# Patient Record
Sex: MALE | Race: WHITE | NOT HISPANIC OR LATINO | Employment: OTHER | URBAN - METROPOLITAN AREA
[De-identification: names, ages, dates, MRNs, and addresses within clinical notes are randomized per-mention and may not be internally consistent; named-entity substitution may affect disease eponyms.]

---

## 2021-04-19 ENCOUNTER — OFFICE VISIT (OUTPATIENT)
Dept: INTERNAL MEDICINE | Facility: CLINIC | Age: 71
End: 2021-04-19

## 2021-04-19 ENCOUNTER — LAB (OUTPATIENT)
Dept: LAB | Facility: HOSPITAL | Age: 71
End: 2021-04-19

## 2021-04-19 VITALS
HEIGHT: 72 IN | SYSTOLIC BLOOD PRESSURE: 126 MMHG | DIASTOLIC BLOOD PRESSURE: 76 MMHG | BODY MASS INDEX: 24.84 KG/M2 | HEART RATE: 56 BPM | OXYGEN SATURATION: 97 % | WEIGHT: 183.4 LBS

## 2021-04-19 DIAGNOSIS — M54.2 NECK PAIN: ICD-10-CM

## 2021-04-19 DIAGNOSIS — R00.1 BRADYCARDIA: ICD-10-CM

## 2021-04-19 DIAGNOSIS — Z20.828 MONO EXPOSURE: ICD-10-CM

## 2021-04-19 DIAGNOSIS — R91.1 LUNG NODULE: ICD-10-CM

## 2021-04-19 DIAGNOSIS — J20.9 ACUTE BRONCHITIS, UNSPECIFIED ORGANISM: ICD-10-CM

## 2021-04-19 DIAGNOSIS — J20.9 ACUTE BRONCHITIS, UNSPECIFIED ORGANISM: Primary | ICD-10-CM

## 2021-04-19 DIAGNOSIS — J43.9 PULMONARY EMPHYSEMA, UNSPECIFIED EMPHYSEMA TYPE (HCC): ICD-10-CM

## 2021-04-19 LAB
ALBUMIN SERPL-MCNC: 4.1 G/DL (ref 3.5–5.2)
ALBUMIN/GLOB SERPL: 1.2 G/DL
ALP SERPL-CCNC: 88 U/L (ref 39–117)
ALT SERPL W P-5'-P-CCNC: 11 U/L (ref 1–41)
ANION GAP SERPL CALCULATED.3IONS-SCNC: 8.8 MMOL/L (ref 5–15)
AST SERPL-CCNC: 15 U/L (ref 1–40)
BASOPHILS # BLD AUTO: 0.04 10*3/MM3 (ref 0–0.2)
BASOPHILS NFR BLD AUTO: 0.6 % (ref 0–1.5)
BILIRUB SERPL-MCNC: 0.2 MG/DL (ref 0–1.2)
BUN SERPL-MCNC: 10 MG/DL (ref 8–23)
BUN/CREAT SERPL: 9.8 (ref 7–25)
CALCIUM SPEC-SCNC: 9.4 MG/DL (ref 8.6–10.5)
CHLORIDE SERPL-SCNC: 101 MMOL/L (ref 98–107)
CO2 SERPL-SCNC: 28.2 MMOL/L (ref 22–29)
CREAT SERPL-MCNC: 1.02 MG/DL (ref 0.76–1.27)
DEPRECATED RDW RBC AUTO: 43.9 FL (ref 37–54)
EOSINOPHIL # BLD AUTO: 0.27 10*3/MM3 (ref 0–0.4)
EOSINOPHIL NFR BLD AUTO: 4.1 % (ref 0.3–6.2)
ERYTHROCYTE [DISTWIDTH] IN BLOOD BY AUTOMATED COUNT: 12.5 % (ref 12.3–15.4)
GFR SERPL CREATININE-BSD FRML MDRD: 72 ML/MIN/1.73
GLOBULIN UR ELPH-MCNC: 3.5 GM/DL
GLUCOSE SERPL-MCNC: 79 MG/DL (ref 65–99)
HCT VFR BLD AUTO: 43.3 % (ref 37.5–51)
HETEROPH AB SER QL LA: NEGATIVE
HGB BLD-MCNC: 14.6 G/DL (ref 13–17.7)
IMM GRANULOCYTES # BLD AUTO: 0.02 10*3/MM3 (ref 0–0.05)
IMM GRANULOCYTES NFR BLD AUTO: 0.3 % (ref 0–0.5)
LYMPHOCYTES # BLD AUTO: 1.71 10*3/MM3 (ref 0.7–3.1)
LYMPHOCYTES NFR BLD AUTO: 26.2 % (ref 19.6–45.3)
MCH RBC QN AUTO: 32.3 PG (ref 26.6–33)
MCHC RBC AUTO-ENTMCNC: 33.7 G/DL (ref 31.5–35.7)
MCV RBC AUTO: 95.8 FL (ref 79–97)
MONOCYTES # BLD AUTO: 0.86 10*3/MM3 (ref 0.1–0.9)
MONOCYTES NFR BLD AUTO: 13.2 % (ref 5–12)
NEUTROPHILS NFR BLD AUTO: 3.62 10*3/MM3 (ref 1.7–7)
NEUTROPHILS NFR BLD AUTO: 55.6 % (ref 42.7–76)
NRBC BLD AUTO-RTO: 0 /100 WBC (ref 0–0.2)
PLATELET # BLD AUTO: 319 10*3/MM3 (ref 140–450)
PMV BLD AUTO: 9.7 FL (ref 6–12)
POTASSIUM SERPL-SCNC: 4.6 MMOL/L (ref 3.5–5.2)
PROT SERPL-MCNC: 7.6 G/DL (ref 6–8.5)
RBC # BLD AUTO: 4.52 10*6/MM3 (ref 4.14–5.8)
SODIUM SERPL-SCNC: 138 MMOL/L (ref 136–145)
WBC # BLD AUTO: 6.52 10*3/MM3 (ref 3.4–10.8)

## 2021-04-19 PROCEDURE — 85025 COMPLETE CBC W/AUTO DIFF WBC: CPT

## 2021-04-19 PROCEDURE — 86664 EPSTEIN-BARR NUCLEAR ANTIGEN: CPT

## 2021-04-19 PROCEDURE — 80053 COMPREHEN METABOLIC PANEL: CPT

## 2021-04-19 PROCEDURE — 99204 OFFICE O/P NEW MOD 45 MIN: CPT | Performed by: PHYSICIAN ASSISTANT

## 2021-04-19 PROCEDURE — 86308 HETEROPHILE ANTIBODY SCREEN: CPT

## 2021-04-19 PROCEDURE — 36415 COLL VENOUS BLD VENIPUNCTURE: CPT

## 2021-04-19 PROCEDURE — 86665 EPSTEIN-BARR CAPSID VCA: CPT

## 2021-04-19 RX ORDER — ALBUTEROL SULFATE 90 UG/1
2 AEROSOL, METERED RESPIRATORY (INHALATION) EVERY 4 HOURS PRN
Qty: 18 G | Refills: 2 | Status: SHIPPED | OUTPATIENT
Start: 2021-04-19

## 2021-04-19 RX ORDER — LISINOPRIL 2.5 MG/1
2.5 TABLET ORAL AS NEEDED
COMMUNITY
End: 2021-11-18 | Stop reason: SDUPTHER

## 2021-04-19 RX ORDER — DOXYCYCLINE 100 MG/1
100 CAPSULE ORAL 2 TIMES DAILY
Qty: 20 CAPSULE | Refills: 0 | Status: SHIPPED | OUTPATIENT
Start: 2021-04-19 | End: 2021-06-30

## 2021-04-19 RX ORDER — ASPIRIN 81 MG/1
81 TABLET ORAL DAILY
COMMUNITY
End: 2022-10-21

## 2021-04-19 RX ORDER — METHYLPREDNISOLONE 4 MG/1
TABLET ORAL
Qty: 21 TABLET | Refills: 0 | Status: SHIPPED | OUTPATIENT
Start: 2021-04-19 | End: 2021-06-30

## 2021-04-19 RX ORDER — PRAVASTATIN SODIUM 80 MG/1
80 TABLET ORAL NIGHTLY
COMMUNITY
End: 2021-11-18 | Stop reason: SDUPTHER

## 2021-04-19 NOTE — PROGRESS NOTES
Chief Complaint   Patient presents with   • Establish Care       Subjective     David Barfield is a 71 y.o. male.        History of Present Illness     Pt is here with a friend, Balaji Trinidad, who provides much of patient's history.    [facility], usually sees her about once a year.    Pt has hypertension and takes lisinopril occasionally.     He has emphysema and has had PET scan before that showed a positive spot. He has not agreed to having the spot biopsied, lymph node was benign. Biopsy and PET scan were done in [place].     Pt was with grandson a couple weeks ago and found out later that his grandson has Mono. Pt has been feeling fatigued and has a cough. Had subjective fever last night. Took some Tylenol last night. Feels like he has these same symptoms every year. Has tried allergy treatment.     Has been told he needs a pacemaker but he has not followed up with Cardiologist.           Current Outpatient Medications:   •  aspirin 81 MG EC tablet, Take 81 mg by mouth Daily., Disp: , Rfl:   •  lisinopril (PRINIVIL,ZESTRIL) 2.5 MG tablet, Take 2.5 mg by mouth Daily. Take 1/2 tablet po prn, Disp: , Rfl:   •  pravastatin (PRAVACHOL) 80 MG tablet, Take 80 mg by mouth Every Night., Disp: , Rfl:   •  albuterol sulfate  (90 Base) MCG/ACT inhaler, Inhale 2 puffs Every 4 (Four) Hours As Needed for Wheezing or Shortness of Air., Disp: 18 g, Rfl: 2  •  doxycycline (MONODOX) 100 MG capsule, Take 1 capsule by mouth 2 (Two) Times a Day., Disp: 20 capsule, Rfl: 0  •  methylPREDNISolone (Medrol) 4 MG dose pack, follow package directions, Disp: 21 tablet, Rfl: 0     PMFSH  The following portions of the patient's history were reviewed and updated as appropriate: allergies, current medications, past family history, past medical history, past social history, past surgical history and problem list.    Review of Systems   Constitutional: Positive for fatigue. Negative for activity change and  "unexpected weight change.   HENT: Positive for congestion, postnasal drip and sore throat. Negative for ear pain.    Eyes: Negative for pain and discharge.   Respiratory: Positive for cough. Negative for chest tightness, shortness of breath and wheezing.    Cardiovascular: Negative for chest pain and palpitations.   Gastrointestinal: Negative for abdominal pain, diarrhea and vomiting.   Endocrine: Negative.    Genitourinary: Negative.    Musculoskeletal: Negative for joint swelling.   Skin: Negative for color change, rash and wound.   Allergic/Immunologic: Negative.    Neurological: Negative for seizures and syncope.   Psychiatric/Behavioral: Negative.        Objective   /76   Pulse 56   Ht 182.9 cm (72\")   Wt 83.2 kg (183 lb 6.4 oz)   SpO2 97%   BMI 24.87 kg/m²     Physical Exam  Vitals and nursing note reviewed.   Constitutional:       General: He is not in acute distress.     Appearance: He is well-developed. He is not toxic-appearing or diaphoretic.   HENT:      Head: Normocephalic and atraumatic. Hair is normal.      Right Ear: External ear normal. No drainage, swelling or tenderness. Tympanic membrane is retracted.      Left Ear: External ear normal. No drainage, swelling or tenderness. Tympanic membrane is retracted.      Nose: Mucosal edema present.      Mouth/Throat:      Mouth: No oral lesions.      Pharynx: Uvula midline. Posterior oropharyngeal erythema present. No oropharyngeal exudate or uvula swelling.   Eyes:      General: No scleral icterus.        Right eye: No discharge.         Left eye: No discharge.      Conjunctiva/sclera: Conjunctivae normal.      Pupils: Pupils are equal, round, and reactive to light.   Cardiovascular:      Rate and Rhythm: Normal rate and regular rhythm.      Heart sounds: Normal heart sounds. No murmur heard.   No gallop.    Pulmonary:      Effort: No respiratory distress.      Breath sounds: Normal breath sounds. No stridor. No wheezing or rales.   Chest:      " Chest wall: No tenderness.   Abdominal:      Palpations: Abdomen is soft.      Tenderness: There is no abdominal tenderness.   Musculoskeletal:      Cervical back: Normal range of motion and neck supple.   Lymphadenopathy:      Cervical: Cervical adenopathy present.   Skin:     General: Skin is warm and dry.      Findings: No rash.   Neurological:      Mental Status: He is alert and oriented to person, place, and time.      Motor: No abnormal muscle tone.   Psychiatric:         Behavior: Behavior normal.         Thought Content: Thought content normal.         Judgment: Judgment normal.         No results found for this or any previous visit.     ASSESSMENT/PLAN    Diagnoses and all orders for this visit:    1. Acute bronchitis, unspecified organism (Primary)  Comments:  Check labs and chest xray as ordered. Treat with doxycycline and medrol dose pack. Further recs based on results.  Orders:  -     Mononucleosis Screen; Future  -     Cynthia-Barr Virus VCA Antibody Panel; Future  -     CBC & Differential; Future  -     Comprehensive Metabolic Panel; Future  -     XR Chest PA & Lateral; Future  -     doxycycline (MONODOX) 100 MG capsule; Take 1 capsule by mouth 2 (Two) Times a Day.  Dispense: 20 capsule; Refill: 0  -     methylPREDNISolone (Medrol) 4 MG dose pack; follow package directions  Dispense: 21 tablet; Refill: 0  -     albuterol sulfate  (90 Base) MCG/ACT inhaler; Inhale 2 puffs Every 4 (Four) Hours As Needed for Wheezing or Shortness of Air.  Dispense: 18 g; Refill: 2    2. Neck pain  -     methylPREDNISolone (Medrol) 4 MG dose pack; follow package directions  Dispense: 21 tablet; Refill: 0    3. Mono exposure  Comments:  Check monospot and Cynthia Barr virus panel.  Orders:  -     Mononucleosis Screen; Future  -     Cynthia-Barr Virus VCA Antibody Panel; Future  -     EBV Antibody Profile; Future    4. Bradycardia  Comments:  Refer to Cardiology to establish care.  Orders:  -     Ambulatory Referral  to Cardiology    5. Lung nodule  Comments:  Recheck CT scan and refer to Pulmonary and to establish care for monitoring.  Orders:  -     Cancel: Ambulatory Referral to Pulmonology  -     Ambulatory Referral to Pulmonology  -     CT Chest With & Without Contrast; Future    6. Pulmonary emphysema, unspecified emphysema type (CMS/HCC)  -     Ambulatory Referral to Pulmonology  -     CT Chest With & Without Contrast; Future             Return for Medicare Wellness.

## 2021-04-20 ENCOUNTER — HOSPITAL ENCOUNTER (OUTPATIENT)
Dept: GENERAL RADIOLOGY | Facility: HOSPITAL | Age: 71
Discharge: HOME | End: 2021-04-20
Admitting: PHYSICIAN ASSISTANT

## 2021-04-20 ENCOUNTER — TELEPHONE (OUTPATIENT)
Dept: INTERNAL MEDICINE | Facility: CLINIC | Age: 71
End: 2021-04-20

## 2021-04-20 DIAGNOSIS — J20.9 ACUTE BRONCHITIS, UNSPECIFIED ORGANISM: ICD-10-CM

## 2021-04-20 PROCEDURE — 71046 X-RAY EXAM CHEST 2 VIEWS: CPT

## 2021-04-20 NOTE — TELEPHONE ENCOUNTER
Caller: ISAÍAS ANDERSON    Relationship: Emergency Contact    Best call back number: [phone]    Caller requesting test results: ANGELETTA FIELDS (FRIEND - NO [facility] VERBAL FILE)    What test was performed: LABS    When was the test performed: 4/19/21    Where was the test performed: OUR OFFICE    Additional notes: XRAY WAS DONE [facility] TODAY 4/20

## 2021-04-21 ENCOUNTER — TELEPHONE (OUTPATIENT)
Dept: INTERNAL MEDICINE | Facility: CLINIC | Age: 71
End: 2021-04-21

## 2021-04-21 LAB
EBV NA IGG SER IA-ACNC: >600 U/ML (ref 0–17.9)
EBV VCA IGG SER IA-ACNC: >600 U/ML (ref 0–17.9)
EBV VCA IGM SER IA-ACNC: <36 U/ML (ref 0–35.9)
SERVICE CMNT-IMP: ABNORMAL

## 2021-04-22 NOTE — TELEPHONE ENCOUNTER
Labs show evidence of old mono infection but not a new infection. Otherwise his labs are normal.    His chest xray shows chronic changes but is otherwise normal.    Also, the Pulmonology clinic requested that he have a follow up chest CT before they can see him. If he is okay with it, I will order it.

## 2021-05-27 ENCOUNTER — HOSPITAL ENCOUNTER (OUTPATIENT)
Dept: CT IMAGING | Facility: HOSPITAL | Age: 71
Discharge: HOME | End: 2021-05-27
Admitting: PHYSICIAN ASSISTANT

## 2021-05-27 DIAGNOSIS — J43.9 PULMONARY EMPHYSEMA, UNSPECIFIED EMPHYSEMA TYPE (HCC): ICD-10-CM

## 2021-05-27 DIAGNOSIS — R91.1 LUNG NODULE: ICD-10-CM

## 2021-05-27 LAB — CREAT BLDA-MCNC: 1.3 MG/DL (ref 0.6–1.3)

## 2021-05-27 PROCEDURE — 82565 ASSAY OF CREATININE: CPT

## 2021-05-27 PROCEDURE — 25010000002 IOPAMIDOL 61 % SOLUTION: Performed by: PHYSICIAN ASSISTANT

## 2021-05-27 PROCEDURE — 71270 CT THORAX DX C-/C+: CPT

## 2021-05-27 RX ADMIN — IOPAMIDOL 95 ML: 612 INJECTION, SOLUTION INTRAVENOUS at 15:11

## 2021-05-28 ENCOUNTER — TELEPHONE (OUTPATIENT)
Dept: INTERNAL MEDICINE | Facility: CLINIC | Age: 71
End: 2021-05-28

## 2021-05-28 NOTE — TELEPHONE ENCOUNTER
Caller: David Barfield    Relationship: Self    Best call back number: [phone]    Caller requesting test results: PATIENT     What test was performed: CT SCAN OF LUNGS      When was the test performed: 5-27-21    Where was the test performed: [address]    Additional notes: PATIENT IS REQUESTING A CALL ABOUT THE RESULTS AND ALSO TO PUT THEM IN MY CHART

## 2021-05-28 NOTE — TELEPHONE ENCOUNTER
Pt advised of prelim results, and advised was once the report is final he will be able to view with [employer]

## 2021-05-28 NOTE — TELEPHONE ENCOUNTER
The preliminary report shows chronic emphysematous changes with several areas of chronic scarring.     When the final report is signed, it should go immediately into MyChart. I cannot release it any sooner.

## 2021-05-29 NOTE — PROGRESS NOTES
The final report of the chest CT is unchanged from the preliminary one. Please go ahead with scheduling your Pulmonology appointment to follow up on your emphysema.

## 2021-06-30 ENCOUNTER — CONSULT (OUTPATIENT)
Dept: CARDIOLOGY | Facility: CLINIC | Age: 71
End: 2021-06-30

## 2021-06-30 VITALS
HEART RATE: 48 BPM | OXYGEN SATURATION: 95 % | SYSTOLIC BLOOD PRESSURE: 158 MMHG | WEIGHT: 184.2 LBS | HEIGHT: 72 IN | BODY MASS INDEX: 24.95 KG/M2 | DIASTOLIC BLOOD PRESSURE: 80 MMHG

## 2021-06-30 DIAGNOSIS — R94.31 ABNORMAL ELECTROCARDIOGRAM (ECG) (EKG): ICD-10-CM

## 2021-06-30 DIAGNOSIS — R00.1 BRADYCARDIA, SINUS: Primary | ICD-10-CM

## 2021-06-30 DIAGNOSIS — I44.39 HIGH DEGREE ATRIOVENTRICULAR BLOCK: ICD-10-CM

## 2021-06-30 DIAGNOSIS — I95.89 CHRONIC HYPOTENSION: ICD-10-CM

## 2021-06-30 DIAGNOSIS — I49.3 PVC'S (PREMATURE VENTRICULAR CONTRACTIONS): ICD-10-CM

## 2021-06-30 PROCEDURE — 99204 OFFICE O/P NEW MOD 45 MIN: CPT | Performed by: INTERNAL MEDICINE

## 2021-06-30 PROCEDURE — 93000 ELECTROCARDIOGRAM COMPLETE: CPT | Performed by: NURSE PRACTITIONER

## 2021-06-30 NOTE — PROGRESS NOTES
[facility] Cardiology    Subjective:     Encounter Date:06/30/2021         Patient ID: David Barfield is a 71 y.o. male.  Referring provider: THIEN Wright     Reason for consultation:   Chief Complaint   Patient presents with   • Slow Heart Rate   • Fatigue        PROBLEM LIST:  1. Bradycardia  a. Exercise MPS 8/6/20, OSH: No perfusion defect, EF 53%  b. Occasional asymptomatic junctional rhythm per previous cardiologist note  c. Zio patch 8/21/20, 13 days, OSH: min 31, max 152, avg 56. No pauses greater than 2 seconds. Second degree type 1 AV. < 1% PAC burden. 8.2% PVC burden.   2. Premature ventricular contractions  3. Zio patch 8/21/20: 8.2% PVC burden  4. Thoracic aneurysm  a. CT chest 1/4/19, OSH: aortic root dilation, 4.2 cm.   b. CT chest 7/15/19, OSH: aortic root dilation, 4.2 cm.   c. CT chest 1/22/20, OSH: dilated aortic root 4.2 cm.   d. CT chest 5/6/2020, OSH: dilated aortic root, 4.2 cm  5. Lung nodules  6. Hyperlipidemia  a. FLP 12/22/20: , Trig 94, HDL 37, .     HPI:    David Barfield is a 71 y.o. male who is seen in consultation today at the request of THIEN Wright for bradycardia and PVCs. Patient has a long standing history of bradycardia. He wore a 13 day monitor last August which revealed 8.2% PVCs, no significant pauses, min HR of 31 bpm and second degree AV block, type I. Since then he has done well overall. 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 In the AM, his blood pressures have been 140's/80's. In the afternoon, his blood pressure drops to <80 systolic and he becomes weak, dizzy and tired. His HR is low at these times as well. He does not adequately hydrate. He drinks coffee throughout the day and very little water. He denies chest pain, shortness of breath, PND, orthopnea, JOSE, near syncope or syncope. He has not been taking lisinopril for at least 2 months.     Medications and Allergies:    No Known  Allergies      Current Outpatient Medications:   •  albuterol sulfate  (90 Base) MCG/ACT inhaler, Inhale 2 puffs Every 4 (Four) Hours As Needed for Wheezing or Shortness of Air., Disp: 18 g, Rfl: 2  •  aspirin 81 MG EC tablet, Take 81 mg by mouth Daily., Disp: , Rfl:   •  lisinopril (PRINIVIL,ZESTRIL) 2.5 MG tablet, Take 2.5 mg by mouth As Needed. Take 1/2 tablet po prn , Disp: , Rfl:   •  pravastatin (PRAVACHOL) 80 MG tablet, Take 80 mg by mouth Every Night., Disp: , Rfl:     Social History:  Social History     Tobacco Use   • Smoking status: Current Some Day Smoker     Packs/day: 1.00   • Smokeless tobacco: Never Used   Substance Use Topics   • Alcohol use: Never        Family History:  family history includes Aneurysm in his mother; Dementia in his father; Leukemia in his sister.     Review of Systems: Pertinent positives in HPI    The following portions of the patient's history were reviewed and updated as appropriate: allergies, current medications and problem list.       Objective:     Vitals:    06/30/21 1255   BP: 158/80   Pulse: (!) 48   SpO2: 95%       GENERAL: This is a well-developed, well-nourished, male who is in no acute distress.   SKIN: Pink and warm without rash or abnormality noted.   HEENT: Head is normocephalic and atraumatic. Pupils are equal and reactive to light bilaterally. Mucous membranes are pink and moist.   NECK: Supple without lymphadenopathy or thyromegaly. There is no jugular venous distention at 30°.  LUNGS: Clear to auscultation bilaterally without wheezing, rhonchi, or rales noted.   CARDIOVASCULAR: The heart has a regular rhythm, nolberto rate with a normal S1 and S2. There is no murmur, gallop, rub, or click appreciated. The PMI is nondisplaced. Carotid upstrokes are 2+ and symmetrical without bruits.   ABDOMEN: Soft and nondistended with positive bowel sounds x4. The patient denies tenderness of palpitation.   MUSCULOSKELETAL: There are no obvious bony abnormalities. Normal  range of tenderness to palpation.   NEUROLOGICAL: Nonfocal. Alert and oriented x3.   PERIPHERAL VASCULAR: Femoral pulses are 2+ and symmetrical without bruits. Posterior tibial and dorsalis pedis pulses are 2+ and symmetrical. There is no peripheral edema.   PSYCH: Normal mood and affect.       ECG 12 Lead    Date/Time: 6/30/2021 1:30 PM  Performed by: Caitie Marc APRN  Authorized by: Caitie Marc APRN   Comparison: not compared with previous ECG   Previous ECG: no previous ECG available  BPM: 59    Clinical impression: abnormal EKG  Comments: High degree AV block with premature ventricular contractions.              ASSESSMENT       ICD-10-CM ICD-9-CM   1. Bradycardia, sinus  R00.1 427.89   2. High degree atrioventricular block  I44.39 426.10   3. Chronic hypotension  I95.89 458.1   4. PVC's (premature ventricular contractions)  I49.3 427.69   5. Abnormal electrocardiogram (ECG) (EKG)   R94.31 794.31          PLAN     1. Echocardiogram.   2. 2 week live monitor for bradycardia, high degree AVB.   3. Discussed need to avoid caffeine and increase water intake. Remain off Lisinopril. Reyno salt intake for hypotension.   4. Follow-up in Return in about 6 weeks (around 8/11/2021)., sooner as needed.      Scribed for Kayy Box MD by ANAMIKA Guido. 6/30/2021  13:42 EDT     I Kayy Box MD personally performed the services described in this documentation as scribed by the above individual in my presence, and it is both accurate and complete.    Kayy Box MD, [facility]

## 2021-07-09 ENCOUNTER — HOSPITAL ENCOUNTER (OUTPATIENT)
Dept: CARDIOLOGY | Facility: HOSPITAL | Age: 71
Discharge: HOME | End: 2021-07-09
Admitting: NURSE PRACTITIONER

## 2021-07-09 VITALS — BODY MASS INDEX: 24.92 KG/M2 | WEIGHT: 184 LBS | HEIGHT: 72 IN

## 2021-07-09 DIAGNOSIS — R94.31 ABNORMAL ELECTROCARDIOGRAM (ECG) (EKG): ICD-10-CM

## 2021-07-09 DIAGNOSIS — R00.1 BRADYCARDIA, SINUS: ICD-10-CM

## 2021-07-09 LAB
BH CV ECHO MEAS - AO ROOT AREA (BSA CORRECTED): 1.9
BH CV ECHO MEAS - AO ROOT AREA: 12.2 CM^2
BH CV ECHO MEAS - AO ROOT DIAM: 3.9 CM
BH CV ECHO MEAS - ASC AORTA: 3.5 CM
BH CV ECHO MEAS - BSA(HAYCOCK): 2.1 M^2
BH CV ECHO MEAS - BSA: 2.1 M^2
BH CV ECHO MEAS - BZI_BMI: 25 KILOGRAMS/M^2
BH CV ECHO MEAS - BZI_METRIC_HEIGHT: 182.9 CM
BH CV ECHO MEAS - BZI_METRIC_WEIGHT: 83.5 KG
BH CV ECHO MEAS - EDV(CUBED): 43.9 ML
BH CV ECHO MEAS - EDV(MOD-SP2): 146 ML
BH CV ECHO MEAS - EDV(MOD-SP4): 129 ML
BH CV ECHO MEAS - EDV(TEICH): 51.8 ML
BH CV ECHO MEAS - EF(CUBED): 72.5 %
BH CV ECHO MEAS - EF(MOD-BP): 56 %
BH CV ECHO MEAS - EF(MOD-SP2): 55.5 %
BH CV ECHO MEAS - EF(MOD-SP4): 57.4 %
BH CV ECHO MEAS - EF(TEICH): 65.3 %
BH CV ECHO MEAS - ESV(CUBED): 12.1 ML
BH CV ECHO MEAS - ESV(MOD-SP2): 65 ML
BH CV ECHO MEAS - ESV(MOD-SP4): 55 ML
BH CV ECHO MEAS - ESV(TEICH): 18 ML
BH CV ECHO MEAS - FS: 35 %
BH CV ECHO MEAS - IVS/LVPW: 0.78
BH CV ECHO MEAS - IVSD: 1.1 CM
BH CV ECHO MEAS - LA DIMENSION: 3 CM
BH CV ECHO MEAS - LA/AO: 0.77
BH CV ECHO MEAS - LAD MAJOR: 4.8 CM
BH CV ECHO MEAS - LAT PEAK E' VEL: 8 CM/SEC
BH CV ECHO MEAS - LATERAL E/E' RATIO: 9.6
BH CV ECHO MEAS - LV DIASTOLIC VOL/BSA (35-75): 62.7 ML/M^2
BH CV ECHO MEAS - LV MASS(C)D: 140.8 GRAMS
BH CV ECHO MEAS - LV MASS(C)DI: 68.5 GRAMS/M^2
BH CV ECHO MEAS - LV MAX PG: 2.2 MMHG
BH CV ECHO MEAS - LV MEAN PG: 0.83 MMHG
BH CV ECHO MEAS - LV SYSTOLIC VOL/BSA (12-30): 26.7 ML/M^2
BH CV ECHO MEAS - LV V1 MAX: 74 CM/SEC
BH CV ECHO MEAS - LV V1 MEAN: 39.2 CM/SEC
BH CV ECHO MEAS - LV V1 VTI: 17.6 CM
BH CV ECHO MEAS - LVIDD: 3.5 CM
BH CV ECHO MEAS - LVIDS: 2.3 CM
BH CV ECHO MEAS - LVLD AP2: 9.1 CM
BH CV ECHO MEAS - LVLD AP4: 9 CM
BH CV ECHO MEAS - LVLS AP2: 8 CM
BH CV ECHO MEAS - LVLS AP4: 7.6 CM
BH CV ECHO MEAS - LVOT AREA (M): 3.5 CM^2
BH CV ECHO MEAS - LVOT AREA: 3.4 CM^2
BH CV ECHO MEAS - LVOT DIAM: 2.1 CM
BH CV ECHO MEAS - LVPWD: 1.1 CM
BH CV ECHO MEAS - MED PEAK E' VEL: 7.9 CM/SEC
BH CV ECHO MEAS - MEDIAL E/E' RATIO: 9.7
BH CV ECHO MEAS - MV DEC SLOPE: 176.6 CM/SEC^2
BH CV ECHO MEAS - MV DEC TIME: 0.18 SEC
BH CV ECHO MEAS - MV E MAX VEL: 78.5 CM/SEC
BH CV ECHO MEAS - MV MAX PG: 2.6 MMHG
BH CV ECHO MEAS - MV MEAN PG: 0.75 MMHG
BH CV ECHO MEAS - MV P1/2T MAX VEL: 81.4 CM/SEC
BH CV ECHO MEAS - MV P1/2T: 135 MSEC
BH CV ECHO MEAS - MV V2 MAX: 80.1 CM/SEC
BH CV ECHO MEAS - MV V2 MEAN: 38.9 CM/SEC
BH CV ECHO MEAS - MV V2 VTI: 33.2 CM
BH CV ECHO MEAS - MVA P1/2T LCG: 2.7 CM^2
BH CV ECHO MEAS - MVA(P1/2T): 1.6 CM^2
BH CV ECHO MEAS - MVA(VTI): 1.8 CM^2
BH CV ECHO MEAS - PA ACC SLOPE: 431.5 CM/SEC^2
BH CV ECHO MEAS - PA ACC TIME: 0.16 SEC
BH CV ECHO MEAS - PA PR(ACCEL): 5.4 MMHG
BH CV ECHO MEAS - SI(CUBED): 15.5 ML/M^2
BH CV ECHO MEAS - SI(LVOT): 29.2 ML/M^2
BH CV ECHO MEAS - SI(MOD-SP2): 39.4 ML/M^2
BH CV ECHO MEAS - SI(MOD-SP4): 36 ML/M^2
BH CV ECHO MEAS - SI(TEICH): 16.4 ML/M^2
BH CV ECHO MEAS - SV(CUBED): 31.8 ML
BH CV ECHO MEAS - SV(LVOT): 60 ML
BH CV ECHO MEAS - SV(MOD-SP2): 81 ML
BH CV ECHO MEAS - SV(MOD-SP4): 74 ML
BH CV ECHO MEAS - SV(TEICH): 33.8 ML
BH CV ECHO MEAS - TAPSE (>1.6): 1.4 CM
BH CV ECHO MEASUREMENTS AVERAGE E/E' RATIO: 9.87
BH CV VAS BP LEFT ARM: NORMAL MMHG
BH CV XLRA - RV BASE: 4.2 CM
BH CV XLRA - RV LENGTH: 5.3 CM
BH CV XLRA - RV MID: 2.7 CM
BH CV XLRA - TDI S': 11.5 CM/SEC
LEFT ATRIUM VOLUME INDEX: 27.2 ML/M^2
LEFT ATRIUM VOLUME: 56 ML
LV EF 2D ECHO EST: 55 %
MAXIMAL PREDICTED HEART RATE: 148 BPM
STRESS TARGET HR: 126 BPM

## 2021-07-09 PROCEDURE — 93306 TTE W/DOPPLER COMPLETE: CPT

## 2021-07-09 PROCEDURE — 93306 TTE W/DOPPLER COMPLETE: CPT | Performed by: INTERNAL MEDICINE

## 2021-07-28 ENCOUNTER — TELEPHONE (OUTPATIENT)
Dept: CARDIOLOGY | Facility: CLINIC | Age: 71
End: 2021-07-28

## 2021-07-28 ENCOUNTER — APPOINTMENT (OUTPATIENT)
Dept: CARDIOLOGY | Facility: HOSPITAL | Age: 71
End: 2021-07-28

## 2021-07-28 DIAGNOSIS — I44.39 HIGH DEGREE ATRIOVENTRICULAR BLOCK: Primary | ICD-10-CM

## 2021-07-28 DIAGNOSIS — I95.89 CHRONIC HYPOTENSION: ICD-10-CM

## 2021-07-28 DIAGNOSIS — R00.1 BRADYCARDIA, SINUS: ICD-10-CM

## 2021-07-28 DIAGNOSIS — I49.3 PVC'S (PREMATURE VENTRICULAR CONTRACTIONS): ICD-10-CM

## 2021-08-13 ENCOUNTER — PRE-ADMISSION TESTING (OUTPATIENT)
Dept: PREADMISSION TESTING | Facility: HOSPITAL | Age: 71
End: 2021-08-13

## 2021-08-13 ENCOUNTER — PREP FOR SURGERY (OUTPATIENT)
Dept: OTHER | Facility: HOSPITAL | Age: 71
End: 2021-08-13

## 2021-08-13 VITALS — HEIGHT: 72 IN | BODY MASS INDEX: 25.03 KG/M2 | WEIGHT: 184.8 LBS

## 2021-08-13 DIAGNOSIS — I49.5 SSS (SICK SINUS SYNDROME) (HCC): ICD-10-CM

## 2021-08-13 DIAGNOSIS — I49.5 SSS (SICK SINUS SYNDROME) (HCC): Primary | ICD-10-CM

## 2021-08-13 LAB
ANION GAP SERPL CALCULATED.3IONS-SCNC: 11 MMOL/L (ref 5–15)
BUN SERPL-MCNC: 10 MG/DL (ref 8–23)
BUN/CREAT SERPL: 10.1 (ref 7–25)
CALCIUM SPEC-SCNC: 9.5 MG/DL (ref 8.6–10.5)
CHLORIDE SERPL-SCNC: 98 MMOL/L (ref 98–107)
CO2 SERPL-SCNC: 25 MMOL/L (ref 22–29)
CREAT SERPL-MCNC: 0.99 MG/DL (ref 0.76–1.27)
DEPRECATED RDW RBC AUTO: 48.6 FL (ref 37–54)
ERYTHROCYTE [DISTWIDTH] IN BLOOD BY AUTOMATED COUNT: 13.5 % (ref 12.3–15.4)
GFR SERPL CREATININE-BSD FRML MDRD: 74 ML/MIN/1.73
GLUCOSE SERPL-MCNC: 90 MG/DL (ref 65–99)
HCT VFR BLD AUTO: 41.8 % (ref 37.5–51)
HGB BLD-MCNC: 13.9 G/DL (ref 13–17.7)
MCH RBC QN AUTO: 32.1 PG (ref 26.6–33)
MCHC RBC AUTO-ENTMCNC: 33.3 G/DL (ref 31.5–35.7)
MCV RBC AUTO: 96.5 FL (ref 79–97)
PLATELET # BLD AUTO: 260 10*3/MM3 (ref 140–450)
PMV BLD AUTO: 9.4 FL (ref 6–12)
POTASSIUM SERPL-SCNC: 4.7 MMOL/L (ref 3.5–5.2)
RBC # BLD AUTO: 4.33 10*6/MM3 (ref 4.14–5.8)
SODIUM SERPL-SCNC: 134 MMOL/L (ref 136–145)
WBC # BLD AUTO: 8.96 10*3/MM3 (ref 3.4–10.8)

## 2021-08-13 PROCEDURE — 36415 COLL VENOUS BLD VENIPUNCTURE: CPT

## 2021-08-13 PROCEDURE — 80048 BASIC METABOLIC PNL TOTAL CA: CPT

## 2021-08-13 PROCEDURE — 85027 COMPLETE CBC AUTOMATED: CPT

## 2021-08-13 RX ORDER — SODIUM CHLORIDE 0.9 % (FLUSH) 0.9 %
3 SYRINGE (ML) INJECTION EVERY 12 HOURS SCHEDULED
Status: CANCELLED | OUTPATIENT
Start: 2021-08-13

## 2021-08-13 RX ORDER — CEFAZOLIN SODIUM 2 G/100ML
2 INJECTION, SOLUTION INTRAVENOUS ONCE
Status: CANCELLED | OUTPATIENT
Start: 2021-08-13 | End: 2021-08-13

## 2021-08-13 RX ORDER — SODIUM CHLORIDE 0.9 % (FLUSH) 0.9 %
10 SYRINGE (ML) INJECTION AS NEEDED
Status: CANCELLED | OUTPATIENT
Start: 2021-08-13

## 2021-08-13 NOTE — DISCHARGE INSTRUCTIONS
"Dear Patient,    Drink plenty of fluids the day before to ensure you are well hydrated, unless otherwise directed by your physician.    Do NOT eat after midnight the night before your procedure.   You may have clear liquids only up to three hours before your scheduled arrival time (Water is best, but clear liquids can also include coffee without cream or milk, fruit juice without pulp, clear broth, and clear gelatin).  During the three hour pre-procedure timeframe, NOTHING BY MOUTH (NPO).    We encourage you to drink 8 ounces of water three to four hours before your scheduled arrival time.    Take your medications as instructed by your doctor.    Following your procedure, be sure to drink plenty of fluids to continue flushing the kidneys if dye was utilized during your procedure (cardiac catheterization)    Benefits of hydrating before and after your procedure include:    -improved hydration helps prevent potential harm to the kidneys    by flushing the contrast/dye used during your procedure (if    applicable)    -Lower post-procedure complications    -Improved patient comfort     Do NOT smoke after midnight the night before your procedure.    Glasses and jewelry may be worn, but dentures must be removed prior to your procedure.    Leave any items you consider valuable at home.      MORNING of your Procedure, please bring the following:     -Photo ID and insurance card(s)    -ALL medications in their ORIGINAL CONTAINERS    -Co-pay and/or deductible required by your insurance   -Copy of living will or power of  document (if not brought to    Pre-Admission Testing department)   -CPAP mask and tubing, not your machine (if applicable)    -Relaxation aids (music, books, magazines)    Family members may wait in [facility] waiting area during procedure.    Need to make arrangements for transportation prior to discharge.    A handout regarding \"Heart Healthy Eating\" was provided today to encourage healthy eating " habits.    Booklet published by [employer] was given in Pre-Admission testing.  This booklet is for informational purposes only.  If you have any questions about your procedure, please speak with your physician.

## 2021-08-13 NOTE — PAT
Patient to apply Chlorhexadine wipes  to surgical area (as instructed) the night before procedure and the AM of procedure. Wipes provided.    No order for procedure consent.  Please obtain procedure consent on the day of procedure.    Patient did not review general PAT education video as instructed in their preoperative information received from their surgeon.  One-on-one Pre Admission Testing general education provided during PAT visit.  Copies of PAT general education handouts (Incentive Spirometry, Meds to Beds Program, Patient Belongings, Pre-op skin preparation instructions, Blood Glucose testing, Visitor policy, Surgery FAQ, Code H) distributed to patient. Encouraged patient/family to read PAT general education handouts thoroughly and notify PAT staff with any questions or concerns. Patient instructed to bring PAT pass and completed skin prep sheet (if applicable) on the day of procedure. Patient verbalized understanding of all information and priority content.

## 2021-08-17 ENCOUNTER — READMISSION MANAGEMENT (OUTPATIENT)
Dept: CALL CENTER | Facility: HOSPITAL | Age: 71
End: 2021-08-17

## 2021-08-17 ENCOUNTER — APPOINTMENT (OUTPATIENT)
Dept: GENERAL RADIOLOGY | Facility: HOSPITAL | Age: 71
End: 2021-08-17

## 2021-08-17 ENCOUNTER — HOSPITAL ENCOUNTER (OUTPATIENT)
Facility: HOSPITAL | Age: 71
Discharge: HOME | End: 2021-08-17
Attending: INTERNAL MEDICINE | Admitting: INTERNAL MEDICINE

## 2021-08-17 VITALS
WEIGHT: 182.4 LBS | OXYGEN SATURATION: 97 % | HEART RATE: 61 BPM | DIASTOLIC BLOOD PRESSURE: 96 MMHG | TEMPERATURE: 97.8 F | RESPIRATION RATE: 14 BRPM | BODY MASS INDEX: 24.71 KG/M2 | SYSTOLIC BLOOD PRESSURE: 146 MMHG | HEIGHT: 72 IN

## 2021-08-17 DIAGNOSIS — I95.89 CHRONIC HYPOTENSION: ICD-10-CM

## 2021-08-17 DIAGNOSIS — I49.3 PVC'S (PREMATURE VENTRICULAR CONTRACTIONS): ICD-10-CM

## 2021-08-17 DIAGNOSIS — I49.5 SSS (SICK SINUS SYNDROME) (HCC): ICD-10-CM

## 2021-08-17 DIAGNOSIS — R00.1 BRADYCARDIA, SINUS: ICD-10-CM

## 2021-08-17 DIAGNOSIS — I44.39 HIGH DEGREE ATRIOVENTRICULAR BLOCK: ICD-10-CM

## 2021-08-17 LAB
ANION GAP SERPL CALCULATED.3IONS-SCNC: 10 MMOL/L (ref 5–15)
BUN SERPL-MCNC: 8 MG/DL (ref 8–23)
BUN/CREAT SERPL: 8.8 (ref 7–25)
CALCIUM SPEC-SCNC: 9.1 MG/DL (ref 8.6–10.5)
CHLORIDE SERPL-SCNC: 102 MMOL/L (ref 98–107)
CO2 SERPL-SCNC: 25 MMOL/L (ref 22–29)
CREAT SERPL-MCNC: 0.91 MG/DL (ref 0.76–1.27)
GFR SERPL CREATININE-BSD FRML MDRD: 82 ML/MIN/1.73
GLUCOSE SERPL-MCNC: 100 MG/DL (ref 65–99)
POTASSIUM SERPL-SCNC: 4.5 MMOL/L (ref 3.5–5.2)
SODIUM SERPL-SCNC: 137 MMOL/L (ref 136–145)

## 2021-08-17 PROCEDURE — 33208 INSRT HEART PM ATRIAL & VENT: CPT | Performed by: INTERNAL MEDICINE

## 2021-08-17 PROCEDURE — 71046 X-RAY EXAM CHEST 2 VIEWS: CPT

## 2021-08-17 PROCEDURE — 25010000003 CEFAZOLIN IN DEXTROSE 2-4 GM/100ML-% SOLUTION: Performed by: NURSE PRACTITIONER

## 2021-08-17 PROCEDURE — 25010000002 MIDAZOLAM PER 1 MG: Performed by: INTERNAL MEDICINE

## 2021-08-17 PROCEDURE — 99152 MOD SED SAME PHYS/QHP 5/>YRS: CPT | Performed by: INTERNAL MEDICINE

## 2021-08-17 PROCEDURE — G0378 HOSPITAL OBSERVATION PER HR: HCPCS

## 2021-08-17 PROCEDURE — C1898 LEAD, PMKR, OTHER THAN TRANS: HCPCS | Performed by: INTERNAL MEDICINE

## 2021-08-17 PROCEDURE — 25010000003 CEFAZOLIN IN DEXTROSE 2-4 GM/100ML-% SOLUTION: Performed by: INTERNAL MEDICINE

## 2021-08-17 PROCEDURE — C1785 PMKR, DUAL, RATE-RESP: HCPCS | Performed by: INTERNAL MEDICINE

## 2021-08-17 PROCEDURE — 80048 BASIC METABOLIC PNL TOTAL CA: CPT | Performed by: INTERNAL MEDICINE

## 2021-08-17 PROCEDURE — C1892 INTRO/SHEATH,FIXED,PEEL-AWAY: HCPCS | Performed by: INTERNAL MEDICINE

## 2021-08-17 PROCEDURE — 99153 MOD SED SAME PHYS/QHP EA: CPT | Performed by: INTERNAL MEDICINE

## 2021-08-17 PROCEDURE — 25010000002 FENTANYL CITRATE (PF) 50 MCG/ML SOLUTION: Performed by: INTERNAL MEDICINE

## 2021-08-17 DEVICE — PACEMAKER
Type: IMPLANTABLE DEVICE | Status: FUNCTIONAL
Brand: ACCOLADE™ MRI DR

## 2021-08-17 DEVICE — PACE/SENSE LEAD
Type: IMPLANTABLE DEVICE | Status: FUNCTIONAL
Brand: INGEVITY™+

## 2021-08-17 RX ORDER — MAGNESIUM HYDROXIDE 1200 MG/15ML
LIQUID ORAL AS NEEDED
Status: DISCONTINUED | OUTPATIENT
Start: 2021-08-17 | End: 2021-08-17 | Stop reason: HOSPADM

## 2021-08-17 RX ORDER — SODIUM CHLORIDE 0.9 % (FLUSH) 0.9 %
3 SYRINGE (ML) INJECTION EVERY 12 HOURS SCHEDULED
Status: DISCONTINUED | OUTPATIENT
Start: 2021-08-17 | End: 2021-08-17 | Stop reason: HOSPADM

## 2021-08-17 RX ORDER — FENTANYL CITRATE 50 UG/ML
INJECTION, SOLUTION INTRAMUSCULAR; INTRAVENOUS AS NEEDED
Status: DISCONTINUED | OUTPATIENT
Start: 2021-08-17 | End: 2021-08-17 | Stop reason: HOSPADM

## 2021-08-17 RX ORDER — SODIUM CHLORIDE 0.9 % (FLUSH) 0.9 %
10 SYRINGE (ML) INJECTION AS NEEDED
Status: DISCONTINUED | OUTPATIENT
Start: 2021-08-17 | End: 2021-08-17 | Stop reason: HOSPADM

## 2021-08-17 RX ORDER — SODIUM CHLORIDE 0.9 % (FLUSH) 0.9 %
1-10 SYRINGE (ML) INJECTION AS NEEDED
Status: DISCONTINUED | OUTPATIENT
Start: 2021-08-17 | End: 2021-08-17 | Stop reason: HOSPADM

## 2021-08-17 RX ORDER — LIDOCAINE HYDROCHLORIDE 10 MG/ML
INJECTION, SOLUTION EPIDURAL; INFILTRATION; INTRACAUDAL; PERINEURAL AS NEEDED
Status: DISCONTINUED | OUTPATIENT
Start: 2021-08-17 | End: 2021-08-17 | Stop reason: HOSPADM

## 2021-08-17 RX ORDER — MIDAZOLAM HYDROCHLORIDE 1 MG/ML
INJECTION INTRAMUSCULAR; INTRAVENOUS AS NEEDED
Status: DISCONTINUED | OUTPATIENT
Start: 2021-08-17 | End: 2021-08-17 | Stop reason: HOSPADM

## 2021-08-17 RX ORDER — CEFAZOLIN SODIUM 2 G/100ML
2 INJECTION, SOLUTION INTRAVENOUS ONCE
Status: COMPLETED | OUTPATIENT
Start: 2021-08-17 | End: 2021-08-17

## 2021-08-17 RX ORDER — IBUPROFEN 200 MG
400 TABLET ORAL EVERY 6 HOURS PRN
Status: DISCONTINUED | OUTPATIENT
Start: 2021-08-17 | End: 2021-08-17 | Stop reason: HOSPADM

## 2021-08-17 RX ORDER — CEFAZOLIN SODIUM 2 G/100ML
2 INJECTION, SOLUTION INTRAVENOUS ONCE
Status: DISCONTINUED | OUTPATIENT
Start: 2021-08-17 | End: 2021-08-17

## 2021-08-17 RX ORDER — SODIUM CHLORIDE 9 MG/ML
INJECTION, SOLUTION INTRAVENOUS CONTINUOUS PRN
Status: COMPLETED | OUTPATIENT
Start: 2021-08-17 | End: 2021-08-17

## 2021-08-17 RX ORDER — CEFAZOLIN SODIUM 2 G/100ML
2 INJECTION, SOLUTION INTRAVENOUS EVERY 6 HOURS
Status: DISCONTINUED | OUTPATIENT
Start: 2021-08-17 | End: 2021-08-17

## 2021-08-17 RX ORDER — NALOXONE HCL 0.4 MG/ML
0.4 VIAL (ML) INJECTION
Status: DISCONTINUED | OUTPATIENT
Start: 2021-08-17 | End: 2021-08-17 | Stop reason: HOSPADM

## 2021-08-17 RX ORDER — MORPHINE SULFATE 2 MG/ML
2 INJECTION, SOLUTION INTRAMUSCULAR; INTRAVENOUS EVERY 4 HOURS PRN
Status: DISCONTINUED | OUTPATIENT
Start: 2021-08-17 | End: 2021-08-17 | Stop reason: HOSPADM

## 2021-08-17 RX ADMIN — CEFAZOLIN SODIUM 2 G: 2 INJECTION, SOLUTION INTRAVENOUS at 13:58

## 2021-08-17 RX ADMIN — CEFAZOLIN SODIUM 2 G: 2 INJECTION, SOLUTION INTRAVENOUS at 08:16

## 2021-08-17 NOTE — H&P
[facility] Consult/H&P Note      Referring Provider: Kayy Box,*  Primary Provider:  Amanda Smith PA  Reason for Consultation: SSS    Problem list:    1. SSS  a. Exercise MPS 8/6/20, OSH: No perfusion defect, EF 53%  b. Occasional asymptomatic junctional rhythm per previous cardiologist note  c. Zio patch 8/21/20, 13 days, OSH: min 31, max 152, avg 56. No pauses greater than 2 seconds. Second degree type 1 AV. < 1% PAC burden. 8.2% PVC burden.   d. 2 week monitor 6/30-7/14-21: (56). SR, junctional, PVCs, PACs, isorhythmic dissociation and 4.7 sec pause at 9:30 am  e. Echo 7/9/21: EF 55%, mild LVH, trace MR, trace TR.   2. Premature ventricular contractions  3. Zio patch 8/21/20: 8.2% PVC burden  4. Thoracic aneurysm  a. CT chest 1/4/19, OSH: aortic root dilation, 4.2 cm.   b. CT chest 7/15/19, OSH: aortic root dilation, 4.2 cm.   c. CT chest 1/22/20, OSH: dilated aortic root 4.2 cm.   d. CT chest 5/6/2020, OSH: dilated aortic root, 4.2 cm  5. Lung nodules  6. Hyperlipidemia  a. FLP 12/22/20: , Trig 94, HDL 37, .         HISTORY OF PRESENT ILLNESS:  David Barfield is a 71 y.o. male with the above noted pmhx who presents today for DDD PPM implant. He has known history of bradycardia. He wore a 2 week monitor which revealed a 4.5 second pause during waking hours, junctional rhythm, sinus nolberto and isorhythmic dissociation. It was therefore recommended that patient present for DDD PPM implant.       REVIEW OF SYSTEMS  Pertinent positives and negatives are listed in the HPI.      SOCIAL HISTORY:   reports that he has been smoking cigarettes. He has a 25.00 pack-year smoking history. He has never used smokeless tobacco. He reports that he does not drink alcohol and does not use drugs.     FAMILY HISTORY:  family history includes Aneurysm in his mother; Dementia in his father; Leukemia in his sister.     CURRENT MEDICATIONS:      Current Facility-Administered Medications:  "  •  ceFAZolin in dextrose (ANCEF) IVPB solution 2 g, 2 g, Intravenous, Once, Carlos, Sonia P, APRN  •  sodium chloride 0.9 % flush 10 mL, 10 mL, Intravenous, PRN, Carlos, Manatee P, APRN  •  sodium chloride 0.9 % flush 10 mL, 10 mL, Intravenous, PRN, Carlos, Sonia P, APRN  •  sodium chloride 0.9 % flush 3 mL, 3 mL, Intravenous, Q12H, Carlos, Manatee P, APRN  •  sodium chloride 0.9 % flush 3 mL, 3 mL, Intravenous, Q12H, Carlos, Manatee P, APRN     Allergies:  Patient has no known allergies.    Objective     Vital Sign Min/Max for last 24 hours  Temp  Min: 97.8 °F (36.6 °C)  Max: 97.8 °F (36.6 °C)   BP  Min: 166/93  Max: 167/119   No data recorded   No data recorded   SpO2  Min: 100 %  Max: 100 %   No data recorded   Weight  Min: 82.7 kg (182 lb 6.4 oz)  Max: 82.7 kg (182 lb 6.4 oz)     Flowsheet Rows      First Filed Value   Admission Height  182.9 cm (72\") Documented at 08/17/2021 0625   Admission Weight  82.7 kg (182 lb 6.4 oz) Documented at 08/17/2021 0625          PHYSICAL EXAM:  GENERAL: This is a well-developed, well-nourished, male who is in no acute distress.   SKIN: Pink and warm without rash or abnormality noted.   HEENT: Head is normocephalic and atraumatic. Pupils are equal and reactive to light bilaterally. Mucous membranes are pink and moist.   NECK: Supple without lymphadenopathy or thyromegaly. There is no jugular venous distention at 30°.  LUNGS: Clear to auscultation bilaterally without wheezing, rhonchi, or rales noted.   CARDIOVASCULAR: The heart has a regular rate with a normal S1 and S2. There is no murmur, gallop, rub, or click appreciated. The PMI is nondisplaced. Carotid upstrokes are 2+ and symmetrical without bruits.   ABDOMEN: Soft and nondistended with positive bowel sounds x4. The patient denies tenderness of palpitation.   MUSCULOSKELETAL: There are no obvious bony abnormalities. Normal range of tenderness to palpation.   NEUROLOGICAL: Nonfocal. Alert and oriented x3.   PERIPHERAL VASCULAR: " Femoral pulses are 2+ and symmetrical without bruits. Posterior tibial and dorsalis pedis pulses are 2+ and symmetrical. There is no peripheral edema.   PSYCH: Normal mood and affect.      EKG:    Tele: Sinus nolberto    LABS:      Lab Results   Component Value Date    WBC 8.96 08/13/2021    HGB 13.9 08/13/2021    HCT 41.8 08/13/2021    MCV 96.5 08/13/2021     08/13/2021     Lab Results   Component Value Date    GLUCOSE 90 08/13/2021    BUN 10 08/13/2021    CREATININE 0.99 08/13/2021    EGFRIFNONA 74 08/13/2021    BCR 10.1 08/13/2021    K 4.7 08/13/2021    CO2 25.0 08/13/2021    CALCIUM 9.5 08/13/2021    ALBUMIN 4.10 04/19/2021    AST 15 04/19/2021    ALT 11 04/19/2021     No results found for: CKTOTAL, CKMB, CKMBINDEX, TROPONINI, TROPONINT  No results found for: INR, PROTIME  No results found for: CHOL, CHLPL, TRIG, HDL, LDL, LDLDIRECT     Results Review: I reviewed the patient's new clinical results.      ASSESSMENT:    1.  Sick sinus syndrome  high degree AV block  Patient presents today for DDD PPM implant. The risks, benefits, and alternatives of the procedure have been reviewed and the patient wishes to proceed.       PLAN:    Proceed as planned      Electronically signed by ANAMIKA Metz, 08/17/21, 7:44 AM EDT.    Kayy Box MD, FACC

## 2021-08-17 NOTE — OUTREACH NOTE
Prep Survey      Responses   [facility] patient discharged from?  [place]   Is LACE score < 7 ?  No   Emergency Room discharge w/ pulse ox?  Yes   Eligibility  Not Eligible   [facility]   Date of Admission  08/17/21   Date of Discharge  08/17/21   Discharge Disposition  Home or Self Care   What are the reasons patient is not eligible?  Other [OP Proc]   Discharge diagnosis  DDD PPM implant   Does the patient have one of the following disease processes/diagnoses(primary or secondary)?  Other   Does the patient have Home health ordered?  No   Is there a DME ordered?  No   Prep survey completed?  Yes          Doreen Argueta RN

## 2021-08-18 ENCOUNTER — CALL CENTER PROGRAMS (OUTPATIENT)
Dept: CALL CENTER | Facility: HOSPITAL | Age: 71
End: 2021-08-18

## 2021-08-18 ENCOUNTER — OFFICE VISIT (OUTPATIENT)
Dept: CARDIOLOGY | Facility: CLINIC | Age: 71
End: 2021-08-18

## 2021-08-18 VITALS
HEIGHT: 72 IN | OXYGEN SATURATION: 97 % | DIASTOLIC BLOOD PRESSURE: 60 MMHG | WEIGHT: 184.2 LBS | HEART RATE: 61 BPM | BODY MASS INDEX: 24.95 KG/M2 | SYSTOLIC BLOOD PRESSURE: 122 MMHG

## 2021-08-18 DIAGNOSIS — I44.39 HIGH DEGREE ATRIOVENTRICULAR BLOCK: Primary | ICD-10-CM

## 2021-08-18 PROCEDURE — 99024 POSTOP FOLLOW-UP VISIT: CPT | Performed by: INTERNAL MEDICINE

## 2021-08-18 RX ORDER — SILDENAFIL 100 MG/1
50 TABLET, FILM COATED ORAL DAILY PRN
COMMUNITY

## 2021-08-18 NOTE — OUTREACH NOTE
PCI/Device Survey      Responses   Facility patient discharged from?  [place]   Procedure date  08/17/21   Procedure (if device, specify in description)  Device   Device Description  pacemaker   Performing MD  Other (annotate) [Dr Box]   Attempt successful?  Yes   Call start time  0914   Call end time  0924   Has the patient had any of the following symptoms since discharge?  -- [Patient reports just chest soreness at site. ]   Is the patient taking prescribed medications:  ASA [Low dose 81mg aspirin a day. ]   Nursing intervention  Reminded to continue to take prescribed medications   Does the patient have any of the following symptoms related to the cath/surgical site?  -- [Patient denies any symptoms this am. ]   Does the patient have an appointment scheduled with the cardiologist?  Yes   Appointment comments  Hospital Follow Up with Kayy Box MD Wednesday Aug 25, 2021 1:00 PM   Did the patient feel prepared to go home on the same day as the procedure?  Yes   Is the patient satisfied with the same day discharge process?  Yes   PCI/Device call completed  Yes   Wrap up additional comments  Patient reports wearing sling to right arm today. Verbalizes awareness of activity restrictions. Denies further questions today.           Berta Koenig RN

## 2021-08-18 NOTE — PROGRESS NOTES
Mr. Barfield returns for follow-up approximately 24 hours after pacemaker placement.  He was concerned about redness that was beneath the pacemaker moving caudally and toward the axillary line.  He denies fevers and chills.  He states that the area is not sore and does not itch.    On examination this appears to be bruising from pocket formation yesterday.  He was reassured.  The incision site through the bandage appears to be without hematoma and no obvious erythema seen.  No bleeding is noted.    He will keep his follow-up next week for a wound check at which time the bandage will be removed.    Kayy Box MD, FACC

## 2021-08-25 ENCOUNTER — OFFICE VISIT (OUTPATIENT)
Dept: CARDIOLOGY | Facility: CLINIC | Age: 71
End: 2021-08-25

## 2021-08-25 VITALS
BODY MASS INDEX: 25.27 KG/M2 | HEART RATE: 67 BPM | WEIGHT: 186.6 LBS | DIASTOLIC BLOOD PRESSURE: 58 MMHG | SYSTOLIC BLOOD PRESSURE: 104 MMHG | HEIGHT: 72 IN | OXYGEN SATURATION: 97 %

## 2021-08-25 DIAGNOSIS — R00.1 BRADYCARDIA, SINUS: ICD-10-CM

## 2021-08-25 DIAGNOSIS — I44.39 HIGH DEGREE ATRIOVENTRICULAR BLOCK: Primary | ICD-10-CM

## 2021-08-25 PROCEDURE — 99024 POSTOP FOLLOW-UP VISIT: CPT | Performed by: INTERNAL MEDICINE

## 2021-08-25 NOTE — PROGRESS NOTES
[facility] Cardiology    Patient ID: David Barfield is a 71 y.o. male.  : 1950   Contact: [phone]    Encounter date: 2021    PCP: Amanda Smith PA      Chief complaint:   Chief Complaint   Patient presents with   • High degree atrioventricular block     Problem List:  1. Sick sinus syndrome  a. Exercise MPS 20, OSH: No perfusion defect, EF 53%  b. Occasional asymptomatic junctional rhythm per previous cardiologist note  c. Zio patch 20, 13 days, OSH: min 31, max 152, avg 56. No pauses greater than 2 seconds. Second degree type 1 AV. < 1% PAC burden. 8.2% PVC burden.   d. 2 week monitor --21: (56). SR, junctional, PVCs, PACs, isorhythmic dissociation and 4.7 sec pause at 9:30 am  e. Echo 21: EF 55%, mild LVH, trace MR, trace TR.   f. PPM implantation using a [employer] Accolade MRI DR model L3 1 1 serial #[id number]  2. Premature ventricular contractions  3. Zio patch 20: 8.2% PVC burden  4. Thoracic aneurysm  a. CT chest 19, OSH: aortic root dilation, 4.2 cm.   b. CT chest 7/15/19, OSH: aortic root dilation, 4.2 cm.   c. CT chest 20, OSH: dilated aortic root 4.2 cm.   d. CT chest 2020, OSH: dilated aortic root, 4.2 cm  5. Lung nodules  6. Hyperlipidemia  a. FLP 20: , Trig 94, HDL 37, .     No Known Allergies    Current Medications:    Current Outpatient Medications:   •  albuterol sulfate  (90 Base) MCG/ACT inhaler, Inhale 2 puffs Every 4 (Four) Hours As Needed for Wheezing or Shortness of Air., Disp: 18 g, Rfl: 2  •  aspirin 81 MG EC tablet, Take 81 mg by mouth Daily., Disp: , Rfl:   •  lisinopril (PRINIVIL,ZESTRIL) 2.5 MG tablet, Take 2.5 mg by mouth As Needed. Take 1/2 tablet po prn , Disp: , Rfl:   •  pravastatin (PRAVACHOL) 80 MG tablet, Take 80 mg by mouth Every Night., Disp: , Rfl:   •  sildenafil (VIAGRA) 100 MG tablet, Take 50 mg by mouth Daily As Needed for Erectile Dysfunction.,  "Disp: , Rfl:     HPI    David Barfield is a 71 y.o. male who presents today for a wound check s/p pacemaker placement 8/17/2021. Patient has done well overall. Bruising has resolved. Bandage was removed. Edges are well approximated. No signs/symptoms of infection.       The following portions of the patient's history were reviewed and updated as appropriate: allergies, current medications and problem list.    Pertinent positives as listed in the HPI.  All other systems reviewed are negative.         Vitals:    08/25/21 1302   BP: 104/58   BP Location: Left arm   Patient Position: Sitting   Pulse: 67   SpO2: 97%   Weight: 84.6 kg (186 lb 9.6 oz)   Height: 182.9 cm (72\")         Diagnostic Data (reviewed with patient):     Lab Results   Component Value Date    GLUCOSE 100 (H) 08/17/2021    BUN 8 08/17/2021    CREATININE 0.91 08/17/2021    EGFRIFNONA 82 08/17/2021    BCR 8.8 08/17/2021     08/17/2021    K 4.5 08/17/2021     08/17/2021    CO2 25.0 08/17/2021    CALCIUM 9.1 08/17/2021    ALBUMIN 4.10 04/19/2021    ALKPHOS 88 04/19/2021    AST 15 04/19/2021    ALT 11 04/19/2021     Lab Results   Component Value Date    WBC 8.96 08/13/2021    RBC 4.33 08/13/2021    HGB 13.9 08/13/2021    HCT 41.8 08/13/2021    MCV 96.5 08/13/2021     08/13/2021        Procedures      Assessment:    ICD-10-CM ICD-9-CM   1. High degree atrioventricular block  I44.39 426.10   2. Bradycardia, sinus  R00.1 427.89         Plan:  1. F/u 3 months for device check.       Electronically signed by ANAMIKA Metz, 08/25/21, 1:18 PM EDT.              "

## 2021-11-16 NOTE — PROGRESS NOTES
[facility] Cardiology    Patient ID: David Barfield is a 71 y.o. male.  : 1950   Contact: [phone]    Encounter date: 2021    PCP: Amanda Smith PA      Chief complaint:   Chief Complaint   Patient presents with   • High degree atrioventricular block   • PVC's (premature ventricular contractions)     Problem List:  1. Sick sinus syndrome  a. Exercise MPS 20, OSH: No perfusion defect, EF 53%  b. Occasional asymptomatic junctional rhythm per previous cardiologist note  c. Zio patch 20, 13 days, OSH: min 31, max 152, avg 56. No pauses greater than 2 seconds. Second degree type 1 AV. < 1% PAC burden. 8.2% PVC burden.   d. 2 week monitor --: (56). SR, junctional, PVCs, PACs, isorhythmic dissociation and 4.7 sec pause at 9:30 am  e. Echo 21: EF 55%, mild LVH, trace MR, trace TR.   f. PPM implantation, 2021: [employer] Accolade MRI DR model L3 1 1 serial #[id number]  2. Premature ventricular contractions  3. Zio patch 20: 8.2% PVC burden  4. Thoracic aneurysm  a. CT chest 19, OSH: aortic root dilation, 4.2 cm.   b. CT chest 7/15/19, OSH: aortic root dilation, 4.2 cm.   c. CT chest 20, OSH: dilated aortic root 4.2 cm.   d. CT chest 2020, OSH: dilated aortic root, 4.2 cm  5. Lung nodules  6. Hyperlipidemia  a. FLP 20: , Trig 94, HDL 37, .     No Known Allergies    Current Medications:    Current Outpatient Medications:   •  albuterol sulfate  (90 Base) MCG/ACT inhaler, Inhale 2 puffs Every 4 (Four) Hours As Needed for Wheezing or Shortness of Air., Disp: 18 g, Rfl: 2  •  aspirin 81 MG EC tablet, Take 81 mg by mouth Daily., Disp: , Rfl:   •  lisinopril (PRINIVIL,ZESTRIL) 2.5 MG tablet, Take 2.5 mg by mouth As Needed. Take 1/2 tablet po prn , Disp: , Rfl:   •  pravastatin (PRAVACHOL) 80 MG tablet, Take 80 mg by mouth Every Night., Disp: , Rfl:   •  sildenafil (VIAGRA) 100 MG tablet, Take 50 mg  "by mouth Daily As Needed for Erectile Dysfunction., Disp: , Rfl:     HPI    David Barfield is a 71 y.o. male who presents today for a 3 month follow up of sick sinus syndrome s/p PPM implantation, bradycardia, high degree atrioventricular block, and cardiac risk factors. Since last visit, patient has done well overall. He has more energy. No chest pain, SOA, PND, orthopnea. BP has been low at times. He drinks an excessive amount of caffeine and very little water.        The following portions of the patient's history were reviewed and updated as appropriate: allergies, current medications and problem list.    Pertinent positives as listed in the HPI.  All other systems reviewed are negative.         Vitals:    11/17/21 1102   BP: 112/64   BP Location: Left arm   Patient Position: Sitting   Pulse: 68   SpO2: 98%   Weight: 86.2 kg (190 lb)   Height: 182.9 cm (72\")       Physical Exam:  General: Alert and oriented.  Neck: Jugular venous pressure is within normal limits. Carotids have normal upstrokes without bruits.   Cardiovascular: Heart has a nondisplaced focal PMI. Regular rate and rhythm. No murmur, gallop or rub.  Lungs: Clear, no rales or wheezes. Equal expansion is noted.   Extremities: Show no edema.  Skin: Warm and dry.  Neurologic: Nonfocal.     Diagnostic Data (reviewed with patient):  Lab Results   Component Value Date    GLUCOSE 100 (H) 08/17/2021    BUN 8 08/17/2021    CREATININE 0.91 08/17/2021    EGFRIFNONA 82 08/17/2021    BCR 8.8 08/17/2021     08/17/2021    K 4.5 08/17/2021     08/17/2021    CO2 25.0 08/17/2021    CALCIUM 9.1 08/17/2021    ALBUMIN 4.10 04/19/2021    ALKPHOS 88 04/19/2021    AST 15 04/19/2021    ALT 11 04/19/2021     Lab Results   Component Value Date    WBC 8.96 08/13/2021    RBC 4.33 08/13/2021    HGB 13.9 08/13/2021    HCT 41.8 08/13/2021    MCV 96.5 08/13/2021     08/13/2021      Device check DDDR: RA 91%, RV 4%, changed rate to 70 bpm to \"out run\" PVCs. " Normal threshold and impedance, battery life 8.5 years. No events.     Procedures      Assessment:    ICD-10-CM ICD-9-CM   1. SSS (sick sinus syndrome) (HCC)  I49.5 427.81   2. Presence of cardiac pacemaker  Z95.0 V45.01   3. High degree atrioventricular block  I44.39 426.10   4. Bradycardia, sinus  R00.1 427.89   5. Mixed hyperlipidemia  E78.2 272.2         Plan:  1. Patient was counseled to begin aerobic exercise 30 min per day for at least 4 days per week.   2. Encouraged patient to drink more water and avoid caffeine to reduce episodes of hypotension. Counseled on need to drink more water and eat salty foods for low blood pressures.   3. Continue on aspirin 81 mg for antiplatelet therapy.   4. Continue on lisinopril 2.5 mg PRN for hypertension.   5. Continue on pravastatin 80 mg nightly for hyperlipidemia.   6. Continue all other current medications.  7. F/up in 6 months, sooner if needed.      Electronically signed by ANAMIKA Metz, 11/17/21, 11:58 AM EST.

## 2021-11-17 ENCOUNTER — OFFICE VISIT (OUTPATIENT)
Dept: CARDIOLOGY | Facility: CLINIC | Age: 71
End: 2021-11-17

## 2021-11-17 VITALS
WEIGHT: 190 LBS | HEART RATE: 68 BPM | BODY MASS INDEX: 25.73 KG/M2 | OXYGEN SATURATION: 98 % | HEIGHT: 72 IN | DIASTOLIC BLOOD PRESSURE: 64 MMHG | SYSTOLIC BLOOD PRESSURE: 112 MMHG

## 2021-11-17 DIAGNOSIS — I49.3 PVC'S (PREMATURE VENTRICULAR CONTRACTIONS): ICD-10-CM

## 2021-11-17 DIAGNOSIS — I44.39 HIGH DEGREE ATRIOVENTRICULAR BLOCK: ICD-10-CM

## 2021-11-17 DIAGNOSIS — Z95.0 PRESENCE OF CARDIAC PACEMAKER: ICD-10-CM

## 2021-11-17 DIAGNOSIS — R00.1 BRADYCARDIA, SINUS: ICD-10-CM

## 2021-11-17 DIAGNOSIS — E78.2 MIXED HYPERLIPIDEMIA: ICD-10-CM

## 2021-11-17 DIAGNOSIS — I49.5 SSS (SICK SINUS SYNDROME) (HCC): Primary | ICD-10-CM

## 2021-11-17 DIAGNOSIS — I95.89 CHRONIC HYPOTENSION: ICD-10-CM

## 2021-11-17 PROCEDURE — 93280 PM DEVICE PROGR EVAL DUAL: CPT | Performed by: NURSE PRACTITIONER

## 2021-11-18 RX ORDER — PRAVASTATIN SODIUM 80 MG/1
80 TABLET ORAL NIGHTLY
Qty: 30 TABLET | Refills: 11 | Status: SHIPPED | OUTPATIENT
Start: 2021-11-18

## 2021-11-18 RX ORDER — LISINOPRIL 2.5 MG/1
2.5 TABLET ORAL AS NEEDED
Qty: 30 TABLET | Refills: 1 | Status: SHIPPED | OUTPATIENT
Start: 2021-11-18

## 2021-12-16 ENCOUNTER — TELEPHONE (OUTPATIENT)
Dept: CARDIOLOGY | Facility: CLINIC | Age: 71
End: 2021-12-16

## 2021-12-16 NOTE — TELEPHONE ENCOUNTER
Mr. Watson called me back and I let him know his home monitor didn't send in its scheduled reading. I asked him if he slept next to it last night and he told me that he stayed over at someone else's home. I told him that it will read him once he returns home.

## 2021-12-16 NOTE — TELEPHONE ENCOUNTER
Tried to call Mr. Barfield to let him know that his home monitor didn't send in its scheduled reading. There was no answer and no way to leave a voicemail.

## 2022-02-16 ENCOUNTER — TELEMEDICINE (OUTPATIENT)
Dept: INTERNAL MEDICINE | Facility: CLINIC | Age: 72
End: 2022-02-16

## 2022-02-16 VITALS
HEART RATE: 74 BPM | DIASTOLIC BLOOD PRESSURE: 72 MMHG | OXYGEN SATURATION: 97 % | WEIGHT: 190 LBS | BODY MASS INDEX: 25.73 KG/M2 | SYSTOLIC BLOOD PRESSURE: 118 MMHG | TEMPERATURE: 97.8 F | HEIGHT: 72 IN

## 2022-02-16 DIAGNOSIS — J32.9 RECURRENT SINUSITIS: Primary | ICD-10-CM

## 2022-02-16 DIAGNOSIS — R05.9 COUGH: ICD-10-CM

## 2022-02-16 LAB
EXPIRATION DATE: NORMAL
FLUAV AG NPH QL: NEGATIVE
FLUBV AG NPH QL: NEGATIVE
INTERNAL CONTROL: NORMAL
Lab: NORMAL
SARS-COV-2 RNA NOSE QL NAA+PROBE: NOT DETECTED

## 2022-02-16 PROCEDURE — U0005 INFEC AGEN DETEC AMPLI PROBE: HCPCS | Performed by: PHYSICIAN ASSISTANT

## 2022-02-16 PROCEDURE — U0004 COV-19 TEST NON-CDC HGH THRU: HCPCS | Performed by: PHYSICIAN ASSISTANT

## 2022-02-16 PROCEDURE — 99214 OFFICE O/P EST MOD 30 MIN: CPT | Performed by: PHYSICIAN ASSISTANT

## 2022-02-16 PROCEDURE — 87804 INFLUENZA ASSAY W/OPTIC: CPT | Performed by: PHYSICIAN ASSISTANT

## 2022-02-16 RX ORDER — AMOXICILLIN AND CLAVULANATE POTASSIUM 875; 125 MG/1; MG/1
1 TABLET, FILM COATED ORAL 2 TIMES DAILY
Qty: 20 TABLET | Refills: 0 | Status: SHIPPED | OUTPATIENT
Start: 2022-02-16 | End: 2022-05-18

## 2022-02-16 RX ORDER — LORATADINE 10 MG/1
10 TABLET ORAL DAILY
Qty: 90 TABLET | Refills: 1 | OUTPATIENT
Start: 2022-02-16 | End: 2022-10-21

## 2022-02-16 NOTE — PROGRESS NOTES
Chief Complaint   Patient presents with   • Cough   • Shortness of Breath   • Nasal Congestion       Subjective     David Barfield is a 71 y.o. male.        History of Present Illness     Pt has chronic nasal congestion with drainage. Often progresses to sinus infection. He does not feel any worse than normal. His right eye will start to water and his right side of his face feels puffy. Blows clear drainage out of his nose. Has chronic cough, nothing out of ordinary.    He has been lving with somone diagnosed with flu.        Current Outpatient Medications:   •  albuterol sulfate  (90 Base) MCG/ACT inhaler, Inhale 2 puffs Every 4 (Four) Hours As Needed for Wheezing or Shortness of Air., Disp: 18 g, Rfl: 2  •  aspirin 81 MG EC tablet, Take 81 mg by mouth Daily., Disp: , Rfl:   •  lisinopril (PRINIVIL,ZESTRIL) 2.5 MG tablet, Take 1 tablet by mouth As Needed (elevated blood pressure). Take 1/2 tablet po prn, Disp: 30 tablet, Rfl: 1  •  pravastatin (PRAVACHOL) 80 MG tablet, Take 1 tablet by mouth Every Night., Disp: 30 tablet, Rfl: 11  •  sildenafil (VIAGRA) 100 MG tablet, Take 50 mg by mouth Daily As Needed for Erectile Dysfunction., Disp: , Rfl:   •  amoxicillin-clavulanate (Augmentin) 875-125 MG per tablet, Take 1 tablet by mouth 2 (Two) Times a Day., Disp: 20 tablet, Rfl: 0  •  loratadine (Claritin) 10 MG tablet, Take 1 tablet by mouth Daily., Disp: 90 tablet, Rfl: 1     PMFSH  The following portions of the patient's history were reviewed and updated as appropriate: allergies, current medications, past family history, past medical history, past social history, past surgical history and problem list.    Review of Systems   Constitutional: Positive for fatigue. Negative for activity change and unexpected weight change.   HENT: Positive for congestion, postnasal drip and sore throat. Negative for ear pain.    Eyes: Negative for pain and discharge.   Respiratory: Positive for cough. Negative for chest  "tightness, shortness of breath and wheezing.    Cardiovascular: Negative for chest pain and palpitations.   Gastrointestinal: Negative for abdominal pain, diarrhea and vomiting.   Endocrine: Negative.    Genitourinary: Negative.    Musculoskeletal: Negative for joint swelling.   Skin: Negative for color change, rash and wound.   Allergic/Immunologic: Negative.    Neurological: Negative for seizures and syncope.   Psychiatric/Behavioral: Negative.        Objective   /72   Pulse 74   Temp 97.8 °F (36.6 °C)   Ht 182.9 cm (72\")   Wt 86.2 kg (190 lb)   SpO2 97%   BMI 25.77 kg/m²     Physical Exam  Vitals and nursing note reviewed.   Constitutional:       General: He is not in acute distress.     Appearance: He is well-developed. He is not toxic-appearing or diaphoretic.   HENT:      Head: Normocephalic and atraumatic. Hair is normal.      Right Ear: External ear normal. No drainage, swelling or tenderness. Tympanic membrane is retracted.      Left Ear: External ear normal. No drainage, swelling or tenderness. Tympanic membrane is retracted.      Nose: Mucosal edema present.      Mouth/Throat:      Mouth: No oral lesions.      Pharynx: Uvula midline. Posterior oropharyngeal erythema present. No oropharyngeal exudate or uvula swelling.   Eyes:      General: No scleral icterus.        Right eye: No discharge.         Left eye: No discharge.      Conjunctiva/sclera: Conjunctivae normal.      Pupils: Pupils are equal, round, and reactive to light.   Cardiovascular:      Rate and Rhythm: Normal rate and regular rhythm.      Heart sounds: Normal heart sounds. No murmur heard.  No gallop.    Pulmonary:      Effort: No respiratory distress.      Breath sounds: Normal breath sounds. No stridor. No wheezing or rales.   Chest:      Chest wall: No tenderness.   Abdominal:      Palpations: Abdomen is soft.      Tenderness: There is no abdominal tenderness.   Musculoskeletal:      Cervical back: Normal range of motion and " neck supple.   Skin:     General: Skin is warm and dry.      Findings: No rash.   Neurological:      Mental Status: He is alert and oriented to person, place, and time.      Motor: No abnormal muscle tone.   Psychiatric:         Behavior: Behavior normal.         Thought Content: Thought content normal.         Judgment: Judgment normal.         Results for orders placed or performed in visit on 02/16/22   COVID-19 PCR, [facility], NP SWAB IN [facility] VIRAL TRANSPORT MEDIA/ORAL SWISH 24-30 HR TAT - Swab, Nasopharynx    Specimen: Nasopharynx; Swab   Result Value Ref Range    SARS-CoV-2 MEGAN Not Detected Not Detected   POCT Influenza A/B    Specimen: Swab   Result Value Ref Range    Rapid Influenza A Ag Negative Negative    Rapid Influenza B Ag Negative Negative    Internal Control Passed Passed    Lot Number [id number]     Expiration Date 10/25/23         ASSESSMENT/PLAN    Diagnoses and all orders for this visit:    1. Recurrent sinusitis (Primary)  Comments:  Treat with augmentin as ordered. Discussed next step of CT sinuses vs ENT, pt declined.  Orders:  -     amoxicillin-clavulanate (Augmentin) 875-125 MG per tablet; Take 1 tablet by mouth 2 (Two) Times a Day.  Dispense: 20 tablet; Refill: 0  -     loratadine (Claritin) 10 MG tablet; Take 1 tablet by mouth Daily.  Dispense: 90 tablet; Refill: 1    2. Cough  -     POCT Influenza A/B  -     COVID-19 PCR, [facility], NP SWAB IN [facility] VIRAL TRANSPORT MEDIA/ORAL SWISH 24-30 HR TAT - Swab, Nasopharynx; Future  -     COVID-19 PCR, [facility], NP SWAB IN [facility] VIRAL TRANSPORT MEDIA/ORAL SWISH 24-30 HR TAT - Swab, Nasopharynx             Return if symptoms worsen or fail to improve, for Next scheduled follow up.

## 2022-05-16 ENCOUNTER — TELEPHONE (OUTPATIENT)
Dept: INTERNAL MEDICINE | Facility: CLINIC | Age: 72
End: 2022-05-16

## 2022-05-16 NOTE — TELEPHONE ENCOUNTER
Caller: ISAÍAS ANDERSON    Relationship: Emergency Contact    Best call back number:  [phone]     Who are you requesting to speak with (clinical staff, provider,  specific staff member):   CLINICAL     What was the call regarding:  PATIENT  WAS EXPOSED ON 5-13-22 TO COVID   AND NOW  HAS A COUGH    THE PATIENT WANTS TO KNOW WHERE HE CAN GET THE INFUSION IF HE TEST POSITIVE FOR COVID       Do you require a callback:  PLEASE CALL AND ADVISE

## 2022-05-16 NOTE — TELEPHONE ENCOUNTER
Called and informed patient that there are no longer infusions for Covid treatment.  Will let us know if positive covid test to see if qualifies for Paxlovid.

## 2022-05-17 NOTE — PROGRESS NOTES
[facility] Cardiology    Patient ID: David Barfield is a 72 y.o. male.  : 1950   Contact: [phone]    Encounter date: 2022    PCP: Amanda Smith PA      Chief complaint:   Chief Complaint   Patient presents with   • SSS (sick sinus syndrome) ([facility])     Problem List:  1. Sick sinus syndrome  a. Exercise MPS 20, OSH: No perfusion defect, EF 53%  b. Occasional asymptomatic junctional rhythm per previous cardiologist note  c. Zio patch 20, 13 days, OSH: min 31, max 152, avg 56. No pauses greater than 2 seconds. Second degree type 1 AV. < 1% PAC burden. 8.2% PVC burden.   d. 2 week monitor --: (56). SR, junctional, PVCs, PACs, isorhythmic dissociation and 4.7 sec pause at 9:30 am  e. Echo 21: EF 55%, mild LVH, trace MR, trace TR.   f. PPM implantation, 2021: [employer] Accolade MRI DR model L3 1 1 serial #[id number]  2. Premature ventricular contractions  3. Zio patch 20: 8.2% PVC burden  4. Thoracic aneurysm  a. CT chest 19, OSH: aortic root dilation, 4.2 cm.   b. CT chest 7/15/19, OSH: aortic root dilation, 4.2 cm.   c. CT chest 20, OSH: dilated aortic root 4.2 cm.   d. CT chest 2020, OSH: dilated aortic root, 4.2 cm  5. Tobacco abuse of 1 pack/day, ongoing as of 2022  6. Lung nodules  7. Hyperlipidemia  a. FLP 20: , Trig 94, HDL 37, .     No Known Allergies    Current Medications:    Current Outpatient Medications:   •  albuterol sulfate  (90 Base) MCG/ACT inhaler, Inhale 2 puffs Every 4 (Four) Hours As Needed for Wheezing or Shortness of Air., Disp: 18 g, Rfl: 2  •  aspirin 81 MG EC tablet, Take 81 mg by mouth Daily., Disp: , Rfl:   •  lisinopril (PRINIVIL,ZESTRIL) 2.5 MG tablet, Take 1 tablet by mouth As Needed (elevated blood pressure). Take 1/2 tablet po prn, Disp: 30 tablet, Rfl: 1  •  loratadine (Claritin) 10 MG tablet, Take 1 tablet by mouth Daily., Disp: 90 tablet, Rfl: 1  •  " pravastatin (PRAVACHOL) 80 MG tablet, Take 1 tablet by mouth Every Night., Disp: 30 tablet, Rfl: 11  •  sildenafil (VIAGRA) 100 MG tablet, Take 50 mg by mouth Daily As Needed for Erectile Dysfunction., Disp: , Rfl:     HPI    David Barfield is a 72 y.o. male who presents today for a 6 month follow up of sick sinus syndrome, high degree atrioventricular block, bradycardia, and cardiac risk factors. Since last visit, the patient's extent of activity level is mowing everyday and other physical activities as such. He mentions that his blood pressure is occasionally high when measured in the morning and his heart rate stays in the 70's.    He also smokes about 1 pack a day. He states that he has tried to quit a couple times but seems reluctant to trying again at the moment. [facility] those follow [facility]. Patient denies chest pain, shortness of breath, orthopnea, palpitations, edema, dizziness, and syncope.     The following portions of the patient's history were reviewed and updated as appropriate: allergies, current medications and problem list.    Pertinent positives as listed in the HPI.  All other systems reviewed are negative.     Vitals:    05/18/22 1049   BP: 122/70   BP Location: Left arm   Patient Position: Sitting   Pulse: 70   SpO2: 98%   Weight: 86.7 kg (191 lb 3.2 oz)   Height: 182.9 cm (72\")       Physical Exam:  General: Alert and oriented.  Neck: Jugular venous pressure is within normal limits. Carotids have normal upstrokes without bruits.   Cardiovascular: Heart has a nondisplaced focal PMI. Regular rate and rhythm. No murmur, gallop or rub.  Lungs: Rare right upper wheeze heard.. Equal expansion is noted.   Extremities: Show no edema.  Skin: Warm and dry.  Neurologic: Nonfocal.     Diagnostic Data (reviewed with patient):    Lab Results   Component Value Date    GLUCOSE 100 (H) 08/17/2021    BUN 8 08/17/2021    CREATININE 0.91 08/17/2021    EGFRIFNONA 82 " 08/17/2021    BCR 8.8 08/17/2021     08/17/2021    K 4.5 08/17/2021     08/17/2021    CO2 25.0 08/17/2021    CALCIUM 9.1 08/17/2021    ALBUMIN 4.10 04/19/2021    ALKPHOS 88 04/19/2021    AST 15 04/19/2021    ALT 11 04/19/2021     Lab Results   Component Value Date    WBC 8.96 08/13/2021    RBC 4.33 08/13/2021    HGB 13.9 08/13/2021    HCT 41.8 08/13/2021    MCV 96.5 08/13/2021     08/13/2021      Procedures     DEVICE INTERROGATION: [employer] /2016, Dual PPM: RA pacing 99%, RV pacing 2%. P wave is 3.1 mV with a threshold of <1.0 V at 0.4 msec and an impedance of 615 ohms. R wave is 9 mV with a threshold of 0.6 V at 0.4 msec and an impedance of 837 ohms. Battery voltage is 8.5 years. Events: 1 mode switch. (<1 min, <1% total). 2 VHR events. Updates: Increased respiratory rate: accelerated to med-lo/resp factor to 10. MV increased respiratory factor to 10.       Assessment:    ICD-10-CM ICD-9-CM   1. SSS (sick sinus syndrome) (HCC)  I49.5 427.81   2. Presence of cardiac pacemaker  Z95.0 V45.01   3. High degree atrioventricular block  I44.39 426.10   4. Bradycardia, sinus  R00.1 427.89   5. Mixed hyperlipidemia  E78.2 272.2       Plan:  1. Stable cardiac status.  2. Patient was advised [facility] to [facility] his [facility]lipidemia.   3. Strongly encouraged smoking cessation and counseled patient >3 minutes to prevent blockages in arteries.  4. Continue on aspirin 81 mg for antiplatelet therapy.   5. Continue on lisinopril 2.5 mg PRN for hypertension.   6. Continue on pravastatin 80 mg daily for hyperlipidemia.   7. Continue all other current medications.  8. F/up in 12 months, sooner if needed.    Scribed for Kayy Box MD by Hui Santillan. 5/18/2022 11:26 EDT      I Kayy Box MD personally performed the services described in this documentation as scribed by the above individual in my presence, and it is both accurate and complete.    Kayy  MD Isauro, FACC

## 2022-05-18 ENCOUNTER — OFFICE VISIT (OUTPATIENT)
Dept: CARDIOLOGY | Facility: CLINIC | Age: 72
End: 2022-05-18

## 2022-05-18 VITALS
WEIGHT: 191.2 LBS | SYSTOLIC BLOOD PRESSURE: 122 MMHG | OXYGEN SATURATION: 98 % | HEIGHT: 72 IN | DIASTOLIC BLOOD PRESSURE: 70 MMHG | HEART RATE: 70 BPM | BODY MASS INDEX: 25.9 KG/M2

## 2022-05-18 DIAGNOSIS — I49.5 SSS (SICK SINUS SYNDROME): Primary | ICD-10-CM

## 2022-05-18 DIAGNOSIS — R00.1 BRADYCARDIA, SINUS: ICD-10-CM

## 2022-05-18 DIAGNOSIS — E78.2 MIXED HYPERLIPIDEMIA: ICD-10-CM

## 2022-05-18 DIAGNOSIS — I44.39 HIGH DEGREE ATRIOVENTRICULAR BLOCK: ICD-10-CM

## 2022-05-18 DIAGNOSIS — Z95.0 PRESENCE OF CARDIAC PACEMAKER: ICD-10-CM

## 2022-05-18 PROCEDURE — 93280 PM DEVICE PROGR EVAL DUAL: CPT | Performed by: INTERNAL MEDICINE

## 2022-05-18 PROCEDURE — 99214 OFFICE O/P EST MOD 30 MIN: CPT | Performed by: INTERNAL MEDICINE

## 2022-09-25 PROCEDURE — 93294 REM INTERROG EVL PM/LDLS PM: CPT | Performed by: INTERNAL MEDICINE

## 2022-09-25 PROCEDURE — 93296 REM INTERROG EVL PM/IDS: CPT | Performed by: INTERNAL MEDICINE

## 2022-10-21 ENCOUNTER — APPOINTMENT (OUTPATIENT)
Dept: GENERAL RADIOLOGY | Facility: HOSPITAL | Age: 72
End: 2022-10-21

## 2022-10-21 ENCOUNTER — HOSPITAL ENCOUNTER (EMERGENCY)
Facility: HOSPITAL | Age: 72
Discharge: HOME | End: 2022-10-21
Attending: EMERGENCY MEDICINE | Admitting: EMERGENCY MEDICINE

## 2022-10-21 VITALS
WEIGHT: 185 LBS | SYSTOLIC BLOOD PRESSURE: 168 MMHG | BODY MASS INDEX: 25.06 KG/M2 | OXYGEN SATURATION: 95 % | DIASTOLIC BLOOD PRESSURE: 83 MMHG | HEIGHT: 72 IN | RESPIRATION RATE: 18 BRPM | HEART RATE: 72 BPM | TEMPERATURE: 98.2 F

## 2022-10-21 DIAGNOSIS — E86.9 VOLUME DEPLETION: ICD-10-CM

## 2022-10-21 DIAGNOSIS — J10.1 INFLUENZA A: ICD-10-CM

## 2022-10-21 DIAGNOSIS — R55 SYNCOPE AND COLLAPSE: Primary | ICD-10-CM

## 2022-10-21 LAB
ALBUMIN SERPL-MCNC: 3.9 G/DL (ref 3.5–5.2)
ALBUMIN/GLOB SERPL: 1.1 G/DL
ALP SERPL-CCNC: 85 U/L (ref 39–117)
ALT SERPL W P-5'-P-CCNC: 11 U/L (ref 1–41)
ANION GAP SERPL CALCULATED.3IONS-SCNC: 11 MMOL/L (ref 5–15)
AST SERPL-CCNC: 18 U/L (ref 1–40)
BASOPHILS # BLD AUTO: 0.03 10*3/MM3 (ref 0–0.2)
BASOPHILS NFR BLD AUTO: 0.4 % (ref 0–1.5)
BILIRUB SERPL-MCNC: 0.2 MG/DL (ref 0–1.2)
BUN SERPL-MCNC: 11 MG/DL (ref 8–23)
BUN/CREAT SERPL: 8.5 (ref 7–25)
CALCIUM SPEC-SCNC: 9 MG/DL (ref 8.6–10.5)
CHLORIDE SERPL-SCNC: 98 MMOL/L (ref 98–107)
CO2 SERPL-SCNC: 23 MMOL/L (ref 22–29)
CREAT SERPL-MCNC: 1.29 MG/DL (ref 0.76–1.27)
D-LACTATE SERPL-SCNC: 1.7 MMOL/L (ref 0.5–2)
DEPRECATED RDW RBC AUTO: 46.6 FL (ref 37–54)
EGFRCR SERPLBLD CKD-EPI 2021: 58.5 ML/MIN/1.73
EOSINOPHIL # BLD AUTO: 0 10*3/MM3 (ref 0–0.4)
EOSINOPHIL NFR BLD AUTO: 0 % (ref 0.3–6.2)
ERYTHROCYTE [DISTWIDTH] IN BLOOD BY AUTOMATED COUNT: 13.3 % (ref 12.3–15.4)
FLUAV RNA RESP QL NAA+PROBE: DETECTED
FLUBV RNA RESP QL NAA+PROBE: NOT DETECTED
GLOBULIN UR ELPH-MCNC: 3.7 GM/DL
GLUCOSE SERPL-MCNC: 104 MG/DL (ref 65–99)
HCT VFR BLD AUTO: 39.9 % (ref 37.5–51)
HGB BLD-MCNC: 13.5 G/DL (ref 13–17.7)
HOLD SPECIMEN: NORMAL
IMM GRANULOCYTES # BLD AUTO: 0.03 10*3/MM3 (ref 0–0.05)
IMM GRANULOCYTES NFR BLD AUTO: 0.4 % (ref 0–0.5)
LYMPHOCYTES # BLD AUTO: 1.4 10*3/MM3 (ref 0.7–3.1)
LYMPHOCYTES NFR BLD AUTO: 17 % (ref 19.6–45.3)
MAGNESIUM SERPL-MCNC: 2 MG/DL (ref 1.6–2.4)
MCH RBC QN AUTO: 32 PG (ref 26.6–33)
MCHC RBC AUTO-ENTMCNC: 33.8 G/DL (ref 31.5–35.7)
MCV RBC AUTO: 94.5 FL (ref 79–97)
MONOCYTES # BLD AUTO: 1.08 10*3/MM3 (ref 0.1–0.9)
MONOCYTES NFR BLD AUTO: 13.1 % (ref 5–12)
NEUTROPHILS NFR BLD AUTO: 5.69 10*3/MM3 (ref 1.7–7)
NEUTROPHILS NFR BLD AUTO: 69.1 % (ref 42.7–76)
NRBC BLD AUTO-RTO: 0 /100 WBC (ref 0–0.2)
PLATELET # BLD AUTO: 230 10*3/MM3 (ref 140–450)
PMV BLD AUTO: 8.7 FL (ref 6–12)
POTASSIUM SERPL-SCNC: 4.5 MMOL/L (ref 3.5–5.2)
PROT SERPL-MCNC: 7.6 G/DL (ref 6–8.5)
RBC # BLD AUTO: 4.22 10*6/MM3 (ref 4.14–5.8)
SARS-COV-2 RNA RESP QL NAA+PROBE: NOT DETECTED
SODIUM SERPL-SCNC: 132 MMOL/L (ref 136–145)
TROPONIN T SERPL-MCNC: <0.01 NG/ML (ref 0–0.03)
WBC NRBC COR # BLD: 8.23 10*3/MM3 (ref 3.4–10.8)
WHOLE BLOOD HOLD COAG: NORMAL
WHOLE BLOOD HOLD SPECIMEN: NORMAL

## 2022-10-21 PROCEDURE — 93005 ELECTROCARDIOGRAM TRACING: CPT | Performed by: EMERGENCY MEDICINE

## 2022-10-21 PROCEDURE — 71045 X-RAY EXAM CHEST 1 VIEW: CPT

## 2022-10-21 PROCEDURE — 85025 COMPLETE CBC W/AUTO DIFF WBC: CPT

## 2022-10-21 PROCEDURE — 93005 ELECTROCARDIOGRAM TRACING: CPT

## 2022-10-21 PROCEDURE — 87636 SARSCOV2 & INF A&B AMP PRB: CPT | Performed by: EMERGENCY MEDICINE

## 2022-10-21 PROCEDURE — 84484 ASSAY OF TROPONIN QUANT: CPT | Performed by: EMERGENCY MEDICINE

## 2022-10-21 PROCEDURE — 25010000002 ONDANSETRON PER 1 MG: Performed by: EMERGENCY MEDICINE

## 2022-10-21 PROCEDURE — 83605 ASSAY OF LACTIC ACID: CPT | Performed by: EMERGENCY MEDICINE

## 2022-10-21 PROCEDURE — 96361 HYDRATE IV INFUSION ADD-ON: CPT

## 2022-10-21 PROCEDURE — 83735 ASSAY OF MAGNESIUM: CPT | Performed by: EMERGENCY MEDICINE

## 2022-10-21 PROCEDURE — 96374 THER/PROPH/DIAG INJ IV PUSH: CPT

## 2022-10-21 PROCEDURE — 80053 COMPREHEN METABOLIC PANEL: CPT | Performed by: EMERGENCY MEDICINE

## 2022-10-21 PROCEDURE — 99284 EMERGENCY DEPT VISIT MOD MDM: CPT

## 2022-10-21 RX ORDER — SODIUM CHLORIDE 0.9 % (FLUSH) 0.9 %
10 SYRINGE (ML) INJECTION AS NEEDED
Status: DISCONTINUED | OUTPATIENT
Start: 2022-10-21 | End: 2022-10-21 | Stop reason: HOSPADM

## 2022-10-21 RX ORDER — ONDANSETRON 2 MG/ML
4 INJECTION INTRAMUSCULAR; INTRAVENOUS ONCE
Status: COMPLETED | OUTPATIENT
Start: 2022-10-21 | End: 2022-10-21

## 2022-10-21 RX ORDER — ONDANSETRON 4 MG/1
4 TABLET, ORALLY DISINTEGRATING ORAL EVERY 8 HOURS PRN
Qty: 10 TABLET | Refills: 0 | Status: SHIPPED | OUTPATIENT
Start: 2022-10-21

## 2022-10-21 RX ORDER — OSELTAMIVIR PHOSPHATE 75 MG/1
75 CAPSULE ORAL 2 TIMES DAILY
COMMUNITY

## 2022-10-21 RX ADMIN — SODIUM CHLORIDE 2000 ML: 9 INJECTION, SOLUTION INTRAVENOUS at 06:46

## 2022-10-21 RX ADMIN — ONDANSETRON 4 MG: 2 INJECTION INTRAMUSCULAR; INTRAVENOUS at 06:46

## 2022-10-21 NOTE — ED PROVIDER NOTES
Subjective   History of Present Illness  Mr. Barfield presents by ambulance after having a syncopal episode at home.  He was in bed and had the urge to vomit.  He got out of bed and passed out onto the floor.  He has had 2 days of nausea vomiting and diarrhea.  He has had runny nose and cough.  His wife has similar symptoms.  He denies injuring himself in the fall.  He denies pain anywhere.  He denies feeling short of breath.  His primary care provider prescribed Tamiflu for him yesterday and he started it last night.    History provided by:  Patient, spouse and medical records  Syncope  Episode history:  Single  Most recent episode:  Today  Timing:  Constant  Progression:  Resolved  Chronicity:  New  Context comment:  Hypotension and vomiting  Witnessed: yes    Relieved by:  Nothing  Exacerbated by: Standing up to go to the bathroom to vomit.  Ineffective treatments:  None tried  Associated symptoms: dizziness, fever, nausea, vomiting and weakness    Associated symptoms: no chest pain, no confusion, no difficulty breathing, no focal weakness, no headaches and no shortness of breath        Review of Systems   Constitutional: Positive for fever. Negative for chills.   HENT: Negative for congestion and rhinorrhea.    Respiratory: Negative for shortness of breath.    Cardiovascular: Positive for syncope. Negative for chest pain.   Gastrointestinal: Positive for nausea and vomiting. Negative for abdominal pain.   Genitourinary: Negative for difficulty urinating.   Musculoskeletal: Negative for neck pain.   Neurological: Positive for dizziness and weakness. Negative for focal weakness and headaches.   Psychiatric/Behavioral: Negative for confusion.   All other systems reviewed and are negative.      Past Medical History:   Diagnosis Date   • Abnormal ECG    • Arrhythmia 2019   • Asthma 2019    Emphysema, COPD   • Atrial fibrillation (HCC)    • Diabetes mellitus (HCC) Borderline   • Emphysema/COPD (HCC)    • Hyperlipidemia     • Hypertension 2019   • Mumps    • Mumps    • Sleep apnea 2019    Needs a test   • Slow to wake up after anesthesia        No Known Allergies    Past Surgical History:   Procedure Laterality Date   • BONE BIOPSY      broken bone surgery in his face   • CARDIAC ELECTROPHYSIOLOGY PROCEDURE N/A 08/17/2021    Procedure: Pacemaker DC new;  Surgeon: Kayy Box MD;  Location: [facility] CATH INVASIVE LOCATION;  Service: Cardiology;  Laterality: N/A;   • FACIAL FRACTURE SURGERY     • INSERT / REPLACE / REMOVE PACEMAKER  August 17, 2021       Family History   Problem Relation Age of Onset   • Aneurysm Mother         brain   • Dementia Father    • Leukemia Sister    • Hypertension Paternal Grandfather    • Heart disease Paternal Grandmother        Social History     Socioeconomic History   • Marital status: Single   Tobacco Use   • Smoking status: Every Day     Packs/day: 1.00     Years: 53.00     Pack years: 53.00     Types: Cigarettes     Start date: 1/1/1968   • Smokeless tobacco: Never   • Tobacco comments:     Still smoke   Vaping Use   • Vaping Use: Never used   Substance and Sexual Activity   • Alcohol use: Never   • Drug use: Never   • Sexual activity: Yes     Partners: Female     Birth control/protection: None           Objective   Physical Exam  Vitals and nursing note reviewed.   Constitutional:       General: He is not in acute distress.     Appearance: Normal appearance.   HENT:      Head: Normocephalic and atraumatic.      Nose: Nose normal. No congestion or rhinorrhea.      Mouth/Throat:      Mouth: Mucous membranes are moist.      Pharynx: No posterior oropharyngeal erythema.   Eyes:      General: No scleral icterus.     Conjunctiva/sclera: Conjunctivae normal.   Neck:      Comments: No JVD  Cardiovascular:      Rate and Rhythm: Normal rate and regular rhythm.      Heart sounds: No murmur heard.    No friction rub.   Pulmonary:      Effort: Pulmonary effort is normal. No respiratory distress.       Breath sounds: Normal breath sounds. No wheezing or rales.   Abdominal:      General: Bowel sounds are normal.      Palpations: Abdomen is soft.      Tenderness: There is no abdominal tenderness. There is no guarding or rebound.   Musculoskeletal:         General: No tenderness.      Cervical back: Normal range of motion and neck supple.      Right lower leg: No edema.      Left lower leg: No edema.   Skin:     General: Skin is warm and dry.      Capillary Refill: Capillary refill takes less than 2 seconds.      Coloration: Skin is not pale.   Neurological:      General: No focal deficit present.      Mental Status: He is alert and oriented to person, place, and time.      Coordination: Coordination normal.   Psychiatric:         Mood and Affect: Mood normal.         Behavior: Behavior normal.         Thought Content: Thought content normal.         Procedures           ED Course  ED Course as of 10/21/22 1558   Fri Oct 21, 2022   0825 I took him some water and applesauce.  He has been here several hours and has not had any further vomiting.  Systolic blood pressure is now 144.  I spoke with him about this and treatment.  He had Tamiflu with him that was prescribed yesterday and he has just taken a dose of that.  We will stand him up slowly and make sure he does not drop his pressure or become significantly dizzy.  If he does not then will discharge. [DT]   0832 He dropped his pressure from 1 44-1 29 but did not get dizzy.  He has been able to walk to the bathroom without any difficulty.  He is holding down applesauce and water.  Will discharge [DT]      ED Course User Index  [DT] Bertrand Lares MD                                           MDM  Number of Diagnoses or Management Options  Influenza A: new and requires workup  Syncope and collapse: new and requires workup  Volume depletion: new and requires workup     Amount and/or Complexity of Data Reviewed  Clinical lab tests: ordered and reviewed  Tests in the  radiology section of CPT®: ordered and reviewed  Decide to obtain previous medical records or to obtain history from someone other than the patient: yes  Obtain history from someone other than the patient: yes  Review and summarize past medical records: yes  Independent visualization of images, tracings, or specimens: yes    Patient Progress  Patient progress: improved      Final diagnoses:   Influenza A   Volume depletion   Syncope and collapse       ED Disposition  ED Disposition     ED Disposition   Discharge    Condition   Stable    Comment   --             Amanda Smith PA  [address] [id number] [phone]               Medication List      New Prescriptions    ondansetron ODT 4 MG disintegrating tablet  Commonly known as: ZOFRAN-ODT  Place 1 tablet on the tongue Every 8 (Eight) Hours As Needed for Vomiting or Nausea.        Stop    aspirin 81 MG EC tablet     loratadine 10 MG tablet  Commonly known as: Claritin           Where to Get Your Medications      These medications were sent to [id number] - [place], [place] - [address] [place] [address]  AT [facility] - [phone]  - [phone] [address] [facility] [address]    Phone: [phone]   · ondansetron ODT 4 MG disintegrating tablet          Bertrand Lares MD  10/21/22 8221

## 2022-10-21 NOTE — DISCHARGE INSTRUCTIONS
Drink extra fluids over the next couple of days.  Return if you have further fainting spells or any other concerns.  I have sent in a prescription for medicine for vomiting

## 2022-11-04 LAB
QT INTERVAL: 396 MS
QTC INTERVAL: 427 MS

## 2022-11-10 NOTE — TELEPHONE ENCOUNTER
----- Message from Kayy Box MD sent at 7/27/2021  3:20 PM EDT -----  Echocardiogram was normal.  The patient may proceed with dual-chamber permanent pacemaker placement.   With ? Atypical nevi recommending more thorough evaluation with dermatology.

## 2022-12-25 PROCEDURE — 93296 REM INTERROG EVL PM/IDS: CPT | Performed by: INTERNAL MEDICINE

## 2022-12-25 PROCEDURE — 93294 REM INTERROG EVL PM/LDLS PM: CPT | Performed by: INTERNAL MEDICINE

## 2023-07-05 PROBLEM — Z95.0 PRESENCE OF CARDIAC PACEMAKER: Status: ACTIVE | Noted: 2023-07-05

## 2023-07-05 PROBLEM — E78.2 MIXED HYPERLIPIDEMIA: Status: ACTIVE | Noted: 2023-07-05

## 2023-08-14 ENCOUNTER — TELEPHONE (OUTPATIENT)
Dept: CARDIOLOGY | Facility: CLINIC | Age: 73
End: 2023-08-14
Payer: MEDICARE

## 2023-08-14 NOTE — TELEPHONE ENCOUNTER
Caller: [email or web address]    Relationship: Emergency Contact    Best call back number: [phone]    What form or medical record are you requesting: LAST PN, LETTER STATING THAT PATIENTS PACEMAKER IS WORKING AT 99%, AND FOR ALL PATIENTS DX OF ISCHEMIC HEART DISEASE TO BE LISTED IN LETTER.     Who is requesting this form or medical record [facility] you: [facility]     How would you like to receive the form or medical records (pick-up, mail, fax):      Timeframe paperwork needed: ASAP    Additional notes: PATIENT NEEDS THIS INFORMATION TO APPLY FOR [facility] BENEFITS. PLEASE CALL TO DISCUSS.

## 2023-08-21 ENCOUNTER — OFFICE VISIT (OUTPATIENT)
Dept: INTERNAL MEDICINE | Facility: CLINIC | Age: 73
End: 2023-08-21
Payer: MEDICARE

## 2023-08-21 VITALS
WEIGHT: 190 LBS | DIASTOLIC BLOOD PRESSURE: 82 MMHG | SYSTOLIC BLOOD PRESSURE: 130 MMHG | HEART RATE: 69 BPM | TEMPERATURE: 97.1 F | HEIGHT: 72 IN | BODY MASS INDEX: 25.73 KG/M2 | OXYGEN SATURATION: 96 %

## 2023-08-21 DIAGNOSIS — R93.89 ABNORMAL CHEST CT: ICD-10-CM

## 2023-08-21 DIAGNOSIS — R91.1 LUNG NODULE: Primary | ICD-10-CM

## 2023-08-21 PROCEDURE — 99213 OFFICE O/P EST LOW 20 MIN: CPT | Performed by: NURSE PRACTITIONER

## 2023-08-29 ENCOUNTER — OFFICE VISIT (OUTPATIENT)
Dept: PULMONOLOGY | Facility: CLINIC | Age: 73
End: 2023-08-29
Payer: MEDICARE

## 2023-08-29 ENCOUNTER — PREP FOR SURGERY (OUTPATIENT)
Dept: OTHER | Facility: HOSPITAL | Age: 73
End: 2023-08-29
Payer: MEDICARE

## 2023-08-29 ENCOUNTER — PATIENT OUTREACH (OUTPATIENT)
Dept: ONCOLOGY | Facility: CLINIC | Age: 73
End: 2023-08-29
Payer: MEDICARE

## 2023-08-29 VITALS
TEMPERATURE: 98.2 F | OXYGEN SATURATION: 96 % | DIASTOLIC BLOOD PRESSURE: 74 MMHG | HEIGHT: 72 IN | WEIGHT: 193.4 LBS | BODY MASS INDEX: 26.19 KG/M2 | HEART RATE: 70 BPM | SYSTOLIC BLOOD PRESSURE: 132 MMHG

## 2023-08-29 DIAGNOSIS — R91.1 NODULE OF LOWER LOBE OF RIGHT LUNG: ICD-10-CM

## 2023-08-29 DIAGNOSIS — J44.9 COPD WITH ASTHMA: ICD-10-CM

## 2023-08-29 DIAGNOSIS — R05.9 COUGH, UNSPECIFIED TYPE: Primary | ICD-10-CM

## 2023-08-29 DIAGNOSIS — R91.1 NODULE OF LOWER LOBE OF RIGHT LUNG: Primary | ICD-10-CM

## 2023-08-29 DIAGNOSIS — Z87.891 HISTORY OF TOBACCO USE, PRESENTING HAZARDS TO HEALTH: ICD-10-CM

## 2023-08-29 DIAGNOSIS — R59.0 MEDIASTINAL ADENOPATHY: ICD-10-CM

## 2023-08-29 PROBLEM — J44.89 COPD WITH ASTHMA: Status: ACTIVE | Noted: 2023-08-29

## 2023-08-29 PROBLEM — J44.89 COPD WITH ASTHMA: Status: RESOLVED | Noted: 2023-08-29 | Resolved: 2023-08-29

## 2023-08-29 RX ORDER — ALBUTEROL SULFATE 90 UG/1
4 AEROSOL, METERED RESPIRATORY (INHALATION) ONCE
Status: COMPLETED | OUTPATIENT
Start: 2023-08-29 | End: 2023-08-29

## 2023-08-29 RX ORDER — FLUTICASONE FUROATE, UMECLIDINIUM BROMIDE AND VILANTEROL TRIFENATATE 100; 62.5; 25 UG/1; UG/1; UG/1
1 POWDER RESPIRATORY (INHALATION)
Qty: 3 EACH | Refills: 3 | Status: SHIPPED | OUTPATIENT
Start: 2023-08-29

## 2023-08-29 RX ORDER — ALBUTEROL SULFATE 90 UG/1
2 AEROSOL, METERED RESPIRATORY (INHALATION) EVERY 4 HOURS PRN
Qty: 18 G | Refills: 11 | Status: SHIPPED | OUTPATIENT
Start: 2023-08-29

## 2023-08-29 RX ADMIN — ALBUTEROL SULFATE 4 PUFF: 90 AEROSOL, METERED RESPIRATORY (INHALATION) at 12:15

## 2023-08-29 NOTE — PROGRESS NOTES
Initial Pulmonary Consult Note    Subjective   Reason for consultation: Lung nodule    David Barfield is a 73 y.o. male is being seen for consultation today at the request of ANAMIKA Bhat    History of Present Illness    73 y.o. male active smoker of 1/2 PPD up to 2 PPD for 50 years with a history of HTN, HL, here for evaluation of a right sided lung nodule.  Had bronchoscopy/EBUS in 2020 at the [facility] that was reportedly benign.  He worked in construction up to age 71.  Had PPM in 2021 by Dr. Box and is followed by Dr. Pop.      Patient has an appointment at the [facility] on September 13th for a procedure but patient/family do not want it done there.      Patient denies dyspnea, hemoptysis, unexplained weight loss, or palpable adenopathy.  He had an episode a few months ago during which time he had worsening dyspnea.  He does not take current inhalers.      The following portions of the patient's history were reviewed and updated as appropriate: allergies, current medications, past family history, past medical history, past social history, past surgical history, and problem list.    Active Ambulatory Problems     Diagnosis Date Noted    High degree atrioventricular block 06/30/2021    Bradycardia, sinus 06/30/2021    Chronic hypotension 06/30/2021    PVC's (premature ventricular contractions) 06/30/2021    Presence of cardiac pacemaker 07/05/2023    Mixed hyperlipidemia 07/05/2023    Nodule of lower lobe of right lung 08/29/2023    Mediastinal adenopathy 08/29/2023    Cough 08/29/2023     Resolved Ambulatory Problems     Diagnosis Date Noted    COPD with asthma 08/29/2023     Past Medical History:   Diagnosis Date    Abnormal ECG     Arrhythmia 2019    Asthma 2019    Atrial fibrillation     Diabetes mellitus Borderline    Emphysema/COPD     Hyperlipidemia     Hypertension 2019    Mumps     Mumps     Sleep apnea 2019    Slow to wake up after anesthesia        Past Surgical History:   Procedure  Laterality Date    BONE BIOPSY      broken bone surgery in his face    CARDIAC ELECTROPHYSIOLOGY PROCEDURE N/A 08/17/2021    Procedure: Pacemaker DC new;  Surgeon: Kayy Box MD;  Location: [facility] INVASIVE LOCATION;  Service: Cardiology;  Laterality: N/A;    FACIAL FRACTURE SURGERY      INSERT / REPLACE / REMOVE PACEMAKER  August 17, 2021       Family History   Problem Relation Age of Onset    Aneurysm Mother         brain    Dementia Father     Leukemia Sister     Hypertension Paternal Grandfather     Heart disease Paternal Grandmother        Social History     Socioeconomic History    Marital status: Single   Tobacco Use    Smoking status: Every Day     Packs/day: 1.00     Years: 53.00     Pack years: 53.00     Types: Cigarettes     Start date: 1/1/1968    Smokeless tobacco: Never    Tobacco comments:     Still smoke   Vaping Use    Vaping Use: Never used   Substance and Sexual Activity    Alcohol use: Never    Drug use: Never    Sexual activity: Yes     Partners: Female     Birth control/protection: None       No Known Allergies    Current Outpatient Medications   Medication Sig Dispense Refill    lisinopril (PRINIVIL,ZESTRIL) 2.5 MG tablet Take 1 tablet by mouth As Needed (elevated blood pressure). Take 1/2 tablet po prn 30 tablet 1    pravastatin (PRAVACHOL) 80 MG tablet Take 1 tablet by mouth Every Night. 30 tablet 11    sildenafil (VIAGRA) 100 MG tablet Take 0.5 tablets by mouth Daily As Needed for Erectile Dysfunction.      albuterol sulfate  (90 Base) MCG/ACT inhaler Inhale 2 puffs Every 4 (Four) Hours As Needed for Wheezing or Shortness of Air. 18 g 2    fluticasone (VERAMYST) 27.5 MCG/SPRAY nasal spray 2 sprays into the nostril(s) as directed by provider Daily.      Fluticasone-Umeclidin-Vilant (Trelegy Ellipta) 100-62.5-25 MCG/ACT inhaler Inhale 1 puff Daily. 3 each 3    ondansetron ODT (ZOFRAN-ODT) 4 MG disintegrating tablet Place 1 tablet on the tongue Every 8 (Eight) Hours As  "Needed for Vomiting or Nausea. 10 tablet 0    promethazine (PHENERGAN) Infuse 50 mL into a venous catheter.       No current facility-administered medications for this visit.       Review of Systems  All other systems were reviewed and are negative.  Exceptions are included in the HPI or as otherwise noted above.     Objective   Blood pressure 132/74, pulse 70, temperature 98.2 øF (36.8 øC), temperature source Infrared, height 182.9 cm (72\"), weight 87.7 kg (193 lb 6.4 oz), SpO2 96 %.  Physical Exam    Physical Exam:     Constitutional:    Alert, cooperative, in no acute distress   Head:    Normocephalic, without obvious abnormality, atraumatic   Eyes:            Lids and lashes normal, conjunctivae and sclerae normal, no   icterus, no pallor, corneas clear, PER   ENMT:   Ears appear intact with no abnormalities noted        Neck:   No adenopathy, supple, trachea midline, no thyromegaly, no JVD       Lungs/Resp:     Normal effort, symmetric chest rise, no crepitus, mild rales bilaterally, no chest wall tenderness                 Heart/CV:    Regular rhythm and normal rate, normal S1 and S2, no            murmur   Abdomen/GI:     Soft, non-tender, non-distended, normal bowel sounds   :     Deferred   Extremities/MSK:   No clubbing or cyanosis.  No edema.  Normal tone.  No deformities.    Pulses:   Pulses palpable and equal bilaterally   Skin:   No bleeding, bruising or rash   Heme/Lymph:   No cervical or supraclavicular adenopathy.    Neurologic:    Psychiatric:       Moves all extremities with no obvious focal motor deficit.  Cranial nerves 2 - 12 grossly intact   Oriented x 3, normal affect.          The above findings are documentation of my personal physical examination from today.  Electronically signed by:  Octaviano Sampson MD  08/29/23  14:28 EDT      PFTs:  Full pulmonary function testing was done on 8/29/2023.  Please see scanned PFT report for complete details.  FVC was 3.62 L or 78% predicted.  FEV1 " was 2.43 L or 72% predicted.  FEV1 FVC ratio 67%.  Postbronchodilator FEV1 2.78 L or 83% predicted, a 14% increase from prebronchodilator.  Maximal voluntary ventilation was 89 L/min or 69% predicted.  Total lung capacity was 6.45 L or 93% predicted.  Residual volume 2.74 L or 100% predicted.  DLCO 91% predicted.    Interpretation: Moderate obstruction with significant response to bronchodilator.  Low maximal voluntary ventilation.  No restriction.  No air trapping or hyperinflation.  Normal DLCO.  No prior study available for comparison.  Study is consistent with stage II COPD with asthmatic component.  I suspect there is some hilar adenopathy.  There is evidence of prior granulomatous disease.    Imaging:  I reviewed CT scans of the chest from August 10, 2023, July 19, 2022, July 20, 2021, January 22, 2020, July 15, 2019, and January 4, 2019.  Patient has a cavitary right lower lobe superior segment lung nodule that appears to have grown from the most recent CT scan and become more dense.  Looking back on prior CT scans this area has had varying densities and appears to have an underlying cystic structure.    Assessment & Plan   Problems Addressed this Visit          Pulmonary and Pneumonias    Nodule of lower lobe of right lung    Relevant Medications    albuterol sulfate HFA (PROVENTIL HFA;VENTOLIN HFA;PROAIR HFA) inhaler 4 puff (Completed)    Fluticasone-Umeclidin-Vilant (Trelegy Ellipta) 100-62.5-25 MCG/ACT inhaler    Other Relevant Orders    CT Chest Without Contrast    Cough - Primary    Relevant Medications    albuterol sulfate HFA (PROVENTIL HFA;VENTOLIN HFA;PROAIR HFA) inhaler 4 puff (Completed)    Other Relevant Orders    Complete PFT - Pre & Post Bronchodilator (Completed)    RESOLVED: COPD with asthma    Relevant Medications    albuterol sulfate HFA (PROVENTIL HFA;VENTOLIN HFA;PROAIR HFA) inhaler 4 puff (Completed)    Fluticasone-Umeclidin-Vilant (Trelegy Ellipta) 100-62.5-25 MCG/ACT inhaler        Symptoms and Signs    Mediastinal adenopathy     Diagnoses         Codes Comments    Cough, unspecified type    -  Primary ICD-10-CM: R05.9  ICD-9-CM: 786.2     COPD with asthma     ICD-10-CM: J44.9  ICD-9-CM: 493.20     Nodule of lower lobe of right lung     ICD-10-CM: R91.1  ICD-9-CM: 793.11     Mediastinal adenopathy     ICD-10-CM: R59.0  ICD-9-CM: 785.6             Discussion:    73 y.o. male active smoker of 1/2 PPD up to 2 PPD for 50 years with a history of HTN, HL, here for evaluation of a right sided lung nodule.  Had bronchoscopy/EBUS in 2020 at the [facility] that was reportedly benign.  He worked in construction up to age 71.  Had PPM in 2021 by Dr. Box and is followed by Dr. Pop.      Patient has an appointment at the [facility] on September 13th for a procedure but patient/family do not want it done there.      Patient denies dyspnea, hemoptysis, unexplained weight loss, or palpable adenopathy.  He had an episode a few months ago during which time he had worsening dyspnea.  He does not take current inhalers.      Evaluation today shows moderate obstruction on pulmonary function testing with significant response to bronchodilator.  I feel this is consistent with COPD with asthmatic component.  By history, the patient likely had a COPD exacerbation a couple months ago.    Review of imaging shows a right lower lobe superior segment nodule that appears to have grown in density.  This nodule is partially cavitary which likely reflects an underlying partially cystic structure.    Is unclear what is going on with this patient.  I cannot rule out the possibility of malignancy.  A PET CT scan has already been ordered and is scheduled for September 8 and I will review this.  Regardless, given the progression, the patient likely needs a diagnosis.  I am suspicious of an underlying infectious process such as mycobacterial disease or fungal disease infecting a prior postinflammatory cavity.  I will try to get  "records from the patient's prior lymph node biopsy, which he reports were, \"benign\".  Based on the history relayed, the patient does not seem like he received any specific treatment after the prior biopsy in terms of anti-inflammatories and denies any prior history of cancer treatment.    Plan:  1.  For right lower lobe superior segment nodule: Follow-up PET scan.  I will also plan for biopsy attempt using robotic navigational bronchoscopy.  I will obtain a planning CT scan for this.  PET scan will be helpful to aid in a specific lymph node biopsies as I will pay special attention to any positive findings on the PET scan.  I discussed risks, benefits, and alternatives to bronchoscopy including the risk of bleeding, lung damage/pneumothorax, respiratory failure, and death.  The patient is agreeable and we will proceed.  2.  For mediastinal adenopathy: Suspected right hilar and possible mediastinal adenopathy.  I will assess this further with endobronchial ultrasound during bronchoscopy with biopsies if indicated.  3.  For COPD with asthmatic component: Start Trelegy 100.  Albuterol rescue inhaler 2 puffs every 4-6 hours as needed.  4.  For history of tobacco use presenting hazards to health: Screening CT scans.  Smoking cessation counseling done.  5.  For health maintenance: Recommend yearly influenza vaccination.  Recommend pneumonia vaccination.         Immunization History   Administered Date(s) Administered    COVID-19 (PFIZER) BIVALENT 12+YRS 09/16/2022    COVID-19 (PFIZER) Purple Cap Monovalent 01/29/2021, 02/19/2021, 10/18/2021, 04/14/2022       Social History     Tobacco Use   Smoking Status Every Day    Packs/day: 1.00    Years: 53.00    Pack years: 53.00    Types: Cigarettes    Start date: 1/1/1968   Smokeless Tobacco Never   Tobacco Comments    Still smoke        David Barfield  reports that he has been smoking cigarettes. He started smoking about 55 years ago. He has a 53.00 pack-year smoking history. " He has never used smokeless tobacco.. I have educated him on the risk of diseases from using tobacco products such as cancer, COPD, and heart disease.     I advised him to quit and he is willing to quit. We have discussed the following method/s for tobacco cessation:  Counseling.  Together we have set a quit date for 2 weeks from today.  He will follow up with me in 3 months or sooner to check on his progress.    I spent 3  minutes counseling the patient.            Advance Care Planning   ACP discussion was held with the patient during this visit. Patient has an advance directive in EMR which is still valid.  [facility] form given 8/29/2023.        I personally spent a total of 68 minutes on patient visit today including chart review, face to face with the patient obtaining the history and physical exam, review of pertinent images and tests, counseling and discussion and/or coordination of care as described above, and documentation.  Total time excludes time spent on other separate services such as performing procedures or test interpretation, if applicable.      Electronically signed by:  Octaviano Sampson MD  08/29/23  14:28 EDT    Please note that portions of this note were completed with [employer] - a voice recognition program.

## 2023-08-30 RX ORDER — LIDOCAINE HYDROCHLORIDE 40 MG/ML
4 INJECTION, SOLUTION RETROBULBAR; TOPICAL ONCE
OUTPATIENT
Start: 2023-08-30 | End: 2023-08-30

## 2023-09-08 ENCOUNTER — PRE-ADMISSION TESTING (OUTPATIENT)
Dept: PREADMISSION TESTING | Facility: HOSPITAL | Age: 73
End: 2023-09-08
Payer: MEDICARE

## 2023-09-08 ENCOUNTER — HOSPITAL ENCOUNTER (OUTPATIENT)
Dept: PET IMAGING | Facility: HOSPITAL | Age: 73
Discharge: HOME | End: 2023-09-08
Payer: MEDICARE

## 2023-09-08 ENCOUNTER — HOSPITAL ENCOUNTER (OUTPATIENT)
Dept: CT IMAGING | Facility: HOSPITAL | Age: 73
Discharge: HOME | End: 2023-09-08
Payer: MEDICARE

## 2023-09-08 DIAGNOSIS — R91.1 NODULE OF LOWER LOBE OF RIGHT LUNG: ICD-10-CM

## 2023-09-08 DIAGNOSIS — R91.1 LUNG NODULE: ICD-10-CM

## 2023-09-08 DIAGNOSIS — R93.89 ABNORMAL CHEST CT: ICD-10-CM

## 2023-09-08 LAB
ALBUMIN SERPL-MCNC: 3.8 G/DL (ref 3.5–5.2)
ALBUMIN/GLOB SERPL: 1.1 G/DL
ALP SERPL-CCNC: 88 U/L (ref 39–117)
ALT SERPL W P-5'-P-CCNC: 7 U/L (ref 1–41)
ANION GAP SERPL CALCULATED.3IONS-SCNC: 9 MMOL/L (ref 5–15)
APTT PPP: 30.8 SECONDS (ref 22–39)
AST SERPL-CCNC: 13 U/L (ref 1–40)
BASOPHILS # BLD AUTO: 0.03 10*3/MM3 (ref 0–0.2)
BASOPHILS NFR BLD AUTO: 0.4 % (ref 0–1.5)
BILIRUB SERPL-MCNC: 0.3 MG/DL (ref 0–1.2)
BUN SERPL-MCNC: 7 MG/DL (ref 8–23)
BUN/CREAT SERPL: 6.9 (ref 7–25)
CALCIUM SPEC-SCNC: 9.2 MG/DL (ref 8.6–10.5)
CHLORIDE SERPL-SCNC: 94 MMOL/L (ref 98–107)
CO2 SERPL-SCNC: 30 MMOL/L (ref 22–29)
CREAT SERPL-MCNC: 1.01 MG/DL (ref 0.76–1.27)
DEPRECATED RDW RBC AUTO: 48.1 FL (ref 37–54)
EGFRCR SERPLBLD CKD-EPI 2021: 78 ML/MIN/1.73
EOSINOPHIL # BLD AUTO: 0.06 10*3/MM3 (ref 0–0.4)
EOSINOPHIL NFR BLD AUTO: 0.8 % (ref 0.3–6.2)
ERYTHROCYTE [DISTWIDTH] IN BLOOD BY AUTOMATED COUNT: 13.6 % (ref 12.3–15.4)
GLOBULIN UR ELPH-MCNC: 3.6 GM/DL
GLUCOSE BLDC GLUCOMTR-MCNC: 115 MG/DL (ref 70–130)
GLUCOSE SERPL-MCNC: 156 MG/DL (ref 65–99)
HCT VFR BLD AUTO: 40.8 % (ref 37.5–51)
HGB BLD-MCNC: 13.4 G/DL (ref 13–17.7)
IMM GRANULOCYTES # BLD AUTO: 0.02 10*3/MM3 (ref 0–0.05)
IMM GRANULOCYTES NFR BLD AUTO: 0.3 % (ref 0–0.5)
INR PPP: 1.2 (ref 0.89–1.12)
LYMPHOCYTES # BLD AUTO: 1.77 10*3/MM3 (ref 0.7–3.1)
LYMPHOCYTES NFR BLD AUTO: 22.2 % (ref 19.6–45.3)
MCH RBC QN AUTO: 31.5 PG (ref 26.6–33)
MCHC RBC AUTO-ENTMCNC: 32.8 G/DL (ref 31.5–35.7)
MCV RBC AUTO: 96 FL (ref 79–97)
MONOCYTES # BLD AUTO: 0.29 10*3/MM3 (ref 0.1–0.9)
MONOCYTES NFR BLD AUTO: 3.6 % (ref 5–12)
NEUTROPHILS NFR BLD AUTO: 5.79 10*3/MM3 (ref 1.7–7)
NEUTROPHILS NFR BLD AUTO: 72.7 % (ref 42.7–76)
NRBC BLD AUTO-RTO: 0 /100 WBC (ref 0–0.2)
PLATELET # BLD AUTO: 245 10*3/MM3 (ref 140–450)
PMV BLD AUTO: 8.3 FL (ref 6–12)
POTASSIUM SERPL-SCNC: 4.2 MMOL/L (ref 3.5–5.2)
PROT SERPL-MCNC: 7.4 G/DL (ref 6–8.5)
PROTHROMBIN TIME: 15.3 SECONDS (ref 12.2–14.5)
RBC # BLD AUTO: 4.25 10*6/MM3 (ref 4.14–5.8)
SODIUM SERPL-SCNC: 133 MMOL/L (ref 136–145)
WBC NRBC COR # BLD: 7.96 10*3/MM3 (ref 3.4–10.8)

## 2023-09-08 PROCEDURE — 36415 COLL VENOUS BLD VENIPUNCTURE: CPT

## 2023-09-08 PROCEDURE — A9552 F18 FDG: HCPCS | Performed by: NURSE PRACTITIONER

## 2023-09-08 PROCEDURE — 0 FLUDEOXYGLUCOSE F18 SOLUTION: Performed by: NURSE PRACTITIONER

## 2023-09-08 PROCEDURE — 78816 PET IMAGE W/CT FULL BODY: CPT

## 2023-09-08 PROCEDURE — 71250 CT THORAX DX C-: CPT

## 2023-09-08 PROCEDURE — 80053 COMPREHEN METABOLIC PANEL: CPT

## 2023-09-08 PROCEDURE — 93005 ELECTROCARDIOGRAM TRACING: CPT

## 2023-09-08 PROCEDURE — 85730 THROMBOPLASTIN TIME PARTIAL: CPT

## 2023-09-08 PROCEDURE — 82948 REAGENT STRIP/BLOOD GLUCOSE: CPT

## 2023-09-08 PROCEDURE — 85610 PROTHROMBIN TIME: CPT

## 2023-09-08 PROCEDURE — 85025 COMPLETE CBC W/AUTO DIFF WBC: CPT

## 2023-09-08 RX ORDER — FEXOFENADINE HCL 180 MG/1
180 TABLET ORAL AS NEEDED
COMMUNITY

## 2023-09-08 RX ADMIN — FLUDEOXYGLUCOSE F18 1 DOSE: 300 INJECTION INTRAVENOUS at 12:09

## 2023-09-08 NOTE — PAT
An arrival time for procedure was not provided during PAT visit. If patient had any questions or concerns about their arrival time, they were instructed to contact their surgeon/physician.  Additionally, if the patient referred to an arrival time that was acquired from their my chart account, patient was encouraged to verify that time with their surgeon/physician. Arrival times are NOT provided in Pre Admission Testing Department.     Patient denies any current skin issues.      Per Anesthesia Request, patient instructed not to take their ACE/ARB medications on the AM of surgery.     Pt wishes to speak with  and update living will.

## 2023-09-11 ENCOUNTER — TELEPHONE (OUTPATIENT)
Dept: INTERNAL MEDICINE | Facility: CLINIC | Age: 73
End: 2023-09-11

## 2023-09-11 LAB
QT INTERVAL: 414 MS
QTC INTERVAL: 447 MS

## 2023-09-11 NOTE — PROGRESS NOTES
Office Note     Name: David Barfield    : 1950     MRN: [id number]     Chief Complaint  Lung Nodule (Requesting pet scan/ referral to lung surgeon for biopsy. Would like to stay at [facility] )    Subjective     History of Present Illness:  David Barfield is a 73 y.o. male who presents today to discuss getting a PET scan.  Patient follows with Amanda for chronic conditions.  Patient presents today with his significant other to discuss CT findings.  Patient had a low-dose chest CT [facility] which showed a lung RADS 4B, right lower lobe nodule which has changed in size from 1.6 cm to 3.9 cm.  There has been interval enlargement of the mediastinal lymph nodes.  There is also a new narrowing of the right lower lobe bronchus noted.  Patient would like to go ahead and have a PET scan done.  He would also like to have pulmonary consult for possible biopsy with bronchoscopy.  He has been trying to quit smoking.  He is down to 1 pack/day for about the past 2 years.  No acute distress noted during today's visit.  No further complaints or concerns at this time.  Had a pleasant visit with the patient today.      Past Medical History:   Diagnosis Date    Abnormal ECG     Arrhythmia 2019    Asthma 2019    Emphysema, COPD    Atrial fibrillation     Diabetes mellitus Borderline    Emphysema/COPD     Erectile disorder     GERD (gastroesophageal reflux disease)     Hyperlipidemia     Hypertension 2019    Mumps     Mumps     Pruritus     after bath    Slow to wake up after anesthesia     Wears dentures     upper only    Wears hearing aid in both ears     usually only wears right       Past Surgical History:   Procedure Laterality Date    BONE BIOPSY      broken bone surgery in his face    CARDIAC ELECTROPHYSIOLOGY PROCEDURE N/A 2021    Procedure: Pacemaker DC new;  Surgeon: Kayy Box MD;  Location: [facility] INVASIVE LOCATION;  Service: Cardiology;  Laterality: N/A;    FACIAL  "FRACTURE SURGERY      INSERT / REPLACE / REMOVE PACEMAKER  August 17, 2021       Social History     Socioeconomic History    Marital status: Single   Tobacco Use    Smoking status: Every Day     Packs/day: 1.00     Years: 53.00     Pack years: 53.00     Types: Cigarettes     Start date: 1/1/1968    Smokeless tobacco: Never    Tobacco comments:     Still smoke   Vaping Use    Vaping Use: Never used   Substance and Sexual Activity    Alcohol use: Never    Drug use: Never    Sexual activity: Defer     Partners: Female     Birth control/protection: None         Current Outpatient Medications:     fluticasone (VERAMYST) 27.5 MCG/SPRAY nasal spray, 2 sprays into the nostril(s) as directed by provider 1 (One) Time As Needed for Rhinitis or Allergies., Disp: , Rfl:     lisinopril (PRINIVIL,ZESTRIL) 2.5 MG tablet, Take 1 tablet by mouth As Needed (elevated blood pressure). Take 1/2 tablet po prn (Patient taking differently: Take 1 tablet by mouth As Needed (elevated blood pressure systolic over 140). Take 1/2 tablet po prn), Disp: 30 tablet, Rfl: 1    pravastatin (PRAVACHOL) 80 MG tablet, Take 1 tablet by mouth Every Night., Disp: 30 tablet, Rfl: 11    sildenafil (VIAGRA) 100 MG tablet, Take 0.5 tablets by mouth Daily As Needed for Erectile Dysfunction., Disp: , Rfl:     albuterol sulfate  (90 Base) MCG/ACT inhaler, Inhale 2 puffs Every 4 (Four) Hours As Needed for Wheezing., Disp: 18 g, Rfl: 11    fexofenadine (ALLEGRA) 180 MG tablet, Take 1 tablet by mouth As Needed., Disp: , Rfl:     Fluticasone-Umeclidin-Vilant (Trelegy Ellipta) 100-62.5-25 MCG/ACT inhaler, Inhale 1 puff Daily., Disp: 3 each, Rfl: 3    Objective     Vital Signs  /82   Pulse 69   Temp 97.1 °F (36.2 °C)   Ht 182.9 cm (72.01\")   Wt 86.2 kg (190 lb)   SpO2 96%   BMI 25.76 kg/m²   Estimated body mass index is 25.76 kg/m² as calculated from the following:    Height as of this encounter: 182.9 cm (72.01\").    Weight as of this encounter: 86.2 " kg (190 lb).    BMI is >= 25 and <30. (Overweight) The following options were offered after discussion;: Not addressed at this visit      Physical Exam  Constitutional:       Appearance: Normal appearance.   HENT:      Head: Normocephalic and atraumatic.      Nose: Nose normal.   Eyes:      Extraocular Movements: Extraocular movements intact.      Conjunctiva/sclera: Conjunctivae normal.      Pupils: Pupils are equal, round, and reactive to light.   Cardiovascular:      Rate and Rhythm: Normal rate and regular rhythm.   Pulmonary:      Effort: Pulmonary effort is normal. No respiratory distress.      Breath sounds: Normal breath sounds.   Musculoskeletal:         General: Normal range of motion.      Cervical back: Normal range of motion and neck supple.   Skin:     General: Skin is warm and dry.   Neurological:      General: No focal deficit present.      Mental Status: He is alert and oriented to person, place, and time. Mental status is at baseline.   Psychiatric:         Mood and Affect: Mood normal.         Behavior: Behavior normal.         Thought Content: Thought content normal.         Judgment: Judgment normal.        Assessment and Plan     Diagnoses and all orders for this visit:    1. Lung nodule (Primary)  -     NM PET/CT Whole Body; Future  -     Ambulatory Referral to Pulmonology    2. Abnormal chest CT  -     NM PET/CT Whole Body; Future  -     Ambulatory Referral to Pulmonology        Follow Up  Return for Follow up with Amanda.    ANAMIKA Bhat    Part of this note may be an electronic transcription/translation of spoken language to printed text using the Dragon Dictation System.

## 2023-09-11 NOTE — TELEPHONE ENCOUNTER
Caller: ISAÍAS ANDERSON    Relationship: Emergency Contact    Best call back number: [phone]     What form or medical record are you requesting: PET SCAN ON LUNGS    Who is requesting this form or medical record from you: ISAÍAS    How would you like to receive the form or medical records (pick-up, mail, fax):       Timeframe paperwork needed: AS SOON AS POSSIBLE    Additional notes: THEY WOULD LIKE A PAPER COPY OF THE RESULTS PRINTED OUT FOR THEM TO PICK IT UP.

## 2023-09-15 ENCOUNTER — APPOINTMENT (OUTPATIENT)
Dept: GENERAL RADIOLOGY | Facility: HOSPITAL | Age: 73
End: 2023-09-15
Payer: MEDICARE

## 2023-09-15 ENCOUNTER — ANESTHESIA (OUTPATIENT)
Dept: GASTROENTEROLOGY | Facility: HOSPITAL | Age: 73
End: 2023-09-15
Payer: MEDICARE

## 2023-09-15 ENCOUNTER — ANESTHESIA EVENT (OUTPATIENT)
Dept: GASTROENTEROLOGY | Facility: HOSPITAL | Age: 73
End: 2023-09-15
Payer: MEDICARE

## 2023-09-15 ENCOUNTER — HOSPITAL ENCOUNTER (OUTPATIENT)
Facility: HOSPITAL | Age: 73
Setting detail: HOSPITAL OUTPATIENT SURGERY
Discharge: HOME | End: 2023-09-15
Attending: INTERNAL MEDICINE | Admitting: INTERNAL MEDICINE
Payer: MEDICARE

## 2023-09-15 VITALS
TEMPERATURE: 97 F | RESPIRATION RATE: 16 BRPM | HEART RATE: 70 BPM | DIASTOLIC BLOOD PRESSURE: 89 MMHG | SYSTOLIC BLOOD PRESSURE: 152 MMHG | OXYGEN SATURATION: 95 %

## 2023-09-15 DIAGNOSIS — R91.1 NODULE OF LOWER LOBE OF RIGHT LUNG: ICD-10-CM

## 2023-09-15 LAB — GLUCOSE BLDC GLUCOMTR-MCNC: 102 MG/DL (ref 70–130)

## 2023-09-15 PROCEDURE — 71045 X-RAY EXAM CHEST 1 VIEW: CPT

## 2023-09-15 PROCEDURE — 25010000002 FENTANYL CITRATE (PF) 100 MCG/2ML SOLUTION

## 2023-09-15 PROCEDURE — 87205 SMEAR GRAM STAIN: CPT | Performed by: INTERNAL MEDICINE

## 2023-09-15 PROCEDURE — 88305 TISSUE EXAM BY PATHOLOGIST: CPT | Performed by: INTERNAL MEDICINE

## 2023-09-15 PROCEDURE — 88342 IMHCHEM/IMCYTCHM 1ST ANTB: CPT | Performed by: INTERNAL MEDICINE

## 2023-09-15 PROCEDURE — 87116 MYCOBACTERIA CULTURE: CPT | Performed by: INTERNAL MEDICINE

## 2023-09-15 PROCEDURE — 87070 CULTURE OTHR SPECIMN AEROBIC: CPT | Performed by: INTERNAL MEDICINE

## 2023-09-15 PROCEDURE — 87206 SMEAR FLUORESCENT/ACID STAI: CPT | Performed by: INTERNAL MEDICINE

## 2023-09-15 PROCEDURE — 25010000002 PROPOFOL 10 MG/ML EMULSION

## 2023-09-15 PROCEDURE — 88341 IMHCHEM/IMCYTCHM EA ADD ANTB: CPT | Performed by: INTERNAL MEDICINE

## 2023-09-15 PROCEDURE — 82948 REAGENT STRIP/BLOOD GLUCOSE: CPT

## 2023-09-15 PROCEDURE — 25010000002 ONDANSETRON PER 1 MG

## 2023-09-15 PROCEDURE — 76000 FLUOROSCOPY <1 HR PHYS/QHP: CPT

## 2023-09-15 PROCEDURE — 25010000002 DEXAMETHASONE PER 1 MG

## 2023-09-15 PROCEDURE — 25010000002 SUGAMMADEX 200 MG/2ML SOLUTION

## 2023-09-15 PROCEDURE — 87102 FUNGUS ISOLATION CULTURE: CPT | Performed by: INTERNAL MEDICINE

## 2023-09-15 RX ORDER — LIDOCAINE HYDROCHLORIDE 10 MG/ML
INJECTION, SOLUTION EPIDURAL; INFILTRATION; INTRACAUDAL; PERINEURAL AS NEEDED
Status: DISCONTINUED | OUTPATIENT
Start: 2023-09-15 | End: 2023-09-15 | Stop reason: SURG

## 2023-09-15 RX ORDER — ONDANSETRON 2 MG/ML
4 INJECTION INTRAMUSCULAR; INTRAVENOUS ONCE AS NEEDED
Status: DISCONTINUED | OUTPATIENT
Start: 2023-09-15 | End: 2023-09-15 | Stop reason: HOSPADM

## 2023-09-15 RX ORDER — FENTANYL CITRATE 50 UG/ML
INJECTION, SOLUTION INTRAMUSCULAR; INTRAVENOUS AS NEEDED
Status: DISCONTINUED | OUTPATIENT
Start: 2023-09-15 | End: 2023-09-15 | Stop reason: SURG

## 2023-09-15 RX ORDER — ROCURONIUM BROMIDE 10 MG/ML
INJECTION, SOLUTION INTRAVENOUS AS NEEDED
Status: DISCONTINUED | OUTPATIENT
Start: 2023-09-15 | End: 2023-09-15 | Stop reason: SURG

## 2023-09-15 RX ORDER — SODIUM CHLORIDE 9 MG/ML
INJECTION, SOLUTION INTRAVENOUS CONTINUOUS PRN
Status: DISCONTINUED | OUTPATIENT
Start: 2023-09-15 | End: 2023-09-15 | Stop reason: SURG

## 2023-09-15 RX ORDER — DEXAMETHASONE SODIUM PHOSPHATE 4 MG/ML
INJECTION, SOLUTION INTRA-ARTICULAR; INTRALESIONAL; INTRAMUSCULAR; INTRAVENOUS; SOFT TISSUE AS NEEDED
Status: DISCONTINUED | OUTPATIENT
Start: 2023-09-15 | End: 2023-09-15 | Stop reason: SURG

## 2023-09-15 RX ORDER — PROPOFOL 10 MG/ML
VIAL (ML) INTRAVENOUS AS NEEDED
Status: DISCONTINUED | OUTPATIENT
Start: 2023-09-15 | End: 2023-09-15 | Stop reason: SURG

## 2023-09-15 RX ORDER — PHENYLEPHRINE HCL IN 0.9% NACL 1 MG/10 ML
SYRINGE (ML) INTRAVENOUS AS NEEDED
Status: DISCONTINUED | OUTPATIENT
Start: 2023-09-15 | End: 2023-09-15 | Stop reason: SURG

## 2023-09-15 RX ORDER — EPHEDRINE SULFATE 50 MG/ML
INJECTION INTRAVENOUS AS NEEDED
Status: DISCONTINUED | OUTPATIENT
Start: 2023-09-15 | End: 2023-09-15 | Stop reason: SURG

## 2023-09-15 RX ORDER — IPRATROPIUM BROMIDE AND ALBUTEROL SULFATE 2.5; .5 MG/3ML; MG/3ML
3 SOLUTION RESPIRATORY (INHALATION) ONCE AS NEEDED
Status: DISCONTINUED | OUTPATIENT
Start: 2023-09-15 | End: 2023-09-15 | Stop reason: HOSPADM

## 2023-09-15 RX ORDER — ONDANSETRON 2 MG/ML
INJECTION INTRAMUSCULAR; INTRAVENOUS AS NEEDED
Status: DISCONTINUED | OUTPATIENT
Start: 2023-09-15 | End: 2023-09-15 | Stop reason: SURG

## 2023-09-15 RX ADMIN — EPHEDRINE SULFATE 10 MG: 50 INJECTION INTRAVENOUS at 10:36

## 2023-09-15 RX ADMIN — ONDANSETRON 4 MG: 2 INJECTION INTRAMUSCULAR; INTRAVENOUS at 11:06

## 2023-09-15 RX ADMIN — FENTANYL CITRATE 50 MCG: 50 INJECTION, SOLUTION INTRAMUSCULAR; INTRAVENOUS at 09:30

## 2023-09-15 RX ADMIN — Medication 100 MCG: at 10:29

## 2023-09-15 RX ADMIN — LIDOCAINE HYDROCHLORIDE 50 MG: 10 INJECTION, SOLUTION EPIDURAL; INFILTRATION; INTRACAUDAL; PERINEURAL at 09:30

## 2023-09-15 RX ADMIN — EPHEDRINE SULFATE 10 MG: 50 INJECTION INTRAVENOUS at 10:45

## 2023-09-15 RX ADMIN — PROPOFOL 160 MG: 10 INJECTION, EMULSION INTRAVENOUS at 09:30

## 2023-09-15 RX ADMIN — EPHEDRINE SULFATE 10 MG: 50 INJECTION INTRAVENOUS at 10:18

## 2023-09-15 RX ADMIN — FENTANYL CITRATE 50 MCG: 50 INJECTION, SOLUTION INTRAMUSCULAR; INTRAVENOUS at 09:47

## 2023-09-15 RX ADMIN — Medication 100 MCG: at 10:00

## 2023-09-15 RX ADMIN — EPHEDRINE SULFATE 5 MG: 50 INJECTION INTRAVENOUS at 10:02

## 2023-09-15 RX ADMIN — Medication 200 MCG: at 09:57

## 2023-09-15 RX ADMIN — ROCURONIUM BROMIDE 50 MG: 10 SOLUTION INTRAVENOUS at 09:30

## 2023-09-15 RX ADMIN — SUGAMMADEX 200 MG: 100 INJECTION, SOLUTION INTRAVENOUS at 11:10

## 2023-09-15 RX ADMIN — EPHEDRINE SULFATE 5 MG: 50 INJECTION INTRAVENOUS at 09:39

## 2023-09-15 RX ADMIN — Medication 100 MCG: at 09:43

## 2023-09-15 RX ADMIN — Medication 200 MCG: at 09:51

## 2023-09-15 RX ADMIN — LIDOCAINE HYDROCHLORIDE 100 MG: 10 INJECTION, SOLUTION EPIDURAL; INFILTRATION; INTRACAUDAL; PERINEURAL at 10:12

## 2023-09-15 RX ADMIN — SODIUM CHLORIDE: 9 INJECTION, SOLUTION INTRAVENOUS at 08:36

## 2023-09-15 RX ADMIN — ROCURONIUM BROMIDE 10 MG: 10 SOLUTION INTRAVENOUS at 10:21

## 2023-09-15 RX ADMIN — DEXAMETHASONE SODIUM PHOSPHATE 4 MG: 4 INJECTION, SOLUTION INTRA-ARTICULAR; INTRALESIONAL; INTRAMUSCULAR; INTRAVENOUS; SOFT TISSUE at 09:38

## 2023-09-15 NOTE — ANESTHESIA POSTPROCEDURE EVALUATION
Patient: David Barfield    Procedure Summary       Date: 09/15/23 Room / Location:  [facility]    Anesthesia Start: 0926 Anesthesia Stop: 1127    Procedure: BRONCHOSCOPY NAVIGATION WITH ENDOBRONCHIAL ULTRASOUND AND ION ROBOT Diagnosis:       Nodule of lower lobe of right lung      (Nodule of lower lobe of right lung [R91.1])    Surgeons: Octaviano Sampson MD Provider: Napoleon Adan MD    Anesthesia Type: general ASA Status: 3            Anesthesia Type: general    Vitals  Vitals Value Taken Time   /87 09/15/23 1128   Temp 97 °F (36.1 °C) 09/15/23 1128   Pulse 96 09/15/23 1128   Resp 14 09/15/23 1128   SpO2 97 % 09/15/23 1128           Post Anesthesia Care and Evaluation    Patient location during evaluation: PACU  Patient participation: complete - patient participated  Level of consciousness: awake and alert  Pain score: 0  Pain management: adequate    Airway patency: patent  Anesthetic complications: No anesthetic complications  PONV Status: none  Cardiovascular status: hemodynamically stable and acceptable  Respiratory status: nonlabored ventilation, acceptable and nasal cannula  Hydration status: acceptable

## 2023-09-15 NOTE — OP NOTE
Bronchoscopy Procedural Note       Date of Operation: 9/15/2023    Indication: This is a 73 y.o. male found to have a PET avid right lower lobe 2.9 x 1.7 cm lung nodule and borderline lymph nodes bronchoscopy for diagnosis.    Pre-op Diagnosis: Right lower lobe lung nodule and borderline mediastinal adenopathy.    Post-op Diagnosis: Right lower lobe lung nodule and borderline mediastinal adenopathy status post robotic navigational bronchoscopy with biopsies and endobronchial ultrasound-guided biopsies of mediastinal/interlobar lymph nodes.    Surgeon: Octaviano Sampson MD    Referring Physician:Amanda Smith PA      Procedures Performed:  Flexible videobronchoscopy  Bronchial washings  Robotic navigation, fluoroscopic, and radial EBUS directed TBNA of right lower lobe lung nodule.  Robotic navigation, fluoroscopic, and radial EBUS directed transbronchial biopsies of right lower lobe lung nodule.  Robotic navigation, fluoroscopic, and radial EBUS directed endobronchial brushing of right lower lobe lung nodule.  Bronchoalveolar lavage right lower lobe.  Linear endobronchial ultrasound guided TBNA of station 11 L lymph node.  Linear endobronchial ultrasound-guided TBNA of station 4L lymph node.  Linear endobronchial ultrasound guided TBNA of station 7 lymph node.    Bronchial washings.      Anesthesia: General endotracheal, per anesthesia.      Estimated Blood Loss: <5 ml    Description of Procedure:    Informed consent was obtained after the risks and benefits of the procedure, including the risk of bleeding, pneumothorax, medication reaction, and death were described.  A signed informed consent form was placed on the chart prior to the procedure.      Patient was brought to the endoscopy room where he was intubated by anesthesia who monitored the patient throughout the case.    A flexible video bronchoscope was inserted through the existing endotracheal tube and advanced to the distal trachea.  The distal  trachea showed mild chronic inflammation.  Main richi was sharp.  Right bronchial tree was examined to at least the segmental level.  There was mild chronic inflammatory change with a moderate amount of secretions which were suctioned clear.  No discrete endobronchial lesions were seen.    The left bronchial tree was examined to at least the segmental level.  There was mild chronic inflammatory change of the bronchial mucosa with normal bronchial anatomy and mild secretions which were suctioned clear.  There were no discrete endobronchial lesions.  The flexible video bronchoscope was then removed.    I then started the robotic navigation portion of the procedure.  The robotic scope was brought into place and docked to the endotracheal tube and patient.  The scope was advanced to the distal trachea.  An automatic registration process was completed and deemed to be adequate.    I then navigated using the robotic bronchoscope to the preplanned right lower lobe biopsy site.  Once in place, fluoroscopy was brought in to zen position and monitor biopsies.  Radial EBUS was brought in to confirm location which was adjacent to the mass.  The mass was visualized by radial EBUS.  I then did several transbronchial biopsies with a 19-gauge TBNA needle under fluoroscopy at the biopsy site with adequate specimen obtained and no significant bleeding encountered.  Next, I did several transbronchial biopsies at the same robotic navigational directed location using fluoroscopy and radial endobronchial ultrasound for confirmation.  No significant bleeding was encountered.  I then used fluoroscopy to do an endobronchial brushing at that site for cytology.  Again no significant bleeding.  I then did a bronchoalveolar lavage with a total of 160 mL of sterile saline with approximately 25 mL of bloody return.  This will be split for cytology and microbiology.  I then removed the robotic bronchoscope.    I then did a brief airway  inspection with a flexible video bronchoscope and cleaned up some residual secretions.  I then removed the flexible video bronchoscope.    I inserted a linear endobronchial ultrasound scope and did a survey of mediastinal, hilar, and interlobar lymph node stations.  There was significant adenopathy at stations 4L, 7, and 11 L.  I used a 21-gauge TBNA needle and linear endobronchial ultrasound guidance to do multiple passes of a 21-gauge TBNA needle at station 11 L with no significant bleeding.  I then moved to station for L and likewise did multiple passes of a 21-gauge TBNA needle with endobronchial ultrasound guidance.  Finally, I moved to station 7 and did multiple passes of a 21-gauge TBNA needle under endobronchial ultrasound guidance.  There is no significant bleeding with any of these procedures.  I then removed the linear endobronchial ultrasound scope.    I inserted the flexible video bronchoscope and did a brief airway inspection.  I cleaned up some residual secretions and turned the patient over to anesthesia and endoscopy staff for extubation and postprocedure monitoring.    There were no immediate complications.    Findings and Recommendations:  Specimens obtained:  1.  TBNA right lower lobe.  2.  Transbronchial biopsies right lower lobe.  3.  Endobronchial brushing right lower lobe.  4.  Bronchoalveolar lavage right lower lobe.  5.  TBNA station 11 L.  6.  TBNA station 4L.  7.  TBNA station 7.  8.  Bronchial washings.    Patient will follow-up next week with an APRN in the office for preliminary results of biopsy.  I will obtain a postprocedure chest x-ray to evaluate for potential pneumothorax.    Electronically signed by:  Octaviano Sampson MD  Pulmonary and Critical Care Medicine   09/15/23 11:14 EDT

## 2023-09-15 NOTE — H&P
Initial Pulmonary Consult Note    Subjective   Reason for consultation: Lung nodule    David Barfield is a 73 y.o. male is being seen for consultation today at the request of Octaviano Sampson MD    History of Present Illness    73 y.o. male active smoker of 1/2 PPD up to 2 PPD for 50 years with a history of HTN, HL, here for evaluation of a right sided lung nodule.  Had bronchoscopy/EBUS in 2020 at the [facility] that was reportedly benign.  He worked in construction up to age 71.  Had PPM in 2021 by Dr. Box and is followed by Dr. Pop.      Patient has an appointment at the [facility] on September 13th for a procedure but patient/family do not want it done there.      Patient denies dyspnea, hemoptysis, unexplained weight loss, or palpable adenopathy.  He had an episode a few months ago during which time he had worsening dyspnea.  He does not take current inhalers.      The following portions of the patient's history were reviewed and updated as appropriate: allergies, current medications, past family history, past medical history, past social history, past surgical history, and problem list.    Active Ambulatory Problems     Diagnosis Date Noted    High degree atrioventricular block 06/30/2021    Bradycardia, sinus 06/30/2021    Chronic hypotension 06/30/2021    PVC's (premature ventricular contractions) 06/30/2021    Presence of cardiac pacemaker 07/05/2023    Mixed hyperlipidemia 07/05/2023    Nodule of lower lobe of right lung 08/29/2023    Mediastinal adenopathy 08/29/2023    Cough 08/29/2023    History of tobacco use, presenting hazards to health 08/29/2023     Resolved Ambulatory Problems     Diagnosis Date Noted    COPD with asthma 08/29/2023     Past Medical History:   Diagnosis Date    Abnormal ECG     Arrhythmia 2019    Asthma 2019    Atrial fibrillation     Diabetes mellitus Borderline    Emphysema/COPD     Erectile disorder     GERD (gastroesophageal reflux disease)     Hyperlipidemia      Hypertension 2019    Mumps     Mumps     Pruritus     Slow to wake up after anesthesia     Wears dentures     Wears hearing aid in both ears        Past Surgical History:   Procedure Laterality Date    BONE BIOPSY      broken bone surgery in his face    CARDIAC ELECTROPHYSIOLOGY PROCEDURE N/A 08/17/2021    Procedure: Pacemaker DC new;  Surgeon: Kayy Box MD;  Location: [facility] INVAS[facility] LOC[facility];  Service: Cardiology;  Laterality: N/A;    FACIAL FRACTURE SURGERY      INSERT / REPLACE / REMOVE PACEMAKER  August 17, 2021       Family History   Problem Relation Age of Onset    Aneurysm Mother         brain    Dementia Father     Leukemia Sister     Hypertension Paternal Grandfather     Heart disease Paternal Grandmother        Social History     Socioeconomic History    Marital status: Single   Tobacco Use    Smoking status: Every Day     Packs/day: 1.00     Years: 53.00     Pack years: 53.00     Types: Cigarettes     Start date: 1/1/1968    Smokeless tobacco: Never    Tobacco comments:     Still smoke   Vaping Use    Vaping Use: Never used   Substance and Sexual Activity    Alcohol use: Never    Drug use: Never    Sexual activity: Defer     Partners: Female     Birth control/protection: None       No Known Allergies    No current facility-administered medications for this encounter.       Review of Systems  All other systems were reviewed and are negative.  Exceptions are included in the HPI or as otherwise noted above.     Objective   Blood pressure (!) 158/111, pulse 70, temperature 96.9 °F (36.1 °C), temperature source Temporal, SpO2 98 %.  Physical Exam    Physical Exam:     Constitutional:    Alert, cooperative, in no acute distress   Head:    Normocephalic, without obvious abnormality, atraumatic   Eyes:            Lids and lashes normal, conjunctivae and sclerae normal, no   icterus, no pallor, corneas clear, PER   ENMT:   Ears appear intact with no abnormalities noted        Neck:   No  adenopathy, supple, trachea midline, no thyromegaly, no JVD       Lungs/Resp:     Normal effort, symmetric chest rise, no crepitus, mild rales bilaterally, no chest wall tenderness                 Heart/CV:    Regular rhythm and normal rate, normal S1 and S2, no            murmur   Abdomen/GI:     Soft, non-tender, non-distended, normal bowel sounds   :     Deferred   Extremities/MSK:   No clubbing or cyanosis.  No edema.  Normal tone.  No deformities.    Pulses:   Pulses palpable and equal bilaterally   Skin:   No bleeding, bruising or rash   Heme/Lymph:   No cervical or supraclavicular adenopathy.    Neurologic:    Psychiatric:       Moves all extremities with no obvious focal motor deficit.  Cranial nerves 2 - 12 grossly intact   Oriented x 3, normal affect.          The above findings are documentation of my personal physical examination from today.  Electronically signed by:  Octaviano Sampson MD  09/15/23  09:25 EDT      PFTs:  Full pulmonary function testing was done on 8/29/2023.  Please see scanned PFT report for complete details.  FVC was 3.62 L or 78% predicted.  FEV1 was 2.43 L or 72% predicted.  FEV1 FVC ratio 67%.  Postbronchodilator FEV1 2.78 L or 83% predicted, a 14% increase from prebronchodilator.  Maximal voluntary ventilation was 89 L/min or 69% predicted.  Total lung capacity was 6.45 L or 93% predicted.  Residual volume 2.74 L or 100% predicted.  DLCO 91% predicted.    Interpretation: Moderate obstruction with significant response to bronchodilator.  Low maximal voluntary ventilation.  No restriction.  No air trapping or hyperinflation.  Normal DLCO.  No prior study available for comparison.  Study is consistent with stage II COPD with asthmatic component.  I suspect there is some hilar adenopathy.  There is evidence of prior granulomatous disease.    Imaging:  I reviewed CT scans of the chest from August 10, 2023, July 19, 2022, July 20, 2021, January 22, 2020, July 15, 2019, and January 4,  2019.  Patient has a cavitary right lower lobe superior segment lung nodule that appears to have grown from the most recent CT scan and become more dense.  Looking back on prior CT scans this area has had varying densities and appears to have an underlying cystic structure.    Assessment & Plan   Problems Addressed this Visit    None  Diagnoses    None.         Discussion:    73 y.o. male active smoker of 1/2 PPD up to 2 PPD for 50 years with a history of HTN, HL, here for evaluation of a right sided lung nodule.  Had bronchoscopy/EBUS in 2020 at the [facility] that was reportedly benign.  He worked in construction up to age 71.  Had PPM in 2021 by Dr. Box and is followed by Dr. Pop.      Patient has an appointment at the [facility] on September 13th for a procedure but patient/family do not want it done there.      Patient denies dyspnea, hemoptysis, unexplained weight loss, or palpable adenopathy.  He had an episode a few months ago during which time he had worsening dyspnea.  He does not take current inhalers.      Evaluation today shows moderate obstruction on pulmonary function testing with significant response to bronchodilator.  I feel this is consistent with COPD with asthmatic component.  By history, the patient likely had a COPD exacerbation a couple months ago.    Review of imaging shows a right lower lobe superior segment nodule that appears to have grown in density.  This nodule is partially cavitary which likely reflects an underlying partially cystic structure.    Is unclear what is going on with this patient.  I cannot rule out the possibility of malignancy.  A PET CT scan has already been ordered and is scheduled for September 8 and I will review this.  Regardless, given the progression, the patient likely needs a diagnosis.  I am suspicious of an underlying infectious process such as mycobacterial disease or fungal disease infecting a prior postinflammatory cavity.  I will try to get records from  "the patient's prior lymph node biopsy, which he reports were, \"benign\".  Based on the history relayed, the patient does not seem like he received any specific treatment after the prior biopsy in terms of anti-inflammatories and denies any prior history of cancer treatment.    Plan:  1.  For right lower lobe superior segment nodule: Follow-up PET scan.  I will also plan for biopsy attempt using robotic navigational bronchoscopy.  I will obtain a planning CT scan for this.  PET scan will be helpful to aid in a specific lymph node biopsies as I will pay special attention to any positive findings on the PET scan.  I discussed risks, benefits, and alternatives to bronchoscopy including the risk of bleeding, lung damage/pneumothorax, respiratory failure, and death.  The patient is agreeable and we will proceed.  2.  For mediastinal adenopathy: Suspected right hilar and possible mediastinal adenopathy.  I will assess this further with endobronchial ultrasound during bronchoscopy with biopsies if indicated.  3.  For COPD with asthmatic component: Start Trelegy 100.  Albuterol rescue inhaler 2 puffs every 4-6 hours as needed.  4.  For history of tobacco use presenting hazards to health: Screening CT scans.  Smoking cessation counseling done.  5.  For health maintenance: Recommend yearly influenza vaccination.  Recommend pneumonia vaccination.         Immunization History   Administered Date(s) Administered    COVID-19 (PFIZER) BIVALENT 12+YRS 09/16/2022    COVID-19 (PFIZER) Purple Cap Monovalent 01/29/2021, 02/19/2021, 10/18/2021, 04/14/2022       Social History     Tobacco Use   Smoking Status Every Day    Packs/day: 1.00    Years: 53.00    Pack years: 53.00    Types: Cigarettes    Start date: 1/1/1968   Smokeless Tobacco Never   Tobacco Comments    Still smoke        David Barfield  reports that he has been smoking cigarettes. He started smoking about 55 years ago. He has a 53.00 pack-year smoking history. He has never " used smokeless tobacco.. I have educated him on the risk of diseases from using tobacco products such as cancer, COPD, and heart disease.     I advised him to quit and he is willing to quit. We have discussed the following method/s for tobacco cessation:  Counseling.  Together we have set a quit date for 2 weeks from today.  He will follow up with me in 3 months or sooner to check on his progress.    I spent 3  minutes counseling the patient.            Advance Care Planning   ACP discussion was held with the patient during this visit. Patient has an advance directive in EMR which is still valid.  [facility] form given 8/29/2023.        _____________________________________________________________________________    The above note from 8/29/2023 was reviewed.  I reviewed the pertinent information with the patient.  Patient has had no changes in his symptoms.  I reviewed recent imaging and I had the opportunity to plan a robotic navigation procedure and the plan was deemed adequate for procedure.  I again discussed the risks, benefits, and alternatives to robotic navigational bronchoscopy, including the risk of bleeding, lung damage/pneumothorax, respiratory failure, medication reaction, and death.  The patient is agreeable and we will proceed with robotic navigation bronchoscopy with biopsies and endobronchial ultrasound-guided evaluation of mediastinal and hilar lymph nodes with biopsies if indicated.    Electronically signed by:  Octaviano Sampson MD  09/15/23  09:25 EDT    Please note that portions of this note were completed with [employer] - a voice recognition program.

## 2023-09-15 NOTE — ANESTHESIA PREPROCEDURE EVALUATION
Anesthesia Evaluation     Patient summary reviewed and Nursing notes reviewed   NPO Solid Status: > 8 hours  NPO Liquid Status: > 2 hours           Airway   Mallampati: II  TM distance: >3 FB  Neck ROM: full  No difficulty expected  Dental    (+) upper dentures, edentulous and lower dentures    Pulmonary    (+) a smoker Current Abstained day of surgery, COPD (rare  MDI use) moderate, asthma,  (-) shortness of breath, recent URI, sleep apnea    ROS comment: Sats 98% RA  R lung mass /nodule c adenopathy  Cardiovascular     ECG reviewed    (+) pacemaker (Battery life 7 years) pacemaker, hypertension, dysrhythmias Atrial Fib, PVD (Thor Aneurysm 4.2), hyperlipidemia  (-) past MI, angina, cardiac stents    ROS comment: ECG At paced  ECHO 2021 normal c mild LVH     Neuro/Psych  (-) seizures, CVA  GI/Hepatic/Renal/Endo    (+) GERD, diabetes mellitus type 2    Musculoskeletal     Abdominal    Substance History      OB/GYN          Other        ROS/Med Hx Other: Relates he had severe ? Hypotension for similar procedure at    Reviewed their records and no post op complication noted                   Anesthesia Plan    ASA 3     general     intravenous induction     Anesthetic plan, risks, benefits, and alternatives have been provided, discussed and informed consent has been obtained with: patient.    Plan discussed with CRNA.      CODE STATUS:

## 2023-09-15 NOTE — ANESTHESIA PROCEDURE NOTES
Airway  Urgency: elective    Date/Time: 9/15/2023 9:35 AM  Airway not difficult    General Information and Staff    Patient location during procedure: OR  CRNA/CAA: Rissa Rothman CRNA    Indications and Patient Condition  Indications for airway management: airway protection    Preoxygenated: yes  MILS not maintained throughout  Mask difficulty assessment: 1 - vent by mask    Final Airway Details  Final airway type: endotracheal airway      Successful airway: ETT  Cuffed: yes   Successful intubation technique: video laryngoscopy  Facilitating devices/methods: intubating stylet  Endotracheal tube insertion site: oral  Blade: [place]  Blade size: 4  ETT size (mm): 8.5  Cormack-Lehane Classification: grade I - full view of glottis  Placement verified by: chest auscultation and capnometry   Inital cuff pressure (cm H2O): 10  Measured from: lips  ETT/EBT  to lips (cm): 22  Number of attempts at approach: 1  Assessment: lips, teeth, and gum same as pre-op and atraumatic intubation    Additional Comments  Negative epigastric sounds, Breath sound equal bilaterally with symmetric chest rise and fall

## 2023-09-16 LAB
NIGHT BLUE STAIN TISS: NORMAL
NIGHT BLUE STAIN TISS: NORMAL

## 2023-09-17 LAB
BACTERIA SPEC RESP CULT: NO GROWTH
BACTERIA SPEC RESP CULT: NORMAL
GRAM STN SPEC: NORMAL

## 2023-09-18 LAB
GIE STN SPEC: NORMAL
GIE STN SPEC: NORMAL
REF LAB TEST METHOD: NORMAL

## 2023-09-20 LAB
CYTO UR: NORMAL
LAB AP CASE REPORT: NORMAL
LAB AP CLINICAL INFORMATION: NORMAL
LAB AP DIAGNOSIS COMMENT: NORMAL
LAB AP SPECIAL STAINS: NORMAL
PATH REPORT.FINAL DX SPEC: NORMAL
PATH REPORT.GROSS SPEC: NORMAL

## 2023-09-22 ENCOUNTER — OFFICE VISIT (OUTPATIENT)
Dept: PULMONOLOGY | Facility: CLINIC | Age: 73
End: 2023-09-22
Payer: MEDICARE

## 2023-09-22 VITALS
SYSTOLIC BLOOD PRESSURE: 162 MMHG | HEART RATE: 78 BPM | BODY MASS INDEX: 26.06 KG/M2 | RESPIRATION RATE: 18 BRPM | DIASTOLIC BLOOD PRESSURE: 98 MMHG | WEIGHT: 192.38 LBS | HEIGHT: 72 IN | TEMPERATURE: 98.4 F | OXYGEN SATURATION: 97 %

## 2023-09-22 DIAGNOSIS — C34.90 NON-SMALL CELL LUNG CANCER, UNSPECIFIED LATERALITY: Primary | ICD-10-CM

## 2023-09-22 DIAGNOSIS — J44.9 CHRONIC OBSTRUCTIVE PULMONARY DISEASE, UNSPECIFIED COPD TYPE: ICD-10-CM

## 2023-09-22 DIAGNOSIS — Z87.891 HISTORY OF TOBACCO USE, PRESENTING HAZARDS TO HEALTH: ICD-10-CM

## 2023-09-22 DIAGNOSIS — R91.1 NODULE OF LOWER LOBE OF RIGHT LUNG: ICD-10-CM

## 2023-09-22 LAB
FUNGUS WND CULT: NORMAL
FUNGUS WND CULT: NORMAL
MYCOBACTERIUM SPEC CULT: NORMAL
MYCOBACTERIUM SPEC CULT: NORMAL
NIGHT BLUE STAIN TISS: NORMAL
NIGHT BLUE STAIN TISS: NORMAL

## 2023-09-22 PROCEDURE — 1159F MED LIST DOCD IN RCRD: CPT | Performed by: NURSE PRACTITIONER

## 2023-09-22 PROCEDURE — 1160F RVW MEDS BY RX/DR IN RCRD: CPT | Performed by: NURSE PRACTITIONER

## 2023-09-22 PROCEDURE — 99214 OFFICE O/P EST MOD 30 MIN: CPT | Performed by: NURSE PRACTITIONER

## 2023-09-22 NOTE — PROGRESS NOTES
"[facility] Pulmonary Follow up      Chief Complaint  Post Bronchoscopy (F/u)    Subjective          David Barfield presents to [facility] for follow-up after his recent bronchoscopy by Dr. Sampson.  He was being evaluated for a right-sided lung nodule and underwent bronchoscopy with EBUS by Dr. Sampson on September 15.    He said postprocedure he has done well.  He said no worsening cough or sputum production.  No hemoptysis.  No shortness of breath or chest pain.    He does continue to use his Trelegy daily.  He occasionally uses his rescue inhaler.  He is still smoking.      Objective     Vital Signs:   /98 (BP Location: Left arm, Patient Position: Sitting, Cuff Size: Adult)   Pulse 78   Temp 98.4 °F (36.9 °C)   Resp 18   Ht 182.9 cm (72.01\")   Wt 87.3 kg (192 lb 6 oz)   SpO2 97% Comment: room air at rest  BMI 26.08 kg/m²       Immunization History   Administered Date(s) Administered    COVID-19 (PFIZER) BIVALENT 12+YRS 09/16/2022    COVID-19 (PFIZER) Purple Cap Monovalent 01/29/2021, 02/19/2021, 10/18/2021, 04/14/2022       Physical Exam  Vitals and nursing note reviewed.   Constitutional:       General: He is not in acute distress.     Appearance: He is well-developed. He is not ill-appearing.   HENT:      Head: Normocephalic and atraumatic.   Eyes:      Pupils: Pupils are equal, round, and reactive to light.   Cardiovascular:      Rate and Rhythm: Normal rate and regular rhythm.   Pulmonary:      Effort: Pulmonary effort is normal.      Comments: Decreased bilaterally  Abdominal:      General: Bowel sounds are normal.      Palpations: Abdomen is soft.   Musculoskeletal:         General: Normal range of motion.      Cervical back: Normal range of motion and neck supple.   Skin:     General: Skin is warm and dry.   Neurological:      Mental Status: He is alert and oriented to person, place, and time.   Psychiatric:         Behavior: Behavior " normal.        Result Review :       Data reviewed : Pathology results, copy given to patient                      Assessment and Plan    Problem List Items Addressed This Visit          Pulmonary and Pneumonias    COPD (chronic obstructive pulmonary disease)    Nodule of lower lobe of right lung       Tobacco    History of tobacco use, presenting hazards to health     Other Visit Diagnoses       Non-small cell lung cancer, unspecified laterality    -  Primary    Relevant Orders    MRI brain w wo contrast    Ambulatory Referral to Oncology            We reviewed his pathology results today.  The right upper lobe biopsies showed limited sample of bronchial tissue, lymph node station 7 transbronchial biopsy did show non-small cell cancer [place].  Dr. Sampson has seen the results and he does feel like the mass itself was negative due to necrosis.    He has had a PET scan on September 8 that did show a right mass consistent with malignancy and no other metastasis.    We will go ahead and continue his staging with an MRI of the brain.  He will also follow-up with oncology for treatment recommendations.  I will coordinate the above with the oncology nurse navigator, Ms. Hernandez.    Follow Up     Return in about 3 months (around 12/22/2023).  Patient was given instructions and counseling regarding his condition or for health maintenance advice. Please see specific information pulled into the AVS if appropriate.     I spent 35 minutes caring for David on this date of service. This time includes time spent by me in the following activities:preparing for the visit, reviewing tests, obtaining and/or reviewing a separately obtained history, performing a medically appropriate examination and/or evaluation , counseling and educating the patient/family/caregiver, ordering medications, tests, or procedures, referring and communicating with other health care professionals , documenting information in the medical record, and  independently interpreting results and communicating that information with the patient/family/caregiver    Excluding time spent on other separate services such as performing procedures or test interpretation, if applicable        Cyndi Garvin APRN, ACNP  [facility]  Electronically signed

## 2023-09-28 ENCOUNTER — HOSPITAL ENCOUNTER (OUTPATIENT)
Dept: MRI IMAGING | Facility: HOSPITAL | Age: 73
Discharge: HOME | End: 2023-09-28
Admitting: NURSE PRACTITIONER
Payer: MEDICARE

## 2023-09-28 DIAGNOSIS — C34.90 NON-SMALL CELL LUNG CANCER, UNSPECIFIED LATERALITY: ICD-10-CM

## 2023-09-28 PROCEDURE — A9577 INJ MULTIHANCE: HCPCS | Performed by: NURSE PRACTITIONER

## 2023-09-28 PROCEDURE — 0 GADOBENATE DIMEGLUMINE 529 MG/ML SOLUTION: Performed by: NURSE PRACTITIONER

## 2023-09-28 PROCEDURE — 70553 MRI BRAIN STEM W/O & W/DYE: CPT

## 2023-09-28 RX ADMIN — GADOBENATE DIMEGLUMINE 18 ML: 529 INJECTION, SOLUTION INTRAVENOUS at 09:13

## 2023-10-03 ENCOUNTER — TELEPHONE (OUTPATIENT)
Dept: INTERNAL MEDICINE | Facility: CLINIC | Age: 73
End: 2023-10-03
Payer: MEDICARE

## 2023-10-03 NOTE — TELEPHONE ENCOUNTER
Caller: ISAÍAS ANDERSON    Relationship: Emergency Contact    Best call back number: [phone]     What is the medical concern/diagnosis: LUNG CANCER    What specialty or service is being requested: PULMONARY SURGEON    Any additional details: ISAÍAS WOULD LIKE THIS TO BE DONE STAT.    PLEASE CALL BACK WHEN REFERRAL IS PLACED OR IF THEY NEED AN APPOINTMENT.

## 2023-10-04 ENCOUNTER — CONSULT (OUTPATIENT)
Dept: ONCOLOGY | Facility: CLINIC | Age: 73
End: 2023-10-04
Payer: MEDICARE

## 2023-10-04 VITALS
WEIGHT: 191 LBS | BODY MASS INDEX: 25.87 KG/M2 | SYSTOLIC BLOOD PRESSURE: 151 MMHG | TEMPERATURE: 98.2 F | OXYGEN SATURATION: 98 % | RESPIRATION RATE: 18 BRPM | DIASTOLIC BLOOD PRESSURE: 90 MMHG | HEIGHT: 72 IN | HEART RATE: 73 BPM

## 2023-10-04 DIAGNOSIS — C34.91 BRONCHOGENIC CANCER OF RIGHT LUNG: Primary | ICD-10-CM

## 2023-10-04 PROBLEM — C34.31 MALIGNANT NEOPLASM OF LOWER LOBE OF RIGHT LUNG: Status: ACTIVE | Noted: 2023-10-04

## 2023-10-04 NOTE — TELEPHONE ENCOUNTER
It looks like he has an appointment with Oncology today. I will defer to their recommendations. They can order a surgical consult if needed. I will also reach out to Pulmonary to make sure I am clear on what they recommended.

## 2023-10-04 NOTE — LETTER
2023       No Recipients    Patient: David Barfield   YOB: 1950   Date of Visit: 10/4/2023     Dear Octaviano Sampson MD:       Thank you for referring David Barfield to me for evaluation. Below are the relevant portions of my assessment and plan of care.    If you have questions, please do not hesitate to call me. I look forward to following David along with you.         Sincerely,        Neetu Ashley MD        CC:   No Recipients    Neetu Ashley MD  10/09/23 0535  Sign when Signing Visit    [facility] [place]  Office number [phone]    Fax number [phone]     New Patient Office Visit      Date: 10/04/2023     Patient Name: David Barfield  MRN: [id number]  : 1950    Referring Physician: Dr. Sampson    Chief Complaint: Nonsmall cell lung cancer    Cancer Staging:  Cancer Staging   Stage IIIA (cT2b, cN2, cM0)    History of Present Illness: David Barfield is a pleasant 73 y.o. male who presents today for evaluation of NSCLC. He has a 50 pack year smoking history.    He has a history of a PET avid subpleural RLL lung [facility] [place] [facility]. This had an SUV of 9.8 on PET  in association with increased mediastinal LN uptake with SUV around 3. Imaging was felt secondary to osteophyte fibrosis. He did undergo bronchoscopy 2020 with negative cytology. The RLL lung nodule was not felt amenable to bronchoscopy biopsy.    CT lung screen 2023 showed:      PET CT showed mass in the RLL intensely SUV avid, max 17. Mediastinal LN not hypermetabolic above baseline.     Right lower lobe robotic transbronchial biopsy and cytology on 9/15/2023 showed benign findings. Cultures including AFT and fungal were negative.  Station 11L, Station 4L LN negative  Station 7 LN showed NSCLCa (positive for cytokeratin 7, p63, and TTF-1 with no significant staining for p40 or Napsin. The staining pattern raises the possibility of  mixed adenocarcinoma and squamous cell carcinoma differentiation in this tumor). PDL1 negative.    MRI brain was negative.    He is here for an opinion regarding management of newly diagnosed lung cancer.    He has a history of sick sinus syndrome s/p PPM and moderate COPD. He describes only mild physical limitations from this. For example he recently walked 1.5 miles at [facility]. At home, he climbs 17 steps without issue. He does sit down with long activities.     Review of Systems:   I have reviewed the review of systems completed by the patient. This is negative for clinically significant symptoms except as noted below. This document has been scanned into the patient's chart.  Chronic tinnitus, cough, ROBERT, tingling in one hand    Past Medical History:   Past Medical History:   Diagnosis Date    Abnormal ECG     Arrhythmia 2019    Asthma 2019    Emphysema, COPD    Atrial fibrillation     Diabetes mellitus Borderline    Emphysema/COPD     Erectile disorder     GERD (gastroesophageal reflux disease)     Hyperlipidemia     Hypertension 2019    Mumps     Mumps     Pruritus     after bath    Slow to wake up after anesthesia     Wears dentures     upper only    Wears hearing aid in both ears     usually only wears right   No personal history of myocardial infarction, cerebrovascular event, or venous thromboembolism.  No autoimmune diseases.   No prior cscope, mailed cologuard recently awaiting results.      Past Surgical History:   Past Surgical History:   Procedure Laterality Date    BONE BIOPSY      broken bone surgery in his face    BRONCHOSCOPY WITH ION ROBOTIC ASSIST N/A 9/15/2023    Procedure: BRONCHOSCOPY NAVIGATION WITH ENDOBRONCHIAL ULTRASOUND AND ION ROBOT;  Surgeon: Octaviano Sampson MD;  Location: [facility] ENDOSCOPY;  Service: Robotics - Pulmonary;  Laterality: N/A;  ion #6 - 0032  - 0015  Cath guide 0061    EBUS balloon removed and intact    CARDIAC ELECTROPHYSIOLOGY PROCEDURE  N/A 08/17/2021    Procedure: Pacemaker DC new;  Surgeon: Kayy Box MD;  [facility];  Service: Cardiology;  Laterality: N/A;    FACIAL FRACTURE SURGERY      INSERT / REPLACE / REMOVE PACEMAKER  August 17, 2021       Family History:   Family History   Problem Relation Age of Onset    Aneurysm Mother         brain    Dementia Father     Leukemia Sister     Hypertension Paternal Grandfather     Heart disease Paternal Grandmother    Sister leukemia at age 21  No other malignancy    Social History:   Social History     Socioeconomic History    Marital status: Single   Tobacco Use    Smoking status: Every Day     Packs/day: 1.00     Years: 53.00     Pack years: 53.00     Types: Cigarettes     Start date: 1/1/1968    Smokeless tobacco: Never    Tobacco comments:     Still smoke   Vaping Use    Vaping Use: Never used   Substance and Sexual Activity    Alcohol use: Never    Drug use: Never    Sexual activity: Defer     Partners: Female     Birth control/protection: None   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, [place]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    Medications:     Current Outpatient Medications:     albuterol sulfate  (90 Base) MCG/ACT inhaler, Inhale 2 puffs Every 4 (Four) Hours As Needed for Wheezing., Disp: 18 g, Rfl: 11    fexofenadine (ALLEGRA) 180 MG tablet, Take 1 tablet by mouth As Needed., Disp: , Rfl:     fluticasone (VERAMYST) 27.5 MCG/SPRAY nasal spray, 2 sprays into the nostril(s) as directed by provider 1 (One) Time As Needed for Rhinitis or Allergies., Disp: , Rfl:     Fluticasone-Umeclidin-Vilant (Trelegy Ellipta) 100-62.5-25 MCG/ACT inhaler, Inhale 1 puff Daily., Disp: 3 each, Rfl: 3    lisinopril (PRINIVIL,ZESTRIL) 2.5 MG tablet, Take 1 tablet by mouth As Needed (elevated blood pressure). Take 1/2 tablet po prn (Patient taking differently: Take 1 tablet by mouth As Needed (elevated blood pressure systolic over 140). Take 1/2 tablet po prn), Disp: 30 tablet,  Rfl: 1    pravastatin (PRAVACHOL) 80 MG tablet, Take 1 tablet by mouth Every Night., Disp: 30 tablet, Rfl: 11    sildenafil (VIAGRA) 100 MG tablet, Take 0.5 tablets by mouth Daily As Needed for Erectile Dysfunction., Disp: , Rfl:     Allergies:   No Known Allergies    Objective     Vital Signs: There were no vitals filed for this visit. There is no height or weight on file to calculate BMI.   There were no vitals filed for this visit.    ECOG Performance Status: 1 - Symptomatic but completely ambulatory    Physical Exam:   General: No acute distress. Well appearing   HEENT: Normocephalic, atraumatic. Sclera anicteric.   Neck: supple, no adenopathy.   Cardiovascular: regular rate and rhythm. No murmurs.   Respiratory: Normal rate. Clear to auscultation bilaterally  Abdomen: Soft, nontender, non distended with normoactive bowel sounds  Lymph: no cervical, supraclavicular or axillary adenopathy  Neuro: Alert and oriented x 3. No focal deficits.   Ext: Symmetric, no swelling.   Psych: Euthymic    Laboratory/Imaging Reviewed:   No visits with results within 2 Week(s) from this visit.   Latest known visit with results is:   Admission on 09/15/2023, Discharged on 09/15/2023   Component Date Value Ref Range Status    Glucose 09/15/2023 102  70 - 130 mg/dL Final    Case Report 09/15/2023    Final                    Value:Surgical Pathology Report                         Case: [id number]                                  Authorizing Provider:  Octaviano Sampson MD      Collected:           09/15/2023 10:28 AM          Ordering Location:     [facility]   Received:            09/15/2023 12:26 PM                                 ENDO SUITES                                                                  Pathologist:           Ricardo Hoffman MD                                                        Specimens:   1) - Lung, Right Lower Lobe, RLL TBNA for path                                                       2) - Lung, Right Lower Lobe, RLL TBBX for path                                                      3) - Lymph Node, station 11L TBNA for path                                                          4) - Lymph Node, station 4L TBNA for path                                                           5) - Lymph Node, lymph node station 7 TBNA for path                                        Clinical Information 09/15/2023    Final                    Value:This result contains rich text formatting which cannot be displayed here.    Final Diagnosis 09/15/2023    Final                    Value:This result contains rich text formatting which cannot be displayed here.    Comment 09/15/2023    Final                    Value:This result contains rich text formatting which cannot be displayed here.    Gross Description 09/15/2023    Final                    Value:This result contains rich text formatting which cannot be displayed here.    Special Stains 09/15/2023    Final                    Value:This result contains rich text formatting which cannot be displayed here.    Microscopic Description 09/15/2023    Final                    Value:This result contains rich text formatting which cannot be displayed here.    Reference Lab Report 09/15/2023    Final                    Value:[facility]  [address] [place] [address]  Phone: [phone] or [phone]  Fax: [phone]  To Madden M.D., Medical Director    PATIENT NAME                                    LABORATORY NO.  RADU UMANA.                            [id number]  [id number]                                  AGE                  SEX      SSN            CLIENT REF #  [facility]                    73        1950            [id number]    [id number]  [address]                       REQUESTING M.D.         ATTENDING M.D..         [place] [address]                          ZARA BUTLER  DATE COLLECTED          DATE RECEIVED           DATE REPORTED  09/15/2023              09/15/2023              09/18/2023    DIAGNOSIS:  A.       BRONCH BRUSH, RIGHT LOWER LOBE:  Negative for malignant cells.  B.       BRONCHIAL LAVAGE, RIGHT LOWER LOBE:  Negative for malignant cells.  C.       BRONCH WASH, RIGHT                           LOWER LOBE:  Negative for malignant cells.    MICROSCOPIC DESCRIPTION:  A.     Bronchial cells are present.  B.     Ciliated respiratory epithelial cells and pulmonary macrophages.  C.     Ciliated respiratory epithelial cells and pulmonary macrophages.    Professional interpretation rendered by Kurt Sultana M.D., RASANoraP. at [facility], [address] [place] [address].    CLINICAL HISTORY:  Nodule of lower lobe of right lung      SPECIMENS SUBMITTED:  A.    BRONCH BRUSH , RIGHT LOWER LOBE  B.    BRONCHIAL LAVAGE , RIGHT LOWER LOBE  C.    BRONCH WASH , RIGHT LOWER LOBE    GROSS SPECIMEN DESCRIPTION:  A.     30ccs of clear colorless fluid with one bronch brush without visible  sediment received in fixative  B.     40ccs of clear colorless fluid with visible sediment received in fixative  C.     45ccs of cloudy dark pink fluid with visible mucoid sediment received in  fixative  Cell block has been examined.    CYTOTECHNOLOGIST:         NICO SHEPHERD (ASCP)                                                 REVIEWED, DIAGNOSED AND ELECTRONICALLY  SIGNED BY:    Kurt Sultana M.D., F.C.A.P.  CPT CODES:  88112x3, 25705      Fungus Culture 09/15/2023 No fungus isolated at 2 weeks   Preliminary    AFB Culture 09/15/2023 No AFB isolated at 2 weeks   Preliminary    AFB Stain 09/15/2023 No acid fast bacilli seen on concentrated smear   Preliminary    Fungal Stain 09/15/2023 No fungal elements seen   Final    Respiratory Culture 09/15/2023 No growth   Final    Gram Stain 09/15/2023 Rare (1+) WBCs seen   Final    Gram Stain 09/15/2023 Rare (1+) Red  blood cells   Final    Gram Stain 09/15/2023 No organisms seen   Final    Fungus Culture 09/15/2023 No fungus isolated at 2 weeks   Preliminary    AFB Culture 09/15/2023 No AFB isolated at 2 weeks   Preliminary    AFB Stain 09/15/2023 No acid fast bacilli seen on concentrated smear   Preliminary    Fungal Stain 09/15/2023 No fungal elements seen   Final    Respiratory Culture 09/15/2023 Rare Normal respiratory lemuel. No S. aureus or Pseudomonas aeruginosa detected. Final report.   Final    Gram Stain 09/15/2023 Rare (1+) WBCs seen   Final    Gram Stain 09/15/2023 Moderate (3+) Red blood cells   Final    Gram Stain 09/15/2023 No organisms seen   Final       CT Chest Without Contrast Diagnostic    Result Date: 9/8/2023  Narrative: CT CHEST WO CONTRAST DIAGNOSTIC Date of Exam: 9/8/2023 8:58 AM EDT Indication: Lung nodule, > 8mm. Comparison: 8/10/2023 outside study performed in [place] . Technique: Axial CT images were obtained of the chest without contrast administration.  Reconstructed coronal and sagittal images were also obtained. Automated exposure control and iterative construction methods were used. Findings: There is a 2.9 x 1.7 cm spiculated mass in the posterior medial aspect of the superior segment of the right lower lobe. There is some central cavitation again noted. This lesion is highly suspicious for primary lung cancer. There is scarring in the lung apices. Upper lung predominant moderate pulmonary emphysematous change is noted. Scattered calcified granulomatous changes are present. There is a 5 mm noncalcified nodule in the left lower lobe on image #73 which appears unchanged. Additional subpleural  micronodules are noted. Central airways are grossly patent. Visualized thyroid is grossly unremarkable. Mildly prominent right paratracheal lymph node measures 1.1 cm in short axis dimension. Subcarinal lymph node measures 2.3 x 1.4 cm. These may be reactive or neoplastic. PET scan may be of  value to determine malignant potential for staging. Ascending aorta is ectatic measuring 3.8 cm. Extensive coronary and other vascular calcification is noted. Multilead right subclavian cardiac stimulator device  is noted. Limited imaging through the upper abdomen demonstrates no acute abnormality. There is a small hiatal hernia. No aggressive osseous lesions are identified.     Impression: Impression: 1. There is a 2.9 x 1.7 cm spiculated mass in the posterior medial aspect of the superior segment of the right lower lobe with some partial central cavitation. This is highly suspicious for primary lung cancer. There are enlarged lymph nodes in the subcarinal region and right paratracheal region which may be metastatic or reactive. PET scan may be of value to determine malignant potential and staging. 2. Additional subcentimeter pulmonary nodules measuring up to 5 mm. Attention on follow-up recommended. 3. Additional findings as given above. Electronically Signed: Michael Whipple MD  9/8/2023 11:32 AM EDT  Workstation ID: [id number]    MRI Brain With & Without Contrast    Result Date: 9/28/2023  Narrative: MRI BRAIN W WO CONTRAST Date of Exam: 9/28/2023 8:22 AM EDT Indication: Non-small cell lung cancer (NSCLC), staging.  Comparison: None available. Technique:  Routine multiplanar/multisequence sequence images of the brain were obtained before and after the uneventful administration of 18 mL Multihance. Findings: No acute infarct is present on diffusion weighted sequences. Midline structures are normal and the craniocervical junction appears satisfactory. Mild age-related changes are present with some generalized volume loss and few areas of subcortical white matter T2 hyperintensity, likely to represent typical sequela of microvascular ischemic change. There is otherwise no evidence of intracranial hemorrhage, mass or mass effect. There is no abnormal brain parenchymal enhancement. The ventricles are normal in size and  configuration, accounting for surrounding volume loss. The orbits are normal. The paranasal sinuses are clear.     Impression: Impression: No evidence of intracranial metastatic disease. Mild to moderate typical age-related changes are present as above. There is otherwise no evidence of acute ischemia, hemorrhage, mass or abnormal enhancement. Electronically Signed: Alexandre Matos MD  9/28/2023 12:40 PM EDT  Workstation ID: [id number]    XR Chest 1 View    Result Date: 9/15/2023  Narrative: XR CHEST 1 VW COMPARISON: Chest x-ray 10/21/2022, chest CT on 9/8/2023 HISTORY: s/p bronch FINDINGS: Technical adequacy: Adequate Central airways: Unremarkable Heart: Unremarkable. Right subclavian route dual chamber pacer wires noted and unchanged. Great vessels: Unremarkable Mediastinum: Unremarkable Lungs: Opacity in the lower right lung zone noted again. This has been previously seen on the CT. Pulmonary donald:Unremarkable Pleura: Unremarkable Skeletal structures: Degenerative changes Extra thoracic soft tissues:Unremarkable Additional comments: None     Impression: Impression: Noted again is opacity in the right lung lower zone. No acute abnormalities on this exam. Electronically Signed: Kip Gutiérrez MD  9/15/2023 11:50 AM EDT  Workstation ID: [id number]    FL C Arm During Surgery    Result Date: 9/15/2023  Narrative: This procedure was auto-finalized with no dictation required.    NM PET/CT Whole Body    Result Date: 9/8/2023  Narrative: NM PET/CT WHOLE BODY Date of Exam: 9/8/2023 11:45 AM EDT Indication: Lung nodule, < 6mm, high cancer risk, stable on prior exam. Comparison: CT chest of 9/8/2023. Technique: The patient was injected with 10.79 mCi of F-18-FDG. Whole body PET/CT imaging was performed. Correlating noncontrast CT images were obtained as well as fusion images. Serum glucose the day of the exam was 115 mg/DL. Findings: Reference values: Mediastinum, SUV max 1.73. Liver, SUV max 2.58. The previously described mass  of the superior segment of the right lower lobe is intensely hypermetabolic, demonstrating SUV max of 17.22. Previously noted mediastinal nodes are not hypermetabolic above baseline. There is otherwise normal distribution of  radionuclide in the neck, chest, abdomen, and pelvis.     Impression: Impression: Mass in the superior segment of the right lower lobe is compatible with malignancy. No findings suspicious for metastatic disease. Electronically Signed: Elsa Schmid MD  9/8/2023 2:10 PM EDT  Workstation ID: [id number]     Procedures    Bronch 6/2020  Findings:       An EBUS bronchoscope was advanced through the endotracheal tube under        general anesthesia. A comprehensive EBUS survey of all available lymph        node stations revealed lymphadenopathy with benign sonographic features        in stations 11L (9 mm), 4L (8 mm), 7 (15 mm), 4R (11 mm), and 11R (10        mm). EBUS-TBNA x 4 passes (at least) was done at each of those stations        in that sequence with the Olympus ViziShot 21G and 22G needles.       We then withdrew the EBUS scope and inserted a therapeutic scope into        the airway. A thorough airway exam to the first subsegmental level was        unremarkable without endobronchial lesions or secretions. A BAL was        obtained from the right lower lobe superior segment (RB6) (90 ml        instilled, 18 ml of cloudy fluid returned). Adequate hemostasis was        confirmed prior to withdrawing the scope. Patient tolerated procedure.  Impression:           Abnormal CT scan of chest                        1. Successful endobronchial ultrasound bronchoscopy                         with fine needle aspiration.                        2. Mediastinal/hilar lymphadenopathy with benign                         sonographic features in stations 11L, 4L, 7, 4R and 11R.                        3. EBUS-TBNA samples from stations 11L, 4L, 7, 4R and                         11R.                        4.  Normal airway anatomy without active bleeding,                         endobronchial lesions or secretions.                        5. BAL from RLL     11R Lymph Node, (EBUS) Fine Needle Aspiration:  - Negative for malignancy  - Abundant lymphoid tissue and histiocytes with anthracotic pigments.     4R Lymph Node, (EBUS) Fine Needle Aspiration:  - Negative for malignancy.  - Abundant lymphoid tissue present.     Subcarinal Lymph Node, (EBUS) Fine Needle Aspiration:  - Negative for malignancy  - Abundant lymphoid tissue present.     4L Lymph Node, (EBUS) Fine Needle Aspiration:  - Negative for malignancy  - Abundant lymphoid tissue.     11L Lymph Node, (EBUS) Fine Needle Aspiration:  - Negative for malignancy.  - Abundant lymphoid tissue and histiocytes with anthracotic pigments.     Bronchoalveolar Lavage, RLL (ThinPrep only):  NO MALIGNANT CELLS IDENTIFIED  Benign respiratory epithelial cells and pulmonary macrophages       Microbiology Results     Date and Time Order Name Sensitivity Status Organisms Specimen ID Source     6/29/2020 1030 Fungus Culture and Smear   Preliminary   [id number] Lung     6/29/2020 1030 BAL/Brush Culture plus Stain, Semiquant.   Final   [id number] Lung     6/29/2020 1030 Acid Fast Culture Plus Stain   Preliminary   [id number] Lung     Assessment / Plan      Assessment/Plan:     Nonsmall cell lung cancer of the RLL, Stage IIIA  I reviewed the patient's history, current and prior imaging, extensive prior outside records including prior pathology, current pathology and bronchoscopy reports.     He has an enlarging RLL nodule with SUV of 17. Despite negative transbronchial biopsy, he was found to have N2 disease with a positive level 7 LN. We discussed options for definitive treatment to include induction chemo immunotherapy followed by surgical resection, chemoradiation followed by surgical resection, or definitive chemoradiation. We discussed differences in schedules and side effects. He has no  contraindications to immunotherapy and I would consider nivolumab + chemotherapy induction if CT agrees for surgery after neoadjuvant induction therapy    I am going to present his case at thoracic tumor board next week as well as refer him to CTS and radiation oncology for an opinion. I will also send tempus testing to assess for targetable mutations. We will go ahead and facilitate port placement.    -     Ambulatory Referral to Cardiothoracic Surgery  -     IR Port Placement; Future  -     Ambulatory Referral to Radiation Oncology    RTC 1 week for further discussion and treatment planning following tumor board input       Follow Up:   1 week     Neetu Ashley MD  Hematology and Oncology     Time spent on the day of service was 60 minutes inclusive of time before, during, and after office visit on record review, medically appropriate history and physical, counseling patient, ordering tests, documenting in the medical record.

## 2023-10-04 NOTE — PROGRESS NOTES
Hematology and Oncology [place]  Office number [phone]    Fax number [phone]     New Patient Office Visit      Date: 10/04/2023     Patient Name: David Barfield  MRN: [id number]  : 1950    Referring Physician: Dr. Sampson    Chief Complaint: Nonsmall cell lung cancer    Cancer Staging:  Cancer Staging   Stage IIIA (cT2b, cN2, cM0)    History of Present Illness: David Barfield is a pleasant 73 y.o. male who presents today for evaluation of NSCLC. He has a 50 pack year smoking history.    He has a history of a PET avid subpleural RLL lung [facility] [place] [facility]. This had an SUV of 9.8 on PET  in association with increased mediastinal LN uptake with SUV around 3. Imaging was felt secondary to osteophyte fibrosis. He did undergo bronchoscopy 2020 with negative cytology. The RLL lung nodule was not felt amenable to bronchoscopy biopsy.    CT lung screen 2023 showed:      PET CT showed mass in the RLL intensely SUV avid, max 17. Mediastinal LN not hypermetabolic above baseline.     Right lower lobe robotic transbronchial biopsy and cytology on 9/15/2023 showed benign findings. Cultures including AFT and fungal were negative.  Station 11L, Station 4L LN negative  Station 7 LN showed NSCLCa (positive for cytokeratin 7, p63, and TTF-1 with no significant staining for p40 or Napsin. The staining pattern raises the possibility of mixed adenocarcinoma and squamous cell carcinoma differentiation in this tumor). PDL1 negative.    MRI brain was negative.    He is here for an opinion regarding management of newly diagnosed lung cancer.    He has a history of sick sinus syndrome s/p PPM and moderate COPD. He describes only mild physical limitations from this. For example he recently walked 1.5 miles at [facility]. At home, he climbs 17 steps without issue. He does sit down with long activities.     Review of Systems:   I have reviewed the review of  systems completed by the patient. This is negative for clinically significant symptoms except as noted below. This document has been scanned into the patient's chart.  Chronic tinnitus, cough, ROBERT, tingling in one hand    Past Medical History:   Past Medical History:   Diagnosis Date    Abnormal ECG     Arrhythmia 2019    Asthma 2019    Emphysema, COPD    Atrial fibrillation     Diabetes mellitus Borderline    Emphysema/COPD     Erectile disorder     GERD (gastroesophageal reflux disease)     Hyperlipidemia     Hypertension 2019    Mumps     Mumps     Pruritus     after bath    Slow to wake up after anesthesia     Wears dentures     upper only    Wears hearing aid in both ears     usually only wears right   No personal history of myocardial infarction, cerebrovascular event, or venous thromboembolism.  No autoimmune diseases.   No prior cscope, mailed cologuard recently awaiting results.      Past Surgical History:   Past Surgical History:   Procedure Laterality Date    BONE BIOPSY      broken bone surgery in his face    BRONCHOSCOPY WITH ION ROBOTIC ASSIST N/A 9/15/2023    Procedure: BRONCHOSCOPY NAVIGATION WITH ENDOBRONCHIAL ULTRASOUND AND ION ROBOT;  Surgeon: Octaviano Sampson MD;  Location:  [facility];  Service: Robotics - Pulmonary;  Laterality: N/A;  ion #6 - 0032  - 0015  Cath guide 0061    EBUS balloon removed and intact    CARDIAC ELECTROPHYSIOLOGY PROCEDURE N/A 08/17/2021    Procedure: Pacemaker DC new;  Surgeon: Kayy Box MD;  Location:  [facility];  Service: Cardiology;  Laterality: N/A;    FACIAL FRACTURE SURGERY      INSERT / REPLACE / REMOVE PACEMAKER  August 17, 2021       Family History:   Family History   Problem Relation Age of Onset    Aneurysm Mother         brain    Dementia Father     Leukemia Sister     Hypertension Paternal Grandfather     Heart disease Paternal Grandmother    Sister leukemia at age 21  No other malignancy    Social History:   Social  History     Socioeconomic History    Marital status: Single   Tobacco Use    Smoking status: Every Day     Packs/day: 1.00     Years: 53.00     Pack years: 53.00     Types: Cigarettes     Start date: 1/1/1968    Smokeless tobacco: Never    Tobacco comments:     Still smoke   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[place], [place]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    Medications:     Current Outpatient Medications:     albuterol sulfate  (90 Base) MCG/ACT inhaler, Inhale 2 puffs Every 4 (Four) Hours As Needed for Wheezing., Disp: 18 g, Rfl: 11    fexofenadine (ALLEGRA) 180 MG tablet, Take 1 tablet by mouth As Needed., Disp: , Rfl:     fluticasone (VERAMYST) 27.5 MCG/SPRAY nasal spray, 2 sprays into the nostril(s) as directed by provider 1 (One) Time As Needed for Rhinitis or Allergies., Disp: , Rfl:     Fluticasone-Umeclidin-Vilant (Trelegy Ellipta) 100-62.5-25 MCG/ACT inhaler, Inhale 1 puff Daily., Disp: 3 each, Rfl: 3    lisinopril (PRINIVIL,ZESTRIL) 2.5 MG tablet, Take 1 tablet by mouth As Needed (elevated blood pressure). Take 1/2 tablet po prn (Patient taking differently: Take 1 tablet by mouth As Needed (elevated blood pressure systolic over 140). Take 1/2 tablet po prn), Disp: 30 tablet, Rfl: 1    pravastatin (PRAVACHOL) 80 MG tablet, Take 1 tablet by mouth Every Night., Disp: 30 tablet, Rfl: 11    sildenafil (VIAGRA) 100 MG tablet, Take 0.5 tablets by mouth Daily As Needed for Erectile Dysfunction., Disp: , Rfl:     Allergies:   No Known Allergies    Objective     Vital Signs: There were no vitals filed for this visit. There is no height or weight on file to calculate BMI.   There were no vitals filed for this visit.    ECOG Performance Status: 1 - Symptomatic but completely ambulatory    Physical Exam:   General: No acute distress. Well appearing    HEENT: Normocephalic, atraumatic. Sclera anicteric.   Neck: supple, no adenopathy.   Cardiovascular: regular rate and rhythm. No murmurs.   Respiratory: Normal rate. Clear to auscultation bilaterally  Abdomen: Soft, nontender, non distended with normoactive bowel sounds  Lymph: no cervical, supraclavicular or axillary adenopathy  Neuro: Alert and oriented x 3. No focal deficits.   Ext: Symmetric, no swelling.   Psych: Euthymic    Laboratory/Imaging Reviewed:   No visits with results within 2 Week(s) from this visit.   Latest known visit with results is:   Admission on 09/15/2023, Discharged on 09/15/2023   Component Date Value Ref Range Status    Glucose 09/15/2023 102  70 - 130 mg/dL Final    Case Report 09/15/2023    Final                    Value:Surgical Pathology Report                         Case: [id number]                                  Authorizing Provider:  Octaviano Sampson MD      Collected:           09/15/2023 10:28 AM          Ordering Location:     [facility]   Received:            09/15/2023 12:26 PM                                 ENDO SUITES                                                                  Pathologist:           Ricardo Hoffman MD                                                        Specimens:   1) - Lung, Right Lower Lobe, RLL TBNA for path                                                      2) - Lung, Right Lower Lobe, RLL TBBX for path                                                      3) - Lymph Node, station 11L TBNA for path                                                          4) - Lymph Node, station 4L TBNA for path                                                           5) - Lymph Node, lymph node station 7 TBNA for path                                        Clinical Information 09/15/2023    Final                    Value:This result contains rich text formatting which cannot be displayed here.    Final Diagnosis 09/15/2023    Final                     Value:This result contains rich text formatting which cannot be displayed here.    Comment 09/15/2023    Final                    Value:This result contains rich text formatting which cannot be displayed here.    Gross Description 09/15/2023    Final                    Value:This result contains rich text formatting which cannot be displayed here.    Special Stains 09/15/2023    Final                    Value:This result contains rich text formatting which cannot be displayed here.    Microscopic Description 09/15/2023    Final                    Value:This result contains rich text formatting which cannot be displayed here.    Reference Lab Report 09/15/2023    Final                    Value:[facility]  [address] [place] [address]  Phone: [phone] or [phone]  Fax: [phone]  To Madden M.D., Medical Director    PATIENT NAME                                    LABORATORY NO.  RADU UMANA.                            [id number]  [id number]                                  AGE                  SEX      SSN            CLIENT REF #  [facility]                    73        1950            [id number]    [id number]  [address]                       REQUESTING M.D.         ATTENDING MCHLOÉ.         [place] [address]                         BUTLERZARA  DATE COLLECTED          DATE RECEIVED           DATE REPORTED  09/15/2023              09/15/2023              2023    DIAGNOSIS:  A.       BRONCH BRUSH, RIGHT LOWER LOBE:  Negative for malignant cells.  B.       BRONCHIAL LAVAGE, RIGHT LOWER LOBE:  Negative for malignant cells.  C.       BRONCH WASH, RIGHT                           LOWER LOBE:  Negative for malignant cells.    MICROSCOPIC DESCRIPTION:  A.     Bronchial cells are present.  B.     Ciliated respiratory epithelial cells and pulmonary macrophages.  C.     Ciliated respiratory  epithelial cells and pulmonary macrophages.    Professional interpretation rendered by Kurt Sultana M.D., AMADA at [facility] [employer], [address] [place] [address].    CLINICAL HISTORY:  Nodule of lower lobe of right lung      SPECIMENS SUBMITTED:  A.    BRONCH BRUSH , RIGHT LOWER LOBE  B.    BRONCHIAL LAVAGE , RIGHT LOWER LOBE  C.    BRONCH WASH , RIGHT LOWER LOBE    GROSS SPECIMEN DESCRIPTION:  A.     30ccs of clear colorless fluid with one bronch brush without visible  sediment received in fixative  B.     40ccs of clear colorless fluid with visible sediment received in fixative  C.     45ccs of cloudy dark pink fluid with visible mucoid sediment received in  fixative  Cell block has been examined.    CYTOTECHNOLOGIST:         NICO SHEPHERD (ASCP)                                                 REVIEWED, DIAGNOSED AND ELECTRONICALLY  SIGNED BY:    Kurt Sultana M.D., SHANNAN.  CPT CODES:  88112x3, 86248      Fungus Culture 09/15/2023 No fungus isolated at 2 weeks   Preliminary    AFB Culture 09/15/2023 No AFB isolated at 2 weeks   Preliminary    AFB Stain 09/15/2023 No acid fast bacilli seen on concentrated smear   Preliminary    Fungal Stain 09/15/2023 No fungal elements seen   Final    Respiratory Culture 09/15/2023 No growth   Final    Gram Stain 09/15/2023 Rare (1+) WBCs seen   Final    Gram Stain 09/15/2023 Rare (1+) Red blood cells   Final    Gram Stain 09/15/2023 No organisms seen   Final    Fungus Culture 09/15/2023 No fungus isolated at 2 weeks   Preliminary    AFB Culture 09/15/2023 No AFB isolated at 2 weeks   Preliminary    AFB Stain 09/15/2023 No acid fast bacilli seen on concentrated smear   Preliminary    Fungal Stain 09/15/2023 No fungal elements seen   Final    Respiratory Culture 09/15/2023 Rare Normal respiratory lemuel. No S. aureus or Pseudomonas aeruginosa detected. Final report.   Final    Gram Stain 09/15/2023 Rare (1+) WBCs seen   Final    Gram Stain 09/15/2023 Moderate (3+) Red  blood cells   Final    Gram Stain 09/15/2023 No organisms seen   Final       CT Chest Without Contrast Diagnostic    Result Date: 9/8/2023  Narrative: CT CHEST WO CONTRAST DIAGNOSTIC Date of Exam: 9/8/2023 8:58 AM EDT Indication: Lung nodule, > 8mm. Comparison: 8/10/2023 outside study performed in [place] . Technique: Axial CT images were obtained of the chest without contrast administration.  Reconstructed coronal and sagittal images were also obtained. Automated exposure control and iterative construction methods were used. Findings: There is a 2.9 x 1.7 cm spiculated mass in the posterior medial aspect of the superior segment of the right lower lobe. There is some central cavitation again noted. This lesion is highly suspicious for primary lung cancer. There is scarring in the lung apices. Upper lung predominant moderate pulmonary emphysematous change is noted. Scattered calcified granulomatous changes are present. There is a 5 mm noncalcified nodule in the left lower lobe on image #73 which appears unchanged. Additional subpleural  micronodules are noted. Central airways are grossly patent. Visualized thyroid is grossly unremarkable. Mildly prominent right paratracheal lymph node measures 1.1 cm in short axis dimension. Subcarinal lymph node measures 2.3 x 1.4 cm. These may be reactive or neoplastic. PET scan may be of value to determine malignant potential for staging. Ascending aorta is ectatic measuring 3.8 cm. Extensive coronary and other vascular calcification is noted. Multilead right subclavian cardiac stimulator device  is noted. Limited imaging through the upper abdomen demonstrates no acute abnormality. There is a small hiatal hernia. No aggressive osseous lesions are identified.     Impression: Impression: 1. There is a 2.9 x 1.7 cm spiculated mass in the posterior medial aspect of the superior segment of the right lower lobe with some partial central cavitation. This is highly suspicious for  primary lung cancer. There are enlarged lymph nodes in the subcarinal region and right paratracheal region which may be metastatic or reactive. PET scan may be of value to determine malignant potential and staging. 2. Additional subcentimeter pulmonary nodules measuring up to 5 mm. Attention on follow-up recommended. 3. Additional findings as given above. Electronically Signed: Michael Whipple MD  9/8/2023 11:32 AM EDT  Workstation ID: [id number]    MRI Brain With & Without Contrast    Result Date: 9/28/2023  Narrative: MRI BRAIN W WO CONTRAST Date of Exam: 9/28/2023 8:22 AM EDT Indication: Non-small cell lung cancer (NSCLC), staging.  Comparison: None available. Technique:  Routine multiplanar/multisequence sequence images of the brain were obtained before and after the uneventful administration of 18 mL Multihance. Findings: No acute infarct is present on diffusion weighted sequences. Midline structures are normal and the craniocervical junction appears satisfactory. Mild age-related changes are present with some generalized volume loss and few areas of subcortical white matter T2 hyperintensity, likely to represent typical sequela of microvascular ischemic change. There is otherwise no evidence of intracranial hemorrhage, mass or mass effect. There is no abnormal brain parenchymal enhancement. The ventricles are normal in size and configuration, accounting for surrounding volume loss. The orbits are normal. The paranasal sinuses are clear.     Impression: Impression: No evidence of intracranial metastatic disease. Mild to moderate typical age-related changes are present as above. There is otherwise no evidence of acute ischemia, hemorrhage, mass or abnormal enhancement. Electronically Signed: Alexandre Matos MD  9/28/2023 12:40 PM EDT  Workstation ID: [id number]    XR Chest 1 View    Result Date: 9/15/2023  Narrative: XR CHEST 1 VW COMPARISON: Chest x-ray 10/21/2022, chest CT on 9/8/2023 HISTORY: s/p bronch FINDINGS:  Technical adequacy: Adequate Central airways: Unremarkable Heart: Unremarkable. Right subclavian route dual chamber pacer wires noted and unchanged. Great vessels: Unremarkable Mediastinum: Unremarkable Lungs: Opacity in the lower right lung zone noted again. This has been previously seen on the CT. Pulmonary donald:Unremarkable Pleura: Unremarkable Skeletal structures: Degenerative changes Extra thoracic soft tissues:Unremarkable Additional comments: None     Impression: Impression: Noted again is opacity in the right lung lower zone. No acute abnormalities on this exam. Electronically Signed: Kip Gutiérrez MD  9/15/2023 11:50 AM EDT  Workstation ID: [id number]    FL C Arm During Surgery    Result Date: 9/15/2023  Narrative: This procedure was auto-finalized with no dictation required.    NM PET/CT Whole Body    Result Date: 9/8/2023  Narrative: NM PET/CT WHOLE BODY Date of Exam: 9/8/2023 11:45 AM EDT Indication: Lung nodule, < 6mm, high cancer risk, stable on prior exam. Comparison: CT chest of 9/8/2023. Technique: The patient was injected with 10.79 mCi of F-18-FDG. Whole body PET/CT imaging was performed. Correlating noncontrast CT images were obtained as well as fusion images. Serum glucose the day of the exam was 115 mg/DL. Findings: Reference values: Mediastinum, SUV max 1.73. Liver, SUV max 2.58. The previously described mass of the superior segment of the right lower lobe is intensely hypermetabolic, demonstrating SUV max of 17.22. Previously noted mediastinal nodes are not hypermetabolic above baseline. There is otherwise normal distribution of  radionuclide in the neck, chest, abdomen, and pelvis.     Impression: Impression: Mass in the superior segment of the right lower lobe is compatible with malignancy. No findings suspicious for metastatic disease. Electronically Signed: Elsa Schmid MD  9/8/2023 2:10 PM EDT  Workstation ID: [id number]     Procedures    Bronch 6/2020  Findings:       An EBUS  bronchoscope was advanced through the endotracheal tube under        general anesthesia. A comprehensive EBUS survey of all available lymph        node stations revealed lymphadenopathy with benign sonographic features        in stations 11L (9 mm), 4L (8 mm), 7 (15 mm), 4R (11 mm), and 11R (10        mm). EBUS-TBNA x 4 passes (at least) was done at each of those stations        in that sequence with the Olympus ViziShot 21G and 22G needles.       We then withdrew the EBUS scope and inserted a therapeutic scope into        the airway. A thorough airway exam to the first subsegmental level was        unremarkable without endobronchial lesions or secretions. A BAL was        obtained from the right lower lobe superior segment (RB6) (90 ml        instilled, 18 ml of cloudy fluid returned). Adequate hemostasis was        confirmed prior to withdrawing the scope. Patient tolerated procedure.  Impression:           Abnormal CT scan of chest                        1. Successful endobronchial ultrasound bronchoscopy                         with fine needle aspiration.                        2. Mediastinal/hilar lymphadenopathy with benign                         sonographic features in stations 11L, 4L, 7, 4R and 11R.                        3. EBUS-TBNA samples from stations 11L, 4L, 7, 4R and                         11R.                        4. Normal airway anatomy without active bleeding,                         endobronchial lesions or secretions.                        5. BAL from RLL     11R Lymph Node, (EBUS) Fine Needle Aspiration:  - Negative for malignancy  - Abundant lymphoid tissue and histiocytes with anthracotic pigments.     4R Lymph Node, (EBUS) Fine Needle Aspiration:  - Negative for malignancy.  - Abundant lymphoid tissue present.     Subcarinal Lymph Node, (EBUS) Fine Needle Aspiration:  - Negative for malignancy  - Abundant lymphoid tissue present.     4L Lymph Node, (EBUS) Fine Needle Aspiration:  -  Negative for malignancy  - Abundant lymphoid tissue.     11L Lymph Node, (EBUS) Fine Needle Aspiration:  - Negative for malignancy.  - Abundant lymphoid tissue and histiocytes with anthracotic pigments.     Bronchoalveolar Lavage, RLL (ThinPrep only):  NO MALIGNANT CELLS IDENTIFIED  Benign respiratory epithelial cells and pulmonary macrophages       Microbiology Results     Date and Time Order Name Sensitivity Status Organisms Specimen ID Source     6/29/2020 1030 Fungus Culture and Smear   Preliminary   [id number] Lung     6/29/2020 1030 BAL/Brush Culture plus Stain, Semiquant.   Final   [id number] Lung     6/29/2020 1030 Acid Fast Culture Plus Stain   Preliminary   [id number] Lung     Assessment / Plan      Assessment/Plan:     Nonsmall cell lung cancer of the RLL, Stage IIIA  I reviewed the patient's history, current and prior imaging, extensive prior outside records including prior pathology, current pathology and bronchoscopy reports.     He has an enlarging RLL nodule with SUV of 17. Despite negative transbronchial biopsy, he was found to have N2 disease with a positive level 7 LN. We discussed options for definitive treatment to include induction chemo immunotherapy followed by surgical resection, chemoradiation followed by surgical resection, or definitive chemoradiation. We discussed differences in schedules and side effects. He has no contraindications to immunotherapy and I would consider nivolumab + chemotherapy induction if CT agrees for surgery after neoadjuvant induction therapy    I am going to present his case at thoracic tumor board next week as well as refer him to CTS and radiation oncology for an opinion. I will also send tempus testing to assess for targetable mutations. We will go ahead and facilitate port placement.    -     Ambulatory Referral to Cardiothoracic Surgery  -     IR Port Placement; Future  -     Ambulatory Referral to Radiation Oncology    RTC 1 week for further discussion and  treatment planning following tumor board input       Follow Up:   1 week     Neetu Ashley MD  Hematology and Oncology     Time spent on the day of service was 60 minutes inclusive of time before, during, and after office visit on record review, medically appropriate history and physical, counseling patient, ordering tests, documenting in the medical record.

## 2023-10-09 ENCOUNTER — TELEPHONE (OUTPATIENT)
Dept: ONCOLOGY | Facility: CLINIC | Age: 73
End: 2023-10-09
Payer: MEDICARE

## 2023-10-09 NOTE — TELEPHONE ENCOUNTER
Asked patient that he needs Tempus draw prior to his appointment on 10/10/23 and where to go to complete it.  Patient verbalized understanding.

## 2023-10-10 ENCOUNTER — LAB (OUTPATIENT)
Dept: LAB | Facility: HOSPITAL | Age: 73
End: 2023-10-10
Payer: MEDICARE

## 2023-10-10 ENCOUNTER — OFFICE VISIT (OUTPATIENT)
Dept: ONCOLOGY | Facility: CLINIC | Age: 73
End: 2023-10-10
Payer: MEDICARE

## 2023-10-10 VITALS
HEIGHT: 72 IN | HEART RATE: 78 BPM | OXYGEN SATURATION: 98 % | TEMPERATURE: 97.1 F | SYSTOLIC BLOOD PRESSURE: 121 MMHG | WEIGHT: 191 LBS | BODY MASS INDEX: 25.87 KG/M2 | DIASTOLIC BLOOD PRESSURE: 79 MMHG | RESPIRATION RATE: 18 BRPM

## 2023-10-10 DIAGNOSIS — C34.31 MALIGNANT NEOPLASM OF LOWER LOBE OF RIGHT LUNG: Primary | ICD-10-CM

## 2023-10-10 DIAGNOSIS — C34.91 PRIMARY LUNG CANCER WITH METASTASIS FROM LUNG TO OTHER SITE, RIGHT: Primary | ICD-10-CM

## 2023-10-10 NOTE — PROGRESS NOTES
Hematology and Oncology [place]  Office number [phone]    Fax number [phone]     Follow up     Date: 10/10/2023     Patient Name: David Barfield  MRN: [id number]  : 1950    Referring Physician: Dr. Sampson    Chief Complaint: Nonsmall cell lung cancer    Cancer Staging:  Cancer Staging   Stage IIIA (cT2b, cN2, cM0)    History of Present Illness: David Barfield is a pleasant 73 y.o. male who presents today for evaluation of NSCLC. He has a 50 pack year smoking history.    He has a history of a PET avid subpleural RLL lung nod[facility] [place] [facility]. This had an SUV of 9.8 on PET  in association with increased mediastinal LN uptake with SUV around 3. Imaging was felt secondary to osteophyte fibrosis. He did undergo bronchoscopy 2020 with negative cytology. The RLL lung nodule was not felt amenable to bronchoscopy biopsy.    CT lung screen 2023 showed:      PET CT showed mass in the RLL intensely SUV avid, max 17. Mediastinal LN not hypermetabolic above baseline.     Right lower lobe robotic transbronchial biopsy and cytology on 9/15/2023 showed benign findings. Cultures including AFT and fungal were negative.  Station 11L, Station 4L LN negative  Station 7 LN showed NSCLCa (positive for cytokeratin 7, p63, and TTF-1 with no significant staining for p40 or Napsin. The staining pattern raises the possibility of mixed adenocarcinoma and squamous cell carcinoma differentiation in this tumor). PDL1 negative.    MRI brain was negative.    He is here for an opinion regarding management of newly diagnosed lung cancer.    He has a history of sick sinus syndrome s/p PPM and moderate COPD. He describes only mild physical limitations from this. For example he recently walked 1.5 miles at [employer]. At home, he climbs 17 steps without issue. He does sit down with long activities.     Interval history:   He returns for discussion regarding treatment of  recently diagnosed lung cancer.  He got a flu shot last week and has had mild nausea since with no emesis.  No fever, cough, or other infectious symptoms.  No abdominal pain.  Accompanied by her supportive significant other and daughter.    Past Medical History:   Past Medical History:   Diagnosis Date    Abnormal ECG     Arrhythmia 2019    Asthma 2019    Emphysema, COPD    Atrial fibrillation     Bronchogenic cancer of right lung 10/4/2023    Diabetes mellitus Borderline    Emphysema/COPD     Erectile disorder     GERD (gastroesophageal reflux disease)     Hyperlipidemia     Hypertension 2019    Mumps     Mumps     Pruritus     after bath    Slow to wake up after anesthesia     Wears dentures     upper only    Wears hearing aid in both ears     usually only wears right   No personal history of myocardial infarction, cerebrovascular event, or venous thromboembolism.  No autoimmune diseases.   No prior cscope, mailed cologuard recently awaiting results.      Past Surgical History:   Past Surgical History:   Procedure Laterality Date    BONE BIOPSY      broken bone surgery in his face    BRONCHOSCOPY WITH ION ROBOTIC ASSIST N/A 9/15/2023    Procedure: BRONCHOSCOPY NAVIGATION WITH ENDOBRONCHIAL ULTRASOUND AND ION ROBOT;  Surgeon: Octaviano Sampson MD;  Location:  [facility];  Service: Robotics - Pulmonary;  Laterality: N/A;  ion #6 - 0032  - 0015  Cath guide 0061    EBUS balloon removed and intact    CARDIAC ELECTROPHYSIOLOGY PROCEDURE N/A 08/17/2021    Procedure: Pacemaker DC new;  Surgeon: Kayy Box MD;  Location:  [facility];  Service: Cardiology;  Laterality: N/A;    FACIAL FRACTURE SURGERY      INSERT / REPLACE / REMOVE PACEMAKER  August 17, 2021       Family History:   Family History   Problem Relation Age of Onset    Aneurysm Mother         brain    Dementia Father     Leukemia Sister     Hypertension Paternal Grandfather     Heart disease Paternal Grandmother    Sister  leukemia at age 21  No other malignancy    Social History:   Social History     Socioeconomic History    Marital status: Single   Tobacco Use    Smoking status: Every Day     Packs/day: 1.00     Years: 53.00     Additional pack years: 0.00     Total pack years: 53.00     Types: Cigarettes     Start date: 1/1/1968    Smokeless tobacco: Never    Tobacco comments:     Still smoke   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, [place]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    Medications:     Current Outpatient Medications:     albuterol sulfate  (90 Base) MCG/ACT inhaler, Inhale 2 puffs Every 4 (Four) Hours As Needed for Wheezing., Disp: 18 g, Rfl: 11    fexofenadine (ALLEGRA) 180 MG tablet, Take 1 tablet by mouth As Needed., Disp: , Rfl:     fluticasone (VERAMYST) 27.5 MCG/SPRAY nasal spray, 2 sprays into the nostril(s) as directed by provider 1 (One) Time As Needed for Rhinitis or Allergies., Disp: , Rfl:     Fluticasone-Umeclidin-Vilant (Trelegy Ellipta) 100-62.5-25 MCG/ACT inhaler, Inhale 1 puff Daily., Disp: 3 each, Rfl: 3    lisinopril (PRINIVIL,ZESTRIL) 2.5 MG tablet, Take 1 tablet by mouth As Needed (elevated blood pressure). Take 1/2 tablet po prn (Patient taking differently: Take 1 tablet by mouth As Needed (elevated blood pressure systolic over 140). Take 1/2 tablet po prn), Disp: 30 tablet, Rfl: 1    pravastatin (PRAVACHOL) 80 MG tablet, Take 1 tablet by mouth Every Night., Disp: 30 tablet, Rfl: 11    sildenafil (VIAGRA) 100 MG tablet, Take 0.5 tablets by mouth Daily As Needed for Erectile Dysfunction., Disp: , Rfl:     Allergies:   No Known Allergies    Objective     Vital Signs:   Vitals:    10/10/23 1528   BP: 121/79   Pulse: 78   Resp: 18   Temp: 97.1 øF (36.2 øC)   TempSrc: Infrared   SpO2: 98%   Weight: 86.6 kg (191 lb)   Height: 182.9 cm  "(72.01\")   PainSc: 0-No pain    Body mass index is 25.9 kg/mý.   Pain Score    10/10/23 1528   PainSc: 0-No pain       ECOG Performance Status: 1 - Symptomatic but completely ambulatory    Physical Exam:   General: No acute distress. Well appearing   HEENT: Normocephalic, atraumatic. Sclera anicteric.   Neck: supple, no adenopathy.   Cardiovascular: regular rate and rhythm. No murmurs.   Respiratory: Normal rate. Clear to auscultation bilaterally  Abdomen: Soft, nontender, non distended with normoactive bowel sounds  Lymph: no cervical, supraclavicular or axillary adenopathy  Neuro: Alert and oriented x 3. No focal deficits.   Ext: Symmetric, no swelling.   Psych: Euthymic    Laboratory/Imaging Reviewed:   No visits with results within 2 Week(s) from this visit.   Latest known visit with results is:   Admission on 09/15/2023, Discharged on 09/15/2023   Component Date Value Ref Range Status    Glucose 09/15/2023 102  70 - 130 mg/dL Final    Case Report 09/15/2023    Final                    Value:Surgical Pathology Report                         Case: [id number]                                  Authorizing Provider:  Octaviano Sampson MD      Collected:           09/15/2023 10:28 AM          Ordering Location:     [facility]   Received:            09/15/2023 12:26 PM                                 ENDO SUITES                                                                  Pathologist:           Ricardo Hoffman MD                                                        Specimens:   1) - Lung, Right Lower Lobe, RLL TBNA for path                                                      2) - Lung, Right Lower Lobe, RLL TBBX for path                                                      3) - Lymph Node, station 11L TBNA for path                                                          4) - Lymph Node, station 4L TBNA for path                                                           5) - Lymph Node, lymph " node station 7 TBNA for path                                        Clinical Information 09/15/2023    Final                    Value:This result contains rich text formatting which cannot be displayed here.    Final Diagnosis 09/15/2023    Final                    Value:This result contains rich text formatting which cannot be displayed here.    Comment 09/15/2023    Final                    Value:This result contains rich text formatting which cannot be displayed here.    Gross Description 09/15/2023    Final                    Value:This result contains rich text formatting which cannot be displayed here.    Special Stains 09/15/2023    Final                    Value:This result contains rich text formatting which cannot be displayed here.    Microscopic Description 09/15/2023    Final                    Value:This result contains rich text formatting which cannot be displayed here.    Reference Lab Report 09/15/2023    Final                    Value:[facility]  [address] [place] [address]  Phone: [phone] or [phone]  Fax: [phone]  To Madden M.D., Medical Director    PATIENT NAME                                    LABORATORY NO.  RADU UMANA.                            [id number]  [id number]                                  AGE                  SEX      SSN            CLIENT REF #  [facility]                    73        1950            [id number]    [id number]  [address] [facility]                       REQUESTING M.D.         ATTENDING [place] [address]                         ZARA BUTLER  DATE COLLECTED          DATE RECEIVED           DATE REPORTED  09/15/2023              09/15/2023              2023    DIAGNOSIS:  A.       BRONCH BRUSH, RIGHT LOWER LOBE:  Negative for malignant cells.  B.       BRONCHIAL LAVAGE, RIGHT LOWER LOBE:  Negative for malignant cells.  C.        BRONCH WASH, RIGHT                           LOWER LOBE:  Negative for malignant cells.    MICROSCOPIC DESCRIPTION:  A.     Bronchial cells are present.  B.     Ciliated respiratory epithelial cells and pulmonary macrophages.  C.     Ciliated respiratory epithelial cells and pulmonary macrophages.    Professional interpretation rendered by Kurt Sultana M.D., AMADA at [facility], [address] [place] [address].    CLINICAL HISTORY:  Nodule of lower lobe of right lung      SPECIMENS SUBMITTED:  A.    BRONCH BRUSH , RIGHT LOWER LOBE  B.    BRONCHIAL LAVAGE , RIGHT LOWER LOBE  C.    BRONCH WASH , RIGHT LOWER LOBE    GROSS SPECIMEN DESCRIPTION:  A.     30ccs of clear colorless fluid with one bronch brush without visible  sediment received in fixative  B.     40ccs of clear colorless fluid with visible sediment received in fixative  C.     45ccs of cloudy dark pink fluid with visible mucoid sediment received in  fixative  Cell block has been examined.    CYTOTECHNOLOGIST:         NICO SHEPHERD (ASCP)                                                 REVIEWED, DIAGNOSED AND ELECTRONICALLY  SIGNED BY:    Kurt Sultana M.D., AMADA  CPT CODES:  88112x3, 87856      Fungus Culture 09/15/2023 No fungus isolated at 3 weeks   Preliminary    AFB Culture 09/15/2023 No AFB isolated at 3 weeks   Preliminary    AFB Stain 09/15/2023 No acid fast bacilli seen on concentrated smear   Preliminary    Fungal Stain 09/15/2023 No fungal elements seen   Final    Respiratory Culture 09/15/2023 No growth   Final    Gram Stain 09/15/2023 Rare (1+) WBCs seen   Final    Gram Stain 09/15/2023 Rare (1+) Red blood cells   Final    Gram Stain 09/15/2023 No organisms seen   Final    Fungus Culture 09/15/2023 No fungus isolated at 3 weeks   Preliminary    AFB Culture 09/15/2023 No AFB isolated at 3 weeks   Preliminary    AFB Stain 09/15/2023 No acid fast bacilli seen on concentrated smear   Preliminary    Fungal Stain 09/15/2023 No fungal  elements seen   Final    Respiratory Culture 09/15/2023 Rare Normal respiratory lemuel. No S. aureus or Pseudomonas aeruginosa detected. Final report.   Final    Gram Stain 09/15/2023 Rare (1+) WBCs seen   Final    Gram Stain 09/15/2023 Moderate (3+) Red blood cells   Final    Gram Stain 09/15/2023 No organisms seen   Final       MRI Brain With & Without Contrast    Result Date: 9/28/2023  Narrative: MRI BRAIN W WO CONTRAST Date of Exam: 9/28/2023 8:22 AM EDT Indication: Non-small cell lung cancer (NSCLC), staging.  Comparison: None available. Technique:  Routine multiplanar/multisequence sequence images of the brain were obtained before and after the uneventful administration of 18 mL Multihance. Findings: No acute infarct is present on diffusion weighted sequences. Midline structures are normal and the craniocervical junction appears satisfactory. Mild age-related changes are present with some generalized volume loss and few areas of subcortical white matter T2 hyperintensity, likely to represent typical sequela of microvascular ischemic change. There is otherwise no evidence of intracranial hemorrhage, mass or mass effect. There is no abnormal brain parenchymal enhancement. The ventricles are normal in size and configuration, accounting for surrounding volume loss. The orbits are normal. The paranasal sinuses are clear.     Impression: Impression: No evidence of intracranial metastatic disease. Mild to moderate typical age-related changes are present as above. There is otherwise no evidence of acute ischemia, hemorrhage, mass or abnormal enhancement. Electronically Signed: Alexandre Mtaos MD  9/28/2023 12:40 PM EDT  Workstation ID: [id number]    XR Chest 1 View    Result Date: 9/15/2023  Narrative: XR CHEST 1 VW COMPARISON: Chest x-ray 10/21/2022, chest CT on 9/8/2023 HISTORY: s/p bronch FINDINGS: Technical adequacy: Adequate Central airways: Unremarkable Heart: Unremarkable. Right subclavian route dual chamber pacer  wires noted and unchanged. Great vessels: Unremarkable Mediastinum: Unremarkable Lungs: Opacity in the lower right lung zone noted again. This has been previously seen on the CT. Pulmonary donald:Unremarkable Pleura: Unremarkable Skeletal structures: Degenerative changes Extra thoracic soft tissues:Unremarkable Additional comments: None     Impression: Impression: Noted again is opacity in the right lung lower zone. No acute abnormalities on this exam. Electronically Signed: Kip Gutiérrez MD  9/15/2023 11:50 AM EDT  Workstation ID: [id number]    FL C Arm During Surgery    Result Date: 9/15/2023  Narrative: This procedure was auto-finalized with no dictation required.     Procedures    Bronch 6/2020  Findings:       An EBUS bronchoscope was advanced through the endotracheal tube under        general anesthesia. A comprehensive EBUS survey of all available lymph        node stations revealed lymphadenopathy with benign sonographic features        in stations 11L (9 mm), 4L (8 mm), 7 (15 mm), 4R (11 mm), and 11R (10        mm). EBUS-TBNA x 4 passes (at least) was done at each of those stations        in that sequence with the Olympus ViziShot 21G and 22G needles.       We then withdrew the EBUS scope and inserted a therapeutic scope into        the airway. A thorough airway exam to the first subsegmental level was        unremarkable without endobronchial lesions or secretions. A BAL was        obtained from the right lower lobe superior segment (RB6) (90 ml        instilled, 18 ml of cloudy fluid returned). Adequate hemostasis was        confirmed prior to withdrawing the scope. Patient tolerated procedure.  Impression:           Abnormal CT scan of chest                        1. Successful endobronchial ultrasound bronchoscopy                         with fine needle aspiration.                        2. Mediastinal/hilar lymphadenopathy with benign                         sonographic features in stations 11L, 4L, 7,  4R and 11R.                        3. EBUS-TBNA samples from stations 11L, 4L, 7, 4R and                         11R.                        4. Normal airway anatomy without active bleeding,                         endobronchial lesions or secretions.                        5. BAL from RLL     11R Lymph Node, (EBUS) Fine Needle Aspiration:  - Negative for malignancy  - Abundant lymphoid tissue and histiocytes with anthracotic pigments.     4R Lymph Node, (EBUS) Fine Needle Aspiration:  - Negative for malignancy.  - Abundant lymphoid tissue present.     Subcarinal Lymph Node, (EBUS) Fine Needle Aspiration:  - Negative for malignancy  - Abundant lymphoid tissue present.     4L Lymph Node, (EBUS) Fine Needle Aspiration:  - Negative for malignancy  - Abundant lymphoid tissue.     11L Lymph Node, (EBUS) Fine Needle Aspiration:  - Negative for malignancy.  - Abundant lymphoid tissue and histiocytes with anthracotic pigments.     Bronchoalveolar Lavage, RLL (ThinPrep only):  NO MALIGNANT CELLS IDENTIFIED  Benign respiratory epithelial cells and pulmonary macrophages       Microbiology Results     Date and Time Order Name Sensitivity Status Organisms Specimen ID Source     6/29/2020 1030 Fungus Culture and Smear   Preliminary   [id number] Lung     6/29/2020 1030 BAL/Brush Culture plus Stain, Semiquant.   Final   [id number] Lung     6/29/2020 1030 Acid Fast Culture Plus Stain   Preliminary   [id number] Lung     Assessment / Plan      Assessment/Plan:     Nonsmall cell lung cancer of the RLL, Stage IIIA  -He has an enlarging RLL nodule with SUV of 17. Despite negative transbronchial biopsy, he was found to have N2 disease with a positive level 7 LN. We discussed options for definitive treatment to include induction chemo immunotherapy followed by surgical resection, chemoradiation followed by surgical resection, or definitive chemoradiation. We discussed differences in schedules and side effects. He has no contraindications to  immunotherapy and I would consider nivolumab + chemotherapy induction.  His case was reviewed at multidisciplinary tumor board including thoracic surgery with Dr. Garza this morning.  He was felt to be a good candidate for neoadjuvant chemo immunotherapy followed by resection assessment.  -I recommended nivolumab, carboplatin, paclitaxel x3 cycles followed by repeat imaging.  He is awaiting scheduling for CT surgery opinion, but given surgical input this morning we will proceed with therapy initiation.  -Tempus pending  -Proceed with port placement  -Chemotherapy education    Follow Up:   1 week treatment start     Neetu Ashley MD  Hematology and Oncology     I have spent a total of 40 min on reviewing test results/preparing to see patient, counseling patient, performing medically appropriate exam, discussion with specialists, coordination of care, placement of orders and documenting clinical information in the electronic or other health record

## 2023-10-11 ENCOUNTER — HOSPITAL ENCOUNTER (OUTPATIENT)
Dept: RADIATION ONCOLOGY | Facility: HOSPITAL | Age: 73
Setting detail: RADIATION/ONCOLOGY SERIES
Discharge: HOME | End: 2023-10-11
Payer: MEDICARE

## 2023-10-13 ENCOUNTER — TELEPHONE (OUTPATIENT)
Dept: CARDIAC SURGERY | Facility: CLINIC | Age: 73
End: 2023-10-13

## 2023-10-13 NOTE — TELEPHONE ENCOUNTER
Caller: ISAÍAS ANDERSON    Relationship to patient: Emergency Contact    Best call back number: [phone]     Chief complaint: PATIENT REQUEST TO BE SCHEDULED WITH DR. ALTAMIRANO.     Type of visit: NEW PATIENT     Requested date: NEXT AVAILABLE      If rescheduling, when is the original appointment: 10/26/23     Additional notes:PATIENT WOULD LIKE A CALL BACK TO RESCHEDULE.

## 2023-10-17 ENCOUNTER — LAB (OUTPATIENT)
Dept: LAB | Facility: HOSPITAL | Age: 73
End: 2023-10-17
Payer: MEDICARE

## 2023-10-17 ENCOUNTER — OFFICE VISIT (OUTPATIENT)
Dept: ONCOLOGY | Facility: CLINIC | Age: 73
End: 2023-10-17
Payer: MEDICARE

## 2023-10-17 ENCOUNTER — EDUCATION (OUTPATIENT)
Dept: ONCOLOGY | Facility: HOSPITAL | Age: 73
End: 2023-10-17
Payer: MEDICARE

## 2023-10-17 ENCOUNTER — HOSPITAL ENCOUNTER (OUTPATIENT)
Dept: ONCOLOGY | Facility: HOSPITAL | Age: 73
Discharge: HOME | End: 2023-10-17
Payer: MEDICARE

## 2023-10-17 VITALS
RESPIRATION RATE: 20 BRPM | WEIGHT: 195 LBS | HEIGHT: 72 IN | TEMPERATURE: 97.5 F | OXYGEN SATURATION: 99 % | SYSTOLIC BLOOD PRESSURE: 142 MMHG | HEART RATE: 80 BPM | DIASTOLIC BLOOD PRESSURE: 88 MMHG | BODY MASS INDEX: 26.41 KG/M2

## 2023-10-17 DIAGNOSIS — C34.31 MALIGNANT NEOPLASM OF LOWER LOBE OF RIGHT LUNG: Primary | ICD-10-CM

## 2023-10-17 DIAGNOSIS — C34.31 MALIGNANT NEOPLASM OF LOWER LOBE OF RIGHT LUNG: ICD-10-CM

## 2023-10-17 LAB
ALBUMIN SERPL-MCNC: 4 G/DL (ref 3.5–5.2)
ALBUMIN/GLOB SERPL: 1 G/DL
ALP SERPL-CCNC: 98 U/L (ref 39–117)
ALT SERPL W P-5'-P-CCNC: 11 U/L (ref 1–41)
ANION GAP SERPL CALCULATED.3IONS-SCNC: 8 MMOL/L (ref 5–15)
AST SERPL-CCNC: 14 U/L (ref 1–40)
BASOPHILS # BLD AUTO: 0.02 10*3/MM3 (ref 0–0.2)
BASOPHILS NFR BLD AUTO: 0.2 % (ref 0–1.5)
BILIRUB SERPL-MCNC: 0.3 MG/DL (ref 0–1.2)
BUN SERPL-MCNC: 8 MG/DL (ref 8–23)
BUN/CREAT SERPL: 7.3 (ref 7–25)
CALCIUM SPEC-SCNC: 9.5 MG/DL (ref 8.6–10.5)
CHLORIDE SERPL-SCNC: 98 MMOL/L (ref 98–107)
CO2 SERPL-SCNC: 27 MMOL/L (ref 22–29)
CREAT SERPL-MCNC: 1.1 MG/DL (ref 0.76–1.27)
DEPRECATED RDW RBC AUTO: 48.3 FL (ref 37–54)
EGFRCR SERPLBLD CKD-EPI 2021: 70.4 ML/MIN/1.73
EOSINOPHIL # BLD AUTO: 0.06 10*3/MM3 (ref 0–0.4)
EOSINOPHIL NFR BLD AUTO: 0.7 % (ref 0.3–6.2)
ERYTHROCYTE [DISTWIDTH] IN BLOOD BY AUTOMATED COUNT: 13.5 % (ref 12.3–15.4)
GLOBULIN UR ELPH-MCNC: 3.9 GM/DL
GLUCOSE SERPL-MCNC: 87 MG/DL (ref 65–99)
HCT VFR BLD AUTO: 42.1 % (ref 37.5–51)
HGB BLD-MCNC: 13.9 G/DL (ref 13–17.7)
IMM GRANULOCYTES # BLD AUTO: 0.02 10*3/MM3 (ref 0–0.05)
IMM GRANULOCYTES NFR BLD AUTO: 0.2 % (ref 0–0.5)
LYMPHOCYTES # BLD AUTO: 1.98 10*3/MM3 (ref 0.7–3.1)
LYMPHOCYTES NFR BLD AUTO: 23.2 % (ref 19.6–45.3)
MCH RBC QN AUTO: 31.4 PG (ref 26.6–33)
MCHC RBC AUTO-ENTMCNC: 33 G/DL (ref 31.5–35.7)
MCV RBC AUTO: 95 FL (ref 79–97)
MONOCYTES # BLD AUTO: 0.82 10*3/MM3 (ref 0.1–0.9)
MONOCYTES NFR BLD AUTO: 9.6 % (ref 5–12)
NEUTROPHILS NFR BLD AUTO: 5.63 10*3/MM3 (ref 1.7–7)
NEUTROPHILS NFR BLD AUTO: 66.1 % (ref 42.7–76)
PLATELET # BLD AUTO: 265 10*3/MM3 (ref 140–450)
PMV BLD AUTO: 8.3 FL (ref 6–12)
POTASSIUM SERPL-SCNC: 4.5 MMOL/L (ref 3.5–5.2)
PROT SERPL-MCNC: 7.9 G/DL (ref 6–8.5)
RBC # BLD AUTO: 4.43 10*6/MM3 (ref 4.14–5.8)
SODIUM SERPL-SCNC: 133 MMOL/L (ref 136–145)
T4 FREE SERPL-MCNC: 1.13 NG/DL (ref 0.93–1.7)
TSH SERPL DL<=0.05 MIU/L-ACNC: 1.81 UIU/ML (ref 0.27–4.2)
WBC NRBC COR # BLD: 8.53 10*3/MM3 (ref 3.4–10.8)

## 2023-10-17 PROCEDURE — 84443 ASSAY THYROID STIM HORMONE: CPT

## 2023-10-17 PROCEDURE — 80053 COMPREHEN METABOLIC PANEL: CPT

## 2023-10-17 PROCEDURE — 1126F AMNT PAIN NOTED NONE PRSNT: CPT | Performed by: NURSE PRACTITIONER

## 2023-10-17 PROCEDURE — 99214 OFFICE O/P EST MOD 30 MIN: CPT | Performed by: NURSE PRACTITIONER

## 2023-10-17 PROCEDURE — 36415 COLL VENOUS BLD VENIPUNCTURE: CPT

## 2023-10-17 PROCEDURE — 85025 COMPLETE CBC W/AUTO DIFF WBC: CPT

## 2023-10-17 PROCEDURE — 84439 ASSAY OF FREE THYROXINE: CPT

## 2023-10-17 RX ORDER — OLANZAPINE 5 MG/1
5 TABLET ORAL NIGHTLY
Qty: 4 TABLET | Refills: 2 | Status: SHIPPED | OUTPATIENT
Start: 2023-10-17

## 2023-10-17 RX ORDER — LIDOCAINE AND PRILOCAINE 25; 25 MG/G; MG/G
1 CREAM TOPICAL AS NEEDED
Qty: 30 G | Refills: 3 | Status: SHIPPED | OUTPATIENT
Start: 2023-10-17

## 2023-10-17 RX ORDER — ONDANSETRON HYDROCHLORIDE 8 MG/1
8 TABLET, FILM COATED ORAL 3 TIMES DAILY PRN
Qty: 30 TABLET | Refills: 2 | Status: SHIPPED | OUTPATIENT
Start: 2023-10-17

## 2023-10-17 NOTE — PLAN OF CARE
Outpatient Infusion  [address] [place] [address]  [phone]      CHEMOTHERAPY EDUCATION    NAME:  David Barfield      : 1950           DATE: 10/17/23    Medication Education Sheets: (select all that apply)  Carboplatin, Nivolumab, and Paclitaxel    Other Education Sheets: (select all that apply)  CINV, Diarrhea, HOPA Immune Checkpoint Inhibitors, Checkpoint Inhibitor Wallet Card, and Symptom Tracker Sheet and ALICIA Information    Chemotherapy Regimen:   OP LUNG  Nivolumab 360mg /  PACLitaxel / CARBOplatin AUC=5 every 21 days    TOPICS EDUCATION PROVIDED COMMENTS   ANEMIA:  role of RBC, cause, s/s, ways to manage, role of transfusion [x] Reviewed the role of RBC and the use of transfusions if hemoglobin decreases too much.  Patient to notify us if he experiences shortness of breath, dizziness, or palpitations.  Also let patient know he could feel more tired than usual and to try to stay active, but rest if he needs to.    THROMBOCYTOPENIA:  role of platelet, cause, s/s, ways to prevent bleeding, things to avoid, when to seek help [x] Reviewed the role of platelets in blood clotting and when to call clinic (bloody nose that bleeds for 5 minutes despite pressure, a cut that won't stop bleeding despite pressure, gums that bleed excessively with brushing or flossing). Recommended using an electric razor, soft bristle toothbrush, and blowing the nose gently.    NEUTROPENIA:  role of WBC, cause, infection precautions, s/s of infection, when to call MD [x] Reviewed the role of WBC, good infection prevention practices, and when to call the clinic (temperature 100.4F, sore throat, burning urination, etc).   NUTRITION & APPETITE CHANGES:  importance of maintaining healthy diet & weight, ways to manage to improve intake, dietary consult, exercise regimen, electrolyte and/or blood glucose abnormalities [x] Metallic Taste: Informed patient of the potential for developing metallic  taste and smell. Recommended flavoring water if it tastes metallic, using plastic utensils instead of metal utensils, and avoiding drinking from a metal can or cup to help mitigate this adverse effect.   DIARRHEA:  causes, s/s of dehydration, ways to manage, dietary changes, when to call MD [x] Both: Discussed the risk of diarrhea and immune related colitis. Can use OTC loperamide with diarrhea onset, but call the clinic if 4-6 episodes in 24 hours not relieved by OTC loperamide. Discussed the role of high-dose steroids for the treatment of immune related colitis.    NAUSEA & VOMITING:  cause, use of antiemetics, dietary changes, when to call MD [x] Emetic risk: High  Premeds: Dexamethasone (for prevention of infusion reaction and nausea), Fosaprepitant, and Palonosetron   Scheduled home meds: Olanzapine 5 mg by mouth at night on days 1,2,3,4 after chemotherapy to prevent delayed nausea  PRN home meds: Ondansetron 8mg PO TID PRN  N/V  Pharmacy home meds sent to: [facility] ([facility])    Instructed the patient to take the scheduled anti-nausea medications even if he does not feel nauseous.  I explained this is to prevent delayed nausea.    Instructed the patient to take a dose of the PRN medication at the first onset of nausea and if it's not working to call us for additional medications.  Also provided non-drug measures to mitigate nausea.   MOUTH SORES:  causes, oral care, ways to manage [x] Mouth sores can be prevented by making a mouth wash mixture of salt, baking soda, and water. The patient was instructed to swish and spit four times daily after meals and before bedtime. Also recommended using a soft bristle toothbrush and avoiding alcohol-containing OTC mouthwashes.    ALOPECIA:  cause, ways to manage, resources [x] Discussed the possibility of hair loss with the patient. Informed patient that he could request a prescription for a wig if desired and most of the cost is usually  covered by insurance. Recommended covering the head with a hat and/or protecting the skin on the head with SPF 30 or higher.    NERVOUS SYSTEM CHANGES:  causes, s/s, neuropathies, cognitive changes, ways to manage [x] Discussed the adverse effect of peripheral neuropathy and signs/symptoms associated with this adverse effect. Instructed patient to call if symptoms become more frequent or worsen. Patient reports existing neuropathy in his right hand. Encouraged him to notify the clinic if this worsens, spreads, or becomes more persistent.    PAIN:  causes, ways to manage [x] Chemo: Discussed muscle and joint aches/pains with chemotherapy, and recommended the use of OTC pain relief with ibuprofen or acetaminophen if needed.  Vesicant Pain: Instructed patient to notify the nurse of any pain at the IV site during the infusion. Less likely a concern with use of port.  EMLA Cream: Discussed rx for EMLA cream, and the benefit of use 45-60 minutes prior to access of port for lab draws / infusions.   SKIN & NAIL CHANGES:  cause, s/s, ways to manage [x] Both: Discussed the potential for a rash or itchy skin from immunotherapy, offered nonpharmacologic prevention strategies, and instructed the patient to call if a rash develops and worsens. Informed the patient of the possibility for dark or white lines on finger and toenails during treatment, and that nails may become brittle and even fall off in extreme cases. This will likely reverse after treatment concludes.    ORGAN TOXICITIES:  cause, s/s, need for diagnostic tests, labs, when to notify MD [x] Discussed potential effects on organ systems, monitoring, diagnostic tests, labs, and when to notify the clinic. Discussed the signs/symptoms of the following: cardiotoxicity (note patient has a PPM), central neurotoxicity (confusion, vision changes, stiff neck, seizure), hepatotoxicity, hormone gland changes (most commonly the thyroid), lung changes and nephrotoxicity.   INFUSION  RELATED REACTIONS or INJECTION-SITE REACTION:  Cause, s/s, anaphylaxis, monitoring, etc. [x] Premeds: Dexamethasone, Diphenhydramine, and Famotidine    Discussed the risk of an infusion reaction and symptoms such as: fever, chills, dizziness, itchiness or rash, flushing, trouble breathing, wheezing, sudden back pain, or feeling faint.  Instructed the patient to notify his nurse if he starts feeling weird at any point during his infusion.    Reviewed how infusion reactions are managed.   MISCELLANEOUS:  drug interactions, administration, labs, etc. [x] Discussed chemotherapy schedule, lab draws, infusion times, and total expected visit time.   DDIs:  concomitant use of Zyprexa and Trelegy Ellipta enhances the risk of anticholinergic side effects. Encouraged patient to notify the clinic of any signs of urinary retention, constipation, dry mouth, or increased heart rate / palpitations.  Lab draws: On or before day 1 of each cycle, no sooner than 3 days early. Patient's significant other is hopeful that Dr. Ashley will approve of more frequent lab draws during the course of treatment.   INFERTILITY & SEXUALITY:    causes, fertility preservation options, sexuality changes, ways to manage, importance of birth control [x] IV Oncology Therapy: Reviewed safe sex practices and the importance of minimizing exposure to body fluids for 48 hours after each dose of IV oncology therapy. The patient is not sexually active with a woman of childbearing potential.   HOME CARE:  storing of oral chemo, how to manage bodily fluids [x] IV - Counseled on management of soiled linens and proper flush technique.  Discussed how to manage all the side effects at home and advised when to contact the MD office     Medications:  Prior to Admission medications    Medication Sig Start Date End Date Taking? Authorizing Provider   albuterol sulfate  (90 Base) MCG/ACT inhaler Inhale 2 puffs Every 4 (Four) Hours As Needed for Wheezing. 8/29/23    Octaviano Sampson MD   fexofenadine (ALLEGRA) 180 MG tablet Take 1 tablet by mouth As Needed.    Provider, MD Karissa   fluticasone (VERAMYST) 27.5 MCG/SPRAY nasal spray 2 sprays into the nostril(s) as directed by provider 1 (One) Time As Needed for Rhinitis or Allergies.    Provider, MD Karissa   Fluticasone-Umeclidin-Vilant (Trelegy Ellipta) 100-62.5-25 MCG/ACT inhaler Inhale 1 puff Daily. 8/29/23   Octaviano Sampson MD   lisinopril (PRINIVIL,ZESTRIL) 2.5 MG tablet Take 1 tablet by mouth As Needed (elevated blood pressure). Take 1/2 tablet po prn  Patient taking differently: Take 1 tablet by mouth As Needed (elevated blood pressure systolic over 140). Take 1/2 tablet po prn 11/18/21   Caitie Marc APRN   pravastatin (PRAVACHOL) 80 MG tablet Take 1 tablet by mouth Every Night. 11/18/21   Caitie Marc APRN   sildenafil (VIAGRA) 100 MG tablet Take 0.5 tablets by mouth Daily As Needed for Erectile Dysfunction.    Provider, MD Karissa     Notes: All questions and concerns were addressed. Provided a personalized treatment calendar to patient (includes treatment and lab schedule). Provided patient with contact information for the pharmacist and clinic while instructing him to call if any questions or concerns arise. Informed consent for treatment was obtained. Patient and his significant other were both receptive to information and expressed understanding.     Ann Cowden Mayer, PharmD, [facility]  Oncology Clinical Pharmacist  Phone [phone]    10/17/2023  11:02 EDT

## 2023-10-17 NOTE — PROGRESS NOTES
CHEMOTHERAPY PREPARATION    David Barfield  [id number]  1950    Chief Complaint: Treatment preparation and needs assessment    History of present illness:  David Barfield is a 73 y.o. year old male who is here today for treatment preparation and needs assessment.  The patient has been diagnosed with lung cancer and is scheduled to begin treatment with Nivolumab 360mg /  PACLitaxel / CARBOplatin.     Oncology History:    Oncology/Hematology History   Malignant neoplasm of lower lobe of right lung   10/4/2023 Initial Diagnosis    Malignant neoplasm of lower lobe of right lung     10/4/2023 Cancer Staged    Staging form: Lung, AJCC 8th Edition  - Clinical: Stage IIIA (cT2b, cN2, cM0) - Signed by Neetu Ashley MD on 10/4/2023     10/19/2023 -  Chemotherapy    OP LUNG  Nivolumab 360mg /  PACLitaxel / CARBOplatin AUC=5          The current medication list and allergy list were reviewed and reconciled.     Past Medical History, Past Surgical History, Social History, Family History have been reviewed and are without significant changes except as mentioned.    Review of Systems:    Review of Systems   Constitutional:  Negative for appetite change, fatigue, fever and unexpected weight change.   HENT:  Negative for mouth sores, sore throat and trouble swallowing.    Respiratory:  Negative for cough, shortness of breath and wheezing.    Cardiovascular:  Negative for chest pain, palpitations and leg swelling.   Gastrointestinal:  Negative for abdominal distention, abdominal pain, constipation, diarrhea, nausea and vomiting.   Genitourinary:  Negative for difficulty urinating, dysuria and frequency.   Musculoskeletal:  Negative for arthralgias.   Skin:  Negative for pallor, rash and wound.   Neurological:  Negative for dizziness and weakness.   Hematological:  Does not bruise/bleed easily.   Psychiatric/Behavioral:  Negative for confusion and sleep disturbance. The patient is not nervous/anxious.        Physical  Exam:    Vitals:    10/17/23 1301   BP: 142/88   Pulse: 80   Resp: 20   Temp: 97.5 °F (36.4 °C)   SpO2: 99%     Vitals:    10/17/23 1301   PainSc: 0-No pain          ECOG: (0) Fully Active - Able to Carry On All Pre-disease Performance Without Restriction    General: well appearing, in no acute distress    Psych: Mood is stable            NEEDS ASSESSMENTS    Genetics  The patient's new diagnosis and family history have been reviewed for genetic counseling needs. A genetic referral is not recommended.     Psychosocial  The patient has completed a PHQ-9 Depression Screening and the Distress Thermometer (DT) today.   PHQ-9 results show 0 (No Depression). The patient scored their distress today as 1 on a scale of 0-10 with 0 being no distress and 10 being extreme distress.   Problems marked by the patient as being an issue for them within the last week include no problems.   Results were reviewed along with psychosocial resources offered by our cancer center.  Our oncology social worker will be flagged for a DT score of 4 or above, and a same day call will be made for a score of 9 or 10.  A mental health referral is offered at this time. The patient is not interested in a referral to [facility].   Copies of patient's questionnaires will be scanned into EMR for details and further reference.    Barriers to care  A barriers form was also completed by the patient today. We discussed services offered by our facility to help him have adequate access to care. The patient was given the name and contact information for our Oncology Social Worker, Huma Graham.  Based upon barriers assessment today, the patient will not require a follow-up call from the  to further discuss needs.   A copy of the barriers form will also be scanned into EMR for details and further reference.     VAD Assessment  The patient and I discussed planned intervenous chemotherapy as well as other IV treatments that are often needed  "throughout the course of treatment. These may include, but are not limited to blood transfusions, antibiotics, and IV hydration. The vasculature does appear to be adequate for multiple peripheral IVs throughout their treatment course. Patient is scheduled for Port-A-Cath placement tomorrow.      Advanced Care Planning  The patient and I discussed advanced care planning, \"Conversations that Matter\".   This service was offered, free of charge, for development of advance directives with a certified ACP facilitator.  The patient does have an up-to-date advanced directive. This document is on file with our office. The patient is not interested in an appointment with one of our facilitators to create or update their advanced directives.      Palliative Care  The patient and I discussed palliative care services. Palliative care is not the same as Hospice care. This is specialized medical care for people living with serious illness with the goal of improving quality of life for the patient and their family. [facility] offers our patients outpatient palliative care early along with their treatment to assist in coordination of care, symptom management, pain management, and medical decision making.  Oncology criteria for palliative care referral is not met at this time. The patient is not interested in a palliative care consultation.     Additional Referral needs  none      CHEMOTHERAPY EDUCATION    Chemotherapy education completed and consent obtained per oncology pharmacist.  See their documentation for further details.    Booklets Given: Chemotherapy and You [x]  Nutrition for the Patient with Cancer During Treatment [x]    Sexuality/Fertility Books []     Chemotherapy Regimen:   Treatment Plans       Name Type Plan Dates Plan Provider         Active    OP LUNG  Nivolumab 360mg /  PACLitaxel / CARBOplatin AUC=5  ONCOLOGY TREATMENT  10/18/2023 - Present Neetu Ashley MD                      Chemotherapy education " comprehension reviewed. Questions answered.      Assessment and Plan:    Diagnoses and all orders for this visit:    1. Malignant neoplasm of lower lobe of right lung (Primary)  -     lidocaine-prilocaine (EMLA) 2.5-2.5 % cream; Apply 1 application  topically to the appropriate area as directed As Needed (45-60 minutes prior to port access.  Cover with saran/plastic wrap.).  Dispense: 30 g; Refill: 3  -     OLANZapine (zyPREXA) 5 MG tablet; Take 1 tablet by mouth Every Night. Take nightly x 4 starting night of chemotherapy.  Dispense: 4 tablet; Refill: 2  -     ondansetron (ZOFRAN) 8 MG tablet; Take 1 tablet by mouth 3 (Three) Times a Day As Needed for Nausea or Vomiting.  Dispense: 30 tablet; Refill: 2      The patient and I have reviewed their cancer diagnosis and scheduled treatment plan. Needs assessment was completed including genetics, psychosocial needs, barriers to care, VAD evaluation, advanced care planning, and palliative care services. Referrals have been ordered as appropriate based upon our evaluation and patient desires.     Chemotherapy teaching was also completed today per pharmacy.  Adequate time was given to answer questions.  Patient and family are aware of their care team members and contact information if they have questions or problems throughout the treatment course. The patient is adequately prepared to begin treatment as scheduled on 10/19/2023.  He is scheduled for follow up with Dr. Ashley prior to starting treatment.      Reviewed with patient education regarding EMLA cream, olanzapine and Zofran prescriptions sent to pharmacy.       Patient had pretreatment labs drawn today.    I spent 30 minutes caring for David on this date of service. This time includes time spent by me in the following activities: preparing for the visit, reviewing tests, counseling and educating the patient/family/caregiver, ordering medications, tests, or procedures, referring and communicating with other  health care professionals, documenting information in the medical record, and care coordination.     Siena Barcenas, APRN  10/17/23

## 2023-10-18 ENCOUNTER — TELEPHONE (OUTPATIENT)
Dept: ONCOLOGY | Facility: CLINIC | Age: 73
End: 2023-10-18
Payer: MEDICARE

## 2023-10-18 ENCOUNTER — HOSPITAL ENCOUNTER (OUTPATIENT)
Dept: INTERVENTIONAL RADIOLOGY/VASCULAR | Facility: HOSPITAL | Age: 73
Discharge: HOME | End: 2023-10-18
Admitting: RADIOLOGY
Payer: MEDICARE

## 2023-10-18 VITALS
DIASTOLIC BLOOD PRESSURE: 64 MMHG | TEMPERATURE: 97.6 F | WEIGHT: 190.6 LBS | BODY MASS INDEX: 25.81 KG/M2 | SYSTOLIC BLOOD PRESSURE: 111 MMHG | HEART RATE: 71 BPM | RESPIRATION RATE: 18 BRPM | OXYGEN SATURATION: 99 % | HEIGHT: 72 IN

## 2023-10-18 DIAGNOSIS — C34.91 BRONCHOGENIC CANCER OF RIGHT LUNG: ICD-10-CM

## 2023-10-18 LAB
BASOPHILS # BLD AUTO: 0.03 10*3/MM3 (ref 0–0.2)
BASOPHILS NFR BLD AUTO: 0.4 % (ref 0–1.5)
DEPRECATED RDW RBC AUTO: 46.9 FL (ref 37–54)
EOSINOPHIL # BLD AUTO: 0.1 10*3/MM3 (ref 0–0.4)
EOSINOPHIL NFR BLD AUTO: 1.3 % (ref 0.3–6.2)
ERYTHROCYTE [DISTWIDTH] IN BLOOD BY AUTOMATED COUNT: 13.7 % (ref 12.3–15.4)
HCT VFR BLD AUTO: 40.5 % (ref 37.5–51)
HGB BLD-MCNC: 13.6 G/DL (ref 13–17.7)
IMM GRANULOCYTES # BLD AUTO: 0.03 10*3/MM3 (ref 0–0.05)
IMM GRANULOCYTES NFR BLD AUTO: 0.4 % (ref 0–0.5)
INR PPP: 1.14 (ref 0.89–1.12)
LYMPHOCYTES # BLD AUTO: 1.77 10*3/MM3 (ref 0.7–3.1)
LYMPHOCYTES NFR BLD AUTO: 22.4 % (ref 19.6–45.3)
MCH RBC QN AUTO: 31.4 PG (ref 26.6–33)
MCHC RBC AUTO-ENTMCNC: 33.6 G/DL (ref 31.5–35.7)
MCV RBC AUTO: 93.5 FL (ref 79–97)
MONOCYTES # BLD AUTO: 0.71 10*3/MM3 (ref 0.1–0.9)
MONOCYTES NFR BLD AUTO: 9 % (ref 5–12)
NEUTROPHILS NFR BLD AUTO: 5.27 10*3/MM3 (ref 1.7–7)
NEUTROPHILS NFR BLD AUTO: 66.5 % (ref 42.7–76)
NRBC BLD AUTO-RTO: 0 /100 WBC (ref 0–0.2)
PLATELET # BLD AUTO: 247 10*3/MM3 (ref 140–450)
PMV BLD AUTO: 8.2 FL (ref 6–12)
PROTHROMBIN TIME: 14.7 SECONDS (ref 12.2–14.5)
RBC # BLD AUTO: 4.33 10*6/MM3 (ref 4.14–5.8)
WBC NRBC COR # BLD: 7.91 10*3/MM3 (ref 3.4–10.8)

## 2023-10-18 PROCEDURE — C1788 PORT, INDWELLING, IMP: HCPCS

## 2023-10-18 PROCEDURE — 85610 PROTHROMBIN TIME: CPT | Performed by: RADIOLOGY

## 2023-10-18 PROCEDURE — 25010000002 HEPARIN LOCK FLUSH PER 10 UNITS

## 2023-10-18 PROCEDURE — C1894 INTRO/SHEATH, NON-LASER: HCPCS

## 2023-10-18 PROCEDURE — 76937 US GUIDE VASCULAR ACCESS: CPT

## 2023-10-18 PROCEDURE — 25010000002 MIDAZOLAM PER 1 MG: Performed by: RADIOLOGY

## 2023-10-18 PROCEDURE — 25010000002 FENTANYL CITRATE (PF) 50 MCG/ML SOLUTION: Performed by: RADIOLOGY

## 2023-10-18 PROCEDURE — 85025 COMPLETE CBC W/AUTO DIFF WBC: CPT | Performed by: RADIOLOGY

## 2023-10-18 PROCEDURE — 99152 MOD SED SAME PHYS/QHP 5/>YRS: CPT

## 2023-10-18 PROCEDURE — 77001 FLUOROGUIDE FOR VEIN DEVICE: CPT

## 2023-10-18 RX ORDER — LIDOCAINE HYDROCHLORIDE AND EPINEPHRINE 10; 10 MG/ML; UG/ML
INJECTION, SOLUTION INFILTRATION; PERINEURAL
Status: COMPLETED
Start: 2023-10-18 | End: 2023-10-18

## 2023-10-18 RX ORDER — FENTANYL CITRATE 50 UG/ML
INJECTION, SOLUTION INTRAMUSCULAR; INTRAVENOUS
Status: DISPENSED
Start: 2023-10-18 | End: 2023-10-18

## 2023-10-18 RX ORDER — MIDAZOLAM HYDROCHLORIDE 1 MG/ML
INJECTION INTRAMUSCULAR; INTRAVENOUS
Status: DISPENSED
Start: 2023-10-18 | End: 2023-10-18

## 2023-10-18 RX ORDER — NITROGLYCERIN 0.4 MG/1
0.4 TABLET SUBLINGUAL
Status: DISCONTINUED | OUTPATIENT
Start: 2023-10-18 | End: 2023-10-19 | Stop reason: HOSPADM

## 2023-10-18 RX ORDER — HEPARIN SODIUM 200 [USP'U]/100ML
INJECTION, SOLUTION INTRAVENOUS
Status: DISPENSED
Start: 2023-10-18 | End: 2023-10-18

## 2023-10-18 RX ORDER — FENTANYL CITRATE 50 UG/ML
INJECTION, SOLUTION INTRAMUSCULAR; INTRAVENOUS AS NEEDED
Status: COMPLETED | OUTPATIENT
Start: 2023-10-18 | End: 2023-10-18

## 2023-10-18 RX ORDER — HEPARIN SODIUM (PORCINE) LOCK FLUSH IV SOLN 100 UNIT/ML 100 UNIT/ML
SOLUTION INTRAVENOUS
Status: COMPLETED
Start: 2023-10-18 | End: 2023-10-18

## 2023-10-18 RX ORDER — MIDAZOLAM HYDROCHLORIDE 1 MG/ML
INJECTION INTRAMUSCULAR; INTRAVENOUS AS NEEDED
Status: COMPLETED | OUTPATIENT
Start: 2023-10-18 | End: 2023-10-18

## 2023-10-18 RX ADMIN — MIDAZOLAM HYDROCHLORIDE 1 MG: 1 INJECTION, SOLUTION INTRAMUSCULAR; INTRAVENOUS at 08:52

## 2023-10-18 RX ADMIN — FENTANYL CITRATE 50 MCG: 50 INJECTION, SOLUTION INTRAMUSCULAR; INTRAVENOUS at 08:52

## 2023-10-18 RX ADMIN — LIDOCAINE HYDROCHLORIDE AND EPINEPHRINE 7 ML: 10; 10 INJECTION, SOLUTION INFILTRATION; PERINEURAL at 09:13

## 2023-10-18 RX ADMIN — HEPARIN 5 ML: 100 SYRINGE at 09:13

## 2023-10-18 RX ADMIN — FENTANYL CITRATE 50 MCG: 50 INJECTION, SOLUTION INTRAMUSCULAR; INTRAVENOUS at 08:55

## 2023-10-18 RX ADMIN — MIDAZOLAM HYDROCHLORIDE 1 MG: 1 INJECTION, SOLUTION INTRAMUSCULAR; INTRAVENOUS at 08:55

## 2023-10-18 NOTE — PRE-PROCEDURE NOTE
[facility]   Vascular Interventional Radiology  History & Physicial        Patient Name:David Barfield    : 1950    MRN: [id number]    Primary Care Physician: Amanda Smith PA    Referring Physician: Neetu Ashley MD     Date of admission: 10/18/2023    Subjective     Reason for Consult: Port Pl    History of Present Illness     David Barfield is a 73 y.o. male referred to IR as noted above.      Active Hospital Problems:  There are no active hospital problems to display for this patient.      Personal History     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    Diabetes mellitus Borderline    Emphysema/COPD     Erectile disorder     GERD (gastroesophageal reflux disease)     Hyperlipidemia     Hypertension 2019    Lung nodule     Mumps     Mumps     Pruritus     after bath    Slow to wake up after anesthesia     Wears dentures     upper only    Wears hearing aid in both ears     usually only wears right       Past Surgical History:   Procedure Laterality Date    BONE BIOPSY      broken bone surgery in his face    BRONCHOSCOPY WITH ION ROBOTIC ASSIST N/A 9/15/2023    Procedure: BRONCHOSCOPY NAVIGATION WITH ENDOBRONCHIAL ULTRASOUND AND ION ROBOT;  Surgeon: Octaviano Sampson MD;  Location:  [facility];  Service: Robotics - Pulmonary;  Laterality: N/A;  ion #6 - 0032  - 0015  Cath guide 0061    EBUS balloon removed and intact    CARDIAC ELECTROPHYSIOLOGY PROCEDURE N/A 2021    Procedure: Pacemaker DC new;  Surgeon: Kayy Box MD;  Location:  [facility] CATH INVASIVE LOCATION;  Service: Cardiology;  Laterality: N/A;    FACIAL FRACTURE SURGERY      INSERT / REPLACE / REMOVE PACEMAKER  2021       Family History: His family history includes Aneurysm in his mother; Dementia in his father; Heart disease in his paternal grandmother; Hypertension in his paternal  "grandfather; Leukemia in his sister.     Social History: He  reports that he has been smoking cigarettes. He started smoking about 55 years ago. He has a 53.00 pack-year smoking history. He has never used smokeless tobacco. He reports that he does not drink alcohol and does not use drugs.    Home Medications:  Fluticasone-Umeclidin-Vilant, OLANZapine, albuterol sulfate HFA, fexofenadine, fluticasone, lidocaine-prilocaine, lisinopril, ondansetron, pravastatin, and sildenafil    Current Medications:    heparin (porcine)    heparin    lidocaine 1% - EPINEPHrine [id number]    nitroglycerin     Allergies:  No Known Allergies    Review of Systems    IR Procedure pertinent significant findings are mentioned in the PMH and PSH above.    Objective     Visit Vitals  /81 (BP Location: Right arm, Patient Position: Lying)   Pulse 71   Temp 97.6 °F (36.4 °C) (Tympanic)   Resp 18   Ht 182.9 cm (72\")   Wt 86.5 kg (190 lb 9.6 oz)   SpO2 97%   BMI 25.85 kg/m²        Physical Exam    A&Ox3.   Able to communicate  No Apparent Distress  Average physique   CVS: VS as noted. Chart reviewed. Stable for the procedure.  Respiratory: Non labored breathing. No signs of respiratory compromise.    Result Review      I have personally reviewed the results from the time of this admission to 10/18/2023 08:24 EDT and agree with these findings.  [x]  Laboratory  []  Microbiology  [x]  Radiology  []  EKG/Telemetry   []  Cardiology/Vascular   []  Pathology  []  Old records  []  Other:    Most notable findings include: As noted:    Results from last 7 days   Lab Units 10/18/23  0722 10/17/23  1209   INR  1.14*  --    WBC 10*3/mm3 7.91 8.53   HEMOGLOBIN g/dL 13.6 13.9   HEMATOCRIT % 40.5 42.1   PLATELETS 10*3/mm3 247 265       Results from last 7 days   Lab Units 10/17/23  1209   SODIUM mmol/L 133*   POTASSIUM mmol/L 4.5   CHLORIDE mmol/L 98   CO2 mmol/L 27.0   BUN mg/dL 8   CREATININE mg/dL 1.10   EGFR mL/min/1.73 70.4   GLUCOSE mg/dL 87         " "  Lab 10/17/23  1209   TOTAL PROTEIN 7.9   ALBUMIN 4.0   GLOBULIN 3.9   ALT (SGPT) 11   AST (SGOT) 14   BILIRUBIN 0.3   ALK PHOS 98       Estimated Creatinine Clearance: 72.1 mL/min (by C-G formula based on SCr of 1.1 mg/dL).   Creatinine   Date Value Ref Range Status   10/17/2023 1.10 0.76 - 1.27 mg/dL Final       COVID19   Date Value Ref Range Status   10/21/2022 Not Detected Not Detected - Ref. Range Final        No results found for: \"PREGTESTUR\", \"PREGSERUM\", \"HCG\", \"HCGQUANT\"     ASA SCALE ASSESSMENT (applicable ONLY if sedation planned):   3     MALLAMPATI CLASSIFICATION (applicable ONLY if sedation planned):   2    Assessment / Plan     David Barfield is a 73 y.o. male referred to the IR service with above problem.    Plan:   As above.    Notice: The note was created before the performance of the procedure. It might have been left in the pending status and signed off after the procedure. The time stamp on the note may be misleading.    Kip Gutiérrez MD   Vascular Interventional Radiology  10/18/23   8:24 AM EDT     "

## 2023-10-18 NOTE — POST-PROCEDURE NOTE
The following procedure was performed: Port pl    Please see corresponding Radiology report for in detail procedural information. The Radiology report will be dictated shortly, if not done so already. Please see the IR RN note for the information regarding medicines administered if any, jesse-procedural vitals and I/O information.

## 2023-10-18 NOTE — NURSING NOTE
Image guided 8 Bengali Smartport placed to  [facility] by Dr Gutiérrez in Interventional Radiology. Dsg to site per protocol. Patient tolerated procedure well. Patient given 100 mcg Fentanyl & 2 mg Versed with a sedation time of 18 minutes. Report called to anthony Chang in [place].

## 2023-10-18 NOTE — TELEPHONE ENCOUNTER
Caller: ISAÍAS ANDERSON    Relationship: Emergency Contact    Best call back number: [phone]     What is the best time to reach you: ASAP    Who are you requesting to speak with (clinical staff, provider,  specific staff member): CLINICAL    What was the call regarding: PT  NEEDS TO KNOW IF HE CAN EAT AND DRINK COFFEE BEFORE HIS CHEMO TREATMENT TOMORROW MORNING.  HUB UNABLE TO WARM TRANSFER, PLEASE CALL ISAÍAS TO ADVISE.

## 2023-10-18 NOTE — DISCHARGE INSTRUCTIONS
Avoid any strenuous activities, pulling, tugging, straining or lifting anything over 10 pounds for the next     You may remove the dressing tomorrow and shower tomorrow; but you will need to avoid tub baths or any situation where your are submersed in water for the next 4 or 5 days to avoid the risk of infection.     DO NOT DRIVE ON THE DAY OF YOUR PROCEDURE.    If you have any problems or concern please contact your physician's office.      SEEK IMMEDIATE MEDICAL ATTENTION FOR ANY

## 2023-10-19 ENCOUNTER — OFFICE VISIT (OUTPATIENT)
Dept: ONCOLOGY | Facility: CLINIC | Age: 73
End: 2023-10-19
Payer: MEDICARE

## 2023-10-19 VITALS
DIASTOLIC BLOOD PRESSURE: 74 MMHG | OXYGEN SATURATION: 100 % | HEIGHT: 72 IN | SYSTOLIC BLOOD PRESSURE: 129 MMHG | HEART RATE: 84 BPM | TEMPERATURE: 97.3 F | RESPIRATION RATE: 20 BRPM | BODY MASS INDEX: 26.14 KG/M2 | WEIGHT: 193 LBS

## 2023-10-19 DIAGNOSIS — C34.31 MALIGNANT NEOPLASM OF LOWER LOBE OF RIGHT LUNG: Primary | ICD-10-CM

## 2023-10-19 DIAGNOSIS — L03.313 CELLULITIS OF CHEST WALL: ICD-10-CM

## 2023-10-19 DIAGNOSIS — Z51.11 CHEMOTHERAPY MANAGEMENT, ENCOUNTER FOR: ICD-10-CM

## 2023-10-19 RX ORDER — CEPHALEXIN 500 MG/1
500 CAPSULE ORAL 2 TIMES DAILY
Qty: 14 CAPSULE | Refills: 0 | Status: SHIPPED | OUTPATIENT
Start: 2023-10-19

## 2023-10-19 NOTE — PROGRESS NOTES
Hematology and Oncology [place]  Office number [phone]    Fax number [phone]     Follow up     Date: 10/19/2023     Patient Name: David Barfield  MRN: [id number]  : 1950    Referring Physician: Dr. Sampson    Chief Complaint: Nonsmall cell lung cancer    Cancer Staging:  Cancer Staging   Stage IIIA (cT2b, cN2, cM0)    History of Present Illness: David Barfield is a pleasant 73 y.o. male who presents today for evaluation of NSCLC. He has a 50 pack year smoking history.    He has a history of a PET avid subpleural RLL lung [facility] [place] [facility] [place] This had an SUV of 9.8 on PET  in association with increased mediastinal LN uptake with SUV around 3. Imaging was felt secondary to osteophyte fibrosis. He did undergo bronchoscopy 2020 with negative cytology. The RLL lung nodule was not felt amenable to bronchoscopy biopsy.    CT lung screen 2023 showed:      PET CT showed mass in the RLL intensely SUV avid, max 17. Mediastinal LN not hypermetabolic above baseline.     Right lower lobe robotic transbronchial biopsy and cytology on 9/15/2023 showed benign findings. Cultures including AFT and fungal were negative.  Station 11L, Station 4L LN negative  Station 7 LN showed NSCLCa (positive for cytokeratin 7, p63, and TTF-1 with no significant staining for p40 or Napsin. The staining pattern raises the possibility of mixed adenocarcinoma and squamous cell carcinoma differentiation in this tumor). PDL1 negative.    MRI brain was negative.    He is here for an opinion regarding management of newly diagnosed lung cancer.    He has a history of sick sinus syndrome s/p PPM and moderate COPD. He describes only mild physical limitations from this. For example he recently walked 1.5 miles at [employer]. At home, he climbs 17 steps without issue. He does sit down with long activities.     Treatment history:  Carbo, taxol, nivo, C1 planned  10/19/23    Interval history:   He is here for chemotherapy initiation.   Chalazion right upper lid x 1 week; Has been redoing [employer] truck and had some dust in it, was sanding. No vision changes or scleral injection, or foreign body sensation.   Had dry heaves diaphoresis when his SO pulled bandaid off his port incision this AM  Redness over port, new. No fever.       Past Medical History:   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    Diabetes mellitus Borderline    Emphysema/COPD     Erectile disorder     GERD (gastroesophageal reflux disease)     Hyperlipidemia     Hypertension 2019    Lung nodule     Mumps     Mumps     Pruritus     after bath    Slow to wake up after anesthesia     Wears dentures     upper only    Wears hearing aid in both ears     usually only wears right   No personal history of myocardial infarction, cerebrovascular event, or venous thromboembolism.  No autoimmune diseases.   No prior cscope, mailed cologuard recently awaiting results.      Past Surgical History:   Past Surgical History:   Procedure Laterality Date    BONE BIOPSY      broken bone surgery in his face    BRONCHOSCOPY WITH ION ROBOTIC ASSIST N/A 9/15/2023    Procedure: BRONCHOSCOPY NAVIGATION WITH ENDOBRONCHIAL ULTRASOUND AND ION ROBOT;  Surgeon: Octaviano Sampson MD;  Location:  [facility] ENDOSCOPY;  Service: Robotics - Pulmonary;  Laterality: N/A;  ion #6 - 0032  - 0015  Cath guide 0061    EBUS balloon removed and intact    CARDIAC ELECTROPHYSIOLOGY PROCEDURE N/A 08/17/2021    Procedure: Pacemaker DC new;  Surgeon: Kayy Box MD;  Location:  [facility] CATH INVASIVE LOCATION;  Service: Cardiology;  Laterality: N/A;    FACIAL FRACTURE SURGERY      INSERT / REPLACE / REMOVE PACEMAKER  August 17, 2021       Family History:   Family History   Problem Relation Age of Onset    Aneurysm Mother         brain     Dementia Father     Leukemia Sister     Hypertension Paternal Grandfather     Heart disease Paternal Grandmother    Sister leukemia at age 21  No other malignancy    Social History:   Social History     Socioeconomic History    Marital status: Single   Tobacco Use    Smoking status: Every Day     Packs/day: 1.00     Years: 53.00     Additional pack years: 0.00     Total pack years: 53.00     Types: Cigarettes     Start date: 1/1/1968    Smokeless tobacco: Never    Tobacco comments:     Still smoke   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[place], [place]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    Medications:     Current Outpatient Medications:     albuterol sulfate  (90 Base) MCG/ACT inhaler, Inhale 2 puffs Every 4 (Four) Hours As Needed for Wheezing., Disp: 18 g, Rfl: 11    fexofenadine (ALLEGRA) 180 MG tablet, Take 1 tablet by mouth As Needed., Disp: , Rfl:     fluticasone (VERAMYST) 27.5 MCG/SPRAY nasal spray, 2 sprays into the nostril(s) as directed by provider 1 (One) Time As Needed for Rhinitis or Allergies., Disp: , Rfl:     Fluticasone-Umeclidin-Vilant (Trelegy Ellipta) 100-62.5-25 MCG/ACT inhaler, Inhale 1 puff Daily., Disp: 3 each, Rfl: 3    lidocaine-prilocaine (EMLA) 2.5-2.5 % cream, Apply 1 application  topically to the appropriate area as directed As Needed (45-60 minutes prior to port access.  Cover with saran/plastic wrap.)., Disp: 30 g, Rfl: 3    lisinopril (PRINIVIL,ZESTRIL) 2.5 MG tablet, Take 1 tablet by mouth As Needed (elevated blood pressure). Take 1/2 tablet po prn (Patient taking differently: Take 1 tablet by mouth As Needed (elevated blood pressure systolic over 140). Take 1/2 tablet po prn), Disp: 30 tablet, Rfl: 1    OLANZapine (zyPREXA) 5 MG tablet, Take 1 tablet by mouth Every Night. Take nightly x 4 starting night of  "chemotherapy., Disp: 4 tablet, Rfl: 2    ondansetron (ZOFRAN) 8 MG tablet, Take 1 tablet by mouth 3 (Three) Times a Day As Needed for Nausea or Vomiting., Disp: 30 tablet, Rfl: 2    pravastatin (PRAVACHOL) 80 MG tablet, Take 1 tablet by mouth Every Night., Disp: 30 tablet, Rfl: 11    sildenafil (VIAGRA) 100 MG tablet, Take 0.5 tablets by mouth Daily As Needed for Erectile Dysfunction., Disp: , Rfl:   No current facility-administered medications for this visit.    Allergies:   No Known Allergies    Objective     Vital Signs:   Vitals:    10/19/23 0750   BP: 129/74   Pulse: 84   Resp: 20   Temp: 97.3 °F (36.3 °C)   TempSrc: Infrared   SpO2: 100%   Weight: 87.5 kg (193 lb)   Height: 182.9 cm (72.01\")   PainSc: 0-No pain    Body mass index is 26.17 kg/m².   Pain Score    10/19/23 0750   PainSc: 0-No pain       ECOG Performance Status: 1 - Symptomatic but completely ambulatory    Physical Exam:   General: No acute distress. Well appearing   HEENT: Normocephalic, atraumatic. Sclera anicteric.   Neck: supple, no adenopathy.   Cardiovascular: regular rate and rhythm. No murmurs.   Respiratory: Normal rate. Clear to auscultation bilaterally  Abdomen: Soft, nontender, non distended with normoactive bowel sounds  Lymph: no cervical, supraclavicular or axillary adenopathy  Neuro: Alert and oriented x 3. No focal deficits.   Ext: Symmetric, no swelling.   Skin: has blanching erythema centering over the port. There is a separate linear area of mild erythema at site of prior bandage concerning for tape reaction.    Laboratory/Imaging Reviewed:   Hospital Outpatient Visit on 10/18/2023   Component Date Value Ref Range Status    Protime 10/18/2023 14.7 (H)  12.2 - 14.5 Seconds Final    INR 10/18/2023 1.14 (H)  0.89 - 1.12 Final    WBC 10/18/2023 7.91  3.40 - 10.80 10*3/mm3 Final    RBC 10/18/2023 4.33  4.14 - 5.80 10*6/mm3 Final    Hemoglobin 10/18/2023 13.6  13.0 - 17.7 g/dL Final    Hematocrit 10/18/2023 40.5  37.5 - 51.0 % Final "    MCV 10/18/2023 93.5  79.0 - 97.0 fL Final    MCH 10/18/2023 31.4  26.6 - 33.0 pg Final    MCHC 10/18/2023 33.6  31.5 - 35.7 g/dL Final    RDW 10/18/2023 13.7  12.3 - 15.4 % Final    RDW-SD 10/18/2023 46.9  37.0 - 54.0 fl Final    MPV 10/18/2023 8.2  6.0 - 12.0 fL Final    Platelets 10/18/2023 247  140 - 450 10*3/mm3 Final    Neutrophil % 10/18/2023 66.5  42.7 - 76.0 % Final    Lymphocyte % 10/18/2023 22.4  19.6 - 45.3 % Final    Monocyte % 10/18/2023 9.0  5.0 - 12.0 % Final    Eosinophil % 10/18/2023 1.3  0.3 - 6.2 % Final    Basophil % 10/18/2023 0.4  0.0 - 1.5 % Final    Immature Grans % 10/18/2023 0.4  0.0 - 0.5 % Final    Neutrophils, Absolute 10/18/2023 5.27  1.70 - 7.00 10*3/mm3 Final    Lymphocytes, Absolute 10/18/2023 1.77  0.70 - 3.10 10*3/mm3 Final    Monocytes, Absolute 10/18/2023 0.71  0.10 - 0.90 10*3/mm3 Final    Eosinophils, Absolute 10/18/2023 0.10  0.00 - 0.40 10*3/mm3 Final    Basophils, Absolute 10/18/2023 0.03  0.00 - 0.20 10*3/mm3 Final    Immature Grans, Absolute 10/18/2023 0.03  0.00 - 0.05 10*3/mm3 Final    nRBC 10/18/2023 0.0  0.0 - 0.2 /100 WBC Final   Lab on 10/17/2023   Component Date Value Ref Range Status    Glucose 10/17/2023 87  65 - 99 mg/dL Final    BUN 10/17/2023 8  8 - 23 mg/dL Final    Creatinine 10/17/2023 1.10  0.76 - 1.27 mg/dL Final    Sodium 10/17/2023 133 (L)  136 - 145 mmol/L Final    Potassium 10/17/2023 4.5  3.5 - 5.2 mmol/L Final    Slight hemolysis detected by analyzer. Results may be affected.    Chloride 10/17/2023 98  98 - 107 mmol/L Final    CO2 10/17/2023 27.0  22.0 - 29.0 mmol/L Final    Calcium 10/17/2023 9.5  8.6 - 10.5 mg/dL Final    Total Protein 10/17/2023 7.9  6.0 - 8.5 g/dL Final    Albumin 10/17/2023 4.0  3.5 - 5.2 g/dL Final    ALT (SGPT) 10/17/2023 11  1 - 41 U/L Final    AST (SGOT) 10/17/2023 14  1 - 40 U/L Final    Alkaline Phosphatase 10/17/2023 98  39 - 117 U/L Final    Total Bilirubin 10/17/2023 0.3  0.0 - 1.2 mg/dL Final    Globulin 10/17/2023  3.9  gm/dL Final    Calculated Result    A/G Ratio 10/17/2023 1.0  g/dL Final    BUN/Creatinine Ratio 10/17/2023 7.3  7.0 - 25.0 Final    Anion Gap 10/17/2023 8.0  5.0 - 15.0 mmol/L Final    eGFR 10/17/2023 70.4  >60.0 mL/min/1.73 Final    TSH 10/17/2023 1.810  0.270 - 4.200 uIU/mL Final    Free T4 10/17/2023 1.13  0.93 - 1.70 ng/dL Final    WBC 10/17/2023 8.53  3.40 - 10.80 10*3/mm3 Final    RBC 10/17/2023 4.43  4.14 - 5.80 10*6/mm3 Final    Hemoglobin 10/17/2023 13.9  13.0 - 17.7 g/dL Final    Hematocrit 10/17/2023 42.1  37.5 - 51.0 % Final    MCV 10/17/2023 95.0  79.0 - 97.0 fL Final    MCH 10/17/2023 31.4  26.6 - 33.0 pg Final    MCHC 10/17/2023 33.0  31.5 - 35.7 g/dL Final    RDW 10/17/2023 13.5  12.3 - 15.4 % Final    RDW-SD 10/17/2023 48.3  37.0 - 54.0 fl Final    MPV 10/17/2023 8.3  6.0 - 12.0 fL Final    Platelets 10/17/2023 265  140 - 450 10*3/mm3 Final    Neutrophil % 10/17/2023 66.1  42.7 - 76.0 % Final    Lymphocyte % 10/17/2023 23.2  19.6 - 45.3 % Final    Monocyte % 10/17/2023 9.6  5.0 - 12.0 % Final    Eosinophil % 10/17/2023 0.7  0.3 - 6.2 % Final    Basophil % 10/17/2023 0.2  0.0 - 1.5 % Final    Immature Grans % 10/17/2023 0.2  0.0 - 0.5 % Final    Neutrophils, Absolute 10/17/2023 5.63  1.70 - 7.00 10*3/mm3 Final    Lymphocytes, Absolute 10/17/2023 1.98  0.70 - 3.10 10*3/mm3 Final    Monocytes, Absolute 10/17/2023 0.82  0.10 - 0.90 10*3/mm3 Final    Eosinophils, Absolute 10/17/2023 0.06  0.00 - 0.40 10*3/mm3 Final    Basophils, Absolute 10/17/2023 0.02  0.00 - 0.20 10*3/mm3 Final    Immature Grans, Absolute 10/17/2023 0.02  0.00 - 0.05 10*3/mm3 Final       IR Port Placement    Result Date: 10/18/2023  Narrative: IR PORT PLACEMENT  History: lung cancer  : Kip Gutiérrez MD.  Modality: Sonography and fluoroscopy  DOSE REDUCTION: The examination was performed according to departmental dose-optimization program which includes automated exposure control, adjustment of the mA and/or kV.  Fluoro time: 0.2 minutes Radiation dose: 1.4 mGy air Kerma.  SEDATION: Moderate sedation was administered. 2 milligram of Versed and 100 micrograms of fentanyl IV was used for moderate sedation. Total intra service time of sedation was 18 minutes. The sedation was administered and the patient's vital signs monitored throughout the procedure and recorded in the patient's medical record by the nurse under my direct supervision.  Anesthesia: Lidocaine 1% with epinephrine, local infiltration Medicines: None      Estimated blood loss:  < 5 cc.        Technique: A thorough discussion of the risks, benefits, and alternatives of the procedure, and if applicable, moderate sedation, was carried out with the patient. They were encouraged to ask any questions. Any questions were answered. They verbalized understanding. A written informed consent was then signed.  A multi-component timeout was performed prior to starting the procedure using the departmental protocol. The procedure room personnel used personal protective equipment. The operators used sterile gloves and if indicated, sterile gowns. The surgical site was prepped with chlorhexidine gluconate  and draped in the maximal applicable sterile fashion. A preliminary ultrasonogram was performed of the neck that revealed a patent and compressible internal jugular vein. Pertinent ultrasound images were stored in the PACS for documentation. Using aseptic precautions, real-time ultrasound guidance, the internal jugular vein was accessed with a micropuncture needle after local anesthetic infiltration. A 018 guidewire was advanced into the central venous system under fluoroscopic guidance. Over the wire a micropuncture sheath was placed. Through the micropuncture sheath, a 035 wire was advanced into the venous system under fluoroscopic guidance. Over the wire a peel-away sheath was placed. After local anesthesia, using sharp and blunt dissection, a reservoir pocket of appropriate  size was created in the infra clavicular chest wall. The port catheter was connected to the port reservoir. The catheter was tunneled to the venotomy site using a  tunneling device. The the reservoir was placed in the reservoir pocket. The catheter was trimmed to an appropriate length. The catheter was advanced into the venous system through the peel-away sheath which was removed. The port aspirated and flushed well and was terminally packed with heparin 100 units per cc, total 500 units. The port reservoir was irrigated with saline. The incision was closed in layers using absorbable suture and Dermabond. The venotomy site was closed using Dermabond. An aseptic dressing was applied using the protocol for Dermabond. The patient was transferred to the recovery area and was discharged from the department in stable condition.  Complications: None immediate.    Findings: Patent and compressible internal jugular vein. Final image shows the shaggy catheter to be in good position with the catheter tip at the cavoatrial junction/right atrium, an excellent position for use. There is no immediate complication.      Impression: Impression:    Successful ultrasound and fluoroscopic guided left internal jugular vein route single lumen Port-A-Cath placement as described above. There is a right-sided pacemaker in place. Thank you for the opportunity to assist in the care of your patient. Electronically Signed: Kip Gutiérrez MD  10/18/2023 3:50 PM EDT  Workstation ID: [id number]    MRI Brain With & Without Contrast    Result Date: 9/28/2023  Narrative: MRI BRAIN W WO CONTRAST Date of Exam: 9/28/2023 8:22 AM EDT Indication: Non-small cell lung cancer (NSCLC), staging.  Comparison: None available. Technique:  Routine multiplanar/multisequence sequence images of the brain were obtained before and after the uneventful administration of 18 mL Multihance. Findings: No acute infarct is present on diffusion weighted sequences. Midline  structures are normal and the craniocervical junction appears satisfactory. Mild age-related changes are present with some generalized volume loss and few areas of subcortical white matter T2 hyperintensity, likely to represent typical sequela of microvascular ischemic change. There is otherwise no evidence of intracranial hemorrhage, mass or mass effect. There is no abnormal brain parenchymal enhancement. The ventricles are normal in size and configuration, accounting for surrounding volume loss. The orbits are normal. The paranasal sinuses are clear.     Impression: Impression: No evidence of intracranial metastatic disease. Mild to moderate typical age-related changes are present as above. There is otherwise no evidence of acute ischemia, hemorrhage, mass or abnormal enhancement. Electronically Signed: Alexandre Matos MD  9/28/2023 12:40 PM EDT  Workstation ID: [id number]     Procedures    Bronch 6/2020  Findings:       An EBUS bronchoscope was advanced through the endotracheal tube under        general anesthesia. A comprehensive EBUS survey of all available lymph        node stations revealed lymphadenopathy with benign sonographic features        in stations 11L (9 mm), 4L (8 mm), 7 (15 mm), 4R (11 mm), and 11R (10        mm). EBUS-TBNA x 4 passes (at least) was done at each of those stations        in that sequence with the Olympus ViziShot 21G and 22G needles.       We then withdrew the EBUS scope and inserted a therapeutic scope into        the airway. A thorough airway exam to the first subsegmental level was        unremarkable without endobronchial lesions or secretions. A BAL was        obtained from the right lower lobe superior segment (RB6) (90 ml        instilled, 18 ml of cloudy fluid returned). Adequate hemostasis was        confirmed prior to withdrawing the scope. Patient tolerated procedure.  Impression:           Abnormal CT scan of chest                        1. Successful endobronchial  ultrasound bronchoscopy                         with fine needle aspiration.                        2. Mediastinal/hilar lymphadenopathy with benign                         sonographic features in stations 11L, 4L, 7, 4R and 11R.                        3. EBUS-TBNA samples from stations 11L, 4L, 7, 4R and                         11R.                        4. Normal airway anatomy without active bleeding,                         endobronchial lesions or secretions.                        5. BAL from RLL     11R Lymph Node, (EBUS) Fine Needle Aspiration:  - Negative for malignancy  - Abundant lymphoid tissue and histiocytes with anthracotic pigments.     4R Lymph Node, (EBUS) Fine Needle Aspiration:  - Negative for malignancy.  - Abundant lymphoid tissue present.     Subcarinal Lymph Node, (EBUS) Fine Needle Aspiration:  - Negative for malignancy  - Abundant lymphoid tissue present.     4L Lymph Node, (EBUS) Fine Needle Aspiration:  - Negative for malignancy  - Abundant lymphoid tissue.     11L Lymph Node, (EBUS) Fine Needle Aspiration:  - Negative for malignancy.  - Abundant lymphoid tissue and histiocytes with anthracotic pigments.     Bronchoalveolar Lavage, RLL (ThinPrep only):  NO MALIGNANT CELLS IDENTIFIED  Benign respiratory epithelial cells and pulmonary macrophages       Microbiology Results     Date and Time Order Name Sensitivity Status Organisms Specimen ID Source     6/29/2020 1030 Fungus Culture and Smear   Preliminary   [id number] Lung     6/29/2020 1030 BAL/Brush Culture plus Stain, Semiquant.   Final   [id number] Lung     6/29/2020 1030 Acid Fast Culture Plus Stain   Preliminary   [id number] Lung     Assessment / Plan      Assessment/Plan:     Nonsmall cell lung cancer of the RLL, Stage IIIA  Chemo immunotherapy monitoring  -He has an enlarging RLL nodule with SUV of 17. Despite negative transbronchial biopsy, he was found to have N2 disease with a positive level 7 LN. We discussed options for definitive  treatment to include induction chemo immunotherapy followed by surgical resection, chemoradiation followed by surgical resection, or definitive chemoradiation. We discussed differences in schedules and side effects. He has no contraindications to immunotherapy and I would consider nivolumab + chemotherapy induction.  His case was reviewed at multidisciplinary tumor board including thoracic surgery with Dr. Garza.  He was felt to be a good candidate for neoadjuvant chemo immunotherapy followed by resection assessment.  -I recommended nivolumab, carboplatin, paclitaxel x3 cycles followed by repeat imaging.  He is awaiting scheduling for CT surgery opinion, but given surgical input we will proceed with therapy initiation.  -Tempus reviewed and shows TP53 mutatation  -Labs reviewed and adequate for treatment. However will hold treatment initiation for possible port site cellulitis.  -Brain MRI negative    3. Port site cellulitis  Keflex    4. Chalazion  -Warm compresses  -Will be on keflex for port cellulitis which may help as well.       -RTC 1 week    Follow Up:   1 week treatment start     Neetu Ashley MD  Hematology and Oncology     I have spent a total of 40 min on reviewing test results/preparing to see patient, counseling patient, performing medically appropriate exam, discussion with specialists, coordination of care, placement of orders and documenting clinical information in the electronic or other health record

## 2023-10-20 ENCOUNTER — TELEPHONE (OUTPATIENT)
Dept: ONCOLOGY | Facility: CLINIC | Age: 73
End: 2023-10-20
Payer: MEDICARE

## 2023-10-20 NOTE — TELEPHONE ENCOUNTER
Return call to Balaji.  Balaji reports she thinks the area on David' chest is a little worse today.  David in the background stating it isn't worse.  Asked to speak with David.  David states it may be a little more red where they pulled the transparent film off the surgical site.  Recommended to continue antibiotics and notify our office if worsening.

## 2023-10-23 ENCOUNTER — HOSPITAL ENCOUNTER (OUTPATIENT)
Dept: PULMONOLOGY | Facility: HOSPITAL | Age: 73
Discharge: HOME | End: 2023-10-23
Payer: MEDICARE

## 2023-10-23 ENCOUNTER — HOSPITAL ENCOUNTER (OUTPATIENT)
Dept: ONCOLOGY | Facility: HOSPITAL | Age: 73
Discharge: HOME | End: 2023-10-23
Payer: MEDICARE

## 2023-10-23 ENCOUNTER — OFFICE VISIT (OUTPATIENT)
Dept: CARDIAC SURGERY | Facility: CLINIC | Age: 73
End: 2023-10-23
Payer: MEDICARE

## 2023-10-23 VITALS
BODY MASS INDEX: 26.01 KG/M2 | WEIGHT: 192 LBS | HEART RATE: 77 BPM | TEMPERATURE: 97.2 F | SYSTOLIC BLOOD PRESSURE: 138 MMHG | RESPIRATION RATE: 18 BRPM | DIASTOLIC BLOOD PRESSURE: 76 MMHG | HEIGHT: 72 IN

## 2023-10-23 VITALS
HEART RATE: 83 BPM | OXYGEN SATURATION: 98 % | DIASTOLIC BLOOD PRESSURE: 72 MMHG | HEIGHT: 72 IN | BODY MASS INDEX: 25.9 KG/M2 | SYSTOLIC BLOOD PRESSURE: 130 MMHG | TEMPERATURE: 98.3 F | WEIGHT: 191.2 LBS

## 2023-10-23 DIAGNOSIS — C34.31 MALIGNANT NEOPLASM OF LOWER LOBE OF RIGHT LUNG: Primary | ICD-10-CM

## 2023-10-23 LAB
ALBUMIN SERPL-MCNC: 4 G/DL (ref 3.5–5.2)
ALBUMIN/GLOB SERPL: 1 G/DL
ALP SERPL-CCNC: 98 U/L (ref 39–117)
ALT SERPL W P-5'-P-CCNC: 9 U/L (ref 1–41)
ANION GAP SERPL CALCULATED.3IONS-SCNC: 10 MMOL/L (ref 5–15)
AST SERPL-CCNC: 15 U/L (ref 1–40)
BASOPHILS # BLD AUTO: 0.02 10*3/MM3 (ref 0–0.2)
BASOPHILS NFR BLD AUTO: 0.2 % (ref 0–1.5)
BILIRUB SERPL-MCNC: 0.4 MG/DL (ref 0–1.2)
BUN SERPL-MCNC: 10 MG/DL (ref 8–23)
BUN/CREAT SERPL: 10.4 (ref 7–25)
CALCIUM SPEC-SCNC: 9.7 MG/DL (ref 8.6–10.5)
CHLORIDE SERPL-SCNC: 101 MMOL/L (ref 98–107)
CO2 SERPL-SCNC: 27 MMOL/L (ref 22–29)
CREAT SERPL-MCNC: 0.96 MG/DL (ref 0.76–1.27)
DEPRECATED RDW RBC AUTO: 49.1 FL (ref 37–54)
EGFRCR SERPLBLD CKD-EPI 2021: 82.9 ML/MIN/1.73
EOSINOPHIL # BLD AUTO: 0.14 10*3/MM3 (ref 0–0.4)
EOSINOPHIL NFR BLD AUTO: 1.2 % (ref 0.3–6.2)
ERYTHROCYTE [DISTWIDTH] IN BLOOD BY AUTOMATED COUNT: 13.8 % (ref 12.3–15.4)
GLOBULIN UR ELPH-MCNC: 4.2 GM/DL
GLUCOSE SERPL-MCNC: 162 MG/DL (ref 65–99)
HCT VFR BLD AUTO: 42.1 % (ref 37.5–51)
HGB BLD-MCNC: 13.9 G/DL (ref 13–17.7)
IMM GRANULOCYTES # BLD AUTO: 0.02 10*3/MM3 (ref 0–0.05)
IMM GRANULOCYTES NFR BLD AUTO: 0.2 % (ref 0–0.5)
LYMPHOCYTES # BLD AUTO: 2.18 10*3/MM3 (ref 0.7–3.1)
LYMPHOCYTES NFR BLD AUTO: 19.4 % (ref 19.6–45.3)
MCH RBC QN AUTO: 31.7 PG (ref 26.6–33)
MCHC RBC AUTO-ENTMCNC: 33 G/DL (ref 31.5–35.7)
MCV RBC AUTO: 96.1 FL (ref 79–97)
MONOCYTES # BLD AUTO: 0.42 10*3/MM3 (ref 0.1–0.9)
MONOCYTES NFR BLD AUTO: 3.7 % (ref 5–12)
NEUTROPHILS NFR BLD AUTO: 75.3 % (ref 42.7–76)
NEUTROPHILS NFR BLD AUTO: 8.47 10*3/MM3 (ref 1.7–7)
PLATELET # BLD AUTO: 225 10*3/MM3 (ref 140–450)
PMV BLD AUTO: 8.5 FL (ref 6–12)
POTASSIUM SERPL-SCNC: 4.3 MMOL/L (ref 3.5–5.2)
PROT SERPL-MCNC: 8.2 G/DL (ref 6–8.5)
RBC # BLD AUTO: 4.38 10*6/MM3 (ref 4.14–5.8)
SODIUM SERPL-SCNC: 138 MMOL/L (ref 136–145)
WBC NRBC COR # BLD: 11.25 10*3/MM3 (ref 3.4–10.8)

## 2023-10-23 PROCEDURE — 94729 DIFFUSING CAPACITY: CPT

## 2023-10-23 PROCEDURE — 94726 PLETHYSMOGRAPHY LUNG VOLUMES: CPT

## 2023-10-23 PROCEDURE — 94640 AIRWAY INHALATION TREATMENT: CPT

## 2023-10-23 PROCEDURE — 99205 OFFICE O/P NEW HI 60 MIN: CPT | Performed by: THORACIC SURGERY (CARDIOTHORACIC VASCULAR SURGERY)

## 2023-10-23 PROCEDURE — 85025 COMPLETE CBC W/AUTO DIFF WBC: CPT | Performed by: INTERNAL MEDICINE

## 2023-10-23 PROCEDURE — 25010000002 HEPARIN LOCK FLUSH PER 10 UNITS: Performed by: INTERNAL MEDICINE

## 2023-10-23 PROCEDURE — 80053 COMPREHEN METABOLIC PANEL: CPT | Performed by: INTERNAL MEDICINE

## 2023-10-23 PROCEDURE — 36591 DRAW BLOOD OFF VENOUS DEVICE: CPT

## 2023-10-23 PROCEDURE — 94060 EVALUATION OF WHEEZING: CPT

## 2023-10-23 RX ORDER — SODIUM CHLORIDE 0.9 % (FLUSH) 0.9 %
10 SYRINGE (ML) INJECTION AS NEEDED
OUTPATIENT
Start: 2023-10-23

## 2023-10-23 RX ORDER — HEPARIN SODIUM (PORCINE) LOCK FLUSH IV SOLN 100 UNIT/ML 100 UNIT/ML
500 SOLUTION INTRAVENOUS AS NEEDED
Status: CANCELLED | OUTPATIENT
Start: 2023-10-23

## 2023-10-23 RX ORDER — ALBUTEROL SULFATE 2.5 MG/3ML
2.5 SOLUTION RESPIRATORY (INHALATION) EVERY 4 HOURS PRN
Status: DISCONTINUED | OUTPATIENT
Start: 2023-10-23 | End: 2023-10-24 | Stop reason: HOSPADM

## 2023-10-23 RX ORDER — SODIUM CHLORIDE 0.9 % (FLUSH) 0.9 %
10 SYRINGE (ML) INJECTION AS NEEDED
Status: DISCONTINUED | OUTPATIENT
Start: 2023-10-23 | End: 2023-10-24 | Stop reason: HOSPADM

## 2023-10-23 RX ORDER — HEPARIN SODIUM (PORCINE) LOCK FLUSH IV SOLN 100 UNIT/ML 100 UNIT/ML
500 SOLUTION INTRAVENOUS AS NEEDED
Status: DISCONTINUED | OUTPATIENT
Start: 2023-10-23 | End: 2023-10-24 | Stop reason: HOSPADM

## 2023-10-23 RX ADMIN — Medication 10 ML: at 09:57

## 2023-10-23 RX ADMIN — HEPARIN 500 UNITS: 100 SYRINGE at 09:57

## 2023-10-23 RX ADMIN — ALBUTEROL SULFATE 2.5 MG: 2.5 SOLUTION RESPIRATORY (INHALATION) at 16:40

## 2023-10-23 NOTE — PROGRESS NOTES
10/23/2023  Patient Information  David Barfield                                                                                          [address]  [place] [address]   1950  'PCP/Referring Physician'  Amanda Smith PA  [phone]  Neetu Ashley MD  [phone]  Chief Complaint   Patient presents with    Consult     N/p per Dr. Ashley for lung nodules. Pt states that he has known about this nodules for 9 years, states that he feels ok just tired sometimes with exertion.        History of Present Illness:   The patient is  73-year-old male who was referred to me for evaluation of lung resection for cancer.  The patient is accompanied by his significant other and they begin with a long story regarding the fact that he, apparently, has had a lung nodule that has been followed at the [facility] for 9 years. I have been unable to document any of that. However, the patient has been seen by [facility] and [facility]. He has a 2.9 cm mass in the right lower lobe of the lung but the superior segment of the right lower lobe that was documented by CT scan, on September 8, 2023 with a subsequent PET scan, demonstrated hypermetabolic activity in the right lower lobe mass but no activity in the mediastinum. An EBUS was performed by [facility] and malignancy was detected in the level 7 lymph node. Therefore, the patient was designated as a stage IIIa lung cancer. He was presented at the tumor board and it was recommended per report, the patient undergo neoadjuvant therapy, followed by subsequent surgical resection. The patient has had an [id number] for chemotherapy placed by radiology.  He is apparently scheduled to begin chemotherapy tomorrow. The surgeon who was present at the tumor board indicated this patient would be a good candidate for surgical resection following chemotherapy but I have yet to see any mention of pulmonary function test or any  reports.  The patient is a current every day smoker and has smoked for over 53 years. The patient denies hemoptysis or weight loss. He does have chronic dry morning cough, but again I am unable to locate pulmonary function testing.      Patient Active Problem List   Diagnosis    High degree atrioventricular block    Bradycardia, sinus    Chronic hypotension    PVC's (premature ventricular contractions)    Presence of cardiac pacemaker    Mixed hyperlipidemia    COPD (chronic obstructive pulmonary disease)    Nodule of lower lobe of right lung    Mediastinal adenopathy    Cough    History of tobacco use, presenting hazards to health    Bronchogenic cancer of right lung    Malignant neoplasm of lower lobe of right lung     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    Diabetes mellitus Borderline    Emphysema/COPD     Erectile disorder     GERD (gastroesophageal reflux disease)     Hyperlipidemia     Hypertension 2019    Lung nodule     Mumps     Mumps     Pruritus     after bath    Slow to wake up after anesthesia     Wears dentures     upper only    Wears hearing aid in both ears     usually only wears right     Past Surgical History:   Procedure Laterality Date    BONE BIOPSY      broken bone surgery in his face    BRONCHOSCOPY WITH ION ROBOTIC ASSIST N/A 9/15/2023    Procedure: BRONCHOSCOPY NAVIGATION WITH ENDOBRONCHIAL ULTRASOUND AND ION ROBOT;  Surgeon: Octaviano Sampson MD;  Location:  [facility] ENDOSCOPY;  Service: Robotics - Pulmonary;  Laterality: N/A;  ion #6 - 0032  - 0015  Cath guide 0061    EBUS balloon removed and intact    CARDIAC ELECTROPHYSIOLOGY PROCEDURE N/A 08/17/2021    Procedure: Pacemaker DC new;  Surgeon: Kayy Box MD;  Location:  [facility] CATH INVASIVE LOCATION;  Service: Cardiology;  Laterality: N/A;    FACIAL FRACTURE SURGERY      INSERT / REPLACE / REMOVE PACEMAKER   August 17, 2021       Current Outpatient Medications:     albuterol sulfate  (90 Base) MCG/ACT inhaler, Inhale 2 puffs Every 4 (Four) Hours As Needed for Wheezing., Disp: 18 g, Rfl: 11    cephalexin (KEFLEX) 500 MG capsule, Take 1 capsule by mouth 2 (Two) Times a Day., Disp: 14 capsule, Rfl: 0    fexofenadine (ALLEGRA) 180 MG tablet, Take 1 tablet by mouth As Needed., Disp: , Rfl:     Fluticasone-Umeclidin-Vilant (Trelegy Ellipta) 100-62.5-25 MCG/ACT inhaler, Inhale 1 puff Daily., Disp: 3 each, Rfl: 3    lidocaine-prilocaine (EMLA) 2.5-2.5 % cream, Apply 1 application  topically to the appropriate area as directed As Needed (45-60 minutes prior to port access.  Cover with saran/plastic wrap.)., Disp: 30 g, Rfl: 3    lisinopril (PRINIVIL,ZESTRIL) 2.5 MG tablet, Take 1 tablet by mouth As Needed (elevated blood pressure). Take 1/2 tablet po prn (Patient taking differently: Take 1 tablet by mouth As Needed (elevated blood pressure systolic over 140). Take 1/2 tablet po prn), Disp: 30 tablet, Rfl: 1    ondansetron (ZOFRAN) 8 MG tablet, Take 1 tablet by mouth 3 (Three) Times a Day As Needed for Nausea or Vomiting., Disp: 30 tablet, Rfl: 2    pravastatin (PRAVACHOL) 80 MG tablet, Take 1 tablet by mouth Every Night., Disp: 30 tablet, Rfl: 11    sildenafil (VIAGRA) 100 MG tablet, Take 0.5 tablets by mouth Daily As Needed for Erectile Dysfunction., Disp: , Rfl:     fluticasone (VERAMYST) 27.5 MCG/SPRAY nasal spray, 2 sprays into the nostril(s) as directed by provider 1 (One) Time As Needed for Rhinitis or Allergies. (Patient not taking: Reported on 10/23/2023), Disp: , Rfl:     OLANZapine (zyPREXA) 5 MG tablet, Take 1 tablet by mouth Every Night. Take nightly x 4 starting night of chemotherapy. (Patient not taking: Reported on 10/23/2023), Disp: 4 tablet, Rfl: 2  No current facility-administered medications for this visit.    Facility-Administered Medications Ordered in Other Visits:     heparin injection 500 Units,  500 Units, Intravenous, Dion ALFREDO Amie E, MD, 500 Units at 10/23/23 0957    sodium chloride 0.9 % flush 10 mL, 10 mL, Intravenous, PRN, Neetu Ashley MD, 10 mL at 10/23/23 0957  Allergies   Allergen Reactions    Latex Other (See Comments)     Latex allergy     Tape Rash     Social History     Socioeconomic History    Marital status:     Number of children: 3   Tobacco Use    Smoking status: Every Day     Packs/day: 1.00     Years: 53.00     Additional pack years: 0.00     Total pack years: 53.00     Types: Cigarettes     Start date: 1/1/1968    Smokeless tobacco: Never    Tobacco comments:     Still smoke about 1 ppd   Vaping Use    Vaping Use: Never used   Substance and Sexual Activity    Alcohol use: Never    Drug use: Never    Sexual activity: Defer     Partners: Female     Birth control/protection: None     Family History   Problem Relation Age of Onset    Aneurysm Mother         brain    Dementia Father     Leukemia Sister     Hypertension Paternal Grandfather     Heart disease Paternal Grandmother      Review of Systems   Constitutional: Positive for malaise/fatigue. Negative for chills, decreased appetite, fever, weight gain and weight loss.   HENT:  Positive for congestion (sinus and allergry). Negative for hearing loss.    Cardiovascular:  Positive for dyspnea on exertion. Negative for chest pain, claudication, cyanosis, irregular heartbeat, leg swelling, near-syncope, orthopnea, palpitations, paroxysmal nocturnal dyspnea and syncope.   Respiratory:  Positive for shortness of breath, sleep disturbances due to breathing and sputum production. Negative for cough.    Endocrine: Negative for polydipsia, polyphagia and polyuria.   Hematologic/Lymphatic: Negative for adenopathy. Bruises/bleeds easily.   Skin:  Positive for poor wound healing.   Musculoskeletal:  Positive for joint pain (right shoulder) and muscle cramps (RLS). Negative for arthritis, falls, gout, joint swelling, muscle weakness and  "myalgias.   Gastrointestinal:  Negative for abdominal pain, anorexia, dysphagia, heartburn, nausea and vomiting.   Genitourinary:  Negative for dysuria, hematuria and hesitancy.   Neurological:  Positive for numbness (right hand). Negative for dizziness, focal weakness, headaches, loss of balance, seizures, vertigo and weakness.   Psychiatric/Behavioral:  Negative for altered mental status, depression, substance abuse and suicidal ideas. The patient is nervous/anxious (related to this upcoming sx).    Allergic/Immunologic: Positive for environmental allergies. Negative for HIV exposure and persistent infections.     Vitals:    10/23/23 0740   BP: 130/72   Pulse: 83   Temp: 98.3 °F (36.8 °C)   SpO2: 98%   Weight: 86.7 kg (191 lb 3.2 oz)   Height: 182.9 cm (72\")      Physical Exam   CONSTITUTIONAL: Alert and conversant, Well dressed, Well nourished, No acute distress  EYES: Sclera clean, Anicteric, Pupils equal  ENT: No nasal deviation, Trachea midline  NECK: No neck masses, Supple  LUNGS: No wheezing, Cough, non-congested  HEART: No rubs, No murmurs  GASTROINTESTINAL: Soft, non-distended, No masses, Non tender  to palpation, normal bowel sounds  NEURO: No motor deficits, No sensory deficits, Cranial Nerves 2 through 12 grossly intact  PSYCHIATRIC: Oriented to person, place and time, No memory deficits, Mood appropriate  VASCULAR: No carotid bruits, Femoral pulses palpable and symmetric  MUSKULOSKELETAL: No extremity trauma or extremity asymmetry    The ROS, past medical history, surgical history, family history, social history, and vitals were reviewed by myself and corrected as needed.      Labs/Imaging:  I have reviewed the CT scan images, the PET scan reports and I have also reviewed the pathology reports from the EBUS. The patient is an every day smoker, therefore, smoking cessation was discussed but the patient indicates he will not quit smoking. He currently has an advance directive on file at this time.    "   Assessment/Plan:   This patient is a 73-year-old male who has a stage IIIA lung cancer in the superior segment of the right lower lobe of the lung and diagnosis of stage IIIa by EBUS biopsy of the level 7 lymph node. He very much wants to proceed with adjuvant therapy followed by potential surgery. However, I have yet to see the pulmonary function test, which would indicate his surgical candidacy. I am trying to obtain those today as, apparently, he was presented to the tumor board and this was their recommendation. He is scheduled to start chemotherapy tomorrow 10/24/23.  However, he may not be a surgical candidate if pulmonary function tests are prohibitive. I also indicated to the patient that the cure rate for stage IIIA cancer in this patient population is extremely poor.  He does not care but wants to proceed with aggressive therapy, should he be a surgical candidate.  We will try to call the respiratory therapy department personally to see if there is any way those pulmonary function test could be done today before he starts chemotherapy tomorrow.     Patient Active Problem List   Diagnosis    High degree atrioventricular block    Bradycardia, sinus    Chronic hypotension    PVC's (premature ventricular contractions)    Presence of cardiac pacemaker    Mixed hyperlipidemia    COPD (chronic obstructive pulmonary disease)    Nodule of lower lobe of right lung    Mediastinal adenopathy    Cough    History of tobacco use, presenting hazards to health    Bronchogenic cancer of right lung    Malignant neoplasm of lower lobe of right lung         CC: MD Luis Enrique Ogden MD Regina Fugate editing for Joey Patel MD     I, Joey Patel MD, have read and agree with the editing done by Jacy Azar, .

## 2023-10-24 ENCOUNTER — OFFICE VISIT (OUTPATIENT)
Dept: ONCOLOGY | Facility: CLINIC | Age: 73
End: 2023-10-24
Payer: MEDICARE

## 2023-10-24 ENCOUNTER — TELEPHONE (OUTPATIENT)
Dept: ONCOLOGY | Facility: CLINIC | Age: 73
End: 2023-10-24

## 2023-10-24 ENCOUNTER — DOCUMENTATION (OUTPATIENT)
Dept: NUTRITION | Facility: HOSPITAL | Age: 73
End: 2023-10-24
Payer: MEDICARE

## 2023-10-24 ENCOUNTER — HOSPITAL ENCOUNTER (OUTPATIENT)
Dept: ONCOLOGY | Facility: HOSPITAL | Age: 73
Discharge: HOME | End: 2023-10-24
Admitting: INTERNAL MEDICINE
Payer: MEDICARE

## 2023-10-24 VITALS
HEART RATE: 69 BPM | HEIGHT: 72 IN | WEIGHT: 194 LBS | DIASTOLIC BLOOD PRESSURE: 71 MMHG | RESPIRATION RATE: 18 BRPM | TEMPERATURE: 97.3 F | SYSTOLIC BLOOD PRESSURE: 112 MMHG | BODY MASS INDEX: 26.28 KG/M2 | OXYGEN SATURATION: 98 %

## 2023-10-24 VITALS — SYSTOLIC BLOOD PRESSURE: 133 MMHG | HEART RATE: 70 BPM | DIASTOLIC BLOOD PRESSURE: 80 MMHG

## 2023-10-24 DIAGNOSIS — C34.31 MALIGNANT NEOPLASM OF LOWER LOBE OF RIGHT LUNG: Primary | ICD-10-CM

## 2023-10-24 PROCEDURE — 96413 CHEMO IV INFUSION 1 HR: CPT

## 2023-10-24 PROCEDURE — 96366 THER/PROPH/DIAG IV INF ADDON: CPT

## 2023-10-24 PROCEDURE — 25010000002 PALONOSETRON PER 25 MCG: Performed by: INTERNAL MEDICINE

## 2023-10-24 PROCEDURE — 25010000002 FOSAPREPITANT PER 1 MG: Performed by: INTERNAL MEDICINE

## 2023-10-24 PROCEDURE — 96375 TX/PRO/DX INJ NEW DRUG ADDON: CPT

## 2023-10-24 PROCEDURE — 25010000002 NIVOLUMAB 240 MG/24ML SOLUTION 24 ML VIAL: Performed by: INTERNAL MEDICINE

## 2023-10-24 PROCEDURE — 96417 CHEMO IV INFUS EACH ADDL SEQ: CPT

## 2023-10-24 PROCEDURE — 96368 THER/DIAG CONCURRENT INF: CPT

## 2023-10-24 PROCEDURE — 25810000003 SODIUM CHLORIDE 0.9 % SOLUTION 250 ML FLEX CONT: Performed by: INTERNAL MEDICINE

## 2023-10-24 PROCEDURE — 25810000003 SODIUM CHLORIDE 0.9 % SOLUTION 500 ML FLEX CONT: Performed by: INTERNAL MEDICINE

## 2023-10-24 PROCEDURE — 25010000002 HEPARIN LOCK FLUSH PER 10 UNITS: Performed by: INTERNAL MEDICINE

## 2023-10-24 PROCEDURE — 25010000002 DIPHENHYDRAMINE PER 50 MG: Performed by: INTERNAL MEDICINE

## 2023-10-24 PROCEDURE — 25010000002 PACLITAXEL PER 1 MG: Performed by: INTERNAL MEDICINE

## 2023-10-24 PROCEDURE — 25010000002 CARBOPLATIN PER 50 MG: Performed by: INTERNAL MEDICINE

## 2023-10-24 PROCEDURE — 96415 CHEMO IV INFUSION ADDL HR: CPT

## 2023-10-24 PROCEDURE — 96367 TX/PROPH/DG ADDL SEQ IV INF: CPT

## 2023-10-24 PROCEDURE — 25810000003 SODIUM CHLORIDE 0.9 % SOLUTION: Performed by: INTERNAL MEDICINE

## 2023-10-24 PROCEDURE — 25010000002 NIVOLUMAB 40 MG/4ML SOLUTION 4 ML VIAL: Performed by: INTERNAL MEDICINE

## 2023-10-24 PROCEDURE — 25010000002 DEXAMETHASONE SODIUM PHOSPHATE 100 MG/10ML SOLUTION: Performed by: INTERNAL MEDICINE

## 2023-10-24 RX ORDER — PALONOSETRON 0.05 MG/ML
0.25 INJECTION, SOLUTION INTRAVENOUS ONCE
Status: CANCELLED | OUTPATIENT
Start: 2023-10-24

## 2023-10-24 RX ORDER — SODIUM CHLORIDE 0.9 % (FLUSH) 0.9 %
10 SYRINGE (ML) INJECTION AS NEEDED
OUTPATIENT
Start: 2023-10-24

## 2023-10-24 RX ORDER — FAMOTIDINE 10 MG/ML
20 INJECTION, SOLUTION INTRAVENOUS ONCE
Status: DISCONTINUED | OUTPATIENT
Start: 2023-10-24 | End: 2023-10-25 | Stop reason: HOSPADM

## 2023-10-24 RX ORDER — SODIUM CHLORIDE 9 MG/ML
20 INJECTION, SOLUTION INTRAVENOUS ONCE
Status: CANCELLED | OUTPATIENT
Start: 2023-10-24

## 2023-10-24 RX ORDER — PALONOSETRON 0.05 MG/ML
0.25 INJECTION, SOLUTION INTRAVENOUS ONCE
Status: COMPLETED | OUTPATIENT
Start: 2023-10-24 | End: 2023-10-24

## 2023-10-24 RX ORDER — HEPARIN SODIUM (PORCINE) LOCK FLUSH IV SOLN 100 UNIT/ML 100 UNIT/ML
500 SOLUTION INTRAVENOUS AS NEEDED
OUTPATIENT
Start: 2023-10-24

## 2023-10-24 RX ORDER — DIPHENHYDRAMINE HYDROCHLORIDE 50 MG/ML
50 INJECTION INTRAMUSCULAR; INTRAVENOUS AS NEEDED
Status: CANCELLED | OUTPATIENT
Start: 2023-10-24

## 2023-10-24 RX ORDER — SODIUM CHLORIDE 9 MG/ML
20 INJECTION, SOLUTION INTRAVENOUS ONCE
Status: COMPLETED | OUTPATIENT
Start: 2023-10-24 | End: 2023-10-24

## 2023-10-24 RX ORDER — FAMOTIDINE 10 MG/ML
20 INJECTION, SOLUTION INTRAVENOUS AS NEEDED
Status: COMPLETED | OUTPATIENT
Start: 2023-10-24 | End: 2023-10-24

## 2023-10-24 RX ORDER — HEPARIN SODIUM (PORCINE) LOCK FLUSH IV SOLN 100 UNIT/ML 100 UNIT/ML
500 SOLUTION INTRAVENOUS AS NEEDED
Status: DISCONTINUED | OUTPATIENT
Start: 2023-10-24 | End: 2023-10-25 | Stop reason: HOSPADM

## 2023-10-24 RX ORDER — DIPHENHYDRAMINE HYDROCHLORIDE 50 MG/ML
50 INJECTION INTRAMUSCULAR; INTRAVENOUS AS NEEDED
Status: DISCONTINUED | OUTPATIENT
Start: 2023-10-24 | End: 2023-10-25 | Stop reason: HOSPADM

## 2023-10-24 RX ORDER — FAMOTIDINE 10 MG/ML
20 INJECTION, SOLUTION INTRAVENOUS AS NEEDED
Status: CANCELLED | OUTPATIENT
Start: 2023-10-24

## 2023-10-24 RX ORDER — FAMOTIDINE 10 MG/ML
20 INJECTION, SOLUTION INTRAVENOUS ONCE
Status: CANCELLED | OUTPATIENT
Start: 2023-10-24

## 2023-10-24 RX ADMIN — SODIUM CHLORIDE 360 MG: 9 INJECTION, SOLUTION INTRAVENOUS at 12:33

## 2023-10-24 RX ADMIN — CARBOPLATIN 550 MG: 10 INJECTION, SOLUTION INTRAVENOUS at 17:02

## 2023-10-24 RX ADMIN — SODIUM CHLORIDE 365 MG: 9 INJECTION, SOLUTION INTRAVENOUS at 13:20

## 2023-10-24 RX ADMIN — DIPHENHYDRAMINE HYDROCHLORIDE 50 MG: 50 INJECTION INTRAMUSCULAR; INTRAVENOUS at 12:17

## 2023-10-24 RX ADMIN — FAMOTIDINE 20 MG: 10 INJECTION INTRAVENOUS at 11:56

## 2023-10-24 RX ADMIN — PALONOSETRON HYDROCHLORIDE 0.25 MG: 0.25 INJECTION, SOLUTION INTRAVENOUS at 11:56

## 2023-10-24 RX ADMIN — HEPARIN 500 UNITS: 100 SYRINGE at 17:42

## 2023-10-24 RX ADMIN — DEXAMETHASONE SODIUM PHOSPHATE 20 MG: 10 INJECTION, SOLUTION INTRAMUSCULAR; INTRAVENOUS at 11:56

## 2023-10-24 RX ADMIN — SODIUM CHLORIDE 20 ML/HR: 9 INJECTION, SOLUTION INTRAVENOUS at 11:56

## 2023-10-24 RX ADMIN — FOSAPREPITANT 150 MG: 150 INJECTION, POWDER, LYOPHILIZED, FOR SOLUTION INTRAVENOUS at 11:56

## 2023-10-24 NOTE — PROGRESS NOTES
"Outpatient Oncology Nutrition     Reason for Visit:  Oncology Nutrition Screening and Patient Education / Met with patient and his significant other during his initial infusion appointment.     Patient Name:  David Barfield    :  1950    MRN:  [id number]    Date of Encounter: 10/24/2023    Nutrition Assessment     Diagnosis: Nonsmall cell lung cancer of the RLL, Stage IIIA     Surgery: repeat scans once neoadjuvant chemo is completed for possible surgical resection     Neoadjuvant Chemotherapy: OP LUNG  Nivolumab /  PACLitaxel / CARBOplatin - every 21 days x 3 planned cycles     Patient Active Problem List:    Patient Active Problem List   Diagnosis    High degree atrioventricular block    Bradycardia, sinus    Chronic hypotension    PVC's (premature ventricular contractions)    Presence of cardiac pacemaker    Mixed hyperlipidemia    COPD (chronic obstructive pulmonary disease)    Nodule of lower lobe of right lung    Mediastinal adenopathy    Cough    History of tobacco use, presenting hazards to health    Bronchogenic cancer of right lung    Malignant neoplasm of lower lobe of right lung       Food / Nutrition Related History       Hydration Status   Discussed the importance of hydration, reviewed hydrating fluids, and recommended he increase his intake.     Goal: 96 ounces     How many 8 ounces glasses of water do you consume per day? Patient drinks mostly coffee and cola.     Enteral Feeding       Anthropometric Measurements     Height:    Ht Readings from Last 1 Encounters:   10/24/23 182.9 cm (72.01\")       Weight:    Wt Readings from Last 1 Encounters:   10/24/23 88 kg (194 lb)       BMI:  26.3 - Overweight    Weight Change: weight has been in a stable range for the past 1+ year     Review of Lab Data (Time Frame - 1 month / 2 month)   Labs reviewed - 10/23/23     Medication Review   MAR reviewed     Nutrition Focused Physical Findings       Nutrition Impact Symptoms   Altered appetite "     Physical Activity   Not my normal self, but able to be up and about with fairly normal activities    Current Nutritional Intake     Oral diet:  Regular     Intake: patient reports his oral intake has been a little less than normal recently     Malnutrition Risk Assessment     Recent weight loss over the past 6 months:  0 = No    Eating poorly because of a decreased appetite:  1 = Yes    Malnutrition Screening Score:     MST = 0 or 1 Patient not at risk for malnutrition    Nutrition Diagnosis     Problem    Etiology    Signs / Symptoms      Nutrition Intervention   Discussed the importance of good nutrition during his treatment course focusing on adequate calorie, protein, nutrient and fluid intake.  Advised him to be consuming smaller more frequent meals/snacks throughout the day to aid with potential nausea management.  Emphasized the importance of protein and its role in the diet; reviewed high protein foods; and recommended he have a protein source at each meal/snack.  Offered several high protein snack ideas he may find more appealing at this time.  Reviewed the ACS nutrition booklet and suggested they use it as needed to aid with potential nutrition impact symptom management.      Goal   To achieve adequate nutritional and hydration intake.  To aid with nutrition impact symptom management as needed.     Monitoring / Evaluation   Answered their questions and both voiced understanding of information discussed.  RD's contact information provided and encouraged to call with questions.  Will follow up as indicated.     Anju Denton MS, RD, LD

## 2023-10-24 NOTE — TELEPHONE ENCOUNTER
Notified patient and Angeletta per Dr. Ashley that Dr. Ashley heard back from his surgeon, who reviewed his PFT's and agreed with plan for neoadjuvant followed by surgery.  They verbalized understanding.

## 2023-10-24 NOTE — PROGRESS NOTES
Approximately 2 hrs into Taxol infusion, developed scratchy throat and mild cough.  Chemo stopped.  VSS, NS infusing and Dr Ashley notified and assessed at bedside. No additional meds given and restarted at half rate, increased to full rate since tolerated.  Completed w/o event.

## 2023-10-24 NOTE — PROGRESS NOTES
[facility]ology [facility] [place]  Office number [phone]    Fax number [phone]     Follow up     Date: 10/24/2023     Patient Name: David Barfield  MRN: [id number]  : 1950    Referring Physician: Dr. Sampson    Chief Complaint: Nonsmall cell lung cancer    Cancer Staging:  Cancer Staging   Stage IIIA (cT2b, cN2, cM0)    History of Present Illness: aDvid Barfield is a pleasant 73 y.o. male who presents today for evaluation of NSCLC. He has a 50 pack year smoking history.    He has a history of a PET avid subpleural RLL lung nodule [facility] [place] [facility]. This had an SUV of 9.8 on PET  in association with increased mediastinal LN uptake with SUV around 3. Imaging was felt secondary to osteophyte fibrosis. He did undergo bronchoscopy 2020 with negative cytology. The RLL lung nodule was not felt amenable to bronchoscopy biopsy.    CT lung screen 2023 showed:      PET CT showed mass in the RLL intensely SUV avid, max 17. Mediastinal LN not hypermetabolic above baseline.     Right lower lobe robotic transbronchial biopsy and cytology on 9/15/2023 showed benign findings. Cultures including AFT and fungal were negative.  Station 11L, Station 4L LN negative  Station 7 LN showed NSCLCa (positive for cytokeratin 7, p63, and TTF-1 with no significant staining for p40 or Napsin. The staining pattern raises the possibility of mixed adenocarcinoma and squamous cell carcinoma differentiation in this tumor). PDL1 negative.    MRI brain was negative.    He is here for an opinion regarding management of newly diagnosed lung cancer.    He has a history of sick sinus syndrome s/p PPM and moderate COPD. He describes only mild physical limitations from this. For example he recently walked 1.5 miles at [employer]. At home, he climbs 17 steps without issue. He does sit down with long activities.     Treatment history:  Carbo, taxol, nivo, C1 planned  10/24/23    Interval history:   He is here for chemo immuno therapy initiation.  Since his last visit he completed antibiotics for port infection with resolution of erythema.  He saw Dr. Patel yesterday for consideration of neoadjuvant chemoimmunotherapy and surgery.  He and his significant other have questions regarding treatment goals, Tempus results, and schedule and PFT results.  He also had questions about nutrition during chemotherapy    Past Medical History:   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    Diabetes mellitus Borderline    Emphysema/COPD     Erectile disorder     GERD (gastroesophageal reflux disease)     Hyperlipidemia     Hypertension 2019    Lung nodule     Mumps     Mumps     Pruritus     after bath    Slow to wake up after anesthesia     Wears dentures     upper only    Wears hearing aid in both ears     usually only wears right   No personal history of myocardial infarction, cerebrovascular event, or venous thromboembolism.  No autoimmune diseases.   No prior cscope, mailed cologuard recently awaiting results.      Past Surgical History:   Past Surgical History:   Procedure Laterality Date    BONE BIOPSY      broken bone surgery in his face    BRONCHOSCOPY WITH ION ROBOTIC ASSIST N/A 9/15/2023    Procedure: BRONCHOSCOPY NAVIGATION WITH ENDOBRONCHIAL ULTRASOUND AND ION ROBOT;  Surgeon: Octaviano Sampson MD;  Location:  [email or web address];  Service: Robotics - Pulmonary;  Laterality: N/A;  ion #6 - [id number]  - 0015  Cath guide 0061    EBUS balloon removed and intact    CARDIAC ELECTROPHYSIOLOGY PROCEDURE N/A 08/17/2021    Procedure: Pacemaker DC new;  Surgeon: Kayy Box MD;  Location: [facility];  Service: Cardiology;  Laterality: N/A;    FACIAL FRACTURE SURGERY      INSERT / REPLACE / REMOVE PACEMAKER  August 17, 2021       Family History:    Family History   Problem Relation Age of Onset    Aneurysm Mother         brain    Dementia Father     Leukemia Sister     Hypertension Paternal Grandfather     Heart disease Paternal Grandmother    Sister leukemia at age 21  No other malignancy    Social History:   Social History     Socioeconomic History    Marital status:     Number of children: 3   Tobacco Use    Smoking status: Every Day     Packs/day: 1.00     Years: 53.00     Additional pack years: 0.00     Total pack years: 53.00     Types: Cigarettes     Start date: 1/1/1968    Smokeless tobacco: Never    Tobacco comments:     Still smoke about 1 ppd   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[place], [place]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 (Patient not taking: Reported on 10/23/2023), Disp: , Rfl:     Fluticasone-Umeclidin-Vilant (Trelegy Ellipta) 100-62.5-25 MCG/ACT inhaler, Inhale 1 puff Daily., Disp: 3 each, Rfl: 3    lidocaine-prilocaine (EMLA) 2.5-2.5 % cream, Apply 1 application  topically to the appropriate area as directed As Needed (45-60 minutes prior to port access.  Cover with saran/plastic wrap.)., Disp: 30 g, Rfl: 3    lisinopril (PRINIVIL,ZESTRIL) 2.5 MG tablet, Take 1 tablet by mouth As  "Needed (elevated blood pressure). Take 1/2 tablet po prn (Patient taking differently: Take 1 tablet by mouth As Needed (elevated blood pressure systolic over 140). Take 1/2 tablet po prn), Disp: 30 tablet, Rfl: 1    OLANZapine (zyPREXA) 5 MG tablet, Take 1 tablet by mouth Every Night. Take nightly x 4 starting night of chemotherapy. (Patient not taking: Reported on 10/23/2023), Disp: 4 tablet, Rfl: 2    ondansetron (ZOFRAN) 8 MG tablet, Take 1 tablet by mouth 3 (Three) Times a Day As Needed for Nausea or Vomiting., Disp: 30 tablet, Rfl: 2    pravastatin (PRAVACHOL) 80 MG tablet, Take 1 tablet by mouth Every Night., Disp: 30 tablet, Rfl: 11    sildenafil (VIAGRA) 100 MG tablet, Take 0.5 tablets by mouth Daily As Needed for Erectile Dysfunction., Disp: , Rfl:   No current facility-administered medications for this visit.    Allergies:   Allergies   Allergen Reactions    Latex Other (See Comments)     Latex allergy     Tape Rash       Objective     Vital Signs:   Vitals:    10/24/23 0928   BP: 112/71   Pulse: 69   Resp: 18   Temp: 97.3 °F (36.3 °C)   TempSrc: Infrared   SpO2: 98%   Weight: 88 kg (194 lb)   Height: 182.9 cm (72.01\")   PainSc: 0-No pain    Body mass index is 26.31 kg/m².   Pain Score    10/24/23 0928   PainSc: 0-No pain       ECOG Performance Status: 1 - Symptomatic but completely ambulatory    Physical Exam:   General: No acute distress. Well appearing   HEENT: Normocephalic, atraumatic. Sclera anicteric.   Neck: supple, no adenopathy.   Cardiovascular: regular rate and rhythm. No murmurs.   Respiratory: Normal rate. Clear to auscultation bilaterally  Abdomen: Soft, nontender, non distended with normoactive bowel sounds  Lymph: no cervical, supraclavicular or axillary adenopathy  Neuro: Alert and oriented x 3. No focal deficits.   Ext: Symmetric, no swelling.   Skin: Port site clean dry and intact with no surrounding erythema    Laboratory/Imaging Reviewed:   Hospital Outpatient Visit on 10/23/2023 "   Component Date Value Ref Range Status    Glucose 10/23/2023 162 (H)  65 - 99 mg/dL Final    BUN 10/23/2023 10  8 - 23 mg/dL Final    Creatinine 10/23/2023 0.96  0.76 - 1.27 mg/dL Final    Sodium 10/23/2023 138  136 - 145 mmol/L Final    Potassium 10/23/2023 4.3  3.5 - 5.2 mmol/L Final    Slight hemolysis detected by analyzer. Results may be affected.    Chloride 10/23/2023 101  98 - 107 mmol/L Final    CO2 10/23/2023 27.0  22.0 - 29.0 mmol/L Final    Calcium 10/23/2023 9.7  8.6 - 10.5 mg/dL Final    Total Protein 10/23/2023 8.2  6.0 - 8.5 g/dL Final    Albumin 10/23/2023 4.0  3.5 - 5.2 g/dL Final    ALT (SGPT) 10/23/2023 9  1 - 41 U/L Final    AST (SGOT) 10/23/2023 15  1 - 40 U/L Final    Alkaline Phosphatase 10/23/2023 98  39 - 117 U/L Final    Total Bilirubin 10/23/2023 0.4  0.0 - 1.2 mg/dL Final    Globulin 10/23/2023 4.2  gm/dL Final    Calculated Result    A/G Ratio 10/23/2023 1.0  g/dL Final    BUN/Creatinine Ratio 10/23/2023 10.4  7.0 - 25.0 Final    Anion Gap 10/23/2023 10.0  5.0 - 15.0 mmol/L Final    eGFR 10/23/2023 82.9  >60.0 mL/min/1.73 Final    WBC 10/23/2023 11.25 (H)  3.40 - 10.80 10*3/mm3 Final    RBC 10/23/2023 4.38  4.14 - 5.80 10*6/mm3 Final    Hemoglobin 10/23/2023 13.9  13.0 - 17.7 g/dL Final    Hematocrit 10/23/2023 42.1  37.5 - 51.0 % Final    MCV 10/23/2023 96.1  79.0 - 97.0 fL Final    MCH 10/23/2023 31.7  26.6 - 33.0 pg Final    MCHC 10/23/2023 33.0  31.5 - 35.7 g/dL Final    RDW 10/23/2023 13.8  12.3 - 15.4 % Final    RDW-SD 10/23/2023 49.1  37.0 - 54.0 fl Final    MPV 10/23/2023 8.5  6.0 - 12.0 fL Final    Platelets 10/23/2023 225  140 - 450 10*3/mm3 Final    Neutrophil % 10/23/2023 75.3  42.7 - 76.0 % Final    Lymphocyte % 10/23/2023 19.4 (L)  19.6 - 45.3 % Final    Monocyte % 10/23/2023 3.7 (L)  5.0 - 12.0 % Final    Eosinophil % 10/23/2023 1.2  0.3 - 6.2 % Final    Basophil % 10/23/2023 0.2  0.0 - 1.5 % Final    Immature Grans % 10/23/2023 0.2  0.0 - 0.5 % Final    Neutrophils,  Absolute 10/23/2023 8.47 (H)  1.70 - 7.00 10*3/mm3 Final    Lymphocytes, Absolute 10/23/2023 2.18  0.70 - 3.10 10*3/mm3 Final    Monocytes, Absolute 10/23/2023 0.42  0.10 - 0.90 10*3/mm3 Final    Eosinophils, Absolute 10/23/2023 0.14  0.00 - 0.40 10*3/mm3 Final    Basophils, Absolute 10/23/2023 0.02  0.00 - 0.20 10*3/mm3 Final    Immature Grans, Absolute 10/23/2023 0.02  0.00 - 0.05 10*3/mm3 Final   Hospital Outpatient Visit on 10/18/2023   Component Date Value Ref Range Status    Protime 10/18/2023 14.7 (H)  12.2 - 14.5 Seconds Final    INR 10/18/2023 1.14 (H)  0.89 - 1.12 Final    WBC 10/18/2023 7.91  3.40 - 10.80 10*3/mm3 Final    RBC 10/18/2023 4.33  4.14 - 5.80 10*6/mm3 Final    Hemoglobin 10/18/2023 13.6  13.0 - 17.7 g/dL Final    Hematocrit 10/18/2023 40.5  37.5 - 51.0 % Final    MCV 10/18/2023 93.5  79.0 - 97.0 fL Final    MCH 10/18/2023 31.4  26.6 - 33.0 pg Final    MCHC 10/18/2023 33.6  31.5 - 35.7 g/dL Final    RDW 10/18/2023 13.7  12.3 - 15.4 % Final    RDW-SD 10/18/2023 46.9  37.0 - 54.0 fl Final    MPV 10/18/2023 8.2  6.0 - 12.0 fL Final    Platelets 10/18/2023 247  140 - 450 10*3/mm3 Final    Neutrophil % 10/18/2023 66.5  42.7 - 76.0 % Final    Lymphocyte % 10/18/2023 22.4  19.6 - 45.3 % Final    Monocyte % 10/18/2023 9.0  5.0 - 12.0 % Final    Eosinophil % 10/18/2023 1.3  0.3 - 6.2 % Final    Basophil % 10/18/2023 0.4  0.0 - 1.5 % Final    Immature Grans % 10/18/2023 0.4  0.0 - 0.5 % Final    Neutrophils, Absolute 10/18/2023 5.27  1.70 - 7.00 10*3/mm3 Final    Lymphocytes, Absolute 10/18/2023 1.77  0.70 - 3.10 10*3/mm3 Final    Monocytes, Absolute 10/18/2023 0.71  0.10 - 0.90 10*3/mm3 Final    Eosinophils, Absolute 10/18/2023 0.10  0.00 - 0.40 10*3/mm3 Final    Basophils, Absolute 10/18/2023 0.03  0.00 - 0.20 10*3/mm3 Final    Immature Grans, Absolute 10/18/2023 0.03  0.00 - 0.05 10*3/mm3 Final    nRBC 10/18/2023 0.0  0.0 - 0.2 /100 WBC Final   Lab on 10/17/2023   Component Date Value Ref Range  Status    Glucose 10/17/2023 87  65 - 99 mg/dL Final    BUN 10/17/2023 8  8 - 23 mg/dL Final    Creatinine 10/17/2023 1.10  0.76 - 1.27 mg/dL Final    Sodium 10/17/2023 133 (L)  136 - 145 mmol/L Final    Potassium 10/17/2023 4.5  3.5 - 5.2 mmol/L Final    Slight hemolysis detected by analyzer. Results may be affected.    Chloride 10/17/2023 98  98 - 107 mmol/L Final    CO2 10/17/2023 27.0  22.0 - 29.0 mmol/L Final    Calcium 10/17/2023 9.5  8.6 - 10.5 mg/dL Final    Total Protein 10/17/2023 7.9  6.0 - 8.5 g/dL Final    Albumin 10/17/2023 4.0  3.5 - 5.2 g/dL Final    ALT (SGPT) 10/17/2023 11  1 - 41 U/L Final    AST (SGOT) 10/17/2023 14  1 - 40 U/L Final    Alkaline Phosphatase 10/17/2023 98  39 - 117 U/L Final    Total Bilirubin 10/17/2023 0.3  0.0 - 1.2 mg/dL Final    Globulin 10/17/2023 3.9  gm/dL Final    Calculated Result    A/G Ratio 10/17/2023 1.0  g/dL Final    BUN/Creatinine Ratio 10/17/2023 7.3  7.0 - 25.0 Final    Anion Gap 10/17/2023 8.0  5.0 - 15.0 mmol/L Final    eGFR 10/17/2023 70.4  >60.0 mL/min/1.73 Final    TSH 10/17/2023 1.810  0.270 - 4.200 uIU/mL Final    Free T4 10/17/2023 1.13  0.93 - 1.70 ng/dL Final    WBC 10/17/2023 8.53  3.40 - 10.80 10*3/mm3 Final    RBC 10/17/2023 4.43  4.14 - 5.80 10*6/mm3 Final    Hemoglobin 10/17/2023 13.9  13.0 - 17.7 g/dL Final    Hematocrit 10/17/2023 42.1  37.5 - 51.0 % Final    MCV 10/17/2023 95.0  79.0 - 97.0 fL Final    MCH 10/17/2023 31.4  26.6 - 33.0 pg Final    MCHC 10/17/2023 33.0  31.5 - 35.7 g/dL Final    RDW 10/17/2023 13.5  12.3 - 15.4 % Final    RDW-SD 10/17/2023 48.3  37.0 - 54.0 fl Final    MPV 10/17/2023 8.3  6.0 - 12.0 fL Final    Platelets 10/17/2023 265  140 - 450 10*3/mm3 Final    Neutrophil % 10/17/2023 66.1  42.7 - 76.0 % Final    Lymphocyte % 10/17/2023 23.2  19.6 - 45.3 % Final    Monocyte % 10/17/2023 9.6  5.0 - 12.0 % Final    Eosinophil % 10/17/2023 0.7  0.3 - 6.2 % Final    Basophil % 10/17/2023 0.2  0.0 - 1.5 % Final    Immature Grans %  10/17/2023 0.2  0.0 - 0.5 % Final    Neutrophils, Absolute 10/17/2023 5.63  1.70 - 7.00 10*3/mm3 Final    Lymphocytes, Absolute 10/17/2023 1.98  0.70 - 3.10 10*3/mm3 Final    Monocytes, Absolute 10/17/2023 0.82  0.10 - 0.90 10*3/mm3 Final    Eosinophils, Absolute 10/17/2023 0.06  0.00 - 0.40 10*3/mm3 Final    Basophils, Absolute 10/17/2023 0.02  0.00 - 0.20 10*3/mm3 Final    Immature Grans, Absolute 10/17/2023 0.02  0.00 - 0.05 10*3/mm3 Final       IR Port Placement    Result Date: 10/18/2023  Narrative: IR PORT PLACEMENT  History: lung cancer  : Kip Gutiérrez MD.  Modality: Sonography and fluoroscopy  DOSE REDUCTION: The examination was performed according to departmental dose-optimization program which includes automated exposure control, adjustment of the mA and/or kV. Fluoro time: 0.2 minutes Radiation dose: 1.4 mGy air Kerma.  SEDATION: Moderate sedation was administered. 2 milligram of Versed and 100 micrograms of fentanyl IV was used for moderate sedation. Total intra service time of sedation was 18 minutes. The sedation was administered and the patient's vital signs monitored throughout the procedure and recorded in the patient's medical record by the nurse under my direct supervision.  Anesthesia: Lidocaine 1% with epinephrine, local infiltration Medicines: None      Estimated blood loss:  < 5 cc.        Technique: A thorough discussion of the risks, benefits, and alternatives of the procedure, and if applicable, moderate sedation, was carried out with the patient. They were encouraged to ask any questions. Any questions were answered. They verbalized understanding. A written informed consent was then signed.  A multi-component timeout was performed prior to starting the procedure using the departmental protocol. The procedure room personnel used personal protective equipment. The operators used sterile gloves and if indicated, sterile gowns. The surgical site was prepped with chlorhexidine  gluconate  and draped in the maximal applicable sterile fashion. A preliminary ultrasonogram was performed of the neck that revealed a patent and compressible internal jugular vein. Pertinent ultrasound images were stored in the PACS for documentation. Using aseptic precautions, real-time ultrasound guidance, the internal jugular vein was accessed with a micropuncture needle after local anesthetic infiltration. A 018 guidewire was advanced into the central venous system under fluoroscopic guidance. Over the wire a micropuncture sheath was placed. Through the micropuncture sheath, a 035 wire was advanced into the venous system under fluoroscopic guidance. Over the wire a peel-away sheath was placed. After local anesthesia, using sharp and blunt dissection, a reservoir pocket of appropriate size was created in the infra clavicular chest wall. The port catheter was connected to the port reservoir. The catheter was tunneled to the venotomy site using a  tunneling device. The the reservoir was placed in the reservoir pocket. The catheter was trimmed to an appropriate length. The catheter was advanced into the venous system through the peel-away sheath which was removed. The port aspirated and flushed well and was terminally packed with heparin 100 units per cc, total 500 units. The port reservoir was irrigated with saline. The incision was closed in layers using absorbable suture and Dermabond. The venotomy site was closed using Dermabond. An aseptic dressing was applied using the protocol for Dermabond. The patient was transferred to the recovery area and was discharged from the department in stable condition.  Complications: None immediate.    Findings: Patent and compressible internal jugular vein. Final image shows the shaggy catheter to be in good position with the catheter tip at the cavoatrial junction/right atrium, an excellent position for use. There is no immediate complication.      Impression: Impression:     Successful ultrasound and fluoroscopic guided left internal jugular vein route single lumen Port-A-Cath placement as described above. There is a right-sided pacemaker in place. Thank you for the opportunity to assist in the care of your patient. Electronically Signed: Kip Gutiérrez MD  10/18/2023 3:50 PM EDT  Workstation ID: [id number]    MRI Brain With & Without Contrast    Result Date: 9/28/2023  Narrative: MRI BRAIN W WO CONTRAST Date of Exam: 9/28/2023 8:22 AM EDT Indication: Non-small cell lung cancer (NSCLC), staging.  Comparison: None available. Technique:  Routine multiplanar/multisequence sequence images of the brain were obtained before and after the uneventful administration of 18 mL Multihance. Findings: No acute infarct is present on diffusion weighted sequences. Midline structures are normal and the craniocervical junction appears satisfactory. Mild age-related changes are present with some generalized volume loss and few areas of subcortical white matter T2 hyperintensity, likely to represent typical sequela of microvascular ischemic change. There is otherwise no evidence of intracranial hemorrhage, mass or mass effect. There is no abnormal brain parenchymal enhancement. The ventricles are normal in size and configuration, accounting for surrounding volume loss. The orbits are normal. The paranasal sinuses are clear.     Impression: Impression: No evidence of intracranial metastatic disease. Mild to moderate typical age-related changes are present as above. There is otherwise no evidence of acute ischemia, hemorrhage, mass or abnormal enhancement. Electronically Signed: Alexandre Matos MD  9/28/2023 12:40 PM EDT  Workstation ID: [id number]     Procedures    Bronch 6/2020  Findings:       An EBUS bronchoscope was advanced through the endotracheal tube under        general anesthesia. A comprehensive EBUS survey of all available lymph        node stations revealed lymphadenopathy with benign sonographic  features        in stations 11L (9 mm), 4L (8 mm), 7 (15 mm), 4R (11 mm), and 11R (10        mm). EBUS-TBNA x 4 passes (at least) was done at each of those stations        in that sequence with the Olympus ViziShot 21G and 22G needles.       We then withdrew the EBUS scope and inserted a therapeutic scope into        the airway. A thorough airway exam to the first subsegmental level was        unremarkable without endobronchial lesions or secretions. A BAL was        obtained from the right lower lobe superior segment (RB6) (90 ml        instilled, 18 ml of cloudy fluid returned). Adequate hemostasis was        confirmed prior to withdrawing the scope. Patient tolerated procedure.  Impression:           Abnormal CT scan of chest                        1. Successful endobronchial ultrasound bronchoscopy                         with fine needle aspiration.                        2. Mediastinal/hilar lymphadenopathy with benign                         sonographic features in stations 11L, 4L, 7, 4R and 11R.                        3. EBUS-TBNA samples from stations 11L, 4L, 7, 4R and                         11R.                        4. Normal airway anatomy without active bleeding,                         endobronchial lesions or secretions.                        5. BAL from RLL     11R Lymph Node, (EBUS) Fine Needle Aspiration:  - Negative for malignancy  - Abundant lymphoid tissue and histiocytes with anthracotic pigments.     4R Lymph Node, (EBUS) Fine Needle Aspiration:  - Negative for malignancy.  - Abundant lymphoid tissue present.     Subcarinal Lymph Node, (EBUS) Fine Needle Aspiration:  - Negative for malignancy  - Abundant lymphoid tissue present.     4L Lymph Node, (EBUS) Fine Needle Aspiration:  - Negative for malignancy  - Abundant lymphoid tissue.     11L Lymph Node, (EBUS) Fine Needle Aspiration:  - Negative for malignancy.  - Abundant lymphoid tissue and histiocytes with anthracotic pigments.      Bronchoalveolar Lavage, RLL (ThinPrep only):  NO MALIGNANT CELLS IDENTIFIED  Benign respiratory epithelial cells and pulmonary macrophages       Microbiology Results     Date and Time Order Name Sensitivity Status Organisms Specimen ID Source     6/29/2020 1030 Fungus Culture and Smear   Preliminary   [id number] Lung     6/29/2020 1030 BAL/Brush Culture plus Stain, Semiquant.   Final   [id number] Lung     6/29/2020 1030 Acid Fast Culture Plus Stain   Preliminary   [id number] Lung     Assessment / Plan      Assessment/Plan:     Nonsmall cell lung cancer of the RLL, Stage IIIA  Chemo immunotherapy monitoring  -He has an enlarging RLL nodule with SUV of 17. Despite negative transbronchial biopsy, he was found to have N2 disease with a positive level 7 LN. We discussed options for definitive treatment to include induction chemo immunotherapy followed by surgical resection, chemoradiation followed by surgical resection, or definitive chemoradiation. We discussed differences in schedules and side effects. He has no contraindications to immunotherapy and I recommended nivolumab + chemotherapy induction.  His case was reviewed at multidisciplinary tumor board including thoracic surgery.  He was felt to be a good candidate for neoadjuvant chemo immunotherapy followed by resection assessment.  -I recommended nivolumab, carboplatin, paclitaxel x3 cycles followed by repeat imaging.   -Tempus reviewed and shows TP53 mutatation  -I reviewed his PFTs and discussed with thoracic surgery.  His lung function is adequate for neoadjuvant chemoimmunotherapy approach.  Tentatively plan posttreatment PET/CT followed by surgical resection after completion of 3 cycles of chemoimmunotherapy.  -Brain MRI negative    3. Port site cellulitis  Completing Keflex  Exam improved    4.  Possible infusion reaction  -Called to the infusion center for symptom assessment.  Patient experienced cough and Scratchy throat 2 hours into Taxol.  His vital signs  remained stable.  His symptoms resolved quickly with treatment interruption and no additional premeds given.  He was resumed at 50% rate and able to increase to full rate without recurrent reactions.    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  I discussed with genetic counselor.  Germline mutation felt to be low based on the specific variants identified on his testing, but will offer him genetic counseling and consideration of germline testing to further evaluate.    Follow Up:   1 week with labs and exam for treatment monitoring.     Neetu Ashley MD  Hematology and Oncology     I have spent a total of 40 min on reviewing test results/preparing to see patient, counseling patient, performing medically appropriate exam, placing orders, coordinating care, reassessment and infusion center and discussion with nursing, discussion with other specialists and documenting clinical information in the electronic or other health record

## 2023-10-25 ENCOUNTER — TELEPHONE (OUTPATIENT)
Dept: ONCOLOGY | Facility: CLINIC | Age: 73
End: 2023-10-25
Payer: MEDICARE

## 2023-10-25 NOTE — TELEPHONE ENCOUNTER
Caller: ISAÍAS ANDERSON    Relationship: Emergency Contact    Best call back number: [phone]     What is the best time to reach you: ASAP    Who are you requesting to speak with (clinical staff, provider,  specific staff member): CLINICAL      What was the call regarding: CALLER REPORTS THE PATIENT HAD CHEMO YESTERDAY AND NOW HAS A PERSISTANT ERECTION, PLEASE CALL [phone]  BACK TO ADVISE.  HUB UNABLE TO WARM TRANSFER.

## 2023-10-25 NOTE — TELEPHONE ENCOUNTER
Notified Balaji that Dr. Ashley read message and advises that this is not a normal reaction/side effect of chemo and patient should go to the nearest emergency room for evaluation and possible urology consult.  She verbalized understanding and that that is what they will do.

## 2023-10-26 ENCOUNTER — TELEPHONE (OUTPATIENT)
Dept: ONCOLOGY | Facility: CLINIC | Age: 73
End: 2023-10-26
Payer: MEDICARE

## 2023-10-26 DIAGNOSIS — C34.31 MALIGNANT NEOPLASM OF LOWER LOBE OF RIGHT LUNG: Primary | ICD-10-CM

## 2023-10-26 NOTE — TELEPHONE ENCOUNTER
----- Message from Neetu Ashley MD sent at 10/24/2023  4:22 PM EDT -----  Regarding: RE: [id number]  Great thank you Amanda.     Can you pass this info along to him Faiza. OK to place referral if interested  ----- Message -----  From: Maria L Verma  Sent: 10/24/2023  12:26 PM EDT  To: Neetu Ashley MD  Subject: RE: [id number]                                         The allele frequency and his age of dx make it really unlikely that the TP53 mutation is germline, but we would be happy to discuss further.  If he wanted to proceed with germline testing, we have the option of sending to [facility].  They have a policy that they won't back bill patients with Medicare even if they don't meet NCCN criteria.       ----- Message -----  From: Neetu Ashley MD  Sent: 10/24/2023  10:29 AM EDT  To: Maria L Verma  Subject: [id number]                                             He does have a family history of leukemia. He would like to know whether he should meet with genetics to consider genetic mutation.

## 2023-10-26 NOTE — TELEPHONE ENCOUNTER
"Notified patient regarding genetic testing.  Spoke with him and his SO.  Both agreed to referral, referral placed and sent message to genetics.  Also, patient's SO stated priapism \"went away yesterday\" and patient refused to go to the ED.  Balaji educated that this is a medical emergency and if it happens again, patient should go to the ER.  She verbalized understanding.  Dr. Ashley notified.  "

## 2023-10-30 ENCOUNTER — TELEPHONE (OUTPATIENT)
Dept: ONCOLOGY | Facility: CLINIC | Age: 73
End: 2023-10-30
Payer: MEDICARE

## 2023-10-30 NOTE — TELEPHONE ENCOUNTER
Caller: ISAÍAS ANDERSON    Relationship: Emergency Contact    Best call back number: [phone]    What was the call regarding: PT HAS A SPOT ON HIS LEFT FOOT AND IS HOT TO THE TOUCH AND PAINFUL AND CAN NOT WALK ON IT. THEY WANTED TO KNOW WHAT TO DO?

## 2023-10-30 NOTE — TELEPHONE ENCOUNTER
"Patient stated he had a spot on his foot before chemo and that it wasn't red.  He stated it has been red, the whole foot has been red and now its back to that \"spot\" that's red along with his toes.  He stated the pain is worse when he is ambulating more.  Patient stated he doesn't remember injuring that foot.  Patient denies h/o DM. Patient denies SOA, chest pain/pressure or pain with inspiration.  Dr. Ashley notified and per MD, patient advised that if he thinks its bad enough, he should go to the nearest emergency room but otherwise, she will evaluate it tomorrow morning at his appointment.  Patient advised that it does need to be evaluated soon, whether at the ER tonight or in the morning with Dr. Ashley.  Patient verbalized understanding.  "

## 2023-10-31 ENCOUNTER — LAB (OUTPATIENT)
Dept: LAB | Facility: HOSPITAL | Age: 73
End: 2023-10-31
Payer: MEDICARE

## 2023-10-31 ENCOUNTER — OFFICE VISIT (OUTPATIENT)
Dept: ONCOLOGY | Facility: CLINIC | Age: 73
End: 2023-10-31
Payer: MEDICARE

## 2023-10-31 ENCOUNTER — HOSPITAL ENCOUNTER (OUTPATIENT)
Dept: GENERAL RADIOLOGY | Facility: HOSPITAL | Age: 73
Discharge: HOME | End: 2023-10-31
Admitting: INTERNAL MEDICINE
Payer: MEDICARE

## 2023-10-31 VITALS
RESPIRATION RATE: 18 BRPM | BODY MASS INDEX: 26.41 KG/M2 | HEART RATE: 89 BPM | TEMPERATURE: 96.9 F | OXYGEN SATURATION: 99 % | SYSTOLIC BLOOD PRESSURE: 134 MMHG | HEIGHT: 72 IN | WEIGHT: 195 LBS | DIASTOLIC BLOOD PRESSURE: 79 MMHG

## 2023-10-31 DIAGNOSIS — C34.31 MALIGNANT NEOPLASM OF LOWER LOBE OF RIGHT LUNG: ICD-10-CM

## 2023-10-31 DIAGNOSIS — B07.0 PLANTAR WARTS: ICD-10-CM

## 2023-10-31 DIAGNOSIS — C34.31 MALIGNANT NEOPLASM OF LOWER LOBE OF RIGHT LUNG: Primary | ICD-10-CM

## 2023-10-31 DIAGNOSIS — L03.119 CELLULITIS OF FOOT: ICD-10-CM

## 2023-10-31 DIAGNOSIS — Z09 CHEMOTHERAPY FOLLOW-UP EXAMINATION: ICD-10-CM

## 2023-10-31 LAB
ANION GAP SERPL CALCULATED.3IONS-SCNC: 8 MMOL/L (ref 5–15)
BASOPHILS # BLD AUTO: 0.02 10*3/MM3 (ref 0–0.2)
BASOPHILS NFR BLD AUTO: 0.7 % (ref 0–1.5)
BUN SERPL-MCNC: 10 MG/DL (ref 8–23)
BUN/CREAT SERPL: 9.9 (ref 7–25)
CALCIUM SPEC-SCNC: 9.7 MG/DL (ref 8.6–10.5)
CHLORIDE SERPL-SCNC: 99 MMOL/L (ref 98–107)
CO2 SERPL-SCNC: 27 MMOL/L (ref 22–29)
CREAT SERPL-MCNC: 1.01 MG/DL (ref 0.76–1.27)
DEPRECATED RDW RBC AUTO: 46.5 FL (ref 37–54)
EGFRCR SERPLBLD CKD-EPI 2021: 78 ML/MIN/1.73
EOSINOPHIL # BLD AUTO: 0.19 10*3/MM3 (ref 0–0.4)
EOSINOPHIL NFR BLD AUTO: 6.6 % (ref 0.3–6.2)
ERYTHROCYTE [DISTWIDTH] IN BLOOD BY AUTOMATED COUNT: 13.1 % (ref 12.3–15.4)
GLUCOSE SERPL-MCNC: 132 MG/DL (ref 65–99)
HCT VFR BLD AUTO: 38.7 % (ref 37.5–51)
HGB BLD-MCNC: 13 G/DL (ref 13–17.7)
IMM GRANULOCYTES # BLD AUTO: 0.01 10*3/MM3 (ref 0–0.05)
IMM GRANULOCYTES NFR BLD AUTO: 0.3 % (ref 0–0.5)
LYMPHOCYTES # BLD AUTO: 0.83 10*3/MM3 (ref 0.7–3.1)
LYMPHOCYTES NFR BLD AUTO: 28.8 % (ref 19.6–45.3)
MCH RBC QN AUTO: 31.9 PG (ref 26.6–33)
MCHC RBC AUTO-ENTMCNC: 33.6 G/DL (ref 31.5–35.7)
MCV RBC AUTO: 94.9 FL (ref 79–97)
MONOCYTES # BLD AUTO: 0.16 10*3/MM3 (ref 0.1–0.9)
MONOCYTES NFR BLD AUTO: 5.6 % (ref 5–12)
NEUTROPHILS NFR BLD AUTO: 1.67 10*3/MM3 (ref 1.7–7)
NEUTROPHILS NFR BLD AUTO: 58 % (ref 42.7–76)
PLATELET # BLD AUTO: 176 10*3/MM3 (ref 140–450)
PMV BLD AUTO: 9.2 FL (ref 6–12)
POTASSIUM SERPL-SCNC: 4.2 MMOL/L (ref 3.5–5.2)
RBC # BLD AUTO: 4.08 10*6/MM3 (ref 4.14–5.8)
SODIUM SERPL-SCNC: 134 MMOL/L (ref 136–145)
WBC NRBC COR # BLD: 2.88 10*3/MM3 (ref 3.4–10.8)

## 2023-10-31 PROCEDURE — 36415 COLL VENOUS BLD VENIPUNCTURE: CPT

## 2023-10-31 PROCEDURE — 80048 BASIC METABOLIC PNL TOTAL CA: CPT

## 2023-10-31 PROCEDURE — 73630 X-RAY EXAM OF FOOT: CPT

## 2023-10-31 PROCEDURE — 85025 COMPLETE CBC W/AUTO DIFF WBC: CPT

## 2023-10-31 RX ORDER — DIPHENHYDRAMINE, LIDOCAINE, NYSTATIN
KIT ORAL
Qty: 240 ML | Refills: 3 | Status: SHIPPED | OUTPATIENT
Start: 2023-10-31

## 2023-10-31 RX ORDER — SULFAMETHOXAZOLE AND TRIMETHOPRIM 800; 160 MG/1; MG/1
1 TABLET ORAL 2 TIMES DAILY
Qty: 14 TABLET | Refills: 0 | Status: SHIPPED | OUTPATIENT
Start: 2023-10-31

## 2023-10-31 NOTE — PROGRESS NOTES
[facility]  Office number [phone]    Fax number [phone]     Follow up     Date: 10/31/23    Patient Name: David Barfield  MRN: [id number]  : 1950    Referring Physician: Dr. Sampson    Chief Complaint: Nonsmall cell lung cancer    Cancer Staging:  Cancer Staging   Stage IIIA (cT2b, cN2, cM0)    History of Present Illness: David Barfield is a pleasant 73 y.o. male who presents today for evaluation of NSCLC. He has a 50 pack year smoking history.    He has a history of a PET avid subpleural RLL lung nodule [facility] [place] [facility]. This had an SUV of 9.8 on PET  in association with increased mediastinal LN uptake with SUV around 3. Imaging was felt secondary to osteophyte fibrosis. He did undergo bronchoscopy 2020 with negative cytology. The RLL lung nodule was not felt amenable to bronchoscopy biopsy.    CT lung screen 2023 showed:      PET CT showed mass in the RLL intensely SUV avid, max 17. Mediastinal LN not hypermetabolic above baseline.     Right lower lobe robotic transbronchial biopsy and cytology on 9/15/2023 showed benign findings. Cultures including AFT and fungal were negative.  Station 11L, Station 4L LN negative  Station 7 LN showed NSCLCa (positive for cytokeratin 7, p63, and TTF-1 with no significant staining for p40 or Napsin. The staining pattern raises the possibility of mixed adenocarcinoma and squamous cell carcinoma differentiation in this tumor). PDL1 negative.    MRI brain was negative.    He is here for an opinion regarding management of newly diagnosed lung cancer.    He has a history of sick sinus syndrome s/p PPM and moderate COPD. He describes only mild physical limitations from this. For example he recently walked 1.5 miles at [employer]. At home, he climbs 17 steps without issue. He does sit down with long activities.     Treatment history:  Carbo, taxol, nivo, C1 10/24/23;     Interval  "history:   He presents for follow-up after recent chemotherapy.    He reports pain in left ball of foot with erythema at the base of his great toe starting Friday.  He completed Keflex for port site infection the day prior.  He denied any fever or systemic symptoms, but on Saturday noted erythema streaking upwards to his leg. Was painful to bear weight.  They did call and speak with the physician on-call who recommended an ER evaluation.  Significant other states he did not want to go so she gave him an anti-inflammatory and the erythema improved in his leg but persisted in his foot.  Called on call  And told to go to ED. Instead took ibuprofen and declined evaluation.  They did speak with a nurse from my office at 4 PM yesterday but was too late to be seen in the office and declined urgent care evaluation.  He still has pain with bearing weight and erythema but less so.  Has \"plantar wart\" in center of the erythema of his sole that has been there for a few months.     In addition he reports generalized body aches which were manageable following chemo.  Chemotherapy-induced nausea was well controlled with as needed Zofran.  He does note some burning in his mouth and has been using baking soda rinses.  He had a single episode of diarrhea that resolved with Imodium.  He started experiencing acid reflux last night.  He had fatigue but his energy is now back to 80% of his baseline.    Past Medical History:   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    Diabetes mellitus Borderline    Emphysema/COPD     Erectile disorder     GERD (gastroesophageal reflux disease)     Hyperlipidemia     Hypertension 2019    Lung nodule     Mumps     Mumps     Pruritus     after bath    Slow to wake up after anesthesia     Wears dentures     upper only    Wears hearing aid in both ears     usually only wears right   No " personal history of myocardial infarction, cerebrovascular event, or venous thromboembolism.  No autoimmune diseases.   No prior cscope, mailed cologuard recently awaiting results.      Past Surgical History:   Past Surgical History:   Procedure Laterality Date    BONE BIOPSY      broken bone surgery in his face    BRONCHOSCOPY WITH ION ROBOTIC ASSIST N/A 9/15/2023    Procedure: BRONCHOSCOPY NAVIGATION WITH ENDOBRONCHIAL ULTRASOUND AND ION ROBOT;  Surgeon: Octaviano Sampson MD;  Location:  [facility];  Service: Robotics - Pulmonary;  Laterality: N/A;  ion #6 - [id number]  - 0015  Cath guide 0061    EBUS balloon removed and intact    CARDIAC ELECTROPHYSIOLOGY PROCEDURE N/A 08/17/2021    Procedure: Pacemaker DC new;  Surgeon: Kayy Box MD;  Location:  [facility];  Service: Cardiology;  Laterality: N/A;    FACIAL FRACTURE SURGERY      INSERT / REPLACE / REMOVE PACEMAKER  August 17, 2021       Family History:   Family History   Problem Relation Age of Onset    Aneurysm Mother         brain    Dementia Father     Leukemia Sister     Hypertension Paternal Grandfather     Heart disease Paternal Grandmother    Sister leukemia at age 21  No other malignancy    Social History:   Social History     Socioeconomic History    Marital status:     Number of children: 3   Tobacco Use    Smoking status: Every Day     Packs/day: 1.00     Years: 53.00     Additional pack years: 0.00     Total pack years: 53.00     Types: Cigarettes     Start date: 1/1/1968    Smokeless tobacco: Never    Tobacco comments:     Still smoke about 1 ppd   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[place], [place]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  Rebuilds cars, 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    Medications:     Current Outpatient Medications:     albuterol  sulfate  (90 Base) MCG/ACT inhaler, Inhale 2 puffs Every 4 (Four) Hours As Needed for Wheezing., Disp: 18 g, Rfl: 11    cephalexin (KEFLEX) 500 MG capsule, Take 1 capsule by mouth 2 (Two) Times a Day., Disp: 14 capsule, Rfl: 0    fexofenadine (ALLEGRA) 180 MG tablet, Take 1 tablet by mouth As Needed., Disp: , Rfl:     fluticasone (VERAMYST) 27.5 MCG/SPRAY nasal spray, 2 sprays into the nostril(s) as directed by provider 1 (One) Time As Needed for Rhinitis or Allergies., Disp: 6 mL, Rfl: 2    Fluticasone-Umeclidin-Vilant (Trelegy Ellipta) 100-62.5-25 MCG/ACT inhaler, Inhale 1 puff Daily., Disp: 3 each, Rfl: 3    lidocaine-prilocaine (EMLA) 2.5-2.5 % cream, Apply 1 application  topically to the appropriate area as directed As Needed (45-60 minutes prior to port access.  Cover with saran/plastic wrap.)., Disp: 30 g, Rfl: 3    lisinopril (PRINIVIL,ZESTRIL) 2.5 MG tablet, Take 1 tablet by mouth As Needed (elevated blood pressure). Take 1/2 tablet po prn (Patient taking differently: Take 1 tablet by mouth As Needed (elevated blood pressure systolic over 140). Take 1/2 tablet po prn), Disp: 30 tablet, Rfl: 1    OLANZapine (zyPREXA) 5 MG tablet, Take 1 tablet by mouth Every Night. Take nightly x 4 starting night of chemotherapy. (Patient not taking: Reported on 10/23/2023), Disp: 4 tablet, Rfl: 2    ondansetron (ZOFRAN) 8 MG tablet, Take 1 tablet by mouth 3 (Three) Times a Day As Needed for Nausea or Vomiting., Disp: 30 tablet, Rfl: 2    pravastatin (PRAVACHOL) 80 MG tablet, Take 1 tablet by mouth Every Night., Disp: 30 tablet, Rfl: 11    sildenafil (VIAGRA) 100 MG tablet, Take 0.5 tablets by mouth Daily As Needed for Erectile Dysfunction., Disp: , Rfl:     Allergies:   Allergies   Allergen Reactions    Latex Other (See Comments)     Latex allergy     Tape Rash       Objective     Vital Signs:   Vitals:    10/31/23 0834   BP: 134/79   Pulse: 89   Resp: 18   Temp: 96.9 °F (36.1 °C)   TempSrc: Infrared   SpO2: 99%  "  Weight: 88.5 kg (195 lb)   Height: 182.9 cm (72.01\")   PainSc:   1   PainLoc: Foot    Body mass index is 26.44 kg/m².   Pain Score    10/31/23 0834   PainSc:   1   PainLoc: Foot       ECOG Performance Status: 1 - Symptomatic but completely ambulatory    Physical Exam:   General: No acute distress. Well appearing   HEENT: Normocephalic, atraumatic. Sclera anicteric.   Neck: supple, no adenopathy.   Cardiovascular: regular rate and rhythm. No murmurs.   Respiratory: Normal rate. Clear to auscultation bilaterally  Abdomen: Soft, nontender, non distended with normoactive bowel sounds  Lymph: no cervical, supraclavicular or axillary adenopathy  Neuro: Alert and oriented x 3. No focal deficits.   Ext: Symmetric, no swelling.   Skin: Port site clean dry and intact with no surrounding erythema    Laboratory/Imaging Reviewed:   Lab on 10/31/2023   Component Date Value Ref Range Status    Glucose 10/31/2023 132 (H)  65 - 99 mg/dL Final    BUN 10/31/2023 10  8 - 23 mg/dL Final    Creatinine 10/31/2023 1.01  0.76 - 1.27 mg/dL Final    Sodium 10/31/2023 134 (L)  136 - 145 mmol/L Final    Potassium 10/31/2023 4.2  3.5 - 5.2 mmol/L Final    Chloride 10/31/2023 99  98 - 107 mmol/L Final    CO2 10/31/2023 27.0  22.0 - 29.0 mmol/L Final    Calcium 10/31/2023 9.7  8.6 - 10.5 mg/dL Final    BUN/Creatinine Ratio 10/31/2023 9.9  7.0 - 25.0 Final    Anion Gap 10/31/2023 8.0  5.0 - 15.0 mmol/L Final    eGFR 10/31/2023 78.0  >60.0 mL/min/1.73 Final    WBC 10/31/2023 2.88 (L)  3.40 - 10.80 10*3/mm3 Final    RBC 10/31/2023 4.08 (L)  4.14 - 5.80 10*6/mm3 Final    Hemoglobin 10/31/2023 13.0  13.0 - 17.7 g/dL Final    Hematocrit 10/31/2023 38.7  37.5 - 51.0 % Final    MCV 10/31/2023 94.9  79.0 - 97.0 fL Final    MCH 10/31/2023 31.9  26.6 - 33.0 pg Final    MCHC 10/31/2023 33.6  31.5 - 35.7 g/dL Final    RDW 10/31/2023 13.1  12.3 - 15.4 % Final    RDW-SD 10/31/2023 46.5  37.0 - 54.0 fl Final    MPV 10/31/2023 9.2  6.0 - 12.0 fL Final    " Platelets 10/31/2023 176  140 - 450 10*3/mm3 Final    Neutrophil % 10/31/2023 58.0  42.7 - 76.0 % Final    Lymphocyte % 10/31/2023 28.8  19.6 - 45.3 % Final    Monocyte % 10/31/2023 5.6  5.0 - 12.0 % Final    Eosinophil % 10/31/2023 6.6 (H)  0.3 - 6.2 % Final    Basophil % 10/31/2023 0.7  0.0 - 1.5 % Final    Immature Grans % 10/31/2023 0.3  0.0 - 0.5 % Final    Neutrophils, Absolute 10/31/2023 1.67 (L)  1.70 - 7.00 10*3/mm3 Final    Lymphocytes, Absolute 10/31/2023 0.83  0.70 - 3.10 10*3/mm3 Final    Monocytes, Absolute 10/31/2023 0.16  0.10 - 0.90 10*3/mm3 Final    Eosinophils, Absolute 10/31/2023 0.19  0.00 - 0.40 10*3/mm3 Final    Basophils, Absolute 10/31/2023 0.02  0.00 - 0.20 10*3/mm3 Final    Immature Grans, Absolute 10/31/2023 0.01  0.00 - 0.05 10*3/mm3 Final   Hospital Outpatient Visit on 10/23/2023   Component Date Value Ref Range Status    Glucose 10/23/2023 162 (H)  65 - 99 mg/dL Final    BUN 10/23/2023 10  8 - 23 mg/dL Final    Creatinine 10/23/2023 0.96  0.76 - 1.27 mg/dL Final    Sodium 10/23/2023 138  136 - 145 mmol/L Final    Potassium 10/23/2023 4.3  3.5 - 5.2 mmol/L Final    Slight hemolysis detected by analyzer. Results may be affected.    Chloride 10/23/2023 101  98 - 107 mmol/L Final    CO2 10/23/2023 27.0  22.0 - 29.0 mmol/L Final    Calcium 10/23/2023 9.7  8.6 - 10.5 mg/dL Final    Total Protein 10/23/2023 8.2  6.0 - 8.5 g/dL Final    Albumin 10/23/2023 4.0  3.5 - 5.2 g/dL Final    ALT (SGPT) 10/23/2023 9  1 - 41 U/L Final    AST (SGOT) 10/23/2023 15  1 - 40 U/L Final    Alkaline Phosphatase 10/23/2023 98  39 - 117 U/L Final    Total Bilirubin 10/23/2023 0.4  0.0 - 1.2 mg/dL Final    Globulin 10/23/2023 4.2  gm/dL Final    Calculated Result    A/G Ratio 10/23/2023 1.0  g/dL Final    BUN/Creatinine Ratio 10/23/2023 10.4  7.0 - 25.0 Final    Anion Gap 10/23/2023 10.0  5.0 - 15.0 mmol/L Final    eGFR 10/23/2023 82.9  >60.0 mL/min/1.73 Final    WBC 10/23/2023 11.25 (H)  3.40 - 10.80 10*3/mm3  Final    RBC 10/23/2023 4.38  4.14 - 5.80 10*6/mm3 Final    Hemoglobin 10/23/2023 13.9  13.0 - 17.7 g/dL Final    Hematocrit 10/23/2023 42.1  37.5 - 51.0 % Final    MCV 10/23/2023 96.1  79.0 - 97.0 fL Final    MCH 10/23/2023 31.7  26.6 - 33.0 pg Final    MCHC 10/23/2023 33.0  31.5 - 35.7 g/dL Final    RDW 10/23/2023 13.8  12.3 - 15.4 % Final    RDW-SD 10/23/2023 49.1  37.0 - 54.0 fl Final    MPV 10/23/2023 8.5  6.0 - 12.0 fL Final    Platelets 10/23/2023 225  140 - 450 10*3/mm3 Final    Neutrophil % 10/23/2023 75.3  42.7 - 76.0 % Final    Lymphocyte % 10/23/2023 19.4 (L)  19.6 - 45.3 % Final    Monocyte % 10/23/2023 3.7 (L)  5.0 - 12.0 % Final    Eosinophil % 10/23/2023 1.2  0.3 - 6.2 % Final    Basophil % 10/23/2023 0.2  0.0 - 1.5 % Final    Immature Grans % 10/23/2023 0.2  0.0 - 0.5 % Final    Neutrophils, Absolute 10/23/2023 8.47 (H)  1.70 - 7.00 10*3/mm3 Final    Lymphocytes, Absolute 10/23/2023 2.18  0.70 - 3.10 10*3/mm3 Final    Monocytes, Absolute 10/23/2023 0.42  0.10 - 0.90 10*3/mm3 Final    Eosinophils, Absolute 10/23/2023 0.14  0.00 - 0.40 10*3/mm3 Final    Basophils, Absolute 10/23/2023 0.02  0.00 - 0.20 10*3/mm3 Final    Immature Grans, Absolute 10/23/2023 0.02  0.00 - 0.05 10*3/mm3 Final   Hospital Outpatient Visit on 10/18/2023   Component Date Value Ref Range Status    Protime 10/18/2023 14.7 (H)  12.2 - 14.5 Seconds Final    INR 10/18/2023 1.14 (H)  0.89 - 1.12 Final    WBC 10/18/2023 7.91  3.40 - 10.80 10*3/mm3 Final    RBC 10/18/2023 4.33  4.14 - 5.80 10*6/mm3 Final    Hemoglobin 10/18/2023 13.6  13.0 - 17.7 g/dL Final    Hematocrit 10/18/2023 40.5  37.5 - 51.0 % Final    MCV 10/18/2023 93.5  79.0 - 97.0 fL Final    MCH 10/18/2023 31.4  26.6 - 33.0 pg Final    MCHC 10/18/2023 33.6  31.5 - 35.7 g/dL Final    RDW 10/18/2023 13.7  12.3 - 15.4 % Final    RDW-SD 10/18/2023 46.9  37.0 - 54.0 fl Final    MPV 10/18/2023 8.2  6.0 - 12.0 fL Final    Platelets 10/18/2023 247  140 - 450 10*3/mm3 Final     Neutrophil % 10/18/2023 66.5  42.7 - 76.0 % Final    Lymphocyte % 10/18/2023 22.4  19.6 - 45.3 % Final    Monocyte % 10/18/2023 9.0  5.0 - 12.0 % Final    Eosinophil % 10/18/2023 1.3  0.3 - 6.2 % Final    Basophil % 10/18/2023 0.4  0.0 - 1.5 % Final    Immature Grans % 10/18/2023 0.4  0.0 - 0.5 % Final    Neutrophils, Absolute 10/18/2023 5.27  1.70 - 7.00 10*3/mm3 Final    Lymphocytes, Absolute 10/18/2023 1.77  0.70 - 3.10 10*3/mm3 Final    Monocytes, Absolute 10/18/2023 0.71  0.10 - 0.90 10*3/mm3 Final    Eosinophils, Absolute 10/18/2023 0.10  0.00 - 0.40 10*3/mm3 Final    Basophils, Absolute 10/18/2023 0.03  0.00 - 0.20 10*3/mm3 Final    Immature Grans, Absolute 10/18/2023 0.03  0.00 - 0.05 10*3/mm3 Final    nRBC 10/18/2023 0.0  0.0 - 0.2 /100 WBC Final       IR Port Placement    Result Date: 10/18/2023  Narrative: IR PORT PLACEMENT  History: lung cancer  : Kip Gutiérrez MD.  Modality: Sonography and fluoroscopy  DOSE REDUCTION: The examination was performed according to departmental dose-optimization program which includes automated exposure control, adjustment of the mA and/or kV. Fluoro time: 0.2 minutes Radiation dose: 1.4 mGy air Kerma.  SEDATION: Moderate sedation was administered. 2 milligram of Versed and 100 micrograms of fentanyl IV was used for moderate sedation. Total intra service time of sedation was 18 minutes. The sedation was administered and the patient's vital signs monitored throughout the procedure and recorded in the patient's medical record by the nurse under my direct supervision.  Anesthesia: Lidocaine 1% with epinephrine, local infiltration Medicines: None      Estimated blood loss:  < 5 cc.        Technique: A thorough discussion of the risks, benefits, and alternatives of the procedure, and if applicable, moderate sedation, was carried out with the patient. They were encouraged to ask any questions. Any questions were answered. They verbalized understanding. A written  informed consent was then signed.  A multi-component timeout was performed prior to starting the procedure using the departmental protocol. The procedure room personnel used personal protective equipment. The operators used sterile gloves and if indicated, sterile gowns. The surgical site was prepped with chlorhexidine gluconate  and draped in the maximal applicable sterile fashion. A preliminary ultrasonogram was performed of the neck that revealed a patent and compressible internal jugular vein. Pertinent ultrasound images were stored in the PACS for documentation. Using aseptic precautions, real-time ultrasound guidance, the internal jugular vein was accessed with a micropuncture needle after local anesthetic infiltration. A 018 guidewire was advanced into the central venous system under fluoroscopic guidance. Over the wire a micropuncture sheath was placed. Through the micropuncture sheath, a 035 wire was advanced into the venous system under fluoroscopic guidance. Over the wire a peel-away sheath was placed. After local anesthesia, using sharp and blunt dissection, a reservoir pocket of appropriate size was created in the infra clavicular chest wall. The port catheter was connected to the port reservoir. The catheter was tunneled to the venotomy site using a  tunneling device. The the reservoir was placed in the reservoir pocket. The catheter was trimmed to an appropriate length. The catheter was advanced into the venous system through the peel-away sheath which was removed. The port aspirated and flushed well and was terminally packed with heparin 100 units per cc, total 500 units. The port reservoir was irrigated with saline. The incision was closed in layers using absorbable suture and Dermabond. The venotomy site was closed using Dermabond. An aseptic dressing was applied using the protocol for Dermabond. The patient was transferred to the recovery area and was discharged from the department in stable  condition.  Complications: None immediate.    Findings: Patent and compressible internal jugular vein. Final image shows the shaggy catheter to be in good position with the catheter tip at the cavoatrial junction/right atrium, an excellent position for use. There is no immediate complication.      Impression: Impression:    Successful ultrasound and fluoroscopic guided left internal jugular vein route single lumen Port-A-Cath placement as described above. There is a right-sided pacemaker in place. Thank you for the opportunity to assist in the care of your patient. Electronically Signed: Kip Gutiérrez MD  10/18/2023 3:50 PM EDT  Workstation ID: [id number]     Procedures    Bronch 6/2020  Findings:       An EBUS bronchoscope was advanced through the endotracheal tube under        general anesthesia. A comprehensive EBUS survey of all available lymph        node stations revealed lymphadenopathy with benign sonographic features        in stations 11L (9 mm), 4L (8 mm), 7 (15 mm), 4R (11 mm), and 11R (10        mm). EBUS-TBNA x 4 passes (at least) was done at each of those stations        in that sequence with the Olympus ViziShot 21G and 22G needles.       We then withdrew the EBUS scope and inserted a therapeutic scope into        the airway. A thorough airway exam to the first subsegmental level was        unremarkable without endobronchial lesions or secretions. A BAL was        obtained from the right lower lobe superior segment (RB6) (90 ml        instilled, 18 ml of cloudy fluid returned). Adequate hemostasis was        confirmed prior to withdrawing the scope. Patient tolerated procedure.  Impression:           Abnormal CT scan of chest                        1. Successful endobronchial ultrasound bronchoscopy                         with fine needle aspiration.                        2. Mediastinal/hilar lymphadenopathy with benign                         sonographic features in stations 11L, 4L, 7, 4R and  11R.                        3. EBUS-TBNA samples from stations 11L, 4L, 7, 4R and                         11R.                        4. Normal airway anatomy without active bleeding,                         endobronchial lesions or secretions.                        5. BAL from RLL     11R Lymph Node, (EBUS) Fine Needle Aspiration:  - Negative for malignancy  - Abundant lymphoid tissue and histiocytes with anthracotic pigments.     4R Lymph Node, (EBUS) Fine Needle Aspiration:  - Negative for malignancy.  - Abundant lymphoid tissue present.     Subcarinal Lymph Node, (EBUS) Fine Needle Aspiration:  - Negative for malignancy  - Abundant lymphoid tissue present.     4L Lymph Node, (EBUS) Fine Needle Aspiration:  - Negative for malignancy  - Abundant lymphoid tissue.     11L Lymph Node, (EBUS) Fine Needle Aspiration:  - Negative for malignancy.  - Abundant lymphoid tissue and histiocytes with anthracotic pigments.     Bronchoalveolar Lavage, RLL (ThinPrep only):  NO MALIGNANT CELLS IDENTIFIED  Benign respiratory epithelial cells and pulmonary macrophages       Microbiology Results     Date and Time Order Name Sensitivity Status Organisms Specimen ID Source     6/29/2020 1030 Fungus Culture and Smear   Preliminary   [id number] Lung     6/29/2020 1030 BAL/Brush Culture plus Stain, Semiquant.   Final   [id number] Lung     6/29/2020 1030 Acid Fast Culture Plus Stain   Preliminary   [id number] Lung     Assessment / Plan      Assessment/Plan:     Nonsmall cell lung cancer of the RLL, Stage IIIA  Chemo immunotherapy monitoring  -He has an enlarging RLL nodule with SUV of 17. Despite negative transbronchial biopsy, he was found to have N2 disease with a positive level 7 LN. We discussed options for definitive treatment to include induction chemo immunotherapy followed by surgical resection, chemoradiation followed by surgical resection, or definitive chemoradiation. We discussed differences in schedules and side effects. He has no  contraindications to immunotherapy and I recommended nivolumab + chemotherapy induction.  His case was reviewed at multidisciplinary tumor board including thoracic surgery.  He was felt to be a good candidate for neoadjuvant chemo immunotherapy followed by resection assessment.  -I recommended nivolumab, carboplatin, paclitaxel x3 cycles followed by repeat imaging.   -Tempus reviewed and shows TP53 mutatation  -I reviewed his PFTs and discussed with thoracic surgery.  His lung function is adequate for neoadjuvant chemoimmunotherapy approach.  Tentatively plan posttreatment PET/CT followed by surgical resection after completion of 3 cycles of chemoimmunotherapy.  -CBC shows expected neutropenia which remains in the mild range.  Discussed risk for severe or life-threatening infection due to immunosuppression following chemotherapy.  Stressed the importance of following medical advice following treatment in order to proceed with chemotherapy safely.  -Return to clinic in 2 weeks for consideration of neck cycle of chemotherapy.  -Brain MRI negative    3.  Cellulitis in base of toe  -This developed shortly after discontinuing Keflex for a port site infection.  X-ray today shows no underlying osteomyelitis.  -He has full range of motion in his joints but soft tissue edema.  - If symptoms fail to resolve with treatment for infection consider gout. I ordered a uric acid which is pending.  -I started him on Bactrim and asked him to return if worsening symptoms or if symptoms fail to improve    4.  Chemotherapy-induced nausea  -Continue scheduled Zofran, tolerating well.  Increase fluid intake.  Option for IV fluids if unable to escalate oral fluids.  Creatinine is within the limits    5.  Mild mucositis  -Add as needed Magic mouthwash    6.  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  I discussed with genetic counselor.  Germline mutation felt to be low risk based on the specific variants identified on his testing, but elected to proceed with genetic counseling and consideration of germline testing to further evaluate.    Follow Up:   2 weeks with labs for treatment or sooner if worsening symptoms     Neetu Ashley MD  Hematology and Oncology     I have spent a total of 40 min on the day of service including time before, during, and after the office visit on reviewing test results, preparing to see patient, counseling patient, performing medically appropriate exam, placing orders, coordinating care and documenting clinical information in the electronic or other health record

## 2023-11-01 ENCOUNTER — CLINICAL SUPPORT (OUTPATIENT)
Dept: GENETICS | Facility: HOSPITAL | Age: 73
End: 2023-11-01
Payer: MEDICARE

## 2023-11-01 DIAGNOSIS — Z80.6 FAMILY HISTORY OF LEUKEMIA: ICD-10-CM

## 2023-11-01 DIAGNOSIS — C34.90 MALIGNANT NEOPLASM OF LUNG, UNSPECIFIED LATERALITY, UNSPECIFIED PART OF LUNG: Primary | ICD-10-CM

## 2023-11-02 NOTE — PROGRESS NOTES
David Barfield, a 73-year-old male, was referred for genetic counseling due to a personal and family history of cancer.  Genetic counseling was provided via telehealth, and the patient confirmed his name, date of birth, and that he was located in the [facility] at the time of the appointment.  Mr. Barfield was diagnosed with lung cancer at age 73.  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 Mr. Barfield was interested in discussing the family history and indications for germline genetic testing.  No germline genetic testing was ordered at this time.     PERTINENT FAMILY HISTORY: (See attached pedigree)   Sister:  Leukemia, 21  Mat. Grandfather: Possible cancer diagnosis    We do not have medical records to confirm these diagnoses.          RISK ASSESSMENT:  Mr. Barfield's personal and family history of cancer in combination with tumor testing raised the question of the need for germline genetic testing.  In cases where there is a mutation identified through tumor testing in a gene that is not commonly identified to have somatic mutations, and the variant allele frequency is greater than 20%, germline testing should be considered. In Mr. Barfield's case, there were three TP53 mutations identified, which is a gene frequently identified to have somatic mutations, and the allele frequency was less than 1% for each of the identified mutations.  While his sister did have a leukemia diagnosed at age 21, neither of his parents had a cancer diagnosis and lived until their 80s, and his lung cancer diagnosis was at age 73.  In most cases, lung cancer is not identified to have a hereditary cause, and is generally related to environmental exposure.  Germline mutations in the TP53 gene are associated with Li Fraumeni syndrome, a hereditary cancer syndrome that is associated with a very elevated risk for a  variety of cancers at significantly earlier ages that what is typical for the cancer type.  The low allele frequency in combination with Mr. Barfield's age of diagnosis, as well as the large number of unaffected relatives in the family does not raise suspicion for Li Fraumeni syndrome.  We discussed that there can be variability in how hereditary cancer syndromes present, therefore the option of genetic testing is still available despite the low likelihood of a hereditary cancer syndrome.  Mr. Barfield declined germline genetic testing at this time.     This risk assessment is based on the family history information provided at the time of the appointment.  The assessment could change in the future should new information be obtained.    GENETIC COUNSELING:  We reviewed the family history information in detail.  Cancer is very common; approximately 1 in 3 individuals will develop cancer within a lifetime.  Most often, we believe cancer to be a multifactorial disease caused by an accumulation of genetic alterations acquired over our lifetime, with environmental factors acting in the development of a malignancy. Cases of cancer follow three general patterns: sporadic, familial, and hereditary.  While most types of cancer are sporadic (75-80%), some cases appear to occur in family clusters.  Familial cases may be due to a combination of shared genes and environmental factors among family members.  In even fewer cases (5-10%), the cancer is said to be inherited, and the genes responsible for the cancer are known.      The pedigree patterns observed in sporadic versus hereditary cancer families were reviewed.  Hereditary cancer represents about 5-10% of all cancer.  Family histories typical of hereditary cancer syndromes usually include multiple first- and second-degree relatives diagnosed with cancer types that define a syndrome.  These cases tend to be diagnosed at younger-than-expected ages and can be bilateral or  multifocal.  The cancer in these families follows an autosomal dominant inheritance pattern, which indicates the likely presence of a mutation in a cancer susceptibility gene.  Children and siblings of an individual known to carry a mutation have a 50% chance of inheriting the mutation, thereby inheriting the increased risk to develop related cancers.  The National [employer] has established guidelines for testing hereditary cancer syndromes, outlining the criteria for considering genetic testing.  Mr. Barfield does not meet criteria for genetic testing based on his personal and family history. The likelihood of a hereditary cancer syndrome contributing to the diagnoses in the family is very low.     PLAN:  Genetic counseling remains available to Mr. Barfield.  If new information becomes available regarding Mr. Barfield's personal or family history, we would be happy to reevaluate the family's risk for a hereditary cancer syndrome.  Mr. Barfield is welcome to contact us with any questions or concerns at [phone].      Maria L Verma MS, [facility] [place] [place]  Licensed Certified Genetic Counselor      Cc:  David Ashley MD

## 2023-11-06 DIAGNOSIS — C34.91 BRONCHOGENIC CANCER OF RIGHT LUNG: Primary | ICD-10-CM

## 2023-11-13 ENCOUNTER — HOSPITAL ENCOUNTER (OUTPATIENT)
Dept: ONCOLOGY | Facility: HOSPITAL | Age: 73
Discharge: HOME | End: 2023-11-13
Admitting: INTERNAL MEDICINE
Payer: MEDICARE

## 2023-11-13 VITALS
BODY MASS INDEX: 26.28 KG/M2 | SYSTOLIC BLOOD PRESSURE: 122 MMHG | HEART RATE: 80 BPM | WEIGHT: 194 LBS | DIASTOLIC BLOOD PRESSURE: 69 MMHG | TEMPERATURE: 97.6 F | HEIGHT: 72 IN | RESPIRATION RATE: 16 BRPM

## 2023-11-13 DIAGNOSIS — L03.119 CELLULITIS OF FOOT: ICD-10-CM

## 2023-11-13 DIAGNOSIS — C34.31 MALIGNANT NEOPLASM OF LOWER LOBE OF RIGHT LUNG: Primary | ICD-10-CM

## 2023-11-13 LAB
ALBUMIN SERPL-MCNC: 3.8 G/DL (ref 3.5–5.2)
ALBUMIN/GLOB SERPL: 1.2 G/DL
ALP SERPL-CCNC: 97 U/L (ref 39–117)
ALT SERPL W P-5'-P-CCNC: 10 U/L (ref 1–41)
ANION GAP SERPL CALCULATED.3IONS-SCNC: 9 MMOL/L (ref 5–15)
AST SERPL-CCNC: 13 U/L (ref 1–40)
BASOPHILS # BLD AUTO: 0.03 10*3/MM3 (ref 0–0.2)
BASOPHILS NFR BLD AUTO: 0.4 % (ref 0–1.5)
BILIRUB SERPL-MCNC: 0.2 MG/DL (ref 0–1.2)
BUN SERPL-MCNC: 7 MG/DL (ref 8–23)
BUN/CREAT SERPL: 8.4 (ref 7–25)
CALCIUM SPEC-SCNC: 9.2 MG/DL (ref 8.6–10.5)
CHLORIDE SERPL-SCNC: 102 MMOL/L (ref 98–107)
CO2 SERPL-SCNC: 26 MMOL/L (ref 22–29)
CREAT SERPL-MCNC: 0.83 MG/DL (ref 0.76–1.27)
DEPRECATED RDW RBC AUTO: 48.2 FL (ref 37–54)
EGFRCR SERPLBLD CKD-EPI 2021: 91.8 ML/MIN/1.73
EOSINOPHIL # BLD AUTO: 0.03 10*3/MM3 (ref 0–0.4)
EOSINOPHIL NFR BLD AUTO: 0.4 % (ref 0.3–6.2)
ERYTHROCYTE [DISTWIDTH] IN BLOOD BY AUTOMATED COUNT: 13.7 % (ref 12.3–15.4)
GLOBULIN UR ELPH-MCNC: 3.3 GM/DL
GLUCOSE SERPL-MCNC: 107 MG/DL (ref 65–99)
HCT VFR BLD AUTO: 38.4 % (ref 37.5–51)
HGB BLD-MCNC: 12.9 G/DL (ref 13–17.7)
IMM GRANULOCYTES # BLD AUTO: 0.03 10*3/MM3 (ref 0–0.05)
IMM GRANULOCYTES NFR BLD AUTO: 0.4 % (ref 0–0.5)
LYMPHOCYTES # BLD AUTO: 2.22 10*3/MM3 (ref 0.7–3.1)
LYMPHOCYTES NFR BLD AUTO: 29.4 % (ref 19.6–45.3)
MCH RBC QN AUTO: 31.9 PG (ref 26.6–33)
MCHC RBC AUTO-ENTMCNC: 33.6 G/DL (ref 31.5–35.7)
MCV RBC AUTO: 95 FL (ref 79–97)
MONOCYTES # BLD AUTO: 1.35 10*3/MM3 (ref 0.1–0.9)
MONOCYTES NFR BLD AUTO: 17.9 % (ref 5–12)
NEUTROPHILS NFR BLD AUTO: 3.88 10*3/MM3 (ref 1.7–7)
NEUTROPHILS NFR BLD AUTO: 51.5 % (ref 42.7–76)
PLATELET # BLD AUTO: 260 10*3/MM3 (ref 140–450)
PMV BLD AUTO: 8 FL (ref 6–12)
POTASSIUM SERPL-SCNC: 4.3 MMOL/L (ref 3.5–5.2)
PROT SERPL-MCNC: 7.1 G/DL (ref 6–8.5)
RBC # BLD AUTO: 4.04 10*6/MM3 (ref 4.14–5.8)
SODIUM SERPL-SCNC: 137 MMOL/L (ref 136–145)
T4 FREE SERPL-MCNC: 1 NG/DL (ref 0.93–1.7)
TSH SERPL DL<=0.05 MIU/L-ACNC: 2.07 UIU/ML (ref 0.27–4.2)
URATE SERPL-MCNC: 4.2 MG/DL (ref 3.4–7)
WBC NRBC COR # BLD: 7.54 10*3/MM3 (ref 3.4–10.8)

## 2023-11-13 PROCEDURE — 84443 ASSAY THYROID STIM HORMONE: CPT | Performed by: INTERNAL MEDICINE

## 2023-11-13 PROCEDURE — 80053 COMPREHEN METABOLIC PANEL: CPT | Performed by: INTERNAL MEDICINE

## 2023-11-13 PROCEDURE — 84439 ASSAY OF FREE THYROXINE: CPT | Performed by: INTERNAL MEDICINE

## 2023-11-13 PROCEDURE — 25010000002 HEPARIN LOCK FLUSH PER 10 UNITS: Performed by: INTERNAL MEDICINE

## 2023-11-13 PROCEDURE — 36591 DRAW BLOOD OFF VENOUS DEVICE: CPT

## 2023-11-13 PROCEDURE — 85025 COMPLETE CBC W/AUTO DIFF WBC: CPT | Performed by: INTERNAL MEDICINE

## 2023-11-13 PROCEDURE — 84550 ASSAY OF BLOOD/URIC ACID: CPT | Performed by: INTERNAL MEDICINE

## 2023-11-13 RX ORDER — SODIUM CHLORIDE 0.9 % (FLUSH) 0.9 %
10 SYRINGE (ML) INJECTION AS NEEDED
Status: CANCELLED | OUTPATIENT
Start: 2023-11-13

## 2023-11-13 RX ORDER — HEPARIN SODIUM (PORCINE) LOCK FLUSH IV SOLN 100 UNIT/ML 100 UNIT/ML
500 SOLUTION INTRAVENOUS AS NEEDED
Status: DISCONTINUED | OUTPATIENT
Start: 2023-11-13 | End: 2023-11-14 | Stop reason: HOSPADM

## 2023-11-13 RX ORDER — HEPARIN SODIUM (PORCINE) LOCK FLUSH IV SOLN 100 UNIT/ML 100 UNIT/ML
500 SOLUTION INTRAVENOUS AS NEEDED
Status: CANCELLED | OUTPATIENT
Start: 2023-11-13

## 2023-11-13 RX ORDER — SODIUM CHLORIDE 0.9 % (FLUSH) 0.9 %
10 SYRINGE (ML) INJECTION AS NEEDED
Status: DISCONTINUED | OUTPATIENT
Start: 2023-11-13 | End: 2023-11-14 | Stop reason: HOSPADM

## 2023-11-13 RX ADMIN — HEPARIN 500 UNITS: 100 SYRINGE at 10:12

## 2023-11-13 RX ADMIN — Medication 10 ML: at 10:12

## 2023-11-14 ENCOUNTER — HOSPITAL ENCOUNTER (OUTPATIENT)
Dept: ONCOLOGY | Facility: HOSPITAL | Age: 73
Discharge: HOME | End: 2023-11-14
Admitting: INTERNAL MEDICINE
Payer: MEDICARE

## 2023-11-14 ENCOUNTER — APPOINTMENT (OUTPATIENT)
Dept: ONCOLOGY | Facility: HOSPITAL | Age: 73
End: 2023-11-14
Payer: MEDICARE

## 2023-11-14 ENCOUNTER — TELEPHONE (OUTPATIENT)
Dept: CARDIAC SURGERY | Facility: CLINIC | Age: 73
End: 2023-11-14
Payer: MEDICARE

## 2023-11-14 ENCOUNTER — OFFICE VISIT (OUTPATIENT)
Dept: ONCOLOGY | Facility: CLINIC | Age: 73
End: 2023-11-14
Payer: MEDICARE

## 2023-11-14 VITALS
BODY MASS INDEX: 26.28 KG/M2 | SYSTOLIC BLOOD PRESSURE: 120 MMHG | TEMPERATURE: 97.1 F | DIASTOLIC BLOOD PRESSURE: 76 MMHG | RESPIRATION RATE: 16 BRPM | HEIGHT: 72 IN | HEART RATE: 72 BPM | OXYGEN SATURATION: 98 % | WEIGHT: 194 LBS

## 2023-11-14 VITALS — DIASTOLIC BLOOD PRESSURE: 81 MMHG | HEART RATE: 75 BPM | SYSTOLIC BLOOD PRESSURE: 136 MMHG

## 2023-11-14 DIAGNOSIS — C34.31 MALIGNANT NEOPLASM OF LOWER LOBE OF RIGHT LUNG: Primary | ICD-10-CM

## 2023-11-14 DIAGNOSIS — Z09 CHEMOTHERAPY FOLLOW-UP EXAMINATION: ICD-10-CM

## 2023-11-14 PROCEDURE — 96413 CHEMO IV INFUSION 1 HR: CPT

## 2023-11-14 PROCEDURE — 25010000002 PALONOSETRON PER 25 MCG: Performed by: INTERNAL MEDICINE

## 2023-11-14 PROCEDURE — 25010000002 DEXAMETHASONE PER 1 MG: Performed by: INTERNAL MEDICINE

## 2023-11-14 PROCEDURE — 25010000002 CARBOPLATIN PER 50 MG: Performed by: INTERNAL MEDICINE

## 2023-11-14 PROCEDURE — 25010000002 FOSAPREPITANT PER 1 MG: Performed by: INTERNAL MEDICINE

## 2023-11-14 PROCEDURE — 25810000003 SODIUM CHLORIDE 0.9 % SOLUTION 250 ML FLEX CONT: Performed by: INTERNAL MEDICINE

## 2023-11-14 PROCEDURE — 25810000003 SODIUM CHLORIDE 0.9 % SOLUTION: Performed by: INTERNAL MEDICINE

## 2023-11-14 PROCEDURE — 25010000002 NIVOLUMAB 40 MG/4ML SOLUTION 4 ML VIAL: Performed by: INTERNAL MEDICINE

## 2023-11-14 PROCEDURE — 25010000002 NIVOLUMAB 240 MG/24ML SOLUTION 24 ML VIAL: Performed by: INTERNAL MEDICINE

## 2023-11-14 PROCEDURE — 25010000002 PACLITAXEL PER 1 MG: Performed by: INTERNAL MEDICINE

## 2023-11-14 PROCEDURE — 25010000002 HEPARIN LOCK FLUSH PER 10 UNITS: Performed by: INTERNAL MEDICINE

## 2023-11-14 PROCEDURE — 25810000003 SODIUM CHLORIDE 0.9 % SOLUTION 500 ML FLEX CONT: Performed by: INTERNAL MEDICINE

## 2023-11-14 PROCEDURE — 96417 CHEMO IV INFUS EACH ADDL SEQ: CPT

## 2023-11-14 PROCEDURE — 96415 CHEMO IV INFUSION ADDL HR: CPT

## 2023-11-14 PROCEDURE — 25010000002 DIPHENHYDRAMINE PER 50 MG: Performed by: INTERNAL MEDICINE

## 2023-11-14 PROCEDURE — 96367 TX/PROPH/DG ADDL SEQ IV INF: CPT

## 2023-11-14 PROCEDURE — 96375 TX/PRO/DX INJ NEW DRUG ADDON: CPT

## 2023-11-14 RX ORDER — FAMOTIDINE 10 MG/ML
20 INJECTION, SOLUTION INTRAVENOUS AS NEEDED
Status: DISCONTINUED | OUTPATIENT
Start: 2023-11-14 | End: 2023-11-15 | Stop reason: HOSPADM

## 2023-11-14 RX ORDER — SODIUM CHLORIDE 0.9 % (FLUSH) 0.9 %
10 SYRINGE (ML) INJECTION AS NEEDED
OUTPATIENT
Start: 2023-11-14

## 2023-11-14 RX ORDER — FAMOTIDINE 10 MG/ML
20 INJECTION, SOLUTION INTRAVENOUS ONCE
Status: COMPLETED | OUTPATIENT
Start: 2023-11-14 | End: 2023-11-14

## 2023-11-14 RX ORDER — PALONOSETRON 0.05 MG/ML
0.25 INJECTION, SOLUTION INTRAVENOUS ONCE
Status: COMPLETED | OUTPATIENT
Start: 2023-11-14 | End: 2023-11-14

## 2023-11-14 RX ORDER — HEPARIN SODIUM (PORCINE) LOCK FLUSH IV SOLN 100 UNIT/ML 100 UNIT/ML
500 SOLUTION INTRAVENOUS AS NEEDED
OUTPATIENT
Start: 2023-11-14

## 2023-11-14 RX ORDER — DIPHENHYDRAMINE HYDROCHLORIDE 50 MG/ML
50 INJECTION INTRAMUSCULAR; INTRAVENOUS AS NEEDED
Status: DISCONTINUED | OUTPATIENT
Start: 2023-11-14 | End: 2023-11-15 | Stop reason: HOSPADM

## 2023-11-14 RX ORDER — FAMOTIDINE 10 MG/ML
20 INJECTION, SOLUTION INTRAVENOUS AS NEEDED
Status: CANCELLED | OUTPATIENT
Start: 2023-11-14

## 2023-11-14 RX ORDER — SODIUM CHLORIDE 9 MG/ML
20 INJECTION, SOLUTION INTRAVENOUS ONCE
Status: CANCELLED | OUTPATIENT
Start: 2023-11-14

## 2023-11-14 RX ORDER — HEPARIN SODIUM (PORCINE) LOCK FLUSH IV SOLN 100 UNIT/ML 100 UNIT/ML
500 SOLUTION INTRAVENOUS AS NEEDED
Status: DISCONTINUED | OUTPATIENT
Start: 2023-11-14 | End: 2023-11-15 | Stop reason: HOSPADM

## 2023-11-14 RX ORDER — SODIUM CHLORIDE 9 MG/ML
20 INJECTION, SOLUTION INTRAVENOUS ONCE
Status: COMPLETED | OUTPATIENT
Start: 2023-11-14 | End: 2023-11-14

## 2023-11-14 RX ORDER — DIPHENHYDRAMINE HYDROCHLORIDE 50 MG/ML
50 INJECTION INTRAMUSCULAR; INTRAVENOUS AS NEEDED
Status: CANCELLED | OUTPATIENT
Start: 2023-11-14

## 2023-11-14 RX ORDER — FAMOTIDINE 10 MG/ML
20 INJECTION, SOLUTION INTRAVENOUS ONCE
Status: CANCELLED | OUTPATIENT
Start: 2023-11-14

## 2023-11-14 RX ORDER — PALONOSETRON 0.05 MG/ML
0.25 INJECTION, SOLUTION INTRAVENOUS ONCE
Status: CANCELLED | OUTPATIENT
Start: 2023-11-14

## 2023-11-14 RX ADMIN — PACLITAXEL 365 MG: 6 INJECTION, SOLUTION, CONCENTRATE INTRAVENOUS at 12:12

## 2023-11-14 RX ADMIN — HEPARIN 500 UNITS: 100 SYRINGE at 16:25

## 2023-11-14 RX ADMIN — SODIUM CHLORIDE 360 MG: 9 INJECTION, SOLUTION INTRAVENOUS at 10:20

## 2023-11-14 RX ADMIN — DIPHENHYDRAMINE HYDROCHLORIDE 50 MG: 50 INJECTION INTRAMUSCULAR; INTRAVENOUS at 11:02

## 2023-11-14 RX ADMIN — DEXAMETHASONE SODIUM PHOSPHATE 20 MG: 4 INJECTION, SOLUTION INTRAMUSCULAR; INTRAVENOUS at 11:02

## 2023-11-14 RX ADMIN — CARBOPLATIN 610 MG: 10 INJECTION, SOLUTION INTRAVENOUS at 15:43

## 2023-11-14 RX ADMIN — FAMOTIDINE 20 MG: 10 INJECTION, SOLUTION INTRAVENOUS at 10:58

## 2023-11-14 RX ADMIN — PALONOSETRON HYDROCHLORIDE 0.25 MG: 0.25 INJECTION INTRAVENOUS at 10:56

## 2023-11-14 RX ADMIN — FOSAPREPITANT 150 MG: 150 INJECTION, POWDER, LYOPHILIZED, FOR SOLUTION INTRAVENOUS at 11:20

## 2023-11-14 RX ADMIN — SODIUM CHLORIDE 20 ML/HR: 9 INJECTION, SOLUTION INTRAVENOUS at 10:18

## 2023-11-14 NOTE — PROGRESS NOTES
Hematology and Oncology [place]  Office number [phone]    Fax number [phone]     Follow up     Date: 23    Patient Name: David Barfield  MRN: [id number]  : 1950    Referring Physician: Dr. Sampson    Chief Complaint: Nonsmall cell lung cancer    Cancer Staging:  Cancer Staging   Stage IIIA (cT2b, cN2, cM0)    History of Present Illness: David Barfield is a pleasant 73 y.o. male who presents today for evaluation of NSCLC. He has a 50 pack year smoking history.    He has a history of a PET avid subpleural RLL lung nod[facility] [place] [facility]. This had an SUV of 9.8 on PET  in association with increased mediastinal LN uptake with SUV around 3. Imaging was felt secondary to osteophyte fibrosis. He did undergo bronchoscopy 2020 with negative cytology. The RLL lung nodule was not felt amenable to bronchoscopy biopsy.    CT lung screen 2023 showed:      PET CT showed mass in the RLL intensely SUV avid, max 17. Mediastinal LN not hypermetabolic above baseline.     Right lower lobe robotic transbronchial biopsy and cytology on 9/15/2023 showed benign findings. Cultures including AFT and fungal were negative.  Station 11L, Station 4L LN negative  Station 7 LN showed NSCLCa (positive for cytokeratin 7, p63, and TTF-1 with no significant staining for p40 or Napsin. The staining pattern raises the possibility of mixed adenocarcinoma and squamous cell carcinoma differentiation in this tumor). PDL1 negative.    MRI brain was negative.    He is here for an opinion regarding management of newly diagnosed lung cancer.    He has a history of sick sinus syndrome s/p PPM and moderate COPD. He describes only mild physical limitations from this. For example he recently walked 1.5 miles at [facility]. At home, he climbs 17 steps without issue. He does sit down with long activities.     Treatment history:  Carbo, taxol, nivo, C1 10/24/23; C2  11/15/23    Interval history:   He presents for follow-up and consideration of cycle 2 of chemo immunotherapy.    Running out steam faster, but still able to stain fence, rake leaves and put up Berry tree in last few days. No dizziness, lightheadedness or chest pain or SOA.   Diarrhea after chemo, was controlled with imodium.   No vomiting, mild nausea was controlled with zofran.   Rash has fully resolved on left foot. No erythema or pain in foot. Able to bear weight. Podiatry appt pending.   No GERD  No neuropathy  No fever of infectious symptoms.     Past Medical History:   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    Diabetes mellitus Borderline    Emphysema/COPD     Erectile disorder     GERD (gastroesophageal reflux disease)     Hyperlipidemia     Hypertension 2019    Lung nodule     Mumps     Mumps     Pruritus     after bath    Slow to wake up after anesthesia     Wears dentures     upper only    Wears hearing aid in both ears     usually only wears right   No personal history of myocardial infarction, cerebrovascular event, or venous thromboembolism.  No autoimmune diseases.   No prior cscope, mailed cologuard recently awaiting results.      Past Surgical History:   Past Surgical History:   Procedure Laterality Date    BONE BIOPSY      broken bone surgery in his face    BRONCHOSCOPY WITH ION ROBOTIC ASSIST N/A 9/15/2023    Procedure: BRONCHOSCOPY NAVIGATION WITH ENDOBRONCHIAL ULTRASOUND AND ION ROBOT;  Surgeon: Octaviano Sampson MD;  Location:  [facility];  Service: Robotics - Pulmonary;  Laterality: N/A;  ion #6 - 0032  - 0015  Cath guide 0061    EBUS balloon removed and intact    CARDIAC ELECTROPHYSIOLOGY PROCEDURE N/A 08/17/2021    Procedure: Pacemaker DC new;  Surgeon: Kayy Box MD;  Location:  [facility];  Service: Cardiology;  Laterality: N/A;     FACIAL FRACTURE SURGERY      INSERT / REPLACE / REMOVE PACEMAKER  August 17, 2021       Family History:   Family History   Problem Relation Age of Onset    Aneurysm Mother         brain    Dementia Father     Leukemia Sister     Hypertension Paternal Grandfather     Heart disease Paternal Grandmother    Sister leukemia at age 21  No other malignancy    Social History:   Social History     Socioeconomic History    Marital status:     Number of children: 3   Tobacco Use    Smoking status: Every Day     Packs/day: 1.00     Years: 53.00     Additional pack years: 0.00     Total pack years: 53.00     Types: Cigarettes     Start date: 1/1/1968    Smokeless tobacco: Never    Tobacco comments:     Still smoke about 1 ppd   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  Rebuilds cars, 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    Medications:     Current Outpatient Medications:     albuterol sulfate  (90 Base) MCG/ACT inhaler, Inhale 2 puffs Every 4 (Four) Hours As Needed for Wheezing., Disp: 18 g, Rfl: 11    fluticasone (VERAMYST) 27.5 MCG/SPRAY nasal spray, 2 sprays into the nostril(s) as directed by provider 1 (One) Time As Needed for Rhinitis or Allergies., Disp: 6 mL, Rfl: 2    Fluticasone-Umeclidin-Vilant (Trelegy Ellipta) 100-62.5-25 MCG/ACT inhaler, Inhale 1 puff Daily., Disp: 3 each, Rfl: 3    lidocaine-prilocaine (EMLA) 2.5-2.5 % cream, Apply 1 application  topically to the appropriate area as directed As Needed (45-60 minutes prior to port access.  Cover with saran/plastic wrap.)., Disp: 30 g, Rfl: 3    lisinopril (PRINIVIL,ZESTRIL) 2.5 MG tablet, Take 1 tablet by mouth As Needed (elevated blood pressure). Take 1/2 tablet po prn (Patient taking differently: Take 1 tablet by mouth As Needed (elevated blood  "pressure systolic over 140). Take 1/2 tablet po prn), Disp: 30 tablet, Rfl: 1    nystatin susp + lidocaine viscous (MAGIC MOUTHWASH) oral suspension, 5-10 ml swish and spit or swallow QID prn, Disp: 240 mL, Rfl: 3    OLANZapine (zyPREXA) 5 MG tablet, Take 1 tablet by mouth Every Night. Take nightly x 4 starting night of chemotherapy. (Patient not taking: Reported on 10/23/2023), Disp: 4 tablet, Rfl: 2    ondansetron (ZOFRAN) 8 MG tablet, Take 1 tablet by mouth 3 (Three) Times a Day As Needed for Nausea or Vomiting., Disp: 30 tablet, Rfl: 2    pravastatin (PRAVACHOL) 80 MG tablet, Take 1 tablet by mouth Every Night., Disp: 30 tablet, Rfl: 11    sildenafil (VIAGRA) 100 MG tablet, Take 0.5 tablets by mouth Daily As Needed for Erectile Dysfunction., Disp: , Rfl:   No current facility-administered medications for this visit.    Allergies:   Allergies   Allergen Reactions    Latex Other (See Comments)     Latex allergy     Tape Rash       Objective     Vital Signs:   Vitals:    11/14/23 0856   BP: 120/76   Pulse: 72   Resp: 16   Temp: 97.1 °F (36.2 °C)   TempSrc: Infrared   SpO2: 98%   Weight: 88 kg (194 lb)   Height: 182.9 cm (72.01\")   PainSc: 0-No pain    Body mass index is 26.31 kg/m².   Pain Score    11/14/23 0856   PainSc: 0-No pain       ECOG Performance Status: 1 - Symptomatic but completely ambulatory    Physical Exam:   General: No acute distress. Well appearing   HEENT: Normocephalic, atraumatic. Sclera anicteric.   Neck: supple, no adenopathy.   Cardiovascular: regular rate and rhythm. No murmurs.   Respiratory: Normal rate. Clear to auscultation bilaterally  Abdomen: Soft, nontender, non distended with normoactive bowel sounds  Lymph: no cervical, supraclavicular or axillary adenopathy  Neuro: Alert and oriented x 3. No focal deficits.   Ext: Symmetric, no swelling.   Skin: Port site clean dry and intact with no surrounding erythema    Laboratory/Imaging Reviewed:   Hospital Outpatient Visit on 11/13/2023 "   Component Date Value Ref Range Status    Glucose 11/13/2023 107 (H)  65 - 99 mg/dL Final    BUN 11/13/2023 7 (L)  8 - 23 mg/dL Final    Creatinine 11/13/2023 0.83  0.76 - 1.27 mg/dL Final    Sodium 11/13/2023 137  136 - 145 mmol/L Final    Potassium 11/13/2023 4.3  3.5 - 5.2 mmol/L Final    Chloride 11/13/2023 102  98 - 107 mmol/L Final    CO2 11/13/2023 26.0  22.0 - 29.0 mmol/L Final    Calcium 11/13/2023 9.2  8.6 - 10.5 mg/dL Final    Total Protein 11/13/2023 7.1  6.0 - 8.5 g/dL Final    Albumin 11/13/2023 3.8  3.5 - 5.2 g/dL Final    ALT (SGPT) 11/13/2023 10  1 - 41 U/L Final    AST (SGOT) 11/13/2023 13  1 - 40 U/L Final    Alkaline Phosphatase 11/13/2023 97  39 - 117 U/L Final    Total Bilirubin 11/13/2023 0.2  0.0 - 1.2 mg/dL Final    Globulin 11/13/2023 3.3  gm/dL Final    Calculated Result    A/G Ratio 11/13/2023 1.2  g/dL Final    BUN/Creatinine Ratio 11/13/2023 8.4  7.0 - 25.0 Final    Anion Gap 11/13/2023 9.0  5.0 - 15.0 mmol/L Final    eGFR 11/13/2023 91.8  >60.0 mL/min/1.73 Final    TSH 11/13/2023 2.070  0.270 - 4.200 uIU/mL Final    Free T4 11/13/2023 1.00  0.93 - 1.70 ng/dL Final    Uric Acid 11/13/2023 4.2  3.4 - 7.0 mg/dL Final    Falsely depressed results may occur on samples drawn from patients receiving N-Acetylcysteine (NAC) or Metamizole.    WBC 11/13/2023 7.54  3.40 - 10.80 10*3/mm3 Final    RBC 11/13/2023 4.04 (L)  4.14 - 5.80 10*6/mm3 Final    Hemoglobin 11/13/2023 12.9 (L)  13.0 - 17.7 g/dL Final    Hematocrit 11/13/2023 38.4  37.5 - 51.0 % Final    MCV 11/13/2023 95.0  79.0 - 97.0 fL Final    MCH 11/13/2023 31.9  26.6 - 33.0 pg Final    MCHC 11/13/2023 33.6  31.5 - 35.7 g/dL Final    RDW 11/13/2023 13.7  12.3 - 15.4 % Final    RDW-SD 11/13/2023 48.2  37.0 - 54.0 fl Final    MPV 11/13/2023 8.0  6.0 - 12.0 fL Final    Platelets 11/13/2023 260  140 - 450 10*3/mm3 Final    Neutrophil % 11/13/2023 51.5  42.7 - 76.0 % Final    Lymphocyte % 11/13/2023 29.4  19.6 - 45.3 % Final    Monocyte %  11/13/2023 17.9 (H)  5.0 - 12.0 % Final    Eosinophil % 11/13/2023 0.4  0.3 - 6.2 % Final    Basophil % 11/13/2023 0.4  0.0 - 1.5 % Final    Immature Grans % 11/13/2023 0.4  0.0 - 0.5 % Final    Neutrophils, Absolute 11/13/2023 3.88  1.70 - 7.00 10*3/mm3 Final    Lymphocytes, Absolute 11/13/2023 2.22  0.70 - 3.10 10*3/mm3 Final    Monocytes, Absolute 11/13/2023 1.35 (H)  0.10 - 0.90 10*3/mm3 Final    Eosinophils, Absolute 11/13/2023 0.03  0.00 - 0.40 10*3/mm3 Final    Basophils, Absolute 11/13/2023 0.03  0.00 - 0.20 10*3/mm3 Final    Immature Grans, Absolute 11/13/2023 0.03  0.00 - 0.05 10*3/mm3 Final       Complete PFT - Pre & Post Bronchodilator    Result Date: 11/3/2023  Narrative: Full pulmonary function testing was done on 10/23/2023.  Please see scanned PFT report for complete details. Interpretation: Mild obstruction without significant response to bronchodilator.  No restriction.  No air trapping or hyperinflation.  DLCO not completed.  Compared to prior study on 8/29/2023, FEV1 is increased by 300 mL.     XR Foot 3+ View Left    Result Date: 10/31/2023  Narrative: XR FOOT 3+ VW LEFT Date of Exam: 10/31/2023 9:24 AM EDT Indication: erythema/swelling at base of great toe Comparison: None available. Findings: 3 views. There are no lytic lesions or erosions. Joint compartments are maintained and normally aligned. There are no fractures. There is mild inferior calcaneal spurring.     Impression: Impression: No acute process. Electronically Signed: Elsa Schmid MD  10/31/2023 3:30 PM EDT  Workstation ID: [id number]    IR Port Placement    Result Date: 10/18/2023  Narrative: IR PORT PLACEMENT  History: lung cancer  : Kip Gutiérrez MD.  Modality: Sonography and fluoroscopy  DOSE REDUCTION: The examination was performed according to departmental dose-optimization program which includes automated exposure control, adjustment of the mA and/or kV. Fluoro time: 0.2 minutes Radiation dose: 1.4 mGy air  Jai.  SEDATION: Moderate sedation was administered. 2 milligram of Versed and 100 micrograms of fentanyl IV was used for moderate sedation. Total intra service time of sedation was 18 minutes. The sedation was administered and the patient's vital signs monitored throughout the procedure and recorded in the patient's medical record by the nurse under my direct supervision.  Anesthesia: Lidocaine 1% with epinephrine, local infiltration Medicines: None      Estimated blood loss:  < 5 cc.        Technique: A thorough discussion of the risks, benefits, and alternatives of the procedure, and if applicable, moderate sedation, was carried out with the patient. They were encouraged to ask any questions. Any questions were answered. They verbalized understanding. A written informed consent was then signed.  A multi-component timeout was performed prior to starting the procedure using the departmental protocol. The procedure room personnel used personal protective equipment. The operators used sterile gloves and if indicated, sterile gowns. The surgical site was prepped with chlorhexidine gluconate  and draped in the maximal applicable sterile fashion. A preliminary ultrasonogram was performed of the neck that revealed a patent and compressible internal jugular vein. Pertinent ultrasound images were stored in the PACS for documentation. Using aseptic precautions, real-time ultrasound guidance, the internal jugular vein was accessed with a micropuncture needle after local anesthetic infiltration. A 018 guidewire was advanced into the central venous system under fluoroscopic guidance. Over the wire a micropuncture sheath was placed. Through the micropuncture sheath, a 035 wire was advanced into the venous system under fluoroscopic guidance. Over the wire a peel-away sheath was placed. After local anesthesia, using sharp and blunt dissection, a reservoir pocket of appropriate size was created in the infra clavicular chest wall.  The port catheter was connected to the port reservoir. The catheter was tunneled to the venotomy site using a  tunneling device. The the reservoir was placed in the reservoir pocket. The catheter was trimmed to an appropriate length. The catheter was advanced into the venous system through the peel-away sheath which was removed. The port aspirated and flushed well and was terminally packed with heparin 100 units per cc, total 500 units. The port reservoir was irrigated with saline. The incision was closed in layers using absorbable suture and Dermabond. The venotomy site was closed using Dermabond. An aseptic dressing was applied using the protocol for Dermabond. The patient was transferred to the recovery area and was discharged from the department in stable condition.  Complications: None immediate.    Findings: Patent and compressible internal jugular vein. Final image shows the shaggy catheter to be in good position with the catheter tip at the cavoatrial junction/right atrium, an excellent position for use. There is no immediate complication.      Impression: Impression:    Successful ultrasound and fluoroscopic guided left internal jugular vein route single lumen Port-A-Cath placement as described above. There is a right-sided pacemaker in place. Thank you for the opportunity to assist in the care of your patient. Electronically Signed: Kip Gutiérrez MD  10/18/2023 3:50 PM EDT  Workstation ID: [id number]     Procedures    Bronch 6/2020  Findings:       An EBUS bronchoscope was advanced through the endotracheal tube under        general anesthesia. A comprehensive EBUS survey of all available lymph        node stations revealed lymphadenopathy with benign sonographic features        in stations 11L (9 mm), 4L (8 mm), 7 (15 mm), 4R (11 mm), and 11R (10        mm). EBUS-TBNA x 4 passes (at least) was done at each of those stations        in that sequence with the Olympus ViziShot 21G and 22G needles.       We then  withdrew the EBUS scope and inserted a therapeutic scope into        the airway. A thorough airway exam to the first subsegmental level was        unremarkable without endobronchial lesions or secretions. A BAL was        obtained from the right lower lobe superior segment (RB6) (90 ml        instilled, 18 ml of cloudy fluid returned). Adequate hemostasis was        confirmed prior to withdrawing the scope. Patient tolerated procedure.  Impression:           Abnormal CT scan of chest                        1. Successful endobronchial ultrasound bronchoscopy                         with fine needle aspiration.                        2. Mediastinal/hilar lymphadenopathy with benign                         sonographic features in stations 11L, 4L, 7, 4R and 11R.                        3. EBUS-TBNA samples from stations 11L, 4L, 7, 4R and                         11R.                        4. Normal airway anatomy without active bleeding,                         endobronchial lesions or secretions.                        5. BAL from RLL     11R Lymph Node, (EBUS) Fine Needle Aspiration:  - Negative for malignancy  - Abundant lymphoid tissue and histiocytes with anthracotic pigments.     4R Lymph Node, (EBUS) Fine Needle Aspiration:  - Negative for malignancy.  - Abundant lymphoid tissue present.     Subcarinal Lymph Node, (EBUS) Fine Needle Aspiration:  - Negative for malignancy  - Abundant lymphoid tissue present.     4L Lymph Node, (EBUS) Fine Needle Aspiration:  - Negative for malignancy  - Abundant lymphoid tissue.     11L Lymph Node, (EBUS) Fine Needle Aspiration:  - Negative for malignancy.  - Abundant lymphoid tissue and histiocytes with anthracotic pigments.     Bronchoalveolar Lavage, RLL (ThinPrep only):  NO MALIGNANT CELLS IDENTIFIED  Benign respiratory epithelial cells and pulmonary macrophages       Microbiology Results     Date and Time Order Name Sensitivity Status Organisms Specimen ID Source      6/29/2020 1030 Fungus Culture and Smear   Preliminary   [id number] Lung     6/29/2020 1030 BAL/Brush Culture plus Stain, Semiquant.   Final   [id number] Lung     6/29/2020 1030 Acid Fast Culture Plus Stain   Preliminary   [id number] Lung     Assessment / Plan      Assessment/Plan:     Nonsmall cell lung cancer of the RLL, Stage IIIA  Chemo immunotherapy monitoring  -He has an enlarging RLL nodule with SUV of 17. Despite negative transbronchial biopsy, he was found to have N2 disease with a positive level 7 LN. We discussed options for definitive treatment to include induction chemo immunotherapy followed by surgical resection, chemoradiation followed by surgical resection, or definitive chemoradiation. We discussed differences in schedules and side effects. He has no contraindications to immunotherapy and I recommended nivolumab + chemotherapy induction.  His case was reviewed at multidisciplinary tumor board including thoracic surgery.  He was felt to be a good candidate for neoadjuvant chemo immunotherapy followed by resection assessment.  -I recommended nivolumab, carboplatin, paclitaxel x3 cycles followed by repeat imaging.   -Tempus reviewed and shows TP53 mutatation  -I reviewed his PFTs and discussed with thoracic surgery.  His lung function is adequate for neoadjuvant chemoimmunotherapy approach.  Tentatively plan posttreatment PET/CT followed by surgical resection after completion of 3 cycles of chemoimmunotherapy.  -CBC, TSH and CMP reviewed and adequate to proceed with cycle #2.   -Follow up in 3 weeks with next cycle unless new/concerning symptoms in interim.  -Brain MRI negative    3.  Cellulitis in base of toe  -Resolved with bactrim  -Uric acid negative.    4.  Chemotherapy-induced nausea  -Schedule zofran     5.  Mild mucositis  -Has as needed Magic mouthwash available    6.  Plantar warts  -Podiatry appt pending    6.  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 He met with genetics. Notes reviewed. Declined germline testing    Follow Up:   3 weeks with cycle 3 with labs     Neetu Ashley MD  Hematology and Oncology

## 2023-11-14 NOTE — TELEPHONE ENCOUNTER
Caller: ISAÍAS ANDERSON    Relationship: Emergency Contact    Best call back number: [phone]    What is the best time to reach you: ANY    Who are you requesting to speak with (clinical staff, provider,  specific staff member): CLINICAL    Do you know the name of the person who called: PT'S S/O    What was the call regarding: PT AND HIS S/O HAVE A FEW QUESTIONS THEY WOULD LIKE TO ASK BEFORE SURGERY. PLEASE GIVE THEM A CALL. THANK YOU.    Is it okay if the provider responds through [facility]: NO             [facility] Daily Progress Note      Admit Date:  12/23/2019    CC:  Follow up SOB and abnormal ECG and low LVEF on echo. Subjective:  Ms. Dior Jiang denies chest pain presently. She cannot lay flat on cath lab table. Objective:   BP (!) 146/90   Pulse 93   Temp 97.1 °F (36.2 °C) (Oral)   Resp 18   Ht 5' 6\" (1.676 m)   Wt (!) 342 lb 6 oz (155.3 kg)   SpO2 93%   BMI 55.26 kg/m²       Intake/Output Summary (Last 24 hours) at 12/27/2019 1447  Last data filed at 12/27/2019 1352  Gross per 24 hour   Intake 490 ml   Output 300 ml   Net 190 ml       TELEMETRY: AF    Physical Exam:  General:  Awake, alert, NAD  Eyes:  EOMI PERRL anicteric  Skin:  Warm and dry. Neck:  JVP elevated. Chest:  Diminshed. No Rales. Cardiovascular: irreg irreg, Normal S1S2.  II/VI HS Murmur. No Rub. No Gallops. Abdomen:  Soft NT  + bs  Extremities:  ++++ edema  Neuro:  CN II-XII intact. No focal deficits. No weakness. No lateralizing findings. Psych:  Normal thought and affect. MSK:  No Cyanosis nor Clubbing.   Symmetrical strength upper and lower extremities    Medications:    hydrALAZINE  50 mg Oral 3 times per day    furosemide  40 mg Intravenous Daily    heparin (porcine)  80 Units/kg Intravenous Once    metoprolol succinate  50 mg Oral Daily    isosorbide mononitrate  30 mg Oral Daily    sodium chloride flush  10 mL Intravenous 2 times per day    insulin lispro  0-6 Units Subcutaneous TID WC    insulin lispro  0-3 Units Subcutaneous Nightly      sodium chloride Stopped (12/27/19 1345)    heparin (porcine)      dextrose       heparin (porcine), heparin (porcine), sodium chloride flush, magnesium hydroxide, ondansetron, acetaminophen, glucose, dextrose, glucagon (rDNA), dextrose    Lab Data:  CBC:   Recent Labs     12/25/19  0432 12/26/19  0430 12/27/19  0529   WBC 6.0 5.9 5.3   HGB 10.2* 10.2* 11.2*   HCT 32.2* 32.4* 35.4*   MCV 77.3* 77.7* 77.6*    236 252     BMP:   Recent Labs 12/25/19  0432 12/26/19  0430 12/27/19  0529    142 139   K 3.8 3.8 3.9   CL 97* 98* 97*   CO2 28 32 30   BUN 28* 32* 28*   CREATININE 1.3* 1.2 0.9     IMAGING: LVEF 35-40%    Assessment:  Patient Active Problem List    Diagnosis Date Noted    Abnormal result of other cardiovascular function study (CODE)      Priority: High    Acute pulmonary edema (HCC)     Acute systolic (congestive) heart failure (HCC)     Atrial fibrillation with RVR (HCC) 12/23/2019    Right hip pain 11/20/2017    Abnormal nuclear stress test 10/06/2017    Hyperlipidemia LDL goal <100 02/10/2016    Benign essential HTN 12/29/2015    Morbid obesity due to excess calories ([address] 12/29/2015    Pure hypercholesterolemia 12/29/2015    Bilateral knee pain 12/29/2015    Pre-diabetes 12/29/2015       Plan:  1. Abnormal ECG and reduced LVEF on echo:  D/w Dr. Buck Nurse and cath is indicated to assess for significant CAD. Cath in 2017 was negative for significant CAD. RBA discussed with patient and she wishes to proceed. Would start pt on heparin gtt as she could not get cath due to acute systolic HF. 2. Acute on chronic systolic HF:  Would restart lasix 40 mg IVP bid and assess response. 3. NSTEMI from demand ischemia from HF. D/W Dr. Mary Velasquez.     Core Measures:  · Discharge instructions:   · LVEF documented:   · ACEI for LV dysfunction:   · Smoking Cessation:    Hue Cardona MD 12/27/2019 2:47 PM

## 2023-11-15 ENCOUNTER — TELEPHONE (OUTPATIENT)
Dept: ONCOLOGY | Facility: CLINIC | Age: 73
End: 2023-11-15
Payer: MEDICARE

## 2023-11-15 NOTE — TELEPHONE ENCOUNTER
Patient's emergency contact called she wants to know if he can get some labs scheduled for Friday or this coming Monday to check his RBC. Please call.

## 2023-11-15 NOTE — TELEPHONE ENCOUNTER
"Spoke with patient's spouse.  She stated patient is always weak.  She stated he has been SOA (at rest and worse with activity) since chemo started (first cycle) but it has been worse since yesterday.  She stated pt told her the thinks it is due to the steroids he receives during treatment and that it makes him feel \"hyped up\" and that he can't sit still.  She wanted to know if repeat labs can be drawn week after treatment to recheck.  Dr. Ashley notified of the above and per MD, patient's s/o advised that if patient is having worsening SOA, he should go to the nearest emergency room and that if he develops new symptoms, he should be seen sooner than next week, otherwise, Dr. Ashley agrees to see him next week with repeat labs at that time.  She verbalized understanding and stated no new symptoms now and pt would like an appointment next week.  Message sent to scheduling to get patient scheduled.   "

## 2023-11-17 DIAGNOSIS — C34.31 MALIGNANT NEOPLASM OF LOWER LOBE OF RIGHT LUNG: Primary | ICD-10-CM

## 2023-11-21 ENCOUNTER — OFFICE VISIT (OUTPATIENT)
Dept: ONCOLOGY | Facility: CLINIC | Age: 73
End: 2023-11-21
Payer: MEDICARE

## 2023-11-21 ENCOUNTER — HOSPITAL ENCOUNTER (OUTPATIENT)
Dept: ONCOLOGY | Facility: HOSPITAL | Age: 73
Discharge: HOME | End: 2023-11-21
Admitting: INTERNAL MEDICINE
Payer: MEDICARE

## 2023-11-21 VITALS
TEMPERATURE: 97.3 F | HEIGHT: 72 IN | SYSTOLIC BLOOD PRESSURE: 116 MMHG | RESPIRATION RATE: 16 BRPM | WEIGHT: 195 LBS | HEART RATE: 73 BPM | BODY MASS INDEX: 26.41 KG/M2 | OXYGEN SATURATION: 98 % | DIASTOLIC BLOOD PRESSURE: 69 MMHG

## 2023-11-21 DIAGNOSIS — C34.31 MALIGNANT NEOPLASM OF LOWER LOBE OF RIGHT LUNG: Primary | ICD-10-CM

## 2023-11-21 DIAGNOSIS — Z09 CHEMOTHERAPY FOLLOW-UP EXAMINATION: ICD-10-CM

## 2023-11-21 LAB
ALBUMIN SERPL-MCNC: 3.7 G/DL (ref 3.5–5.2)
ALBUMIN/GLOB SERPL: 1.2 G/DL
ALP SERPL-CCNC: 84 U/L (ref 39–117)
ALT SERPL W P-5'-P-CCNC: 13 U/L (ref 1–41)
ANION GAP SERPL CALCULATED.3IONS-SCNC: 8 MMOL/L (ref 5–15)
AST SERPL-CCNC: 20 U/L (ref 1–40)
BASOPHILS # BLD AUTO: 0.02 10*3/MM3 (ref 0–0.2)
BASOPHILS NFR BLD AUTO: 0.7 % (ref 0–1.5)
BILIRUB SERPL-MCNC: 0.2 MG/DL (ref 0–1.2)
BUN SERPL-MCNC: 13 MG/DL (ref 8–23)
BUN/CREAT SERPL: 12.3 (ref 7–25)
CALCIUM SPEC-SCNC: 9 MG/DL (ref 8.6–10.5)
CHLORIDE SERPL-SCNC: 103 MMOL/L (ref 98–107)
CO2 SERPL-SCNC: 27 MMOL/L (ref 22–29)
CREAT SERPL-MCNC: 1.06 MG/DL (ref 0.76–1.27)
DEPRECATED RDW RBC AUTO: 47.9 FL (ref 37–54)
EGFRCR SERPLBLD CKD-EPI 2021: 73.6 ML/MIN/1.73
EOSINOPHIL # BLD AUTO: 0.05 10*3/MM3 (ref 0–0.4)
EOSINOPHIL NFR BLD AUTO: 1.7 % (ref 0.3–6.2)
ERYTHROCYTE [DISTWIDTH] IN BLOOD BY AUTOMATED COUNT: 13.4 % (ref 12.3–15.4)
GLOBULIN UR ELPH-MCNC: 3.1 GM/DL
GLUCOSE SERPL-MCNC: 92 MG/DL (ref 65–99)
HCT VFR BLD AUTO: 36.8 % (ref 37.5–51)
HGB BLD-MCNC: 12.2 G/DL (ref 13–17.7)
IMM GRANULOCYTES # BLD AUTO: 0.01 10*3/MM3 (ref 0–0.05)
IMM GRANULOCYTES NFR BLD AUTO: 0.3 % (ref 0–0.5)
LYMPHOCYTES # BLD AUTO: 0.92 10*3/MM3 (ref 0.7–3.1)
LYMPHOCYTES NFR BLD AUTO: 30.5 % (ref 19.6–45.3)
MCH RBC QN AUTO: 31.9 PG (ref 26.6–33)
MCHC RBC AUTO-ENTMCNC: 33.2 G/DL (ref 31.5–35.7)
MCV RBC AUTO: 96.3 FL (ref 79–97)
MONOCYTES # BLD AUTO: 0.11 10*3/MM3 (ref 0.1–0.9)
MONOCYTES NFR BLD AUTO: 3.6 % (ref 5–12)
NEUTROPHILS NFR BLD AUTO: 1.91 10*3/MM3 (ref 1.7–7)
NEUTROPHILS NFR BLD AUTO: 63.2 % (ref 42.7–76)
PLATELET # BLD AUTO: 84 10*3/MM3 (ref 140–450)
PMV BLD AUTO: 9.4 FL (ref 6–12)
POTASSIUM SERPL-SCNC: 4.5 MMOL/L (ref 3.5–5.2)
PROT SERPL-MCNC: 6.8 G/DL (ref 6–8.5)
RBC # BLD AUTO: 3.82 10*6/MM3 (ref 4.14–5.8)
SODIUM SERPL-SCNC: 138 MMOL/L (ref 136–145)
WBC NRBC COR # BLD AUTO: 3.02 10*3/MM3 (ref 3.4–10.8)

## 2023-11-21 PROCEDURE — 80053 COMPREHEN METABOLIC PANEL: CPT | Performed by: INTERNAL MEDICINE

## 2023-11-21 PROCEDURE — 85025 COMPLETE CBC W/AUTO DIFF WBC: CPT | Performed by: INTERNAL MEDICINE

## 2023-11-21 PROCEDURE — 36591 DRAW BLOOD OFF VENOUS DEVICE: CPT

## 2023-11-21 PROCEDURE — 25010000002 HEPARIN LOCK FLUSH PER 10 UNITS: Performed by: INTERNAL MEDICINE

## 2023-11-21 RX ORDER — SODIUM CHLORIDE 0.9 % (FLUSH) 0.9 %
10 SYRINGE (ML) INJECTION AS NEEDED
OUTPATIENT
Start: 2023-11-21

## 2023-11-21 RX ORDER — OMEPRAZOLE 40 MG/1
40 CAPSULE, DELAYED RELEASE ORAL DAILY
Qty: 30 CAPSULE | Refills: 5 | Status: SHIPPED | OUTPATIENT
Start: 2023-11-21

## 2023-11-21 RX ORDER — HEPARIN SODIUM (PORCINE) LOCK FLUSH IV SOLN 100 UNIT/ML 100 UNIT/ML
500 SOLUTION INTRAVENOUS AS NEEDED
Status: DISCONTINUED | OUTPATIENT
Start: 2023-11-21 | End: 2023-11-22 | Stop reason: HOSPADM

## 2023-11-21 RX ORDER — HEPARIN SODIUM (PORCINE) LOCK FLUSH IV SOLN 100 UNIT/ML 100 UNIT/ML
500 SOLUTION INTRAVENOUS AS NEEDED
OUTPATIENT
Start: 2023-11-21

## 2023-11-21 RX ORDER — SODIUM CHLORIDE 0.9 % (FLUSH) 0.9 %
10 SYRINGE (ML) INJECTION AS NEEDED
Status: DISCONTINUED | OUTPATIENT
Start: 2023-11-21 | End: 2023-11-22 | Stop reason: HOSPADM

## 2023-11-21 RX ADMIN — Medication 10 ML: at 10:11

## 2023-11-21 RX ADMIN — HEPARIN 500 UNITS: 100 SYRINGE at 10:11

## 2023-11-21 NOTE — PROGRESS NOTES
Hematology and Oncology [place]  Office number [phone]    Fax number [phone]     Follow up     Date: 23    Patient Name: Davdi Barfield  MRN: [id number]  : 1950    Referring Physician: Dr. Sampson    Chief Complaint: Nonsmall cell lung cancer follow-up on treatment    Cancer Staging:  Cancer Staging   Stage IIIA (cT2b, cN2, cM0)    History of Present Illness: David Barfield is a pleasant 73 y.o. male who presents today for evaluation of NSCLC. He has a 50 pack year smoking history.    He has a history of a PET avid subpleural RLL lung nod[facility] [place] [facility]. This had an SUV of 9.8 on PET  in association with increased mediastinal LN uptake with SUV around 3. Imaging was felt secondary to osteophyte fibrosis. He did undergo bronchoscopy 2020 with negative cytology. The RLL lung nodule was not felt amenable to bronchoscopy biopsy.    CT lung screen 2023 showed:      PET CT showed mass in the RLL intensely SUV avid, max 17. Mediastinal LN not hypermetabolic above baseline.     Right lower lobe robotic transbronchial biopsy and cytology on 9/15/2023 showed benign findings. Cultures including AFT and fungal were negative.  Station 11L, Station 4L LN negative  Station 7 LN showed NSCLCa (positive for cytokeratin 7, p63, and TTF-1 with no significant staining for p40 or Napsin. The staining pattern raises the possibility of mixed adenocarcinoma and squamous cell carcinoma differentiation in this tumor). PDL1 negative.    MRI brain was negative.    He is here for an opinion regarding management of newly diagnosed lung cancer.    He has a history of sick sinus syndrome s/p PPM and moderate COPD. He describes only mild physical limitations from this. For example he recently walked 1.5 miles at [employer] [facility]. At home, he climbs 17 steps without issue. He does sit down with long activities.     Treatment history:  Carbo, taxol, nivo, C1  10/24/23; C2 11/15/23    Interval history:   He is here for follow-up.  He reports feeling okay but his significant other notes that he has been complaining of feeling poorly at home and has been weak.  He endorses pain in epigastrium, cramping. Nauseated. No emesis. Took zofran and tums, tums does give partial relief of acid reflux but he has not noticed whether this is improving epigastric discomfort..   [facility], he declines this, but SO does notice him stop breathing at night.  Constipation, now resolved. No diarrhea. Did not medicate.   No fever, no worsening cough.   No neuropathy  No fever of infectious symptoms.   SOA with activity faster.   Congested, was taking allegra in AM and benadryl at night but stopped because it wasn't helping. Had RLS and drowsiness with benadryl in infusion. Was hyper after steroid  Not using albuterol.  Has some questions regarding infectious prevention during chemotherapy.  Seeing his surgeon 1/9    Past Medical History:   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    Diabetes mellitus Borderline    Emphysema/COPD     Erectile disorder     GERD (gastroesophageal reflux disease)     Hyperlipidemia     Hypertension 2019    Lung nodule     Mumps     Mumps     Pruritus     after bath    Slow to wake up after anesthesia     Wears dentures     upper only    Wears hearing aid in both ears     usually only wears right   No personal history of myocardial infarction, cerebrovascular event, or venous thromboembolism.  No autoimmune diseases.   No prior cscope, mailed cologuard recently awaiting results.      Past Surgical History:   Past Surgical History:   Procedure Laterality Date    BONE BIOPSY      broken bone surgery in his face    BRONCHOSCOPY WITH ION ROBOTIC ASSIST N/A 9/15/2023    Procedure: BRONCHOSCOPY NAVIGATION WITH ENDOBRONCHIAL ULTRASOUND  AND ION ROBOT;  Surgeon: Octaviano Sampson MD;  Location:  [facility];  Service: Robotics - Pulmonary;  Laterality: N/A;  ion #6 - [id number]  - 0015  Cath guide 0061    EBUS balloon removed and intact    CARDIAC ELECTROPHYSIOLOGY PROCEDURE N/A 08/17/2021    Procedure: Pacemaker DC new;  Surgeon: Kayy Box MD;  Location:  [facility];  Service: Cardiology;  Laterality: N/A;    FACIAL FRACTURE SURGERY      INSERT / REPLACE / REMOVE PACEMAKER  August 17, 2021       Family History:   Family History   Problem Relation Age of Onset    Aneurysm Mother         brain    Dementia Father     Leukemia Sister     Hypertension Paternal Grandfather     Heart disease Paternal Grandmother    Sister leukemia at age 21  No other malignancy    Social History:   Social History     Socioeconomic History    Marital status:     Number of children: 3   Tobacco Use    Smoking status: Every Day     Packs/day: 1.00     Years: 53.00     Additional pack years: 0.00     Total pack years: 53.00     Types: Cigarettes     Start date: 1/1/1968    Smokeless tobacco: Never    Tobacco comments:     Still smoke about 1 ppd   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[place] with [employer] exposure  Rebuilds cars, 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    Medications:     Current Outpatient Medications:     albuterol sulfate  (90 Base) MCG/ACT inhaler, Inhale 2 puffs Every 4 (Four) Hours As Needed for Wheezing., Disp: 18 g, Rfl: 11    fluticasone (VERAMYST) 27.5 MCG/SPRAY nasal spray, 2 sprays into the nostril(s) as directed by provider 1 (One) Time As Needed for Rhinitis or Allergies., Disp: 6 mL, Rfl: 2    Fluticasone-Umeclidin-Vilant (Trelegy Ellipta) 100-62.5-25 MCG/ACT inhaler, Inhale 1 puff Daily., Disp: 3 each, Rfl: 3     "lidocaine-prilocaine (EMLA) 2.5-2.5 % cream, Apply 1 application  topically to the appropriate area as directed As Needed (45-60 minutes prior to port access.  Cover with saran/plastic wrap.)., Disp: 30 g, Rfl: 3    lisinopril (PRINIVIL,ZESTRIL) 2.5 MG tablet, Take 1 tablet by mouth As Needed (elevated blood pressure). Take 1/2 tablet po prn (Patient taking differently: Take 1 tablet by mouth As Needed (elevated blood pressure systolic over 140). Take 1/2 tablet po prn), Disp: 30 tablet, Rfl: 1    nystatin susp + lidocaine viscous (MAGIC MOUTHWASH) oral suspension, 5-10 ml swish and spit or swallow QID prn, Disp: 240 mL, Rfl: 3    OLANZapine (zyPREXA) 5 MG tablet, Take 1 tablet by mouth Every Night. Take nightly x 4 starting night of chemotherapy. (Patient not taking: Reported on 10/23/2023), Disp: 4 tablet, Rfl: 2    ondansetron (ZOFRAN) 8 MG tablet, Take 1 tablet by mouth 3 (Three) Times a Day As Needed for Nausea or Vomiting., Disp: 30 tablet, Rfl: 2    pravastatin (PRAVACHOL) 80 MG tablet, Take 1 tablet by mouth Every Night., Disp: 30 tablet, Rfl: 11    sildenafil (VIAGRA) 100 MG tablet, Take 0.5 tablets by mouth Daily As Needed for Erectile Dysfunction., Disp: , Rfl:   No current facility-administered medications for this visit.    Facility-Administered Medications Ordered in Other Visits:     heparin injection 500 Units, 500 Units, Intravenous, PRN, Neetu Ashley MD, 500 Units at 11/21/23 1011    sodium chloride 0.9 % flush 10 mL, 10 mL, Intravenous, PRN, Neetu Ashley MD, 10 mL at 11/21/23 1011    Allergies:   Allergies   Allergen Reactions    Latex Other (See Comments)     Latex allergy     Tape Rash       Objective     Vital Signs:   Vitals:    11/21/23 1029   BP: 116/69   Pulse: 73   Resp: 16   Temp: 97.3 °F (36.3 °C)   TempSrc: Infrared   SpO2: 98%   Weight: 88.5 kg (195 lb)   Height: 182.9 cm (72.01\")   PainSc: 0-No pain    Body mass index is 26.44 kg/m².   Pain Score    11/21/23 1029   PainSc: " 0-No pain       ECOG Performance Status: 1 - Symptomatic but completely ambulatory    Physical Exam:   General: No acute distress. Well appearing   HEENT: Normocephalic, atraumatic. Sclera anicteric.   Neck: supple, no adenopathy.   Cardiovascular: regular rate and rhythm. No murmurs.   Respiratory: Wheezing, good air movement.  Abdomen: Soft, nontender, non distended with normoactive bowel sounds  Lymph: no cervical, supraclavicular or axillary adenopathy  Neuro: Alert and oriented x 3. No focal deficits.   Ext: Symmetric, no swelling.   Skin: Port site clean dry and intact with no surrounding erythema    Laboratory/Imaging Reviewed:   Hospital Outpatient Visit on 11/21/2023   Component Date Value Ref Range Status    Glucose 11/21/2023 92  65 - 99 mg/dL Final    BUN 11/21/2023 13  8 - 23 mg/dL Final    Creatinine 11/21/2023 1.06  0.76 - 1.27 mg/dL Final    Sodium 11/21/2023 138  136 - 145 mmol/L Final    Potassium 11/21/2023 4.5  3.5 - 5.2 mmol/L Final    Chloride 11/21/2023 103  98 - 107 mmol/L Final    CO2 11/21/2023 27.0  22.0 - 29.0 mmol/L Final    Calcium 11/21/2023 9.0  8.6 - 10.5 mg/dL Final    Total Protein 11/21/2023 6.8  6.0 - 8.5 g/dL Final    Albumin 11/21/2023 3.7  3.5 - 5.2 g/dL Final    ALT (SGPT) 11/21/2023 13  1 - 41 U/L Final    AST (SGOT) 11/21/2023 20  1 - 40 U/L Final    Alkaline Phosphatase 11/21/2023 84  39 - 117 U/L Final    Total Bilirubin 11/21/2023 0.2  0.0 - 1.2 mg/dL Final    Globulin 11/21/2023 3.1  gm/dL Final    Calculated Result    A/G Ratio 11/21/2023 1.2  g/dL Final    BUN/Creatinine Ratio 11/21/2023 12.3  7.0 - 25.0 Final    Anion Gap 11/21/2023 8.0  5.0 - 15.0 mmol/L Final    eGFR 11/21/2023 73.6  >60.0 mL/min/1.73 Final    WBC 11/21/2023 3.02 (L)  3.40 - 10.80 10*3/mm3 Final    RBC 11/21/2023 3.82 (L)  4.14 - 5.80 10*6/mm3 Final    Hemoglobin 11/21/2023 12.2 (L)  13.0 - 17.7 g/dL Final    Hematocrit 11/21/2023 36.8 (L)  37.5 - 51.0 % Final    MCV 11/21/2023 96.3  79.0 - 97.0 fL  Final    MCH 11/21/2023 31.9  26.6 - 33.0 pg Final    MCHC 11/21/2023 33.2  31.5 - 35.7 g/dL Final    RDW 11/21/2023 13.4  12.3 - 15.4 % Final    RDW-SD 11/21/2023 47.9  37.0 - 54.0 fl Final    MPV 11/21/2023 9.4  6.0 - 12.0 fL Final    Platelets 11/21/2023 84 (L)  140 - 450 10*3/mm3 Final    Neutrophil % 11/21/2023 63.2  42.7 - 76.0 % Final    Lymphocyte % 11/21/2023 30.5  19.6 - 45.3 % Final    Monocyte % 11/21/2023 3.6 (L)  5.0 - 12.0 % Final    Eosinophil % 11/21/2023 1.7  0.3 - 6.2 % Final    Basophil % 11/21/2023 0.7  0.0 - 1.5 % Final    Immature Grans % 11/21/2023 0.3  0.0 - 0.5 % Final    Neutrophils, Absolute 11/21/2023 1.91  1.70 - 7.00 10*3/mm3 Final    Lymphocytes, Absolute 11/21/2023 0.92  0.70 - 3.10 10*3/mm3 Final    Monocytes, Absolute 11/21/2023 0.11  0.10 - 0.90 10*3/mm3 Final    Eosinophils, Absolute 11/21/2023 0.05  0.00 - 0.40 10*3/mm3 Final    Basophils, Absolute 11/21/2023 0.02  0.00 - 0.20 10*3/mm3 Final    Immature Grans, Absolute 11/21/2023 0.01  0.00 - 0.05 10*3/mm3 Final   Hospital Outpatient Visit on 11/13/2023   Component Date Value Ref Range Status    Glucose 11/13/2023 107 (H)  65 - 99 mg/dL Final    BUN 11/13/2023 7 (L)  8 - 23 mg/dL Final    Creatinine 11/13/2023 0.83  0.76 - 1.27 mg/dL Final    Sodium 11/13/2023 137  136 - 145 mmol/L Final    Potassium 11/13/2023 4.3  3.5 - 5.2 mmol/L Final    Chloride 11/13/2023 102  98 - 107 mmol/L Final    CO2 11/13/2023 26.0  22.0 - 29.0 mmol/L Final    Calcium 11/13/2023 9.2  8.6 - 10.5 mg/dL Final    Total Protein 11/13/2023 7.1  6.0 - 8.5 g/dL Final    Albumin 11/13/2023 3.8  3.5 - 5.2 g/dL Final    ALT (SGPT) 11/13/2023 10  1 - 41 U/L Final    AST (SGOT) 11/13/2023 13  1 - 40 U/L Final    Alkaline Phosphatase 11/13/2023 97  39 - 117 U/L Final    Total Bilirubin 11/13/2023 0.2  0.0 - 1.2 mg/dL Final    Globulin 11/13/2023 3.3  gm/dL Final    Calculated Result    A/G Ratio 11/13/2023 1.2  g/dL Final    BUN/Creatinine Ratio 11/13/2023 8.4   7.0 - 25.0 Final    Anion Gap 11/13/2023 9.0  5.0 - 15.0 mmol/L Final    eGFR 11/13/2023 91.8  >60.0 mL/min/1.73 Final    TSH 11/13/2023 2.070  0.270 - 4.200 uIU/mL Final    Free T4 11/13/2023 1.00  0.93 - 1.70 ng/dL Final    Uric Acid 11/13/2023 4.2  3.4 - 7.0 mg/dL Final    Falsely depressed results may occur on samples drawn from patients receiving N-Acetylcysteine (NAC) or Metamizole.    WBC 11/13/2023 7.54  3.40 - 10.80 10*3/mm3 Final    RBC 11/13/2023 4.04 (L)  4.14 - 5.80 10*6/mm3 Final    Hemoglobin 11/13/2023 12.9 (L)  13.0 - 17.7 g/dL Final    Hematocrit 11/13/2023 38.4  37.5 - 51.0 % Final    MCV 11/13/2023 95.0  79.0 - 97.0 fL Final    MCH 11/13/2023 31.9  26.6 - 33.0 pg Final    MCHC 11/13/2023 33.6  31.5 - 35.7 g/dL Final    RDW 11/13/2023 13.7  12.3 - 15.4 % Final    RDW-SD 11/13/2023 48.2  37.0 - 54.0 fl Final    MPV 11/13/2023 8.0  6.0 - 12.0 fL Final    Platelets 11/13/2023 260  140 - 450 10*3/mm3 Final    Neutrophil % 11/13/2023 51.5  42.7 - 76.0 % Final    Lymphocyte % 11/13/2023 29.4  19.6 - 45.3 % Final    Monocyte % 11/13/2023 17.9 (H)  5.0 - 12.0 % Final    Eosinophil % 11/13/2023 0.4  0.3 - 6.2 % Final    Basophil % 11/13/2023 0.4  0.0 - 1.5 % Final    Immature Grans % 11/13/2023 0.4  0.0 - 0.5 % Final    Neutrophils, Absolute 11/13/2023 3.88  1.70 - 7.00 10*3/mm3 Final    Lymphocytes, Absolute 11/13/2023 2.22  0.70 - 3.10 10*3/mm3 Final    Monocytes, Absolute 11/13/2023 1.35 (H)  0.10 - 0.90 10*3/mm3 Final    Eosinophils, Absolute 11/13/2023 0.03  0.00 - 0.40 10*3/mm3 Final    Basophils, Absolute 11/13/2023 0.03  0.00 - 0.20 10*3/mm3 Final    Immature Grans, Absolute 11/13/2023 0.03  0.00 - 0.05 10*3/mm3 Final       Complete PFT - Pre & Post Bronchodilator    Result Date: 11/3/2023  Narrative: Full pulmonary function testing was done on 10/23/2023.  Please see scanned PFT report for complete details. Interpretation: Mild obstruction without significant response to bronchodilator.  No  restriction.  No air trapping or hyperinflation.  DLCO not completed.  Compared to prior study on 8/29/2023, FEV1 is increased by 300 mL.     XR Foot 3+ View Left    Result Date: 10/31/2023  Narrative: XR FOOT 3+ VW LEFT Date of Exam: 10/31/2023 9:24 AM EDT Indication: erythema/swelling at base of great toe Comparison: None available. Findings: 3 views. There are no lytic lesions or erosions. Joint compartments are maintained and normally aligned. There are no fractures. There is mild inferior calcaneal spurring.     Impression: Impression: No acute process. Electronically Signed: Elsa Schmid MD  10/31/2023 3:30 PM EDT  Workstation ID: [id number]     Procedures    Bronch 6/2020  Findings:       An EBUS bronchoscope was advanced through the endotracheal tube under        general anesthesia. A comprehensive EBUS survey of all available lymph        node stations revealed lymphadenopathy with benign sonographic features        in stations 11L (9 mm), 4L (8 mm), 7 (15 mm), 4R (11 mm), and 11R (10        mm). EBUS-TBNA x 4 passes (at least) was done at each of those stations        in that sequence with the Olympus ViziShot 21G and 22G needles.       We then withdrew the EBUS scope and inserted a therapeutic scope into        the airway. A thorough airway exam to the first subsegmental level was        unremarkable without endobronchial lesions or secretions. A BAL was        obtained from the right lower lobe superior segment (RB6) (90 ml        instilled, 18 ml of cloudy fluid returned). Adequate hemostasis was        confirmed prior to withdrawing the scope. Patient tolerated procedure.  Impression:           Abnormal CT scan of chest                        1. Successful endobronchial ultrasound bronchoscopy                         with fine needle aspiration.                        2. Mediastinal/hilar lymphadenopathy with benign                         sonographic features in stations 11L, 4L, 7, 4R and 11R.                         3. EBUS-TBNA samples from stations 11L, 4L, 7, 4R and                         11R.                        4. Normal airway anatomy without active bleeding,                         endobronchial lesions or secretions.                        5. BAL from RLL     11R Lymph Node, (EBUS) Fine Needle Aspiration:  - Negative for malignancy  - Abundant lymphoid tissue and histiocytes with anthracotic pigments.     4R Lymph Node, (EBUS) Fine Needle Aspiration:  - Negative for malignancy.  - Abundant lymphoid tissue present.     Subcarinal Lymph Node, (EBUS) Fine Needle Aspiration:  - Negative for malignancy  - Abundant lymphoid tissue present.     4L Lymph Node, (EBUS) Fine Needle Aspiration:  - Negative for malignancy  - Abundant lymphoid tissue.     11L Lymph Node, (EBUS) Fine Needle Aspiration:  - Negative for malignancy.  - Abundant lymphoid tissue and histiocytes with anthracotic pigments.     Bronchoalveolar Lavage, RLL (ThinPrep only):  NO MALIGNANT CELLS IDENTIFIED  Benign respiratory epithelial cells and pulmonary macrophages       Microbiology Results     Date and Time Order Name Sensitivity Status Organisms Specimen ID Source     6/29/2020 1030 Fungus Culture and Smear   Preliminary   [id number] Lung     6/29/2020 1030 BAL/Brush Culture plus Stain, Semiquant.   Final   [id number] Lung     6/29/2020 1030 Acid Fast Culture Plus Stain   Preliminary   [id number] Lung     Assessment / Plan      Assessment/Plan:     Nonsmall cell lung cancer of the RLL, Stage IIIA  Chemo immunotherapy monitoring  -He has an enlarging RLL nodule with SUV of 17. Despite negative transbronchial biopsy, he was found to have N2 disease with a positive level 7 LN. We discussed options for definitive treatment to include induction chemo immunotherapy followed by surgical resection, chemoradiation followed by surgical resection, or definitive chemoradiation. We discussed differences in schedules and side effects. He has no  contraindications to immunotherapy and I recommended nivolumab + chemotherapy induction.  His case was reviewed at multidisciplinary tumor board including thoracic surgery.  He was felt to be a good candidate for neoadjuvant chemo immunotherapy followed by resection assessment.  -I recommended nivolumab, carboplatin, paclitaxel x3 cycles followed by repeat imaging.   -Tempus reviewed and shows TP53 mutatation  -I reviewed his PFTs and discussed with thoracic surgery.  His lung function is adequate for neoadjuvant chemoimmunotherapy approach.  Tentatively plan posttreatment PET/CT followed by surgical resection after completion of 3 cycles of chemoimmunotherapy.  -He is status post cycle 2 chemotherapy.  His labs including CBC and CMP are adequate today.  Discussed escalating supportive care to manage his side effects.  We will lower Benadryl dose for restless legs and sedation on chemotherapy today.    3.  GERD  -Scheduled PPI order placed.    4.  Chemotherapy-induced nausea  -Schedule zofran     5.  COPD  -I encouraged her to schedule albuterol for the next few days and then utilize as needed.    Follow Up:   In 2 weeks with next cycle     Neetu Ashley MD  Hematology and Oncology     I have spent a total of 30 min on the day of service including time before, during, and after the office visit reviewing test results/preparing to see patient, counseling patient, performing medically appropriate exam and documenting clinical information in the electronic or other health record

## 2023-11-22 ENCOUNTER — TELEPHONE (OUTPATIENT)
Dept: ONCOLOGY | Facility: CLINIC | Age: 73
End: 2023-11-22
Payer: MEDICARE

## 2023-11-22 ENCOUNTER — TELEPHONE (OUTPATIENT)
Dept: INTERNAL MEDICINE | Facility: CLINIC | Age: 73
End: 2023-11-22

## 2023-11-22 NOTE — TELEPHONE ENCOUNTER
Balaji called on behalf of pt. She states he has been experiencing stomach pains for 3 days and he is having black stools. His skin is also getting redder every day.    Please call her back to discuss at .

## 2023-11-22 NOTE — TELEPHONE ENCOUNTER
Patient was scheduled for a same day visit after previously be advised from oncology to head to the ER. I spoke with David and Balaji after speaking with Gely Patterson and we also advised him to head to the ER. That way if a scan is needed he would be able to get that completed today. They are worried about flu/covid at the ER due to patient being diagnosed with cancer. I advised them to ask once they get to the ER if they have a separate isolation room they can wait in or if they are able to wait in the car and be called once they are ready for him. They both verbalized understanding and will head to the ER.

## 2023-11-22 NOTE — TELEPHONE ENCOUNTER
Contacted Balaji.  She stated pt has had abdominal pains and black stools for the past few days.  Pt is more red on his skin today. She stated patient is weaker today and she wanted Neualsta administered in office today.  She also stated patient is extremely SOA and it is worsening each day.  Advised Balaji that if SOA is worsening, Dr. Ashley would advise him to go to the ER for evaluation but she stated she wanted this nurse to ask Dr. Ashley anyway.  Dr. Ashley notified of the above and Balaji contacted back and advised per MD that Dr. Ashley stated no to Neulasta injection and that patient is not severely anemic and that she advises that he go to the nearest ER for evaluation with imaging and possible GI bleed. Balaji became frustrated when appointment was not made for today and disconnected the call.

## 2023-11-28 DIAGNOSIS — C34.31 MALIGNANT NEOPLASM OF LOWER LOBE OF RIGHT LUNG: Primary | ICD-10-CM

## 2023-12-04 ENCOUNTER — HOSPITAL ENCOUNTER (OUTPATIENT)
Dept: ONCOLOGY | Facility: HOSPITAL | Age: 73
Discharge: HOME | End: 2023-12-04
Admitting: INTERNAL MEDICINE
Payer: MEDICARE

## 2023-12-04 VITALS
DIASTOLIC BLOOD PRESSURE: 82 MMHG | HEIGHT: 72 IN | RESPIRATION RATE: 18 BRPM | SYSTOLIC BLOOD PRESSURE: 136 MMHG | HEART RATE: 79 BPM | TEMPERATURE: 98.5 F | WEIGHT: 193 LBS | BODY MASS INDEX: 26.14 KG/M2

## 2023-12-04 DIAGNOSIS — C34.31 MALIGNANT NEOPLASM OF LOWER LOBE OF RIGHT LUNG: Primary | ICD-10-CM

## 2023-12-04 LAB
ALBUMIN SERPL-MCNC: 3.9 G/DL (ref 3.5–5.2)
ALBUMIN/GLOB SERPL: 1.1 G/DL
ALP SERPL-CCNC: 99 U/L (ref 39–117)
ALT SERPL W P-5'-P-CCNC: 11 U/L (ref 1–41)
ANION GAP SERPL CALCULATED.3IONS-SCNC: 9 MMOL/L (ref 5–15)
AST SERPL-CCNC: 12 U/L (ref 1–40)
BASOPHILS # BLD AUTO: 0.01 10*3/MM3 (ref 0–0.2)
BASOPHILS NFR BLD AUTO: 0.1 % (ref 0–1.5)
BILIRUB SERPL-MCNC: 0.2 MG/DL (ref 0–1.2)
BUN SERPL-MCNC: 7 MG/DL (ref 8–23)
BUN/CREAT SERPL: 7.9 (ref 7–25)
CALCIUM SPEC-SCNC: 9.5 MG/DL (ref 8.6–10.5)
CHLORIDE SERPL-SCNC: 103 MMOL/L (ref 98–107)
CO2 SERPL-SCNC: 27 MMOL/L (ref 22–29)
CREAT SERPL-MCNC: 0.89 MG/DL (ref 0.76–1.27)
DEPRECATED RDW RBC AUTO: 50.8 FL (ref 37–54)
EGFRCR SERPLBLD CKD-EPI 2021: 89.9 ML/MIN/1.73
EOSINOPHIL # BLD AUTO: 0.02 10*3/MM3 (ref 0–0.4)
EOSINOPHIL NFR BLD AUTO: 0.3 % (ref 0.3–6.2)
ERYTHROCYTE [DISTWIDTH] IN BLOOD BY AUTOMATED COUNT: 14.4 % (ref 12.3–15.4)
GLOBULIN UR ELPH-MCNC: 3.7 GM/DL
GLUCOSE SERPL-MCNC: 94 MG/DL (ref 65–99)
HCT VFR BLD AUTO: 38.1 % (ref 37.5–51)
HGB BLD-MCNC: 12.7 G/DL (ref 13–17.7)
IMM GRANULOCYTES # BLD AUTO: 0.02 10*3/MM3 (ref 0–0.05)
IMM GRANULOCYTES NFR BLD AUTO: 0.3 % (ref 0–0.5)
LYMPHOCYTES # BLD AUTO: 2.08 10*3/MM3 (ref 0.7–3.1)
LYMPHOCYTES NFR BLD AUTO: 30.5 % (ref 19.6–45.3)
MCH RBC QN AUTO: 32 PG (ref 26.6–33)
MCHC RBC AUTO-ENTMCNC: 33.3 G/DL (ref 31.5–35.7)
MCV RBC AUTO: 96 FL (ref 79–97)
MONOCYTES # BLD AUTO: 1.43 10*3/MM3 (ref 0.1–0.9)
MONOCYTES NFR BLD AUTO: 21 % (ref 5–12)
NEUTROPHILS NFR BLD AUTO: 3.26 10*3/MM3 (ref 1.7–7)
NEUTROPHILS NFR BLD AUTO: 47.8 % (ref 42.7–76)
PLATELET # BLD AUTO: 238 10*3/MM3 (ref 140–450)
PMV BLD AUTO: 8 FL (ref 6–12)
POTASSIUM SERPL-SCNC: 4.4 MMOL/L (ref 3.5–5.2)
PROT SERPL-MCNC: 7.6 G/DL (ref 6–8.5)
RBC # BLD AUTO: 3.97 10*6/MM3 (ref 4.14–5.8)
SODIUM SERPL-SCNC: 139 MMOL/L (ref 136–145)
T4 FREE SERPL-MCNC: 1.09 NG/DL (ref 0.93–1.7)
TSH SERPL DL<=0.05 MIU/L-ACNC: 2.55 UIU/ML (ref 0.27–4.2)
WBC NRBC COR # BLD AUTO: 6.82 10*3/MM3 (ref 3.4–10.8)

## 2023-12-04 PROCEDURE — 84439 ASSAY OF FREE THYROXINE: CPT | Performed by: INTERNAL MEDICINE

## 2023-12-04 PROCEDURE — 82728 ASSAY OF FERRITIN: CPT | Performed by: INTERNAL MEDICINE

## 2023-12-04 PROCEDURE — 84443 ASSAY THYROID STIM HORMONE: CPT | Performed by: INTERNAL MEDICINE

## 2023-12-04 PROCEDURE — 36591 DRAW BLOOD OFF VENOUS DEVICE: CPT

## 2023-12-04 PROCEDURE — 85025 COMPLETE CBC W/AUTO DIFF WBC: CPT | Performed by: INTERNAL MEDICINE

## 2023-12-04 PROCEDURE — 83540 ASSAY OF IRON: CPT | Performed by: INTERNAL MEDICINE

## 2023-12-04 PROCEDURE — 80053 COMPREHEN METABOLIC PANEL: CPT | Performed by: INTERNAL MEDICINE

## 2023-12-04 PROCEDURE — 25010000002 HEPARIN LOCK FLUSH PER 10 UNITS: Performed by: INTERNAL MEDICINE

## 2023-12-04 PROCEDURE — 84466 ASSAY OF TRANSFERRIN: CPT | Performed by: INTERNAL MEDICINE

## 2023-12-04 RX ORDER — HEPARIN SODIUM (PORCINE) LOCK FLUSH IV SOLN 100 UNIT/ML 100 UNIT/ML
500 SOLUTION INTRAVENOUS AS NEEDED
Status: CANCELLED | OUTPATIENT
Start: 2023-12-04

## 2023-12-04 RX ORDER — HEPARIN SODIUM (PORCINE) LOCK FLUSH IV SOLN 100 UNIT/ML 100 UNIT/ML
500 SOLUTION INTRAVENOUS AS NEEDED
Status: DISCONTINUED | OUTPATIENT
Start: 2023-12-04 | End: 2023-12-05 | Stop reason: HOSPADM

## 2023-12-04 RX ORDER — SODIUM CHLORIDE 0.9 % (FLUSH) 0.9 %
10 SYRINGE (ML) INJECTION AS NEEDED
Status: CANCELLED | OUTPATIENT
Start: 2023-12-04

## 2023-12-04 RX ADMIN — HEPARIN 500 UNITS: 100 SYRINGE at 10:22

## 2023-12-05 ENCOUNTER — HOSPITAL ENCOUNTER (OUTPATIENT)
Dept: ONCOLOGY | Facility: HOSPITAL | Age: 73
Discharge: HOME | End: 2023-12-05
Admitting: INTERNAL MEDICINE
Payer: MEDICARE

## 2023-12-05 ENCOUNTER — OFFICE VISIT (OUTPATIENT)
Dept: ONCOLOGY | Facility: CLINIC | Age: 73
End: 2023-12-05
Payer: MEDICARE

## 2023-12-05 VITALS
HEART RATE: 74 BPM | BODY MASS INDEX: 26.55 KG/M2 | HEIGHT: 72 IN | TEMPERATURE: 97.1 F | DIASTOLIC BLOOD PRESSURE: 79 MMHG | RESPIRATION RATE: 18 BRPM | OXYGEN SATURATION: 98 % | WEIGHT: 196 LBS | SYSTOLIC BLOOD PRESSURE: 128 MMHG

## 2023-12-05 VITALS — HEART RATE: 81 BPM | DIASTOLIC BLOOD PRESSURE: 86 MMHG | SYSTOLIC BLOOD PRESSURE: 137 MMHG

## 2023-12-05 DIAGNOSIS — C34.31 MALIGNANT NEOPLASM OF LOWER LOBE OF RIGHT LUNG: Primary | ICD-10-CM

## 2023-12-05 LAB
FERRITIN SERPL-MCNC: 224.8 NG/ML (ref 30–400)
IRON 24H UR-MRATE: 50 MCG/DL (ref 59–158)
IRON SATN MFR SERPL: 15 % (ref 20–50)
TIBC SERPL-MCNC: 338 MCG/DL (ref 298–536)
TRANSFERRIN SERPL-MCNC: 227 MG/DL (ref 200–360)

## 2023-12-05 PROCEDURE — 25810000003 SODIUM CHLORIDE 0.9 % SOLUTION: Performed by: INTERNAL MEDICINE

## 2023-12-05 PROCEDURE — 96365 THER/PROPH/DIAG IV INF INIT: CPT

## 2023-12-05 PROCEDURE — 25010000002 HEPARIN LOCK FLUSH PER 10 UNITS: Performed by: INTERNAL MEDICINE

## 2023-12-05 PROCEDURE — 25010000002 DEXAMETHASONE SODIUM PHOSPHATE 100 MG/10ML SOLUTION: Performed by: INTERNAL MEDICINE

## 2023-12-05 PROCEDURE — 96413 CHEMO IV INFUSION 1 HR: CPT

## 2023-12-05 PROCEDURE — 96417 CHEMO IV INFUS EACH ADDL SEQ: CPT

## 2023-12-05 PROCEDURE — 96375 TX/PRO/DX INJ NEW DRUG ADDON: CPT

## 2023-12-05 PROCEDURE — 96415 CHEMO IV INFUSION ADDL HR: CPT

## 2023-12-05 PROCEDURE — 25010000002 NIVOLUMAB 240 MG/24ML SOLUTION 24 ML VIAL: Performed by: INTERNAL MEDICINE

## 2023-12-05 PROCEDURE — 25810000003 SODIUM CHLORIDE 0.9 % SOLUTION 500 ML FLEX CONT: Performed by: INTERNAL MEDICINE

## 2023-12-05 PROCEDURE — 25010000002 NIVOLUMAB 40 MG/4ML SOLUTION 4 ML VIAL: Performed by: INTERNAL MEDICINE

## 2023-12-05 PROCEDURE — 25010000002 DIPHENHYDRAMINE PER 50 MG: Performed by: INTERNAL MEDICINE

## 2023-12-05 PROCEDURE — 25010000002 PACLITAXEL PER 1 MG: Performed by: INTERNAL MEDICINE

## 2023-12-05 PROCEDURE — 25010000002 CARBOPLATIN PER 50 MG: Performed by: INTERNAL MEDICINE

## 2023-12-05 PROCEDURE — 25810000003 SODIUM CHLORIDE 0.9 % SOLUTION 250 ML FLEX CONT: Performed by: INTERNAL MEDICINE

## 2023-12-05 PROCEDURE — 25010000002 FOSAPREPITANT PER 1 MG: Performed by: INTERNAL MEDICINE

## 2023-12-05 PROCEDURE — 96374 THER/PROPH/DIAG INJ IV PUSH: CPT

## 2023-12-05 PROCEDURE — 96367 TX/PROPH/DG ADDL SEQ IV INF: CPT

## 2023-12-05 PROCEDURE — 25010000002 PALONOSETRON PER 25 MCG: Performed by: INTERNAL MEDICINE

## 2023-12-05 RX ORDER — PALONOSETRON 0.05 MG/ML
0.25 INJECTION, SOLUTION INTRAVENOUS ONCE
Status: CANCELLED | OUTPATIENT
Start: 2023-12-05

## 2023-12-05 RX ORDER — FAMOTIDINE 10 MG/ML
20 INJECTION, SOLUTION INTRAVENOUS ONCE
Status: CANCELLED | OUTPATIENT
Start: 2023-12-05

## 2023-12-05 RX ORDER — FAMOTIDINE 10 MG/ML
20 INJECTION, SOLUTION INTRAVENOUS AS NEEDED
Status: CANCELLED | OUTPATIENT
Start: 2023-12-05

## 2023-12-05 RX ORDER — DIPHENHYDRAMINE HYDROCHLORIDE 50 MG/ML
50 INJECTION INTRAMUSCULAR; INTRAVENOUS AS NEEDED
Status: CANCELLED | OUTPATIENT
Start: 2023-12-05

## 2023-12-05 RX ORDER — FAMOTIDINE 10 MG/ML
20 INJECTION, SOLUTION INTRAVENOUS AS NEEDED
Status: COMPLETED | OUTPATIENT
Start: 2023-12-05 | End: 2023-12-05

## 2023-12-05 RX ORDER — SODIUM CHLORIDE 9 MG/ML
20 INJECTION, SOLUTION INTRAVENOUS ONCE
Status: CANCELLED | OUTPATIENT
Start: 2023-12-05

## 2023-12-05 RX ORDER — PALONOSETRON 0.05 MG/ML
0.25 INJECTION, SOLUTION INTRAVENOUS ONCE
Status: COMPLETED | OUTPATIENT
Start: 2023-12-05 | End: 2023-12-05

## 2023-12-05 RX ORDER — HEPARIN SODIUM (PORCINE) LOCK FLUSH IV SOLN 100 UNIT/ML 100 UNIT/ML
500 SOLUTION INTRAVENOUS AS NEEDED
Status: DISCONTINUED | OUTPATIENT
Start: 2023-12-05 | End: 2023-12-06 | Stop reason: HOSPADM

## 2023-12-05 RX ORDER — FAMOTIDINE 10 MG/ML
20 INJECTION, SOLUTION INTRAVENOUS ONCE
Status: DISCONTINUED | OUTPATIENT
Start: 2023-12-05 | End: 2023-12-06 | Stop reason: HOSPADM

## 2023-12-05 RX ORDER — HEPARIN SODIUM (PORCINE) LOCK FLUSH IV SOLN 100 UNIT/ML 100 UNIT/ML
500 SOLUTION INTRAVENOUS AS NEEDED
OUTPATIENT
Start: 2023-12-05

## 2023-12-05 RX ORDER — SODIUM CHLORIDE 9 MG/ML
20 INJECTION, SOLUTION INTRAVENOUS ONCE
Status: COMPLETED | OUTPATIENT
Start: 2023-12-05 | End: 2023-12-05

## 2023-12-05 RX ORDER — SODIUM CHLORIDE 0.9 % (FLUSH) 0.9 %
10 SYRINGE (ML) INJECTION AS NEEDED
OUTPATIENT
Start: 2023-12-05

## 2023-12-05 RX ORDER — DIPHENHYDRAMINE HYDROCHLORIDE 50 MG/ML
50 INJECTION INTRAMUSCULAR; INTRAVENOUS AS NEEDED
Status: DISCONTINUED | OUTPATIENT
Start: 2023-12-05 | End: 2023-12-06 | Stop reason: HOSPADM

## 2023-12-05 RX ADMIN — DEXAMETHASONE SODIUM PHOSPHATE 12 MG: 10 INJECTION, SOLUTION INTRAMUSCULAR; INTRAVENOUS at 11:46

## 2023-12-05 RX ADMIN — HEPARIN 500 UNITS: 100 SYRINGE at 16:43

## 2023-12-05 RX ADMIN — SODIUM CHLORIDE 360 MG: 9 INJECTION, SOLUTION INTRAVENOUS at 11:05

## 2023-12-05 RX ADMIN — SODIUM CHLORIDE 20 ML/HR: 9 INJECTION, SOLUTION INTRAVENOUS at 11:41

## 2023-12-05 RX ADMIN — CARBOPLATIN 580 MG: 10 INJECTION, SOLUTION INTRAVENOUS at 16:03

## 2023-12-05 RX ADMIN — SODIUM CHLORIDE 365 MG: 9 INJECTION, SOLUTION INTRAVENOUS at 12:31

## 2023-12-05 RX ADMIN — FOSAPREPITANT DIMEGLUMINE 150 MG: 150 INJECTION, POWDER, LYOPHILIZED, FOR SOLUTION INTRAVENOUS at 11:44

## 2023-12-05 RX ADMIN — DIPHENHYDRAMINE HYDROCHLORIDE 25 MG: 50 INJECTION INTRAMUSCULAR; INTRAVENOUS at 11:54

## 2023-12-05 RX ADMIN — PALONOSETRON HYDROCHLORIDE 0.25 MG: 0.25 INJECTION INTRAVENOUS at 11:42

## 2023-12-05 RX ADMIN — FAMOTIDINE 20 MG: 10 INJECTION, SOLUTION INTRAVENOUS at 11:41

## 2023-12-05 NOTE — LETTER
December 10, 2023       No Recipients    Patient: David Barfield   YOB: 1950   Date of Visit: 2023     Dear Joey Patel MD:     Please see the enclosed update on our mutual patient who received his final cycle of chemo immunotherapy on 23. He has a post treatment PET pending later this month. Please do not hesitate to contact me by phone with any questions.         Sincerely,        Neetu Ashley MD        CC:   No Recipients    Neetu Ashley MD  12/10/23 0639  Sign when Signing Visit    [facility] [place]  Office number [phone]    Fax number [phone]     Follow up     Date: 23    Patient Name: David Barfield  MRN: [id number]  : 1950    Referring Physician: Dr. Sampson    Chief Complaint: Nonsmall cell lung cancer follow-up on treatment    Cancer Staging:  Cancer Staging   Stage IIIA (cT2b, cN2, cM0)    History of Present Illness: David Barfield is a pleasant 73 y.o. male who presents today for evaluation of NSCLC. He has a 50 pack year smoking history.    He has a history of a PET avid subpleural RLL lung nodule [facility] [place] [facility]. This had an SUV of 9.8 on PET  in association with increased mediastinal LN uptake with SUV around 3. Imaging was felt secondary to osteophyte fibrosis. He did undergo bronchoscopy 2020 with negative cytology. The RLL lung nodule was not felt amenable to bronchoscopy biopsy.    CT lung screen 2023 showed:      PET CT showed mass in the RLL intensely SUV avid, max 17. Mediastinal LN not hypermetabolic above baseline.     Right lower lobe robotic transbronchial biopsy and cytology on 9/15/2023 showed benign findings. Cultures including AFT and fungal were negative.  Station 11L, Station 4L LN negative  Station 7 LN showed NSCLCa (positive for cytokeratin 7, p63, and TTF-1 with no significant staining for p40 or Napsin. The staining pattern raises the possibility of  mixed adenocarcinoma and squamous cell carcinoma differentiation in this tumor). PDL1 negative.    MRI brain was negative.    He is here for an opinion regarding management of newly diagnosed lung cancer.    He has a history of sick sinus syndrome s/p PPM and moderate COPD. He describes only mild physical limitations from this. For example he recently walked 1.5 miles at [facility]. At home, he climbs 17 steps without issue. He does sit down with long activities.     Treatment history:  Carbo, taxol, nivo, C1 10/24/23; C2 11/15/23    Interval history:   He is here for consideration of cycle 3 neoadjuvant chemo/immunotherapy in the company of his supportive significant other.   On the day prior to Thanksgiving holiday weekend, his significant other called with complaints of weakness and black stools. They were instructed to go to the ED by my office and PCP office but ultimately declined to go. He notes his SO had started him on by mouth iron after reviewing his labs showing chemotherapy induced anemia, but her recollection is that the black stools started first. Currently endorses a daily solid BM, black. He is currently taking by mouth iron. At the time he called in, he was also having severe stomach pain associated with SOA. He feels generally more winded following activities which has slowly progressed during chemo. He does remain active, but tolerance is less. Raked leaves  on Friday, and stopped early, which he states is because it was too windy, but SO notes he does minimize symptoms and felt he stopped because he was winded. Was able to walk trash cans down drive and back, yesterday. Yesterday when walking in from parking garage to lab was weak when walking back to car, had to stop and rest once he got to car. No chest pain, dyspnea, sleeps upright at baseline. SO says he gurgles from phlegm at night overnight long standing.   [facility] assessment  including echocardiogram.  Planning to see Dr. Patel 1/8 and possible OR 1/9. He is worried about whether he will be ready for OR that quickly given his cumulative fatigue.    Past Medical History:   Past Medical History:   Diagnosis Date   • Abnormal ECG    • Arrhythmia 2019   • Asthma 2019    Emphysema, COPD   • Bronchogenic cancer of right lung 10/04/2023   • Diabetes mellitus Borderline   • Emphysema/COPD    • Erectile disorder    • GERD (gastroesophageal reflux disease)    • Hyperlipidemia    • Hypertension 2019   • Lung nodule    • Mumps    • Mumps    • Pruritus     after bath   • Slow to wake up after anesthesia    • Wears dentures     upper only   • Wears hearing aid in both ears     usually only wears right   No personal history of myocardial infarction, cerebrovascular event, or venous thromboembolism.  No autoimmune diseases.   No prior cscope, mailed cologuard recently    Past Surgical History:   Past Surgical History:   Procedure Laterality Date   • BONE BIOPSY      broken bone surgery in his face   • BRONCHOSCOPY WITH ION ROBOTIC ASSIST N/A 9/15/2023    Procedure: BRONCHOSCOPY NAVIGATION WITH ENDOBRONCHIAL ULTRASOUND AND ION ROBOT;  Surgeon: Octaviano Sampson MD;  Location:  [facility];  Service: Robotics - Pulmonary;  Laterality: N/A;  ion #6 - 0032  - 0015  Cath guide 0061    EBUS balloon removed and intact   • CARDIAC ELECTROPHYSIOLOGY PROCEDURE N/A 08/17/2021    Procedure: Pacemaker DC new;  Surgeon: Kayy Box MD;  Location:  [facility];  Service: Cardiology;  Laterality: N/A;   • FACIAL FRACTURE SURGERY     • INSERT / REPLACE / REMOVE PACEMAKER  August 17, 2021       Family History:   Family History   Problem Relation Age of Onset   • Aneurysm Mother         brain   • Dementia Father    • Leukemia Sister    • Hypertension Paternal Grandfather    • Heart disease Paternal Grandmother    Sister leukemia at age 21  No other malignancy    Social History:    Social History     Socioeconomic History   • Marital status:    • Number of children: 3   Tobacco Use   • Smoking status: Every Day     Packs/day: 1.00     Years: 53.00     Additional pack years: 0.00     Total pack years: 53.00     Types: Cigarettes     Start date: 1/1/1968   • Smokeless tobacco: Never   • Tobacco comments:     Still smoke about 1 ppd   Vaping Use   • Vaping Use: Never used   Substance and Sexual Activity   • Alcohol use: Never   • Drug use: Never   • Sexual activity: Defer     Partners: Female     Birth control/protection: None   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, [place]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  Rebuilds cars, 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    Medications:     Current Outpatient Medications:   •  albuterol sulfate  (90 Base) MCG/ACT inhaler, Inhale 2 puffs Every 4 (Four) Hours As Needed for Wheezing., Disp: 18 g, Rfl: 11  •  fluticasone (VERAMYST) 27.5 MCG/SPRAY nasal spray, 2 sprays into the nostril(s) as directed by provider 1 (One) Time As Needed for Rhinitis or Allergies., Disp: 6 mL, Rfl: 2  •  Fluticasone-Umeclidin-Vilant (Trelegy Ellipta) 100-62.5-25 MCG/ACT inhaler, Inhale 1 puff Daily., Disp: 3 each, Rfl: 3  •  lidocaine-prilocaine (EMLA) 2.5-2.5 % cream, Apply 1 application  topically to the appropriate area as directed As Needed (45-60 minutes prior to port access.  Cover with saran/plastic wrap.)., Disp: 30 g, Rfl: 3  •  lisinopril (PRINIVIL,ZESTRIL) 2.5 MG tablet, Take 1 tablet by mouth As Needed (elevated blood pressure). Take 1/2 tablet po prn (Patient taking differently: Take 1 tablet by mouth As Needed (elevated blood pressure systolic over 140). Take 1/2 tablet po prn), Disp: 30 tablet, Rfl: 1  •  nystatin susp + lidocaine viscous (MAGIC MOUTHWASH) oral suspension, 5-10 ml swish and spit or swallow QID prn, Disp: 240 mL, Rfl: 3  •  OLANZapine (zyPREXA) 5 MG tablet, Take 1 tablet by mouth Every Night. Take nightly x 4 starting night of chemotherapy. (Patient not taking:  "Reported on 10/23/2023), Disp: 4 tablet, Rfl: 2  •  omeprazole (priLOSEC) 40 MG capsule, Take 1 capsule by mouth Daily., Disp: 30 capsule, Rfl: 5  •  ondansetron (ZOFRAN) 8 MG tablet, Take 1 tablet by mouth 3 (Three) Times a Day As Needed for Nausea or Vomiting., Disp: 30 tablet, Rfl: 2  •  pravastatin (PRAVACHOL) 80 MG tablet, Take 1 tablet by mouth Every Night., Disp: 30 tablet, Rfl: 11  •  sildenafil (VIAGRA) 100 MG tablet, Take 0.5 tablets by mouth Daily As Needed for Erectile Dysfunction., Disp: , Rfl:   No current facility-administered medications for this visit.    Allergies:   Allergies   Allergen Reactions   • Latex Other (See Comments)     Latex allergy    • Tape Rash       Objective     Vital Signs:   Vitals:    12/05/23 0938   BP: 128/79   Pulse: 74   Resp: 18   Temp: 97.1 °F (36.2 °C)   TempSrc: Infrared   SpO2: 98%   Weight: 88.9 kg (196 lb)   Height: 182.9 cm (72.01\")   PainSc: 0-No pain    Body mass index is 26.58 kg/m².   Pain Score    12/05/23 0938   PainSc: 0-No pain       ECOG Performance Status: 1 - Symptomatic but completely ambulatory    Physical Exam:   General: No acute distress. Well appearing   HEENT: Normocephalic, atraumatic. Sclera anicteric.   Neck: supple, no adenopathy.   Cardiovascular: regular rate and rhythm. No murmurs.   Respiratory: Wheezing, good air movement.  Abdomen: Soft, nontender, non distended with normoactive bowel sounds  Lymph: no cervical, supraclavicular or axillary adenopathy  Neuro: Alert and oriented x 3. No focal deficits.   Ext: Symmetric, no swelling.   Skin: Port site clean dry and intact with no surrounding erythema    Laboratory/Imaging Reviewed:   Hospital Outpatient Visit on 12/04/2023   Component Date Value Ref Range Status   • Glucose 12/04/2023 94  65 - 99 mg/dL Final   • BUN 12/04/2023 7 (L)  8 - 23 mg/dL Final   • Creatinine 12/04/2023 0.89  0.76 - 1.27 mg/dL Final   • Sodium 12/04/2023 139  136 - 145 mmol/L Final   • Potassium 12/04/2023 4.4  3.5 - " 5.2 mmol/L Final   • Chloride 12/04/2023 103  98 - 107 mmol/L Final   • CO2 12/04/2023 27.0  22.0 - 29.0 mmol/L Final   • Calcium 12/04/2023 9.5  8.6 - 10.5 mg/dL Final   • Total Protein 12/04/2023 7.6  6.0 - 8.5 g/dL Final   • Albumin 12/04/2023 3.9  3.5 - 5.2 g/dL Final   • ALT (SGPT) 12/04/2023 11  1 - 41 U/L Final   • AST (SGOT) 12/04/2023 12  1 - 40 U/L Final   • Alkaline Phosphatase 12/04/2023 99  39 - 117 U/L Final   • Total Bilirubin 12/04/2023 0.2  0.0 - 1.2 mg/dL Final   • Globulin 12/04/2023 3.7  gm/dL Final    Calculated Result   • A/G Ratio 12/04/2023 1.1  g/dL Final   • BUN/Creatinine Ratio 12/04/2023 7.9  7.0 - 25.0 Final   • Anion Gap 12/04/2023 9.0  5.0 - 15.0 mmol/L Final   • eGFR 12/04/2023 89.9  >60.0 mL/min/1.73 Final   • TSH 12/04/2023 2.550  0.270 - 4.200 uIU/mL Final   • Free T4 12/04/2023 1.09  0.93 - 1.70 ng/dL Final   • WBC 12/04/2023 6.82  3.40 - 10.80 10*3/mm3 Final   • RBC 12/04/2023 3.97 (L)  4.14 - 5.80 10*6/mm3 Final   • Hemoglobin 12/04/2023 12.7 (L)  13.0 - 17.7 g/dL Final   • Hematocrit 12/04/2023 38.1  37.5 - 51.0 % Final   • MCV 12/04/2023 96.0  79.0 - 97.0 fL Final   • MCH 12/04/2023 32.0  26.6 - 33.0 pg Final   • MCHC 12/04/2023 33.3  31.5 - 35.7 g/dL Final   • RDW 12/04/2023 14.4  12.3 - 15.4 % Final   • RDW-SD 12/04/2023 50.8  37.0 - 54.0 fl Final   • MPV 12/04/2023 8.0  6.0 - 12.0 fL Final   • Platelets 12/04/2023 238  140 - 450 10*3/mm3 Final   • Neutrophil % 12/04/2023 47.8  42.7 - 76.0 % Final   • Lymphocyte % 12/04/2023 30.5  19.6 - 45.3 % Final   • Monocyte % 12/04/2023 21.0 (H)  5.0 - 12.0 % Final   • Eosinophil % 12/04/2023 0.3  0.3 - 6.2 % Final   • Basophil % 12/04/2023 0.1  0.0 - 1.5 % Final   • Immature Grans % 12/04/2023 0.3  0.0 - 0.5 % Final   • Neutrophils, Absolute 12/04/2023 3.26  1.70 - 7.00 10*3/mm3 Final   • Lymphocytes, Absolute 12/04/2023 2.08  0.70 - 3.10 10*3/mm3 Final   • Monocytes, Absolute 12/04/2023 1.43 (H)  0.10 - 0.90 10*3/mm3 Final   •  Eosinophils, Absolute 2023 0.02  0.00 - 0.40 10*3/mm3 Final   • Basophils, Absolute 2023 0.01  0.00 - 0.20 10*3/mm3 Final   • Immature Grans, Absolute 2023 0.02  0.00 - 0.05 10*3/mm3 Final       ECHO COMPLETE (DOPPLER / COLOR) W OR WO CONTRAST    Result Date: 2023  Narrative: TRANSTHORACIC ECHOCARDIOGRAPHY REPORT  Demographics  Patient Name:          RODGER ROB  :            1950  Medical Record Number: [id number]          Age:            73 year(s)  Corporate ID Number:   [id number]          Gender          Male  Account Number:        [id number]  Sonographer:           Faiza TREJO Height:         70 inches  Referring Physician:   JOHN NAVAS  Weight:         194.01 pounds  Interpreting           MEDARDO OBRIEN MD        BMI:            27.84 kg/m^2  Physician:  Date of Service:       2023          Blood Pressure: 168/100 mmHg  Room Number:           OP Type of Study:  TTE procedure: ECHO COMPLETE (DOPPLER / COLOR) W OR WO CONTRAST.  Patient Status: Routine OP  Study Location: Echo LabTechnical Quality: Adequate visualization  Impression:  Indication: Other forms of chronic ischemic heart disease I25.89  ########################################  Normal sized left ventricle. Moderate left ventricular hypertrophy.  Visually estimated ejection fraction 55% +/- 5%. Abnormal systolic strain  pattern. Normal left ventricular diastolic function.  No significant valvular heart disease.  ######################################## Measurements Summary:  LVEDd: 4.16 cm         LVESd: 3.03 cm          IVSEd: 1.45 cm  AO Root:3.76 cm        LVPWd: 1.38 cm  Contractility Score  At rest the following contractility abnormalities were noted: Hypokinesis  of the Basal infero-lateral, the Basal jalen-septal, the Basal  infero-septal, the Basal inferior and the Basal jalen-lateral segments.  Contractility of all other segments appeared normal.  LV regional wall motion: (0-Not  visualized 1-Normal 2-Hypokinesis  3-Akinesis 4-Dyskinesis 5-Aneurysm)  Left Ventricle  Peak E-wave: 0.48   Peak A-wave: 0.62 m/s   E/A ratio: 0.78  m/s                 Volume [id number]   LV length: 8.71 cm  ml  Volume npkfkuoo20.24 ml  LVOT diameter: 2.41 cm  Normal sized left ventricle.  Moderate left ventricular hypertrophy.  Visually estimated ejection fraction 55% +/- 5%.  Abnormal systolic strain pattern.  Normal left ventricular diastolic function.  No left ventricular masses or thrombi.  Right Ventricle  Diastolic dimension: 3.05     RV systolic pressure: 21.97 mmHg  cm  Normal sized right ventricle.  Pacing electrode in the right ventricle.  Normal right ventricular function.  Left Atrium  LA dimension: 3.34 cm               LA volume:52.02 ml  LA/Aorta: 0.89  Normal sized left atrium.  Normal left atrial volume index  Intact atrial septum.  No atrial mass or thrombus.  Right Atrium  Normal sized right atrium.  Pacer wire crosses the tricuspid valve.  Intact atrial septum.  No atrial mass or thrombus.  Mitral Valve  Deceleration time: 197.82 msec  Thickened mitral valve leaflets.  Trace mitral regurgitation.  No mitral stenosis.  No masses or vegetations seen.  Aortic Valve  AI P1/2t: 674.17 msec  LVOT VTI: 18.35 cm         Deceleration time: 2324.74 msec  Mildly thickend free edges of the aortic valve leaflets.  Mild aortic valve regurgitation.  No aortic stenosis.  No masses or vegetations seen.  Tricuspid Valve  TR velocity: 2.18 m/s     TR gradient: 18.49435 mmHg  Estimated RAP: 3 mmHg     RVSP: 21.97 mmHg  Structurally normal tricuspid valve.  Trace tricuspid regurgitation.  No tricuspid stenosis.  No masses or vegetations seen.  Pulmonic Valve  Acceleration time: 89.97 msec           PASP: 21.97 mmHg  Structurally normal pulmonic valve.  Abnormal pulmonary acceleration time.  No pulmonic regurgitation.  No pulmonic stenosis.  No masses or vegetations seen. Great Vessels Aorta  Aortic Root: 3.76  cm  Ascending Aorta: 3.71 cm  LVOT Diameter: 2.41 cm Visualized aorta is normal. Mildly dilated aortic root 3.8 cm. No evidence of dissection. Normal IVC with appropriate collapse.  Pericardium / Pleura No pericardial effusion.  ----------------------------------------------------------------  Electronically signed by MEDARDO OBRIEN MD(Interpreting Physician)  on 12/01/2023 16:31  ----------------------------------------------------------------     Procedures    Bronch 6/2020  Findings:       An EBUS bronchoscope was advanced through the endotracheal tube under        general anesthesia. A comprehensive EBUS survey of all available lymph        node stations revealed lymphadenopathy with benign sonographic features        in stations 11L (9 mm), 4L (8 mm), 7 (15 mm), 4R (11 mm), and 11R (10        mm). EBUS-TBNA x 4 passes (at least) was done at each of those stations        in that sequence with the Olympus ViziShot 21G and 22G needles.       We then withdrew the EBUS scope and inserted a therapeutic scope into        the airway. A thorough airway exam to the first subsegmental level was        unremarkable without endobronchial lesions or secretions. A BAL was        obtained from the right lower lobe superior segment (RB6) (90 ml        instilled, 18 ml of cloudy fluid returned). Adequate hemostasis was        confirmed prior to withdrawing the scope. Patient tolerated procedure.  Impression:           Abnormal CT scan of chest                        1. Successful endobronchial ultrasound bronchoscopy                         with fine needle aspiration.                        2. Mediastinal/hilar lymphadenopathy with benign                         sonographic features in stations 11L, 4L, 7, 4R and 11R.                        3. EBUS-TBNA samples from stations 11L, 4L, 7, 4R and                         11R.                        4. Normal airway anatomy without active bleeding,                         endobronchial  lesions or secretions.                        5. BAL from RLL     11R Lymph Node, (EBUS) Fine Needle Aspiration:  - Negative for malignancy  - Abundant lymphoid tissue and histiocytes with anthracotic pigments.     4R Lymph Node, (EBUS) Fine Needle Aspiration:  - Negative for malignancy.  - Abundant lymphoid tissue present.     Subcarinal Lymph Node, (EBUS) Fine Needle Aspiration:  - Negative for malignancy  - Abundant lymphoid tissue present.     4L Lymph Node, (EBUS) Fine Needle Aspiration:  - Negative for malignancy  - Abundant lymphoid tissue.     11L Lymph Node, (EBUS) Fine Needle Aspiration:  - Negative for malignancy.  - Abundant lymphoid tissue and histiocytes with anthracotic pigments.     Bronchoalveolar Lavage, RLL (ThinPrep only):  NO MALIGNANT CELLS IDENTIFIED  Benign respiratory epithelial cells and pulmonary macrophages       Microbiology Results     Date and Time Order Name Sensitivity Status Organisms Specimen ID Source     2020 1030 Fungus Culture and Smear   Preliminary   [id number] Lung     2020 1030 BAL/Brush Culture plus Stain, Semiquant.   Final   [id number] Lung     2020 1030 Acid Fast Culture Plus Stain   Preliminary   [id number] Lung   TRANSTHORACIC ECHOCARDIOGRAPHY REPORT   Demographics   Patient Name:          RODGER ROB  :            1950   Medical Record Number: [id number]          Age:            73 year(s)   Corporate ID Number:   [id number]          Gender          Male   Account Number:        [id number]   Sonographer:           Faiza TREJO Height:         70 inches   Referring Physician:   JOHN NAVAS  Weight:         194.01 pounds   Interpreting           MEDARDO OBRIEN MD        BMI:            27.84 kg/m^2   Physician:   Date of Service:       2023          Blood Pressure: 168/100 mmHg   Room Number:           OP  Type of Study:   TTE procedure: ECHO COMPLETE (DOPPLER / COLOR) W OR WO CONTRAST.   Patient Status: Routine OP   Study  Location: Echo LabTechnical Quality: Adequate visualization   Impression:   Indication: Other forms of chronic ischemic heart disease I25.89   ########################################   Normal sized left ventricle. Moderate left ventricular hypertrophy.   Visually estimated ejection fraction 55% +/- 5%. Abnormal systolic strain   pattern. Normal left ventricular diastolic function.   No significant valvular heart disease.   ########################################  Measurements Summary:   LVEDd: 4.16 cm         LVESd: 3.03 cm          IVSEd: 1.45 cm   AO Root:3.76 cm        LVPWd: 1.38 cm   Contractility Score   At rest the following contractility abnormalities were noted: Hypokinesis   of the Basal infero-lateral, the Basal jalen-septal, the Basal   infero-septal, the Basal inferior and the Basal jalen-lateral segments.   Contractility of all other segments appeared normal.   LV regional wall motion: (0-Not visualized 1-Normal 2-Hypokinesis   3-Akinesis 4-Dyskinesis 5-Aneurysm)   Left Ventricle   Peak E-wave: 0.48   Peak A-wave: 0.62 m/s   E/A ratio: 0.78   m/s                 Volume [id number]   LV length: 8.71 cm   ml   Volume zdovssfb13.24 ml   LVOT diameter: 2.41 cm   Normal sized left ventricle.   Moderate left ventricular hypertrophy.   Visually estimated ejection fraction 55% +/- 5%.   Abnormal systolic strain pattern.   Normal left ventricular diastolic function.   No left ventricular masses or thrombi.   Right Ventricle   Diastolic dimension: 3.05     RV systolic pressure: 21.97 mmHg   cm   Normal sized right ventricle.   Pacing electrode in the right ventricle.   Normal right ventricular function.   Left Atrium   LA dimension: 3.34 cm               LA volume:52.02 ml   LA/Aorta: 0.89   Normal sized left atrium.   Normal left atrial volume index   Intact atrial septum.   No atrial mass or thrombus.   Right Atrium   Normal sized right atrium.   Pacer wire crosses the tricuspid valve.   Intact atrial  septum.   No atrial mass or thrombus.   Mitral Valve   Deceleration time: 197.82 msec   Thickened mitral valve leaflets.   Trace mitral regurgitation.   No mitral stenosis.   No masses or vegetations seen.   Aortic Valve   AI P1/2t: 674.17 msec   LVOT VTI: 18.35 cm         Deceleration time: 2324.74 msec   Mildly thickend free edges of the aortic valve leaflets.   Mild aortic valve regurgitation.   No aortic stenosis.   No masses or vegetations seen.   Tricuspid Valve   TR velocity: 2.18 m/s     TR gradient: 18.82114 mmHg   Estimated RAP: 3 mmHg     RVSP: 21.97 mmHg   Structurally normal tricuspid valve.   Trace tricuspid regurgitation.   No tricuspid stenosis.   No masses or vegetations seen.   Pulmonic Valve   Acceleration time: 89.97 msec           PASP: 21.97 mmHg   Structurally normal pulmonic valve.   Abnormal pulmonary acceleration time.   No pulmonic regurgitation.   No pulmonic stenosis.   No masses or vegetations seen.  Great Vessels  Aorta   Aortic Root: 3.76 cm   Ascending Aorta: 3.71 cm   LVOT Diameter: 2.41 cm  Visualized aorta is normal.  Mildly dilated aortic root 3.8 cm.  No evidence of dissection.  Normal IVC with appropriate collapse.   Pericardium / Pleura  No pericardial effusion.   ----------------------------------------------------------------   Electronically signed by MEDARDO OBRIEN MD(Interpreting Physician)   on 2023 16:31   ----------------------------------------------------------------  Procedure Note    Medardo Obrien MD - 2023  Formatting of this note might be different from the original.  TRANSTHORACIC ECHOCARDIOGRAPHY REPORT   Demographics   Patient Name:          RODGER ROB  :            1950   Medical Record Number: [id number]          Age:            73 year(s)   Corporate ID Number:   [id number]          Gender          Male   Account Number:        [id number]   Sonographer:           Faiza TREJO Height:         70 inches   [facility]    [facility]  Weight:         194.01 pounds   Interpreting           MEDARDO OBRIEN MD        BMI:            27.84 kg/m^2   Physician:   Date of Service:       12/01/2023          Blood Pressure: 168/100 mmHg   Room Number:           OP  Type of Study:   TTE procedure: ECHO COMPLETE (DOPPLER / COLOR) W OR WO CONTRAST.   Patient Status: Routine OP   Study Location: Echo LabTechnical Quality: Adequate visualization   Impression:   Indication: Other forms of chronic ischemic heart disease I25.89   ########################################   Normal sized left ventricle. Moderate left ventricular hypertrophy.   Visually estimated ejection fraction 55% +/- 5%. Abnormal systolic strain   pattern. Normal left ventricular diastolic function.   No significant valvular heart disease.   ########################################  Measurements Summary:   LVEDd: 4.16 cm         LVESd: 3.03 cm          IVSEd: 1.45 cm   AO Root:3.76 cm        LVPWd: 1.38 cm   Contractility Score   At rest the following contractility abnormalities were noted: Hypokinesis   of the Basal infero-lateral, the Basal jalen-septal, the Basal   infero-septal, the Basal inferior and the Basal jalen-lateral segments.   Contractility of all other segments appeared normal.   LV regional wall motion: (0-Not visualized 1-Normal 2-Hypokinesis   3-Akinesis 4-Dyskinesis 5-Aneurysm)   Left Ventricle   Peak E-wave: 0.48   Peak A-wave: 0.62 m/s   E/A ratio: 0.78   m/s                 Volume [id number]   LV length: 8.71 cm   ml   Volume [id number] ml   LVOT diameter: 2.41 cm   Normal sized left ventricle.   Moderate left ventricular hypertrophy.   Visually estimated ejection fraction 55% +/- 5%.   Abnormal systolic strain pattern.   Normal left ventricular diastolic function.   No left ventricular masses or thrombi.   Right Ventricle   Diastolic dimension: 3.05     RV systolic pressure: 21.97 mmHg   cm   Normal sized right ventricle.   Pacing electrode in the  right ventricle.   Normal right ventricular function.   Left Atrium   LA dimension: 3.34 cm               LA volume:52.02 ml   LA/Aorta: 0.89   Normal sized left atrium.   Normal left atrial volume index   Intact atrial septum.   No atrial mass or thrombus.   Right Atrium   Normal sized right atrium.   Pacer wire crosses the tricuspid valve.   Intact atrial septum.   No atrial mass or thrombus.   Mitral Valve   Deceleration time: 197.82 msec   Thickened mitral valve leaflets.   Trace mitral regurgitation.   No mitral stenosis.   No masses or vegetations seen.   Aortic Valve   AI P1/2t: 674.17 msec   LVOT VTI: 18.35 cm         Deceleration time: 2324.74 msec   Mildly thickend free edges of the aortic valve leaflets.   Mild aortic valve regurgitation.   No aortic stenosis.   No masses or vegetations seen.   Tricuspid Valve   TR velocity: 2.18 m/s     TR gradient: 18.52153 mmHg   Estimated RAP: 3 mmHg     RVSP: 21.97 mmHg   Structurally normal tricuspid valve.   Trace tricuspid regurgitation.   No tricuspid stenosis.   No masses or vegetations seen.   Pulmonic Valve   Acceleration time: 89.97 msec           PASP: 21.97 mmHg   Structurally normal pulmonic valve.   Abnormal pulmonary acceleration time.   No pulmonic regurgitation.   No pulmonic stenosis.   No masses or vegetations seen.  Great Vessels  Aorta   Aortic Root: 3.76 cm   Ascending Aorta: 3.71 cm   LVOT Diameter: 2.41 cm  Visualized aorta is normal.  Mildly dilated aortic root 3.8 cm.  No evidence of dissection.  Normal IVC with appropriate collapse.   Pericardium / Pleura  No pericardial effusion.  Assessment / Plan      Assessment/Plan:     Nonsmall cell lung cancer of the RLL, Stage IIIA  Chemo immunotherapy monitoring  -He has an enlarging RLL nodule with SUV of 17. Despite negative transbronchial biopsy, he was found to have N2 disease with a positive level 7 LN. We discussed options for definitive treatment to include induction chemo immunotherapy  followed by surgical resection, chemoradiation followed by surgical resection, or definitive chemoradiation. We discussed differences in schedules and side effects. He has no contraindications to immunotherapy and I recommended nivolumab + chemotherapy induction.  His case was reviewed at multidisciplinary tumor board including thoracic surgery.  He was felt to be a good candidate for neoadjuvant chemo immunotherapy followed by resection assessment.  -I recommended nivolumab, carboplatin, paclitaxel x3 cycles followed by repeat imaging.   -Tempus reviewed and shows TP53 mutatation  -I reviewed his PFTs and discussed with thoracic surgery.  His lung function is adequate for neoadjuvant chemoimmunotherapy approach.  Tentatively plan posttreatment PET/CT followed by surgical resection after completion of 3 cycles of chemoimmunotherapy.  -He is here for consideration of cycle 3. CBC/CMP are adequate. We reviewed necessity of following through with recommendations for evaluation of acute post chemotherapy symptoms and treatment related side effects can be severe, even life threatening, if not evaluated in a timely fashion  -Has follow up PET pending 12/27 and assessment for surgical resection with CTS 1/8/24. I will see him back with repeat labs and PET results 12/28/23.    3.  Epigastric pain  4.  Black stools  5.  Oral iron use  6.  Chemotherapy induced anemia  -Concerning for gastritis/upper GI blood loss, but could be a side effect of oral iron. Stop oral iron. Iron profile and ferritin consistent with adequate iron stores. Fortunately his hemoglobin is 12.7 today which argues against substantial/rapid GI blood loss but does not exclude slow bleed. Check FOBT. I asked him to collect this asap. If positive, needs GI assessment/upper endoscopy.   -Omeprazole 40 mg daily.    4.  Chemotherapy-induced nausea  -Schedule zofran     5.  COPD  -Continue stable regimen.     Follow Up:   FOBT asap  4 weeks Port labs CBC/CMP,  PET results.   Seeing CTS in Jan for resection evaluation.     Neetu Ashley MD  Hematology and Oncology

## 2023-12-05 NOTE — PROGRESS NOTES
Hematology and Oncology Likely  Office number [phone]    Fax number [phone]     Follow up     Date: 23    Patient Name: David Barfield  MRN: [id number]  : 1950    Referring Physician: Dr. Sampson    Chief Complaint: Nonsmall cell lung cancer follow-up on treatment    Cancer Staging:  Cancer Staging   Stage IIIA (cT2b, cN2, cM0)    History of Present Illness: David Barfield is a pleasant 73 y.o. male who presents today for evaluation of NSCLC. He has a 50 pack year smoking history.    He has a history of a PET avid subpleural RLL lung nodule [facility] [place] [facility]. This had an SUV of 9.8 on PET  in association with increased mediastinal LN uptake with SUV around 3. Imaging was felt secondary to osteophyte fibrosis. He did undergo bronchoscopy 2020 with negative cytology. The RLL lung nodule was not felt amenable to bronchoscopy biopsy.    CT lung screen 2023 showed:      PET CT showed mass in the RLL intensely SUV avid, max 17. Mediastinal LN not hypermetabolic above baseline.     Right lower lobe robotic transbronchial biopsy and cytology on 9/15/2023 showed benign findings. Cultures including AFT and fungal were negative.  Station 11L, Station 4L LN negative  Station 7 LN showed NSCLCa (positive for cytokeratin 7, p63, and TTF-1 with no significant staining for p40 or Napsin. The staining pattern raises the possibility of mixed adenocarcinoma and squamous cell carcinoma differentiation in this tumor). PDL1 negative.    MRI brain was negative.    He is here for an opinion regarding management of newly diagnosed lung cancer.    He has a history of sick sinus syndrome s/p PPM and moderate COPD. He describes only mild physical limitations from this. For example he recently walked 1.5 miles at [facility]. At home, he climbs 17 steps without issue. He does sit down with long activities.     Treatment history:  Carbo, taxol, nivo, C1  10/24/23; C2 11/15/23    Interval history:   He is here for consideration of cycle 3 neoadjuvant chemo/immunotherapy in the company of his supportive significant other.   On the day prior to Thanksgiving holiday weekend, his significant other called with complaints of weakness and black stools. They were instructed to go to the ED by my office and PCP office but ultimately declined to go. He notes his SO had started him on by mouth iron after reviewing his labs showing chemotherapy induced anemia, but her recollection is that the black stools started first. Currently endorses a daily solid BM, black. He is currently taking by mouth iron. At the time he called in, he was also having severe stomach pain associated with SOA. He feels generally more winded following activities which has slowly progressed during chemo. He does remain active, but tolerance is less. Raked leaves  on Friday, and stopped early, which he states is because it was too windy, but SO notes he does minimize symptoms and felt he stopped because he was winded. Was able to walk trash cans down drive and back, yesterday. Yesterday when walking in from parking garage to lab was weak when walking back to car, had to stop and rest once he got to car. No chest pain, dyspnea, sleeps upright at baseline. SO says he gurgles from phlegm at night overnight long standing.   [facility] including [facility].  Planning to see Dr. Patel 1/8 and possible OR 1/9. He is worried about whether he will be ready for OR that quickly given his cumulative fatigue.    Past Medical History:   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    Diabetes mellitus Borderline    Emphysema/COPD     Erectile disorder     GERD (gastroesophageal reflux disease)     Hyperlipidemia     Hypertension 2019    Lung  nodule     Mumps     Mumps     Pruritus     after bath    Slow to wake up after anesthesia     Wears dentures     upper only    Wears hearing aid in both ears     usually only wears right   No personal history of myocardial infarction, cerebrovascular event, or venous thromboembolism.  No autoimmune diseases.   No prior cscope, mailed cologuard recently    Past Surgical History:   Past Surgical History:   Procedure Laterality Date    BONE BIOPSY      broken bone surgery in his face    BRONCHOSCOPY WITH ION ROBOTIC ASSIST N/A 9/15/2023    Procedure: BRONCHOSCOPY NAVIGATION WITH ENDOBRONCHIAL ULTRASOUND AND ION ROBOT;  Surgeon: Octaviano Sampson MD;  Location:  [facility];  Service: Robotics - Pulmonary;  Laterality: N/A;  ion #6 - [id number]  - 0015  Cath guide 0061    EBUS balloon removed and intact    CARDIAC ELECTROPHYSIOLOGY PROCEDURE N/A 08/17/2021    Procedure: Pacemaker DC new;  Surgeon: Kayy Box MD;  Location:  [facility];  Service: Cardiology;  Laterality: N/A;    FACIAL FRACTURE SURGERY      INSERT / REPLACE / REMOVE PACEMAKER  August 17, 2021       Family History:   Family History   Problem Relation Age of Onset    Aneurysm Mother         brain    Dementia Father     Leukemia Sister     Hypertension Paternal Grandfather     Heart disease Paternal Grandmother    Sister leukemia at age 21  No other malignancy    Social History:   Social History     Socioeconomic History    Marital status:     Number of children: 3   Tobacco Use    Smoking status: Every Day     Packs/day: 1.00     Years: 53.00     Additional pack years: 0.00     Total pack years: 53.00     Types: Cigarettes     Start date: 1/1/1968    Smokeless tobacco: Never    Tobacco comments:     Still smoke about 1 ppd   Vaping Use    Vaping Use: Never used   Substance and Sexual Activity    Alcohol use: Never    Drug use: Never    Sexual activity: Defer     Partners: Female     Birth control/protection: None    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, [place]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  Rebuilds cars, 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    Medications:     Current Outpatient Medications:     albuterol sulfate  (90 Base) MCG/ACT inhaler, Inhale 2 puffs Every 4 (Four) Hours As Needed for Wheezing., Disp: 18 g, Rfl: 11    fluticasone (VERAMYST) 27.5 MCG/SPRAY nasal spray, 2 sprays into the nostril(s) as directed by provider 1 (One) Time As Needed for Rhinitis or Allergies., Disp: 6 mL, Rfl: 2    Fluticasone-Umeclidin-Vilant (Trelegy Ellipta) 100-62.5-25 MCG/ACT inhaler, Inhale 1 puff Daily., Disp: 3 each, Rfl: 3    lidocaine-prilocaine (EMLA) 2.5-2.5 % cream, Apply 1 application  topically to the appropriate area as directed As Needed (45-60 minutes prior to port access.  Cover with saran/plastic wrap.)., Disp: 30 g, Rfl: 3    lisinopril (PRINIVIL,ZESTRIL) 2.5 MG tablet, Take 1 tablet by mouth As Needed (elevated blood pressure). Take 1/2 tablet po prn (Patient taking differently: Take 1 tablet by mouth As Needed (elevated blood pressure systolic over 140). Take 1/2 tablet po prn), Disp: 30 tablet, Rfl: 1    nystatin susp + lidocaine viscous (MAGIC MOUTHWASH) oral suspension, 5-10 ml swish and spit or swallow QID prn, Disp: 240 mL, Rfl: 3    OLANZapine (zyPREXA) 5 MG tablet, Take 1 tablet by mouth Every Night. Take nightly x 4 starting night of chemotherapy. (Patient not taking: Reported on 10/23/2023), Disp: 4 tablet, Rfl: 2    omeprazole (priLOSEC) 40 MG capsule, Take 1 capsule by mouth Daily., Disp: 30 capsule, Rfl: 5    ondansetron (ZOFRAN) 8 MG tablet, Take 1 tablet by mouth 3 (Three) Times a Day As Needed for Nausea or Vomiting., Disp: 30 tablet, Rfl: 2    pravastatin (PRAVACHOL) 80 MG tablet, Take 1 tablet by mouth Every Night., Disp: 30 tablet, Rfl: 11    sildenafil (VIAGRA) 100 MG tablet, Take 0.5 tablets by mouth Daily As Needed for Erectile Dysfunction., Disp: , Rfl:   No current facility-administered medications for this  "visit.    Allergies:   Allergies   Allergen Reactions    Latex Other (See Comments)     Latex allergy     Tape Rash       Objective     Vital Signs:   Vitals:    12/05/23 0938   BP: 128/79   Pulse: 74   Resp: 18   Temp: 97.1 °F (36.2 °C)   TempSrc: Infrared   SpO2: 98%   Weight: 88.9 kg (196 lb)   Height: 182.9 cm (72.01\")   PainSc: 0-No pain    Body mass index is 26.58 kg/m².   Pain Score    12/05/23 0938   PainSc: 0-No pain       ECOG Performance Status: 1 - Symptomatic but completely ambulatory    Physical Exam:   General: No acute distress. Well appearing   HEENT: Normocephalic, atraumatic. Sclera anicteric.   Neck: supple, no adenopathy.   Cardiovascular: regular rate and rhythm. No murmurs.   Respiratory: Wheezing, good air movement.  Abdomen: Soft, nontender, non distended with normoactive bowel sounds  Lymph: no cervical, supraclavicular or axillary adenopathy  Neuro: Alert and oriented x 3. No focal deficits.   Ext: Symmetric, no swelling.   Skin: Port site clean dry and intact with no surrounding erythema    Laboratory/Imaging Reviewed:   Hospital Outpatient Visit on 12/04/2023   Component Date Value Ref Range Status    Glucose 12/04/2023 94  65 - 99 mg/dL Final    BUN 12/04/2023 7 (L)  8 - 23 mg/dL Final    Creatinine 12/04/2023 0.89  0.76 - 1.27 mg/dL Final    Sodium 12/04/2023 139  136 - 145 mmol/L Final    Potassium 12/04/2023 4.4  3.5 - 5.2 mmol/L Final    Chloride 12/04/2023 103  98 - 107 mmol/L Final    CO2 12/04/2023 27.0  22.0 - 29.0 mmol/L Final    Calcium 12/04/2023 9.5  8.6 - 10.5 mg/dL Final    Total Protein 12/04/2023 7.6  6.0 - 8.5 g/dL Final    Albumin 12/04/2023 3.9  3.5 - 5.2 g/dL Final    ALT (SGPT) 12/04/2023 11  1 - 41 U/L Final    AST (SGOT) 12/04/2023 12  1 - 40 U/L Final    Alkaline Phosphatase 12/04/2023 99  39 - 117 U/L Final    Total Bilirubin 12/04/2023 0.2  0.0 - 1.2 mg/dL Final    Globulin 12/04/2023 3.7  gm/dL Final    Calculated Result    A/G Ratio 12/04/2023 1.1  g/dL Final "    BUN/Creatinine Ratio 2023 7.9  7.0 - 25.0 Final    Anion Gap 2023 9.0  5.0 - 15.0 mmol/L Final    eGFR 2023 89.9  >60.0 mL/min/1.73 Final    TSH 2023 2.550  0.270 - 4.200 uIU/mL Final    Free T4 2023 1.09  0.93 - 1.70 ng/dL Final    WBC 2023 6.82  3.40 - 10.80 10*3/mm3 Final    RBC 2023 3.97 (L)  4.14 - 5.80 10*6/mm3 Final    Hemoglobin 2023 12.7 (L)  13.0 - 17.7 g/dL Final    Hematocrit 2023 38.1  37.5 - 51.0 % Final    MCV 2023 96.0  79.0 - 97.0 fL Final    MCH 2023 32.0  26.6 - 33.0 pg Final    MCHC 2023 33.3  31.5 - 35.7 g/dL Final    RDW 2023 14.4  12.3 - 15.4 % Final    RDW-SD 2023 50.8  37.0 - 54.0 fl Final    MPV 2023 8.0  6.0 - 12.0 fL Final    Platelets 2023 238  140 - 450 10*3/mm3 Final    Neutrophil % 2023 47.8  42.7 - 76.0 % Final    Lymphocyte % 2023 30.5  19.6 - 45.3 % Final    Monocyte % 2023 21.0 (H)  5.0 - 12.0 % Final    Eosinophil % 2023 0.3  0.3 - 6.2 % Final    Basophil % 2023 0.1  0.0 - 1.5 % Final    Immature Grans % 2023 0.3  0.0 - 0.5 % Final    Neutrophils, Absolute 2023 3.26  1.70 - 7.00 10*3/mm3 Final    Lymphocytes, Absolute 2023 2.08  0.70 - 3.10 10*3/mm3 Final    Monocytes, Absolute 2023 1.43 (H)  0.10 - 0.90 10*3/mm3 Final    Eosinophils, Absolute 2023 0.02  0.00 - 0.40 10*3/mm3 Final    Basophils, Absolute 2023 0.01  0.00 - 0.20 10*3/mm3 Final    Immature Grans, Absolute 2023 0.02  0.00 - 0.05 10*3/mm3 Final       ECHO COMPLETE (DOPPLER / COLOR) W OR WO CONTRAST    Result Date: 2023  Narrative: TRANSTHORACIC ECHOCARDIOGRAPHY REPORT  Demographics  Patient Name:          RODGER ROB  :            1950  Medical Record Number: [id number]          Age:            73 year(s)  Corporate ID Number:   [id number]          Gender          Male  Account Number:        [id number]  Sonographer:            Faiza TREJO Height:         70 inches  Referring Physician:   JOHN NAVAS  Weight:         194.01 pounds  Interpreting           MEDARDO OBRIEN MD        BMI:            27.84 kg/m^2  Physician:  Date of Service:       12/01/2023          Blood Pressure: 168/100 mmHg  Room Number:           OP Type of Study:  TTE procedure: ECHO COMPLETE (DOPPLER / COLOR) W OR WO CONTRAST.  Patient Status: Routine OP  Study Location: Echo LabTechnical Quality: Adequate visualization  Impression:  Indication: Other forms of chronic ischemic heart disease I25.89  ########################################  Normal sized left ventricle. Moderate left ventricular hypertrophy.  Visually estimated ejection fraction 55% +/- 5%. Abnormal systolic strain  pattern. Normal left ventricular diastolic function.  No significant valvular heart disease.  ######################################## Measurements Summary:  LVEDd: 4.16 cm         LVESd: 3.03 cm          IVSEd: 1.45 cm  AO Root:3.76 cm        LVPWd: 1.38 cm  Contractility Score  At rest the following contractility abnormalities were noted: Hypokinesis  of the Basal infero-lateral, the Basal jalen-septal, the Basal  infero-septal, the Basal inferior and the Basal jalen-lateral segments.  Contractility of all other segments appeared normal.  LV regional wall motion: (0-Not visualized 1-Normal 2-Hypokinesis  3-Akinesis 4-Dyskinesis 5-Aneurysm)  Left Ventricle  Peak E-wave: 0.48   Peak A-wave: 0.62 m/s   E/A ratio: 0.78  m/s                 Volume [id number]   LV length: 8.71 cm  ml  Volume gxvwmwmx62.24 ml  LVOT diameter: 2.41 cm  Normal sized left ventricle.  Moderate left ventricular hypertrophy.  Visually estimated ejection fraction 55% +/- 5%.  Abnormal systolic strain pattern.  Normal left ventricular diastolic function.  No left ventricular masses or thrombi.  Right Ventricle  Diastolic dimension: 3.05     RV systolic pressure: 21.97 mmHg  cm  Normal sized right ventricle.   Pacing electrode in the right ventricle.  Normal right ventricular function.  Left Atrium  LA dimension: 3.34 cm               LA volume:52.02 ml  LA/Aorta: 0.89  Normal sized left atrium.  Normal left atrial volume index  Intact atrial septum.  No atrial mass or thrombus.  Right Atrium  Normal sized right atrium.  Pacer wire crosses the tricuspid valve.  Intact atrial septum.  No atrial mass or thrombus.  Mitral Valve  Deceleration time: 197.82 msec  Thickened mitral valve leaflets.  Trace mitral regurgitation.  No mitral stenosis.  No masses or vegetations seen.  Aortic Valve  AI P1/2t: 674.17 msec  LVOT VTI: 18.35 cm         Deceleration time: 2324.74 msec  Mildly thickend free edges of the aortic valve leaflets.  Mild aortic valve regurgitation.  No aortic stenosis.  No masses or vegetations seen.  Tricuspid Valve  TR velocity: 2.18 m/s     TR gradient: 18.52569 mmHg  Estimated RAP: 3 mmHg     RVSP: 21.97 mmHg  Structurally normal tricuspid valve.  Trace tricuspid regurgitation.  No tricuspid stenosis.  No masses or vegetations seen.  Pulmonic Valve  Acceleration time: 89.97 msec           PASP: 21.97 mmHg  Structurally normal pulmonic valve.  Abnormal pulmonary acceleration time.  No pulmonic regurgitation.  No pulmonic stenosis.  No masses or vegetations seen. Great Vessels Aorta  Aortic Root: 3.76 cm  Ascending Aorta: 3.71 cm  LVOT Diameter: 2.41 cm Visualized aorta is normal. Mildly dilated aortic root 3.8 cm. No evidence of dissection. Normal IVC with appropriate collapse.  Pericardium / Pleura No pericardial effusion.  ----------------------------------------------------------------  Electronically signed by MEDARDO OBRIEN MD(Interpreting Physician)  on 12/01/2023 16:31  ----------------------------------------------------------------     Procedures    Bronch 6/2020  Findings:       An EBUS bronchoscope was advanced through the endotracheal tube under        general anesthesia. A comprehensive EBUS survey of  all available lymph        node stations revealed lymphadenopathy with benign sonographic features        in stations 11L (9 mm), 4L (8 mm), 7 (15 mm), 4R (11 mm), and 11R (10        mm). EBUS-TBNA x 4 passes (at least) was done at each of those stations        in that sequence with the Olympus ViziShot 21G and 22G needles.       We then withdrew the EBUS scope and inserted a therapeutic scope into        the airway. A thorough airway exam to the first subsegmental level was        unremarkable without endobronchial lesions or secretions. A BAL was        obtained from the right lower lobe superior segment (RB6) (90 ml        instilled, 18 ml of cloudy fluid returned). Adequate hemostasis was        confirmed prior to withdrawing the scope. Patient tolerated procedure.  Impression:           Abnormal CT scan of chest                        1. Successful endobronchial ultrasound bronchoscopy                         with fine needle aspiration.                        2. Mediastinal/hilar lymphadenopathy with benign                         sonographic features in stations 11L, 4L, 7, 4R and 11R.                        3. EBUS-TBNA samples from stations 11L, 4L, 7, 4R and                         11R.                        4. Normal airway anatomy without active bleeding,                         endobronchial lesions or secretions.                        5. BAL from RLL     11R Lymph Node, (EBUS) Fine Needle Aspiration:  - Negative for malignancy  - Abundant lymphoid tissue and histiocytes with anthracotic pigments.     4R Lymph Node, (EBUS) Fine Needle Aspiration:  - Negative for malignancy.  - Abundant lymphoid tissue present.     Subcarinal Lymph Node, (EBUS) Fine Needle Aspiration:  - Negative for malignancy  - Abundant lymphoid tissue present.     4L Lymph Node, (EBUS) Fine Needle Aspiration:  - Negative for malignancy  - Abundant lymphoid tissue.     11L Lymph Node, (EBUS) Fine Needle Aspiration:  - Negative for  malignancy.  - Abundant lymphoid tissue and histiocytes with anthracotic pigments.     Bronchoalveolar Lavage, RLL (ThinPrep only):  NO MALIGNANT CELLS IDENTIFIED  Benign respiratory epithelial cells and pulmonary macrophages       Microbiology Results     Date and Time Order Name Sensitivity Status Organisms Specimen ID Source     2020 1030 Fungus Culture and Smear   Preliminary   [id number] Lung     2020 1030 BAL/Brush Culture plus Stain, [place].   Final   [id number] Lung     2020 1030 Acid Fast Culture Plus Stain   Preliminary   [id number] Lung   TRANSTHORACIC ECHOCARDIOGRAPHY REPORT   Demographics   Patient Name:          RODGER ROB  :            1950   Medical Record Number: [id number]          Age:            73 year(s)   Corporate ID Number:   [id number]          Gender          Male   Account Number:        [id number]   Sonographer:           Faiza TREJO Height:         70 inches   Referring Physician:   JOHN NAVAS  Weight:         194.01 pounds   Interpreting           MEDARDO OBRIEN MD        BMI:            27.84 kg/m^2   Physician:   Date of Service:       2023          Blood Pressure: 168/100 mmHg   Room Number:           OP  Type of Study:   TTE procedure: ECHO COMPLETE (DOPPLER / COLOR) W OR WO CONTRAST.   Patient Status: Routine OP   Study Location: Echo LabTechnical Quality: Adequate visualization   Impression:   Indication: Other forms of chronic ischemic heart disease I25.89   ########################################   Normal sized left ventricle. Moderate left ventricular hypertrophy.   Visually estimated ejection fraction 55% +/- 5%. Abnormal systolic strain   pattern. Normal left ventricular diastolic function.   No significant valvular heart disease.   ########################################  Measurements Summary:   LVEDd: 4.16 cm         LVESd: 3.03 cm          IVSEd: 1.45 cm   AO Root:3.76 cm        LVPWd: 1.38 cm   Contractility Score   At rest  the following contractility abnormalities were noted: Hypokinesis   of the Basal infero-lateral, the Basal jalen-septal, the Basal   infero-septal, the Basal inferior and the Basal jalen-lateral segments.   Contractility of all other segments appeared normal.   LV regional wall motion: (0-Not visualized 1-Normal 2-Hypokinesis   3-Akinesis 4-Dyskinesis 5-Aneurysm)   Left Ventricle   Peak E-wave: 0.48   Peak A-wave: 0.62 m/s   E/A ratio: 0.78   m/s                 Volume [id number]   LV length: 8.71 cm   ml   Volume tlngwioa62.24 ml   LVOT diameter: 2.41 cm   Normal sized left ventricle.   Moderate left ventricular hypertrophy.   Visually estimated ejection fraction 55% +/- 5%.   Abnormal systolic strain pattern.   Normal left ventricular diastolic function.   No left ventricular masses or thrombi.   Right Ventricle   Diastolic dimension: 3.05     RV systolic pressure: 21.97 mmHg   cm   Normal sized right ventricle.   Pacing electrode in the right ventricle.   Normal right ventricular function.   Left Atrium   LA dimension: 3.34 cm               LA volume:52.02 ml   LA/Aorta: 0.89   Normal sized left atrium.   Normal left atrial volume index   Intact atrial septum.   No atrial mass or thrombus.   Right Atrium   Normal sized right atrium.   Pacer wire crosses the tricuspid valve.   Intact atrial septum.   No atrial mass or thrombus.   Mitral Valve   Deceleration time: 197.82 msec   Thickened mitral valve leaflets.   Trace mitral regurgitation.   No mitral stenosis.   No masses or vegetations seen.   Aortic Valve   AI P1/2t: 674.17 msec   LVOT VTI: 18.35 cm         Deceleration time: 2324.74 msec   Mildly thickend free edges of the aortic valve leaflets.   Mild aortic valve regurgitation.   No aortic stenosis.   No masses or vegetations seen.   Tricuspid Valve   TR velocity: 2.18 m/s     TR gradient: 18.09225 mmHg   Estimated RAP: 3 mmHg     RVSP: 21.97 mmHg   Structurally normal tricuspid valve.   Trace tricuspid  regurgitation.   No tricuspid stenosis.   No masses or vegetations seen.   Pulmonic Valve   Acceleration time: 89.97 msec           PASP: 21.97 mmHg   Structurally normal pulmonic valve.   Abnormal pulmonary acceleration time.   No pulmonic regurgitation.   No pulmonic stenosis.   No masses or vegetations seen.  Great Vessels  Aorta   Aortic Root: 3.76 cm   Ascending Aorta: 3.71 cm   LVOT Diameter: 2.41 cm  Visualized aorta is normal.  Mildly dilated aortic root 3.8 cm.  No evidence of dissection.  Normal IVC with appropriate collapse.   Pericardium / Pleura  No pericardial effusion.   ----------------------------------------------------------------   Electronically signed by MEDARDO OBRIEN MD(Interpreting Physician)   on 2023 16:31   ----------------------------------------------------------------  Procedure Note    Medardo Obrien MD - 2023  Formatting of this note might be different from the original.  TRANSTHORACIC ECHOCARDIOGRAPHY REPORT   Demographics   Patient Name:          RODGER ROB  :            1950   Medical Record Number: [id number]          Age:            73 year(s)   Corporate ID Number:   [id number]          Gender          Male   Account Number:        [id number]   Sonographer:           Faiza TREJO Height:         70 inches   Referring Physician:   JOHN NAVAS  Weight:         194.01 pounds   Interpreting           MEDARDO OBRIEN MD        BMI:            27.84 kg/m^2   Physician:   Date of Service:       2023          Blood Pressure: 168/100 mmHg   Room Number:           OP  Type of Study:   TTE procedure: ECHO COMPLETE (DOPPLER / COLOR) W OR WO CONTRAST.   Patient Status: Routine OP   Study Location: Echo LabTechnical Quality: Adequate visualization   Impression:   Indication: Other forms of chronic ischemic heart disease I25.89   ########################################   Normal sized left ventricle. Moderate left ventricular hypertrophy.   Visually  estimated ejection fraction 55% +/- 5%. Abnormal systolic strain   pattern. Normal left ventricular diastolic function.   No significant valvular heart disease.   ########################################  Measurements Summary:   LVEDd: 4.16 cm         LVESd: 3.03 cm          IVSEd: 1.45 cm   AO Root:3.76 cm        LVPWd: 1.38 cm   Contractility Score   At rest the following contractility abnormalities were noted: Hypokinesis   of the Basal infero-lateral, the Basal jalen-septal, the Basal   infero-septal, the Basal inferior and the Basal jalen-lateral segments.   Contractility of all other segments appeared normal.   LV regional wall motion: (0-Not visualized 1-Normal 2-Hypokinesis   3-Akinesis 4-Dyskinesis 5-Aneurysm)   Left Ventricle   Peak E-wave: 0.48   Peak A-wave: 0.62 m/s   E/A ratio: 0.78   m/s                 Volume kxpzbrhgr34.99   LV length: 8.71 cm   ml   Volume wljshfcb27.24 ml   LVOT diameter: 2.41 cm   Normal sized left ventricle.   Moderate left ventricular hypertrophy.   Visually estimated ejection fraction 55% +/- 5%.   Abnormal systolic strain pattern.   Normal left ventricular diastolic function.   No left ventricular masses or thrombi.   Right Ventricle   Diastolic dimension: 3.05     RV systolic pressure: 21.97 mmHg   cm   Normal sized right ventricle.   Pacing electrode in the right ventricle.   Normal right ventricular function.   Left Atrium   LA dimension: 3.34 cm               LA volume:52.02 ml   LA/Aorta: 0.89   Normal sized left atrium.   Normal left atrial volume index   Intact atrial septum.   No atrial mass or thrombus.   Right Atrium   Normal sized right atrium.   Pacer wire crosses the tricuspid valve.   Intact atrial septum.   No atrial mass or thrombus.   Mitral Valve   Deceleration time: 197.82 msec   Thickened mitral valve leaflets.   Trace mitral regurgitation.   No mitral stenosis.   No masses or vegetations seen.   Aortic Valve   AI P1/2t: 674.17 msec   LVOT VTI: 18.35 cm          Deceleration time: 2324.74 msec   Mildly thickend free edges of the aortic valve leaflets.   Mild aortic valve regurgitation.   No aortic stenosis.   No masses or vegetations seen.   Tricuspid Valve   TR velocity: 2.18 m/s     TR gradient: 18.11022 mmHg   Estimated RAP: 3 mmHg     RVSP: 21.97 mmHg   Structurally normal tricuspid valve.   Trace tricuspid regurgitation.   No tricuspid stenosis.   No masses or vegetations seen.   Pulmonic Valve   Acceleration time: 89.97 msec           PASP: 21.97 mmHg   Structurally normal pulmonic valve.   Abnormal pulmonary acceleration time.   No pulmonic regurgitation.   No pulmonic stenosis.   No masses or vegetations seen.  Great Vessels  Aorta   Aortic Root: 3.76 cm   Ascending Aorta: 3.71 cm   LVOT Diameter: 2.41 cm  Visualized aorta is normal.  Mildly dilated aortic root 3.8 cm.  No evidence of dissection.  Normal IVC with appropriate collapse.   Pericardium / Pleura  No pericardial effusion.  Assessment / Plan      Assessment/Plan:     Nonsmall cell lung cancer of the RLL, Stage IIIA  Chemo immunotherapy monitoring  -He has an enlarging RLL nodule with SUV of 17. Despite negative transbronchial biopsy, he was found to have N2 disease with a positive level 7 LN. We discussed options for definitive treatment to include induction chemo immunotherapy followed by surgical resection, chemoradiation followed by surgical resection, or definitive chemoradiation. We discussed differences in schedules and side effects. He has no contraindications to immunotherapy and I recommended nivolumab + chemotherapy induction.  His case was reviewed at multidisciplinary tumor board including thoracic surgery.  He was felt to be a good candidate for neoadjuvant chemo immunotherapy followed by resection assessment.  -I recommended nivolumab, carboplatin, paclitaxel x3 cycles followed by repeat imaging.   -Tempus reviewed and shows TP53 mutatation  -I reviewed his PFTs and discussed with  thoracic surgery.  His lung function is adequate for neoadjuvant chemoimmunotherapy approach.  Tentatively plan posttreatment PET/CT followed by surgical resection after completion of 3 cycles of chemoimmunotherapy.  -He is here for consideration of cycle 3. CBC/CMP are adequate. We reviewed necessity of following through with recommendations for evaluation of acute post chemotherapy symptoms and treatment related side effects can be severe, even life threatening, if not evaluated in a timely fashion  -Has follow up PET pending 12/27 and assessment for surgical resection with CTS 1/8/24. I will see him back with repeat labs and PET results 12/28/23.    3.  Epigastric pain  4.  Black stools  5.  Oral iron use  6.  Chemotherapy induced anemia  -Concerning for gastritis/upper GI blood loss, but could be a side effect of oral iron. Stop oral iron. Iron profile and ferritin consistent with adequate iron stores. Fortunately his hemoglobin is 12.7 today which argues against substantial/rapid GI blood loss but does not exclude slow bleed. Check FOBT. I asked him to collect this asap. If positive, needs GI assessment/upper endoscopy.   -Omeprazole 40 mg daily.    4.  Chemotherapy-induced nausea  -Schedule zofran     5.  COPD  -Continue stable regimen.     Follow Up:   FOBT asap  4 weeks Port labs CBC/CMP, PET results.   Seeing CTS in Jan for resection evaluation.     Neetu Ashley MD  Hematology and Oncology

## 2023-12-12 ENCOUNTER — LAB (OUTPATIENT)
Dept: LAB | Facility: HOSPITAL | Age: 73
End: 2023-12-12
Payer: MEDICARE

## 2023-12-12 ENCOUNTER — HOSPITAL ENCOUNTER (OUTPATIENT)
Dept: ONCOLOGY | Facility: HOSPITAL | Age: 73
Discharge: HOME | End: 2023-12-12
Admitting: INTERNAL MEDICINE
Payer: MEDICARE

## 2023-12-12 VITALS
DIASTOLIC BLOOD PRESSURE: 67 MMHG | HEART RATE: 83 BPM | TEMPERATURE: 98.6 F | HEIGHT: 72 IN | RESPIRATION RATE: 18 BRPM | WEIGHT: 195 LBS | SYSTOLIC BLOOD PRESSURE: 102 MMHG | BODY MASS INDEX: 26.41 KG/M2

## 2023-12-12 DIAGNOSIS — C34.31 MALIGNANT NEOPLASM OF LOWER LOBE OF RIGHT LUNG: Primary | ICD-10-CM

## 2023-12-12 DIAGNOSIS — C34.31 MALIGNANT NEOPLASM OF LOWER LOBE OF RIGHT LUNG: ICD-10-CM

## 2023-12-12 LAB
BASOPHILS # BLD AUTO: 0.01 10*3/MM3 (ref 0–0.2)
BASOPHILS NFR BLD AUTO: 0.3 % (ref 0–1.5)
COLLECT DATE SP2 STL: NORMAL
COLLECT DATE SP3 STL: NORMAL
COLLECT DATE STL: NORMAL
DEPRECATED RDW RBC AUTO: 50.9 FL (ref 37–54)
EOSINOPHIL # BLD AUTO: 0.02 10*3/MM3 (ref 0–0.4)
EOSINOPHIL NFR BLD AUTO: 0.6 % (ref 0.3–6.2)
ERYTHROCYTE [DISTWIDTH] IN BLOOD BY AUTOMATED COUNT: 14.2 % (ref 12.3–15.4)
GASTROCULT GAST QL: NEGATIVE
HCT VFR BLD AUTO: 34.5 % (ref 37.5–51)
HEMOCCULT SP2 STL QL: NEGATIVE
HEMOCCULT SP3 STL QL: NEGATIVE
HGB BLD-MCNC: 11.4 G/DL (ref 13–17.7)
IMM GRANULOCYTES # BLD AUTO: 0.01 10*3/MM3 (ref 0–0.05)
IMM GRANULOCYTES NFR BLD AUTO: 0.3 % (ref 0–0.5)
LYMPHOCYTES # BLD AUTO: 1.31 10*3/MM3 (ref 0.7–3.1)
LYMPHOCYTES NFR BLD AUTO: 41.3 % (ref 19.6–45.3)
Lab: NORMAL
MCH RBC QN AUTO: 31.6 PG (ref 26.6–33)
MCHC RBC AUTO-ENTMCNC: 33 G/DL (ref 31.5–35.7)
MCV RBC AUTO: 95.6 FL (ref 79–97)
MONOCYTES # BLD AUTO: 0.1 10*3/MM3 (ref 0.1–0.9)
MONOCYTES NFR BLD AUTO: 3.2 % (ref 5–12)
NEUTROPHILS NFR BLD AUTO: 1.72 10*3/MM3 (ref 1.7–7)
NEUTROPHILS NFR BLD AUTO: 54.3 % (ref 42.7–76)
PLATELET # BLD AUTO: 81 10*3/MM3 (ref 140–450)
PMV BLD AUTO: 9.2 FL (ref 6–12)
RBC # BLD AUTO: 3.61 10*6/MM3 (ref 4.14–5.8)
WBC NRBC COR # BLD AUTO: 3.17 10*3/MM3 (ref 3.4–10.8)

## 2023-12-12 PROCEDURE — 82270 OCCULT BLOOD FECES: CPT

## 2023-12-12 PROCEDURE — 25010000002 HEPARIN LOCK FLUSH PER 10 UNITS: Performed by: INTERNAL MEDICINE

## 2023-12-12 PROCEDURE — 85025 COMPLETE CBC W/AUTO DIFF WBC: CPT | Performed by: INTERNAL MEDICINE

## 2023-12-12 PROCEDURE — 36591 DRAW BLOOD OFF VENOUS DEVICE: CPT

## 2023-12-12 RX ORDER — SODIUM CHLORIDE 0.9 % (FLUSH) 0.9 %
10 SYRINGE (ML) INJECTION AS NEEDED
OUTPATIENT
Start: 2023-12-12

## 2023-12-12 RX ORDER — HEPARIN SODIUM (PORCINE) LOCK FLUSH IV SOLN 100 UNIT/ML 100 UNIT/ML
500 SOLUTION INTRAVENOUS AS NEEDED
Status: DISCONTINUED | OUTPATIENT
Start: 2023-12-12 | End: 2023-12-13 | Stop reason: HOSPADM

## 2023-12-12 RX ORDER — HEPARIN SODIUM (PORCINE) LOCK FLUSH IV SOLN 100 UNIT/ML 100 UNIT/ML
500 SOLUTION INTRAVENOUS AS NEEDED
OUTPATIENT
Start: 2023-12-12

## 2023-12-12 RX ADMIN — HEPARIN 500 UNITS: 100 SYRINGE at 11:44

## 2023-12-14 ENCOUNTER — PREP FOR SURGERY (OUTPATIENT)
Dept: OTHER | Facility: HOSPITAL | Age: 73
End: 2023-12-14
Payer: MEDICARE

## 2023-12-14 DIAGNOSIS — R91.1 NODULE OF LOWER LOBE OF RIGHT LUNG: Primary | ICD-10-CM

## 2023-12-14 DIAGNOSIS — R94.5 ABNORMAL RESULTS OF LIVER FUNCTION STUDIES: ICD-10-CM

## 2023-12-14 DIAGNOSIS — Z03.89 ENCOUNTER FOR OBSERVATION FOR OTHER SUSPECTED DISEASES AND CONDITIONS RULED OUT: ICD-10-CM

## 2023-12-14 DIAGNOSIS — R79.9 ABNORMAL FINDING OF BLOOD CHEMISTRY, UNSPECIFIED: ICD-10-CM

## 2023-12-14 RX ORDER — GABAPENTIN 100 MG/1
100 CAPSULE ORAL ONCE
OUTPATIENT
Start: 2023-12-14 | End: 2023-12-14

## 2023-12-14 RX ORDER — KETOROLAC TROMETHAMINE 15 MG/ML
15 INJECTION, SOLUTION INTRAMUSCULAR; INTRAVENOUS ONCE
OUTPATIENT
Start: 2023-12-14 | End: 2023-12-14

## 2023-12-14 RX ORDER — CHLORHEXIDINE GLUCONATE 500 MG/1
CLOTH TOPICAL EVERY 12 HOURS PRN
OUTPATIENT
Start: 2023-12-29

## 2023-12-27 ENCOUNTER — HOSPITAL ENCOUNTER (OUTPATIENT)
Dept: PET IMAGING | Facility: HOSPITAL | Age: 73
Discharge: HOME | End: 2023-12-27
Payer: MEDICARE

## 2023-12-27 DIAGNOSIS — C34.31 MALIGNANT NEOPLASM OF LOWER LOBE OF RIGHT LUNG: ICD-10-CM

## 2023-12-27 LAB — GLUCOSE BLDC GLUCOMTR-MCNC: 103 MG/DL (ref 70–130)

## 2023-12-27 PROCEDURE — A9552 F18 FDG: HCPCS | Performed by: INTERNAL MEDICINE

## 2023-12-27 PROCEDURE — 82948 REAGENT STRIP/BLOOD GLUCOSE: CPT

## 2023-12-27 PROCEDURE — 0 FLUDEOXYGLUCOSE F18 SOLUTION: Performed by: INTERNAL MEDICINE

## 2023-12-27 PROCEDURE — 78815 PET IMAGE W/CT SKULL-THIGH: CPT

## 2023-12-27 RX ADMIN — FLUDEOXYGLUCOSE F 18 1 DOSE: 200 INJECTION, SOLUTION INTRAVENOUS at 08:58

## 2023-12-28 ENCOUNTER — OFFICE VISIT (OUTPATIENT)
Dept: ONCOLOGY | Facility: CLINIC | Age: 73
End: 2023-12-28
Payer: MEDICARE

## 2023-12-28 ENCOUNTER — HOSPITAL ENCOUNTER (OUTPATIENT)
Dept: ONCOLOGY | Facility: HOSPITAL | Age: 73
Discharge: HOME | End: 2023-12-28
Admitting: INTERNAL MEDICINE
Payer: MEDICARE

## 2023-12-28 VITALS
WEIGHT: 196 LBS | HEIGHT: 72 IN | HEART RATE: 77 BPM | DIASTOLIC BLOOD PRESSURE: 75 MMHG | OXYGEN SATURATION: 94 % | TEMPERATURE: 97.4 F | BODY MASS INDEX: 26.55 KG/M2 | SYSTOLIC BLOOD PRESSURE: 146 MMHG

## 2023-12-28 DIAGNOSIS — C34.31 MALIGNANT NEOPLASM OF LOWER LOBE OF RIGHT LUNG: Primary | ICD-10-CM

## 2023-12-28 DIAGNOSIS — Z09 CHEMOTHERAPY FOLLOW-UP EXAMINATION: ICD-10-CM

## 2023-12-28 LAB
ALBUMIN SERPL-MCNC: 4 G/DL (ref 3.5–5.2)
ALBUMIN/GLOB SERPL: 1.1 G/DL
ALP SERPL-CCNC: 97 U/L (ref 39–117)
ALT SERPL W P-5'-P-CCNC: 10 U/L (ref 1–41)
ANION GAP SERPL CALCULATED.3IONS-SCNC: 12 MMOL/L (ref 5–15)
AST SERPL-CCNC: 13 U/L (ref 1–40)
BASOPHILS # BLD AUTO: 0.02 10*3/MM3 (ref 0–0.2)
BASOPHILS NFR BLD AUTO: 0.2 % (ref 0–1.5)
BILIRUB SERPL-MCNC: 0.2 MG/DL (ref 0–1.2)
BUN SERPL-MCNC: 9 MG/DL (ref 8–23)
BUN/CREAT SERPL: 9.8 (ref 7–25)
CALCIUM SPEC-SCNC: 9.3 MG/DL (ref 8.6–10.5)
CHLORIDE SERPL-SCNC: 101 MMOL/L (ref 98–107)
CO2 SERPL-SCNC: 23 MMOL/L (ref 22–29)
CREAT SERPL-MCNC: 0.92 MG/DL (ref 0.76–1.27)
DEPRECATED RDW RBC AUTO: 54.8 FL (ref 37–54)
EGFRCR SERPLBLD CKD-EPI 2021: 87.3 ML/MIN/1.73
EOSINOPHIL # BLD AUTO: 0.01 10*3/MM3 (ref 0–0.4)
EOSINOPHIL NFR BLD AUTO: 0.1 % (ref 0.3–6.2)
ERYTHROCYTE [DISTWIDTH] IN BLOOD BY AUTOMATED COUNT: 15.5 % (ref 12.3–15.4)
FERRITIN SERPL-MCNC: 286.8 NG/ML (ref 30–400)
GLOBULIN UR ELPH-MCNC: 3.7 GM/DL
GLUCOSE SERPL-MCNC: 106 MG/DL (ref 65–99)
HCT VFR BLD AUTO: 36 % (ref 37.5–51)
HGB BLD-MCNC: 11.7 G/DL (ref 13–17.7)
IMM GRANULOCYTES # BLD AUTO: 0.02 10*3/MM3 (ref 0–0.05)
IMM GRANULOCYTES NFR BLD AUTO: 0.2 % (ref 0–0.5)
LYMPHOCYTES # BLD AUTO: 2.28 10*3/MM3 (ref 0.7–3.1)
LYMPHOCYTES NFR BLD AUTO: 26.8 % (ref 19.6–45.3)
MCH RBC QN AUTO: 31.5 PG (ref 26.6–33)
MCHC RBC AUTO-ENTMCNC: 32.5 G/DL (ref 31.5–35.7)
MCV RBC AUTO: 97 FL (ref 79–97)
MONOCYTES # BLD AUTO: 1.24 10*3/MM3 (ref 0.1–0.9)
MONOCYTES NFR BLD AUTO: 14.6 % (ref 5–12)
NEUTROPHILS NFR BLD AUTO: 4.94 10*3/MM3 (ref 1.7–7)
NEUTROPHILS NFR BLD AUTO: 58.1 % (ref 42.7–76)
PLATELET # BLD AUTO: 202 10*3/MM3 (ref 140–450)
PMV BLD AUTO: 8.2 FL (ref 6–12)
POTASSIUM SERPL-SCNC: 4.3 MMOL/L (ref 3.5–5.2)
PROT SERPL-MCNC: 7.7 G/DL (ref 6–8.5)
RBC # BLD AUTO: 3.71 10*6/MM3 (ref 4.14–5.8)
SODIUM SERPL-SCNC: 136 MMOL/L (ref 136–145)
WBC NRBC COR # BLD AUTO: 8.51 10*3/MM3 (ref 3.4–10.8)

## 2023-12-28 PROCEDURE — 82728 ASSAY OF FERRITIN: CPT | Performed by: INTERNAL MEDICINE

## 2023-12-28 PROCEDURE — 80053 COMPREHEN METABOLIC PANEL: CPT | Performed by: INTERNAL MEDICINE

## 2023-12-28 PROCEDURE — 36591 DRAW BLOOD OFF VENOUS DEVICE: CPT

## 2023-12-28 PROCEDURE — 25010000002 HEPARIN LOCK FLUSH PER 10 UNITS: Performed by: INTERNAL MEDICINE

## 2023-12-28 PROCEDURE — 85025 COMPLETE CBC W/AUTO DIFF WBC: CPT | Performed by: INTERNAL MEDICINE

## 2023-12-28 RX ORDER — SODIUM CHLORIDE 0.9 % (FLUSH) 0.9 %
10 SYRINGE (ML) INJECTION AS NEEDED
OUTPATIENT
Start: 2023-12-28

## 2023-12-28 RX ORDER — HEPARIN SODIUM (PORCINE) LOCK FLUSH IV SOLN 100 UNIT/ML 100 UNIT/ML
500 SOLUTION INTRAVENOUS AS NEEDED
OUTPATIENT
Start: 2023-12-28

## 2023-12-28 RX ORDER — HEPARIN SODIUM (PORCINE) LOCK FLUSH IV SOLN 100 UNIT/ML 100 UNIT/ML
500 SOLUTION INTRAVENOUS AS NEEDED
Status: DISCONTINUED | OUTPATIENT
Start: 2023-12-28 | End: 2023-12-29 | Stop reason: HOSPADM

## 2023-12-28 RX ORDER — OMEPRAZOLE 40 MG/1
40 CAPSULE, DELAYED RELEASE ORAL DAILY
Qty: 30 CAPSULE | Refills: 5 | Status: SHIPPED | OUTPATIENT
Start: 2023-12-28

## 2023-12-28 RX ADMIN — HEPARIN 500 UNITS: 100 SYRINGE at 10:44

## 2023-12-28 NOTE — PROGRESS NOTES
[facility] [place]  Office number [phone]    Fax number [phone]     Follow up     Date: 23    Patient Name: David Barfield  MRN: [id number]  : 1950    Referring Physician: Dr. Sampson    Chief Complaint: Nonsmall cell lung cancer follow-up on treatment    Cancer Staging:  Cancer Staging   Stage IIIA (cT2b, cN2, cM0)    History of Present Illness: David Barfield is a pleasant 73 y.o. male who presents today for evaluation of NSCLC. He has a 50 pack year smoking history.    He has a history of a PET avid subpleural RLL lung nodule [facility] [place] [facility]. This had an SUV of 9.8 on PET  in association with increased mediastinal LN uptake with SUV around 3. Imaging was felt secondary to osteophyte fibrosis. He did undergo bronchoscopy 2020 with negative cytology. The RLL lung nodule was not felt amenable to bronchoscopy biopsy.    CT lung screen 2023 showed:      PET CT showed mass in the RLL intensely SUV avid, max 17. Mediastinal LN not hypermetabolic above baseline.     Right lower lobe robotic transbronchial biopsy and cytology on 9/15/2023 showed benign findings. Cultures including AFT and fungal were negative.  Station 11L, Station 4L LN negative  Station 7 LN showed NSCLCa (positive for cytokeratin 7, p63, and TTF-1 with no significant staining for p40 or Napsin. The staining pattern raises the possibility of mixed adenocarcinoma and squamous cell carcinoma differentiation in this tumor). PDL1 negative.    MRI brain was negative.    He is here for an opinion regarding management of newly diagnosed lung cancer.    He has a history of sick sinus syndrome s/p PPM and moderate COPD. He describes only mild physical limitations from this. For example he recently walked 1.5 miles at [facility]. At home, he climbs 17 steps without issue. He does sit down with long activities.     Treatment history:  Carbo, taxol, nivo, C1  10/24/23; C2 11/15/23; C3 11/21/23    Interval history:   He is here in the company of his significant other and 2 daughters for follow-up and review of PET results.  He has been experiencing ongoing epigastric pain and dyspepsia.  He is utilizing Tums frequently with partial relief.  He is no longer taking iron and has not noted any black stools.  He has not picked up or initiated the PPI prescription that was called in for him on 11/21/2023.  His significant other did call the pharmacy during our interview and verified that the prescription is available for him to fill.  He is taking Zofran with adequate nausea relief.  He has had some intermittent diarrhea but none persistent.  The last episode was 2 days ago after eating [place].  Yesterday he had a solid bowel movement he has had no diarrhea today.  Continues to experience dyspnea on exertion.  He does have asthma and is not utilizing his inhaler.  His symptoms are not worsening but are stable since most recent chemotherapy administration.    Past Medical History:   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    Diabetes mellitus Borderline    Emphysema/COPD     Erectile disorder     GERD (gastroesophageal reflux disease)     Hyperlipidemia     Hypertension 2019    Lung nodule     Mumps     Mumps     Pruritus     after bath    Slow to wake up after anesthesia     Wears dentures     upper only    Wears hearing aid in both ears     usually only wears right   No personal history of myocardial infarction, cerebrovascular event, or venous thromboembolism.  No autoimmune diseases.   No prior cscope, mailed cologuard recently    Past Surgical History:   Past Surgical History:   Procedure Laterality Date    BONE BIOPSY      broken bone surgery in his face    BRONCHOSCOPY WITH ION ROBOTIC ASSIST N/A 9/15/2023    Procedure: BRONCHOSCOPY NAVIGATION WITH  ENDOBRONCHIAL ULTRASOUND AND ION ROBOT;  Surgeon: Octaviano Sampson MD;  Location:  [facility];  Service: Robotics - Pulmonary;  Laterality: N/A;  ion #6 - [id number]  - 0015  Cath guide 0061    EBUS balloon removed and intact    CARDIAC ELECTROPHYSIOLOGY PROCEDURE N/A 08/17/2021    Procedure: Pacemaker DC new;  Surgeon: Kayy Box MD;  Location:  [facility];  Service: Cardiology;  Laterality: N/A;    FACIAL FRACTURE SURGERY      INSERT / REPLACE / REMOVE PACEMAKER  August 17, 2021     Family History:   Family History   Problem Relation Age of Onset    Aneurysm Mother         brain    Dementia Father     Leukemia Sister     Hypertension Paternal Grandfather     Heart disease Paternal Grandmother    Sister leukemia at age 21  No other malignancy    Social History:   Social History     Socioeconomic History    Marital status:     Number of children: 3   Tobacco Use    Smoking status: Every Day     Packs/day: 1.00     Years: 53.00     Additional pack years: 0.00     Total pack years: 53.00     Types: Cigarettes     Start date: 1/1/1968    Smokeless tobacco: Never    Tobacco comments:     Still smoke about 1 ppd   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, [place]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  Rebuilds cars, 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    Medications:     Current Outpatient Medications:     albuterol sulfate  (90 Base) MCG/ACT inhaler, Inhale 2 puffs Every 4 (Four) Hours As Needed for Wheezing., Disp: 18 g, Rfl: 11    fluticasone (VERAMYST) 27.5 MCG/SPRAY nasal spray, 2 sprays into the nostril(s) as directed by provider 1 (One) Time As Needed for Rhinitis or Allergies., Disp: 6 mL, Rfl: 2    Fluticasone-Umeclidin-Vilant (Trelegy Ellipta) 100-62.5-25 MCG/ACT inhaler, Inhale 1 puff Daily., Disp: 3  "each, Rfl: 3    lidocaine-prilocaine (EMLA) 2.5-2.5 % cream, Apply 1 application  topically to the appropriate area as directed As Needed (45-60 minutes prior to port access.  Cover with saran/plastic wrap.)., Disp: 30 g, Rfl: 3    lisinopril (PRINIVIL,ZESTRIL) 2.5 MG tablet, Take 1 tablet by mouth As Needed (elevated blood pressure). Take 1/2 tablet po prn (Patient taking differently: Take 1 tablet by mouth As Needed (elevated blood pressure systolic over 140). Take 1/2 tablet po prn), Disp: 30 tablet, Rfl: 1    nystatin susp + lidocaine viscous (MAGIC MOUTHWASH) oral suspension, 5-10 ml swish and spit or swallow QID prn, Disp: 240 mL, Rfl: 3    OLANZapine (zyPREXA) 5 MG tablet, Take 1 tablet by mouth Every Night. Take nightly x 4 starting night of chemotherapy. (Patient not taking: Reported on 10/23/2023), Disp: 4 tablet, Rfl: 2    omeprazole (priLOSEC) 40 MG capsule, Take 1 capsule by mouth Daily., Disp: 30 capsule, Rfl: 5    ondansetron (ZOFRAN) 8 MG tablet, Take 1 tablet by mouth 3 (Three) Times a Day As Needed for Nausea or Vomiting., Disp: 30 tablet, Rfl: 2    pravastatin (PRAVACHOL) 80 MG tablet, Take 1 tablet by mouth Every Night., Disp: 30 tablet, Rfl: 11    sildenafil (VIAGRA) 100 MG tablet, Take 0.5 tablets by mouth Daily As Needed for Erectile Dysfunction., Disp: , Rfl:     Allergies:   Allergies   Allergen Reactions    Latex Other (See Comments)     Latex allergy     Tape Rash       Objective     Vital Signs:   Vitals:    12/28/23 0904   BP: 146/75   Pulse: 77   Temp: 97.4 °F (36.3 °C)   TempSrc: Temporal   SpO2: 94%   Weight: 88.9 kg (196 lb)   Height: 182.9 cm (72.01\")   PainSc: 0-No pain    Body mass index is 26.58 kg/m².   Pain Score    12/28/23 0904   PainSc: 0-No pain       ECOG Performance Status: 1 - Symptomatic but completely ambulatory    Physical Exam:   General: No acute distress. Well appearing   HEENT: Normocephalic, atraumatic. Sclera anicteric.   Neck: supple, no adenopathy. "   Cardiovascular: regular rate and rhythm. No murmurs.   Respiratory: Wheezing, good air movement.  Abdomen: Soft, nontender, non distended with normoactive bowel sounds  Lymph: no cervical, supraclavicular or axillary adenopathy  Neuro: Alert and oriented x 3. No focal deficits.   Ext: Symmetric, no swelling.   Skin: Port site clean dry and intact     Laboratory/Imaging Reviewed:   Hospital Outpatient Visit on 12/28/2023   Component Date Value Ref Range Status    Glucose 12/28/2023 106 (H)  65 - 99 mg/dL Final    BUN 12/28/2023 9  8 - 23 mg/dL Final    Creatinine 12/28/2023 0.92  0.76 - 1.27 mg/dL Final    Sodium 12/28/2023 136  136 - 145 mmol/L Final    Potassium 12/28/2023 4.3  3.5 - 5.2 mmol/L Final    Chloride 12/28/2023 101  98 - 107 mmol/L Final    CO2 12/28/2023 23.0  22.0 - 29.0 mmol/L Final    Calcium 12/28/2023 9.3  8.6 - 10.5 mg/dL Final    Total Protein 12/28/2023 7.7  6.0 - 8.5 g/dL Final    Albumin 12/28/2023 4.0  3.5 - 5.2 g/dL Final    ALT (SGPT) 12/28/2023 10  1 - 41 U/L Final    AST (SGOT) 12/28/2023 13  1 - 40 U/L Final    Alkaline Phosphatase 12/28/2023 97  39 - 117 U/L Final    Total Bilirubin 12/28/2023 0.2  0.0 - 1.2 mg/dL Final    Globulin 12/28/2023 3.7  gm/dL Final    Calculated Result    A/G Ratio 12/28/2023 1.1  g/dL Final    BUN/Creatinine Ratio 12/28/2023 9.8  7.0 - 25.0 Final    Anion Gap 12/28/2023 12.0  5.0 - 15.0 mmol/L Final    eGFR 12/28/2023 87.3  >60.0 mL/min/1.73 Final    Ferritin 12/28/2023 286.80  30.00 - 400.00 ng/mL Final    WBC 12/28/2023 8.51  3.40 - 10.80 10*3/mm3 Final    RBC 12/28/2023 3.71 (L)  4.14 - 5.80 10*6/mm3 Final    Hemoglobin 12/28/2023 11.7 (L)  13.0 - 17.7 g/dL Final    Hematocrit 12/28/2023 36.0 (L)  37.5 - 51.0 % Final    MCV 12/28/2023 97.0  79.0 - 97.0 fL Final    MCH 12/28/2023 31.5  26.6 - 33.0 pg Final    MCHC 12/28/2023 32.5  31.5 - 35.7 g/dL Final    RDW 12/28/2023 15.5 (H)  12.3 - 15.4 % Final    RDW-SD 12/28/2023 54.8 (H)  37.0 - 54.0 fl Final     MPV 12/28/2023 8.2  6.0 - 12.0 fL Final    Platelets 12/28/2023 202  140 - 450 10*3/mm3 Final    Neutrophil % 12/28/2023 58.1  42.7 - 76.0 % Final    Lymphocyte % 12/28/2023 26.8  19.6 - 45.3 % Final    Monocyte % 12/28/2023 14.6 (H)  5.0 - 12.0 % Final    Eosinophil % 12/28/2023 0.1 (L)  0.3 - 6.2 % Final    Basophil % 12/28/2023 0.2  0.0 - 1.5 % Final    Immature Grans % 12/28/2023 0.2  0.0 - 0.5 % Final    Neutrophils, Absolute 12/28/2023 4.94  1.70 - 7.00 10*3/mm3 Final    Lymphocytes, Absolute 12/28/2023 2.28  0.70 - 3.10 10*3/mm3 Final    Monocytes, Absolute 12/28/2023 1.24 (H)  0.10 - 0.90 10*3/mm3 Final    Eosinophils, Absolute 12/28/2023 0.01  0.00 - 0.40 10*3/mm3 Final    Basophils, Absolute 12/28/2023 0.02  0.00 - 0.20 10*3/mm3 Final    Immature Grans, Absolute 12/28/2023 0.02  0.00 - 0.05 10*3/mm3 Final   Hospital Outpatient Visit on 12/27/2023   Component Date Value Ref Range Status    Glucose 12/27/2023 103  70 - 130 mg/dL Final       NM PET/CT Skull Base to Mid Thigh    Result Date: 12/27/2023  Narrative: NM PET/CT SKULL BASE TO MID THIGH Date of Exam: 12/27/2023 8:55 AM EST Indication: Non-small cell lung cancer (NSCLC), monitor post neoadjuvant. Comparison: CT chest 9/8/2023, PET/CT 9/8/2023 Technique: 13.0 mCi of F-18 FDG was administered intravenously. PET imaging was obtained from skull base to mid-thigh approximately 60 minutes after radiotracer injection. A low dose non contrast CT was obtained for attenuation correction and anatomic localization. Fused PET-CT and 3D MIP reconstructions were utilized for image interpretation.  Fasting blood glucose level: 103 mg/dl. Reference uptake values: Mediastinum: 3.2 SUVmax Liver: 3.5 SUVmax Normalization method: Body Weight Findings: Head and neck: Normal symmetric physiologic uptake within the brain. Physiologic uptake in the oropharynx and muscles of phonation. No hypermetabolic neck mass or cervical lymph node. Chest: Decreased size and decreased  hypermetabolism of right lower lobe lung malignancy. This now measures approximately 1.6 cm in maximal diameter, with SUV max 3.5, previously 2.5 cm in diameter with SUV max 17.2. No new or enlarging pulmonary nodules noting limitations of low-dose CT technique. No additional hypermetabolic pulmonary parenchymal process. Similar appearance of a few shotty right paratracheal and right supraclavicular lymph nodes demonstrating low level FDG avidity with SUV max 3.3 in the right lower paratracheal station; previously this measured SUV max 2.7. Redemonstration of cardiac pacer with right chest pulse generator. A left-sided chest port is in place. There is some physiologic uptake in the left ventricular myocardium. Abdomen and pelvis: No focal hypermetabolism to suggest primary or metastatic visceral or noah disease within the abdomen or pelvis. There is physiologic uptake in the GI and  tracts. No discrete adrenal nodule. There is sigmoid colonic diverticulosis without diverticulitis. There is heavy atherosclerosis of the abdominal aorta and iliac branches without evidence of aneurysm. Bones and body wall soft tissues: No focal or suspicious uptake in the body wall soft tissues or osseous structures. No discrete bony lesion on the CT images. No acute osseous findings. There is some linear uptake in the superficial tissues of the right antecubital fossa at the site of radiotracer injection.     Impression: Impression: Decreased size and decreased hypermetabolism of the known primary malignancy in the right lower lobe compatible with treatment response. Similar appearance of a few shotty right paratracheal and right supraclavicular lymph nodes with low-level FDG avidity, somewhat nonspecific. No evidence of new or distant metastatic disease. Electronically Signed: Godfrey Real MD  12/27/2023 10:41 AM EST  Workstation ID: [id number]    ECHO COMPLETE (DOPPLER / COLOR) W OR WO CONTRAST    Result Date:  2023  Narrative: TRANSTHORACIC ECHOCARDIOGRAPHY REPORT  Demographics  Patient Name:          RODGER ROB  :            1950  Medical Record Number: [id number]          Age:            73 year(s)  Corporate ID Number:   [id number]          Gender          Male  Account Number:        [id number]  Sonographer:           Faiza Jakub TREJO Height:         70 inches  Referring Physician:   OJHN NAVAS  Weight:         194.01 pounds  Interpreting           MEDARDO OBRIEN MD        BMI:            27.84 kg/m^2  Physician:  Date of Service:       2023          Blood Pressure: 168/100 mmHg  Room Number:           OP Type of Study:  TTE procedure: ECHO COMPLETE (DOPPLER / COLOR) W OR WO CONTRAST.  Patient Status: Routine OP  Study Location: Echo LabTechnical Quality: Adequate visualization  Impression:  Indication: Other forms of chronic ischemic heart disease I25.89  ########################################  Normal sized left ventricle. Moderate left ventricular hypertrophy.  Visually estimated ejection fraction 55% +/- 5%. Abnormal systolic strain  pattern. Normal left ventricular diastolic function.  No significant valvular heart disease.  ######################################## Measurements Summary:  LVEDd: 4.16 cm         LVESd: 3.03 cm          IVSEd: 1.45 cm  AO Root:3.76 cm        LVPWd: 1.38 cm  Contractility Score  At rest the following contractility abnormalities were noted: Hypokinesis  of the Basal infero-lateral, the Basal jalen-septal, the Basal  infero-septal, the Basal inferior and the Basal jalen-lateral segments.  Contractility of all other segments appeared normal.  LV regional wall motion: (0-Not visualized 1-Normal 2-Hypokinesis  3-Akinesis 4-Dyskinesis 5-Aneurysm)  Left Ventricle  Peak E-wave: 0.48   Peak A-wave: 0.62 m/s   E/A ratio: 0.78  m/s                 Volume [id number]   LV length: 8.71 cm  ml  Volume [id number] ml  LVOT diameter: 2.41 cm  Normal sized  left ventricle.  Moderate left ventricular hypertrophy.  Visually estimated ejection fraction 55% +/- 5%.  Abnormal systolic strain pattern.  Normal left ventricular diastolic function.  No left ventricular masses or thrombi.  Right Ventricle  Diastolic dimension: 3.05     RV systolic pressure: 21.97 mmHg  cm  Normal sized right ventricle.  Pacing electrode in the right ventricle.  Normal right ventricular function.  Left Atrium  LA dimension: 3.34 cm               LA volume:52.02 ml  LA/Aorta: 0.89  Normal sized left atrium.  Normal left atrial volume index  Intact atrial septum.  No atrial mass or thrombus.  Right Atrium  Normal sized right atrium.  Pacer wire crosses the tricuspid valve.  Intact atrial septum.  No atrial mass or thrombus.  Mitral Valve  Deceleration time: 197.82 msec  Thickened mitral valve leaflets.  Trace mitral regurgitation.  No mitral stenosis.  No masses or vegetations seen.  Aortic Valve  AI P1/2t: 674.17 msec  LVOT VTI: 18.35 cm         Deceleration time: 2324.74 msec  Mildly thickend free edges of the aortic valve leaflets.  Mild aortic valve regurgitation.  No aortic stenosis.  No masses or vegetations seen.  Tricuspid Valve  TR velocity: 2.18 m/s     TR gradient: 18.34053 mmHg  Estimated RAP: 3 mmHg     RVSP: 21.97 mmHg  Structurally normal tricuspid valve.  Trace tricuspid regurgitation.  No tricuspid stenosis.  No masses or vegetations seen.  Pulmonic Valve  Acceleration time: 89.97 msec           PASP: 21.97 mmHg  Structurally normal pulmonic valve.  Abnormal pulmonary acceleration time.  No pulmonic regurgitation.  No pulmonic stenosis.  No masses or vegetations seen. Great Vessels Aorta  Aortic Root: 3.76 cm  Ascending Aorta: 3.71 cm  LVOT Diameter: 2.41 cm Visualized aorta is normal. Mildly dilated aortic root 3.8 cm. No evidence of dissection. Normal IVC with appropriate collapse.  Pericardium / Pleura No pericardial effusion.   ----------------------------------------------------------------  Electronically signed by MEDARDO OBRIEN MD(Interpreting Physician)  on 12/01/2023 16:31  ----------------------------------------------------------------     Procedures    Bronch 6/2020  Findings:       An EBUS bronchoscope was advanced through the endotracheal tube under        general anesthesia. A comprehensive EBUS survey of all available lymph        node stations revealed lymphadenopathy with benign sonographic features        in stations 11L (9 mm), 4L (8 mm), 7 (15 mm), 4R (11 mm), and 11R (10        mm). EBUS-TBNA x 4 passes (at least) was done at each of those stations        in that sequence with the Olympus Soundl.lyiShot 21G and 22G needles.       We then withdrew the EBUS scope and inserted a therapeutic scope into        the airway. A thorough airway exam to the first subsegmental level was        unremarkable without endobronchial lesions or secretions. A BAL was        obtained from the right lower lobe superior segment (RB6) (90 ml        instilled, 18 ml of cloudy fluid returned). Adequate hemostasis was        confirmed prior to withdrawing the scope. Patient tolerated procedure.  Impression:           Abnormal CT scan of chest                        1. Successful endobronchial ultrasound bronchoscopy                         with fine needle aspiration.                        2. Mediastinal/hilar lymphadenopathy with benign                         sonographic features in stations 11L, 4L, 7, 4R and 11R.                        3. EBUS-TBNA samples from stations 11L, 4L, 7, 4R and                         11R.                        4. Normal airway anatomy without active bleeding,                         endobronchial lesions or secretions.                        5. BAL from RLL     11R Lymph Node, (EBUS) Fine Needle Aspiration:  - Negative for malignancy  - Abundant lymphoid tissue and histiocytes with anthracotic pigments.     4R Lymph Node,  (EBUS) Fine Needle Aspiration:  - Negative for malignancy.  - Abundant lymphoid tissue present.     Subcarinal Lymph Node, (EBUS) Fine Needle Aspiration:  - Negative for malignancy  - Abundant lymphoid tissue present.     4L Lymph Node, (EBUS) Fine Needle Aspiration:  - Negative for malignancy  - Abundant lymphoid tissue.     11L Lymph Node, (EBUS) Fine Needle Aspiration:  - Negative for malignancy.  - Abundant lymphoid tissue and histiocytes with anthracotic pigments.     Bronchoalveolar Lavage, RLL (ThinPrep only):  NO MALIGNANT CELLS IDENTIFIED  Benign respiratory epithelial cells and pulmonary macrophages       Microbiology Results     Date and Time Order Name Sensitivity Status Organisms Specimen ID Source     2020 1030 Fungus Culture and Smear   Preliminary   [id number] Lung     2020 1030 BAL/Brush Culture plus Stain, Semiquant.   Final   [id number] Lung     2020 1030 Acid Fast Culture Plus Stain   Preliminary   [id number] Lung   TRANSTHORACIC ECHOCARDIOGRAPHY REPORT   Demographics   Patient Name:          RODGER ROB  :            1950   Medical Record Number: [id number]          Age:            73 year(s)   Corporate ID Number:   [id number]          Gender          Male   Account Number:        [id number]   Sonographer:           Faiza TREJO Height:         70 inches   Referring Physician:   JOHN ANVAS  Weight:         194.01 pounds   Interpreting           MEDARDO OBRIEN MD        BMI:            27.84 kg/m^2   Physician:   Date of Service:       2023          Blood Pressure: 168/100 mmHg   Room Number:           OP  Type of Study:   TTE procedure: ECHO COMPLETE (DOPPLER / COLOR) W OR WO CONTRAST.   Patient Status: Routine OP   Study Location: Echo LabTechnical Quality: Adequate visualization   Impression:   Indication: Other forms of chronic ischemic heart disease I25.89   ########################################   Normal sized left ventricle. Moderate left  ventricular hypertrophy.   Visually estimated ejection fraction 55% +/- 5%. Abnormal systolic strain   pattern. Normal left ventricular diastolic function.   No significant valvular heart disease.   ########################################  Measurements Summary:   LVEDd: 4.16 cm         LVESd: 3.03 cm          IVSEd: 1.45 cm   AO Root:3.76 cm        LVPWd: 1.38 cm   Contractility Score   At rest the following contractility abnormalities were noted: Hypokinesis   of the Basal infero-lateral, the Basal jalen-septal, the Basal   infero-septal, the Basal inferior and the Basal jalen-lateral segments.   Contractility of all other segments appeared normal.   LV regional wall motion: (0-Not visualized 1-Normal 2-Hypokinesis   3-Akinesis 4-Dyskinesis 5-Aneurysm)   Left Ventricle   Peak E-wave: 0.48   Peak A-wave: 0.62 m/s   E/A ratio: 0.78   m/s                 Volume [id number]   LV length: 8.71 cm   ml   Volume udiiutqk13.24 ml   LVOT diameter: 2.41 cm   Normal sized left ventricle.   Moderate left ventricular hypertrophy.   Visually estimated ejection fraction 55% +/- 5%.   Abnormal systolic strain pattern.   Normal left ventricular diastolic function.   No left ventricular masses or thrombi.   Right Ventricle   Diastolic dimension: 3.05     RV systolic pressure: 21.97 mmHg   cm   Normal sized right ventricle.   Pacing electrode in the right ventricle.   Normal right ventricular function.   Left Atrium   LA dimension: 3.34 cm               LA volume:52.02 ml   LA/Aorta: 0.89   Normal sized left atrium.   Normal left atrial volume index   Intact atrial septum.   No atrial mass or thrombus.   Right Atrium   Normal sized right atrium.   Pacer wire crosses the tricuspid valve.   Intact atrial septum.   No atrial mass or thrombus.   Mitral Valve   Deceleration time: 197.82 msec   Thickened mitral valve leaflets.   Trace mitral regurgitation.   No mitral stenosis.   No masses or vegetations seen.   Aortic Valve   AI  P1/2t: 674.17 msec   LVOT VTI: 18.35 cm         Deceleration time: 2324.74 msec   Mildly thickend free edges of the aortic valve leaflets.   Mild aortic valve regurgitation.   No aortic stenosis.   No masses or vegetations seen.   Tricuspid Valve   TR velocity: 2.18 m/s     TR gradient: 18.04459 mmHg   Estimated RAP: 3 mmHg     RVSP: 21.97 mmHg   Structurally normal tricuspid valve.   Trace tricuspid regurgitation.   No tricuspid stenosis.   No masses or vegetations seen.   Pulmonic Valve   Acceleration time: 89.97 msec           PASP: 21.97 mmHg   Structurally normal pulmonic valve.   Abnormal pulmonary acceleration time.   No pulmonic regurgitation.   No pulmonic stenosis.   No masses or vegetations seen.  Great Vessels  Aorta   Aortic Root: 3.76 cm   Ascending Aorta: 3.71 cm   LVOT Diameter: 2.41 cm  Visualized aorta is normal.  Mildly dilated aortic root 3.8 cm.  No evidence of dissection.  Normal IVC with appropriate collapse.   Pericardium / Pleura  No pericardial effusion.   ----------------------------------------------------------------   Electronically signed by MEDARDO OBRIEN MD(Interpreting Physician)   on 2023 16:31   ----------------------------------------------------------------  Procedure Note    Medardo Obrien MD - 2023  Formatting of this note might be different from the original.  TRANSTHORACIC ECHOCARDIOGRAPHY REPORT   Demographics   Patient Name:          RODGER ROB  :            1950   Medical Record Number: [id number]          Age:            73 year(s)   Corporate ID Number:   [id number]          Gender          Male   Account Number:        [id number]   Sonographer:           Faiza TREJO Height:         70 inches   Referring Physician:   JOHN NAVAS  Weight:         194.01 pounds   Interpreting           MEDARDO OBRIEN MD        BMI:            27.84 kg/m^2   Physician:   Date of Service:       2023          Blood Pressure: 168/100 mmHg   Room Number:            OP  Type of Study:   TTE procedure: ECHO COMPLETE (DOPPLER / COLOR) W OR WO CONTRAST.   Patient Status: Routine OP   Study Location: Echo LabTechnical Quality: Adequate visualization   Impression:   Indication: Other forms of chronic ischemic heart disease I25.89   ########################################   Normal sized left ventricle. Moderate left ventricular hypertrophy.   Visually estimated ejection fraction 55% +/- 5%. Abnormal systolic strain   pattern. Normal left ventricular diastolic function.   No significant valvular heart disease.   ########################################  Measurements Summary:   LVEDd: 4.16 cm         LVESd: 3.03 cm          IVSEd: 1.45 cm   AO Root:3.76 cm        LVPWd: 1.38 cm   Contractility Score   At rest the following contractility abnormalities were noted: Hypokinesis   of the Basal infero-lateral, the Basal jalen-septal, the Basal   infero-septal, the Basal inferior and the Basal jalen-lateral segments.   Contractility of all other segments appeared normal.   LV regional wall motion: (0-Not visualized 1-Normal 2-Hypokinesis   3-Akinesis 4-Dyskinesis 5-Aneurysm)   Left Ventricle   Peak E-wave: 0.48   Peak A-wave: 0.62 m/s   E/A ratio: 0.78   m/s                 Volume [id number]   LV length: 8.71 cm   ml   Volume [id number] ml   LVOT diameter: 2.41 cm   Normal sized left ventricle.   Moderate left ventricular hypertrophy.   Visually estimated ejection fraction 55% +/- 5%.   Abnormal systolic strain pattern.   Normal left ventricular diastolic function.   No left ventricular masses or thrombi.   Right Ventricle   Diastolic dimension: 3.05     RV systolic pressure: 21.97 mmHg   cm   Normal sized right ventricle.   Pacing electrode in the right ventricle.   Normal right ventricular function.   Left Atrium   LA dimension: 3.34 cm               LA volume:52.02 ml   LA/Aorta: 0.89   Normal sized left atrium.   Normal left atrial volume index   Intact atrial septum.    No atrial mass or thrombus.   Right Atrium   Normal sized right atrium.   Pacer wire crosses the tricuspid valve.   Intact atrial septum.   No atrial mass or thrombus.   Mitral Valve   Deceleration time: 197.82 msec   Thickened mitral valve leaflets.   Trace mitral regurgitation.   No mitral stenosis.   No masses or vegetations seen.   Aortic Valve   AI P1/2t: 674.17 msec   LVOT VTI: 18.35 cm         Deceleration time: 2324.74 msec   Mildly thickend free edges of the aortic valve leaflets.   Mild aortic valve regurgitation.   No aortic stenosis.   No masses or vegetations seen.   Tricuspid Valve   TR velocity: 2.18 m/s     TR gradient: 18.63657 mmHg   Estimated RAP: 3 mmHg     RVSP: 21.97 mmHg   Structurally normal tricuspid valve.   Trace tricuspid regurgitation.   No tricuspid stenosis.   No masses or vegetations seen.   Pulmonic Valve   Acceleration time: 89.97 msec           PASP: 21.97 mmHg   Structurally normal pulmonic valve.   Abnormal pulmonary acceleration time.   No pulmonic regurgitation.   No pulmonic stenosis.   No masses or vegetations seen.  Great Vessels  Aorta   Aortic Root: 3.76 cm   Ascending Aorta: 3.71 cm   LVOT Diameter: 2.41 cm  Visualized aorta is normal.  Mildly dilated aortic root 3.8 cm.  No evidence of dissection.  Normal IVC with appropriate collapse.   Pericardium / Pleura  No pericardial effusion.  Assessment / Plan      Assessment/Plan:     Nonsmall cell lung cancer of the RLL, Stage IIIA  Chemo immunotherapy monitoring  -He has an enlarging RLL nodule with SUV of 17. Despite negative transbronchial biopsy, he was found to have N2 disease with a positive level 7 LN. We discussed options for definitive treatment to include induction chemo immunotherapy followed by surgical resection, chemoradiation followed by surgical resection, or definitive chemoradiation. We discussed differences in schedules and side effects. He has no contraindications to immunotherapy and I recommended  nivolumab + chemotherapy induction.  His case was reviewed at multidisciplinary tumor board including thoracic surgery.  He was felt to be a good candidate for neoadjuvant chemo immunotherapy followed by resection assessment.  -I recommended nivolumab, carboplatin, paclitaxel x3 cycles in a neoadjuvant fashion.  -Tempus reviewed and shows TP53 mutatation  -I reviewed his PFTs and discussed with thoracic surgery.  His lung function is adequate for neoadjuvant chemoimmunotherapy approach.   -He completed his third cycle of chemoimmunotherapy 11/21/2023.  Posttreatment PET personally reviewed with patient and his family and compared to prior.  This is showed a nice response in his primary tumor.  There is mild residual uptake in mediastinal lymph nodes.  He has a consultation pending with thoracic surgery next week to determine resection candidacy.  I will tentatively plan to follow-up with patient postoperatively with final pathology review, but anticipate potential adjuvant immunotherapy.    3.  Epigastric pain  -Omeprazole 40 mg daily--has not been taking, reinforced this recommendation    4.  Chemotherapy-induced nausea  -Well-controlled on as needed Zofran    5.  COPD  -Encouraged him to utilize albuterol prior to exercise.    Follow Up:        Neetu Ashley MD  Hematology and Oncology

## 2023-12-29 ENCOUNTER — HOSPITAL ENCOUNTER (OUTPATIENT)
Dept: PULMONOLOGY | Facility: HOSPITAL | Age: 73
Discharge: HOME | End: 2023-12-29
Payer: MEDICARE

## 2023-12-29 ENCOUNTER — HOSPITAL ENCOUNTER (OUTPATIENT)
Dept: GENERAL RADIOLOGY | Facility: HOSPITAL | Age: 73
Discharge: HOME | End: 2023-12-29
Payer: MEDICARE

## 2023-12-29 ENCOUNTER — PRE-ADMISSION TESTING (OUTPATIENT)
Dept: PREADMISSION TESTING | Facility: HOSPITAL | Age: 73
End: 2023-12-29
Payer: MEDICARE

## 2023-12-29 VITALS — HEIGHT: 72 IN | WEIGHT: 198.19 LBS | BODY MASS INDEX: 26.84 KG/M2

## 2023-12-29 DIAGNOSIS — Z03.89 ENCOUNTER FOR OBSERVATION FOR OTHER SUSPECTED DISEASES AND CONDITIONS RULED OUT: ICD-10-CM

## 2023-12-29 DIAGNOSIS — R79.9 ABNORMAL FINDING OF BLOOD CHEMISTRY, UNSPECIFIED: ICD-10-CM

## 2023-12-29 DIAGNOSIS — R94.5 ABNORMAL RESULTS OF LIVER FUNCTION STUDIES: ICD-10-CM

## 2023-12-29 DIAGNOSIS — R91.1 NODULE OF LOWER LOBE OF RIGHT LUNG: ICD-10-CM

## 2023-12-29 DIAGNOSIS — Z01.89 LABORATORY TEST: Primary | ICD-10-CM

## 2023-12-29 LAB
ABO GROUP BLD: NORMAL
ALBUMIN SERPL-MCNC: 4 G/DL (ref 3.5–5.2)
ALP SERPL-CCNC: 99 U/L (ref 39–117)
ALT SERPL W P-5'-P-CCNC: 11 U/L (ref 1–41)
AMPHET+METHAMPHET UR QL: NEGATIVE
AMPHETAMINES UR QL: NEGATIVE
AST SERPL-CCNC: 15 U/L (ref 1–40)
BARBITURATES UR QL SCN: NEGATIVE
BENZODIAZ UR QL SCN: NEGATIVE
BILIRUB CONJ SERPL-MCNC: <0.2 MG/DL (ref 0–0.3)
BILIRUB INDIRECT SERPL-MCNC: NORMAL MG/DL
BILIRUB SERPL-MCNC: 0.3 MG/DL (ref 0–1.2)
BUPRENORPHINE SERPL-MCNC: NEGATIVE NG/ML
CANNABINOIDS SERPL QL: NEGATIVE
COCAINE UR QL: NEGATIVE
FENTANYL UR-MCNC: NEGATIVE NG/ML
HBA1C MFR BLD: 6.1 % (ref 4.8–5.6)
INR PPP: 1.13 (ref 0.89–1.12)
METHADONE UR QL SCN: NEGATIVE
OPIATES UR QL: NEGATIVE
OXYCODONE UR QL SCN: NEGATIVE
PCP UR QL SCN: NEGATIVE
PROT SERPL-MCNC: 7.9 G/DL (ref 6–8.5)
PROTHROMBIN TIME: 14.6 SECONDS (ref 12.2–14.5)
RH BLD: NEGATIVE
TRICYCLICS UR QL SCN: NEGATIVE

## 2023-12-29 PROCEDURE — 86901 BLOOD TYPING SEROLOGIC RH(D): CPT

## 2023-12-29 PROCEDURE — 36415 COLL VENOUS BLD VENIPUNCTURE: CPT

## 2023-12-29 PROCEDURE — 85610 PROTHROMBIN TIME: CPT

## 2023-12-29 PROCEDURE — 94010 BREATHING CAPACITY TEST: CPT

## 2023-12-29 PROCEDURE — 80305 DRUG TEST PRSMV DIR OPT OBS: CPT

## 2023-12-29 PROCEDURE — 71046 X-RAY EXAM CHEST 2 VIEWS: CPT

## 2023-12-29 PROCEDURE — 86900 BLOOD TYPING SEROLOGIC ABO: CPT

## 2023-12-29 PROCEDURE — 80307 DRUG TEST PRSMV CHEM ANLYZR: CPT

## 2023-12-29 PROCEDURE — 83036 HEMOGLOBIN GLYCOSYLATED A1C: CPT

## 2023-12-29 PROCEDURE — 80076 HEPATIC FUNCTION PANEL: CPT

## 2023-12-29 NOTE — PAT
Patient to apply Chlorhexadine wipes  to surgical area (as instructed) the night before procedure and the AM of procedure. Wipes provided.   Per Anesthesia Request, patient instructed not to take their ACE/ARB medications on the AM of surgery.   Bactroban to be applied to each nostril during PAT visit if surgery the following day.  If surgery NOT the following day, then Bactroban supplied to patient with instructions both written and verbally to insert into each nares the night before surgery.   Patient directed to Radiology Department for CXR after Pre Admission Testing Appointment.    Patient directed to Respiratory Department after Pre Admission Testing visit for Pulmonary Function Test.

## 2023-12-30 LAB
COTININE UR QL SCN: POSITIVE NG/ML
Lab: ABNORMAL

## 2024-01-08 ENCOUNTER — OFFICE VISIT (OUTPATIENT)
Dept: CARDIAC SURGERY | Facility: CLINIC | Age: 74
End: 2024-01-08
Payer: MEDICARE

## 2024-01-08 VITALS
OXYGEN SATURATION: 98 % | SYSTOLIC BLOOD PRESSURE: 119 MMHG | TEMPERATURE: 97.9 F | WEIGHT: 195.8 LBS | HEART RATE: 69 BPM | BODY MASS INDEX: 26.56 KG/M2 | DIASTOLIC BLOOD PRESSURE: 62 MMHG

## 2024-01-08 DIAGNOSIS — C34.31 MALIGNANT NEOPLASM OF LOWER LOBE OF RIGHT LUNG: Primary | ICD-10-CM

## 2024-01-08 PROCEDURE — 99214 OFFICE O/P EST MOD 30 MIN: CPT | Performed by: THORACIC SURGERY (CARDIOTHORACIC VASCULAR SURGERY)

## 2024-01-08 NOTE — H&P (VIEW-ONLY)
01/08/2024  Patient Information  David Barfield                                                                                          [address]  [place] [address]   1950  'PCP/Referring Physician'  Amanda Smith PA  [phone]  No ref. provider found    Chief Complaint   Patient presents with    Lung Cancer     Follow-up to discuss surgical plans for right lung cancer.        History of Present Illness:   The patient is a 73-year-old male every day smoker who I had seen a number of months ago with a right sided lung malignancy.  He had arranged to undergo chemotherapy with consideration of surgical resection following that.  He has now undergone his chemotherapy. His PET scan demonstrates the nodule is smaller and has responded but is still hypermetabolic. There is also questionable hypermetabolic activity with some surrounding lymph nodes.  His echocardiogram demonstrates near normal ventricular function and pulmonary function tests are adequate for right lower lobe resection. The patient is here with family and they have multiple questions regarding potential upcoming surgery.      Patient Active Problem List   Diagnosis    High degree atrioventricular block    Bradycardia, sinus    Chronic hypotension    PVC's (premature ventricular contractions)    Presence of cardiac pacemaker    Mixed hyperlipidemia    COPD (chronic obstructive pulmonary disease)    Nodule of lower lobe of right lung    Mediastinal adenopathy    Cough    History of tobacco use, presenting hazards to health    Bronchogenic cancer of right lung    Malignant neoplasm of lower lobe of right lung     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    Diabetes mellitus Borderline    Emphysema/COPD     Erectile disorder     GERD (gastroesophageal reflux disease)     History of chemotherapy     Hyperlipidemia     Hypertension  2019    Lung nodule     Mumps     Mumps     Pruritus     after bath    Slow to wake up after anesthesia     Wears dentures     upper only    Wears hearing aid in both ears     usually only wears right     Past Surgical History:   Procedure Laterality Date    BONE BIOPSY      broken bone surgery in his face    BRONCHOSCOPY WITH ION ROBOTIC ASSIST N/A 09/15/2023    Procedure: BRONCHOSCOPY NAVIGATION WITH ENDOBRONCHIAL ULTRASOUND AND ION ROBOT;  Surgeon: Octaviano Sampson MD;  Location:  [facility];  Service: Robotics - Pulmonary;  Laterality: N/A;  ion #6 - [id number]  - 0015  Cath guide 0061    EBUS balloon removed and intact    CARDIAC ELECTROPHYSIOLOGY PROCEDURE N/A 08/17/2021    Procedure: Pacemaker DC new;  Surgeon: Kayy Box MD;  Location:  [facility];  Service: Cardiology;  Laterality: N/A;    FACIAL FRACTURE SURGERY      PACEMAKER IMPLANTATION         Current Outpatient Medications:     albuterol sulfate  (90 Base) MCG/ACT inhaler, Inhale 2 puffs Every 4 (Four) Hours As Needed for Wheezing., Disp: 18 g, Rfl: 11    fluticasone (VERAMYST) 27.5 MCG/SPRAY nasal spray, 2 sprays into the nostril(s) as directed by provider 1 (One) Time As Needed for Rhinitis or Allergies., Disp: 6 mL, Rfl: 2    Fluticasone-Umeclidin-Vilant (Trelegy Ellipta) 100-62.5-25 MCG/ACT inhaler, Inhale 1 puff Daily., Disp: 3 each, Rfl: 3    lidocaine-prilocaine (EMLA) 2.5-2.5 % cream, Apply 1 application  topically to the appropriate area as directed As Needed (45-60 minutes prior to port access.  Cover with saran/plastic wrap.)., Disp: 30 g, Rfl: 3    lisinopril (PRINIVIL,ZESTRIL) 2.5 MG tablet, Take 1 tablet by mouth As Needed (elevated blood pressure). Take 1/2 tablet po prn (Patient taking differently: Take 1 tablet by mouth As Needed (elevated blood pressure systolic over 140). Take 1/2 tablet po prn), Disp: 30 tablet, Rfl: 1    omeprazole (priLOSEC) 40 MG capsule, Take 1 capsule by mouth Daily.,  Disp: 30 capsule, Rfl: 5    ondansetron (ZOFRAN) 8 MG tablet, Take 1 tablet by mouth 3 (Three) Times a Day As Needed for Nausea or Vomiting., Disp: 30 tablet, Rfl: 2    pravastatin (PRAVACHOL) 80 MG tablet, Take 1 tablet by mouth Every Night., Disp: 30 tablet, Rfl: 11    sildenafil (VIAGRA) 100 MG tablet, Take 0.5 tablets by mouth Daily As Needed for Erectile Dysfunction., Disp: , Rfl:   Allergies   Allergen Reactions    Latex Other (See Comments)     Latex allergy     Tape Rash     Social History     Socioeconomic History    Marital status: Significant Other    Number of children: 3   Tobacco Use    Smoking status: Every Day     Packs/day: 1.00     Years: 53.00     Additional pack years: 0.00     Total pack years: 53.00     Types: Cigarettes     Start date: 1/1/1968    Smokeless tobacco: Never    Tobacco comments:     Still smoke about 1 ppd   Vaping Use    Vaping Use: Never used   Substance and Sexual Activity    Alcohol use: Never    Drug use: Never    Sexual activity: Defer     Partners: Female     Birth control/protection: None     Family History   Problem Relation Age of Onset    Aneurysm Mother         brain    Dementia Father     Leukemia Sister     Hypertension Paternal Grandfather     Heart disease Paternal Grandmother      Review of Systems   Constitutional: Negative for chills, decreased appetite, diaphoresis, fever, malaise/fatigue, night sweats, weight gain and weight loss.   HENT:  Negative for hoarse voice.    Eyes:  Negative for blurred vision, double vision and visual disturbance.   Cardiovascular:  Positive for dyspnea on exertion. Negative for chest pain, claudication, irregular heartbeat, leg swelling, near-syncope, orthopnea, palpitations, paroxysmal nocturnal dyspnea and syncope.   Respiratory:  Positive for cough. Negative for hemoptysis, shortness of breath, sputum production and wheezing.    Hematologic/Lymphatic: Negative for adenopathy and bleeding problem. Does not bruise/bleed easily.    Skin:  Negative for color change, nail changes, poor wound healing and rash.   Musculoskeletal:  Negative for back pain, falls and muscle cramps.   Gastrointestinal:  Positive for abdominal pain. Negative for dysphagia and heartburn.   Genitourinary:  Negative for flank pain.   Neurological:  Positive for dizziness. Negative for brief paralysis, disturbances in coordination, focal weakness, headaches, light-headedness, loss of balance, numbness, paresthesias, sensory change, vertigo and weakness.   Psychiatric/Behavioral:  Negative for depression and suicidal ideas.    Allergic/Immunologic: Negative for persistent infections.     Vitals:    01/08/24 0652   BP: 119/62   BP Location: Right arm   Patient Position: Sitting   Pulse: 69   Temp: 97.9 °F (36.6 °C)   SpO2: 98%   Weight: 88.8 kg (195 lb 12.8 oz)      Physical Exam   CONSTITUTIONAL: Alert and conversant, Well dressed, Well nourished, No acute distress  EYES: Sclera clean, Anicteric, Pupils equal  ENT: No nasal deviation, Trachea midline  NECK: No neck masses, Supple  LUNGS: No wheezing, Cough, non-congested  HEART: No rubs, No murmurs  GASTROINTESTINAL: Soft, non-distended, No masses, Non tender  to palpation, normal bowel sounds  NEURO: No motor deficits, No sensory deficits, Cranial Nerves 2 through 12 grossly intact  PSYCHIATRIC: Oriented to person, place and time, No memory deficits, Mood appropriate  VASCULAR: No carotid bruits, Femoral pulses palpable and symmetric  MUSKULOSKELETAL: No extremity trauma or extremity asymmetry    The ROS, past medical history, surgical history, family history, social history, and vitals were reviewed by myself and corrected as needed.      Labs/Imaging:  I have reviewed the PET scan images. The patient is still continuing to smoke despite chemotherapy and discussions about cessation and the consequences thereof. The patient has an advance directive one file.     Assessment/Plan:   The patient is a 73-year-old male every  day smoker who has right lower lobe lung cancer that was biopsy-proven and has undergone chemotherapy.  He has had some response to this with the smaller nodule but it is still hypermetabolic.  He is agreeable to surgical resection and no guarantees at all have been made as to cure.  His pulmonary function tests are adequate and his left ventricular function is near normal.  I explained the hospital stay could be 5, 6 or 7 days, possibly even 8 days if there is a prolonged air leak.  He is aware there is a risk of bleeding, infection, death and prolonged air leak and again no guarantees as to cure have been made.    Patient Active Problem List   Diagnosis    High degree atrioventricular block    Bradycardia, sinus    Chronic hypotension    PVC's (premature ventricular contractions)    Presence of cardiac pacemaker    Mixed hyperlipidemia    COPD (chronic obstructive pulmonary disease)    Nodule of lower lobe of right lung    Mediastinal adenopathy    Cough    History of tobacco use, presenting hazards to health    Bronchogenic cancer of right lung    Malignant neoplasm of lower lobe of right lung       CC: THIEN Ogden editing for Joey Patel MD     I, Joey Patel MD, have read and agree with the editing done by Jacy Azar, .

## 2024-01-08 NOTE — PROGRESS NOTES
01/08/2024  Patient Information  David Barfield                                                                                          [address]  [place] [address]   1950  'PCP/Referring Physician'  Amanda Smith PA  [phone]  No ref. provider found    Chief Complaint   Patient presents with    Lung Cancer     Follow-up to discuss surgical plans for right lung cancer.        History of Present Illness:   The patient is a 73-year-old male every day smoker who I had seen a number of months ago with a right sided lung malignancy.  He had arranged to undergo chemotherapy with consideration of surgical resection following that.  He has now undergone his chemotherapy. His PET scan demonstrates the nodule is smaller and has responded but is still hypermetabolic. There is also questionable hypermetabolic activity with some surrounding lymph nodes.  His echocardiogram demonstrates near normal ventricular function and pulmonary function tests are adequate for right lower lobe resection. The patient is here with family and they have multiple questions regarding potential upcoming surgery.      Patient Active Problem List   Diagnosis    High degree atrioventricular block    Bradycardia, sinus    Chronic hypotension    PVC's (premature ventricular contractions)    Presence of cardiac pacemaker    Mixed hyperlipidemia    COPD (chronic obstructive pulmonary disease)    Nodule of lower lobe of right lung    Mediastinal adenopathy    Cough    History of tobacco use, presenting hazards to health    Bronchogenic cancer of right lung    Malignant neoplasm of lower lobe of right lung     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    Diabetes mellitus Borderline    Emphysema/COPD     Erectile disorder     GERD (gastroesophageal reflux disease)     History of chemotherapy     Hyperlipidemia     Hypertension  2019    Lung nodule     Mumps     Mumps     Pruritus     after bath    Slow to wake up after anesthesia     Wears dentures     upper only    Wears hearing aid in both ears     usually only wears right     Past Surgical History:   Procedure Laterality Date    BONE BIOPSY      broken bone surgery in his face    BRONCHOSCOPY WITH ION ROBOTIC ASSIST N/A 09/15/2023    Procedure: BRONCHOSCOPY NAVIGATION WITH ENDOBRONCHIAL ULTRASOUND AND ION ROBOT;  Surgeon: Octaviano Sampson MD;  Location:  [facility];  Service: Robotics - Pulmonary;  Laterality: N/A;  ion #[id number] - [id number]  - 0015  Cath guide 0061    EBUS balloon removed and intact    CARDIAC ELECTROPHYSIOLOGY PROCEDURE N/A 08/17/2021    Procedure: Pacemaker DC new;  Surgeon: Kayy Box MD;  Location:  [facility];  Service: Cardiology;  Laterality: N/A;    FACIAL FRACTURE SURGERY      PACEMAKER IMPLANTATION         Current Outpatient Medications:     albuterol sulfate  (90 Base) MCG/ACT inhaler, Inhale 2 puffs Every 4 (Four) Hours As Needed for Wheezing., Disp: 18 g, Rfl: 11    fluticasone (VERAMYST) 27.5 MCG/SPRAY nasal spray, 2 sprays into the nostril(s) as directed by provider 1 (One) Time As Needed for Rhinitis or Allergies., Disp: 6 mL, Rfl: 2    Fluticasone-Umeclidin-Vilant (Trelegy Ellipta) 100-62.5-25 MCG/ACT inhaler, Inhale 1 puff Daily., Disp: 3 each, Rfl: 3    lidocaine-prilocaine (EMLA) 2.5-2.5 % cream, Apply 1 application  topically to the appropriate area as directed As Needed (45-60 minutes prior to port access.  Cover with saran/plastic wrap.)., Disp: 30 g, Rfl: 3    lisinopril (PRINIVIL,ZESTRIL) 2.5 MG tablet, Take 1 tablet by mouth As Needed (elevated blood pressure). Take 1/2 tablet po prn (Patient taking differently: Take 1 tablet by mouth As Needed (elevated blood pressure systolic over 140). Take 1/2 tablet po prn), Disp: 30 tablet, Rfl: 1    omeprazole (priLOSEC) 40 MG capsule, Take 1 capsule by mouth Daily.,  Disp: 30 capsule, Rfl: 5    ondansetron (ZOFRAN) 8 MG tablet, Take 1 tablet by mouth 3 (Three) Times a Day As Needed for Nausea or Vomiting., Disp: 30 tablet, Rfl: 2    pravastatin (PRAVACHOL) 80 MG tablet, Take 1 tablet by mouth Every Night., Disp: 30 tablet, Rfl: 11    sildenafil (VIAGRA) 100 MG tablet, Take 0.5 tablets by mouth Daily As Needed for Erectile Dysfunction., Disp: , Rfl:   Allergies   Allergen Reactions    Latex Other (See Comments)     Latex allergy     Tape Rash     Social History     Socioeconomic History    Marital status: Significant Other    Number of children: 3   Tobacco Use    Smoking status: Every Day     Packs/day: 1.00     Years: 53.00     Additional pack years: 0.00     Total pack years: 53.00     Types: Cigarettes     Start date: 1/1/1968    Smokeless tobacco: Never    Tobacco comments:     Still smoke about 1 ppd   Vaping Use    Vaping Use: Never used   Substance and Sexual Activity    Alcohol use: Never    Drug use: Never    Sexual activity: Defer     Partners: Female     Birth control/protection: None     Family History   Problem Relation Age of Onset    Aneurysm Mother         brain    Dementia Father     Leukemia Sister     Hypertension Paternal Grandfather     Heart disease Paternal Grandmother      Review of Systems   Constitutional: Negative for chills, decreased appetite, diaphoresis, fever, malaise/fatigue, night sweats, weight gain and weight loss.   HENT:  Negative for hoarse voice.    Eyes:  Negative for blurred vision, double vision and visual disturbance.   Cardiovascular:  Positive for dyspnea on exertion. Negative for chest pain, claudication, irregular heartbeat, leg swelling, near-syncope, orthopnea, palpitations, paroxysmal nocturnal dyspnea and syncope.   Respiratory:  Positive for cough. Negative for hemoptysis, shortness of breath, sputum production and wheezing.    Hematologic/Lymphatic: Negative for adenopathy and bleeding problem. Does not bruise/bleed easily.    Skin:  Negative for color change, nail changes, poor wound healing and rash.   Musculoskeletal:  Negative for back pain, falls and muscle cramps.   Gastrointestinal:  Positive for abdominal pain. Negative for dysphagia and heartburn.   Genitourinary:  Negative for flank pain.   Neurological:  Positive for dizziness. Negative for brief paralysis, disturbances in coordination, focal weakness, headaches, light-headedness, loss of balance, numbness, paresthesias, sensory change, vertigo and weakness.   Psychiatric/Behavioral:  Negative for depression and suicidal ideas.    Allergic/Immunologic: Negative for persistent infections.     Vitals:    01/08/24 0652   BP: 119/62   BP Location: Right arm   Patient Position: Sitting   Pulse: 69   Temp: 97.9 °F (36.6 °C)   SpO2: 98%   Weight: 88.8 kg (195 lb 12.8 oz)      Physical Exam   CONSTITUTIONAL: Alert and conversant, Well dressed, Well nourished, No acute distress  EYES: Sclera clean, Anicteric, Pupils equal  ENT: No nasal deviation, Trachea midline  NECK: No neck masses, Supple  LUNGS: No wheezing, Cough, non-congested  HEART: No rubs, No murmurs  GASTROINTESTINAL: Soft, non-distended, No masses, Non tender  to palpation, normal bowel sounds  NEURO: No motor deficits, No sensory deficits, Cranial Nerves 2 through 12 grossly intact  PSYCHIATRIC: Oriented to person, place and time, No memory deficits, Mood appropriate  VASCULAR: No carotid bruits, Femoral pulses palpable and symmetric  MUSKULOSKELETAL: No extremity trauma or extremity asymmetry    The ROS, past medical history, surgical history, family history, social history, and vitals were reviewed by myself and corrected as needed.      Labs/Imaging:  I have reviewed the PET scan images. The patient is still continuing to smoke despite chemotherapy and discussions about cessation and the consequences thereof. The patient has an advance directive one file.     Assessment/Plan:   The patient is a 73-year-old male every  day smoker who has right lower lobe lung cancer that was biopsy-proven and has undergone chemotherapy.  He has had some response to this with the smaller nodule but it is still hypermetabolic.  He is agreeable to surgical resection and no guarantees at all have been made as to cure.  His pulmonary function tests are adequate and his left ventricular function is near normal.  I explained the hospital stay could be 5, 6 or 7 days, possibly even 8 days if there is a prolonged air leak.  He is aware there is a risk of bleeding, infection, death and prolonged air leak and again no guarantees as to cure have been made.    Patient Active Problem List   Diagnosis    High degree atrioventricular block    Bradycardia, sinus    Chronic hypotension    PVC's (premature ventricular contractions)    Presence of cardiac pacemaker    Mixed hyperlipidemia    COPD (chronic obstructive pulmonary disease)    Nodule of lower lobe of right lung    Mediastinal adenopathy    Cough    History of tobacco use, presenting hazards to health    Bronchogenic cancer of right lung    Malignant neoplasm of lower lobe of right lung       CC: THIEN Ogden editing for Joey Patel MD     I, Joey Patel MD, have read and agree with the editing done by Jacy Azar, .

## 2024-01-09 ENCOUNTER — APPOINTMENT (OUTPATIENT)
Dept: GENERAL RADIOLOGY | Facility: HOSPITAL | Age: 74
End: 2024-01-09
Payer: MEDICARE

## 2024-01-09 ENCOUNTER — HOSPITAL ENCOUNTER (INPATIENT)
Facility: HOSPITAL | Age: 74
LOS: 6 days | Discharge: HOME | End: 2024-01-15
Attending: THORACIC SURGERY (CARDIOTHORACIC VASCULAR SURGERY) | Admitting: THORACIC SURGERY (CARDIOTHORACIC VASCULAR SURGERY)
Payer: MEDICARE

## 2024-01-09 ENCOUNTER — ANESTHESIA (OUTPATIENT)
Dept: PERIOP | Facility: HOSPITAL | Age: 74
End: 2024-01-09
Payer: MEDICARE

## 2024-01-09 ENCOUNTER — ANESTHESIA EVENT (OUTPATIENT)
Dept: PERIOP | Facility: HOSPITAL | Age: 74
End: 2024-01-09
Payer: MEDICARE

## 2024-01-09 DIAGNOSIS — R91.1 NODULE OF LOWER LOBE OF RIGHT LUNG: ICD-10-CM

## 2024-01-09 DIAGNOSIS — C34.91 BRONCHOGENIC CANCER OF RIGHT LUNG: ICD-10-CM

## 2024-01-09 PROBLEM — C34.90 LUNG CANCER: Status: ACTIVE | Noted: 2024-01-09

## 2024-01-09 PROBLEM — C34.90 LUNG CANCER: Status: RESOLVED | Noted: 2024-01-09 | Resolved: 2024-01-09

## 2024-01-09 LAB
ABO GROUP BLD: NORMAL
BLD GP AB SCN SERPL QL: NEGATIVE
GLUCOSE BLDC GLUCOMTR-MCNC: 104 MG/DL (ref 70–130)
GLUCOSE BLDC GLUCOMTR-MCNC: 104 MG/DL (ref 70–130)
RH BLD: NEGATIVE
T&S EXPIRATION DATE: NORMAL

## 2024-01-09 PROCEDURE — 88341 IMHCHEM/IMCYTCHM EA ADD ANTB: CPT | Performed by: THORACIC SURGERY (CARDIOTHORACIC VASCULAR SURGERY)

## 2024-01-09 PROCEDURE — 71045 X-RAY EXAM CHEST 1 VIEW: CPT

## 2024-01-09 PROCEDURE — 86850 RBC ANTIBODY SCREEN: CPT | Performed by: THORACIC SURGERY (CARDIOTHORACIC VASCULAR SURGERY)

## 2024-01-09 PROCEDURE — 25010000002 FENTANYL CITRATE (PF) 100 MCG/2ML SOLUTION: Performed by: NURSE ANESTHETIST, CERTIFIED REGISTERED

## 2024-01-09 PROCEDURE — 25010000002 BUPIVACAINE (PF) 0.25 % SOLUTION: Performed by: NURSE ANESTHETIST, CERTIFIED REGISTERED

## 2024-01-09 PROCEDURE — 25010000002 MORPHINE PER 10 MG: Performed by: NURSE ANESTHETIST, CERTIFIED REGISTERED

## 2024-01-09 PROCEDURE — 25010000002 SUGAMMADEX 200 MG/2ML SOLUTION: Performed by: NURSE ANESTHETIST, CERTIFIED REGISTERED

## 2024-01-09 PROCEDURE — 99232 SBSQ HOSP IP/OBS MODERATE 35: CPT | Performed by: INTERNAL MEDICINE

## 2024-01-09 PROCEDURE — 94799 UNLISTED PULMONARY SVC/PX: CPT

## 2024-01-09 PROCEDURE — 86901 BLOOD TYPING SEROLOGIC RH(D): CPT | Performed by: THORACIC SURGERY (CARDIOTHORACIC VASCULAR SURGERY)

## 2024-01-09 PROCEDURE — 25810000003 SODIUM CHLORIDE 0.9 % SOLUTION 250 ML FLEX CONT: Performed by: PHYSICIAN ASSISTANT

## 2024-01-09 PROCEDURE — 32480 PARTIAL REMOVAL OF LUNG: CPT | Performed by: PHYSICIAN ASSISTANT

## 2024-01-09 PROCEDURE — 25010000002 GLYCOPYRROLATE 0.4 MG/2ML SOLUTION: Performed by: NURSE ANESTHETIST, CERTIFIED REGISTERED

## 2024-01-09 PROCEDURE — C1729 CATH, DRAINAGE: HCPCS | Performed by: THORACIC SURGERY (CARDIOTHORACIC VASCULAR SURGERY)

## 2024-01-09 PROCEDURE — 25810000003 SODIUM CHLORIDE 0.9 % SOLUTION 250 ML FLEX CONT: Performed by: ANESTHESIOLOGY

## 2024-01-09 PROCEDURE — 25010000002 DEXAMETHASONE PER 1 MG: Performed by: NURSE ANESTHETIST, CERTIFIED REGISTERED

## 2024-01-09 PROCEDURE — P9612 CATHETERIZE FOR URINE SPEC: HCPCS

## 2024-01-09 PROCEDURE — 25010000002 NICARDIPINE 2.5 MG/ML SOLUTION 10 ML VIAL: Performed by: PHYSICIAN ASSISTANT

## 2024-01-09 PROCEDURE — 25010000002 CEFAZOLIN PER 500 MG: Performed by: THORACIC SURGERY (CARDIOTHORACIC VASCULAR SURGERY)

## 2024-01-09 PROCEDURE — 32480 PARTIAL REMOVAL OF LUNG: CPT | Performed by: THORACIC SURGERY (CARDIOTHORACIC VASCULAR SURGERY)

## 2024-01-09 PROCEDURE — 86900 BLOOD TYPING SEROLOGIC ABO: CPT | Performed by: THORACIC SURGERY (CARDIOTHORACIC VASCULAR SURGERY)

## 2024-01-09 PROCEDURE — 25010000002 PHENYLEPHRINE 10 MG/ML SOLUTION: Performed by: NURSE ANESTHETIST, CERTIFIED REGISTERED

## 2024-01-09 PROCEDURE — 94640 AIRWAY INHALATION TREATMENT: CPT

## 2024-01-09 PROCEDURE — C1755 CATHETER, INTRASPINAL: HCPCS | Performed by: ANESTHESIOLOGY

## 2024-01-09 PROCEDURE — 25010000002 CEFAZOLIN PER 500 MG

## 2024-01-09 PROCEDURE — 0BJ08ZZ INSPECTION OF TRACHEOBRONCHIAL TREE, VIA NATURAL OR ARTIFICIAL OPENING ENDOSCOPIC: ICD-10-PCS | Performed by: THORACIC SURGERY (CARDIOTHORACIC VASCULAR SURGERY)

## 2024-01-09 PROCEDURE — 25010000002 ONDANSETRON PER 1 MG: Performed by: PHYSICIAN ASSISTANT

## 2024-01-09 PROCEDURE — 25010000002 PROPOFOL 10 MG/ML EMULSION: Performed by: NURSE ANESTHETIST, CERTIFIED REGISTERED

## 2024-01-09 PROCEDURE — 88305 TISSUE EXAM BY PATHOLOGIST: CPT | Performed by: THORACIC SURGERY (CARDIOTHORACIC VASCULAR SURGERY)

## 2024-01-09 PROCEDURE — 25010000002 KETOROLAC TROMETHAMINE PER 15 MG

## 2024-01-09 PROCEDURE — 38746 REMOVE THORACIC LYMPH NODES: CPT | Performed by: PHYSICIAN ASSISTANT

## 2024-01-09 PROCEDURE — 0BTF0ZZ RESECTION OF RIGHT LOWER LUNG LOBE, OPEN APPROACH: ICD-10-PCS | Performed by: THORACIC SURGERY (CARDIOTHORACIC VASCULAR SURGERY)

## 2024-01-09 PROCEDURE — 25810000003 LACTATED RINGERS PER 1000 ML: Performed by: ANESTHESIOLOGY

## 2024-01-09 PROCEDURE — 88331 PATH CONSLTJ SURG 1 BLK 1SPC: CPT | Performed by: THORACIC SURGERY (CARDIOTHORACIC VASCULAR SURGERY)

## 2024-01-09 PROCEDURE — 86923 COMPATIBILITY TEST ELECTRIC: CPT

## 2024-01-09 PROCEDURE — 25010000002 GENTAMICIN PER 80 MG: Performed by: THORACIC SURGERY (CARDIOTHORACIC VASCULAR SURGERY)

## 2024-01-09 PROCEDURE — C2615 SEALANT, PULMONARY, LIQUID: HCPCS | Performed by: THORACIC SURGERY (CARDIOTHORACIC VASCULAR SURGERY)

## 2024-01-09 PROCEDURE — 25810000003 LACTATED RINGERS PER 1000 ML: Performed by: NURSE ANESTHETIST, CERTIFIED REGISTERED

## 2024-01-09 PROCEDURE — 88309 TISSUE EXAM BY PATHOLOGIST: CPT | Performed by: THORACIC SURGERY (CARDIOTHORACIC VASCULAR SURGERY)

## 2024-01-09 PROCEDURE — 25810000003 SODIUM CHLORIDE 0.9 % SOLUTION: Performed by: PHYSICIAN ASSISTANT

## 2024-01-09 PROCEDURE — 25010000002 ONDANSETRON PER 1 MG: Performed by: NURSE ANESTHETIST, CERTIFIED REGISTERED

## 2024-01-09 PROCEDURE — 25010000002 BUPIVACAINE (PF) 0.5 % SOLUTION 30 ML VIAL: Performed by: ANESTHESIOLOGY

## 2024-01-09 PROCEDURE — 38746 REMOVE THORACIC LYMPH NODES: CPT | Performed by: THORACIC SURGERY (CARDIOTHORACIC VASCULAR SURGERY)

## 2024-01-09 PROCEDURE — 82948 REAGENT STRIP/BLOOD GLUCOSE: CPT

## 2024-01-09 PROCEDURE — 88342 IMHCHEM/IMCYTCHM 1ST ANTB: CPT | Performed by: THORACIC SURGERY (CARDIOTHORACIC VASCULAR SURGERY)

## 2024-01-09 PROCEDURE — 07B70ZX EXCISION OF THORAX LYMPHATIC, OPEN APPROACH, DIAGNOSTIC: ICD-10-PCS | Performed by: THORACIC SURGERY (CARDIOTHORACIC VASCULAR SURGERY)

## 2024-01-09 PROCEDURE — 25010000002 CEFAZOLIN PER 500 MG: Performed by: PHYSICIAN ASSISTANT

## 2024-01-09 PROCEDURE — 25010000002 PHENYLEPHRINE 10 MG/ML SOLUTION 1 ML VIAL: Performed by: NURSE ANESTHETIST, CERTIFIED REGISTERED

## 2024-01-09 PROCEDURE — 25010000002 MORPHINE PER 10 MG: Performed by: ANESTHESIOLOGY

## 2024-01-09 DEVICE — PROXIMATE RELOADABLE LINEAR STAPLER, 30MM
Type: IMPLANTABLE DEVICE | Site: LUNG | Status: FUNCTIONAL
Brand: PROXIMATE

## 2024-01-09 DEVICE — PROXIMATE VASCULAR LINEAR STAPLER RELOADS
Type: IMPLANTABLE DEVICE | Site: LUNG | Status: FUNCTIONAL
Brand: PROXIMATE

## 2024-01-09 DEVICE — ENDOPATH ECHELON ENDOSCOPIC LINEAR CUTTER RELOADS, GREEN, 60MM
Type: IMPLANTABLE DEVICE | Site: LUNG | Status: FUNCTIONAL
Brand: ECHELON ENDOPATH

## 2024-01-09 DEVICE — PROGEL, PLEURAL AIR LEAK SEALANT, 4 ML KIT
Type: IMPLANTABLE DEVICE | Site: LUNG | Status: FUNCTIONAL
Brand: PROGEL

## 2024-01-09 DEVICE — ENDOPATH ECHELON VASCULAR  RELOADS, WHITE, 35MM
Type: IMPLANTABLE DEVICE | Site: LUNG | Status: FUNCTIONAL
Brand: ECHELON ENDOPATH

## 2024-01-09 RX ORDER — ONDANSETRON 2 MG/ML
4 INJECTION INTRAMUSCULAR; INTRAVENOUS ONCE AS NEEDED
Status: DISCONTINUED | OUTPATIENT
Start: 2024-01-09 | End: 2024-01-09

## 2024-01-09 RX ORDER — HYDRALAZINE HYDROCHLORIDE 20 MG/ML
5 INJECTION INTRAMUSCULAR; INTRAVENOUS
Status: DISCONTINUED | OUTPATIENT
Start: 2024-01-09 | End: 2024-01-09

## 2024-01-09 RX ORDER — FAMOTIDINE 10 MG/ML
20 INJECTION, SOLUTION INTRAVENOUS ONCE
Status: DISCONTINUED | OUTPATIENT
Start: 2024-01-09 | End: 2024-01-09 | Stop reason: HOSPADM

## 2024-01-09 RX ORDER — SODIUM CHLORIDE 0.9 % (FLUSH) 0.9 %
3 SYRINGE (ML) INJECTION EVERY 12 HOURS SCHEDULED
Status: DISCONTINUED | OUTPATIENT
Start: 2024-01-09 | End: 2024-01-09 | Stop reason: HOSPADM

## 2024-01-09 RX ORDER — OXYCODONE HYDROCHLORIDE 5 MG/1
5 TABLET ORAL EVERY 4 HOURS PRN
Status: DISCONTINUED | OUTPATIENT
Start: 2024-01-09 | End: 2024-01-15 | Stop reason: HOSPADM

## 2024-01-09 RX ORDER — ONDANSETRON 4 MG/1
4 TABLET, ORALLY DISINTEGRATING ORAL EVERY 6 HOURS PRN
Status: DISCONTINUED | OUTPATIENT
Start: 2024-01-09 | End: 2024-01-15 | Stop reason: HOSPADM

## 2024-01-09 RX ORDER — SODIUM CHLORIDE 9 MG/ML
40 INJECTION, SOLUTION INTRAVENOUS AS NEEDED
Status: DISCONTINUED | OUTPATIENT
Start: 2024-01-09 | End: 2024-01-09

## 2024-01-09 RX ORDER — GLYCOPYRROLATE 0.2 MG/ML
INJECTION INTRAMUSCULAR; INTRAVENOUS AS NEEDED
Status: DISCONTINUED | OUTPATIENT
Start: 2024-01-09 | End: 2024-01-09 | Stop reason: SURG

## 2024-01-09 RX ORDER — SODIUM CHLORIDE, SODIUM LACTATE, POTASSIUM CHLORIDE, CALCIUM CHLORIDE 600; 310; 30; 20 MG/100ML; MG/100ML; MG/100ML; MG/100ML
INJECTION, SOLUTION INTRAVENOUS CONTINUOUS PRN
Status: DISCONTINUED | OUTPATIENT
Start: 2024-01-09 | End: 2024-01-09 | Stop reason: SURG

## 2024-01-09 RX ORDER — MIDAZOLAM HYDROCHLORIDE 1 MG/ML
0.5 INJECTION INTRAMUSCULAR; INTRAVENOUS
Status: DISCONTINUED | OUTPATIENT
Start: 2024-01-09 | End: 2024-01-09 | Stop reason: HOSPADM

## 2024-01-09 RX ORDER — POLYETHYLENE GLYCOL 3350 17 G/17G
17 POWDER, FOR SOLUTION ORAL DAILY
Status: DISCONTINUED | OUTPATIENT
Start: 2024-01-09 | End: 2024-01-15 | Stop reason: HOSPADM

## 2024-01-09 RX ORDER — IPRATROPIUM BROMIDE AND ALBUTEROL SULFATE 2.5; .5 MG/3ML; MG/3ML
3 SOLUTION RESPIRATORY (INHALATION) ONCE AS NEEDED
Status: DISCONTINUED | OUTPATIENT
Start: 2024-01-09 | End: 2024-01-09

## 2024-01-09 RX ORDER — LIDOCAINE HYDROCHLORIDE 10 MG/ML
INJECTION, SOLUTION EPIDURAL; INFILTRATION; INTRACAUDAL; PERINEURAL AS NEEDED
Status: DISCONTINUED | OUTPATIENT
Start: 2024-01-09 | End: 2024-01-09 | Stop reason: SURG

## 2024-01-09 RX ORDER — PANTOPRAZOLE SODIUM 40 MG/1
40 TABLET, DELAYED RELEASE ORAL
Status: DISCONTINUED | OUTPATIENT
Start: 2024-01-10 | End: 2024-01-12

## 2024-01-09 RX ORDER — NALBUPHINE HYDROCHLORIDE 10 MG/ML
10 INJECTION, SOLUTION INTRAMUSCULAR; INTRAVENOUS; SUBCUTANEOUS
Status: DISCONTINUED | OUTPATIENT
Start: 2024-01-09 | End: 2024-01-15 | Stop reason: HOSPADM

## 2024-01-09 RX ORDER — LABETALOL HYDROCHLORIDE 5 MG/ML
5 INJECTION, SOLUTION INTRAVENOUS
Status: DISCONTINUED | OUTPATIENT
Start: 2024-01-09 | End: 2024-01-09 | Stop reason: HOSPADM

## 2024-01-09 RX ORDER — NALOXONE HCL 0.4 MG/ML
0.4 VIAL (ML) INJECTION AS NEEDED
Status: DISCONTINUED | OUTPATIENT
Start: 2024-01-09 | End: 2024-01-09 | Stop reason: HOSPADM

## 2024-01-09 RX ORDER — HEPARIN SODIUM 5000 [USP'U]/ML
5000 INJECTION, SOLUTION INTRAVENOUS; SUBCUTANEOUS EVERY 12 HOURS SCHEDULED
Status: DISCONTINUED | OUTPATIENT
Start: 2024-01-10 | End: 2024-01-15 | Stop reason: HOSPADM

## 2024-01-09 RX ORDER — HYDRALAZINE HYDROCHLORIDE 20 MG/ML
5 INJECTION INTRAMUSCULAR; INTRAVENOUS
Status: DISCONTINUED | OUTPATIENT
Start: 2024-01-09 | End: 2024-01-09 | Stop reason: HOSPADM

## 2024-01-09 RX ORDER — FENTANYL CITRATE 50 UG/ML
50 INJECTION, SOLUTION INTRAMUSCULAR; INTRAVENOUS
Status: DISCONTINUED | OUTPATIENT
Start: 2024-01-09 | End: 2024-01-09 | Stop reason: HOSPADM

## 2024-01-09 RX ORDER — DROPERIDOL 2.5 MG/ML
0.62 INJECTION, SOLUTION INTRAMUSCULAR; INTRAVENOUS
Status: DISCONTINUED | OUTPATIENT
Start: 2024-01-09 | End: 2024-01-09 | Stop reason: HOSPADM

## 2024-01-09 RX ORDER — PHENYLEPHRINE HYDROCHLORIDE 10 MG/ML
INJECTION INTRAVENOUS AS NEEDED
Status: DISCONTINUED | OUTPATIENT
Start: 2024-01-09 | End: 2024-01-09 | Stop reason: SURG

## 2024-01-09 RX ORDER — KETOROLAC TROMETHAMINE 15 MG/ML
15 INJECTION, SOLUTION INTRAMUSCULAR; INTRAVENOUS ONCE
Status: COMPLETED | OUTPATIENT
Start: 2024-01-09 | End: 2024-01-09

## 2024-01-09 RX ORDER — BUPIVACAINE HYDROCHLORIDE AND EPINEPHRINE 5; 5 MG/ML; UG/ML
INJECTION, SOLUTION EPIDURAL; INTRACAUDAL; PERINEURAL AS NEEDED
Status: DISCONTINUED | OUTPATIENT
Start: 2024-01-09 | End: 2024-01-09 | Stop reason: HOSPADM

## 2024-01-09 RX ORDER — AMOXICILLIN 250 MG
2 CAPSULE ORAL NIGHTLY
Status: DISCONTINUED | OUTPATIENT
Start: 2024-01-09 | End: 2024-01-13

## 2024-01-09 RX ORDER — SODIUM CHLORIDE 0.9 % (FLUSH) 0.9 %
3-10 SYRINGE (ML) INJECTION AS NEEDED
Status: DISCONTINUED | OUTPATIENT
Start: 2024-01-09 | End: 2024-01-09 | Stop reason: HOSPADM

## 2024-01-09 RX ORDER — HYDROCODONE BITARTRATE AND ACETAMINOPHEN 5; 325 MG/1; MG/1
1 TABLET ORAL ONCE AS NEEDED
Status: DISCONTINUED | OUTPATIENT
Start: 2024-01-09 | End: 2024-01-09

## 2024-01-09 RX ORDER — SODIUM CHLORIDE 9 MG/ML
40 INJECTION, SOLUTION INTRAVENOUS AS NEEDED
Status: DISCONTINUED | OUTPATIENT
Start: 2024-01-09 | End: 2024-01-09 | Stop reason: HOSPADM

## 2024-01-09 RX ORDER — MORPHINE SULFATE 10 MG/ML
INJECTION INTRAMUSCULAR; INTRAVENOUS; SUBCUTANEOUS AS NEEDED
Status: DISCONTINUED | OUTPATIENT
Start: 2024-01-09 | End: 2024-01-09 | Stop reason: SURG

## 2024-01-09 RX ORDER — PRAVASTATIN SODIUM 40 MG
80 TABLET ORAL NIGHTLY
Status: DISCONTINUED | OUTPATIENT
Start: 2024-01-09 | End: 2024-01-15 | Stop reason: HOSPADM

## 2024-01-09 RX ORDER — DROPERIDOL 2.5 MG/ML
0.62 INJECTION, SOLUTION INTRAMUSCULAR; INTRAVENOUS ONCE AS NEEDED
Status: DISCONTINUED | OUTPATIENT
Start: 2024-01-09 | End: 2024-01-09 | Stop reason: HOSPADM

## 2024-01-09 RX ORDER — NALOXONE HCL 0.4 MG/ML
0.4 VIAL (ML) INJECTION
Status: DISCONTINUED | OUTPATIENT
Start: 2024-01-09 | End: 2024-01-09

## 2024-01-09 RX ORDER — SODIUM CHLORIDE 9 MG/ML
30 INJECTION, SOLUTION INTRAVENOUS CONTINUOUS
Status: DISCONTINUED | OUTPATIENT
Start: 2024-01-09 | End: 2024-01-10

## 2024-01-09 RX ORDER — DEXAMETHASONE SODIUM PHOSPHATE 4 MG/ML
INJECTION, SOLUTION INTRA-ARTICULAR; INTRALESIONAL; INTRAMUSCULAR; INTRAVENOUS; SOFT TISSUE AS NEEDED
Status: DISCONTINUED | OUTPATIENT
Start: 2024-01-09 | End: 2024-01-09 | Stop reason: SURG

## 2024-01-09 RX ORDER — BUPIVACAINE HYDROCHLORIDE 2.5 MG/ML
INJECTION, SOLUTION EPIDURAL; INFILTRATION; INTRACAUDAL AS NEEDED
Status: DISCONTINUED | OUTPATIENT
Start: 2024-01-09 | End: 2024-01-09 | Stop reason: SURG

## 2024-01-09 RX ORDER — LIDOCAINE HYDROCHLORIDE 10 MG/ML
0.5 INJECTION, SOLUTION EPIDURAL; INFILTRATION; INTRACAUDAL; PERINEURAL ONCE AS NEEDED
Status: COMPLETED | OUTPATIENT
Start: 2024-01-09 | End: 2024-01-09

## 2024-01-09 RX ORDER — DROPERIDOL 2.5 MG/ML
0.62 INJECTION, SOLUTION INTRAMUSCULAR; INTRAVENOUS ONCE AS NEEDED
Status: DISCONTINUED | OUTPATIENT
Start: 2024-01-09 | End: 2024-01-09

## 2024-01-09 RX ORDER — ONDANSETRON 2 MG/ML
4 INJECTION INTRAMUSCULAR; INTRAVENOUS ONCE AS NEEDED
Status: DISCONTINUED | OUTPATIENT
Start: 2024-01-09 | End: 2024-01-09 | Stop reason: HOSPADM

## 2024-01-09 RX ORDER — LIDOCAINE HYDROCHLORIDE 10 MG/ML
0.5 INJECTION, SOLUTION EPIDURAL; INFILTRATION; INTRACAUDAL; PERINEURAL ONCE AS NEEDED
Status: DISCONTINUED | OUTPATIENT
Start: 2024-01-09 | End: 2024-01-09 | Stop reason: HOSPADM

## 2024-01-09 RX ORDER — HYDROMORPHONE HYDROCHLORIDE 1 MG/ML
0.5 INJECTION, SOLUTION INTRAMUSCULAR; INTRAVENOUS; SUBCUTANEOUS
Status: DISCONTINUED | OUTPATIENT
Start: 2024-01-09 | End: 2024-01-09 | Stop reason: HOSPADM

## 2024-01-09 RX ORDER — SODIUM CHLORIDE 0.9 % (FLUSH) 0.9 %
10 SYRINGE (ML) INJECTION AS NEEDED
Status: DISCONTINUED | OUTPATIENT
Start: 2024-01-09 | End: 2024-01-09 | Stop reason: HOSPADM

## 2024-01-09 RX ORDER — PROPOFOL 10 MG/ML
VIAL (ML) INTRAVENOUS AS NEEDED
Status: DISCONTINUED | OUTPATIENT
Start: 2024-01-09 | End: 2024-01-09 | Stop reason: SURG

## 2024-01-09 RX ORDER — PROMETHAZINE HYDROCHLORIDE 25 MG/1
25 TABLET ORAL ONCE AS NEEDED
Status: DISCONTINUED | OUTPATIENT
Start: 2024-01-09 | End: 2024-01-09 | Stop reason: HOSPADM

## 2024-01-09 RX ORDER — SODIUM CHLORIDE 0.9 % (FLUSH) 0.9 %
10 SYRINGE (ML) INJECTION EVERY 12 HOURS SCHEDULED
Status: DISCONTINUED | OUTPATIENT
Start: 2024-01-09 | End: 2024-01-09 | Stop reason: HOSPADM

## 2024-01-09 RX ORDER — IPRATROPIUM BROMIDE AND ALBUTEROL SULFATE 2.5; .5 MG/3ML; MG/3ML
3 SOLUTION RESPIRATORY (INHALATION) ONCE AS NEEDED
Status: DISCONTINUED | OUTPATIENT
Start: 2024-01-09 | End: 2024-01-09 | Stop reason: HOSPADM

## 2024-01-09 RX ORDER — NALOXONE HCL 0.4 MG/ML
0.4 VIAL (ML) INJECTION AS NEEDED
Status: DISCONTINUED | OUTPATIENT
Start: 2024-01-09 | End: 2024-01-09

## 2024-01-09 RX ORDER — LISINOPRIL 2.5 MG/1
2.5 TABLET ORAL AS NEEDED
Status: DISCONTINUED | OUTPATIENT
Start: 2024-01-09 | End: 2024-01-09

## 2024-01-09 RX ORDER — HYDROMORPHONE HYDROCHLORIDE 1 MG/ML
0.5 INJECTION, SOLUTION INTRAMUSCULAR; INTRAVENOUS; SUBCUTANEOUS
Status: DISCONTINUED | OUTPATIENT
Start: 2024-01-09 | End: 2024-01-09

## 2024-01-09 RX ORDER — KETOROLAC TROMETHAMINE 15 MG/ML
15 INJECTION, SOLUTION INTRAMUSCULAR; INTRAVENOUS EVERY 6 HOURS PRN
Status: DISPENSED | OUTPATIENT
Start: 2024-01-09 | End: 2024-01-13

## 2024-01-09 RX ORDER — PROMETHAZINE HYDROCHLORIDE 25 MG/1
25 SUPPOSITORY RECTAL ONCE AS NEEDED
Status: DISCONTINUED | OUTPATIENT
Start: 2024-01-09 | End: 2024-01-09 | Stop reason: HOSPADM

## 2024-01-09 RX ORDER — ONDANSETRON 2 MG/ML
4 INJECTION INTRAMUSCULAR; INTRAVENOUS EVERY 6 HOURS PRN
Status: DISCONTINUED | OUTPATIENT
Start: 2024-01-09 | End: 2024-01-15 | Stop reason: HOSPADM

## 2024-01-09 RX ORDER — FAMOTIDINE 20 MG/1
20 TABLET, FILM COATED ORAL
Status: DISCONTINUED | OUTPATIENT
Start: 2024-01-09 | End: 2024-01-09 | Stop reason: HOSPADM

## 2024-01-09 RX ORDER — FENTANYL CITRATE 50 UG/ML
INJECTION, SOLUTION INTRAMUSCULAR; INTRAVENOUS AS NEEDED
Status: DISCONTINUED | OUTPATIENT
Start: 2024-01-09 | End: 2024-01-09 | Stop reason: SURG

## 2024-01-09 RX ORDER — HYDROCODONE BITARTRATE AND ACETAMINOPHEN 5; 325 MG/1; MG/1
1 TABLET ORAL ONCE AS NEEDED
Status: DISCONTINUED | OUTPATIENT
Start: 2024-01-09 | End: 2024-01-09 | Stop reason: HOSPADM

## 2024-01-09 RX ORDER — BISACODYL 10 MG
10 SUPPOSITORY, RECTAL RECTAL DAILY PRN
Status: DISCONTINUED | OUTPATIENT
Start: 2024-01-09 | End: 2024-01-15 | Stop reason: HOSPADM

## 2024-01-09 RX ORDER — PROMETHAZINE HYDROCHLORIDE 25 MG/1
25 SUPPOSITORY RECTAL ONCE AS NEEDED
Status: DISCONTINUED | OUTPATIENT
Start: 2024-01-09 | End: 2024-01-09

## 2024-01-09 RX ORDER — ROCURONIUM BROMIDE 10 MG/ML
INJECTION, SOLUTION INTRAVENOUS AS NEEDED
Status: DISCONTINUED | OUTPATIENT
Start: 2024-01-09 | End: 2024-01-09 | Stop reason: SURG

## 2024-01-09 RX ORDER — BUDESONIDE AND FORMOTEROL FUMARATE DIHYDRATE 160; 4.5 UG/1; UG/1
2 AEROSOL RESPIRATORY (INHALATION)
Status: DISCONTINUED | OUTPATIENT
Start: 2024-01-09 | End: 2024-01-15 | Stop reason: HOSPADM

## 2024-01-09 RX ORDER — GABAPENTIN 100 MG/1
100 CAPSULE ORAL ONCE
Status: COMPLETED | OUTPATIENT
Start: 2024-01-09 | End: 2024-01-09

## 2024-01-09 RX ORDER — NITROGLYCERIN 0.4 MG/1
0.4 TABLET SUBLINGUAL
Status: DISCONTINUED | OUTPATIENT
Start: 2024-01-09 | End: 2024-01-15 | Stop reason: HOSPADM

## 2024-01-09 RX ORDER — MEPERIDINE HYDROCHLORIDE 25 MG/ML
12.5 INJECTION INTRAMUSCULAR; INTRAVENOUS; SUBCUTANEOUS
Status: DISCONTINUED | OUTPATIENT
Start: 2024-01-09 | End: 2024-01-09 | Stop reason: HOSPADM

## 2024-01-09 RX ORDER — MEPERIDINE HYDROCHLORIDE 25 MG/ML
12.5 INJECTION INTRAMUSCULAR; INTRAVENOUS; SUBCUTANEOUS
Status: DISCONTINUED | OUTPATIENT
Start: 2024-01-09 | End: 2024-01-09

## 2024-01-09 RX ORDER — PROMETHAZINE HYDROCHLORIDE 25 MG/1
25 TABLET ORAL ONCE AS NEEDED
Status: DISCONTINUED | OUTPATIENT
Start: 2024-01-09 | End: 2024-01-09

## 2024-01-09 RX ORDER — DROPERIDOL 2.5 MG/ML
0.62 INJECTION, SOLUTION INTRAMUSCULAR; INTRAVENOUS
Status: DISCONTINUED | OUTPATIENT
Start: 2024-01-09 | End: 2024-01-09

## 2024-01-09 RX ORDER — LISINOPRIL 5 MG/1
2.5 TABLET ORAL DAILY PRN
Status: DISCONTINUED | OUTPATIENT
Start: 2024-01-09 | End: 2024-01-15 | Stop reason: HOSPADM

## 2024-01-09 RX ORDER — FAMOTIDINE 20 MG/1
20 TABLET, FILM COATED ORAL ONCE
Status: COMPLETED | OUTPATIENT
Start: 2024-01-09 | End: 2024-01-09

## 2024-01-09 RX ORDER — ACETAMINOPHEN 500 MG
1000 TABLET ORAL EVERY 8 HOURS SCHEDULED
Status: DISCONTINUED | OUTPATIENT
Start: 2024-01-09 | End: 2024-01-15 | Stop reason: HOSPADM

## 2024-01-09 RX ORDER — GABAPENTIN 100 MG/1
100 CAPSULE ORAL 3 TIMES DAILY
Status: DISCONTINUED | OUTPATIENT
Start: 2024-01-09 | End: 2024-01-15 | Stop reason: HOSPADM

## 2024-01-09 RX ORDER — CHLORHEXIDINE GLUCONATE 500 MG/1
CLOTH TOPICAL EVERY 12 HOURS PRN
Status: DISCONTINUED | OUTPATIENT
Start: 2024-01-09 | End: 2024-01-09 | Stop reason: HOSPADM

## 2024-01-09 RX ORDER — LABETALOL HYDROCHLORIDE 5 MG/ML
5 INJECTION, SOLUTION INTRAVENOUS
Status: DISCONTINUED | OUTPATIENT
Start: 2024-01-09 | End: 2024-01-09

## 2024-01-09 RX ORDER — ONDANSETRON 2 MG/ML
INJECTION INTRAMUSCULAR; INTRAVENOUS AS NEEDED
Status: DISCONTINUED | OUTPATIENT
Start: 2024-01-09 | End: 2024-01-09 | Stop reason: SURG

## 2024-01-09 RX ORDER — ALBUTEROL SULFATE 2.5 MG/3ML
2.5 SOLUTION RESPIRATORY (INHALATION) EVERY 4 HOURS PRN
Status: DISCONTINUED | OUTPATIENT
Start: 2024-01-09 | End: 2024-01-15 | Stop reason: HOSPADM

## 2024-01-09 RX ORDER — SODIUM CHLORIDE, SODIUM LACTATE, POTASSIUM CHLORIDE, CALCIUM CHLORIDE 600; 310; 30; 20 MG/100ML; MG/100ML; MG/100ML; MG/100ML
9 INJECTION, SOLUTION INTRAVENOUS CONTINUOUS
Status: DISCONTINUED | OUTPATIENT
Start: 2024-01-09 | End: 2024-01-10

## 2024-01-09 RX ORDER — SODIUM CHLORIDE, SODIUM LACTATE, POTASSIUM CHLORIDE, CALCIUM CHLORIDE 600; 310; 30; 20 MG/100ML; MG/100ML; MG/100ML; MG/100ML
9 INJECTION, SOLUTION INTRAVENOUS CONTINUOUS PRN
Status: DISCONTINUED | OUTPATIENT
Start: 2024-01-09 | End: 2024-01-10

## 2024-01-09 RX ORDER — FENTANYL CITRATE 50 UG/ML
50 INJECTION, SOLUTION INTRAMUSCULAR; INTRAVENOUS
Status: DISCONTINUED | OUTPATIENT
Start: 2024-01-09 | End: 2024-01-09

## 2024-01-09 RX ADMIN — SODIUM CHLORIDE 2 G: 900 INJECTION INTRAVENOUS at 11:00

## 2024-01-09 RX ADMIN — BUDESONIDE AND FORMOTEROL FUMARATE DIHYDRATE 2 PUFF: 160; 4.5 AEROSOL RESPIRATORY (INHALATION) at 20:38

## 2024-01-09 RX ADMIN — ACETAMINOPHEN 1000 MG: 500 TABLET ORAL at 14:59

## 2024-01-09 RX ADMIN — FAMOTIDINE 20 MG: 20 TABLET, FILM COATED ORAL at 09:55

## 2024-01-09 RX ADMIN — GABAPENTIN 100 MG: 100 CAPSULE ORAL at 09:55

## 2024-01-09 RX ADMIN — DEXAMETHASONE SODIUM PHOSPHATE 8 MG: 4 INJECTION, SOLUTION INTRAMUSCULAR; INTRAVENOUS at 10:58

## 2024-01-09 RX ADMIN — GABAPENTIN 100 MG: 100 CAPSULE ORAL at 15:00

## 2024-01-09 RX ADMIN — LIDOCAINE HYDROCHLORIDE 50 MG: 10 INJECTION, SOLUTION EPIDURAL; INFILTRATION; INTRACAUDAL; PERINEURAL at 10:58

## 2024-01-09 RX ADMIN — PHENYLEPHRINE HYDROCHLORIDE 0.5 MCG/KG/MIN: 10 INJECTION INTRAVENOUS at 11:27

## 2024-01-09 RX ADMIN — PHENYLEPHRINE HYDROCHLORIDE 100 MCG: 10 INJECTION INTRAVENOUS at 11:52

## 2024-01-09 RX ADMIN — ONDANSETRON 4 MG: 2 INJECTION INTRAMUSCULAR; INTRAVENOUS at 17:46

## 2024-01-09 RX ADMIN — SUGAMMADEX 200 MG: 100 INJECTION, SOLUTION INTRAVENOUS at 12:36

## 2024-01-09 RX ADMIN — PRAVASTATIN SODIUM 80 MG: 40 TABLET ORAL at 22:00

## 2024-01-09 RX ADMIN — FENTANYL CITRATE 50 MCG: 50 INJECTION, SOLUTION INTRAMUSCULAR; INTRAVENOUS at 11:35

## 2024-01-09 RX ADMIN — SODIUM CHLORIDE, POTASSIUM CHLORIDE, SODIUM LACTATE AND CALCIUM CHLORIDE: 600; 310; 30; 20 INJECTION, SOLUTION INTRAVENOUS at 10:50

## 2024-01-09 RX ADMIN — SODIUM CHLORIDE 30 ML/HR: 9 INJECTION, SOLUTION INTRAVENOUS at 15:00

## 2024-01-09 RX ADMIN — FENTANYL CITRATE 50 MCG: 50 INJECTION, SOLUTION INTRAMUSCULAR; INTRAVENOUS at 11:30

## 2024-01-09 RX ADMIN — GLYCOPYRROLATE 0.4 MG: 0.2 INJECTION INTRAMUSCULAR; INTRAVENOUS at 11:05

## 2024-01-09 RX ADMIN — SODIUM CHLORIDE, POTASSIUM CHLORIDE, SODIUM LACTATE AND CALCIUM CHLORIDE 9 ML/HR: 600; 310; 30; 20 INJECTION, SOLUTION INTRAVENOUS at 09:54

## 2024-01-09 RX ADMIN — SENNOSIDES AND DOCUSATE SODIUM 2 TABLET: 8.6; 5 TABLET ORAL at 20:48

## 2024-01-09 RX ADMIN — NICARDIPINE HYDROCHLORIDE 5 MG/HR: 2.5 INJECTION, SOLUTION INTRAVENOUS at 18:24

## 2024-01-09 RX ADMIN — BUPIVACAINE HYDROCHLORIDE: 5 INJECTION, SOLUTION EPIDURAL; INTRACAUDAL; PERINEURAL at 13:40

## 2024-01-09 RX ADMIN — PHENYLEPHRINE HYDROCHLORIDE 100 MCG: 10 INJECTION INTRAVENOUS at 12:13

## 2024-01-09 RX ADMIN — LIDOCAINE HYDROCHLORIDE 0.5 ML: 10 INJECTION, SOLUTION EPIDURAL; INFILTRATION; INTRACAUDAL; PERINEURAL at 09:54

## 2024-01-09 RX ADMIN — SODIUM CHLORIDE 2 G: 900 INJECTION INTRAVENOUS at 17:20

## 2024-01-09 RX ADMIN — POLYETHYLENE GLYCOL 3350 17 G: 17 POWDER, FOR SOLUTION ORAL at 14:59

## 2024-01-09 RX ADMIN — GABAPENTIN 100 MG: 100 CAPSULE ORAL at 20:47

## 2024-01-09 RX ADMIN — KETOROLAC TROMETHAMINE 15 MG: 15 INJECTION, SOLUTION INTRAMUSCULAR; INTRAVENOUS at 09:54

## 2024-01-09 RX ADMIN — MORPHINE SULFATE 3 MG: 10 INJECTION INTRAVENOUS at 11:54

## 2024-01-09 RX ADMIN — BUPIVACAINE HYDROCHLORIDE 3 ML: 2.5 INJECTION, SOLUTION EPIDURAL; INFILTRATION; INTRACAUDAL; PERINEURAL at 12:08

## 2024-01-09 RX ADMIN — PROPOFOL 200 MG: 10 INJECTION, EMULSION INTRAVENOUS at 10:58

## 2024-01-09 RX ADMIN — ACETAMINOPHEN 1000 MG: 500 TABLET ORAL at 20:47

## 2024-01-09 RX ADMIN — ONDANSETRON 4 MG: 2 INJECTION INTRAMUSCULAR; INTRAVENOUS at 12:17

## 2024-01-09 RX ADMIN — MUPIROCIN: 20 OINTMENT TOPICAL at 09:54

## 2024-01-09 RX ADMIN — ROCURONIUM BROMIDE 70 MG: 10 SOLUTION INTRAVENOUS at 10:58

## 2024-01-09 NOTE — INTERVAL H&P NOTE
[facility] Pre-op    Full history and physical note from office is attached.    VS: /87  HR 71  RR 16  T 97.9  sat 97%RA    LAB Results:  Lab Results   Component Value Date    WBC 8.51 12/28/2023    HGB 11.7 (L) 12/28/2023    HCT 36.0 (L) 12/28/2023    MCV 97.0 12/28/2023     12/28/2023    NEUTROABS 4.94 12/28/2023    GLUCOSE 106 (H) 12/28/2023    BUN 9 12/28/2023    CREATININE 0.92 12/28/2023    EGFRIFNONA 82 08/17/2021     12/28/2023    K 4.3 12/28/2023     12/28/2023    CO2 23.0 12/28/2023    MG 2.0 10/21/2022    CALCIUM 9.3 12/28/2023    ALBUMIN 4.0 12/29/2023    AST 15 12/29/2023    ALT 11 12/29/2023    BILITOT 0.3 12/29/2023    PTT 30.8 09/08/2023    INR 1.13 (H) 12/29/2023     Clinical Information    Nodule of lower lobe of right lung   Final Diagnosis   1.  LUNG, RIGHT LOWER LOBE, TRANSBRONCHIAL NEEDLE ASPIRATION:  Limited sample of benign surface epithelial cells, blood and pulmonary macrophages.  No tumor or granuloma identified.     2.  LUNG, RIGHT LOWER LOBE, TRANSBRONCHIAL BIOPSY:  Limited sample of bronchial tissue and fibrous tissue with no tumor or granuloma identified.     3.  LYMPH NODE, STATION 11 L, TRANSBRONCHIAL NEEDLE ASPIRATION:  Portions of anthracotic lymph node with no tumor or granuloma identified.     4.  LYMPH NODE, STATION 4L, TRANSBRONCHIAL NEEDLE ASPIRATION:  Portions of anthracotic lymph node with no tumor or granuloma identified.     5.  LYMPH NODE, STATION 7, TRANSBRONCHIAL NEEDLE ASPIRATION:  Non-small cell carcinoma (see comment).   Electronically signed by Ricardo Hoffman MD on 9/20/2023 at 1344   Comment    The station 7 specimen shows fragments of non-small cell carcinoma.  Immunohistochemical staining shows the tumor to be positive for cytokeratin 7, p63, and TTF-1 with no significant staining for p40 or Napsin.  The staining pattern raises the possibility of mixed adenocarcinoma and squamous cell carcinoma differentiation in this  "tumor.  Final classification of the tumor should be performed upon any subsequent excision.   Gross Description    1. Lung, Right Lower Lobe.  Received in formalin labeled \"RLL TBNA\" is a 1.5 x 1.2 x 0.2 cm aggregate of red-pink soft tissue and blood filtered, wrapped and submitted entirely in a single cassette.     2. Lung, Right Lower Lobe.  In formalin labeled \"RLL TB BX for path\" are multiple red soft tissue fragments aggregating 0.6 x 0.6 x 0.1 cm, filtered and submitted entirely in a single cassette.     3. Lymph Node.  Received in formalin labeled \"station 11 L TBNA for path\" is a 1 x 1 x 0.1 cm aggregate of red, hemorrhagic soft tissue that is filtered and submitted entirely in a single cassette.     4. Lymph Node.  Received in formalin labeled station 4L TBNA is a 1 x 0.8 x 0.1 cm aggregate of red-pink soft tissue and blood filtered, wrapped and submitted entirely in a single cassette.     5. Lymph Node.  Received in formalin labeled lymph node station 7 TBNA is a 1 x 1 x 0.1 cm aggregate of red-pink soft tissue and blood filtered, wrapped and submitted entirely in a single cassette. HDM     Cancer Staging (if applicable)  Cancer Patient: __ yes __no __unknown__N/A; If yes, clinical stage T:_1_ N:1__M:__, stage group or _3A_N/A      Impression: Bronchogenic cancer of right lung       Plan: RIGHT THORACOTOMY FOR RIGHT LOWER LOBECTOMY   I Joey Patel agree the above.  I saw this patient in the office yesterday to answer further questions and then again in the preoperative area.  He is aware that no guarantees are made as to cure there is always a risk of stroke bleeding infection death and he agrees to proceed      Rebecca Pugh, APRN   1/9/2024   09:36 EST  "

## 2024-01-09 NOTE — ANESTHESIA PROCEDURE NOTES
Airway  Urgency: elective    Date/Time: 1/9/2024 11:01 AM  Airway not difficult    General Information and Staff    Patient location during procedure: OR  Anesthesiologist: Justus Hernandez MD  CRNA/CAA: Irish Mackay CRNA    Indications and Patient Condition  Indications for airway management: airway protection    Preoxygenated: yes  MILS not maintained throughout  Mask difficulty assessment: 1 - vent by mask    Final Airway Details  Final airway type: endotracheal airway      Successful airway: ETT and EBT - double lumen left  Cuffed: yes   Successful intubation technique: direct laryngoscopy  Facilitating devices/methods: intubating stylet  Endotracheal tube insertion site: oral  Blade: Blake  Blade size: 4  ETT size (mm): 7.0  EBT DL size (fr): 41  Cormack-Lehane Classification: grade I - full view of glottis  Placement verified by: chest auscultation, bronchoscopy and capnometry   Measured from: lips  ETT/EBT  to lips (cm): 29  Number of attempts at approach: 1  Assessment: lips, teeth, and gum same as pre-op and atraumatic intubation    Additional Comments  Negative epigastric sounds, Breath sound equal bilaterally with symmetric chest rise and fall

## 2024-01-09 NOTE — ANESTHESIA PROCEDURE NOTES
Epidural Block      Patient reassessed immediately prior to procedure    Patient location during procedure: pre-op  Start Time: 1/9/2024 10:15 AM  Stop Time: 1/9/2024 10:31 AM  Indication:at surgeon's request and post-op pain management  Performed By  Anesthesiologist: David Ríos Jr., MD  Preanesthetic Checklist  Completed: patient identified, IV checked, site marked, risks and benefits discussed, surgical consent, monitors and equipment checked, pre-op evaluation and timeout performed  Additional Notes  Procedure:                                                                                   The pt. was placed in the sitting position and the legs placed over the side of bed in position of comfort.  The pt was instructed in optimal spine presentation and the insertion site was prepped and draped in sterile fashion.  The insertion site was anesthetized with 1% Lidocaine 2 ml.  The Epidural needle was placed without difficulty and Loss of Resistance was achieved. The labeled Epidural  Catheter was  secured at the insertion site with skin afix, mastisol,  steri strips and dressed with CHG Tegaderm.  Remaining catheter was taped to the back in a cephalad direction.  Test dose with Lidocaine 1.5% with Epinephrine 1:200,000 mcg 2ml was negative for intravascular injection  and negative for spinal injection.  Thank you.      Prep:  Pt Position:sitting  Sterile Tech:cap, gloves, mask and sterile barrier  Prep:chlorhexidine gluconate and isopropyl alcohol  Monitoring:blood pressure monitoring, continuous pulse oximetry and EKG  Epidural Block Procedure:  Approach:midline  Guidance:landmark technique and palpation technique  Location:thoracic  Level:6-7  Needle Type:Tuohy  Needle Gauge:18 G  Cath Size: 20 G  Loss of Resistance: 7cm  Cath Depth at skin:12 cm  Paresthesia: none  Aspiration:negative  Epidural test dose: to be performed in OR.  Post Assessment:  Dressing:biopatch applied, occlusive dressing applied and  secured with tape  Pt Tolerance:patient tolerated the procedure well with no apparent complications  Complications:no

## 2024-01-09 NOTE — PROGRESS NOTES
Intensive Care Follow-up     Hospital:  LOS: 0 days   Mr. David Barfield, 73 y.o. male is followed for:   Bronchogenic cancer of right lung            History of present illness:   73-year-old male with a past medical history significant for hypertension, COPD, history of tobacco use, hyperlipidemia, and allergic rhinitis.  Who had undergone biopsies of multiple pulmonary nodules back in September 2023, which showed particular that the station 7 lymph node was positive for non-small cell lung cancer.  Patient underwent chemotherapy and then adjuvant resection of the right lower lobe nodule.  Presented to the ICU post surgery.      Subjective   Interval History:  Chest tubes in place.  Pain well-controlled.  Hemodynamically stable.             The patient's past medical, surgical and social history were reviewed and updated in Epic as appropriate.       Objective     Infusions:  lactated ringers, 9 mL/hr  lactated ringers, 9 mL/hr, Last Rate: 9 mL/hr (01/09/24 0954)  Morphine PF 0.1 mg/mL, bupivacaine (PF) (MARCAINE) 0.5 % 0.125 % in sodium chloride 0.9 % 200 mL epidural,   niCARdipine, 5-15 mg/hr  sodium chloride, 30 mL/hr      Medications:  acetaminophen, 1,000 mg, Oral, Q8H  budesonide-formoterol, 2 puff, Inhalation, BID - RT  ceFAZolin, 2 g, Intravenous, Q8H  gabapentin, 100 mg, Oral, TID  [START ON 1/10/2024] heparin (porcine), 5,000 Units, Subcutaneous, Q12H  [START ON 1/10/2024] pantoprazole, 40 mg, Oral, Q AM  polyethylene glycol, 17 g, Oral, Daily  pravastatin, 80 mg, Oral, Nightly  senna-docusate sodium, 2 tablet, Oral, Nightly  tiotropium bromide monohydrate, 2 puff, Inhalation, Daily - RT      I reviewed the patient's medications.    Vital Sign Min/Max for last 24 hours  Temp  Min: 97 °F (36.1 °C)  Max: 97.6 °F (36.4 °C)   BP  Min: 94/65  Max: 164/109   Pulse  Min: 70  Max: 71   Resp  Min: 11  Max: 18   SpO2  Min: 92 %  Max: 100 %   Flow (L/min)  Min: 2  Max: 2       Input/Output for last 24 hour  "shift  No intake/output data recorded.   S RR:  [4-14] 4  GENERAL : NAD, conversant  RESPIRATORY/THORAX : normal respiratory effort and no intercostal retractions, CTAB  CARDIOVASCULAR : Normal S1/S2, RRR. no lower ext edema.  GASTROINTESTINAL : Soft, NT/ND. BS x 4 normoactive. No hepatosplenomegaly.  MUSCULOSKELETAL : No cyanosis, clubbing, or ischemia  NEUROLOGICAL: alert and oriented to person, place and time  PSYCHOLOGICAL : Appropriate affect               Invalid input(s): \"CHLORIDE\"  Estimated Creatinine Clearance: 88.5 mL/min (by C-G formula based on SCr of 0.92 mg/dL).          I reviewed the patient's new clinical results.  I reviewed the patient's new imaging results/reports including actual images and agree with reports.       Imaging Results (Last 24 Hours)       Procedure Component Value Units Date/Time    XR Chest 1 View [[id number]] Collected: 01/09/24 1320     Updated: 01/09/24 1325    Narrative:      XR CHEST 1 VW    Date of Exam: 1/9/2024 12:58 PM EST    Indication: postop    Comparison: 12/29/2023.    Findings:  2 right chest tubes are now present with their tips overlying the right upper lobe medially. There is no identifiable pneumothorax. Right transvenous pacemaker is unchanged in position. Left internal jugular port with tip in the mid SVC again noted.   There is some mild linear atelectasis in the right lung base. The left lung is clear.    Impression:      Impression:  Interval placement of 2 right chest tubes. No pneumothorax. Minimal atelectasis noted right lung base.      Electronically Signed: Elsa Schmid MD    1/9/2024 1:22 PM EST    Workstation ID: [id number]            Assessment & Plan   Impression        Bronchogenic cancer of right lung    Mixed hyperlipidemia    COPD (chronic obstructive pulmonary disease)    Mediastinal adenopathy       Plan        73-year-old male with a past medical history significant for non-small cell lung cancer, hyperlipidemia, COPD, and hypertension " who presented to the [facility] ICU on 1/9/2024 status post right lower lobectomy    Postop orders per surgery  Chest tube per CT surgery  Follow-up final pathology  Continue antihypertensive medications  Symbicort, Spiriva, and albuterol for COPD  Ensure adequate pain control  Mobilize patient per protocol  Aggressive pulmonary toilet wean oxygen to saturation greater than 88%  SCDs for DVT prophylaxis until chest tube is removed     I conducted multidisciplinary rounds in the plan of care was discussed with the multidisciplinary team at that time. In attendance at multidisciplinary rounds was clinical pharmacist, dietitian, nursing staff, and case management.    I discussed the patient's findings and my recommendations with patient and nursing staff       Nathen Lind, DO  Pulmonary, Critical care and Sleep Medicine

## 2024-01-09 NOTE — ANESTHESIA PREPROCEDURE EVALUATION
Anesthesia Evaluation     Patient summary reviewed and Nursing notes reviewed   NPO Solid Status: > 8 hours  NPO Liquid Status: > 8 hours           Airway   Mallampati: I  TM distance: >3 FB  Neck ROM: full  No difficulty expected  Dental    (+) upper dentures    Pulmonary     breath sounds clear to auscultation  Cardiovascular   Exercise tolerance: poor (<4 METS)    Rhythm: regular  Rate: normal        Neuro/Psych  GI/Hepatic/Renal/Endo      Musculoskeletal     Abdominal    Substance History      OB/GYN          Other                          Anesthesia Plan    ASA 3     general with block     (Epidural for post op pain control)    Anesthetic plan, risks, benefits, and alternatives have been provided, discussed and informed consent has been obtained with: patient.        CODE STATUS:

## 2024-01-09 NOTE — OP NOTE
Operative Report    Preop Diagnosis: Stage IIIa lung cancer status postchemotherapy          Procedure: Bronchoscopy.  Right thoracotomy.  Right lower lobectomy.  Mediastinal lymph node dissection lymph nodes level 4, 7, and 8        Surgeons: Joey Patel MD      Assistant: Bethel Bruno PA-C    The Assistant provided services of suctioning, irrigation and retraction.  Also, assisted in suture closure of parts of the skin incision.      Indication: Patient was referred with the above listed medical problems.  He understood that surgery carries with the risk of bleeding infection prolonged air leak and death.  Absolutely no guarantees as to care were made.  He understands that he is at chemotherapy but PET scan shows residual tumor.  But he seems a reasonable candidate for resection and was referred back to me for oncologist for evaluation.        Description: Supine position double-lumen endotracheal tube was placed and bronchoscopy was performed no intraluminal lesions were noted.  Patient placed in the right thoracotomy position sterile prep and drape and antibiotics given.  Posterolateral incision was made and chest suspect mass was palpable in the right lower lobe.  Formal right lower lobectomy was performed by individually ligating the inferior pulmonary vein as well as the pulmonary artery branches to the lower lobe.  Using a 30 TA bronchial stapler of the bronchus to the lower lobe was stapled without difficulty.  Chest was irrigated with an antibiotic solution and mediastinal lymph nodes level 4, 7 and 8 were removed for permanent.  Frozen section analysis of the right lower lobe demonstrated the bronchial margin free of tumor.  Pro gel sealant was used on the cut edge and inflating the chest no air leak was noted from the bronchial stump.  The ribs were closed #2 Vicryl after placement of chest tubes the muscle with 0 Vicryl and the skin edges with 3-0 Vicryl.  Estimated blood loss less than 200 mL.   The sponge and needle count reported as correct and there was no apparent early complications      Please note that portions of this note were completed with a voice recognition program. Efforts were made to edit the dictations, but occasionally words are mistranscribed.

## 2024-01-09 NOTE — PLAN OF CARE
Goal Outcome Evaluation:  Plan of Care Reviewed With: patient        Progress: improving   Cardene started 1830, emesis x1, trouble voiding, epidural in place no c/o pain, 200ml ct output since placement, family at bedside

## 2024-01-10 ENCOUNTER — APPOINTMENT (OUTPATIENT)
Dept: GENERAL RADIOLOGY | Facility: HOSPITAL | Age: 74
End: 2024-01-10
Payer: MEDICARE

## 2024-01-10 LAB
ANION GAP SERPL CALCULATED.3IONS-SCNC: 11 MMOL/L (ref 5–15)
BASOPHILS # BLD AUTO: 0.01 10*3/MM3 (ref 0–0.2)
BASOPHILS NFR BLD AUTO: 0.1 % (ref 0–1.5)
BUN SERPL-MCNC: 11 MG/DL (ref 8–23)
BUN/CREAT SERPL: 10.8 (ref 7–25)
CALCIUM SPEC-SCNC: 9 MG/DL (ref 8.6–10.5)
CHLORIDE SERPL-SCNC: 103 MMOL/L (ref 98–107)
CO2 SERPL-SCNC: 24 MMOL/L (ref 22–29)
CREAT SERPL-MCNC: 1.02 MG/DL (ref 0.76–1.27)
DEPRECATED RDW RBC AUTO: 58.2 FL (ref 37–54)
EGFRCR SERPLBLD CKD-EPI 2021: 77.1 ML/MIN/1.73
EOSINOPHIL # BLD AUTO: 0 10*3/MM3 (ref 0–0.4)
EOSINOPHIL NFR BLD AUTO: 0 % (ref 0.3–6.2)
ERYTHROCYTE [DISTWIDTH] IN BLOOD BY AUTOMATED COUNT: 16.3 % (ref 12.3–15.4)
GLUCOSE SERPL-MCNC: 140 MG/DL (ref 65–99)
HCT VFR BLD AUTO: 34.7 % (ref 37.5–51)
HGB BLD-MCNC: 11.2 G/DL (ref 13–17.7)
IMM GRANULOCYTES # BLD AUTO: 0.04 10*3/MM3 (ref 0–0.05)
IMM GRANULOCYTES NFR BLD AUTO: 0.4 % (ref 0–0.5)
LYMPHOCYTES # BLD AUTO: 1 10*3/MM3 (ref 0.7–3.1)
LYMPHOCYTES NFR BLD AUTO: 10 % (ref 19.6–45.3)
MCH RBC QN AUTO: 31.7 PG (ref 26.6–33)
MCHC RBC AUTO-ENTMCNC: 32.3 G/DL (ref 31.5–35.7)
MCV RBC AUTO: 98.3 FL (ref 79–97)
MONOCYTES # BLD AUTO: 0.8 10*3/MM3 (ref 0.1–0.9)
MONOCYTES NFR BLD AUTO: 8 % (ref 5–12)
NEUTROPHILS NFR BLD AUTO: 8.17 10*3/MM3 (ref 1.7–7)
NEUTROPHILS NFR BLD AUTO: 81.5 % (ref 42.7–76)
NRBC BLD AUTO-RTO: 0 /100 WBC (ref 0–0.2)
PLATELET # BLD AUTO: 280 10*3/MM3 (ref 140–450)
PMV BLD AUTO: 8.4 FL (ref 6–12)
POTASSIUM SERPL-SCNC: 5.2 MMOL/L (ref 3.5–5.2)
RBC # BLD AUTO: 3.53 10*6/MM3 (ref 4.14–5.8)
SODIUM SERPL-SCNC: 138 MMOL/L (ref 136–145)
WBC NRBC COR # BLD AUTO: 10.02 10*3/MM3 (ref 3.4–10.8)

## 2024-01-10 PROCEDURE — 99222 1ST HOSP IP/OBS MODERATE 55: CPT | Performed by: INTERNAL MEDICINE

## 2024-01-10 PROCEDURE — 97162 PT EVAL MOD COMPLEX 30 MIN: CPT

## 2024-01-10 PROCEDURE — 99232 SBSQ HOSP IP/OBS MODERATE 35: CPT | Performed by: INTERNAL MEDICINE

## 2024-01-10 PROCEDURE — 94799 UNLISTED PULMONARY SVC/PX: CPT

## 2024-01-10 PROCEDURE — 94664 DEMO&/EVAL PT USE INHALER: CPT

## 2024-01-10 PROCEDURE — 94761 N-INVAS EAR/PLS OXIMETRY MLT: CPT

## 2024-01-10 PROCEDURE — 25010000002 HEPARIN (PORCINE) PER 1000 UNITS: Performed by: PHYSICIAN ASSISTANT

## 2024-01-10 PROCEDURE — 71045 X-RAY EXAM CHEST 1 VIEW: CPT

## 2024-01-10 PROCEDURE — 99024 POSTOP FOLLOW-UP VISIT: CPT | Performed by: THORACIC SURGERY (CARDIOTHORACIC VASCULAR SURGERY)

## 2024-01-10 PROCEDURE — 25010000002 CEFAZOLIN PER 500 MG: Performed by: PHYSICIAN ASSISTANT

## 2024-01-10 PROCEDURE — 80048 BASIC METABOLIC PNL TOTAL CA: CPT | Performed by: PHYSICIAN ASSISTANT

## 2024-01-10 PROCEDURE — 63710000001 ONDANSETRON ODT 4 MG TABLET DISPERSIBLE: Performed by: PHYSICIAN ASSISTANT

## 2024-01-10 PROCEDURE — 25010000002 ONDANSETRON PER 1 MG: Performed by: PHYSICIAN ASSISTANT

## 2024-01-10 PROCEDURE — 85025 COMPLETE CBC W/AUTO DIFF WBC: CPT | Performed by: PHYSICIAN ASSISTANT

## 2024-01-10 RX ORDER — LIDOCAINE HYDROCHLORIDE 20 MG/ML
JELLY TOPICAL ONCE
Status: COMPLETED | OUTPATIENT
Start: 2024-01-10 | End: 2024-01-10

## 2024-01-10 RX ORDER — TAMSULOSIN HYDROCHLORIDE 0.4 MG/1
0.4 CAPSULE ORAL DAILY
Status: DISCONTINUED | OUTPATIENT
Start: 2024-01-10 | End: 2024-01-15 | Stop reason: HOSPADM

## 2024-01-10 RX ADMIN — SODIUM CHLORIDE 2 G: 900 INJECTION INTRAVENOUS at 03:59

## 2024-01-10 RX ADMIN — GABAPENTIN 100 MG: 100 CAPSULE ORAL at 08:52

## 2024-01-10 RX ADMIN — BUDESONIDE AND FORMOTEROL FUMARATE DIHYDRATE 2 PUFF: 160; 4.5 AEROSOL RESPIRATORY (INHALATION) at 21:13

## 2024-01-10 RX ADMIN — GABAPENTIN 100 MG: 100 CAPSULE ORAL at 15:34

## 2024-01-10 RX ADMIN — LIDOCAINE HYDROCHLORIDE: 20 JELLY TOPICAL at 13:51

## 2024-01-10 RX ADMIN — TIOTROPIUM BROMIDE INHALATION SPRAY 2 PUFF: 3.12 SPRAY, METERED RESPIRATORY (INHALATION) at 09:30

## 2024-01-10 RX ADMIN — PANTOPRAZOLE SODIUM 40 MG: 40 TABLET, DELAYED RELEASE ORAL at 03:59

## 2024-01-10 RX ADMIN — ONDANSETRON 4 MG: 2 INJECTION INTRAMUSCULAR; INTRAVENOUS at 08:33

## 2024-01-10 RX ADMIN — HEPARIN SODIUM 5000 UNITS: 5000 INJECTION INTRAVENOUS; SUBCUTANEOUS at 08:52

## 2024-01-10 RX ADMIN — ACETAMINOPHEN 1000 MG: 500 TABLET ORAL at 04:00

## 2024-01-10 RX ADMIN — POLYETHYLENE GLYCOL 3350 17 G: 17 POWDER, FOR SOLUTION ORAL at 08:52

## 2024-01-10 RX ADMIN — ONDANSETRON 4 MG: 4 TABLET, ORALLY DISINTEGRATING ORAL at 22:17

## 2024-01-10 RX ADMIN — HEPARIN SODIUM 5000 UNITS: 5000 INJECTION INTRAVENOUS; SUBCUTANEOUS at 22:17

## 2024-01-10 RX ADMIN — GABAPENTIN 100 MG: 100 CAPSULE ORAL at 22:17

## 2024-01-10 RX ADMIN — ACETAMINOPHEN 1000 MG: 500 TABLET ORAL at 15:34

## 2024-01-10 RX ADMIN — SENNOSIDES AND DOCUSATE SODIUM 2 TABLET: 8.6; 5 TABLET ORAL at 22:17

## 2024-01-10 RX ADMIN — ACETAMINOPHEN 1000 MG: 500 TABLET ORAL at 22:17

## 2024-01-10 RX ADMIN — BUDESONIDE AND FORMOTEROL FUMARATE DIHYDRATE 2 PUFF: 160; 4.5 AEROSOL RESPIRATORY (INHALATION) at 09:30

## 2024-01-10 RX ADMIN — TAMSULOSIN HYDROCHLORIDE 0.4 MG: 0.4 CAPSULE ORAL at 15:34

## 2024-01-10 NOTE — PROGRESS NOTES
Intensive Care Follow-up     Hospital:  LOS: 1 day   Mr. David Barfield, 73 y.o. male is followed for:   Bronchogenic cancer of right lung            History of present illness:   73-year-old male with a past medical history significant for hypertension, COPD, history of tobacco use, hyperlipidemia, and allergic rhinitis.  Who had undergone biopsies of multiple pulmonary nodules back in September 2023, which showed particular that the station 7 lymph node was positive for non-small cell lung cancer.  Patient underwent chemotherapy and then adjuvant resection of the right lower lobe nodule.  Presented to the ICU post surgery.       Subjective   Interval History:  Patient doing well this morning.  Chest tube remains in place, no significant air leak.             The patient's past medical, surgical and social history were reviewed and updated in Epic as appropriate.       Objective     Infusions:  lactated ringers, 9 mL/hr  lactated ringers, 9 mL/hr, Last Rate: 9 mL/hr (01/09/24 0954)  Morphine PF 0.1 mg/mL, bupivacaine (PF) (MARCAINE) 0.5 % 0.125 % in sodium chloride 0.9 % 200 mL epidural,   niCARdipine, 5-15 mg/hr, Last Rate: Stopped (01/10/24 0000)  sodium chloride, 30 mL/hr, Last Rate: 30 mL/hr (01/09/24 1500)      Medications:  Pharmacy Consult, , Does not apply, BID  acetaminophen, 1,000 mg, Oral, Q8H  budesonide-formoterol, 2 puff, Inhalation, BID - RT  gabapentin, 100 mg, Oral, TID  heparin (porcine), 5,000 Units, Subcutaneous, Q12H  pantoprazole, 40 mg, Oral, Q AM  polyethylene glycol, 17 g, Oral, Daily  pravastatin, 80 mg, Oral, Nightly  senna-docusate sodium, 2 tablet, Oral, Nightly  tiotropium bromide monohydrate, 2 puff, Inhalation, Daily - RT      I reviewed the patient's medications.    Vital Sign Min/Max for last 24 hours  Temp  Min: 97 °F (36.1 °C)  Max: 98.2 °F (36.8 °C)   BP  Min: 93/73  Max: 164/109   Pulse  Min: 70  Max: 78   Resp  Min: 11  Max: 18   SpO2  Min: 84 %  Max: 100 %   Flow (L/min)   Min: 2  Max: 3       Input/Output for last 24 hour shift  01/09 0701 - 01/10 0700  In: 2284.9 [P.O.:1650; I.V.:534.9]  Out: 1460 [Urine:550]   S RR:  [4-14] 4  GENERAL : NAD, conversant  RESPIRATORY/THORAX : normal respiratory effort and no intercostal retractions, CTAB  CARDIOVASCULAR : Normal S1/S2, RRR. no lower ext edema.  GASTROINTESTINAL : Soft, NT/ND. BS x 4 normoactive. No hepatosplenomegaly.  MUSCULOSKELETAL : No cyanosis, clubbing, or ischemia  NEUROLOGICAL: alert and oriented to person, place and time  PSYCHOLOGICAL : Appropriate affect     Results from last 7 days   Lab Units 01/10/24  0421   WBC 10*3/mm3 10.02   HEMOGLOBIN g/dL 11.2*   PLATELETS 10*3/mm3 280     Results from last 7 days   Lab Units 01/10/24  0421   SODIUM mmol/L 138   POTASSIUM mmol/L 5.2   CO2 mmol/L 24.0   BUN mg/dL 11   CREATININE mg/dL 1.02   GLUCOSE mg/dL 140*     Estimated Creatinine Clearance: 79.8 mL/min (by C-G formula based on SCr of 1.02 mg/dL).          I reviewed the patient's new clinical results.  I reviewed the patient's new imaging results/reports including actual images and agree with reports.       Imaging Results (Last 24 Hours)       Procedure Component Value Units Date/Time    XR Chest 1 View [[id number]] Collected: 01/10/24 0753     Updated: 01/10/24 0758    Narrative:      XR CHEST 1 VW    Date of Exam: 1/10/2024 1:27 AM EST    Indication: postop    Comparison: 1/9/2024    Findings:  Postsurgical changes of the right hemithorax with 2 right-sided chest tubes in place. No discrete pneumothorax. Minimal bibasilar atelectasis. Heart size normal. No significant effusion. AICD noted.      Impression:      Impression:  Postsurgical changes of the right hemithorax with 2 right-sided chest tubes in place. No pneumothorax.        Electronically Signed: Aldo Handley MD    1/10/2024 7:54 AM EST    Workstation ID: [id number]    XR Chest 1 View [[id number]] Collected: 01/09/24 1320     Updated: 01/09/24 1325    Narrative:      XR  CHEST 1 VW    Date of Exam: 1/9/2024 12:58 PM EST    Indication: postop    Comparison: 12/29/2023.    Findings:  2 right chest tubes are now present with their tips overlying the right upper lobe medially. There is no identifiable pneumothorax. Right transvenous pacemaker is unchanged in position. Left internal jugular port with tip in the mid SVC again noted.   There is some mild linear atelectasis in the right lung base. The left lung is clear.    Impression:      Impression:  Interval placement of 2 right chest tubes. No pneumothorax. Minimal atelectasis noted right lung base.      Electronically Signed: Elsa Schmid MD    1/9/2024 1:22 PM EST    Workstation ID: [id number]            Assessment & Plan   Impression        Bronchogenic cancer of right lung    Mixed hyperlipidemia    COPD (chronic obstructive pulmonary disease)    Mediastinal adenopathy       Plan        73-year-old male with a past medical history significant for non-small cell lung cancer, hyperlipidemia, COPD, and hypertension who presented to the [facility] ICU on 1/9/2024 status post right lower lobectomy     Postop orders per surgery  Chest tube per CT surgery  Follow-up final pathology  Continue antihypertensive medications  Symbicort, Spiriva, and albuterol for COPD  Ensure adequate pain control  Mobilize patient per protocol  Aggressive pulmonary toilet wean oxygen to saturation greater than 88%  SCDs for DVT prophylaxis until chest tube is removed     I conducted multidisciplinary rounds in the plan of care was discussed with the multidisciplinary team at that time. In attendance at multidisciplinary rounds was clinical pharmacist, dietitian, nursing staff, and case management.    I discussed the patient's findings and my recommendations with patient and nursing staff       Nathen Lind DO  Pulmonary, Critical care and Sleep Medicine

## 2024-01-10 NOTE — CASE MANAGEMENT/SOCIAL WORK
Discharge Planning Assessment  [facility]     Patient Name: David Barfield  MRN: [id number]  Today's Date: 1/10/2024    Admit Date: 1/9/2024    Plan: Home   Discharge Needs Assessment       Row Name 01/10/24 1227       Living Environment    Current Living Arrangements home    Duration at Residence 16 Steps in home      Row Name 01/10/24 1221       Living Environment    People in Home spouse    Name(s) of People in Home Angeletta    Current Living Arrangements home    Potentially Unsafe Housing Conditions none    In the past 12 months has the electric, gas, oil, or water company threatened to shut off services in your home? No    Primary Care Provided by self    Provides Primary Care For no one    Family Caregiver if Needed spouse    Family Caregiver Names Balaji    Quality of Family Relationships supportive;involved    Able to Return to Prior Arrangements yes       Resource/Environmental Concerns    Resource/Environmental Concerns none    Transportation Concerns none       Transportation Needs    In the past 12 months, has lack of transportation kept you from medical appointments or from getting medications? no    In the past 12 months, has lack of transportation kept you from meetings, work, or from getting things needed for daily living? No       Food Insecurity    Within the past 12 months, you worried that your food would run out before you got the money to buy more. Never true    Within the past 12 months, the food you bought just didn't last and you didn't have money to get more. Never true       Transition Planning    Patient/Family Anticipates Transition to home    Patient/Family Anticipated Services at Transition none    Transportation Anticipated family or friend will provide       Discharge Needs Assessment    Readmission Within the Last 30 Days no previous admission in last 30 days    Equipment Currently Used at Home wheelchair;wheelchair, motorized;lift device  chair lift    Concerns to be  Addressed no discharge needs identified    Anticipated Changes Related to Illness none    Equipment Needed After Discharge none                   Discharge Plan       Row Name 01/10/24 1227       Plan    Plan Comments Spoke with patient at bedside for IDP. Lives with spouse in [facility]. No HH. Has electric WC, WC, RW, chair lift, walk-in shower. PCP is Leon[facility]. Medicare with [employer] with scripts filled at [facility]. Plan is home with spouse to transport. CM will cont to follow.      Row Name 01/10/24 1226       Plan    Plan Home    Patient/Family in Agreement with Plan yes    Plan Comments Spoke with patient at bedside for IDP. Lives with spouse in [facility].    Final Discharge Disposition Code 01 - home or self-care                  Continued Care and Services - Admitted Since 1/9/2024    Coordination has not been started for this encounter.          Demographic Summary       Row Name 01/10/24 1220       General Information    Admission Type inpatient    Arrived From home    Referral Source admission list    Reason for Consult discharge planning    Preferred Language English                   Functional Status       Row Name 01/10/24 1221       Functional Status    Usual Activity Tolerance good    Current Activity Tolerance good       Physical Activity    On average, how many days per week do you engage in moderate to strenuous exercise (like a brisk walk)? 2 days    On average, how many minutes do you engage in exercise at this level? 20 min    Number of minutes of exercise per week 40       Functional Status, IADL    Medications independent    Meal Preparation independent    Housekeeping independent    Laundry independent    Shopping independent                   Psychosocial    No documentation.                  Abuse/Neglect    No documentation.                  Legal    No documentation.                  Substance Abuse    No documentation.                  Patient Forms    No documentation.                      Jyoti Pastor, RN

## 2024-01-10 NOTE — PROGRESS NOTES
[facility]    Acute pain service Inpatient Progress Note    Patient Name: David Barfield  :  1950  MRN:  [id number]        Acute Pain  Service Inpatient Progress Note:    Analgesia:Excellent  Pain Score:0/10  LOC: alert and awake  Cardiac: VS stable  Side Effects:None  Catheter Site:clean, dry and dressing intact  Cath type: Epidural cath(MOOG pump)  Catheter Plan:Catheter to remain Insitu and Continue catheter infusion rate unchanged

## 2024-01-10 NOTE — CONSULTS
HEMATOLOGY/ONCOLOGY INPATIENT CONSULTATION      REFERRING PHYSICIAN: Joey Patel MD    PRIMARY CARE PROVIDER: No primary care provider on file.    REASON FOR CONSULTATION: Lung cancer status post resection      HISTORY OF PRESENT ILLNESS: 73-year-old male who is well-known to me with a recent history of non-small cell squamous cell carcinoma status post neoadjuvant chemoimmunotherapy with carboplatin, paclitaxel and nivolumab completed in December 2023.  He had a good partial response on imaging and is here for a planned admission for right lower lobectomy with mediastinal dissection.    He tolerated surgery very well and has been weaned to 2 L of oxygen as of this morning.  He endorses his pain has been well-controlled.  He has a chest tube in place.  He had an episode of projectile vomiting last night which is significant other thinks was secondary to the pain medication that he had taken.  He has not yet had any oral  intake this morning but denies current nausea.  He is otherwise feeling very well.      Allergies   Allergen Reactions    Latex Other (See Comments)     Latex allergy     Tape Rash       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    Diabetes mellitus Borderline    Emphysema/COPD     Erectile disorder     GERD (gastroesophageal reflux disease)     History of chemotherapy     Hyperlipidemia     Hypertension 2019    Lung nodule     Mumps     Mumps     Pruritus     after bath    Slow to wake up after anesthesia     Wears dentures     upper only    Wears hearing aid in both ears     usually only wears right         Current Facility-Administered Medications:     !Epidural in Place--Monitor for Removal, , Does not apply, BID, Chris Bradshaw, PharmD    acetaminophen (TYLENOL) tablet 1,000 mg, 1,000 mg, Oral, Q8H, Bethel Bruno PA, 1,000 mg at 01/10/24 2370    albuterol (PROVENTIL)  nebulizer solution 0.083% 2.5 mg/3mL, 2.5 mg, Nebulization, Q4H PRN, Bethel Bruno PA    bisacodyl (DULCOLAX) suppository 10 mg, 10 mg, Rectal, Daily PRN, Bethel Bruno PA    budesonide-formoterol (SYMBICORT) 160-4.5 MCG/ACT inhaler 2 puff, 2 puff, Inhalation, BID - RT, Giuseppe Lind, DO, 2 puff at 01/10/24 0930    gabapentin (NEURONTIN) capsule 100 mg, 100 mg, Oral, TID, Joey Patel MD, 100 mg at 01/10/24 1534    heparin (porcine) 5000 UNIT/ML injection 5,000 Units, 5,000 Units, Subcutaneous, Q12H, Bethel Bruno PA, 5,000 Units at 01/10/24 0852    ketorolac (TORADOL) injection 15 mg, 15 mg, Intravenous, Q6H PRN, Joey Patel MD    lisinopril (PRINIVIL,ZESTRIL) tablet 2.5 mg, 2.5 mg, Oral, Daily PRN, Joey Patel MD    Morphine PF 0.1 mg/mL, bupivacaine (PF) (MARCAINE) 0.5 % 0.125 % in sodium chloride 0.9 % 200 mL epidural, , Epidural, Continuous, Bethel Bruno PA, Currently Infusing at 01/09/24 1430    nalbuphine (NUBAIN) injection 10 mg, 10 mg, Intravenous, Q3H PRN, Bethel Bruno PA    niCARdipine (CARDENE) 25mg in 250mL NS infusion, 5-15 mg/hr, Intravenous, Titrated, Bethel Bruno PA, Stopped at 01/10/24 0000    nitroglycerin (NITROSTAT) SL tablet 0.4 mg, 0.4 mg, Sublingual, Q5 Min PRN, Bethel Bruno PA    ondansetron ODT (ZOFRAN-ODT) disintegrating tablet 4 mg, 4 mg, Oral, Q6H PRN **OR** ondansetron (ZOFRAN) injection 4 mg, 4 mg, Intravenous, Q6H PRN, Bethel Bruno PA, 4 mg at 01/10/24 0833    oxyCODONE (ROXICODONE) immediate release tablet 5 mg, 5 mg, Oral, Q4H PRN, Joey Patel MD    pantoprazole (PROTONIX) EC tablet 40 mg, 40 mg, Oral, Q AM, LockBethel orourke, PA, 40 mg at 01/10/24 0359    polyethylene glycol (MIRALAX) packet 17 g, 17 g, Oral, Daily, Bethel Bruno, PA, 17 g at 01/10/24 0852    pravastatin (PRAVACHOL) tablet 80 mg, 80 mg, Oral, Nightly, Bethel Bruno, PA, 80 mg at 01/09/24 2200    sennosides-docusate (PERICOLACE) 8.6-50 MG per  tablet 2 tablet, 2 tablet, Oral, Nightly, Bethel Bruno PA, 2 tablet at 01/09/24 2048    tamsulosin (FLOMAX) 24 hr capsule 0.4 mg, 0.4 mg, Oral, Daily, Giuseppe Lind DO, 0.4 mg at 01/10/24 1534    tiotropium (SPIRIVA RESPIMAT) 2.5 mcg/act aerosol solution inhaler, 2 puff, Inhalation, Daily - RT, Giuseppe Lind DO, 2 puff at 01/10/24 0930    Past Surgical History:   Procedure Laterality Date    BONE BIOPSY      broken bone surgery in his face    BRONCHOSCOPY THORACOTOMY Right 1/9/2024    Procedure: THORACOTOMY FOR LOWER LOBECTOMY AND MEDISTINAL LYMPH NODE DISSECTION RIGHT;  Surgeon: Joey Patel MD;  Location:  [facility];  Service: Cardiothoracic;  Laterality: Right;    BRONCHOSCOPY WITH ION ROBOTIC ASSIST N/A 09/15/2023    Procedure: BRONCHOSCOPY NAVIGATION WITH ENDOBRONCHIAL ULTRASOUND AND ION ROBOT;  Surgeon: Octaviano Sampson MD;  Location:  [facility] ENDOSCOPY;  Service: Robotics - Pulmonary;  Laterality: N/A;  ion #6 - 0032  - 0015  Cath guide 0061    EBUS balloon removed and intact    CARDIAC ELECTROPHYSIOLOGY PROCEDURE N/A 08/17/2021    Procedure: Pacemaker DC new;  Surgeon: Kayy Box MD;  Location:  [facility] CATH INVASIVE LOCATION;  Service: Cardiology;  Laterality: N/A;    FACIAL FRACTURE SURGERY      PACEMAKER IMPLANTATION         Social History     Socioeconomic History    Marital status: Significant Other    Number of children: 3   Tobacco Use    Smoking status: Every Day     Packs/day: 1.00     Years: 53.00     Additional pack years: 0.00     Total pack years: 53.00     Types: Cigarettes     Start date: 1/1/1968    Smokeless tobacco: Never    Tobacco comments:     Still smoke about 1 ppd   Vaping Use    Vaping Use: Never used   Substance and Sexual Activity    Alcohol use: Never    Drug use: Never    Sexual activity: Defer     Partners: Female     Birth control/protection: None       Family History   Problem Relation Age of Onset    Aneurysm Mother          brain    Dementia Father     Leukemia Sister     Hypertension Paternal Grandfather     Heart disease Paternal Grandmother        Oncology/Hematology History   Malignant neoplasm of lower lobe of right lung   10/4/2023 Initial Diagnosis    Malignant neoplasm of lower lobe of right lung     10/4/2023 Cancer Staged    Staging form: Lung, AJCC 8th Edition  - Clinical: Stage IIIA (cT2b, cN2, cM0) - Signed by Neetu Ashley MD on 10/4/2023     10/23/2023 -  Chemotherapy    OP LUNG  Nivolumab 360mg /  PACLitaxel / CARBOplatin AUC=5      10/23/2023 -  Chemotherapy    OP CENTRAL VENOUS ACCESS DEVICE Access, Care, and Maintenance (CVAD)             Objective     Vitals:    01/10/24 1200 01/10/24 1300 01/10/24 1400 01/10/24 1600   BP: 117/85   143/88   BP Location: Left arm   Left arm   Patient Position: Sitting   Lying   Pulse: 71 70 70 70   Resp: 18   18   Temp: 98 °F (36.7 °C)   97.8 °F (36.6 °C)   TempSrc: Axillary   Oral   SpO2: 97% 90% 96% 90%   Weight:       Height:                       Temp:  [97.8 °F (36.6 °C)-98.2 °F (36.8 °C)] 97.8 °F (36.6 °C)       Physical Exam    General: well appearing male in no acute distress  HEENT: sclerae anicteric,  Lymphatics: no cervical, supraclavicular, or axillary adenopathy  Cardiovascular: regular rate and rhythm, no murmurs  Lungs: clear to auscultation anteriorly.  Chest tube in place.  Abdomen: soft, nontender, nondistended.  No palpable organomegaly  Extremities: no lower extremity edema  Skin: no rashes, lesions, bruising, or petechiae      LABS:    Lab Results   Component Value Date    HGB 11.2 (L) 01/10/2024    HCT 34.7 (L) 01/10/2024    MCV 98.3 (H) 01/10/2024     01/10/2024    WBC 10.02 01/10/2024    NEUTROABS 8.17 (H) 01/10/2024    LYMPHSABS 1.00 01/10/2024    MONOSABS 0.80 01/10/2024    EOSABS 0.00 01/10/2024    BASOSABS 0.01 01/10/2024     Lab Results   Component Value Date    GLUCOSE 140 (H) 01/10/2024    BUN 11 01/10/2024    CREATININE 1.02 01/10/2024    NA  138 01/10/2024    K 5.2 01/10/2024     01/10/2024    CO2 24.0 01/10/2024    CALCIUM 9.0 01/10/2024    PROTEINTOT 7.9 2023    ALBUMIN 4.0 2023    BILITOT 0.3 2023    ALKPHOS 99 2023    AST 15 2023    ALT 11 2023         IMAGING    XR Chest 1 View    Result Date: 1/10/2024  XR CHEST 1 VW Date of Exam: 1/10/2024 1:27 AM EST Indication: postop Comparison: 2024 Findings: Postsurgical changes of the right hemithorax with 2 right-sided chest tubes in place. No discrete pneumothorax. Minimal bibasilar atelectasis. Heart size normal. No significant effusion. AICD noted.     Impression: Postsurgical changes of the right hemithorax with 2 right-sided chest tubes in place. No pneumothorax. Electronically Signed: Aldo Handley MD  1/10/2024 7:54 AM EST  Workstation ID: [id number]    XR Chest 1 View    Result Date: 2024  XR CHEST 1 VW Date of Exam: 2024 12:58 PM EST Indication: postop Comparison: 2023. Findings: 2 right chest tubes are now present with their tips overlying the right upper lobe medially. There is no identifiable pneumothorax. Right transvenous pacemaker is unchanged in position. Left internal jugular port with tip in the mid SVC again noted. There is some mild linear atelectasis in the right lung base. The left lung is clear.    Impression: Interval placement of 2 right chest tubes. No pneumothorax. Minimal atelectasis noted right lung base. Electronically Signed: Elsa Schmid MD  2024 1:22 PM EST  Workstation ID: [id number]    Spirometry    Result Date: 2024  PFT interpretation PATIENT NAME:  David Barfield MRN:  [id number] :  1950; Age:  73 y.o. Impression: Available data suggest moderate obstruction.  FEV1 2.22 L, 66 % predicted.   Thanh Summers MD, [facility] Critical [facility]    Chest X-Ray PA & Lateral    Result Date: 2023  XR CHEST PA AND LATERAL Date of Exam: 2023 12:06 PM EST Indication: Pre-Op  Cardiac Surgery Comparison: PET/CT from December 27, 2023 Findings: A right-sided pacemaker is in place. A left internal jugular port has its tip at the mid SVC. There is mild scarring at the lung apices. The lungs are clear. The heart and mediastinal contours appear normal. There is no pleural effusion. The pulmonary vasculature appears normal. The osseous structures appear intact.     Impression: No acute cardiopulmonary process. Electronically Signed: Derian Mejia MD  12/29/2023 5:47 PM EST  Workstation ID: [id number]    NM PET/CT Skull Base to Mid Thigh    Result Date: 12/27/2023  NM PET/CT SKULL BASE TO MID THIGH Date of Exam: 12/27/2023 8:55 AM EST Indication: Non-small cell lung cancer (NSCLC), monitor post neoadjuvant. Comparison: CT chest 9/8/2023, PET/CT 9/8/2023 Technique: 13.0 mCi of F-18 FDG was administered intravenously. PET imaging was obtained from skull base to mid-thigh approximately 60 minutes after radiotracer injection. A low dose non contrast CT was obtained for attenuation correction and anatomic localization. Fused PET-CT and 3D MIP reconstructions were utilized for image interpretation.  Fasting blood glucose level: 103 mg/dl. Reference uptake values: Mediastinum: 3.2 SUVmax Liver: 3.5 SUVmax Normalization method: Body Weight Findings: Head and neck: Normal symmetric physiologic uptake within the brain. Physiologic uptake in the oropharynx and muscles of phonation. No hypermetabolic neck mass or cervical lymph node. Chest: Decreased size and decreased hypermetabolism of right lower lobe lung malignancy. This now measures approximately 1.6 cm in maximal diameter, with SUV max 3.5, previously 2.5 cm in diameter with SUV max 17.2. No new or enlarging pulmonary nodules noting limitations of low-dose CT technique. No additional hypermetabolic pulmonary parenchymal process. Similar appearance of a few shotty right paratracheal and right supraclavicular lymph nodes demonstrating low level FDG  avidity with SUV max 3.3 in the right lower paratracheal station; previously this measured SUV max 2.7. Redemonstration of cardiac pacer with right chest pulse generator. A left-sided chest port is in place. There is some physiologic uptake in the left ventricular myocardium. Abdomen and pelvis: No focal hypermetabolism to suggest primary or metastatic visceral or noah disease within the abdomen or pelvis. There is physiologic uptake in the GI and  tracts. No discrete adrenal nodule. There is sigmoid colonic diverticulosis without diverticulitis. There is heavy atherosclerosis of the abdominal aorta and iliac branches without evidence of aneurysm. Bones and body wall soft tissues: No focal or suspicious uptake in the body wall soft tissues or osseous structures. No discrete bony lesion on the CT images. No acute osseous findings. There is some linear uptake in the superficial tissues of the right antecubital fossa at the site of radiotracer injection.     Impression: Decreased size and decreased hypermetabolism of the known primary malignancy in the right lower lobe compatible with treatment response. Similar appearance of a few shotty right paratracheal and right supraclavicular lymph nodes with low-level FDG avidity, somewhat nonspecific. No evidence of new or distant metastatic disease. Electronically Signed: Godfrey Real MD  12/27/2023 10:41 AM EST  Workstation ID: [id number]      ASSESSMENT/PLAN:  Nonsmall cell lung cancer of the RLL, Stage IIIA status post neoadjuvant chemoimmunotherapy and is now admitted for planned lobectomy and mediastinal dissection.    -I reviewed his current labs and operative report.  -He is recovering well from surgical resection.  -Reviewed anticipated timeline for pathology results and need to await these results for further adjuvant therapy planning.  I have a follow-up scheduled with the patient on 1/23/2024 anticipate we will review results and make final adjuvant therapy  plans at that time.  -Appreciate thoracic surgery and ICU team care    Postoperative nausea and vomiting  -I recommended he schedule Zofran prior to meals today    COPD  -Weaning oxygen as tolerated.  Patient was on room air prior to diagnosis and is currently requiring 2 L of supplemental oxygen while saturating in the high 90s.        Neetu Ashley MD    1/10/2024

## 2024-01-10 NOTE — PROGRESS NOTES
CTS Progress Note       LOS: 1 day   Patient Care Team:  Maria L Hernandez RN as Nurse Navigator  Octaviano Sampson MD as Consulting Physician (Pulmonary Disease)  Neetu Ashley MD as Referring Physician (Hematology and Oncology)  Nimo Rodríguez MD as Consulting Physician (Radiation Oncology)    Chief Complaint: Bronchogenic cancer of right lung    Vital Signs:  Temp:  [97 °F (36.1 °C)-98.2 °F (36.8 °C)] 97.8 °F (36.6 °C)  Heart Rate:  [70-78] 72  Resp:  [11-18] 14  BP: ()/() 114/78    Physical Exam:  Up in chair breathing unlabored on room air     Results:     Results from last 7 days   Lab Units 01/10/24  0421   WBC 10*3/mm3 10.02   HEMOGLOBIN g/dL 11.2*   HEMATOCRIT % 34.7*   PLATELETS 10*3/mm3 280     Results from last 7 days   Lab Units 01/10/24  0421   SODIUM mmol/L 138   POTASSIUM mmol/L 5.2   CHLORIDE mmol/L 103   CO2 mmol/L 24.0   BUN mg/dL 11   CREATININE mg/dL 1.02   GLUCOSE mg/dL 140*   CALCIUM mg/dL 9.0           Imaging Results (Last 24 Hours)       Procedure Component Value Units Date/Time    XR Chest 1 View [[id number]] Resulted: 01/10/24 0128     Updated: 01/10/24 0506    XR Chest 1 View [[id number]] Collected: 01/09/24 1320     Updated: 01/09/24 1325    Narrative:      XR CHEST 1 VW    Date of Exam: 1/9/2024 12:58 PM EST    Indication: postop    Comparison: 12/29/2023.    Findings:  2 right chest tubes are now present with their tips overlying the right upper lobe medially. There is no identifiable pneumothorax. Right transvenous pacemaker is unchanged in position. Left internal jugular port with tip in the mid SVC again noted.   There is some mild linear atelectasis in the right lung base. The left lung is clear.    Impression:      Impression:  Interval placement of 2 right chest tubes. No pneumothorax. Minimal atelectasis noted right lung base.      Electronically Signed: Elsa Schmid MD    1/9/2024 1:22 PM EST    Workstation ID: [id number]            Assessment      Bronchogenic  cancer of right lung    Mixed hyperlipidemia    COPD (chronic obstructive pulmonary disease)    Mediastinal adenopathy    Patient less than 24 hours postoperative and already on room air and breathing unlabored.  Plan to transfer to telemetry today.    Plan   Transfer to fourth floor telemetry  Chest x-ray in the a.m.  Notify Dr. Neetu Ashley of patient's location    Please note that portions of this note were completed with a voice recognition program. Efforts were made to edit the dictations, but occasionally words are mistranscribed.    Joey Patel MD  01/10/24  06:54 EST

## 2024-01-10 NOTE — THERAPY EVALUATION
Patient Name: David Barfield  : 1950    MRN: [id number]                              Today's Date: 1/10/2024       Admit Date: 2024    Visit Dx:     ICD-10-CM ICD-9-CM   1. Nodule of lower lobe of right lung  R91.1 793.11   2. Bronchogenic cancer of right lung  C34.91 162.9     Patient Active Problem List   Diagnosis    High degree atrioventricular block    Bradycardia, sinus    Chronic hypotension    PVC's (premature ventricular contractions)    Presence of cardiac pacemaker    Mixed hyperlipidemia    COPD (chronic obstructive pulmonary disease)    Nodule of lower lobe of right lung    Mediastinal adenopathy    Cough    History of tobacco use, presenting hazards to health    Bronchogenic cancer of right lung    Malignant neoplasm of lower lobe of right lung     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    Diabetes mellitus Borderline    Emphysema/COPD     Erectile disorder     GERD (gastroesophageal reflux disease)     History of chemotherapy     Hyperlipidemia     Hypertension 2019    Lung nodule     Mumps     Mumps     Pruritus     after bath    Slow to wake up after anesthesia     Wears dentures     upper only    Wears hearing aid in both ears     usually only wears right     Past Surgical History:   Procedure Laterality Date    BONE BIOPSY      broken bone surgery in his face    BRONCHOSCOPY THORACOTOMY Right 2024    Procedure: THORACOTOMY FOR LOWER LOBECTOMY AND MEDISTINAL LYMPH NODE DISSECTION RIGHT;  Surgeon: Joey Patel MD;  Location: [facility] OR;  Service: Cardiothoracic;  Laterality: Right;    BRONCHOSCOPY WITH ION ROBOTIC ASSIST N/A 09/15/2023    Procedure: BRONCHOSCOPY NAVIGATION WITH ENDOBRONCHIAL ULTRASOUND AND ION ROBOT;  Surgeon: Ocatviano Sampson MD;  Location: [facility] ENDOSCOPY;  Service: Robotics - Pulmonary;  Laterality: N/A;  ion #6 - 0032  - 0015  Cath  guide [id number]    EBUS balloon removed and intact    CARDIAC ELECTROPHYSIOLOGY PROCEDURE N/A 08/17/2021    Procedure: Pacemaker DC new;  Surgeon: Kayy Box MD;  Location: [facility] INVASIVE LOCATION;  Service: Cardiology;  Laterality: N/A;    FACIAL FRACTURE SURGERY      PACEMAKER IMPLANTATION        General Information       Row Name 01/10/24 1521          Physical Therapy Time and Intention    Document Type evaluation  -KG     Mode of Treatment physical therapy  -KG       Row Name 01/10/24 1521          General Information    Patient Profile Reviewed yes  -KG     Prior Level of Function independent:;all household mobility;gait;transfer;ADL's;dressing;bathing  -KG     Existing Precautions/Restrictions fall  -KG     Barriers to Rehab medically complex  -KG       Row Name 01/10/24 1521          Living Environment    People in Home spouse  -KG       Row Name 01/10/24 1521          Home Main Entrance    Number of Stairs, Main Entrance one  -KG     Stair Railings, Main Entrance none  -KG       Row Name 01/10/24 1521          Stairs Within Home, Primary    Number of Stairs, Within Home, Primary none  -KG       Row Name 01/10/24 1521          Cognition    Orientation Status (Cognition) oriented x 4  -KG       Row Name 01/10/24 1521          Safety Issues, Functional Mobility    Safety Issues Affecting Function (Mobility) insight into deficits/self-awareness;safety precaution awareness;safety precautions follow-through/compliance  -KG     Impairments Affecting Function (Mobility) balance;coordination;endurance/activity tolerance;postural/trunk control;pain;strength  -KG               User Key  (r) = Recorded By, (t) = Taken By, (c) = Cosigned By      Initials Name Provider Type    KG Maki Hayward, PT Physical Therapist                   Mobility       Row Name 01/10/24 1521          Bed Mobility    Comment, (Bed Mobility) UIC  -KG       Row Name 01/10/24 1521          Transfers    Comment, (Transfers)  VC's for sequencing and safe hand placement.  -KG       Row Name 01/10/24 1521          Sit-Stand Transfer    Sit-Stand [place] (Transfers) contact guard;verbal cues  -KG       Row Name 01/10/24 1521          Gait/Stairs (Locomotion)    [place] Level (Gait) minimum assist (75% patient effort);verbal cues  -KG     Assistive Device (Gait) other (see comments)  B UE support on tripod monitor  -KG     Distance in Feet (Gait) 220  -KG     Deviations/Abnormal Patterns (Gait) melissa decreased;stride length decreased  -KG     Bilateral Gait Deviations heel strike decreased  -KG     Comment, (Gait/Stairs) Pt demonstrated step through gait pattern with slow melissa. Pt with adequate balance and stability. Pt denied increased pain or SOA during ambulation; did not require any rest breaks. Ambulation distance limited by fatigue.  -KG               User Key  (r) = Recorded By, (t) = Taken By, (c) = Cosigned By      Initials Name Provider Type    KG Maki Hayward, PT Physical Therapist                   Obj/Interventions       Row Name 01/10/24 1523          Range of Motion Comprehensive    General Range of Motion no range of motion deficits identified  -KG     Comment, General Range of Motion B LE WFL  -KG       Row Name 01/10/24 1523          Strength Comprehensive (MMT)    Comment, General Manual Muscle Testing (MMT) Assessment B LE grossly 3+/5  -KG       Row Name 01/10/24 1523          Balance    Balance Assessment sitting static balance;standing static balance;standing dynamic balance  -KG     Static Sitting Balance supervision  -KG     Position, Sitting Balance unsupported;sitting in chair  -KG     Static Standing Balance contact guard  -KG     Dynamic Standing Balance minimal assist  -KG     Position/Device Used, Standing Balance supported  -KG       Row Name 01/10/24 1523          Sensory Assessment (Somatosensory)    Sensory Assessment (Somatosensory) LE sensation intact  -KG               User Key   (r) = Recorded By, (t) = Taken By, (c) = Cosigned By      Initials Name Provider Type    KG Maki Hayward N, PT Physical Therapist                   Goals/Plan       Row Name 01/10/24 1525          Bed Mobility Goal 1 (PT)    Activity/Assistive Device (Bed Mobility Goal 1, PT) sit to supine;supine to sit  -KG     [place] Level/Cues Needed (Bed Mobility Goal 1, PT) independent  -KG     Time Frame (Bed Mobility Goal 1, PT) 2 weeks  -KG     Progress/Outcomes (Bed Mobility Goal 1, PT) goal ongoing  -KG       Row Name 01/10/24 1525          Transfer Goal 1 (PT)    Activity/Assistive Device (Transfer Goal 1, PT) sit-to-stand/stand-to-sit;bed-to-chair/chair-to-bed  -KG     [place] Level/Cues Needed (Transfer Goal 1, PT) independent  -KG     Time Frame (Transfer Goal 1, PT) 2 weeks  -KG     Progress/Outcome (Transfer Goal 1, PT) goal ongoing  -KG       Row Name 01/10/24 1525          Gait Training Goal 1 (PT)    Activity/Assistive Device (Gait Training Goal 1, PT) gait (walking locomotion)  -KG     [place] Level (Gait Training Goal 1, PT) independent  -KG     Distance (Gait Training Goal 1, PT) 400 feet  -KG     Time Frame (Gait Training Goal 1, PT) 2 weeks  -KG     Progress/Outcome (Gait Training Goal 1, PT) goal ongoing  -KG       Row Name 01/10/24 1525          Stairs Goal 1 (PT)    Activity/Assistive Device (Stairs Goal 1, PT) ascending stairs;descending stairs;step-to-step  -KG     [place] Level/Cues Needed (Stairs Goal 1, PT) standby assist  -KG     Number of Stairs (Stairs Goal 1, PT) 1  -KG     Time Frame (Stairs Goal 1, PT) 2 weeks  -KG     Progress/Outcome (Stairs Goal 1, PT) goal ongoing  -KG       Row Name 01/10/24 1525          Therapy Assessment/Plan (PT)    Planned Therapy Interventions (PT) balance training;bed mobility training;gait training;stair training;strengthening;transfer training  -KG               User Key  (r) = Recorded By, (t) = Taken By, (c) = Cosigned By      Initials  Name Provider Type    KG Maki Hayward, PT Physical Therapist                   Clinical Impression       Row Name 01/10/24 1523          Pain    Pretreatment Pain Rating 2/10  -KG     Posttreatment Pain Rating 2/10  -KG     Pain Location - back  -KG     Pain Intervention(s) Repositioned;Ambulation/increased activity  -KG       Row Name 01/10/24 1523          Plan of Care Review    Plan of Care Reviewed With patient  -KG     Outcome Evaluation PT initial evaluation completed for pt s/p R thoracotomy presenting with generalized weakness, c/o mild back pain, and decreased functional mobility. Pt ambulated 220ft with Ramon, progressing to periods of CGA. Pt's decreased independence warrants PT skilled care. Recommend D/C home with assistance.  -KG       Row Name 01/10/24 1523          Therapy Assessment/Plan (PT)    Patient/Family Therapy Goals Statement (PT) return to PLOF  -KG     Rehab Potential (PT) good, to achieve stated therapy goals  -KG     Criteria for Skilled Interventions Met (PT) yes;skilled treatment is necessary  -KG     Therapy Frequency (PT) daily  -KG       Row Name 01/10/24 1523          Vital Signs    Pre Systolic BP Rehab 107  -KG     Pre Treatment Diastolic BP 64  -KG     Post Systolic BP Rehab 134  -KG     Post Treatment Diastolic BP 78  -KG     Pretreatment Heart Rate (beats/min) 71  -KG     Posttreatment Heart Rate (beats/min) 70  -KG     Pre SpO2 (%) 96  -KG     O2 Delivery Pre Treatment room air  -KG     Post SpO2 (%) 97  -KG     O2 Delivery Post Treatment room air  -KG     Pre Patient Position Sitting  -KG     Intra Patient Position Standing  -KG     Post Patient Position Sitting  -KG       Row Name 01/10/24 1523          Positioning and Restraints    Pre-Treatment Position sitting in chair/recliner  -KG     Post Treatment Position chair  -KG     In Chair notified nsg;reclined;call light within reach;encouraged to call for assist;RUE elevated;LUE elevated;legs elevated  -KG                User Key  (r) = Recorded By, (t) = Taken By, (c) = Cosigned By      Initials Name Provider Type    KG Maki Hayward, PT Physical Therapist                   Outcome Measures       Row Name 01/10/24 1525          How much help from another person do you currently need...    Turning from your back to your side while in flat bed without using bedrails? 3  -KG     Moving from lying on back to sitting on the side of a flat bed without bedrails? 3  -KG     Moving to and from a bed to a chair (including a wheelchair)? 3  -KG     Standing up from a chair using your arms (e.g., wheelchair, bedside chair)? 3  -KG     Climbing 3-5 steps with a railing? 2  -KG     To walk in hospital room? 3  -KG     AM-PAC 6 Clicks Score (PT) 17  -KG     Highest Level of Mobility Goal 5 --> Static standing  -KG       Row Name 01/10/24 1525          Functional Assessment    Outcome Measure Options AM-PAC 6 Clicks Basic Mobility (PT)  -KG               User Key  (r) = Recorded By, (t) = Taken By, (c) = Cosigned By      Initials Name Provider Type    KG Maki Hayward, PT Physical Therapist                                 Physical Therapy Education       Title: PT OT SLP Therapies (In Progress)       Topic: Physical Therapy (In Progress)       Point: Mobility training (Done)       Learning Progress Summary             Patient Acceptance, E, VU,NR by KG at 1/10/2024 1028                         Point: Home exercise program (Not Started)       Learner Progress:  Not documented in this visit.              Point: Body mechanics (Done)       Learning Progress Summary             Patient Acceptance, E, VU,NR by KG at 1/10/2024 1028                         Point: Precautions (Done)       Learning Progress Summary             Patient Acceptance, E, VU,NR by KG at 1/10/2024 1028                                         User Key       Initials Effective Dates Name Provider Type Discipline    KG 05/22/20 -  Maki Hayward, PT  Physical Therapist PT                  PT Recommendation and Plan  Planned Therapy Interventions (PT): balance training, bed mobility training, gait training, stair training, strengthening, transfer training  Plan of Care Reviewed With: patient  Outcome Evaluation: PT initial evaluation completed for pt s/p R thoracotomy presenting with generalized weakness, c/o mild back pain, and decreased functional mobility. Pt ambulated 220ft with Ramon, progressing to periods of CGA. Pt's decreased independence warrants PT skilled care. Recommend D/C home with assistance.     Time Calculation:   PT Evaluation Complexity  History, PT Evaluation Complexity: 3 or more personal factors and/or comorbidities  Examination of Body Systems (PT Eval Complexity): total of 3 or more elements  Clinical Presentation (PT Evaluation Complexity): evolving  Clinical Decision Making (PT Evaluation Complexity): moderate complexity  Overall Complexity (PT Evaluation Complexity): moderate complexity     PT Charges       Row Name 01/10/24 1028             Time Calculation    Start Time 1028  -KG      PT Received On 01/10/24  -KG      PT Goal Re-Cert Due Date 01/20/24  -KG         Untimed Charges    PT Eval/Re-eval Minutes 48  -KG         Total Minutes    Untimed Charges Total Minutes 48  -KG       Total Minutes 48  -KG                User Key  (r) = Recorded By, (t) = Taken By, (c) = Cosigned By      Initials Name Provider Type    KG Maki Hayward, PT Physical Therapist                  Therapy Charges for Today       Code Description Service Date Service Provider Modifiers Qty    29208830538 HC PT EVAL MOD COMPLEXITY 4 1/10/2024 Maki Hayward, PT GP 1            PT G-Codes  Outcome Measure Options: AM-PAC 6 Clicks Basic Mobility (PT)  AM-PAC 6 Clicks Score (PT): 17  PT Discharge Summary  Anticipated Discharge Disposition (PT): home with assist    Alicia Hayward, PT  1/10/2024

## 2024-01-10 NOTE — PLAN OF CARE
Goal Outcome Evaluation:  Plan of Care Reviewed With: patient           Outcome Evaluation: PT initial evaluation completed for pt s/p R thoracotomy presenting with generalized weakness, c/o mild back pain, and decreased functional mobility. Pt ambulated 220ft with Ramon, progressing to periods of CGA. Pt's decreased independence warrants PT skilled care. Recommend D/C home with assistance.      Anticipated Discharge Disposition (PT): home with assist

## 2024-01-11 ENCOUNTER — APPOINTMENT (OUTPATIENT)
Dept: GENERAL RADIOLOGY | Facility: HOSPITAL | Age: 74
End: 2024-01-11
Payer: MEDICARE

## 2024-01-11 PROBLEM — I10 PRIMARY HYPERTENSION: Status: ACTIVE | Noted: 2024-01-11

## 2024-01-11 PROBLEM — K21.9 GERD WITHOUT ESOPHAGITIS: Status: ACTIVE | Noted: 2024-01-11

## 2024-01-11 LAB
ANION GAP SERPL CALCULATED.3IONS-SCNC: 7 MMOL/L (ref 5–15)
BUN SERPL-MCNC: 10 MG/DL (ref 8–23)
BUN/CREAT SERPL: 11.8 (ref 7–25)
CALCIUM SPEC-SCNC: 8.8 MG/DL (ref 8.6–10.5)
CHLORIDE SERPL-SCNC: 97 MMOL/L (ref 98–107)
CO2 SERPL-SCNC: 30 MMOL/L (ref 22–29)
CREAT SERPL-MCNC: 0.85 MG/DL (ref 0.76–1.27)
EGFRCR SERPLBLD CKD-EPI 2021: 91.2 ML/MIN/1.73
GLUCOSE SERPL-MCNC: 110 MG/DL (ref 65–99)
HCT VFR BLD AUTO: 31.1 % (ref 37.5–51)
HGB BLD-MCNC: 10.2 G/DL (ref 13–17.7)
POTASSIUM SERPL-SCNC: 4.7 MMOL/L (ref 3.5–5.2)
SODIUM SERPL-SCNC: 134 MMOL/L (ref 136–145)

## 2024-01-11 PROCEDURE — 97110 THERAPEUTIC EXERCISES: CPT

## 2024-01-11 PROCEDURE — 85018 HEMOGLOBIN: CPT | Performed by: STUDENT IN AN ORGANIZED HEALTH CARE EDUCATION/TRAINING PROGRAM

## 2024-01-11 PROCEDURE — 85014 HEMATOCRIT: CPT | Performed by: STUDENT IN AN ORGANIZED HEALTH CARE EDUCATION/TRAINING PROGRAM

## 2024-01-11 PROCEDURE — 97116 GAIT TRAINING THERAPY: CPT

## 2024-01-11 PROCEDURE — 94799 UNLISTED PULMONARY SVC/PX: CPT

## 2024-01-11 PROCEDURE — 25010000002 HEPARIN (PORCINE) PER 1000 UNITS: Performed by: STUDENT IN AN ORGANIZED HEALTH CARE EDUCATION/TRAINING PROGRAM

## 2024-01-11 PROCEDURE — 99024 POSTOP FOLLOW-UP VISIT: CPT

## 2024-01-11 PROCEDURE — 80048 BASIC METABOLIC PNL TOTAL CA: CPT | Performed by: STUDENT IN AN ORGANIZED HEALTH CARE EDUCATION/TRAINING PROGRAM

## 2024-01-11 PROCEDURE — 99232 SBSQ HOSP IP/OBS MODERATE 35: CPT | Performed by: NURSE PRACTITIONER

## 2024-01-11 PROCEDURE — 94664 DEMO&/EVAL PT USE INHALER: CPT

## 2024-01-11 PROCEDURE — 94761 N-INVAS EAR/PLS OXIMETRY MLT: CPT

## 2024-01-11 PROCEDURE — 71045 X-RAY EXAM CHEST 1 VIEW: CPT

## 2024-01-11 RX ORDER — SODIUM CHLORIDE 9 MG/ML
40 INJECTION, SOLUTION INTRAVENOUS AS NEEDED
Status: DISCONTINUED | OUTPATIENT
Start: 2024-01-11 | End: 2024-01-15 | Stop reason: HOSPADM

## 2024-01-11 RX ORDER — CALCIUM CARBONATE 500 MG/1
2 TABLET, CHEWABLE ORAL 3 TIMES DAILY PRN
Status: DISCONTINUED | OUTPATIENT
Start: 2024-01-11 | End: 2024-01-15 | Stop reason: HOSPADM

## 2024-01-11 RX ORDER — SODIUM CHLORIDE 0.9 % (FLUSH) 0.9 %
10 SYRINGE (ML) INJECTION EVERY 12 HOURS SCHEDULED
Status: DISCONTINUED | OUTPATIENT
Start: 2024-01-11 | End: 2024-01-15 | Stop reason: HOSPADM

## 2024-01-11 RX ORDER — SODIUM CHLORIDE 0.9 % (FLUSH) 0.9 %
10 SYRINGE (ML) INJECTION AS NEEDED
Status: DISCONTINUED | OUTPATIENT
Start: 2024-01-11 | End: 2024-01-15 | Stop reason: HOSPADM

## 2024-01-11 RX ADMIN — Medication 10 ML: at 12:18

## 2024-01-11 RX ADMIN — PRAVASTATIN SODIUM 80 MG: 40 TABLET ORAL at 00:00

## 2024-01-11 RX ADMIN — Medication 10 ML: at 20:41

## 2024-01-11 RX ADMIN — SENNOSIDES AND DOCUSATE SODIUM 2 TABLET: 8.6; 5 TABLET ORAL at 20:41

## 2024-01-11 RX ADMIN — ACETAMINOPHEN 1000 MG: 500 TABLET ORAL at 20:41

## 2024-01-11 RX ADMIN — GABAPENTIN 100 MG: 100 CAPSULE ORAL at 11:11

## 2024-01-11 RX ADMIN — GABAPENTIN 100 MG: 100 CAPSULE ORAL at 20:41

## 2024-01-11 RX ADMIN — POLYETHYLENE GLYCOL 3350 17 G: 17 POWDER, FOR SOLUTION ORAL at 11:11

## 2024-01-11 RX ADMIN — HEPARIN SODIUM 5000 UNITS: 5000 INJECTION INTRAVENOUS; SUBCUTANEOUS at 20:40

## 2024-01-11 RX ADMIN — PANTOPRAZOLE SODIUM 40 MG: 40 TABLET, DELAYED RELEASE ORAL at 06:18

## 2024-01-11 RX ADMIN — TAMSULOSIN HYDROCHLORIDE 0.4 MG: 0.4 CAPSULE ORAL at 11:11

## 2024-01-11 RX ADMIN — HEPARIN SODIUM 5000 UNITS: 5000 INJECTION INTRAVENOUS; SUBCUTANEOUS at 11:10

## 2024-01-11 RX ADMIN — ACETAMINOPHEN 1000 MG: 500 TABLET ORAL at 06:18

## 2024-01-11 RX ADMIN — BUDESONIDE AND FORMOTEROL FUMARATE DIHYDRATE 2 PUFF: 160; 4.5 AEROSOL RESPIRATORY (INHALATION) at 09:53

## 2024-01-11 RX ADMIN — Medication: at 20:34

## 2024-01-11 RX ADMIN — TIOTROPIUM BROMIDE INHALATION SPRAY 2 PUFF: 3.12 SPRAY, METERED RESPIRATORY (INHALATION) at 09:54

## 2024-01-11 RX ADMIN — BUDESONIDE AND FORMOTEROL FUMARATE DIHYDRATE 2 PUFF: 160; 4.5 AEROSOL RESPIRATORY (INHALATION) at 20:14

## 2024-01-11 RX ADMIN — CALCIUM CARBONATE (ANTACID) CHEW TAB 500 MG 2 TABLET: 500 CHEW TAB at 18:37

## 2024-01-11 NOTE — PLAN OF CARE
Goal Outcome Evaluation:  Plan of Care Reviewed With: patient        Progress: improving  Outcome Evaluation: patient ambulated 350' with Min assist x2 and rolling tripod walker for support, cues for walker placement and upright posture. Did not require rest break, no c/o SOA, weakness or dizziness. Recommend home at D/C.

## 2024-01-11 NOTE — CASE MANAGEMENT/SOCIAL WORK
Continued Stay Note  [facility]     Patient Name: David Barfield  MRN: [id number]  Today's Date: 1/11/2024    Admit Date: 1/9/2024    Plan: Home   Discharge Plan       Row Name 01/11/24 1422       Plan    Plan Home    Patient/Family in Agreement with Plan yes    Plan Comments I spoke w/patient and girlfriend in room. Plan is home, she will transport. She stated that they are not  but have lived together for quite some time, had some issues w/family post OR, did not go into detail. Has LW on file and she is named as [facility]. Still has chest tubes w/epidural. CM will continue to follow.    Final Discharge Disposition Code 01 - home or self-care                   Discharge Codes    No documentation.                 Expected Discharge Date and Time       Expected Discharge Date Expected Discharge Time    Jan 14, 2024               Buddy Daigle RN

## 2024-01-11 NOTE — PROGRESS NOTES
[facility] Medicine Services  CONSULT NOTE      Patient Name: David Barfield  : 1950  MRN: [id number]    Primary Care Physician: No primary care provider on file.  Provider requesting consultation: Joey Patel MD    Subjective   Subjective     Reason for Consultation:  Medical Management s/p right thoracotomy/lymph node dissection    HPI:  David Barfield is a 73 y.o. male w/PMH COPD, T2DM-diet controlled, GERD, HTN, HLD, right lung cancer presented to [facility] as a direct admit to Dr. Patel for thoracotomy on 2024.  After surgery, patient went to the ICU and was transferred to the floor on 1/10/2024.  Hospital medicine was consulted for medical management.    Personal History     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    Diabetes mellitus Borderline    Emphysema/COPD     Erectile disorder     GERD (gastroesophageal reflux disease)     History of chemotherapy     Hyperlipidemia     Hypertension 2019    Lung nodule     Mumps     Mumps     Pruritus     after bath    Slow to wake up after anesthesia     Wears dentures     upper only    Wears hearing aid in both ears     usually only wears right       Past Surgical History:   Procedure Laterality Date    BONE BIOPSY      broken bone surgery in his face    BRONCHOSCOPY THORACOTOMY Right 2024    Procedure: THORACOTOMY FOR LOWER LOBECTOMY AND MEDISTINAL LYMPH NODE DISSECTION RIGHT;  Surgeon: Joey Patel MD;  Location: [facility] OR;  Service: Cardiothoracic;  Laterality: Right;    BRONCHOSCOPY WITH ION ROBOTIC ASSIST N/A 09/15/2023    Procedure: BRONCHOSCOPY NAVIGATION WITH ENDOBRONCHIAL ULTRASOUND AND ION ROBOT;  Surgeon: Octaviano Sampson MD;  Location: [facility] ENDOSCOPY;  Service: Robotics - Pulmonary;  Laterality: N/A;  ion #6 - 0032  - 0015  Cath guide [id number]    EBUS balloon removed and intact     CARDIAC ELECTROPHYSIOLOGY PROCEDURE N/A 08/17/2021    Procedure: Pacemaker DC new;  Surgeon: Kayy Box MD;  Location: [facility] INVASIVE LOCATION;  Service: Cardiology;  Laterality: N/A;    FACIAL FRACTURE SURGERY      PACEMAKER IMPLANTATION         Family History:  family history includes Aneurysm in his mother; Dementia in his father; Heart disease in his paternal grandmother; Hypertension in his paternal grandfather; Leukemia in his sister. Otherwise pertinent FHx was reviewed and unremarkable.     Social History:  reports that he has been smoking cigarettes. He started smoking about 56 years ago. He has a 53.00 pack-year smoking history. He has never used smokeless tobacco. He reports that he does not drink alcohol and does not use drugs.    Medications:  Fluticasone-Umeclidin-Vilant, albuterol sulfate HFA, fluticasone, lidocaine-prilocaine, lisinopril, omeprazole, ondansetron, pravastatin, and sildenafil    Scheduled Meds:Pharmacy Consult, , Does not apply, BID  acetaminophen, 1,000 mg, Oral, Q8H  budesonide-formoterol, 2 puff, Inhalation, BID - RT  gabapentin, 100 mg, Oral, TID  heparin (porcine), 5,000 Units, Subcutaneous, Q12H  pantoprazole, 40 mg, Oral, Q AM  polyethylene glycol, 17 g, Oral, Daily  pravastatin, 80 mg, Oral, Nightly  senna-docusate sodium, 2 tablet, Oral, Nightly  sodium chloride, 10 mL, Intravenous, Q12H  tamsulosin, 0.4 mg, Oral, Daily  tiotropium bromide monohydrate, 2 puff, Inhalation, Daily - RT      Continuous Infusions:Morphine PF 0.1 mg/mL, bupivacaine (PF) (MARCAINE) 0.5 % 0.125 % in sodium chloride 0.9 % 200 mL epidural,   niCARdipine, 5-15 mg/hr, Last Rate: Stopped (01/10/24 0000)      PRN Meds:.  albuterol    bisacodyl    calcium carbonate    ketorolac    lisinopril    nalbuphine    nitroglycerin    ondansetron ODT **OR** ondansetron    oxyCODONE    sodium chloride    sodium chloride    Allergies   Allergen Reactions    Latex Other (See Comments)     Latex  allergy     Tape Rash       Objective   Objective     Vital Signs:   Temp:  [97.8 °F (36.6 °C)-98.6 °F (37 °C)] 98.6 °F (37 °C)  Heart Rate:  [70-73] 70  Resp:  [16-18] 16  BP: (113-143)/(68-88) 116/72  Flow (L/min):  [1-2] 2    Physical Exam  Constitutional: Alert, WD, WN male in NAD  ENT: Pink, moist mucous membranes   Respiratory: Nonlabored, symmetrical chest expansion, CTAB but diminished in bases bilaterally, 93% RA  Cardiovascular: RRR, no M/R/G, +DP pulses bilaterally  Gastrointestinal: Soft, NT, ND +BS  Musculoskeletal: HERNDON; no LE edema bilaterally  Neurologic: Oriented x4, strength symmetric in all extremities, follows all commands, CN II-XII intact, speech clear  Skin: No rashes on exposed skin  Psychiatric: Pleasant and cooperative; normal affect    Result Review:  I have personally reviewed the results from the time of admission and agree with these findings:  []  Laboratory  []  Radiology  []  EKG/Telemetry   []  Pathology  []  Old records  []  Other:  Most notable findings include:     LAB RESULTS:      Lab 01/11/24  0721 01/10/24  0421   WBC  --  10.02   HEMOGLOBIN 10.2* 11.2*   HEMATOCRIT 31.1* 34.7*   PLATELETS  --  280   NEUTROS ABS  --  8.17*   IMMATURE GRANS (ABS)  --  0.04   LYMPHS ABS  --  1.00   MONOS ABS  --  0.80   EOS ABS  --  0.00   MCV  --  98.3*         Lab 01/11/24  0721 01/10/24  0421   SODIUM 134* 138   POTASSIUM 4.7 5.2   CHLORIDE 97* 103   CO2 30.0* 24.0   ANION GAP 7.0 11.0   BUN 10 11   CREATININE 0.85 1.02   EGFR 91.2 77.1   GLUCOSE 110* 140*   CALCIUM 8.8 9.0                     Lab 01/09/24  0930   ABO TYPING A   RH TYPING Negative   ANTIBODY SCREEN Negative         Brief Urine Lab Results       None          Microbiology Results (last 10 days)       ** No results found for the last 240 hours. **            XR Chest 1 View    Result Date: 1/11/2024  XR CHEST 1 VW Date of Exam: 1/11/2024 8:12 AM EST Indication: Postop Comparison: 1/10/2024 Findings: Left approach central venous  catheter with distal tip in unchanged position. Right chest wall cardiac device with distal lead tips unchanged. 2 right approach thoracostomy tubes with distal tips in stable position. Unchanged cardiomediastinal silhouette. Unchanged right basilar airspace opacities. No new focal airspace consolidation. No new pleural effusion. No visible pneumothorax. Osseous structures are unchanged.     Impression: Impression: No significant interval change. Electronically Signed: Raulito Light MD  1/11/2024 8:44 AM EST  Workstation ID: [id number]    XR Chest 1 View    Result Date: 1/10/2024  XR CHEST 1 VW Date of Exam: 1/10/2024 1:27 AM EST Indication: postop Comparison: 1/9/2024 Findings: Postsurgical changes of the right hemithorax with 2 right-sided chest tubes in place. No discrete pneumothorax. Minimal bibasilar atelectasis. Heart size normal. No significant effusion. AICD noted.     Impression: Impression: Postsurgical changes of the right hemithorax with 2 right-sided chest tubes in place. No pneumothorax. Electronically Signed: Aldo Handley MD  1/10/2024 7:54 AM EST  Workstation ID: [id number]     Results for orders placed during the hospital encounter of 07/09/21    Adult Transthoracic Echo Complete W/ Cont if Necessary Per Protocol    Interpretation Summary  · Estimated left ventricular EF = 55% Left ventricular systolic function is normal.  · Left ventricular diastolic function was normal.  · Left ventricular wall thickness is consistent with mild concentric hypertrophy.  · Trace mitral and tricuspid regurgitation.      Assessment & Plan   Assessment & Plan     Active Hospital Problems    Diagnosis  POA    **Bronchogenic cancer of right lung [C34.91]  Yes    Primary hypertension [I10]  Clinically Undetermined    GERD without esophagitis [K21.9]  Yes    COPD (chronic obstructive pulmonary disease) [J44.9]  Yes    Mediastinal adenopathy [R59.0]  Yes    History of tobacco use, presenting hazards to health [Z87.891]  Not  Applicable    Mixed hyperlipidemia [E78.2]  Yes      Resolved Hospital Problems    Diagnosis Date Resolved POA    Lung cancer [C34.90] 01/09/2024 Yes     Right Lung CA  Mediastinal adenopathy  --S/p right thoracotomy, right lower lobectomy, mediastinal lymph node dissection (lymph nodes level 4, 7 and 8) done on 1/9/2024 by Dr. Patel  -- Morphine pain pump  -- Chest tubes to suction  -- Pulmonary toilet; IS q1H while awake  -- Ambulate    COPD-- Continue Spiriva and Symbicort inhalers    GERD--continue Prilosec    HTN, HLD-continue lisinopril and pravastatin    Thank you for allowing [facility] Medicine Service to provide consultative care for your patient, we will continue to follow while clinically appropriate.    Huma Plaza, APRN  01/11/24

## 2024-01-11 NOTE — PROGRESS NOTES
[facility] Cardiothoracic Surgery In-Patient Progress Note    POD # 2 s/p right thoracotomy w/ RLL     LOS: 2 days       Subjective  No complaints this morning.  On 2 L saturations 99%.  Pain controlled.      Objective  Vital Signs  Temp:  [97.8 °F (36.6 °C)-98.2 °F (36.8 °C)] 98.2 °F (36.8 °C)  Heart Rate:  [70-75] 71  Resp:  [16-18] 18  BP: (105-143)/(64-88) 113/70        Physical Exam:   General Appearance: alert, appears stated age and cooperative   Lungs: Diminished bases bilaterally   Heart: regular rhythm & normal rate, normal S1, S2, no murmur, no gallop, no rub, and no click   Skin: Incision c/d/I   CT: No air leak  Output by Drain (mL) 01/10/24 0701 - 01/10/24 1900 01/10/24 1901 - 01/11/24 0700 01/11/24 0701 - 01/11/24 0727 Range Total   Y Chest Tube 1 and 2 1 Right Pleural 32 Fr. 2 Right Pleural 32 Fr. 140 170  310        Results     Results from last 7 days   Lab Units 01/10/24  0421   WBC 10*3/mm3 10.02   HEMOGLOBIN g/dL 11.2*   HEMATOCRIT % 34.7*   PLATELETS 10*3/mm3 280     Results from last 7 days   Lab Units 01/10/24  0421   SODIUM mmol/L 138   POTASSIUM mmol/L 5.2   CHLORIDE mmol/L 103   CO2 mmol/L 24.0   BUN mg/dL 11   CREATININE mg/dL 1.02   GLUCOSE mg/dL 140*   CALCIUM mg/dL 9.0     Imaging Results (Last 24 Hours)       Procedure Component Value Units Date/Time    XR Chest 1 View [[id number]] Collected: 01/10/24 0753     Updated: 01/10/24 0758    Narrative:      XR CHEST 1 VW    Date of Exam: 1/10/2024 1:27 AM EST    Indication: postop    Comparison: 1/9/2024    Findings:  Postsurgical changes of the right hemithorax with 2 right-sided chest tubes in place. No discrete pneumothorax. Minimal bibasilar atelectasis. Heart size normal. No significant effusion. AICD noted.      Impression:      Impression:  Postsurgical changes of the right hemithorax with 2 right-sided chest tubes in place. No pneumothorax.        Electronically Signed: Aldo Handley MD    1/10/2024 7:54 AM EST    Workstation ID:  [id number]            Assessment  POD # 2 s/p right thoracotomy w/ RLL      Plan   Chest x-ray  Continue chest tubes to suction  Wean oxygen as tolerated  Ambulate  Pulmonary Toilet: IS q1 hr while awake  Awaiting final pathology      Ruma Orozco, APRN  01/11/24  07:25 EST

## 2024-01-11 NOTE — THERAPY TREATMENT NOTE
Patient Name: David Barfield  : 1950    MRN: [id number]                              Today's Date: 2024       Admit Date: 2024    Visit Dx:     ICD-10-CM ICD-9-CM   1. Nodule of lower lobe of right lung  R91.1 793.11   2. Bronchogenic cancer of right lung  C34.91 162.9     Patient Active Problem List   Diagnosis    High degree atrioventricular block    Bradycardia, sinus    Chronic hypotension    PVC's (premature ventricular contractions)    Presence of cardiac pacemaker    Mixed hyperlipidemia    COPD (chronic obstructive pulmonary disease)    Nodule of lower lobe of right lung    Mediastinal adenopathy    Cough    History of tobacco use, presenting hazards to health    Bronchogenic cancer of right lung    Malignant neoplasm of lower lobe of right lung     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    Diabetes mellitus Borderline    Emphysema/COPD     Erectile disorder     GERD (gastroesophageal reflux disease)     History of chemotherapy     Hyperlipidemia     Hypertension 2019    Lung nodule     Mumps     Mumps     Pruritus     after bath    Slow to wake up after anesthesia     Wears dentures     upper only    Wears hearing aid in both ears     usually only wears right     Past Surgical History:   Procedure Laterality Date    BONE BIOPSY      broken bone surgery in his face    BRONCHOSCOPY THORACOTOMY Right 2024    Procedure: THORACOTOMY FOR LOWER LOBECTOMY AND MEDISTINAL LYMPH NODE DISSECTION RIGHT;  Surgeon: Joey Patel MD;  Location: [facility] OR;  Service: Cardiothoracic;  Laterality: Right;    BRONCHOSCOPY WITH ION ROBOTIC ASSIST N/A 09/15/2023    Procedure: BRONCHOSCOPY NAVIGATION WITH ENDOBRONCHIAL ULTRASOUND AND ION ROBOT;  Surgeon: Octaviano Sampson MD;  Location: [facility] ENDOSCOPY;  Service: Robotics - Pulmonary;  Laterality: N/A;  ion #6 - 0032  - 0015  Cath  guide 0061    EBUS balloon removed and intact    CARDIAC ELECTROPHYSIOLOGY PROCEDURE N/A 08/17/2021    Procedure: Pacemaker DC new;  Surgeon: Kayy Box MD;  Location: [facility] CATH INVASIVE LOCATION;  Service: Cardiology;  Laterality: N/A;    FACIAL FRACTURE SURGERY      PACEMAKER IMPLANTATION        General Information       Row Name 01/11/24 0919          Physical Therapy Time and Intention    Document Type therapy note (daily note)  -AS     Mode of Treatment physical therapy  -AS       Row Name 01/11/24 0919          General Information    Patient Profile Reviewed yes  -AS     Existing Precautions/Restrictions fall  -AS     Barriers to Rehab medically complex  -AS       Row Name 01/11/24 0919          Cognition    Orientation Status (Cognition) oriented x 4  -AS       Row Name 01/11/24 0919          Safety Issues, Functional Mobility    Safety Issues Affecting Function (Mobility) awareness of need for assistance;insight into deficits/self-awareness;safety precaution awareness;safety precautions follow-through/compliance;sequencing abilities  -AS     Impairments Affecting Function (Mobility) balance;coordination;endurance/activity tolerance;postural/trunk control;pain;strength  -AS     Comment, Safety Issues/Impairments (Mobility) alert and following commands, CT, PCA pump  -AS               User Key  (r) = Recorded By, (t) = Taken By, (c) = Cosigned By      Initials Name Provider Type    AS Marie Coello PTA Physical Therapist Assistant                   Mobility       Row Name 01/11/24 0919          Bed Mobility    Sidelying-Sit [place] (Bed Mobility) modified independence  -AS     Assistive Device (Bed Mobility) head of bed elevated;bed rails  -AS     Comment, (Bed Mobility) cues for log roll technique, line management  -AS       Row Name 01/11/24 0919          Transfers    Comment, (Transfers) cues for sequencing and hand placement  -AS       Row Name 01/11/24 0919          Bed-Chair  Transfer    Bed-Chair [place] (Transfers) verbal cues;contact guard;1 person assist  -AS     Assistive Device (Bed-Chair Transfers) walker, front-wheeled  -AS     Comment, (Bed-Chair Transfer) tripod walker  -AS       Row Name 01/11/24 0919          Sit-Stand Transfer    Sit-Stand [place] (Transfers) verbal cues;contact guard;1 person assist  -AS     Assistive Device (Sit-Stand Transfers) walker, front-wheeled  -AS       Row Name 01/11/24 0919          Gait/Stairs (Locomotion)    [place] Level (Gait) verbal cues;minimum assist (75% patient effort);1 person assist  -AS     Assistive Device (Gait) walker, front-wheeled  -AS     Distance in Feet (Gait) 350  -AS     Deviations/Abnormal Patterns (Gait) melissa decreased;stride length decreased;bilateral deviations  -AS     Bilateral Gait Deviations heel strike decreased;forward flexed posture  -AS     Comment, (Gait/Stairs) patient ambulated 350' with Min assist x2 and rolling tripod walker for support, cues for walker placement and upright posture. Did not require rest break, no c/o SOA, weakness or dizziness.  -AS               User Key  (r) = Recorded By, (t) = Taken By, (c) = Cosigned By      Initials Name Provider Type    AS Marie Coello PTA Physical Therapist Assistant                   Obj/Interventions       Row Name 01/11/24 0923          Motor Skills    Therapeutic Exercise knee;ankle  -AS       Row Name 01/11/24 0923          Knee (Therapeutic Exercise)    Knee (Therapeutic Exercise) strengthening exercise  -AS     Knee Strengthening (Therapeutic Exercise) bilateral;marching while seated;LAQ (long arc quad);sitting;10 repetitions  -AS       Row Name 01/11/24 0923          Ankle (Therapeutic Exercise)    Ankle (Therapeutic Exercise) AROM (active range of motion)  -AS     Ankle AROM (Therapeutic Exercise) bilateral;dorsiflexion;plantarflexion;sitting;10 repetitions  -AS       Row Name 01/11/24 0923          Balance    Dynamic Standing  Balance verbal cues;minimal assist;1-person assist  -AS     Position/Device Used, Standing Balance supported;walker, front-wheeled  -AS     Comment, Balance min assist for safety and line management  -AS               User Key  (r) = Recorded By, (t) = Taken By, (c) = Cosigned By      Initials Name Provider Type    AS Marie Coello PTA Physical Therapist Assistant                   Goals/Plan    No documentation.                  Clinical Impression       Row Name 01/11/24 0924          Pain    Pretreatment Pain Rating 2/10  -AS     Posttreatment Pain Rating 2/10  -AS     Pain Location incisional  -AS     Pain Location - back  -AS     Pain Intervention(s) Repositioned;Ambulation/increased activity  -AS       [facility] Name 01/11/24 0924          Plan of Care Review    Plan of Care Reviewed With patient  -AS     Progress improving  -AS     Outcome Evaluation patient ambulated 350' with Min assist x2 and rolling tripod walker for support, cues for walker placement and upright posture. Did not require rest break, no c/o SOA, weakness or dizziness. Recommend home at D/C.  -AS       [facility] Name 01/11/24 0924          Positioning and Restraints    Pre-Treatment Position in bed  -AS     Post Treatment Position chair  -AS     In Chair reclined;call light within reach;encouraged to call for assist;legs elevated  -AS               User Key  (r) = Recorded By, (t) = Taken By, (c) = Cosigned By      Initials Name Provider Type    AS Marie Coello PTA Physical Therapist Assistant                   Outcome Measures       Row Name 01/11/24 0925          How much help from another person do you currently need...    Turning from your back to your side while in flat bed without using bedrails? 3  -AS     Moving from lying on back to sitting on the side of a flat bed without bedrails? 3  -AS     Moving to and from a bed to a chair (including a wheelchair)? 3  -AS     Standing up from a chair using your arms (e.g., wheelchair,  bedside chair)? 3  -AS     Climbing 3-5 steps with a railing? 2  -AS     To walk in hospital room? 3  -AS     AM-PAC 6 Clicks Score (PT) 17  -AS     Highest Level of Mobility Goal 5 --> Static standing  -AS       Row Name 01/11/24 0925          Functional Assessment    Outcome Measure Options AM-PAC 6 Clicks Basic Mobility (PT)  -AS               User Key  (r) = Recorded By, (t) = Taken By, (c) = Cosigned By      Initials Name Provider Type    AS Marie Coello PTA Physical Therapist Assistant                                 Physical Therapy Education       Title: PT OT SLP Therapies (In Progress)       Topic: Physical Therapy (In Progress)       Point: Mobility training (In Progress)       Learning Progress Summary             Patient Acceptance, E, NR by AS at 1/11/2024 0925    Acceptance, E, VU,NR by KG at 1/10/2024 1028                         Point: Home exercise program (In Progress)       Learning Progress Summary             Patient Acceptance, E, NR by AS at 1/11/2024 0925                         Point: Body mechanics (In Progress)       Learning Progress Summary             Patient Acceptance, E, NR by AS at 1/11/2024 0925    Acceptance, E, VU,NR by KG at 1/10/2024 1028                         Point: Precautions (In Progress)       Learning Progress Summary             Patient Acceptance, E, NR by AS at 1/11/2024 0925    Acceptance, E, VU,NR by KG at 1/10/2024 1028                                         User Key       Initials Effective Dates Name Provider Type Discipline    AS 04/28/23 -  Marie Coello, AMAYA Physical Therapist Assistant PT    KG 05/22/20 -  Maki Hayward, PT Physical Therapist PT                  PT Recommendation and Plan     Plan of Care Reviewed With: patient  Progress: improving  Outcome Evaluation: patient ambulated 350' with Min assist x2 and rolling tripod walker for support, cues for walker placement and upright posture. Did not require rest break, no c/o SOA,  weakness or dizziness. Recommend home at D/C.     Time Calculation:         PT Charges       Row Name 01/11/24 0925             Time Calculation    Start Time 0833  -AS      PT Received On 01/11/24  -AS      PT Goal Re-Cert Due Date 01/20/24  -AS         Timed Charges    50004 - PT Therapeutic Exercise Minutes 10  -AS      91898 - Gait Training Minutes  13  -AS         Total Minutes    Timed Charges Total Minutes 23  -AS       Total Minutes 23  -AS                User Key  (r) = Recorded By, (t) = Taken By, (c) = Cosigned By      Initials Name Provider Type    AS Marie Coello PTA Physical Therapist Assistant                  Therapy Charges for Today       Code Description Service Date Service Provider Modifiers Qty    81622666243 HC PT THER PROC EA 15 MIN 1/11/2024 Marie Coello PTA GP 1    94976984704 HC GAIT TRAINING EA 15 MIN 1/11/2024 Marie Coello PTA GP 1            PT G-Codes  Outcome Measure Options: AM-PAC 6 Clicks Basic Mobility (PT)  AM-PAC 6 Clicks Score (PT): 17       Marie Coello PTA  1/11/2024

## 2024-01-11 NOTE — PROGRESS NOTES
[facility]    Acute pain service Inpatient Progress Note    Patient Name: David Barfield  :  1950  MRN:  [id number]        Acute Pain  Service Inpatient Progress Note:    Analgesia:Excellent  Pain Score:0/10  LOC: alert and awake  Resp Status: supplemental oxygen  Cardiac: VS stable  Side Effects:None  Catheter Site:clean, dry and dressing intact  Cath type: Epidural cath(MOOG pump)  Volume: 2ml/ hr; 1 ml PCA.  Anesthesia block local: Morphine 0.1 mg/ml, Bupivicaine 0.125%  Catheter Plan:Catheter to remain Insitu and Continue catheter infusion rate unchanged

## 2024-01-12 ENCOUNTER — APPOINTMENT (OUTPATIENT)
Dept: GENERAL RADIOLOGY | Facility: HOSPITAL | Age: 74
End: 2024-01-12
Payer: MEDICARE

## 2024-01-12 LAB
ANION GAP SERPL CALCULATED.3IONS-SCNC: 8 MMOL/L (ref 5–15)
BUN SERPL-MCNC: 11 MG/DL (ref 8–23)
BUN/CREAT SERPL: 13.6 (ref 7–25)
CALCIUM SPEC-SCNC: 9.2 MG/DL (ref 8.6–10.5)
CHLORIDE SERPL-SCNC: 97 MMOL/L (ref 98–107)
CO2 SERPL-SCNC: 28 MMOL/L (ref 22–29)
CREAT SERPL-MCNC: 0.81 MG/DL (ref 0.76–1.27)
CYTO UR: NORMAL
DEPRECATED RDW RBC AUTO: 58.7 FL (ref 37–54)
EGFRCR SERPLBLD CKD-EPI 2021: 92.5 ML/MIN/1.73
ERYTHROCYTE [DISTWIDTH] IN BLOOD BY AUTOMATED COUNT: 16.4 % (ref 12.3–15.4)
GLUCOSE SERPL-MCNC: 109 MG/DL (ref 65–99)
HCT VFR BLD AUTO: 30.3 % (ref 37.5–51)
HGB BLD-MCNC: 10.1 G/DL (ref 13–17.7)
LAB AP CASE REPORT: NORMAL
LAB AP CLINICAL INFORMATION: NORMAL
LAB AP DIAGNOSIS COMMENT: NORMAL
Lab: NORMAL
MCH RBC QN AUTO: 33 PG (ref 26.6–33)
MCHC RBC AUTO-ENTMCNC: 33.3 G/DL (ref 31.5–35.7)
MCV RBC AUTO: 99 FL (ref 79–97)
PATH REPORT.FINAL DX SPEC: NORMAL
PATH REPORT.GROSS SPEC: NORMAL
PLATELET # BLD AUTO: 246 10*3/MM3 (ref 140–450)
PMV BLD AUTO: 8.8 FL (ref 6–12)
POTASSIUM SERPL-SCNC: 5 MMOL/L (ref 3.5–5.2)
QT INTERVAL: 388 MS
QTC INTERVAL: 424 MS
RBC # BLD AUTO: 3.06 10*6/MM3 (ref 4.14–5.8)
SODIUM SERPL-SCNC: 133 MMOL/L (ref 136–145)
WBC NRBC COR # BLD AUTO: 9.65 10*3/MM3 (ref 3.4–10.8)

## 2024-01-12 PROCEDURE — 25010000002 KETOROLAC TROMETHAMINE PER 15 MG: Performed by: STUDENT IN AN ORGANIZED HEALTH CARE EDUCATION/TRAINING PROGRAM

## 2024-01-12 PROCEDURE — 25010000002 HEPARIN (PORCINE) PER 1000 UNITS: Performed by: STUDENT IN AN ORGANIZED HEALTH CARE EDUCATION/TRAINING PROGRAM

## 2024-01-12 PROCEDURE — 97110 THERAPEUTIC EXERCISES: CPT

## 2024-01-12 PROCEDURE — 71045 X-RAY EXAM CHEST 1 VIEW: CPT

## 2024-01-12 PROCEDURE — 25010000002 ONDANSETRON PER 1 MG

## 2024-01-12 PROCEDURE — 94799 UNLISTED PULMONARY SVC/PX: CPT

## 2024-01-12 PROCEDURE — 25010000002 MORPHINE PER 10 MG

## 2024-01-12 PROCEDURE — 25010000002 FUROSEMIDE PER 20 MG

## 2024-01-12 PROCEDURE — 99231 SBSQ HOSP IP/OBS SF/LOW 25: CPT | Performed by: INTERNAL MEDICINE

## 2024-01-12 PROCEDURE — 25010000002 BUPIVACAINE (PF) 0.5 % SOLUTION 30 ML VIAL

## 2024-01-12 PROCEDURE — 97116 GAIT TRAINING THERAPY: CPT

## 2024-01-12 PROCEDURE — 99024 POSTOP FOLLOW-UP VISIT: CPT | Performed by: THORACIC SURGERY (CARDIOTHORACIC VASCULAR SURGERY)

## 2024-01-12 PROCEDURE — 80048 BASIC METABOLIC PNL TOTAL CA: CPT

## 2024-01-12 PROCEDURE — 93005 ELECTROCARDIOGRAM TRACING: CPT

## 2024-01-12 PROCEDURE — 25810000003 SODIUM CHLORIDE 0.9 % SOLUTION 250 ML FLEX CONT

## 2024-01-12 PROCEDURE — 93010 ELECTROCARDIOGRAM REPORT: CPT | Performed by: INTERNAL MEDICINE

## 2024-01-12 PROCEDURE — 85027 COMPLETE CBC AUTOMATED: CPT

## 2024-01-12 RX ORDER — OMEPRAZOLE 40 MG/1
40 CAPSULE, DELAYED RELEASE ORAL DAILY
Status: DISCONTINUED | OUTPATIENT
Start: 2024-01-13 | End: 2024-01-15 | Stop reason: HOSPADM

## 2024-01-12 RX ORDER — ONDANSETRON 2 MG/ML
4 INJECTION INTRAMUSCULAR; INTRAVENOUS EVERY 6 HOURS PRN
Status: DISCONTINUED | OUTPATIENT
Start: 2024-01-12 | End: 2024-01-15 | Stop reason: HOSPADM

## 2024-01-12 RX ORDER — HYDROMORPHONE HYDROCHLORIDE 1 MG/ML
0.25 INJECTION, SOLUTION INTRAMUSCULAR; INTRAVENOUS; SUBCUTANEOUS
Status: DISCONTINUED | OUTPATIENT
Start: 2024-01-12 | End: 2024-01-15 | Stop reason: HOSPADM

## 2024-01-12 RX ORDER — HYDROCODONE BITARTRATE AND ACETAMINOPHEN 7.5; 325 MG/1; MG/1
1 TABLET ORAL EVERY 6 HOURS PRN
Qty: 25 TABLET | Refills: 0 | Status: SHIPPED | OUTPATIENT
Start: 2024-01-12

## 2024-01-12 RX ORDER — OXYCODONE HYDROCHLORIDE AND ACETAMINOPHEN 5; 325 MG/1; MG/1
1 TABLET ORAL EVERY 4 HOURS PRN
Status: DISCONTINUED | OUTPATIENT
Start: 2024-01-12 | End: 2024-01-15 | Stop reason: HOSPADM

## 2024-01-12 RX ORDER — NALOXONE HCL 0.4 MG/ML
0.4 VIAL (ML) INJECTION
Status: DISCONTINUED | OUTPATIENT
Start: 2024-01-12 | End: 2024-01-15 | Stop reason: HOSPADM

## 2024-01-12 RX ORDER — FUROSEMIDE 10 MG/ML
40 INJECTION INTRAMUSCULAR; INTRAVENOUS ONCE
Status: COMPLETED | OUTPATIENT
Start: 2024-01-12 | End: 2024-01-12

## 2024-01-12 RX ADMIN — GABAPENTIN 100 MG: 100 CAPSULE ORAL at 08:27

## 2024-01-12 RX ADMIN — OXYCODONE HYDROCHLORIDE 5 MG: 5 TABLET ORAL at 22:27

## 2024-01-12 RX ADMIN — BUDESONIDE AND FORMOTEROL FUMARATE DIHYDRATE 2 PUFF: 160; 4.5 AEROSOL RESPIRATORY (INHALATION) at 21:39

## 2024-01-12 RX ADMIN — POLYETHYLENE GLYCOL 3350 17 G: 17 POWDER, FOR SOLUTION ORAL at 08:29

## 2024-01-12 RX ADMIN — ACETAMINOPHEN 1000 MG: 500 TABLET ORAL at 06:03

## 2024-01-12 RX ADMIN — TAMSULOSIN HYDROCHLORIDE 0.4 MG: 0.4 CAPSULE ORAL at 08:27

## 2024-01-12 RX ADMIN — HEPARIN SODIUM 5000 UNITS: 5000 INJECTION INTRAVENOUS; SUBCUTANEOUS at 20:28

## 2024-01-12 RX ADMIN — Medication 10 ML: at 20:29

## 2024-01-12 RX ADMIN — LISINOPRIL 2.5 MG: 5 TABLET ORAL at 08:27

## 2024-01-12 RX ADMIN — TIOTROPIUM BROMIDE INHALATION SPRAY 2 PUFF: 3.12 SPRAY, METERED RESPIRATORY (INHALATION) at 08:04

## 2024-01-12 RX ADMIN — PRAVASTATIN SODIUM 80 MG: 40 TABLET ORAL at 20:28

## 2024-01-12 RX ADMIN — ONDANSETRON 4 MG: 2 INJECTION INTRAMUSCULAR; INTRAVENOUS at 23:40

## 2024-01-12 RX ADMIN — ACETAMINOPHEN 1000 MG: 500 TABLET ORAL at 20:29

## 2024-01-12 RX ADMIN — FUROSEMIDE 40 MG: 10 INJECTION, SOLUTION INTRAMUSCULAR; INTRAVENOUS at 08:14

## 2024-01-12 RX ADMIN — HEPARIN SODIUM 5000 UNITS: 5000 INJECTION INTRAVENOUS; SUBCUTANEOUS at 08:26

## 2024-01-12 RX ADMIN — GABAPENTIN 100 MG: 100 CAPSULE ORAL at 20:28

## 2024-01-12 RX ADMIN — KETOROLAC TROMETHAMINE 15 MG: 15 INJECTION, SOLUTION INTRAMUSCULAR; INTRAVENOUS at 23:02

## 2024-01-12 RX ADMIN — Medication: at 20:29

## 2024-01-12 RX ADMIN — PANTOPRAZOLE SODIUM 40 MG: 40 TABLET, DELAYED RELEASE ORAL at 06:03

## 2024-01-12 RX ADMIN — SENNOSIDES AND DOCUSATE SODIUM 2 TABLET: 8.6; 5 TABLET ORAL at 20:28

## 2024-01-12 RX ADMIN — Medication 10 ML: at 08:28

## 2024-01-12 RX ADMIN — BUDESONIDE AND FORMOTEROL FUMARATE DIHYDRATE 2 PUFF: 160; 4.5 AEROSOL RESPIRATORY (INHALATION) at 08:03

## 2024-01-12 NOTE — PROGRESS NOTES
[facility] Medicine Services  Hostpialist PROGRESS NOTE    Patient Name: David Barfield  : 1950  MRN: [id number]    Date of Admission: 2024  Primary Care Physician: No primary care provider on file.    Subjective   Subjective     CC:  Lung cancer s/p lobectomy    HPI:  Breathing ok  No n/v/d      Objective   Objective     Vital Signs:   Temp:  [98.5 °F (36.9 °C)-98.6 °F (37 °C)] 98.5 °F (36.9 °C)  Heart Rate:  [70-81] 70  Resp:  [16-22] 18  BP: (114-124)/(72-87) 118/73  Flow (L/min):  [2] 2     Physical Exam:  Constitutional:Alert, oriented x 3, nontoxic appearing  Psych:Normal/appropriate affect  HEENT:NCAT, oropharynx clear  Neck: neck supple, full range of motion  Neuro: Face symmetric, speech clear, equal , moves all extremities  Cardiac: rrr  Resp: rhonchi RLL, chest tube in place  GI: abd soft, nontender  Skin: No extremity rash  Musculoskeletal/extremities: no cyanosis of extremities; no significant ankle edema      Results Reviewed:  LAB RESULTS:      Lab 24  05024  0721 01/10/24  0421   WBC 9.65  --  10.02   HEMOGLOBIN 10.1* 10.2* 11.2*   HEMATOCRIT 30.3* 31.1* 34.7*   PLATELETS 246  --  280   NEUTROS ABS  --   --  8.17*   IMMATURE GRANS (ABS)  --   --  0.04   LYMPHS ABS  --   --  1.00   MONOS ABS  --   --  0.80   EOS ABS  --   --  0.00   MCV 99.0*  --  98.3*         Lab 24  0509 24  0721 01/10/24  0421   SODIUM 133* 134* 138   POTASSIUM 5.0 4.7 5.2   CHLORIDE 97* 97* 103   CO2 28.0 30.0* 24.0   ANION GAP 8.0 7.0 11.0   BUN 11 10 11   CREATININE 0.81 0.85 1.02   EGFR 92.5 91.2 77.1   GLUCOSE 109* 110* 140*   CALCIUM 9.2 8.8 9.0                     Lab 24  0930   ABO TYPING A   RH TYPING Negative   ANTIBODY SCREEN Negative         Brief Urine Lab Results       None            Microbiology Results Abnormal       None            XR Chest 1 View    Result Date: 2024  XR CHEST 1 VW Date of Exam: 2024 7:38 AM CST Indication:  dyspnea Comparison: 1/12/2024 at 3:18 a.m. Findings: Cardiomediastinal silhouette and support lines and tubes are unchanged. Stable right lung volume loss with basilar airspace disease and potential small subpulmonic pneumothorax. Left basilar airspace disease is unchanged. No acute osseous abnormality identified.     Impression: Impression: 1.Probable small right subpulmonic pneumothorax. No significant change otherwise.. Electronically Signed: Bertrand Gongora MD  1/12/2024 8:01 AM CST  Workstation ID: [id number]    XR Chest 1 View    Result Date: 1/12/2024  XR CHEST 1 VW Date of Exam: 1/12/2024 3:03 AM EST Indication: postop right thoracotomy Comparison: 1/11/2024 Findings: 2 right-sided chest tubes are in stable position. No discrete pneumothorax. Volume loss with left right shift of the heart and mediastinum. Left-sided port catheter stable. AICD noted. Mild bibasilar atelectasis. No significant effusion.     Impression: Impression: 1. Stable right-sided chest tubes. No discrete pneumothorax. 2. Stable volume loss with left to right shift of the heart and mediastinum. 3. Mild bibasilar atelectasis. Electronically Signed: Aldo Handley MD  1/12/2024 7:40 AM EST  Workstation ID: [id number]    XR Chest 1 View    Result Date: 1/11/2024  XR CHEST 1 VW Date of Exam: 1/11/2024 8:12 AM EST Indication: Postop Comparison: 1/10/2024 Findings: Left approach central venous catheter with distal tip in unchanged position. Right chest wall cardiac device with distal lead tips unchanged. 2 right approach thoracostomy tubes with distal tips in stable position. Unchanged cardiomediastinal silhouette. Unchanged right basilar airspace opacities. No new focal airspace consolidation. No new pleural effusion. No visible pneumothorax. Osseous structures are unchanged.     Impression: Impression: No significant interval change. Electronically Signed: Raulito Light MD  1/11/2024 8:44 AM EST  Workstation ID: [id number]     Results for orders placed  during the hospital encounter of 07/09/21    Adult Transthoracic Echo Complete W/ Cont if Necessary Per Protocol    Interpretation Summary  · Estimated left ventricular EF = 55% Left ventricular systolic function is normal.  · Left ventricular diastolic function was normal.  · Left ventricular wall thickness is consistent with mild concentric hypertrophy.  · Trace mitral and tricuspid regurgitation.      Current medications:  Scheduled Meds:Pharmacy Consult, , Does not apply, BID  [START ON 1/13/2024] omeprazole, 40 mg, Oral, Daily  acetaminophen, 1,000 mg, Oral, Q8H  budesonide-formoterol, 2 puff, Inhalation, BID - RT  gabapentin, 100 mg, Oral, TID  heparin (porcine), 5,000 Units, Subcutaneous, Q12H  Pharmacy Meds to Bed Consult, , Does not apply, Daily  polyethylene glycol, 17 g, Oral, Daily  pravastatin, 80 mg, Oral, Nightly  senna-docusate sodium, 2 tablet, Oral, Nightly  sodium chloride, 10 mL, Intravenous, Q12H  tamsulosin, 0.4 mg, Oral, Daily  tiotropium bromide monohydrate, 2 puff, Inhalation, Daily - RT      Continuous Infusions:Morphine PF 0.1 mg/mL, bupivacaine (PF) (MARCAINE) 0.125 % in sodium chloride 0.9 % 200 mL epidural,       PRN Meds:.  albuterol    bisacodyl    calcium carbonate    HYDROmorphone    ketorolac    lisinopril    nalbuphine    naloxone    nitroglycerin    ondansetron ODT **OR** ondansetron    ondansetron    oxyCODONE    oxyCODONE-acetaminophen    sodium chloride    sodium chloride    Assessment & Plan   Assessment & Plan     Active Hospital Problems    Diagnosis  POA    **Bronchogenic cancer of right lung [C34.91]  Yes    Primary hypertension [I10]  Clinically Undetermined    GERD without esophagitis [K21.9]  Yes    COPD (chronic obstructive pulmonary disease) [J44.9]  Yes    Mediastinal adenopathy [R59.0]  Yes    History of tobacco use, presenting hazards to health [Z87.891]  Not Applicable    Mixed hyperlipidemia [E78.2]  Yes      Resolved Hospital Problems    Diagnosis Date Resolved  POA    Lung cancer [C34.90] 01/09/2024 Yes        Brief Hospital Course to date:  David Barfield is a 73 y.o. male w/ non-small cell lung cancer of RLL (stage IIIA) s/p neoadjuvant systemic therapy, s/p planned right lower lobectomy & mediastinal dissection. Transferred out of icu to telemetry on 1/11/24 and hospitalists asked to medically co-manage      Right Lung CA, s/p right lower lobectomy  --S/p right thoracotomy, right lower lobectomy, mediastinal lymph node dissection (lymph nodes level 4, 7 and 8) done on 1/9/2024 by Dr. Patel. Post-op care per CT surgery. Chest tubes to waterseal 1/12/24, possible chest tube removal 1/13/24   -pain control per ct surgery  -home soon when ok w/ salena    Pre-DM  -A1c 6.1  -diabetic diet  -not requiring medical treatment currently  -follow up w/ pcp    Constipation  -stool softeners    COPD  -- Continue Spiriva and Symbicort inhalers; stable    GERD  --continue Prilosec    HTN, HLD  -continue lisinopril and pravastatin    -will follow with you    Expected Discharge Date: 1/14/2024; Expected Discharge Time:      DVT prophylaxis:  Medical and mechanical DVT prophylaxis orders are present.     AM-PAC 6 Clicks Score (PT): 21 (01/12/24 6569)    CODE STATUS:   Code Status and Medical Interventions:   Ordered at: 01/09/24 6111     Code Status (Patient has no pulse and is not breathing):    CPR (Attempt to Resuscitate)     Medical Interventions (Patient has pulse or is breathing):    Full Support       Giuseppe Roberto MD  01/12/24    '

## 2024-01-12 NOTE — THERAPY TREATMENT NOTE
Patient Name: David Barfield  : 1950    MRN: [id number]                              Today's Date: 2024       Admit Date: 2024    Visit Dx:     ICD-10-CM ICD-9-CM   1. Nodule of lower lobe of right lung  R91.1 793.11   2. Bronchogenic cancer of right lung  C34.91 162.9     Patient Active Problem List   Diagnosis    High degree atrioventricular block    Bradycardia, sinus    Chronic hypotension    PVC's (premature ventricular contractions)    Presence of cardiac pacemaker    Mixed hyperlipidemia    COPD (chronic obstructive pulmonary disease)    Nodule of lower lobe of right lung    Mediastinal adenopathy    Cough    History of tobacco use, presenting hazards to health    Bronchogenic cancer of right lung    Malignant neoplasm of lower lobe of right lung    Primary hypertension    GERD without esophagitis     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    Diabetes mellitus Borderline    Emphysema/COPD     Erectile disorder     GERD (gastroesophageal reflux disease)     History of chemotherapy     Hyperlipidemia     Hypertension 2019    Lung nodule     Mumps     Mumps     Pruritus     after bath    Slow to wake up after anesthesia     Wears dentures     upper only    Wears hearing aid in both ears     usually only wears right     Past Surgical History:   Procedure Laterality Date    BONE BIOPSY      broken bone surgery in his face    BRONCHOSCOPY THORACOTOMY Right 2024    Procedure: THORACOTOMY FOR LOWER LOBECTOMY AND MEDISTINAL LYMPH NODE DISSECTION RIGHT;  Surgeon: Joey Patel MD;  Location: [facility] OR;  Service: Cardiothoracic;  Laterality: Right;    BRONCHOSCOPY WITH ION ROBOTIC ASSIST N/A 09/15/2023    Procedure: BRONCHOSCOPY NAVIGATION WITH ENDOBRONCHIAL ULTRASOUND AND ION ROBOT;  Surgeon: Octaviano Sampson MD;  Location: [facility] ENDOSCOPY;  Service: Robotics -  Pulmonary;  Laterality: N/A;  ion #6 - 0032  - 0015  Cath guide 0061    EBUS balloon removed and intact    CARDIAC ELECTROPHYSIOLOGY PROCEDURE N/A 08/17/2021    Procedure: Pacemaker DC new;  Surgeon: Kayy Box MD;  Location: [facility] CATH INVASIVE LOCATION;  Service: Cardiology;  Laterality: N/A;    FACIAL FRACTURE SURGERY      PACEMAKER IMPLANTATION        General Information       Row Name 01/12/24 1606          Physical Therapy Time and Intention    Document Type therapy note (daily note)  -SD     Mode of Treatment physical therapy  -SD       Row Name 01/12/24 1606          General Information    Existing Precautions/Restrictions fall;oxygen therapy device and L/min  pca pump, chest tube  -SD       Row Name 01/12/24 1606          Cognition    Orientation Status (Cognition) oriented x 4  -SD       Row Name 01/12/24 1606          Safety Issues, Functional Mobility    Safety Issues Affecting Function (Mobility) awareness of need for assistance;sequencing abilities;safety precautions follow-through/compliance  -SD     Impairments Affecting Function (Mobility) balance;coordination;endurance/activity tolerance;postural/trunk control;pain;strength  -SD               User Key  (r) = Recorded By, (t) = Taken By, (c) = Cosigned By      Initials Name Provider Type    SD Keira Smith, PT Physical Therapist                   Mobility       Row Name 01/12/24 1635          Bed Mobility    Comment, (Bed Mobility) pt seated EOB with wife upon arrival  -SD       Row Name 01/12/24 1635          Transfers    Comment, (Transfers) STS from EOB with SBA and bathroom toilet with Ramon due to low surface height  -SD       Row Name 01/12/24 1635          Sit-Stand Transfer    Sit-Stand [place] (Transfers) verbal cues;1 person assist;standby assist  -SD     Assistive Device (Sit-Stand Transfers) other (see comments)  -SD     Comment, (Sit-Stand Transfer) tripod walker  -SD       Row Name 01/12/24 1634           Gait/Stairs (Locomotion)    [place] Level (Gait) contact guard;1 person assist;1 person to manage equipment  -SD     Assistive Device (Gait) other (see comments)  tripod walker  -SD     Distance in Feet (Gait) 350  -SD     Deviations/Abnormal Patterns (Gait) melissa decreased;stride length decreased;bilateral deviations  -SD     Bilateral Gait Deviations forward flexed posture  -SD     Comment, (Gait/Stairs) Pt amb in hallway with tripod walker and CGA x1 +1 for equip. Pt did not require rest break, denied pain, dyspnea, or dizziness.  -SD               User Key  (r) = Recorded By, (t) = Taken By, (c) = Cosigned By      Initials Name Provider Type    Keira Lloyd PT Physical Therapist                   Obj/Interventions       Row Name 01/12/24 1637          Balance    Balance Assessment sitting static balance;sitting dynamic balance;standing static balance  -SD     Static Sitting Balance independent  -SD     Dynamic Sitting Balance supervision  -SD     Position, Sitting Balance unsupported;sitting edge of bed;other (see comments)  bathroom toilet  -SD     Static Standing Balance standby assist  -SD     Balance Interventions sitting;standing;occupation based/functional task;static;dynamic  -SD     Comment, Balance toileting ADL's  -SD               User Key  (r) = Recorded By, (t) = Taken By, (c) = Cosigned By      Initials Name Provider Type    Keira Lloyd PT Physical Therapist                   Goals/Plan    No documentation.                  Clinical Impression       Row Name 01/12/24 1638          Pain    Additional Documentation Pain Scale: FACES Pre/Post-Treatment (Group)  -SD       Row Name 01/12/24 1058          Pain Scale: FACES Pre/Post-Treatment    Pain: FACES Scale, Pretreatment 4-->hurts little more  -SD     Posttreatment Pain Rating 4-->hurts little more  -SD     Pain Location incisional  -SD     Pre/Posttreatment Pain Comment chest tube site  -SD       Row Name 01/12/24  1638          Plan of Care Review    Plan of Care Reviewed With patient;spouse  -SD     Progress improving  -SD     Outcome Evaluation Pt amb in hallway with tripod walker and CGA x1 +1 for equip. Pt did not require rest break, denied pain, dyspnea, or dizziness. Pt dem increased independence with mobility this date.  -SD       Row Name 01/12/24 1638          Vital Signs    Pre Systolic BP Rehab 118  -SD     Pre Treatment Diastolic BP 73  -SD     Pretreatment Heart Rate (beats/min) 72  -SD     Pre SpO2 (%) 100  -SD     O2 Delivery Pre Treatment nasal cannula  -SD       Row Name 01/12/24 1638          Positioning and Restraints    Pre-Treatment Position in bed  -SD     Post Treatment Position chair  -SD     In Chair reclined;call light within reach;encouraged to call for assist;with family/caregiver;waffle cushion;heels elevated;with nsg  -SD               User Key  (r) = Recorded By, (t) = Taken By, (c) = Cosigned By      Initials Name Provider Type    Keira Lloyd, DENEEN Physical Therapist                   Outcome Measures       Row Name 01/12/24 1639          How much help from another person do you currently need...    Turning from your back to your side while in flat bed without using bedrails? 4  -SD     Moving from lying on back to sitting on the side of a flat bed without bedrails? 4  -SD     Moving to and from a bed to a chair (including a wheelchair)? 3  -SD     Standing up from a chair using your arms (e.g., wheelchair, bedside chair)? 4  -SD     Climbing 3-5 steps with a railing? 3  -SD     To walk in hospital room? 3  -SD     AM-PAC 6 Clicks Score (PT) 21  -SD     Highest Level of Mobility Goal 6 --> Walk 10 steps or more  -SD       Row Name 01/12/24 1639          Functional Assessment    Outcome Measure Options AM-PAC 6 Clicks Basic Mobility (PT)  -SD               User Key  (r) = Recorded By, (t) = Taken By, (c) = Cosigned By      Initials Name Provider Type    Keira Lloyd, DENEEN  Physical Therapist                                 Physical Therapy Education       Title: PT OT SLP Therapies (Done)       Topic: Physical Therapy (Done)       Point: Mobility training (Done)       Learning Progress Summary             Patient Eager, E, VU by SD at 1/12/2024 1639    Acceptance, E, NR by AS at 1/11/2024 0925    Acceptance, E, VU,NR by KG at 1/10/2024 1028   Significant Other Eager, E, VU by SD at 1/12/2024 1639                         Point: Home exercise program (Done)       Learning Progress Summary             Patient Eager, E, VU by SD at 1/12/2024 1639    Acceptance, E, NR by AS at 1/11/2024 0925   Significant Other Eager, E, VU by SD at 1/12/2024 1639                         Point: Body mechanics (Done)       Learning Progress Summary             Patient Eager, E, VU by SD at 1/12/2024 1639    Acceptance, E, NR by AS at 1/11/2024 0925    Acceptance, E, VU,NR by KG at 1/10/2024 1028   Significant Other Eager, E, VU by SD at 1/12/2024 1639                         Point: Precautions (Done)       Learning Progress Summary             Patient Eager, E, VU by SD at 1/12/2024 1639    Acceptance, E, NR by AS at 1/11/2024 0925    Acceptance, E, VU,NR by KG at 1/10/2024 1028   Significant Other Eager, E, VU by SD at 1/12/2024 1639                                         User Key       Initials Effective Dates Name Provider Type Discipline    SD 03/13/23 -  Keira Smith, PT Physical Therapist PT    AS 04/28/23 -  Marie Coello PTA Physical Therapist Assistant PT    KG 05/22/20 -  Maki Hayward PT Physical Therapist PT                  PT Recommendation and Plan     Plan of Care Reviewed With: patient, spouse  Progress: improving  Outcome Evaluation: Pt amb in hallway with tripod walker and CGA x1 +1 for equip. Pt did not require rest break, denied pain, dyspnea, or dizziness. Pt dem increased independence with mobility this date.     Time Calculation:         PT Charges       Row  Name 01/12/24 1640             Time Calculation    Start Time 1606  -SD      PT Received On 01/12/24  -SD      PT Goal Re-Cert Due Date 01/20/24  -SD         Time Calculation- PT    Total Timed Code Minutes- PT 24 minute(s)  -SD         Timed Charges    21666 - PT Therapeutic Exercise Minutes 8  -SD      62312 - Gait Training Minutes  16  -SD         Total Minutes    Timed Charges Total Minutes 24  -SD       Total Minutes 24  -SD                User Key  (r) = Recorded By, (t) = Taken By, (c) = Cosigned By      Initials Name Provider Type    SD Keira Smith, PT Physical Therapist                  Therapy Charges for Today       Code Description Service Date Service Provider Modifiers Qty    32184340433 HC PT THER PROC EA 15 MIN 1/12/2024 Keira Smith, PT GP 1    58979799843 HC GAIT TRAINING EA 15 MIN 1/12/2024 Keira Smith, PT GP 1            PT G-Codes  Outcome Measure Options: AM-PAC 6 Clicks Basic Mobility (PT)  AM-PAC 6 Clicks Score (PT): 21  PT Discharge Summary  Anticipated Discharge Disposition (PT): home with assist    Keira Smith, DENEEN  1/12/2024

## 2024-01-12 NOTE — PROGRESS NOTES
CTS Progress Note       LOS: 3 days   Patient Care Team:  Maria L Hernandez RN as Nurse Navigator  Octaviano Sampson MD as Consulting Physician (Pulmonary Disease)  Neetu Ashley MD as Referring Physician (Hematology and Oncology)  Nimo Rodríguez MD as Consulting Physician (Radiation Oncology)    Chief Complaint: Bronchogenic cancer of right lung    Vital Signs:  Temp:  [98 °F (36.7 °C)-98.6 °F (37 °C)] 98.5 °F (36.9 °C)  Heart Rate:  [70-81] 72  Resp:  [16-22] 18  BP: (114-136)/(72-87) 114/72    Physical Exam: Breathing unlabored on room air       Results:     Results from last 7 days   Lab Units 01/12/24  0509   WBC 10*3/mm3 9.65   HEMOGLOBIN g/dL 10.1*   HEMATOCRIT % 30.3*   PLATELETS 10*3/mm3 246     Results from last 7 days   Lab Units 01/11/24  0721   SODIUM mmol/L 134*   POTASSIUM mmol/L 4.7   CHLORIDE mmol/L 97*   CO2 mmol/L 30.0*   BUN mg/dL 10   CREATININE mg/dL 0.85   GLUCOSE mg/dL 110*   CALCIUM mg/dL 8.8           Imaging Results (Last 24 Hours)       Procedure Component Value Units Date/Time    XR Chest 1 View [[id number]] Resulted: 01/12/24 0303     Updated: 01/12/24 0352    XR Chest 1 View [[id number]] Collected: 01/11/24 0843     Updated: 01/11/24 0847    Narrative:      XR CHEST 1 VW    Date of Exam: 1/11/2024 8:12 AM EST    Indication: Postop    Comparison: 1/10/2024    Findings:  Left approach central venous catheter with distal tip in unchanged position. Right chest wall cardiac device with distal lead tips unchanged. 2 right approach thoracostomy tubes with distal tips in stable position.    Unchanged cardiomediastinal silhouette. Unchanged right basilar airspace opacities. No new focal airspace consolidation. No new pleural effusion. No visible pneumothorax. Osseous structures are unchanged.      Impression:      Impression:  No significant interval change.      Electronically Signed: Raulito Light MD    1/11/2024 8:44 AM EST    Workstation ID: [id number]            Assessment      Bronchogenic  cancer of right lung    Mixed hyperlipidemia    COPD (chronic obstructive pulmonary disease)    Mediastinal adenopathy    History of tobacco use, presenting hazards to health    Primary hypertension    GERD without esophagitis    No significant air leak today will place chest tubes to underwater seal and it is possible that could be removed tomorrow with discharge home    Plan   Chest tube to underwater seal  Lasix 40 mg IV x 1  Chest x-ray and BMP in the a.m.  Anticipate possible chest tube removal tomorrow    Please note that portions of this note were completed with a voice recognition program. Efforts were made to edit the dictations, but occasionally words are mistranscribed.    Joey Patel MD  01/12/24  07:32 EST

## 2024-01-12 NOTE — CASE MANAGEMENT/SOCIAL WORK
Continued Stay Note  [facility]     Patient Name: David Barfield  MRN: [id number]  Today's Date: 1/12/2024    Admit Date: 1/9/2024    Plan: Home   Discharge Plan       Row Name 01/12/24 1147       Plan    Plan Home    Plan Comments Discussed patient in MDR.  Patient still has CT to waterseal.  Currently wearing O2@2L, not on at home.  Spoke with patient and significant other in room.  His plan is still to return home at discharge.  CM will continue to follow.    Final Discharge Disposition Code 01 - home or self-care                   Discharge Codes    No documentation.                 Expected Discharge Date and Time       Expected Discharge Date Expected Discharge Time    Jan 14, 2024               Mabel So RN

## 2024-01-12 NOTE — PLAN OF CARE
Goal Outcome Evaluation:  Plan of Care Reviewed With: patient, spouse        Progress: improving  Outcome Evaluation: Pt amb in hallway with tripod walker and CGA x1 +1 for equip. Pt did not require rest break, denied pain, dyspnea, or dizziness. Pt dem increased independence with mobility this date.      Anticipated Discharge Disposition (PT): home with assist

## 2024-01-12 NOTE — PROGRESS NOTES
[facility]    Acute pain service Inpatient Progress Note    Patient Name: David Barfield  :  1950  MRN:  [id number]        Acute Pain  Service Inpatient Progress Note:    Analgesia:Excellent  Pain Score:0/10  LOC: alert and awake  Resp Status: supplemental oxygen  Cardiac: VS stable  Side Effects:None  Catheter Site:clean, dry and dressing intact  Cath type: Epidural cath(MOOG pump)  Catheter Plan:Catheter to remain Insitu and Continue catheter infusion rate unchanged  Comments: Morphine PF 0.1 mg/mL, bupivacaine (PF) (MARCAINE) 0.5 % 0.125 % in sodium chloride 0.9 % 200 mL epidural.  2ml/hr

## 2024-01-13 ENCOUNTER — APPOINTMENT (OUTPATIENT)
Dept: GENERAL RADIOLOGY | Facility: HOSPITAL | Age: 74
End: 2024-01-13
Payer: MEDICARE

## 2024-01-13 LAB
ANION GAP SERPL CALCULATED.3IONS-SCNC: 8 MMOL/L (ref 5–15)
BH BB BLOOD EXPIRATION DATE: NORMAL
BH BB BLOOD EXPIRATION DATE: NORMAL
BH BB BLOOD TYPE BARCODE: 600
BH BB BLOOD TYPE BARCODE: 600
BH BB DISPENSE STATUS: NORMAL
BH BB DISPENSE STATUS: NORMAL
BH BB PRODUCT CODE: NORMAL
BH BB PRODUCT CODE: NORMAL
BH BB UNIT NUMBER: NORMAL
BH BB UNIT NUMBER: NORMAL
BUN SERPL-MCNC: 21 MG/DL (ref 8–23)
BUN/CREAT SERPL: 17.9 (ref 7–25)
CALCIUM SPEC-SCNC: 9 MG/DL (ref 8.6–10.5)
CHLORIDE SERPL-SCNC: 94 MMOL/L (ref 98–107)
CO2 SERPL-SCNC: 30 MMOL/L (ref 22–29)
CREAT SERPL-MCNC: 1.17 MG/DL (ref 0.76–1.27)
CROSSMATCH INTERPRETATION: NORMAL
CROSSMATCH INTERPRETATION: NORMAL
DEPRECATED RDW RBC AUTO: 58.4 FL (ref 37–54)
EGFRCR SERPLBLD CKD-EPI 2021: 65.4 ML/MIN/1.73
ERYTHROCYTE [DISTWIDTH] IN BLOOD BY AUTOMATED COUNT: 16.2 % (ref 12.3–15.4)
GLUCOSE SERPL-MCNC: 114 MG/DL (ref 65–99)
HCT VFR BLD AUTO: 28.7 % (ref 37.5–51)
HGB BLD-MCNC: 9.4 G/DL (ref 13–17.7)
MCH RBC QN AUTO: 32.4 PG (ref 26.6–33)
MCHC RBC AUTO-ENTMCNC: 32.8 G/DL (ref 31.5–35.7)
MCV RBC AUTO: 99 FL (ref 79–97)
PLATELET # BLD AUTO: 250 10*3/MM3 (ref 140–450)
PMV BLD AUTO: 8.5 FL (ref 6–12)
POTASSIUM SERPL-SCNC: 4.5 MMOL/L (ref 3.5–5.2)
RBC # BLD AUTO: 2.9 10*6/MM3 (ref 4.14–5.8)
SODIUM SERPL-SCNC: 132 MMOL/L (ref 136–145)
UNIT  ABO: NORMAL
UNIT  ABO: NORMAL
UNIT  RH: NORMAL
UNIT  RH: NORMAL
WBC NRBC COR # BLD AUTO: 7.51 10*3/MM3 (ref 3.4–10.8)

## 2024-01-13 PROCEDURE — 99024 POSTOP FOLLOW-UP VISIT: CPT | Performed by: PHYSICIAN ASSISTANT

## 2024-01-13 PROCEDURE — 99232 SBSQ HOSP IP/OBS MODERATE 35: CPT | Performed by: INTERNAL MEDICINE

## 2024-01-13 PROCEDURE — 25010000002 HEPARIN (PORCINE) PER 1000 UNITS: Performed by: STUDENT IN AN ORGANIZED HEALTH CARE EDUCATION/TRAINING PROGRAM

## 2024-01-13 PROCEDURE — 71045 X-RAY EXAM CHEST 1 VIEW: CPT

## 2024-01-13 PROCEDURE — 94799 UNLISTED PULMONARY SVC/PX: CPT

## 2024-01-13 PROCEDURE — 85027 COMPLETE CBC AUTOMATED: CPT

## 2024-01-13 PROCEDURE — 99231 SBSQ HOSP IP/OBS SF/LOW 25: CPT | Performed by: INTERNAL MEDICINE

## 2024-01-13 PROCEDURE — 80048 BASIC METABOLIC PNL TOTAL CA: CPT

## 2024-01-13 RX ORDER — BISACODYL 5 MG/1
10 TABLET, DELAYED RELEASE ORAL ONCE
Status: COMPLETED | OUTPATIENT
Start: 2024-01-13 | End: 2024-01-13

## 2024-01-13 RX ORDER — AMOXICILLIN 250 MG
2 CAPSULE ORAL 2 TIMES DAILY PRN
Status: DISCONTINUED | OUTPATIENT
Start: 2024-01-13 | End: 2024-01-15 | Stop reason: HOSPADM

## 2024-01-13 RX ADMIN — SENNOSIDES AND DOCUSATE SODIUM 2 TABLET: 8.6; 5 TABLET ORAL at 21:13

## 2024-01-13 RX ADMIN — BISACODYL 10 MG: 5 TABLET, COATED ORAL at 09:44

## 2024-01-13 RX ADMIN — PRAVASTATIN SODIUM 80 MG: 40 TABLET ORAL at 21:13

## 2024-01-13 RX ADMIN — GABAPENTIN 100 MG: 100 CAPSULE ORAL at 21:13

## 2024-01-13 RX ADMIN — HEPARIN SODIUM 5000 UNITS: 5000 INJECTION INTRAVENOUS; SUBCUTANEOUS at 21:13

## 2024-01-13 RX ADMIN — GABAPENTIN 100 MG: 100 CAPSULE ORAL at 15:34

## 2024-01-13 RX ADMIN — TIOTROPIUM BROMIDE INHALATION SPRAY 2 PUFF: 3.12 SPRAY, METERED RESPIRATORY (INHALATION) at 08:32

## 2024-01-13 RX ADMIN — BUDESONIDE AND FORMOTEROL FUMARATE DIHYDRATE 2 PUFF: 160; 4.5 AEROSOL RESPIRATORY (INHALATION) at 08:32

## 2024-01-13 RX ADMIN — POLYETHYLENE GLYCOL 3350 17 G: 17 POWDER, FOR SOLUTION ORAL at 08:26

## 2024-01-13 RX ADMIN — ACETAMINOPHEN 1000 MG: 500 TABLET ORAL at 21:13

## 2024-01-13 RX ADMIN — OMEPRAZOLE 40 MG: 40 CAPSULE, DELAYED RELEASE ORAL at 08:25

## 2024-01-13 RX ADMIN — Medication 10 ML: at 21:14

## 2024-01-13 RX ADMIN — BUDESONIDE AND FORMOTEROL FUMARATE DIHYDRATE 2 PUFF: 160; 4.5 AEROSOL RESPIRATORY (INHALATION) at 20:56

## 2024-01-13 RX ADMIN — GABAPENTIN 100 MG: 100 CAPSULE ORAL at 08:26

## 2024-01-13 RX ADMIN — ACETAMINOPHEN 1000 MG: 500 TABLET ORAL at 13:05

## 2024-01-13 RX ADMIN — TAMSULOSIN HYDROCHLORIDE 0.4 MG: 0.4 CAPSULE ORAL at 08:26

## 2024-01-13 RX ADMIN — Medication: at 21:13

## 2024-01-13 RX ADMIN — Medication 10 ML: at 09:44

## 2024-01-13 RX ADMIN — HEPARIN SODIUM 5000 UNITS: 5000 INJECTION INTRAVENOUS; SUBCUTANEOUS at 08:26

## 2024-01-13 RX ADMIN — ACETAMINOPHEN 1000 MG: 500 TABLET ORAL at 06:19

## 2024-01-13 NOTE — PROGRESS NOTES
"HEMATOLOGY/ONCOLOGY PROGRESS NOTE    S: Met with patient and significant other this AM. No BM in 5 days. Intermittent chest wall pain controlled with PRN oxycodone, but makes him nauseated.  He is hoping chest tube to be removed soon.  Appetite is low but improving.  He has questions regarding pathology results and adjuvant therapy options.    Medications:  The current medication list was reviewed in the EMR    ALLERGIES:    Allergies   Allergen Reactions    Latex Other (See Comments)     Latex allergy     Tape Rash         Physical Exam    VITAL SIGNS:  BP 98/59 (BP Location: Right arm, Patient Position: Lying)   Pulse 70   Temp 98.3 °F (36.8 °C) (Oral)   Resp 18   Ht 182.9 cm (72.01\")   Wt 89.4 kg (197 lb)   SpO2 (!) 88%   BMI 26.71 kg/m²   Temp:  [98 °F (36.7 °C)-98.3 °F (36.8 °C)] 98.3 °F (36.8 °C)      Performance Status:                Physical Exam    General: well appearing, in no acute distress  HEENT: sclerae anicteric, neck is supple  Cardiovascular: regular rate and rhythm, no murmurs, rubs or gallops  Lungs: clear to auscultation anteriorly chest tube in place.  Abdomen: soft, nontender, nondistended.  Extremities: no lower extremity edema  Skin: no rashes, lesions, bruising, or petechiae  Psych: Mood is stable    RECENT LABS:    Lab Results   Component Value Date    HGB 10.1 (L) 01/12/2024    HCT 30.3 (L) 01/12/2024    MCV 99.0 (H) 01/12/2024     01/12/2024    WBC 9.65 01/12/2024    NEUTROABS 8.17 (H) 01/10/2024    LYMPHSABS 1.00 01/10/2024    MONOSABS 0.80 01/10/2024    EOSABS 0.00 01/10/2024    BASOSABS 0.01 01/10/2024       Lab Results   Component Value Date    GLUCOSE 109 (H) 01/12/2024    BUN 11 01/12/2024    CREATININE 0.81 01/12/2024     (L) 01/12/2024    K 5.0 01/12/2024    CL 97 (L) 01/12/2024    CO2 28.0 01/12/2024    CALCIUM 9.2 01/12/2024    PROTEINTOT 7.9 12/29/2023    ALBUMIN 4.0 12/29/2023    BILITOT 0.3 12/29/2023    ALKPHOS 99 12/29/2023    AST 15 12/29/2023    ALT " 11 12/29/2023     Final Diagnosis   LUNG, RIGHT LOWER LOBE, LOBECTOMY:  Adenosquamous carcinoma, 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   LYMPH NODE, RIGHT LEVEL 4, EXCISION:  1 benign lymph node  3.   LYMPH NODE, RIGHT LEVEL 7, EXCISION:  1 benign lymph node      Electronically signed by Kurt Sultana MD on 1/12/2024 at 1255   Comment    LUNG     SPECIMEN:   Procedure: Lobectomy  Specimen Laterality: Right     TUMOR:   Tumor Focality: Single focus  Tumor Site: Lower lobe of lung  Tumor Size:   Total Tumor Size (size of entire tumor): 1.4 cm  Size of Invasive Component: 1.4 cm  Histologic Type:   - Adenosquamous carcinoma  Histologic Grade: G2, moderately differentiated  Spread Through Air Spaces (DAMIAN): Not identified  Visceral Pleura Invasion: Not identified  Direct Invasion of Adjacent Structures: Not applicable (no adjacent structures present)  Treatment Effect:   - Present    Percentage of Residual Viable Tumor: 60 %    Percentage of Necrosis: 0 %    Percentage of Stroma (includes fibrosis and inflammation): 20 %  Lymphovascular Invasion: Not identified     MARGINS:   Margin Status for Invasive Carcinoma:   - All margins negative for invasive carcinoma    Closest Margin(s) to Invasive Carcinoma: Bronchial    Distance from Invasive Carcinoma to Closest Margin: 3 cm  Margin Status for Non-Invasive Tumor: All margins negative for non-invasive tumor     REGIONAL LYMPH NODES:   Lymph Node(s) from Prior Procedures:   - Included    Prior Lymph Node Procedure(s) Included: ([id number])  3.  LYMPH NODE, STATION 11 L, TRANSBRONCHIAL NEEDLE ASPIRATION:  Portions of anthracotic lymph node with no tumor or granuloma identified.     4.  LYMPH NODE, STATION 4L, TRANSBRONCHIAL NEEDLE ASPIRATION:  Portions of anthracotic lymph node with no tumor or granuloma identified.     5.  LYMPH NODE, STATION 7, TRANSBRONCHIAL NEEDLE ASPIRATION:  Non-small  cell carcinoma (see comment).     Regional Lymph Node Status:   All regional lymph nodes negative for tumor  Number of Lymph Nodes Examined:   - 7    Carloz Site(s) Examined: 4R: Lower paratracheal, 12R: Lobar, 7: Subcarinal     PATHOLOGIC STAGE CLASSIFICATION (pTNM, AJCC 8th Edition):   The suffix m (or a specific number) should only be used in the setting of multifocal ground-glass / lepidic nodules that histologically present as adenocarcinomas with prominent lepidic component or multifocal tumors of same histologic type that are too numerous for individual separate synoptic report and that are not better classified as intrapulmonary metastases (e.g. numerous carcinoid tumors). Multiple primary lung cancers showing different histologic type or different morphology based on comprehensive histologic subtyping are better staged as independent tumors without m suffix.  TNM Descriptors: y (post-treatment)  pT Category: pT1b  pN Category: pN0     ADDITIONAL FINDINGS:   Additional Findings: None identified     Due to the unusual staining profile of the original biopsy, the specimen is restained with immunohistochemical stains with good controls.  The tumor shows definite coexpression of p40 and TTF-1, consistent with an adenosquamous carcinoma.          Assessment/Plan  ASSESSMENT/PLAN:  Nonsmall cell lung cancer of the RLL, Stage IIIA status post neoadjuvant chemoimmunotherapy and is now admitted for planned lobectomy and mediastinal dissection.     -I reviewed his thoracic surgery notes and I reviewed his pathology results in detail with the patient and his spouse.  He had a nice partial response to chemoimmunotherapy.  All sampled lymph nodes were benign.  Margins were negative.  -He is recovering well from surgical resection.  -Reviewed anticipated timeline for pathology results and need to await these results for further adjuvant therapy planning.  I have a follow-up scheduled with the patient on 1/23/2024  anticipate we will review results and make final adjuvant therapy plans at that time.  -Appreciate thoracic surgery and hospitalist team care  -I will present his case at multidisciplinary tumor board for final consensus recommendations, but anticipate proceeding with adjuvant immunotherapy.  -The patient has follow-up in my office in 2 weeks.    Postoperative constipation  -I increased his senna Colace to twice daily as needed instead of nightly as needed.  He also has MiraLAX available.     COPD  -Weaning oxygen as tolerated.     Neetu Ashley MD  [facility] Hematology and Oncology    1/13/2024     - - -

## 2024-01-13 NOTE — PROGRESS NOTES
[facility] Medicine Services  PROGRESS NOTE    Patient Name: David Barfield  : 1950  MRN: [id number]    Date of Admission: 2024  Primary Care Physician: No primary care provider on file.    Subjective   Subjective     CC:  Right lobectomy     HPI:  Patient still having significant pain, currently on a PCA.  Needs to have a bowel movement.  Wife requesting bisacodyl.  Denies any shortness of breath.      Objective   Objective     Vital Signs:   Temp:  [98 °F (36.7 °C)-98.3 °F (36.8 °C)] 98.3 °F (36.8 °C)  Heart Rate:  [70-71] 70  Resp:  [18] 18  BP: ()/(56-71) 90/56  Flow (L/min):  [2] 2     Physical Exam:  Constitutional: No acute distress, awake, alert  Respiratory: Clear to auscultation bilaterally, chest tube in place on the lateral right  Cardiovascular: RRR, no murmurs, rubs, or gallops  Gastrointestinal: Positive bowel sounds, soft, nontender, nondistended  Musculoskeletal: No bilateral ankle edema  Psychiatric: Appropriate affect, cooperative  Neurologic: Oriented x 3, no gross focal neurological deficits      Results Reviewed:  LAB RESULTS:      Lab 24  0810 01/12/24  0509 01/11/24  0721 01/10/24  0421   WBC 7.51 9.65  --  10.02   HEMOGLOBIN 9.4* 10.1* 10.2* 11.2*   HEMATOCRIT 28.7* 30.3* 31.1* 34.7*   PLATELETS 250 246  --  280   NEUTROS ABS  --   --   --  8.17*   IMMATURE GRANS (ABS)  --   --   --  0.04   LYMPHS ABS  --   --   --  1.00   MONOS ABS  --   --   --  0.80   EOS ABS  --   --   --  0.00   MCV 99.0* 99.0*  --  98.3*         Lab 24  0810 24  0509 01/11/24  0721 01/10/24  0421   SODIUM 132* 133* 134* 138   POTASSIUM 4.5 5.0 4.7 5.2   CHLORIDE 94* 97* 97* 103   CO2 30.0* 28.0 30.0* 24.0   ANION GAP 8.0 8.0 7.0 11.0   BUN 21 11 10 11   CREATININE 1.17 0.81 0.85 1.02   EGFR 65.4 92.5 91.2 77.1   GLUCOSE 114* 109* 110* 140*   CALCIUM 9.0 9.2 8.8 9.0                     Lab 24  0930   ABO TYPING A   RH TYPING Negative   ANTIBODY  SCREEN Negative         Brief Urine Lab Results       None            Microbiology Results Abnormal       None            XR Chest 1 View    Result Date: 1/13/2024  XR CHEST 1 VW Date of Exam: 1/13/2024 1:47 AM EST Indication: postop right thoracotomy Comparison: 1/12/2024 at 0842 hours Findings: The right chest tubes are stable in position. Small residual pneumothorax is noted. There is atelectasis within the right lower lung. Atelectasis is also noted in the left lung base. There may be a small left pleural effusion. These findings are stable. The cardiac silhouette and mediastinum are stable. A left portacatheter is stable in position. A right cardiac pacemaker/AICD is noted with leads intact..     Impression: Impression: 1.Stable positioning of the right chest tubes. Small residual pneumothorax is seen on the right. 2.Residual atelectasis is noted in the right lower lung. There is also atelectasis within the left lung base. A small left pleural effusion is seen. These findings are stable. Electronically Signed: Gabino Ruiz MD  1/13/2024 8:28 AM EST  Workstation ID: [id number]    XR Chest 1 View    Result Date: 1/12/2024  XR CHEST 1 VW Date of Exam: 1/12/2024 7:38 AM CST Indication: dyspnea Comparison: 1/12/2024 at 3:18 a.m. Findings: Cardiomediastinal silhouette and support lines and tubes are unchanged. Stable right lung volume loss with basilar airspace disease and potential small subpulmonic pneumothorax. Left basilar airspace disease is unchanged. No acute osseous abnormality identified.     Impression: Impression: 1.Probable small right subpulmonic pneumothorax. No significant change otherwise.. Electronically Signed: Bertrand Gongora MD  1/12/2024 8:01 AM CST  Workstation ID: [id number]    XR Chest 1 View    Result Date: 1/12/2024  XR CHEST 1 VW Date of Exam: 1/12/2024 3:03 AM EST Indication: postop right thoracotomy Comparison: 1/11/2024 Findings: 2 right-sided chest tubes are in stable position. No discrete  pneumothorax. Volume loss with left right shift of the heart and mediastinum. Left-sided port catheter stable. AICD noted. Mild bibasilar atelectasis. No significant effusion.     Impression: Impression: 1. Stable right-sided chest tubes. No discrete pneumothorax. 2. Stable volume loss with left to right shift of the heart and mediastinum. 3. Mild bibasilar atelectasis. Electronically Signed: Aldo Handley MD  1/12/2024 7:40 AM EST  Workstation ID: [id number]     Results for orders placed during the hospital encounter of 07/09/21    Adult Transthoracic Echo Complete W/ Cont if Necessary Per Protocol    Interpretation Summary  · Estimated left ventricular EF = 55% Left ventricular systolic function is normal.  · Left ventricular diastolic function was normal.  · Left ventricular wall thickness is consistent with mild concentric hypertrophy.  · Trace mitral and tricuspid regurgitation.      Current medications:  Scheduled Meds:Pharmacy Consult, , Does not apply, BID  omeprazole, 40 mg, Oral, Daily  acetaminophen, 1,000 mg, Oral, Q8H  budesonide-formoterol, 2 puff, Inhalation, BID - RT  gabapentin, 100 mg, Oral, TID  heparin (porcine), 5,000 Units, Subcutaneous, Q12H  Pharmacy Meds to Bed Consult, , Does not apply, Daily  polyethylene glycol, 17 g, Oral, Daily  pravastatin, 80 mg, Oral, Nightly  sodium chloride, 10 mL, Intravenous, Q12H  tamsulosin, 0.4 mg, Oral, Daily  tiotropium bromide monohydrate, 2 puff, Inhalation, Daily - RT      Continuous Infusions:Morphine PF 0.1 mg/mL, bupivacaine (PF) (MARCAINE) 0.125 % in sodium chloride 0.9 % 200 mL epidural,       PRN Meds:.  albuterol    bisacodyl    calcium carbonate    HYDROmorphone    ketorolac    lisinopril    nalbuphine    naloxone    nitroglycerin    ondansetron ODT **OR** ondansetron    ondansetron    oxyCODONE    oxyCODONE-acetaminophen    senna-docusate sodium    sodium chloride    sodium chloride    Assessment & Plan   Assessment & Plan     [facility]  Problems    Diagnosis  POA    **Bronchogenic cancer of right lung [C34.91]  Yes    Primary hypertension [I10]  Clinically Undetermined    GERD without esophagitis [K21.9]  Yes    COPD (chronic obstructive pulmonary disease) [J44.9]  Yes    Mediastinal adenopathy [R59.0]  Yes    History of tobacco use, presenting hazards to health [Z87.891]  Not Applicable    Mixed hyperlipidemia [E78.2]  Yes      Resolved Hospital Problems    Diagnosis Date Resolved POA    Lung cancer [C34.90] 01/09/2024 Yes        Brief Hospital Course to date:  David Barfield is a 73 y.o. male w/ non-small cell lung cancer of RLL (stage IIIA) s/p neoadjuvant systemic therapy, s/p planned right lower lobectomy & mediastinal dissection. Transferred out of icu to telemetry on 1/11/24 and hospitalists asked to medically co-manage     Right Lung CA, s/p right lower lobectomy  --S/p right thoracotomy, right lower lobectomy, mediastinal lymph node dissection (lymph nodes level 4, 7 and 8) done on 1/9/2024 by Dr. Patel.   - Post-op care per CT surgery.   -pain control per ct surgery, currently on a PCA  -home soon when ok w/ surgery  -Oncology following.  Ample lymph nodes were benign, margins were negative awaiting biopsy results for further adjuvant therapy planning.  Follow-up scheduled with on 1/23/2024.     Pre-DM  -A1c 6.1  -diabetic diet  -not requiring medical treatment currently  -follow up w/ pcp     Constipation  -Start bisacodyl per request     COPD  -- Continue Spiriva and Symbicort inhalers; stable     GERD  --continue Prilosec     HTN, HLD  -continue lisinopril and pravastatin       Expected Discharge Location and Transportation: home  Expected Discharge   Expected Discharge Date: 1/16/2024; Expected Discharge Time:      DVT prophylaxis:  Medical and mechanical DVT prophylaxis orders are present.     AM-PAC 6 Clicks Score (PT): 21 (01/13/24 5554)    CODE STATUS:   Code Status and Medical Interventions:   Ordered at: 01/09/24 7124      Code Status (Patient has no pulse and is not breathing):    CPR (Attempt to Resuscitate)     Medical Interventions (Patient has pulse or is breathing):    Full Support     This patient's problems and plans were partially entered by my partner and updated as appropriate by me 01/13/24. Today is my first day evaluating this patient's active medical problems. I Personally reviewed chart and adjusted note to reflect daily changes in management/clinical condition. Copied text in this note has been reviewed and is accurate as of  01/13/24      Sandra Sinclair MD  01/13/24

## 2024-01-13 NOTE — PROGRESS NOTES
4 Days Post-Op right thoracotomy right lower lobectomy with mediastinal lymph node dissections and lymph node levels 4 7 and 8       LOS: 4 days   Patient Care Team:  Maria L Hernandez, RN as Nurse Navigator  Octaviano Sampson MD as Consulting Physician (Pulmonary Disease)  Neetu Ashley MD as Referring Physician (Hematology and Oncology)  Nimo Rodríguez MD as Consulting Physician (Radiation Oncology)    Chief complaint:    Subjective   Denies chest pain, denies shortness of breath    Objective    Vital Signs  Temp:  [98 °F (36.7 °C)-98.3 °F (36.8 °C)] 98.3 °F (36.8 °C)  Heart Rate:  [70-71] 70  Resp:  [18] 18  BP: ()/(59-71) 98/59    Physical Exam:   General Appearance: alert, appears stated age and cooperative   Lungs: clear bilaterally   Heart: Regular rate and rhythm   Skin:  Incision c/d/i     Results     Results from last 7 days   Lab Units 01/13/24  0810   WBC 10*3/mm3 7.51   HEMOGLOBIN g/dL 9.4*   HEMATOCRIT % 28.7*   PLATELETS 10*3/mm3 250     Results from last 7 days   Lab Units 01/12/24  0509   SODIUM mmol/L 133*   POTASSIUM mmol/L 5.0   CHLORIDE mmol/L 97*   CO2 mmol/L 28.0   BUN mg/dL 11   CREATININE mg/dL 0.81   GLUCOSE mg/dL 109*   CALCIUM mg/dL 9.2               Assessment      Bronchogenic cancer of right lung    Mixed hyperlipidemia    COPD (chronic obstructive pulmonary disease)    Mediastinal adenopathy    History of tobacco use, presenting hazards to health    Primary hypertension    GERD without esophagitis        Plan   Await radiology read on x-ray  May need chest tube placed back to suction for 24 hours  Appears to be a basilar pneumo with bilateral atelectasis      To Aguilar PA-C  01/13/24  08:25 EST

## 2024-01-13 NOTE — PROGRESS NOTES
[facility]    Acute pain service Inpatient Progress Note    Patient Name: David Barfield  :  1950  MRN:  [id number]        Acute Pain  Service Inpatient Progress Note:    Analgesia:Excellent  Pain Score:1/10  LOC: alert and awake  Resp Status: room air  Cardiac: VS stable  Side Effects:None  Catheter Site:clean, dry and dressing intact  Cath type: Epidural cath(MOOG pump)  Volume: morphine 0.1 mg/ml + marcaine 0.5% 2 ml/hr + 1 ml q 15 min pca.  Catheter Plan:Catheter to remain Insitu and Continue catheter infusion rate unchanged

## 2024-01-14 ENCOUNTER — APPOINTMENT (OUTPATIENT)
Dept: GENERAL RADIOLOGY | Facility: HOSPITAL | Age: 74
End: 2024-01-14
Payer: MEDICARE

## 2024-01-14 LAB
ANION GAP SERPL CALCULATED.3IONS-SCNC: 6 MMOL/L (ref 5–15)
BUN SERPL-MCNC: 15 MG/DL (ref 8–23)
BUN/CREAT SERPL: 19.2 (ref 7–25)
CALCIUM SPEC-SCNC: 8.8 MG/DL (ref 8.6–10.5)
CHLORIDE SERPL-SCNC: 96 MMOL/L (ref 98–107)
CO2 SERPL-SCNC: 31 MMOL/L (ref 22–29)
CREAT SERPL-MCNC: 0.78 MG/DL (ref 0.76–1.27)
DEPRECATED RDW RBC AUTO: 57.5 FL (ref 37–54)
EGFRCR SERPLBLD CKD-EPI 2021: 93.6 ML/MIN/1.73
ERYTHROCYTE [DISTWIDTH] IN BLOOD BY AUTOMATED COUNT: 16.2 % (ref 12.3–15.4)
GLUCOSE SERPL-MCNC: 119 MG/DL (ref 65–99)
HCT VFR BLD AUTO: 28.9 % (ref 37.5–51)
HGB BLD-MCNC: 9.6 G/DL (ref 13–17.7)
MCH RBC QN AUTO: 32.4 PG (ref 26.6–33)
MCHC RBC AUTO-ENTMCNC: 33.2 G/DL (ref 31.5–35.7)
MCV RBC AUTO: 97.6 FL (ref 79–97)
PLATELET # BLD AUTO: 280 10*3/MM3 (ref 140–450)
PMV BLD AUTO: 8.6 FL (ref 6–12)
POTASSIUM SERPL-SCNC: 4.9 MMOL/L (ref 3.5–5.2)
RBC # BLD AUTO: 2.96 10*6/MM3 (ref 4.14–5.8)
SODIUM SERPL-SCNC: 133 MMOL/L (ref 136–145)
WBC NRBC COR # BLD AUTO: 7.15 10*3/MM3 (ref 3.4–10.8)

## 2024-01-14 PROCEDURE — 94799 UNLISTED PULMONARY SVC/PX: CPT

## 2024-01-14 PROCEDURE — 71045 X-RAY EXAM CHEST 1 VIEW: CPT

## 2024-01-14 PROCEDURE — 99024 POSTOP FOLLOW-UP VISIT: CPT | Performed by: PHYSICIAN ASSISTANT

## 2024-01-14 PROCEDURE — 85027 COMPLETE CBC AUTOMATED: CPT

## 2024-01-14 PROCEDURE — 25010000002 HEPARIN (PORCINE) PER 1000 UNITS: Performed by: STUDENT IN AN ORGANIZED HEALTH CARE EDUCATION/TRAINING PROGRAM

## 2024-01-14 PROCEDURE — 80048 BASIC METABOLIC PNL TOTAL CA: CPT

## 2024-01-14 RX ORDER — SODIUM PHOSPHATE,MONO-DIBASIC 19G-7G/118
1 ENEMA (ML) RECTAL ONCE AS NEEDED
Status: DISCONTINUED | OUTPATIENT
Start: 2024-01-14 | End: 2024-01-15 | Stop reason: HOSPADM

## 2024-01-14 RX ORDER — TAMSULOSIN HYDROCHLORIDE 0.4 MG/1
0.4 CAPSULE ORAL DAILY
Qty: 30 CAPSULE | Refills: 6 | OUTPATIENT
Start: 2024-01-15

## 2024-01-14 RX ADMIN — BUDESONIDE AND FORMOTEROL FUMARATE DIHYDRATE 2 PUFF: 160; 4.5 AEROSOL RESPIRATORY (INHALATION) at 10:12

## 2024-01-14 RX ADMIN — ACETAMINOPHEN 1000 MG: 500 TABLET ORAL at 06:04

## 2024-01-14 RX ADMIN — TAMSULOSIN HYDROCHLORIDE 0.4 MG: 0.4 CAPSULE ORAL at 09:40

## 2024-01-14 RX ADMIN — TIOTROPIUM BROMIDE INHALATION SPRAY 2 PUFF: 3.12 SPRAY, METERED RESPIRATORY (INHALATION) at 10:12

## 2024-01-14 RX ADMIN — Medication 10 ML: at 20:56

## 2024-01-14 RX ADMIN — GABAPENTIN 100 MG: 100 CAPSULE ORAL at 16:37

## 2024-01-14 RX ADMIN — GABAPENTIN 100 MG: 100 CAPSULE ORAL at 20:55

## 2024-01-14 RX ADMIN — POLYETHYLENE GLYCOL 3350 17 G: 17 POWDER, FOR SOLUTION ORAL at 09:40

## 2024-01-14 RX ADMIN — PRAVASTATIN SODIUM 80 MG: 40 TABLET ORAL at 20:55

## 2024-01-14 RX ADMIN — BUDESONIDE AND FORMOTEROL FUMARATE DIHYDRATE 2 PUFF: 160; 4.5 AEROSOL RESPIRATORY (INHALATION) at 20:27

## 2024-01-14 RX ADMIN — Medication 10 ML: at 09:43

## 2024-01-14 RX ADMIN — ACETAMINOPHEN 1000 MG: 500 TABLET ORAL at 20:55

## 2024-01-14 RX ADMIN — OMEPRAZOLE 40 MG: 40 CAPSULE, DELAYED RELEASE ORAL at 09:42

## 2024-01-14 RX ADMIN — BISACODYL 10 MG: 10 SUPPOSITORY RECTAL at 16:37

## 2024-01-14 RX ADMIN — ACETAMINOPHEN 1000 MG: 500 TABLET ORAL at 16:37

## 2024-01-14 RX ADMIN — HEPARIN SODIUM 5000 UNITS: 5000 INJECTION INTRAVENOUS; SUBCUTANEOUS at 20:55

## 2024-01-14 RX ADMIN — HEPARIN SODIUM 5000 UNITS: 5000 INJECTION INTRAVENOUS; SUBCUTANEOUS at 09:40

## 2024-01-14 RX ADMIN — GABAPENTIN 100 MG: 100 CAPSULE ORAL at 09:40

## 2024-01-14 NOTE — PROGRESS NOTES
"    [facility] Medicine Services  CLINICAL REVIEW NOTE    Patient Name: David Barfield  : 1950  MRN: [id number]    Date of Admission: 2024  Length of Stay: 5  Attending Service:  CTS    David Barfield is a 73 y.o. male w/ non-small cell lung cancer of RLL (stage IIIA) s/p neoadjuvant systemic therapy, s/p planned right lower lobectomy & mediastinal dissection. Transferred out of icu to telemetry on 24 and hospitalists asked to medically co-manage     Right Lung CA, s/p right lower lobectomy  --S/p right thoracotomy, right lower lobectomy, mediastinal lymph node dissection (lymph nodes level 4, 7 and 8) done on 2024 by Dr. Patel.   - Post-op care per CT surgery.   -pain control per ct surgery/ acute pain service, currently on a PCA  -home soon when ok w/ surgery  -Oncology following.  Ample lymph nodes were benign, margins were negative awaiting biopsy results for further adjuvant therapy planning.  Follow-up scheduled with on 2024.    Hyponatremia  - stable, not clinically significant    Postoperative Anemia  - Hg was 11.2 on presentation, trended down following surgery     Pre-DM  -A1c 6.1  -diabetic diet  -not requiring medical treatment currently  -follow up w/ pcp     Constipation  -Bisacodyl, sennakot, miralax     COPD  -- Continue Spiriva and Symbicort inhalers; stable     GERD  --continue Prilosec     HTN, HLD  -continue lisinopril and pravastatin        Expected Discharge Location and Transportation: home    Patient's labs, charting, and events reviewed.  No active medical management issues to address today, the Hospitalist team will continue to follow daily, be available for any needs, and see or bill for services as needed.      if \"pending\" use the bhesig process      "

## 2024-01-14 NOTE — PROGRESS NOTES
[facility]    Acute pain service Inpatient Progress Note    Patient Name: David Barfield  :  1950  MRN:  [id number]        Acute Pain  Service Inpatient Progress Note:    Analgesia:Good  LOC: alert and awake  Resp Status: spontaneous ventilation  Cardiac: VS stable  Side Effects:None  Catheter Site:clean, dry and dressing intact  Cath type: Epidural cath(MOOG pump)  Catheter Plan:Catheter removed and tip intact  Comments: Patient sitting up in bed, resting in NAD.  Appears comfortable, no acute distress.  Epidural removed uneventfully.  Encouraged PRN pain Rx use when needed.

## 2024-01-14 NOTE — PROGRESS NOTES
"    5 Days Post-Op right thoracotomy right lower lobectomy with mediastinal lymph node dissections and lymph node levels 4 7 and 8       LOS: 5 days   Patient Care Team:  Maria L Hernandez RN as Nurse Navigator  Octaviano Sampson MD as Consulting Physician (Pulmonary Disease)  Neetu Ashley MD as Referring Physician (Hematology and Oncology)  Nimo Rodríguez MD as Consulting Physician (Radiation Oncology)    Chief complaint:    Subjective   Denies chest pain, denies shortness of breath    Objective    Vital Signs  Temp:  [98 °F (36.7 °C)-98.8 °F (37.1 °C)] 98.6 °F (37 °C)  Heart Rate:  [70-76] 70  Resp:  [16-22] 22  BP: ()/(64-76) 128/76    Physical Exam:   General Appearance: alert, appears stated age and cooperative   Lungs: clear bilaterally   Heart: Regular rate and rhythm   Skin:  Incision c/d/i     Results 220 ml ct drainage      Results from last 7 days   Lab Units 01/14/24  0743   WBC 10*3/mm3 7.15   HEMOGLOBIN g/dL 9.6*   HEMATOCRIT % 28.9*   PLATELETS 10*3/mm3 280     Results from last 7 days   Lab Units 01/14/24  0743   SODIUM mmol/L 133*   POTASSIUM mmol/L 4.9   CHLORIDE mmol/L 96*   CO2 mmol/L 31.0*   BUN mg/dL 15   CREATININE mg/dL 0.78   GLUCOSE mg/dL 119*   CALCIUM mg/dL 8.8               Assessment      Bronchogenic cancer of right lung    Mixed hyperlipidemia    COPD (chronic obstructive pulmonary disease)    Mediastinal adenopathy    History of tobacco use, presenting hazards to health    Primary hypertension    GERD without esophagitis        Plan   Xray better   Will place CT to water seal    Addendum  \" Chest tubes however he has intermittent air leak that I did not see earlier this morning and it is has a leak now  Place chest tube to waterseal at midnight    To Aguilar PA-C  01/14/24  14:45 EST  "

## 2024-01-15 ENCOUNTER — APPOINTMENT (OUTPATIENT)
Dept: GENERAL RADIOLOGY | Facility: HOSPITAL | Age: 74
End: 2024-01-15
Payer: MEDICARE

## 2024-01-15 ENCOUNTER — READMISSION MANAGEMENT (OUTPATIENT)
Dept: CALL CENTER | Facility: HOSPITAL | Age: 74
End: 2024-01-15
Payer: MEDICARE

## 2024-01-15 VITALS
DIASTOLIC BLOOD PRESSURE: 68 MMHG | RESPIRATION RATE: 18 BRPM | HEIGHT: 72 IN | WEIGHT: 197 LBS | OXYGEN SATURATION: 98 % | HEART RATE: 71 BPM | TEMPERATURE: 97.7 F | BODY MASS INDEX: 26.68 KG/M2 | SYSTOLIC BLOOD PRESSURE: 133 MMHG

## 2024-01-15 LAB
ANION GAP SERPL CALCULATED.3IONS-SCNC: 9 MMOL/L (ref 5–15)
BUN SERPL-MCNC: 10 MG/DL (ref 8–23)
BUN/CREAT SERPL: 12 (ref 7–25)
CALCIUM SPEC-SCNC: 8.8 MG/DL (ref 8.6–10.5)
CHLORIDE SERPL-SCNC: 97 MMOL/L (ref 98–107)
CO2 SERPL-SCNC: 29 MMOL/L (ref 22–29)
CREAT SERPL-MCNC: 0.83 MG/DL (ref 0.76–1.27)
DEPRECATED RDW RBC AUTO: 58.9 FL (ref 37–54)
EGFRCR SERPLBLD CKD-EPI 2021: 91.8 ML/MIN/1.73
ERYTHROCYTE [DISTWIDTH] IN BLOOD BY AUTOMATED COUNT: 16.4 % (ref 12.3–15.4)
GLUCOSE SERPL-MCNC: 110 MG/DL (ref 65–99)
HCT VFR BLD AUTO: 30.1 % (ref 37.5–51)
HGB BLD-MCNC: 9.9 G/DL (ref 13–17.7)
MCH RBC QN AUTO: 32.5 PG (ref 26.6–33)
MCHC RBC AUTO-ENTMCNC: 32.9 G/DL (ref 31.5–35.7)
MCV RBC AUTO: 98.7 FL (ref 79–97)
PLATELET # BLD AUTO: 291 10*3/MM3 (ref 140–450)
PMV BLD AUTO: 8.5 FL (ref 6–12)
POTASSIUM SERPL-SCNC: 4.3 MMOL/L (ref 3.5–5.2)
RBC # BLD AUTO: 3.05 10*6/MM3 (ref 4.14–5.8)
SODIUM SERPL-SCNC: 135 MMOL/L (ref 136–145)
WBC NRBC COR # BLD AUTO: 6.85 10*3/MM3 (ref 3.4–10.8)

## 2024-01-15 PROCEDURE — 97530 THERAPEUTIC ACTIVITIES: CPT

## 2024-01-15 PROCEDURE — 71045 X-RAY EXAM CHEST 1 VIEW: CPT

## 2024-01-15 PROCEDURE — 94664 DEMO&/EVAL PT USE INHALER: CPT

## 2024-01-15 PROCEDURE — 85027 COMPLETE CBC AUTOMATED: CPT

## 2024-01-15 PROCEDURE — 80048 BASIC METABOLIC PNL TOTAL CA: CPT

## 2024-01-15 PROCEDURE — 94799 UNLISTED PULMONARY SVC/PX: CPT

## 2024-01-15 PROCEDURE — 25010000002 HEPARIN (PORCINE) PER 1000 UNITS: Performed by: STUDENT IN AN ORGANIZED HEALTH CARE EDUCATION/TRAINING PROGRAM

## 2024-01-15 PROCEDURE — 99024 POSTOP FOLLOW-UP VISIT: CPT | Performed by: THORACIC SURGERY (CARDIOTHORACIC VASCULAR SURGERY)

## 2024-01-15 PROCEDURE — 97116 GAIT TRAINING THERAPY: CPT

## 2024-01-15 RX ORDER — TAMSULOSIN HYDROCHLORIDE 0.4 MG/1
0.4 CAPSULE ORAL DAILY
Qty: 30 CAPSULE | Refills: 1 | Status: SHIPPED | OUTPATIENT
Start: 2024-01-16

## 2024-01-15 RX ADMIN — Medication 10 ML: at 09:25

## 2024-01-15 RX ADMIN — TIOTROPIUM BROMIDE INHALATION SPRAY 2 PUFF: 3.12 SPRAY, METERED RESPIRATORY (INHALATION) at 09:36

## 2024-01-15 RX ADMIN — GABAPENTIN 100 MG: 100 CAPSULE ORAL at 09:23

## 2024-01-15 RX ADMIN — BUDESONIDE AND FORMOTEROL FUMARATE DIHYDRATE 2 PUFF: 160; 4.5 AEROSOL RESPIRATORY (INHALATION) at 09:36

## 2024-01-15 RX ADMIN — HEPARIN SODIUM 5000 UNITS: 5000 INJECTION INTRAVENOUS; SUBCUTANEOUS at 09:23

## 2024-01-15 RX ADMIN — ACETAMINOPHEN 1000 MG: 500 TABLET ORAL at 06:12

## 2024-01-15 RX ADMIN — TAMSULOSIN HYDROCHLORIDE 0.4 MG: 0.4 CAPSULE ORAL at 09:23

## 2024-01-15 RX ADMIN — OMEPRAZOLE 40 MG: 40 CAPSULE, DELAYED RELEASE ORAL at 09:24

## 2024-01-15 NOTE — OUTREACH NOTE
Prep Survey      Flowsheet Row Responses   [facility], [place] patient discharged from? [place]   Is LACE score < 7 ? No   Eligibility [facility]   Date of Admission 01/09/24   Date of Discharge 01/15/24   Discharge Disposition Home or Self Care   Discharge diagnosis Bronchogenic cancer of right lung- THORACOTOMY FOR LOWER LOBECTOMY AND MEDISTINAL LYMPH NODE DISSECTION RIGHT   Does the patient have one of the following disease processes/diagnoses(primary or secondary)? Cardiothoracic surgery   Does the patient have Home health ordered? No   Is there a DME ordered? No   Prep survey completed? Yes            Malini MCDONALD - Registered Nurse

## 2024-01-15 NOTE — PROGRESS NOTES
CTS Progress Note       LOS: 6 days   Patient Care Team:  Maria L Hernandez RN as Nurse Navigator  Octaviano Sampson MD as Consulting Physician (Pulmonary Disease)  Neetu Ashley MD as Referring Physician (Hematology and Oncology)  Nimo Rodríguez MD as Consulting Physician (Radiation Oncology)    Chief Complaint: Bronchogenic cancer of right lung    Vital Signs:  Temp:  [97.7 °F (36.5 °C)-98.6 °F (37 °C)] 97.7 °F (36.5 °C)  Heart Rate:  [70-72] 71  Resp:  [18-22] 18  BP: (127-134)/(68-89) 133/68    Physical Exam:  Breathing unlabored on room air     Results:     Results from last 7 days   Lab Units 01/15/24  0432   WBC 10*3/mm3 6.85   HEMOGLOBIN g/dL 9.9*   HEMATOCRIT % 30.1*   PLATELETS 10*3/mm3 291     Results from last 7 days   Lab Units 01/15/24  0432   SODIUM mmol/L 135*   POTASSIUM mmol/L 4.3   CHLORIDE mmol/L 97*   CO2 mmol/L 29.0   BUN mg/dL 10   CREATININE mg/dL 0.83   GLUCOSE mg/dL 110*   CALCIUM mg/dL 8.8           Imaging Results (Last 24 Hours)       Procedure Component Value Units Date/Time    XR Chest 1 View [[id number]] Resulted: 01/15/24 0343     Updated: 01/15/24 0455    XR Chest 1 View [[id number]] Collected: 01/14/24 0852     Updated: 01/14/24 0857    Narrative:      XR CHEST 1 VW    Date of Exam: 1/14/2024 5:24 AM EST    Indication: postop right thoracotomy    Comparison: 1/13/2024    Findings:  Right-sided dual-lead pacemaker, left-sided Port-A-Cath, and right thoracotomy tubes are again noted. Minimal apical and basilar pneumothorax is again seen and appears stable. There is minimal remaining bibasilar atelectasis. No new pulmonary parenchymal   disease is seen. Heart and vasculature appear normal in size.          Impression:      Impression:    1. Trace remaining right pneumothorax.    2. Minimal remaining bibasilar atelectasis; no new chest disease is seen      Electronically Signed: Baljeet Carlos MD    1/14/2024 8:54 AM EST    Workstation ID: [id number]            Assessment      Bronchogenic  cancer of right lung    Mixed hyperlipidemia    COPD (chronic obstructive pulmonary disease)    Mediastinal adenopathy    History of tobacco use, presenting hazards to health    Primary hypertension    GERD without esophagitis    Should be able to discontinue both chest tubes this morning    Plan   Discontinue chest tubes this a.m.    Please note that portions of this note were completed with a voice recognition program. Efforts were made to edit the dictations, but occasionally words are mistranscribed.    Joey Patel MD  01/15/24  07:11 EST

## 2024-01-15 NOTE — THERAPY TREATMENT NOTE
Patient Name: David Barfield  : 1950    MRN: [id number]                              Today's Date: 1/15/2024       Admit Date: 2024    Visit Dx:     ICD-10-CM ICD-9-CM   1. Nodule of lower lobe of right lung  R91.1 793.11   2. Bronchogenic cancer of right lung  C34.91 162.9     Patient Active Problem List   Diagnosis    High degree atrioventricular block    Bradycardia, sinus    Chronic hypotension    PVC's (premature ventricular contractions)    Presence of cardiac pacemaker    Mixed hyperlipidemia    COPD (chronic obstructive pulmonary disease)    Nodule of lower lobe of right lung    Mediastinal adenopathy    Cough    History of tobacco use, presenting hazards to health    Bronchogenic cancer of right lung    Malignant neoplasm of lower lobe of right lung    Primary hypertension    GERD without esophagitis     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    Diabetes mellitus Borderline    Emphysema/COPD     Erectile disorder     GERD (gastroesophageal reflux disease)     History of chemotherapy     Hyperlipidemia     Hypertension 2019    Lung nodule     Mumps     Mumps     Pruritus     after bath    Slow to wake up after anesthesia     Wears dentures     upper only    Wears hearing aid in both ears     usually only wears right     Past Surgical History:   Procedure Laterality Date    BONE BIOPSY      broken bone surgery in his face    BRONCHOSCOPY THORACOTOMY Right 2024    Procedure: THORACOTOMY FOR LOWER LOBECTOMY AND MEDISTINAL LYMPH NODE DISSECTION RIGHT;  Surgeon: Joey Patel MD;  Location: [facility] OR;  Service: Cardiothoracic;  Laterality: Right;    BRONCHOSCOPY WITH ION ROBOTIC ASSIST N/A 09/15/2023    Procedure: BRONCHOSCOPY NAVIGATION WITH ENDOBRONCHIAL ULTRASOUND AND ION ROBOT;  Surgeon: Octaviano Sampson MD;  Location: [facility] ENDOSCOPY;  Service: Robotics -  Pulmonary;  Laterality: N/A;  ion #6 - 0032  - 0015  Cath guide 0061    EBUS balloon removed and intact    CARDIAC ELECTROPHYSIOLOGY PROCEDURE N/A 08/17/2021    Procedure: Pacemaker DC new;  Surgeon: Kayy Box MD;  Location: [facility] CATH INVASIVE LOCATION;  Service: Cardiology;  Laterality: N/A;    FACIAL FRACTURE SURGERY      PACEMAKER IMPLANTATION        General Information       Row Name 01/15/24 1221          Physical Therapy Time and Intention    Document Type therapy note (daily note)  -     Mode of Treatment physical therapy  -       Row Name 01/15/24 1221          General Information    Patient Profile Reviewed yes  -     Existing Precautions/Restrictions fall;oxygen therapy device and L/min  -     Barriers to Rehab medically complex  -BA       Row Name 01/15/24 1221          Cognition    Orientation Status (Cognition) oriented x 3  -BA       Row Name 01/15/24 1221          Safety Issues, Functional Mobility    Safety Issues Affecting Function (Mobility) awareness of need for assistance;insight into deficits/self-awareness;safety precaution awareness;safety precautions follow-through/compliance  -     Impairments Affecting Function (Mobility) balance;endurance/activity tolerance;postural/trunk control;shortness of breath;strength  -               User Key  (r) = Recorded By, (t) = Taken By, (c) = Cosigned By      Initials Name Provider Type     Denice Camacho, PT Physical Therapist                   Mobility       Row Name 01/15/24 1222          Bed Mobility    Comment, (Bed Mobility) Received [facility] upon arrival and returned to chair.  -       Row Name 01/15/24 1222          Sit-Stand Transfer    Sit-Stand [place] (Transfers) contact guard;standby assist;verbal cues  -     Assistive Device (Sit-Stand Transfers) walker, front-wheeled  -     Comment, (Sit-Stand Transfer) STS x 2 reps with CGA progressing to SBA.  VCs for sequencing and hand placement.  -       Row  Name 01/15/24 1222          Gait/Stairs (Locomotion)    [place] Level (Gait) contact guard;verbal cues  -BA     Assistive Device (Gait) walker, front-wheeled  -BA     Distance in Feet (Gait) 370  -BA     Deviations/Abnormal Patterns (Gait) bilateral deviations;melissa decreased;gait speed decreased;stride length decreased  -BA     Bilateral Gait Deviations forward flexed posture;heel strike decreased  -BA     Comment, (Gait/Stairs) Demo'd step through gait pattern with mild decreased melissa and step length.  Noted ROBERT/SOA as ambulation distance progressed.  O2 sats stable at 92% on RA immediately following ambulation.  VCs throughout for PLB, improved stride length, and improved heel strike upon IC.  Gait distance limited by fatigue.  -BA               User Key  (r) = Recorded By, (t) = Taken By, (c) = Cosigned By      Initials Name Provider Type    Denice Fleming, DENEEN Physical Therapist                   Obj/Interventions       Row Name 01/15/24 1238          Balance    Balance Assessment sitting static balance;sitting dynamic balance;sit to stand dynamic balance;standing static balance;standing dynamic balance  -BA     Static Sitting Balance independent  -BA     Dynamic Sitting Balance supervision  -BA     Position, Sitting Balance unsupported;sitting in chair  -BA     Sit to Stand Dynamic Balance contact guard;standby assist;verbal cues  -BA     Static Standing Balance standby assist  -BA     Dynamic Standing Balance contact guard;verbal cues  -BA     Position/Device Used, Standing Balance supported;walker, front-wheeled  -BA     Comment, Balance Intermittent mild instability with fairly steady gait overall with FWW; no LOB noted.  -BA               User Key  (r) = Recorded By, (t) = Taken By, (c) = Cosigned By      Initials Name Provider Type    Denice Fleming, DENEEN Physical Therapist                   Goals/Plan    No documentation.                  Clinical Impression       Row Name 01/15/24  1240          Pain    Pretreatment Pain Rating 1/10  -BA     Posttreatment Pain Rating 1/10  -BA     Pain Location - Side/Orientation Right  -     Pain Location incisional  -     Pain Location - flank  -BA     Pre/Posttreatment Pain Comment Reported some soreness around prior R chest tube insertion site.  Tolerated activity; RN aware and managing.  -     Pain Intervention(s) Ambulation/increased activity;Repositioned  -       Row Name 01/15/24 1240          Plan of Care Review    Plan of Care Reviewed With patient;spouse  -     Progress improving  -     Outcome Evaluation Pt showing improvements as he slightly increased ambulation distance to 370ft with CGA and FWW.  O2 sats stable b/w 92%-99% on RA throughout session.  Con to present below baseline with decreased functional endurance, ROBERT/SOA, mild gait instability, and weakness limiting indep with mobility.  Will con to benefit from skilled IP PT to improve deficits and promote return to PLOF.  Rec HWA upon d/c.  -       Row Name 01/15/24 1240          Vital Signs    Pre Systolic BP Rehab 117  -BA     Pre Treatment Diastolic BP 80  -BA     Post Systolic BP Rehab 120  -BA     Post Treatment Diastolic BP 76  -BA     Pretreatment Heart Rate (beats/min) 75  -BA     Posttreatment Heart Rate (beats/min) 70  -BA     Pre SpO2 (%) 98  -BA     O2 Delivery Pre Treatment nasal cannula  1L  -BA     Intra SpO2 (%) 92  -BA     O2 Delivery Intra Treatment room air  -BA     Post SpO2 (%) 97  -BA     O2 Delivery Post Treatment room air  RN gave clearance to remain on RA.  -BA     Pre Patient Position Sitting  -BA     Intra Patient Position Standing  -BA     Post Patient Position Sitting  -       Row Name 01/15/24 1240          Positioning and Restraints    Pre-Treatment Position sitting in chair/recliner  -BA     Post Treatment Position chair  -BA     In Chair notified nsg;reclined;sitting;call light within reach;encouraged to call for assist;exit alarm on;with  family/caregiver;waffle cushion;legs elevated  -BA               User Key  (r) = Recorded By, (t) = Taken By, (c) = Cosigned By      Initials Name Provider Type    Denice Fleming, DENEEN Physical Therapist                   Outcome Measures       Row Name 01/15/24 1319 01/15/24 0820       How much help from another person do you currently need...    Turning from your back to your side while in flat bed without using bedrails? 4  -BA 4  -CB    Moving from lying on back to sitting on the side of a flat bed without bedrails? 4  -BA 4  -CB    Moving to and from a bed to a chair (including a wheelchair)? 3  -BA 3  -CB    Standing up from a chair using your arms (e.g., wheelchair, bedside chair)? 3  -BA 3  -CB    Climbing 3-5 steps with a railing? 3  -BA 2  -CB    To walk in hospital room? 3  -BA 3  -CB    AM-PAC 6 Clicks Score (PT) 20  -BA 19  -CB    Highest Level of Mobility Goal 6 --> Walk 10 steps or more  -BA 6 --> Walk 10 steps or more  -CB      Row Name 01/15/24 1319          Functional Assessment    Outcome Measure Options AM-PAC 6 Clicks Basic Mobility (PT)  -               User Key  (r) = Recorded By, (t) = Taken By, (c) = Cosigned By      Initials Name Provider Type    Denice Fleming, PT Physical Therapist    Barrie Romero, RN Registered Nurse                                 Physical Therapy Education       Title: PT OT SLP Therapies (Done)       Topic: Physical Therapy (Done)       Point: Mobility training (Done)       Learning Progress Summary             Patient Acceptance, E, VU,NR by BA at 1/15/2024 1319    Eager, E, VU by SD at 1/12/2024 1639    Acceptance, E, NR by AS at 1/11/2024 0925    Acceptance, E, VU,NR by KG at 1/10/2024 1028   Significant Other Eager, E, VU by SD at 1/12/2024 1639                         Point: Home exercise program (Done)       Learning Progress Summary             Patient Eager, E, VU by SD at 1/12/2024 1639    Acceptance, E, NR by AS at 1/11/2024 0925   Significant  Other Eager, E, VU by SD at 1/12/2024 1639                         Point: Body mechanics (Done)       Learning Progress Summary             Patient Acceptance, E, VU,NR by BA at 1/15/2024 1319    Eager, E, VU by SD at 1/12/2024 1639    Acceptance, E, NR by AS at 1/11/2024 0925    Acceptance, E, VU,NR by KG at 1/10/2024 1028   Significant Other Eager, E, VU by SD at 1/12/2024 1639                         Point: Precautions (Done)       Learning Progress Summary             Patient Acceptance, E, VU,NR by BA at 1/15/2024 1319    Eager, E, VU by SD at 1/12/2024 1639    Acceptance, E, NR by AS at 1/11/2024 0925    Acceptance, E, VU,NR by KG at 1/10/2024 1028   Significant Other Eager, E, VU by SD at 1/12/2024 1639                                         User Key       Initials Effective Dates Name Provider Type Discipline    SD 03/13/23 -  Keira Smith, PT Physical Therapist PT    AS 04/28/23 -  Marie Coello, PTA Physical Therapist Assistant PT    KG 05/22/20 -  Maki Hayward PT Physical Therapist PT    BA 09/21/21 -  Denice Camacho, PT Physical Therapist PT                  PT Recommendation and Plan     Plan of Care Reviewed With: patient, spouse  Progress: improving  Outcome Evaluation: Pt showing improvements as he slightly increased ambulation distance to 370ft with CGA and FWW.  O2 sats stable b/w 92%-99% on RA throughout session.  Con to present below baseline with decreased functional endurance, ROBERT/SOA, mild gait instability, and weakness limiting indep with mobility.  Will con to benefit from skilled IP PT to improve deficits and promote return to PLOF.  Rec HWA upon d/c.     Time Calculation:         PT Charges       Row Name 01/15/24 1320             Time Calculation    Start Time 1128  -BA      PT Received On 01/15/24  -         Time Calculation- PT    Total Timed Code Minutes- PT 28 minute(s)  -         Timed Charges    06123 - Gait Training Minutes  16  -BA      29718 - PT  Therapeutic Activity Minutes 12  -BA         Total Minutes    Timed Charges Total Minutes 28  -BA       Total Minutes 28  -BA                User Key  (r) = Recorded By, (t) = Taken By, (c) = Cosigned By      Initials Name Provider Type    Denice Fleming, PT Physical Therapist                  Therapy Charges for Today       Code Description Service Date Service Provider Modifiers Qty    [id number]  GAIT TRAINING EA 15 MIN 1/15/2024 Denice Camacho, PT GP 1    [id number]  PT THERAPEUTIC ACT EA 15 MIN 1/15/2024 Denice Camacho, PT GP 1            PT G-Codes  Outcome Measure Options: AM-PAC 6 Clicks Basic Mobility (PT)  AM-PAC 6 Clicks Score (PT): 20  PT Discharge Summary  Anticipated Discharge Disposition (PT): home with assist    Denice Camacho, PT  1/15/2024

## 2024-01-15 NOTE — PLAN OF CARE
Goal Outcome Evaluation:  Plan of Care Reviewed With: patient, spouse        Progress: improving  Outcome Evaluation: Pt showing improvements as he slightly increased ambulation distance to 370ft with CGA and FWW.  O2 sats stable b/w 92%-99% on RA throughout session.  Con to present below baseline with decreased functional endurance, ROBERT/SOA, mild gait instability, and weakness limiting indep with mobility.  Will con to benefit from skilled IP PT to improve deficits and promote return to PLOF.  Rec HWA upon d/c.      Anticipated Discharge Disposition (PT): home with assist

## 2024-01-15 NOTE — CASE MANAGEMENT/SOCIAL WORK
Case Management Discharge Note      Final Note: Patient has orders for discharge. Spoke with patient and spouse at bedside regarding discharge plan who deny needs at this time. Patient reports he has been us and walking and feels back to baseline. Patient plan is to discharge home today via car with family to transport.         Selected Continued Care - Admitted Since 1/9/2024       Destination    No services have been selected for the patient.                Durable Medical Equipment    No services have been selected for the patient.                Dialysis/Infusion    No services have been selected for the patient.                Home Medical Care    No services have been selected for the patient.                Therapy    No services have been selected for the patient.                Community Resources    No services have been selected for the patient.                Community & DME    No services have been selected for the patient.                         Final Discharge Disposition Code: 01 - home or self-care

## 2024-01-15 NOTE — PROGRESS NOTES
[facility] Medicine Services  CLINICAL REVIEW NOTE    Patient Name: David Barfield  : 1950  MRN: [id number]    Date of Admission: 2024  Length of Stay: 6  Attending Service:  CTS    David Barfield is a 73 y.o. male w/ non-small cell lung cancer of RLL (stage IIIA) s/p neoadjuvant systemic therapy, s/p planned right lower lobectomy & mediastinal dissection. Transferred out of icu to telemetry on 24 and hospitalists asked to medically co-manage     Right Lung CA, s/p right lower lobectomy  --S/p right thoracotomy, right lower lobectomy, mediastinal lymph node dissection (lymph nodes level 4, 7 and 8) done on 2024 by Dr. Patel.   - Post-op care per CT surgery.   -pain control per ct surgery/ acute pain service, currently on a PCA  -home soon when ok w/ surgery  -Oncology following. lymph nodes were benign, margins were negative awaiting biopsy results for further adjuvant therapy planning.  Follow-up scheduled with on 2024.     Hyponatremia  - stable, not clinically significant     Postoperative Anemia, stable  - Hg was 11.2 on presentation, trended down following surgery     Pre-DM  -A1c 6.1  -diabetic diet  -not requiring medical treatment currently  -follow up w/ pcp     Constipation  -Bisacodyl, sennakot, miralax     COPD  -- Continue Spiriva and Symbicort inhalers; stable     GERD  --continue Prilosec     HTN, HLD  -continue lisinopril and pravastatin    Patient's labs, charting, and events reviewed.  No active medical management issues to address today, the Hospitalist team will continue to follow daily, be available for any needs, and see or bill for services as needed.

## 2024-01-15 NOTE — PLAN OF CARE
Goal Outcome Evaluation:              Outcome Evaluation: VSS; A/O x4; O2 > 90% on 1L NC; Pt resting in bed; family at bedside; call light within reach.

## 2024-01-16 ENCOUNTER — TRANSITIONAL CARE MANAGEMENT TELEPHONE ENCOUNTER (OUTPATIENT)
Dept: CALL CENTER | Facility: HOSPITAL | Age: 74
End: 2024-01-16
Payer: MEDICARE

## 2024-01-16 NOTE — OUTREACH NOTE
Call Center TCM Note      Flowsheet Row Responses   [facility] patient discharged from? [place]   Does the patient have one of the following disease processes/diagnoses(primary or secondary)? Cardiothoracic surgery   TCM attempt successful? Yes   Call start time 1008   Call end time 1012   Discharge diagnosis Bronchogenic cancer of right lung- THORACOTOMY FOR LOWER LOBECTOMY AND MEDISTINAL LYMPH NODE DISSECTION RIGHT   Meds reviewed with patient/caregiver? Yes   Is the patient having any side effects they believe may be caused by any medication additions or changes? No   Does the patient have all medications related to this admission filled (includes all antibiotics, pain medications, cardiac medications, etc.) Yes   Is the patient taking all medications as directed (includes completed medication regime)? Yes   Comments HOSP DC FU appt with PCP 1/18/24 1 pm.   Does the patient have an appointment with their PCP within 7-14 days of discharge? Yes   Has home health visited the patient within 72 hours of discharge? N/A   Psychosocial issues? No   Did the patient receive a copy of their discharge instructions? Yes   Nursing interventions Reviewed instructions with patient   What is the patient's perception of their health status since discharge? Improving   Nursing interventions Nurse provided patient education   Is the patient /caregiver able to teach back basic post-op care? No tub bath, swimming, or hot tub until instructed by MD, Shower daily, Lifting as instructed by MD in discharge instructions, Drive as instructed by MD in discharge instructions, Use a clean wash cloth and antibacterial bar or liquid soap to clean incisions   Is the patient/caregiver able to teach back signs and symptoms of incisional infection? Increased redness, swelling or pain at the incisonal site, Increased drainage or bleeding, Incisional warmth, Pus or odor from incision, Fever   Is the patient/caregiver able to teach back steps  to recovery at home? Set small, achievable goals for return to baseline health, Eat a well-balance diet   Is the patient/caregiver able to teach back the hierarchy of who to call/visit for symptoms/problems? PCP, Specialist, Home health nurse, Urgent Care, ED, 911 Yes   TCM call completed? Yes   Wrap up additional comments Pt reports he is doing ok at this time. No needs.   Call end time 1012            Olivia Ramirez RN    1/16/2024, 10:12 EST

## 2024-01-17 NOTE — DISCHARGE SUMMARY
[facility] Cardiothoracic Surgery  DISCHARGE SUMMARY    Patient Name: David Barfield  : 1950  MRN: [id number]    Date of Admission: 2024  8:44 AM  Date of Discharge:  1/15/2024  Primary Care Physician: No primary care provider on file.  Referred By: Joey Patel MD    IP Care Team:  Patient Care Team:  Maria L Hernandez RN as Nurse Navigator  Octaviano Sampson MD as Consulting Physician (Pulmonary Disease)  Neetu Ashley MD as Referring Physician (Hematology and Oncology)  Nimo Rodríguez MD as Consulting Physician (Radiation Oncology)    Consults       Date and Time Order Name Status Description    1/10/2024  7:06 AM Inpatient Hematology & Oncology Consult Completed             Hospital Course     Presenting Problem: Bronchogenic cancer of right lung    Active Hospital Problems    Diagnosis  POA    **Bronchogenic cancer of right lung [C34.91]  Yes    Primary hypertension [I10]  Clinically Undetermined    GERD without esophagitis [K21.9]  Yes    COPD (chronic obstructive pulmonary disease) [J44.9]  Yes    Mediastinal adenopathy [R59.0]  Yes    History of tobacco use, presenting hazards to health [Z87.891]  Not Applicable    Mixed hyperlipidemia [E78.2]  Yes      Resolved Hospital Problems    Diagnosis Date Resolved POA    Lung cancer [C34.90] 2024 Yes        Procedures Performed:  Procedure(s):  THORACOTOMY FOR LOWER LOBECTOMY AND MEDISTINAL LYMPH NODE DISSECTION RIGHT       Hospital Course:  David Barfield is a 73 y.o. male who was admitted to [facility] on 1/15/2024 for scheduled bronchoscopy, right thoracotomy with right lower lobectomy as well as need mediastinal lymph node dissection of lymph nodes level 4, 7, and 8 with Dr. Patel.  He was extubated without complications and was sent to ICU in stable condition.     NSCLC s/p right thoracotomy with RLL:  1/10: Transfer to telemetry orders were placed  : Transferred to telemetry floor  :  Chest tubes were placed to waterseal  1/15: Chest tubes were removed. Postpull CXR showed no acute findings. He was discharged home in stable condition.       Discharge Follow Up Recommendations for outpatient labs/diagnostics:  Follow-up with Dr. Neetu Ashley with hematology/oncology in 2 weeks  Follow-up with CT surgery in 2 to 4 weeks      DO NOT REMOVE SUTURES AND/OR STAPLES THIS WILL BE ADDRESSED AT CT SURGERY FOLLOW-UP  If you have any questions or concerns please reach out to our office at [phone]    Day of Discharge     HPI:   The patient is a 73-year-old male every day smoker who I had seen a number of months ago with a right sided lung malignancy.  He had arranged to undergo chemotherapy with consideration of surgical resection following that.  He has now undergone his chemotherapy. His PET scan demonstrates the nodule is smaller and has responded but is still hypermetabolic. There is also questionable hypermetabolic activity with some surrounding lymph nodes.  His echocardiogram demonstrates near normal ventricular function and pulmonary function tests are adequate for right lower lobe resection. The patient is here with family and they have multiple questions regarding potential upcoming surgery.     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   LYMPH NODE, RIGHT LEVEL 4, EXCISION:  1 benign lymph node  3.   LYMPH NODE, RIGHT LEVEL 7, EXCISION:  1 benign lymph node      Electronically signed by Kurt Sultana MD on 1/12/2024 at 1255   Comment    LUNG     SPECIMEN:   Procedure: Lobectomy  Specimen Laterality:  Right     TUMOR:   Tumor Focality: Single focus  Tumor Site: Lower lobe of lung  Tumor Size:   Total Tumor Size (size of entire tumor): 1.4 cm  Size of Invasive Component: 1.4 cm  Histologic Type:   - Adenosquamous carcinoma  Histologic Grade: G2, moderately differentiated  Spread Through Air Spaces (DAMIAN): Not identified  Visceral Pleura Invasion: Not identified  Direct Invasion of Adjacent Structures: Not applicable (no adjacent structures present)  Treatment Effect:   - Present    Percentage of Residual Viable Tumor: 60 %    Percentage of Necrosis: 0 %    Percentage of Stroma (includes fibrosis and inflammation): 20 %  Lymphovascular Invasion: Not identified     MARGINS:   Margin Status for Invasive Carcinoma:   - All margins negative for invasive carcinoma    Closest Margin(s) to Invasive Carcinoma: Bronchial    Distance from Invasive Carcinoma to Closest Margin: 3 cm  Margin Status for Non-Invasive Tumor: All margins negative for non-invasive tumor     REGIONAL LYMPH NODES:   Lymph Node(s) from Prior Procedures:   - Included    Prior Lymph Node Procedure(s) Included: ([id number])  3.  LYMPH NODE, STATION 11 L, TRANSBRONCHIAL NEEDLE ASPIRATION:  Portions of anthracotic lymph node with no tumor or granuloma identified.     4.  LYMPH NODE, STATION 4L, TRANSBRONCHIAL NEEDLE ASPIRATION:  Portions of anthracotic lymph node with no tumor or granuloma identified.     5.  LYMPH NODE, STATION 7, TRANSBRONCHIAL NEEDLE ASPIRATION:  Non-small cell carcinoma (see comment).     Regional Lymph Node Status:   All regional lymph nodes negative for tumor  Number of Lymph Nodes Examined:   - 7    Carloz Site(s) Examined: 4R: Lower paratracheal, 12R: Lobar, 7: Subcarinal     PATHOLOGIC STAGE CLASSIFICATION (pTNM, AJCC 8th Edition):   The suffix m (or a specific number) should only be used in the setting of multifocal ground-glass / lepidic nodules that histologically present as adenocarcinomas with prominent lepidic component or  multifocal tumors of same histologic type that are too numerous for individual separate synoptic report and that are not better classified as intrapulmonary metastases (e.g. numerous carcinoid tumors). Multiple primary lung cancers showing different histologic type or different morphology based on comprehensive histologic subtyping are better staged as independent tumors without m suffix.  TNM Descriptors: y (post-treatment)  pT Category: pT1b  pN Category: pN0     ADDITIONAL FINDINGS:   Additional Findings: None identified     Due to the unusual staining profile of the original biopsy, the specimen is restained with immunohistochemical stains with good controls.  The tumor shows definite coexpression of p40 and TTF-1, consistent with an adenosquamous carcinoma.           Lab 01/15/24  0432 01/14/24  0743 01/13/24  0810 01/12/24  0509 01/11/24  0721   WBC 6.85 7.15 7.51 9.65  --    HEMOGLOBIN 9.9* 9.6* 9.4* 10.1* 10.2*   HEMATOCRIT 30.1* 28.9* 28.7* 30.3* 31.1*   PLATELETS 291 280 250 246  --    MCV 98.7* 97.6* 99.0* 99.0*  --          Lab 01/15/24  0432 01/14/24  0743 01/13/24  0810 01/12/24  0509 01/11/24  0721   SODIUM 135* 133* 132* 133* 134*   POTASSIUM 4.3 4.9 4.5 5.0 4.7   CHLORIDE 97* 96* 94* 97* 97*   CO2 29.0 31.0* 30.0* 28.0 30.0*   ANION GAP 9.0 6.0 8.0 8.0 7.0   BUN 10 15 21 11 10   CREATININE 0.83 0.78 1.17 0.81 0.85   EGFR 91.8 93.6 65.4 92.5 91.2   GLUCOSE 110* 119* 114* 109* 110*   CALCIUM 8.8 8.8 9.0 9.2 8.8                         Brief Urine Lab Results       None          Microbiology Results (last 10 days)       ** No results found for the last 240 hours. **            XR Chest 1 View    Result Date: 1/15/2024  XR CHEST 1 VW Date of Exam: 1/15/2024 10:55 AM EST Indication: f/u pneumothorax Comparison: 1/15/2024 timed 0924 hours. Findings: There is no definite right pneumothorax. Heart and pulmonary vessels appear within normal limits. Right sided pacemaker/AICD again noted. Left subclavian deep  line is unchanged in position. There is mild atelectasis in the lung bases, greatest on the right, similar.     Impression: No definite pneumothorax identified after chest tube removal. Electronically Signed: Elsa Schmid MD  1/15/2024 11:24 AM EST  Workstation ID: [id number]    XR Chest 1 View    Result Date: 1/15/2024  XR CHEST 1 VW Date of Exam: 1/15/2024 9:26 AM EST Indication: post chest tube removal Comparison: 1/15/2024 Findings: Right thoracotomy tubes have been removed. There is a faint suggestion of a pleural line in the right lateral costophrenic angle that may represent a small pneumothorax. There is probably a 1-2 mm right apical pneumothorax. Mild bibasilar discoid atelectasis appears stable. No new pulmonary parenchymal disease is seen. Heart is upper normal size. Vasculature appears normal.     Impression: Small right apical and basilar pneumothorax following chest tube removal. Stable mild bibasilar discoid atelectasis. Electronically Signed: Baljeet Carlos MD  1/15/2024 9:38 AM EST  Workstation ID: [id number]    XR Chest 1 View    Result Date: 1/15/2024  XR CHEST 1 VW Date of Exam: 1/15/2024 3:43 AM EST Indication: postop right thoracotomy Comparison: 1/14/2024 Findings: Right-sided dual-lead pacemaker, right thoracotomy tubes, and left-sided Port-A-Cath are again noted. Mild hazy interstitial changes of the right lung and mild left basilar discoid atelectasis appear stable. No new pulmonary parenchymal disease, evidence  of edema or pneumothorax is seen. Trace subcutaneous emphysema on the right is stable.     Impression: No new chest disease. Electronically Signed: Baljeet Carlos MD  1/15/2024 7:30 AM EST  Workstation ID: [id number]    XR Chest 1 View    Result Date: 1/14/2024  XR CHEST 1 VW Date of Exam: 1/14/2024 5:24 AM EST Indication: postop right thoracotomy Comparison: 1/13/2024 Findings: Right-sided dual-lead pacemaker, left-sided Port-A-Cath, and right thoracotomy tubes are again noted. Minimal  apical and basilar pneumothorax is again seen and appears stable. There is minimal remaining bibasilar atelectasis. No new pulmonary parenchymal  disease is seen. Heart and vasculature appear normal in size.     Impression: 1. Trace remaining right pneumothorax. 2. Minimal remaining bibasilar atelectasis; no new chest disease is seen Electronically Signed: Baljeet Carlos MD  1/14/2024 8:54 AM EST  Workstation ID: [id number]    XR Chest 1 View    Result Date: 1/13/2024  XR CHEST 1 VW Date of Exam: 1/13/2024 1:47 AM EST Indication: postop right thoracotomy Comparison: 1/12/2024 at 0842 hours Findings: The right chest tubes are stable in position. Small residual pneumothorax is noted. There is atelectasis within the right lower lung. Atelectasis is also noted in the left lung base. There may be a small left pleural effusion. These findings are stable. The cardiac silhouette and mediastinum are stable. A left portacatheter is stable in position. A right cardiac pacemaker/AICD is noted with leads intact..     Impression: 1.Stable positioning of the right chest tubes. Small residual pneumothorax is seen on the right. 2.Residual atelectasis is noted in the right lower lung. There is also atelectasis within the left lung base. A small left pleural effusion is seen. These findings are stable. Electronically Signed: Gabino Ruiz MD  1/13/2024 8:28 AM EST  Workstation ID: [id number]    XR Chest 1 View    Result Date: 1/12/2024  XR CHEST 1 VW Date of Exam: 1/12/2024 7:38 AM CST Indication: dyspnea Comparison: 1/12/2024 at 3:18 a.m. Findings: Cardiomediastinal silhouette and support lines and tubes are unchanged. Stable right lung volume loss with basilar airspace disease and potential small subpulmonic pneumothorax. Left basilar airspace disease is unchanged. No acute osseous abnormality identified.     Impression: 1.Probable small right subpulmonic pneumothorax. No significant change otherwise.. Electronically Signed: Bertrand Gongora  MD  1/12/2024 8:01 AM CST  Workstation ID: [id number]    XR Chest 1 View    Result Date: 1/12/2024  XR CHEST 1 VW Date of Exam: 1/12/2024 3:03 AM EST Indication: postop right thoracotomy Comparison: 1/11/2024 Findings: 2 right-sided chest tubes are in stable position. No discrete pneumothorax. Volume loss with left right shift of the heart and mediastinum. Left-sided port catheter stable. AICD noted. Mild bibasilar atelectasis. No significant effusion.     Impression: 1. Stable right-sided chest tubes. No discrete pneumothorax. 2. Stable volume loss with left to right shift of the heart and mediastinum. 3. Mild bibasilar atelectasis. Electronically Signed: Aldo Handley MD  1/12/2024 7:40 AM EST  Workstation ID: [id number]    XR Chest 1 View    Result Date: 1/11/2024  XR CHEST 1 VW Date of Exam: 1/11/2024 8:12 AM EST Indication: Postop Comparison: 1/10/2024 Findings: Left approach central venous catheter with distal tip in unchanged position. Right chest wall cardiac device with distal lead tips unchanged. 2 right approach thoracostomy tubes with distal tips in stable position. Unchanged cardiomediastinal silhouette. Unchanged right basilar airspace opacities. No new focal airspace consolidation. No new pleural effusion. No visible pneumothorax. Osseous structures are unchanged.     Impression: No significant interval change. Electronically Signed: Raulito Light MD  1/11/2024 8:44 AM EST  Workstation ID: [id number]    XR Chest 1 View    Result Date: 1/10/2024  XR CHEST 1 VW Date of Exam: 1/10/2024 1:27 AM EST Indication: postop Comparison: 1/9/2024 Findings: Postsurgical changes of the right hemithorax with 2 right-sided chest tubes in place. No discrete pneumothorax. Minimal bibasilar atelectasis. Heart size normal. No significant effusion. AICD noted.     Impression: Postsurgical changes of the right hemithorax with 2 right-sided chest tubes in place. No pneumothorax. Electronically Signed: Aldo Handley MD  1/10/2024 7:54  AM EST  Workstation ID: [id number]    XR Chest 1 View    Result Date: 1/9/2024  XR CHEST 1 VW Date of Exam: 1/9/2024 12:58 PM EST Indication: postop Comparison: 12/29/2023. Findings: 2 right chest tubes are now present with their tips overlying the right upper lobe medially. There is no identifiable pneumothorax. Right transvenous pacemaker is unchanged in position. Left internal jugular port with tip in the mid SVC again noted. There is some mild linear atelectasis in the right lung base. The left lung is clear.    Impression: Interval placement of 2 right chest tubes. No pneumothorax. Minimal atelectasis noted right lung base. Electronically Signed: Elsa Schmid MD  1/9/2024 1:22 PM EST  Workstation ID: [id number]             Results for orders placed during the hospital encounter of 07/09/21    Adult Transthoracic Echo Complete W/ Cont if Necessary Per Protocol    Interpretation Summary  · Estimated left ventricular EF = 55% Left ventricular systolic function is normal.  · Left ventricular diastolic function was normal.  · Left ventricular wall thickness is consistent with mild concentric hypertrophy.  · Trace mitral and tricuspid regurgitation.          Discharge Details        Discharge Medications        New Medications        Instructions Start Date   HYDROcodone-acetaminophen 7.5-325 MG per tablet  Commonly known as: Norco   1 tablet, Oral, Every 6 Hours PRN      tamsulosin 0.4 MG capsule 24 hr capsule  Commonly known as: FLOMAX   0.4 mg, Oral, Daily             Continue These Medications        Instructions Start Date   albuterol sulfate  (90 Base) MCG/ACT inhaler  Commonly known as: PROVENTIL HFA;VENTOLIN HFA;PROAIR HFA   2 puffs, Inhalation, Every 4 Hours PRN      fluticasone 27.5 MCG/SPRAY nasal spray  Commonly known as: VERAMYST   2 sprays, Nasal, Once As Needed      lidocaine-prilocaine 2.5-2.5 % cream  Commonly known as: EMLA   1 application , Topical, As Needed      lisinopril 2.5 MG  tablet  Commonly known as: PRINIVIL,ZESTRIL   2.5 mg, Oral, As Needed, Take 1/2 tablet po prn      omeprazole 40 MG capsule  Commonly known as: priLOSEC   40 mg, Oral, Daily      ondansetron 8 MG tablet  Commonly known as: ZOFRAN   8 mg, Oral, 3 Times Daily PRN      pravastatin 80 MG tablet  Commonly known as: PRAVACHOL   80 mg, Oral, Nightly      sildenafil 100 MG tablet  Commonly known as: VIAGRA   50 mg, Oral, Daily PRN      Trelegy Ellipta 100-62.5-25 MCG/ACT inhaler  Generic drug: Fluticasone-Umeclidin-Vilant   1 puff, Inhalation, Daily - RT               Allergies   Allergen Reactions    Latex Other (See Comments)     Latex allergy     Tape Rash         Discharge Disposition:  Home or Self Care    Diet:  Hospital:No active diet order      Activity:  Activity Instructions       Activity as Tolerated      Bathing Restrictions      You may shower    Type of Restriction: Bathing    Bathing Restrictions: No Tub Bath    Driving Restrictions      Type of Restriction: Driving    Driving Restrictions: No Driving While Taking Narcotics    Lifting Restrictions      Type of Restriction: Lifting    Lifting Restrictions: Lifting Restriction (Indicate Limit)    Weight Limit (Pounds): 10    Length of Lifting Restriction: until seen at next follow-up appointment                 CODE STATUS:    Code Status and Medical Interventions:   Ordered at: 01/09/24 1254     Code Status (Patient has no pulse and is not breathing):    CPR (Attempt to Resuscitate)     Medical Interventions (Patient has pulse or is breathing):    Full Support       Future Appointments   Date Time Provider Department Center   1/18/2024  1:00 PM Shahid Drake MD [facility]   1/23/2024  9:45 AM Neetu Ashley MD [facility]   2/2/2024 10:00 AM Emely Martinez APRN [facility]   2/8/2024  1:45 PM Octaviano Sampson MD [facility]   7/10/2024 11:00 AM Kayy Box MD [facility]       Additional Instructions for the  Follow-ups that You Need to Schedule       Call MD With Problems / Concerns   As directed      Instructions:   Please call the CT Surgery office with any questions or concerns you may have [phone]    Order Comments: Instructions: Please call the CT Surgery office with any questions or concerns you may have [phone]         Discharge Follow-up with PCP   As directed       Currently Documented PCP:    No primary care provider on file.    PCP Phone Number:    None     Follow Up Details: Follow-up with your PCP within 1-2 weeks        Discharge Follow-up with Specialty: Cardiothoracic Surgery   As directed      Specialty: Cardiothoracic Surgery   Follow Up Details: Follow-up within 2-4 weeks                Discharge Education:  Tobacco Cessation:  I advised patient to quit, and offered support.  Post-Op Education:  Patient was advised on responsible use of opioids in the post-surgical setting.  Patient was advised these medications are highly addictive.  Patient advised on proper disposal of unused opioid medication and was urged to discard any unused opioid medication.  Wound Care:  Patient educated regarding care of post-operative wounds.  Patient is to wash with plain soap and water and pat dry.  Patient is not to use salves on the incision sites.  Patient instructed regarding the signs and symptoms of infection and when to call the office with any concerns.    When to Call the Cardiac Surgeon:  -Increased swelling, redness, tenderness, and drainage  -Increased warmth in the skin around an incision  -Large amount of clear or pinkish drainage  -Sudden increased amount of drainage  -White, yellow, or greenish drainage  -Odor, which may be foul or sweet, coming from an incision  -An opening in your incisions  -Temperature higher than 101 degrees Fahrenheit   -Temperature higher than 100 degrees Fahrenheit two times within 24 hours  -Do not hesitate to call the cardiac surgery nurse navigator or cardiac surgeon  if you have concerns or questions.       Ruma Orozco, APRN  01/17/24  10:34 EST    Time: I spent 25 minutes on this discharge activity which included: face-to-face encounter with the patient, reviewing the data in the system, coordination of the care with the nursing staff as well as consultants, documentation, and entering orders.

## 2024-01-18 ENCOUNTER — LAB (OUTPATIENT)
Dept: INTERNAL MEDICINE | Facility: CLINIC | Age: 74
End: 2024-01-18
Payer: MEDICARE

## 2024-01-18 ENCOUNTER — OFFICE VISIT (OUTPATIENT)
Dept: INTERNAL MEDICINE | Facility: CLINIC | Age: 74
End: 2024-01-18
Payer: MEDICARE

## 2024-01-18 ENCOUNTER — HOSPITAL ENCOUNTER (OUTPATIENT)
Dept: GENERAL RADIOLOGY | Facility: HOSPITAL | Age: 74
Discharge: HOME | End: 2024-01-18
Admitting: STUDENT IN AN ORGANIZED HEALTH CARE EDUCATION/TRAINING PROGRAM
Payer: MEDICARE

## 2024-01-18 VITALS
TEMPERATURE: 97.3 F | WEIGHT: 196.2 LBS | SYSTOLIC BLOOD PRESSURE: 130 MMHG | DIASTOLIC BLOOD PRESSURE: 60 MMHG | OXYGEN SATURATION: 98 % | HEART RATE: 72 BPM | HEIGHT: 72 IN | BODY MASS INDEX: 26.57 KG/M2

## 2024-01-18 DIAGNOSIS — C34.91 BRONCHOGENIC CANCER OF RIGHT LUNG: ICD-10-CM

## 2024-01-18 DIAGNOSIS — R91.1 LUNG NODULE: ICD-10-CM

## 2024-01-18 DIAGNOSIS — I10 PRIMARY HYPERTENSION: ICD-10-CM

## 2024-01-18 DIAGNOSIS — J44.9 CHRONIC OBSTRUCTIVE PULMONARY DISEASE, UNSPECIFIED COPD TYPE: ICD-10-CM

## 2024-01-18 DIAGNOSIS — Z09 HOSPITAL DISCHARGE FOLLOW-UP: Primary | ICD-10-CM

## 2024-01-18 DIAGNOSIS — F17.209 NICOTINE DEPENDENCE WITH NICOTINE-INDUCED DISORDER, UNSPECIFIED NICOTINE PRODUCT TYPE: ICD-10-CM

## 2024-01-18 PROCEDURE — 85027 COMPLETE CBC AUTOMATED: CPT | Performed by: STUDENT IN AN ORGANIZED HEALTH CARE EDUCATION/TRAINING PROGRAM

## 2024-01-18 PROCEDURE — 36415 COLL VENOUS BLD VENIPUNCTURE: CPT | Performed by: STUDENT IN AN ORGANIZED HEALTH CARE EDUCATION/TRAINING PROGRAM

## 2024-01-18 PROCEDURE — 80048 BASIC METABOLIC PNL TOTAL CA: CPT | Performed by: STUDENT IN AN ORGANIZED HEALTH CARE EDUCATION/TRAINING PROGRAM

## 2024-01-18 PROCEDURE — 71046 X-RAY EXAM CHEST 2 VIEWS: CPT

## 2024-01-18 NOTE — PATIENT INSTRUCTIONS
1/23/2024 9:45 AM Neetu Ashley MD [facility] [place] [id number] [facility]     Encounter Information   Provider Department Encounter # Center   2/2/2024 10:00 AM Emely Martinez, APRN        2/8/2024 1:45 PM Octaviano Sampson MD [facility] [id number] [facility]

## 2024-01-18 NOTE — PROGRESS NOTES
Transitional Care Follow Up Visit  Subjective     David Barfield is a 73 y.o. male who presents for a transitional care management visit.    Within 48 business hours after discharge our office contacted him via telephone to coordinate his care and needs.      I reviewed and discussed the details of that call along with the discharge summary, hospital problems, inpatient lab results, inpatient diagnostic studies, and consultation reports with David.     Current outpatient and discharge medications have been reconciled for the patient.  Reviewed by: Shahid Drake MD          1/15/2024     2:58 PM   Date of TCM Phone Call   [facility]   Date of Admission 1/9/2024   Date of Discharge 1/15/2024   Discharge Disposition Home or Self Care     Risk for Readmission (LACE) Score: 11 (1/15/2024  6:00 AM)      History of Present Illness     73-year-old male who presents today for follow-up for hospital stay after scheduled brusque ostomy and right thoracotomy and right lower lobectomy due to bronchogenic cancer of the right lung.  He sees Dr. Patel for CT surgery as an outpatient.  Prior to hospitalization the patient had undergone chemotherapy, with subsequent PET scan still showing hypermetabolic activity in and some activity in the surrounding lymph nodes.  Thus his reason for admission for further lung resection.  Had lymph nodes biopsy.  Prior to discharge in the hospital patient had his chest tubes removed he was being monitored for right pneumothorax with his prior chest x-ray showing trace remaining right pneumo with bilateral atelectasis.  Discharge patient has been recovering well.  His wife here with him today does have questions about the incisional size and wants to make sure there is no signs of any infection patient denies any fever no worsening shortness of breath no weakness no nausea no vomiting there is no abnormal discharge he has bandages across his incisional sites.ll.         The following portions of the patient's history were reviewed and updated as appropriate: allergies, current medications, past family history, past medical history, past social history, past surgical history, and problem list.    Review of Systems   All other systems reviewed and are negative.      Objective   Physical Exam  Constitutional:       Appearance: Normal appearance.   HENT:      Head: Normocephalic and atraumatic.      Right Ear: Tympanic membrane normal.      Left Ear: Tympanic membrane normal.   Cardiovascular:      Rate and Rhythm: Normal rate and regular rhythm.      Pulses: Normal pulses.      Heart sounds: Normal heart sounds.   Pulmonary:      Effort: Pulmonary effort is normal.      Breath sounds: Normal breath sounds.   Abdominal:      General: Abdomen is flat.      Palpations: Abdomen is soft.   Skin:     Comments: Well-healing incisional sites on the posterolateral chest wall.  Sutures are in place  No signs of erythema no warmth no abnormal discharge.   Neurological:      Mental Status: He is alert.          :       Data reviewed : Recent hospitalization notes I reviewed his hospitalization notes from January 9 to January 15, 2024    All imaging and labs reviewed      Assessment & Plan   Diagnoses and all orders for this visit:    1. Hospital discharge follow-up (Primary)    2. Bronchogenic cancer of right lung    3. Primary hypertension  -     Basic metabolic panel; Future  -     CBC No Differential; Future    4. Nicotine dependence with nicotine-induced disorder, unspecified nicotine product type    5. Chronic obstructive pulmonary disease, unspecified COPD type    Other orders  -     Cancel: XR Chest 1 View; Future                 MD ESTEBAN Remy [facility] [address]  [facility]  [address]  [address] [id number] [phone]

## 2024-01-19 LAB
ANION GAP SERPL CALCULATED.3IONS-SCNC: 11.1 MMOL/L (ref 5–15)
BUN SERPL-MCNC: 10 MG/DL (ref 8–23)
BUN/CREAT SERPL: 10.3 (ref 7–25)
CALCIUM SPEC-SCNC: 9.4 MG/DL (ref 8.6–10.5)
CHLORIDE SERPL-SCNC: 101 MMOL/L (ref 98–107)
CO2 SERPL-SCNC: 24.9 MMOL/L (ref 22–29)
CREAT SERPL-MCNC: 0.97 MG/DL (ref 0.76–1.27)
DEPRECATED RDW RBC AUTO: 53.9 FL (ref 37–54)
EGFRCR SERPLBLD CKD-EPI 2021: 81.9 ML/MIN/1.73
ERYTHROCYTE [DISTWIDTH] IN BLOOD BY AUTOMATED COUNT: 15.5 % (ref 12.3–15.4)
GLUCOSE SERPL-MCNC: 104 MG/DL (ref 65–99)
HCT VFR BLD AUTO: 30.7 % (ref 37.5–51)
HGB BLD-MCNC: 10.5 G/DL (ref 13–17.7)
MCH RBC QN AUTO: 32.8 PG (ref 26.6–33)
MCHC RBC AUTO-ENTMCNC: 34.2 G/DL (ref 31.5–35.7)
MCV RBC AUTO: 95.9 FL (ref 79–97)
PLATELET # BLD AUTO: 368 10*3/MM3 (ref 140–450)
PMV BLD AUTO: 8.6 FL (ref 6–12)
POTASSIUM SERPL-SCNC: 4.3 MMOL/L (ref 3.5–5.2)
RBC # BLD AUTO: 3.2 10*6/MM3 (ref 4.14–5.8)
SODIUM SERPL-SCNC: 137 MMOL/L (ref 136–145)
WBC NRBC COR # BLD AUTO: 11.75 10*3/MM3 (ref 3.4–10.8)

## 2024-01-22 ENCOUNTER — OFFICE VISIT (OUTPATIENT)
Dept: CARDIAC SURGERY | Facility: CLINIC | Age: 74
End: 2024-01-22
Payer: MEDICARE

## 2024-01-22 ENCOUNTER — TELEPHONE (OUTPATIENT)
Dept: CARDIAC SURGERY | Facility: CLINIC | Age: 74
End: 2024-01-22

## 2024-01-22 VITALS
TEMPERATURE: 98.6 F | BODY MASS INDEX: 26.71 KG/M2 | OXYGEN SATURATION: 94 % | HEART RATE: 85 BPM | SYSTOLIC BLOOD PRESSURE: 138 MMHG | WEIGHT: 197.2 LBS | DIASTOLIC BLOOD PRESSURE: 82 MMHG | HEIGHT: 72 IN

## 2024-01-22 DIAGNOSIS — Z98.890 S/P THORACOTOMY: Primary | ICD-10-CM

## 2024-01-22 PROCEDURE — 99024 POSTOP FOLLOW-UP VISIT: CPT | Performed by: NURSE PRACTITIONER

## 2024-01-22 PROCEDURE — 3075F SYST BP GE 130 - 139MM HG: CPT | Performed by: NURSE PRACTITIONER

## 2024-01-22 PROCEDURE — 3079F DIAST BP 80-89 MM HG: CPT | Performed by: NURSE PRACTITIONER

## 2024-01-22 NOTE — PROGRESS NOTES
"     [facility] Cardiothoracic Surgery Office Follow Up Note     Date of Encounter: 2024     Name: David Barfield  : 1950     Referred By: No ref. provider found  PCP: Shahid Drake MD    Chief Complaint:    Chief Complaint   Patient presents with    Follow-up     Pt had Thor lower lobectomy, and mediastinal node dissection for lung cancer.- ROM - 24. Pt states that he is having pain in the incision and when he moves around then the pain increases, causing him to get SOB and break out into a sweat.       Subjective      History of Present Illness:    David Barfield is a 73 y.o. male current smoker with history of COPD, HTN, HLD on statin therapy, AV block s/p PPM , and enlarging hypermetabolic 2.9cm RLL mass with EBUS positive level 7 lymph node (N2 disease) representing clinical stage IIIA cancer who underwent neoadjuvant chemotherapy with Dr Ashley. He completed 3 cycles  2023 with post-treatment imaging revealing appropriate response to primary tumor with mild residual uptake in the mediastinum.   He underwent bronc, right thoracotomy for right lower lobectomy and mediastinal lymph node dissection on 2024 by Dr. Patel.  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  Patient had uneventful postoperative course with chest tubes removed on 1/15/2024.  He had stable post pull CXR and met criteria for discharge on POD #6 (1/15/2024).  He contacted our office earlier today for poorly control chest wall pain and presents today for same day eval. Pt states he was having zero pain so he stopped taking his pain medication. Apparently he woke up from a nap and his right side was \"killing him.\" He was instructed to take his pain pill by our office but significant other requested same day appointment. " "Since taking his medicine he is now having no uncontrolled pain. He is walking up to 3 laps around his \"gigantic 6 story house\" with minimal reset breaks.   He has follow-up with oncology tomorrow.     Review of Systems:  Review of Systems   Constitutional: Positive for malaise/fatigue. Negative for chills, decreased appetite, diaphoresis, fever, night sweats, weight gain and weight loss.   HENT:  Positive for congestion. Negative for hoarse voice.    Eyes:  Negative for blurred vision, double vision and visual disturbance.   Cardiovascular:  Positive for dyspnea on exertion (pt states that he does get SOB w/ exertion). Negative for chest pain, claudication, irregular heartbeat, leg swelling, near-syncope, orthopnea, palpitations, paroxysmal nocturnal dyspnea and syncope.   Respiratory:  Positive for cough and sputum production. Negative for hemoptysis, shortness of breath and wheezing.    Hematologic/Lymphatic: Positive for bleeding problem. Negative for adenopathy. Bruises/bleeds easily.   Skin:  Negative for color change, nail changes, poor wound healing and rash.   Musculoskeletal:  Negative for back pain, falls and muscle cramps.   Gastrointestinal:  Negative for abdominal pain, dysphagia and heartburn.   Genitourinary:  Negative for flank pain.   Neurological:  Positive for loss of balance (ritgh hand is almost always numb). Negative for brief paralysis, disturbances in coordination, dizziness, focal weakness, headaches, light-headedness, numbness, paresthesias, sensory change, vertigo and weakness.   Psychiatric/Behavioral:  Negative for depression and suicidal ideas. The patient is nervous/anxious.    Allergic/Immunologic: Positive for environmental allergies. Negative for persistent infections.       I have reviewed the following portions of the patient's history: problem list, current medications, allergies, past surgical history, past medical history, past social history, past family history, and ROS and " "confirm it's accurate.    Allergies:  Allergies   Allergen Reactions    Gabapentin Mental Status Change     Pt states that this medication \"makes him feel foolish in his head\".     Toradol [Ketorolac Tromethamine] GI Intolerance     Projectile vomiting     Latex Other (See Comments)     Latex allergy     Tape Rash       Medications:      Current Outpatient Medications:     albuterol sulfate  (90 Base) MCG/ACT inhaler, Inhale 2 puffs Every 4 (Four) Hours As Needed for Wheezing., Disp: 18 g, Rfl: 11    fluticasone (VERAMYST) 27.5 MCG/SPRAY nasal spray, 2 sprays into the nostril(s) as directed by provider 1 (One) Time As Needed for Rhinitis or Allergies., Disp: 6 mL, Rfl: 2    Fluticasone-Umeclidin-Vilant (Trelegy Ellipta) 100-62.5-25 MCG/ACT inhaler, Inhale 1 puff Daily., Disp: 3 each, Rfl: 3    HYDROcodone-acetaminophen (Norco) 7.5-325 MG per tablet, Take 1 tablet by mouth Every 6 (Six) Hours As Needed for Moderate Pain., Disp: 25 tablet, Rfl: 0    lidocaine-prilocaine (EMLA) 2.5-2.5 % cream, Apply 1 application  topically to the appropriate area as directed As Needed (45-60 minutes prior to port access.  Cover with saran/plastic wrap.)., Disp: 30 g, Rfl: 3    lisinopril (PRINIVIL,ZESTRIL) 2.5 MG tablet, Take 1 tablet by mouth As Needed (elevated blood pressure). Take 1/2 tablet po prn (Patient taking differently: Take 1 tablet by mouth As Needed (elevated blood pressure systolic over 140). Take 1/2 tablet po prn), Disp: 30 tablet, Rfl: 1    omeprazole (priLOSEC) 40 MG capsule, Take 1 capsule by mouth Daily., Disp: 30 capsule, Rfl: 5    ondansetron (ZOFRAN) 8 MG tablet, Take 1 tablet by mouth 3 (Three) Times a Day As Needed for Nausea or Vomiting., Disp: 30 tablet, Rfl: 2    pravastatin (PRAVACHOL) 80 MG tablet, Take 1 tablet by mouth Every Night., Disp: 30 tablet, Rfl: 11    sildenafil (VIAGRA) 100 MG tablet, Take 0.5 tablets by mouth Daily As Needed for Erectile Dysfunction., Disp: , Rfl:     tamsulosin " (FLOMAX) 0.4 MG capsule 24 hr capsule, Take 1 capsule by mouth Daily., Disp: 30 capsule, Rfl: 1    History:   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    Diabetes mellitus Borderline    Emphysema/COPD     Erectile disorder     GERD (gastroesophageal reflux disease)     History of chemotherapy     Hyperlipidemia     Hypertension 2019    Lung nodule     Mumps     Mumps     Pruritus     after bath    Slow to wake up after anesthesia     Wears dentures     upper only    Wears hearing aid in both ears     usually only wears right       Past Surgical History:   Procedure Laterality Date    BONE BIOPSY      broken bone surgery in his face    BRONCHOSCOPY THORACOTOMY Right 1/9/2024    Procedure: THORACOTOMY FOR LOWER LOBECTOMY AND MEDISTINAL LYMPH NODE DISSECTION RIGHT;  Surgeon: Joey Patel MD;  Location: [facility] OR;  Service: Cardiothoracic;  Laterality: Right;    BRONCHOSCOPY WITH ION ROBOTIC ASSIST N/A 09/15/2023    Procedure: BRONCHOSCOPY NAVIGATION WITH ENDOBRONCHIAL ULTRASOUND AND ION ROBOT;  Surgeon: Octaviano Sampson MD;  Location:  [facility];  Service: Robotics - Pulmonary;  Laterality: N/A;  ion #6 - 0032  - 0015  Cath guide 0061    EBUS balloon removed and intact    CARDIAC ELECTROPHYSIOLOGY PROCEDURE N/A 08/17/2021    Procedure: Pacemaker DC new;  Surgeon: Kayy Box MD;  Location:  [facility];  Service: Cardiology;  Laterality: N/A;    FACIAL FRACTURE SURGERY      PACEMAKER IMPLANTATION         Social History     Socioeconomic History    Marital status: Significant Other    Number of children: 3   Tobacco Use    Smoking status: Former     Packs/day: 1.00     Years: 53.00     Additional pack years: 0.00     Total pack years: 53.00     Types: Cigarettes     Start date: 1/1/1968    Smokeless tobacco: Never    Tobacco comments:     Pt states that he quit  "smoking on 1/8/24. The day before his sx.    Vaping Use    Vaping Use: Never used   Substance and Sexual Activity    Alcohol use: Never    Drug use: Never    Sexual activity: Defer     Partners: Female     Birth control/protection: None        Family History   Problem Relation Age of Onset    Aneurysm Mother         brain    Dementia Father     Leukemia Sister     Hypertension Paternal Grandfather     Heart disease Paternal Grandmother        Objective     Physical Exam:  Vitals:    01/22/24 1408   BP: 138/82   Pulse: 85   Temp: 98.6 °F (37 °C)   SpO2: 94%   Weight: 89.4 kg (197 lb 3.2 oz)   Height: 182.9 cm (72\")  Comment: per pt      Body mass index is 26.75 kg/m².    Physical Exam  Vitals and nursing note reviewed.   Constitutional:       General: He is awake.      Appearance: Normal appearance.   Cardiovascular:      Rate and Rhythm: Normal rate and regular rhythm.      Pulses: Normal pulses.      Heart sounds: Normal heart sounds, S1 normal and S2 normal.   Pulmonary:      Effort: Pulmonary effort is normal. Prolonged expiration present. No tachypnea, bradypnea, accessory muscle usage, respiratory distress or retractions.      Breath sounds: Normal breath sounds and air entry. No decreased air movement. No wheezing, rhonchi or rales.   Abdominal:      Palpations: Abdomen is soft.   Musculoskeletal:         General: Normal range of motion.      Right lower leg: No edema.      Left lower leg: No edema.   Lymphadenopathy:      Cervical: No cervical adenopathy.      Right cervical: No superficial, deep or posterior cervical adenopathy.     Left cervical: No superficial, deep or posterior cervical adenopathy.      Upper Body:      Right upper body: No supraclavicular or axillary adenopathy.      Left upper body: No supraclavicular or axillary adenopathy.   Skin:     General: Skin is warm and dry.      Capillary Refill: Capillary refill takes less than 2 seconds.      Findings: No lesion.      Nails: There is no " clubbing.      Comments: Thoracotomy incision: well healing with wound edges approximated, no surrounding erythema, edema, tenderness or induration on palpation  Chest tube site: closed with no surround erythema, warmth, or tenderness    Neurological:      General: No focal deficit present.      Mental Status: He is alert and oriented to person, place, and time. Mental status is at baseline.      GCS: GCS eye subscore is 4. GCS verbal subscore is 5. GCS motor subscore is 6.      Sensory: Sensation is intact.      Motor: Motor function is intact.      Coordination: Coordination is intact.      Gait: Gait is intact.   Psychiatric:         Mood and Affect: Mood and affect normal. Mood is not depressed.         Speech: Speech normal.         Behavior: Behavior normal. Behavior is cooperative.         Thought Content: Thought content normal.         Judgment: Judgment normal.         Imaging/Labs:  XR Chest 2 View (01/22/2024 14:31)     XR Chest 2 View (01/18/2024 14:55)   1.The right chest tube has been removed. A small residual hydropneumothorax is seen on the right. The component of pneumothorax at the apex measures approximately 5 mm. Recommend follow-up to ensure resolution.  2.Residual atelectasis is noted in the right mid to lower lung and left lung base.   Electronically Signed: Gabino Ruiz MD     XR Chest 1 View (01/15/2024 11:19)   No definite pneumothorax identified after chest tube removal.   Electronically Signed: Elsa Schmid MD     NM PET/CT Skull Base to Mid Thigh (12/27/2023 10:08)   Decreased size and decreased hypermetabolism of the known primary malignancy in the right lower lobe compatible with treatment response. Similar appearance of a few shotty right paratracheal and right supraclavicular lymph nodes with low-level FDG   avidity, somewhat nonspecific. No evidence of new or distant metastatic disease.   Electronically Signed: Godfrey Real MD     MRI Brain With & Without Contrast  (09/28/2023 09:13)   No evidence of intracranial metastatic disease.   Mild to moderate typical age-related changes are present as above. There is otherwise no evidence of acute ischemia, hemorrhage, mass or abnormal enhancement.   Electronically Signed: Alexandre Matos MD     CT Chest Without Contrast Diagnostic (09/08/2023 09:01)   1. There is a 2.9 x 1.7 cm spiculated mass in the posterior medial aspect of the superior segment of the right lower lobe with some partial central cavitation. This is highly suspicious for primary lung cancer. There are enlarged lymph nodes in the   subcarinal region and right paratracheal region which may be metastatic or reactive. PET scan may be of value to determine malignant potential and staging.  2. Additional subcentimeter pulmonary nodules measuring up to 5 mm. Attention on follow-up recommended.  3. Additional findings as given above.   Electronically Signed: Michael Whipple MD     Assessment / Plan      Assessment / Plan:  Diagnoses and all orders for this visit:    1. S/P thoracotomy (Primary)         enlarging hypermetabolic 2.9cm RLL mass with EBUS positive level 7 lymph node (N2 disease) representing clinical stage IIIA cancer   s/p neoadjuvant chemotherapy with Dr Ashley. Completed 3 cycles 11/2023   post-treatment imaging revealing appropriate response to primary tumor with mild residual uptake in the mediastinum.   s/p bronc, right thoracotomy for right lower lobectomy and mediastinal lymph node dissection on 1/9/2024 by Dr. Patel.    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  uneventful postoperative course with chest tubes removed on 1/15/2024. stable post pull CXR and met criteria for discharge on POD #6 (1/15/2024).  Early post-op & same day eval for typical chest wall pain  consistent with post-thoracotomy pain  Majority of encounter spent providing education on expected post-operative course and pain management tips. Pt instructed to begin OTC anti-inflammatory TID with meals and use narcotic for break-through pain. Discussed taking pain medication when his pain begins climbing above a 5 and avoiding waiting for it to reach higher than 7-8 to maintain control. He can also add topicals such as heat, avoiding direct skin contact  Continue wound care with plain antibacterial soap. No overt s/s or complication today   No uncontrolled respiratory symptoms. Pt had CXR with PCP concerning for scant 5mm right apical PTX as well as hydropneumothorax   Repeat CXR today stable if not improved lung markings per Dr Ha review.   Keep traditional post-op appointment   Follow-up with oncology as previously scheduled     Follow Up: 2/2/2024  No follow-ups on file.   Or sooner for any further concerns or worsening sign and symptoms. If unable to reach us in the office please dial 911 or go to the nearest emergency department.      ANAMIKA Mirza  [facility] Cardiothoracic Surgery            Anxious

## 2024-01-22 NOTE — TELEPHONE ENCOUNTER
Pts wife called asking for the pt to be seen for an eval for pain  in the incision site. States that he is not SOB but that the pain is so intense that it causes him to lose his breath. I called the pt back

## 2024-01-23 ENCOUNTER — OFFICE VISIT (OUTPATIENT)
Dept: ONCOLOGY | Facility: CLINIC | Age: 74
End: 2024-01-23
Payer: MEDICARE

## 2024-01-23 VITALS
HEART RATE: 88 BPM | SYSTOLIC BLOOD PRESSURE: 159 MMHG | BODY MASS INDEX: 26.28 KG/M2 | RESPIRATION RATE: 20 BRPM | TEMPERATURE: 97.3 F | WEIGHT: 194 LBS | OXYGEN SATURATION: 99 % | DIASTOLIC BLOOD PRESSURE: 98 MMHG | HEIGHT: 72 IN

## 2024-01-23 DIAGNOSIS — C34.31 MALIGNANT NEOPLASM OF LOWER LOBE OF RIGHT LUNG: Primary | ICD-10-CM

## 2024-01-23 DIAGNOSIS — Z09 CHEMOTHERAPY FOLLOW-UP EXAMINATION: ICD-10-CM

## 2024-01-23 PROCEDURE — 3080F DIAST BP >= 90 MM HG: CPT | Performed by: INTERNAL MEDICINE

## 2024-01-23 PROCEDURE — 1125F AMNT PAIN NOTED PAIN PRSNT: CPT | Performed by: INTERNAL MEDICINE

## 2024-01-23 PROCEDURE — 99214 OFFICE O/P EST MOD 30 MIN: CPT | Performed by: INTERNAL MEDICINE

## 2024-01-23 PROCEDURE — 3077F SYST BP >= 140 MM HG: CPT | Performed by: INTERNAL MEDICINE

## 2024-01-23 RX ORDER — DULOXETIN HYDROCHLORIDE 30 MG/1
30 CAPSULE, DELAYED RELEASE ORAL DAILY
Qty: 30 CAPSULE | Refills: 1 | Status: SHIPPED | OUTPATIENT
Start: 2024-01-23

## 2024-01-23 NOTE — PROGRESS NOTES
Hematology and Oncology [place]  Office number [phone]    Fax number [phone]     Follow up     Date: 24    Patient Name: David Barfield  MRN: [id number]  : 1950    Referring Physician: Dr. Sampson    Chief Complaint: Nonsmall cell lung cancer follow-up on treatment    Cancer Staging:  Cancer Staging   Stage IIIA (cT2b, cN2, cM0)    History of Present Illness: David Barfield is a pleasant 73 y.o. male who presents today for evaluation of NSCLC. He has a 50 pack year smoking history.    He has a history of a PET avid subpleural RLL lung nod[facility] [place] [facility]. This had an SUV of 9.8 on PET  in association with increased mediastinal LN uptake with SUV around 3. Imaging was felt secondary to osteophyte fibrosis. He did undergo bronchoscopy 2020 with negative cytology. The RLL lung nodule was not felt amenable to bronchoscopy biopsy.    CT lung screen 2023 showed:      PET CT showed mass in the RLL intensely SUV avid, max 17. Mediastinal LN not hypermetabolic above baseline.     Right lower lobe robotic transbronchial biopsy and cytology on 9/15/2023 showed benign findings. Cultures including AFT and fungal were negative.  Station 11L, Station 4L LN negative  Station 7 LN showed NSCLCa (positive for cytokeratin 7, p63, and TTF-1 with no significant staining for p40 or Napsin. The staining pattern raises the possibility of mixed adenocarcinoma and squamous cell carcinoma differentiation in this tumor). PDL1 negative.    Tempus negative for targetable mutations on liquid biopsy. QNS on tissue at diagnosis.    MRI brain was negative.    He has a history of sick sinus syndrome s/p PPM and moderate COPD. He describes only mild physical limitations from this. For example he recently walked [facility].5 miles at [facility]. At home, he climbs 17 steps without issue. He does sit down with long activities.     Following neoadjuvant chemoimmunotherapy  he underwent lobectomy and mediastinal lymph node dissection on January 9, 2024.  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  Margins were negative.  Regional lymph nodes including 4R, 12 R, and 7 were negative as were 5 intralobar lymph nodes.    Treatment history:  Carbo, taxol, nivo, C1 10/24/23; C2 11/15/23; C3 11/21/23    Interval history:   Saw CTS yesterday fro worsening pain. CXR stable. Breathing good. Took lortab and ibuprofne this am.  Gabapentin makes him loopy  More anxious too  He is here in the company of his significant other and 2 daughters for follow-up and review of PET results.  He has been experiencing ongoing epigastric pain and dyspepsia.  He is utilizing Tums frequently with partial relief.  He is no longer taking iron and has not noted any black stools.  He has not picked up or initiated the PPI prescription that was called in for him on 11/21/2023.  His significant other did call the pharmacy during our interview and verified that the prescription is available for him to fill.  He is taking Zofran with adequate nausea relief.  He has had some intermittent diarrhea but none persistent.  The last episode was 2 days ago after eating [place].  Yesterday he had a solid bowel movement he has had no diarrhea today.  Continues to experience dyspnea on exertion.  He does have asthma and is not utilizing his inhaler.  His symptoms are not worsening but are stable since most recent chemotherapy administration.    Past Medical History:   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    Diabetes mellitus Borderline    Emphysema/COPD     Erectile disorder     GERD (gastroesophageal reflux disease)     History of chemotherapy     Hyperlipidemia     Hypertension 2019    Lung  nodule     Mumps     Mumps     Pruritus     after bath    Slow to wake up after anesthesia     Wears dentures     upper only    Wears hearing aid in both ears     usually only wears right   No personal history of myocardial infarction, cerebrovascular event, or venous thromboembolism.  No autoimmune diseases.   No prior cscope, mailed cologuard recently    Past Surgical History:   Past Surgical History:   Procedure Laterality Date    BONE BIOPSY      broken bone surgery in his face    BRONCHOSCOPY THORACOTOMY Right 1/9/2024    Procedure: THORACOTOMY FOR LOWER LOBECTOMY AND MEDISTINAL LYMPH NODE DISSECTION RIGHT;  Surgeon: Joey Patel MD;  Location:  [facility] OR;  Service: Cardiothoracic;  Laterality: Right;    BRONCHOSCOPY WITH ION ROBOTIC ASSIST N/A 09/15/2023    Procedure: BRONCHOSCOPY NAVIGATION WITH ENDOBRONCHIAL ULTRASOUND AND ION ROBOT;  Surgeon: Octaviano Sampson MD;  Location: [facility] END[facility]COPY;  Service: Robotics - Pulmonary;  Laterality: N/A;  ion #6 - 0032  - 0015  Cath guide 0061    EBUS balloon removed and intact    CARDIAC ELECTROPHYSIOLOGY PROCEDURE N/A 08/17/2021    Procedure: Pacemaker DC new;  Surgeon: Kayy Box MD;  Location:  [facility] CATH INVASIVE LOCATION;  Service: Cardiology;  Laterality: N/A;    FACIAL FRACTURE SURGERY      PACEMAKER IMPLANTATION       Family History:   Family History   Problem Relation Age of Onset    Aneurysm Mother         brain    Dementia Father     Leukemia Sister     Hypertension Paternal Grandfather     Heart disease Paternal Grandmother    Sister leukemia at age 21  No other malignancy    Social History:   Social History     Socioeconomic History    Marital status: Significant Other    Number of children: 3   Tobacco Use    Smoking status: Former     Packs/day: 1.00     Years: 53.00     Additional pack years: 0.00     Total pack years: 53.00     Types: Cigarettes     Start date: 1/1/1968    Smokeless tobacco: Never    Tobacco comments:     Pt  states that he quit smoking on 1/8/24. The day before his sx.    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    Medications:     Current Outpatient Medications:     albuterol sulfate  (90 Base) MCG/ACT inhaler, Inhale 2 puffs Every 4 (Four) Hours As Needed for Wheezing., Disp: 18 g, Rfl: 11    fluticasone (VERAMYST) 27.5 MCG/SPRAY nasal spray, 2 sprays into the nostril(s) as directed by provider 1 (One) Time As Needed for Rhinitis or Allergies., Disp: 6 mL, Rfl: 2    Fluticasone-Umeclidin-Vilant (Trelegy Ellipta) 100-62.5-25 MCG/ACT inhaler, Inhale 1 puff Daily., Disp: 3 each, Rfl: 3    HYDROcodone-acetaminophen (Norco) 7.5-325 MG per tablet, Take 1 tablet by mouth Every 6 (Six) Hours As Needed for Moderate Pain., Disp: 25 tablet, Rfl: 0    lidocaine-prilocaine (EMLA) 2.5-2.5 % cream, Apply 1 application  topically to the appropriate area as directed As Needed (45-60 minutes prior to port access.  Cover with saran/plastic wrap.)., Disp: 30 g, Rfl: 3    lisinopril (PRINIVIL,ZESTRIL) 2.5 MG tablet, Take 1 tablet by mouth As Needed (elevated blood pressure). Take 1/2 tablet po prn (Patient taking differently: Take 1 tablet by mouth As Needed (elevated blood pressure systolic over 140). Take 1/2 tablet po prn), Disp: 30 tablet, Rfl: 1    omeprazole (priLOSEC) 40 MG capsule, Take 1 capsule by mouth Daily., Disp: 30 capsule, Rfl: 5    ondansetron (ZOFRAN) 8 MG tablet, Take 1 tablet by mouth 3 (Three) Times a Day As Needed for Nausea or Vomiting., Disp: 30 tablet, Rfl: 2    pravastatin (PRAVACHOL) 80 MG tablet, Take 1 tablet by mouth Every Night., Disp: 30 tablet, Rfl: 11    sildenafil (VIAGRA) 100 MG tablet, Take 0.5 tablets by mouth Daily As Needed for Erectile  "Dysfunction., Disp: , Rfl:     tamsulosin (FLOMAX) 0.4 MG capsule 24 hr capsule, Take 1 capsule by mouth Daily., Disp: 30 capsule, Rfl: 1    Allergies:   Allergies   Allergen Reactions    Gabapentin Mental Status Change     Pt states that this medication \"makes him feel foolish in his head\".     Toradol [Ketorolac Tromethamine] GI Intolerance     Projectile vomiting     Latex Other (See Comments)     Latex allergy     Tape Rash       Objective     Vital Signs:   Vitals:    01/23/24 0956   BP: 159/98  Comment: LUE   Pulse: 88   Resp: 20   Temp: 97.3 °F (36.3 °C)   TempSrc: Infrared   SpO2: 99%  Comment: RA   Weight: 88 kg (194 lb)   Height: 182.9 cm (72\")   PainSc: 4  Comment: Right Flank    Body mass index is 26.31 kg/m².   Pain Score    01/23/24 0956   PainSc: 4  Comment: Right Flank       ECOG Performance Status: 1 - Symptomatic but completely ambulatory    Physical Exam:   General: No acute distress. Well appearing   HEENT: Normocephalic, atraumatic. Sclera anicteric.   Neck: supple, no adenopathy.   Cardiovascular: regular rate and rhythm. No murmurs.   Respiratory: Wheezing, good air movement.  Abdomen: Soft, nontender, non distended with normoactive bowel sounds  Lymph: no cervical, supraclavicular or axillary adenopathy  Neuro: Alert and oriented x 3. No focal deficits.   Ext: Symmetric, no swelling.   Skin: Port site clean dry and intact     Laboratory/Imaging Reviewed:   Lab on 01/18/2024   Component Date Value Ref Range Status    Glucose 01/18/2024 104 (H)  65 - 99 mg/dL Final    BUN 01/18/2024 10  8 - 23 mg/dL Final    Creatinine 01/18/2024 0.97  0.76 - 1.27 mg/dL Final    Sodium 01/18/2024 137  136 - 145 mmol/L Final    Potassium 01/18/2024 4.3  3.5 - 5.2 mmol/L Final    Chloride 01/18/2024 101  98 - 107 mmol/L Final    CO2 01/18/2024 24.9  22.0 - 29.0 mmol/L Final    Calcium 01/18/2024 9.4  8.6 - 10.5 mg/dL Final    BUN/Creatinine Ratio 01/18/2024 10.3  7.0 - 25.0 Final    Anion Gap 01/18/2024 11.1  5.0 " - 15.0 mmol/L Final    eGFR 01/18/2024 81.9  >60.0 mL/min/1.73 Final    WBC 01/18/2024 11.75 (H)  3.40 - 10.80 10*3/mm3 Final    RBC 01/18/2024 3.20 (L)  4.14 - 5.80 10*6/mm3 Final    Hemoglobin 01/18/2024 10.5 (L)  13.0 - 17.7 g/dL Final    Hematocrit 01/18/2024 30.7 (L)  37.5 - 51.0 % Final    MCV 01/18/2024 95.9  79.0 - 97.0 fL Final    MCH 01/18/2024 32.8  26.6 - 33.0 pg Final    MCHC 01/18/2024 34.2  31.5 - 35.7 g/dL Final    RDW 01/18/2024 15.5 (H)  12.3 - 15.4 % Final    RDW-SD 01/18/2024 53.9  37.0 - 54.0 fl Final    MPV 01/18/2024 8.6  6.0 - 12.0 fL Final    Platelets 01/18/2024 368  140 - 450 10*3/mm3 Final   Admission on 01/09/2024, Discharged on 01/15/2024   Component Date Value Ref Range Status    ABO Type 01/09/2024 A   Final    RH type 01/09/2024 Negative   Final    Antibody Screen 01/09/2024 Negative   Final    T&S Expiration Date 01/09/2024 1/12/2024 11:59:59 PM   Final    Product Code 01/13/2024 Z4583Z16   Final    Unit Number 01/13/2024 [id number]   Final    UNIT  ABO 01/13/2024 A   Final    UNIT  RH 01/13/2024 NEG   Final    Crossmatch Interpretation 01/13/2024 Compatible   Final    Dispense Status 01/13/2024 RE   Final    Blood Expiration Date 01/13/2024 170627503750   Final    Blood Type Barcode 01/13/2024 0600   Final    Product Code 01/13/2024 E5559P02   Final    Unit Number 01/13/2024 [id number]   Final    UNIT  ABO 01/13/2024 A   Final    UNIT  RH 01/13/2024 NEG   Final    Crossmatch Interpretation 01/13/2024 Compatible   Final    Dispense Status 01/13/2024 RE   Final    Blood Expiration Date 01/13/2024 [id number]   Final    Blood Type Barcode 01/13/2024 0600   Final    Glucose 01/09/2024 104  70 - 130 mg/dL Final    Case Report 01/09/2024    Final                    Value:Surgical Pathology Report                         Case: [id number]                                  Authorizing Provider:  Joey Patel MD     Collected:           01/09/2024 11:27 AM          Ordering  Location:     [facility]   Received:            01/09/2024 12:10 PM                                 OR                                                                           Pathologist:           Kurt Sultana MD                                                             Specimens:   1) - Lung, Right Lower Lobe, frozen, for bronchial margin                                           2) - Lymph Node, right, lymph node level 4                                                          3) - Lymph Node, right lymph node level 7                                                  Clinical Information 01/09/2024    Final                    Value:This result contains rich text formatting which cannot be displayed here.    Final Diagnosis 01/09/2024    Final                    Value:This result contains rich text formatting which cannot be displayed here.    Comment 01/09/2024    Final                    Value:This result contains rich text formatting which cannot be displayed here.    Intraoperative Consultation 01/09/2024    Final                    Value:This result contains rich text formatting which cannot be displayed here.    Gross Description 01/09/2024    Final                    Value:This result contains rich text formatting which cannot be displayed here.    Microscopic Description 01/09/2024    Final                    Value:This result contains rich text formatting which cannot be displayed here.    Glucose 01/09/2024 104  70 - 130 mg/dL Final    Glucose 01/10/2024 140 (H)  65 - 99 mg/dL Final    BUN 01/10/2024 11  8 - 23 mg/dL Final    Creatinine 01/10/2024 1.02  0.76 - 1.27 mg/dL Final    Sodium 01/10/2024 138  136 - 145 mmol/L Final    Potassium 01/10/2024 5.2  3.5 - 5.2 mmol/L Final    Chloride 01/10/2024 103  98 - 107 mmol/L Final    CO2 01/10/2024 24.0  22.0 - 29.0 mmol/L Final    Calcium 01/10/2024 9.0  8.6 - 10.5 mg/dL Final    BUN/Creatinine Ratio 01/10/2024 10.8  7.0 - 25.0 Final     Anion Gap 01/10/2024 11.0  5.0 - 15.0 mmol/L Final    eGFR 01/10/2024 77.1  >60.0 mL/min/1.73 Final    WBC 01/10/2024 10.02  3.40 - 10.80 10*3/mm3 Final    RBC 01/10/2024 3.53 (L)  4.14 - 5.80 10*6/mm3 Final    Hemoglobin 01/10/2024 11.2 (L)  13.0 - 17.7 g/dL Final    Hematocrit 01/10/2024 34.7 (L)  37.5 - 51.0 % Final    MCV 01/10/2024 98.3 (H)  79.0 - 97.0 fL Final    MCH 01/10/2024 31.7  26.6 - 33.0 pg Final    MCHC 01/10/2024 32.3  31.5 - 35.7 g/dL Final    RDW 01/10/2024 16.3 (H)  12.3 - 15.4 % Final    RDW-SD 01/10/2024 58.2 (H)  37.0 - 54.0 fl Final    MPV 01/10/2024 8.4  6.0 - 12.0 fL Final    Platelets 01/10/2024 280  140 - 450 10*3/mm3 Final    Neutrophil % 01/10/2024 81.5 (H)  42.7 - 76.0 % Final    Lymphocyte % 01/10/2024 10.0 (L)  19.6 - 45.3 % Final    Monocyte % 01/10/2024 8.0  5.0 - 12.0 % Final    Eosinophil % 01/10/2024 0.0 (L)  0.3 - 6.2 % Final    Basophil % 01/10/2024 0.1  0.0 - 1.5 % Final    Immature Grans % 01/10/2024 0.4  0.0 - 0.5 % Final    Neutrophils, Absolute 01/10/2024 8.17 (H)  1.70 - 7.00 10*3/mm3 Final    Lymphocytes, Absolute 01/10/2024 1.00  0.70 - 3.10 10*3/mm3 Final    Monocytes, Absolute 01/10/2024 0.80  0.10 - 0.90 10*3/mm3 Final    Eosinophils, Absolute 01/10/2024 0.00  0.00 - 0.40 10*3/mm3 Final    Basophils, Absolute 01/10/2024 0.01  0.00 - 0.20 10*3/mm3 Final    Immature Grans, Absolute 01/10/2024 0.04  0.00 - 0.05 10*3/mm3 Final    nRBC 01/10/2024 0.0  0.0 - 0.2 /100 WBC Final    Hemoglobin 01/11/2024 10.2 (L)  13.0 - 17.7 g/dL Final    Hematocrit 01/11/2024 31.1 (L)  37.5 - 51.0 % Final    Glucose 01/11/2024 110 (H)  65 - 99 mg/dL Final    BUN 01/11/2024 10  8 - 23 mg/dL Final    Creatinine 01/11/2024 0.85  0.76 - 1.27 mg/dL Final    Sodium 01/11/2024 134 (L)  136 - 145 mmol/L Final    Potassium 01/11/2024 4.7  3.5 - 5.2 mmol/L Final    Chloride 01/11/2024 97 (L)  98 - 107 mmol/L Final    CO2 01/11/2024 30.0 (H)  22.0 - 29.0 mmol/L Final    Calcium 01/11/2024 8.8  8.6 -  10.5 mg/dL Final    BUN/Creatinine Ratio 01/11/2024 11.8  7.0 - 25.0 Final    Anion Gap 01/11/2024 7.0  5.0 - 15.0 mmol/L Final    eGFR 01/11/2024 91.2  >60.0 mL/min/1.73 Final    WBC 01/12/2024 9.65  3.40 - 10.80 10*3/mm3 Final    RBC 01/12/2024 3.06 (L)  4.14 - 5.80 10*6/mm3 Final    Hemoglobin 01/12/2024 10.1 (L)  13.0 - 17.7 g/dL Final    Hematocrit 01/12/2024 30.3 (L)  37.5 - 51.0 % Final    MCV 01/12/2024 99.0 (H)  79.0 - 97.0 fL Final    MCH 01/12/2024 33.0  26.6 - 33.0 pg Final    MCHC 01/12/2024 33.3  31.5 - 35.7 g/dL Final    RDW 01/12/2024 16.4 (H)  12.3 - 15.4 % Final    RDW-SD 01/12/2024 58.7 (H)  37.0 - 54.0 fl Final    MPV 01/12/2024 8.8  6.0 - 12.0 fL Final    Platelets 01/12/2024 246  140 - 450 10*3/mm3 Final    Glucose 01/12/2024 109 (H)  65 - 99 mg/dL Final    BUN 01/12/2024 11  8 - 23 mg/dL Final    Creatinine 01/12/2024 0.81  0.76 - 1.27 mg/dL Final    Sodium 01/12/2024 133 (L)  136 - 145 mmol/L Final    Potassium 01/12/2024 5.0  3.5 - 5.2 mmol/L Final    Chloride 01/12/2024 97 (L)  98 - 107 mmol/L Final    CO2 01/12/2024 28.0  22.0 - 29.0 mmol/L Final    Calcium 01/12/2024 9.2  8.6 - 10.5 mg/dL Final    BUN/Creatinine Ratio 01/12/2024 13.6  7.0 - 25.0 Final    Anion Gap 01/12/2024 8.0  5.0 - 15.0 mmol/L Final    eGFR 01/12/2024 92.5  >60.0 mL/min/1.73 Final    QT Interval 01/12/2024 388  ms Final    QTC Interval 01/12/2024 424  ms Final    WBC 01/13/2024 7.51  3.40 - 10.80 10*3/mm3 Final    RBC 01/13/2024 2.90 (L)  4.14 - 5.80 10*6/mm3 Final    Hemoglobin 01/13/2024 9.4 (L)  13.0 - 17.7 g/dL Final    Hematocrit 01/13/2024 28.7 (L)  37.5 - 51.0 % Final    MCV 01/13/2024 99.0 (H)  79.0 - 97.0 fL Final    MCH 01/13/2024 32.4  26.6 - 33.0 pg Final    MCHC 01/13/2024 32.8  31.5 - 35.7 g/dL Final    RDW 01/13/2024 16.2 (H)  12.3 - 15.4 % Final    RDW-SD 01/13/2024 58.4 (H)  37.0 - 54.0 fl Final    MPV 01/13/2024 8.5  6.0 - 12.0 fL Final    Platelets 01/13/2024 250  140 - 450 10*3/mm3 Final    Glucose  01/13/2024 114 (H)  65 - 99 mg/dL Final    BUN 01/13/2024 21  8 - 23 mg/dL Final    Creatinine 01/13/2024 1.17  0.76 - 1.27 mg/dL Final    Sodium 01/13/2024 132 (L)  136 - 145 mmol/L Final    Potassium 01/13/2024 4.5  3.5 - 5.2 mmol/L Final    Chloride 01/13/2024 94 (L)  98 - 107 mmol/L Final    CO2 01/13/2024 30.0 (H)  22.0 - 29.0 mmol/L Final    Calcium 01/13/2024 9.0  8.6 - 10.5 mg/dL Final    BUN/Creatinine Ratio 01/13/2024 17.9  7.0 - 25.0 Final    Anion Gap 01/13/2024 8.0  5.0 - 15.0 mmol/L Final    eGFR 01/13/2024 65.4  >60.0 mL/min/1.73 Final    WBC 01/14/2024 7.15  3.40 - 10.80 10*3/mm3 Final    RBC 01/14/2024 2.96 (L)  4.14 - 5.80 10*6/mm3 Final    Hemoglobin 01/14/2024 9.6 (L)  13.0 - 17.7 g/dL Final    Hematocrit 01/14/2024 28.9 (L)  37.5 - 51.0 % Final    MCV 01/14/2024 97.6 (H)  79.0 - 97.0 fL Final    MCH 01/14/2024 32.4  26.6 - 33.0 pg Final    MCHC 01/14/2024 33.2  31.5 - 35.7 g/dL Final    RDW 01/14/2024 16.2 (H)  12.3 - 15.4 % Final    RDW-SD 01/14/2024 57.5 (H)  37.0 - 54.0 fl Final    MPV 01/14/2024 8.6  6.0 - 12.0 fL Final    Platelets 01/14/2024 280  140 - 450 10*3/mm3 Final    Glucose 01/14/2024 119 (H)  65 - 99 mg/dL Final    BUN 01/14/2024 15  8 - 23 mg/dL Final    Creatinine 01/14/2024 0.78  0.76 - 1.27 mg/dL Final    Sodium 01/14/2024 133 (L)  136 - 145 mmol/L Final    Potassium 01/14/2024 4.9  3.5 - 5.2 mmol/L Final    Chloride 01/14/2024 96 (L)  98 - 107 mmol/L Final    CO2 01/14/2024 31.0 (H)  22.0 - 29.0 mmol/L Final    Calcium 01/14/2024 8.8  8.6 - 10.5 mg/dL Final    BUN/Creatinine Ratio 01/14/2024 19.2  7.0 - 25.0 Final    Anion Gap 01/14/2024 6.0  5.0 - 15.0 mmol/L Final    eGFR 01/14/2024 93.6  >60.0 mL/min/1.73 Final    WBC 01/15/2024 6.85  3.40 - 10.80 10*3/mm3 Final    RBC 01/15/2024 3.05 (L)  4.14 - 5.80 10*6/mm3 Final    Hemoglobin 01/15/2024 9.9 (L)  13.0 - 17.7 g/dL Final    Hematocrit 01/15/2024 30.1 (L)  37.5 - 51.0 % Final    MCV 01/15/2024 98.7 (H)  79.0 - 97.0 fL  Final    MCH 01/15/2024 32.5  26.6 - 33.0 pg Final    MCHC 01/15/2024 32.9  31.5 - 35.7 g/dL Final    RDW 01/15/2024 16.4 (H)  12.3 - 15.4 % Final    RDW-SD 01/15/2024 58.9 (H)  37.0 - 54.0 fl Final    MPV 01/15/2024 8.5  6.0 - 12.0 fL Final    Platelets 01/15/2024 291  140 - 450 10*3/mm3 Final    Glucose 01/15/2024 110 (H)  65 - 99 mg/dL Final    BUN 01/15/2024 10  8 - 23 mg/dL Final    Creatinine 01/15/2024 0.83  0.76 - 1.27 mg/dL Final    Sodium 01/15/2024 135 (L)  136 - 145 mmol/L Final    Potassium 01/15/2024 4.3  3.5 - 5.2 mmol/L Final    Chloride 01/15/2024 97 (L)  98 - 107 mmol/L Final    CO2 01/15/2024 29.0  22.0 - 29.0 mmol/L Final    Calcium 01/15/2024 8.8  8.6 - 10.5 mg/dL Final    BUN/Creatinine Ratio 01/15/2024 12.0  7.0 - 25.0 Final    Anion Gap 01/15/2024 9.0  5.0 - 15.0 mmol/L Final    eGFR 01/15/2024 91.8  >60.0 mL/min/1.73 Final       XR Chest 2 View    Result Date: 1/22/2024  Narrative: XR CHEST 2 VW Date of Exam: 1/22/2024 1:23 PM CST Indication: S/P RIGHT LOWER LOBECTOMY Comparison: Chest x-ray 1/18/2024 Findings: The heart is stable in size. Cardiac pacemaker is noted. There is a left central line catheter with the distal tip overlying the distal superior vena cava. Small right apical pneumothorax is noted measuring 5 mm. There is right lower lobe volume loss and  pleural effusion. Mild left basilar atelectasis is present.     Impression: Impression: Stable appearance to a right apical pneumothorax. Right lower lobe volume loss and pleural effusion not significantly changed from prior study. Electronically Signed: Sugar Gongora MD  1/22/2024 4:27 PM CST  Workstation ID: [id number]    XR Chest 2 View    Result Date: 1/19/2024  Narrative: XR CHEST 2 VW Date of Exam: 1/18/2024 2:51 PM EST Indication: chest xray Comparison: 1/15/2024 at 1059 hours, 0924 hours, 0442 hours. Findings: The right chest tube has been removed. Residual small hydropneumothorax is noted on the right. The component of  pneumothorax measures approximately 5 mm at the apex. Atelectasis is again noted in the mid to lower lung on the right. There is also atelectasis in the left lung base. The cardiac silhouette and mediastinum are stable. The left cardiac pacemaker/AICD is noted with leads intact. The left portacatheter is stable in position. Mild residual air is seen in the soft tissues overlying the right chest related to the prior chest tube.     Impression: Impression: 1.The right chest tube has been removed. A small residual hydropneumothorax is seen on the right. The component of pneumothorax at the apex measures approximately 5 mm. Recommend follow-up to ensure resolution. 2.Residual atelectasis is noted in the right mid to lower lung and left lung base. Electronically Signed: Gabino Ruiz MD  1/19/2024 11:38 AM EST  Workstation ID: [id number]    XR Chest 1 View    Result Date: 1/15/2024  Narrative: XR CHEST 1 VW Date of Exam: 1/15/2024 10:55 AM EST Indication: f/u pneumothorax Comparison: 1/15/2024 timed 0924 hours. Findings: There is no definite right pneumothorax. Heart and pulmonary vessels appear within normal limits. Right sided pacemaker/AICD again noted. Left subclavian deep line is unchanged in position. There is mild atelectasis in the lung bases, greatest on the right, similar.     Impression: Impression: No definite pneumothorax identified after chest tube removal. Electronically Signed: Elsa Schmid MD  1/15/2024 11:24 AM EST  Workstation ID: [id number]    XR Chest 1 View    Result Date: 1/15/2024  Narrative: XR CHEST 1 VW Date of Exam: 1/15/2024 9:26 AM EST Indication: post chest tube removal Comparison: 1/15/2024 Findings: Right thoracotomy tubes have been removed. There is a faint suggestion of a pleural line in the right lateral costophrenic angle that may represent a small pneumothorax. There is probably a 1-2 mm right apical pneumothorax. Mild bibasilar discoid atelectasis appears stable. No new pulmonary  parenchymal disease is seen. Heart is upper normal size. Vasculature appears normal.     Impression: Impression: Small right apical and basilar pneumothorax following chest tube removal. Stable mild bibasilar discoid atelectasis. Electronically Signed: Baljeet Carlos MD  1/15/2024 9:38 AM EST  Workstation ID: [id number]    XR Chest 1 View    Result Date: 1/15/2024  Narrative: XR CHEST 1 VW Date of Exam: 1/15/2024 3:43 AM EST Indication: postop right thoracotomy Comparison: 1/14/2024 Findings: Right-sided dual-lead pacemaker, right thoracotomy tubes, and left-sided Port-A-Cath are again noted. Mild hazy interstitial changes of the right lung and mild left basilar discoid atelectasis appear stable. No new pulmonary parenchymal disease, evidence  of edema or pneumothorax is seen. Trace subcutaneous emphysema on the right is stable.     Impression: Impression: No new chest disease. Electronically Signed: Baljeet Carlos MD  1/15/2024 7:30 AM EST  Workstation ID: [id number]    XR Chest 1 View    Result Date: 1/14/2024  Narrative: XR CHEST 1 VW Date of Exam: 1/14/2024 5:24 AM EST Indication: postop right thoracotomy Comparison: 1/13/2024 Findings: Right-sided dual-lead pacemaker, left-sided Port-A-Cath, and right thoracotomy tubes are again noted. Minimal apical and basilar pneumothorax is again seen and appears stable. There is minimal remaining bibasilar atelectasis. No new pulmonary parenchymal  disease is seen. Heart and vasculature appear normal in size.     Impression: Impression: 1. Trace remaining right pneumothorax. 2. Minimal remaining bibasilar atelectasis; no new chest disease is seen Electronically Signed: Baljeet Carlos MD  1/14/2024 8:54 AM EST  Workstation ID: [id number]    XR Chest 1 View    Result Date: 1/13/2024  Narrative: XR CHEST 1 VW Date of Exam: 1/13/2024 1:47 AM EST Indication: postop right thoracotomy Comparison: 1/12/2024 at 0842 hours Findings: The right chest tubes are stable in position. Small residual  pneumothorax is noted. There is atelectasis within the right lower lung. Atelectasis is also noted in the left lung base. There may be a small left pleural effusion. These findings are stable. The cardiac silhouette and mediastinum are stable. A left portacatheter is stable in position. A right cardiac pacemaker/AICD is noted with leads intact..     Impression: Impression: 1.Stable positioning of the right chest tubes. Small residual pneumothorax is seen on the right. 2.Residual atelectasis is noted in the right lower lung. There is also atelectasis within the left lung base. A small left pleural effusion is seen. These findings are stable. Electronically Signed: Gabino Ruiz MD  1/13/2024 8:28 AM EST  Workstation ID: [id number]    XR Chest 1 View    Result Date: 1/12/2024  Narrative: XR CHEST 1 VW Date of Exam: 1/12/2024 7:38 AM CST Indication: dyspnea Comparison: 1/12/2024 at 3:18 a.m. Findings: Cardiomediastinal silhouette and support lines and tubes are unchanged. Stable right lung volume loss with basilar airspace disease and potential small subpulmonic pneumothorax. Left basilar airspace disease is unchanged. No acute osseous abnormality identified.     Impression: Impression: 1.Probable small right subpulmonic pneumothorax. No significant change otherwise.. Electronically Signed: Bertrand Gongora MD  1/12/2024 8:01 AM CST  Workstation ID: [id number]    XR Chest 1 View    Result Date: 1/12/2024  Narrative: XR CHEST 1 VW Date of Exam: 1/12/2024 3:03 AM EST Indication: postop right thoracotomy Comparison: 1/11/2024 Findings: 2 right-sided chest tubes are in stable position. No discrete pneumothorax. Volume loss with left right shift of the heart and mediastinum. Left-sided port catheter stable. AICD noted. Mild bibasilar atelectasis. No significant effusion.     Impression: Impression: 1. Stable right-sided chest tubes. No discrete pneumothorax. 2. Stable volume loss with left to right shift of the heart and  mediastinum. 3. Mild bibasilar atelectasis. Electronically Signed: Aldo Handley MD  1/12/2024 7:40 AM EST  Workstation ID: [id number]    XR Chest 1 View    Result Date: 1/11/2024  Narrative: XR CHEST 1 VW Date of Exam: 1/11/2024 8:12 AM EST Indication: Postop Comparison: 1/10/2024 Findings: Left approach central venous catheter with distal tip in unchanged position. Right chest wall cardiac device with distal lead tips unchanged. 2 right approach thoracostomy tubes with distal tips in stable position. Unchanged cardiomediastinal silhouette. Unchanged right basilar airspace opacities. No new focal airspace consolidation. No new pleural effusion. No visible pneumothorax. Osseous structures are unchanged.     Impression: Impression: No significant interval change. Electronically Signed: Raulito Light MD  1/11/2024 8:44 AM EST  Workstation ID: [id number]    XR Chest 1 View    Result Date: 1/10/2024  Narrative: XR CHEST 1 VW Date of Exam: 1/10/2024 1:27 AM EST Indication: postop Comparison: 1/9/2024 Findings: Postsurgical changes of the right hemithorax with 2 right-sided chest tubes in place. No discrete pneumothorax. Minimal bibasilar atelectasis. Heart size normal. No significant effusion. AICD noted.     Impression: Impression: Postsurgical changes of the right hemithorax with 2 right-sided chest tubes in place. No pneumothorax. Electronically Signed: Aldo Handley MD  1/10/2024 7:54 AM EST  Workstation ID: [id number]    XR Chest 1 View    Result Date: 1/9/2024  Narrative: XR CHEST 1 VW Date of Exam: 1/9/2024 12:58 PM EST Indication: postop Comparison: 12/29/2023. Findings: 2 right chest tubes are now present with their tips overlying the right upper lobe medially. There is no identifiable pneumothorax. Right transvenous pacemaker is unchanged in position. Left internal jugular port with tip in the mid SVC again noted. There is some mild linear atelectasis in the right lung base. The left lung is clear.    Impression:  Impression: Interval placement of 2 right chest tubes. No pneumothorax. Minimal atelectasis noted right lung base. Electronically Signed: Elsa Schmid MD  2024 1:22 PM EST  Workstation ID: [id number]    Spirometry    Result Date: 2024  Narrative: PFT interpretation PATIENT NAME:  David Barfield MRN:  [id number] :  1950; Age:  73 y.o. Impression: Available data suggest moderate obstruction.  FEV1 2.22 L, 66 % predicted.   Thanh Summers MD, [facility]    Chest X-Ray PA & Lateral    Result Date: 2023  Narrative: XR CHEST PA AND LATERAL Date of Exam: 2023 12:06 PM EST Indication: Pre-Op Cardiac Surgery Comparison: PET/CT from 2023 Findings: A right-sided pacemaker is in place. A left internal jugular port has its tip at the mid SVC. There is mild scarring at the lung apices. The lungs are clear. The heart and mediastinal contours appear normal. There is no pleural effusion. The pulmonary vasculature appears normal. The osseous structures appear intact.     Impression: Impression: No acute cardiopulmonary process. Electronically Signed: Derian Mejia MD  2023 5:47 PM EST  Workstation ID: [id number]    NM PET/CT Skull Base to Mid Thigh    Result Date: 2023  Narrative: NM PET/CT SKULL BASE TO MID THIGH Date of Exam: 2023 8:55 AM EST Indication: Non-small cell lung cancer (NSCLC), monitor post neoadjuvant. Comparison: CT chest 2023, PET/CT 2023 Technique: 13.0 mCi of F-18 FDG was administered intravenously. PET imaging was obtained from skull base to mid-thigh approximately 60 minutes after radiotracer injection. A low dose non contrast CT was obtained for attenuation correction and anatomic localization. Fused PET-CT and 3D MIP reconstructions were utilized for image interpretation.  Fasting blood glucose level: 103 mg/dl. Reference uptake values: Mediastinum: 3.2 SUVmax Liver: 3.5 SUVmax Normalization method:  Body Weight Findings: Head and neck: Normal symmetric physiologic uptake within the brain. Physiologic uptake in the oropharynx and muscles of phonation. No hypermetabolic neck mass or cervical lymph node. Chest: Decreased size and decreased hypermetabolism of right lower lobe lung malignancy. This now measures approximately 1.6 cm in maximal diameter, with SUV max 3.5, previously 2.5 cm in diameter with SUV max 17.2. No new or enlarging pulmonary nodules noting limitations of low-dose CT technique. No additional hypermetabolic pulmonary parenchymal process. Similar appearance of a few shotty right paratracheal and right supraclavicular lymph nodes demonstrating low level FDG avidity with SUV max 3.3 in the right lower paratracheal station; previously this measured SUV max 2.7. Redemonstration of cardiac pacer with right chest pulse generator. A left-sided chest port is in place. There is some physiologic uptake in the left ventricular myocardium. Abdomen and pelvis: No focal hypermetabolism to suggest primary or metastatic visceral or noah disease within the abdomen or pelvis. There is physiologic uptake in the GI and  tracts. No discrete adrenal nodule. There is sigmoid colonic diverticulosis without diverticulitis. There is heavy atherosclerosis of the abdominal aorta and iliac branches without evidence of aneurysm. Bones and body wall soft tissues: No focal or suspicious uptake in the body wall soft tissues or osseous structures. No discrete bony lesion on the CT images. No acute osseous findings. There is some linear uptake in the superficial tissues of the right antecubital fossa at the site of radiotracer injection.     Impression: Impression: Decreased size and decreased hypermetabolism of the known primary malignancy in the right lower lobe compatible with treatment response. Similar appearance of a few shotty right paratracheal and right supraclavicular lymph nodes with low-level FDG avidity, somewhat  nonspecific. No evidence of new or distant metastatic disease. Electronically Signed: Godfrey Real MD  12/27/2023 10:41 AM EST  Workstation ID: [id number]     Procedures    Bronch 6/2020  Findings:       An EBUS bronchoscope was advanced through the endotracheal tube under        general anesthesia. A comprehensive EBUS survey of all available lymph        node stations revealed lymphadenopathy with benign sonographic features        in stations 11L (9 mm), 4L (8 mm), 7 (15 mm), 4R (11 mm), and 11R (10        mm). EBUS-TBNA x 4 passes (at least) was done at each of those stations        in that sequence with the Olympus ViziShot 21G and 22G needles.       We then withdrew the EBUS scope and inserted a therapeutic scope into        the airway. A thorough airway exam to the first subsegmental level was        unremarkable without endobronchial lesions or secretions. A BAL was        obtained from the right lower lobe superior segment (RB6) (90 ml        instilled, 18 ml of cloudy fluid returned). Adequate hemostasis was        confirmed prior to withdrawing the scope. Patient tolerated procedure.  Impression:           Abnormal CT scan of chest                        1. Successful endobronchial ultrasound bronchoscopy                         with fine needle aspiration.                        2. Mediastinal/hilar lymphadenopathy with benign                         sonographic features in stations 11L, 4L, 7, 4R and 11R.                        3. EBUS-TBNA samples from stations 11L, 4L, 7, 4R and                         11R.                        4. Normal airway anatomy without active bleeding,                         endobronchial lesions or secretions.                        5. BAL from RLL     11R Lymph Node, (EBUS) Fine Needle Aspiration:  - Negative for malignancy  - Abundant lymphoid tissue and histiocytes with anthracotic pigments.     4R Lymph Node, (EBUS) Fine Needle Aspiration:  - Negative for  malignancy.  - Abundant lymphoid tissue present.     Subcarinal Lymph Node, (EBUS) Fine Needle Aspiration:  - Negative for malignancy  - Abundant lymphoid tissue present.     4L Lymph Node, (EBUS) Fine Needle Aspiration:  - Negative for malignancy  - Abundant lymphoid tissue.     11L Lymph Node, (EBUS) Fine Needle Aspiration:  - Negative for malignancy.  - Abundant lymphoid tissue and histiocytes with anthracotic pigments.     Bronchoalveolar Lavage, RLL (ThinPrep only):  NO MALIGNANT CELLS IDENTIFIED  Benign respiratory epithelial cells and pulmonary macrophages       Microbiology Results     Date and Time Order Name Sensitivity Status Organisms Specimen ID Source     2020 1030 Fungus Culture and Smear   Preliminary   [id number] Lung     2020 1030 BAL/Brush Culture plus Stain, [place].   Final   [id number] Lung     2020 1030 Acid Fast Culture Plus Stain   Preliminary   [id number] Lung   TRANSTHORACIC ECHOCARDIOGRAPHY REPORT   Demographics   Patient Name:          RODGER ROB  :            1950   Medical Record Number: [id number]          Age:            73 year(s)   Corporate ID Number:   [id number]          Gender          Male   Account Number:        [id number]   Sonographer:           Faiza TREJO Height:         70 inches   Referring Physician:   JOHN NAVAS  Weight:         194.01 pounds   Interpreting           MEDARDO OBRIEN MD        BMI:            27.84 kg/m^2   Physician:   Date of Service:       2023          Blood Pressure: 168/100 mmHg   Room Number:           OP  Type of Study:   TTE procedure: ECHO COMPLETE (DOPPLER / COLOR) W OR WO CONTRAST.   Patient Status: Routine OP   Study Location: Echo LabTechnical Quality: Adequate visualization   Impression:   Indication: Other forms of chronic ischemic heart disease I25.89   ########################################   Normal sized left ventricle. Moderate left ventricular hypertrophy.   Visually estimated ejection  fraction 55% +/- 5%. Abnormal systolic strain   pattern. Normal left ventricular diastolic function.   No significant valvular heart disease.   ########################################  Measurements Summary:   LVEDd: 4.16 cm         LVESd: 3.03 cm          IVSEd: 1.45 cm   AO Root:3.76 cm        LVPWd: 1.38 cm   Contractility Score   At rest the following contractility abnormalities were noted: Hypokinesis   of the Basal infero-lateral, the Basal jalen-septal, the Basal   infero-septal, the Basal inferior and the Basal jalen-lateral segments.   Contractility of all other segments appeared normal.   LV regional wall motion: (0-Not visualized 1-Normal 2-Hypokinesis   3-Akinesis 4-Dyskinesis 5-Aneurysm)   Left Ventricle   Peak E-wave: 0.48   Peak A-wave: 0.62 m/s   E/A ratio: 0.78   m/s                 Volume [id number]   LV length: 8.71 cm   ml   Volume arckydig19.24 ml   LVOT diameter: 2.41 cm   Normal sized left ventricle.   Moderate left ventricular hypertrophy.   Visually estimated ejection fraction 55% +/- 5%.   Abnormal systolic strain pattern.   Normal left ventricular diastolic function.   No left ventricular masses or thrombi.   Right Ventricle   Diastolic dimension: 3.05     RV systolic pressure: 21.97 mmHg   cm   Normal sized right ventricle.   Pacing electrode in the right ventricle.   Normal right ventricular function.   Left Atrium   LA dimension: 3.34 cm               LA volume:52.02 ml   LA/Aorta: 0.89   Normal sized left atrium.   Normal left atrial volume index   Intact atrial septum.   No atrial mass or thrombus.   Right Atrium   Normal sized right atrium.   Pacer wire crosses the tricuspid valve.   Intact atrial septum.   No atrial mass or thrombus.   Mitral Valve   Deceleration time: 197.82 msec   Thickened mitral valve leaflets.   Trace mitral regurgitation.   No mitral stenosis.   No masses or vegetations seen.   Aortic Valve   AI P1/2t: 674.17 msec   LVOT VTI: 18.35 cm          Deceleration time: 2324.74 msec   Mildly thickend free edges of the aortic valve leaflets.   Mild aortic valve regurgitation.   No aortic stenosis.   No masses or vegetations seen.   Tricuspid Valve   TR velocity: 2.18 m/s     TR gradient: 18.51448 mmHg   Estimated RAP: 3 mmHg     RVSP: 21.97 mmHg   Structurally normal tricuspid valve.   Trace tricuspid regurgitation.   No tricuspid stenosis.   No masses or vegetations seen.   Pulmonic Valve   Acceleration time: 89.97 msec           PASP: 21.97 mmHg   Structurally normal pulmonic valve.   Abnormal pulmonary acceleration time.   No pulmonic regurgitation.   No pulmonic stenosis.   No masses or vegetations seen.  Great Vessels  Aorta   Aortic Root: 3.76 cm   Ascending Aorta: 3.71 cm   LVOT Diameter: 2.41 cm  Visualized aorta is normal.  Mildly dilated aortic root 3.8 cm.  No evidence of dissection.  Normal IVC with appropriate collapse.   Pericardium / Pleura  No pericardial effusion.   ----------------------------------------------------------------   Electronically signed by MEDARDO OBRIEN MD(Interpreting Physician)   on 2023 16:31   ----------------------------------------------------------------  Procedure Note    Medardo Obrien MD - 2023  Formatting of this note might be different from the original.  TRANSTHORACIC ECHOCARDIOGRAPHY REPORT   Demographics   Patient Name:          RODGER ROB  :            1950   Medical Record Number: [id number]          Age:            73 year(s)   Corporate ID Number:   [id number]          Gender          Male   Account Number:        [id number]   Sonographer:           Faiza TREJO Height:         70 inches   Referring Physician:   JOHN NAVAS  Weight:         194.01 pounds   Interpreting           MEDARDO OBRIEN MD        BMI:            27.84 kg/m^2   Physician:   Date of Service:       2023          Blood Pressure: 168/100 mmHg   Room Number:           OP  Type of Study:   TTE procedure:  ECHO COMPLETE (DOPPLER / COLOR) W OR WO CONTRAST.   Patient Status: Routine OP   Study Location: Echo LabTechnical Quality: Adequate visualization   Impression:   Indication: Other forms of chronic ischemic heart disease I25.89   ########################################   Normal sized left ventricle. Moderate left ventricular hypertrophy.   Visually estimated ejection fraction 55% +/- 5%. Abnormal systolic strain   pattern. Normal left ventricular diastolic function.   No significant valvular heart disease.   ########################################  Measurements Summary:   LVEDd: 4.16 cm         LVESd: 3.03 cm          IVSEd: 1.45 cm   AO Root:3.76 cm        LVPWd: 1.38 cm   Contractility Score   At rest the following contractility abnormalities were noted: Hypokinesis   of the Basal infero-lateral, the Basal jalen-septal, the Basal   infero-septal, the Basal inferior and the Basal jalen-lateral segments.   Contractility of all other segments appeared normal.   LV regional wall motion: (0-Not visualized 1-Normal 2-Hypokinesis   3-Akinesis 4-Dyskinesis 5-Aneurysm)   Left Ventricle   Peak E-wave: 0.48   Peak A-wave: 0.62 m/s   E/A ratio: 0.78   m/s                 Volume ebyqqjllx72.99   LV length: 8.71 cm   ml   Volume flqvekzy92.24 ml   LVOT diameter: 2.41 cm   Normal sized left ventricle.   Moderate left ventricular hypertrophy.   Visually estimated ejection fraction 55% +/- 5%.   Abnormal systolic strain pattern.   Normal left ventricular diastolic function.   No left ventricular masses or thrombi.   Right Ventricle   Diastolic dimension: 3.05     RV systolic pressure: 21.97 mmHg   cm   Normal sized right ventricle.   Pacing electrode in the right ventricle.   Normal right ventricular function.   Left Atrium   LA dimension: 3.34 cm               LA volume:52.02 ml   LA/Aorta: 0.89   Normal sized left atrium.   Normal left atrial volume index   Intact atrial septum.   No atrial mass or thrombus.   Right Atrium    Normal sized right atrium.   Pacer wire crosses the tricuspid valve.   Intact atrial septum.   No atrial mass or thrombus.   Mitral Valve   Deceleration time: 197.82 msec   Thickened mitral valve leaflets.   Trace mitral regurgitation.   No mitral stenosis.   No masses or vegetations seen.   Aortic Valve   AI P1/2t: 674.17 msec   LVOT VTI: 18.35 cm         Deceleration time: 2324.74 msec   Mildly thickend free edges of the aortic valve leaflets.   Mild aortic valve regurgitation.   No aortic stenosis.   No masses or vegetations seen.   Tricuspid Valve   TR velocity: 2.18 m/s     TR gradient: 18.02292 mmHg   Estimated RAP: 3 mmHg     RVSP: 21.97 mmHg   Structurally normal tricuspid valve.   Trace tricuspid regurgitation.   No tricuspid stenosis.   No masses or vegetations seen.   Pulmonic Valve   Acceleration time: 89.97 msec           PASP: 21.97 mmHg   Structurally normal pulmonic valve.   Abnormal pulmonary acceleration time.   No pulmonic regurgitation.   No pulmonic stenosis.   No masses or vegetations seen.  Great Vessels  Aorta   Aortic Root: 3.76 cm   Ascending Aorta: 3.71 cm   LVOT Diameter: 2.41 cm  Visualized aorta is normal.  Mildly dilated aortic root 3.8 cm.  No evidence of dissection.  Normal IVC with appropriate collapse.   Pericardium / Pleura  No pericardial effusion.  Assessment / Plan      Assessment/Plan:     Nonsmall cell lung cancer of the RLL, Stage IIIA  -He has an enlarging RLL nodule with SUV of 17. Despite negative transbronchial biopsy, he was found to have N2 disease with a positive level 7 LN. We discussed options for definitive treatment to include induction chemo immunotherapy followed by surgical resection, chemoradiation followed by surgical resection, or definitive chemoradiation. We discussed differences in schedules and side effects. He has no contraindications to immunotherapy and I recommended nivolumab + chemotherapy induction.  His case was reviewed at multidisciplinary tumor  board including thoracic surgery.  He was felt to be a good candidate for neoadjuvant chemo immunotherapy followed by resection assessment.  He completed 3 cycles of  nivolumab, carboplatin, paclitaxel x3 cycles in a neoadjuvant fashion.  He underwent right lower lobectomy and lymph node dissection.  -Final pathology reviewed and shows 1.4 cm of residual disease with extensive treatment effect.  The previously biopsied lymph node was negative on mediastinal dissection.  -His case was reviewed at multidisciplinary tumor board last week and consensus was to proceed with adjuvant immunotherapy without any indication for further chemotherapy or adjuvant radiation.  -Because his original bronchoscopy specimen was QNS for Tempus tissue testing, I am going to repeat Tempus on the resection specimen to rule out any EGFR or ALK mutation.  There was no mutation on liquid biopsy.  -Plan to proceed with adjuvant immunotherapy in 2 to 3 weeks following further recovery from surgery.  -Order placed for immunotherapy. RTC in 3 weeks for treatment start.    3.  Postoperative neuropathic pain  4.  Depression anxiety  Reviewed his recent chest x-ray showing no expansion of postoperative pneumothorax.  Reviewed CT surgery notes.  He would like to trial Cymbalta for situational anxiety as well as may help with his neuropathic pain    5. Access  -Port    Follow Up:   2-3 weeks with treatment start     Neetu Ashley MD  Hematology and Oncology

## 2024-01-29 ENCOUNTER — TELEPHONE (OUTPATIENT)
Dept: ONCOLOGY | Facility: CLINIC | Age: 74
End: 2024-01-29
Payer: MEDICARE

## 2024-01-29 NOTE — TELEPHONE ENCOUNTER
Caller: ISAÍAS ANDERSON    Relationship: WIFE    Best call back number: [phone]    What is the best time to reach you: ANYTIME    Who are you requesting to speak with (clinical staff, provider,  specific staff member): DR. NOWAK     Do you know the name of the person who called: ISAÍAS    What was the call regarding: PATIENT WOULD LIKE TO TALK TO DR. NOWAK (WITH THE XRAY PULLED UP FROM 1/18/2024 BY DR. JEFFREY) WHAT SHE IS SEEING ON THE XRAY IT IS NOT SHOWING THAT THE LOWER LOBE ON THE RIGHT SIDE WAS REMOVED. THE CANCER THAT WAS GLOWING IN THE PET SCAN WAS IN THE UPPER AS WAS  NOT REMOVED.    ALSO PATIENT CANNOT TAKE THE CYMBALTA WAS ALLERGIC TO IT.     WOULD LIKE 5 MINS WITH DR. NOWAK  IN PERSON ON 1/30/2024 AROUND 9:30 TO DISCUSS THE XRAY AS WELL AS THE  LAB RESULTS WANTS TO SEE IF HIS WBC, RBC, HEMOCRIT & HEMO. DOES NOT WANT RADU PRESENT HE IS VERY DEPRESSED.    Is it okay if the provider responds through MyChart:

## 2024-01-29 NOTE — TELEPHONE ENCOUNTER
Spoke with Balaji, on verbal release.  Notified her that Dr. Ashley cannot see her without patient and if both agree, Dr. Ashley can see him tomorrow.  Dr. Ashley notified and is aware and agreed with appointment but she advised and it was advised to both patient and Balaji that patient has to be present for appointment.  Spoke with patient and he agreed.  Patient stated he has no SI and he does not want to try a different antidepressant.  Balaji stated patient had projectile vomiting about 30 minutes after taking Cymbalta but he is better now.  Appointment made with Dr. Ashley for tomorrow.

## 2024-01-30 ENCOUNTER — OFFICE VISIT (OUTPATIENT)
Dept: ONCOLOGY | Facility: CLINIC | Age: 74
End: 2024-01-30
Payer: MEDICARE

## 2024-01-30 VITALS
BODY MASS INDEX: 26.01 KG/M2 | SYSTOLIC BLOOD PRESSURE: 105 MMHG | TEMPERATURE: 98.4 F | OXYGEN SATURATION: 100 % | DIASTOLIC BLOOD PRESSURE: 62 MMHG | HEART RATE: 83 BPM | WEIGHT: 192 LBS | HEIGHT: 72 IN

## 2024-01-30 DIAGNOSIS — C34.31 MALIGNANT NEOPLASM OF LOWER LOBE OF RIGHT LUNG: Primary | ICD-10-CM

## 2024-01-30 NOTE — PROGRESS NOTES
[facility]  Office number [phone]    Fax number [phone]     Follow up     Date: 24    Patient Name: David Barfield  MRN: [id number]  : 1950    Referring Physician: Dr. Sampson    Chief Complaint: Nonsmall cell lung cancer follow-up on treatment    Cancer Staging:  Cancer Staging   Stage IIIA (cT2b, cN2, cM0)    History of Present Illness: David Barfield is a pleasant 73 y.o. male who presents today for evaluation of NSCLC. He has a 50 pack year smoking history.    He has a history of a PET avid subpleural RLL lung nod[facility] [place] [facility]. This had an SUV of 9.8 on PET  in association with increased mediastinal LN uptake with SUV around 3. Imaging was felt secondary to osteophyte fibrosis. He did undergo bronchoscopy 2020 with negative cytology. The RLL lung nodule was not felt amenable to bronchoscopy biopsy.    CT lung screen 2023 showed:      PET CT showed mass in the RLL intensely SUV avid, max 17. Mediastinal LN not hypermetabolic above baseline.     Right lower lobe robotic transbronchial biopsy and cytology on 9/15/2023 showed benign findings. Cultures including AFT and fungal were negative.  Station 11L, Station 4L LN negative  Station 7 LN showed NSCLCa (positive for cytokeratin 7, p63, and TTF-1 with no significant staining for p40 or Napsin. The staining pattern raises the possibility of mixed adenocarcinoma and squamous cell carcinoma differentiation in this tumor). PDL1 negative.    Tempus negative for targetable mutations on liquid biopsy. QNS on tissue at diagnosis.    MRI brain was negative.    He has a history of sick sinus syndrome s/p PPM and moderate COPD. He describes only mild physical limitations from this. For example he recently walked 1.5 miles at [facility]. At home, he climbs 17 steps without issue. He does sit down with long activities.     Following neoadjuvant chemoimmunotherapy  he underwent lobectomy and mediastinal lymph node dissection on January 9, 2024.  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  Margins were negative.  Regional lymph nodes including 4R, 12 R, and 7 were negative as were 5 intralobar lymph nodes.    Treatment history:  Carbo, taxol, nivo, C1 10/24/23; C2 11/15/23; C3 11/21/23    Interval history:   He presents with his supportive significant other with multiple questions regarding his diagnostic imaging from diagnosis and posttreatment and pathology results.  They also have questions regarding his recent surgery as his significant other expresses concern as to whether the abnormality was really located in the right lower lobe and whether the cancer has been removed with surgery.  There was also some question as to his pathology report as it referenced his original lymph node biopsy in the synoptic staging.  He has been weaning off of pain medication and feeling better.  He had nausea and vomiting after initiating Cymbalta and has discontinued this.  He is not needing pain medication frequently anymore.  His energy is still low but improving.  They are hoping to get away for a couple of weeks in February for a trip.     Past Medical History:   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    Diabetes mellitus Borderline    Emphysema/COPD     Erectile disorder     GERD (gastroesophageal reflux disease)     History of chemotherapy     Hyperlipidemia     Hypertension 2019    Lung nodule     Mumps     Mumps     Pruritus     after bath    Slow to wake up after anesthesia     Wears dentures     upper only    Wears hearing aid in both ears     usually only wears right   No personal history of myocardial infarction, cerebrovascular event, or  venous thromboembolism.  No autoimmune diseases.   No prior cscope, mailed cologuard recently    Past Surgical History:   Past Surgical History:   Procedure Laterality Date    BONE BIOPSY      broken bone surgery in his face    BRONCHOSCOPY THORACOTOMY Right 1/9/2024    Procedure: THORACOTOMY FOR LOWER LOBECTOMY AND MEDISTINAL LYMPH NODE DISSECTION RIGHT;  Surgeon: Joey Patel MD;  Location:  [facility];  Service: Cardiothoracic;  Laterality: Right;    BRONCHOSCOPY WITH ION ROBOTIC ASSIST N/A 09/15/2023    Procedure: BRONCHOSCOPY NAVIGATION WITH ENDOBRONCHIAL ULTRASOUND AND ION ROBOT;  Surgeon: Octaviano Sampson MD;  Location:  [facility];  Service: Robotics - Pulmonary;  Laterality: N/A;  ion #6 - [id number]  - 0015  Cath guide 0061    EBUS balloon removed and intact    CARDIAC ELECTROPHYSIOLOGY PROCEDURE N/A 08/17/2021    Procedure: Pacemaker DC new;  Surgeon: Kayy Box MD;  Location:  [facility];  Service: Cardiology;  Laterality: N/A;    FACIAL FRACTURE SURGERY      PACEMAKER IMPLANTATION       Family History:   Family History   Problem Relation Age of Onset    Aneurysm Mother         brain    Dementia Father     Leukemia Sister     Hypertension Paternal Grandfather     Heart disease Paternal Grandmother    Sister leukemia at age 21  No other malignancy    Social History:   Social History     Socioeconomic History    Marital status: Significant Other    Number of children: 3   Tobacco Use    Smoking status: Former     Packs/day: 1.00     Years: 53.00     Additional pack years: 0.00     Total pack years: 53.00     Types: Cigarettes     Start date: 1/1/1968    Smokeless tobacco: Never    Tobacco comments:     Pt states that he quit smoking on 1/8/24. The day before his sx.    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    Medications:     Current Outpatient Medications:     albuterol sulfate  (90 Base) MCG/ACT inhaler, Inhale 2 puffs Every 4 (Four) Hours As Needed for Wheezing., Disp: 18 g, Rfl: 11    fluticasone (VERAMYST) 27.5 MCG/SPRAY nasal spray, 2 sprays into the nostril(s) as directed by provider 1 (One) Time As Needed for Rhinitis or Allergies., Disp: 6 mL, Rfl: 2    Fluticasone-Umeclidin-Vilant (Trelegy Ellipta) 100-62.5-25 MCG/ACT inhaler, Inhale 1 puff Daily., Disp: 3 each, Rfl: 3    HYDROcodone-acetaminophen (Norco) 7.5-325 MG per tablet, Take 1 tablet by mouth Every 6 (Six) Hours As Needed for Moderate Pain., Disp: 25 tablet, Rfl: 0    lidocaine-prilocaine (EMLA) 2.5-2.5 % cream, Apply 1 application  topically to the appropriate area as directed As Needed (45-60 minutes prior to port access.  Cover with saran/plastic wrap.)., Disp: 30 g, Rfl: 3    lisinopril (PRINIVIL,ZESTRIL) 2.5 MG tablet, Take 1 tablet by mouth As Needed (elevated blood pressure). Take 1/2 tablet po prn (Patient taking differently: Take 1 tablet by mouth As Needed (elevated blood pressure systolic over 140). Take 1/2 tablet po prn), Disp: 30 tablet, Rfl: 1    omeprazole (priLOSEC) 40 MG capsule, Take 1 capsule by mouth Daily., Disp: 30 capsule, Rfl: 5    ondansetron (ZOFRAN) 8 MG tablet, Take 1 tablet by mouth 3 (Three) Times a Day As Needed for Nausea or Vomiting., Disp: 30 tablet, Rfl: 2    pravastatin (PRAVACHOL) 80 MG tablet, Take 1 tablet by mouth Every Night., Disp: 30 tablet, Rfl: 11    sildenafil (VIAGRA) 100 MG tablet, Take 0.5 tablets by mouth Daily As Needed for Erectile Dysfunction., Disp: , Rfl:     tamsulosin (FLOMAX) 0.4 MG capsule 24 hr capsule, Take 1 capsule by mouth Daily., Disp: 30 capsule, Rfl: 1    Allergies:   Allergies   Allergen Reactions    Cymbalta [Duloxetine Hcl] GI Intolerance    Gabapentin Mental Status Change     " Pt states that this medication \"makes him feel foolish in his head\".     Toradol [Ketorolac Tromethamine] GI Intolerance     Projectile vomiting     Latex Other (See Comments)     Latex allergy     Tape Rash       Objective     Vital Signs:   Vitals:    01/30/24 1434   BP: 105/62   Pulse: 83   Temp: 98.4 °F (36.9 °C)   TempSrc: Temporal   SpO2: 100%   Weight: 87.1 kg (192 lb)   Height: 182.9 cm (72.01\")   PainSc: 0-No pain    Body mass index is 26.03 kg/m².   Pain Score    01/30/24 1434   PainSc: 0-No pain       ECOG Performance Status: 1 - Symptomatic but completely ambulatory    Physical Exam:   General: No acute distress. Well appearing   HEENT: Normocephalic, atraumatic. Sclera anicteric.   Neck: supple, no adenopathy.   Cardiovascular: regular rate and rhythm. No murmurs.   Respiratory: Wheezing, good air movement.  Abdomen: Soft, nontender, non distended with normoactive bowel sounds  Lymph: no cervical, supraclavicular or axillary adenopathy  Neuro: Alert and oriented x 3. No focal deficits.   Ext: Symmetric, no swelling.   Skin: Port site clean dry and intact. Incision is healing well.    Laboratory/Imaging Reviewed:   Lab on 01/18/2024   Component Date Value Ref Range Status    Glucose 01/18/2024 104 (H)  65 - 99 mg/dL Final    BUN 01/18/2024 10  8 - 23 mg/dL Final    Creatinine 01/18/2024 0.97  0.76 - 1.27 mg/dL Final    Sodium 01/18/2024 137  136 - 145 mmol/L Final    Potassium 01/18/2024 4.3  3.5 - 5.2 mmol/L Final    Chloride 01/18/2024 101  98 - 107 mmol/L Final    CO2 01/18/2024 24.9  22.0 - 29.0 mmol/L Final    Calcium 01/18/2024 9.4  8.6 - 10.5 mg/dL Final    BUN/Creatinine Ratio 01/18/2024 10.3  7.0 - 25.0 Final    Anion Gap 01/18/2024 11.1  5.0 - 15.0 mmol/L Final    eGFR 01/18/2024 81.9  >60.0 mL/min/1.73 Final    WBC 01/18/2024 11.75 (H)  3.40 - 10.80 10*3/mm3 Final    RBC 01/18/2024 3.20 (L)  4.14 - 5.80 10*6/mm3 Final    Hemoglobin 01/18/2024 10.5 (L)  13.0 - 17.7 g/dL Final    Hematocrit " 01/18/2024 30.7 (L)  37.5 - 51.0 % Final    MCV 01/18/2024 95.9  79.0 - 97.0 fL Final    MCH 01/18/2024 32.8  26.6 - 33.0 pg Final    MCHC 01/18/2024 34.2  31.5 - 35.7 g/dL Final    RDW 01/18/2024 15.5 (H)  12.3 - 15.4 % Final    RDW-SD 01/18/2024 53.9  37.0 - 54.0 fl Final    MPV 01/18/2024 8.6  6.0 - 12.0 fL Final    Platelets 01/18/2024 368  140 - 450 10*3/mm3 Final       XR Chest 2 View    Result Date: 1/22/2024  Narrative: XR CHEST 2 VW Date of Exam: 1/22/2024 1:23 PM CST Indication: S/P RIGHT LOWER LOBECTOMY Comparison: Chest x-ray 1/18/2024 Findings: The heart is stable in size. Cardiac pacemaker is noted. There is a left central line catheter with the distal tip overlying the distal superior vena cava. Small right apical pneumothorax is noted measuring 5 mm. There is right lower lobe volume loss and  pleural effusion. Mild left basilar atelectasis is present.     Impression: Impression: Stable appearance to a right apical pneumothorax. Right lower lobe volume loss and pleural effusion not significantly changed from prior study. Electronically Signed: Sugar Gongora MD  1/22/2024 4:27 PM CST  Workstation ID: [id number]    XR Chest 2 View    Result Date: 1/19/2024  Narrative: XR CHEST 2 VW Date of Exam: 1/18/2024 2:51 PM EST Indication: chest xray Comparison: 1/15/2024 at 1059 hours, 0924 hours, 0442 hours. Findings: The right chest tube has been removed. Residual small hydropneumothorax is noted on the right. The component of pneumothorax measures approximately 5 mm at the apex. Atelectasis is again noted in the mid to lower lung on the right. There is also atelectasis in the left lung base. The cardiac silhouette and mediastinum are stable. The left cardiac pacemaker/AICD is noted with leads intact. The left portacatheter is stable in position. Mild residual air is seen in the soft tissues overlying the right chest related to the prior chest tube.     Impression: Impression: 1.The right chest tube has been  removed. A small residual hydropneumothorax is seen on the right. The component of pneumothorax at the apex measures approximately 5 mm. Recommend follow-up to ensure resolution. 2.Residual atelectasis is noted in the right mid to lower lung and left lung base. Electronically Signed: Gabino Ruiz MD  1/19/2024 11:38 AM EST  Workstation ID: [id number]    XR Chest 1 View    Result Date: 1/15/2024  Narrative: XR CHEST 1 VW Date of Exam: 1/15/2024 10:55 AM EST Indication: f/u pneumothorax Comparison: 1/15/2024 timed 0924 hours. Findings: There is no definite right pneumothorax. Heart and pulmonary vessels appear within normal limits. Right sided pacemaker/AICD again noted. Left subclavian deep line is unchanged in position. There is mild atelectasis in the lung bases, greatest on the right, similar.     Impression: Impression: No definite pneumothorax identified after chest tube removal. Electronically Signed: Elsa Schmid MD  1/15/2024 11:24 AM EST  Workstation ID: [id number]    XR Chest 1 View    Result Date: 1/15/2024  Narrative: XR CHEST 1 VW Date of Exam: 1/15/2024 9:26 AM EST Indication: post chest tube removal Comparison: 1/15/2024 Findings: Right thoracotomy tubes have been removed. There is a faint suggestion of a pleural line in the right lateral costophrenic angle that may represent a small pneumothorax. There is probably a 1-2 mm right apical pneumothorax. Mild bibasilar discoid atelectasis appears stable. No new pulmonary parenchymal disease is seen. Heart is upper normal size. Vasculature appears normal.     Impression: Impression: Small right apical and basilar pneumothorax following chest tube removal. Stable mild bibasilar discoid atelectasis. Electronically Signed: Baljeet Carlos MD  1/15/2024 9:38 AM EST  Workstation ID: [id number]    XR Chest 1 View    Result Date: 1/15/2024  Narrative: XR CHEST 1 VW Date of Exam: 1/15/2024 3:43 AM EST Indication: postop right thoracotomy Comparison: 1/14/2024 Findings:  Right-sided dual-lead pacemaker, right thoracotomy tubes, and left-sided Port-A-Cath are again noted. Mild hazy interstitial changes of the right lung and mild left basilar discoid atelectasis appear stable. No new pulmonary parenchymal disease, evidence  of edema or pneumothorax is seen. Trace subcutaneous emphysema on the right is stable.     Impression: Impression: No new chest disease. Electronically Signed: Baljeet Carlos MD  1/15/2024 7:30 AM EST  Workstation ID: [id number]    XR Chest 1 View    Result Date: 1/14/2024  Narrative: XR CHEST 1 VW Date of Exam: 1/14/2024 5:24 AM EST Indication: postop right thoracotomy Comparison: 1/13/2024 Findings: Right-sided dual-lead pacemaker, left-sided Port-A-Cath, and right thoracotomy tubes are again noted. Minimal apical and basilar pneumothorax is again seen and appears stable. There is minimal remaining bibasilar atelectasis. No new pulmonary parenchymal  disease is seen. Heart and vasculature appear normal in size.     Impression: Impression: 1. Trace remaining right pneumothorax. 2. Minimal remaining bibasilar atelectasis; no new chest disease is seen Electronically Signed: Baljeet Carlos MD  1/14/2024 8:54 AM EST  Workstation ID: [id number]    XR Chest 1 View    Result Date: 1/13/2024  Narrative: XR CHEST 1 VW Date of Exam: 1/13/2024 1:47 AM EST Indication: postop right thoracotomy Comparison: 1/12/2024 at 0842 hours Findings: The right chest tubes are stable in position. Small residual pneumothorax is noted. There is atelectasis within the right lower lung. Atelectasis is also noted in the left lung base. There may be a small left pleural effusion. These findings are stable. The cardiac silhouette and mediastinum are stable. A left portacatheter is stable in position. A right cardiac pacemaker/AICD is noted with leads intact..     Impression: Impression: 1.Stable positioning of the right chest tubes. Small residual pneumothorax is seen on the right. 2.Residual atelectasis is  noted in the right lower lung. There is also atelectasis within the left lung base. A small left pleural effusion is seen. These findings are stable. Electronically Signed: Gabino Ruiz MD  1/13/2024 8:28 AM EST  Workstation ID: [id number]    XR Chest 1 View    Result Date: 1/12/2024  Narrative: XR CHEST 1 VW Date of Exam: 1/12/2024 7:38 AM CST Indication: dyspnea Comparison: 1/12/2024 at 3:18 a.m. Findings: Cardiomediastinal silhouette and support lines and tubes are unchanged. Stable right lung volume loss with basilar airspace disease and potential small subpulmonic pneumothorax. Left basilar airspace disease is unchanged. No acute osseous abnormality identified.     Impression: Impression: 1.Probable small right subpulmonic pneumothorax. No significant change otherwise.. Electronically Signed: Bertrand Gongora MD  1/12/2024 8:01 AM CST  Workstation ID: [id number]    XR Chest 1 View    Result Date: 1/12/2024  Narrative: XR CHEST 1 VW Date of Exam: 1/12/2024 3:03 AM EST Indication: postop right thoracotomy Comparison: 1/11/2024 Findings: 2 right-sided chest tubes are in stable position. No discrete pneumothorax. Volume loss with left right shift of the heart and mediastinum. Left-sided port catheter stable. AICD noted. Mild bibasilar atelectasis. No significant effusion.     Impression: Impression: 1. Stable right-sided chest tubes. No discrete pneumothorax. 2. Stable volume loss with left to right shift of the heart and mediastinum. 3. Mild bibasilar atelectasis. Electronically Signed: Aldo Handley MD  1/12/2024 7:40 AM EST  Workstation ID: [id number]    XR Chest 1 View    Result Date: 1/11/2024  Narrative: XR CHEST 1 VW Date of Exam: 1/11/2024 8:12 AM EST Indication: Postop Comparison: 1/10/2024 Findings: Left approach central venous catheter with distal tip in unchanged position. Right chest wall cardiac device with distal lead tips unchanged. 2 right approach thoracostomy tubes with distal tips in stable position.  Unchanged cardiomediastinal silhouette. Unchanged right basilar airspace opacities. No new focal airspace consolidation. No new pleural effusion. No visible pneumothorax. Osseous structures are unchanged.     Impression: Impression: No significant interval change. Electronically Signed: Raulito Light MD  1/11/2024 8:44 AM EST  Workstation ID: [id number]    XR Chest 1 View    Result Date: 1/10/2024  Narrative: XR CHEST 1 VW Date of Exam: 1/10/2024 1:27 AM EST Indication: postop Comparison: 1/9/2024 Findings: Postsurgical changes of the right hemithorax with 2 right-sided chest tubes in place. No discrete pneumothorax. Minimal bibasilar atelectasis. Heart size normal. No significant effusion. AICD noted.     Impression: Impression: Postsurgical changes of the right hemithorax with 2 right-sided chest tubes in place. No pneumothorax. Electronically Signed: Aldo Handley MD  1/10/2024 7:54 AM EST  Workstation ID: [id number]    XR Chest 1 View    Result Date: 1/9/2024  Narrative: XR CHEST 1 VW Date of Exam: 1/9/2024 12:58 PM EST Indication: postop Comparison: 12/29/2023. Findings: 2 right chest tubes are now present with their tips overlying the right upper lobe medially. There is no identifiable pneumothorax. Right transvenous pacemaker is unchanged in position. Left internal jugular port with tip in the mid SVC again noted. There is some mild linear atelectasis in the right lung base. The left lung is clear.    Impression: Impression: Interval placement of 2 right chest tubes. No pneumothorax. Minimal atelectasis noted right lung base. Electronically Signed: Elsa Schmid MD  1/9/2024 1:22 PM EST  Workstation ID: [id number]     Procedures    Bronch 6/2020  Findings:       An EBUS bronchoscope was advanced through the endotracheal tube under        general anesthesia. A comprehensive EBUS survey of all available lymph        node stations revealed lymphadenopathy with benign sonographic features        in stations 11L (9 mm),  4L (8 mm), 7 (15 mm), 4R (11 mm), and 11R (10        mm). EBUS-TBNA x 4 passes (at least) was done at each of those stations        in that sequence with the Olympus ViziShot 21G and 22G needles.       We then withdrew the EBUS scope and inserted a therapeutic scope into        the airway. A thorough airway exam to the first subsegmental level was        unremarkable without endobronchial lesions or secretions. A BAL was        obtained from the right lower lobe superior segment (RB6) (90 ml        instilled, 18 ml of cloudy fluid returned). Adequate hemostasis was        confirmed prior to withdrawing the scope. Patient tolerated procedure.  Impression:           Abnormal CT scan of chest                        1. Successful endobronchial ultrasound bronchoscopy                         with fine needle aspiration.                        2. Mediastinal/hilar lymphadenopathy with benign                         sonographic features in stations 11L, 4L, 7, 4R and 11R.                        3. EBUS-TBNA samples from stations 11L, 4L, 7, 4R and                         11R.                        4. Normal airway anatomy without active bleeding,                         endobronchial lesions or secretions.                        5. BAL from RLL     11R Lymph Node, (EBUS) Fine Needle Aspiration:  - Negative for malignancy  - Abundant lymphoid tissue and histiocytes with anthracotic pigments.     4R Lymph Node, (EBUS) Fine Needle Aspiration:  - Negative for malignancy.  - Abundant lymphoid tissue present.     Subcarinal Lymph Node, (EBUS) Fine Needle Aspiration:  - Negative for malignancy  - Abundant lymphoid tissue present.     4L Lymph Node, (EBUS) Fine Needle Aspiration:  - Negative for malignancy  - Abundant lymphoid tissue.     11L Lymph Node, (EBUS) Fine Needle Aspiration:  - Negative for malignancy.  - Abundant lymphoid tissue and histiocytes with anthracotic pigments.     Bronchoalveolar Lavage, RLL (ThinPrep  only):  NO MALIGNANT CELLS IDENTIFIED  Benign respiratory epithelial cells and pulmonary macrophages       Microbiology Results     Date and Time Order Name Sensitivity Status Organisms Specimen ID Source     2020 1030 Fungus Culture and Smear   Preliminary   [id number] Lung     2020 1030 BAL/Brush Culture plus Stain, Semiquant.   Final   [id number] Lung     2020 1030 Acid Fast Culture Plus Stain   Preliminary   [id number] Lung   TRANSTHORACIC ECHOCARDIOGRAPHY REPORT   Demographics   Patient Name:          RODGER ROB  :            1950   Medical Record Number: [id number]          Age:            73 year(s)   Corporate ID Number:   [id number]          Gender          Male   Account Number:        [id number]   Sonographer:           Faiza TREJO Height:         70 inches   Referring Physician:   JOHN NAVAS  Weight:         194.01 pounds   Interpreting           MEDARDO OBRIEN MD        BMI:            27.84 kg/m^2   Physician:   Date of Service:       2023          Blood Pressure: 168/100 mmHg   Room Number:           OP  Type of Study:   TTE procedure: ECHO COMPLETE (DOPPLER / COLOR) W OR WO CONTRAST.   Patient Status: Routine OP   Study Location: Echo LabTechnical Quality: Adequate visualization   Impression:   Indication: Other forms of chronic ischemic heart disease I25.89   ########################################   Normal sized left ventricle. Moderate left ventricular hypertrophy.   Visually estimated ejection fraction 55% +/- 5%. Abnormal systolic strain   pattern. Normal left ventricular diastolic function.   No significant valvular heart disease.   ########################################  Measurements Summary:   LVEDd: 4.16 cm         LVESd: 3.03 cm          IVSEd: 1.45 cm   AO Root:3.76 cm        LVPWd: 1.38 cm   Contractility Score   At rest the following contractility abnormalities were noted: Hypokinesis   of the Basal infero-lateral, the Basal jalen-septal,  the Basal   infero-septal, the Basal inferior and the Basal jalen-lateral segments.   Contractility of all other segments appeared normal.   LV regional wall motion: (0-Not visualized 1-Normal 2-Hypokinesis   3-Akinesis 4-Dyskinesis 5-Aneurysm)   Left Ventricle   Peak E-wave: 0.48   Peak A-wave: 0.62 m/s   E/A ratio: 0.78   m/s                 Volume [id number]   LV length: 8.71 cm   ml   Volume pgoktthi12.24 ml   LVOT diameter: 2.41 cm   Normal sized left ventricle.   Moderate left ventricular hypertrophy.   Visually estimated ejection fraction 55% +/- 5%.   Abnormal systolic strain pattern.   Normal left ventricular diastolic function.   No left ventricular masses or thrombi.   Right Ventricle   Diastolic dimension: 3.05     RV systolic pressure: 21.97 mmHg   cm   Normal sized right ventricle.   Pacing electrode in the right ventricle.   Normal right ventricular function.   Left Atrium   LA dimension: 3.34 cm               LA volume:52.02 ml   LA/Aorta: 0.89   Normal sized left atrium.   Normal left atrial volume index   Intact atrial septum.   No atrial mass or thrombus.   Right Atrium   Normal sized right atrium.   Pacer wire crosses the tricuspid valve.   Intact atrial septum.   No atrial mass or thrombus.   Mitral Valve   Deceleration time: 197.82 msec   Thickened mitral valve leaflets.   Trace mitral regurgitation.   No mitral stenosis.   No masses or vegetations seen.   Aortic Valve   AI P1/2t: 674.17 msec   LVOT VTI: 18.35 cm         Deceleration time: 2324.74 msec   Mildly thickend free edges of the aortic valve leaflets.   Mild aortic valve regurgitation.   No aortic stenosis.   No masses or vegetations seen.   Tricuspid Valve   TR velocity: 2.18 m/s     TR gradient: 18.67773 mmHg   Estimated RAP: 3 mmHg     RVSP: 21.97 mmHg   Structurally normal tricuspid valve.   Trace tricuspid regurgitation.   No tricuspid stenosis.   No masses or vegetations seen.   Pulmonic Valve   Acceleration time: 89.97 msec            PASP: 21.97 mmHg   Structurally normal pulmonic valve.   Abnormal pulmonary acceleration time.   No pulmonic regurgitation.   No pulmonic stenosis.   No masses or vegetations seen.  Great Vessels  Aorta   Aortic Root: 3.76 cm   Ascending Aorta: 3.71 cm   LVOT Diameter: 2.41 cm  Visualized aorta is normal.  Mildly dilated aortic root 3.8 cm.  No evidence of dissection.  Normal IVC with appropriate collapse.   Pericardium / Pleura  No pericardial effusion.   ----------------------------------------------------------------   Electronically signed by MEDARDO OBRIEN MD(Interpreting Physician)   on 2023 16:31   ----------------------------------------------------------------  Procedure Note    Medardo Obrien MD - 2023  Formatting of this note might be different from the original.  TRANSTHORACIC ECHOCARDIOGRAPHY REPORT   Demographics   Patient Name:          RODGER ROB  :            1950   Medical Record Number: [id number]          Age:            73 year(s)   Corporate ID Number:   [id number]          Gender          Male   Account Number:        [id number]   Sonographer:           Faiza TREJO Height:         70 inches   Referring Physician:   JOHN NAVAS  Weight:         194.01 pounds   Interpreting           MEDARDO OBRIEN MD        BMI:            27.84 kg/m^2   Physician:   Date of Service:       2023          Blood Pressure: 168/100 mmHg   Room Number:           OP  Type of Study:   TTE procedure: ECHO COMPLETE (DOPPLER / COLOR) W OR WO CONTRAST.   Patient Status: Routine OP   Study Location: Echo LabTechnical Quality: Adequate visualization   Impression:   Indication: Other forms of chronic ischemic heart disease I25.89   ########################################   Normal sized left ventricle. Moderate left ventricular hypertrophy.   Visually estimated ejection fraction 55% +/- 5%. Abnormal systolic strain   pattern. Normal left ventricular diastolic function.   No  significant valvular heart disease.   ########################################  Measurements Summary:   LVEDd: 4.16 cm         LVESd: 3.03 cm          IVSEd: 1.45 cm   AO Root:3.76 cm        LVPWd: 1.38 cm   Contractility Score   At rest the following contractility abnormalities were noted: Hypokinesis   of the Basal infero-lateral, the Basal jalen-septal, the Basal   infero-septal, the Basal inferior and the Basal jalen-lateral segments.   Contractility of all other segments appeared normal.   LV regional wall motion: (0-Not visualized 1-Normal 2-Hypokinesis   3-Akinesis 4-Dyskinesis 5-Aneurysm)   Left Ventricle   Peak E-wave: 0.48   Peak A-wave: 0.62 m/s   E/A ratio: 0.78   m/s                 Volume naotdzogs49.99   LV length: 8.71 cm   ml   Volume yhkqiula60.24 ml   LVOT diameter: 2.41 cm   Normal sized left ventricle.   Moderate left ventricular hypertrophy.   Visually estimated ejection fraction 55% +/- 5%.   Abnormal systolic strain pattern.   Normal left ventricular diastolic function.   No left ventricular masses or thrombi.   Right Ventricle   Diastolic dimension: 3.05     RV systolic pressure: 21.97 mmHg   cm   Normal sized right ventricle.   Pacing electrode in the right ventricle.   Normal right ventricular function.   Left Atrium   LA dimension: 3.34 cm               LA volume:52.02 ml   LA/Aorta: 0.89   Normal sized left atrium.   Normal left atrial volume index   Intact atrial septum.   No atrial mass or thrombus.   Right Atrium   Normal sized right atrium.   Pacer wire crosses the tricuspid valve.   Intact atrial septum.   No atrial mass or thrombus.   Mitral Valve   Deceleration time: 197.82 msec   Thickened mitral valve leaflets.   Trace mitral regurgitation.   No mitral stenosis.   No masses or vegetations seen.   Aortic Valve   AI P1/2t: 674.17 msec   LVOT VTI: 18.35 cm         Deceleration time: 2324.74 msec   Mildly thickend free edges of the aortic valve leaflets.   Mild aortic valve  regurgitation.   No aortic stenosis.   No masses or vegetations seen.   Tricuspid Valve   TR velocity: 2.18 m/s     TR gradient: 18.88681 mmHg   Estimated RAP: 3 mmHg     RVSP: 21.97 mmHg   Structurally normal tricuspid valve.   Trace tricuspid regurgitation.   No tricuspid stenosis.   No masses or vegetations seen.   Pulmonic Valve   Acceleration time: 89.97 msec           PASP: 21.97 mmHg   Structurally normal pulmonic valve.   Abnormal pulmonary acceleration time.   No pulmonic regurgitation.   No pulmonic stenosis.   No masses or vegetations seen.  Great Vessels  Aorta   Aortic Root: 3.76 cm   Ascending Aorta: 3.71 cm   LVOT Diameter: 2.41 cm  Visualized aorta is normal.  Mildly dilated aortic root 3.8 cm.  No evidence of dissection.  Normal IVC with appropriate collapse.   Pericardium / Pleura  No pericardial effusion.  Assessment / Plan      Assessment/Plan:     Nonsmall cell lung cancer of the RLL, Stage IIIA  -He has an enlarging RLL nodule with SUV of 17. Despite negative transbronchial biopsy, he was found to have N2 disease with a positive level 7 LN. We discussed options for definitive treatment to include induction chemo immunotherapy followed by surgical resection, chemoradiation followed by surgical resection, or definitive chemoradiation. We discussed differences in schedules and side effects. He has no contraindications to immunotherapy and I recommended nivolumab + chemotherapy induction.  His case was reviewed at multidisciplinary tumor board including thoracic surgery.  He was felt to be a good candidate for neoadjuvant chemo immunotherapy followed by resection assessment.  He completed 3 cycles of  nivolumab, carboplatin, paclitaxel x3 cycles in a neoadjuvant fashion.  He underwent right lower lobectomy and lymph node dissection.  -Final pathology reviewed and shows 1.4 cm of residual disease with extensive treatment effect.  The previously biopsied lymph node was negative on mediastinal  dissection.  -His case was reviewed at multidisciplinary tumor board and consensus was to proceed with adjuvant immunotherapy without any indication for further chemotherapy or adjuvant radiation.  -Because his original bronchoscopy specimen was QNS for Tempus tissue testing, I ordered repeat Tempus on the resection specimen to rule out any EGFR or ALK mutation.  There was no mutation on liquid biopsy.  -I have personally reviewed his pretreatment PET and presurgery PET with the patient and his significant others pointing out all landmarks including the pleura in the fissure.  We reviewed his operative report.  I also  reviewed the final pathology explaining the differences between the final diagnoses from the operative specimen and the synoptic report which includes information from his prior lymph node biopsy.  We reviewed this at tumor board and his YPT staging is T1b N0 despite biopsy-proven lymph node involvement presurgery.  -Plan to proceed with adjuvant immunotherapy next week as previously planned.    3.  Postoperative neuropathic pain  4.  Depression anxiety  -Pain is improving.  -Intolerant of Cymbalta.  Declines alternative antidepressant trial.    5. Access  -Port    Follow Up:   1 week     Neetu Ashley MD  Hematology and Oncology     I have spent a total of 42 min on reviewing test results/preparing to see patient, counseling patient, performing medically appropriate exam, reviewing imaging and pathology, discussion with pharmacy and nursing, and documenting clinical information in the electronic or other health record.

## 2024-02-02 ENCOUNTER — OFFICE VISIT (OUTPATIENT)
Dept: CARDIAC SURGERY | Facility: CLINIC | Age: 74
End: 2024-02-02
Payer: MEDICARE

## 2024-02-02 VITALS
WEIGHT: 189.6 LBS | HEIGHT: 72 IN | HEART RATE: 83 BPM | OXYGEN SATURATION: 98 % | SYSTOLIC BLOOD PRESSURE: 128 MMHG | TEMPERATURE: 97.8 F | DIASTOLIC BLOOD PRESSURE: 74 MMHG | BODY MASS INDEX: 25.68 KG/M2

## 2024-02-02 DIAGNOSIS — Z98.890 S/P THORACOTOMY: ICD-10-CM

## 2024-02-02 DIAGNOSIS — C34.31 MALIGNANT NEOPLASM OF LOWER LOBE OF RIGHT LUNG: ICD-10-CM

## 2024-02-02 DIAGNOSIS — Z90.2 S/P LOBECTOMY OF LUNG: Primary | ICD-10-CM

## 2024-02-02 NOTE — PROGRESS NOTES
"     [facility] Cardiothoracic Surgery Office Follow Up Note    Date of Encounter: 2024     Name: David Barfield  : 1950     Referred By: No ref. provider found  PCP: Shahid Drake MD    Chief Complaint:    Chief Complaint   Patient presents with    Follow-up     Follow up with CXR, Right Thor/ Lower lobectomy 24. Pt states that he his SOB has increased this past week. Pt states that he is very weak and feels like he just can not gain any strength.        Subjective      History of Present Illness:    It was nice to see David Barfield in follow up.  He is a pleasant 73 y.o. male with PMH significant for former smoker (quit date 2024), COPD, hypertension, hyperlipidemia on statin therapy, AV block s/p pacemaker , and enlarging hypermetabolic 2.9 cm right lower lobe mass with EBUS positive level 7 lymph node (N2 disease) representing clinical stage IIIA cancer who underwent neoadjuvant chemotherapy with Dr. Ashley.   He completed 3 cycles  2023 with post-treatment imaging revealing appropriate response to primary tumor with mild residual uptake in the mediastinum.       Patient is s/p [facility], right thoracotomy for right lower lobectomy and mediastinal lymph node dissection by Dr. Patel on 2024.  See op report for full details.   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  Hospital course was uncomplicated and patient was discharged home on POD #6.      Mr. Barfield was seen by ANAMIKA Saab on 2024 for early evaluation as he contacted our office for poorly controlled chest wall pain.  Apparently he woke up from a nap and his right side was \"killing him.\" He was instructed to take his pain pill by our office but significant other requested same day appointment.  After taking his " "medication en route, his uncontrolled pain had subsided by the time he arrived in our office.  Mr. Barfield presents today for his scheduled postop follow up, accompanied by his significant other.  Patient denies pain but describes as \"soreness\".  He reports a poor appetite and anxiousness.  Patient is established with Oncology.     Review of Systems:  Review of Systems   Constitutional: Positive for malaise/fatigue. Negative for chills, diaphoresis, fever, night sweats, weight gain and weight loss. Decreased appetite: very poor appetite.  HENT:  Negative for hoarse voice.    Eyes:  Negative for blurred vision, double vision and visual disturbance.   Cardiovascular:  Positive for chest pain (tenderness in the right hand side under the ribs) and dyspnea on exertion. Negative for claudication, irregular heartbeat, leg swelling, near-syncope, orthopnea, palpitations, paroxysmal nocturnal dyspnea and syncope.   Respiratory:  Positive for cough, shortness of breath and sputum production (very poor sputum production). Negative for hemoptysis and wheezing.    Hematologic/Lymphatic: Negative for adenopathy and bleeding problem. Does not bruise/bleed easily.   Skin:  Negative for color change, nail changes, poor wound healing and rash.   Musculoskeletal:  Positive for muscle cramps (RLS). Negative for back pain and falls.   Gastrointestinal:  Positive for nausea. Negative for abdominal pain, dysphagia and heartburn.   Genitourinary:  Negative for flank pain.   Neurological:  Positive for light-headedness (with great exertion). Negative for brief paralysis, disturbances in coordination, dizziness, focal weakness, headaches, loss of balance, numbness, paresthesias, sensory change, vertigo and weakness.   Psychiatric/Behavioral:  Positive for depression. Negative for suicidal ideas. The patient has insomnia (pt states that  he sleeps in a recliner and 2 times a night he wakes up not being able to breathe) and is nervous/anxious.  " "  Allergic/Immunologic: Positive for environmental allergies. Negative for persistent infections.     I have reviewed the following portions of the patient's history: problem list, current medications, allergies, past surgical history, past medical history, past social history, past family history, and ROS and confirm it's accurate.    Allergies:  Allergies   Allergen Reactions    Cymbalta [Duloxetine Hcl] GI Intolerance    Gabapentin Mental Status Change     Pt states that this medication \"makes him feel foolish in his head\".     Toradol [Ketorolac Tromethamine] GI Intolerance     Projectile vomiting     Latex Other (See Comments)     Latex allergy     Tape Rash       Medications:      Current Outpatient Medications:     albuterol sulfate  (90 Base) MCG/ACT inhaler, Inhale 2 puffs Every 4 (Four) Hours As Needed for Wheezing., Disp: 18 g, Rfl: 11    fluticasone (VERAMYST) 27.5 MCG/SPRAY nasal spray, 2 sprays into the nostril(s) as directed by provider 1 (One) Time As Needed for Rhinitis or Allergies., Disp: 6 mL, Rfl: 2    Fluticasone-Umeclidin-Vilant (Trelegy Ellipta) 100-62.5-25 MCG/ACT inhaler, Inhale 1 puff Daily., Disp: 3 each, Rfl: 3    HYDROcodone-acetaminophen (Norco) 7.5-325 MG per tablet, Take 1 tablet by mouth Every 6 (Six) Hours As Needed for Moderate Pain., Disp: 25 tablet, Rfl: 0    lidocaine-prilocaine (EMLA) 2.5-2.5 % cream, Apply 1 application  topically to the appropriate area as directed As Needed (45-60 minutes prior to port access.  Cover with saran/plastic wrap.)., Disp: 30 g, Rfl: 3    lisinopril (PRINIVIL,ZESTRIL) 2.5 MG tablet, Take 1 tablet by mouth As Needed (elevated blood pressure). Take 1/2 tablet po prn (Patient taking differently: Take 1 tablet by mouth As Needed (elevated blood pressure systolic over 140). Take 1/2 tablet po prn), Disp: 30 tablet, Rfl: 1    omeprazole (priLOSEC) 40 MG capsule, Take 1 capsule by mouth Daily., Disp: 30 capsule, Rfl: 5    ondansetron (ZOFRAN) 8 " MG tablet, Take 1 tablet by mouth 3 (Three) Times a Day As Needed for Nausea or Vomiting., Disp: 30 tablet, Rfl: 2    pravastatin (PRAVACHOL) 80 MG tablet, Take 1 tablet by mouth Every Night., Disp: 30 tablet, Rfl: 11    sildenafil (VIAGRA) 100 MG tablet, Take 0.5 tablets by mouth Daily As Needed for Erectile Dysfunction., Disp: , Rfl:     tamsulosin (FLOMAX) 0.4 MG capsule 24 hr capsule, Take 1 capsule by mouth Daily., Disp: 30 capsule, Rfl: 1    History:   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    Diabetes mellitus Borderline    Emphysema/COPD     Erectile disorder     GERD (gastroesophageal reflux disease)     History of chemotherapy     Hyperlipidemia     Hypertension 2019    Lung nodule     Mumps     Mumps     Pruritus     after bath    Slow to wake up after anesthesia     Wears dentures     upper only    Wears hearing aid in both ears     usually only wears right       Past Surgical History:   Procedure Laterality Date    BONE BIOPSY      broken bone surgery in his face    BRONCHOSCOPY THORACOTOMY Right 1/9/2024    Procedure: THORACOTOMY FOR LOWER LOBECTOMY AND MEDISTINAL LYMPH NODE DISSECTION RIGHT;  Surgeon: Joey Patel MD;  Location:  [facility];  Service: Cardiothoracic;  Laterality: Right;    BRONCHOSCOPY WITH ION ROBOTIC ASSIST N/A 09/15/2023    Procedure: BRONCHOSCOPY NAVIGATION WITH ENDOBRONCHIAL ULTRASOUND AND ION ROBOT;  Surgeon: Octaviano Sampson MD;  Location:  [facility];  Service: Robotics - Pulmonary;  Laterality: N/A;  ion #6 - 0032  - 0015  Cath guide 0061    EBUS balloon removed and intact    CARDIAC ELECTROPHYSIOLOGY PROCEDURE N/A 08/17/2021    Procedure: Pacemaker DC new;  Surgeon: Kayy Box MD;  Location:  [facility]VASIVE [facility]ATION;  Service: Cardiology;  Laterality: N/A;    FACIAL FRACTURE SURGERY      PACEMAKER IMPLANTATION         Social  "History     Socioeconomic History    Marital status: Significant Other    Number of children: 3   Tobacco Use    Smoking status: Former     Packs/day: 1.00     Years: 53.00     Additional pack years: 0.00     Total pack years: 53.00     Types: Cigarettes     Start date: 1/1/1968    Smokeless tobacco: Never    Tobacco comments:     Pt states that he quit smoking on 1/8/24. The day before his sx.    Vaping Use    Vaping Use: Never used   Substance and Sexual Activity    Alcohol use: Never    Drug use: Never    Sexual activity: Defer     Partners: Female     Birth control/protection: None        Family History   Problem Relation Age of Onset    Aneurysm Mother         brain    Dementia Father     Leukemia Sister     Hypertension Paternal Grandfather     Heart disease Paternal Grandmother        Objective     Physical Exam:  Vitals:    02/02/24 0945   BP: 128/74   Pulse: 83   Temp: 97.8 °F (36.6 °C)   SpO2: 98%   Weight: 86 kg (189 lb 9.6 oz)   Height: 182.9 cm (72\")  Comment: per pt      Body mass index is 25.71 kg/m².    Physical Exam  Vitals reviewed.   Constitutional:       Appearance: Normal appearance.   Eyes:      Pupils: Pupils are equal, round, and reactive to light.   Cardiovascular:      Rate and Rhythm: Normal rate and regular rhythm.      Heart sounds: No murmur heard.  Pulmonary:      Effort: Pulmonary effort is normal.      Breath sounds: Examination of the right-lower field reveals decreased breath sounds.   Neurological:      General: No focal deficit present.      Mental Status: He is alert and oriented to person, place, and time.   Psychiatric:         Mood and Affect: Mood normal.         Behavior: Behavior normal.         Thought Content: Thought content normal.         Judgment: Judgment normal.         Imaging/Labs:  XR Chest 2 View  Awaiting final report.   XR Chest 2 View (02/02/2024 10:06)     ..I personally reviewed this imaging.    XR Chest 2 View  Result Date: 1/22/2024  Impression: Stable " "appearance to a right apical pneumothorax. Right lower lobe volume loss and pleural effusion not significantly changed from prior study. Electronically Signed: Sugar Gongora MD  1/22/2024 4:27 PM CST  Workstation ID: [id number]        Assessment / Plan      Assessment / Plan:  PMH significant for former smoker (quit date 1/8/2024), COPD, hypertension, hyperlipidemia on statin therapy, AV block s/p pacemaker 2021, and enlarging hypermetabolic 2.9 cm right lower lobe mass with EBUS positive level 7 lymph node (N2 disease) representing clinical stage IIIA cancer who underwent neoadjuvant chemotherapy with Dr. Ashley.   He completed 3 cycles  11/2023 with post-treatment imaging revealing appropriate response to primary tumor with mild residual uptake in the mediastinum.       Right LLL stage IIIA cancer  S/p bronch, right thoracotomy for RLL and mediastinal lymph node dissection by Dr. Patel on 1/9/2024.  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  Level 4 & 7 lymph noes benign.  Histologic G2 moderately differentiated.  No visceral pleural or lymph-vascular invasion identified ([id number]).   Uncomplicated hospital course.   Deemed appropriate for discharge on POD #6.   Seen early postop for evaluation of pain.   Ultimately, patient had not been taking his pain medication.  \"Uncontrolled\" pain subsided after taking his medication.  Presents today for scheduled postop follow up.   Denies pain but describes as \"soreness\".   Reports a poor appetite and anxiousness.   Encourage increased protein intake.  Patient was placed on Cymbalta but did not tolerate.  Right thoracotomy incision has healed nicely with minimal scabbing and glue remaining.   He is established with Oncology.   Reports medication compliance.     Patient Education:  Continue to keep your incisions clean and dry.  Use plain antibacterial soap (i.e. dial) and water to clean and pat dry.    Do no apply any lotions, " creams, oils, powders, salves, or ointments directly to incisional sites.  Please watch for signs and symptoms of infection which may include but are not limited to: increased pain or tenderness around your incision, warmth at the site or fever, redness or red streaking, and foul smelling or appearing drainage.  If your incision opens up please contact our office.    Instructions and post-operative restrictions verbally reviewed -- patient verbalized understanding.    Follow Up:   We will follow on an as needed basis.    Patient encouraged to call the office with any questions or concerns.     Thank you for allowing me to participate in your care.  Best Regards,    Noreen Can APRPENELOPE  [facility] Cardiothoracic Surgery  02/02/24  09:47 EST

## 2024-02-06 ENCOUNTER — SPECIALTY PHARMACY (OUTPATIENT)
Dept: ONCOLOGY | Facility: HOSPITAL | Age: 74
End: 2024-02-06
Payer: MEDICARE

## 2024-02-06 ENCOUNTER — OFFICE VISIT (OUTPATIENT)
Dept: ONCOLOGY | Facility: CLINIC | Age: 74
End: 2024-02-06
Payer: MEDICARE

## 2024-02-06 ENCOUNTER — DOCUMENTATION (OUTPATIENT)
Dept: NUTRITION | Facility: HOSPITAL | Age: 74
End: 2024-02-06
Payer: MEDICARE

## 2024-02-06 ENCOUNTER — HOSPITAL ENCOUNTER (OUTPATIENT)
Dept: ONCOLOGY | Facility: HOSPITAL | Age: 74
Discharge: HOME | End: 2024-02-06
Payer: MEDICARE

## 2024-02-06 ENCOUNTER — APPOINTMENT (OUTPATIENT)
Dept: ONCOLOGY | Facility: HOSPITAL | Age: 74
End: 2024-02-06
Payer: MEDICARE

## 2024-02-06 ENCOUNTER — HOSPITAL ENCOUNTER (OUTPATIENT)
Dept: ONCOLOGY | Facility: HOSPITAL | Age: 74
Discharge: HOME | End: 2024-02-06
Admitting: INTERNAL MEDICINE
Payer: MEDICARE

## 2024-02-06 VITALS
HEART RATE: 86 BPM | OXYGEN SATURATION: 98 % | HEIGHT: 72 IN | BODY MASS INDEX: 25.63 KG/M2 | DIASTOLIC BLOOD PRESSURE: 69 MMHG | WEIGHT: 189.2 LBS | SYSTOLIC BLOOD PRESSURE: 113 MMHG | TEMPERATURE: 97.3 F

## 2024-02-06 DIAGNOSIS — C34.31 MALIGNANT NEOPLASM OF LOWER LOBE OF RIGHT LUNG: Primary | ICD-10-CM

## 2024-02-06 DIAGNOSIS — R20.0 NUMBNESS AND TINGLING IN RIGHT HAND: ICD-10-CM

## 2024-02-06 DIAGNOSIS — R20.2 NUMBNESS AND TINGLING IN RIGHT HAND: ICD-10-CM

## 2024-02-06 LAB
ALBUMIN SERPL-MCNC: 3.7 G/DL (ref 3.5–5.2)
ALBUMIN/GLOB SERPL: 1 G/DL
ALP SERPL-CCNC: 103 U/L (ref 39–117)
ALT SERPL W P-5'-P-CCNC: 12 U/L (ref 1–41)
ANION GAP SERPL CALCULATED.3IONS-SCNC: 10 MMOL/L (ref 5–15)
AST SERPL-CCNC: 12 U/L (ref 1–40)
BASOPHILS # BLD AUTO: 0.02 10*3/MM3 (ref 0–0.2)
BASOPHILS NFR BLD AUTO: 0.2 % (ref 0–1.5)
BILIRUB SERPL-MCNC: 0.3 MG/DL (ref 0–1.2)
BUN SERPL-MCNC: 7 MG/DL (ref 8–23)
BUN/CREAT SERPL: 7.6 (ref 7–25)
CALCIUM SPEC-SCNC: 9.4 MG/DL (ref 8.6–10.5)
CHLORIDE SERPL-SCNC: 101 MMOL/L (ref 98–107)
CO2 SERPL-SCNC: 25 MMOL/L (ref 22–29)
CREAT SERPL-MCNC: 0.92 MG/DL (ref 0.76–1.27)
DEPRECATED RDW RBC AUTO: 54.7 FL (ref 37–54)
EGFRCR SERPLBLD CKD-EPI 2021: 87.3 ML/MIN/1.73
EOSINOPHIL # BLD AUTO: 0.98 10*3/MM3 (ref 0–0.4)
EOSINOPHIL NFR BLD AUTO: 9.8 % (ref 0.3–6.2)
ERYTHROCYTE [DISTWIDTH] IN BLOOD BY AUTOMATED COUNT: 15 % (ref 12.3–15.4)
GLOBULIN UR ELPH-MCNC: 3.6 GM/DL
GLUCOSE SERPL-MCNC: 111 MG/DL (ref 65–99)
HCT VFR BLD AUTO: 33.3 % (ref 37.5–51)
HGB BLD-MCNC: 11.1 G/DL (ref 13–17.7)
IMM GRANULOCYTES # BLD AUTO: 0.01 10*3/MM3 (ref 0–0.05)
IMM GRANULOCYTES NFR BLD AUTO: 0.1 % (ref 0–0.5)
LYMPHOCYTES # BLD AUTO: 1.27 10*3/MM3 (ref 0.7–3.1)
LYMPHOCYTES NFR BLD AUTO: 12.8 % (ref 19.6–45.3)
MCH RBC QN AUTO: 32.6 PG (ref 26.6–33)
MCHC RBC AUTO-ENTMCNC: 33.3 G/DL (ref 31.5–35.7)
MCV RBC AUTO: 97.9 FL (ref 79–97)
MONOCYTES # BLD AUTO: 0.96 10*3/MM3 (ref 0.1–0.9)
MONOCYTES NFR BLD AUTO: 9.6 % (ref 5–12)
NEUTROPHILS NFR BLD AUTO: 6.72 10*3/MM3 (ref 1.7–7)
NEUTROPHILS NFR BLD AUTO: 67.5 % (ref 42.7–76)
PLATELET # BLD AUTO: 303 10*3/MM3 (ref 140–450)
PMV BLD AUTO: 8.2 FL (ref 6–12)
POTASSIUM SERPL-SCNC: 4.1 MMOL/L (ref 3.5–5.2)
PROT SERPL-MCNC: 7.3 G/DL (ref 6–8.5)
RBC # BLD AUTO: 3.4 10*6/MM3 (ref 4.14–5.8)
SODIUM SERPL-SCNC: 136 MMOL/L (ref 136–145)
T4 FREE SERPL-MCNC: 1.21 NG/DL (ref 0.93–1.7)
TSH SERPL DL<=0.05 MIU/L-ACNC: 1.78 UIU/ML (ref 0.27–4.2)
WBC NRBC COR # BLD AUTO: 9.96 10*3/MM3 (ref 3.4–10.8)

## 2024-02-06 PROCEDURE — 84443 ASSAY THYROID STIM HORMONE: CPT | Performed by: INTERNAL MEDICINE

## 2024-02-06 PROCEDURE — 85025 COMPLETE CBC W/AUTO DIFF WBC: CPT | Performed by: INTERNAL MEDICINE

## 2024-02-06 PROCEDURE — 25010000002 HEPARIN LOCK FLUSH PER 10 UNITS: Performed by: INTERNAL MEDICINE

## 2024-02-06 PROCEDURE — 25810000003 SODIUM CHLORIDE 0.9 % SOLUTION 250 ML FLEX CONT: Performed by: INTERNAL MEDICINE

## 2024-02-06 PROCEDURE — 96413 CHEMO IV INFUSION 1 HR: CPT

## 2024-02-06 PROCEDURE — 84439 ASSAY OF FREE THYROXINE: CPT | Performed by: INTERNAL MEDICINE

## 2024-02-06 PROCEDURE — 80053 COMPREHEN METABOLIC PANEL: CPT | Performed by: INTERNAL MEDICINE

## 2024-02-06 PROCEDURE — 25810000003 SODIUM CHLORIDE 0.9 % SOLUTION: Performed by: INTERNAL MEDICINE

## 2024-02-06 PROCEDURE — 25010000002 ATEZOLIZUMAB 1200 MG/20ML SOLUTION 20 ML VIAL: Performed by: INTERNAL MEDICINE

## 2024-02-06 RX ORDER — HEPARIN SODIUM (PORCINE) LOCK FLUSH IV SOLN 100 UNIT/ML 100 UNIT/ML
500 SOLUTION INTRAVENOUS AS NEEDED
Status: DISCONTINUED | OUTPATIENT
Start: 2024-02-06 | End: 2024-02-07 | Stop reason: HOSPADM

## 2024-02-06 RX ORDER — SODIUM CHLORIDE 9 MG/ML
20 INJECTION, SOLUTION INTRAVENOUS ONCE
Status: COMPLETED | OUTPATIENT
Start: 2024-02-06 | End: 2024-02-06

## 2024-02-06 RX ORDER — HEPARIN SODIUM (PORCINE) LOCK FLUSH IV SOLN 100 UNIT/ML 100 UNIT/ML
500 SOLUTION INTRAVENOUS AS NEEDED
OUTPATIENT
Start: 2024-02-06

## 2024-02-06 RX ORDER — SODIUM CHLORIDE 0.9 % (FLUSH) 0.9 %
10 SYRINGE (ML) INJECTION AS NEEDED
OUTPATIENT
Start: 2024-02-06

## 2024-02-06 RX ORDER — SODIUM CHLORIDE 9 MG/ML
20 INJECTION, SOLUTION INTRAVENOUS ONCE
Status: CANCELLED | OUTPATIENT
Start: 2024-02-06

## 2024-02-06 RX ADMIN — HEPARIN 500 UNITS: 100 SYRINGE at 12:14

## 2024-02-06 RX ADMIN — ATEZOLIZUMAB 1200 MG: 1200 INJECTION, SOLUTION INTRAVENOUS at 10:56

## 2024-02-06 RX ADMIN — SODIUM CHLORIDE 20 ML/HR: 9 INJECTION, SOLUTION INTRAVENOUS at 10:53

## 2024-02-06 NOTE — PROGRESS NOTES
"Onc Nutrition    Patient: David Barfield  YOB: 1950    Diagnosis: Nonsmall cell lung cancer of the RLL, Stage IIIA      Surgery: lobectomy and mediastinal lymph node dissection - 1/9/24    Neoadjuvant Chemotherapy: OP LUNG  Nivolumab /  PACLitaxel / CARBOplatin - 3/3 cycles completed 12/5/23     Adjuvant Treatment: OP LUNG Atezolizumab - every 21 days     Weight - 189# / ~9#(4.5%) weight loss x ~6 weeks     Follow up with patient during his infusion appointment.  Note patient is starting adjuvant treatment today.  Familiar with patient from neoadjuvant treatment.  Patient complains of a decreased appetite and some smell aversions.      Reviewed the importance of good nutrition during his treatment course focusing on adequate calorie, protein, nutrient and fluid intake.  Advised him to be consuming smaller more frequent meals/snacks throughout the day to aid with oral intake.  Emphasized the importance of protein and its role in the diet; reviewed high protein foods; and recommended he have a protein source at each meal/snack.  Offered several high protein snack ideas he may find more appealing at this time.  Encouraged him to be choosing cold / room temperature foods and avoid the kitchen during meal prep / cooking to aid with smell aversions.  Also emphasized the importance of hydration; reviewed good hydrating fluid options; and recommended he decrease his coffee intake and increase his intake of hydrating fluids.  Discussed ONS and their role in the diet.  Provided samples of Boost Plus and Ensure Plus, as well as coupons, and suggested he try [employer] Breakfast Essentials.  Encouraged him to drink ONS as needed to aid with calorie / protein intake.  Offered tip to aid with flavor and nutrient enhancement of ONS.  Provided and reviewed written diet materials \"Soft and Moist High Protein Menu Ideas\" and \"Increasing Fluid Intake\" to reinforce information discussed.     Answered his questions and " he voiced understanding of information discussed.  RD's contact information provided and encouraged to call with questions.  Will monitor as needed during his treatment course.    Anju Denton RD  02/06/24

## 2024-02-06 NOTE — PROGRESS NOTES
[facility]patient [facility]  [address] [place] [address]  [phone]      CHEMOTHERAPY EDUCATION    NAME:  David Barfield      : 1950           DATE: 24    Medication Education Sheets: (select all that apply)  Atezolizumab    Other Education Sheets: (select all that apply)  Diarrhea and Symptom Tracker Sheet and ALICIA Information    Chemotherapy Regimen:   OP LUNG Atezolizumab every 21 days    TOPICS EDUCATION PROVIDED COMMENTS   DIARRHEA:  causes, s/s of dehydration, ways to manage, dietary changes, when to call MD [x] Discussed risk of diarrhea and immune related colitis. Instructed patient to call MD if 4-6 episodes in 24 hours and discussed role of high dose steroids for the treatment of immune related colitis.    NAUSEA & VOMITING:  cause, use of antiemetics, dietary changes, when to call MD [x] Emetic risk: Low  Premeds: None  Scheduled home meds: None  PRN home meds: Ondansetron 8 mg PO TID PRN N/V  Pharmacy home meds previously sent to: [facility] in [facility]    Instructed the patient to take a dose of the PRN medication at the first onset of nausea and if it's not working to call us for additional medications.  Also provided non-drug measures to mitigate nausea.   NERVOUS SYSTEM CHANGES:  causes, s/s, neuropathies, cognitive changes, ways to manage [x] Discussed the less common, but possible risk of immune cells attacking the nerves. 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 Encouraged him to speak with Dr Ashley about this today and notify the clinic if any of this worsens, spreads, or becomes more persistent.     PAIN:  causes, ways to manage [x] Discussed the less common, but possible risk of immune cells attacking the muscles.  The patient was instructed to call clinic for new or worsening muscle weakness or pain.   SKIN & NAIL CHANGES:  cause, s/s, ways to manage [x]  Discussed potential for a rash or itchy skin from immunotherapy, offered nonpharmacologic prevention strategies, and instructed patient to call if a rash develops that worsens or gets larger.   ORGAN TOXICITIES:  cause, s/s, need for diagnostic tests, labs, when to notify MD [x] Discussed potential effects on organ systems, monitoring, diagnostic tests, labs, and when to notify their MD. Discussed the signs/symptoms of the following: cardiotoxicity, central neurotoxicity (confusion, vision changes, stiff neck, seizure), hepatotoxicity, hormone gland changes (most commonly the thyroid), lung changes and nephrotoxicity.   INFUSION RELATED REACTIONS or INJECTION-SITE REACTION:  Cause, s/s, anaphylaxis, monitoring, etc. [x] Premeds: None    Discussed the uncommon, but possible risk of an infusion reaction and symptoms such as: fever, chills, dizziness, itchiness or rash, flushing, trouble breathing, wheezing, sudden back pain, or feeling faint.  Instructed the patient to notify their nurse if they start feeling weird at any point during their infusion.    Reviewed how infusion reactions are managed.   MISCELLANEOUS:  drug interactions, administration, labs, etc. [x] Discussed infusion schedule, lab draws, infusion times, and total expected visit time.   DDIs: No significant DDIs  Lab draws: On or before day 1 of each cycle, no sooner than 3 days early.   INFERTILITY & SEXUALITY:    causes, fertility preservation options, sexuality changes, ways to manage, importance of birth control [x] IV Oncology Therapy: Reviewed safe sex practices and the importance of minimizing exposure to body fluids for 48 hours after each dose of IV oncology therapy. The patient is not sexually active with a woman of childbearing potential.   HOME CARE:  storing of oral chemo, how to manage bodily fluids [x] IV - Counseled on management of soiled linens and proper flush technique.  Discussed how to manage all the side effects at home and  advised when to contact the MD office.     Medications:  Prior to Admission medications    Medication Sig Start Date End Date Taking? Authorizing Provider   albuterol sulfate  (90 Base) MCG/ACT inhaler Inhale 2 puffs Every 4 (Four) Hours As Needed for Wheezing. 8/29/23   Octaviano Sampson MD   fluticasone (VERAMYST) 27.5 MCG/SPRAY nasal spray 2 sprays into the nostril(s) as directed by provider 1 (One) Time As Needed for Rhinitis or Allergies. 10/24/23   Neetu Ashley MD   Fluticasone-Umeclidin-Vilant (Trelegy Ellipta) 100-62.5-25 MCG/ACT inhaler Inhale 1 puff Daily. 8/29/23   Octaviano Sampson MD   HYDROcodone-acetaminophen (Norco) 7.5-325 MG per tablet Take 1 tablet by mouth Every 6 (Six) Hours As Needed for Moderate Pain. 1/12/24   Ruma Orozco APRN   lidocaine-prilocaine (EMLA) 2.5-2.5 % cream Apply 1 application  topically to the appropriate area as directed As Needed (45-60 minutes prior to port access.  Cover with saran/plastic wrap.). 10/17/23   Siena Barcenas APRN   lisinopril (PRINIVIL,ZESTRIL) 2.5 MG tablet Take 1 tablet by mouth As Needed (elevated blood pressure). Take 1/2 tablet po prn  Patient taking differently: Take 1 tablet by mouth As Needed (elevated blood pressure systolic over 140). Take 1/2 tablet po prn 11/18/21   Caitie Marc APRN   omeprazole (priLOSEC) 40 MG capsule Take 1 capsule by mouth Daily. 12/28/23   Neetu Ashley MD   ondansetron (ZOFRAN) 8 MG tablet Take 1 tablet by mouth 3 (Three) Times a Day As Needed for Nausea or Vomiting. 10/17/23   Siena Barcenas APRN   pravastatin (PRAVACHOL) 80 MG tablet Take 1 tablet by mouth Every Night. 11/18/21   Caitie Marc APRN   sildenafil (VIAGRA) 100 MG tablet Take 0.5 tablets by mouth Daily As Needed for Erectile Dysfunction.    Provider, MD Karissa   tamsulosin (FLOMAX) 0.4 MG capsule 24 hr capsule Take 1 capsule by mouth Daily. 1/16/24   Ruma Orozco APRN   DULoxetine (CYMBALTA) 30 MG  capsule Take 1 capsule by mouth Daily. 1/23/24 1/30/24  Neetu Ashley MD     Notes: All questions and concerns were addressed. Provided a personalized treatment calendar to patient (includes treatment and lab schedule). Provided patient with contact information for the pharmacist and clinic while instructing him to call if any questions or concerns arise. Informed consent for treatment was obtained. Patient and his significant other were both receptive to information and expressed understanding.     Ann Cowden Mayer, PharmD, [facility]  Oncology Clinical Pharmacist  Phone [phone]    2/6/2024  07:40 EST

## 2024-02-06 NOTE — PROGRESS NOTES
Hematology and Oncology [place]  Office number [phone]    Fax number [phone]     Follow up     Date: 24    Patient Name: David Barfield  MRN: [id number]  : 1950    Referring Physician: Dr. Sampson    Chief Complaint: Nonsmall cell lung cancer follow-up on treatment    Cancer Staging:  Cancer Staging   Stage IIIA (cT2b, cN2, cM0)    History of Present Illness: David Barfield is a pleasant 73 y.o. male who presents today for evaluation of NSCLC. He has a 50 pack year smoking history.    He has a history of a PET avid subpleural RLL lung nodule [facility] [place] [facility]. This had an SUV of 9.8 on PET  in association with increased mediastinal LN uptake with SUV around 3. Imaging was felt secondary to osteophyte fibrosis. He did undergo bronchoscopy 2020 with negative cytology. The RLL lung nodule was not felt amenable to bronchoscopy biopsy.    CT lung screen 2023 showed:      PET CT showed mass in the RLL intensely SUV avid, max 17. Mediastinal LN not hypermetabolic above baseline.     Right lower lobe robotic transbronchial biopsy and cytology on 9/15/2023 showed benign findings. Cultures including AFT and fungal were negative.  Station 11L, Station 4L LN negative  Station 7 LN showed NSCLCa (positive for cytokeratin 7, p63, and TTF-1 with no significant staining for p40 or Napsin. The staining pattern raises the possibility of mixed adenocarcinoma and squamous cell carcinoma differentiation in this tumor). PDL1 negative.    Tempus negative for targetable mutations on liquid biopsy. QNS on tissue at diagnosis.    MRI brain was negative.    He has a history of sick sinus syndrome s/p PPM and moderate COPD. He describes only mild physical limitations from this. For example he recently walked 1.5 miles at [facility]. At home, he climbs 17 steps without issue. He does sit down with long activities.     Following neoadjuvant chemoimmunotherapy  he underwent lobectomy and mediastinal lymph node dissection on January 9, 2024.  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  Margins were negative.  Regional lymph nodes including 4R, 12 R, and 7 were negative as were 5 intralobar lymph nodes.    Treatment history:  Carbo, taxol, nivo, C1 10/24/23; C2 11/15/23; C3 11/21/23  Atezolizumab maintenance, cycle 1 planned 2/6/2024    Interval history:   Mr. Barfield returns with his supportive significant other for immunotherapy maintenance initiation.  Postoperative pain continues to improve and he is no longer taking pain medication.  Right hand has been numb for years, Right hand is still numb, but now right shoulder pain and right foot is numb. Wonders if sleeping in recliner is causing this. Still low energy. Yesterday was able to mop, take out trash and wash dishes at sink, but then was much more fatigued afterwards.  Orthostasis symptoms have been overall improved, but he did have dizziness on standing yesterday for the first time in several days after heavy activity.  He does endorse low fluid intake as a chronic concern.  Majority of pain is now at drain removal sites. Overall improved.  Restarted smoking having previously quit smoking for 3 weeks postoperatively.     Past Medical History:   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    Diabetes mellitus Borderline    Emphysema/COPD     Erectile disorder     GERD (gastroesophageal reflux disease)     History of chemotherapy     Hyperlipidemia     Hypertension 2019    Lung nodule     Mumps     Mumps     Pruritus     after bath    Slow to wake up after anesthesia     Wears dentures     upper only    Wears hearing aid in both ears     usually only wears right   No personal history  of myocardial infarction, cerebrovascular event, or venous thromboembolism.  No autoimmune diseases.   No prior cscope, mailed cologuard recently    Past Surgical History:   Past Surgical History:   Procedure Laterality Date    BONE BIOPSY      broken bone surgery in his face    BRONCHOSCOPY THORACOTOMY Right 1/9/2024    Procedure: THORACOTOMY FOR LOWER LOBECTOMY AND MEDISTINAL LYMPH NODE DISSECTION RIGHT;  Surgeon: Joey Patel MD;  Location:  [facility];  Service: Cardiothoracic;  Laterality: Right;    BRONCHOSCOPY WITH ION ROBOTIC ASSIST N/A 09/15/2023    Procedure: BRONCHOSCOPY NAVIGATION WITH ENDOBRONCHIAL ULTRASOUND AND ION ROBOT;  Surgeon: Octaviano Sampson MD;  Location:  [facility];  Service: Robotics - Pulmonary;  Laterality: N/A;  ion #6 - [id number]  - 0015  Cath guide 0061    EBUS balloon removed and intact    CARDIAC ELECTROPHYSIOLOGY PROCEDURE N/A 08/17/2021    Procedure: Pacemaker DC new;  Surgeon: Kayy Box MD;  Location:  [facility];  Service: Cardiology;  Laterality: N/A;    FACIAL FRACTURE SURGERY      PACEMAKER IMPLANTATION       Family History:   Family History   Problem Relation Age of Onset    Aneurysm Mother         brain    Dementia Father     Leukemia Sister     Hypertension Paternal Grandfather     Heart disease Paternal Grandmother    Sister leukemia at age 21  No other malignancy    Social History:   Social History     Socioeconomic History    Marital status: Significant Other    Number of children: 3   Tobacco Use    Smoking status: Former     Packs/day: 1.00     Years: 53.00     Additional pack years: 0.00     Total pack years: 53.00     Types: Cigarettes     Start date: 1/1/1968    Smokeless tobacco: Never    Tobacco comments:     Pt states that he quit smoking on 1/8/24. The day before his sx.    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    Medications:     Current Outpatient Medications:     albuterol sulfate  (90 Base) MCG/ACT inhaler, Inhale 2 puffs Every 4 (Four) Hours As Needed for Wheezing., Disp: 18 g, Rfl: 11    fluticasone (VERAMYST) 27.5 MCG/SPRAY nasal spray, 2 sprays into the nostril(s) as directed by provider 1 (One) Time As Needed for Rhinitis or Allergies., Disp: 6 mL, Rfl: 2    Fluticasone-Umeclidin-Vilant (Trelegy Ellipta) 100-62.5-25 MCG/ACT inhaler, Inhale 1 puff Daily., Disp: 3 each, Rfl: 3    HYDROcodone-acetaminophen (Norco) 7.5-325 MG per tablet, Take 1 tablet by mouth Every 6 (Six) Hours As Needed for Moderate Pain., Disp: 25 tablet, Rfl: 0    lidocaine-prilocaine (EMLA) 2.5-2.5 % cream, Apply 1 application  topically to the appropriate area as directed As Needed (45-60 minutes prior to port access.  Cover with saran/plastic wrap.)., Disp: 30 g, Rfl: 3    lisinopril (PRINIVIL,ZESTRIL) 2.5 MG tablet, Take 1 tablet by mouth As Needed (elevated blood pressure). Take 1/2 tablet po prn (Patient taking differently: Take 1 tablet by mouth As Needed (elevated blood pressure systolic over 140). Take 1/2 tablet po prn), Disp: 30 tablet, Rfl: 1    omeprazole (priLOSEC) 40 MG capsule, Take 1 capsule by mouth Daily., Disp: 30 capsule, Rfl: 5    ondansetron (ZOFRAN) 8 MG tablet, Take 1 tablet by mouth 3 (Three) Times a Day As Needed for Nausea or Vomiting., Disp: 30 tablet, Rfl: 2    pravastatin (PRAVACHOL) 80 MG tablet, Take 1 tablet by mouth Every Night., Disp: 30 tablet, Rfl: 11    sildenafil (VIAGRA) 100 MG tablet, Take 0.5 tablets by mouth Daily As Needed for Erectile Dysfunction., Disp: , Rfl:     tamsulosin (FLOMAX) 0.4 MG capsule 24 hr capsule, Take 1 capsule by mouth Daily., Disp: 30 capsule, Rfl: 1    Allergies:   Allergies   Allergen Reactions    Cymbalta [Duloxetine Hcl]  "GI Intolerance    Gabapentin Mental Status Change     Pt states that this medication \"makes him feel foolish in his head\".     Toradol [Ketorolac Tromethamine] GI Intolerance     Projectile vomiting     Latex Other (See Comments)     Latex allergy     Tape Rash       Objective     Vital Signs:   Vitals:    02/06/24 0926   BP: 113/69   Pulse: 86   Temp: 97.3 °F (36.3 °C)   TempSrc: Temporal   SpO2: 98%   Weight: 85.8 kg (189 lb 3.2 oz)   Height: 182.9 cm (72.01\")   PainSc: 0-No pain    Body mass index is 25.65 kg/m².   Pain Score    02/06/24 0926   PainSc: 0-No pain       ECOG Performance Status: 1 - Symptomatic but completely ambulatory    Physical Exam:   General: No acute distress. Well appearing   HEENT: Normocephalic, atraumatic. Sclera anicteric.   Neck: supple, no adenopathy.   Cardiovascular: regular rate and rhythm. No murmurs.   Respiratory: Wheezing, good air movement.  Abdomen: Soft, nontender, non distended with normoactive bowel sounds  Lymph: no cervical, supraclavicular or axillary adenopathy  Neuro: Alert and oriented x 3. No focal deficits.   Ext: Symmetric, no swelling.       Laboratory/Imaging Reviewed:   Hospital Outpatient Visit on 02/06/2024   Component Date Value Ref Range Status    Glucose 02/06/2024 111 (H)  65 - 99 mg/dL Final    BUN 02/06/2024 7 (L)  8 - 23 mg/dL Final    Creatinine 02/06/2024 0.92  0.76 - 1.27 mg/dL Final    Sodium 02/06/2024 136  136 - 145 mmol/L Final    Potassium 02/06/2024 4.1  3.5 - 5.2 mmol/L Final    Chloride 02/06/2024 101  98 - 107 mmol/L Final    CO2 02/06/2024 25.0  22.0 - 29.0 mmol/L Final    Calcium 02/06/2024 9.4  8.6 - 10.5 mg/dL Final    Total Protein 02/06/2024 7.3  6.0 - 8.5 g/dL Final    Albumin 02/06/2024 3.7  3.5 - 5.2 g/dL Final    ALT (SGPT) 02/06/2024 12  1 - 41 U/L Final    AST (SGOT) 02/06/2024 12  1 - 40 U/L Final    Alkaline Phosphatase 02/06/2024 103  39 - 117 U/L Final    Total Bilirubin 02/06/2024 0.3  0.0 - 1.2 mg/dL Final    Globulin " 02/06/2024 3.6  gm/dL Final    Calculated Result    A/G Ratio 02/06/2024 1.0  g/dL Final    BUN/Creatinine Ratio 02/06/2024 7.6  7.0 - 25.0 Final    Anion Gap 02/06/2024 10.0  5.0 - 15.0 mmol/L Final    eGFR 02/06/2024 87.3  >60.0 mL/min/1.73 Final    WBC 02/06/2024 9.96  3.40 - 10.80 10*3/mm3 Final    RBC 02/06/2024 3.40 (L)  4.14 - 5.80 10*6/mm3 Final    Hemoglobin 02/06/2024 11.1 (L)  13.0 - 17.7 g/dL Final    Hematocrit 02/06/2024 33.3 (L)  37.5 - 51.0 % Final    MCV 02/06/2024 97.9 (H)  79.0 - 97.0 fL Final    MCH 02/06/2024 32.6  26.6 - 33.0 pg Final    MCHC 02/06/2024 33.3  31.5 - 35.7 g/dL Final    RDW 02/06/2024 15.0  12.3 - 15.4 % Final    RDW-SD 02/06/2024 54.7 (H)  37.0 - 54.0 fl Final    MPV 02/06/2024 8.2  6.0 - 12.0 fL Final    Platelets 02/06/2024 303  140 - 450 10*3/mm3 Final    Neutrophil % 02/06/2024 67.5  42.7 - 76.0 % Final    Lymphocyte % 02/06/2024 12.8 (L)  19.6 - 45.3 % Final    Monocyte % 02/06/2024 9.6  5.0 - 12.0 % Final    Eosinophil % 02/06/2024 9.8 (H)  0.3 - 6.2 % Final    Basophil % 02/06/2024 0.2  0.0 - 1.5 % Final    Immature Grans % 02/06/2024 0.1  0.0 - 0.5 % Final    Neutrophils, Absolute 02/06/2024 6.72  1.70 - 7.00 10*3/mm3 Final    Lymphocytes, Absolute 02/06/2024 1.27  0.70 - 3.10 10*3/mm3 Final    Monocytes, Absolute 02/06/2024 0.96 (H)  0.10 - 0.90 10*3/mm3 Final    Eosinophils, Absolute 02/06/2024 0.98 (H)  0.00 - 0.40 10*3/mm3 Final    Basophils, Absolute 02/06/2024 0.02  0.00 - 0.20 10*3/mm3 Final    Immature Grans, Absolute 02/06/2024 0.01  0.00 - 0.05 10*3/mm3 Final       XR Chest 2 View    Result Date: 2/3/2024  Narrative: XR CHEST 2 VW Date of Exam: 2/2/2024 9:59 AM EST Indication: RIGHT PLEURAL EFFUSION Comparison: 1/22/2024. Findings: Persistent small chronic right-sided pleural effusion and right basilar scarring. Left lung remains clear. No pneumothorax. There is volume loss in the right lung. Heart size and pulmonary vasculature appear within normal limits.  Stable right-sided ICD and left-sided chest port.     Impression: Impression: Stable small right-sided pleural effusion and right basilar scarring. Electronically Signed: Raulito Crenshaw MD  2/3/2024 5:26 PM EST  Workstation ID: [id number]    XR Chest 2 View    Result Date: 1/22/2024  Narrative: XR CHEST 2 VW Date of Exam: 1/22/2024 1:23 PM CST Indication: S/P RIGHT LOWER LOBECTOMY Comparison: Chest x-ray 1/18/2024 Findings: The heart is stable in size. Cardiac pacemaker is noted. There is a left central line catheter with the distal tip overlying the distal superior vena cava. Small right apical pneumothorax is noted measuring 5 mm. There is right lower lobe volume loss and  pleural effusion. Mild left basilar atelectasis is present.     Impression: Impression: Stable appearance to a right apical pneumothorax. Right lower lobe volume loss and pleural effusion not significantly changed from prior study. Electronically Signed: Sugar Gongora MD  1/22/2024 4:27 PM CST  Workstation ID: [id number]    XR Chest 2 View    Result Date: 1/19/2024  Narrative: XR CHEST 2 VW Date of Exam: 1/18/2024 2:51 PM EST Indication: chest xray Comparison: 1/15/2024 at 1059 hours, 0924 hours, 0442 hours. Findings: The right chest tube has been removed. Residual small hydropneumothorax is noted on the right. The component of pneumothorax measures approximately 5 mm at the apex. Atelectasis is again noted in the mid to lower lung on the right. There is also atelectasis in the left lung base. The cardiac silhouette and mediastinum are stable. The left cardiac pacemaker/AICD is noted with leads intact. The left portacatheter is stable in position. Mild residual air is seen in the soft tissues overlying the right chest related to the prior chest tube.     Impression: Impression: 1.The right chest tube has been removed. A small residual hydropneumothorax is seen on the right. The component of pneumothorax at the apex measures approximately 5 mm.  Recommend follow-up to ensure resolution. 2.Residual atelectasis is noted in the right mid to lower lung and left lung base. Electronically Signed: Gabino Ruiz MD  1/19/2024 11:38 AM EST  Workstation ID: [id number]    XR Chest 1 View    Result Date: 1/15/2024  Narrative: XR CHEST 1 VW Date of Exam: 1/15/2024 10:55 AM EST Indication: f/u pneumothorax Comparison: 1/15/2024 timed 0924 hours. Findings: There is no definite right pneumothorax. Heart and pulmonary vessels appear within normal limits. Right sided pacemaker/AICD again noted. Left subclavian deep line is unchanged in position. There is mild atelectasis in the lung bases, greatest on the right, similar.     Impression: Impression: No definite pneumothorax identified after chest tube removal. Electronically Signed: Elsa Schmid MD  1/15/2024 11:24 AM EST  Workstation ID: [id number]    XR Chest 1 View    Result Date: 1/15/2024  Narrative: XR CHEST 1 VW Date of Exam: 1/15/2024 9:26 AM EST Indication: post chest tube removal Comparison: 1/15/2024 Findings: Right thoracotomy tubes have been removed. There is a faint suggestion of a pleural line in the right lateral costophrenic angle that may represent a small pneumothorax. There is probably a 1-2 mm right apical pneumothorax. Mild bibasilar discoid atelectasis appears stable. No new pulmonary parenchymal disease is seen. Heart is upper normal size. Vasculature appears normal.     Impression: Impression: Small right apical and basilar pneumothorax following chest tube removal. Stable mild bibasilar discoid atelectasis. Electronically Signed: Baljeet Carlos MD  1/15/2024 9:38 AM EST  Workstation ID: [id number]    XR Chest 1 View    Result Date: 1/15/2024  Narrative: XR CHEST 1 VW Date of Exam: 1/15/2024 3:43 AM EST Indication: postop right thoracotomy Comparison: 1/14/2024 Findings: Right-sided dual-lead pacemaker, right thoracotomy tubes, and left-sided Port-A-Cath are again noted. Mild hazy interstitial changes of  the right lung and mild left basilar discoid atelectasis appear stable. No new pulmonary parenchymal disease, evidence  of edema or pneumothorax is seen. Trace subcutaneous emphysema on the right is stable.     Impression: Impression: No new chest disease. Electronically Signed: Baljeet Carlos MD  1/15/2024 7:30 AM EST  Workstation ID: [id number]    XR Chest 1 View    Result Date: 1/14/2024  Narrative: XR CHEST 1 VW Date of Exam: 1/14/2024 5:24 AM EST Indication: postop right thoracotomy Comparison: 1/13/2024 Findings: Right-sided dual-lead pacemaker, left-sided Port-A-Cath, and right thoracotomy tubes are again noted. Minimal apical and basilar pneumothorax is again seen and appears stable. There is minimal remaining bibasilar atelectasis. No new pulmonary parenchymal  disease is seen. Heart and vasculature appear normal in size.     Impression: Impression: 1. Trace remaining right pneumothorax. 2. Minimal remaining bibasilar atelectasis; no new chest disease is seen Electronically Signed: Baljeet Carlos MD  1/14/2024 8:54 AM EST  Workstation ID: [id number]    XR Chest 1 View    Result Date: 1/13/2024  Narrative: XR CHEST 1 VW Date of Exam: 1/13/2024 1:47 AM EST Indication: postop right thoracotomy Comparison: 1/12/2024 at 0842 hours Findings: The right chest tubes are stable in position. Small residual pneumothorax is noted. There is atelectasis within the right lower lung. Atelectasis is also noted in the left lung base. There may be a small left pleural effusion. These findings are stable. The cardiac silhouette and mediastinum are stable. A left portacatheter is stable in position. A right cardiac pacemaker/AICD is noted with leads intact..     Impression: Impression: 1.Stable positioning of the right chest tubes. Small residual pneumothorax is seen on the right. 2.Residual atelectasis is noted in the right lower lung. There is also atelectasis within the left lung base. A small left pleural effusion is seen. These  findings are stable. Electronically Signed: Gabino Ruiz MD  1/13/2024 8:28 AM EST  Workstation ID: [id number]    XR Chest 1 View    Result Date: 1/12/2024  Narrative: XR CHEST 1 VW Date of Exam: 1/12/2024 7:38 AM CST Indication: dyspnea Comparison: 1/12/2024 at 3:18 a.m. Findings: Cardiomediastinal silhouette and support lines and tubes are unchanged. Stable right lung volume loss with basilar airspace disease and potential small subpulmonic pneumothorax. Left basilar airspace disease is unchanged. No acute osseous abnormality identified.     Impression: Impression: 1.Probable small right subpulmonic pneumothorax. No significant change otherwise.. Electronically Signed: Bertrand Gongora MD  1/12/2024 8:01 AM CST  Workstation ID: [id number]    XR Chest 1 View    Result Date: 1/12/2024  Narrative: XR CHEST 1 VW Date of Exam: 1/12/2024 3:03 AM EST Indication: postop right thoracotomy Comparison: 1/11/2024 Findings: 2 right-sided chest tubes are in stable position. No discrete pneumothorax. Volume loss with left right shift of the heart and mediastinum. Left-sided port catheter stable. AICD noted. Mild bibasilar atelectasis. No significant effusion.     Impression: Impression: 1. Stable right-sided chest tubes. No discrete pneumothorax. 2. Stable volume loss with left to right shift of the heart and mediastinum. 3. Mild bibasilar atelectasis. Electronically Signed: Aldo Handley MD  1/12/2024 7:40 AM EST  Workstation ID: [id number]    XR Chest 1 View    Result Date: 1/11/2024  Narrative: XR CHEST 1 VW Date of Exam: 1/11/2024 8:12 AM EST Indication: Postop Comparison: 1/10/2024 Findings: Left approach central venous catheter with distal tip in unchanged position. Right chest wall cardiac device with distal lead tips unchanged. 2 right approach thoracostomy tubes with distal tips in stable position. Unchanged cardiomediastinal silhouette. Unchanged right basilar airspace opacities. No new focal airspace consolidation. No new  pleural effusion. No visible pneumothorax. Osseous structures are unchanged.     Impression: Impression: No significant interval change. Electronically Signed: Raulito Light MD  1/11/2024 8:44 AM EST  Workstation ID: [id number]    XR Chest 1 View    Result Date: 1/10/2024  Narrative: XR CHEST 1 VW Date of Exam: 1/10/2024 1:27 AM EST Indication: postop Comparison: 1/9/2024 Findings: Postsurgical changes of the right hemithorax with 2 right-sided chest tubes in place. No discrete pneumothorax. Minimal bibasilar atelectasis. Heart size normal. No significant effusion. AICD noted.     Impression: Impression: Postsurgical changes of the right hemithorax with 2 right-sided chest tubes in place. No pneumothorax. Electronically Signed: Aldo Handley MD  1/10/2024 7:54 AM EST  Workstation ID: [id number]    XR Chest 1 View    Result Date: 1/9/2024  Narrative: XR CHEST 1 VW Date of Exam: 1/9/2024 12:58 PM EST Indication: postop Comparison: 12/29/2023. Findings: 2 right chest tubes are now present with their tips overlying the right upper lobe medially. There is no identifiable pneumothorax. Right transvenous pacemaker is unchanged in position. Left internal jugular port with tip in the mid SVC again noted. There is some mild linear atelectasis in the right lung base. The left lung is clear.    Impression: Impression: Interval placement of 2 right chest tubes. No pneumothorax. Minimal atelectasis noted right lung base. Electronically Signed: Elsa Schmid MD  1/9/2024 1:22 PM EST  Workstation ID: [id number]     Procedures    Bronch 6/2020  Findings:       An EBUS bronchoscope was advanced through the endotracheal tube under        general anesthesia. A comprehensive EBUS survey of all available lymph        node stations revealed lymphadenopathy with benign sonographic features        in stations 11L (9 mm), 4L (8 mm), 7 (15 mm), 4R (11 mm), and 11R (10        mm). EBUS-TBNA x 4 passes (at least) was done at each of those stations         in that sequence with the Olympus ViziShot 21G and 22G needles.       We then withdrew the EBUS scope and inserted a therapeutic scope into        the airway. A thorough airway exam to the first subsegmental level was        unremarkable without endobronchial lesions or secretions. A BAL was        obtained from the right lower lobe superior segment (RB6) (90 ml        instilled, 18 ml of cloudy fluid returned). Adequate hemostasis was        confirmed prior to withdrawing the scope. Patient tolerated procedure.  Impression:           Abnormal CT scan of chest                        1. Successful endobronchial ultrasound bronchoscopy                         with fine needle aspiration.                        2. Mediastinal/hilar lymphadenopathy with benign                         sonographic features in stations 11L, 4L, 7, 4R and 11R.                        3. EBUS-TBNA samples from stations 11L, 4L, 7, 4R and                         11R.                        4. Normal airway anatomy without active bleeding,                         endobronchial lesions or secretions.                        5. BAL from RLL     11R Lymph Node, (EBUS) Fine Needle Aspiration:  - Negative for malignancy  - Abundant lymphoid tissue and histiocytes with anthracotic pigments.     4R Lymph Node, (EBUS) Fine Needle Aspiration:  - Negative for malignancy.  - Abundant lymphoid tissue present.     Subcarinal Lymph Node, (EBUS) Fine Needle Aspiration:  - Negative for malignancy  - Abundant lymphoid tissue present.     4L Lymph Node, (EBUS) Fine Needle Aspiration:  - Negative for malignancy  - Abundant lymphoid tissue.     11L Lymph Node, (EBUS) Fine Needle Aspiration:  - Negative for malignancy.  - Abundant lymphoid tissue and histiocytes with anthracotic pigments.     Bronchoalveolar Lavage, RLL (ThinPrep only):  NO MALIGNANT CELLS IDENTIFIED  Benign respiratory epithelial cells and pulmonary macrophages       Microbiology Results      Date and Time Order Name Sensitivity Status Organisms Specimen ID Source     2020 1030 Fungus Culture and Smear   Preliminary   [id number] Lung     2020 1030 BAL/Brush Culture plus Stain, Semiquant.   Final   [id number] Lung     2020 1030 Acid Fast Culture Plus Stain   Preliminary   [id number] Lung   TRANSTHORACIC ECHOCARDIOGRAPHY REPORT   Demographics   Patient Name:          RODGER ROB  :            1950   Medical Record Number: [id number]          Age:            73 year(s)   Corporate ID Number:   [id number]          Gender          Male   Account Number:        [id number]   Sonographer:           Faiza TREJO Height:         70 inches   Referring Physician:   JOHN NAVAS  Weight:         194.01 pounds   Interpreting           MEDARDO OBRIEN MD        BMI:            27.84 kg/m^2   Physician:   Date of Service:       2023          Blood Pressure: 168/100 mmHg   Room Number:           OP  Type of Study:   TTE procedure: ECHO COMPLETE (DOPPLER / COLOR) W OR WO CONTRAST.   Patient Status: Routine OP   Study Location: Echo LabTechnical Quality: Adequate visualization   Impression:   Indication: Other forms of chronic ischemic heart disease I25.89   ########################################   Normal sized left ventricle. Moderate left ventricular hypertrophy.   Visually estimated ejection fraction 55% +/- 5%. Abnormal systolic strain   pattern. Normal left ventricular diastolic function.   No significant valvular heart disease.   ########################################  Measurements Summary:   LVEDd: 4.16 cm         LVESd: 3.03 cm          IVSEd: 1.45 cm   AO Root:3.76 cm        LVPWd: 1.38 cm   Contractility Score   At rest the following contractility abnormalities were noted: Hypokinesis   of the Basal infero-lateral, the Basal jalen-septal, the Basal   infero-septal, the Basal inferior and the Basal jalen-lateral segments.   Contractility of all other segments appeared  normal.   LV regional wall motion: (0-Not visualized 1-Normal 2-Hypokinesis   3-Akinesis 4-Dyskinesis 5-Aneurysm)   Left Ventricle   Peak E-wave: 0.48   Peak A-wave: 0.62 m/s   E/A ratio: 0.78   m/s                 Volume [id number]   LV length: 8.71 cm   ml   Volume gjnjhyhm16.24 ml   LVOT diameter: 2.41 cm   Normal sized left ventricle.   Moderate left ventricular hypertrophy.   Visually estimated ejection fraction 55% +/- 5%.   Abnormal systolic strain pattern.   Normal left ventricular diastolic function.   No left ventricular masses or thrombi.   Right Ventricle   Diastolic dimension: 3.05     RV systolic pressure: 21.97 mmHg   cm   Normal sized right ventricle.   Pacing electrode in the right ventricle.   Normal right ventricular function.   Left Atrium   LA dimension: 3.34 cm               LA volume:52.02 ml   LA/Aorta: 0.89   Normal sized left atrium.   Normal left atrial volume index   Intact atrial septum.   No atrial mass or thrombus.   Right Atrium   Normal sized right atrium.   Pacer wire crosses the tricuspid valve.   Intact atrial septum.   No atrial mass or thrombus.   Mitral Valve   Deceleration time: 197.82 msec   Thickened mitral valve leaflets.   Trace mitral regurgitation.   No mitral stenosis.   No masses or vegetations seen.   Aortic Valve   AI P1/2t: 674.17 msec   LVOT VTI: 18.35 cm         Deceleration time: 2324.74 msec   Mildly thickend free edges of the aortic valve leaflets.   Mild aortic valve regurgitation.   No aortic stenosis.   No masses or vegetations seen.   Tricuspid Valve   TR velocity: 2.18 m/s     TR gradient: 18.76790 mmHg   Estimated RAP: 3 mmHg     RVSP: 21.97 mmHg   Structurally normal tricuspid valve.   Trace tricuspid regurgitation.   No tricuspid stenosis.   No masses or vegetations seen.   Pulmonic Valve   Acceleration time: 89.97 msec           PASP: 21.97 mmHg   Structurally normal pulmonic valve.   Abnormal pulmonary acceleration time.   No pulmonic  regurgitation.   No pulmonic stenosis.   No masses or vegetations seen.  Great Vessels  Aorta   Aortic Root: 3.76 cm   Ascending Aorta: 3.71 cm   LVOT Diameter: 2.41 cm  Visualized aorta is normal.  Mildly dilated aortic root 3.8 cm.  No evidence of dissection.  Normal IVC with appropriate collapse.   Pericardium / Pleura  No pericardial effusion.   ----------------------------------------------------------------   Electronically signed by MEDARDO OBRIEN MD(Interpreting Physician)   on 2023 16:31   ----------------------------------------------------------------  Procedure Note    Medardo Obrien MD - 2023  Formatting of this note might be different from the original.  TRANSTHORACIC ECHOCARDIOGRAPHY REPORT   Demographics   Patient Name:          RODGER ROB  :            1950   Medical Record Number: [id number]          Age:            73 year(s)   Corporate ID Number:   [id number]          Gender          Male   Account Number:        [id number]   Sonographer:           aFiza TREJO Height:         70 inches   Referring Physician:   JOHN NAVAS  Weight:         194.01 pounds   Interpreting           MEDARDO OBRIEN MD        BMI:            27.84 kg/m^2   Physician:   Date of Service:       2023          Blood Pressure: 168/100 mmHg   Room Number:           OP  Type of Study:   TTE procedure: ECHO COMPLETE (DOPPLER / COLOR) W OR WO CONTRAST.   Patient Status: Routine OP   Study Location: Echo LabTechnical Quality: Adequate visualization   Impression:   Indication: Other forms of chronic ischemic heart disease I25.89   ########################################   Normal sized left ventricle. Moderate left ventricular hypertrophy.   Visually estimated ejection fraction 55% +/- 5%. Abnormal systolic strain   pattern. Normal left ventricular diastolic function.   No significant valvular heart disease.   ########################################  Measurements Summary:   LVEDd: 4.16 cm          LVESd: 3.03 cm          IVSEd: 1.45 cm   AO Root:3.76 cm        LVPWd: 1.38 cm   Contractility Score   At rest the following contractility abnormalities were noted: Hypokinesis   of the Basal infero-lateral, the Basal jalen-septal, the Basal   infero-septal, the Basal inferior and the Basal jalen-lateral segments.   Contractility of all other segments appeared normal.   LV regional wall motion: (0-Not visualized 1-Normal 2-Hypokinesis   3-Akinesis 4-Dyskinesis 5-Aneurysm)   Left Ventricle   Peak E-wave: 0.48   Peak A-wave: 0.62 m/s   E/A ratio: 0.78   m/s                 Volume [id number]   LV length: 8.71 cm   ml   Volume pgrchfrw60.24 ml   LVOT diameter: 2.41 cm   Normal sized left ventricle.   Moderate left ventricular hypertrophy.   Visually estimated ejection fraction 55% +/- 5%.   Abnormal systolic strain pattern.   Normal left ventricular diastolic function.   No left ventricular masses or thrombi.   Right Ventricle   Diastolic dimension: 3.05     RV systolic pressure: 21.97 mmHg   cm   Normal sized right ventricle.   Pacing electrode in the right ventricle.   Normal right ventricular function.   Left Atrium   LA dimension: 3.34 cm               LA volume:52.02 ml   LA/Aorta: 0.89   Normal sized left atrium.   Normal left atrial volume index   Intact atrial septum.   No atrial mass or thrombus.   Right Atrium   Normal sized right atrium.   Pacer wire crosses the tricuspid valve.   Intact atrial septum.   No atrial mass or thrombus.   Mitral Valve   Deceleration time: 197.82 msec   Thickened mitral valve leaflets.   Trace mitral regurgitation.   No mitral stenosis.   No masses or vegetations seen.   Aortic Valve   AI P1/2t: 674.17 msec   LVOT VTI: 18.35 cm         Deceleration time: 2324.74 msec   Mildly thickend free edges of the aortic valve leaflets.   Mild aortic valve regurgitation.   No aortic stenosis.   No masses or vegetations seen.   Tricuspid Valve   TR velocity: 2.18 m/s     TR gradient:  18.84153 mmHg   Estimated RAP: 3 mmHg     RVSP: 21.97 mmHg   Structurally normal tricuspid valve.   Trace tricuspid regurgitation.   No tricuspid stenosis.   No masses or vegetations seen.   Pulmonic Valve   Acceleration time: 89.97 msec           PASP: 21.97 mmHg   Structurally normal pulmonic valve.   Abnormal pulmonary acceleration time.   No pulmonic regurgitation.   No pulmonic stenosis.   No masses or vegetations seen.  Great Vessels  Aorta   Aortic Root: 3.76 cm   Ascending Aorta: 3.71 cm   LVOT Diameter: 2.41 cm  Visualized aorta is normal.  Mildly dilated aortic root 3.8 cm.  No evidence of dissection.  Normal IVC with appropriate collapse.   Pericardium / Pleura  No pericardial effusion.  Assessment / Plan      Assessment/Plan:     Nonsmall cell lung cancer of the RLL, Stage IIIA  -He has an enlarging RLL nodule with SUV of 17. Despite negative transbronchial biopsy, he was found to have N2 disease with a positive level 7 LN. We discussed options for definitive treatment to include induction chemo immunotherapy followed by surgical resection, chemoradiation followed by surgical resection, or definitive chemoradiation. We discussed differences in schedules and side effects. He has no contraindications to immunotherapy and I recommended nivolumab + chemotherapy induction.  His case was reviewed at multidisciplinary tumor board including thoracic surgery.  He was felt to be a good candidate for neoadjuvant chemo immunotherapy followed by resection assessment.  He completed 3 cycles of  nivolumab, carboplatin, paclitaxel x3 cycles in a neoadjuvant fashion.  He underwent right lower lobectomy and lymph node dissection.  -Final pathology reviewed and shows 1.4 cm of residual disease with extensive treatment effect.  The previously biopsied lymph node was negative on mediastinal dissection.  -His case was reviewed at multidisciplinary tumor board and consensus was to proceed with adjuvant immunotherapy without  any indication for further chemotherapy or adjuvant radiation. Will proceed with Atezolizumab per the [id number] regimen.  -Because his original bronchoscopy specimen was QNS for Tempus tissue testing, I ordered repeat Tempus on the resection specimen to rule out any EGFR or ALK mutation.  There was no mutation on liquid biopsy.  -Labs including     3.  Postoperative neuropathic pain  4.  Depression anxiety  -Pain is improving.  -Intolerant of Cymbalta.  Declines alternative antidepressant trial.    5. Access  -Port    Follow Up:   With repeat labs for next cycle  Is next CT is pending in late February     Neetu Ashley MD  Hematology and Oncology

## 2024-02-08 ENCOUNTER — OFFICE VISIT (OUTPATIENT)
Dept: PULMONOLOGY | Facility: CLINIC | Age: 74
End: 2024-02-08
Payer: MEDICARE

## 2024-02-08 VITALS
WEIGHT: 189 LBS | TEMPERATURE: 97.5 F | DIASTOLIC BLOOD PRESSURE: 70 MMHG | BODY MASS INDEX: 25.6 KG/M2 | OXYGEN SATURATION: 99 % | HEART RATE: 80 BPM | SYSTOLIC BLOOD PRESSURE: 140 MMHG | HEIGHT: 72 IN

## 2024-02-08 DIAGNOSIS — Z87.891 HISTORY OF TOBACCO USE, PRESENTING HAZARDS TO HEALTH: ICD-10-CM

## 2024-02-08 DIAGNOSIS — R59.0 MEDIASTINAL ADENOPATHY: ICD-10-CM

## 2024-02-08 DIAGNOSIS — C34.31 MALIGNANT NEOPLASM OF LOWER LOBE OF RIGHT LUNG: Primary | ICD-10-CM

## 2024-02-08 DIAGNOSIS — J43.2 CENTRILOBULAR EMPHYSEMA: ICD-10-CM

## 2024-02-08 NOTE — PROGRESS NOTES
Pulmonary Consult Note    Subjective   Reason for consultation: Lung nodule    David Barfield is a 73 y.o. male is being seen for consultation today at the request of No ref. provider found    History of Present Illness    73 y.o. male active smoker of 1/2 PPD up to 2 PPD for 50 years with a history of HTN, HL, here for evaluation of a right sided lung nodule.  Had bronchoscopy/EBUS in 2020 at the [facility] that was reportedly benign.  He worked in construction up to age 71.  Had PPM in 2021 by Dr. Box and is followed by Dr. Pop.      Patient has an appointment at the [facility] on September 13th for a procedure but patient/family do not want it done there.      Patient denies dyspnea, hemoptysis, unexplained weight loss, or palpable adenopathy.  He had an episode a few months ago during which time he had worsening dyspnea.  He does not take current inhalers.      Interval history:    I performed a bronchoscopy with EBUS on 9/15/2023.  Results from biopsies showed a nondiagnostic sample from the right lower lobe however the station 7 lymph node was positive for non-small cell carcinoma.  Patient has undergone a right lower lobe lobectomy by Dr. Patel on 1/9/2024.  He received neoadjuvant chemotherapy/immunotherapy under the direction of Dr. Ashley.  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  No evidence of malignancy in the lymph nodes.  He is doing quite well postoperatively.  He is following with Dr. Ashley with plans for immunotherapy going forward.    The following portions of the patient's history were reviewed and updated as appropriate: allergies, current medications, past family history, past medical history, past social history, past surgical history, and problem list.    Active Ambulatory Problems     Diagnosis Date Noted    High degree atrioventricular block 06/30/2021    Bradycardia, sinus 06/30/2021    Chronic hypotension 06/30/2021    PVC's (premature ventricular  contractions) 06/30/2021    Presence of cardiac pacemaker 07/05/2023    Mixed hyperlipidemia 07/05/2023    Centrilobular emphysema 08/29/2023    Nodule of lower lobe of right lung 08/29/2023    Mediastinal adenopathy 08/29/2023    Cough 08/29/2023    History of tobacco use, presenting hazards to health 08/29/2023    Bronchogenic cancer of right lung 10/04/2023    Malignant neoplasm of lower lobe of right lung 10/04/2023    Primary hypertension 01/11/2024    GERD without esophagitis 01/11/2024     Resolved Ambulatory Problems     Diagnosis Date Noted    Lung cancer 01/09/2024     Past Medical History:   Diagnosis Date    Abnormal ECG     Arrhythmia 2019    Asthma 2019    Diabetes mellitus Borderline    Emphysema/COPD     Erectile disorder     GERD (gastroesophageal reflux disease)     History of chemotherapy     Hyperlipidemia     Hypertension 2019    Lung nodule     Mumps     Mumps     Pruritus     Slow to wake up after anesthesia     Wears dentures     Wears hearing aid in both ears        Past Surgical History:   Procedure Laterality Date    BONE BIOPSY      broken bone surgery in his face    BRONCHOSCOPY THORACOTOMY Right 1/9/2024    Procedure: THORACOTOMY FOR LOWER LOBECTOMY AND MEDISTINAL LYMPH NODE DISSECTION RIGHT;  Surgeon: Joey Patel MD;  Location:  [facility];  Service: Cardiothoracic;  Laterality: Right;    BRONCHOSCOPY WITH ION ROBOTIC ASSIST N/A 09/15/2023    Procedure: BRONCHOSCOPY NAVIGATION WITH ENDOBRONCHIAL ULTRASOUND AND ION ROBOT;  Surgeon: Octaviano Sampson MD;  Location:  [facility];  Service: Robotics - Pulmonary;  Laterality: N/A;  ion #6 - 0032  - 0015  Cath guide 0061    EBUS balloon removed and intact    CARDIAC ELECTROPHYSIOLOGY PROCEDURE N/A 08/17/2021    Procedure: Pacemaker DC new;  Surgeon: Kayy Box MD;  Location:  [facility]VASIVE LOCATION;  Service: Cardiology;  Laterality: N/A;    FACIAL FRACTURE SURGERY      PACEMAKER IMPLANTATION         Family  "History   Problem Relation Age of Onset    Aneurysm Mother         brain    Dementia Father     Leukemia Sister     Hypertension Paternal Grandfather     Heart disease Paternal Grandmother        Social History     Socioeconomic History    Marital status: Significant Other    Number of children: 3   Tobacco Use    Smoking status: Former     Packs/day: 1.00     Years: 53.00     Additional pack years: 0.00     Total pack years: 53.00     Types: Cigarettes     Start date: 1/1/1968    Smokeless tobacco: Never    Tobacco comments:     Pt states that he quit smoking on 1/8/24. The day before his sx.    Vaping Use    Vaping Use: Never used   Substance and Sexual Activity    Alcohol use: Never    Drug use: Never    Sexual activity: Defer     Partners: Female     Birth control/protection: None       Allergies   Allergen Reactions    Cymbalta [Duloxetine Hcl] GI Intolerance    Gabapentin Mental Status Change     Pt states that this medication \"makes him feel foolish in his head\".     Toradol [Ketorolac Tromethamine] GI Intolerance     Projectile vomiting     Latex Other (See Comments)     Latex allergy     Tape Rash       Current Outpatient Medications   Medication Sig Dispense Refill    albuterol sulfate  (90 Base) MCG/ACT inhaler Inhale 2 puffs Every 4 (Four) Hours As Needed for Wheezing. 18 g 11    fluticasone (VERAMYST) 27.5 MCG/SPRAY nasal spray 2 sprays into the nostril(s) as directed by provider 1 (One) Time As Needed for Rhinitis or Allergies. 6 mL 2    Fluticasone-Umeclidin-Vilant (Trelegy Ellipta) 100-62.5-25 MCG/ACT inhaler Inhale 1 puff Daily. 3 each 3    HYDROcodone-acetaminophen (Norco) 7.5-325 MG per tablet Take 1 tablet by mouth Every 6 (Six) Hours As Needed for Moderate Pain. 25 tablet 0    lidocaine-prilocaine (EMLA) 2.5-2.5 % cream Apply 1 application  topically to the appropriate area as directed As Needed (45-60 minutes prior to port access.  Cover with saran/plastic wrap.). 30 g 3    lisinopril " "(PRINIVIL,ZESTRIL) 2.5 MG tablet Take 1 tablet by mouth As Needed (elevated blood pressure). Take 1/2 tablet po prn (Patient taking differently: Take 1 tablet by mouth As Needed (elevated blood pressure systolic over 140). Take 1/2 tablet po prn) 30 tablet 1    omeprazole (priLOSEC) 40 MG capsule Take 1 capsule by mouth Daily. 30 capsule 5    ondansetron (ZOFRAN) 8 MG tablet Take 1 tablet by mouth 3 (Three) Times a Day As Needed for Nausea or Vomiting. 30 tablet 2    pravastatin (PRAVACHOL) 80 MG tablet Take 1 tablet by mouth Every Night. 30 tablet 11    sildenafil (VIAGRA) 100 MG tablet Take 0.5 tablets by mouth Daily As Needed for Erectile Dysfunction.      tamsulosin (FLOMAX) 0.4 MG capsule 24 hr capsule Take 1 capsule by mouth Daily. 30 capsule 1     No current facility-administered medications for this visit.       Review of Systems  All other systems were reviewed and are negative.  Exceptions are included in the HPI or as otherwise noted above.     Objective   Blood pressure 140/70, pulse 80, temperature 97.5 °F (36.4 °C), temperature source Infrared, height 182.9 cm (72.01\"), weight 85.7 kg (189 lb), SpO2 99%.  Physical Exam    Physical Exam:     Constitutional:    Alert, cooperative, in no acute distress   Head:    Normocephalic, without obvious abnormality, atraumatic   Eyes:            Lids and lashes normal, conjunctivae and sclerae normal, no   icterus, no pallor, corneas clear, PER   ENMT:   Ears appear intact with no abnormalities noted        Neck:   No adenopathy, supple, trachea midline, no thyromegaly, no JVD       Lungs/Resp:     Normal effort, symmetric chest rise, no crepitus, diminished breath sounds right lower lung field but otherwise clear, no chest wall tenderness                 Heart/CV:    Regular rhythm and normal rate, normal S1 and S2, no            murmur   Abdomen/GI:     Soft, non-tender, non-distended, normal bowel sounds   :     Deferred   Extremities/MSK:   No clubbing or " cyanosis.  No edema.  Normal tone.  No deformities.    Pulses:   Pulses palpable and equal bilaterally   Skin:   No bleeding, bruising or rash   Heme/Lymph:   No cervical or supraclavicular adenopathy.    Neurologic:    Psychiatric:       Moves all extremities with no obvious focal motor deficit.  Cranial nerves 2 - 12 grossly intact   Oriented x 3, normal affect.          The above findings are documentation of my personal physical examination from today.  Electronically signed by:  Octaviano Sampson MD  02/08/24  17:22 EST      PFTs:  Full pulmonary function testing was done on 8/29/2023.  Please see scanned PFT report for complete details.  FVC was 3.62 L or 78% predicted.  FEV1 was 2.43 L or 72% predicted.  FEV1 FVC ratio 67%.  Postbronchodilator FEV1 2.78 L or 83% predicted, a 14% increase from prebronchodilator.  Maximal voluntary ventilation was 89 L/min or 69% predicted.  Total lung capacity was 6.45 L or 93% predicted.  Residual volume 2.74 L or 100% predicted.  DLCO 91% predicted.    Interpretation: Moderate obstruction with significant response to bronchodilator.  Low maximal voluntary ventilation.  No restriction.  No air trapping or hyperinflation.  Normal DLCO.  No prior study available for comparison.  Study is consistent with stage II COPD with asthmatic component.  I suspect there is some hilar adenopathy.  There is evidence of prior granulomatous disease.    Imaging: I reviewed both the images and radiologist's report.    Chest x-ray 2/2/2024 reviewed and shows a right-sided pacemaker in place with postsurgical changes to the chest from right lower lobectomy including stable small right-sided pleural effusion and basilar scarring.    I reviewed again prior CT scans of the chest from August 10, 2023, July 19, 2022, July 20, 2021, January 22, 2020, July 15, 2019, and January 4, 2019.  Patient has a cavitary right lower lobe superior segment lung nodule that appears to have grown from the most recent  CT scan and become more dense.  Looking back on prior CT scans this area has had varying densities and appears to have an underlying cystic structure.    Assessment & Plan   Problems Addressed this Visit          Hematology and Neoplasia    Malignant neoplasm of lower lobe of right lung - Primary       Pulmonary and Pneumonias    Centrilobular emphysema       Symptoms and Signs    Mediastinal adenopathy       Tobacco    History of tobacco use, presenting hazards to health     Diagnoses         Codes Comments    Malignant neoplasm of lower lobe of right lung    -  Primary ICD-10-CM: C34.31  ICD-9-CM: 162.5     Centrilobular emphysema     ICD-10-CM: J43.2  ICD-9-CM: 492.8     History of tobacco use, presenting hazards to health     ICD-10-CM: Z87.891  ICD-9-CM: V15.82     Mediastinal adenopathy     ICD-10-CM: R59.0  ICD-9-CM: 785.6             Discussion:    73 y.o. male active smoker of 1/2 PPD up to 2 PPD for 50 years with a history of HTN, HL, here for evaluation of a right sided lung nodule.  Had bronchoscopy/EBUS in 2020 at the [facility] that was reportedly benign.  He worked in construction up to age 71.  Had PPM in 2021 by Dr. Box and is followed by Dr. Pop.      Patient has an appointment at the [facility] on September 13th for a procedure but patient/family do not want it done there.      Patient denies dyspnea, hemoptysis, unexplained weight loss, or palpable adenopathy.  He had an episode a few months ago during which time he had worsening dyspnea.  He does not take current inhalers.      Evaluation today shows moderate obstruction on pulmonary function testing with significant response to bronchodilator.  I feel this is consistent with COPD with asthmatic component.  By history, the patient likely had a COPD exacerbation a couple months ago.    Review of imaging shows a right lower lobe superior segment nodule that appears to have grown in density.  This nodule is partially cavitary which likely reflects  an underlying partially cystic structure.    I performed a bronchoscopy with EBUS on 9/15/2023.  Results from biopsies showed a nondiagnostic sample from the right lower lobe however the station 7 lymph node was positive for non-small cell carcinoma.  Patient has undergone a right lower lobe lobectomy by Dr. Patel on 1/9/2024.  He received neoadjuvant chemotherapy/immunotherapy under the direction of Dr. Ashley.  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  No evidence of malignancy in the lymph nodes.  He is doing quite well postoperatively.  He is following with Dr. Ashley with plans for immunotherapy going forward.    Plan:  1.  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  Subsequently underwent neoadjuvant chemoimmunotherapy followed by right lower lobectomy by Dr. Patel 1/9/2024.  Follows with Dr. Ashley from oncology.  Plan for immunotherapy.   2.  For mediastinal adenopathy: Status post EBUS with station 7 positive for non-small cell carcinoma.  Negative path on resection after neoadjuvant chemoimmunotherapy.    3.  For COPD with asthmatic component: Continue Trelegy 100.  Albuterol rescue inhaler 2 puffs every 4-6 hours as needed.  4.  For history of tobacco use presenting hazards to health: Continue imaging follow-up (per oncology at this point).  Patient has quit smoking.  Recommend continued smoking avoidance.  5.  For health maintenance: Recommend yearly influenza vaccination.  Recommend pneumonia vaccination.  Patient currently up-to-date on pneumonia/influenza.  Would recommend COVID/RSV vaccinations.         Immunization History   Administered Date(s) Administered    ABRYSVO (RSV, 60+ or pregnant women 32-36 wks) 10/16/2023    COVID-19 (PFIZER) BIVALENT 12+YRS 09/16/2022    COVID-19 (PFIZER) Purple Cap Monovalent 01/29/2021, 02/19/2021, 10/18/2021,  04/14/2022    COVID-19 F23 (PFIZER) 12YRS+ (COMIRNATY) 09/26/2023    Fluzone High-Dose 65+yrs 10/06/2023    Pneumococcal Conjugate 20-Valent (PCV20) 10/11/2023       Social History     Tobacco Use   Smoking Status Former    Packs/day: 1.00    Years: 53.00    Additional pack years: 0.00    Total pack years: 53.00    Types: Cigarettes    Start date: 1/1/1968   Smokeless Tobacco Never   Tobacco Comments    Pt states that he quit smoking on 1/8/24. The day before his sx.         David Barfield  reports that he has quit smoking. His smoking use included cigarettes. He started smoking about 56 years ago. He has a 53.00 pack-year smoking history. He has never used smokeless tobacco..             Advance Care Planning   ACP discussion was held with the patient during this visit. Patient has an advance directive in EMR which is still valid.  [place] MOST form given 8/29/2023.        I personally spent a total of 51 minutes on patient visit today including chart review, face to face with the patient obtaining the history and physical exam, review of pertinent images and tests, counseling and discussion and/or coordination of care as described above, and documentation.  Total time excludes time spent on other separate services such as performing procedures or test interpretation, if applicable.      Electronically signed by:  Octaviano Sampson MD  02/08/24  17:22 EST    Please note that portions of this note were completed with [employer] - a voice recognition program.

## 2024-02-10 PROCEDURE — 87637 SARSCOV2&INF A&B&RSV AMP PRB: CPT | Performed by: NURSE PRACTITIONER

## 2024-02-12 ENCOUNTER — TELEPHONE (OUTPATIENT)
Dept: ONCOLOGY | Facility: CLINIC | Age: 74
End: 2024-02-12
Payer: MEDICARE

## 2024-02-15 ENCOUNTER — LAB (OUTPATIENT)
Dept: LAB | Facility: HOSPITAL | Age: 74
End: 2024-02-15
Payer: MEDICARE

## 2024-02-15 ENCOUNTER — OFFICE VISIT (OUTPATIENT)
Dept: ONCOLOGY | Facility: CLINIC | Age: 74
End: 2024-02-15
Payer: MEDICARE

## 2024-02-15 ENCOUNTER — HOSPITAL ENCOUNTER (OUTPATIENT)
Dept: CT IMAGING | Facility: HOSPITAL | Age: 74
Discharge: HOME | End: 2024-02-15
Payer: MEDICARE

## 2024-02-15 VITALS
HEIGHT: 72 IN | TEMPERATURE: 97.5 F | SYSTOLIC BLOOD PRESSURE: 133 MMHG | BODY MASS INDEX: 25.6 KG/M2 | HEART RATE: 71 BPM | DIASTOLIC BLOOD PRESSURE: 78 MMHG | WEIGHT: 189 LBS | OXYGEN SATURATION: 97 %

## 2024-02-15 DIAGNOSIS — R06.02 SHORTNESS OF BREATH: ICD-10-CM

## 2024-02-15 DIAGNOSIS — R06.02 SHORTNESS OF BREATH: Primary | ICD-10-CM

## 2024-02-15 LAB
ALBUMIN SERPL-MCNC: 3.6 G/DL (ref 3.5–5.2)
ALBUMIN/GLOB SERPL: 0.9 G/DL
ALP SERPL-CCNC: 111 U/L (ref 39–117)
ALT SERPL W P-5'-P-CCNC: 43 U/L (ref 1–41)
ANION GAP SERPL CALCULATED.3IONS-SCNC: 9 MMOL/L (ref 5–15)
AST SERPL-CCNC: 40 U/L (ref 1–40)
BASOPHILS # BLD AUTO: 0.03 10*3/MM3 (ref 0–0.2)
BASOPHILS NFR BLD AUTO: 0.2 % (ref 0–1.5)
BILIRUB SERPL-MCNC: 0.2 MG/DL (ref 0–1.2)
BUN SERPL-MCNC: 15 MG/DL (ref 8–23)
BUN/CREAT SERPL: 16.9 (ref 7–25)
CALCIUM SPEC-SCNC: 9.6 MG/DL (ref 8.6–10.5)
CHLORIDE SERPL-SCNC: 97 MMOL/L (ref 98–107)
CO2 SERPL-SCNC: 27 MMOL/L (ref 22–29)
CREAT SERPL-MCNC: 0.89 MG/DL (ref 0.76–1.27)
DEPRECATED RDW RBC AUTO: 53.1 FL (ref 37–54)
EGFRCR SERPLBLD CKD-EPI 2021: 89.9 ML/MIN/1.73
EOSINOPHIL # BLD AUTO: 0.67 10*3/MM3 (ref 0–0.4)
EOSINOPHIL NFR BLD AUTO: 4.7 % (ref 0.3–6.2)
ERYTHROCYTE [DISTWIDTH] IN BLOOD BY AUTOMATED COUNT: 15 % (ref 12.3–15.4)
GLOBULIN UR ELPH-MCNC: 3.9 GM/DL
GLUCOSE SERPL-MCNC: 110 MG/DL (ref 65–99)
HCT VFR BLD AUTO: 34.6 % (ref 37.5–51)
HGB BLD-MCNC: 11.6 G/DL (ref 13–17.7)
IMM GRANULOCYTES # BLD AUTO: 0.1 10*3/MM3 (ref 0–0.05)
IMM GRANULOCYTES NFR BLD AUTO: 0.7 % (ref 0–0.5)
LYMPHOCYTES # BLD AUTO: 1.66 10*3/MM3 (ref 0.7–3.1)
LYMPHOCYTES NFR BLD AUTO: 11.6 % (ref 19.6–45.3)
MCH RBC QN AUTO: 32 PG (ref 26.6–33)
MCHC RBC AUTO-ENTMCNC: 33.5 G/DL (ref 31.5–35.7)
MCV RBC AUTO: 95.6 FL (ref 79–97)
MONOCYTES # BLD AUTO: 1.28 10*3/MM3 (ref 0.1–0.9)
MONOCYTES NFR BLD AUTO: 8.9 % (ref 5–12)
NEUTROPHILS NFR BLD AUTO: 10.63 10*3/MM3 (ref 1.7–7)
NEUTROPHILS NFR BLD AUTO: 73.9 % (ref 42.7–76)
NT-PROBNP SERPL-MCNC: 176.1 PG/ML (ref 0–900)
PLATELET # BLD AUTO: 294 10*3/MM3 (ref 140–450)
PMV BLD AUTO: 8.1 FL (ref 6–12)
POTASSIUM SERPL-SCNC: 3.9 MMOL/L (ref 3.5–5.2)
PROT SERPL-MCNC: 7.5 G/DL (ref 6–8.5)
RBC # BLD AUTO: 3.62 10*6/MM3 (ref 4.14–5.8)
SODIUM SERPL-SCNC: 133 MMOL/L (ref 136–145)
WBC NRBC COR # BLD AUTO: 14.37 10*3/MM3 (ref 3.4–10.8)

## 2024-02-15 PROCEDURE — 83880 ASSAY OF NATRIURETIC PEPTIDE: CPT

## 2024-02-15 PROCEDURE — 80053 COMPREHEN METABOLIC PANEL: CPT

## 2024-02-15 PROCEDURE — 85025 COMPLETE CBC W/AUTO DIFF WBC: CPT

## 2024-02-15 PROCEDURE — 25510000001 IOPAMIDOL PER 1 ML: Performed by: INTERNAL MEDICINE

## 2024-02-15 PROCEDURE — 71275 CT ANGIOGRAPHY CHEST: CPT

## 2024-02-15 PROCEDURE — 36415 COLL VENOUS BLD VENIPUNCTURE: CPT

## 2024-02-15 RX ORDER — LEVOFLOXACIN 500 MG/1
500 TABLET, FILM COATED ORAL DAILY
Qty: 7 TABLET | Refills: 0 | Status: SHIPPED | OUTPATIENT
Start: 2024-02-15 | End: 2024-02-22

## 2024-02-15 RX ADMIN — IOPAMIDOL 85 ML: 755 INJECTION, SOLUTION INTRAVENOUS at 14:43

## 2024-02-21 ENCOUNTER — HOSPITAL ENCOUNTER (OUTPATIENT)
Dept: CT IMAGING | Facility: HOSPITAL | Age: 74
Discharge: HOME | End: 2024-02-21
Admitting: INTERNAL MEDICINE
Payer: MEDICARE

## 2024-02-21 ENCOUNTER — OFFICE VISIT (OUTPATIENT)
Dept: ONCOLOGY | Facility: CLINIC | Age: 74
End: 2024-02-21
Payer: MEDICARE

## 2024-02-21 VITALS
HEART RATE: 73 BPM | OXYGEN SATURATION: 98 % | DIASTOLIC BLOOD PRESSURE: 68 MMHG | BODY MASS INDEX: 24.64 KG/M2 | HEIGHT: 72 IN | SYSTOLIC BLOOD PRESSURE: 116 MMHG | WEIGHT: 181.9 LBS | TEMPERATURE: 97.1 F

## 2024-02-21 DIAGNOSIS — C34.31 MALIGNANT NEOPLASM OF LOWER LOBE OF RIGHT LUNG: Primary | ICD-10-CM

## 2024-02-21 DIAGNOSIS — Z09 CHEMOTHERAPY FOLLOW-UP EXAMINATION: ICD-10-CM

## 2024-02-21 DIAGNOSIS — C34.31 MALIGNANT NEOPLASM OF LOWER LOBE OF RIGHT LUNG: ICD-10-CM

## 2024-02-21 LAB — CREAT BLDA-MCNC: 1.2 MG/DL (ref 0.6–1.3)

## 2024-02-21 PROCEDURE — 71260 CT THORAX DX C+: CPT

## 2024-02-21 PROCEDURE — 25510000001 IOPAMIDOL 61 % SOLUTION: Performed by: INTERNAL MEDICINE

## 2024-02-21 PROCEDURE — 82565 ASSAY OF CREATININE: CPT

## 2024-02-21 PROCEDURE — 74177 CT ABD & PELVIS W/CONTRAST: CPT

## 2024-02-21 RX ORDER — PROCHLORPERAZINE MALEATE 10 MG
5 TABLET ORAL EVERY 6 HOURS PRN
Qty: 30 TABLET | Refills: 1 | Status: SHIPPED | OUTPATIENT
Start: 2024-02-21

## 2024-02-21 RX ADMIN — IOPAMIDOL 85 ML: 612 INJECTION, SOLUTION INTRAVENOUS at 11:06

## 2024-02-21 NOTE — PROGRESS NOTES
[facility]ology [facility] [place]  Office number [phone]    Fax number [phone]     Follow up     Date: 24    Patient Name: David Barfield  MRN: [id number]  : 1950    Referring Physician: Dr. Sampson    Chief Complaint: Nonsmall cell lung cancer follow-up on treatment    Cancer Staging:  Cancer Staging   Stage IIIA (cT2b, cN2, cM0)    History of Present Illness: David Barfield is a pleasant 73 y.o. male who presents today for evaluation of NSCLC. He has a 50 pack year smoking history.    He has a history of a PET avid subpleural RLL lung nodule [facility] [place] [facility]. This had an SUV of 9.8 on PET  in association with increased mediastinal LN uptake with SUV around 3. Imaging was felt secondary to osteophyte fibrosis. He did undergo bronchoscopy 2020 with negative cytology. The RLL lung nodule was not felt amenable to bronchoscopy biopsy.    CT lung screen 2023 showed:      PET CT showed mass in the RLL intensely SUV avid, max 17. Mediastinal LN not hypermetabolic above baseline.     Right lower lobe robotic transbronchial biopsy and cytology on 9/15/2023 showed benign findings. Cultures including AFT and fungal were negative.  Station 11L, Station 4L LN negative  Station 7 LN showed NSCLCa (positive for cytokeratin 7, p63, and TTF-1 with no significant staining for p40 or Napsin. The staining pattern raises the possibility of mixed adenocarcinoma and squamous cell carcinoma differentiation in this tumor). PDL1 negative.    Tempus negative for targetable mutations on liquid biopsy. QNS on tissue at diagnosis.    MRI brain was negative.    He has a history of sick sinus syndrome s/p PPM and moderate COPD. He describes only mild physical limitations from this. For example he recently walked 1.5 miles at [employer] [facility]. At home, he climbs 17 steps without issue. He does sit down with long activities.     Following neoadjuvant chemoimmunotherapy  he underwent lobectomy and mediastinal lymph node dissection on January 9, 2024.  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  Margins were negative.  Regional lymph nodes including 4R, 12 R, and 7 were negative as were 5 intralobar lymph nodes.    Treatment history:  Carbo, taxol, nivo, C1 10/24/23; C2 11/15/23; C3 11/21/23  Atezolizumab maintenance, cycle 1 2/6/2024    Interval history:   Mr. Barfield is here for a follow up visit.  He is feeling somewhat better in the last week.  His cough is improved and is less productive.  He will complete Levaquin tomorrow.  He has persisting pain in the right lateral chest wall and some edema in this area.  He is taking Lortab less frequently but this does give him some pain relief.  He has had intermittent constipation, worse when he can find Zofran with the Lortab.  He has a low appetite but denies specific nausea.  He is using Ensure and eating twice daily.  He notes the smell of food makes him sick.  He had constipation that resolved with Dulcolax followed by rebound diarrhea x 1.  Stools have been normal.  He is lost 7 pounds.  He endorses good fluid intake.  Less short of breath.      Past Medical History:   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    Diabetes mellitus Borderline    Emphysema/COPD     Erectile disorder     GERD (gastroesophageal reflux disease)     History of chemotherapy     Hyperlipidemia     Hypertension 2019    Lung nodule     Mumps     Mumps     Pruritus     after bath    Slow to wake up after anesthesia     Wears dentures     upper only    Wears hearing aid in both ears     usually only wears right   No personal history of myocardial infarction, cerebrovascular event, or venous thromboembolism.  No autoimmune  diseases.   No prior cscope, mailed cologuard recently    Past Surgical History:   Past Surgical History:   Procedure Laterality Date    BONE BIOPSY      broken bone surgery in his face    BRONCHOSCOPY THORACOTOMY Right 1/9/2024    Procedure: THORACOTOMY FOR LOWER LOBECTOMY AND MEDISTINAL LYMPH NODE DISSECTION RIGHT;  Surgeon: Joey Patel MD;  Location: [facility];  Service: Cardiothoracic;  Laterality: Right;    BRONCHOSCOPY WITH ION ROBOTIC ASSIST N/A 09/15/2023    Procedure: BRONCHOSCOPY NAVIGATION WITH ENDOBRONCHIAL ULTRASOUND AND ION ROBOT;  Surgeon: Octaviano Sampson MD;  Location:  [facility];  Service: Robotics - Pulmonary;  Laterality: N/A;  ion #6 - [id number]  - 0015  Cath guide 0061    EBUS balloon removed and intact    CARDIAC ELECTROPHYSIOLOGY PROCEDURE N/A 08/17/2021    Procedure: Pacemaker DC new;  Surgeon: Kayy Box MD;  Location:  [facility];  Service: Cardiology;  Laterality: N/A;    FACIAL FRACTURE SURGERY      PACEMAKER IMPLANTATION       Family History:   Family History   Problem Relation Age of Onset    Aneurysm Mother         brain    Dementia Father     Leukemia Sister     Hypertension Paternal Grandfather     Heart disease Paternal Grandmother    Sister leukemia at age 21  No other malignancy    Social History:   Social History     Socioeconomic History    Marital status: Significant Other    Number of children: 3   Tobacco Use    Smoking status: Former     Packs/day: 1.00     Years: 53.00     Additional pack years: 0.00     Total pack years: 53.00     Types: Cigarettes     Start date: 1/1/1968    Smokeless tobacco: Never    Tobacco comments:     Pt states that he quit smoking on 1/8/24. The day before his sx.    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 port, Disp: , Rfl:     cefdinir (OMNICEF) 300 MG capsule, Take 1 capsule by mouth 2 (Two) Times a Day., Disp: 10 capsule, Rfl: 0    fluticasone (VERAMYST) 27.5 MCG/SPRAY nasal spray, 2 sprays into the nostril(s) as directed by provider 1 (One) Time As Needed for Rhinitis or Allergies., Disp: 6 mL, Rfl: 2    Fluticasone-Umeclidin-Vilant (Trelegy Ellipta) 100-62.5-25 MCG/ACT inhaler, Inhale 1 puff Daily., Disp: 3 each, Rfl: 3    HYDROcodone-acetaminophen (Norco) 7.5-325 MG per tablet, Take 1 tablet by mouth Every 6 (Six) Hours As Needed for Moderate Pain., Disp: 25 tablet, Rfl: 0    levoFLOXacin (Levaquin) 500 MG tablet, Take 1 tablet by mouth Daily for 7 days. 1 tablet, Disp: 7 tablet, Rfl: 0    lidocaine-prilocaine (EMLA) 2.5-2.5 % cream, Apply 1 application  topically to the appropriate area as directed As Needed (45-60 minutes prior to port access.  Cover with saran/plastic wrap.)., Disp: 30 g, Rfl: 3    lisinopril (PRINIVIL,ZESTRIL) 2.5 MG tablet, Take 1 tablet by mouth As Needed (elevated blood pressure). Take 1/2 tablet po prn (Patient taking differently: Take 1 tablet by mouth As Needed (elevated blood pressure systolic over 140). Take 1/2 tablet po prn), Disp: 30 tablet, Rfl: 1    omeprazole (priLOSEC) 40 MG capsule, Take 1 capsule by mouth Daily., Disp: 30 capsule, Rfl: 5    ondansetron (ZOFRAN) 8 MG tablet, Take 1 tablet by mouth 3 (Three) Times a Day As Needed for Nausea or Vomiting., Disp: 30 tablet, Rfl: 2    pravastatin (PRAVACHOL) 80 MG tablet, Take 1 tablet by mouth Every Night., Disp: 30 tablet, Rfl: 11    sildenafil (VIAGRA) 100 MG tablet, Take 0.5 tablets by mouth Daily  "As Needed for Erectile Dysfunction., Disp: , Rfl:     tamsulosin (FLOMAX) 0.4 MG capsule 24 hr capsule, Take 1 capsule by mouth Daily., Disp: 30 capsule, Rfl: 1  No current facility-administered medications for this visit.    Allergies:   Allergies   Allergen Reactions    Cymbalta [Duloxetine Hcl] GI Intolerance    Gabapentin Mental Status Change     Pt states that this medication \"makes him feel foolish in his head\".     Toradol [Ketorolac Tromethamine] GI Intolerance     Projectile vomiting     Latex Other (See Comments)     Latex allergy     Tape Rash       Objective     Vital Signs:   Vitals:    02/21/24 1315   BP: 116/68   Pulse: 73   Temp: 97.1 °F (36.2 °C)   TempSrc: Temporal   SpO2: 98%   Weight: 82.5 kg (181 lb 14.4 oz)   Height: 182.9 cm (72.01\")   PainSc:   2    Body mass index is 24.66 kg/m².   Pain Score    02/21/24 1315   PainSc:   2       ECOG Performance Status: 1 - Symptomatic but completely ambulatory    Physical Exam:   General: No acute distress.   HEENT: Normocephalic, atraumatic. Sclera anicteric.   Neck: supple, no adenopathy.   Cardiovascular: RRR  Respiratory: Occasional wheezing, good air movement.  Abdomen: Soft, nontender, non distended with normoactive bowel sounds  Lymph: no cervical, supraclavicular or axillary adenopathy  Neuro: Alert and oriented x 3. No focal deficits.   Ext: Symmetric, no swelling.     Laboratory/Imaging Reviewed:   Lab on 02/15/2024   Component Date Value Ref Range Status    Glucose 02/15/2024 110 (H)  65 - 99 mg/dL Final    BUN 02/15/2024 15  8 - 23 mg/dL Final    Creatinine 02/15/2024 0.89  0.76 - 1.27 mg/dL Final    Sodium 02/15/2024 133 (L)  136 - 145 mmol/L Final    Potassium 02/15/2024 3.9  3.5 - 5.2 mmol/L Final    Chloride 02/15/2024 97 (L)  98 - 107 mmol/L Final    CO2 02/15/2024 27.0  22.0 - 29.0 mmol/L Final    Calcium 02/15/2024 9.6  8.6 - 10.5 mg/dL Final    Total Protein 02/15/2024 7.5  6.0 - 8.5 g/dL Final    Albumin 02/15/2024 3.6  3.5 - 5.2 g/dL " Final    ALT (SGPT) 02/15/2024 43 (H)  1 - 41 U/L Final    AST (SGOT) 02/15/2024 40  1 - 40 U/L Final    Alkaline Phosphatase 02/15/2024 111  39 - 117 U/L Final    Total Bilirubin 02/15/2024 0.2  0.0 - 1.2 mg/dL Final    Globulin 02/15/2024 3.9  gm/dL Final    Calculated Result    A/G Ratio 02/15/2024 0.9  g/dL Final    BUN/Creatinine Ratio 02/15/2024 16.9  7.0 - 25.0 Final    Anion Gap 02/15/2024 9.0  5.0 - 15.0 mmol/L Final    eGFR 02/15/2024 89.9  >60.0 mL/min/1.73 Final    proBNP 02/15/2024 176.1  0.0 - 900.0 pg/mL Final    WBC 02/15/2024 14.37 (H)  3.40 - 10.80 10*3/mm3 Final    RBC 02/15/2024 3.62 (L)  4.14 - 5.80 10*6/mm3 Final    Hemoglobin 02/15/2024 11.6 (L)  13.0 - 17.7 g/dL Final    Hematocrit 02/15/2024 34.6 (L)  37.5 - 51.0 % Final    MCV 02/15/2024 95.6  79.0 - 97.0 fL Final    MCH 02/15/2024 32.0  26.6 - 33.0 pg Final    MCHC 02/15/2024 33.5  31.5 - 35.7 g/dL Final    RDW 02/15/2024 15.0  12.3 - 15.4 % Final    RDW-SD 02/15/2024 53.1  37.0 - 54.0 fl Final    MPV 02/15/2024 8.1  6.0 - 12.0 fL Final    Platelets 02/15/2024 294  140 - 450 10*3/mm3 Final    Neutrophil % 02/15/2024 73.9  42.7 - 76.0 % Final    Lymphocyte % 02/15/2024 11.6 (L)  19.6 - 45.3 % Final    Monocyte % 02/15/2024 8.9  5.0 - 12.0 % Final    Eosinophil % 02/15/2024 4.7  0.3 - 6.2 % Final    Basophil % 02/15/2024 0.2  0.0 - 1.5 % Final    Immature Grans % 02/15/2024 0.7 (H)  0.0 - 0.5 % Final    Neutrophils, Absolute 02/15/2024 10.63 (H)  1.70 - 7.00 10*3/mm3 Final    Lymphocytes, Absolute 02/15/2024 1.66  0.70 - 3.10 10*3/mm3 Final    Monocytes, Absolute 02/15/2024 1.28 (H)  0.10 - 0.90 10*3/mm3 Final    Eosinophils, Absolute 02/15/2024 0.67 (H)  0.00 - 0.40 10*3/mm3 Final    Basophils, Absolute 02/15/2024 0.03  0.00 - 0.20 10*3/mm3 Final    Immature Grans, Absolute 02/15/2024 0.10 (H)  0.00 - 0.05 10*3/mm3 Final   Admission on 02/10/2024, Discharged on 02/10/2024   Component Date Value Ref Range Status    Rapid Strep A Screen  02/10/2024 Negative   Final    Internal Control 02/10/2024 Passed   Final    Lot Number 02/10/2024 [id number]   Final    Expiration Date 02/10/2024 10/01/2026   Final    COVID19 02/10/2024 Not Detected  Not Detected - Ref. Range Final    POC Influenza A, Molecular 02/10/2024 Not Detected  Negative / Not Detected Final    POC Influenza B, Molecular 02/10/2024 Not Detected  Negative / Not Detected Final    Internal Control 02/10/2024 Passed  Passed Final    Lot Number 02/10/2024 [id number]   Final    Expiration Date 02/10/2024 6/30/25   Final    COVID19 02/10/2024 Not Detected  Not Detected - Ref. Range Final    Influenza A PCR 02/10/2024 Not Detected  Not Detected Final    Influenza B PCR 02/10/2024 Not Detected  Not Detected Final    RSV, PCR 02/10/2024 Not Detected  Not Detected Final       CT Angiogram Chest    Result Date: 2/15/2024  Narrative: CT ANGIOGRAM CHEST Date of Exam: 2/15/2024 2:31 PM EST Indication: shortness of breath. Comparison: Chest radiograph 2/10/2024, PET/CT 12/27/2023. Technique: CTA of the chest was performed before and after the uneventful intravenous administration of 85 mL Isovue-370. Reconstructed coronal and sagittal images were also obtained. In addition, a 3-D volume rendered image was created for interpretation. Automated exposure control and iterative reconstruction methods were used. Findings: Pulmonary arteries / cardiovascular: No intraluminal filling defect to suggest pulmonary embolus. The pulmonary arteries are normal in caliber. No pericardial effusion. Normal caliber thoracic aorta. Left chest port. Right chest pacemaker. Coronary artery calcifications. Lymph nodes and mediastinum: Multiple mildly enlarged mediastinal and right hilar lymph nodes, unchanged in size compared to prior PET/CT. Lungs and airways: Postsurgical changes of right lower lobectomy. Mild centrilobular and paraseptal emphysema. Scattered benign calcified granulomas. Subsegmental atelectasis as well as  scattered groundglass attenuation in the residual right lung. Pleura: Small/moderate volume loculated right pleural effusion. No left pleural effusion. No pneumothorax.  Bones and soft tissues: No suspicious osseous abnormality. Healing right lateral fifth and sixth rib fractures, likely related to recent thoracotomy. Postsurgical soft tissue change. Upper abdomen: Visualized portions unremarkable.     Impression: Impression: No evidence of pulmonary embolism. Postsurgical changes of right lower lobectomy with small/moderate volume right pleural effusion, which appears loculated. Scattered areas of groundglass attenuation in the remaining right lung, likely combination of postsurgical change and atelectasis, though a superimposed infectious/inflammatory process is possible. Similar mildly enlarged mediastinal lymph nodes. Healing right lateral fifth and sixth rib fractures, likely related to recent thoracotomy. Electronically Signed: Marco Leahy MD  2/15/2024 4:35 PM EST  Workstation ID: [id number]    XR Chest 2 View    Result Date: 2/10/2024  Narrative: XR CHEST 2 VW Date of Exam: 2/10/2024 3:24 PM EST Indication: shortness of breath, fever Comparison: 2/2/2024 Findings: Left-sided Port-A-Cath and right-sided dual-lead pacemaker are again noted. The heart shadow is normal in size. Pulmonary vasculature is upper limits of normal. There is persistent right basilar pleural scarring and mild parenchymal scarring. A few granulomatous calcifications are also noted. Today's study appears to show a superimposed pattern of mild diffuse peribronchial thickening, increased from prior studies, potentially viral syndrome/bronchitis. This is particularly well seen on the lateral view. No airspace disease or focal interstitial disease is seen to suggest a developing pneumonia.     Impression: Impression: 1. Stable right basilar pleural scarring and mild chronic appearing changes elsewhere. 2. Diffusely increased peribronchial  thickening suspicious for superimposed viral syndrome/bronchitis Electronically Signed: Baljeet Carlos MD  2/10/2024 3:41 PM EST  Workstation ID: [id number]    XR Chest 2 View    Result Date: 2/3/2024  Narrative: XR CHEST 2 VW Date of Exam: 2/2/2024 9:59 AM EST Indication: RIGHT PLEURAL EFFUSION Comparison: 1/22/2024. Findings: Persistent small chronic right-sided pleural effusion and right basilar scarring. Left lung remains clear. No pneumothorax. There is volume loss in the right lung. Heart size and pulmonary vasculature appear within normal limits. Stable right-sided ICD and left-sided chest port.     Impression: Impression: Stable small right-sided pleural effusion and right basilar scarring. Electronically Signed: Raulito Crenshaw MD  2/3/2024 5:26 PM EST  Workstation ID: [id number]    XR Chest 2 View    Result Date: 1/22/2024  Narrative: XR CHEST 2 VW Date of Exam: 1/22/2024 1:23 PM CST Indication: S/P RIGHT LOWER LOBECTOMY Comparison: Chest x-ray 1/18/2024 Findings: The heart is stable in size. Cardiac pacemaker is noted. There is a left central line catheter with the distal tip overlying the distal superior vena cava. Small right apical pneumothorax is noted measuring 5 mm. There is right lower lobe volume loss and  pleural effusion. Mild left basilar atelectasis is present.     Impression: Impression: Stable appearance to a right apical pneumothorax. Right lower lobe volume loss and pleural effusion not significantly changed from prior study. Electronically Signed: Sugar Gongora MD  1/22/2024 4:27 PM CST  Workstation ID: [id number]     Procedures    Bronch 6/2020  Findings:       An EBUS bronchoscope was advanced through the endotracheal tube under        general anesthesia. A comprehensive EBUS survey of all available lymph        node stations revealed lymphadenopathy with benign sonographic features        in stations 11L (9 mm), 4L (8 mm), 7 (15 mm), 4R (11 mm), and 11R (10        mm). EBUS-TBNA x 4 passes (at  least) was done at each of those stations        in that sequence with the Olympus ViziShot 21G and 22G needles.       We then withdrew the EBUS scope and inserted a therapeutic scope into        the airway. A thorough airway exam to the first subsegmental level was        unremarkable without endobronchial lesions or secretions. A BAL was        obtained from the right lower lobe superior segment (RB6) (90 ml        instilled, 18 ml of cloudy fluid returned). Adequate hemostasis was        confirmed prior to withdrawing the scope. Patient tolerated procedure.  Impression:           Abnormal CT scan of chest                        1. Successful endobronchial ultrasound bronchoscopy                         with fine needle aspiration.                        2. Mediastinal/hilar lymphadenopathy with benign                         sonographic features in stations 11L, 4L, 7, 4R and 11R.                        3. EBUS-TBNA samples from stations 11L, 4L, 7, 4R and                         11R.                        4. Normal airway anatomy without active bleeding,                         endobronchial lesions or secretions.                        5. BAL from RLL     11R Lymph Node, (EBUS) Fine Needle Aspiration:  - Negative for malignancy  - Abundant lymphoid tissue and histiocytes with anthracotic pigments.     4R Lymph Node, (EBUS) Fine Needle Aspiration:  - Negative for malignancy.  - Abundant lymphoid tissue present.     Subcarinal Lymph Node, (EBUS) Fine Needle Aspiration:  - Negative for malignancy  - Abundant lymphoid tissue present.     4L Lymph Node, (EBUS) Fine Needle Aspiration:  - Negative for malignancy  - Abundant lymphoid tissue.     11L Lymph Node, (EBUS) Fine Needle Aspiration:  - Negative for malignancy.  - Abundant lymphoid tissue and histiocytes with anthracotic pigments.     Bronchoalveolar Lavage, RLL (ThinPrep only):  NO MALIGNANT CELLS IDENTIFIED  Benign respiratory epithelial cells and pulmonary  macrophages       Microbiology Results     Date and Time Order Name Sensitivity Status Organisms Specimen ID Source     2020 1030 Fungus Culture and Smear   Preliminary   [id number] Lung     2020 1030 BAL/Brush Culture plus Stain, Semiquant.   Final   [id number] Lung     2020 1030 Acid Fast Culture Plus Stain   Preliminary   [id number] Lung   TRANSTHORACIC ECHOCARDIOGRAPHY REPORT   Demographics   Patient Name:          ORDGER ROB  :            1950   Medical Record Number: [id number]          Age:            73 year(s)   Corporate ID Number:   [id number]          Gender          Male   Account Number:        [id number]   Sonographer:           Faiza TREJO Height:         70 inches   Referring Physician:   JOHN NAVAS  Weight:         194.01 pounds   Interpreting           MEDARDO OBRIEN MD        BMI:            27.84 kg/m^2   Physician:   Date of Service:       2023          Blood Pressure: 168/100 mmHg   Room Number:           OP  Type of Study:   TTE procedure: ECHO COMPLETE (DOPPLER / COLOR) W OR WO CONTRAST.   Patient Status: Routine OP   Study Location: Echo LabTechnical Quality: Adequate visualization   Impression:   Indication: Other forms of chronic ischemic heart disease I25.89   ########################################   Normal sized left ventricle. Moderate left ventricular hypertrophy.   Visually estimated ejection fraction 55% +/- 5%. Abnormal systolic strain   pattern. Normal left ventricular diastolic function.   No significant valvular heart disease.   ########################################  Measurements Summary:   LVEDd: 4.16 cm         LVESd: 3.03 cm          IVSEd: 1.45 cm   AO Root:3.76 cm        LVPWd: 1.38 cm   Contractility Score   At rest the following contractility abnormalities were noted: Hypokinesis   of the Basal infero-lateral, the Basal jalen-septal, the Basal   infero-septal, the Basal inferior and the Basal jalen-lateral segments.    Contractility of all other segments appeared normal.   LV regional wall motion: (0-Not visualized 1-Normal 2-Hypokinesis   3-Akinesis 4-Dyskinesis 5-Aneurysm)   Left Ventricle   Peak E-wave: 0.48   Peak A-wave: 0.62 m/s   E/A ratio: 0.78   m/s                 Volume [id number]   LV length: 8.71 cm   ml   Volume [id number] ml   LVOT diameter: 2.41 cm   Normal sized left ventricle.   Moderate left ventricular hypertrophy.   Visually estimated ejection fraction 55% +/- 5%.   Abnormal systolic strain pattern.   Normal left ventricular diastolic function.   No left ventricular masses or thrombi.   Right Ventricle   Diastolic dimension: 3.05     RV systolic pressure: 21.97 mmHg   cm   Normal sized right ventricle.   Pacing electrode in the right ventricle.   Normal right ventricular function.   Left Atrium   LA dimension: 3.34 cm               LA volume:52.02 ml   LA/Aorta: 0.89   Normal sized left atrium.   Normal left atrial volume index   Intact atrial septum.   No atrial mass or thrombus.   Right Atrium   Normal sized right atrium.   Pacer wire crosses the tricuspid valve.   Intact atrial septum.   No atrial mass or thrombus.   Mitral Valve   Deceleration time: 197.82 msec   Thickened mitral valve leaflets.   Trace mitral regurgitation.   No mitral stenosis.   No masses or vegetations seen.   Aortic Valve   AI P1/2t: 674.17 msec   LVOT VTI: 18.35 cm         Deceleration time: 2324.74 msec   Mildly thickend free edges of the aortic valve leaflets.   Mild aortic valve regurgitation.   No aortic stenosis.   No masses or vegetations seen.   Tricuspid Valve   TR velocity: 2.18 m/s     TR gradient: 18.69775 mmHg   Estimated RAP: 3 mmHg     RVSP: 21.97 mmHg   Structurally normal tricuspid valve.   Trace tricuspid regurgitation.   No tricuspid stenosis.   No masses or vegetations seen.   Pulmonic Valve   Acceleration time: 89.97 msec           PASP: 21.97 mmHg   Structurally normal pulmonic valve.   Abnormal pulmonary  acceleration time.   No pulmonic regurgitation.   No pulmonic stenosis.   No masses or vegetations seen.  Great Vessels  Aorta   Aortic Root: 3.76 cm   Ascending Aorta: 3.71 cm   LVOT Diameter: 2.41 cm  Visualized aorta is normal.  Mildly dilated aortic root 3.8 cm.  No evidence of dissection.  Normal IVC with appropriate collapse.   Pericardium / Pleura  No pericardial effusion.   ----------------------------------------------------------------   Electronically signed by MEDARDO OBRIEN MD(Interpreting Physician)   on 2023 16:31   ----------------------------------------------------------------  Procedure Note    Medardo Obrien MD - 2023  Formatting of this note might be different from the original.  TRANSTHORACIC ECHOCARDIOGRAPHY REPORT   Demographics   Patient Name:          RODGER RBO  :            1950   Medical Record Number: [id number]          Age:            73 year(s)   Corporate ID Number:   [id number]          Gender          Male   Account Number:        [id number]   Sonographer:           Faiza TREJO Height:         70 inches   Referring Physician:   JOHN NAVAS  Weight:         194.01 pounds   Interpreting           MEDARDO OBRIEN MD        BMI:            27.84 kg/m^2   Physician:   Date of Service:       2023          Blood Pressure: 168/100 mmHg   Room Number:           OP  Type of Study:   TTE procedure: ECHO COMPLETE (DOPPLER / COLOR) W OR WO CONTRAST.   Patient Status: Routine OP   Study Location: Echo LabTechnical Quality: Adequate visualization   Impression:   Indication: Other forms of chronic ischemic heart disease I25.89   ########################################   Normal sized left ventricle. Moderate left ventricular hypertrophy.   Visually estimated ejection fraction 55% +/- 5%. Abnormal systolic strain   pattern. Normal left ventricular diastolic function.   No significant valvular heart disease.    ########################################  Measurements Summary:   LVEDd: 4.16 cm         LVESd: 3.03 cm          IVSEd: 1.45 cm   AO Root:3.76 cm        LVPWd: 1.38 cm   Contractility Score   At rest the following contractility abnormalities were noted: Hypokinesis   of the Basal infero-lateral, the Basal jalen-septal, the Basal   infero-septal, the Basal inferior and the Basal jalen-lateral segments.   Contractility of all other segments appeared normal.   LV regional wall motion: (0-Not visualized 1-Normal 2-Hypokinesis   3-Akinesis 4-Dyskinesis 5-Aneurysm)   Left Ventricle   Peak E-wave: 0.48   Peak A-wave: 0.62 m/s   E/A ratio: 0.78   m/s                 Volume [id number]   LV length: 8.71 cm   ml   Volume agmanidn55.24 ml   LVOT diameter: 2.41 cm   Normal sized left ventricle.   Moderate left ventricular hypertrophy.   Visually estimated ejection fraction 55% +/- 5%.   Abnormal systolic strain pattern.   Normal left ventricular diastolic function.   No left ventricular masses or thrombi.   Right Ventricle   Diastolic dimension: 3.05     RV systolic pressure: 21.97 mmHg   cm   Normal sized right ventricle.   Pacing electrode in the right ventricle.   Normal right ventricular function.   Left Atrium   LA dimension: 3.34 cm               LA volume:52.02 ml   LA/Aorta: 0.89   Normal sized left atrium.   Normal left atrial volume index   Intact atrial septum.   No atrial mass or thrombus.   Right Atrium   Normal sized right atrium.   Pacer wire crosses the tricuspid valve.   Intact atrial septum.   No atrial mass or thrombus.   Mitral Valve   Deceleration time: 197.82 msec   Thickened mitral valve leaflets.   Trace mitral regurgitation.   No mitral stenosis.   No masses or vegetations seen.   Aortic Valve   AI P1/2t: 674.17 msec   LVOT VTI: 18.35 cm         Deceleration time: 2324.74 msec   Mildly thickend free edges of the aortic valve leaflets.   Mild aortic valve regurgitation.   No aortic stenosis.   No  masses or vegetations seen.   Tricuspid Valve   TR velocity: 2.18 m/s     TR gradient: 18.07450 mmHg   Estimated RAP: 3 mmHg     RVSP: 21.97 mmHg   Structurally normal tricuspid valve.   Trace tricuspid regurgitation.   No tricuspid stenosis.   No masses or vegetations seen.   Pulmonic Valve   Acceleration time: 89.97 msec           PASP: 21.97 mmHg   Structurally normal pulmonic valve.   Abnormal pulmonary acceleration time.   No pulmonic regurgitation.   No pulmonic stenosis.   No masses or vegetations seen.  Great Vessels  Aorta   Aortic Root: 3.76 cm   Ascending Aorta: 3.71 cm   LVOT Diameter: 2.41 cm  Visualized aorta is normal.  Mildly dilated aortic root 3.8 cm.  No evidence of dissection.  Normal IVC with appropriate collapse.   Pericardium / Pleura  No pericardial effusion.  Assessment / Plan      Assessment/Plan:     Nonsmall cell lung cancer of the RLL, Stage IIIA  History of immunotherapy  Pneumonia  -He has an enlarging RLL nodule with SUV of 17. Despite negative transbronchial biopsy, he was found to have N2 disease with a positive level 7 LN. We discussed options for definitive treatment to include induction chemo immunotherapy followed by surgical resection, chemoradiation followed by surgical resection, or definitive chemoradiation. We discussed differences in schedules and side effects. He has no contraindications to immunotherapy and I recommended nivolumab + chemotherapy induction.  His case was reviewed at multidisciplinary tumor board including thoracic surgery.  He was felt to be a good candidate for neoadjuvant chemo immunotherapy followed by resection assessment.  He completed 3 cycles of  nivolumab, carboplatin, paclitaxel x3 cycles in a neoadjuvant fashion.  He underwent right lower lobectomy and lymph node dissection.  -Final pathology reviewed and shows 1.4 cm of residual disease with extensive treatment effect.  The previously biopsied lymph node was negative on mediastinal  dissection.  -His case was reviewed at multidisciplinary tumor board and consensus was to proceed with adjuvant immunotherapy without any indication for further chemotherapy or adjuvant radiation. Will proceed with Atezolizumab per the [id number] regimen.  -Because his original bronchoscopy specimen was QNS for Tempus tissue testing, I ordered repeat Tempus on the resection specimen to rule out any EGFR or ALK mutation.  There was no mutation on liquid biopsy.  -He is status post for cycle of adjuvant atezolizumab.  He presents with fever, worsening productive cough, and posttussive vomiting last week.  He is clinically improving on Levaquin.  Will return to clinic in 1 week with consideration of next cycle of atezolizumab.    4.  Rib fracture  5.  Pain secondary #4  -Continue to use Lortab as needed.  I offered a trial of gabapentin which he declines for now.    6.  Weight loss  7.  Low appetite/food aversion  -Encouraged him to schedule Zofran or Compazine prior to meals and utilize nutrition supplements as indicated.  He will be completing antibiotics in the next 24 hours.  Hopefully his symptoms will improve with that.  Consider appetite stimulant if persists    Follow-up in 1 week.     Neetu Ashley MD  Hematology and Oncology       I have spent a total of 30 minutes on reviewing test results, preparing to see patient, counseling patient, performing medically appropriate exam and documenting clinical information in the electronic health record.

## 2024-02-22 NOTE — PROGRESS NOTES
Please let him know scans read after the office visit are stable to improve. No concerning findings.

## 2024-02-27 ENCOUNTER — OFFICE VISIT (OUTPATIENT)
Dept: ONCOLOGY | Facility: CLINIC | Age: 74
End: 2024-02-27
Payer: MEDICARE

## 2024-02-27 ENCOUNTER — HOSPITAL ENCOUNTER (OUTPATIENT)
Dept: ONCOLOGY | Facility: HOSPITAL | Age: 74
Discharge: HOME | End: 2024-02-27
Admitting: INTERNAL MEDICINE
Payer: MEDICARE

## 2024-02-27 VITALS
DIASTOLIC BLOOD PRESSURE: 69 MMHG | TEMPERATURE: 96.8 F | WEIGHT: 182 LBS | SYSTOLIC BLOOD PRESSURE: 117 MMHG | BODY MASS INDEX: 24.65 KG/M2 | OXYGEN SATURATION: 99 % | HEART RATE: 82 BPM | HEIGHT: 72 IN

## 2024-02-27 DIAGNOSIS — C34.31 MALIGNANT NEOPLASM OF LOWER LOBE OF RIGHT LUNG: Primary | ICD-10-CM

## 2024-02-27 DIAGNOSIS — Z51.12 ENCOUNTER FOR ANTINEOPLASTIC IMMUNOTHERAPY: ICD-10-CM

## 2024-02-27 LAB
ALBUMIN SERPL-MCNC: 3.6 G/DL (ref 3.5–5.2)
ALBUMIN/GLOB SERPL: 0.8 G/DL
ALP SERPL-CCNC: 93 U/L (ref 39–117)
ALT SERPL W P-5'-P-CCNC: 14 U/L (ref 1–41)
ANION GAP SERPL CALCULATED.3IONS-SCNC: 10 MMOL/L (ref 5–15)
AST SERPL-CCNC: 17 U/L (ref 1–40)
BASOPHILS # BLD AUTO: 0.02 10*3/MM3 (ref 0–0.2)
BASOPHILS NFR BLD AUTO: 0.2 % (ref 0–1.5)
BILIRUB SERPL-MCNC: 0.2 MG/DL (ref 0–1.2)
BUN SERPL-MCNC: 9 MG/DL (ref 8–23)
BUN/CREAT SERPL: 8.9 (ref 7–25)
CALCIUM SPEC-SCNC: 9.4 MG/DL (ref 8.6–10.5)
CHLORIDE SERPL-SCNC: 101 MMOL/L (ref 98–107)
CO2 SERPL-SCNC: 25 MMOL/L (ref 22–29)
CREAT SERPL-MCNC: 1.01 MG/DL (ref 0.76–1.27)
DEPRECATED RDW RBC AUTO: 56 FL (ref 37–54)
EGFRCR SERPLBLD CKD-EPI 2021: 78 ML/MIN/1.73
EOSINOPHIL # BLD AUTO: 0.52 10*3/MM3 (ref 0–0.4)
EOSINOPHIL NFR BLD AUTO: 5.5 % (ref 0.3–6.2)
ERYTHROCYTE [DISTWIDTH] IN BLOOD BY AUTOMATED COUNT: 15.4 % (ref 12.3–15.4)
GLOBULIN UR ELPH-MCNC: 4.3 GM/DL
GLUCOSE SERPL-MCNC: 124 MG/DL (ref 65–99)
HCT VFR BLD AUTO: 33.7 % (ref 37.5–51)
HGB BLD-MCNC: 11.1 G/DL (ref 13–17.7)
IMM GRANULOCYTES # BLD AUTO: 0.02 10*3/MM3 (ref 0–0.05)
IMM GRANULOCYTES NFR BLD AUTO: 0.2 % (ref 0–0.5)
LYMPHOCYTES # BLD AUTO: 1.39 10*3/MM3 (ref 0.7–3.1)
LYMPHOCYTES NFR BLD AUTO: 14.6 % (ref 19.6–45.3)
MCH RBC QN AUTO: 32.1 PG (ref 26.6–33)
MCHC RBC AUTO-ENTMCNC: 32.9 G/DL (ref 31.5–35.7)
MCV RBC AUTO: 97.4 FL (ref 79–97)
MONOCYTES # BLD AUTO: 1.34 10*3/MM3 (ref 0.1–0.9)
MONOCYTES NFR BLD AUTO: 14.1 % (ref 5–12)
NEUTROPHILS NFR BLD AUTO: 6.2 10*3/MM3 (ref 1.7–7)
NEUTROPHILS NFR BLD AUTO: 65.4 % (ref 42.7–76)
PLATELET # BLD AUTO: 261 10*3/MM3 (ref 140–450)
PMV BLD AUTO: 8.2 FL (ref 6–12)
POTASSIUM SERPL-SCNC: 3.9 MMOL/L (ref 3.5–5.2)
PROT SERPL-MCNC: 7.9 G/DL (ref 6–8.5)
RBC # BLD AUTO: 3.46 10*6/MM3 (ref 4.14–5.8)
SODIUM SERPL-SCNC: 136 MMOL/L (ref 136–145)
WBC NRBC COR # BLD AUTO: 9.49 10*3/MM3 (ref 3.4–10.8)

## 2024-02-27 PROCEDURE — 80053 COMPREHEN METABOLIC PANEL: CPT | Performed by: INTERNAL MEDICINE

## 2024-02-27 PROCEDURE — 25010000002 ATEZOLIZUMAB 1200 MG/20ML SOLUTION 20 ML VIAL: Performed by: INTERNAL MEDICINE

## 2024-02-27 PROCEDURE — 25010000002 HEPARIN LOCK FLUSH PER 10 UNITS: Performed by: INTERNAL MEDICINE

## 2024-02-27 PROCEDURE — 85025 COMPLETE CBC W/AUTO DIFF WBC: CPT | Performed by: INTERNAL MEDICINE

## 2024-02-27 PROCEDURE — 25810000003 SODIUM CHLORIDE 0.9 % SOLUTION: Performed by: INTERNAL MEDICINE

## 2024-02-27 PROCEDURE — 96413 CHEMO IV INFUSION 1 HR: CPT

## 2024-02-27 PROCEDURE — 25810000003 SODIUM CHLORIDE 0.9 % SOLUTION 250 ML FLEX CONT: Performed by: INTERNAL MEDICINE

## 2024-02-27 RX ORDER — SODIUM CHLORIDE 9 MG/ML
20 INJECTION, SOLUTION INTRAVENOUS ONCE
Status: CANCELLED | OUTPATIENT
Start: 2024-02-27

## 2024-02-27 RX ORDER — HEPARIN SODIUM (PORCINE) LOCK FLUSH IV SOLN 100 UNIT/ML 100 UNIT/ML
500 SOLUTION INTRAVENOUS AS NEEDED
OUTPATIENT
Start: 2024-02-27

## 2024-02-27 RX ORDER — SODIUM CHLORIDE 9 MG/ML
20 INJECTION, SOLUTION INTRAVENOUS ONCE
Status: COMPLETED | OUTPATIENT
Start: 2024-02-27 | End: 2024-02-27

## 2024-02-27 RX ORDER — HEPARIN SODIUM (PORCINE) LOCK FLUSH IV SOLN 100 UNIT/ML 100 UNIT/ML
500 SOLUTION INTRAVENOUS AS NEEDED
Status: DISCONTINUED | OUTPATIENT
Start: 2024-02-27 | End: 2024-02-28 | Stop reason: HOSPADM

## 2024-02-27 RX ORDER — SODIUM CHLORIDE 0.9 % (FLUSH) 0.9 %
10 SYRINGE (ML) INJECTION AS NEEDED
OUTPATIENT
Start: 2024-02-27

## 2024-02-27 RX ADMIN — SODIUM CHLORIDE 20 ML/HR: 9 INJECTION, SOLUTION INTRAVENOUS at 11:02

## 2024-02-27 RX ADMIN — HEPARIN 500 UNITS: 100 SYRINGE at 12:01

## 2024-02-27 RX ADMIN — ATEZOLIZUMAB 1200 MG: 1200 INJECTION, SOLUTION INTRAVENOUS at 11:22

## 2024-02-27 NOTE — PROGRESS NOTES
Hematology and Oncology [place]  Office number [phone]    Fax number [phone]     Follow up     Date: 24    Patient Name: David Barfield  MRN: [id number]  : 1950    Referring Physician: Dr. Sampson    Chief Complaint: Nonsmall cell lung cancer follow-up on treatment    Cancer Staging:  Cancer Staging   Stage IIIA (cT2b, cN2, cM0)    History of Present Illness: David Barfield is a pleasant 73 y.o. male who presents today for evaluation of NSCLC. He has a 50 pack year smoking history.    He has a history of a PET avid subpleural RLL lung nodule [facility] [place] [facility]. This had an SUV of 9.8 on PET  in association with increased mediastinal LN uptake with SUV around 3. Imaging was felt secondary to osteophyte fibrosis. He did undergo bronchoscopy 2020 with negative cytology. The RLL lung nodule was not felt amenable to bronchoscopy biopsy.    CT lung screen 2023 showed:      PET CT showed mass in the RLL intensely SUV avid, max 17. Mediastinal LN not hypermetabolic above baseline.     Right lower lobe robotic transbronchial biopsy and cytology on 9/15/2023 showed benign findings. Cultures including AFT and fungal were negative.  Station 11L, Station 4L LN negative  Station 7 LN showed NSCLCa (positive for cytokeratin 7, p63, and TTF-1 with no significant staining for p40 or Napsin. The staining pattern raises the possibility of mixed adenocarcinoma and squamous cell carcinoma differentiation in this tumor). PDL1 negative.    Tempus negative for targetable mutations on liquid biopsy. QNS on tissue at diagnosis.    MRI brain was negative.    He has a history of sick sinus syndrome s/p PPM and moderate COPD. He describes only mild physical limitations from this. For example he recently walked 1.5 miles at [facility]. At home, he climbs 17 steps without issue. He does sit down with long activities.     Following neoadjuvant chemoimmunotherapy  he underwent lobectomy and mediastinal lymph node dissection on January 9, 2024.  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  Margins were negative.  Regional lymph nodes including 4R, 12 R, and 7 were negative as were 5 intralobar lymph nodes.    Treatment history:  Carbo, taxol, nivo, C1 10/24/23; C2 11/15/23; C3 11/21/23  Atezolizumab maintenance, cycle 1 2/6/2024    Interval history:   Mr. Barfield is here in the company of his significant other for follow-up and consideration of next cycle of immunotherapy.  He continues to improve.  His cough has resolved as has his right-sided chest wall pain.  He still tires with activities such as standing at the sink to wash dishes, but his activity tolerance is gradually improving.  Denies any constipation, diarrhea, or nausea.  His weight is stable.    Past Medical History:   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    Diabetes mellitus Borderline    Emphysema/COPD     Erectile disorder     GERD (gastroesophageal reflux disease)     History of chemotherapy     Hyperlipidemia     Hypertension 2019    Lung nodule     Mumps     Mumps     Pruritus     after bath    Slow to wake up after anesthesia     Wears dentures     upper only    Wears hearing aid in both ears     usually only wears right   No personal history of myocardial infarction, cerebrovascular event, or venous thromboembolism.  No autoimmune diseases.   No prior cscope, mailed cologuard recently    Past Surgical History:   Past Surgical History:   Procedure Laterality Date    BONE BIOPSY      broken bone surgery in his face    BRONCHOSCOPY THORACOTOMY Right 1/9/2024    Procedure: THORACOTOMY FOR LOWER LOBECTOMY AND MEDISTINAL LYMPH NODE DISSECTION RIGHT;  Surgeon: Jorge  Joey POWELL MD;  Location:  [facility];  Service: Cardiothoracic;  Laterality: Right;    BRONCHOSCOPY WITH ION ROBOTIC ASSIST N/A 09/15/2023    Procedure: BRONCHOSCOPY NAVIGATION WITH ENDOBRONCHIAL ULTRASOUND AND ION ROBOT;  Surgeon: Octaviano Sampson MD;  Location: [facility] END[facility]COPY;  Service: Robotics - Pulmonary;  Laterality: N/A;  ion #[id number] - [id number]  - 0015  Cath guide 0061    EBUS balloon removed and intact    CARDIAC ELECTROPHYSIOLOGY PROCEDURE N/A 08/17/2021    Procedure: Pacemaker DC new;  Surgeon: Kayy Box MD;  Location:  [facility]ATION;  Service: Cardiology;  Laterality: N/A;    FACIAL FRACTURE SURGERY      PACEMAKER IMPLANTATION       Family History:   Family History   Problem Relation Age of Onset    Aneurysm Mother         brain    Dementia Father     Leukemia Sister     Hypertension Paternal Grandfather     Heart disease Paternal Grandmother    Sister leukemia at age 21  No other malignancy    Social History:   Social History     Socioeconomic History    Marital status: Significant Other    Number of children: 3   Tobacco Use    Smoking status: Former     Packs/day: 1.00     Years: 53.00     Additional pack years: 0.00     Total pack years: 53.00     Types: Cigarettes     Start date: 1/1/1968    Smokeless tobacco: Never    Tobacco comments:     Pt states that he quit smoking on 1/8/24. The day before his sx.    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 port,  Disp: , Rfl:     cefdinir (OMNICEF) 300 MG capsule, Take 1 capsule by mouth 2 (Two) Times a Day., Disp: 10 capsule, Rfl: 0    fluticasone (VERAMYST) 27.5 MCG/SPRAY nasal spray, 2 sprays into the nostril(s) as directed by provider 1 (One) Time As Needed for Rhinitis or Allergies., Disp: 6 mL, Rfl: 2    Fluticasone-Umeclidin-Vilant (Trelegy Ellipta) 100-62.5-25 MCG/ACT inhaler, Inhale 1 puff Daily., Disp: 3 each, Rfl: 3    HYDROcodone-acetaminophen (Norco) 7.5-325 MG per tablet, Take 1 tablet by mouth Every 6 (Six) Hours As Needed for Moderate Pain., Disp: 25 tablet, Rfl: 0    lidocaine-prilocaine (EMLA) 2.5-2.5 % cream, Apply 1 application  topically to the appropriate area as directed As Needed (45-60 minutes prior to port access.  Cover with saran/plastic wrap.)., Disp: 30 g, Rfl: 3    lisinopril (PRINIVIL,ZESTRIL) 2.5 MG tablet, Take 1 tablet by mouth As Needed (elevated blood pressure). Take 1/2 tablet po prn (Patient taking differently: Take 1 tablet by mouth As Needed (elevated blood pressure systolic over 140). Take 1/2 tablet po prn), Disp: 30 tablet, Rfl: 1    omeprazole (priLOSEC) 40 MG capsule, Take 1 capsule by mouth Daily., Disp: 30 capsule, Rfl: 5    ondansetron (ZOFRAN) 8 MG tablet, Take 1 tablet by mouth 3 (Three) Times a Day As Needed for Nausea or Vomiting., Disp: 30 tablet, Rfl: 2    pravastatin (PRAVACHOL) 80 MG tablet, Take 1 tablet by mouth Every Night., Disp: 30 tablet, Rfl: 11    prochlorperazine (COMPAZINE) 10 MG tablet, Take 0.5 tablets by mouth Every 6 (Six) Hours As Needed for Nausea., Disp: 30 tablet, Rfl: 1    sildenafil (VIAGRA) 100 MG tablet, Take 0.5 tablets by mouth Daily As Needed for Erectile Dysfunction., Disp: , Rfl:     tamsulosin (FLOMAX) 0.4 MG capsule 24 hr capsule, Take 1 capsule by mouth Daily., Disp: 30 capsule, Rfl: 1    Allergies:   Allergies   Allergen Reactions    Cymbalta [Duloxetine Hcl] GI Intolerance    Gabapentin Mental Status Change     Pt states that this  "medication \"makes him feel foolish in his head\".     Toradol [Ketorolac Tromethamine] GI Intolerance     Projectile vomiting     Latex Other (See Comments)     Latex allergy     Tape Rash       Objective     Vital Signs:   Vitals:    02/27/24 0959   BP: 117/69   Pulse: 82   Temp: 96.8 °F (36 °C)   TempSrc: Temporal   SpO2: 99%   Weight: 82.6 kg (182 lb)   Height: 182.9 cm (72.01\")   PainSc: 0-No pain    Body mass index is 24.68 kg/m².   Pain Score    02/27/24 0959   PainSc: 0-No pain       ECOG Performance Status: 1 - Symptomatic but completely ambulatory    Physical Exam:   General: No acute distress.   HEENT: Normocephalic, atraumatic. Sclera anicteric.   Neck: supple, no adenopathy.   Cardiovascular: RRR  Respiratory: Occasional wheezing, good air movement.  Abdomen: Soft, nontender, non distended with normoactive bowel sounds  Lymph: no cervical, supraclavicular or axillary adenopathy  Neuro: Alert and oriented x 3. No focal deficits.   Ext: Symmetric, no swelling.   Accurate 2/27/24    Laboratory/Imaging Reviewed:   Hospital Outpatient Visit on 02/27/2024   Component Date Value Ref Range Status    Glucose 02/27/2024 124 (H)  65 - 99 mg/dL Final    BUN 02/27/2024 9  8 - 23 mg/dL Final    Creatinine 02/27/2024 1.01  0.76 - 1.27 mg/dL Final    Sodium 02/27/2024 136  136 - 145 mmol/L Final    Potassium 02/27/2024 3.9  3.5 - 5.2 mmol/L Final    Slight hemolysis detected by analyzer. Result may be falsely elevated.    Chloride 02/27/2024 101  98 - 107 mmol/L Final    CO2 02/27/2024 25.0  22.0 - 29.0 mmol/L Final    Calcium 02/27/2024 9.4  8.6 - 10.5 mg/dL Final    Total Protein 02/27/2024 7.9  6.0 - 8.5 g/dL Final    Albumin 02/27/2024 3.6  3.5 - 5.2 g/dL Final    ALT (SGPT) 02/27/2024 14  1 - 41 U/L Final    AST (SGOT) 02/27/2024 17  1 - 40 U/L Final    Alkaline Phosphatase 02/27/2024 93  39 - 117 U/L Final    Total Bilirubin 02/27/2024 0.2  0.0 - 1.2 mg/dL Final    Globulin 02/27/2024 4.3  gm/dL Final    Calculated " Result    A/G Ratio 02/27/2024 0.8  g/dL Final    BUN/Creatinine Ratio 02/27/2024 8.9  7.0 - 25.0 Final    Anion Gap 02/27/2024 10.0  5.0 - 15.0 mmol/L Final    eGFR 02/27/2024 78.0  >60.0 mL/min/1.73 Final    WBC 02/27/2024 9.49  3.40 - 10.80 10*3/mm3 Final    RBC 02/27/2024 3.46 (L)  4.14 - 5.80 10*6/mm3 Final    Hemoglobin 02/27/2024 11.1 (L)  13.0 - 17.7 g/dL Final    Hematocrit 02/27/2024 33.7 (L)  37.5 - 51.0 % Final    MCV 02/27/2024 97.4 (H)  79.0 - 97.0 fL Final    MCH 02/27/2024 32.1  26.6 - 33.0 pg Final    MCHC 02/27/2024 32.9  31.5 - 35.7 g/dL Final    RDW 02/27/2024 15.4  12.3 - 15.4 % Final    RDW-SD 02/27/2024 56.0 (H)  37.0 - 54.0 fl Final    MPV 02/27/2024 8.2  6.0 - 12.0 fL Final    Platelets 02/27/2024 261  140 - 450 10*3/mm3 Final    Neutrophil % 02/27/2024 65.4  42.7 - 76.0 % Final    Lymphocyte % 02/27/2024 14.6 (L)  19.6 - 45.3 % Final    Monocyte % 02/27/2024 14.1 (H)  5.0 - 12.0 % Final    Eosinophil % 02/27/2024 5.5  0.3 - 6.2 % Final    Basophil % 02/27/2024 0.2  0.0 - 1.5 % Final    Immature Grans % 02/27/2024 0.2  0.0 - 0.5 % Final    Neutrophils, Absolute 02/27/2024 6.20  1.70 - 7.00 10*3/mm3 Final    Lymphocytes, Absolute 02/27/2024 1.39  0.70 - 3.10 10*3/mm3 Final    Monocytes, Absolute 02/27/2024 1.34 (H)  0.10 - 0.90 10*3/mm3 Final    Eosinophils, Absolute 02/27/2024 0.52 (H)  0.00 - 0.40 10*3/mm3 Final    Basophils, Absolute 02/27/2024 0.02  0.00 - 0.20 10*3/mm3 Final    Immature Grans, Absolute 02/27/2024 0.02  0.00 - 0.05 10*3/mm3 Final   Hospital Outpatient Visit on 02/21/2024   Component Date Value Ref Range Status    Creatinine 02/21/2024 1.20  0.60 - 1.30 mg/dL Final    Serial Number: [id number]  [id number]   Lab on 02/15/2024   Component Date Value Ref Range Status    Glucose 02/15/2024 110 (H)  65 - 99 mg/dL Final    BUN 02/15/2024 15  8 - 23 mg/dL Final    Creatinine 02/15/2024 0.89  0.76 - 1.27 mg/dL Final    Sodium 02/15/2024 133 (L)  136 - 145 mmol/L Final     Potassium 02/15/2024 3.9  3.5 - 5.2 mmol/L Final    Chloride 02/15/2024 97 (L)  98 - 107 mmol/L Final    CO2 02/15/2024 27.0  22.0 - 29.0 mmol/L Final    Calcium 02/15/2024 9.6  8.6 - 10.5 mg/dL Final    Total Protein 02/15/2024 7.5  6.0 - 8.5 g/dL Final    Albumin 02/15/2024 3.6  3.5 - 5.2 g/dL Final    ALT (SGPT) 02/15/2024 43 (H)  1 - 41 U/L Final    AST (SGOT) 02/15/2024 40  1 - 40 U/L Final    Alkaline Phosphatase 02/15/2024 111  39 - 117 U/L Final    Total Bilirubin 02/15/2024 0.2  0.0 - 1.2 mg/dL Final    Globulin 02/15/2024 3.9  gm/dL Final    Calculated Result    A/G Ratio 02/15/2024 0.9  g/dL Final    BUN/Creatinine Ratio 02/15/2024 16.9  7.0 - 25.0 Final    Anion Gap 02/15/2024 9.0  5.0 - 15.0 mmol/L Final    eGFR 02/15/2024 89.9  >60.0 mL/min/1.73 Final    proBNP 02/15/2024 176.1  0.0 - 900.0 pg/mL Final    WBC 02/15/2024 14.37 (H)  3.40 - 10.80 10*3/mm3 Final    RBC 02/15/2024 3.62 (L)  4.14 - 5.80 10*6/mm3 Final    Hemoglobin 02/15/2024 11.6 (L)  13.0 - 17.7 g/dL Final    Hematocrit 02/15/2024 34.6 (L)  37.5 - 51.0 % Final    MCV 02/15/2024 95.6  79.0 - 97.0 fL Final    MCH 02/15/2024 32.0  26.6 - 33.0 pg Final    MCHC 02/15/2024 33.5  31.5 - 35.7 g/dL Final    RDW 02/15/2024 15.0  12.3 - 15.4 % Final    RDW-SD 02/15/2024 53.1  37.0 - 54.0 fl Final    MPV 02/15/2024 8.1  6.0 - 12.0 fL Final    Platelets 02/15/2024 294  140 - 450 10*3/mm3 Final    Neutrophil % 02/15/2024 73.9  42.7 - 76.0 % Final    Lymphocyte % 02/15/2024 11.6 (L)  19.6 - 45.3 % Final    Monocyte % 02/15/2024 8.9  5.0 - 12.0 % Final    Eosinophil % 02/15/2024 4.7  0.3 - 6.2 % Final    Basophil % 02/15/2024 0.2  0.0 - 1.5 % Final    Immature Grans % 02/15/2024 0.7 (H)  0.0 - 0.5 % Final    Neutrophils, Absolute 02/15/2024 10.63 (H)  1.70 - 7.00 10*3/mm3 Final    Lymphocytes, Absolute 02/15/2024 1.66  0.70 - 3.10 10*3/mm3 Final    Monocytes, Absolute 02/15/2024 1.28 (H)  0.10 - 0.90 10*3/mm3 Final    Eosinophils, Absolute 02/15/2024 0.67  (H)  0.00 - 0.40 10*3/mm3 Final    Basophils, Absolute 02/15/2024 0.03  0.00 - 0.20 10*3/mm3 Final    Immature Grans, Absolute 02/15/2024 0.10 (H)  0.00 - 0.05 10*3/mm3 Final       CT Chest With Contrast Diagnostic    Result Date: 2/22/2024  Narrative: CT CHEST W CONTRAST DIAGNOSTIC, CT ABDOMEN PELVIS W CONTRAST Date of Exam: 2/21/2024 10:53 AM EST Indication: lung cancer restaging. Comparison: CT angiogram chest 2/15/2024, PET/CT 12/27/2023 Technique: Axial CT images were obtained of the chest abdomen and pelvis after the uneventful intravenous administration of 85 cc Isovue-300.  Reconstructed coronal and sagittal images were also obtained. Automated exposure control and iterative construction methods were used. Findings: CT CHEST: MEDIASTINUM: There is 50-69% stenosis involving origin of the left common carotid artery. There is shotty mediastinal adenopathy without single pathologically enlarged lymph node, similar to prior exam. Aortic and heart size are normal. No mass nor pericardial effusion. CORONARY ARTERIES: There is calcified atherosclerotic disease. LUNGS: Improving aeration of the right lung. Posterior right lung postoperative changes are noted. No discrete nodular mass. There is residual peripheral groundglass attenuation with reticular prominence and mild to moderate emphysema. No suspicious nodule nor new consolidation. PLEURAL SPACE: Near complete resolution of previous loculated right apical lateral effusion. There is a persistent moderate right lower effusion with compressive atelectasis. CT ABDOMEN AND PELVIS:  LIVER:  Unremarkable parenchyma without focal lesion. BILIARY/GALLBLADDER:  Unremarkable SPLEEN:  Unremarkable PANCREAS:  Unremarkable ADRENAL:  Unremarkable KIDNEYS:  Unremarkable parenchyma with no solid mass identified. No obstruction.  No calculus identified. GASTROINTESTINAL/MESENTERY:  No evidence of obstruction nor inflammation. AORTA/IVC:  Normal caliber. RETROPERITONEUM/LYMPH  NODES:  Unremarkable REPRODUCTIVE:  Unremarkable BLADDER:  Unremarkable OSSEUS STRUCTURES: Healing right lateral fifth and sixth rib fractures presumably related to surgery. No bony destructive lesion or acute process identified.     Impression: Impression: 1.Right lung postoperative changes without evidence of disease progression. 2.Near complete resolution of right lateral loculated upper thoracic effusion and improving aeration of the periphery of the right lung. 3.Other incidental chronic findings. Electronically Signed: Bertrand Gongora MD  2/22/2024 11:47 AM EST  Workstation ID: [id number]    CT Abdomen Pelvis With Contrast    Result Date: 2/22/2024  Narrative: CT CHEST W CONTRAST DIAGNOSTIC, CT ABDOMEN PELVIS W CONTRAST Date of Exam: 2/21/2024 10:53 AM EST Indication: lung cancer restaging. Comparison: CT angiogram chest 2/15/2024, PET/CT 12/27/2023 Technique: Axial CT images were obtained of the chest abdomen and pelvis after the uneventful intravenous administration of 85 cc Isovue-300.  Reconstructed coronal and sagittal images were also obtained. Automated exposure control and iterative construction methods were used. Findings: CT CHEST: MEDIASTINUM: There is 50-69% stenosis involving origin of the left common carotid artery. There is shotty mediastinal adenopathy without single pathologically enlarged lymph node, similar to prior exam. Aortic and heart size are normal. No mass nor pericardial effusion. CORONARY ARTERIES: There is calcified atherosclerotic disease. LUNGS: Improving aeration of the right lung. Posterior right lung postoperative changes are noted. No discrete nodular mass. There is residual peripheral groundglass attenuation with reticular prominence and mild to moderate emphysema. No suspicious nodule nor new consolidation. PLEURAL SPACE: Near complete resolution of previous loculated right apical lateral effusion. There is a persistent moderate right lower effusion with compressive  atelectasis. CT ABDOMEN AND PELVIS:  LIVER:  Unremarkable parenchyma without focal lesion. BILIARY/GALLBLADDER:  Unremarkable SPLEEN:  Unremarkable PANCREAS:  Unremarkable ADRENAL:  Unremarkable KIDNEYS:  Unremarkable parenchyma with no solid mass identified. No obstruction.  No calculus identified. GASTROINTESTINAL/MESENTERY:  No evidence of obstruction nor inflammation. AORTA/IVC:  Normal caliber. RETROPERITONEUM/LYMPH NODES:  Unremarkable REPRODUCTIVE:  Unremarkable BLADDER:  Unremarkable OSSEUS STRUCTURES: Healing right lateral fifth and sixth rib fractures presumably related to surgery. No bony destructive lesion or acute process identified.     Impression: Impression: 1.Right lung postoperative changes without evidence of disease progression. 2.Near complete resolution of right lateral loculated upper thoracic effusion and improving aeration of the periphery of the right lung. 3.Other incidental chronic findings. Electronically Signed: Bertrand Gongora MD  2/22/2024 11:47 AM EST  Workstation ID: [id number]    CT Angiogram Chest    Result Date: 2/15/2024  Narrative: CT ANGIOGRAM CHEST Date of Exam: 2/15/2024 2:31 PM EST Indication: shortness of breath. Comparison: Chest radiograph 2/10/2024, PET/CT 12/27/2023. Technique: CTA of the chest was performed before and after the uneventful intravenous administration of 85 mL Isovue-370. Reconstructed coronal and sagittal images were also obtained. In addition, a 3-D volume rendered image was created for interpretation. Automated exposure control and iterative reconstruction methods were used. Findings: Pulmonary arteries / cardiovascular: No intraluminal filling defect to suggest pulmonary embolus. The pulmonary arteries are normal in caliber. No pericardial effusion. Normal caliber thoracic aorta. Left chest port. Right chest pacemaker. Coronary artery calcifications. Lymph nodes and mediastinum: Multiple mildly enlarged mediastinal and right hilar lymph nodes, unchanged  in size compared to prior PET/CT. Lungs and airways: Postsurgical changes of right lower lobectomy. Mild centrilobular and paraseptal emphysema. Scattered benign calcified granulomas. Subsegmental atelectasis as well as scattered groundglass attenuation in the residual right lung. Pleura: Small/moderate volume loculated right pleural effusion. No left pleural effusion. No pneumothorax.  Bones and soft tissues: No suspicious osseous abnormality. Healing right lateral fifth and sixth rib fractures, likely related to recent thoracotomy. Postsurgical soft tissue change. Upper abdomen: Visualized portions unremarkable.     Impression: Impression: No evidence of pulmonary embolism. Postsurgical changes of right lower lobectomy with small/moderate volume right pleural effusion, which appears loculated. Scattered areas of groundglass attenuation in the remaining right lung, likely combination of postsurgical change and atelectasis, though a superimposed infectious/inflammatory process is possible. Similar mildly enlarged mediastinal lymph nodes. Healing right lateral fifth and sixth rib fractures, likely related to recent thoracotomy. Electronically Signed: Marco Leahy MD  2/15/2024 4:35 PM EST  Workstation ID: [id number]    XR Chest 2 View    Result Date: 2/10/2024  Narrative: XR CHEST 2 VW Date of Exam: 2/10/2024 3:24 PM EST Indication: shortness of breath, fever Comparison: 2/2/2024 Findings: Left-sided Port-A-Cath and right-sided dual-lead pacemaker are again noted. The heart shadow is normal in size. Pulmonary vasculature is upper limits of normal. There is persistent right basilar pleural scarring and mild parenchymal scarring. A few granulomatous calcifications are also noted. Today's study appears to show a superimposed pattern of mild diffuse peribronchial thickening, increased from prior studies, potentially viral syndrome/bronchitis. This is particularly well seen on the lateral view. No airspace disease or focal  interstitial disease is seen to suggest a developing pneumonia.     Impression: Impression: 1. Stable right basilar pleural scarring and mild chronic appearing changes elsewhere. 2. Diffusely increased peribronchial thickening suspicious for superimposed viral syndrome/bronchitis Electronically Signed: Baljeet Carlos MD  2/10/2024 3:41 PM EST  Workstation ID: [id number]    XR Chest 2 View    Result Date: 2/3/2024  Narrative: XR CHEST 2 VW Date of Exam: 2/2/2024 9:59 AM EST Indication: RIGHT PLEURAL EFFUSION Comparison: 1/22/2024. Findings: Persistent small chronic right-sided pleural effusion and right basilar scarring. Left lung remains clear. No pneumothorax. There is volume loss in the right lung. Heart size and pulmonary vasculature appear within normal limits. Stable right-sided ICD and left-sided chest port.     Impression: Impression: Stable small right-sided pleural effusion and right basilar scarring. Electronically Signed: Raulito Crenshaw MD  2/3/2024 5:26 PM EST  Workstation ID: [id number]     Procedures    Assessment / Plan      Assessment/Plan:     Nonsmall cell lung cancer of the RLL, Stage IIIA  Immunotherapy encounter  -He has an enlarging RLL nodule with SUV of 17. Despite negative transbronchial biopsy, he was found to have N2 disease with a positive level 7 LN. We discussed options for definitive treatment to include induction chemo immunotherapy followed by surgical resection, chemoradiation followed by surgical resection, or definitive chemoradiation. We discussed differences in schedules and side effects. He has no contraindications to immunotherapy and I recommended nivolumab + chemotherapy induction.  His case was reviewed at multidisciplinary tumor board including thoracic surgery.  He was felt to be a good candidate for neoadjuvant chemo immunotherapy followed by resection assessment.  He completed 3 cycles of  nivolumab, carboplatin, paclitaxel x3 cycles in a neoadjuvant fashion.  He underwent  right lower lobectomy and lymph node dissection.  -Final pathology reviewed and shows 1.4 cm of residual disease with extensive treatment effect.  The previously biopsied lymph node was negative on mediastinal dissection.  -His case was reviewed at multidisciplinary tumor board and consensus was to proceed with adjuvant immunotherapy without any indication for further chemotherapy or adjuvant radiation. Will proceed with Atezolizumab per the [id number] regimen.  -Because his original bronchoscopy specimen was QNS for Tempus tissue testing, I ordered repeat Tempus on the resection specimen to rule out any EGFR or ALK mutation.  There was no mutation on liquid biopsy.  -Cbc/Cmp Labs are reviewed and adequate for cycle 2.  Immunotherapy orders signed.    4.  Rib fracture  5.  Pain secondary #4  -Continue to use Lortab as needed.    -Reports this is substantially improving.    6.  Weight loss  7.  Low appetite/food aversion  -Encouraged him to schedule Zofran or Compazine prior to meals and utilize nutrition supplements as indicated.   -Weight is stable.    Follow-up in 3 weeks for next treatment     Neetu Ashley MD  Hematology and Oncology

## 2024-02-29 ENCOUNTER — TELEPHONE (OUTPATIENT)
Dept: ONCOLOGY | Facility: CLINIC | Age: 74
End: 2024-02-29
Payer: MEDICARE

## 2024-02-29 NOTE — TELEPHONE ENCOUNTER
Patient's wife called patient had immunotherapy Tuesday, and he is vomiting,chills and is not thinking clearly like example instead of get a trash can and vomiting he is just doing it in his lap. She wants to know if he needs to be seen today?

## 2024-02-29 NOTE — TELEPHONE ENCOUNTER
RN contacted Balaji back.  Advised her per Dr. Ashley that since patient is also having a hard time with thinking/cognition, he needs to be taken to the nearest ER for evaluation to rule out other possibilities.  Balaji verbalized understanding.

## 2024-02-29 NOTE — TELEPHONE ENCOUNTER
"RN called Balaji back. Patient got Tecentriq on Tuesday.     She said that the patient woke up vomiting at 4 am and \"chilling really bad.\" Denies fever. States that he \"can't think right\" and vomited in his lap whenever she told him to grab a trash can. Patient took compazine but Balaji is not sure if he kept it down or not since he was vomiting. RN advised Balaji that he can take the compazine every 6 hours and also has zofran that he can alternate for nausea.     Balaji also thinks that his \"pneumonia is back\" because the patient is coughing up phlegm. Requesting a flu & covid test and CT scan to check. States that the chest XR did not find the pneumonia last time but the CT scan did. RN will discuss with Dr. Ashley and call Balaji back.   "

## 2024-03-07 ENCOUNTER — HOSPITAL ENCOUNTER (OUTPATIENT)
Dept: MRI IMAGING | Facility: HOSPITAL | Age: 74
Discharge: HOME | End: 2024-03-07
Admitting: INTERNAL MEDICINE
Payer: MEDICARE

## 2024-03-07 DIAGNOSIS — R20.2 NUMBNESS AND TINGLING IN RIGHT HAND: ICD-10-CM

## 2024-03-07 DIAGNOSIS — R20.0 NUMBNESS AND TINGLING IN RIGHT HAND: ICD-10-CM

## 2024-03-07 DIAGNOSIS — C34.31 MALIGNANT NEOPLASM OF LOWER LOBE OF RIGHT LUNG: ICD-10-CM

## 2024-03-07 PROCEDURE — 25010000002 HEPARIN LOCK FLUSH PER 10 UNITS

## 2024-03-07 PROCEDURE — A9577 INJ MULTIHANCE: HCPCS | Performed by: INTERNAL MEDICINE

## 2024-03-07 PROCEDURE — 70553 MRI BRAIN STEM W/O & W/DYE: CPT

## 2024-03-07 PROCEDURE — 0 GADOBENATE DIMEGLUMINE 529 MG/ML SOLUTION: Performed by: INTERNAL MEDICINE

## 2024-03-07 RX ORDER — HEPARIN SODIUM (PORCINE) LOCK FLUSH IV SOLN 100 UNIT/ML 100 UNIT/ML
SOLUTION INTRAVENOUS
Status: COMPLETED
Start: 2024-03-07 | End: 2024-03-07

## 2024-03-07 RX ORDER — HEPARIN SODIUM (PORCINE) LOCK FLUSH IV SOLN 100 UNIT/ML 100 UNIT/ML
500 SOLUTION INTRAVENOUS AS NEEDED
Status: DISCONTINUED | OUTPATIENT
Start: 2024-03-07 | End: 2024-03-08 | Stop reason: HOSPADM

## 2024-03-07 RX ADMIN — HEPARIN 500 UNITS: 100 SYRINGE at 10:21

## 2024-03-07 RX ADMIN — GADOBENATE DIMEGLUMINE 17 ML: 529 INJECTION, SOLUTION INTRAVENOUS at 10:09

## 2024-03-07 RX ADMIN — HEPARIN SODIUM (PORCINE) LOCK FLUSH IV SOLN 100 UNIT/ML 500 UNITS: 100 SOLUTION at 10:21

## 2024-03-08 ENCOUNTER — TELEPHONE (OUTPATIENT)
Dept: ONCOLOGY | Facility: CLINIC | Age: 74
End: 2024-03-08
Payer: MEDICARE

## 2024-03-08 NOTE — TELEPHONE ENCOUNTER
Notified patient and his significant other per Dr. Ashley regarding MRI Brain: Please notify patient of normal result/ no clinically significant results. Patient verbalized understanding.

## 2024-03-12 DIAGNOSIS — C34.31 MALIGNANT NEOPLASM OF LOWER LOBE OF RIGHT LUNG: Primary | ICD-10-CM

## 2024-03-20 ENCOUNTER — HOSPITAL ENCOUNTER (OUTPATIENT)
Dept: ONCOLOGY | Facility: HOSPITAL | Age: 74
Discharge: HOME | End: 2024-03-20
Admitting: INTERNAL MEDICINE
Payer: MEDICARE

## 2024-03-20 ENCOUNTER — OFFICE VISIT (OUTPATIENT)
Dept: ONCOLOGY | Facility: CLINIC | Age: 74
End: 2024-03-20
Payer: MEDICARE

## 2024-03-20 ENCOUNTER — APPOINTMENT (OUTPATIENT)
Dept: ONCOLOGY | Facility: HOSPITAL | Age: 74
End: 2024-03-20
Payer: MEDICARE

## 2024-03-20 VITALS
DIASTOLIC BLOOD PRESSURE: 79 MMHG | TEMPERATURE: 97.6 F | HEIGHT: 72 IN | SYSTOLIC BLOOD PRESSURE: 134 MMHG | BODY MASS INDEX: 24.38 KG/M2 | WEIGHT: 180 LBS | OXYGEN SATURATION: 97 % | HEART RATE: 70 BPM

## 2024-03-20 DIAGNOSIS — C34.31 MALIGNANT NEOPLASM OF LOWER LOBE OF RIGHT LUNG: Primary | ICD-10-CM

## 2024-03-20 LAB
ALBUMIN SERPL-MCNC: 3.6 G/DL (ref 3.5–5.2)
ALBUMIN/GLOB SERPL: 0.9 G/DL
ALP SERPL-CCNC: 97 U/L (ref 39–117)
ALT SERPL W P-5'-P-CCNC: 16 U/L (ref 1–41)
ANION GAP SERPL CALCULATED.3IONS-SCNC: 9 MMOL/L (ref 5–15)
AST SERPL-CCNC: 15 U/L (ref 1–40)
BASOPHILS # BLD AUTO: 0.02 10*3/MM3 (ref 0–0.2)
BASOPHILS NFR BLD AUTO: 0.2 % (ref 0–1.5)
BILIRUB SERPL-MCNC: 0.2 MG/DL (ref 0–1.2)
BUN SERPL-MCNC: 10 MG/DL (ref 8–23)
BUN/CREAT SERPL: 9.3 (ref 7–25)
CALCIUM SPEC-SCNC: 9.5 MG/DL (ref 8.6–10.5)
CHLORIDE SERPL-SCNC: 100 MMOL/L (ref 98–107)
CO2 SERPL-SCNC: 25 MMOL/L (ref 22–29)
CORTIS SERPL-MCNC: 6.56 MCG/DL
CREAT SERPL-MCNC: 1.08 MG/DL (ref 0.76–1.27)
DEPRECATED RDW RBC AUTO: 54.7 FL (ref 37–54)
EGFRCR SERPLBLD CKD-EPI 2021: 71.6 ML/MIN/1.73
EOSINOPHIL # BLD AUTO: 0.21 10*3/MM3 (ref 0–0.4)
EOSINOPHIL NFR BLD AUTO: 2.3 % (ref 0.3–6.2)
ERYTHROCYTE [DISTWIDTH] IN BLOOD BY AUTOMATED COUNT: 14.8 % (ref 12.3–15.4)
GLOBULIN UR ELPH-MCNC: 3.9 GM/DL
GLUCOSE SERPL-MCNC: 97 MG/DL (ref 65–99)
HCT VFR BLD AUTO: 33.9 % (ref 37.5–51)
HGB BLD-MCNC: 10.9 G/DL (ref 13–17.7)
IMM GRANULOCYTES # BLD AUTO: 0.01 10*3/MM3 (ref 0–0.05)
IMM GRANULOCYTES NFR BLD AUTO: 0.1 % (ref 0–0.5)
LYMPHOCYTES # BLD AUTO: 1.69 10*3/MM3 (ref 0.7–3.1)
LYMPHOCYTES NFR BLD AUTO: 18.3 % (ref 19.6–45.3)
MCH RBC QN AUTO: 31.6 PG (ref 26.6–33)
MCHC RBC AUTO-ENTMCNC: 32.2 G/DL (ref 31.5–35.7)
MCV RBC AUTO: 98.3 FL (ref 79–97)
MONOCYTES # BLD AUTO: 1.03 10*3/MM3 (ref 0.1–0.9)
MONOCYTES NFR BLD AUTO: 11.2 % (ref 5–12)
NEUTROPHILS NFR BLD AUTO: 6.27 10*3/MM3 (ref 1.7–7)
NEUTROPHILS NFR BLD AUTO: 67.9 % (ref 42.7–76)
PLATELET # BLD AUTO: 227 10*3/MM3 (ref 140–450)
PMV BLD AUTO: 8.4 FL (ref 6–12)
POTASSIUM SERPL-SCNC: 3.7 MMOL/L (ref 3.5–5.2)
PROT SERPL-MCNC: 7.5 G/DL (ref 6–8.5)
RBC # BLD AUTO: 3.45 10*6/MM3 (ref 4.14–5.8)
SODIUM SERPL-SCNC: 134 MMOL/L (ref 136–145)
T4 FREE SERPL-MCNC: 0.94 NG/DL (ref 0.92–1.68)
TSH SERPL DL<=0.05 MIU/L-ACNC: 1.36 UIU/ML (ref 0.27–4.2)
WBC NRBC COR # BLD AUTO: 9.23 10*3/MM3 (ref 3.4–10.8)

## 2024-03-20 PROCEDURE — 25010000002 HEPARIN LOCK FLUSH PER 10 UNITS: Performed by: INTERNAL MEDICINE

## 2024-03-20 PROCEDURE — 36591 DRAW BLOOD OFF VENOUS DEVICE: CPT

## 2024-03-20 PROCEDURE — 85025 COMPLETE CBC W/AUTO DIFF WBC: CPT | Performed by: INTERNAL MEDICINE

## 2024-03-20 PROCEDURE — 82533 TOTAL CORTISOL: CPT | Performed by: INTERNAL MEDICINE

## 2024-03-20 PROCEDURE — 84443 ASSAY THYROID STIM HORMONE: CPT | Performed by: INTERNAL MEDICINE

## 2024-03-20 PROCEDURE — 80053 COMPREHEN METABOLIC PANEL: CPT | Performed by: INTERNAL MEDICINE

## 2024-03-20 PROCEDURE — 84439 ASSAY OF FREE THYROXINE: CPT | Performed by: INTERNAL MEDICINE

## 2024-03-20 RX ORDER — SODIUM CHLORIDE 0.9 % (FLUSH) 0.9 %
10 SYRINGE (ML) INJECTION AS NEEDED
Status: DISCONTINUED | OUTPATIENT
Start: 2024-03-20 | End: 2024-03-21 | Stop reason: HOSPADM

## 2024-03-20 RX ORDER — HEPARIN SODIUM (PORCINE) LOCK FLUSH IV SOLN 100 UNIT/ML 100 UNIT/ML
500 SOLUTION INTRAVENOUS AS NEEDED
OUTPATIENT
Start: 2024-03-20

## 2024-03-20 RX ORDER — SODIUM CHLORIDE 0.9 % (FLUSH) 0.9 %
10 SYRINGE (ML) INJECTION AS NEEDED
OUTPATIENT
Start: 2024-03-20

## 2024-03-20 RX ORDER — MIRTAZAPINE 15 MG/1
15 TABLET, FILM COATED ORAL NIGHTLY
Qty: 30 TABLET | Refills: 0 | Status: SHIPPED | OUTPATIENT
Start: 2024-03-20

## 2024-03-20 RX ORDER — HEPARIN SODIUM (PORCINE) LOCK FLUSH IV SOLN 100 UNIT/ML 100 UNIT/ML
500 SOLUTION INTRAVENOUS AS NEEDED
Status: DISCONTINUED | OUTPATIENT
Start: 2024-03-20 | End: 2024-03-21 | Stop reason: HOSPADM

## 2024-03-20 RX ADMIN — HEPARIN 500 UNITS: 100 SYRINGE at 13:50

## 2024-03-20 RX ADMIN — Medication 10 ML: at 13:50

## 2024-03-20 NOTE — PATIENT INSTRUCTIONS
Nausea:  Try scheduling zofran (with a laxative if needed) during the day and using compazine only at night as needed.

## 2024-03-20 NOTE — PROGRESS NOTES
Hematology and Oncology [facility]  Office number [phone]    Fax number [phone]     Follow up     Date: 24    Patient Name: David Barfield  MRN: [id number]  : 1950    Referring Physician: Dr. Sampson    Chief Complaint: Nonsmall cell lung cancer follow-up on treatment    Cancer Staging:  Cancer Staging   Stage IIIA (cT2b, cN2, cM0)    History of Present Illness: David Barfield is a pleasant 74 y.o. male who presents today for evaluation of NSCLC. He has a 50 pack year smoking history.    He has a history of a PET avid subpleural RLL lung nod[facility] [place] [facility]. This had an SUV of 9.8 on PET  in association with increased mediastinal LN uptake with SUV around 3. Imaging was felt secondary to osteophyte fibrosis. He did undergo bronchoscopy 2020 with negative cytology. The RLL lung nodule was not felt amenable to bronchoscopy biopsy.    CT lung screen 2023 showed:      PET CT showed mass in the RLL intensely SUV avid, max 17. Mediastinal LN not hypermetabolic above baseline.     Right lower lobe robotic transbronchial biopsy and cytology on 9/15/2023 showed benign findings. Cultures including AFT and fungal were negative.  Station 11L, Station 4L LN negative  Station 7 LN showed NSCLCa (positive for cytokeratin 7, p63, and TTF-1 with no significant staining for p40 or Napsin. The staining pattern raises the possibility of mixed adenocarcinoma and squamous cell carcinoma differentiation in this tumor). PDL1 negative.    Tempus negative for targetable mutations on liquid biopsy. QNS on tissue at diagnosis.    MRI brain was negative.    He has a history of sick sinus syndrome s/p PPM and moderate COPD. He describes only mild physical limitations from this. For example he recently walked 1.5 miles at [employer] [facility]. At home, he climbs 17 steps without issue. He does sit down with long activities.     Following neoadjuvant chemoimmunotherapy  he underwent lobectomy and mediastinal lymph node dissection on January 9, 2024.  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  Margins were negative.  Regional lymph nodes including 4R, 12 R, and 7 were negative as were 5 intralobar lymph nodes.    Treatment history:  Carbo, taxol, nivo, C1 10/24/23; C2 11/15/23; C3 11/21/23  Atezolizumab maintenance, cycle 1 2/6/2024; cycle 2 2/27/24    Interval history:   Mr. Barfield is here in the company of his significant other for follow-up.  He is not feeling well.  He reports that since his last immunotherapy infusion he had persistent fatigue.  Low appetite, nausea.  He was not medicating with Zofran because it causes too much constipation but was taking Compazine 3 times daily.  He has stable orthostatic hypotension symptoms on rising which he reports are longstanding, but now symptoms experiences weakness in his thighs when that occurs as well.  No headaches.  Pain in his right chest wall is stable.  He reports good fluid intake, but not eating well.  His weight is down a total of 10 pounds in the last 6 weeks.    He and his significant other both feel that he was tolerating the nivolumab with his neoadjuvant treatment much better than atezolizumab and would like to consider changing to nivolumab.    They have questions about his persistent anemia and whether this could represent leukemia his white blood count was elevated last month in the context of pneumonia.    Past Medical History:   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    Diabetes mellitus Borderline    Emphysema/COPD     Erectile disorder     GERD (gastroesophageal reflux disease)     History of chemotherapy     Hyperlipidemia     Hypertension 2019    Lung nodule     Mumps      Mumps     Pruritus     after bath    Slow to wake up after anesthesia     Wears dentures     upper only    Wears hearing aid in both ears     usually only wears right   No personal history of myocardial infarction, cerebrovascular event, or venous thromboembolism.  No autoimmune diseases.   No prior cscope, mailed cologuard recently    Past Surgical History:   Past Surgical History:   Procedure Laterality Date    BONE BIOPSY      broken bone surgery in his face    BRONCHOSCOPY THORACOTOMY Right 1/9/2024    Procedure: THORACOTOMY FOR LOWER LOBECTOMY AND MEDISTINAL LYMPH NODE DISSECTION RIGHT;  Surgeon: Joey Patel MD;  Location:  [facility] OR;  Service: Cardiothoracic;  Laterality: Right;    BRONCHOSCOPY WITH ION ROBOTIC ASSIST N/A 09/15/2023    Procedure: BRONCHOSCOPY NAVIGATION WITH ENDOBRONCHIAL ULTRASOUND AND ION ROBOT;  Surgeon: Octaviano Sampson MD;  Location:  [facility] ENDOSCOPY;  Service: Robotics - Pulmonary;  Laterality: N/A;  ion #6 - 0032  - 0015  Cath guide 0061    EBUS balloon removed and intact    CARDIAC ELECTROPHYSIOLOGY PROCEDURE N/A 08/17/2021    Procedure: Pacemaker DC new;  Surgeon: Kayy Box MD;  Location:  [facility] CATH INVASIVE LOCATION;  Service: Cardiology;  Laterality: N/A;    FACIAL FRACTURE SURGERY      PACEMAKER IMPLANTATION       Family History:   Family History   Problem Relation Age of Onset    Aneurysm Mother         brain    Dementia Father     Leukemia Sister     Hypertension Paternal Grandfather     Heart disease Paternal Grandmother    Sister leukemia at age 21  No other malignancy    Social History:   Social History     Socioeconomic History    Marital status: Significant Other    Number of children: 3   Tobacco Use    Smoking status: Former     Current packs/day: 1.00     Average packs/day: 1 pack/day for 56.2 years (56.2 ttl pk-yrs)     Types: Cigarettes     Start date: 1/1/1968    Smokeless tobacco: Never    Tobacco comments:     Pt states that he quit  smoking on 1/8/24. The day before his sx.    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 port, Disp: , Rfl:     cefdinir (OMNICEF) 300 MG capsule, Take 1 capsule by mouth 2 (Two) Times a Day., Disp: 10 capsule, Rfl: 0    fluticasone (VERAMYST) 27.5 MCG/SPRAY nasal spray, 2 sprays into the nostril(s) as directed by provider 1 (One) Time As Needed for Rhinitis or Allergies., Disp: 6 mL, Rfl: 2    Fluticasone-Umeclidin-Vilant (Trelegy Ellipta) 100-62.5-25 MCG/ACT inhaler, Inhale 1 puff Daily., Disp: 3 each, Rfl: 3    HYDROcodone-acetaminophen (Norco) 7.5-325 MG per tablet, Take 1 tablet by mouth Every 6 (Six) Hours As Needed for Moderate Pain., Disp: 25 tablet, Rfl: 0    lidocaine-prilocaine (EMLA) 2.5-2.5 % cream, Apply 1 application  topically to the appropriate area as directed As Needed (45-60 minutes prior to port access.  Cover with saran/plastic wrap.)., Disp: 30 g, Rfl: 3    lisinopril (PRINIVIL,ZESTRIL) 2.5 MG tablet, Take 1 tablet by mouth As Needed (elevated blood pressure). Take 1/2 tablet po prn (Patient taking differently: Take 1 tablet by mouth As Needed (elevated blood pressure systolic over 140). Take 1/2 tablet po prn), Disp: 30 tablet, Rfl: 1    omeprazole (priLOSEC) 40 MG capsule, Take 1 capsule by mouth Daily., Disp: 30 capsule, Rfl: 5    ondansetron (ZOFRAN) 8 MG tablet, Take 1 tablet by mouth 3 (Three) Times a Day As Needed for Nausea or Vomiting., Disp: 30 tablet, Rfl: 2     "pravastatin (PRAVACHOL) 80 MG tablet, Take 1 tablet by mouth Every Night., Disp: 30 tablet, Rfl: 11    prochlorperazine (COMPAZINE) 10 MG tablet, Take 0.5 tablets by mouth Every 6 (Six) Hours As Needed for Nausea., Disp: 30 tablet, Rfl: 1    sildenafil (VIAGRA) 100 MG tablet, Take 0.5 tablets by mouth Daily As Needed for Erectile Dysfunction., Disp: , Rfl:     tamsulosin (FLOMAX) 0.4 MG capsule 24 hr capsule, Take 1 capsule by mouth Daily., Disp: 30 capsule, Rfl: 1    Allergies:   Allergies   Allergen Reactions    Cymbalta [Duloxetine Hcl] GI Intolerance    Gabapentin Mental Status Change     Pt states that this medication \"makes him feel foolish in his head\".     Toradol [Ketorolac Tromethamine] GI Intolerance     Projectile vomiting     Latex Other (See Comments)     Latex allergy     Tape Rash       Objective     Vital Signs:   Vitals:    03/20/24 1254   BP: 134/79   Pulse: 70   Temp: 97.6 °F (36.4 °C)   TempSrc: Temporal   SpO2: 97%   Weight: 81.6 kg (180 lb)   Height: 182.9 cm (72.01\")   PainSc: 0-No pain    Body mass index is 24.41 kg/m².   Pain Score    03/20/24 1254   PainSc: 0-No pain       ECOG Performance Status: 1 - Symptomatic but completely ambulatory    Physical Exam:   General: No acute distress.   HEENT: Normocephalic, atraumatic. Sclera anicteric.   Neck: supple, no adenopathy.   Cardiovascular: RRR  Respiratory: Clear to auscultation bilaterally.  Abdomen: Soft, nontender, non distended with normoactive bowel sounds  Lymph: no cervical, supraclavicular or axillary adenopathy  Neuro: Alert and oriented x 3. No focal deficits.   Ext: Symmetric, no swelling.   Accurate as of 3/20/2024    Laboratory/Imaging Reviewed:   Hospital Outpatient Visit on 03/20/2024   Component Date Value Ref Range Status    Glucose 03/20/2024 97  65 - 99 mg/dL Final    BUN 03/20/2024 10  8 - 23 mg/dL Final    Creatinine 03/20/2024 1.08  0.76 - 1.27 mg/dL Final    Sodium 03/20/2024 134 (L)  136 - 145 mmol/L Final    Potassium " 03/20/2024 3.7  3.5 - 5.2 mmol/L Final    Chloride 03/20/2024 100  98 - 107 mmol/L Final    CO2 03/20/2024 25.0  22.0 - 29.0 mmol/L Final    Calcium 03/20/2024 9.5  8.6 - 10.5 mg/dL Final    Total Protein 03/20/2024 7.5  6.0 - 8.5 g/dL Final    Albumin 03/20/2024 3.6  3.5 - 5.2 g/dL Final    ALT (SGPT) 03/20/2024 16  1 - 41 U/L Final    AST (SGOT) 03/20/2024 15  1 - 40 U/L Final    Alkaline Phosphatase 03/20/2024 97  39 - 117 U/L Final    Total Bilirubin 03/20/2024 0.2  0.0 - 1.2 mg/dL Final    Globulin 03/20/2024 3.9  gm/dL Final    Calculated Result    A/G Ratio 03/20/2024 0.9  g/dL Final    BUN/Creatinine Ratio 03/20/2024 9.3  7.0 - 25.0 Final    Anion Gap 03/20/2024 9.0  5.0 - 15.0 mmol/L Final    eGFR 03/20/2024 71.6  >60.0 mL/min/1.73 Final    TSH 03/20/2024 1.360  0.270 - 4.200 uIU/mL Final    Free T4 03/20/2024 0.94  0.92 - 1.68 ng/dL Final    WBC 03/20/2024 9.23  3.40 - 10.80 10*3/mm3 Final    RBC 03/20/2024 3.45 (L)  4.14 - 5.80 10*6/mm3 Final    Hemoglobin 03/20/2024 10.9 (L)  13.0 - 17.7 g/dL Final    Hematocrit 03/20/2024 33.9 (L)  37.5 - 51.0 % Final    MCV 03/20/2024 98.3 (H)  79.0 - 97.0 fL Final    MCH 03/20/2024 31.6  26.6 - 33.0 pg Final    MCHC 03/20/2024 32.2  31.5 - 35.7 g/dL Final    RDW 03/20/2024 14.8  12.3 - 15.4 % Final    RDW-SD 03/20/2024 54.7 (H)  37.0 - 54.0 fl Final    MPV 03/20/2024 8.4  6.0 - 12.0 fL Final    Platelets 03/20/2024 227  140 - 450 10*3/mm3 Final    Neutrophil % 03/20/2024 67.9  42.7 - 76.0 % Final    Lymphocyte % 03/20/2024 18.3 (L)  19.6 - 45.3 % Final    Monocyte % 03/20/2024 11.2  5.0 - 12.0 % Final    Eosinophil % 03/20/2024 2.3  0.3 - 6.2 % Final    Basophil % 03/20/2024 0.2  0.0 - 1.5 % Final    Immature Grans % 03/20/2024 0.1  0.0 - 0.5 % Final    Neutrophils, Absolute 03/20/2024 6.27  1.70 - 7.00 10*3/mm3 Final    Lymphocytes, Absolute 03/20/2024 1.69  0.70 - 3.10 10*3/mm3 Final    Monocytes, Absolute 03/20/2024 1.03 (H)  0.10 - 0.90 10*3/mm3 Final     Eosinophils, Absolute 03/20/2024 0.21  0.00 - 0.40 10*3/mm3 Final    Basophils, Absolute 03/20/2024 0.02  0.00 - 0.20 10*3/mm3 Final    Immature Grans, Absolute 03/20/2024 0.01  0.00 - 0.05 10*3/mm3 Final    Cortisol 03/20/2024 6.56    mcg/dL Final       MRI Brain With & Without Contrast    Result Date: 3/7/2024  Narrative: MRI BRAIN W WO CONTRAST Date of Exam: 3/7/2024 9:19 AM EST Indication: lung cancer staging.  Comparison: 9/28/2023. Technique:  Routine multiplanar/multisequence sequence images of the brain were obtained before and after the uneventful administration of 17 mL Multihance. Findings: No acute infarct is present on diffusion weighted sequences. Midline structures are normal and the craniocervical junction appears satisfactory. Age-related changes are present with moderate generalized volume loss and typical T2 hyperintense subcortical  and pontine white matter changes, nonspecific but favored to reflect sequela of chronic microvascular ischemia. There is otherwise no evidence of intracranial hemorrhage, mass or mass effect. The ventricles demonstrate mild expected ex vacuo prominence.  The orbits are normal. The paranasal sinuses appear clear. Intracranial arterial flow voids are maintained. There is no abnormal brain parenchymal enhancement.     Impression: Impression: No evidence of intracranial metastasis. Moderate typical chronic and age-related changes are noted as above. Electronically Signed: Alexandre Matos MD  3/7/2024 11:04 AM EST  Workstation ID: [id number]    CT Chest With Contrast Diagnostic    Result Date: 2/22/2024  Narrative: CT CHEST W CONTRAST DIAGNOSTIC, CT ABDOMEN PELVIS W CONTRAST Date of Exam: 2/21/2024 10:53 AM EST Indication: lung cancer restaging. Comparison: CT angiogram chest 2/15/2024, PET/CT 12/27/2023 Technique: Axial CT images were obtained of the chest abdomen and pelvis after the uneventful intravenous administration of 85 cc Isovue-300.  Reconstructed coronal and  sagittal images were also obtained. Automated exposure control and iterative construction methods were used. Findings: CT CHEST: MEDIASTINUM: There is 50-69% stenosis involving origin of the left common carotid artery. There is shotty mediastinal adenopathy without single pathologically enlarged lymph node, similar to prior exam. Aortic and heart size are normal. No mass nor pericardial effusion. CORONARY ARTERIES: There is calcified atherosclerotic disease. LUNGS: Improving aeration of the right lung. Posterior right lung postoperative changes are noted. No discrete nodular mass. There is residual peripheral groundglass attenuation with reticular prominence and mild to moderate emphysema. No suspicious nodule nor new consolidation. PLEURAL SPACE: Near complete resolution of previous loculated right apical lateral effusion. There is a persistent moderate right lower effusion with compressive atelectasis. CT ABDOMEN AND PELVIS:  LIVER:  Unremarkable parenchyma without focal lesion. BILIARY/GALLBLADDER:  Unremarkable SPLEEN:  Unremarkable PANCREAS:  Unremarkable ADRENAL:  Unremarkable KIDNEYS:  Unremarkable parenchyma with no solid mass identified. No obstruction.  No calculus identified. GASTROINTESTINAL/MESENTERY:  No evidence of obstruction nor inflammation. AORTA/IVC:  Normal caliber. RETROPERITONEUM/LYMPH NODES:  Unremarkable REPRODUCTIVE:  Unremarkable BLADDER:  Unremarkable OSSEUS STRUCTURES: Healing right lateral fifth and sixth rib fractures presumably related to surgery. No bony destructive lesion or acute process identified.     Impression: Impression: 1.Right lung postoperative changes without evidence of disease progression. 2.Near complete resolution of right lateral loculated upper thoracic effusion and improving aeration of the periphery of the right lung. 3.Other incidental chronic findings. Electronically Signed: Bertrand Gongora MD  2/22/2024 11:47 AM EST  Workstation ID: [id number]    CT Abdomen Pelvis  With Contrast    Result Date: 2/22/2024  Narrative: CT CHEST W CONTRAST DIAGNOSTIC, CT ABDOMEN PELVIS W CONTRAST Date of Exam: 2/21/2024 10:53 AM EST Indication: lung cancer restaging. Comparison: CT angiogram chest 2/15/2024, PET/CT 12/27/2023 Technique: Axial CT images were obtained of the chest abdomen and pelvis after the uneventful intravenous administration of 85 cc Isovue-300.  Reconstructed coronal and sagittal images were also obtained. Automated exposure control and iterative construction methods were used. Findings: CT CHEST: MEDIASTINUM: There is 50-69% stenosis involving origin of the left common carotid artery. There is shotty mediastinal adenopathy without single pathologically enlarged lymph node, similar to prior exam. Aortic and heart size are normal. No mass nor pericardial effusion. CORONARY ARTERIES: There is calcified atherosclerotic disease. LUNGS: Improving aeration of the right lung. Posterior right lung postoperative changes are noted. No discrete nodular mass. There is residual peripheral groundglass attenuation with reticular prominence and mild to moderate emphysema. No suspicious nodule nor new consolidation. PLEURAL SPACE: Near complete resolution of previous loculated right apical lateral effusion. There is a persistent moderate right lower effusion with compressive atelectasis. CT ABDOMEN AND PELVIS:  LIVER:  Unremarkable parenchyma without focal lesion. BILIARY/GALLBLADDER:  Unremarkable SPLEEN:  Unremarkable PANCREAS:  Unremarkable ADRENAL:  Unremarkable KIDNEYS:  Unremarkable parenchyma with no solid mass identified. No obstruction.  No calculus identified. GASTROINTESTINAL/MESENTERY:  No evidence of obstruction nor inflammation. AORTA/IVC:  Normal caliber. RETROPERITONEUM/LYMPH NODES:  Unremarkable REPRODUCTIVE:  Unremarkable BLADDER:  Unremarkable OSSEUS STRUCTURES: Healing right lateral fifth and sixth rib fractures presumably related to surgery. No bony destructive lesion or  acute process identified.     Impression: Impression: 1.Right lung postoperative changes without evidence of disease progression. 2.Near complete resolution of right lateral loculated upper thoracic effusion and improving aeration of the periphery of the right lung. 3.Other incidental chronic findings. Electronically Signed: Bertrand Gongora MD  2/22/2024 11:47 AM EST  Workstation ID: [id number]     Procedures    Assessment / Plan      Assessment/Plan:     Nonsmall cell lung cancer of the RLL, Stage IIIA  Immunotherapy encounter  -He has an enlarging RLL nodule with SUV of 17. Despite negative transbronchial biopsy, he was found to have N2 disease with a positive level 7 LN. We discussed options for definitive treatment to include induction chemo immunotherapy followed by surgical resection, chemoradiation followed by surgical resection, or definitive chemoradiation. We discussed differences in schedules and side effects. He has no contraindications to immunotherapy and I recommended nivolumab + chemotherapy induction.  His case was reviewed at multidisciplinary tumor board including thoracic surgery.  He was felt to be a good candidate for neoadjuvant chemo immunotherapy followed by resection assessment.  He completed 3 cycles of  nivolumab, carboplatin, paclitaxel x3 cycles in a neoadjuvant fashion.  He underwent right lower lobectomy and lymph node dissection.  -Final pathology reviewed and shows 1.4 cm of residual disease with extensive treatment effect.  The previously biopsied lymph node was negative on mediastinal dissection.  -His case was reviewed at multidisciplinary tumor board and consensus was to proceed with adjuvant immunotherapy without any indication for further chemotherapy or adjuvant radiation.  We proceeded with atezolizumab per the [id number] regimen. Because his original bronchoscopy specimen was QNS for Tempus tissue testing, I ordered repeat Tempus on the resection specimen to rule out any EGFR  or ALK mutation.  There was no mutation on liquid biopsy.  -He has received 2 cycles of adjuvant immunotherapy with atezolizumab.  He reports substantial treatment related side effects.  He is not experiencing any severe immunotherapy related adverse events that would necessitate immunotherapy discontinuation.  However he did not have any of the side effects on nivolumab and he and his wife would like to consider transitioning to this therapy.  There is data for adjuvant use of nivolumab as per the Vandana regimen ([facility] 2023; [phone]: [phone]) where patients received a 6-month course of adjuvant nivolumab after initial neoadjuvant nivolumab and platinum based chemotherapy.  -CBC CMP, TSH, and cortisol reviewed.  -Will also obtain a repeat PET/CT for restaging evaluation.  -Brain MRI shows no metastatic disease.    4.  Rib fracture  5.  Pain secondary #4  -Continue to use Lortab as needed.    -Reviewed anticipated course.    6.  Weight loss  7.  Low appetite/food aversion  -Encouraged him to schedule Zofran and switch Compazine to only at night to minimize sedation.    8.  Anemia  -Check additional labs including B12, folate, ferritin, iron profile, and SPEP    Follow-up in 1 week with treatment     Neetu Ashley MD  Hematology and Oncology     I have spent a total of 41 min on reviewing test results/preparing to see patient, counseling patient, performing medically appropriate exam and documenting clinical information in the electronic or other health record

## 2024-03-22 ENCOUNTER — TELEPHONE (OUTPATIENT)
Dept: ONCOLOGY | Facility: CLINIC | Age: 74
End: 2024-03-22
Payer: MEDICARE

## 2024-03-22 NOTE — TELEPHONE ENCOUNTER
Caller: ISAÍAS ANDERSON    Relationship to patient: Emergency Contact    Best call back number: [phone]    Chief complaint: R/S    Type of visit: LAB, F/U, INFUSION    Requested date: 3-29, 4-3, 4-4, 4-5     If rescheduling, when is the original appointment: 3-27    Additional notes: REQUESTING TO RE-SCHEDULE TILL AFTER THE PET SCAN ON 3-28

## 2024-03-26 NOTE — TELEPHONE ENCOUNTER
"Notified Balaji that Opdivo is not authorized yet and asked if she still wants to reschedule.  She stated she has some concerns and would like patient to still see Dr. Ashley at least.  Balaji stated she is \"tired of Dr. Ashley not giving him red blood cells and that patient needs them.\"  She stated she that she \"doesn't understand why Dr. Ashley won't order the blood test to check for leukemia.\"  Dr. Ashley notified.   "

## 2024-03-27 ENCOUNTER — HOSPITAL ENCOUNTER (OUTPATIENT)
Dept: ONCOLOGY | Facility: HOSPITAL | Age: 74
Discharge: HOME | End: 2024-03-27
Admitting: INTERNAL MEDICINE
Payer: MEDICARE

## 2024-03-27 ENCOUNTER — APPOINTMENT (OUTPATIENT)
Dept: ONCOLOGY | Facility: HOSPITAL | Age: 74
End: 2024-03-27
Payer: MEDICARE

## 2024-03-27 ENCOUNTER — OFFICE VISIT (OUTPATIENT)
Dept: ONCOLOGY | Facility: CLINIC | Age: 74
End: 2024-03-27
Payer: MEDICARE

## 2024-03-27 VITALS
HEIGHT: 72 IN | TEMPERATURE: 98.1 F | SYSTOLIC BLOOD PRESSURE: 151 MMHG | BODY MASS INDEX: 24.52 KG/M2 | DIASTOLIC BLOOD PRESSURE: 82 MMHG | WEIGHT: 181 LBS | OXYGEN SATURATION: 99 % | HEART RATE: 67 BPM

## 2024-03-27 DIAGNOSIS — C34.31 MALIGNANT NEOPLASM OF LOWER LOBE OF RIGHT LUNG: Primary | ICD-10-CM

## 2024-03-27 DIAGNOSIS — C34.91 BRONCHOGENIC CANCER OF RIGHT LUNG: ICD-10-CM

## 2024-03-27 LAB
ALBUMIN SERPL-MCNC: 3.7 G/DL (ref 3.5–5.2)
ALBUMIN/GLOB SERPL: 0.9 G/DL
ALP SERPL-CCNC: 93 U/L (ref 39–117)
ALT SERPL W P-5'-P-CCNC: 21 U/L (ref 1–41)
ANION GAP SERPL CALCULATED.3IONS-SCNC: 9 MMOL/L (ref 5–15)
AST SERPL-CCNC: 23 U/L (ref 1–40)
BASOPHILS # BLD AUTO: 0.02 10*3/MM3 (ref 0–0.2)
BASOPHILS NFR BLD AUTO: 0.2 % (ref 0–1.5)
BILIRUB SERPL-MCNC: 0.2 MG/DL (ref 0–1.2)
BUN SERPL-MCNC: 13 MG/DL (ref 8–23)
BUN/CREAT SERPL: 13.8 (ref 7–25)
CALCIUM SPEC-SCNC: 9.5 MG/DL (ref 8.6–10.5)
CHLORIDE SERPL-SCNC: 98 MMOL/L (ref 98–107)
CO2 SERPL-SCNC: 26 MMOL/L (ref 22–29)
CREAT SERPL-MCNC: 0.94 MG/DL (ref 0.76–1.27)
DEPRECATED RDW RBC AUTO: 52 FL (ref 37–54)
EGFRCR SERPLBLD CKD-EPI 2021: 84.5 ML/MIN/1.73
EOSINOPHIL # BLD AUTO: 0.22 10*3/MM3 (ref 0–0.4)
EOSINOPHIL NFR BLD AUTO: 2.5 % (ref 0.3–6.2)
ERYTHROCYTE [DISTWIDTH] IN BLOOD BY AUTOMATED COUNT: 14.3 % (ref 12.3–15.4)
FERRITIN SERPL-MCNC: 260.8 NG/ML (ref 30–400)
FOLATE SERPL-MCNC: 16.5 NG/ML (ref 4.78–24.2)
GLOBULIN UR ELPH-MCNC: 3.9 GM/DL
GLUCOSE SERPL-MCNC: 103 MG/DL (ref 65–99)
HCT VFR BLD AUTO: 33.5 % (ref 37.5–51)
HGB BLD-MCNC: 10.8 G/DL (ref 13–17.7)
IMM GRANULOCYTES # BLD AUTO: 0.01 10*3/MM3 (ref 0–0.05)
IMM GRANULOCYTES NFR BLD AUTO: 0.1 % (ref 0–0.5)
LYMPHOCYTES # BLD AUTO: 1.76 10*3/MM3 (ref 0.7–3.1)
LYMPHOCYTES NFR BLD AUTO: 19.8 % (ref 19.6–45.3)
MCH RBC QN AUTO: 31.4 PG (ref 26.6–33)
MCHC RBC AUTO-ENTMCNC: 32.2 G/DL (ref 31.5–35.7)
MCV RBC AUTO: 97.4 FL (ref 79–97)
MONOCYTES # BLD AUTO: 0.75 10*3/MM3 (ref 0.1–0.9)
MONOCYTES NFR BLD AUTO: 8.4 % (ref 5–12)
NEUTROPHILS NFR BLD AUTO: 6.13 10*3/MM3 (ref 1.7–7)
NEUTROPHILS NFR BLD AUTO: 69 % (ref 42.7–76)
PLATELET # BLD AUTO: 290 10*3/MM3 (ref 140–450)
PMV BLD AUTO: 8.4 FL (ref 6–12)
POTASSIUM SERPL-SCNC: 3.6 MMOL/L (ref 3.5–5.2)
PROT SERPL-MCNC: 7.6 G/DL (ref 6–8.5)
RBC # BLD AUTO: 3.44 10*6/MM3 (ref 4.14–5.8)
SODIUM SERPL-SCNC: 133 MMOL/L (ref 136–145)
T4 FREE SERPL-MCNC: 0.98 NG/DL (ref 0.92–1.68)
TSH SERPL DL<=0.05 MIU/L-ACNC: 1.52 UIU/ML (ref 0.27–4.2)
VIT B12 BLD-MCNC: 388 PG/ML (ref 211–946)
WBC NRBC COR # BLD AUTO: 8.89 10*3/MM3 (ref 3.4–10.8)

## 2024-03-27 PROCEDURE — 36591 DRAW BLOOD OFF VENOUS DEVICE: CPT

## 2024-03-27 PROCEDURE — 82728 ASSAY OF FERRITIN: CPT | Performed by: INTERNAL MEDICINE

## 2024-03-27 PROCEDURE — 25010000002 HEPARIN LOCK FLUSH PER 10 UNITS: Performed by: INTERNAL MEDICINE

## 2024-03-27 PROCEDURE — 82607 VITAMIN B-12: CPT | Performed by: INTERNAL MEDICINE

## 2024-03-27 PROCEDURE — 82746 ASSAY OF FOLIC ACID SERUM: CPT | Performed by: INTERNAL MEDICINE

## 2024-03-27 PROCEDURE — 80053 COMPREHEN METABOLIC PANEL: CPT | Performed by: INTERNAL MEDICINE

## 2024-03-27 PROCEDURE — 85025 COMPLETE CBC W/AUTO DIFF WBC: CPT | Performed by: INTERNAL MEDICINE

## 2024-03-27 PROCEDURE — 84165 PROTEIN E-PHORESIS SERUM: CPT | Performed by: INTERNAL MEDICINE

## 2024-03-27 PROCEDURE — 84443 ASSAY THYROID STIM HORMONE: CPT | Performed by: INTERNAL MEDICINE

## 2024-03-27 PROCEDURE — 84439 ASSAY OF FREE THYROXINE: CPT | Performed by: INTERNAL MEDICINE

## 2024-03-27 RX ORDER — SODIUM CHLORIDE 0.9 % (FLUSH) 0.9 %
10 SYRINGE (ML) INJECTION AS NEEDED
Status: DISCONTINUED | OUTPATIENT
Start: 2024-03-27 | End: 2024-03-28 | Stop reason: HOSPADM

## 2024-03-27 RX ORDER — SODIUM CHLORIDE 9 MG/ML
20 INJECTION, SOLUTION INTRAVENOUS ONCE
OUTPATIENT
Start: 2024-04-04

## 2024-03-27 RX ORDER — HEPARIN SODIUM (PORCINE) LOCK FLUSH IV SOLN 100 UNIT/ML 100 UNIT/ML
500 SOLUTION INTRAVENOUS AS NEEDED
OUTPATIENT
Start: 2024-03-27

## 2024-03-27 RX ORDER — HYDROCODONE BITARTRATE AND ACETAMINOPHEN 7.5; 325 MG/1; MG/1
1 TABLET ORAL EVERY 6 HOURS PRN
Qty: 60 TABLET | Refills: 0 | Status: SHIPPED | OUTPATIENT
Start: 2024-03-27

## 2024-03-27 RX ORDER — HEPARIN SODIUM (PORCINE) LOCK FLUSH IV SOLN 100 UNIT/ML 100 UNIT/ML
500 SOLUTION INTRAVENOUS AS NEEDED
Status: DISCONTINUED | OUTPATIENT
Start: 2024-03-27 | End: 2024-03-28 | Stop reason: HOSPADM

## 2024-03-27 RX ORDER — SODIUM CHLORIDE 0.9 % (FLUSH) 0.9 %
10 SYRINGE (ML) INJECTION AS NEEDED
OUTPATIENT
Start: 2024-03-27

## 2024-03-27 RX ADMIN — Medication 10 ML: at 14:07

## 2024-03-27 RX ADMIN — HEPARIN 500 UNITS: 100 SYRINGE at 14:07

## 2024-03-27 NOTE — PROGRESS NOTES
[facility] [place]  Office number [phone]    Fax number [phone]     Follow up     Date: 24    Patient Name: David Barfield  MRN: [id number]  : 1950    Referring Physician: Dr. Sampson    Chief Complaint: Nonsmall cell lung cancer follow-up on treatment    Cancer Staging:  Cancer Staging   Stage IIIA (cT2b, cN2, cM0)    History of Present Illness: David Barfield is a pleasant 74 y.o. male who presents today for evaluation of NSCLC. He has a 50 pack year smoking history.    He has a history of a PET avid subpleural RLL lung nodule [facility] [place] [facility]. This had an SUV of 9.8 on PET  in association with increased mediastinal LN uptake with SUV around 3. Imaging was felt secondary to osteophyte fibrosis. He did undergo bronchoscopy 2020 with negative cytology. The RLL lung nodule was not felt amenable to bronchoscopy biopsy.    CT lung screen 2023 showed:      PET CT showed mass in the RLL intensely SUV avid, max 17. Mediastinal LN not hypermetabolic above baseline.     Right lower lobe robotic transbronchial biopsy and cytology on 9/15/2023 showed benign findings. Cultures including AFT and fungal were negative.  Station 11L, Station 4L LN negative  Station 7 LN showed NSCLCa (positive for cytokeratin 7, p63, and TTF-1 with no significant staining for p40 or Napsin. The staining pattern raises the possibility of mixed adenocarcinoma and squamous cell carcinoma differentiation in this tumor). PDL1 negative.    Tempus negative for targetable mutations on liquid biopsy. QNS on tissue at diagnosis.    MRI brain was negative.    He has a history of sick sinus syndrome s/p PPM and moderate COPD. He describes only mild physical limitations from this. For example he recently walked 1.5 miles at [facility]. At home, he climbs 17 steps without issue. He does sit down with long activities.     Following neoadjuvant chemoimmunotherapy  "he underwent lobectomy and mediastinal lymph node dissection on January 9, 2024.  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  Margins were negative.  Regional lymph nodes including 4R, 12 R, and 7 were negative as were 5 intralobar lymph nodes.    Treatment history:  Carbo, taxol, nivo, C1 10/24/23; C2 11/15/23; C3 11/21/23  Atezolizumab maintenance, cycle 1 2/6/2024; cycle 2 2/27/24    Interval history:   Mr. Barfield is here in the company of his significant other for follow-up.  Felt \"Knocked out\" with remeron, loopy for 24 hours after and discontinued after 1 dose  Nausea x 1 this am responded to antiemetics, had not required antiemetics in nearly a week leading up to this.   Reports his energy is better. His significant other states he is still very low energy.   Able to get dressed on his own, shower, but needs to rest after that. Walks to mailbox to get mail, spends a lot of day in the recliner. Not out of breath with ambulating to restroom but significant other notes he is too weak to walk to kitchen and won't tell how bad he feels at times. He is not generally dizzy with ambulation.   If he takes Lortab he can ambulate better has more energy and motivation to be able to get up to do things. Takes one every other day. It is constipating but dulcolax resolves it. He would take it more often but was afraid of running out.  Most of his pain is in his right rib cage at the site of his rib fracture.  He feels many of his side effects are related to immunotherapy and have improved with further time out from immunotherapy.  He is interested in proceeding with Opdivo but is hesitant to consider any further atezolizumab.  His significant other also has questions about Keytruda.  They also have questions regarding the timing of Opdivo initiation.  His significant other clarified that at the time " of her discussion with my nurse yesterday she was under the impression that I had not requested the Opdivo until her phone call yesterday.  Unfortunately she just found out she has breast cancer yesterday and has a consultation pending with her surgeon to determine next steps.  They are both overwhelmed and stressed.    Past Medical History:   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    Diabetes mellitus Borderline    Emphysema/COPD     Erectile disorder     GERD (gastroesophageal reflux disease)     History of chemotherapy     Hyperlipidemia     Hypertension 2019    Lung nodule     Mumps     Mumps     Pruritus     after bath    Slow to wake up after anesthesia     Wears dentures     upper only    Wears hearing aid in both ears     usually only wears right   No personal history of myocardial infarction, cerebrovascular event, or venous thromboembolism.  No autoimmune diseases.   No prior cscope, mailed cologuard recently    Past Surgical History:   Past Surgical History:   Procedure Laterality Date    BONE BIOPSY      broken bone surgery in his face    BRONCHOSCOPY THORACOTOMY Right 1/9/2024    Procedure: THORACOTOMY FOR LOWER LOBECTOMY AND MEDISTINAL LYMPH NODE DISSECTION RIGHT;  Surgeon: Joey Patel MD;  Location:  [facility];  Service: Cardiothoracic;  Laterality: Right;    BRONCHOSCOPY WITH ION ROBOTIC ASSIST N/A 09/15/2023    Procedure: BRONCHOSCOPY NAVIGATION WITH ENDOBRONCHIAL ULTRASOUND AND ION ROBOT;  Surgeon: Octaviano Sampson MD;  Location:  [facility];  Service: Robotics - Pulmonary;  Laterality: N/A;  ion #6 - 0032  - 0015  Cath guide 0061    EBUS balloon removed and intact    CARDIAC ELECTROPHYSIOLOGY PROCEDURE N/A 08/17/2021    Procedure: Pacemaker DC new;  Surgeon: Kayy Box MD;  Location:  [facility];  Service: Cardiology;  Laterality: N/A;     FACIAL FRACTURE SURGERY      PACEMAKER IMPLANTATION       Family History:   Family History   Problem Relation Age of Onset    Aneurysm Mother         brain    Dementia Father     Leukemia Sister     Heart disease Paternal Grandmother     Hypertension Paternal Grandfather    Sister leukemia at age 21  No other malignancy    Social History:   Social History     Socioeconomic History    Marital status: Significant Other    Number of children: 3   Tobacco Use    Smoking status: Former     Current packs/day: 1.00     Average packs/day: 1 pack/day for 56.2 years (56.2 ttl pk-yrs)     Types: Cigarettes     Start date: 1/1/1968    Smokeless tobacco: Never    Tobacco comments:     Pt states that he quit smoking on 1/8/24. The day before his sx.    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 port, Disp: , Rfl:     cefdinir (OMNICEF) 300 MG capsule, Take 1 capsule by mouth 2 (Two) Times a Day., Disp: 10 capsule, Rfl: 0    fluticasone (VERAMYST) 27.5 MCG/SPRAY nasal spray, 2 sprays into the nostril(s) as directed by provider 1 (One) Time As Needed for Rhinitis or Allergies., Disp: 6 mL, Rfl: 2    Fluticasone-Umeclidin-Vilant (Trelegy Ellipta) 100-62.5-25 MCG/ACT inhaler, Inhale 1 puff Daily., Disp: 3 each, Rfl: 3    HYDROcodone-acetaminophen (Norco) 7.5-325 MG per tablet, Take 1 tablet by mouth Every 6 (Six) Hours As Needed for Moderate Pain., Disp: 25 tablet, Rfl: 0    lidocaine-prilocaine (EMLA)  "2.5-2.5 % cream, Apply 1 application  topically to the appropriate area as directed As Needed (45-60 minutes prior to port access.  Cover with saran/plastic wrap.)., Disp: 30 g, Rfl: 3    lisinopril (PRINIVIL,ZESTRIL) 2.5 MG tablet, Take 1 tablet by mouth As Needed (elevated blood pressure). Take 1/2 tablet po prn (Patient taking differently: Take 1 tablet by mouth As Needed (elevated blood pressure systolic over 140). Take 1/2 tablet po prn), Disp: 30 tablet, Rfl: 1    mirtazapine (REMERON) 15 MG tablet, Take 1 tablet by mouth Every Night., Disp: 30 tablet, Rfl: 0    omeprazole (priLOSEC) 40 MG capsule, Take 1 capsule by mouth Daily., Disp: 30 capsule, Rfl: 5    ondansetron (ZOFRAN) 8 MG tablet, Take 1 tablet by mouth 3 (Three) Times a Day As Needed for Nausea or Vomiting., Disp: 30 tablet, Rfl: 2    pravastatin (PRAVACHOL) 80 MG tablet, Take 1 tablet by mouth Every Night., Disp: 30 tablet, Rfl: 11    prochlorperazine (COMPAZINE) 10 MG tablet, Take 0.5 tablets by mouth Every 6 (Six) Hours As Needed for Nausea., Disp: 30 tablet, Rfl: 1    sildenafil (VIAGRA) 100 MG tablet, Take 0.5 tablets by mouth Daily As Needed for Erectile Dysfunction., Disp: , Rfl:     tamsulosin (FLOMAX) 0.4 MG capsule 24 hr capsule, Take 1 capsule by mouth Daily., Disp: 30 capsule, Rfl: 1    Allergies:   Allergies   Allergen Reactions    Cymbalta [Duloxetine Hcl] GI Intolerance    Gabapentin Mental Status Change     Pt states that this medication \"makes him feel foolish in his head\".     Toradol [Ketorolac Tromethamine] GI Intolerance     Projectile vomiting     Latex Other (See Comments)     Latex allergy     Tape Rash       Objective     Vital Signs:   Vitals:    03/27/24 1247   BP: 151/82   Pulse: 67   Temp: 98.1 °F (36.7 °C)   TempSrc: Temporal   SpO2: 99%   Weight: 82.1 kg (181 lb)   Height: 182.9 cm (72\")   PainSc: 0-No pain    Body mass index is 24.55 kg/m².   Pain Score    03/27/24 1247   PainSc: 0-No pain       ECOG Performance " Status: 1 - Symptomatic but completely ambulatory    Physical Exam:   General: No acute distress.   HEENT: Normocephalic, atraumatic. Sclera anicteric.   Neck: supple, no adenopathy.   Cardiovascular: RRR no murmurs  Respiratory: Clear to auscultation bilaterally.  Abdomen: Soft, nontender, non distended with normoactive bowel sounds  Lymph: no cervical, supraclavicular or axillary adenopathy  Neuro: Alert and oriented x 3. No focal deficits.   Ext: Symmetric, no swelling.   Accurate as of 3/27/24    Laboratory/Imaging Reviewed:   Hospital Outpatient Visit on 03/27/2024   Component Date Value Ref Range Status    Ferritin 03/27/2024 260.80  30.00 - 400.00 ng/mL Final    Glucose 03/27/2024 103 (H)  65 - 99 mg/dL Final    BUN 03/27/2024 13  8 - 23 mg/dL Final    Creatinine 03/27/2024 0.94  0.76 - 1.27 mg/dL Final    Sodium 03/27/2024 133 (L)  136 - 145 mmol/L Final    Potassium 03/27/2024 3.6  3.5 - 5.2 mmol/L Final    Slight hemolysis detected by analyzer. Result may be falsely elevated.    Chloride 03/27/2024 98  98 - 107 mmol/L Final    CO2 03/27/2024 26.0  22.0 - 29.0 mmol/L Final    Calcium 03/27/2024 9.5  8.6 - 10.5 mg/dL Final    Total Protein 03/27/2024 7.6  6.0 - 8.5 g/dL Final    Albumin 03/27/2024 3.7  3.5 - 5.2 g/dL Final    ALT (SGPT) 03/27/2024 21  1 - 41 U/L Final    AST (SGOT) 03/27/2024 23  1 - 40 U/L Final    Alkaline Phosphatase 03/27/2024 93  39 - 117 U/L Final    Total Bilirubin 03/27/2024 0.2  0.0 - 1.2 mg/dL Final    Globulin 03/27/2024 3.9  gm/dL Final    Calculated Result    A/G Ratio 03/27/2024 0.9  g/dL Final    BUN/Creatinine Ratio 03/27/2024 13.8  7.0 - 25.0 Final    Anion Gap 03/27/2024 9.0  5.0 - 15.0 mmol/L Final    eGFR 03/27/2024 84.5  >60.0 mL/min/1.73 Final    TSH 03/27/2024 1.520  0.270 - 4.200 uIU/mL Final    Free T4 03/27/2024 0.98  0.92 - 1.68 ng/dL Final    WBC 03/27/2024 8.89  3.40 - 10.80 10*3/mm3 Final    RBC 03/27/2024 3.44 (L)  4.14 - 5.80 10*6/mm3 Final    Hemoglobin  03/27/2024 10.8 (L)  13.0 - 17.7 g/dL Final    Hematocrit 03/27/2024 33.5 (L)  37.5 - 51.0 % Final    MCV 03/27/2024 97.4 (H)  79.0 - 97.0 fL Final    MCH 03/27/2024 31.4  26.6 - 33.0 pg Final    MCHC 03/27/2024 32.2  31.5 - 35.7 g/dL Final    RDW 03/27/2024 14.3  12.3 - 15.4 % Final    RDW-SD 03/27/2024 52.0  37.0 - 54.0 fl Final    MPV 03/27/2024 8.4  6.0 - 12.0 fL Final    Platelets 03/27/2024 290  140 - 450 10*3/mm3 Final    Neutrophil % 03/27/2024 69.0  42.7 - 76.0 % Final    Lymphocyte % 03/27/2024 19.8  19.6 - 45.3 % Final    Monocyte % 03/27/2024 8.4  5.0 - 12.0 % Final    Eosinophil % 03/27/2024 2.5  0.3 - 6.2 % Final    Basophil % 03/27/2024 0.2  0.0 - 1.5 % Final    Immature Grans % 03/27/2024 0.1  0.0 - 0.5 % Final    Neutrophils, Absolute 03/27/2024 6.13  1.70 - 7.00 10*3/mm3 Final    Lymphocytes, Absolute 03/27/2024 1.76  0.70 - 3.10 10*3/mm3 Final    Monocytes, Absolute 03/27/2024 0.75  0.10 - 0.90 10*3/mm3 Final    Eosinophils, Absolute 03/27/2024 0.22  0.00 - 0.40 10*3/mm3 Final    Basophils, Absolute 03/27/2024 0.02  0.00 - 0.20 10*3/mm3 Final    Immature Grans, Absolute 03/27/2024 0.01  0.00 - 0.05 10*3/mm3 Final   Hospital Outpatient Visit on 03/20/2024   Component Date Value Ref Range Status    Glucose 03/20/2024 97  65 - 99 mg/dL Final    BUN 03/20/2024 10  8 - 23 mg/dL Final    Creatinine 03/20/2024 1.08  0.76 - 1.27 mg/dL Final    Sodium 03/20/2024 134 (L)  136 - 145 mmol/L Final    Potassium 03/20/2024 3.7  3.5 - 5.2 mmol/L Final    Chloride 03/20/2024 100  98 - 107 mmol/L Final    CO2 03/20/2024 25.0  22.0 - 29.0 mmol/L Final    Calcium 03/20/2024 9.5  8.6 - 10.5 mg/dL Final    Total Protein 03/20/2024 7.5  6.0 - 8.5 g/dL Final    Albumin 03/20/2024 3.6  3.5 - 5.2 g/dL Final    ALT (SGPT) 03/20/2024 16  1 - 41 U/L Final    AST (SGOT) 03/20/2024 15  1 - 40 U/L Final    Alkaline Phosphatase 03/20/2024 97  39 - 117 U/L Final    Total Bilirubin 03/20/2024 0.2  0.0 - 1.2 mg/dL Final    Globulin  03/20/2024 3.9  gm/dL Final    Calculated Result    A/G Ratio 03/20/2024 0.9  g/dL Final    BUN/Creatinine Ratio 03/20/2024 9.3  7.0 - 25.0 Final    Anion Gap 03/20/2024 9.0  5.0 - 15.0 mmol/L Final    eGFR 03/20/2024 71.6  >60.0 mL/min/1.73 Final    TSH 03/20/2024 1.360  0.270 - 4.200 uIU/mL Final    Free T4 03/20/2024 0.94  0.92 - 1.68 ng/dL Final    WBC 03/20/2024 9.23  3.40 - 10.80 10*3/mm3 Final    RBC 03/20/2024 3.45 (L)  4.14 - 5.80 10*6/mm3 Final    Hemoglobin 03/20/2024 10.9 (L)  13.0 - 17.7 g/dL Final    Hematocrit 03/20/2024 33.9 (L)  37.5 - 51.0 % Final    MCV 03/20/2024 98.3 (H)  79.0 - 97.0 fL Final    MCH 03/20/2024 31.6  26.6 - 33.0 pg Final    MCHC 03/20/2024 32.2  31.5 - 35.7 g/dL Final    RDW 03/20/2024 14.8  12.3 - 15.4 % Final    RDW-SD 03/20/2024 54.7 (H)  37.0 - 54.0 fl Final    MPV 03/20/2024 8.4  6.0 - 12.0 fL Final    Platelets 03/20/2024 227  140 - 450 10*3/mm3 Final    Neutrophil % 03/20/2024 67.9  42.7 - 76.0 % Final    Lymphocyte % 03/20/2024 18.3 (L)  19.6 - 45.3 % Final    Monocyte % 03/20/2024 11.2  5.0 - 12.0 % Final    Eosinophil % 03/20/2024 2.3  0.3 - 6.2 % Final    Basophil % 03/20/2024 0.2  0.0 - 1.5 % Final    Immature Grans % 03/20/2024 0.1  0.0 - 0.5 % Final    Neutrophils, Absolute 03/20/2024 6.27  1.70 - 7.00 10*3/mm3 Final    Lymphocytes, Absolute 03/20/2024 1.69  0.70 - 3.10 10*3/mm3 Final    Monocytes, Absolute 03/20/2024 1.03 (H)  0.10 - 0.90 10*3/mm3 Final    Eosinophils, Absolute 03/20/2024 0.21  0.00 - 0.40 10*3/mm3 Final    Basophils, Absolute 03/20/2024 0.02  0.00 - 0.20 10*3/mm3 Final    Immature Grans, Absolute 03/20/2024 0.01  0.00 - 0.05 10*3/mm3 Final    Cortisol 03/20/2024 6.56    mcg/dL Final       MRI Brain With & Without Contrast    Result Date: 3/7/2024  Narrative: MRI BRAIN W WO CONTRAST Date of Exam: 3/7/2024 9:19 AM EST Indication: lung cancer staging.  Comparison: 9/28/2023. Technique:  Routine multiplanar/multisequence sequence images of the brain  were obtained before and after the uneventful administration of 17 mL Multihance. Findings: No acute infarct is present on diffusion weighted sequences. Midline structures are normal and the craniocervical junction appears satisfactory. Age-related changes are present with moderate generalized volume loss and typical T2 hyperintense subcortical  and pontine white matter changes, nonspecific but favored to reflect sequela of chronic microvascular ischemia. There is otherwise no evidence of intracranial hemorrhage, mass or mass effect. The ventricles demonstrate mild expected ex vacuo prominence.  The orbits are normal. The paranasal sinuses appear clear. Intracranial arterial flow voids are maintained. There is no abnormal brain parenchymal enhancement.     Impression: Impression: No evidence of intracranial metastasis. Moderate typical chronic and age-related changes are noted as above. Electronically Signed: Alexandre Matos MD  3/7/2024 11:04 AM EST  Workstation ID: [id number]     Procedures    Assessment / Plan      Assessment/Plan:     Nonsmall cell lung cancer of the RLL, Stage IIIA  Immunotherapy encounter  -He has an enlarging RLL nodule with SUV of 17. Despite negative transbronchial biopsy, he was found to have N2 disease with a positive level 7 LN. We discussed options for definitive treatment to include induction chemo immunotherapy followed by surgical resection, chemoradiation followed by surgical resection, or definitive chemoradiation. We discussed differences in schedules and side effects. He has no contraindications to immunotherapy and I recommended nivolumab + chemotherapy induction.  His case was reviewed at multidisciplinary tumor board including thoracic surgery.  He was felt to be a good candidate for neoadjuvant chemo immunotherapy followed by resection assessment.  He completed 3 cycles of  nivolumab, carboplatin, paclitaxel x3 cycles in a neoadjuvant fashion.  He underwent right lower lobectomy  and lymph node dissection.  -Final pathology reviewed and shows 1.4 cm of residual disease with extensive treatment effect.  The previously biopsied lymph node was negative on mediastinal dissection. Because his original bronchoscopy specimen was QNS for Tempus tissue testing, I ordered repeat Tempus on the resection specimen to rule out any EGFR or ALK mutation.  There was no mutation on liquid biopsy.  -His case was reviewed at multidisciplinary tumor board and consensus was to proceed with adjuvant immunotherapy without any indication for further chemotherapy or adjuvant radiation.  We proceeded with atezolizumab per the [id number] regimen as per NCCN guidelines. He has received 2 cycles of adjuvant immunotherapy with atezolizumab.  He reports substantial treatment related side effects.  He has not experienced any severe immunotherapy related adverse events that would necessitate cessation of all immunotherapy drugs.  However he has had intolerable side effects on atezolizumab including severe nausea and fatigue that last for a week and impair his quality of life, such that he does not feel he can continue with the atezolizumab treatments.  Because he had an excellent response to chemoimmunotherapy with the use of nivolumab, because he cannot continue the atezolizumab due to side effects, and because he did not have any severe immune related adverse events that would be considered a contraindication to further immunotherapy, I recommended that we proceed with adjuvant nivolumab.  In the Checkmate 77T regimen, [facility]; [phone]) patients received a 6-month course of adjuvant nivolumab after initial neoadjuvant nivolumab and platinum based chemotherapy, which is consistent with his neoadjuvant treatment to which he responded well.   -PET CT is pending this week. He is aware that I am out of town and we will arrange a follow up appt in 2 weeks to discuss these results. He was offered a visit with an  NP next week but wished to defer that.I will tentatively schedule him for Opdivo in 1 week after his insurance plan can be complianced through the prior authorization team.  I clarified with the patient that I did place the plan for authorization and have been in contact with the authorization team since 3/22/2024.  I made a request with the supervisor at this department to try to have this request expedited.  -Brain MRI shows no meta static disease.  -I did have a noemi conversation with the patient and his significant other regarding their concerns regarding his care.  The patient's significant other states that she has been trying to get restaging imaging performed since his surgery and we reviewed his restaging scans that occurred in February.  She also expresses concern regarding his vertebrae and has previously expressed concerns whether the correct portion of his lung has been removed.  We have previously spent multiple visits personally reviewing all of his imaging in detail and allowing her to photograph imaging in an effort to address her questions regarding the primary location of this tumor with respect to the area that was resected.  Additionally, she is very concerned today regarding his need for blood transfusion.  We reviewed recommendations against transfusions for patients who have a hemoglobin greater than 10 and standard recommendation for transfusion between 7 and 8 depending on symptoms.  He does have labs which we had ordered last week that were collected today to evaluate for potential etiologies of her persistent anemia.  We discussed that if his blood counts fail to recover from recent chemotherapy we could pursue a bone marrow biopsy.    -I offered the patient a second opinion or referral for care outside of our practice.  I reiterated the importance of maintaining a therapeutic relationship based on trust and that if they are concerned that they are not appropriate care, I would recommend  transfer to another oncologist.    -The patient's significant other did share that she feels better knowing that my team has been working on obtaining insurance authorization since last week and that we have escalated this concern to a supervisor.  She also shares that she had a particular recent stressful visit with her doctor yesterday and has now learned that she has breast cancer.  Will proceed with imaging, treatment, and follow-up as outlined above.    4.  Rib fracture  5.  Pain secondary #4  -I recommended he increase the frequency of Lortab use to once to twice daily as needed.  Utilize laxative to prevent constipation.  Both the patient and his significant other believes that some of his fatigue is related to poorly controlled pain.    6.  Intolerance of antidepressants  -Recommended consideration of pharmacodynamic testing before future antidepressant prescription.    8.  Anemia  -Pending labs including B12, folate, ferritin, iron profile, and SPEP    Follow-up in 1 week with treatment  2 weeks MD/PET review     Neetu Ashley MD  Hematology and Oncology     I have spent a total of 46 min on reviewing test results/preparing to see patient, counseling patient, performing medically appropriate exam and documenting clinical information in the electronic or other health record

## 2024-03-28 ENCOUNTER — HOSPITAL ENCOUNTER (OUTPATIENT)
Dept: PET IMAGING | Facility: HOSPITAL | Age: 74
Discharge: HOME | End: 2024-03-28
Payer: MEDICARE

## 2024-03-28 DIAGNOSIS — C34.31 MALIGNANT NEOPLASM OF LOWER LOBE OF RIGHT LUNG: ICD-10-CM

## 2024-03-28 LAB
ALBUMIN SERPL ELPH-MCNC: 3.3 G/DL (ref 2.9–4.4)
ALBUMIN/GLOB SERPL: 0.8 {RATIO} (ref 0.7–1.7)
ALPHA1 GLOB SERPL ELPH-MCNC: 0.3 G/DL (ref 0–0.4)
ALPHA2 GLOB SERPL ELPH-MCNC: 1 G/DL (ref 0.4–1)
B-GLOBULIN SERPL ELPH-MCNC: 1.2 G/DL (ref 0.7–1.3)
GAMMA GLOB SERPL ELPH-MCNC: 1.5 G/DL (ref 0.4–1.8)
GLOBULIN SER CALC-MCNC: 4 G/DL (ref 2.2–3.9)
GLUCOSE BLDC GLUCOMTR-MCNC: 97 MG/DL (ref 70–130)
LABORATORY COMMENT REPORT: ABNORMAL
M PROTEIN SERPL ELPH-MCNC: ABNORMAL G/DL
PROT PATTERN SERPL ELPH-IMP: ABNORMAL
PROT SERPL-MCNC: 7.3 G/DL (ref 6–8.5)

## 2024-03-28 PROCEDURE — 82948 REAGENT STRIP/BLOOD GLUCOSE: CPT

## 2024-03-28 PROCEDURE — 0 FLUDEOXYGLUCOSE F18 SOLUTION: Performed by: INTERNAL MEDICINE

## 2024-03-28 PROCEDURE — 78815 PET IMAGE W/CT SKULL-THIGH: CPT

## 2024-03-28 PROCEDURE — A9552 F18 FDG: HCPCS | Performed by: INTERNAL MEDICINE

## 2024-03-28 RX ADMIN — FLUDEOXYGLUCOSE F 18 1 DOSE: 200 INJECTION, SOLUTION INTRAVENOUS at 12:04

## 2024-03-29 ENCOUNTER — TELEPHONE (OUTPATIENT)
Dept: ONCOLOGY | Facility: CLINIC | Age: 74
End: 2024-03-29
Payer: MEDICARE

## 2024-03-29 NOTE — TELEPHONE ENCOUNTER
Called to notify patient or his spouse/significant other per Dr. Ashley that our authorization team has completed the compliance process and patient is okay to proceed with treatment as scheduled.  No answer received.  Left  requesting return call.

## 2024-04-03 ENCOUNTER — TELEPHONE (OUTPATIENT)
Dept: ONCOLOGY | Facility: CLINIC | Age: 74
End: 2024-04-03
Payer: MEDICARE

## 2024-04-03 NOTE — TELEPHONE ENCOUNTER
Notified patient and his spouse that chemo approved.  They stated he will be at the appointment tomorrow.

## 2024-04-04 ENCOUNTER — APPOINTMENT (OUTPATIENT)
Dept: ONCOLOGY | Facility: HOSPITAL | Age: 74
End: 2024-04-04
Payer: MEDICARE

## 2024-04-04 ENCOUNTER — DOCUMENTATION (OUTPATIENT)
Dept: NUTRITION | Facility: HOSPITAL | Age: 74
End: 2024-04-04
Payer: MEDICARE

## 2024-04-04 ENCOUNTER — HOSPITAL ENCOUNTER (OUTPATIENT)
Dept: ONCOLOGY | Facility: HOSPITAL | Age: 74
Discharge: HOME | End: 2024-04-04
Admitting: INTERNAL MEDICINE
Payer: MEDICARE

## 2024-04-04 VITALS
HEART RATE: 80 BPM | DIASTOLIC BLOOD PRESSURE: 63 MMHG | WEIGHT: 178 LBS | TEMPERATURE: 97.4 F | HEIGHT: 72 IN | SYSTOLIC BLOOD PRESSURE: 119 MMHG | BODY MASS INDEX: 24.11 KG/M2

## 2024-04-04 DIAGNOSIS — C34.31 MALIGNANT NEOPLASM OF LOWER LOBE OF RIGHT LUNG: Primary | ICD-10-CM

## 2024-04-04 LAB
ALBUMIN SERPL-MCNC: 3.4 G/DL (ref 3.5–5.2)
ALBUMIN/GLOB SERPL: 0.8 G/DL
ALP SERPL-CCNC: 93 U/L (ref 39–117)
ALT SERPL W P-5'-P-CCNC: 40 U/L (ref 1–41)
ANION GAP SERPL CALCULATED.3IONS-SCNC: 8 MMOL/L (ref 5–15)
AST SERPL-CCNC: 35 U/L (ref 1–40)
BASOPHILS # BLD AUTO: 0.02 10*3/MM3 (ref 0–0.2)
BASOPHILS NFR BLD AUTO: 0.2 % (ref 0–1.5)
BILIRUB SERPL-MCNC: <0.2 MG/DL (ref 0–1.2)
BUN SERPL-MCNC: 27 MG/DL (ref 8–23)
BUN/CREAT SERPL: 13.4 (ref 7–25)
CALCIUM SPEC-SCNC: 9.8 MG/DL (ref 8.6–10.5)
CHLORIDE SERPL-SCNC: 98 MMOL/L (ref 98–107)
CO2 SERPL-SCNC: 26 MMOL/L (ref 22–29)
CREAT SERPL-MCNC: 2.02 MG/DL (ref 0.76–1.27)
DEPRECATED RDW RBC AUTO: 53.1 FL (ref 37–54)
EGFRCR SERPLBLD CKD-EPI 2021: 33.8 ML/MIN/1.73
EOSINOPHIL # BLD AUTO: 0.14 10*3/MM3 (ref 0–0.4)
EOSINOPHIL NFR BLD AUTO: 1.1 % (ref 0.3–6.2)
ERYTHROCYTE [DISTWIDTH] IN BLOOD BY AUTOMATED COUNT: 14.7 % (ref 12.3–15.4)
GLOBULIN UR ELPH-MCNC: 4.3 GM/DL
GLUCOSE SERPL-MCNC: 111 MG/DL (ref 65–99)
HCT VFR BLD AUTO: 32.7 % (ref 37.5–51)
HGB BLD-MCNC: 10.5 G/DL (ref 13–17.7)
IMM GRANULOCYTES # BLD AUTO: 0.06 10*3/MM3 (ref 0–0.05)
IMM GRANULOCYTES NFR BLD AUTO: 0.5 % (ref 0–0.5)
LYMPHOCYTES # BLD AUTO: 1.57 10*3/MM3 (ref 0.7–3.1)
LYMPHOCYTES NFR BLD AUTO: 12.4 % (ref 19.6–45.3)
MCH RBC QN AUTO: 30.9 PG (ref 26.6–33)
MCHC RBC AUTO-ENTMCNC: 32.1 G/DL (ref 31.5–35.7)
MCV RBC AUTO: 96.2 FL (ref 79–97)
MONOCYTES # BLD AUTO: 1.18 10*3/MM3 (ref 0.1–0.9)
MONOCYTES NFR BLD AUTO: 9.4 % (ref 5–12)
NEUTROPHILS NFR BLD AUTO: 76.4 % (ref 42.7–76)
NEUTROPHILS NFR BLD AUTO: 9.65 10*3/MM3 (ref 1.7–7)
PLATELET # BLD AUTO: 276 10*3/MM3 (ref 140–450)
PMV BLD AUTO: 8.4 FL (ref 6–12)
POTASSIUM SERPL-SCNC: 3.6 MMOL/L (ref 3.5–5.2)
PROT SERPL-MCNC: 7.7 G/DL (ref 6–8.5)
RBC # BLD AUTO: 3.4 10*6/MM3 (ref 4.14–5.8)
SODIUM SERPL-SCNC: 132 MMOL/L (ref 136–145)
T4 FREE SERPL-MCNC: 0.98 NG/DL (ref 0.92–1.68)
TSH SERPL DL<=0.05 MIU/L-ACNC: 1.37 UIU/ML (ref 0.27–4.2)
WBC NRBC COR # BLD AUTO: 12.62 10*3/MM3 (ref 3.4–10.8)

## 2024-04-04 PROCEDURE — 25010000002 NIVOLUMAB 240 MG/24ML SOLUTION 24 ML VIAL: Performed by: INTERNAL MEDICINE

## 2024-04-04 PROCEDURE — 80053 COMPREHEN METABOLIC PANEL: CPT | Performed by: INTERNAL MEDICINE

## 2024-04-04 PROCEDURE — 96361 HYDRATE IV INFUSION ADD-ON: CPT

## 2024-04-04 PROCEDURE — 85025 COMPLETE CBC W/AUTO DIFF WBC: CPT | Performed by: INTERNAL MEDICINE

## 2024-04-04 PROCEDURE — 25010000002 HEPARIN LOCK FLUSH PER 10 UNITS: Performed by: INTERNAL MEDICINE

## 2024-04-04 PROCEDURE — 25810000003 SODIUM CHLORIDE 0.9 % SOLUTION: Performed by: INTERNAL MEDICINE

## 2024-04-04 PROCEDURE — 84443 ASSAY THYROID STIM HORMONE: CPT | Performed by: INTERNAL MEDICINE

## 2024-04-04 PROCEDURE — 84439 ASSAY OF FREE THYROXINE: CPT | Performed by: INTERNAL MEDICINE

## 2024-04-04 PROCEDURE — 96413 CHEMO IV INFUSION 1 HR: CPT

## 2024-04-04 RX ORDER — SODIUM CHLORIDE 0.9 % (FLUSH) 0.9 %
10 SYRINGE (ML) INJECTION AS NEEDED
Status: DISCONTINUED | OUTPATIENT
Start: 2024-04-04 | End: 2024-04-05 | Stop reason: HOSPADM

## 2024-04-04 RX ORDER — SODIUM CHLORIDE 9 MG/ML
1000 INJECTION, SOLUTION INTRAVENOUS CONTINUOUS
Status: CANCELLED
Start: 2024-04-04

## 2024-04-04 RX ORDER — HEPARIN SODIUM (PORCINE) LOCK FLUSH IV SOLN 100 UNIT/ML 100 UNIT/ML
500 SOLUTION INTRAVENOUS AS NEEDED
Status: DISCONTINUED | OUTPATIENT
Start: 2024-04-04 | End: 2024-04-05 | Stop reason: HOSPADM

## 2024-04-04 RX ORDER — HEPARIN SODIUM (PORCINE) LOCK FLUSH IV SOLN 100 UNIT/ML 100 UNIT/ML
500 SOLUTION INTRAVENOUS AS NEEDED
OUTPATIENT
Start: 2024-04-04

## 2024-04-04 RX ORDER — SODIUM CHLORIDE 9 MG/ML
20 INJECTION, SOLUTION INTRAVENOUS ONCE
Status: DISCONTINUED | OUTPATIENT
Start: 2024-04-04 | End: 2024-04-05 | Stop reason: HOSPADM

## 2024-04-04 RX ORDER — SODIUM CHLORIDE 0.9 % (FLUSH) 0.9 %
10 SYRINGE (ML) INJECTION AS NEEDED
OUTPATIENT
Start: 2024-04-04

## 2024-04-04 RX ORDER — SODIUM CHLORIDE 9 MG/ML
1000 INJECTION, SOLUTION INTRAVENOUS CONTINUOUS
Status: DISCONTINUED | OUTPATIENT
Start: 2024-04-04 | End: 2024-04-05 | Stop reason: HOSPADM

## 2024-04-04 RX ADMIN — SODIUM CHLORIDE 480 MG: 9 INJECTION, SOLUTION INTRAVENOUS at 14:15

## 2024-04-04 RX ADMIN — Medication 10 ML: at 15:23

## 2024-04-04 RX ADMIN — SODIUM CHLORIDE 1000 ML/HR: 9 INJECTION, SOLUTION INTRAVENOUS at 14:04

## 2024-04-04 RX ADMIN — HEPARIN 500 UNITS: 100 SYRINGE at 15:23

## 2024-04-04 NOTE — PROGRESS NOTES
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, Stage IIIA      Surgery: Lobectomy and mediastinal lymph node dissection - 1/9/24     Neoadjuvant Chemotherapy: OP LUNG  Nivolumab /  PACLitaxel / CARBOplatin - 3/3 cycles completed 12/5/23      Adjuvant Treatment: OP LUNG Atezolizumab - every 21 days - Discontinued related to intolerance    Current Immunotherapy:  OP LUNG Nivolumab 480 mg - 6 cycles - start date 4/4/24     Weight -  178 lbs / 19 lbs weight loss past 3 months (10% weight loss)    Nutrition Diagnosis      Problem Unintentional weight loss / malnutrition in context of chronic illness   Etiology Diagnosis related  Diminished appetite  Treatment related side effects   Signs / Symptoms 19 lbs weight loss past 3 months (10% weight loss)     Follow up with patient as he begins C#1 Opdivo.  Patient with continued gradual weight loss; states no appetite and \"just doesn't want to eat\".  Reviewed current nutritional intake with suggestions and recommendations for attaining optimal nutritional intake and meeting goals to prevent additional weight loss.  Encouraged \"grazing\" every 1-2 hours; suggestions for easy to keep on hand foods to snack on reviewed.      Encouraged switching ONS to Ensure Complete to maximize intake; he is currently drinking 2 Ensure Max per day.  Tips for flavor and nutrient enhancement reviewed.    Will follow.  "

## 2024-04-10 ENCOUNTER — OFFICE VISIT (OUTPATIENT)
Dept: ONCOLOGY | Facility: CLINIC | Age: 74
End: 2024-04-10
Payer: MEDICARE

## 2024-04-10 ENCOUNTER — LAB (OUTPATIENT)
Dept: LAB | Facility: HOSPITAL | Age: 74
End: 2024-04-10
Payer: MEDICARE

## 2024-04-10 VITALS
OXYGEN SATURATION: 99 % | BODY MASS INDEX: 23.84 KG/M2 | HEART RATE: 82 BPM | DIASTOLIC BLOOD PRESSURE: 75 MMHG | WEIGHT: 176 LBS | SYSTOLIC BLOOD PRESSURE: 125 MMHG | TEMPERATURE: 97.9 F | HEIGHT: 72 IN

## 2024-04-10 DIAGNOSIS — Z51.12 ENCOUNTER FOR ANTINEOPLASTIC IMMUNOTHERAPY: ICD-10-CM

## 2024-04-10 DIAGNOSIS — C34.31 MALIGNANT NEOPLASM OF LOWER LOBE OF RIGHT LUNG: Primary | ICD-10-CM

## 2024-04-10 DIAGNOSIS — C34.31 MALIGNANT NEOPLASM OF LOWER LOBE OF RIGHT LUNG: ICD-10-CM

## 2024-04-10 LAB
ALBUMIN SERPL-MCNC: 4.1 G/DL (ref 3.5–5.2)
ALP SERPL-CCNC: 95 U/L (ref 39–117)
ALT SERPL W P-5'-P-CCNC: 19 U/L (ref 1–41)
ANION GAP SERPL CALCULATED.3IONS-SCNC: 10 MMOL/L (ref 5–15)
AST SERPL-CCNC: 24 U/L (ref 1–40)
BASOPHILS # BLD AUTO: 0.03 10*3/MM3 (ref 0–0.2)
BASOPHILS NFR BLD AUTO: 0.2 % (ref 0–1.5)
BILIRUB CONJ SERPL-MCNC: <0.2 MG/DL (ref 0–0.3)
BILIRUB INDIRECT SERPL-MCNC: ABNORMAL MG/DL
BILIRUB SERPL-MCNC: 0.2 MG/DL (ref 0–1.2)
BUN SERPL-MCNC: 17 MG/DL (ref 8–23)
BUN/CREAT SERPL: 11 (ref 7–25)
CALCIUM SPEC-SCNC: 9.7 MG/DL (ref 8.6–10.5)
CHLORIDE SERPL-SCNC: 104 MMOL/L (ref 98–107)
CO2 SERPL-SCNC: 27 MMOL/L (ref 22–29)
CREAT SERPL-MCNC: 1.55 MG/DL (ref 0.76–1.27)
DEPRECATED RDW RBC AUTO: 51.9 FL (ref 37–54)
EGFRCR SERPLBLD CKD-EPI 2021: 46.4 ML/MIN/1.73
EOSINOPHIL # BLD AUTO: 0.18 10*3/MM3 (ref 0–0.4)
EOSINOPHIL NFR BLD AUTO: 1.3 % (ref 0.3–6.2)
ERYTHROCYTE [DISTWIDTH] IN BLOOD BY AUTOMATED COUNT: 14.5 % (ref 12.3–15.4)
GLUCOSE SERPL-MCNC: 110 MG/DL (ref 65–99)
HCT VFR BLD AUTO: 34.5 % (ref 37.5–51)
HGB BLD-MCNC: 11.2 G/DL (ref 13–17.7)
IMM GRANULOCYTES # BLD AUTO: 0.02 10*3/MM3 (ref 0–0.05)
IMM GRANULOCYTES NFR BLD AUTO: 0.1 % (ref 0–0.5)
LYMPHOCYTES # BLD AUTO: 1.92 10*3/MM3 (ref 0.7–3.1)
LYMPHOCYTES NFR BLD AUTO: 13.8 % (ref 19.6–45.3)
MCH RBC QN AUTO: 31.1 PG (ref 26.6–33)
MCHC RBC AUTO-ENTMCNC: 32.5 G/DL (ref 31.5–35.7)
MCV RBC AUTO: 95.8 FL (ref 79–97)
MONOCYTES # BLD AUTO: 1.04 10*3/MM3 (ref 0.1–0.9)
MONOCYTES NFR BLD AUTO: 7.5 % (ref 5–12)
NEUTROPHILS NFR BLD AUTO: 10.73 10*3/MM3 (ref 1.7–7)
NEUTROPHILS NFR BLD AUTO: 77.1 % (ref 42.7–76)
PLATELET # BLD AUTO: 396 10*3/MM3 (ref 140–450)
PMV BLD AUTO: 8.1 FL (ref 6–12)
POTASSIUM SERPL-SCNC: 4 MMOL/L (ref 3.5–5.2)
PROT SERPL-MCNC: 9 G/DL (ref 6–8.5)
RBC # BLD AUTO: 3.6 10*6/MM3 (ref 4.14–5.8)
SODIUM SERPL-SCNC: 141 MMOL/L (ref 136–145)
WBC NRBC COR # BLD AUTO: 13.92 10*3/MM3 (ref 3.4–10.8)

## 2024-04-10 PROCEDURE — 80048 BASIC METABOLIC PNL TOTAL CA: CPT

## 2024-04-10 PROCEDURE — 85025 COMPLETE CBC W/AUTO DIFF WBC: CPT

## 2024-04-10 PROCEDURE — 36415 COLL VENOUS BLD VENIPUNCTURE: CPT

## 2024-04-10 PROCEDURE — 80076 HEPATIC FUNCTION PANEL: CPT

## 2024-04-10 NOTE — PROGRESS NOTES
[facility]ology and [facility] [place]  Office number [phone]    Fax number [phone]     Follow up     Date: 04/10/24    Patient Name: David Barfield  MRN: [id number]  : 1950    Referring Physician: Dr. Sampson    Chief Complaint: Nonsmall cell lung cancer follow-up on treatment    Cancer Staging:  Cancer Staging   Stage IIIA (cT2b, cN2, cM0)    History of Present Illness: David Barfield is a pleasant 74 y.o. male who presents today for evaluation of NSCLC. He has a 50 pack year smoking history.    He has a history of a PET avid subpleural RLL lung nodule [facility] [place] [facility]. This had an SUV of 9.8 on PET  in association with increased mediastinal LN uptake with SUV around 3. Imaging was felt secondary to osteophyte fibrosis. He did undergo bronchoscopy 2020 with negative cytology. The RLL lung nodule was not felt amenable to bronchoscopy biopsy.    CT lung screen 2023 showed:      PET CT showed mass in the RLL intensely SUV avid, max 17. Mediastinal LN not hypermetabolic above baseline.     Right lower lobe robotic transbronchial biopsy and cytology on 9/15/2023 showed benign findings. Cultures including AFT and fungal were negative.  Station 11L, Station 4L LN negative  Station 7 LN showed NSCLCa (positive for cytokeratin 7, p63, and TTF-1 with no significant staining for p40 or Napsin. The staining pattern raises the possibility of mixed adenocarcinoma and squamous cell carcinoma differentiation in this tumor). PDL1 negative.    Tempus negative for targetable mutations on liquid biopsy. QNS on tissue at diagnosis.    MRI brain was negative.    He has a history of sick sinus syndrome s/p PPM and moderate COPD. He describes only mild physical limitations from this. For example he recently walked 1.5 miles at [employer] [facility]. At home, he climbs 17 steps without issue. He does sit down with long activities.     Following neoadjuvant chemoimmunotherapy  he underwent lobectomy and mediastinal lymph node dissection on January 9, 2024.  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  Margins were negative.  Regional lymph nodes including 4R, 12 R, and 7 were negative as were 5 intralobar lymph nodes.    Treatment history:  Carbo, taxol, nivo, C1 10/24/23; C2 11/15/23; C3 11/21/23  Atezolizumab maintenance, cycle 1 2/6/2024; cycle 2 2/27/24: stopped for intolerance (nausea, malaise, poor QOL) but no severe immune events  Opdivo maintenance, cycle 1: 4/4/24     Interval history:   Mr. Barfield is here in the company of his significant other for follow-up.  He notes that [facility], [facility] indicated a personal history of non-Hodgkin's lymphoma and they are unsure why this is in his record.  He has never carried a diagnosis of non-Hodgkin's lymphoma and they asked me to request the records.  Today return to discuss PET/CT results and as follow-up of Opdivo cycle #1.  He experienced nausea, diarrhea, vomiting, and a low-grade fever starting 24 hours after his pet and it lasted through the weekend.  His symptoms had fully abated by the time that he presented for immunotherapy, but his creatinine was mildly elevated and he reports that he has not yet increased his p.o. intake.  He reports he tolerated Opdivo much better and his side effects are much improved.  He feels his quality of life is good.  His significant other reports he has had more energy and is even been working on projects again.  Unfortunately she has to undergo breast surgery tomorrow and they are both anxious regarding this.  He continues with right-sided rib pain.  This is stable since surgery.  They have questions regarding his PET/CT results.     Past Medical History:   Past Medical History:   Diagnosis Date    Abnormal ECG     Arrhythmia  2019    Asthma 2019    Emphysema, COPD    Bronchogenic cancer of right lung 10/04/2023    Diabetes mellitus Borderline    Emphysema/COPD     Erectile disorder     GERD (gastroesophageal reflux disease)     History of chemotherapy     Hyperlipidemia     Hypertension 2019    Lung nodule     Mumps     Mumps     Pruritus     after bath    Slow to wake up after anesthesia     Wears dentures     upper only    Wears hearing aid in both ears     usually only wears right   No personal history of myocardial infarction, cerebrovascular event, or venous thromboembolism.  No autoimmune diseases.   No prior cscope, mailed cologuard recently    Past Surgical History:   Past Surgical History:   Procedure Laterality Date    BONE BIOPSY      broken bone surgery in his face    BRONCHOSCOPY THORACOTOMY Right 1/9/2024    Procedure: THORACOTOMY FOR LOWER LOBECTOMY AND MEDISTINAL LYMPH NODE DISSECTION RIGHT;  Surgeon: Joey Patel MD;  Location: [facility];  Service: Cardiothoracic;  Laterality: Right;    BRONCHOSCOPY WITH ION ROBOTIC ASSIST N/A 09/15/2023    Procedure: BRONCHOSCOPY NAVIGATION WITH ENDOBRONCHIAL ULTRASOUND AND ION ROBOT;  Surgeon: Octaviano Sampson MD;  Location:  [facility];  Service: Robotics - Pulmonary;  Laterality: N/A;  ion #6 - [id number]  - 0015  Cath guide 0061    EBUS balloon removed and intact    CARDIAC ELECTROPHYSIOLOGY PROCEDURE N/A 08/17/2021    Procedure: Pacemaker DC new;  Surgeon: Kayy Box MD;  Location:  [facility];  Service: Cardiology;  Laterality: N/A;    FACIAL FRACTURE SURGERY      PACEMAKER IMPLANTATION       Family History:   Family History   Problem Relation Age of Onset    Aneurysm Mother         brain    Dementia Father     Leukemia Sister     Heart disease Paternal Grandmother     Hypertension Paternal Grandfather    Sister leukemia at age 21  No other malignancy    Social History:   Social History     Socioeconomic History    Marital status:  Significant Other    Number of children: 3   Tobacco Use    Smoking status: Former     Current packs/day: 1.00     Average packs/day: 1 pack/day for 56.3 years (56.3 ttl pk-yrs)     Types: Cigarettes     Start date: 1/1/1968    Smokeless tobacco: Never    Tobacco comments:     Pt states that he quit smoking on 1/8/24. The day before his sx.    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 port, Disp: , Rfl:     cefdinir (OMNICEF) 300 MG capsule, Take 1 capsule by mouth 2 (Two) Times a Day., Disp: 10 capsule, Rfl: 0    fluticasone (VERAMYST) 27.5 MCG/SPRAY nasal spray, 2 sprays into the nostril(s) as directed by provider 1 (One) Time As Needed for Rhinitis or Allergies., Disp: 6 mL, Rfl: 2    Fluticasone-Umeclidin-Vilant (Trelegy Ellipta) 100-62.5-25 MCG/ACT inhaler, Inhale 1 puff Daily., Disp: 3 each, Rfl: 3    HYDROcodone-acetaminophen (Norco) 7.5-325 MG per tablet, Take 1 tablet by mouth Every 6 (Six) Hours As Needed for Moderate Pain., Disp: 60 tablet, Rfl: 0    lidocaine-prilocaine (EMLA) 2.5-2.5 % cream, Apply 1 application  topically to the appropriate area as directed As Needed (45-60 minutes prior to port access.  Cover with saran/plastic wrap.)., Disp: 30 g, Rfl: 3    lisinopril (PRINIVIL,ZESTRIL) 2.5 MG tablet, Take 1 tablet by mouth As Needed (elevated blood pressure). Take 1/2 tablet po prn (Patient taking differently: Take 1 tablet by mouth As Needed (elevated blood pressure systolic  "over 140). Take 1/2 tablet po prn), Disp: 30 tablet, Rfl: 1    omeprazole (priLOSEC) 40 MG capsule, Take 1 capsule by mouth Daily., Disp: 30 capsule, Rfl: 5    ondansetron (ZOFRAN) 8 MG tablet, Take 1 tablet by mouth 3 (Three) Times a Day As Needed for Nausea or Vomiting., Disp: 30 tablet, Rfl: 2    pravastatin (PRAVACHOL) 80 MG tablet, Take 1 tablet by mouth Every Night., Disp: 30 tablet, Rfl: 11    prochlorperazine (COMPAZINE) 10 MG tablet, Take 0.5 tablets by mouth Every 6 (Six) Hours As Needed for Nausea., Disp: 30 tablet, Rfl: 1    sildenafil (VIAGRA) 100 MG tablet, Take 0.5 tablets by mouth Daily As Needed for Erectile Dysfunction., Disp: , Rfl:     tamsulosin (FLOMAX) 0.4 MG capsule 24 hr capsule, Take 1 capsule by mouth Daily., Disp: 30 capsule, Rfl: 1    Allergies:   Allergies   Allergen Reactions    Cymbalta [Duloxetine Hcl] GI Intolerance    Gabapentin Mental Status Change     Pt states that this medication \"makes him feel foolish in his head\".     Remeron [Mirtazapine] Other (See Comments)     Excess sedation    Toradol [Ketorolac Tromethamine] GI Intolerance     Projectile vomiting     Latex Other (See Comments)     Latex allergy     Tape Rash       Objective     Vital Signs:   Vitals:    04/10/24 1139   BP: 125/75   Pulse: 82   Temp: 97.9 °F (36.6 °C)   TempSrc: Temporal   SpO2: 99%   Weight: 79.8 kg (176 lb)   Height: 182.9 cm (72.01\")   PainSc: 0-No pain    Body mass index is 23.86 kg/m².   Pain Score    04/10/24 1139   PainSc: 0-No pain       ECOG Performance Status: 1 - Symptomatic but completely ambulatory    Physical Exam:   General: No acute distress.   HEENT: Normocephalic, atraumatic. Sclera anicteric.   Neck: supple, no adenopathy.   Cardiovascular: RRR no murmurs  Respiratory: Clear to auscultation bilaterally.  Abdomen: Soft, nontender, non distended with normoactive bowel sounds  Lymph: no cervical, supraclavicular or axillary adenopathy  Neuro: Alert and oriented x 3. No focal deficits. "   Ext: Symmetric, no swelling.   Accurate as of 4/10/2024    Laboratory/Imaging Reviewed:   Hospital Outpatient Visit on 04/04/2024   Component Date Value Ref Range Status    Glucose 04/04/2024 111 (H)  65 - 99 mg/dL Final    BUN 04/04/2024 27 (H)  8 - 23 mg/dL Final    Creatinine 04/04/2024 2.02 (H)  0.76 - 1.27 mg/dL Final    Sodium 04/04/2024 132 (L)  136 - 145 mmol/L Final    Potassium 04/04/2024 3.6  3.5 - 5.2 mmol/L Final    Chloride 04/04/2024 98  98 - 107 mmol/L Final    CO2 04/04/2024 26.0  22.0 - 29.0 mmol/L Final    Calcium 04/04/2024 9.8  8.6 - 10.5 mg/dL Final    Total Protein 04/04/2024 7.7  6.0 - 8.5 g/dL Final    Albumin 04/04/2024 3.4 (L)  3.5 - 5.2 g/dL Final    ALT (SGPT) 04/04/2024 40  1 - 41 U/L Final    AST (SGOT) 04/04/2024 35  1 - 40 U/L Final    Alkaline Phosphatase 04/04/2024 93  39 - 117 U/L Final    Total Bilirubin 04/04/2024 <0.2  0.0 - 1.2 mg/dL Final    Globulin 04/04/2024 4.3  gm/dL Final    Calculated Result    A/G Ratio 04/04/2024 0.8  g/dL Final    BUN/Creatinine Ratio 04/04/2024 13.4  7.0 - 25.0 Final    Anion Gap 04/04/2024 8.0  5.0 - 15.0 mmol/L Final    eGFR 04/04/2024 33.8 (L)  >60.0 mL/min/1.73 Final    TSH 04/04/2024 1.370  0.270 - 4.200 uIU/mL Final    Free T4 04/04/2024 0.98  0.92 - 1.68 ng/dL Final    WBC 04/04/2024 12.62 (H)  3.40 - 10.80 10*3/mm3 Final    RBC 04/04/2024 3.40 (L)  4.14 - 5.80 10*6/mm3 Final    Hemoglobin 04/04/2024 10.5 (L)  13.0 - 17.7 g/dL Final    Hematocrit 04/04/2024 32.7 (L)  37.5 - 51.0 % Final    MCV 04/04/2024 96.2  79.0 - 97.0 fL Final    MCH 04/04/2024 30.9  26.6 - 33.0 pg Final    MCHC 04/04/2024 32.1  31.5 - 35.7 g/dL Final    RDW 04/04/2024 14.7  12.3 - 15.4 % Final    RDW-SD 04/04/2024 53.1  37.0 - 54.0 fl Final    MPV 04/04/2024 8.4  6.0 - 12.0 fL Final    Platelets 04/04/2024 276  140 - 450 10*3/mm3 Final    Neutrophil % 04/04/2024 76.4 (H)  42.7 - 76.0 % Final    Lymphocyte % 04/04/2024 12.4 (L)  19.6 - 45.3 % Final    Monocyte %  04/04/2024 9.4  5.0 - 12.0 % Final    Eosinophil % 04/04/2024 1.1  0.3 - 6.2 % Final    Basophil % 04/04/2024 0.2  0.0 - 1.5 % Final    Immature Grans % 04/04/2024 0.5  0.0 - 0.5 % Final    Neutrophils, Absolute 04/04/2024 9.65 (H)  1.70 - 7.00 10*3/mm3 Final    Lymphocytes, Absolute 04/04/2024 1.57  0.70 - 3.10 10*3/mm3 Final    Monocytes, Absolute 04/04/2024 1.18 (H)  0.10 - 0.90 10*3/mm3 Final    Eosinophils, Absolute 04/04/2024 0.14  0.00 - 0.40 10*3/mm3 Final    Basophils, Absolute 04/04/2024 0.02  0.00 - 0.20 10*3/mm3 Final    Immature Grans, Absolute 04/04/2024 0.06 (H)  0.00 - 0.05 10*3/mm3 Final   Hospital Outpatient Visit on 03/28/2024   Component Date Value Ref Range Status    Glucose 03/28/2024 97  70 - 130 mg/dL Final     Component      Latest Ref Rng 4/10/2024   WBC      3.40 - 10.80 10*3/mm3 13.92 (H)    RBC      4.14 - 5.80 10*6/mm3 3.60 (L)    Hemoglobin      13.0 - 17.7 g/dL 11.2 (L)    Hematocrit      37.5 - 51.0 % 34.5 (L)    MCV      79.0 - 97.0 fL 95.8    MCH      26.6 - 33.0 pg 31.1    MCHC      31.5 - 35.7 g/dL 32.5    RDW      12.3 - 15.4 % 14.5    RDW-SD      37.0 - 54.0 fl 51.9    MPV      6.0 - 12.0 fL 8.1    Platelets      140 - 450 10*3/mm3 396    Neutrophil Rel %      42.7 - 76.0 % 77.1 (H)    Lymphocyte Rel %      19.6 - 45.3 % 13.8 (L)    Monocyte Rel %      5.0 - 12.0 % 7.5    Eosinophil Rel %      0.3 - 6.2 % 1.3    Basophil Rel %      0.0 - 1.5 % 0.2    Immature Granulocyte Rel %      0.0 - 0.5 % 0.1    Neutrophils Absolute      1.70 - 7.00 10*3/mm3 10.73 (H)    Lymphocytes Absolute      0.70 - 3.10 10*3/mm3 1.92    Monocytes Absolute      0.10 - 0.90 10*3/mm3 1.04 (H)    Eosinophils Absolute      0.00 - 0.40 10*3/mm3 0.18    Basophils Absolute      0.00 - 0.20 10*3/mm3 0.03    Immature Grans, Absolute      0.00 - 0.05 10*3/mm3 0.02    Glucose      65 - 99 mg/dL 110 (H)    BUN      8 - 23 mg/dL 17    Creatinine      0.76 - 1.27 mg/dL 1.55 (H)    Sodium      136 - 145 mmol/L 141     Potassium      3.5 - 5.2 mmol/L 4.0    Chloride      98 - 107 mmol/L 104    CO2      22.0 - 29.0 mmol/L 27.0    Calcium      8.6 - 10.5 mg/dL 9.7    BUN/Creatinine Ratio      7.0 - 25.0  11.0    Anion Gap      5.0 - 15.0 mmol/L 10.0    eGFR      >60.0 mL/min/1.73 46.4 (L)    Total Protein      6.0 - 8.5 g/dL 9.0 (H)    Albumin      3.5 - 5.2 g/dL 4.1    ALT (SGPT)      1 - 41 U/L 19    AST (SGOT)      1 - 40 U/L 24    Alkaline Phosphatase      39 - 117 U/L 95    Total Bilirubin      0.0 - 1.2 mg/dL 0.2    Bilirubin, Direct      0.0 - 0.3 mg/dL <0.2      NM PET/CT Skull Base to Mid Thigh    Result Date: 3/29/2024  Narrative: NM PET/CT SKULL BASE TO MID THIGH Date of Exam: 3/28/2024 11:45 AM EDT Indication: lung cancer. Comparison: None available. Technique: 13.05 mCi of F-18 FDG was administered intravenously. PET imaging was obtained from skull base to mid-thigh approximately 60 minutes after radiotracer injection. A low dose non contrast CT was obtained for attenuation correction and anatomic localization. Fused PET-CT and 3D MIP reconstructions were utilized for image interpretation.  Fasting blood glucose level: 97 mg/dl. Reference uptake values: Mediastinum: 2.4 SUVmax Liver: 3.0 SUVmax Normalization method: Body Weight Findings: Head and neck: There is a 1 cm right supraclavicular lymph node with mild increased FDG activity and an SUV maximum of 3.1. Chest: Prominent right paratracheal lymph nodes with an SUV maximum of 3.5. There is mildly prominent left paratracheal lymph nodes with an SUV max of 3.1. Nonenlarged hilar lymph nodes are identified. Postsurgical changes in the right chest there is interval development of a loculated right effusion. There are changes of emphysema. Abdomen and pelvis: No abnormal FDG activity identified. Musculoskeletal: Postsurgical changes within the right chest. There are fractures of the fifth and sixth right lateral ribs, likely from recent surgery. There is mild uptake  within the soft tissues of the right back. No aggressive appearing lytic or sclerotic bone lesions.     Impression: Impression: 1. Slight increase in prominence of mediastinal and right supraclavicular lymph nodes with mild activity.. This could be reactive from recent surgery. Recommend attention on follow-up imaging. The right supraclavicular lymph nodes may be amenable to percutaneous ultrasound-guided biopsy if needed. Electronically Signed: Micha Millan MD  3/29/2024 4:46 PM EDT  Workstation ID: [id number]     Procedures    Assessment / Plan      Assessment/Plan:     Nonsmall cell lung cancer of the RLL, Stage IIIA  Immunotherapy supervision  -He has an enlarging RLL nodule with SUV of 17. Despite negative transbronchial biopsy, he was found to have N2 disease with a positive level 7 LN. We discussed options for definitive treatment to include induction chemo immunotherapy followed by surgical resection, chemoradiation followed by surgical resection, or definitive chemoradiation. We discussed differences in schedules and side effects. He has no contraindications to immunotherapy and I recommended nivolumab + chemotherapy induction.  His case was reviewed at multidisciplinary tumor board including thoracic surgery.  He was felt to be a good candidate for neoadjuvant chemo immunotherapy followed by resection assessment.  He completed 3 cycles of  nivolumab, carboplatin, paclitaxel x3 cycles in a neoadjuvant fashion.  He underwent right lower lobectomy and lymph node dissection.  -Final pathology reviewed and shows 1.4 cm of residual disease with extensive treatment effect.  The previously biopsied lymph node was negative on mediastinal dissection. Because his original bronchoscopy specimen was QNS for Tempus tissue testing, I ordered repeat Tempus on the resection specimen to rule out any EGFR or ALK mutation.  There was no mutation on liquid biopsy.  -His case was reviewed at multidisciplinary tumor board and  consensus was to proceed with adjuvant immunotherapy without any indication for further chemotherapy or adjuvant radiation.  We proceeded with atezolizumab per the [id number] regimen as per NCCN guidelines. He has received 2 cycles of adjuvant immunotherapy with atezolizumab.  He reports substantial treatment related side effects.  He has not experienced any severe immunotherapy related adverse events that would necessitate cessation of all immunotherapy drugs.  However he has had intolerable side effects on atezolizumab including severe nausea and fatigue that last for a week and impair his quality of life, such that he does not feel he can continue with the atezolizumab treatments.  Because he had an excellent response to chemoimmunotherapy with the use of nivolumab, because he cannot continue the atezolizumab due to side effects, and because he did not have any severe immune related adverse events that would be considered a contraindication to further immunotherapy, I recommended that we proceed with adjuvant nivolumab.  In the Checkmate 77T regimen, [facility]; [phone]) patients received a 6-month course of adjuvant nivolumab after initial neoadjuvant nivolumab and platinum based chemotherapy, which is consistent with his neoadjuvant treatment to which he responded well.   -He is tolerating adjuvant Opdivo much better and I anticipate he will return in 3 weeks for cycle #2.  -Labs reviewed from last week and notable for mild ARACELI.  I suspect this may have been secondary to a viral illness that had resolved prior to Opdivo initiation, as he reports he was still not drinking as well as baseline.  Repeat labs today show improvement in his creatinine.  Mild leukocytosis persists.  -I personally reviewed his PET/CT together with the patient and his significant other.  There is a very low level activity in the paratracheal and supraclavicular lymph node that is very close to background mediastinal/hepatic  uptake.  Compared to his prior malignancy, the degree of FDG uptake is potentially reduced.  However we will need to continue to follow this on serial imaging.  Plan to continue immunotherapy maintenance for now.    3.  Reported documentation of non-Hodgkin's lymphoma on his outside hospital problem list  -I suspect this is erroneous as I am not aware of [facility] and neither the patient.  I [facility] to [facility].  I have requested his records at their request.    4.  Rib fracture  5.  Pain secondary #4  -He has Lortab available as needed.    6.  Intolerance of antidepressants  -Recommended consideration of pharmacodynamic testing before future antidepressant prescription.    7.  Anemia  -We reviewed recent labs including B12, folate, ferritin, iron profile, and SPEP which were normal.  -I suspect multifactorial in the setting of bone marrow suppression from prior chemotherapy, recent surgery, and 2 recent infections.  -I offered a bone marrow biopsy to further evaluate this, particularly if his hemoglobin would begin to trend downwards and remain less than 10, but could be pursued now if they are motivated.  As his blood counts are pending, he is comfortable with serial observation.    Follow-up   3 weeks.     Neetu Ashley MD  Hematology and Oncology     I have spent a total of 46 min on reviewing test results/preparing to see patient, counseling patient, performing medically appropriate exam and documenting clinical information in the electronic or other health record

## 2024-04-12 ENCOUNTER — APPOINTMENT (OUTPATIENT)
Dept: CT IMAGING | Facility: HOSPITAL | Age: 74
DRG: 871 | End: 2024-04-12
Payer: MEDICARE

## 2024-04-12 ENCOUNTER — APPOINTMENT (OUTPATIENT)
Dept: GENERAL RADIOLOGY | Facility: HOSPITAL | Age: 74
DRG: 871 | End: 2024-04-12
Payer: MEDICARE

## 2024-04-12 ENCOUNTER — HOSPITAL ENCOUNTER (INPATIENT)
Facility: HOSPITAL | Age: 74
LOS: 10 days | Discharge: HOME | DRG: 871 | End: 2024-04-22
Attending: EMERGENCY MEDICINE | Admitting: INTERNAL MEDICINE
Payer: MEDICARE

## 2024-04-12 DIAGNOSIS — R42 POSTURAL DIZZINESS WITH NEAR SYNCOPE: ICD-10-CM

## 2024-04-12 DIAGNOSIS — R19.7 NAUSEA VOMITING AND DIARRHEA: ICD-10-CM

## 2024-04-12 DIAGNOSIS — C34.91 BRONCHOGENIC CANCER OF RIGHT LUNG: ICD-10-CM

## 2024-04-12 DIAGNOSIS — D72.829 LEUKOCYTOSIS, UNSPECIFIED TYPE: ICD-10-CM

## 2024-04-12 DIAGNOSIS — Z86.79 HISTORY OF HYPERTENSION: ICD-10-CM

## 2024-04-12 DIAGNOSIS — Z86.39 HISTORY OF DIABETES MELLITUS: ICD-10-CM

## 2024-04-12 DIAGNOSIS — A41.9 SEPSIS WITH ACUTE RENAL FAILURE WITHOUT SEPTIC SHOCK, DUE TO UNSPECIFIED ORGANISM, UNSPECIFIED ACUTE RENAL FAILURE TYPE: Primary | ICD-10-CM

## 2024-04-12 DIAGNOSIS — N17.9 SEPSIS WITH ACUTE RENAL FAILURE WITHOUT SEPTIC SHOCK, DUE TO UNSPECIFIED ORGANISM, UNSPECIFIED ACUTE RENAL FAILURE TYPE: Primary | ICD-10-CM

## 2024-04-12 DIAGNOSIS — J43.9 HISTORY OF EMPHYSEMA: ICD-10-CM

## 2024-04-12 DIAGNOSIS — R65.20 SEPSIS WITH ACUTE RENAL FAILURE WITHOUT SEPTIC SHOCK, DUE TO UNSPECIFIED ORGANISM, UNSPECIFIED ACUTE RENAL FAILURE TYPE: Primary | ICD-10-CM

## 2024-04-12 DIAGNOSIS — R79.89 ELEVATED PROCALCITONIN: ICD-10-CM

## 2024-04-12 DIAGNOSIS — Z87.891 FORMER SMOKER: ICD-10-CM

## 2024-04-12 DIAGNOSIS — R53.1 GENERALIZED WEAKNESS: ICD-10-CM

## 2024-04-12 DIAGNOSIS — R55 POSTURAL DIZZINESS WITH NEAR SYNCOPE: ICD-10-CM

## 2024-04-12 DIAGNOSIS — R11.2 NAUSEA VOMITING AND DIARRHEA: ICD-10-CM

## 2024-04-12 DIAGNOSIS — E87.20 LACTIC ACIDOSIS: ICD-10-CM

## 2024-04-12 DIAGNOSIS — N12 PYELONEPHRITIS: ICD-10-CM

## 2024-04-12 DIAGNOSIS — D84.9 IMMUNOSUPPRESSION: ICD-10-CM

## 2024-04-12 PROBLEM — N10 ACUTE PYELONEPHRITIS: Status: ACTIVE | Noted: 2024-04-12

## 2024-04-12 PROBLEM — Z72.0 TOBACCO ABUSE: Status: ACTIVE | Noted: 2023-08-29

## 2024-04-12 LAB
ALBUMIN SERPL-MCNC: 3.6 G/DL (ref 3.5–5.2)
ALBUMIN/GLOB SERPL: 0.8 G/DL
ALP SERPL-CCNC: 81 U/L (ref 39–117)
ALT SERPL W P-5'-P-CCNC: 15 U/L (ref 1–41)
ANION GAP SERPL CALCULATED.3IONS-SCNC: 16 MMOL/L (ref 5–15)
AST SERPL-CCNC: 33 U/L (ref 1–40)
B PARAPERT DNA SPEC QL NAA+PROBE: NOT DETECTED
B PERT DNA SPEC QL NAA+PROBE: NOT DETECTED
BASOPHILS # BLD MANUAL: 0 10*3/MM3 (ref 0–0.2)
BASOPHILS NFR BLD MANUAL: 0 % (ref 0–1.5)
BILIRUB SERPL-MCNC: 0.4 MG/DL (ref 0–1.2)
BILIRUB UR QL STRIP: NEGATIVE
BUN SERPL-MCNC: 22 MG/DL (ref 8–23)
BUN/CREAT SERPL: 9.9 (ref 7–25)
C PNEUM DNA NPH QL NAA+NON-PROBE: NOT DETECTED
CALCIUM SPEC-SCNC: 9.2 MG/DL (ref 8.6–10.5)
CHLORIDE SERPL-SCNC: 105 MMOL/L (ref 98–107)
CLARITY UR: CLEAR
CO2 SERPL-SCNC: 21 MMOL/L (ref 22–29)
COLOR UR: YELLOW
CREAT SERPL-MCNC: 2.23 MG/DL (ref 0.76–1.27)
D-LACTATE SERPL-SCNC: 3.9 MMOL/L (ref 0.5–2)
D-LACTATE SERPL-SCNC: 6.7 MMOL/L (ref 0.5–2)
DEPRECATED RDW RBC AUTO: 52.6 FL (ref 37–54)
EGFRCR SERPLBLD CKD-EPI 2021: 30 ML/MIN/1.73
EOSINOPHIL # BLD MANUAL: 0 10*3/MM3 (ref 0–0.4)
EOSINOPHIL NFR BLD MANUAL: 0 % (ref 0.3–6.2)
ERYTHROCYTE [DISTWIDTH] IN BLOOD BY AUTOMATED COUNT: 14.6 % (ref 12.3–15.4)
FLUAV SUBTYP SPEC NAA+PROBE: NOT DETECTED
FLUBV RNA ISLT QL NAA+PROBE: NOT DETECTED
GLOBULIN UR ELPH-MCNC: 4.3 GM/DL
GLUCOSE SERPL-MCNC: 117 MG/DL (ref 65–99)
GLUCOSE UR STRIP-MCNC: NEGATIVE MG/DL
HADV DNA SPEC NAA+PROBE: NOT DETECTED
HCOV 229E RNA SPEC QL NAA+PROBE: NOT DETECTED
HCOV HKU1 RNA SPEC QL NAA+PROBE: NOT DETECTED
HCOV NL63 RNA SPEC QL NAA+PROBE: NOT DETECTED
HCOV OC43 RNA SPEC QL NAA+PROBE: NOT DETECTED
HCT VFR BLD AUTO: 40 % (ref 37.5–51)
HGB BLD-MCNC: 12.4 G/DL (ref 13–17.7)
HGB UR QL STRIP.AUTO: ABNORMAL
HMPV RNA NPH QL NAA+NON-PROBE: NOT DETECTED
HOLD SPECIMEN: NORMAL
HPIV1 RNA ISLT QL NAA+PROBE: NOT DETECTED
HPIV2 RNA SPEC QL NAA+PROBE: NOT DETECTED
HPIV3 RNA NPH QL NAA+PROBE: NOT DETECTED
HPIV4 P GENE NPH QL NAA+PROBE: NOT DETECTED
KETONES UR QL STRIP: NEGATIVE
LEUKOCYTE ESTERASE UR QL STRIP.AUTO: ABNORMAL
LIPASE SERPL-CCNC: 31 U/L (ref 13–60)
LYMPHOCYTES # BLD MANUAL: 0.73 10*3/MM3 (ref 0.7–3.1)
LYMPHOCYTES NFR BLD MANUAL: 3 % (ref 5–12)
M PNEUMO IGG SER IA-ACNC: NOT DETECTED
MAGNESIUM SERPL-MCNC: 1.7 MG/DL (ref 1.6–2.4)
MCH RBC QN AUTO: 30.2 PG (ref 26.6–33)
MCHC RBC AUTO-ENTMCNC: 31 G/DL (ref 31.5–35.7)
MCV RBC AUTO: 97.3 FL (ref 79–97)
MONOCYTES # BLD: 1.09 10*3/MM3 (ref 0.1–0.9)
NEUTROPHILS # BLD AUTO: 34.47 10*3/MM3 (ref 1.7–7)
NEUTROPHILS NFR BLD MANUAL: 95 % (ref 42.7–76)
NITRITE UR QL STRIP: NEGATIVE
PH UR STRIP.AUTO: 7 [PH] (ref 5–8)
PLAT MORPH BLD: NORMAL
PLATELET # BLD AUTO: 291 10*3/MM3 (ref 140–450)
PMV BLD AUTO: 8.5 FL (ref 6–12)
POTASSIUM SERPL-SCNC: 3.4 MMOL/L (ref 3.5–5.2)
PROCALCITONIN SERPL-MCNC: 4.88 NG/ML (ref 0–0.25)
PROT SERPL-MCNC: 7.9 G/DL (ref 6–8.5)
PROT UR QL STRIP: ABNORMAL
RBC # BLD AUTO: 4.11 10*6/MM3 (ref 4.14–5.8)
RBC MORPH BLD: NORMAL
RHINOVIRUS RNA SPEC NAA+PROBE: NOT DETECTED
RSV RNA NPH QL NAA+NON-PROBE: NOT DETECTED
SARS-COV-2 RNA NPH QL NAA+NON-PROBE: NOT DETECTED
SODIUM SERPL-SCNC: 142 MMOL/L (ref 136–145)
SP GR UR STRIP: 1.01 (ref 1–1.03)
TROPONIN T SERPL HS-MCNC: 35 NG/L
UROBILINOGEN UR QL STRIP: ABNORMAL
VARIANT LYMPHS NFR BLD MANUAL: 2 % (ref 19.6–45.3)
WBC MORPH BLD: NORMAL
WBC NRBC COR # BLD AUTO: 36.28 10*3/MM3 (ref 3.4–10.8)
WHOLE BLOOD HOLD COAG: NORMAL
WHOLE BLOOD HOLD SPECIMEN: NORMAL

## 2024-04-12 PROCEDURE — 85007 BL SMEAR W/DIFF WBC COUNT: CPT | Performed by: EMERGENCY MEDICINE

## 2024-04-12 PROCEDURE — 83605 ASSAY OF LACTIC ACID: CPT | Performed by: PHYSICIAN ASSISTANT

## 2024-04-12 PROCEDURE — 99223 1ST HOSP IP/OBS HIGH 75: CPT | Performed by: INTERNAL MEDICINE

## 2024-04-12 PROCEDURE — 87040 BLOOD CULTURE FOR BACTERIA: CPT | Performed by: PHYSICIAN ASSISTANT

## 2024-04-12 PROCEDURE — P9612 CATHETERIZE FOR URINE SPEC: HCPCS

## 2024-04-12 PROCEDURE — 0202U NFCT DS 22 TRGT SARS-COV-2: CPT | Performed by: PHYSICIAN ASSISTANT

## 2024-04-12 PROCEDURE — 87641 MR-STAPH DNA AMP PROBE: CPT

## 2024-04-12 PROCEDURE — 25010000002 PIPERACILLIN SOD-TAZOBACTAM PER 1 G: Performed by: PHYSICIAN ASSISTANT

## 2024-04-12 PROCEDURE — 83735 ASSAY OF MAGNESIUM: CPT | Performed by: EMERGENCY MEDICINE

## 2024-04-12 PROCEDURE — 71045 X-RAY EXAM CHEST 1 VIEW: CPT

## 2024-04-12 PROCEDURE — 36415 COLL VENOUS BLD VENIPUNCTURE: CPT

## 2024-04-12 PROCEDURE — 25010000002 ERTAPENEM PER 500 MG: Performed by: INTERNAL MEDICINE

## 2024-04-12 PROCEDURE — 87086 URINE CULTURE/COLONY COUNT: CPT | Performed by: EMERGENCY MEDICINE

## 2024-04-12 PROCEDURE — 93005 ELECTROCARDIOGRAM TRACING: CPT

## 2024-04-12 PROCEDURE — 83735 ASSAY OF MAGNESIUM: CPT | Performed by: INTERNAL MEDICINE

## 2024-04-12 PROCEDURE — 85025 COMPLETE CBC W/AUTO DIFF WBC: CPT | Performed by: EMERGENCY MEDICINE

## 2024-04-12 PROCEDURE — 93005 ELECTROCARDIOGRAM TRACING: CPT | Performed by: EMERGENCY MEDICINE

## 2024-04-12 PROCEDURE — 82533 TOTAL CORTISOL: CPT | Performed by: INTERNAL MEDICINE

## 2024-04-12 PROCEDURE — 99291 CRITICAL CARE FIRST HOUR: CPT

## 2024-04-12 PROCEDURE — 71250 CT THORAX DX C-: CPT

## 2024-04-12 PROCEDURE — 81001 URINALYSIS AUTO W/SCOPE: CPT | Performed by: EMERGENCY MEDICINE

## 2024-04-12 PROCEDURE — 83690 ASSAY OF LIPASE: CPT | Performed by: PHYSICIAN ASSISTANT

## 2024-04-12 PROCEDURE — 80053 COMPREHEN METABOLIC PANEL: CPT | Performed by: EMERGENCY MEDICINE

## 2024-04-12 PROCEDURE — 25010000002 ONDANSETRON PER 1 MG: Performed by: PHYSICIAN ASSISTANT

## 2024-04-12 PROCEDURE — 84145 PROCALCITONIN (PCT): CPT | Performed by: PHYSICIAN ASSISTANT

## 2024-04-12 PROCEDURE — 25810000003 SODIUM CHLORIDE 0.9 % SOLUTION: Performed by: PHYSICIAN ASSISTANT

## 2024-04-12 PROCEDURE — 84484 ASSAY OF TROPONIN QUANT: CPT | Performed by: EMERGENCY MEDICINE

## 2024-04-12 PROCEDURE — 25010000002 VANCOMYCIN HCL IN NACL 1.75-0.9 GM/500ML-% SOLUTION: Performed by: PHYSICIAN ASSISTANT

## 2024-04-12 PROCEDURE — 74176 CT ABD & PELVIS W/O CONTRAST: CPT

## 2024-04-12 RX ORDER — SODIUM CHLORIDE 0.9 % (FLUSH) 0.9 %
10 SYRINGE (ML) INJECTION AS NEEDED
Status: DISCONTINUED | OUTPATIENT
Start: 2024-04-12 | End: 2024-04-22 | Stop reason: HOSPADM

## 2024-04-12 RX ORDER — ONDANSETRON 2 MG/ML
4 INJECTION INTRAMUSCULAR; INTRAVENOUS EVERY 6 HOURS PRN
Status: DISCONTINUED | OUTPATIENT
Start: 2024-04-12 | End: 2024-04-21

## 2024-04-12 RX ORDER — SODIUM CHLORIDE 9 MG/ML
75 INJECTION, SOLUTION INTRAVENOUS CONTINUOUS
Status: DISCONTINUED | OUTPATIENT
Start: 2024-04-12 | End: 2024-04-13

## 2024-04-12 RX ORDER — VANCOMYCIN 1.75 GRAM/500 ML IN 0.9 % SODIUM CHLORIDE INTRAVENOUS
22 ONCE
Qty: 500 ML | Refills: 0 | Status: COMPLETED | OUTPATIENT
Start: 2024-04-12 | End: 2024-04-12

## 2024-04-12 RX ORDER — ONDANSETRON 2 MG/ML
4 INJECTION INTRAMUSCULAR; INTRAVENOUS ONCE
Status: COMPLETED | OUTPATIENT
Start: 2024-04-12 | End: 2024-04-12

## 2024-04-12 RX ADMIN — PIPERACILLIN AND TAZOBACTAM 3.38 G: 3; .375 INJECTION, POWDER, LYOPHILIZED, FOR SOLUTION INTRAVENOUS at 20:20

## 2024-04-12 RX ADMIN — SODIUM CHLORIDE 1000 ML: 9 INJECTION, SOLUTION INTRAVENOUS at 23:44

## 2024-04-12 RX ADMIN — SODIUM CHLORIDE 2340 ML: 9 INJECTION, SOLUTION INTRAVENOUS at 20:21

## 2024-04-12 RX ADMIN — ERTAPENEM 1000 MG: 1 INJECTION INTRAMUSCULAR; INTRAVENOUS at 23:44

## 2024-04-12 RX ADMIN — Medication 1750 MG: at 21:50

## 2024-04-12 RX ADMIN — ONDANSETRON 4 MG: 2 INJECTION INTRAMUSCULAR; INTRAVENOUS at 20:22

## 2024-04-12 NOTE — ED PROVIDER NOTES
Subjective   History of Present Illness  This is a 74-year-old male that presents the ER with his wife and daughter with sudden onset of nausea/vomiting and diarrhea this afternoon around 1400.  Patient reports some mild diffuse abdominal discomfort but says it is increased on the right side.  He has vomited 3 times and had 1 loose stool.  He also reports generalized weakness, postural dizziness and near syncope.  Patient denies any chest pain or shortness of breath.  After reviewing history, patient has past medical history of right lower lobe lung cancer, stage IV according to family, hyperlipidemia, essential hypertension with intermittent hypotension, PVCs, pacemaker placement, and centrilobular emphysema.  Patient follows with Dr. Gely Ashley as oncology.  He is undergoing immunotherapy and last received immunotherapy on 4/4/2024.  After reviewing epic, patient's last visit with Dr. Ashley, oncology, was 14 2024.  Patient has history of a PET and subpleural right lower lobe lung [facility] [place] [facility].  This had an SUV on 9.8 on PET 2020 in association with increased mediastinal lymphadenopathy uptake with SUV around 3.  Imaging was felt secondary to osteophyte fibrosis.  He did undergo bronchoscopy in June, 2020 with negative cytology.  The right lower lobe lung nodule was not felt amenable to bronchoscopy biopsy.  At his recent oncology appointment, patient had reported experiencing nausea, diarrhea, vomiting, and low-grade fever starting 24 hours after his PET scan and at life tested throughout the weekend.  Symptoms fully resolved by the time that he presented for immunotherapy.  At the recent visit, patient's wife had stated that patient was more energetic and even working on projects again.  Patient's last CT angiogram of the chest with contrast from 2/15/2024 showed postsurgical changes of right lower lobectomy with small/moderate volume right pleural effusion which  appears loculated and similar to mildly enlarged mediastinal lymph nodes.  Patient has healing right lateral fifth and sixth rib fractures likely related to recent thoracotomy.  Patient denies any syncopal episode today or fall injury.  He just feels weak and nauseated with mild abdominal discomfort.  He denies any dysuria, urgency, or frequency but he has had decreased urination with GI losses.    History provided by:  Patient, spouse and relative  GI Problem  The primary symptoms include fatigue, abdominal pain (mild right sided abdominal discomfort), nausea, vomiting (x 3 since 1400 today) and diarrhea (x 1 today). Primary symptoms do not include fever, hematochezia, dysuria or myalgias. The illness began today (GI sxs started at 1400 this afternoon).   The illness is also significant for anorexia. The illness does not include chills, bloating or back pain. Associated symptoms comments: Dizziness with standing and near syncope.. Associated medical issues comments: No previous abdominal surgeries. No ASA or chronic anticoagulation use. Immunocompromised with Stage 4 lung CA. Last immunotherapy was 4/4/24. Pt follows with Dr. Ashley, oncologist..       Review of Systems   Constitutional:  Positive for activity change, appetite change and fatigue. Negative for chills and fever.   HENT: Negative.  Negative for congestion, ear pain, postnasal drip, rhinorrhea, sinus pressure, sinus pain, sneezing and sore throat.    Respiratory:  Negative for cough and shortness of breath.         Bronchogenic cancer of the right lung status post right lower lobectomy and chronic right pleural effusion.  Patient following with Dr. Gely Ashley, oncology.  History of emphysema and former smoker.   Cardiovascular: Negative.  Negative for chest pain, palpitations and leg swelling.   Gastrointestinal:  Positive for abdominal pain (mild right sided abdominal discomfort), anorexia, diarrhea (x 1 today), nausea and vomiting (x 3 since 1400  "today). Negative for bloating and hematochezia.   Genitourinary: Negative.  Negative for dysuria, flank pain, frequency and urgency.   Musculoskeletal: Negative.  Negative for back pain and myalgias.   Skin: Negative.    Neurological:  Positive for dizziness (postural dizziness with near syncope) and weakness. Negative for syncope, facial asymmetry, speech difficulty and headaches.   All other systems reviewed and are negative.      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    Diabetes mellitus Borderline    Emphysema/COPD     Erectile disorder     GERD (gastroesophageal reflux disease)     History of chemotherapy     Hyperlipidemia     Hypertension 2019    Lung nodule     Mumps     Mumps     Pruritus     after bath    Slow to wake up after anesthesia     Wears dentures     upper only    Wears hearing aid in both ears     usually only wears right       Allergies   Allergen Reactions    Cymbalta [Duloxetine Hcl] GI Intolerance    Gabapentin Mental Status Change     Pt states that this medication \"makes him feel foolish in his head\".     Remeron [Mirtazapine] Other (See Comments)     Excess sedation    Toradol [Ketorolac Tromethamine] GI Intolerance     Projectile vomiting     Latex Other (See Comments)     Latex allergy     Tape Rash       Past Surgical History:   Procedure Laterality Date    BONE BIOPSY      broken bone surgery in his face    BRONCHOSCOPY THORACOTOMY Right 1/9/2024    Procedure: THORACOTOMY FOR LOWER LOBECTOMY AND MEDISTINAL LYMPH NODE DISSECTION RIGHT;  Surgeon: Joey Patel MD;  Location: [facility] OR;  Service: Cardiothoracic;  Laterality: Right;    BRONCHOSCOPY WITH ION ROBOTIC ASSIST N/A 09/15/2023    Procedure: BRONCHOSCOPY NAVIGATION WITH ENDOBRONCHIAL ULTRASOUND AND ION ROBOT;  Surgeon: Octaviano Sampson MD;  Location: [facility] ENDOSCOPY;  Service: Robotics - Pulmonary;  " Laterality: N/A;  ion #6 - 0032  - 0015  Cath guide 0061    EBUS balloon removed and intact    CARDIAC ELECTROPHYSIOLOGY PROCEDURE N/A 08/17/2021    Procedure: Pacemaker DC new;  Surgeon: Kayy Box MD;  Location[facility] CATH INVASIVE LOCATION;  Service: Cardiology;  Laterality: N/A;    FACIAL FRACTURE SURGERY      PACEMAKER IMPLANTATION         Family History   Problem Relation Age of Onset    Aneurysm Mother         brain    Dementia Father     Leukemia Sister     Heart disease Paternal Grandmother     Hypertension Paternal Grandfather        Social History     Socioeconomic History    Marital status: Significant Other    Number of children: 3   Tobacco Use    Smoking status: Former     Current packs/day: 1.00     Average packs/day: 1 pack/day for 56.3 years (56.3 ttl pk-yrs)     Types: Cigarettes     Start date: 1/1/1968    Smokeless tobacco: Never    Tobacco comments:     Pt states that he quit smoking on 1/8/24. The day before his sx.    Vaping Use    Vaping status: Never Used   Substance and Sexual Activity    Alcohol use: Never    Drug use: Never    Sexual activity: Defer     Partners: Female     Birth control/protection: None           Objective   Physical Exam  Vitals and nursing note reviewed.   Constitutional:       General: He is not in acute distress.     Appearance: Normal appearance. He is ill-appearing. He is not toxic-appearing or diaphoretic.      Comments: Patient appears significantly ill.  Generally weak.  No acute distress.   HENT:      Head: Normocephalic and atraumatic.      Nose: Nose normal. No congestion or rhinorrhea.      Mouth/Throat:      Mouth: Mucous membranes are dry.      Pharynx: Oropharynx is clear. No pharyngeal swelling, oropharyngeal exudate or posterior oropharyngeal erythema.      Comments: Oral mucous membranes are significantly dry  Eyes:      Extraocular Movements: Extraocular movements intact.      Conjunctiva/sclera: Conjunctivae normal.      Pupils:  Pupils are equal, round, and reactive to light.   Cardiovascular:      Rate and Rhythm: Normal rate and regular rhythm. No extrasystoles are present.     Pulses: Normal pulses.      Heart sounds: Normal heart sounds.      Comments: Regular rate and rhythm.  No ectopy.  No pedal edema to lower extremities  Pulmonary:      Effort: Pulmonary effort is normal. No tachypnea, accessory muscle usage or retractions.      Breath sounds: Decreased breath sounds present. No wheezing, rhonchi or rales.      Comments: No tachypnea, retractions, or accessory muscle use.  Globally diminished breath sounds secondary to COPD.  No wheezes, rhonchi, or rales  Abdominal:      General: Bowel sounds are normal.      Palpations: Abdomen is soft.      Tenderness: There is abdominal tenderness in the right upper quadrant and right lower quadrant. There is no right CVA tenderness, left CVA tenderness, guarding or rebound. Negative signs include Hagan's sign and McBurney's sign.      Comments: Abdomen soft without distention.  Active bowel sounds in all 4 quadrants.  Mild diffuse abdominal tenderness and increased tenderness to the right flank.  Negative Hagan sign and no significant tenderness to right lower quadrant.  Abdominal exam is benign and nonsurgical.   Musculoskeletal:         General: Normal range of motion.      Cervical back: Normal range of motion and neck supple.      Right lower leg: No edema.      Left lower leg: No edema.   Skin:     General: Skin is warm and dry.   Neurological:      General: No focal deficit present.      Mental Status: He is alert.         Critical Care    Performed by: Michelle Iyer PA-C  Authorized by: Ruy Fernandez DO    Critical care provider statement:     Critical care time (minutes):  45    Critical care time was exclusive of:  Separately billable procedures and treating other patients    Critical care was necessary to treat or prevent imminent or life-threatening deterioration of the following  conditions:  Sepsis    Critical care was time spent personally by me on the following activities:  Blood draw for specimens, development of treatment plan with patient or surrogate, discussions with primary provider, evaluation of patient's response to treatment, examination of patient, obtaining history from patient or surrogate, review of old charts, re-evaluation of patient's condition, pulse oximetry, ordering and review of radiographic studies, ordering and review of laboratory studies and ordering and performing treatments and interventions             ED Course  ED Course as of 04/12/24 2313 Fri Apr 12, 2024 1928 Patient appears weak and dehydrated.  He is hypotensive and tachycardic.  Sats are stable at 98% on room air.  CBC showed white blood cell count of 36,000.  2 days ago white blood cell count was 11,000 in epic.  Differential is in process.  I initiated sepsis fluid bolus per protocol and vancomycin and Zosyn for broad-spectrum coverage.  Patient is immunocompromised receiving immunotherapy for stage IV lung cancer.  Discussed the case and all diagnostic workup so far with Dr. Fernandez, ER attending physician.  He is going to see and evaluate the patient.  Patient has pacemaker in place.  I personally reviewed EKG with Dr. Fernandez and there does appear to be some ST depression in the lateral leads.  Patient denies any chest pain. [FC]   2148 CT of the chest and abdomen/pelvis without contrast revealed findings concerning for bilateral pyelonephritis.  Circumferential wall thickening of the urinary bladder which can be seen with cystitis.  No evidence of abscess formation, hydronephrosis, or other complication.  Small hiatal hernia.  Previously seen mildly prominent hypermetabolic mediastinal and right supraclavicular lymph nodes which are unchanged.  There are also additional chronic findings including prior surgical changes of the right lung and healing right lateral fifth and sixth rib fractures.   CBC shows white blood cell count of 36,002 days ago, white blood cell count was 11,000.  Differential shows 95% neutrophils.  Lactic acid was 6.7.  Procalcitonin is 4.88.  Respiratory PCR panel was completely negative.  Magnesium level is 1.7.  Chemistries reveal BUN and creatinine 22 and 2.23.  Last creatinine 2 days ago was 1.55.  LFTs are normal.  High-sensitivity troponin is 35.  Last blood pressure is 105/60.  Temp is 98.4.  Heart rate is in the 80s.  Patient receiving sepsis fluid bolus.  I updated patient and family on all results and need for admission and they are agreeable. [FC]   2309 Discussed admission with Dr. Mao, hospitalist.  She recommended in and out catheterization to completely empty patient's bladder.  Nursing staff performed this and patient had 150 mL of urine out.  Bladder scan after catheterization showed 0 mL residual.  Vital signs are still stable.  Hospitalist team is agreeable to admission. [FC]      ED Course User Index  [FC] Michelle Iyer, ANGELIC                                        Recent Results (from the past 24 hour(s))   ECG 12 Lead ED Triage Standing Order; Weak / Dizzy / AMS    Collection Time: 04/12/24  7:03 PM   Result Value Ref Range    QT Interval 356 ms    QTC Interval 472 ms   Comprehensive Metabolic Panel    Collection Time: 04/12/24  7:05 PM    Specimen: Blood   Result Value Ref Range    Glucose 117 (H) 65 - 99 mg/dL    BUN 22 8 - 23 mg/dL    Creatinine 2.23 (H) 0.76 - 1.27 mg/dL    Sodium 142 136 - 145 mmol/L    Potassium 3.4 (L) 3.5 - 5.2 mmol/L    Chloride 105 98 - 107 mmol/L    CO2 21.0 (L) 22.0 - 29.0 mmol/L    Calcium 9.2 8.6 - 10.5 mg/dL    Total Protein 7.9 6.0 - 8.5 g/dL    Albumin 3.6 3.5 - 5.2 g/dL    ALT (SGPT) 15 1 - 41 U/L    AST (SGOT) 33 1 - 40 U/L    Alkaline Phosphatase 81 39 - 117 U/L    Total Bilirubin 0.4 0.0 - 1.2 mg/dL    Globulin 4.3 gm/dL    A/G Ratio 0.8 g/dL    BUN/Creatinine Ratio 9.9 7.0 - 25.0    Anion Gap 16.0 (H) 5.0 - 15.0 mmol/L     eGFR 30.0 (L) >60.0 mL/min/1.73   Single High Sensitivity Troponin T    Collection Time: 04/12/24  7:05 PM    Specimen: Blood   Result Value Ref Range    HS Troponin T 35 (H) <22 ng/L   Magnesium    Collection Time: 04/12/24  7:05 PM    Specimen: Blood   Result Value Ref Range    Magnesium 1.7 1.6 - 2.4 mg/dL   Green Top (Gel)    Collection Time: 04/12/24  7:05 PM   Result Value Ref Range    Extra Tube Hold for add-ons.    Lavender Top    Collection Time: 04/12/24  7:05 PM   Result Value Ref Range    Extra Tube hold for add-on    Gold Top - SST    Collection Time: 04/12/24  7:05 PM   Result Value Ref Range    Extra Tube Hold for add-ons.    Light Blue Top    Collection Time: 04/12/24  7:05 PM   Result Value Ref Range    Extra Tube Hold for add-ons.    CBC Auto Differential    Collection Time: 04/12/24  7:05 PM    Specimen: Blood   Result Value Ref Range    WBC 36.28 (C) 3.40 - 10.80 10*3/mm3    RBC 4.11 (L) 4.14 - 5.80 10*6/mm3    Hemoglobin 12.4 (L) 13.0 - 17.7 g/dL    Hematocrit 40.0 37.5 - 51.0 %    MCV 97.3 (H) 79.0 - 97.0 fL    MCH 30.2 26.6 - 33.0 pg    MCHC 31.0 (L) 31.5 - 35.7 g/dL    RDW 14.6 12.3 - 15.4 %    RDW-SD 52.6 37.0 - 54.0 fl    MPV 8.5 6.0 - 12.0 fL    Platelets 291 140 - 450 10*3/mm3   Lactic Acid, Plasma    Collection Time: 04/12/24  7:05 PM    Specimen: Blood   Result Value Ref Range    Lactate 6.7 (C) 0.5 - 2.0 mmol/L   Procalcitonin    Collection Time: 04/12/24  7:05 PM    Specimen: Blood   Result Value Ref Range    Procalcitonin 4.88 (H) 0.00 - 0.25 ng/mL   Lipase    Collection Time: 04/12/24  7:05 PM    Specimen: Blood   Result Value Ref Range    Lipase 31 13 - 60 U/L   Manual Differential    Collection Time: 04/12/24  7:05 PM    Specimen: Blood   Result Value Ref Range    Neutrophil % 95.0 (H) 42.7 - 76.0 %    Lymphocyte % 2.0 (L) 19.6 - 45.3 %    Monocyte % 3.0 (L) 5.0 - 12.0 %    Eosinophil % 0.0 (L) 0.3 - 6.2 %    Basophil % 0.0 0.0 - 1.5 %    Neutrophils Absolute 34.47 (H) 1.70 - 7.00  10*3/mm3    Lymphocytes Absolute 0.73 0.70 - 3.10 10*3/mm3    Monocytes Absolute 1.09 (H) 0.10 - 0.90 10*3/mm3    Eosinophils Absolute 0.00 0.00 - 0.40 10*3/mm3    Basophils Absolute 0.00 0.00 - 0.20 10*3/mm3    RBC Morphology Normal Normal    WBC Morphology Normal Normal    Platelet Morphology Normal Normal   Respiratory Panel PCR w/COVID-19(SARS-CoV-2) [facility] In-House, NP Swab in UTM/VTM, 2 HR TAT - Swab, Nasopharynx    Collection Time: 04/12/24  8:10 PM    Specimen: Nasopharynx; Swab   Result Value Ref Range    ADENOVIRUS, PCR Not Detected Not Detected    Coronavirus 229E Not Detected Not Detected    Coronavirus HKU1 Not Detected Not Detected    Coronavirus NL63 Not Detected Not Detected    Coronavirus OC43 Not Detected Not Detected    COVID19 Not Detected Not Detected - Ref. Range    Human Metapneumovirus Not Detected Not Detected    Human Rhinovirus/Enterovirus Not Detected Not Detected    Influenza A PCR Not Detected Not Detected    Influenza B PCR Not Detected Not Detected    Parainfluenza Virus 1 Not Detected Not Detected    Parainfluenza Virus 2 Not Detected Not Detected    Parainfluenza Virus 3 Not Detected Not Detected    Parainfluenza Virus 4 Not Detected Not Detected    RSV, PCR Not Detected Not Detected    Bordetella pertussis pcr Not Detected Not Detected    Bordetella parapertussis PCR Not Detected Not Detected    Chlamydophila pneumoniae PCR Not Detected Not Detected    Mycoplasma pneumo by PCR Not Detected Not Detected     Note: In addition to lab results from this visit, the labs listed above may include labs taken at another facility or during a different encounter within the last 24 hours. Please correlate lab times with ED admission and discharge times for further clarification of the services performed during this visit.    CT Chest Without Contrast Diagnostic   Final Result   Impression:   Findings concerning for bilateral pyelonephritis. Circumferential wall thickening of  the urinary bladder which can be seen with cystitis. No evidence of abscess formation, hydronephrosis, or other complication.      Additional chronic findings as above including surgical changes of the right lung and healing right lateral fifth and sixth rib fractures.      Small hiatal hernia.      Previously seen mildly prominent hypermetabolic mediastinal and right supraclavicular lymph nodes are unchanged.                  Electronically Signed: Raulito Light MD     4/12/2024 8:22 PM EDT     Workstation ID: [id number]      CT Abdomen Pelvis Without Contrast   Final Result   Impression:   Findings concerning for bilateral pyelonephritis. Circumferential wall thickening of the urinary bladder which can be seen with cystitis. No evidence of abscess formation, hydronephrosis, or other complication.      Additional chronic findings as above including surgical changes of the right lung and healing right lateral fifth and sixth rib fractures.      Small hiatal hernia.      Previously seen mildly prominent hypermetabolic mediastinal and right supraclavicular lymph nodes are unchanged.                  Electronically Signed: Raulito Light MD     4/12/2024 8:22 PM EDT     Workstation ID: [id number]      XR Chest 1 View   Final Result   Impression:   No acute cardiopulmonary abnormality. Chronic findings as above.         Electronically Signed: Raulito Light MD     4/12/2024 7:26 PM EDT     Workstation ID: [id number]        Vitals:    04/12/24 2159 04/12/24 2200 04/12/24 2230 04/12/24 2254   BP:  104/56 109/60    Pulse: 71 72 82 82   Resp:       Temp:       TempSrc:       SpO2: 96% 96% 91% 99%   Weight:       Height:         Medications   sodium chloride 0.9 % flush 10 mL (has no administration in time range)   vancomycin IVPB 1750 mg in 0.9% Sodium Chloride (premix) 500 mL (1,750 mg Intravenous New Bag 4/12/24 2150)   ertapenem (INVanz) 1,000 mg in sodium chloride 0.9 % 100 mL MBP (has no administration in time range)   Pharmacy  to dose vancomycin (has no administration in time range)   sodium chloride 0.9 % bolus 2,340 mL (0 mL Intravenous Stopped 4/12/24 2157)   piperacillin-tazobactam (ZOSYN) 3.375 g IVPB in 100 mL NS MBP (CD) (0 g Intravenous Stopped 4/12/24 2148)   ondansetron (ZOFRAN) injection 4 mg (4 mg Intravenous Given 4/12/24 2022)     ECG/EMG Results (last 24 hours)       Procedure Component Value Units Date/Time    ECG 12 Lead ED Triage Standing Order; Weak / Dizzy / AMS [[id number]] Collected: 04/12/24 1903     Updated: 04/12/24 1903     QT Interval 356 ms      QTC Interval 472 ms     Narrative:      Test Reason : ED Triage Standing Order~  Blood Pressure :   */*   mmHG  Vent. Rate : 106 BPM     Atrial Rate : 106 BPM     P-R Int :  56 ms          QRS Dur :  90 ms      QT Int : 356 ms       P-R-T Axes :   *  35  92 degrees     QTc Int : 472 ms    Sinus tachycardia with short IN  Marked ST abnormality, possible inferior subendocardial injury  Abnormal ECG  When compared with ECG of 12-JAN-2024 09:34,  Sinus rhythm has replaced Electronic atrial pacemaker  ST now depressed in Inferior leads  ST now depressed in Anterolateral leads    Referred By: EDMD           Confirmed By:           ECG 12 Lead ED Triage Standing Order; Weak / Dizzy / AMS   Preliminary Result   Test Reason : ED Triage Standing Order~   Blood Pressure :   */*   mmHG   Vent. Rate : 106 BPM     Atrial Rate : 106 BPM      P-R Int :  56 ms          QRS Dur :  90 ms       QT Int : 356 ms       P-R-T Axes :   *  35  92 degrees      QTc Int : 472 ms      Sinus tachycardia with short IN   Marked ST abnormality, possible inferior subendocardial injury   Abnormal ECG   When compared with ECG of 12-JAN-2024 09:34,   Sinus rhythm has replaced Electronic atrial pacemaker   ST now depressed in Inferior leads   ST now depressed in Anterolateral leads      Referred By: EDMD           Confirmed By:                  Medical Decision Making  Amount and/or Complexity of Data  Reviewed  Labs: ordered.  Radiology: ordered.  ECG/medicine tests: ordered.    Risk  Prescription drug management.  Decision regarding hospitalization.        Final diagnoses:   Sepsis with acute renal failure without septic shock, due to unspecified organism, unspecified acute renal failure type   Pyelonephritis   Nausea vomiting and diarrhea   Leukocytosis, unspecified type   Lactic acidosis   Generalized weakness   Postural dizziness with near syncope   Bronchogenic cancer of right lung   Immunosuppression   Elevated procalcitonin   History of emphysema   History of diabetes mellitus   History of hypertension   Former smoker       ED Disposition  ED Disposition       ED Disposition   Decision to Admit    Condition   --    Comment   Level of Care: Telemetry [5]   Diagnosis: Sepsis [[id number]]   Admitting Physician: KIN MENCHACA [[id number]]   Attending Physician: KIN MENCHACA [[id number]]   Bed Request Comments: tele                 No follow-up provider specified.       Medication List      No changes were made to your prescriptions during this visit.            Michelle Iyer PA-C  04/12/24 3504

## 2024-04-12 NOTE — Clinical Note
Level of Care: Telemetry [5]   Diagnosis: Sepsis [[id number]]   Admitting Physician: KIN MENCHACA [[id number]]   Attending Physician: KIN MENCHACA [[id number]]   Isolate for COVID?: No [0]   Bed Request Comments: tele   Certification: I Certify That Inpatient Hospital Services Are Medically Necessary For Greater Than 2 Midnights

## 2024-04-13 PROBLEM — E87.6 HYPOKALEMIA: Status: ACTIVE | Noted: 2024-04-13

## 2024-04-13 PROBLEM — N17.9 AKI (ACUTE KIDNEY INJURY): Status: ACTIVE | Noted: 2024-04-13

## 2024-04-13 LAB
ALBUMIN SERPL-MCNC: 2.4 G/DL (ref 3.5–5.2)
ALBUMIN/GLOB SERPL: 0.8 G/DL
ALP SERPL-CCNC: 50 U/L (ref 39–117)
ALT SERPL W P-5'-P-CCNC: 10 U/L (ref 1–41)
ANION GAP SERPL CALCULATED.3IONS-SCNC: 11 MMOL/L (ref 5–15)
AST SERPL-CCNC: 21 U/L (ref 1–40)
BACTERIA UR QL AUTO: ABNORMAL /HPF
BASOPHILS # BLD AUTO: 0.12 10*3/MM3 (ref 0–0.2)
BASOPHILS NFR BLD AUTO: 0.3 % (ref 0–1.5)
BILIRUB SERPL-MCNC: 0.2 MG/DL (ref 0–1.2)
BUN SERPL-MCNC: 25 MG/DL (ref 8–23)
BUN/CREAT SERPL: 9.2 (ref 7–25)
CALCIUM SPEC-SCNC: 7.9 MG/DL (ref 8.6–10.5)
CHLORIDE SERPL-SCNC: 113 MMOL/L (ref 98–107)
CHLORIDE UR-SCNC: 61 MMOL/L
CK SERPL-CCNC: 33 U/L (ref 20–200)
CO2 SERPL-SCNC: 16 MMOL/L (ref 22–29)
CORTIS SERPL-MCNC: 30.42 MCG/DL
CREAT SERPL-MCNC: 2.73 MG/DL (ref 0.76–1.27)
CREAT UR-MCNC: 38.1 MG/DL
D-LACTATE SERPL-SCNC: 1.6 MMOL/L (ref 0.5–2)
D-LACTATE SERPL-SCNC: 2.1 MMOL/L (ref 0.5–2)
D-LACTATE SERPL-SCNC: 2.1 MMOL/L (ref 0.5–2)
D-LACTATE SERPL-SCNC: 2.2 MMOL/L (ref 0.5–2)
D-LACTATE SERPL-SCNC: 2.5 MMOL/L (ref 0.5–2)
DEPRECATED RDW RBC AUTO: 54.4 FL (ref 37–54)
EGFRCR SERPLBLD CKD-EPI 2021: 23.5 ML/MIN/1.73
EOSINOPHIL # BLD AUTO: 0 10*3/MM3 (ref 0–0.4)
EOSINOPHIL NFR BLD AUTO: 0 % (ref 0.3–6.2)
EOSINOPHIL SPEC QL MICRO: 0 % EOS/100 CELLS (ref 0–0)
ERYTHROCYTE [DISTWIDTH] IN BLOOD BY AUTOMATED COUNT: 14.9 % (ref 12.3–15.4)
GEN 5 2HR TROPONIN T REFLEX: 37 NG/L
GLOBULIN UR ELPH-MCNC: 3.1 GM/DL
GLUCOSE SERPL-MCNC: 104 MG/DL (ref 65–99)
HCT VFR BLD AUTO: 28.9 % (ref 37.5–51)
HGB BLD-MCNC: 9 G/DL (ref 13–17.7)
HYALINE CASTS UR QL AUTO: ABNORMAL /LPF
IMM GRANULOCYTES # BLD AUTO: 1.5 10*3/MM3 (ref 0–0.05)
IMM GRANULOCYTES NFR BLD AUTO: 3.3 % (ref 0–0.5)
LYMPHOCYTES # BLD AUTO: 0.64 10*3/MM3 (ref 0.7–3.1)
LYMPHOCYTES NFR BLD AUTO: 1.4 % (ref 19.6–45.3)
MAGNESIUM SERPL-MCNC: 1.3 MG/DL (ref 1.6–2.4)
MAGNESIUM SERPL-MCNC: 1.8 MG/DL (ref 1.6–2.4)
MCH RBC QN AUTO: 30.9 PG (ref 26.6–33)
MCHC RBC AUTO-ENTMCNC: 31.1 G/DL (ref 31.5–35.7)
MCV RBC AUTO: 99.3 FL (ref 79–97)
MONOCYTES # BLD AUTO: 1.67 10*3/MM3 (ref 0.1–0.9)
MONOCYTES NFR BLD AUTO: 3.6 % (ref 5–12)
MRSA DNA SPEC QL NAA+PROBE: NEGATIVE
NEUTROPHILS NFR BLD AUTO: 42.12 10*3/MM3 (ref 1.7–7)
NEUTROPHILS NFR BLD AUTO: 91.4 % (ref 42.7–76)
NRBC BLD AUTO-RTO: 0 /100 WBC (ref 0–0.2)
PHOSPHATE SERPL-MCNC: 1.7 MG/DL (ref 2.5–4.5)
PHOSPHATE SERPL-MCNC: 2.9 MG/DL (ref 2.5–4.5)
PLATELET # BLD AUTO: 185 10*3/MM3 (ref 140–450)
PMV BLD AUTO: 8.5 FL (ref 6–12)
POTASSIUM SERPL-SCNC: 4.5 MMOL/L (ref 3.5–5.2)
POTASSIUM SERPL-SCNC: 4.7 MMOL/L (ref 3.5–5.2)
POTASSIUM SERPL-SCNC: 5.7 MMOL/L (ref 3.5–5.2)
PROT SERPL-MCNC: 5.5 G/DL (ref 6–8.5)
RBC # BLD AUTO: 2.91 10*6/MM3 (ref 4.14–5.8)
RBC # UR STRIP: ABNORMAL /HPF
REF LAB TEST METHOD: ABNORMAL
SODIUM SERPL-SCNC: 140 MMOL/L (ref 136–145)
SODIUM UR-SCNC: 74 MMOL/L
SQUAMOUS #/AREA URNS HPF: ABNORMAL /HPF
TROPONIN T DELTA: 3 NG/L
TROPONIN T SERPL HS-MCNC: 34 NG/L
TSH SERPL DL<=0.05 MIU/L-ACNC: 1.15 UIU/ML (ref 0.27–4.2)
UUN 24H UR-MCNC: 149 MG/DL
WBC # UR STRIP: ABNORMAL /HPF
WBC NRBC COR # BLD AUTO: 46.05 10*3/MM3 (ref 3.4–10.8)

## 2024-04-13 PROCEDURE — 25010000002 MAGNESIUM SULFATE 2 GM/50ML SOLUTION: Performed by: INTERNAL MEDICINE

## 2024-04-13 PROCEDURE — 84484 ASSAY OF TROPONIN QUANT: CPT | Performed by: INTERNAL MEDICINE

## 2024-04-13 PROCEDURE — 84100 ASSAY OF PHOSPHORUS: CPT | Performed by: INTERNAL MEDICINE

## 2024-04-13 PROCEDURE — 25010000002 HEPARIN (PORCINE) PER 1000 UNITS: Performed by: INTERNAL MEDICINE

## 2024-04-13 PROCEDURE — 84132 ASSAY OF SERUM POTASSIUM: CPT | Performed by: STUDENT IN AN ORGANIZED HEALTH CARE EDUCATION/TRAINING PROGRAM

## 2024-04-13 PROCEDURE — 25810000003 SODIUM CHLORIDE 0.9 % SOLUTION: Performed by: INTERNAL MEDICINE

## 2024-04-13 PROCEDURE — 87205 SMEAR GRAM STAIN: CPT | Performed by: INTERNAL MEDICINE

## 2024-04-13 PROCEDURE — 94799 UNLISTED PULMONARY SVC/PX: CPT

## 2024-04-13 PROCEDURE — 82570 ASSAY OF URINE CREATININE: CPT | Performed by: INTERNAL MEDICINE

## 2024-04-13 PROCEDURE — 99232 SBSQ HOSP IP/OBS MODERATE 35: CPT | Performed by: STUDENT IN AN ORGANIZED HEALTH CARE EDUCATION/TRAINING PROGRAM

## 2024-04-13 PROCEDURE — 83605 ASSAY OF LACTIC ACID: CPT | Performed by: PHYSICIAN ASSISTANT

## 2024-04-13 PROCEDURE — 25810000003 LACTATED RINGERS SOLUTION: Performed by: STUDENT IN AN ORGANIZED HEALTH CARE EDUCATION/TRAINING PROGRAM

## 2024-04-13 PROCEDURE — 0 DEXTROSE 5 % SOLUTION 1,000 ML FLEX CONT: Performed by: INTERNAL MEDICINE

## 2024-04-13 PROCEDURE — 84443 ASSAY THYROID STIM HORMONE: CPT | Performed by: INTERNAL MEDICINE

## 2024-04-13 PROCEDURE — 80053 COMPREHEN METABOLIC PANEL: CPT | Performed by: STUDENT IN AN ORGANIZED HEALTH CARE EDUCATION/TRAINING PROGRAM

## 2024-04-13 PROCEDURE — 82550 ASSAY OF CK (CPK): CPT | Performed by: INTERNAL MEDICINE

## 2024-04-13 PROCEDURE — 84540 ASSAY OF URINE/UREA-N: CPT | Performed by: INTERNAL MEDICINE

## 2024-04-13 PROCEDURE — 85025 COMPLETE CBC W/AUTO DIFF WBC: CPT | Performed by: INTERNAL MEDICINE

## 2024-04-13 PROCEDURE — 83605 ASSAY OF LACTIC ACID: CPT | Performed by: STUDENT IN AN ORGANIZED HEALTH CARE EDUCATION/TRAINING PROGRAM

## 2024-04-13 PROCEDURE — 25010000002 CEFEPIME PER 500 MG: Performed by: INTERNAL MEDICINE

## 2024-04-13 PROCEDURE — 84132 ASSAY OF SERUM POTASSIUM: CPT | Performed by: INTERNAL MEDICINE

## 2024-04-13 PROCEDURE — 94761 N-INVAS EAR/PLS OXIMETRY MLT: CPT

## 2024-04-13 PROCEDURE — 94640 AIRWAY INHALATION TREATMENT: CPT

## 2024-04-13 PROCEDURE — 84300 ASSAY OF URINE SODIUM: CPT | Performed by: INTERNAL MEDICINE

## 2024-04-13 PROCEDURE — 83735 ASSAY OF MAGNESIUM: CPT | Performed by: INTERNAL MEDICINE

## 2024-04-13 PROCEDURE — 82436 ASSAY OF URINE CHLORIDE: CPT | Performed by: INTERNAL MEDICINE

## 2024-04-13 PROCEDURE — 25810000003 LACTATED RINGERS PER 1000 ML: Performed by: STUDENT IN AN ORGANIZED HEALTH CARE EDUCATION/TRAINING PROGRAM

## 2024-04-13 RX ORDER — VITAMIN B COMPLEX
1 CAPSULE ORAL DAILY
Status: ON HOLD | COMMUNITY

## 2024-04-13 RX ORDER — SODIUM CHLORIDE 0.9 % (FLUSH) 0.9 %
10 SYRINGE (ML) INJECTION EVERY 12 HOURS SCHEDULED
Status: DISCONTINUED | OUTPATIENT
Start: 2024-04-13 | End: 2024-04-22 | Stop reason: HOSPADM

## 2024-04-13 RX ORDER — ONDANSETRON 4 MG/1
4 TABLET, ORALLY DISINTEGRATING ORAL EVERY 6 HOURS PRN
Status: DISCONTINUED | OUTPATIENT
Start: 2024-04-13 | End: 2024-04-22 | Stop reason: HOSPADM

## 2024-04-13 RX ORDER — ACETAMINOPHEN 325 MG/1
650 TABLET ORAL EVERY 4 HOURS PRN
Status: DISCONTINUED | OUTPATIENT
Start: 2024-04-13 | End: 2024-04-22 | Stop reason: HOSPADM

## 2024-04-13 RX ORDER — HEPARIN SODIUM 5000 [USP'U]/ML
5000 INJECTION, SOLUTION INTRAVENOUS; SUBCUTANEOUS EVERY 8 HOURS SCHEDULED
Status: DISCONTINUED | OUTPATIENT
Start: 2024-04-13 | End: 2024-04-22 | Stop reason: HOSPADM

## 2024-04-13 RX ORDER — PROCHLORPERAZINE MALEATE 5 MG/1
5 TABLET ORAL EVERY 6 HOURS PRN
Status: DISCONTINUED | OUTPATIENT
Start: 2024-04-13 | End: 2024-04-22 | Stop reason: HOSPADM

## 2024-04-13 RX ORDER — MAGNESIUM SULFATE HEPTAHYDRATE 40 MG/ML
2 INJECTION, SOLUTION INTRAVENOUS
Status: COMPLETED | OUTPATIENT
Start: 2024-04-13 | End: 2024-04-13

## 2024-04-13 RX ORDER — SODIUM CHLORIDE, SODIUM LACTATE, POTASSIUM CHLORIDE, CALCIUM CHLORIDE 600; 310; 30; 20 MG/100ML; MG/100ML; MG/100ML; MG/100ML
100 INJECTION, SOLUTION INTRAVENOUS CONTINUOUS
Status: DISCONTINUED | OUTPATIENT
Start: 2024-04-13 | End: 2024-04-13

## 2024-04-13 RX ORDER — ACETAMINOPHEN 650 MG/1
650 SUPPOSITORY RECTAL EVERY 4 HOURS PRN
Status: DISCONTINUED | OUTPATIENT
Start: 2024-04-13 | End: 2024-04-22 | Stop reason: HOSPADM

## 2024-04-13 RX ORDER — ACETAMINOPHEN 160 MG/5ML
650 SOLUTION ORAL EVERY 4 HOURS PRN
Status: DISCONTINUED | OUTPATIENT
Start: 2024-04-13 | End: 2024-04-22 | Stop reason: HOSPADM

## 2024-04-13 RX ORDER — FERROUS SULFATE 325(65) MG
325 TABLET ORAL
Status: ON HOLD | COMMUNITY

## 2024-04-13 RX ORDER — POTASSIUM CHLORIDE 1.5 G/1.58G
40 POWDER, FOR SOLUTION ORAL EVERY 4 HOURS
Status: COMPLETED | OUTPATIENT
Start: 2024-04-13 | End: 2024-04-13

## 2024-04-13 RX ORDER — PRAVASTATIN SODIUM 40 MG
80 TABLET ORAL NIGHTLY
Status: DISCONTINUED | OUTPATIENT
Start: 2024-04-13 | End: 2024-04-22 | Stop reason: HOSPADM

## 2024-04-13 RX ORDER — SODIUM CHLORIDE 9 MG/ML
40 INJECTION, SOLUTION INTRAVENOUS AS NEEDED
Status: DISCONTINUED | OUTPATIENT
Start: 2024-04-13 | End: 2024-04-22 | Stop reason: HOSPADM

## 2024-04-13 RX ORDER — HYDROCODONE BITARTRATE AND ACETAMINOPHEN 7.5; 325 MG/1; MG/1
1 TABLET ORAL EVERY 6 HOURS PRN
Status: DISCONTINUED | OUTPATIENT
Start: 2024-04-13 | End: 2024-04-22

## 2024-04-13 RX ORDER — BUDESONIDE AND FORMOTEROL FUMARATE DIHYDRATE 160; 4.5 UG/1; UG/1
2 AEROSOL RESPIRATORY (INHALATION)
Status: DISCONTINUED | OUTPATIENT
Start: 2024-04-13 | End: 2024-04-22 | Stop reason: HOSPADM

## 2024-04-13 RX ORDER — TAMSULOSIN HYDROCHLORIDE 0.4 MG/1
0.4 CAPSULE ORAL DAILY
Status: DISCONTINUED | OUTPATIENT
Start: 2024-04-13 | End: 2024-04-22 | Stop reason: HOSPADM

## 2024-04-13 RX ORDER — PANTOPRAZOLE SODIUM 40 MG/1
40 TABLET, DELAYED RELEASE ORAL
Status: DISCONTINUED | OUTPATIENT
Start: 2024-04-13 | End: 2024-04-22

## 2024-04-13 RX ORDER — NICOTINE 21 MG/24HR
1 PATCH, TRANSDERMAL 24 HOURS TRANSDERMAL EVERY 24 HOURS
Status: DISCONTINUED | OUTPATIENT
Start: 2024-04-13 | End: 2024-04-22 | Stop reason: HOSPADM

## 2024-04-13 RX ORDER — SODIUM CHLORIDE 0.9 % (FLUSH) 0.9 %
10 SYRINGE (ML) INJECTION AS NEEDED
Status: DISCONTINUED | OUTPATIENT
Start: 2024-04-13 | End: 2024-04-22 | Stop reason: HOSPADM

## 2024-04-13 RX ORDER — ONDANSETRON 2 MG/ML
4 INJECTION INTRAMUSCULAR; INTRAVENOUS EVERY 6 HOURS PRN
Status: DISCONTINUED | OUTPATIENT
Start: 2024-04-13 | End: 2024-04-22 | Stop reason: HOSPADM

## 2024-04-13 RX ADMIN — ACETAMINOPHEN 650 MG: 325 TABLET ORAL at 22:12

## 2024-04-13 RX ADMIN — HEPARIN SODIUM 5000 UNITS: 5000 INJECTION INTRAVENOUS; SUBCUTANEOUS at 22:07

## 2024-04-13 RX ADMIN — MAGNESIUM SULFATE HEPTAHYDRATE 2 G: 2 INJECTION, SOLUTION INTRAVENOUS at 06:43

## 2024-04-13 RX ADMIN — PANTOPRAZOLE SODIUM 40 MG: 40 TABLET, DELAYED RELEASE ORAL at 05:32

## 2024-04-13 RX ADMIN — Medication 10 ML: at 22:07

## 2024-04-13 RX ADMIN — SODIUM CHLORIDE, POTASSIUM CHLORIDE, SODIUM LACTATE AND CALCIUM CHLORIDE 500 ML: 600; 310; 30; 20 INJECTION, SOLUTION INTRAVENOUS at 18:28

## 2024-04-13 RX ADMIN — BUDESONIDE AND FORMOTEROL FUMARATE DIHYDRATE 2 PUFF: 160; 4.5 AEROSOL RESPIRATORY (INHALATION) at 09:34

## 2024-04-13 RX ADMIN — MAGNESIUM SULFATE HEPTAHYDRATE 2 G: 2 INJECTION, SOLUTION INTRAVENOUS at 09:47

## 2024-04-13 RX ADMIN — POTASSIUM CHLORIDE 40 MEQ: 1.5 POWDER, FOR SOLUTION ORAL at 01:56

## 2024-04-13 RX ADMIN — TIOTROPIUM BROMIDE INHALATION SPRAY 2 PUFF: 3.12 SPRAY, METERED RESPIRATORY (INHALATION) at 09:34

## 2024-04-13 RX ADMIN — SODIUM CHLORIDE 1000 ML: 9 INJECTION, SOLUTION INTRAVENOUS at 00:24

## 2024-04-13 RX ADMIN — POTASSIUM & SODIUM PHOSPHATES POWDER PACK 280-160-250 MG 2 PACKET: 280-160-250 PACK at 04:31

## 2024-04-13 RX ADMIN — HYDROCODONE BITARTRATE AND ACETAMINOPHEN 1 TABLET: 7.5; 325 TABLET ORAL at 18:27

## 2024-04-13 RX ADMIN — SODIUM ZIRCONIUM CYCLOSILICATE 10 G: 10 POWDER, FOR SUSPENSION ORAL at 14:20

## 2024-04-13 RX ADMIN — TAMSULOSIN HYDROCHLORIDE 0.4 MG: 0.4 CAPSULE ORAL at 09:47

## 2024-04-13 RX ADMIN — SODIUM CHLORIDE, POTASSIUM CHLORIDE, SODIUM LACTATE AND CALCIUM CHLORIDE 100 ML/HR: 600; 310; 30; 20 INJECTION, SOLUTION INTRAVENOUS at 12:35

## 2024-04-13 RX ADMIN — MAGNESIUM SULFATE HEPTAHYDRATE 2 G: 2 INJECTION, SOLUTION INTRAVENOUS at 04:30

## 2024-04-13 RX ADMIN — PRAVASTATIN SODIUM 80 MG: 40 TABLET ORAL at 22:07

## 2024-04-13 RX ADMIN — BUDESONIDE AND FORMOTEROL FUMARATE DIHYDRATE 2 PUFF: 160; 4.5 AEROSOL RESPIRATORY (INHALATION) at 19:42

## 2024-04-13 RX ADMIN — HEPARIN SODIUM 5000 UNITS: 5000 INJECTION INTRAVENOUS; SUBCUTANEOUS at 14:20

## 2024-04-13 RX ADMIN — HEPARIN SODIUM 5000 UNITS: 5000 INJECTION INTRAVENOUS; SUBCUTANEOUS at 05:32

## 2024-04-13 RX ADMIN — SODIUM BICARBONATE 150 MEQ: 84 INJECTION, SOLUTION INTRAVENOUS at 14:20

## 2024-04-13 RX ADMIN — POTASSIUM CHLORIDE 40 MEQ: 1.5 POWDER, FOR SOLUTION ORAL at 05:33

## 2024-04-13 RX ADMIN — Medication 1 PATCH: at 05:33

## 2024-04-13 RX ADMIN — PRAVASTATIN SODIUM 80 MG: 40 TABLET ORAL at 01:56

## 2024-04-13 RX ADMIN — CEFEPIME 2000 MG: 2 INJECTION, POWDER, FOR SOLUTION INTRAVENOUS at 12:31

## 2024-04-13 RX ADMIN — SODIUM CHLORIDE 75 ML/HR: 9 INJECTION, SOLUTION INTRAVENOUS at 01:37

## 2024-04-13 NOTE — CONSULTS
Patient Care Team:  Shahid Drake MD as PCP - General (Family Medicine)  Octaviano Sampson MD as Consulting Physician (Pulmonary Disease)  Neetu Ashley MD as Referring Physician (Hematology and Oncology)  Nimo Rodríguez MD as Consulting Physician (Radiation Oncology)    Chief complaint: ARACELI   Sepsis   Pyelonephritis.     History of Present Illness: Patient is a 74 y.o. male, with PMH NSCLC/RLL lobectomy (1/2024) currently on Opdivo, HTN, emphysema, seen today for sepsis. Last Opdivo was last Thursday. Patient is  admitted at [facility] with acute onset flank pain, weakness, nausea, vomiting and diarrhea. Patient has been feeling unwell after recent cycle of immunotherapy. He has been getting intermittent diarrhea, worsening weakness and lack of appetite since he received taxel based chemotherapy. However he was later switched to Immunotherapy. On admission he was found to have acute kidney injury w creatinine ~ 2.2, wbc 36k, Lactate 2.2. CT on admission showed b/l perinephric stranding and circumferential bladder wall thickening. Patient was started on broad spectrum abx for possible UTI related sepsis. He barry using any NSAID recently. He does have hx of HTN and takes lisinopril at home. However lately he quit taking it as he wasn't feeling well.    Review of Systems   Constitutional:  Positive for fatigue.   HENT: Negative.     Respiratory: Negative.     Cardiovascular: Negative.    Gastrointestinal:  Positive for abdominal pain, diarrhea, nausea and vomiting.   Musculoskeletal: Negative.    Neurological: Negative.    Hematological: Negative.    Psychiatric/Behavioral: Negative.          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    Diabetes mellitus Borderline    Emphysema/COPD     Erectile disorder     GERD (gastroesophageal reflux disease)     History of chemotherapy     Hyperlipidemia      Hypertension 2019    Lung nodule     Mumps     Mumps     Pruritus     after bath    Slow to wake up after anesthesia     Wears dentures     upper only    Wears hearing aid in both ears     usually only wears right   ,   Past Surgical History:   Procedure Laterality Date    BONE BIOPSY      broken bone surgery in his face    BRONCHOSCOPY THORACOTOMY Right 1/9/2024    Procedure: THORACOTOMY FOR LOWER LOBECTOMY AND MEDISTINAL LYMPH NODE DISSECTION RIGHT;  Surgeon: Joey Patel MD;  Location:  [facility];  Service: Cardiothoracic;  Laterality: Right;    BRONCHOSCOPY WITH ION ROBOTIC ASSIST N/A 09/15/2023    Procedure: BRONCHOSCOPY NAVIGATION WITH ENDOBRONCHIAL ULTRASOUND AND ION ROBOT;  Surgeon: Octaviano Sampson MD;  Location:  [facility];  Service: Robotics - Pulmonary;  Laterality: N/A;  ion #6 - 0032  - 0015  Cath guide [id number]    EBUS balloon removed and intact    CARDIAC ELECTROPHYSIOLOGY PROCEDURE N/A 08/17/2021    Procedure: Pacemaker DC new;  Surgeon: Kayy Box MD;  Location:  [facility];  Service: Cardiology;  Laterality: N/A;    FACIAL FRACTURE SURGERY      PACEMAKER IMPLANTATION     ,   Family History   Problem Relation Age of Onset    Aneurysm Mother         brain    Dementia Father     Leukemia Sister     Heart disease Paternal Grandmother     Hypertension Paternal Grandfather    ,   Social History     Socioeconomic History    Marital status: Significant Other    Number of children: 3   Tobacco Use    Smoking status: Former     Current packs/day: 0.50     Average packs/day: 0.5 packs/day for 56.3 years (28.6 ttl pk-yrs)     Types: Cigarettes     Start date: 1/1/1968    Smokeless tobacco: Never    Tobacco comments:     Pt states that he quit smoking on 1/8/24. The day before his sx.    Vaping Use    Vaping status: Never Used   Substance and Sexual Activity    Alcohol use: Never    Drug use: Never    Sexual activity: Defer     Partners: Female     Birth  control/protection: None     E-cigarette/Vaping    E-cigarette/Vaping Use Never User      E-cigarette/Vaping Substances     E-cigarette/Vaping Devices         ,   Medications Prior to Admission   Medication Sig Dispense Refill Last Dose    albuterol sulfate  (90 Base) MCG/ACT inhaler Inhale 2 puffs Every 4 (Four) Hours As Needed for Wheezing. 18 g 11     B Complex Vitamins (vitamin b complex) capsule capsule Take  by mouth Daily.       cefdinir (OMNICEF) 300 MG capsule Take 1 capsule by mouth 2 (Two) Times a Day. 10 capsule 0     ferrous sulfate 325 (65 FE) MG tablet Take 1 tablet by mouth Daily With Breakfast.       fluticasone (VERAMYST) 27.5 MCG/SPRAY nasal spray 2 sprays into the nostril(s) as directed by provider 1 (One) Time As Needed for Rhinitis or Allergies. 6 mL 2     Fluticasone-Umeclidin-Vilant (Trelegy Ellipta) 100-62.5-25 MCG/ACT inhaler Inhale 1 puff Daily. 3 each 3     HYDROcodone-acetaminophen (Norco) 7.5-325 MG per tablet Take 1 tablet by mouth Every 6 (Six) Hours As Needed for Moderate Pain. 60 tablet 0     lidocaine-prilocaine (EMLA) 2.5-2.5 % cream Apply 1 application  topically to the appropriate area as directed As Needed (45-60 minutes prior to port access.  Cover with saran/plastic wrap.). 30 g 3     lisinopril (PRINIVIL,ZESTRIL) 2.5 MG tablet Take 1 tablet by mouth As Needed (elevated blood pressure). Take 1/2 tablet po prn (Patient taking differently: Take 1 tablet by mouth As Needed (elevated blood pressure systolic over 140). Take 1/2 tablet po prn) 30 tablet 1     Nivolumab (OPDIVO IV) Infuse  into a venous catheter. Unsurse of dose       omeprazole (priLOSEC) 40 MG capsule Take 1 capsule by mouth Daily. 30 capsule 5     ondansetron (ZOFRAN) 8 MG tablet Take 1 tablet by mouth 3 (Three) Times a Day As Needed for Nausea or Vomiting. 30 tablet 2     pravastatin (PRAVACHOL) 80 MG tablet Take 1 tablet by mouth Every Night. 30 tablet 11     sildenafil (VIAGRA) 100 MG tablet Take 0.5  tablets by mouth Daily As Needed for Erectile Dysfunction.      , and Scheduled Meds:  budesonide-formoterol, 2 puff, Inhalation, BID - RT   And  tiotropium bromide monohydrate, 2 puff, Inhalation, Daily - RT  [START ON 4/14/2024] cefepime, 2,000 mg, Intravenous, Daily  heparin (porcine), 5,000 Units, Subcutaneous, Q8H  lactated ringers, 500 mL, Intravenous, Once  nicotine, 1 patch, Transdermal, Q24H  [Held by provider] pantoprazole, 40 mg, Oral, Q AM  pravastatin, 80 mg, Oral, Nightly  sodium chloride, 10 mL, Intravenous, Q12H  tamsulosin, 0.4 mg, Oral, Daily       Objective     Vital Signs  Temp:  [97.6 °F (36.4 °C)-98.4 °F (36.9 °C)] 98.1 °F (36.7 °C)  Heart Rate:  [] 75  Resp:  [16-18] 16  BP: ()/(51-79) 122/67    No intake/output data recorded.  I/O last 3 completed shifts:  In: 700 [I.V.:700]  Out: 150 [Urine:150]    Physical Exam  Constitutional:       General: He is not in acute distress.     Appearance: Normal appearance. He is not ill-appearing.   HENT:      Head: Normocephalic.      Nose: Nose normal.      Mouth/Throat:      Mouth: Mucous membranes are moist.   Eyes:      Pupils: Pupils are equal, round, and reactive to light.   Cardiovascular:      Rate and Rhythm: Normal rate and regular rhythm.      Pulses: Normal pulses.   Pulmonary:      Effort: Pulmonary effort is normal.   Abdominal:      General: Abdomen is flat. There is no distension.      Tenderness: There is no abdominal tenderness.   Musculoskeletal:         General: Normal range of motion.      Right lower leg: No edema.      Left lower leg: No edema.   Skin:     General: Skin is warm.   Neurological:      General: No focal deficit present.      Mental Status: He is alert and oriented to person, place, and time.         Results Review:    I reviewed the patient's new clinical results.    WBC WBC   Date Value Ref Range Status   04/13/2024 46.05 (C) 3.40 - 10.80 10*3/mm3 Final   04/12/2024 36.28 (C) 3.40 - 10.80 10*3/mm3 Final     "  HGB Hemoglobin   Date Value Ref Range Status   04/13/2024 9.0 (L) 13.0 - 17.7 g/dL Final   04/12/2024 12.4 (L) 13.0 - 17.7 g/dL Final      HCT Hematocrit   Date Value Ref Range Status   04/13/2024 28.9 (L) 37.5 - 51.0 % Final   04/12/2024 40.0 37.5 - 51.0 % Final      Platlets No results found for: \"LABPLAT\"   MCV MCV   Date Value Ref Range Status   04/13/2024 99.3 (H) 79.0 - 97.0 fL Final   04/12/2024 97.3 (H) 79.0 - 97.0 fL Final          Sodium Sodium   Date Value Ref Range Status   04/13/2024 140 136 - 145 mmol/L Final   04/12/2024 142 136 - 145 mmol/L Final      Potassium Potassium   Date Value Ref Range Status   04/13/2024 5.7 (H) 3.5 - 5.2 mmol/L Final   04/13/2024 4.7 3.5 - 5.2 mmol/L Final   04/12/2024 3.4 (L) 3.5 - 5.2 mmol/L Final      Chloride Chloride   Date Value Ref Range Status   04/13/2024 113 (H) 98 - 107 mmol/L Final   04/12/2024 105 98 - 107 mmol/L Final      CO2 CO2   Date Value Ref Range Status   04/13/2024 16.0 (L) 22.0 - 29.0 mmol/L Final   04/12/2024 21.0 (L) 22.0 - 29.0 mmol/L Final      BUN BUN   Date Value Ref Range Status   04/13/2024 25 (H) 8 - 23 mg/dL Final   04/12/2024 22 8 - 23 mg/dL Final      Creatinine Creatinine   Date Value Ref Range Status   04/13/2024 2.73 (H) 0.76 - 1.27 mg/dL Final   04/12/2024 2.23 (H) 0.76 - 1.27 mg/dL Final      Calcium Calcium   Date Value Ref Range Status   04/13/2024 7.9 (L) 8.6 - 10.5 mg/dL Final   04/12/2024 9.2 8.6 - 10.5 mg/dL Final      PO4 No results found for: \"CAPO4\"   Albumin Albumin   Date Value Ref Range Status   04/13/2024 2.4 (L) 3.5 - 5.2 g/dL Final   04/12/2024 3.6 3.5 - 5.2 g/dL Final      Magnesium Magnesium   Date Value Ref Range Status   04/13/2024 1.3 (L) 1.6 - 2.4 mg/dL Final   04/12/2024 1.7 1.6 - 2.4 mg/dL Final   04/12/2024 1.8 1.6 - 2.4 mg/dL Final      Uric Acid No results found for: \"URICACID\"         Assessment & Plan       Sepsis    Presence of cardiac pacemaker    Mixed hyperlipidemia    Centrilobular emphysema    " Tobacco abuse    Primary hypertension    Acute pyelonephritis    ARACELI (acute kidney injury)    Hypokalemia      Assessment & Plan    Acute kidney injury:  Baseline cr ~ 0.8mg/dl. Cr on this admission 2.2-2.7mg/dl. UA large blood, trace protein, large aspen esterase many wbc++. CT showed perinephric fat stranding consistent with pyelonephritis     Hyperkalemia: IN the setting of ARACELI and transcellular shift due to met acidosis    Met acidosis: Combination of gap and non gap acidosis in the setting of sepsis and ARACELI    Sepsis: Source of infection. Pyelonephritis. On broad spectrum abx. Urine and blood cx pending.     Hx of NSCLC: Recently restarted Opdivo first cycle few days ago. Previously on several other chemo agents including immunotherapy in the past. Underwent lobectomy in the Jan 2024.     Volume status: Low UOP at present. No significant LE edema    Recs  ARACELI likely tubulointerstitial inflammation in the setting of infection. 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 Other possibility hemodynamic injury due to ACEI I, nausea, vomiting and poor po intake. Given met acidosis and worsening hyperkalemia. Switch IV fluids to bicarb based  Start bicarb based fluids @ 100cc/hr  Check Urine Na, cl and creatinine. Hold off on checking urine eos as it carries high false positive rate in the setting of infection   Abx per primary service and ID  Strict I/O  Dose meds to eGFR.   At risk for needing dialysis if there is no improvement in renal function              I discussed the patients findings and my recommendations with patient, family, and nursing staff    Jeronimo Clarke MD  04/13/24  18:08 EDT

## 2024-04-13 NOTE — ED NOTES
David Barfield    Nursing Report ED to Floor:  Mental status: AXO X4  Ambulatory status: HAVEN'T GOTTEN PT UP  Oxygen Therapy:  ROOM AIR  Cardiac Rhythm:   Admitted from: HOME  Safety Concerns:  FALL RISK  Social Issues: NONE  ED Room #:  17    ED Nurse Phone Extension - [phone] or may call [phone].      HPI:   Chief Complaint   Patient presents with    Weakness - Generalized       Past Medical History:  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    Diabetes mellitus Borderline    Emphysema/COPD     Erectile disorder     GERD (gastroesophageal reflux disease)     History of chemotherapy     Hyperlipidemia     Hypertension 2019    Lung nodule     Mumps     Mumps     Pruritus     after bath    Slow to wake up after anesthesia     Wears dentures     upper only    Wears hearing aid in both ears     usually only wears right        Past Surgical History:  Past Surgical History:   Procedure Laterality Date    BONE BIOPSY      broken bone surgery in his face    BRONCHOSCOPY THORACOTOMY Right 1/9/2024    Procedure: THORACOTOMY FOR LOWER LOBECTOMY AND MEDISTINAL LYMPH NODE DISSECTION RIGHT;  Surgeon: Joey Patel MD;  Location: [facility] OR;  Service: Cardiothoracic;  Laterality: Right;    BRONCHOSCOPY WITH ION ROBOTIC ASSIST N/A 09/15/2023    Procedure: BRONCHOSCOPY NAVIGATION WITH ENDOBRONCHIAL ULTRASOUND AND ION ROBOT;  Surgeon: Octaviano Sampson MD;  Location:  [facility];  Service: Robotics - Pulmonary;  Laterality: N/A;  ion #6 - 0032  - 0015  Cath guide [id number]    EBUS balloon removed and intact    CARDIAC ELECTROPHYSIOLOGY PROCEDURE N/A 08/17/2021    Procedure: Pacemaker DC new;  Surgeon: Kayy Box MD;  Location:  [facility];  Service: Cardiology;  Laterality: N/A;    FACIAL FRACTURE SURGERY      PACEMAKER IMPLANTATION          Admitting Doctor:   Rosalia Mao,  DO    Consulting Provider(s):  Consults       No orders found from 3/14/2024 to 4/13/2024.             Admitting Diagnosis:   The primary encounter diagnosis was Sepsis with acute renal failure without septic shock, due to unspecified organism, unspecified acute renal failure type. Diagnoses of Pyelonephritis, Nausea vomiting and diarrhea, Leukocytosis, unspecified type, Lactic acidosis, Generalized weakness, Postural dizziness with near syncope, Bronchogenic cancer of right lung, Immunosuppression, Elevated procalcitonin, History of emphysema, History of diabetes mellitus, History of hypertension, and Former smoker were also pertinent to this visit.    Most Recent Vitals:   Vitals:    04/12/24 2159 04/12/24 2200 04/12/24 2230 04/12/24 2254   BP:  104/56 109/60    Pulse: 71 72 82 82   Resp:       Temp:       TempSrc:       SpO2: 96% 96% 91% 99%   Weight:       Height:           Active LDAs/IV Access:   Lines, Drains & Airways       Active LDAs       Name Placement date Placement time Site Days    Peripheral IV 04/12/24 1852 Left Antecubital 04/12/24  1852  Antecubital  less than 1    Peripheral IV 04/12/24 1905 Right Antecubital 04/12/24  1905  Antecubital  less than 1    Single Lumen Implantable Port Left Subclavian --  --  Subclavian  --                    Labs (abnormal labs have a star):   Labs Reviewed   COMPREHENSIVE METABOLIC PANEL - Abnormal; Notable for the following components:       Result Value    Glucose 117 (*)     Creatinine 2.23 (*)     Potassium 3.4 (*)     CO2 21.0 (*)     Anion Gap 16.0 (*)     eGFR 30.0 (*)     All other components within normal limits    Narrative:     GFR Normal >60  Chronic Kidney Disease <60  Kidney Failure <15    The GFR formula is only valid for adults with stable renal function between ages 18 and 70.   SINGLE HS TROPONIN T - Abnormal; Notable for the following components:    HS Troponin T 35 (*)     All other components within normal limits    Narrative:     High  "Sensitive Troponin T Reference Range:  <14.0 ng/L- Negative Female for AMI  <22.0 ng/L- Negative Male for AMI  >=14 - Abnormal Female indicating possible myocardial injury.  >=22 - Abnormal Male indicating possible myocardial injury.   Clinicians would have to utilize clinical acumen, EKG, Troponin, and serial changes to determine if it is an Acute Myocardial Infarction or myocardial injury due to an underlying chronic condition.        CBC WITH AUTO DIFFERENTIAL - Abnormal; Notable for the following components:    WBC 36.28 (*)     RBC 4.11 (*)     Hemoglobin 12.4 (*)     MCV 97.3 (*)     MCHC 31.0 (*)     All other components within normal limits    Narrative:     The previously reported component NRBC is no longer being reported. Previous result was 0.0 /100 WBC (Reference Range: 0.0-0.2 /100 WBC) on 4/12/2024 at 1918 EDT.   LACTIC ACID, PLASMA - Abnormal; Notable for the following components:    Lactate 6.7 (*)     All other components within normal limits   PROCALCITONIN - Abnormal; Notable for the following components:    Procalcitonin 4.88 (*)     All other components within normal limits    Narrative:     As a Marker for Sepsis (Non-Neonates):    1. <0.5 ng/mL represents a low risk of severe sepsis and/or septic shock.  2. >2 ng/mL represents a high risk of severe sepsis and/or septic shock.    As a Marker for Lower Respiratory Tract Infections that require antibiotic therapy:    PCT on Admission    Antibiotic Therapy       6-12 Hrs later    >0.5                Strongly Recommended  >0.25 - <0.5        Recommended   0.1 - 0.25          Discouraged              Remeasure/reassess PCT  <0.1                Strongly Discouraged     Remeasure/reassess PCT    As 28 day mortality risk marker: \"Change in Procalcitonin Result\" (>80% or <=80%) if Day 0 (or Day 1) and Day 4 values are available. Refer to http://www.[facility]-pct-calculator.com    Change in PCT <=80%  A decrease of PCT levels below or equal to 80% defines " a positive change in PCT test result representing a higher risk for 28-day all-cause mortality of patients diagnosed with severe sepsis for septic shock.    Change in PCT >80%  A decrease of PCT levels of more than 80% defines a negative change in PCT result representing a lower risk for 28-day all-cause mortality of patients diagnosed with severe sepsis or septic shock.      MANUAL DIFFERENTIAL - Abnormal; Notable for the following components:    Neutrophil % 95.0 (*)     Lymphocyte % 2.0 (*)     Monocyte % 3.0 (*)     Eosinophil % 0.0 (*)     Neutrophils Absolute 34.47 (*)     Monocytes Absolute 1.09 (*)     All other components within normal limits   RESPIRATORY PANEL PCR W/ COVID-19 (SARS-COV-2), NP SWAB IN UTM/VTP, 2 HR TAT - Normal    Narrative:     In the setting of a positive respiratory panel with a viral infection PLUS a negative procalcitonin without other underlying concern for bacterial infection, consider observing off antibiotics or discontinuation of antibiotics and continue supportive care. If the respiratory panel is positive for atypical bacterial infection (Bordetella pertussis, Chlamydophila pneumoniae, or Mycoplasma pneumoniae), consider antibiotic de-escalation to target atypical bacterial infection.   MAGNESIUM - Normal   LIPASE - Normal   COVID PRE-OP / PRE-PROCEDURE SCREENING ORDER (NO ISOLATION)    Narrative:     The following orders were created for panel order COVID PRE-OP / PRE-PROCEDURE SCREENING ORDER (NO ISOLATION) - Swab, Nasopharynx.  Procedure                               Abnormality         Status                     ---------                               -----------         ------                     Respiratory Panel PCR w/...[[id number]]  Normal              Final result                 Please view results for these tests on the individual orders.   BLOOD CULTURE   BLOOD CULTURE   MRSA SCREEN, PCR   RAINBOW DRAW    Narrative:     The following orders were created for panel  order [place] Draw.  Procedure                               Abnormality         Status                     ---------                               -----------         ------                     Green Top (Gel)[[id number]]                                  Final result               Lavender Top[[id number]]                                     Final result               Gold Top - SST[[id number]]                                   Final result               Roblero Top[[id number]]                                         Final result               Light Blue Top[[id number]]                                   Final result                 Please view results for these tests on the individual orders.   LACTIC ACID, REFLEX   URINALYSIS W/ CULTURE IF INDICATED   URINALYSIS, MICROSCOPIC ONLY   CBC AND DIFFERENTIAL    Narrative:     The following orders were created for panel order CBC & Differential.  Procedure                               Abnormality         Status                     ---------                               -----------         ------                     CBC Auto Differential[[id number]]        Abnormal            Final result               Scan Slide[[id number]]                                                                    Please view results for these tests on the individual orders.   GREEN TOP   LAVENDER TOP   GOLD TOP - SST   GRAY TOP   LIGHT BLUE TOP       Meds Given in ED:   Medications   sodium chloride 0.9 % flush 10 mL (has no administration in time range)   vancomycin IVPB 1750 mg in 0.9% Sodium Chloride (premix) 500 mL (1,750 mg Intravenous New Bag 4/12/24 2150)   ertapenem (INVanz) 1,000 mg in sodium chloride 0.9 % 100 mL MBP (has no administration in time range)   Pharmacy to dose vancomycin (has no administration in time range)   sodium chloride 0.9 % bolus 1,000 mL (has no administration in time range)   sodium chloride 0.9 % bolus 2,340 mL (0 mL Intravenous Stopped 4/12/24 2157)    piperacillin-tazobactam (ZOSYN) 3.375 g IVPB in 100 mL NS MBP (CD) (0 g Intravenous Stopped 4/12/24 2148)   ondansetron (ZOFRAN) injection 4 mg (4 mg Intravenous Given 4/12/24 2022)     Pharmacy to dose vancomycin,          Last NIH score:                                                          Dysphagia screening results:        Luis E Coma Scale:  No data recorded     CIWA:        Restraint Type:            Isolation Status:  No active isolations

## 2024-04-13 NOTE — PROGRESS NOTES
[facility] Medicine Services  PROGRESS NOTE    Patient Name: David Barfield  : 1950  MRN: [id number]    Date of Admission: 2024  Primary Care Physician: Shahid Drake MD    Subjective   Subjective     CC:  Follow-up sepsis    HPI:  Blood pressure has been 71/51 now improved to 106/60. No BM documented.  Patient's wife is at bedside and patient okay with her being updated.  Denied any headache, vision changes, chest pain, shortness of breath, cough, abdominal pain, nausea,, or dysuria.  Has had right-sided flank pain.  Nausea and vomiting resolved.  Although he reported prior diarrhea, he then states it was only 1 episode yesterday.    Objective   Objective     Vital Signs:   Temp:  [97.6 °F (36.4 °C)-98.4 °F (36.9 °C)] 97.6 °F (36.4 °C)  Heart Rate:  [] 70  Resp:  [18] 18  BP: ()/(51-79) 106/60     Physical Exam:  Constitutional: No acute distress, awake, alert, laying in bed  HENT: NCAT, mucous membranes moist  Respiratory: Clear to auscultation, respiratory effort normal   Cardiovascular: RRR, no murmurs, rubs, or gallops  Gastrointestinal: Normoactive bowel sounds, soft, nondistended  Musculoskeletal: No bilateral ankle edema; right CVA area tender to palpation  Psychiatric: Appropriate affect, cooperative  Neurologic: PERRL, symmetric facies, moves all extremities well, speech clear  Skin: No rashes on exposed skin    Results Reviewed:  LAB RESULTS:      Lab 24  0857 24  0330 24  0158 24  2311 24  1905 04/10/24  1355   WBC  --   --  46.05*  --  36.28* 13.92*   HEMOGLOBIN  --   --  9.0*  --  12.4* 11.2*   HEMATOCRIT  --   --  28.9*  --  40.0 34.5*   PLATELETS  --   --  185  --  291 396   NEUTROS ABS  --   --  42.12*  --  34.47* 10.73*   IMMATURE GRANS (ABS)  --   --  1.50*  --   --  0.02   LYMPHS ABS  --   --  0.64*  --   --  1.92   MONOS ABS  --   --  1.67*  --   --  1.04*   EOS ABS  --   --  0.00  --  0.00 0.18   MCV  --   --   99.3*  --  97.3* 95.8   PROCALCITONIN  --   --   --   --  4.88*  --    LACTATE 2.1* 2.2* 2.1* 3.9* 6.7*  --          Lab 04/13/24  0158 04/12/24  1905 04/10/24  1355   SODIUM  --  142 141   POTASSIUM  --  3.4* 4.0   CHLORIDE  --  105 104   CO2  --  21.0* 27.0   ANION GAP  --  16.0* 10.0   BUN  --  22 17   CREATININE  --  2.23* 1.55*   EGFR  --  30.0* 46.4*   GLUCOSE  --  117* 110*   CALCIUM  --  9.2 9.7   MAGNESIUM 1.3* 1.8  1.7  --    PHOSPHORUS 1.7*  --   --    TSH 1.150  --   --          Lab 04/12/24  1905 04/10/24  1355   TOTAL PROTEIN 7.9 9.0*   ALBUMIN 3.6 4.1   GLOBULIN 4.3  --    ALT (SGPT) 15 19   AST (SGOT) 33 24   BILIRUBIN 0.4 0.2   BILIRUBIN DIRECT  --  <0.2   ALK PHOS 81 95   LIPASE 31  --          Lab 04/13/24  0330 04/13/24 0158 04/12/24 1905   HSTROP T 37* 34* 35*                 Brief Urine Lab Results  (Last result in the past 365 days)        Color   Clarity   Blood   Leuk Est   Nitrite   Protein   CREAT   Urine HCG        04/12/24 2247 Yellow   Clear   Small (1+)   Large (3+)   Negative   >=300 mg/dL (3+)                   Microbiology Results Abnormal       Procedure Component Value - Date/Time    MRSA Screen, PCR (Inpatient) - Swab, Nares [[id number]]  (Normal) Collected: 04/12/24 2128    Lab Status: Final result Specimen: Swab from Nares Updated: 04/13/24 0803     MRSA PCR Negative    Narrative:      The negative predictive value of this diagnostic test is high and should only be used to consider de-escalating anti-MRSA therapy. A positive result may indicate colonization with MRSA and must be correlated clinically.  MRSA Negative    COVID PRE-OP / PRE-PROCEDURE SCREENING ORDER (NO ISOLATION) - Swab, Nasopharynx [[id number]]  (Normal) Collected: 04/12/24 2010    Lab Status: Final result Specimen: Swab from Nasopharynx Updated: 04/12/24 2121    Narrative:      The following orders were created for panel order COVID PRE-OP / PRE-PROCEDURE SCREENING ORDER (NO ISOLATION) - Swab,  Nasopharynx.  Procedure                               Abnormality         Status                     ---------                               -----------         ------                     Respiratory Panel PCR w/...[[id number]]  Normal              Final result                 Please view results for these tests on the individual orders.    Respiratory Panel PCR w/COVID-19(SARS-CoV-2) [facility] In-House, NP Swab in UTM/VTM, 2 HR TAT - Swab, Nasopharynx [[id number]]  (Normal) Collected: 04/12/24 2010    Lab Status: Final result Specimen: Swab from Nasopharynx Updated: 04/12/24 2121     ADENOVIRUS, PCR Not Detected     Coronavirus 229E Not Detected     Coronavirus HKU1 Not Detected     Coronavirus NL63 Not Detected     Coronavirus OC43 Not Detected     COVID19 Not Detected     Human Metapneumovirus Not Detected     Human Rhinovirus/Enterovirus Not Detected     Influenza A PCR Not Detected     Influenza B PCR Not Detected     Parainfluenza Virus 1 Not Detected     Parainfluenza Virus 2 Not Detected     Parainfluenza Virus 3 Not Detected     Parainfluenza Virus 4 Not Detected     RSV, PCR Not Detected     Bordetella pertussis pcr Not Detected     Bordetella parapertussis PCR Not Detected     Chlamydophila pneumoniae PCR Not Detected     Mycoplasma pneumo by PCR Not Detected    Narrative:      In the setting of a positive respiratory panel with a viral infection PLUS a negative procalcitonin without other underlying concern for bacterial infection, consider observing off antibiotics or discontinuation of antibiotics and continue supportive care. If the respiratory panel is positive for atypical bacterial infection (Bordetella pertussis, Chlamydophila pneumoniae, or Mycoplasma pneumoniae), consider antibiotic de-escalation to target atypical bacterial infection.            CT Chest Without Contrast Diagnostic    Result Date: 4/12/2024  CT ABDOMEN PELVIS WO CONTRAST, CT CHEST WO CONTRAST DIAGNOSTIC Date of  Exam: 4/12/2024 8:00 PM EDT Indication: Sepsis. Comparison: 3/28/2024 PET/CT; Technique: Axial CT images were obtained of the chest, abdomen and pelvis without the administration of contrast. Reconstructed coronal and sagittal images were also obtained. Automated exposure control and iterative construction methods were used. Findings: CT chest: The central airways are patent. Redemonstrated postsurgical changes of the right lung with medial inferior scarring and small adjacent loculated pleural effusion. There is background emphysematous changes with biapical pleural-parenchymal scarring. Scattered calcified granulomas. No suspicious pulmonary nodule or mass. No focal airspace consolidation. Right chest wall cardiac device distal lead tips in unchanged position. Left chest wall port with distal lead tip overlying the superior vena cava. The heart is unchanged in size. Coronary artery calcifications. No pericardial effusion. No mediastinal mass. Prominent mediastinal lymph nodes. Prominent right supraclavicular lymph node. Chest wall soft tissues show no acute abnormality. CT abdomen/pelvis: There is no suspicious hepatic lesion. The gallbladder is unremarkable. No significant biliary ductal dilation. The spleen, pancreas, and bilateral adrenal glands are unchanged. As compared to 3/28/2024 PET/CT, there is edematous appearance of the bilateral kidneys with extensive bilateral perinephric fat stranding. No hydronephrosis or nephrolithiasis. Small hiatal hernia. No evidence of bowel obstruction. No suspicious abdominal or pelvic lymphadenopathy. No abdominal aortic aneurysm. Atherosclerotic calcifications of the aorta and branch vessels. There is circumferential wall thickening of the urinary bladder. The prostate is enlarged with prostatic calcifications. Abdominal and pelvic wall soft tissues show no acute abnormality. Small bilateral fat-containing inguinal hernias. Redemonstrated right lateral fifth and sixth rib  fractures which are healing. There is no evidence of acute fracture or suspicious osseous lesion.     Impression: Impression: Findings concerning for bilateral pyelonephritis. Circumferential wall thickening of the urinary bladder which can be seen with cystitis. No evidence of abscess formation, hydronephrosis, or other complication. Additional chronic findings as above including surgical changes of the right lung and healing right lateral fifth and sixth rib fractures. Small hiatal hernia. Previously seen mildly prominent hypermetabolic mediastinal and right supraclavicular lymph nodes are unchanged. Electronically Signed: Raulito Light MD  4/12/2024 8:22 PM EDT  Workstation ID: [id number]    CT Abdomen Pelvis Without Contrast    Result Date: 4/12/2024  CT ABDOMEN PELVIS WO CONTRAST, CT CHEST WO CONTRAST DIAGNOSTIC Date of Exam: 4/12/2024 8:00 PM EDT Indication: Sepsis. Comparison: 3/28/2024 PET/CT; Technique: Axial CT images were obtained of the chest, abdomen and pelvis without the administration of contrast. Reconstructed coronal and sagittal images were also obtained. Automated exposure control and iterative construction methods were used. Findings: CT chest: The central airways are patent. Redemonstrated postsurgical changes of the right lung with medial inferior scarring and small adjacent loculated pleural effusion. There is background emphysematous changes with biapical pleural-parenchymal scarring. Scattered calcified granulomas. No suspicious pulmonary nodule or mass. No focal airspace consolidation. Right chest wall cardiac device distal lead tips in unchanged position. Left chest wall port with distal lead tip overlying the superior vena cava. The heart is unchanged in size. Coronary artery calcifications. No pericardial effusion. No mediastinal mass. Prominent mediastinal lymph nodes. Prominent right supraclavicular lymph node. Chest wall soft tissues show no acute abnormality. CT abdomen/pelvis: There  is no suspicious hepatic lesion. The gallbladder is unremarkable. No significant biliary ductal dilation. The spleen, pancreas, and bilateral adrenal glands are unchanged. As compared to 3/28/2024 PET/CT, there is edematous appearance of the bilateral kidneys with extensive bilateral perinephric fat stranding. No hydronephrosis or nephrolithiasis. Small hiatal hernia. No evidence of bowel obstruction. No suspicious abdominal or pelvic lymphadenopathy. No abdominal aortic aneurysm. Atherosclerotic calcifications of the aorta and branch vessels. There is circumferential wall thickening of the urinary bladder. The prostate is enlarged with prostatic calcifications. Abdominal and pelvic wall soft tissues show no acute abnormality. Small bilateral fat-containing inguinal hernias. Redemonstrated right lateral fifth and sixth rib fractures which are healing. There is no evidence of acute fracture or suspicious osseous lesion.     Impression: Impression: Findings concerning for bilateral pyelonephritis. Circumferential wall thickening of the urinary bladder which can be seen with cystitis. No evidence of abscess formation, hydronephrosis, or other complication. Additional chronic findings as above including surgical changes of the right lung and healing right lateral fifth and sixth rib fractures. Small hiatal hernia. Previously seen mildly prominent hypermetabolic mediastinal and right supraclavicular lymph nodes are unchanged. Electronically Signed: Raulito Light MD  4/12/2024 8:22 PM EDT  Workstation ID: [id number]    XR Chest 1 View    Result Date: 4/12/2024  XR CHEST 1 VW Date of Exam: 4/12/2024 6:56 PM EDT Indication: Weak/Dizzy/AMS triage protocol Comparison: 2/21/2024 CT Findings: Right chest wall cardiac device distal lead tips in unchanged position. Left chest wall port with distal lead tip overlying the superior vena cava. There is a chronic small right-sided pleural effusion. No evidence of left pleural effusion.  No new focal airspace consolidation. No pneumothorax. No acute osseous abnormality.     Impression: Impression: No acute cardiopulmonary abnormality. Chronic findings as above. Electronically Signed: Raulito Light MD  4/12/2024 7:26 PM EDT  Workstation ID: [id number]     Results for orders placed during the hospital encounter of 07/09/21    Adult Transthoracic Echo Complete W/ Cont if Necessary Per Protocol    Interpretation Summary  · Estimated left ventricular EF = 55% Left ventricular systolic function is normal.  · Left ventricular diastolic function was normal.  · Left ventricular wall thickness is consistent with mild concentric hypertrophy.  · Trace mitral and tricuspid regurgitation.      Current medications:  Scheduled Meds:budesonide-formoterol, 2 puff, Inhalation, BID - RT   And  tiotropium bromide monohydrate, 2 puff, Inhalation, Daily - RT  ertapenem, 1,000 mg, Intravenous, Q24H  heparin (porcine), 5,000 Units, Subcutaneous, Q8H  magnesium sulfate, 2 g, Intravenous, Q2H  nicotine, 1 patch, Transdermal, Q24H  [Held by provider] pantoprazole, 40 mg, Oral, Q AM  pravastatin, 80 mg, Oral, Nightly  sodium chloride, 10 mL, Intravenous, Q12H  tamsulosin, 0.4 mg, Oral, Daily      Continuous Infusions:Pharmacy to dose vancomycin,   sodium chloride, 75 mL/hr, Last Rate: 75 mL/hr (04/13/24 0137)      PRN Meds:.  acetaminophen **OR** acetaminophen **OR** acetaminophen    Calcium Replacement - Follow Nurse / BPA Driven Protocol    HYDROcodone-acetaminophen    Magnesium Standard Dose Replacement - Follow Nurse / BPA Driven Protocol    nicotine polacrilex    ondansetron    ondansetron ODT **OR** ondansetron    Pharmacy to dose vancomycin    Phosphorus Replacement - Follow Nurse / BPA Driven Protocol    Potassium Replacement - Follow Nurse / BPA Driven Protocol    prochlorperazine    sodium chloride    sodium chloride    sodium chloride    Assessment & Plan   Assessment & Plan     Active Hospital Problems    Diagnosis   POA    **Sepsis [A41.9]  Yes    ARACELI (acute kidney injury) [N17.9]  Unknown    Hypokalemia [E87.6]  Unknown    Acute pyelonephritis [N10]  Unknown    Primary hypertension [I10]  Yes    Centrilobular emphysema [J43.2]  Yes    Tobacco abuse [Z72.0]  Yes    Presence of cardiac pacemaker [Z95.0]  Yes    Mixed hyperlipidemia [E78.2]  Yes      Resolved Hospital Problems   No resolved problems to display.        Brief Hospital Course to date:  David Barfield is a 74 y.o. male with a PMH significant for non-small cell lung cancer s/p neoadjuvant chemoimmunotherapy and RLL lobectomy (1/2024) currently on Opdivo (last tx 4/4/24), tobacco use disorder, HTN, emphysema who resented to the ER due to nausea, vomiting, diarrhea.  Found to have severe sepsis secondary to acute pyelonephritis in immunosuppressed patient.    This patient's problems and plans were partially entered by my partner and updated as appropriate by me 04/13/24.     Severe sepsis  Acute pyelonephritis  Immunosuppressed  - Tachycardic, elevated lactic acid, elevated procalcitonin, leukocytosis, source of infection, ARACELI, hypotension  - Given 30 cc/kg IVF bolus in ED with persistent hypotension.  Given additional IVF with improvement of blood pressure  - Ertapenem, stopped vancomycin  - Follow-up C. difficile and GI panel, blood cultures  - Lactic acid trending down  - ID following     ARACELI, POA  --Baseline creatinine 0.94-1.1; creatinine was 2 on 4/4 and 1.55 on 4/10; Cr worsening on labs this AM despite IVF; could be related to sepsis/infection  -IV fluids  --Acute urinary retention protocol  --Hold nephrotoxic medications  --A.m. labs  --Renal consulted     Non-small cell lung cancer  - Follows with Dr. Gely Ashley  - Last treatment with Opdivo 4/4/2024    Chronic anemia  - Hemoglobin 9.  The patient's wife repeatedly questioned why the patient was not getting a transfusion of red blood cells, explained to her several times that based on his current  hemoglobin, that is not medically necessary at this time and will continue to monitor.     Hypokalemia  Hypomagnesemia  - Replace per protocol     Hypertension  - Hold home lisinopril due to hypotension and ARACELI    Expected Discharge Location and Transportation: D  Expected Discharge   Expected Discharge Date: 4/17/2024; Expected Discharge Time:      DVT prophylaxis:  Medical DVT prophylaxis orders are present.              CODE STATUS:   Code Status and Medical Interventions:   Ordered at: 04/13/24 0017     Level Of Support Discussed With:    Patient     Code Status (Patient has no pulse and is not breathing):    CPR (Attempt to Resuscitate)     Medical Interventions (Patient has pulse or is breathing):    Full Support       Amy Hernandez MD  04/13/24

## 2024-04-13 NOTE — H&P
[facility]  HISTORY AND PHYSICAL    Patient Name: David Barfield  : 1950  MRN: [id number]  Primary Care Physician: Shahid Drake MD  Date of admission: 2024      Subjective   Subjective     Chief Complaint:  Nausea and vomiting    HPI:  David Barfield is a 74 y.o. male with a PMH significant for non-small cell lung cancer s/p neoadjuvant chemoimmunotherapy and RLL lobectomy (2024) currently on Opdivo (last tx 24), tobacco abuse, hypertension, emphysema who comes to the ED due to nausea and vomiting.  Patient woke up this morning with new onset lower back pain, right greater than left.  This afternoon he developed nausea, vomiting x3 and diarrhea x1 prompting him to come to the ED.      Personal History     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    Diabetes mellitus Borderline    Emphysema/COPD     Erectile disorder     GERD (gastroesophageal reflux disease)     History of chemotherapy     Hyperlipidemia     Hypertension 2019    Lung nodule     Mumps     Mumps     Pruritus     after bath    Slow to wake up after anesthesia     Wears dentures     upper only    Wears hearing aid in both ears     usually only wears right         Oncology Problem List:  Lung cancer (2024; Status: Resolved)  Bronchogenic cancer of right lung (10/04/2023; Status: Active)  Malignant neoplasm of lower lobe of right lung (10/04/2023; Status:   Active)  Oncology/Hematology History   Malignant neoplasm of lower lobe of right lung   10/4/2023 Initial Diagnosis    Malignant neoplasm of lower lobe of right lung     10/4/2023 Cancer Staged    Staging form: Lung, AJCC 8th Edition  - Clinical: Stage IIIA (cT2b, cN2, cM0) - Signed by Neetu Ashley MD on 10/4/2023     10/23/2023 - 2023 Chemotherapy    OP LUNG  Nivolumab 360mg /  PACLitaxel /  CARBOplatin AUC=5      10/23/2023 -  Chemotherapy    OP CENTRAL VENOUS ACCESS DEVICE Access, Care, and Maintenance (CVAD)     2/6/2024 - 2/27/2024 Chemotherapy    OP LUNG Atezolizumab       Past Surgical History:   Procedure Laterality Date    BONE BIOPSY      broken bone surgery in his face    BRONCHOSCOPY THORACOTOMY Right 1/9/2024    Procedure: THORACOTOMY FOR LOWER LOBECTOMY AND MEDISTINAL LYMPH NODE DISSECTION RIGHT;  Surgeon: Joey Patel MD;  Location: [facility] OR;  Service: Cardiothoracic;  Laterality: Right;    BRONCHOSCOPY WITH ION ROBOTIC ASSIST N/A 09/15/2023    Procedure: BRONCHOSCOPY NAVIGATION WITH ENDOBRONCHIAL ULTRASOUND AND ION ROBOT;  Surgeon: Octaviano Sampson MD;  Location:  [facility];  Service: Robotics - Pulmonary;  Laterality: N/A;  ion #6 - 0032  - 0015  Cath guide 0061    EBUS balloon removed and intact    CARDIAC ELECTROPHYSIOLOGY PROCEDURE N/A 08/17/2021    Procedure: Pacemaker DC new;  Surgeon: Kayy Box MD;  Location:  [facility];  Service: Cardiology;  Laterality: N/A;    FACIAL FRACTURE SURGERY      PACEMAKER IMPLANTATION         Family History: family history includes Aneurysm in his mother; Dementia in his father; Heart disease in his paternal grandmother; Hypertension in his paternal grandfather; Leukemia in his sister.     Social History:  reports that he has quit smoking. His smoking use included cigarettes. He started smoking about 56 years ago. He has a 56.3 pack-year smoking history. He has never used smokeless tobacco. He reports that he does not drink alcohol and does not use drugs.  Social History     Social History Narrative    Lives in [place], [place]       Medications:  Available home medication information reviewed.  Atezolizumab, Fluticasone-Umeclidin-Vilant, HYDROcodone-acetaminophen, albuterol sulfate HFA, cefdinir, fluticasone, lidocaine-prilocaine, lisinopril, omeprazole, ondansetron, pravastatin, prochlorperazine,  "sildenafil, and tamsulosin    Allergies   Allergen Reactions    Cymbalta [Duloxetine Hcl] GI Intolerance    Gabapentin Mental Status Change     Pt states that this medication \"makes him feel foolish in his head\".     Remeron [Mirtazapine] Other (See Comments)     Excess sedation    Toradol [Ketorolac Tromethamine] GI Intolerance     Projectile vomiting     Latex Other (See Comments)     Latex allergy     Tape Rash       Objective   Objective     Vital Signs:   Temp:  [98.4 °F (36.9 °C)] 98.4 °F (36.9 °C)  Heart Rate:  [] 81  Resp:  [18] 18  BP: ()/(51-72) 84/59       Physical Exam   Constitutional: Awake, alert  Eyes: PERRLA, sclerae anicteric, no conjunctival injection  HENT: NCAT, mucous membranes moist  Neck: Supple, no thyromegaly, no lymphadenopathy, trachea midline  Respiratory: Clear to auscultation bilaterally, nonlabored respirations   Cardiovascular: RRR, no murmurs, rubs, or gallops, palpable pedal pulses bilaterally  Gastrointestinal: Positive bowel sounds, soft, nontender, nondistended  Musculoskeletal: No bilateral ankle edema, no clubbing or cyanosis to extremities  Psychiatric: Appropriate affect, cooperative  Neurologic: Oriented x 3, strength symmetric in all extremities, Cranial Nerves grossly intact to confrontation, speech clear  Skin: No rashes      Result Review:  I have personally reviewed the results from the time of this admission to 4/12/2024 23:50 EDT and agree with these findings:  [x]  Laboratory list / accordion  [x]  Microbiology  [x]  Radiology  []  EKG/Telemetry   [x]  Cardiology/Vascular   []  Pathology  [x]  Old records      LAB RESULTS:      Lab 04/12/24  2311 04/12/24  1905 04/10/24  1355   WBC  --  36.28* 13.92*   HEMOGLOBIN  --  12.4* 11.2*   HEMATOCRIT  --  40.0 34.5*   PLATELETS  --  291 396   NEUTROS ABS  --  34.47* 10.73*   IMMATURE GRANS (ABS)  --   --  0.02   LYMPHS ABS  --   --  1.92   MONOS ABS  --   --  1.04*   EOS ABS  --  0.00 0.18   MCV  --  97.3* 95.8 "   PROCALCITONIN  --  4.88*  --    LACTATE 3.9* 6.7*  --          Lab 04/12/24  1905 04/10/24  1355   SODIUM 142 141   POTASSIUM 3.4* 4.0   CHLORIDE 105 104   CO2 21.0* 27.0   ANION GAP 16.0* 10.0   BUN 22 17   CREATININE 2.23* 1.55*   EGFR 30.0* 46.4*   GLUCOSE 117* 110*   CALCIUM 9.2 9.7   MAGNESIUM 1.7  --          Lab 04/12/24  1905 04/10/24  1355   TOTAL PROTEIN 7.9 9.0*   ALBUMIN 3.6 4.1   GLOBULIN 4.3  --    ALT (SGPT) 15 19   AST (SGOT) 33 24   BILIRUBIN 0.4 0.2   BILIRUBIN DIRECT  --  <0.2   ALK PHOS 81 95   LIPASE 31  --          Lab 04/12/24  1905   HSTROP T 35*                     Microbiology Results (last 10 days)       Procedure Component Value - Date/Time    COVID PRE-OP / PRE-PROCEDURE SCREENING ORDER (NO ISOLATION) - Swab, Nasopharynx [[id number]]  (Normal) Collected: 04/12/24 2010    Lab Status: Final result Specimen: Swab from Nasopharynx Updated: 04/12/24 2121    Narrative:      The following orders were created for panel order COVID PRE-OP / PRE-PROCEDURE SCREENING ORDER (NO ISOLATION) - Swab, Nasopharynx.  Procedure                               Abnormality         Status                     ---------                               -----------         ------                     Respiratory Panel PCR w/...[[id number]]  Normal              Final result                 Please view results for these tests on the individual orders.    Respiratory Panel PCR w/COVID-19(SARS-CoV-2) [facility]-House, NP Swab in UTM/VTM, 2 HR TAT - Swab, Nasopharynx [[id number]]  (Normal) Collected: 04/12/24 2010    Lab Status: Final result Specimen: Swab from Nasopharynx Updated: 04/12/24 2121     ADENOVIRUS, PCR Not Detected     Coronavirus 229E Not Detected     Coronavirus HKU1 Not Detected     Coronavirus NL63 Not Detected     Coronavirus OC43 Not Detected     COVID19 Not Detected     Human Metapneumovirus Not Detected     Human Rhinovirus/Enterovirus Not Detected     Influenza A PCR Not Detected      Influenza B PCR Not Detected     Parainfluenza Virus 1 Not Detected     Parainfluenza Virus 2 Not Detected     Parainfluenza Virus 3 Not Detected     Parainfluenza Virus 4 Not Detected     RSV, PCR Not Detected     Bordetella pertussis pcr Not Detected     Bordetella parapertussis PCR Not Detected     Chlamydophila pneumoniae PCR Not Detected     Mycoplasma pneumo by PCR Not Detected    Narrative:      In the setting of a positive respiratory panel with a viral infection PLUS a negative procalcitonin without other underlying concern for bacterial infection, consider observing off antibiotics or discontinuation of antibiotics and continue supportive care. If the respiratory panel is positive for atypical bacterial infection (Bordetella pertussis, Chlamydophila pneumoniae, or Mycoplasma pneumoniae), consider antibiotic de-escalation to target atypical bacterial infection.            CT Chest Without Contrast Diagnostic    Result Date: 4/12/2024  CT ABDOMEN PELVIS WO CONTRAST, CT CHEST WO CONTRAST DIAGNOSTIC Date of Exam: 4/12/2024 8:00 PM EDT Indication: Sepsis. Comparison: 3/28/2024 PET/CT; Technique: Axial CT images were obtained of the chest, abdomen and pelvis without the administration of contrast. Reconstructed coronal and sagittal images were also obtained. Automated exposure control and iterative construction methods were used. Findings: CT chest: The central airways are patent. Redemonstrated postsurgical changes of the right lung with medial inferior scarring and small adjacent loculated pleural effusion. There is background emphysematous changes with biapical pleural-parenchymal scarring. Scattered calcified granulomas. No suspicious pulmonary nodule or mass. No focal airspace consolidation. Right chest wall cardiac device distal lead tips in unchanged position. Left chest wall port with distal lead tip overlying the superior vena cava. The heart is unchanged in size. Coronary artery calcifications. No  pericardial effusion. No mediastinal mass. Prominent mediastinal lymph nodes. Prominent right supraclavicular lymph node. Chest wall soft tissues show no acute abnormality. CT abdomen/pelvis: There is no suspicious hepatic lesion. The gallbladder is unremarkable. No significant biliary ductal dilation. The spleen, pancreas, and bilateral adrenal glands are unchanged. As compared to 3/28/2024 PET/CT, there is edematous appearance of the bilateral kidneys with extensive bilateral perinephric fat stranding. No hydronephrosis or nephrolithiasis. Small hiatal hernia. No evidence of bowel obstruction. No suspicious abdominal or pelvic lymphadenopathy. No abdominal aortic aneurysm. Atherosclerotic calcifications of the aorta and branch vessels. There is circumferential wall thickening of the urinary bladder. The prostate is enlarged with prostatic calcifications. Abdominal and pelvic wall soft tissues show no acute abnormality. Small bilateral fat-containing inguinal hernias. Redemonstrated right lateral fifth and sixth rib fractures which are healing. There is no evidence of acute fracture or suspicious osseous lesion.     Impression: Impression: Findings concerning for bilateral pyelonephritis. Circumferential wall thickening of the urinary bladder which can be seen with cystitis. No evidence of abscess formation, hydronephrosis, or other complication. Additional chronic findings as above including surgical changes of the right lung and healing right lateral fifth and sixth rib fractures. Small hiatal hernia. Previously seen mildly prominent hypermetabolic mediastinal and right supraclavicular lymph nodes are unchanged. Electronically Signed: Raulito Light MD  4/12/2024 8:22 PM EDT  Workstation ID: [id number]    CT Abdomen Pelvis Without Contrast    Result Date: 4/12/2024  CT ABDOMEN PELVIS WO CONTRAST, CT CHEST WO CONTRAST DIAGNOSTIC Date of Exam: 4/12/2024 8:00 PM EDT Indication: Sepsis. Comparison: 3/28/2024 PET/CT;  Technique: Axial CT images were obtained of the chest, abdomen and pelvis without the administration of contrast. Reconstructed coronal and sagittal images were also obtained. Automated exposure control and iterative construction methods were used. Findings: CT chest: The central airways are patent. Redemonstrated postsurgical changes of the right lung with medial inferior scarring and small adjacent loculated pleural effusion. There is background emphysematous changes with biapical pleural-parenchymal scarring. Scattered calcified granulomas. No suspicious pulmonary nodule or mass. No focal airspace consolidation. Right chest wall cardiac device distal lead tips in unchanged position. Left chest wall port with distal lead tip overlying the superior vena cava. The heart is unchanged in size. Coronary artery calcifications. No pericardial effusion. No mediastinal mass. Prominent mediastinal lymph nodes. Prominent right supraclavicular lymph node. Chest wall soft tissues show no acute abnormality. CT abdomen/pelvis: There is no suspicious hepatic lesion. The gallbladder is unremarkable. No significant biliary ductal dilation. The spleen, pancreas, and bilateral adrenal glands are unchanged. As compared to 3/28/2024 PET/CT, there is edematous appearance of the bilateral kidneys with extensive bilateral perinephric fat stranding. No hydronephrosis or nephrolithiasis. Small hiatal hernia. No evidence of bowel obstruction. No suspicious abdominal or pelvic lymphadenopathy. No abdominal aortic aneurysm. Atherosclerotic calcifications of the aorta and branch vessels. There is circumferential wall thickening of the urinary bladder. The prostate is enlarged with prostatic calcifications. Abdominal and pelvic wall soft tissues show no acute abnormality. Small bilateral fat-containing inguinal hernias. Redemonstrated right lateral fifth and sixth rib fractures which are healing. There is no evidence of acute fracture or  suspicious osseous lesion.     Impression: Impression: Findings concerning for bilateral pyelonephritis. Circumferential wall thickening of the urinary bladder which can be seen with cystitis. No evidence of abscess formation, hydronephrosis, or other complication. Additional chronic findings as above including surgical changes of the right lung and healing right lateral fifth and sixth rib fractures. Small hiatal hernia. Previously seen mildly prominent hypermetabolic mediastinal and right supraclavicular lymph nodes are unchanged. Electronically Signed: Raulito Light MD  4/12/2024 8:22 PM EDT  Workstation ID: [id number]    XR Chest 1 View    Result Date: 4/12/2024  XR CHEST 1 VW Date of Exam: 4/12/2024 6:56 PM EDT Indication: Weak/Dizzy/AMS triage protocol Comparison: 2/21/2024 CT Findings: Right chest wall cardiac device distal lead tips in unchanged position. Left chest wall port with distal lead tip overlying the superior vena cava. There is a chronic small right-sided pleural effusion. No evidence of left pleural effusion. No new focal airspace consolidation. No pneumothorax. No acute osseous abnormality.     Impression: Impression: No acute cardiopulmonary abnormality. Chronic findings as above. Electronically Signed: Raulito Light MD  4/12/2024 7:26 PM EDT  Workstation ID: [id number]     Results for orders placed during the hospital encounter of 07/09/21    Adult Transthoracic Echo Complete W/ Cont if Necessary Per Protocol    Interpretation Summary  · Estimated left ventricular EF = 55% Left ventricular systolic function is normal.  · Left ventricular diastolic function was normal.  · Left ventricular wall thickness is consistent with mild concentric hypertrophy.  · Trace mitral and tricuspid regurgitation.      Assessment & Plan   Assessment & Plan       Sepsis    Presence of cardiac pacemaker    Mixed hyperlipidemia    Centrilobular emphysema    Tobacco abuse    Primary hypertension    Acute  pyelonephritis    David Barfield is a 74 y.o. male with a PMH significant for non-small cell lung cancer s/p neoadjuvant chemoimmunotherapy and RLL lobectomy (1/2024) currently on Opdivo (last tx 4/4/24), tobacco abuse, hypertension, emphysema who comes to the ED due to nausea and vomiting.    Sepsis  Acute pyelonephritis  Immunosuppressed  - Tachycardic, elevated lactic acid, elevated procalcitonin, leukocytosis, source of infection, ARACELI, hypotension  - Given 30 cc/kg IVF bolus in ED with persistent hypotension.  Given another 2 L NS now.  If patient remains hypotensive will need to go to the ICU for pressor support  - Ertapenem, vancomycin  - N/V/D, check C. difficile and GI panel  - Lactic acid trending down    ARACELI, POA  -- Baseline creatinine 0.94  - IV fluids  -- Acute urinary retention protocol  --urine sodium/urea nitrogen, urine creatinine  -- CT abdomen and pelvis above  --consider nephrology consult in am if no improvement with IV fluids  --Hold nephrotoxic medications  --A.m. labs    Non-small cell lung cancer  - Follows with Dr. Gely Ashley  - Last treatment with Opdivo 4/4/2024    Hypokalemia  - Check magnesium  - Replace per protocol    Hypertension  - Hold home lisinopril due to hypotension and ARACELI    DVT prophylaxis:  No DVT prophylaxis order currently exists.          CODE STATUS:    There are no questions and answers to display.       Expected Discharge   Expected discharge date/ time has not been documented.     Rosalia Mao,   04/12/24

## 2024-04-13 NOTE — CONSULTS
INFECTIOUS DISEASE CONSULT/INITIAL HOSPITAL VISIT    David Barfield  1950  [id number]    Date of Consult: 4/13/2024    Admission Date: 4/12/2024      Requesting Provider: No ref. provider found  Evaluating Physician: Derian Barry MD    Reason for Consultation: sepsis     History of present illness:    David Barfield is a 74 y.o. male, with PMH NSCLC/RLL lobectomy (1/2024) currently on Opdivo, HTN, emphysema, seen today for sepsis. Last Opdivo was last Thursday,  presented to the ED with complaints of  nausea, vomiting, lower back pain, and diarrhea. He has been afebrile. Admitting labs with WBC 36.28, plt 291, LAC 6.7, PCT 4.88, Scr 2.23, ALT 15, AST 33, negative respiratory panel , and UA with TNTC WBCS. CXR with chronic findings.  CT concerning with bilateral pyelonephritis, circumferential wall thickening of the urinary bladder, no abscess, hydronephrosis. Started on Vancomycin , Ertapenem and we were consulted for evaluation and treatment.      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    Diabetes mellitus Borderline    Emphysema/COPD     Erectile disorder     GERD (gastroesophageal reflux disease)     History of chemotherapy     Hyperlipidemia     Hypertension 2019    Lung nodule     Mumps     Mumps     Pruritus     after bath    Slow to wake up after anesthesia     Wears dentures     upper only    Wears hearing aid in both ears     usually only wears right       Past Surgical History:   Procedure Laterality Date    BONE BIOPSY      broken bone surgery in his face    BRONCHOSCOPY THORACOTOMY Right 1/9/2024    Procedure: THORACOTOMY FOR LOWER LOBECTOMY AND MEDISTINAL LYMPH NODE DISSECTION RIGHT;  Surgeon: Joey Patel MD;  Location: [facility];  Service: Cardiothoracic;  Laterality: Right;    BRONCHOSCOPY WITH ION ROBOTIC ASSIST N/A 09/15/2023    Procedure: BRONCHOSCOPY  "NAVIGATION WITH ENDOBRONCHIAL ULTRASOUND AND ION ROBOT;  Surgeon: Octaviano Sampson MD;  Location:  [facility];  Service: Robotics - Pulmonary;  Laterality: N/A;  ion #6 - 0032  - 0015  Cath guide 0061    EBUS balloon removed and intact    CARDIAC ELECTROPHYSIOLOGY PROCEDURE N/A 08/17/2021    Procedure: Pacemaker DC new;  Surgeon: Kayy Box MD;  Location:  [facility];  Service: Cardiology;  Laterality: N/A;    FACIAL FRACTURE SURGERY      PACEMAKER IMPLANTATION         Family History   Problem Relation Age of Onset    Aneurysm Mother         brain    Dementia Father     Leukemia Sister     Heart disease Paternal Grandmother     Hypertension Paternal Grandfather        Social History     Socioeconomic History    Marital status: Significant Other    Number of children: 3   Tobacco Use    Smoking status: Former     Current packs/day: 0.50     Average packs/day: 0.5 packs/day for 56.3 years (28.6 ttl pk-yrs)     Types: Cigarettes     Start date: 1/1/1968    Smokeless tobacco: Never    Tobacco comments:     Pt states that he quit smoking on 1/8/24. The day before his sx.    Vaping Use    Vaping status: Never Used   Substance and Sexual Activity    Alcohol use: Never    Drug use: Never    Sexual activity: Defer     Partners: Female     Birth control/protection: None       Allergies   Allergen Reactions    Cymbalta [Duloxetine Hcl] GI Intolerance    Gabapentin Mental Status Change     Pt states that this medication \"makes him feel foolish in his head\".     Remeron [Mirtazapine] Other (See Comments)     Excess sedation    Toradol [Ketorolac Tromethamine] GI Intolerance     Projectile vomiting     Latex Other (See Comments)     Latex allergy     Tape Rash         Medication:    Current Facility-Administered Medications:     acetaminophen (TYLENOL) tablet 650 mg, 650 mg, Oral, Q4H PRN **OR** acetaminophen (TYLENOL) 160 MG/5ML oral solution 650 mg, 650 mg, Oral, Q4H PRN **OR** " acetaminophen (TYLENOL) suppository 650 mg, 650 mg, Rectal, Q4H PRN, Daylin, Rosalia G, DO    budesonide-formoterol (SYMBICORT) 160-4.5 MCG/ACT inhaler 2 puff, 2 puff, Inhalation, BID - RT, 2 puff at 04/13/24 0934 **AND** tiotropium (SPIRIVA RESPIMAT) 2.5 mcg/act aerosol solution inhaler, 2 puff, Inhalation, Daily - RT, Gladis Maoe G, DO, 2 puff at 04/13/24 0934    Calcium Replacement - Follow Nurse / BPA Driven Protocol, , Does not apply, PRN, Daylin, Roslaia G, DO    [START ON 4/14/2024] cefepime 2000 mg IVPB in 100 mL NS (MBP), 2,000 mg, Intravenous, Daily, Derian Barry MD    heparin (porcine) 5000 UNIT/ML injection 5,000 Units, 5,000 Units, Subcutaneous, Q8H, DaylinTatiana coonsie G, DO, 5,000 Units at 04/13/24 1420    HYDROcodone-acetaminophen (NORCO) 7.5-325 MG per tablet 1 tablet, 1 tablet, Oral, Q6H PRN, Daylin, Rosalia G, DO, 1 tablet at 04/13/24 1827    Magnesium Standard Dose Replacement - Follow Nurse / BPA Driven Protocol, , Does not apply, PRN, Daylin, Rosalia G, DO    nicotine (NICODERM CQ) 21 MG/24HR patch 1 patch, 1 patch, Transdermal, Q24H, Daylin, Rosalia G, DO, 1 patch at 04/13/24 0533    nicotine polacrilex (NICORETTE) gum 4 mg, 4 mg, Mouth/Throat, Q1H PRN, Daylin, Rosalia G, DO    ondansetron (ZOFRAN) injection 4 mg, 4 mg, Intravenous, Q6H PRN, Daylin, Rosalia G, DO    ondansetron ODT (ZOFRAN-ODT) disintegrating tablet 4 mg, 4 mg, Oral, Q6H PRN **OR** ondansetron (ZOFRAN) injection 4 mg, 4 mg, Intravenous, Q6H PRN, Daylin, Rosalia G, DO    [Held by provider] pantoprazole (PROTONIX) EC tablet 40 mg, 40 mg, Oral, Q AM, Tatiana Maosie G, DO, 40 mg at 04/13/24 0532    Pharmacy Consult, , Does not apply, Continuous PRN, Nelli Simon APRN    pravastatin (PRAVACHOL) tablet 80 mg, 80 mg, Oral, Nightly, Tatiana Maosie G, DO, 80 mg at 04/13/24 0156    prochlorperazine (COMPAZINE) tablet 5 mg, 5 mg, Oral, Q6H PRN, Tatiana Maosie G, DO    sodium bicarbonate 8.4 % 150 mEq in dextrose (D5W) 5 % 1,000 mL infusion  (greater than 100 mEq), 150 mEq, Intravenous, Continuous, Jeronimo Clarke MD, Last Rate: 100 mL/hr at 04/13/24 1420, 150 mEq at 04/13/24 1420    sodium chloride 0.9 % flush 10 mL, 10 mL, Intravenous, PRN, Daylin, Rosalia G, DO    sodium chloride 0.9 % flush 10 mL, 10 mL, Intravenous, Q12H, Daylin, Rosalia G, DO    sodium chloride 0.9 % flush 10 mL, 10 mL, Intravenous, PRN, Daylin, Rosalia G, DO    sodium chloride 0.9 % infusion 40 mL, 40 mL, Intravenous, PRN, Daylin, Rosalia G, DO    tamsulosin (FLOMAX) 24 hr capsule 0.4 mg, 0.4 mg, Oral, Daily, Daylin, Rosalia G, DO, 0.4 mg at 04/13/24 0947    Antibiotics:  Anti-Infectives (From admission, onward)      Ordered     Dose/Rate Route Frequency Start Stop    04/13/24 1025  cefepime 2000 mg IVPB in 100 mL NS (MBP)        Ordering Provider: Derian Barry MD    2,000 mg  over 4 Hours Intravenous Daily 04/14/24 0900 04/28/24 0859    04/13/24 1025  cefepime 2000 mg IVPB in 100 mL NS (MBP)        Ordering Provider: Derian Barry MD    2,000 mg  over 30 Minutes Intravenous Once 04/13/24 1115 04/13/24 1301    04/12/24 1922  vancomycin IVPB 1750 mg in 0.9% Sodium Chloride (premix) 500 mL        Ordering Provider: Michelle Iyer PA-C    22 mg/kg × 78 kg  285.7 mL/hr over 105 Minutes Intravenous Once 04/12/24 2000 04/12/24 2335    04/12/24 1922  piperacillin-tazobactam (ZOSYN) 3.375 g IVPB in 100 mL NS MBP (CD)        Ordering Provider: Michelle Iyer PA-C    3.375 g  over 30 Minutes Intravenous Once 04/12/24 1930 04/12/24 2148              Review of Systems:  Constitutional-- No Fever, + chills   Appetite good, and no malaise. + fatigue.  HEENT-- No new vision, hearing or throat complaints.  No epistaxis or oral sores.  Denies odynophagia or dysphagia. No headache, photophobia or neck stiffness.  CV-- No chest pain, palpitation or syncope  Resp-- No SOB/cough/Hemoptysis  GI-+ nausea, vomiting, diarrhea.  No hematochezia, melena, or hematemesis. Denies jaundice or chronic liver  disease.  -- No dysuria, hematuria, Denies urinary urgency.  Not urinating much since arrival to the hospital. + Increased urinary frequency. + Left greater than right sided flank pain  Heme- No active bruising or bleeding; no Hx of DVT or PE.  MS-- no swelling or pain in the bones or joints of arms/legs.  Noting pain over his spine.  Neuro-- No acute focal weakness or numbness in the arms or legs.  No seizures.  Skin--No rashes or lesions      Physical Exam:   Vital Signs  Temp (24hrs), Av.1 °F (36.7 °C), Min:97.6 °F (36.4 °C), Max:98.4 °F (36.9 °C)    Temp  Min: 97.6 °F (36.4 °C)  Max: 98.4 °F (36.9 °C)  BP  Min: 71/51  Max: 131/66  Pulse  Min: 70  Max: 95  Resp  Min: 16  Max: 18  SpO2  Min: 91 %  Max: 100 %    GENERAL: Awake and alert, in no acute distress. Sitting up on bedside  HEENT: Normocephalic, atraumatic.  PERRL. No conjunctival injection. No icterus. Oropharynx clear without evidence of thrush or exudate. No evidence of periodontal disease.    NECK: supple   HEART: RRR; No murmur, rubs, gallops.   LUNGS: Clear to auscultation bilaterally without wheezing, rales, rhonchi. Normal respiratory effort. Nonlabored.   ABDOMEN: Soft, nontender, nondistended. Positive bowel sounds. No rebound or guarding.   EXT:  No cyanosis, clubbing or edema. No cord.  :  Without Yeung catheter.  MSK: No joint effusions or erythema. Left-sided CVA tenderness on exam.  No right-sided CVA tenderness on exam.  SKIN: No generalized rashes noted.  No peripheral stigmata of infective endocarditis noted.   NEURO: Oriented to PPT.  Normal speech and cognition.  PSYCHIATRIC: Normal insight and judgment. Cooperative with PE  Right PPM site without redness, tenderness  Left PAC site without redness     Laboratory Data    Results from last 7 days   Lab Units 24  0158 24  1905 04/10/24  1355   WBC 10*3/mm3 46.05* 36.28* 13.92*   HEMOGLOBIN g/dL 9.0* 12.4* 11.2*   HEMATOCRIT % 28.9* 40.0 34.5*   PLATELETS 10*3/mm3 185 291  396     Results from last 7 days   Lab Units 04/13/24  1043 04/13/24  0330   SODIUM mmol/L  --  140   POTASSIUM mmol/L 5.7* 4.7   CHLORIDE mmol/L  --  113*   CO2 mmol/L  --  16.0*   BUN mg/dL  --  25*   CREATININE mg/dL  --  2.73*   GLUCOSE mg/dL  --  104*   CALCIUM mg/dL  --  7.9*     Results from last 7 days   Lab Units 04/13/24  0330 04/12/24  1905 04/10/24  1355   ALK PHOS U/L 50   < > 95   BILIRUBIN mg/dL 0.2   < > 0.2   BILIRUBIN DIRECT mg/dL  --   --  <0.2   ALT (SGPT) U/L 10   < > 19   AST (SGOT) U/L 21   < > 24    < > = values in this interval not displayed.             Results from last 7 days   Lab Units 04/13/24  1611   LACTATE mmol/L 2.5*     Results from last 7 days   Lab Units 04/13/24  1043   CK TOTAL U/L 33         Estimated Creatinine Clearance: 25.8 mL/min (A) (by C-G formula based on SCr of 2.73 mg/dL (H)).      Microbiology:  Blood culture ×2: In progress    Urine culture: In progress    Respiratory PCR panel: Negative      Radiology:  Imaging Results (Last 72 Hours)       Procedure Component Value Units Date/Time    CT Abdomen Pelvis Without Contrast [[id number]] Collected: 04/12/24 2010     Updated: 04/12/24 2025    Narrative:      CT ABDOMEN PELVIS WO CONTRAST, CT CHEST WO CONTRAST DIAGNOSTIC    Date of Exam: 4/12/2024 8:00 PM EDT    Indication: Sepsis.    Comparison: 3/28/2024 PET/CT;     Technique: Axial CT images were obtained of the chest, abdomen and pelvis without the administration of contrast. Reconstructed coronal and sagittal images were also obtained. Automated exposure control and iterative construction methods were used.      Findings:  CT chest:    The central airways are patent. Redemonstrated postsurgical changes of the right lung with medial inferior scarring and small adjacent loculated pleural effusion. There is background emphysematous changes with biapical pleural-parenchymal scarring.   Scattered calcified granulomas. No suspicious pulmonary nodule or mass. No focal  airspace consolidation.    Right chest wall cardiac device distal lead tips in unchanged position. Left chest wall port with distal lead tip overlying the superior vena cava. The heart is unchanged in size. Coronary artery calcifications. No pericardial effusion. No mediastinal   mass. Prominent mediastinal lymph nodes. Prominent right supraclavicular lymph node.    Chest wall soft tissues show no acute abnormality.        CT abdomen/pelvis:    There is no suspicious hepatic lesion. The gallbladder is unremarkable. No significant biliary ductal dilation.    The spleen, pancreas, and bilateral adrenal glands are unchanged.    As compared to 3/28/2024 PET/CT, there is edematous appearance of the bilateral kidneys with extensive bilateral perinephric fat stranding. No hydronephrosis or nephrolithiasis.    Small hiatal hernia. No evidence of bowel obstruction.    No suspicious abdominal or pelvic lymphadenopathy. No abdominal aortic aneurysm. Atherosclerotic calcifications of the aorta and branch vessels.    There is circumferential wall thickening of the urinary bladder. The prostate is enlarged with prostatic calcifications.    Abdominal and pelvic wall soft tissues show no acute abnormality. Small bilateral fat-containing inguinal hernias. Redemonstrated right lateral fifth and sixth rib fractures which are healing. There is no evidence of acute fracture or suspicious osseous   lesion.      Impression:      Impression:  Findings concerning for bilateral pyelonephritis. Circumferential wall thickening of the urinary bladder which can be seen with cystitis. No evidence of abscess formation, hydronephrosis, or other complication.    Additional chronic findings as above including surgical changes of the right lung and healing right lateral fifth and sixth rib fractures.    Small hiatal hernia.    Previously seen mildly prominent hypermetabolic mediastinal and right supraclavicular lymph nodes are  unchanged.            Electronically Signed: Raulito Light MD    4/12/2024 8:22 PM EDT    Workstation ID: [id number]    CT Chest Without Contrast Diagnostic [[id number]] Collected: 04/12/24 2010     Updated: 04/12/24 2025    Narrative:      CT ABDOMEN PELVIS WO CONTRAST, CT CHEST WO CONTRAST DIAGNOSTIC    Date of Exam: 4/12/2024 8:00 PM EDT    Indication: Sepsis.    Comparison: 3/28/2024 PET/CT;     Technique: Axial CT images were obtained of the chest, abdomen and pelvis without the administration of contrast. Reconstructed coronal and sagittal images were also obtained. Automated exposure control and iterative construction methods were used.      Findings:  CT chest:    The central airways are patent. Redemonstrated postsurgical changes of the right lung with medial inferior scarring and small adjacent loculated pleural effusion. There is background emphysematous changes with biapical pleural-parenchymal scarring.   Scattered calcified granulomas. No suspicious pulmonary nodule or mass. No focal airspace consolidation.    Right chest wall cardiac device distal lead tips in unchanged position. Left chest wall port with distal lead tip overlying the superior vena cava. The heart is unchanged in size. Coronary artery calcifications. No pericardial effusion. No mediastinal   mass. Prominent mediastinal lymph nodes. Prominent right supraclavicular lymph node.    Chest wall soft tissues show no acute abnormality.        CT abdomen/pelvis:    There is no suspicious hepatic lesion. The gallbladder is unremarkable. No significant biliary ductal dilation.    The spleen, pancreas, and bilateral adrenal glands are unchanged.    As compared to 3/28/2024 PET/CT, there is edematous appearance of the bilateral kidneys with extensive bilateral perinephric fat stranding. No hydronephrosis or nephrolithiasis.    Small hiatal hernia. No evidence of bowel obstruction.    No suspicious abdominal or pelvic lymphadenopathy. No abdominal  aortic aneurysm. Atherosclerotic calcifications of the aorta and branch vessels.    There is circumferential wall thickening of the urinary bladder. The prostate is enlarged with prostatic calcifications.    Abdominal and pelvic wall soft tissues show no acute abnormality. Small bilateral fat-containing inguinal hernias. Redemonstrated right lateral fifth and sixth rib fractures which are healing. There is no evidence of acute fracture or suspicious osseous   lesion.      Impression:      Impression:  Findings concerning for bilateral pyelonephritis. Circumferential wall thickening of the urinary bladder which can be seen with cystitis. No evidence of abscess formation, hydronephrosis, or other complication.    Additional chronic findings as above including surgical changes of the right lung and healing right lateral fifth and sixth rib fractures.    Small hiatal hernia.    Previously seen mildly prominent hypermetabolic mediastinal and right supraclavicular lymph nodes are unchanged.            Electronically Signed: Raulito Light MD    4/12/2024 8:22 PM EDT    Workstation ID: [id number]    XR Chest 1 View [[id number]] Collected: 04/12/24 1924     Updated: 04/12/24 1929    Narrative:      XR CHEST 1 VW    Date of Exam: 4/12/2024 6:56 PM EDT    Indication: Weak/Dizzy/AMS triage protocol    Comparison: 2/21/2024 CT    Findings:  Right chest wall cardiac device distal lead tips in unchanged position. Left chest wall port with distal lead tip overlying the superior vena cava. There is a chronic small right-sided pleural effusion. No evidence of left pleural effusion. No new focal   airspace consolidation. No pneumothorax. No acute osseous abnormality.      Impression:      Impression:  No acute cardiopulmonary abnormality. Chronic findings as above.      Electronically Signed: Raulito Light MD    4/12/2024 7:26 PM EDT    Workstation ID: [id number]          I (Dr. Barry) independently read the patient's radiographs-Does  have extensive bilateral perinephric fat stranding but no hydronephrosis or nephrolithiasis noted.    Impression:   Sepsis, manifested by leukocytosis, acute kidney injury, hypotension, tachycardia, lactic acidosis -Suspect from urinary source. Could have bacteremia given presentation.  Pyuria/UTI/bilateral pyelonephritis per CT- urine cultures pending   Acute kidney injury-ATN in the setting of sepsis/hypotension  Severe leukocytosis with neutrophilia-worsening  NSCLC, s/p lobectomy, now on immunotherapy   Nausea and vomiting-likely secondary to pyelonephritis  Diarrhea, Cdiff pending  Hypophosphatemia  Macrocytic anemia     Nonsmall cell lung cancer of the RLL, Stage IIIA   PLAN/RECOMMENDATIONS:   Thank you for asking us to see David Barfield, I recommend the following:  - Cefepime per pharmacy dosing. This will provide coverage for Pseudomonas and other gram-negative rods.  No history of resistant bacteria per the patient's report and he has not had a lot of urinary tract infections in the past.  - DC Vancomycin, Ertapenem. Avoiding vancomycin especially in the setting of his renal dysfunction and low suspicion for MRSA.  No history of ESBL infection in the past per his report.  - Cdiff PCR pending. Patient endorses only one episode of diarrhea so C. Difficile infection seems somewhat less likely.   - Follow urine and blood cultures  - Continue to follow CBC and CMP closely  - Low threshold for bladder scan or post void residual within an out catheter if he does not urinate a significant amount in the near future.  Was not retaining much on his last and now catheterization and no hydronephrosis on imaging.    Dr Barry has obtained the history, performed the PE, formulated the above treatment plan      I (Dr. Barry) independently obtained history, performed a physical exam, and formulated the above assessment and plan.  I reviewed the patient's labs, micro, radiographs, and medications today.  I edited  the above note to reflect my findings.  I discussed in length with the patient and his wife at bedside today.    Complex MDM    Derian Barry MD  4/13/2024  19:26 EDT

## 2024-04-14 LAB
ADV 40+41 DNA STL QL NAA+NON-PROBE: NOT DETECTED
ALBUMIN SERPL-MCNC: 2.5 G/DL (ref 3.5–5.2)
ALBUMIN/GLOB SERPL: 0.7 G/DL
ALP SERPL-CCNC: 61 U/L (ref 39–117)
ALT SERPL W P-5'-P-CCNC: 9 U/L (ref 1–41)
ANION GAP SERPL CALCULATED.3IONS-SCNC: 12 MMOL/L (ref 5–15)
AST SERPL-CCNC: 15 U/L (ref 1–40)
ASTRO TYP 1-8 RNA STL QL NAA+NON-PROBE: NOT DETECTED
BACTERIA SPEC AEROBE CULT: NO GROWTH
BASOPHILS # BLD AUTO: 0.06 10*3/MM3 (ref 0–0.2)
BASOPHILS NFR BLD AUTO: 0.3 % (ref 0–1.5)
BILIRUB SERPL-MCNC: <0.2 MG/DL (ref 0–1.2)
BUN SERPL-MCNC: 39 MG/DL (ref 8–23)
BUN/CREAT SERPL: 8.5 (ref 7–25)
C CAYETANENSIS DNA STL QL NAA+NON-PROBE: NOT DETECTED
C COLI+JEJ+UPSA DNA STL QL NAA+NON-PROBE: NOT DETECTED
C DIFF TOX GENS STL QL NAA+PROBE: NOT DETECTED
CALCIUM SPEC-SCNC: 7.7 MG/DL (ref 8.6–10.5)
CHLORIDE SERPL-SCNC: 101 MMOL/L (ref 98–107)
CO2 SERPL-SCNC: 20 MMOL/L (ref 22–29)
CREAT SERPL-MCNC: 4.58 MG/DL (ref 0.76–1.27)
CRYPTOSP DNA STL QL NAA+NON-PROBE: NOT DETECTED
DEPRECATED RDW RBC AUTO: 55.9 FL (ref 37–54)
E HISTOLYT DNA STL QL NAA+NON-PROBE: NOT DETECTED
EAEC PAA PLAS AGGR+AATA ST NAA+NON-PRB: NOT DETECTED
EC STX1+STX2 GENES STL QL NAA+NON-PROBE: NOT DETECTED
EGFRCR SERPLBLD CKD-EPI 2021: 12.6 ML/MIN/1.73
EOSINOPHIL # BLD AUTO: 0.26 10*3/MM3 (ref 0–0.4)
EOSINOPHIL NFR BLD AUTO: 1.1 % (ref 0.3–6.2)
EPEC EAE GENE STL QL NAA+NON-PROBE: NOT DETECTED
ERYTHROCYTE [DISTWIDTH] IN BLOOD BY AUTOMATED COUNT: 15.6 % (ref 12.3–15.4)
ETEC LTA+ST1A+ST1B TOX ST NAA+NON-PROBE: NOT DETECTED
G LAMBLIA DNA STL QL NAA+NON-PROBE: NOT DETECTED
GLOBULIN UR ELPH-MCNC: 3.5 GM/DL
GLUCOSE SERPL-MCNC: 95 MG/DL (ref 65–99)
HCT VFR BLD AUTO: 27.2 % (ref 37.5–51)
HGB BLD-MCNC: 8.7 G/DL (ref 13–17.7)
IMM GRANULOCYTES # BLD AUTO: 0.22 10*3/MM3 (ref 0–0.05)
IMM GRANULOCYTES NFR BLD AUTO: 1 % (ref 0–0.5)
LYMPHOCYTES # BLD AUTO: 1.46 10*3/MM3 (ref 0.7–3.1)
LYMPHOCYTES NFR BLD AUTO: 6.5 % (ref 19.6–45.3)
MAGNESIUM SERPL-MCNC: 2.6 MG/DL (ref 1.6–2.4)
MCH RBC QN AUTO: 31.3 PG (ref 26.6–33)
MCHC RBC AUTO-ENTMCNC: 32 G/DL (ref 31.5–35.7)
MCV RBC AUTO: 97.8 FL (ref 79–97)
MONOCYTES # BLD AUTO: 1.28 10*3/MM3 (ref 0.1–0.9)
MONOCYTES NFR BLD AUTO: 5.7 % (ref 5–12)
NEUTROPHILS NFR BLD AUTO: 19.33 10*3/MM3 (ref 1.7–7)
NEUTROPHILS NFR BLD AUTO: 85.4 % (ref 42.7–76)
NOROVIRUS GI+II RNA STL QL NAA+NON-PROBE: NOT DETECTED
NRBC BLD AUTO-RTO: 0 /100 WBC (ref 0–0.2)
P SHIGELLOIDES DNA STL QL NAA+NON-PROBE: NOT DETECTED
PHOSPHATE SERPL-MCNC: 2.8 MG/DL (ref 2.5–4.5)
PLATELET # BLD AUTO: 176 10*3/MM3 (ref 140–450)
PMV BLD AUTO: 8.9 FL (ref 6–12)
POTASSIUM SERPL-SCNC: 4.4 MMOL/L (ref 3.5–5.2)
PROT SERPL-MCNC: 6 G/DL (ref 6–8.5)
RBC # BLD AUTO: 2.78 10*6/MM3 (ref 4.14–5.8)
RVA RNA STL QL NAA+NON-PROBE: NOT DETECTED
S ENT+BONG DNA STL QL NAA+NON-PROBE: NOT DETECTED
SAPO I+II+IV+V RNA STL QL NAA+NON-PROBE: NOT DETECTED
SHIGELLA SP+EIEC IPAH ST NAA+NON-PROBE: NOT DETECTED
SODIUM SERPL-SCNC: 133 MMOL/L (ref 136–145)
V CHOL+PARA+VUL DNA STL QL NAA+NON-PROBE: NOT DETECTED
V CHOLERAE DNA STL QL NAA+NON-PROBE: NOT DETECTED
WBC NRBC COR # BLD AUTO: 22.61 10*3/MM3 (ref 3.4–10.8)
Y ENTEROCOL DNA STL QL NAA+NON-PROBE: NOT DETECTED

## 2024-04-14 PROCEDURE — 99232 SBSQ HOSP IP/OBS MODERATE 35: CPT | Performed by: STUDENT IN AN ORGANIZED HEALTH CARE EDUCATION/TRAINING PROGRAM

## 2024-04-14 PROCEDURE — 0 DEXTROSE 5 % SOLUTION 1,000 ML FLEX CONT: Performed by: INTERNAL MEDICINE

## 2024-04-14 PROCEDURE — 94799 UNLISTED PULMONARY SVC/PX: CPT

## 2024-04-14 PROCEDURE — 80053 COMPREHEN METABOLIC PANEL: CPT | Performed by: STUDENT IN AN ORGANIZED HEALTH CARE EDUCATION/TRAINING PROGRAM

## 2024-04-14 PROCEDURE — 87507 IADNA-DNA/RNA PROBE TQ 12-25: CPT | Performed by: INTERNAL MEDICINE

## 2024-04-14 PROCEDURE — 97161 PT EVAL LOW COMPLEX 20 MIN: CPT

## 2024-04-14 PROCEDURE — 25010000002 FUROSEMIDE PER 20 MG: Performed by: STUDENT IN AN ORGANIZED HEALTH CARE EDUCATION/TRAINING PROGRAM

## 2024-04-14 PROCEDURE — 25010000002 HEPARIN (PORCINE) PER 1000 UNITS: Performed by: INTERNAL MEDICINE

## 2024-04-14 PROCEDURE — 25010000002 CEFEPIME PER 500 MG: Performed by: INTERNAL MEDICINE

## 2024-04-14 PROCEDURE — 83735 ASSAY OF MAGNESIUM: CPT | Performed by: INTERNAL MEDICINE

## 2024-04-14 PROCEDURE — 85025 COMPLETE CBC W/AUTO DIFF WBC: CPT | Performed by: STUDENT IN AN ORGANIZED HEALTH CARE EDUCATION/TRAINING PROGRAM

## 2024-04-14 PROCEDURE — 84100 ASSAY OF PHOSPHORUS: CPT | Performed by: STUDENT IN AN ORGANIZED HEALTH CARE EDUCATION/TRAINING PROGRAM

## 2024-04-14 PROCEDURE — 87493 C DIFF AMPLIFIED PROBE: CPT | Performed by: INTERNAL MEDICINE

## 2024-04-14 RX ORDER — ALBUMIN (HUMAN) 12.5 G/50ML
12.5 SOLUTION INTRAVENOUS AS NEEDED
Status: CANCELLED | OUTPATIENT
Start: 2024-04-14 | End: 2024-04-15

## 2024-04-14 RX ORDER — FUROSEMIDE 10 MG/ML
80 INJECTION INTRAMUSCULAR; INTRAVENOUS ONCE
Status: COMPLETED | OUTPATIENT
Start: 2024-04-14 | End: 2024-04-14

## 2024-04-14 RX ADMIN — BUDESONIDE AND FORMOTEROL FUMARATE DIHYDRATE 2 PUFF: 160; 4.5 AEROSOL RESPIRATORY (INHALATION) at 18:59

## 2024-04-14 RX ADMIN — PRAVASTATIN SODIUM 80 MG: 40 TABLET ORAL at 20:39

## 2024-04-14 RX ADMIN — HEPARIN SODIUM 5000 UNITS: 5000 INJECTION INTRAVENOUS; SUBCUTANEOUS at 20:39

## 2024-04-14 RX ADMIN — TIOTROPIUM BROMIDE INHALATION SPRAY 2 PUFF: 3.12 SPRAY, METERED RESPIRATORY (INHALATION) at 09:37

## 2024-04-14 RX ADMIN — CEFEPIME 2000 MG: 2 INJECTION, POWDER, FOR SOLUTION INTRAVENOUS at 08:30

## 2024-04-14 RX ADMIN — Medication 1 PATCH: at 05:58

## 2024-04-14 RX ADMIN — FUROSEMIDE 80 MG: 10 INJECTION, SOLUTION INTRAMUSCULAR; INTRAVENOUS at 10:17

## 2024-04-14 RX ADMIN — Medication 10 ML: at 08:31

## 2024-04-14 RX ADMIN — TAMSULOSIN HYDROCHLORIDE 0.4 MG: 0.4 CAPSULE ORAL at 08:30

## 2024-04-14 RX ADMIN — Medication 10 ML: at 20:39

## 2024-04-14 RX ADMIN — BUDESONIDE AND FORMOTEROL FUMARATE DIHYDRATE 2 PUFF: 160; 4.5 AEROSOL RESPIRATORY (INHALATION) at 09:36

## 2024-04-14 RX ADMIN — HEPARIN SODIUM 5000 UNITS: 5000 INJECTION INTRAVENOUS; SUBCUTANEOUS at 14:11

## 2024-04-14 RX ADMIN — SODIUM BICARBONATE 150 MEQ: 84 INJECTION, SOLUTION INTRAVENOUS at 00:19

## 2024-04-14 RX ADMIN — HEPARIN SODIUM 5000 UNITS: 5000 INJECTION INTRAVENOUS; SUBCUTANEOUS at 05:56

## 2024-04-14 NOTE — PLAN OF CARE
Goal Outcome Evaluation:  Plan of Care Reviewed With: patient, daughter        Progress: no change  Outcome Evaluation: Pt presents with decreased functional mobility and decreased endurance with activity. Pt ambulated 150ft with CGA and LUE pushing IV pole. Recommend continued skilled IP PT interventions. Recommend D/C home with assist with HHPT when medically appropriate.      Anticipated Discharge Disposition (PT): home with assist, home with home health

## 2024-04-14 NOTE — PROGRESS NOTES
" LOS: 2 days   Patient Care Team:  Shahid Drake MD as PCP - General (Family Medicine)  Octaviano Sampson MD as Consulting Physician (Pulmonary Disease)  Neetu Ashley MD as Referring Physician (Hematology and Oncology)  Nimo Rodríguez MD as Consulting Physician (Radiation Oncology)    Chief Complaint: ARACELI     Subjective         Subjective:  Symptoms:  Worsening.  No shortness of breath.    Diet:  Poor intake.        History taken from: patient    Objective     Vital Sign Min/Max for last 24 hours  Temp  Min: 97.5 °F (36.4 °C)  Max: 99.1 °F (37.3 °C)   BP  Min: 104/54  Max: 150/72   Pulse  Min: 70  Max: 84   Resp  Min: 16  Max: 16   SpO2  Min: 90 %  Max: 99 %   No data recorded   No data recorded     Flowsheet Rows      Flowsheet Row First Filed Value   Admission Height 182.9 cm (72\") Documented at 04/12/2024 1854   Admission Weight 78 kg (172 lb) Documented at 04/12/2024 1854            I/O this shift:  In: 600 [P.O.:600]  Out: 50 [Urine:50]  I/O last 3 completed shifts:  In: 1200 [P.O.:1200]  Out: 225 [Urine:225]    Objective:  General Appearance:  Comfortable.    Vital signs: (most recent): Blood pressure 140/80, pulse 72, temperature 98 °F (36.7 °C), temperature source Oral, resp. rate 16, height 182.9 cm (72\"), weight 78 kg (172 lb), SpO2 93%.  Vital signs are normal.    Output: Minimal urine output.    HEENT: Normal HEENT exam.    Lungs:  Normal effort.  There are rales.    Heart: Normal rate.  Regular rhythm.  S1 normal and S2 normal.  No murmur or gallop.   Abdomen: Abdomen is soft.                Results Review:     I reviewed the patient's new clinical results.    WBC WBC   Date Value Ref Range Status   04/14/2024 22.61 (H) 3.40 - 10.80 10*3/mm3 Final   04/13/2024 46.05 (C) 3.40 - 10.80 10*3/mm3 Final   04/12/2024 36.28 (C) 3.40 - 10.80 10*3/mm3 Final      HGB Hemoglobin   Date Value Ref Range Status   04/14/2024 8.7 (L) 13.0 - 17.7 g/dL Final   04/13/2024 9.0 (L) 13.0 - 17.7 g/dL Final   04/12/2024 " "12.4 (L) 13.0 - 17.7 g/dL Final      HCT Hematocrit   Date Value Ref Range Status   04/14/2024 27.2 (L) 37.5 - 51.0 % Final   04/13/2024 28.9 (L) 37.5 - 51.0 % Final   04/12/2024 40.0 37.5 - 51.0 % Final      Platlets No results found for: \"LABPLAT\"   MCV MCV   Date Value Ref Range Status   04/14/2024 97.8 (H) 79.0 - 97.0 fL Final   04/13/2024 99.3 (H) 79.0 - 97.0 fL Final   04/12/2024 97.3 (H) 79.0 - 97.0 fL Final          Sodium Sodium   Date Value Ref Range Status   04/14/2024 133 (L) 136 - 145 mmol/L Final   04/13/2024 140 136 - 145 mmol/L Final   04/12/2024 142 136 - 145 mmol/L Final      Potassium Potassium   Date Value Ref Range Status   04/14/2024 4.4 3.5 - 5.2 mmol/L Final   04/13/2024 4.5 3.5 - 5.2 mmol/L Final   04/13/2024 5.7 (H) 3.5 - 5.2 mmol/L Final   04/13/2024 4.7 3.5 - 5.2 mmol/L Final   04/12/2024 3.4 (L) 3.5 - 5.2 mmol/L Final      Chloride Chloride   Date Value Ref Range Status   04/14/2024 101 98 - 107 mmol/L Final   04/13/2024 113 (H) 98 - 107 mmol/L Final   04/12/2024 105 98 - 107 mmol/L Final      CO2 CO2   Date Value Ref Range Status   04/14/2024 20.0 (L) 22.0 - 29.0 mmol/L Final   04/13/2024 16.0 (L) 22.0 - 29.0 mmol/L Final   04/12/2024 21.0 (L) 22.0 - 29.0 mmol/L Final      BUN BUN   Date Value Ref Range Status   04/14/2024 39 (H) 8 - 23 mg/dL Final   04/13/2024 25 (H) 8 - 23 mg/dL Final   04/12/2024 22 8 - 23 mg/dL Final      Creatinine Creatinine   Date Value Ref Range Status   04/14/2024 4.58 (H) 0.76 - 1.27 mg/dL Final   04/13/2024 2.73 (H) 0.76 - 1.27 mg/dL Final   04/12/2024 2.23 (H) 0.76 - 1.27 mg/dL Final      Calcium Calcium   Date Value Ref Range Status   04/14/2024 7.7 (L) 8.6 - 10.5 mg/dL Final   04/13/2024 7.9 (L) 8.6 - 10.5 mg/dL Final   04/12/2024 9.2 8.6 - 10.5 mg/dL Final      PO4 No results found for: \"CAPO4\"   Albumin Albumin   Date Value Ref Range Status   04/14/2024 2.5 (L) 3.5 - 5.2 g/dL Final   04/13/2024 2.4 (L) 3.5 - 5.2 g/dL Final   04/12/2024 3.6 3.5 - 5.2 g/dL " "Final      Magnesium Magnesium   Date Value Ref Range Status   04/14/2024 2.6 (H) 1.6 - 2.4 mg/dL Final   04/13/2024 1.3 (L) 1.6 - 2.4 mg/dL Final   04/12/2024 1.7 1.6 - 2.4 mg/dL Final   04/12/2024 1.8 1.6 - 2.4 mg/dL Final      Uric Acid No results found for: \"URICACID\"     Medication Review: Yes    Assessment & Plan       Sepsis    Presence of cardiac pacemaker    Mixed hyperlipidemia    Centrilobular emphysema    Tobacco abuse    Primary hypertension    Acute pyelonephritis    ARACELI (acute kidney injury)    Hypokalemia      Assessment & Plan    Acute kidney injury:  Baseline cr ~ 0.8mg/dl. Cr on this admission 2.2-2.7mg/dl. UA large blood, trace protein, large aspen esterase many wbc++. CT showed perinephric fat stranding consistent with pyelonephritis. Urine na 74 urine cl 16.      Hyperkalemia: IN the setting of ARACELI and transcellular shift due to met acidosis     Met acidosis: Combination of gap and non gap acidosis in the setting of sepsis and ARACELI     Sepsis: Source of infection. Pyelonephritis. On broad spectrum abx. Urine and blood cx pending.      Hx of NSCLC: Recently restarted Opdivo first cycle few days ago. Previously on several other chemo agents including immunotherapy in the past. Underwent lobectomy in the Jan 2024.      Volume status: Low UOP at present. No significant LE edema     Recs  ARACELI likely tubulointerstitial inflammation in the setting of infection. 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 Other possibility hemodynamic injury due to ACEI I, nausea, vomiting and poor po intake.   D/C IV fluids.   Diuretics on PRN basis (  failed diuretic challenge)  Abx per primary service and ID  Strict I/O  Dose meds to eGFR.   At risk for needing dialysis in next 24hr to 48hr. NPO after midnight for possible HD.    Jeronimo Clarke MD  04/14/24  15:26 EDT            "

## 2024-04-14 NOTE — THERAPY EVALUATION
Patient Name: David Barfield  : 1950    MRN: [id number]                              Today's Date: 2024       Admit Date: 2024    Visit Dx:     ICD-10-CM ICD-9-CM   1. Sepsis with acute renal failure without septic shock, due to unspecified organism, unspecified acute renal failure type  A41.9 038.9    R65.20 995.92    N17.9 584.9   2. Pyelonephritis  N12 590.80   3. Nausea vomiting and diarrhea  R11.2 787.91    R19.7 787.01   4. Leukocytosis, unspecified type  D72.829 288.60   5. Lactic acidosis  E87.20 276.2   6. Generalized weakness  R53.1 780.79   7. Postural dizziness with near syncope  R42 780.4    R55 780.2   8. Bronchogenic cancer of right lung  C34.91 162.9   9. Immunosuppression  D84.9 279.9   10. Elevated procalcitonin  R79.89 790.99   11. History of emphysema  J43.9 492.8   12. History of diabetes mellitus  Z86.39 V12.29   13. History of hypertension  Z86.79 V12.59   14. Former smoker  Z87.891 V15.82     Patient Active Problem List   Diagnosis    High degree atrioventricular block    Bradycardia, sinus    Chronic hypotension    PVC's (premature ventricular contractions)    Presence of cardiac pacemaker    Mixed hyperlipidemia    Centrilobular emphysema    Nodule of lower lobe of right lung    Mediastinal adenopathy    Cough    Tobacco abuse    Bronchogenic cancer of right lung    Malignant neoplasm of lower lobe of right lung    Primary hypertension    GERD without esophagitis    Sepsis    Acute pyelonephritis    ARACELI (acute kidney injury)    Hypokalemia     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    Diabetes mellitus Borderline    Emphysema/COPD     Erectile disorder     GERD (gastroesophageal reflux disease)     History of chemotherapy     Hyperlipidemia     Hypertension 2019    Lung nodule     Mumps     Mumps     Pruritus     after bath    Slow to wake up after  anesthesia     Wears dentures     upper only    Wears hearing aid in both ears     usually only wears right     Past Surgical History:   Procedure Laterality Date    BONE BIOPSY      broken bone surgery in his face    BRONCHOSCOPY THORACOTOMY Right 1/9/2024    Procedure: THORACOTOMY FOR LOWER LOBECTOMY AND MEDISTINAL LYMPH NODE DISSECTION RIGHT;  Surgeon: Joey Patel MD;  Location:  [facility];  Service: Cardiothoracic;  Laterality: Right;    BRONCHOSCOPY WITH ION ROBOTIC ASSIST N/A 09/15/2023    Procedure: BRONCHOSCOPY NAVIGATION WITH ENDOBRONCHIAL ULTRASOUND AND ION ROBOT;  Surgeon: Octaviano Sampson MD;  Location:  [facility];  Service: Robotics - Pulmonary;  Laterality: N/A;  ion #6 - 0032  - 0015  Cath guide 0061    EBUS balloon removed and intact    CARDIAC ELECTROPHYSIOLOGY PROCEDURE N/A 08/17/2021    Procedure: Pacemaker DC new;  Surgeon: Kayy Box MD;  Location:  [facility]AS[facility] LOCATION;  Service: Cardiology;  Laterality: N/A;    FACIAL FRACTURE SURGERY      PACEMAKER IMPLANTATION        General Information       Row Name 04/14/24 1349          Physical Therapy Time and Intention    Document Type evaluation  -AE     Mode of Treatment physical therapy  -AE       Row Name 04/14/24 1349          General Information    Patient Profile Reviewed yes  -AE     Prior Level of Function independent:;all household mobility;community mobility;gait;transfer;bed mobility;ADL's;dressing;bathing  -AE     Existing Precautions/Restrictions fall;other (see comments)  RLL lobectomy (1/24)  -AE     Barriers to Rehab previous functional deficit  -AE       Row Name 04/14/24 1349          Living Environment    People in Home friend(s)  -AE       Row Name 04/14/24 1349          Home Main Entrance    Number of Stairs, Main Entrance one  -AE     Stair Railings, Main Entrance none  -AE       Row Name 04/14/24 1349          Stairs Within Home, Primary    Number of Stairs, Within Home, Primary none  -AE      Stair Railings, Within Home, Primary none  -AE       Row Name 04/14/24 1349          Cognition    Orientation Status (Cognition) oriented x 3  -AE       Row Name 04/14/24 1349          Safety Issues, Functional Mobility    Safety Issues Affecting Function (Mobility) awareness of need for assistance;insight into deficits/self-awareness;safety precaution awareness;safety precautions follow-through/compliance;sequencing abilities  -AE     Impairments Affecting Function (Mobility) balance;endurance/activity tolerance;shortness of breath  -AE               User Key  (r) = Recorded By, (t) = Taken By, (c) = Cosigned By      Initials Name Provider Type    AE Jeff Carbone, PT Physical Therapist                   Mobility       Row Name 04/14/24 1351          Bed Mobility    Bed Mobility supine-sit  -AE     Supine-Sit [place] (Bed Mobility) contact guard;1 person assist  -AE     Assistive Device (Bed Mobility) bed rails;head of bed elevated  -AE     Comment, (Bed Mobility) VCs for hand placement and sequencing.  -AE       Row Name 04/14/24 1351          Transfers    Comment, (Transfers) VCs for hand placement and sequencing. Pt demo mild unsteadiness but no overt LOB.  -AE       Row Name 04/14/24 1351          Sit-Stand Transfer    Sit-Stand [place] (Transfers) contact guard;1 person assist  -AE       Row Name 04/14/24 1351          Gait/Stairs (Locomotion)    [place] Level (Gait) contact guard;1 person assist  -AE     Assistive Device (Gait) other (see comments)  LUE pushing IV pole  -AE     Distance in Feet (Gait) 150  -AE     Deviations/Abnormal Patterns (Gait) bilateral deviations;melissa decreased;gait speed decreased;stride length decreased  -AE     Bilateral Gait Deviations heel strike decreased  -AE     Comment, (Gait/Stairs) Pt demo step through gait pattern with narrow GILBERT and slow gait speed. At baseline patient is SOA with activity. Further distance limited by fatigue.  -AE                User Key  (r) = Recorded By, (t) = Taken By, (c) = Cosigned By      Initials Name Provider Type    AE Jeff Carbone PT Physical Therapist                   Obj/Interventions       Row Name 04/14/24 1356          Range of Motion Comprehensive    General Range of Motion bilateral lower extremity ROM WFL  -AE       Row Name 04/14/24 1356          Strength Comprehensive (MMT)    General Manual Muscle Testing (MMT) Assessment lower extremity strength deficits identified  -AE     Comment, General Manual Muscle Testing (MMT) Assessment BLE grossly 4/5  -AE       Row Name 04/14/24 1356          Balance    Balance Assessment sitting static balance;sitting dynamic balance;sit to stand dynamic balance;standing static balance;standing dynamic balance  -AE     Static Sitting Balance contact guard  -AE     Dynamic Sitting Balance contact guard  -AE     Position, Sitting Balance unsupported;sitting edge of bed  -AE     Sit to Stand Dynamic Balance contact guard;1-person assist;verbal cues  -AE     Static Standing Balance contact guard  -AE     Dynamic Standing Balance contact guard  -AE     Position/Device Used, Standing Balance supported  -AE       Row Name 04/14/24 1356          Sensory Assessment (Somatosensory)    Sensory Assessment (Somatosensory) LE sensation intact  -AE               User Key  (r) = Recorded By, (t) = Taken By, (c) = Cosigned By      Initials Name Provider Type    AE Jeff Carbone, PT Physical Therapist                   Goals/Plan       Row Name 04/14/24 1400          Bed Mobility Goal 1 (PT)    Activity/Assistive Device (Bed Mobility Goal 1, PT) sit to supine/supine to sit  -AE     [place] Level/Cues Needed (Bed Mobility Goal 1, PT) modified independence  -AE     Time Frame (Bed Mobility Goal 1, PT) long term goal (LTG);10 days  -AE     Progress/Outcomes (Bed Mobility Goal 1, PT) new goal  -AE       Row Name 04/14/24 1400          Transfer Goal 1 (PT)    Activity/Assistive Device (Transfer  Goal 1, PT) sit-to-stand/stand-to-sit;bed-to-chair/chair-to-bed  -AE     [place] Level/Cues Needed (Transfer Goal 1, PT) standby assist  -AE     Time Frame (Transfer Goal 1, PT) long term goal (LTG);10 days  -AE     Progress/Outcome (Transfer Goal 1, PT) new goal  -AE       Row Name 04/14/24 1400          Gait Training Goal 1 (PT)    Activity/Assistive Device (Gait Training Goal 1, PT) gait (walking locomotion);assistive device use  -AE     [place] Level (Gait Training Goal 1, PT) independent  -AE     Distance (Gait Training Goal 1, PT) 400ft  -AE     Time Frame (Gait Training Goal 1, PT) long term goal (LTG);10 days  -AE     Progress/Outcome (Gait Training Goal 1, PT) new goal  -AE       Row Name 04/14/24 1400          Stairs Goal 1 (PT)    Activity/Assistive Device (Stairs Goal 1, PT) ascending stairs;descending stairs;step-to-step  -AE     [place] Level/Cues Needed (Stairs Goal 1, PT) standby assist  -AE     Number of Stairs (Stairs Goal 1, PT) 1  -AE     Time Frame (Stairs Goal 1, PT) long term goal (LTG);10 days  -AE     Progress/Outcome (Stairs Goal 1, PT) new goal  -AE       Row Name 04/14/24 1400          Therapy Assessment/Plan (PT)    Planned Therapy Interventions (PT) balance training;bed mobility training;gait training;home exercise program;patient/family education;postural re-education;ROM (range of motion);transfer training;strengthening;stair training  -AE               User Key  (r) = Recorded By, (t) = Taken By, (c) = Cosigned By      Initials Name Provider Type    AE Jeff Carbone, PT Physical Therapist                   Clinical Impression       Row Name 04/14/24 9162          Pain    Additional Documentation Pain Scale: FACES Pre/Post-Treatment (Group)  -AE       Row Name 04/14/24 2770          Pain Scale: FACES Pre/Post-Treatment    Pain: FACES Scale, Pretreatment 2-->hurts little bit  -AE     Posttreatment Pain Rating 2-->hurts little bit  -AE     Pain Location -  Side/Orientation Right  -AE     Pain Location - other (see comments)  ribs  -AE     Pre/Posttreatment Pain Comment tolerated  -AE       Row Name 04/14/24 1351          Plan of Care Review    Plan of Care Reviewed With patient;daughter  -AE     Progress no change  -AE     Outcome Evaluation Pt presents with decreased functional mobility and decreased endurance with activity. Pt ambulated 150ft with CGA and LUE pushing IV pole. Recommend continued skilled IP PT interventions. Recommend D/C home with assist with HHPT when medically appropriate.  -AE       Row Name 04/14/24 1354          Therapy Assessment/Plan (PT)    Rehab Potential (PT) good, to achieve stated therapy goals  -AE     Criteria for Skilled Interventions Met (PT) yes  -AE     Therapy Frequency (PT) daily  -AE       Row Name 04/14/24 135          Vital Signs    Pre Systolic BP Rehab 153  -AE     Pre Treatment Diastolic BP 74  -AE     Pretreatment Heart Rate (beats/min) 73  -AE     Posttreatment Heart Rate (beats/min) 80  -AE     Pre SpO2 (%) 95  -AE     O2 Delivery Pre Treatment room air  -AE     O2 Delivery Intra Treatment room air  -AE     Post SpO2 (%) 98  -AE     O2 Delivery Post Treatment room air  -AE     Pre Patient Position Supine  -AE     Intra Patient Position Standing  -AE     Post Patient Position Sitting  -AE       Row Name 04/14/24 8111          Positioning and Restraints    Pre-Treatment Position in bed  -AE     Post Treatment Position chair  -AE     In Chair notified nsg;reclined;call light within reach;encouraged to call for assist;exit alarm on;with family/caregiver;waffle cushion;legs elevated  -AE               User Key  (r) = Recorded By, (t) = Taken By, (c) = Cosigned By      Initials Name Provider Type    AE Jeff Carbone, PT Physical Therapist                   Outcome Measures       Row Name 04/14/24 0345          How much help from another person do you currently need...    Turning from your back to your side while in flat  bed without using bedrails? 3  -AE     Moving from lying on back to sitting on the side of a flat bed without bedrails? 3  -AE     Moving to and from a bed to a chair (including a wheelchair)? 3  -AE     Standing up from a chair using your arms (e.g., wheelchair, bedside chair)? 3  -AE     Climbing 3-5 steps with a railing? 2  -AE     To walk in hospital room? 3  -AE     AM-PAC 6 Clicks Score (PT) 17  -AE     Highest Level of Mobility Goal 5 --> Static standing  -AE       Row Name 04/14/24 1401          Functional Assessment    Outcome Measure Options AM-PAC 6 Clicks Basic Mobility (PT)  -AE               User Key  (r) = Recorded By, (t) = Taken By, (c) = Cosigned By      Initials Name Provider Type    AE Jeff Carbone, PT Physical Therapist                                 Physical Therapy Education       Title: PT OT SLP Therapies (In Progress)       Topic: Physical Therapy (In Progress)       Point: Mobility training (Done)       Learning Progress Summary             Patient Acceptance, E, VU by AE at 4/14/2024 1320                         Point: Home exercise program (Not Started)       Learner Progress:  Not documented in this visit.              Point: Body mechanics (Done)       Learning Progress Summary             Patient Acceptance, E, VU by AE at 4/14/2024 1320                         Point: Precautions (Done)       Learning Progress Summary             Patient Acceptance, E, VU by AE at 4/14/2024 1320                                         User Key       Initials Effective Dates Name Provider Type Discipline    AE 09/21/21 -  Jeff Carbone PT Physical Therapist PT                  PT Recommendation and Plan  Planned Therapy Interventions (PT): balance training, bed mobility training, gait training, home exercise program, patient/family education, postural re-education, ROM (range of motion), transfer training, strengthening, stair training  Plan of Care Reviewed With: patient, daughter  Progress:  no change  Outcome Evaluation: Pt presents with decreased functional mobility and decreased endurance with activity. Pt ambulated 150ft with CGA and LUE pushing IV pole. Recommend continued skilled IP PT interventions. Recommend D/C home with assist with HHPT when medically appropriate.     Time Calculation:   PT Evaluation Complexity  History, PT Evaluation Complexity: 1-2 personal factors and/or comorbidities  Examination of Body Systems (PT Eval Complexity): total of 3 or more elements  Clinical Presentation (PT Evaluation Complexity): stable  Clinical Decision Making (PT Evaluation Complexity): low complexity  Overall Complexity (PT Evaluation Complexity): low complexity     PT Charges       Row Name 04/14/24 1402             Time Calculation    Start Time 1320  -AE      PT Received On 04/14/24  -AE      PT Goal Re-Cert Due Date 04/24/24  -AE         Untimed Charges    PT Eval/Re-eval Minutes 47  -AE         Total Minutes    Untimed Charges Total Minutes 47  -AE       Total Minutes 47  -AE                User Key  (r) = Recorded By, (t) = Taken By, (c) = Cosigned By      Initials Name Provider Type    AE Jeff Carbone, PT Physical Therapist                  Therapy Charges for Today       Code Description Service Date Service Provider Modifiers Qty    [id number]  PT EVAL LOW COMPLEXITY 4 4/14/2024 Jeff Carbone, PT GP 1            PT G-Codes  Outcome Measure Options: AM-PAC 6 Clicks Basic Mobility (PT)  AM-PAC 6 Clicks Score (PT): 17  PT Discharge Summary  Anticipated Discharge Disposition (PT): home with assist, home with home health    Jeff Carbone PT  4/14/2024

## 2024-04-14 NOTE — PROGRESS NOTES
INFECTIOUS DISEASE Progress note     David Barfield  1950  [id number]        Admission Date: 4/12/2024      Requesting Provider: No ref. provider found  Evaluating Physician: Derian Barry MD    Reason for Consultation: sepsis     History of present illness:    David Barfield is a 74 y.o. male, with PMH NSCLC/RLL lobectomy (1/2024) currently on Opdivo, HTN, emphysema, seen today for sepsis. Last Opdivo was last Thursday,  presented to the ED with complaints of  nausea, vomiting, lower back pain, and diarrhea. He has been afebrile. Admitting labs with WBC 36.28, plt 291, LAC 6.7, PCT 4.88, Scr 2.23, ALT 15, AST 33, negative respiratory panel , and UA with TNTC WBCS. CXR with chronic findings.  CT concerning with bilateral pyelonephritis, circumferential wall thickening of the urinary bladder, no abscess, hydronephrosis. Started on Vancomycin , Ertapenem and we were consulted for evaluation and treatment.      4/14/24:  Tmax of 99.1. Blood cultures remain negative to date. One episode of diarrhea this am.  Still with moderate amount of sputum production with cough.  Complains of right sided rib pain with cough.  Wife at bedside. Am labs pending     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    Diabetes mellitus Borderline    Emphysema/COPD     Erectile disorder     GERD (gastroesophageal reflux disease)     History of chemotherapy     Hyperlipidemia     Hypertension 2019    Lung nodule     Mumps     Mumps     Pruritus     after bath    Slow to wake up after anesthesia     Wears dentures     upper only    Wears hearing aid in both ears     usually only wears right       Past Surgical History:   Procedure Laterality Date    BONE BIOPSY      broken bone surgery in his face    BRONCHOSCOPY THORACOTOMY Right 1/9/2024    Procedure: THORACOTOMY FOR LOWER LOBECTOMY AND MEDISTINAL LYMPH NODE  "DISSECTION RIGHT;  Surgeon: Joey Patel MD;  Location:  [facility];  Service: Cardiothoracic;  Laterality: Right;    BRONCHOSCOPY WITH ION ROBOTIC ASSIST N/A 09/15/2023    Procedure: BRONCHOSCOPY NAVIGATION WITH ENDOBRONCHIAL ULTRASOUND AND ION ROBOT;  Surgeon: Octaviano Sampson MD;  Location:  [facility];  Service: Robotics - Pulmonary;  Laterality: N/A;  ion #6 - 0032  - 0015  Cath guide 0061    EBUS balloon removed and intact    CARDIAC ELECTROPHYSIOLOGY PROCEDURE N/A 08/17/2021    Procedure: Pacemaker DC new;  Surgeon: aKyy Box MD;  Location:  [facility];  Service: Cardiology;  Laterality: N/A;    FACIAL FRACTURE SURGERY      PACEMAKER IMPLANTATION         Family History   Problem Relation Age of Onset    Aneurysm Mother         brain    Dementia Father     Leukemia Sister     Heart disease Paternal Grandmother     Hypertension Paternal Grandfather        Social History     Socioeconomic History    Marital status: Significant Other    Number of children: 3   Tobacco Use    Smoking status: Former     Current packs/day: 0.50     Average packs/day: 0.5 packs/day for 56.3 years (28.6 ttl pk-yrs)     Types: Cigarettes     Start date: 1/1/1968    Smokeless tobacco: Never    Tobacco comments:     Pt states that he quit smoking on 1/8/24. The day before his sx.    Vaping Use    Vaping status: Never Used   Substance and Sexual Activity    Alcohol use: Never    Drug use: Never    Sexual activity: Defer     Partners: Female     Birth control/protection: None       Allergies   Allergen Reactions    Cymbalta [Duloxetine Hcl] GI Intolerance    Gabapentin Mental Status Change     Pt states that this medication \"makes him feel foolish in his head\".     Remeron [Mirtazapine] Other (See Comments)     Excess sedation    Toradol [Ketorolac Tromethamine] GI Intolerance     Projectile vomiting     Latex Other (See Comments)     Latex allergy     Tape Rash         Medication:    Current " Facility-Administered Medications:     acetaminophen (TYLENOL) tablet 650 mg, 650 mg, Oral, Q4H PRN, 650 mg at 04/13/24 2212 **OR** acetaminophen (TYLENOL) 160 MG/5ML oral solution 650 mg, 650 mg, Oral, Q4H PRN **OR** acetaminophen (TYLENOL) suppository 650 mg, 650 mg, Rectal, Q4H PRN, DaylinTatiana coonsie G, DO    budesonide-formoterol (SYMBICORT) 160-4.5 MCG/ACT inhaler 2 puff, 2 puff, Inhalation, BID - RT, 2 puff at 04/14/24 1859 **AND** tiotropium (SPIRIVA RESPIMAT) 2.5 mcg/act aerosol solution inhaler, 2 puff, Inhalation, Daily - RT, DaylinRosalia coon G, DO, 2 puff at 04/14/24 0937    Calcium Replacement - Follow Nurse / BPA Driven Protocol, , Does not apply, PRN, DaylinTatiana coonsie G, DO    cefepime 2000 mg IVPB in 100 mL NS (MBP), 2,000 mg, Intravenous, Daily, Derian Barry MD, 2,000 mg at 04/14/24 0830    heparin (porcine) 5000 UNIT/ML injection 5,000 Units, 5,000 Units, Subcutaneous, Q8H, Gladis Maoe G, DO, 5,000 Units at 04/14/24 1411    HYDROcodone-acetaminophen (NORCO) 7.5-325 MG per tablet 1 tablet, 1 tablet, Oral, Q6H PRN, Gladis Maoe G, DO, 1 tablet at 04/13/24 1827    Magnesium Standard Dose Replacement - Follow Nurse / BPA Driven Protocol, , Does not apply, PRN, Daylin, Rosalia G, DO    nicotine (NICODERM CQ) 21 MG/24HR patch 1 patch, 1 patch, Transdermal, Q24H, Gladis Maoe G, DO, 1 patch at 04/14/24 0558    nicotine polacrilex (NICORETTE) gum 4 mg, 4 mg, Mouth/Throat, Q1H PRN, DaylinTatianaRosalia G, DO    ondansetron (ZOFRAN) injection 4 mg, 4 mg, Intravenous, Q6H PRN, Daylin, Rosalia G, DO    ondansetron ODT (ZOFRAN-ODT) disintegrating tablet 4 mg, 4 mg, Oral, Q6H PRN **OR** ondansetron (ZOFRAN) injection 4 mg, 4 mg, Intravenous, Q6H PRN, Daylin, Rosalia G, DO    [Held by provider] pantoprazole (PROTONIX) EC tablet 40 mg, 40 mg, Oral, Q AM, Daylin, Rosalia G, DO, 40 mg at 04/13/24 0532    pravastatin (PRAVACHOL) tablet 80 mg, 80 mg, Oral, Nightly, Daylin, Rosalia G, DO, 80 mg at 04/13/24 1378    prochlorperazine  (COMPAZINE) tablet 5 mg, 5 mg, Oral, Q6H PRN, Daylin, Rosalia G, DO    sodium chloride 0.9 % flush 10 mL, 10 mL, Intravenous, PRN, Daylin, Rosalia G, DO    sodium chloride 0.9 % flush 10 mL, 10 mL, Intravenous, Q12H, Daylin, Rosalia G, DO, 10 mL at 24 0831    sodium chloride 0.9 % flush 10 mL, 10 mL, Intravenous, PRN, Daylin, Rosalia G, DO    sodium chloride 0.9 % infusion 40 mL, 40 mL, Intravenous, PRN, Daylin, Rosalia G, DO    tamsulosin (FLOMAX) 24 hr capsule 0.4 mg, 0.4 mg, Oral, Daily, Daylin, Rosalia G, DO, 0.4 mg at 24 0830    Antibiotics:  Anti-Infectives (From admission, onward)      Ordered     Dose/Rate Route Frequency Start Stop    24 1025  cefepime 2000 mg IVPB in 100 mL NS (MBP)        Ordering Provider: Derian Barry MD    2,000 mg  over 4 Hours Intravenous Daily 24 0900 24 0859    24 1025  cefepime 2000 mg IVPB in 100 mL NS (MBP)        Ordering Provider: Derian Barry MD    2,000 mg  over 30 Minutes Intravenous Once 24 1115 24 1301    24 1922  vancomycin IVPB 1750 mg in 0.9% Sodium Chloride (premix) 500 mL        Ordering Provider: Michelle Iyer PA-C    22 mg/kg × 78 kg  285.7 mL/hr over 105 Minutes Intravenous Once 24 2335    24 1922  piperacillin-tazobactam (ZOSYN) 3.375 g IVPB in 100 mL NS MBP (CD)        Ordering Provider: Michelle Iyer PA-C    3.375 g  over 30 Minutes Intravenous Once 24 1930 24 2148                Physical Exam:   Vital Signs  Temp (24hrs), Av °F (36.7 °C), Min:97.5 °F (36.4 °C), Max:98.2 °F (36.8 °C)    Temp  Min: 97.5 °F (36.4 °C)  Max: 98.2 °F (36.8 °C)  BP  Min: 105/56  Max: 156/76  Pulse  Min: 70  Max: 84  Resp  Min: 16  Max: 16  SpO2  Min: 90 %  Max: 98 %    GENERAL: Awake and alert, in no acute distress.   HEENT: Normocephalic, atraumatic.  PERRL. No conjunctival injection. No icterus. Oropharynx clear without evidence of thrush or exudate. No evidence of periodontal  disease.    NECK: supple   HEART: RRR; No murmur, rubs, gallops.   LUNGS: Clear to auscultation bilaterally without wheezing, rales, rhonchi. Normal respiratory effort. Nonlabored.   ABDOMEN: Soft, nontender, nondistended. Positive bowel sounds. No rebound or guarding.   EXT:  No cyanosis, clubbing or edema. No cord.  :  Without Yeung catheter.  MSK: No joint effusions or erythema. Left-sided CVA tenderness on exam.  No right-sided CVA tenderness on exam.  SKIN: No generalized rashes noted.  No peripheral stigmata of infective endocarditis noted.   NEURO: Oriented to PPT.  Normal speech and cognition.  PSYCHIATRIC: Normal insight and judgment. Cooperative with PE  Right PPM site without redness, tenderness  Left PAC site without redness     Laboratory Data    Results from last 7 days   Lab Units 04/14/24  0603 04/13/24  0158 04/12/24  1905   WBC 10*3/mm3 22.61* 46.05* 36.28*   HEMOGLOBIN g/dL 8.7* 9.0* 12.4*   HEMATOCRIT % 27.2* 28.9* 40.0   PLATELETS 10*3/mm3 176 185 291     Results from last 7 days   Lab Units 04/14/24  0603   SODIUM mmol/L 133*   POTASSIUM mmol/L 4.4   CHLORIDE mmol/L 101   CO2 mmol/L 20.0*   BUN mg/dL 39*   CREATININE mg/dL 4.58*   GLUCOSE mg/dL 95   CALCIUM mg/dL 7.7*     Results from last 7 days   Lab Units 04/14/24  0603 04/12/24  1905 04/10/24  1355   ALK PHOS U/L 61   < > 95   BILIRUBIN mg/dL <0.2   < > 0.2   BILIRUBIN DIRECT mg/dL  --   --  <0.2   ALT (SGPT) U/L 9   < > 19   AST (SGOT) U/L 15   < > 24    < > = values in this interval not displayed.             Results from last 7 days   Lab Units 04/13/24  2038   LACTATE mmol/L 1.6     Results from last 7 days   Lab Units 04/13/24  1043   CK TOTAL U/L 33         Estimated Creatinine Clearance: 15.4 mL/min (A) (by C-G formula based on SCr of 4.58 mg/dL (H)).      Microbiology:  Blood culture ×2 NGSF     Urine culture: In progress    Respiratory PCR panel: Negative      Radiology:  Imaging Results (Last 72 Hours)       Procedure Component  Value Units Date/Time    CT Abdomen Pelvis Without Contrast [[id number]] Collected: 04/12/24 2010     Updated: 04/12/24 2025    Narrative:      CT ABDOMEN PELVIS WO CONTRAST, CT CHEST WO CONTRAST DIAGNOSTIC    Date of Exam: 4/12/2024 8:00 PM EDT    Indication: Sepsis.    Comparison: 3/28/2024 PET/CT;     Technique: Axial CT images were obtained of the chest, abdomen and pelvis without the administration of contrast. Reconstructed coronal and sagittal images were also obtained. Automated exposure control and iterative construction methods were used.      Findings:  CT chest:    The central airways are patent. Redemonstrated postsurgical changes of the right lung with medial inferior scarring and small adjacent loculated pleural effusion. There is background emphysematous changes with biapical pleural-parenchymal scarring.   Scattered calcified granulomas. No suspicious pulmonary nodule or mass. No focal airspace consolidation.    Right chest wall cardiac device distal lead tips in unchanged position. Left chest wall port with distal lead tip overlying the superior vena cava. The heart is unchanged in size. Coronary artery calcifications. No pericardial effusion. No mediastinal   mass. Prominent mediastinal lymph nodes. Prominent right supraclavicular lymph node.    Chest wall soft tissues show no acute abnormality.        CT abdomen/pelvis:    There is no suspicious hepatic lesion. The gallbladder is unremarkable. No significant biliary ductal dilation.    The spleen, pancreas, and bilateral adrenal glands are unchanged.    As compared to 3/28/2024 PET/CT, there is edematous appearance of the bilateral kidneys with extensive bilateral perinephric fat stranding. No hydronephrosis or nephrolithiasis.    Small hiatal hernia. No evidence of bowel obstruction.    No suspicious abdominal or pelvic lymphadenopathy. No abdominal aortic aneurysm. Atherosclerotic calcifications of the aorta and branch vessels.    There is  circumferential wall thickening of the urinary bladder. The prostate is enlarged with prostatic calcifications.    Abdominal and pelvic wall soft tissues show no acute abnormality. Small bilateral fat-containing inguinal hernias. Redemonstrated right lateral fifth and sixth rib fractures which are healing. There is no evidence of acute fracture or suspicious osseous   lesion.      Impression:      Impression:  Findings concerning for bilateral pyelonephritis. Circumferential wall thickening of the urinary bladder which can be seen with cystitis. No evidence of abscess formation, hydronephrosis, or other complication.    Additional chronic findings as above including surgical changes of the right lung and healing right lateral fifth and sixth rib fractures.    Small hiatal hernia.    Previously seen mildly prominent hypermetabolic mediastinal and right supraclavicular lymph nodes are unchanged.            Electronically Signed: Raulito Light MD    4/12/2024 8:22 PM EDT    Workstation ID: [id number]    CT Chest Without Contrast Diagnostic [[id number]] Collected: 04/12/24 2010     Updated: 04/12/24 2025    Narrative:      CT ABDOMEN PELVIS WO CONTRAST, CT CHEST WO CONTRAST DIAGNOSTIC    Date of Exam: 4/12/2024 8:00 PM EDT    Indication: Sepsis.    Comparison: 3/28/2024 PET/CT;     Technique: Axial CT images were obtained of the chest, abdomen and pelvis without the administration of contrast. Reconstructed coronal and sagittal images were also obtained. Automated exposure control and iterative construction methods were used.      Findings:  CT chest:    The central airways are patent. Redemonstrated postsurgical changes of the right lung with medial inferior scarring and small adjacent loculated pleural effusion. There is background emphysematous changes with biapical pleural-parenchymal scarring.   Scattered calcified granulomas. No suspicious pulmonary nodule or mass. No focal airspace consolidation.    Right chest wall  cardiac device distal lead tips in unchanged position. Left chest wall port with distal lead tip overlying the superior vena cava. The heart is unchanged in size. Coronary artery calcifications. No pericardial effusion. No mediastinal   mass. Prominent mediastinal lymph nodes. Prominent right supraclavicular lymph node.    Chest wall soft tissues show no acute abnormality.        CT abdomen/pelvis:    There is no suspicious hepatic lesion. The gallbladder is unremarkable. No significant biliary ductal dilation.    The spleen, pancreas, and bilateral adrenal glands are unchanged.    As compared to 3/28/2024 PET/CT, there is edematous appearance of the bilateral kidneys with extensive bilateral perinephric fat stranding. No hydronephrosis or nephrolithiasis.    Small hiatal hernia. No evidence of bowel obstruction.    No suspicious abdominal or pelvic lymphadenopathy. No abdominal aortic aneurysm. Atherosclerotic calcifications of the aorta and branch vessels.    There is circumferential wall thickening of the urinary bladder. The prostate is enlarged with prostatic calcifications.    Abdominal and pelvic wall soft tissues show no acute abnormality. Small bilateral fat-containing inguinal hernias. Redemonstrated right lateral fifth and sixth rib fractures which are healing. There is no evidence of acute fracture or suspicious osseous   lesion.      Impression:      Impression:  Findings concerning for bilateral pyelonephritis. Circumferential wall thickening of the urinary bladder which can be seen with cystitis. No evidence of abscess formation, hydronephrosis, or other complication.    Additional chronic findings as above including surgical changes of the right lung and healing right lateral fifth and sixth rib fractures.    Small hiatal hernia.    Previously seen mildly prominent hypermetabolic mediastinal and right supraclavicular lymph nodes are unchanged.            Electronically Signed: Raulito Light MD     4/12/2024 8:22 PM EDT    Workstation ID: [id number]    XR Chest 1 View [[id number]] Collected: 04/12/24 1924     Updated: 04/12/24 1929    Narrative:      XR CHEST 1 VW    Date of Exam: 4/12/2024 6:56 PM EDT    Indication: Weak/Dizzy/AMS triage protocol    Comparison: 2/21/2024 CT    Findings:  Right chest wall cardiac device distal lead tips in unchanged position. Left chest wall port with distal lead tip overlying the superior vena cava. There is a chronic small right-sided pleural effusion. No evidence of left pleural effusion. No new focal   airspace consolidation. No pneumothorax. No acute osseous abnormality.      Impression:      Impression:  No acute cardiopulmonary abnormality. Chronic findings as above.      Electronically Signed: Raulito Light MD    4/12/2024 7:26 PM EDT    Workstation ID: [id number]            Impression:   Sepsis, manifested by leukocytosis, acute kidney injury, hypotension, tachycardia, lactic acidosis -Suspect from urinary source. Could have bacteremia given presentation. Blood cultures remain no growth to date.  Pyuria/UTI/bilateral pyelonephritis per CT- urine cultures pending   Acute kidney injury-ATN in the setting of sepsis/hypotension  Severe leukocytosis with neutrophilia-worsening  NSCLC, s/p lobectomy, now on immunotherapy   Nausea and vomiting-likely secondary to pyelonephritis  Diarrhea, Cdiff pending  Hypophosphatemia  Macrocytic anemia  10. Nonsmall cell lung cancer of the RLL, Stage IIIA     PLAN/RECOMMENDATIONS:   - Cefepime per pharmacy dosing. This will provide coverage for Pseudomonas and other gram-negative rods.  No history of resistant bacteria per the patient's report and he has not had a lot of urinary tract infections in the past.  - Cdiff PCR- Negative  - Follow urine and blood cultures  - Continue to follow CBC and CMP closely    Dr Barry has obtained the history, performed the PE, formulated the above treatment plan      I (Dr. Barry) independently  obtained history, performed a physical exam, and formulated the above assessment and plan.  I reviewed the patient's labs, micro, radiographs, and medications today.  I edited the above note to reflect my findings.  I discussed in length with the patient and his Daughter at bedside today    Copied text in this note has been reviewed and is accurate as of 04/14/24    Complex MDM    Derian Barry MD  4/14/2024  20:22 EDT

## 2024-04-14 NOTE — PROGRESS NOTES
[facility] Medicine Services  PROGRESS NOTE    Patient Name: David Barfield  : 1950  MRN: [id number]    Date of Admission: 2024  Primary Care Physician: Shahid Drake MD    Subjective   Subjective     CC:  Follow-up sepsis    HPI:  225mL UOP. No fever. Urinated x2 in the urinal since admission and once in the toilet - no other UOP. Had a BM this AM.  No fever or chills.  Some nausea.  No dysuria.  Right hand swelling noted this morning.  No family at bedside.    Objective   Objective     Vital Signs:   Temp:  [97.5 °F (36.4 °C)-99.1 °F (37.3 °C)] 97.5 °F (36.4 °C)  Heart Rate:  [70-78] 70  Resp:  [16-18] 16  BP: (104-131)/(54-82) 119/66     Physical Exam:  Constitutional: No acute distress, awake, alert  HENT: NCAT, mucous membranes moist  Respiratory: Clear to auscultation, respiratory effort normal; port site nontender and no erythema  Cardiovascular: RRR, no murmurs, rubs, or gallops  Gastrointestinal: Normoactive bowel sounds, soft, nondistended  Musculoskeletal: No bilateral ankle edema; right hand edema  Psychiatric: Appropriate affect, cooperative  Neurologic: PERRL, symmetric facies, moves all extremities well, speech clear  Skin: No rashes on exposed skin    Results Reviewed:  LAB RESULTS:      Lab 248 24  1611 24  0857 24  0330 24  0158 24  2311 24  1905 04/10/24  1355   WBC  --   --   --   --  46.05*  --  36.28* 13.92*   HEMOGLOBIN  --   --   --   --  9.0*  --  12.4* 11.2*   HEMATOCRIT  --   --   --   --  28.9*  --  40.0 34.5*   PLATELETS  --   --   --   --  185  --  291 396   NEUTROS ABS  --   --   --   --  42.12*  --  34.47* 10.73*   IMMATURE GRANS (ABS)  --   --   --   --  1.50*  --   --  0.02   LYMPHS ABS  --   --   --   --  0.64*  --   --  1.92   MONOS ABS  --   --   --   --  1.67*  --   --  1.04*   EOS ABS  --   --   --   --  0.00  --  0.00 0.18   MCV  --   --   --   --  99.3*  --  97.3* 95.8   PROCALCITONIN   --   --   --   --   --   --  4.88*  --    LACTATE 1.6 2.5* 2.1* 2.2* 2.1*   < > 6.7*  --     < > = values in this interval not displayed.         Lab 04/13/24  2038 04/13/24  1043 04/13/24  0330 04/13/24  0158 04/12/24  1905 04/10/24  1355   SODIUM  --   --  140  --  142 141   POTASSIUM 4.5 5.7* 4.7  --  3.4* 4.0   CHLORIDE  --   --  113*  --  105 104   CO2  --   --  16.0*  --  21.0* 27.0   ANION GAP  --   --  11.0  --  16.0* 10.0   BUN  --   --  25*  --  22 17   CREATININE  --   --  2.73*  --  2.23* 1.55*   EGFR  --   --  23.5*  --  30.0* 46.4*   GLUCOSE  --   --  104*  --  117* 110*   CALCIUM  --   --  7.9*  --  9.2 9.7   MAGNESIUM  --   --   --  1.3* 1.8  1.7  --    PHOSPHORUS  --  2.9  --  1.7*  --   --    TSH  --   --   --  1.150  --   --          Lab 04/13/24  0330 04/12/24  1905 04/10/24  1355   TOTAL PROTEIN 5.5* 7.9 9.0*   ALBUMIN 2.4* 3.6 4.1   GLOBULIN 3.1 4.3  --    ALT (SGPT) 10 15 19   AST (SGOT) 21 33 24   BILIRUBIN 0.2 0.4 0.2   BILIRUBIN DIRECT  --   --  <0.2   ALK PHOS 50 81 95   LIPASE  --  31  --          Lab 04/13/24 0330 04/13/24 0158 04/12/24 1905   HSTROP T 37* 34* 35*                 Brief Urine Lab Results  (Last result in the past 365 days)        Color   Clarity   Blood   Leuk Est   Nitrite   Protein   CREAT   Urine HCG        04/13/24 1632             38.1                 Microbiology Results Abnormal       Procedure Component Value - Date/Time    Blood Culture - Blood, Wrist, Left [[id number]]  (Normal) Collected: 04/12/24 2009    Lab Status: Preliminary result Specimen: Blood from Wrist, Left Updated: 04/13/24 2100     Blood Culture No growth at 24 hours    Blood Culture - Blood, Arm, Right [[id number]]  (Normal) Collected: 04/12/24 2009    Lab Status: Preliminary result Specimen: Blood from Arm, Right Updated: 04/13/24 2100     Blood Culture No growth at 24 hours    Eosinophil Smear - Urine, Urine, Clean Catch [[id number]]  (Normal) Collected: 04/13/24 1631    Lab Status: Final  result Specimen: Urine, Clean Catch Updated: 04/13/24 1732     Eosinophil Smear 0 % EOS/100 Cells     Narrative:      No eosinophil seen    MRSA Screen, PCR (Inpatient) - Swab, Nares [[id number]]  (Normal) Collected: 04/12/24 2128    Lab Status: Final result Specimen: Swab from Nares Updated: 04/13/24 0803     MRSA PCR Negative    Narrative:      The negative predictive value of this diagnostic test is high and should only be used to consider de-escalating anti-MRSA therapy. A positive result may indicate colonization with MRSA and must be correlated clinically.  MRSA Negative    COVID PRE-OP / PRE-PROCEDURE SCREENING ORDER (NO ISOLATION) - Swab, Nasopharynx [[id number]]  (Normal) Collected: 04/12/24 2010    Lab Status: Final result Specimen: Swab from Nasopharynx Updated: 04/12/24 2121    Narrative:      The following orders were created for panel order COVID PRE-OP / PRE-PROCEDURE SCREENING ORDER (NO ISOLATION) - Swab, Nasopharynx.  Procedure                               Abnormality         Status                     ---------                               -----------         ------                     Respiratory Panel PCR w/...[[id number]]  Normal              Final result                 Please view results for these tests on the individual orders.    Respiratory Panel PCR w/COVID-19(SARS-CoV-2) [facility] In-House, NP Swab in UTM/VTM, 2 HR TAT - Swab, Nasopharynx [[id number]]  (Normal) Collected: 04/12/24 2010    Lab Status: Final result Specimen: Swab from Nasopharynx Updated: 04/12/24 2121     ADENOVIRUS, PCR Not Detected     Coronavirus 229E Not Detected     Coronavirus HKU1 Not Detected     Coronavirus NL63 Not Detected     Coronavirus OC43 Not Detected     COVID19 Not Detected     Human Metapneumovirus Not Detected     Human Rhinovirus/Enterovirus Not Detected     Influenza A PCR Not Detected     Influenza B PCR Not Detected     Parainfluenza Virus 1 Not Detected     Parainfluenza Virus 2 Not  Detected     Parainfluenza Virus 3 Not Detected     Parainfluenza Virus 4 Not Detected     RSV, PCR Not Detected     Bordetella pertussis pcr Not Detected     Bordetella parapertussis PCR Not Detected     Chlamydophila pneumoniae PCR Not Detected     Mycoplasma pneumo by PCR Not Detected    Narrative:      In the setting of a positive respiratory panel with a viral infection PLUS a negative procalcitonin without other underlying concern for bacterial infection, consider observing off antibiotics or discontinuation of antibiotics and continue supportive care. If the respiratory panel is positive for atypical bacterial infection (Bordetella pertussis, Chlamydophila pneumoniae, or Mycoplasma pneumoniae), consider antibiotic de-escalation to target atypical bacterial infection.            CT Chest Without Contrast Diagnostic    Result Date: 4/12/2024  CT ABDOMEN PELVIS WO CONTRAST, CT CHEST WO CONTRAST DIAGNOSTIC Date of Exam: 4/12/2024 8:00 PM EDT Indication: Sepsis. Comparison: 3/28/2024 PET/CT; Technique: Axial CT images were obtained of the chest, abdomen and pelvis without the administration of contrast. Reconstructed coronal and sagittal images were also obtained. Automated exposure control and iterative construction methods were used. Findings: CT chest: The central airways are patent. Redemonstrated postsurgical changes of the right lung with medial inferior scarring and small adjacent loculated pleural effusion. There is background emphysematous changes with biapical pleural-parenchymal scarring. Scattered calcified granulomas. No suspicious pulmonary nodule or mass. No focal airspace consolidation. Right chest wall cardiac device distal lead tips in unchanged position. Left chest wall port with distal lead tip overlying the superior vena cava. The heart is unchanged in size. Coronary artery calcifications. No pericardial effusion. No mediastinal mass. Prominent mediastinal lymph nodes. Prominent right  supraclavicular lymph node. Chest wall soft tissues show no acute abnormality. CT abdomen/pelvis: There is no suspicious hepatic lesion. The gallbladder is unremarkable. No significant biliary ductal dilation. The spleen, pancreas, and bilateral adrenal glands are unchanged. As compared to 3/28/2024 PET/CT, there is edematous appearance of the bilateral kidneys with extensive bilateral perinephric fat stranding. No hydronephrosis or nephrolithiasis. Small hiatal hernia. No evidence of bowel obstruction. No suspicious abdominal or pelvic lymphadenopathy. No abdominal aortic aneurysm. Atherosclerotic calcifications of the aorta and branch vessels. There is circumferential wall thickening of the urinary bladder. The prostate is enlarged with prostatic calcifications. Abdominal and pelvic wall soft tissues show no acute abnormality. Small bilateral fat-containing inguinal hernias. Redemonstrated right lateral fifth and sixth rib fractures which are healing. There is no evidence of acute fracture or suspicious osseous lesion.     Impression: Impression: Findings concerning for bilateral pyelonephritis. Circumferential wall thickening of the urinary bladder which can be seen with cystitis. No evidence of abscess formation, hydronephrosis, or other complication. Additional chronic findings as above including surgical changes of the right lung and healing right lateral fifth and sixth rib fractures. Small hiatal hernia. Previously seen mildly prominent hypermetabolic mediastinal and right supraclavicular lymph nodes are unchanged. Electronically Signed: Raulito Light MD  4/12/2024 8:22 PM EDT  Workstation ID: [id number]    CT Abdomen Pelvis Without Contrast    Result Date: 4/12/2024  CT ABDOMEN PELVIS WO CONTRAST, CT CHEST WO CONTRAST DIAGNOSTIC Date of Exam: 4/12/2024 8:00 PM EDT Indication: Sepsis. Comparison: 3/28/2024 PET/CT; Technique: Axial CT images were obtained of the chest, abdomen and pelvis without the  administration of contrast. Reconstructed coronal and sagittal images were also obtained. Automated exposure control and iterative construction methods were used. Findings: CT chest: The central airways are patent. Redemonstrated postsurgical changes of the right lung with medial inferior scarring and small adjacent loculated pleural effusion. There is background emphysematous changes with biapical pleural-parenchymal scarring. Scattered calcified granulomas. No suspicious pulmonary nodule or mass. No focal airspace consolidation. Right chest wall cardiac device distal lead tips in unchanged position. Left chest wall port with distal lead tip overlying the superior vena cava. The heart is unchanged in size. Coronary artery calcifications. No pericardial effusion. No mediastinal mass. Prominent mediastinal lymph nodes. Prominent right supraclavicular lymph node. Chest wall soft tissues show no acute abnormality. CT abdomen/pelvis: There is no suspicious hepatic lesion. The gallbladder is unremarkable. No significant biliary ductal dilation. The spleen, pancreas, and bilateral adrenal glands are unchanged. As compared to 3/28/2024 PET/CT, there is edematous appearance of the bilateral kidneys with extensive bilateral perinephric fat stranding. No hydronephrosis or nephrolithiasis. Small hiatal hernia. No evidence of bowel obstruction. No suspicious abdominal or pelvic lymphadenopathy. No abdominal aortic aneurysm. Atherosclerotic calcifications of the aorta and branch vessels. There is circumferential wall thickening of the urinary bladder. The prostate is enlarged with prostatic calcifications. Abdominal and pelvic wall soft tissues show no acute abnormality. Small bilateral fat-containing inguinal hernias. Redemonstrated right lateral fifth and sixth rib fractures which are healing. There is no evidence of acute fracture or suspicious osseous lesion.     Impression: Impression: Findings concerning for bilateral  pyelonephritis. Circumferential wall thickening of the urinary bladder which can be seen with cystitis. No evidence of abscess formation, hydronephrosis, or other complication. Additional chronic findings as above including surgical changes of the right lung and healing right lateral fifth and sixth rib fractures. Small hiatal hernia. Previously seen mildly prominent hypermetabolic mediastinal and right supraclavicular lymph nodes are unchanged. Electronically Signed: Raulito Light MD  4/12/2024 8:22 PM EDT  Workstation ID: [id number]    XR Chest 1 View    Result Date: 4/12/2024  XR CHEST 1 VW Date of Exam: 4/12/2024 6:56 PM EDT Indication: Weak/Dizzy/AMS triage protocol Comparison: 2/21/2024 CT Findings: Right chest wall cardiac device distal lead tips in unchanged position. Left chest wall port with distal lead tip overlying the superior vena cava. There is a chronic small right-sided pleural effusion. No evidence of left pleural effusion. No new focal airspace consolidation. No pneumothorax. No acute osseous abnormality.     Impression: Impression: No acute cardiopulmonary abnormality. Chronic findings as above. Electronically Signed: Raulito Light MD  4/12/2024 7:26 PM EDT  Workstation ID: [id number]     Results for orders placed during the hospital encounter of 07/09/21    Adult Transthoracic Echo Complete W/ Cont if Necessary Per Protocol    Interpretation Summary  · Estimated left ventricular EF = 55% Left ventricular systolic function is normal.  · Left ventricular diastolic function was normal.  · Left ventricular wall thickness is consistent with mild concentric hypertrophy.  · Trace mitral and tricuspid regurgitation.      Current medications:  Scheduled Meds:budesonide-formoterol, 2 puff, Inhalation, BID - RT   And  tiotropium bromide monohydrate, 2 puff, Inhalation, Daily - RT  cefepime, 2,000 mg, Intravenous, Daily  heparin (porcine), 5,000 Units, Subcutaneous, Q8H  nicotine, 1 patch, Transdermal,  Q24H  [Held by provider] pantoprazole, 40 mg, Oral, Q AM  pravastatin, 80 mg, Oral, Nightly  sodium chloride, 10 mL, Intravenous, Q12H  tamsulosin, 0.4 mg, Oral, Daily      Continuous Infusions:Pharmacy Consult,   sodium bicarbonate 8.4 % 150 mEq in dextrose (D5W) 5 % 1,000 mL infusion (greater than 100 mEq), 150 mEq, Last Rate: 150 mEq (04/14/24 0019)      PRN Meds:.  acetaminophen **OR** acetaminophen **OR** acetaminophen    Calcium Replacement - Follow Nurse / BPA Driven Protocol    HYDROcodone-acetaminophen    Magnesium Standard Dose Replacement - Follow Nurse / BPA Driven Protocol    nicotine polacrilex    ondansetron    ondansetron ODT **OR** ondansetron    Pharmacy Consult    prochlorperazine    sodium chloride    sodium chloride    sodium chloride    Assessment & Plan   Assessment & Plan     Active Hospital Problems    Diagnosis  POA    **Sepsis [A41.9]  Yes    ARACELI (acute kidney injury) [N17.9]  Unknown    Hypokalemia [E87.6]  Unknown    Acute pyelonephritis [N10]  Unknown    Primary hypertension [I10]  Yes    Centrilobular emphysema [J43.2]  Yes    Tobacco abuse [Z72.0]  Yes    Presence of cardiac pacemaker [Z95.0]  Yes    Mixed hyperlipidemia [E78.2]  Yes      Resolved Hospital Problems   No resolved problems to display.        Brief Hospital Course to date:  David Barfield is a 74 y.o. male with a PMH significant for non-small cell lung cancer s/p neoadjuvant chemoimmunotherapy and RLL lobectomy (1/2024) currently on Opdivo (last tx 4/4/24), tobacco use disorder, HTN, emphysema who resented to the ER due to nausea, vomiting, diarrhea.  Found to have severe sepsis secondary to acute pyelonephritis in immunosuppressed patient.    This patient's problems and plans were partially entered by my partner and updated as appropriate by me 04/14/24.     Severe sepsis  Acute pyelonephritis  Immunosuppressed host  Lactic acidosis secondary to above  - Tachycardic, elevated lactic acid, elevated procalcitonin,  leukocytosis, source of infection, ARACELI, hypotension  - S/p IVF  - Continue cefepime  - C. difficile and GI panel negative  - Lactic acid normalized with IVF  - ID following     Olguric ARACELI, POA  --Baseline creatinine 0.94-1.1; creatinine was 2 on 4/4 and 1.55 on 4/10; Cr worsening on labs on 4/13 and up to 4.58 on 4/14; could be related to sepsis/infection  --Acute urinary retention protocol  --Hold nephrotoxic medications  --A.m. labs  --Renal consulted  --Discussed with Dr. Clarke, who recommended Lasix 80 mg IV x 1 and stopping bicarb drip; patient may require dialysis, this was discussed with patient     Non-small cell lung cancer  - Follows with Dr. Gely Ashley  - Last treatment with Opdivo 4/4/2024    Chronic anemia  - Hemoglobin 9 on admission.  The patient's significant other repeatedly questioned me on 4/13 on why the patient was not getting a transfusion of red blood cells, explained to her several times that based on his current hemoglobin, that is not medically necessary at this time and will continue to monitor.     Hypomagnesemia - resolved  - Replace per protocol    Hyperkalemia - resolved  --S/p lokelma      Hypertension  - Hold home lisinopril due to hypotension and ARACELI    Expected Discharge Location and Transportation: D  Expected Discharge   Expected Discharge Date: 4/17/2024; Expected Discharge Time:      DVT prophylaxis:  Medical DVT prophylaxis orders are present.         AM-PAC 6 Clicks Score (PT): 22 (04/13/24 2045)    CODE STATUS:   Code Status and Medical Interventions:   Ordered at: 04/13/24 0017     Level Of Support Discussed With:    Patient     Code Status (Patient has no pulse and is not breathing):    CPR (Attempt to Resuscitate)     Medical Interventions (Patient has pulse or is breathing):    Full Support       Amy Hernandez MD  04/14/24

## 2024-04-15 PROBLEM — E43 SEVERE MALNUTRITION: Status: ACTIVE | Noted: 2024-04-15

## 2024-04-15 LAB
ANION GAP SERPL CALCULATED.3IONS-SCNC: 11 MMOL/L (ref 5–15)
BASOPHILS # BLD AUTO: 0.02 10*3/MM3 (ref 0–0.2)
BASOPHILS NFR BLD AUTO: 0.1 % (ref 0–1.5)
BUN SERPL-MCNC: 49 MG/DL (ref 8–23)
BUN/CREAT SERPL: 8 (ref 7–25)
CALCIUM SPEC-SCNC: 7.7 MG/DL (ref 8.6–10.5)
CHLORIDE SERPL-SCNC: 104 MMOL/L (ref 98–107)
CO2 SERPL-SCNC: 21 MMOL/L (ref 22–29)
CREAT SERPL-MCNC: 6.14 MG/DL (ref 0.76–1.27)
DEPRECATED RDW RBC AUTO: 51.1 FL (ref 37–54)
EGFRCR SERPLBLD CKD-EPI 2021: 8.9 ML/MIN/1.73
EOSINOPHIL # BLD AUTO: 0.16 10*3/MM3 (ref 0–0.4)
EOSINOPHIL NFR BLD AUTO: 1 % (ref 0.3–6.2)
ERYTHROCYTE [DISTWIDTH] IN BLOOD BY AUTOMATED COUNT: 15.2 % (ref 12.3–15.4)
GLUCOSE SERPL-MCNC: 102 MG/DL (ref 65–99)
HCT VFR BLD AUTO: 24.3 % (ref 37.5–51)
HCT VFR BLD AUTO: 25.1 % (ref 37.5–51)
HCT VFR BLD AUTO: 29.2 % (ref 37.5–51)
HGB BLD-MCNC: 8.1 G/DL (ref 13–17.7)
HGB BLD-MCNC: 8.4 G/DL (ref 13–17.7)
HGB BLD-MCNC: 9.2 G/DL (ref 13–17.7)
IMM GRANULOCYTES # BLD AUTO: 0.11 10*3/MM3 (ref 0–0.05)
IMM GRANULOCYTES NFR BLD AUTO: 0.7 % (ref 0–0.5)
LYMPHOCYTES # BLD AUTO: 1 10*3/MM3 (ref 0.7–3.1)
LYMPHOCYTES NFR BLD AUTO: 6 % (ref 19.6–45.3)
MAGNESIUM SERPL-MCNC: 2.5 MG/DL (ref 1.6–2.4)
MCH RBC QN AUTO: 30.7 PG (ref 26.6–33)
MCHC RBC AUTO-ENTMCNC: 33.3 G/DL (ref 31.5–35.7)
MCV RBC AUTO: 92 FL (ref 79–97)
MONOCYTES # BLD AUTO: 1.2 10*3/MM3 (ref 0.1–0.9)
MONOCYTES NFR BLD AUTO: 7.2 % (ref 5–12)
NEUTROPHILS NFR BLD AUTO: 14.09 10*3/MM3 (ref 1.7–7)
NEUTROPHILS NFR BLD AUTO: 85 % (ref 42.7–76)
NRBC BLD AUTO-RTO: 0 /100 WBC (ref 0–0.2)
PHOSPHATE SERPL-MCNC: 3.4 MG/DL (ref 2.5–4.5)
PLATELET # BLD AUTO: 171 10*3/MM3 (ref 140–450)
PMV BLD AUTO: 8.8 FL (ref 6–12)
POTASSIUM SERPL-SCNC: 4.5 MMOL/L (ref 3.5–5.2)
RBC # BLD AUTO: 2.64 10*6/MM3 (ref 4.14–5.8)
SODIUM SERPL-SCNC: 136 MMOL/L (ref 136–145)
WBC NRBC COR # BLD AUTO: 16.58 10*3/MM3 (ref 3.4–10.8)

## 2024-04-15 PROCEDURE — 83735 ASSAY OF MAGNESIUM: CPT | Performed by: STUDENT IN AN ORGANIZED HEALTH CARE EDUCATION/TRAINING PROGRAM

## 2024-04-15 PROCEDURE — 94664 DEMO&/EVAL PT USE INHALER: CPT

## 2024-04-15 PROCEDURE — 94799 UNLISTED PULMONARY SVC/PX: CPT

## 2024-04-15 PROCEDURE — 85014 HEMATOCRIT: CPT | Performed by: STUDENT IN AN ORGANIZED HEALTH CARE EDUCATION/TRAINING PROGRAM

## 2024-04-15 PROCEDURE — 25010000002 CEFEPIME PER 500 MG: Performed by: INTERNAL MEDICINE

## 2024-04-15 PROCEDURE — 99232 SBSQ HOSP IP/OBS MODERATE 35: CPT | Performed by: STUDENT IN AN ORGANIZED HEALTH CARE EDUCATION/TRAINING PROGRAM

## 2024-04-15 PROCEDURE — 97110 THERAPEUTIC EXERCISES: CPT

## 2024-04-15 PROCEDURE — 84100 ASSAY OF PHOSPHORUS: CPT | Performed by: STUDENT IN AN ORGANIZED HEALTH CARE EDUCATION/TRAINING PROGRAM

## 2024-04-15 PROCEDURE — 97116 GAIT TRAINING THERAPY: CPT

## 2024-04-15 PROCEDURE — 94761 N-INVAS EAR/PLS OXIMETRY MLT: CPT

## 2024-04-15 PROCEDURE — 85025 COMPLETE CBC W/AUTO DIFF WBC: CPT | Performed by: STUDENT IN AN ORGANIZED HEALTH CARE EDUCATION/TRAINING PROGRAM

## 2024-04-15 PROCEDURE — 80048 BASIC METABOLIC PNL TOTAL CA: CPT | Performed by: STUDENT IN AN ORGANIZED HEALTH CARE EDUCATION/TRAINING PROGRAM

## 2024-04-15 PROCEDURE — 25010000002 HEPARIN (PORCINE) PER 1000 UNITS: Performed by: INTERNAL MEDICINE

## 2024-04-15 PROCEDURE — 85018 HEMOGLOBIN: CPT | Performed by: STUDENT IN AN ORGANIZED HEALTH CARE EDUCATION/TRAINING PROGRAM

## 2024-04-15 RX ORDER — PANTOPRAZOLE SODIUM 40 MG/10ML
40 INJECTION, POWDER, LYOPHILIZED, FOR SOLUTION INTRAVENOUS EVERY 12 HOURS SCHEDULED
Status: DISCONTINUED | OUTPATIENT
Start: 2024-04-15 | End: 2024-04-22

## 2024-04-15 RX ADMIN — BUDESONIDE AND FORMOTEROL FUMARATE DIHYDRATE 2 PUFF: 160; 4.5 AEROSOL RESPIRATORY (INHALATION) at 20:24

## 2024-04-15 RX ADMIN — HEPARIN SODIUM 5000 UNITS: 5000 INJECTION INTRAVENOUS; SUBCUTANEOUS at 21:26

## 2024-04-15 RX ADMIN — BUDESONIDE AND FORMOTEROL FUMARATE DIHYDRATE 2 PUFF: 160; 4.5 AEROSOL RESPIRATORY (INHALATION) at 09:05

## 2024-04-15 RX ADMIN — PANTOPRAZOLE SODIUM 40 MG: 40 INJECTION, POWDER, FOR SOLUTION INTRAVENOUS at 21:26

## 2024-04-15 RX ADMIN — HEPARIN SODIUM 5000 UNITS: 5000 INJECTION INTRAVENOUS; SUBCUTANEOUS at 06:11

## 2024-04-15 RX ADMIN — TAMSULOSIN HYDROCHLORIDE 0.4 MG: 0.4 CAPSULE ORAL at 08:38

## 2024-04-15 RX ADMIN — TIOTROPIUM BROMIDE INHALATION SPRAY 2 PUFF: 3.12 SPRAY, METERED RESPIRATORY (INHALATION) at 09:05

## 2024-04-15 RX ADMIN — PRAVASTATIN SODIUM 80 MG: 40 TABLET ORAL at 21:26

## 2024-04-15 RX ADMIN — Medication 10 ML: at 08:38

## 2024-04-15 RX ADMIN — Medication 1 PATCH: at 06:12

## 2024-04-15 RX ADMIN — HEPARIN SODIUM 5000 UNITS: 5000 INJECTION INTRAVENOUS; SUBCUTANEOUS at 14:04

## 2024-04-15 RX ADMIN — CEFEPIME 2000 MG: 2 INJECTION, POWDER, FOR SOLUTION INTRAVENOUS at 08:37

## 2024-04-15 RX ADMIN — Medication 10 ML: at 21:27

## 2024-04-15 RX ADMIN — PANTOPRAZOLE SODIUM 40 MG: 40 INJECTION, POWDER, FOR SOLUTION INTRAVENOUS at 08:38

## 2024-04-15 NOTE — PROGRESS NOTES
"   LOS: 3 days    Patient Care Team:  Shahid Drake MD as PCP - General (Family Medicine)  Octaviano Sampson MD as Consulting Physician (Pulmonary Disease)  Neetu Ashley MD as Referring Physician (Hematology and Oncology)  Nimo Rodríguez MD as Consulting Physician (Radiation Oncology)    Subjective     No new events    Objective     Vital Signs:  Blood pressure 160/83, pulse 71, temperature 97.9 °F (36.6 °C), temperature source Oral, resp. rate 18, height 182.9 cm (72\"), weight 78 kg (172 lb), SpO2 90%.      Intake/Output Summary (Last 24 hours) at 4/15/2024 0845  Last data filed at 4/15/2024 0648  Gross per 24 hour   Intake 600 ml   Output 970 ml   Net -370 ml        04/14 0701 - 04/15 0700  In: 600 [P.O.:600]  Out: 970 [Urine:970]    Physical Exam:        GENERAL: WD WM NAD  NEURO: Awake and alert, oriented. No focal deficit  PSYCHIATRIC: NMA. Cooperative with PE  EYE: PE, no icterus, no conjunctivitis  ENT: ommm, dentition intact,  Hearing intact  NECK: Supple , No JVD discernable,  Trachea midline  CV: No edema, RRR  LUNGS:  Quiet,  Nonlabored resp.  Symmetrical expansion  ABDOMEN: Nondistended, soft nontender.  : No Yeung, no palp bladder  SKIN: Warm and dry without rash      Labs:  Results from last 7 days   Lab Units 04/15/24  0416 04/14/24  0603 04/13/24  0158   WBC 10*3/mm3 16.58* 22.61* 46.05*   HEMOGLOBIN g/dL 8.1* 8.7* 9.0*   PLATELETS 10*3/mm3 171 176 185     Results from last 7 days   Lab Units 04/15/24  0416 04/14/24  0603 04/13/24  2038 04/13/24  1043 04/13/24  0330 04/13/24  0158 04/12/24  1905 04/10/24  1355   SODIUM mmol/L 136 133*  --   --  140  --  142 141   POTASSIUM mmol/L 4.5 4.4 4.5 5.7* 4.7  --  3.4* 4.0   CHLORIDE mmol/L 104 101  --   --  113*  --  105 104   CO2 mmol/L 21.0* 20.0*  --   --  16.0*  --  21.0* 27.0   BUN mg/dL 49* 39*  --   --  25*  --  22 17   CREATININE mg/dL 6.14* 4.58*  --   --  2.73*  --  2.23* 1.55*   CALCIUM mg/dL 7.7* 7.7*  --   --  7.9*  --  9.2 9.7 "   PHOSPHORUS mg/dL 3.4 2.8  --  2.9  --  1.7*  --   --    MAGNESIUM mg/dL 2.5* 2.6*  --   --   --  1.3* 1.8  1.7  --    ALBUMIN g/dL  --  2.5*  --   --  2.4*  --  3.6 4.1     Results from last 7 days   Lab Units 04/14/24  0603 04/12/24  1905 04/10/24  1355   ALK PHOS U/L 61   < > 95   BILIRUBIN mg/dL <0.2   < > 0.2   BILIRUBIN DIRECT mg/dL  --   --  <0.2   ALT (SGPT) U/L 9   < > 19   AST (SGOT) U/L 15   < > 24    < > = values in this interval not displayed.                   Estimated Creatinine Clearance: 11.5 mL/min (A) (by C-G formula based on SCr of 6.14 mg/dL (H)).         A/P:    ARF: Creatinine up further at 6.1.  BUN stable at 49.  Urine output now nonoliguric with 970 recorded overnight..  Baseline cr ~ 0.8mg/dl. Cr on this admission 2.2-2.7mg/dl. UA large blood, trace protein, large aspen esterase many wbc++. CT showed perinephric fat stranding consistent with pyelonephritis. Urine na 74 urine cl 16.  Patient likely with tubulointerstitial inflammation in the setting of infection with component of ATN due to hemodynamic instability on ACEI I with nausea, vomiting and poor po intake.. 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   Urine output improving suggesting renal recovery.  No emergent indication for dialysis at this time.  Will monitor for now.  Strict I's and O's.  Monitor renal function closely.  Evaluate daily in a.m. for HD needs.    HTN: Patient initially septic with pyelonephritis.  Improved with volume resuscitation on IVF.  Continues off all blood pressure medications..  RAAS suppression on hold with ARF.  Cover with as needed medications for now.    Hyponatremia: Improving overnight.  Monitor for now.  All fluids isotonic.    Hyperkalemia: Resolved.  Potassium initially up to 5.7 improved to normal range post Lokelma.  RAAS suppression on hold..    Metabolic acidosis: Bicarb initially quite low at 16 with an underlying lactic acidemia due to poor perfusion.  Bicarb  improved with perfusion support.  Would monitor for now.    Anemia: Hemoglobin below goal.  Transfuse as indicated for Hgb <7.  Patient with possible GI bleed.  Recent black stools.  Workup underway.    Volume: Urine output increasing.  For now continue strict I's and O's.    Non-small cell lung cancer:Last treatment with Opdivo 4/4/2024.  Follows with Dr. Gely Ashley.    High risk and complexity patient.    Kam Gomez MD  04/15/24  08:45 EDT

## 2024-04-15 NOTE — CONSULTS
"                  Clinical Nutrition   Nutrition Support Assessment  Reason for Visit: Identified at risk by screening criteria, MST score 2+, Malnutrition Severity Assessment    Patient Name: David Barfield  YOB: 1950  MRN: [id number]  Date of Encounter: 04/14/24 22:50 EDT  Admission date: 4/12/2024    Comments: Pt meets criteria for severe chronic malnutrition based on Wt Intake hx w wasting. See MSA note.    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    PSH:  He  has a past surgical history that includes Bone biopsy; Facial fracture surgery; Cardiac electrophysiology procedure (N/A, 08/17/2021); BRONCHOSCOPY WITH ION ROBOTIC ASSIST (N/A, 09/15/2023); Pacemaker Implantation; and bronchoscopy thoracotomy (Right, 1/9/2024).    Applicable Nutrition Concerns:   Skin:  Oral:  GI:    Applicable Interval History:       Reported/Observed/Food/Nutrition Related History:     4/14  Pt/family confirm current wt. Pt allows < 1/2 usual intake > 1 mo.w effect tx.     Anthropometrics     Height: Height: 182.9 cm (72\")  Last Filed Weight: Weight: 78 kg (172 lb) (04/12/24 9314)  Method: Weight Method: Stated confirmed  BMI: BMI (Calculated): 23.3    UBW:  Per EMR wt of 192 lbs on 11/30/23  Weight change:  Loss of 20 lbs 10% body wt over 4 1/2 mo. "     Nutrition Focused Physical Exam     Date:   4/14      Patient meets criteria for malnutrition diagnosis, see MSA note.    Current Nutrition Prescription   PO: Diet: Gastrointestinal, Renal; Low Potassium; Fiber-Restricted, Low Irritant; Texture: Soft to Chew (NDD 3); Soft to Chew: Whole Meat; Fluid Consistency: Thin (IDDSI 0)  NPO Diet NPO Type: Strict NPO  Oral Nutrition Supplement:   Intake: Insufficient data 25% x 1 meal recorded       Nutrition Diagnosis   Date:  4/14            Updated:    Problem Malnutrition severe chronic   Etiology Effect dx/tx    Signs/Symptoms Wt Intake hx w wasting   Status:     Goal:   Nutrition to support treatment and Establish PO      Nutrition Intervention      Follow treatment progress, Care plan reviewed, Advise alternate selection, Menu provided, Encourage intake      Monitoring/Evaluation:   Per protocol, I&O, PO intake, Pertinent labs, Weight, Symptoms      Ellie Dominguez RD  Time Spent: 30 min

## 2024-04-15 NOTE — CASE MANAGEMENT/SOCIAL WORK
Discharge Planning Assessment  [facility]     Patient Name: David Barfield  MRN: [id number]  Today's Date: 4/15/2024    Admit Date: 4/12/2024    Plan: Home   Discharge Needs Assessment       Row Name 04/15/24 1056       Living Environment    People in Home significant other    Current Living Arrangements home    Potentially Unsafe Housing Conditions none    Primary Care Provided by self    Provides Primary Care For no one    Quality of Family Relationships supportive    Able to Return to Prior Arrangements yes       Resource/Environmental Concerns    Resource/Environmental Concerns none    Transportation Concerns none       Transition Planning    Patient/Family Anticipates Transition to home with family    Patient/Family Anticipated Services at Transition none    Transportation Anticipated family or friend will provide       Discharge Needs Assessment    Readmission Within the Last 30 Days no previous admission in last 30 days    Equipment Currently Used at Home none    Concerns to be Addressed no discharge needs identified    Anticipated Changes Related to Illness none    Equipment Needed After Discharge none    Provided Post Acute Provider List? N/A    N/A Provider List Comment Does not anticipate any discharge needs at this time    Provided Post Acute Provider Quality & Resource List? N/A                   Discharge Plan       Row Name 04/15/24 1059       Plan    Plan Home    Patient/Family in Agreement with Plan yes    Provided Post Acute Provider List? N/A    Provided Post Acute Provider Quality & Resource List? N/A    Plan Comments MSW spoke with pt and pt's significant other at bedside. Pt is independent in ADL's and IADL's. Pt reports he does not feel like he needs any medical equipment at discharge. Therapy had recommended Home health and pt declines this and reports he doesn't feel like he needs any services. Pt and pt's significant other report that they are both independent and if needed they have  family that can assist or they can hire someone to help as needed. Pt's PCP is Shahid Drake. Pt has prescription coverage with his insurance. Pt had no current discharge needs or concerns at this time and plans to discharge home with family to transport.    Final Discharge Disposition Code 01 - home or self-care                  Continued Care and Services - Admitted Since 4/12/2024    No active coordination exists for this encounter.       Expected Discharge Date and Time       Expected Discharge Date Expected Discharge Time    Apr 17, 2024            Demographic Summary       Row Name 04/15/24 1054       General Information    Reason for Consult discharge planning                   Functional Status       Row Name 04/15/24 1055       Functional Status, IADL    Medications independent    Meal Preparation independent    Housekeeping independent    Laundry independent    Shopping independent                   Psychosocial    No documentation.                  Abuse/Neglect    No documentation.                  Legal    No documentation.                  Substance Abuse    No documentation.                  Patient Forms    No documentation.                     TOLU Colon

## 2024-04-15 NOTE — PLAN OF CARE
Goal Outcome Evaluation:  Plan of Care Reviewed With: patient, significant other        Progress: improving  Outcome Evaluation: Pt with good effort and increased ambulation distance to 300' with CGA and no AD. Pt continues to present below his functional baseline with weakness, mild balance deficits, and decreased endurance. Further IPPT is warrented. PT will progress as able per POC.      Anticipated Discharge Disposition (PT): home with assist, home with home health

## 2024-04-15 NOTE — PROGRESS NOTES
Malnutrition Severity Assessment    Patient Name:  David Barfield  YOB: 1950  MRN: [id number]  Admit Date:  4/12/2024    Patient meets criteria for : Severe Malnutrition (Pt meets criteria for severe chronic malnutrition based on Wt Intake hx w wasting.)    Comments:      Malnutrition Severity Assessment  Malnutrition Type: Chronic Disease - Related Malnutrition  Malnutrition Type (Last 8 Hours)       Malnutrition Severity Assessment       Row Name 04/14/24 2248       Malnutrition Severity Assessment    Malnutrition Type Chronic Disease - Related Malnutrition      Row Name 04/14/24 2248       Insufficient Energy Intake     Insufficient Energy Intake Findings Severe    Insufficient Energy Intake  <75% of est. energy requirement for > or equal to 1 month      Row Name 04/14/24 2248       Unintentional Weight Loss     Unintentional Weight Loss Findings Severe    Unintentional Weight Loss  Weight loss greater than 7.5% in three months      Row Name 04/14/24 2248       Muscle Loss    Loss of Muscle Mass Findings Mild    [place] Region --  mild    Clavicle Bone Region --  mild    Acromion Bone Region None    Scapular Bone Region --  mild    Dorsal Hand Region --  mild    Patellar Region None    Anterior Thigh Region None    Posterior Calf Region Moderate - some roundness, slight firmness      Row Name 04/14/24 2248       Fat Loss    Subcutaneous Fat Loss Findings Mild    Orbital Region  --  mild    Upper Arm Region None    Thoracic & Lumbar Region --  mild      Row Name 04/14/24 2248       Criteria Met (Must meet criteria for severity in at least 2 of these categories: M Wasting, Fat Loss, Fluid, Secondary Signs, Wt. Status, Intake)    Patient meets criteria for  Severe Malnutrition  Pt meets criteria for severe chronic malnutrition based on Wt Intake hx w wasting.                    Electronically signed by:  Ellie Dominguez RD  04/14/24 22:58 EDT

## 2024-04-15 NOTE — PROGRESS NOTES
[facility] Medicine Services  PROGRESS NOTE    Patient Name: David Barfield  : 1950  MRN: [id number]    Date of Admission: 2024  Primary Care Physician: Shahid Drake MD    Subjective   Subjective     CC:  Follow-up sepsis    HPI:  970mL UOP. Had black stools overnight per patient's significant other, but not seen by staff. Patient reports feeling a lot better. No back pain. No N/V. No dysuria. No cough or SOA. Patient's significant other is at bedside.     Objective   Objective     Vital Signs:   Temp:  [98 °F (36.7 °C)-98.5 °F (36.9 °C)] 98 °F (36.7 °C)  Heart Rate:  [70-77] 70  Resp:  [16] 16  BP: (105-156)/(65-80) 148/76  Flow (L/min):  [2] 2     Physical Exam:  Constitutional: No acute distress, awake, alert, sitting up in bed  HENT: NCAT, mucous membranes moist  Respiratory: Clear to auscultation, no crackles, respiratory effort normal; port site nontender and no erythema  Cardiovascular: RRR, no murmurs, rubs, or gallops  Gastrointestinal: Normoactive bowel sounds, soft, nondistended  Musculoskeletal: No bilateral ankle edema; right hand edema  Psychiatric: Appropriate affect, cooperative  Neurologic: PERRL, symmetric facies, moves all extremities well, speech clear  Skin: No rashes on exposed skin    Results Reviewed:  LAB RESULTS:      Lab 04/15/24  0416 24  0603 24  2038 24  1611 24  0857 24  0330 24  0158 24  2311 24  1905 04/10/24  1355   WBC 16.58* 22.61*  --   --   --   --  46.05*  --  36.28* 13.92*   HEMOGLOBIN 8.1* 8.7*  --   --   --   --  9.0*  --  12.4* 11.2*   HEMATOCRIT 24.3* 27.2*  --   --   --   --  28.9*  --  40.0 34.5*   PLATELETS 171 176  --   --   --   --  185  --  291 396   NEUTROS ABS 14.09* 19.33*  --   --   --   --  42.12*  --  34.47* 10.73*   IMMATURE GRANS (ABS) 0.11* 0.22*  --   --   --   --  1.50*  --   --  0.02   LYMPHS ABS 1.00 1.46  --   --   --   --  0.64*  --   --  1.92   MONOS ABS 1.20*  1.28*  --   --   --   --  1.67*  --   --  1.04*   EOS ABS 0.16 0.26  --   --   --   --  0.00  --  0.00 0.18   MCV 92.0 97.8*  --   --   --   --  99.3*  --  97.3* 95.8   PROCALCITONIN  --   --   --   --   --   --   --   --  4.88*  --    LACTATE  --   --  1.6 2.5* 2.1* 2.2* 2.1*   < > 6.7*  --     < > = values in this interval not displayed.         Lab 04/15/24  0416 04/14/24  0603 04/13/24  2038 04/13/24  1043 04/13/24  0330 04/13/24  0158 04/12/24  1905 04/10/24  1355   SODIUM 136 133*  --   --  140  --  142 141   POTASSIUM 4.5 4.4 4.5 5.7* 4.7  --  3.4* 4.0   CHLORIDE 104 101  --   --  113*  --  105 104   CO2 21.0* 20.0*  --   --  16.0*  --  21.0* 27.0   ANION GAP 11.0 12.0  --   --  11.0  --  16.0* 10.0   BUN 49* 39*  --   --  25*  --  22 17   CREATININE 6.14* 4.58*  --   --  2.73*  --  2.23* 1.55*   EGFR 8.9* 12.6*  --   --  23.5*  --  30.0* 46.4*   GLUCOSE 102* 95  --   --  104*  --  117* 110*   CALCIUM 7.7* 7.7*  --   --  7.9*  --  9.2 9.7   MAGNESIUM 2.5* 2.6*  --   --   --  1.3* 1.8  1.7  --    PHOSPHORUS 3.4 2.8  --  2.9  --  1.7*  --   --    TSH  --   --   --   --   --  1.150  --   --          Lab 04/14/24  0603 04/13/24  0330 04/12/24  1905 04/10/24  1355   TOTAL PROTEIN 6.0 5.5* 7.9 9.0*   ALBUMIN 2.5* 2.4* 3.6 4.1   GLOBULIN 3.5 3.1 4.3  --    ALT (SGPT) 9 10 15 19   AST (SGOT) 15 21 33 24   BILIRUBIN <0.2 0.2 0.4 0.2   BILIRUBIN DIRECT  --   --   --  <0.2   ALK PHOS 61 50 81 95   LIPASE  --   --  31  --          Lab 04/13/24  0330 04/13/24  0158 04/12/24  1905   HSTROP T 37* 34* 35*                 Brief Urine Lab Results  (Last result in the past 365 days)        Color   Clarity   Blood   Leuk Est   Nitrite   Protein   CREAT   Urine HCG        04/13/24 1632             38.1                 Microbiology Results Abnormal       Procedure Component Value - Date/Time    Blood Culture - Blood, Wrist, Left [[id number]]  (Normal) Collected: 04/12/24 2009    Lab Status: Preliminary result Specimen: Blood from  Wrist, Left Updated: 04/14/24 2100     Blood Culture No growth at 2 days    Blood Culture - Blood, Arm, Right [[id number]]  (Normal) Collected: 04/12/24 2009    Lab Status: Preliminary result Specimen: Blood from Arm, Right Updated: 04/14/24 2100     Blood Culture No growth at 2 days    Urine Culture - Urine, Straight Cath [[id number]]  (Normal) Collected: 04/12/24 2247    Lab Status: Final result Specimen: Urine from Straight Cath Updated: 04/14/24 1206     Urine Culture No growth    Gastrointestinal Panel, PCR - Stool, Per Rectum [[id number]]  (Normal) Collected: 04/14/24 0709    Lab Status: Final result Specimen: Stool from Per Rectum Updated: 04/14/24 0908     Campylobacter Not Detected     Plesiomonas shigelloides Not Detected     Salmonella Not Detected     Vibrio Not Detected     Vibrio cholerae Not Detected     Yersinia enterocolitica Not Detected     Enteroaggregative E. coli (EAEC) Not Detected     Enteropathogenic E. coli (EPEC) Not Detected     Enterotoxigenic E. coli (ETEC) lt/st Not Detected     Shiga-like toxin-producing E. coli (STEC) stx1/stx2 Not Detected     Shigella/Enteroinvasive E. coli (EIEC) Not Detected     Cryptosporidium Not Detected     Cyclospora cayetanensis Not Detected     Entamoeba histolytica Not Detected     Giardia lamblia Not Detected     Adenovirus F40/41 Not Detected     Astrovirus Not Detected     Norovirus GI/GII Not Detected     Rotavirus A Not Detected     Sapovirus (I, II, IV or V) Not Detected    Clostridioides difficile Toxin - Stool, Per Rectum [[id number]]  (Normal) Collected: 04/14/24 0709    Lab Status: Final result Specimen: Stool from Per Rectum Updated: 04/14/24 0817    Narrative:      The following orders were created for panel order Clostridioides difficile Toxin - Stool, Per Rectum.  Procedure                               Abnormality         Status                     ---------                               -----------         ------                      Clostridioides difficile...[[id number]]  Normal              Final result                 Please view results for these tests on the individual orders.    Clostridioides difficile Toxin, PCR - Stool, Per Rectum [[id number]]  (Normal) Collected: 04/14/24 0709    Lab Status: Final result Specimen: Stool from Per Rectum Updated: 04/14/24 0817     Toxigenic C. difficile by PCR Not Detected    Narrative:      The result indicates the absence of toxigenic C. difficile from stool specimen.     Eosinophil Smear - Urine, Urine, Clean Catch [[id number]]  (Normal) Collected: 04/13/24 1631    Lab Status: Final result Specimen: Urine, Clean Catch Updated: 04/13/24 1732     Eosinophil Smear 0 % EOS/100 Cells     Narrative:      No eosinophil seen    MRSA Screen, PCR (Inpatient) - Swab, Nares [[id number]]  (Normal) Collected: 04/12/24 2128    Lab Status: Final result Specimen: Swab from Nares Updated: 04/13/24 0803     MRSA PCR Negative    Narrative:      The negative predictive value of this diagnostic test is high and should only be used to consider de-escalating anti-MRSA therapy. A positive result may indicate colonization with MRSA and must be correlated clinically.  MRSA Negative    COVID PRE-OP / PRE-PROCEDURE SCREENING ORDER (NO ISOLATION) - Swab, Nasopharynx [[id number]]  (Normal) Collected: 04/12/24 2010    Lab Status: Final result Specimen: Swab from Nasopharynx Updated: 04/12/24 2121    Narrative:      The following orders were created for panel order COVID PRE-OP / PRE-PROCEDURE SCREENING ORDER (NO ISOLATION) - Swab, Nasopharynx.  Procedure                               Abnormality         Status                     ---------                               -----------         ------                     Respiratory Panel PCR w/...[[id number]]  Normal              Final result                 Please view results for these tests on the individual orders.    Respiratory Panel PCR w/COVID-19(SARS-CoV-2) [facility]/CYNTHIA/DENA/PAD/COR/JEROME  In-House, NP Swab in [facility], 2 HR TAT - Swab, Nasopharynx [[id number]]  (Normal) Collected: 04/12/24 2010    Lab Status: Final result Specimen: Swab from Nasopharynx Updated: 04/12/24 2121     ADENOVIRUS, PCR Not Detected     Coronavirus 229E Not Detected     Coronavirus HKU1 Not Detected     Coronavirus NL63 Not Detected     Coronavirus OC43 Not Detected     COVID19 Not Detected     Human Metapneumovirus Not Detected     Human Rhinovirus/Enterovirus Not Detected     Influenza A PCR Not Detected     Influenza B PCR Not Detected     Parainfluenza Virus 1 Not Detected     Parainfluenza Virus 2 Not Detected     Parainfluenza Virus 3 Not Detected     Parainfluenza Virus 4 Not Detected     RSV, PCR Not Detected     Bordetella pertussis pcr Not Detected     Bordetella parapertussis PCR Not Detected     Chlamydophila pneumoniae PCR Not Detected     Mycoplasma pneumo by PCR Not Detected    Narrative:      In the setting of a positive respiratory panel with a viral infection PLUS a negative procalcitonin without other underlying concern for bacterial infection, consider observing off antibiotics or discontinuation of antibiotics and continue supportive care. If the respiratory panel is positive for atypical bacterial infection (Bordetella pertussis, Chlamydophila pneumoniae, or Mycoplasma pneumoniae), consider antibiotic de-escalation to target atypical bacterial infection.            No radiology results from the last 24 hrs    Results for orders placed during the hospital encounter of 07/09/21    Adult Transthoracic Echo Complete W/ Cont if Necessary Per Protocol    Interpretation Summary  · Estimated left ventricular EF = 55% Left ventricular systolic function is normal.  · Left ventricular diastolic function was normal.  · Left ventricular wall thickness is consistent with mild concentric hypertrophy.  · Trace mitral and tricuspid regurgitation.      Current medications:  Scheduled Meds:budesonide-formoterol, 2 puff,  Inhalation, BID - RT   And  tiotropium bromide monohydrate, 2 puff, Inhalation, Daily - RT  cefepime, 2,000 mg, Intravenous, Daily  heparin (porcine), 5,000 Units, Subcutaneous, Q8H  nicotine, 1 patch, Transdermal, Q24H  [Held by provider] pantoprazole, 40 mg, Oral, Q AM  pantoprazole, 40 mg, Intravenous, Q12H  pravastatin, 80 mg, Oral, Nightly  sodium chloride, 10 mL, Intravenous, Q12H  tamsulosin, 0.4 mg, Oral, Daily      Continuous Infusions:     PRN Meds:.  acetaminophen **OR** acetaminophen **OR** acetaminophen    Calcium Replacement - Follow Nurse / BPA Driven Protocol    HYDROcodone-acetaminophen    Magnesium Standard Dose Replacement - Follow Nurse / BPA Driven Protocol    nicotine polacrilex    ondansetron    ondansetron ODT **OR** ondansetron    prochlorperazine    sodium chloride    sodium chloride    sodium chloride    Assessment & Plan   Assessment & Plan     Active Hospital Problems    Diagnosis  POA    **Sepsis [A41.9]  Yes    Severe malnutrition [E43]  Yes    ARACELI (acute kidney injury) [N17.9]  Unknown    Hypokalemia [E87.6]  Unknown    Acute pyelonephritis [N10]  Unknown    Primary hypertension [I10]  Yes    Centrilobular emphysema [J43.2]  Yes    Tobacco abuse [Z72.0]  Yes    Presence of cardiac pacemaker [Z95.0]  Yes    Mixed hyperlipidemia [E78.2]  Yes      Resolved Hospital Problems   No resolved problems to display.        Brief Hospital Course to date:  David Barfield is a 74 y.o. male with a PMH significant for non-small cell lung cancer s/p neoadjuvant chemoimmunotherapy and RLL lobectomy (1/2024) currently on Opdivo (last tx 4/4/24), tobacco use disorder, HTN, emphysema who resented to the ER due to nausea, vomiting, diarrhea.  Found to have severe sepsis secondary to acute pyelonephritis in immunosuppressed patient.    This patient's problems and plans were partially entered by my partner and updated as appropriate by me 04/15/24.     Severe sepsis  Acute  pyelonephritis  Immunosuppressed host  Lactic acidosis secondary to above  - Tachycardic, elevated lactic acid, elevated procalcitonin, leukocytosis, source of infection, ARACELI, hypotension  - S/p IVF  - Continue cefepime  - C. difficile and GI panel negative  -- Blood cultures NGTD; urine culture NG final, but imaging and presentation consistent with pyelonephritis, continue abx for now  - Lactic acid normalized with IVF  - ID following     Olguric ARACELI, POA  --Baseline creatinine 0.94-1.1; creatinine was 2 on 4/4 and 1.55 on 4/10; Cr worsening on labs on 4/13 and up to 4.58 on 4/14; could be related to sepsis/infection  --Acute urinary retention protocol  --Hold nephrotoxic medications  --A.m. labs  --Renal consulted  --Failed Lasix trial on 4/14, but now UOP starting to increase  --Holding off on HD for now per discussion with Dr. Gomez  --Strict I&O    Possible GI bleed  --Apparently had black/tarry stools, not seen by staff; requested patient and significant other to save next BM for staff to look at  --Hemoglobin relatively stable only down from 9 --> 8.1 over several days and after significant IVF  --Trend H&H  --PPI BID for now     Non-small cell lung cancer  - Follows with Dr. Gely Ashley  - Last treatment with Opdivo 4/4/2024    Chronic anemia  - Hemoglobin 9 on admission.  The patient's significant other repeatedly questioned me on 4/13 on why the patient was not getting a transfusion of red blood cells, explained to her several times that based on his current hemoglobin, that is not medically necessary at this time and will continue to monitor.     Hypomagnesemia - resolved  - Replace per protocol    Hyperkalemia - resolved  --S/p lokelma      Hypertension  - Hold home lisinopril due to hypotension and ARACELI    Expected Discharge Location and Transportation: TBD  Expected Discharge   Expected Discharge Date: 4/17/2024; Expected Discharge Time:      DVT prophylaxis:  Medical DVT prophylaxis orders are  present.         AM-PAC 6 Clicks Score (PT): 17 (04/14/24 2200)    CODE STATUS:   Code Status and Medical Interventions:   Ordered at: 04/13/24 0017     Level Of Support Discussed With:    Patient     Code Status (Patient has no pulse and is not breathing):    CPR (Attempt to Resuscitate)     Medical Interventions (Patient has pulse or is breathing):    Full Support       Amy Hernandez MD  04/15/24

## 2024-04-16 LAB
ANION GAP SERPL CALCULATED.3IONS-SCNC: 11 MMOL/L (ref 5–15)
BUN SERPL-MCNC: 54 MG/DL (ref 8–23)
BUN/CREAT SERPL: 7.7 (ref 7–25)
CALCIUM SPEC-SCNC: 8 MG/DL (ref 8.6–10.5)
CHLORIDE SERPL-SCNC: 103 MMOL/L (ref 98–107)
CO2 SERPL-SCNC: 21 MMOL/L (ref 22–29)
CREAT SERPL-MCNC: 7.03 MG/DL (ref 0.76–1.27)
DEPRECATED RDW RBC AUTO: 51.1 FL (ref 37–54)
EGFRCR SERPLBLD CKD-EPI 2021: 7.6 ML/MIN/1.73
ERYTHROCYTE [DISTWIDTH] IN BLOOD BY AUTOMATED COUNT: 15.1 % (ref 12.3–15.4)
GLUCOSE SERPL-MCNC: 98 MG/DL (ref 65–99)
HCT VFR BLD AUTO: 24.1 % (ref 37.5–51)
HGB BLD-MCNC: 8 G/DL (ref 13–17.7)
MCH RBC QN AUTO: 30.5 PG (ref 26.6–33)
MCHC RBC AUTO-ENTMCNC: 33.2 G/DL (ref 31.5–35.7)
MCV RBC AUTO: 92 FL (ref 79–97)
PLATELET # BLD AUTO: 179 10*3/MM3 (ref 140–450)
PMV BLD AUTO: 9.3 FL (ref 6–12)
POTASSIUM SERPL-SCNC: 4.5 MMOL/L (ref 3.5–5.2)
RBC # BLD AUTO: 2.62 10*6/MM3 (ref 4.14–5.8)
SODIUM SERPL-SCNC: 135 MMOL/L (ref 136–145)
WBC NRBC COR # BLD AUTO: 11.91 10*3/MM3 (ref 3.4–10.8)

## 2024-04-16 PROCEDURE — 94799 UNLISTED PULMONARY SVC/PX: CPT

## 2024-04-16 PROCEDURE — 80048 BASIC METABOLIC PNL TOTAL CA: CPT | Performed by: STUDENT IN AN ORGANIZED HEALTH CARE EDUCATION/TRAINING PROGRAM

## 2024-04-16 PROCEDURE — 97110 THERAPEUTIC EXERCISES: CPT

## 2024-04-16 PROCEDURE — 94664 DEMO&/EVAL PT USE INHALER: CPT

## 2024-04-16 PROCEDURE — 25010000002 HEPARIN (PORCINE) PER 1000 UNITS: Performed by: INTERNAL MEDICINE

## 2024-04-16 PROCEDURE — 97116 GAIT TRAINING THERAPY: CPT

## 2024-04-16 PROCEDURE — 99232 SBSQ HOSP IP/OBS MODERATE 35: CPT | Performed by: INTERNAL MEDICINE

## 2024-04-16 PROCEDURE — 25010000002 CEFEPIME PER 500 MG: Performed by: INTERNAL MEDICINE

## 2024-04-16 PROCEDURE — 85027 COMPLETE CBC AUTOMATED: CPT | Performed by: STUDENT IN AN ORGANIZED HEALTH CARE EDUCATION/TRAINING PROGRAM

## 2024-04-16 RX ORDER — MIRTAZAPINE 15 MG/1
15 TABLET, FILM COATED ORAL NIGHTLY
Qty: 30 TABLET | Refills: 0 | OUTPATIENT
Start: 2024-04-16

## 2024-04-16 RX ORDER — HYDRALAZINE HYDROCHLORIDE 20 MG/ML
10 INJECTION INTRAMUSCULAR; INTRAVENOUS EVERY 6 HOURS PRN
Status: DISCONTINUED | OUTPATIENT
Start: 2024-04-16 | End: 2024-04-22 | Stop reason: HOSPADM

## 2024-04-16 RX ADMIN — BUDESONIDE AND FORMOTEROL FUMARATE DIHYDRATE 2 PUFF: 160; 4.5 AEROSOL RESPIRATORY (INHALATION) at 08:36

## 2024-04-16 RX ADMIN — Medication 10 ML: at 08:30

## 2024-04-16 RX ADMIN — PANTOPRAZOLE SODIUM 40 MG: 40 INJECTION, POWDER, FOR SOLUTION INTRAVENOUS at 10:49

## 2024-04-16 RX ADMIN — HEPARIN SODIUM 5000 UNITS: 5000 INJECTION INTRAVENOUS; SUBCUTANEOUS at 16:17

## 2024-04-16 RX ADMIN — PRAVASTATIN SODIUM 80 MG: 40 TABLET ORAL at 21:28

## 2024-04-16 RX ADMIN — BUDESONIDE AND FORMOTEROL FUMARATE DIHYDRATE 2 PUFF: 160; 4.5 AEROSOL RESPIRATORY (INHALATION) at 20:27

## 2024-04-16 RX ADMIN — TAMSULOSIN HYDROCHLORIDE 0.4 MG: 0.4 CAPSULE ORAL at 10:49

## 2024-04-16 RX ADMIN — Medication 10 ML: at 21:28

## 2024-04-16 RX ADMIN — CEFEPIME 2000 MG: 2 INJECTION, POWDER, FOR SOLUTION INTRAVENOUS at 10:46

## 2024-04-16 RX ADMIN — PANTOPRAZOLE SODIUM 40 MG: 40 INJECTION, POWDER, FOR SOLUTION INTRAVENOUS at 21:28

## 2024-04-16 RX ADMIN — HEPARIN SODIUM 5000 UNITS: 5000 INJECTION INTRAVENOUS; SUBCUTANEOUS at 21:28

## 2024-04-16 RX ADMIN — TIOTROPIUM BROMIDE INHALATION SPRAY 2 PUFF: 3.12 SPRAY, METERED RESPIRATORY (INHALATION) at 08:36

## 2024-04-16 RX ADMIN — Medication 10 ML: at 10:51

## 2024-04-16 NOTE — PROGRESS NOTES
"   LOS: 4 days    Patient Care Team:  Shahid Drake MD as PCP - General (Family Medicine)  Octaviano Sampson MD as Consulting Physician (Pulmonary Disease)  Neetu Ashley MD as Referring Physician (Hematology and Oncology)  Nimo Rodríguez MD as Consulting Physician (Radiation Oncology)    Subjective     Overall feeling a little better today.    Objective     Vital Signs:  Blood pressure 138/77, pulse 74, temperature 97.7 °F (36.5 °C), temperature source Oral, resp. rate 20, height 182.9 cm (72\"), weight 78 kg (172 lb), SpO2 97%.      Intake/Output Summary (Last 24 hours) at 4/16/2024 0955  Last data filed at 4/16/2024 0915  Gross per 24 hour   Intake 400 ml   Output 1400 ml   Net -1000 ml        04/15 0701 - 04/16 0700  In: -   Out: 1200 [Urine:1200]    Physical Exam:        GENERAL: WD WM NAD  NEURO: Awake and alert, oriented. No focal deficit  PSYCHIATRIC: NMA. Cooperative with PE  EYE: PE, no icterus, no conjunctivitis  ENT: ommm, dentition intact,  Hearing intact  NECK: Supple , No JVD discernable,  Trachea midline  CV: No edema, RRR  LUNGS:  Quiet,  Nonlabored resp.  Symmetrical expansion  ABDOMEN: Nondistended, soft nontender.  : No Yeung, no palp bladder  SKIN: Warm and dry without rash      Labs:  Results from last 7 days   Lab Units 04/16/24  0428 04/15/24  1526 04/15/24  1137 04/15/24  0416 04/14/24  0603   WBC 10*3/mm3 11.91*  --   --  16.58* 22.61*   HEMOGLOBIN g/dL 8.0* 8.4* 9.2* 8.1* 8.7*   PLATELETS 10*3/mm3 179  --   --  171 176     Results from last 7 days   Lab Units 04/16/24  0428 04/15/24  0416 04/14/24  0603 04/13/24  2038 04/13/24  1043 04/13/24  0330 04/13/24  0158 04/12/24  1905 04/10/24  1355   SODIUM mmol/L 135* 136 133*  --   --  140  --  142 141   POTASSIUM mmol/L 4.5 4.5 4.4 4.5 5.7* 4.7  --  3.4* 4.0   CHLORIDE mmol/L 103 104 101  --   --  113*  --  105 104   CO2 mmol/L 21.0* 21.0* 20.0*  --   --  16.0*  --  21.0* 27.0   BUN mg/dL 54* 49* 39*  --   --  25*  --  22 17 "   CREATININE mg/dL 7.03* 6.14* 4.58*  --   --  2.73*  --  2.23* 1.55*   CALCIUM mg/dL 8.0* 7.7* 7.7*  --   --  7.9*  --  9.2 9.7   PHOSPHORUS mg/dL  --  3.4 2.8  --  2.9  --  1.7*  --   --    MAGNESIUM mg/dL  --  2.5* 2.6*  --   --   --  1.3* 1.8  1.7  --    ALBUMIN g/dL  --   --  2.5*  --   --  2.4*  --  3.6 4.1     Results from last 7 days   Lab Units 04/14/24  0603 04/12/24  1905 04/10/24  1355   ALK PHOS U/L 61   < > 95   BILIRUBIN mg/dL <0.2   < > 0.2   BILIRUBIN DIRECT mg/dL  --   --  <0.2   ALT (SGPT) U/L 9   < > 19   AST (SGOT) U/L 15   < > 24    < > = values in this interval not displayed.                   Estimated Creatinine Clearance: 10 mL/min (A) (by C-G formula based on SCr of 7.03 mg/dL (H)).         A/P:    ARF: Creatinine continues to trend up with stable BUN.  Urine output remains nonoliguric and increasing.  Baseline cr ~ 0.8mg/dl. Cr on this admission 2.2-2.7mg/dl. UA large blood, trace protein, large aspen esterase many wbc++. CT showed perinephric fat stranding consistent with pyelonephritis. Urine na 74 urine cl 16.  Patient likely with tubulointerstitial inflammation in the setting of infection with component of ATN due to hemodynamic instability on ACEI I with nausea, vomiting and poor po intake.. 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  Urine eos negative.  Doubt reaction due to antibiotics.  Urine output improving suggesting renal recovery.  No emergent indication for dialysis at this time.  Will monitor for now.  Strict I's and O's.  Monitor renal function closely.  Evaluate daily in a.m. for HD needs.    Patient family asking about renal biopsy.  Would hold on biopsy if renal function improving, however may be of benefit in determining if patient could receive immuno therapy for further lung cancer treatment.  Can discuss further tomorrow.    HTN: Patient initially septic with pyelonephritis.  Improved with volume resuscitation on IVF.  Continues off all blood  pressure medications..  RAAS suppression on hold with ARF.  Cover with as needed medications for now.    Hyponatremia: Improving overnight.  Monitor for now.  All fluids isotonic.    Hyperkalemia: Resolved.  Potassium initially up to 5.7 improved to normal range post Lokelma.  RAAS suppression on hold..    Metabolic acidosis: Stable.  Bicarb initially quite low at 16 with an underlying lactic acidemia due to poor perfusion.  Bicarb improved with perfusion support.  Would monitor for now.    Anemia: Hemoglobin below goal.  Transfuse as indicated for Hgb <7.  Patient with possible GI bleed.  Recent black stools.  Workup underway.    Volume: Urine output increasing.  For now continue strict I's and O's.    Non-small cell lung cancer:  Last treatment with Opdivo 4/4/2024.  Follows with Dr. Gely Ashley.    High risk and complexity patient.    Kam Gomez MD  04/16/24  09:55 EDT

## 2024-04-16 NOTE — PLAN OF CARE
Goal Outcome Evaluation:  Plan of Care Reviewed With: patient, significant other        Progress: improving  Outcome Evaluation: Pt. continues to present below baseline function w/generalized weakness and decreased functional endurance affecting his ability to safely participate in functional mobility. He performed bed mobility, transfers and ambulated 400' w/contact guard assist. Activity limited by fatigue. He tolerated progression in ther-ex well. Continue IPPT POC to progress as tolerated.      Anticipated Discharge Disposition (PT): home with assist, home with home health

## 2024-04-16 NOTE — PROGRESS NOTES
INFECTIOUS DISEASE Progress note     David Barfield  1950  [id number]        Admission Date: 4/12/2024      Requesting Provider: No Known Provider  Evaluating Physician: Derian Barry MD    Reason for Consultation: sepsis     History of present illness:    David Barfield is a 74 y.o. male, with PMH NSCLC/RLL lobectomy (1/2024) currently on Opdivo, HTN, emphysema, seen today for sepsis. Last Opdivo was last Thursday,  presented to the ED with complaints of  nausea, vomiting, lower back pain, and diarrhea. He has been afebrile. Admitting labs with WBC 36.28, plt 291, LAC 6.7, PCT 4.88, Scr 2.23, ALT 15, AST 33, negative respiratory panel , and UA with TNTC WBCS. CXR with chronic findings.  CT concerning with bilateral pyelonephritis, circumferential wall thickening of the urinary bladder, no abscess, hydronephrosis. Started on Vancomycin , Ertapenem and we were consulted for evaluation and treatment.      4/14/24:  Tmax of 99.1. Blood cultures remain negative to date. One episode of diarrhea this am.  Still with moderate amount of sputum production with cough.  Complains of right sided rib pain with cough.  Wife at bedside. Am labs pending     4/15/24: The patient remains afebrile.  He states he is feeling somewhat better.  No flank pain or abdominal pain today.  Nausea has improved.  Still having some intermittent diarrhea. No dysuria today.  Renal function is worse in his creatinine is over 6. Blood cell count is improving and was down to 16.58 today    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    Diabetes mellitus Borderline    Emphysema/COPD     Erectile disorder     GERD (gastroesophageal reflux disease)     History of chemotherapy     Hyperlipidemia     Hypertension 2019    Lung nodule     Mumps     Mumps     Pruritus     after bath    Slow to wake up after anesthesia     Wears  dentures     upper only    Wears hearing aid in both ears     usually only wears right       Past Surgical History:   Procedure Laterality Date    BONE BIOPSY      broken bone surgery in his face    BRONCHOSCOPY THORACOTOMY Right 1/9/2024    Procedure: THORACOTOMY FOR LOWER LOBECTOMY AND MEDISTINAL LYMPH NODE DISSECTION RIGHT;  Surgeon: Joey Patel MD;  Location: [facility] OR;  Service: Cardiothoracic;  Laterality: Right;    BRONCHOSCOPY WITH ION ROBOTIC ASSIST N/A 09/15/2023    Procedure: BRONCHOSCOPY NAVIGATION WITH ENDOBRONCHIAL ULTRASOUND AND ION ROBOT;  Surgeon: Octaviano Sampson MD;  Location:  [facility];  Service: Robotics - Pulmonary;  Laterality: N/A;  ion #6 - 0032  - 0015  Cath guide 0061    EBUS balloon removed and intact    CARDIAC ELECTROPHYSIOLOGY PROCEDURE N/A 08/17/2021    Procedure: Pacemaker DC new;  Surgeon: Kayy Box MD;  Location:  [facility];  Service: Cardiology;  Laterality: N/A;    FACIAL FRACTURE SURGERY      PACEMAKER IMPLANTATION         Family History   Problem Relation Age of Onset    Aneurysm Mother         brain    Dementia Father     Leukemia Sister     Heart disease Paternal Grandmother     Hypertension Paternal Grandfather        Social History     Socioeconomic History    Marital status: Significant Other    Number of children: 3   Tobacco Use    Smoking status: Former     Current packs/day: 0.50     Average packs/day: 0.5 packs/day for 56.3 years (28.6 ttl pk-yrs)     Types: Cigarettes     Start date: 1/1/1968    Smokeless tobacco: Never    Tobacco comments:     Pt states that he quit smoking on 1/8/24. The day before his sx.    Vaping Use    Vaping status: Never Used   Substance and Sexual Activity    Alcohol use: Never    Drug use: Never    Sexual activity: Defer     Partners: Female     Birth control/protection: None       Allergies   Allergen Reactions    Cymbalta [Duloxetine Hcl] GI Intolerance    Gabapentin Mental Status Change  "    Pt states that this medication \"makes him feel foolish in his head\".     Remeron [Mirtazapine] Other (See Comments)     Excess sedation    Toradol [Ketorolac Tromethamine] GI Intolerance     Projectile vomiting     Latex Other (See Comments)     Latex allergy     Tape Rash         Medication:    Current Facility-Administered Medications:     acetaminophen (TYLENOL) tablet 650 mg, 650 mg, Oral, Q4H PRN, 650 mg at 04/13/24 2212 **OR** acetaminophen (TYLENOL) 160 MG/5ML oral solution 650 mg, 650 mg, Oral, Q4H PRN **OR** acetaminophen (TYLENOL) suppository 650 mg, 650 mg, Rectal, Q4H PRN, DaylinTatiana coonsie G, DO    budesonide-formoterol (SYMBICORT) 160-4.5 MCG/ACT inhaler 2 puff, 2 puff, Inhalation, BID - RT, 2 puff at 04/15/24 2024 **AND** tiotropium (SPIRIVA RESPIMAT) 2.5 mcg/act aerosol solution inhaler, 2 puff, Inhalation, Daily - RT, DaylinTatiana coonsie G, DO, 2 puff at 04/15/24 0905    Calcium Replacement - Follow Nurse / BPA Driven Protocol, , Does not apply, PRN, Daylin Rosalia G, DO    cefepime 2000 mg IVPB in 100 mL NS (MBP), 2,000 mg, Intravenous, Daily, Derian Barry MD, 2,000 mg at 04/15/24 0837    heparin (porcine) 5000 UNIT/ML injection 5,000 Units, 5,000 Units, Subcutaneous, Q8H, DaylinTatiana coonsie G, DO, 5,000 Units at 04/15/24 1404    HYDROcodone-acetaminophen (NORCO) 7.5-325 MG per tablet 1 tablet, 1 tablet, Oral, Q6H PRN, DaylinTatiana coonsie G, DO, 1 tablet at 04/13/24 1827    Magnesium Standard Dose Replacement - Follow Nurse / BPA Driven Protocol, , Does not apply, PRN, Daylin, Rosalia G, DO    nicotine (NICODERM CQ) 21 MG/24HR patch 1 patch, 1 patch, Transdermal, Q24H, DaylinTatianaRosalia G, DO, 1 patch at 04/15/24 0612    nicotine polacrilex (NICORETTE) gum 4 mg, 4 mg, Mouth/Throat, Q1H PRN, Roaslia Mao,     ondansetron (ZOFRAN) injection 4 mg, 4 mg, Intravenous, Q6H PRN, Rosalia Mao, DO    ondansetron ODT (ZOFRAN-ODT) disintegrating tablet 4 mg, 4 mg, Oral, Q6H PRN **OR** ondansetron (ZOFRAN) injection 4 " mg, 4 mg, Intravenous, Q6H PRN, Daylin, Rosalia G, DO    [Held by provider] pantoprazole (PROTONIX) EC tablet 40 mg, 40 mg, Oral, Q AM, Daylin, Rosalia G, DO, 40 mg at 24 0532    pantoprazole (PROTONIX) injection 40 mg, 40 mg, Intravenous, Q12H, Amy Hernandez MD, 40 mg at 04/15/24 0838    pravastatin (PRAVACHOL) tablet 80 mg, 80 mg, Oral, Nightly, Daylin, Rosalia G, DO, 80 mg at 24    prochlorperazine (COMPAZINE) tablet 5 mg, 5 mg, Oral, Q6H PRN, Daylin, Rosalia G, DO    sodium chloride 0.9 % flush 10 mL, 10 mL, Intravenous, PRN, Daylin, Rosalia G, DO    sodium chloride 0.9 % flush 10 mL, 10 mL, Intravenous, Q12H, Daylin, Rosalia G, DO, 10 mL at 04/15/24 0838    sodium chloride 0.9 % flush 10 mL, 10 mL, Intravenous, PRN, Daylin, Rosalia G, DO    sodium chloride 0.9 % infusion 40 mL, 40 mL, Intravenous, PRN, Daylin, Rosalia G, DO    tamsulosin (FLOMAX) 24 hr capsule 0.4 mg, 0.4 mg, Oral, Daily, Daylin, Rosalia G, DO, 0.4 mg at 04/15/24 0838    Antibiotics:  Anti-Infectives (From admission, onward)      Ordered     Dose/Rate Route Frequency Start Stop    24 1025  cefepime 2000 mg IVPB in 100 mL NS (MBP)        Ordering Provider: Derian Barry MD    2,000 mg  over 4 Hours Intravenous Daily 24 0900 24 0859    24 1025  cefepime 2000 mg IVPB in 100 mL NS (MBP)        Ordering Provider: Derian Barry MD    2,000 mg  over 30 Minutes Intravenous Once 24 1115 24 1301    24 1922  vancomycin IVPB 1750 mg in 0.9% Sodium Chloride (premix) 500 mL        Ordering Provider: Michelle Iyer PA-C    22 mg/kg × 78 kg  285.7 mL/hr over 105 Minutes Intravenous Once 24 23324 192  piperacillin-tazobactam (ZOSYN) 3.375 g IVPB in 100 mL NS MBP (CD)        Ordering Provider: Michelle Iyer PA-C    3.375 g  over 30 Minutes Intravenous Once 24                Physical Exam:   Vital Signs  Temp (24hrs), Av.9 °F (36.6 °C), Min:97.5 °F  (36.4 °C), Max:98.5 °F (36.9 °C)    Temp  Min: 97.5 °F (36.4 °C)  Max: 98.5 °F (36.9 °C)  BP  Min: 148/76  Max: 163/82  Pulse  Min: 70  Max: 77  Resp  Min: 16  Max: 20  SpO2  Min: 90 %  Max: 98 %    GENERAL: Awake and alert, in no acute distress. Sitting up on bedside  HEENT: Normocephalic, atraumatic.  No external oral lesions noted  NECK: supple   HEART: RRR; No murmur  LUNGS: CTA B.  Nonlabored breathing on room air  ABDOMEN: Soft, nontender, nondistended. No rebound or guarding.   EXT:  No edema.  No cellulitic change noted  :  Without Yeung catheter.  MSK: No joint effusions or erythema. No CVA tenderness on exam today  SKIN: No generalized rashes noted.  No peripheral stigmata of infective endocarditis noted.   NEURO: Oriented to PPT.  Normal speech and cognition.  PSYCHIATRIC: Normal insight and judgment. Cooperative with PE    Right PPM site without redness, tenderness  Left PAC site without redness     Laboratory Data    Results from last 7 days   Lab Units 04/15/24  1526 04/15/24  1137 04/15/24  0416 04/14/24  0603 04/13/24  0158   WBC 10*3/mm3  --   --  16.58* 22.61* 46.05*   HEMOGLOBIN g/dL 8.4* 9.2* 8.1* 8.7* 9.0*   HEMATOCRIT % 25.1* 29.2* 24.3* 27.2* 28.9*   PLATELETS 10*3/mm3  --   --  171 176 185     Results from last 7 days   Lab Units 04/15/24  0416   SODIUM mmol/L 136   POTASSIUM mmol/L 4.5   CHLORIDE mmol/L 104   CO2 mmol/L 21.0*   BUN mg/dL 49*   CREATININE mg/dL 6.14*   GLUCOSE mg/dL 102*   CALCIUM mg/dL 7.7*     Results from last 7 days   Lab Units 04/14/24  0603 04/12/24  1905 04/10/24  1355   ALK PHOS U/L 61   < > 95   BILIRUBIN mg/dL <0.2   < > 0.2   BILIRUBIN DIRECT mg/dL  --   --  <0.2   ALT (SGPT) U/L 9   < > 19   AST (SGOT) U/L 15   < > 24    < > = values in this interval not displayed.             Results from last 7 days   Lab Units 04/13/24  2038   LACTATE mmol/L 1.6     Results from last 7 days   Lab Units 04/13/24  1043   CK TOTAL U/L 33         Estimated Creatinine Clearance:  11.5 mL/min (A) (by C-G formula based on SCr of 6.14 mg/dL (H)).      Microbiology:  Blood culture ×2 NGSF     Urine culture: No growth    Respiratory PCR panel: Negative      Radiology:  Imaging Results (Last 72 Hours)       ** No results found for the last 72 hours. **            Impression:   Sepsis, manifested by leukocytosis, acute kidney injury, hypotension, tachycardia, lactic acidosis -Suspect from urinary source. Urine and blood cultures are no growth.  Sepsis is improving.  Pyuria/UTI/bilateral pyelonephritis per CT- Urine culture was no growth  Acute kidney injury-ATN in the setting of sepsis/hypotension-Worsening. No eosinophilia in his CBC with differential and no urine eosinophils indicating that this is not AIN.  Severe leukocytosis with neutrophilia-Improving  NSCLC, s/p lobectomy, now on immunotherapy   Nausea and vomiting-likely secondary to pyelonephritis-Improved  Diarrhea, C. Difficile test was negative.  Ongoing intermittent diarrhea  Hypophosphatemia  Macrocytic anemia  10. Nonsmall cell lung cancer of the RLL, Stage IIIA     PLAN/RECOMMENDATIONS:   - Cefepime per pharmacy dosing. This will provide coverage for Pseudomonas and other gram-negative rods.  No history of resistant bacteria per the patient's report and he has not had a lot of urinary tract infections in the past.  - Follow urine and blood cultures-Remain no growth  - Continue to follow CBC and CMP closely  - C. Difficile test was negative. GI PCR panel was negative    Renal function is worsening.  May need dialysis soon if no improvement.  Nephrology is following.    I discussed in length with the patient and his daughter at bedside today    Copied text in this note has been reviewed and is accurate as of 04/15/24    Complex MDM    Derian Barry MD  4/15/2024  20:44 EDT

## 2024-04-16 NOTE — THERAPY TREATMENT NOTE
Patient Name: David Barfield  : 1950    MRN: [id number]                              Today's Date: 2024       Admit Date: 2024    Visit Dx:     ICD-10-CM ICD-9-CM   1. Sepsis with acute renal failure without septic shock, due to unspecified organism, unspecified acute renal failure type  A41.9 038.9    R65.20 995.92    N17.9 584.9   2. Pyelonephritis  N12 590.80   3. Nausea vomiting and diarrhea  R11.2 787.91    R19.7 787.01   4. Leukocytosis, unspecified type  D72.829 288.60   5. Lactic acidosis  E87.20 276.2   6. Generalized weakness  R53.1 780.79   7. Postural dizziness with near syncope  R42 780.4    R55 780.2   8. Bronchogenic cancer of right lung  C34.91 162.9   9. Immunosuppression  D84.9 279.9   10. Elevated procalcitonin  R79.89 790.99   11. History of emphysema  J43.9 492.8   12. History of diabetes mellitus  Z86.39 V12.29   13. History of hypertension  Z86.79 V12.59   14. Former smoker  Z87.891 V15.82     Patient Active Problem List   Diagnosis    High degree atrioventricular block    Bradycardia, sinus    Chronic hypotension    PVC's (premature ventricular contractions)    Presence of cardiac pacemaker    Mixed hyperlipidemia    Centrilobular emphysema    Nodule of lower lobe of right lung    Mediastinal adenopathy    Cough    Tobacco abuse    Bronchogenic cancer of right lung    Malignant neoplasm of lower lobe of right lung    Primary hypertension    GERD without esophagitis    Sepsis    Acute pyelonephritis    ARACELI (acute kidney injury)    Hypokalemia    Severe malnutrition     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    Diabetes mellitus Borderline    Emphysema/COPD     Erectile disorder     GERD (gastroesophageal reflux disease)     History of chemotherapy     Hyperlipidemia     Hypertension 2019    Lung nodule     Mumps     Mumps     Pruritus     after bath    Slow  to wake up after anesthesia     Wears dentures     upper only    Wears hearing aid in both ears     usually only wears right     Past Surgical History:   Procedure Laterality Date    BONE BIOPSY      broken bone surgery in his face    BRONCHOSCOPY THORACOTOMY Right 1/9/2024    Procedure: THORACOTOMY FOR LOWER LOBECTOMY AND MEDISTINAL LYMPH NODE DISSECTION RIGHT;  Surgeon: Joey Patel MD;  Location:  [facility];  Service: Cardiothoracic;  Laterality: Right;    BRONCHOSCOPY WITH ION ROBOTIC ASSIST N/A 09/15/2023    Procedure: BRONCHOSCOPY NAVIGATION WITH ENDOBRONCHIAL ULTRASOUND AND ION ROBOT;  Surgeon: Octaviano Sampson MD;  Location:  [facility];  Service: Robotics - Pulmonary;  Laterality: N/A;  ion #6 - 0032  - 0015  Cath guide 0061    EBUS balloon removed and intact    CARDIAC ELECTROPHYSIOLOGY PROCEDURE N/A 08/17/2021    Procedure: Pacemaker DC new;  Surgeon: Kayy Box MD;  Location:  [facility];  Service: Cardiology;  Laterality: N/A;    FACIAL FRACTURE SURGERY      PACEMAKER IMPLANTATION        General Information       Row Name 04/16/24 1554          Physical Therapy Time and Intention    Document Type therapy note (daily note)  -SS     Mode of Treatment physical therapy  -SS       Row Name 04/16/24 6587          General Information    Patient Profile Reviewed yes  -SS     Existing Precautions/Restrictions fall;other (see comments)  RLL lobectomy (1/24)  -SS     Barriers to Rehab previous functional deficit;medically complex  -SS       Row Name 04/16/24 1522          Cognition    Orientation Status (Cognition) oriented x 3  -SS       Row Name 04/16/24 1559          Safety Issues, Functional Mobility    Safety Issues Affecting Function (Mobility) awareness of need for assistance;insight into deficits/self-awareness;safety precaution awareness;safety precautions follow-through/compliance;judgment;sequencing abilities  -SS     Impairments Affecting Function  (Mobility) balance;endurance/activity tolerance;shortness of breath;postural/trunk control;strength  -               User Key  (r) = Recorded By, (t) = Taken By, (c) = Cosigned By      Initials Name Provider Type    SS Amanda Scanlon PT Physical Therapist                   Mobility       Row Name 04/16/24 1559          Bed Mobility    Bed Mobility scooting/bridging;supine-sit;sit-supine  -SS     Scooting/Bridging [place] (Bed Mobility) standby assist;verbal cues  -SS     Supine-Sit [place] (Bed Mobility) standby assist;verbal cues  -SS     Sit-Supine [place] (Bed Mobility) standby assist;verbal cues  -SS     Assistive Device (Bed Mobility) head of bed elevated;bed rails  -     Comment, (Bed Mobility) VC for sequencing  -       Row Name 04/16/24 5260          Sit-Stand Transfer    Sit-Stand [place] (Transfers) contact guard;verbal cues  -SS     Assistive Device (Sit-Stand Transfers) other (see comments)  none  -SS     Comment, (Sit-Stand Transfer) VC for hand placement, appropriate alignment, lowering with eccentric control  -       Row Name 04/16/24 3119          Gait/Stairs (Locomotion)    [place] Level (Gait) contact guard;verbal cues  -SS     Assistive Device (Gait) other (see comments)  none  -SS     Patient was able to Ambulate yes  -SS     Distance in Feet (Gait) 400  -SS     Deviations/Abnormal Patterns (Gait) bilateral deviations;melissa decreased;gait speed decreased;stride length decreased  -SS     Bilateral Gait Deviations heel strike decreased;forward flexed posture  -     Comment, (Gait/Stairs) Pt. ambulated with a step through gait pattern. VC for upright posture, controlled breathing, paced activity. Activity limited by fatigue. O2 at 95% on room air, ROBERT noted. HR at 118 bpm.  -               User Key  (r) = Recorded By, (t) = Taken By, (c) = Cosigned By      Initials Name Provider Type    Amanda Forte PT Physical Therapist                    Obj/Interventions       Row Name 04/16/24 1600          Motor Skills    Motor Skills functional endurance  -     Functional Endurance modified RPE: 6/10  -     Therapeutic Exercise hip;knee;ankle  -       Row Name 04/16/24 1600          Hip (Therapeutic Exercise)    Hip (Therapeutic Exercise) isometric exercises;strengthening exercise  -     Hip Isometrics (Therapeutic Exercise) bilateral;gluteal sets;10 repetitions  -     Hip Strengthening (Therapeutic Exercise) bilateral;aDduction;aBduction;external rotation;internal rotation;heel slides;marching while seated;10 repetitions  -[facility] Name 04/16/24 1600          Knee (Therapeutic Exercise)    Knee (Therapeutic Exercise) isometric exercises;strengthening exercise  -     Knee Isometrics (Therapeutic Exercise) bilateral;quad sets;10 repetitions  -     Knee Strengthening (Therapeutic Exercise) bilateral;SLR (straight leg raise);LAQ (long arc quad);10 repetitions  -[facility] Name 04/16/24 1600          Ankle (Therapeutic Exercise)    Ankle (Therapeutic Exercise) AROM (active range of motion)  -     Ankle AROM (Therapeutic Exercise) bilateral;dorsiflexion;plantarflexion;10 repetitions  -[facility] Name 04/16/24 1600          Balance    Balance Assessment sitting static balance;sitting dynamic balance;sit to stand dynamic balance;standing static balance;standing dynamic balance  -     Static Sitting Balance independent  -     Dynamic Sitting Balance independent  -     Position, Sitting Balance sitting edge of bed;unsupported  -     Sit to Stand Dynamic Balance contact guard  -     Static Standing Balance contact guard  -     Dynamic Standing Balance contact guard  -     Position/Device Used, Standing Balance unsupported  -     Balance Interventions sitting;standing;sit to stand;supported;static;dynamic  -               User Key  (r) = Recorded By, (t) = Taken By, (c) = Cosigned By      Initials Name Provider Type    MICHAEL Scanlon  Amanda, DENEEN Physical Therapist                   Goals/Plan       Row Name 04/16/24 1603          Transfer Goal 1 (PT)    Activity/Assistive Device (Transfer Goal 1, PT) sit-to-stand/stand-to-sit;bed-to-chair/chair-to-bed  -SS     [place] Level/Cues Needed (Transfer Goal 1, PT) independent  -SS     Time Frame (Transfer Goal 1, PT) long term goal (LTG);10 days  -SS     Progress/Outcome (Transfer Goal 1, PT) good progress toward goal;goal revised this date  -SS       Row Name 04/16/24 1603          Gait Training Goal 1 (PT)    Activity/Assistive Device (Gait Training Goal 1, PT) gait (walking locomotion)  -SS     [place] Level (Gait Training Goal 1, PT) independent  -SS     Distance (Gait Training Goal 1, PT) 800  -SS     Time Frame (Gait Training Goal 1, PT) long term goal (LTG);10 days  -SS     Progress/Outcome (Gait Training Goal 1, PT) good progress toward goal;goal revised this date  -SS               User Key  (r) = Recorded By, (t) = Taken By, (c) = Cosigned By      Initials Name Provider Type    SS Amanda Scanlon, DENEEN Physical Therapist                   Clinical Impression       Row Name 04/16/24 1601          Pain    Pretreatment Pain Rating 0/10 - no pain  -SS     Posttreatment Pain Rating 0/10 - no pain  -SS     Pain Intervention(s) Repositioned;Ambulation/increased activity;Elevated  -SS     Additional Documentation Pain Scale: Numbers Pre/Post-Treatment (Group)  -SS       Row Name 04/16/24 1601          Plan of Care Review    Plan of Care Reviewed With patient;significant other  -     Progress improving  -SS     Outcome Evaluation Pt. continues to present below baseline function w/generalized weakness and decreased functional endurance affecting his ability to safely participate in functional mobility. He performed bed mobility, transfers and ambulated 400' w/contact guard assist. Activity limited by fatigue. He tolerated progression in ther-ex well. Continue IPPT POC to progress as tolerated.   -       Row Name 04/16/24 1601          Therapy Assessment/Plan (PT)    Rehab Potential (PT) good, to achieve stated therapy goals  -     Criteria for Skilled Interventions Met (PT) yes;meets criteria;skilled treatment is necessary  -     Therapy Frequency (PT) daily  -SS       Row Name 04/16/24 1601          Vital Signs    Pre Systolic BP Rehab 167  -SS     Pre Treatment Diastolic BP 93  -SS     Post Systolic BP Rehab 173  -SS     Post Treatment Diastolic BP 83  -SS     Pretreatment Heart Rate (beats/min) 71  -SS     Posttreatment Heart Rate (beats/min) 81  -SS     Pre SpO2 (%) 90  -SS     O2 Delivery Pre Treatment room air  -SS     Post SpO2 (%) 95  -SS     O2 Delivery Post Treatment room air  -SS     Pre Patient Position Supine  -SS     Post Patient Position Supine  -SS       Row Name 04/16/24 1601          Positioning and Restraints    Pre-Treatment Position in bed  -SS     Post Treatment Position bed  -SS     In Bed notified nsg;fowlers;call light within reach;encouraged to call for assist;exit alarm on;with family/caregiver;legs elevated  -               User Key  (r) = Recorded By, (t) = Taken By, (c) = Cosigned By      Initials Name Provider Type    SS Amanda Scanlon, PT Physical Therapist                   Outcome Measures       Row Name 04/16/24 1604 04/16/24 0830       How much help from another person do you currently need...    Turning from your back to your side while in flat bed without using bedrails? 4  -SS 4 (P)   -JG    Moving from lying on back to sitting on the side of a flat bed without bedrails? 3  -SS 4 (P)   -JG    Moving to and from a bed to a chair (including a wheelchair)? 3  -SS 3 (P)   -JG    Standing up from a chair using your arms (e.g., wheelchair, bedside chair)? 3  -SS 3 (P)   -JG    Climbing 3-5 steps with a railing? 3  -SS 3 (P)   -JG    To walk in hospital room? 3  -SS 3 (P)   -JG    AM-PAC 6 Clicks Score (PT) 19  -SS 20 (P)   -JG    Highest Level of Mobility Goal 6 -->  Walk 10 steps or more  - 6 --> Walk 10 steps or more (P)   -SARIKA      Row Name 04/16/24 1604          Functional Assessment    Outcome Measure Options AM-PAC 6 Clicks Basic Mobility (PT)  -               User Key  (r) = Recorded By, (t) = Taken By, (c) = Cosigned By      Initials Name Provider Type     Amanda Scanlon, PT Physical Therapist    Yanira Shannon, RN Extern Registered Nurse                                 Physical Therapy Education       Title: PT OT SLP Therapies (Done)       Topic: Physical Therapy (Done)       Point: Mobility training (Done)       Learning Progress Summary             Patient Eager, E, VU,DU,NR by SS at 4/16/2024 1604    Comment: Reviewed safety/technique w/bed mobility, transfers, ambulation, HEP, pursed lip breathing, paced activity, PT POC    Acceptance, E,D, VU,NR by AB at 4/15/2024 1541    Acceptance, E, VU by AE at 4/14/2024 1320                         Point: Home exercise program (Done)       Learning Progress Summary             Patient Eager, E, VU,DU,NR by SS at 4/16/2024 1604    Comment: Reviewed safety/technique w/bed mobility, transfers, ambulation, HEP, pursed lip breathing, paced activity, PT POC    Acceptance, E,D, VU,NR by AB at 4/15/2024 1541                         Point: Body mechanics (Done)       Learning Progress Summary             Patient Eager, E, VU,DU,NR by SS at 4/16/2024 1604    Comment: Reviewed safety/technique w/bed mobility, transfers, ambulation, HEP, pursed lip breathing, paced activity, PT POC    Acceptance, E,D, VU,NR by AB at 4/15/2024 1541    Acceptance, E, VU by AE at 4/14/2024 1320                         Point: Precautions (Done)       Learning Progress Summary             Patient Eager, E, VU,DU,NR by SS at 4/16/2024 1604    Comment: Reviewed safety/technique w/bed mobility, transfers, ambulation, HEP, pursed lip breathing, paced activity, PT POC    Acceptance, E,D, VU,NR by AB at 4/15/2024 1541    Acceptance, E, VU by AE at  4/14/2024 1320                                         User Key       Initials Effective Dates Name Provider Type Discipline    SS 06/01/21 -  Amanda Scanlon, PT Physical Therapist PT    AE 09/21/21 -  Jeff Carbone, PT Physical Therapist PT    AB 09/22/22 -  Marie Becker, PT Physical Therapist PT                  PT Recommendation and Plan     Plan of Care Reviewed With: patient, significant other  Progress: improving  Outcome Evaluation: Pt. continues to present below baseline function w/generalized weakness and decreased functional endurance affecting his ability to safely participate in functional mobility. He performed bed mobility, transfers and ambulated 400' w/contact guard assist. Activity limited by fatigue. He tolerated progression in ther-ex well. Continue IPPT POC to progress as tolerated.     Time Calculation:         PT Charges       Row Name 04/16/24 1605             Time Calculation    Start Time 1512  -SS      PT Received On 04/16/24  -SS         Timed Charges    21927 - PT Therapeutic Exercise Minutes 10  -SS      32993 - Gait Training Minutes  10  -SS      04170 - PT Therapeutic Activity Minutes 3  -SS         Total Minutes    Timed Charges Total Minutes 23  -SS       Total Minutes 23  -SS                User Key  (r) = Recorded By, (t) = Taken By, (c) = Cosigned By      Initials Name Provider Type    SS Amanda Scanlon, PT Physical Therapist                  Therapy Charges for Today       Code Description Service Date Service Provider Modifiers Qty    66176362167 HC PT THER PROC EA 15 MIN 4/16/2024 Amanda Scanlon, PT GP 1    62055596708 HC GAIT TRAINING EA 15 MIN 4/16/2024 Amanda Scalnon, PT GP 1            PT G-Codes  Outcome Measure Options: AM-PAC 6 Clicks Basic Mobility (PT)  AM-PAC 6 Clicks Score (PT): 19  PT Discharge Summary  Anticipated Discharge Disposition (PT): home with assist, home with home health    Amanda Scanlon PT  4/16/2024

## 2024-04-16 NOTE — PROGRESS NOTES
[facility] Medicine Services  PROGRESS NOTE    Patient Name: David Barfield  : 1950  MRN: [id number]    Date of Admission: 2024  Primary Care Physician: Shahid Drake MD    Subjective   Subjective     CC:  Follow-up sepsis    HPI:  Patient reports he continues to have good urine output. Denies shortness of breath, dizziness, lightheadedness.    Objective   Objective     Vital Signs:   Temp:  [97.6 °F (36.4 °C)-98.4 °F (36.9 °C)] 98.1 °F (36.7 °C)  Heart Rate:  [70-79] 79  Resp:  [16-20] 20  BP: (138-163)/(67-82) 151/82     Physical Exam:  Constitutional: No acute distress, awake, alert  HENT: NCAT, mucous membranes moist  Respiratory: Clear to auscultation bilaterally, respiratory effort normal   Cardiovascular: RRR, no murmurs, rubs, or gallops  Gastrointestinal: soft, nontender, nondistended  Musculoskeletal: No bilateral ankle edema  Psychiatric: Appropriate affect, cooperative  Neurologic: Oriented x 3, speech clear, no focal deficits  Skin: No rashes      Results Reviewed:  LAB RESULTS:      Lab 24  0428 04/15/24  1526 04/15/24  1137 04/15/24  0416 24  0603 24  2038 24  1611 24  0857 24  0330 24  0158 24  2311 24  1905 04/10/24  1355   WBC 11.91*  --   --  16.58* 22.61*  --   --   --   --  46.05*  --  36.28* 13.92*   HEMOGLOBIN 8.0* 8.4* 9.2* 8.1* 8.7*  --   --   --   --  9.0*  --  12.4* 11.2*   HEMATOCRIT 24.1* 25.1* 29.2* 24.3* 27.2*  --   --   --   --  28.9*  --  40.0 34.5*   PLATELETS 179  --   --  171 176  --   --   --   --  185  --  291 396   NEUTROS ABS  --   --   --  14.09* 19.33*  --   --   --   --  42.12*  --  34.47* 10.73*   IMMATURE GRANS (ABS)  --   --   --  0.11* 0.22*  --   --   --   --  1.50*  --   --  0.02   LYMPHS ABS  --   --   --  1.00 1.46  --   --   --   --  0.64*  --   --  1.92   MONOS ABS  --   --   --  1.20* 1.28*  --   --   --   --  1.67*  --   --  1.04*   EOS ABS  --   --   --  0.16 0.26   --   --   --   --  0.00  --  0.00 0.18   MCV 92.0  --   --  92.0 97.8*  --   --   --   --  99.3*  --  97.3* 95.8   PROCALCITONIN  --   --   --   --   --   --   --   --   --   --   --  4.88*  --    LACTATE  --   --   --   --   --  1.6 2.5* 2.1* 2.2* 2.1*   < > 6.7*  --     < > = values in this interval not displayed.         Lab 04/16/24  0428 04/15/24  0416 04/14/24  0603 04/13/24 2038 04/13/24  1043 04/13/24  0330 04/13/24  0158 04/12/24  1905   SODIUM 135* 136 133*  --   --  140  --  142   POTASSIUM 4.5 4.5 4.4 4.5 5.7* 4.7  --  3.4*   CHLORIDE 103 104 101  --   --  113*  --  105   CO2 21.0* 21.0* 20.0*  --   --  16.0*  --  21.0*   ANION GAP 11.0 11.0 12.0  --   --  11.0  --  16.0*   BUN 54* 49* 39*  --   --  25*  --  22   CREATININE 7.03* 6.14* 4.58*  --   --  2.73*  --  2.23*   EGFR 7.6* 8.9* 12.6*  --   --  23.5*  --  30.0*   GLUCOSE 98 102* 95  --   --  104*  --  117*   CALCIUM 8.0* 7.7* 7.7*  --   --  7.9*  --  9.2   MAGNESIUM  --  2.5* 2.6*  --   --   --  1.3* 1.8  1.7   PHOSPHORUS  --  3.4 2.8  --  2.9  --  1.7*  --    TSH  --   --   --   --   --   --  1.150  --          Lab 04/14/24  0603 04/13/24 0330 04/12/24  1905 04/10/24  1355   TOTAL PROTEIN 6.0 5.5* 7.9 9.0*   ALBUMIN 2.5* 2.4* 3.6 4.1   GLOBULIN 3.5 3.1 4.3  --    ALT (SGPT) 9 10 15 19   AST (SGOT) 15 21 33 24   BILIRUBIN <0.2 0.2 0.4 0.2   BILIRUBIN DIRECT  --   --   --  <0.2   ALK PHOS 61 50 81 95   LIPASE  --   --  31  --          Lab 04/13/24  0330 04/13/24  0158 04/12/24  1905   HSTROP T 37* 34* 35*                 Brief Urine Lab Results  (Last result in the past 365 days)        Color   Clarity   Blood   Leuk Est   Nitrite   Protein   CREAT   Urine HCG        04/13/24 1632             38.1                 Microbiology Results Abnormal       Procedure Component Value - Date/Time    Blood Culture - Blood, Wrist, Left [[id number]]  (Normal) Collected: 04/12/24 2009    Lab Status: Preliminary result Specimen: Blood from Wrist, Left Updated:  04/15/24 2100     Blood Culture No growth at 3 days    Blood Culture - Blood, Arm, Right [[id number]]  (Normal) Collected: 04/12/24 2009    Lab Status: Preliminary result Specimen: Blood from Arm, Right Updated: 04/15/24 2100     Blood Culture No growth at 3 days    Urine Culture - Urine, Straight Cath [[id number]]  (Normal) Collected: 04/12/24 2247    Lab Status: Final result Specimen: Urine from Straight Cath Updated: 04/14/24 1206     Urine Culture No growth    Gastrointestinal Panel, PCR - Stool, Per Rectum [[id number]]  (Normal) Collected: 04/14/24 0709    Lab Status: Final result Specimen: Stool from Per Rectum Updated: 04/14/24 0908     Campylobacter Not Detected     Plesiomonas shigelloides Not Detected     Salmonella Not Detected     Vibrio Not Detected     Vibrio cholerae Not Detected     Yersinia enterocolitica Not Detected     Enteroaggregative E. coli (EAEC) Not Detected     Enteropathogenic E. coli (EPEC) Not Detected     Enterotoxigenic E. coli (ETEC) lt/st Not Detected     Shiga-like toxin-producing E. coli (STEC) stx1/stx2 Not Detected     Shigella/Enteroinvasive E. coli (EIEC) Not Detected     Cryptosporidium Not Detected     Cyclospora cayetanensis Not Detected     Entamoeba histolytica Not Detected     Giardia lamblia Not Detected     Adenovirus F40/41 Not Detected     Astrovirus Not Detected     Norovirus GI/GII Not Detected     Rotavirus A Not Detected     Sapovirus (I, II, IV or V) Not Detected    Clostridioides difficile Toxin - Stool, Per Rectum [[id number]]  (Normal) Collected: 04/14/24 0709    Lab Status: Final result Specimen: Stool from Per Rectum Updated: 04/14/24 0817    Narrative:      The following orders were created for panel order Clostridioides difficile Toxin - Stool, Per Rectum.  Procedure                               Abnormality         Status                     ---------                               -----------         ------                     Clostridioides  difficile...[[id number]]  Normal              Final result                 Please view results for these tests on the individual orders.    Clostridioides difficile Toxin, PCR - Stool, Per Rectum [[id number]]  (Normal) Collected: 04/14/24 0709    Lab Status: Final result Specimen: Stool from Per Rectum Updated: 04/14/24 0817     Toxigenic C. difficile by PCR Not Detected    Narrative:      The result indicates the absence of toxigenic C. difficile from stool specimen.     Eosinophil Smear - Urine, Urine, Clean Catch [[id number]]  (Normal) Collected: 04/13/24 1631    Lab Status: Final result Specimen: Urine, Clean Catch Updated: 04/13/24 1732     Eosinophil Smear 0 % EOS/100 Cells     Narrative:      No eosinophil seen    MRSA Screen, PCR (Inpatient) - Swab, Nares [[id number]]  (Normal) Collected: 04/12/24 2128    Lab Status: Final result Specimen: Swab from Nares Updated: 04/13/24 0803     MRSA PCR Negative    Narrative:      The negative predictive value of this diagnostic test is high and should only be used to consider de-escalating anti-MRSA therapy. A positive result may indicate colonization with MRSA and must be correlated clinically.  MRSA Negative    COVID PRE-OP / PRE-PROCEDURE SCREENING ORDER (NO ISOLATION) - Swab, Nasopharynx [[id number]]  (Normal) Collected: 04/12/24 2010    Lab Status: Final result Specimen: Swab from Nasopharynx Updated: 04/12/24 2121    Narrative:      The following orders were created for panel order COVID PRE-OP / PRE-PROCEDURE SCREENING ORDER (NO ISOLATION) - Swab, Nasopharynx.  Procedure                               Abnormality         Status                     ---------                               -----------         ------                     Respiratory Panel PCR w/...[[id number]]  Normal              Final result                 Please view results for these tests on the individual orders.    Respiratory Panel PCR w/COVID-19(SARS-CoV-2) [facility] In-House, NP Swab  in UTM/VTM, 2 HR TAT - Swab, Nasopharynx [[id number]]  (Normal) Collected: 04/12/24 2010    Lab Status: Final result Specimen: Swab from Nasopharynx Updated: 04/12/24 2121     ADENOVIRUS, PCR Not Detected     Coronavirus 229E Not Detected     Coronavirus HKU1 Not Detected     Coronavirus NL63 Not Detected     Coronavirus OC43 Not Detected     COVID19 Not Detected     Human Metapneumovirus Not Detected     Human Rhinovirus/Enterovirus Not Detected     Influenza A PCR Not Detected     Influenza B PCR Not Detected     Parainfluenza Virus 1 Not Detected     Parainfluenza Virus 2 Not Detected     Parainfluenza Virus 3 Not Detected     Parainfluenza Virus 4 Not Detected     RSV, PCR Not Detected     Bordetella pertussis pcr Not Detected     Bordetella parapertussis PCR Not Detected     Chlamydophila pneumoniae PCR Not Detected     Mycoplasma pneumo by PCR Not Detected    Narrative:      In the setting of a positive respiratory panel with a viral infection PLUS a negative procalcitonin without other underlying concern for bacterial infection, consider observing off antibiotics or discontinuation of antibiotics and continue supportive care. If the respiratory panel is positive for atypical bacterial infection (Bordetella pertussis, Chlamydophila pneumoniae, or Mycoplasma pneumoniae), consider antibiotic de-escalation to target atypical bacterial infection.            No radiology results from the last 24 hrs    Results for orders placed during the hospital encounter of 07/09/21    Adult Transthoracic Echo Complete W/ Cont if Necessary Per Protocol    Interpretation Summary  · Estimated left ventricular EF = 55% Left ventricular systolic function is normal.  · Left ventricular diastolic function was normal.  · Left ventricular wall thickness is consistent with mild concentric hypertrophy.  · Trace mitral and tricuspid regurgitation.      Current medications:  Scheduled Meds:budesonide-formoterol, 2 puff, Inhalation, BID -  RT   And  tiotropium bromide monohydrate, 2 puff, Inhalation, Daily - RT  cefepime, 2,000 mg, Intravenous, Daily  heparin (porcine), 5,000 Units, Subcutaneous, Q8H  nicotine, 1 patch, Transdermal, Q24H  [Held by provider] pantoprazole, 40 mg, Oral, Q AM  pantoprazole, 40 mg, Intravenous, Q12H  pravastatin, 80 mg, Oral, Nightly  sodium chloride, 10 mL, Intravenous, Q12H  tamsulosin, 0.4 mg, Oral, Daily      Continuous Infusions:     PRN Meds:.  acetaminophen **OR** acetaminophen **OR** acetaminophen    Calcium Replacement - Follow Nurse / BPA Driven Protocol    HYDROcodone-acetaminophen    Magnesium Standard Dose Replacement - Follow Nurse / BPA Driven Protocol    nicotine polacrilex    ondansetron    ondansetron ODT **OR** ondansetron    prochlorperazine    sodium chloride    sodium chloride    sodium chloride    Assessment & Plan   Assessment & Plan     Active Hospital Problems    Diagnosis  POA    **Sepsis [A41.9]  Yes    Severe malnutrition [E43]  Yes    ARACELI (acute kidney injury) [N17.9]  Unknown    Hypokalemia [E87.6]  Unknown    Acute pyelonephritis [N10]  Unknown    Primary hypertension [I10]  Yes    Centrilobular emphysema [J43.2]  Yes    Tobacco abuse [Z72.0]  Yes    Presence of cardiac pacemaker [Z95.0]  Yes    Mixed hyperlipidemia [E78.2]  Yes      Resolved Hospital Problems   No resolved problems to display.        Brief Hospital Course to date:  David Barfield is a 74 y.o. male with a PMH significant for non-small cell lung cancer s/p neoadjuvant chemoimmunotherapy and RLL lobectomy (1/2024) currently on Opdivo (last tx 4/4/24), tobacco use disorder, HTN, emphysema who resented to the ER due to nausea, vomiting, diarrhea.  Found to have severe sepsis secondary to acute pyelonephritis in immunosuppressed patient.    This patient's problems and plans were partially entered by my partner and updated as appropriate by me 04/16/24.     Severe sepsis - POA w/tachycardia, lactic acidosis, elevated procal,  leukocytosis, hypotension  Acute pyelonephritis  Immunosuppressed host  - Continue cefepime, ID following  - C. difficile and GI panel negative  -- Blood cultures NGTD; urine culture NG final, but imaging and presentation consistent with pyelonephritis, continue abx for now  - Lactic acid normalized with IVF     Olguric ARACELI, POA  --Baseline creatinine 0.94-1.1, continues to worsen, now >7  --patient reports normal UOP  --Failed Lasix trial on 4/14  --Nephrology following, initiation of HD per Dr. Gomez    Possible GI bleed  Chronic Anemia  --Hemoglobin relatively stable, was given a lot of IVF, likely dilutional component  --PPI on hold due to worsening renal fxn  --transfuse PRN Hgb <7     Non-small cell lung cancer  - Follows with Dr. Gely Ashley  - Last treatment with Opdivo 4/4/2024     Hypomagnesemia - resolved  - Replace per protocol    Hyperkalemia - resolved  --S/p lokelma      Hypertension  - Hold home lisinopril due to hypotension and ARACELI    Expected Discharge Location and Transportation: TBD  Expected Discharge   Expected Discharge Date: 4/17/2024; Expected Discharge Time:      DVT prophylaxis:  Medical DVT prophylaxis orders are present.         AM-PAC 6 Clicks Score (PT): (P) 20 (04/16/24 0830)    CODE STATUS:   Code Status and Medical Interventions:   Ordered at: 04/13/24 0017     Level Of Support Discussed With:    Patient     Code Status (Patient has no pulse and is not breathing):    CPR (Attempt to Resuscitate)     Medical Interventions (Patient has pulse or is breathing):    Full Support       Dionne Samaniego, DO  04/16/24

## 2024-04-16 NOTE — PLAN OF CARE
Goal Outcome Evaluation:  Plan of Care Reviewed With: (P) patient        Progress: (P) improving  Outcome Evaluation: (P) NSR on tele, intermittent paced beats. Hypertensive today. O2 sats maintained on RA. 590 mL of UOP this shift. Discharge plan home with outpatient follow-up when medically ready.

## 2024-04-16 NOTE — PROGRESS NOTES
INFECTIOUS DISEASE Progress note     David Barfield  1950  [id number]        Admission Date: 4/12/2024      Requesting Provider: No Known Provider  Evaluating Physician: Derian Barry MD    Reason for Consultation: sepsis     History of present illness:    David Barfield is a 74 y.o. male, with PMH NSCLC/RLL lobectomy (1/2024) currently on Opdivo, HTN, emphysema, seen today for sepsis. Last Opdivo was last Thursday,  presented to the ED with complaints of  nausea, vomiting, lower back pain, and diarrhea. He has been afebrile. Admitting labs with WBC 36.28, plt 291, LAC 6.7, PCT 4.88, Scr 2.23, ALT 15, AST 33, negative respiratory panel , and UA with TNTC WBCS. CXR with chronic findings.  CT concerning with bilateral pyelonephritis, circumferential wall thickening of the urinary bladder, no abscess, hydronephrosis. Started on Vancomycin , Ertapenem and we were consulted for evaluation and treatment.      4/14/24:  Tmax of 99.1. Blood cultures remain negative to date. One episode of diarrhea this am.  Still with moderate amount of sputum production with cough.  Complains of right sided rib pain with cough.  Wife at bedside. Am labs pending     4/15/24: The patient remains afebrile.  He states he is feeling somewhat better.  No flank pain or abdominal pain today.  Nausea has improved.  Still having some intermittent diarrhea. No dysuria today.  Renal function is worse in his creatinine is over 6. Blood cell count is improving and was down to 16.58 today    4/16/24: The patient remains afebrile.  He is feeling better and is eating better today.  No flank pain.  No nausea or vomiting.  Diarrhea has improved.  Urine output is improving. Creatinine is worse today at 7.03.  White blood cell count is improving to 11.9 today.    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    Diabetes mellitus  Borderline    Emphysema/COPD     Erectile disorder     GERD (gastroesophageal reflux disease)     History of chemotherapy     Hyperlipidemia     Hypertension 2019    Lung nodule     Mumps     Mumps     Pruritus     after bath    Slow to wake up after anesthesia     Wears dentures     upper only    Wears hearing aid in both ears     usually only wears right       Past Surgical History:   Procedure Laterality Date    BONE BIOPSY      broken bone surgery in his face    BRONCHOSCOPY THORACOTOMY Right 1/9/2024    Procedure: THORACOTOMY FOR LOWER LOBECTOMY AND MEDISTINAL LYMPH NODE DISSECTION RIGHT;  Surgeon: Joey Patel MD;  Location:  [facility];  Service: Cardiothoracic;  Laterality: Right;    BRONCHOSCOPY WITH ION ROBOTIC ASSIST N/A 09/15/2023    Procedure: BRONCHOSCOPY NAVIGATION WITH ENDOBRONCHIAL ULTRASOUND AND ION ROBOT;  Surgeon: Octaviano Sampson MD;  Location:  [facility];  Service: Robotics - Pulmonary;  Laterality: N/A;  ion #6 - 0032  - 0015  Cath guide 0061    EBUS balloon removed and intact    CARDIAC ELECTROPHYSIOLOGY PROCEDURE N/A 08/17/2021    Procedure: Pacemaker DC new;  Surgeon: Kayy Box MD;  Location:  [facility];  Service: Cardiology;  Laterality: N/A;    FACIAL FRACTURE SURGERY      PACEMAKER IMPLANTATION         Family History   Problem Relation Age of Onset    Aneurysm Mother         brain    Dementia Father     Leukemia Sister     Heart disease Paternal Grandmother     Hypertension Paternal Grandfather        Social History     Socioeconomic History    Marital status: Significant Other    Number of children: 3   Tobacco Use    Smoking status: Former     Current packs/day: 0.50     Average packs/day: 0.5 packs/day for 56.3 years (28.6 ttl pk-yrs)     Types: Cigarettes     Start date: 1/1/1968    Smokeless tobacco: Never    Tobacco comments:     Pt states that he quit smoking on 1/8/24. The day before his sx.    Vaping Use    Vaping status: Never  "Used   Substance and Sexual Activity    Alcohol use: Never    Drug use: Never    Sexual activity: Defer     Partners: Female     Birth control/protection: None       Allergies   Allergen Reactions    Cymbalta [Duloxetine Hcl] GI Intolerance    Gabapentin Mental Status Change     Pt states that this medication \"makes him feel foolish in his head\".     Remeron [Mirtazapine] Other (See Comments)     Excess sedation    Toradol [Ketorolac Tromethamine] GI Intolerance     Projectile vomiting     Latex Other (See Comments)     Latex allergy     Tape Rash         Medication:    Current Facility-Administered Medications:     acetaminophen (TYLENOL) tablet 650 mg, 650 mg, Oral, Q4H PRN, 650 mg at 04/13/24 2212 **OR** acetaminophen (TYLENOL) 160 MG/5ML oral solution 650 mg, 650 mg, Oral, Q4H PRN **OR** acetaminophen (TYLENOL) suppository 650 mg, 650 mg, Rectal, Q4H PRN, Daylin, Rosalia G, DO    budesonide-formoterol (SYMBICORT) 160-4.5 MCG/ACT inhaler 2 puff, 2 puff, Inhalation, BID - RT, 2 puff at 04/16/24 0836 **AND** tiotropium (SPIRIVA RESPIMAT) 2.5 mcg/act aerosol solution inhaler, 2 puff, Inhalation, Daily - RT, Daylin, Rosalia G, DO, 2 puff at 04/16/24 0836    Calcium Replacement - Follow Nurse / BPA Driven Protocol, , Does not apply, PRN, Daylin, Rosalia G, DO    cefepime 2000 mg IVPB in 100 mL NS (MBP), 2,000 mg, Intravenous, Daily, Derian Barry MD, 2,000 mg at 04/16/24 1046    heparin (porcine) 5000 UNIT/ML injection 5,000 Units, 5,000 Units, Subcutaneous, Q8H, Daylin, Rosalia G, DO, 5,000 Units at 04/16/24 1617    hydrALAZINE (APRESOLINE) injection 10 mg, 10 mg, Intravenous, Q6H PRN, Dionne Samaniego,     HYDROcodone-acetaminophen (NORCO) 7.5-325 MG per tablet 1 tablet, 1 tablet, Oral, Q6H PRN, Daylin, Rosalia G, DO, 1 tablet at 04/13/24 1827    Magnesium Standard Dose Replacement - Follow Nurse / BPA Driven Protocol, , Does not apply, PRN, Rosalia Mao DO    nicotine (NICODERM CQ) 21 MG/24HR patch 1 patch, 1 " patch, Transdermal, Q24H, Daylin, Rosalia G, DO, 1 patch at 04/15/24 0612    nicotine polacrilex (NICORETTE) gum 4 mg, 4 mg, Mouth/Throat, Q1H PRN, Daylin, Rosalia G, DO    ondansetron (ZOFRAN) injection 4 mg, 4 mg, Intravenous, Q6H PRN, Daylin, Rosalia G, DO    ondansetron ODT (ZOFRAN-ODT) disintegrating tablet 4 mg, 4 mg, Oral, Q6H PRN **OR** ondansetron (ZOFRAN) injection 4 mg, 4 mg, Intravenous, Q6H PRN, Daylin, Rosalia G, DO    [Held by provider] pantoprazole (PROTONIX) EC tablet 40 mg, 40 mg, Oral, Q AM, Daylin, Rosalia G, DO, 40 mg at 04/13/24 0532    pantoprazole (PROTONIX) injection 40 mg, 40 mg, Intravenous, Q12H, Amy Hernandez MD, 40 mg at 04/16/24 1049    pravastatin (PRAVACHOL) tablet 80 mg, 80 mg, Oral, Nightly, Daylin, Rosalia G, DO, 80 mg at 04/15/24 2126    prochlorperazine (COMPAZINE) tablet 5 mg, 5 mg, Oral, Q6H PRN, Daylin, Rosalia G, DO    sodium chloride 0.9 % flush 10 mL, 10 mL, Intravenous, PRN, Daylin, Rosalia G, DO, 10 mL at 04/16/24 1051    sodium chloride 0.9 % flush 10 mL, 10 mL, Intravenous, Q12H, Daylin, Rosalia G, DO, 10 mL at 04/16/24 0830    sodium chloride 0.9 % flush 10 mL, 10 mL, Intravenous, PRN, Daylin, Rosalia G, DO    sodium chloride 0.9 % infusion 40 mL, 40 mL, Intravenous, PRN, Daylin, Rosalia G, DO    tamsulosin (FLOMAX) 24 hr capsule 0.4 mg, 0.4 mg, Oral, Daily, Daylin, Rosalia G, DO, 0.4 mg at 04/16/24 1049    Antibiotics:  Anti-Infectives (From admission, onward)      Ordered     Dose/Rate Route Frequency Start Stop    04/13/24 1025  cefepime 2000 mg IVPB in 100 mL NS (MBP)        Ordering Provider: Derian Barry MD    2,000 mg  over 4 Hours Intravenous Daily 04/14/24 0900 04/28/24 0859    04/13/24 1025  cefepime 2000 mg IVPB in 100 mL NS (MBP)        Ordering Provider: Derian Barry MD    2,000 mg  over 30 Minutes Intravenous Once 04/13/24 1115 04/13/24 1301    04/12/24 1922  vancomycin IVPB 1750 mg in 0.9% Sodium Chloride (premix) 500 mL        Ordering Provider: Michelle Iyer,  ANGELIC    22 mg/kg × 78 kg  285.7 mL/hr over 105 Minutes Intravenous Once 24 2335    24 192  piperacillin-tazobactam (ZOSYN) 3.375 g IVPB in 100 mL NS MBP (CD)        Ordering Provider: Michelle Iyer PA-C    3.375 g  over 30 Minutes Intravenous Once 24 2148                Physical Exam:   Vital Signs  Temp (24hrs), Av.1 °F (36.7 °C), Min:97.7 °F (36.5 °C), Max:98.4 °F (36.9 °C)    Temp  Min: 97.7 °F (36.5 °C)  Max: 98.4 °F (36.9 °C)  BP  Min: 138/77  Max: 177/88  Pulse  Min: 70  Max: 100  Resp  Min: 16  Max: 20  SpO2  Min: 81 %  Max: 100 %    GENERAL: Awake and alert, in no acute distress. Sitting up on bedside.  Eating  HEENT: Normocephalic, atraumatic.  No external oral lesions noted  HEART: RRR; No murmur  LUNGS: CTA B.  Nonlabored breathing on room air  ABDOMEN: Soft, nontender, nondistended. No rebound or guarding.   EXT:  No edema.  No cellulitic change noted  :  Without Yeung catheter.  MSK: No joint effusions or erythema. No CVA tenderness bilaterally  SKIN: No new generalized rashes  NEURO: Oriented to PPT.  Normal speech and cognition.  PSYCHIATRIC: Normal insight and judgment. Cooperative with PE    Right PPM site without redness, tenderness    Left PAC site without redness     Laboratory Data    Results from last 7 days   Lab Units 24  0428 04/15/24  1526 04/15/24  1137 04/15/24  0416 24  0603   WBC 10*3/mm3 11.91*  --   --  16.58* 22.61*   HEMOGLOBIN g/dL 8.0* 8.4* 9.2* 8.1* 8.7*   HEMATOCRIT % 24.1* 25.1* 29.2* 24.3* 27.2*   PLATELETS 10*3/mm3 179  --   --  171 176     Results from last 7 days   Lab Units 24  0428   SODIUM mmol/L 135*   POTASSIUM mmol/L 4.5   CHLORIDE mmol/L 103   CO2 mmol/L 21.0*   BUN mg/dL 54*   CREATININE mg/dL 7.03*   GLUCOSE mg/dL 98   CALCIUM mg/dL 8.0*     Results from last 7 days   Lab Units 24  0603 24  1905 04/10/24  1355   ALK PHOS U/L 61   < > 95   BILIRUBIN mg/dL <0.2   < > 0.2   BILIRUBIN  DIRECT mg/dL  --   --  <0.2   ALT (SGPT) U/L 9   < > 19   AST (SGOT) U/L 15   < > 24    < > = values in this interval not displayed.             Results from last 7 days   Lab Units 04/13/24  2038   LACTATE mmol/L 1.6     Results from last 7 days   Lab Units 04/13/24  1043   CK TOTAL U/L 33         Estimated Creatinine Clearance: 10 mL/min (A) (by C-G formula based on SCr of 7.03 mg/dL (H)).      Microbiology:  Blood culture ×2 NGSF     Urine culture: No growth    Respiratory PCR panel: Negative      Radiology:  Imaging Results (Last 72 Hours)       ** No results found for the last 72 hours. **            Impression:   Sepsis, manifested by leukocytosis, acute kidney injury, hypotension, tachycardia, lactic acidosis -Suspect from urinary source. Urine and blood cultures are no growth.  Sepsis is improving.  Pyuria/UTI/bilateral pyelonephritis per CT- Urine culture was no growth  Acute kidney injury-ATN in the setting of sepsis/hypotension-Worsening. No eosinophilia in his CBC with differential and no urine eosinophils indicating that this is not AIN.  Severe leukocytosis with neutrophilia-Improving  NSCLC, s/p lobectomy, now on immunotherapy   Nausea and vomiting-likely secondary to pyelonephritis-Improved  Diarrhea, C. Difficile test was negative.  Ongoing intermittent diarrhea  Hypophosphatemia  Macrocytic anemia  10. Nonsmall cell lung cancer of the RLL, Stage IIIA     PLAN/RECOMMENDATIONS:   - Cefepime per pharmacy dosing. This will provide coverage for Pseudomonas and other gram-negative rods.  No history of resistant bacteria per the patient's report and he has not had a lot of urinary tract infections in the past.  - Follow urine and blood cultures-Remain no growth  - Continue to follow CBC and CMP closely  - C. Difficile test was negative. GI PCR panel was negative    Renal function is worsening.  May need dialysis soon if no improvement.  Nephrology is following.    I discussed in length with the patient  and his Wife at bedside today    I discussed with Dr. Gomez today. He agrees that cefepime is not likely contributing to her renal dysfunction. Holding off on renal biopsy for now but may consider if no improvement in renal function.  Urine output is improving.  Continuing to assess need for dialysis.    Copied text in this note has been reviewed and is accurate as of 04/16/24    Complex MDM    Derian Barry MD  4/16/2024  18:46 EDT

## 2024-04-17 LAB
ANION GAP SERPL CALCULATED.3IONS-SCNC: 14 MMOL/L (ref 5–15)
BACTERIA SPEC AEROBE CULT: NORMAL
BACTERIA SPEC AEROBE CULT: NORMAL
BASOPHILS # BLD AUTO: 0.02 10*3/MM3 (ref 0–0.2)
BASOPHILS NFR BLD AUTO: 0.2 % (ref 0–1.5)
BUN SERPL-MCNC: 53 MG/DL (ref 8–23)
BUN/CREAT SERPL: 6.3 (ref 7–25)
CALCIUM SPEC-SCNC: 7.9 MG/DL (ref 8.6–10.5)
CHLORIDE SERPL-SCNC: 102 MMOL/L (ref 98–107)
CO2 SERPL-SCNC: 19 MMOL/L (ref 22–29)
CREAT SERPL-MCNC: 8.37 MG/DL (ref 0.76–1.27)
DEPRECATED RDW RBC AUTO: 50.3 FL (ref 37–54)
EGFRCR SERPLBLD CKD-EPI 2021: 6.1 ML/MIN/1.73
EOSINOPHIL # BLD AUTO: 0.24 10*3/MM3 (ref 0–0.4)
EOSINOPHIL NFR BLD AUTO: 2.3 % (ref 0.3–6.2)
ERYTHROCYTE [DISTWIDTH] IN BLOOD BY AUTOMATED COUNT: 15 % (ref 12.3–15.4)
GLUCOSE SERPL-MCNC: 98 MG/DL (ref 65–99)
HCT VFR BLD AUTO: 25 % (ref 37.5–51)
HGB BLD-MCNC: 8.4 G/DL (ref 13–17.7)
IMM GRANULOCYTES # BLD AUTO: 0.06 10*3/MM3 (ref 0–0.05)
IMM GRANULOCYTES NFR BLD AUTO: 0.6 % (ref 0–0.5)
LYMPHOCYTES # BLD AUTO: 1.09 10*3/MM3 (ref 0.7–3.1)
LYMPHOCYTES NFR BLD AUTO: 10.4 % (ref 19.6–45.3)
MCH RBC QN AUTO: 30.5 PG (ref 26.6–33)
MCHC RBC AUTO-ENTMCNC: 33.6 G/DL (ref 31.5–35.7)
MCV RBC AUTO: 90.9 FL (ref 79–97)
MONOCYTES # BLD AUTO: 1.19 10*3/MM3 (ref 0.1–0.9)
MONOCYTES NFR BLD AUTO: 11.3 % (ref 5–12)
NEUTROPHILS NFR BLD AUTO: 7.9 10*3/MM3 (ref 1.7–7)
NEUTROPHILS NFR BLD AUTO: 75.2 % (ref 42.7–76)
NRBC BLD AUTO-RTO: 0 /100 WBC (ref 0–0.2)
PLATELET # BLD AUTO: 185 10*3/MM3 (ref 140–450)
PMV BLD AUTO: 9.2 FL (ref 6–12)
POTASSIUM SERPL-SCNC: 4.2 MMOL/L (ref 3.5–5.2)
RBC # BLD AUTO: 2.75 10*6/MM3 (ref 4.14–5.8)
SODIUM SERPL-SCNC: 135 MMOL/L (ref 136–145)
WBC NRBC COR # BLD AUTO: 10.5 10*3/MM3 (ref 3.4–10.8)

## 2024-04-17 PROCEDURE — 80048 BASIC METABOLIC PNL TOTAL CA: CPT | Performed by: INTERNAL MEDICINE

## 2024-04-17 PROCEDURE — 94799 UNLISTED PULMONARY SVC/PX: CPT

## 2024-04-17 PROCEDURE — 25010000002 CEFEPIME PER 500 MG: Performed by: INTERNAL MEDICINE

## 2024-04-17 PROCEDURE — 99232 SBSQ HOSP IP/OBS MODERATE 35: CPT | Performed by: INTERNAL MEDICINE

## 2024-04-17 PROCEDURE — 85025 COMPLETE CBC W/AUTO DIFF WBC: CPT | Performed by: INTERNAL MEDICINE

## 2024-04-17 PROCEDURE — 25010000002 HEPARIN (PORCINE) PER 1000 UNITS: Performed by: INTERNAL MEDICINE

## 2024-04-17 PROCEDURE — 25010000002 SODIUM CHLORIDE 0.9 % WITH KCL 20 MEQ 20-0.9 MEQ/L-% SOLUTION: Performed by: INTERNAL MEDICINE

## 2024-04-17 PROCEDURE — 94761 N-INVAS EAR/PLS OXIMETRY MLT: CPT

## 2024-04-17 RX ORDER — AMLODIPINE BESYLATE 5 MG/1
5 TABLET ORAL
Status: DISCONTINUED | OUTPATIENT
Start: 2024-04-17 | End: 2024-04-22 | Stop reason: HOSPADM

## 2024-04-17 RX ORDER — SODIUM CHLORIDE AND POTASSIUM CHLORIDE 150; 900 MG/100ML; MG/100ML
125 INJECTION, SOLUTION INTRAVENOUS CONTINUOUS
Status: ACTIVE | OUTPATIENT
Start: 2024-04-17 | End: 2024-04-18

## 2024-04-17 RX ADMIN — Medication 10 ML: at 21:27

## 2024-04-17 RX ADMIN — BUDESONIDE AND FORMOTEROL FUMARATE DIHYDRATE 2 PUFF: 160; 4.5 AEROSOL RESPIRATORY (INHALATION) at 20:16

## 2024-04-17 RX ADMIN — CEFEPIME 2000 MG: 2 INJECTION, POWDER, FOR SOLUTION INTRAVENOUS at 08:31

## 2024-04-17 RX ADMIN — TIOTROPIUM BROMIDE INHALATION SPRAY 2 PUFF: 3.12 SPRAY, METERED RESPIRATORY (INHALATION) at 09:48

## 2024-04-17 RX ADMIN — POTASSIUM CHLORIDE AND SODIUM CHLORIDE 125 ML/HR: 900; 150 INJECTION, SOLUTION INTRAVENOUS at 17:36

## 2024-04-17 RX ADMIN — PRAVASTATIN SODIUM 80 MG: 40 TABLET ORAL at 21:27

## 2024-04-17 RX ADMIN — Medication 1 PATCH: at 21:34

## 2024-04-17 RX ADMIN — PANTOPRAZOLE SODIUM 40 MG: 40 INJECTION, POWDER, FOR SOLUTION INTRAVENOUS at 08:31

## 2024-04-17 RX ADMIN — HEPARIN SODIUM 5000 UNITS: 5000 INJECTION INTRAVENOUS; SUBCUTANEOUS at 14:32

## 2024-04-17 RX ADMIN — BUDESONIDE AND FORMOTEROL FUMARATE DIHYDRATE 2 PUFF: 160; 4.5 AEROSOL RESPIRATORY (INHALATION) at 09:48

## 2024-04-17 RX ADMIN — TAMSULOSIN HYDROCHLORIDE 0.4 MG: 0.4 CAPSULE ORAL at 08:31

## 2024-04-17 RX ADMIN — AMLODIPINE BESYLATE 5 MG: 5 TABLET ORAL at 12:22

## 2024-04-17 RX ADMIN — Medication 10 ML: at 08:36

## 2024-04-17 RX ADMIN — PANTOPRAZOLE SODIUM 40 MG: 40 INJECTION, POWDER, FOR SOLUTION INTRAVENOUS at 21:27

## 2024-04-17 NOTE — CASE MANAGEMENT/SOCIAL WORK
Continued Stay Note  [facility]     Patient Name: David Barfield  MRN: [id number]  Today's Date: 4/17/2024    Admit Date: 4/12/2024    Plan: Home   Discharge Plan       Row Name 04/17/24 1026       Plan    Plan Comments Plan remains for pt to discharge home when ready. Pt had no current discharge needs or concerns. MSW continues to follow for discharge needs as arise.                   Discharge Codes    No documentation.                 Expected Discharge Date and Time       Expected Discharge Date Expected Discharge Time    Apr 17, 2024               TOLU Colon

## 2024-04-17 NOTE — PROGRESS NOTES
[facility] Medicine Services  PROGRESS NOTE    Patient Name: David Barfield  : 1950  MRN: [id number]    Date of Admission: 2024  Primary Care Physician: Shahid Drake MD    Subjective   Subjective     CC:  Follow-up sepsis    HPI:  Patient sitting up in bed. Does not want renal biopsy as he is scared of bleeding complications, urinated 750ml this morning.    Objective   Objective     Vital Signs:   Temp:  [97.7 °F (36.5 °C)-98.3 °F (36.8 °C)] 97.7 °F (36.5 °C)  Heart Rate:  [] 71  Resp:  [16-20] 16  BP: (143-177)/(75-93) 175/86     Physical Exam:  Constitutional: No acute distress, awake, alert  HENT: NCAT, mucous membranes moist  Respiratory: Clear to auscultation bilaterally, respiratory effort normal   Cardiovascular: RRR, no murmurs, rubs, or gallops  Gastrointestinal: soft, nontender, nondistended  Musculoskeletal: No bilateral ankle edema  Psychiatric: Appropriate affect, cooperative  Neurologic: Oriented x 3, speech clear, no focal deficits  Skin: No rashes        Results Reviewed:  LAB RESULTS:      Lab 24  0528 24  0428 04/15/24  1526 04/15/24  1137 04/15/24  0416 24  0603 24  2038 24  1611 24  0857 24  0330 24  0158 24  2311 24  1905 04/10/24  1355   WBC 10.50 11.91*  --   --  16.58* 22.61*  --   --   --   --  46.05*  --  36.28* 13.92*   HEMOGLOBIN 8.4* 8.0* 8.4* 9.2* 8.1* 8.7*  --   --   --   --  9.0*  --  12.4* 11.2*   HEMATOCRIT 25.0* 24.1* 25.1* 29.2* 24.3* 27.2*  --   --   --   --  28.9*  --  40.0 34.5*   PLATELETS 185 179  --   --  171 176  --   --   --   --  185  --  291 396   NEUTROS ABS 7.90*  --   --   --  14.09* 19.33*  --   --   --   --  42.12*  --  34.47* 10.73*   IMMATURE GRANS (ABS) 0.06*  --   --   --  0.11* 0.22*  --   --   --   --  1.50*  --   --  0.02   LYMPHS ABS 1.09  --   --   --  1.00 1.46  --   --   --   --  0.64*  --   --  1.92   MONOS ABS 1.19*  --   --   --  1.20* 1.28*   --   --   --   --  1.67*  --   --  1.04*   EOS ABS 0.24  --   --   --  0.16 0.26  --   --   --   --  0.00  --  0.00 0.18   MCV 90.9 92.0  --   --  92.0 97.8*  --   --   --   --  99.3*  --  97.3* 95.8   PROCALCITONIN  --   --   --   --   --   --   --   --   --   --   --   --  4.88*  --    LACTATE  --   --   --   --   --   --  1.6 2.5* 2.1* 2.2* 2.1*   < > 6.7*  --     < > = values in this interval not displayed.         Lab 04/17/24  0528 04/16/24  0428 04/15/24  0416 04/14/24  0603 04/13/24  2038 04/13/24  1043 04/13/24  0330 04/13/24  0158 04/12/24  1905   SODIUM 135* 135* 136 133*  --   --  140  --  142   POTASSIUM 4.2 4.5 4.5 4.4 4.5 5.7* 4.7  --  3.4*   CHLORIDE 102 103 104 101  --   --  113*  --  105   CO2 19.0* 21.0* 21.0* 20.0*  --   --  16.0*  --  21.0*   ANION GAP 14.0 11.0 11.0 12.0  --   --  11.0  --  16.0*   BUN 53* 54* 49* 39*  --   --  25*  --  22   CREATININE 8.37* 7.03* 6.14* 4.58*  --   --  2.73*  --  2.23*   EGFR 6.1* 7.6* 8.9* 12.6*  --   --  23.5*  --  30.0*   GLUCOSE 98 98 102* 95  --   --  104*  --  117*   CALCIUM 7.9* 8.0* 7.7* 7.7*  --   --  7.9*  --  9.2   MAGNESIUM  --   --  2.5* 2.6*  --   --   --  1.3* 1.8  1.7   PHOSPHORUS  --   --  3.4 2.8  --  2.9  --  1.7*  --    TSH  --   --   --   --   --   --   --  1.150  --          Lab 04/14/24  0603 04/13/24  0330 04/12/24  1905 04/10/24  1355   TOTAL PROTEIN 6.0 5.5* 7.9 9.0*   ALBUMIN 2.5* 2.4* 3.6 4.1   GLOBULIN 3.5 3.1 4.3  --    ALT (SGPT) 9 10 15 19   AST (SGOT) 15 21 33 24   BILIRUBIN <0.2 0.2 0.4 0.2   BILIRUBIN DIRECT  --   --   --  <0.2   ALK PHOS 61 50 81 95   LIPASE  --   --  31  --          Lab 04/13/24  0330 04/13/24  0158 04/12/24  1905   HSTROP T 37* 34* 35*                 Brief Urine Lab Results  (Last result in the past 365 days)        Color   Clarity   Blood   Leuk Est   Nitrite   Protein   CREAT   Urine HCG        04/13/24 1632             38.1                 Microbiology Results Abnormal       Procedure Component Value -  Date/Time    Blood Culture - Blood, Wrist, Left [[id number]]  (Normal) Collected: 04/12/24 2009    Lab Status: Preliminary result Specimen: Blood from Wrist, Left Updated: 04/16/24 2100     Blood Culture No growth at 4 days    Blood Culture - Blood, Arm, Right [[id number]]  (Normal) Collected: 04/12/24 2009    Lab Status: Preliminary result Specimen: Blood from Arm, Right Updated: 04/16/24 2100     Blood Culture No growth at 4 days    Urine Culture - Urine, Straight Cath [[id number]]  (Normal) Collected: 04/12/24 2247    Lab Status: Final result Specimen: Urine from Straight Cath Updated: 04/14/24 1206     Urine Culture No growth    Gastrointestinal Panel, PCR - Stool, Per Rectum [[id number]]  (Normal) Collected: 04/14/24 0709    Lab Status: Final result Specimen: Stool from Per Rectum Updated: 04/14/24 0908     Campylobacter Not Detected     Plesiomonas shigelloides Not Detected     Salmonella Not Detected     Vibrio Not Detected     Vibrio cholerae Not Detected     Yersinia enterocolitica Not Detected     Enteroaggregative E. coli (EAEC) Not Detected     Enteropathogenic E. coli (EPEC) Not Detected     Enterotoxigenic E. coli (ETEC) lt/st Not Detected     Shiga-like toxin-producing E. coli (STEC) stx1/stx2 Not Detected     Shigella/Enteroinvasive E. coli (EIEC) Not Detected     Cryptosporidium Not Detected     Cyclospora cayetanensis Not Detected     Entamoeba histolytica Not Detected     Giardia lamblia Not Detected     Adenovirus F40/41 Not Detected     Astrovirus Not Detected     Norovirus GI/GII Not Detected     Rotavirus A Not Detected     Sapovirus (I, II, IV or V) Not Detected    Clostridioides difficile Toxin - Stool, Per Rectum [[id number]]  (Normal) Collected: 04/14/24 0709    Lab Status: Final result Specimen: Stool from Per Rectum Updated: 04/14/24 0817    Narrative:      The following orders were created for panel order Clostridioides difficile Toxin - Stool, Per Rectum.  Procedure                                Abnormality         Status                     ---------                               -----------         ------                     Clostridioides difficile...[[id number]]  Normal              Final result                 Please view results for these tests on the individual orders.    Clostridioides difficile Toxin, PCR - Stool, Per Rectum [[id number]]  (Normal) Collected: 04/14/24 0709    Lab Status: Final result Specimen: Stool from Per Rectum Updated: 04/14/24 0817     Toxigenic C. difficile by PCR Not Detected    Narrative:      The result indicates the absence of toxigenic C. difficile from stool specimen.     Eosinophil Smear - Urine, Urine, Clean Catch [[id number]]  (Normal) Collected: 04/13/24 1631    Lab Status: Final result Specimen: Urine, Clean Catch Updated: 04/13/24 1732     Eosinophil Smear 0 % EOS/100 Cells     Narrative:      No eosinophil seen    MRSA Screen, PCR (Inpatient) - Swab, Nares [[id number]]  (Normal) Collected: 04/12/24 2128    Lab Status: Final result Specimen: Swab from Nares Updated: 04/13/24 0803     MRSA PCR Negative    Narrative:      The negative predictive value of this diagnostic test is high and should only be used to consider de-escalating anti-MRSA therapy. A positive result may indicate colonization with MRSA and must be correlated clinically.  MRSA Negative    COVID PRE-OP / PRE-PROCEDURE SCREENING ORDER (NO ISOLATION) - Swab, Nasopharynx [[id number]]  (Normal) Collected: 04/12/24 2010    Lab Status: Final result Specimen: Swab from Nasopharynx Updated: 04/12/24 2121    Narrative:      The following orders were created for panel order COVID PRE-OP / PRE-PROCEDURE SCREENING ORDER (NO ISOLATION) - Swab, Nasopharynx.  Procedure                               Abnormality         Status                     ---------                               -----------         ------                     Respiratory Panel PCR w/...[[id number]]  Normal              Final result                  Please view results for these tests on the individual orders.    Respiratory Panel PCR w/COVID-19(SARS-CoV-2) [facility] In-House, NP Swab in UTM/VTM, 2 HR TAT - Swab, Nasopharynx [[id number]]  (Normal) Collected: 04/12/24 2010    Lab Status: Final result Specimen: Swab from Nasopharynx Updated: 04/12/24 2121     ADENOVIRUS, PCR Not Detected     Coronavirus 229E Not Detected     Coronavirus HKU1 Not Detected     Coronavirus NL63 Not Detected     Coronavirus OC43 Not Detected     COVID19 Not Detected     Human Metapneumovirus Not Detected     Human Rhinovirus/Enterovirus Not Detected     Influenza A PCR Not Detected     Influenza B PCR Not Detected     Parainfluenza Virus 1 Not Detected     Parainfluenza Virus 2 Not Detected     Parainfluenza Virus 3 Not Detected     Parainfluenza Virus 4 Not Detected     RSV, PCR Not Detected     Bordetella pertussis pcr Not Detected     Bordetella parapertussis PCR Not Detected     Chlamydophila pneumoniae PCR Not Detected     Mycoplasma pneumo by PCR Not Detected    Narrative:      In the setting of a positive respiratory panel with a viral infection PLUS a negative procalcitonin without other underlying concern for bacterial infection, consider observing off antibiotics or discontinuation of antibiotics and continue supportive care. If the respiratory panel is positive for atypical bacterial infection (Bordetella pertussis, Chlamydophila pneumoniae, or Mycoplasma pneumoniae), consider antibiotic de-escalation to target atypical bacterial infection.            No radiology results from the last 24 hrs    Results for orders placed during the hospital encounter of 07/09/21    Adult Transthoracic Echo Complete W/ Cont if Necessary Per Protocol    Interpretation Summary  · Estimated left ventricular EF = 55% Left ventricular systolic function is normal.  · Left ventricular diastolic function was normal.  · Left ventricular wall thickness is consistent with mild  concentric hypertrophy.  · Trace mitral and tricuspid regurgitation.      Current medications:  Scheduled Meds:budesonide-formoterol, 2 puff, Inhalation, BID - RT   And  tiotropium bromide monohydrate, 2 puff, Inhalation, Daily - RT  cefepime, 2,000 mg, Intravenous, Daily  heparin (porcine), 5,000 Units, Subcutaneous, Q8H  nicotine, 1 patch, Transdermal, Q24H  [Held by provider] pantoprazole, 40 mg, Oral, Q AM  pantoprazole, 40 mg, Intravenous, Q12H  pravastatin, 80 mg, Oral, Nightly  sodium chloride, 10 mL, Intravenous, Q12H  tamsulosin, 0.4 mg, Oral, Daily      Continuous Infusions:     PRN Meds:.  acetaminophen **OR** acetaminophen **OR** acetaminophen    Calcium Replacement - Follow Nurse / BPA Driven Protocol    hydrALAZINE    HYDROcodone-acetaminophen    Magnesium Standard Dose Replacement - Follow Nurse / BPA Driven Protocol    nicotine polacrilex    ondansetron    ondansetron ODT **OR** ondansetron    prochlorperazine    sodium chloride    sodium chloride    sodium chloride    Assessment & Plan   Assessment & Plan     Active Hospital Problems    Diagnosis  POA    **Sepsis [A41.9]  Yes    Severe malnutrition [E43]  Yes    ARACELI (acute kidney injury) [N17.9]  Unknown    Hypokalemia [E87.6]  Unknown    Acute pyelonephritis [N10]  Unknown    Primary hypertension [I10]  Yes    Centrilobular emphysema [J43.2]  Yes    Tobacco abuse [Z72.0]  Yes    Presence of cardiac pacemaker [Z95.0]  Yes    Mixed hyperlipidemia [E78.2]  Yes      Resolved Hospital Problems   No resolved problems to display.        Brief Hospital Course to date:  David Barfield is a 74 y.o. male with a PMH significant for non-small cell lung cancer s/p neoadjuvant chemoimmunotherapy and RLL lobectomy (1/2024) currently on Opdivo (last tx 4/4/24), tobacco use disorder, HTN, emphysema who resented to the ER due to nausea, vomiting, diarrhea.  Found to have severe sepsis secondary to acute pyelonephritis in immunosuppressed patient.    Severe sepsis  - POA w/tachycardia, lactic acidosis, elevated procal, leukocytosis, hypotension  Acute pyelonephritis  Immunosuppressed host  - Continue cefepime, ID following  - C. difficile and GI panel negative  -- Blood cultures NGTD; urine culture negative, but imaging and presentation consistent with pyelonephritis, continue abx per ID  - Lactic acid normalized with IVF     Olguric ARACELI, POA - worsening  --Baseline creatinine 0.94-1.1, continues to worsen, now >8  --patient reports normal UOP  --Failed Lasix trial on 4/14  --Nephrology following, initiation of HD per Dr. Gomez, ?need for renal biopsy, currently patient does not want to proceed with it    Possible GI bleed  Chronic Anemia  --Hemoglobin stable, was given a lot of IVF, likely dilutional component  --PPI on hold due to worsening renal fxn  --transfuse PRN Hgb <7 or symptomatic anemia     Non-small cell lung cancer  - Follows with Dr. Gely Ashley  - Last treatment with Opdivo 4/4/2024     Hypomagnesemia - resolved  - Replace per protocol    Hyperkalemia - resolved  --S/p lokelma      Hypertension  - Hold home lisinopril due to hypotension and ARACELI  - start norvasc    Expected Discharge Location and Transportation: TBD  Expected Discharge   Expected Discharge Date: 4/17/2024; Expected Discharge Time:      DVT prophylaxis:  Medical DVT prophylaxis orders are present.         AM-PAC 6 Clicks Score (PT): 19 (04/16/24 1604)    CODE STATUS:   Code Status and Medical Interventions:   Ordered at: 04/13/24 0017     Level Of Support Discussed With:    Patient     Code Status (Patient has no pulse and is not breathing):    CPR (Attempt to Resuscitate)     Medical Interventions (Patient has pulse or is breathing):    Full Support       Dionne Samaniego, DO  04/17/24

## 2024-04-17 NOTE — PROGRESS NOTES
"   LOS: 5 days    Patient Care Team:  Shahid Drake MD as PCP - General (Family Medicine)  Octaviano Sampson MD as Consulting Physician (Pulmonary Disease)  Neetu Ashley MD as Referring Physician (Hematology and Oncology)  Nimo Rodríguez MD as Consulting Physician (Radiation Oncology)    Subjective   Chart reviewed.  Increase in creatinine is noted.  Nausea earlier today no significant diarrhea.  Poor oral intake.    Objective     Vital Signs:  Blood pressure 169/83, pulse 80, temperature 98.1 °F (36.7 °C), temperature source Oral, resp. rate 16, height 182.9 cm (72\"), weight 78 kg (172 lb), SpO2 98%.      Intake/Output Summary (Last 24 hours) at 4/17/2024 1601  Last data filed at 4/17/2024 1515  Gross per 24 hour   Intake --   Output 3750 ml   Net -3750 ml        04/16 0701 - 04/17 0700  In: 1000 [P.O.:1000]  Out: 2240 [Urine:2240]    Physical Exam:  General Appearance: Alert, oriented, no obvious distress.  Oral mucosa dry.  Eyes: PER, EOMI.  Neck: Supple no JVD.  Lungs: Clear auscultation, no rales rhonchi's, equal chest movement, nonlabored.  Heart: No gallop, murmur, rub, RRR.  Abdomen: Soft, nontender, positive bowel sounds, no organomegaly.  Extremities: No edema, no cyanosis.  Neuro: No focal deficit, moving all extremities, alert oriented X 3  Skin is warm and dry: Tenting    Labs:  Results from last 7 days   Lab Units 04/17/24  0528 04/16/24  0428 04/15/24  1526 04/15/24  1137 04/15/24  0416   WBC 10*3/mm3 10.50 11.91*  --   --  16.58*   HEMOGLOBIN g/dL 8.4* 8.0* 8.4*   < > 8.1*   PLATELETS 10*3/mm3 185 179  --   --  171    < > = values in this interval not displayed.     Results from last 7 days   Lab Units 04/17/24  0528 04/16/24  0428 04/15/24  0416 04/14/24  0603 04/13/24  2038 04/13/24  1043 04/13/24  0330 04/13/24  0158 04/12/24  1905   SODIUM mmol/L 135* 135* 136 133*  --   --  140  --  142   POTASSIUM mmol/L 4.2 4.5 4.5 4.4   < > 5.7* 4.7  --  3.4*   CHLORIDE mmol/L 102 103 104 101  --   --  " 113*  --  105   CO2 mmol/L 19.0* 21.0* 21.0* 20.0*  --   --  16.0*  --  21.0*   BUN mg/dL 53* 54* 49* 39*  --   --  25*  --  22   CREATININE mg/dL 8.37* 7.03* 6.14* 4.58*  --   --  2.73*  --  2.23*   CALCIUM mg/dL 7.9* 8.0* 7.7* 7.7*  --   --  7.9*  --  9.2   PHOSPHORUS mg/dL  --   --  3.4 2.8  --  2.9  --  1.7*  --    MAGNESIUM mg/dL  --   --  2.5* 2.6*  --   --   --  1.3* 1.8  1.7   ALBUMIN g/dL  --   --   --  2.5*  --   --  2.4*  --  3.6    < > = values in this interval not displayed.     Results from last 7 days   Lab Units 04/14/24  0603   ALK PHOS U/L 61   BILIRUBIN mg/dL <0.2   ALT (SGPT) U/L 9   AST (SGOT) U/L 15          Estimated Creatinine Clearance: 8.4 mL/min (A) (by C-G formula based on SCr of 8.37 mg/dL (H)).         A/P:    1.  ARF: Creatinine continues to trend up with stable BUN.  Urine output remains nonoliguric and increasing.  Baseline cr ~ 0.8mg/dl. Cr on this admission 2.2-2.7mg/dl. UA large blood, trace protein, large aspen esterase many wbc++. CT showed perinephric fat stranding consistent with pyelonephritis. Urine na 74 urine cl 16.  Patient likely with tubulointerstitial inflammation in the setting of infection with component of ATN due to hemodynamic instability on ACEI I with nausea, vomiting and poor po intake.. 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  Urine eos negative.  Doubt reaction due to antibiotics.      Urine output increased with decreased oral intake oral mucosa is dry.  Patient seems to be volume depleted will give IV fluid today.    Further decision regards to kidney biopsy will be done tomorrow if the renal function continue to titrate or the urine output decreases.    2.  Hypertension: Patient initially septic with pyelonephritis.  Improved with volume resuscitation on IVF.  Continues off all blood pressure medications..  RAAS suppression on hold with ARF.  Cover with as needed medications for now.    3.  Hyponatremia: Improving overnight.   Monitor for now.  All fluids isotonic.    4.  Hyperkalemia: Resolved.  Potassium initially up to 5.7 improved to normal range post Lokelma.  RAAS suppression on hold..    5.  Metabolic acidosis: Stable.  Bicarb initially quite low at 16 with an underlying lactic acidemia due to poor perfusion.  Bicarb improved with perfusion support.  Would monitor for now.    6.  Anemia: Hemoglobin below goal.  Transfuse as indicated for Hgb <7.  Patient with possible GI bleed.  Recent black stools.  Workup underway.    7.  Volume urine output increasing.  For now continue strict I's and O's.    8.  Small cell lung CA: Last treatment with Opdivo 4/4/2024.  Follows with Dr. Gely Ashley.    High risk complex patient multiple medical problems.    Alok Dixon MD  04/17/24  16:01 EDT

## 2024-04-18 LAB
ALBUMIN SERPL-MCNC: 2.4 G/DL (ref 3.5–5.2)
ANION GAP SERPL CALCULATED.3IONS-SCNC: 13 MMOL/L (ref 5–15)
BASOPHILS # BLD AUTO: 0.02 10*3/MM3 (ref 0–0.2)
BASOPHILS NFR BLD AUTO: 0.2 % (ref 0–1.5)
BUN SERPL-MCNC: 54 MG/DL (ref 8–23)
BUN/CREAT SERPL: 6.4 (ref 7–25)
CALCIUM SPEC-SCNC: 8.2 MG/DL (ref 8.6–10.5)
CHLORIDE SERPL-SCNC: 105 MMOL/L (ref 98–107)
CO2 SERPL-SCNC: 19 MMOL/L (ref 22–29)
CREAT SERPL-MCNC: 8.45 MG/DL (ref 0.76–1.27)
DEPRECATED RDW RBC AUTO: 50.4 FL (ref 37–54)
EGFRCR SERPLBLD CKD-EPI 2021: 6.1 ML/MIN/1.73
EOSINOPHIL # BLD AUTO: 0.23 10*3/MM3 (ref 0–0.4)
EOSINOPHIL NFR BLD AUTO: 2.2 % (ref 0.3–6.2)
ERYTHROCYTE [DISTWIDTH] IN BLOOD BY AUTOMATED COUNT: 14.9 % (ref 12.3–15.4)
GLUCOSE SERPL-MCNC: 105 MG/DL (ref 65–99)
HCT VFR BLD AUTO: 23.2 % (ref 37.5–51)
HGB BLD-MCNC: 7.7 G/DL (ref 13–17.7)
IMM GRANULOCYTES # BLD AUTO: 0.08 10*3/MM3 (ref 0–0.05)
IMM GRANULOCYTES NFR BLD AUTO: 0.8 % (ref 0–0.5)
LYMPHOCYTES # BLD AUTO: 1.31 10*3/MM3 (ref 0.7–3.1)
LYMPHOCYTES NFR BLD AUTO: 12.5 % (ref 19.6–45.3)
MCH RBC QN AUTO: 30.3 PG (ref 26.6–33)
MCHC RBC AUTO-ENTMCNC: 33.2 G/DL (ref 31.5–35.7)
MCV RBC AUTO: 91.3 FL (ref 79–97)
MONOCYTES # BLD AUTO: 1.28 10*3/MM3 (ref 0.1–0.9)
MONOCYTES NFR BLD AUTO: 12.3 % (ref 5–12)
NEUTROPHILS NFR BLD AUTO: 7.52 10*3/MM3 (ref 1.7–7)
NEUTROPHILS NFR BLD AUTO: 72 % (ref 42.7–76)
NRBC BLD AUTO-RTO: 0 /100 WBC (ref 0–0.2)
PHOSPHATE SERPL-MCNC: 4.5 MG/DL (ref 2.5–4.5)
PLATELET # BLD AUTO: 181 10*3/MM3 (ref 140–450)
PMV BLD AUTO: 10.3 FL (ref 6–12)
POTASSIUM SERPL-SCNC: 4 MMOL/L (ref 3.5–5.2)
RBC # BLD AUTO: 2.54 10*6/MM3 (ref 4.14–5.8)
SODIUM SERPL-SCNC: 137 MMOL/L (ref 136–145)
WBC NRBC COR # BLD AUTO: 10.44 10*3/MM3 (ref 3.4–10.8)

## 2024-04-18 PROCEDURE — 94664 DEMO&/EVAL PT USE INHALER: CPT

## 2024-04-18 PROCEDURE — 80069 RENAL FUNCTION PANEL: CPT | Performed by: INTERNAL MEDICINE

## 2024-04-18 PROCEDURE — 99232 SBSQ HOSP IP/OBS MODERATE 35: CPT | Performed by: INTERNAL MEDICINE

## 2024-04-18 PROCEDURE — 25010000002 HEPARIN (PORCINE) PER 1000 UNITS: Performed by: INTERNAL MEDICINE

## 2024-04-18 PROCEDURE — 85025 COMPLETE CBC W/AUTO DIFF WBC: CPT | Performed by: INTERNAL MEDICINE

## 2024-04-18 PROCEDURE — 94799 UNLISTED PULMONARY SVC/PX: CPT

## 2024-04-18 PROCEDURE — 25010000002 SODIUM CHLORIDE 0.9 % WITH KCL 20 MEQ 20-0.9 MEQ/L-% SOLUTION: Performed by: INTERNAL MEDICINE

## 2024-04-18 PROCEDURE — 25010000002 CEFEPIME PER 500 MG: Performed by: INTERNAL MEDICINE

## 2024-04-18 RX ADMIN — PANTOPRAZOLE SODIUM 40 MG: 40 INJECTION, POWDER, FOR SOLUTION INTRAVENOUS at 20:42

## 2024-04-18 RX ADMIN — HEPARIN SODIUM 5000 UNITS: 5000 INJECTION INTRAVENOUS; SUBCUTANEOUS at 20:42

## 2024-04-18 RX ADMIN — POTASSIUM CHLORIDE AND SODIUM CHLORIDE 125 ML/HR: 900; 150 INJECTION, SOLUTION INTRAVENOUS at 09:04

## 2024-04-18 RX ADMIN — TAMSULOSIN HYDROCHLORIDE 0.4 MG: 0.4 CAPSULE ORAL at 08:30

## 2024-04-18 RX ADMIN — AMLODIPINE BESYLATE 5 MG: 5 TABLET ORAL at 08:30

## 2024-04-18 RX ADMIN — PANTOPRAZOLE SODIUM 40 MG: 40 INJECTION, POWDER, FOR SOLUTION INTRAVENOUS at 08:30

## 2024-04-18 RX ADMIN — BUDESONIDE AND FORMOTEROL FUMARATE DIHYDRATE 2 PUFF: 160; 4.5 AEROSOL RESPIRATORY (INHALATION) at 08:36

## 2024-04-18 RX ADMIN — TIOTROPIUM BROMIDE INHALATION SPRAY 2 PUFF: 3.12 SPRAY, METERED RESPIRATORY (INHALATION) at 08:36

## 2024-04-18 RX ADMIN — BUDESONIDE AND FORMOTEROL FUMARATE DIHYDRATE 2 PUFF: 160; 4.5 AEROSOL RESPIRATORY (INHALATION) at 21:10

## 2024-04-18 RX ADMIN — POTASSIUM CHLORIDE AND SODIUM CHLORIDE 125 ML/HR: 900; 150 INJECTION, SOLUTION INTRAVENOUS at 17:12

## 2024-04-18 RX ADMIN — Medication 10 ML: at 08:33

## 2024-04-18 RX ADMIN — PRAVASTATIN SODIUM 80 MG: 40 TABLET ORAL at 20:42

## 2024-04-18 RX ADMIN — HEPARIN SODIUM 5000 UNITS: 5000 INJECTION INTRAVENOUS; SUBCUTANEOUS at 14:23

## 2024-04-18 RX ADMIN — CEFEPIME 2000 MG: 2 INJECTION, POWDER, FOR SOLUTION INTRAVENOUS at 08:30

## 2024-04-18 NOTE — PROGRESS NOTES
Clinical Nutrition   Nutrition Support Assessment  Reason for Visit: Follow-up protocol    Patient Name: David Barfield  YOB: 1950  MRN: [id number]  Date of Encounter: 04/18/24 14:41 EDT  Admission date: 4/12/2024    Comments:     Visited with patient and wife at bedside. Both endorsed patient's appetite has been increasing as of late. Requested to modify current ONS to boost glucose and add fruit juice TID. RDN following as able.    Wife had questions regarding current IV/meds regimen - advised patient and spouse to consult with providers and nursing for clarification as it is out of this writer's scope.    Nutrition Assessment   Admission Diagnosis:  Sepsis [A41.9]    Problem List:    Sepsis    Presence of cardiac pacemaker    Mixed hyperlipidemia    Centrilobular emphysema    Tobacco abuse    Primary hypertension    Acute pyelonephritis    ARACELI (acute kidney injury)    Hypokalemia    Severe malnutrition    PMH:   He  has a past medical history of Abnormal ECG, Arrhythmia (2019), Asthma (2019), Bronchogenic cancer of right lung (10/04/2023), Diabetes mellitus (Borderline), Emphysema/COPD, Erectile disorder, GERD (gastroesophageal reflux disease), History of chemotherapy, Hyperlipidemia, Hypertension (2019), Lung nodule, Mumps, Mumps, Pruritus, Slow to wake up after anesthesia, Wears dentures, and Wears hearing aid in both ears.    PSH:  He  has a past surgical history that includes Bone biopsy; Facial fracture surgery; Cardiac electrophysiology procedure (N/A, 08/17/2021); BRONCHOSCOPY WITH ION ROBOTIC ASSIST (N/A, 09/15/2023); Pacemaker Implantation; and bronchoscopy thoracotomy (Right, 1/9/2024).    Applicable Nutrition Concerns:   Skin:  Oral:  GI:    Applicable Interval History:     Reported/Observed/Food/Nutrition Related History:   4/18  Visited with patient and wife at bedside. Both endorsed patient's appetite has been increasing as of late. Requested to modify current ONS  "to boost glucose and add fruit juice TID.    4/14  Pt/family confirm current wt. Pt allows < 1/2 usual intake > 1 mo.w effect tx.   Anthropometrics     Height: Height: 182.9 cm (72\")  Last Filed Weight: Weight: 85.6 kg (188 lb 12.8 oz) (04/18/24 0500)  Method: Weight Method: Bed scale confirmed  BMI: BMI (Calculated): 25.6    UBW:  Per EMR wt of 192 lbs on 11/30/23  Weight change:  Loss of 20 lbs 10% body wt over 4 1/2 mo.     Nutrition Focused Physical Exam     Date:   4/14      Patient meets criteria for malnutrition diagnosis, see MSA note.    Current Nutrition Prescription   PO: Diet: Renal; Low Potassium, Low Sodium (2-3g); Fluid Consistency: Thin (IDDSI 0)  Oral Nutrition Supplement:   Intake:  4/16: B 25, L 50% PO intake documented    Nutrition Diagnosis   Date:  4/14            Updated:    Problem Malnutrition severe chronic   Etiology Effect dx/tx    Signs/Symptoms Wt Intake hx w wasting   Status:     Goal:   Nutrition to support treatment and Continue positive trend    Nutrition Intervention      Follow treatment progress, Care plan reviewed, Interview for preferences, Encourage intake, Supplement provided    Monitoring/Evaluation:   Per protocol, PO intake, Pertinent labs, Weight, Symptoms, POC/GOC    Siena Colón RD  Time Spent: 30min  "

## 2024-04-18 NOTE — PROGRESS NOTES
"   LOS: 6 days    Patient Care Team:  Shahid Drake MD as PCP - General (Family Medicine)  Octaviano Sampson MD as Consulting Physician (Pulmonary Disease)  Neetu Ashley MD as Referring Physician (Hematology and Oncology)  Nimo Rodríguez MD as Consulting Physician (Radiation Oncology)    Subjective   Chart reviewed.  Good urine output.  Creatinine remains unchanged.  Denies any nausea vomiting chest pain or shortness of breath.  Feeling much better.    Objective     Vital Signs:  Blood pressure 145/89, pulse 73, temperature 97.8 °F (36.6 °C), temperature source Oral, resp. rate 18, height 182.9 cm (72\"), weight 85.6 kg (188 lb 12.8 oz), SpO2 98%.      Intake/Output Summary (Last 24 hours) at 4/18/2024 1433  Last data filed at 4/18/2024 0810  Gross per 24 hour   Intake --   Output 3850 ml   Net -3850 ml        04/17 0701 - 04/18 0700  In: -   Out: 4250 [Urine:4250]    Physical Exam:  General Appearance: Awake, alert, oriented x 4 no obvious distress.  Oral: Moist  Eyes: PER, EOMI.  Neck: Supple no JVD.  Lungs: Clear auscultation, no rales rhonchi's, equal chest movement, nonlabored.  Heart: No gallop, murmur, rub, RRR.  Abdomen: Soft, nontender, positive bowel sounds, no organomegaly.  Extremities: No edema, no cyanosis.  Neuro: No focal deficit, moving all extremities, alert oriented X 3  Skin: Warm and dry.    Labs:  Results from last 7 days   Lab Units 04/18/24  0333 04/17/24  0528 04/16/24  0428   WBC 10*3/mm3 10.44 10.50 11.91*   HEMOGLOBIN g/dL 7.7* 8.4* 8.0*   PLATELETS 10*3/mm3 181 185 179     Results from last 7 days   Lab Units 04/18/24  0333 04/17/24  0528 04/16/24  0428 04/15/24  0416 04/14/24  0603 04/13/24  2038 04/13/24  1043 04/13/24  0330 04/13/24  0158 04/12/24  1905 04/12/24  1905   SODIUM mmol/L 137 135* 135* 136 133*  --   --  140  --   --  142   POTASSIUM mmol/L 4.0 4.2 4.5 4.5 4.4   < > 5.7* 4.7  --   --  3.4*   CHLORIDE mmol/L 105 102 103 104 101  --   --  113*  --   --  105   CO2 " mmol/L 19.0* 19.0* 21.0* 21.0* 20.0*  --   --  16.0*  --   --  21.0*   BUN mg/dL 54* 53* 54* 49* 39*  --   --  25*  --   --  22   CREATININE mg/dL 8.45* 8.37* 7.03* 6.14* 4.58*  --   --  2.73*  --   --  2.23*   CALCIUM mg/dL 8.2* 7.9* 8.0* 7.7* 7.7*  --   --  7.9*  --   --  9.2   PHOSPHORUS mg/dL 4.5  --   --  3.4 2.8  --  2.9  --  1.7*   < >  --    MAGNESIUM mg/dL  --   --   --  2.5* 2.6*  --   --   --  1.3*  --  1.8  1.7   ALBUMIN g/dL 2.4*  --   --   --  2.5*  --   --  2.4*  --   --  3.6    < > = values in this interval not displayed.     Results from last 7 days   Lab Units 04/14/24  0603   ALK PHOS U/L 61   BILIRUBIN mg/dL <0.2   ALT (SGPT) U/L 9   AST (SGOT) U/L 15          Estimated Creatinine Clearance: 9.1 mL/min (A) (by C-G formula based on SCr of 8.45 mg/dL (H)).         A/P:    1.  ARF: Creatinine continues to trend up with stable BUN.  Urine output remains nonoliguric and increasing.  Baseline cr ~ 0.8mg/dl. Cr on this admission 2.2-2.7mg/dl. UA large blood, trace protein, large aspen esterase many wbc++. CT showed perinephric fat stranding consistent with pyelonephritis. Urine na 74 urine cl 16.  Patient likely with tubulointerstitial inflammation in the setting of infection with component of ATN due to hemodynamic instability on ACEI I with nausea, vomiting and poor po intake.. 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  Urine eos negative.  Doubt reaction due to antibiotics.      Urine output increased with decreased oral intake oral mucosa is dry.  Patient seems to be volume depleted will give IV fluid today.    Further decision regards to kidney biopsy will be done tomorrow if the renal function continue to titrate or the urine output decreases.    2.  2.  Hypertension: Hypertension: Patient initially septic with pyelonephritis.  Improved with volume resuscitation on IVF.  Continues off all blood pressure medications..  RAAS suppression on hold with ARF.  Cover with as needed  medications for now.    3.  Hyponatremia: Improving overnight.  Monitor for now.  All fluids isotonic.    4.  Hyperkalemia: Resolved.  Potassium initially up to 5.7 improved to normal range post Lokelma.  RAAS suppression on hold..    5.  Metabolic acidosis: Stable.  Bicarb initially quite low at 16 with an underlying lactic acidemia due to poor perfusion.  Bicarb improved with perfusion support.  Would monitor for now.    6.  Anemia: Hemoglobin below goal.  Transfuse as indicated for Hgb <7.  Patient with possible GI bleed.  Recent black stools.  Workup underway.    7.  Volume: Urine output increasing.  For now continue strict I's and O's.    8.  Small cell lung CA: Last treatment with Opdivo 4/4/2024.  Follows with Dr. Gely Ashley.    High risk complex patient multiple medical problems.  Risks of kidney biopsy discussed.  We will hold the biopsy for now since he has good urine output and has been asymptomatic.    Alok Dixon MD  04/18/24  14:33 EDT

## 2024-04-18 NOTE — PROGRESS NOTES
[facility] Medicine Services  PROGRESS NOTE    Patient Name: David Barfield  : 1950  MRN: [id number]    Date of Admission: 2024  Primary Care Physician: Shahid Drake MD    Subjective   Subjective     CC:  Follow-up sepsis    HPI:  Patient resting in bed. No issues overnight. Still with good UOP    Objective   Objective     Vital Signs:   Temp:  [97.6 °F (36.4 °C)-98.1 °F (36.7 °C)] 97.6 °F (36.4 °C)  Heart Rate:  [70-81] 75  Resp:  [16-20] 18  BP: (136-169)/(68-83) 149/71     Physical Exam:  Constitutional: No acute distress, awake, alert  HENT: NCAT, mucous membranes moist  Respiratory: Clear to auscultation bilaterally, respiratory effort normal   Cardiovascular: RRR, no murmurs, rubs, or gallops  Gastrointestinal: soft, nontender, nondistended  Musculoskeletal: No bilateral ankle edema  Psychiatric: Appropriate affect, cooperative  Neurologic: Oriented x 3, speech clear, no focal deficits  Skin: No rashes    No changes         Results Reviewed:  LAB RESULTS:      Lab 24  0333 24  0528 24  0428 04/15/24  1526 04/15/24  1137 04/15/24  0416 24  0603 24  2038 24  1611 24  0857 24  0330 24  0158 24  2311 24  1905   WBC 10.44 10.50 11.91*  --   --  16.58* 22.61*  --   --   --   --  46.05*  --  36.28*   HEMOGLOBIN 7.7* 8.4* 8.0* 8.4* 9.2* 8.1* 8.7*  --   --   --   --  9.0*  --  12.4*   HEMATOCRIT 23.2* 25.0* 24.1* 25.1* 29.2* 24.3* 27.2*  --   --   --   --  28.9*  --  40.0   PLATELETS 181 185 179  --   --  171 176  --   --   --   --  185  --  291   NEUTROS ABS 7.52* 7.90*  --   --   --  14.09* 19.33*  --   --   --   --  42.12*  --  34.47*   IMMATURE GRANS (ABS) 0.08* 0.06*  --   --   --  0.11* 0.22*  --   --   --   --  1.50*  --   --    LYMPHS ABS 1.31 1.09  --   --   --  1.00 1.46  --   --   --   --  0.64*  --   --    MONOS ABS 1.28* 1.19*  --   --   --  1.20* 1.28*  --   --   --   --  1.67*  --   --     EOS ABS 0.23 0.24  --   --   --  0.16 0.26  --   --   --   --  0.00  --  0.00   MCV 91.3 90.9 92.0  --   --  92.0 97.8*  --   --   --   --  99.3*  --  97.3*   PROCALCITONIN  --   --   --   --   --   --   --   --   --   --   --   --   --  4.88*   LACTATE  --   --   --   --   --   --   --  1.6 2.5* 2.1* 2.2* 2.1*   < > 6.7*    < > = values in this interval not displayed.         Lab 04/18/24  0333 04/17/24  0528 04/16/24  0428 04/15/24  0416 04/14/24  0603 04/13/24  2038 04/13/24  1043 04/13/24  0330 04/13/24  0158 04/12/24  1905   SODIUM 137 135* 135* 136 133*  --   --    < >  --  142   POTASSIUM 4.0 4.2 4.5 4.5 4.4   < > 5.7*   < >  --  3.4*   CHLORIDE 105 102 103 104 101  --   --    < >  --  105   CO2 19.0* 19.0* 21.0* 21.0* 20.0*  --   --    < >  --  21.0*   ANION GAP 13.0 14.0 11.0 11.0 12.0  --   --    < >  --  16.0*   BUN 54* 53* 54* 49* 39*  --   --    < >  --  22   CREATININE 8.45* 8.37* 7.03* 6.14* 4.58*  --   --    < >  --  2.23*   EGFR 6.1* 6.1* 7.6* 8.9* 12.6*  --   --    < >  --  30.0*   GLUCOSE 105* 98 98 102* 95  --   --    < >  --  117*   CALCIUM 8.2* 7.9* 8.0* 7.7* 7.7*  --   --    < >  --  9.2   MAGNESIUM  --   --   --  2.5* 2.6*  --   --   --  1.3* 1.8  1.7   PHOSPHORUS 4.5  --   --  3.4 2.8  --  2.9  --  1.7*  --    TSH  --   --   --   --   --   --   --   --  1.150  --     < > = values in this interval not displayed.         Lab 04/18/24  0333 04/14/24  0603 04/13/24  0330 04/12/24  1905   TOTAL PROTEIN  --  6.0 5.5* 7.9   ALBUMIN 2.4* 2.5* 2.4* 3.6   GLOBULIN  --  3.5 3.1 4.3   ALT (SGPT)  --  9 10 15   AST (SGOT)  --  15 21 33   BILIRUBIN  --  <0.2 0.2 0.4   ALK PHOS  --  61 50 81   LIPASE  --   --   --  31         Lab 04/13/24  0330 04/13/24  0158 04/12/24  1905   HSTROP T 37* 34* 35*                 Brief Urine Lab Results  (Last result in the past 365 days)        Color   Clarity   Blood   Leuk Est   Nitrite   Protein   CREAT   Urine HCG        04/13/24 1632             38.1                  Microbiology Results Abnormal       Procedure Component Value - Date/Time    Blood Culture - Blood, Wrist, Left [[id number]]  (Normal) Collected: 04/12/24 2009    Lab Status: Final result Specimen: Blood from Wrist, Left Updated: 04/17/24 2100     Blood Culture No growth at 5 days    Blood Culture - Blood, Arm, Right [[id number]]  (Normal) Collected: 04/12/24 2009    Lab Status: Final result Specimen: Blood from Arm, Right Updated: 04/17/24 2100     Blood Culture No growth at 5 days    Urine Culture - Urine, Straight Cath [[id number]]  (Normal) Collected: 04/12/24 2247    Lab Status: Final result Specimen: Urine from Straight Cath Updated: 04/14/24 1206     Urine Culture No growth    Gastrointestinal Panel, PCR - Stool, Per Rectum [[id number]]  (Normal) Collected: 04/14/24 0709    Lab Status: Final result Specimen: Stool from Per Rectum Updated: 04/14/24 0908     Campylobacter Not Detected     Plesiomonas shigelloides Not Detected     Salmonella Not Detected     Vibrio Not Detected     Vibrio cholerae Not Detected     Yersinia enterocolitica Not Detected     Enteroaggregative E. coli (EAEC) Not Detected     Enteropathogenic E. coli (EPEC) Not Detected     Enterotoxigenic E. coli (ETEC) lt/st Not Detected     Shiga-like toxin-producing E. coli (STEC) stx1/stx2 Not Detected     Shigella/Enteroinvasive E. coli (EIEC) Not Detected     Cryptosporidium Not Detected     Cyclospora cayetanensis Not Detected     Entamoeba histolytica Not Detected     Giardia lamblia Not Detected     Adenovirus F40/41 Not Detected     Astrovirus Not Detected     Norovirus GI/GII Not Detected     Rotavirus A Not Detected     Sapovirus (I, II, IV or V) Not Detected    Clostridioides difficile Toxin - Stool, Per Rectum [[id number]]  (Normal) Collected: 04/14/24 0709    Lab Status: Final result Specimen: Stool from Per Rectum Updated: 04/14/24 0817    Narrative:      The following orders were created for panel order Clostridioides difficile  Toxin - Stool, Per Rectum.  Procedure                               Abnormality         Status                     ---------                               -----------         ------                     Clostridioides difficile...[[id number]]  Normal              Final result                 Please view results for these tests on the individual orders.    Clostridioides difficile Toxin, PCR - Stool, Per Rectum [[id number]]  (Normal) Collected: 04/14/24 0709    Lab Status: Final result Specimen: Stool from Per Rectum Updated: 04/14/24 0817     Toxigenic C. difficile by PCR Not Detected    Narrative:      The result indicates the absence of toxigenic C. difficile from stool specimen.     Eosinophil Smear - Urine, Urine, Clean Catch [[id number]]  (Normal) Collected: 04/13/24 1631    Lab Status: Final result Specimen: Urine, Clean Catch Updated: 04/13/24 1732     Eosinophil Smear 0 % EOS/100 Cells     Narrative:      No eosinophil seen    MRSA Screen, PCR (Inpatient) - Swab, Nares [[id number]]  (Normal) Collected: 04/12/24 2128    Lab Status: Final result Specimen: Swab from Nares Updated: 04/13/24 0803     MRSA PCR Negative    Narrative:      The negative predictive value of this diagnostic test is high and should only be used to consider de-escalating anti-MRSA therapy. A positive result may indicate colonization with MRSA and must be correlated clinically.  MRSA Negative    COVID PRE-OP / PRE-PROCEDURE SCREENING ORDER (NO ISOLATION) - Swab, Nasopharynx [[id number]]  (Normal) Collected: 04/12/24 2010    Lab Status: Final result Specimen: Swab from Nasopharynx Updated: 04/12/24 2121    Narrative:      The following orders were created for panel order COVID PRE-OP / PRE-PROCEDURE SCREENING ORDER (NO ISOLATION) - Swab, Nasopharynx.  Procedure                               Abnormality         Status                     ---------                               -----------         ------                     Respiratory Panel PCR  w/...[[id number]]  Normal              Final result                 Please view results for these tests on the individual orders.    Respiratory Panel PCR w/COVID-19(SARS-CoV-2) [facility] In-House, NP Swab in UTM/VTM, 2 HR TAT - Swab, Nasopharynx [[id number]]  (Normal) Collected: 04/12/24 2010    Lab Status: Final result Specimen: Swab from Nasopharynx Updated: 04/12/24 2121     ADENOVIRUS, PCR Not Detected     Coronavirus 229E Not Detected     Coronavirus HKU1 Not Detected     Coronavirus NL63 Not Detected     Coronavirus OC43 Not Detected     COVID19 Not Detected     Human Metapneumovirus Not Detected     Human Rhinovirus/Enterovirus Not Detected     Influenza A PCR Not Detected     Influenza B PCR Not Detected     Parainfluenza Virus 1 Not Detected     Parainfluenza Virus 2 Not Detected     Parainfluenza Virus 3 Not Detected     Parainfluenza Virus 4 Not Detected     RSV, PCR Not Detected     Bordetella pertussis pcr Not Detected     Bordetella parapertussis PCR Not Detected     Chlamydophila pneumoniae PCR Not Detected     Mycoplasma pneumo by PCR Not Detected    Narrative:      In the setting of a positive respiratory panel with a viral infection PLUS a negative procalcitonin without other underlying concern for bacterial infection, consider observing off antibiotics or discontinuation of antibiotics and continue supportive care. If the respiratory panel is positive for atypical bacterial infection (Bordetella pertussis, Chlamydophila pneumoniae, or Mycoplasma pneumoniae), consider antibiotic de-escalation to target atypical bacterial infection.            No radiology results from the last 24 hrs    Results for orders placed during the hospital encounter of 07/09/21    Adult Transthoracic Echo Complete W/ Cont if Necessary Per Protocol    Interpretation Summary  · Estimated left ventricular EF = 55% Left ventricular systolic function is normal.  · Left ventricular diastolic function was normal.  ·  Left ventricular wall thickness is consistent with mild concentric hypertrophy.  · Trace mitral and tricuspid regurgitation.      Current medications:  Scheduled Meds:amLODIPine, 5 mg, Oral, Q24H  budesonide-formoterol, 2 puff, Inhalation, BID - RT   And  tiotropium bromide monohydrate, 2 puff, Inhalation, Daily - RT  cefepime, 2,000 mg, Intravenous, Daily  heparin (porcine), 5,000 Units, Subcutaneous, Q8H  nicotine, 1 patch, Transdermal, Q24H  [Held by provider] pantoprazole, 40 mg, Oral, Q AM  pantoprazole, 40 mg, Intravenous, Q12H  pravastatin, 80 mg, Oral, Nightly  sodium chloride, 10 mL, Intravenous, Q12H  tamsulosin, 0.4 mg, Oral, Daily      Continuous Infusions:sodium chloride 0.9 % with KCl 20 mEq, 125 mL/hr, Last Rate: 125 mL/hr (04/18/24 0904)        PRN Meds:.  acetaminophen **OR** acetaminophen **OR** acetaminophen    Calcium Replacement - Follow Nurse / BPA Driven Protocol    hydrALAZINE    HYDROcodone-acetaminophen    Magnesium Standard Dose Replacement - Follow Nurse / BPA Driven Protocol    nicotine polacrilex    ondansetron    ondansetron ODT **OR** ondansetron    prochlorperazine    sodium chloride    sodium chloride    sodium chloride    Assessment & Plan   Assessment & Plan     Active Hospital Problems    Diagnosis  POA    **Sepsis [A41.9]  Yes    Severe malnutrition [E43]  Yes    ARACELI (acute kidney injury) [N17.9]  Unknown    Hypokalemia [E87.6]  Unknown    Acute pyelonephritis [N10]  Unknown    Primary hypertension [I10]  Yes    Centrilobular emphysema [J43.2]  Yes    Tobacco abuse [Z72.0]  Yes    Presence of cardiac pacemaker [Z95.0]  Yes    Mixed hyperlipidemia [E78.2]  Yes      Resolved Hospital Problems   No resolved problems to display.        Brief Hospital Course to date:  David Barfield is a 74 y.o. male with a PMH significant for non-small cell lung cancer s/p neoadjuvant chemoimmunotherapy and RLL lobectomy (1/2024) currently on Opdivo (last tx 4/4/24), tobacco use disorder, HTN,  emphysema who resented to the ER due to nausea, vomiting, diarrhea.  Found to have severe sepsis secondary to acute pyelonephritis in immunosuppressed patient.    Severe sepsis - POA w/tachycardia, lactic acidosis, elevated procal, leukocytosis, hypotension  Acute pyelonephritis  Immunosuppressed host  - Continue cefepime, ID following  - C. difficile and GI panel negative  -- Blood cultures NGTD; urine culture negative, but imaging and presentation consistent with pyelonephritis, continue abx per ID  - Lactic acid normalized with IVF     Olguric ARACELI, POA - worsening  --Baseline creatinine 0.94-1.1, continues to worsen, now >8  --still with decent UOP  --Failed Lasix trial on 4/14  --Nephrology following, initiation of HD per Dr. Gomez, ?need for renal biopsy, patient now agreeable    Possible GI bleed  Chronic Anemia  --Hemoglobin stable, given significant IVF, likely dilutional component  --PPI on hold due to worsening renal fxn  --transfuse PRN Hgb <7 or symptomatic anemia     Non-small cell lung cancer  - Follows with Dr. Gely Ashley  - Last treatment with Opdivo 4/4/2024     Hypomagnesemia - resolved  - Replace per protocol    Hyperkalemia - resolved  --S/p lokelma      Hypertension  - Hold home lisinopril due to hypotension and ARACELI  - started norvasc w/improvement    Expected Discharge Location and Transportation: TBD  Expected Discharge   Expected Discharge Date: 4/17/2024; Expected Discharge Time:      DVT prophylaxis:  Medical DVT prophylaxis orders are present.         AM-PAC 6 Clicks Score (PT): 19 (04/17/24 0800)    CODE STATUS:   Code Status and Medical Interventions:   Ordered at: 04/13/24 0017     Level Of Support Discussed With:    Patient     Code Status (Patient has no pulse and is not breathing):    CPR (Attempt to Resuscitate)     Medical Interventions (Patient has pulse or is breathing):    Full Support       Dionne Samaniego, DO  04/18/24

## 2024-04-18 NOTE — PROGRESS NOTES
INFECTIOUS DISEASE Progress note     David Barfield  1950  [id number]        Admission Date: 4/12/2024      Requesting Provider: No Known Provider  Evaluating Physician: Derian Barry MD    Reason for Consultation: sepsis     History of present illness:    David Barfield is a 74 y.o. male, with PMH NSCLC/RLL lobectomy (1/2024) currently on Opdivo, HTN, emphysema, seen today for sepsis. Last Opdivo was last Thursday,  presented to the ED with complaints of  nausea, vomiting, lower back pain, and diarrhea. He has been afebrile. Admitting labs with WBC 36.28, plt 291, LAC 6.7, PCT 4.88, Scr 2.23, ALT 15, AST 33, negative respiratory panel , and UA with TNTC WBCS. CXR with chronic findings.  CT concerning with bilateral pyelonephritis, circumferential wall thickening of the urinary bladder, no abscess, hydronephrosis. Started on Vancomycin , Ertapenem and we were consulted for evaluation and treatment.      4/14/24:  Tmax of 99.1. Blood cultures remain negative to date. One episode of diarrhea this am.  Still with moderate amount of sputum production with cough.  Complains of right sided rib pain with cough.  Wife at bedside. Am labs pending     4/15/24: The patient remains afebrile.  He states he is feeling somewhat better.  No flank pain or abdominal pain today.  Nausea has improved.  Still having some intermittent diarrhea. No dysuria today.  Renal function is worse in his creatinine is over 6. Blood cell count is improving and was down to 16.58 today    4/16/24: The patient remains afebrile.  He is feeling better and is eating better today.  No flank pain.  No nausea or vomiting.  Diarrhea has improved.  Urine output is improving. Creatinine is worse today at 7.03.  White blood cell count is improving to 11.9 today.    4/17/24: The patient does not feel as well today and is more fatigued.  He is not eating quite as well.  Creatinine is increased over 8 today.  He states that his urine output has  increased.  He is not having any fevers.  Leukocytosis improved.  No severe watery diarrhea reported.  No abdominal pain or flank pain.    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    Diabetes mellitus Borderline    Emphysema/COPD     Erectile disorder     GERD (gastroesophageal reflux disease)     History of chemotherapy     Hyperlipidemia     Hypertension 2019    Lung nodule     Mumps     Mumps     Pruritus     after bath    Slow to wake up after anesthesia     Wears dentures     upper only    Wears hearing aid in both ears     usually only wears right       Past Surgical History:   Procedure Laterality Date    BONE BIOPSY      broken bone surgery in his face    BRONCHOSCOPY THORACOTOMY Right 1/9/2024    Procedure: THORACOTOMY FOR LOWER LOBECTOMY AND MEDISTINAL LYMPH NODE DISSECTION RIGHT;  Surgeon: Joey Patel MD;  Location: [facility] OR;  Service: Cardiothoracic;  Laterality: Right;    BRONCHOSCOPY WITH ION ROBOTIC ASSIST N/A 09/15/2023    Procedure: BRONCHOSCOPY NAVIGATION WITH ENDOBRONCHIAL ULTRASOUND AND ION ROBOT;  Surgeon: Octaviano Sampson MD;  Location:  [facility];  Service: Robotics - Pulmonary;  Laterality: N/A;  ion #6 - 0032  - 0015  Cath guide 0061    EBUS balloon removed and intact    CARDIAC ELECTROPHYSIOLOGY PROCEDURE N/A 08/17/2021    Procedure: Pacemaker DC new;  Surgeon: Kayy Box MD;  Location:  [facility];  Service: Cardiology;  Laterality: N/A;    FACIAL FRACTURE SURGERY      PACEMAKER IMPLANTATION         Family History   Problem Relation Age of Onset    Aneurysm Mother         brain    Dementia Father     Leukemia Sister     Heart disease Paternal Grandmother     Hypertension Paternal Grandfather        Social History     Socioeconomic History    Marital status: Significant Other    Number of children: 3   Tobacco Use    Smoking  "status: Former     Current packs/day: 0.50     Average packs/day: 0.5 packs/day for 56.3 years (28.6 ttl pk-yrs)     Types: Cigarettes     Start date: 1/1/1968    Smokeless tobacco: Never    Tobacco comments:     Pt states that he quit smoking on 1/8/24. The day before his sx.    Vaping Use    Vaping status: Never Used   Substance and Sexual Activity    Alcohol use: Never    Drug use: Never    Sexual activity: Defer     Partners: Female     Birth control/protection: None       Allergies   Allergen Reactions    Cymbalta [Duloxetine Hcl] GI Intolerance    Gabapentin Mental Status Change     Pt states that this medication \"makes him feel foolish in his head\".     Remeron [Mirtazapine] Other (See Comments)     Excess sedation    Toradol [Ketorolac Tromethamine] GI Intolerance     Projectile vomiting     Latex Other (See Comments)     Latex allergy     Tape Rash         Medication:    Current Facility-Administered Medications:     acetaminophen (TYLENOL) tablet 650 mg, 650 mg, Oral, Q4H PRN, 650 mg at 04/13/24 2212 **OR** acetaminophen (TYLENOL) 160 MG/5ML oral solution 650 mg, 650 mg, Oral, Q4H PRN **OR** acetaminophen (TYLENOL) suppository 650 mg, 650 mg, Rectal, Q4H PRN, Gladis Maoe G, DO    amLODIPine (NORVASC) tablet 5 mg, 5 mg, Oral, Q24H, Dionne Samaniego, DO, 5 mg at 04/17/24 1222    budesonide-formoterol (SYMBICORT) 160-4.5 MCG/ACT inhaler 2 puff, 2 puff, Inhalation, BID - RT, 2 puff at 04/17/24 2016 **AND** tiotropium (SPIRIVA RESPIMAT) 2.5 mcg/act aerosol solution inhaler, 2 puff, Inhalation, Daily - RT, Rosalia Mao DO, 2 puff at 04/17/24 0948    Calcium Replacement - Follow Nurse / BPA Driven Protocol, , Does not apply, PRN, Daylin Rosalia G, DO    cefepime 2000 mg IVPB in 100 mL NS (MBP), 2,000 mg, Intravenous, Daily, Derian Barry MD, 2,000 mg at 04/17/24 0831    heparin (porcine) 5000 UNIT/ML injection 5,000 Units, 5,000 Units, Subcutaneous, Q8H, Rosalia Mao DO, 5,000 Units at 04/17/24 " 1432    hydrALAZINE (APRESOLINE) injection 10 mg, 10 mg, Intravenous, Q6H PRN, Dionne Samaniego R, DO    HYDROcodone-acetaminophen (NORCO) 7.5-325 MG per tablet 1 tablet, 1 tablet, Oral, Q6H PRN, Daylin, Rosalia G, DO, 1 tablet at 04/13/24 1827    Magnesium Standard Dose Replacement - Follow Nurse / BPA Driven Protocol, , Does not apply, PRN, Daylin, Rosalia G, DO    nicotine (NICODERM CQ) 21 MG/24HR patch 1 patch, 1 patch, Transdermal, Q24H, Daylin, Rosalia G, DO, 1 patch at 04/15/24 0612    nicotine polacrilex (NICORETTE) gum 4 mg, 4 mg, Mouth/Throat, Q1H PRN, Daylin, Rosalia G, DO    ondansetron (ZOFRAN) injection 4 mg, 4 mg, Intravenous, Q6H PRN, Daylin, Rosalia G, DO    ondansetron ODT (ZOFRAN-ODT) disintegrating tablet 4 mg, 4 mg, Oral, Q6H PRN **OR** ondansetron (ZOFRAN) injection 4 mg, 4 mg, Intravenous, Q6H PRN, Daylin, Rosalia G, DO    [Held by provider] pantoprazole (PROTONIX) EC tablet 40 mg, 40 mg, Oral, Q AM, Daylin, Rosalia G, DO, 40 mg at 04/13/24 0532    pantoprazole (PROTONIX) injection 40 mg, 40 mg, Intravenous, Q12H, Amy Hernandez MD, 40 mg at 04/17/24 0831    pravastatin (PRAVACHOL) tablet 80 mg, 80 mg, Oral, Nightly, Daylin, Rosalia G, DO, 80 mg at 04/16/24 2128    prochlorperazine (COMPAZINE) tablet 5 mg, 5 mg, Oral, Q6H PRN, Daylin, Rosalia G, DO    sodium chloride 0.9 % flush 10 mL, 10 mL, Intravenous, PRN, Daylin, Rosalia G, DO, 10 mL at 04/16/24 1051    sodium chloride 0.9 % flush 10 mL, 10 mL, Intravenous, Q12H, Daylin, Rosalia G, DO, 10 mL at 04/17/24 0836    sodium chloride 0.9 % flush 10 mL, 10 mL, Intravenous, PRN, Daylin, Rosalia G, DO    sodium chloride 0.9 % infusion 40 mL, 40 mL, Intravenous, PRN, Daylin, Rosalia G, DO    sodium chloride 0.9 % with KCl 20 mEq/L infusion, 125 mL/hr, Intravenous, Continuous, Alok Dixon MD, Last Rate: 125 mL/hr at 04/17/24 1736, 125 mL/hr at 04/17/24 1736    tamsulosin (FLOMAX) 24 hr capsule 0.4 mg, 0.4 mg, Oral, Daily, Daylin, Rosalia G, DO, 0.4 mg at 04/17/24  0831    Antibiotics:  Anti-Infectives (From admission, onward)      Ordered     Dose/Rate Route Frequency Start Stop    24 1025  cefepime 2000 mg IVPB in 100 mL NS (MBP)        Ordering Provider: Derian Barry MD    2,000 mg  over 4 Hours Intravenous Daily 24 0900 24 0859    24 1025  cefepime 2000 mg IVPB in 100 mL NS (MBP)        Ordering Provider: Derian Barry MD    2,000 mg  over 30 Minutes Intravenous Once 24 1115 24 1301    24 1922  vancomycin IVPB 1750 mg in 0.9% Sodium Chloride (premix) 500 mL        Ordering Provider: Michelle Iyer PA-C    22 mg/kg × 78 kg  285.7 mL/hr over 105 Minutes Intravenous Once 24 2335    24 1922  piperacillin-tazobactam (ZOSYN) 3.375 g IVPB in 100 mL NS MBP (CD)        Ordering Provider: Michelle Iyer PA-C    3.375 g  over 30 Minutes Intravenous Once 24 1930 24 2148                Physical Exam:   Vital Signs  Temp (24hrs), Av °F (36.7 °C), Min:97.7 °F (36.5 °C), Max:98.1 °F (36.7 °C)    Temp  Min: 97.7 °F (36.5 °C)  Max: 98.1 °F (36.7 °C)  BP  Min: 136/68  Max: 175/86  Pulse  Min: 70  Max: 80  Resp  Min: 16  Max: 18  SpO2  Min: 91 %  Max: 98 %    GENERAL: Very frail-appearing.  Resting in bed.  Awake and alert.  HEENT: Normocephalic, atraumatic.  No external oral lesions noted  HEART: RRR; No murmur  LUNGS: CTA B.  Nonlabored breathing on room air  ABDOMEN: Soft, nontender, nondistended. No rebound or guarding.   EXT:  No edema.  No cellulitic change noted  MSK: No joint effusions or erythema. No CVA tenderness bilaterally  SKIN: No new generalized rashes  NEURO: Oriented to PPT.  Normal speech and cognition.  PSYCHIATRIC: Normal insight and judgment. Cooperative with PE    Right PPM site without redness, tenderness    Left PAC site without redness     Laboratory Data    Results from last 7 days   Lab Units 24  0528 24  0428 04/15/24  1526 04/15/24  1137 04/15/24  0416    WBC 10*3/mm3 10.50 11.91*  --   --  16.58*   HEMOGLOBIN g/dL 8.4* 8.0* 8.4*   < > 8.1*   HEMATOCRIT % 25.0* 24.1* 25.1*   < > 24.3*   PLATELETS 10*3/mm3 185 179  --   --  171    < > = values in this interval not displayed.     Results from last 7 days   Lab Units 04/17/24  0528   SODIUM mmol/L 135*   POTASSIUM mmol/L 4.2   CHLORIDE mmol/L 102   CO2 mmol/L 19.0*   BUN mg/dL 53*   CREATININE mg/dL 8.37*   GLUCOSE mg/dL 98   CALCIUM mg/dL 7.9*     Results from last 7 days   Lab Units 04/14/24  0603   ALK PHOS U/L 61   BILIRUBIN mg/dL <0.2   ALT (SGPT) U/L 9   AST (SGOT) U/L 15             Results from last 7 days   Lab Units 04/13/24  2038   LACTATE mmol/L 1.6     Results from last 7 days   Lab Units 04/13/24  1043   CK TOTAL U/L 33         Estimated Creatinine Clearance: 8.4 mL/min (A) (by C-G formula based on SCr of 8.37 mg/dL (H)).      Microbiology:  Blood culture ×2 NGSF     Urine culture: No growth    Respiratory PCR panel: Negative      Radiology:  Imaging Results (Last 72 Hours)       ** No results found for the last 72 hours. **            Impression:   Sepsis, manifested by leukocytosis, acute kidney injury, hypotension, tachycardia, lactic acidosis -Suspect from urinary source. Urine and blood cultures are no growth.  Sepsis is improving.  Pyuria/UTI/bilateral pyelonephritis per CT- Urine culture was no growth  Acute kidney injury-ATN in the setting of sepsis/hypotension-Worsening. No eosinophilia in his CBC with differential and no urine eosinophils indicating that this is not AIN.  Severe leukocytosis with neutrophilia-Improved  NSCLC, s/p lobectomy, now on immunotherapy   Nausea and vomiting-likely secondary to pyelonephritis-Improved  Diarrhea, C. Difficile test was negative.  Ongoing intermittent diarrhea  Hypophosphatemia  Macrocytic anemia  10. Nonsmall cell lung cancer of the RLL, Stage IIIA     PLAN/RECOMMENDATIONS:   - Cefepime per pharmacy dosing. This will provide coverage for Pseudomonas and  other gram-negative rods.  No history of resistant bacteria per the patient's report and he has not had a lot of urinary tract infections in the past.  - Follow urine and blood cultures-Remain no growth  - Continue to follow CBC and CMP closely  - C. Difficile test was negative. GI PCR panel was negative    Renal function is worsening.  May need dialysis soon if no improvement.  Nephrology is following.    Holding off on renal biopsy for now but may consider if no improvement in renal function.  Urine output is improving.  Continuing to assess need for dialysis. Nephrology is giving some IV fluids today given oral mucosa is dry.    Copied text in this note has been reviewed and is accurate as of 04/17/24    Complex MDM    Derian Barry MD  4/17/2024  20:33 EDT

## 2024-04-18 NOTE — PROGRESS NOTES
INFECTIOUS DISEASE Progress note     David Barfield  1950  [id number]        Admission Date: 4/12/2024      Requesting Provider: No Known Provider  Evaluating Physician: Derian Barry MD    Reason for Consultation: sepsis     History of present illness:    David Barfield is a 74 y.o. male, with PMH NSCLC/RLL lobectomy (1/2024) currently on Opdivo, HTN, emphysema, seen today for sepsis. Last Opdivo was last Thursday,  presented to the ED with complaints of  nausea, vomiting, lower back pain, and diarrhea. He has been afebrile. Admitting labs with WBC 36.28, plt 291, LAC 6.7, PCT 4.88, Scr 2.23, ALT 15, AST 33, negative respiratory panel , and UA with TNTC WBCS. CXR with chronic findings.  CT concerning with bilateral pyelonephritis, circumferential wall thickening of the urinary bladder, no abscess, hydronephrosis. Started on Vancomycin , Ertapenem and we were consulted for evaluation and treatment.      4/14/24:  Tmax of 99.1. Blood cultures remain negative to date. One episode of diarrhea this am.  Still with moderate amount of sputum production with cough.  Complains of right sided rib pain with cough.  Wife at bedside. Am labs pending     4/15/24: The patient remains afebrile.  He states he is feeling somewhat better.  No flank pain or abdominal pain today.  Nausea has improved.  Still having some intermittent diarrhea. No dysuria today.  Renal function is worse in his creatinine is over 6. Blood cell count is improving and was down to 16.58 today    4/16/24: The patient remains afebrile.  He is feeling better and is eating better today.  No flank pain.  No nausea or vomiting.  Diarrhea has improved.  Urine output is improving. Creatinine is worse today at 7.03.  White blood cell count is improving to 11.9 today.    4/17/24: The patient does not feel as well today and is more fatigued.  He is not eating quite as well.  Creatinine is increased over 8 today.  He states that his urine output has  increased.  He is not having any fevers.  Leukocytosis improved.  No severe watery diarrhea reported.  No abdominal pain or flank pain.    4/18/24: No new complaints today.  He states that his urine output has increased further.  No abdominal or flank pain.  No fevers.  No nausea, vomiting, or diarrhea. Cell count has improved to 10.44.  Creatinine is worse today at 8.45.  Potassium is okay at 4.0.    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    Diabetes mellitus Borderline    Emphysema/COPD     Erectile disorder     GERD (gastroesophageal reflux disease)     History of chemotherapy     Hyperlipidemia     Hypertension 2019    Lung nodule     Mumps     Mumps     Pruritus     after bath    Slow to wake up after anesthesia     Wears dentures     upper only    Wears hearing aid in both ears     usually only wears right       Past Surgical History:   Procedure Laterality Date    BONE BIOPSY      broken bone surgery in his face    BRONCHOSCOPY THORACOTOMY Right 1/9/2024    Procedure: THORACOTOMY FOR LOWER LOBECTOMY AND MEDISTINAL LYMPH NODE DISSECTION RIGHT;  Surgeon: Joey Patel MD;  Location: [facility] OR;  Service: Cardiothoracic;  Laterality: Right;    BRONCHOSCOPY WITH ION ROBOTIC ASSIST N/A 09/15/2023    Procedure: BRONCHOSCOPY NAVIGATION WITH ENDOBRONCHIAL ULTRASOUND AND ION ROBOT;  Surgeon: Octaviano Sampson MD;  Location:  [facility];  Service: Robotics - Pulmonary;  Laterality: N/A;  ion #6 - 0032  - 0015  Cath guide 0061    EBUS balloon removed and intact    CARDIAC ELECTROPHYSIOLOGY PROCEDURE N/A 08/17/2021    Procedure: Pacemaker DC new;  Surgeon: Kayy Box MD;  Location:  [facility];  Service: Cardiology;  Laterality: N/A;    FACIAL FRACTURE SURGERY      PACEMAKER IMPLANTATION         Family History   Problem Relation Age of Onset    Aneurysm Mother   "       brain    Dementia Father     Leukemia Sister     Heart disease Paternal Grandmother     Hypertension Paternal Grandfather        Social History     Socioeconomic History    Marital status: Significant Other    Number of children: 3   Tobacco Use    Smoking status: Former     Current packs/day: 0.50     Average packs/day: 0.5 packs/day for 56.3 years (28.6 ttl pk-yrs)     Types: Cigarettes     Start date: 1/1/1968    Smokeless tobacco: Never    Tobacco comments:     Pt states that he quit smoking on 1/8/24. The day before his sx.    Vaping Use    Vaping status: Never Used   Substance and Sexual Activity    Alcohol use: Never    Drug use: Never    Sexual activity: Defer     Partners: Female     Birth control/protection: None       Allergies   Allergen Reactions    Cymbalta [Duloxetine Hcl] GI Intolerance    Gabapentin Mental Status Change     Pt states that this medication \"makes him feel foolish in his head\".     Remeron [Mirtazapine] Other (See Comments)     Excess sedation    Toradol [Ketorolac Tromethamine] GI Intolerance     Projectile vomiting     Latex Other (See Comments)     Latex allergy     Tape Rash         Medication:    Current Facility-Administered Medications:     acetaminophen (TYLENOL) tablet 650 mg, 650 mg, Oral, Q4H PRN, 650 mg at 04/13/24 2212 **OR** acetaminophen (TYLENOL) 160 MG/5ML oral solution 650 mg, 650 mg, Oral, Q4H PRN **OR** acetaminophen (TYLENOL) suppository 650 mg, 650 mg, Rectal, Q4H PRN, Rosalia Mao G, DO    amLODIPine (NORVASC) tablet 5 mg, 5 mg, Oral, Q24H, Dionne Samaniego, DO, 5 mg at 04/18/24 0830    budesonide-formoterol (SYMBICORT) 160-4.5 MCG/ACT inhaler 2 puff, 2 puff, Inhalation, BID - RT, 2 puff at 04/18/24 0836 **AND** tiotropium (SPIRIVA RESPIMAT) 2.5 mcg/act aerosol solution inhaler, 2 puff, Inhalation, Daily - RT, Rosalia Mao, DO, 2 puff at 04/18/24 0836    Calcium Replacement - Follow Nurse / BPA Driven Protocol, , Does not apply, PRN, Rosalia Mao, " DO    cefepime 2000 mg IVPB in 100 mL NS (MBP), 2,000 mg, Intravenous, Daily, Derian Barry MD, 2,000 mg at 04/18/24 0830    heparin (porcine) 5000 UNIT/ML injection 5,000 Units, 5,000 Units, Subcutaneous, Q8H, Daylin, Rosalia G, DO, 5,000 Units at 04/18/24 1423    hydrALAZINE (APRESOLINE) injection 10 mg, 10 mg, Intravenous, Q6H PRN, Dionne Samaniego, DO    HYDROcodone-acetaminophen (NORCO) 7.5-325 MG per tablet 1 tablet, 1 tablet, Oral, Q6H PRN, Daylin, Rosalia G, DO, 1 tablet at 04/13/24 1827    Magnesium Standard Dose Replacement - Follow Nurse / BPA Driven Protocol, , Does not apply, PRN, Daylin, Rosalia G, DO    nicotine (NICODERM CQ) 21 MG/24HR patch 1 patch, 1 patch, Transdermal, Q24H, Daylin, Rosalia G, DO, 1 patch at 04/17/24 2134    nicotine polacrilex (NICORETTE) gum 4 mg, 4 mg, Mouth/Throat, Q1H PRN, Daylin, Rosalia G, DO    ondansetron (ZOFRAN) injection 4 mg, 4 mg, Intravenous, Q6H PRN, Daylin, Rosalia G, DO    ondansetron ODT (ZOFRAN-ODT) disintegrating tablet 4 mg, 4 mg, Oral, Q6H PRN **OR** ondansetron (ZOFRAN) injection 4 mg, 4 mg, Intravenous, Q6H PRN, Daylin, Rosalia G, DO    [Held by provider] pantoprazole (PROTONIX) EC tablet 40 mg, 40 mg, Oral, Q AM, Daylin, Rosalia G, DO, 40 mg at 04/13/24 0532    pantoprazole (PROTONIX) injection 40 mg, 40 mg, Intravenous, Q12H, Amy Hernandez MD, 40 mg at 04/18/24 0830    pravastatin (PRAVACHOL) tablet 80 mg, 80 mg, Oral, Nightly, Daylin, Rosalia G, DO, 80 mg at 04/17/24 2127    prochlorperazine (COMPAZINE) tablet 5 mg, 5 mg, Oral, Q6H PRN, Daylin, Rosalia G, DO    sodium chloride 0.9 % flush 10 mL, 10 mL, Intravenous, PRN, Daylin, Rosalia G, DO, 10 mL at 04/16/24 1051    sodium chloride 0.9 % flush 10 mL, 10 mL, Intravenous, Q12H, Daylin, Rosalia G, DO, 10 mL at 04/18/24 0833    sodium chloride 0.9 % flush 10 mL, 10 mL, Intravenous, PRN, Daylin, Rosalia G, DO    sodium chloride 0.9 % infusion 40 mL, 40 mL, Intravenous, PRN, Daylin, Rosalia G, DO    tamsulosin (FLOMAX) 24 hr  capsule 0.4 mg, 0.4 mg, Oral, Daily, Rosalia Mao DO, 0.4 mg at 24 0830    Antibiotics:  Anti-Infectives (From admission, onward)      Ordered     Dose/Rate Route Frequency Start Stop    24 1025  cefepime 2000 mg IVPB in 100 mL NS (MBP)        Ordering Provider: Derian Barry MD    2,000 mg  over 4 Hours Intravenous Daily 24 0900 24 0859    24 1025  cefepime 2000 mg IVPB in 100 mL NS (MBP)        Ordering Provider: Derian aBrry MD    2,000 mg  over 30 Minutes Intravenous Once 24 1115 24 1301    24 1922  vancomycin IVPB 1750 mg in 0.9% Sodium Chloride (premix) 500 mL        Ordering Provider: Michelle Iyer PA-C    22 mg/kg × 78 kg  285.7 mL/hr over 105 Minutes Intravenous Once 24 2335    24 1922  piperacillin-tazobactam (ZOSYN) 3.375 g IVPB in 100 mL NS MBP (CD)        Ordering Provider: Michelle Iyer PA-C    3.375 g  over 30 Minutes Intravenous Once 24 1930 24 2148                Physical Exam:   Vital Signs  Temp (24hrs), Av.7 °F (36.5 °C), Min:97.4 °F (36.3 °C), Max:98 °F (36.7 °C)    Temp  Min: 97.4 °F (36.3 °C)  Max: 98 °F (36.7 °C)  BP  Min: 134/75  Max: 149/71  Pulse  Min: 70  Max: 81  Resp  Min: 18  Max: 20  SpO2  Min: 93 %  Max: 99 %    GENERAL: Sitting up in bed and slightly less frail appearing.  Awake and alert.  HEENT: Normocephalic, atraumatic.  No external oral lesions noted  HEART: RRR; No murmur  LUNGS: CTA B.  Nonlabored breathing on room air  ABDOMEN: Soft, nontender, nondistended. No rebound or guarding.   MSK: No new joint effusions noted.  SKIN: No new generalized rashes  NEURO: Oriented to PPT.  Normal speech and cognition.  PSYCHIATRIC: Normal insight and judgment. Cooperative with PE    Right PPM site without redness, tenderness    Left PAC site without redness     Laboratory Data    Results from last 7 days   Lab Units 24  0333 24  0528 24  0428   WBC 10*3/mm3  10.44 10.50 11.91*   HEMOGLOBIN g/dL 7.7* 8.4* 8.0*   HEMATOCRIT % 23.2* 25.0* 24.1*   PLATELETS 10*3/mm3 181 185 179     Results from last 7 days   Lab Units 04/18/24  0333   SODIUM mmol/L 137   POTASSIUM mmol/L 4.0   CHLORIDE mmol/L 105   CO2 mmol/L 19.0*   BUN mg/dL 54*   CREATININE mg/dL 8.45*   GLUCOSE mg/dL 105*   CALCIUM mg/dL 8.2*     Results from last 7 days   Lab Units 04/14/24  0603   ALK PHOS U/L 61   BILIRUBIN mg/dL <0.2   ALT (SGPT) U/L 9   AST (SGOT) U/L 15             Results from last 7 days   Lab Units 04/13/24  2038   LACTATE mmol/L 1.6     Results from last 7 days   Lab Units 04/13/24  1043   CK TOTAL U/L 33         Estimated Creatinine Clearance: 9.1 mL/min (A) (by C-G formula based on SCr of 8.45 mg/dL (H)).      Microbiology:  Blood culture ×2 NG    Urine culture: No growth    Respiratory PCR panel: Negative      Radiology:  Imaging Results (Last 72 Hours)       ** No results found for the last 72 hours. **            Impression:   Sepsis, manifested by leukocytosis, acute kidney injury, hypotension, tachycardia, lactic acidosis -Suspect from urinary source. Urine and blood cultures are no growth.  Sepsis is improving.  Pyuria/UTI/bilateral pyelonephritis per CT- Urine culture was no growth  Acute kidney injury-ATN in the setting of sepsis/hypotension-Worsening. No eosinophilia in his CBC with differential and no urine eosinophils indicating that this is not AIN.  Severe leukocytosis with neutrophilia-Improved  NSCLC, s/p lobectomy, now on immunotherapy   Nausea and vomiting-likely secondary to pyelonephritis-Improved  Diarrhea, C. Difficile test was negative.  Ongoing intermittent diarrhea  Hypophosphatemia-Improved  Macrocytic anemia-Ongoing but relatively stable  10. Nonsmall cell lung cancer of the RLL, Stage IIIA     PLAN/RECOMMENDATIONS:   - Cefepime per pharmacy dosing. Currently on day 7 of antibiotics.  Will consider a 10-14 day course for pyelonephritis  - Continue to follow CBC and  CMP closely    Renal function is worsening.  May need dialysis soon if no improvement.  Nephrology is following.    Holding off on renal biopsy for now but may consider if no improvement in renal function.  Urine output is improving.  Continuing to assess need for dialysis. Further decision regarding kidney biopsy will be done tomorrow if the renal function continues to increase or urine output decreases.    I discussed in length with the patient and his wife at bedside today.    Copied text in this note has been reviewed and is accurate as of 04/18/24    Complex MDM    Derian Barry MD  4/18/2024  19:28 EDT

## 2024-04-19 LAB
ALBUMIN SERPL-MCNC: 2.8 G/DL (ref 3.5–5.2)
ANION GAP SERPL CALCULATED.3IONS-SCNC: 12 MMOL/L (ref 5–15)
BASOPHILS # BLD AUTO: 0.03 10*3/MM3 (ref 0–0.2)
BASOPHILS NFR BLD AUTO: 0.3 % (ref 0–1.5)
BUN SERPL-MCNC: 45 MG/DL (ref 8–23)
BUN/CREAT SERPL: 6.2 (ref 7–25)
CALCIUM SPEC-SCNC: 8 MG/DL (ref 8.6–10.5)
CHLORIDE SERPL-SCNC: 110 MMOL/L (ref 98–107)
CO2 SERPL-SCNC: 17 MMOL/L (ref 22–29)
CREAT SERPL-MCNC: 7.29 MG/DL (ref 0.76–1.27)
DEPRECATED RDW RBC AUTO: 52.5 FL (ref 37–54)
EGFRCR SERPLBLD CKD-EPI 2021: 7.2 ML/MIN/1.73
EOSINOPHIL # BLD AUTO: 0.21 10*3/MM3 (ref 0–0.4)
EOSINOPHIL NFR BLD AUTO: 2 % (ref 0.3–6.2)
ERYTHROCYTE [DISTWIDTH] IN BLOOD BY AUTOMATED COUNT: 15.3 % (ref 12.3–15.4)
GLUCOSE SERPL-MCNC: 103 MG/DL (ref 65–99)
HCT VFR BLD AUTO: 23.4 % (ref 37.5–51)
HGB BLD-MCNC: 7.6 G/DL (ref 13–17.7)
IMM GRANULOCYTES # BLD AUTO: 0.13 10*3/MM3 (ref 0–0.05)
IMM GRANULOCYTES NFR BLD AUTO: 1.3 % (ref 0–0.5)
LYMPHOCYTES # BLD AUTO: 1.35 10*3/MM3 (ref 0.7–3.1)
LYMPHOCYTES NFR BLD AUTO: 13.1 % (ref 19.6–45.3)
MCH RBC QN AUTO: 30.3 PG (ref 26.6–33)
MCHC RBC AUTO-ENTMCNC: 32.5 G/DL (ref 31.5–35.7)
MCV RBC AUTO: 93.2 FL (ref 79–97)
MONOCYTES # BLD AUTO: 1.23 10*3/MM3 (ref 0.1–0.9)
MONOCYTES NFR BLD AUTO: 11.9 % (ref 5–12)
NEUTROPHILS NFR BLD AUTO: 7.36 10*3/MM3 (ref 1.7–7)
NEUTROPHILS NFR BLD AUTO: 71.4 % (ref 42.7–76)
NRBC BLD AUTO-RTO: 0 /100 WBC (ref 0–0.2)
PHOSPHATE SERPL-MCNC: 4.4 MG/DL (ref 2.5–4.5)
PLATELET # BLD AUTO: 219 10*3/MM3 (ref 140–450)
PMV BLD AUTO: 10.1 FL (ref 6–12)
POTASSIUM SERPL-SCNC: 4.2 MMOL/L (ref 3.5–5.2)
RBC # BLD AUTO: 2.51 10*6/MM3 (ref 4.14–5.8)
SODIUM SERPL-SCNC: 139 MMOL/L (ref 136–145)
WBC NRBC COR # BLD AUTO: 10.31 10*3/MM3 (ref 3.4–10.8)

## 2024-04-19 PROCEDURE — 99232 SBSQ HOSP IP/OBS MODERATE 35: CPT | Performed by: INTERNAL MEDICINE

## 2024-04-19 PROCEDURE — 94664 DEMO&/EVAL PT USE INHALER: CPT

## 2024-04-19 PROCEDURE — 94761 N-INVAS EAR/PLS OXIMETRY MLT: CPT

## 2024-04-19 PROCEDURE — 85025 COMPLETE CBC W/AUTO DIFF WBC: CPT | Performed by: INTERNAL MEDICINE

## 2024-04-19 PROCEDURE — 25010000002 CEFEPIME PER 500 MG: Performed by: INTERNAL MEDICINE

## 2024-04-19 PROCEDURE — 80069 RENAL FUNCTION PANEL: CPT | Performed by: INTERNAL MEDICINE

## 2024-04-19 PROCEDURE — 97110 THERAPEUTIC EXERCISES: CPT

## 2024-04-19 PROCEDURE — 94799 UNLISTED PULMONARY SVC/PX: CPT

## 2024-04-19 PROCEDURE — 25010000002 HEPARIN (PORCINE) PER 1000 UNITS: Performed by: INTERNAL MEDICINE

## 2024-04-19 RX ADMIN — CEFEPIME 2000 MG: 2 INJECTION, POWDER, FOR SOLUTION INTRAVENOUS at 08:27

## 2024-04-19 RX ADMIN — PANTOPRAZOLE SODIUM 40 MG: 40 INJECTION, POWDER, FOR SOLUTION INTRAVENOUS at 21:30

## 2024-04-19 RX ADMIN — Medication 10 ML: at 21:31

## 2024-04-19 RX ADMIN — Medication 1 PATCH: at 05:22

## 2024-04-19 RX ADMIN — HEPARIN SODIUM 5000 UNITS: 5000 INJECTION INTRAVENOUS; SUBCUTANEOUS at 21:30

## 2024-04-19 RX ADMIN — HEPARIN SODIUM 5000 UNITS: 5000 INJECTION INTRAVENOUS; SUBCUTANEOUS at 05:23

## 2024-04-19 RX ADMIN — PRAVASTATIN SODIUM 80 MG: 40 TABLET ORAL at 21:30

## 2024-04-19 RX ADMIN — BUDESONIDE AND FORMOTEROL FUMARATE DIHYDRATE 2 PUFF: 160; 4.5 AEROSOL RESPIRATORY (INHALATION) at 08:39

## 2024-04-19 RX ADMIN — HEPARIN SODIUM 5000 UNITS: 5000 INJECTION INTRAVENOUS; SUBCUTANEOUS at 14:02

## 2024-04-19 RX ADMIN — Medication 10 ML: at 08:28

## 2024-04-19 RX ADMIN — TIOTROPIUM BROMIDE INHALATION SPRAY 2 PUFF: 3.12 SPRAY, METERED RESPIRATORY (INHALATION) at 08:39

## 2024-04-19 RX ADMIN — BUDESONIDE AND FORMOTEROL FUMARATE DIHYDRATE 2 PUFF: 160; 4.5 AEROSOL RESPIRATORY (INHALATION) at 20:21

## 2024-04-19 RX ADMIN — AMLODIPINE BESYLATE 5 MG: 5 TABLET ORAL at 08:28

## 2024-04-19 RX ADMIN — TAMSULOSIN HYDROCHLORIDE 0.4 MG: 0.4 CAPSULE ORAL at 08:28

## 2024-04-19 RX ADMIN — PANTOPRAZOLE SODIUM 40 MG: 40 INJECTION, POWDER, FOR SOLUTION INTRAVENOUS at 08:27

## 2024-04-19 NOTE — PLAN OF CARE
Goal Outcome Evaluation:  Plan of Care Reviewed With: patient, friend        Progress: no change  Outcome Evaluation: Pt with good effort but continues to present below his functional baseline with weakness and impaired endurance. Ambulation of 400' with CGA and no AD was well tolerated. HR at 92 bpm. Further IPPT is warrented for addressing deficits. PT will progress as able per POC.      Anticipated Discharge Disposition (PT): home with assist, home with home health

## 2024-04-19 NOTE — PLAN OF CARE
Goal Outcome Evaluation:      VSS. NSR with some paced beats on tele. Pt wife very anxious to get pt up so pt and RN ambulated in halls. RN instructed wife and pt it was OK to walk if up to it. Pt with adequate UOP tonight.

## 2024-04-19 NOTE — PROGRESS NOTES
INFECTIOUS DISEASE Progress note     David Barfield  1950  [id number]        Admission Date: 4/12/2024      Requesting Provider: No Known Provider  Evaluating Physician: Derian Barry MD    Reason for Consultation: sepsis     History of present illness:    David Barfield is a 74 y.o. male, with PMH NSCLC/RLL lobectomy (1/2024) currently on Opdivo, HTN, emphysema, seen today for sepsis. Last Opdivo was last Thursday,  presented to the ED with complaints of  nausea, vomiting, lower back pain, and diarrhea. He has been afebrile. Admitting labs with WBC 36.28, plt 291, LAC 6.7, PCT 4.88, Scr 2.23, ALT 15, AST 33, negative respiratory panel , and UA with TNTC WBCS. CXR with chronic findings.  CT concerning with bilateral pyelonephritis, circumferential wall thickening of the urinary bladder, no abscess, hydronephrosis. Started on Vancomycin , Ertapenem and we were consulted for evaluation and treatment.      4/14/24:  Tmax of 99.1. Blood cultures remain negative to date. One episode of diarrhea this am.  Still with moderate amount of sputum production with cough.  Complains of right sided rib pain with cough.  Wife at bedside. Am labs pending     4/15/24: The patient remains afebrile.  He states he is feeling somewhat better.  No flank pain or abdominal pain today.  Nausea has improved.  Still having some intermittent diarrhea. No dysuria today.  Renal function is worse in his creatinine is over 6. Blood cell count is improving and was down to 16.58 today    4/16/24: The patient remains afebrile.  He is feeling better and is eating better today.  No flank pain.  No nausea or vomiting.  Diarrhea has improved.  Urine output is improving. Creatinine is worse today at 7.03.  White blood cell count is improving to 11.9 today.    4/17/24: The patient does not feel as well today and is more fatigued.  He is not eating quite as well.  Creatinine is increased over 8 today.  He states that his urine output has  increased.  He is not having any fevers.  Leukocytosis improved.  No severe watery diarrhea reported.  No abdominal pain or flank pain.    4/18/24: No new complaints today.  He states that his urine output has increased further.  No abdominal or flank pain.  No fevers.  No nausea, vomiting, or diarrhea. Cell count has improved to 10.44.  Creatinine is worse today at 8.45.  Potassium is okay at 4.0.    4/19/24: The patient is doing okay today.  No new complaints.  No nausea, vomiting, or diarrhea.  No flank pain or abdominal pain.  Creatinine is starting to trend back down.  Urine output remains good.  No fevers.    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    Diabetes mellitus Borderline    Emphysema/COPD     Erectile disorder     GERD (gastroesophageal reflux disease)     History of chemotherapy     Hyperlipidemia     Hypertension 2019    Lung nodule     Mumps     Mumps     Pruritus     after bath    Slow to wake up after anesthesia     Wears dentures     upper only    Wears hearing aid in both ears     usually only wears right       Past Surgical History:   Procedure Laterality Date    BONE BIOPSY      broken bone surgery in his face    BRONCHOSCOPY THORACOTOMY Right 1/9/2024    Procedure: THORACOTOMY FOR LOWER LOBECTOMY AND MEDISTINAL LYMPH NODE DISSECTION RIGHT;  Surgeon: Joey Patel MD;  Location: [facility] OR;  Service: Cardiothoracic;  Laterality: Right;    BRONCHOSCOPY WITH ION ROBOTIC ASSIST N/A 09/15/2023    Procedure: BRONCHOSCOPY NAVIGATION WITH ENDOBRONCHIAL ULTRASOUND AND ION ROBOT;  Surgeon: Octaviano Sampson MD;  Location:  [facility];  Service: Robotics - Pulmonary;  Laterality: N/A;  ion #6 - 0032  - 0015  Cath guide 0061    EBUS balloon removed and intact    CARDIAC ELECTROPHYSIOLOGY PROCEDURE N/A 08/17/2021    Procedure: Pacemaker DC new;  Surgeon: Kayy Box  "MD LUCIUS;  Location:  [facility];  Service: Cardiology;  Laterality: N/A;    FACIAL FRACTURE SURGERY      PACEMAKER IMPLANTATION         Family History   Problem Relation Age of Onset    Aneurysm Mother         brain    Dementia Father     Leukemia Sister     Heart disease Paternal Grandmother     Hypertension Paternal Grandfather        Social History     Socioeconomic History    Marital status: Significant Other    Number of children: 3   Tobacco Use    Smoking status: Former     Current packs/day: 0.50     Average packs/day: 0.5 packs/day for 56.3 years (28.6 ttl pk-yrs)     Types: Cigarettes     Start date: 1/1/1968    Smokeless tobacco: Never    Tobacco comments:     Pt states that he quit smoking on 1/8/24. The day before his sx.    Vaping Use    Vaping status: Never Used   Substance and Sexual Activity    Alcohol use: Never    Drug use: Never    Sexual activity: Defer     Partners: Female     Birth control/protection: None       Allergies   Allergen Reactions    Cymbalta [Duloxetine Hcl] GI Intolerance    Gabapentin Mental Status Change     Pt states that this medication \"makes him feel foolish in his head\".     Remeron [Mirtazapine] Other (See Comments)     Excess sedation    Toradol [Ketorolac Tromethamine] GI Intolerance     Projectile vomiting     Latex Other (See Comments)     Latex allergy     Tape Rash         Medication:    Current Facility-Administered Medications:     acetaminophen (TYLENOL) tablet 650 mg, 650 mg, Oral, Q4H PRN, 650 mg at 04/13/24 2212 **OR** acetaminophen (TYLENOL) 160 MG/5ML oral solution 650 mg, 650 mg, Oral, Q4H PRN **OR** acetaminophen (TYLENOL) suppository 650 mg, 650 mg, Rectal, Q4H PRN, Rosalia Mao G, DO    amLODIPine (NORVASC) tablet 5 mg, 5 mg, Oral, Q24H, Dinone Samaniego R, DO, 5 mg at 04/19/24 0828    budesonide-formoterol (SYMBICORT) 160-4.5 MCG/ACT inhaler 2 puff, 2 puff, Inhalation, BID - RT, 2 puff at 04/19/24 0839 **AND** tiotropium (SPIRIVA " RESPIMAT) 2.5 mcg/act aerosol solution inhaler, 2 puff, Inhalation, Daily - RT, Tatiana Maosie G, DO, 2 puff at 04/19/24 0839    Calcium Replacement - Follow Nurse / BPA Driven Protocol, , Does not apply, PRN, Daylin, Rosalia G, DO    cefepime 2000 mg IVPB in 100 mL NS (MBP), 2,000 mg, Intravenous, Daily, Derian Barry MD, 2,000 mg at 04/19/24 0827    heparin (porcine) 5000 UNIT/ML injection 5,000 Units, 5,000 Units, Subcutaneous, Q8H, Daylin, Rosalia G, DO, 5,000 Units at 04/19/24 1402    hydrALAZINE (APRESOLINE) injection 10 mg, 10 mg, Intravenous, Q6H PRN, Dionne Samaniego, DO    HYDROcodone-acetaminophen (NORCO) 7.5-325 MG per tablet 1 tablet, 1 tablet, Oral, Q6H PRN, DaylinTatiana coonsie G, DO, 1 tablet at 04/13/24 1827    Magnesium Standard Dose Replacement - Follow Nurse / BPA Driven Protocol, , Does not apply, PRN, Daylin, Rosalia G, DO    nicotine (NICODERM CQ) 21 MG/24HR patch 1 patch, 1 patch, Transdermal, Q24H, DaylinTatiana coonsie G, DO, 1 patch at 04/19/24 0522    nicotine polacrilex (NICORETTE) gum 4 mg, 4 mg, Mouth/Throat, Q1H PRN, Daylin, Rosalia G, DO    ondansetron (ZOFRAN) injection 4 mg, 4 mg, Intravenous, Q6H PRN, Daylin, Rosalia G, DO    ondansetron ODT (ZOFRAN-ODT) disintegrating tablet 4 mg, 4 mg, Oral, Q6H PRN **OR** ondansetron (ZOFRAN) injection 4 mg, 4 mg, Intravenous, Q6H PRN, Daylin, Rosalia G, DO    [Held by provider] pantoprazole (PROTONIX) EC tablet 40 mg, 40 mg, Oral, Q AM, Daylin, Rosalia G, DO, 40 mg at 04/13/24 0532    pantoprazole (PROTONIX) injection 40 mg, 40 mg, Intravenous, Q12H, Amy Hernandez MD, 40 mg at 04/19/24 0827    pravastatin (PRAVACHOL) tablet 80 mg, 80 mg, Oral, Nightly, Daylin, Rosalia G, DO, 80 mg at 04/18/24 2042    prochlorperazine (COMPAZINE) tablet 5 mg, 5 mg, Oral, Q6H PRN, Daylin, Rosalia G, DO    sodium chloride 0.9 % flush 10 mL, 10 mL, Intravenous, PRN, Daylin, Rosalia G, DO, 10 mL at 04/16/24 1051    sodium chloride 0.9 % flush 10 mL, 10 mL, Intravenous, Q12H, Daylin, Rosalia G,  DO, 10 mL at 24 0828    sodium chloride 0.9 % flush 10 mL, 10 mL, Intravenous, PRN, Daylin, Rosalia G, DO    sodium chloride 0.9 % infusion 40 mL, 40 mL, Intravenous, PRN, Daylin, Rosalia G, DO    tamsulosin (FLOMAX) 24 hr capsule 0.4 mg, 0.4 mg, Oral, Daily, Daylin, Rosalia G, DO, 0.4 mg at 24 0828    Antibiotics:  Anti-Infectives (From admission, onward)      Ordered     Dose/Rate Route Frequency Start Stop    24 1025  cefepime 2000 mg IVPB in 100 mL NS (MBP)        Ordering Provider: Derian Barry MD    2,000 mg  over 4 Hours Intravenous Daily 24 0900 24 0859    24 1025  cefepime 2000 mg IVPB in 100 mL NS (MBP)        Ordering Provider: Derian Barry MD    2,000 mg  over 30 Minutes Intravenous Once 24 1115 24 1301    24 1922  vancomycin IVPB 1750 mg in 0.9% Sodium Chloride (premix) 500 mL        Ordering Provider: Michelle Iyer PA-C    22 mg/kg × 78 kg  285.7 mL/hr over 105 Minutes Intravenous Once 24 2335    24 1922  piperacillin-tazobactam (ZOSYN) 3.375 g IVPB in 100 mL NS MBP (CD)        Ordering Provider: Michelle Iyer PA-C    3.375 g  over 30 Minutes Intravenous Once 24 1930 24 2148                Physical Exam:   Vital Signs  Temp (24hrs), Av.8 °F (36.6 °C), Min:97.4 °F (36.3 °C), Max:98 °F (36.7 °C)    Temp  Min: 97.4 °F (36.3 °C)  Max: 98 °F (36.7 °C)  BP  Min: 124/67  Max: 150/81  Pulse  Min: 70  Max: 75  Resp  Min: 16  Max: 18  SpO2  Min: 95 %  Max: 100 %    GENERAL: Sitting up in bed.  No acute distress.  HEENT: Normocephalic, atraumatic.  No external oral lesions noted  HEART: RRR; No murmur  LUNGS: CTA B.  Nonlabored breathing on room air  ABDOMEN: Soft, nontender, nondistended. No rebound or guarding.   MSK: No new joint effusions noted. No CVA tenderness bilaterally  SKIN: No new generalized rashes  NEURO: Awake and alert. Oriented to PPT.  Normal speech and cognition.  PSYCHIATRIC: Normal  insight and judgment. Cooperative with PE    Right PPM site without redness, tenderness    Left PAC site without redness     Laboratory Data    Results from last 7 days   Lab Units 04/19/24  0323 04/18/24  0333 04/17/24  0528   WBC 10*3/mm3 10.31 10.44 10.50   HEMOGLOBIN g/dL 7.6* 7.7* 8.4*   HEMATOCRIT % 23.4* 23.2* 25.0*   PLATELETS 10*3/mm3 219 181 185     Results from last 7 days   Lab Units 04/19/24  0323   SODIUM mmol/L 139   POTASSIUM mmol/L 4.2   CHLORIDE mmol/L 110*   CO2 mmol/L 17.0*   BUN mg/dL 45*   CREATININE mg/dL 7.29*   GLUCOSE mg/dL 103*   CALCIUM mg/dL 8.0*     Results from last 7 days   Lab Units 04/14/24  0603   ALK PHOS U/L 61   BILIRUBIN mg/dL <0.2   ALT (SGPT) U/L 9   AST (SGOT) U/L 15             Results from last 7 days   Lab Units 04/13/24  2038   LACTATE mmol/L 1.6     Results from last 7 days   Lab Units 04/13/24  1043   CK TOTAL U/L 33         Estimated Creatinine Clearance: 10.6 mL/min (A) (by C-G formula based on SCr of 7.29 mg/dL (H)).      Microbiology:  Blood culture ×2 NG    Urine culture: No growth    Respiratory PCR panel: Negative      Radiology:  Imaging Results (Last 72 Hours)       ** No results found for the last 72 hours. **            Impression:   Sepsis, manifested by leukocytosis, acute kidney injury, hypotension, tachycardia, lactic acidosis -Suspect from urinary source. Urine and blood cultures are no growth.  Sepsis is improving.  Pyuria/UTI/bilateral pyelonephritis per CT- Urine culture was no growth  Acute kidney injury-ATN in the setting of sepsis/hypotension- No eosinophilia in his CBC with differential and no urine eosinophils indicating that this is not AIN. Now improving  Severe leukocytosis with neutrophilia-Improved  NSCLC, s/p lobectomy, now on immunotherapy   Nausea and vomiting-likely secondary to pyelonephritis-Improved  Diarrhea, C. Difficile test was negative.  Ongoing intermittent diarrhea  Hypophosphatemia-Improved  Macrocytic anemia-Ongoing but  relatively stable  10. Nonsmall cell lung cancer of the RLL, Stage IIIA     PLAN/RECOMMENDATIONS:   - Cefepime per pharmacy dosing. Currently on day 8 of antibiotics.  Will consider a 10 day course for pyelonephritis. Could potentially receive his last dose on Monday if he is doing well.  - Continue to follow CBC and CMP closely    Renal function is now improving.  Urine output is good.  Hopefully can avoid dialysis.    I would be okay with the patient's discharge early next week after he is completed his last dose of cefepime if his renal function continues to improve and he is otherwise doing well.    I discussed in length with the patient and his wife at bedside today.    I will be off over the weekend.  Please page one of my on-call partners with any urgent issues.    Copied text in this note has been reviewed and is accurate as of 04/19/24    Complex YONAS Barry MD  4/19/2024  16:43 EDT

## 2024-04-19 NOTE — CASE MANAGEMENT/SOCIAL WORK
Continued Stay Note  [facility]     Patient Name: David Barfield  MRN: [id number]  Today's Date: 4/19/2024    Admit Date: 4/12/2024    Plan: Home   Discharge Plan       Row Name 04/19/24 0945       Plan    Plan Comments Plan remains for pt to discharge home when ready. Pt had no current discharge needs or concerns. Wife to provide transportation when ready. MSW continues to follow for discharge needs as arise.                   Discharge Codes    No documentation.                 Expected Discharge Date and Time       Expected Discharge Date Expected Discharge Time    Apr 17, 2024               TOLU Colon

## 2024-04-19 NOTE — THERAPY TREATMENT NOTE
Patient Name: David Barfield  : 1950    MRN: [id number]                              Today's Date: 2024       Admit Date: 2024    Visit Dx:     ICD-10-CM ICD-9-CM   1. Sepsis with acute renal failure without septic shock, due to unspecified organism, unspecified acute renal failure type  A41.9 038.9    R65.20 995.92    N17.9 584.9   2. Pyelonephritis  N12 590.80   3. Nausea vomiting and diarrhea  R11.2 787.91    R19.7 787.01   4. Leukocytosis, unspecified type  D72.829 288.60   5. Lactic acidosis  E87.20 276.2   6. Generalized weakness  R53.1 780.79   7. Postural dizziness with near syncope  R42 780.4    R55 780.2   8. Bronchogenic cancer of right lung  C34.91 162.9   9. Immunosuppression  D84.9 279.9   10. Elevated procalcitonin  R79.89 790.99   11. History of emphysema  J43.9 492.8   12. History of diabetes mellitus  Z86.39 V12.29   13. History of hypertension  Z86.79 V12.59   14. Former smoker  Z87.891 V15.82     Patient Active Problem List   Diagnosis    High degree atrioventricular block    Bradycardia, sinus    Chronic hypotension    PVC's (premature ventricular contractions)    Presence of cardiac pacemaker    Mixed hyperlipidemia    Centrilobular emphysema    Nodule of lower lobe of right lung    Mediastinal adenopathy    Cough    Tobacco abuse    Bronchogenic cancer of right lung    Malignant neoplasm of lower lobe of right lung    Primary hypertension    GERD without esophagitis    Sepsis    Acute pyelonephritis    ARACELI (acute kidney injury)    Hypokalemia    Severe malnutrition     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    Diabetes mellitus Borderline    Emphysema/COPD     Erectile disorder     GERD (gastroesophageal reflux disease)     History of chemotherapy     Hyperlipidemia     Hypertension 2019    Lung nodule     Mumps     Mumps     Pruritus     after bath    Slow  to wake up after anesthesia     Wears dentures     upper only    Wears hearing aid in both ears     usually only wears right     Past Surgical History:   Procedure Laterality Date    BONE BIOPSY      broken bone surgery in his face    BRONCHOSCOPY THORACOTOMY Right 1/9/2024    Procedure: THORACOTOMY FOR LOWER LOBECTOMY AND MEDISTINAL LYMPH NODE DISSECTION RIGHT;  Surgeon: Joey Patel MD;  Location:  [facility];  Service: Cardiothoracic;  Laterality: Right;    BRONCHOSCOPY WITH ION ROBOTIC ASSIST N/A 09/15/2023    Procedure: BRONCHOSCOPY NAVIGATION WITH ENDOBRONCHIAL ULTRASOUND AND ION ROBOT;  Surgeon: Octaviano Sampson MD;  Location:  [facility];  Service: Robotics - Pulmonary;  Laterality: N/A;  ion #6 - 0032  - 0015  Cath guide 0061    EBUS balloon removed and intact    CARDIAC ELECTROPHYSIOLOGY PROCEDURE N/A 08/17/2021    Procedure: Pacemaker DC new;  Surgeon: Kayy Box MD;  Location:  [facility];  Service: Cardiology;  Laterality: N/A;    FACIAL FRACTURE SURGERY      PACEMAKER IMPLANTATION        General Information       Row Name 04/19/24 1130          Physical Therapy Time and Intention    Document Type therapy note (daily note)  -AB     Mode of Treatment physical therapy  -AB       Row Name 04/19/24 1130          General Information    Patient Profile Reviewed yes  -AB     Existing Precautions/Restrictions fall;other (see comments)  RLL lobectomy (1/24)  -AB     Barriers to Rehab previous functional deficit;medically complex  -AB       Row Name 04/19/24 1130          Cognition    Orientation Status (Cognition) oriented x 3  -AB       Row Name 04/19/24 1130          Safety Issues, Functional Mobility    Safety Issues Affecting Function (Mobility) awareness of need for assistance;insight into deficits/self-awareness;safety precaution awareness;safety precautions follow-through/compliance  -AB     Impairments Affecting Function (Mobility) balance;endurance/activity  tolerance;shortness of breath;postural/trunk control;strength  -AB               User Key  (r) = Recorded By, (t) = Taken By, (c) = Cosigned By      Initials Name Provider Type    Marie Apple PT Physical Therapist                   Mobility       Row Name 04/19/24 1131          Bed Mobility    Bed Mobility supine-sit;sit-supine;scooting/bridging  -AB     Scooting/Bridging [place] (Bed Mobility) modified independence  -AB     Supine-Sit [place] (Bed Mobility) modified independence  -AB     Sit-Supine [place] (Bed Mobility) modified independence  -AB     Assistive Device (Bed Mobility) head of bed elevated  -AB     Comment, (Bed Mobility) No issues noted  -AB       Row Name 04/19/24 1131          Transfers    Comment, (Transfers) Cues for hand placement and sequencing. No AD used.  -AB       Row Name 04/19/24 1131          Sit-Stand Transfer    Sit-Stand [place] (Transfers) standby assist;1 person assist  -AB       Row Name 04/19/24 1131          Gait/Stairs (Locomotion)    [place] Level (Gait) 1 person assist;contact guard;verbal cues  -AB     Distance in Feet (Gait) 400  -AB     Deviations/Abnormal Patterns (Gait) bilateral deviations;melissa decreased;gait speed decreased;stride length decreased  -AB     Bilateral Gait Deviations heel strike decreased;forward flexed posture  -AB     Comment, (Gait/Stairs) Pt ambulated with step through gait pattern and slowed melissa. Cues for upright posture and PLB. Further activity limited by fatigue. O2 sat >90% upon return to room. HR at 92 bpm. Pt requests that PT keeping seeing him in order to improve endurance.  -AB               User Key  (r) = Recorded By, (t) = Taken By, (c) = Cosigned By      Initials Name Provider Type    Marie Apple, PT Physical Therapist                   Obj/Interventions       Row Name 04/19/24 1133          Motor Skills    Therapeutic Exercise hip;knee;ankle  -AB       Row Name 04/19/24 1133          Hip  (Therapeutic Exercise)    Hip (Therapeutic Exercise) strengthening exercise  -AB     Hip Strengthening (Therapeutic Exercise) bilateral;marching while seated;20 repititions  -AB       Row Name 04/19/24 1133          Knee (Therapeutic Exercise)    Knee Strengthening (Therapeutic Exercise) bilateral;SLR (straight leg raise);LAQ (long arc quad);heel slides;10 repetitions  -AB       Row Name 04/19/24 1133          Ankle (Therapeutic Exercise)    Ankle AROM (Therapeutic Exercise) bilateral;dorsiflexion;plantarflexion;30 repititions  -AB       Row Name 04/19/24 1133          Balance    Balance Assessment sitting static balance;sitting dynamic balance;standing static balance;standing dynamic balance  -AB     Static Sitting Balance independent  -AB     Dynamic Sitting Balance independent  -AB     Position, Sitting Balance unsupported;sitting edge of bed  -AB     Static Standing Balance standby assist  -AB     Dynamic Standing Balance contact guard  -AB     Position/Device Used, Standing Balance unsupported  -AB     Balance Interventions sitting;standing;sit to stand;static;dynamic;occupation based/functional task  -AB     Comment, Balance No overt LOB.  -AB               User Key  (r) = Recorded By, (t) = Taken By, (c) = Cosigned By      Initials Name Provider Type    AB Marie Becker, PT Physical Therapist                   Goals/Plan    No documentation.                  Clinical Impression       Row Name 04/19/24 1134          Pain    Pretreatment Pain Rating 0/10 - no pain  -AB     Posttreatment Pain Rating 0/10 - no pain  -AB       Row Name 04/19/24 1134          Plan of Care Review    Plan of Care Reviewed With patient;friend  -AB     Progress no change  -AB     Outcome Evaluation Pt with good effort but continues to present below his functional baseline with weakness and impaired endurance. Ambulation of 400' with CGA and no AD was well tolerated. HR at 92 bpm. Further IPPT is warrented for addressing deficits. PT  will progress as able per POC.  -AB       Row Name 04/19/24 1134          Vital Signs    Pre Systolic BP Rehab 147  -AB     Pre Treatment Diastolic BP 74  -AB     Pretreatment Heart Rate (beats/min) 81  -AB     Posttreatment Heart Rate (beats/min) 92  -AB     Pre SpO2 (%) 99  -AB     O2 Delivery Pre Treatment room air  -AB     Intra SpO2 (%) 97  -AB     O2 Delivery Intra Treatment room air  -AB     Post SpO2 (%) 98  -AB     O2 Delivery Post Treatment room air  -AB     Pre Patient Position Supine  -AB     Intra Patient Position Standing  -AB     Post Patient Position Supine  -AB       Row Name 04/19/24 1134          Positioning and Restraints    Pre-Treatment Position in bed  -AB     Post Treatment Position bed  -AB     In Bed notified nsg;supine;call light within reach;with family/caregiver  Pt up ad liliya in room  -AB               User Key  (r) = Recorded By, (t) = Taken By, (c) = Cosigned By      Initials Name Provider Type    AB Marie Becker, PT Physical Therapist                   Outcome Measures       Row Name 04/19/24 1137 04/19/24 0300       How much help from another person do you currently need...    Turning from your back to your side while in flat bed without using bedrails? 4  -AB 4  -CM    Moving from lying on back to sitting on the side of a flat bed without bedrails? 4  -AB 3  -CM    Moving to and from a bed to a chair (including a wheelchair)? 3  -AB 3  -CM    Standing up from a chair using your arms (e.g., wheelchair, bedside chair)? 3  -AB 3  -CM    Climbing 3-5 steps with a railing? 3  -AB 3  -CM    To walk in hospital room? 3  -AB 3  -CM    AM-PAC 6 Clicks Score (PT) 20  -AB 19  -CM    Highest Level of Mobility Goal 6 --> Walk 10 steps or more  -AB 6 --> Walk 10 steps or more  -CM      Row Name 04/19/24 1137          Functional Assessment    Outcome Measure Options AM-PAC 6 Clicks Basic Mobility (PT)  -AB               User Key  (r) = Recorded By, (t) = Taken By, (c) = Cosigned By       Initials Name Provider Type    Brenda Colin, RN Registered Nurse    AB Marie Becker, PT Physical Therapist                                 Physical Therapy Education       Title: PT OT SLP Therapies (Done)       Topic: Physical Therapy (Done)       Point: Mobility training (Done)       Learning Progress Summary             Patient Acceptance, E,D, VU,NR by AB at 4/19/2024 1137    Eager, E, VU,DU,NR by SS at 4/16/2024 1604    Comment: Reviewed safety/technique w/bed mobility, transfers, ambulation, HEP, pursed lip breathing, paced activity, PT POC    Acceptance, E,D, VU,NR by AB at 4/15/2024 1541    Acceptance, E, VU by AE at 4/14/2024 1320                         Point: Home exercise program (Done)       Learning Progress Summary             Patient Acceptance, E,D, VU,NR by AB at 4/19/2024 1137    Eager, E, VU,DU,NR by SS at 4/16/2024 1604    Comment: Reviewed safety/technique w/bed mobility, transfers, ambulation, HEP, pursed lip breathing, paced activity, PT POC    Acceptance, E,D, VU,NR by AB at 4/15/2024 1541                         Point: Body mechanics (Done)       Learning Progress Summary             Patient Acceptance, E,D, VU,NR by AB at 4/19/2024 1137    Eager, E, VU,DU,NR by SS at 4/16/2024 1604    Comment: Reviewed safety/technique w/bed mobility, transfers, ambulation, HEP, pursed lip breathing, paced activity, PT POC    Acceptance, E,D, VU,NR by AB at 4/15/2024 1541    Acceptance, E, VU by AE at 4/14/2024 1320                         Point: Precautions (Done)       Learning Progress Summary             Patient Acceptance, E,D, VU,NR by AB at 4/19/2024 1137    Eager, E, VU,DU,NR by SS at 4/16/2024 1604    Comment: Reviewed safety/technique w/bed mobility, transfers, ambulation, HEP, pursed lip breathing, paced activity, PT POC    Acceptance, E,D, VU,NR by AB at 4/15/2024 1541    Acceptance, E, VU by AE at 4/14/2024 1320                                         User Key       Initials  Effective Dates Name Provider Type Discipline    SS 06/01/21 -  Amanda Scanlon, PT Physical Therapist PT    AE 09/21/21 -  Jeff Carbone, PT Physical Therapist PT    AB 09/22/22 -  Marie Becker, PT Physical Therapist PT                  PT Recommendation and Plan     Plan of Care Reviewed With: patient, friend  Progress: no change  Outcome Evaluation: Pt with good effort but continues to present below his functional baseline with weakness and impaired endurance. Ambulation of 400' with CGA and no AD was well tolerated. HR at 92 bpm. Further IPPT is warrented for addressing deficits. PT will progress as able per POC.     Time Calculation:         PT Charges       Row Name 04/19/24 1137             Time Calculation    Start Time 1113  -AB      PT Received On 04/19/24  -AB         Timed Charges    08071 - PT Therapeutic Exercise Minutes 8  -AB      44171 - Gait Training Minutes  8  -AB         Total Minutes    Timed Charges Total Minutes 16  -AB       Total Minutes 16  -AB                User Key  (r) = Recorded By, (t) = Taken By, (c) = Cosigned By      Initials Name Provider Type    AB Marie Becker, PT Physical Therapist                  Therapy Charges for Today       Code Description Service Date Service Provider Modifiers Qty    81334126980 HC PT THER PROC EA 15 MIN 4/19/2024 Marie Becker, PT GP 1            PT G-Codes  Outcome Measure Options: AM-PAC 6 Clicks Basic Mobility (PT)  AM-PAC 6 Clicks Score (PT): 20  PT Discharge Summary  Anticipated Discharge Disposition (PT): home with assist, home with home health    Marie Becker PT  4/19/2024

## 2024-04-19 NOTE — PROGRESS NOTES
"   LOS: 7 days    Patient Care Team:  Shahid Drake MD as PCP - General (Family Medicine)  Octaviano Sampson MD as Consulting Physician (Pulmonary Disease)  Neetu Ashley MD as Referring Physician (Hematology and Oncology)  Nimo Rodríguez MD as Consulting Physician (Radiation Oncology)    Subjective   No new events.  Renal function slightly better.  Good urine output.  Review of system:  Denies any nausea vomiting chest pain or shortness of breath.    Objective     Vital Signs:  Blood pressure 124/67, pulse 70, temperature 97.7 °F (36.5 °C), temperature source Oral, resp. rate 18, height 182.9 cm (72\"), weight 85.6 kg (188 lb 12.8 oz), SpO2 98%.      Intake/Output Summary (Last 24 hours) at 4/19/2024 1723  Last data filed at 4/19/2024 1525  Gross per 24 hour   Intake --   Output 4250 ml   Net -4250 ml        04/18 0701 - 04/19 0700  In: -   Out: 3210 [Urine:3210]    Physical Exam:  General Appearance:  male awake alert oriented no obvious distress laying comfortably in bed.  Eyes: PER, EOMI.  Neck: Supple no JVD.  Lungs: Clear auscultation, no rales rhonchi's, equal chest movement, nonlabored.  Heart: No gallop, murmur, rub, RRR.  Abdomen: Soft, nontender, positive bowel sounds, no organomegaly.  Extremities: No edema, no cyanosis.  Neuro: No focal deficit, moving all extremities, alert oriented X 3  Skin: Warm and dry.    Labs:  Results from last 7 days   Lab Units 04/19/24  0323 04/18/24  0333 04/17/24  0528   WBC 10*3/mm3 10.31 10.44 10.50   HEMOGLOBIN g/dL 7.6* 7.7* 8.4*   PLATELETS 10*3/mm3 219 181 185     Results from last 7 days   Lab Units 04/19/24  0323 04/18/24  0333 04/17/24  0528 04/16/24  0428 04/15/24  0416 04/14/24  0603 04/13/24  1043 04/13/24  0330 04/13/24  0158 04/12/24  1905   SODIUM mmol/L 139 137 135* 135* 136 133*  --  140  --  142   POTASSIUM mmol/L 4.2 4.0 4.2 4.5 4.5 4.4   < > 4.7  --  3.4*   CHLORIDE mmol/L 110* 105 102 103 104 101  --  113*  --  105   CO2 mmol/L 17.0* 19.0* " 19.0* 21.0* 21.0* 20.0*  --  16.0*  --  21.0*   BUN mg/dL 45* 54* 53* 54* 49* 39*  --  25*  --  22   CREATININE mg/dL 7.29* 8.45* 8.37* 7.03* 6.14* 4.58*  --  2.73*  --  2.23*   CALCIUM mg/dL 8.0* 8.2* 7.9* 8.0* 7.7* 7.7*  --  7.9*  --  9.2   PHOSPHORUS mg/dL 4.4 4.5  --   --  3.4 2.8   < >  --  1.7*  --    MAGNESIUM mg/dL  --   --   --   --  2.5* 2.6*  --   --  1.3* 1.8  1.7   ALBUMIN g/dL 2.8* 2.4*  --   --   --  2.5*  --  2.4*  --  3.6    < > = values in this interval not displayed.     Results from last 7 days   Lab Units 04/14/24  0603   ALK PHOS U/L 61   BILIRUBIN mg/dL <0.2   ALT (SGPT) U/L 9   AST (SGOT) U/L 15          Estimated Creatinine Clearance: 10.6 mL/min (A) (by C-G formula based on SCr of 7.29 mg/dL (H)).         A/P:    1.  ARF: Renal function slightly improved creatinine down to 7.29.  Nonoliguric.  Baseline cr ~ 0.8mg/dl. Cr on this admission 2.2-2.7mg/dl. UA large blood, trace protein, large aspen esterase many wbc++. CT showed perinephric fat stranding consistent with pyelonephritis. Urine na 74 urine cl 16.  Patient likely with tubulointerstitial inflammation in the setting of infection with component of ATN due to hemodynamic instability on ACEI I with nausea, vomiting and poor po intake.. 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  Urine eos negative.  Doubt reaction due to antibiotics.      Urine output increased with decreased oral intake oral mucosa is dry.  Patient seems to be volume depleted will give IV fluid today.    Further decision regards to kidney biopsy will be done tomorrow if the renal function continue to titrate or the urine output decreases.    2.  Hypertension: Patient initially septic with pyelonephritis.  Improved with volume resuscitation on IVF.  Continues off all blood pressure medications..  RAAS suppression on hold with ARF.  Cover with as needed medications for now.    3.  Hyponatremia: Improving overnight.  Monitor for now.  All fluids  isotonic.    4.  Hyperkalemia: Resolved.  Potassium initially up to 5.7 improved to normal range post Lokelma.  RAAS suppression on hold..    5.  Metabolic acidosis: Stable.  Bicarb initially quite low at 16 with an underlying lactic acidemia due to poor perfusion.  Bicarb improved with perfusion support.  Would monitor for now.    6.  Anemia: Hemoglobin below goal.  Transfuse as indicated for Hgb <7.  Patient with possible GI bleed.  Recent black stools.  Workup underway.    7.  Volume: Urine output increasing.  For now continue strict I's and O's.    8.  Small cell CA: Last treatment with Opdivo 4/4/2024.  Follows with Dr. Gely Ashley.    High complex patient with multiple medical problems urine output improved renal function slightly better will hold the biopsy for now.  Patient is asymptomatic.  Discussed with the wife in detail.      Alok Dixon MD  04/19/24  17:23 EDT

## 2024-04-20 LAB
ALBUMIN SERPL-MCNC: 2.9 G/DL (ref 3.5–5.2)
ANION GAP SERPL CALCULATED.3IONS-SCNC: 14 MMOL/L (ref 5–15)
BASOPHILS # BLD AUTO: 0.04 10*3/MM3 (ref 0–0.2)
BASOPHILS NFR BLD AUTO: 0.4 % (ref 0–1.5)
BUN SERPL-MCNC: 38 MG/DL (ref 8–23)
BUN/CREAT SERPL: 5.9 (ref 7–25)
CALCIUM SPEC-SCNC: 8.3 MG/DL (ref 8.6–10.5)
CHLORIDE SERPL-SCNC: 107 MMOL/L (ref 98–107)
CO2 SERPL-SCNC: 19 MMOL/L (ref 22–29)
CREAT SERPL-MCNC: 6.41 MG/DL (ref 0.76–1.27)
DEPRECATED RDW RBC AUTO: 53.8 FL (ref 37–54)
EGFRCR SERPLBLD CKD-EPI 2021: 8.4 ML/MIN/1.73
EOSINOPHIL # BLD AUTO: 0.28 10*3/MM3 (ref 0–0.4)
EOSINOPHIL NFR BLD AUTO: 2.6 % (ref 0.3–6.2)
ERYTHROCYTE [DISTWIDTH] IN BLOOD BY AUTOMATED COUNT: 15.6 % (ref 12.3–15.4)
GLUCOSE SERPL-MCNC: 97 MG/DL (ref 65–99)
HCT VFR BLD AUTO: 24.8 % (ref 37.5–51)
HGB BLD-MCNC: 8 G/DL (ref 13–17.7)
IMM GRANULOCYTES # BLD AUTO: 0.12 10*3/MM3 (ref 0–0.05)
IMM GRANULOCYTES NFR BLD AUTO: 1.1 % (ref 0–0.5)
LYMPHOCYTES # BLD AUTO: 1.33 10*3/MM3 (ref 0.7–3.1)
LYMPHOCYTES NFR BLD AUTO: 12.2 % (ref 19.6–45.3)
MCH RBC QN AUTO: 30.4 PG (ref 26.6–33)
MCHC RBC AUTO-ENTMCNC: 32.3 G/DL (ref 31.5–35.7)
MCV RBC AUTO: 94.3 FL (ref 79–97)
MONOCYTES # BLD AUTO: 1.41 10*3/MM3 (ref 0.1–0.9)
MONOCYTES NFR BLD AUTO: 13 % (ref 5–12)
NEUTROPHILS NFR BLD AUTO: 7.69 10*3/MM3 (ref 1.7–7)
NEUTROPHILS NFR BLD AUTO: 70.7 % (ref 42.7–76)
NRBC BLD AUTO-RTO: 0 /100 WBC (ref 0–0.2)
PHOSPHATE SERPL-MCNC: 5.2 MG/DL (ref 2.5–4.5)
PLATELET # BLD AUTO: 280 10*3/MM3 (ref 140–450)
PMV BLD AUTO: 9 FL (ref 6–12)
POTASSIUM SERPL-SCNC: 4.1 MMOL/L (ref 3.5–5.2)
RBC # BLD AUTO: 2.63 10*6/MM3 (ref 4.14–5.8)
SODIUM SERPL-SCNC: 140 MMOL/L (ref 136–145)
WBC NRBC COR # BLD AUTO: 10.87 10*3/MM3 (ref 3.4–10.8)

## 2024-04-20 PROCEDURE — 25010000002 HEPARIN (PORCINE) PER 1000 UNITS: Performed by: INTERNAL MEDICINE

## 2024-04-20 PROCEDURE — 85025 COMPLETE CBC W/AUTO DIFF WBC: CPT | Performed by: INTERNAL MEDICINE

## 2024-04-20 PROCEDURE — 94799 UNLISTED PULMONARY SVC/PX: CPT

## 2024-04-20 PROCEDURE — 99232 SBSQ HOSP IP/OBS MODERATE 35: CPT | Performed by: INTERNAL MEDICINE

## 2024-04-20 PROCEDURE — 94664 DEMO&/EVAL PT USE INHALER: CPT

## 2024-04-20 PROCEDURE — 80069 RENAL FUNCTION PANEL: CPT | Performed by: INTERNAL MEDICINE

## 2024-04-20 PROCEDURE — 25010000002 CEFEPIME PER 500 MG: Performed by: INTERNAL MEDICINE

## 2024-04-20 PROCEDURE — 94761 N-INVAS EAR/PLS OXIMETRY MLT: CPT

## 2024-04-20 RX ADMIN — Medication 10 ML: at 21:03

## 2024-04-20 RX ADMIN — PANTOPRAZOLE SODIUM 40 MG: 40 INJECTION, POWDER, FOR SOLUTION INTRAVENOUS at 21:02

## 2024-04-20 RX ADMIN — PANTOPRAZOLE SODIUM 40 MG: 40 INJECTION, POWDER, FOR SOLUTION INTRAVENOUS at 08:41

## 2024-04-20 RX ADMIN — TIOTROPIUM BROMIDE INHALATION SPRAY 2 PUFF: 3.12 SPRAY, METERED RESPIRATORY (INHALATION) at 09:25

## 2024-04-20 RX ADMIN — TAMSULOSIN HYDROCHLORIDE 0.4 MG: 0.4 CAPSULE ORAL at 08:42

## 2024-04-20 RX ADMIN — AMLODIPINE BESYLATE 5 MG: 5 TABLET ORAL at 08:42

## 2024-04-20 RX ADMIN — BUDESONIDE AND FORMOTEROL FUMARATE DIHYDRATE 2 PUFF: 160; 4.5 AEROSOL RESPIRATORY (INHALATION) at 09:25

## 2024-04-20 RX ADMIN — HEPARIN SODIUM 5000 UNITS: 5000 INJECTION INTRAVENOUS; SUBCUTANEOUS at 21:02

## 2024-04-20 RX ADMIN — CEFEPIME 2000 MG: 2 INJECTION, POWDER, FOR SOLUTION INTRAVENOUS at 08:41

## 2024-04-20 RX ADMIN — PRAVASTATIN SODIUM 80 MG: 40 TABLET ORAL at 21:02

## 2024-04-20 RX ADMIN — Medication 10 ML: at 08:42

## 2024-04-20 RX ADMIN — HEPARIN SODIUM 5000 UNITS: 5000 INJECTION INTRAVENOUS; SUBCUTANEOUS at 15:00

## 2024-04-20 RX ADMIN — BUDESONIDE AND FORMOTEROL FUMARATE DIHYDRATE 2 PUFF: 160; 4.5 AEROSOL RESPIRATORY (INHALATION) at 20:17

## 2024-04-20 RX ADMIN — Medication 1 PATCH: at 06:11

## 2024-04-20 RX ADMIN — HEPARIN SODIUM 5000 UNITS: 5000 INJECTION INTRAVENOUS; SUBCUTANEOUS at 06:11

## 2024-04-20 NOTE — PLAN OF CARE
Goal Outcome Evaluation:   VSS throughout shift. Creatinine trending down. Plan is to D/C Monday. IV antibiotics until Monday. Paced rhythm on monitor. Great urinary output. On RA. Will continue POC.

## 2024-04-20 NOTE — PROGRESS NOTES
"   LOS: 8 days    Patient Care Team:  Shahid Drake MD as PCP - General (Family Medicine)  Octaviano Sampson MD as Consulting Physician (Pulmonary Disease)  Neetu Ashley MD as Referring Physician (Hematology and Oncology)  Nimo Rodríguez MD as Consulting Physician (Radiation Oncology)    Subjective   Good urine output, creatinine down to 6.41  Other physicians note seen.  Overall getting better.  Review of system:  Denies any nausea vomiting chest pain or shortness of breath.    Objective     Vital Signs:  Blood pressure 133/77, pulse 70, temperature 97.9 °F (36.6 °C), temperature source Oral, resp. rate 18, height 182.9 cm (72\"), weight 85.6 kg (188 lb 12.8 oz), SpO2 96%.      Intake/Output Summary (Last 24 hours) at 4/20/2024 0857  Last data filed at 4/20/2024 0614  Gross per 24 hour   Intake --   Output 2950 ml   Net -2950 ml        04/19 0701 - 04/20 0700  In: -   Out: 3825 [Urine:3825]    Physical Exam:  General Appearance: Awake alert following commands no obvious distress.  Eyes: PER, EOMI.  Neck: Supple no JVD.  Lungs: Clear auscultation, no rales rhonchi's, equal chest movement, nonlabored.  Heart: No gallop, murmur, rub, RRR.  Abdomen: Soft, nontender, positive bowel sounds, no organomegaly.  Extremities: No lower extremity edema, no cyanosis.  Neuro: No focal deficit, moving all extremities, alert oriented X 3  Skin: Warm and dry.    Labs:  Results from last 7 days   Lab Units 04/20/24  0528 04/19/24  0323 04/18/24  0333   WBC 10*3/mm3 10.87* 10.31 10.44   HEMOGLOBIN g/dL 8.0* 7.6* 7.7*   PLATELETS 10*3/mm3 280 219 181     Results from last 7 days   Lab Units 04/20/24  0528 04/19/24  0323 04/18/24  0333 04/17/24  0528 04/16/24  0428 04/15/24  0416 04/14/24  0603   SODIUM mmol/L 140 139 137 135*   < > 136 133*   POTASSIUM mmol/L 4.1 4.2 4.0 4.2   < > 4.5 4.4   CHLORIDE mmol/L 107 110* 105 102   < > 104 101   CO2 mmol/L 19.0* 17.0* 19.0* 19.0*   < > 21.0* 20.0*   BUN mg/dL 38* 45* 54* 53*   < > 49* " 39*   CREATININE mg/dL 6.41* 7.29* 8.45* 8.37*   < > 6.14* 4.58*   CALCIUM mg/dL 8.3* 8.0* 8.2* 7.9*   < > 7.7* 7.7*   PHOSPHORUS mg/dL 5.2* 4.4 4.5  --   --  3.4 2.8   MAGNESIUM mg/dL  --   --   --   --   --  2.5* 2.6*   ALBUMIN g/dL 2.9* 2.8* 2.4*  --   --   --  2.5*    < > = values in this interval not displayed.     Results from last 7 days   Lab Units 04/14/24  0603   ALK PHOS U/L 61   BILIRUBIN mg/dL <0.2   ALT (SGPT) U/L 9   AST (SGOT) U/L 15          Estimated Creatinine Clearance: 12.1 mL/min (A) (by C-G formula based on SCr of 6.41 mg/dL (H)).         A/P:    1.  ARF: Renal function improving, creatinine down to 6.41 nonoliguric.  Baseline cr ~ 0.8mg/dl. Cr on this admission 2.2-2.7mg/dl. UA large blood, trace protein, large aspen esterase many wbc++. CT showed perinephric fat stranding consistent with pyelonephritis. Urine na 74 urine cl 16.  Patient likely with tubulointerstitial inflammation in the setting of infection with component of ATN due to hemodynamic instability on ACEI I with nausea, vomiting and poor po intake.. 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  Urine eos negative.  Doubt reaction due to antibiotics.      2.  Hypertension: Patient initially septic with pyelonephritis.  Improved with volume resuscitation on IVF.  Continues off all blood pressure medications..  RAAS suppression on hold with ARF.  Cover with as needed medications for now.    3.  Hyponatremia: Improving overnight.  Monitor for now.  All fluids isotonic.    4.  Hyperkalemia: Resolved.  Potassium initially up to 5.7 improved to normal range post Lokelma.  RAAS suppression on hold..    5.  Metabolic acidosis: Stable.  Bicarb initially quite low at 16 with an underlying lactic acidemia due to poor perfusion.  Bicarb improved with perfusion support.  Would monitor for now.    6.  Anemia: Hemoglobin below goal.  Transfuse as indicated for Hgb <7.  Patient with possible GI bleed.  Recent black stools.   Workup underway.    7.  Oral DM: Urine output increasing.  For now continue strict I's and O's.    8.  Small cell CA: Last treatment with Opdivo 4/4/2024.  Follows with Dr. Gely Ashley.    Renal function continues to improve no need for dialysis or kidney biopsy at this time.  Continue to monitor, if the renal function further gets better over the next few days he will be discharged and followed outpatient.      Alok Dixon MD  04/20/24  08:57 EDT

## 2024-04-20 NOTE — PROGRESS NOTES
[facility]  PROGRESS NOTE    Patient Name: David Barfield  : 1950  MRN: [id number]    Date of Admission: 2024  Primary Care Physician: Shahid Drake MD    Subjective   Subjective     CC:  Follow-up sepsis    HPI:  Patient resting in bed, no changes overnight.    Objective   Objective     Vital Signs:   Temp:  [97.4 °F (36.3 °C)-98.2 °F (36.8 °C)] 97.9 °F (36.6 °C)  Heart Rate:  [70-71] 71  Resp:  [16-18] 18  BP: (124-136)/(67-77) 133/77  Flow (L/min):  [2] 2     Physical Exam:  Constitutional: No acute distress, awake, alert  HENT: NCAT, mucous membranes moist  Respiratory: Clear to auscultation bilaterally, respiratory effort normal   Cardiovascular: RRR, no murmurs, rubs, or gallops  Gastrointestinal: soft, nontender, nondistended  Musculoskeletal: No bilateral ankle edema  Psychiatric: Appropriate affect, cooperative  Neurologic: Oriented x 3, speech clear, no focal deficits  Skin: No rashes    No changes         Results Reviewed:  LAB RESULTS:      Lab 24  0528 24  0323 24  0333 24  0528 24  0428 04/15/24  1137 04/15/24  0416 24  0603 24  2038 24  1611   WBC 10.87* 10.31 10.44 10.50 11.91*  --  16.58*   < >  --   --    HEMOGLOBIN 8.0* 7.6* 7.7* 8.4* 8.0*   < > 8.1*   < >  --   --    HEMATOCRIT 24.8* 23.4* 23.2* 25.0* 24.1*   < > 24.3*   < >  --   --    PLATELETS 280 219 181 185 179  --  171   < >  --   --    NEUTROS ABS 7.69* 7.36* 7.52* 7.90*  --   --  14.09*   < >  --   --    IMMATURE GRANS (ABS) 0.12* 0.13* 0.08* 0.06*  --   --  0.11*   < >  --   --    LYMPHS ABS 1.33 1.35 1.31 1.09  --   --  1.00   < >  --   --    MONOS ABS 1.41* 1.23* 1.28* 1.19*  --   --  1.20*   < >  --   --    EOS ABS 0.28 0.21 0.23 0.24  --   --  0.16   < >  --   --    MCV 94.3 93.2 91.3 90.9 92.0  --  92.0   < >  --   --    LACTATE  --   --   --   --   --   --   --   --  1.6 2.5*    < > = values in this interval not  displayed.         Lab 04/20/24 0528 04/19/24 0323 04/18/24 0333 04/17/24 0528 04/16/24  0428 04/15/24  0416 04/14/24  0603   SODIUM 140 139 137 135* 135* 136 133*   POTASSIUM 4.1 4.2 4.0 4.2 4.5 4.5 4.4   CHLORIDE 107 110* 105 102 103 104 101   CO2 19.0* 17.0* 19.0* 19.0* 21.0* 21.0* 20.0*   ANION GAP 14.0 12.0 13.0 14.0 11.0 11.0 12.0   BUN 38* 45* 54* 53* 54* 49* 39*   CREATININE 6.41* 7.29* 8.45* 8.37* 7.03* 6.14* 4.58*   EGFR 8.4* 7.2* 6.1* 6.1* 7.6* 8.9* 12.6*   GLUCOSE 97 103* 105* 98 98 102* 95   CALCIUM 8.3* 8.0* 8.2* 7.9* 8.0* 7.7* 7.7*   MAGNESIUM  --   --   --   --   --  2.5* 2.6*   PHOSPHORUS 5.2* 4.4 4.5  --   --  3.4 2.8         Lab 04/20/24 0528 04/19/24 0323 04/18/24  0333 04/14/24  0603   TOTAL PROTEIN  --   --   --  6.0   ALBUMIN 2.9* 2.8* 2.4* 2.5*   GLOBULIN  --   --   --  3.5   ALT (SGPT)  --   --   --  9   AST (SGOT)  --   --   --  15   BILIRUBIN  --   --   --  <0.2   ALK PHOS  --   --   --  61                       Brief Urine Lab Results  (Last result in the past 365 days)        Color   Clarity   Blood   Leuk Est   Nitrite   Protein   CREAT   Urine HCG        04/13/24 1632             38.1                 Microbiology Results Abnormal       Procedure Component Value - Date/Time    Blood Culture - Blood, Wrist, Left [[id number]]  (Normal) Collected: 04/12/24 2009    Lab Status: Final result Specimen: Blood from Wrist, Left Updated: 04/17/24 2100     Blood Culture No growth at 5 days    Blood Culture - Blood, Arm, Right [[id number]]  (Normal) Collected: 04/12/24 2009    Lab Status: Final result Specimen: Blood from Arm, Right Updated: 04/17/24 2100     Blood Culture No growth at 5 days    Urine Culture - Urine, Straight Cath [[id number]]  (Normal) Collected: 04/12/24 2247    Lab Status: Final result Specimen: Urine from Straight Cath Updated: 04/14/24 1206     Urine Culture No growth    Gastrointestinal Panel, PCR - Stool, Per Rectum [[id number]]  (Normal) Collected: 04/14/24 0709    Lab  Status: Final result Specimen: Stool from Per Rectum Updated: 04/14/24 0908     Campylobacter Not Detected     Plesiomonas shigelloides Not Detected     Salmonella Not Detected     Vibrio Not Detected     Vibrio cholerae Not Detected     Yersinia enterocolitica Not Detected     Enteroaggregative E. coli (EAEC) Not Detected     Enteropathogenic E. coli (EPEC) Not Detected     Enterotoxigenic E. coli (ETEC) lt/st Not Detected     Shiga-like toxin-producing E. coli (STEC) stx1/stx2 Not Detected     Shigella/Enteroinvasive E. coli (EIEC) Not Detected     Cryptosporidium Not Detected     Cyclospora cayetanensis Not Detected     Entamoeba histolytica Not Detected     Giardia lamblia Not Detected     Adenovirus F40/41 Not Detected     Astrovirus Not Detected     Norovirus GI/GII Not Detected     Rotavirus A Not Detected     Sapovirus (I, II, IV or V) Not Detected    Clostridioides difficile Toxin - Stool, Per Rectum [[id number]]  (Normal) Collected: 04/14/24 0709    Lab Status: Final result Specimen: Stool from Per Rectum Updated: 04/14/24 0817    Narrative:      The following orders were created for panel order Clostridioides difficile Toxin - Stool, Per Rectum.  Procedure                               Abnormality         Status                     ---------                               -----------         ------                     Clostridioides difficile...[[id number]]  Normal              Final result                 Please view results for these tests on the individual orders.    Clostridioides difficile Toxin, PCR - Stool, Per Rectum [[id number]]  (Normal) Collected: 04/14/24 0709    Lab Status: Final result Specimen: Stool from Per Rectum Updated: 04/14/24 0817     Toxigenic C. difficile by PCR Not Detected    Narrative:      The result indicates the absence of toxigenic C. difficile from stool specimen.     Eosinophil Smear - Urine, Urine, Clean Catch [[id number]]  (Normal) Collected: 04/13/24 1631    Lab Status:  Final result Specimen: Urine, Clean Catch Updated: 04/13/24 1732     Eosinophil Smear 0 % EOS/100 Cells     Narrative:      No eosinophil seen    MRSA Screen, PCR (Inpatient) - Swab, Nares [[id number]]  (Normal) Collected: 04/12/24 2128    Lab Status: Final result Specimen: Swab from Nares Updated: 04/13/24 0803     MRSA PCR Negative    Narrative:      The negative predictive value of this diagnostic test is high and should only be used to consider de-escalating anti-MRSA therapy. A positive result may indicate colonization with MRSA and must be correlated clinically.  MRSA Negative    COVID PRE-OP / PRE-PROCEDURE SCREENING ORDER (NO ISOLATION) - Swab, Nasopharynx [[id number]]  (Normal) Collected: 04/12/24 2010    Lab Status: Final result Specimen: Swab from Nasopharynx Updated: 04/12/24 2121    Narrative:      The following orders were created for panel order COVID PRE-OP / PRE-PROCEDURE SCREENING ORDER (NO ISOLATION) - Swab, Nasopharynx.  Procedure                               Abnormality         Status                     ---------                               -----------         ------                     Respiratory Panel PCR w/...[[id number]]  Normal              Final result                 Please view results for these tests on the individual orders.    Respiratory Panel PCR w/COVID-19(SARS-CoV-2) [facility] In-House, NP Swab in UTM/VTM, 2 HR TAT - Swab, Nasopharynx [[id number]]  (Normal) Collected: 04/12/24 2010    Lab Status: Final result Specimen: Swab from Nasopharynx Updated: 04/12/24 2121     ADENOVIRUS, PCR Not Detected     Coronavirus 229E Not Detected     Coronavirus HKU1 Not Detected     Coronavirus NL63 Not Detected     Coronavirus OC43 Not Detected     COVID19 Not Detected     Human Metapneumovirus Not Detected     Human Rhinovirus/Enterovirus Not Detected     Influenza A PCR Not Detected     Influenza B PCR Not Detected     Parainfluenza Virus 1 Not Detected     Parainfluenza Virus 2  Not Detected     Parainfluenza Virus 3 Not Detected     Parainfluenza Virus 4 Not Detected     RSV, PCR Not Detected     Bordetella pertussis pcr Not Detected     Bordetella parapertussis PCR Not Detected     Chlamydophila pneumoniae PCR Not Detected     Mycoplasma pneumo by PCR Not Detected    Narrative:      In the setting of a positive respiratory panel with a viral infection PLUS a negative procalcitonin without other underlying concern for bacterial infection, consider observing off antibiotics or discontinuation of antibiotics and continue supportive care. If the respiratory panel is positive for atypical bacterial infection (Bordetella pertussis, Chlamydophila pneumoniae, or Mycoplasma pneumoniae), consider antibiotic de-escalation to target atypical bacterial infection.            No radiology results from the last 24 hrs    Results for orders placed during the hospital encounter of 07/09/21    Adult Transthoracic Echo Complete W/ Cont if Necessary Per Protocol    Interpretation Summary  · Estimated left ventricular EF = 55% Left ventricular systolic function is normal.  · Left ventricular diastolic function was normal.  · Left ventricular wall thickness is consistent with mild concentric hypertrophy.  · Trace mitral and tricuspid regurgitation.      Current medications:  Scheduled Meds:amLODIPine, 5 mg, Oral, Q24H  budesonide-formoterol, 2 puff, Inhalation, BID - RT   And  tiotropium bromide monohydrate, 2 puff, Inhalation, Daily - RT  cefepime, 2,000 mg, Intravenous, Daily  heparin (porcine), 5,000 Units, Subcutaneous, Q8H  nicotine, 1 patch, Transdermal, Q24H  [Held by provider] pantoprazole, 40 mg, Oral, Q AM  pantoprazole, 40 mg, Intravenous, Q12H  pravastatin, 80 mg, Oral, Nightly  sodium chloride, 10 mL, Intravenous, Q12H  tamsulosin, 0.4 mg, Oral, Daily      Continuous Infusions:       PRN Meds:.  acetaminophen **OR** acetaminophen **OR** acetaminophen    Calcium Replacement - Follow Nurse / BPA  Driven Protocol    hydrALAZINE    HYDROcodone-acetaminophen    Magnesium Standard Dose Replacement - Follow Nurse / BPA Driven Protocol    nicotine polacrilex    ondansetron    ondansetron ODT **OR** ondansetron    prochlorperazine    sodium chloride    sodium chloride    sodium chloride    Assessment & Plan   Assessment & Plan     Active Hospital Problems    Diagnosis  POA    **Sepsis [A41.9]  Yes    Severe malnutrition [E43]  Yes    ARACELI (acute kidney injury) [N17.9]  Unknown    Hypokalemia [E87.6]  Unknown    Acute pyelonephritis [N10]  Unknown    Primary hypertension [I10]  Yes    Centrilobular emphysema [J43.2]  Yes    Tobacco abuse [Z72.0]  Yes    Presence of cardiac pacemaker [Z95.0]  Yes    Mixed hyperlipidemia [E78.2]  Yes      Resolved Hospital Problems   No resolved problems to display.        Brief Hospital Course to date:  David Barfield is a 74 y.o. male with a PMH significant for non-small cell lung cancer s/p neoadjuvant chemoimmunotherapy and RLL lobectomy (1/2024) currently on Opdivo (last tx 4/4/24), tobacco use disorder, HTN, emphysema who resented to the ER due to nausea, vomiting, diarrhea.  Found to have severe sepsis secondary to acute pyelonephritis in immunosuppressed patient.    Severe sepsis - POA w/tachycardia, lactic acidosis, elevated procal, leukocytosis, hypotension  Acute pyelonephritis  Immunosuppressed host  - Continue cefepime, ID following  - C. difficile and GI panel negative  -- Blood cultures NGTD; urine culture negative, but imaging and presentation consistent with pyelonephritis, continue abx per ID  - Lactic acid normalized with IVF     Olguric ARACELI, POA - worsening  --Baseline creatinine 0.94-1.1, now downtrending  --still with decent UOP  --Failed Lasix trial on 4/14  --Nephrology following, does not appear he will need HD, Creatinine improving, hold on renal bx    Possible GI bleed  Chronic Anemia  --Hemoglobin stable, given significant IVF, likely dilutional  component  --PPI on hold due to renal fxn  --transfuse PRN Hgb <7 or symptomatic anemia  --suspect Anemia of CD, can f/u outpatient w/Dr. Ashley for further work-up/management as indicated     Non-small cell lung cancer  - Follows with Dr. Gely Ashley  - Last treatment with Opdivo 4/4/2024     Hypomagnesemia - resolved  - Replace per protocol    Hyperkalemia - resolved  --S/p lokelma      Hypertension  - Hold home lisinopril due to hypotension and ARACELI  - started norvasc w/improvement    Expected Discharge Location and Transportation: TBD, hopefully in next 1-2 days if continued renal replacement  Expected Discharge   Expected Discharge Date: 4/17/2024; Expected Discharge Time:      DVT prophylaxis:  Medical DVT prophylaxis orders are present.         AM-PAC 6 Clicks Score (PT): 20 (04/19/24 1137)    CODE STATUS:   Code Status and Medical Interventions:   Ordered at: 04/13/24 0017     Level Of Support Discussed With:    Patient     Code Status (Patient has no pulse and is not breathing):    CPR (Attempt to Resuscitate)     Medical Interventions (Patient has pulse or is breathing):    Full Support       Dionne Samaniego, DO  04/20/24

## 2024-04-21 LAB
ALBUMIN SERPL-MCNC: 2.9 G/DL (ref 3.5–5.2)
ANION GAP SERPL CALCULATED.3IONS-SCNC: 15 MMOL/L (ref 5–15)
BUN SERPL-MCNC: 32 MG/DL (ref 8–23)
BUN/CREAT SERPL: 6.1 (ref 7–25)
CALCIUM SPEC-SCNC: 8.5 MG/DL (ref 8.6–10.5)
CHLORIDE SERPL-SCNC: 109 MMOL/L (ref 98–107)
CO2 SERPL-SCNC: 17 MMOL/L (ref 22–29)
CREAT SERPL-MCNC: 5.22 MG/DL (ref 0.76–1.27)
EGFRCR SERPLBLD CKD-EPI 2021: 10.8 ML/MIN/1.73
GLUCOSE SERPL-MCNC: 93 MG/DL (ref 65–99)
PHOSPHATE SERPL-MCNC: 4.8 MG/DL (ref 2.5–4.5)
POTASSIUM SERPL-SCNC: 4 MMOL/L (ref 3.5–5.2)
SODIUM SERPL-SCNC: 141 MMOL/L (ref 136–145)

## 2024-04-21 PROCEDURE — 25010000002 HEPARIN (PORCINE) PER 1000 UNITS: Performed by: INTERNAL MEDICINE

## 2024-04-21 PROCEDURE — 80069 RENAL FUNCTION PANEL: CPT | Performed by: INTERNAL MEDICINE

## 2024-04-21 PROCEDURE — 25010000002 CEFEPIME PER 500 MG: Performed by: INTERNAL MEDICINE

## 2024-04-21 PROCEDURE — 94799 UNLISTED PULMONARY SVC/PX: CPT

## 2024-04-21 PROCEDURE — 94664 DEMO&/EVAL PT USE INHALER: CPT

## 2024-04-21 PROCEDURE — 99232 SBSQ HOSP IP/OBS MODERATE 35: CPT | Performed by: INTERNAL MEDICINE

## 2024-04-21 PROCEDURE — 94761 N-INVAS EAR/PLS OXIMETRY MLT: CPT

## 2024-04-21 RX ADMIN — AMLODIPINE BESYLATE 5 MG: 5 TABLET ORAL at 08:54

## 2024-04-21 RX ADMIN — HEPARIN SODIUM 5000 UNITS: 5000 INJECTION INTRAVENOUS; SUBCUTANEOUS at 14:07

## 2024-04-21 RX ADMIN — HEPARIN SODIUM 5000 UNITS: 5000 INJECTION INTRAVENOUS; SUBCUTANEOUS at 21:48

## 2024-04-21 RX ADMIN — BUDESONIDE AND FORMOTEROL FUMARATE DIHYDRATE 2 PUFF: 160; 4.5 AEROSOL RESPIRATORY (INHALATION) at 21:57

## 2024-04-21 RX ADMIN — HEPARIN SODIUM 5000 UNITS: 5000 INJECTION INTRAVENOUS; SUBCUTANEOUS at 06:28

## 2024-04-21 RX ADMIN — PANTOPRAZOLE SODIUM 40 MG: 40 INJECTION, POWDER, FOR SOLUTION INTRAVENOUS at 21:48

## 2024-04-21 RX ADMIN — TAMSULOSIN HYDROCHLORIDE 0.4 MG: 0.4 CAPSULE ORAL at 08:54

## 2024-04-21 RX ADMIN — TIOTROPIUM BROMIDE INHALATION SPRAY 2 PUFF: 3.12 SPRAY, METERED RESPIRATORY (INHALATION) at 09:18

## 2024-04-21 RX ADMIN — Medication 1 PATCH: at 06:30

## 2024-04-21 RX ADMIN — PANTOPRAZOLE SODIUM 40 MG: 40 INJECTION, POWDER, FOR SOLUTION INTRAVENOUS at 08:55

## 2024-04-21 RX ADMIN — BUDESONIDE AND FORMOTEROL FUMARATE DIHYDRATE 2 PUFF: 160; 4.5 AEROSOL RESPIRATORY (INHALATION) at 09:18

## 2024-04-21 RX ADMIN — PRAVASTATIN SODIUM 80 MG: 40 TABLET ORAL at 21:47

## 2024-04-21 RX ADMIN — CEFEPIME 2000 MG: 2 INJECTION, POWDER, FOR SOLUTION INTRAVENOUS at 08:55

## 2024-04-21 RX ADMIN — Medication 10 ML: at 09:05

## 2024-04-21 NOTE — PLAN OF CARE
Goal Outcome Evaluation:     Creatinine has improved, currently is at 5.22, VS's stable, A/Ox4, plans to d/c tomorrow.

## 2024-04-21 NOTE — PROGRESS NOTES
"   LOS: 9 days    Patient Care Team:  Shahid Drake MD as PCP - General (Family Medicine)  Octaviano Sampson MD as Consulting Physician (Pulmonary Disease)  Neetu Ashley MD as Referring Physician (Hematology and Oncology)  Nimo Rodríguez MD as Consulting Physician (Radiation Oncology)    Subjective   Good urine output creatinine down to 5.22 from 6.4.  No other issues  Review of system:  Denies nausea vomiting, chest pain or shortness of breath.    Objective     Vital Signs:  Blood pressure 138/80, pulse 73, temperature 97.7 °F (36.5 °C), temperature source Oral, resp. rate 18, height 182.9 cm (72\"), weight 85.6 kg (188 lb 12.8 oz), SpO2 98%.      Intake/Output Summary (Last 24 hours) at 4/21/2024 0835  Last data filed at 4/21/2024 0545  Gross per 24 hour   Intake --   Output 3100 ml   Net -3100 ml        04/20 0701 - 04/21 0700  In: -   Out: 3100 [Urine:3100]    Physical Exam:  General Appearance:  male awake alert.  Neck: Supple no JVD.  Lungs: Clear auscultation, no rales rhonchi's, equal chest movement, nonlabored.  Heart: No gallop, murmur, rub, RRR.  Abdomen: Soft, nontender, positive bowel sounds, no organomegaly.  Extremities: No lower extremity edema, no cyanosis.  Neuro: No focal deficit, moving all extremities, alert oriented X 3  Skin: Warm and dry.    Labs:  Results from last 7 days   Lab Units 04/20/24  0528 04/19/24  0323 04/18/24  0333   WBC 10*3/mm3 10.87* 10.31 10.44   HEMOGLOBIN g/dL 8.0* 7.6* 7.7*   PLATELETS 10*3/mm3 280 219 181     Results from last 7 days   Lab Units 04/21/24  0403 04/20/24  0528 04/19/24  0323 04/18/24  0333 04/16/24  0428 04/15/24  0416   SODIUM mmol/L 141 140 139 137   < > 136   POTASSIUM mmol/L 4.0 4.1 4.2 4.0   < > 4.5   CHLORIDE mmol/L 109* 107 110* 105   < > 104   CO2 mmol/L 17.0* 19.0* 17.0* 19.0*   < > 21.0*   BUN mg/dL 32* 38* 45* 54*   < > 49*   CREATININE mg/dL 5.22* 6.41* 7.29* 8.45*   < > 6.14*   CALCIUM mg/dL 8.5* 8.3* 8.0* 8.2*   < > 7.7* "   PHOSPHORUS mg/dL 4.8* 5.2* 4.4 4.5  --  3.4   MAGNESIUM mg/dL  --   --   --   --   --  2.5*   ALBUMIN g/dL 2.9* 2.9* 2.8* 2.4*  --   --     < > = values in this interval not displayed.                Estimated Creatinine Clearance: 14.8 mL/min (A) (by C-G formula based on SCr of 5.22 mg/dL (H)).         A/P:    1.  ARF: Creatinine improved 5.2 to.  Baseline cr ~ 0.8mg/dl. Cr on this admission 2.2-2.7mg/dl. UA large blood, trace protein, large aspen esterase many wbc++. CT showed perinephric fat stranding consistent with pyelonephritis. Urine na 74 urine cl 16.  Patient likely with tubulointerstitial inflammation in the setting of infection with component of ATN due to hemodynamic instability on ACEI I with nausea, vomiting and poor po intake.. 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  Urine eos negative.  Doubt reaction due to antibiotics.      2.  Hypertension: Patient initially septic with pyelonephritis.  Improved with volume resuscitation on IVF.  Continues off all blood pressure medications..  RAAS suppression on hold with ARF.  Cover with as needed medications for now.    3.  Hyponatremia: Improving overnight.  Monitor for now.  All fluids isotonic.    4.  Hyperkalemia: Resolved.  Potassium initially up to 5.7 improved to normal range post Lokelma.  RAAS suppression on hold..    5.  Metabolic acidosis: Stable.  Bicarb initially quite low at 16 with an underlying lactic acidemia due to poor perfusion.  Bicarb improved with perfusion support.  Would monitor for now.    6.  Anemia: Hemoglobin below goal.  Transfuse as indicated for Hgb <7.  Patient with possible GI bleed.  Recent black stools.  Workup underway.    7.  Oral DM: Urine output increasing.  For now continue strict I's and O's.    8.  Small cell CA: Last treatment with Opdivo 4/4/2024.  Follows with Dr. Gely Ashley.    Renal function improving.  No indication for dialysis or biopsy at this time.  Continue to monitor, home in 1  to 2 days      Alok Dixon MD  04/21/24  08:35 EDT

## 2024-04-21 NOTE — PROGRESS NOTES
[facility]  PROGRESS NOTE    Patient Name: David Barfield  : 1950  MRN: [id number]    Date of Admission: 2024  Primary Care Physician: Shahid Drake MD    Subjective   Subjective     CC:  Follow-up sepsis    HPI:  Patient resting in bed, no acute issues overnight.    Objective   Objective     Vital Signs:   Temp:  [97.3 °F (36.3 °C)-98 °F (36.7 °C)] 97.3 °F (36.3 °C)  Heart Rate:  [70-77] 71  Resp:  [18] 18  BP: (129-144)/(66-82) 144/77  Flow (L/min):  [2] 2     Physical Exam:  Constitutional: No acute distress, awake, alert  HENT: NCAT, mucous membranes moist  Respiratory: Clear to auscultation bilaterally, respiratory effort normal   Cardiovascular: RRR, no murmurs, rubs, or gallops  Gastrointestinal: soft, nontender, nondistended  Musculoskeletal: No bilateral ankle edema  Psychiatric: Appropriate affect, cooperative  Neurologic: Oriented x 3, speech clear, no focal deficits  Skin: No rashes    No changes         Results Reviewed:  LAB RESULTS:      Lab 24  0528 24  0323 24  0333 24  0528 24  0428 04/15/24  1137 04/15/24  0416   WBC 10.87* 10.31 10.44 10.50 11.91*  --  16.58*   HEMOGLOBIN 8.0* 7.6* 7.7* 8.4* 8.0*   < > 8.1*   HEMATOCRIT 24.8* 23.4* 23.2* 25.0* 24.1*   < > 24.3*   PLATELETS 280 219 181 185 179  --  171   NEUTROS ABS 7.69* 7.36* 7.52* 7.90*  --   --  14.09*   IMMATURE GRANS (ABS) 0.12* 0.13* 0.08* 0.06*  --   --  0.11*   LYMPHS ABS 1.33 1.35 1.31 1.09  --   --  1.00   MONOS ABS 1.41* 1.23* 1.28* 1.19*  --   --  1.20*   EOS ABS 0.28 0.21 0.23 0.24  --   --  0.16   MCV 94.3 93.2 91.3 90.9 92.0  --  92.0    < > = values in this interval not displayed.         Lab 24  0403 24  0528 24  0323 24  0333 24  0528 24  0428 04/15/24  0416   SODIUM 141 140 139 137 135*   < > 136   POTASSIUM 4.0 4.1 4.2 4.0 4.2   < > 4.5   CHLORIDE 109* 107 110* 105 102   < > 104   CO2 17.0* 19.0*  17.0* 19.0* 19.0*   < > 21.0*   ANION GAP 15.0 14.0 12.0 13.0 14.0   < > 11.0   BUN 32* 38* 45* 54* 53*   < > 49*   CREATININE 5.22* 6.41* 7.29* 8.45* 8.37*   < > 6.14*   EGFR 10.8* 8.4* 7.2* 6.1* 6.1*   < > 8.9*   GLUCOSE 93 97 103* 105* 98   < > 102*   CALCIUM 8.5* 8.3* 8.0* 8.2* 7.9*   < > 7.7*   MAGNESIUM  --   --   --   --   --   --  2.5*   PHOSPHORUS 4.8* 5.2* 4.4 4.5  --   --  3.4    < > = values in this interval not displayed.         Lab 04/21/24  0403 04/20/24  0528 04/19/24  0323 04/18/24  0333   ALBUMIN 2.9* 2.9* 2.8* 2.4*                       Brief Urine Lab Results  (Last result in the past 365 days)        Color   Clarity   Blood   Leuk Est   Nitrite   Protein   CREAT   Urine HCG        04/13/24 1632             38.1                 Microbiology Results Abnormal       Procedure Component Value - Date/Time    Blood Culture - Blood, Wrist, Left [[id number]]  (Normal) Collected: 04/12/24 2009    Lab Status: Final result Specimen: Blood from Wrist, Left Updated: 04/17/24 2100     Blood Culture No growth at 5 days    Blood Culture - Blood, Arm, Right [[id number]]  (Normal) Collected: 04/12/24 2009    Lab Status: Final result Specimen: Blood from Arm, Right Updated: 04/17/24 2100     Blood Culture No growth at 5 days    Urine Culture - Urine, Straight Cath [[id number]]  (Normal) Collected: 04/12/24 2247    Lab Status: Final result Specimen: Urine from Straight Cath Updated: 04/14/24 1206     Urine Culture No growth    Gastrointestinal Panel, PCR - Stool, Per Rectum [[id number]]  (Normal) Collected: 04/14/24 0709    Lab Status: Final result Specimen: Stool from Per Rectum Updated: 04/14/24 0908     Campylobacter Not Detected     Plesiomonas shigelloides Not Detected     Salmonella Not Detected     Vibrio Not Detected     Vibrio cholerae Not Detected     Yersinia enterocolitica Not Detected     Enteroaggregative E. coli (EAEC) Not Detected     Enteropathogenic E. coli (EPEC) Not Detected     Enterotoxigenic E.  coli (ETEC) lt/st Not Detected     Shiga-like toxin-producing E. coli (STEC) stx1/stx2 Not Detected     Shigella/Enteroinvasive E. coli (EIEC) Not Detected     Cryptosporidium Not Detected     Cyclospora cayetanensis Not Detected     Entamoeba histolytica Not Detected     Giardia lamblia Not Detected     Adenovirus F40/41 Not Detected     Astrovirus Not Detected     Norovirus GI/GII Not Detected     Rotavirus A Not Detected     Sapovirus (I, II, IV or V) Not Detected    Clostridioides difficile Toxin - Stool, Per Rectum [[id number]]  (Normal) Collected: 04/14/24 0709    Lab Status: Final result Specimen: Stool from Per Rectum Updated: 04/14/24 0817    Narrative:      The following orders were created for panel order Clostridioides difficile Toxin - Stool, Per Rectum.  Procedure                               Abnormality         Status                     ---------                               -----------         ------                     Clostridioides difficile...[[id number]]  Normal              Final result                 Please view results for these tests on the individual orders.    Clostridioides difficile Toxin, PCR - Stool, Per Rectum [[id number]]  (Normal) Collected: 04/14/24 0709    Lab Status: Final result Specimen: Stool from Per Rectum Updated: 04/14/24 0817     Toxigenic C. difficile by PCR Not Detected    Narrative:      The result indicates the absence of toxigenic C. difficile from stool specimen.     Eosinophil Smear - Urine, Urine, Clean Catch [[id number]]  (Normal) Collected: 04/13/24 1631    Lab Status: Final result Specimen: Urine, Clean Catch Updated: 04/13/24 1732     Eosinophil Smear 0 % EOS/100 Cells     Narrative:      No eosinophil seen    MRSA Screen, PCR (Inpatient) - Swab, Nares [[id number]]  (Normal) Collected: 04/12/24 2128    Lab Status: Final result Specimen: Swab from Nares Updated: 04/13/24 0803     MRSA PCR Negative    Narrative:      The negative predictive value of this  diagnostic test is high and should only be used to consider de-escalating anti-MRSA therapy. A positive result may indicate colonization with MRSA and must be correlated clinically.  MRSA Negative    COVID PRE-OP / PRE-PROCEDURE SCREENING ORDER (NO ISOLATION) - Swab, Nasopharynx [[id number]]  (Normal) Collected: 04/12/24 2010    Lab Status: Final result Specimen: Swab from Nasopharynx Updated: 04/12/24 2121    Narrative:      The following orders were created for panel order COVID PRE-OP / PRE-PROCEDURE SCREENING ORDER (NO ISOLATION) - Swab, Nasopharynx.  Procedure                               Abnormality         Status                     ---------                               -----------         ------                     Respiratory Panel PCR w/...[[id number]]  Normal              Final result                 Please view results for these tests on the individual orders.    Respiratory Panel PCR w/COVID-19(SARS-CoV-2) [facility] In-House, NP Swab in UTM/VTM, 2 HR TAT - Swab, Nasopharynx [[id number]]  (Normal) Collected: 04/12/24 2010    Lab Status: Final result Specimen: Swab from Nasopharynx Updated: 04/12/24 2121     ADENOVIRUS, PCR Not Detected     Coronavirus 229E Not Detected     Coronavirus HKU1 Not Detected     Coronavirus NL63 Not Detected     Coronavirus OC43 Not Detected     COVID19 Not Detected     Human Metapneumovirus Not Detected     Human Rhinovirus/Enterovirus Not Detected     Influenza A PCR Not Detected     Influenza B PCR Not Detected     Parainfluenza Virus 1 Not Detected     Parainfluenza Virus 2 Not Detected     Parainfluenza Virus 3 Not Detected     Parainfluenza Virus 4 Not Detected     RSV, PCR Not Detected     Bordetella pertussis pcr Not Detected     Bordetella parapertussis PCR Not Detected     Chlamydophila pneumoniae PCR Not Detected     Mycoplasma pneumo by PCR Not Detected    Narrative:      In the setting of a positive respiratory panel with a viral infection PLUS a  negative procalcitonin without other underlying concern for bacterial infection, consider observing off antibiotics or discontinuation of antibiotics and continue supportive care. If the respiratory panel is positive for atypical bacterial infection (Bordetella pertussis, Chlamydophila pneumoniae, or Mycoplasma pneumoniae), consider antibiotic de-escalation to target atypical bacterial infection.            No radiology results from the last 24 hrs    Results for orders placed during the hospital encounter of 07/09/21    Adult Transthoracic Echo Complete W/ Cont if Necessary Per Protocol    Interpretation Summary  · Estimated left ventricular EF = 55% Left ventricular systolic function is normal.  · Left ventricular diastolic function was normal.  · Left ventricular wall thickness is consistent with mild concentric hypertrophy.  · Trace mitral and tricuspid regurgitation.      Current medications:  Scheduled Meds:amLODIPine, 5 mg, Oral, Q24H  budesonide-formoterol, 2 puff, Inhalation, BID - RT   And  tiotropium bromide monohydrate, 2 puff, Inhalation, Daily - RT  cefepime, 2,000 mg, Intravenous, Daily  heparin (porcine), 5,000 Units, Subcutaneous, Q8H  nicotine, 1 patch, Transdermal, Q24H  [Held by provider] pantoprazole, 40 mg, Oral, Q AM  pantoprazole, 40 mg, Intravenous, Q12H  pravastatin, 80 mg, Oral, Nightly  sodium chloride, 10 mL, Intravenous, Q12H  tamsulosin, 0.4 mg, Oral, Daily      Continuous Infusions:       PRN Meds:.  acetaminophen **OR** acetaminophen **OR** acetaminophen    Calcium Replacement - Follow Nurse / BPA Driven Protocol    hydrALAZINE    HYDROcodone-acetaminophen    Magnesium Standard Dose Replacement - Follow Nurse / BPA Driven Protocol    nicotine polacrilex    ondansetron    ondansetron ODT **OR** ondansetron    prochlorperazine    sodium chloride    sodium chloride    sodium chloride    Assessment & Plan   Assessment & Plan     Active Hospital Problems    Diagnosis  POA    **Sepsis  [A41.9]  Yes    Severe malnutrition [E43]  Yes    ARACELI (acute kidney injury) [N17.9]  Unknown    Hypokalemia [E87.6]  Unknown    Acute pyelonephritis [N10]  Unknown    Primary hypertension [I10]  Yes    Centrilobular emphysema [J43.2]  Yes    Tobacco abuse [Z72.0]  Yes    Presence of cardiac pacemaker [Z95.0]  Yes    Mixed hyperlipidemia [E78.2]  Yes      Resolved Hospital Problems   No resolved problems to display.        Brief Hospital Course to date:  David Barfield is a 74 y.o. male with a PMH significant for non-small cell lung cancer s/p neoadjuvant chemoimmunotherapy and RLL lobectomy (1/2024) currently on Opdivo (last tx 4/4/24), tobacco use disorder, HTN, emphysema who resented to the ER due to nausea, vomiting, diarrhea.  Found to have severe sepsis secondary to acute pyelonephritis in immunosuppressed patient.    Severe sepsis - POA w/tachycardia, lactic acidosis, elevated procal, leukocytosis, hypotension  Acute pyelonephritis  Immunosuppressed host  - Continue cefepime, ID following  - C. difficile and GI panel negative  -- Blood cultures NGTD; urine culture negative, but imaging and presentation consistent with pyelonephritis, continue abx per ID  - Lactic acid normalized with IVF     Olguric ARACELI, POA - worsening  --Baseline creatinine 0.94-1.1, now downtrending  --still with decent UOP  --Failed Lasix trial on 4/14  --Nephrology following, does not appear he will need HD, Creatinine improving, hold on renal bx    Possible GI bleed  Chronic Anemia  --Hemoglobin stable, given significant IVF, likely dilutional component  --PPI on hold due to renal fxn  --transfuse PRN Hgb <7 or symptomatic anemia  --suspect Anemia of CD, can f/u outpatient w/Dr. Ashley for further work-up/management as indicated     Non-small cell lung cancer  - Follows with Dr. Gely Ashley  - Last treatment with Opdivo 4/4/2024     Hypomagnesemia - resolved  - Replace per protocol    Hyperkalemia - resolved  --S/p lokelma       Hypertension  - Hold home lisinopril due to hypotension and ARACELI  - started norvasc w/improvement    Expected Discharge Location and Transportation: TBD, hopefully tomorrow, renal function continues to improve daily  Expected Discharge   Expected Discharge Date: 4/17/2024; Expected Discharge Time:      DVT prophylaxis:  Medical DVT prophylaxis orders are present.         AM-PAC 6 Clicks Score (PT): (P) 20 (04/21/24 0800)    CODE STATUS:   Code Status and Medical Interventions:   Ordered at: 04/13/24 0017     Level Of Support Discussed With:    Patient     Code Status (Patient has no pulse and is not breathing):    CPR (Attempt to Resuscitate)     Medical Interventions (Patient has pulse or is breathing):    Full Support       Dionne Samaniego, DO  04/21/24

## 2024-04-22 ENCOUNTER — READMISSION MANAGEMENT (OUTPATIENT)
Dept: CALL CENTER | Facility: HOSPITAL | Age: 74
End: 2024-04-22
Payer: MEDICARE

## 2024-04-22 VITALS
DIASTOLIC BLOOD PRESSURE: 72 MMHG | WEIGHT: 188.8 LBS | HEIGHT: 72 IN | RESPIRATION RATE: 18 BRPM | TEMPERATURE: 97.6 F | OXYGEN SATURATION: 100 % | HEART RATE: 73 BPM | BODY MASS INDEX: 25.57 KG/M2 | SYSTOLIC BLOOD PRESSURE: 114 MMHG

## 2024-04-22 LAB
ALBUMIN SERPL-MCNC: 3.3 G/DL (ref 3.5–5.2)
ANION GAP SERPL CALCULATED.3IONS-SCNC: 15 MMOL/L (ref 5–15)
BUN SERPL-MCNC: 26 MG/DL (ref 8–23)
BUN/CREAT SERPL: 6.2 (ref 7–25)
CALCIUM SPEC-SCNC: 8.7 MG/DL (ref 8.6–10.5)
CHLORIDE SERPL-SCNC: 108 MMOL/L (ref 98–107)
CO2 SERPL-SCNC: 19 MMOL/L (ref 22–29)
CREAT SERPL-MCNC: 4.19 MG/DL (ref 0.76–1.27)
EGFRCR SERPLBLD CKD-EPI 2021: 14.1 ML/MIN/1.73
GLUCOSE SERPL-MCNC: 101 MG/DL (ref 65–99)
PHOSPHATE SERPL-MCNC: 4.5 MG/DL (ref 2.5–4.5)
POTASSIUM SERPL-SCNC: 4 MMOL/L (ref 3.5–5.2)
SODIUM SERPL-SCNC: 142 MMOL/L (ref 136–145)

## 2024-04-22 PROCEDURE — 25010000002 HEPARIN (PORCINE) PER 1000 UNITS: Performed by: INTERNAL MEDICINE

## 2024-04-22 PROCEDURE — 80069 RENAL FUNCTION PANEL: CPT | Performed by: INTERNAL MEDICINE

## 2024-04-22 PROCEDURE — 97530 THERAPEUTIC ACTIVITIES: CPT

## 2024-04-22 PROCEDURE — 99239 HOSP IP/OBS DSCHRG MGMT >30: CPT | Performed by: INTERNAL MEDICINE

## 2024-04-22 PROCEDURE — 25010000002 CEFEPIME PER 500 MG: Performed by: INTERNAL MEDICINE

## 2024-04-22 RX ORDER — OMEPRAZOLE 40 MG/1
40 CAPSULE, DELAYED RELEASE ORAL DAILY
Qty: 30 CAPSULE | Refills: 5 | Status: ON HOLD | OUTPATIENT
Start: 2024-05-03

## 2024-04-22 RX ORDER — PANTOPRAZOLE SODIUM 40 MG/1
40 TABLET, DELAYED RELEASE ORAL
Status: DISCONTINUED | OUTPATIENT
Start: 2024-04-23 | End: 2024-04-22 | Stop reason: HOSPADM

## 2024-04-22 RX ORDER — AMLODIPINE BESYLATE 5 MG/1
5 TABLET ORAL
Qty: 30 TABLET | Refills: 0 | Status: ON HOLD | OUTPATIENT
Start: 2024-04-23 | End: 2024-05-23

## 2024-04-22 RX ADMIN — PANTOPRAZOLE SODIUM 40 MG: 40 INJECTION, POWDER, FOR SOLUTION INTRAVENOUS at 08:21

## 2024-04-22 RX ADMIN — AMLODIPINE BESYLATE 5 MG: 5 TABLET ORAL at 08:21

## 2024-04-22 RX ADMIN — CEFEPIME 2000 MG: 2 INJECTION, POWDER, FOR SOLUTION INTRAVENOUS at 08:21

## 2024-04-22 RX ADMIN — Medication 10 ML: at 08:21

## 2024-04-22 RX ADMIN — HEPARIN SODIUM 5000 UNITS: 5000 INJECTION INTRAVENOUS; SUBCUTANEOUS at 06:05

## 2024-04-22 RX ADMIN — Medication 1 PATCH: at 06:08

## 2024-04-22 RX ADMIN — TAMSULOSIN HYDROCHLORIDE 0.4 MG: 0.4 CAPSULE ORAL at 08:21

## 2024-04-22 RX ADMIN — BUDESONIDE AND FORMOTEROL FUMARATE DIHYDRATE 2 PUFF: 160; 4.5 AEROSOL RESPIRATORY (INHALATION) at 07:31

## 2024-04-22 NOTE — THERAPY DISCHARGE NOTE
Patient Name: David Barfield  : 1950    MRN: [id number]                              Today's Date: 2024       Admit Date: 2024    Visit Dx:     ICD-10-CM ICD-9-CM   1. Sepsis with acute renal failure without septic shock, due to unspecified organism, unspecified acute renal failure type  A41.9 038.9    R65.20 995.92    N17.9 584.9   2. Pyelonephritis  N12 590.80   3. Nausea vomiting and diarrhea  R11.2 787.91    R19.7 787.01   4. Leukocytosis, unspecified type  D72.829 288.60   5. Lactic acidosis  E87.20 276.2   6. Generalized weakness  R53.1 780.79   7. Postural dizziness with near syncope  R42 780.4    R55 780.2   8. Bronchogenic cancer of right lung  C34.91 162.9   9. Immunosuppression  D84.9 279.9   10. Elevated procalcitonin  R79.89 790.99   11. History of emphysema  J43.9 492.8   12. History of diabetes mellitus  Z86.39 V12.29   13. History of hypertension  Z86.79 V12.59   14. Former smoker  Z87.891 V15.82     Patient Active Problem List   Diagnosis    High degree atrioventricular block    Bradycardia, sinus    Chronic hypotension    PVC's (premature ventricular contractions)    Presence of cardiac pacemaker    Mixed hyperlipidemia    Centrilobular emphysema    Nodule of lower lobe of right lung    Mediastinal adenopathy    Cough    Tobacco abuse    Bronchogenic cancer of right lung    Malignant neoplasm of lower lobe of right lung    Primary hypertension    GERD without esophagitis    Sepsis    Acute pyelonephritis    ARACELI (acute kidney injury)    Hypokalemia    Severe malnutrition     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    Diabetes mellitus Borderline    Emphysema/COPD     Erectile disorder     GERD (gastroesophageal reflux disease)     History of chemotherapy     Hyperlipidemia     Hypertension 2019    Lung nodule     Mumps     Mumps     Pruritus     after bath    Slow  to wake up after anesthesia     Wears dentures     upper only    Wears hearing aid in both ears     usually only wears right     Past Surgical History:   Procedure Laterality Date    BONE BIOPSY      broken bone surgery in his face    BRONCHOSCOPY THORACOTOMY Right 1/9/2024    Procedure: THORACOTOMY FOR LOWER LOBECTOMY AND MEDISTINAL LYMPH NODE DISSECTION RIGHT;  Surgeon: Joey Patel MD;  Location:  [facility];  Service: Cardiothoracic;  Laterality: Right;    BRONCHOSCOPY WITH ION ROBOTIC ASSIST N/A 09/15/2023    Procedure: BRONCHOSCOPY NAVIGATION WITH ENDOBRONCHIAL ULTRASOUND AND ION ROBOT;  Surgeon: Octaviano Sampson MD;  Location:  [facility];  Service: Robotics - Pulmonary;  Laterality: N/A;  ion #6 - 0032  - 0015  Cath guide 0061    EBUS balloon removed and intact    CARDIAC ELECTROPHYSIOLOGY PROCEDURE N/A 08/17/2021    Procedure: Pacemaker DC new;  Surgeon: Kayy Box MD;  Location:  [facility];  Service: Cardiology;  Laterality: N/A;    FACIAL FRACTURE SURGERY      PACEMAKER IMPLANTATION        General Information       Row Name 04/22/24 1055          Physical Therapy Time and Intention    Document Type discharge treatment  -KR     Mode of Treatment individual therapy;physical therapy  -KR       Row Name 04/22/24 1055          General Information    Patient Profile Reviewed yes  -KR     Barriers to Rehab medically complex  -KR               User Key  (r) = Recorded By, (t) = Taken By, (c) = Cosigned By      Initials Name Provider Type    KR Grisel Burns PT Physical Therapist                   Mobility       Row Name 04/22/24 1055          Bed Mobility    Bed Mobility supine-sit  -KR     Supine-Sit [place] (Bed Mobility) modified independence  -KR     Assistive Device (Bed Mobility) head of bed elevated  -KR       Row Name 04/22/24 1055          Sit-Stand Transfer    Sit-Stand [place] (Transfers) independent  -KR     Comment, (Sit-Stand Transfer) 1x  from bed  -KR       Row Name 04/22/24 1055          Gait/Stairs (Locomotion)    [place] Level (Gait) independent  -KR     Distance in Feet (Gait) 200  200' x 2  -KR     Comment, (Gait/Stairs) pt able to perform components of the FGA including forward ambulation E.C., retroambulation, tandem ambulation, vertical and horizontal head turns, and demo a change in gait speed, all no LOB.  -KR               User Key  (r) = Recorded By, (t) = Taken By, (c) = Cosigned By      Initials Name Provider Type    KR Grisel Burns, DENEEN Physical Therapist                   Obj/Interventions       Row Name 04/22/24 1056          Motor Skills    Therapeutic Exercise hip;ankle  -KR     Additional Documentation Advanced Stepping/Walking Interventions (Group)  -KR       Row Name 04/22/24 1056          Hip (Therapeutic Exercise)    Hip (Therapeutic Exercise) strengthening exercise  -KR     Hip Strengthening (Therapeutic Exercise) 10 repetitions;bilateral;marching while standing;other (see comments)  10x STS arms across chest  -KR       Row Name 04/22/24 1056          Ankle (Therapeutic Exercise)    Ankle (Therapeutic Exercise) strengthening exercise  -KR     Ankle AROM (Therapeutic Exercise) 10 repetitions;bilateral;standing;plantarflexion  -KR       Row Name 04/22/24 1056          Balance    Balance Assessment sitting static balance;sitting dynamic balance;standing static balance;standing dynamic balance  -KR     Static Sitting Balance independent  -KR     Dynamic Sitting Balance independent  -KR     Position, Sitting Balance unsupported;sitting edge of bed  -KR     Static Standing Balance independent  -KR     Dynamic Standing Balance standby assist  -KR     Position/Device Used, Standing Balance unsupported  -KR     Balance Interventions sitting;standing;sit to stand;static;dynamic  -KR       Row Name 04/22/24 1056          Advanced Stepping/Walking Interventions    Stepping/Walking Interventions side stepping  -KR     Side  Stepping (Stepping/Walking Interventions) 10' B unsupported SBA  -KR               User Key  (r) = Recorded By, (t) = Taken By, (c) = Cosigned By      Initials Name Provider Type    Grisel Almonte, PT Physical Therapist                   Goals/Plan       Row Name 04/22/24 1058          Bed Mobility Goal 1 (PT)    Activity/Assistive Device (Bed Mobility Goal 1, PT) sit to supine/supine to sit  -KR     [place] Level/Cues Needed (Bed Mobility Goal 1, PT) modified independence  -KR     Time Frame (Bed Mobility Goal 1, PT) long term goal (LTG);10 days  -KR     Progress/Outcomes (Bed Mobility Goal 1, PT) goal met  -KR       Row Name 04/22/24 1058          Transfer Goal 1 (PT)    Activity/Assistive Device (Transfer Goal 1, PT) sit-to-stand/stand-to-sit;bed-to-chair/chair-to-bed  -KR     [place] Level/Cues Needed (Transfer Goal 1, PT) independent  -KR     Time Frame (Transfer Goal 1, PT) long term goal (LTG);10 days  -KR     Progress/Outcome (Transfer Goal 1, PT) goal met  -KR       Row Name 04/22/24 1058          Gait Training Goal 1 (PT)    Activity/Assistive Device (Gait Training Goal 1, PT) gait (walking locomotion)  -KR     [place] Level (Gait Training Goal 1, PT) independent  -KR     Distance (Gait Training Goal 1, PT) 800  -KR     Time Frame (Gait Training Goal 1, PT) long term goal (LTG);10 days  -KR     Strategies/Barriers (Gait Training Goal 1, PT) goal met per clinical judgement  -KR     Progress/Outcome (Gait Training Goal 1, PT) goal met  -KR       Row Name 04/22/24 1058          Stairs Goal 1 (PT)    Activity/Assistive Device (Stairs Goal 1, PT) ascending stairs;descending stairs;step-to-step  -KR     [place] Level/Cues Needed (Stairs Goal 1, PT) standby assist  -KR     Time Frame (Stairs Goal 1, PT) long term goal (LTG);10 days  -KR     Strategies/Barriers (Stairs Goal 1, PT) goal met per clinical judgement  -KR     Progress/Outcome (Stairs Goal 1, PT) goal met  -KR               User  Key  (r) = Recorded By, (t) = Taken By, (c) = Cosigned By      Initials Name Provider Type    Grisel Almonte, DENEEN Physical Therapist                   Clinical Impression       Row Name 04/22/24 1057          Pain    Pretreatment Pain Rating 0/10 - no pain  -KR     Posttreatment Pain Rating 0/10 - no pain  -KR       Row Name 04/22/24 1057          Plan of Care Review    Plan of Care Reviewed With patient  -KR     Progress improving  -KR     Outcome Evaluation Pt independent with all functional tasks. PT to dc pt from services at this time. Rec home upon dc.  -KR       Row Name 04/22/24 1057          Therapy Assessment/Plan (PT)    Criteria for Skilled Interventions Met (PT) no problems identified which require skilled intervention;no  -KR       Row Name 04/22/24 1057          Positioning and Restraints    Pre-Treatment Position in bed  -KR     Post Treatment Position bed  -KR     In Bed sitting EOB;call light within reach;encouraged to call for assist;with family/caregiver  pt up ad liliya  -KR               User Key  (r) = Recorded By, (t) = Taken By, (c) = Cosigned By      Initials Name Provider Type    Grisel Almonte, DENEEN Physical Therapist                   Outcome Measures       Row Name 04/22/24 1058          How much help from another person do you currently need...    Turning from your back to your side while in flat bed without using bedrails? 4  -KR     Moving from lying on back to sitting on the side of a flat bed without bedrails? 4  -KR     Moving to and from a bed to a chair (including a wheelchair)? 4  -KR     Standing up from a chair using your arms (e.g., wheelchair, bedside chair)? 4  -KR     Climbing 3-5 steps with a railing? 4  -KR     To walk in hospital room? 4  -KR     AM-PAC 6 Clicks Score (PT) 24  -KR     Highest Level of Mobility Goal 8 --> Walked 250 feet or more  -KR               User Key  (r) = Recorded By, (t) = Taken By, (c) = Cosigned By      Initials Name Provider Type    KR  Grisel Burns, PT Physical Therapist                  Physical Therapy Education       Title: PT OT SLP Therapies (Done)       Topic: Physical Therapy (Done)       Point: Mobility training (Done)       Learning Progress Summary             Patient Acceptance, E, VU by KR at 4/22/2024 1059    Acceptance, E,D, VU,NR by AB at 4/19/2024 1137    Eager, E, VU,DU,NR by SS at 4/16/2024 1604    Comment: Reviewed safety/technique w/bed mobility, transfers, ambulation, HEP, pursed lip breathing, paced activity, PT POC    Acceptance, E,D, VU,NR by AB at 4/15/2024 1541    Acceptance, E, VU by AE at 4/14/2024 1320   Family Acceptance, E, VU by KR at 4/22/2024 1059                         Point: Home exercise program (Done)       Learning Progress Summary             Patient Acceptance, E, VU by KR at 4/22/2024 1059    Acceptance, E,D, VU,NR by AB at 4/19/2024 1137    Eager, E, VU,DU,NR by SS at 4/16/2024 1604    Comment: Reviewed safety/technique w/bed mobility, transfers, ambulation, HEP, pursed lip breathing, paced activity, PT POC    Acceptance, E,D, VU,NR by AB at 4/15/2024 1541   Family Acceptance, E, VU by KR at 4/22/2024 1059                         Point: Body mechanics (Done)       Learning Progress Summary             Patient Acceptance, E, VU by KR at 4/22/2024 1059    Acceptance, E,D, VU,NR by AB at 4/19/2024 1137    Eager, E, VU,DU,NR by SS at 4/16/2024 1604    Comment: Reviewed safety/technique w/bed mobility, transfers, ambulation, HEP, pursed lip breathing, paced activity, PT POC    Acceptance, E,D, VU,NR by AB at 4/15/2024 1541    Acceptance, E, VU by AE at 4/14/2024 1320   Family Acceptance, E, VU by KR at 4/22/2024 1059                         Point: Precautions (Done)       Learning Progress Summary             Patient Acceptance, E, VU by KR at 4/22/2024 1059    Acceptance, E,D, VU,NR by AB at 4/19/2024 1137    Eager, E, JAYDEN POWELL,NR by SS at 4/16/2024 1607    Comment: Reviewed safety/technique w/bed mobility,  transfers, ambulation, HEP, pursed lip breathing, paced activity, PT POC    Acceptance, E,D, VU,NR by AB at 4/15/2024 1541    Acceptance, E, VU by AE at 4/14/2024 1320   Family Acceptance, E, VU by KR at 4/22/2024 1059                                         User Key       Initials Effective Dates Name Provider Type Discipline    SS 06/01/21 -  Amanda Scanlon, PT Physical Therapist PT    AE 09/21/21 -  Jeff Carbone, PT Physical Therapist PT    AB 09/22/22 -  Marie Becker, PT Physical Therapist PT    KR 12/30/22 -  Grisel Burns, PT Physical Therapist PT                  PT Recommendation and Plan     Plan of Care Reviewed With: patient  Progress: improving  Outcome Evaluation: Pt independent with all functional tasks. PT to dc pt from services at this time. Rec home upon dc.     Time Calculation:         PT Charges       Row Name 04/22/24 1059             Time Calculation    Start Time 1040  -KR      PT Received On 04/22/24  -KR         Timed Charges    27839 - PT Therapeutic Activity Minutes 14  -KR         Total Minutes    Timed Charges Total Minutes 14  -KR       Total Minutes 14  -KR                User Key  (r) = Recorded By, (t) = Taken By, (c) = Cosigned By      Initials Name Provider Type    Grisel Almonte, PT Physical Therapist                  Therapy Charges for Today       Code Description Service Date Service Provider Modifiers Qty    77525381815 HC PT THERAPEUTIC ACT EA 15 MIN 4/22/2024 Grisel Burns, PT GP 1            PT G-Codes  Outcome Measure Options: AM-PAC 6 Clicks Basic Mobility (PT)  AM-PAC 6 Clicks Score (PT): 24    PT Discharge Summary  Anticipated Discharge Disposition (PT): home with assist  Reason for Discharge: Independent, All goals achieved  Outcomes Achieved: Able to achieve all goals within established timeline    Grisel Burns PT  4/22/2024

## 2024-04-22 NOTE — CASE MANAGEMENT/SOCIAL WORK
Continued Stay Note  [facility]     Patient Name: David Barfield  MRN: [id number]  Today's Date: 4/22/2024    Admit Date: 4/12/2024    Plan: Home   Discharge Plan       Row Name 04/22/24 1108       Plan    Final Note Plan is home. Pt continues to deny any HH or any other discharge needs at this time. Plan is home with wife to transport.                   Discharge Codes    No documentation.                 Expected Discharge Date and Time       Expected Discharge Date Expected Discharge Time    Apr 22, 2024               TOLU Colon

## 2024-04-22 NOTE — PROGRESS NOTES
"   LOS: 10 days    Patient Care Team:  Shahid Drake MD as PCP - General (Family Medicine)  Octaviano Sampson MD as Consulting Physician (Pulmonary Disease)  Neetu Ashley MD as Referring Physician (Hematology and Oncology)  Nimo Rodríguez MD as Consulting Physician (Radiation Oncology)    Subjective   Good urine output.  ARACELI improving creatinine 4.19  No new events noted distress  Review of system:  Denies nausea vomiting, chest pain, shortness of breath, diarrhea, dysuria or hematuria.    Objective     Vital Signs:  Blood pressure 124/81, pulse 70, temperature 97.9 °F (36.6 °C), temperature source Oral, resp. rate 17, height 182.9 cm (72\"), weight 85.6 kg (188 lb 12.8 oz), SpO2 98%.      Intake/Output Summary (Last 24 hours) at 4/22/2024 0937  Last data filed at 4/22/2024 0745  Gross per 24 hour   Intake 525 ml   Output 3150 ml   Net -2625 ml        04/21 0701 - 04/22 0700  In: 900 [P.O.:900]  Out: 3150 [Urine:3150]    Physical Exam:  General Appearance: Awake alert  male no obvious distress.  Neck: Supple no JVD.  Lungs: Clear auscultation, no rales rhonchi's, equal chest movement, nonlabored.  Heart: No gallop, murmur, rub, RRR.  Abdomen: Soft, nontender, positive bowel sounds, no organomegaly.  Extremities: No lower extremity edema cyanosis.  Neuro: No focal deficit, moving all extremities, alert oriented X 3  Skin: Warm and dry.    Labs:  Results from last 7 days   Lab Units 04/20/24  0528 04/19/24  0323 04/18/24  0333   WBC 10*3/mm3 10.87* 10.31 10.44   HEMOGLOBIN g/dL 8.0* 7.6* 7.7*   PLATELETS 10*3/mm3 280 219 181     Results from last 7 days   Lab Units 04/22/24  0715 04/21/24  0403 04/20/24  0528 04/19/24  0323   SODIUM mmol/L 142 141 140 139   POTASSIUM mmol/L 4.0 4.0 4.1 4.2   CHLORIDE mmol/L 108* 109* 107 110*   CO2 mmol/L 19.0* 17.0* 19.0* 17.0*   BUN mg/dL 26* 32* 38* 45*   CREATININE mg/dL 4.19* 5.22* 6.41* 7.29*   CALCIUM mg/dL 8.7 8.5* 8.3* 8.0*   PHOSPHORUS mg/dL 4.5 4.8* 5.2* 4.4 "   ALBUMIN g/dL 3.3* 2.9* 2.9* 2.8*       Estimated Creatinine Clearance: 18.4 mL/min (A) (by C-G formula based on SCr of 4.19 mg/dL (H)).       A/P:    1.  ARF: Creatinine improved 4.19, baseline cr ~ 0.8mg/dl.  Peak creatinine 8.49.  Cr on this admission 2.2-2.7mg/dl. UA large blood, trace protein, large aspen esterase many wbc++. CT showed perinephric fat stranding consistent with pyelonephritis. Urine na 74 urine cl 16.  Patient likely with tubulointerstitial inflammation in the setting of infection with component of ATN due to hemodynamic instability on ACEI I with nausea, vomiting and poor po intake.. 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  Urine eos negative.  Doubt reaction due to antibiotics.      2.  Hypertension: Patient initially septic with pyelonephritis.  Improved with volume resuscitation on IVF.  Continues off all blood pressure medications..  RAAS suppression on hold with ARF.  Cover with as needed medications for now.    3.  Hyponatremia: Improving overnight.  Monitor for now.  All fluids isotonic.    4.  Hyperkalemia: Resolved.  Potassium initially up to 5.7 improved to normal range post Lokelma.  RAAS suppression on hold..    5.  Metabolic acidosis:: Stable.  Bicarb initially quite low at 16 with an underlying lactic acidemia due to poor perfusion.  Bicarb improved with perfusion support.  Would monitor for now.    6.  Anemia: Hemoglobin below goal.  Transfuse as indicated for Hgb <7.  Patient with possible GI bleed.  Recent black stools.  Workup underway.    7.  Small cell CA: Last treatment with Opdivo 4/4/2024.  Follows with Dr. Gely Ashley.    Renal function improving.  Patient will need outpatient appointment in 2 to 4 weeks.  No indication for dialysis at this time, no indication for kidney biopsy as renal function is improving.  Okay to discharge  Follow-up nephrology May 16 at 10:45 AM    Alok Dixon MD  04/22/24  09:37 EDT

## 2024-04-22 NOTE — PROGRESS NOTES
INFECTIOUS DISEASE Progress note     David Barfield  1950  [id number]        Admission Date: 4/12/2024      Requesting Provider: No Known Provider  Evaluating Physician: Derian Barry MD    Reason for Consultation: sepsis     History of present illness:    David Barfield is a 74 y.o. male, with PMH NSCLC/RLL lobectomy (1/2024) currently on Opdivo, HTN, emphysema, seen today for sepsis. Last Opdivo was last Thursday,  presented to the ED with complaints of  nausea, vomiting, lower back pain, and diarrhea. He has been afebrile. Admitting labs with WBC 36.28, plt 291, LAC 6.7, PCT 4.88, Scr 2.23, ALT 15, AST 33, negative respiratory panel , and UA with TNTC WBCS. CXR with chronic findings.  CT concerning with bilateral pyelonephritis, circumferential wall thickening of the urinary bladder, no abscess, hydronephrosis. Started on Vancomycin , Ertapenem and we were consulted for evaluation and treatment.      4/14/24:  Tmax of 99.1. Blood cultures remain negative to date. One episode of diarrhea this am.  Still with moderate amount of sputum production with cough.  Complains of right sided rib pain with cough.  Wife at bedside. Am labs pending     4/15/24: The patient remains afebrile.  He states he is feeling somewhat better.  No flank pain or abdominal pain today.  Nausea has improved.  Still having some intermittent diarrhea. No dysuria today.  Renal function is worse in his creatinine is over 6. Blood cell count is improving and was down to 16.58 today    4/16/24: The patient remains afebrile.  He is feeling better and is eating better today.  No flank pain.  No nausea or vomiting.  Diarrhea has improved.  Urine output is improving. Creatinine is worse today at 7.03.  White blood cell count is improving to 11.9 today.    4/17/24: The patient does not feel as well today and is more fatigued.  He is not eating quite as well.  Creatinine is increased over 8 today.  He states that his urine output has  increased.  He is not having any fevers.  Leukocytosis improved.  No severe watery diarrhea reported.  No abdominal pain or flank pain.    4/18/24: No new complaints today.  He states that his urine output has increased further.  No abdominal or flank pain.  No fevers.  No nausea, vomiting, or diarrhea. Cell count has improved to 10.44.  Creatinine is worse today at 8.45.  Potassium is okay at 4.0.    4/19/24: The patient is doing okay today.  No new complaints.  No nausea, vomiting, or diarrhea.  No flank pain or abdominal pain.  Creatinine is starting to trend back down.  Urine output remains good.  No fevers.    4/22/24: The patient is feeling better overall.  Urine output remains good.  No fevers.  No nausea, vomiting, diarrhea, abdominal pain, or flank pain. Creatinine has trended down to 4.19 today.    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    Diabetes mellitus Borderline    Emphysema/COPD     Erectile disorder     GERD (gastroesophageal reflux disease)     History of chemotherapy     Hyperlipidemia     Hypertension 2019    Lung nodule     Mumps     Mumps     Pruritus     after bath    Slow to wake up after anesthesia     Wears dentures     upper only    Wears hearing aid in both ears     usually only wears right       Past Surgical History:   Procedure Laterality Date    BONE BIOPSY      broken bone surgery in his face    BRONCHOSCOPY THORACOTOMY Right 1/9/2024    Procedure: THORACOTOMY FOR LOWER LOBECTOMY AND MEDISTINAL LYMPH NODE DISSECTION RIGHT;  Surgeon: Joey Patel MD;  Location:  [facility];  Service: Cardiothoracic;  Laterality: Right;    BRONCHOSCOPY WITH ION ROBOTIC ASSIST N/A 09/15/2023    Procedure: BRONCHOSCOPY NAVIGATION WITH ENDOBRONCHIAL ULTRASOUND AND ION ROBOT;  Surgeon: Octaviano Sampson MD;  Location:  [facility];  Service: Robotics - Pulmonary;  Laterality:  "N/A;  ion #6 - 0032  - 0015  Cath guide 0061    EBUS balloon removed and intact    CARDIAC ELECTROPHYSIOLOGY PROCEDURE N/A 08/17/2021    Procedure: Pacemaker DC new;  Surgeon: Kayy Box MD;  Location: [facility] CATH INVASIVE LOCATION;  Service: Cardiology;  Laterality: N/A;    FACIAL FRACTURE SURGERY      PACEMAKER IMPLANTATION         Family History   Problem Relation Age of Onset    Aneurysm Mother         brain    Dementia Father     Leukemia Sister     Heart disease Paternal Grandmother     Hypertension Paternal Grandfather        Social History     Socioeconomic History    Marital status: Significant Other    Number of children: 3   Tobacco Use    Smoking status: Former     Current packs/day: 0.50     Average packs/day: 0.5 packs/day for 56.3 years (28.7 ttl pk-yrs)     Types: Cigarettes     Start date: 1/1/1968    Smokeless tobacco: Never    Tobacco comments:     Pt states that he quit smoking on 1/8/24. The day before his sx.    Vaping Use    Vaping status: Never Used   Substance and Sexual Activity    Alcohol use: Never    Drug use: Never    Sexual activity: Defer     Partners: Female     Birth control/protection: None       Allergies   Allergen Reactions    Cymbalta [Duloxetine Hcl] GI Intolerance    Gabapentin Mental Status Change     Pt states that this medication \"makes him feel foolish in his head\".     Remeron [Mirtazapine] Other (See Comments)     Excess sedation    Toradol [Ketorolac Tromethamine] GI Intolerance     Projectile vomiting     Latex Other (See Comments)     Latex allergy     Tape Rash         Medication:  No current facility-administered medications for this encounter.    Current Outpatient Medications:     [START ON 4/23/2024] amLODIPine (NORVASC) 5 MG tablet, Take 1 tablet by mouth Daily for 30 days., Disp: 30 tablet, Rfl: 0    [START ON 5/3/2024] omeprazole (priLOSEC) 40 MG capsule, Take 1 capsule by mouth Daily., Disp: 30 capsule, Rfl: 5    albuterol sulfate  (90 " Base) MCG/ACT inhaler, Inhale 2 puffs Every 4 (Four) Hours As Needed for Wheezing., Disp: 18 g, Rfl: 11    B Complex Vitamins (vitamin b complex) capsule capsule, Take  by mouth Daily., Disp: , Rfl:     ferrous sulfate 325 (65 FE) MG tablet, Take 1 tablet by mouth Daily With Breakfast., Disp: , Rfl:     fluticasone (VERAMYST) 27.5 MCG/SPRAY nasal spray, 2 sprays into the nostril(s) as directed by provider 1 (One) Time As Needed for Rhinitis or Allergies., Disp: 6 mL, Rfl: 2    Fluticasone-Umeclidin-Vilant (Trelegy Ellipta) 100-62.5-25 MCG/ACT inhaler, Inhale 1 puff Daily., Disp: 3 each, Rfl: 3    HYDROcodone-acetaminophen (Norco) 7.5-325 MG per tablet, Take 1 tablet by mouth Every 6 (Six) Hours As Needed for Moderate Pain., Disp: 60 tablet, Rfl: 0    lidocaine-prilocaine (EMLA) 2.5-2.5 % cream, Apply 1 application  topically to the appropriate area as directed As Needed (45-60 minutes prior to port access.  Cover with saran/plastic wrap.)., Disp: 30 g, Rfl: 3    Nivolumab (OPDIVO IV), Infuse  into a venous catheter. Unsurse of dose, Disp: , Rfl:     ondansetron (ZOFRAN) 8 MG tablet, Take 1 tablet by mouth 3 (Three) Times a Day As Needed for Nausea or Vomiting., Disp: 30 tablet, Rfl: 2    pravastatin (PRAVACHOL) 80 MG tablet, Take 1 tablet by mouth Every Night., Disp: 30 tablet, Rfl: 11    sildenafil (VIAGRA) 100 MG tablet, Take 0.5 tablets by mouth Daily As Needed for Erectile Dysfunction., Disp: , Rfl:     Antibiotics:  Anti-Infectives (From admission, onward)      Ordered     Dose/Rate Route Frequency Start Stop    04/13/24 1025  cefepime 2000 mg IVPB in 100 mL NS (MBP)        Ordering Provider: Derian Barry MD    2,000 mg  over 30 Minutes Intravenous Once 04/13/24 1115 04/13/24 1301    04/12/24 1922  vancomycin IVPB 1750 mg in 0.9% Sodium Chloride (premix) 500 mL        Ordering Provider: Michelle Iyer PA-C    22 mg/kg × 78 kg  285.7 mL/hr over 105 Minutes Intravenous Once 04/12/24 2000 04/12/24 4787     24  piperacillin-tazobactam (ZOSYN) 3.375 g IVPB in 100 mL NS MBP (CD)        Ordering Provider: Michelle Iyer PA-C    3.375 g  over 30 Minutes Intravenous Once 24                Physical Exam:   Vital Signs  Temp (24hrs), Av.8 °F (36.6 °C), Min:97.2 °F (36.2 °C), Max:98.2 °F (36.8 °C)    Temp  Min: 97.2 °F (36.2 °C)  Max: 98.2 °F (36.8 °C)  BP  Min: 114/72  Max: 132/72  Pulse  Min: 70  Max: 94  Resp  Min: 16  Max: 19  SpO2  Min: 95 %  Max: 100 %    GENERAL: Sitting up on bedside.  No acute distress  HEENT: Normocephalic, atraumatic.  No external oral lesions noted  HEART: RRR; No murmur  LUNGS: CTA B.  Nonlabored breathing on room air  ABDOMEN: Soft, nontender, nondistended. No HSM palpated  MSK: No new joint effusions noted. No CVA tenderness bilaterally  SKIN: No new generalized rashes  NEURO: Awake and alert and oriented ×4.  Normal speech and cognition.  PSYCHIATRIC: Normal insight and judgment. Cooperative with PE    Right PPM site without redness, tenderness    Left PAC site without redness     Laboratory Data    Results from last 7 days   Lab Units 24  0528 24  0323 24  0333   WBC 10*3/mm3 10.87* 10.31 10.44   HEMOGLOBIN g/dL 8.0* 7.6* 7.7*   HEMATOCRIT % 24.8* 23.4* 23.2*   PLATELETS 10*3/mm3 280 219 181     Results from last 7 days   Lab Units 24  0715   SODIUM mmol/L 142   POTASSIUM mmol/L 4.0   CHLORIDE mmol/L 108*   CO2 mmol/L 19.0*   BUN mg/dL 26*   CREATININE mg/dL 4.19*   GLUCOSE mg/dL 101*   CALCIUM mg/dL 8.7                                   Estimated Creatinine Clearance: 18.4 mL/min (A) (by C-G formula based on SCr of 4.19 mg/dL (H)).      Microbiology:  Blood culture ×2 NG    Urine culture: No growth    Respiratory PCR panel: Negative      Radiology:  Imaging Results (Last 72 Hours)       ** No results found for the last 72 hours. **            Impression:   Sepsis, manifested by leukocytosis, acute kidney injury, hypotension,  tachycardia, lactic acidosis -Suspect from urinary source. Urine and blood cultures are no growth.  Sepsis is improving.  Pyuria/UTI/bilateral pyelonephritis per CT- Urine culture was no growth.  Improved  Acute kidney injury-ATN in the setting of sepsis/hypotension- Improving  Severe leukocytosis with neutrophilia-Improved  NSCLC, s/p lobectomy, now on immunotherapy   Nausea and vomiting-likely secondary to pyelonephritis-Improved  Diarrhea, C. Difficile test was negative.  Ongoing intermittent diarrhea  Hypophosphatemia-Improved  Macrocytic anemia-Ongoing but relatively stable  10. Nonsmall cell lung cancer of the RLL, Stage IIIA     PLAN/RECOMMENDATIONS:   - Cefepime per pharmacy dosing. Currently on day 10/10 of antibiotics for pyelonephritis    Renal function is now improving.  Urine output is good.      I am okay with his discharge today after his last dose of cefepime.    I discussed in length with the patient and his wife at bedside today      Copied text in this note has been reviewed and is accurate as of 04/22/24    Complex MDM    Derian Barry MD  4/22/2024  17:38 EDT

## 2024-04-22 NOTE — PLAN OF CARE
Goal Outcome Evaluation:  Plan of Care Reviewed With: patient        Progress: improving  Outcome Evaluation: Pt independent with all functional tasks. PT to dc pt from services at this time. Rec home upon dc.      Anticipated Discharge Disposition (PT): home with assist

## 2024-04-22 NOTE — PROGRESS NOTES
[facility] Medicine Services  PROGRESS NOTE    Patient Name: David Barfield  : 1950  MRN: [id number]    Date of Admission: 2024  Primary Care Physician: Shahid Drake MD    Subjective   Subjective     CC: Follow-up sepsis, ARACELI    HPI: No acute events overnight, patient rested well, feels better    Objective   Objective     Vital Signs:   Temp:  [97.2 °F (36.2 °C)-98.2 °F (36.8 °C)] 98 °F (36.7 °C)  Heart Rate:  [70-94] 72  Resp:  [16-19] 16  BP: (127-147)/(69-79) 128/71     Physical Exam:  Constitutional: Elderly male, in no acute distress, awake, alert  HENT: NCAT, mucous membranes moist  Respiratory: Clear to auscultation bilaterally, respiratory effort normal   Cardiovascular: RRR, no murmurs, rubs, or gallops  Gastrointestinal: Positive bowel sounds, soft, nontender, nondistended  Musculoskeletal: No bilateral ankle edema  Psychiatric: Appropriate affect, cooperative  Neurologic: Oriented x 3, nonfocal    Results Reviewed:  LAB RESULTS:      Lab 24  0528 24  0323 24  0333 24  0528 24  0428   WBC 10.87* 10.31 10.44 10.50 11.91*   HEMOGLOBIN 8.0* 7.6* 7.7* 8.4* 8.0*   HEMATOCRIT 24.8* 23.4* 23.2* 25.0* 24.1*   PLATELETS 280 219 181 185 179   NEUTROS ABS 7.69* 7.36* 7.52* 7.90*  --    IMMATURE GRANS (ABS) 0.12* 0.13* 0.08* 0.06*  --    LYMPHS ABS 1.33 1.35 1.31 1.09  --    MONOS ABS 1.41* 1.23* 1.28* 1.19*  --    EOS ABS 0.28 0.21 0.23 0.24  --    MCV 94.3 93.2 91.3 90.9 92.0         Lab 24  0403 24  0528 24  0323 24  0333 24  0528   SODIUM 141 140 139 137 135*   POTASSIUM 4.0 4.1 4.2 4.0 4.2   CHLORIDE 109* 107 110* 105 102   CO2 17.0* 19.0* 17.0* 19.0* 19.0*   ANION GAP 15.0 14.0 12.0 13.0 14.0   BUN 32* 38* 45* 54* 53*   CREATININE 5.22* 6.41* 7.29* 8.45* 8.37*   EGFR 10.8* 8.4* 7.2* 6.1* 6.1*   GLUCOSE 93 97 103* 105* 98   CALCIUM 8.5* 8.3* 8.0* 8.2* 7.9*   PHOSPHORUS 4.8* 5.2* 4.4 4.5  --          Lab  04/21/24  0403 04/20/24  0528 04/19/24  0323 04/18/24  0333   ALBUMIN 2.9* 2.9* 2.8* 2.4*                     Brief Urine Lab Results  (Last result in the past 365 days)        Color   Clarity   Blood   Leuk Est   Nitrite   Protein   CREAT   Urine HCG        04/13/24 1632             38.1                 Microbiology Results Abnormal       Procedure Component Value - Date/Time    Blood Culture - Blood, Wrist, Left [[id number]]  (Normal) Collected: 04/12/24 2009    Lab Status: Final result Specimen: Blood from Wrist, Left Updated: 04/17/24 2100     Blood Culture No growth at 5 days    Blood Culture - Blood, Arm, Right [[id number]]  (Normal) Collected: 04/12/24 2009    Lab Status: Final result Specimen: Blood from Arm, Right Updated: 04/17/24 2100     Blood Culture No growth at 5 days    Urine Culture - Urine, Straight Cath [[id number]]  (Normal) Collected: 04/12/24 2247    Lab Status: Final result Specimen: Urine from Straight Cath Updated: 04/14/24 1206     Urine Culture No growth    Gastrointestinal Panel, PCR - Stool, Per Rectum [[id number]]  (Normal) Collected: 04/14/24 0709    Lab Status: Final result Specimen: Stool from Per Rectum Updated: 04/14/24 0908     Campylobacter Not Detected     Plesiomonas shigelloides Not Detected     Salmonella Not Detected     Vibrio Not Detected     Vibrio cholerae Not Detected     Yersinia enterocolitica Not Detected     Enteroaggregative E. coli (EAEC) Not Detected     Enteropathogenic E. coli (EPEC) Not Detected     Enterotoxigenic E. coli (ETEC) lt/st Not Detected     Shiga-like toxin-producing E. coli (STEC) stx1/stx2 Not Detected     Shigella/Enteroinvasive E. coli (EIEC) Not Detected     Cryptosporidium Not Detected     Cyclospora cayetanensis Not Detected     Entamoeba histolytica Not Detected     Giardia lamblia Not Detected     Adenovirus F40/41 Not Detected     Astrovirus Not Detected     Norovirus GI/GII Not Detected     Rotavirus A Not Detected     Sapovirus (I, II, IV  or V) Not Detected    Clostridioides difficile Toxin - Stool, Per Rectum [[id number]]  (Normal) Collected: 04/14/24 0709    Lab Status: Final result Specimen: Stool from Per Rectum Updated: 04/14/24 0817    Narrative:      The following orders were created for panel order Clostridioides difficile Toxin - Stool, Per Rectum.  Procedure                               Abnormality         Status                     ---------                               -----------         ------                     Clostridioides difficile...[[id number]]  Normal              Final result                 Please view results for these tests on the individual orders.    Clostridioides difficile Toxin, PCR - Stool, Per Rectum [[id number]]  (Normal) Collected: 04/14/24 0709    Lab Status: Final result Specimen: Stool from Per Rectum Updated: 04/14/24 0817     Toxigenic C. difficile by PCR Not Detected    Narrative:      The result indicates the absence of toxigenic C. difficile from stool specimen.     Eosinophil Smear - Urine, Urine, Clean Catch [[id number]]  (Normal) Collected: 04/13/24 1631    Lab Status: Final result Specimen: Urine, Clean Catch Updated: 04/13/24 1732     Eosinophil Smear 0 % EOS/100 Cells     Narrative:      No eosinophil seen    MRSA Screen, PCR (Inpatient) - Swab, Nares [[id number]]  (Normal) Collected: 04/12/24 2128    Lab Status: Final result Specimen: Swab from Nares Updated: 04/13/24 0803     MRSA PCR Negative    Narrative:      The negative predictive value of this diagnostic test is high and should only be used to consider de-escalating anti-MRSA therapy. A positive result may indicate colonization with MRSA and must be correlated clinically.  MRSA Negative    COVID PRE-OP / PRE-PROCEDURE SCREENING ORDER (NO ISOLATION) - Swab, Nasopharynx [[id number]]  (Normal) Collected: 04/12/24 2010    Lab Status: Final result Specimen: Swab from Nasopharynx Updated: 04/12/24 2121    Narrative:      The following orders were  created for panel order COVID PRE-OP / PRE-PROCEDURE SCREENING ORDER (NO ISOLATION) - Swab, Nasopharynx.  Procedure                               Abnormality         Status                     ---------                               -----------         ------                     Respiratory Panel PCR w/...[[id number]]  Normal              Final result                 Please view results for these tests on the individual orders.    Respiratory Panel PCR w/COVID-19(SARS-CoV-2) [facility]-House, NP Swab in UTM/VTM, 2 HR TAT - Swab, Nasopharynx [[id number]]  (Normal) Collected: 04/12/24 2010    Lab Status: Final result Specimen: Swab from Nasopharynx Updated: 04/12/24 2121     ADENOVIRUS, PCR Not Detected     Coronavirus 229E Not Detected     Coronavirus HKU1 Not Detected     Coronavirus NL63 Not Detected     Coronavirus OC43 Not Detected     COVID19 Not Detected     Human Metapneumovirus Not Detected     Human Rhinovirus/Enterovirus Not Detected     Influenza A PCR Not Detected     Influenza B PCR Not Detected     Parainfluenza Virus 1 Not Detected     Parainfluenza Virus 2 Not Detected     Parainfluenza Virus 3 Not Detected     Parainfluenza Virus 4 Not Detected     RSV, PCR Not Detected     Bordetella pertussis pcr Not Detected     Bordetella parapertussis PCR Not Detected     Chlamydophila pneumoniae PCR Not Detected     Mycoplasma pneumo by PCR Not Detected    Narrative:      In the setting of a positive respiratory panel with a viral infection PLUS a negative procalcitonin without other underlying concern for bacterial infection, consider observing off antibiotics or discontinuation of antibiotics and continue supportive care. If the respiratory panel is positive for atypical bacterial infection (Bordetella pertussis, Chlamydophila pneumoniae, or Mycoplasma pneumoniae), consider antibiotic de-escalation to target atypical bacterial infection.            No radiology results from the last 24  hrs    Results for orders placed during the hospital encounter of 07/09/21    Adult Transthoracic Echo Complete W/ Cont if Necessary Per Protocol    Interpretation Summary  · Estimated left ventricular EF = 55% Left ventricular systolic function is normal.  · Left ventricular diastolic function was normal.  · Left ventricular wall thickness is consistent with mild concentric hypertrophy.  · Trace mitral and tricuspid regurgitation.      Current medications:  Scheduled Meds:amLODIPine, 5 mg, Oral, Q24H  budesonide-formoterol, 2 puff, Inhalation, BID - RT   And  tiotropium bromide monohydrate, 2 puff, Inhalation, Daily - RT  cefepime, 2,000 mg, Intravenous, Daily  heparin (porcine), 5,000 Units, Subcutaneous, Q8H  nicotine, 1 patch, Transdermal, Q24H  [Held by provider] pantoprazole, 40 mg, Oral, Q AM  pantoprazole, 40 mg, Intravenous, Q12H  pravastatin, 80 mg, Oral, Nightly  sodium chloride, 10 mL, Intravenous, Q12H  tamsulosin, 0.4 mg, Oral, Daily      Continuous Infusions:   PRN Meds:.  acetaminophen **OR** acetaminophen **OR** acetaminophen    Calcium Replacement - Follow Nurse / BPA Driven Protocol    hydrALAZINE    HYDROcodone-acetaminophen    Magnesium Standard Dose Replacement - Follow Nurse / BPA Driven Protocol    nicotine polacrilex    ondansetron ODT **OR** ondansetron    prochlorperazine    sodium chloride    sodium chloride    sodium chloride    Assessment & Plan   Assessment & Plan     Active Hospital Problems    Diagnosis  POA    **Sepsis [A41.9]  Yes    Severe malnutrition [E43]  Yes    ARACELI (acute kidney injury) [N17.9]  Unknown    Hypokalemia [E87.6]  Unknown    Acute pyelonephritis [N10]  Unknown    Primary hypertension [I10]  Yes    Centrilobular emphysema [J43.2]  Yes    Tobacco abuse [Z72.0]  Yes    Presence of cardiac pacemaker [Z95.0]  Yes    Mixed hyperlipidemia [E78.2]  Yes      Resolved Hospital Problems   No resolved problems to display.        Brief Hospital Course to date:  David Thayer  Lico is a 74 y.o. male with a PMH significant for non-small cell lung cancer s/p neoadjuvant chemoimmunotherapy and RLL lobectomy (1/2024) currently on Opdivo (last tx 4/4/24), tobacco use disorder, HTN, emphysema who resented to the ER due to nausea, vomiting, diarrhea.  Found to have severe sepsis secondary to acute pyelonephritis in immunosuppressed patient.     This patient's problems and plans were partially entered by my partner and updated as appropriate by me 04/22/24.     Severe sepsis - POA w/tachycardia, lactic acidosis, elevated procal, leukocytosis, hypotension  Acute pyelonephritis  Immunosuppressed host  - Patient followed by ID, C. difficile and GI panel were negative, blood cultures NGTD, urine cultures negative but imaging and presentation was consistent with pyelonephritis   -He is s/p 10-day course of antibiotics with cefepime  -No plan for further antibiotics on discharge    Olguric ARACELI, POA - worsening  -Nephrology followed, no plan for HD or renal biopsy at this time suspect.  Additional information secondary to ongoing infection and complaint of ATN while also on ACEi  --Baseline creatinine 0.94-1.1, peaked to 8.45, currently down trending  --still with decent UOP  --Failed Lasix trial on 4/14  -Will need close follow-up as outpatient    Chronic Anemia  --Hemoglobin stable, given significant IVF, this was likely.  --PPI on hold due to renal fxn  --suspect Anemia of CD,  f/u outpatient w/Dr. Ashley for further work-up/management as indicated     Non-small cell lung cancer  - Follows with Dr. Gely Ashley  - Last treatment with Opdivo 4/4/2024     Hypomagnesemia - resolved  - Replace per protocol     Hyperkalemia - resolved  --S/p lokelma      Hypertension  - BP is much improved, continue Norvasc, holding lisinopril secondary to ARACELI     All problems listed above are new to me today    Expected Discharge Location and Transportation: Home  Expected Discharge   Expected Discharge Date:  4/24/2024; Expected Discharge Time:      DVT prophylaxis:  Medical DVT prophylaxis orders are present.         AM-PAC 6 Clicks Score (PT): 20 (04/21/24 0800)    CODE STATUS:   Code Status and Medical Interventions:   Ordered at: 04/13/24 0017     Level Of Support Discussed With:    Patient     Code Status (Patient has no pulse and is not breathing):    CPR (Attempt to Resuscitate)     Medical Interventions (Patient has pulse or is breathing):    Full Support       Roc Rosales MD  04/22/24

## 2024-04-22 NOTE — OUTREACH NOTE
Prep Survey      Flowsheet Row Responses   [facility] facility patient discharged from? [facility]   Is LACE score < 7 ? No   Eligibility [facility]   Date of Admission 04/12/24   Date of Discharge 04/22/24   Discharge Disposition Home or Self Care   Discharge diagnosis Sepsis   Does the patient have one of the following disease processes/diagnoses(primary or secondary)? Sepsis   Does the patient have Home health ordered? No   Is there a DME ordered? No   Prep survey completed? Yes            EUNICE DUNNE - Registered Nurse

## 2024-04-22 NOTE — DISCHARGE SUMMARY
[facility] Medicine Services  DISCHARGE SUMMARY    Patient Name: David Barfield  : 1950  MRN: [id number]    Date of Admission: 2024  6:50 PM  Date of Discharge: 2024  Primary Care Physician: Shahid Drake MD    Consults       Date and Time Order Name Status Description    2024 12:27 PM Inpatient Nephrology Consult      2024  2:55 AM Inpatient Infectious Diseases Consult Completed             Hospital Course     Active Hospital Problems    Diagnosis  POA    **Sepsis [A41.9]  Yes    Severe malnutrition [E43]  Yes    ARACELI (acute kidney injury) [N17.9]  Unknown    Hypokalemia [E87.6]  Unknown    Acute pyelonephritis [N10]  Unknown    Primary hypertension [I10]  Yes    Centrilobular emphysema [J43.2]  Yes    Tobacco abuse [Z72.0]  Yes    Presence of cardiac pacemaker [Z95.0]  Yes    Mixed hyperlipidemia [E78.2]  Yes      Resolved Hospital Problems   No resolved problems to display.      Hospital Course:  David Barfield is a 74 y.o. male with a PMH significant for non-small cell lung cancer s/p neoadjuvant chemoimmunotherapy and RLL lobectomy (2024) currently on Opdivo (last tx 24), tobacco use disorder, HTN, emphysema who resented to the ER due to nausea, vomiting, diarrhea.  Found to have severe sepsis secondary to acute pyelonephritis in immunosuppressed patient.    Severe sepsis - POA w/tachycardia, lactic acidosis, elevated procal, leukocytosis, hypotension  Acute pyelonephritis  Immunosuppressed host  - Patient followed by ID, C. difficile and GI panel were negative, blood cultures NGTD, urine cultures negative but imaging and presentation was consistent with pyelonephritis   -He is s/p 10-day course of antibiotics with cefepime  -No plan for further antibiotics on discharge, follow-up with ID as scheduled     Olguric ARACELI, POA - worsening  -Nephrology followed, no plan for HD or renal biopsy at this time suspect.  Additional information secondary to  ongoing infection and complaint of ATN while also on ACEi  --Baseline creatinine 0.94-1.1, peaked to 8.45, currently down trending  --still with decent UOP  --Failed Lasix trial on 4/14  -Will need close follow-up as outpatient, in 2 weeks     Chronic Anemia  --Hemoglobin stable, given significant IVF  --PPI on hold due to renal fxn  --suspect Anemia of CD,  f/u outpatient w/Dr. Ashley for further work-up/management as indicated     Non-small cell lung cancer  - Follows with Dr. Gely Ashley  - Last treatment with Opdivo 4/4/2024     Hypomagnesemia - resolved  - s/p replacement     Hyperkalemia - resolved  --S/p lokelma      Hypertension  - BP is much improved, continue Norvasc, holding lisinopril secondary to ARACELI    Discharge Follow Up Recommendations for outpatient labs/diagnostics:  Follow-up with PCP in 1 week  Follow-up with nephrology as scheduled in 2 weeks  Follow-up with ID    Day of Discharge     HPI: No acute events overnight, patient stable, no new complaints this morning    Review of Systems  Gen- No fevers, chills  CV- No chest pain, palpitations  Resp- No cough, dyspnea  GI- No N/V/D, abd pain      Vital Signs:   Temp:  [97.2 °F (36.2 °C)-98.2 °F (36.8 °C)] 97.9 °F (36.6 °C)  Heart Rate:  [70-94] 70  Resp:  [16-19] 17  BP: (124-147)/(69-81) 124/81      Physical Exam:  Constitutional: No acute distress, awake, alert  HENT: NCAT, mucous membranes moist  Respiratory: Clear to auscultation bilaterally, respiratory effort normal   Cardiovascular: RRR, no murmurs, rubs, or gallops  Gastrointestinal: Positive bowel sounds, soft, nontender, nondistended  Musculoskeletal: No bilateral ankle edema  Psychiatric: Appropriate affect, cooperative  Neurologic: Oriented x 3, nonfocal  Skin: No rashes      Pertinent  and/or Most Recent Results     LAB RESULTS:      Lab 04/20/24  0528 04/19/24  0323 04/18/24  0333 04/17/24  0528 04/16/24  0428   WBC 10.87* 10.31 10.44 10.50 11.91*   HEMOGLOBIN 8.0* 7.6* 7.7* 8.4* 8.0*    HEMATOCRIT 24.8* 23.4* 23.2* 25.0* 24.1*   PLATELETS 280 219 181 185 179   NEUTROS ABS 7.69* 7.36* 7.52* 7.90*  --    IMMATURE GRANS (ABS) 0.12* 0.13* 0.08* 0.06*  --    LYMPHS ABS 1.33 1.35 1.31 1.09  --    MONOS ABS 1.41* 1.23* 1.28* 1.19*  --    EOS ABS 0.28 0.21 0.23 0.24  --    MCV 94.3 93.2 91.3 90.9 92.0         Lab 04/22/24  0715 04/21/24  0403 04/20/24  0528 04/19/24 0323 04/18/24  0333   SODIUM 142 141 140 139 137   POTASSIUM 4.0 4.0 4.1 4.2 4.0   CHLORIDE 108* 109* 107 110* 105   CO2 19.0* 17.0* 19.0* 17.0* 19.0*   ANION GAP 15.0 15.0 14.0 12.0 13.0   BUN 26* 32* 38* 45* 54*   CREATININE 4.19* 5.22* 6.41* 7.29* 8.45*   EGFR 14.1* 10.8* 8.4* 7.2* 6.1*   GLUCOSE 101* 93 97 103* 105*   CALCIUM 8.7 8.5* 8.3* 8.0* 8.2*   PHOSPHORUS 4.5 4.8* 5.2* 4.4 4.5         Lab 04/22/24  0715 04/21/24 0403 04/20/24  0528 04/19/24 0323 04/18/24  0333   ALBUMIN 3.3* 2.9* 2.9* 2.8* 2.4*                     Brief Urine Lab Results  (Last result in the past 365 days)        Color   Clarity   Blood   Leuk Est   Nitrite   Protein   CREAT   Urine HCG        04/13/24 1632             38.1               Microbiology Results (last 10 days)       Procedure Component Value - Date/Time    Clostridioides difficile Toxin - Stool, Per Rectum [[id number]]  (Normal) Collected: 04/14/24 0709    Lab Status: Final result Specimen: Stool from Per Rectum Updated: 04/14/24 0817    Narrative:      The following orders were created for panel order Clostridioides difficile Toxin - Stool, Per Rectum.  Procedure                               Abnormality         Status                     ---------                               -----------         ------                     Clostridioides difficile...[[id number]]  Normal              Final result                 Please view results for these tests on the individual orders.    Gastrointestinal Panel, PCR - Stool, Per Rectum [[id number]]  (Normal) Collected: 04/14/24 0709    Lab Status: Final result  Specimen: Stool from Per Rectum Updated: 04/14/24 0908     Campylobacter Not Detected     Plesiomonas shigelloides Not Detected     Salmonella Not Detected     Vibrio Not Detected     Vibrio cholerae Not Detected     Yersinia enterocolitica Not Detected     Enteroaggregative E. coli (EAEC) Not Detected     Enteropathogenic E. coli (EPEC) Not Detected     Enterotoxigenic E. coli (ETEC) lt/st Not Detected     Shiga-like toxin-producing E. coli (STEC) stx1/stx2 Not Detected     Shigella/Enteroinvasive E. coli (EIEC) Not Detected     Cryptosporidium Not Detected     Cyclospora cayetanensis Not Detected     Entamoeba histolytica Not Detected     Giardia lamblia Not Detected     Adenovirus F40/41 Not Detected     Astrovirus Not Detected     Norovirus GI/GII Not Detected     Rotavirus A Not Detected     Sapovirus (I, II, IV or V) Not Detected    Clostridioides difficile Toxin, PCR - Stool, Per Rectum [[id number]]  (Normal) Collected: 04/14/24 0709    Lab Status: Final result Specimen: Stool from Per Rectum Updated: 04/14/24 0817     Toxigenic C. difficile by PCR Not Detected    Narrative:      The result indicates the absence of toxigenic C. difficile from stool specimen.     Eosinophil Smear - Urine, Urine, Clean Catch [[id number]]  (Normal) Collected: 04/13/24 1631    Lab Status: Final result Specimen: Urine, Clean Catch Updated: 04/13/24 1732     Eosinophil Smear 0 % EOS/100 Cells     Narrative:      No eosinophil seen    Urine Culture - Urine, Straight Cath [[id number]]  (Normal) Collected: 04/12/24 2247    Lab Status: Final result Specimen: Urine from Straight Cath Updated: 04/14/24 1206     Urine Culture No growth    MRSA Screen, PCR (Inpatient) - Swab, Nares [[id number]]  (Normal) Collected: 04/12/24 2128    Lab Status: Final result Specimen: Swab from Nares Updated: 04/13/24 0803     MRSA PCR Negative    Narrative:      The negative predictive value of this diagnostic test is high and should only be used to consider  de-escalating anti-MRSA therapy. A positive result may indicate colonization with MRSA and must be correlated clinically.  MRSA Negative    COVID PRE-OP / PRE-PROCEDURE SCREENING ORDER (NO ISOLATION) - Swab, Nasopharynx [[id number]]  (Normal) Collected: 04/12/24 2010    Lab Status: Final result Specimen: Swab from Nasopharynx Updated: 04/12/24 2121    Narrative:      The following orders were created for panel order COVID PRE-OP / PRE-PROCEDURE SCREENING ORDER (NO ISOLATION) - Swab, Nasopharynx.  Procedure                               Abnormality         Status                     ---------                               -----------         ------                     Respiratory Panel PCR w/...[[id number]]  Normal              Final result                 Please view results for these tests on the individual orders.    Respiratory Panel PCR w/COVID-19(SARS-CoV-2) [facility] In-House, NP Swab in UTM/VTM, 2 HR TAT - Swab, Nasopharynx [[id number]]  (Normal) Collected: 04/12/24 2010    Lab Status: Final result Specimen: Swab from Nasopharynx Updated: 04/12/24 2121     ADENOVIRUS, PCR Not Detected     Coronavirus 229E Not Detected     Coronavirus HKU1 Not Detected     Coronavirus NL63 Not Detected     Coronavirus OC43 Not Detected     COVID19 Not Detected     Human Metapneumovirus Not Detected     Human Rhinovirus/Enterovirus Not Detected     Influenza A PCR Not Detected     Influenza B PCR Not Detected     Parainfluenza Virus 1 Not Detected     Parainfluenza Virus 2 Not Detected     Parainfluenza Virus 3 Not Detected     Parainfluenza Virus 4 Not Detected     RSV, PCR Not Detected     Bordetella pertussis pcr Not Detected     Bordetella parapertussis PCR Not Detected     Chlamydophila pneumoniae PCR Not Detected     Mycoplasma pneumo by PCR Not Detected    Narrative:      In the setting of a positive respiratory panel with a viral infection PLUS a negative procalcitonin without other underlying concern for  bacterial infection, consider observing off antibiotics or discontinuation of antibiotics and continue supportive care. If the respiratory panel is positive for atypical bacterial infection (Bordetella pertussis, Chlamydophila pneumoniae, or Mycoplasma pneumoniae), consider antibiotic de-escalation to target atypical bacterial infection.    Blood Culture - Blood, Wrist, Left [[id number]]  (Normal) Collected: 04/12/24 2009    Lab Status: Final result Specimen: Blood from Wrist, Left Updated: 04/17/24 2100     Blood Culture No growth at 5 days    Blood Culture - Blood, Arm, Right [[id number]]  (Normal) Collected: 04/12/24 2009    Lab Status: Final result Specimen: Blood from Arm, Right Updated: 04/17/24 2100     Blood Culture No growth at 5 days            CT Chest Without Contrast Diagnostic    Result Date: 4/12/2024  CT ABDOMEN PELVIS WO CONTRAST, CT CHEST WO CONTRAST DIAGNOSTIC Date of Exam: 4/12/2024 8:00 PM EDT Indication: Sepsis. Comparison: 3/28/2024 PET/CT; Technique: Axial CT images were obtained of the chest, abdomen and pelvis without the administration of contrast. Reconstructed coronal and sagittal images were also obtained. Automated exposure control and iterative construction methods were used. Findings: CT chest: The central airways are patent. Redemonstrated postsurgical changes of the right lung with medial inferior scarring and small adjacent loculated pleural effusion. There is background emphysematous changes with biapical pleural-parenchymal scarring. Scattered calcified granulomas. No suspicious pulmonary nodule or mass. No focal airspace consolidation. Right chest wall cardiac device distal lead tips in unchanged position. Left chest wall port with distal lead tip overlying the superior vena cava. The heart is unchanged in size. Coronary artery calcifications. No pericardial effusion. No mediastinal mass. Prominent mediastinal lymph nodes. Prominent right supraclavicular lymph node. Chest wall  soft tissues show no acute abnormality. CT abdomen/pelvis: There is no suspicious hepatic lesion. The gallbladder is unremarkable. No significant biliary ductal dilation. The spleen, pancreas, and bilateral adrenal glands are unchanged. As compared to 3/28/2024 PET/CT, there is edematous appearance of the bilateral kidneys with extensive bilateral perinephric fat stranding. No hydronephrosis or nephrolithiasis. Small hiatal hernia. No evidence of bowel obstruction. No suspicious abdominal or pelvic lymphadenopathy. No abdominal aortic aneurysm. Atherosclerotic calcifications of the aorta and branch vessels. There is circumferential wall thickening of the urinary bladder. The prostate is enlarged with prostatic calcifications. Abdominal and pelvic wall soft tissues show no acute abnormality. Small bilateral fat-containing inguinal hernias. Redemonstrated right lateral fifth and sixth rib fractures which are healing. There is no evidence of acute fracture or suspicious osseous lesion.     Impression: Findings concerning for bilateral pyelonephritis. Circumferential wall thickening of the urinary bladder which can be seen with cystitis. No evidence of abscess formation, hydronephrosis, or other complication. Additional chronic findings as above including surgical changes of the right lung and healing right lateral fifth and sixth rib fractures. Small hiatal hernia. Previously seen mildly prominent hypermetabolic mediastinal and right supraclavicular lymph nodes are unchanged. Electronically Signed: Raulito Light MD  4/12/2024 8:22 PM EDT  Workstation ID: [id number]    CT Abdomen Pelvis Without Contrast    Result Date: 4/12/2024  CT ABDOMEN PELVIS WO CONTRAST, CT CHEST WO CONTRAST DIAGNOSTIC Date of Exam: 4/12/2024 8:00 PM EDT Indication: Sepsis. Comparison: 3/28/2024 PET/CT; Technique: Axial CT images were obtained of the chest, abdomen and pelvis without the administration of contrast. Reconstructed coronal and sagittal  images were also obtained. Automated exposure control and iterative construction methods were used. Findings: CT chest: The central airways are patent. Redemonstrated postsurgical changes of the right lung with medial inferior scarring and small adjacent loculated pleural effusion. There is background emphysematous changes with biapical pleural-parenchymal scarring. Scattered calcified granulomas. No suspicious pulmonary nodule or mass. No focal airspace consolidation. Right chest wall cardiac device distal lead tips in unchanged position. Left chest wall port with distal lead tip overlying the superior vena cava. The heart is unchanged in size. Coronary artery calcifications. No pericardial effusion. No mediastinal mass. Prominent mediastinal lymph nodes. Prominent right supraclavicular lymph node. Chest wall soft tissues show no acute abnormality. CT abdomen/pelvis: There is no suspicious hepatic lesion. The gallbladder is unremarkable. No significant biliary ductal dilation. The spleen, pancreas, and bilateral adrenal glands are unchanged. As compared to 3/28/2024 PET/CT, there is edematous appearance of the bilateral kidneys with extensive bilateral perinephric fat stranding. No hydronephrosis or nephrolithiasis. Small hiatal hernia. No evidence of bowel obstruction. No suspicious abdominal or pelvic lymphadenopathy. No abdominal aortic aneurysm. Atherosclerotic calcifications of the aorta and branch vessels. There is circumferential wall thickening of the urinary bladder. The prostate is enlarged with prostatic calcifications. Abdominal and pelvic wall soft tissues show no acute abnormality. Small bilateral fat-containing inguinal hernias. Redemonstrated right lateral fifth and sixth rib fractures which are healing. There is no evidence of acute fracture or suspicious osseous lesion.     Impression: Findings concerning for bilateral pyelonephritis. Circumferential wall thickening of the urinary bladder which  can be seen with cystitis. No evidence of abscess formation, hydronephrosis, or other complication. Additional chronic findings as above including surgical changes of the right lung and healing right lateral fifth and sixth rib fractures. Small hiatal hernia. Previously seen mildly prominent hypermetabolic mediastinal and right supraclavicular lymph nodes are unchanged. Electronically Signed: Raulito Light MD  4/12/2024 8:22 PM EDT  Workstation ID: [id number]    XR Chest 1 View    Result Date: 4/12/2024  XR CHEST 1 VW Date of Exam: 4/12/2024 6:56 PM EDT Indication: Weak/Dizzy/AMS triage protocol Comparison: 2/21/2024 CT Findings: Right chest wall cardiac device distal lead tips in unchanged position. Left chest wall port with distal lead tip overlying the superior vena cava. There is a chronic small right-sided pleural effusion. No evidence of left pleural effusion. No new focal airspace consolidation. No pneumothorax. No acute osseous abnormality.     Impression: No acute cardiopulmonary abnormality. Chronic findings as above. Electronically Signed: Raulito Light MD  4/12/2024 7:26 PM EDT  Workstation ID: [id number]             Results for orders placed during the hospital encounter of 07/09/21    Adult Transthoracic Echo Complete W/ Cont if Necessary Per Protocol    Interpretation Summary  · Estimated left ventricular EF = 55% Left ventricular systolic function is normal.  · Left ventricular diastolic function was normal.  · Left ventricular wall thickness is consistent with mild concentric hypertrophy.  · Trace mitral and tricuspid regurgitation.      Plan for Follow-up of Pending Labs/Results:       Discharge Details        Discharge Medications        New Medications        Instructions Start Date   amLODIPine 5 MG tablet  Commonly known as: NORVASC   5 mg, Oral, Every 24 Hours Scheduled   Start Date: April 23, 2024            Changes to Medications        Instructions Start Date   omeprazole 40 MG capsule  Commonly  "known as: priLOSEC  What changed: These instructions start on May 3, 2024. If you are unsure what to do until then, ask your doctor or other care provider.   40 mg, Oral, Daily   Start Date: May 3, 2024            Continue These Medications        Instructions Start Date   albuterol sulfate  (90 Base) MCG/ACT inhaler  Commonly known as: PROVENTIL HFA;VENTOLIN HFA;PROAIR HFA   2 puffs, Inhalation, Every 4 Hours PRN      ferrous sulfate 325 (65 FE) MG tablet   325 mg, Oral, Daily With Breakfast      fluticasone 27.5 MCG/SPRAY nasal spray  Commonly known as: VERAMYST   2 sprays, Nasal, Once As Needed      HYDROcodone-acetaminophen 7.5-325 MG per tablet  Commonly known as: Norco   1 tablet, Oral, Every 6 Hours PRN      lidocaine-prilocaine 2.5-2.5 % cream  Commonly known as: EMLA   1 application , Topical, As Needed      ondansetron 8 MG tablet  Commonly known as: ZOFRAN   8 mg, Oral, 3 Times Daily PRN      OPDIVO IV   Intravenous, Unsurse of dose      pravastatin 80 MG tablet  Commonly known as: PRAVACHOL   80 mg, Oral, Nightly      sildenafil 100 MG tablet  Commonly known as: VIAGRA   50 mg, Oral, Daily PRN      Trelegy Ellipta 100-62.5-25 MCG/ACT inhaler  Generic drug: Fluticasone-Umeclidin-Vilant   1 puff, Inhalation, Daily - RT      vitamin b complex capsule capsule   Oral, Daily             Stop These Medications      cefdinir 300 MG capsule  Commonly known as: OMNICEF     lisinopril 2.5 MG tablet  Commonly known as: PRINIVIL,ZESTRIL              Allergies   Allergen Reactions    Cymbalta [Duloxetine Hcl] GI Intolerance    Gabapentin Mental Status Change     Pt states that this medication \"makes him feel foolish in his head\".     Remeron [Mirtazapine] Other (See Comments)     Excess sedation    Toradol [Ketorolac Tromethamine] GI Intolerance     Projectile vomiting     Latex Other (See Comments)     Latex allergy     Tape Rash       Discharge Disposition: Home  Home or Self Care    Diet:  Hospital:  Diet " Order   Procedures    Diet: Renal; Low Potassium, Low Sodium (2-3g); Fluid Consistency: Thin (IDDSI 0)        Activity:as tolerated      Restrictions or Other Recommendations:  none       CODE STATUS:    Code Status and Medical Interventions:   Ordered at: 04/13/24 0017     Level Of Support Discussed With:    Patient     Code Status (Patient has no pulse and is not breathing):    CPR (Attempt to Resuscitate)     Medical Interventions (Patient has pulse or is breathing):    Full Support       Future Appointments   Date Time Provider Department Center   5/2/2024 11:45 AM ACCESS AND LAB DRAW CHAIR 1  [facility]   5/2/2024  1:00 PM Neetu Ashley MD [facility]   5/2/2024  2:00 PM CHAIR 14  [facility]   7/10/2024 11:00 AM Kayy Box MD [facility]       Additional Instructions for the Follow-ups that You Need to Schedule       Ambulatory Referral to Home Health   As directed      Face to Face Visit Date: 4/19/2024   Follow-up provider for Plan of Care?: I will be treating the patient on an ongoing basis.  Please send me the Plan of Care for signature.   Follow-up provider: LEVON SALINAS [[id number]]   Reason/Clinical Findings: Sepsis   Describe mobility limitations that make leaving home difficult: Impaired mobility   Nursing/Therapeutic Services Requested: Skilled Nursing Physical Therapy Occupational Therapy   Skilled nursing orders: Medication education Pain management Cardiopulmonary assessments   PT orders: Therapeutic exercise Gait Training Strengthening Home safety assessment   Weight Bearing Status: As Tolerated   Occupational orders: Activities of daily living Energy conservation Strengthening   Frequency: 1 Week 1                Roc Rosales MD  04/22/24    Time Spent on Discharge:  I spent  35  minutes on this discharge activity which included: face-to-face encounter with the patient, reviewing the data in the system, coordination of the care with the nursing staff as well as  consultants, documentation, and entering orders.

## 2024-04-23 ENCOUNTER — TRANSITIONAL CARE MANAGEMENT TELEPHONE ENCOUNTER (OUTPATIENT)
Dept: CALL CENTER | Facility: HOSPITAL | Age: 74
End: 2024-04-23
Payer: MEDICARE

## 2024-04-23 LAB
QT INTERVAL: 356 MS
QTC INTERVAL: 472 MS

## 2024-04-23 NOTE — OUTREACH NOTE
Call Center TCM Note      Flowsheet Row Responses   [facility] patient discharged from? [facility]   Does the patient have one of the following disease processes/diagnoses(primary or secondary)? Sepsis   TCM attempt successful? Yes   Call start time 1251   Call end time 1254   Discharge diagnosis Sepsis   Person spoke with today (if not patient) and relationship patient   Meds reviewed with patient/caregiver? Yes   Is the patient having any side effects they believe may be caused by any medication additions or changes? No   Does the patient have all medications related to this admission filled (includes all antibiotics, inhalers, nebulizers,steroids,etc.) Yes   Is the patient taking all medications as directed (includes completed medication regime)? Yes   Comments Has a hospital followup scheduled on 4/26/202 with Dr Drake   Does the patient have an appointment with their PCP within 7-14 days of discharge? Yes   Psychosocial issues? No   Did the patient receive a copy of their discharge instructions? Yes   Nursing interventions Reviewed instructions with patient   What is the patient's perception of their health status since discharge? Improving   Nursing interventions Nurse provided patient education   Is the patient/caregiver able to teach back TIME? T emperature - higher or lower than normal, I nfection - may have signs and symptoms of an infection, M ental Decline - confused, sleepy, difficult to arouse, E xtremely Ill - severe pain, discomfort, shortness of breath   Nursing interventions Nurse provided reassurance to patient   Is patient/caregiver able to teach back steps to recovery at home? Set small, achievable goals for return to baseline health, Rest and regain strength   Is the patient/caregiver able to teach back signs and symptoms of worsening condition: Fever   If the patient is a current smoker, are they able to teach back resources for cessation? Not a smoker   Is the patient/caregiver able to  teach back the hierarchy of who to call/visit for symptoms/problems? PCP, Specialist, Home health nurse, Urgent Care, ED, 911 Yes   TCM call completed? Yes   Wrap up additional comments Patient reports he is getting around houuse ok and had a decent appetite. Has a followup with PCP on 4/26   Call end time 1254   Would this patient benefit from a Referral to [facility] Social Work? No   Is the patient interested in additional calls from an ambulatory ? No            Héctor Conde RN    4/23/2024, 12:55 EDT

## 2024-04-24 ENCOUNTER — HOSPITAL ENCOUNTER (INPATIENT)
Facility: HOSPITAL | Age: 74
LOS: 5 days | Discharge: HOME | DRG: 871 | End: 2024-04-29
Attending: EMERGENCY MEDICINE | Admitting: FAMILY MEDICINE
Payer: MEDICARE

## 2024-04-24 ENCOUNTER — APPOINTMENT (OUTPATIENT)
Dept: GENERAL RADIOLOGY | Facility: HOSPITAL | Age: 74
DRG: 871 | End: 2024-04-24
Payer: MEDICARE

## 2024-04-24 ENCOUNTER — APPOINTMENT (OUTPATIENT)
Dept: CT IMAGING | Facility: HOSPITAL | Age: 74
DRG: 871 | End: 2024-04-24
Payer: MEDICARE

## 2024-04-24 DIAGNOSIS — A41.9 SEPSIS WITHOUT ACUTE ORGAN DYSFUNCTION, DUE TO UNSPECIFIED ORGANISM: ICD-10-CM

## 2024-04-24 DIAGNOSIS — I95.9 HYPOTENSION, UNSPECIFIED HYPOTENSION TYPE: Primary | ICD-10-CM

## 2024-04-24 DIAGNOSIS — D72.829 LEUKOCYTOSIS, UNSPECIFIED TYPE: ICD-10-CM

## 2024-04-24 LAB
ALBUMIN SERPL-MCNC: 3.5 G/DL (ref 3.5–5.2)
ALBUMIN/GLOB SERPL: 0.7 G/DL
ALP SERPL-CCNC: 90 U/L (ref 39–117)
ALT SERPL W P-5'-P-CCNC: 18 U/L (ref 1–41)
ANION GAP SERPL CALCULATED.3IONS-SCNC: 16 MMOL/L (ref 5–15)
AST SERPL-CCNC: 25 U/L (ref 1–40)
B PARAPERT DNA SPEC QL NAA+PROBE: NOT DETECTED
B PERT DNA SPEC QL NAA+PROBE: NOT DETECTED
BACTERIA UR QL AUTO: ABNORMAL /HPF
BASOPHILS # BLD AUTO: 0.11 10*3/MM3 (ref 0–0.2)
BASOPHILS NFR BLD AUTO: 0.3 % (ref 0–1.5)
BILIRUB SERPL-MCNC: 0.3 MG/DL (ref 0–1.2)
BILIRUB UR QL STRIP: NEGATIVE
BUN SERPL-MCNC: 24 MG/DL (ref 8–23)
BUN/CREAT SERPL: 7.4 (ref 7–25)
C PNEUM DNA NPH QL NAA+NON-PROBE: NOT DETECTED
CALCIUM SPEC-SCNC: 9 MG/DL (ref 8.6–10.5)
CHLORIDE SERPL-SCNC: 108 MMOL/L (ref 98–107)
CLARITY UR: CLEAR
CO2 SERPL-SCNC: 16 MMOL/L (ref 22–29)
COLOR UR: YELLOW
CREAT SERPL-MCNC: 3.25 MG/DL (ref 0.76–1.27)
D-LACTATE SERPL-SCNC: 1.6 MMOL/L (ref 0.5–2)
DEPRECATED RDW RBC AUTO: 56.2 FL (ref 37–54)
EGFRCR SERPLBLD CKD-EPI 2021: 19.1 ML/MIN/1.73
EOSINOPHIL # BLD AUTO: 0.04 10*3/MM3 (ref 0–0.4)
EOSINOPHIL NFR BLD AUTO: 0.1 % (ref 0.3–6.2)
ERYTHROCYTE [DISTWIDTH] IN BLOOD BY AUTOMATED COUNT: 15.9 % (ref 12.3–15.4)
FLUAV SUBTYP SPEC NAA+PROBE: NOT DETECTED
FLUBV RNA ISLT QL NAA+PROBE: NOT DETECTED
GEN 5 2HR TROPONIN T REFLEX: 26 NG/L
GLOBULIN UR ELPH-MCNC: 5 GM/DL
GLUCOSE SERPL-MCNC: 115 MG/DL (ref 65–99)
GLUCOSE UR STRIP-MCNC: ABNORMAL MG/DL
HADV DNA SPEC NAA+PROBE: NOT DETECTED
HCOV 229E RNA SPEC QL NAA+PROBE: NOT DETECTED
HCOV HKU1 RNA SPEC QL NAA+PROBE: NOT DETECTED
HCOV NL63 RNA SPEC QL NAA+PROBE: NOT DETECTED
HCOV OC43 RNA SPEC QL NAA+PROBE: NOT DETECTED
HCT VFR BLD AUTO: 30.9 % (ref 37.5–51)
HGB BLD-MCNC: 9.9 G/DL (ref 13–17.7)
HGB UR QL STRIP.AUTO: ABNORMAL
HMPV RNA NPH QL NAA+NON-PROBE: NOT DETECTED
HOLD SPECIMEN: NORMAL
HPIV1 RNA ISLT QL NAA+PROBE: NOT DETECTED
HPIV2 RNA SPEC QL NAA+PROBE: NOT DETECTED
HPIV3 RNA NPH QL NAA+PROBE: NOT DETECTED
HPIV4 P GENE NPH QL NAA+PROBE: NOT DETECTED
HYALINE CASTS UR QL AUTO: ABNORMAL /LPF
IMM GRANULOCYTES # BLD AUTO: 0.38 10*3/MM3 (ref 0–0.05)
IMM GRANULOCYTES NFR BLD AUTO: 1 % (ref 0–0.5)
KETONES UR QL STRIP: NEGATIVE
LEUKOCYTE ESTERASE UR QL STRIP.AUTO: ABNORMAL
LIPASE SERPL-CCNC: 23 U/L (ref 13–60)
LYMPHOCYTES # BLD AUTO: 0.89 10*3/MM3 (ref 0.7–3.1)
LYMPHOCYTES NFR BLD AUTO: 2.3 % (ref 19.6–45.3)
M PNEUMO IGG SER IA-ACNC: NOT DETECTED
MCH RBC QN AUTO: 30.9 PG (ref 26.6–33)
MCHC RBC AUTO-ENTMCNC: 32 G/DL (ref 31.5–35.7)
MCV RBC AUTO: 96.6 FL (ref 79–97)
MONOCYTES # BLD AUTO: 2.01 10*3/MM3 (ref 0.1–0.9)
MONOCYTES NFR BLD AUTO: 5.1 % (ref 5–12)
NEUTROPHILS NFR BLD AUTO: 35.71 10*3/MM3 (ref 1.7–7)
NEUTROPHILS NFR BLD AUTO: 91.2 % (ref 42.7–76)
NITRITE UR QL STRIP: NEGATIVE
NRBC BLD AUTO-RTO: 0 /100 WBC (ref 0–0.2)
PH UR STRIP.AUTO: 7 [PH] (ref 5–8)
PLATELET # BLD AUTO: 384 10*3/MM3 (ref 140–450)
PMV BLD AUTO: 8.5 FL (ref 6–12)
POTASSIUM SERPL-SCNC: 3.8 MMOL/L (ref 3.5–5.2)
PROCALCITONIN SERPL-MCNC: 0.48 NG/ML (ref 0–0.25)
PROT SERPL-MCNC: 8.5 G/DL (ref 6–8.5)
PROT UR QL STRIP: ABNORMAL
QT INTERVAL: 360 MS
QTC INTERVAL: 442 MS
RBC # BLD AUTO: 3.2 10*6/MM3 (ref 4.14–5.8)
RBC # UR STRIP: ABNORMAL /HPF
REF LAB TEST METHOD: ABNORMAL
RHINOVIRUS RNA SPEC NAA+PROBE: NOT DETECTED
RSV RNA NPH QL NAA+NON-PROBE: NOT DETECTED
SARS-COV-2 RNA NPH QL NAA+NON-PROBE: NOT DETECTED
SODIUM SERPL-SCNC: 140 MMOL/L (ref 136–145)
SP GR UR STRIP: 1.01 (ref 1–1.03)
SQUAMOUS #/AREA URNS HPF: ABNORMAL /HPF
TROPONIN T DELTA: -3 NG/L
TROPONIN T SERPL HS-MCNC: 29 NG/L
UROBILINOGEN UR QL STRIP: ABNORMAL
WBC # UR STRIP: ABNORMAL /HPF
WBC NRBC COR # BLD AUTO: 39.14 10*3/MM3 (ref 3.4–10.8)
WHOLE BLOOD HOLD COAG: NORMAL
WHOLE BLOOD HOLD SPECIMEN: NORMAL

## 2024-04-24 PROCEDURE — 25010000002 VANCOMYCIN HCL IN NACL 1.5-0.9 GM/500ML-% SOLUTION: Performed by: EMERGENCY MEDICINE

## 2024-04-24 PROCEDURE — 83690 ASSAY OF LIPASE: CPT | Performed by: EMERGENCY MEDICINE

## 2024-04-24 PROCEDURE — 25010000002 PIPERACILLIN SOD-TAZOBACTAM PER 1 G: Performed by: INTERNAL MEDICINE

## 2024-04-24 PROCEDURE — 87086 URINE CULTURE/COLONY COUNT: CPT | Performed by: EMERGENCY MEDICINE

## 2024-04-24 PROCEDURE — 85025 COMPLETE CBC W/AUTO DIFF WBC: CPT | Performed by: EMERGENCY MEDICINE

## 2024-04-24 PROCEDURE — 84484 ASSAY OF TROPONIN QUANT: CPT | Performed by: EMERGENCY MEDICINE

## 2024-04-24 PROCEDURE — 0202U NFCT DS 22 TRGT SARS-COV-2: CPT | Performed by: EMERGENCY MEDICINE

## 2024-04-24 PROCEDURE — 25810000003 SODIUM CHLORIDE 0.9 % SOLUTION: Performed by: INTERNAL MEDICINE

## 2024-04-24 PROCEDURE — 94640 AIRWAY INHALATION TREATMENT: CPT

## 2024-04-24 PROCEDURE — 25010000002 PIPERACILLIN SOD-TAZOBACTAM PER 1 G: Performed by: EMERGENCY MEDICINE

## 2024-04-24 PROCEDURE — 71045 X-RAY EXAM CHEST 1 VIEW: CPT

## 2024-04-24 PROCEDURE — 36415 COLL VENOUS BLD VENIPUNCTURE: CPT

## 2024-04-24 PROCEDURE — 25810000003 SODIUM CHLORIDE 0.9 % SOLUTION: Performed by: EMERGENCY MEDICINE

## 2024-04-24 PROCEDURE — 99285 EMERGENCY DEPT VISIT HI MDM: CPT

## 2024-04-24 PROCEDURE — 80053 COMPREHEN METABOLIC PANEL: CPT | Performed by: EMERGENCY MEDICINE

## 2024-04-24 PROCEDURE — 87040 BLOOD CULTURE FOR BACTERIA: CPT | Performed by: EMERGENCY MEDICINE

## 2024-04-24 PROCEDURE — 94664 DEMO&/EVAL PT USE INHALER: CPT

## 2024-04-24 PROCEDURE — 83605 ASSAY OF LACTIC ACID: CPT | Performed by: EMERGENCY MEDICINE

## 2024-04-24 PROCEDURE — 74176 CT ABD & PELVIS W/O CONTRAST: CPT

## 2024-04-24 PROCEDURE — 84145 PROCALCITONIN (PCT): CPT | Performed by: EMERGENCY MEDICINE

## 2024-04-24 PROCEDURE — 81001 URINALYSIS AUTO W/SCOPE: CPT | Performed by: EMERGENCY MEDICINE

## 2024-04-24 PROCEDURE — 99223 1ST HOSP IP/OBS HIGH 75: CPT | Performed by: INTERNAL MEDICINE

## 2024-04-24 PROCEDURE — 93005 ELECTROCARDIOGRAM TRACING: CPT | Performed by: EMERGENCY MEDICINE

## 2024-04-24 PROCEDURE — 71250 CT THORAX DX C-: CPT

## 2024-04-24 PROCEDURE — 25010000002 ONDANSETRON PER 1 MG: Performed by: EMERGENCY MEDICINE

## 2024-04-24 PROCEDURE — 25010000002 HEPARIN (PORCINE) PER 1000 UNITS: Performed by: INTERNAL MEDICINE

## 2024-04-24 RX ORDER — ONDANSETRON 2 MG/ML
4 INJECTION INTRAMUSCULAR; INTRAVENOUS EVERY 6 HOURS PRN
Status: DISCONTINUED | OUTPATIENT
Start: 2024-04-24 | End: 2024-04-29 | Stop reason: HOSPADM

## 2024-04-24 RX ORDER — PRAVASTATIN SODIUM 40 MG
80 TABLET ORAL NIGHTLY
Status: DISCONTINUED | OUTPATIENT
Start: 2024-04-24 | End: 2024-04-29 | Stop reason: HOSPADM

## 2024-04-24 RX ORDER — SODIUM CHLORIDE 0.9 % (FLUSH) 0.9 %
10 SYRINGE (ML) INJECTION EVERY 12 HOURS SCHEDULED
Status: DISCONTINUED | OUTPATIENT
Start: 2024-04-24 | End: 2024-04-29 | Stop reason: HOSPADM

## 2024-04-24 RX ORDER — HEPARIN SODIUM 5000 [USP'U]/ML
5000 INJECTION, SOLUTION INTRAVENOUS; SUBCUTANEOUS EVERY 12 HOURS SCHEDULED
Status: DISCONTINUED | OUTPATIENT
Start: 2024-04-24 | End: 2024-04-29 | Stop reason: HOSPADM

## 2024-04-24 RX ORDER — BUDESONIDE AND FORMOTEROL FUMARATE DIHYDRATE 160; 4.5 UG/1; UG/1
2 AEROSOL RESPIRATORY (INHALATION)
Status: DISCONTINUED | OUTPATIENT
Start: 2024-04-24 | End: 2024-04-29 | Stop reason: HOSPADM

## 2024-04-24 RX ORDER — HYDROCODONE BITARTRATE AND ACETAMINOPHEN 7.5; 325 MG/1; MG/1
1 TABLET ORAL EVERY 6 HOURS PRN
Status: DISCONTINUED | OUTPATIENT
Start: 2024-04-24 | End: 2024-04-29 | Stop reason: HOSPADM

## 2024-04-24 RX ORDER — SODIUM CHLORIDE 9 MG/ML
10 INJECTION, SOLUTION INTRAMUSCULAR; INTRAVENOUS; SUBCUTANEOUS AS NEEDED
Status: DISCONTINUED | OUTPATIENT
Start: 2024-04-24 | End: 2024-04-26

## 2024-04-24 RX ORDER — CALCIUM CARBONATE 500 MG/1
2 TABLET, CHEWABLE ORAL 2 TIMES DAILY PRN
Status: DISCONTINUED | OUTPATIENT
Start: 2024-04-24 | End: 2024-04-29 | Stop reason: HOSPADM

## 2024-04-24 RX ORDER — VANCOMYCIN/0.9 % SOD CHLORIDE 1.5G/250ML
20 PLASTIC BAG, INJECTION (ML) INTRAVENOUS ONCE
Status: COMPLETED | OUTPATIENT
Start: 2024-04-24 | End: 2024-04-24

## 2024-04-24 RX ORDER — FLUTICASONE PROPIONATE 50 MCG
2 SPRAY, SUSPENSION (ML) NASAL DAILY
Status: DISCONTINUED | OUTPATIENT
Start: 2024-04-24 | End: 2024-04-29 | Stop reason: HOSPADM

## 2024-04-24 RX ORDER — AMOXICILLIN 250 MG
2 CAPSULE ORAL 2 TIMES DAILY PRN
Status: DISCONTINUED | OUTPATIENT
Start: 2024-04-24 | End: 2024-04-25

## 2024-04-24 RX ORDER — SODIUM CHLORIDE 9 MG/ML
100 INJECTION, SOLUTION INTRAVENOUS CONTINUOUS
Status: ACTIVE | OUTPATIENT
Start: 2024-04-24 | End: 2024-04-25

## 2024-04-24 RX ORDER — BISACODYL 5 MG/1
5 TABLET, DELAYED RELEASE ORAL DAILY PRN
Status: DISCONTINUED | OUTPATIENT
Start: 2024-04-24 | End: 2024-04-25

## 2024-04-24 RX ORDER — SODIUM CHLORIDE 0.9 % (FLUSH) 0.9 %
10 SYRINGE (ML) INJECTION AS NEEDED
Status: DISCONTINUED | OUTPATIENT
Start: 2024-04-24 | End: 2024-04-29 | Stop reason: HOSPADM

## 2024-04-24 RX ORDER — SODIUM CHLORIDE 9 MG/ML
40 INJECTION, SOLUTION INTRAVENOUS AS NEEDED
Status: DISCONTINUED | OUTPATIENT
Start: 2024-04-24 | End: 2024-04-29 | Stop reason: HOSPADM

## 2024-04-24 RX ORDER — ACETAMINOPHEN 325 MG/1
650 TABLET ORAL EVERY 4 HOURS PRN
Status: DISCONTINUED | OUTPATIENT
Start: 2024-04-24 | End: 2024-04-29 | Stop reason: HOSPADM

## 2024-04-24 RX ORDER — BISACODYL 10 MG
10 SUPPOSITORY, RECTAL RECTAL DAILY PRN
Status: DISCONTINUED | OUTPATIENT
Start: 2024-04-24 | End: 2024-04-25

## 2024-04-24 RX ORDER — ACETAMINOPHEN 160 MG/5ML
650 SOLUTION ORAL EVERY 4 HOURS PRN
Status: DISCONTINUED | OUTPATIENT
Start: 2024-04-24 | End: 2024-04-29 | Stop reason: HOSPADM

## 2024-04-24 RX ORDER — AMLODIPINE BESYLATE 5 MG/1
5 TABLET ORAL
Status: DISCONTINUED | OUTPATIENT
Start: 2024-04-24 | End: 2024-04-28

## 2024-04-24 RX ORDER — POLYETHYLENE GLYCOL 3350 17 G/17G
17 POWDER, FOR SOLUTION ORAL DAILY PRN
Status: DISCONTINUED | OUTPATIENT
Start: 2024-04-24 | End: 2024-04-25

## 2024-04-24 RX ORDER — NITROGLYCERIN 0.4 MG/1
0.4 TABLET SUBLINGUAL
Status: DISCONTINUED | OUTPATIENT
Start: 2024-04-24 | End: 2024-04-29 | Stop reason: HOSPADM

## 2024-04-24 RX ORDER — PANTOPRAZOLE SODIUM 40 MG/1
40 TABLET, DELAYED RELEASE ORAL
Status: DISCONTINUED | OUTPATIENT
Start: 2024-04-25 | End: 2024-04-29 | Stop reason: HOSPADM

## 2024-04-24 RX ORDER — ACETAMINOPHEN 650 MG/1
650 SUPPOSITORY RECTAL EVERY 4 HOURS PRN
Status: DISCONTINUED | OUTPATIENT
Start: 2024-04-24 | End: 2024-04-29 | Stop reason: HOSPADM

## 2024-04-24 RX ORDER — ONDANSETRON 4 MG/1
4 TABLET, ORALLY DISINTEGRATING ORAL EVERY 6 HOURS PRN
Status: DISCONTINUED | OUTPATIENT
Start: 2024-04-24 | End: 2024-04-29 | Stop reason: HOSPADM

## 2024-04-24 RX ORDER — FERROUS SULFATE 325(65) MG
325 TABLET ORAL
Status: DISCONTINUED | OUTPATIENT
Start: 2024-04-25 | End: 2024-04-29 | Stop reason: HOSPADM

## 2024-04-24 RX ORDER — ONDANSETRON 2 MG/ML
4 INJECTION INTRAMUSCULAR; INTRAVENOUS ONCE
Status: COMPLETED | OUTPATIENT
Start: 2024-04-24 | End: 2024-04-24

## 2024-04-24 RX ADMIN — SODIUM CHLORIDE 100 ML/HR: 9 INJECTION, SOLUTION INTRAVENOUS at 16:13

## 2024-04-24 RX ADMIN — BUDESONIDE AND FORMOTEROL FUMARATE DIHYDRATE 2 PUFF: 160; 4.5 AEROSOL RESPIRATORY (INHALATION) at 19:45

## 2024-04-24 RX ADMIN — Medication 1500 MG: at 12:05

## 2024-04-24 RX ADMIN — PRAVASTATIN SODIUM 80 MG: 40 TABLET ORAL at 20:52

## 2024-04-24 RX ADMIN — PIPERACILLIN SODIUM AND TAZOBACTAM SODIUM 3.38 G: 3; .375 INJECTION, POWDER, LYOPHILIZED, FOR SOLUTION INTRAVENOUS at 17:34

## 2024-04-24 RX ADMIN — Medication 10 ML: at 20:52

## 2024-04-24 RX ADMIN — SODIUM CHLORIDE 1000 ML: 9 INJECTION, SOLUTION INTRAVENOUS at 09:37

## 2024-04-24 RX ADMIN — ONDANSETRON 4 MG: 2 INJECTION INTRAMUSCULAR; INTRAVENOUS at 09:37

## 2024-04-24 RX ADMIN — SODIUM CHLORIDE 1000 ML: 9 INJECTION, SOLUTION INTRAVENOUS at 11:53

## 2024-04-24 RX ADMIN — PIPERACILLIN SODIUM AND TAZOBACTAM SODIUM 3.38 G: 3; .375 INJECTION, POWDER, LYOPHILIZED, FOR SOLUTION INTRAVENOUS at 11:24

## 2024-04-24 RX ADMIN — HEPARIN SODIUM 5000 UNITS: 5000 INJECTION INTRAVENOUS; SUBCUTANEOUS at 20:52

## 2024-04-24 NOTE — Clinical Note
Level of Care: Telemetry [5]   Diagnosis: Sepsis [[id number]]   Admitting Physician: MARY CARLSON [[id number]]   Attending Physician: MARY CARLSON [[id number]]   Certification: I Certify That Inpatient Hospital Services Are Medically Necessary For Greater Than 2 Midnights

## 2024-04-24 NOTE — PLAN OF CARE
Problem: Adult Inpatient Plan of Care  Goal: Plan of Care Review  Outcome: Ongoing, Progressing  Flowsheets (Taken 4/24/2024 1435)  Progress: improving  Plan of Care Reviewed With: patient  Goal: Patient-Specific Goal (Individualized)  Outcome: Ongoing, Progressing  Goal: Absence of Hospital-Acquired Illness or Injury  Outcome: Ongoing, Progressing  Goal: Optimal Comfort and Wellbeing  Outcome: Ongoing, Progressing  Goal: Readiness for Transition of Care  Outcome: Ongoing, Progressing  Intervention: Mutually Develop Transition Plan  Recent Flowsheet Documentation  Taken 4/24/2024 1433 by Raulito Rubin, RN  Transportation Anticipated: family or friend will provide  Patient/Family Anticipated Services at Transition: none  Patient/Family Anticipates Transition to: home with family  Taken 4/24/2024 1429 by Raulito Rubin, RN  Equipment Currently Used at Home: shower chair     Problem: Pain Acute  Goal: Acceptable Pain Control and Functional Ability  Outcome: Ongoing, Progressing     Problem: Infection  Goal: Absence of Infection Signs and Symptoms  Outcome: Ongoing, Progressing     Problem: Fall Injury Risk  Goal: Absence of Fall and Fall-Related Injury  Outcome: Ongoing, Progressing   Goal Outcome Evaluation:  Plan of Care Reviewed With: patient        Progress: improving

## 2024-04-24 NOTE — H&P
[facility] Medicine Services  HISTORY AND PHYSICAL    Patient Name: David Barfield  : 1950  MRN: [id number]  Primary Care Physician: Shahid Drake MD  Date of admission: 2024      Subjective   Subjective     Chief Complaint:  Weakness, nausea/vomiting     HPI:  David Barfield is a 74 y.o. male with history of non-small cell lung ca s/p neoadjuvant chemoimmunotherapy and RLL lobectomy currently on Opdivo (last 24), HTN, emphysema, current tobacco use, recent admission (-)  for sepsis related to pyelonephritis who presented back with vague abd pain, nausea/vomiting and weakness. States he did ok after DC. Ate/drank and walked fine yesterday. This morning he woke up with vague abd pain down both sides of his abd. He did vomit twice. Felt weak. Concern for dehydration/syncope that has happened in the past per wife so EMS was called.     Denies diarrhea. Abd pain currently improved. Denies dysuria. Cough but states it is his usual cough. Denies URI symptoms.       Personal History     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    Diabetes mellitus Borderline    Emphysema/COPD     Erectile disorder     GERD (gastroesophageal reflux disease)     History of chemotherapy     Hyperlipidemia     Hypertension 2019    Lung nodule     Mumps     Mumps     Pruritus     after bath    Slow to wake up after anesthesia     Wears dentures     upper only    Wears hearing aid in both ears     usually only wears right         Oncology Problem List:  Lung cancer (2024; Status: Resolved)  Bronchogenic cancer of right lung (10/04/2023; Status: Active)  Malignant neoplasm of lower lobe of right lung (10/04/2023; Status:   Active)  Oncology/Hematology History   Malignant neoplasm of lower lobe of right lung   10/4/2023 Initial Diagnosis    Malignant neoplasm of lower  lobe of right lung     10/4/2023 Cancer Staged    Staging form: Lung, AJCC 8th Edition  - Clinical: Stage IIIA (cT2b, cN2, cM0) - Signed by Neetu Ashley MD on 10/4/2023     10/23/2023 - 12/5/2023 Chemotherapy    OP LUNG  Nivolumab 360mg /  PACLitaxel / CARBOplatin AUC=5      10/23/2023 -  Chemotherapy    OP CENTRAL VENOUS ACCESS DEVICE Access, Care, and Maintenance (CVAD)     2/6/2024 - 2/27/2024 Chemotherapy    OP LUNG Atezolizumab       Past Surgical History:   Procedure Laterality Date    BONE BIOPSY      broken bone surgery in his face    BRONCHOSCOPY THORACOTOMY Right 1/9/2024    Procedure: THORACOTOMY FOR LOWER LOBECTOMY AND MEDISTINAL LYMPH NODE DISSECTION RIGHT;  Surgeon: Joey Patel MD;  Location:  [facility] OR;  Service: Cardiothoracic;  Laterality: Right;    BRONCHOSCOPY WITH ION ROBOTIC ASSIST N/A 09/15/2023    Procedure: BRONCHOSCOPY NAVIGATION WITH ENDOBRONCHIAL ULTRASOUND AND ION ROBOT;  Surgeon: Octaviano Sampson MD;  Location: [facility] ENDOSCOPY;  Service: Robotics - Pulmonary;  Laterality: N/A;  ion #6 - 0032  - 0015  Cath guide 0061    EBUS balloon removed and intact    CARDIAC ELECTROPHYSIOLOGY PROCEDURE N/A 08/17/2021    Procedure: Pacemaker DC new;  Surgeon: Kayy Box MD;  Location: [facility] CATH INVASIVE LOCATION;  Service: Cardiology;  Laterality: N/A;    FACIAL FRACTURE SURGERY      PACEMAKER IMPLANTATION         Family History: family history includes Aneurysm in his mother; Dementia in his father; Heart disease in his paternal grandmother; Hypertension in his paternal grandfather; Leukemia in his sister.     Social History:  reports that he has quit smoking. His smoking use included cigarettes. He started smoking about 56 years ago. He has a 28.7 pack-year smoking history. He has never used smokeless tobacco. He reports that he does not drink alcohol and does not use drugs.  Social History     Social History Narrative    Lives in [place], [place]  "      Medications:  Available home medication information reviewed.  Fluticasone-Umeclidin-Vilant, HYDROcodone-acetaminophen, albuterol sulfate HFA, amLODIPine, ferrous sulfate, fluticasone, lidocaine-prilocaine, omeprazole, ondansetron, pravastatin, sildenafil, and vitamin b complex    Allergies   Allergen Reactions    Cymbalta [Duloxetine Hcl] GI Intolerance    Gabapentin Mental Status Change     Pt states that this medication \"makes him feel foolish in his head\".     Remeron [Mirtazapine] Other (See Comments)     Excess sedation    Toradol [Ketorolac Tromethamine] GI Intolerance     Projectile vomiting     Latex Other (See Comments)     Latex allergy     Tape Rash       Objective   Objective     Vital Signs:   Temp:  [98.3 °F (36.8 °C)-98.4 °F (36.9 °C)] 98.4 °F (36.9 °C)  Heart Rate:  [] 72  Resp:  [16] 16  BP: ()/(54-76) 96/59       Physical Exam   Constitutional: Awake, alert; chronically ill appearing   Eyes: PERRLA, sclerae anicteric, no conjunctival injection  HENT: NCAT, mucous membranes moist  Neck: Supple, no thyromegaly, no lymphadenopathy, trachea midline  Respiratory: diminished in the bases; no wheeze/crackles   Cardiovascular: RRR, no murmurs, rubs, or gallops, palpable pedal pulses bilaterally  Gastrointestinal: Positive bowel sounds, soft, nontender, nondistended  Musculoskeletal: No bilateral ankle edema, no clubbing or cyanosis to extremities  Psychiatric: Appropriate affect, cooperative  Neurologic: Oriented x 3, strength symmetric in all extremities, Cranial Nerves grossly intact to confrontation, speech clear  Skin: No rashes      Result Review:  I have personally reviewed the results from the time of this admission to 4/24/2024 15:07 EDT and agree with these findings:  []  Laboratory list / accordion  []  Microbiology  []  Radiology  []  EKG/Telemetry   []  Cardiology/Vascular   []  Pathology  []  Old records  []  Other:  Most notable findings include:       LAB RESULTS:      Lab " 04/24/24  0922 04/20/24  0528 04/19/24 0323 04/18/24  0333   WBC 39.14* 10.87* 10.31 10.44   HEMOGLOBIN 9.9* 8.0* 7.6* 7.7*   HEMATOCRIT 30.9* 24.8* 23.4* 23.2*   PLATELETS 384 280 219 181   NEUTROS ABS 35.71* 7.69* 7.36* 7.52*   IMMATURE GRANS (ABS) 0.38* 0.12* 0.13* 0.08*   LYMPHS ABS 0.89 1.33 1.35 1.31   MONOS ABS 2.01* 1.41* 1.23* 1.28*   EOS ABS 0.04 0.28 0.21 0.23   MCV 96.6 94.3 93.2 91.3   PROCALCITONIN 0.48*  --   --   --    LACTATE 1.6  --   --   --          Lab 04/24/24  0922 04/22/24  0715 04/21/24 0403 04/20/24  0528 04/19/24 0323 04/18/24  0333   SODIUM 140 142 141 140 139 137   POTASSIUM 3.8 4.0 4.0 4.1 4.2 4.0   CHLORIDE 108* 108* 109* 107 110* 105   CO2 16.0* 19.0* 17.0* 19.0* 17.0* 19.0*   ANION GAP 16.0* 15.0 15.0 14.0 12.0 13.0   BUN 24* 26* 32* 38* 45* 54*   CREATININE 3.25* 4.19* 5.22* 6.41* 7.29* 8.45*   EGFR 19.1* 14.1* 10.8* 8.4* 7.2* 6.1*   GLUCOSE 115* 101* 93 97 103* 105*   CALCIUM 9.0 8.7 8.5* 8.3* 8.0* 8.2*   PHOSPHORUS  --  4.5 4.8* 5.2* 4.4 4.5         Lab 04/24/24  0922 04/22/24  0715 04/21/24  0403 04/20/24  0528 04/19/24  0323   TOTAL PROTEIN 8.5  --   --   --   --    ALBUMIN 3.5 3.3* 2.9* 2.9* 2.8*   GLOBULIN 5.0  --   --   --   --    ALT (SGPT) 18  --   --   --   --    AST (SGOT) 25  --   --   --   --    BILIRUBIN 0.3  --   --   --   --    ALK PHOS 90  --   --   --   --    LIPASE 23  --   --   --   --          Lab 04/24/24  1125 04/24/24  0922   HSTROP T 26* 29*                 UA          4/12/2024    22:47 4/24/2024    10:16   Urinalysis   Squamous Epithelial Cells, UA None Seen  0-2    Specific Gravity, UA 1.015  1.011    Ketones, UA Negative  Negative    Blood, UA Small (1+)  Moderate (2+)    Leukocytes, UA Large (3+)  Small (1+)    Nitrite, UA Negative  Negative    RBC, UA 6-10  6-10    WBC, UA Too Numerous to Count  11-20    Bacteria, UA None Seen  None Seen        Microbiology Results (last 10 days)       Procedure Component Value - Date/Time    Respiratory Panel PCR  w/COVID-19(SARS-CoV-2) [facility] In-House, NP Swab in UTM/VTM, 2 HR TAT - Swab, Nasopharynx [[id number]]  (Normal) Collected: 04/24/24 1016    Lab Status: Final result Specimen: Swab from Nasopharynx Updated: 04/24/24 1115     ADENOVIRUS, PCR Not Detected     Coronavirus 229E Not Detected     Coronavirus HKU1 Not Detected     Coronavirus NL63 Not Detected     Coronavirus OC43 Not Detected     COVID19 Not Detected     Human Metapneumovirus Not Detected     Human Rhinovirus/Enterovirus Not Detected     Influenza A PCR Not Detected     Influenza B PCR Not Detected     Parainfluenza Virus 1 Not Detected     Parainfluenza Virus 2 Not Detected     Parainfluenza Virus 3 Not Detected     Parainfluenza Virus 4 Not Detected     RSV, PCR Not Detected     Bordetella pertussis pcr Not Detected     Bordetella parapertussis PCR Not Detected     Chlamydophila pneumoniae PCR Not Detected     Mycoplasma pneumo by PCR Not Detected    Narrative:      In the setting of a positive respiratory panel with a viral infection PLUS a negative procalcitonin without other underlying concern for bacterial infection, consider observing off antibiotics or discontinuation of antibiotics and continue supportive care. If the respiratory panel is positive for atypical bacterial infection (Bordetella pertussis, Chlamydophila pneumoniae, or Mycoplasma pneumoniae), consider antibiotic de-escalation to target atypical bacterial infection.            CT Abdomen Pelvis Without Contrast    Result Date: 4/24/2024  CT ABDOMEN PELVIS WO CONTRAST Date of Exam: 4/24/2024 12:13 PM EDT Indication: Sepsis, source unclear. Comparison: 4/12/2024 Technique: Axial CT images were obtained of the abdomen and pelvis without the administration of contrast. Reconstructed coronal and sagittal images were also obtained. Automated exposure control and iterative construction methods were used. Findings: Included lung bases are unchanged in appearance from prior CT.  No new abnormality. No suspicious hepatic lesion. Gallbladder is unremarkable. No significant biliary ductal dilation. The spleen, pancreas, and bilateral adrenal glands are unchanged. There is significantly decreased perinephric fat stranding about the bilateral kidneys. There is no evidence of hydronephrosis or nephrolithiasis. No suspicious renal lesion. Small hiatal hernia. No bowel obstruction. Normal appendix. A few scattered colonic diverticuli without evidence of diverticulitis. No suspicious lymphadenopathy. Dense atherosclerotic calcifications of the aorta and branch vessels. Decreased urinary bladder wall thickening which may be in part secondary to increased distention. Prostatomegaly. Abdominal and pelvic wall soft tissues show no acute abnormality. There is no acute fracture or suspicious osseous lesion.     Impression: Impression: No new discrete abnormality of the abdomen or pelvis as compared to prior CT. Significantly decreased perinephric fat stranding about the bilateral kidneys. Decreased urinary bladder wall thickening which may be in part secondary to increased distention compared to prior exam. Electronically Signed: Raulito Light MD  4/24/2024 12:47 PM EDT  Workstation ID: [id number]    CT Chest Without Contrast Diagnostic    Result Date: 4/24/2024  CT CHEST WO CONTRAST DIAGNOSTIC Date of Exam: 4/24/2024 12:13 PM EDT Indication: Cough, sepsis, unclear source. Comparison: CT chest 4/12/2024 Technique: Axial CT images were obtained of the chest without contrast administration.  Reconstructed coronal and sagittal images were also obtained. Automated exposure control and iterative construction methods were used. Findings: MEDIASTINUM: The heart size is stable. Trace pericardial fluid. Right chest wall AICD. Left chest wall infusion port with tip in the SVC. CORONARY ARTERIES: There is calcified atherosclerotic disease. LUNGS: There are postsurgical changes of the right lower lung with inferomedial  scarring posteriorly. There is a loculated right pleural effusion tracking into the fissure, similar to the previous study. There is diffuse emphysema with biapical scarring. LYMPH NODES: Mildly prominent mediastinal lymph nodes are unchanged. There are small calcified subcarinal and left hilar nodes. OSSEOUS STRUCTURES: Multilevel thoracic degenerative disc disease. Stable healing right-sided rib fractures. No acute osseous abnormality.     Impression: Impression: 1. Stable postsurgical changes of the right lower lung with scarring and chronic appearing loculated right pleural effusion. 2. Mild emphysema and biapical scarring. Electronically Signed: Aimee Freitas MD  4/24/2024 12:37 PM EDT  Workstation ID: [id number]    XR Chest 1 View    Result Date: 4/24/2024  XR CHEST 1 VW Date of Exam: 4/24/2024 10:15 AM EDT Indication: Sepsis, cough Comparison: 4/12/2024 Findings: Cardiomediastinal silhouette is stable. There are postsurgical changes of the right lung with a small chronic effusion. Left lung is clear. Left-sided port catheter with the tip in the SVC. Right AICD. No acute osseous abnormality identified.     Impression: Impression: Stable chronic appearance of the chest. No acute cardiopulmonary abnormality. Electronically Signed: Aimee Freitas MD  4/24/2024 10:37 AM EDT  Workstation ID: [id number]     Results for orders placed during the hospital encounter of 07/09/21    Adult Transthoracic Echo Complete W/ Cont if Necessary Per Protocol    Interpretation Summary  · Estimated left ventricular EF = 55% Left ventricular systolic function is normal.  · Left ventricular diastolic function was normal.  · Left ventricular wall thickness is consistent with mild concentric hypertrophy.  · Trace mitral and tricuspid regurgitation.      Assessment & Plan   Assessment & Plan       Sepsis    Presence of cardiac pacemaker    Mixed hyperlipidemia    Tobacco abuse    Bronchogenic cancer of right lung    Malignant neoplasm of  lower lobe of right lung    ARACELI (acute kidney injury)    Severe malnutrition    Leukocytosis        David Barfield is a 74 y.o. male with history of non-small cell lung ca s/p neoadjuvant chemoimmunotherapy and RLL lobectomy currently on Opdivo (last 4/4/24), HTN, emphysema, presence of port and pacemaker, current tobacco use, recent admission (4/12-4/22)  for sepsis related to pyelonephritis who presented back with vague abd pain, nausea/vomiting and weakness. Initial labs with WBC 39, procal 0.48, Cr 3.25. CT chest with no acute findings. CT abd/pel with no new findings, improved perinephric fat stranding about the bilateral kidneys.     Sepsis -- leukocytosis, hypotension, presumed urinary source   Recent admission for pyelonephritis   - Last admission, Ucx and Bcx negative; imaging for pyelo   - Completed 10 days of cefepime -- WBC up to 46 but normalized during admission. WBC 10.87 on 4/20 and 39 on admission   - Bcx pending, Ucx pending; resp PCR negative   - zosyn/vanc   - IVF   - Consult ID in AM     ARACELI on CKD 3  - ARACELI on last admission. Neph followed at that time  - Baseline Cr 0.9-1.1. Peaked to 8.45 last admission  - Cr continues to improve.   - IVF    HTN   - borderline BP in the ER. Holding amlodipine.     Non-small cell lung cancer  - Follows with Dr. Ashley. Last Opdivo treatment 4/4     Anemia  - Improved. Follow up outpatient with hematology     DVT prophylaxis:  Medical DVT prophylaxis orders are present.      CODE STATUS:    Code Status and Medical Interventions:   Ordered at: 04/24/24 1519     Code Status (Patient has no pulse and is not breathing):    CPR (Attempt to Resuscitate)     Medical Interventions (Patient has pulse or is breathing):    Full Support       Expected Discharge   Expected discharge date/ time has not been documented.     Mary Mitchell,   04/24/24

## 2024-04-24 NOTE — PROGRESS NOTES
Pharmacy Consult-Vancomycin Dosing  David Barfield is a  74 y.o. male receiving vancomycin therapy.     Indication:  Sepsis  Consulting Provider:  Stephen IYP Consult: No    Goal Trough:  15-20 mcg/mL    Current Antimicrobial Therapy  Anti-Infectives (From admission, onward)      Ordered     Dose/Rate Route Frequency Start Stop    04/24/24 1547  piperacillin-tazobactam (ZOSYN) 3.375 g IVPB in 100 mL NS MBP (CD)        Ordering Provider: Mary Mitchell DO    3.375 g  over 4 Hours Intravenous Every 8 Hours 04/24/24 2245 04/29/24 2244    04/24/24 1547  piperacillin-tazobactam (ZOSYN) 3.375 g IVPB in 100 mL NS MBP (CD)        Ordering Provider: Mary Mitchell DO    3.375 g  over 30 Minutes Intravenous Once 04/24/24 1645      04/24/24 1547  Pharmacy to dose vancomycin        Ordering Provider: Mary Mitchell DO     Does not apply Continuous PRN 04/24/24 1547 04/29/24 1546    04/24/24 1007  vancomycin IVPB 1500 mg in 0.9% NaCl (Premix) 500 mL        Ordering Provider: Bertrand Lares MD    20 mg/kg × 73.5 kg  333.3 mL/hr over 90 Minutes Intravenous Once 04/24/24 1023 04/24/24 1335    04/24/24 1007  piperacillin-tazobactam (ZOSYN) 3.375 g IVPB in 100 mL NS MBP (CD)        Ordering Provider: Bertrand Lares MD    3.375 g Intravenous Once 04/24/24 1023 04/24/24 1204            Allergies  Allergies as of 04/24/2024 - Reviewed 04/24/2024   Allergen Reaction Noted    Cymbalta [duloxetine hcl] GI Intolerance 01/30/2024    Gabapentin Mental Status Change 01/22/2024    Remeron [mirtazapine] Other (See Comments) 03/27/2024    Toradol [ketorolac tromethamine] GI Intolerance 01/18/2024    Latex Other (See Comments) 10/23/2023    Tape Rash 10/20/2023       Labs    Results from last 7 days   Lab Units 04/24/24  0922 04/22/24  0715 04/21/24  0403   BUN mg/dL 24* 26* 32*   CREATININE mg/dL 3.25* 4.19* 5.22*       Results from last 7 days   Lab Units 04/24/24  0922 04/20/24  0528 04/19/24  0323   WBC 10*3/mm3 39.14* 10.87*  "10.31       Evaluation of Dosing     Last Dose Received in the ED/Outside Facility: 1500mg IV x 1 (given in ED)  Is Patient on Dialysis or Renal Replacement: No    Ht - 182.9 cm (72\")  Wt - 79.6 kg (175 lb 8 oz)    Estimated Creatinine Clearance: 22.1 mL/min (A) (by C-G formula based on SCr of 3.25 mg/dL (H)).    Intake & Output (last 3 days)         04/21 0701 04/22 0700 04/22 0701 04/23 0700 04/23 0701 04/24 0700 04/24 0701 04/25 0700    IV Piggyback    2100    Total Intake(mL/kg)    2100 (26.4)    Net    +2100                    Microbiology and Radiology  Microbiology Results (last 10 days)       Procedure Component Value - Date/Time    Respiratory Panel PCR w/COVID-19(SARS-CoV-2) [facility] In-House, NP Swab in UTM/VTM, 2 HR TAT - Swab, Nasopharynx [[id number]]  (Normal) Collected: 04/24/24 1016    Lab Status: Final result Specimen: Swab from Nasopharynx Updated: 04/24/24 1115     ADENOVIRUS, PCR Not Detected     Coronavirus 229E Not Detected     Coronavirus HKU1 Not Detected     Coronavirus NL63 Not Detected     Coronavirus OC43 Not Detected     COVID19 Not Detected     Human Metapneumovirus Not Detected     Human Rhinovirus/Enterovirus Not Detected     Influenza A PCR Not Detected     Influenza B PCR Not Detected     Parainfluenza Virus 1 Not Detected     Parainfluenza Virus 2 Not Detected     Parainfluenza Virus 3 Not Detected     Parainfluenza Virus 4 Not Detected     RSV, PCR Not Detected     Bordetella pertussis pcr Not Detected     Bordetella parapertussis PCR Not Detected     Chlamydophila pneumoniae PCR Not Detected     Mycoplasma pneumo by PCR Not Detected    Narrative:      In the setting of a positive respiratory panel with a viral infection PLUS a negative procalcitonin without other underlying concern for bacterial infection, consider observing off antibiotics or discontinuation of antibiotics and continue supportive care. If the respiratory panel is positive for atypical bacterial " infection (Bordetella pertussis, Chlamydophila pneumoniae, or Mycoplasma pneumoniae), consider antibiotic de-escalation to target atypical bacterial infection.            Vancomycin Levels:                      Assessment/Plan:  Will pulse dose vancomycin for now due to renal decline.  Vancomycin 1500mg IV x 1 given in ED.  Will check vancomycin random level in AM to assess further dosing.  MRSA PCR ordered.  Pharmacy will continue to follow and adjust dose based on renal function, drug levels, and clinical status.    Thanks,  Chris Bradshaw, PharmD, BCPS, BCCCP  04/24/24  16:00 EDT

## 2024-04-24 NOTE — ED PROVIDER NOTES
"Subjective   History of Present Illness  Mr. Barfield presents via EMS with weakness and vomiting.  He tells me this is how he felt when he was admitted with sepsis here recently.  This started today.  EMS noted an initial blood pressure of 91/50.  They gave to 50 cc saline.  He denies fevers or chills.  He tells me his appetite has not been very good since getting out of the hospital a few days ago.  He had normal bowel movement today.  Has history of COPD and non-small cell lung cancer.  He has had lobectomy in January.  He has had chemotherapy and is currently on immunotherapy.  He was admitted here on the 12th through the 22nd of this month with severe sepsis from UTI.  He completed 10 days of cefepime.  When I ask if he is having any pain anywhere he tells me he has had some mild discomfort on both sides of his abdomen over the last couple of days.      Review of Systems    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    Diabetes mellitus Borderline    Emphysema/COPD     Erectile disorder     GERD (gastroesophageal reflux disease)     History of chemotherapy     Hyperlipidemia     Hypertension 2019    Lung nodule     Mumps     Mumps     Pruritus     after bath    Slow to wake up after anesthesia     Wears dentures     upper only    Wears hearing aid in both ears     usually only wears right       Allergies   Allergen Reactions    Cymbalta [Duloxetine Hcl] GI Intolerance    Gabapentin Mental Status Change     Pt states that this medication \"makes him feel foolish in his head\".     Remeron [Mirtazapine] Other (See Comments)     Excess sedation    Toradol [Ketorolac Tromethamine] GI Intolerance     Projectile vomiting     Latex Other (See Comments)     Latex allergy     Tape Rash       Past Surgical History:   Procedure Laterality Date    BONE BIOPSY      broken bone surgery in his face    BRONCHOSCOPY " THORACOTOMY Right 1/9/2024    Procedure: THORACOTOMY FOR LOWER LOBECTOMY AND MEDISTINAL LYMPH NODE DISSECTION RIGHT;  Surgeon: Joey Patel MD;  Location: [facility] OR;  Service: Cardiothoracic;  Laterality: Right;    BRONCHOSCOPY WITH ION ROBOTIC ASSIST N/A 09/15/2023    Procedure: BRONCHOSCOPY NAVIGATION WITH ENDOBRONCHIAL ULTRASOUND AND ION ROBOT;  Surgeon: Octaviano Sampson MD;  Location: [facility] ENDOSCOPY;  Service: Robotics - Pulmonary;  Laterality: N/A;  ion #6 - 0032  - 0015  Cath guide [id number]    EBUS balloon removed and intact    CARDIAC ELECTROPHYSIOLOGY PROCEDURE N/A 08/17/2021    Procedure: Pacemaker DC new;  Surgeon: Kayy Box MD;  Location: [facility] CATH INVASIVE LOCATION;  Service: Cardiology;  Laterality: N/A;    FACIAL FRACTURE SURGERY      PACEMAKER IMPLANTATION         Family History   Problem Relation Age of Onset    Aneurysm Mother         brain    Dementia Father     Leukemia Sister     Heart disease Paternal Grandmother     Hypertension Paternal Grandfather        Social History     Socioeconomic History    Marital status: Significant Other    Number of children: 3   Tobacco Use    Smoking status: Former     Current packs/day: 0.50     Average packs/day: 0.5 packs/day for 56.3 years (28.7 ttl pk-yrs)     Types: Cigarettes     Start date: 1/1/1968    Smokeless tobacco: Never    Tobacco comments:     Pt states that he quit smoking on 1/8/24. The day before his sx.    Vaping Use    Vaping status: Never Used   Substance and Sexual Activity    Alcohol use: Never    Drug use: Never    Sexual activity: Defer     Partners: Female     Birth control/protection: None           Objective   Physical Exam  Vitals and nursing note reviewed.   Constitutional:       General: He is not in acute distress.     Appearance: Normal appearance.   HENT:      Head: Normocephalic and atraumatic.      Nose: Nose normal. No congestion or rhinorrhea.      Mouth/Throat:      Mouth: Mucous membranes are dry.    Eyes:      General: No scleral icterus.     Conjunctiva/sclera: Conjunctivae normal.   Neck:      Comments: No JVD   Cardiovascular:      Rate and Rhythm: Normal rate and regular rhythm.      Heart sounds: No murmur heard.     No friction rub.   Pulmonary:      Effort: Pulmonary effort is normal.      Breath sounds: Normal breath sounds. No wheezing or rales.   Abdominal:      General: Bowel sounds are normal.      Palpations: Abdomen is soft.      Tenderness: There is no abdominal tenderness. There is no guarding or rebound.   Musculoskeletal:         General: No tenderness.      Cervical back: Normal range of motion and neck supple.      Right lower leg: No edema.      Left lower leg: No edema.   Skin:     General: Skin is warm and dry.      Coloration: Skin is not pale.      Findings: No erythema.   Neurological:      General: No focal deficit present.      Mental Status: He is alert and oriented to person, place, and time.      Motor: No weakness.      Coordination: Coordination normal.   Psychiatric:         Mood and Affect: Mood normal.         Behavior: Behavior normal.         Thought Content: Thought content normal.         Procedures           ED Course  ED Course as of 04/24/24 1457   Wed Apr 24, 2024   0925 Have reviewed and interpreted prior records as above. [DT]   0956 EKG computer indicates suspicion for pacemaker failure.  He has history of sick sinus syndrome and had a pacemaker implanted in 2021.  Is not clear to me based on the EKG what constitutes the pacemaker failure.  I have reviewed his record and it looks like Dr. Box interrogated it from time to time with the last being in January.  I have called her office and they will interrogate his pacemaker.  White blood cell count 39,000 which is new.  91% neutrophils.  I have ordered blood cultures. [DT]   1006 Wife advises me that he has been coughing since yesterday.  Have added PCR panel and chest x-ray [DT]   1126 Urinalysis not  impressive, will culture it.  Chest x-ray negative.  PCR panel negative.  Procalcitonin mildly elevated [DT]   1305 101/57.  CT chest abdomen pelvis negative for anything acute, the previously seen thickened bladder and renal stranding has resolved.  Second troponin not trending upward.  Have conferred with the cardiovascular tech who has interrogated his pacemaker and there has been no malfunction. [DT]      ED Course User Index  [DT] Bertrand Lares MD                                             Medical Decision Making  Please see course notes.  I reviewed and interpreted prior records.  Obtained history from his wife as well as the patient.  Ordered and interpreted multiple labs.  Gave multiple bags of IV fluids.  Ordered and interpreted CAT scans of his chest abdomen and pelvis.  Ordered sepsis antibiotics.    Problems Addressed:  Hypotension, unspecified hypotension type: complicated acute illness or injury that poses a threat to life or bodily functions  Leukocytosis, unspecified type: complicated acute illness or injury that poses a threat to life or bodily functions  Sepsis without acute organ dysfunction, due to unspecified organism: complicated acute illness or injury that poses a threat to life or bodily functions    Amount and/or Complexity of Data Reviewed  External Data Reviewed: radiology and notes.  Labs: ordered. Decision-making details documented in ED Course.  Radiology: ordered. Decision-making details documented in ED Course.  ECG/medicine tests: ordered. Decision-making details documented in ED Course.    Risk  Prescription drug management.  Decision regarding hospitalization.        Final diagnoses:   Hypotension, unspecified hypotension type   Sepsis without acute organ dysfunction, due to unspecified organism   Leukocytosis, unspecified type       ED Disposition  ED Disposition       ED Disposition   Decision to Admit    Condition   --    Comment   Level of Care: Telemetry [5]   Diagnosis:  Sepsis [[id number]]   Admitting Physician: MARY CARLSON [[id number]]   Attending Physician: MARY CARLSON [[id number]]                 No follow-up provider specified.       Medication List      No changes were made to your prescriptions during this visit.            Bertrand Lares MD  04/24/24 8321

## 2024-04-24 NOTE — ED NOTES
David Barfield    Nursing Report ED to Floor:  Mental status: WDL  Ambulatory status: WITH STANDBY BY ASSIST  Oxygen Therapy:  RA  Cardiac Rhythm: NSR  Admitted from: HOME  Safety Concerns:  FALL  Social Issues: NONE  ED Room #:  15    ED Nurse Phone Extension - [phone] or may call [phone].      HPI:   Chief Complaint   Patient presents with    Vomiting       Past Medical History:  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    Diabetes mellitus Borderline    Emphysema/COPD     Erectile disorder     GERD (gastroesophageal reflux disease)     History of chemotherapy     Hyperlipidemia     Hypertension 2019    Lung nodule     Mumps     Mumps     Pruritus     after bath    Slow to wake up after anesthesia     Wears dentures     upper only    Wears hearing aid in both ears     usually only wears right        Past Surgical History:  Past Surgical History:   Procedure Laterality Date    BONE BIOPSY      broken bone surgery in his face    BRONCHOSCOPY THORACOTOMY Right 1/9/2024    Procedure: THORACOTOMY FOR LOWER LOBECTOMY AND MEDISTINAL LYMPH NODE DISSECTION RIGHT;  Surgeon: Joey Patel MD;  Location: [facility] OR;  Service: Cardiothoracic;  Laterality: Right;    BRONCHOSCOPY WITH ION ROBOTIC ASSIST N/A 09/15/2023    Procedure: BRONCHOSCOPY NAVIGATION WITH ENDOBRONCHIAL ULTRASOUND AND ION ROBOT;  Surgeon: Octaviano Sampson MD;  Location: [facility] ENDOSCOPY;  Service: Robotics - Pulmonary;  Laterality: N/A;  ion #6 - 0032  - 0015  Cath guide [id number]    EBUS balloon removed and intact    CARDIAC ELECTROPHYSIOLOGY PROCEDURE N/A 08/17/2021    Procedure: Pacemaker DC new;  Surgeon: Kayy Box MD;  Location: [facility] CATH INVASIVE LOCATION;  Service: Cardiology;  Laterality: N/A;    FACIAL FRACTURE SURGERY      PACEMAKER IMPLANTATION          Admitting Doctor:   Mary Mitchell DO    Consulting  Provider(s):  Consults       Date and Time Order Name Status Description    4/13/2024  2:55 AM Inpatient Infectious Diseases Consult Completed              Admitting Diagnosis:   The primary encounter diagnosis was Hypotension, unspecified hypotension type. Diagnoses of Sepsis without acute organ dysfunction, due to unspecified organism and Leukocytosis, unspecified type were also pertinent to this visit.    Most Recent Vitals:   Vitals:    04/24/24 1245 04/24/24 1300 04/24/24 1305 04/24/24 1315   BP: 103/57 101/57 95/61 103/59   BP Location:       Patient Position:       Pulse: 70 81 79 71   Resp:       Temp:       TempSrc:       SpO2: 97% 95% 96% 96%   Weight:       Height:           Active LDAs/IV Access:   Lines, Drains & Airways       Active LDAs       Name Placement date Placement time Site Days    Peripheral IV 04/24/24 0922 Left Antecubital 04/24/24  0922  Antecubital  less than 1    Peripheral IV 04/24/24 0930 Right Antecubital 04/24/24  0930  Antecubital  less than 1    Single Lumen Implantable Port Left Subclavian --  --  Subclavian  --                    Labs (abnormal labs have a star):   Labs Reviewed   COMPREHENSIVE METABOLIC PANEL - Abnormal; Notable for the following components:       Result Value    Glucose 115 (*)     BUN 24 (*)     Creatinine 3.25 (*)     Chloride 108 (*)     CO2 16.0 (*)     Anion Gap 16.0 (*)     eGFR 19.1 (*)     All other components within normal limits    Narrative:     GFR Normal >60  Chronic Kidney Disease <60  Kidney Failure <15    The GFR formula is only valid for adults with stable renal function between ages 18 and 70.   URINALYSIS W/ MICROSCOPIC IF INDICATED (NO CULTURE) - Abnormal; Notable for the following components:    Glucose,  mg/dL (1+) (*)     Blood, UA Moderate (2+) (*)     Protein,  mg/dL (2+) (*)     Leuk Esterase, UA Small (1+) (*)     All other components within normal limits   CBC WITH AUTO DIFFERENTIAL - Abnormal; Notable for the following  "components:    WBC 39.14 (*)     RBC 3.20 (*)     Hemoglobin 9.9 (*)     Hematocrit 30.9 (*)     RDW 15.9 (*)     RDW-SD 56.2 (*)     Neutrophil % 91.2 (*)     Lymphocyte % 2.3 (*)     Eosinophil % 0.1 (*)     Immature Grans % 1.0 (*)     Neutrophils, Absolute 35.71 (*)     Monocytes, Absolute 2.01 (*)     Immature Grans, Absolute 0.38 (*)     All other components within normal limits   PROCALCITONIN - Abnormal; Notable for the following components:    Procalcitonin 0.48 (*)     All other components within normal limits    Narrative:     As a Marker for Sepsis (Non-Neonates):    1. <0.5 ng/mL represents a low risk of severe sepsis and/or septic shock.  2. >2 ng/mL represents a high risk of severe sepsis and/or septic shock.    As a Marker for Lower Respiratory Tract Infections that require antibiotic therapy:    PCT on Admission    Antibiotic Therapy       6-12 Hrs later    >0.5                Strongly Recommended  >0.25 - <0.5        Recommended   0.1 - 0.25          Discouraged              Remeasure/reassess PCT  <0.1                Strongly Discouraged     Remeasure/reassess PCT    As 28 day mortality risk marker: \"Change in Procalcitonin Result\" (>80% or <=80%) if Day 0 (or Day 1) and Day 4 values are available. Refer to http://www.[employer].mes-pct-calculator.com    Change in PCT <=80%  A decrease of PCT levels below or equal to 80% defines a positive change in PCT test result representing a higher risk for 28-day all-cause mortality of patients diagnosed with severe sepsis for septic shock.    Change in PCT >80%  A decrease of PCT levels of more than 80% defines a negative change in PCT result representing a lower risk for 28-day all-cause mortality of patients diagnosed with severe sepsis or septic shock.      TROPONIN - Abnormal; Notable for the following components:    HS Troponin T 29 (*)     All other components within normal limits    Narrative:     High Sensitive Troponin T Reference Range:  <14.0 ng/L- " Negative Female for AMI  <22.0 ng/L- Negative Male for AMI  >=14 - Abnormal Female indicating possible myocardial injury.  >=22 - Abnormal Male indicating possible myocardial injury.   Clinicians would have to utilize clinical acumen, EKG, Troponin, and serial changes to determine if it is an Acute Myocardial Infarction or myocardial injury due to an underlying chronic condition.        HIGH SENSITIVITIY TROPONIN T 2HR - Abnormal; Notable for the following components:    HS Troponin T 26 (*)     All other components within normal limits    Narrative:     High Sensitive Troponin T Reference Range:  <14.0 ng/L- Negative Female for AMI  <22.0 ng/L- Negative Male for AMI  >=14 - Abnormal Female indicating possible myocardial injury.  >=22 - Abnormal Male indicating possible myocardial injury.   Clinicians would have to utilize clinical acumen, EKG, Troponin, and serial changes to determine if it is an Acute Myocardial Infarction or myocardial injury due to an underlying chronic condition.        URINALYSIS, MICROSCOPIC ONLY - Abnormal; Notable for the following components:    RBC, UA 6-10 (*)     WBC, UA 11-20 (*)     All other components within normal limits   RESPIRATORY PANEL PCR W/ COVID-19 (SARS-COV-2), NP SWAB IN UTM/VTP, 2 HR TAT - Normal    Narrative:     In the setting of a positive respiratory panel with a viral infection PLUS a negative procalcitonin without other underlying concern for bacterial infection, consider observing off antibiotics or discontinuation of antibiotics and continue supportive care. If the respiratory panel is positive for atypical bacterial infection (Bordetella pertussis, Chlamydophila pneumoniae, or Mycoplasma pneumoniae), consider antibiotic de-escalation to target atypical bacterial infection.   LIPASE - Normal   LACTIC ACID, PLASMA - Normal   BLOOD CULTURE   BLOOD CULTURE   URINE CULTURE   RAINBOW DRAW    Narrative:     The following orders were created for panel order [place]  Draw.  Procedure                               Abnormality         Status                     ---------                               -----------         ------                     Green Top (Gel)[[id number]]                                  Final result               Lavender Top[[id number]]                                     Final result               Gold Top - SST[[id number]]                                   Final result               Gray Top[[id number]]                                         Final result               Light Blue Top[[id number]]                                   Final result                 Please view results for these tests on the individual orders.   CBC AND DIFFERENTIAL    Narrative:     The following orders were created for panel order CBC & Differential.  Procedure                               Abnormality         Status                     ---------                               -----------         ------                     CBC Auto Differential[[id number]]        Abnormal            Final result                 Please view results for these tests on the individual orders.   GREEN TOP   LAVENDER TOP   GOLD TOP - SST   GRAY TOP   LIGHT BLUE TOP       Meds Given in ED:   Medications   Sodium Chloride (PF) 0.9 % 10 mL (has no administration in time range)   ondansetron (ZOFRAN) injection 4 mg (4 mg Intravenous Given 4/24/24 0937)   sodium chloride 0.9 % bolus 1,000 mL (0 mL Intravenous Stopped 4/24/24 1048)   vancomycin IVPB 1500 mg in 0.9% NaCl (Premix) 500 mL (1,500 mg Intravenous New Bag 4/24/24 1205)   piperacillin-tazobactam (ZOSYN) 3.375 g IVPB in 100 mL NS MBP (CD) (0 g Intravenous Stopped 4/24/24 1204)   sodium chloride 0.9 % bolus 1,000 mL (0 mL Intravenous Stopped 4/24/24 1322)           Last NIH score:                                                          Dysphagia screening results:  Patient Factors Component (Dysphagia:Stroke or Rule-out)  Best Eye Response: 4-->(E4)  spontaneous (04/24/24 0924)  Best Motor Response: 6-->(M6) obeys commands (04/24/24 0924)  Best Verbal Response: 5-->(V5) oriented (04/24/24 0924)  Luis E Coma Scale Score: 15 (04/24/24 0924)     Luis E Coma Scale:  No data recorded     CIWA:        Restraint Type:            Isolation Status:  No active isolations

## 2024-04-24 NOTE — CASE MANAGEMENT/SOCIAL WORK
Discharge Planning Assessment  [facility]     Patient Name: David Barfield  MRN: [id number]  Today's Date: 4/24/2024    Admit Date: 4/24/2024    Plan: IDP   Discharge Needs Assessment       Row Name 04/24/24 1408       Living Environment    People in Home significant other    Current Living Arrangements home    Primary Care Provided by self    Quality of Family Relationships helpful;involved    Able to Return to Prior Arrangements yes       Transition Planning    Transportation Anticipated family or friend will provide       Discharge Needs Assessment    Readmission Within the Last 30 Days unable to assess    Equipment Currently Used at Home none    Concerns to be Addressed denies needs/concerns at this time                   Discharge Plan       Row Name 04/24/24 1408       Plan    Plan IDP    Plan Comments Pt is readmit from 4/22/24. Per chart, Pt lives with SO in [facility]. Pt’s PCP is Dr. Drake. Pt has Medicare and Misc commercial. Pt independent with ADLs; Pt has no DME, HH, or home O2. Pt’s SO can transport when medically ready. CM will continue to follow.    Final Discharge Disposition Code 01 - home or self-care                  Continued Care and Services - Admitted Since 4/24/2024    No active coordination exists for this encounter.          Demographic Summary       Row Name 04/24/24 1406       General Information    Arrived From home    Referral Source admission list    Reason for Consult discharge planning    Preferred Language English                   Functional Status       Row Name 04/24/24 1408       Functional Status    Usual Activity Tolerance good    Current Activity Tolerance good       Functional Status, IADL    Medications independent    Meal Preparation independent    Housekeeping independent    Laundry independent    Shopping independent                               TOLU Banuelos

## 2024-04-25 ENCOUNTER — READMISSION MANAGEMENT (OUTPATIENT)
Dept: CALL CENTER | Facility: HOSPITAL | Age: 74
End: 2024-04-25
Payer: MEDICARE

## 2024-04-25 DIAGNOSIS — C34.31 MALIGNANT NEOPLASM OF LOWER LOBE OF RIGHT LUNG: Primary | ICD-10-CM

## 2024-04-25 LAB
ALBUMIN SERPL-MCNC: 2.8 G/DL (ref 3.5–5.2)
ALBUMIN/GLOB SERPL: 0.9 G/DL
ALP SERPL-CCNC: 76 U/L (ref 39–117)
ALT SERPL W P-5'-P-CCNC: 10 U/L (ref 1–41)
ANION GAP SERPL CALCULATED.3IONS-SCNC: 12 MMOL/L (ref 5–15)
ANION GAP SERPL CALCULATED.3IONS-SCNC: 14 MMOL/L (ref 5–15)
ARTERIAL PATENCY WRIST A: ABNORMAL
AST SERPL-CCNC: 12 U/L (ref 1–40)
ATMOSPHERIC PRESS: ABNORMAL MM[HG]
BACTERIA SPEC AEROBE CULT: NO GROWTH
BASE EXCESS BLDA CALC-SCNC: -10.1 MMOL/L (ref 0–2)
BASOPHILS # BLD AUTO: 0.1 10*3/MM3 (ref 0–0.2)
BASOPHILS NFR BLD AUTO: 0.2 % (ref 0–1.5)
BDY SITE: ABNORMAL
BILIRUB SERPL-MCNC: 0.3 MG/DL (ref 0–1.2)
BODY TEMPERATURE: 37
BUN SERPL-MCNC: 22 MG/DL (ref 8–23)
BUN SERPL-MCNC: 22 MG/DL (ref 8–23)
BUN/CREAT SERPL: 6.8 (ref 7–25)
BUN/CREAT SERPL: 7.6 (ref 7–25)
C DIFF TOX GENS STL QL NAA+PROBE: NOT DETECTED
CALCIUM SPEC-SCNC: 7.5 MG/DL (ref 8.6–10.5)
CALCIUM SPEC-SCNC: 7.8 MG/DL (ref 8.6–10.5)
CHLORIDE SERPL-SCNC: 110 MMOL/L (ref 98–107)
CHLORIDE SERPL-SCNC: 110 MMOL/L (ref 98–107)
CK SERPL-CCNC: 19 U/L (ref 20–200)
CO2 BLDA-SCNC: 15 MMOL/L (ref 22–33)
CO2 SERPL-SCNC: 15 MMOL/L (ref 22–29)
CO2 SERPL-SCNC: 15 MMOL/L (ref 22–29)
COHGB MFR BLD: 1.6 % (ref 0–2)
CREAT SERPL-MCNC: 2.88 MG/DL (ref 0.76–1.27)
CREAT SERPL-MCNC: 3.22 MG/DL (ref 0.76–1.27)
DEPRECATED RDW RBC AUTO: 57.1 FL (ref 37–54)
EGFRCR SERPLBLD CKD-EPI 2021: 19.3 ML/MIN/1.73
EGFRCR SERPLBLD CKD-EPI 2021: 22.1 ML/MIN/1.73
EOSINOPHIL # BLD AUTO: 0.03 10*3/MM3 (ref 0–0.4)
EOSINOPHIL NFR BLD AUTO: 0.1 % (ref 0.3–6.2)
ERYTHROCYTE [DISTWIDTH] IN BLOOD BY AUTOMATED COUNT: 16.2 % (ref 12.3–15.4)
GLOBULIN UR ELPH-MCNC: 3 GM/DL
GLUCOSE SERPL-MCNC: 93 MG/DL (ref 65–99)
GLUCOSE SERPL-MCNC: 97 MG/DL (ref 65–99)
HCO3 BLDA-SCNC: 14.2 MMOL/L (ref 20–26)
HCT VFR BLD AUTO: 24.4 % (ref 37.5–51)
HCT VFR BLD CALC: 23.1 % (ref 38–51)
HGB BLD-MCNC: 7.8 G/DL (ref 13–17.7)
HGB BLDA-MCNC: 7.5 G/DL (ref 13.5–17.5)
IMM GRANULOCYTES # BLD AUTO: 0.77 10*3/MM3 (ref 0–0.05)
IMM GRANULOCYTES NFR BLD AUTO: 1.7 % (ref 0–0.5)
INHALED O2 CONCENTRATION: 21 %
LYMPHOCYTES # BLD AUTO: 1.22 10*3/MM3 (ref 0.7–3.1)
LYMPHOCYTES NFR BLD AUTO: 2.7 % (ref 19.6–45.3)
MAGNESIUM SERPL-MCNC: 1.1 MG/DL (ref 1.6–2.4)
MCH RBC QN AUTO: 31.1 PG (ref 26.6–33)
MCHC RBC AUTO-ENTMCNC: 32 G/DL (ref 31.5–35.7)
MCV RBC AUTO: 97.2 FL (ref 79–97)
METHGB BLD QL: 0.3 % (ref 0–1.5)
MODALITY: ABNORMAL
MONOCYTES # BLD AUTO: 1.83 10*3/MM3 (ref 0.1–0.9)
MONOCYTES NFR BLD AUTO: 4 % (ref 5–12)
MRSA DNA SPEC QL NAA+PROBE: NEGATIVE
NEUTROPHILS NFR BLD AUTO: 41.95 10*3/MM3 (ref 1.7–7)
NEUTROPHILS NFR BLD AUTO: 91.3 % (ref 42.7–76)
NRBC BLD AUTO-RTO: 0 /100 WBC (ref 0–0.2)
OXYHGB MFR BLDV: 97.1 % (ref 94–99)
PCO2 BLDA: 25.1 MM HG (ref 35–45)
PCO2 TEMP ADJ BLD: 25.1 MM HG (ref 35–48)
PH BLDA: 7.36 PH UNITS (ref 7.35–7.45)
PH, TEMP CORRECTED: 7.36 PH UNITS
PLATELET # BLD AUTO: 287 10*3/MM3 (ref 140–450)
PMV BLD AUTO: 9.6 FL (ref 6–12)
PO2 BLDA: 109 MM HG (ref 83–108)
PO2 TEMP ADJ BLD: 109 MM HG (ref 83–108)
POTASSIUM SERPL-SCNC: 3.2 MMOL/L (ref 3.5–5.2)
POTASSIUM SERPL-SCNC: 3.3 MMOL/L (ref 3.5–5.2)
PROT SERPL-MCNC: 5.8 G/DL (ref 6–8.5)
RBC # BLD AUTO: 2.51 10*6/MM3 (ref 4.14–5.8)
SODIUM SERPL-SCNC: 137 MMOL/L (ref 136–145)
SODIUM SERPL-SCNC: 139 MMOL/L (ref 136–145)
VANCOMYCIN SERPL-MCNC: 10.5 MCG/ML (ref 5–40)
VENTILATOR MODE: ABNORMAL
WBC NRBC COR # BLD AUTO: 45.9 10*3/MM3 (ref 3.4–10.8)

## 2024-04-25 PROCEDURE — 97165 OT EVAL LOW COMPLEX 30 MIN: CPT

## 2024-04-25 PROCEDURE — P9047 ALBUMIN (HUMAN), 25%, 50ML: HCPCS | Performed by: INTERNAL MEDICINE

## 2024-04-25 PROCEDURE — 25010000002 ONDANSETRON PER 1 MG: Performed by: INTERNAL MEDICINE

## 2024-04-25 PROCEDURE — 99232 SBSQ HOSP IP/OBS MODERATE 35: CPT | Performed by: INTERNAL MEDICINE

## 2024-04-25 PROCEDURE — 94799 UNLISTED PULMONARY SVC/PX: CPT

## 2024-04-25 PROCEDURE — 82375 ASSAY CARBOXYHB QUANT: CPT

## 2024-04-25 PROCEDURE — 82550 ASSAY OF CK (CPK): CPT | Performed by: INTERNAL MEDICINE

## 2024-04-25 PROCEDURE — 87641 MR-STAPH DNA AMP PROBE: CPT | Performed by: INTERNAL MEDICINE

## 2024-04-25 PROCEDURE — 94664 DEMO&/EVAL PT USE INHALER: CPT

## 2024-04-25 PROCEDURE — 83735 ASSAY OF MAGNESIUM: CPT | Performed by: INTERNAL MEDICINE

## 2024-04-25 PROCEDURE — 82805 BLOOD GASES W/O2 SATURATION: CPT

## 2024-04-25 PROCEDURE — 25010000002 PIPERACILLIN SOD-TAZOBACTAM PER 1 G: Performed by: INTERNAL MEDICINE

## 2024-04-25 PROCEDURE — 85025 COMPLETE CBC W/AUTO DIFF WBC: CPT | Performed by: INTERNAL MEDICINE

## 2024-04-25 PROCEDURE — 36600 WITHDRAWAL OF ARTERIAL BLOOD: CPT

## 2024-04-25 PROCEDURE — 87493 C DIFF AMPLIFIED PROBE: CPT | Performed by: STUDENT IN AN ORGANIZED HEALTH CARE EDUCATION/TRAINING PROGRAM

## 2024-04-25 PROCEDURE — 25010000002 DAPTOMYCIN PER 1 MG: Performed by: INTERNAL MEDICINE

## 2024-04-25 PROCEDURE — 25010000002 HEPARIN (PORCINE) PER 1000 UNITS: Performed by: INTERNAL MEDICINE

## 2024-04-25 PROCEDURE — 83050 HGB METHEMOGLOBIN QUAN: CPT

## 2024-04-25 PROCEDURE — 25010000002 CEFEPIME PER 500 MG: Performed by: INTERNAL MEDICINE

## 2024-04-25 PROCEDURE — 25010000002 POTASSIUM CHLORIDE PER 2 MEQ OF POTASSIUM: Performed by: INTERNAL MEDICINE

## 2024-04-25 PROCEDURE — 99223 1ST HOSP IP/OBS HIGH 75: CPT | Performed by: INTERNAL MEDICINE

## 2024-04-25 PROCEDURE — 80053 COMPREHEN METABOLIC PANEL: CPT | Performed by: INTERNAL MEDICINE

## 2024-04-25 PROCEDURE — 25810000003 SODIUM CHLORIDE 0.9 % SOLUTION: Performed by: INTERNAL MEDICINE

## 2024-04-25 PROCEDURE — 87507 IADNA-DNA/RNA PROBE TQ 12-25: CPT | Performed by: INTERNAL MEDICINE

## 2024-04-25 PROCEDURE — 25810000003 LACTATED RINGERS PER 1000 ML: Performed by: INTERNAL MEDICINE

## 2024-04-25 PROCEDURE — 97161 PT EVAL LOW COMPLEX 20 MIN: CPT

## 2024-04-25 PROCEDURE — 25010000002 ALBUMIN HUMAN 25% PER 50 ML: Performed by: INTERNAL MEDICINE

## 2024-04-25 PROCEDURE — 80202 ASSAY OF VANCOMYCIN: CPT

## 2024-04-25 RX ORDER — SODIUM CHLORIDE 9 MG/ML
100 INJECTION, SOLUTION INTRAVENOUS CONTINUOUS
Status: DISCONTINUED | OUTPATIENT
Start: 2024-04-25 | End: 2024-04-25

## 2024-04-25 RX ORDER — SODIUM BICARBONATE 650 MG/1
650 TABLET ORAL 2 TIMES DAILY
Status: DISCONTINUED | OUTPATIENT
Start: 2024-04-25 | End: 2024-04-26

## 2024-04-25 RX ORDER — MIDODRINE HYDROCHLORIDE 10 MG/1
10 TABLET ORAL
Status: DISCONTINUED | OUTPATIENT
Start: 2024-04-25 | End: 2024-04-26

## 2024-04-25 RX ORDER — ALBUMIN (HUMAN) 12.5 G/50ML
25 SOLUTION INTRAVENOUS 2 TIMES DAILY
Status: COMPLETED | OUTPATIENT
Start: 2024-04-25 | End: 2024-04-25

## 2024-04-25 RX ADMIN — PRAVASTATIN SODIUM 80 MG: 40 TABLET ORAL at 20:06

## 2024-04-25 RX ADMIN — MIDODRINE HYDROCHLORIDE 10 MG: 10 TABLET ORAL at 12:37

## 2024-04-25 RX ADMIN — TIOTROPIUM BROMIDE INHALATION SPRAY 2 PUFF: 3.12 SPRAY, METERED RESPIRATORY (INHALATION) at 08:58

## 2024-04-25 RX ADMIN — Medication 10 ML: at 20:01

## 2024-04-25 RX ADMIN — BUDESONIDE AND FORMOTEROL FUMARATE DIHYDRATE 2 PUFF: 160; 4.5 AEROSOL RESPIRATORY (INHALATION) at 21:24

## 2024-04-25 RX ADMIN — FERROUS SULFATE TAB 325 MG (65 MG ELEMENTAL FE) 325 MG: 325 (65 FE) TAB at 08:16

## 2024-04-25 RX ADMIN — PANTOPRAZOLE SODIUM 40 MG: 40 TABLET, DELAYED RELEASE ORAL at 04:44

## 2024-04-25 RX ADMIN — SODIUM CHLORIDE 100 ML/HR: 9 INJECTION, SOLUTION INTRAVENOUS at 08:17

## 2024-04-25 RX ADMIN — SODIUM CHLORIDE 500 ML: 9 INJECTION, SOLUTION INTRAVENOUS at 12:37

## 2024-04-25 RX ADMIN — HEPARIN SODIUM 5000 UNITS: 5000 INJECTION INTRAVENOUS; SUBCUTANEOUS at 20:06

## 2024-04-25 RX ADMIN — ALBUMIN (HUMAN) 25 G: 0.25 INJECTION, SOLUTION INTRAVENOUS at 12:37

## 2024-04-25 RX ADMIN — BUDESONIDE AND FORMOTEROL FUMARATE DIHYDRATE 2 PUFF: 160; 4.5 AEROSOL RESPIRATORY (INHALATION) at 08:58

## 2024-04-25 RX ADMIN — CEFEPIME 2000 MG: 2 INJECTION, POWDER, FOR SOLUTION INTRAVENOUS at 08:36

## 2024-04-25 RX ADMIN — MIDODRINE HYDROCHLORIDE 10 MG: 10 TABLET ORAL at 17:42

## 2024-04-25 RX ADMIN — FLUTICASONE PROPIONATE 2 SPRAY: 50 SPRAY, METERED NASAL at 08:17

## 2024-04-25 RX ADMIN — POTASSIUM CHLORIDE: 2 INJECTION, SOLUTION, CONCENTRATE INTRAVENOUS at 13:20

## 2024-04-25 RX ADMIN — ONDANSETRON 4 MG: 2 INJECTION INTRAMUSCULAR; INTRAVENOUS at 04:40

## 2024-04-25 RX ADMIN — Medication 10 ML: at 08:18

## 2024-04-25 RX ADMIN — PIPERACILLIN SODIUM AND TAZOBACTAM SODIUM 3.38 G: 3; .375 INJECTION, POWDER, LYOPHILIZED, FOR SOLUTION INTRAVENOUS at 02:26

## 2024-04-25 RX ADMIN — CEFEPIME 2000 MG: 2 INJECTION, POWDER, FOR SOLUTION INTRAVENOUS at 20:00

## 2024-04-25 RX ADMIN — ALBUMIN (HUMAN) 25 G: 0.25 INJECTION, SOLUTION INTRAVENOUS at 20:47

## 2024-04-25 RX ADMIN — HEPARIN SODIUM 5000 UNITS: 5000 INJECTION INTRAVENOUS; SUBCUTANEOUS at 08:16

## 2024-04-25 RX ADMIN — SODIUM BICARBONATE 650 MG TABLET 650 MG: at 20:06

## 2024-04-25 RX ADMIN — DAPTOMYCIN 650 MG: 500 INJECTION, POWDER, LYOPHILIZED, FOR SOLUTION INTRAVENOUS at 10:14

## 2024-04-25 RX ADMIN — SODIUM BICARBONATE 650 MG TABLET 650 MG: at 12:37

## 2024-04-25 NOTE — PLAN OF CARE
Goal Outcome Evaluation:  Plan of Care Reviewed With: patient           Outcome Evaluation: Pt demo ind with grooming,  ind LBD, supervision to ambulate in room without AD.  Pt with drop in BP with activity 83/58 - asymptomatic.   Pt does not demo any deficits which would require OT intervention at this time.  Recommend home with assist upon d/c.      Anticipated Discharge Disposition (OT): home with assist

## 2024-04-25 NOTE — PROGRESS NOTES
Malnutrition Severity Assessment    Patient Name:  David Barfield  YOB: 1950  MRN: [id number]  Admit Date:  4/24/2024    Patient meets criteria for : Severe Malnutrition    Comments:  Pt meets criteria for chronic, severe malnutrition with the indicators of <75% of EEN for >1 month, significant weight loss of 9.8% x3 months, moderate muscle wasting and severe subcutaneous fat loss. Of note, pt initially met criteria on 4/14/24 admission and continues to meet. Suspect lung cancer primary cause of inadequate intake and overall malnutrition.     Malnutrition Severity Assessment  Malnutrition Type: Chronic Disease - Related Malnutrition  Malnutrition Type (Last 8 Hours)       Malnutrition Severity Assessment       Row Name 04/25/24 1437       Malnutrition Severity Assessment    Malnutrition Type Chronic Disease - Related Malnutrition      Row Name 04/25/24 1437       Insufficient Energy Intake     Insufficient Energy Intake Findings Severe    Insufficient Energy Intake  <75% of est. energy requirement for > or equal to 1 month      Row Name 04/25/24 1437       Unintentional Weight Loss     Unintentional Weight Loss Findings Severe    Unintentional Weight Loss  Weight loss greater than 7.5% in three months  9.8% x3 months      Row Name 04/25/24 1437       Muscle Loss    Loss of Muscle Mass Findings Moderate    [place] Region Moderate - slight depression    Clavicle Bone Region Moderate - some protrusion in females, visible in males    Acromion Bone Region Moderate - acromion may slightly protrude    Scapular Bone Region Moderate - mild depression, bones may show slightly    Dorsal Hand Region Moderate - slight depression    Patellar Region Moderate - patella more prominent, less muscle definition around patella    Posterior Calf Region Moderate - some roundness, slight firmness      Row Name 04/25/24 1437       Fat Loss    Subcutaneous Fat Loss Findings Severe    Orbital Region  Severe - pronounced  hollowness/depression, dark circles, loose saggy skin    Upper Arm Region Severe - mostly skin, very little space between folds, fingers touch      Row Name 04/25/24 6137       Criteria Met (Must meet criteria for severity in at least 2 of these categories: M Wasting, Fat Loss, Fluid, Secondary Signs, Wt. Status, Intake)    Patient meets criteria for  Severe Malnutrition                    Electronically signed by:  Jaja Contreras MS,RD,LD  04/25/24 14:47 EDT

## 2024-04-25 NOTE — PLAN OF CARE
Goal Outcome Evaluation:  Plan of Care Reviewed With: patient        Progress: no change  Outcome Evaluation: Pt presents with decreased functional mobility and decreased endurance with activity. Pt ambulated 40ft in room with CGA, unsupported. Recommend continued skilled IP PT interventions. Recommend D/C home with assist and HHPT.      Anticipated Discharge Disposition (PT): home with assist, home with home health

## 2024-04-25 NOTE — PLAN OF CARE
Problem: Adult Inpatient Plan of Care  Goal: Plan of Care Review  Outcome: Ongoing, Progressing  Flowsheets (Taken 4/25/2024 1517)  Progress: improving  Plan of Care Reviewed With: patient  Goal: Patient-Specific Goal (Individualized)  Outcome: Ongoing, Progressing  Goal: Absence of Hospital-Acquired Illness or Injury  Outcome: Ongoing, Progressing  Intervention: Identify and Manage Fall Risk  Recent Flowsheet Documentation  Taken 4/25/2024 0815 by Raulito Rubin RN  Safety Promotion/Fall Prevention:   activity supervised   nonskid shoes/slippers when out of bed   room organization consistent   safety round/check completed  Intervention: Prevent Skin Injury  Recent Flowsheet Documentation  Taken 4/25/2024 0815 by Raulito Rubin RN  Body Position:   position changed independently   weight shifting  Skin Protection:   adhesive use limited   tubing/devices free from skin contact  Intervention: Prevent and Manage VTE (Venous Thromboembolism) Risk  Recent Flowsheet Documentation  Taken 4/25/2024 0815 by Raulito Rubin RN  Activity Management:   activity encouraged   ambulated in room  VTE Prevention/Management: (patient is on Heparin Sub Q)   bilateral   sequential compression devices on  Intervention: Prevent Infection  Recent Flowsheet Documentation  Taken 4/25/2024 0815 by Raulito Rubin RN  Infection Prevention:   environmental surveillance performed   equipment surfaces disinfected   hand hygiene promoted   personal protective equipment utilized   rest/sleep promoted   single patient room provided  Goal: Optimal Comfort and Wellbeing  Outcome: Ongoing, Progressing  Intervention: Provide Person-Centered Care  Recent Flowsheet Documentation  Taken 4/25/2024 0815 by Raulito Rubin RN  Trust Relationship/Rapport:   care explained   choices provided   emotional support provided   empathic listening provided   questions answered   questions encouraged   reassurance provided   thoughts/feelings acknowledged  Goal:  Readiness for Transition of Care  Outcome: Ongoing, Progressing     Problem: Pain Acute  Goal: Acceptable Pain Control and Functional Ability  Outcome: Ongoing, Progressing  Intervention: Prevent or Manage Pain  Recent Flowsheet Documentation  Taken 4/25/2024 0815 by Raulito Rubin RN  Sensory Stimulation Regulation:   auditory stimulation minimized   care clustered   lighting decreased   quiet environment promoted   tactile stimulation minimized   visual stimulation minimized  Sleep/Rest Enhancement:   awakenings minimized   consistent schedule promoted   family presence promoted   natural light exposure provided   regular sleep/rest pattern promoted   relaxation techniques promoted  Medication Review/Management: medications reviewed  Intervention: Optimize Psychosocial Wellbeing  Recent Flowsheet Documentation  Taken 4/25/2024 0815 by Raulito Rubin RN  Supportive Measures:   active listening utilized   positive reinforcement provided   verbalization of feelings encouraged  Diversional Activities: television  Spiritual Activities Assistance:   affirmation provided   personal rituals encouraged     Problem: Infection  Goal: Absence of Infection Signs and Symptoms  Outcome: Ongoing, Progressing     Problem: Fall Injury Risk  Goal: Absence of Fall and Fall-Related Injury  Outcome: Ongoing, Progressing  Intervention: Identify and Manage Contributors  Recent Flowsheet Documentation  Taken 4/25/2024 0815 by Raulito Rubin RN  Medication Review/Management: medications reviewed  Self-Care Promotion:   BADL personal objects within reach   BADL personal routines maintained   safe use of adaptive equipment encouraged  Intervention: Promote Injury-Free Environment  Recent Flowsheet Documentation  Taken 4/25/2024 0815 by Raulito Rubin RN  Safety Promotion/Fall Prevention:   activity supervised   nonskid shoes/slippers when out of bed   room organization consistent   safety round/check completed   Goal Outcome  Evaluation:  Plan of Care Reviewed With: patient        Progress: improving

## 2024-04-25 NOTE — PROGRESS NOTES
[facility] Medicine Services  PROGRESS NOTE    Patient Name: David Barfield  : 1950  MRN: [id number]    Date of Admission: 2024  Primary Care Physician: Shahid Drake MD    Subjective   Subjective     CC:  N/V, weakness    HPI:  Resting in bed no acute distress and tells me that he feels better.  Patient's wife is also present at the bedside.  Denies any fever or chills.  No chest pain or palpitation or shortness of breath at rest.  No nausea or vomiting currently.      Objective   Objective     Vital Signs:   Temp:  [97.7 °F (36.5 °C)-99.4 °F (37.4 °C)] 99.4 °F (37.4 °C)  Heart Rate:  [70-92] 72  Resp:  [16-20] 18  BP: ()/(46-70) 92/50     Physical Exam:  Constitutional: No acute distress, awake, alert  HENT: NCAT, mucous membranes moist  Respiratory: Clear to auscultation bilaterally, respiratory effort normal   Cardiovascular: RRR, no murmurs, rubs, or gallops  Gastrointestinal: Positive bowel sounds, soft, nontender, nondistended  Musculoskeletal: No bilateral ankle edema  Psychiatric: Appropriate affect, cooperative  Neurologic: Oriented x 3, strength symmetric in all extremities, Cranial Nerves grossly intact to confrontation, speech clear  Skin: No rashes     Results Reviewed:  LAB RESULTS:      Lab 24  0727 24  0922 24  0528 24  0323   WBC 45.90* 39.14* 10.87* 10.31   HEMOGLOBIN 7.8* 9.9* 8.0* 7.6*   HEMATOCRIT 24.4* 30.9* 24.8* 23.4*   PLATELETS 287 384 280 219   NEUTROS ABS 41.95* 35.71* 7.69* 7.36*   IMMATURE GRANS (ABS) 0.77* 0.38* 0.12* 0.13*   LYMPHS ABS 1.22 0.89 1.33 1.35   MONOS ABS 1.83* 2.01* 1.41* 1.23*   EOS ABS 0.03 0.04 0.28 0.21   MCV 97.2* 96.6 94.3 93.2   PROCALCITONIN  --  0.48*  --   --    LACTATE  --  1.6  --   --          Lab 24  0727 24  0922 24  0715 24  0403 24  0528 24  0323   SODIUM 139 140 142 141 140 139   POTASSIUM 3.2* 3.8 4.0 4.0 4.1 4.2   CHLORIDE 110* 108* 108* 109*  107 110*   CO2 15.0* 16.0* 19.0* 17.0* 19.0* 17.0*   ANION GAP 14.0 16.0* 15.0 15.0 14.0 12.0   BUN 22 24* 26* 32* 38* 45*   CREATININE 3.22* 3.25* 4.19* 5.22* 6.41* 7.29*   EGFR 19.3* 19.1* 14.1* 10.8* 8.4* 7.2*   GLUCOSE 93 115* 101* 93 97 103*   CALCIUM 7.5* 9.0 8.7 8.5* 8.3* 8.0*   MAGNESIUM 1.1*  --   --   --   --   --    PHOSPHORUS  --   --  4.5 4.8* 5.2* 4.4         Lab 04/25/24  0727 04/24/24  0922 04/22/24  0715 04/21/24  0403 04/20/24  0528   TOTAL PROTEIN 5.8* 8.5  --   --   --    ALBUMIN 2.8* 3.5 3.3* 2.9* 2.9*   GLOBULIN 3.0 5.0  --   --   --    ALT (SGPT) 10 18  --   --   --    AST (SGOT) 12 25  --   --   --    BILIRUBIN 0.3 0.3  --   --   --    ALK PHOS 76 90  --   --   --    LIPASE  --  23  --   --   --          Lab 04/24/24  1125 04/24/24  0922   HSTROP T 26* 29*                 Lab 04/25/24  0908   PH, ARTERIAL 7.362   PCO2, ARTERIAL 25.1*   PO2 .0*   FIO2 21   HCO3 ART 14.2*   BASE EXCESS ART -10.1*   CARBOXYHEMOGLOBIN 1.6     Brief Urine Lab Results  (Last result in the past 365 days)        Color   Clarity   Blood   Leuk Est   Nitrite   Protein   CREAT   Urine HCG        04/24/24 1016 Yellow   Clear   Moderate (2+)   Small (1+)   Negative   100 mg/dL (2+)                   Microbiology Results Abnormal       Procedure Component Value - Date/Time    Blood Culture - Blood, Arm, Right [[id number]]  (Normal) Collected: 04/24/24 1049    Lab Status: Preliminary result Specimen: Blood from Arm, Right Updated: 04/25/24 1130     Blood Culture No growth at 24 hours    Narrative:      Less than seven (7) mL's of blood was collected.  Insufficient quantity may yield false negative results.    Blood Culture - Blood, Arm, Left [[id number]]  (Normal) Collected: 04/24/24 1100    Lab Status: Preliminary result Specimen: Blood from Arm, Left Updated: 04/25/24 1130     Blood Culture No growth at 24 hours    Narrative:      Less than seven (7) mL's of blood was collected.  Insufficient quantity may yield false  negative results.    Urine Culture - Urine, Urine, Clean Catch [[id number]]  (Normal) Collected: 04/24/24 1016    Lab Status: Final result Specimen: Urine, Clean Catch Updated: 04/25/24 1023     Urine Culture No growth    Respiratory Panel PCR w/COVID-19(SARS-CoV-2) [facility]NA/[facility] In-House, NP Swab in UTM/VTM, 2 HR TAT - Swab, Nasopharynx [[id number]]  (Normal) Collected: 04/24/24 1016    Lab Status: Final result Specimen: Swab from Nasopharynx Updated: 04/24/24 1115     ADENOVIRUS, PCR Not Detected     Coronavirus 229E Not Detected     Coronavirus HKU1 Not Detected     Coronavirus NL63 Not Detected     Coronavirus OC43 Not Detected     COVID19 Not Detected     Human Metapneumovirus Not Detected     Human Rhinovirus/Enterovirus Not Detected     Influenza A PCR Not Detected     Influenza B PCR Not Detected     Parainfluenza Virus 1 Not Detected     Parainfluenza Virus 2 Not Detected     Parainfluenza Virus 3 Not Detected     Parainfluenza Virus 4 Not Detected     RSV, PCR Not Detected     Bordetella pertussis pcr Not Detected     Bordetella parapertussis PCR Not Detected     Chlamydophila pneumoniae PCR Not Detected     Mycoplasma pneumo by PCR Not Detected    Narrative:      In the setting of a positive respiratory panel with a viral infection PLUS a negative procalcitonin without other underlying concern for bacterial infection, consider observing off antibiotics or discontinuation of antibiotics and continue supportive care. If the respiratory panel is positive for atypical bacterial infection (Bordetella pertussis, Chlamydophila pneumoniae, or Mycoplasma pneumoniae), consider antibiotic de-escalation to target atypical bacterial infection.            CT Abdomen Pelvis Without Contrast    Result Date: 4/24/2024  CT ABDOMEN PELVIS WO CONTRAST Date of Exam: 4/24/2024 12:13 PM EDT Indication: Sepsis, source unclear. Comparison: 4/12/2024 Technique: Axial CT images were obtained of the abdomen and pelvis  without the administration of contrast. Reconstructed coronal and sagittal images were also obtained. Automated exposure control and iterative construction methods were used. Findings: Included lung bases are unchanged in appearance from prior CT. No new abnormality. No suspicious hepatic lesion. Gallbladder is unremarkable. No significant biliary ductal dilation. The spleen, pancreas, and bilateral adrenal glands are unchanged. There is significantly decreased perinephric fat stranding about the bilateral kidneys. There is no evidence of hydronephrosis or nephrolithiasis. No suspicious renal lesion. Small hiatal hernia. No bowel obstruction. Normal appendix. A few scattered colonic diverticuli without evidence of diverticulitis. No suspicious lymphadenopathy. Dense atherosclerotic calcifications of the aorta and branch vessels. Decreased urinary bladder wall thickening which may be in part secondary to increased distention. Prostatomegaly. Abdominal and pelvic wall soft tissues show no acute abnormality. There is no acute fracture or suspicious osseous lesion.     Impression: Impression: No new discrete abnormality of the abdomen or pelvis as compared to prior CT. Significantly decreased perinephric fat stranding about the bilateral kidneys. Decreased urinary bladder wall thickening which may be in part secondary to increased distention compared to prior exam. Electronically Signed: Raulito Lgiht MD  4/24/2024 12:47 PM EDT  Workstation ID: [id number]    CT Chest Without Contrast Diagnostic    Result Date: 4/24/2024  CT CHEST WO CONTRAST DIAGNOSTIC Date of Exam: 4/24/2024 12:13 PM EDT Indication: Cough, sepsis, unclear source. Comparison: CT chest 4/12/2024 Technique: Axial CT images were obtained of the chest without contrast administration.  Reconstructed coronal and sagittal images were also obtained. Automated exposure control and iterative construction methods were used. Findings: MEDIASTINUM: The heart size is  stable. Trace pericardial fluid. Right chest wall AICD. Left chest wall infusion port with tip in the SVC. CORONARY ARTERIES: There is calcified atherosclerotic disease. LUNGS: There are postsurgical changes of the right lower lung with inferomedial scarring posteriorly. There is a loculated right pleural effusion tracking into the fissure, similar to the previous study. There is diffuse emphysema with biapical scarring. LYMPH NODES: Mildly prominent mediastinal lymph nodes are unchanged. There are small calcified subcarinal and left hilar nodes. OSSEOUS STRUCTURES: Multilevel thoracic degenerative disc disease. Stable healing right-sided rib fractures. No acute osseous abnormality.     Impression: Impression: 1. Stable postsurgical changes of the right lower lung with scarring and chronic appearing loculated right pleural effusion. 2. Mild emphysema and biapical scarring. Electronically Signed: Aimee Freitas MD  4/24/2024 12:37 PM EDT  Workstation ID: [id number]    XR Chest 1 View    Result Date: 4/24/2024  XR CHEST 1 VW Date of Exam: 4/24/2024 10:15 AM EDT Indication: Sepsis, cough Comparison: 4/12/2024 Findings: Cardiomediastinal silhouette is stable. There are postsurgical changes of the right lung with a small chronic effusion. Left lung is clear. Left-sided port catheter with the tip in the SVC. Right AICD. No acute osseous abnormality identified.     Impression: Impression: Stable chronic appearance of the chest. No acute cardiopulmonary abnormality. Electronically Signed: Aimee Freitas MD  4/24/2024 10:37 AM EDT  Workstation ID: [id number]     Results for orders placed during the hospital encounter of 07/09/21    Adult Transthoracic Echo Complete W/ Cont if Necessary Per Protocol    Interpretation Summary  · Estimated left ventricular EF = 55% Left ventricular systolic function is normal.  · Left ventricular diastolic function was normal.  · Left ventricular wall thickness is consistent with mild concentric  hypertrophy.  · Trace mitral and tricuspid regurgitation.      Current medications:  Scheduled Meds:albumin human, 25 g, Intravenous, BID  [Held by provider] amLODIPine, 5 mg, Oral, Q24H  budesonide-formoterol, 2 puff, Inhalation, BID - RT   And  tiotropium bromide monohydrate, 2 puff, Inhalation, Daily - RT  cefepime, 2,000 mg, Intravenous, Q24H  DAPTOmycin, 8 mg/kg (Adjusted), Intravenous, Q48H  ferrous sulfate, 325 mg, Oral, Daily With Breakfast  fluticasone, 2 spray, Each Nare, Daily  heparin (porcine), 5,000 Units, Subcutaneous, Q12H  midodrine, 10 mg, Oral, TID AC  pantoprazole, 40 mg, Oral, Q AM  pravastatin, 80 mg, Oral, Nightly  sodium bicarbonate, 650 mg, Oral, BID  sodium chloride, 10 mL, Intravenous, Q12H      Continuous Infusions:lactated ringers 990 mL with potassium chloride 20 mEq infusion, , Last Rate: 75 mL/hr at 04/25/24 1320      PRN Meds:.  acetaminophen **OR** acetaminophen **OR** acetaminophen    calcium carbonate    HYDROcodone-acetaminophen    nitroglycerin    ondansetron ODT **OR** ondansetron    Sodium Chloride (PF)    sodium chloride    sodium chloride    Assessment & Plan   Assessment & Plan     Active Hospital Problems    Diagnosis  POA    **Sepsis [A41.9]  Yes    Leukocytosis [D72.829]  Unknown    Severe malnutrition [E43]  Yes    ARACELI (acute kidney injury) [N17.9]  Yes    Bronchogenic cancer of right lung [C34.91]  Yes    Malignant neoplasm of lower lobe of right lung [C34.31]  Yes    Tobacco abuse [Z72.0]  Yes    Mixed hyperlipidemia [E78.2]  Yes    Presence of cardiac pacemaker [Z95.0]  Yes      Resolved Hospital Problems   No resolved problems to display.        Brief Hospital Course to date:  David Barfield is a 74 y.o. male  with history of non-small cell lung ca s/p neoadjuvant chemoimmunotherapy and RLL lobectomy currently on Opdivo (last 4/4/24), HTN, emphysema, presence of port and pacemaker, current tobacco use, recent admission (4/12-4/22)  for sepsis related to  pyelonephritis who presented back with vague abd pain, nausea/vomiting and weakness. Initial labs with WBC 39, procal 0.48, Cr 3.25. CT chest with no acute findings. CT abd/pel with no new findings, improved perinephric fat stranding about the bilateral kidneys.      Leukocytosis  Sepsis with no known source of infection  - Last admission, Ucx and Bcx negative  - Completed 10 days of cefepime -- WBC up to 46 but normalized during admission. WBC 10.87 on 4/20 and 39 on admission   - Bcx pending, Ucx pending; resp PCR negative   - zosyn/vanc, will continue Zosyn for now although there is no strong evidence of infectious process.  - IVF   -Patient denies fever.  Patient's wife was present at the bedside reports that they have measured temperature up to 90s.  She thinks that this is fever however patient denies feeling febrile and he is temperature has never gone above 100.  -There is a possibility that this is secondary to a noninfectious process.  At this time, however, will continue antibiotics.  - Will ask hematology oncology to see the patient also.     ARACELI on CKD 3  Metabolic acidosis  - ARACELI on last admission. Neph followed at that time  - Baseline Cr 0.9-1.1. Peaked to 8.45 last admission  - Cr continues to improve.   - IVF  -Also will add sodium bicarbonate     HTN   -Blood pressure is on the low side.  Will continue with IV fluid resuscitation and also add medial drawing and monitor.     Non-small cell lung cancer  - Follows with Dr. Ashley. Last Opdivo treatment 4/4   -Will ask oncology to see the patient.     Anemia  - Improved. Follow up outpatient with hematology   -Transfuse if necessary.      Expected Discharge Location and Transportation: Home  Expected Discharge to be determined  Expected discharge date/ time has not been documented.     DVT prophylaxis:  Medical DVT prophylaxis orders are present.         AM-PAC 6 Clicks Score (PT): 22 (04/25/24 4583)    CODE STATUS:   Code Status and Medical  Interventions:   Ordered at: 04/24/24 1519     Code Status (Patient has no pulse and is not breathing):    CPR (Attempt to Resuscitate)     Medical Interventions (Patient has pulse or is breathing):    Full Support       Bharath Jimenez MD  04/25/24

## 2024-04-25 NOTE — PROGRESS NOTES
Clinical Nutrition   Nutrition Assessment  Reason for Visit: MST score 2+, Unintentional weight loss, Reduced oral intake    Patient Name: David Barfield  YOB: 1950  MRN: [id number]  Date of Encounter: 04/25/24 14:36 EDT  Admission date: 4/24/2024    Comments:    Encouraged PO intake on current diet as tolerated  Provide Magic Cup (chocolate) 2x daily  Declined alternative ONS  Preference obtained and communicated to kitchen  Patient meets malnutrition criteria, see MSA for full assessment.  Met criteria on 4/14, continues to meet      Nutrition Assessment   Admission Diagnosis:  Sepsis [A41.9]    Problem List:    Sepsis    Presence of cardiac pacemaker    Mixed hyperlipidemia    Tobacco abuse    Bronchogenic cancer of right lung    Malignant neoplasm of lower lobe of right lung    ARACELI (acute kidney injury)    Severe malnutrition    Leukocytosis      PMH:   He  has a past medical history of Abnormal ECG, Arrhythmia (2019), Asthma (2019), Bronchogenic cancer of right lung (10/04/2023), Diabetes mellitus (Borderline), Emphysema/COPD, Erectile disorder, GERD (gastroesophageal reflux disease), History of chemotherapy, Hyperlipidemia, Hypertension (2019), Lung nodule, Mumps, Mumps, Pruritus, Slow to wake up after anesthesia, Wears dentures, and Wears hearing aid in both ears.    PSH:  He  has a past surgical history that includes Bone biopsy; Facial fracture surgery; Cardiac electrophysiology procedure (N/A, 08/17/2021); BRONCHOSCOPY WITH ION ROBOTIC ASSIST (N/A, 09/15/2023); Pacemaker Implantation; and bronchoscopy thoracotomy (Right, 1/9/2024).    Applicable Nutrition Concerns:   Skin:  Oral:  GI:    Applicable Interval History:       Reported/Observed/Food/Nutrition Related History:     Pt up in bed with daughter present at time of visit. Endorsed anorexia since initiation of immunotherapy. Had been drinking ONS for ~1 month but has been unable to tolerate or have desire to drink  "recently. Pt with recent hospitalization ~2wks ago 2/2 pyelonephritis - daughter reports noticeable difference to face with loss of fluid weight. Denies dysphagia however now stating avoiding bread due to dryness in mouth. Emesis x1 in past 24hrs - + diarrhea, c.diff r/o isolation. NKFA    Anthropometrics     Height: Height: 182.9 cm (72\")  Last Filed Weight: Weight: 79.6 kg (175 lb 8 oz) (04/24/24 1423)  Method: Weight Method: Bed scale  BMI: BMI (Calculated): 23.8    UBW:     Weight      Weight (kg) Weight (lbs) Weight Method   11/14/2023 87.998 kg  194 lb     11/21/2023 88.451 kg  195 lb     12/4/2023 87.544 kg  193 lb     12/5/2023 88.905 kg  196 lb     12/12/2023 88.451 kg  195 lb     12/28/2023 88.905 kg  196 lb     12/29/2023 89.9 kg  198 lb 3.1 oz  Standing scale    1/8/2024 88.814 kg  195 lb 12.8 oz     1/9/2024 88.8 kg  195 lb 12.3 oz     1/11/2024 90.583 kg  199 lb 11.2 oz  Bed scale    1/12/2024 89.359 kg  197 lb  Bed scale    1/18/2024 88.996 kg  196 lb 3.2 oz     1/22/2024 89.449 kg  197 lb 3.2 oz     1/23/2024 87.998 kg  194 lb     1/30/2024 87.091 kg  192 lb     2/2/2024 86.002 kg  189 lb 9.6 oz     2/6/2024 85.821 kg  189 lb 3.2 oz     2/8/2024 85.73 kg  189 lb     2/10/2024 85.73 kg  189 lb  Stated    2/15/2024 85.73 kg  189 lb     2/21/2024 82.509 kg  181 lb 14.4 oz     2/27/2024 82.555 kg  182 lb     3/20/2024 81.647 kg  180 lb     3/27/2024 82.101 kg  181 lb     4/4/2024 80.74 kg  178 lb     4/10/2024 79.833 kg  176 lb     4/12/2024 78.019 kg  172 lb  Stated    4/18/2024 85.639 kg  188 lb 12.8 oz  Bed scale    4/24/2024 79.606 kg  175 lb 8 oz  Bed scale     73.483 kg  162 lb       Weight change: weight loss of 19 lbs (9.8%) over the past 3 month(s)    Intentional?  No    Nutrition Focused Physical Exam     Date:  4/25       Patient meets criteria for malnutrition diagnosis, see MSA note.     Current Nutrition Prescription   PO: Diet: Cardiac; Healthy Heart (2-3 Na+); Fluid Consistency: Thin " (IDDSI 0)  Oral Nutrition Supplement: N/A  Intake:  bites of lunch per daughter    Nutrition Diagnosis   Date:  4/25            Updated:    Problem Malnutrition  chronic, severe   Etiology Anorexia 2/2 non-small cell lung cancer   Signs/Symptoms <75% of EEN for >1 month, significant weight loss of 9.8% x3 months, moderate muscle wasting and severe subcutaneous fat loss.   Status: Ongoing    Date:  4/25    Updated:     Problem Inadequate oral intake   Etiology anorexia   Signs/Symptoms Reported PO hx   Status: New    Goal:   Nutrition to support treatment and Tolerate PO, Increase intake      Nutrition Intervention      Follow treatment progress, Care plan reviewed, Advise alternate selection, Interview for preferences, Encourage intake, Supplement provided      Monitoring/Evaluation:   Per protocol, I&O, PO intake, Pertinent labs, GI status, Symptoms, POC/GOC      Jaja Contreras MS,RD,LD  Time Spent: 25min

## 2024-04-25 NOTE — THERAPY EVALUATION
Patient Name: David Barfield  : 1950    MRN: [id number]                              Today's Date: 2024       Admit Date: 2024    Visit Dx:     ICD-10-CM ICD-9-CM   1. Hypotension, unspecified hypotension type  I95.9 458.9   2. Sepsis without acute organ dysfunction, due to unspecified organism  A41.9 038.9     995.91   3. Leukocytosis, unspecified type  D72.829 288.60     Patient Active Problem List   Diagnosis    High degree atrioventricular block    Bradycardia, sinus    Chronic hypotension    PVC's (premature ventricular contractions)    Presence of cardiac pacemaker    Mixed hyperlipidemia    Centrilobular emphysema    Nodule of lower lobe of right lung    Mediastinal adenopathy    Cough    Tobacco abuse    Bronchogenic cancer of right lung    Malignant neoplasm of lower lobe of right lung    Primary hypertension    GERD without esophagitis    Sepsis    Acute pyelonephritis    ARACELI (acute kidney injury)    Hypokalemia    Severe malnutrition    Leukocytosis     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    Diabetes mellitus Borderline    Emphysema/COPD     Erectile disorder     GERD (gastroesophageal reflux disease)     History of chemotherapy     Hyperlipidemia     Hypertension 2019    Lung nodule     Mumps     Mumps     Pruritus     after bath    Slow to wake up after anesthesia     Wears dentures     upper only    Wears hearing aid in both ears     usually only wears right     Past Surgical History:   Procedure Laterality Date    BONE BIOPSY      broken bone surgery in his face    BRONCHOSCOPY THORACOTOMY Right 2024    Procedure: THORACOTOMY FOR LOWER LOBECTOMY AND MEDISTINAL LYMPH NODE DISSECTION RIGHT;  Surgeon: Joey Patel MD;  Location: [facility];  Service: Cardiothoracic;  Laterality: Right;    BRONCHOSCOPY WITH ION ROBOTIC ASSIST N/A 09/15/2023    Procedure:  BRONCHOSCOPY NAVIGATION WITH ENDOBRONCHIAL ULTRASOUND AND ION ROBOT;  Surgeon: Octaviano Sampson MD;  Location:  [facility];  Service: Robotics - Pulmonary;  Laterality: N/A;  ion #6 - 0032  - 0015  Cath guide 0061    EBUS balloon removed and intact    CARDIAC ELECTROPHYSIOLOGY PROCEDURE N/A 08/17/2021    Procedure: Pacemaker DC new;  Surgeon: Kayy Box MD;  Location:  [facility];  Service: Cardiology;  Laterality: N/A;    FACIAL FRACTURE SURGERY      PACEMAKER IMPLANTATION        General Information       Row Name 04/25/24 1037          Physical Therapy Time and Intention    Document Type evaluation  -AE     Mode of Treatment physical therapy  -AE       Row Name 04/25/24 1037          General Information    Patient Profile Reviewed yes  -AE     Prior Level of Function independent:;all household mobility;community mobility;ADL's;driving  -AE     Existing Precautions/Restrictions other (see comments);orthostatic hypotension  monitor BP  -AE     Barriers to Rehab medically complex  -AE       Row Name 04/25/24 1037          Living Environment    People in Home significant other  -AE       Row Name 04/25/24 1037          Home Main Entrance    Number of Stairs, Main Entrance one  -AE     Stair Railings, Main Entrance none  -AE       Row Name 04/25/24 1037          Stairs Within Home, Primary    Number of Stairs, Within Home, Primary none  -AE     Stair Railings, Within Home, Primary none  -AE       Row Name 04/25/24 1037          Cognition    Orientation Status (Cognition) oriented x 4  -AE       Row Name 04/25/24 1037          Safety Issues, Functional Mobility    Safety Issues Affecting Function (Mobility) awareness of need for assistance;insight into deficits/self-awareness;safety precaution awareness;sequencing abilities  -AE     Impairments Affecting Function (Mobility) endurance/activity tolerance;strength  -AE               User Key  (r) = Recorded By, (t) = Taken By, (c) =  Cosigned By      Initials Name Provider Type    AE Jeff Carbone, PT Physical Therapist                   Mobility       Row Name 04/25/24 1038          Bed Mobility    Bed Mobility supine-sit;sit-supine  -AE     Supine-Sit [place] (Bed Mobility) standby assist  -AE     Sit-Supine [place] (Bed Mobility) standby assist  -AE     Assistive Device (Bed Mobility) bed rails;head of bed elevated  -AE     Comment, (Bed Mobility) VCs for hand placement and sequencing.  -AE       Row Name 04/25/24 1038          Transfers    Comment, (Transfers) VCs for hand placement and sequencing. Good balance noted with STS.  -AE       Row Name 04/25/24 1038          Sit-Stand Transfer    Sit-Stand [place] (Transfers) independent  -AE       Row Name 04/25/24 1038          Gait/Stairs (Locomotion)    [place] Level (Gait) contact guard;1 person assist;verbal cues  -AE     Distance in Feet (Gait) 40  -AE     Deviations/Abnormal Patterns (Gait) bilateral deviations;gait speed decreased;stride length decreased  -AE     Comment, (Gait/Stairs) Pt demo good balance with mobility, continues to have SOA with activity but demo good pacing. Pt required increased cues to improve safety awareness with BP drops. Further distance limited by fatigue.  -AE               User Key  (r) = Recorded By, (t) = Taken By, (c) = Cosigned By      Initials Name Provider Type    Jeff Cavanaugh PT Physical Therapist                   Obj/Interventions       Row Name 04/25/24 1049          Balance    Balance Assessment sitting static balance;sitting dynamic balance;sit to stand dynamic balance;standing static balance;standing dynamic balance  -AE     Static Sitting Balance standby assist  -AE     Dynamic Sitting Balance standby assist  -AE     Position, Sitting Balance unsupported;sitting edge of bed  -AE     Sit to Stand Dynamic Balance contact guard;1-person assist;verbal cues  -AE     Static Standing Balance contact guard  -AE     Dynamic  Standing Balance contact guard  -AE     Position/Device Used, Standing Balance unsupported  -AE               User Key  (r) = Recorded By, (t) = Taken By, (c) = Cosigned By      Initials Name Provider Type    AE Jeff Carbone, PT Physical Therapist                   Goals/Plan       Row Name 04/25/24 1055          Bed Mobility Goal 1 (PT)    Activity/Assistive Device (Bed Mobility Goal 1, PT) sit to supine/supine to sit  -AE     [place] Level/Cues Needed (Bed Mobility Goal 1, PT) independent  -AE     Time Frame (Bed Mobility Goal 1, PT) long term goal (LTG);10 days  -AE     Progress/Outcomes (Bed Mobility Goal 1, PT) new goal  -AE       Row Name 04/25/24 1055          Transfer Goal 1 (PT)    Activity/Assistive Device (Transfer Goal 1, PT) sit-to-stand/stand-to-sit;bed-to-chair/chair-to-bed  -AE     [place] Level/Cues Needed (Transfer Goal 1, PT) independent  -AE     Time Frame (Transfer Goal 1, PT) long term goal (LTG);10 days  -AE     Progress/Outcome (Transfer Goal 1, PT) new goal  -AE       Row Name 04/25/24 1055          Gait Training Goal 1 (PT)    Activity/Assistive Device (Gait Training Goal 1, PT) gait (walking locomotion)  -AE     [place] Level (Gait Training Goal 1, PT) independent  -AE     Distance (Gait Training Goal 1, PT) 300ft  -AE     Time Frame (Gait Training Goal 1, PT) long term goal (LTG);10 days  -AE     Progress/Outcome (Gait Training Goal 1, PT) new goal  -AE       Row Name 04/25/24 1055          Stairs Goal 1 (PT)    Activity/Assistive Device (Stairs Goal 1, PT) ascending stairs;descending stairs;step-to-step  -AE     [place] Level/Cues Needed (Stairs Goal 1, PT) standby assist  -AE     Number of Stairs (Stairs Goal 1, PT) 1  -AE     Time Frame (Stairs Goal 1, PT) long term goal (LTG);10 days  -AE     Progress/Outcome (Stairs Goal 1, PT) new goal  -AE       Row Name 04/25/24 1055          Therapy Assessment/Plan (PT)    Planned Therapy Interventions (PT) balance  training;bed mobility training;gait training;home exercise program;patient/family education;postural re-education;ROM (range of motion);stair training;strengthening;transfer training  -AE               User Key  (r) = Recorded By, (t) = Taken By, (c) = Cosigned By      Initials Name Provider Type    AE Jeff Carbone, PT Physical Therapist                   Clinical Impression       Row Name 04/25/24 1050          Pain    Pretreatment Pain Rating 0/10 - no pain  -AE     Posttreatment Pain Rating 0/10 - no pain  -AE       Row Name 04/25/24 1050          Plan of Care Review    Plan of Care Reviewed With patient  -AE     Progress no change  -AE     Outcome Evaluation Pt presents with decreased functional mobility and decreased endurance with activity. Pt ambulated 40ft in room with CGA, unsupported. Recommend continued skilled IP PT interventions. Recommend D/C home with assist and HHPT.  -AE       Row Name 04/25/24 1050          Therapy Assessment/Plan (PT)    Rehab Potential (PT) fair, will monitor progress closely  -AE     Criteria for Skilled Interventions Met (PT) yes  -AE     Therapy Frequency (PT) daily  -AE       Row Name 04/25/24 1050          Vital Signs    Pre Systolic BP Rehab 99  -AE     Pre Treatment Diastolic BP 50  -AE     Intra Systolic BP Rehab 83  83/51 sitting EOB; 97/53 sitting EOB after BLE ther ex; 94/58 standing at EOB  -AE     Intra Treatment Diastolic BP 51  -AE     Post Systolic BP Rehab 95  -AE     Post Treatment Diastolic BP 54  -AE     Pretreatment Heart Rate (beats/min) 70  -AE     Posttreatment Heart Rate (beats/min) 72  -AE     Pre SpO2 (%) 96  -AE     O2 Delivery Pre Treatment room air  -AE     O2 Delivery Intra Treatment room air  -AE     Post SpO2 (%) 97  -AE     O2 Delivery Post Treatment room air  -AE     Pre Patient Position Supine  -AE     Intra Patient Position Standing  -AE     Post Patient Position Supine  -AE       Row Name 04/25/24 1050          Positioning and Restraints     Pre-Treatment Position in bed  -AE     Post Treatment Position bed  -AE     In Bed notified nsg;fowlers;call light within reach;encouraged to call for assist;exit alarm on;with family/caregiver  -AE               User Key  (r) = Recorded By, (t) = Taken By, (c) = Cosigned By      Initials Name Provider Type    AE Jeff Carbone, DENEEN Physical Therapist                   Outcome Measures       Row Name 04/25/24 1056          How much help from another person do you currently need...    Turning from your back to your side while in flat bed without using bedrails? 4  -AE     Moving from lying on back to sitting on the side of a flat bed without bedrails? 4  -AE     Moving to and from a bed to a chair (including a wheelchair)? 4  -AE     Standing up from a chair using your arms (e.g., wheelchair, bedside chair)? 4  -AE     Climbing 3-5 steps with a railing? 3  -AE     To walk in hospital room? 3  -AE     AM-PAC 6 Clicks Score (PT) 22  -AE     Highest Level of Mobility Goal 7 --> Walk 25 feet or more  -AE       Row Name 04/25/24 1056 04/25/24 0843       Functional Assessment    Outcome Measure Options AM-PAC 6 Clicks Basic Mobility (PT)  -AE AM-PAC 6 Clicks Daily Activity (OT)  -AC              User Key  (r) = Recorded By, (t) = Taken By, (c) = Cosigned By      Initials Name Provider Type    AC Gely Stahl, OT Occupational Therapist    AE Jeff Carbone, PT Physical Therapist                                 Physical Therapy Education       Title: PT OT SLP Therapies (In Progress)       Topic: Physical Therapy (In Progress)       Point: Mobility training (In Progress)       Learning Progress Summary             Patient Acceptance, E, NR by AE at 4/25/2024 0934                         Point: Home exercise program (Not Started)       Learner Progress:  Not documented in this visit.              Point: Body mechanics (In Progress)       Learning Progress Summary             Patient Acceptance, E, NR by AE at  4/25/2024 0934                         Point: Precautions (In Progress)       Learning Progress Summary             Patient Acceptance, E, NR by AE at 4/25/2024 0934                                         User Key       Initials Effective Dates Name Provider Type Discipline    AE 09/21/21 -  Jeff Carbone PT Physical Therapist PT                  PT Recommendation and Plan  Planned Therapy Interventions (PT): balance training, bed mobility training, gait training, home exercise program, patient/family education, postural re-education, ROM (range of motion), stair training, strengthening, transfer training  Plan of Care Reviewed With: patient  Progress: no change  Outcome Evaluation: Pt presents with decreased functional mobility and decreased endurance with activity. Pt ambulated 40ft in room with CGA, unsupported. Recommend continued skilled IP PT interventions. Recommend D/C home with assist and HHPT.     Time Calculation:   PT Evaluation Complexity  History, PT Evaluation Complexity: 1-2 personal factors and/or comorbidities  Examination of Body Systems (PT Eval Complexity): total of 3 or more elements  Clinical Presentation (PT Evaluation Complexity): stable  Clinical Decision Making (PT Evaluation Complexity): low complexity  Overall Complexity (PT Evaluation Complexity): low complexity     PT Charges       Row Name 04/25/24 1058             Time Calculation    Start Time 0934  -AE      PT Received On 04/25/24  -AE      PT Goal Re-Cert Due Date 05/05/24  -AE         Untimed Charges    PT Eval/Re-eval Minutes 48  -AE         Total Minutes    Untimed Charges Total Minutes 48  -AE       Total Minutes 48  -AE                User Key  (r) = Recorded By, (t) = Taken By, (c) = Cosigned By      Initials Name Provider Type    AE Jeff Carbone PT Physical Therapist                  Therapy Charges for Today       Code Description Service Date Service Provider Modifiers Qty    [id number] HC PT EVAL LOW  COMPLEXITY 4 4/25/2024 Jeff Carbone, PT GP 1            PT G-Codes  Outcome Measure Options: AM-PAC 6 Clicks Basic Mobility (PT)  AM-PAC 6 Clicks Score (PT): 22  AM-PAC 6 Clicks Score (OT): 23  PT Discharge Summary  Anticipated Discharge Disposition (PT): home with assist, home with home health    Jeff Carbone PT  4/25/2024     [facility]

## 2024-04-25 NOTE — CONSULTS
Referring Provider: Mary Mitchell DO  Reason for Consultation: ARACELI     Subjective     Chief complaint Weakness     History of present illness: This  is a 74 y.o. male with history of non-small cell lung ca s/p neoadjuvant chemoimmunotherapy and RLL lobectomy currently on Opdivo (last 4/4/24), HTN, emphysema, current tobacco use, recent admission (4/12-4/22)  for sepsis related to pyelonephritis who presented back with vague abd pain, nausea/vomiting and weaknes. Patient states that he has not been eating well.  Denies any fever, chills, rigors, rash, hematuria or hemoptysis. No family hx of kidney disease or autoimmune disease. Nephrology service has been consulted for ARACELI management     History  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    Diabetes mellitus Borderline    Emphysema/COPD     Erectile disorder     GERD (gastroesophageal reflux disease)     History of chemotherapy     Hyperlipidemia     Hypertension 2019    Lung nodule     Mumps     Mumps     Pruritus     after bath    Slow to wake up after anesthesia     Wears dentures     upper only    Wears hearing aid in both ears     usually only wears right   ,   Past Surgical History:   Procedure Laterality Date    BONE BIOPSY      broken bone surgery in his face    BRONCHOSCOPY THORACOTOMY Right 1/9/2024    Procedure: THORACOTOMY FOR LOWER LOBECTOMY AND MEDISTINAL LYMPH NODE DISSECTION RIGHT;  Surgeon: Joey Patel MD;  Location: [facility] OR;  Service: Cardiothoracic;  Laterality: Right;    BRONCHOSCOPY WITH ION ROBOTIC ASSIST N/A 09/15/2023    Procedure: BRONCHOSCOPY NAVIGATION WITH ENDOBRONCHIAL ULTRASOUND AND ION ROBOT;  Surgeon: Octaviano Sampson MD;  Location: [facility] ENDOSCOPY;  Service: Robotics - Pulmonary;  Laterality: N/A;  ion #6 - 0032  - 0015  Cath guide 0061    EBUS balloon removed and intact    CARDIAC ELECTROPHYSIOLOGY  PROCEDURE N/A 08/17/2021    Procedure: Pacemaker DC new;  Surgeon: Kayy Box MD;  Location: [facility] CATH INVASIVE LOCATION;  Service: Cardiology;  Laterality: N/A;    FACIAL FRACTURE SURGERY      PACEMAKER IMPLANTATION     ,   Family History   Problem Relation Age of Onset    Aneurysm Mother         brain    Dementia Father     Leukemia Sister     Heart disease Paternal Grandmother     Hypertension Paternal Grandfather    ,   Social History     Socioeconomic History    Marital status: Significant Other    Number of children: 3   Tobacco Use    Smoking status: Former     Current packs/day: 0.50     Average packs/day: 0.5 packs/day for 56.3 years (28.7 ttl pk-yrs)     Types: Cigarettes     Start date: 1/1/1968    Smokeless tobacco: Never    Tobacco comments:     Pt states that he quit smoking on 1/8/24. The day before his sx.    Vaping Use    Vaping status: Never Used   Substance and Sexual Activity    Alcohol use: Never    Drug use: Never    Sexual activity: Defer     Partners: Female     Birth control/protection: None     E-cigarette/Vaping    E-cigarette/Vaping Use Never User      E-cigarette/Vaping Substances    Nicotine No     THC No     CBD No     Flavoring No      E-cigarette/Vaping Devices    Disposable No     Pre-filled or Refillable Cartridge No     Refillable Tank No     Pre-filled Pod No          ,   Medications Prior to Admission   Medication Sig Dispense Refill Last Dose    albuterol sulfate  (90 Base) MCG/ACT inhaler Inhale 2 puffs Every 4 (Four) Hours As Needed for Wheezing. (Patient taking differently: Inhale 2 puffs Every 4 (Four) Hours As Needed for Wheezing or Shortness of Air.) 18 g 11 Past Month    amLODIPine (NORVASC) 5 MG tablet Take 1 tablet by mouth Daily for 30 days. 30 tablet 0 4/23/2024    B Complex Vitamins (vitamin b complex) capsule capsule Take 1 capsule by mouth Daily. OTC   4/23/2024    ferrous sulfate 325 (65 FE) MG tablet Take 1 tablet by mouth Daily With  Breakfast. OTC   4/23/2024    fluticasone (VERAMYST) 27.5 MCG/SPRAY nasal spray 2 sprays into the nostril(s) as directed by provider 1 (One) Time As Needed for Rhinitis or Allergies. 6 mL 2 Past Month    Fluticasone-Umeclidin-Vilant (Trelegy Ellipta) 100-62.5-25 MCG/ACT inhaler Inhale 1 puff Daily. 3 each 3 4/23/2024    HYDROcodone-acetaminophen (Norco) 7.5-325 MG per tablet Take 1 tablet by mouth Every 6 (Six) Hours As Needed for Moderate Pain. 60 tablet 0 Past Week    lidocaine-prilocaine (EMLA) 2.5-2.5 % cream Apply 1 application  topically to the appropriate area as directed As Needed (45-60 minutes prior to port access.  Cover with saran/plastic wrap.). 30 g 3 Past Month    [START ON 5/3/2024] omeprazole (priLOSEC) 40 MG capsule Take 1 capsule by mouth Daily. 30 capsule 5 4/23/2024    ondansetron (ZOFRAN) 8 MG tablet Take 1 tablet by mouth 3 (Three) Times a Day As Needed for Nausea or Vomiting. 30 tablet 2 4/24/2024    pravastatin (PRAVACHOL) 80 MG tablet Take 1 tablet by mouth Every Night. 30 tablet 11 4/23/2024    sildenafil (VIAGRA) 100 MG tablet Take 0.5 tablets by mouth Daily As Needed for Erectile Dysfunction.   Unknown   , Scheduled Meds:  [Held by provider] amLODIPine, 5 mg, Oral, Q24H  budesonide-formoterol, 2 puff, Inhalation, BID - RT   And  tiotropium bromide monohydrate, 2 puff, Inhalation, Daily - RT  cefepime, 2,000 mg, Intravenous, Q24H  DAPTOmycin, 8 mg/kg (Adjusted), Intravenous, Q48H  ferrous sulfate, 325 mg, Oral, Daily With Breakfast  fluticasone, 2 spray, Each Nare, Daily  heparin (porcine), 5,000 Units, Subcutaneous, Q12H  pantoprazole, 40 mg, Oral, Q AM  pravastatin, 80 mg, Oral, Nightly  sodium bicarbonate, 650 mg, Oral, BID  sodium chloride, 10 mL, Intravenous, Q12H   , Continuous Infusions:  sodium chloride, 100 mL/hr, Last Rate: 100 mL/hr (04/25/24 0817)   , PRN Meds:    acetaminophen **OR** acetaminophen **OR** acetaminophen    calcium carbonate    HYDROcodone-acetaminophen     nitroglycerin    ondansetron ODT **OR** ondansetron    Sodium Chloride (PF)    sodium chloride    sodium chloride, and Allergies:  Cymbalta [duloxetine hcl], Gabapentin, Remeron [mirtazapine], Toradol [ketorolac tromethamine], Latex, and Tape    Review of Systems  Pertinent items are noted in HPI    Objective     Vital Signs  Temp:  [97.7 °F (36.5 °C)-98.8 °F (37.1 °C)] 97.7 °F (36.5 °C)  Heart Rate:  [70-92] 70  Resp:  [16-20] 18  BP: ()/(47-76) 90/50    No intake/output data recorded.  I/O last 3 completed shifts:  In: 2100 [IV Piggyback:2100]  Out: 1325 [Urine:1175; Emesis/NG output:150]    Physical Exam:  General Appearance:    Alert, cooperative, in no acute distress   Head:    Normocephalic, without obvious abnormality, atraumatic   Eyes:            Conjunctivae and sclerae normal, no   icterus, no pallor, corneas clear, PERRLA           Neck:   Supple, trachea midline, no thyromegaly,  no JVD       Lungs:     Clear to auscultation,respirations regular, even and               unlabored    Heart:    Regular rhythm and normal rate, normal S1 and S2, no       murmur, no gallop, no rub, no click       Abdomen:     Normal bowel sounds,soft, non-tender, non-distended, no guarding, no rebound tenderness       Extremities:   Moves all extremities well, no edema, no cyanosis, no         redness   Pulses:   Pulses palpable and equal bilaterally           Neurologic:   Cranial nerves 2 - 12 grossly intact, no focal deficit         Results Review:   I reviewed the patient's new clinical results.    WBC WBC   Date Value Ref Range Status   04/25/2024 45.90 (C) 3.40 - 10.80 10*3/mm3 Final   04/24/2024 39.14 (C) 3.40 - 10.80 10*3/mm3 Final      HGB Hemoglobin   Date Value Ref Range Status   04/25/2024 7.8 (L) 13.0 - 17.7 g/dL Final   04/24/2024 9.9 (L) 13.0 - 17.7 g/dL Final      HCT Hematocrit   Date Value Ref Range Status   04/25/2024 24.4 (L) 37.5 - 51.0 % Final   04/24/2024 30.9 (L) 37.5 - 51.0 % Final      Platlets  "No results found for: \"LABPLAT\"   MCV MCV   Date Value Ref Range Status   04/25/2024 97.2 (H) 79.0 - 97.0 fL Final   04/24/2024 96.6 79.0 - 97.0 fL Final          Sodium Sodium   Date Value Ref Range Status   04/25/2024 139 136 - 145 mmol/L Final   04/24/2024 140 136 - 145 mmol/L Final      Potassium Potassium   Date Value Ref Range Status   04/25/2024 3.2 (L) 3.5 - 5.2 mmol/L Final   04/24/2024 3.8 3.5 - 5.2 mmol/L Final      Chloride Chloride   Date Value Ref Range Status   04/25/2024 110 (H) 98 - 107 mmol/L Final   04/24/2024 108 (H) 98 - 107 mmol/L Final      CO2 CO2   Date Value Ref Range Status   04/25/2024 15.0 (L) 22.0 - 29.0 mmol/L Final   04/24/2024 16.0 (L) 22.0 - 29.0 mmol/L Final      BUN BUN   Date Value Ref Range Status   04/25/2024 22 8 - 23 mg/dL Final   04/24/2024 24 (H) 8 - 23 mg/dL Final      Creatinine Creatinine   Date Value Ref Range Status   04/25/2024 3.22 (H) 0.76 - 1.27 mg/dL Final   04/24/2024 3.25 (H) 0.76 - 1.27 mg/dL Final      Calcium Calcium   Date Value Ref Range Status   04/25/2024 7.5 (L) 8.6 - 10.5 mg/dL Final   04/24/2024 9.0 8.6 - 10.5 mg/dL Final      PO4 No results found for: \"CAPO4\"   Albumin Albumin   Date Value Ref Range Status   04/25/2024 2.8 (L) 3.5 - 5.2 g/dL Final   04/24/2024 3.5 3.5 - 5.2 g/dL Final      Magnesium Magnesium   Date Value Ref Range Status   04/25/2024 1.1 (L) 1.6 - 2.4 mg/dL Final      Uric Acid No results found for: \"URICACID\"         [Held by provider] amLODIPine, 5 mg, Oral, Q24H  budesonide-formoterol, 2 puff, Inhalation, BID - RT   And  tiotropium bromide monohydrate, 2 puff, Inhalation, Daily - RT  cefepime, 2,000 mg, Intravenous, Q24H  DAPTOmycin, 8 mg/kg (Adjusted), Intravenous, Q48H  ferrous sulfate, 325 mg, Oral, Daily With Breakfast  fluticasone, 2 spray, Each Nare, Daily  heparin (porcine), 5,000 Units, Subcutaneous, Q12H  pantoprazole, 40 mg, Oral, Q AM  pravastatin, 80 mg, Oral, Nightly  sodium bicarbonate, 650 mg, Oral, BID  sodium " chloride, 10 mL, Intravenous, Q12H      sodium chloride, 100 mL/hr, Last Rate: 100 mL/hr (04/25/24 0817)      Results from last 7 days   Lab Units 04/25/24  0727 04/24/24  0922 04/22/24  0715 04/21/24  0403 04/20/24  0528 04/19/24  0323   SODIUM mmol/L 139 140 142 141 140 139   POTASSIUM mmol/L 3.2* 3.8 4.0 4.0 4.1 4.2   CHLORIDE mmol/L 110* 108* 108* 109* 107 110*   CO2 mmol/L 15.0* 16.0* 19.0* 17.0* 19.0* 17.0*   BUN mg/dL 22 24* 26* 32* 38* 45*   CREATININE mg/dL 3.22* 3.25* 4.19* 5.22* 6.41* 7.29*   CALCIUM mg/dL 7.5* 9.0 8.7 8.5* 8.3* 8.0*   ALBUMIN g/dL 2.8* 3.5 3.3* 2.9* 2.9* 2.8*   WBC 10*3/mm3 45.90* 39.14*  --   --  10.87* 10.31   HEMOGLOBIN g/dL 7.8* 9.9*  --   --  8.0* 7.6*   PLATELETS 10*3/mm3 287 384  --   --  280 219           Assessment & Plan       Sepsis    Presence of cardiac pacemaker    Mixed hyperlipidemia    Tobacco abuse    Bronchogenic cancer of right lung    Malignant neoplasm of lower lobe of right lung    ARACELI (acute kidney injury)    Severe malnutrition    Leukocytosis    1- ARACELI - non oliguric - baseline Scr 0.8- At discharge Scr 4.19 Peaked at 8.49 thought to be secondary to ATN and pyelonephritis associated injury - today 3.22.   2- HTN   3- Hypokalemia   4- Metabolic acidosis   5- Anemia   6- Leukocytosis   7- Small cell Cancer - Follows with Dr Gely Ashley     Plan:  - LR with KCL   - Albumin infusion   - Continue with midodrine   - Hold Antihypertensive meds.   - Monitor I/O   - Avoid nephrotoxic agents.   - Renal diet   - Adjust meds per renal function   - No emergent need of RRT   - Monitor H/H and transfuse for Hgb less than 7.0         I discussed the patients findings and my recommendations with patient    Kurt Ríos MD  04/25/24

## 2024-04-25 NOTE — OUTREACH NOTE
Sepsis Week 2 Survey      Flowsheet Row Responses   [facility] facility patient discharged from? [place]   Does the patient have one of the following disease processes/diagnoses(primary or secondary)? Sepsis   Week 2 attempt successful? No   Unsuccessful attempts Attempt 1   Revoke Readmitted            DARVIN HECTOR - Registered Nurse

## 2024-04-25 NOTE — THERAPY DISCHARGE NOTE
Acute Care - Occupational Therapy Discharge  [facility]    Patient Name: David Barfield  : 1950    MRN: [id number]                              Today's Date: 2024       Admit Date: 2024    Visit Dx:     ICD-10-CM ICD-9-CM   1. Hypotension, unspecified hypotension type  I95.9 458.9   2. Sepsis without acute organ dysfunction, due to unspecified organism  A41.9 038.9     995.91   3. Leukocytosis, unspecified type  D72.829 288.60     Patient Active Problem List   Diagnosis    High degree atrioventricular block    Bradycardia, sinus    Chronic hypotension    PVC's (premature ventricular contractions)    Presence of cardiac pacemaker    Mixed hyperlipidemia    Centrilobular emphysema    Nodule of lower lobe of right lung    Mediastinal adenopathy    Cough    Tobacco abuse    Bronchogenic cancer of right lung    Malignant neoplasm of lower lobe of right lung    Primary hypertension    GERD without esophagitis    Sepsis    Acute pyelonephritis    ARACELI (acute kidney injury)    Hypokalemia    Severe malnutrition    Leukocytosis     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    Diabetes mellitus Borderline    Emphysema/COPD     Erectile disorder     GERD (gastroesophageal reflux disease)     History of chemotherapy     Hyperlipidemia     Hypertension 2019    Lung nodule     Mumps     Mumps     Pruritus     after bath    Slow to wake up after anesthesia     Wears dentures     upper only    Wears hearing aid in both ears     usually only wears right     Past Surgical History:   Procedure Laterality Date    BONE BIOPSY      broken bone surgery in his face    BRONCHOSCOPY THORACOTOMY Right 2024    Procedure: THORACOTOMY FOR LOWER LOBECTOMY AND MEDISTINAL LYMPH NODE DISSECTION RIGHT;  Surgeon: Joey Patel MD;  [facility]: [facility];  Service: Cardiothoracic;  Laterality: Right;     BRONCHOSCOPY WITH ION ROBOTIC ASSIST N/A 09/15/2023    Procedure: BRONCHOSCOPY NAVIGATION WITH ENDOBRONCHIAL ULTRASOUND AND ION ROBOT;  Surgeon: Octaviano Sampson MD;  Location:  [facility];  Service: Robotics - Pulmonary;  Laterality: N/A;  ion #6 - 0032  - 0015  Cath guide 0061    EBUS balloon removed and intact    CARDIAC ELECTROPHYSIOLOGY PROCEDURE N/A 08/17/2021    Procedure: Pacemaker DC new;  Surgeon: Kayy Box MD;  Location:  [facility] CATH INVASIVE LOCATION;  Service: Cardiology;  Laterality: N/A;    FACIAL FRACTURE SURGERY      PACEMAKER IMPLANTATION        General Information       Row Name 04/25/24 0735          OT Time and Intention    Document Type discharge evaluation/summary  -AC     Mode of Treatment occupational therapy  -AC       Row Name 04/25/24 0735          General Information    Patient Profile Reviewed yes  -AC     Prior Level of Function independent:;all household mobility;community mobility;ADL's;driving  no AD to ambulate  -AC     Existing Precautions/Restrictions --  monitor BP  -AC     Barriers to Rehab medically complex  -AC       Row Name 04/25/24 0735          Occupational Profile    Environmental Supports and Barriers (Occupational Profile) walk in shower with shower seat  -AC       Row Name 04/25/24 0735          Living Environment    People in Home significant other  -AC       Row Name 04/25/24 0735          Home Main Entrance    Number of Stairs, Main Entrance one  -AC       Row Name 04/25/24 0735          Stairs Within Home, Primary    Stairs, Within Home, Primary 1 story  -AC       Row Name 04/25/24 0735          Cognition    Orientation Status (Cognition) oriented x 4  -AC       Row Name 04/25/24 0735          Safety Issues, Functional Mobility    Impairments Affecting Function (Mobility) endurance/activity tolerance  -AC               User Key  (r) = Recorded By, (t) = Taken By, (c) = Cosigned By      Initials Name Provider Type    AC Gely Stahl, OT  Occupational Therapist                   Mobility/ADL's       Row Name 04/25/24 0838          Bed Mobility    Bed Mobility supine-sit;sit-supine  -     Supine-Sit [place] (Bed Mobility) modified independence  -     Sit-Supine [place] (Bed Mobility) modified independence  -     Assistive Device (Bed Mobility) bed rails;head of bed elevated  -       Row Name 04/25/24 0838          Transfers    Transfers sit-stand transfer  -       Row Name 04/25/24 0838          Sit-Stand Transfer    Sit-Stand [place] (Transfers) independent  -       Row Name 04/25/24 0838          Functional Mobility    Functional Mobility- Ind. Level supervision required  -     Functional Mobility-Distance (Feet) --  in room  -       Row Name 04/25/24 0838          Activities of Daily Living    BADL Assessment/Intervention lower body dressing;grooming  -       Row Name 04/25/24 0838          Lower Body Dressing Assessment/Training    [place] Level (Lower Body Dressing) don;socks;independent  -     Position (Lower Body Dressing) edge of bed sitting  -       Row Name 04/25/24 0838          Grooming Assessment/Training    [place] Level (Grooming) hair care, combing/brushing;wash face, hands;independent  -AC     Position (Grooming) edge of bed sitting  -               User Key  (r) = Recorded By, (t) = Taken By, (c) = Cosigned By      Initials Name Provider Type    Gely Marsh OT Occupational Therapist                   Obj/Interventions       Row Name 04/25/24 0839          Sensory Assessment (Somatosensory)    Sensory Assessment (Somatosensory) UE sensation intact  -       Row Name 04/25/24 0839          Vision Assessment/Intervention    Visual Impairment/Limitations WFL  -       Row Name 04/25/24 0839          Range of Motion Comprehensive    General Range of Motion bilateral upper extremity ROM WNL  -       Row Name 04/25/24 0839          Strength Comprehensive (MMT)    Comment, General  Manual Muscle Testing (MMT) Assessment BUE grossly 4/5  -               User Key  (r) = Recorded By, (t) = Taken By, (c) = Cosigned By      Initials Name Provider Type    Gely Marsh, OT Occupational Therapist                   Goals/Plan    No documentation.                  Clinical Impression       Row Name 04/25/24 0839          Pain Assessment    Pretreatment Pain Rating 0/10 - no pain  -     Posttreatment Pain Rating 0/10 - no pain  -AC       Row Name 04/25/24 0839          Plan of Care Review    Plan of Care Reviewed With patient  -     Outcome Evaluation Pt demo ind with grooming,  ind LBD, supervision to ambulate in room without AD.  Pt with drop in BP with activity 83/58 - asymptomatic.   Pt does not demo any deficits which would require OT intervention at this time.  Recommend home with assist upon d/c.  -AC       Row Name 04/25/24 0839          Therapy Assessment/Plan (OT)    Criteria for Skilled Therapeutic Interventions Met (OT) no;no problems identified which require skilled intervention  -     Therapy Frequency (OT) evaluation only  -AC       Row Name 04/25/24 0839          Therapy Plan Review/Discharge Plan (OT)    Anticipated Discharge Disposition (OT) home with assist  -AC       Row Name 04/25/24 0839          Vital Signs    Pre Systolic BP Rehab 99  -AC     Pre Treatment Diastolic BP 49  -AC     Post Systolic BP Rehab 83  -AC     Post Treatment Diastolic BP 58  -AC     Pretreatment Heart Rate (beats/min) 83  -AC     Posttreatment Heart Rate (beats/min) 70  -AC     Post SpO2 (%) 98  -AC     O2 Delivery Post Treatment room air  -AC     Pre Patient Position Supine  -AC     Post Patient Position Supine  -AC       Row Name 04/25/24 0839          Positioning and Restraints    Pre-Treatment Position in bed  -AC     Post Treatment Position bed  -AC     In Bed notified nsg;fowlers;call light within reach;encouraged to call for assist;exit alarm on  -               User Key  (r) = Recorded  By, (t) = Taken By, (c) = Cosigned By      Initials Name Provider Type    Gely Marsh, OT Occupational Therapist                   Outcome Measures       Row Name 04/25/24 0843          How much help from another is currently needed...    Putting on and taking off regular lower body clothing? 4  -AC     Bathing (including washing, rinsing, and drying) 3  supervision d/t BP  -AC     Toileting (which includes using toilet bed pan or urinal) 4  -AC     Putting on and taking off regular upper body clothing 4  -AC     Taking care of personal grooming (such as brushing teeth) 4  -AC     Eating meals 4  -AC     AM-PAC 6 Clicks Score (OT) 23  -AC       Row Name 04/25/24 0843          Functional Assessment    Outcome Measure Options AM-PAC 6 Clicks Daily Activity (OT)  -AC               User Key  (r) = Recorded By, (t) = Taken By, (c) = Cosigned By      Initials Name Provider Type    Gely Marsh, OT Occupational Therapist                  Occupational Therapy Education       Title: PT OT SLP Therapies (In Progress)       Topic: Occupational Therapy (In Progress)       Point: ADL training (Done)       Description:   Instruct learner(s) on proper safety adaptation and remediation techniques during self care or transfers.   Instruct in proper use of assistive devices.                  Learning Progress Summary             Patient Acceptance, E, VU by  at 4/25/2024 0843   Significant Other Acceptance, E, VU by  at 4/25/2024 0843                         Point: Home exercise program (Not Started)       Description:   Instruct learner(s) on appropriate technique for monitoring, assisting and/or progressing therapeutic exercises/activities.                  Learner Progress:  Not documented in this visit.              Point: Precautions (Not Started)       Description:   Instruct learner(s) on prescribed precautions during self-care and functional transfers.                  Learner Progress:  Not documented in this  visit.              Point: Body mechanics (Not Started)       Description:   Instruct learner(s) on proper positioning and spine alignment during self-care, functional mobility activities and/or exercises.                  Learner Progress:  Not documented in this visit.                              User Key       Initials Effective Dates Name Provider Type Discipline     02/03/23 -  Gely Stahl, OT Occupational Therapist OT                  OT Recommendation and Plan  Therapy Frequency (OT): evaluation only  Plan of Care Review  Plan of Care Reviewed With: patient  Outcome Evaluation: Pt demo ind with grooming,  ind LBD, supervision to ambulate in room without AD.  Pt with drop in BP with activity 83/58 - asymptomatic.   Pt does not demo any deficits which would require OT intervention at this time.  Recommend home with assist upon d/c.  Plan of Care Reviewed With: patient  Outcome Evaluation: Pt demo ind with grooming,  ind LBD, supervision to ambulate in room without AD.  Pt with drop in BP with activity 83/58 - asymptomatic.   Pt does not demo any deficits which would require OT intervention at this time.  Recommend home with assist upon d/c.     Time Calculation:   Evaluation Complexity (OT)  Review Occupational Profile/Medical/Therapy History Complexity: brief/low complexity  Assessment, Occupational Performance/Identification of Deficit Complexity: 1-3 performance deficits  Clinical Decision Making Complexity (OT): problem focused assessment/low complexity  Overall Complexity of Evaluation (OT): low complexity     Time Calculation- OT       Row Name 04/25/24 0735             Time Calculation- OT    OT Start Time 0735  -AC      OT Received On 04/25/24  -AC      OT Goal Re-Cert Due Date 05/05/24  -AC         Untimed Charges    OT Eval/Re-eval Minutes 40  -AC         Total Minutes    Untimed Charges Total Minutes 40  -AC       Total Minutes 40  -AC                User Key  (r) = Recorded By, (t) = Taken By,  (c) = Cosigned By      Initials Name Provider Type    AC Gely Stahl, OT Occupational Therapist                  Therapy Charges for Today       Code Description Service Date Service Provider Modifiers Qty    [id number]  OT EVAL LOW COMPLEXITY 3 4/25/2024 Gely Stahl, OT GO 1               OT Discharge Summary  Anticipated Discharge Disposition (OT): home with assist    Gely Stahl OT  4/25/2024

## 2024-04-25 NOTE — CONSULTS
INFECTIOUS DISEASE CONSULT/INITIAL HOSPITAL VISIT    David Barfield  1950  [id number]    Date of consult: 4/25/2024    Admit date: 4/24/2024    Requesting Provider: Dr. Mitchell  Evaluating physician: Derian Barry MD  Reason for Consultation: sepsis; recent pyelonephritis   Chief Complaint: Weakness, nausea/Vomiting      Subjective   History of present illness:  David Barfield is a  74 y.o.  Yr old male, with PMH NSCLC/RLL lobectomy (1/2024) recently on Opdivo (last dose on 4/4/24), HTN, and emphysema who I followed during a recent admission where he presented with nausea, vomiting, diarrhea, low back pain, and weakness.  The patient was noted to have bilateral perinephric stranding on CT scan but urine culture was no growth.  He did have pyuria and urinalysis.  He suffered from acute kidney injury with a creatinine that trended over 8 but was subsequently improving prior to discharge.  His C. Difficile test was negative.  diarrhea subsequently improved.  He received about 10 days of IV antibiotic therapy for  pyelonephritis with clinical improvement in his white blood cell count trended down.  He was discharged home on 4/22.    The patient presented back to the ER for vague abdominal pain, nausea, vomiting, and weakness.  He had initially done okay after his discharge and was eating and drinking okay but woke up with vague abdominal pain down both sides of his abdomen and started having some nausea and vomiting. No diarrhea. His wife was concerned that he would get worse due to dehydration incision called EMS and the patient was transported to the ER.    Since arrival, the patient has remained afebrile. White blood cell count significantly elevated at 39.14 with 91.2% neutrophils.  Lactic acid was 1.6 on arrival.  Pro-calcitonin was 0.48.  Lipase is 23.  LFTs were within normal limits.  Creatinine was 3.25, down from 4.19 at time of discharge.  Urinalysis showed 2+ blood, 2+ protein, 1+ leukocyte  esterase, negative nitrite, 6-10 RBCs, 11-20 WBCs, and no bacteria.  Urine culture is in progress.  Respiratory PCR panel was negative.  Blood cultures are in progress. CT chest showed stable postsurgical changes of the right lower lung with scarring and chronic appearing loculated right pleural effusion.  He did have mild emphysema with biapical scarring.  CT abdomen and pelvis showed no new significant abnormality.  Per radiology, the patient had a significant decrease in perinephric fat stranding about the bilateral kidneys and decreased bladder wall thickening compared to his CT from last admission.  The patient was started on empiric vancomycin and Zosyn by the primary team.  ID has been consult and for recommendations due to concern for sepsis and in the setting of his recent pyelonephritis.        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    Diabetes mellitus Borderline    Emphysema/COPD     Erectile disorder     GERD (gastroesophageal reflux disease)     History of chemotherapy     Hyperlipidemia     Hypertension 2019    Lung nodule     Mumps     Mumps     Pruritus     after bath    Slow to wake up after anesthesia     Wears dentures     upper only    Wears hearing aid in both ears     usually only wears right       Past Surgical History:   Procedure Laterality Date    BONE BIOPSY      broken bone surgery in his face    BRONCHOSCOPY THORACOTOMY Right 1/9/2024    Procedure: THORACOTOMY FOR LOWER LOBECTOMY AND MEDISTINAL LYMPH NODE DISSECTION RIGHT;  Surgeon: Joey Patel MD;  Location: [facility] OR;  Service: Cardiothoracic;  Laterality: Right;    BRONCHOSCOPY WITH ION ROBOTIC ASSIST N/A 09/15/2023    Procedure: BRONCHOSCOPY NAVIGATION WITH ENDOBRONCHIAL ULTRASOUND AND ION ROBOT;  Surgeon: Octaviano Sampson MD;  Location: [facility] ENDOSCOPY;  Service: Robotics - Pulmonary;  Laterality: N/A;   "ion #6 - 0032  - 0015  Cath guide 0061    EBUS balloon removed and intact    CARDIAC ELECTROPHYSIOLOGY PROCEDURE N/A 08/17/2021    Procedure: Pacemaker DC new;  Surgeon: Kayy Box MD;  Location: [facility] INVASIVE LOCATION;  Service: Cardiology;  Laterality: N/A;    FACIAL FRACTURE SURGERY      PACEMAKER IMPLANTATION         Pediatric History   Patient Parents    Not on file     Other Topics Concern    Not on file   Social History Narrative    Lives in [place], [place]       family history includes Aneurysm in his mother; Dementia in his father; Heart disease in his paternal grandmother; Hypertension in his paternal grandfather; Leukemia in his sister.    Allergies   Allergen Reactions    Cymbalta [Duloxetine Hcl] GI Intolerance    Gabapentin Mental Status Change     Pt states that this medication \"makes him feel foolish in his head\".     Remeron [Mirtazapine] Other (See Comments)     Excess sedation    Toradol [Ketorolac Tromethamine] GI Intolerance     Projectile vomiting     Latex Other (See Comments)     Latex allergy     Tape Rash       Immunization History   Administered Date(s) Administered    ABRYSVO (RSV, 60+ or pregnant women 32-36 wks) 10/16/2023    COVID-19 (PFIZER) BIVALENT 12+YRS 09/16/2022    COVID-19 (PFIZER) Purple Cap Monovalent 01/29/2021, 02/19/2021, 10/18/2021, 04/14/2022    COVID-19 F23 (PFIZER) 12YRS+ (COMIRNATY) 09/26/2023    Fluzone High-Dose 65+yrs 10/06/2023    Pneumococcal Conjugate 20-Valent (PCV20) 10/11/2023       Medication:    Current Facility-Administered Medications:     acetaminophen (TYLENOL) tablet 650 mg, 650 mg, Oral, Q4H PRN **OR** acetaminophen (TYLENOL) 160 MG/5ML oral solution 650 mg, 650 mg, Oral, Q4H PRN **OR** acetaminophen (TYLENOL) suppository 650 mg, 650 mg, Rectal, Q4H PRN, Mary Mitchell DO    [Held by provider] amLODIPine (NORVASC) tablet 5 mg, 5 mg, Oral, Q24H, Mary Mitchell DO    budesonide-formoterol (SYMBICORT) 160-4.5 MCG/ACT inhaler 2 " puff, 2 puff, Inhalation, BID - RT, 2 puff at 04/24/24 1945 **AND** tiotropium (SPIRIVA RESPIMAT) 2.5 mcg/act aerosol solution inhaler, 2 puff, Inhalation, Daily - RT, Mary Mitchell DO    calcium carbonate (TUMS) chewable tablet 500 mg (200 mg elemental), 2 tablet, Oral, BID PRN, Mary Mitchell DO    cefepime 2000 mg IVPB in 100 mL NS (MBP), 2,000 mg, Intravenous, Once, Derian Barry MD    cefepime 2000 mg IVPB in 100 mL NS (MBP), 2,000 mg, Intravenous, Q24H, Derian Barry MD    DAPTOmycin (CUBICIN) 650 mg in sodium chloride 0.9 % 50 mL IVPB, 8 mg/kg (Adjusted), Intravenous, Q48H, Derian Barry MD    ferrous sulfate tablet 325 mg, 325 mg, Oral, Daily With Breakfast, Mary Mitchell DO    fluticasone (FLONASE) 50 MCG/ACT nasal spray 2 spray, 2 spray, Each Nare, Daily, Mary Mitchell DO    heparin (porcine) 5000 UNIT/ML injection 5,000 Units, 5,000 Units, Subcutaneous, Q12H, Mary Mitchell DO, 5,000 Units at 04/24/24 2052    HYDROcodone-acetaminophen (NORCO) 7.5-325 MG per tablet 1 tablet, 1 tablet, Oral, Q6H PRN, Mary Mitchell DO    nitroglycerin (NITROSTAT) SL tablet 0.4 mg, 0.4 mg, Sublingual, Q5 Min PRN, Mary Mitchell DO    ondansetron ODT (ZOFRAN-ODT) disintegrating tablet 4 mg, 4 mg, Oral, Q6H PRN **OR** ondansetron (ZOFRAN) injection 4 mg, 4 mg, Intravenous, Q6H PRN, Mary Mitchell DO, 4 mg at 04/25/24 0440    pantoprazole (PROTONIX) EC tablet 40 mg, 40 mg, Oral, Q AM, Mary Mitchell DO, 40 mg at 04/25/24 0444    pravastatin (PRAVACHOL) tablet 80 mg, 80 mg, Oral, Nightly, Mary Mitchell DO, 80 mg at 04/24/24 2052    Sodium Chloride (PF) 0.9 % 10 mL, 10 mL, Intravenous, PRN, Mary Mitchell DO    sodium chloride 0.9 % flush 10 mL, 10 mL, Intravenous, Q12H, Mary Mitchell DO, 10 mL at 04/24/24 2052    sodium chloride 0.9 % flush 10 mL, 10 mL, Intravenous, PRN, Mary Mitchell DO    sodium chloride 0.9 % infusion 40 mL, 40 mL, Intravenous, PRN, Mary Mitchell,  "DO    Please refer to the medical record for a full medication list    Review of Systems:    Constitutional-- No Fever, chills or sweats.  Appetite poor.  + Fatigue.  + Generalized weakness  HEENT-- No new vision, hearing or throat complaints.  No epistaxis or oral sores.  Denies odynophagia or dysphagia.  No odynophagia or dysphagia. No headache, photophobia or neck stiffness.  CV-- No chest pain, palpitation or syncope  Resp-- No SOB/cough/Hemoptysis  GI- + Nausea and vomiting.  No diarrhea. Nonspecific vague bilateral abdominal pain that has improved. No hematochezia, melena, or hematemesis. Denies jaundice or chronic liver disease.  -- No dysuria, hematuria.  Recent flank pain, now improved.   Lymph- no swollen lymph nodes in neck/axilla or groin.   Heme- No active bruising or bleeding;   MS-- no swelling or pain in the bones or joints of arms/legs.  No new back pain.  Neuro-- No acute focal weakness or numbness in the arms or legs.  No seizures.  Skin--No rashes or lesions    Physical Exam:   Vital Signs   Temp:  [98.3 °F (36.8 °C)-98.8 °F (37.1 °C)] 98.8 °F (37.1 °C)  Heart Rate:  [] 76  Resp:  [16-20] 17  BP: ()/(47-76) 99/49    Temp  Min: 98.3 °F (36.8 °C)  Max: 98.8 °F (37.1 °C)  BP  Min: 82/58  Max: 128/70  Pulse  Min: 70  Max: 105  Resp  Min: 16  Max: 20  SpO2  Min: 92 %  Max: 100 %    Blood pressure 99/49, pulse 76, temperature 98.8 °F (37.1 °C), temperature source Oral, resp. rate 17, height 182.9 cm (72\"), weight 79.6 kg (175 lb 8 oz), SpO2 96%.  GENERAL: Awake and alert, in no acute distress. Appears stated age.  Frail  HEENT:  Normocephalic, atraumatic.  Oropharynx without thrush.  Ears externally normal, Nose externally normal.  EYES: PERRL. No conjunctival injection. No icterus.   HEART: RRR, no murmur  LUNGS: Clear to auscultation and percussion. No respiratory distress, no use of accessory muscles.  ABDOMEN: Soft, nontender, nondistended.  MSK: No joint effusions noted.  Full range " "of motion throughout.  No tenderness to palpation over his spine.  No CVA tenderness bilaterally.  GENITAL: no Yeung catheter  SKIN: no generalized rashes.  No peripheral stigmata of infective endocarditis noted.  PSYCHIATRIC: cooperative.  Appropriate mood and affect  EXT:  No cellulitic change.  NEURO: awake alert and oriented ×4.  Normal speech and cognition    Left-sided Mediport site is without any erythema or drainage    Right-sided AICD pocket site is without any erythema or tenderness    Results Review:   I reviewed the patient's new clinical results.  I reviewed the patient's new imaging results and agree with the interpretation.  I reviewed the patient's other test results and agree with the interpretation    Results from last 7 days   Lab Units 04/24/24  0922 04/20/24  0528 04/19/24  0323   WBC 10*3/mm3 39.14* 10.87* 10.31   HEMOGLOBIN g/dL 9.9* 8.0* 7.6*   HEMATOCRIT % 30.9* 24.8* 23.4*   PLATELETS 10*3/mm3 384 280 219     Results from last 7 days   Lab Units 04/24/24  0922   SODIUM mmol/L 140   POTASSIUM mmol/L 3.8   CHLORIDE mmol/L 108*   CO2 mmol/L 16.0*   BUN mg/dL 24*   CREATININE mg/dL 3.25*   GLUCOSE mg/dL 115*   CALCIUM mg/dL 9.0     Results from last 7 days   Lab Units 04/24/24  0922   ALK PHOS U/L 90   BILIRUBIN mg/dL 0.3   ALT (SGPT) U/L 18   AST (SGOT) U/L 25                 Results from last 7 days   Lab Units 04/24/24  0922   LACTATE mmol/L 1.6     Estimated Creatinine Clearance: 22.1 mL/min (A) (by C-G formula based on SCr of 3.25 mg/dL (H)).  CPK          4/13/2024    10:43   Common Labs   Creatine Kinase 33       Procalitonin Results:      Lab 04/24/24  0922   PROCALCITONIN 0.48*      Brief Urine Lab Results  (Last result in the past 365 days)        Color   Clarity   Blood   Leuk Est   Nitrite   Protein   CREAT   Urine HCG        04/24/24 1016 Yellow   Clear   Moderate (2+)   Small (1+)   Negative   100 mg/dL (2+)                  No results found for: \"SITE\", \"ALLENTEST\", \"PHART\", " "\"BGY1NXC\", \"PO2ART\", \"ZEQ1ICD\", \"BASEEXCESS\", \"M6QEOVPR\", \"HGBBG\", \"HCTABG\", \"OXYHEMOGLOBI\", \"METHHGBN\", \"CARBOXYHGB\", \"CO2CT\", \"BAROMETRIC\", \"MODALITY\", \"FIO2\"     Microbiology:  Urine culture: In progress    Blood culture ×2: In progress      Radiology:  Imaging Results (Last 72 Hours)       Procedure Component Value Units Date/Time    CT Abdomen Pelvis Without Contrast [[id number]] Collected: 04/24/24 1242     Updated: 04/24/24 1250    Narrative:      CT ABDOMEN PELVIS WO CONTRAST    Date of Exam: 4/24/2024 12:13 PM EDT    Indication: Sepsis, source unclear.    Comparison: 4/12/2024    Technique: Axial CT images were obtained of the abdomen and pelvis without the administration of contrast. Reconstructed coronal and sagittal images were also obtained. Automated exposure control and iterative construction methods were used.      Findings:  Included lung bases are unchanged in appearance from prior CT. No new abnormality.    No suspicious hepatic lesion. Gallbladder is unremarkable. No significant biliary ductal dilation. The spleen, pancreas, and bilateral adrenal glands are unchanged.    There is significantly decreased perinephric fat stranding about the bilateral kidneys. There is no evidence of hydronephrosis or nephrolithiasis. No suspicious renal lesion.    Small hiatal hernia. No bowel obstruction. Normal appendix. A few scattered colonic diverticuli without evidence of diverticulitis.    No suspicious lymphadenopathy. Dense atherosclerotic calcifications of the aorta and branch vessels.    Decreased urinary bladder wall thickening which may be in part secondary to increased distention. Prostatomegaly.    Abdominal and pelvic wall soft tissues show no acute abnormality. There is no acute fracture or suspicious osseous lesion.      Impression:      Impression:  No new discrete abnormality of the abdomen or pelvis as compared to prior CT. Significantly decreased perinephric fat stranding about the bilateral " kidneys. Decreased urinary bladder wall thickening which may be in part secondary to increased distention   compared to prior exam.            Electronically Signed: Raulito Light MD    4/24/2024 12:47 PM EDT    Workstation ID: [id number]    CT Chest Without Contrast Diagnostic [[id number]] Collected: 04/24/24 1231     Updated: 04/24/24 1240    Narrative:      CT CHEST WO CONTRAST DIAGNOSTIC    Date of Exam: 4/24/2024 12:13 PM EDT    Indication: Cough, sepsis, unclear source.    Comparison: CT chest 4/12/2024    Technique: Axial CT images were obtained of the chest without contrast administration.  Reconstructed coronal and sagittal images were also obtained. Automated exposure control and iterative construction methods were used.      Findings:  MEDIASTINUM: The heart size is stable. Trace pericardial fluid. Right chest wall AICD. Left chest wall infusion port with tip in the SVC.  CORONARY ARTERIES: There is calcified atherosclerotic disease.  LUNGS: There are postsurgical changes of the right lower lung with inferomedial scarring posteriorly. There is a loculated right pleural effusion tracking into the fissure, similar to the previous study. There is diffuse emphysema with biapical scarring.  LYMPH NODES: Mildly prominent mediastinal lymph nodes are unchanged. There are small calcified subcarinal and left hilar nodes.    OSSEOUS STRUCTURES: Multilevel thoracic degenerative disc disease. Stable healing right-sided rib fractures. No acute osseous abnormality.      Impression:      Impression:    1. Stable postsurgical changes of the right lower lung with scarring and chronic appearing loculated right pleural effusion.  2. Mild emphysema and biapical scarring.        Electronically Signed: Aimee Freitas MD    4/24/2024 12:37 PM EDT    Workstation ID: [id number]    XR Chest 1 View [[id number]] Collected: 04/24/24 1035     Updated: 04/24/24 1040    Narrative:      XR CHEST 1 VW    Date of Exam: 4/24/2024 10:15 AM  EDT    Indication: Sepsis, cough    Comparison: 4/12/2024    Findings:  Cardiomediastinal silhouette is stable. There are postsurgical changes of the right lung with a small chronic effusion. Left lung is clear. Left-sided port catheter with the tip in the SVC. Right AICD. No acute osseous abnormality identified.      Impression:      Impression:  Stable chronic appearance of the chest. No acute cardiopulmonary abnormality.      Electronically Signed: Aimee Freitas MD    4/24/2024 10:37 AM EDT    Workstation ID: [id number]        I independently read the patient's radiographs    IMPRESSION:     Problems:  Severe leukocytosis with neutrophilia-Unclear etiology.  No diarrhea.  Could be having recurrent urinary infection/pyelonephritis although fat stranding around his kidneys appears improved on repeat CT abdomen and pelvis.  He does have a Mediport and a AICD in place.  He has a chronic appearing right-sided loculated pleural effusion at the area of scarring from his prior surgery.  Blood cultures are in progress.  We will continue to monitor on broad-spectrum antimicrobial coverage.  Given he responded cefepime last time, I will switch his Zosyn to cefepime.  I will additionally switch him off of vancomycin given his recent renal failure and switch him to daptomycin for now. Worsening  Nausea and vomiting  Diarrhea-C. Difficile test was negative  Recent bilateral pyelonephritis  Pyuria/possible UTI  Recent acute renal failure-improving.  Creatinine has trended down to 3.25, from 4.19 at time of discharge on 4/22.  NSCLC/RLL lobectomy (1/2024)  Normocytic anemia-Improved from time of discharge  Emphysema    RECOMMENDATIONS:    -Continue to follow CBC and CMP closely. Check baseline CPK level  -Continue to monitor urine culture and blood cultures closely  -Add GI PCR panel to prior stool specimen that was collected  -TTE in order to help rule out infective endocarditis/lead infection especially given his AICD in  place.  -IV fluids and antiemetics prn per primary team  -Stop Zosyn and vancomycin  -Start cefepime 2 g IV every 24 hours  -Start daptomycin 8 mg/kg IV every 48 hours  -Could consider Karius test if etiology of recurrent sepsis remains unclear especially if no improvement    I discussed in length with the patient and his daughter at bedside today    I discussed the patient's case with Dr. Mitchell yesterday evening    Thank you for asking me to see David Barfield.  Our group would be pleased to follow this patient over the course of their hospitalization and assist with outpatient antimicrobial therapy, as indicated.  Further recommendations depend on the results of the cultures and clinical course.    Complex MDM    Derian Barry MD  4/25/2024

## 2024-04-25 NOTE — THERAPY EVALUATION
Patient Name: David Barfield  : 1950    MRN: [id number]                              Today's Date: 2024       Admit Date: 2024    Visit Dx:     ICD-10-CM ICD-9-CM   1. Hypotension, unspecified hypotension type  I95.9 458.9   2. Sepsis without acute organ dysfunction, due to unspecified organism  A41.9 038.9     995.91   3. Leukocytosis, unspecified type  D72.829 288.60     Patient Active Problem List   Diagnosis    High degree atrioventricular block    Bradycardia, sinus    Chronic hypotension    PVC's (premature ventricular contractions)    Presence of cardiac pacemaker    Mixed hyperlipidemia    Centrilobular emphysema    Nodule of lower lobe of right lung    Mediastinal adenopathy    Cough    Tobacco abuse    Bronchogenic cancer of right lung    Malignant neoplasm of lower lobe of right lung    Primary hypertension    GERD without esophagitis    Sepsis    Acute pyelonephritis    ARACELI (acute kidney injury)    Hypokalemia    Severe malnutrition    Leukocytosis     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    Diabetes mellitus Borderline    Emphysema/COPD     Erectile disorder     GERD (gastroesophageal reflux disease)     History of chemotherapy     Hyperlipidemia     Hypertension 2019    Lung nodule     Mumps     Mumps     Pruritus     after bath    Slow to wake up after anesthesia     Wears dentures     upper only    Wears hearing aid in both ears     usually only wears right     Past Surgical History:   Procedure Laterality Date    BONE BIOPSY      broken bone surgery in his face    BRONCHOSCOPY THORACOTOMY Right 2024    Procedure: THORACOTOMY FOR LOWER LOBECTOMY AND MEDISTINAL LYMPH NODE DISSECTION RIGHT;  Surgeon: Joey Patel MD;  Location: On license of [facility];  Service: Cardiothoracic;  Laterality: Right;    BRONCHOSCOPY WITH ION ROBOTIC ASSIST N/A 09/15/2023    Procedure:  BRONCHOSCOPY NAVIGATION WITH ENDOBRONCHIAL ULTRASOUND AND ION ROBOT;  Surgeon: Octaviano Sampson MD;  Location:  [facility];  Service: Robotics - Pulmonary;  Laterality: N/A;  ion #6 - 0032  - 0015  Cath guide 0061    EBUS balloon removed and intact    CARDIAC ELECTROPHYSIOLOGY PROCEDURE N/A 08/17/2021    Procedure: Pacemaker DC new;  Surgeon: Kayy Box MD;  Location:  [facility];  Service: Cardiology;  Laterality: N/A;    FACIAL FRACTURE SURGERY      PACEMAKER IMPLANTATION        General Information       Row Name 04/25/24 0735          OT Time and Intention    Document Type discharge evaluation/summary  -AC     Mode of Treatment occupational therapy  -AC       Row Name 04/25/24 0735          General Information    Patient Profile Reviewed yes  -AC     Prior Level of Function independent:;all household mobility;community mobility;ADL's;driving  no AD to ambulate  -AC     Existing Precautions/Restrictions --  monitor BP  -AC     Barriers to Rehab medically complex  -AC       Row Name 04/25/24 0735          Occupational Profile    Environmental Supports and Barriers (Occupational Profile) walk in shower with shower seat  -AC       Row Name 04/25/24 0735          Living Environment    People in Home significant other  -AC       Row Name 04/25/24 0735          Home Main Entrance    Number of Stairs, Main Entrance one  -AC       Row Name 04/25/24 0735          Stairs Within Home, Primary    Stairs, Within Home, Primary 1 story  -AC       Row Name 04/25/24 0735          Cognition    Orientation Status (Cognition) oriented x 4  -AC       Row Name 04/25/24 0735          Safety Issues, Functional Mobility    Impairments Affecting Function (Mobility) endurance/activity tolerance  -AC               User Key  (r) = Recorded By, (t) = Taken By, (c) = Cosigned By      Initials Name Provider Type    AC Gely Stahl OT Occupational Therapist                     Mobility/ADL's       Row  Name 04/25/24 0838          Bed Mobility    Bed Mobility supine-sit;sit-supine  -     Supine-Sit [place] (Bed Mobility) modified independence  -     Sit-Supine [place] (Bed Mobility) modified independence  -     Assistive Device (Bed Mobility) bed rails;head of bed elevated  -       Row Name 04/25/24 0838          Transfers    Transfers sit-stand transfer  -       Row Name 04/25/24 0838          Sit-Stand Transfer    Sit-Stand [place] (Transfers) independent  -       Row Name 04/25/24 0838          Functional Mobility    Functional Mobility- Ind. Level supervision required  -     Functional Mobility-Distance (Feet) --  in room  -       Row Name 04/25/24 0838          Activities of Daily Living    BADL Assessment/Intervention lower body dressing;grooming  -       Row Name 04/25/24 0838          Lower Body Dressing Assessment/Training    [place] Level (Lower Body Dressing) don;socks;independent  -     Position (Lower Body Dressing) edge of bed sitting  -       Row Name 04/25/24 0838          Grooming Assessment/Training    [place] Level (Grooming) hair care, combing/brushing;wash face, hands;independent  -AC     Position (Grooming) edge of bed sitting  -               User Key  (r) = Recorded By, (t) = Taken By, (c) = Cosigned By      Initials Name Provider Type     Gely Stahl, OT Occupational Therapist                   Obj/Interventions       Row Name 04/25/24 0839          Sensory Assessment (Somatosensory)    Sensory Assessment (Somatosensory) UE sensation intact  -       Row Name 04/25/24 0839          Vision Assessment/Intervention    Visual Impairment/Limitations WFL  -       Row Name 04/25/24 0839          Range of Motion Comprehensive    General Range of Motion bilateral upper extremity ROM WNL  -       Row Name 04/25/24 0839          Strength Comprehensive (MMT)    Comment, General Manual Muscle Testing (MMT) Assessment BUE grossly 4/5  -                User Key  (r) = Recorded By, (t) = Taken By, (c) = Cosigned By      Initials Name Provider Type    AC Gely Stahl, OT Occupational Therapist                   Goals/Plan    No documentation.                  Clinical Impression       Row Name 04/25/24 0839          Pain Assessment    Pretreatment Pain Rating 0/10 - no pain  -     Posttreatment Pain Rating 0/10 - no pain  -[facility] Name 04/25/24 0839          Plan of Care Review    Plan of Care Reviewed With patient  -AC     Outcome Evaluation Pt demo ind with grooming,  ind LBD, supervision to ambulate in room without AD.  Pt with drop in BP with activity 83/58 - asymptomatic.   Pt does not demo any deficits which would require OT intervention at this time.  Recommend home with assist upon d/c.  -       Row Name 04/25/24 0839          Therapy Assessment/Plan (OT)    Criteria for Skilled Therapeutic Interventions Met (OT) no;no problems identified which require skilled intervention  -     Therapy Frequency (OT) evaluation only  -AC       Row Name 04/25/24 0839          Therapy Plan Review/Discharge Plan (OT)    Anticipated Discharge Disposition (OT) home with assist  -AC       Row Name 04/25/24 0839          Vital Signs    Pre Systolic BP Rehab 99  -AC     Pre Treatment Diastolic BP 49  -AC     Post Systolic BP Rehab 83  -AC     Post Treatment Diastolic BP 58  -AC     Pretreatment Heart Rate (beats/min) 83  -AC     Posttreatment Heart Rate (beats/min) 70  -AC     Post SpO2 (%) 98  -AC     O2 Delivery Post Treatment room air  -AC     Pre Patient Position Supine  -AC     Post Patient Position Supine  -AC       Row Name 04/25/24 0839          Positioning and Restraints    Pre-Treatment Position in bed  -AC     Post Treatment Position bed  -AC     In Bed notified nsg;chad[place]ichael;call light within reach;encouraged to call for assist;exit alarm on  -AC               User Key  (r) = Recorded By, (t) = Taken By, (c) = Cosigned By      Initials Name Provider  Type    Gely Marsh, OT Occupational Therapist                   Outcome Measures       Row Name 04/25/24 0843          How much help from another is currently needed...    Putting on and taking off regular lower body clothing? 4  -AC     Bathing (including washing, rinsing, and drying) 3  supervision d/t BP  -AC     Toileting (which includes using toilet bed pan or urinal) 4  -AC     Putting on and taking off regular upper body clothing 4  -AC     Taking care of personal grooming (such as brushing teeth) 4  -AC     Eating meals 4  -AC     AM-PAC 6 Clicks Score (OT) 23  -AC       Row Name 04/25/24 0843          Functional Assessment    Outcome Measure Options AM-PAC 6 Clicks Daily Activity (OT)  -AC               User Key  (r) = Recorded By, (t) = Taken By, (c) = Cosigned By      Initials Name Provider Type    Gely Marsh, GLENDY Occupational Therapist                    Occupational Therapy Education       Title: PT OT SLP Therapies (In Progress)       Topic: Occupational Therapy (In Progress)       Point: ADL training (Done)       Description:   Instruct learner(s) on proper safety adaptation and remediation techniques during self care or transfers.   Instruct in proper use of assistive devices.                  Learning Progress Summary             Patient Acceptance, E, VU by  at 4/25/2024 0843   Significant Other Acceptance, E, VU by  at 4/25/2024 0843                         Point: Home exercise program (Not Started)       Description:   Instruct learner(s) on appropriate technique for monitoring, assisting and/or progressing therapeutic exercises/activities.                  Learner Progress:  Not documented in this visit.              Point: Precautions (Not Started)       Description:   Instruct learner(s) on prescribed precautions during self-care and functional transfers.                  Learner Progress:  Not documented in this visit.              Point: Body mechanics (Not Started)        Description:   Instruct learner(s) on proper positioning and spine alignment during self-care, functional mobility activities and/or exercises.                  Learner Progress:  Not documented in this visit.                              User Key       Initials Effective Dates Name Provider Type Discipline    AC 02/03/23 -  Gely Stahl, OT Occupational Therapist OT                  OT Recommendation and Plan  Therapy Frequency (OT): evaluation only  Plan of Care Review  Plan of Care Reviewed With: patient  Outcome Evaluation: Pt demo ind with grooming,  ind LBD, supervision to ambulate in room without AD.  Pt with drop in BP with activity 83/58 - asymptomatic.   Pt does not demo any deficits which would require OT intervention at this time.  Recommend home with assist upon d/c.     Time Calculation:   Evaluation Complexity (OT)  Review Occupational Profile/Medical/Therapy History Complexity: brief/low complexity  Assessment, Occupational Performance/Identification of Deficit Complexity: 1-3 performance deficits  Clinical Decision Making Complexity (OT): problem focused assessment/low complexity  Overall Complexity of Evaluation (OT): low complexity     Time Calculation- OT       Row Name 04/25/24 0735             Time Calculation- OT    OT Start Time 0735  -AC      OT Received On 04/25/24  -AC      OT Goal Re-Cert Due Date 05/05/24  -AC         Untimed Charges    OT Eval/Re-eval Minutes 40  -AC         Total Minutes    Untimed Charges Total Minutes 40  -AC       Total Minutes 40  -AC                User Key  (r) = Recorded By, (t) = Taken By, (c) = Cosigned By      Initials Name Provider Type    AC Gely Stahl OT Occupational Therapist                  Therapy Charges for Today       Code Description Service Date Service Provider Modifiers Qty    25411801014 HC OT EVAL LOW COMPLEXITY 3 4/25/2024 Gely Stahl OT GO 1                 Gely Stahl OT  4/25/2024

## 2024-04-25 NOTE — CASE MANAGEMENT/SOCIAL WORK
Continued Stay Note  [facility]     Patient Name: David Barfield  MRN: [id number]  Today's Date: 4/25/2024    Admit Date: 4/24/2024    Plan: home vs home with home health   Discharge Plan       Row Name 04/25/24 1425       Plan    Plan home vs home with home health    Patient/Family in Agreement with Plan yes    Plan Comments Met with Mr. Barfield at the bedside to discuss discharge plan. His plan is home at this time. PT recommended home health and OT recommended home with assist.  will follow up with patient tomorrow to offer the option of home health at discharge.  will continue to follow plan of care and assist with discharge planning needs as indicated.    Final Discharge Disposition Code 01 - home or self-care                   Discharge Codes    No documentation.                       Kaylen Godoy RN

## 2024-04-26 LAB
ABO GROUP BLD: NORMAL
ADV 40+41 DNA STL QL NAA+NON-PROBE: NOT DETECTED
ANION GAP SERPL CALCULATED.3IONS-SCNC: 14 MMOL/L (ref 5–15)
ASTRO TYP 1-8 RNA STL QL NAA+NON-PROBE: NOT DETECTED
BASOPHILS # BLD AUTO: 0.05 10*3/MM3 (ref 0–0.2)
BASOPHILS NFR BLD AUTO: 0.2 % (ref 0–1.5)
BLD GP AB SCN SERPL QL: NEGATIVE
BUN SERPL-MCNC: 21 MG/DL (ref 8–23)
BUN/CREAT SERPL: 7.4 (ref 7–25)
C CAYETANENSIS DNA STL QL NAA+NON-PROBE: NOT DETECTED
C COLI+JEJ+UPSA DNA STL QL NAA+NON-PROBE: NOT DETECTED
CALCIUM SPEC-SCNC: 7.8 MG/DL (ref 8.6–10.5)
CHLORIDE SERPL-SCNC: 110 MMOL/L (ref 98–107)
CO2 SERPL-SCNC: 15 MMOL/L (ref 22–29)
CORTIS SERPL-MCNC: 8.01 MCG/DL
CREAT SERPL-MCNC: 2.85 MG/DL (ref 0.76–1.27)
CRYPTOSP DNA STL QL NAA+NON-PROBE: NOT DETECTED
DEPRECATED RDW RBC AUTO: 58.9 FL (ref 37–54)
E HISTOLYT DNA STL QL NAA+NON-PROBE: NOT DETECTED
EAEC PAA PLAS AGGR+AATA ST NAA+NON-PRB: NOT DETECTED
EC STX1+STX2 GENES STL QL NAA+NON-PROBE: NOT DETECTED
EGFRCR SERPLBLD CKD-EPI 2021: 22.3 ML/MIN/1.73
EOSINOPHIL # BLD AUTO: 0.27 10*3/MM3 (ref 0–0.4)
EOSINOPHIL NFR BLD AUTO: 1.1 % (ref 0.3–6.2)
EPEC EAE GENE STL QL NAA+NON-PROBE: NOT DETECTED
ERYTHROCYTE [DISTWIDTH] IN BLOOD BY AUTOMATED COUNT: 16.2 % (ref 12.3–15.4)
ETEC LTA+ST1A+ST1B TOX ST NAA+NON-PROBE: NOT DETECTED
G LAMBLIA DNA STL QL NAA+NON-PROBE: NOT DETECTED
GLUCOSE SERPL-MCNC: 95 MG/DL (ref 65–99)
HAPTOGLOB SERPL-MCNC: 321 MG/DL (ref 30–200)
HCT VFR BLD AUTO: 22.4 % (ref 37.5–51)
HCT VFR BLD AUTO: 27.5 % (ref 37.5–51)
HGB BLD-MCNC: 7 G/DL (ref 13–17.7)
HGB BLD-MCNC: 8.8 G/DL (ref 13–17.7)
IMM GRANULOCYTES # BLD AUTO: 0.56 10*3/MM3 (ref 0–0.05)
IMM GRANULOCYTES NFR BLD AUTO: 2.3 % (ref 0–0.5)
LDH SERPL-CCNC: 118 U/L (ref 135–225)
LH SERPL-ACNC: 12.5 MIU/ML
LYMPHOCYTES # BLD AUTO: 0.96 10*3/MM3 (ref 0.7–3.1)
LYMPHOCYTES NFR BLD AUTO: 3.9 % (ref 19.6–45.3)
MAGNESIUM SERPL-MCNC: 1.3 MG/DL (ref 1.6–2.4)
MCH RBC QN AUTO: 30.7 PG (ref 26.6–33)
MCHC RBC AUTO-ENTMCNC: 31.3 G/DL (ref 31.5–35.7)
MCV RBC AUTO: 98.2 FL (ref 79–97)
MONOCYTES # BLD AUTO: 1.54 10*3/MM3 (ref 0.1–0.9)
MONOCYTES NFR BLD AUTO: 6.2 % (ref 5–12)
NEUTROPHILS NFR BLD AUTO: 21.27 10*3/MM3 (ref 1.7–7)
NEUTROPHILS NFR BLD AUTO: 86.3 % (ref 42.7–76)
NOROVIRUS GI+II RNA STL QL NAA+NON-PROBE: NOT DETECTED
NRBC BLD AUTO-RTO: 0 /100 WBC (ref 0–0.2)
P SHIGELLOIDES DNA STL QL NAA+NON-PROBE: NOT DETECTED
PHOSPHATE SERPL-MCNC: 3 MG/DL (ref 2.5–4.5)
PLATELET # BLD AUTO: 265 10*3/MM3 (ref 140–450)
PMV BLD AUTO: 9.5 FL (ref 6–12)
POTASSIUM SERPL-SCNC: 3.1 MMOL/L (ref 3.5–5.2)
RBC # BLD AUTO: 2.28 10*6/MM3 (ref 4.14–5.8)
RETICS # AUTO: 0.03 10*6/MM3 (ref 0.02–0.13)
RETICS/RBC NFR AUTO: 1.4 % (ref 0.7–1.9)
RH BLD: NEGATIVE
RVA RNA STL QL NAA+NON-PROBE: NOT DETECTED
S ENT+BONG DNA STL QL NAA+NON-PROBE: NOT DETECTED
SAPO I+II+IV+V RNA STL QL NAA+NON-PROBE: NOT DETECTED
SHIGELLA SP+EIEC IPAH ST NAA+NON-PROBE: NOT DETECTED
SODIUM SERPL-SCNC: 139 MMOL/L (ref 136–145)
T&S EXPIRATION DATE: NORMAL
T4 FREE SERPL-MCNC: 0.93 NG/DL (ref 0.92–1.68)
TSH SERPL DL<=0.05 MIU/L-ACNC: 0.74 UIU/ML (ref 0.27–4.2)
V CHOL+PARA+VUL DNA STL QL NAA+NON-PROBE: NOT DETECTED
V CHOLERAE DNA STL QL NAA+NON-PROBE: NOT DETECTED
WBC NRBC COR # BLD AUTO: 24.65 10*3/MM3 (ref 3.4–10.8)
Y ENTEROCOL DNA STL QL NAA+NON-PROBE: NOT DETECTED

## 2024-04-26 PROCEDURE — 86900 BLOOD TYPING SEROLOGIC ABO: CPT | Performed by: INTERNAL MEDICINE

## 2024-04-26 PROCEDURE — 25010000002 HEPARIN (PORCINE) PER 1000 UNITS: Performed by: INTERNAL MEDICINE

## 2024-04-26 PROCEDURE — 83615 LACTATE (LD) (LDH) ENZYME: CPT | Performed by: INTERNAL MEDICINE

## 2024-04-26 PROCEDURE — P9016 RBC LEUKOCYTES REDUCED: HCPCS

## 2024-04-26 PROCEDURE — 85018 HEMOGLOBIN: CPT | Performed by: INTERNAL MEDICINE

## 2024-04-26 PROCEDURE — 86901 BLOOD TYPING SEROLOGIC RH(D): CPT | Performed by: INTERNAL MEDICINE

## 2024-04-26 PROCEDURE — 36430 TRANSFUSION BLD/BLD COMPNT: CPT

## 2024-04-26 PROCEDURE — 83735 ASSAY OF MAGNESIUM: CPT | Performed by: INTERNAL MEDICINE

## 2024-04-26 PROCEDURE — 25010000002 CEFEPIME PER 500 MG: Performed by: INTERNAL MEDICINE

## 2024-04-26 PROCEDURE — 84100 ASSAY OF PHOSPHORUS: CPT | Performed by: INTERNAL MEDICINE

## 2024-04-26 PROCEDURE — 25010000002 MAGNESIUM SULFATE 4 GM/100ML SOLUTION: Performed by: INTERNAL MEDICINE

## 2024-04-26 PROCEDURE — 94799 UNLISTED PULMONARY SVC/PX: CPT

## 2024-04-26 PROCEDURE — 0 DEXTROSE 5 % SOLUTION 1,000 ML FLEX CONT: Performed by: INTERNAL MEDICINE

## 2024-04-26 PROCEDURE — 84402 ASSAY OF FREE TESTOSTERONE: CPT | Performed by: INTERNAL MEDICINE

## 2024-04-26 PROCEDURE — 83010 ASSAY OF HAPTOGLOBIN QUANT: CPT | Performed by: INTERNAL MEDICINE

## 2024-04-26 PROCEDURE — 25010000002 POTASSIUM CHLORIDE PER 2 MEQ OF POTASSIUM: Performed by: INTERNAL MEDICINE

## 2024-04-26 PROCEDURE — 82533 TOTAL CORTISOL: CPT | Performed by: INTERNAL MEDICINE

## 2024-04-26 PROCEDURE — 94664 DEMO&/EVAL PT USE INHALER: CPT

## 2024-04-26 PROCEDURE — 85045 AUTOMATED RETICULOCYTE COUNT: CPT | Performed by: INTERNAL MEDICINE

## 2024-04-26 PROCEDURE — 82024 ASSAY OF ACTH: CPT | Performed by: INTERNAL MEDICINE

## 2024-04-26 PROCEDURE — 86900 BLOOD TYPING SEROLOGIC ABO: CPT

## 2024-04-26 PROCEDURE — 80048 BASIC METABOLIC PNL TOTAL CA: CPT | Performed by: INTERNAL MEDICINE

## 2024-04-26 PROCEDURE — 85014 HEMATOCRIT: CPT | Performed by: INTERNAL MEDICINE

## 2024-04-26 PROCEDURE — 84439 ASSAY OF FREE THYROXINE: CPT | Performed by: INTERNAL MEDICINE

## 2024-04-26 PROCEDURE — 86850 RBC ANTIBODY SCREEN: CPT | Performed by: INTERNAL MEDICINE

## 2024-04-26 PROCEDURE — 84443 ASSAY THYROID STIM HORMONE: CPT | Performed by: INTERNAL MEDICINE

## 2024-04-26 PROCEDURE — 0152U NFCT DS DNA UNTRGT NGNRJ SEQ: CPT | Performed by: INTERNAL MEDICINE

## 2024-04-26 PROCEDURE — 85025 COMPLETE CBC W/AUTO DIFF WBC: CPT | Performed by: INTERNAL MEDICINE

## 2024-04-26 PROCEDURE — 99232 SBSQ HOSP IP/OBS MODERATE 35: CPT | Performed by: INTERNAL MEDICINE

## 2024-04-26 PROCEDURE — 84403 ASSAY OF TOTAL TESTOSTERONE: CPT | Performed by: INTERNAL MEDICINE

## 2024-04-26 PROCEDURE — 25810000003 LACTATED RINGERS PER 1000 ML: Performed by: INTERNAL MEDICINE

## 2024-04-26 PROCEDURE — 83002 ASSAY OF GONADOTROPIN (LH): CPT | Performed by: INTERNAL MEDICINE

## 2024-04-26 PROCEDURE — 86923 COMPATIBILITY TEST ELECTRIC: CPT

## 2024-04-26 PROCEDURE — 99233 SBSQ HOSP IP/OBS HIGH 50: CPT | Performed by: INTERNAL MEDICINE

## 2024-04-26 RX ORDER — MAGNESIUM SULFATE HEPTAHYDRATE 40 MG/ML
4 INJECTION, SOLUTION INTRAVENOUS ONCE
Status: COMPLETED | OUTPATIENT
Start: 2024-04-26 | End: 2024-04-26

## 2024-04-26 RX ORDER — L.ACID,PARA/B.BIFIDUM/S.THERM 8B CELL
1 CAPSULE ORAL DAILY
Status: DISCONTINUED | OUTPATIENT
Start: 2024-04-26 | End: 2024-04-29 | Stop reason: HOSPADM

## 2024-04-26 RX ORDER — POTASSIUM CHLORIDE 750 MG/1
40 CAPSULE, EXTENDED RELEASE ORAL 2 TIMES DAILY WITH MEALS
Status: DISCONTINUED | OUTPATIENT
Start: 2024-04-26 | End: 2024-04-28

## 2024-04-26 RX ORDER — POTASSIUM CHLORIDE 20 MEQ/1
40 TABLET, EXTENDED RELEASE ORAL ONCE
Status: COMPLETED | OUTPATIENT
Start: 2024-04-26 | End: 2024-04-26

## 2024-04-26 RX ORDER — POTASSIUM CHLORIDE 20 MEQ/1
40 TABLET, EXTENDED RELEASE ORAL DAILY
Status: DISCONTINUED | OUTPATIENT
Start: 2024-04-26 | End: 2024-04-26

## 2024-04-26 RX ADMIN — MAGNESIUM SULFATE IN WATER FOR 4 G: 40 INJECTION INTRAVENOUS at 15:30

## 2024-04-26 RX ADMIN — MIDODRINE HYDROCHLORIDE 10 MG: 10 TABLET ORAL at 11:26

## 2024-04-26 RX ADMIN — BUDESONIDE AND FORMOTEROL FUMARATE DIHYDRATE 2 PUFF: 160; 4.5 AEROSOL RESPIRATORY (INHALATION) at 09:37

## 2024-04-26 RX ADMIN — FERROUS SULFATE TAB 325 MG (65 MG ELEMENTAL FE) 325 MG: 325 (65 FE) TAB at 07:58

## 2024-04-26 RX ADMIN — TIOTROPIUM BROMIDE INHALATION SPRAY 2 PUFF: 3.12 SPRAY, METERED RESPIRATORY (INHALATION) at 09:37

## 2024-04-26 RX ADMIN — MIDODRINE HYDROCHLORIDE 10 MG: 10 TABLET ORAL at 07:58

## 2024-04-26 RX ADMIN — HEPARIN SODIUM 5000 UNITS: 5000 INJECTION INTRAVENOUS; SUBCUTANEOUS at 08:04

## 2024-04-26 RX ADMIN — BUDESONIDE AND FORMOTEROL FUMARATE DIHYDRATE 2 PUFF: 160; 4.5 AEROSOL RESPIRATORY (INHALATION) at 20:35

## 2024-04-26 RX ADMIN — POTASSIUM CHLORIDE: 2 INJECTION, SOLUTION, CONCENTRATE INTRAVENOUS at 17:12

## 2024-04-26 RX ADMIN — POTASSIUM CHLORIDE: 2 INJECTION, SOLUTION, CONCENTRATE INTRAVENOUS at 02:00

## 2024-04-26 RX ADMIN — Medication 10 ML: at 08:04

## 2024-04-26 RX ADMIN — POTASSIUM CHLORIDE 40 MEQ: 750 CAPSULE, EXTENDED RELEASE ORAL at 21:15

## 2024-04-26 RX ADMIN — FLUTICASONE PROPIONATE 2 SPRAY: 50 SPRAY, METERED NASAL at 08:04

## 2024-04-26 RX ADMIN — CEFEPIME 2000 MG: 2 INJECTION, POWDER, FOR SOLUTION INTRAVENOUS at 21:14

## 2024-04-26 RX ADMIN — SODIUM BICARBONATE 650 MG TABLET 650 MG: at 08:04

## 2024-04-26 RX ADMIN — Medication 10 ML: at 21:15

## 2024-04-26 RX ADMIN — Medication 1 CAPSULE: at 15:29

## 2024-04-26 RX ADMIN — PRAVASTATIN SODIUM 80 MG: 40 TABLET ORAL at 21:16

## 2024-04-26 RX ADMIN — SODIUM BICARBONATE 150 MEQ: 84 INJECTION, SOLUTION INTRAVENOUS at 22:12

## 2024-04-26 RX ADMIN — POTASSIUM CHLORIDE 40 MEQ: 1500 TABLET, EXTENDED RELEASE ORAL at 15:29

## 2024-04-26 RX ADMIN — PANTOPRAZOLE SODIUM 40 MG: 40 TABLET, DELAYED RELEASE ORAL at 06:19

## 2024-04-26 RX ADMIN — ACETAMINOPHEN 650 MG: 325 TABLET ORAL at 12:23

## 2024-04-26 NOTE — PROGRESS NOTES
"HEMATOLOGY/ONCOLOGY PROGRESS NOTE    S: He is feeling well today. Having some perineal discomfort and ID is considering prostatitis as a source. Alfredo is also pending. He is about to receive a blood transfusion. He denies any specific complaints to me.     His significant other and son are at bedside.   Medications:  The current medication list was reviewed in the EMR    ALLERGIES:    Allergies   Allergen Reactions    Cymbalta [Duloxetine Hcl] GI Intolerance    Gabapentin Mental Status Change     Pt states that this medication \"makes him feel foolish in his head\".     Remeron [Mirtazapine] Other (See Comments)     Excess sedation    Toradol [Ketorolac Tromethamine] GI Intolerance     Projectile vomiting     Latex Other (See Comments)     Latex allergy     Tape Rash         Physical Exam    VITAL SIGNS:  /74 (BP Location: Left arm, Patient Position: Lying)   Pulse 72   Temp 97.2 °F (36.2 °C) (Oral)   Resp 18   Ht 182.9 cm (72\")   Wt 79.6 kg (175 lb 8 oz)   SpO2 96%   BMI 23.80 kg/m²   Temp:  [97.2 °F (36.2 °C)-99.7 °F (37.6 °C)] 97.2 °F (36.2 °C)      Performance Status:                Physical Exam  Constitutional:  Well developed, Well nourished, No acute distress.    HENT:  Normocephalic, Atraumatic.  Eyes: Conjunctivae normal.  Sclerae anicteric  Musculoskeletal: No peripheral edema.  Skin:  Warm, Dry, No erythema, No rash.   Psych: normal affect  Neuro: A and O x 3         RECENT LABS:    Lab Results   Component Value Date    HGB 7.0 (L) 04/26/2024    HCT 22.4 (L) 04/26/2024    MCV 98.2 (H) 04/26/2024     04/26/2024    WBC 24.65 (H) 04/26/2024    NEUTROABS 21.27 (H) 04/26/2024    LYMPHSABS 0.96 04/26/2024    MONOSABS 1.54 (H) 04/26/2024    EOSABS 0.27 04/26/2024    BASOSABS 0.05 04/26/2024       Lab Results   Component Value Date    GLUCOSE 95 04/26/2024    BUN 21 04/26/2024    CREATININE 2.85 (H) 04/26/2024     04/26/2024    K 3.1 (L) 04/26/2024     (H) 04/26/2024    CO2 15.0 " (L) 04/26/2024    CALCIUM 7.8 (L) 04/26/2024    PROTEINTOT 5.8 (L) 04/25/2024    ALBUMIN 2.8 (L) 04/25/2024    BILITOT 0.3 04/25/2024    ALKPHOS 76 04/25/2024    AST 12 04/25/2024    ALT 10 04/25/2024         Assessment/Plan  Severe leukocytosis  -I reviewed the patient's extensive recent course including discharge summary, nephrology notes, infectious disease notes, and multiple serial culture results, blood counts, and CMP's. The patient was recently admitted with ARACELI in the context of bilateral pyelonephrosis and clinically improved with IV antibiotics.  He had a marked leukocytosis that admission with resolution of leukocytosis with IV antibiotics.  Specifically, his white blood cell count was normal on 3/27/2024, increased to 12.6 on 4/4/2024 and was elevated at 36 on 4/12 at the time of his original admission.  It peaked at 46,000 with an absolute neutrophilic predominance. He has no peripheral blood eosinophilia or lymphocytosis. His white blood cell count downtrended quickly and had normalized for the last 3 days of his hospitalization with a white blood cell count of 10.  On presentation this admission his white blood cell count is 45 with neutrophilic predominance and improved to 24 today with antibiotics suggestive of infectious etiology. Differential remains neutrophillic. Karius digital culture pending. Discussed with ID attending. Broad spectrum antibiotics are being continued pending organism identification.  He does have an indwelling port.     2. Acute on chronic anemia  -He has a mild persistent anemia ranging from 10.5-11.2 prior to his acute hospital stay.  Last admission his anemia acutely worsened to a javan hemoglobin of 7.6.  It is 7.0 today. Transfusion planned.      Although his anemia had previously failed to completely resolved following chemotherapy, surgery, and pneumonia, noninvasive workup was relatively unremarkable in March and at that time patient had elected for serial observation  of her bone marrow biopsy.  I think his acute recent worsening of anemia is most likely secondary to bone marrow suppression and renal failure.  Suppressed reticulocyte count, LDH and normal haptoglobin suggest no hemolysis.     Recurrent hypotension  4.  Non-small cell lung carcinoma on recent immunotherapy  -Further immunotherapy on hold.  So far no convincing evidence of immunotherapy induced adverse event to explain his symptoms.  I would recommend screening for endocrinopathies but reactive favored in context of improvement with antibiotics and leukocytosis.  -Normal TSH 1.15 on 4/12/2024.  -Check free T4, cortisol, ACTH, LH, testosterone: pending this AM  -CK was low     4.  ARACELI in the context of bilateral pyelonephritis, now resolving  -His baseline creatinine was normal through 3/27/2024.  He resumed immunotherapy with Opdivo on 4/4/2024 and at that time his creatinine was 2.  Retrospectively, he reports that he had had an acute diarrheal illness with low-grade temperatures the weekend prior, but did not report the symptoms prior to his Opdivo.  He reports his symptoms have resolved but he had not fully recovered his p.o. intake.  His creatinine was improved by the time of follow-up on 410 but remained elevated above his baseline at 1.5.  On presentation on 412 his creatinine was 2.2 and peaked at 8.3 x 417.  It had improved to 4.1 at the time of discharge and has continued to improve this admission, now 3.2.  -There are rare cases of immunotherapy induced acute renal failure, but the patient clinically improved with antibiotics, had no urine eosinophils, and nephrology did not pursue renal biopsy.  Given that he is clinically improving without steroids this seems unlikely.  -Cr continues to improve.     I will follow up with the patient on Tuesday if he remains inpatient.    Neetu Ashley MD  [facility]ematology [facility] Oncology    4/26/2024

## 2024-04-26 NOTE — PROGRESS NOTES
[facility] Medicine Services  PROGRESS NOTE    Patient Name: David Barfield  : 1950  MRN: [id number]    Date of Admission: 2024  Primary Care Physician: Shahid Drake MD    Subjective   Subjective     CC:  N/V, weakness    HPI:  Resting in bed no acute distress and tells me that he feels better.  He does not have any specific complaint today.  Patient's wife is also present at the bedside.  Denies any fever or chills.  No chest pain or palpitation or shortness of breath at rest.  No nausea or vomiting currently.      Objective   Objective     Vital Signs:   Temp:  [97.2 °F (36.2 °C)-99.7 °F (37.6 °C)] 98.4 °F (36.9 °C)  Heart Rate:  [70-83] 77  Resp:  [16-18] 18  BP: (102-130)/(56-74) 118/70     Physical Exam:  Constitutional: No acute distress, awake, alert  HENT: NCAT, mucous membranes moist  Respiratory: Clear to auscultation bilaterally, respiratory effort normal   Cardiovascular: RRR, no murmurs, rubs, or gallops  Gastrointestinal: Positive bowel sounds, soft, nontender, nondistended  Musculoskeletal: No bilateral ankle edema  Psychiatric: Appropriate affect, cooperative  Neurologic: Oriented x 3, strength symmetric in all extremities, Cranial Nerves grossly intact to confrontation, speech clear  Skin: No rashes     Results Reviewed:  LAB RESULTS:      Lab 24  0407 24  0727 24  0922 24  0528   WBC 24.65* 45.90* 39.14* 10.87*   HEMOGLOBIN 7.0* 7.8* 9.9* 8.0*   HEMATOCRIT 22.4* 24.4* 30.9* 24.8*   PLATELETS 265 287 384 280   NEUTROS ABS 21.27* 41.95* 35.71* 7.69*   IMMATURE GRANS (ABS) 0.56* 0.77* 0.38* 0.12*   LYMPHS ABS 0.96 1.22 0.89 1.33   MONOS ABS 1.54* 1.83* 2.01* 1.41*   EOS ABS 0.27 0.03 0.04 0.28   MCV 98.2* 97.2* 96.6 94.3   PROCALCITONIN  --   --  0.48*  --    LACTATE  --   --  1.6  --    *  --   --   --          Lab 24  0407 24  2114 24  0727 24  0922 24  0715 24  0403 24  0528    SODIUM 139 137 139 140 142 141 140   POTASSIUM 3.1* 3.3* 3.2* 3.8 4.0 4.0 4.1   CHLORIDE 110* 110* 110* 108* 108* 109* 107   CO2 15.0* 15.0* 15.0* 16.0* 19.0* 17.0* 19.0*   ANION GAP 14.0 12.0 14.0 16.0* 15.0 15.0 14.0   BUN 21 22 22 24* 26* 32* 38*   CREATININE 2.85* 2.88* 3.22* 3.25* 4.19* 5.22* 6.41*   EGFR 22.3* 22.1* 19.3* 19.1* 14.1* 10.8* 8.4*   GLUCOSE 95 97 93 115* 101* 93 97   CALCIUM 7.8* 7.8* 7.5* 9.0 8.7 8.5* 8.3*   MAGNESIUM 1.3*  --  1.1*  --   --   --   --    PHOSPHORUS 3.0  --   --   --  4.5 4.8* 5.2*   TSH 0.736  --   --   --   --   --   --          Lab 04/25/24  0727 04/24/24  0922 04/22/24  0715 04/21/24  0403 04/20/24  0528   TOTAL PROTEIN 5.8* 8.5  --   --   --    ALBUMIN 2.8* 3.5 3.3* 2.9* 2.9*   GLOBULIN 3.0 5.0  --   --   --    ALT (SGPT) 10 18  --   --   --    AST (SGOT) 12 25  --   --   --    BILIRUBIN 0.3 0.3  --   --   --    ALK PHOS 76 90  --   --   --    LIPASE  --  23  --   --   --          Lab 04/24/24  1125 04/24/24  0922   HSTROP T 26* 29*             Lab 04/26/24  0852   ABO TYPING A   RH TYPING Negative   ANTIBODY SCREEN Negative         Lab 04/25/24  0908   PH, ARTERIAL 7.362   PCO2, ARTERIAL 25.1*   PO2 .0*   FIO2 21   HCO3 ART 14.2*   BASE EXCESS ART -10.1*   CARBOXYHEMOGLOBIN 1.6     Brief Urine Lab Results  (Last result in the past 365 days)        Color   Clarity   Blood   Leuk Est   Nitrite   Protein   CREAT   Urine HCG        04/24/24 1016 Yellow   Clear   Moderate (2+)   Small (1+)   Negative   100 mg/dL (2+)                   Microbiology Results Abnormal       Procedure Component Value - Date/Time    Blood Culture - Blood, Arm, Right [[id number]]  (Normal) Collected: 04/24/24 1049    Lab Status: Preliminary result Specimen: Blood from Arm, Right Updated: 04/26/24 1130     Blood Culture No growth at 2 days    Narrative:      Less than seven (7) mL's of blood was collected.  Insufficient quantity may yield false negative results.    Blood Culture - Blood, Arm,  Left [[id number]]  (Normal) Collected: 04/24/24 1100    Lab Status: Preliminary result Specimen: Blood from Arm, Left Updated: 04/26/24 1130     Blood Culture No growth at 2 days    Narrative:      Less than seven (7) mL's of blood was collected.  Insufficient quantity may yield false negative results.    Gastrointestinal Panel, PCR - Stool, Per Rectum [[id number]]  (Normal) Collected: 04/25/24 2026    Lab Status: Final result Specimen: Stool from Per Rectum Updated: 04/26/24 0742     Campylobacter Not Detected     Plesiomonas shigelloides Not Detected     Salmonella Not Detected     Vibrio Not Detected     Vibrio cholerae Not Detected     Yersinia enterocolitica Not Detected     Enteroaggregative E. coli (EAEC) Not Detected     Enteropathogenic E. coli (EPEC) Not Detected     Enterotoxigenic E. coli (ETEC) lt/st Not Detected     Shiga-like toxin-producing E. coli (STEC) stx1/stx2 Not Detected     Shigella/Enteroinvasive E. coli (EIEC) Not Detected     Cryptosporidium Not Detected     Cyclospora cayetanensis Not Detected     Entamoeba histolytica Not Detected     Giardia lamblia Not Detected     Adenovirus F40/41 Not Detected     Astrovirus Not Detected     Norovirus GI/GII Not Detected     Rotavirus A Not Detected     Sapovirus (I, II, IV or V) Not Detected    MRSA Screen, PCR (Inpatient) - Swab, Nares [[id number]]  (Normal) Collected: 04/25/24 1542    Lab Status: Final result Specimen: Swab from Nares Updated: 04/25/24 1716     MRSA PCR Negative    Narrative:      The negative predictive value of this diagnostic test is high and should only be used to consider de-escalating anti-MRSA therapy. A positive result may indicate colonization with MRSA and must be correlated clinically.  MRSA Negative    Clostridioides difficile Toxin - Stool, Per Rectum [[id number]]  (Normal) Collected: 04/25/24 1335    Lab Status: Final result Specimen: Stool from Per Rectum Updated: 04/25/24 1448    Narrative:      The following orders  were created for panel order Clostridioides difficile Toxin - Stool, Per Rectum.  Procedure                               Abnormality         Status                     ---------                               -----------         ------                     Clostridioides difficile...[[id number]]  Normal              Final result                 Please view results for these tests on the individual orders.    Clostridioides difficile Toxin, PCR - Stool, Per Rectum [[id number]]  (Normal) Collected: 04/25/24 1335    Lab Status: Final result Specimen: Stool from Per Rectum Updated: 04/25/24 1448     Toxigenic C. difficile by PCR Not Detected    Narrative:      The result indicates the absence of toxigenic C. difficile from stool specimen.     Urine Culture - Urine, Urine, Clean Catch [[id number]]  (Normal) Collected: 04/24/24 1016    Lab Status: Final result Specimen: Urine, Clean Catch Updated: 04/25/24 1023     Urine Culture No growth    Respiratory Panel PCR w/COVID-19(SARS-CoV-2) [facility] In-House, NP Swab in UTM/VTM, 2 HR TAT - Swab, Nasopharynx [[id number]]  (Normal) Collected: 04/24/24 1016    Lab Status: Final result Specimen: Swab from Nasopharynx Updated: 04/24/24 1115     ADENOVIRUS, PCR Not Detected     Coronavirus 229E Not Detected     Coronavirus HKU1 Not Detected     Coronavirus NL63 Not Detected     Coronavirus OC43 Not Detected     COVID19 Not Detected     Human Metapneumovirus Not Detected     Human Rhinovirus/Enterovirus Not Detected     Influenza A PCR Not Detected     Influenza B PCR Not Detected     Parainfluenza Virus 1 Not Detected     Parainfluenza Virus 2 Not Detected     Parainfluenza Virus 3 Not Detected     Parainfluenza Virus 4 Not Detected     RSV, PCR Not Detected     Bordetella pertussis pcr Not Detected     Bordetella parapertussis PCR Not Detected     Chlamydophila pneumoniae PCR Not Detected     Mycoplasma pneumo by PCR Not Detected    Narrative:      In the setting of a  positive respiratory panel with a viral infection PLUS a negative procalcitonin without other underlying concern for bacterial infection, consider observing off antibiotics or discontinuation of antibiotics and continue supportive care. If the respiratory panel is positive for atypical bacterial infection (Bordetella pertussis, Chlamydophila pneumoniae, or Mycoplasma pneumoniae), consider antibiotic de-escalation to target atypical bacterial infection.            No radiology results from the last 24 hrs    Results for orders placed during the hospital encounter of 07/09/21    Adult Transthoracic Echo Complete W/ Cont if Necessary Per Protocol    Interpretation Summary  · Estimated left ventricular EF = 55% Left ventricular systolic function is normal.  · Left ventricular diastolic function was normal.  · Left ventricular wall thickness is consistent with mild concentric hypertrophy.  · Trace mitral and tricuspid regurgitation.      Current medications:  Scheduled Meds:[Held by provider] amLODIPine, 5 mg, Oral, Q24H  budesonide-formoterol, 2 puff, Inhalation, BID - RT   And  tiotropium bromide monohydrate, 2 puff, Inhalation, Daily - RT  cefepime, 2,000 mg, Intravenous, Q24H  ferrous sulfate, 325 mg, Oral, Daily With Breakfast  fluticasone, 2 spray, Each Nare, Daily  heparin (porcine), 5,000 Units, Subcutaneous, Q12H  lactobacillus acidophilus, 1 capsule, Oral, Daily  midodrine, 10 mg, Oral, TID AC  pantoprazole, 40 mg, Oral, Q AM  pravastatin, 80 mg, Oral, Nightly  sodium bicarbonate, 650 mg, Oral, BID  sodium chloride, 10 mL, Intravenous, Q12H      Continuous Infusions:lactated ringers 990 mL with potassium chloride 20 mEq infusion, , Last Rate: 75 mL/hr at 04/26/24 0200      PRN Meds:.  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        Brief Hospital Course to date:  David Barfield is a 74 y.o. male  with history of non-small cell lung ca s/p neoadjuvant chemoimmunotherapy and RLL lobectomy currently on Opdivo (last 4/4/24), HTN, emphysema, presence of port and pacemaker, current tobacco use, recent admission (4/12-4/22)  for sepsis related to pyelonephritis who presented back with vague abd pain, nausea/vomiting and weakness. Initial labs with WBC 39, procal 0.48, Cr 3.25. CT chest with no acute findings. CT abd/pel with no new findings, improved perinephric fat stranding about the bilateral kidneys.      Leukocytosis  Sepsis with no known source of infection  - Last admission, Ucx and Bcx negative  - Completed 10 days of cefepime -- WBC up to 46 but normalized during admission. WBC 10.87 on 4/20 and 39 on admission   - Bcx pending, Ucx pending; resp PCR negative   - zosyn/vanc, will continue Zosyn for now although there is no strong evidence of infectious process.  - IVF   -Patient denies fever.  Patient's wife was present at the bedside reports that they have measured temperature up to 90s.  She thinks that this is fever however patient denies feeling febrile.  -This morning WBC has come down to 24.65.  This is a sharp decline from yesterday.  Leukostasis in the setting of antibiotic treatment.  Had a long conversation with ID and plan is to continue antibiotic treatment and carefully look for sources of infection.     ARACELI on CKD 3  Metabolic  acidosis  - ARACELI on last admission. Neph followed at that time  - Baseline Cr 0.9-1.1. Peaked to 8.45 last admission  - Cr continues to improve.   - IVF     HTN   -Blood pressure was on the low side.  Was started on midodrine and IV fluid.  Blood pressures has improved.  Will stop midodrine and monitor.  If blood pressure remains high we will restart home antihypertensives.      Non-small cell lung cancer  - Follows with Dr. Ashley. Last Opdivo treatment 4/4   -Will ask oncology to see the patient.     Anemia  - Improved. Follow up outpatient with hematology   -Transfuse 1 unit of packed red blood cell and monitor.      Expected Discharge Location and Transportation: Home  Expected Discharge to be determined  Expected discharge date/ time has not been documented.     DVT prophylaxis:  Medical DVT prophylaxis orders are present.         AM-PAC 6 Clicks Score (PT): 22 (04/25/24 1056)    CODE STATUS:   Code Status and Medical Interventions:   Ordered at: 04/24/24 0889     Code Status (Patient has no pulse and is not breathing):    CPR (Attempt to Resuscitate)     Medical Interventions (Patient has pulse or is breathing):    Full Support       Bharath Jimenez MD  04/26/24

## 2024-04-26 NOTE — PLAN OF CARE
Problem: Adult Inpatient Plan of Care  Goal: Plan of Care Review  Outcome: Ongoing, Progressing  Goal: Patient-Specific Goal (Individualized)  Outcome: Ongoing, Progressing  Goal: Absence of Hospital-Acquired Illness or Injury  Outcome: Ongoing, Progressing  Intervention: Identify and Manage Fall Risk  Description: Perform standard risk assessment on admission using a validated tool or comprehensive approach appropriate to the patient; reassess fall risk frequently, with change in status or transfer to another level of care.  Communicate fall injury risk to interprofessional healthcare team.  Determine need for increased observation, equipment and environmental modification, such as low bed, signage and supportive, nonskid footwear.  Adjust safety measures to individual developmental age, stage and identified risk factors.  Reinforce the importance of safety and physical activity with patient and family.  Perform regular intentional rounding to assess need for position change, pain assessment and personal needs, including assistance with toileting.  Recent Flowsheet Documentation  Taken 4/26/2024 0420 by Micaela Patel, RN  Safety Promotion/Fall Prevention:   activity supervised   assistive device/personal items within reach   clutter free environment maintained   fall prevention program maintained   lighting adjusted   mobility aid in reach   nonskid shoes/slippers when out of bed   room organization consistent   safety round/check completed  Taken 4/26/2024 0200 by Micaela Patel, RN  Safety Promotion/Fall Prevention:   activity supervised   assistive device/personal items within reach   clutter free environment maintained   fall prevention program maintained   lighting adjusted   mobility aid in reach   nonskid shoes/slippers when out of bed   room organization consistent   safety round/check completed  Taken 4/26/2024 0012 by Micaela Patel, RN  Safety Promotion/Fall Prevention:   activity supervised    assistive device/personal items within reach   clutter free environment maintained   fall prevention program maintained   lighting adjusted   mobility aid in reach   nonskid shoes/slippers when out of bed   room organization consistent   safety round/check completed  Taken 4/25/2024 2200 by Micaela Patel RN  Safety Promotion/Fall Prevention:   activity supervised   assistive device/personal items within reach   clutter free environment maintained   fall prevention program maintained   lighting adjusted   mobility aid in reach   nonskid shoes/slippers when out of bed   room organization consistent   safety round/check completed  Taken 4/25/2024 2000 by Micaela Patel RN  Safety Promotion/Fall Prevention:   activity supervised   assistive device/personal items within reach   clutter free environment maintained   fall prevention program maintained   lighting adjusted   mobility aid in reach   nonskid shoes/slippers when out of bed   room organization consistent   safety round/check completed  Intervention: Prevent Skin Injury  Description: Perform a screening for skin injury risk, such as pressure or moisture associated skin damage on admission and at regular intervals throughout hospital stay.  Keep all areas of skin (especially folds) clean and dry.  Maintain adequate skin hydration.  Relieve and redistribute pressure and protect bony prominences; implement measures based on patient-specific risk factors.  Match turning and repositioning schedule to clinical condition.  Encourage weight shift frequently; assist with reposition if unable to complete independently.  Float heels off bed; avoid pressure on the Achilles tendon.  Keep skin free from extended contact with medical devices.  Encourage functional activity and mobility, as early as tolerated.  Use aids (e.g., slide boards, mechanical lift) during transfer.  Recent Flowsheet Documentation  Taken 4/26/2024 0420 by Micaela Patel, RN  Body Position: position  changed independently  Taken 4/26/2024 0200 by Micaela Patel RN  Body Position: position changed independently  Taken 4/26/2024 0012 by Micaela Patel RN  Body Position: position changed independently  Taken 4/25/2024 2200 by Micaela Patel RN  Body Position: position changed independently  Taken 4/25/2024 2000 by Micaela Patel RN  Body Position: position changed independently  Intervention: Prevent and Manage VTE (Venous Thromboembolism) Risk  Description: Assess for VTE (venous thromboembolism) risk.  Encourage and assist with early ambulation.  Initiate and maintain compression or other therapy, as indicated, based on identified risk in accordance with organizational protocol and provider order.  Encourage both active and passive leg exercises while in bed, if unable to ambulate.  Recent Flowsheet Documentation  Taken 4/26/2024 0420 by Micaela Patel RN  Activity Management: activity encouraged  Taken 4/26/2024 0200 by Micaela Patel RN  Activity Management: activity encouraged  Taken 4/26/2024 0012 by Micaela Patel RN  Activity Management: activity encouraged  Taken 4/25/2024 2200 by Micaela Patel RN  Activity Management: activity encouraged  Taken 4/25/2024 2000 by Micaela Patel RN  Activity Management: activity encouraged  VTE Prevention/Management: (see mar) other (see comments)  Intervention: Prevent Infection  Description: Maintain skin and mucous membrane integrity; promote hand, oral and pulmonary hygiene.  Optimize fluid balance, nutrition, sleep and glycemic control to maximize infection resistance.  Identify potential sources of infection early to prevent or mitigate progression of infection (e.g., wound, lines, devices).  Evaluate ongoing need for invasive devices; remove promptly when no longer indicated.  Recent Flowsheet Documentation  Taken 4/25/2024 2000 by Micaela Patel RN  Infection Prevention:   environmental surveillance performed   equipment surfaces disinfected   hand  hygiene promoted   personal protective equipment utilized   rest/sleep promoted  Goal: Optimal Comfort and Wellbeing  Outcome: Ongoing, Progressing  Intervention: Provide Person-Centered Care  Description: Use a family-focused approach to care.  Develop trust and rapport by proactively providing information, encouraging questions, addressing concerns and offering reassurance.  Acknowledge emotional response to hospitalization.  Recognize and utilize personal coping strategies.  Honor spiritual and cultural preferences.  Recent Flowsheet Documentation  Taken 4/25/2024 2000 by Micaela Patel RN  Trust Relationship/Rapport:   care explained   choices provided   emotional support provided   empathic listening provided   questions answered   questions encouraged   thoughts/feelings acknowledged  Goal: Readiness for Transition of Care  Outcome: Ongoing, Progressing     Problem: Pain Acute  Goal: Acceptable Pain Control and Functional Ability  Outcome: Ongoing, Progressing  Intervention: Prevent or Manage Pain  Description: Evaluate pain level, effect of treatment and patient response at regular intervals.  Minimize painful stimuli; coordinate care and adjust environment (e.g., light, noise, unnecessary movement); promote sleep/rest.  Match pharmacologic analgesia to severity and type of pain mechanism (e.g., neuropathic, muscle, inflammatory); consider multimodal approach (e.g., nonopioid, opioid, adjuvant).  Provide medication at regular intervals; titrate to patient response; premedicate for painful procedures.  Manage breakthrough pain with additional doses; consider rotation or switching medication.  Monitor for signs of substance tolerance (increased dose to reach desired effect, decreased effect with same dose).  Manage medication-induced effects, such as constipation, nausea, pruritus, urinary retention, somnolence and dizziness.  Provide multimodal interventions, such as as physical activity, therapeutic  exercise, yoga, TENS (transcutaneous electrical nerve stimulation) and manual therapy.  Train in functional activity modifications, such as body mechanics, posture, ergonomics, energy conservation and activity pacing.  Consider addition of complementary or alternative therapy, such as acupuncture, hypnosis or therapeutic touch.  Recent Flowsheet Documentation  Taken 4/26/2024 0420 by Micaela Patel RN  Medication Review/Management: medications reviewed  Taken 4/26/2024 0200 by Micaela Patel RN  Medication Review/Management: medications reviewed  Taken 4/26/2024 0012 by Micaela Patel RN  Medication Review/Management: medications reviewed  Taken 4/25/2024 2200 by Micaela Patel RN  Medication Review/Management: medications reviewed  Taken 4/25/2024 2000 by Micaela Patel RN  Medication Review/Management: medications reviewed  Intervention: Optimize Psychosocial Wellbeing  Description: Facilitate patient’s self-control over pain by providing pain information and allowing choices in treatment.  Consider and address emotional response to pain.  Explore and promote use of coping strategies; address barriers to successful coping.  Evaluate and assist with psychosocial, cultural and spiritual factors impacting pain.  Modify pain perception using techniques, such as distraction, mindfulness, guided imagery, meditation or music.  Assess for risk factors for developing chronic pain, such as depression, fear, pain avoidance and pain catastrophizing.  Consider referral for ongoing coping support, such as education, relaxation training and role of thoughts.  Recent Flowsheet Documentation  Taken 4/25/2024 2000 by Micaela Patel RN  Diversional Activities: television     Problem: Infection  Goal: Absence of Infection Signs and Symptoms  Outcome: Ongoing, Progressing     Problem: Fall Injury Risk  Goal: Absence of Fall and Fall-Related Injury  Outcome: Ongoing, Progressing  Intervention: Identify and Manage  Contributors  Description: Develop a fall prevention plan with the patient and caregiver/family.  Provide reorientation, appropriate sensory stimulation and routines with changes in mental status to decrease risk of fall.  Promote use of personal vision and auditory aids.  Assess assistance level required for safe and effective self-care; provide support as needed, such as toileting, mobilization. For age 65 and older, implement timed toileting with assistance.  Encourage physical activity, such as performance of mobility and self-care at highest level of patient ability, multicomponent exercise program and provision of appropriate assistive devices.  If fall occurs, assess the severity of injury; implement fall injury protocol. Determine the cause and revise fall injury prevention plan.  Regularly review medication contribution to fall risk; adjust medication administration times to minimize risk of falling.  Consider risk related to polypharmacy and age.  Balance adequate pain management with potential for oversedation.  Recent Flowsheet Documentation  Taken 4/26/2024 0420 by Micaela Patel RN  Medication Review/Management: medications reviewed  Taken 4/26/2024 0200 by Micaela Patel RN  Medication Review/Management: medications reviewed  Taken 4/26/2024 0012 by Micaela Patel RN  Medication Review/Management: medications reviewed  Taken 4/25/2024 2200 by Micaela Patel RN  Medication Review/Management: medications reviewed  Taken 4/25/2024 2000 by Micaela Patel RN  Medication Review/Management: medications reviewed  Intervention: Promote Injury-Free Environment  Description: Provide a safe, barrier-free environment that encourages independent activity.  Keep care area uncluttered and well-lighted.  Determine need for increased observation or monitoring.  Avoid use of devices that minimize mobility, such as restraints or indwelling urinary catheter.  Recent Flowsheet Documentation  Taken 4/26/2024 0420 by  Bohara, Micaela, RN  Safety Promotion/Fall Prevention:   activity supervised   assistive device/personal items within reach   clutter free environment maintained   fall prevention program maintained   lighting adjusted   mobility aid in reach   nonskid shoes/slippers when out of bed   room organization consistent   safety round/check completed  Taken 4/26/2024 0200 by Micaela Patel RN  Safety Promotion/Fall Prevention:   activity supervised   assistive device/personal items within reach   clutter free environment maintained   fall prevention program maintained   lighting adjusted   mobility aid in reach   nonskid shoes/slippers when out of bed   room organization consistent   safety round/check completed  Taken 4/26/2024 0012 by Micaela Patel RN  Safety Promotion/Fall Prevention:   activity supervised   assistive device/personal items within reach   clutter free environment maintained   fall prevention program maintained   lighting adjusted   mobility aid in reach   nonskid shoes/slippers when out of bed   room organization consistent   safety round/check completed  Taken 4/25/2024 2200 by Micaela Patel RN  Safety Promotion/Fall Prevention:   activity supervised   assistive device/personal items within reach   clutter free environment maintained   fall prevention program maintained   lighting adjusted   mobility aid in reach   nonskid shoes/slippers when out of bed   room organization consistent   safety round/check completed  Taken 4/25/2024 2000 by iMcaela Patle RN  Safety Promotion/Fall Prevention:   activity supervised   assistive device/personal items within reach   clutter free environment maintained   fall prevention program maintained   lighting adjusted   mobility aid in reach   nonskid shoes/slippers when out of bed   room organization consistent   safety round/check completed     Problem: Adjustment to Illness (Sepsis/Septic Shock)  Goal: Optimal Coping  Outcome: Ongoing, Progressing     Problem:  Bleeding (Sepsis/Septic Shock)  Goal: Absence of Bleeding  Outcome: Ongoing, Progressing     Problem: Glycemic Control Impaired (Sepsis/Septic Shock)  Goal: Blood Glucose Level Within Desired Range  Outcome: Ongoing, Progressing     Problem: Infection Progression (Sepsis/Septic Shock)  Goal: Absence of Infection Signs and Symptoms  Outcome: Ongoing, Progressing  Intervention: Initiate Sepsis Management  Description: Provide fluid therapy, such as crystalloid or albumin, to increase intravascular volume, organ perfusion and oxygen delivery.  Provide respiratory support, such as oxygen therapy, noninvasive or invasive positive pressure ventilation, to achieve oxygenation and ventilation goal; avoid hyperoxemia.  Obtain cultures prior to initiating antimicrobial therapy when possible. Do not delay for laboratory results in the presence of high suspicion or clinical indicators.  Administer intravenous broad-spectrum antimicrobial therapy promptly.  Implement hemodynamic monitoring to guide intravascular support based on individual targeted parameters.  Determine and address underlying source of infection aggressively; implement transmission-based precautions and isolation, as indicated.  Recent Flowsheet Documentation  Taken 4/25/2024 2000 by Micaela Patel RN  Infection Prevention:   environmental surveillance performed   equipment surfaces disinfected   hand hygiene promoted   personal protective equipment utilized   rest/sleep promoted  Intervention: Promote Recovery  Description: Encourage pulmonary hygiene, such as cough-enhancement and airway-clearance techniques, that may include use of incentive spirometry, deep breathing and cough.  Encourage early rehabilitation and physical activity to optimize functional ability and activity tolerance, as well as minimize delirium.  Promote energy conservation; minimize oxygen demand and consumption by adjusting environment, decreasing stimulation, maintaining normothermia  and treating pain.  Optimize fluid balance, nutrition intake, sleep and glycemic control to maintain tissue perfusion and enhance immune response.  Recent Flowsheet Documentation  Taken 4/26/2024 0420 by Micaela Patel RN  Activity Management: activity encouraged  Taken 4/26/2024 0200 by Micaela Patel, RN  Activity Management: activity encouraged  Taken 4/26/2024 0012 by Micaela Patel, RN  Activity Management: activity encouraged  Taken 4/25/2024 2200 by Micaela Patel RN  Activity Management: activity encouraged  Taken 4/25/2024 2000 by Micaela Patel RN  Activity Management: activity encouraged     Problem: Nutrition Impaired (Sepsis/Septic Shock)  Goal: Optimal Nutrition Intake  Outcome: Ongoing, Progressing   Goal Outcome Evaluation:

## 2024-04-26 NOTE — CASE MANAGEMENT/SOCIAL WORK
Continued Stay Note  [facility]     Patient Name: David Barfield  MRN: [id number]  Today's Date: 4/26/2024    Admit Date: 4/24/2024    Plan: home   Discharge Plan       Row Name 04/26/24 0950       Plan    Plan home    Patient/Family in Agreement with Plan yes    Plan Comments Met with Mr. Barfield and his wife at the bedside to discuss PT recommendation for home health at discharge. Both patient and his wife declined. Patient's plan is home with wife, and she or other family will transport. No other discharge needs were identified today.  will continue to follow plan of care and assist with discharge planning needs as indicated.    Final Discharge Disposition Code 01 - home or self-care                   Discharge Codes    No documentation.                       Kaylen Godoy RN

## 2024-04-26 NOTE — PROGRESS NOTES
INFECTIOUS DISEASE PROGRESS NOTE    David Barfield  1950  [id number]    Date of consult: 4/25/24    Admit date: 4/24/2024    Requesting Provider: Dr. Mitchell  Evaluating physician: Derian Barry MD  Reason for Consultation: sepsis; recent pyelonephritis   Chief Complaint: Weakness, nausea/Vomiting      Subjective   History of present illness:  David Barfield is a  74 y.o.  Yr old male, with PMH NSCLC/RLL lobectomy (1/2024) recently on Opdivo (last dose on 4/4/24), HTN, and emphysema who I followed during a recent admission where he presented with nausea, vomiting, diarrhea, low back pain, and weakness.  The patient was noted to have bilateral perinephric stranding on CT scan but urine culture was no growth.  He did have pyuria and urinalysis.  He suffered from acute kidney injury with a creatinine that trended over 8 but was subsequently improving prior to discharge.  His C. Difficile test was negative.  diarrhea subsequently improved.  He received about 10 days of IV antibiotic therapy for  pyelonephritis with clinical improvement in his white blood cell count trended down.  He was discharged home on 4/22.    The patient presented back to the ER for vague abdominal pain, nausea, vomiting, and weakness.  He had initially done okay after his discharge and was eating and drinking okay but woke up with vague abdominal pain down both sides of his abdomen and started having some nausea and vomiting. No diarrhea. His wife was concerned that he would get worse due to dehydration incision called EMS and the patient was transported to the ER.    Since arrival, the patient has remained afebrile. White blood cell count significantly elevated at 39.14 with 91.2% neutrophils.  Lactic acid was 1.6 on arrival.  Pro-calcitonin was 0.48.  Lipase is 23.  LFTs were within normal limits.  Creatinine was 3.25, down from 4.19 at time of discharge.  Urinalysis showed 2+ blood, 2+ protein, 1+ leukocyte esterase, negative  nitrite, 6-10 RBCs, 11-20 WBCs, and no bacteria.  Urine culture is in progress.  Respiratory PCR panel was negative.  Blood cultures are in progress. CT chest showed stable postsurgical changes of the right lower lung with scarring and chronic appearing loculated right pleural effusion.  He did have mild emphysema with biapical scarring.  CT abdomen and pelvis showed no new significant abnormality.  Per radiology, the patient had a significant decrease in perinephric fat stranding about the bilateral kidneys and decreased bladder wall thickening compared to his CT from last admission.  The patient was started on empiric vancomycin and Zosyn by the primary team.  ID has been consult and for recommendations due to concern for sepsis and in the setting of his recent pyelonephritis.    Subjective:    4/26/24: The patient is feeling somewhat better today. No fevers. Nausea and emesis has improved. Diarrhea improved. No abdominal pain. No new rashes. No fevers. Leukocytosis improving. The patient does state that he has some pain in his perineum when he is bearing down to have a bowel movement and his wife questions if he could have prostatitis.    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    Diabetes mellitus Borderline    Emphysema/COPD     Erectile disorder     GERD (gastroesophageal reflux disease)     History of chemotherapy     Hyperlipidemia     Hypertension 2019    Lung nodule     Mumps     Mumps     Pruritus     after bath    Slow to wake up after anesthesia     Wears dentures     upper only    Wears hearing aid in both ears     usually only wears right       Past Surgical History:   Procedure Laterality Date    BONE BIOPSY      broken bone surgery in his face    BRONCHOSCOPY THORACOTOMY Right 1/9/2024    Procedure: THORACOTOMY FOR LOWER LOBECTOMY AND MEDISTINAL LYMPH NODE DISSECTION RIGHT;  Surgeon:  "Joey Patel MD;  Location: [facility] OR;  Service: Cardiothoracic;  Laterality: Right;    BRONCHOSCOPY WITH ION ROBOTIC ASSIST N/A 09/15/2023    Procedure: BRONCHOSCOPY NAVIGATION WITH ENDOBRONCHIAL ULTRASOUND AND ION ROBOT;  Surgeon: Octaviano Sampson MD;  Location: [facility] ENDOSCOPY;  Service: Robotics - Pulmonary;  Laterality: N/A;  ion #6 - [id number]  - 0015  Cath guide 0061    EBUS balloon removed and intact    CARDIAC ELECTROPHYSIOLOGY PROCEDURE N/A 08/17/2021    Procedure: Pacemaker DC new;  Surgeon: Kayy Box MD;  Location:  [facility];  Service: Cardiology;  Laterality: N/A;    FACIAL FRACTURE SURGERY      PACEMAKER IMPLANTATION         Pediatric History   Patient Parents    Not on file     Other Topics Concern    Not on file   Social History Narrative    Lives in [place], [place]       family history includes Aneurysm in his mother; Dementia in his father; Heart disease in his paternal grandmother; Hypertension in his paternal grandfather; Leukemia in his sister.    Allergies   Allergen Reactions    Cymbalta [Duloxetine Hcl] GI Intolerance    Gabapentin Mental Status Change     Pt states that this medication \"makes him feel foolish in his head\".     Remeron [Mirtazapine] Other (See Comments)     Excess sedation    Toradol [Ketorolac Tromethamine] GI Intolerance     Projectile vomiting     Latex Other (See Comments)     Latex allergy     Tape Rash       Immunization History   Administered Date(s) Administered    ABRYSVO (RSV, 60+ or pregnant women 32-36 wks) 10/16/2023    COVID-19 (PFIZER) BIVALENT 12+YRS 09/16/2022    COVID-19 (PFIZER) Purple Cap Monovalent 01/29/2021, 02/19/2021, 10/18/2021, 04/14/2022    COVID-19 F23 (PFIZER) 12YRS+ (COMIRNATY) 09/26/2023    Fluzone High-Dose 65+yrs 10/06/2023    Pneumococcal Conjugate 20-Valent (PCV20) 10/11/2023       Medication:    Current Facility-Administered Medications:     acetaminophen (TYLENOL) tablet 650 mg, 650 mg, Oral, Q4H " PRN, 650 mg at 04/26/24 1223 **OR** acetaminophen (TYLENOL) 160 MG/5ML oral solution 650 mg, 650 mg, Oral, Q4H PRN **OR** acetaminophen (TYLENOL) suppository 650 mg, 650 mg, Rectal, Q4H PRN, Mary Mitchell DO    [Held by provider] amLODIPine (NORVASC) tablet 5 mg, 5 mg, Oral, Q24H, Mary Mitchell DO    budesonide-formoterol (SYMBICORT) 160-4.5 MCG/ACT inhaler 2 puff, 2 puff, Inhalation, BID - RT, 2 puff at 04/26/24 0937 **AND** tiotropium (SPIRIVA RESPIMAT) 2.5 mcg/act aerosol solution inhaler, 2 puff, Inhalation, Daily - RT, Mary Mitchell DO, 2 puff at 04/26/24 0937    calcium carbonate (TUMS) chewable tablet 500 mg (200 mg elemental), 2 tablet, Oral, BID PRN, Mary Mitchell DO    cefepime 2000 mg IVPB in 100 mL NS (MBP), 2,000 mg, Intravenous, Q24H, Derian Barry MD, 2,000 mg at 04/25/24 2000    ferrous sulfate tablet 325 mg, 325 mg, Oral, Daily With Breakfast, Mary Mitchell DO, 325 mg at 04/26/24 0758    fluticasone (FLONASE) 50 MCG/ACT nasal spray 2 spray, 2 spray, Each Nare, Daily, Mary Mitchell DO, 2 spray at 04/26/24 0804    heparin (porcine) 5000 UNIT/ML injection 5,000 Units, 5,000 Units, Subcutaneous, Q12H, Mary Mitchell DO, 5,000 Units at 04/26/24 0804    HYDROcodone-acetaminophen (NORCO) 7.5-325 MG per tablet 1 tablet, 1 tablet, Oral, Q6H PRN, Mary Mitchell DO    lactated ringers 990 mL with potassium chloride 20 mEq infusion, , Intravenous, Continuous, [place], Kurt Martinez MD, Last Rate: 75 mL/hr at 04/26/24 0200, New Bag at 04/26/24 0200    lactobacillus acidophilus (RISAQUAD) capsule 1 capsule, 1 capsule, Oral, Daily, Derian Barry MD, 1 capsule at 04/26/24 1529    nitroglycerin (NITROSTAT) SL tablet 0.4 mg, 0.4 mg, Sublingual, Q5 Min PRN, Mary Mitchell DO    ondansetron ODT (ZOFRAN-ODT) disintegrating tablet 4 mg, 4 mg, Oral, Q6H PRN **OR** ondansetron (ZOFRAN) injection 4 mg, 4 mg, Intravenous, Q6H PRN, Mary Mitchell DO, 4 mg at 04/25/24 0440    pantoprazole  "(PROTONIX) EC tablet 40 mg, 40 mg, Oral, Q AM, Mary Mitchell DO, 40 mg at 04/26/24 0619    pravastatin (PRAVACHOL) tablet 80 mg, 80 mg, Oral, Nightly, Mary Mitchell DO, 80 mg at 04/25/24 2006    sodium bicarbonate tablet 650 mg, 650 mg, Oral, BID, Bharath Jimenez MD, 650 mg at 04/26/24 0804    sodium chloride 0.9 % flush 10 mL, 10 mL, Intravenous, Q12H, Mary Mitchell DO, 10 mL at 04/26/24 0804    sodium chloride 0.9 % flush 10 mL, 10 mL, Intravenous, PRN, Mary Mitchell DO    sodium chloride 0.9 % infusion 40 mL, 40 mL, Intravenous, PRN, Mary Mitchell DO    Please refer to the medical record for a full medication list    Review of Systems:    As above    Physical Exam:   Vital Signs   Temp:  [97.2 °F (36.2 °C)-99.7 °F (37.6 °C)] 98.4 °F (36.9 °C)  Heart Rate:  [70-83] 77  Resp:  [16-18] 18  BP: (102-130)/(56-74) 118/70    Temp  Min: 97.2 °F (36.2 °C)  Max: 99.7 °F (37.6 °C)  BP  Min: 102/58  Max: 130/61  Pulse  Min: 70  Max: 83  Resp  Min: 16  Max: 18  SpO2  Min: 93 %  Max: 99 %    Blood pressure 118/70, pulse 77, temperature 98.4 °F (36.9 °C), temperature source Oral, resp. rate 18, height 182.9 cm (72\"), weight 79.6 kg (175 lb 8 oz), SpO2 97%.  GENERAL: Awake and alert, in no acute distress. Appears stated age.  Frail  HEENT:  Normocephalic, atraumatic. No external oral lesions noted  EYES: PERRL. No conjunctival injection. No icterus.   HEART: RRR, no murmur  LUNGS: CTA B.  ABDOMEN: Soft, nontender, nondistended.  MSK: No joint effusions noted.  Full range of motion throughout.  No tenderness to palpation over his spine.  No CVA tenderness bilaterally.  GENITAL: no Yeung catheter  SKIN: No new rashes noted  PSYCHIATRIC: cooperative.  Appropriate mood and affect  EXT:  No cellulitic change.  NEURO: awake alert and oriented ×4.  Normal speech and cognition    Left-sided Mediport site is without any erythema or drainage    Right-sided AICD pocket site is without any erythema or tenderness    Results " Review:   I reviewed the patient's new clinical results.  I reviewed the patient's new imaging results and agree with the interpretation.  I reviewed the patient's other test results and agree with the interpretation    Results from last 7 days   Lab Units 04/26/24  0407 04/25/24  0727 04/24/24  0922   WBC 10*3/mm3 24.65* 45.90* 39.14*   HEMOGLOBIN g/dL 7.0* 7.8* 9.9*   HEMATOCRIT % 22.4* 24.4* 30.9*   PLATELETS 10*3/mm3 265 287 384     Results from last 7 days   Lab Units 04/26/24  0407   SODIUM mmol/L 139   POTASSIUM mmol/L 3.1*   CHLORIDE mmol/L 110*   CO2 mmol/L 15.0*   BUN mg/dL 21   CREATININE mg/dL 2.85*   GLUCOSE mg/dL 95   CALCIUM mg/dL 7.8*     Results from last 7 days   Lab Units 04/25/24  0727   ALK PHOS U/L 76   BILIRUBIN mg/dL 0.3   ALT (SGPT) U/L 10   AST (SGOT) U/L 12             Results from last 7 days   Lab Units 04/25/24  0727   VANCOMYCIN RM mcg/mL 10.50     Results from last 7 days   Lab Units 04/24/24  0922   LACTATE mmol/L 1.6     Estimated Creatinine Clearance: 25.2 mL/min (A) (by C-G formula based on SCr of 2.85 mg/dL (H)).  CPK          4/25/2024    07:27   Common Labs   Creatine Kinase 19       Procalitonin Results:      Lab 04/24/24  0922   PROCALCITONIN 0.48*      Brief Urine Lab Results  (Last result in the past 365 days)        Color   Clarity   Blood   Leuk Est   Nitrite   Protein   CREAT   Urine HCG        04/24/24 1016 Yellow   Clear   Moderate (2+)   Small (1+)   Negative   100 mg/dL (2+)                  Site   Date Value Ref Range Status   04/25/2024 Left Radial  Final     Estevan's Test   Date Value Ref Range Status   04/25/2024 N/A  Final     pH, Arterial   Date Value Ref Range Status   04/25/2024 7.362 7.350 - 7.450 pH units Final     pCO2, Arterial   Date Value Ref Range Status   04/25/2024 25.1 (L) 35.0 - 45.0 mm Hg Final     Comment:     84 Value below reference range     pO2, Arterial   Date Value Ref Range Status   04/25/2024 109.0 (H) 83.0 - 108.0 mm Hg Final     HCO3,  Arterial   Date Value Ref Range Status   04/25/2024 14.2 (L) 20.0 - 26.0 mmol/L Final     Base Excess, Arterial   Date Value Ref Range Status   04/25/2024 -10.1 (L) 0.0 - 2.0 mmol/L Final     Hemoglobin, Blood Gas   Date Value Ref Range Status   04/25/2024 7.5 (L) 13.5 - 17.5 g/dL Final     Comment:     84 Value below reference range     Hematocrit, Blood Gas   Date Value Ref Range Status   04/25/2024 23.1 (L) 38.0 - 51.0 % Final     Oxyhemoglobin   Date Value Ref Range Status   04/25/2024 97.1 94 - 99 % Final     Methemoglobin   Date Value Ref Range Status   04/25/2024 0.30 0.00 - 1.50 % Final     Carboxyhemoglobin   Date Value Ref Range Status   04/25/2024 1.6 0 - 2 % Final     CO2 Content   Date Value Ref Range Status   04/25/2024 15.0 (L) 22 - 33 mmol/L Final     Barometric Pressure for Blood Gas   Date Value Ref Range Status   04/25/2024   Final     Comment:     N/A     Modality   Date Value Ref Range Status   04/25/2024 Room Air  Final     FIO2   Date Value Ref Range Status   04/25/2024 21 % Final        Microbiology:  Urine culture: In progress    Blood culture ×2: In progress      Radiology:  Imaging Results (Last 72 Hours)       Procedure Component Value Units Date/Time    CT Abdomen Pelvis Without Contrast [[id number]] Collected: 04/24/24 1242     Updated: 04/24/24 1250    Narrative:      CT ABDOMEN PELVIS WO CONTRAST    Date of Exam: 4/24/2024 12:13 PM EDT    Indication: Sepsis, source unclear.    Comparison: 4/12/2024    Technique: Axial CT images were obtained of the abdomen and pelvis without the administration of contrast. Reconstructed coronal and sagittal images were also obtained. Automated exposure control and iterative construction methods were used.      Findings:  Included lung bases are unchanged in appearance from prior CT. No new abnormality.    No suspicious hepatic lesion. Gallbladder is unremarkable. No significant biliary ductal dilation. The spleen, pancreas, and bilateral adrenal glands  are unchanged.    There is significantly decreased perinephric fat stranding about the bilateral kidneys. There is no evidence of hydronephrosis or nephrolithiasis. No suspicious renal lesion.    Small hiatal hernia. No bowel obstruction. Normal appendix. A few scattered colonic diverticuli without evidence of diverticulitis.    No suspicious lymphadenopathy. Dense atherosclerotic calcifications of the aorta and branch vessels.    Decreased urinary bladder wall thickening which may be in part secondary to increased distention. Prostatomegaly.    Abdominal and pelvic wall soft tissues show no acute abnormality. There is no acute fracture or suspicious osseous lesion.      Impression:      Impression:  No new discrete abnormality of the abdomen or pelvis as compared to prior CT. Significantly decreased perinephric fat stranding about the bilateral kidneys. Decreased urinary bladder wall thickening which may be in part secondary to increased distention   compared to prior exam.            Electronically Signed: Raulito Light MD    4/24/2024 12:47 PM EDT    Workstation ID: [id number]    CT Chest Without Contrast Diagnostic [[id number]] Collected: 04/24/24 1231     Updated: 04/24/24 1240    Narrative:      CT CHEST WO CONTRAST DIAGNOSTIC    Date of Exam: 4/24/2024 12:13 PM EDT    Indication: Cough, sepsis, unclear source.    Comparison: CT chest 4/12/2024    Technique: Axial CT images were obtained of the chest without contrast administration.  Reconstructed coronal and sagittal images were also obtained. Automated exposure control and iterative construction methods were used.      Findings:  MEDIASTINUM: The heart size is stable. Trace pericardial fluid. Right chest wall AICD. Left chest wall infusion port with tip in the SVC.  CORONARY ARTERIES: There is calcified atherosclerotic disease.  LUNGS: There are postsurgical changes of the right lower lung with inferomedial scarring posteriorly. There is a loculated right  pleural effusion tracking into the fissure, similar to the previous study. There is diffuse emphysema with biapical scarring.  LYMPH NODES: Mildly prominent mediastinal lymph nodes are unchanged. There are small calcified subcarinal and left hilar nodes.    OSSEOUS STRUCTURES: Multilevel thoracic degenerative disc disease. Stable healing right-sided rib fractures. No acute osseous abnormality.      Impression:      Impression:    1. Stable postsurgical changes of the right lower lung with scarring and chronic appearing loculated right pleural effusion.  2. Mild emphysema and biapical scarring.        Electronically Signed: Aimee Freitas MD    4/24/2024 12:37 PM EDT    Workstation ID: [id number]    XR Chest 1 View [[id number]] Collected: 04/24/24 1035     Updated: 04/24/24 1040    Narrative:      XR CHEST 1 VW    Date of Exam: 4/24/2024 10:15 AM EDT    Indication: Sepsis, cough    Comparison: 4/12/2024    Findings:  Cardiomediastinal silhouette is stable. There are postsurgical changes of the right lung with a small chronic effusion. Left lung is clear. Left-sided port catheter with the tip in the SVC. Right AICD. No acute osseous abnormality identified.      Impression:      Impression:  Stable chronic appearance of the chest. No acute cardiopulmonary abnormality.      Electronically Signed: Aimee Freitas MD    4/24/2024 10:37 AM EDT    Workstation ID: [id number]            IMPRESSION:     Problems:  Severe leukocytosis with neutrophilia-Unclear etiology.  No diarrhea.  Could be having recurrent urinary infection/pyelonephritis although fat stranding around his kidneys appears improved on repeat CT abdomen and pelvis.  He does have a Mediport and a AICD in place.  He has a chronic appearing right-sided loculated pleural effusion at the area of scarring from his prior surgery. Unclear if he could have prostatitis given his pain in his perineum when he has bearing down for bowel movement. Blood cultures are No growth to  date.  We will continue to monitor on broad-spectrum antimicrobial coverage.   Now improving  Nausea and vomiting  Diarrhea-C. Difficile test was negative  Recent bilateral pyelonephritis  Pyuria/possible UTI  Recent acute renal failure-improving.  Creatinine has trended down to 3.25, from 4.19 at time of discharge on 4/22.  NSCLC/RLL lobectomy (1/2024)  Normocytic anemia-Improved from time of discharge  Emphysema    RECOMMENDATIONS:    -Continue to follow CBC and CMP closely. Monitor CPK level at least weekly while on daptomycin  -Continue to monitor urine culture and blood cultures closely  -GI PCR panel was negative  -TTE in order to help rule out infective endocarditis/lead infection especially given his AICD in place.  -IV fluids and antiemetics prn per primary team  -continue cefepime 2 g IV every 24 hours  -stop daptomycin   -Sent Karius test as etiology of recurrent sepsis remains unclear  -Could consider urology evaluation for possible prostatitis    I discussed in length with the patient, his wife, and his son at bedside today    I discussed the patient's case with Dr. Jimenez and Dr. Ashley today    I will be off over the weekend.  I will have someone check on the patient for me    Thank you for asking me to see David Barfield.  Our group would be pleased to follow this patient over the course of their hospitalization and assist with outpatient antimicrobial therapy, as indicated.  Further recommendations depend on the results of the cultures and clinical course.    Complex MDM    Derian Barry MD  4/26/2024

## 2024-04-26 NOTE — PLAN OF CARE
Goal Outcome Evaluation:  Plan of Care Reviewed With: patient, family        Progress: no change  Problem: Adult Inpatient Plan of Care  Goal: Plan of Care Review  Outcome: Ongoing, Progressing  Flowsheets (Taken 4/26/2024 1845)  Progress: no change  Plan of Care Reviewed With:   patient   family  Goal: Patient-Specific Goal (Individualized)  Outcome: Ongoing, Progressing  Goal: Absence of Hospital-Acquired Illness or Injury  Outcome: Ongoing, Progressing  Intervention: Identify and Manage Fall Risk  Recent Flowsheet Documentation  Taken 4/26/2024 0742 by Lenore Poole RN  Safety Promotion/Fall Prevention:   assistive device/personal items within reach   activity supervised   nonskid shoes/slippers when out of bed  Intervention: Prevent Skin Injury  Recent Flowsheet Documentation  Taken 4/26/2024 0742 by Lenore Poole RN  Body Position: position changed independently  Skin Protection: tubing/devices free from skin contact  Intervention: Prevent and Manage VTE (Venous Thromboembolism) Risk  Recent Flowsheet Documentation  Taken 4/26/2024 0804 by Lenore Poole RN  VTE Prevention/Management: (sub q heparin) other (see comments)  Taken 4/26/2024 0742 by Lenore Poole RN  Activity Management: activity encouraged  Goal: Optimal Comfort and Wellbeing  Outcome: Ongoing, Progressing  Intervention: Monitor Pain and Promote Comfort  Recent Flowsheet Documentation  Taken 4/26/2024 1223 by Lenore Poole RN  Pain Management Interventions: see MAR  Intervention: Provide Person-Centered Care  Recent Flowsheet Documentation  Taken 4/26/2024 0742 by Lenore Poole RN  Trust Relationship/Rapport:   care explained   choices provided   thoughts/feelings acknowledged  Goal: Readiness for Transition of Care  Outcome: Ongoing, Progressing     Problem: Pain Acute  Goal: Acceptable Pain Control and Functional Ability  Outcome: Ongoing, Progressing  Intervention: Prevent or Manage Pain  Recent Flowsheet Documentation  Taken 4/26/2024 0742 by Zulema  HAWA Grover  Sensory Stimulation Regulation: television on  Sleep/Rest Enhancement: therapeutic touch utilized  Intervention: Develop Pain Management Plan  Recent Flowsheet Documentation  Taken 4/26/2024 1223 by Lenore Poole RN  Pain Management Interventions: see MAR  Intervention: Optimize Psychosocial Wellbeing  Recent Flowsheet Documentation  Taken 4/26/2024 0742 by Lenore Poole RN  Supportive Measures:   active listening utilized   verbalization of feelings encouraged  Diversional Activities:   television   smartphone  Spiritual Activities Assistance: affirmation provided     Problem: Infection  Goal: Absence of Infection Signs and Symptoms  Outcome: Ongoing, Progressing  Intervention: Prevent or Manage Infection  Recent Flowsheet Documentation  Taken 4/26/2024 0742 by Lenore Poole RN  Infection Management: aseptic technique maintained  Fever Reduction/Comfort Measures:   lightweight bedding   lightweight clothing     Problem: Fall Injury Risk  Goal: Absence of Fall and Fall-Related Injury  Outcome: Ongoing, Progressing  Intervention: Identify and Manage Contributors  Recent Flowsheet Documentation  Taken 4/26/2024 0742 by Lenore Poole RN  Self-Care Promotion:   BADL personal objects within reach   BADL personal routines maintained   safe use of adaptive equipment encouraged  Intervention: Promote Injury-Free Environment  Recent Flowsheet Documentation  Taken 4/26/2024 0742 by Lenore Poole RN  Safety Promotion/Fall Prevention:   assistive device/personal items within reach   activity supervised   nonskid shoes/slippers when out of bed     Problem: Adjustment to Illness (Sepsis/Septic Shock)  Goal: Optimal Coping  Outcome: Ongoing, Progressing  Intervention: Optimize Psychosocial Adjustment to Illness  Recent Flowsheet Documentation  Taken 4/26/2024 0742 by Lenore Poole RN  Supportive Measures:   active listening utilized   verbalization of feelings encouraged  Family/Support System Care: self-care encouraged      Problem: Bleeding (Sepsis/Septic Shock)  Goal: Absence of Bleeding  Outcome: Ongoing, Progressing     Problem: Glycemic Control Impaired (Sepsis/Septic Shock)  Goal: Blood Glucose Level Within Desired Range  Outcome: Ongoing, Progressing  Intervention: Optimize Glycemic Control  Recent Flowsheet Documentation  Taken 4/26/2024 0742 by Lenore Poole RN  Glycemic Management: oral hydration promoted     Problem: Infection Progression (Sepsis/Septic Shock)  Goal: Absence of Infection Signs and Symptoms  Outcome: Ongoing, Progressing  Intervention: Initiate Sepsis Management  Recent Flowsheet Documentation  Taken 4/26/2024 0742 by Lenore Poole RN  Infection Management: aseptic technique maintained  Intervention: Promote Recovery  Recent Flowsheet Documentation  Taken 4/26/2024 0742 by Lenore Poole RN  Activity Management: activity encouraged  Airway/Ventilation Support: pulmonary hygiene promoted  Sleep/Rest Enhancement: therapeutic touch utilized  Intervention: Promote Stabilization  Recent Flowsheet Documentation  Taken 4/26/2024 0742 by Lenore Poole RN  Fever Reduction/Comfort Measures:   lightweight bedding   lightweight clothing     Problem: Nutrition Impaired (Sepsis/Septic Shock)  Goal: Optimal Nutrition Intake  Outcome: Ongoing, Progressing

## 2024-04-27 ENCOUNTER — APPOINTMENT (OUTPATIENT)
Dept: CARDIOLOGY | Facility: HOSPITAL | Age: 74
DRG: 871 | End: 2024-04-27
Payer: MEDICARE

## 2024-04-27 LAB
ANION GAP SERPL CALCULATED.3IONS-SCNC: 12 MMOL/L (ref 5–15)
ASCENDING AORTA: 4 CM
BASOPHILS # BLD AUTO: 0.05 10*3/MM3 (ref 0–0.2)
BASOPHILS NFR BLD AUTO: 0.5 % (ref 0–1.5)
BH BB BLOOD EXPIRATION DATE: NORMAL
BH BB BLOOD TYPE BARCODE: 600
BH BB DISPENSE STATUS: NORMAL
BH BB PRODUCT CODE: NORMAL
BH BB UNIT NUMBER: NORMAL
BH CV ECHO MEAS - AI P1/2T: 447.3 MSEC
BH CV ECHO MEAS - AO MAX PG: 5.3 MMHG
BH CV ECHO MEAS - AO MEAN PG: 3 MMHG
BH CV ECHO MEAS - AO ROOT DIAM: 4 CM
BH CV ECHO MEAS - AO V2 MAX: 115.3 CM/SEC
BH CV ECHO MEAS - AO V2 VTI: 22.7 CM
BH CV ECHO MEAS - AVA(I,D): 2.8 CM2
BH CV ECHO MEAS - EDV(CUBED): 85.2 ML
BH CV ECHO MEAS - EDV(MOD-SP2): 144 ML
BH CV ECHO MEAS - EDV(MOD-SP4): 143 ML
BH CV ECHO MEAS - EF(MOD-BP): 66 %
BH CV ECHO MEAS - EF(MOD-SP2): 74.8 %
BH CV ECHO MEAS - EF(MOD-SP4): 56.6 %
BH CV ECHO MEAS - ESV(CUBED): 19.7 ML
BH CV ECHO MEAS - ESV(MOD-SP2): 36.3 ML
BH CV ECHO MEAS - ESV(MOD-SP4): 62 ML
BH CV ECHO MEAS - FS: 38.6 %
BH CV ECHO MEAS - IVS/LVPW: 0.64 CM
BH CV ECHO MEAS - IVSD: 0.9 CM
BH CV ECHO MEAS - LA DIMENSION: 4.1 CM
BH CV ECHO MEAS - LAT PEAK E' VEL: 7 CM/SEC
BH CV ECHO MEAS - LV MASS(C)D: 180 GRAMS
BH CV ECHO MEAS - LV MAX PG: 2.9 MMHG
BH CV ECHO MEAS - LV MEAN PG: 1.67 MMHG
BH CV ECHO MEAS - LV V1 MAX: 85.8 CM/SEC
BH CV ECHO MEAS - LV V1 VTI: 16.7 CM
BH CV ECHO MEAS - LVIDD: 4.4 CM
BH CV ECHO MEAS - LVIDS: 2.7 CM
BH CV ECHO MEAS - LVOT AREA: 3.8 CM2
BH CV ECHO MEAS - LVOT DIAM: 2.2 CM
BH CV ECHO MEAS - LVPWD: 1.4 CM
BH CV ECHO MEAS - MED PEAK E' VEL: 5 CM/SEC
BH CV ECHO MEAS - MV A MAX VEL: 79.7 CM/SEC
BH CV ECHO MEAS - MV DEC SLOPE: 283 CM/SEC2
BH CV ECHO MEAS - MV DEC TIME: 0.3 SEC
BH CV ECHO MEAS - MV E MAX VEL: 53.2 CM/SEC
BH CV ECHO MEAS - MV E/A: 0.67
BH CV ECHO MEAS - MV P1/2T: 79.5 MSEC
BH CV ECHO MEAS - MVA(P1/2T): 2.8 CM2
BH CV ECHO MEAS - PA ACC TIME: 0.15 SEC
BH CV ECHO MEAS - PA V2 MAX: 85 CM/SEC
BH CV ECHO MEAS - RAP SYSTOLE: 3 MMHG
BH CV ECHO MEAS - RVSP: 21 MMHG
BH CV ECHO MEAS - SV(LVOT): 63.6 ML
BH CV ECHO MEAS - SV(MOD-SP2): 107.7 ML
BH CV ECHO MEAS - SV(MOD-SP4): 81 ML
BH CV ECHO MEAS - TAPSE (>1.6): 2.6 CM
BH CV ECHO MEAS - TR MAX PG: 18.1 MMHG
BH CV ECHO MEAS - TR MAX VEL: 213 CM/SEC
BH CV ECHO MEASUREMENTS AVERAGE E/E' RATIO: 8.87
BH CV VAS BP LEFT ARM: NORMAL MMHG
BH CV XLRA - RV BASE: 5.5 CM
BH CV XLRA - RV LENGTH: 7.2 CM
BH CV XLRA - RV MID: 3.9 CM
BH CV XLRA - TDI S': 14 CM/SEC
BUN SERPL-MCNC: 16 MG/DL (ref 8–23)
BUN/CREAT SERPL: 6 (ref 7–25)
CALCIUM SPEC-SCNC: 7.9 MG/DL (ref 8.6–10.5)
CHLORIDE SERPL-SCNC: 114 MMOL/L (ref 98–107)
CO2 SERPL-SCNC: 17 MMOL/L (ref 22–29)
CREAT SERPL-MCNC: 2.68 MG/DL (ref 0.76–1.27)
CROSSMATCH INTERPRETATION: NORMAL
DEPRECATED RDW RBC AUTO: 59.4 FL (ref 37–54)
EGFRCR SERPLBLD CKD-EPI 2021: 24 ML/MIN/1.73
EOSINOPHIL # BLD AUTO: 0.36 10*3/MM3 (ref 0–0.4)
EOSINOPHIL NFR BLD AUTO: 3.6 % (ref 0.3–6.2)
ERYTHROCYTE [DISTWIDTH] IN BLOOD BY AUTOMATED COUNT: 17.8 % (ref 12.3–15.4)
GLUCOSE SERPL-MCNC: 99 MG/DL (ref 65–99)
HCT VFR BLD AUTO: 28.8 % (ref 37.5–51)
HGB BLD-MCNC: 9.3 G/DL (ref 13–17.7)
IMM GRANULOCYTES # BLD AUTO: 0.09 10*3/MM3 (ref 0–0.05)
IMM GRANULOCYTES NFR BLD AUTO: 0.9 % (ref 0–0.5)
IVRT: 81 MS
LEFT ATRIUM VOLUME INDEX: 33.7 ML/M2
LV EF 2D ECHO EST: 66 %
LYMPHOCYTES # BLD AUTO: 1.45 10*3/MM3 (ref 0.7–3.1)
LYMPHOCYTES NFR BLD AUTO: 14.3 % (ref 19.6–45.3)
MAGNESIUM SERPL-MCNC: 1.3 MG/DL (ref 1.6–2.4)
MCH RBC QN AUTO: 29.6 PG (ref 26.6–33)
MCHC RBC AUTO-ENTMCNC: 32.3 G/DL (ref 31.5–35.7)
MCV RBC AUTO: 91.7 FL (ref 79–97)
MONOCYTES # BLD AUTO: 1.52 10*3/MM3 (ref 0.1–0.9)
MONOCYTES NFR BLD AUTO: 15 % (ref 5–12)
NEUTROPHILS NFR BLD AUTO: 6.67 10*3/MM3 (ref 1.7–7)
NEUTROPHILS NFR BLD AUTO: 65.7 % (ref 42.7–76)
NRBC BLD AUTO-RTO: 0 /100 WBC (ref 0–0.2)
PLATELET # BLD AUTO: 305 10*3/MM3 (ref 140–450)
PMV BLD AUTO: 9.1 FL (ref 6–12)
POTASSIUM SERPL-SCNC: 3.5 MMOL/L (ref 3.5–5.2)
RBC # BLD AUTO: 3.14 10*6/MM3 (ref 4.14–5.8)
SODIUM SERPL-SCNC: 143 MMOL/L (ref 136–145)
UNIT  ABO: NORMAL
UNIT  RH: NORMAL
WBC NRBC COR # BLD AUTO: 10.14 10*3/MM3 (ref 3.4–10.8)

## 2024-04-27 PROCEDURE — 99232 SBSQ HOSP IP/OBS MODERATE 35: CPT | Performed by: INTERNAL MEDICINE

## 2024-04-27 PROCEDURE — 80048 BASIC METABOLIC PNL TOTAL CA: CPT | Performed by: INTERNAL MEDICINE

## 2024-04-27 PROCEDURE — 94799 UNLISTED PULMONARY SVC/PX: CPT

## 2024-04-27 PROCEDURE — 93306 TTE W/DOPPLER COMPLETE: CPT

## 2024-04-27 PROCEDURE — 83735 ASSAY OF MAGNESIUM: CPT | Performed by: INTERNAL MEDICINE

## 2024-04-27 PROCEDURE — 0 DEXTROSE 5 % SOLUTION 1,000 ML FLEX CONT: Performed by: INTERNAL MEDICINE

## 2024-04-27 PROCEDURE — 94761 N-INVAS EAR/PLS OXIMETRY MLT: CPT

## 2024-04-27 PROCEDURE — 94664 DEMO&/EVAL PT USE INHALER: CPT

## 2024-04-27 PROCEDURE — 25010000002 CEFEPIME PER 500 MG: Performed by: INTERNAL MEDICINE

## 2024-04-27 PROCEDURE — 85025 COMPLETE CBC W/AUTO DIFF WBC: CPT | Performed by: INTERNAL MEDICINE

## 2024-04-27 PROCEDURE — 25010000002 HEPARIN (PORCINE) PER 1000 UNITS: Performed by: INTERNAL MEDICINE

## 2024-04-27 PROCEDURE — 93306 TTE W/DOPPLER COMPLETE: CPT | Performed by: INTERNAL MEDICINE

## 2024-04-27 RX ADMIN — PANTOPRAZOLE SODIUM 40 MG: 40 TABLET, DELAYED RELEASE ORAL at 04:33

## 2024-04-27 RX ADMIN — HYDROCODONE BITARTRATE AND ACETAMINOPHEN 1 TABLET: 7.5; 325 TABLET ORAL at 23:15

## 2024-04-27 RX ADMIN — SODIUM BICARBONATE 150 MEQ: 84 INJECTION, SOLUTION INTRAVENOUS at 23:20

## 2024-04-27 RX ADMIN — HEPARIN SODIUM 5000 UNITS: 5000 INJECTION INTRAVENOUS; SUBCUTANEOUS at 09:01

## 2024-04-27 RX ADMIN — FLUTICASONE PROPIONATE 2 SPRAY: 50 SPRAY, METERED NASAL at 09:01

## 2024-04-27 RX ADMIN — Medication 10 ML: at 09:02

## 2024-04-27 RX ADMIN — TIOTROPIUM BROMIDE INHALATION SPRAY 2 PUFF: 3.12 SPRAY, METERED RESPIRATORY (INHALATION) at 07:58

## 2024-04-27 RX ADMIN — Medication 1 CAPSULE: at 09:01

## 2024-04-27 RX ADMIN — HEPARIN SODIUM 5000 UNITS: 5000 INJECTION INTRAVENOUS; SUBCUTANEOUS at 20:56

## 2024-04-27 RX ADMIN — FERROUS SULFATE TAB 325 MG (65 MG ELEMENTAL FE) 325 MG: 325 (65 FE) TAB at 09:01

## 2024-04-27 RX ADMIN — POTASSIUM CHLORIDE 40 MEQ: 750 CAPSULE, EXTENDED RELEASE ORAL at 17:53

## 2024-04-27 RX ADMIN — SODIUM BICARBONATE 150 MEQ: 84 INJECTION, SOLUTION INTRAVENOUS at 12:59

## 2024-04-27 RX ADMIN — BUDESONIDE AND FORMOTEROL FUMARATE DIHYDRATE 2 PUFF: 160; 4.5 AEROSOL RESPIRATORY (INHALATION) at 07:58

## 2024-04-27 RX ADMIN — BUDESONIDE AND FORMOTEROL FUMARATE DIHYDRATE 2 PUFF: 160; 4.5 AEROSOL RESPIRATORY (INHALATION) at 20:41

## 2024-04-27 RX ADMIN — POTASSIUM CHLORIDE 40 MEQ: 750 CAPSULE, EXTENDED RELEASE ORAL at 09:01

## 2024-04-27 RX ADMIN — CEFEPIME 2000 MG: 2 INJECTION, POWDER, FOR SOLUTION INTRAVENOUS at 20:56

## 2024-04-27 NOTE — PROGRESS NOTES
[facility] Medicine Services  PROGRESS NOTE    Patient Name: David Barfield  : 1950  MRN: [id number]    Date of Admission: 2024  Primary Care Physician: Shahid Drake MD    Subjective   Subjective     CC:  N/V, weakness    HPI:  Resting in bed no acute distress and tells me that he feels better.  He does not have any specific complaint today.  Patient's wife is also present at the bedside.  Denies any fever or chills.  No chest pain or palpitation or shortness of breath at rest.  No nausea or vomiting or diarrhea.      Objective   Objective     Vital Signs:   Temp:  [97.8 °F (36.6 °C)-98.8 °F (37.1 °C)] 97.9 °F (36.6 °C)  Heart Rate:  [70-81] 81  Resp:  [16-18] 16  BP: (127-169)/(67-94) 127/89     Physical Exam:  Constitutional: No acute distress, awake, alert  HENT: NCAT, mucous membranes moist  Respiratory: Clear to auscultation bilaterally, respiratory effort normal   Cardiovascular: RRR, no murmurs, rubs, or gallops  Gastrointestinal: Positive bowel sounds, soft, nontender, nondistended  Musculoskeletal: No bilateral ankle edema  Psychiatric: Appropriate affect, cooperative  Neurologic: Oriented x 3, strength symmetric in all extremities, Cranial Nerves grossly intact to confrontation, speech clear  Skin: No rashes     Results Reviewed:  LAB RESULTS:      Lab 24  0410 247 24  0407 24  0727 24  0922   WBC 10.14  --  24.65* 45.90* 39.14*   HEMOGLOBIN 9.3* 8.8* 7.0* 7.8* 9.9*   HEMATOCRIT 28.8* 27.5* 22.4* 24.4* 30.9*   PLATELETS 305  --  265 287 384   NEUTROS ABS 6.67  --  21.27* 41.95* 35.71*   IMMATURE GRANS (ABS) 0.09*  --  0.56* 0.77* 0.38*   LYMPHS ABS 1.45  --  0.96 1.22 0.89   MONOS ABS 1.52*  --  1.54* 1.83* 2.01*   EOS ABS 0.36  --  0.27 0.03 0.04   MCV 91.7  --  98.2* 97.2* 96.6   PROCALCITONIN  --   --   --   --  0.48*   LACTATE  --   --   --   --  1.6   LDH  --   --  118*  --   --          Lab 24  0410 24  0407  04/25/24 2114 04/25/24 0727 04/24/24  0922 04/22/24  0715 04/21/24  0403   SODIUM 143 139 137 139 140 142 141   POTASSIUM 3.5 3.1* 3.3* 3.2* 3.8 4.0 4.0   CHLORIDE 114* 110* 110* 110* 108* 108* 109*   CO2 17.0* 15.0* 15.0* 15.0* 16.0* 19.0* 17.0*   ANION GAP 12.0 14.0 12.0 14.0 16.0* 15.0 15.0   BUN 16 21 22 22 24* 26* 32*   CREATININE 2.68* 2.85* 2.88* 3.22* 3.25* 4.19* 5.22*   EGFR 24.0* 22.3* 22.1* 19.3* 19.1* 14.1* 10.8*   GLUCOSE 99 95 97 93 115* 101* 93   CALCIUM 7.9* 7.8* 7.8* 7.5* 9.0 8.7 8.5*   MAGNESIUM 1.3* 1.3*  --  1.1*  --   --   --    PHOSPHORUS  --  3.0  --   --   --  4.5 4.8*   TSH  --  0.736  --   --   --   --   --          Lab 04/25/24 0727 04/24/24 0922 04/22/24  0715 04/21/24  0403   TOTAL PROTEIN 5.8* 8.5  --   --    ALBUMIN 2.8* 3.5 3.3* 2.9*   GLOBULIN 3.0 5.0  --   --    ALT (SGPT) 10 18  --   --    AST (SGOT) 12 25  --   --    BILIRUBIN 0.3 0.3  --   --    ALK PHOS 76 90  --   --    LIPASE  --  23  --   --          Lab 04/24/24  1125 04/24/24  0922   HSTROP T 26* 29*             Lab 04/26/24  0852   ABO TYPING A   RH TYPING Negative   ANTIBODY SCREEN Negative         Lab 04/25/24  0908   PH, ARTERIAL 7.362   PCO2, ARTERIAL 25.1*   PO2 .0*   FIO2 21   HCO3 ART 14.2*   BASE EXCESS ART -10.1*   CARBOXYHEMOGLOBIN 1.6     Brief Urine Lab Results  (Last result in the past 365 days)        Color   Clarity   Blood   Leuk Est   Nitrite   Protein   CREAT   Urine HCG        04/24/24 1016 Yellow   Clear   Moderate (2+)   Small (1+)   Negative   100 mg/dL (2+)                   Microbiology Results Abnormal       Procedure Component Value - Date/Time    Blood Culture - Blood, Arm, Right [[id number]]  (Normal) Collected: 04/24/24 1049    Lab Status: Preliminary result Specimen: Blood from Arm, Right Updated: 04/27/24 1130     Blood Culture No growth at 3 days    Narrative:      Less than seven (7) mL's of blood was collected.  Insufficient quantity may yield false negative results.    Blood  Culture - Blood, Arm, Left [[id number]]  (Normal) Collected: 04/24/24 1100    Lab Status: Preliminary result Specimen: Blood from Arm, Left Updated: 04/27/24 1130     Blood Culture No growth at 3 days    Narrative:      Less than seven (7) mL's of blood was collected.  Insufficient quantity may yield false negative results.    Gastrointestinal Panel, PCR - Stool, Per Rectum [[id number]]  (Normal) Collected: 04/25/24 2026    Lab Status: Final result Specimen: Stool from Per Rectum Updated: 04/26/24 0742     Campylobacter Not Detected     Plesiomonas shigelloides Not Detected     Salmonella Not Detected     Vibrio Not Detected     Vibrio cholerae Not Detected     Yersinia enterocolitica Not Detected     Enteroaggregative E. coli (EAEC) Not Detected     Enteropathogenic E. coli (EPEC) Not Detected     Enterotoxigenic E. coli (ETEC) lt/st Not Detected     Shiga-like toxin-producing E. coli (STEC) stx1/stx2 Not Detected     Shigella/Enteroinvasive E. coli (EIEC) Not Detected     Cryptosporidium Not Detected     Cyclospora cayetanensis Not Detected     Entamoeba histolytica Not Detected     Giardia lamblia Not Detected     Adenovirus F40/41 Not Detected     Astrovirus Not Detected     Norovirus GI/GII Not Detected     Rotavirus A Not Detected     Sapovirus (I, II, IV or V) Not Detected    MRSA Screen, PCR (Inpatient) - Swab, Nares [[id number]]  (Normal) Collected: 04/25/24 1542    Lab Status: Final result Specimen: Swab from Nares Updated: 04/25/24 1716     MRSA PCR Negative    Narrative:      The negative predictive value of this diagnostic test is high and should only be used to consider de-escalating anti-MRSA therapy. A positive result may indicate colonization with MRSA and must be correlated clinically.  MRSA Negative    Clostridioides difficile Toxin - Stool, Per Rectum [[id number]]  (Normal) Collected: 04/25/24 1335    Lab Status: Final result Specimen: Stool from Per Rectum Updated: 04/25/24 1448    Narrative:       The following orders were created for panel order Clostridioides difficile Toxin - Stool, Per Rectum.  Procedure                               Abnormality         Status                     ---------                               -----------         ------                     Clostridioides difficile...[[id number]]  Normal              Final result                 Please view results for these tests on the individual orders.    Clostridioides difficile Toxin, PCR - Stool, Per Rectum [[id number]]  (Normal) Collected: 04/25/24 1335    Lab Status: Final result Specimen: Stool from Per Rectum Updated: 04/25/24 1448     Toxigenic C. difficile by PCR Not Detected    Narrative:      The result indicates the absence of toxigenic C. difficile from stool specimen.     Urine Culture - Urine, Urine, Clean Catch [[id number]]  (Normal) Collected: 04/24/24 1016    Lab Status: Final result Specimen: Urine, Clean Catch Updated: 04/25/24 1023     Urine Culture No growth    Respiratory Panel PCR w/COVID-19(SARS-CoV-2) [facility] In-House, NP Swab in UTM/VTM, 2 HR TAT - Swab, Nasopharynx [[id number]]  (Normal) Collected: 04/24/24 1016    Lab Status: Final result Specimen: Swab from Nasopharynx Updated: 04/24/24 1115     ADENOVIRUS, PCR Not Detected     Coronavirus 229E Not Detected     Coronavirus HKU1 Not Detected     Coronavirus NL63 Not Detected     Coronavirus OC43 Not Detected     COVID19 Not Detected     Human Metapneumovirus Not Detected     Human Rhinovirus/Enterovirus Not Detected     Influenza A PCR Not Detected     Influenza B PCR Not Detected     Parainfluenza Virus 1 Not Detected     Parainfluenza Virus 2 Not Detected     Parainfluenza Virus 3 Not Detected     Parainfluenza Virus 4 Not Detected     RSV, PCR Not Detected     Bordetella pertussis pcr Not Detected     Bordetella parapertussis PCR Not Detected     Chlamydophila pneumoniae PCR Not Detected     Mycoplasma pneumo by PCR Not Detected    Narrative:      In  the setting of a positive respiratory panel with a viral infection PLUS a negative procalcitonin without other underlying concern for bacterial infection, consider observing off antibiotics or discontinuation of antibiotics and continue supportive care. If the respiratory panel is positive for atypical bacterial infection (Bordetella pertussis, Chlamydophila pneumoniae, or Mycoplasma pneumoniae), consider antibiotic de-escalation to target atypical bacterial infection.            Adult Transthoracic Echo Complete W/ Cont if Necessary Per Protocol    Result Date: 4/27/2024    Left ventricular ejection fraction appears to be 61 - 65%.   Left ventricular wall thickness is consistent with mild concentric hypertroph   There is calcification and thickening of the aortic valve.  No independently mobile vegetations visualized.   Mild aortic valve regurgitation is present   Mild dilation of the aortic root is present.  Measures 4.0 cm.   There are pacemaker leads noted in the right atrium/right ventricle.   Mild tricuspid valve regurgitation is present. Estimated right ventricular systolic pressure from tricuspid regurgitation is normal (<35 mmHg).   Mild mitral valve regurgitation is present      Results for orders placed during the hospital encounter of 04/24/24    Adult Transthoracic Echo Complete W/ Cont if Necessary Per Protocol    Interpretation Summary    Left ventricular ejection fraction appears to be 61 - 65%.    Left ventricular wall thickness is consistent with mild concentric hypertroph    There is calcification and thickening of the aortic valve.  No independently mobile vegetations visualized.    Mild aortic valve regurgitation is present    Mild dilation of the aortic root is present.  Measures 4.0 cm.    There are pacemaker leads noted in the right atrium/right ventricle.    Mild tricuspid valve regurgitation is present. Estimated right ventricular systolic pressure from tricuspid regurgitation is normal  (<35 mmHg).    Mild mitral valve regurgitation is present      Current medications:  Scheduled Meds:[Held by provider] amLODIPine, 5 mg, Oral, Q24H  budesonide-formoterol, 2 puff, Inhalation, BID - RT   And  tiotropium bromide monohydrate, 2 puff, Inhalation, Daily - RT  cefepime, 2,000 mg, Intravenous, Q24H  ferrous sulfate, 325 mg, Oral, Daily With Breakfast  fluticasone, 2 spray, Each Nare, Daily  heparin (porcine), 5,000 Units, Subcutaneous, Q12H  lactobacillus acidophilus, 1 capsule, Oral, Daily  pantoprazole, 40 mg, Oral, Q AM  potassium chloride, 40 mEq, Oral, BID With Meals  pravastatin, 80 mg, Oral, Nightly  sodium chloride, 10 mL, Intravenous, Q12H      Continuous Infusions:sodium bicarbonate 8.4 % 150 mEq in dextrose (D5W) 5 % 1,000 mL infusion (greater than 100 mEq), 150 mEq, Last Rate: 150 mEq (04/27/24 1259)      PRN Meds:.  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        Brief Hospital Course to date:  David Barfield is a 74 y.o. male  with history of non-small cell lung ca s/p neoadjuvant chemoimmunotherapy and RLL lobectomy currently on Opdivo (last 4/4/24), HTN, emphysema, presence of port and pacemaker, current tobacco use, recent admission (4/12-4/22)  for sepsis related to pyelonephritis who presented back with vague abd pain,  nausea/vomiting and weakness. Initial labs with WBC 39, procal 0.48, Cr 3.25. CT chest with no acute findings. CT abd/pel with no new findings, improved perinephric fat stranding about the bilateral kidneys.      Leukocytosis  Sepsis with no known source of infection  - Last admission, Ucx and Bcx negative  - Completed 10 days of cefepime -- WBC up to 46 but normalized during admission. WBC 10.87 on 4/20 and 39 on admission   - Bcx pending, Ucx pending; resp PCR negative   - zosyn/vanc, will continue Zosyn for now although there is no strong evidence of infectious process.  - IVF   -Patient denies fever.  Patient's wife was present at the bedside reports that they have measured temperature up to 90s.  She thinks that this is fever however patient denies feeling febrile.  -WBC has normalized.  Patient remains afebrile.     ARACELI on CKD 3  Metabolic acidosis  - ARACELI on last admission. Neph followed at that time  - Baseline Cr 0.9-1.1. Peaked to 8.45 last admission  - Cr continues to improve.   - IVF     HTN   -Blood pressure was on the low side.  Was started on midodrine and IV fluid.  Blood pressures has improved.  Will stop midodrine and monitor.  If blood pressure remains high we will restart home antihypertensives.      Non-small cell lung cancer  - Follows with Dr. Ashley. Last Opdivo treatment 4/4   -Will ask oncology to see the patient.     Anemia  - Improved. Follow up outpatient with hematology   -Transfuse 1 unit of packed red blood cell and monitor.      Expected Discharge Location and Transportation: Home  Expected Discharge to be determined  Expected discharge date/ time has not been documented.     DVT prophylaxis:  Medical DVT prophylaxis orders are present.         AM-PAC 6 Clicks Score (PT): 22 (04/27/24 1262)    CODE STATUS:   Code Status and Medical Interventions:   Ordered at: 04/24/24 6006     Code Status (Patient has no pulse and is not breathing):    CPR (Attempt to Resuscitate)     Medical  Interventions (Patient has pulse or is breathing):    Full Support       Bharath Jimenez MD  04/27/24

## 2024-04-27 NOTE — PROGRESS NOTES
" LOS: 2 days   Patient Care Team:  Shahid Drake MD as PCP - General (Family Medicine)  Octaviano Sampson MD as Consulting Physician (Pulmonary Disease)  Neetu Ashley MD as Referring Physician (Hematology and Oncology)  Nimo Rodríguez MD as Consulting Physician (Radiation Oncology)    Chief Complaint: acute kidney disease.     Subjective     Doing well otherwise. No active complaints.     Subjective:  Symptoms:  Resolved.  No shortness of breath or chest pain.    Diet:  Adequate intake.        History taken from: patient    Objective     Vital Sign Min/Max for last 24 hours  Temp  Min: 97.2 °F (36.2 °C)  Max: 99.1 °F (37.3 °C)   BP  Min: 102/58  Max: 169/94   Pulse  Min: 70  Max: 83   Resp  Min: 16  Max: 18   SpO2  Min: 93 %  Max: 99 %   No data recorded   No data recorded     Flowsheet Rows      Flowsheet Row First Filed Value   Admission Height 182.9 cm (72\") Documented at 04/24/2024 0913   Admission Weight 73.5 kg (162 lb) Documented at 04/24/2024 0913            No intake/output data recorded.  I/O last 3 completed shifts:  In: 1958.8 [P.O.:720; I.V.:688.8; Blood:300; IV Piggyback:250]  Out: 5325 [Urine:4975; Stool:350]    Objective:  General Appearance:  Comfortable.    Vital signs: (most recent): Blood pressure 134/67, pulse 70, temperature 98.6 °F (37 °C), temperature source Oral, resp. rate 16, height 182.9 cm (72\"), weight 79.6 kg (175 lb 8 oz), SpO2 97%.  Vital signs are normal.    Output: Producing urine.    HEENT: Normal HEENT exam.    Lungs:  Normal effort and normal respiratory rate.  Breath sounds clear to auscultation.  He is not in respiratory distress.  No decreased breath sounds or wheezes.    Heart: Normal rate.  Regular rhythm.    Abdomen: Abdomen is soft and non-distended.  There are no signs of ascites.    Neurological: Patient is alert and oriented to person, place and time.  Patient has normal reflexes and normal muscle tone.    Pupils:  Pupils are equal, round, and reactive to light. " "   Skin:  Warm.                Results Review:     I reviewed the patient's new clinical results.    WBC WBC   Date Value Ref Range Status   04/26/2024 24.65 (H) 3.40 - 10.80 10*3/mm3 Final   04/25/2024 45.90 (C) 3.40 - 10.80 10*3/mm3 Final   04/24/2024 39.14 (C) 3.40 - 10.80 10*3/mm3 Final      HGB Hemoglobin   Date Value Ref Range Status   04/26/2024 7.0 (L) 13.0 - 17.7 g/dL Final   04/25/2024 7.8 (L) 13.0 - 17.7 g/dL Final   04/24/2024 9.9 (L) 13.0 - 17.7 g/dL Final      HCT Hematocrit   Date Value Ref Range Status   04/26/2024 22.4 (L) 37.5 - 51.0 % Final   04/25/2024 24.4 (L) 37.5 - 51.0 % Final   04/24/2024 30.9 (L) 37.5 - 51.0 % Final      Platlets No results found for: \"LABPLAT\"   MCV MCV   Date Value Ref Range Status   04/26/2024 98.2 (H) 79.0 - 97.0 fL Final   04/25/2024 97.2 (H) 79.0 - 97.0 fL Final   04/24/2024 96.6 79.0 - 97.0 fL Final          Sodium Sodium   Date Value Ref Range Status   04/26/2024 139 136 - 145 mmol/L Final   04/25/2024 137 136 - 145 mmol/L Final   04/25/2024 139 136 - 145 mmol/L Final   04/24/2024 140 136 - 145 mmol/L Final      Potassium Potassium   Date Value Ref Range Status   04/26/2024 3.1 (L) 3.5 - 5.2 mmol/L Final   04/25/2024 3.3 (L) 3.5 - 5.2 mmol/L Final   04/25/2024 3.2 (L) 3.5 - 5.2 mmol/L Final   04/24/2024 3.8 3.5 - 5.2 mmol/L Final      Chloride Chloride   Date Value Ref Range Status   04/26/2024 110 (H) 98 - 107 mmol/L Final   04/25/2024 110 (H) 98 - 107 mmol/L Final   04/25/2024 110 (H) 98 - 107 mmol/L Final   04/24/2024 108 (H) 98 - 107 mmol/L Final      CO2 CO2   Date Value Ref Range Status   04/26/2024 15.0 (L) 22.0 - 29.0 mmol/L Final   04/25/2024 15.0 (L) 22.0 - 29.0 mmol/L Final   04/25/2024 15.0 (L) 22.0 - 29.0 mmol/L Final   04/24/2024 16.0 (L) 22.0 - 29.0 mmol/L Final      BUN BUN   Date Value Ref Range Status   04/26/2024 21 8 - 23 mg/dL Final   04/25/2024 22 8 - 23 mg/dL Final   04/25/2024 22 8 - 23 mg/dL Final   04/24/2024 24 (H) 8 - 23 mg/dL Final    " "  Creatinine Creatinine   Date Value Ref Range Status   04/26/2024 2.85 (H) 0.76 - 1.27 mg/dL Final   04/25/2024 2.88 (H) 0.76 - 1.27 mg/dL Final   04/25/2024 3.22 (H) 0.76 - 1.27 mg/dL Final   04/24/2024 3.25 (H) 0.76 - 1.27 mg/dL Final      Calcium Calcium   Date Value Ref Range Status   04/26/2024 7.8 (L) 8.6 - 10.5 mg/dL Final   04/25/2024 7.8 (L) 8.6 - 10.5 mg/dL Final   04/25/2024 7.5 (L) 8.6 - 10.5 mg/dL Final   04/24/2024 9.0 8.6 - 10.5 mg/dL Final      PO4 No results found for: \"CAPO4\"   Albumin Albumin   Date Value Ref Range Status   04/25/2024 2.8 (L) 3.5 - 5.2 g/dL Final   04/24/2024 3.5 3.5 - 5.2 g/dL Final      Magnesium Magnesium   Date Value Ref Range Status   04/26/2024 1.3 (L) 1.6 - 2.4 mg/dL Final   04/25/2024 1.1 (L) 1.6 - 2.4 mg/dL Final      Uric Acid No results found for: \"URICACID\"     Medication Review: Yes    Assessment & Plan       Sepsis    Presence of cardiac pacemaker    Mixed hyperlipidemia    Tobacco abuse    Bronchogenic cancer of right lung    Malignant neoplasm of lower lobe of right lung    ARACELI (acute kidney injury)    Severe malnutrition    Leukocytosis      Assessment & Plan    1- ARACELI - non oliguric - baseline Scr 0.8- At discharge Scr 4.19 Peaked at 8.49 thought to be secondary to ATN and pyelonephritis associated injury - today 3.22.   2- HTN   3- Hypokalemia   4- Metabolic acidosis   5- Anemia   6- Leukocytosis   7- Small cell Cancer - Follows with Dr Gely Ashley      Plan:  - Start bicarb based fluids @ 75cc/hr.   - Add oral kCL 40 meq BID.  - Continue with midodrine   - Hold Antihypertensive meds.   - Monitor I/O   - Avoid nephrotoxic agents.   - Renal diet   - Adjust meds per renal function   - No emergent need of RRT   - Monitor H/H and transfuse for Hgb less than 7.0     Jeronimo Clarke MD  04/26/24  20:18 EDT          "

## 2024-04-27 NOTE — PROGRESS NOTES
" LOS: 3 days   Patient Care Team:  Shahid Drake MD as PCP - General (Family Medicine)  Octaviano Sampson MD as Consulting Physician (Pulmonary Disease)  Neetu Ashley MD as Referring Physician (Hematology and Oncology)  Nimo Rodríguez MD as Consulting Physician (Radiation Oncology)    Chief Complaint: acute kidney disease.     Subjective     Doing well otherwise. No active complaints. White count improving. Cr 2.6mg/dl good PO intake.     Subjective:  Symptoms:  Resolved.  No shortness of breath or chest pain.    Diet:  Adequate intake.        History taken from: patient    Objective     Vital Sign Min/Max for last 24 hours  Temp  Min: 97.8 °F (36.6 °C)  Max: 98.8 °F (37.1 °C)   BP  Min: 134/67  Max: 169/94   Pulse  Min: 70  Max: 80   Resp  Min: 16  Max: 18   SpO2  Min: 93 %  Max: 99 %   No data recorded   Weight  Min: 79.4 kg (175 lb)  Max: 79.4 kg (175 lb)     Flowsheet Rows      Flowsheet Row First Filed Value   Admission Height 182.9 cm (72\") Documented at 04/24/2024 0913   Admission Weight 73.5 kg (162 lb) Documented at 04/24/2024 0913            I/O this shift:  In: 720 [P.O.:720]  Out: 600 [Urine:600]  I/O last 3 completed shifts:  In: 300 [Blood:300]  Out: 5600 [Urine:5600]    Objective:  General Appearance:  Comfortable.    Vital signs: (most recent): Blood pressure 160/89, pulse 70, temperature 97.9 °F (36.6 °C), temperature source Oral, resp. rate 16, height 182.9 cm (72\"), weight 79.4 kg (175 lb), SpO2 93%.  Vital signs are normal.    Output: Producing urine.    HEENT: Normal HEENT exam.    Lungs:  Normal effort and normal respiratory rate.  Breath sounds clear to auscultation.  He is not in respiratory distress.  No decreased breath sounds or wheezes.    Heart: Normal rate.  Regular rhythm.    Abdomen: Abdomen is soft and non-distended.  There are no signs of ascites.    Neurological: Patient is alert and oriented to person, place and time.  Patient has normal reflexes and normal muscle tone.  " "  Pupils:  Pupils are equal, round, and reactive to light.    Skin:  Warm.                Results Review:     I reviewed the patient's new clinical results.    WBC WBC   Date Value Ref Range Status   04/27/2024 10.14 3.40 - 10.80 10*3/mm3 Final   04/26/2024 24.65 (H) 3.40 - 10.80 10*3/mm3 Final   04/25/2024 45.90 (C) 3.40 - 10.80 10*3/mm3 Final      HGB Hemoglobin   Date Value Ref Range Status   04/27/2024 9.3 (L) 13.0 - 17.7 g/dL Final   04/26/2024 8.8 (L) 13.0 - 17.7 g/dL Final   04/26/2024 7.0 (L) 13.0 - 17.7 g/dL Final   04/25/2024 7.8 (L) 13.0 - 17.7 g/dL Final      HCT Hematocrit   Date Value Ref Range Status   04/27/2024 28.8 (L) 37.5 - 51.0 % Final   04/26/2024 27.5 (L) 37.5 - 51.0 % Final   04/26/2024 22.4 (L) 37.5 - 51.0 % Final   04/25/2024 24.4 (L) 37.5 - 51.0 % Final      Platlets No results found for: \"LABPLAT\"   MCV MCV   Date Value Ref Range Status   04/27/2024 91.7 79.0 - 97.0 fL Final   04/26/2024 98.2 (H) 79.0 - 97.0 fL Final   04/25/2024 97.2 (H) 79.0 - 97.0 fL Final          Sodium Sodium   Date Value Ref Range Status   04/27/2024 143 136 - 145 mmol/L Final   04/26/2024 139 136 - 145 mmol/L Final   04/25/2024 137 136 - 145 mmol/L Final   04/25/2024 139 136 - 145 mmol/L Final      Potassium Potassium   Date Value Ref Range Status   04/27/2024 3.5 3.5 - 5.2 mmol/L Final   04/26/2024 3.1 (L) 3.5 - 5.2 mmol/L Final   04/25/2024 3.3 (L) 3.5 - 5.2 mmol/L Final   04/25/2024 3.2 (L) 3.5 - 5.2 mmol/L Final      Chloride Chloride   Date Value Ref Range Status   04/27/2024 114 (H) 98 - 107 mmol/L Final   04/26/2024 110 (H) 98 - 107 mmol/L Final   04/25/2024 110 (H) 98 - 107 mmol/L Final   04/25/2024 110 (H) 98 - 107 mmol/L Final      CO2 CO2   Date Value Ref Range Status   04/27/2024 17.0 (L) 22.0 - 29.0 mmol/L Final   04/26/2024 15.0 (L) 22.0 - 29.0 mmol/L Final   04/25/2024 15.0 (L) 22.0 - 29.0 mmol/L Final   04/25/2024 15.0 (L) 22.0 - 29.0 mmol/L Final      BUN BUN   Date Value Ref Range Status " "  04/27/2024 16 8 - 23 mg/dL Final   04/26/2024 21 8 - 23 mg/dL Final   04/25/2024 22 8 - 23 mg/dL Final   04/25/2024 22 8 - 23 mg/dL Final      Creatinine Creatinine   Date Value Ref Range Status   04/27/2024 2.68 (H) 0.76 - 1.27 mg/dL Final   04/26/2024 2.85 (H) 0.76 - 1.27 mg/dL Final   04/25/2024 2.88 (H) 0.76 - 1.27 mg/dL Final   04/25/2024 3.22 (H) 0.76 - 1.27 mg/dL Final      Calcium Calcium   Date Value Ref Range Status   04/27/2024 7.9 (L) 8.6 - 10.5 mg/dL Final   04/26/2024 7.8 (L) 8.6 - 10.5 mg/dL Final   04/25/2024 7.8 (L) 8.6 - 10.5 mg/dL Final   04/25/2024 7.5 (L) 8.6 - 10.5 mg/dL Final      PO4 No results found for: \"CAPO4\"   Albumin Albumin   Date Value Ref Range Status   04/25/2024 2.8 (L) 3.5 - 5.2 g/dL Final      Magnesium Magnesium   Date Value Ref Range Status   04/27/2024 1.3 (L) 1.6 - 2.4 mg/dL Final   04/26/2024 1.3 (L) 1.6 - 2.4 mg/dL Final   04/25/2024 1.1 (L) 1.6 - 2.4 mg/dL Final      Uric Acid No results found for: \"URICACID\"     Medication Review: Yes    Assessment & Plan       Sepsis    Presence of cardiac pacemaker    Mixed hyperlipidemia    Tobacco abuse    Bronchogenic cancer of right lung    Malignant neoplasm of lower lobe of right lung    ARACELI (acute kidney injury)    Severe malnutrition    Leukocytosis      Assessment & Plan    1- ARACELI - non oliguric - baseline Scr 0.8- At discharge Scr 4.19 Peaked at 8.49 thought to be secondary to ATN and pyelonephritis associated injury - today 3.22.   2- HTN   3- Hypokalemia   4- Metabolic acidosis   5- Anemia   6- Leukocytosis   7- Small cell Cancer - Follows with Dr Gely Ashley      Plan:  - Encourage oral hydration. Conservative care otherwise from renal standpoint.  - Continue oral kCL 40 meq BID x 1 day  - Hold Antihypertensive meds.   - Monitor I/O   - Avoid nephrotoxic agents.   - Renal diet   - Adjust meds per renal function   - No emergent need of RRT   - Monitor H/H and transfuse for Hgb less than 7.0     Jeronimo Clarke, " MD  04/27/24  14:22 EDT

## 2024-04-27 NOTE — PLAN OF CARE
Problem: Adult Inpatient Plan of Care  Goal: Plan of Care Review  Outcome: Ongoing, Progressing  Goal: Patient-Specific Goal (Individualized)  Outcome: Ongoing, Progressing  Goal: Absence of Hospital-Acquired Illness or Injury  Outcome: Ongoing, Progressing  Intervention: Identify and Manage Fall Risk  Description: Perform standard risk assessment on admission using a validated tool or comprehensive approach appropriate to the patient; reassess fall risk frequently, with change in status or transfer to another level of care.  Communicate fall injury risk to interprofessional healthcare team.  Determine need for increased observation, equipment and environmental modification, such as low bed, signage and supportive, nonskid footwear.  Adjust safety measures to individual developmental age, stage and identified risk factors.  Reinforce the importance of safety and physical activity with patient and family.  Perform regular intentional rounding to assess need for position change, pain assessment and personal needs, including assistance with toileting.  Recent Flowsheet Documentation  Taken 4/27/2024 0603 by Micaela Patel, RN  Safety Promotion/Fall Prevention:   activity supervised   assistive device/personal items within reach   clutter free environment maintained   fall prevention program maintained   lighting adjusted   mobility aid in reach   nonskid shoes/slippers when out of bed   safety round/check completed   room organization consistent  Taken 4/27/2024 0400 by Micaela Patel, RN  Safety Promotion/Fall Prevention:   activity supervised   assistive device/personal items within reach   clutter free environment maintained   fall prevention program maintained   lighting adjusted   mobility aid in reach   nonskid shoes/slippers when out of bed   room organization consistent   safety round/check completed  Taken 4/27/2024 0200 by Micaela Patel, RN  Safety Promotion/Fall Prevention:   activity supervised    assistive device/personal items within reach   clutter free environment maintained   fall prevention program maintained   lighting adjusted   mobility aid in reach   nonskid shoes/slippers when out of bed   room organization consistent   safety round/check completed  Taken 4/27/2024 0000 by Micaela Patel RN  Safety Promotion/Fall Prevention:   activity supervised   clutter free environment maintained   assistive device/personal items within reach   fall prevention program maintained   lighting adjusted   mobility aid in reach   nonskid shoes/slippers when out of bed   room organization consistent   safety round/check completed  Taken 4/26/2024 2200 by Micaela Patel RN  Safety Promotion/Fall Prevention:   activity supervised   assistive device/personal items within reach   clutter free environment maintained   fall prevention program maintained   lighting adjusted   mobility aid in reach   nonskid shoes/slippers when out of bed   room organization consistent   safety round/check completed  Taken 4/26/2024 2000 by Micaela Patel RN  Safety Promotion/Fall Prevention:   activity supervised   assistive device/personal items within reach   clutter free environment maintained   fall prevention program maintained   lighting adjusted   mobility aid in reach   nonskid shoes/slippers when out of bed   room organization consistent   safety round/check completed  Intervention: Prevent Skin Injury  Description: Perform a screening for skin injury risk, such as pressure or moisture associated skin damage on admission and at regular intervals throughout hospital stay.  Keep all areas of skin (especially folds) clean and dry.  Maintain adequate skin hydration.  Relieve and redistribute pressure and protect bony prominences; implement measures based on patient-specific risk factors.  Match turning and repositioning schedule to clinical condition.  Encourage weight shift frequently; assist with reposition if unable to complete  independently.  Float heels off bed; avoid pressure on the Achilles tendon.  Keep skin free from extended contact with medical devices.  Encourage functional activity and mobility, as early as tolerated.  Use aids (e.g., slide boards, mechanical lift) during transfer.  Recent Flowsheet Documentation  Taken 4/27/2024 0400 by Micaela Patel RN  Body Position: position changed independently  Taken 4/27/2024 0200 by Micaela Patel RN  Body Position: position changed independently  Taken 4/27/2024 0000 by Micaela Patel RN  Body Position: position changed independently  Taken 4/26/2024 2200 by Micaela Patel RN  Body Position: position changed independently  Taken 4/26/2024 2000 by Micaela Patel RN  Body Position: position changed independently  Intervention: Prevent and Manage VTE (Venous Thromboembolism) Risk  Description: Assess for VTE (venous thromboembolism) risk.  Encourage and assist with early ambulation.  Initiate and maintain compression or other therapy, as indicated, based on identified risk in accordance with organizational protocol and provider order.  Encourage both active and passive leg exercises while in bed, if unable to ambulate.  Recent Flowsheet Documentation  Taken 4/27/2024 0400 by Micaela Patel RN  Activity Management: activity encouraged  Taken 4/27/2024 0200 by Micaela Patel RN  Activity Management: activity encouraged  Taken 4/27/2024 0000 by Micaela Patel RN  Activity Management: activity encouraged  Taken 4/26/2024 2200 by Micaela Patel RN  Activity Management: activity encouraged  Taken 4/26/2024 2000 by Micaela Patel RN  Activity Management: activity encouraged  VTE Prevention/Management: other (see comments)  Intervention: Prevent Infection  Description: Maintain skin and mucous membrane integrity; promote hand, oral and pulmonary hygiene.  Optimize fluid balance, nutrition, sleep and glycemic control to maximize infection resistance.  Identify potential sources of  infection early to prevent or mitigate progression of infection (e.g., wound, lines, devices).  Evaluate ongoing need for invasive devices; remove promptly when no longer indicated.  Recent Flowsheet Documentation  Taken 4/26/2024 2000 by Micaela Patel RN  Infection Prevention:   environmental surveillance performed   equipment surfaces disinfected   hand hygiene promoted   personal protective equipment utilized   rest/sleep promoted  Goal: Optimal Comfort and Wellbeing  Outcome: Ongoing, Progressing  Intervention: Provide Person-Centered Care  Description: Use a family-focused approach to care.  Develop trust and rapport by proactively providing information, encouraging questions, addressing concerns and offering reassurance.  Acknowledge emotional response to hospitalization.  Recognize and utilize personal coping strategies.  Honor spiritual and cultural preferences.  Recent Flowsheet Documentation  Taken 4/26/2024 2000 by Micaela Patel RN  Trust Relationship/Rapport:   care explained   choices provided   emotional support provided   empathic listening provided   questions answered   questions encouraged   thoughts/feelings acknowledged  Goal: Readiness for Transition of Care  Outcome: Ongoing, Progressing     Problem: Pain Acute  Goal: Acceptable Pain Control and Functional Ability  Outcome: Ongoing, Progressing  Intervention: Prevent or Manage Pain  Description: Evaluate pain level, effect of treatment and patient response at regular intervals.  Minimize painful stimuli; coordinate care and adjust environment (e.g., light, noise, unnecessary movement); promote sleep/rest.  Match pharmacologic analgesia to severity and type of pain mechanism (e.g., neuropathic, muscle, inflammatory); consider multimodal approach (e.g., nonopioid, opioid, adjuvant).  Provide medication at regular intervals; titrate to patient response; premedicate for painful procedures.  Manage breakthrough pain with additional doses;  consider rotation or switching medication.  Monitor for signs of substance tolerance (increased dose to reach desired effect, decreased effect with same dose).  Manage medication-induced effects, such as constipation, nausea, pruritus, urinary retention, somnolence and dizziness.  Provide multimodal interventions, such as as physical activity, therapeutic exercise, yoga, TENS (transcutaneous electrical nerve stimulation) and manual therapy.  Train in functional activity modifications, such as body mechanics, posture, ergonomics, energy conservation and activity pacing.  Consider addition of complementary or alternative therapy, such as acupuncture, hypnosis or therapeutic touch.  Recent Flowsheet Documentation  Taken 4/27/2024 0603 by Micaela Patel RN  Medication Review/Management: medications reviewed  Taken 4/27/2024 0400 by Micaela Patel RN  Medication Review/Management: medications reviewed  Taken 4/27/2024 0200 by Micaela Patel RN  Medication Review/Management: medications reviewed  Taken 4/27/2024 0000 by Micaela Patel RN  Medication Review/Management: medications reviewed  Taken 4/26/2024 2200 by Micaela Patel RN  Medication Review/Management: medications reviewed  Taken 4/26/2024 2000 by Micaela Patel RN  Medication Review/Management: medications reviewed  Intervention: Optimize Psychosocial Wellbeing  Description: Facilitate patient’s self-control over pain by providing pain information and allowing choices in treatment.  Consider and address emotional response to pain.  Explore and promote use of coping strategies; address barriers to successful coping.  Evaluate and assist with psychosocial, cultural and spiritual factors impacting pain.  Modify pain perception using techniques, such as distraction, mindfulness, guided imagery, meditation or music.  Assess for risk factors for developing chronic pain, such as depression, fear, pain avoidance and pain catastrophizing.  Consider referral for  ongoing coping support, such as education, relaxation training and role of thoughts.  Recent Flowsheet Documentation  Taken 4/26/2024 2000 by Micaela Patel RN  Diversional Activities: television     Problem: Infection  Goal: Absence of Infection Signs and Symptoms  Outcome: Ongoing, Progressing     Problem: Fall Injury Risk  Goal: Absence of Fall and Fall-Related Injury  Outcome: Ongoing, Progressing  Intervention: Identify and Manage Contributors  Description: Develop a fall prevention plan with the patient and caregiver/family.  Provide reorientation, appropriate sensory stimulation and routines with changes in mental status to decrease risk of fall.  Promote use of personal vision and auditory aids.  Assess assistance level required for safe and effective self-care; provide support as needed, such as toileting, mobilization. For age 65 and older, implement timed toileting with assistance.  Encourage physical activity, such as performance of mobility and self-care at highest level of patient ability, multicomponent exercise program and provision of appropriate assistive devices.  If fall occurs, assess the severity of injury; implement fall injury protocol. Determine the cause and revise fall injury prevention plan.  Regularly review medication contribution to fall risk; adjust medication administration times to minimize risk of falling.  Consider risk related to polypharmacy and age.  Balance adequate pain management with potential for oversedation.  Recent Flowsheet Documentation  Taken 4/27/2024 0603 by Micaela Patel RN  Medication Review/Management: medications reviewed  Taken 4/27/2024 0400 by Micaela Patel RN  Medication Review/Management: medications reviewed  Taken 4/27/2024 0200 by Micaela Patel RN  Medication Review/Management: medications reviewed  Taken 4/27/2024 0000 by Micaela Patel RN  Medication Review/Management: medications reviewed  Taken 4/26/2024 2200 by Micaela Patel  RN  Medication Review/Management: medications reviewed  Taken 4/26/2024 2000 by Micaela Patel RN  Medication Review/Management: medications reviewed  Intervention: Promote Injury-Free Environment  Description: Provide a safe, barrier-free environment that encourages independent activity.  Keep care area uncluttered and well-lighted.  Determine need for increased observation or monitoring.  Avoid use of devices that minimize mobility, such as restraints or indwelling urinary catheter.  Recent Flowsheet Documentation  Taken 4/27/2024 0603 by Micaela Patel RN  Safety Promotion/Fall Prevention:   activity supervised   assistive device/personal items within reach   clutter free environment maintained   fall prevention program maintained   lighting adjusted   mobility aid in reach   nonskid shoes/slippers when out of bed   safety round/check completed   room organization consistent  Taken 4/27/2024 0400 by Micaela Patel RN  Safety Promotion/Fall Prevention:   activity supervised   assistive device/personal items within reach   clutter free environment maintained   fall prevention program maintained   lighting adjusted   mobility aid in reach   nonskid shoes/slippers when out of bed   room organization consistent   safety round/check completed  Taken 4/27/2024 0200 by Micaela Patel RN  Safety Promotion/Fall Prevention:   activity supervised   assistive device/personal items within reach   clutter free environment maintained   fall prevention program maintained   lighting adjusted   mobility aid in reach   nonskid shoes/slippers when out of bed   room organization consistent   safety round/check completed  Taken 4/27/2024 0000 by Micaela Patel RN  Safety Promotion/Fall Prevention:   activity supervised   clutter free environment maintained   assistive device/personal items within reach   fall prevention program maintained   lighting adjusted   mobility aid in reach   nonskid shoes/slippers when out of bed   room  organization consistent   safety round/check completed  Taken 4/26/2024 2200 by Micaela Patel, RN  Safety Promotion/Fall Prevention:   activity supervised   assistive device/personal items within reach   clutter free environment maintained   fall prevention program maintained   lighting adjusted   mobility aid in reach   nonskid shoes/slippers when out of bed   room organization consistent   safety round/check completed  Taken 4/26/2024 2000 by Micaela Patel, RN  Safety Promotion/Fall Prevention:   activity supervised   assistive device/personal items within reach   clutter free environment maintained   fall prevention program maintained   lighting adjusted   mobility aid in reach   nonskid shoes/slippers when out of bed   room organization consistent   safety round/check completed     Problem: Adjustment to Illness (Sepsis/Septic Shock)  Goal: Optimal Coping  Outcome: Ongoing, Progressing     Problem: Bleeding (Sepsis/Septic Shock)  Goal: Absence of Bleeding  Outcome: Ongoing, Progressing     Problem: Glycemic Control Impaired (Sepsis/Septic Shock)  Goal: Blood Glucose Level Within Desired Range  Outcome: Ongoing, Progressing     Problem: Infection Progression (Sepsis/Septic Shock)  Goal: Absence of Infection Signs and Symptoms  Outcome: Ongoing, Progressing  Intervention: Initiate Sepsis Management  Description: Provide fluid therapy, such as crystalloid or albumin, to increase intravascular volume, organ perfusion and oxygen delivery.  Provide respiratory support, such as oxygen therapy, noninvasive or invasive positive pressure ventilation, to achieve oxygenation and ventilation goal; avoid hyperoxemia.  Obtain cultures prior to initiating antimicrobial therapy when possible. Do not delay for laboratory results in the presence of high suspicion or clinical indicators.  Administer intravenous broad-spectrum antimicrobial therapy promptly.  Implement hemodynamic monitoring to guide intravascular support based on  individual targeted parameters.  Determine and address underlying source of infection aggressively; implement transmission-based precautions and isolation, as indicated.  Recent Flowsheet Documentation  Taken 4/26/2024 2000 by Micaela Patel RN  Infection Prevention:   environmental surveillance performed   equipment surfaces disinfected   hand hygiene promoted   personal protective equipment utilized   rest/sleep promoted  Intervention: Promote Recovery  Description: Encourage pulmonary hygiene, such as cough-enhancement and airway-clearance techniques, that may include use of incentive spirometry, deep breathing and cough.  Encourage early rehabilitation and physical activity to optimize functional ability and activity tolerance, as well as minimize delirium.  Promote energy conservation; minimize oxygen demand and consumption by adjusting environment, decreasing stimulation, maintaining normothermia and treating pain.  Optimize fluid balance, nutrition intake, sleep and glycemic control to maintain tissue perfusion and enhance immune response.  Recent Flowsheet Documentation  Taken 4/27/2024 0400 by Micaela Patel RN  Activity Management: activity encouraged  Taken 4/27/2024 0200 by Micaela Patel RN  Activity Management: activity encouraged  Taken 4/27/2024 0000 by Micaela Patel RN  Activity Management: activity encouraged  Taken 4/26/2024 2200 by Micaela Patel RN  Activity Management: activity encouraged  Taken 4/26/2024 2000 by Micaela Patel RN  Activity Management: activity encouraged     Problem: Nutrition Impaired (Sepsis/Septic Shock)  Goal: Optimal Nutrition Intake  Outcome: Ongoing, Progressing   Goal Outcome Evaluation:

## 2024-04-27 NOTE — PLAN OF CARE
Goal Outcome Evaluation:  Plan of Care Reviewed With: patient, significant other        Progress: no change         Problem: Adult Inpatient Plan of Care  Goal: Plan of Care Review  Outcome: Ongoing, Progressing  Flowsheets (Taken 4/27/2024 1530)  Plan of Care Reviewed With:   patient   significant other  Goal: Patient-Specific Goal (Individualized)  Outcome: Ongoing, Progressing  Goal: Absence of Hospital-Acquired Illness or Injury  Outcome: Ongoing, Progressing  Intervention: Identify and Manage Fall Risk  Recent Flowsheet Documentation  Taken 4/27/2024 1202 by Lenore Poole RN  Safety Promotion/Fall Prevention:   assistive device/personal items within reach   clutter free environment maintained   nonskid shoes/slippers when out of bed   safety round/check completed  Taken 4/27/2024 0853 by Lenore Poole RN  Safety Promotion/Fall Prevention:   clutter free environment maintained   assistive device/personal items within reach   room organization consistent   nonskid shoes/slippers when out of bed  Intervention: Prevent Skin Injury  Recent Flowsheet Documentation  Taken 4/27/2024 1202 by Lenore Poole RN  Body Position: position changed independently  Skin Protection:   transparent dressing maintained   tubing/devices free from skin contact  Taken 4/27/2024 0853 by Lenore Poole RN  Skin Protection:   tubing/devices free from skin contact   transparent dressing maintained  Intervention: Prevent and Manage VTE (Venous Thromboembolism) Risk  Recent Flowsheet Documentation  Taken 4/27/2024 1202 by Lenore Poole RN  Activity Management: activity encouraged  Taken 4/27/2024 0853 by Lenore Poole RN  Activity Management: activity encouraged  Goal: Optimal Comfort and Wellbeing  Outcome: Ongoing, Progressing  Intervention: Provide Person-Centered Care  Recent Flowsheet Documentation  Taken 4/27/2024 0853 by Lenore Poole RN  Trust Relationship/Rapport:   care explained   thoughts/feelings acknowledged  Goal: Readiness for  Transition of Care  Outcome: Ongoing, Progressing     Problem: Pain Acute  Goal: Acceptable Pain Control and Functional Ability  Outcome: Ongoing, Progressing  Intervention: Prevent or Manage Pain  Recent Flowsheet Documentation  Taken 4/27/2024 0853 by Lenore Poole RN  Sensory Stimulation Regulation: television on  Sleep/Rest Enhancement:   therapeutic touch utilized   noise level reduced  Medication Review/Management: medications reviewed  Intervention: Optimize Psychosocial Wellbeing  Recent Flowsheet Documentation  Taken 4/27/2024 0853 by Lenore Poole RN  Supportive Measures:   active listening utilized   verbalization of feelings encouraged  Diversional Activities: television  Spiritual Activities Assistance: affirmation provided     Problem: Infection  Goal: Absence of Infection Signs and Symptoms  Outcome: Ongoing, Progressing  Intervention: Prevent or Manage Infection  Recent Flowsheet Documentation  Taken 4/27/2024 0853 by Lenore Poole RN  Fever Reduction/Comfort Measures:   lightweight bedding   lightweight clothing     Problem: Fall Injury Risk  Goal: Absence of Fall and Fall-Related Injury  Outcome: Ongoing, Progressing  Intervention: Identify and Manage Contributors  Recent Flowsheet Documentation  Taken 4/27/2024 0853 by Lenore Poole RN  Medication Review/Management: medications reviewed  Self-Care Promotion:   independence encouraged   safe use of adaptive equipment encouraged  Intervention: Promote Injury-Free Environment  Recent Flowsheet Documentation  Taken 4/27/2024 1202 by Lenore Poole RN  Safety Promotion/Fall Prevention:   assistive device/personal items within reach   clutter free environment maintained   nonskid shoes/slippers when out of bed   safety round/check completed  Taken 4/27/2024 0853 by Lenore Poole RN  Safety Promotion/Fall Prevention:   clutter free environment maintained   assistive device/personal items within reach   room organization consistent   nonskid shoes/slippers when  out of bed     Problem: Adjustment to Illness (Sepsis/Septic Shock)  Goal: Optimal Coping  Outcome: Ongoing, Progressing  Intervention: Optimize Psychosocial Adjustment to Illness  Recent Flowsheet Documentation  Taken 4/27/2024 0853 by Lenore Poole RN  Supportive Measures:   active listening utilized   verbalization of feelings encouraged  Family/Support System Care: self-care encouraged     Problem: Bleeding (Sepsis/Septic Shock)  Goal: Absence of Bleeding  Outcome: Ongoing, Progressing     Problem: Glycemic Control Impaired (Sepsis/Septic Shock)  Goal: Blood Glucose Level Within Desired Range  Outcome: Ongoing, Progressing  Intervention: Optimize Glycemic Control  Recent Flowsheet Documentation  Taken 4/27/2024 0853 by Lenore Poole RN  Glycemic Management: oral hydration promoted     Problem: Infection Progression (Sepsis/Septic Shock)  Goal: Absence of Infection Signs and Symptoms  Outcome: Ongoing, Progressing  Intervention: Promote Recovery  Recent Flowsheet Documentation  Taken 4/27/2024 1202 by Lenore Poole RN  Activity Management: activity encouraged  Taken 4/27/2024 0853 by Lenore Poole RN  Activity Management: activity encouraged  Airway/Ventilation Support: pulmonary hygiene promoted  Sleep/Rest Enhancement:   therapeutic touch utilized   noise level reduced  Intervention: Promote Stabilization  Recent Flowsheet Documentation  Taken 4/27/2024 0853 by Lenore Poole RN  Fever Reduction/Comfort Measures:   lightweight bedding   lightweight clothing     Problem: Nutrition Impaired (Sepsis/Septic Shock)  Goal: Optimal Nutrition Intake  Outcome: Ongoing, Progressing

## 2024-04-28 LAB
ACTH PLAS-MCNC: 28.1 PG/ML (ref 7.2–63.3)
ANION GAP SERPL CALCULATED.3IONS-SCNC: 10 MMOL/L (ref 5–15)
BASOPHILS # BLD AUTO: 0.05 10*3/MM3 (ref 0–0.2)
BASOPHILS NFR BLD AUTO: 0.6 % (ref 0–1.5)
BUN SERPL-MCNC: 13 MG/DL (ref 8–23)
BUN/CREAT SERPL: 5.5 (ref 7–25)
CALCIUM SPEC-SCNC: 8.2 MG/DL (ref 8.6–10.5)
CHLORIDE SERPL-SCNC: 111 MMOL/L (ref 98–107)
CO2 SERPL-SCNC: 23 MMOL/L (ref 22–29)
CREAT SERPL-MCNC: 2.35 MG/DL (ref 0.76–1.27)
DEPRECATED RDW RBC AUTO: 56 FL (ref 37–54)
EGFRCR SERPLBLD CKD-EPI 2021: 28.2 ML/MIN/1.73
EOSINOPHIL # BLD AUTO: 0.34 10*3/MM3 (ref 0–0.4)
EOSINOPHIL NFR BLD AUTO: 3.9 % (ref 0.3–6.2)
ERYTHROCYTE [DISTWIDTH] IN BLOOD BY AUTOMATED COUNT: 17.2 % (ref 12.3–15.4)
GLUCOSE SERPL-MCNC: 103 MG/DL (ref 65–99)
HCT VFR BLD AUTO: 28.4 % (ref 37.5–51)
HGB BLD-MCNC: 9.6 G/DL (ref 13–17.7)
IMM GRANULOCYTES # BLD AUTO: 0.05 10*3/MM3 (ref 0–0.05)
IMM GRANULOCYTES NFR BLD AUTO: 0.6 % (ref 0–0.5)
LYMPHOCYTES # BLD AUTO: 1.82 10*3/MM3 (ref 0.7–3.1)
LYMPHOCYTES NFR BLD AUTO: 20.9 % (ref 19.6–45.3)
MAGNESIUM SERPL-MCNC: 1.4 MG/DL (ref 1.6–2.4)
MCH RBC QN AUTO: 29.9 PG (ref 26.6–33)
MCHC RBC AUTO-ENTMCNC: 33.8 G/DL (ref 31.5–35.7)
MCV RBC AUTO: 88.5 FL (ref 79–97)
MONOCYTES # BLD AUTO: 0.93 10*3/MM3 (ref 0.1–0.9)
MONOCYTES NFR BLD AUTO: 10.7 % (ref 5–12)
NEUTROPHILS NFR BLD AUTO: 5.52 10*3/MM3 (ref 1.7–7)
NEUTROPHILS NFR BLD AUTO: 63.3 % (ref 42.7–76)
NRBC BLD AUTO-RTO: 0 /100 WBC (ref 0–0.2)
PHOSPHATE SERPL-MCNC: 1.7 MG/DL (ref 2.5–4.5)
PHOSPHATE SERPL-MCNC: 1.8 MG/DL (ref 2.5–4.5)
PLATELET # BLD AUTO: 243 10*3/MM3 (ref 140–450)
PMV BLD AUTO: 10.3 FL (ref 6–12)
POTASSIUM SERPL-SCNC: 3.6 MMOL/L (ref 3.5–5.2)
RBC # BLD AUTO: 3.21 10*6/MM3 (ref 4.14–5.8)
SODIUM SERPL-SCNC: 144 MMOL/L (ref 136–145)
WBC NRBC COR # BLD AUTO: 8.71 10*3/MM3 (ref 3.4–10.8)

## 2024-04-28 PROCEDURE — 85060 BLOOD SMEAR INTERPRETATION: CPT | Performed by: INTERNAL MEDICINE

## 2024-04-28 PROCEDURE — 25810000003 SODIUM CHLORIDE 0.9 % SOLUTION: Performed by: INTERNAL MEDICINE

## 2024-04-28 PROCEDURE — 25010000002 HEPARIN (PORCINE) PER 1000 UNITS: Performed by: INTERNAL MEDICINE

## 2024-04-28 PROCEDURE — 85025 COMPLETE CBC W/AUTO DIFF WBC: CPT | Performed by: INTERNAL MEDICINE

## 2024-04-28 PROCEDURE — 84100 ASSAY OF PHOSPHORUS: CPT | Performed by: INTERNAL MEDICINE

## 2024-04-28 PROCEDURE — 99232 SBSQ HOSP IP/OBS MODERATE 35: CPT | Performed by: INTERNAL MEDICINE

## 2024-04-28 PROCEDURE — 80048 BASIC METABOLIC PNL TOTAL CA: CPT | Performed by: INTERNAL MEDICINE

## 2024-04-28 PROCEDURE — 94799 UNLISTED PULMONARY SVC/PX: CPT

## 2024-04-28 PROCEDURE — 83735 ASSAY OF MAGNESIUM: CPT | Performed by: INTERNAL MEDICINE

## 2024-04-28 PROCEDURE — 25010000002 CEFEPIME PER 500 MG: Performed by: PHYSICIAN ASSISTANT

## 2024-04-28 RX ORDER — SODIUM CHLORIDE 9 MG/ML
75 INJECTION, SOLUTION INTRAVENOUS CONTINUOUS
Status: DISCONTINUED | OUTPATIENT
Start: 2024-04-28 | End: 2024-04-29 | Stop reason: HOSPADM

## 2024-04-28 RX ADMIN — Medication 1 CAPSULE: at 08:02

## 2024-04-28 RX ADMIN — CEFEPIME 2000 MG: 2 INJECTION, POWDER, FOR SOLUTION INTRAVENOUS at 17:17

## 2024-04-28 RX ADMIN — POTASSIUM CHLORIDE 40 MEQ: 750 CAPSULE, EXTENDED RELEASE ORAL at 08:02

## 2024-04-28 RX ADMIN — HEPARIN SODIUM 5000 UNITS: 5000 INJECTION INTRAVENOUS; SUBCUTANEOUS at 20:49

## 2024-04-28 RX ADMIN — FLUTICASONE PROPIONATE 2 SPRAY: 50 SPRAY, METERED NASAL at 08:02

## 2024-04-28 RX ADMIN — Medication 10 ML: at 08:03

## 2024-04-28 RX ADMIN — PRAVASTATIN SODIUM 80 MG: 40 TABLET ORAL at 20:49

## 2024-04-28 RX ADMIN — PANTOPRAZOLE SODIUM 40 MG: 40 TABLET, DELAYED RELEASE ORAL at 06:29

## 2024-04-28 RX ADMIN — POTASSIUM & SODIUM PHOSPHATES POWDER PACK 280-160-250 MG 1 PACKET: 280-160-250 PACK at 17:17

## 2024-04-28 RX ADMIN — POTASSIUM & SODIUM PHOSPHATES POWDER PACK 280-160-250 MG 1 PACKET: 280-160-250 PACK at 11:15

## 2024-04-28 RX ADMIN — TIOTROPIUM BROMIDE INHALATION SPRAY 2 PUFF: 3.12 SPRAY, METERED RESPIRATORY (INHALATION) at 09:39

## 2024-04-28 RX ADMIN — BUDESONIDE AND FORMOTEROL FUMARATE DIHYDRATE 2 PUFF: 160; 4.5 AEROSOL RESPIRATORY (INHALATION) at 20:12

## 2024-04-28 RX ADMIN — FERROUS SULFATE TAB 325 MG (65 MG ELEMENTAL FE) 325 MG: 325 (65 FE) TAB at 08:02

## 2024-04-28 RX ADMIN — BUDESONIDE AND FORMOTEROL FUMARATE DIHYDRATE 2 PUFF: 160; 4.5 AEROSOL RESPIRATORY (INHALATION) at 09:39

## 2024-04-28 RX ADMIN — POTASSIUM CHLORIDE 40 MEQ: 750 CAPSULE, EXTENDED RELEASE ORAL at 17:17

## 2024-04-28 RX ADMIN — SODIUM CHLORIDE 75 ML/HR: 9 INJECTION, SOLUTION INTRAVENOUS at 17:18

## 2024-04-28 RX ADMIN — MAGNESIUM OXIDE TAB 400 MG (241.3 MG ELEMENTAL MG) 400 MG: 400 (241.3 MG) TAB at 09:25

## 2024-04-28 RX ADMIN — POTASSIUM & SODIUM PHOSPHATES POWDER PACK 280-160-250 MG 2 PACKET: 280-160-250 PACK at 08:02

## 2024-04-28 NOTE — PROGRESS NOTES
INFECTIOUS DISEASE PROGRESS NOTE    David Barfield  1950  [id number]    Date of consult: 4/25/24    Admit date: 4/24/2024    Requesting Provider: Dr. Mitchell  Evaluating physician: THIEN Granados  Reason for Consultation: sepsis; recent pyelonephritis   Chief Complaint: Weakness, nausea/Vomiting      Subjective   History of present illness:  David Barfield is a  74 y.o.  Yr old male, with PMH NSCLC/RLL lobectomy (1/2024) recently on Opdivo (last dose on 4/4/24), HTN, and emphysema who I followed during a recent admission where he presented with nausea, vomiting, diarrhea, low back pain, and weakness.  The patient was noted to have bilateral perinephric stranding on CT scan but urine culture was no growth.  He did have pyuria and urinalysis.  He suffered from acute kidney injury with a creatinine that trended over 8 but was subsequently improving prior to discharge.  His C. Difficile test was negative.  diarrhea subsequently improved.  He received about 10 days of IV antibiotic therapy for  pyelonephritis with clinical improvement in his white blood cell count trended down.  He was discharged home on 4/22.    The patient presented back to the ER for vague abdominal pain, nausea, vomiting, and weakness.  He had initially done okay after his discharge and was eating and drinking okay but woke up with vague abdominal pain down both sides of his abdomen and started having some nausea and vomiting. No diarrhea. His wife was concerned that he would get worse due to dehydration incision called EMS and the patient was transported to the ER.    Since arrival, the patient has remained afebrile. White blood cell count significantly elevated at 39.14 with 91.2% neutrophils.  Lactic acid was 1.6 on arrival.  Pro-calcitonin was 0.48.  Lipase is 23.  LFTs were within normal limits.  Creatinine was 3.25, down from 4.19 at time of discharge.  Urinalysis showed 2+ blood, 2+ protein, 1+ leukocyte esterase, negative  nitrite, 6-10 RBCs, 11-20 WBCs, and no bacteria.  Urine culture is in progress.  Respiratory PCR panel was negative.  Blood cultures are in progress. CT chest showed stable postsurgical changes of the right lower lung with scarring and chronic appearing loculated right pleural effusion.  He did have mild emphysema with biapical scarring.  CT abdomen and pelvis showed no new significant abnormality.  Per radiology, the patient had a significant decrease in perinephric fat stranding about the bilateral kidneys and decreased bladder wall thickening compared to his CT from last admission.  The patient was started on empiric vancomycin and Zosyn by the primary team.  ID has been consult and for recommendations due to concern for sepsis and in the setting of his recent pyelonephritis.    Subjective:    4/26/24: The patient is feeling somewhat better today. No fevers. Nausea and emesis has improved. Diarrhea improved. No abdominal pain. No new rashes. No fevers. Leukocytosis improving. The patient does state that he has some pain in his perineum when he is bearing down to have a bowel movement and his wife questions if he could have prostatitis.    4/28/24: Afebrile.  Creatinine improved to 2.35.  WBC much improved and now 8.7. Blood cultures remain negative to date. He is feeling better.  He denies f/c, soa, n/v/d, rashes.  He is not sleeping well because of how late the antibiotic is being infused.    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    Diabetes mellitus Borderline    Emphysema/COPD     Erectile disorder     GERD (gastroesophageal reflux disease)     History of chemotherapy     Hyperlipidemia     Hypertension 2019    Lung nodule     Mumps     Mumps     Pruritus     after bath    Slow to wake up after anesthesia     Wears dentures     upper only    Wears hearing aid in both ears     usually only  "wears right       Past Surgical History:   Procedure Laterality Date    BONE BIOPSY      broken bone surgery in his face    BRONCHOSCOPY THORACOTOMY Right 1/9/2024    Procedure: THORACOTOMY FOR LOWER LOBECTOMY AND MEDISTINAL LYMPH NODE DISSECTION RIGHT;  Surgeon: Joey Patel MD;  Location: [facility] OR;  Service: Cardiothoracic;  Laterality: Right;    BRONCHOSCOPY WITH ION ROBOTIC ASSIST N/A 09/15/2023    Procedure: BRONCHOSCOPY NAVIGATION WITH ENDOBRONCHIAL ULTRASOUND AND ION ROBOT;  Surgeon: Octaviano Sampson MD;  Location: [facility] ENDOSCOPY;  Service: Robotics - Pulmonary;  Laterality: N/A;  ion #6 - 0032  - 0015  Cath guide 0061    EBUS balloon removed and intact    CARDIAC ELECTROPHYSIOLOGY PROCEDURE N/A 08/17/2021    Procedure: Pacemaker DC new;  Surgeon: Kayy Box MD;  Location: [facility] CATH INVASIVE LOCATION;  Service: Cardiology;  Laterality: N/A;    FACIAL FRACTURE SURGERY      PACEMAKER IMPLANTATION         Pediatric History   Patient Parents    Not on file     Other Topics Concern    Not on file   Social History Narrative    Lives in [place], [place]       family history includes Aneurysm in his mother; Dementia in his father; Heart disease in his paternal grandmother; Hypertension in his paternal grandfather; Leukemia in his sister.    Allergies   Allergen Reactions    Cymbalta [Duloxetine Hcl] GI Intolerance    Gabapentin Mental Status Change     Pt states that this medication \"makes him feel foolish in his head\".     Remeron [Mirtazapine] Other (See Comments)     Excess sedation    Toradol [Ketorolac Tromethamine] GI Intolerance     Projectile vomiting     Latex Other (See Comments)     Latex allergy     Tape Rash       Immunization History   Administered Date(s) Administered    ABRYSVO (RSV, 60+ or pregnant women 32-36 wks) 10/16/2023    COVID-19 (PFIZER) BIVALENT 12+YRS 09/16/2022    COVID-19 (PFIZER) Purple Cap Monovalent 01/29/2021, 02/19/2021, 10/18/2021, 04/14/2022    COVID-19 " F23 (PFIZER) 12YRS+ (COMIRNATY) 09/26/2023    Fluzone High-Dose 65+yrs 10/06/2023    Pneumococcal Conjugate 20-Valent (PCV20) 10/11/2023       Medication:    Current Facility-Administered Medications:     acetaminophen (TYLENOL) tablet 650 mg, 650 mg, Oral, Q4H PRN, 650 mg at 04/26/24 1223 **OR** acetaminophen (TYLENOL) 160 MG/5ML oral solution 650 mg, 650 mg, Oral, Q4H PRN **OR** acetaminophen (TYLENOL) suppository 650 mg, 650 mg, Rectal, Q4H PRN, Mary Mitchell DO    budesonide-formoterol (SYMBICORT) 160-4.5 MCG/ACT inhaler 2 puff, 2 puff, Inhalation, BID - RT, 2 puff at 04/28/24 0939 **AND** tiotropium (SPIRIVA RESPIMAT) 2.5 mcg/act aerosol solution inhaler, 2 puff, Inhalation, Daily - RT, Mary Mitchell DO, 2 puff at 04/28/24 0939    calcium carbonate (TUMS) chewable tablet 500 mg (200 mg elemental), 2 tablet, Oral, BID PRN, Mary Mitchell DO    cefepime 2000 mg IVPB in 100 mL NS (MBP), 2,000 mg, Intravenous, Q24H, Derian Barry MD, 2,000 mg at 04/27/24 2056    ferrous sulfate tablet 325 mg, 325 mg, Oral, Daily With Breakfast, Mary Mitchell DO, 325 mg at 04/28/24 0802    fluticasone (FLONASE) 50 MCG/ACT nasal spray 2 spray, 2 spray, Each Nare, Daily, Mary Mitchell DO, 2 spray at 04/28/24 0802    heparin (porcine) 5000 UNIT/ML injection 5,000 Units, 5,000 Units, Subcutaneous, Q12H, Mary Mitchell DO, 5,000 Units at 04/27/24 2056    HYDROcodone-acetaminophen (NORCO) 7.5-325 MG per tablet 1 tablet, 1 tablet, Oral, Q6H PRN, Mary Mitchell DO, 1 tablet at 04/27/24 2315    lactobacillus acidophilus (RISAQUAD) capsule 1 capsule, 1 capsule, Oral, Daily, Derian Barry MD, 1 capsule at 04/28/24 0802    magnesium oxide (MAG-OX) tablet 400 mg, 400 mg, Oral, Daily, Bharath Jimenez MD, 400 mg at 04/28/24 0925    nitroglycerin (NITROSTAT) SL tablet 0.4 mg, 0.4 mg, Sublingual, Q5 Min PRN, Mary Mitchell DO    ondansetron ODT (ZOFRAN-ODT) disintegrating tablet 4 mg, 4 mg, Oral, Q6H PRN **OR**  "ondansetron (ZOFRAN) injection 4 mg, 4 mg, Intravenous, Q6H PRN, Mary Mitchell DO, 4 mg at 04/25/24 0440    pantoprazole (PROTONIX) EC tablet 40 mg, 40 mg, Oral, Q AM, Mary Mitchell DO, 40 mg at 04/28/24 0629    Phosphorus Replacement - Follow Nurse / BPA Driven Protocol, , Does not apply, PRN, Kishor Sanderson PA-C    potassium & sodium phosphates (PHOS-NAK) 280-160-250 MG packet 1 packet, 1 packet, Oral, TID AC, Bharath Jimenez MD, 1 packet at 04/28/24 1115    potassium chloride (MICRO-K/KLOR-CON) CR capsule, 40 mEq, Oral, BID With Meals, Jeronimo Clarke MD, 40 mEq at 04/28/24 0802    pravastatin (PRAVACHOL) tablet 80 mg, 80 mg, Oral, Nightly, Mary Mitchell DO, 80 mg at 04/26/24 2116    sodium bicarbonate 8.4 % 150 mEq in dextrose (D5W) 5 % 1,000 mL infusion (greater than 100 mEq), 150 mEq, Intravenous, Continuous, Jeronimo Clarke MD, Last Rate: 75 mL/hr at 04/27/24 2320, 150 mEq at 04/27/24 2320    sodium chloride 0.9 % flush 10 mL, 10 mL, Intravenous, Q12H, Mary Mitchell DO, 10 mL at 04/28/24 0803    sodium chloride 0.9 % flush 10 mL, 10 mL, Intravenous, PRN, Mary Mitchell,     sodium chloride 0.9 % infusion 40 mL, 40 mL, Intravenous, PRN, Mary Mitchell DO    Please refer to the medical record for a full medication list    Review of Systems:    As above    Physical Exam:   Vital Signs   Temp:  [96.9 °F (36.1 °C)-98.9 °F (37.2 °C)] 98.1 °F (36.7 °C)  Heart Rate:  [70-81] 74  Resp:  [16-18] 16  BP: (127-171)/() 151/91    Temp  Min: 96.9 °F (36.1 °C)  Max: 98.9 °F (37.2 °C)  BP  Min: 127/89  Max: 171/96  Pulse  Min: 70  Max: 81  Resp  Min: 16  Max: 18  SpO2  Min: 94 %  Max: 99 %    Blood pressure 151/91, pulse 74, temperature 98.1 °F (36.7 °C), temperature source Oral, resp. rate 16, height 182.9 cm (72\"), weight 79.4 kg (175 lb), SpO2 99%.  GENERAL: Awake and alert, in no acute distress. Appears stated age.  Frail  HEENT:  Normocephalic, atraumatic. No external oral lesions noted  EYES: " PERRL. No conjunctival injection. No icterus.   HEART: RRR, no murmur  LUNGS: CTA B.  ABDOMEN: Soft, nontender, nondistended.  MSK: No joint effusions noted.  Full range of motion throughout.  No tenderness to palpation over his spine.  No CVA tenderness bilaterally.  GENITAL: no Yeung catheter  SKIN: No new rashes noted  PSYCHIATRIC: cooperative.  Appropriate mood and affect  EXT:  No cellulitic change.  NEURO: awake alert and oriented ×4.  Normal speech and cognition    Left-sided Mediport site is without any erythema or drainage    Right-sided AICD pocket site is without any erythema or tenderness    Results Review:   I reviewed the patient's new clinical results.  I reviewed the patient's new imaging results and agree with the interpretation.  I reviewed the patient's other test results and agree with the interpretation    Results from last 7 days   Lab Units 04/28/24  0303 04/27/24  0410 04/26/24 2057 04/26/24  0407   WBC 10*3/mm3 8.71 10.14  --  24.65*   HEMOGLOBIN g/dL 9.6* 9.3* 8.8* 7.0*   HEMATOCRIT % 28.4* 28.8* 27.5* 22.4*   PLATELETS 10*3/mm3 243 305  --  265     Results from last 7 days   Lab Units 04/28/24  0303   SODIUM mmol/L 144   POTASSIUM mmol/L 3.6   CHLORIDE mmol/L 111*   CO2 mmol/L 23.0   BUN mg/dL 13   CREATININE mg/dL 2.35*   GLUCOSE mg/dL 103*   CALCIUM mg/dL 8.2*     Results from last 7 days   Lab Units 04/25/24  0727   ALK PHOS U/L 76   BILIRUBIN mg/dL 0.3   ALT (SGPT) U/L 10   AST (SGOT) U/L 12             Results from last 7 days   Lab Units 04/25/24  0727   VANCOMYCIN RM mcg/mL 10.50     Results from last 7 days   Lab Units 04/24/24  0922   LACTATE mmol/L 1.6     Estimated Creatinine Clearance: 30.5 mL/min (A) (by C-G formula based on SCr of 2.35 mg/dL (H)).  CPK          4/25/2024    07:27   Common Labs   Creatine Kinase 19       Procalitonin Results:      Lab 04/24/24  0922   PROCALCITONIN 0.48*      Brief Urine Lab Results  (Last result in the past 365 days)        Color   Clarity    "Blood   Leuk Est   Nitrite   Protein   CREAT   Urine HCG        04/24/24 1016 Yellow   Clear   Moderate (2+)   Small (1+)   Negative   100 mg/dL (2+)                  No results found for: \"SITE\", \"ALLENTEST\", \"PHART\", \"VGI1ZOM\", \"PO2ART\", \"AXV0RPF\", \"BASEEXCESS\", \"Z8RBEVKR\", \"HGBBG\", \"HCTABG\", \"OXYHEMOGLOBI\", \"METHHGBN\", \"CARBOXYHGB\", \"CO2CT\", \"BAROMETRIC\", \"MODALITY\", \"FIO2\"       Microbiology:  Urine culture: negative.    Blood culture ×2: In progress-negative to date.     Karius test pending.   GI panel PCR negative.   Cdiff PCR negative.       Radiology:  Imaging Results (Last 72 Hours)       ** No results found for the last 72 hours. **            IMPRESSION:     Problems:  Severe leukocytosis with neutrophilia-Unclear etiology.  No diarrhea.  Could be having recurrent urinary infection/pyelonephritis although fat stranding around his kidneys appears improved on repeat CT abdomen and pelvis.  He does have a Mediport and a AICD in place.  He has a chronic appearing right-sided loculated pleural effusion at the area of scarring from his prior surgery. Unclear if he could have prostatitis given his pain in his perineum when he has bearing down for bowel movement. Blood cultures are No growth to date.  We will continue to monitor on broad-spectrum antimicrobial coverage.   Much improved.   Nausea and vomiting, improved  Diarrhea-C. Difficile and GI panel PCR tests were negative  Recent bilateral pyelonephritis  Pyuria/possible UTI.  Cx negative.   Recent acute renal failure-improving.  Creatinine has trended down to 3.25, from 4.19 at time of discharge on 4/22. Now 2.35 on 4/28.   NSCLC/RLL lobectomy (1/2024)  Normocytic anemia-Improved from time of discharge  Emphysema    RECOMMENDATIONS:    -Continue to follow CBC and CMP closely. Monitor CPK level at least weekly while on daptomycin  -Continue to monitor urine culture and blood cultures closely-negative to date.   -GI PCR panel was negative  -TTE in order to " help rule out infective endocarditis/lead infection especially given his AICD in place.--thickening of aortic valves but no evidence of vegetations.  -IV fluids and antiemetics prn per primary team  -continue cefepime 2 g IV every 24 hours--moved dosing from 2100 to 1800 to see if he can sleep better without having Cefepime IV being infused while he is trying to sleep.   -Sent Karius test as etiology of recurrent sepsis remains unclear--pending.  -Could consider urology evaluation for possible prostatitis    I will be off over the weekend.  I will have someone check on the patient for me    Thank you for asking me to see David Barfield.  Our group would be pleased to follow this patient over the course of their hospitalization and assist with outpatient antimicrobial therapy, as indicated.  Further recommendations depend on the results of the cultures and clinical course.    Complex MDM    Copied text in this note has been reviewed and is accurate as of 04/28/24    PA saw and examined patient today.  Panchito Shepard PA-C  [facility]      THIEN Granados  4/28/2024

## 2024-04-28 NOTE — PLAN OF CARE
Goal Outcome Evaluation:  Plan of Care Reviewed With: patient      Progress: improving       Problem: Adult Inpatient Plan of Care  Goal: Absence of Hospital-Acquired Illness or Injury  Intervention: Identify and Manage Fall Risk  Recent Flowsheet Documentation  Taken 4/28/2024 0400 by Josy Irvin RN  Safety Promotion/Fall Prevention: activity supervised  Taken 4/27/2024 2000 by Josy Irvin RN  Safety Promotion/Fall Prevention: activity supervised  Intervention: Prevent Skin Injury  Recent Flowsheet Documentation  Taken 4/28/2024 0400 by Josy Irvin RN  Body Position: position changed independently  Taken 4/27/2024 2000 by Josy Irvin RN  Body Position: position changed independently  Intervention: Prevent and Manage VTE (Venous Thromboembolism) Risk  Recent Flowsheet Documentation  Taken 4/28/2024 0400 by Josy Irvin RN  Activity Management: ambulated in room  Taken 4/27/2024 2000 by Josy Irvin RN  Activity Management: ambulated in room     Problem: Adult Inpatient Plan of Care  Goal: Absence of Hospital-Acquired Illness or Injury  Intervention: Prevent Skin Injury  Recent Flowsheet Documentation  Taken 4/28/2024 0400 by Josy Irvin RN  Body Position: position changed independently  Taken 4/27/2024 2000 by Josy Irvin RN  Body Position: position changed independently     Problem: Adult Inpatient Plan of Care  Goal: Absence of Hospital-Acquired Illness or Injury  Intervention: Prevent and Manage VTE (Venous Thromboembolism) Risk  Recent Flowsheet Documentation  Taken 4/28/2024 0400 by Josy Irvin RN  Activity Management: ambulated in room  Taken 4/27/2024 2000 by Josy Irvin RN  Activity Management: ambulated in room

## 2024-04-28 NOTE — PROGRESS NOTES
[facility] Medicine Services  PROGRESS NOTE    Patient Name: David Barfield  : 1950  MRN: [id number]    Date of Admission: 2024  Primary Care Physician: Shahid Drake MD    Subjective   Subjective     CC:  N/V, weakness    HPI:  Resting in bed no acute distress.  Tells earlier this morning had some abdominal shooting pain, thought that this is secondary to bicarb drip and asked the nurses to stop it.  Currently he is fine and does not have any specific complaint.  Patient's wife is also present at the bedside.  Denies any fever or chills.  No chest pain or palpitation or shortness of breath at rest.  No nausea or vomiting or diarrhea.      Objective   Objective     Vital Signs:   Temp:  [98.1 °F (36.7 °C)-98.9 °F (37.2 °C)] 98.1 °F (36.7 °C)  Heart Rate:  [70-74] 74  Resp:  [16-18] 16  BP: (150-171)/() 151/91     Physical Exam:  Constitutional: No acute distress, awake, alert  HENT: NCAT, mucous membranes moist  Respiratory: Clear to auscultation bilaterally, respiratory effort normal   Cardiovascular: RRR, no murmurs, rubs, or gallops  Gastrointestinal: Positive bowel sounds, soft, nontender, nondistended  Musculoskeletal: No bilateral ankle edema  Psychiatric: Appropriate affect, cooperative  Neurologic: Oriented x 3, strength symmetric in all extremities, Cranial Nerves grossly intact to confrontation, speech clear  Skin: No rashes     Results Reviewed:  LAB RESULTS:      Lab 24  0303 24  0410 247 24  0407 24  0727 24  0922   WBC 8.71 10.14  --  24.65* 45.90* 39.14*   HEMOGLOBIN 9.6* 9.3* 8.8* 7.0* 7.8* 9.9*   HEMATOCRIT 28.4* 28.8* 27.5* 22.4* 24.4* 30.9*   PLATELETS 243 305  --  265 287 384   NEUTROS ABS 5.52 6.67  --  21.27* 41.95* 35.71*   IMMATURE GRANS (ABS) 0.05 0.09*  --  0.56* 0.77* 0.38*   LYMPHS ABS 1.82 1.45  --  0.96 1.22 0.89   MONOS ABS 0.93* 1.52*  --  1.54* 1.83* 2.01*   EOS ABS 0.34 0.36  --  0.27 0.03  0.04   MCV 88.5 91.7  --  98.2* 97.2* 96.6   PROCALCITONIN  --   --   --   --   --  0.48*   LACTATE  --   --   --   --   --  1.6   LDH  --   --   --  118*  --   --          Lab 04/28/24  1422 04/28/24  0303 04/27/24  0410 04/26/24  0407 04/25/24 2114 04/25/24 0727 04/24/24 0922 04/22/24 0715   SODIUM  --  144 143 139 137 139   < > 142   POTASSIUM  --  3.6 3.5 3.1* 3.3* 3.2*   < > 4.0   CHLORIDE  --  111* 114* 110* 110* 110*   < > 108*   CO2  --  23.0 17.0* 15.0* 15.0* 15.0*   < > 19.0*   ANION GAP  --  10.0 12.0 14.0 12.0 14.0   < > 15.0   BUN  --  13 16 21 22 22   < > 26*   CREATININE  --  2.35* 2.68* 2.85* 2.88* 3.22*   < > 4.19*   EGFR  --  28.2* 24.0* 22.3* 22.1* 19.3*   < > 14.1*   GLUCOSE  --  103* 99 95 97 93   < > 101*   CALCIUM  --  8.2* 7.9* 7.8* 7.8* 7.5*   < > 8.7   MAGNESIUM  --  1.4* 1.3* 1.3*  --  1.1*  --   --    PHOSPHORUS 1.8* 1.7*  --  3.0  --   --   --  4.5   TSH  --   --   --  0.736  --   --   --   --     < > = values in this interval not displayed.         Lab 04/25/24 0727 04/24/24 0922 04/22/24 0715   TOTAL PROTEIN 5.8* 8.5  --    ALBUMIN 2.8* 3.5 3.3*   GLOBULIN 3.0 5.0  --    ALT (SGPT) 10 18  --    AST (SGOT) 12 25  --    BILIRUBIN 0.3 0.3  --    ALK PHOS 76 90  --    LIPASE  --  23  --          Lab 04/24/24  1125 04/24/24  0922   HSTROP T 26* 29*             Lab 04/26/24  0852   ABO TYPING A   RH TYPING Negative   ANTIBODY SCREEN Negative         Lab 04/25/24  0908   PH, ARTERIAL 7.362   PCO2, ARTERIAL 25.1*   PO2 .0*   FIO2 21   HCO3 ART 14.2*   BASE EXCESS ART -10.1*   CARBOXYHEMOGLOBIN 1.6     Brief Urine Lab Results  (Last result in the past 365 days)        Color   Clarity   Blood   Leuk Est   Nitrite   Protein   CREAT   Urine HCG        04/24/24 1016 Yellow   Clear   Moderate (2+)   Small (1+)   Negative   100 mg/dL (2+)                   Microbiology Results Abnormal       Procedure Component Value - Date/Time    Blood Culture - Blood, Arm, Right [[id number]]  (Normal)  Collected: 04/24/24 1049    Lab Status: Preliminary result Specimen: Blood from Arm, Right Updated: 04/28/24 1130     Blood Culture No growth at 4 days    Narrative:      Less than seven (7) mL's of blood was collected.  Insufficient quantity may yield false negative results.    Blood Culture - Blood, Arm, Left [[id number]]  (Normal) Collected: 04/24/24 1100    Lab Status: Preliminary result Specimen: Blood from Arm, Left Updated: 04/28/24 1130     Blood Culture No growth at 4 days    Narrative:      Less than seven (7) mL's of blood was collected.  Insufficient quantity may yield false negative results.    Gastrointestinal Panel, PCR - Stool, Per Rectum [[id number]]  (Normal) Collected: 04/25/24 2026    Lab Status: Final result Specimen: Stool from Per Rectum Updated: 04/26/24 0742     Campylobacter Not Detected     Plesiomonas shigelloides Not Detected     Salmonella Not Detected     Vibrio Not Detected     Vibrio cholerae Not Detected     Yersinia enterocolitica Not Detected     Enteroaggregative E. coli (EAEC) Not Detected     Enteropathogenic E. coli (EPEC) Not Detected     Enterotoxigenic E. coli (ETEC) lt/st Not Detected     Shiga-like toxin-producing E. coli (STEC) stx1/stx2 Not Detected     Shigella/Enteroinvasive E. coli (EIEC) Not Detected     Cryptosporidium Not Detected     Cyclospora cayetanensis Not Detected     Entamoeba histolytica Not Detected     Giardia lamblia Not Detected     Adenovirus F40/41 Not Detected     Astrovirus Not Detected     Norovirus GI/GII Not Detected     Rotavirus A Not Detected     Sapovirus (I, II, IV or V) Not Detected    MRSA Screen, PCR (Inpatient) - Swab, Nares [[id number]]  (Normal) Collected: 04/25/24 1542    Lab Status: Final result Specimen: Swab from Nares Updated: 04/25/24 1716     MRSA PCR Negative    Narrative:      The negative predictive value of this diagnostic test is high and should only be used to consider de-escalating anti-MRSA therapy. A positive result may  indicate colonization with MRSA and must be correlated clinically.  MRSA Negative    Clostridioides difficile Toxin - Stool, Per Rectum [[id number]]  (Normal) Collected: 04/25/24 1335    Lab Status: Final result Specimen: Stool from Per Rectum Updated: 04/25/24 1448    Narrative:      The following orders were created for panel order Clostridioides difficile Toxin - Stool, Per Rectum.  Procedure                               Abnormality         Status                     ---------                               -----------         ------                     Clostridioides difficile...[[id number]]  Normal              Final result                 Please view results for these tests on the individual orders.    Clostridioides difficile Toxin, PCR - Stool, Per Rectum [[id number]]  (Normal) Collected: 04/25/24 1335    Lab Status: Final result Specimen: Stool from Per Rectum Updated: 04/25/24 1448     Toxigenic C. difficile by PCR Not Detected    Narrative:      The result indicates the absence of toxigenic C. difficile from stool specimen.     Urine Culture - Urine, Urine, Clean Catch [[id number]]  (Normal) Collected: 04/24/24 1016    Lab Status: Final result Specimen: Urine, Clean Catch Updated: 04/25/24 1023     Urine Culture No growth    Respiratory Panel PCR w/COVID-19(SARS-CoV-2) [facility] In-House, NP Swab in UTM/VTM, 2 HR TAT - Swab, Nasopharynx [[id number]]  (Normal) Collected: 04/24/24 1016    Lab Status: Final result Specimen: Swab from Nasopharynx Updated: 04/24/24 1115     ADENOVIRUS, PCR Not Detected     Coronavirus 229E Not Detected     Coronavirus HKU1 Not Detected     Coronavirus NL63 Not Detected     Coronavirus OC43 Not Detected     COVID19 Not Detected     Human Metapneumovirus Not Detected     Human Rhinovirus/Enterovirus Not Detected     Influenza A PCR Not Detected     Influenza B PCR Not Detected     Parainfluenza Virus 1 Not Detected     Parainfluenza Virus 2 Not Detected      Parainfluenza Virus 3 Not Detected     Parainfluenza Virus 4 Not Detected     RSV, PCR Not Detected     Bordetella pertussis pcr Not Detected     Bordetella parapertussis PCR Not Detected     Chlamydophila pneumoniae PCR Not Detected     Mycoplasma pneumo by PCR Not Detected    Narrative:      In the setting of a positive respiratory panel with a viral infection PLUS a negative procalcitonin without other underlying concern for bacterial infection, consider observing off antibiotics or discontinuation of antibiotics and continue supportive care. If the respiratory panel is positive for atypical bacterial infection (Bordetella pertussis, Chlamydophila pneumoniae, or Mycoplasma pneumoniae), consider antibiotic de-escalation to target atypical bacterial infection.            Adult Transthoracic Echo Complete W/ Cont if Necessary Per Protocol    Result Date: 4/27/2024    Left ventricular ejection fraction appears to be 61 - 65%.   Left ventricular wall thickness is consistent with mild concentric hypertroph   There is calcification and thickening of the aortic valve.  No independently mobile vegetations visualized.   Mild aortic valve regurgitation is present   Mild dilation of the aortic root is present.  Measures 4.0 cm.   There are pacemaker leads noted in the right atrium/right ventricle.   Mild tricuspid valve regurgitation is present. Estimated right ventricular systolic pressure from tricuspid regurgitation is normal (<35 mmHg).   Mild mitral valve regurgitation is present      Results for orders placed during the hospital encounter of 04/24/24    Adult Transthoracic Echo Complete W/ Cont if Necessary Per Protocol    Interpretation Summary    Left ventricular ejection fraction appears to be 61 - 65%.    Left ventricular wall thickness is consistent with mild concentric hypertroph    There is calcification and thickening of the aortic valve.  No independently mobile vegetations visualized.    Mild aortic valve  regurgitation is present    Mild dilation of the aortic root is present.  Measures 4.0 cm.    There are pacemaker leads noted in the right atrium/right ventricle.    Mild tricuspid valve regurgitation is present. Estimated right ventricular systolic pressure from tricuspid regurgitation is normal (<35 mmHg).    Mild mitral valve regurgitation is present      Current medications:  Scheduled Meds:budesonide-formoterol, 2 puff, Inhalation, BID - RT   And  tiotropium bromide monohydrate, 2 puff, Inhalation, Daily - RT  cefepime, 2,000 mg, Intravenous, Q24H  ferrous sulfate, 325 mg, Oral, Daily With Breakfast  fluticasone, 2 spray, Each Nare, Daily  heparin (porcine), 5,000 Units, Subcutaneous, Q12H  lactobacillus acidophilus, 1 capsule, Oral, Daily  magnesium oxide, 400 mg, Oral, Daily  pantoprazole, 40 mg, Oral, Q AM  potassium & sodium phosphates, 1 packet, Oral, TID AC  potassium chloride, 40 mEq, Oral, BID With Meals  pravastatin, 80 mg, Oral, Nightly  sodium chloride, 10 mL, Intravenous, Q12H      Continuous Infusions:sodium bicarbonate 8.4 % 150 mEq in dextrose (D5W) 5 % 1,000 mL infusion (greater than 100 mEq), 150 mEq, Last Rate: 150 mEq (04/27/24 2320)      PRN Meds:.  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        Brief Hospital Course to date:  David Barfield is a 74 y.o. male  with history of non-small cell lung ca s/p neoadjuvant chemoimmunotherapy and RLL lobectomy currently on Opdivo (last 4/4/24), HTN, emphysema, presence of port and pacemaker, current tobacco use, recent admission (4/12-4/22)  for sepsis related to pyelonephritis who presented back with vague abd pain, nausea/vomiting and weakness. Initial labs with WBC 39, procal 0.48, Cr 3.25. CT chest with no acute findings. CT abd/pel with no new findings, improved perinephric fat stranding about the bilateral kidneys.      Leukocytosis  Sepsis with no known source of infection  - Last admission, Ucx and Bcx negative  - Completed 10 days of cefepime -- WBC up to 46 but normalized during admission. WBC 10.87 on 4/20 and 39 on admission   - Bcx pending, Ucx pending; resp PCR negative   - zosyn/vanc, will continue Zosyn for now although there is no strong evidence of infectious process.  - IVF   -Patient denies fever.  Patient's wife was present at the bedside reports that they have measured temperature up to 90s.  She thinks that this is fever however patient denies feeling febrile.  -WBC has normalized.  Patient remains afebrile.     ARACELI on CKD 3  Metabolic acidosis  - ARACELI on last admission. Neph followed at that time  - Baseline Cr 0.9-1.1. Peaked to 8.45 last admission  - Cr continues to improve.   - IVF     HTN   -Blood pressure was on the low side.  Was started on midodrine and IV fluid.  Blood pressures has improved.  Will stop midodrine and monitor.   -As blood pressure has increased we will restart amlodipine.    Non-small cell lung cancer  - Follows with Dr. Ashley. Last Opdivo treatment 4/4   -Will ask oncology to see the patient.     Anemia  - Improved. Follow up outpatient with hematology   -Transfuse 1 unit of packed red blood cell and monitor.    Plan:  -Continue current care  -Monitor labs  -Patient has  improved significantly.  Can go home when okay with ID on oral antibiotics or continuation of IV antibiotic.  ID recommendation.      Expected Discharge Location and Transportation: Home  Expected Discharge to be determined  Expected discharge date/ time has not been documented.     DVT prophylaxis:  Medical DVT prophylaxis orders are present.         AM-PAC 6 Clicks Score (PT): 24 (04/28/24 0800)    CODE STATUS:   Code Status and Medical Interventions:   Ordered at: 04/24/24 1519     Code Status (Patient has no pulse and is not breathing):    CPR (Attempt to Resuscitate)     Medical Interventions (Patient has pulse or is breathing):    Full Support       Bharath Jimenez MD  04/28/24

## 2024-04-28 NOTE — PROGRESS NOTES
" LOS: 4 days   Patient Care Team:  Shahid Drake MD as PCP - General (Family Medicine)  Octaviano Sampson MD as Consulting Physician (Pulmonary Disease)  Neetu Ashley MD as Referring Physician (Hematology and Oncology)  Nimo Rodríguez MD as Consulting Physician (Radiation Oncology)    Chief Complaint: acute kidney disease.     Subjective     Doing well otherwise. No active complaints. White count improving. Cr ~ 2.3mg/dl. Doing well otherwise.     Subjective:  Symptoms:  Resolved.  No shortness of breath, chest pain or diarrhea.    Diet:  Adequate intake.        History taken from: patient    Objective     Vital Sign Min/Max for last 24 hours  Temp  Min: 98.1 °F (36.7 °C)  Max: 98.9 °F (37.2 °C)   BP  Min: 150/95  Max: 171/96   Pulse  Min: 70  Max: 74   Resp  Min: 16  Max: 18   SpO2  Min: 94 %  Max: 99 %   No data recorded   No data recorded     Flowsheet Rows      Flowsheet Row First Filed Value   Admission Height 182.9 cm (72\") Documented at 04/24/2024 0913   Admission Weight 73.5 kg (162 lb) Documented at 04/24/2024 0913            I/O this shift:  In: -   Out: 1650 [Urine:1650]  I/O last 3 completed shifts:  In: 1357 [P.O.:720; I.V.:637]  Out: 6150 [Urine:6150]    Objective:  General Appearance:  Comfortable.    Vital signs: (most recent): Blood pressure 151/91, pulse 74, temperature 98.1 °F (36.7 °C), temperature source Oral, resp. rate 16, height 182.9 cm (72\"), weight 79.4 kg (175 lb), SpO2 99%.  Vital signs are normal.    Output: Producing urine.    HEENT: Normal HEENT exam.    Lungs:  Normal effort and normal respiratory rate.  Breath sounds clear to auscultation.  He is not in respiratory distress.  No decreased breath sounds or wheezes.    Heart: Normal rate.  Regular rhythm.    Abdomen: Abdomen is soft and non-distended.  There are no signs of ascites.    Neurological: Patient is alert and oriented to person, place and time.  Patient has normal reflexes and normal muscle tone.    Pupils:  Pupils " "are equal, round, and reactive to light.    Skin:  Warm.                Results Review:     I reviewed the patient's new clinical results.    WBC WBC   Date Value Ref Range Status   04/28/2024 8.71 3.40 - 10.80 10*3/mm3 Final   04/27/2024 10.14 3.40 - 10.80 10*3/mm3 Final   04/26/2024 24.65 (H) 3.40 - 10.80 10*3/mm3 Final      HGB Hemoglobin   Date Value Ref Range Status   04/28/2024 9.6 (L) 13.0 - 17.7 g/dL Final   04/27/2024 9.3 (L) 13.0 - 17.7 g/dL Final   04/26/2024 8.8 (L) 13.0 - 17.7 g/dL Final   04/26/2024 7.0 (L) 13.0 - 17.7 g/dL Final      HCT Hematocrit   Date Value Ref Range Status   04/28/2024 28.4 (L) 37.5 - 51.0 % Final   04/27/2024 28.8 (L) 37.5 - 51.0 % Final   04/26/2024 27.5 (L) 37.5 - 51.0 % Final   04/26/2024 22.4 (L) 37.5 - 51.0 % Final      Platlets No results found for: \"LABPLAT\"   MCV MCV   Date Value Ref Range Status   04/28/2024 88.5 79.0 - 97.0 fL Final   04/27/2024 91.7 79.0 - 97.0 fL Final   04/26/2024 98.2 (H) 79.0 - 97.0 fL Final          Sodium Sodium   Date Value Ref Range Status   04/28/2024 144 136 - 145 mmol/L Final   04/27/2024 143 136 - 145 mmol/L Final   04/26/2024 139 136 - 145 mmol/L Final   04/25/2024 137 136 - 145 mmol/L Final      Potassium Potassium   Date Value Ref Range Status   04/28/2024 3.6 3.5 - 5.2 mmol/L Final   04/27/2024 3.5 3.5 - 5.2 mmol/L Final   04/26/2024 3.1 (L) 3.5 - 5.2 mmol/L Final   04/25/2024 3.3 (L) 3.5 - 5.2 mmol/L Final      Chloride Chloride   Date Value Ref Range Status   04/28/2024 111 (H) 98 - 107 mmol/L Final   04/27/2024 114 (H) 98 - 107 mmol/L Final   04/26/2024 110 (H) 98 - 107 mmol/L Final   04/25/2024 110 (H) 98 - 107 mmol/L Final      CO2 CO2   Date Value Ref Range Status   04/28/2024 23.0 22.0 - 29.0 mmol/L Final   04/27/2024 17.0 (L) 22.0 - 29.0 mmol/L Final   04/26/2024 15.0 (L) 22.0 - 29.0 mmol/L Final   04/25/2024 15.0 (L) 22.0 - 29.0 mmol/L Final      BUN BUN   Date Value Ref Range Status   04/28/2024 13 8 - 23 mg/dL Final " "  04/27/2024 16 8 - 23 mg/dL Final   04/26/2024 21 8 - 23 mg/dL Final   04/25/2024 22 8 - 23 mg/dL Final      Creatinine Creatinine   Date Value Ref Range Status   04/28/2024 2.35 (H) 0.76 - 1.27 mg/dL Final   04/27/2024 2.68 (H) 0.76 - 1.27 mg/dL Final   04/26/2024 2.85 (H) 0.76 - 1.27 mg/dL Final   04/25/2024 2.88 (H) 0.76 - 1.27 mg/dL Final      Calcium Calcium   Date Value Ref Range Status   04/28/2024 8.2 (L) 8.6 - 10.5 mg/dL Final   04/27/2024 7.9 (L) 8.6 - 10.5 mg/dL Final   04/26/2024 7.8 (L) 8.6 - 10.5 mg/dL Final   04/25/2024 7.8 (L) 8.6 - 10.5 mg/dL Final      PO4 No results found for: \"CAPO4\"   Albumin No results found for: \"ALBUMIN\"     Magnesium Magnesium   Date Value Ref Range Status   04/28/2024 1.4 (L) 1.6 - 2.4 mg/dL Final   04/27/2024 1.3 (L) 1.6 - 2.4 mg/dL Final   04/26/2024 1.3 (L) 1.6 - 2.4 mg/dL Final      Uric Acid No results found for: \"URICACID\"     Medication Review: Yes    Assessment & Plan       Sepsis    Presence of cardiac pacemaker    Mixed hyperlipidemia    Tobacco abuse    Bronchogenic cancer of right lung    Malignant neoplasm of lower lobe of right lung    ARACELI (acute kidney injury)    Severe malnutrition    Leukocytosis      Assessment & Plan    1- ARACELI - non oliguric - baseline Scr 0.8- At discharge Scr 4.19 Peaked at 8.49 thought to be secondary to ATN and pyelonephritis associated injury - Renal function continues to improve spontaneously.   2- HTN   3- Hypokalemia   4- Metabolic acidosis   5- Anemia   6- Leukocytosis   7- Small cell Cancer - Follows with Dr Gely Aslhey   8- Diarrhea: Presenting complain improved   9- Hypophosphatemia: Getting phos supp       Plan:  - Encourage oral hydration. Conservative care otherwise from renal standpoint.  - Monitor I/O   - Avoid nephrotoxic agents.   - Renal diet   - Adjust meds per renal function   - No emergent need of RRT   - Monitor H/H and transfuse for Hgb less than 7.0     Jeronimo Clarke MD  04/28/24  17:18 EDT          "

## 2024-04-29 ENCOUNTER — TELEPHONE (OUTPATIENT)
Dept: ONCOLOGY | Facility: CLINIC | Age: 74
End: 2024-04-29
Payer: MEDICARE

## 2024-04-29 ENCOUNTER — READMISSION MANAGEMENT (OUTPATIENT)
Dept: CALL CENTER | Facility: HOSPITAL | Age: 74
End: 2024-04-29
Payer: MEDICARE

## 2024-04-29 VITALS
DIASTOLIC BLOOD PRESSURE: 83 MMHG | TEMPERATURE: 98 F | HEART RATE: 95 BPM | OXYGEN SATURATION: 98 % | WEIGHT: 175 LBS | RESPIRATION RATE: 18 BRPM | HEIGHT: 72 IN | SYSTOLIC BLOOD PRESSURE: 144 MMHG | BODY MASS INDEX: 23.7 KG/M2

## 2024-04-29 PROBLEM — A41.9 SEPSIS: Status: RESOLVED | Noted: 2024-04-12 | Resolved: 2024-04-29

## 2024-04-29 PROBLEM — D72.829 LEUKOCYTOSIS: Status: RESOLVED | Noted: 2024-04-24 | Resolved: 2024-04-29

## 2024-04-29 LAB
ANION GAP SERPL CALCULATED.3IONS-SCNC: 11 MMOL/L (ref 5–15)
BACTERIA SPEC AEROBE CULT: NORMAL
BACTERIA SPEC AEROBE CULT: NORMAL
BASOPHILS # BLD AUTO: 0.08 10*3/MM3 (ref 0–0.2)
BASOPHILS NFR BLD AUTO: 0.9 % (ref 0–1.5)
BUN SERPL-MCNC: 12 MG/DL (ref 8–23)
BUN/CREAT SERPL: 5.9 (ref 7–25)
CALCIUM SPEC-SCNC: 8.6 MG/DL (ref 8.6–10.5)
CHLORIDE SERPL-SCNC: 111 MMOL/L (ref 98–107)
CO2 SERPL-SCNC: 21 MMOL/L (ref 22–29)
CREAT SERPL-MCNC: 2.03 MG/DL (ref 0.76–1.27)
CYTOLOGIST CVX/VAG CYTO: NORMAL
DEPRECATED RDW RBC AUTO: 55.8 FL (ref 37–54)
EGFRCR SERPLBLD CKD-EPI 2021: 33.6 ML/MIN/1.73
EOSINOPHIL # BLD AUTO: 0.35 10*3/MM3 (ref 0–0.4)
EOSINOPHIL NFR BLD AUTO: 3.7 % (ref 0.3–6.2)
ERYTHROCYTE [DISTWIDTH] IN BLOOD BY AUTOMATED COUNT: 16.7 % (ref 12.3–15.4)
GLUCOSE SERPL-MCNC: 124 MG/DL (ref 65–99)
HCT VFR BLD AUTO: 30.1 % (ref 37.5–51)
HGB BLD-MCNC: 9.7 G/DL (ref 13–17.7)
IMM GRANULOCYTES # BLD AUTO: 0.07 10*3/MM3 (ref 0–0.05)
IMM GRANULOCYTES NFR BLD AUTO: 0.7 % (ref 0–0.5)
LYMPHOCYTES # BLD AUTO: 1.54 10*3/MM3 (ref 0.7–3.1)
LYMPHOCYTES NFR BLD AUTO: 16.4 % (ref 19.6–45.3)
MAGNESIUM SERPL-MCNC: 1.4 MG/DL (ref 1.6–2.4)
MCH RBC QN AUTO: 29.3 PG (ref 26.6–33)
MCHC RBC AUTO-ENTMCNC: 32.2 G/DL (ref 31.5–35.7)
MCV RBC AUTO: 90.9 FL (ref 79–97)
MONOCYTES # BLD AUTO: 0.99 10*3/MM3 (ref 0.1–0.9)
MONOCYTES NFR BLD AUTO: 10.5 % (ref 5–12)
NEUTROPHILS NFR BLD AUTO: 6.38 10*3/MM3 (ref 1.7–7)
NEUTROPHILS NFR BLD AUTO: 67.8 % (ref 42.7–76)
NRBC BLD AUTO-RTO: 0 /100 WBC (ref 0–0.2)
PATH INTERP BLD-IMP: NORMAL
PHOSPHATE SERPL-MCNC: 2.3 MG/DL (ref 2.5–4.5)
PLATELET # BLD AUTO: 244 10*3/MM3 (ref 140–450)
PMV BLD AUTO: 10.2 FL (ref 6–12)
POTASSIUM SERPL-SCNC: 3.9 MMOL/L (ref 3.5–5.2)
RBC # BLD AUTO: 3.31 10*6/MM3 (ref 4.14–5.8)
REF LAB TEST METHOD: NORMAL
SODIUM SERPL-SCNC: 143 MMOL/L (ref 136–145)
WBC NRBC COR # BLD AUTO: 9.41 10*3/MM3 (ref 3.4–10.8)

## 2024-04-29 PROCEDURE — 25010000002 CEFEPIME PER 500 MG: Performed by: FAMILY MEDICINE

## 2024-04-29 PROCEDURE — 84100 ASSAY OF PHOSPHORUS: CPT | Performed by: INTERNAL MEDICINE

## 2024-04-29 PROCEDURE — 94761 N-INVAS EAR/PLS OXIMETRY MLT: CPT

## 2024-04-29 PROCEDURE — 94799 UNLISTED PULMONARY SVC/PX: CPT

## 2024-04-29 PROCEDURE — 94664 DEMO&/EVAL PT USE INHALER: CPT

## 2024-04-29 PROCEDURE — 99239 HOSP IP/OBS DSCHRG MGMT >30: CPT | Performed by: FAMILY MEDICINE

## 2024-04-29 PROCEDURE — 83735 ASSAY OF MAGNESIUM: CPT | Performed by: INTERNAL MEDICINE

## 2024-04-29 PROCEDURE — 80048 BASIC METABOLIC PNL TOTAL CA: CPT | Performed by: INTERNAL MEDICINE

## 2024-04-29 PROCEDURE — 25010000002 HEPARIN (PORCINE) PER 1000 UNITS: Performed by: INTERNAL MEDICINE

## 2024-04-29 PROCEDURE — 85025 COMPLETE CBC W/AUTO DIFF WBC: CPT | Performed by: INTERNAL MEDICINE

## 2024-04-29 RX ORDER — DIAZEPAM 2 MG/1
2 TABLET ORAL EVERY 6 HOURS PRN
Status: DISCONTINUED | OUTPATIENT
Start: 2024-04-29 | End: 2024-04-29 | Stop reason: HOSPADM

## 2024-04-29 RX ORDER — LEVOFLOXACIN 750 MG/1
750 TABLET, FILM COATED ORAL DAILY
Qty: 1 TABLET | Refills: 0 | Status: SHIPPED | OUTPATIENT
Start: 2024-04-30 | End: 2024-05-12 | Stop reason: HOSPADM

## 2024-04-29 RX ORDER — LEVOFLOXACIN 500 MG/1
500 TABLET, FILM COATED ORAL
Qty: 4 TABLET | Refills: 0 | Status: SHIPPED | OUTPATIENT
Start: 2024-05-02 | End: 2024-05-12 | Stop reason: HOSPADM

## 2024-04-29 RX ADMIN — TIOTROPIUM BROMIDE INHALATION SPRAY 2 PUFF: 3.12 SPRAY, METERED RESPIRATORY (INHALATION) at 07:52

## 2024-04-29 RX ADMIN — Medication 1 CAPSULE: at 08:42

## 2024-04-29 RX ADMIN — CEFEPIME 2000 MG: 2 INJECTION, POWDER, FOR SOLUTION INTRAVENOUS at 13:42

## 2024-04-29 RX ADMIN — Medication 10 ML: at 08:43

## 2024-04-29 RX ADMIN — POTASSIUM & SODIUM PHOSPHATES POWDER PACK 280-160-250 MG 1 PACKET: 280-160-250 PACK at 08:42

## 2024-04-29 RX ADMIN — FERROUS SULFATE TAB 325 MG (65 MG ELEMENTAL FE) 325 MG: 325 (65 FE) TAB at 08:42

## 2024-04-29 RX ADMIN — FLUTICASONE PROPIONATE 2 SPRAY: 50 SPRAY, METERED NASAL at 08:43

## 2024-04-29 RX ADMIN — MAGNESIUM OXIDE TAB 400 MG (241.3 MG ELEMENTAL MG) 400 MG: 400 (241.3 MG) TAB at 08:42

## 2024-04-29 RX ADMIN — PANTOPRAZOLE SODIUM 40 MG: 40 TABLET, DELAYED RELEASE ORAL at 06:26

## 2024-04-29 RX ADMIN — BUDESONIDE AND FORMOTEROL FUMARATE DIHYDRATE 2 PUFF: 160; 4.5 AEROSOL RESPIRATORY (INHALATION) at 07:52

## 2024-04-29 RX ADMIN — HEPARIN SODIUM 5000 UNITS: 5000 INJECTION INTRAVENOUS; SUBCUTANEOUS at 08:41

## 2024-04-29 NOTE — PLAN OF CARE
Goal Outcome Evaluation:  Plan of Care Reviewed With: patient        Progress: improving            Problem: Adult Inpatient Plan of Care  Goal: Absence of Hospital-Acquired Illness or Injury  Intervention: Identify and Manage Fall Risk  Recent Flowsheet Documentation  Taken 4/28/2024 2000 by Josy Irvin RN  Safety Promotion/Fall Prevention: activity supervised  Intervention: Prevent Skin Injury  Recent Flowsheet Documentation  Taken 4/28/2024 2000 by Josy Irvin RN  Body Position:   sitting up in bed   position changed independently  Intervention: Prevent and Manage VTE (Venous Thromboembolism) Risk  Recent Flowsheet Documentation  Taken 4/28/2024 2000 by Josy Irvin RN  Activity Management: activity encouraged     Problem: Adult Inpatient Plan of Care  Goal: Absence of Hospital-Acquired Illness or Injury  Intervention: Prevent Skin Injury  Recent Flowsheet Documentation  Taken 4/28/2024 2000 by Josy Irvin RN  Body Position:   sitting up in bed   position changed independently     Problem: Adult Inpatient Plan of Care  Goal: Absence of Hospital-Acquired Illness or Injury  Intervention: Prevent and Manage VTE (Venous Thromboembolism) Risk  Recent Flowsheet Documentation  Taken 4/28/2024 2000 by Josy Irvin RN  Activity Management: activity encouraged

## 2024-04-29 NOTE — CASE MANAGEMENT/SOCIAL WORK
Continued Stay Note  [facility]     Patient Name: David Barfield  MRN: [id number]  Today's Date: 4/29/2024    Admit Date: 4/24/2024    Plan: Home with Family   Discharge Plan       Row Name 04/29/24 1353       Plan    Plan Home with Family    Patient/Family in Agreement with Plan yes    Plan Comments I have met with Mr. Barfield at the bedside to discuss today's discharge.  I have offered to submit referrals to local home health agencies per PT recommendations.  He declines these services.  He states that [facility] will provide assistance and transportaiton as needed.  He denies having any discharge needs at this time.    Final Discharge Disposition Code 01 - home or self-care                   Discharge Codes    No documentation.                 Expected Discharge Date and Time       Expected Discharge Date Expected Discharge Time    Apr 29, 2024               Luisana Elmore RN

## 2024-04-29 NOTE — PROGRESS NOTES
"Enter Query Response Below      Query Response: Hypotension and ARACELI due to sepsis             If applicable, please update the problem list.   Patient: David Barfield        : 1950  Account: [id number]           Admit Date:         How to Respond to this query:       a. Click New Note     b. Answer query within the yellow box.                c. Update the Problem List, if applicable.      If you have any questions about this query contact me at: [email or web address]     Dr. Lepe,     The patient is noted with Sepsis with no known source of infection, ARACELI, and hypotension.  The 24 Discharge Summary states \"Blood pressure was on the low side.  Was started on midodrine and IV fluid.\"  Treatment has included Normal Saline @ 100 cc/hr , Lactated Ringers with 20meq KCL @ 75 cc/hr -, Normal Saline 1 Liter Bolus x2 , Normal Saline 500 ml Bolus , Midodrine -, Vancomycin , Zosyn -, Daptomycin , Cefepime -, and discharging on Levaquin.     Hypotension and ARACELI due to Sepsis  Hypotension and ARACELI due to _____________________.  Other (please specify) ___________  Unable to clarify    By submitting this query, we are merely seeking further clarification of documentation to accurately reflect all conditions that you are monitoring, evaluating, treating or that extend the hospitalization or utilize additional resources of care. Please utilize your independent clinical judgment when addressing the question(s) above.     This query and your response, once completed, will be entered into the legal medical record.    Sincerely,  Leticia MAR, RN, CCDS  Clinical Documentation Improvement Program  [email or web address]  "

## 2024-04-29 NOTE — PLAN OF CARE
Goal Outcome Evaluation:  Plan of Care Reviewed With: patient, spouse    Dc home with family care via private vehicle accompanied by pt's spouse. VSS. Denies pain/ n&v . Afebrile. IV and tele monitor removed. All pt's belonging sent home w pt.

## 2024-04-29 NOTE — PROGRESS NOTES
INFECTIOUS DISEASE PROGRESS NOTE    David Barfield  1950  [id number]    Date of consult: 4/25/24    Admit date: 4/24/2024    Requesting Provider: Dr. Mitchell  Evaluating physician: Derian Barry MD  Reason for Consultation: sepsis; recent pyelonephritis   Chief Complaint: Weakness, nausea/Vomiting      Subjective   History of present illness:  David Barfield is a  74 y.o.  Yr old male, with PMH NSCLC/RLL lobectomy (1/2024) recently on Opdivo (last dose on 4/4/24), HTN, and emphysema who I followed during a recent admission where he presented with nausea, vomiting, diarrhea, low back pain, and weakness.  The patient was noted to have bilateral perinephric stranding on CT scan but urine culture was no growth.  He did have pyuria and urinalysis.  He suffered from acute kidney injury with a creatinine that trended over 8 but was subsequently improving prior to discharge.  His C. Difficile test was negative.  diarrhea subsequently improved.  He received about 10 days of IV antibiotic therapy for  pyelonephritis with clinical improvement in his white blood cell count trended down.  He was discharged home on 4/22.    The patient presented back to the ER for vague abdominal pain, nausea, vomiting, and weakness.  He had initially done okay after his discharge and was eating and drinking okay but woke up with vague abdominal pain down both sides of his abdomen and started having some nausea and vomiting. No diarrhea. His wife was concerned that he would get worse due to dehydration incision called EMS and the patient was transported to the ER.    Since arrival, the patient has remained afebrile. White blood cell count significantly elevated at 39.14 with 91.2% neutrophils.  Lactic acid was 1.6 on arrival.  Pro-calcitonin was 0.48.  Lipase is 23.  LFTs were within normal limits.  Creatinine was 3.25, down from 4.19 at time of discharge.  Urinalysis showed 2+ blood, 2+ protein, 1+ leukocyte esterase, negative  nitrite, 6-10 RBCs, 11-20 WBCs, and no bacteria.  Urine culture is in progress.  Respiratory PCR panel was negative.  Blood cultures are in progress. CT chest showed stable postsurgical changes of the right lower lung with scarring and chronic appearing loculated right pleural effusion.  He did have mild emphysema with biapical scarring.  CT abdomen and pelvis showed no new significant abnormality.  Per radiology, the patient had a significant decrease in perinephric fat stranding about the bilateral kidneys and decreased bladder wall thickening compared to his CT from last admission.  The patient was started on empiric vancomycin and Zosyn by the primary team.  ID has been consult and for recommendations due to concern for sepsis and in the setting of his recent pyelonephritis.    Subjective:    4/26/24: The patient is feeling somewhat better today. No fevers. Nausea and emesis has improved. Diarrhea improved. No abdominal pain. No new rashes. No fevers. Leukocytosis improving. The patient does state that he has some pain in his perineum when he is bearing down to have a bowel movement and his wife questions if he could have prostatitis.    4/28/24: Afebrile.  Creatinine improved to 2.35.  WBC much improved and now 8.7. Blood cultures remain negative to date. He is feeling better.  He denies f/c, soa, n/v/d, rashes.  He is not sleeping well because of how late the antibiotic is being infused.    4/29/24: The patient is feeling much better.  No nausea, vomiting, or severe watery diarrhea.  No abdominal pain or flank pain.  No urinary complaints.  No shortness of breath or cough.  No new rashes. White blood cell count is normalized.  No fevers. Karius test was negative.    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    Diabetes mellitus Borderline    Emphysema/COPD     Erectile disorder     GERD  "(gastroesophageal reflux disease)     History of chemotherapy     Hyperlipidemia     Hypertension 2019    Lung nodule     Mumps     Mumps     Pruritus     after bath    Slow to wake up after anesthesia     Wears dentures     upper only    Wears hearing aid in both ears     usually only wears right       Past Surgical History:   Procedure Laterality Date    BONE BIOPSY      broken bone surgery in his face    BRONCHOSCOPY THORACOTOMY Right 1/9/2024    Procedure: THORACOTOMY FOR LOWER LOBECTOMY AND MEDISTINAL LYMPH NODE DISSECTION RIGHT;  Surgeon: Joey Patel MD;  Location:  [facility];  Service: Cardiothoracic;  Laterality: Right;    BRONCHOSCOPY WITH ION ROBOTIC ASSIST N/A 09/15/2023    Procedure: BRONCHOSCOPY NAVIGATION WITH ENDOBRONCHIAL ULTRASOUND AND ION ROBOT;  Surgeon: Octaviano Sampson MD;  Location:  [facility];  Service: Robotics - Pulmonary;  Laterality: N/A;  ion #6 - [id number]  - 0015  Cath guide 0061    EBUS balloon removed and intact    CARDIAC ELECTROPHYSIOLOGY PROCEDURE N/A 08/17/2021    Procedure: Pacemaker DC new;  Surgeon: Kayy Box MD;  Location:  [facility];  Service: Cardiology;  Laterality: N/A;    FACIAL FRACTURE SURGERY      PACEMAKER IMPLANTATION         Pediatric History   Patient Parents    Not on file     Other Topics Concern    Not on file   Social History Narrative    Lives in [place], [place]       family history includes Aneurysm in his mother; Dementia in his father; Heart disease in his paternal grandmother; Hypertension in his paternal grandfather; Leukemia in his sister.    Allergies   Allergen Reactions    Cymbalta [Duloxetine Hcl] GI Intolerance    Gabapentin Mental Status Change     Pt states that this medication \"makes him feel foolish in his head\".     Remeron [Mirtazapine] Other (See Comments)     Excess sedation    Toradol [Ketorolac Tromethamine] GI Intolerance     Projectile vomiting     Latex Other (See Comments)     Latex allergy  "    Tape Rash       Immunization History   Administered Date(s) Administered    ABRYSVO (RSV, 60+ or pregnant women 32-36 wks) 10/16/2023    COVID-19 (PFIZER) BIVALENT 12+YRS 09/16/2022    COVID-19 (PFIZER) Purple Cap Monovalent 01/29/2021, 02/19/2021, 10/18/2021, 04/14/2022    COVID-19 F23 (PFIZER) 12YRS+ (COMIRNATY) 09/26/2023    Fluzone High-Dose 65+yrs 10/06/2023    Pneumococcal Conjugate 20-Valent (PCV20) 10/11/2023       Medication:  No current facility-administered medications for this encounter.    Current Outpatient Medications:     albuterol sulfate  (90 Base) MCG/ACT inhaler, Inhale 2 puffs Every 4 (Four) Hours As Needed for Wheezing., Disp: 18 g, Rfl: 11    amLODIPine (NORVASC) 5 MG tablet, Take 1 tablet by mouth Daily for 30 days., Disp: 30 tablet, Rfl: 0    B Complex Vitamins (vitamin b complex) capsule capsule, Take 1 capsule by mouth Daily. OTC, Disp: , Rfl:     ferrous sulfate 325 (65 FE) MG tablet, Take 1 tablet by mouth Daily With Breakfast. OTC, Disp: , Rfl:     fluticasone (VERAMYST) 27.5 MCG/SPRAY nasal spray, 2 sprays into the nostril(s) as directed by provider 1 (One) Time As Needed for Rhinitis or Allergies., Disp: 6 mL, Rfl: 2    Fluticasone-Umeclidin-Vilant (Trelegy Ellipta) 100-62.5-25 MCG/ACT inhaler, Inhale 1 puff Daily., Disp: 3 each, Rfl: 3    HYDROcodone-acetaminophen (Norco) 7.5-325 MG per tablet, Take 1 tablet by mouth Every 6 (Six) Hours As Needed for Moderate Pain., Disp: 60 tablet, Rfl: 0    lidocaine-prilocaine (EMLA) 2.5-2.5 % cream, Apply 1 application  topically to the appropriate area as directed As Needed (45-60 minutes prior to port access.  Cover with saran/plastic wrap.)., Disp: 30 g, Rfl: 3    [START ON 5/3/2024] omeprazole (priLOSEC) 40 MG capsule, Take 1 capsule by mouth Daily., Disp: 30 capsule, Rfl: 5    ondansetron (ZOFRAN) 8 MG tablet, Take 1 tablet by mouth 3 (Three) Times a Day As Needed for Nausea or Vomiting., Disp: 30 tablet, Rfl: 2    pravastatin  "(PRAVACHOL) 80 MG tablet, Take 1 tablet by mouth Every Night., Disp: 30 tablet, Rfl: 11    [START ON 5/2/2024] levoFLOXacin (Levaquin) 500 MG tablet, Take 1 tablet by mouth Every Other Day for 8 days., Disp: 4 tablet, Rfl: 0    [START ON 4/30/2024] levoFLOXacin (Levaquin) 750 MG tablet, Take 1 tablet by mouth Daily., Disp: 1 tablet, Rfl: 0    sildenafil (VIAGRA) 100 MG tablet, Take 0.5 tablets by mouth Daily As Needed for Erectile Dysfunction., Disp: , Rfl:     Please refer to the medical record for a full medication list    Review of Systems:    As above    Physical Exam:   Vital Signs   Temp:  [98 °F (36.7 °C)-98.7 °F (37.1 °C)] 98 °F (36.7 °C)  Heart Rate:  [70-95] 95  Resp:  [16-20] 18  BP: (134-155)/(83-89) 144/83    Temp  Min: 98 °F (36.7 °C)  Max: 98.7 °F (37.1 °C)  BP  Min: 134/89  Max: 155/88  Pulse  Min: 70  Max: 95  Resp  Min: 16  Max: 20  SpO2  Min: 96 %  Max: 98 %    Blood pressure 144/83, pulse 95, temperature 98 °F (36.7 °C), temperature source Oral, resp. rate 18, height 182.9 cm (72\"), weight 79.4 kg (175 lb), SpO2 98%.  GENERAL: Awake and alert, in no acute distress. Appears stated age.  Less frail-appearing  HEENT:  Normocephalic, atraumatic. No external oral lesions noted  EYES: Anicteric.  No conjunctival injection  HEART: RRR, no murmur  LUNGS: CTA B.  ABDOMEN: Soft, nontender, nondistended. No HSM palpated  MSK: No joint effusions noted.  Full range of motion throughout.  No tenderness to palpation over his spine.  No CVA tenderness bilaterally.  GENITAL: no Yeung catheter  SKIN: No new rashes noted  PSYCHIATRIC: cooperative.  Appropriate mood and affect  EXT:  No cellulitic change.  NEURO: awake alert and oriented ×4.  Normal speech and cognition    Left-sided Mediport site is without any erythema or drainage    Right-sided AICD pocket site is without any erythema or tenderness    Results Review:   I reviewed the patient's new clinical results.  I reviewed the patient's new imaging results " "and agree with the interpretation.  I reviewed the patient's other test results and agree with the interpretation    Results from last 7 days   Lab Units 04/29/24  0422 04/28/24  0303 04/27/24  0410   WBC 10*3/mm3 9.41 8.71 10.14   HEMOGLOBIN g/dL 9.7* 9.6* 9.3*   HEMATOCRIT % 30.1* 28.4* 28.8*   PLATELETS 10*3/mm3 244 243 305     Results from last 7 days   Lab Units 04/29/24  0422   SODIUM mmol/L 143   POTASSIUM mmol/L 3.9   CHLORIDE mmol/L 111*   CO2 mmol/L 21.0*   BUN mg/dL 12   CREATININE mg/dL 2.03*   GLUCOSE mg/dL 124*   CALCIUM mg/dL 8.6     Results from last 7 days   Lab Units 04/25/24  0727   ALK PHOS U/L 76   BILIRUBIN mg/dL 0.3   ALT (SGPT) U/L 10   AST (SGOT) U/L 12             Results from last 7 days   Lab Units 04/25/24  0727   VANCOMYCIN RM mcg/mL 10.50     Results from last 7 days   Lab Units 04/24/24  0922   LACTATE mmol/L 1.6     Estimated Creatinine Clearance: 35.3 mL/min (A) (by C-G formula based on SCr of 2.03 mg/dL (H)).  CPK          4/25/2024    07:27   Common Labs   Creatine Kinase 19       Procalitonin Results:      Lab 04/24/24  0922   PROCALCITONIN 0.48*      Brief Urine Lab Results  (Last result in the past 365 days)        Color   Clarity   Blood   Leuk Est   Nitrite   Protein   CREAT   Urine HCG        04/24/24 1016 Yellow   Clear   Moderate (2+)   Small (1+)   Negative   100 mg/dL (2+)                  No results found for: \"SITE\", \"ALLENTEST\", \"PHART\", \"WYV9QDL\", \"PO2ART\", \"WUS7BPC\", \"BASEEXCESS\", \"Q0JEZGSA\", \"HGBBG\", \"HCTABG\", \"OXYHEMOGLOBI\", \"METHHGBN\", \"CARBOXYHGB\", \"CO2CT\", \"BAROMETRIC\", \"MODALITY\", \"FIO2\"       Microbiology:  Urine culture: negative.    Blood culture ×2: In progress-negative to date.     Karius test Negative  GI panel PCR negative.   Cdiff PCR negative.       Radiology:  Imaging Results (Last 72 Hours)       ** No results found for the last 72 hours. **            IMPRESSION:     Problems:  Severe leukocytosis with neutrophilia-Unclear etiology.  No diarrhea.  " Could be having recurrent urinary infection/pyelonephritis although fat stranding around his kidneys appears improved on repeat CT abdomen and pelvis.  He does have a Mediport and a AICD in place.  He has a chronic appearing right-sided loculated pleural effusion at the area of scarring from his prior surgery. Unclear if he could have prostatitis given his pain in his perineum when he has bearing down for bowel movement. Blood cultures are No growth to date. Karius test was negative.  Much improved. Source still somewhat unclear.  Unclear if he could have prostatitis. Does have a chronic appearing loculated right pleural effusion and postsurgical changes/scarring in the right lower lung.  Respiratory status has been stable.  Nausea and vomiting, improved  Diarrhea-C. Difficile and GI panel PCR tests were negative  Recent bilateral pyelonephritis  Pyuria/possible UTI.  Cx negative.   Recent acute renal failure-improving.  Creatinine has trended down to 3.25, from 4.19 at time of discharge on 4/22. Now 2.35 on 4/28.   NSCLC/RLL lobectomy (1/2024)  Normocytic anemia-Improved from time of discharge  Emphysema    RECOMMENDATIONS:    -TTE in order to help rule out infective endocarditis/lead infection especially given his AICD in place.--thickening of aortic valves but no evidence of vegetations.  -IV fluids and antiemetics prn per primary team  -Last dose of cefepime today  -Karius test was negative (although was antibiotic modified)    I would be okay with the patient's discharge today after last dose of cefepime.    UM/XAVI:  Tomorrow, he can start levaquin 750 mg PO x 1 dose followed by levaquin 500 mg PO q48h. Please give a 1 week supply at time of dicharge  I will plan to see the patient in follow up in 1 week. The primary care appointment this Friday and labs may be repeated at that appointment or I can repeat labs at his appointment next week.      I discussed the patient's case with Dr. Lepe today    Thank you  for asking me to see David Barfield.      Copied text in this note has been reviewed and is accurate as of 04/29/24        Derian Barry MD  4/29/2024

## 2024-04-29 NOTE — DISCHARGE SUMMARY
[facility] Medicine Services  DISCHARGE SUMMARY    Patient Name: David Barfield  : 1950  MRN: [id number]    Date of Admission: 2024  9:10 AM  Date of Discharge:  2024  Primary Care Physician: Shahid Drake MD    Consults       Date and Time Order Name Status Description    2024  2:19 PM Inpatient Hematology & Oncology Consult Completed     2024  9:52 AM Inpatient Nephrology Consult Completed     2024  3:19 PM Inpatient Infectious Diseases Consult Completed     2024  2:55 AM Inpatient Infectious Diseases Consult Completed             Hospital Course     Presenting Problem: Weakness, N/V     Active Hospital Problems    Diagnosis  POA    Severe malnutrition [E43]  Yes    ARACELI (acute kidney injury) [N17.9]  Yes    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 male with history of non-small cell lung ca s/p neoadjuvant chemoimmunotherapy and RLL lobectomy currently on Opdivo (last 24), HTN, emphysema, presence of port and pacemaker, current tobacco use, recent admission (-)  for sepsis related to pyelonephritis who presented back with vague abd pain, nausea/vomiting and weakness. Initial labs with WBC 39, procal 0.48, Cr 3.25. CT chest with no acute findings. CT abd/pel with no new findings, improved perinephric fat stranding about the bilateral kidneys. Patient previously seen by my colleagues and new to my services on AM of discharge of . Discussed case with Dr. Barry this AM, infectious workup thus far, discussed with him and following antibiotics today  plans for discharge to home with oral antibiotics. Patient to follow up with his PCP office for repeat labs on 5/3 and will follow up with Dr. Barry at [facility] on 5/6 for follow up and reassessment. Patient and wife updated at bedside again this afternoon and plans are for discharge to home later today.      Leukocytosis  Sepsis with no known source of infection  - Last admission, Ucx and Bcx negative  - Completed 10 days of cefepime -- WBC up to 46 but normalized during admission. WBC 10.87 on 4/20 and 39 on admission   - Bcx NGTD x 5 days, Ucx negative; resp PCR negative   - changed to Cefepime from ID  -Patient denies fever.  Patient's wife was present at the bedside reports that they have measured temperature up to 90s.   -WBC has normalized.  Patient remains afebrile.  - discussed with Dr. Barry, plans for completion of IV antibiotics today, will transition to PO Levaquin q 48 hrs and close outpatient follow up with his PCP for repeat labs on 5/3 and with Dr. Barry on 5/6      ARACELI on CKD 3  Metabolic acidosis  - ARACELI on last admission. Neph followed at that time  - Baseline Cr 0.9-1.1. Peaked to 8.45 last admission  - Cr continues to improve.   - improved to 2.03 on discharge      HTN   -Blood pressure was on the low side.  Was started on midodrine and IV fluid.  Blood pressures has improved.  Will stop midodrine and monitor.   -As blood pressure has increased we will restart amlodipine.     Non-small cell lung cancer  - Follows with Dr. Ashley. Last Opdivo treatment 4/4   - Continued outpatient follow up      Anemia  - Improved. Follow up outpatient with hematology   -Transfuse 1 unit of packed red blood cell and monitor.     Plan:  -Plans for d/c to home on 4/29 with continued outpatient follow up and PO antibiotics       Discharge Follow Up Recommendations for outpatient labs/diagnostics:   Follow up with PCP on 5/3 for repeat labs and evaluation   Follow up with Dr. Cook on 5/6 for follow up  at [facility]     Day of Discharge     HPI:   Patient is a 73 yo M seen and examined by me this am and again early this afternoon, resting in bed and wife at bedside, no new acute complaints or problems at this time.       Vital Signs:   Temp:  [98 °F (36.7 °C)-98.7 °F (37.1 °C)] 98 °F (36.7 °C)  Heart Rate:  [70-95] 95  Resp:  [16-20] 18  BP: (134-155)/(83-89) 144/83      Physical Exam:  Constitutional: No acute distress, awake, alert, frail appearing, resting comfortably in bed.   HENT: NCAT, nares patent, mucous membranes moist  Respiratory: Decreased BS bilaterally, no rhonchi or wheezing, respiratory effort normal   Cardiovascular: RRR, no murmurs, rubs, or gallops  Gastrointestinal: Positive bowel sounds, soft, nontender, nondistended  Musculoskeletal: No bilateral ankle edema, no clubbing or cyanosis  Psychiatric: Appropriate affect, cooperative  Neurologic: Oriented x 3, strength symmetric in all extremities, Cranial Nerves grossly intact to confrontation, speech clear  Skin: No rashes      Pertinent  and/or Most Recent Results     LAB RESULTS:      Lab 04/29/24  0422 04/28/24  0303 04/27/24  0410 04/26/24  2057 04/26/24  0407 04/25/24  0727 04/24/24  0922   WBC 9.41 8.71 10.14  --  24.65* 45.90* 39.14*   HEMOGLOBIN 9.7* 9.6* 9.3* 8.8* 7.0* 7.8* 9.9*   HEMATOCRIT 30.1* 28.4* 28.8* 27.5* 22.4* 24.4* 30.9*   PLATELETS 244 243 305  --  265 287 384   NEUTROS ABS 6.38 5.52 6.67  --  21.27* 41.95* 35.71*   IMMATURE GRANS (ABS) 0.07* 0.05 0.09*  --  0.56* 0.77* 0.38*   LYMPHS ABS 1.54 1.82 1.45  --  0.96 1.22 0.89   MONOS ABS 0.99* 0.93* 1.52*  --  1.54* 1.83* 2.01*   EOS ABS 0.35 0.34 0.36  --  0.27 0.03 0.04   MCV 90.9 88.5 91.7  --  98.2* 97.2* 96.6   PROCALCITONIN  --   --   --   --   --   --  0.48*   LACTATE  --   --   --   --   --   --  1.6   LDH  --   --   --   --  118*  --   --          Lab 04/29/24  0422 04/28/24  1422 04/28/24  0303 04/27/24  0410 04/26/24  0407 04/25/24  2114 04/25/24  0727   SODIUM 143  --   144 143 139 137 139   POTASSIUM 3.9  --  3.6 3.5 3.1* 3.3* 3.2*   CHLORIDE 111*  --  111* 114* 110* 110* 110*   CO2 21.0*  --  23.0 17.0* 15.0* 15.0* 15.0*   ANION GAP 11.0  --  10.0 12.0 14.0 12.0 14.0   BUN 12  --  13 16 21 22 22   CREATININE 2.03*  --  2.35* 2.68* 2.85* 2.88* 3.22*   EGFR 33.6*  --  28.2* 24.0* 22.3* 22.1* 19.3*   GLUCOSE 124*  --  103* 99 95 97 93   CALCIUM 8.6  --  8.2* 7.9* 7.8* 7.8* 7.5*   MAGNESIUM 1.4*  --  1.4* 1.3* 1.3*  --  1.1*   PHOSPHORUS 2.3* 1.8* 1.7*  --  3.0  --   --    TSH  --   --   --   --  0.736  --   --          Lab 04/25/24  0727 04/24/24  0922   TOTAL PROTEIN 5.8* 8.5   ALBUMIN 2.8* 3.5   GLOBULIN 3.0 5.0   ALT (SGPT) 10 18   AST (SGOT) 12 25   BILIRUBIN 0.3 0.3   ALK PHOS 76 90   LIPASE  --  23         Lab 04/24/24  1125 04/24/24  0922   HSTROP T 26* 29*             Lab 04/26/24  0852   ABO TYPING A   RH TYPING Negative   ANTIBODY SCREEN Negative         Lab 04/25/24  0908   PH, ARTERIAL 7.362   PCO2, ARTERIAL 25.1*   PO2 .0*   FIO2 21   HCO3 ART 14.2*   BASE EXCESS ART -10.1*   CARBOXYHEMOGLOBIN 1.6     Brief Urine Lab Results  (Last result in the past 365 days)        Color   Clarity   Blood   Leuk Est   Nitrite   Protein   CREAT   Urine HCG        04/24/24 1016 Yellow   Clear   Moderate (2+)   Small (1+)   Negative   100 mg/dL (2+)                 Microbiology Results (last 10 days)       Procedure Component Value - Date/Time    [facility] Digital Culture - Blood, Arm, Left [[id number]] Collected: 04/26/24 1156    Lab Status: Final result Specimen: Blood from Arm, Left Updated: 04/29/24 0746     Reference Lab Report See Attached Report    Gastrointestinal Panel, PCR - Stool, Per Rectum [[id number]]  (Normal) Collected: 04/25/24 2026    Lab Status: Final result Specimen: Stool from Per Rectum Updated: 04/26/24 0742     Campylobacter Not Detected     Plesiomonas shigelloides Not Detected     Salmonella Not Detected     Vibrio Not Detected     Vibrio cholerae Not Detected      Yersinia enterocolitica Not Detected     Enteroaggregative E. coli (EAEC) Not Detected     Enteropathogenic E. coli (EPEC) Not Detected     Enterotoxigenic E. coli (ETEC) lt/st Not Detected     Shiga-like toxin-producing E. coli (STEC) stx1/stx2 Not Detected     Shigella/Enteroinvasive E. coli (EIEC) Not Detected     Cryptosporidium Not Detected     Cyclospora cayetanensis Not Detected     Entamoeba histolytica Not Detected     Giardia lamblia Not Detected     Adenovirus F40/41 Not Detected     Astrovirus Not Detected     Norovirus GI/GII Not Detected     Rotavirus A Not Detected     Sapovirus (I, II, IV or V) Not Detected    MRSA Screen, PCR (Inpatient) - Swab, Nares [[id number]]  (Normal) Collected: 04/25/24 1542    Lab Status: Final result Specimen: Swab from Nares Updated: 04/25/24 1716     MRSA PCR Negative    Narrative:      The negative predictive value of this diagnostic test is high and should only be used to consider de-escalating anti-MRSA therapy. A positive result may indicate colonization with MRSA and must be correlated clinically.  MRSA Negative    Clostridioides difficile Toxin - Stool, Per Rectum [[id number]]  (Normal) Collected: 04/25/24 1335    Lab Status: Final result Specimen: Stool from Per Rectum Updated: 04/25/24 1448    Narrative:      The following orders were created for panel order Clostridioides difficile Toxin - Stool, Per Rectum.  Procedure                               Abnormality         Status                     ---------                               -----------         ------                     Clostridioides difficile...[[id number]]  Normal              Final result                 Please view results for these tests on the individual orders.    Clostridioides difficile Toxin, PCR - Stool, Per Rectum [[id number]]  (Normal) Collected: 04/25/24 1335    Lab Status: Final result Specimen: Stool from Per Rectum Updated: 04/25/24 1448     Toxigenic C. difficile by PCR Not Detected     Narrative:      The result indicates the absence of toxigenic C. difficile from stool specimen.     Blood Culture - Blood, Arm, Left [[id number]]  (Normal) Collected: 04/24/24 1100    Lab Status: Final result Specimen: Blood from Arm, Left Updated: 04/29/24 1130     Blood Culture No growth at 5 days    Narrative:      Less than seven (7) mL's of blood was collected.  Insufficient quantity may yield false negative results.    Blood Culture - Blood, Arm, Right [[id number]]  (Normal) Collected: 04/24/24 1049    Lab Status: Final result Specimen: Blood from Arm, Right Updated: 04/29/24 1130     Blood Culture No growth at 5 days    Narrative:      Less than seven (7) mL's of blood was collected.  Insufficient quantity may yield false negative results.    Respiratory Panel PCR w/COVID-19(SARS-CoV-2) [facility] In-House, NP Swab in UTM/VTM, 2 HR TAT - Swab, Nasopharynx [[id number]]  (Normal) Collected: 04/24/24 1016    Lab Status: Final result Specimen: Swab from Nasopharynx Updated: 04/24/24 1115     ADENOVIRUS, PCR Not Detected     Coronavirus 229E Not Detected     Coronavirus HKU1 Not Detected     Coronavirus NL63 Not Detected     Coronavirus OC43 Not Detected     COVID19 Not Detected     Human Metapneumovirus Not Detected     Human Rhinovirus/Enterovirus Not Detected     Influenza A PCR Not Detected     Influenza B PCR Not Detected     Parainfluenza Virus 1 Not Detected     Parainfluenza Virus 2 Not Detected     Parainfluenza Virus 3 Not Detected     Parainfluenza Virus 4 Not Detected     RSV, PCR Not Detected     Bordetella pertussis pcr Not Detected     Bordetella parapertussis PCR Not Detected     Chlamydophila pneumoniae PCR Not Detected     Mycoplasma pneumo by PCR Not Detected    Narrative:      In the setting of a positive respiratory panel with a viral infection PLUS a negative procalcitonin without other underlying concern for bacterial infection, consider observing off antibiotics or  discontinuation of antibiotics and continue supportive care. If the respiratory panel is positive for atypical bacterial infection (Bordetella pertussis, Chlamydophila pneumoniae, or Mycoplasma pneumoniae), consider antibiotic de-escalation to target atypical bacterial infection.    Urine Culture - Urine, Urine, Clean Catch [[id number]]  (Normal) Collected: 04/24/24 1016    Lab Status: Final result Specimen: Urine, Clean Catch Updated: 04/25/24 1023     Urine Culture No growth            Adult Transthoracic Echo Complete W/ Cont if Necessary Per Protocol    Result Date: 4/27/2024    Left ventricular ejection fraction appears to be 61 - 65%.   Left ventricular wall thickness is consistent with mild concentric hypertroph   There is calcification and thickening of the aortic valve.  No independently mobile vegetations visualized.   Mild aortic valve regurgitation is present   Mild dilation of the aortic root is present.  Measures 4.0 cm.   There are pacemaker leads noted in the right atrium/right ventricle.   Mild tricuspid valve regurgitation is present. Estimated right ventricular systolic pressure from tricuspid regurgitation is normal (<35 mmHg).   Mild mitral valve regurgitation is present     CT Abdomen Pelvis Without Contrast    Result Date: 4/24/2024  CT ABDOMEN PELVIS WO CONTRAST Date of Exam: 4/24/2024 12:13 PM EDT Indication: Sepsis, source unclear. Comparison: 4/12/2024 Technique: Axial CT images were obtained of the abdomen and pelvis without the administration of contrast. Reconstructed coronal and sagittal images were also obtained. Automated exposure control and iterative construction methods were used. Findings: Included lung bases are unchanged in appearance from prior CT. No new abnormality. No suspicious hepatic lesion. Gallbladder is unremarkable. No significant biliary ductal dilation. The spleen, pancreas, and bilateral adrenal glands are unchanged. There is significantly decreased perinephric fat  stranding about the bilateral kidneys. There is no evidence of hydronephrosis or nephrolithiasis. No suspicious renal lesion. Small hiatal hernia. No bowel obstruction. Normal appendix. A few scattered colonic diverticuli without evidence of diverticulitis. No suspicious lymphadenopathy. Dense atherosclerotic calcifications of the aorta and branch vessels. Decreased urinary bladder wall thickening which may be in part secondary to increased distention. Prostatomegaly. Abdominal and pelvic wall soft tissues show no acute abnormality. There is no acute fracture or suspicious osseous lesion.     Impression: No new discrete abnormality of the abdomen or pelvis as compared to prior CT. Significantly decreased perinephric fat stranding about the bilateral kidneys. Decreased urinary bladder wall thickening which may be in part secondary to increased distention compared to prior exam. Electronically Signed: Raulito Light MD  4/24/2024 12:47 PM EDT  Workstation ID: [id number]    CT Chest Without Contrast Diagnostic    Result Date: 4/24/2024  CT CHEST WO CONTRAST DIAGNOSTIC Date of Exam: 4/24/2024 12:13 PM EDT Indication: Cough, sepsis, unclear source. Comparison: CT chest 4/12/2024 Technique: Axial CT images were obtained of the chest without contrast administration.  Reconstructed coronal and sagittal images were also obtained. Automated exposure control and iterative construction methods were used. Findings: MEDIASTINUM: The heart size is stable. Trace pericardial fluid. Right chest wall AICD. Left chest wall infusion port with tip in the SVC. CORONARY ARTERIES: There is calcified atherosclerotic disease. LUNGS: There are postsurgical changes of the right lower lung with inferomedial scarring posteriorly. There is a loculated right pleural effusion tracking into the fissure, similar to the previous study. There is diffuse emphysema with biapical scarring. LYMPH NODES: Mildly prominent mediastinal lymph nodes are unchanged.  There are small calcified subcarinal and left hilar nodes. OSSEOUS STRUCTURES: Multilevel thoracic degenerative disc disease. Stable healing right-sided rib fractures. No acute osseous abnormality.     Impression: 1. Stable postsurgical changes of the right lower lung with scarring and chronic appearing loculated right pleural effusion. 2. Mild emphysema and biapical scarring. Electronically Signed: Aimee Freitas MD  4/24/2024 12:37 PM EDT  Workstation ID: [id number]    XR Chest 1 View    Result Date: 4/24/2024  XR CHEST 1 VW Date of Exam: 4/24/2024 10:15 AM EDT Indication: Sepsis, cough Comparison: 4/12/2024 Findings: Cardiomediastinal silhouette is stable. There are postsurgical changes of the right lung with a small chronic effusion. Left lung is clear. Left-sided port catheter with the tip in the SVC. Right AICD. No acute osseous abnormality identified.     Impression: Stable chronic appearance of the chest. No acute cardiopulmonary abnormality. Electronically Signed: Aimee Freitas MD  4/24/2024 10:37 AM EDT  Workstation ID: [id number]             Results for orders placed during the hospital encounter of 04/24/24    Adult Transthoracic Echo Complete W/ Cont if Necessary Per Protocol    Interpretation Summary    Left ventricular ejection fraction appears to be 61 - 65%.    Left ventricular wall thickness is consistent with mild concentric hypertroph    There is calcification and thickening of the aortic valve.  No independently mobile vegetations visualized.    Mild aortic valve regurgitation is present    Mild dilation of the aortic root is present.  Measures 4.0 cm.    There are pacemaker leads noted in the right atrium/right ventricle.    Mild tricuspid valve regurgitation is present. Estimated right ventricular systolic pressure from tricuspid regurgitation is normal (<35 mmHg).    Mild mitral valve regurgitation is present      Plan for Follow-up of Pending Labs/Results: Follow up with PCP and Dr. Barry  within the next 1 week   Pending Labs       Order Current Status    Peripheral Blood Smear In process    Testosterone, Free, Total In process          Discharge Details        Discharge Medications        New Medications        Instructions Start Date   levoFLOXacin 750 MG tablet  Commonly known as: Levaquin   750 mg, Oral, Daily   Start Date: April 30, 2024     levoFLOXacin 500 MG tablet  Commonly known as: Levaquin   500 mg, Oral, Every 48 Hours   Start Date: May 2, 2024            Changes to Medications        Instructions Start Date   albuterol sulfate  (90 Base) MCG/ACT inhaler  Commonly known as: PROVENTIL HFA;VENTOLIN HFA;PROAIR HFA  What changed: reasons to take this   2 puffs, Inhalation, Every 4 Hours PRN             Continue These Medications        Instructions Start Date   amLODIPine 5 MG tablet  Commonly known as: NORVASC   5 mg, Oral, Every 24 Hours Scheduled      ferrous sulfate 325 (65 FE) MG tablet   325 mg, Oral, Daily With Breakfast, OTC      fluticasone 27.5 MCG/SPRAY nasal spray  Commonly known as: VERAMYST   2 sprays, Nasal, Once As Needed      HYDROcodone-acetaminophen 7.5-325 MG per tablet  Commonly known as: Norco   1 tablet, Oral, Every 6 Hours PRN      lidocaine-prilocaine 2.5-2.5 % cream  Commonly known as: EMLA   1 application , Topical, As Needed      omeprazole 40 MG capsule  Commonly known as: priLOSEC   40 mg, Oral, Daily   Start Date: May 3, 2024     ondansetron 8 MG tablet  Commonly known as: ZOFRAN   8 mg, Oral, 3 Times Daily PRN      pravastatin 80 MG tablet  Commonly known as: PRAVACHOL   80 mg, Oral, Nightly      sildenafil 100 MG tablet  Commonly known as: VIAGRA   50 mg, Oral, Daily PRN      Trelegy Ellipta 100-62.5-25 MCG/ACT inhaler  Generic drug: Fluticasone-Umeclidin-Vilant   1 puff, Inhalation, Daily - RT      vitamin b complex capsule capsule   1 capsule, Oral, Daily, OTC               Allergies   Allergen Reactions    Cymbalta [Duloxetine Hcl] GI Intolerance     "Gabapentin Mental Status Change     Pt states that this medication \"makes him feel foolish in his head\".     Remeron [Mirtazapine] Other (See Comments)     Excess sedation    Toradol [Ketorolac Tromethamine] GI Intolerance     Projectile vomiting     Latex Other (See Comments)     Latex allergy     Tape Rash         Discharge Disposition:  Home or Self Care    Diet:  Hospital:  Diet Order   Procedures    Diet: Cardiac; Healthy Heart (2-3 Na+); Fluid Consistency: Thin (IDDSI 0)            Activity:  resume previous as tolerated     Restrictions or Other Recommendations:  Follow up appointments as below        CODE STATUS:    Code Status and Medical Interventions:   Ordered at: 04/24/24 1519     Code Status (Patient has no pulse and is not breathing):    CPR (Attempt to Resuscitate)     Medical Interventions (Patient has pulse or is breathing):    Full Support       Future Appointments   Date Time Provider Department Center   5/3/2024  4:00 PM Hayley Castellanos APRN [facility]   7/10/2024 11:00 AM Kayy Box MD [facility]       Additional Instructions for the Follow-ups that You Need to Schedule       Discharge Follow-up with PCP   As directed       Currently Documented PCP:    Shahid Drake MD    PCP Phone Number:    [phone]     Follow Up Details: Follow up with PCP office on 5/3 for repeat labs, patient scheduled with ESTHER Castellanos        Discharge Follow-up with Specified Provider: Follow up with Dr. Derian Barry on 5/6 for follow up   As directed      To: Follow up with Dr. Derian Barry on 5/6 for follow up                      HADLEY Lepe DO  04/29/24      Time Spent on Discharge:  I spent  40  minutes on this discharge activity which included: face-to-face encounter with the patient, reviewing the data in the system, coordination of the care with the nursing staff as well as consultants, documentation, and entering orders.          "

## 2024-04-30 ENCOUNTER — TELEPHONE (OUTPATIENT)
Dept: ONCOLOGY | Facility: CLINIC | Age: 74
End: 2024-04-30

## 2024-04-30 ENCOUNTER — TRANSITIONAL CARE MANAGEMENT TELEPHONE ENCOUNTER (OUTPATIENT)
Dept: CALL CENTER | Facility: HOSPITAL | Age: 74
End: 2024-04-30
Payer: MEDICARE

## 2024-04-30 NOTE — TELEPHONE ENCOUNTER
Caller: ISAÍAS ANDERSON    Relationship to patient:S/O    Best call back number: [phone]    Chief complaint: CANC. - R/S, AS [facility]    Type of visit: LAB & F/U    Requested date: 5/8 OR 5/9 AROUND 1 P.M.     If rescheduling, when is the original appointment: 5/7/24

## 2024-04-30 NOTE — OUTREACH NOTE
Call Center TCM Note      Flowsheet Row Responses   [facility] patient discharged from? [place]   Does the patient have one of the following disease processes/diagnoses(primary or secondary)? Sepsis   TCM attempt successful? Yes   Call start time 1608   Call end time 1610   Discharge diagnosis Sepsis   Person spoke with today (if not patient) and relationship patient   Meds reviewed with patient/caregiver? Yes   Is the patient having any side effects they believe may be caused by any medication additions or changes? No   Does the patient have all medications related to this admission filled (includes all antibiotics, inhalers, nebulizers,steroids,etc.) Yes   Is the patient taking all medications as directed (includes completed medication regime)? Yes   Comments Has a hospital followup scheduled on 5/3/2024 with PCP office.   Does the patient have an appointment with their PCP within 7-14 days of discharge? Yes   Psychosocial issues? No   Did the patient receive a copy of their discharge instructions? Yes   Nursing interventions Reviewed instructions with patient   What is the patient's perception of their health status since discharge? Improving   Nursing interventions Nurse provided patient education   Is the patient/caregiver able to teach back TIME? T emperature - higher or lower than normal, I nfection - may have signs and symptoms of an infection, M ental Decline - confused, sleepy, difficult to arouse, E xtremely Ill - severe pain, discomfort, shortness of breath   Nursing interventions Nurse provided reassurance to patient   Is patient/caregiver able to teach back steps to recovery at home? Rest and regain strength   Is the patient/caregiver able to teach back signs and symptoms of worsening condition: Fever   If the patient is a current smoker, are they able to teach back resources for cessation? Not a smoker   Is the patient/caregiver able to teach back the hierarchy of who to call/visit for  symptoms/problems? PCP, Specialist, Home health nurse, Urgent Care, ED, 911 Yes   TCM call completed? Yes   Wrap up additional comments Patient reports he is doing ok. Is weak, but able to get around house fine.   Call end time 1610   Would this patient benefit from a Referral to [facility] Social Work? No   Is the patient interested in additional calls from an ambulatory ? No            Héctor Conde RN    4/30/2024, 16:10 EDT

## 2024-05-02 ENCOUNTER — HOSPITAL ENCOUNTER (OUTPATIENT)
Dept: ONCOLOGY | Facility: HOSPITAL | Age: 74
Discharge: HOME | End: 2024-05-02
Payer: MEDICARE

## 2024-05-03 ENCOUNTER — OFFICE VISIT (OUTPATIENT)
Dept: INTERNAL MEDICINE | Facility: CLINIC | Age: 74
End: 2024-05-03
Payer: MEDICARE

## 2024-05-03 ENCOUNTER — LAB (OUTPATIENT)
Dept: LAB | Facility: HOSPITAL | Age: 74
End: 2024-05-03
Payer: MEDICARE

## 2024-05-03 VITALS
BODY MASS INDEX: 23.22 KG/M2 | WEIGHT: 171.4 LBS | SYSTOLIC BLOOD PRESSURE: 124 MMHG | DIASTOLIC BLOOD PRESSURE: 60 MMHG | OXYGEN SATURATION: 98 % | HEART RATE: 80 BPM | TEMPERATURE: 97.4 F | HEIGHT: 72 IN | RESPIRATION RATE: 18 BRPM

## 2024-05-03 DIAGNOSIS — N18.30 STAGE 3 CHRONIC KIDNEY DISEASE, UNSPECIFIED WHETHER STAGE 3A OR 3B CKD: ICD-10-CM

## 2024-05-03 DIAGNOSIS — C34.31 MALIGNANT NEOPLASM OF LOWER LOBE OF RIGHT LUNG: ICD-10-CM

## 2024-05-03 DIAGNOSIS — K21.9 GERD WITHOUT ESOPHAGITIS: ICD-10-CM

## 2024-05-03 DIAGNOSIS — Z11.59 ENCOUNTER FOR HEPATITIS C SCREENING TEST FOR LOW RISK PATIENT: ICD-10-CM

## 2024-05-03 DIAGNOSIS — Z13.1 SCREENING FOR DIABETES MELLITUS: ICD-10-CM

## 2024-05-03 DIAGNOSIS — R53.83 FEELING TIRED: ICD-10-CM

## 2024-05-03 DIAGNOSIS — Z71.3 ENCOUNTER FOR DIETARY COUNSELING AND SURVEILLANCE: ICD-10-CM

## 2024-05-03 DIAGNOSIS — Z90.2 STATUS POST LOBECTOMY OF LUNG: ICD-10-CM

## 2024-05-03 DIAGNOSIS — Z76.89 ESTABLISHING CARE WITH NEW DOCTOR, ENCOUNTER FOR: ICD-10-CM

## 2024-05-03 DIAGNOSIS — E78.2 MIXED HYPERLIPIDEMIA: ICD-10-CM

## 2024-05-03 DIAGNOSIS — Z76.89 ENCOUNTER FOR SUPPORT AND COORDINATION OF TRANSITION OF CARE: Primary | ICD-10-CM

## 2024-05-03 DIAGNOSIS — Z95.0 STATUS POST PLACEMENT OF CARDIAC PACEMAKER: ICD-10-CM

## 2024-05-03 DIAGNOSIS — Z79.899 ON STATIN THERAPY: ICD-10-CM

## 2024-05-03 DIAGNOSIS — J43.2 CENTRILOBULAR EMPHYSEMA: ICD-10-CM

## 2024-05-03 DIAGNOSIS — Z09 HOSPITAL DISCHARGE FOLLOW-UP: ICD-10-CM

## 2024-05-03 DIAGNOSIS — R93.89 ABNORMAL FINDINGS ON DIAGNOSTIC IMAGING OF OTHER SPECIFIED BODY STRUCTURES: ICD-10-CM

## 2024-05-03 DIAGNOSIS — R79.9 ABNORMAL FINDING OF BLOOD CHEMISTRY, UNSPECIFIED: ICD-10-CM

## 2024-05-03 DIAGNOSIS — Z13.29 SCREENING FOR THYROID DISORDER: ICD-10-CM

## 2024-05-03 DIAGNOSIS — Z95.828 PORT-A-CATH IN PLACE: ICD-10-CM

## 2024-05-03 DIAGNOSIS — Z13.31 DEPRESSION SCREEN: ICD-10-CM

## 2024-05-03 DIAGNOSIS — A41.9 SEPSIS WITHOUT ACUTE ORGAN DYSFUNCTION, DUE TO UNSPECIFIED ORGANISM: ICD-10-CM

## 2024-05-03 DIAGNOSIS — R59.0 MEDIASTINAL ADENOPATHY: ICD-10-CM

## 2024-05-03 DIAGNOSIS — D72.829 LEUKOCYTOSIS, UNSPECIFIED TYPE: ICD-10-CM

## 2024-05-03 DIAGNOSIS — Z76.89 ENCOUNTER FOR SUPPORT AND COORDINATION OF TRANSITION OF CARE: ICD-10-CM

## 2024-05-03 DIAGNOSIS — I10 PRIMARY HYPERTENSION: ICD-10-CM

## 2024-05-03 LAB
25(OH)D3 SERPL-MCNC: 25.7 NG/ML (ref 30–100)
ALBUMIN UR-MCNC: 9 MG/DL
BACTERIA UR QL AUTO: NORMAL /HPF
BASOPHILS # BLD AUTO: 0.1 10*3/MM3 (ref 0–0.2)
BASOPHILS NFR BLD AUTO: 0.8 % (ref 0–1.5)
BILIRUB UR QL STRIP: NEGATIVE
CLARITY UR: CLEAR
COLOR UR: YELLOW
DEPRECATED RDW RBC AUTO: 52.2 FL (ref 37–54)
EOSINOPHIL # BLD AUTO: 0.17 10*3/MM3 (ref 0–0.4)
EOSINOPHIL NFR BLD AUTO: 1.3 % (ref 0.3–6.2)
ERYTHROCYTE [DISTWIDTH] IN BLOOD BY AUTOMATED COUNT: 15.3 % (ref 12.3–15.4)
GLUCOSE UR STRIP-MCNC: NEGATIVE MG/DL
HBA1C MFR BLD: 6.3 % (ref 4.8–5.6)
HCT VFR BLD AUTO: 34.7 % (ref 37.5–51)
HCV AB SER QL: NORMAL
HGB BLD-MCNC: 11.1 G/DL (ref 13–17.7)
HGB UR QL STRIP.AUTO: NEGATIVE
HOLD SPECIMEN: NORMAL
HYALINE CASTS UR QL AUTO: NORMAL /LPF
IMM GRANULOCYTES # BLD AUTO: 0.19 10*3/MM3 (ref 0–0.05)
IMM GRANULOCYTES NFR BLD AUTO: 1.5 % (ref 0–0.5)
KETONES UR QL STRIP: NEGATIVE
LEUKOCYTE ESTERASE UR QL STRIP.AUTO: NEGATIVE
LYMPHOCYTES # BLD AUTO: 1.89 10*3/MM3 (ref 0.7–3.1)
LYMPHOCYTES NFR BLD AUTO: 14.7 % (ref 19.6–45.3)
MCH RBC QN AUTO: 29.8 PG (ref 26.6–33)
MCHC RBC AUTO-ENTMCNC: 32 G/DL (ref 31.5–35.7)
MCV RBC AUTO: 93.3 FL (ref 79–97)
MONOCYTES # BLD AUTO: 1.54 10*3/MM3 (ref 0.1–0.9)
MONOCYTES NFR BLD AUTO: 12 % (ref 5–12)
NEUTROPHILS NFR BLD AUTO: 69.7 % (ref 42.7–76)
NEUTROPHILS NFR BLD AUTO: 8.95 10*3/MM3 (ref 1.7–7)
NITRITE UR QL STRIP: NEGATIVE
NRBC BLD AUTO-RTO: 0 /100 WBC (ref 0–0.2)
PH UR STRIP.AUTO: 6.5 [PH] (ref 5–8)
PLATELET # BLD AUTO: 326 10*3/MM3 (ref 140–450)
PMV BLD AUTO: 9 FL (ref 6–12)
PROT UR QL STRIP: ABNORMAL
RBC # BLD AUTO: 3.72 10*6/MM3 (ref 4.14–5.8)
RBC # UR STRIP: NORMAL /HPF
REF LAB TEST METHOD: NORMAL
SP GR UR STRIP: 1.01 (ref 1–1.03)
SQUAMOUS #/AREA URNS HPF: NORMAL /HPF
TESTOST FREE SERPL-MCNC: 5.2 PG/ML (ref 6.6–18.1)
TESTOST SERPL-MCNC: 157 NG/DL (ref 264–916)
UROBILINOGEN UR QL STRIP: ABNORMAL
VIT B12 BLD-MCNC: 491 PG/ML (ref 211–946)
WBC # UR STRIP: NORMAL /HPF
WBC NRBC COR # BLD AUTO: 12.84 10*3/MM3 (ref 3.4–10.8)

## 2024-05-03 PROCEDURE — 80053 COMPREHEN METABOLIC PANEL: CPT

## 2024-05-03 PROCEDURE — 82607 VITAMIN B-12: CPT

## 2024-05-03 PROCEDURE — 82746 ASSAY OF FOLIC ACID SERUM: CPT

## 2024-05-03 PROCEDURE — 82043 UR ALBUMIN QUANTITATIVE: CPT

## 2024-05-03 PROCEDURE — 80061 LIPID PANEL: CPT

## 2024-05-03 PROCEDURE — 85025 COMPLETE CBC W/AUTO DIFF WBC: CPT

## 2024-05-03 PROCEDURE — 83036 HEMOGLOBIN GLYCOSYLATED A1C: CPT

## 2024-05-03 PROCEDURE — 83540 ASSAY OF IRON: CPT

## 2024-05-03 PROCEDURE — 84443 ASSAY THYROID STIM HORMONE: CPT

## 2024-05-03 PROCEDURE — 82306 VITAMIN D 25 HYDROXY: CPT

## 2024-05-03 PROCEDURE — 81001 URINALYSIS AUTO W/SCOPE: CPT

## 2024-05-03 PROCEDURE — 86803 HEPATITIS C AB TEST: CPT

## 2024-05-03 PROCEDURE — 83735 ASSAY OF MAGNESIUM: CPT

## 2024-05-03 NOTE — PROGRESS NOTES
Transitional Care Follow Up Visit  Subjective     David Barfield is a 74 y.o. male who presents for a transitional care management visit.    Within 48 business hours after discharge our office contacted him via telephone to coordinate his care and needs.      I reviewed and discussed the details of that call along with the discharge summary, hospital problems, inpatient lab results, inpatient diagnostic studies, and consultation reports with David.     Current outpatient and discharge medications have been reconciled for the patient.  Reviewed by: ANAMIKA Appiah          4/29/2024     4:12 PM   Date of TCM Phone Call   [facility]   Date of Admission 4/24/2024   Date of Discharge 4/29/2024   Discharge Disposition Home or Self Care     Risk for Readmission (LACE) Score: 12 (4/29/2024  6:00 AM)      History of Present Illness  Course During Hospital Stay: Patient presents today for TCM/hospital discharge follow-up    Patient was admitted on April 24 and discharged on April 29    Patient has a noted history of non-small cell lung cancer status post neoadjuvant chemoimmunotherapy and right lower lung lobectomy currently on Opdivo.  He does have history of hypertension, emphysema, port placement, pacemaker.  Patient also had a recent admission on April 12 through April 22 for sepsis due to pyelonephritis.  He came back with vague abdominal pain, nausea vomiting, weakness.  CT abdomen pelvis with no acute findings.  Improved perinephritic fat stranding about the bilateral kidneys.  Plans were for home oral antibiotics.  He will follow-up with PCP for repeat labs on May 3 and follow-up with infectious disease at [place] [facility] on May 6 for follow-up and reassessment.    Plans were to complete IV antibiotics and transition to p.o. Levaquin every 48 hours and close outpatient follow-up with PCP with repeat labs on May 3 and infectious disease on May 6.    Blood pressure was  noted to be on the lower side during admission so started on midodrine and IV fluids.  Blood pressures improved.  Stopped midodrine and continue to monitor.  Once his blood pressure starts to increase again he can restart amlodipine.    Patient did have anemia.  He does follow with hematology.  He did receive 1 unit of red blood cells.          Patient also presents today to establish care with new provider    Specialist include: Infectious disease with an appointment on Monday  Hematology/oncology  Cardiology  Pulmonology    Patient is a former smoker.  He smoked from age 17 to age 74.  1 pack/day.  No alcohol intake or drug use  Patient describes his diet and lifestyle is healthy    Care gaps reviewed prior to today's visit.  Most vaccines are up-to-date.  Recommendations for tetanus shot and shingles vaccination.  Discussed we will address this in the future as he is feeling better.  He did have an occult blood test that was negative.  No noted recent colonoscopy  CT of the chest and AAA screening were both completed April 2024.  Patient is not diabetic.      Wife stated that they wanted patient to be seen in follow-up today with labs.  She stated they wanted multiple labs obtained including those related to his vitamins as well as magnesium.    Patient does have noted history of hypertension with use of amlodipine.    He also has hyperlipidemia with use of pravastatin.    GERD with use of Prilosec      Patient does see hematology/oncology for non-small cell lung cancer.  He has had a right lower lobe lobectomy.  Patient is currently receiving treatment and so far tolerating well.  It appears he also has a history of a rib fracture with the use of Lortab as needed.  Patient is intolerant to antidepressants.  It is noted that labs related to B12, folate, ferritin, iron profile, SPEP were normal.  He suspects that the anemia is likely multifactorial due to prior chemotherapy, recent surgeries, infections.  Offered  a bone marrow biopsy.    Patient sees pulmonology as well for his malignant neoplasm of the right lower lobe and centrilobular emphysema.  He also has some mediastinal adenopathy.  -Patient is currently using Trelegy and albuterol.         Patient has been erroneously marked as diabetic. Based on the available clinical information, he does not have diabetes and should therefore be excluded from diabetic health maintenance and quality measures for the remainder of the reporting period.    Patient is also seeing cardiology with history of hypertension hyperlipidemia.  He is also status post AV block with pacemaker placement.  Patient also has Port-A-Cath in place        The following portions of the patient's history were reviewed and updated as appropriate: allergies, current medications, past family history, past medical history, past social history, past surgical history, and problem list.        Objective   Vitals:    05/03/24 1550   BP: 124/60   Pulse: 80   Resp: 18   Temp: 97.4 °F (36.3 °C)   SpO2: 98%     Body mass index is 23.24 kg/m².     PHQ-9 Depression Screening  Little interest or pleasure in doing things? 0-->not at all   Feeling down, depressed, or hopeless? 0-->not at all   Trouble falling or staying asleep, or sleeping too much?     Feeling tired or having little energy?     Poor appetite or overeating?     Feeling bad about yourself - or that you are a failure or have let yourself or your family down?     Trouble concentrating on things, such as reading the newspaper or watching television?     Moving or speaking so slowly that other people could have noticed? Or the opposite - being so fidgety or restless that you have been moving around a lot more than usual?     Thoughts that you would be better off dead, or of hurting yourself in some way?     PHQ-9 Total Score 0   If you checked off any problems, how difficult have these problems made it for you to do your work, take care of things at home, or  get along with other people?       PHQ-9 Total Score: 0         Physical Exam  Vitals and nursing note reviewed.   Constitutional:       Appearance: Normal appearance.   HENT:      Head: Normocephalic and atraumatic.   Eyes:      Extraocular Movements: Extraocular movements intact.      Pupils: Pupils are equal, round, and reactive to light.   Cardiovascular:      Rate and Rhythm: Normal rate and regular rhythm.      Heart sounds: Normal heart sounds.   Pulmonary:      Effort: Pulmonary effort is normal.      Breath sounds: Normal breath sounds.      Comments: Right lower lobe lobectomy.  Musculoskeletal:         General: Normal range of motion.   Skin:     General: Skin is warm and dry.   Neurological:      Mental Status: He is alert and oriented to person, place, and time.   Psychiatric:         Mood and Affect: Mood normal.         Behavior: Behavior normal.         Assessment & Plan   Diagnoses and all orders for this visit:    1. Encounter for support and coordination of transition of care (Primary)  -     TSH Rfx On Abnormal To Free T4; Future    2. Hospital discharge follow-up    3. Establishing care with new doctor, encounter for    4. Sepsis without acute organ dysfunction, due to unspecified organism    5. Leukocytosis, unspecified type    6. Feeling tired  -     Vitamin D,25-Hydroxy; Future  -     Vitamin B12; Future  -     Folate; Future  -     Iron; Future  -     Magnesium; Future    7. Encounter for hepatitis C screening test for low risk patient  -     Hepatitis C Antibody; Future    8. Primary hypertension  -     Comprehensive Metabolic Panel; Future  -     MicroAlbumin, Urine, Random - Urine, Clean Catch; Future  -     Urinalysis With Culture If Indicated -; Future  -     Magnesium; Future  -     CBC & Differential; Future    9. Mixed hyperlipidemia  -     Lipid Panel; Future    10. Screening for thyroid disorder    11. Screening for diabetes mellitus  -     Hemoglobin A1c; Future    12. Abnormal  finding of blood chemistry, unspecified  -     Iron; Future    13. Abnormal findings on diagnostic imaging of other specified body structures  -     TSH Rfx On Abnormal To Free T4; Future    14. Stage 3 chronic kidney disease, unspecified whether stage 3a or 3b CKD  -     Vitamin D,25-Hydroxy; Future    15. On statin therapy    16. Malignant neoplasm of lower lobe of right lung    17. GERD without esophagitis    18. Status post lobectomy of lung    19. Centrilobular emphysema    20. Mediastinal adenopathy    21. Status post placement of cardiac pacemaker    22. Port-A-Cath in place    23. BMI 23.0-23.9, adult    24. Encounter for dietary counseling and surveillance    25. Depression screen    Plan  Establish care and hospital discharge follow-up/TCM completed with patient today    Patient and family member are aware that it was a very quick visit as they did need labs completed today.  Labs were ordered and will be obtained prior to them leaving the office.    They will plan to follow-up as scheduled with infectious disease, hematology/oncology, cardiology, pulmonology.    Patient is not diabetic so I did discontinue his eye exam and foot exam    Care gap recommendations of updated Tdap vaccine and shingles vaccine.  Will address in the future    Depression screen was negative results    Continue healthy lifestyle    Go to ER if any condition worsens or severe    Per recommendations from family member, we will plan to follow-up in 1 month for 30-minute visit           Return for 1 month follow up at 30 minutes.    ANAMIKA Appiah     Part of this note may be an electronic transcription/translation of spoken language to printed text using the Dragon Dictation System.

## 2024-05-04 LAB
ALBUMIN SERPL-MCNC: 4.1 G/DL (ref 3.5–5.2)
ALBUMIN/GLOB SERPL: 1.1 G/DL
ALP SERPL-CCNC: 89 U/L (ref 39–117)
ALT SERPL W P-5'-P-CCNC: 17 U/L (ref 1–41)
ANION GAP SERPL CALCULATED.3IONS-SCNC: 14.3 MMOL/L (ref 5–15)
AST SERPL-CCNC: 22 U/L (ref 1–40)
BILIRUB SERPL-MCNC: 0.3 MG/DL (ref 0–1.2)
BUN SERPL-MCNC: 18 MG/DL (ref 8–23)
BUN/CREAT SERPL: 8.1 (ref 7–25)
CALCIUM SPEC-SCNC: 10 MG/DL (ref 8.6–10.5)
CHLORIDE SERPL-SCNC: 102 MMOL/L (ref 98–107)
CHOLEST SERPL-MCNC: 157 MG/DL (ref 0–200)
CO2 SERPL-SCNC: 21.7 MMOL/L (ref 22–29)
CREAT SERPL-MCNC: 2.22 MG/DL (ref 0.76–1.27)
EGFRCR SERPLBLD CKD-EPI 2021: 30.1 ML/MIN/1.73
FOLATE SERPL-MCNC: 13.4 NG/ML (ref 4.78–24.2)
GLOBULIN UR ELPH-MCNC: 3.7 GM/DL
GLUCOSE SERPL-MCNC: 124 MG/DL (ref 65–99)
HDLC SERPL-MCNC: 27 MG/DL (ref 40–60)
IRON 24H UR-MRATE: 48 MCG/DL (ref 59–158)
LDLC SERPL CALC-MCNC: 94 MG/DL (ref 0–100)
LDLC/HDLC SERPL: 3.29 {RATIO}
MAGNESIUM SERPL-MCNC: 2 MG/DL (ref 1.6–2.4)
POTASSIUM SERPL-SCNC: 3.9 MMOL/L (ref 3.5–5.2)
PROT SERPL-MCNC: 7.8 G/DL (ref 6–8.5)
SODIUM SERPL-SCNC: 138 MMOL/L (ref 136–145)
TRIGL SERPL-MCNC: 206 MG/DL (ref 0–150)
TSH SERPL DL<=0.05 MIU/L-ACNC: 1.51 UIU/ML (ref 0.27–4.2)
VLDLC SERPL-MCNC: 36 MG/DL (ref 5–40)

## 2024-05-05 ENCOUNTER — APPOINTMENT (OUTPATIENT)
Dept: CT IMAGING | Facility: HOSPITAL | Age: 74
DRG: 871 | End: 2024-05-05
Payer: MEDICARE

## 2024-05-05 ENCOUNTER — HOSPITAL ENCOUNTER (INPATIENT)
Facility: HOSPITAL | Age: 74
LOS: 6 days | Discharge: HOME | DRG: 871 | End: 2024-05-12
Attending: EMERGENCY MEDICINE | Admitting: HOSPITALIST
Payer: MEDICARE

## 2024-05-05 ENCOUNTER — APPOINTMENT (OUTPATIENT)
Dept: GENERAL RADIOLOGY | Facility: HOSPITAL | Age: 74
DRG: 871 | End: 2024-05-05
Payer: MEDICARE

## 2024-05-05 DIAGNOSIS — I95.9 HYPOTENSION, UNSPECIFIED HYPOTENSION TYPE: Primary | ICD-10-CM

## 2024-05-05 DIAGNOSIS — R11.2 NAUSEA AND VOMITING, UNSPECIFIED VOMITING TYPE: ICD-10-CM

## 2024-05-05 DIAGNOSIS — R19.7 DIARRHEA, UNSPECIFIED TYPE: ICD-10-CM

## 2024-05-05 DIAGNOSIS — R68.83 CHILLS (WITHOUT FEVER): ICD-10-CM

## 2024-05-05 PROCEDURE — 0202U NFCT DS 22 TRGT SARS-COV-2: CPT | Performed by: EMERGENCY MEDICINE

## 2024-05-05 PROCEDURE — 99291 CRITICAL CARE FIRST HOUR: CPT

## 2024-05-05 PROCEDURE — 25010000002 ONDANSETRON PER 1 MG: Performed by: EMERGENCY MEDICINE

## 2024-05-05 PROCEDURE — 86900 BLOOD TYPING SEROLOGIC ABO: CPT | Performed by: EMERGENCY MEDICINE

## 2024-05-05 PROCEDURE — 83735 ASSAY OF MAGNESIUM: CPT | Performed by: EMERGENCY MEDICINE

## 2024-05-05 PROCEDURE — 85025 COMPLETE CBC W/AUTO DIFF WBC: CPT | Performed by: EMERGENCY MEDICINE

## 2024-05-05 PROCEDURE — 36415 COLL VENOUS BLD VENIPUNCTURE: CPT

## 2024-05-05 PROCEDURE — 83690 ASSAY OF LIPASE: CPT | Performed by: EMERGENCY MEDICINE

## 2024-05-05 PROCEDURE — 84145 PROCALCITONIN (PCT): CPT | Performed by: EMERGENCY MEDICINE

## 2024-05-05 PROCEDURE — 71045 X-RAY EXAM CHEST 1 VIEW: CPT

## 2024-05-05 PROCEDURE — 84484 ASSAY OF TROPONIN QUANT: CPT | Performed by: EMERGENCY MEDICINE

## 2024-05-05 PROCEDURE — 86850 RBC ANTIBODY SCREEN: CPT | Performed by: EMERGENCY MEDICINE

## 2024-05-05 PROCEDURE — 80053 COMPREHEN METABOLIC PANEL: CPT | Performed by: EMERGENCY MEDICINE

## 2024-05-05 PROCEDURE — 93005 ELECTROCARDIOGRAM TRACING: CPT | Performed by: EMERGENCY MEDICINE

## 2024-05-05 PROCEDURE — 85007 BL SMEAR W/DIFF WBC COUNT: CPT | Performed by: EMERGENCY MEDICINE

## 2024-05-05 PROCEDURE — 83605 ASSAY OF LACTIC ACID: CPT | Performed by: EMERGENCY MEDICINE

## 2024-05-05 PROCEDURE — 86901 BLOOD TYPING SEROLOGIC RH(D): CPT | Performed by: EMERGENCY MEDICINE

## 2024-05-05 RX ORDER — VANCOMYCIN/0.9 % SOD CHLORIDE 1.5G/250ML
20 PLASTIC BAG, INJECTION (ML) INTRAVENOUS ONCE
Status: COMPLETED | OUTPATIENT
Start: 2024-05-05 | End: 2024-05-06

## 2024-05-05 RX ORDER — ONDANSETRON 2 MG/ML
4 INJECTION INTRAMUSCULAR; INTRAVENOUS ONCE
Status: COMPLETED | OUTPATIENT
Start: 2024-05-05 | End: 2024-05-05

## 2024-05-05 RX ORDER — SODIUM CHLORIDE 0.9 % (FLUSH) 0.9 %
10 SYRINGE (ML) INJECTION AS NEEDED
Status: DISCONTINUED | OUTPATIENT
Start: 2024-05-05 | End: 2024-05-12 | Stop reason: HOSPADM

## 2024-05-05 RX ORDER — NOREPINEPHRINE BITARTRATE 0.03 MG/ML
.02-.3 INJECTION, SOLUTION INTRAVENOUS
Status: DISCONTINUED | OUTPATIENT
Start: 2024-05-05 | End: 2024-05-07

## 2024-05-05 RX ADMIN — ONDANSETRON 4 MG: 2 INJECTION INTRAMUSCULAR; INTRAVENOUS at 23:47

## 2024-05-05 RX ADMIN — NOREPINEPHRINE BITARTRATE 0.02 MCG/KG/MIN: 0.03 INJECTION, SOLUTION INTRAVENOUS at 23:43

## 2024-05-06 ENCOUNTER — READMISSION MANAGEMENT (OUTPATIENT)
Dept: CALL CENTER | Facility: HOSPITAL | Age: 74
End: 2024-05-06
Payer: MEDICARE

## 2024-05-06 ENCOUNTER — APPOINTMENT (OUTPATIENT)
Dept: CT IMAGING | Facility: HOSPITAL | Age: 74
DRG: 871 | End: 2024-05-06
Payer: MEDICARE

## 2024-05-06 PROBLEM — N12 PYELONEPHRITIS: Status: ACTIVE | Noted: 2024-05-06

## 2024-05-06 PROBLEM — R65.21 SEPTIC SHOCK: Status: ACTIVE | Noted: 2024-04-12

## 2024-05-06 PROBLEM — I25.10 CAD (CORONARY ARTERY DISEASE): Status: ACTIVE | Noted: 2024-05-06

## 2024-05-06 LAB
ABO GROUP BLD: NORMAL
ALBUMIN SERPL-MCNC: 3.2 G/DL (ref 3.5–5.2)
ALBUMIN/GLOB SERPL: 0.8 G/DL
ALP SERPL-CCNC: 76 U/L (ref 39–117)
ALT SERPL W P-5'-P-CCNC: 22 U/L (ref 1–41)
ANION GAP SERPL CALCULATED.3IONS-SCNC: 14 MMOL/L (ref 5–15)
ANION GAP SERPL CALCULATED.3IONS-SCNC: 15 MMOL/L (ref 5–15)
AST SERPL-CCNC: 30 U/L (ref 1–40)
B PARAPERT DNA SPEC QL NAA+PROBE: NOT DETECTED
B PERT DNA SPEC QL NAA+PROBE: NOT DETECTED
BACTERIA UR QL AUTO: ABNORMAL /HPF
BASOPHILS # BLD AUTO: 0.14 10*3/MM3 (ref 0–0.2)
BASOPHILS NFR BLD AUTO: 0.4 % (ref 0–1.5)
BILIRUB SERPL-MCNC: 0.4 MG/DL (ref 0–1.2)
BILIRUB UR QL STRIP: NEGATIVE
BLD GP AB SCN SERPL QL: NEGATIVE
BUN BLDA-MCNC: 23 MG/DL (ref 8–26)
BUN SERPL-MCNC: 23 MG/DL (ref 8–23)
BUN SERPL-MCNC: 28 MG/DL (ref 8–23)
BUN/CREAT SERPL: 7.6 (ref 7–25)
BUN/CREAT SERPL: 8.2 (ref 7–25)
C PNEUM DNA NPH QL NAA+NON-PROBE: NOT DETECTED
CA-I BLDA-SCNC: 1.19 MMOL/L (ref 1.2–1.32)
CALCIUM SPEC-SCNC: 8.4 MG/DL (ref 8.6–10.5)
CALCIUM SPEC-SCNC: 8.7 MG/DL (ref 8.6–10.5)
CHLORIDE BLDA-SCNC: 108 MMOL/L (ref 98–109)
CHLORIDE SERPL-SCNC: 105 MMOL/L (ref 98–107)
CHLORIDE SERPL-SCNC: 106 MMOL/L (ref 98–107)
CK SERPL-CCNC: 12 U/L (ref 20–200)
CLARITY UR: ABNORMAL
CO2 BLDA-SCNC: 18 MMOL/L (ref 24–29)
CO2 SERPL-SCNC: 17 MMOL/L (ref 22–29)
CO2 SERPL-SCNC: 20 MMOL/L (ref 22–29)
COLOR UR: YELLOW
CORTIS SERPL-MCNC: 26.23 MCG/DL
CREAT BLDA-MCNC: 3.3 MG/DL (ref 0.6–1.3)
CREAT SERPL-MCNC: 2.8 MG/DL (ref 0.76–1.27)
CREAT SERPL-MCNC: 3.7 MG/DL (ref 0.76–1.27)
D-LACTATE SERPL-SCNC: 1.7 MMOL/L (ref 0.5–2)
D-LACTATE SERPL-SCNC: 2.1 MMOL/L (ref 0.5–2)
D-LACTATE SERPL-SCNC: 2.8 MMOL/L (ref 0.5–2)
D-LACTATE SERPL-SCNC: 3.8 MMOL/L (ref 0.5–2)
DEPRECATED RDW RBC AUTO: 58.4 FL (ref 37–54)
EGFRCR SERPLBLD CKD-EPI 2021: 16.3 ML/MIN/1.73
EGFRCR SERPLBLD CKD-EPI 2021: 18.7 ML/MIN/1.73
EGFRCR SERPLBLD CKD-EPI 2021: 22.8 ML/MIN/1.73
EOSINOPHIL # BLD AUTO: 0.05 10*3/MM3 (ref 0–0.4)
EOSINOPHIL NFR BLD AUTO: 0.1 % (ref 0.3–6.2)
EOSINOPHIL SPEC QL MICRO: 0 % EOS/100 CELLS (ref 0–0)
ERYTHROCYTE [DISTWIDTH] IN BLOOD BY AUTOMATED COUNT: 16.6 % (ref 12.3–15.4)
FLUAV SUBTYP SPEC NAA+PROBE: NOT DETECTED
FLUBV RNA ISLT QL NAA+PROBE: NOT DETECTED
GEN 5 2HR TROPONIN T REFLEX: 37 NG/L
GLOBULIN UR ELPH-MCNC: 3.9 GM/DL
GLUCOSE BLDC GLUCOMTR-MCNC: 101 MG/DL (ref 70–130)
GLUCOSE BLDC GLUCOMTR-MCNC: 115 MG/DL (ref 70–130)
GLUCOSE BLDC GLUCOMTR-MCNC: 122 MG/DL (ref 70–130)
GLUCOSE BLDC GLUCOMTR-MCNC: 150 MG/DL (ref 70–130)
GLUCOSE BLDC GLUCOMTR-MCNC: 88 MG/DL (ref 70–130)
GLUCOSE SERPL-MCNC: 114 MG/DL (ref 65–99)
GLUCOSE SERPL-MCNC: 128 MG/DL (ref 65–99)
GLUCOSE UR STRIP-MCNC: NEGATIVE MG/DL
HADV DNA SPEC NAA+PROBE: NOT DETECTED
HCOV 229E RNA SPEC QL NAA+PROBE: NOT DETECTED
HCOV HKU1 RNA SPEC QL NAA+PROBE: NOT DETECTED
HCOV NL63 RNA SPEC QL NAA+PROBE: NOT DETECTED
HCOV OC43 RNA SPEC QL NAA+PROBE: NOT DETECTED
HCT VFR BLD AUTO: 38.9 % (ref 37.5–51)
HCT VFR BLDA CALC: 40 % (ref 38–51)
HGB BLD-MCNC: 12 G/DL (ref 13–17.7)
HGB BLDA-MCNC: 13.6 G/DL (ref 12–17)
HGB UR QL STRIP.AUTO: ABNORMAL
HMPV RNA NPH QL NAA+NON-PROBE: NOT DETECTED
HOLD SPECIMEN: NORMAL
HPIV1 RNA ISLT QL NAA+PROBE: NOT DETECTED
HPIV2 RNA SPEC QL NAA+PROBE: NOT DETECTED
HPIV3 RNA NPH QL NAA+PROBE: NOT DETECTED
HPIV4 P GENE NPH QL NAA+PROBE: NOT DETECTED
HYALINE CASTS UR QL AUTO: ABNORMAL /LPF
IMM GRANULOCYTES # BLD AUTO: 0.34 10*3/MM3 (ref 0–0.05)
IMM GRANULOCYTES NFR BLD AUTO: 1 % (ref 0–0.5)
KETONES UR QL STRIP: NEGATIVE
L PNEUMO1 AG UR QL IA: NEGATIVE
LEUKOCYTE ESTERASE UR QL STRIP.AUTO: ABNORMAL
LIPASE SERPL-CCNC: 30 U/L (ref 13–60)
LYMPHOCYTES # BLD AUTO: 1.32 10*3/MM3 (ref 0.7–3.1)
LYMPHOCYTES NFR BLD AUTO: 3.9 % (ref 19.6–45.3)
M PNEUMO IGG SER IA-ACNC: NOT DETECTED
MAGNESIUM SERPL-MCNC: 1.4 MG/DL (ref 1.6–2.4)
MCH RBC QN AUTO: 29.4 PG (ref 26.6–33)
MCHC RBC AUTO-ENTMCNC: 30.8 G/DL (ref 31.5–35.7)
MCV RBC AUTO: 95.3 FL (ref 79–97)
MONOCYTES # BLD AUTO: 1.94 10*3/MM3 (ref 0.1–0.9)
MONOCYTES NFR BLD AUTO: 5.8 % (ref 5–12)
MRSA DNA SPEC QL NAA+PROBE: NEGATIVE
NEUTROPHILS NFR BLD AUTO: 29.67 10*3/MM3 (ref 1.7–7)
NEUTROPHILS NFR BLD AUTO: 88.8 % (ref 42.7–76)
NITRITE UR QL STRIP: NEGATIVE
NRBC BLD AUTO-RTO: 0 /100 WBC (ref 0–0.2)
PH UR STRIP.AUTO: 7 [PH] (ref 5–8)
PLAT MORPH BLD: NORMAL
PLATELET # BLD AUTO: 245 10*3/MM3 (ref 140–450)
PMV BLD AUTO: 9.5 FL (ref 6–12)
POTASSIUM BLDA-SCNC: 3.8 MMOL/L (ref 3.5–4.9)
POTASSIUM SERPL-SCNC: 4.1 MMOL/L (ref 3.5–5.2)
POTASSIUM SERPL-SCNC: 4.7 MMOL/L (ref 3.5–5.2)
POTASSIUM SERPL-SCNC: 5.2 MMOL/L (ref 3.5–5.2)
PROCALCITONIN SERPL-MCNC: 1.89 NG/ML (ref 0–0.25)
PROT ?TM UR-MCNC: 526.1 MG/DL
PROT SERPL-MCNC: 7.1 G/DL (ref 6–8.5)
PROT UR QL STRIP: ABNORMAL
RBC # BLD AUTO: 4.08 10*6/MM3 (ref 4.14–5.8)
RBC # UR STRIP: ABNORMAL /HPF
RBC MORPH BLD: NORMAL
REF LAB TEST METHOD: ABNORMAL
RENAL EPI CELLS #/AREA URNS HPF: ABNORMAL /HPF
RH BLD: NEGATIVE
RHINOVIRUS RNA SPEC NAA+PROBE: NOT DETECTED
RSV RNA NPH QL NAA+NON-PROBE: NOT DETECTED
S PNEUM AG SPEC QL LA: NEGATIVE
SARS-COV-2 RNA NPH QL NAA+NON-PROBE: NOT DETECTED
SODIUM BLD-SCNC: 140 MMOL/L (ref 138–146)
SODIUM SERPL-SCNC: 136 MMOL/L (ref 136–145)
SODIUM SERPL-SCNC: 141 MMOL/L (ref 136–145)
SODIUM UR-SCNC: 58 MMOL/L
SP GR UR STRIP: 1.01 (ref 1–1.03)
SQUAMOUS #/AREA URNS HPF: ABNORMAL /HPF
T&S EXPIRATION DATE: NORMAL
TRANS CELLS #/AREA URNS HPF: ABNORMAL /HPF
TROPONIN T DELTA: 7 NG/L
TROPONIN T SERPL HS-MCNC: 30 NG/L
UROBILINOGEN UR QL STRIP: ABNORMAL
WBC # UR STRIP: ABNORMAL /HPF
WBC MORPH BLD: NORMAL
WBC NRBC COR # BLD AUTO: 33.46 10*3/MM3 (ref 3.4–10.8)
WHOLE BLOOD HOLD COAG: NORMAL
WHOLE BLOOD HOLD SPECIMEN: NORMAL

## 2024-05-06 PROCEDURE — 84156 ASSAY OF PROTEIN URINE: CPT | Performed by: INTERNAL MEDICINE

## 2024-05-06 PROCEDURE — 82550 ASSAY OF CK (CPK): CPT | Performed by: INTERNAL MEDICINE

## 2024-05-06 PROCEDURE — 25010000002 CEFEPIME PER 500 MG: Performed by: NURSE PRACTITIONER

## 2024-05-06 PROCEDURE — 81001 URINALYSIS AUTO W/SCOPE: CPT | Performed by: NURSE PRACTITIONER

## 2024-05-06 PROCEDURE — 99291 CRITICAL CARE FIRST HOUR: CPT | Performed by: INTERNAL MEDICINE

## 2024-05-06 PROCEDURE — 82948 REAGENT STRIP/BLOOD GLUCOSE: CPT

## 2024-05-06 PROCEDURE — 25010000002 PIPERACILLIN SOD-TAZOBACTAM PER 1 G: Performed by: EMERGENCY MEDICINE

## 2024-05-06 PROCEDURE — 84540 ASSAY OF URINE/UREA-N: CPT | Performed by: INTERNAL MEDICINE

## 2024-05-06 PROCEDURE — 94799 UNLISTED PULMONARY SVC/PX: CPT

## 2024-05-06 PROCEDURE — 87449 NOS EACH ORGANISM AG IA: CPT | Performed by: NURSE PRACTITIONER

## 2024-05-06 PROCEDURE — 71250 CT THORAX DX C-: CPT

## 2024-05-06 PROCEDURE — 25810000003 LACTATED RINGERS PER 1000 ML: Performed by: NURSE PRACTITIONER

## 2024-05-06 PROCEDURE — 87086 URINE CULTURE/COLONY COUNT: CPT | Performed by: NURSE PRACTITIONER

## 2024-05-06 PROCEDURE — 80048 BASIC METABOLIC PNL TOTAL CA: CPT | Performed by: INTERNAL MEDICINE

## 2024-05-06 PROCEDURE — 87205 SMEAR GRAM STAIN: CPT | Performed by: INTERNAL MEDICINE

## 2024-05-06 PROCEDURE — 25810000003 SEPSIS FLUID NS 0.9 % SOLUTION: Performed by: EMERGENCY MEDICINE

## 2024-05-06 PROCEDURE — 83605 ASSAY OF LACTIC ACID: CPT | Performed by: EMERGENCY MEDICINE

## 2024-05-06 PROCEDURE — 87899 AGENT NOS ASSAY W/OPTIC: CPT | Performed by: NURSE PRACTITIONER

## 2024-05-06 PROCEDURE — 25010000002 HEPARIN (PORCINE) PER 1000 UNITS: Performed by: NURSE PRACTITIONER

## 2024-05-06 PROCEDURE — 87641 MR-STAPH DNA AMP PROBE: CPT | Performed by: NURSE PRACTITIONER

## 2024-05-06 PROCEDURE — 74176 CT ABD & PELVIS W/O CONTRAST: CPT

## 2024-05-06 PROCEDURE — 94761 N-INVAS EAR/PLS OXIMETRY MLT: CPT

## 2024-05-06 PROCEDURE — 25010000002 VANCOMYCIN HCL IN NACL 1.5-0.9 GM/500ML-% SOLUTION: Performed by: EMERGENCY MEDICINE

## 2024-05-06 PROCEDURE — 25010000002 MAGNESIUM SULFATE 4 GM/100ML SOLUTION: Performed by: NURSE PRACTITIONER

## 2024-05-06 PROCEDURE — 84132 ASSAY OF SERUM POTASSIUM: CPT | Performed by: NURSE PRACTITIONER

## 2024-05-06 PROCEDURE — 84300 ASSAY OF URINE SODIUM: CPT | Performed by: INTERNAL MEDICINE

## 2024-05-06 PROCEDURE — 87040 BLOOD CULTURE FOR BACTERIA: CPT | Performed by: EMERGENCY MEDICINE

## 2024-05-06 PROCEDURE — 80047 BASIC METABLC PNL IONIZED CA: CPT

## 2024-05-06 PROCEDURE — 94640 AIRWAY INHALATION TREATMENT: CPT

## 2024-05-06 PROCEDURE — 82570 ASSAY OF URINE CREATININE: CPT | Performed by: INTERNAL MEDICINE

## 2024-05-06 PROCEDURE — 84484 ASSAY OF TROPONIN QUANT: CPT | Performed by: EMERGENCY MEDICINE

## 2024-05-06 PROCEDURE — 82533 TOTAL CORTISOL: CPT | Performed by: NURSE PRACTITIONER

## 2024-05-06 PROCEDURE — 85014 HEMATOCRIT: CPT

## 2024-05-06 RX ORDER — POLYETHYLENE GLYCOL 3350 17 G/17G
17 POWDER, FOR SOLUTION ORAL DAILY PRN
Status: DISCONTINUED | OUTPATIENT
Start: 2024-05-06 | End: 2024-05-12 | Stop reason: HOSPADM

## 2024-05-06 RX ORDER — HYDROCODONE BITARTRATE AND ACETAMINOPHEN 7.5; 325 MG/1; MG/1
1 TABLET ORAL EVERY 6 HOURS PRN
Status: ACTIVE | OUTPATIENT
Start: 2024-05-06 | End: 2024-05-11

## 2024-05-06 RX ORDER — SODIUM CHLORIDE 0.9 % (FLUSH) 0.9 %
10 SYRINGE (ML) INJECTION AS NEEDED
Status: DISCONTINUED | OUTPATIENT
Start: 2024-05-06 | End: 2024-05-07 | Stop reason: SDUPTHER

## 2024-05-06 RX ORDER — MIDODRINE HYDROCHLORIDE 10 MG/1
10 TABLET ORAL EVERY 8 HOURS
Status: DISCONTINUED | OUTPATIENT
Start: 2024-05-06 | End: 2024-05-07

## 2024-05-06 RX ORDER — IBUPROFEN 600 MG/1
1 TABLET ORAL
Status: DISCONTINUED | OUTPATIENT
Start: 2024-05-06 | End: 2024-05-08

## 2024-05-06 RX ORDER — SODIUM CHLORIDE 9 MG/ML
40 INJECTION, SOLUTION INTRAVENOUS AS NEEDED
Status: DISCONTINUED | OUTPATIENT
Start: 2024-05-06 | End: 2024-05-12 | Stop reason: HOSPADM

## 2024-05-06 RX ORDER — SODIUM CHLORIDE 0.9 % (FLUSH) 0.9 %
10 SYRINGE (ML) INJECTION EVERY 12 HOURS SCHEDULED
Status: DISCONTINUED | OUTPATIENT
Start: 2024-05-06 | End: 2024-05-12 | Stop reason: HOSPADM

## 2024-05-06 RX ORDER — ALBUTEROL SULFATE 2.5 MG/3ML
2.5 SOLUTION RESPIRATORY (INHALATION) EVERY 6 HOURS PRN
Status: DISCONTINUED | OUTPATIENT
Start: 2024-05-06 | End: 2024-05-12 | Stop reason: HOSPADM

## 2024-05-06 RX ORDER — HEPARIN SODIUM 5000 [USP'U]/ML
5000 INJECTION, SOLUTION INTRAVENOUS; SUBCUTANEOUS EVERY 12 HOURS SCHEDULED
Status: DISCONTINUED | OUTPATIENT
Start: 2024-05-06 | End: 2024-05-12 | Stop reason: HOSPADM

## 2024-05-06 RX ORDER — MAGNESIUM SULFATE HEPTAHYDRATE 40 MG/ML
4 INJECTION, SOLUTION INTRAVENOUS ONCE
Status: COMPLETED | OUTPATIENT
Start: 2024-05-06 | End: 2024-05-06

## 2024-05-06 RX ORDER — ACETAMINOPHEN 650 MG/1
650 SUPPOSITORY RECTAL EVERY 4 HOURS PRN
Status: DISCONTINUED | OUTPATIENT
Start: 2024-05-06 | End: 2024-05-12 | Stop reason: HOSPADM

## 2024-05-06 RX ORDER — BISACODYL 10 MG
10 SUPPOSITORY, RECTAL RECTAL DAILY PRN
Status: DISCONTINUED | OUTPATIENT
Start: 2024-05-06 | End: 2024-05-12 | Stop reason: HOSPADM

## 2024-05-06 RX ORDER — SODIUM CHLORIDE, SODIUM LACTATE, POTASSIUM CHLORIDE, CALCIUM CHLORIDE 600; 310; 30; 20 MG/100ML; MG/100ML; MG/100ML; MG/100ML
50 INJECTION, SOLUTION INTRAVENOUS CONTINUOUS
Status: ACTIVE | OUTPATIENT
Start: 2024-05-06 | End: 2024-05-07

## 2024-05-06 RX ORDER — INSULIN LISPRO 100 [IU]/ML
2-7 INJECTION, SOLUTION INTRAVENOUS; SUBCUTANEOUS
Status: DISCONTINUED | OUTPATIENT
Start: 2024-05-06 | End: 2024-05-08

## 2024-05-06 RX ORDER — DEXTROSE MONOHYDRATE 25 G/50ML
25 INJECTION, SOLUTION INTRAVENOUS
Status: DISCONTINUED | OUTPATIENT
Start: 2024-05-06 | End: 2024-05-08

## 2024-05-06 RX ORDER — SODIUM CHLORIDE, SODIUM LACTATE, POTASSIUM CHLORIDE, CALCIUM CHLORIDE 600; 310; 30; 20 MG/100ML; MG/100ML; MG/100ML; MG/100ML
75 INJECTION, SOLUTION INTRAVENOUS CONTINUOUS
Status: DISCONTINUED | OUTPATIENT
Start: 2024-05-06 | End: 2024-05-06

## 2024-05-06 RX ORDER — SODIUM CHLORIDE, SODIUM LACTATE, POTASSIUM CHLORIDE, CALCIUM CHLORIDE 600; 310; 30; 20 MG/100ML; MG/100ML; MG/100ML; MG/100ML
50 INJECTION, SOLUTION INTRAVENOUS CONTINUOUS
Status: DISCONTINUED | OUTPATIENT
Start: 2024-05-06 | End: 2024-05-06

## 2024-05-06 RX ORDER — AMOXICILLIN 250 MG
2 CAPSULE ORAL 2 TIMES DAILY
Status: DISCONTINUED | OUTPATIENT
Start: 2024-05-06 | End: 2024-05-12 | Stop reason: HOSPADM

## 2024-05-06 RX ORDER — NITROGLYCERIN 0.4 MG/1
0.4 TABLET SUBLINGUAL
Status: DISCONTINUED | OUTPATIENT
Start: 2024-05-06 | End: 2024-05-12 | Stop reason: HOSPADM

## 2024-05-06 RX ORDER — ACETAMINOPHEN 325 MG/1
650 TABLET ORAL EVERY 4 HOURS PRN
Status: DISCONTINUED | OUTPATIENT
Start: 2024-05-06 | End: 2024-05-12 | Stop reason: HOSPADM

## 2024-05-06 RX ORDER — FAMOTIDINE 20 MG/1
20 TABLET, FILM COATED ORAL DAILY
Status: DISCONTINUED | OUTPATIENT
Start: 2024-05-06 | End: 2024-05-12 | Stop reason: HOSPADM

## 2024-05-06 RX ORDER — BISACODYL 5 MG/1
5 TABLET, DELAYED RELEASE ORAL DAILY PRN
Status: DISCONTINUED | OUTPATIENT
Start: 2024-05-06 | End: 2024-05-12 | Stop reason: HOSPADM

## 2024-05-06 RX ORDER — BUDESONIDE AND FORMOTEROL FUMARATE DIHYDRATE 160; 4.5 UG/1; UG/1
2 AEROSOL RESPIRATORY (INHALATION)
Status: DISCONTINUED | OUTPATIENT
Start: 2024-05-06 | End: 2024-05-12 | Stop reason: HOSPADM

## 2024-05-06 RX ORDER — NICOTINE POLACRILEX 4 MG
15 LOZENGE BUCCAL
Status: DISCONTINUED | OUTPATIENT
Start: 2024-05-06 | End: 2024-05-08

## 2024-05-06 RX ADMIN — MAGNESIUM SULFATE IN WATER FOR 4 G: 40 INJECTION INTRAVENOUS at 03:32

## 2024-05-06 RX ADMIN — SODIUM CHLORIDE, POTASSIUM CHLORIDE, SODIUM LACTATE AND CALCIUM CHLORIDE 75 ML/HR: 600; 310; 30; 20 INJECTION, SOLUTION INTRAVENOUS at 03:47

## 2024-05-06 RX ADMIN — Medication 10 ML: at 09:07

## 2024-05-06 RX ADMIN — PIPERACILLIN AND TAZOBACTAM 3.38 G: 3; .375 INJECTION, POWDER, LYOPHILIZED, FOR SOLUTION INTRAVENOUS at 01:33

## 2024-05-06 RX ADMIN — MIDODRINE HYDROCHLORIDE 10 MG: 10 TABLET ORAL at 20:12

## 2024-05-06 RX ADMIN — SODIUM CHLORIDE 2361 ML: 9 INJECTION, SOLUTION INTRAVENOUS at 02:07

## 2024-05-06 RX ADMIN — TIOTROPIUM BROMIDE INHALATION SPRAY 2 PUFF: 3.12 SPRAY, METERED RESPIRATORY (INHALATION) at 09:39

## 2024-05-06 RX ADMIN — SODIUM CHLORIDE, POTASSIUM CHLORIDE, SODIUM LACTATE AND CALCIUM CHLORIDE 50 ML/HR: 600; 310; 30; 20 INJECTION, SOLUTION INTRAVENOUS at 22:27

## 2024-05-06 RX ADMIN — Medication 1500 MG: at 02:07

## 2024-05-06 RX ADMIN — CEFEPIME 2000 MG: 2 INJECTION, POWDER, FOR SOLUTION INTRAVENOUS at 08:57

## 2024-05-06 RX ADMIN — MIDODRINE HYDROCHLORIDE 10 MG: 10 TABLET ORAL at 13:11

## 2024-05-06 RX ADMIN — MIDODRINE HYDROCHLORIDE 10 MG: 10 TABLET ORAL at 05:06

## 2024-05-06 RX ADMIN — BUDESONIDE AND FORMOTEROL FUMARATE DIHYDRATE 2 PUFF: 160; 4.5 AEROSOL RESPIRATORY (INHALATION) at 19:44

## 2024-05-06 RX ADMIN — HEPARIN SODIUM 5000 UNITS: 5000 INJECTION INTRAVENOUS; SUBCUTANEOUS at 08:57

## 2024-05-06 RX ADMIN — FAMOTIDINE 20 MG: 20 TABLET, FILM COATED ORAL at 08:57

## 2024-05-06 RX ADMIN — Medication 10 ML: at 20:12

## 2024-05-06 RX ADMIN — HEPARIN SODIUM 5000 UNITS: 5000 INJECTION INTRAVENOUS; SUBCUTANEOUS at 20:12

## 2024-05-06 RX ADMIN — BUDESONIDE AND FORMOTEROL FUMARATE DIHYDRATE 2 PUFF: 160; 4.5 AEROSOL RESPIRATORY (INHALATION) at 09:40

## 2024-05-06 NOTE — CONSULTS
[facility]  Renal Consult Note    David Barfield  1950  [id number]    Date of Admit:  5/5/2024    Date of Consult: 5/6/2024    Requesting Provider: Dr. Summers    Evaluating Physician: Kam Gomez MD    Chief Complaint: Weakness    Reason for Consultation: Elevated creatinine    History of present illness:    Patient is a 74 y.o.  Yr old male history of recent ARF.  Patient presented with increased weakness and hypotension.  Creatinine elevated at 2.8.  Per patient he had been back to baseline since after recent admission.  Reports normal p.o. intake.  Urine had been light yellow.  Blood pressure had been running in the 130s.  Last night patient developed sudden onset of chills but did not have a fever when checked.  Reports blood pressure dropped into the 70s systolic.  Afterwards patient had bout of nausea vomiting and diarrhea.  Called EMS and was brought to the ER.  Here blood pressure was low dropping down into the 60s/40s.  Creatinine was elevated at 2.8.  Patient was given IV fluids and started on pressors.  CT scan with perinephric stranding concerning for possible pyelonephritis.  Overnight blood pressure improved, however creatinine increased to 3.7.  Nephrology was consulted to evaluate.  Patient is feeling much better today post fluids.  Denies any chest pain or shortness of breath.  Denies any rashes.  Denies any urinary symptoms.  Denies any flank pain.  Does report decreased urine output since dropping blood pressure yesterday.  No other complaints.      Past Medical History  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    Coronary artery disease 2019    Diabetes mellitus Borderline    Emphysema/COPD     Erectile disorder     GERD (gastroesophageal reflux disease)     History of chemotherapy     Hyperlipidemia     Hypertension 2019    Lung  "nodule     Mumps     Mumps     Pruritus     after bath    Slow to wake up after anesthesia     Wears dentures     upper only    Wears hearing aid in both ears     usually only wears right       Past Surgical History:   Procedure Laterality Date    BONE BIOPSY      broken bone surgery in his face    BRONCHOSCOPY THORACOTOMY Right 01/09/2024    Procedure: THORACOTOMY FOR LOWER LOBECTOMY AND MEDISTINAL LYMPH NODE DISSECTION RIGHT;  Surgeon: Joey Patel MD;  Location:  [facility];  Service: Cardiothoracic;  Laterality: Right;    BRONCHOSCOPY WITH ION ROBOTIC ASSIST N/A 09/15/2023    Procedure: BRONCHOSCOPY NAVIGATION WITH ENDOBRONCHIAL ULTRASOUND AND ION ROBOT;  Surgeon: Octaviano Sampson MD;  Location:  [facility];  Service: Robotics - Pulmonary;  Laterality: N/A;  ion #6 - 0032  - 0015  Cath guide 0061    EBUS balloon removed and intact    CARDIAC ELECTROPHYSIOLOGY PROCEDURE N/A 08/17/2021    Procedure: Pacemaker DC new;  Surgeon: Kayy Box MD;  Location:  [facility];  Service: Cardiology;  Laterality: N/A;    FACIAL FRACTURE SURGERY      LYMPH NODE BIOPSY  2023    PACEMAKER IMPLANTATION         Allergies:  Allergies   Allergen Reactions    Cymbalta [Duloxetine Hcl] GI Intolerance    Gabapentin Mental Status Change     Pt states that this medication \"makes him feel foolish in his head\".     Remeron [Mirtazapine] Other (See Comments)     Excess sedation    Toradol [Ketorolac Tromethamine] GI Intolerance     Projectile vomiting     Latex Other (See Comments)     Latex allergy     Tape Rash       Medication:   See electronic record    Soc Hx:   Social History     Socioeconomic History    Marital status: Significant Other    Number of children: 3   Tobacco Use    Smoking status: Every Day     Current packs/day: 0.50     Average packs/day: 0.5 packs/day for 56.3 years (28.7 ttl pk-yrs)     Types: Cigarettes     Start date: 1/1/1968    Smokeless tobacco: Never    Tobacco " "comments:     Still smoke   Vaping Use    Vaping status: Never Used   Substance and Sexual Activity    Alcohol use: Never    Drug use: Never    Sexual activity: Yes     Partners: Female     Birth control/protection: None       Fam Hx:  No congenital renal disease      Review of Systems:  Full review of systems reviewed and are as above  In HPI or per admitting H&P,otherwise negative for acute complaints    Physical Exam:   Vital Signs   Blood pressure 115/74, pulse 72, temperature 97.6 °F (36.4 °C), temperature source Axillary, resp. rate 20, height 182.9 cm (72\"), weight 78.7 kg (173 lb 9.6 oz), SpO2 99%.  05/05 0701 - 05/06 0700  In: 1687.5 [I.V.:287.5]  Out: 100 [Urine:100]    GENERAL: WD WM NAD  NEURO: Awake and alert, oriented. No focal deficit  PSYCHIATRIC: NMA. Cooperative with PE  EYE: PE, no icterus, no conjunctivitis  ENT: ommm, dentition intact,  Hearing intact  NECK:  No JVD discernable,  Trachea midline  CV: No edema, RRR  LUNGS:  Quiet,  Nonlabored resp.  Symmetrical expansion  ABDOMEN: Nondistended, soft nontender.  : No Yeung, no palp bladder  SKIN: Warm and dry without rash    Laboratory Data  Results from last 7 days   Lab Units 05/06/24  0035 05/05/24 2352 05/03/24  1632   HEMOGLOBIN g/dL  --  12.0* 11.1*   HEMOGLOBIN, POC g/dL 13.6  --   --    HEMATOCRIT %  --  38.9 34.7*   HEMATOCRIT POC % 40  --   --      Results from last 7 days   Lab Units 05/06/24  1213 05/06/24  0909 05/06/24  0035 05/05/24 2352 05/03/24  1632   SODIUM mmol/L  --  136  --  141 138   POTASSIUM mmol/L 4.7 5.2  --  4.1 3.9   CHLORIDE mmol/L  --  105  --  106 102   CO2 mmol/L  --  17.0*  --  20.0* 21.7*   BUN mg/dL  --  28*  --  23 18   CREATININE mg/dL  --  3.70* 3.30* 2.80* 2.22*   CALCIUM mg/dL  --  8.4*  --  8.7 10.0   MAGNESIUM mg/dL  --   --   --  1.4* 2.0   ALBUMIN g/dL  --   --   --  3.2* 4.1     Results from last 7 days   Lab Units 05/06/24  1206   COLOR UA  Yellow   CLARITY UA  Cloudy*   PH, URINE  7.0   SPECIFIC " GRAVITY, URINE  1.012   GLUCOSE UA  Negative   KETONES UA  Negative   BILIRUBIN UA  Negative   PROTEIN UA  >=300 mg/dL (3+)*   BLOOD UA  Trace*   LEUKOCYTES UA  Small (1+)*   NITRITE UA  Negative     Results from last 7 days   Lab Units 05/05/24  2352   ALK PHOS U/L 76   BILIRUBIN mg/dL 0.4   ALT (SGPT) U/L 22   AST (SGOT) U/L 30         Estimated Creatinine Clearance: 19.2 mL/min (A) (by C-G formula based on SCr of 3.7 mg/dL (H)).  Lab Results   Component Value Date    CREATININEUR 38.1 04/13/2024    NAUR 74 04/13/2024    UREAUR 149 04/13/2024       A/P:        ARF: Creatinine back up to 3.7.  Urine output oliguric.  Initially seen by us 4/13/2024 with a creatinine of 2.2 and elevated white count associated with acute onset flank pain with weakness nausea vomiting diarrhea which started after recent cycle of immunotherapy for lung cancer with Opdivo (last dose 4/4/2024).  CT at that time showed bilateral perinephric stranding and circumferential bladder wall thickening.  Patient treated for presumed pyelonephritis and UTI.  Creatinine peaked at 8.5 with improvement down to 4.2 on discharge 4/22/2024.  Patient readmitted 2 days later with recurrent weakness and GI upset.  With creatinine down to 3.2 with further improvement down to 2.0 on discharge.  Creatinine creatinine back up to 2.8 on admission here rising to 3.7 overnight with  profound hypotension on admission.  Patient likely with recurrence of ATN injury due to hemodynamic instability.  Blood pressure improved.  Hopefully will see creatinine plateau and improve further.  For now would continue supportive care.  Will get repeat urine indices.  Monitor renal function closely.   No indication for dialysis at this time.    Sepsis: Patient with sudden onset of chills and profound hypotension.  Required pressors and volume for blood pressure stabilization overnight.  Patient has been able to wean off pressors.  Underlying etiology still in  question.    Electrolytes: Stable for now.  Monitor    Metabolic acidosis: Bicarb quite low.  Patient initially with elevated lactic acid which has improved from 3.8 down to 1.7 this morning.  For now continue perfusion support.    Volume: No edema.  Chest x-ray stable.  Patient on room air.  Continue IV fluids for now.  Strict I's and O's.    ID: Patient presented with septic picture.  Was on Levaquin as outpatient.  CT scan with possible pyelonephritis but cultures have been negative previously.  ID continues to evaluate.    Leukocytosis: Presumably due to underlying infectious process.  Was present last admission with resolution at time of discharge.    GI upset: Has been recurrent over the past month with recurrent admissions.  Currently resolved.    History of small cell lung cancer: Post immuno therapy with Opdivo.  Last infusion 4/4/2024.  Follows with Dr. Ashley.     Thank you consulting us on David Barfield who is of high risk and complexity.  We will follow along closely    Kam Gomez MD  5/6/2024  13:06 EDT

## 2024-05-06 NOTE — CASE MANAGEMENT/SOCIAL WORK
Discharge Planning Assessment  [facility]     Patient Name: David Barfield  MRN: [id number]  Today's Date: 5/6/2024    Admit Date: 5/5/2024    Plan: IDP   Discharge Needs Assessment       Row Name 05/06/24 1433       Living Environment    People in Home significant other    Name(s) of People in Home ISAÍAS ANDERSON (Significant Other)  [phone] (Mobile)    Current Living Arrangements home    Potentially Unsafe Housing Conditions none    In the past 12 months has the electric, gas, oil, or water company threatened to shut off services in your home? No    Primary Care Provided by self    Provides Primary Care For no one    Family Caregiver if Needed significant other    Family Caregiver Names ISAÍAS ANDERSON (Significant Other)  [phone] (Mobile)    Quality of Family Relationships unable to assess    Able to Return to Prior Arrangements yes       Resource/Environmental Concerns    Transportation Concerns none       Transportation Needs    In the past 12 months, has lack of transportation kept you from medical appointments or from getting medications? no    In the past 12 months, has lack of transportation kept you from meetings, work, or from getting things needed for daily living? No       Food Insecurity    Within the past 12 months, you worried that your food would run out before you got the money to buy more. Never true    Within the past 12 months, the food you bought just didn't last and you didn't have money to get more. Never true       Transition Planning    Patient/Family Anticipates Transition to home with family    Patient/Family Anticipated Services at Transition none    Transportation Anticipated family or friend will provide       Discharge Needs Assessment    Readmission Within the Last 30 Days current reason for admission unrelated to previous admission    Equipment Currently Used at Home none    Concerns to be Addressed denies needs/concerns at this time    Anticipated Changes Related  to Illness none    Equipment Needed After Discharge none    Current Discharge Risk chronically ill                   Discharge Plan       Row Name 05/06/24 1434       Plan    Plan IDP    Patient/Family in Agreement with Plan yes    Plan Comments I spoke with the patient at the bedside. Patient stated that he lives in [place] [place] in a house with his friend, Balaji. He is independent, able to drive, does not use medical equipment, and is not current with home or outpatient services. He confirmed his insurance is Medicare [employer] and his PCP is Hayley Castellanos. He is planning to discharge home with Balaji or family to transport. He denied any needs from CM at this time. Cm following.    Final Discharge Disposition Code 01 - home or self-care                  Continued Care and Services - Admitted Since 5/5/2024    No active coordination exists for this encounter.          Demographic Summary    No documentation.                  Functional Status       Row Name 05/06/24 1431       Functional Status    Usual Activity Tolerance good    Current Activity Tolerance other (see comments)  see therapy notes       Physical Activity    On average, how many days per week do you engage in moderate to strenuous exercise (like a brisk walk)? 2 days    On average, how many minutes do you engage in exercise at this level? 20 min    Number of minutes of exercise per week 40       Assessment of Health Literacy    How often do you have someone help you read hospital materials? Never    How often do you have problems learning about your medical condition because of difficulty understanding written information? Never    How often do you have a problem understanding what is told to you about your medical condition? Never    How confident are you filling out medical forms by yourself? Quite a bit    Health Literacy Good       Functional Status, IADL    Medications independent    Meal Preparation independent    Housekeeping independent     Laundry independent    Shopping independent       Mental Status    General Appearance WDL WDL       Mental Status Summary    Recent Changes in Mental Status/Cognitive Functioning no changes       Employment/    Employment Status retired                   Psychosocial    No documentation.                  Abuse/Neglect    No documentation.                  Legal    No documentation.                  Substance Abuse    No documentation.                  Patient Forms    No documentation.                     Tricia Decker RN

## 2024-05-06 NOTE — CONSULTS
INFECTIOUS DISEASE CONSULT/INITIAL HOSPITAL VISIT    David Barfield  1950  [id number]    Date of consult: 5/6/2024    Admit date: 5/5/2024    Requesting Provider: Abhishek Alan APRN   Evaluating physician: Derian Barry MD  Reason for Consultation: Recent treatment  Chief Complaint: Vague abdominal pain      Subjective   History of present illness:  David Barfield is a  74 y.o.  Yr old male with PMH NSCLC/RLL lobectomy (1/2024) recently on Opdivo (last dose on 4/4/24), HTN, and emphysema who I have followed during multiple recent admissions after he presented with vague abdominal pain, Nausea, vomiting, Diarrhea, severe leukocytosis with neutrophilia, and recent renal dysfunction.  During his initial admission he was thought to have acute pyelonephritis but urine culture was no growth and blood cultures were no growth.  He clinically responded to cefepime and received a 10 day course of antibiotic coverage and was discharged off of antibiotics after clinical improvement.  His renal function had improved significantly at the time of the discharge.  He subsequently presented back to [facility] late last month with similar symptoms and his severe leukocytosis.  C. Difficile test and GI PCR were negative.  CT abdomen and pelvis showed improving stranding around his kidneys consistent with improving pyelonephritis.  Urine culture was negative and blood cultures were again negative. Karius test was negative (Although was antibiotic modified). TTE did not show vegetation.  He clinically improved on cefepime. He was subsequently discharged on Levaquin with plans for a one-week supply and was supposed to follow-up in my clinic today with repeat blood work.     The patient presented back to the hospital last night feeling extremely weak with a near syncopal episode, chills, back pain, nausea and vomiting, and one episode of diarrhea. The patient was found to be hypotensive and has been admitted to the ICU  and is on pressors. Lactic acid was elevated at 3.8 on arrival.  Pro-calcitonin was 1.89.  White blood cell count was elevated back to 33.46.  Creatinine was 2.8, up from 2.03 when he was discharged on 4/29. Respiratory PCR panel was negative.  Cortisol level was 26.23.  Urine Legionella and urine strep pneumo antigens were negative.  MRSA PCR nares is negative.  Blood cultures are in progress. CT chest shows stable small loculated right-sided pleural effusion with stable postoperative and chronic changes in both lungs.  CT abdomen and pelvis showed markedly bilateral perinephric stranding and fluid without hydronephrosis.  Appearance is nonspecific per radiology but could be related to pyelonephritis. The patient was initially given vancomycin and Zosyn in the ER and then has been switched to cefepime 2 g IV every 24 hours.  He remains in the ICU.  ID has been consultative for recommendations regarding the patient's recurrent sepsis and pyelonephritis.    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    Coronary artery disease 2019    Diabetes mellitus Borderline    Emphysema/COPD     Erectile disorder     GERD (gastroesophageal reflux disease)     History of chemotherapy     Hyperlipidemia     Hypertension 2019    Lung nodule     Mumps     Mumps     Pruritus     after bath    Slow to wake up after anesthesia     Wears dentures     upper only    Wears hearing aid in both ears     usually only wears right       Past Surgical History:   Procedure Laterality Date    BONE BIOPSY      broken bone surgery in his face    BRONCHOSCOPY THORACOTOMY Right 01/09/2024    Procedure: THORACOTOMY FOR LOWER LOBECTOMY AND MEDISTINAL LYMPH NODE DISSECTION RIGHT;  Surgeon: Joey Patel MD;  Location: [facility];  Service: Cardiothoracic;  Laterality: Right;    BRONCHOSCOPY WITH ION ROBOTIC ASSIST N/A 09/15/2023     "Procedure: BRONCHOSCOPY NAVIGATION WITH ENDOBRONCHIAL ULTRASOUND AND ION ROBOT;  Surgeon: Octvaiano Sampson MD;  Location:  [facility];  Service: Robotics - Pulmonary;  Laterality: N/A;  ion #[id number] - 0032  - 0015  Cath guide 0061    EBUS balloon removed and intact    CARDIAC ELECTROPHYSIOLOGY PROCEDURE N/A 08/17/2021    Procedure: Pacemaker DC new;  Surgeon: Kayy Box MD;  Location:  [facility];  Service: Cardiology;  Laterality: N/A;    FACIAL FRACTURE SURGERY      LYMPH NODE BIOPSY  2023    PACEMAKER IMPLANTATION         Pediatric History   Patient Parents    Not on file     Other Topics Concern    Not on file   Social History Narrative    Lives in [place], [place]       family history includes Aneurysm in his mother; Cancer in his sister; Dementia in his father; Heart disease in his paternal grandmother; Hypertension in his paternal grandfather; Leukemia in his sister.    Allergies   Allergen Reactions    Cymbalta [Duloxetine Hcl] GI Intolerance    Gabapentin Mental Status Change     Pt states that this medication \"makes him feel foolish in his head\".     Remeron [Mirtazapine] Other (See Comments)     Excess sedation    Toradol [Ketorolac Tromethamine] GI Intolerance     Projectile vomiting     Latex Other (See Comments)     Latex allergy     Tape Rash       Immunization History   Administered Date(s) Administered    ABRYSVO (RSV, 60+ or pregnant women 32-36 wks) 10/16/2023    COVID-19 (PFIZER) BIVALENT 12+YRS 09/16/2022    COVID-19 (PFIZER) Purple Cap Monovalent 01/29/2021, 02/19/2021, 10/18/2021, 04/14/2022    COVID-19 F23 (PFIZER) 12YRS+ (COMIRNATY) 09/26/2023    Fluzone High-Dose 65+yrs 10/06/2023    Pneumococcal Conjugate 20-Valent (PCV20) 10/11/2023       Medication:    Current Facility-Administered Medications:     acetaminophen (TYLENOL) tablet 650 mg, 650 mg, Oral, Q4H PRN **OR** acetaminophen (TYLENOL) suppository 650 mg, 650 mg, Rectal, Q4H PRN, Abhishek Alan, " ANAMIKA    albuterol (PROVENTIL) nebulizer solution 0.083% 2.5 mg/3mL, 2.5 mg, Nebulization, Q6H PRN, Abhishek Alan APRN    sennosides-docusate (PERICOLACE) 8.6-50 MG per tablet 2 tablet, 2 tablet, Oral, BID **AND** polyethylene glycol (MIRALAX) packet 17 g, 17 g, Oral, Daily PRN **AND** bisacodyl (DULCOLAX) EC tablet 5 mg, 5 mg, Oral, Daily PRN **AND** bisacodyl (DULCOLAX) suppository 10 mg, 10 mg, Rectal, Daily PRN, Abhishek Alan APRN    budesonide-formoterol (SYMBICORT) 160-4.5 MCG/ACT inhaler 2 puff, 2 puff, Inhalation, BID - RT **AND** tiotropium (SPIRIVA RESPIMAT) 2.5 mcg/act aerosol solution inhaler, 2 puff, Inhalation, Daily - RT, Abhishek Alan APRN    cefepime 2000 mg IVPB in 100 mL NS (MBP), 2,000 mg, Intravenous, Q24H, Abhishek Alan APRN, 2,000 mg at 05/06/24 0857    dextrose (D50W) (25 g/50 mL) IV injection 25 g, 25 g, Intravenous, Q15 Min PRN, Abhishek Alan APRN    dextrose (GLUTOSE) oral gel 15 g, 15 g, Oral, Q15 Min PRN, Abhishek Alan APRN    famotidine (PEPCID) tablet 20 mg, 20 mg, Oral, Daily, Abhishek Alan APRN, 20 mg at 05/06/24 0857    glucagon (GLUCAGEN) injection 1 mg, 1 mg, Intramuscular, Q15 Min PRN, Abhishek Alan APRN    heparin (porcine) 5000 UNIT/ML injection 5,000 Units, 5,000 Units, Subcutaneous, Q12H, Abhishek Alan APRN, 5,000 Units at 05/06/24 0857    HYDROcodone-acetaminophen (NORCO) 7.5-325 MG per tablet 1 tablet, 1 tablet, Oral, Q6H PRN, Abhishek Alan APRN    Insulin Lispro (humaLOG) injection 2-7 Units, 2-7 Units, Subcutaneous, 4x Daily AC & at Bedtime, Abhishek Alan APRN    lactated ringers infusion, 50 mL/hr, Intravenous, Continuous, Abhishek Alan APRN, Last Rate: 50 mL/hr at 05/06/24 0530, 50 mL/hr at 05/06/24 0530    midodrine (PROAMATINE) tablet 10 mg, 10 mg, Oral, Q8H, Abhishek Alan APRN, 10 mg at 05/06/24 0506    nitroglycerin (NITROSTAT) SL tablet 0.4 mg, 0.4 mg, Sublingual, Q5 Min PRN, Waqas,  ANAMIKA Nolasco    norepinephrine (LEVOPHED) 8 mg in 250 mL NS infusion (premix), 0.02-0.3 mcg/kg/min, Intravenous, Titrated, Rosalind Russell MD, Last Rate: 5.9 mL/hr at 05/06/24 0911, 0.04 mcg/kg/min at 05/06/24 0911    sodium chloride 0.9 % flush 10 mL, 10 mL, Intravenous, PRN, Rosalind Russell MD    sodium chloride 0.9 % flush 10 mL, 10 mL, Intravenous, Q12H, Abhishek Alan APRN, 10 mL at 05/06/24 0907    sodium chloride 0.9 % flush 10 mL, 10 mL, Intravenous, PRN, Abhishek Alan APRN    sodium chloride 0.9 % infusion 40 mL, 40 mL, Intravenous, PRN, Abhishek Alan APRN    Please refer to the medical record for a full medication list    Review of Systems:    Constitutional-- No Fever, chills or sweats.  Appetite poor.  + Fatigue  HEENT-- No new vision, hearing or throat complaints.  No epistaxis or oral sores.  Denies odynophagia or dysphagia.  No odynophagia or dysphagia. No headache, photophobia or neck stiffness.  CV-- No chest pain, palpitation or syncope  Resp-- No SOB/cough/Hemoptysis  GI- + Vague abdominal pain- improved.  + Nausea and vomiting- somewhat better.  + Diarrhea- now resolved.  No hematochezia, melena, or hematemesis. Denies jaundice or chronic liver disease.  -- No dysuria, hematuria, or flank pain.  Denies hesitancy, urgency or flank pain.  Lymph- no swollen lymph nodes in neck/axilla or groin.   Heme- No active bruising or bleeding;   MS-- no swelling or pain in the bones or joints of arms/legs.  No new back pain.  Neuro-- No acute focal weakness or numbness in the arms or legs.  No seizures.  Skin--No rashes or lesions    Physical Exam:   Vital Signs   Temp:  [97.6 °F (36.4 °C)-98.3 °F (36.8 °C)] 97.6 °F (36.4 °C)  Heart Rate:  [] 70  Resp:  [18-20] 18  BP: ()/(40-85) 109/64    Temp  Min: 97.6 °F (36.4 °C)  Max: 98.3 °F (36.8 °C)  BP  Min: 61/40  Max: 163/75  Pulse  Min: 70  Max: 113  Resp  Min: 18  Max: 20  SpO2  Min: 92 %  Max: 100 %    Blood pressure  "109/64, pulse 70, temperature 97.6 °F (36.4 °C), temperature source Axillary, resp. rate 18, height 182.9 cm (72\"), weight 78.7 kg (173 lb 9.6 oz), SpO2 100%.  GENERAL: Awake and alert, in no acute distress. Appears stated age.  Frail  HEENT:  Normocephalic, atraumatic.  Oropharynx without thrush. No external oral lesions.  Ears externally normal, Nose externally normal.  EYES: PERRL. No conjunctival injection. No icterus.   HEART: RRR, no murmur  LUNGS: Clear to auscultation and percussion. No respiratory distress, no use of accessory muscles.  ABDOMEN: Soft, nontender, nondistended. No appreciable HSM.  Bowel sounds normal.  GENITAL: no Yeung catheter  SKIN: no generalized rashes.  No peripheral stigmata of infective endocarditis noted.  PSYCHIATRIC: cooperative.  Appropriate mood and affect  EXT:  No cellulitic change.  NEURO: awake alert and oriented ×4.  Normal speech and cognition    AICD pocket site is without erythema or tenderness    Mediport site is without erythema     Results Review:   I reviewed the patient's new clinical results.  I reviewed the patient's new imaging results and agree with the interpretation.  I reviewed the patient's other test results and agree with the interpretation    Results from last 7 days   Lab Units 05/06/24  0035 05/05/24 2352 05/03/24  1632   WBC 10*3/mm3  --  33.46* 12.84*   HEMOGLOBIN g/dL  --  12.0* 11.1*   HEMOGLOBIN, POC g/dL 13.6  --   --    HEMATOCRIT %  --  38.9 34.7*   HEMATOCRIT POC % 40  --   --    PLATELETS 10*3/mm3  --  245 326     Results from last 7 days   Lab Units 05/06/24  0035 05/05/24  2352   SODIUM mmol/L  --  141   POTASSIUM mmol/L  --  4.1   CHLORIDE mmol/L  --  106   CO2 mmol/L  --  20.0*   BUN mg/dL  --  23   CREATININE mg/dL 3.30* 2.80*   GLUCOSE mg/dL  --  114*   CALCIUM mg/dL  --  8.7     Results from last 7 days   Lab Units 05/05/24  2352   ALK PHOS U/L 76   BILIRUBIN mg/dL 0.4   ALT (SGPT) U/L 22   AST (SGOT) U/L 30                 Results " "from last 7 days   Lab Units 05/06/24  0518   LACTATE mmol/L 2.1*     Estimated Creatinine Clearance: 21.5 mL/min (A) (by C-G formula based on SCr of 3.3 mg/dL (H)).  CPK          5/6/2024    02:07   Common Labs   Creatine Kinase 12       Procalitonin Results:      Lab 05/05/24  2352   PROCALCITONIN 1.89*      Brief Urine Lab Results  (Last result in the past 365 days)        Color   Clarity   Blood   Leuk Est   Nitrite   Protein   CREAT   Urine HCG        05/03/24 1632 Yellow   Clear   Negative   Negative   Negative   30 mg/dL (1+)                  No results found for: \"SITE\", \"ALLENTEST\", \"PHART\", \"EYK4KNP\", \"PO2ART\", \"TDB7RZE\", \"BASEEXCESS\", \"N8MDGUFJ\", \"HGBBG\", \"HCTABG\", \"OXYHEMOGLOBI\", \"METHHGBN\", \"CARBOXYHGB\", \"CO2CT\", \"BAROMETRIC\", \"MODALITY\", \"FIO2\"     Microbiology:  5/6 blood culture ×2: In progress    Radiology:  Imaging Results (Last 72 Hours)       Procedure Component Value Units Date/Time    CT Abdomen Pelvis Without Contrast [[id number]] Collected: 05/06/24 0050     Updated: 05/06/24 0057    Narrative:      CT ABDOMEN PELVIS WO CONTRAST    Date of Exam: 5/6/2024 12:29 AM EDT    Indication: diarrhea, vomiting, hypotension.    Comparison: None available.    Technique: Axial CT images were obtained of the abdomen and pelvis without the administration of contrast. Reconstructed coronal and sagittal images were also obtained. Automated exposure control and iterative construction methods were used.      Findings:  Findings in the chest discussed in a separate report. No acute findings in the superficial soft tissues. No acute osseous abnormalities or destructive bone lesions. There is mild thoracolumbar spondylosis. There are minor osteophytes of both hips.    The liver, gallbladder, bile ducts, pancreas, mid and distal stomach, duodenum and spleen appear unremarkable. Adrenal glands appear normal. There is marked bilateral perinephric stranding and fluid without organization. This appears confined to " Gerota's   fascia. Kidney parenchyma appears homogeneous. No hydronephrosis. No urolithiasis. Mild urinary bladder wall thickening appears unchanged. No pericystic inflammation. There is mild colonic diverticulosis and mild colonic fecal retention. No small bowel   distention. There is moderate aortoiliac atherosclerosis without evidence of aneurysm. No ascites, pneumoperitoneum or lymphadenopathy.      Impression:      Impression:  Marked bilateral perinephric stranding and fluid without hydronephrosis. Appearance is nonspecific, but could be related to pyelonephritis. Correlate with urinalysis.            Electronically Signed: Raulito Crenshaw MD    5/6/2024 12:54 AM EDT    Workstation ID: [id number]    CT Chest Without Contrast Diagnostic [[id number]] Collected: 05/06/24 0044     Updated: 05/06/24 0053    Narrative:      CT CHEST WO CONTRAST DIAGNOSTIC    Date of Exam: 5/6/2024 12:29 AM EDT    Indication: hypotension, chills, vomiting.    Comparison: Chest radiograph 5/5/2024 and chest CT 4/24/2024.    Technique: Axial CT images were obtained of the chest without contrast administration.  Reconstructed coronal and sagittal images were also obtained. Automated exposure control and iterative construction methods were used.      Findings:  There is a stable small loculated right-sided pleural effusion. There is stable scarring in the lung apices along with paraseptal emphysema. Stable subpleural interstitial disease in both lungs, right greater than left favored to be chronic. There is a   small calcified cluster of granulomas in the left upper lobe. There appear to be changes of right lower lobectomy. There is no pneumothorax. No left-sided pleural effusion. No new or enlarging lung nodules.    The thyroid, trachea and esophagus appear within normal limits. There is a small hiatal hernia. Heart size is normal. There are severe coronary artery calcifications. ICD leads noted in the right heart. Mild aortic  atherosclerosis. No aneurysm. No   mediastinal lymphadenopathy or pericardial effusion.    There is a stable left-sided chest port. Stable right-sided ICD. No acute findings in the superficial soft tissues. There is new marked bilateral perinephric stranding and trace unorganized fluid. The kidneys are only partially included on this study. No   additional findings in the limited images of the upper abdomen. No acute osseous abnormality or destructive bone lesion. There are moderate lower cervical and mild diffuse thoracic degenerative changes. There are healing minimally displaced fractures of   the right lateral fifth and sixth ribs, which appears not significantly changed from the prior study. No new rib fractures.      Impression:      Impression:    1. Stable small loculated right-sided pleural effusion with stable postoperative and chronic changes in both lungs.  2. Severe coronary artery disease.  3. Grossly abnormal appearance of partially visualized kidneys in the upper retroperitoneum. Appearance could be due to infection, inflammation or obstruction. Recommend dedicated imaging of the abdomen and pelvis ideally with IV contrast.  4. Small hiatal hernia.  5. Stable appearance of healing right lateral fifth and sixth rib fractures.          Electronically Signed: Raulito Crenshaw MD    5/6/2024 12:49 AM EDT    Workstation ID: [id number]    XR Chest 1 View [[id number]] Collected: 05/06/24 0020     Updated: 05/06/24 0024    Narrative:      XR CHEST 1 VW    Date of Exam: 5/5/2024 11:47 PM EDT    Indication: hypotension    Comparison: 4/24/2024 and chest CT same date.    Findings:  Stable volume loss in the right lung. Stable small right-sided pleural effusion and right basilar scarring. Left lung remains clear. Stable diffuse interstitial prominence in the lungs. No pneumothorax or new lung opacity. Stable right-sided multi lead   ICD. Heart size is unchanged. Pulmonary vasculature is within normal limits. No acute  osseous abnormalities. Stable left-sided chest port.      Impression:      Impression:  Stable chronic and postoperative changes and small right-sided pleural effusion.      Electronically Signed: Raulito Crenshaw MD    5/6/2024 12:21 AM EDT    Workstation ID: [id number]        I Independently read the patient's CT abdomen and pelvis from 5/5/24-Note the bilateral perinephric stranding and fluid.  No hydronephrosis    IMPRESSION:     Problems:  Recurrent sepsis, now presenting with septic shock with severe leukocytosis with neutrophilia, elevated procalcitonin level, lactic acidosis, hypotension with pressor requirement. Could be due to pyelonephritis that is noted on imaging but no positive culture data today.  Multiple urine cultures have been sent although may have been antibiotic modified.  Blood cultures have been no growth. Karius test from last admission was negative although was modified by antibiotics. He is seemingly responded well to cefepime.  He had recurrence on oral Levaquin.  May need to give him a longer course of empiric cefepime at time of discharge. Seems to be improving and now weaned on levophed ggt.  Bilateral pyelonephritis-present on imaging and some symptoms consistent with pyelonephritis but no culprit identified on culture.  No hydronephrosis or renal stone.  Unusual presentation in immunocompromised host.  Acute renal failure-present on his prior admissions.  I suspect this was due to hypotension.  Had improved during his last admission but now worse with a creatinine of 3.7 this admission likely due to recent hypotension.  Nausea and vomiting  Diarrhea  NSCLC/RLL lobectomy in January 2024, recently on Opdivo (last dose on 4/4/24)  Emphysema      RECOMMENDATIONS:    -Closely follow CBC and CMP  -Follow pending blood cultures  -Urinalysis with reflex to culture if indicated has been ordered but not collected yet.  Will be modified by antibiotics.  -Recommend urology consultation given his  unusual presentation and concern for persistent pyelonephritis  -recommend DAMIÁN this admission given the presence of his AICD and unclear etiology of recurrent sepsis  -ok to hold off on C diff testing if diarrhea has resolved. No BM today so far. I discussed with nursing  -Agree with cefepime 2 g IV every 24 hours. May need to give a longer course of IV antibiotic therapy  -Agree with ICU management.     I discussed in length with the patient and his daughter at bedside today    I discussed with nursing today    Thank you for asking me to see David Barfield.  Our group would be pleased to follow this patient over the course of their hospitalization and assist with outpatient antimicrobial therapy, as indicated.  Further recommendations depend on the results of the cultures and clinical course.    Complex MDM. 45 minutes off CC time spent on the patient's case today    Derian Barry MD  5/6/2024

## 2024-05-06 NOTE — H&P
Intensive Care Admission Note     Acute pyelonephritis    History of Present Illness     David Barfield is a 74 y.o. male with history of non-small cell lung ca s/p neoadjuvant chemoimmunotherapy and RLL lobectomy currently on Opdivo (last 4/4/24), HTN, emphysema, presence of port and pacemaker, current tobacco use, recent admission 4/12-4/22 & 4/24 - 4/29 for sepsis related to pyelonephritis who presented back with vague abd pain, near syncope, nausea/vomiting, tachycardia, weakness & diarrhea.  CT A/P shows bilateral perinephritic stranding c/w pyelonephritis.      He was discharged on LVQ after this last hospitalization under the auspices of [facility] (Dr. Barry).    Problem List, Surgical History, Family, Social History, and ROS     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    Coronary artery disease 2019    Diabetes mellitus Borderline    Emphysema/COPD     Erectile disorder     GERD (gastroesophageal reflux disease)     History of chemotherapy     Hyperlipidemia     Hypertension 2019    Lung nodule     Mumps     Mumps     Pruritus     after bath    Slow to wake up after anesthesia     Wears dentures     upper only    Wears hearing aid in both ears     usually only wears right      Past Surgical History:   Procedure Laterality Date    BONE BIOPSY      broken bone surgery in his face    BRONCHOSCOPY THORACOTOMY Right 01/09/2024    Procedure: THORACOTOMY FOR LOWER LOBECTOMY AND MEDISTINAL LYMPH NODE DISSECTION RIGHT;  Surgeon: Joey Patel MD;  Location: [facility] OR;  Service: Cardiothoracic;  Laterality: Right;    BRONCHOSCOPY WITH ION ROBOTIC ASSIST N/A 09/15/2023    Procedure: BRONCHOSCOPY NAVIGATION WITH ENDOBRONCHIAL ULTRASOUND AND ION ROBOT;  Surgeon: Octaviano Sampson MD;  Location: [facility] ENDOSCOPY;  Service: Robotics - Pulmonary;  Laterality: N/A;  ion #6 - 0032  - 0015  Cath  "guide 0061    EBUS balloon removed and intact    CARDIAC ELECTROPHYSIOLOGY PROCEDURE N/A 08/17/2021    Procedure: Pacemaker DC new;  Surgeon: Kayy Box MD;  Location: [facility] INVASIVE LOCATION;  Service: Cardiology;  Laterality: N/A;    FACIAL FRACTURE SURGERY      LYMPH NODE BIOPSY  2023    PACEMAKER IMPLANTATION         Allergies   Allergen Reactions    Cymbalta [Duloxetine Hcl] GI Intolerance    Gabapentin Mental Status Change     Pt states that this medication \"makes him feel foolish in his head\".     Remeron [Mirtazapine] Other (See Comments)     Excess sedation    Toradol [Ketorolac Tromethamine] GI Intolerance     Projectile vomiting     Latex Other (See Comments)     Latex allergy     Tape Rash     No current facility-administered medications on file prior to encounter.     Current Outpatient Medications on File Prior to Encounter   Medication Sig    albuterol sulfate  (90 Base) MCG/ACT inhaler Inhale 2 puffs Every 4 (Four) Hours As Needed for Wheezing.    amLODIPine (NORVASC) 5 MG tablet Take 1 tablet by mouth Daily for 30 days.    B Complex Vitamins (vitamin b complex) capsule capsule Take 1 capsule by mouth Daily. OTC    ferrous sulfate 325 (65 FE) MG tablet Take 1 tablet by mouth Daily With Breakfast. OTC    fluticasone (VERAMYST) 27.5 MCG/SPRAY nasal spray 2 sprays into the nostril(s) as directed by provider 1 (One) Time As Needed for Rhinitis or Allergies.    Fluticasone-Umeclidin-Vilant (Trelegy Ellipta) 100-62.5-25 MCG/ACT inhaler Inhale 1 puff Daily.    HYDROcodone-acetaminophen (Norco) 7.5-325 MG per tablet Take 1 tablet by mouth Every 6 (Six) Hours As Needed for Moderate Pain.    levoFLOXacin (Levaquin) 500 MG tablet Take 1 tablet by mouth Every Other Day for 8 days.    levoFLOXacin (Levaquin) 750 MG tablet Take 1 tablet by mouth Daily.    lidocaine-prilocaine (EMLA) 2.5-2.5 % cream Apply 1 application  topically to the appropriate area as directed As Needed (45-60 minutes " "prior to port access.  Cover with saran/plastic wrap.).    omeprazole (priLOSEC) 40 MG capsule Take 1 capsule by mouth Daily.    ondansetron (ZOFRAN) 8 MG tablet Take 1 tablet by mouth 3 (Three) Times a Day As Needed for Nausea or Vomiting.    pravastatin (PRAVACHOL) 80 MG tablet Take 1 tablet by mouth Every Night.    sildenafil (VIAGRA) 100 MG tablet Take 0.5 tablets by mouth Daily As Needed for Erectile Dysfunction.     MEDICATION LIST AND ALLERGIES REVIEWED.    Family History   Problem Relation Age of Onset    Aneurysm Mother         brain    Dementia Father     Leukemia Sister     Heart disease Paternal Grandmother     Hypertension Paternal Grandfather     Cancer Sister      Social History     Tobacco Use    Smoking status: Every Day     Current packs/day: 0.50     Average packs/day: 0.5 packs/day for 56.3 years (28.7 ttl pk-yrs)     Types: Cigarettes     Start date: 1/1/1968    Smokeless tobacco: Never    Tobacco comments:     Still smoke   Vaping Use    Vaping status: Never Used   Substance Use Topics    Alcohol use: Never    Drug use: Never     Social History     Social History Narrative    Lives in [place], [place]     FAMILY AND SOCIAL HISTORY REVIEWED.    Review of Systems   Respiratory: Negative.     Cardiovascular: Negative.    Gastrointestinal:  Positive for diarrhea, nausea and vomiting.     ALL OTHER SYSTEMS REVIEWED AND ARE NEGATIVE.    Physical Exam and Clinical Information   /66   Pulse 70   Temp 98 °F (36.7 °C) (Oral)   Resp 18   Ht 182.9 cm (72\")   Wt 78.7 kg (173 lb 9.6 oz)   SpO2 99%   BMI 23.54 kg/m²   Physical Exam  General Appearance: Awake, alert, in no acute distress   Lungs:   B/L Breath sounds present with decreased breath sounds on bases, no wheezing heard, no crackles.   Heart: S1 and S2 present, no murmur  Abdomen: Soft, nontender, no guarding or rigidity, bowel sounds positive, no masses. No renal angle tenderness.   Extremities: no edema, warm to touch.  Neurologic:  " Moving all four extremities. Good strength bilaterally. No focal deficit.   Psychological: Normal affect, Cooperative    Results from last 7 days   Lab Units 05/06/24  0035 05/05/24 2352 05/03/24  1632   WBC 10*3/mm3  --  33.46* 12.84*   HEMOGLOBIN g/dL  --  12.0* 11.1*   HEMOGLOBIN, POC g/dL 13.6  --   --    PLATELETS 10*3/mm3  --  245 326     Results from last 7 days   Lab Units 05/06/24  0035 05/05/24  2352 05/03/24  1632   SODIUM mmol/L  --  141 138   POTASSIUM mmol/L  --  4.1 3.9   CO2 mmol/L  --  20.0* 21.7*   BUN mg/dL  --  23 18   CREATININE mg/dL 3.30* 2.80* 2.22*   MAGNESIUM mg/dL  --  1.4* 2.0   GLUCOSE mg/dL  --  114* 124*     Estimated Creatinine Clearance: 21.5 mL/min (A) (by C-G formula based on SCr of 3.3 mg/dL (H)).  Results from last 7 days   Lab Units 05/03/24  1632   HEMOGLOBIN A1C % 6.30*         Lab Results   Component Value Date    LACTATE 2.1 (C) 05/06/2024        Images:     I reviewed the patient's results and images.     Ct abdomen: reviewed.  Impression:     1. Stable small loculated right-sided pleural effusion with stable postoperative and chronic changes in both lungs.   2. Severe coronary artery disease.   3. Grossly abnormal appearance of partially visualized kidneys in the upper retroperitoneum. Appearance could be due to infection, inflammation or obstruction. Recommend dedicated imaging of the abdomen and pelvis ideally with IV contrast.   4. Small hiatal hernia.   5. Stable appearance of healing right lateral fifth and sixth rib fractures.         Impression       Acute pyelonephritis    Mixed hyperlipidemia    Centrilobular emphysema    Tobacco abuse    Malignant neoplasm of lower lobe of right lung    Primary hypertension    GERD without esophagitis    Septic shock    ARACELI (acute kidney injury)    CAD (coronary artery disease)      Plan/Recommendations     74-year-old male with history of non-small cell lung cancer s/p neoadjuvant chemoradiation and right lower lobe lobectomy  and currently on immunotherapy, hypertension, emphysema with ongoing tobacco abuse presenting with complaints of chills back pain, near syncopal episode with nausea and vomiting and 1 episode of diarrhea.  Patient was feeling extremely weak.  States that every time he comes in the hospital he has similar symptoms.  Previously has been treated for UTI and pyelonephritis.  ID has followed him through the hospital stay and extensive workup was done including Karius testing which was negative.  Patient was discharged home on Levaquin.  Cultures all remain negative. This time patient was found to have significant perinephric stranding bilaterally on the abdominal CT.  CT chest showed small loculated right pleural effusion which was present before send surgery.  Patient denies any ongoing cough or chest pain symptoms.  No hemoptysis.  Patient was resuscitated with fluids and received full IV fluid bolus.  Patient is started on norepinephrine and currently at 0.1 mics.  States that he is already feeling better.  Denies any further nausea or diarrhea episodes.  No significant pain.    -Admit to intensive care unit for  closer hemodynamic monitoring and pressor management.  -Patient seems to have CT evidence of pyelonephritis.  Will get urinalysis.  Blood cultures have been obtained.change zosyn to cefepime (2 G Q 24 for GFR < 30), patient received 1.5 G of vanco in ED, defer further dosing to ID.  Unclear etiology of recurrent pyelonephritis.  Does not fit with immune pyelonephritis is patient has not received another dose of immunotherapy since last admission.  - Reconsult ID  -Worsened renal indicis suggesting of acute on chronic renal injury.  Continue fluid resuscitation.  Follow urine output and electrolytes closely.  Repeat BMP later on today.  Check CPK.  If further worsening of creatinine oliguria we will get nephrology consultation again.  - Check C. difficile.  Patient was recently treated with Levaquin and will  be high risk of enteritis.  Check a GI PCR.  No clinical evidence of colitis.    - change home PPI to pepcid awaiting c diffficle w/u  - resume midodrine, check cortisol to rule out adrenal insufficiency  - Wondering if we need to have Urology consult for recurrent pyelonephritis?  Do I do not see any obvious hydronephrosis.  - Continue inhaler therapy for underlying COPD.  - DVT prophylaxis with subcu heparin.    Family updated at bedside.     Time spent Critical care 35 min (exclusive of procedure time)  including high complexity decision making to assess, manipulate, and support vital organ system failure in this individual who has impairment of one or more vital organ systems such that there is a high probability of imminent or life threatening deterioration in the patient’s condition.      Thanh Summers MD, [facility]  Pulmonary and Critical Care Medicine  05/06/24 06:16 EDT     CC: Shahid Drake MD    4 minutes of critical care provided by ANAMIKA Aguilar in addendum accordance with split/shared billing. This time excludes other billable procedures. Time does include preparation of documents, medical consultations, review of old records, and direct bedside care. Patient is at high risk for life-threatening deterioration due to sepsis.   Electronically signed by ANAMIKA Montes, 05/06/24, 3:09 AM EDT.

## 2024-05-07 ENCOUNTER — TELEPHONE (OUTPATIENT)
Dept: ONCOLOGY | Facility: CLINIC | Age: 74
End: 2024-05-07

## 2024-05-07 LAB
ADV 40+41 DNA STL QL NAA+NON-PROBE: NOT DETECTED
ALBUMIN SERPL-MCNC: 2.7 G/DL (ref 3.5–5.2)
ALBUMIN/GLOB SERPL: 0.8 G/DL
ALP SERPL-CCNC: 72 U/L (ref 39–117)
ALT SERPL W P-5'-P-CCNC: 13 U/L (ref 1–41)
ANION GAP SERPL CALCULATED.3IONS-SCNC: 15 MMOL/L (ref 5–15)
AST SERPL-CCNC: 18 U/L (ref 1–40)
ASTRO TYP 1-8 RNA STL QL NAA+NON-PROBE: NOT DETECTED
BACTERIA SPEC AEROBE CULT: NO GROWTH
BASOPHILS # BLD AUTO: 0.12 10*3/MM3 (ref 0–0.2)
BASOPHILS NFR BLD AUTO: 0.4 % (ref 0–1.5)
BILIRUB SERPL-MCNC: 0.3 MG/DL (ref 0–1.2)
BUN SERPL-MCNC: 36 MG/DL (ref 8–23)
BUN/CREAT SERPL: 7 (ref 7–25)
C CAYETANENSIS DNA STL QL NAA+NON-PROBE: NOT DETECTED
C COLI+JEJ+UPSA DNA STL QL NAA+NON-PROBE: NOT DETECTED
CALCIUM SPEC-SCNC: 8.2 MG/DL (ref 8.6–10.5)
CHLORIDE SERPL-SCNC: 99 MMOL/L (ref 98–107)
CO2 SERPL-SCNC: 17 MMOL/L (ref 22–29)
CREAT SERPL-MCNC: 5.11 MG/DL (ref 0.76–1.27)
CREAT UR-MCNC: 60 MG/DL
CRYPTOSP DNA STL QL NAA+NON-PROBE: NOT DETECTED
DEPRECATED RDW RBC AUTO: 55.1 FL (ref 37–54)
E HISTOLYT DNA STL QL NAA+NON-PROBE: NOT DETECTED
EAEC PAA PLAS AGGR+AATA ST NAA+NON-PRB: NOT DETECTED
EC STX1+STX2 GENES STL QL NAA+NON-PROBE: NOT DETECTED
EGFRCR SERPLBLD CKD-EPI 2021: 11.1 ML/MIN/1.73
EOSINOPHIL # BLD AUTO: 0.25 10*3/MM3 (ref 0–0.4)
EOSINOPHIL NFR BLD AUTO: 0.9 % (ref 0.3–6.2)
EPEC EAE GENE STL QL NAA+NON-PROBE: NOT DETECTED
ERYTHROCYTE [DISTWIDTH] IN BLOOD BY AUTOMATED COUNT: 16.4 % (ref 12.3–15.4)
ETEC LTA+ST1A+ST1B TOX ST NAA+NON-PROBE: NOT DETECTED
G LAMBLIA DNA STL QL NAA+NON-PROBE: NOT DETECTED
GLOBULIN UR ELPH-MCNC: 3.4 GM/DL
GLUCOSE BLDC GLUCOMTR-MCNC: 102 MG/DL (ref 70–130)
GLUCOSE BLDC GLUCOMTR-MCNC: 104 MG/DL (ref 70–130)
GLUCOSE BLDC GLUCOMTR-MCNC: 120 MG/DL (ref 70–130)
GLUCOSE BLDC GLUCOMTR-MCNC: 86 MG/DL (ref 70–130)
GLUCOSE BLDC GLUCOMTR-MCNC: 99 MG/DL (ref 70–130)
GLUCOSE SERPL-MCNC: 93 MG/DL (ref 65–99)
HCT VFR BLD AUTO: 29 % (ref 37.5–51)
HGB BLD-MCNC: 9.4 G/DL (ref 13–17.7)
IMM GRANULOCYTES # BLD AUTO: 0.22 10*3/MM3 (ref 0–0.05)
IMM GRANULOCYTES NFR BLD AUTO: 0.8 % (ref 0–0.5)
LYMPHOCYTES # BLD AUTO: 0.96 10*3/MM3 (ref 0.7–3.1)
LYMPHOCYTES NFR BLD AUTO: 3.3 % (ref 19.6–45.3)
MAGNESIUM SERPL-MCNC: 2.3 MG/DL (ref 1.6–2.4)
MCH RBC QN AUTO: 29.7 PG (ref 26.6–33)
MCHC RBC AUTO-ENTMCNC: 32.4 G/DL (ref 31.5–35.7)
MCV RBC AUTO: 91.5 FL (ref 79–97)
MONOCYTES # BLD AUTO: 1.26 10*3/MM3 (ref 0.1–0.9)
MONOCYTES NFR BLD AUTO: 4.3 % (ref 5–12)
NEUTROPHILS NFR BLD AUTO: 26.18 10*3/MM3 (ref 1.7–7)
NEUTROPHILS NFR BLD AUTO: 90.3 % (ref 42.7–76)
NOROVIRUS GI+II RNA STL QL NAA+NON-PROBE: NOT DETECTED
NRBC BLD AUTO-RTO: 0 /100 WBC (ref 0–0.2)
P SHIGELLOIDES DNA STL QL NAA+NON-PROBE: NOT DETECTED
PHOSPHATE SERPL-MCNC: 3.1 MG/DL (ref 2.5–4.5)
PLATELET # BLD AUTO: 175 10*3/MM3 (ref 140–450)
PMV BLD AUTO: 9.7 FL (ref 6–12)
POTASSIUM SERPL-SCNC: 4.5 MMOL/L (ref 3.5–5.2)
PROT SERPL-MCNC: 6.1 G/DL (ref 6–8.5)
RBC # BLD AUTO: 3.17 10*6/MM3 (ref 4.14–5.8)
RVA RNA STL QL NAA+NON-PROBE: NOT DETECTED
S ENT+BONG DNA STL QL NAA+NON-PROBE: NOT DETECTED
SAPO I+II+IV+V RNA STL QL NAA+NON-PROBE: NOT DETECTED
SHIGELLA SP+EIEC IPAH ST NAA+NON-PROBE: NOT DETECTED
SODIUM SERPL-SCNC: 131 MMOL/L (ref 136–145)
UUN 24H UR-MCNC: 205 MG/DL
V CHOL+PARA+VUL DNA STL QL NAA+NON-PROBE: NOT DETECTED
V CHOLERAE DNA STL QL NAA+NON-PROBE: NOT DETECTED
WBC NRBC COR # BLD AUTO: 28.99 10*3/MM3 (ref 3.4–10.8)
Y ENTEROCOL DNA STL QL NAA+NON-PROBE: NOT DETECTED

## 2024-05-07 PROCEDURE — 87507 IADNA-DNA/RNA PROBE TQ 12-25: CPT | Performed by: EMERGENCY MEDICINE

## 2024-05-07 PROCEDURE — 25010000002 HEPARIN (PORCINE) PER 1000 UNITS: Performed by: INTERNAL MEDICINE

## 2024-05-07 PROCEDURE — 94799 UNLISTED PULMONARY SVC/PX: CPT

## 2024-05-07 PROCEDURE — 63710000001 ONDANSETRON ODT 4 MG TABLET DISPERSIBLE: Performed by: INTERNAL MEDICINE

## 2024-05-07 PROCEDURE — 82948 REAGENT STRIP/BLOOD GLUCOSE: CPT

## 2024-05-07 PROCEDURE — 93005 ELECTROCARDIOGRAM TRACING: CPT | Performed by: NURSE PRACTITIONER

## 2024-05-07 PROCEDURE — 84100 ASSAY OF PHOSPHORUS: CPT | Performed by: INTERNAL MEDICINE

## 2024-05-07 PROCEDURE — 83735 ASSAY OF MAGNESIUM: CPT | Performed by: INTERNAL MEDICINE

## 2024-05-07 PROCEDURE — 93010 ELECTROCARDIOGRAM REPORT: CPT | Performed by: INTERNAL MEDICINE

## 2024-05-07 PROCEDURE — 25010000002 HEPARIN (PORCINE) PER 1000 UNITS: Performed by: NURSE PRACTITIONER

## 2024-05-07 PROCEDURE — 99223 1ST HOSP IP/OBS HIGH 75: CPT | Performed by: INTERNAL MEDICINE

## 2024-05-07 PROCEDURE — 85025 COMPLETE CBC W/AUTO DIFF WBC: CPT | Performed by: INTERNAL MEDICINE

## 2024-05-07 PROCEDURE — 80053 COMPREHEN METABOLIC PANEL: CPT | Performed by: INTERNAL MEDICINE

## 2024-05-07 PROCEDURE — 99233 SBSQ HOSP IP/OBS HIGH 50: CPT | Performed by: INTERNAL MEDICINE

## 2024-05-07 PROCEDURE — 25010000002 CEFEPIME PER 500 MG: Performed by: NURSE PRACTITIONER

## 2024-05-07 RX ORDER — MIDODRINE HYDROCHLORIDE 5 MG/1
5 TABLET ORAL EVERY 8 HOURS
Status: DISCONTINUED | OUTPATIENT
Start: 2024-05-07 | End: 2024-05-08

## 2024-05-07 RX ORDER — ONDANSETRON 4 MG/1
4 TABLET, ORALLY DISINTEGRATING ORAL ONCE
Status: COMPLETED | OUTPATIENT
Start: 2024-05-07 | End: 2024-05-07

## 2024-05-07 RX ADMIN — TIOTROPIUM BROMIDE INHALATION SPRAY 2 PUFF: 3.12 SPRAY, METERED RESPIRATORY (INHALATION) at 07:42

## 2024-05-07 RX ADMIN — ONDANSETRON 4 MG: 4 TABLET, ORALLY DISINTEGRATING ORAL at 11:38

## 2024-05-07 RX ADMIN — SENNOSIDES AND DOCUSATE SODIUM 2 TABLET: 8.6; 5 TABLET ORAL at 08:40

## 2024-05-07 RX ADMIN — MIDODRINE HYDROCHLORIDE 10 MG: 10 TABLET ORAL at 05:03

## 2024-05-07 RX ADMIN — ACETAMINOPHEN 650 MG: 325 TABLET ORAL at 11:38

## 2024-05-07 RX ADMIN — HEPARIN SODIUM 5000 UNITS: 5000 INJECTION INTRAVENOUS; SUBCUTANEOUS at 08:39

## 2024-05-07 RX ADMIN — BUDESONIDE AND FORMOTEROL FUMARATE DIHYDRATE 2 PUFF: 160; 4.5 AEROSOL RESPIRATORY (INHALATION) at 07:42

## 2024-05-07 RX ADMIN — CEFEPIME 2000 MG: 2 INJECTION, POWDER, FOR SOLUTION INTRAVENOUS at 08:40

## 2024-05-07 RX ADMIN — HEPARIN SODIUM 5000 UNITS: 5000 INJECTION INTRAVENOUS; SUBCUTANEOUS at 21:00

## 2024-05-07 RX ADMIN — Medication 10 ML: at 21:03

## 2024-05-07 RX ADMIN — FAMOTIDINE 20 MG: 20 TABLET, FILM COATED ORAL at 08:40

## 2024-05-07 NOTE — PROGRESS NOTES
Malnutrition Severity Assessment    Patient Name:  David Barfield  YOB: 1950  MRN: [id number]  Admit Date:  5/5/2024    Patient meets criteria for : Severe Malnutrition (Pt meets criteria for severe chronic malnutrition based on wt intake hx w wasting.)    Comments:      Malnutrition Severity Assessment  Malnutrition Type: Chronic Disease - Related Malnutrition  Malnutrition Type (Last 8 Hours)       Malnutrition Severity Assessment       Row Name 05/06/24 2141       Malnutrition Severity Assessment    Malnutrition Type Chronic Disease - Related Malnutrition      Row Name 05/06/24 2141       Insufficient Energy Intake     Insufficient Energy Intake Findings Severe    Insufficient Energy Intake  <75% of est. energy requirement for > or equal to 1 month      Row Name 05/06/24 2141       Unintentional Weight Loss     Unintentional Weight Loss Findings Severe    Unintentional Weight Loss  Weight loss greater than 7.5% in three months      Row Name 05/06/24 2141       Muscle Loss    Loss of Muscle Mass Findings Mild    Scientology Region --  mild    Clavicle Bone Region --  mild    Acromion Bone Region --  mild    Scapular Bone Region --  mild    Dorsal Hand Region None    Patellar Region None    Anterior Thigh Region None    Posterior Calf Region Moderate - some roundness, slight firmness      Row Name 05/06/24 2141       Fat Loss    Subcutaneous Fat Loss Findings Mild    Orbital Region  --  mild    Upper Arm Region None    Thoracic & Lumbar Region --  mild      Row Name 05/06/24 2141       Criteria Met (Must meet criteria for severity in at least 2 of these categories: M Wasting, Fat Loss, Fluid, Secondary Signs, Wt. Status, Intake)    Patient meets criteria for  Severe Malnutrition  Pt meets criteria for severe chronic malnutrition based on wt intake hx w wasting.                    Electronically signed by:  Ellie Dominguez RD  05/06/24 21:54 EDT

## 2024-05-07 NOTE — CONSULTS
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  He had residual disease involving the right lower lobe with 60% residual viable tumor and positive treatment effect.  Margins were negative.  Lymph nodes negative.  He initiated immunotherapy maintenance in February 2024.  He received his most recent dose Opdivo 4/4/2024.  He had a PET/CT in early April showing very low level activity in the paratracheal and supraclavicular lymph node that is very close to background mediastinal/hepatic uptake. Compared to his prior malignancy, the degree of FDG uptake is markedly lower and serial observation planned.      He was then admitted from 4/12/2024 through 4/22/2024 with severe sepsis which was attributed to bilateral pyelonephritis in the context of abnormal CT scan findings.  His cultures were negative.  However clinically he improved on antibiotics and he received a 10-day course of cefepime.    Most recently he was admitted from 4/24/2024 through 4/29/2024 with recurrent symptoms which again resolved with cefepime.  He was discharged on oral Levaquin.  He had normalization of his white blood cell count and improvement in his kidney function with IV antibiotics.  Unfortunately he represented with sepsis on 5/5/2024.  He also has recurrent ARACELI with creatinine increased to 5.  His blood counts had normalized on hospital discharge and were 33 on presentation, again downtrending with IV antibiotics.      Allergies   Allergen  "Reactions    Cymbalta [Duloxetine Hcl] GI Intolerance    Gabapentin Mental Status Change     Pt states that this medication \"makes him feel foolish in his head\".     Remeron [Mirtazapine] Other (See Comments)     Excess sedation    Toradol [Ketorolac Tromethamine] GI Intolerance     Projectile vomiting     Latex Other (See Comments)     Latex allergy     Tape Rash       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    Coronary artery disease 2019    Diabetes mellitus Borderline    Emphysema/COPD     Erectile disorder     GERD (gastroesophageal reflux disease)     History of chemotherapy     Hyperlipidemia     Hypertension 2019    Lung nodule     Mumps     Mumps     Pruritus     after bath    Slow to wake up after anesthesia     Wears dentures     upper only    Wears hearing aid in both ears     usually only wears right         Current Facility-Administered Medications:     acetaminophen (TYLENOL) tablet 650 mg, 650 mg, Oral, Q4H PRN, 650 mg at 05/07/24 1138 **OR** acetaminophen (TYLENOL) suppository 650 mg, 650 mg, Rectal, Q4H PRN, Abhishek Alan APRN    albuterol (PROVENTIL) nebulizer solution 0.083% 2.5 mg/3mL, 2.5 mg, Nebulization, Q6H PRN, Abhishek Alan APRN    sennosides-docusate (PERICOLACE) 8.6-50 MG per tablet 2 tablet, 2 tablet, Oral, BID, 2 tablet at 05/07/24 0840 **AND** polyethylene glycol (MIRALAX) packet 17 g, 17 g, Oral, Daily PRN **AND** bisacodyl (DULCOLAX) EC tablet 5 mg, 5 mg, Oral, Daily PRN **AND** bisacodyl (DULCOLAX) suppository 10 mg, 10 mg, Rectal, Daily PRN, Abhishek Alan APRN    budesonide-formoterol (SYMBICORT) 160-4.5 MCG/ACT inhaler 2 puff, 2 puff, Inhalation, BID - RT, 2 puff at 05/07/24 0742 **AND** tiotropium (SPIRIVA RESPIMAT) 2.5 mcg/act aerosol solution inhaler, 2 puff, Inhalation, Daily - RT, Abhishek Alan APRN, 2 puff at 05/07/24 0742    cefepime " 2000 mg IVPB in 100 mL NS (MBP), 2,000 mg, Intravenous, Q24H, Abhishek Alan APRN, 2,000 mg at 05/07/24 0840    dextrose (D50W) (25 g/50 mL) IV injection 25 g, 25 g, Intravenous, Q15 Min PRN, Abhishek Alan APRN    dextrose (GLUTOSE) oral gel 15 g, 15 g, Oral, Q15 Min PRN, Abhishek Alan APRN    famotidine (PEPCID) tablet 20 mg, 20 mg, Oral, Daily, Abhishek Alan APRN, 20 mg at 05/07/24 0840    glucagon (GLUCAGEN) injection 1 mg, 1 mg, Intramuscular, Q15 Min PRN, Abhishek Alan APRN    heparin (porcine) 5000 UNIT/ML injection 5,000 Units, 5,000 Units, Subcutaneous, Q12H, Abhishek Alan APRN, 5,000 Units at 05/07/24 0839    HYDROcodone-acetaminophen (NORCO) 7.5-325 MG per tablet 1 tablet, 1 tablet, Oral, Q6H PRN, Abhishek Alan APRN    Insulin Lispro (humaLOG) injection 2-7 Units, 2-7 Units, Subcutaneous, 4x Daily AC & at Bedtime, Abhishek Alan APRN    midodrine (PROAMATINE) tablet 5 mg, 5 mg, Oral, Q8H, Kam Gomez MD    nitroglycerin (NITROSTAT) SL tablet 0.4 mg, 0.4 mg, Sublingual, Q5 Min PRN, Abhishek Alan APRN    sodium chloride 0.9 % flush 10 mL, 10 mL, Intravenous, PRN, Rosalind Russell MD    sodium chloride 0.9 % flush 10 mL, 10 mL, Intravenous, Q12H, Abhishek Alan APRN, 10 mL at 05/06/24 2012    sodium chloride 0.9 % flush 10 mL, 10 mL, Intravenous, PRN, Abhishek Alan APRN    sodium chloride 0.9 % infusion 40 mL, 40 mL, Intravenous, PRN, Waqas, Abhishek J, APRN    Past Surgical History:   Procedure Laterality Date    BONE BIOPSY      broken bone surgery in his face    BRONCHOSCOPY THORACOTOMY Right 01/09/2024    Procedure: THORACOTOMY FOR LOWER LOBECTOMY AND MEDISTINAL LYMPH NODE DISSECTION RIGHT;  Surgeon: Joey Patel MD;  Location: [facility];  Service: Cardiothoracic;  Laterality: Right;    BRONCHOSCOPY WITH ION ROBOTIC ASSIST N/A 09/15/2023    Procedure: BRONCHOSCOPY NAVIGATION WITH ENDOBRONCHIAL ULTRASOUND AND ION ROBOT;   Surgeon: Octaviano Sampson MD;  Location:  [facility];  Service: Robotics - Pulmonary;  Laterality: N/A;  ion #6 - 0032  - 0015  Cath guide 0061    EBUS balloon removed and intact    CARDIAC ELECTROPHYSIOLOGY PROCEDURE N/A 08/17/2021    Procedure: Pacemaker DC new;  Surgeon: Kayy Box MD;  Location:  [facility];  Service: Cardiology;  Laterality: N/A;    FACIAL FRACTURE SURGERY      LYMPH NODE BIOPSY  2023    PACEMAKER IMPLANTATION         Social History     Socioeconomic History    Marital status: Significant Other    Number of children: 3   Tobacco Use    Smoking status: Every Day     Current packs/day: 0.50     Average packs/day: 0.5 packs/day for 56.3 years (28.7 ttl pk-yrs)     Types: Cigarettes     Start date: 1/1/1968    Smokeless tobacco: Never    Tobacco comments:     Still smoke   Vaping Use    Vaping status: Never Used   Substance and Sexual Activity    Alcohol use: Never    Drug use: Never    Sexual activity: Yes     Partners: Female     Birth control/protection: None       Family History   Problem Relation Age of Onset    Aneurysm Mother         brain    Dementia Father     Leukemia Sister     Heart disease Paternal Grandmother     Hypertension Paternal Grandfather     Cancer Sister        Oncology/Hematology History   Malignant neoplasm of lower lobe of right lung   10/4/2023 Initial Diagnosis    Malignant neoplasm of lower lobe of right lung     10/4/2023 Cancer Staged    Staging form: Lung, AJCC 8th Edition  - Clinical: Stage IIIA (cT2b, cN2, cM0) - Signed by Neetu Ashley MD on 10/4/2023     10/23/2023 - 12/5/2023 Chemotherapy    OP LUNG  Nivolumab 360mg /  PACLitaxel / CARBOplatin AUC=5      10/23/2023 -  Chemotherapy    OP CENTRAL VENOUS ACCESS DEVICE Access, Care, and Maintenance (CVAD)     2/6/2024 - 2/27/2024 Chemotherapy    OP LUNG Atezolizumab     4/4/2024 Biopsy    OP LUNG Nivolumab 480 mg  Plan Provider: Neetu Ashley MD  Treatment goal:  Curative  Line of treatment: [No plan line of treatment]           Objective     Vitals:    05/07/24 1100 05/07/24 1200 05/07/24 1500 05/07/24 1703   BP: 154/78 140/79 168/85 170/90   BP Location:  Right arm     Patient Position:  Lying     Pulse: 70 70 72 96   Resp:  18     Temp:  97.3 °F (36.3 °C)     TempSrc:  Oral     SpO2: 96% 97% 98% 100%   Weight:       Height:                       Temp:  [97.3 °F (36.3 °C)-98.4 °F (36.9 °C)] 97.3 °F (36.3 °C)     Performance Status: 2    Physical Exam    General: well appearing male in no acute distress  HEENT: sclerae anicteric, oropharynx clear  Lymphatics: no cervical, supraclavicular, or axillary adenopathy  Cardiovascular: regular rate and rhythm, no murmurs  Lungs: clear to auscultation bilaterally  Abdomen: soft, nontender, nondistended.  No palpable organomegaly  Extremities: no lower extremity edema  Skin: no rashes, lesions, bruising, or petechiae      LABS:    Lab Results   Component Value Date    HGB 9.4 (L) 05/07/2024    HCT 29.0 (L) 05/07/2024    MCV 91.5 05/07/2024     05/07/2024    WBC 28.99 (H) 05/07/2024    NEUTROABS 26.18 (H) 05/07/2024    LYMPHSABS 0.96 05/07/2024    MONOSABS 1.26 (H) 05/07/2024    EOSABS 0.25 05/07/2024    BASOSABS 0.12 05/07/2024     Lab Results   Component Value Date    GLUCOSE 93 05/07/2024    BUN 36 (H) 05/07/2024    CREATININE 5.11 (H) 05/07/2024     (L) 05/07/2024    K 4.5 05/07/2024    CL 99 05/07/2024    CO2 17.0 (L) 05/07/2024    CALCIUM 8.2 (L) 05/07/2024    PROTEINTOT 6.1 05/07/2024    ALBUMIN 2.7 (L) 05/07/2024    BILITOT 0.3 05/07/2024    ALKPHOS 72 05/07/2024    AST 18 05/07/2024    ALT 13 05/07/2024         IMAGING    CT Abdomen Pelvis Without Contrast    Result Date: 5/6/2024  CT ABDOMEN PELVIS WO CONTRAST Date of Exam: 5/6/2024 12:29 AM EDT Indication: diarrhea, vomiting, hypotension. Comparison: None available. Technique: Axial CT images were obtained of the abdomen and pelvis without the administration of  contrast. Reconstructed coronal and sagittal images were also obtained. Automated exposure control and iterative construction methods were used. Findings: Findings in the chest discussed in a separate report. No acute findings in the superficial soft tissues. No acute osseous abnormalities or destructive bone lesions. There is mild thoracolumbar spondylosis. There are minor osteophytes of both hips. The liver, gallbladder, bile ducts, pancreas, mid and distal stomach, duodenum and spleen appear unremarkable. Adrenal glands appear normal. There is marked bilateral perinephric stranding and fluid without organization. This appears confined to Gerota's  fascia. Kidney parenchyma appears homogeneous. No hydronephrosis. No urolithiasis. Mild urinary bladder wall thickening appears unchanged. No pericystic inflammation. There is mild colonic diverticulosis and mild colonic fecal retention. No small bowel distention. There is moderate aortoiliac atherosclerosis without evidence of aneurysm. No ascites, pneumoperitoneum or lymphadenopathy.     Impression: Marked bilateral perinephric stranding and fluid without hydronephrosis. Appearance is nonspecific, but could be related to pyelonephritis. Correlate with urinalysis. Electronically Signed: Raulito Crenshaw MD  5/6/2024 12:54 AM EDT  Workstation ID: [id number]    CT Chest Without Contrast Diagnostic    Result Date: 5/6/2024  CT CHEST WO CONTRAST DIAGNOSTIC Date of Exam: 5/6/2024 12:29 AM EDT Indication: hypotension, chills, vomiting. Comparison: Chest radiograph 5/5/2024 and chest CT 4/24/2024. Technique: Axial CT images were obtained of the chest without contrast administration.  Reconstructed coronal and sagittal images were also obtained. Automated exposure control and iterative construction methods were used. Findings: There is a stable small loculated right-sided pleural effusion. There is stable scarring in the lung apices along with paraseptal emphysema. Stable  subpleural interstitial disease in both lungs, right greater than left favored to be chronic. There is a small calcified cluster of granulomas in the left upper lobe. There appear to be changes of right lower lobectomy. There is no pneumothorax. No left-sided pleural effusion. No new or enlarging lung nodules. The thyroid, trachea and esophagus appear within normal limits. There is a small hiatal hernia. Heart size is normal. There are severe coronary artery calcifications. ICD leads noted in the right heart. Mild aortic atherosclerosis. No aneurysm. No mediastinal lymphadenopathy or pericardial effusion. There is a stable left-sided chest port. Stable right-sided ICD. No acute findings in the superficial soft tissues. There is new marked bilateral perinephric stranding and trace unorganized fluid. The kidneys are only partially included on this study. No  additional findings in the limited images of the upper abdomen. No acute osseous abnormality or destructive bone lesion. There are moderate lower cervical and mild diffuse thoracic degenerative changes. There are healing minimally displaced fractures of  the right lateral fifth and sixth ribs, which appears not significantly changed from the prior study. No new rib fractures.     Impression: 1. Stable small loculated right-sided pleural effusion with stable postoperative and chronic changes in both lungs. 2. Severe coronary artery disease. 3. Grossly abnormal appearance of partially visualized kidneys in the upper retroperitoneum. Appearance could be due to infection, inflammation or obstruction. Recommend dedicated imaging of the abdomen and pelvis ideally with IV contrast. 4. Small hiatal hernia. 5. Stable appearance of healing right lateral fifth and sixth rib fractures. Electronically Signed: Raulito Crenshaw MD  5/6/2024 12:49 AM EDT  Workstation ID: [id number]    XR Chest 1 View    Result Date: 5/6/2024  XR CHEST 1 VW Date of Exam: 5/5/2024 11:47 PM EDT  Indication: hypotension Comparison: 4/24/2024 and chest CT same date. Findings: Stable volume loss in the right lung. Stable small right-sided pleural effusion and right basilar scarring. Left lung remains clear. Stable diffuse interstitial prominence in the lungs. No pneumothorax or new lung opacity. Stable right-sided multi lead ICD. Heart size is unchanged. Pulmonary vasculature is within normal limits. No acute osseous abnormalities. Stable left-sided chest port.     Impression: Stable chronic and postoperative changes and small right-sided pleural effusion. Electronically Signed: Raulito Crenshaw MD  5/6/2024 12:21 AM EDT  Workstation ID: [id number]    Adult Transthoracic Echo Complete W/ Cont if Necessary Per Protocol    Result Date: 4/27/2024    Left ventricular ejection fraction appears to be 61 - 65%.   Left ventricular wall thickness is consistent with mild concentric hypertroph   There is calcification and thickening of the aortic valve.  No independently mobile vegetations visualized.   Mild aortic valve regurgitation is present   Mild dilation of the aortic root is present.  Measures 4.0 cm.   There are pacemaker leads noted in the right atrium/right ventricle.   Mild tricuspid valve regurgitation is present. Estimated right ventricular systolic pressure from tricuspid regurgitation is normal (<35 mmHg).   Mild mitral valve regurgitation is present     CT Abdomen Pelvis Without Contrast    Result Date: 4/24/2024  CT ABDOMEN PELVIS WO CONTRAST Date of Exam: 4/24/2024 12:13 PM EDT Indication: Sepsis, source unclear. Comparison: 4/12/2024 Technique: Axial CT images were obtained of the abdomen and pelvis without the administration of contrast. Reconstructed coronal and sagittal images were also obtained. Automated exposure control and iterative construction methods were used. Findings: Included lung bases are unchanged in appearance from prior CT. No new abnormality. No suspicious hepatic lesion. Gallbladder  is unremarkable. No significant biliary ductal dilation. The spleen, pancreas, and bilateral adrenal glands are unchanged. There is significantly decreased perinephric fat stranding about the bilateral kidneys. There is no evidence of hydronephrosis or nephrolithiasis. No suspicious renal lesion. Small hiatal hernia. No bowel obstruction. Normal appendix. A few scattered colonic diverticuli without evidence of diverticulitis. No suspicious lymphadenopathy. Dense atherosclerotic calcifications of the aorta and branch vessels. Decreased urinary bladder wall thickening which may be in part secondary to increased distention. Prostatomegaly. Abdominal and pelvic wall soft tissues show no acute abnormality. There is no acute fracture or suspicious osseous lesion.     Impression: No new discrete abnormality of the abdomen or pelvis as compared to prior CT. Significantly decreased perinephric fat stranding about the bilateral kidneys. Decreased urinary bladder wall thickening which may be in part secondary to increased distention compared to prior exam. Electronically Signed: Raulito Light MD  4/24/2024 12:47 PM EDT  Workstation ID: [id number]    CT Chest Without Contrast Diagnostic    Result Date: 4/24/2024  CT CHEST WO CONTRAST DIAGNOSTIC Date of Exam: 4/24/2024 12:13 PM EDT Indication: Cough, sepsis, unclear source. Comparison: CT chest 4/12/2024 Technique: Axial CT images were obtained of the chest without contrast administration.  Reconstructed coronal and sagittal images were also obtained. Automated exposure control and iterative construction methods were used. Findings: MEDIASTINUM: The heart size is stable. Trace pericardial fluid. Right chest wall AICD. Left chest wall infusion port with tip in the SVC. CORONARY ARTERIES: There is calcified atherosclerotic disease. LUNGS: There are postsurgical changes of the right lower lung with inferomedial scarring posteriorly. There is a loculated right pleural effusion tracking  into the fissure, similar to the previous study. There is diffuse emphysema with biapical scarring. LYMPH NODES: Mildly prominent mediastinal lymph nodes are unchanged. There are small calcified subcarinal and left hilar nodes. OSSEOUS STRUCTURES: Multilevel thoracic degenerative disc disease. Stable healing right-sided rib fractures. No acute osseous abnormality.     Impression: 1. Stable postsurgical changes of the right lower lung with scarring and chronic appearing loculated right pleural effusion. 2. Mild emphysema and biapical scarring. Electronically Signed: Aimee Freitas MD  4/24/2024 12:37 PM EDT  Workstation ID: [id number]    XR Chest 1 View    Result Date: 4/24/2024  XR CHEST 1 VW Date of Exam: 4/24/2024 10:15 AM EDT Indication: Sepsis, cough Comparison: 4/12/2024 Findings: Cardiomediastinal silhouette is stable. There are postsurgical changes of the right lung with a small chronic effusion. Left lung is clear. Left-sided port catheter with the tip in the SVC. Right AICD. No acute osseous abnormality identified.     Impression: Stable chronic appearance of the chest. No acute cardiopulmonary abnormality. Electronically Signed: Aimee Freitas MD  4/24/2024 10:37 AM EDT  Workstation ID: [id number]    CT Chest Without Contrast Diagnostic    Result Date: 4/12/2024  CT ABDOMEN PELVIS WO CONTRAST, CT CHEST WO CONTRAST DIAGNOSTIC Date of Exam: 4/12/2024 8:00 PM EDT Indication: Sepsis. Comparison: 3/28/2024 PET/CT; Technique: Axial CT images were obtained of the chest, abdomen and pelvis without the administration of contrast. Reconstructed coronal and sagittal images were also obtained. Automated exposure control and iterative construction methods were used. Findings: CT chest: The central airways are patent. Redemonstrated postsurgical changes of the right lung with medial inferior scarring and small adjacent loculated pleural effusion. There is background emphysematous changes with biapical pleural-parenchymal  scarring. Scattered calcified granulomas. No suspicious pulmonary nodule or mass. No focal airspace consolidation. Right chest wall cardiac device distal lead tips in unchanged position. Left chest wall port with distal lead tip overlying the superior vena cava. The heart is unchanged in size. Coronary artery calcifications. No pericardial effusion. No mediastinal mass. Prominent mediastinal lymph nodes. Prominent right supraclavicular lymph node. Chest wall soft tissues show no acute abnormality. CT abdomen/pelvis: There is no suspicious hepatic lesion. The gallbladder is unremarkable. No significant biliary ductal dilation. The spleen, pancreas, and bilateral adrenal glands are unchanged. As compared to 3/28/2024 PET/CT, there is edematous appearance of the bilateral kidneys with extensive bilateral perinephric fat stranding. No hydronephrosis or nephrolithiasis. Small hiatal hernia. No evidence of bowel obstruction. No suspicious abdominal or pelvic lymphadenopathy. No abdominal aortic aneurysm. Atherosclerotic calcifications of the aorta and branch vessels. There is circumferential wall thickening of the urinary bladder. The prostate is enlarged with prostatic calcifications. Abdominal and pelvic wall soft tissues show no acute abnormality. Small bilateral fat-containing inguinal hernias. Redemonstrated right lateral fifth and sixth rib fractures which are healing. There is no evidence of acute fracture or suspicious osseous lesion.     Impression: Findings concerning for bilateral pyelonephritis. Circumferential wall thickening of the urinary bladder which can be seen with cystitis. No evidence of abscess formation, hydronephrosis, or other complication. Additional chronic findings as above including surgical changes of the right lung and healing right lateral fifth and sixth rib fractures. Small hiatal hernia. Previously seen mildly prominent hypermetabolic mediastinal and right supraclavicular lymph nodes are  unchanged. Electronically Signed: Raulito Light MD  4/12/2024 8:22 PM EDT  Workstation ID: [id number]    CT Abdomen Pelvis Without Contrast    Result Date: 4/12/2024  CT ABDOMEN PELVIS WO CONTRAST, CT CHEST WO CONTRAST DIAGNOSTIC Date of Exam: 4/12/2024 8:00 PM EDT Indication: Sepsis. Comparison: 3/28/2024 PET/CT; Technique: Axial CT images were obtained of the chest, abdomen and pelvis without the administration of contrast. Reconstructed coronal and sagittal images were also obtained. Automated exposure control and iterative construction methods were used. Findings: CT chest: The central airways are patent. Redemonstrated postsurgical changes of the right lung with medial inferior scarring and small adjacent loculated pleural effusion. There is background emphysematous changes with biapical pleural-parenchymal scarring. Scattered calcified granulomas. No suspicious pulmonary nodule or mass. No focal airspace consolidation. Right chest wall cardiac device distal lead tips in unchanged position. Left chest wall port with distal lead tip overlying the superior vena cava. The heart is unchanged in size. Coronary artery calcifications. No pericardial effusion. No mediastinal mass. Prominent mediastinal lymph nodes. Prominent right supraclavicular lymph node. Chest wall soft tissues show no acute abnormality. CT abdomen/pelvis: There is no suspicious hepatic lesion. The gallbladder is unremarkable. No significant biliary ductal dilation. The spleen, pancreas, and bilateral adrenal glands are unchanged. As compared to 3/28/2024 PET/CT, there is edematous appearance of the bilateral kidneys with extensive bilateral perinephric fat stranding. No hydronephrosis or nephrolithiasis. Small hiatal hernia. No evidence of bowel obstruction. No suspicious abdominal or pelvic lymphadenopathy. No abdominal aortic aneurysm. Atherosclerotic calcifications of the aorta and branch vessels. There is circumferential wall thickening of the  urinary bladder. The prostate is enlarged with prostatic calcifications. Abdominal and pelvic wall soft tissues show no acute abnormality. Small bilateral fat-containing inguinal hernias. Redemonstrated right lateral fifth and sixth rib fractures which are healing. There is no evidence of acute fracture or suspicious osseous lesion.     Impression: Findings concerning for bilateral pyelonephritis. Circumferential wall thickening of the urinary bladder which can be seen with cystitis. No evidence of abscess formation, hydronephrosis, or other complication. Additional chronic findings as above including surgical changes of the right lung and healing right lateral fifth and sixth rib fractures. Small hiatal hernia. Previously seen mildly prominent hypermetabolic mediastinal and right supraclavicular lymph nodes are unchanged. Electronically Signed: Raulito Light MD  4/12/2024 8:22 PM EDT  Workstation ID: [id number]    XR Chest 1 View    Result Date: 4/12/2024  XR CHEST 1 VW Date of Exam: 4/12/2024 6:56 PM EDT Indication: Weak/Dizzy/AMS triage protocol Comparison: 2/21/2024 CT Findings: Right chest wall cardiac device distal lead tips in unchanged position. Left chest wall port with distal lead tip overlying the superior vena cava. There is a chronic small right-sided pleural effusion. No evidence of left pleural effusion. No new focal airspace consolidation. No pneumothorax. No acute osseous abnormality.     Impression: No acute cardiopulmonary abnormality. Chronic findings as above. Electronically Signed: Raulito Light MD  4/12/2024 7:26 PM EDT  Workstation ID: [id number]      ASSESSMENT/PLAN:  Non-small cell lung cancer  History of immunotherapy  ARACELI  Recurrent hypotension  Recurrent leukocytosis  -Last Opdivo 4/4/2024  -Hold further therapy given his recurrent infections.  I think it is unlikely that immunotherapy is contributing to his current presentation as endocrinopathy workup from last admission was negative and his  symptoms resolved without steroids as well as with the addition of antibiotics.  -Imaging reviewed and shows perinephric stranding.  There is no obvious cancer recurrence on imaging.    6.  Access  -He has an indwelling port.  It is okay from my standpoint for port to be permanently removed if there is any concern regarding this being a source of infection.  He also has an AICD and a DAMIÁN is being considered.        Neetu Ashley MD    5/7/2024

## 2024-05-07 NOTE — PROGRESS NOTES
"Critical Care Note     LOS: 1 day   Patient Care Team:  Hayley Castellanos APRN as PCP - General (Nurse Practitioner)  Octaviano Sampson MD as Consulting Physician (Pulmonary Disease)  Neetu Ashley MD as Referring Physician (Hematology and Oncology)  Nimo Rodríguez MD as Consulting Physician (Radiation Oncology)    Chief Complaint/Reason for visit:    Chief Complaint   Patient presents with    Hypotension    Vomiting    Nausea   Septic shock  Recurrent pyelonephritis  Acute on chronic kidney disease      Subjective     Interval History:     He is afebrile.  He is now off norepinephrine.  He received a dose of midodrine this morning and blood pressure is now 140 systolic.  He is very oliguric.    Review of Systems:    All systems were reviewed and negative except as noted in subjective.    Medical history, surgical history, social history, family history reviewed    Objective     Intake/Output:    Intake/Output Summary (Last 24 hours) at 5/7/2024 1427  Last data filed at 5/7/2024 1300  Gross per 24 hour   Intake 1361.1 ml   Output 170 ml   Net 1191.1 ml       Nutrition:  Diet: Renal, Regular/House; Low Sodium (2-3g), Low Potassium; Fluid Consistency: Thin (IDDSI 0)  NPO Diet NPO Type: Strict NPO    Infusions:       Mechanical Ventilator Settings:                                                Telemetry: Paced             Vital Signs  Blood pressure 140/79, pulse 70, temperature 97.3 °F (36.3 °C), temperature source Oral, resp. rate 18, height 182.9 cm (72\"), weight 78.7 kg (173 lb 9.6 oz), SpO2 97%.    Physical Exam:  General Appearance:  Older gentleman sitting upright in bed in no distress   Head:  Atraumatic   Eyes:          No jaundice   Ears:     Throat: Oral mucosa moist   Neck: Trachea midline, no crepitus   Back:      Lungs:   Breath sounds are bilateral, clear anteriorly, diminished right base    Heart:  Regular rhythm, S1, S2 auscultated, paced rhythm   Abdomen:      Rectal:   Deferred "   Extremities: No pitting edema   Pulses:    Skin: Warm and dry   Lymph nodes: No cervical adenopathy   Neurologic: Alert, oriented, no focal weakness      Results Review:     I reviewed the patient's new clinical results.   Results from last 7 days   Lab Units 05/07/24  0502 05/06/24  1213 05/06/24  0909 05/06/24  0035 05/05/24 2352 05/03/24  1632   SODIUM mmol/L 131*  --  136  --  141 138   POTASSIUM mmol/L 4.5 4.7 5.2  --  4.1 3.9   CHLORIDE mmol/L 99  --  105  --  106 102   CO2 mmol/L 17.0*  --  17.0*  --  20.0* 21.7*   BUN mg/dL 36*  --  28*  --  23 18   CREATININE mg/dL 5.11*  --  3.70* 3.30* 2.80* 2.22*   CALCIUM mg/dL 8.2*  --  8.4*  --  8.7 10.0   BILIRUBIN mg/dL 0.3  --   --   --  0.4 0.3   ALK PHOS U/L 72  --   --   --  76 89   ALT (SGPT) U/L 13  --   --   --  22 17   AST (SGOT) U/L 18  --   --   --  30 22   GLUCOSE mg/dL 93  --  128*  --  114* 124*     Results from last 7 days   Lab Units 05/07/24  0502 05/06/24  0035 05/05/24 2352 05/03/24  1632   WBC 10*3/mm3 28.99*  --  33.46* 12.84*   HEMOGLOBIN g/dL 9.4*  --  12.0* 11.1*   HEMOGLOBIN, POC g/dL  --  13.6  --   --    HEMATOCRIT % 29.0*  --  38.9 34.7*   HEMATOCRIT POC %  --  40  --   --    PLATELETS 10*3/mm3 175  --  245 326         Lab Results   Component Value Date    BLOODCX No growth at 24 hours 05/06/2024     Lab Results   Component Value Date    URINECX No growth 05/06/2024       I reviewed the patient's new imaging including images and reports.      All medications reviewed.   budesonide-formoterol, 2 puff, Inhalation, BID - RT   And  tiotropium bromide monohydrate, 2 puff, Inhalation, Daily - RT  cefepime, 2,000 mg, Intravenous, Q24H  famotidine, 20 mg, Oral, Daily  heparin (porcine), 5,000 Units, Subcutaneous, Q12H  insulin lispro, 2-7 Units, Subcutaneous, 4x Daily AC & at Bedtime  midodrine, 10 mg, Oral, Q8H  senna-docusate sodium, 2 tablet, Oral, BID  sodium chloride, 10 mL, Intravenous, Q12H          Assessment & Plan       Acute  pyelonephritis    Mixed hyperlipidemia    Centrilobular emphysema    Tobacco abuse    Malignant neoplasm of lower lobe of right lung    Primary hypertension    GERD without esophagitis    Septic shock    ARACELI (acute kidney injury)    CAD (coronary artery disease)    Pyelonephritis    74-year-old gentleman with a history of non-small cell lung cancer post neoadjuvant chemoradiation and right lower lobectomy.  He is currently on immunotherapy.  He now presents with hypotension, recurrent pyelonephritis.  After his most recent episode he was discharged home on Levaquin every other day.  He has never had a positive culture.  Repeat CT scan of the abdomen revealed perinephric stranding.  On this admission he received volume resuscitation and norepinephrine.  Baseline creatinine is 2.3-2.5.  Creatinine has increased daily since admission with his hypotension and vasopressors.  Today it is 5.11.  He is very oliguric making only 190 mL of urine in the last 24 hours.  Potassium is 4.5 and he has not exhibited signs of pulmonary edema yet.    PLAN:    -Currently on cefepime 2 g every 24 hours  -Does he need urology assessment and possible retrograde pyelogram or uteroscopy for culture?    -Infectious disease wants a DAMIÁN to assess his AICD and rule out endocarditis    -Continue to monitor renal function  - nephrology following  -Would consider a trial of diuretics today since blood pressure has normalized    -Continue home midodrine dose  -Sliding scale insulin    -Symbicort  -Spiriva  -DuoNeb    -Pepcid  -Subcutaneous heparin    -Telemetry    Addendum: I did discuss with infectious disease, this is now his third admission with sepsis and leukocytosis.  Imaging does reveal some stranding around the kidneys, and fluid as well, and we are assuming this is pyelonephritis.  Urinalysis does have white cells but urine cultures have been negative as well as blood cultures.  I debated asking urology to evaluate to see if a cystoscope  and getting cultures from the kidney would be helpful.  However he really does not have obstruction or hydronephrosis.  He does have some diverticular changes but nothing to suggest acute diverticulitis.  He has a little bit of pleural thickening in his right base that is most likely postthoracotomy changes and I do not think an acute infection.  Infectious disease was wondering if we should do a DAMIÁN to evaluate his ICD pacemaker to see if there is any vegetations on the wires.  I would expect positive blood cultures if he had infected device.  1 thing is for sure he is getting these episodes where his white count increases to almost 40,000, he feels terrible and he is shocky with hypotension.    Soledad Colmenares MD  05/07/24  14:27 EDT      Time: Critical care 25 min  I personally provided care to this critically ill patient as documented above.  Critical care time does not include time spent on separately billed procedures.  None of my critical care time was concurrent with other critical care providers.

## 2024-05-07 NOTE — PROGRESS NOTES
"   LOS: 1 day    Patient Care Team:  Hayley Castellanos APRN as PCP - General (Nurse Practitioner)  Octaviano Sampson MD as Consulting Physician (Pulmonary Disease)  Neetu Ashley MD as Referring Physician (Hematology and Oncology)  Nimo Rodríguez MD as Consulting Physician (Radiation Oncology)    Subjective     Denies any chest pain or shortness of breath.  Still not making much urine.  Denies any feelings of urinary retention.  Patient did develop nausea today.  Long discussion with patient and wife at bedside about possible need for dialysis catheter in the morning.    Objective     Vital Signs:  Blood pressure 115/69, pulse 72, temperature 98.3 °F (36.8 °C), temperature source Oral, resp. rate 16, height 182.9 cm (72\"), weight 78.7 kg (173 lb 9.6 oz), SpO2 97%.      Intake/Output Summary (Last 24 hours) at 5/7/2024 0952  Last data filed at 5/7/2024 0600  Gross per 24 hour   Intake 1136.24 ml   Output 190 ml   Net 946.24 ml        05/06 0701 - 05/07 0700  In: 1136.2 [I.V.:1136.2]  Out: 190 [Urine:190]    Physical Exam:    GENERAL: WD WM NAD  NEURO: Awake and alert, oriented. No focal deficit  PSYCHIATRIC: NMA. Cooperative with PE  EYE: PE, no icterus, no conjunctivitis  ENT: ommm, dentition intact,  Hearing intact  NECK:  No JVD discernable,  Trachea midline  CV: No edema, RRR  LUNGS:  Quiet,  Nonlabored resp.  Symmetrical expansion  ABDOMEN: Nondistended, soft nontender.  : No Yeung, no palp bladder  SKIN: Warm and dry without rash         Labs:  Results from last 7 days   Lab Units 05/07/24  0502 05/06/24  0035 05/05/24  2352 05/03/24  1632   WBC 10*3/mm3 28.99*  --  33.46* 12.84*   HEMOGLOBIN g/dL 9.4*  --  12.0* 11.1*   HEMOGLOBIN, POC g/dL  --  13.6  --   --    PLATELETS 10*3/mm3 175  --  245 326     Results from last 7 days   Lab Units 05/07/24  0502 05/06/24  1213 05/06/24  0909 05/06/24  0035 05/05/24  2352 05/03/24  1632   SODIUM mmol/L 131*  --  136  --  141 138   POTASSIUM mmol/L 4.5 4.7 5.2  " --  4.1 3.9   CHLORIDE mmol/L 99  --  105  --  106 102   CO2 mmol/L 17.0*  --  17.0*  --  20.0* 21.7*   BUN mg/dL 36*  --  28*  --  23 18   CREATININE mg/dL 5.11*  --  3.70* 3.30* 2.80* 2.22*   CALCIUM mg/dL 8.2*  --  8.4*  --  8.7 10.0   PHOSPHORUS mg/dL 3.1  --   --   --   --   --    MAGNESIUM mg/dL 2.3  --   --   --  1.4* 2.0   ALBUMIN g/dL 2.7*  --   --   --  3.2* 4.1     Results from last 7 days   Lab Units 05/07/24  0502   ALK PHOS U/L 72   BILIRUBIN mg/dL 0.3   ALT (SGPT) U/L 13   AST (SGOT) U/L 18                   Estimated Creatinine Clearance: 13.9 mL/min (A) (by C-G formula based on SCr of 5.11 mg/dL (H)).         A/P:      ARF: Creatinine continues to rise.  Urine output oliguric.  Patient with recurrent ARF with improvement to 2.0 on last discharge..  Creatinine creatinine back up to 2.8 on admission with profound hypotension.  Current injury likely due to recurrence of ATN i with hemodynamic instability.  Patient with new findings of proteinuria.  See initial consult note for details..  Hopefully will see signs of recovery soon with improved hemodynamics.  For now continue monitor renal function closely.   No indication for dialysis at this time may be required in the next 24 to 48 hours if no signs of improvement.  Will make n.p.o. after midnight for possible dialysis catheter.  .     Sepsis: Resolving.  Off pressors.  Blood pressure increased today on midodrine.  Will decrease midodrine to 5 mg 3 times daily.  Hold if blood pressure remains elevated.    Proteinuria: Patient with 8.8 g proteinuria on ratio.  Previously patient had a microalbumin of 9.  SPEP negative in the past.  Patient with recurrent acute renal failure and perinephric stranding previously thought due to pyelonephritis.  Cultures have been negative.  May have other inflammatory process     Hyponatremia: Sodium down to 131.  All fluids isotonic.     Metabolic acidosis: Bicarb quite low.  Patient initially with elevated lactic acid  which has improved from 3.8 down to 1.7 this morning.  For now continue perfusion support.     Volume: No edema.  Chest x-ray stable.  Patient on room air.  Hold IV fluids for now.  Strict I's and O's.     ID:  Patient with sudden onset of chills and profound hypotension.  Required pressors and volume for blood pressure stabilization.  Developed significant leukocytosis with white count in the 40K range.  Patient has been able to wean off pressors.  Leukocytosis improving.  Despite multiple episodes and hospitalizations, underlying etiology remains unknown. Was on Levaquin as outpatient.  CT scan with possible pyelonephritis but cultures have been negative previously.  ID continues to evaluate.     Leukocytosis: Presumably due to underlying infectious process.  Was present last admission with resolution at time of discharge.     GI upset: Has been recurrent over the past month with recurrent admissions.  Denies symptoms yesterday, however has developed nausea today.     History of small cell lung cancer: Post immuno therapy with Opdivo.  Last infusion 4/4/2024.  Follows with Dr. Ashley.     High risk and complexity patient.         Kam Gomez MD  05/07/24  09:52 EDT

## 2024-05-07 NOTE — CONSULTS
"          Clinical Nutrition Assessment     Patient Name: David Barfield  YOB: 1950  MRN: [id number]  Date of Encounter: 05/06/24 21:43 EDT  Admission date: 5/5/2024  Reason for Visit: Identified at risk by screening criteria, MST score 2+, Malnutrition Severity Assessment    Assessment   Nutrition Assessment   Admission Diagnosis:  Pyelonephritis [N12]    Problem List:    Acute pyelonephritis    Mixed hyperlipidemia    Centrilobular emphysema    Tobacco abuse    Malignant neoplasm of lower lobe of right lung    Primary hypertension    GERD without esophagitis    Septic shock    ARACELI (acute kidney injury)    CAD (coronary artery disease)    Pyelonephritis      PMH:   He  has a past medical history of Abnormal ECG, Arrhythmia (2019), Asthma (2019), Bronchogenic cancer of right lung (10/04/2023), Coronary artery disease (2019), Diabetes mellitus (Borderline), Emphysema/COPD, Erectile disorder, GERD (gastroesophageal reflux disease), History of chemotherapy, Hyperlipidemia, Hypertension (2019), Lung nodule, Mumps, Mumps, Pruritus, Slow to wake up after anesthesia, Wears dentures, and Wears hearing aid in both ears.    PSH:  He  has a past surgical history that includes Bone biopsy; Facial fracture surgery; Cardiac electrophysiology procedure (N/A, 08/17/2021); BRONCHOSCOPY WITH ION ROBOTIC ASSIST (N/A, 09/15/2023); Pacemaker Implantation; bronchoscopy thoracotomy (Right, 01/09/2024); and Lymph node biopsy (2023).    Applicable Nutrition History:   Note lobectomy on 4/4/24    Anthropometrics     Height: Height: 182.9 cm (72\")  Last Filed Weight: Weight: 78.7 kg (173 lb 9.6 oz) (05/05/24 8162)  Method:  unk  BMI: BMI (Calculated): 23.5    UBW:  Wt of 189 lbs on 2/15/24, 181 lbs on 2/21/24 and 3/27/24 Wt of 176 lbs on 4/10/24  Weight change:  Loss of 16 lbs 8% body wt over 2 mo Note Loss of 8 lbs bt 3/27 and 4/10 - 2 weeks.    Nutrition Focused Physical Exam    Date: 5/6    Patient meets criteria for " malnutrition diagnosis, see MSA note.     Subjective   Reported/Observed/Food/Nutrition Related History:     5/6  Pt allows loss of taste for last 3 mo w depressed intake. Allows does not like suppl.     Current Nutrition Prescription   PO: Diet: Renal, Regular/House; Low Sodium (2-3g), Low Potassium, Low Phosphorus; Fluid Consistency: Thin (IDDSI 0)  Oral Nutrition Supplement:   Intake: Insufficient data    Assessment & Plan   Nutrition Diagnosis   Date:  5/6            Updated:    Problem Malnutrition  severe chronoic   Etiology Effect taste alteration on intake   Signs/Symptoms Wt Intake hx w Wasting   Status: ongoing      Goal / Objectives:   Nutrition to support treatment and Establish PO      Nutrition Intervention      Follow treatment progress, Care plan reviewed, Advise alternate selection, Supplement offered/refused    Note pt on diet w renal restriction for K+ & Phos - neither > normal range at this time.  K+ did increase from prev value. Phos prev low.  Did remove Phos restriction.   Watch if can liberalize diet further.    Monitoring/Evaluation:   Per protocol, I&O, PO intake, Pertinent labs, Weight, Symptoms      Ellie Dominguez RD  Time Spent: 30  min

## 2024-05-07 NOTE — PLAN OF CARE
Goal Outcome Evaluation:     Problem: Adult Inpatient Plan of Care  Goal: Plan of Care Review  Outcome: Ongoing, Progressing  Goal: Patient-Specific Goal (Individualized)  Outcome: Ongoing, Progressing  Goal: Absence of Hospital-Acquired Illness or Injury  Outcome: Ongoing, Progressing  Goal: Optimal Comfort and Wellbeing  Outcome: Ongoing, Progressing  Goal: Readiness for Transition of Care  Outcome: Ongoing, Progressing     Problem: Adjustment to Illness (Sepsis/Septic Shock)  Goal: Optimal Coping  Outcome: Ongoing, Progressing     Problem: Bleeding (Sepsis/Septic Shock)  Goal: Absence of Bleeding  Outcome: Ongoing, Progressing     Problem: Glycemic Control Impaired (Sepsis/Septic Shock)  Goal: Blood Glucose Level Within Desired Range  Outcome: Ongoing, Progressing     Problem: Infection Progression (Sepsis/Septic Shock)  Goal: Absence of Infection Signs and Symptoms  Outcome: Ongoing, Progressing

## 2024-05-08 LAB
ALBUMIN SERPL-MCNC: 2.5 G/DL (ref 3.5–5.2)
ANION GAP SERPL CALCULATED.3IONS-SCNC: 15 MMOL/L (ref 5–15)
BASOPHILS # BLD AUTO: 0.08 10*3/MM3 (ref 0–0.2)
BASOPHILS NFR BLD AUTO: 0.5 % (ref 0–1.5)
BUN SERPL-MCNC: 47 MG/DL (ref 8–23)
BUN/CREAT SERPL: 7.4 (ref 7–25)
CALCIUM SPEC-SCNC: 8.4 MG/DL (ref 8.6–10.5)
CHLORIDE SERPL-SCNC: 97 MMOL/L (ref 98–107)
CO2 SERPL-SCNC: 16 MMOL/L (ref 22–29)
CREAT SERPL-MCNC: 6.37 MG/DL (ref 0.76–1.27)
DEPRECATED RDW RBC AUTO: 56.3 FL (ref 37–54)
EGFRCR SERPLBLD CKD-EPI 2021: 8.5 ML/MIN/1.73
EOSINOPHIL # BLD AUTO: 0.25 10*3/MM3 (ref 0–0.4)
EOSINOPHIL NFR BLD AUTO: 1.5 % (ref 0.3–6.2)
ERYTHROCYTE [DISTWIDTH] IN BLOOD BY AUTOMATED COUNT: 16.4 % (ref 12.3–15.4)
GLUCOSE BLDC GLUCOMTR-MCNC: 96 MG/DL (ref 70–130)
GLUCOSE SERPL-MCNC: 90 MG/DL (ref 65–99)
HCT VFR BLD AUTO: 27.9 % (ref 37.5–51)
HGB BLD-MCNC: 9.1 G/DL (ref 13–17.7)
IMM GRANULOCYTES # BLD AUTO: 0.09 10*3/MM3 (ref 0–0.05)
IMM GRANULOCYTES NFR BLD AUTO: 0.5 % (ref 0–0.5)
INR PPP: 1.47 (ref 0.89–1.12)
LYMPHOCYTES # BLD AUTO: 0.8 10*3/MM3 (ref 0.7–3.1)
LYMPHOCYTES NFR BLD AUTO: 4.8 % (ref 19.6–45.3)
MCH RBC QN AUTO: 30.2 PG (ref 26.6–33)
MCHC RBC AUTO-ENTMCNC: 32.6 G/DL (ref 31.5–35.7)
MCV RBC AUTO: 92.7 FL (ref 79–97)
MONOCYTES # BLD AUTO: 1.47 10*3/MM3 (ref 0.1–0.9)
MONOCYTES NFR BLD AUTO: 8.9 % (ref 5–12)
NEUTROPHILS NFR BLD AUTO: 13.85 10*3/MM3 (ref 1.7–7)
NEUTROPHILS NFR BLD AUTO: 83.8 % (ref 42.7–76)
NRBC BLD AUTO-RTO: 0 /100 WBC (ref 0–0.2)
PHOSPHATE SERPL-MCNC: 4.4 MG/DL (ref 2.5–4.5)
PLATELET # BLD AUTO: 202 10*3/MM3 (ref 140–450)
PMV BLD AUTO: 9.6 FL (ref 6–12)
POTASSIUM SERPL-SCNC: 4.5 MMOL/L (ref 3.5–5.2)
PROTHROMBIN TIME: 18 SECONDS (ref 12.2–14.5)
RBC # BLD AUTO: 3.01 10*6/MM3 (ref 4.14–5.8)
SODIUM SERPL-SCNC: 128 MMOL/L (ref 136–145)
WBC NRBC COR # BLD AUTO: 16.54 10*3/MM3 (ref 3.4–10.8)

## 2024-05-08 PROCEDURE — 63710000001 ONDANSETRON ODT 4 MG TABLET DISPERSIBLE: Performed by: HOSPITALIST

## 2024-05-08 PROCEDURE — 94761 N-INVAS EAR/PLS OXIMETRY MLT: CPT

## 2024-05-08 PROCEDURE — 80069 RENAL FUNCTION PANEL: CPT | Performed by: INTERNAL MEDICINE

## 2024-05-08 PROCEDURE — 85610 PROTHROMBIN TIME: CPT | Performed by: INTERNAL MEDICINE

## 2024-05-08 PROCEDURE — 94799 UNLISTED PULMONARY SVC/PX: CPT

## 2024-05-08 PROCEDURE — 82948 REAGENT STRIP/BLOOD GLUCOSE: CPT

## 2024-05-08 PROCEDURE — 85025 COMPLETE CBC W/AUTO DIFF WBC: CPT | Performed by: INTERNAL MEDICINE

## 2024-05-08 PROCEDURE — 94664 DEMO&/EVAL PT USE INHALER: CPT

## 2024-05-08 PROCEDURE — 25010000002 CEFEPIME PER 500 MG: Performed by: INTERNAL MEDICINE

## 2024-05-08 PROCEDURE — 0 DEXTROSE 5 % SOLUTION 1,000 ML FLEX CONT: Performed by: STUDENT IN AN ORGANIZED HEALTH CARE EDUCATION/TRAINING PROGRAM

## 2024-05-08 PROCEDURE — 97161 PT EVAL LOW COMPLEX 20 MIN: CPT

## 2024-05-08 PROCEDURE — 99232 SBSQ HOSP IP/OBS MODERATE 35: CPT | Performed by: HOSPITALIST

## 2024-05-08 RX ORDER — ONDANSETRON 4 MG/1
4 TABLET, ORALLY DISINTEGRATING ORAL EVERY 6 HOURS PRN
Status: DISCONTINUED | OUTPATIENT
Start: 2024-05-08 | End: 2024-05-12 | Stop reason: HOSPADM

## 2024-05-08 RX ORDER — ONDANSETRON 2 MG/ML
4 INJECTION INTRAMUSCULAR; INTRAVENOUS EVERY 6 HOURS PRN
Status: DISCONTINUED | OUTPATIENT
Start: 2024-05-08 | End: 2024-05-12 | Stop reason: HOSPADM

## 2024-05-08 RX ORDER — MIDODRINE HYDROCHLORIDE 5 MG/1
5 TABLET ORAL EVERY 8 HOURS PRN
Status: DISCONTINUED | OUTPATIENT
Start: 2024-05-08 | End: 2024-05-12 | Stop reason: HOSPADM

## 2024-05-08 RX ADMIN — SODIUM BICARBONATE 150 MEQ: 84 INJECTION, SOLUTION INTRAVENOUS at 22:08

## 2024-05-08 RX ADMIN — BUDESONIDE AND FORMOTEROL FUMARATE DIHYDRATE 2 PUFF: 160; 4.5 AEROSOL RESPIRATORY (INHALATION) at 10:38

## 2024-05-08 RX ADMIN — FAMOTIDINE 20 MG: 20 TABLET, FILM COATED ORAL at 09:31

## 2024-05-08 RX ADMIN — MIDODRINE HYDROCHLORIDE 5 MG: 5 TABLET ORAL at 05:30

## 2024-05-08 RX ADMIN — SENNOSIDES AND DOCUSATE SODIUM 2 TABLET: 8.6; 5 TABLET ORAL at 09:31

## 2024-05-08 RX ADMIN — TIOTROPIUM BROMIDE INHALATION SPRAY 2 PUFF: 3.12 SPRAY, METERED RESPIRATORY (INHALATION) at 10:38

## 2024-05-08 RX ADMIN — CEFEPIME 2000 MG: 2 INJECTION, POWDER, FOR SOLUTION INTRAVENOUS at 09:31

## 2024-05-08 RX ADMIN — Medication 10 ML: at 09:31

## 2024-05-08 RX ADMIN — BUDESONIDE AND FORMOTEROL FUMARATE DIHYDRATE 2 PUFF: 160; 4.5 AEROSOL RESPIRATORY (INHALATION) at 19:10

## 2024-05-08 RX ADMIN — SENNOSIDES AND DOCUSATE SODIUM 2 TABLET: 8.6; 5 TABLET ORAL at 22:08

## 2024-05-08 RX ADMIN — ONDANSETRON 4 MG: 4 TABLET, ORALLY DISINTEGRATING ORAL at 13:33

## 2024-05-08 NOTE — CASE MANAGEMENT/SOCIAL WORK
Continued Stay Note  [facility]     Patient Name: David Barfield  MRN: [id number]  Today's Date: 5/8/2024    Admit Date: 5/5/2024    Plan: home   Discharge Plan       Row Name 05/08/24 1346       Plan    Plan home    Patient/Family in Agreement with Plan yes    Plan Comments Met with Mr. Barfield and his significant other at the bedside to discuss discharge plan. His plan is back home with significant other, and she will transport. He denied discharge needs at this time.  will continue to follow plan of care and assist with discharge planning needs as indicated.    Final Discharge Disposition Code 01 - home or self-care                   Discharge Codes    No documentation.                 Expected Discharge Date and Time       Expected Discharge Date Expected Discharge Time    May 9, 2024               Kaylen Godoy RN

## 2024-05-08 NOTE — PROGRESS NOTES
[facility] Medicine Services  PROGRESS NOTE    Patient Name: David Barfield  : 1950  MRN: [id number]    Date of Admission: 2024  Primary Care Physician: Hayley Castellanos APRN    Subjective   Subjective     CC:  Septic shock    HPI:  No acute events over the night.  Hemodynamically stable.  Afebrile.  Creatinine is worsening at 6.3.  Sodium 128.  Patient is having better urine output today.  Nephrology on board.  White blood count is trending down.      Objective   Objective     Vital Signs:   Temp:  [97.3 °F (36.3 °C)-98.7 °F (37.1 °C)] 98.7 °F (37.1 °C)  Heart Rate:  [70-96] 86  Resp:  [18] 18  BP: (119-170)/(67-90) 119/72     Physical Exam:  Physical Exam  Vitals and nursing note reviewed.   Constitutional:       Appearance: Normal appearance.   HENT:      Head: Normocephalic and atraumatic.   Cardiovascular:      Rate and Rhythm: Normal rate.   Pulmonary:      Effort: Pulmonary effort is normal.   Abdominal:      General: Abdomen is flat. Bowel sounds are normal.   Skin:     General: Skin is warm.   Neurological:      General: No focal deficit present.      Mental Status: He is alert. Mental status is at baseline.   Psychiatric:         Mood and Affect: Mood normal.          Results Reviewed:  LAB RESULTS:      Lab 24  0413 24  0502 24  1152 24  0909 24  0518 24  0035 24  2352 24  1632   WBC 16.54* 28.99*  --   --   --   --  33.46* 12.84*   HEMOGLOBIN 9.1* 9.4*  --   --   --   --  12.0* 11.1*   HEMOGLOBIN, POC  --   --   --   --   --  13.6  --   --    HEMATOCRIT 27.9* 29.0*  --   --   --   --  38.9 34.7*   HEMATOCRIT POC  --   --   --   --   --  40  --   --    PLATELETS 202 175  --   --   --   --  245 326   NEUTROS ABS 13.85* 26.18*  --   --   --   --  29.67* 8.95*   IMMATURE GRANS (ABS) 0.09* 0.22*  --   --   --   --  0.34* 0.19*   LYMPHS ABS 0.80 0.96  --   --   --   --  1.32 1.89   MONOS ABS 1.47* 1.26*  --   --   --    --  1.94* 1.54*   EOS ABS 0.25 0.25  --   --   --   --  0.05 0.17   MCV 92.7 91.5  --   --   --   --  95.3 93.3   PROCALCITONIN  --   --   --   --   --   --  1.89*  --    LACTATE  --   --  1.7 2.8* 2.1*  --  3.8*  --    PROTIME 18.0*  --   --   --   --   --   --   --          Lab 05/08/24 0413 05/07/24 0502 05/06/24  1213 05/06/24  0909 05/06/24 0035 05/05/24 2352 05/03/24  1632   SODIUM 128* 131*  --  136  --  141 138   POTASSIUM 4.5 4.5 4.7 5.2  --  4.1 3.9   CHLORIDE 97* 99  --  105  --  106 102   CO2 16.0* 17.0*  --  17.0*  --  20.0* 21.7*   ANION GAP 15.0 15.0  --  14.0  --  15.0 14.3   BUN 47* 36*  --  28*  --  23 18   CREATININE 6.37* 5.11*  --  3.70* 3.30* 2.80* 2.22*   EGFR 8.5* 11.1*  --  16.3* 18.7* 22.8* 30.1*   GLUCOSE 90 93  --  128*  --  114* 124*   CALCIUM 8.4* 8.2*  --  8.4*  --  8.7 10.0   MAGNESIUM  --  2.3  --   --   --  1.4* 2.0   PHOSPHORUS 4.4 3.1  --   --   --   --   --    HEMOGLOBIN A1C  --   --   --   --   --   --  6.30*   TSH  --   --   --   --   --   --  1.510         Lab 05/08/24 0413 05/07/24 0502 05/05/24 2352 05/03/24  1632   TOTAL PROTEIN  --  6.1 7.1 7.8   ALBUMIN 2.5* 2.7* 3.2* 4.1   GLOBULIN  --  3.4 3.9 3.7   ALT (SGPT)  --  13 22 17   AST (SGOT)  --  18 30 22   BILIRUBIN  --  0.3 0.4 0.3   ALK PHOS  --  72 76 89   LIPASE  --   --  30  --          Lab 05/08/24  0413 05/06/24  0207 05/05/24 2352   HSTROP T  --  37* 30*   PROTIME 18.0*  --   --    INR 1.47*  --   --          Lab 05/03/24  1632   CHOLESTEROL 157   LDL CHOL 94   HDL CHOL 27*   TRIGLYCERIDES 206*         Lab 05/05/24  2352 05/03/24  1632   IRON  --  48*   FOLATE  --  13.40   VITAMIN B 12  --  491   ABO TYPING A  --    RH TYPING Negative  --    ANTIBODY SCREEN Negative  --          Brief Urine Lab Results  (Last result in the past 365 days)        Color   Clarity   Blood   Leuk Est   Nitrite   Protein   CREAT   Urine HCG        05/06/24 1206             60.0         05/06/24 1206 Yellow   Cloudy   Trace    Small (1+)   Negative   >=300 mg/dL (3+)                   Microbiology Results Abnormal       Procedure Component Value - Date/Time    Blood Culture - Blood, Arm, Right [[id number]]  (Normal) Collected: 05/06/24 0054    Lab Status: Preliminary result Specimen: Blood from Arm, Right Updated: 05/08/24 0115     Blood Culture No growth at 2 days    Blood Culture - Blood, Wrist, Right [[id number]]  (Normal) Collected: 05/06/24 1228    Lab Status: Preliminary result Specimen: Blood from Wrist, Right Updated: 05/08/24 0100     Blood Culture No growth at 2 days    Urine Culture - Urine, Urine, Clean Catch [[id number]]  (Normal) Collected: 05/06/24 1206    Lab Status: Final result Specimen: Urine, Clean Catch Updated: 05/07/24 0956     Urine Culture No growth    Gastrointestinal Panel, PCR - Stool, Per Rectum [[id number]]  (Normal) Collected: 05/07/24 0509    Lab Status: Final result Specimen: Stool from Per Rectum Updated: 05/07/24 0751     Campylobacter Not Detected     Plesiomonas shigelloides Not Detected     Salmonella Not Detected     Vibrio Not Detected     Vibrio cholerae Not Detected     Yersinia enterocolitica Not Detected     Enteroaggregative E. coli (EAEC) Not Detected     Enteropathogenic E. coli (EPEC) Not Detected     Enterotoxigenic E. coli (ETEC) lt/st Not Detected     Shiga-like toxin-producing E. coli (STEC) stx1/stx2 Not Detected     Shigella/Enteroinvasive E. coli (EIEC) Not Detected     Cryptosporidium Not Detected     Cyclospora cayetanensis Not Detected     Entamoeba histolytica Not Detected     Giardia lamblia Not Detected     Adenovirus F40/41 Not Detected     Astrovirus Not Detected     Norovirus GI/GII Not Detected     Rotavirus A Not Detected     Sapovirus (I, II, IV or V) Not Detected    Eosinophil Smear - Urine, Urine, Clean Catch [[id number]]  (Normal) Collected: 05/06/24 1206    Lab Status: Final result Specimen: Urine, Clean Catch Updated: 05/06/24 1716     Eosinophil Smear 0 % EOS/100 Cells      Narrative:      No eosinophil seen    Legionella Antigen, Urine - Urine, Urine, Clean Catch [[id number]]  (Normal) Collected: 05/06/24 0507    Lab Status: Final result Specimen: Urine, Clean Catch Updated: 05/06/24 0940     LEGIONELLA ANTIGEN, URINE Negative    S. Pneumo Ag Urine or CSF - Urine, Urine, Clean Catch [[id number]]  (Normal) Collected: 05/06/24 0507    Lab Status: Final result Specimen: Urine, Clean Catch Updated: 05/06/24 0940     Strep Pneumo Ag Negative    MRSA Screen, PCR (Inpatient) - Swab, Nares [[id number]]  (Normal) Collected: 05/06/24 0656    Lab Status: Final result Specimen: Swab from Nares Updated: 05/06/24 0831     MRSA PCR Negative    Narrative:      The negative predictive value of this diagnostic test is high and should only be used to consider de-escalating anti-MRSA therapy. A positive result may indicate colonization with MRSA and must be correlated clinically.  MRSA Negative    COVID PRE-OP / PRE-PROCEDURE SCREENING ORDER (NO ISOLATION) - Swab, Nasopharynx [[id number]]  (Normal) Collected: 05/05/24 2353    Lab Status: Final result Specimen: Swab from Nasopharynx Updated: 05/06/24 0102    Narrative:      The following orders were created for panel order COVID PRE-OP / PRE-PROCEDURE SCREENING ORDER (NO ISOLATION) - Swab, Nasopharynx.  Procedure                               Abnormality         Status                     ---------                               -----------         ------                     Respiratory Panel PCR w/...[[id number]]  Normal              Final result                 Please view results for these tests on the individual orders.    Respiratory Panel PCR w/COVID-19(SARS-CoV-2) [facility]NA/[facility]/JEROME In-House, NP Swab in UTM/VTM, 2 HR TAT - Swab, Nasopharynx [[id number]]  (Normal) Collected: 05/05/24 2353    Lab Status: Final result Specimen: Swab from Nasopharynx Updated: 05/06/24 0102     ADENOVIRUS, PCR Not Detected     Coronavirus 229E Not Detected      Coronavirus HKU1 Not Detected     Coronavirus NL63 Not Detected     Coronavirus OC43 Not Detected     COVID19 Not Detected     Human Metapneumovirus Not Detected     Human Rhinovirus/Enterovirus Not Detected     Influenza A PCR Not Detected     Influenza B PCR Not Detected     Parainfluenza Virus 1 Not Detected     Parainfluenza Virus 2 Not Detected     Parainfluenza Virus 3 Not Detected     Parainfluenza Virus 4 Not Detected     RSV, PCR Not Detected     Bordetella pertussis pcr Not Detected     Bordetella parapertussis PCR Not Detected     Chlamydophila pneumoniae PCR Not Detected     Mycoplasma pneumo by PCR Not Detected    Narrative:      In the setting of a positive respiratory panel with a viral infection PLUS a negative procalcitonin without other underlying concern for bacterial infection, consider observing off antibiotics or discontinuation of antibiotics and continue supportive care. If the respiratory panel is positive for atypical bacterial infection (Bordetella pertussis, Chlamydophila pneumoniae, or Mycoplasma pneumoniae), consider antibiotic de-escalation to target atypical bacterial infection.            No radiology results from the last 24 hrs    Results for orders placed during the hospital encounter of 04/24/24    Adult Transthoracic Echo Complete W/ Cont if Necessary Per Protocol    Interpretation Summary    Left ventricular ejection fraction appears to be 61 - 65%.    Left ventricular wall thickness is consistent with mild concentric hypertroph    There is calcification and thickening of the aortic valve.  No independently mobile vegetations visualized.    Mild aortic valve regurgitation is present    Mild dilation of the aortic root is present.  Measures 4.0 cm.    There are pacemaker leads noted in the right atrium/right ventricle.    Mild tricuspid valve regurgitation is present. Estimated right ventricular systolic pressure from tricuspid regurgitation is normal (<35 mmHg).    Mild mitral  valve regurgitation is present      Current medications:  Scheduled Meds:budesonide-formoterol, 2 puff, Inhalation, BID - RT   And  tiotropium bromide monohydrate, 2 puff, Inhalation, Daily - RT  cefepime, 2,000 mg, Intravenous, Q24H  famotidine, 20 mg, Oral, Daily  heparin (porcine), 5,000 Units, Subcutaneous, Q12H  insulin lispro, 2-7 Units, Subcutaneous, 4x Daily AC & at Bedtime  midodrine, 5 mg, Oral, Q8H  senna-docusate sodium, 2 tablet, Oral, BID  sodium chloride, 10 mL, Intravenous, Q12H      Continuous Infusions:   PRN Meds:.  acetaminophen **OR** acetaminophen    albuterol    senna-docusate sodium **AND** polyethylene glycol **AND** bisacodyl **AND** bisacodyl    dextrose    dextrose    glucagon (human recombinant)    HYDROcodone-acetaminophen    nitroglycerin    sodium chloride    sodium chloride    Assessment & Plan   Assessment & Plan     Active Hospital Problems    Diagnosis  POA    **Acute pyelonephritis [N10]  Yes    CAD (coronary artery disease) [I25.10]  Yes    Pyelonephritis [N12]  Yes    ARACELI (acute kidney injury) [N17.9]  Yes    Septic shock [A41.9, R65.21]  Yes    Primary hypertension [I10]  Yes    GERD without esophagitis [K21.9]  Yes    Malignant neoplasm of lower lobe of right lung [C34.31]  Yes    Tobacco abuse [Z72.0]  Yes    Centrilobular emphysema [J43.2]  Yes    Mixed hyperlipidemia [E78.2]  Yes      Resolved Hospital Problems   No resolved problems to display.        Brief Hospital Course to date:  David Barfield is a 74 y.o. male with history of non-small cell lung ca s/p neoadjuvant chemoimmunotherapy and RLL lobectomy currently on Opdivo (last 4/4/24), HTN, emphysema, presence of port and pacemaker, current tobacco use, recent admission 4/12-4/22 & 4/24 - 4/29 for sepsis related to pyelonephritis who was admitted on 05/05 for septic shock secondary to possible another episode of acute pyelonephritis.  Patient was started on pressors and antibiotic.  Patient also developed oliguric  ARACELI.  ID/nephrology/oncology consulted.  Patient now on cefepime and midodrine.  He was downgraded to medical floor on 05/07.    Acute pyelonephritis.  Recurrent  ARACELI.  Likely ATN.  Worsening.  Urine output improving today.  Septic shock secondary to the above.  Resolved  Metabolic acidosis/hyponatremia.  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  Continue breathing treatment    Plan:  Afebrile.  Blood cultures with no growth to date.  White blood count is trending down.Continue IV antibiotic.  Need DAMIÁN given the presence of AICD.  Possibly today.  Keep the patient n.p.o. for now.  Will likely need IV antibiotics as an outpatient.  ID on board. Off pressors.  On midodrine 5 mg 3 times daily.  Monitor urine output.  Nephrology on board.  If no improvement patient might need hemodialysis.  Oncology on board.  Hold further therapy in the setting of acute infection and ARACELI.  Continue breathing treatment.  Will need therapy eval.      I tried to call the patient's son (Gamaliel) with updates.  It went to voicemail.        Expected Discharge Location and Transportation: D pending therapy eval.  Expected Discharge   Expected Discharge Date: 5/9/2024; Expected Discharge Time:      DVT prophylaxis:  Medical DVT prophylaxis orders are present.         AM-PAC 6 Clicks Score (PT): 20 (05/07/24 2000)    CODE STATUS:   Code Status and Medical Interventions:   Ordered at: 05/06/24 0331     Code Status (Patient has no pulse and is not breathing):    CPR (Attempt to Resuscitate)     Medical Interventions (Patient has pulse or is breathing):    Full Support       Kael Wiggins MD  05/08/24

## 2024-05-08 NOTE — PROGRESS NOTES
INFECTIOUS DISEASE Progress note    David Barfield  1950  [id number]    Date of consult: 5/6/24    Admit date: 5/5/2024    Requesting Provider: Abhishek Alan APRN   Evaluating physician: Derian Barry MD  Reason for Consultation: Recent treatment  Chief Complaint: Vague abdominal pain      Subjective   History of present illness:  David Barfield is a  74 y.o.  Yr old male with PMH NSCLC/RLL lobectomy (1/2024) recently on Opdivo (last dose on 4/4/24), HTN, and emphysema who I have followed during multiple recent admissions after he presented with vague abdominal pain, Nausea, vomiting, Diarrhea, severe leukocytosis with neutrophilia, and recent renal dysfunction.  During his initial admission he was thought to have acute pyelonephritis but urine culture was no growth and blood cultures were no growth.  He clinically responded to cefepime and received a 10 day course of antibiotic coverage and was discharged off of antibiotics after clinical improvement.  His renal function had improved significantly at the time of the discharge.  He subsequently presented back to [facility] last month with similar symptoms and his severe leukocytosis.  C. Difficile test and GI PCR were negative.  CT abdomen and pelvis showed improving stranding around his kidneys consistent with improving pyelonephritis.  Urine culture was negative and blood cultures were again negative. Karius test was negative (Although was antibiotic modified). TTE did not show vegetation.  He clinically improved on cefepime. He was subsequently discharged on Levaquin with plans for a one-week supply and was supposed to follow-up in my clinic today with repeat blood work.     The patient presented back to the hospital last night feeling extremely weak with a near syncopal episode, chills, back pain, nausea and vomiting, and one episode of diarrhea. The patient was found to be hypotensive and has been admitted to the ICU and is on pressors.  Lactic acid was elevated at 3.8 on arrival.  Pro-calcitonin was 1.89.  White blood cell count was elevated back to 33.46.  Creatinine was 2.8, up from 2.03 when he was discharged on 4/29. Respiratory PCR panel was negative.  Cortisol level was 26.23.  Urine Legionella and urine strep pneumo antigens were negative.  MRSA PCR nares is negative.  Blood cultures are in progress. CT chest shows stable small loculated right-sided pleural effusion with stable postoperative and chronic changes in both lungs.  CT abdomen and pelvis showed markedly bilateral perinephric stranding and fluid without hydronephrosis.  Appearance is nonspecific per radiology but could be related to pyelonephritis. The patient was initially given vancomycin and Zosyn in the ER and then has been switched to cefepime 2 g IV every 24 hours.  He remains in the ICU.  ID has been consultative for recommendations regarding the patient's recurrent sepsis and pyelonephritis.    Subjective:    5/7/24: The patient is feeling somewhat better.  Nausea and vomiting has improved.  Creatinine is worse at 5.11.  GI PCR panel was negative.  White blood cell count is slightly better at 28.99.  No severe diarrhea.  Having vague abdominal pain but improved.  No new rashes reported.  No chest pain or shortness of breath.  No fevers.    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    Coronary artery disease 2019    Diabetes mellitus Borderline    Emphysema/COPD     Erectile disorder     GERD (gastroesophageal reflux disease)     History of chemotherapy     Hyperlipidemia     Hypertension 2019    Lung nodule     Mumps     Mumps     Pruritus     after bath    Slow to wake up after anesthesia     Wears dentures     upper only    Wears hearing aid in both ears     usually only wears right       Past Surgical History:   Procedure Laterality Date    BONE BIOPSY       "broken bone surgery in his face    BRONCHOSCOPY THORACOTOMY Right 01/09/2024    Procedure: THORACOTOMY FOR LOWER LOBECTOMY AND MEDISTINAL LYMPH NODE DISSECTION RIGHT;  Surgeon: Joey Patel MD;  Location: [facility] OR;  Service: Cardiothoracic;  Laterality: Right;    BRONCHOSCOPY WITH ION ROBOTIC ASSIST N/A 09/15/2023    Procedure: BRONCHOSCOPY NAVIGATION WITH ENDOBRONCHIAL ULTRASOUND AND ION ROBOT;  Surgeon: Octaviano Sampson MD;  Location: [facility] ENDOSCOPY;  Service: Robotics - Pulmonary;  Laterality: N/A;  ion #[id number] - [id number]  - 0015  Cath guide 0061    EBUS balloon removed and intact    CARDIAC ELECTROPHYSIOLOGY PROCEDURE N/A 08/17/2021    Procedure: Pacemaker DC new;  Surgeon: Kayy Box MD;  Location: [facility] CATH INVASIVE LOCATION;  Service: Cardiology;  Laterality: N/A;    FACIAL FRACTURE SURGERY      LYMPH NODE BIOPSY  2023    PACEMAKER IMPLANTATION         Pediatric History   Patient Parents    Not on file     Other Topics Concern    Not on file   Social History Narrative    Lives in [place], [place]       family history includes Aneurysm in his mother; Cancer in his sister; Dementia in his father; Heart disease in his paternal grandmother; Hypertension in his paternal grandfather; Leukemia in his sister.    Allergies   Allergen Reactions    Cymbalta [Duloxetine Hcl] GI Intolerance    Gabapentin Mental Status Change     Pt states that this medication \"makes him feel foolish in his head\".     Remeron [Mirtazapine] Other (See Comments)     Excess sedation    Toradol [Ketorolac Tromethamine] GI Intolerance     Projectile vomiting     Latex Other (See Comments)     Latex allergy     Tape Rash       Immunization History   Administered Date(s) Administered    ABRYSVO (RSV, 60+ or pregnant women 32-36 wks) 10/16/2023    COVID-19 (PFIZER) BIVALENT 12+YRS 09/16/2022    COVID-19 (PFIZER) Purple Cap Monovalent 01/29/2021, 02/19/2021, 10/18/2021, 04/14/2022    COVID-19 F23 (PFIZER) 12YRS+ (COMIRNATY) " 09/26/2023    Fluzone High-Dose 65+yrs 10/06/2023    Pneumococcal Conjugate 20-Valent (PCV20) 10/11/2023       Medication:    Current Facility-Administered Medications:     acetaminophen (TYLENOL) tablet 650 mg, 650 mg, Oral, Q4H PRN, 650 mg at 05/07/24 1138 **OR** acetaminophen (TYLENOL) suppository 650 mg, 650 mg, Rectal, Q4H PRN, Soledad Colmenares MD    albuterol (PROVENTIL) nebulizer solution 0.083% 2.5 mg/3mL, 2.5 mg, Nebulization, Q6H PRN, Soledad Colmenares MD    sennosides-docusate (PERICOLACE) 8.6-50 MG per tablet 2 tablet, 2 tablet, Oral, BID, 2 tablet at 05/07/24 0840 **AND** polyethylene glycol (MIRALAX) packet 17 g, 17 g, Oral, Daily PRN **AND** bisacodyl (DULCOLAX) EC tablet 5 mg, 5 mg, Oral, Daily PRN **AND** bisacodyl (DULCOLAX) suppository 10 mg, 10 mg, Rectal, Daily PRN, Soledad Colmenares MD    budesonide-formoterol (SYMBICORT) 160-4.5 MCG/ACT inhaler 2 puff, 2 puff, Inhalation, BID - RT, 2 puff at 05/07/24 0742 **AND** tiotropium (SPIRIVA RESPIMAT) 2.5 mcg/act aerosol solution inhaler, 2 puff, Inhalation, Daily - RT, Soledad Colmenares MD, 2 puff at 05/07/24 0742    cefepime 2000 mg IVPB in 100 mL NS (MBP), 2,000 mg, Intravenous, Q24H, Soledad Colmenares MD, 2,000 mg at 05/07/24 0840    dextrose (D50W) (25 g/50 mL) IV injection 25 g, 25 g, Intravenous, Q15 Min PRN, Soledad Colmenares MD    dextrose (GLUTOSE) oral gel 15 g, 15 g, Oral, Q15 Min PRN, Soledad Colmenares MD    famotidine (PEPCID) tablet 20 mg, 20 mg, Oral, Daily, Soledad Colmenares MD, 20 mg at 05/07/24 0840    glucagon (GLUCAGEN) injection 1 mg, 1 mg, Intramuscular, Q15 Min PRN, Soledad Colmenares MD    heparin (porcine) 5000 UNIT/ML injection 5,000 Units, 5,000 Units, Subcutaneous, Q12H, Soledad Colmenares MD, 5,000 Units at 05/07/24 0839    HYDROcodone-acetaminophen (NORCO) 7.5-325 MG per tablet 1 tablet, 1 tablet, Oral, Q6H PRN, Soledad Colmenares MD    Insulin Lispro (humaLOG)  "injection 2-7 Units, 2-7 Units, Subcutaneous, 4x Daily AC & at Bedtime, Soledad Colmenares MD    midodrine (PROAMATINE) tablet 5 mg, 5 mg, Oral, Q8H, Kam Gomez MD    nitroglycerin (NITROSTAT) SL tablet 0.4 mg, 0.4 mg, Sublingual, Q5 Min PRN, Soledad Colmenares MD    sodium chloride 0.9 % flush 10 mL, 10 mL, Intravenous, PRN, Soledad Colmenares MD    sodium chloride 0.9 % flush 10 mL, 10 mL, Intravenous, Q12H, Soledad Colmenares MD, 10 mL at 05/06/24 2012    sodium chloride 0.9 % infusion 40 mL, 40 mL, Intravenous, PRN, Soledad Colmenares MD    Please refer to the medical record for a full medication list    Review of Systems:    As above    Physical Exam:   Vital Signs   Temp:  [97.3 °F (36.3 °C)-98.4 °F (36.9 °C)] 97.4 °F (36.3 °C)  Heart Rate:  [70-96] 96  Resp:  [16-18] 18  BP: ()/(41-90) 147/79    Temp  Min: 97.3 °F (36.3 °C)  Max: 98.4 °F (36.9 °C)  BP  Min: 94/41  Max: 170/90  Pulse  Min: 70  Max: 96  Resp  Min: 16  Max: 18  SpO2  Min: 92 %  Max: 100 %    Blood pressure 147/79, pulse 96, temperature 97.4 °F (36.3 °C), temperature source Oral, resp. rate 18, height 182.9 cm (72\"), weight 78.7 kg (173 lb 9.6 oz), SpO2 100%.  GENERAL: Awake and alert, in no acute distress. Appears stated age. Slightly less frail appearing  HEENT:  Normocephalic, atraumatic.  No external oral lesions noted  EYES: PERRL. No conjunctival injection. No icterus.   HEART: RRR, no murmur  LUNGS: CTA B.  Nonlabored breathing on room air.  ABDOMEN: Soft, nontender, nondistended. No appreciable HSM.    GENITAL: no Yeung catheter  SKIN: No new diffuse rashes  PSYCHIATRIC: cooperative.  Appropriate mood and affect  EXT:  No cellulitic change.  NEURO: awake alert and oriented ×4.  Normal speech and cognition    AICD pocket site is without erythema or tenderness    Mediport site is without erythema     Results Review:   I reviewed the patient's new clinical results.  I reviewed the patient's new imaging " "results and agree with the interpretation.  I reviewed the patient's other test results and agree with the interpretation    Results from last 7 days   Lab Units 05/07/24  0502 05/06/24  0035 05/05/24  2352 05/03/24  1632   WBC 10*3/mm3 28.99*  --  33.46* 12.84*   HEMOGLOBIN g/dL 9.4*  --  12.0* 11.1*   HEMOGLOBIN, POC g/dL  --  13.6  --   --    HEMATOCRIT % 29.0*  --  38.9 34.7*   HEMATOCRIT POC %  --  40  --   --    PLATELETS 10*3/mm3 175  --  245 326     Results from last 7 days   Lab Units 05/07/24  0502   SODIUM mmol/L 131*   POTASSIUM mmol/L 4.5   CHLORIDE mmol/L 99   CO2 mmol/L 17.0*   BUN mg/dL 36*   CREATININE mg/dL 5.11*   GLUCOSE mg/dL 93   CALCIUM mg/dL 8.2*     Results from last 7 days   Lab Units 05/07/24  0502   ALK PHOS U/L 72   BILIRUBIN mg/dL 0.3   ALT (SGPT) U/L 13   AST (SGOT) U/L 18                 Results from last 7 days   Lab Units 05/06/24  1152   LACTATE mmol/L 1.7     Estimated Creatinine Clearance: 13.9 mL/min (A) (by C-G formula based on SCr of 5.11 mg/dL (H)).  CPK          5/6/2024    02:07   Common Labs   Creatine Kinase 12       Procalitonin Results:      Lab 05/05/24  2352   PROCALCITONIN 1.89*      Brief Urine Lab Results  (Last result in the past 365 days)        Color   Clarity   Blood   Leuk Est   Nitrite   Protein   CREAT   Urine HCG        05/06/24 1206             60.0         05/06/24 1206 Yellow   Cloudy   Trace   Small (1+)   Negative   >=300 mg/dL (3+)                  No results found for: \"SITE\", \"ALLENTEST\", \"PHART\", \"GVW2FRT\", \"PO2ART\", \"ERE7GQL\", \"BASEEXCESS\", \"Y7IGHADP\", \"HGBBG\", \"HCTABG\", \"OXYHEMOGLOBI\", \"METHHGBN\", \"CARBOXYHGB\", \"CO2CT\", \"BAROMETRIC\", \"MODALITY\", \"FIO2\"     Microbiology:  5/6 blood culture ×2: In progress    Radiology:  Imaging Results (Last 72 Hours)       Procedure Component Value Units Date/Time    CT Abdomen Pelvis Without Contrast [[id number]] Collected: 05/06/24 0050     Updated: 05/06/24 0057    Narrative:      CT ABDOMEN PELVIS WO " CONTRAST    Date of Exam: 5/6/2024 12:29 AM EDT    Indication: diarrhea, vomiting, hypotension.    Comparison: None available.    Technique: Axial CT images were obtained of the abdomen and pelvis without the administration of contrast. Reconstructed coronal and sagittal images were also obtained. Automated exposure control and iterative construction methods were used.      Findings:  Findings in the chest discussed in a separate report. No acute findings in the superficial soft tissues. No acute osseous abnormalities or destructive bone lesions. There is mild thoracolumbar spondylosis. There are minor osteophytes of both hips.    The liver, gallbladder, bile ducts, pancreas, mid and distal stomach, duodenum and spleen appear unremarkable. Adrenal glands appear normal. There is marked bilateral perinephric stranding and fluid without organization. This appears confined to Gerota's   fascia. Kidney parenchyma appears homogeneous. No hydronephrosis. No urolithiasis. Mild urinary bladder wall thickening appears unchanged. No pericystic inflammation. There is mild colonic diverticulosis and mild colonic fecal retention. No small bowel   distention. There is moderate aortoiliac atherosclerosis without evidence of aneurysm. No ascites, pneumoperitoneum or lymphadenopathy.      Impression:      Impression:  Marked bilateral perinephric stranding and fluid without hydronephrosis. Appearance is nonspecific, but could be related to pyelonephritis. Correlate with urinalysis.            Electronically Signed: Raulito Crenshaw MD    5/6/2024 12:54 AM EDT    Workstation ID: [id number]    CT Chest Without Contrast Diagnostic [[id number]] Collected: 05/06/24 0044     Updated: 05/06/24 0053    Narrative:      CT CHEST WO CONTRAST DIAGNOSTIC    Date of Exam: 5/6/2024 12:29 AM EDT    Indication: hypotension, chills, vomiting.    Comparison: Chest radiograph 5/5/2024 and chest CT 4/24/2024.    Technique: Axial CT images were obtained of the  chest without contrast administration.  Reconstructed coronal and sagittal images were also obtained. Automated exposure control and iterative construction methods were used.      Findings:  There is a stable small loculated right-sided pleural effusion. There is stable scarring in the lung apices along with paraseptal emphysema. Stable subpleural interstitial disease in both lungs, right greater than left favored to be chronic. There is a   small calcified cluster of granulomas in the left upper lobe. There appear to be changes of right lower lobectomy. There is no pneumothorax. No left-sided pleural effusion. No new or enlarging lung nodules.    The thyroid, trachea and esophagus appear within normal limits. There is a small hiatal hernia. Heart size is normal. There are severe coronary artery calcifications. ICD leads noted in the right heart. Mild aortic atherosclerosis. No aneurysm. No   mediastinal lymphadenopathy or pericardial effusion.    There is a stable left-sided chest port. Stable right-sided ICD. No acute findings in the superficial soft tissues. There is new marked bilateral perinephric stranding and trace unorganized fluid. The kidneys are only partially included on this study. No   additional findings in the limited images of the upper abdomen. No acute osseous abnormality or destructive bone lesion. There are moderate lower cervical and mild diffuse thoracic degenerative changes. There are healing minimally displaced fractures of   the right lateral fifth and sixth ribs, which appears not significantly changed from the prior study. No new rib fractures.      Impression:      Impression:    1. Stable small loculated right-sided pleural effusion with stable postoperative and chronic changes in both lungs.  2. Severe coronary artery disease.  3. Grossly abnormal appearance of partially visualized kidneys in the upper retroperitoneum. Appearance could be due to infection, inflammation or obstruction.  Recommend dedicated imaging of the abdomen and pelvis ideally with IV contrast.  4. Small hiatal hernia.  5. Stable appearance of healing right lateral fifth and sixth rib fractures.          Electronically Signed: Raulito Crenshaw MD    5/6/2024 12:49 AM EDT    Workstation ID: [id number]    XR Chest 1 View [[id number]] Collected: 05/06/24 0020     Updated: 05/06/24 0024    Narrative:      XR CHEST 1 VW    Date of Exam: 5/5/2024 11:47 PM EDT    Indication: hypotension    Comparison: 4/24/2024 and chest CT same date.    Findings:  Stable volume loss in the right lung. Stable small right-sided pleural effusion and right basilar scarring. Left lung remains clear. Stable diffuse interstitial prominence in the lungs. No pneumothorax or new lung opacity. Stable right-sided multi lead   ICD. Heart size is unchanged. Pulmonary vasculature is within normal limits. No acute osseous abnormalities. Stable left-sided chest port.      Impression:      Impression:  Stable chronic and postoperative changes and small right-sided pleural effusion.      Electronically Signed: Raulito Crenshaw MD    5/6/2024 12:21 AM EDT    Workstation ID: [id number]            IMPRESSION:     Problems:  Recurrent sepsis, now presenting with septic shock with severe leukocytosis with neutrophilia, elevated procalcitonin level, lactic acidosis, hypotension with pressor requirement. Could be due to pyelonephritis that is noted on imaging but no positive culture data today.  Multiple urine cultures have been sent although may have been antibiotic modified.  Blood cultures have been no growth. Karius test from last admission was negative although was modified by antibiotics. He is seemingly responded well to cefepime.  He had recurrence on oral Levaquin.  May need to give him a longer course of empiric cefepime at time of discharge. Seems to be improving again on cefepime. He has a very unusual case but this does seem to be an infectious etiology/bacterial  infection that is responding to cefepime but he quickly declined after stopping cefepime each time even after his long is a 10 day course.  Bilateral pyelonephritis-present on imaging and some symptoms consistent with pyelonephritis but no culprit identified on culture.  No hydronephrosis or renal stone.  Unusual presentation in immunocompromised host.  Acute renal failure-Worse.  Creatinine increased to 5.11.  He has very little urine output  Nausea and vomiting-Improved  Diarrhea-Resolved.  Per his girlfriend who is at bedside she states that he had more of incontinence prior to arrival due to altered mental status and was not severe watery diarrhea.  Stable small loculated right-sided pleural effusion-likely postoperative after lobectomy.  No shortness of breath.  I discussed this with Dr. Colmenares and she does not think that this is the source for his infection. I agree with this.  NSCLC/RLL lobectomy in January 2024, recently on Opdivo (last dose on 4/4/24)  Emphysema      RECOMMENDATIONS:    -Closely follow CBC and CMP  -Follow pending blood cultures-No growth to date  -Urine culture-no growth final  -Consider DAMIÁN this admission given the presence of his AICD and unclear etiology of recurrent sepsis. Could get cardiology opinion regarding this  -GIP PCR panel was negative  -Agree with cefepime 2 g IV every 24 hours. May need to give a longer course of IV antibiotic therapy.  Could consider a four-week course empirically if no new source identified  -Agree with ICU management.     I discussed in length of the patient and his girlfriend at bedside today    I discussed the patient's case in length with Dr. Colmenares today    I discussed the patient's case in length Dr. Ashley today    Thank you for asking me to see David Barfield.  Our group would be pleased to follow this patient over the course of their hospitalization and assist with outpatient antimicrobial therapy, as indicated.  Further  recommendations depend on the results of the cultures and clinical course.    Complex MDM. 35 minutes of critical care time spent in the patient's case today    Derian Barry MD  5/7/2024

## 2024-05-08 NOTE — PLAN OF CARE
Goal Outcome Evaluation:     Problem: Adult Inpatient Plan of Care  Goal: Plan of Care Review  Outcome: Ongoing, Progressing  Goal: Patient-Specific Goal (Individualized)  Outcome: Ongoing, Progressing  Goal: Absence of Hospital-Acquired Illness or Injury  Outcome: Ongoing, Progressing  Goal: Optimal Comfort and Wellbeing  Outcome: Ongoing, Progressing  Goal: Readiness for Transition of Care  Outcome: Ongoing, Progressing     Problem: Adjustment to Illness (Sepsis/Septic Shock)  Goal: Optimal Coping  Outcome: Ongoing, Progressing     Problem: Bleeding (Sepsis/Septic Shock)  Goal: Absence of Bleeding  Outcome: Ongoing, Progressing

## 2024-05-08 NOTE — PLAN OF CARE
Goal Outcome Evaluation:  Plan of Care Reviewed With: patient, significant other        Progress: no change  Outcome Evaluation: Pt presents with baseline functional mobility. Pt ambualted 325ft independently and navigated 10 steps with step over step pattern. No further skilled IP PT interventions warranted, will sign off. Recommend D/C home with assist.      Anticipated Discharge Disposition (PT): home with assist

## 2024-05-08 NOTE — THERAPY DISCHARGE NOTE
Patient Name: David Barfield  : 1950    MRN: [id number]                              Today's Date: 2024       Admit Date: 2024    Visit Dx:     ICD-10-CM ICD-9-CM   1. Hypotension, unspecified hypotension type  I95.9 458.9   2. Nausea and vomiting, unspecified vomiting type  R11.2 787.01   3. Diarrhea, unspecified type  R19.7 787.91   4. Chills (without fever)  R68.83 780.64     Patient Active Problem List   Diagnosis    High degree atrioventricular block    Bradycardia, sinus    Chronic hypotension    PVC's (premature ventricular contractions)    Presence of cardiac pacemaker    Mixed hyperlipidemia    Centrilobular emphysema    Nodule of lower lobe of right lung    Mediastinal adenopathy    Cough    Tobacco abuse    Bronchogenic cancer of right lung    Malignant neoplasm of lower lobe of right lung    Primary hypertension    GERD without esophagitis    Septic shock    Acute pyelonephritis    ARACELI (acute kidney injury)    Hypokalemia    Severe malnutrition    CAD (coronary artery disease)    Pyelonephritis     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    Coronary artery disease 2019    Diabetes mellitus Borderline    Emphysema/COPD     Erectile disorder     GERD (gastroesophageal reflux disease)     History of chemotherapy     Hyperlipidemia     Hypertension 2019    Lung nodule     Mumps     Mumps     Pruritus     after bath    Slow to wake up after anesthesia     Wears dentures     upper only    Wears hearing aid in both ears     usually only wears right     Past Surgical History:   Procedure Laterality Date    BONE BIOPSY      broken bone surgery in his face    BRONCHOSCOPY THORACOTOMY Right 2024    Procedure: THORACOTOMY FOR LOWER LOBECTOMY AND MEDISTINAL LYMPH NODE DISSECTION RIGHT;  Surgeon: Joey Patel MD;  Location: [facility];  Service: Cardiothoracic;   Laterality: Right;    BRONCHOSCOPY WITH ION ROBOTIC ASSIST N/A 09/15/2023    Procedure: BRONCHOSCOPY NAVIGATION WITH ENDOBRONCHIAL ULTRASOUND AND ION ROBOT;  Surgeon: Octaviano Sampson MD;  Location:  [facility];  Service: Robotics - Pulmonary;  Laterality: N/A;  ion #6 - 0032  - 0015  Cath guide 0061    EBUS balloon removed and intact    CARDIAC ELECTROPHYSIOLOGY PROCEDURE N/A 08/17/2021    Procedure: Pacemaker DC new;  Surgeon: Kayy Box MD;  Location:  [facility];  Service: Cardiology;  Laterality: N/A;    FACIAL FRACTURE SURGERY      LYMPH NODE BIOPSY  2023    PACEMAKER IMPLANTATION        General Information       Row Name 05/08/24 1558          Physical Therapy Time and Intention    Document Type discharge evaluation/summary  -AE     Mode of Treatment physical therapy  -AE       Row Name 05/08/24 1555          General Information    Patient Profile Reviewed yes  -AE     Prior Level of Function independent:;all household mobility;gait;transfer;bed mobility;ADL's;dressing;bathing  -AE     Existing Precautions/Restrictions no known precautions/restrictions  -AE     Barriers to Rehab none identified  -AE       Row Name 05/08/24 1558          Living Environment    People in Home significant other  -AE       Row Name 05/08/24 1558          Home Main Entrance    Number of Stairs, Main Entrance one  -AE     Stair Railings, Main Entrance none  -AE       Row Name 05/08/24 1558          Stairs Within Home, Primary    Stairs, Within Home, Primary 17 steps  -AE     Number of Stairs, Within Home, Primary other (see comments)  -AE     Stair Railings, Within Home, Primary railing on right side (ascending)  -AE       Row Name 05/08/24 1554          Cognition    Orientation Status (Cognition) oriented x 4  -AE       Row Name 05/08/24 1554          Safety Issues, Functional Mobility    Safety Issues Affecting Function (Mobility) safety precaution awareness  -AE     Impairments Affecting  Function (Mobility) endurance/activity tolerance  -AE               User Key  (r) = Recorded By, (t) = Taken By, (c) = Cosigned By      Initials Name Provider Type    AE Jeff Carbone, PT Physical Therapist                   Mobility       Row Name 05/08/24 1607          Bed Mobility    Comment, (Bed Mobility) No cues required for bed mobility.  -AE       Row Name 05/08/24 1607          Transfers    Comment, (Transfers) Good sequencing. No LOB with activity.  -AE       Row Name 05/08/24 1607          Sit-Stand Transfer    Sit-Stand [place] (Transfers) independent  -AE       Row Name 05/08/24 1607          Gait/Stairs (Locomotion)    [place] Level (Gait) independent  -AE     Distance in Feet (Gait) 325  -AE     [place] Level (Stairs) independent  -AE     Number of Steps (Stairs) 10  -AE     Ascending Technique (Stairs) step-over-step  -AE     Descending Technique (Stairs) step-over-step  -AE     Comment, (Gait/Stairs) No cues required for ambulation. Pt demo good effort with mobility, no LOB noted throughout session.  -AE               User Key  (r) = Recorded By, (t) = Taken By, (c) = Cosigned By      Initials Name Provider Type    AE Jeff Carbone, PT Physical Therapist                   Obj/Interventions       Row Name 05/08/24 1609          Range of Motion Comprehensive    General Range of Motion bilateral lower extremity ROM WFL  -AE       Row Name 05/08/24 1609          Strength Comprehensive (MMT)    General Manual Muscle Testing (MMT) Assessment no strength deficits identified  -AE       Row Name 05/08/24 1609          Balance    Balance Assessment sitting static balance;sitting dynamic balance;sit to stand dynamic balance;standing static balance;standing dynamic balance  -AE     Static Sitting Balance independent  -AE     Dynamic Sitting Balance independent  -AE     Position, Sitting Balance unsupported  -AE     Sit to Stand Dynamic Balance independent  -AE     Static Standing Balance  independent  -AE     Dynamic Standing Balance independent  -AE     Position/Device Used, Standing Balance unsupported  -AE       Row Name 05/08/24 1609          Sensory Assessment (Somatosensory)    Sensory Assessment (Somatosensory) LE sensation intact  -AE               User Key  (r) = Recorded By, (t) = Taken By, (c) = Cosigned By      Initials Name Provider Type    AE Jeff Carbone, PT Physical Therapist                   Goals/Plan    No documentation.                  Clinical Impression       Row Name 05/08/24 1611          Pain    Pretreatment Pain Rating 0/10 - no pain  -AE     Posttreatment Pain Rating 0/10 - no pain  -AE       Row Name 05/08/24 1611          Plan of Care Review    Plan of Care Reviewed With patient;significant other  -AE     Progress no change  -AE     Outcome Evaluation Pt presents with baseline functional mobility. Pt ambualted 325ft independently and navigated 10 steps with step over step pattern. No further skilled IP PT interventions warranted, will sign off. Recommend D/C home with assist.  -AE       Row Name 05/08/24 1611          Therapy Assessment/Plan (PT)    Criteria for Skilled Interventions Met (PT) no;no problems identified which require skilled intervention  -AE     Therapy Frequency (PT) evaluation only  -AE       Row Name 05/08/24 1611          Vital Signs    Pre Systolic BP Rehab 159  -AE     Pre Treatment Diastolic BP 83  -AE     Pretreatment Heart Rate (beats/min) 71  -AE     Posttreatment Heart Rate (beats/min) 81  -AE     O2 Delivery Pre Treatment room air  -AE     O2 Delivery Intra Treatment room air  -AE     Post SpO2 (%) 98  -AE     O2 Delivery Post Treatment room air  -AE     Pre Patient Position Sitting  -AE     Intra Patient Position Standing  -AE     Post Patient Position Supine  -AE       Row Name 05/08/24 1611          Positioning and Restraints    Pre-Treatment Position sitting in chair/recliner  -AE     Post Treatment Position bed  -AE     In Bed  notified nsg;sitting;call light within reach;encouraged to call for assist;with family/caregiver  -AE               User Key  (r) = Recorded By, (t) = Taken By, (c) = Cosigned By      Initials Name Provider Type    Jeff Cavanaugh, DENEEN Physical Therapist                   Outcome Measures       Row Name 05/08/24 1613 05/08/24 0800       How much help from another person do you currently need...    Turning from your back to your side while in flat bed without using bedrails? 4  -AE 4  -AH    Moving from lying on back to sitting on the side of a flat bed without bedrails? 4  -AE 4  -AH    Moving to and from a bed to a chair (including a wheelchair)? 4  -AE 3  -AH    Standing up from a chair using your arms (e.g., wheelchair, bedside chair)? 4  -AE 3  -AH    Climbing 3-5 steps with a railing? 4  -AE 3  -AH    To walk in hospital room? 4  -AE 3  -AH    AM-PAC 6 Clicks Score (PT) 24  -AE 20  -AH    Highest Level of Mobility Goal 8 --> Walked 250 feet or more  -AE 6 --> Walk 10 steps or more  -AH      Row Name 05/08/24 1613          Functional Assessment    Outcome Measure Options AM-PAC 6 Clicks Basic Mobility (PT)  -AE               User Key  (r) = Recorded By, (t) = Taken By, (c) = Cosigned By      Initials Name Provider Type    Marie Womack, RN Registered Nurse    Jeff Cavanaugh, PT Physical Therapist                                 Physical Therapy Education       Title: PT OT SLP Therapies (In Progress)       Topic: Physical Therapy (In Progress)       Point: Mobility training (Done)       Learning Progress Summary             Patient Acceptance, E, VU by AE at 5/8/2024 1540                         Point: Home exercise program (Not Started)       Learner Progress:  Not documented in this visit.              Point: Body mechanics (Done)       Learning Progress Summary             Patient Acceptance, E, VU by AE at 5/8/2024 1540                         Point: Precautions (Done)       Learning Progress  Summary             Patient Acceptance, E, VU by AE at 5/8/2024 1540                                         User Key       Initials Effective Dates Name Provider Type Discipline    AE 09/21/21 -  Jeff Carbone, PT Physical Therapist PT                  PT Recommendation and Plan     Plan of Care Reviewed With: patient, significant other  Progress: no change  Outcome Evaluation: Pt presents with baseline functional mobility. Pt ambualted 325ft independently and navigated 10 steps with step over step pattern. No further skilled IP PT interventions warranted, will sign off. Recommend D/C home with assist.     Time Calculation:   PT Evaluation Complexity  History, PT Evaluation Complexity: 1-2 personal factors and/or comorbidities  Examination of Body Systems (PT Eval Complexity): total of 3 or more elements  Clinical Presentation (PT Evaluation Complexity): stable  Clinical Decision Making (PT Evaluation Complexity): low complexity  Overall Complexity (PT Evaluation Complexity): low complexity     PT Charges       Row Name 05/08/24 1614             Time Calculation    Start Time 1540  -AE      PT Received On 05/08/24  -AE         Untimed Charges    PT Eval/Re-eval Minutes 36  -AE         Total Minutes    Untimed Charges Total Minutes 36  -AE       Total Minutes 36  -AE                User Key  (r) = Recorded By, (t) = Taken By, (c) = Cosigned By      Initials Name Provider Type    AE Jeff Carbone, PT Physical Therapist                  Therapy Charges for Today       Code Description Service Date Service Provider Modifiers Qty    [id number]  PT EVAL LOW COMPLEXITY 3 5/8/2024 Jeff Carbone PT GP 1            PT G-Codes  Outcome Measure Options: AM-PAC 6 Clicks Basic Mobility (PT)  AM-PAC 6 Clicks Score (PT): 24  PT Discharge Summary  Anticipated Discharge Disposition (PT): home with assist    Jeff Carbone PT  5/8/2024

## 2024-05-08 NOTE — PROGRESS NOTES
INFECTIOUS DISEASE Progress note    David Barfield  1950  [id number]    Date of consult: 5/6/24    Admit date: 5/5/2024    Requesting Provider: Abhishek Alan APRN   Evaluating physician: Derian Barry MD  Reason for Consultation: Recent treatment  Chief Complaint: Vague abdominal pain      Subjective   History of present illness:  David Barfield is a  74 y.o.  Yr old male with PMH NSCLC/RLL lobectomy (1/2024) recently on Opdivo (last dose on 4/4/24), HTN, and emphysema who I have followed during multiple recent admissions after he presented with vague abdominal pain, Nausea, vomiting, Diarrhea, severe leukocytosis with neutrophilia, and recent renal dysfunction.  During his initial admission he was thought to have acute pyelonephritis but urine culture was no growth and blood cultures were no growth.  He clinically responded to cefepime and received a 10 day course of antibiotic coverage and was discharged off of antibiotics after clinical improvement.  His renal function had improved significantly at the time of the discharge.  He subsequently presented back to [facility] last month with similar symptoms and his severe leukocytosis.  C. Difficile test and GI PCR were negative.  CT abdomen and pelvis showed improving stranding around his kidneys consistent with improving pyelonephritis.  Urine culture was negative and blood cultures were again negative. Karius test was negative (Although was antibiotic modified). TTE did not show vegetation.  He clinically improved on cefepime. He was subsequently discharged on Levaquin with plans for a one-week supply and was supposed to follow-up in my clinic today with repeat blood work.     The patient presented back to the hospital last night feeling extremely weak with a near syncopal episode, chills, back pain, nausea and vomiting, and one episode of diarrhea. The patient was found to be hypotensive and has been admitted to the ICU and is on pressors.  Lactic acid was elevated at 3.8 on arrival.  Pro-calcitonin was 1.89.  White blood cell count was elevated back to 33.46.  Creatinine was 2.8, up from 2.03 when he was discharged on 4/29. Respiratory PCR panel was negative.  Cortisol level was 26.23.  Urine Legionella and urine strep pneumo antigens were negative.  MRSA PCR nares is negative.  Blood cultures are in progress. CT chest shows stable small loculated right-sided pleural effusion with stable postoperative and chronic changes in both lungs.  CT abdomen and pelvis showed markedly bilateral perinephric stranding and fluid without hydronephrosis.  Appearance is nonspecific per radiology but could be related to pyelonephritis. The patient was initially given vancomycin and Zosyn in the ER and then has been switched to cefepime 2 g IV every 24 hours.  He remains in the ICU.  ID has been consultative for recommendations regarding the patient's recurrent sepsis and pyelonephritis.    Subjective:    5/7/24: The patient is feeling somewhat better.  Nausea and vomiting has improved.  Creatinine is worse at 5.11.  GI PCR panel was negative.  White blood cell count is slightly better at 28.99.  No severe diarrhea.  Having vague abdominal pain but improved.  No new rashes reported.  No chest pain or shortness of breath.  No fevers.    5/8/24: The patient continues to feel better.  Urine output is improving.  He remains afebrile.  Having some slight nausea but no emesis.  Not eating much.  No severe diarrhea. White blood cell count trended down to 16.54.  Creatinine has increased to 6.37.    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    Coronary artery disease 2019    Diabetes mellitus Borderline    Emphysema/COPD     Erectile disorder     GERD (gastroesophageal reflux disease)     History of chemotherapy     Hyperlipidemia     Hypertension 2019    Lung  "nodule     Mumps     Mumps     Pruritus     after bath    Slow to wake up after anesthesia     Wears dentures     upper only    Wears hearing aid in both ears     usually only wears right       Past Surgical History:   Procedure Laterality Date    BONE BIOPSY      broken bone surgery in his face    BRONCHOSCOPY THORACOTOMY Right 01/09/2024    Procedure: THORACOTOMY FOR LOWER LOBECTOMY AND MEDISTINAL LYMPH NODE DISSECTION RIGHT;  Surgeon: Joey Patel MD;  Location:  [facility];  Service: Cardiothoracic;  Laterality: Right;    BRONCHOSCOPY WITH ION ROBOTIC ASSIST N/A 09/15/2023    Procedure: BRONCHOSCOPY NAVIGATION WITH ENDOBRONCHIAL ULTRASOUND AND ION ROBOT;  Surgeon: Octaviano Sampson MD;  Location:  [facility];  Service: Robotics - Pulmonary;  Laterality: N/A;  ion #6 - 0032  - 0015  Cath guide 0061    EBUS balloon removed and intact    CARDIAC ELECTROPHYSIOLOGY PROCEDURE N/A 08/17/2021    Procedure: Pacemaker DC new;  Surgeon: aKyy Box MD;  Location:  [facility];  Service: Cardiology;  Laterality: N/A;    FACIAL FRACTURE SURGERY      LYMPH NODE BIOPSY  2023    PACEMAKER IMPLANTATION         Pediatric History   Patient Parents    Not on file     Other Topics Concern    Not on file   Social History Narrative    Lives in [place], [place]       family history includes Aneurysm in his mother; Cancer in his sister; Dementia in his father; Heart disease in his paternal grandmother; Hypertension in his paternal grandfather; Leukemia in his sister.    Allergies   Allergen Reactions    Cymbalta [Duloxetine Hcl] GI Intolerance    Gabapentin Mental Status Change     Pt states that this medication \"makes him feel foolish in his head\".     Remeron [Mirtazapine] Other (See Comments)     Excess sedation    Toradol [Ketorolac Tromethamine] GI Intolerance     Projectile vomiting     Latex Other (See Comments)     Latex allergy     Tape Rash       Immunization History   Administered " Date(s) Administered    ABRYSVO (RSV, 60+ or pregnant women 32-36 wks) 10/16/2023    COVID-19 (PFIZER) BIVALENT 12+YRS 09/16/2022    COVID-19 (PFIZER) Purple Cap Monovalent 01/29/2021, 02/19/2021, 10/18/2021, 04/14/2022    COVID-19 F23 (PFIZER) 12YRS+ (COMIRNATY) 09/26/2023    Fluzone High-Dose 65+yrs 10/06/2023    Pneumococcal Conjugate 20-Valent (PCV20) 10/11/2023       Medication:    Current Facility-Administered Medications:     acetaminophen (TYLENOL) tablet 650 mg, 650 mg, Oral, Q4H PRN, 650 mg at 05/07/24 1138 **OR** acetaminophen (TYLENOL) suppository 650 mg, 650 mg, Rectal, Q4H PRN, Soledad Colmenares MD    albuterol (PROVENTIL) nebulizer solution 0.083% 2.5 mg/3mL, 2.5 mg, Nebulization, Q6H PRN, Soledad Colmenares MD    sennosides-docusate (PERICOLACE) 8.6-50 MG per tablet 2 tablet, 2 tablet, Oral, BID, 2 tablet at 05/08/24 0931 **AND** polyethylene glycol (MIRALAX) packet 17 g, 17 g, Oral, Daily PRN **AND** bisacodyl (DULCOLAX) EC tablet 5 mg, 5 mg, Oral, Daily PRN **AND** bisacodyl (DULCOLAX) suppository 10 mg, 10 mg, Rectal, Daily PRN, Soledad Colmenares MD    budesonide-formoterol (SYMBICORT) 160-4.5 MCG/ACT inhaler 2 puff, 2 puff, Inhalation, BID - RT, 2 puff at 05/08/24 1038 **AND** tiotropium (SPIRIVA RESPIMAT) 2.5 mcg/act aerosol solution inhaler, 2 puff, Inhalation, Daily - RT, Soledad Colmenares MD, 2 puff at 05/08/24 1038    cefepime 2000 mg IVPB in 100 mL NS (MBP), 2,000 mg, Intravenous, Q24H, Derian Barry MD, 2,000 mg at 05/08/24 0931    famotidine (PEPCID) tablet 20 mg, 20 mg, Oral, Daily, Soledad Colmenares MD, 20 mg at 05/08/24 0931    heparin (porcine) 5000 UNIT/ML injection 5,000 Units, 5,000 Units, Subcutaneous, Q12H, Soledad Colmenares MD, 5,000 Units at 05/07/24 2100    HYDROcodone-acetaminophen (NORCO) 7.5-325 MG per tablet 1 tablet, 1 tablet, Oral, Q6H PRN, Soledad Colmenares MD    midodrine (PROAMATINE) tablet 5 mg, 5 mg, Oral, Q8H PRN, Rosalie,  "MD Kael    nitroglycerin (NITROSTAT) SL tablet 0.4 mg, 0.4 mg, Sublingual, Q5 Min PRN, Soledad Colmenares MD    ondansetron ODT (ZOFRAN-ODT) disintegrating tablet 4 mg, 4 mg, Oral, Q6H PRN, 4 mg at 05/08/24 1333 **OR** ondansetron (ZOFRAN) injection 4 mg, 4 mg, Intravenous, Q6H PRN, Kael Wiggins MD    sodium bicarbonate 8.4 % 150 mEq in dextrose (D5W) 5 % 1,000 mL infusion (greater than 100 mEq), 150 mEq, Intravenous, Continuous, Jose Singh MD    sodium chloride 0.9 % flush 10 mL, 10 mL, Intravenous, PRN, Soledad Colmenares MD    sodium chloride 0.9 % flush 10 mL, 10 mL, Intravenous, Q12H, Soledad Colmenares MD, 10 mL at 05/08/24 0931    sodium chloride 0.9 % infusion 40 mL, 40 mL, Intravenous, PRN, Soledad Colmenares MD    Please refer to the medical record for a full medication list    Review of Systems:    As above    Physical Exam:   Vital Signs   Temp:  [96.6 °F (35.9 °C)-98.7 °F (37.1 °C)] 97 °F (36.1 °C)  Heart Rate:  [70-87] 82  Resp:  [17-18] 17  BP: (119-159)/(67-85) 159/83    Temp  Min: 96.6 °F (35.9 °C)  Max: 98.7 °F (37.1 °C)  BP  Min: 119/72  Max: 159/83  Pulse  Min: 70  Max: 87  Resp  Min: 17  Max: 18  SpO2  Min: 94 %  Max: 98 %    Blood pressure 159/83, pulse 82, temperature 97 °F (36.1 °C), temperature source Axillary, resp. rate 17, height 182.9 cm (72\"), weight 81.7 kg (180 lb 1.6 oz), SpO2 96%.  GENERAL: Awake and alert, in no acute distress. Appears stated age. Sitting up in bed  HEENT:  Normocephalic, atraumatic.  No external oral lesions noted  EYES: Pupils equal and round. No conjunctival injection. No icterus.   HEART: RRR, no murmur  LUNGS: CTA B.  Nonlabored breathing on room air.  ABDOMEN: Soft, nontender, nondistended. No appreciable HSM.    GENITAL: no Yeung catheter  SKIN: No rashes or jaundice  PSYCHIATRIC: cooperative.  Appropriate mood and affect  EXT:  No cellulitic change.  NEURO: awake alert and oriented ×4.  Normal speech and cognition    AICD pocket " "site is without erythema or tenderness    Mediport site is without erythema     Results Review:   I reviewed the patient's new clinical results.  I reviewed the patient's new imaging results and agree with the interpretation.  I reviewed the patient's other test results and agree with the interpretation    Results from last 7 days   Lab Units 05/08/24  0413 05/07/24  0502 05/06/24  0035 05/05/24  2352   WBC 10*3/mm3 16.54* 28.99*  --  33.46*   HEMOGLOBIN g/dL 9.1* 9.4*  --  12.0*   HEMOGLOBIN, POC g/dL  --   --  13.6  --    HEMATOCRIT % 27.9* 29.0*  --  38.9   HEMATOCRIT POC %  --   --  40  --    PLATELETS 10*3/mm3 202 175  --  245     Results from last 7 days   Lab Units 05/08/24  0413   SODIUM mmol/L 128*   POTASSIUM mmol/L 4.5   CHLORIDE mmol/L 97*   CO2 mmol/L 16.0*   BUN mg/dL 47*   CREATININE mg/dL 6.37*   GLUCOSE mg/dL 90   CALCIUM mg/dL 8.4*     Results from last 7 days   Lab Units 05/07/24  0502   ALK PHOS U/L 72   BILIRUBIN mg/dL 0.3   ALT (SGPT) U/L 13   AST (SGOT) U/L 18                 Results from last 7 days   Lab Units 05/06/24  1152   LACTATE mmol/L 1.7     Estimated Creatinine Clearance: 11.6 mL/min (A) (by C-G formula based on SCr of 6.37 mg/dL (H)).  CPK          5/6/2024    02:07   Common Labs   Creatine Kinase 12       Procalitonin Results:      Lab 05/05/24  2352   PROCALCITONIN 1.89*      Brief Urine Lab Results  (Last result in the past 365 days)        Color   Clarity   Blood   Leuk Est   Nitrite   Protein   CREAT   Urine HCG        05/06/24 1206             60.0         05/06/24 1206 Yellow   Cloudy   Trace   Small (1+)   Negative   >=300 mg/dL (3+)                  No results found for: \"SITE\", \"ALLENTEST\", \"PHART\", \"RGD9GQT\", \"PO2ART\", \"INQ1VKV\", \"BASEEXCESS\", \"O4JRTODE\", \"HGBBG\", \"HCTABG\", \"OXYHEMOGLOBI\", \"METHHGBN\", \"CARBOXYHGB\", \"CO2CT\", \"BAROMETRIC\", \"MODALITY\", \"FIO2\"     Microbiology:  5/6 blood culture ×2: In progress    Radiology:  Imaging Results (Last 72 Hours)       Procedure " Component Value Units Date/Time    CT Abdomen Pelvis Without Contrast [[id number]] Collected: 05/06/24 0050     Updated: 05/06/24 0057    Narrative:      CT ABDOMEN PELVIS WO CONTRAST    Date of Exam: 5/6/2024 12:29 AM EDT    Indication: diarrhea, vomiting, hypotension.    Comparison: None available.    Technique: Axial CT images were obtained of the abdomen and pelvis without the administration of contrast. Reconstructed coronal and sagittal images were also obtained. Automated exposure control and iterative construction methods were used.      Findings:  Findings in the chest discussed in a separate report. No acute findings in the superficial soft tissues. No acute osseous abnormalities or destructive bone lesions. There is mild thoracolumbar spondylosis. There are minor osteophytes of both hips.    The liver, gallbladder, bile ducts, pancreas, mid and distal stomach, duodenum and spleen appear unremarkable. Adrenal glands appear normal. There is marked bilateral perinephric stranding and fluid without organization. This appears confined to Gerota's   fascia. Kidney parenchyma appears homogeneous. No hydronephrosis. No urolithiasis. Mild urinary bladder wall thickening appears unchanged. No pericystic inflammation. There is mild colonic diverticulosis and mild colonic fecal retention. No small bowel   distention. There is moderate aortoiliac atherosclerosis without evidence of aneurysm. No ascites, pneumoperitoneum or lymphadenopathy.      Impression:      Impression:  Marked bilateral perinephric stranding and fluid without hydronephrosis. Appearance is nonspecific, but could be related to pyelonephritis. Correlate with urinalysis.            Electronically Signed: Raulito Crenshaw MD    5/6/2024 12:54 AM EDT    Workstation ID: [id number]    CT Chest Without Contrast Diagnostic [[id number]] Collected: 05/06/24 0044     Updated: 05/06/24 0053    Narrative:      CT CHEST WO CONTRAST DIAGNOSTIC    Date of Exam: 5/6/2024  12:29 AM EDT    Indication: hypotension, chills, vomiting.    Comparison: Chest radiograph 5/5/2024 and chest CT 4/24/2024.    Technique: Axial CT images were obtained of the chest without contrast administration.  Reconstructed coronal and sagittal images were also obtained. Automated exposure control and iterative construction methods were used.      Findings:  There is a stable small loculated right-sided pleural effusion. There is stable scarring in the lung apices along with paraseptal emphysema. Stable subpleural interstitial disease in both lungs, right greater than left favored to be chronic. There is a   small calcified cluster of granulomas in the left upper lobe. There appear to be changes of right lower lobectomy. There is no pneumothorax. No left-sided pleural effusion. No new or enlarging lung nodules.    The thyroid, trachea and esophagus appear within normal limits. There is a small hiatal hernia. Heart size is normal. There are severe coronary artery calcifications. ICD leads noted in the right heart. Mild aortic atherosclerosis. No aneurysm. No   mediastinal lymphadenopathy or pericardial effusion.    There is a stable left-sided chest port. Stable right-sided ICD. No acute findings in the superficial soft tissues. There is new marked bilateral perinephric stranding and trace unorganized fluid. The kidneys are only partially included on this study. No   additional findings in the limited images of the upper abdomen. No acute osseous abnormality or destructive bone lesion. There are moderate lower cervical and mild diffuse thoracic degenerative changes. There are healing minimally displaced fractures of   the right lateral fifth and sixth ribs, which appears not significantly changed from the prior study. No new rib fractures.      Impression:      Impression:    1. Stable small loculated right-sided pleural effusion with stable postoperative and chronic changes in both lungs.  2. Severe coronary  artery disease.  3. Grossly abnormal appearance of partially visualized kidneys in the upper retroperitoneum. Appearance could be due to infection, inflammation or obstruction. Recommend dedicated imaging of the abdomen and pelvis ideally with IV contrast.  4. Small hiatal hernia.  5. Stable appearance of healing right lateral fifth and sixth rib fractures.          Electronically Signed: Raulito Crenshaw MD    5/6/2024 12:49 AM EDT    Workstation ID: [id number]    XR Chest 1 View [[id number]] Collected: 05/06/24 0020     Updated: 05/06/24 0024    Narrative:      XR CHEST 1 VW    Date of Exam: 5/5/2024 11:47 PM EDT    Indication: hypotension    Comparison: 4/24/2024 and chest CT same date.    Findings:  Stable volume loss in the right lung. Stable small right-sided pleural effusion and right basilar scarring. Left lung remains clear. Stable diffuse interstitial prominence in the lungs. No pneumothorax or new lung opacity. Stable right-sided multi lead   ICD. Heart size is unchanged. Pulmonary vasculature is within normal limits. No acute osseous abnormalities. Stable left-sided chest port.      Impression:      Impression:  Stable chronic and postoperative changes and small right-sided pleural effusion.      Electronically Signed: Raulito Crenshaw MD    5/6/2024 12:21 AM EDT    Workstation ID: [id number]            IMPRESSION:     Problems:  Recurrent sepsis, now presenting with septic shock with severe leukocytosis with neutrophilia, elevated procalcitonin level, lactic acidosis, hypotension with pressor requirement. Could be due to pyelonephritis that is noted on imaging but no positive culture data today.  Multiple urine cultures have been sent although may have been antibiotic modified.  Blood cultures have been no growth. Karius test from last admission was negative although was modified by antibiotics. He is seemingly responded well to cefepime.  He had recurrence on oral Levaquin.  May need to give him a longer course  of empiric cefepime at time of discharge. Seems to be improving again on cefepime. He has a very unusual case but this does seem to be an infectious etiology/bacterial infection that is responding to cefepime but he quickly declined after stopping cefepime each time even after his long is a 10 day course.  Bilateral pyelonephritis-present on imaging and some symptoms consistent with pyelonephritis but no culprit identified on culture.  No hydronephrosis or renal stone.  Unusual presentation in immunocompromised host. Urine culture was again no growth.  Acute renal failure-Worse.  Creatinine increased to 6.37.  Urine output is improving.  Nausea and vomiting-overall improved but is having some slight nausea   Diarrhea-Resolved.  Per his girlfriend who is at bedside she states that he had more of incontinence prior to arrival due to altered mental status and was not severe watery diarrhea.  Stable small loculated right-sided pleural effusion-likely postoperative after lobectomy.  No shortness of breath.  I discussed this with Dr. Colmenares and she does not think that this is the source for his infection. I agree with this.  NSCLC/RLL lobectomy in January 2024, recently on Opdivo (last dose on 4/4/24)  Emphysema      RECOMMENDATIONS:    -Closely follow CBC and CMP  -Follow pending blood cultures-No growth to date  -Urine culture-no growth final  -DAMIÁN ordered  -Agree with cefepime 2 g IV every 24 hours. May need to give a longer course of IV antibiotic therapy.  Could consider a four-week course empirically if no new source identified      I discussed in length of the patient and his girlfriend today    I discussed the patient's complex case with Dr. Wiggins today    I discussed the patient's case with Dr. Padgett with cardiology today. He agrees that a DAMIÁN sounds reasonable.  Dr. Box is out of town.    Tentative antibiotic plan at the time of discharge.    UM/XAVI:  Cefepime 2g IV q24h. Can infuse INPAT at  [facility] through [facility] if ok with oncology. Tentatively planning to continue this antibiotic for 4 weeks until 6/3/24.  Weekly CBC, CMP, CRP  Follow-up in my clinic within 1 week of discharge  Please fax a copy of my orders to [facility] at [phone] and call [phone] with final discharge plans     Thank you for asking me to see David Thayer Lico.  Our group would be pleased to follow this patient over the course of their hospitalization and assist with outpatient antimicrobial therapy, as indicated.  Further recommendations depend on the results of the cultures and clinical course.    Complex MDM.     Derian Barry MD  5/8/2024

## 2024-05-08 NOTE — PROGRESS NOTES
"   LOS: 2 days    Patient Care Team:  Hayley Castellanos APRN as PCP - General (Nurse Practitioner)  Octaviano Sampson MD as Consulting Physician (Pulmonary Disease)  Neetu Ashley MD as Referring Physician (Hematology and Oncology)  Nimo Rodríguez MD as Consulting Physician (Radiation Oncology)    Subjective     Seen and examined at bedside for the first time.   Patient reports feeling much better.  Denies chest pain or shortness of breath   Noticed improvement in urine output.  Discussed extensively about worsening renal function.  But there is no acute indication for dialysis today.  Will start gentle hydration.  Can assess for dialysis on daily basis    Objective     Vital Signs:  Blood pressure 159/83, pulse 82, temperature 97 °F (36.1 °C), temperature source Axillary, resp. rate 17, height 182.9 cm (72\"), weight 81.7 kg (180 lb 1.6 oz), SpO2 96%.      Intake/Output Summary (Last 24 hours) at 5/8/2024 1626  Last data filed at 5/8/2024 1344  Gross per 24 hour   Intake 300 ml   Output 150 ml   Net 150 ml        05/07 0701 - 05/08 0700  In: 780 [P.O.:580; I.V.:200]  Out: 170 [Urine:170]    Physical Exam:    GENERAL: WD WM NAD  NEURO: Awake and alert, oriented. No focal deficit  PSYCHIATRIC: NMA. Cooperative with PE  EYE: PE, no icterus, no conjunctivitis  ENT: ommm, dentition intact,  Hearing intact  NECK:  No JVD discernable,  Trachea midline  CV: No edema, RRR  LUNGS:  Quiet,  Nonlabored resp.  Symmetrical expansion  ABDOMEN: Nondistended, soft nontender.  : No Yeung, no palp bladder  SKIN: Warm and dry without rash         Labs:  Results from last 7 days   Lab Units 05/08/24  0413 05/07/24  0502 05/06/24  0035 05/05/24  2352   WBC 10*3/mm3 16.54* 28.99*  --  33.46*   HEMOGLOBIN g/dL 9.1* 9.4*  --  12.0*   HEMOGLOBIN, POC g/dL  --   --  13.6  --    PLATELETS 10*3/mm3 202 175  --  245     Results from last 7 days   Lab Units 05/08/24  0413 05/07/24  0502 05/06/24  1213 05/06/24  0909 05/06/24  0035 " 05/05/24  2352 05/03/24  1632   SODIUM mmol/L 128* 131*  --  136  --  141 138   POTASSIUM mmol/L 4.5 4.5 4.7 5.2  --  4.1 3.9   CHLORIDE mmol/L 97* 99  --  105  --  106 102   CO2 mmol/L 16.0* 17.0*  --  17.0*  --  20.0* 21.7*   BUN mg/dL 47* 36*  --  28*  --  23 18   CREATININE mg/dL 6.37* 5.11*  --  3.70* 3.30* 2.80* 2.22*   CALCIUM mg/dL 8.4* 8.2*  --  8.4*  --  8.7 10.0   PHOSPHORUS mg/dL 4.4 3.1  --   --   --   --   --    MAGNESIUM mg/dL  --  2.3  --   --   --  1.4* 2.0   ALBUMIN g/dL 2.5* 2.7*  --   --   --  3.2* 4.1     Results from last 7 days   Lab Units 05/07/24  0502   ALK PHOS U/L 72   BILIRUBIN mg/dL 0.3   ALT (SGPT) U/L 13   AST (SGOT) U/L 18             Estimated Creatinine Clearance: 11.6 mL/min (A) (by C-G formula based on SCr of 6.37 mg/dL (H)).         A/P:  ARF: Creatinine continues to rise.  Urine output oliguric.  Patient with recurrent ARF with improvement to 2.0 on last discharge..  Current injury likely due to recurrence of ATN i with hemodynamic instability.  Patient with new findings of proteinuria.  See initial consult note for details..  Hopefully will see signs of recovery soon with improved hemodynamics.  For now continue monitor renal function closely.   No indication for dialysis at this time may be required in the next 24 to 48 hours if no signs of improvement.  Will make n.p.o. after midnight for possible dialysis catheter.  Will start bicarb drip.     Sepsis: Resolving.  Off pressors.  Blood pressure increased today on midodrine.  Will decrease midodrine to 5 mg 3 times daily.  Hold if blood pressure remains elevated.    Proteinuria: Patient with 8.8 g proteinuria on ratio.  Previously patient had a microalbumin of 9.  SPEP negative in the past.  Patient with recurrent acute renal failure and perinephric stranding previously thought due to pyelonephritis.  Cultures have been negative.  May have other inflammatory process     Hyponatremia: Sodium down to 128.  All fluids isotonic.      Metabolic acidosis: Bicarb quite low.  Patient initially with elevated lactic acid which has improved from 3.8 down to 1.7 this morning.  For now continue perfusion support.     Volume: No edema.  Chest x-ray stable.  Patient on room air.  Hold IV fluids for now.  Strict I's and O's.     ID:  Patient with sudden onset of chills and profound hypotension.  Required pressors and volume for blood pressure stabilization.  Developed significant leukocytosis with white count in the 40K range.  Patient has been able to wean off pressors.  Leukocytosis improving.  Despite multiple episodes and hospitalizations, underlying etiology remains unknown. Was on Levaquin as outpatient.  CT scan with possible pyelonephritis but cultures have been negative previously.  ID continues to evaluate.     Leukocytosis: Presumably due to underlying infectious process.  Was present last admission with resolution at time of discharge.     GI upset: Has been recurrent over the past month with recurrent admissions.  Denies symptoms yesterday, however has developed nausea today.     History of small cell lung cancer: Post immuno therapy with Opdivo.  Last infusion 4/4/2024.  Follows with Dr. Ashley.     High risk and complexity patient.       Jose Singh MD  05/08/24  16:26 EDT

## 2024-05-09 ENCOUNTER — APPOINTMENT (OUTPATIENT)
Dept: CARDIOLOGY | Facility: HOSPITAL | Age: 74
DRG: 871 | End: 2024-05-09
Payer: MEDICARE

## 2024-05-09 LAB
ALBUMIN SERPL-MCNC: 2.8 G/DL (ref 3.5–5.2)
ANION GAP SERPL CALCULATED.3IONS-SCNC: 14 MMOL/L (ref 5–15)
ASCENDING AORTA: 3.4 CM
BASOPHILS # BLD AUTO: 0.06 10*3/MM3 (ref 0–0.2)
BASOPHILS NFR BLD AUTO: 0.5 % (ref 0–1.5)
BUN SERPL-MCNC: 51 MG/DL (ref 8–23)
BUN/CREAT SERPL: 6.6 (ref 7–25)
CALCIUM SPEC-SCNC: 8.7 MG/DL (ref 8.6–10.5)
CHLORIDE SERPL-SCNC: 98 MMOL/L (ref 98–107)
CO2 SERPL-SCNC: 20 MMOL/L (ref 22–29)
CREAT SERPL-MCNC: 7.72 MG/DL (ref 0.76–1.27)
DEPRECATED RDW RBC AUTO: 53.7 FL (ref 37–54)
EGFRCR SERPLBLD CKD-EPI 2021: 6.8 ML/MIN/1.73
EOSINOPHIL # BLD AUTO: 0.19 10*3/MM3 (ref 0–0.4)
EOSINOPHIL NFR BLD AUTO: 1.6 % (ref 0.3–6.2)
ERYTHROCYTE [DISTWIDTH] IN BLOOD BY AUTOMATED COUNT: 16.3 % (ref 12.3–15.4)
GLUCOSE SERPL-MCNC: 97 MG/DL (ref 65–99)
HCT VFR BLD AUTO: 25.5 % (ref 37.5–51)
HGB BLD-MCNC: 8.4 G/DL (ref 13–17.7)
IMM GRANULOCYTES # BLD AUTO: 0.07 10*3/MM3 (ref 0–0.05)
IMM GRANULOCYTES NFR BLD AUTO: 0.6 % (ref 0–0.5)
LYMPHOCYTES # BLD AUTO: 0.87 10*3/MM3 (ref 0.7–3.1)
LYMPHOCYTES NFR BLD AUTO: 7.4 % (ref 19.6–45.3)
MAGNESIUM SERPL-MCNC: 2.6 MG/DL (ref 1.6–2.4)
MCH RBC QN AUTO: 29.3 PG (ref 26.6–33)
MCHC RBC AUTO-ENTMCNC: 32.9 G/DL (ref 31.5–35.7)
MCV RBC AUTO: 88.9 FL (ref 79–97)
MONOCYTES # BLD AUTO: 1.28 10*3/MM3 (ref 0.1–0.9)
MONOCYTES NFR BLD AUTO: 10.9 % (ref 5–12)
NEUTROPHILS NFR BLD AUTO: 79 % (ref 42.7–76)
NEUTROPHILS NFR BLD AUTO: 9.31 10*3/MM3 (ref 1.7–7)
NRBC BLD AUTO-RTO: 0 /100 WBC (ref 0–0.2)
PHOSPHATE SERPL-MCNC: 5 MG/DL (ref 2.5–4.5)
PLATELET # BLD AUTO: 199 10*3/MM3 (ref 140–450)
PMV BLD AUTO: 9.3 FL (ref 6–12)
POTASSIUM SERPL-SCNC: 4.1 MMOL/L (ref 3.5–5.2)
QT INTERVAL: 378 MS
QTC INTERVAL: 527 MS
RBC # BLD AUTO: 2.87 10*6/MM3 (ref 4.14–5.8)
SINUS: 3.9 CM
SODIUM SERPL-SCNC: 132 MMOL/L (ref 136–145)
WBC NRBC COR # BLD AUTO: 11.78 10*3/MM3 (ref 3.4–10.8)

## 2024-05-09 PROCEDURE — 99152 MOD SED SAME PHYS/QHP 5/>YRS: CPT

## 2024-05-09 PROCEDURE — 25010000002 HEPARIN (PORCINE) PER 1000 UNITS: Performed by: INTERNAL MEDICINE

## 2024-05-09 PROCEDURE — 85025 COMPLETE CBC W/AUTO DIFF WBC: CPT | Performed by: INTERNAL MEDICINE

## 2024-05-09 PROCEDURE — 25010000002 CEFEPIME PER 500 MG: Performed by: INTERNAL MEDICINE

## 2024-05-09 PROCEDURE — 86622 BRUCELLA ANTIBODY: CPT | Performed by: INTERNAL MEDICINE

## 2024-05-09 PROCEDURE — 93312 ECHO TRANSESOPHAGEAL: CPT

## 2024-05-09 PROCEDURE — 0 DEXTROSE 5 % SOLUTION 1,000 ML FLEX CONT: Performed by: STUDENT IN AN ORGANIZED HEALTH CARE EDUCATION/TRAINING PROGRAM

## 2024-05-09 PROCEDURE — 94799 UNLISTED PULMONARY SVC/PX: CPT

## 2024-05-09 PROCEDURE — 94664 DEMO&/EVAL PT USE INHALER: CPT

## 2024-05-09 PROCEDURE — 93312 ECHO TRANSESOPHAGEAL: CPT | Performed by: INTERNAL MEDICINE

## 2024-05-09 PROCEDURE — 25010000002 MIDAZOLAM PER 1 MG: Performed by: INTERNAL MEDICINE

## 2024-05-09 PROCEDURE — 25010000002 FENTANYL CITRATE (PF) 50 MCG/ML SOLUTION: Performed by: INTERNAL MEDICINE

## 2024-05-09 PROCEDURE — 76376 3D RENDER W/INTRP POSTPROCES: CPT

## 2024-05-09 PROCEDURE — 93325 DOPPLER ECHO COLOR FLOW MAPG: CPT | Performed by: INTERNAL MEDICINE

## 2024-05-09 PROCEDURE — 76376 3D RENDER W/INTRP POSTPROCES: CPT | Performed by: INTERNAL MEDICINE

## 2024-05-09 PROCEDURE — 93320 DOPPLER ECHO COMPLETE: CPT

## 2024-05-09 PROCEDURE — 80069 RENAL FUNCTION PANEL: CPT | Performed by: INTERNAL MEDICINE

## 2024-05-09 PROCEDURE — 93325 DOPPLER ECHO COLOR FLOW MAPG: CPT

## 2024-05-09 PROCEDURE — 94761 N-INVAS EAR/PLS OXIMETRY MLT: CPT

## 2024-05-09 PROCEDURE — 99232 SBSQ HOSP IP/OBS MODERATE 35: CPT | Performed by: HOSPITALIST

## 2024-05-09 PROCEDURE — 99152 MOD SED SAME PHYS/QHP 5/>YRS: CPT | Performed by: INTERNAL MEDICINE

## 2024-05-09 PROCEDURE — 83735 ASSAY OF MAGNESIUM: CPT | Performed by: HOSPITALIST

## 2024-05-09 PROCEDURE — 93320 DOPPLER ECHO COMPLETE: CPT | Performed by: INTERNAL MEDICINE

## 2024-05-09 RX ORDER — FENTANYL CITRATE 50 UG/ML
INJECTION, SOLUTION INTRAMUSCULAR; INTRAVENOUS
Status: COMPLETED | OUTPATIENT
Start: 2024-05-09 | End: 2024-05-09

## 2024-05-09 RX ORDER — MIDAZOLAM HYDROCHLORIDE 1 MG/ML
INJECTION INTRAMUSCULAR; INTRAVENOUS
Status: COMPLETED | OUTPATIENT
Start: 2024-05-09 | End: 2024-05-09

## 2024-05-09 RX ADMIN — FENTANYL CITRATE 25 MCG: 50 INJECTION, SOLUTION INTRAMUSCULAR; INTRAVENOUS at 11:42

## 2024-05-09 RX ADMIN — FENTANYL CITRATE 25 MCG: 50 INJECTION, SOLUTION INTRAMUSCULAR; INTRAVENOUS at 11:39

## 2024-05-09 RX ADMIN — Medication 10 ML: at 21:01

## 2024-05-09 RX ADMIN — MIDAZOLAM HYDROCHLORIDE 1 MG: 1 INJECTION, SOLUTION INTRAMUSCULAR; INTRAVENOUS at 11:42

## 2024-05-09 RX ADMIN — BUDESONIDE AND FORMOTEROL FUMARATE DIHYDRATE 2 PUFF: 160; 4.5 AEROSOL RESPIRATORY (INHALATION) at 08:23

## 2024-05-09 RX ADMIN — FENTANYL CITRATE 25 MCG: 50 INJECTION, SOLUTION INTRAMUSCULAR; INTRAVENOUS at 11:36

## 2024-05-09 RX ADMIN — CEFEPIME 2000 MG: 2 INJECTION, POWDER, FOR SOLUTION INTRAVENOUS at 08:55

## 2024-05-09 RX ADMIN — MIDAZOLAM HYDROCHLORIDE 1 MG: 1 INJECTION, SOLUTION INTRAMUSCULAR; INTRAVENOUS at 11:36

## 2024-05-09 RX ADMIN — TIOTROPIUM BROMIDE INHALATION SPRAY 2 PUFF: 3.12 SPRAY, METERED RESPIRATORY (INHALATION) at 08:23

## 2024-05-09 RX ADMIN — MIDAZOLAM HYDROCHLORIDE 1 MG: 1 INJECTION, SOLUTION INTRAMUSCULAR; INTRAVENOUS at 11:39

## 2024-05-09 RX ADMIN — MIDAZOLAM HYDROCHLORIDE 1 MG: 1 INJECTION, SOLUTION INTRAMUSCULAR; INTRAVENOUS at 11:34

## 2024-05-09 RX ADMIN — HEPARIN SODIUM 5000 UNITS: 5000 INJECTION INTRAVENOUS; SUBCUTANEOUS at 21:01

## 2024-05-09 RX ADMIN — FENTANYL CITRATE 25 MCG: 50 INJECTION, SOLUTION INTRAMUSCULAR; INTRAVENOUS at 11:34

## 2024-05-09 RX ADMIN — SODIUM BICARBONATE 150 MEQ: 84 INJECTION, SOLUTION INTRAVENOUS at 14:27

## 2024-05-09 RX ADMIN — SENNOSIDES AND DOCUSATE SODIUM 2 TABLET: 8.6; 5 TABLET ORAL at 21:01

## 2024-05-09 RX ADMIN — BUDESONIDE AND FORMOTEROL FUMARATE DIHYDRATE 2 PUFF: 160; 4.5 AEROSOL RESPIRATORY (INHALATION) at 20:12

## 2024-05-09 NOTE — PLAN OF CARE
"Goal Outcome Evaluation:            VSS. Paced on tele. Pt and friend say that the \"bicarb drip makes him restless and want to turn it off\". Bicarb remains infusing. Pt was restless most of the night. NPO since midnight. No acute events overnight.                                   "

## 2024-05-09 NOTE — PROGRESS NOTES
"   LOS: 3 days    Patient Care Team:  Hayley Castellanos APRN as PCP - General (Nurse Practitioner)  Octaviano Sampson MD as Consulting Physician (Pulmonary Disease)  eNetu Ashley MD as Referring Physician (Hematology and Oncology)  Nimo Rodríguez MD as Consulting Physician (Radiation Oncology)    Subjective     Seen and examined at bedside.  No chest pain or shortness of breath.  Was very agitated last night.     Noticed improvement in urine output.  Discussed extensively about worsening renal function, no acute indication for dialysis today. Assess for dialysis on daily basis.  Will keep n.p.o. past midnight.     Objective     Vital Signs:  Blood pressure 153/87, pulse 72, temperature 96.3 °F (35.7 °C), temperature source Axillary, resp. rate 17, height 182.9 cm (72\"), weight 80.9 kg (178 lb 4.8 oz), SpO2 98%.      Intake/Output Summary (Last 24 hours) at 5/9/2024 1427  Last data filed at 5/9/2024 1308  Gross per 24 hour   Intake --   Output 2850 ml   Net -2850 ml        05/08 0701 - 05/09 0700  In: 300   Out: 1700 [Urine:1700]    Physical Exam:    GENERAL: WD WM NAD  NEURO: Awake and alert, oriented. No focal deficit  PSYCHIATRIC: NMA. Cooperative with PE  EYE: PE, no icterus, no conjunctivitis  ENT: ommm, dentition intact,  Hearing intact  NECK:  No JVD discernable,  Trachea midline  CV: No edema, RRR  LUNGS:  Quiet,  Nonlabored resp.  Symmetrical expansion  ABDOMEN: Nondistended, soft nontender.  : No Yeung, no palp bladder  SKIN: Warm and dry without rash     Labs:  Results from last 7 days   Lab Units 05/09/24  0340 05/08/24  0413 05/07/24  0502   WBC 10*3/mm3 11.78* 16.54* 28.99*   HEMOGLOBIN g/dL 8.4* 9.1* 9.4*   PLATELETS 10*3/mm3 199 202 175     Results from last 7 days   Lab Units 05/09/24  0340 05/08/24  0413 05/07/24  0502 05/06/24  1213 05/06/24  0909 05/06/24  0035 05/05/24  2352 05/03/24  1632   SODIUM mmol/L 132* 128* 131*  --  136  --  141 138   POTASSIUM mmol/L 4.1 4.5 4.5 4.7 5.2  --  " 4.1 3.9   CHLORIDE mmol/L 98 97* 99  --  105  --  106 102   CO2 mmol/L 20.0* 16.0* 17.0*  --  17.0*  --  20.0* 21.7*   BUN mg/dL 51* 47* 36*  --  28*  --  23 18   CREATININE mg/dL 7.72* 6.37* 5.11*  --  3.70*   < > 2.80* 2.22*   CALCIUM mg/dL 8.7 8.4* 8.2*  --  8.4*  --  8.7 10.0   PHOSPHORUS mg/dL 5.0* 4.4 3.1  --   --   --   --   --    MAGNESIUM mg/dL 2.6*  --  2.3  --   --   --  1.4* 2.0   ALBUMIN g/dL 2.8* 2.5* 2.7*  --   --   --  3.2* 4.1    < > = values in this interval not displayed.     Results from last 7 days   Lab Units 05/07/24  0502   ALK PHOS U/L 72   BILIRUBIN mg/dL 0.3   ALT (SGPT) U/L 13   AST (SGOT) U/L 18             Estimated Creatinine Clearance: 9.5 mL/min (A) (by C-G formula based on SCr of 7.72 mg/dL (H)).         A/P:  ARF: Creatinine continues to rise.  Urine output oliguric.  Patient with recurrent ARF with improvement to 2.0 on last discharge..  Current injury likely due to recurrence of ATN with hemodynamic instability.  Patient with new findings of proteinuria.  Hopefully will see signs of recovery soon with improved hemodynamics.  For now continue monitor renal function closely.   No indication for dialysis at this time may be required in the next 24 to 48 hours if no signs of improvement.  Will make n.p.o. after midnight for possible dialysis catheter.     Sepsis: Resolving.  Off pressors.  Blood pressure increased today on midodrine.  Will decrease midodrine to 5 mg 3 times daily.  Hold if blood pressure remains elevated.    Proteinuria: Patient with 8.8 g proteinuria on ratio.  Previously patient had a microalbumin of 9.  SPEP negative in the past.  Patient with recurrent acute renal failure and perinephric stranding previously thought due to pyelonephritis.      Hyponatremia: improving.      Metabolic acidosis: Improving with bicarb drip.      Volume: No edema.  Chest x-ray stable.  Patient on room air.  Hold IV fluids for now.  Strict I's and O's.     ID:  Patient with sudden  onset of chills and profound hypotension.  Required pressors and volume for blood pressure stabilization.  Developed significant leukocytosis with white count in the 40K range.  Patient has been able to wean off pressors.  Leukocytosis improving.  Despite multiple episodes and hospitalizations, underlying etiology remains unknown. Was on Levaquin as outpatient.  CT scan with possible pyelonephritis but cultures have been negative previously.  ID continues to evaluate.     GI upset: Has been recurrent over the past month with recurrent admissions.  Denies symptoms      History of small cell lung cancer: Post immuno therapy with Opdivo.  Last infusion 4/4/2024.  Follows with Dr. Ashley.     High risk and complexity patient.       Jose Singh MD  05/09/24  14:27 EDT

## 2024-05-09 NOTE — PLAN OF CARE
Problem: Adult Inpatient Plan of Care  Goal: Plan of Care Review  Outcome: Ongoing, Progressing  Goal: Patient-Specific Goal (Individualized)  Outcome: Ongoing, Progressing  Goal: Absence of Hospital-Acquired Illness or Injury  Outcome: Ongoing, Progressing  Goal: Optimal Comfort and Wellbeing  Outcome: Ongoing, Progressing  Goal: Readiness for Transition of Care  Outcome: Ongoing, Progressing     Problem: Infection Progression (Sepsis/Septic Shock)  Goal: Absence of Infection Signs and Symptoms  Outcome: Ongoing, Progressing   Goal Outcome Evaluation:

## 2024-05-09 NOTE — CASE MANAGEMENT/SOCIAL WORK
Discharge Planning Assessment  [facility]     Patient Name: David Barfield  MRN: [id number]  Today's Date: 5/9/2024    Admit Date: 5/5/2024    Plan: home and plan to come to [facility] office for IVAB   Discharge Needs Assessment    No documentation.                  Discharge Plan       Row Name 05/09/24 1351       Plan    Plan home and plan to come to [facility] office for IVAB    Patient/Family in Agreement with Plan yes    Plan Comments I talked with patient's wife or SO at the . Patient off the floor. Discussed d/c plans and patient needing IVAB at d/c. Plan as of now, Balaji will  bring patient back and forth to [facility] office for IVAB. Patient has a port. CM following    Final Discharge Disposition Code 01 - home or self-care                  Continued Care and Services - Admitted Since 5/5/2024    No active coordination exists for this encounter.       Expected Discharge Date and Time       Expected Discharge Date Expected Discharge Time    May 9, 2024            Demographic Summary    No documentation.                  Functional Status    No documentation.                  Psychosocial    No documentation.                  Abuse/Neglect    No documentation.                  Legal    No documentation.                  Substance Abuse    No documentation.                  Patient Forms    No documentation.                     Katiana Mckeon, RN

## 2024-05-09 NOTE — PROGRESS NOTES
[facility] Medicine Services  PROGRESS NOTE    Patient Name: David Barfield  : 1950  MRN: [id number]    Date of Admission: 2024  Primary Care Physician: Hayley Castellanos APRN    Subjective   Subjective     CC:  Septic shock    HPI:  No acute events over the night.  Hemodynamically stable.  Afebrile.  Plan for DAMIÁN today.    Objective   Objective     Vital Signs:   Temp:  [96.6 °F (35.9 °C)-97.1 °F (36.2 °C)] 97 °F (36.1 °C)  Heart Rate:  [70-87] 76  Resp:  [16-18] 16  BP: (153-159)/(75-87) 153/87     Physical Exam:  Physical Exam  Vitals and nursing note reviewed.   Constitutional:       Appearance: Normal appearance.   HENT:      Head: Normocephalic and atraumatic.   Cardiovascular:      Rate and Rhythm: Normal rate.   Pulmonary:      Effort: Pulmonary effort is normal.   Abdominal:      General: Abdomen is flat. Bowel sounds are normal.   Skin:     General: Skin is warm.   Neurological:      General: No focal deficit present.      Mental Status: He is alert. Mental status is at baseline.   Psychiatric:         Mood and Affect: Mood normal.          Results Reviewed:  LAB RESULTS:      Lab 24  0340 24  0413 24  0502 24  1152 24  0909 24  0518 24  0035 24  2352 24  1632   WBC 11.78* 16.54* 28.99*  --   --   --   --  33.46* 12.84*   HEMOGLOBIN 8.4* 9.1* 9.4*  --   --   --   --  12.0* 11.1*   HEMOGLOBIN, POC  --   --   --   --   --   --  13.6  --   --    HEMATOCRIT 25.5* 27.9* 29.0*  --   --   --   --  38.9 34.7*   HEMATOCRIT POC  --   --   --   --   --   --  40  --   --    PLATELETS 199 202 175  --   --   --   --  245 326   NEUTROS ABS 9.31* 13.85* 26.18*  --   --   --   --  29.67* 8.95*   IMMATURE GRANS (ABS) 0.07* 0.09* 0.22*  --   --   --   --  0.34* 0.19*   LYMPHS ABS 0.87 0.80 0.96  --   --   --   --  1.32 1.89   MONOS ABS 1.28* 1.47* 1.26*  --   --   --   --  1.94* 1.54*   EOS ABS 0.19 0.25 0.25  --   --   --   --   0.05 0.17   MCV 88.9 92.7 91.5  --   --   --   --  95.3 93.3   PROCALCITONIN  --   --   --   --   --   --   --  1.89*  --    LACTATE  --   --   --  1.7 2.8* 2.1*  --  3.8*  --    PROTIME  --  18.0*  --   --   --   --   --   --   --          Lab 05/09/24 0340 05/08/24 0413 05/07/24 0502 05/06/24  1213 05/06/24  0909 05/06/24 0035 05/05/24 2352 05/03/24  1632   SODIUM 132* 128* 131*  --  136  --  141 138   POTASSIUM 4.1 4.5 4.5 4.7 5.2  --  4.1 3.9   CHLORIDE 98 97* 99  --  105  --  106 102   CO2 20.0* 16.0* 17.0*  --  17.0*  --  20.0* 21.7*   ANION GAP 14.0 15.0 15.0  --  14.0  --  15.0 14.3   BUN 51* 47* 36*  --  28*  --  23 18   CREATININE 7.72* 6.37* 5.11*  --  3.70* 3.30* 2.80* 2.22*   EGFR 6.8* 8.5* 11.1*  --  16.3* 18.7* 22.8* 30.1*   GLUCOSE 97 90 93  --  128*  --  114* 124*   CALCIUM 8.7 8.4* 8.2*  --  8.4*  --  8.7 10.0   MAGNESIUM 2.6*  --  2.3  --   --   --  1.4* 2.0   PHOSPHORUS 5.0* 4.4 3.1  --   --   --   --   --    HEMOGLOBIN A1C  --   --   --   --   --   --   --  6.30*   TSH  --   --   --   --   --   --   --  1.510         Lab 05/09/24 0340 05/08/24 0413 05/07/24 0502 05/05/24 2352 05/03/24  1632   TOTAL PROTEIN  --   --  6.1 7.1 7.8   ALBUMIN 2.8* 2.5* 2.7* 3.2* 4.1   GLOBULIN  --   --  3.4 3.9 3.7   ALT (SGPT)  --   --  13 22 17   AST (SGOT)  --   --  18 30 22   BILIRUBIN  --   --  0.3 0.4 0.3   ALK PHOS  --   --  72 76 89   LIPASE  --   --   --  30  --          Lab 05/08/24  0413 05/06/24  0207 05/05/24 2352   HSTROP T  --  37* 30*   PROTIME 18.0*  --   --    INR 1.47*  --   --          Lab 05/03/24  1632   CHOLESTEROL 157   LDL CHOL 94   HDL CHOL 27*   TRIGLYCERIDES 206*         Lab 05/05/24  2352 05/03/24  1632   IRON  --  48*   FOLATE  --  13.40   VITAMIN B 12  --  491   ABO TYPING A  --    RH TYPING Negative  --    ANTIBODY SCREEN Negative  --          Brief Urine Lab Results  (Last result in the past 365 days)        Color   Clarity   Blood   Leuk Est   Nitrite   Protein   CREAT    Urine HCG        05/06/24 1206             60.0         05/06/24 1206 Yellow   Cloudy   Trace   Small (1+)   Negative   >=300 mg/dL (3+)                   Microbiology Results Abnormal       Procedure Component Value - Date/Time    Blood Culture - Blood, Arm, Right [[id number]]  (Normal) Collected: 05/06/24 0054    Lab Status: Preliminary result Specimen: Blood from Arm, Right Updated: 05/09/24 0115     Blood Culture No growth at 3 days    Blood Culture - Blood, Wrist, Right [[id number]]  (Normal) Collected: 05/06/24 1228    Lab Status: Preliminary result Specimen: Blood from Wrist, Right Updated: 05/09/24 0100     Blood Culture No growth at 3 days    Urine Culture - Urine, Urine, Clean Catch [[id number]]  (Normal) Collected: 05/06/24 1206    Lab Status: Final result Specimen: Urine, Clean Catch Updated: 05/07/24 0956     Urine Culture No growth    Gastrointestinal Panel, PCR - Stool, Per Rectum [[id number]]  (Normal) Collected: 05/07/24 0509    Lab Status: Final result Specimen: Stool from Per Rectum Updated: 05/07/24 0751     Campylobacter Not Detected     Plesiomonas shigelloides Not Detected     Salmonella Not Detected     Vibrio Not Detected     Vibrio cholerae Not Detected     Yersinia enterocolitica Not Detected     Enteroaggregative E. coli (EAEC) Not Detected     Enteropathogenic E. coli (EPEC) Not Detected     Enterotoxigenic E. coli (ETEC) lt/st Not Detected     Shiga-like toxin-producing E. coli (STEC) stx1/stx2 Not Detected     Shigella/Enteroinvasive E. coli (EIEC) Not Detected     Cryptosporidium Not Detected     Cyclospora cayetanensis Not Detected     Entamoeba histolytica Not Detected     Giardia lamblia Not Detected     Adenovirus F40/41 Not Detected     Astrovirus Not Detected     Norovirus GI/GII Not Detected     Rotavirus A Not Detected     Sapovirus (I, II, IV or V) Not Detected    Eosinophil Smear - Urine, Urine, Clean Catch [[id number]]  (Normal) Collected: 05/06/24 1206    Lab Status: Final  result Specimen: Urine, Clean Catch Updated: 05/06/24 1716     Eosinophil Smear 0 % EOS/100 Cells     Narrative:      No eosinophil seen    Legionella Antigen, Urine - Urine, Urine, Clean Catch [[id number]]  (Normal) Collected: 05/06/24 0507    Lab Status: Final result Specimen: Urine, Clean Catch Updated: 05/06/24 0940     LEGIONELLA ANTIGEN, URINE Negative    S. Pneumo Ag Urine or CSF - Urine, Urine, Clean Catch [[id number]]  (Normal) Collected: 05/06/24 0507    Lab Status: Final result Specimen: Urine, Clean Catch Updated: 05/06/24 0940     Strep Pneumo Ag Negative    MRSA Screen, PCR (Inpatient) - Swab, Nares [[id number]]  (Normal) Collected: 05/06/24 0656    Lab Status: Final result Specimen: Swab from Nares Updated: 05/06/24 0831     MRSA PCR Negative    Narrative:      The negative predictive value of this diagnostic test is high and should only be used to consider de-escalating anti-MRSA therapy. A positive result may indicate colonization with MRSA and must be correlated clinically.  MRSA Negative    COVID PRE-OP / PRE-PROCEDURE SCREENING ORDER (NO ISOLATION) - Swab, Nasopharynx [[id number]]  (Normal) Collected: 05/05/24 2353    Lab Status: Final result Specimen: Swab from Nasopharynx Updated: 05/06/24 0102    Narrative:      The following orders were created for panel order COVID PRE-OP / PRE-PROCEDURE SCREENING ORDER (NO ISOLATION) - Swab, Nasopharynx.  Procedure                               Abnormality         Status                     ---------                               -----------         ------                     Respiratory Panel PCR w/...[[id number]]  Normal              Final result                 Please view results for these tests on the individual orders.    Respiratory Panel PCR w/COVID-19(SARS-CoV-2) [facility] In-House, NP Swab in UTM/VTM, 2 HR TAT - Swab, Nasopharynx [[id number]]  (Normal) Collected: 05/05/24 2353    Lab Status: Final result Specimen: Swab from Nasopharynx  Updated: 05/06/24 0102     ADENOVIRUS, PCR Not Detected     Coronavirus 229E Not Detected     Coronavirus HKU1 Not Detected     Coronavirus NL63 Not Detected     Coronavirus OC43 Not Detected     COVID19 Not Detected     Human Metapneumovirus Not Detected     Human Rhinovirus/Enterovirus Not Detected     Influenza A PCR Not Detected     Influenza B PCR Not Detected     Parainfluenza Virus 1 Not Detected     Parainfluenza Virus 2 Not Detected     Parainfluenza Virus 3 Not Detected     Parainfluenza Virus 4 Not Detected     RSV, PCR Not Detected     Bordetella pertussis pcr Not Detected     Bordetella parapertussis PCR Not Detected     Chlamydophila pneumoniae PCR Not Detected     Mycoplasma pneumo by PCR Not Detected    Narrative:      In the setting of a positive respiratory panel with a viral infection PLUS a negative procalcitonin without other underlying concern for bacterial infection, consider observing off antibiotics or discontinuation of antibiotics and continue supportive care. If the respiratory panel is positive for atypical bacterial infection (Bordetella pertussis, Chlamydophila pneumoniae, or Mycoplasma pneumoniae), consider antibiotic de-escalation to target atypical bacterial infection.            No radiology results from the last 24 hrs    Results for orders placed during the hospital encounter of 04/24/24    Adult Transthoracic Echo Complete W/ Cont if Necessary Per Protocol    Interpretation Summary    Left ventricular ejection fraction appears to be 61 - 65%.    Left ventricular wall thickness is consistent with mild concentric hypertroph    There is calcification and thickening of the aortic valve.  No independently mobile vegetations visualized.    Mild aortic valve regurgitation is present    Mild dilation of the aortic root is present.  Measures 4.0 cm.    There are pacemaker leads noted in the right atrium/right ventricle.    Mild tricuspid valve regurgitation is present. Estimated right  ventricular systolic pressure from tricuspid regurgitation is normal (<35 mmHg).    Mild mitral valve regurgitation is present      Current medications:  Scheduled Meds:budesonide-formoterol, 2 puff, Inhalation, BID - RT   And  tiotropium bromide monohydrate, 2 puff, Inhalation, Daily - RT  cefepime, 2,000 mg, Intravenous, Q24H  famotidine, 20 mg, Oral, Daily  heparin (porcine), 5,000 Units, Subcutaneous, Q12H  senna-docusate sodium, 2 tablet, Oral, BID  sodium chloride, 10 mL, Intravenous, Q12H      Continuous Infusions:sodium bicarbonate 8.4 % 150 mEq in dextrose (D5W) 5 % 1,000 mL infusion (greater than 100 mEq), 150 mEq, Last Rate: 150 mEq (05/08/24 2208)      PRN Meds:.  acetaminophen **OR** acetaminophen    albuterol    senna-docusate sodium **AND** polyethylene glycol **AND** bisacodyl **AND** bisacodyl    HYDROcodone-acetaminophen    midodrine    nitroglycerin    ondansetron ODT **OR** ondansetron    sodium chloride    sodium chloride    Assessment & Plan   Assessment & Plan     Active Hospital Problems    Diagnosis  POA    **Acute pyelonephritis [N10]  Yes    CAD (coronary artery disease) [I25.10]  Yes    Pyelonephritis [N12]  Yes    RAACELI (acute kidney injury) [N17.9]  Yes    Septic shock [A41.9, R65.21]  Yes    Primary hypertension [I10]  Yes    GERD without esophagitis [K21.9]  Yes    Malignant neoplasm of lower lobe of right lung [C34.31]  Yes    Tobacco abuse [Z72.0]  Yes    Centrilobular emphysema [J43.2]  Yes    Mixed hyperlipidemia [E78.2]  Yes      Resolved Hospital Problems   No resolved problems to display.        Brief Hospital Course to date:  David Barfield is a 74 y.o. male with history of non-small cell lung ca s/p neoadjuvant chemoimmunotherapy and RLL lobectomy currently on Opdivo (last 4/4/24), HTN, emphysema, presence of port and pacemaker, current tobacco use, recent admission 4/12-4/22 & 4/24 - 4/29 for sepsis related to pyelonephritis who was admitted on 05/05 for septic shock  secondary to possible another episode of acute pyelonephritis.  Patient was started on pressors and antibiotic.  Patient also developed oliguric ARACELI.  ID/nephrology/oncology consulted.  Patient now on cefepime and midodrine.  He was downgraded to medical floor on 05/07.    Acute pyelonephritis.  Recurrent  ARACELI.  Likely ATN.  Worsening.  Urine output improving today.  Septic shock secondary to the above.  Resolved  Metabolic acidosis/hyponatremia.  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  Continue breathing treatment    Plan:  Afebrile.  Blood cultures with no growth to date.  White blood count is trending down.TTE today.  Continue IV antibiotic.  Patient will need 4 weeks of IV antibiotic through 06/03.  Off pressors.  Blood pressure has been in 140s 150s.  Switch midodrine 5 mg to as needed if MAP less than 5 monitor urine output.   Nephrology on board.  Kidney function are trending up.  Decent urine output.  If no improvement patient might need hemodialysis.  Oncology on board.  Hold further therapy in the setting of acute infection and ARACELI.  Continue breathing treatment.     I tried to call the patient's son (Gamaliel) 05/08 with updates.  It went to voicemail.  I discussed the plan with the significant other today at bedside.  Questions answered.        Expected Discharge Location and Transportation: Likely home.  Expected Discharge   Expected Discharge Date: 5/9/2024; Expected Discharge Time:      DVT prophylaxis:  Medical DVT prophylaxis orders are present.         AM-PAC 6 Clicks Score (PT): 24 (05/09/24 0400)    CODE STATUS:   Code Status and Medical Interventions:   Ordered at: 05/06/24 0331     Code Status (Patient has no pulse and is not breathing):    CPR (Attempt to Resuscitate)     Medical Interventions (Patient has pulse or is breathing):    Full Support       Kael Wiggins MD  05/09/24

## 2024-05-09 NOTE — PROGRESS NOTES
INFECTIOUS DISEASE Progress note    David Barfield  1950  [id number]    Date of consult: 5/6/24    Admit date: 5/5/2024    Requesting Provider: Abhishek Alan APRN   Evaluating physician: Derian Barry MD  Reason for Consultation: Recent treatment  Chief Complaint: Vague abdominal pain      Subjective   History of present illness:  David Barfield is a  74 y.o.  Yr old male with PMH NSCLC/RLL lobectomy (1/2024) recently on Opdivo (last dose on 4/4/24), HTN, and emphysema who I have followed during multiple recent admissions after he presented with vague abdominal pain, Nausea, vomiting, Diarrhea, severe leukocytosis with neutrophilia, and recent renal dysfunction.  During his initial admission he was thought to have acute pyelonephritis but urine culture was no growth and blood cultures were no growth.  He clinically responded to cefepime and received a 10 day course of antibiotic coverage and was discharged off of antibiotics after clinical improvement.  His renal function had improved significantly at the time of the discharge.  He subsequently presented back to [facility] last month with similar symptoms and his severe leukocytosis.  C. Difficile test and GI PCR were negative.  CT abdomen and pelvis showed improving stranding around his kidneys consistent with improving pyelonephritis.  Urine culture was negative and blood cultures were again negative. Karius test was negative (Although was antibiotic modified). TTE did not show vegetation.  He clinically improved on cefepime. He was subsequently discharged on Levaquin with plans for a one-week supply and was supposed to follow-up in my clinic today with repeat blood work.     The patient presented back to the hospital last night feeling extremely weak with a near syncopal episode, chills, back pain, nausea and vomiting, and one episode of diarrhea. The patient was found to be hypotensive and has been admitted to the ICU and is on pressors.  Lactic acid was elevated at 3.8 on arrival.  Pro-calcitonin was 1.89.  White blood cell count was elevated back to 33.46.  Creatinine was 2.8, up from 2.03 when he was discharged on 4/29. Respiratory PCR panel was negative.  Cortisol level was 26.23.  Urine Legionella and urine strep pneumo antigens were negative.  MRSA PCR nares is negative.  Blood cultures are in progress. CT chest shows stable small loculated right-sided pleural effusion with stable postoperative and chronic changes in both lungs.  CT abdomen and pelvis showed markedly bilateral perinephric stranding and fluid without hydronephrosis.  Appearance is nonspecific per radiology but could be related to pyelonephritis. The patient was initially given vancomycin and Zosyn in the ER and then has been switched to cefepime 2 g IV every 24 hours.  He remains in the ICU.  ID has been consultative for recommendations regarding the patient's recurrent sepsis and pyelonephritis.    Subjective:    5/7/24: The patient is feeling somewhat better.  Nausea and vomiting has improved.  Creatinine is worse at 5.11.  GI PCR panel was negative.  White blood cell count is slightly better at 28.99.  No severe diarrhea.  Having vague abdominal pain but improved.  No new rashes reported.  No chest pain or shortness of breath.  No fevers.    5/8/24: The patient continues to feel better.  Urine output is improving.  He remains afebrile.  Having some slight nausea but no emesis.  Not eating much.  No severe diarrhea. White blood cell count trended down to 16.54.  Creatinine has increased to 6.37.    5/9/24: The patient is feeling worse today.  He keeps rubbing his face per his girlfriend who is at bedside.  He is very fatigued.  Having some nausea and did have one episode of emesis over the last 24 hours.  No severe watery diarrhea. No fevers. White blood cell count has trended down to 11.78.  Creatinine is worse at 7.72.  He states that he is urinating a lot.     Past Medical  History:   Diagnosis Date    Abnormal ECG     Arrhythmia 2019    Asthma 2019    Emphysema, COPD    Bronchogenic cancer of right lung 10/04/2023    Coronary artery disease 2019    Diabetes mellitus Borderline    Emphysema/COPD     Erectile disorder     GERD (gastroesophageal reflux disease)     History of chemotherapy     Hyperlipidemia     Hypertension 2019    Lung nodule     Mumps     Mumps     Pruritus     after bath    Slow to wake up after anesthesia     Wears dentures     upper only    Wears hearing aid in both ears     usually only wears right       Past Surgical History:   Procedure Laterality Date    BONE BIOPSY      broken bone surgery in his face    BRONCHOSCOPY THORACOTOMY Right 01/09/2024    Procedure: THORACOTOMY FOR LOWER LOBECTOMY AND MEDISTINAL LYMPH NODE DISSECTION RIGHT;  Surgeon: Joey Patel MD;  Location:  [facility] OR;  Service: Cardiothoracic;  Laterality: Right;    BRONCHOSCOPY WITH ION ROBOTIC ASSIST N/A 09/15/2023    Procedure: BRONCHOSCOPY NAVIGATION WITH ENDOBRONCHIAL ULTRASOUND AND ION ROBOT;  Surgeon: Octaviano Sampson MD;  Location:  [facility] ENDOSCOPY;  Service: Robotics - Pulmonary;  Laterality: N/A;  ion #6 - [id number]  - 0015  Cath guide 0061    EBUS balloon removed and intact    CARDIAC ELECTROPHYSIOLOGY PROCEDURE N/A 08/17/2021    Procedure: Pacemaker DC new;  Surgeon: Kayy Box MD;  Location:  [facility] CATH INVASIVE LOCATION;  Service: Cardiology;  Laterality: N/A;    FACIAL FRACTURE SURGERY      LYMPH NODE BIOPSY  2023    PACEMAKER IMPLANTATION         Pediatric History   Patient Parents    Not on file     Other Topics Concern    Not on file   Social History Narrative    Lives in [place], [place]       family history includes Aneurysm in his mother; Cancer in his sister; Dementia in his father; Heart disease in his paternal grandmother; Hypertension in his paternal grandfather; Leukemia in his sister.    Allergies   Allergen Reactions    Cymbalta [Duloxetine Hcl] GI  "Intolerance    Gabapentin Mental Status Change     Pt states that this medication \"makes him feel foolish in his head\".     Remeron [Mirtazapine] Other (See Comments)     Excess sedation    Toradol [Ketorolac Tromethamine] GI Intolerance     Projectile vomiting     Latex Other (See Comments)     Latex allergy     Tape Rash       Immunization History   Administered Date(s) Administered    ABRYSVO (RSV, 60+ or pregnant women 32-36 wks) 10/16/2023    COVID-19 (PFIZER) BIVALENT 12+YRS 09/16/2022    COVID-19 (PFIZER) Purple Cap Monovalent 01/29/2021, 02/19/2021, 10/18/2021, 04/14/2022    COVID-19 F23 (PFIZER) 12YRS+ (COMIRNATY) 09/26/2023    Fluzone High-Dose 65+yrs 10/06/2023    Pneumococcal Conjugate 20-Valent (PCV20) 10/11/2023       Medication:    Current Facility-Administered Medications:     acetaminophen (TYLENOL) tablet 650 mg, 650 mg, Oral, Q4H PRN, 650 mg at 05/07/24 1138 **OR** acetaminophen (TYLENOL) suppository 650 mg, 650 mg, Rectal, Q4H PRN, Soledad Colmenares MD    albuterol (PROVENTIL) nebulizer solution 0.083% 2.5 mg/3mL, 2.5 mg, Nebulization, Q6H PRN, Soledad Colmenares MD    sennosides-docusate (PERICOLACE) 8.6-50 MG per tablet 2 tablet, 2 tablet, Oral, BID, 2 tablet at 05/08/24 2208 **AND** polyethylene glycol (MIRALAX) packet 17 g, 17 g, Oral, Daily PRN **AND** bisacodyl (DULCOLAX) EC tablet 5 mg, 5 mg, Oral, Daily PRN **AND** bisacodyl (DULCOLAX) suppository 10 mg, 10 mg, Rectal, Daily PRN, Soledad Colmenares MD    budesonide-formoterol (SYMBICORT) 160-4.5 MCG/ACT inhaler 2 puff, 2 puff, Inhalation, BID - RT, 2 puff at 05/09/24 0823 **AND** tiotropium (SPIRIVA RESPIMAT) 2.5 mcg/act aerosol solution inhaler, 2 puff, Inhalation, Daily - RT, Soledad Colmenares MD, 2 puff at 05/09/24 0823    cefepime 2000 mg IVPB in 100 mL NS (MBP), 2,000 mg, Intravenous, Q24H, Derian Barry MD, 2,000 mg at 05/09/24 0855    famotidine (PEPCID) tablet 20 mg, 20 mg, Oral, Daily, Soledad Colmenares " "MD ZOE, 20 mg at 05/08/24 0931    heparin (porcine) 5000 UNIT/ML injection 5,000 Units, 5,000 Units, Subcutaneous, Q12H, Soledad Colmenares MD, 5,000 Units at 05/07/24 2100    HYDROcodone-acetaminophen (NORCO) 7.5-325 MG per tablet 1 tablet, 1 tablet, Oral, Q6H PRN, Soledad Colmenares MD    midodrine (PROAMATINE) tablet 5 mg, 5 mg, Oral, Q8H PRN, Kael Wiggins MD    nitroglycerin (NITROSTAT) SL tablet 0.4 mg, 0.4 mg, Sublingual, Q5 Min PRN, Soledad Colmenares MD    ondansetron ODT (ZOFRAN-ODT) disintegrating tablet 4 mg, 4 mg, Oral, Q6H PRN, 4 mg at 05/08/24 1333 **OR** ondansetron (ZOFRAN) injection 4 mg, 4 mg, Intravenous, Q6H PRN, Kael Wiggins MD    sodium bicarbonate 8.4 % 150 mEq in dextrose (D5W) 5 % 1,000 mL infusion (greater than 100 mEq), 150 mEq, Intravenous, Continuous, Jose Singh MD, Last Rate: 75 mL/hr at 05/08/24 2208, 150 mEq at 05/08/24 2208    sodium chloride 0.9 % flush 10 mL, 10 mL, Intravenous, PRN, Soledad Colmenares MD    sodium chloride 0.9 % flush 10 mL, 10 mL, Intravenous, Q12H, Soledad Colmenares MD, 10 mL at 05/08/24 0931    sodium chloride 0.9 % infusion 40 mL, 40 mL, Intravenous, PRN, Soledad Colmenares MD    Please refer to the medical record for a full medication list    Review of Systems:    As above    Physical Exam:   Vital Signs   Temp:  [96.6 °F (35.9 °C)-97.1 °F (36.2 °C)] 97 °F (36.1 °C)  Heart Rate:  [70-87] 76  Resp:  [16-18] 16  BP: (153-159)/(75-87) 153/87    Temp  Min: 96.6 °F (35.9 °C)  Max: 97.1 °F (36.2 °C)  BP  Min: 153/87  Max: 159/83  Pulse  Min: 70  Max: 87  Resp  Min: 16  Max: 18  SpO2  Min: 95 %  Max: 98 %    Blood pressure 153/87, pulse 76, temperature 97 °F (36.1 °C), temperature source Oral, resp. rate 16, height 182.9 cm (72\"), weight 80.9 kg (178 lb 4.8 oz), SpO2 95%.  GENERAL: Awake and alert, In moderate distress.  Ill-appearing.  Resting in bed  HEENT:  Normocephalic, atraumatic.  No external oral lesions noted  EYES: " Pupils equal and round. No conjunctival injection. No icterus.   HEART: RRR, no murmur  LUNGS: CTA B.  Nonlabored breathing on room air.  ABDOMEN: Soft, nontender, nondistended. No appreciable HSM.    GENITAL: no Yeung catheter  SKIN: No generalized rashes noted  PSYCHIATRIC: cooperative.  Appropriate mood and affect  EXT:  No cellulitic change.  NEURO: awake alert and oriented ×4.  Normal speech and cognition    AICD pocket site is without erythema or tenderness    Mediport site is without erythema     Results Review:   I reviewed the patient's new clinical results.  I reviewed the patient's new imaging results and agree with the interpretation.  I reviewed the patient's other test results and agree with the interpretation    Results from last 7 days   Lab Units 05/09/24  0340 05/08/24  0413 05/07/24  0502   WBC 10*3/mm3 11.78* 16.54* 28.99*   HEMOGLOBIN g/dL 8.4* 9.1* 9.4*   HEMATOCRIT % 25.5* 27.9* 29.0*   PLATELETS 10*3/mm3 199 202 175     Results from last 7 days   Lab Units 05/09/24  0340   SODIUM mmol/L 132*   POTASSIUM mmol/L 4.1   CHLORIDE mmol/L 98   CO2 mmol/L 20.0*   BUN mg/dL 51*   CREATININE mg/dL 7.72*   GLUCOSE mg/dL 97   CALCIUM mg/dL 8.7     Results from last 7 days   Lab Units 05/07/24  0502   ALK PHOS U/L 72   BILIRUBIN mg/dL 0.3   ALT (SGPT) U/L 13   AST (SGOT) U/L 18                 Results from last 7 days   Lab Units 05/06/24  1152   LACTATE mmol/L 1.7     Estimated Creatinine Clearance: 9.5 mL/min (A) (by C-G formula based on SCr of 7.72 mg/dL (H)).  CPK          5/6/2024    02:07   Common Labs   Creatine Kinase 12       Procalitonin Results:      Lab 05/05/24  2352   PROCALCITONIN 1.89*      Brief Urine Lab Results  (Last result in the past 365 days)        Color   Clarity   Blood   Leuk Est   Nitrite   Protein   CREAT   Urine HCG        05/06/24 1206             60.0         05/06/24 1206 Yellow   Cloudy   Trace   Small (1+)   Negative   >=300 mg/dL (3+)                  No results found  "for: \"SITE\", \"ALLENTEST\", \"PHART\", \"XDY1JVU\", \"PO2ART\", \"OLJ8JIK\", \"BASEEXCESS\", \"Y1VNSRYT\", \"HGBBG\", \"HCTABG\", \"OXYHEMOGLOBI\", \"METHHGBN\", \"CARBOXYHGB\", \"CO2CT\", \"BAROMETRIC\", \"MODALITY\", \"FIO2\"     Microbiology:  5/6 blood culture ×2: No growth to date    Urine culture: No growth final    Radiology:  Imaging Results (Last 72 Hours)       ** No results found for the last 72 hours. **            IMPRESSION:     Problems:  Recurrent sepsis, now presenting with septic shock with severe leukocytosis with neutrophilia, elevated procalcitonin level, lactic acidosis, hypotension with pressor requirement. Could be due to pyelonephritis that is noted on imaging but no positive culture data today.  Multiple urine cultures have been sent although may have been antibiotic modified.  Blood cultures have been no growth. Karius test from last admission was negative although was modified by antibiotics. He is seemingly responded well to cefepime.  He had recurrence on oral Levaquin.  May need to give him a longer course of empiric cefepime at time of discharge. Seems to be improving again on cefepime. He has a very unusual case but this does seem to be an infectious etiology/bacterial infection that is responding to cefepime but he quickly declined after stopping cefepime each time even after his long is a 10 day course.  Bilateral pyelonephritis-present on imaging and some symptoms consistent with pyelonephritis but no culprit identified on culture.  No hydronephrosis or renal stone.  Unusual presentation in immunocompromised host. Urine culture was again no growth.  Acute renal failure-Worse.  Creatinine increased to 6.37.  Urine output is improving.  Nausea and vomiting-overall improved but is having some slight nausea   Diarrhea-Resolved.  Per his girlfriend who is at bedside she states that he had more of incontinence prior to arrival due to altered mental status and was not severe watery diarrhea.  Stable small loculated " right-sided pleural effusion-likely postoperative after lobectomy.  No shortness of breath.  I discussed this with Dr. Colmenares and she does not think that this is the source for his infection. I agree with this.  NSCLC/RLL lobectomy in January 2024, recently on Opdivo (last dose on 4/4/24)  Emphysema      RECOMMENDATIONS:    -Closely follow CBC and CMP  -Check Brucella serologies.  Although this seems unlikely given his lack of exposures by history (no history of consuming unpasteurized milk products), Brucella can cause pyelonephritis and GI symptoms and could potentially respond partially to antibiotic therapy.  Also may not show up in cultures and carious test that was sent previously was modified by antibiotics.  -Follow pending blood cultures-No growth to date  -Plan for DAMIÁN today  -Agree with cefepime 2 g IV every 24 hours. May need to give a longer course of IV antibiotic therapy.  Could consider a four-week course empirically if no new source identified  -Renal function is worse.  Urine output has increased.  Nephrology is following and monitoring need for dialysis.  May need dialysis soon if his renal function does not recover.    I discussed in length of the patient and his girlfriend today    Tentative antibiotic plan at the time of discharge.    UM/XAVI:  Cefepime 2g IV q24h. Can infuse INPAT at [facility] through mediport if ok with oncology. Tentatively planning to continue this antibiotic for 4 weeks until 6/3/24.  Weekly CBC, CMP, CRP  Follow-up in my clinic within 1 week of discharge  Please fax a copy of my orders to [facility] at [phone] and call [phone] with final discharge plans     Thank you for asking me to see David Barfield.      Complex MDM.     Derian Barry MD  5/9/2024

## 2024-05-10 LAB
ALBUMIN SERPL-MCNC: 2.7 G/DL (ref 3.5–5.2)
ANION GAP SERPL CALCULATED.3IONS-SCNC: 17 MMOL/L (ref 5–15)
BASOPHILS # BLD AUTO: 0.05 10*3/MM3 (ref 0–0.2)
BASOPHILS NFR BLD AUTO: 0.5 % (ref 0–1.5)
BUN SERPL-MCNC: 49 MG/DL (ref 8–23)
BUN/CREAT SERPL: 6.4 (ref 7–25)
CALCIUM SPEC-SCNC: 8.4 MG/DL (ref 8.6–10.5)
CHLORIDE SERPL-SCNC: 92 MMOL/L (ref 98–107)
CO2 SERPL-SCNC: 24 MMOL/L (ref 22–29)
CREAT SERPL-MCNC: 7.64 MG/DL (ref 0.76–1.27)
DEPRECATED RDW RBC AUTO: 52 FL (ref 37–54)
EGFRCR SERPLBLD CKD-EPI 2021: 6.8 ML/MIN/1.73
EOSINOPHIL # BLD AUTO: 0.17 10*3/MM3 (ref 0–0.4)
EOSINOPHIL NFR BLD AUTO: 1.6 % (ref 0.3–6.2)
ERYTHROCYTE [DISTWIDTH] IN BLOOD BY AUTOMATED COUNT: 16.3 % (ref 12.3–15.4)
GLUCOSE SERPL-MCNC: 108 MG/DL (ref 65–99)
HCT VFR BLD AUTO: 25.9 % (ref 37.5–51)
HGB BLD-MCNC: 8.7 G/DL (ref 13–17.7)
IMM GRANULOCYTES # BLD AUTO: 0.04 10*3/MM3 (ref 0–0.05)
IMM GRANULOCYTES NFR BLD AUTO: 0.4 % (ref 0–0.5)
LYMPHOCYTES # BLD AUTO: 1.12 10*3/MM3 (ref 0.7–3.1)
LYMPHOCYTES NFR BLD AUTO: 10.4 % (ref 19.6–45.3)
MCH RBC QN AUTO: 29.3 PG (ref 26.6–33)
MCHC RBC AUTO-ENTMCNC: 33.6 G/DL (ref 31.5–35.7)
MCV RBC AUTO: 87.2 FL (ref 79–97)
MONOCYTES # BLD AUTO: 1.45 10*3/MM3 (ref 0.1–0.9)
MONOCYTES NFR BLD AUTO: 13.5 % (ref 5–12)
NEUTROPHILS NFR BLD AUTO: 7.9 10*3/MM3 (ref 1.7–7)
NEUTROPHILS NFR BLD AUTO: 73.6 % (ref 42.7–76)
NRBC BLD AUTO-RTO: 0 /100 WBC (ref 0–0.2)
PHOSPHATE SERPL-MCNC: 5.9 MG/DL (ref 2.5–4.5)
PLATELET # BLD AUTO: 192 10*3/MM3 (ref 140–450)
PMV BLD AUTO: 9.8 FL (ref 6–12)
POTASSIUM SERPL-SCNC: 4.2 MMOL/L (ref 3.5–5.2)
QT INTERVAL: 416 MS
QTC INTERVAL: 449 MS
RBC # BLD AUTO: 2.97 10*6/MM3 (ref 4.14–5.8)
SODIUM SERPL-SCNC: 133 MMOL/L (ref 136–145)
WBC NRBC COR # BLD AUTO: 10.73 10*3/MM3 (ref 3.4–10.8)

## 2024-05-10 PROCEDURE — 80069 RENAL FUNCTION PANEL: CPT | Performed by: INTERNAL MEDICINE

## 2024-05-10 PROCEDURE — 94664 DEMO&/EVAL PT USE INHALER: CPT

## 2024-05-10 PROCEDURE — 99233 SBSQ HOSP IP/OBS HIGH 50: CPT | Performed by: INTERNAL MEDICINE

## 2024-05-10 PROCEDURE — 85025 COMPLETE CBC W/AUTO DIFF WBC: CPT | Performed by: INTERNAL MEDICINE

## 2024-05-10 PROCEDURE — 25010000002 CEFEPIME PER 500 MG: Performed by: INTERNAL MEDICINE

## 2024-05-10 PROCEDURE — 94799 UNLISTED PULMONARY SVC/PX: CPT

## 2024-05-10 PROCEDURE — 25010000002 HEPARIN (PORCINE) PER 1000 UNITS: Performed by: INTERNAL MEDICINE

## 2024-05-10 PROCEDURE — 99232 SBSQ HOSP IP/OBS MODERATE 35: CPT | Performed by: HOSPITALIST

## 2024-05-10 PROCEDURE — 0 DEXTROSE 5 % SOLUTION 1,000 ML FLEX CONT: Performed by: INTERNAL MEDICINE

## 2024-05-10 RX ORDER — LIDOCAINE 4 G/G
1 PATCH TOPICAL
Status: DISCONTINUED | OUTPATIENT
Start: 2024-05-10 | End: 2024-05-12 | Stop reason: HOSPADM

## 2024-05-10 RX ADMIN — SODIUM BICARBONATE 150 MEQ: 84 INJECTION INTRAVENOUS at 13:45

## 2024-05-10 RX ADMIN — FAMOTIDINE 20 MG: 20 TABLET, FILM COATED ORAL at 10:04

## 2024-05-10 RX ADMIN — HEPARIN SODIUM 5000 UNITS: 5000 INJECTION INTRAVENOUS; SUBCUTANEOUS at 20:38

## 2024-05-10 RX ADMIN — LIDOCAINE 1 PATCH: 4 PATCH TOPICAL at 11:13

## 2024-05-10 RX ADMIN — BUDESONIDE AND FORMOTEROL FUMARATE DIHYDRATE 2 PUFF: 160; 4.5 AEROSOL RESPIRATORY (INHALATION) at 08:14

## 2024-05-10 RX ADMIN — SODIUM BICARBONATE 150 MEQ: 84 INJECTION INTRAVENOUS at 18:31

## 2024-05-10 RX ADMIN — Medication 10 ML: at 20:38

## 2024-05-10 RX ADMIN — CEFEPIME 2000 MG: 2 INJECTION, POWDER, FOR SOLUTION INTRAVENOUS at 08:43

## 2024-05-10 RX ADMIN — SENNOSIDES AND DOCUSATE SODIUM 2 TABLET: 8.6; 5 TABLET ORAL at 20:38

## 2024-05-10 RX ADMIN — TIOTROPIUM BROMIDE INHALATION SPRAY 2 PUFF: 3.12 SPRAY, METERED RESPIRATORY (INHALATION) at 08:14

## 2024-05-10 RX ADMIN — BUDESONIDE AND FORMOTEROL FUMARATE DIHYDRATE 2 PUFF: 160; 4.5 AEROSOL RESPIRATORY (INHALATION) at 20:33

## 2024-05-10 RX ADMIN — HEPARIN SODIUM 5000 UNITS: 5000 INJECTION INTRAVENOUS; SUBCUTANEOUS at 10:05

## 2024-05-10 RX ADMIN — Medication 10 ML: at 09:04

## 2024-05-10 NOTE — PROGRESS NOTES
[facility] Medicine Services  PROGRESS NOTE    Patient Name: David Barfield  : 1950  MRN: [id number]    Date of Admission: 2024  Primary Care Physician: Hayley Castellanos APRN    Subjective   Subjective     CC:  Septic shock    HPI:  No acute events over the night.  Hemodynamically stable.  Creatinine stable.  Still making decent amount of urine.  Nephrology on board.  No plan start hemodialysis yet.  Continue to monitor over the weekend.      Objective   Objective     Vital Signs:   Temp:  [96.3 °F (35.7 °C)-97 °F (36.1 °C)] 97 °F (36.1 °C)  Heart Rate:  [70-81] 70  Resp:  [8-18] 18  BP: (126-180)/() 146/94     Physical Exam:  Physical Exam  Vitals and nursing note reviewed.   Constitutional:       Appearance: Normal appearance.   HENT:      Head: Normocephalic and atraumatic.   Cardiovascular:      Rate and Rhythm: Normal rate.   Pulmonary:      Effort: Pulmonary effort is normal.   Abdominal:      General: Abdomen is flat. Bowel sounds are normal.   Skin:     General: Skin is warm.   Neurological:      General: No focal deficit present.      Mental Status: He is alert. Mental status is at baseline.   Psychiatric:         Mood and Affect: Mood normal.          Results Reviewed:  LAB RESULTS:      Lab 05/10/24  0324 24  0340 24  0413 24  0502 24  1152 24  0909 24  0518 24  0035 24  2352   WBC 10.73 11.78* 16.54* 28.99*  --   --   --   --  33.46*   HEMOGLOBIN 8.7* 8.4* 9.1* 9.4*  --   --   --   --  12.0*   HEMOGLOBIN, POC  --   --   --   --   --   --   --  13.6  --    HEMATOCRIT 25.9* 25.5* 27.9* 29.0*  --   --   --   --  38.9   HEMATOCRIT POC  --   --   --   --   --   --   --  40  --    PLATELETS 192 199 202 175  --   --   --   --  245   NEUTROS ABS 7.90* 9.31* 13.85* 26.18*  --   --   --   --  29.67*   IMMATURE GRANS (ABS) 0.04 0.07* 0.09* 0.22*  --   --   --   --  0.34*   LYMPHS ABS 1.12 0.87 0.80 0.96  --   --   --    --  1.32   MONOS ABS 1.45* 1.28* 1.47* 1.26*  --   --   --   --  1.94*   EOS ABS 0.17 0.19 0.25 0.25  --   --   --   --  0.05   MCV 87.2 88.9 92.7 91.5  --   --   --   --  95.3   PROCALCITONIN  --   --   --   --   --   --   --   --  1.89*   LACTATE  --   --   --   --  1.7 2.8* 2.1*  --  3.8*   PROTIME  --   --  18.0*  --   --   --   --   --   --          Lab 05/10/24  0324 05/09/24  0340 05/08/24  0413 05/07/24  0502 05/06/24  1213 05/06/24  0909 05/06/24  0035 05/05/24  2352 05/03/24  1632   SODIUM 133* 132* 128* 131*  --  136  --  141 138   POTASSIUM 4.2 4.1 4.5 4.5 4.7 5.2  --  4.1 3.9   CHLORIDE 92* 98 97* 99  --  105  --  106 102   CO2 24.0 20.0* 16.0* 17.0*  --  17.0*  --  20.0* 21.7*   ANION GAP 17.0* 14.0 15.0 15.0  --  14.0  --  15.0 14.3   BUN 49* 51* 47* 36*  --  28*  --  23 18   CREATININE 7.64* 7.72* 6.37* 5.11*  --  3.70*   < > 2.80* 2.22*   EGFR 6.8* 6.8* 8.5* 11.1*  --  16.3*   < > 22.8* 30.1*   GLUCOSE 108* 97 90 93  --  128*  --  114* 124*   CALCIUM 8.4* 8.7 8.4* 8.2*  --  8.4*  --  8.7 10.0   MAGNESIUM  --  2.6*  --  2.3  --   --   --  1.4* 2.0   PHOSPHORUS 5.9* 5.0* 4.4 3.1  --   --   --   --   --    HEMOGLOBIN A1C  --   --   --   --   --   --   --   --  6.30*   TSH  --   --   --   --   --   --   --   --  1.510    < > = values in this interval not displayed.         Lab 05/10/24  0324 05/09/24  0340 05/08/24 0413 05/07/24  0502 05/05/24  2352 05/03/24  1632   TOTAL PROTEIN  --   --   --  6.1 7.1 7.8   ALBUMIN 2.7* 2.8* 2.5* 2.7* 3.2* 4.1   GLOBULIN  --   --   --  3.4 3.9 3.7   ALT (SGPT)  --   --   --  13 22 17   AST (SGOT)  --   --   --  18 30 22   BILIRUBIN  --   --   --  0.3 0.4 0.3   ALK PHOS  --   --   --  72 76 89   LIPASE  --   --   --   --  30  --          Lab 05/08/24 0413 05/06/24  0207 05/05/24  2352   HSTROP T  --  37* 30*   PROTIME 18.0*  --   --    INR 1.47*  --   --          Lab 05/03/24  1632   CHOLESTEROL 157   LDL CHOL 94   HDL CHOL 27*   TRIGLYCERIDES 206*         Lab  05/05/24  2352 05/03/24  1632   IRON  --  48*   FOLATE  --  13.40   VITAMIN B 12  --  491   ABO TYPING A  --    RH TYPING Negative  --    ANTIBODY SCREEN Negative  --          Brief Urine Lab Results  (Last result in the past 365 days)        Color   Clarity   Blood   Leuk Est   Nitrite   Protein   CREAT   Urine HCG        05/06/24 1206             60.0         05/06/24 1206 Yellow   Cloudy   Trace   Small (1+)   Negative   >=300 mg/dL (3+)                   Microbiology Results Abnormal       Procedure Component Value - Date/Time    Blood Culture - Blood, Arm, Right [[id number]]  (Normal) Collected: 05/06/24 0054    Lab Status: Preliminary result Specimen: Blood from Arm, Right Updated: 05/10/24 0115     Blood Culture No growth at 4 days    Blood Culture - Blood, Wrist, Right [[id number]]  (Normal) Collected: 05/06/24 1228    Lab Status: Preliminary result Specimen: Blood from Wrist, Right Updated: 05/10/24 0045     Blood Culture No growth at 4 days    Urine Culture - Urine, Urine, Clean Catch [[id number]]  (Normal) Collected: 05/06/24 1206    Lab Status: Final result Specimen: Urine, Clean Catch Updated: 05/07/24 0956     Urine Culture No growth    Gastrointestinal Panel, PCR - Stool, Per Rectum [[id number]]  (Normal) Collected: 05/07/24 0509    Lab Status: Final result Specimen: Stool from Per Rectum Updated: 05/07/24 0751     Campylobacter Not Detected     Plesiomonas shigelloides Not Detected     Salmonella Not Detected     Vibrio Not Detected     Vibrio cholerae Not Detected     Yersinia enterocolitica Not Detected     Enteroaggregative E. coli (EAEC) Not Detected     Enteropathogenic E. coli (EPEC) Not Detected     Enterotoxigenic E. coli (ETEC) lt/st Not Detected     Shiga-like toxin-producing E. coli (STEC) stx1/stx2 Not Detected     Shigella/Enteroinvasive E. coli (EIEC) Not Detected     Cryptosporidium Not Detected     Cyclospora cayetanensis Not Detected     Entamoeba histolytica Not Detected     Giardia  lamblia Not Detected     Adenovirus F40/41 Not Detected     Astrovirus Not Detected     Norovirus GI/GII Not Detected     Rotavirus A Not Detected     Sapovirus (I, II, IV or V) Not Detected    Eosinophil Smear - Urine, Urine, Clean Catch [[id number]]  (Normal) Collected: 05/06/24 1206    Lab Status: Final result Specimen: Urine, Clean Catch Updated: 05/06/24 1716     Eosinophil Smear 0 % EOS/100 Cells     Narrative:      No eosinophil seen    Legionella Antigen, Urine - Urine, Urine, Clean Catch [[id number]]  (Normal) Collected: 05/06/24 0507    Lab Status: Final result Specimen: Urine, Clean Catch Updated: 05/06/24 0940     LEGIONELLA ANTIGEN, URINE Negative    S. Pneumo Ag Urine or CSF - Urine, Urine, Clean Catch [[id number]]  (Normal) Collected: 05/06/24 0507    Lab Status: Final result Specimen: Urine, Clean Catch Updated: 05/06/24 0940     Strep Pneumo Ag Negative    MRSA Screen, PCR (Inpatient) - Swab, Nares [[id number]]  (Normal) Collected: 05/06/24 0656    Lab Status: Final result Specimen: Swab from Nares Updated: 05/06/24 0831     MRSA PCR Negative    Narrative:      The negative predictive value of this diagnostic test is high and should only be used to consider de-escalating anti-MRSA therapy. A positive result may indicate colonization with MRSA and must be correlated clinically.  MRSA Negative    COVID PRE-OP / PRE-PROCEDURE SCREENING ORDER (NO ISOLATION) - Swab, Nasopharynx [[id number]]  (Normal) Collected: 05/05/24 2353    Lab Status: Final result Specimen: Swab from Nasopharynx Updated: 05/06/24 0102    Narrative:      The following orders were created for panel order COVID PRE-OP / PRE-PROCEDURE SCREENING ORDER (NO ISOLATION) - Swab, Nasopharynx.  Procedure                               Abnormality         Status                     ---------                               -----------         ------                     Respiratory Panel PCR w/...[[id number]]  Normal              Final result                  Please view results for these tests on the individual orders.    Respiratory Panel PCR w/COVID-19(SARS-CoV-2) [facility] In-House, NP Swab in UTM/VTM, 2 HR TAT - Swab, Nasopharynx [[id number]]  (Normal) Collected: 05/05/24 9947    Lab Status: Final result Specimen: Swab from Nasopharynx Updated: 05/06/24 0102     ADENOVIRUS, PCR Not Detected     Coronavirus 229E Not Detected     Coronavirus HKU1 Not Detected     Coronavirus NL63 Not Detected     Coronavirus OC43 Not Detected     COVID19 Not Detected     Human Metapneumovirus Not Detected     Human Rhinovirus/Enterovirus Not Detected     Influenza A PCR Not Detected     Influenza B PCR Not Detected     Parainfluenza Virus 1 Not Detected     Parainfluenza Virus 2 Not Detected     Parainfluenza Virus 3 Not Detected     Parainfluenza Virus 4 Not Detected     RSV, PCR Not Detected     Bordetella pertussis pcr Not Detected     Bordetella parapertussis PCR Not Detected     Chlamydophila pneumoniae PCR Not Detected     Mycoplasma pneumo by PCR Not Detected    Narrative:      In the setting of a positive respiratory panel with a viral infection PLUS a negative procalcitonin without other underlying concern for bacterial infection, consider observing off antibiotics or discontinuation of antibiotics and continue supportive care. If the respiratory panel is positive for atypical bacterial infection (Bordetella pertussis, Chlamydophila pneumoniae, or Mycoplasma pneumoniae), consider antibiotic de-escalation to target atypical bacterial infection.            Adult Transesophageal Echo 3D (DAMIÁN) W/ Cont If Necessary Per Protocol    Result Date: 5/9/2024    Left ventricular systolic function is normal. Left ventricular ejection fraction appears to be 61 - 65%. Normal left ventricular cavity size noted. Left ventricular wall thickness is consistent with mild concentric hypertrophy. All left ventricular wall segments contract normally.   There is mild calcification of  the aortic valve. The aortic valve appears trileaflet. Mild aortic valve regurgitation is present. No aortic valve stenosis is present. There is no evidence of an aortic valve mass is present.   There is mild calcification of the mitral valve. There is mild, bileaflet mitral valve thickening present. No evidence of a mitral valve mass is present. Mild to moderate mitral valve regurgitation is present. No significant mitral valve stenosis is present.   The tricuspid valve is structurally normal with no significant stenosis present. There is no evidence of a mass on the tricuspid valve. Trace tricuspid valve regurgitation is present.   The pulmonic valve is grossly normal in structure. There is trace pulmonic valve regurgitation present.   No vegetation seen on atrial aspect of pacemaker leads.  The most distal aspects of the RV lead were not completely visualized however there was no apparent vegetation in the more proximal segment, adjacent to the tricuspid valve which appears to be functioning normally.      Results for orders placed during the hospital encounter of 05/05/24    Adult Transesophageal Echo 3D (DAMIÁN) W/ Cont If Necessary Per Protocol    Interpretation Summary    Left ventricular systolic function is normal. Left ventricular ejection fraction appears to be 61 - 65%. Normal left ventricular cavity size noted. Left ventricular wall thickness is consistent with mild concentric hypertrophy. All left ventricular wall segments contract normally.    There is mild calcification of the aortic valve. The aortic valve appears trileaflet. Mild aortic valve regurgitation is present. No aortic valve stenosis is present. There is no evidence of an aortic valve mass is present.    There is mild calcification of the mitral valve. There is mild, bileaflet mitral valve thickening present. No evidence of a mitral valve mass is present. Mild to moderate mitral valve regurgitation is present. No significant mitral valve  stenosis is present.    The tricuspid valve is structurally normal with no significant stenosis present. There is no evidence of a mass on the tricuspid valve. Trace tricuspid valve regurgitation is present.    The pulmonic valve is grossly normal in structure. There is trace pulmonic valve regurgitation present.    No vegetation seen on atrial aspect of pacemaker leads.  The most distal aspects of the RV lead were not completely visualized however there was no apparent vegetation in the more proximal segment, adjacent to the tricuspid valve which appears to be functioning normally.      Current medications:  Scheduled Meds:budesonide-formoterol, 2 puff, Inhalation, BID - RT   And  tiotropium bromide monohydrate, 2 puff, Inhalation, Daily - RT  cefepime, 2,000 mg, Intravenous, Q24H  famotidine, 20 mg, Oral, Daily  heparin (porcine), 5,000 Units, Subcutaneous, Q12H  senna-docusate sodium, 2 tablet, Oral, BID  sodium chloride, 10 mL, Intravenous, Q12H      Continuous Infusions:sodium bicarbonate 8.4 % 150 mEq in dextrose (D5W) 5 % 1,000 mL infusion (greater than 100 mEq), 150 mEq, Last Rate: 75 mL/hr at 05/09/24 1813      PRN Meds:.  acetaminophen **OR** acetaminophen    albuterol    senna-docusate sodium **AND** polyethylene glycol **AND** bisacodyl **AND** bisacodyl    HYDROcodone-acetaminophen    midodrine    nitroglycerin    ondansetron ODT **OR** ondansetron    sodium chloride    sodium chloride    Assessment & Plan   Assessment & Plan     Active Hospital Problems    Diagnosis  POA    **Acute pyelonephritis [N10]  Yes    CAD (coronary artery disease) [I25.10]  Yes    Pyelonephritis [N12]  Yes    ARACELI (acute kidney injury) [N17.9]  Yes    Septic shock [A41.9, R65.21]  Yes    Primary hypertension [I10]  Yes    GERD without esophagitis [K21.9]  Yes    Malignant neoplasm of lower lobe of right lung [C34.31]  Yes    Tobacco abuse [Z72.0]  Yes    Centrilobular emphysema [J43.2]  Yes    Mixed hyperlipidemia [E78.2]  Yes       Resolved Hospital Problems   No resolved problems to display.        Brief Hospital Course to date:  David Barfield is a 74 y.o. male with history of non-small cell lung ca s/p neoadjuvant chemoimmunotherapy and RLL lobectomy currently on Opdivo (last 4/4/24), HTN, emphysema, presence of port and pacemaker, current tobacco use, recent admission 4/12-4/22 & 4/24 - 4/29 for sepsis related to pyelonephritis who was admitted on 05/05 for septic shock secondary to possible another episode of acute pyelonephritis.  Patient was started on pressors and antibiotic.  Patient also developed oliguric ARACELI.  ID/nephrology/oncology consulted.  Patient now on cefepime and midodrine.  He was downgraded to medical floor on 05/07.    Acute pyelonephritis.  Recurrent  ARACELI.  Likely ATN.  Worsening.  Urine output improving today.  Septic shock secondary to the above.  Resolved  Metabolic acidosis/hyponatremia.  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  Continue breathing treatment    Plan:  Afebrile.  Blood cultures with no growth to date.  White blood count is trending down.TTE negative.  Continue IV antibiotic.  Patient will need 4 weeks of IV antibiotic through 06/03.  PICC line is contraindicated in the setting of advanced CKD and questionable need for dialysis.  to come to [facility] office for IVAB. Blood pressure has been in 140s 150s.  Switch midodrine 5 mg to as needed if MAP less than 5 monitor urine output. Nephrology on board.  Kidney function stable today.  Decent urine output.  No plan start hemodialysis yet.  Hopefully his kidneys will recover.  Continue to monitor over the weekend. Oncology on board.  Hold further therapy in the setting of acute infection and ARACELI.  Continue breathing treatment.     I tried to call the patient's son (Gamaliel) 05/08 with updates.  It went to voicemail.  I discussed the plan with the significant other today at bedside.  Questions answered.        Expected  Discharge Location and Transportation: Likely home on Monday.  Expected Discharge   Expected Discharge Date: 5/13/2024; Expected Discharge Time:      DVT prophylaxis:  Medical DVT prophylaxis orders are present.         AM-PAC 6 Clicks Score (PT): 24 (05/09/24 2000)    CODE STATUS:   Code Status and Medical Interventions:   Ordered at: 05/06/24 0331     Code Status (Patient has no pulse and is not breathing):    CPR (Attempt to Resuscitate)     Medical Interventions (Patient has pulse or is breathing):    Full Support       Kael Wiggins MD  05/10/24

## 2024-05-10 NOTE — PROGRESS NOTES
INFECTIOUS DISEASE Progress note    David Barfield  1950  [id number]    Date of consult: 5/6/24    Admit date: 5/5/2024    Requesting Provider: Abhishek Alan APRN   Evaluating physician: Derian Barry MD  Reason for Consultation: Recent treatment  Chief Complaint: Vague abdominal pain      Subjective   History of present illness:  David Barfield is a  74 y.o.  Yr old male with PMH NSCLC/RLL lobectomy (1/2024) recently on Opdivo (last dose on 4/4/24), HTN, and emphysema who I have followed during multiple recent admissions after he presented with vague abdominal pain, Nausea, vomiting, Diarrhea, severe leukocytosis with neutrophilia, and recent renal dysfunction.  During his initial admission he was thought to have acute pyelonephritis but urine culture was no growth and blood cultures were no growth.  He clinically responded to cefepime and received a 10 day course of antibiotic coverage and was discharged off of antibiotics after clinical improvement.  His renal function had improved significantly at the time of the discharge.  He subsequently presented back to [facility] last month with similar symptoms and his severe leukocytosis.  C. Difficile test and GI PCR were negative.  CT abdomen and pelvis showed improving stranding around his kidneys consistent with improving pyelonephritis.  Urine culture was negative and blood cultures were again negative. Karius test was negative (Although was antibiotic modified). TTE did not show vegetation.  He clinically improved on cefepime. He was subsequently discharged on Levaquin with plans for a one-week supply and was supposed to follow-up in my clinic today with repeat blood work.     The patient presented back to the hospital last night feeling extremely weak with a near syncopal episode, chills, back pain, nausea and vomiting, and one episode of diarrhea. The patient was found to be hypotensive and has been admitted to the ICU and is on pressors.  Lactic acid was elevated at 3.8 on arrival.  Pro-calcitonin was 1.89.  White blood cell count was elevated back to 33.46.  Creatinine was 2.8, up from 2.03 when he was discharged on 4/29. Respiratory PCR panel was negative.  Cortisol level was 26.23.  Urine Legionella and urine strep pneumo antigens were negative.  MRSA PCR nares is negative.  Blood cultures are in progress. CT chest shows stable small loculated right-sided pleural effusion with stable postoperative and chronic changes in both lungs.  CT abdomen and pelvis showed markedly bilateral perinephric stranding and fluid without hydronephrosis.  Appearance is nonspecific per radiology but could be related to pyelonephritis. The patient was initially given vancomycin and Zosyn in the ER and then has been switched to cefepime 2 g IV every 24 hours.  He remains in the ICU.  ID has been consultative for recommendations regarding the patient's recurrent sepsis and pyelonephritis.    Subjective:    5/7/24: The patient is feeling somewhat better.  Nausea and vomiting has improved.  Creatinine is worse at 5.11.  GI PCR panel was negative.  White blood cell count is slightly better at 28.99.  No severe diarrhea.  Having vague abdominal pain but improved.  No new rashes reported.  No chest pain or shortness of breath.  No fevers.    5/8/24: The patient continues to feel better.  Urine output is improving.  He remains afebrile.  Having some slight nausea but no emesis.  Not eating much.  No severe diarrhea. White blood cell count trended down to 16.54.  Creatinine has increased to 6.37.    5/9/24: The patient is feeling worse today.  He keeps rubbing his face per his girlfriend who is at bedside.  He is very fatigued.  Having some nausea and did have one episode of emesis over the last 24 hours.  No severe watery diarrhea. No fevers. White blood cell count has trended down to 11.78.  Creatinine is worse at 7.72.  He states that he is urinating a lot.    5/10/24: The  patient is feeling somewhat better today.  Less nausea and no emesis.  Still fatigued but no worse.  No severe abdominal pain.  No diarrhea.  He denies any new rashes.  Urine output has increased. Creatinine has decreased slightly to 7.64.  White blood cell count has improved to 10.73.     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    Coronary artery disease 2019    Diabetes mellitus Borderline    Emphysema/COPD     Erectile disorder     GERD (gastroesophageal reflux disease)     History of chemotherapy     Hyperlipidemia     Hypertension 2019    Lung nodule     Mumps     Mumps     Pruritus     after bath    Slow to wake up after anesthesia     Wears dentures     upper only    Wears hearing aid in both ears     usually only wears right       Past Surgical History:   Procedure Laterality Date    BONE BIOPSY      broken bone surgery in his face    BRONCHOSCOPY THORACOTOMY Right 01/09/2024    Procedure: THORACOTOMY FOR LOWER LOBECTOMY AND MEDISTINAL LYMPH NODE DISSECTION RIGHT;  Surgeon: Joey Patel MD;  Location:  [facility];  Service: Cardiothoracic;  Laterality: Right;    BRONCHOSCOPY WITH ION ROBOTIC ASSIST N/A 09/15/2023    Procedure: BRONCHOSCOPY NAVIGATION WITH ENDOBRONCHIAL ULTRASOUND AND ION ROBOT;  Surgeon: Octaviano Sampson MD;  Location:  [facility];  Service: Robotics - Pulmonary;  Laterality: N/A;  ion #[id number] - [id number]  - 0015  Cath guide 0061    EBUS balloon removed and intact    CARDIAC ELECTROPHYSIOLOGY PROCEDURE N/A 08/17/2021    Procedure: Pacemaker DC new;  Surgeon: Kayy Box MD;  Location:  [facility];  Service: Cardiology;  Laterality: N/A;    FACIAL FRACTURE SURGERY      LYMPH NODE BIOPSY  2023    PACEMAKER IMPLANTATION         Pediatric History   Patient Parents    Not on file     Other Topics Concern    Not on file   Social History Narrative     "Lives in [place], [place]       family history includes Aneurysm in his mother; Cancer in his sister; Dementia in his father; Heart disease in his paternal grandmother; Hypertension in his paternal grandfather; Leukemia in his sister.    Allergies   Allergen Reactions    Cymbalta [Duloxetine Hcl] GI Intolerance    Gabapentin Mental Status Change     Pt states that this medication \"makes him feel foolish in his head\".     Remeron [Mirtazapine] Other (See Comments)     Excess sedation    Toradol [Ketorolac Tromethamine] GI Intolerance     Projectile vomiting     Latex Other (See Comments)     Latex allergy     Tape Rash       Immunization History   Administered Date(s) Administered    ABRYSVO (RSV, 60+ or pregnant women 32-36 wks) 10/16/2023    COVID-19 (PFIZER) BIVALENT 12+YRS 09/16/2022    COVID-19 (PFIZER) Purple Cap Monovalent 01/29/2021, 02/19/2021, 10/18/2021, 04/14/2022    COVID-19 F23 (PFIZER) 12YRS+ (COMIRNATY) 09/26/2023    Fluzone High-Dose 65+yrs 10/06/2023    Pneumococcal Conjugate 20-Valent (PCV20) 10/11/2023       Medication:    Current Facility-Administered Medications:     acetaminophen (TYLENOL) tablet 650 mg, 650 mg, Oral, Q4H PRN, 650 mg at 05/07/24 1138 **OR** acetaminophen (TYLENOL) suppository 650 mg, 650 mg, Rectal, Q4H PRN, Soledad Colmenares MD    albuterol (PROVENTIL) nebulizer solution 0.083% 2.5 mg/3mL, 2.5 mg, Nebulization, Q6H PRN, Soledad Colmenares MD    sennosides-docusate (PERICOLACE) 8.6-50 MG per tablet 2 tablet, 2 tablet, Oral, BID, 2 tablet at 05/09/24 2101 **AND** polyethylene glycol (MIRALAX) packet 17 g, 17 g, Oral, Daily PRN **AND** bisacodyl (DULCOLAX) EC tablet 5 mg, 5 mg, Oral, Daily PRN **AND** bisacodyl (DULCOLAX) suppository 10 mg, 10 mg, Rectal, Daily PRN, Soledad Colmenares MD    budesonide-formoterol (SYMBICORT) 160-4.5 MCG/ACT inhaler 2 puff, 2 puff, Inhalation, BID - RT, 2 puff at 05/10/24 0814 **AND** tiotropium (SPIRIVA RESPIMAT) 2.5 mcg/act aerosol " solution inhaler, 2 puff, Inhalation, Daily - RT, Soledad Colmenares MD, 2 puff at 05/10/24 0814    cefepime 2000 mg IVPB in 100 mL NS (MBP), 2,000 mg, Intravenous, Q24H, Derian Barry MD, 2,000 mg at 05/10/24 0843    famotidine (PEPCID) tablet 20 mg, 20 mg, Oral, Daily, Soledad Colmenares MD, 20 mg at 05/10/24 1004    heparin (porcine) 5000 UNIT/ML injection 5,000 Units, 5,000 Units, Subcutaneous, Q12H, Soledad Colmenares MD, 5,000 Units at 05/10/24 1005    HYDROcodone-acetaminophen (NORCO) 7.5-325 MG per tablet 1 tablet, 1 tablet, Oral, Q6H PRN, Soledad Colmenares MD    Lidocaine 4 % 1 patch, 1 patch, Transdermal, Q24H, Kael Wiggins MD, 1 patch at 05/10/24 1113    midodrine (PROAMATINE) tablet 5 mg, 5 mg, Oral, Q8H PRN, Kael Wiggins MD    nitroglycerin (NITROSTAT) SL tablet 0.4 mg, 0.4 mg, Sublingual, Q5 Min PRN, Soledad Colmenares MD    ondansetron ODT (ZOFRAN-ODT) disintegrating tablet 4 mg, 4 mg, Oral, Q6H PRN, 4 mg at 05/08/24 1333 **OR** ondansetron (ZOFRAN) injection 4 mg, 4 mg, Intravenous, Q6H PRN, Kael Wiggins MD    sodium bicarbonate 8.4 % 150 mEq in dextrose (D5W) 5 % 1,000 mL infusion (greater than 100 mEq), 150 mEq, Intravenous, Continuous, Alok Dixon MD, Last Rate: 50 mL/hr at 05/10/24 1831, 150 mEq at 05/10/24 1831    sodium chloride 0.9 % flush 10 mL, 10 mL, Intravenous, PRN, Soledad Colmenares MD    sodium chloride 0.9 % flush 10 mL, 10 mL, Intravenous, Q12H, Soledad Colmenares MD, 10 mL at 05/10/24 0904    sodium chloride 0.9 % infusion 40 mL, 40 mL, Intravenous, PRN, Soledad Colmenares MD    Please refer to the medical record for a full medication list    Review of Systems:    As above    Physical Exam:   Vital Signs   Temp:  [96.8 °F (36 °C)-97.8 °F (36.6 °C)] 97.4 °F (36.3 °C)  Heart Rate:  [70-71] 71  Resp:  [18] 18  BP: (144-169)/(80-96) 152/83    Temp  Min: 96.8 °F (36 °C)  Max: 97.8 °F (36.6 °C)  BP  Min: 144/80  Max: 169/96  Pulse   "Min: 70  Max: 71  Resp  Min: 18  Max: 18  SpO2  Min: 95 %  Max: 97 %    Blood pressure 152/83, pulse 71, temperature 97.4 °F (36.3 °C), temperature source Oral, resp. rate 18, height 182.9 cm (72\"), weight 79.1 kg (174 lb 4.8 oz), SpO2 95%.  GENERAL: Awake and alert, No acute distress.  Sitting up in bed.  Less ill-appearing.  HEENT:  Normocephalic, atraumatic.  No external oral lesions noted  EYES:  No conjunctival injection. No icterus.   HEART: RRR, no murmur  LUNGS: CTA B.  Nonlabored breathing on room air.  ABDOMEN: Soft, nontender, nondistended. No appreciable HSM.    GENITAL: no Yeung catheter  SKIN: No generalized rashes noted  PSYCHIATRIC: cooperative.  Appropriate mood and affect  EXT:  No cellulitic change.  NEURO: awake alert and oriented ×4.  Normal speech and cognition    AICD pocket site is without erythema or tenderness    Mediport site is without erythema     Results Review:   I reviewed the patient's new clinical results.  I reviewed the patient's new imaging results and agree with the interpretation.  I reviewed the patient's other test results and agree with the interpretation    Results from last 7 days   Lab Units 05/10/24  0324 05/09/24  0340 05/08/24  0413   WBC 10*3/mm3 10.73 11.78* 16.54*   HEMOGLOBIN g/dL 8.7* 8.4* 9.1*   HEMATOCRIT % 25.9* 25.5* 27.9*   PLATELETS 10*3/mm3 192 199 202     Results from last 7 days   Lab Units 05/10/24  0324   SODIUM mmol/L 133*   POTASSIUM mmol/L 4.2   CHLORIDE mmol/L 92*   CO2 mmol/L 24.0   BUN mg/dL 49*   CREATININE mg/dL 7.64*   GLUCOSE mg/dL 108*   CALCIUM mg/dL 8.4*     Results from last 7 days   Lab Units 05/07/24  0502   ALK PHOS U/L 72   BILIRUBIN mg/dL 0.3   ALT (SGPT) U/L 13   AST (SGOT) U/L 18                 Results from last 7 days   Lab Units 05/06/24  1152   LACTATE mmol/L 1.7     Estimated Creatinine Clearance: 9.3 mL/min (A) (by C-G formula based on SCr of 7.64 mg/dL (H)).  CPK          5/6/2024    02:07   Common Labs   Creatine Kinase 12  " "     Procalitonin Results:      Lab 05/05/24  2352   PROCALCITONIN 1.89*      Brief Urine Lab Results  (Last result in the past 365 days)        Color   Clarity   Blood   Leuk Est   Nitrite   Protein   CREAT   Urine HCG        05/06/24 1206             60.0         05/06/24 1206 Yellow   Cloudy   Trace   Small (1+)   Negative   >=300 mg/dL (3+)                  No results found for: \"SITE\", \"ALLENTEST\", \"PHART\", \"REK1UIY\", \"PO2ART\", \"WGF6RQX\", \"BASEEXCESS\", \"V3WLVQGK\", \"HGBBG\", \"HCTABG\", \"OXYHEMOGLOBI\", \"METHHGBN\", \"CARBOXYHGB\", \"CO2CT\", \"BAROMETRIC\", \"MODALITY\", \"FIO2\"     Microbiology:  5/6 blood culture ×2: No growth to date    Urine culture: No growth final    Radiology:  Imaging Results (Last 72 Hours)       ** No results found for the last 72 hours. **            IMPRESSION:     Problems:  Recurrent sepsis, now presenting with septic shock with severe leukocytosis with neutrophilia, elevated procalcitonin level, lactic acidosis, hypotension with pressor requirement. Could be due to pyelonephritis that is noted on imaging but no positive culture data today.  Multiple urine cultures have been sent although may have been antibiotic modified.  Blood cultures have been no growth. Karius test from last admission was negative although was modified by antibiotics. He is seemingly responded well to cefepime.  He had recurrence on oral Levaquin.  May need to give him a longer course of empiric cefepime at time of discharge. Seems to be improving again on cefepime. He has a very unusual case but this does seem to be an infectious etiology/bacterial infection that is responding to cefepime but he quickly declined after stopping cefepime each time even after his long is a 10 day course. Seems to be improving with cefepime again.  Leukocytosis has improved.  No fevers.  Hypotension has improved.  Bilateral pyelonephritis-present on imaging and some symptoms consistent with pyelonephritis but no culprit identified on " culture.  No hydronephrosis or renal stone.  Unusual presentation in immunocompromised host. Urine culture was again no growth.  Acute renal failure-Creatinine and urine output are improving.  Nausea and vomiting-overall improved but is having some slight nausea   Diarrhea-Resolved.  Per his girlfriend who is at bedside she states that he had more of incontinence prior to arrival due to altered mental status and was not severe watery diarrhea.  Stable small loculated right-sided pleural effusion-likely postoperative after lobectomy.  No shortness of breath.  I discussed this with Dr. Colmenares and she does not think that this is the source for his infection. I agree with this.  NSCLC/RLL lobectomy in January 2024, recently on Opdivo (last dose on 4/4/24)  Emphysema      RECOMMENDATIONS:    -Closely follow CBC and CMP  -Check Brucella serologies.  Although this seems unlikely given his lack of exposures by history (no history of consuming unpasteurized milk products), Brucella can cause pyelonephritis and GI symptoms and could potentially respond partially to antibiotic therapy.  Also may not show up in cultures and carious test that was sent previously was modified by antibiotics.  -Follow pending blood cultures-No growth to date  -DAMIÁN without signs of infective endocarditis  -Agree with cefepime 2 g IV every 24 hours. May need to give a longer course of IV antibiotic therapy.  Could consider a four-week course empirically if no new source identified  -Continue to monitor renal function.  Nephrology is following.  Creatinine seems to have plateaued and urine output has increased.    I discussed in length of the patient and his girlfriend today    Tentative antibiotic plan at the time of discharge.    UM/XAVI:  Cefepime 2g IV q24h. Can infuse INPAT at [facility] through mediport if ok with oncology. Tentatively planning to continue this antibiotic for 4 weeks until 6/3/24.  Weekly CBC, CMP, CRP  Follow-up in my clinic  within 1 week of discharge  Please fax a copy of my orders to [facility] at [phone] and call [phone] with final discharge plans     Thank you for asking me to see David Barfield.      Complex MDM.     Derian Barry MD  5/10/2024

## 2024-05-10 NOTE — CASE MANAGEMENT/SOCIAL WORK
Continued Stay Note  [facility]     Patient Name: David Barfield  MRN: [id number]  Today's Date: 5/10/2024    Admit Date: 5/5/2024    Plan: home   Discharge Plan       Row Name 05/10/24 1556       Plan    Plan home    Patient/Family in Agreement with Plan yes    Plan Comments Met with Mr. Barfield and wife at the bedside to discuss discharge plan. Patient will be here through the weekend and anticipates discharge early next week. He will go home with wife, and she will transport. He will get IV abx at [facility] office.  will continue to follow plan of care and asisst with discharge planning  needs as indicated.    Final Discharge Disposition Code 01 - home or self-care                   Discharge Codes    No documentation.                 Expected Discharge Date and Time       Expected Discharge Date Expected Discharge Time    May 13, 2024               Kaylen Godoy RN

## 2024-05-10 NOTE — PROGRESS NOTES
"   LOS: 4 days    Patient Care Team:  Hayley Castellanos APRN as PCP - General (Nurse Practitioner)  Octaviano Sampson MD as Consulting Physician (Pulmonary Disease)  Neetu Ashley MD as Referring Physician (Hematology and Oncology)  Nimo Rodríguez MD as Consulting Physician (Radiation Oncology)    Subjective   Chart reviewed.  Other physician notes seen.  Good urine output.  Creatinine unchanged from yesterday.  High risk complex patient with multiple medical problems.  Discussed with family, patient and the physician.  No dialysis for today.  Review of system:  Denies any nausea vomiting chest pain shortness of breath no dysuria or hematuria.    Objective     Vital Signs:  Blood pressure 146/94, pulse 70, temperature 97 °F (36.1 °C), temperature source Axillary, resp. rate 18, height 182.9 cm (72\"), weight 79.1 kg (174 lb 4.8 oz), SpO2 97%.      Intake/Output Summary (Last 24 hours) at 5/10/2024 1046  Last data filed at 5/10/2024 1015  Gross per 24 hour   Intake 1607.5 ml   Output 3000 ml   Net -1392.5 ml        05/09 0701 - 05/10 0700  In: 1607.5 [I.V.:1507.5]  Out: 3400 [Urine:3400]    Physical Exam:  General Appearance: Alert, oriented, no obvious distress.  Eyes: PER, EOMI.  Neck: Supple no JVD.  Lungs: Clear auscultation, no rales rhonchi's, equal chest movement, nonlabored.  Heart: No gallop, murmur, rub, RRR.  Abdomen: Soft, nontender, positive bowel sounds, no organomegaly.  Extremities: No edema, no cyanosis.  Neuro: No focal deficit, moving all extremities, alert oriented X 3   no Yeung catheter.     Labs:  Results from last 7 days   Lab Units 05/10/24  0324 05/09/24  0340 05/08/24  0413   WBC 10*3/mm3 10.73 11.78* 16.54*   HEMOGLOBIN g/dL 8.7* 8.4* 9.1*   PLATELETS 10*3/mm3 192 199 202     Results from last 7 days   Lab Units 05/10/24  0324 05/09/24  0340 05/08/24  0413 05/07/24  0502 05/06/24  0035 05/05/24  2352 05/03/24  1632   SODIUM mmol/L 133* 132* 128* 131*   < > 141 138   POTASSIUM " mmol/L 4.2 4.1 4.5 4.5   < > 4.1 3.9   CHLORIDE mmol/L 92* 98 97* 99   < > 106 102   CO2 mmol/L 24.0 20.0* 16.0* 17.0*   < > 20.0* 21.7*   BUN mg/dL 49* 51* 47* 36*   < > 23 18   CREATININE mg/dL 7.64* 7.72* 6.37* 5.11*   < > 2.80* 2.22*   CALCIUM mg/dL 8.4* 8.7 8.4* 8.2*   < > 8.7 10.0   PHOSPHORUS mg/dL 5.9* 5.0* 4.4 3.1  --   --   --    MAGNESIUM mg/dL  --  2.6*  --  2.3  --  1.4* 2.0   ALBUMIN g/dL 2.7* 2.8* 2.5* 2.7*  --  3.2* 4.1    < > = values in this interval not displayed.     Results from last 7 days   Lab Units 05/07/24  0502   ALK PHOS U/L 72   BILIRUBIN mg/dL 0.3   ALT (SGPT) U/L 13   AST (SGOT) U/L 18             Estimated Creatinine Clearance: 9.3 mL/min (A) (by C-G formula based on SCr of 7.64 mg/dL (H)).         A/P:  1.  ARF: Creatinine continues to rise.  Urine output oliguric.  Patient with recurrent ARF with improvement to 2.0 on last discharge..  Current injury likely due to recurrence of ATN with hemodynamic instability.  Patient with new findings of proteinuria.  Hopefully will see signs of recovery soon with improved hemodynamics.  Good urine output with stabilization of creatinine, will monitor closely no dialysis today.     2.  Sepsis: Resolving.  Off pressors.  Blood pressure increased today on midodrine.  Will decrease midodrine to 5 mg 3 times daily.  Hold if blood pressure remains elevated.    3.  Proteinuria: Patient with 8.8 g proteinuria on ratio.  Previously patient had a microalbumin of 9.  SPEP negative in the past.  Patient with recurrent acute renal failure and perinephric stranding previously thought due to pyelonephritis.  Will monitor    4.  Hyponatremia: Stable.    5.  Metabolic acidosis: IV bicarb at 75 MLS per hour.  Will decrease to 50 mL/h     6.  Volume: No edema.  Chest x-ray stable.  Good oxygenation.  Strict I's and O's.     7.  ID:  Patient with sudden onset of chills and profound hypotension.  Required pressors and volume for blood pressure stabilization.   Developed significant leukocytosis with white count in the 40K range.  Patient has been able to wean off pressors.  Leukocytosis improving.  Despite multiple episodes and hospitalizations, underlying etiology remains unknown. Was on Levaquin as outpatient.  CT scan with possible pyelonephritis but cultures have been negative previously.  ID continues to evaluate.     8.  GI upset: Has been recurrent over the past month with recurrent admissions.  Denies any symptoms at this time.     8.  History of small cell lung cancer: Post immuno therapy with Opdivo.  Last infusion 4/4/2024.  Follows with Dr. Ashley.     High risk complex patient with multiple medical problems.  Good urine output.  Creatinine plateaued today.  Case was discussed with Dr. Oli Wiggins.  Will monitor renal function at this time.       Alok Dixon MD  05/10/24  10:46 EDT

## 2024-05-10 NOTE — PLAN OF CARE
Problem: Adult Inpatient Plan of Care  Goal: Absence of Hospital-Acquired Illness or Injury  Outcome: Ongoing, Progressing  Intervention: Identify and Manage Fall Risk  Recent Flowsheet Documentation  Taken 5/10/2024 0200 by Khushboo Canada RN  Safety Promotion/Fall Prevention:   activity supervised   assistive device/personal items within reach   clutter free environment maintained   fall prevention program maintained   nonskid shoes/slippers when out of bed   safety round/check completed  Taken 5/10/2024 0000 by Khushboo Canada RN  Safety Promotion/Fall Prevention:   activity supervised   assistive device/personal items within reach   clutter free environment maintained   fall prevention program maintained   safety round/check completed  Taken 5/9/2024 2000 by Khushboo Canada RN  Safety Promotion/Fall Prevention:   activity supervised   assistive device/personal items within reach   clutter free environment maintained   fall prevention program maintained   nonskid shoes/slippers when out of bed   safety round/check completed  Intervention: Prevent Skin Injury  Recent Flowsheet Documentation  Taken 5/10/2024 0200 by Khushboo Canada RN  Body Position: position changed independently  Skin Protection:   adhesive use limited   incontinence pads utilized   transparent dressing maintained   tubing/devices free from skin contact  Taken 5/10/2024 0000 by Khushboo Canada RN  Body Position: position changed independently  Skin Protection:   adhesive use limited   incontinence pads utilized   transparent dressing maintained   tubing/devices free from skin contact  Taken 5/9/2024 2000 by Khushboo Canada RN  Body Position: position changed independently  Skin Protection:   adhesive use limited   drying agents applied   incontinence pads utilized   transparent dressing maintained   tubing/devices free from skin contact  Intervention: Prevent and Manage VTE (Venous Thromboembolism) Risk  Recent Flowsheet Documentation  Taken  5/10/2024 0200 by Khushboo Canada RN  Activity Management: activity minimized  Taken 5/10/2024 0000 by Khushboo Canada RN  Activity Management: activity minimized  Taken 5/9/2024 2000 by Khushboo Canada RN  Activity Management: activity encouraged  VTE Prevention/Management: (see MAR) other (see comments)  Range of Motion: active ROM (range of motion) encouraged  Intervention: Prevent Infection  Recent Flowsheet Documentation  Taken 5/10/2024 0200 by Khushboo Canada RN  Infection Prevention: rest/sleep promoted  Taken 5/10/2024 0000 by Khushboo Canada RN  Infection Prevention: rest/sleep promoted  Taken 5/9/2024 2000 by Khushboo Canada RN  Infection Prevention:   cohorting utilized   environmental surveillance performed   rest/sleep promoted   Goal Outcome Evaluation:  Plan of Care Reviewed With: patient        Progress: improving

## 2024-05-10 NOTE — PROGRESS NOTES
"HEMATOLOGY/ONCOLOGY PROGRESS NOTE    S: He is feeling better. He has been anxious to go home. His significant other has questions regarding his IV fluids. Yesterday after returning from his sedation procedure that he was staring/seemed drowsy. She had noticed he does this sometimes while watching tv at home.      She endorses desire to proceed with \"the biopsy the radiologist recommended on the supraclavicular lymph node\"    She has questions regarding home health vs infusion center antibiotics.     Medications:  The current medication list was reviewed in the EMR    ALLERGIES:    Allergies   Allergen Reactions    Cymbalta [Duloxetine Hcl] GI Intolerance    Gabapentin Mental Status Change     Pt states that this medication \"makes him feel foolish in his head\".     Remeron [Mirtazapine] Other (See Comments)     Excess sedation    Toradol [Ketorolac Tromethamine] GI Intolerance     Projectile vomiting     Latex Other (See Comments)     Latex allergy     Tape Rash         Physical Exam    VITAL SIGNS:  /94 (BP Location: Right arm, Patient Position: Lying)   Pulse 70   Temp 97 °F (36.1 °C) (Axillary)   Resp 18   Ht 182.9 cm (72\")   Wt 79.1 kg (174 lb 4.8 oz)   SpO2 97%   BMI 23.64 kg/m²   Temp:  [96.3 °F (35.7 °C)-97 °F (36.1 °C)] 97 °F (36.1 °C)      Performance Status: 1                Physical Exam  Constitutional:  Well developed, Well nourished, No acute distress.    HENT:  Normocephalic, Atraumatic.  Eyes: Conjunctivae normal.  Sclerae anicteric  CV RRR  Chest CTAB  Musculoskeletal: No peripheral edema.  Skin:  Warm, Dry, No erythema, No rash.   Port accessed       RECENT LABS:    Lab Results   Component Value Date    HGB 8.7 (L) 05/10/2024    HCT 25.9 (L) 05/10/2024    MCV 87.2 05/10/2024     05/10/2024    WBC 10.73 05/10/2024    NEUTROABS 7.90 (H) 05/10/2024    LYMPHSABS 1.12 05/10/2024    MONOSABS 1.45 (H) 05/10/2024    EOSABS 0.17 05/10/2024    BASOSABS 0.05 05/10/2024       Lab Results "   Component Value Date    GLUCOSE 108 (H) 05/10/2024    BUN 49 (H) 05/10/2024    CREATININE 7.64 (H) 05/10/2024     (L) 05/10/2024    K 4.2 05/10/2024    CL 92 (L) 05/10/2024    CO2 24.0 05/10/2024    CALCIUM 8.4 (L) 05/10/2024    PROTEINTOT 6.1 05/07/2024    ALBUMIN 2.7 (L) 05/10/2024    BILITOT 0.3 05/07/2024    ALKPHOS 72 05/07/2024    AST 18 05/07/2024    ALT 13 05/07/2024     Assessment/Plan  Recurrent sepsis with severe leukocytosis and pyelonephritis with negative culture  Indwelling port  -ID notes and DAMIÁN reviewed.  -DAMIÁN planned  -Note ID plan for prolonged IV antibiotics via port  -Discussed with patient and significant other that it is okay from my standpoint to utilize the port, but I would prefer that port needle access/exchange is performed at hospital or infusion center rather than through home health.    3.  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  -I again reviewed the March PET/CT as well as radiology recommendations with the patient and his significant other.  -We discussed that the degree of FDG uptake was less than 5 and very discordant from his original tumor which is highly FDG avid.  We reviewed the radiology impression that this was most likely reactive and recommendation per the radiologist was for serial observation but that the 1 cm lymph node with an SUV of 3 was potentially amenable to ultrasound-guided biopsy if indicated clinically.  Given that the level of SUV uptake was favored to be reactive and given no substantially enlarged lymph node, my recommendation is for serial observation.  I did personally review his noncontrast chest CT from May 6 and did not notice any progressive adenopathy in this region.  Further, we discussed that given his current clinical condition and recurrent infections even if a recurrence were identified he would need to be clinically  stabilized prior to be a candidate for further cancer directed therapy.  They ask about oral antineoplastics.  We have previously checked [employer] testing and he does not harbor an ALK or EGFR mutation for which an oral adjuvant therapy option exists.  Serial imaging is planned for surveillance.  He will follow-up after resolution of acute issues in the outpatient oncology office.       4.  ARACELI in the context of bilateral pyelonephritis and recurrent hypotension  Interval nephrology notes reviewed.    Neetu Ashley MD  [facility]    5/10/2024     Time spent 55 minutes including record review, review of current and prior imaging and testing, history, exam, counseling and documentation

## 2024-05-11 ENCOUNTER — APPOINTMENT (OUTPATIENT)
Dept: GENERAL RADIOLOGY | Facility: HOSPITAL | Age: 74
DRG: 871 | End: 2024-05-11
Payer: MEDICARE

## 2024-05-11 LAB
ALBUMIN SERPL-MCNC: 3.1 G/DL (ref 3.5–5.2)
ANION GAP SERPL CALCULATED.3IONS-SCNC: 14 MMOL/L (ref 5–15)
BACTERIA SPEC AEROBE CULT: NORMAL
BACTERIA SPEC AEROBE CULT: NORMAL
BASOPHILS # BLD AUTO: 0.06 10*3/MM3 (ref 0–0.2)
BASOPHILS NFR BLD AUTO: 0.5 % (ref 0–1.5)
BUN SERPL-MCNC: 44 MG/DL (ref 8–23)
BUN/CREAT SERPL: 6.2 (ref 7–25)
CALCIUM SPEC-SCNC: 8.6 MG/DL (ref 8.6–10.5)
CHLORIDE SERPL-SCNC: 95 MMOL/L (ref 98–107)
CO2 SERPL-SCNC: 29 MMOL/L (ref 22–29)
CREAT SERPL-MCNC: 7.08 MG/DL (ref 0.76–1.27)
DEPRECATED RDW RBC AUTO: 52.5 FL (ref 37–54)
EGFRCR SERPLBLD CKD-EPI 2021: 7.5 ML/MIN/1.73
EOSINOPHIL # BLD AUTO: 0.17 10*3/MM3 (ref 0–0.4)
EOSINOPHIL NFR BLD AUTO: 1.5 % (ref 0.3–6.2)
ERYTHROCYTE [DISTWIDTH] IN BLOOD BY AUTOMATED COUNT: 16.3 % (ref 12.3–15.4)
GLUCOSE SERPL-MCNC: 116 MG/DL (ref 65–99)
HCT VFR BLD AUTO: 27.8 % (ref 37.5–51)
HGB BLD-MCNC: 9.2 G/DL (ref 13–17.7)
IMM GRANULOCYTES # BLD AUTO: 0.09 10*3/MM3 (ref 0–0.05)
IMM GRANULOCYTES NFR BLD AUTO: 0.8 % (ref 0–0.5)
LYMPHOCYTES # BLD AUTO: 1.41 10*3/MM3 (ref 0.7–3.1)
LYMPHOCYTES NFR BLD AUTO: 12.7 % (ref 19.6–45.3)
MCH RBC QN AUTO: 29.2 PG (ref 26.6–33)
MCHC RBC AUTO-ENTMCNC: 33.1 G/DL (ref 31.5–35.7)
MCV RBC AUTO: 88.3 FL (ref 79–97)
MONOCYTES # BLD AUTO: 1.7 10*3/MM3 (ref 0.1–0.9)
MONOCYTES NFR BLD AUTO: 15.4 % (ref 5–12)
NEUTROPHILS NFR BLD AUTO: 69.1 % (ref 42.7–76)
NEUTROPHILS NFR BLD AUTO: 7.64 10*3/MM3 (ref 1.7–7)
NRBC BLD AUTO-RTO: 0 /100 WBC (ref 0–0.2)
PHOSPHATE SERPL-MCNC: 5.1 MG/DL (ref 2.5–4.5)
PLATELET # BLD AUTO: 216 10*3/MM3 (ref 140–450)
PMV BLD AUTO: 9.8 FL (ref 6–12)
POTASSIUM SERPL-SCNC: 3.7 MMOL/L (ref 3.5–5.2)
RBC # BLD AUTO: 3.15 10*6/MM3 (ref 4.14–5.8)
SODIUM SERPL-SCNC: 138 MMOL/L (ref 136–145)
WBC NRBC COR # BLD AUTO: 11.07 10*3/MM3 (ref 3.4–10.8)

## 2024-05-11 PROCEDURE — 94799 UNLISTED PULMONARY SVC/PX: CPT

## 2024-05-11 PROCEDURE — 80069 RENAL FUNCTION PANEL: CPT | Performed by: INTERNAL MEDICINE

## 2024-05-11 PROCEDURE — 25010000002 HEPARIN (PORCINE) PER 1000 UNITS: Performed by: INTERNAL MEDICINE

## 2024-05-11 PROCEDURE — 85025 COMPLETE CBC W/AUTO DIFF WBC: CPT | Performed by: INTERNAL MEDICINE

## 2024-05-11 PROCEDURE — 71045 X-RAY EXAM CHEST 1 VIEW: CPT

## 2024-05-11 PROCEDURE — 25010000002 CEFEPIME PER 500 MG: Performed by: INTERNAL MEDICINE

## 2024-05-11 PROCEDURE — 94761 N-INVAS EAR/PLS OXIMETRY MLT: CPT

## 2024-05-11 PROCEDURE — 99232 SBSQ HOSP IP/OBS MODERATE 35: CPT | Performed by: HOSPITALIST

## 2024-05-11 PROCEDURE — 94664 DEMO&/EVAL PT USE INHALER: CPT

## 2024-05-11 RX ADMIN — BUDESONIDE AND FORMOTEROL FUMARATE DIHYDRATE 2 PUFF: 160; 4.5 AEROSOL RESPIRATORY (INHALATION) at 20:17

## 2024-05-11 RX ADMIN — TIOTROPIUM BROMIDE INHALATION SPRAY 2 PUFF: 3.12 SPRAY, METERED RESPIRATORY (INHALATION) at 08:54

## 2024-05-11 RX ADMIN — BUDESONIDE AND FORMOTEROL FUMARATE DIHYDRATE 2 PUFF: 160; 4.5 AEROSOL RESPIRATORY (INHALATION) at 08:54

## 2024-05-11 RX ADMIN — ALBUTEROL SULFATE 2.5 MG: 2.5 SOLUTION RESPIRATORY (INHALATION) at 00:38

## 2024-05-11 RX ADMIN — CEFEPIME 2000 MG: 2 INJECTION, POWDER, FOR SOLUTION INTRAVENOUS at 08:50

## 2024-05-11 RX ADMIN — HEPARIN SODIUM 5000 UNITS: 5000 INJECTION INTRAVENOUS; SUBCUTANEOUS at 21:55

## 2024-05-11 RX ADMIN — SENNOSIDES AND DOCUSATE SODIUM 2 TABLET: 8.6; 5 TABLET ORAL at 21:55

## 2024-05-11 RX ADMIN — Medication 10 ML: at 21:55

## 2024-05-11 RX ADMIN — FAMOTIDINE 20 MG: 20 TABLET, FILM COATED ORAL at 08:51

## 2024-05-11 RX ADMIN — HEPARIN SODIUM 5000 UNITS: 5000 INJECTION INTRAVENOUS; SUBCUTANEOUS at 08:51

## 2024-05-11 NOTE — PROGRESS NOTES
INFECTIOUS DISEASE Progress note    David Barfield  1950  [id number]    Date of consult: 5/6/24    Admit date: 5/5/2024    Requesting Provider: Abhishek Alan APRN   Evaluating physician: Derian Barry MD  Reason for Consultation: Recent treatment  Chief Complaint: Vague abdominal pain      Subjective   History of present illness:  David Barfield is a  74 y.o.  Yr old male with PMH NSCLC/RLL lobectomy (1/2024) recently on Opdivo (last dose on 4/4/24), HTN, and emphysema who I have followed during multiple recent admissions after he presented with vague abdominal pain, Nausea, vomiting, Diarrhea, severe leukocytosis with neutrophilia, and recent renal dysfunction.  During his initial admission he was thought to have acute pyelonephritis but urine culture was no growth and blood cultures were no growth.  He clinically responded to cefepime and received a 10 day course of antibiotic coverage and was discharged off of antibiotics after clinical improvement.  His renal function had improved significantly at the time of the discharge.  He subsequently presented back to [facility] last month with similar symptoms and his severe leukocytosis.  C. Difficile test and GI PCR were negative.  CT abdomen and pelvis showed improving stranding around his kidneys consistent with improving pyelonephritis.  Urine culture was negative and blood cultures were again negative. Karius test was negative (Although was antibiotic modified). TTE did not show vegetation.  He clinically improved on cefepime. He was subsequently discharged on Levaquin with plans for a one-week supply and was supposed to follow-up in my clinic today with repeat blood work.     The patient presented back to the hospital last night feeling extremely weak with a near syncopal episode, chills, back pain, nausea and vomiting, and one episode of diarrhea. The patient was found to be hypotensive and has been admitted to the ICU and is on pressors.  Lactic acid was elevated at 3.8 on arrival.  Pro-calcitonin was 1.89.  White blood cell count was elevated back to 33.46.  Creatinine was 2.8, up from 2.03 when he was discharged on 4/29. Respiratory PCR panel was negative.  Cortisol level was 26.23.  Urine Legionella and urine strep pneumo antigens were negative.  MRSA PCR nares is negative.  Blood cultures are in progress. CT chest shows stable small loculated right-sided pleural effusion with stable postoperative and chronic changes in both lungs.  CT abdomen and pelvis showed markedly bilateral perinephric stranding and fluid without hydronephrosis.  Appearance is nonspecific per radiology but could be related to pyelonephritis. The patient was initially given vancomycin and Zosyn in the ER and then has been switched to cefepime 2 g IV every 24 hours.  He remains in the ICU.  ID has been consultative for recommendations regarding the patient's recurrent sepsis and pyelonephritis.    Subjective:    5/7/24: The patient is feeling somewhat better.  Nausea and vomiting has improved.  Creatinine is worse at 5.11.  GI PCR panel was negative.  White blood cell count is slightly better at 28.99.  No severe diarrhea.  Having vague abdominal pain but improved.  No new rashes reported.  No chest pain or shortness of breath.  No fevers.    5/8/24: The patient continues to feel better.  Urine output is improving.  He remains afebrile.  Having some slight nausea but no emesis.  Not eating much.  No severe diarrhea. White blood cell count trended down to 16.54.  Creatinine has increased to 6.37.    5/9/24: The patient is feeling worse today.  He keeps rubbing his face per his girlfriend who is at bedside.  He is very fatigued.  Having some nausea and did have one episode of emesis over the last 24 hours.  No severe watery diarrhea. No fevers. White blood cell count has trended down to 11.78.  Creatinine is worse at 7.72.  He states that he is urinating a lot.    5/10/24: The  patient is feeling somewhat better today.  Less nausea and no emesis.  Still fatigued but no worse.  No severe abdominal pain.  No diarrhea.  He denies any new rashes.  Urine output has increased. Creatinine has decreased slightly to 7.64.  White blood cell count has improved to 10.73.    5/11/24: The patient is feeling better today.  Less fatigue.  No nausea, vomiting, or diarrhea.  No fevers.  Having significant urine output. Did have some shortness of breath last night but now improved.  White blood cell count was 11.7 today.  Creatinine has improved to 7.08.     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    Coronary artery disease 2019    Diabetes mellitus Borderline    Emphysema/COPD     Erectile disorder     GERD (gastroesophageal reflux disease)     History of chemotherapy     Hyperlipidemia     Hypertension 2019    Lung nodule     Mumps     Mumps     Pruritus     after bath    Slow to wake up after anesthesia     Wears dentures     upper only    Wears hearing aid in both ears     usually only wears right       Past Surgical History:   Procedure Laterality Date    BONE BIOPSY      broken bone surgery in his face    BRONCHOSCOPY THORACOTOMY Right 01/09/2024    Procedure: THORACOTOMY FOR LOWER LOBECTOMY AND MEDISTINAL LYMPH NODE DISSECTION RIGHT;  Surgeon: Joey Patel MD;  Location: [facility] OR;  Service: Cardiothoracic;  Laterality: Right;    BRONCHOSCOPY WITH ION ROBOTIC ASSIST N/A 09/15/2023    Procedure: BRONCHOSCOPY NAVIGATION WITH ENDOBRONCHIAL ULTRASOUND AND ION ROBOT;  Surgeon: Octaviano Sampson MD;  Location: [facility] ENDOSCOPY;  Service: Robotics - Pulmonary;  Laterality: N/A;  ion #[id number] - [id number]  - 0015  Cath guide 0061    EBUS balloon removed and intact    CARDIAC ELECTROPHYSIOLOGY PROCEDURE N/A 08/17/2021    Procedure: Pacemaker DC new;  Surgeon: Kayy Box MD;  Location:  "[facility] INVASIVE LOCATION;  Service: Cardiology;  Laterality: N/A;    FACIAL FRACTURE SURGERY      LYMPH NODE BIOPSY  2023    PACEMAKER IMPLANTATION         Pediatric History   Patient Parents    Not on file     Other Topics Concern    Not on file   Social History Narrative    Lives in [place], [place]       family history includes Aneurysm in his mother; Cancer in his sister; Dementia in his father; Heart disease in his paternal grandmother; Hypertension in his paternal grandfather; Leukemia in his sister.    Allergies   Allergen Reactions    Cymbalta [Duloxetine Hcl] GI Intolerance    Gabapentin Mental Status Change     Pt states that this medication \"makes him feel foolish in his head\".     Remeron [Mirtazapine] Other (See Comments)     Excess sedation    Toradol [Ketorolac Tromethamine] GI Intolerance     Projectile vomiting     Latex Other (See Comments)     Latex allergy     Tape Rash       Immunization History   Administered Date(s) Administered    ABRYSVO (RSV, 60+ or pregnant women 32-36 wks) 10/16/2023    COVID-19 (PFIZER) BIVALENT 12+YRS 09/16/2022    COVID-19 (PFIZER) Purple Cap Monovalent 01/29/2021, 02/19/2021, 10/18/2021, 04/14/2022    COVID-19 F23 (PFIZER) 12YRS+ (COMIRNATY) 09/26/2023    Fluzone High-Dose 65+yrs 10/06/2023    Pneumococcal Conjugate 20-Valent (PCV20) 10/11/2023       Medication:    Current Facility-Administered Medications:     acetaminophen (TYLENOL) tablet 650 mg, 650 mg, Oral, Q4H PRN, 650 mg at 05/07/24 1138 **OR** acetaminophen (TYLENOL) suppository 650 mg, 650 mg, Rectal, Q4H PRN, Soledad Colmenares MD    albuterol (PROVENTIL) nebulizer solution 0.083% 2.5 mg/3mL, 2.5 mg, Nebulization, Q6H PRN, Soledad Colmenares MD, 2.5 mg at 05/11/24 0038    sennosides-docusate (PERICOLACE) 8.6-50 MG per tablet 2 tablet, 2 tablet, Oral, BID, 2 tablet at 05/10/24 2038 **AND** polyethylene glycol (MIRALAX) packet 17 g, 17 g, Oral, Daily PRN **AND** bisacodyl (DULCOLAX) EC tablet 5 mg, " 5 mg, Oral, Daily PRN **AND** bisacodyl (DULCOLAX) suppository 10 mg, 10 mg, Rectal, Daily PRN, Soledad Colmenares MD    budesonide-formoterol (SYMBICORT) 160-4.5 MCG/ACT inhaler 2 puff, 2 puff, Inhalation, BID - RT, 2 puff at 05/11/24 0854 **AND** tiotropium (SPIRIVA RESPIMAT) 2.5 mcg/act aerosol solution inhaler, 2 puff, Inhalation, Daily - RT, Soledad Colmenares MD, 2 puff at 05/11/24 0854    cefepime 2000 mg IVPB in 100 mL NS (MBP), 2,000 mg, Intravenous, Q24H, Derian Barry MD, 2,000 mg at 05/11/24 0850    famotidine (PEPCID) tablet 20 mg, 20 mg, Oral, Daily, Soledad Colmenares MD, 20 mg at 05/11/24 0851    heparin (porcine) 5000 UNIT/ML injection 5,000 Units, 5,000 Units, Subcutaneous, Q12H, Soledad Colmenares MD, 5,000 Units at 05/11/24 0851    Lidocaine 4 % 1 patch, 1 patch, Transdermal, Q24H, Kael Wiggins MD, 1 patch at 05/10/24 1113    midodrine (PROAMATINE) tablet 5 mg, 5 mg, Oral, Q8H PRN, Kael Wiggins MD    nitroglycerin (NITROSTAT) SL tablet 0.4 mg, 0.4 mg, Sublingual, Q5 Min PRN, Soledad Colmenares MD    ondansetron ODT (ZOFRAN-ODT) disintegrating tablet 4 mg, 4 mg, Oral, Q6H PRN, 4 mg at 05/08/24 1333 **OR** ondansetron (ZOFRAN) injection 4 mg, 4 mg, Intravenous, Q6H PRN, Kael Wiggins MD    sodium chloride 0.9 % flush 10 mL, 10 mL, Intravenous, PRN, Soledad Colmenares MD    sodium chloride 0.9 % flush 10 mL, 10 mL, Intravenous, Q12H, Soledad Colmenares MD, 10 mL at 05/10/24 2038    sodium chloride 0.9 % infusion 40 mL, 40 mL, Intravenous, PRN, Soledad Colmenares MD    Please refer to the medical record for a full medication list    Review of Systems:    As above    Physical Exam:   Vital Signs   Temp:  [96.6 °F (35.9 °C)-97.5 °F (36.4 °C)] 97 °F (36.1 °C)  Heart Rate:  [70-76] 71  Resp:  [16-20] 18  BP: (124-165)/(82-94) 135/85    Temp  Min: 96.6 °F (35.9 °C)  Max: 97.5 °F (36.4 °C)  BP  Min: 124/89  Max: 165/85  Pulse  Min: 70  Max: 76  Resp  Min: 16   "Max: 20  SpO2  Min: 94 %  Max: 98 %    Blood pressure 135/85, pulse 71, temperature 97 °F (36.1 °C), temperature source Oral, resp. rate 18, height 182.9 cm (72\"), weight 77.9 kg (171 lb 11.2 oz), SpO2 97%.  GENERAL: Awake and alert, No acute distress.  Sitting up in bed.  Less ill-appearing.  HEENT:  Normocephalic, atraumatic.  No external oral lesions noted  EYES:  No conjunctival injection. No icterus.   HEART: RRR, no murmur  LUNGS: CTA B.  Nonlabored breathing on room air.  ABDOMEN: Soft, nontender, nondistended. No appreciable HSM.    GENITAL: no Yeung catheter  SKIN: No generalized rashes noted  PSYCHIATRIC: cooperative.  Appropriate mood and affect  EXT:  No cellulitic change. No peripheral edema  NEURO: awake alert and oriented ×4.  Normal speech and cognition    AICD pocket site is without erythema or tenderness    Mediport site is without erythema     Results Review:   I reviewed the patient's new clinical results.  I reviewed the patient's new imaging results and agree with the interpretation.  I reviewed the patient's other test results and agree with the interpretation    Results from last 7 days   Lab Units 05/11/24  0421 05/10/24  0324 05/09/24  0340   WBC 10*3/mm3 11.07* 10.73 11.78*   HEMOGLOBIN g/dL 9.2* 8.7* 8.4*   HEMATOCRIT % 27.8* 25.9* 25.5*   PLATELETS 10*3/mm3 216 192 199     Results from last 7 days   Lab Units 05/11/24  0421   SODIUM mmol/L 138   POTASSIUM mmol/L 3.7   CHLORIDE mmol/L 95*   CO2 mmol/L 29.0   BUN mg/dL 44*   CREATININE mg/dL 7.08*   GLUCOSE mg/dL 116*   CALCIUM mg/dL 8.6     Results from last 7 days   Lab Units 05/07/24  0502   ALK PHOS U/L 72   BILIRUBIN mg/dL 0.3   ALT (SGPT) U/L 13   AST (SGOT) U/L 18                 Results from last 7 days   Lab Units 05/06/24  1152   LACTATE mmol/L 1.7     Estimated Creatinine Clearance: 9.9 mL/min (A) (by C-G formula based on SCr of 7.08 mg/dL (H)).  CPK          5/6/2024    02:07   Common Labs   Creatine Kinase 12     " "  Procalitonin Results:      Lab 05/05/24  2352   PROCALCITONIN 1.89*      Brief Urine Lab Results  (Last result in the past 365 days)        Color   Clarity   Blood   Leuk Est   Nitrite   Protein   CREAT   Urine HCG        05/06/24 1206             60.0         05/06/24 1206 Yellow   Cloudy   Trace   Small (1+)   Negative   >=300 mg/dL (3+)                  No results found for: \"SITE\", \"ALLENTEST\", \"PHART\", \"TMO8XQS\", \"PO2ART\", \"FOV6TWB\", \"BASEEXCESS\", \"T6IIBCOM\", \"HGBBG\", \"HCTABG\", \"OXYHEMOGLOBI\", \"METHHGBN\", \"CARBOXYHGB\", \"CO2CT\", \"BAROMETRIC\", \"MODALITY\", \"FIO2\"     Microbiology:  5/6 blood culture ×2: No growth to date    Urine culture: No growth final    Radiology:  Imaging Results (Last 72 Hours)       Procedure Component Value Units Date/Time    XR Chest 1 View [[id number]] Collected: 05/11/24 0024     Updated: 05/11/24 0028    Narrative:      XR CHEST 1 VW    Date of Exam: 5/11/2024 12:11 AM EDT    Indication: dyspnea    Comparison: 5/6/2024.    Findings:  There are no airspace consolidations. Stable scarring present within the right lung base with stable small right-sided pleural effusion. Stable right subclavian AICD/pacemaker device. Mild linear atelectatic changes are present within the left lung base   which appear new.. No pneumothorax. The pulmonary vasculature appears within normal limits. The cardiac and mediastinal silhouette appear unremarkable. No acute osseous abnormality identified.      Impression:      Impression:  New mild linear left basilar atelectasis. Otherwise, stable with redemonstration of small right-sided pleural effusion..      Electronically Signed: Desiree Matthews MD    5/11/2024 12:25 AM EDT    Workstation ID: [id number]            IMPRESSION:     Problems:  Recurrent sepsis, now presenting with septic shock with severe leukocytosis with neutrophilia, elevated procalcitonin level, lactic acidosis, hypotension with pressor requirement. Could be due to pyelonephritis that is noted on " imaging but no positive culture data today.  Multiple urine cultures have been sent although may have been antibiotic modified.  Blood cultures have been no growth. Karius test from last admission was negative although was modified by antibiotics. He is seemingly responded well to cefepime.  He had recurrence on oral Levaquin.  May need to give him a longer course of empiric cefepime at time of discharge. Seems to be improving again on cefepime. He has a very unusual case but this does seem to be an infectious etiology/bacterial infection that is responding to cefepime but he quickly declined after stopping cefepime each time even after his long is a 10 day course. Seems to be improving with cefepime again.  Leukocytosis has improved.  No fevers.  Hypotension has improved.  Bilateral pyelonephritis-present on imaging and some symptoms consistent with pyelonephritis but no culprit identified on culture.  No hydronephrosis or renal stone.  Unusual presentation in immunocompromised host. Urine culture was again no growth.  Acute renal failure-Creatinine and urine output are improving.  Nausea and vomiting-overall improved but is having some slight nausea   Diarrhea-Resolved.  Per his girlfriend who is at bedside she states that he had more of incontinence prior to arrival due to altered mental status and was not severe watery diarrhea.  Stable small loculated right-sided pleural effusion-likely postoperative after lobectomy.  No shortness of breath.  I discussed this with Dr. Colmenares and she does not think that this is the source for his infection. I agree with this.  NSCLC/RLL lobectomy in January 2024, recently on Opdivo (last dose on 4/4/24)  Emphysema      RECOMMENDATIONS:    -Closely follow CBC and CMP  -Check Brucella serologies.  Although this seems unlikely given his lack of exposures by history (no history of consuming unpasteurized milk products), Brucella can cause pyelonephritis and GI symptoms and  could potentially respond partially to antibiotic therapy.  Also may not show up in cultures and carious test that was sent previously was modified by antibiotics.  -Follow pending blood cultures-No growth to date  -DAMIÁN without signs of infective endocarditis  -Agree with cefepime 2 g IV every 24 hours. May need to give a longer course of IV antibiotic therapy.  Could consider a four-week course empirically if no new source identified  -Continue to monitor renal function.  Nephrology is following.  Creatinine seems to have plateaued and urine output has increased.    I discussed in length of the patient and his girlfriend today    If the patient continues to improve then I would be okay with his discharge on Monday with the below plan.    UM/XAVI:  Cefepime 2g IV q24h. Can infuse INPAT at [facility] through mediport if ok with oncology. Tentatively planning to continue this antibiotic for 4 weeks until 6/3/24.  Weekly CBC, CMP, CRP  Follow-up in my clinic within 1 week of discharge  Please fax a copy of my orders to [facility] at [phone] and call [phone] with final discharge plans     Thank you for asking me to see David Barfield.      Complex MDM.     Derian Barry MD  5/11/2024

## 2024-05-11 NOTE — PROGRESS NOTES
"   LOS: 5 days    Patient Care Team:  Hayley Castellanos APRN as PCP - General (Nurse Practitioner)  Octaviano Sampson MD as Consulting Physician (Pulmonary Disease)  Neetu Ashley MD as Referring Physician (Hematology and Oncology)  Nimo Rodríguez MD as Consulting Physician (Radiation Oncology)    Subjective   Chart reviewed.  Good urine output.  Mild improvement in renal function discussed with the patient  No dialysis for today.  Review of system:  Denies any nausea vomiting chest pain or shortness of breath no dysuria or hematuria..    Objective     Vital Signs:  Blood pressure 128/88, pulse 71, temperature 96.8 °F (36 °C), temperature source Oral, resp. rate 18, height 182.9 cm (72\"), weight 77.9 kg (171 lb 11.2 oz), SpO2 96%.      Intake/Output Summary (Last 24 hours) at 5/11/2024 1245  Last data filed at 5/11/2024 1100  Gross per 24 hour   Intake 3280.71 ml   Output 4100 ml   Net -819.29 ml        05/10 0701 - 05/11 0700  In: 3280.7 [I.V.:3180.7]  Out: 4650 [Urine:4650]    Physical Exam:  General Appearance: Awake alert oriented no obvious distress.  Neck: Supple no JVD.  Lungs: Clear auscultation, no rales rhonchi's, equal chest movement, nonlabored.  Heart: No gallop, murmur, rub, RRR.  Abdomen: Soft, nontender, positive bowel sounds, no organomegaly.  Extremities: No lower extremity edema, no cyanosis.  Neuro: No focal deficit, moving all extremities, alert oriented X 3   no Yeung catheter.     Labs:  Results from last 7 days   Lab Units 05/11/24  0421 05/10/24  0324 05/09/24  0340   WBC 10*3/mm3 11.07* 10.73 11.78*   HEMOGLOBIN g/dL 9.2* 8.7* 8.4*   PLATELETS 10*3/mm3 216 192 199     Results from last 7 days   Lab Units 05/11/24  0421 05/10/24  0324 05/09/24  0340 05/08/24  0413 05/07/24  0502 05/06/24  0035 05/05/24  2352   SODIUM mmol/L 138 133* 132* 128* 131*   < > 141   POTASSIUM mmol/L 3.7 4.2 4.1 4.5 4.5   < > 4.1   CHLORIDE mmol/L 95* 92* 98 97* 99   < > 106   CO2 mmol/L 29.0 24.0 20.0* " 16.0* 17.0*   < > 20.0*   BUN mg/dL 44* 49* 51* 47* 36*   < > 23   CREATININE mg/dL 7.08* 7.64* 7.72* 6.37* 5.11*   < > 2.80*   CALCIUM mg/dL 8.6 8.4* 8.7 8.4* 8.2*   < > 8.7   PHOSPHORUS mg/dL 5.1* 5.9* 5.0* 4.4 3.1   < >  --    MAGNESIUM mg/dL  --   --  2.6*  --  2.3  --  1.4*   ALBUMIN g/dL 3.1* 2.7* 2.8* 2.5* 2.7*  --  3.2*    < > = values in this interval not displayed.     Results from last 7 days   Lab Units 05/07/24  0502   ALK PHOS U/L 72   BILIRUBIN mg/dL 0.3   ALT (SGPT) U/L 13   AST (SGOT) U/L 18             Estimated Creatinine Clearance: 9.9 mL/min (A) (by C-G formula based on SCr of 7.08 mg/dL (H)).         A/P:  1.  ARF: Creatinine continues to rise.  Urine output oliguric.  Patient with recurrent ARF with improvement to 2.0 on last discharge..  Current injury likely due to recurrence of ATN with hemodynamic instability.  Patient with new findings of proteinuria.  Hopefully will see signs of recovery soon with improved hemodynamics.  Good urine output with stabilization of creatinine, will monitor closely no dialysis today.     2.  Sepsis: Resolving.  Off pressors.  Off midodrine    3.  Proteinuria: Patient with 8.8 g proteinuria on ratio.  Previously patient had a microalbumin of 9.  SPEP negative in the past.  Patient with recurrent acute renal failure and perinephric stranding previously thought due to pyelonephritis.  Will monitor    4.  Hyponatremia stable.    5.  Metabolic acidosis: Will DC bicarb drip.     6.  Volume: No edema.  Chest x-ray stable.  Good oxygenation.  Strict I's and O's.     7.  ID:  Was on Levaquin as outpatient.  CT scan with possible pyelonephritis but cultures have been negative previously.  ID continues to evaluate.     8.  GI upset has been recurrent over the past month with recurrent admissions.  Denies any symptoms at this time.     9.  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  Last infusion 4/4/2024.  Follows with Dr. Ashley.     High risk  complex patient multiple medical problems.  Daily evaluation of renal replacement therapy will be done no dialysis indicated at this time.  Avoid nephrotoxic medications.  Case discussed with the wife in the room       Alok Dixon MD  05/11/24  12:45 EDT

## 2024-05-11 NOTE — PROGRESS NOTES
[facility] Medicine Services  PROGRESS NOTE    Patient Name: David Barfield  : 1950  MRN: [id number]    Date of Admission: 2024  Primary Care Physician: Hayley Castellanos APRN    Subjective   Subjective     CC:  Septic shock    HPI:  No acute events over the night.  Hemodynamically stable.  Creatinine is trending down to 7 from 7.6.  Still making decent amount of urine.  Nephrology on board.  No plan start hemodialysis yet.  Continue to monitor over the weekend.      Objective   Objective     Vital Signs:   Temp:  [96.6 °F (35.9 °C)-97.8 °F (36.6 °C)] 96.6 °F (35.9 °C)  Heart Rate:  [70-76] 72  Resp:  [16-20] 16  BP: (124-165)/(80-94) 124/89     Physical Exam:  Physical Exam  Vitals and nursing note reviewed.   Constitutional:       Appearance: Normal appearance.   HENT:      Head: Normocephalic and atraumatic.   Cardiovascular:      Rate and Rhythm: Normal rate.   Pulmonary:      Effort: Pulmonary effort is normal.   Abdominal:      General: Abdomen is flat. Bowel sounds are normal.   Skin:     General: Skin is warm.   Neurological:      General: No focal deficit present.      Mental Status: He is alert. Mental status is at baseline.   Psychiatric:         Mood and Affect: Mood normal.          Results Reviewed:  LAB RESULTS:      Lab 24  0421 05/10/24  0324 24  0340 24  0413 24  0502 24  1152 24  0909 24  0518 24  0035 24  2352   WBC 11.07* 10.73 11.78* 16.54* 28.99*  --   --   --   --  33.46*   HEMOGLOBIN 9.2* 8.7* 8.4* 9.1* 9.4*  --   --   --   --  12.0*   HEMOGLOBIN, POC  --   --   --   --   --   --   --   --    < >  --    HEMATOCRIT 27.8* 25.9* 25.5* 27.9* 29.0*  --   --   --   --  38.9   HEMATOCRIT POC  --   --   --   --   --   --   --   --    < >  --    PLATELETS 216 192 199 202 175  --   --   --   --  245   NEUTROS ABS 7.64* 7.90* 9.31* 13.85* 26.18*  --   --   --   --  29.67*   IMMATURE GRANS (ABS) 0.09* 0.04  0.07* 0.09* 0.22*  --   --   --   --  0.34*   LYMPHS ABS 1.41 1.12 0.87 0.80 0.96  --   --   --   --  1.32   MONOS ABS 1.70* 1.45* 1.28* 1.47* 1.26*  --   --   --   --  1.94*   EOS ABS 0.17 0.17 0.19 0.25 0.25  --   --   --   --  0.05   MCV 88.3 87.2 88.9 92.7 91.5  --   --   --   --  95.3   PROCALCITONIN  --   --   --   --   --   --   --   --   --  1.89*   LACTATE  --   --   --   --   --  1.7 2.8* 2.1*  --  3.8*   PROTIME  --   --   --  18.0*  --   --   --   --   --   --     < > = values in this interval not displayed.         Lab 05/11/24  0421 05/10/24  0324 05/09/24  0340 05/08/24  0413 05/07/24  0502 05/06/24  0035 05/05/24  2352   SODIUM 138 133* 132* 128* 131*   < > 141   POTASSIUM 3.7 4.2 4.1 4.5 4.5   < > 4.1   CHLORIDE 95* 92* 98 97* 99   < > 106   CO2 29.0 24.0 20.0* 16.0* 17.0*   < > 20.0*   ANION GAP 14.0 17.0* 14.0 15.0 15.0   < > 15.0   BUN 44* 49* 51* 47* 36*   < > 23   CREATININE 7.08* 7.64* 7.72* 6.37* 5.11*   < > 2.80*   EGFR 7.5* 6.8* 6.8* 8.5* 11.1*   < > 22.8*   GLUCOSE 116* 108* 97 90 93   < > 114*   CALCIUM 8.6 8.4* 8.7 8.4* 8.2*   < > 8.7   MAGNESIUM  --   --  2.6*  --  2.3  --  1.4*   PHOSPHORUS 5.1* 5.9* 5.0* 4.4 3.1  --   --     < > = values in this interval not displayed.         Lab 05/11/24  0421 05/10/24  0324 05/09/24  0340 05/08/24 0413 05/07/24  0502 05/05/24 2352   TOTAL PROTEIN  --   --   --   --  6.1 7.1   ALBUMIN 3.1* 2.7* 2.8* 2.5* 2.7* 3.2*   GLOBULIN  --   --   --   --  3.4 3.9   ALT (SGPT)  --   --   --   --  13 22   AST (SGOT)  --   --   --   --  18 30   BILIRUBIN  --   --   --   --  0.3 0.4   ALK PHOS  --   --   --   --  72 76   LIPASE  --   --   --   --   --  30         Lab 05/08/24 0413 05/06/24  0207 05/05/24 2352   HSTROP T  --  37* 30*   PROTIME 18.0*  --   --    INR 1.47*  --   --                Lab 05/05/24 2352   ABO TYPING A   RH TYPING Negative   ANTIBODY SCREEN Negative         Brief Urine Lab Results  (Last result in the past 365 days)        Color    Clarity   Blood   Leuk Est   Nitrite   Protein   CREAT   Urine HCG        05/06/24 1206             60.0         05/06/24 1206 Yellow   Cloudy   Trace   Small (1+)   Negative   >=300 mg/dL (3+)                   Microbiology Results Abnormal       Procedure Component Value - Date/Time    Blood Culture - Blood, Arm, Right [[id number]]  (Normal) Collected: 05/06/24 0054    Lab Status: Final result Specimen: Blood from Arm, Right Updated: 05/11/24 0115     Blood Culture No growth at 5 days    Blood Culture - Blood, Wrist, Right [[id number]]  (Normal) Collected: 05/06/24 1228    Lab Status: Final result Specimen: Blood from Wrist, Right Updated: 05/11/24 0100     Blood Culture No growth at 5 days    Urine Culture - Urine, Urine, Clean Catch [[id number]]  (Normal) Collected: 05/06/24 1206    Lab Status: Final result Specimen: Urine, Clean Catch Updated: 05/07/24 0956     Urine Culture No growth    Gastrointestinal Panel, PCR - Stool, Per Rectum [[id number]]  (Normal) Collected: 05/07/24 0509    Lab Status: Final result Specimen: Stool from Per Rectum Updated: 05/07/24 0751     Campylobacter Not Detected     Plesiomonas shigelloides Not Detected     Salmonella Not Detected     Vibrio Not Detected     Vibrio cholerae Not Detected     Yersinia enterocolitica Not Detected     Enteroaggregative E. coli (EAEC) Not Detected     Enteropathogenic E. coli (EPEC) Not Detected     Enterotoxigenic E. coli (ETEC) lt/st Not Detected     Shiga-like toxin-producing E. coli (STEC) stx1/stx2 Not Detected     Shigella/Enteroinvasive E. coli (EIEC) Not Detected     Cryptosporidium Not Detected     Cyclospora cayetanensis Not Detected     Entamoeba histolytica Not Detected     Giardia lamblia Not Detected     Adenovirus F40/41 Not Detected     Astrovirus Not Detected     Norovirus GI/GII Not Detected     Rotavirus A Not Detected     Sapovirus (I, II, IV or V) Not Detected    Eosinophil Smear - Urine, Urine, Clean Catch [[id number]]  (Normal)  Collected: 05/06/24 1206    Lab Status: Final result Specimen: Urine, Clean Catch Updated: 05/06/24 1716     Eosinophil Smear 0 % EOS/100 Cells     Narrative:      No eosinophil seen    Legionella Antigen, Urine - Urine, Urine, Clean Catch [[id number]]  (Normal) Collected: 05/06/24 0507    Lab Status: Final result Specimen: Urine, Clean Catch Updated: 05/06/24 0940     LEGIONELLA ANTIGEN, URINE Negative    S. Pneumo Ag Urine or CSF - Urine, Urine, Clean Catch [[id number]]  (Normal) Collected: 05/06/24 0507    Lab Status: Final result Specimen: Urine, Clean Catch Updated: 05/06/24 0940     Strep Pneumo Ag Negative    MRSA Screen, PCR (Inpatient) - Swab, Nares [[id number]]  (Normal) Collected: 05/06/24 0656    Lab Status: Final result Specimen: Swab from Nares Updated: 05/06/24 0831     MRSA PCR Negative    Narrative:      The negative predictive value of this diagnostic test is high and should only be used to consider de-escalating anti-MRSA therapy. A positive result may indicate colonization with MRSA and must be correlated clinically.  MRSA Negative    COVID PRE-OP / PRE-PROCEDURE SCREENING ORDER (NO ISOLATION) - Swab, Nasopharynx [[id number]]  (Normal) Collected: 05/05/24 2353    Lab Status: Final result Specimen: Swab from Nasopharynx Updated: 05/06/24 0102    Narrative:      The following orders were created for panel order COVID PRE-OP / PRE-PROCEDURE SCREENING ORDER (NO ISOLATION) - Swab, Nasopharynx.  Procedure                               Abnormality         Status                     ---------                               -----------         ------                     Respiratory Panel PCR w/...[[id number]]  Normal              Final result                 Please view results for these tests on the individual orders.    Respiratory Panel PCR w/COVID-19(SARS-CoV-2) [facility]/DENA/[facility]/JEROME In-House, NP Swab in UTM/VTM, 2 HR TAT - Swab, Nasopharynx [[id number]]  (Normal) Collected: 05/05/24 2353    Lab Status:  Final result Specimen: Swab from Nasopharynx Updated: 05/06/24 0102     ADENOVIRUS, PCR Not Detected     Coronavirus 229E Not Detected     Coronavirus HKU1 Not Detected     Coronavirus NL63 Not Detected     Coronavirus OC43 Not Detected     COVID19 Not Detected     Human Metapneumovirus Not Detected     Human Rhinovirus/Enterovirus Not Detected     Influenza A PCR Not Detected     Influenza B PCR Not Detected     Parainfluenza Virus 1 Not Detected     Parainfluenza Virus 2 Not Detected     Parainfluenza Virus 3 Not Detected     Parainfluenza Virus 4 Not Detected     RSV, PCR Not Detected     Bordetella pertussis pcr Not Detected     Bordetella parapertussis PCR Not Detected     Chlamydophila pneumoniae PCR Not Detected     Mycoplasma pneumo by PCR Not Detected    Narrative:      In the setting of a positive respiratory panel with a viral infection PLUS a negative procalcitonin without other underlying concern for bacterial infection, consider observing off antibiotics or discontinuation of antibiotics and continue supportive care. If the respiratory panel is positive for atypical bacterial infection (Bordetella pertussis, Chlamydophila pneumoniae, or Mycoplasma pneumoniae), consider antibiotic de-escalation to target atypical bacterial infection.            XR Chest 1 View    Result Date: 5/11/2024  XR CHEST 1 VW Date of Exam: 5/11/2024 12:11 AM EDT Indication: dyspnea Comparison: 5/6/2024. Findings: There are no airspace consolidations. Stable scarring present within the right lung base with stable small right-sided pleural effusion. Stable right subclavian AICD/pacemaker device. Mild linear atelectatic changes are present within the left lung base which appear new.. No pneumothorax. The pulmonary vasculature appears within normal limits. The cardiac and mediastinal silhouette appear unremarkable. No acute osseous abnormality identified.     Impression: Impression: New mild linear left basilar atelectasis.  Otherwise, stable with redemonstration of small right-sided pleural effusion.. Electronically Signed: Desiree Matthews MD  5/11/2024 12:25 AM EDT  Workstation ID: [id number]    Adult Transesophageal Echo 3D (DAMIÁN) W/ Cont If Necessary Per Protocol    Result Date: 5/9/2024    Left ventricular systolic function is normal. Left ventricular ejection fraction appears to be 61 - 65%. Normal left ventricular cavity size noted. Left ventricular wall thickness is consistent with mild concentric hypertrophy. All left ventricular wall segments contract normally.   There is mild calcification of the aortic valve. The aortic valve appears trileaflet. Mild aortic valve regurgitation is present. No aortic valve stenosis is present. There is no evidence of an aortic valve mass is present.   There is mild calcification of the mitral valve. There is mild, bileaflet mitral valve thickening present. No evidence of a mitral valve mass is present. Mild to moderate mitral valve regurgitation is present. No significant mitral valve stenosis is present.   The tricuspid valve is structurally normal with no significant stenosis present. There is no evidence of a mass on the tricuspid valve. Trace tricuspid valve regurgitation is present.   The pulmonic valve is grossly normal in structure. There is trace pulmonic valve regurgitation present.   No vegetation seen on atrial aspect of pacemaker leads.  The most distal aspects of the RV lead were not completely visualized however there was no apparent vegetation in the more proximal segment, adjacent to the tricuspid valve which appears to be functioning normally.      Results for orders placed during the hospital encounter of 05/05/24    Adult Transesophageal Echo 3D (DAMIÁN) W/ Cont If Necessary Per Protocol    Interpretation Summary    Left ventricular systolic function is normal. Left ventricular ejection fraction appears to be 61 - 65%. Normal left ventricular cavity size noted. Left ventricular wall  thickness is consistent with mild concentric hypertrophy. All left ventricular wall segments contract normally.    There is mild calcification of the aortic valve. The aortic valve appears trileaflet. Mild aortic valve regurgitation is present. No aortic valve stenosis is present. There is no evidence of an aortic valve mass is present.    There is mild calcification of the mitral valve. There is mild, bileaflet mitral valve thickening present. No evidence of a mitral valve mass is present. Mild to moderate mitral valve regurgitation is present. No significant mitral valve stenosis is present.    The tricuspid valve is structurally normal with no significant stenosis present. There is no evidence of a mass on the tricuspid valve. Trace tricuspid valve regurgitation is present.    The pulmonic valve is grossly normal in structure. There is trace pulmonic valve regurgitation present.    No vegetation seen on atrial aspect of pacemaker leads.  The most distal aspects of the RV lead were not completely visualized however there was no apparent vegetation in the more proximal segment, adjacent to the tricuspid valve which appears to be functioning normally.      Current medications:  Scheduled Meds:budesonide-formoterol, 2 puff, Inhalation, BID - RT   And  tiotropium bromide monohydrate, 2 puff, Inhalation, Daily - RT  cefepime, 2,000 mg, Intravenous, Q24H  famotidine, 20 mg, Oral, Daily  heparin (porcine), 5,000 Units, Subcutaneous, Q12H  Lidocaine, 1 patch, Transdermal, Q24H  senna-docusate sodium, 2 tablet, Oral, BID  sodium chloride, 10 mL, Intravenous, Q12H      Continuous Infusions:sodium bicarbonate 8.4 % 150 mEq in dextrose (D5W) 5 % 1,000 mL infusion (greater than 100 mEq), 150 mEq, Last Rate: 150 mEq (05/10/24 6051)      PRN Meds:.  acetaminophen **OR** acetaminophen    albuterol    senna-docusate sodium **AND** polyethylene glycol **AND** bisacodyl **AND** bisacodyl    midodrine    nitroglycerin    ondansetron  ODT **OR** ondansetron    sodium chloride    sodium chloride    Assessment & Plan   Assessment & Plan     Active Hospital Problems    Diagnosis  POA    **Acute pyelonephritis [N10]  Yes    CAD (coronary artery disease) [I25.10]  Yes    Pyelonephritis [N12]  Yes    ARACELI (acute kidney injury) [N17.9]  Yes    Septic shock [A41.9, R65.21]  Yes    Primary hypertension [I10]  Yes    GERD without esophagitis [K21.9]  Yes    Malignant neoplasm of lower lobe of right lung [C34.31]  Yes    Tobacco abuse [Z72.0]  Yes    Centrilobular emphysema [J43.2]  Yes    Mixed hyperlipidemia [E78.2]  Yes      Resolved Hospital Problems   No resolved problems to display.        Brief Hospital Course to date:  David Barfield is a 74 y.o. male with history of non-small cell lung ca s/p neoadjuvant chemoimmunotherapy and RLL lobectomy currently on Opdivo (last 4/4/24), HTN, emphysema, presence of port and pacemaker, current tobacco use, recent admission 4/12-4/22 & 4/24 - 4/29 for sepsis related to pyelonephritis who was admitted on 05/05 for septic shock secondary to possible another episode of acute pyelonephritis.  Patient was started on pressors and antibiotic.  Patient also developed oliguric ARACELI.  ID/nephrology/oncology consulted.  Patient now on cefepime and midodrine.  He was downgraded to medical floor on 05/07.    Acute pyelonephritis.  Recurrent  ARACELI.  Likely ATN.  Improving   Septic shock secondary to the above.  Resolved  Metabolic acidosis/hyponatremia.  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  Continue breathing treatment    Plan:  Afebrile.  Blood cultures with no growth to date.  White blood count is trending down.TTE negative.  Continue IV antibiotic.  Patient will need 4 weeks of IV antibiotic through 06/03.  PICC line is contraindicated in the setting of advanced CKD and questionable need for dialysis.  to come to [facility] office for IVAB.  Okay from oncology standpoint to utilize the  port, but they would prefer that port needle access/exchange is performed at hospital or infusion center rather than through home health.       Blood pressure has been in 140s 150s.  Switch midodrine 5 mg to as needed if MAP less than 5 monitor urine output.     Nephrology on board.  Kidney function improving today.  Decent urine output.  No plan start hemodialysis yet.  Hopefully his kidneys will recover.  Continue to monitor over the weekend.  Continue fluid management per nephrology.    Oncology on board.  Hold further therapy in the setting of acute infection and ARACELI.  Continue breathing treatment.         Expected Discharge Location and Transportation: Likely home on Monday.  Expected Discharge   Expected Discharge Date: 5/13/2024; Expected Discharge Time:      DVT prophylaxis:  Medical DVT prophylaxis orders are present.         AM-PAC 6 Clicks Score (PT): 23 (05/10/24 2000)    CODE STATUS:   Code Status and Medical Interventions:   Ordered at: 05/06/24 0331     Code Status (Patient has no pulse and is not breathing):    CPR (Attempt to Resuscitate)     Medical Interventions (Patient has pulse or is breathing):    Full Support       Kael Wiggins MD  05/11/24

## 2024-05-11 NOTE — PLAN OF CARE
Problem: Adult Inpatient Plan of Care  Goal: Absence of Hospital-Acquired Illness or Injury  Intervention: Prevent and Manage VTE (Venous Thromboembolism) Risk  Recent Flowsheet Documentation  Taken 5/10/2024 2000 by Khushboo Canada RN  Activity Management: activity encouraged  Range of Motion: active ROM (range of motion) encouraged     Problem: Adult Inpatient Plan of Care  Goal: Absence of Hospital-Acquired Illness or Injury  Intervention: Prevent Infection  Recent Flowsheet Documentation  Taken 5/10/2024 2000 by Khushboo Canada RN  Infection Prevention:   cohorting utilized   environmental surveillance performed   rest/sleep promoted     Problem: Adult Inpatient Plan of Care  Goal: Absence of Hospital-Acquired Illness or Injury  Intervention: Prevent Skin Injury  Recent Flowsheet Documentation  Taken 5/10/2024 2000 by Khushboo Canada RN  Body Position: position changed independently  Skin Protection:   adhesive use limited   incontinence pads utilized     Problem: Infection Progression (Sepsis/Septic Shock)  Goal: Absence of Infection Signs and Symptoms  Outcome: Ongoing, Progressing  Intervention: Initiate Sepsis Management  Recent Flowsheet Documentation  Taken 5/10/2024 2000 by Khushboo Canada RN  Infection Prevention:   cohorting utilized   environmental surveillance performed   rest/sleep promoted  Intervention: Promote Recovery  Recent Flowsheet Documentation  Taken 5/10/2024 2000 by Khushboo Canada RN  Activity Management: activity encouraged   Goal Outcome Evaluation:  Plan of Care Reviewed With: patient, daughter        Progress: no change

## 2024-05-11 NOTE — PLAN OF CARE
Problem: Adult Inpatient Plan of Care  Goal: Absence of Hospital-Acquired Illness or Injury  Intervention: Identify and Manage Fall Risk  Recent Flowsheet Documentation  Taken 5/11/2024 1600 by Gin Singh RN  Safety Promotion/Fall Prevention:   safety round/check completed   room organization consistent   nonskid shoes/slippers when out of bed   clutter free environment maintained   assistive device/personal items within reach  Taken 5/11/2024 1400 by Gin Singh RN  Safety Promotion/Fall Prevention:   safety round/check completed   room organization consistent   fall prevention program maintained   clutter free environment maintained   assistive device/personal items within reach  Taken 5/11/2024 1200 by Gin Singh RN  Safety Promotion/Fall Prevention:   nonskid shoes/slippers when out of bed   safety round/check completed  Taken 5/11/2024 0854 by Gni Singh RN  Safety Promotion/Fall Prevention:   activity supervised   assistive device/personal items within reach   clutter free environment maintained   nonskid shoes/slippers when out of bed   room organization consistent   safety round/check completed     Problem: Adult Inpatient Plan of Care  Goal: Optimal Comfort and Wellbeing  Outcome: Ongoing, Progressing  Intervention: Monitor Pain and Promote Comfort  Recent Flowsheet Documentation  Taken 5/11/2024 0854 by Gin Singh RN  Pain Management Interventions: care clustered  Intervention: Provide Person-Centered Care  Recent Flowsheet Documentation  Taken 5/11/2024 1600 by Gin Singh RN  Trust Relationship/Rapport:   care explained   choices provided   emotional support provided   empathic listening provided   questions answered   questions encouraged   reassurance provided   thoughts/feelings acknowledged  Taken 5/11/2024 1400 by Gin Singh RN  Trust Relationship/Rapport:   care explained   questions answered   questions encouraged   thoughts/feelings acknowledged  Taken  5/11/2024 1200 by Gin Singh, RN  Trust Relationship/Rapport:   care explained   questions answered   questions encouraged   thoughts/feelings acknowledged  Taken 5/11/2024 0854 by Gin Singh, RN  Trust Relationship/Rapport:   care explained   questions answered   questions encouraged   thoughts/feelings acknowledged   Goal Outcome Evaluation:

## 2024-05-12 ENCOUNTER — READMISSION MANAGEMENT (OUTPATIENT)
Dept: CALL CENTER | Facility: HOSPITAL | Age: 74
End: 2024-05-12
Payer: MEDICARE

## 2024-05-12 VITALS
TEMPERATURE: 97 F | SYSTOLIC BLOOD PRESSURE: 140 MMHG | DIASTOLIC BLOOD PRESSURE: 85 MMHG | OXYGEN SATURATION: 97 % | RESPIRATION RATE: 16 BRPM | WEIGHT: 168 LBS | HEIGHT: 72 IN | BODY MASS INDEX: 22.75 KG/M2 | HEART RATE: 78 BPM

## 2024-05-12 LAB
ALBUMIN SERPL-MCNC: 3.4 G/DL (ref 3.5–5.2)
ANION GAP SERPL CALCULATED.3IONS-SCNC: 15 MMOL/L (ref 5–15)
BUN SERPL-MCNC: 39 MG/DL (ref 8–23)
BUN/CREAT SERPL: 6.5 (ref 7–25)
CALCIUM SPEC-SCNC: 8.9 MG/DL (ref 8.6–10.5)
CHLORIDE SERPL-SCNC: 97 MMOL/L (ref 98–107)
CO2 SERPL-SCNC: 27 MMOL/L (ref 22–29)
CREAT SERPL-MCNC: 5.99 MG/DL (ref 0.76–1.27)
EGFRCR SERPLBLD CKD-EPI 2021: 9.2 ML/MIN/1.73
GLUCOSE SERPL-MCNC: 99 MG/DL (ref 65–99)
PHOSPHATE SERPL-MCNC: 5.1 MG/DL (ref 2.5–4.5)
POTASSIUM SERPL-SCNC: 4.1 MMOL/L (ref 3.5–5.2)
SODIUM SERPL-SCNC: 139 MMOL/L (ref 136–145)

## 2024-05-12 PROCEDURE — 99239 HOSP IP/OBS DSCHRG MGMT >30: CPT | Performed by: HOSPITALIST

## 2024-05-12 PROCEDURE — 80069 RENAL FUNCTION PANEL: CPT | Performed by: INTERNAL MEDICINE

## 2024-05-12 PROCEDURE — 94664 DEMO&/EVAL PT USE INHALER: CPT

## 2024-05-12 PROCEDURE — 94799 UNLISTED PULMONARY SVC/PX: CPT

## 2024-05-12 PROCEDURE — 25010000002 HEPARIN (PORCINE) PER 1000 UNITS: Performed by: INTERNAL MEDICINE

## 2024-05-12 PROCEDURE — 25010000002 CEFEPIME PER 500 MG: Performed by: INTERNAL MEDICINE

## 2024-05-12 RX ADMIN — HEPARIN SODIUM 5000 UNITS: 5000 INJECTION INTRAVENOUS; SUBCUTANEOUS at 08:52

## 2024-05-12 RX ADMIN — FAMOTIDINE 20 MG: 20 TABLET, FILM COATED ORAL at 08:52

## 2024-05-12 RX ADMIN — BUDESONIDE AND FORMOTEROL FUMARATE DIHYDRATE 2 PUFF: 160; 4.5 AEROSOL RESPIRATORY (INHALATION) at 09:22

## 2024-05-12 RX ADMIN — Medication 10 ML: at 09:07

## 2024-05-12 RX ADMIN — CEFEPIME 2000 MG: 2 INJECTION, POWDER, FOR SOLUTION INTRAVENOUS at 08:52

## 2024-05-12 RX ADMIN — TIOTROPIUM BROMIDE INHALATION SPRAY 2 PUFF: 3.12 SPRAY, METERED RESPIRATORY (INHALATION) at 09:22

## 2024-05-12 NOTE — DISCHARGE SUMMARY
[facility]  DISCHARGE SUMMARY    Patient Name: David Barfield  : 1950  MRN: [id number]    Date of Admission: 2024 11:18 PM  Date of Discharge: 2024  Primary Care Physician: Hayley Castellanos APRN    Consults       Date and Time Order Name Status Description    2024 12:10 PM Inpatient Nephrology Consult Completed     2024  3:25 AM Inpatient Infectious Diseases Consult Completed     2024  2:19 PM Inpatient Hematology & Oncology Consult Completed     2024  9:52 AM Inpatient Nephrology Consult Completed     2024  3:19 PM Inpatient Infectious Diseases Consult Completed     2024  2:55 AM Inpatient Infectious Diseases Consult Completed             Hospital Course     Presenting Problem: Pyelonephritis    Active Hospital Problems    Diagnosis  POA    **Acute pyelonephritis [N10]  Yes    CAD (coronary artery disease) [I25.10]  Yes    Pyelonephritis [N12]  Yes    ARACELI (acute kidney injury) [N17.9]  Yes    Septic shock [A41.9, R65.21]  Yes    Primary hypertension [I10]  Yes    GERD without esophagitis [K21.9]  Yes    Malignant neoplasm of lower lobe of right lung [C34.31]  Yes    Tobacco abuse [Z72.0]  Yes    Centrilobular emphysema [J43.2]  Yes    Mixed hyperlipidemia [E78.2]  Yes      Resolved Hospital Problems   No resolved problems to display.          Hospital Course:  David Barfield is a 74 y.o. male with history of non-small cell lung ca s/p neoadjuvant chemoimmunotherapy and RLL lobectomy currently on Opdivo (last 24), HTN, emphysema, presence of port and pacemaker, current tobacco use, recent admission - &  -  for sepsis related to pyelonephritis who was admitted on  for septic shock secondary to possible another episode of acute pyelonephritis.  Patient was started on pressors and antibiotic.  Patient also developed oliguric ARACELI.  ID/nephrology/oncology consulted.  Patient now on cefepime and  "midodrine.  He was downgraded to medical floor on 05/07.     Acute pyelonephritis.  Recurrent  ARACELI.  Likely ATN.  Improving   Septic shock secondary to the above.  Resolved  Metabolic acidosis/hyponatremia.  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  Continue breathing treatment     Plan:  Afebrile.  Blood cultures with no growth to date.  White blood count is trending down.TTE negative.  Continue IV antibiotic.  Patient will need 4 weeks of IV antibiotic through 06/03.  PICC line is contraindicated in the setting of advanced CKD and questionable need for dialysis.  to come to [facility] office for IVAB.  Okay from oncology standpoint to utilize the port, but they would prefer that port needle access/exchange is performed at hospital or infusion center rather than through home health.         Blood pressure has been in 140s 150s.  Switch midodrine 5 mg to as needed if MAP less than 5 monitor urine output.      Nephrology on board.  Kidney function improving today.  Decent urine output.  Hopefully his kidneys will continue to recover.  And was to continue to monitor patient over the weekend but he wants to go home.  Discussed with nephrology.  Okay to discharge the patient with BMP in 3 to 5 days.      Oncology on board.  Hold further therapy in the setting of acute infection and ARACELI.  Continue breathing treatment.      Addendum;  Overall patient is doing better.  Patient wants to go home today.  Nephrology recommended to keep the patient 1 more day to make sure his kidney function continue to improve.  Patient/significantother adamant about going home today to celebrate mothers day.  Okay to discharge from nephrology standpoint with repeated BMP within 3 to 5 days.  Patient will come to the infusion center for daily antibiotic through 06/03.  Per Dr. Ashley's note \"it is okay from my standpoint to utilize the port, but I would prefer that port needle access/exchange is performed at " hospital or infusion center rather than through home health.       Okay to discharge from medical standpoint as long as the inpatient infusion is sat up.  Per Dr. Barry's note:     Cefepime 2g IV q24h. Can infuse INPAT at [facility] through mediport if ok with oncology. Tentatively planning to continue this antibiotic for 4 weeks until 6/3/24.  Weekly CBC, CMP, CRP  Follow-up in my clinic within 1 week of discharge  Please fax a copy of my orders to [facility] at [phone] and call [phone] with final discharge plans    Discharge Follow Up Recommendations for outpatient labs/diagnostics:  Follow-up with PCP in 3 to 5 days.  With BMP    Day of Discharge     HPI:   Was seen and examined today.  Afebrile.  Creatinine is trending down good urine output.    Review of Systems  No fever, chills, chest pain, shortness of breath or abdominal pain.    Vital Signs:   Temp:  [97 °F (36.1 °C)-98.2 °F (36.8 °C)] 97 °F (36.1 °C)  Heart Rate:  [70-79] 78  Resp:  [16-18] 16  BP: (130-150)/(74-87) 140/85      Physical Exam:  Constitutional:       Appearance: Normal appearance.   HENT:      Head: Normocephalic and atraumatic.   Cardiovascular:      Rate and Rhythm: Normal rate.   Pulmonary:      Effort: Pulmonary effort is normal.   Abdominal:      General: Abdomen is flat. Bowel sounds are normal.   Skin:     General: Skin is warm.   Neurological:      General: No focal deficit present.      Mental Status: He is alert. Mental status is at baseline.   Psychiatric:         Mood and Affect: Mood diane    Pertinent  and/or Most Recent Results     LAB RESULTS:      Lab 05/11/24  0421 05/10/24  0324 05/09/24  0340 05/08/24  0413 05/07/24  0502 05/06/24  1152 05/06/24  0909 05/06/24  0518 05/06/24  0035 05/05/24  2352   WBC 11.07* 10.73 11.78* 16.54* 28.99*  --   --   --   --  33.46*   HEMOGLOBIN 9.2* 8.7* 8.4* 9.1* 9.4*  --   --   --   --  12.0*   HEMOGLOBIN, POC  --   --   --   --   --   --   --   --    < >  --    HEMATOCRIT 27.8* 25.9*  25.5* 27.9* 29.0*  --   --   --   --  38.9   HEMATOCRIT POC  --   --   --   --   --   --   --   --    < >  --    PLATELETS 216 192 199 202 175  --   --   --   --  245   NEUTROS ABS 7.64* 7.90* 9.31* 13.85* 26.18*  --   --   --   --  29.67*   IMMATURE GRANS (ABS) 0.09* 0.04 0.07* 0.09* 0.22*  --   --   --   --  0.34*   LYMPHS ABS 1.41 1.12 0.87 0.80 0.96  --   --   --   --  1.32   MONOS ABS 1.70* 1.45* 1.28* 1.47* 1.26*  --   --   --   --  1.94*   EOS ABS 0.17 0.17 0.19 0.25 0.25  --   --   --   --  0.05   MCV 88.3 87.2 88.9 92.7 91.5  --   --   --   --  95.3   PROCALCITONIN  --   --   --   --   --   --   --   --   --  1.89*   LACTATE  --   --   --   --   --  1.7 2.8* 2.1*  --  3.8*   PROTIME  --   --   --  18.0*  --   --   --   --   --   --     < > = values in this interval not displayed.         Lab 05/12/24  0402 05/11/24  0421 05/10/24  0324 05/09/24  0340 05/08/24  0413 05/07/24  0502 05/06/24  0035 05/05/24  2352   SODIUM 139 138 133* 132* 128* 131*   < > 141   POTASSIUM 4.1 3.7 4.2 4.1 4.5 4.5   < > 4.1   CHLORIDE 97* 95* 92* 98 97* 99   < > 106   CO2 27.0 29.0 24.0 20.0* 16.0* 17.0*   < > 20.0*   ANION GAP 15.0 14.0 17.0* 14.0 15.0 15.0   < > 15.0   BUN 39* 44* 49* 51* 47* 36*   < > 23   CREATININE 5.99* 7.08* 7.64* 7.72* 6.37* 5.11*   < > 2.80*   EGFR 9.2* 7.5* 6.8* 6.8* 8.5* 11.1*   < > 22.8*   GLUCOSE 99 116* 108* 97 90 93   < > 114*   CALCIUM 8.9 8.6 8.4* 8.7 8.4* 8.2*   < > 8.7   MAGNESIUM  --   --   --  2.6*  --  2.3  --  1.4*   PHOSPHORUS 5.1* 5.1* 5.9* 5.0* 4.4 3.1   < >  --     < > = values in this interval not displayed.         Lab 05/12/24  0402 05/11/24  0421 05/10/24  0324 05/09/24  0340 05/08/24  0413 05/07/24  0502 05/05/24  2352   TOTAL PROTEIN  --   --   --   --   --  6.1 7.1   ALBUMIN 3.4* 3.1* 2.7* 2.8* 2.5* 2.7* 3.2*   GLOBULIN  --   --   --   --   --  3.4 3.9   ALT (SGPT)  --   --   --   --   --  13 22   AST (SGOT)  --   --   --   --   --  18 30   BILIRUBIN  --   --   --   --   --  0.3  0.4   ALK PHOS  --   --   --   --   --  72 76   LIPASE  --   --   --   --   --   --  30         Lab 05/08/24  0413 05/06/24  0207 05/05/24 2352   HSTROP T  --  37* 30*   PROTIME 18.0*  --   --    INR 1.47*  --   --              Lab 05/05/24  2352   ABO TYPING A   RH TYPING Negative   ANTIBODY SCREEN Negative         Brief Urine Lab Results  (Last result in the past 365 days)        Color   Clarity   Blood   Leuk Est   Nitrite   Protein   CREAT   Urine HCG        05/06/24 1206             60.0         05/06/24 1206 Yellow   Cloudy   Trace   Small (1+)   Negative   >=300 mg/dL (3+)                 Microbiology Results (last 10 days)       Procedure Component Value - Date/Time    Gastrointestinal Panel, PCR - Stool, Per Rectum [[id number]]  (Normal) Collected: 05/07/24 0509    Lab Status: Final result Specimen: Stool from Per Rectum Updated: 05/07/24 0751     Campylobacter Not Detected     Plesiomonas shigelloides Not Detected     Salmonella Not Detected     Vibrio Not Detected     Vibrio cholerae Not Detected     Yersinia enterocolitica Not Detected     Enteroaggregative E. coli (EAEC) Not Detected     Enteropathogenic E. coli (EPEC) Not Detected     Enterotoxigenic E. coli (ETEC) lt/st Not Detected     Shiga-like toxin-producing E. coli (STEC) stx1/stx2 Not Detected     Shigella/Enteroinvasive E. coli (EIEC) Not Detected     Cryptosporidium Not Detected     Cyclospora cayetanensis Not Detected     Entamoeba histolytica Not Detected     Giardia lamblia Not Detected     Adenovirus F40/41 Not Detected     Astrovirus Not Detected     Norovirus GI/GII Not Detected     Rotavirus A Not Detected     Sapovirus (I, II, IV or V) Not Detected    Blood Culture - Blood, Wrist, Right [[id number]]  (Normal) Collected: 05/06/24 1228    Lab Status: Final result Specimen: Blood from Wrist, Right Updated: 05/11/24 0100     Blood Culture No growth at 5 days    Urine Culture - Urine, Urine, Clean Catch [[id number]]  (Normal) Collected:  05/06/24 1206    Lab Status: Final result Specimen: Urine, Clean Catch Updated: 05/07/24 0956     Urine Culture No growth    Eosinophil Smear - Urine, Urine, Clean Catch [[id number]]  (Normal) Collected: 05/06/24 1206    Lab Status: Final result Specimen: Urine, Clean Catch Updated: 05/06/24 1716     Eosinophil Smear 0 % EOS/100 Cells     Narrative:      No eosinophil seen    MRSA Screen, PCR (Inpatient) - Swab, Nares [[id number]]  (Normal) Collected: 05/06/24 0656    Lab Status: Final result Specimen: Swab from Nares Updated: 05/06/24 0831     MRSA PCR Negative    Narrative:      The negative predictive value of this diagnostic test is high and should only be used to consider de-escalating anti-MRSA therapy. A positive result may indicate colonization with MRSA and must be correlated clinically.  MRSA Negative    Legionella Antigen, Urine - Urine, Urine, Clean Catch [[id number]]  (Normal) Collected: 05/06/24 0507    Lab Status: Final result Specimen: Urine, Clean Catch Updated: 05/06/24 0940     LEGIONELLA ANTIGEN, URINE Negative    S. Pneumo Ag Urine or CSF - Urine, Urine, Clean Catch [[id number]]  (Normal) Collected: 05/06/24 0507    Lab Status: Final result Specimen: Urine, Clean Catch Updated: 05/06/24 0940     Strep Pneumo Ag Negative    Blood Culture - Blood, Arm, Right [[id number]]  (Normal) Collected: 05/06/24 0054    Lab Status: Final result Specimen: Blood from Arm, Right Updated: 05/11/24 0115     Blood Culture No growth at 5 days    COVID PRE-OP / PRE-PROCEDURE SCREENING ORDER (NO ISOLATION) - Swab, Nasopharynx [[id number]]  (Normal) Collected: 05/05/24 2353    Lab Status: Final result Specimen: Swab from Nasopharynx Updated: 05/06/24 0102    Narrative:      The following orders were created for panel order COVID PRE-OP / PRE-PROCEDURE SCREENING ORDER (NO ISOLATION) - Swab, Nasopharynx.  Procedure                               Abnormality         Status                     ---------                                -----------         ------                     Respiratory Panel PCR w/...[[id number]]  Normal              Final result                 Please view results for these tests on the individual orders.    Respiratory Panel PCR w/COVID-19(SARS-CoV-2) [facility] In-House, NP Swab in UTM/VTM, 2 HR TAT - Swab, Nasopharynx [[id number]]  (Normal) Collected: 05/05/24 5632    Lab Status: Final result Specimen: Swab from Nasopharynx Updated: 05/06/24 0102     ADENOVIRUS, PCR Not Detected     Coronavirus 229E Not Detected     Coronavirus HKU1 Not Detected     Coronavirus NL63 Not Detected     Coronavirus OC43 Not Detected     COVID19 Not Detected     Human Metapneumovirus Not Detected     Human Rhinovirus/Enterovirus Not Detected     Influenza A PCR Not Detected     Influenza B PCR Not Detected     Parainfluenza Virus 1 Not Detected     Parainfluenza Virus 2 Not Detected     Parainfluenza Virus 3 Not Detected     Parainfluenza Virus 4 Not Detected     RSV, PCR Not Detected     Bordetella pertussis pcr Not Detected     Bordetella parapertussis PCR Not Detected     Chlamydophila pneumoniae PCR Not Detected     Mycoplasma pneumo by PCR Not Detected    Narrative:      In the setting of a positive respiratory panel with a viral infection PLUS a negative procalcitonin without other underlying concern for bacterial infection, consider observing off antibiotics or discontinuation of antibiotics and continue supportive care. If the respiratory panel is positive for atypical bacterial infection (Bordetella pertussis, Chlamydophila pneumoniae, or Mycoplasma pneumoniae), consider antibiotic de-escalation to target atypical bacterial infection.            XR Chest 1 View    Result Date: 5/11/2024  XR CHEST 1 VW Date of Exam: 5/11/2024 12:11 AM EDT Indication: dyspnea Comparison: 5/6/2024. Findings: There are no airspace consolidations. Stable scarring present within the right lung base with stable small right-sided pleural  effusion. Stable right subclavian AICD/pacemaker device. Mild linear atelectatic changes are present within the left lung base which appear new.. No pneumothorax. The pulmonary vasculature appears within normal limits. The cardiac and mediastinal silhouette appear unremarkable. No acute osseous abnormality identified.     Impression: New mild linear left basilar atelectasis. Otherwise, stable with redemonstration of small right-sided pleural effusion.. Electronically Signed: Desiree Matthews MD  5/11/2024 12:25 AM EDT  Workstation ID: [id number]    Adult Transesophageal Echo 3D (DAMIÁN) W/ Cont If Necessary Per Protocol    Result Date: 5/9/2024    Left ventricular systolic function is normal. Left ventricular ejection fraction appears to be 61 - 65%. Normal left ventricular cavity size noted. Left ventricular wall thickness is consistent with mild concentric hypertrophy. All left ventricular wall segments contract normally.   There is mild calcification of the aortic valve. The aortic valve appears trileaflet. Mild aortic valve regurgitation is present. No aortic valve stenosis is present. There is no evidence of an aortic valve mass is present.   There is mild calcification of the mitral valve. There is mild, bileaflet mitral valve thickening present. No evidence of a mitral valve mass is present. Mild to moderate mitral valve regurgitation is present. No significant mitral valve stenosis is present.   The tricuspid valve is structurally normal with no significant stenosis present. There is no evidence of a mass on the tricuspid valve. Trace tricuspid valve regurgitation is present.   The pulmonic valve is grossly normal in structure. There is trace pulmonic valve regurgitation present.   No vegetation seen on atrial aspect of pacemaker leads.  The most distal aspects of the RV lead were not completely visualized however there was no apparent vegetation in the more proximal segment, adjacent to the tricuspid valve which  appears to be functioning normally.     CT Abdomen Pelvis Without Contrast    Result Date: 5/6/2024  CT ABDOMEN PELVIS WO CONTRAST Date of Exam: 5/6/2024 12:29 AM EDT Indication: diarrhea, vomiting, hypotension. Comparison: None available. Technique: Axial CT images were obtained of the abdomen and pelvis without the administration of contrast. Reconstructed coronal and sagittal images were also obtained. Automated exposure control and iterative construction methods were used. Findings: Findings in the chest discussed in a separate report. No acute findings in the superficial soft tissues. No acute osseous abnormalities or destructive bone lesions. There is mild thoracolumbar spondylosis. There are minor osteophytes of both hips. The liver, gallbladder, bile ducts, pancreas, mid and distal stomach, duodenum and spleen appear unremarkable. Adrenal glands appear normal. There is marked bilateral perinephric stranding and fluid without organization. This appears confined to Gerota's  fascia. Kidney parenchyma appears homogeneous. No hydronephrosis. No urolithiasis. Mild urinary bladder wall thickening appears unchanged. No pericystic inflammation. There is mild colonic diverticulosis and mild colonic fecal retention. No small bowel distention. There is moderate aortoiliac atherosclerosis without evidence of aneurysm. No ascites, pneumoperitoneum or lymphadenopathy.     Impression: Marked bilateral perinephric stranding and fluid without hydronephrosis. Appearance is nonspecific, but could be related to pyelonephritis. Correlate with urinalysis. Electronically Signed: Raulito Crenshaw MD  5/6/2024 12:54 AM EDT  Workstation ID: [id number]    CT Chest Without Contrast Diagnostic    Result Date: 5/6/2024  CT CHEST WO CONTRAST DIAGNOSTIC Date of Exam: 5/6/2024 12:29 AM EDT Indication: hypotension, chills, vomiting. Comparison: Chest radiograph 5/5/2024 and chest CT 4/24/2024. Technique: Axial CT images were obtained of the  chest without contrast administration.  Reconstructed coronal and sagittal images were also obtained. Automated exposure control and iterative construction methods were used. Findings: There is a stable small loculated right-sided pleural effusion. There is stable scarring in the lung apices along with paraseptal emphysema. Stable subpleural interstitial disease in both lungs, right greater than left favored to be chronic. There is a small calcified cluster of granulomas in the left upper lobe. There appear to be changes of right lower lobectomy. There is no pneumothorax. No left-sided pleural effusion. No new or enlarging lung nodules. The thyroid, trachea and esophagus appear within normal limits. There is a small hiatal hernia. Heart size is normal. There are severe coronary artery calcifications. ICD leads noted in the right heart. Mild aortic atherosclerosis. No aneurysm. No mediastinal lymphadenopathy or pericardial effusion. There is a stable left-sided chest port. Stable right-sided ICD. No acute findings in the superficial soft tissues. There is new marked bilateral perinephric stranding and trace unorganized fluid. The kidneys are only partially included on this study. No  additional findings in the limited images of the upper abdomen. No acute osseous abnormality or destructive bone lesion. There are moderate lower cervical and mild diffuse thoracic degenerative changes. There are healing minimally displaced fractures of  the right lateral fifth and sixth ribs, which appears not significantly changed from the prior study. No new rib fractures.     Impression: 1. Stable small loculated right-sided pleural effusion with stable postoperative and chronic changes in both lungs. 2. Severe coronary artery disease. 3. Grossly abnormal appearance of partially visualized kidneys in the upper retroperitoneum. Appearance could be due to infection, inflammation or obstruction. Recommend dedicated imaging of the  abdomen and pelvis ideally with IV contrast. 4. Small hiatal hernia. 5. Stable appearance of healing right lateral fifth and sixth rib fractures. Electronically Signed: Raulito Crenshaw MD  5/6/2024 12:49 AM EDT  Workstation ID: [id number]    XR Chest 1 View    Result Date: 5/6/2024  XR CHEST 1 VW Date of Exam: 5/5/2024 11:47 PM EDT Indication: hypotension Comparison: 4/24/2024 and chest CT same date. Findings: Stable volume loss in the right lung. Stable small right-sided pleural effusion and right basilar scarring. Left lung remains clear. Stable diffuse interstitial prominence in the lungs. No pneumothorax or new lung opacity. Stable right-sided multi lead ICD. Heart size is unchanged. Pulmonary vasculature is within normal limits. No acute osseous abnormalities. Stable left-sided chest port.     Impression: Stable chronic and postoperative changes and small right-sided pleural effusion. Electronically Signed: Raulito Crenshaw MD  5/6/2024 12:21 AM EDT  Workstation ID: [id number]             Results for orders placed during the hospital encounter of 05/05/24    Adult Transesophageal Echo 3D (DAMIÁN) W/ Cont If Necessary Per Protocol    Interpretation Summary    Left ventricular systolic function is normal. Left ventricular ejection fraction appears to be 61 - 65%. Normal left ventricular cavity size noted. Left ventricular wall thickness is consistent with mild concentric hypertrophy. All left ventricular wall segments contract normally.    There is mild calcification of the aortic valve. The aortic valve appears trileaflet. Mild aortic valve regurgitation is present. No aortic valve stenosis is present. There is no evidence of an aortic valve mass is present.    There is mild calcification of the mitral valve. There is mild, bileaflet mitral valve thickening present. No evidence of a mitral valve mass is present. Mild to moderate mitral valve regurgitation is present. No significant mitral valve stenosis is present.    The  tricuspid valve is structurally normal with no significant stenosis present. There is no evidence of a mass on the tricuspid valve. Trace tricuspid valve regurgitation is present.    The pulmonic valve is grossly normal in structure. There is trace pulmonic valve regurgitation present.    No vegetation seen on atrial aspect of pacemaker leads.  The most distal aspects of the RV lead were not completely visualized however there was no apparent vegetation in the more proximal segment, adjacent to the tricuspid valve which appears to be functioning normally.      Plan for Follow-up of Pending Labs/Results:   Pending Labs       Order Current Status    Brucella Antibody IgG / IgM In process          Discharge Details        Discharge Medications        New Medications        Instructions Start Date   cefepime 2000 mg IVPB in 100 mL NS (MBP)   2,000 mg, Intravenous, Every 24 Hours   Start Date: May 13, 2024            Continue These Medications        Instructions Start Date   albuterol sulfate  (90 Base) MCG/ACT inhaler  Commonly known as: PROVENTIL HFA;VENTOLIN HFA;PROAIR HFA   2 puffs, Inhalation, Every 4 Hours PRN      amLODIPine 5 MG tablet  Commonly known as: NORVASC   5 mg, Oral, Every 24 Hours Scheduled      ferrous sulfate 325 (65 FE) MG tablet   325 mg, Oral, Daily With Breakfast, OTC      fluticasone 27.5 MCG/SPRAY nasal spray  Commonly known as: VERAMYST   2 sprays, Nasal, Once As Needed      HYDROcodone-acetaminophen 7.5-325 MG per tablet  Commonly known as: Norco   1 tablet, Oral, Every 6 Hours PRN      lidocaine-prilocaine 2.5-2.5 % cream  Commonly known as: EMLA   1 application , Topical, As Needed      omeprazole 40 MG capsule  Commonly known as: priLOSEC   40 mg, Oral, Daily      ondansetron 8 MG tablet  Commonly known as: ZOFRAN   8 mg, Oral, 3 Times Daily PRN      pravastatin 80 MG tablet  Commonly known as: PRAVACHOL   80 mg, Oral, Nightly      sildenafil 100 MG tablet  Commonly known as:  "VIAGRA   50 mg, Oral, Daily PRN      Trelegy Ellipta 100-62.5-25 MCG/ACT inhaler  Generic drug: Fluticasone-Umeclidin-Vilant   1 puff, Inhalation, Daily - RT      vitamin b complex capsule capsule   1 capsule, Oral, Daily, OTC             Stop These Medications      levoFLOXacin 500 MG tablet  Commonly known as: Levaquin     levoFLOXacin 750 MG tablet  Commonly known as: Levaquin              Allergies   Allergen Reactions    Cymbalta [Duloxetine Hcl] GI Intolerance    Gabapentin Mental Status Change     Pt states that this medication \"makes him feel foolish in his head\".     Remeron [Mirtazapine] Other (See Comments)     Excess sedation    Toradol [Ketorolac Tromethamine] GI Intolerance     Projectile vomiting     Latex Other (See Comments)     Latex allergy     Tape Rash         Discharge Disposition:  Home or Self Care    Diet:  Hospital:  Diet Order   Procedures    Diet: Renal; Low Potassium, Low Sodium (2-3g); Fluid Consistency: Thin (IDDSI 0)            Activity: As tolerated      Restrictions or Other Recommendations:         CODE STATUS:    Code Status and Medical Interventions:   Ordered at: 05/06/24 0331     Code Status (Patient has no pulse and is not breathing):    CPR (Attempt to Resuscitate)     Medical Interventions (Patient has pulse or is breathing):    Full Support       Future Appointments   Date Time Provider Department Center   6/3/2024  4:45 PM Hayley Castellanos APRN [facility]   7/10/2024 11:00 AM Kayy Box MD [facility]   8/19/2024  1:30 PM [facility], [facility]   8/19/2024  2:15 PM Octaviano Sampson MD [facility]                 Kael Wiggins MD  05/12/24      Time Spent on Discharge:  I spent 35 minutes on this discharge activity which included: face-to-face encounter with the patient, reviewing the data in the system, coordination of the care with the nursing staff as well as consultants, documentation, and entering orders.  "

## 2024-05-12 NOTE — PLAN OF CARE
Problem: Adult Inpatient Plan of Care  Goal: Absence of Hospital-Acquired Illness or Injury  Outcome: Ongoing, Progressing  Intervention: Identify and Manage Fall Risk  Recent Flowsheet Documentation  Taken 5/12/2024 0400 by Khushboo Canada RN  Safety Promotion/Fall Prevention:   activity supervised   assistive device/personal items within reach   clutter free environment maintained   fall prevention program maintained   nonskid shoes/slippers when out of bed   safety round/check completed  Taken 5/12/2024 0000 by Khushboo Canada RN  Safety Promotion/Fall Prevention: safety round/check completed  Taken 5/11/2024 2000 by Khushboo Canada RN  Safety Promotion/Fall Prevention:   activity supervised   assistive device/personal items within reach   clutter free environment maintained   fall prevention program maintained   nonskid shoes/slippers when out of bed   safety round/check completed  Intervention: Prevent Skin Injury  Recent Flowsheet Documentation  Taken 5/12/2024 0400 by Khushboo Canada RN  Body Position: position changed independently  Skin Protection:   adhesive use limited   incontinence pads utilized   tubing/devices free from skin contact   transparent dressing maintained  Taken 5/12/2024 0000 by Khushboo Canada RN  Body Position: position changed independently  Skin Protection:   adhesive use limited   incontinence pads utilized   transparent dressing maintained   tubing/devices free from skin contact  Taken 5/11/2024 2000 by Khushboo Canada RN  Body Position: position changed independently  Skin Protection:   adhesive use limited   incontinence pads utilized   transparent dressing maintained   tubing/devices free from skin contact  Intervention: Prevent and Manage VTE (Venous Thromboembolism) Risk  Recent Flowsheet Documentation  Taken 5/12/2024 0400 by Khushboo Canada RN  Activity Management: activity minimized  Taken 5/12/2024 0000 by Khushboo Canada RN  Activity Management: activity encouraged  Taken  5/11/2024 2000 by Khushboo Canada RN  Activity Management: activity encouraged  VTE Prevention/Management: (see MAR) other (see comments)  Range of Motion: active ROM (range of motion) encouraged  Intervention: Prevent Infection  Recent Flowsheet Documentation  Taken 5/12/2024 0400 by Khushboo Canada RN  Infection Prevention:   cohorting utilized   environmental surveillance performed   rest/sleep promoted  Taken 5/12/2024 0000 by Khushboo Canada RN  Infection Prevention: rest/sleep promoted  Taken 5/11/2024 2000 by Khushboo Canada RN  Infection Prevention:   cohorting utilized   environmental surveillance performed   rest/sleep promoted   Goal Outcome Evaluation:  Plan of Care Reviewed With: patient        Progress: improving

## 2024-05-12 NOTE — PLAN OF CARE
"Overall patient is doing better.  Patient wants to go home today.  Nephrology recommended to keep the patient 1 more day to make sure his kidney function continue to improve.  Patient/significantother adamant about going home today to celebrate mothers day.  Okay to discharge from nephrology standpoint with repeated BMP within 3 to 5 days.  Patient will come to the infusion center for daily antibiotic through 06/03.  Per Dr. Ashley's note \"it is okay from my standpoint to utilize the port, but I would prefer that port needle access/exchange is performed at hospital or infusion center rather than through home health.      Okay to discharge from medical standpoint as long as the inpatient infusion is sat up.  Per Dr. Barry's note:    Cefepime 2g IV q24h. Can infuse INPAT at [facility] through [facility] if ok with oncology. Tentatively planning to continue this antibiotic for 4 weeks until 6/3/24.  Weekly CBC, CMP, CRP  Follow-up in my clinic within 1 week of discharge  Please fax a copy of my orders to [facility] at [phone] and call [phone] with final discharge plans   "

## 2024-05-12 NOTE — PLAN OF CARE
Problem: Adult Inpatient Plan of Care  Goal: Plan of Care Review  Outcome: Met  Goal: Patient-Specific Goal (Individualized)  Outcome: Met  Goal: Absence of Hospital-Acquired Illness or Injury  Outcome: Met  Intervention: Identify and Manage Fall Risk  Recent Flowsheet Documentation  Taken 5/12/2024 1200 by Gin Singh RN  Safety Promotion/Fall Prevention:   assistive device/personal items within reach   clutter free environment maintained   nonskid shoes/slippers when out of bed   room organization consistent   safety round/check completed  Taken 5/12/2024 0854 by Gin Singh RN  Safety Promotion/Fall Prevention:   activity supervised   assistive device/personal items within reach   clutter free environment maintained   nonskid shoes/slippers when out of bed   room organization consistent   safety round/check completed  Intervention: Prevent Skin Injury  Recent Flowsheet Documentation  Taken 5/12/2024 1200 by Gin Singh RN  Body Position: position changed independently  Skin Protection:   adhesive use limited   tubing/devices free from skin contact  Taken 5/12/2024 0854 by Gin Singh RN  Body Position: position changed independently  Skin Protection:   adhesive use limited   tubing/devices free from skin contact  Intervention: Prevent and Manage VTE (Venous Thromboembolism) Risk  Recent Flowsheet Documentation  Taken 5/12/2024 1200 by Gin Singh RN  Activity Management: activity encouraged  Taken 5/12/2024 0854 by Gin Singh RN  Activity Management: activity encouraged  VTE Prevention/Management: (see MAR)   bilateral   sequential compression devices off   other (see comments)  Range of Motion: active ROM (range of motion) encouraged  Intervention: Prevent Infection  Recent Flowsheet Documentation  Taken 5/12/2024 1200 by Gin Singh RN  Infection Prevention: environmental surveillance performed  Taken 5/12/2024 0854 by Gin Singh RN  Infection Prevention:  environmental surveillance performed  Goal: Optimal Comfort and Wellbeing  Outcome: Met  Intervention: Monitor Pain and Promote Comfort  Recent Flowsheet Documentation  Taken 5/12/2024 0854 by Gin Singh RN  Pain Management Interventions:   care clustered   quiet environment facilitated  Intervention: Provide Person-Centered Care  Recent Flowsheet Documentation  Taken 5/12/2024 1200 by Gin Singh RN  Trust Relationship/Rapport:   care explained   questions answered   questions encouraged   thoughts/feelings acknowledged  Taken 5/12/2024 0854 by Gin Singh RN  Trust Relationship/Rapport:   care explained   questions answered   questions encouraged   thoughts/feelings acknowledged  Goal: Readiness for Transition of Care  Outcome: Met     Problem: Adjustment to Illness (Sepsis/Septic Shock)  Goal: Optimal Coping  Outcome: Met  Intervention: Optimize Psychosocial Adjustment to Illness  Recent Flowsheet Documentation  Taken 5/12/2024 1200 by Gin Singh RN  Supportive Measures:   active listening utilized   decision-making supported   self-care encouraged  Taken 5/12/2024 0854 by Gin Singh RN  Supportive Measures:   active listening utilized   self-care encouraged   verbalization of feelings encouraged  Family/Support System Care:   self-care encouraged   support provided     Problem: Bleeding (Sepsis/Septic Shock)  Goal: Absence of Bleeding  Outcome: Met     Problem: Glycemic Control Impaired (Sepsis/Septic Shock)  Goal: Blood Glucose Level Within Desired Range  Outcome: Met  Intervention: Optimize Glycemic Control  Recent Flowsheet Documentation  Taken 5/12/2024 1200 by Gin Sinhg RN  Glycemic Management: oral hydration promoted  Taken 5/12/2024 0854 by Gin Singh RN  Glycemic Management: oral hydration promoted     Problem: Infection Progression (Sepsis/Septic Shock)  Goal: Absence of Infection Signs and Symptoms  Outcome: Met  Intervention: Initiate Sepsis Management  Recent  Flowsheet Documentation  Taken 5/12/2024 1200 by Gin Singh RN  Infection Prevention: environmental surveillance performed  Taken 5/12/2024 0854 by Gin Singh RN  Infection Prevention: environmental surveillance performed  Intervention: Promote Recovery  Recent Flowsheet Documentation  Taken 5/12/2024 1200 by Gin Singh RN  Activity Management: activity encouraged  Taken 5/12/2024 0854 by Gin Singh RN  Activity Management: activity encouraged  Sleep/Rest Enhancement: relaxation techniques promoted     Problem: Nutrition Impaired (Sepsis/Septic Shock)  Goal: Optimal Nutrition Intake  Outcome: Met     Problem: Skin Injury Risk Increased  Goal: Skin Health and Integrity  Outcome: Met  Intervention: Optimize Skin Protection  Recent Flowsheet Documentation  Taken 5/12/2024 1200 by Gin Singh RN  Pressure Reduction Techniques: frequent weight shift encouraged  Head of Bed (HOB) Positioning: HOB elevated  Pressure Reduction Devices:   pressure-redistributing mattress utilized   positioning supports utilized  Skin Protection:   adhesive use limited   tubing/devices free from skin contact  Taken 5/12/2024 0854 by Gin Singh RN  Pressure Reduction Techniques: frequent weight shift encouraged  Head of Bed (HOB) Positioning: HOB elevated  Pressure Reduction Devices:   pressure-redistributing mattress utilized   positioning supports utilized  Skin Protection:   adhesive use limited   tubing/devices free from skin contact     Problem: Fall Injury Risk  Goal: Absence of Fall and Fall-Related Injury  Outcome: Met  Intervention: Identify and Manage Contributors  Recent Flowsheet Documentation  Taken 5/12/2024 1200 by Gin Singh RN  Medication Review/Management: medications reviewed  Self-Care Promotion: independence encouraged  Taken 5/12/2024 0854 by Gin Singh RN  Medication Review/Management: medications reviewed  Self-Care Promotion: independence encouraged  Intervention: Promote  Injury-Free Environment  Recent Flowsheet Documentation  Taken 5/12/2024 1200 by Gin Singh, RN  Safety Promotion/Fall Prevention:   assistive device/personal items within reach   clutter free environment maintained   nonskid shoes/slippers when out of bed   room organization consistent   safety round/check completed  Taken 5/12/2024 0854 by Gin Singh, RN  Safety Promotion/Fall Prevention:   activity supervised   assistive device/personal items within reach   clutter free environment maintained   nonskid shoes/slippers when out of bed   room organization consistent   safety round/check completed   Goal Outcome Evaluation:

## 2024-05-12 NOTE — CASE MANAGEMENT/SOCIAL WORK
Case Management Discharge Note      Final Note: I spoke with Mr Barfield at bedside.  Mr Barfield is agreeable to returning home and coming back daily for antibiotic infusions.  I paged Dr. Landrum and he stated that it was ok for [facility] to use Mr Barfield's port.  I have paged Ashley/[facility] RN on call and she has given me the appointment time of 1500 for his first antibiotic infusion.  At this time there are no other discharge needs.         Selected Continued Care - Admitted Since 5/5/2024       Destination    No services have been selected for the patient.                Durable Medical Equipment    No services have been selected for the patient.                Dialysis/Infusion    No services have been selected for the patient.                Home Medical Care    No services have been selected for the patient.                Therapy    No services have been selected for the patient.                Community Resources    No services have been selected for the patient.                Community & DME    No services have been selected for the patient.                         Final Discharge Disposition Code: 01 - home or self-care

## 2024-05-12 NOTE — PROGRESS NOTES
[facility] Medicine Services  PROGRESS NOTE    Patient Name: David Barfield  : 1950  MRN: [id number]    Date of Admission: 2024  Primary Care Physician: Hayley Castellanos APRN    Subjective   Subjective     CC:  Septic shock    HPI:  No acute events over the night.  Hemodynamically stable.  Creatinine is trending down to 5 from 7 still making decent amount of urine.  Nephrology on board.  No plan start hemodialysis yet.  Continue to monitor over the weekend.      Objective   Objective     Vital Signs:   Temp:  [96.8 °F (36 °C)-98.2 °F (36.8 °C)] 97 °F (36.1 °C)  Heart Rate:  [70-79] 78  Resp:  [16-18] 16  BP: (128-150)/(74-88) 140/85     Physical Exam:  Physical Exam  Vitals and nursing note reviewed.   Constitutional:       Appearance: Normal appearance.   HENT:      Head: Normocephalic and atraumatic.   Cardiovascular:      Rate and Rhythm: Normal rate.   Pulmonary:      Effort: Pulmonary effort is normal.   Abdominal:      General: Abdomen is flat. Bowel sounds are normal.   Skin:     General: Skin is warm.   Neurological:      General: No focal deficit present.      Mental Status: He is alert. Mental status is at baseline.   Psychiatric:         Mood and Affect: Mood normal.          Results Reviewed:  LAB RESULTS:      Lab 24  0421 05/10/24  0324 24  0340 24  0413 24  0502 24  1152 24  0909 24  0518 24  0035 24  2352   WBC 11.07* 10.73 11.78* 16.54* 28.99*  --   --   --   --  33.46*   HEMOGLOBIN 9.2* 8.7* 8.4* 9.1* 9.4*  --   --   --   --  12.0*   HEMOGLOBIN, POC  --   --   --   --   --   --   --   --    < >  --    HEMATOCRIT 27.8* 25.9* 25.5* 27.9* 29.0*  --   --   --   --  38.9   HEMATOCRIT POC  --   --   --   --   --   --   --   --    < >  --    PLATELETS 216 192 199 202 175  --   --   --   --  245   NEUTROS ABS 7.64* 7.90* 9.31* 13.85* 26.18*  --   --   --   --  29.67*   IMMATURE GRANS (ABS) 0.09* 0.04 0.07*  0.09* 0.22*  --   --   --   --  0.34*   LYMPHS ABS 1.41 1.12 0.87 0.80 0.96  --   --   --   --  1.32   MONOS ABS 1.70* 1.45* 1.28* 1.47* 1.26*  --   --   --   --  1.94*   EOS ABS 0.17 0.17 0.19 0.25 0.25  --   --   --   --  0.05   MCV 88.3 87.2 88.9 92.7 91.5  --   --   --   --  95.3   PROCALCITONIN  --   --   --   --   --   --   --   --   --  1.89*   LACTATE  --   --   --   --   --  1.7 2.8* 2.1*  --  3.8*   PROTIME  --   --   --  18.0*  --   --   --   --   --   --     < > = values in this interval not displayed.         Lab 05/12/24  0402 05/11/24  0421 05/10/24  0324 05/09/24  0340 05/08/24  0413 05/07/24  0502 05/06/24  0035 05/05/24  2352   SODIUM 139 138 133* 132* 128* 131*   < > 141   POTASSIUM 4.1 3.7 4.2 4.1 4.5 4.5   < > 4.1   CHLORIDE 97* 95* 92* 98 97* 99   < > 106   CO2 27.0 29.0 24.0 20.0* 16.0* 17.0*   < > 20.0*   ANION GAP 15.0 14.0 17.0* 14.0 15.0 15.0   < > 15.0   BUN 39* 44* 49* 51* 47* 36*   < > 23   CREATININE 5.99* 7.08* 7.64* 7.72* 6.37* 5.11*   < > 2.80*   EGFR 9.2* 7.5* 6.8* 6.8* 8.5* 11.1*   < > 22.8*   GLUCOSE 99 116* 108* 97 90 93   < > 114*   CALCIUM 8.9 8.6 8.4* 8.7 8.4* 8.2*   < > 8.7   MAGNESIUM  --   --   --  2.6*  --  2.3  --  1.4*   PHOSPHORUS 5.1* 5.1* 5.9* 5.0* 4.4 3.1   < >  --     < > = values in this interval not displayed.         Lab 05/12/24  0402 05/11/24  0421 05/10/24  0324 05/09/24  0340 05/08/24 0413 05/07/24  0502 05/05/24 2352   TOTAL PROTEIN  --   --   --   --   --  6.1 7.1   ALBUMIN 3.4* 3.1* 2.7* 2.8* 2.5* 2.7* 3.2*   GLOBULIN  --   --   --   --   --  3.4 3.9   ALT (SGPT)  --   --   --   --   --  13 22   AST (SGOT)  --   --   --   --   --  18 30   BILIRUBIN  --   --   --   --   --  0.3 0.4   ALK PHOS  --   --   --   --   --  72 76   LIPASE  --   --   --   --   --   --  30         Lab 05/08/24 0413 05/06/24  0207 05/05/24 2352   HSTROP T  --  37* 30*   PROTIME 18.0*  --   --    INR 1.47*  --   --                Lab 05/05/24 2352   ABO TYPING A   RH TYPING  Negative   ANTIBODY SCREEN Negative         Brief Urine Lab Results  (Last result in the past 365 days)        Color   Clarity   Blood   Leuk Est   Nitrite   Protein   CREAT   Urine HCG        05/06/24 1206             60.0         05/06/24 1206 Yellow   Cloudy   Trace   Small (1+)   Negative   >=300 mg/dL (3+)                   Microbiology Results Abnormal       Procedure Component Value - Date/Time    Blood Culture - Blood, Arm, Right [[id number]]  (Normal) Collected: 05/06/24 0054    Lab Status: Final result Specimen: Blood from Arm, Right Updated: 05/11/24 0115     Blood Culture No growth at 5 days    Blood Culture - Blood, Wrist, Right [[id number]]  (Normal) Collected: 05/06/24 1228    Lab Status: Final result Specimen: Blood from Wrist, Right Updated: 05/11/24 0100     Blood Culture No growth at 5 days    Urine Culture - Urine, Urine, Clean Catch [[id number]]  (Normal) Collected: 05/06/24 1206    Lab Status: Final result Specimen: Urine, Clean Catch Updated: 05/07/24 0956     Urine Culture No growth    Gastrointestinal Panel, PCR - Stool, Per Rectum [[id number]]  (Normal) Collected: 05/07/24 0509    Lab Status: Final result Specimen: Stool from Per Rectum Updated: 05/07/24 0751     Campylobacter Not Detected     Plesiomonas shigelloides Not Detected     Salmonella Not Detected     Vibrio Not Detected     Vibrio cholerae Not Detected     Yersinia enterocolitica Not Detected     Enteroaggregative E. coli (EAEC) Not Detected     Enteropathogenic E. coli (EPEC) Not Detected     Enterotoxigenic E. coli (ETEC) lt/st Not Detected     Shiga-like toxin-producing E. coli (STEC) stx1/stx2 Not Detected     Shigella/Enteroinvasive E. coli (EIEC) Not Detected     Cryptosporidium Not Detected     Cyclospora cayetanensis Not Detected     Entamoeba histolytica Not Detected     Giardia lamblia Not Detected     Adenovirus F40/41 Not Detected     Astrovirus Not Detected     Norovirus GI/GII Not Detected     Rotavirus A Not Detected      Sapovirus (I, II, IV or V) Not Detected    Eosinophil Smear - Urine, Urine, Clean Catch [[id number]]  (Normal) Collected: 05/06/24 1206    Lab Status: Final result Specimen: Urine, Clean Catch Updated: 05/06/24 1716     Eosinophil Smear 0 % EOS/100 Cells     Narrative:      No eosinophil seen    Legionella Antigen, Urine - Urine, Urine, Clean Catch [[id number]]  (Normal) Collected: 05/06/24 0507    Lab Status: Final result Specimen: Urine, Clean Catch Updated: 05/06/24 0940     LEGIONELLA ANTIGEN, URINE Negative    S. Pneumo Ag Urine or CSF - Urine, Urine, Clean Catch [[id number]]  (Normal) Collected: 05/06/24 0507    Lab Status: Final result Specimen: Urine, Clean Catch Updated: 05/06/24 0940     Strep Pneumo Ag Negative    MRSA Screen, PCR (Inpatient) - Swab, Nares [[id number]]  (Normal) Collected: 05/06/24 0656    Lab Status: Final result Specimen: Swab from Nares Updated: 05/06/24 0831     MRSA PCR Negative    Narrative:      The negative predictive value of this diagnostic test is high and should only be used to consider de-escalating anti-MRSA therapy. A positive result may indicate colonization with MRSA and must be correlated clinically.  MRSA Negative    COVID PRE-OP / PRE-PROCEDURE SCREENING ORDER (NO ISOLATION) - Swab, Nasopharynx [[id number]]  (Normal) Collected: 05/05/24 2353    Lab Status: Final result Specimen: Swab from Nasopharynx Updated: 05/06/24 0102    Narrative:      The following orders were created for panel order COVID PRE-OP / PRE-PROCEDURE SCREENING ORDER (NO ISOLATION) - Swab, Nasopharynx.  Procedure                               Abnormality         Status                     ---------                               -----------         ------                     Respiratory Panel PCR w/...[[id number]]  Normal              Final result                 Please view results for these tests on the individual orders.    Respiratory Panel PCR w/COVID-19(SARS-CoV-2) [facility]/DENA/PAD/COR/JEROME In-House,  NP Swab in UTM/VTM, 2 HR TAT - Swab, Nasopharynx [[id number]]  (Normal) Collected: 05/05/24 2353    Lab Status: Final result Specimen: Swab from Nasopharynx Updated: 05/06/24 0102     ADENOVIRUS, PCR Not Detected     Coronavirus 229E Not Detected     Coronavirus HKU1 Not Detected     Coronavirus NL63 Not Detected     Coronavirus OC43 Not Detected     COVID19 Not Detected     Human Metapneumovirus Not Detected     Human Rhinovirus/Enterovirus Not Detected     Influenza A PCR Not Detected     Influenza B PCR Not Detected     Parainfluenza Virus 1 Not Detected     Parainfluenza Virus 2 Not Detected     Parainfluenza Virus 3 Not Detected     Parainfluenza Virus 4 Not Detected     RSV, PCR Not Detected     Bordetella pertussis pcr Not Detected     Bordetella parapertussis PCR Not Detected     Chlamydophila pneumoniae PCR Not Detected     Mycoplasma pneumo by PCR Not Detected    Narrative:      In the setting of a positive respiratory panel with a viral infection PLUS a negative procalcitonin without other underlying concern for bacterial infection, consider observing off antibiotics or discontinuation of antibiotics and continue supportive care. If the respiratory panel is positive for atypical bacterial infection (Bordetella pertussis, Chlamydophila pneumoniae, or Mycoplasma pneumoniae), consider antibiotic de-escalation to target atypical bacterial infection.            XR Chest 1 View    Result Date: 5/11/2024  XR CHEST 1 VW Date of Exam: 5/11/2024 12:11 AM EDT Indication: dyspnea Comparison: 5/6/2024. Findings: There are no airspace consolidations. Stable scarring present within the right lung base with stable small right-sided pleural effusion. Stable right subclavian AICD/pacemaker device. Mild linear atelectatic changes are present within the left lung base which appear new.. No pneumothorax. The pulmonary vasculature appears within normal limits. The cardiac and mediastinal silhouette appear unremarkable. No acute  osseous abnormality identified.     Impression: Impression: New mild linear left basilar atelectasis. Otherwise, stable with redemonstration of small right-sided pleural effusion.. Electronically Signed: Desiree Matthews MD  5/11/2024 12:25 AM EDT  Workstation ID: [id number]     Results for orders placed during the hospital encounter of 05/05/24    Adult Transesophageal Echo 3D (DAMIÁN) W/ Cont If Necessary Per Protocol    Interpretation Summary    Left ventricular systolic function is normal. Left ventricular ejection fraction appears to be 61 - 65%. Normal left ventricular cavity size noted. Left ventricular wall thickness is consistent with mild concentric hypertrophy. All left ventricular wall segments contract normally.    There is mild calcification of the aortic valve. The aortic valve appears trileaflet. Mild aortic valve regurgitation is present. No aortic valve stenosis is present. There is no evidence of an aortic valve mass is present.    There is mild calcification of the mitral valve. There is mild, bileaflet mitral valve thickening present. No evidence of a mitral valve mass is present. Mild to moderate mitral valve regurgitation is present. No significant mitral valve stenosis is present.    The tricuspid valve is structurally normal with no significant stenosis present. There is no evidence of a mass on the tricuspid valve. Trace tricuspid valve regurgitation is present.    The pulmonic valve is grossly normal in structure. There is trace pulmonic valve regurgitation present.    No vegetation seen on atrial aspect of pacemaker leads.  The most distal aspects of the RV lead were not completely visualized however there was no apparent vegetation in the more proximal segment, adjacent to the tricuspid valve which appears to be functioning normally.      Current medications:  Scheduled Meds:budesonide-formoterol, 2 puff, Inhalation, BID - RT   And  tiotropium bromide monohydrate, 2 puff, Inhalation, Daily -  RT  cefepime, 2,000 mg, Intravenous, Q24H  famotidine, 20 mg, Oral, Daily  heparin (porcine), 5,000 Units, Subcutaneous, Q12H  Lidocaine, 1 patch, Transdermal, Q24H  senna-docusate sodium, 2 tablet, Oral, BID  sodium chloride, 10 mL, Intravenous, Q12H      Continuous Infusions:     PRN Meds:.  acetaminophen **OR** acetaminophen    albuterol    senna-docusate sodium **AND** polyethylene glycol **AND** bisacodyl **AND** bisacodyl    midodrine    nitroglycerin    ondansetron ODT **OR** ondansetron    sodium chloride    sodium chloride    Assessment & Plan   Assessment & Plan     Active Hospital Problems    Diagnosis  POA    **Acute pyelonephritis [N10]  Yes    CAD (coronary artery disease) [I25.10]  Yes    Pyelonephritis [N12]  Yes    ARACELI (acute kidney injury) [N17.9]  Yes    Septic shock [A41.9, R65.21]  Yes    Primary hypertension [I10]  Yes    GERD without esophagitis [K21.9]  Yes    Malignant neoplasm of lower lobe of right lung [C34.31]  Yes    Tobacco abuse [Z72.0]  Yes    Centrilobular emphysema [J43.2]  Yes    Mixed hyperlipidemia [E78.2]  Yes      Resolved Hospital Problems   No resolved problems to display.        Brief Hospital Course to date:  David Barfield is a 74 y.o. male with history of non-small cell lung ca s/p neoadjuvant chemoimmunotherapy and RLL lobectomy currently on Opdivo (last 4/4/24), HTN, emphysema, presence of port and pacemaker, current tobacco use, recent admission 4/12-4/22 & 4/24 - 4/29 for sepsis related to pyelonephritis who was admitted on 05/05 for septic shock secondary to possible another episode of acute pyelonephritis.  Patient was started on pressors and antibiotic.  Patient also developed oliguric ARACELI.  ID/nephrology/oncology consulted.  Patient now on cefepime and midodrine.  He was downgraded to medical floor on 05/07.    Acute pyelonephritis.  Recurrent  ARACELI.  Likely ATN.  Improving   Septic shock secondary to the above.  Resolved  Metabolic  acidosis/hyponatremia.  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  Continue breathing treatment    Plan:  Afebrile.  Blood cultures with no growth to date.  White blood count is trending down.TTE negative.  Continue IV antibiotic.  Patient will need 4 weeks of IV antibiotic through 06/03.  PICC line is contraindicated in the setting of advanced CKD and questionable need for dialysis.  to come to [facility] office for IVAB.  Okay from oncology standpoint to utilize the port, but they would prefer that port needle access/exchange is performed at hospital or infusion center rather than through home health.       Blood pressure has been in 140s 150s.  Switch midodrine 5 mg to as needed if MAP less than 5 monitor urine output.     Nephrology on board.  Kidney function improving today.  Decent urine output.  No plan start hemodialysis yet.  Hopefully his kidneys will continue to recover.  Continue to monitor over the weekend.  Continue fluid management per nephrology.    Oncology on board.  Hold further therapy in the setting of acute infection and ARACELI.  Continue breathing treatment.         Expected Discharge Location and Transportation: Likely home on Monday.  Expected Discharge   Expected Discharge Date: 5/13/2024; Expected Discharge Time:      DVT prophylaxis:  Medical DVT prophylaxis orders are present.         AM-PAC 6 Clicks Score (PT): 23 (05/11/24 2000)    CODE STATUS:   Code Status and Medical Interventions:   Ordered at: 05/06/24 0331     Code Status (Patient has no pulse and is not breathing):    CPR (Attempt to Resuscitate)     Medical Interventions (Patient has pulse or is breathing):    Full Support       Kael Wiggins MD  05/12/24

## 2024-05-12 NOTE — PROGRESS NOTES
"   LOS: 6 days    Patient Care Team:  Hayley Castellanos APRN as PCP - General (Nurse Practitioner)  Octaviano Sampson MD as Consulting Physician (Pulmonary Disease)  Neetu Ashley MD as Referring Physician (Hematology and Oncology)  Nimo Rodríguez MD as Consulting Physician (Radiation Oncology)    Subjective   Chart reviewed.  Other physician notes seen.  Good urine output.  Mild improvement in renal function is noted.  Good urine output.  Review of system:  Denies any nausea, vomiting, chest pain, dysuria, hematuria.    Objective     Vital Signs:  Blood pressure 140/85, pulse 78, temperature 97 °F (36.1 °C), temperature source Axillary, resp. rate 16, height 182.9 cm (72\"), weight 76.2 kg (168 lb), SpO2 97%.      Intake/Output Summary (Last 24 hours) at 5/12/2024 1115  Last data filed at 5/12/2024 0922  Gross per 24 hour   Intake 1123 ml   Output 3850 ml   Net -2727 ml        05/11 0701 - 05/12 0700  In: 643 [P.O.:643]  Out: 3400 [Urine:3400]    Physical Exam:  General Appearance: Awake alert oriented  male no obvious distress.  Neck: Supple no JVD.  Lungs: Clear auscultation, no rales rhonchi's, equal chest movement, nonlabored.  Heart: No gallop, murmur, rub, RRR.  Abdomen: Soft, nontender, positive bowel sounds, no organomegaly.  Extremities: No lower extremity edema, no cyanosis.  Neuro: No focal deficit, moving all extremities, alert oriented X 3   no Yeung catheter.     Labs:  Results from last 7 days   Lab Units 05/11/24  0421 05/10/24  0324 05/09/24  0340   WBC 10*3/mm3 11.07* 10.73 11.78*   HEMOGLOBIN g/dL 9.2* 8.7* 8.4*   PLATELETS 10*3/mm3 216 192 199     Results from last 7 days   Lab Units 05/12/24  0402 05/11/24  0421 05/10/24  0324 05/09/24  0340 05/08/24  0413 05/07/24  0502 05/06/24  0035 05/05/24  2352   SODIUM mmol/L 139 138 133* 132*   < > 131*   < > 141   POTASSIUM mmol/L 4.1 3.7 4.2 4.1   < > 4.5   < > 4.1   CHLORIDE mmol/L 97* 95* 92* 98   < > 99   < > 106   CO2 mmol/L 27.0 " 29.0 24.0 20.0*   < > 17.0*   < > 20.0*   BUN mg/dL 39* 44* 49* 51*   < > 36*   < > 23   CREATININE mg/dL 5.99* 7.08* 7.64* 7.72*   < > 5.11*   < > 2.80*   CALCIUM mg/dL 8.9 8.6 8.4* 8.7   < > 8.2*   < > 8.7   PHOSPHORUS mg/dL 5.1* 5.1* 5.9* 5.0*   < > 3.1  --   --    MAGNESIUM mg/dL  --   --   --  2.6*  --  2.3  --  1.4*   ALBUMIN g/dL 3.4* 3.1* 2.7* 2.8*   < > 2.7*  --  3.2*    < > = values in this interval not displayed.     Results from last 7 days   Lab Units 05/07/24  0502   ALK PHOS U/L 72   BILIRUBIN mg/dL 0.3   ALT (SGPT) U/L 13   AST (SGOT) U/L 18             Estimated Creatinine Clearance: 11.5 mL/min (A) (by C-G formula based on SCr of 5.99 mg/dL (H)).         A/P:  1.  Acute renal failure: Creatinine continues to rise.  Urine output oliguric.  Patient with recurrent ARF with improvement to 2.0 on last discharge..  Current injury likely due to recurrence of ATN with hemodynamic instability.  Patient with new findings of proteinuria.  Hopefully will see signs of recovery soon with improved hemodynamics.  Good urine output with stabilization of creatinine, will monitor closely no dialysis today.     2.  Sepsis: Resolved off pressors.  Off midodrine    3.  Proteinuria patient with 8.8 g proteinuria on ratio.  Previously patient had a microalbumin of 9.  SPEP negative in the past.  Patient with recurrent acute renal failure and perinephric stranding previously thought due to pyelonephritis.  Will monitor    4.  Hyponatremia: Stable.    5.  Metabolic acidosis: Off bicarb drip     6.  Volume: No edema.  Chest x-ray stable.  Good oxygenation.  Strict I's and O's.     7.  Infectious disease: Was on Levaquin as outpatient.  CT scan with possible pyelonephritis but cultures have been negative previously.  ID continues to evaluate.     8.  GI: Upset has been recurrent over the past month with recurrent admissions.  Denies any symptoms at this time.     9.  History of small cell cancer: Post immuno therapy with  Opdivo.  Last infusion 4/4/2024.  Follows with Dr. Ashley.       No indication for dialysis at this time.  Avoid nephrotoxic medication.  High risk complex patient multiple medical problems.  Case discussed with the family.   Patient going home    Alok Dixon MD  05/12/24  11:15 EDT

## 2024-05-13 ENCOUNTER — TRANSITIONAL CARE MANAGEMENT TELEPHONE ENCOUNTER (OUTPATIENT)
Dept: CALL CENTER | Facility: HOSPITAL | Age: 74
End: 2024-05-13
Payer: MEDICARE

## 2024-05-13 NOTE — OUTREACH NOTE
Call Center TCM Note      Flowsheet Row Responses   [facility] patient discharged from? [place]   Does the patient have one of the following disease processes/diagnoses(primary or secondary)? Other   TCM attempt successful? Yes   Call start time 0839   Call end time 0855   Discharge diagnosis Acute pyelonephritis   Person spoke with today (if not patient) and relationship patient   Meds reviewed with patient/caregiver? Yes   Is the patient having any side effects they believe may be caused by any medication additions or changes? No   Does the patient have all medications ordered at discharge? Yes   Is the patient taking all medications as directed (includes completed medication regime)? Yes   Comments Has a hospital followup scheduled on 5/17/2024 with Hayley Castellanos and will need BMP drawn at that time.   Does the patient have an appointment with their PCP within 7-14 days of discharge? Yes   Psychosocial issues? No   Did the patient receive a copy of their discharge instructions? Yes   Nursing interventions Reviewed instructions with patient   What is the patient's perception of their health status since discharge? Improving   Is the patient/caregiver able to teach back signs and symptoms related to disease process for when to call PCP? Yes   Is the patient/caregiver able to teach back signs and symptoms related to disease process for when to call 911? Yes   Is the patient/caregiver able to teach back the hierarchy of who to call/visit for symptoms/problems? PCP, Specialist, Home health nurse, Urgent Care, ED, 911 Yes   If the patient is a current smoker, are they able to teach back resources for cessation? Not a smoker   TCM call completed? Yes   Wrap up additional comments Patient is going for daily antibtiotic infusions at [facility]   Call end time 0855   Would this patient benefit from a Referral to Amb Social Work? No   Is the patient interested in additional calls from an ambulatory ? No             Héctor Conde RN    5/13/2024, 08:55 EDT

## 2024-05-14 ENCOUNTER — READMISSION MANAGEMENT (OUTPATIENT)
Dept: CALL CENTER | Facility: HOSPITAL | Age: 74
End: 2024-05-14
Payer: MEDICARE

## 2024-05-14 ENCOUNTER — HOSPITAL ENCOUNTER (INPATIENT)
Facility: HOSPITAL | Age: 74
LOS: 2 days | Discharge: HOME | DRG: 315 | End: 2024-05-17
Attending: EMERGENCY MEDICINE | Admitting: INTERNAL MEDICINE
Payer: MEDICARE

## 2024-05-14 ENCOUNTER — APPOINTMENT (OUTPATIENT)
Dept: GENERAL RADIOLOGY | Facility: HOSPITAL | Age: 74
DRG: 315 | End: 2024-05-14
Payer: MEDICARE

## 2024-05-14 DIAGNOSIS — A41.9 ACUTE SEPSIS: Primary | ICD-10-CM

## 2024-05-14 DIAGNOSIS — N12 PYELONEPHRITIS: ICD-10-CM

## 2024-05-14 DIAGNOSIS — D72.829 LEUKOCYTOSIS, UNSPECIFIED TYPE: ICD-10-CM

## 2024-05-14 DIAGNOSIS — N18.9 CHRONIC KIDNEY DISEASE, UNSPECIFIED CKD STAGE: ICD-10-CM

## 2024-05-14 PROBLEM — N18.5 STAGE 5 CHRONIC KIDNEY DISEASE NOT ON CHRONIC DIALYSIS: Status: ACTIVE | Noted: 2024-05-14

## 2024-05-14 PROBLEM — N18.4 STAGE 4 CHRONIC KIDNEY DISEASE: Status: ACTIVE | Noted: 2024-05-14

## 2024-05-14 PROBLEM — I95.9 HYPOTENSION: Status: ACTIVE | Noted: 2024-05-14

## 2024-05-14 LAB
ALBUMIN SERPL-MCNC: 3.1 G/DL (ref 3.5–5.2)
ALBUMIN/GLOB SERPL: 0.8 G/DL
ALP SERPL-CCNC: 74 U/L (ref 39–117)
ALT SERPL W P-5'-P-CCNC: 10 U/L (ref 1–41)
ANION GAP SERPL CALCULATED.3IONS-SCNC: 15 MMOL/L (ref 5–15)
AST SERPL-CCNC: 17 U/L (ref 1–40)
B PARAPERT DNA SPEC QL NAA+PROBE: NOT DETECTED
B PERT DNA SPEC QL NAA+PROBE: NOT DETECTED
BACTERIA UR QL AUTO: ABNORMAL /HPF
BASOPHILS # BLD AUTO: 0.07 10*3/MM3 (ref 0–0.2)
BASOPHILS NFR BLD AUTO: 0.3 % (ref 0–1.5)
BILIRUB SERPL-MCNC: 0.4 MG/DL (ref 0–1.2)
BILIRUB UR QL STRIP: NEGATIVE
BUN SERPL-MCNC: 37 MG/DL (ref 8–23)
BUN/CREAT SERPL: 7.1 (ref 7–25)
C PNEUM DNA NPH QL NAA+NON-PROBE: NOT DETECTED
CALCIUM SPEC-SCNC: 8.6 MG/DL (ref 8.6–10.5)
CHLORIDE SERPL-SCNC: 101 MMOL/L (ref 98–107)
CLARITY UR: ABNORMAL
CO2 SERPL-SCNC: 20 MMOL/L (ref 22–29)
COLOR UR: YELLOW
CREAT SERPL-MCNC: 5.23 MG/DL (ref 0.76–1.27)
CRP SERPL-MCNC: 3.78 MG/DL (ref 0–0.5)
D-LACTATE SERPL-SCNC: 1.9 MMOL/L (ref 0.5–2)
DEPRECATED RDW RBC AUTO: 55.5 FL (ref 37–54)
EGFRCR SERPLBLD CKD-EPI 2021: 10.8 ML/MIN/1.73
EOSINOPHIL # BLD AUTO: 0.08 10*3/MM3 (ref 0–0.4)
EOSINOPHIL NFR BLD AUTO: 0.3 % (ref 0.3–6.2)
ERYTHROCYTE [DISTWIDTH] IN BLOOD BY AUTOMATED COUNT: 16.6 % (ref 12.3–15.4)
ERYTHROCYTE [SEDIMENTATION RATE] IN BLOOD: 98 MM/HR (ref 0–20)
FLUAV RNA RESP QL NAA+PROBE: NOT DETECTED
FLUAV SUBTYP SPEC NAA+PROBE: NOT DETECTED
FLUBV RNA ISLT QL NAA+PROBE: NOT DETECTED
FLUBV RNA RESP QL NAA+PROBE: NOT DETECTED
GLOBULIN UR ELPH-MCNC: 3.7 GM/DL
GLUCOSE SERPL-MCNC: 113 MG/DL (ref 65–99)
GLUCOSE UR STRIP-MCNC: ABNORMAL MG/DL
HADV DNA SPEC NAA+PROBE: NOT DETECTED
HCOV 229E RNA SPEC QL NAA+PROBE: NOT DETECTED
HCOV HKU1 RNA SPEC QL NAA+PROBE: NOT DETECTED
HCOV NL63 RNA SPEC QL NAA+PROBE: NOT DETECTED
HCOV OC43 RNA SPEC QL NAA+PROBE: NOT DETECTED
HCT VFR BLD AUTO: 32.3 % (ref 37.5–51)
HGB BLD-MCNC: 10.3 G/DL (ref 13–17.7)
HGB UR QL STRIP.AUTO: ABNORMAL
HMPV RNA NPH QL NAA+NON-PROBE: NOT DETECTED
HOLD SPECIMEN: NORMAL
HPIV1 RNA ISLT QL NAA+PROBE: NOT DETECTED
HPIV2 RNA SPEC QL NAA+PROBE: NOT DETECTED
HPIV3 RNA NPH QL NAA+PROBE: NOT DETECTED
HPIV4 P GENE NPH QL NAA+PROBE: NOT DETECTED
HYALINE CASTS UR QL AUTO: ABNORMAL /LPF
IMM GRANULOCYTES # BLD AUTO: 0.4 10*3/MM3 (ref 0–0.05)
IMM GRANULOCYTES NFR BLD AUTO: 1.7 % (ref 0–0.5)
KETONES UR QL STRIP: NEGATIVE
LEUKOCYTE ESTERASE UR QL STRIP.AUTO: ABNORMAL
LIPASE SERPL-CCNC: 17 U/L (ref 13–60)
LYMPHOCYTES # BLD AUTO: 0.73 10*3/MM3 (ref 0.7–3.1)
LYMPHOCYTES NFR BLD AUTO: 3.1 % (ref 19.6–45.3)
M PNEUMO IGG SER IA-ACNC: NOT DETECTED
MCH RBC QN AUTO: 29.3 PG (ref 26.6–33)
MCHC RBC AUTO-ENTMCNC: 31.9 G/DL (ref 31.5–35.7)
MCV RBC AUTO: 91.8 FL (ref 79–97)
MONOCYTES # BLD AUTO: 1.91 10*3/MM3 (ref 0.1–0.9)
MONOCYTES NFR BLD AUTO: 8.2 % (ref 5–12)
NEUTROPHILS NFR BLD AUTO: 20.24 10*3/MM3 (ref 1.7–7)
NEUTROPHILS NFR BLD AUTO: 86.4 % (ref 42.7–76)
NITRITE UR QL STRIP: NEGATIVE
NRBC BLD AUTO-RTO: 0 /100 WBC (ref 0–0.2)
NT-PROBNP SERPL-MCNC: 857.5 PG/ML (ref 0–1800)
PH UR STRIP.AUTO: 8 [PH] (ref 5–8)
PLATELET # BLD AUTO: 320 10*3/MM3 (ref 140–450)
PMV BLD AUTO: 8.8 FL (ref 6–12)
POTASSIUM SERPL-SCNC: 4.8 MMOL/L (ref 3.5–5.2)
PROCALCITONIN SERPL-MCNC: 1.81 NG/ML (ref 0–0.25)
PROT SERPL-MCNC: 6.8 G/DL (ref 6–8.5)
PROT UR QL STRIP: ABNORMAL
RBC # BLD AUTO: 3.52 10*6/MM3 (ref 4.14–5.8)
RBC # UR STRIP: ABNORMAL /HPF
REF LAB TEST METHOD: ABNORMAL
RHINOVIRUS RNA SPEC NAA+PROBE: NOT DETECTED
RSV RNA NPH QL NAA+NON-PROBE: NOT DETECTED
RSV RNA RESP QL NAA+PROBE: NOT DETECTED
SARS-COV-2 RNA NPH QL NAA+NON-PROBE: NOT DETECTED
SARS-COV-2 RNA RESP QL NAA+PROBE: NOT DETECTED
SODIUM SERPL-SCNC: 136 MMOL/L (ref 136–145)
SP GR UR STRIP: 1.01 (ref 1–1.03)
SQUAMOUS #/AREA URNS HPF: ABNORMAL /HPF
TROPONIN T SERPL HS-MCNC: 34 NG/L
UROBILINOGEN UR QL STRIP: ABNORMAL
WBC # UR STRIP: ABNORMAL /HPF
WBC NRBC COR # BLD AUTO: 23.43 10*3/MM3 (ref 3.4–10.8)
WHOLE BLOOD HOLD COAG: NORMAL
WHOLE BLOOD HOLD SPECIMEN: NORMAL

## 2024-05-14 PROCEDURE — 80053 COMPREHEN METABOLIC PANEL: CPT | Performed by: EMERGENCY MEDICINE

## 2024-05-14 PROCEDURE — 25810000003 SODIUM CHLORIDE 0.9 % SOLUTION: Performed by: INTERNAL MEDICINE

## 2024-05-14 PROCEDURE — 25010000002 ONDANSETRON PER 1 MG: Performed by: EMERGENCY MEDICINE

## 2024-05-14 PROCEDURE — 99285 EMERGENCY DEPT VISIT HI MDM: CPT

## 2024-05-14 PROCEDURE — 84145 PROCALCITONIN (PCT): CPT | Performed by: EMERGENCY MEDICINE

## 2024-05-14 PROCEDURE — 83880 ASSAY OF NATRIURETIC PEPTIDE: CPT | Performed by: EMERGENCY MEDICINE

## 2024-05-14 PROCEDURE — 99291 CRITICAL CARE FIRST HOUR: CPT

## 2024-05-14 PROCEDURE — 25010000002 CEFEPIME PER 500 MG: Performed by: INTERNAL MEDICINE

## 2024-05-14 PROCEDURE — P9612 CATHETERIZE FOR URINE SPEC: HCPCS

## 2024-05-14 PROCEDURE — 84484 ASSAY OF TROPONIN QUANT: CPT | Performed by: EMERGENCY MEDICINE

## 2024-05-14 PROCEDURE — 85025 COMPLETE CBC W/AUTO DIFF WBC: CPT | Performed by: EMERGENCY MEDICINE

## 2024-05-14 PROCEDURE — 99223 1ST HOSP IP/OBS HIGH 75: CPT | Performed by: INTERNAL MEDICINE

## 2024-05-14 PROCEDURE — 87086 URINE CULTURE/COLONY COUNT: CPT | Performed by: EMERGENCY MEDICINE

## 2024-05-14 PROCEDURE — 36415 COLL VENOUS BLD VENIPUNCTURE: CPT

## 2024-05-14 PROCEDURE — G0378 HOSPITAL OBSERVATION PER HR: HCPCS

## 2024-05-14 PROCEDURE — 87040 BLOOD CULTURE FOR BACTERIA: CPT | Performed by: EMERGENCY MEDICINE

## 2024-05-14 PROCEDURE — 0202U NFCT DS 22 TRGT SARS-COV-2: CPT | Performed by: INTERNAL MEDICINE

## 2024-05-14 PROCEDURE — 94799 UNLISTED PULMONARY SVC/PX: CPT

## 2024-05-14 PROCEDURE — 81001 URINALYSIS AUTO W/SCOPE: CPT | Performed by: EMERGENCY MEDICINE

## 2024-05-14 PROCEDURE — 83690 ASSAY OF LIPASE: CPT | Performed by: EMERGENCY MEDICINE

## 2024-05-14 PROCEDURE — 86140 C-REACTIVE PROTEIN: CPT | Performed by: INTERNAL MEDICINE

## 2024-05-14 PROCEDURE — 83605 ASSAY OF LACTIC ACID: CPT | Performed by: EMERGENCY MEDICINE

## 2024-05-14 PROCEDURE — 71045 X-RAY EXAM CHEST 1 VIEW: CPT

## 2024-05-14 PROCEDURE — 87637 SARSCOV2&INF A&B&RSV AMP PRB: CPT | Performed by: EMERGENCY MEDICINE

## 2024-05-14 PROCEDURE — 94640 AIRWAY INHALATION TREATMENT: CPT

## 2024-05-14 PROCEDURE — 85652 RBC SED RATE AUTOMATED: CPT | Performed by: INTERNAL MEDICINE

## 2024-05-14 PROCEDURE — 25810000003 SODIUM CHLORIDE 0.9 % SOLUTION: Performed by: EMERGENCY MEDICINE

## 2024-05-14 PROCEDURE — 93005 ELECTROCARDIOGRAM TRACING: CPT | Performed by: EMERGENCY MEDICINE

## 2024-05-14 RX ORDER — ALBUTEROL SULFATE 2.5 MG/3ML
2.5 SOLUTION RESPIRATORY (INHALATION) EVERY 6 HOURS PRN
Status: DISCONTINUED | OUTPATIENT
Start: 2024-05-14 | End: 2024-05-17 | Stop reason: HOSPADM

## 2024-05-14 RX ORDER — SODIUM CHLORIDE 9 MG/ML
40 INJECTION, SOLUTION INTRAVENOUS AS NEEDED
Status: DISCONTINUED | OUTPATIENT
Start: 2024-05-14 | End: 2024-05-17 | Stop reason: HOSPADM

## 2024-05-14 RX ORDER — ONDANSETRON 4 MG/1
4 TABLET, ORALLY DISINTEGRATING ORAL EVERY 6 HOURS PRN
Status: DISCONTINUED | OUTPATIENT
Start: 2024-05-14 | End: 2024-05-17 | Stop reason: HOSPADM

## 2024-05-14 RX ORDER — ACETAMINOPHEN 650 MG/1
650 SUPPOSITORY RECTAL EVERY 4 HOURS PRN
Status: DISCONTINUED | OUTPATIENT
Start: 2024-05-14 | End: 2024-05-17 | Stop reason: HOSPADM

## 2024-05-14 RX ORDER — SODIUM CHLORIDE 0.9 % (FLUSH) 0.9 %
10 SYRINGE (ML) INJECTION AS NEEDED
Status: DISCONTINUED | OUTPATIENT
Start: 2024-05-14 | End: 2024-05-17 | Stop reason: HOSPADM

## 2024-05-14 RX ORDER — BUDESONIDE AND FORMOTEROL FUMARATE DIHYDRATE 160; 4.5 UG/1; UG/1
2 AEROSOL RESPIRATORY (INHALATION)
Status: DISCONTINUED | OUTPATIENT
Start: 2024-05-14 | End: 2024-05-17 | Stop reason: HOSPADM

## 2024-05-14 RX ORDER — AMOXICILLIN 250 MG
2 CAPSULE ORAL 2 TIMES DAILY PRN
Status: DISCONTINUED | OUTPATIENT
Start: 2024-05-14 | End: 2024-05-17 | Stop reason: HOSPADM

## 2024-05-14 RX ORDER — SODIUM CHLORIDE 0.9 % (FLUSH) 0.9 %
10 SYRINGE (ML) INJECTION EVERY 12 HOURS SCHEDULED
Status: DISCONTINUED | OUTPATIENT
Start: 2024-05-14 | End: 2024-05-17 | Stop reason: HOSPADM

## 2024-05-14 RX ORDER — SODIUM CHLORIDE 9 MG/ML
75 INJECTION, SOLUTION INTRAVENOUS CONTINUOUS
Status: DISCONTINUED | OUTPATIENT
Start: 2024-05-14 | End: 2024-05-16

## 2024-05-14 RX ORDER — ONDANSETRON 2 MG/ML
4 INJECTION INTRAMUSCULAR; INTRAVENOUS EVERY 6 HOURS PRN
Status: DISCONTINUED | OUTPATIENT
Start: 2024-05-14 | End: 2024-05-17 | Stop reason: HOSPADM

## 2024-05-14 RX ORDER — POLYETHYLENE GLYCOL 3350 17 G/17G
17 POWDER, FOR SOLUTION ORAL DAILY PRN
Status: DISCONTINUED | OUTPATIENT
Start: 2024-05-14 | End: 2024-05-17 | Stop reason: HOSPADM

## 2024-05-14 RX ORDER — ONDANSETRON 2 MG/ML
4 INJECTION INTRAMUSCULAR; INTRAVENOUS ONCE
Status: COMPLETED | OUTPATIENT
Start: 2024-05-14 | End: 2024-05-14

## 2024-05-14 RX ORDER — ACETAMINOPHEN 160 MG/5ML
650 SOLUTION ORAL EVERY 4 HOURS PRN
Status: DISCONTINUED | OUTPATIENT
Start: 2024-05-14 | End: 2024-05-17 | Stop reason: HOSPADM

## 2024-05-14 RX ORDER — BISACODYL 5 MG/1
5 TABLET, DELAYED RELEASE ORAL DAILY PRN
Status: DISCONTINUED | OUTPATIENT
Start: 2024-05-14 | End: 2024-05-17 | Stop reason: HOSPADM

## 2024-05-14 RX ORDER — PRAVASTATIN SODIUM 40 MG
80 TABLET ORAL NIGHTLY
Status: DISCONTINUED | OUTPATIENT
Start: 2024-05-14 | End: 2024-05-17 | Stop reason: HOSPADM

## 2024-05-14 RX ORDER — BISACODYL 10 MG
10 SUPPOSITORY, RECTAL RECTAL DAILY PRN
Status: DISCONTINUED | OUTPATIENT
Start: 2024-05-14 | End: 2024-05-17 | Stop reason: HOSPADM

## 2024-05-14 RX ORDER — PANTOPRAZOLE SODIUM 40 MG/1
40 TABLET, DELAYED RELEASE ORAL
Status: DISCONTINUED | OUTPATIENT
Start: 2024-05-14 | End: 2024-05-17 | Stop reason: HOSPADM

## 2024-05-14 RX ORDER — ACETAMINOPHEN 325 MG/1
650 TABLET ORAL EVERY 4 HOURS PRN
Status: DISCONTINUED | OUTPATIENT
Start: 2024-05-14 | End: 2024-05-17 | Stop reason: HOSPADM

## 2024-05-14 RX ADMIN — PRAVASTATIN SODIUM 80 MG: 40 TABLET ORAL at 20:09

## 2024-05-14 RX ADMIN — Medication 10 ML: at 20:09

## 2024-05-14 RX ADMIN — CEFEPIME 2000 MG: 2 INJECTION, POWDER, FOR SOLUTION INTRAVENOUS at 09:52

## 2024-05-14 RX ADMIN — SODIUM CHLORIDE 1000 ML: 9 INJECTION, SOLUTION INTRAVENOUS at 06:47

## 2024-05-14 RX ADMIN — SODIUM CHLORIDE 1000 ML: 9 INJECTION, SOLUTION INTRAVENOUS at 04:50

## 2024-05-14 RX ADMIN — SODIUM CHLORIDE 75 ML/HR: 9 INJECTION, SOLUTION INTRAVENOUS at 10:07

## 2024-05-14 RX ADMIN — BUDESONIDE AND FORMOTEROL FUMARATE DIHYDRATE 2 PUFF: 160; 4.5 AEROSOL RESPIRATORY (INHALATION) at 20:59

## 2024-05-14 RX ADMIN — Medication 10 ML: at 09:53

## 2024-05-14 RX ADMIN — ONDANSETRON 4 MG: 2 INJECTION INTRAMUSCULAR; INTRAVENOUS at 06:48

## 2024-05-14 NOTE — ED NOTES
David Barfield    Nursing Report ED to Floor:  Mental status: A&O x 4  Ambulatory status: Up with one  Oxygen Therapy:  RA  Cardiac Rhythm: NSR  Admitted from: ER  Safety Concerns:  None  Social Issues: None  ED Room #:  6    ED Nurse Phone Extension - [phone] or may call [phone].      HPI:   Chief Complaint   Patient presents with    Hypotension       Past Medical History:  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    Coronary artery disease 2019    Diabetes mellitus Borderline    Emphysema/COPD     Erectile disorder     GERD (gastroesophageal reflux disease)     History of chemotherapy     Hyperlipidemia     Hypertension 2019    Lung nodule     Mumps     Mumps     Pruritus     after bath    Slow to wake up after anesthesia     Stage 5 chronic kidney disease not on chronic dialysis 5/14/2024    Wears dentures     upper only    Wears hearing aid in both ears     usually only wears right        Past Surgical History:  Past Surgical History:   Procedure Laterality Date    BONE BIOPSY      broken bone surgery in his face    BRONCHOSCOPY THORACOTOMY Right 01/09/2024    Procedure: THORACOTOMY FOR LOWER LOBECTOMY AND MEDISTINAL LYMPH NODE DISSECTION RIGHT;  Surgeon: Joey Patel MD;  Location: [facility] OR;  Service: Cardiothoracic;  Laterality: Right;    BRONCHOSCOPY WITH ION ROBOTIC ASSIST N/A 09/15/2023    Procedure: BRONCHOSCOPY NAVIGATION WITH ENDOBRONCHIAL ULTRASOUND AND ION ROBOT;  Surgeon: Octaviano Sampson MD;  Location:  [facility];  Service: Robotics - Pulmonary;  Laterality: N/A;  ion #6 - 0032  - 0015  Cath guide 0061    EBUS balloon removed and intact    CARDIAC ELECTROPHYSIOLOGY PROCEDURE N/A 08/17/2021    Procedure: Pacemaker DC new;  Surgeon: Kayy Box MD;  Location:  [facility];  Service: Cardiology;  Laterality: N/A;    FACIAL FRACTURE SURGERY       LYMPH NODE BIOPSY  2023    PACEMAKER IMPLANTATION          Admitting Doctor:   Raulito Castillo DO    Consulting Provider(s):  Consults       Date and Time Order Name Status Description    5/6/2024 12:10 PM Inpatient Nephrology Consult Completed     5/6/2024  3:25 AM Inpatient Infectious Diseases Consult Completed     4/25/2024  2:19 PM Inpatient Hematology & Oncology Consult Completed     4/25/2024  9:52 AM Inpatient Nephrology Consult Completed     4/24/2024  3:19 PM Inpatient Infectious Diseases Consult Completed     4/13/2024  2:55 AM Inpatient Infectious Diseases Consult Completed              Admitting Diagnosis:   The primary encounter diagnosis was Acute sepsis. Diagnoses of Chronic kidney disease, unspecified CKD stage, Leukocytosis, unspecified type, and Pyelonephritis were also pertinent to this visit.    Most Recent Vitals:   Vitals:    05/14/24 0714 05/14/24 0729 05/14/24 0745 05/14/24 0802   BP: 112/67 125/78 112/62 108/59   BP Location:       Patient Position:       Pulse: 70 70 70 70   Resp:       Temp:       TempSrc:       SpO2: 99% 100% 98% 98%   Weight:       Height:           Active LDAs/IV Access:   Lines, Drains & Airways       Active LDAs       Name Placement date Placement time Site Days    Peripheral IV 05/14/24 0334 Left Antecubital 05/14/24  0334  Antecubital  less than 1    Peripheral IV 05/14/24 0340 Right Antecubital 05/14/24 0340  Antecubital  less than 1                    Labs (abnormal labs have a star):   Labs Reviewed   COMPREHENSIVE METABOLIC PANEL - Abnormal; Notable for the following components:       Result Value    Glucose 113 (*)     BUN 37 (*)     Creatinine 5.23 (*)     CO2 20.0 (*)     Albumin 3.1 (*)     eGFR 10.8 (*)     All other components within normal limits    Narrative:     GFR Normal >60  Chronic Kidney Disease <60  Kidney Failure <15    The GFR formula is only valid for adults with stable renal function between ages 18 and 70.   CBC WITH AUTO DIFFERENTIAL -  Abnormal; Notable for the following components:    WBC 23.43 (*)     RBC 3.52 (*)     Hemoglobin 10.3 (*)     Hematocrit 32.3 (*)     RDW 16.6 (*)     RDW-SD 55.5 (*)     Neutrophil % 86.4 (*)     Lymphocyte % 3.1 (*)     Immature Grans % 1.7 (*)     Neutrophils, Absolute 20.24 (*)     Monocytes, Absolute 1.91 (*)     Immature Grans, Absolute 0.40 (*)     All other components within normal limits   URINALYSIS W/ CULTURE IF INDICATED - Abnormal; Notable for the following components:    Appearance, UA Turbid (*)     Glucose,  mg/dL (Trace) (*)     Blood, UA Small (1+) (*)     Protein, UA >=300 mg/dL (3+) (*)     Leuk Esterase, UA Large (3+) (*)     All other components within normal limits    Narrative:     In absence of clinical symptoms, the presence of pyuria, bacteria, and/or nitrites on the urinalysis result does not correlate with infection.   SINGLE HS TROPONIN T - Abnormal; Notable for the following components:    HS Troponin T 34 (*)     All other components within normal limits    Narrative:     High Sensitive Troponin T Reference Range:  <14.0 ng/L- Negative Female for AMI  <22.0 ng/L- Negative Male for AMI  >=14 - Abnormal Female indicating possible myocardial injury.  >=22 - Abnormal Male indicating possible myocardial injury.   Clinicians would have to utilize clinical acumen, EKG, Troponin, and serial changes to determine if it is an Acute Myocardial Infarction or myocardial injury due to an underlying chronic condition.        PROCALCITONIN - Abnormal; Notable for the following components:    Procalcitonin 1.81 (*)     All other components within normal limits    Narrative:     As a Marker for Sepsis (Non-Neonates):    1. <0.5 ng/mL represents a low risk of severe sepsis and/or septic shock.  2. >2 ng/mL represents a high risk of severe sepsis and/or septic shock.    As a Marker for Lower Respiratory Tract Infections that require antibiotic therapy:    PCT on Admission    Antibiotic Therapy        "6-12 Hrs later    >0.5                Strongly Recommended  >0.25 - <0.5        Recommended   0.1 - 0.25          Discouraged              Remeasure/reassess PCT  <0.1                Strongly Discouraged     Remeasure/reassess PCT    As 28 day mortality risk marker: \"Change in Procalcitonin Result\" (>80% or <=80%) if Day 0 (or Day 1) and Day 4 values are available. Refer to http://www.[facility]ristowExilespct-calculator.com    Change in PCT <=80%  A decrease of PCT levels below or equal to 80% defines a positive change in PCT test result representing a higher risk for 28-day all-cause mortality of patients diagnosed with severe sepsis for septic shock.    Change in PCT >80%  A decrease of PCT levels of more than 80% defines a negative change in PCT result representing a lower risk for 28-day all-cause mortality of patients diagnosed with severe sepsis or septic shock.      URINALYSIS, MICROSCOPIC ONLY - Abnormal; Notable for the following components:    RBC, UA 3-5 (*)     WBC, UA Too Numerous to Count (*)     Bacteria, UA 1+ (*)     All other components within normal limits   C-REACTIVE PROTEIN - Abnormal; Notable for the following components:    C-Reactive Protein 3.78 (*)     All other components within normal limits   SEDIMENTATION RATE - Abnormal; Notable for the following components:    Sed Rate 98 (*)     All other components within normal limits   COVID-19/FLUA&B/RSV, NP SWAB IN TRANSPORT MEDIA 1 HR TAT - Normal    Narrative:     Fact sheet for providers: https://www.fda.gov/media/301008/download    Fact sheet for patients: https://www.fda.gov/media/499637/download    Test performed by PCR.   LIPASE - Normal   BNP (IN-HOUSE) - Normal    Narrative:     This assay is used as an aid in the diagnosis of individuals suspected of having heart failure. It can be used as an aid in the diagnosis of acute decompensated heart failure (ADHF) in patients presenting with signs and symptoms of ADHF to the emergency department (ED). In " addition, NT-proBNP of <300 pg/mL indicates ADHF is not likely.    Age Range Result Interpretation  NT-proBNP Concentration (pg/mL:      <50             Positive            >450                   Gray                 300-450                    Negative             <300    50-75           Positive            >900                  Gray                300-900                  Negative            <300      >75             Positive            >1800                  Gray                300-1800                  Negative            <300   LACTIC ACID, PLASMA - Normal   BLOOD CULTURE   BLOOD CULTURE   URINE CULTURE   RESPIRATORY PANEL PCR W/ COVID-19 (SARS-COV-2), NP SWAB IN UTM/VTP, 2 HR TAT   RAINBOW DRAW    Narrative:     The following orders were created for panel order [place] Draw.  Procedure                               Abnormality         Status                     ---------                               -----------         ------                     Green Top (Gel)[[id number]]                                  Final result               Lavender Top[[id number]]                                     Final result               Gold Top - SST[[id number]]                                   Final result               Roblero Top[[id number]]                                         Final result               Light Blue Top[[id number]]                                   Final result                 Please view results for these tests on the individual orders.   CBC AND DIFFERENTIAL    Narrative:     The following orders were created for panel order CBC & Differential.  Procedure                               Abnormality         Status                     ---------                               -----------         ------                     CBC Auto Differential[[id number]]        Abnormal            Final result                 Please view results for these tests on the individual orders.   GREEN TOP   LAVENDER TOP   GOLD TOP - SST    GRAY TOP   LIGHT BLUE TOP       Meds Given in ED:   Medications   sodium chloride 0.9 % flush 10 mL (has no administration in time range)   sodium chloride 0.9 % infusion (has no administration in time range)   cefepime 2000 mg IVPB in 100 mL NS (MBP) (has no administration in time range)   sodium chloride 0.9 % bolus 1,000 mL (0 mL Intravenous Stopped 5/14/24 0607)   ondansetron (ZOFRAN) injection 4 mg (4 mg Intravenous Given 5/14/24 0648)     sodium chloride, 75 mL/hr         Last NIH score:                                                          Dysphagia screening results:        Luis E Coma Scale:  No data recorded     CIWA:        Restraint Type:            Isolation Status:  No active isolations

## 2024-05-14 NOTE — Clinical Note
Level of Care: Telemetry [5]   Diagnosis: Hypotension [804379]   Admitting Physician: RIDDHI KEITH [[id number]]   Attending Physician: RIDDHI KEITH [[id number]]   Bed Request Comments: Spouse requesting [place] tow due to limited mobility

## 2024-05-14 NOTE — ED PROVIDER NOTES
Subjective   History of Present Illness  This is a 74-year-old male with past medical history of hypertension and diabetes presented to the emergency department with some nausea, vomiting and low blood pressure.  The patient has apparently had this numerous times before in the past.  States that he can feel his blood pressure dropping.  Gets very weak and fatigued.  Patient states that he has had some nausea and vomiting over the last few days.  Other than that, he is not having any symptoms.  Denies any fevers or chills.  No headache or change in vision.  No focal weakness.  No chest pain or shortness of breath.  No diarrhea.    History provided by:  Patient and EMS personnel   used: No        Review of Systems   Constitutional:  Positive for fatigue. Negative for chills and fever.   HENT:  Negative for congestion, ear pain and sore throat.    Eyes:  Negative for visual disturbance.   Respiratory:  Negative for shortness of breath.    Cardiovascular:  Negative for chest pain.   Gastrointestinal:  Positive for nausea and vomiting. Negative for abdominal pain.   Genitourinary:  Negative for difficulty urinating.   Musculoskeletal:  Negative for arthralgias.   Skin:  Negative for rash.   Neurological:  Negative for dizziness, weakness and numbness.   Psychiatric/Behavioral:  Negative for agitation.        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    Coronary artery disease 2019    Diabetes mellitus Borderline    Emphysema/COPD     Erectile disorder     GERD (gastroesophageal reflux disease)     History of chemotherapy     Hyperlipidemia     Hypertension 2019    Lung nodule     Mumps     Mumps     Pruritus     after bath    Slow to wake up after anesthesia     Wears dentures     upper only    Wears hearing aid in both ears     usually only wears right       Allergies   Allergen  "Reactions    Cymbalta [Duloxetine Hcl] GI Intolerance    Gabapentin Mental Status Change     Pt states that this medication \"makes him feel foolish in his head\".     Remeron [Mirtazapine] Other (See Comments)     Excess sedation    Toradol [Ketorolac Tromethamine] GI Intolerance     Projectile vomiting     Latex Other (See Comments)     Latex allergy     Tape Rash       Past Surgical History:   Procedure Laterality Date    BONE BIOPSY      broken bone surgery in his face    BRONCHOSCOPY THORACOTOMY Right 01/09/2024    Procedure: THORACOTOMY FOR LOWER LOBECTOMY AND MEDISTINAL LYMPH NODE DISSECTION RIGHT;  Surgeon: Joey Patel MD;  Location:  [facility];  Service: Cardiothoracic;  Laterality: Right;    BRONCHOSCOPY WITH ION ROBOTIC ASSIST N/A 09/15/2023    Procedure: BRONCHOSCOPY NAVIGATION WITH ENDOBRONCHIAL ULTRASOUND AND ION ROBOT;  Surgeon: Octaviano Sampson MD;  Location:  [facility];  Service: Robotics - Pulmonary;  Laterality: N/A;  ion #6 - 0032  - 0015  Cath guide 0061    EBUS balloon removed and intact    CARDIAC ELECTROPHYSIOLOGY PROCEDURE N/A 08/17/2021    Procedure: Pacemaker DC new;  Surgeon: Kayy Box MD;  Location:  [facility]ATH INVASIVE LOCATION;  Service: Cardiology;  Laterality: N/A;    FACIAL FRACTURE SURGERY      LYMPH NODE BIOPSY  2023    PACEMAKER IMPLANTATION         Family History   Problem Relation Age of Onset    Aneurysm Mother         brain    Dementia Father     Leukemia Sister     Heart disease Paternal Grandmother     Hypertension Paternal Grandfather     Cancer Sister        Social History     Socioeconomic History    Marital status: Single    Number of children: 3   Tobacco Use    Smoking status: Every Day     Current packs/day: 0.50     Average packs/day: 0.5 packs/day for 56.4 years (28.7 ttl pk-yrs)     Types: Cigarettes     Start date: 1/1/1968    Smokeless tobacco: Never    Tobacco comments:     Still smoke   Vaping Use    Vaping status: Never Used "   Substance and Sexual Activity    Alcohol use: Never    Drug use: Never    Sexual activity: Yes     Partners: Female     Birth control/protection: None           Objective   Physical Exam  Vitals and nursing note reviewed.   Constitutional:       General: He is not in acute distress.     Appearance: He is not ill-appearing or toxic-appearing.   HENT:      Mouth/Throat:      Pharynx: No posterior oropharyngeal erythema.   Eyes:      Extraocular Movements: Extraocular movements intact.      Pupils: Pupils are equal, round, and reactive to light.   Cardiovascular:      Rate and Rhythm: Normal rate and regular rhythm.   Pulmonary:      Effort: Pulmonary effort is normal. No respiratory distress.   Abdominal:      General: Abdomen is flat. There is no distension.      Palpations: There is no mass.      Tenderness: There is no abdominal tenderness. There is no guarding or rebound.   Musculoskeletal:         General: No deformity. Normal range of motion.   Neurological:      General: No focal deficit present.      Mental Status: He is alert.      Sensory: No sensory deficit.      Motor: No weakness.         ECG 12 Lead      Date/Time: 5/14/2024 4:05 AM    Performed by: Octaviano Garcia MD  Authorized by: Octaviano Garcia MD  Interpreted by ED physician  Comparison: compared with previous ECG   Similar to previous ECG  Rhythm: sinus rhythm  Rate: normal  BPM: 75  QRS axis: normal  Conduction: conduction normal  ST Segments: ST segments normal  Other: no other findings  Clinical impression: normal ECG  Comments: Interpretation:  EKG was directly visualized by myself, interpretations as documented in hospital course.               ED Course  ED Course as of 05/14/24 0621   Tue May 14, 2024   0408 BP: 99/87 [JK]   0408 Temp: 98.5 °F (36.9 °C) [JK]   0408 Temp src: Oral [JK]   0408 Heart Rate: 84 [JK]   0408 Resp: 16 [JK]   0408 SpO2: 99 % [JK]   0408 Device (Oxygen Therapy): room air  Interpretation:  Patient's repeat  vitals, telemetry tracing, and pulse oximetry tracing were directly viewed and interpreted by myself.  Normal sinus rhythm [JK]   0618 CBC & Differential(!) [JK]   0618 Comprehensive Metabolic Panel(!) [JK]   0618 Single High Sensitivity Troponin T(!) [JK]   0618 BNP [JK]   0618 COVID-19, FLU A/B, RSV PCR 1 HR TAT - Swab, Nasopharynx [JK]   0618 Procalcitonin(!) [JK]   0618 Lipase [JK]   0618 Lactic Acid, Plasma  Interpretation:  Laboratory studies were reviewed and interpreted directly by myself.  CMP shows a chronic kidney disease with a BUN of 37 creatinine 5.23, troponin was marginal at 34, BNP normal, respiratory panel normal, CBC showed marked leukocytosis with a white blood cell count of 23.43, chronic anemia with hemoglobin 10.3, procalcitonin was elevated at 1.81, lipase normal, lactic normal [JK]   0619 XR Chest 1 View [JK]   0619 Interpretation:  Imaging was directly visualized by myself, per my interpretations, chest x-ray showed some stable right lower lobe effusion.. [JK]   0619 Patient does have marked leukocytosis as well as stable chronic kidney disease.  I do believe is likely related to his current pyelonephritis.  Patient's blood pressure did respond well to fluids.  He is not requiring pressor support at this time.  Given his significant medical condition, I do feel he requires admission to the hospital for further evaluation treatment.  Patient is agreeable.  I did restart the patient on broad-spectrum antibiotics per infectious diseases. [JK]   0620 Based on the patient's presentation, history and diffuse work-up in the emergency department, the patient is deemed appropriate for admission to the hospital for further evaluation and treatment.  This was discussed with the patient at bedside.  They are in agreement with the current medical management.    Admitting physician: Dr. Castillo    Discussion was had with admitting physician regarding the laboratory and imaging findings.  We did discuss  current therapeutics in the emergency department and progression of the patient.  Working diagnosis was conveyed to the admitting physician, as well as current status and prognosis for the patient.  They are in agreement with these findings and have accepted admission.    Shared decision making:   After full review of the patient's clinical presentation, review of any work-up including but not limited to laboratory studies and radiology obtained, I had a discussion with the patient.  Treatment options were discussed as well as the risks, benefits and consequences.  I discussed all findings with the patient and family members if available.  During the discussion, treatment goals were understood by all as well as any misconceptions which were addressed with the patient.  Ample time was given for any questions they may have had.  They are in agreement with the treatment plan as well as final disposition. [JK]      ED Course User Index  [JK] Octaviano Garcia MD                                             Medical Decision Making  This is a 74-year-old male with a history of hypertension diabetes presented to the emergency department some generalized fatigue, nausea and vomiting.  Patient is apparently had episodes like this before in the past.  Patient is been admitted secondary to some low blood pressure.  On exam, the patient appears appropriate.  He does have some mildly low blood pressure.,  However otherwise his heart rate appears stable.  He is afebrile.  Nontoxic-appearing.  IV access was established and the patient.  He was provided fluid support.  Placed on continuous telemetry monitoring.  Workup initiated.      Differential diagnosis: Hypertension, near syncope, acute kidney injury, electrolyte abnormality, UTI, pneumonia, CAD, CHF      Amount and/or Complexity of Data Reviewed  Independent Historian: EMS  External Data Reviewed: labs, radiology, ECG and notes.     Details: External laboratories, imaging  as well as notes were reviewed personally by myself.  All relevant studies were used to guide decision making.     Date of previous record: 5/5/2024    Source of note: Admission record    Summary: Patient was admitted for low blood pressure and similar symptoms.  I did review basic laboratory studies on file as well as previous chest x-ray and EKG.  Records reviewed    Labs: ordered. Decision-making details documented in ED Course.  Radiology: ordered and independent interpretation performed. Decision-making details documented in ED Course.  ECG/medicine tests: ordered and independent interpretation performed. Decision-making details documented in ED Course.    Risk  Prescription drug management.    Critical Care  Total time providing critical care: 32 minutes (Authorized and performed by: Octaviano Garcia MD  I personally spent a total of 32 minutes of critical care time with the patient.  Due to the high probability of clinically significant, life-threatening deterioration, the patient required my highest level of care to intervene emergently.  These interventions, including, but not limited to, establishing IV access, continuous pulse oximeter and telemetry monitoring, frequent monitoring and reevaluations, management the patient's airway and cardiovascular system, discussion with other consultants as needed, which bear directly on the management the patient.  This also includes obtaining history, examining the patient, frequent reevaluations and coordinating high level of care.  Failure to emergently initiate these interventions would carry a high probability of resulting in sudden, clinically significant or life threatening deterioration in the patient's condition.  This does not include time spent on separately reported billable procedures.)        Final diagnoses:   Acute sepsis   Chronic kidney disease, unspecified CKD stage   Leukocytosis, unspecified type   Pyelonephritis       ED Disposition  ED  Disposition       ED Disposition   Decision to Admit    Condition   --    Comment   Level of Care: Telemetry [5]   Diagnosis: Pyelonephritis [312670]   Admitting Physician: KERRI OLIVARES III [[id number]]   Attending Physician: KERRI OLIVARES III [[id number]]   Bed Request Comments: tele obs (not CDU)                 No follow-up provider specified.       Medication List      No changes were made to your prescriptions during this visit.            Octaviano Garcia MD  05/14/24 0621

## 2024-05-14 NOTE — H&P
[facility] Medicine Services  HISTORY AND PHYSICAL    Patient Name: David Barfield  : 1950  MRN: [id number]  Primary Care Physician: Hayley Castellanso APRN  Date of admission: 2024      Subjective   Subjective     Chief Complaint:  Nausea, low blood pressure, weakness    HPI:  David Barfield is a 74 y.o. male w/ NSCLC s/p neoadjuvant chemo, RLL lobectomy 2024, subsequent immunotherapy w/ Opdivo (last dose ~24), CAD, HTN, AV block s/p ppm, hypotension, emphysema, tobacco use, CKD4-5, with 3 admissions since last month. Recent admissions have been for sepsis of unknown source, s/p multiple courses of abx, all culture data has been negative including Karius test by ID. Imaging has been positive for nonspecific BL perinephric stranding only w/ negative urine cultures. He was planned for IV cefepime through 6/3/24. He was discharged 24, per DC summary he was recommended monitoring an additional day but was adamant about discharge.     Yesterday he received his IV cefepime in the clinic. He ate breakfast/lunch but no supper. Last evening he developed severe nausea w/ dry heaving. This AM his BP was in the 80's at home and EMS was called. In the ED he was hypotensive which has responded to IVF thus far. He denies fever, chills, sore throat, SOB, productive cough, abd pain, diarrhea, dysuria. He feels much better after fluids.      Personal History     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    Coronary artery disease 2019    Diabetes mellitus Borderline    Emphysema/COPD     Erectile disorder     GERD (gastroesophageal reflux disease)     History of chemotherapy     Hyperlipidemia     Hypertension 2019    Lung nodule     Mumps     Mumps     Pruritus     after bath    Slow to wake up after anesthesia     Stage 5 chronic kidney disease not on chronic  dialysis 5/14/2024    Wears dentures     upper only    Wears hearing aid in both ears     usually only wears right         Oncology Problem List:  Lung cancer (01/09/2024; Status: Resolved)  Bronchogenic cancer of right lung (10/04/2023; Status: Active)  Malignant neoplasm of lower lobe of right lung (10/04/2023; Status:   Active)  Oncology/Hematology History   Malignant neoplasm of lower lobe of right lung   10/4/2023 Initial Diagnosis    Malignant neoplasm of lower lobe of right lung     10/4/2023 Cancer Staged    Staging form: Lung, AJCC 8th Edition  - Clinical: Stage IIIA (cT2b, cN2, cM0) - Signed by Neetu Ashley MD on 10/4/2023     10/23/2023 - 12/5/2023 Chemotherapy    OP LUNG  Nivolumab 360mg /  PACLitaxel / CARBOplatin AUC=5      10/23/2023 -  Chemotherapy    OP CENTRAL VENOUS ACCESS DEVICE Access, Care, and Maintenance (CVAD)     2/6/2024 - 2/27/2024 Chemotherapy    OP LUNG Atezolizumab       Past Surgical History:   Procedure Laterality Date    BONE BIOPSY      broken bone surgery in his face    BRONCHOSCOPY THORACOTOMY Right 01/09/2024    Procedure: THORACOTOMY FOR LOWER LOBECTOMY AND MEDISTINAL LYMPH NODE DISSECTION RIGHT;  Surgeon: Joey Patel MD;  Location: [facility] OR;  Service: Cardiothoracic;  Laterality: Right;    BRONCHOSCOPY WITH ION ROBOTIC ASSIST N/A 09/15/2023    Procedure: BRONCHOSCOPY NAVIGATION WITH ENDOBRONCHIAL ULTRASOUND AND ION ROBOT;  Surgeon: Octaviano Sampson MD;  Location:  [facility];  Service: Robotics - Pulmonary;  Laterality: N/A;  ion #6 - 0032  - 0015  Cath guide 0061    EBUS balloon removed and intact    CARDIAC ELECTROPHYSIOLOGY PROCEDURE N/A 08/17/2021    Procedure: Pacemaker DC new;  Surgeon: Kayy Box MD;  Location:  [facility];  Service: Cardiology;  Laterality: N/A;    FACIAL FRACTURE SURGERY      LYMPH NODE BIOPSY  2023    PACEMAKER IMPLANTATION         Family History: family history includes Aneurysm in his mother;  "Cancer in his sister; Dementia in his father; Heart disease in his paternal grandmother; Hypertension in his paternal grandfather; Leukemia in his sister.     Social History:  reports that he has been smoking cigarettes. He started smoking about 56 years ago. He has a 28.7 pack-year smoking history. He has never used smokeless tobacco. He reports that he does not drink alcohol and does not use drugs.  Social History     Social History Narrative    Lives in [place], [place]       Medications:  Available home medication information reviewed.  Fluticasone-Umeclidin-Vilant, HYDROcodone-acetaminophen, albuterol sulfate HFA, amLODIPine, cefepime 2000 mg IVPB in 100 mL NS (MBP), ferrous sulfate, fluticasone, lidocaine-prilocaine, omeprazole, ondansetron, pravastatin, sildenafil, and vitamin b complex    Allergies   Allergen Reactions    Cymbalta [Duloxetine Hcl] GI Intolerance    Gabapentin Mental Status Change     Pt states that this medication \"makes him feel foolish in his head\".     Remeron [Mirtazapine] Other (See Comments)     Excess sedation    Toradol [Ketorolac Tromethamine] GI Intolerance     Projectile vomiting     Latex Other (See Comments)     Latex allergy     Tape Rash       Objective   Objective     Vital Signs:   Temp:  [98.5 °F (36.9 °C)-99.2 °F (37.3 °C)] 99.2 °F (37.3 °C)  Heart Rate:  [70-94] 70  Resp:  [16] 16  BP: ()/(52-87) 125/78       Physical Exam   Constitutional: Awake, alert, chronically ill appearing male laying on ED stretcher, nontoxic appearing  HENT: NCAT, mucous membranes moist  Respiratory: Clear to auscultation bilaterally, respiratory effort normal   Cardiovascular: RRR, palpable radial pulses  Gastrointestinal: Positive bowel sounds, soft, nontender, nondistended  Musculoskeletal: No LE edema  Psychiatric: Appropriate affect, cooperative  Neurologic: Speech clear and fluent, moving extremities spontaneously    Result Review:  I have personally reviewed the results from the time of " this admission to 5/14/2024 07:53 EDT and agree with these findings:  []  Laboratory list / accordion  []  Microbiology  []  Radiology  []  EKG/Telemetry   []  Cardiology/Vascular   []  Pathology  [x]  Old records  []  Other:  Most notable findings include: extensive prior hospitalizations reveiwed      LAB RESULTS:      Lab 05/14/24  0431 05/14/24  0339 05/11/24  0421 05/10/24  0324 05/09/24  0340 05/08/24  0413   WBC  --  23.43* 11.07* 10.73 11.78* 16.54*   HEMOGLOBIN  --  10.3* 9.2* 8.7* 8.4* 9.1*   HEMATOCRIT  --  32.3* 27.8* 25.9* 25.5* 27.9*   PLATELETS  --  320 216 192 199 202   NEUTROS ABS  --  20.24* 7.64* 7.90* 9.31* 13.85*   IMMATURE GRANS (ABS)  --  0.40* 0.09* 0.04 0.07* 0.09*   LYMPHS ABS  --  0.73 1.41 1.12 0.87 0.80   MONOS ABS  --  1.91* 1.70* 1.45* 1.28* 1.47*   EOS ABS  --  0.08 0.17 0.17 0.19 0.25   MCV  --  91.8 88.3 87.2 88.9 92.7   PROCALCITONIN 1.81*  --   --   --   --   --    LACTATE  --  1.9  --   --   --   --    PROTIME  --   --   --   --   --  18.0*   INR  --   --   --   --   --  1.47*         Lab 05/14/24  0431 05/12/24  0402 05/11/24  0421 05/10/24  0324 05/09/24  0340 05/08/24  0413   SODIUM 136 139 138 133* 132* 128*   POTASSIUM 4.8 4.1 3.7 4.2 4.1 4.5   CHLORIDE 101 97* 95* 92* 98 97*   CO2 20.0* 27.0 29.0 24.0 20.0* 16.0*   ANION GAP 15.0 15.0 14.0 17.0* 14.0 15.0   BUN 37* 39* 44* 49* 51* 47*   CREATININE 5.23* 5.99* 7.08* 7.64* 7.72* 6.37*   EGFR 10.8* 9.2* 7.5* 6.8* 6.8* 8.5*   GLUCOSE 113* 99 116* 108* 97 90   CALCIUM 8.6 8.9 8.6 8.4* 8.7 8.4*   MAGNESIUM  --   --   --   --  2.6*  --    PHOSPHORUS  --  5.1* 5.1* 5.9* 5.0* 4.4         Lab 05/14/24  0431 05/12/24  0402 05/11/24  0421 05/10/24  0324 05/09/24  0340   TOTAL PROTEIN 6.8  --   --   --   --    ALBUMIN 3.1* 3.4* 3.1* 2.7* 2.8*   GLOBULIN 3.7  --   --   --   --    ALT (SGPT) 10  --   --   --   --    AST (SGOT) 17  --   --   --   --    BILIRUBIN 0.4  --   --   --   --    ALK PHOS 74  --   --   --   --    LIPASE 17  --   --    --   --          Lab 05/14/24  0431   PROBNP 857.5   HSTROP T 34*                 UA          5/3/2024    16:32 5/6/2024    12:06 5/14/2024    06:16   Urinalysis   Squamous Epithelial Cells, UA 0-2  7-12  None Seen    Specific [place], UA 1.010  1.012  1.012    Ketones, UA Negative  Negative  Negative    Blood, UA Negative  Trace  Small (1+)    Leukocytes, UA Negative  Small (1+)  Large (3+)    Nitrite, UA Negative  Negative  Negative    RBC, UA 0-2  3-5  3-5    WBC, UA 0-2  21-50  Too Numerous to Count    Bacteria, UA None Seen  None Seen  1+        Microbiology Results (last 10 days)       Procedure Component Value - Date/Time    COVID-19, FLU A/B, RSV PCR 1 HR TAT - Swab, Nasopharynx [[id number]]  (Normal) Collected: 05/14/24 0416    Lab Status: Final result Specimen: Swab from Nasopharynx Updated: 05/14/24 0513     COVID19 Not Detected     Influenza A PCR Not Detected     Influenza B PCR Not Detected     RSV, PCR Not Detected    Narrative:      Fact sheet for providers: https://www.fda.gov/media/375377/download    Fact sheet for patients: https://www.fda.gov/media/803422/download    Test performed by PCR.    Gastrointestinal Panel, PCR - Stool, Per Rectum [[id number]]  (Normal) Collected: 05/07/24 0509    Lab Status: Final result Specimen: Stool from Per Rectum Updated: 05/07/24 0751     Campylobacter Not Detected     Plesiomonas shigelloides Not Detected     Salmonella Not Detected     Vibrio Not Detected     Vibrio cholerae Not Detected     Yersinia enterocolitica Not Detected     Enteroaggregative E. coli (EAEC) Not Detected     Enteropathogenic E. coli (EPEC) Not Detected     Enterotoxigenic E. coli (ETEC) lt/st Not Detected     Shiga-like toxin-producing E. coli (STEC) stx1/stx2 Not Detected     Shigella/Enteroinvasive E. coli (EIEC) Not Detected     Cryptosporidium Not Detected     Cyclospora cayetanensis Not Detected     Entamoeba histolytica Not Detected     Giardia lamblia Not Detected     Adenovirus  F40/41 Not Detected     Astrovirus Not Detected     Norovirus GI/GII Not Detected     Rotavirus A Not Detected     Sapovirus (I, II, IV or V) Not Detected    Blood Culture - Blood, Wrist, Right [[id number]]  (Normal) Collected: 05/06/24 1228    Lab Status: Final result Specimen: Blood from Wrist, Right Updated: 05/11/24 0100     Blood Culture No growth at 5 days    Urine Culture - Urine, Urine, Clean Catch [[id number]]  (Normal) Collected: 05/06/24 1206    Lab Status: Final result Specimen: Urine, Clean Catch Updated: 05/07/24 0956     Urine Culture No growth    Eosinophil Smear - Urine, Urine, Clean Catch [[id number]]  (Normal) Collected: 05/06/24 1206    Lab Status: Final result Specimen: Urine, Clean Catch Updated: 05/06/24 1716     Eosinophil Smear 0 % EOS/100 Cells     Narrative:      No eosinophil seen    MRSA Screen, PCR (Inpatient) - Swab, Nares [[id number]]  (Normal) Collected: 05/06/24 0656    Lab Status: Final result Specimen: Swab from Nares Updated: 05/06/24 0831     MRSA PCR Negative    Narrative:      The negative predictive value of this diagnostic test is high and should only be used to consider de-escalating anti-MRSA therapy. A positive result may indicate colonization with MRSA and must be correlated clinically.  MRSA Negative    Legionella Antigen, Urine - Urine, Urine, Clean Catch [[id number]]  (Normal) Collected: 05/06/24 0507    Lab Status: Final result Specimen: Urine, Clean Catch Updated: 05/06/24 0940     LEGIONELLA ANTIGEN, URINE Negative    S. Pneumo Ag Urine or CSF - Urine, Urine, Clean Catch [[id number]]  (Normal) Collected: 05/06/24 0507    Lab Status: Final result Specimen: Urine, Clean Catch Updated: 05/06/24 0940     Strep Pneumo Ag Negative    Blood Culture - Blood, Arm, Right [[id number]]  (Normal) Collected: 05/06/24 0054    Lab Status: Final result Specimen: Blood from Arm, Right Updated: 05/11/24 0115     Blood Culture No growth at 5 days    COVID PRE-OP / PRE-PROCEDURE SCREENING  ORDER (NO ISOLATION) - Swab, Nasopharynx [[id number]]  (Normal) Collected: 05/05/24 2353    Lab Status: Final result Specimen: Swab from Nasopharynx Updated: 05/06/24 0102    Narrative:      The following orders were created for panel order COVID PRE-OP / PRE-PROCEDURE SCREENING ORDER (NO ISOLATION) - Swab, Nasopharynx.  Procedure                               Abnormality         Status                     ---------                               -----------         ------                     Respiratory Panel PCR w/...[[id number]]  Normal              Final result                 Please view results for these tests on the individual orders.    Respiratory Panel PCR w/COVID-19(SARS-CoV-2) [facility]-House, NP Swab in UTM/VTM, 2 HR TAT - Swab, Nasopharynx [[id number]]  (Normal) Collected: 05/05/24 2353    Lab Status: Final result Specimen: Swab from Nasopharynx Updated: 05/06/24 0102     ADENOVIRUS, PCR Not Detected     Coronavirus 229E Not Detected     Coronavirus HKU1 Not Detected     Coronavirus NL63 Not Detected     Coronavirus OC43 Not Detected     COVID19 Not Detected     Human Metapneumovirus Not Detected     Human Rhinovirus/Enterovirus Not Detected     Influenza A PCR Not Detected     Influenza B PCR Not Detected     Parainfluenza Virus 1 Not Detected     Parainfluenza Virus 2 Not Detected     Parainfluenza Virus 3 Not Detected     Parainfluenza Virus 4 Not Detected     RSV, PCR Not Detected     Bordetella pertussis pcr Not Detected     Bordetella parapertussis PCR Not Detected     Chlamydophila pneumoniae PCR Not Detected     Mycoplasma pneumo by PCR Not Detected    Narrative:      In the setting of a positive respiratory panel with a viral infection PLUS a negative procalcitonin without other underlying concern for bacterial infection, consider observing off antibiotics or discontinuation of antibiotics and continue supportive care. If the respiratory panel is positive for atypical bacterial  infection (Bordetella pertussis, Chlamydophila pneumoniae, or Mycoplasma pneumoniae), consider antibiotic de-escalation to target atypical bacterial infection.            XR Chest 1 View    Result Date: 5/14/2024  Examination: XR CHEST 1 VW-  Date of Exam: 5/14/2024 3:48 AM  Indication: cough.  Comparison: 5/11/2024.  Technique: 1 view of the chest  Findings: No significant consolidation. Small right-sided pleural effusion present, stable to improved as compared to the previous study. Stable right subclavian AICD/pacemaker device. Stable left-sided Mediport. No pneumothorax. The heart size is normal. The pulmonary vasculature appears within normal limits. No acute osseous abnormality identified.      Impression: Small right-sided pleural effusion, stable to improved as compared to the previous study. No significant airspace disease.  This report was finalized on 5/14/2024 4:10 AM by Desiree Matthews MD.       Results for orders placed during the hospital encounter of 05/05/24    Adult Transesophageal Echo 3D (DAMIÁN) W/ Cont If Necessary Per Protocol    Interpretation Summary    Left ventricular systolic function is normal. Left ventricular ejection fraction appears to be 61 - 65%. Normal left ventricular cavity size noted. Left ventricular wall thickness is consistent with mild concentric hypertrophy. All left ventricular wall segments contract normally.    There is mild calcification of the aortic valve. The aortic valve appears trileaflet. Mild aortic valve regurgitation is present. No aortic valve stenosis is present. There is no evidence of an aortic valve mass is present.    There is mild calcification of the mitral valve. There is mild, bileaflet mitral valve thickening present. No evidence of a mitral valve mass is present. Mild to moderate mitral valve regurgitation is present. No significant mitral valve stenosis is present.    The tricuspid valve is structurally normal with no significant stenosis present. There  is no evidence of a mass on the tricuspid valve. Trace tricuspid valve regurgitation is present.    The pulmonic valve is grossly normal in structure. There is trace pulmonic valve regurgitation present.    No vegetation seen on atrial aspect of pacemaker leads.  The most distal aspects of the RV lead were not completely visualized however there was no apparent vegetation in the more proximal segment, adjacent to the tricuspid valve which appears to be functioning normally.      Assessment & Plan   Assessment & Plan       Hypotension    Presence of cardiac pacemaker    Centrilobular emphysema    Tobacco abuse    Bronchogenic cancer of right lung    Stage 4 chronic kidney disease    Summary: This is a 73 y/o male w/ NSCLC s/p neoadjuvant chemo, RLL lobectomy 1/2024, immunotherapy with Opdivo (last dose 4/4/2024), CAD, HTN, AV block s/p PPM, hypotension, emphysema, tobacco use, CKD 4-5, he admitted 3 times in the last 1-2 months.  He was initially admitted with clinical concern for sepsis, CT imaging concerning for bilateral perinephric stranding (nonspecific), treated with antibiotics for concern for pyelonephritis.  All cultures negative, discharged home with further antibiotics.  He has been readmitted twice with concerns for sepsis, questionable ongoing pyelonephritis, typically presenting with hypotension and leukocytosis.  He most recently was discharged 4/12/2024 with plans for IV cefepime via his port through 6/3/2024, he was home less than 48 hours when he developed nausea, dry heaving, hypotension, which has improved with IVF in the ED    Assessment/Plan    Nausea and vomiting  Hypotension, fluid responsive  -recent w/u included (but not limited to), TTE (normal EF w/o significant valve disease), appropriate cortisol, normal TSH  -had prn midodrine up until discharge 4/12; also was restarted on home amlodipine at DC  -hold amlodipine  -remote problem list from cards ~2021 included chronic hypotension? Data  deficit  -responsive to IVF in the ED, cont NS at 75cc/hr and monitor, can add midodrine if BP dec again  -clear liquid diet, advance as tolerated for n/v    Recurrent treatment for presumed sepsis  -Has had nonspecific perinephric stranding on recent CT abd/pel which could potentially be caused of Opdiva (per [employer]To[employer], nephritis can occur, as well as elevation in serum Cr w/o renal impairment, typical onset 12-48 weeks after initiation but as early as 3 weeks)  -all prev Cx data has been negative, no veg on DAMIÁN; has followed w/ ID  -unclear if true sepsis or alternative process; per prior notes he does appear to improve w/ Abx then deteriorate after discharge  -cont planned cefepime through 6/3/24; if he does not improve or deteriorates we will plan for ID re-eval  -repeat UrCx and blood Cx obtained in the ED on arrival    CKD stage 4 w/ elevated creatinine  -baseline Cr 2.2-3.3; eGFR 20's  -Cr significantly elevated from baseline but actually better than at discharge; cont supportive measures and monitor; saw nephro on prior admissions  -strict I&O  -labs tomorrow, if worse we will consider nephro involvement    NSCLC  -s/p neoadjuvant chemo, RLL lobectomy 1/2024, immunotherapy (last dose opdiva 4/4/24)  -follows w/ Dr. Ashley    CAD  AV block s/p ppm  Hx HTN  -cont statin, holding amlodipine    Emphysema  Tobacco abuse  -Trelegy substitute   -prn nebs    Impaired glucose tolerance  -last A1c 6.3%      DVT prophylaxis:  Mechanical DVT prophylaxis orders are signed and held.            CODE STATUS:    Code Status and Medical Interventions:   Ordered at: 05/14/24 0751     Code Status (Patient has no pulse and is not breathing):    CPR (Attempt to Resuscitate)     Medical Interventions (Patient has pulse or is breathing):    Full Support       Expected Discharge   Expected Discharge Date: 5/17/2024; Expected Discharge Time:      Raulito Castillo DO  05/14/24

## 2024-05-14 NOTE — PLAN OF CARE
Goal Outcome Evaluation:      Patient is NSR on the monitor and on room air.  Alert and oriented times four.  Patient is up with standby assist.  Patient's family is requesting a cardiology, nephrology, and I&D consult, Dr. Castillo notified.  Patient arrived with a Left chest Port that was accessed Monday in clinic and placed in October. Bed in lowest position, phone and call light in reach.

## 2024-05-14 NOTE — OUTREACH NOTE
Medical Week 2 Survey      Flowsheet Row Responses   [facility] patient discharged from? [place]   Does the patient have one of the following disease processes/diagnoses(primary or secondary)? Other   Week 2 attempt successful? No   Unsuccessful attempts Attempt 1   Revoke Readmitted            Stephanie RODRIGUEZ - Registered Nurse

## 2024-05-15 LAB
ALBUMIN SERPL-MCNC: 2.9 G/DL (ref 3.5–5.2)
ANION GAP SERPL CALCULATED.3IONS-SCNC: 13 MMOL/L (ref 5–15)
BACTERIA SPEC AEROBE CULT: NO GROWTH
BASOPHILS # BLD AUTO: 0.1 10*3/MM3 (ref 0–0.2)
BASOPHILS NFR BLD AUTO: 0.5 % (ref 0–1.5)
BRUCELLA IGG SER QL IA: NEGATIVE
BRUCELLA IGM SER QL IA: NEGATIVE
BUN SERPL-MCNC: 42 MG/DL (ref 8–23)
BUN/CREAT SERPL: 7.3 (ref 7–25)
CALCIUM SPEC-SCNC: 8.6 MG/DL (ref 8.6–10.5)
CHLORIDE SERPL-SCNC: 102 MMOL/L (ref 98–107)
CO2 SERPL-SCNC: 18 MMOL/L (ref 22–29)
CREAT SERPL-MCNC: 5.74 MG/DL (ref 0.76–1.27)
DEPRECATED RDW RBC AUTO: 58.1 FL (ref 37–54)
EGFRCR SERPLBLD CKD-EPI 2021: 9.6 ML/MIN/1.73
EOSINOPHIL # BLD AUTO: 0.2 10*3/MM3 (ref 0–0.4)
EOSINOPHIL NFR BLD AUTO: 0.9 % (ref 0.3–6.2)
ERYTHROCYTE [DISTWIDTH] IN BLOOD BY AUTOMATED COUNT: 16.8 % (ref 12.3–15.4)
GLUCOSE SERPL-MCNC: 93 MG/DL (ref 65–99)
HCT VFR BLD AUTO: 27.1 % (ref 37.5–51)
HGB BLD-MCNC: 8.6 G/DL (ref 13–17.7)
IMM GRANULOCYTES # BLD AUTO: 0.2 10*3/MM3 (ref 0–0.05)
IMM GRANULOCYTES NFR BLD AUTO: 0.9 % (ref 0–0.5)
LYMPHOCYTES # BLD AUTO: 1.34 10*3/MM3 (ref 0.7–3.1)
LYMPHOCYTES NFR BLD AUTO: 6.4 % (ref 19.6–45.3)
MCH RBC QN AUTO: 30 PG (ref 26.6–33)
MCHC RBC AUTO-ENTMCNC: 31.7 G/DL (ref 31.5–35.7)
MCV RBC AUTO: 94.4 FL (ref 79–97)
MONOCYTES # BLD AUTO: 1.54 10*3/MM3 (ref 0.1–0.9)
MONOCYTES NFR BLD AUTO: 7.3 % (ref 5–12)
NEUTROPHILS NFR BLD AUTO: 17.68 10*3/MM3 (ref 1.7–7)
NEUTROPHILS NFR BLD AUTO: 84 % (ref 42.7–76)
NRBC BLD AUTO-RTO: 0 /100 WBC (ref 0–0.2)
PHOSPHATE SERPL-MCNC: 4.5 MG/DL (ref 2.5–4.5)
PLATELET # BLD AUTO: 286 10*3/MM3 (ref 140–450)
PMV BLD AUTO: 9.3 FL (ref 6–12)
POTASSIUM SERPL-SCNC: 4.7 MMOL/L (ref 3.5–5.2)
RBC # BLD AUTO: 2.87 10*6/MM3 (ref 4.14–5.8)
SODIUM SERPL-SCNC: 133 MMOL/L (ref 136–145)
WBC NRBC COR # BLD AUTO: 21.06 10*3/MM3 (ref 3.4–10.8)

## 2024-05-15 PROCEDURE — 85025 COMPLETE CBC W/AUTO DIFF WBC: CPT | Performed by: INTERNAL MEDICINE

## 2024-05-15 PROCEDURE — 25810000003 SODIUM CHLORIDE 0.9 % SOLUTION: Performed by: INTERNAL MEDICINE

## 2024-05-15 PROCEDURE — 25010000002 CEFEPIME PER 500 MG: Performed by: INTERNAL MEDICINE

## 2024-05-15 PROCEDURE — 99232 SBSQ HOSP IP/OBS MODERATE 35: CPT | Performed by: HOSPITALIST

## 2024-05-15 PROCEDURE — 80069 RENAL FUNCTION PANEL: CPT | Performed by: INTERNAL MEDICINE

## 2024-05-15 PROCEDURE — 94664 DEMO&/EVAL PT USE INHALER: CPT

## 2024-05-15 PROCEDURE — 94799 UNLISTED PULMONARY SVC/PX: CPT

## 2024-05-15 PROCEDURE — 97161 PT EVAL LOW COMPLEX 20 MIN: CPT

## 2024-05-15 PROCEDURE — 97165 OT EVAL LOW COMPLEX 30 MIN: CPT

## 2024-05-15 RX ORDER — LOPERAMIDE HYDROCHLORIDE 2 MG/1
2 CAPSULE ORAL 4 TIMES DAILY PRN
Status: DISCONTINUED | OUTPATIENT
Start: 2024-05-15 | End: 2024-05-17 | Stop reason: HOSPADM

## 2024-05-15 RX ADMIN — TIOTROPIUM BROMIDE INHALATION SPRAY 2 PUFF: 3.12 SPRAY, METERED RESPIRATORY (INHALATION) at 07:56

## 2024-05-15 RX ADMIN — SODIUM CHLORIDE 75 ML/HR: 9 INJECTION, SOLUTION INTRAVENOUS at 20:45

## 2024-05-15 RX ADMIN — BUDESONIDE AND FORMOTEROL FUMARATE DIHYDRATE 2 PUFF: 160; 4.5 AEROSOL RESPIRATORY (INHALATION) at 20:21

## 2024-05-15 RX ADMIN — PANTOPRAZOLE SODIUM 40 MG: 40 TABLET, DELAYED RELEASE ORAL at 05:57

## 2024-05-15 RX ADMIN — BUDESONIDE AND FORMOTEROL FUMARATE DIHYDRATE 2 PUFF: 160; 4.5 AEROSOL RESPIRATORY (INHALATION) at 07:56

## 2024-05-15 RX ADMIN — PRAVASTATIN SODIUM 80 MG: 40 TABLET ORAL at 20:44

## 2024-05-15 RX ADMIN — Medication 10 ML: at 20:45

## 2024-05-15 RX ADMIN — LOPERAMIDE HYDROCHLORIDE 2 MG: 2 CAPSULE ORAL at 12:02

## 2024-05-15 RX ADMIN — CEFEPIME 2000 MG: 2 INJECTION, POWDER, FOR SOLUTION INTRAVENOUS at 08:45

## 2024-05-15 RX ADMIN — Medication 10 ML: at 08:46

## 2024-05-15 NOTE — PROGRESS NOTES
[facility] Medicine Services  PROGRESS NOTE    Patient Name: David Barfield  : 1950  MRN: [id number]    Date of Admission: 2024  Primary Care Physician: Hayley Castellanos APRN    Subjective   Subjective     CC:  F/U hypotension, N/V    HPI:  Seen this morning. Significant other at bedside. He reports some diarrhea today. Asking for Imodium. Denies chest pain, dyspnea, nausea, vomiting, abdominal pain.       Objective   Objective     Vital Signs:   Temp:  [98.1 °F (36.7 °C)-99.1 °F (37.3 °C)] 98.1 °F (36.7 °C)  Heart Rate:  [70-71] 70  Resp:  [16-20] 18  BP: (103-141)/(58-73) 141/73     Physical Exam:  Gen-no acute distress, chronically ill appearing  HENT-NCAT, mucous membranes moist  CV-RRR, S1 S2 normal, no m/r/g  Resp-CTAB, no wheezes or rales  Abd-soft, NT, ND, +BS  Ext-no edema  Neuro-A&Ox3, no focal deficits  Skin-no rashes  Psych-appropriate mood      Results Reviewed:  LAB RESULTS:      Lab 05/15/24  0418 24  0431 24  0339 24  0421 05/10/24  0324 24  0340   WBC 21.06*  --  23.43* 11.07* 10.73 11.78*   HEMOGLOBIN 8.6*  --  10.3* 9.2* 8.7* 8.4*   HEMATOCRIT 27.1*  --  32.3* 27.8* 25.9* 25.5*   PLATELETS 286  --  320 216 192 199   NEUTROS ABS 17.68*  --  20.24* 7.64* 7.90* 9.31*   IMMATURE GRANS (ABS) 0.20*  --  0.40* 0.09* 0.04 0.07*   LYMPHS ABS 1.34  --  0.73 1.41 1.12 0.87   MONOS ABS 1.54*  --  1.91* 1.70* 1.45* 1.28*   EOS ABS 0.20  --  0.08 0.17 0.17 0.19   MCV 94.4  --  91.8 88.3 87.2 88.9   SED RATE  --   --  98*  --   --   --    CRP  --  3.78*  --   --   --   --    PROCALCITONIN  --  1.81*  --   --   --   --    LACTATE  --   --  1.9  --   --   --          Lab 05/15/24  0418 24  04324  04024  0421 05/10/24  0324 24  0340   SODIUM 133* 136 139 138 133* 132*   POTASSIUM 4.7 4.8 4.1 3.7 4.2 4.1   CHLORIDE 102 101 97* 95* 92* 98   CO2 18.0* 20.0* 27.0 29.0 24.0 20.0*   ANION GAP 13.0 15.0 15.0 14.0 17.0* 14.0    BUN 42* 37* 39* 44* 49* 51*   CREATININE 5.74* 5.23* 5.99* 7.08* 7.64* 7.72*   EGFR 9.6* 10.8* 9.2* 7.5* 6.8* 6.8*   GLUCOSE 93 113* 99 116* 108* 97   CALCIUM 8.6 8.6 8.9 8.6 8.4* 8.7   MAGNESIUM  --   --   --   --   --  2.6*   PHOSPHORUS 4.5  --  5.1* 5.1* 5.9* 5.0*         Lab 05/15/24  0418 05/14/24  0431 05/12/24  0402 05/11/24  0421 05/10/24  0324   TOTAL PROTEIN  --  6.8  --   --   --    ALBUMIN 2.9* 3.1* 3.4* 3.1* 2.7*   GLOBULIN  --  3.7  --   --   --    ALT (SGPT)  --  10  --   --   --    AST (SGOT)  --  17  --   --   --    BILIRUBIN  --  0.4  --   --   --    ALK PHOS  --  74  --   --   --    LIPASE  --  17  --   --   --          Lab 05/14/24 0431   PROBNP 857.5   HSTROP T 34*                 Brief Urine Lab Results  (Last result in the past 365 days)        Color   Clarity   Blood   Leuk Est   Nitrite   Protein   CREAT   Urine HCG        05/14/24 0616 Yellow   Turbid   Small (1+)   Large (3+)   Negative   >=300 mg/dL (3+)                   Microbiology Results Abnormal       Procedure Component Value - Date/Time    Urine Culture - Urine, Urine, Catheter [[id number]]  (Normal) Collected: 05/14/24 0616    Lab Status: Final result Specimen: Urine, Catheter Updated: 05/15/24 1120     Urine Culture No growth    Blood Culture - Blood, Arm, Right [[id number]]  (Normal) Collected: 05/14/24 0412    Lab Status: Preliminary result Specimen: Blood from Arm, Right Updated: 05/15/24 0446     Blood Culture No growth at 24 hours    Blood Culture - Blood, Arm, Left [[id number]]  (Normal) Collected: 05/14/24 0439    Lab Status: Preliminary result Specimen: Blood from Arm, Left Updated: 05/15/24 0446     Blood Culture No growth at 24 hours    Respiratory Panel PCR w/COVID-19(SARS-CoV-2) [facility] In-House, NP Swab in UTM/VTM, 2 HR TAT - Swab, Nasopharynx [[id number]]  (Normal) Collected: 05/14/24 0808    Lab Status: Final result Specimen: Swab from Nasopharynx Updated: 05/14/24 0925     ADENOVIRUS, PCR Not  Detected     Coronavirus 229E Not Detected     Coronavirus HKU1 Not Detected     Coronavirus NL63 Not Detected     Coronavirus OC43 Not Detected     COVID19 Not Detected     Human Metapneumovirus Not Detected     Human Rhinovirus/Enterovirus Not Detected     Influenza A PCR Not Detected     Influenza B PCR Not Detected     Parainfluenza Virus 1 Not Detected     Parainfluenza Virus 2 Not Detected     Parainfluenza Virus 3 Not Detected     Parainfluenza Virus 4 Not Detected     RSV, PCR Not Detected     Bordetella pertussis pcr Not Detected     Bordetella parapertussis PCR Not Detected     Chlamydophila pneumoniae PCR Not Detected     Mycoplasma pneumo by PCR Not Detected    Narrative:      In the setting of a positive respiratory panel with a viral infection PLUS a negative procalcitonin without other underlying concern for bacterial infection, consider observing off antibiotics or discontinuation of antibiotics and continue supportive care. If the respiratory panel is positive for atypical bacterial infection (Bordetella pertussis, Chlamydophila pneumoniae, or Mycoplasma pneumoniae), consider antibiotic de-escalation to target atypical bacterial infection.    COVID-19, FLU A/B, RSV PCR 1 HR TAT - Swab, Nasopharynx [[id number]]  (Normal) Collected: 05/14/24 0416    Lab Status: Final result Specimen: Swab from Nasopharynx Updated: 05/14/24 0513     COVID19 Not Detected     Influenza A PCR Not Detected     Influenza B PCR Not Detected     RSV, PCR Not Detected    Narrative:      Fact sheet for providers: https://www.fda.gov/media/434357/download    Fact sheet for patients: https://www.fda.gov/media/646852/download    Test performed by PCR.            XR Chest 1 View    Result Date: 5/14/2024  Examination: XR CHEST 1 VW-  Date of Exam: 5/14/2024 3:48 AM  Indication: cough.  Comparison: 5/11/2024.  Technique: 1 view of the chest  Findings: No significant consolidation. Small right-sided pleural effusion present, stable to  improved as compared to the previous study. Stable right subclavian AICD/pacemaker device. Stable left-sided Mediport. No pneumothorax. The heart size is normal. The pulmonary vasculature appears within normal limits. No acute osseous abnormality identified.      Impression: Small right-sided pleural effusion, stable to improved as compared to the previous study. No significant airspace disease.  This report was finalized on 5/14/2024 4:10 AM by Desiree Matthews MD.       Results for orders placed during the hospital encounter of 05/05/24    Adult Transesophageal Echo 3D (DAMIÁN) W/ Cont If Necessary Per Protocol    Interpretation Summary    Left ventricular systolic function is normal. Left ventricular ejection fraction appears to be 61 - 65%. Normal left ventricular cavity size noted. Left ventricular wall thickness is consistent with mild concentric hypertrophy. All left ventricular wall segments contract normally.    There is mild calcification of the aortic valve. The aortic valve appears trileaflet. Mild aortic valve regurgitation is present. No aortic valve stenosis is present. There is no evidence of an aortic valve mass is present.    There is mild calcification of the mitral valve. There is mild, bileaflet mitral valve thickening present. No evidence of a mitral valve mass is present. Mild to moderate mitral valve regurgitation is present. No significant mitral valve stenosis is present.    The tricuspid valve is structurally normal with no significant stenosis present. There is no evidence of a mass on the tricuspid valve. Trace tricuspid valve regurgitation is present.    The pulmonic valve is grossly normal in structure. There is trace pulmonic valve regurgitation present.    No vegetation seen on atrial aspect of pacemaker leads.  The most distal aspects of the RV lead were not completely visualized however there was no apparent vegetation in the more proximal segment, adjacent to the tricuspid valve which  appears to be functioning normally.      Current medications:  Scheduled Meds:budesonide-formoterol, 2 puff, Inhalation, BID - RT   And  tiotropium bromide monohydrate, 2 puff, Inhalation, Daily - RT  cefepime, 2,000 mg, Intravenous, Q24H  pantoprazole, 40 mg, Oral, Q AM  pravastatin, 80 mg, Oral, Nightly  sodium chloride, 10 mL, Intravenous, Q12H      Continuous Infusions:sodium chloride, 75 mL/hr, Last Rate: 75 mL/hr (05/14/24 1758)      PRN Meds:.  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        Brief Hospital Course to date:  David Barfield is a 74 y.o. male with hx of NSCLC s/p neoadjuvant chemo, RLL lobectomy 1/2024, subsequent immunotherapy with Opdivo (last dose ~4/4/24), CAD, HTN, AV block s/p PPM, hypotension, emphysema, tobacco use, CKD 4-5, with 3 admissions since last month. Recent admissions have been for sepsis of unknown source, s/p multiple courses of ATBx, all culture data has been negative including Karius test by ID. Imaging has been positive for nonspecific bilateral perinephric stranding only with negative urine cultures. He was planned for IV Cefepime through 6/3/24 (discharged from [facility] 5/12/24). Received his dose in the clinic on 5/13/24, later that evening developed severe nausea with dry heaving. Next morning his BP was in the 80's at home, EMS  called. BP 99/87 on arrival to [facility] ED, did get as low 75/56. Improved with IV fluids. Admitted for further management.    This patient's problems and plans were partially entered by my partner and updated as appropriate by me 05/15/24. Copied text in this note has been reviewed and is accurate as of today's date.     Nausea and vomiting, resolved  Hypotension, fluid responsive  --Recent workup included (but not limited to), TTE (normal EF without significant valvular disease), appropriate cortisol, normal TSH  --Had PRN midodrine up until discharge 4/12/24; also was restarted on home Amlodipine at DC  --Hold Amlodipine for now, may decrease dose to 2.5 mg and use only AS NEEDED for elevated BP given his recurrent issues with hypotension  --Remote problem list from Cardiology ~2021 included chronic hypotension? Data deficit; last Cardiology office note 7/5/23 stated he was on PRN Lisinopril 2.5 mg for SBP >140   --Continue IV fluids through tomorrow morning, then stop and monitor  --Add PRN midodrine if needed - significant other requesting prescription at discharge   --Supportive care with antiemetics, advance diet as tolerated  --No complaints of N/V or abdominal pain today, does report some diarrhea - possibly related to ATBx? Will trial Imodium     Recurrent treatment for presumed sepsis  --Has had nonspecific perinephric stranding on recent CT A/P which could potentially be caused by Opdivo (per [employer], nephritis can occur, as well as elevation in serum Cr without renal impairment, typical onset 12-48 weeks after initiation but as early as 3 weeks)  --All previous culture data has been negative, no vegetations on DAMIÁN; has followed with ID/Dr. Derian Barry  --Unclear if true sepsis or alternative process; per prior notes he does appear to improve with ATBx then deteriorate after discharge  --Continue previous planned course of IV Cefepime through 6/3/24; if he does not improve or deteriorates we will plan  for ID re-evalulation  --Repeat urine and blood cultures pending - UA did show 1+ bacteria this time, F/U final results     CKD stage 4 with elevated Cr  --Baseline Cr 2.2-3.3; eGFR 20's  --Cr significantly elevated from baseline but actually better than at discharge (had peaked at 7.64 last admission, was 5.99 on day of discharge 5/12/24)  --Has seen Nephrology during previous admissions, patient had good urine output, some proteinuria noted last admission; felt no indication for dialysis, possibly related to immunotherapy?  --Significant other requesting Nephrology consultation  --Cr currently appears stable ~5.7, suspect will continue to fluctuate     NSCLC  --s/p neoadjuvant chemo, RLL lobectomy 1/2024, immunotherapy (last dose Opdivo 4/4/24)  --Follows with Dr. Ashley, further treatment on hold due to renal and infectious issues - will need to reschedule follow up appointment with her as he missed the 5/8/24 appointment due to hospitalization (she did see him in the hospital however)     CAD  AV block s/p PPM  Hx HTN  --Continue statin, holding Amlodipine due to hypotension    Chronic anemia  --Monitor, stable overall     Emphysema  Tobacco abuse  --Trelegy substitute   --PRN nebs     Impaired glucose tolerance  --Last HbA1c 6.3%    Expected Discharge Location and Transportation: home  Expected Discharge   Expected Discharge Date: 5/17/2024; Expected Discharge Time:      DVT prophylaxis:  Mechanical DVT prophylaxis orders are present.         AM-PAC 6 Clicks Score (PT): 24 (05/15/24 1026)    CODE STATUS:   Code Status and Medical Interventions:   Ordered at: 05/14/24 0754     Code Status (Patient has no pulse and is not breathing):    CPR (Attempt to Resuscitate)     Medical Interventions (Patient has pulse or is breathing):    Full Support       Yamile Hays MD  05/15/24

## 2024-05-15 NOTE — PLAN OF CARE
Goal Outcome Evaluation:  Plan of Care Reviewed With: patient, significant other        Progress: no change  Outcome Evaluation: OT evaluation completed. The pt presents at or near his functional baseline of independence during ADLs and functional mobility. The pt performed LBD, sink side grooming ADLs, and meal prep set up independently. The pt ambulated >HH distance with supervision, no AD. Pt's BP was monitored and had a consistent initial/post reading, while pt's O2 remained at 100% throughout session. Currently the pt has no IP OT needs, OT will sign off. Recommend a d/c home with assist when medically ready.      Anticipated Discharge Disposition (OT): home with assist

## 2024-05-15 NOTE — THERAPY DISCHARGE NOTE
Patient Name: David Barfield  : 1950    MRN: [id number]                              Today's Date: 5/15/2024       Admit Date: 2024    Visit Dx:     ICD-10-CM ICD-9-CM   1. Acute sepsis  A41.9 038.9     995.91   2. Chronic kidney disease, unspecified CKD stage  N18.9 585.9   3. Leukocytosis, unspecified type  D72.829 288.60   4. Pyelonephritis  N12 590.80     Patient Active Problem List   Diagnosis    High degree atrioventricular block    Bradycardia, sinus    Chronic hypotension    PVC's (premature ventricular contractions)    Presence of cardiac pacemaker    Mixed hyperlipidemia    Centrilobular emphysema    Nodule of lower lobe of right lung    Mediastinal adenopathy    Cough    Tobacco abuse    Bronchogenic cancer of right lung    Malignant neoplasm of lower lobe of right lung    Primary hypertension    GERD without esophagitis    Septic shock    Acute pyelonephritis    ARACELI (acute kidney injury)    Hypokalemia    Severe malnutrition    CAD (coronary artery disease)    Hypotension    Stage 4 chronic kidney disease     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    Coronary artery disease 2019    Diabetes mellitus Borderline    Emphysema/COPD     Erectile disorder     GERD (gastroesophageal reflux disease)     History of chemotherapy     Hyperlipidemia     Hypertension 2019    Lung nodule     Mumps     Mumps     Pruritus     after bath    Slow to wake up after anesthesia     Stage 5 chronic kidney disease not on chronic dialysis 2024    Wears dentures     upper only    Wears hearing aid in both ears     usually only wears right     Past Surgical History:   Procedure Laterality Date    BONE BIOPSY      broken bone surgery in his face    BRONCHOSCOPY THORACOTOMY Right 2024    Procedure: THORACOTOMY FOR LOWER LOBECTOMY AND MEDISTINAL LYMPH NODE DISSECTION RIGHT;  Surgeon:  Joey Patel MD;  Location:  [facility];  Service: Cardiothoracic;  Laterality: Right;    BRONCHOSCOPY WITH ION ROBOTIC ASSIST N/A 09/15/2023    Procedure: BRONCHOSCOPY NAVIGATION WITH ENDOBRONCHIAL ULTRASOUND AND ION ROBOT;  Surgeon: Octaviano Sampson MD;  Location:  [facility];  Service: Robotics - Pulmonary;  Laterality: N/A;  ion #6 - 0032  - 0015  Cath guide 0061    EBUS balloon removed and intact    CARDIAC ELECTROPHYSIOLOGY PROCEDURE N/A 08/17/2021    Procedure: Pacemaker DC new;  Surgeon: Kayy Box MD;  Location:  [facility];  Service: Cardiology;  Laterality: N/A;    FACIAL FRACTURE SURGERY      LYMPH NODE BIOPSY  2023    PACEMAKER IMPLANTATION        General Information       Row Name 05/15/24 0823          OT Time and Intention    Document Type discharge evaluation/summary  -KF     Mode of Treatment occupational therapy;individual therapy  -       Row Name 05/15/24 0823          General Information    Patient Profile Reviewed yes  -KF     Prior Level of Function independent:;all household mobility;community mobility;bed mobility;ADL's  Independent prior, no AD. Denies recent history of falls.  -KF     Existing Precautions/Restrictions no known precautions/restrictions  -KF     Barriers to Rehab medically complex  -KF       Row Name 05/15/24 0823          Occupational Profile    Environmental Supports and Barriers (Occupational Profile) walk-in shower with seat available  -       Row Name 05/15/24 0823          Living Environment    People in Home significant other  -       Row Name 05/15/24 0823          Home Main Entrance    Number of Stairs, Main Entrance one  -KF     Stair Railings, Main Entrance none  -KF       Row Name 05/15/24 0823          Stairs Within Home, Primary    Stairs, Within Home, Primary Pt states he resides in a two story house, however states his needs are met on the main floor.  -KF       Row Name 05/15/24 0823          Cognition     Orientation Status (Cognition) oriented x 4  -       Row Name 05/15/24 0823          Safety Issues, Functional Mobility    Safety Issues Affecting Function (Mobility) safety precaution awareness  -     Impairments Affecting Function (Mobility) endurance/activity tolerance;shortness of breath  -               User Key  (r) = Recorded By, (t) = Taken By, (c) = Cosigned By      Initials Name Provider Type     Ameena Antonio OT Occupational Therapist                     Mobility/ADL's       Row Name 05/15/24 0825          Bed Mobility    Bed Mobility supine-sit;sit-supine  -KF     Supine-Sit [place] (Bed Mobility) modified independence  -KF     Sit-Supine [place] (Bed Mobility) modified independence  -     Assistive Device (Bed Mobility) head of bed elevated  -     Comment, (Bed Mobility) No physical assistance needed  -       Row Name 05/15/24 0825          Transfers    Transfers sit-stand transfer;stand-sit transfer  -       Row Name 05/15/24 0825          Sit-Stand Transfer    Sit-Stand [place] (Transfers) independent  -       Row Name 05/15/24 0825          Stand-Sit Transfer    Stand-Sit [place] (Transfers) independent  -[facility] Name 05/15/24 0825          Functional Mobility    Functional Mobility- Ind. Level supervision required  -     Functional Mobility- Device other (see comments)  no AD  -KF     Functional Mobility-Distance (Feet) --  >HH distance  -KF     Functional Mobility- Comment Pt ambulated >HH distance with supervision, no AD. Pt with increased pace, no LOB. Pt appeared SOA at conclusion of mobility, however O2 remained at 100% on RA.  -       Row Name 05/15/24 0825          Activities of Daily Living    BADL Assessment/Intervention lower body dressing;grooming;feeding  -       Row Name 05/15/24 0825          Lower Body Dressing Assessment/Training    [place] Level (Lower Body Dressing) don;socks;independent  -     Position (Lower Body  Dressing) edge of bed sitting  -KF       Row Name 05/15/24 0825          Grooming Assessment/Training    [place] Level (Grooming) wash face, hands;independent  -KF     Position (Grooming) sink side  -[facility] Name 05/15/24 0825          Self-Feeding Assessment/Training    [place] Level (Feeding) prepare tray/open items;liquids to mouth;scoop food and bring to mouth;independent  -KF     Position (Self-Feeding) sitting up in bed  -KF               User Key  (r) = Recorded By, (t) = Taken By, (c) = Cosigned By      Initials Name Provider Type    KF Ameena Antonio OT Occupational Therapist                   Obj/Interventions       Row Name 05/15/24 0827          Sensory Assessment (Somatosensory)    Sensory Assessment (Somatosensory) UE sensation intact  -[facility] Name 05/15/24 0827          Vision Assessment/Intervention    Visual Impairment/Limitations WFL  -[facility] Name 05/15/24 0827          Range of Motion Comprehensive    General Range of Motion bilateral upper extremity ROM WFL  -[facility] Name 05/15/24 0827          Strength Comprehensive (MMT)    Comment, General Manual Muscle Testing (MMT) Assessment BUE grossly 4+/5  -KF       [facility] Name 05/15/24 0827          Balance    Balance Assessment sitting static balance;sitting dynamic balance;sit to stand dynamic balance;standing static balance;standing dynamic balance  -KF     Static Sitting Balance independent  -KF     Dynamic Sitting Balance independent  -KF     Position, Sitting Balance unsupported;sitting edge of bed  -KF     Sit to Stand Dynamic Balance independent  -KF     Static Standing Balance independent  -KF     Dynamic Standing Balance supervision  -KF     Position/Device Used, Standing Balance unsupported  -KF     Balance Interventions sitting;standing;sit to stand;supported;static;dynamic;occupation based/functional task  -KF               User Key  (r) = Recorded By, (t) = Taken By, (c) = Cosigned By      Initials Name  Provider Type    KF Ameena Antonio OT Occupational Therapist                   Goals/Plan    No documentation.                  Clinical Impression       Row Name 05/15/24 0827          Pain Assessment    Pretreatment Pain Rating 0/10 - no pain  -KF     Posttreatment Pain Rating 0/10 - no pain  -KF     Pain Intervention(s) Repositioned;Ambulation/increased activity  -KF       Row Name 05/15/24 0827          Plan of Care Review    Plan of Care Reviewed With patient;significant other  -KF     Progress no change  -KF     Outcome Evaluation OT evaluation completed. The pt presents at or near his functional baseline of independence during ADLs and functional mobility. The pt performed LBD, sink side grooming ADLs, and meal prep set up independently. The pt ambulated >HH distance with supervision, no AD. Pt's BP was monitored and had a consistent initial/post reading, while pt's O2 remained at 100% throughout session. Currently the pt has no IP OT needs, OT will sign off. Recommend a d/c home with assist when medically ready.  -       Row Name 05/15/24 0827          Therapy Assessment/Plan (OT)    Criteria for Skilled Therapeutic Interventions Met (OT) no problems identified which require skilled intervention  -     Therapy Frequency (OT) evaluation only  -       Row Name 05/15/24 0827          Therapy Plan Review/Discharge Plan (OT)    Anticipated Discharge Disposition (OT) home with assist  -[facility] Name 05/15/24 0827          Vital Signs    Pre Systolic BP Rehab 141  -KF     Pre Treatment Diastolic BP 73  -KF     Post Systolic BP Rehab 145  -KF     Post Treatment Diastolic BP 75  -KF     Pretreatment Heart Rate (beats/min) 81  -KF     Posttreatment Heart Rate (beats/min) 80  -KF     Pre SpO2 (%) 100  -KF     O2 Delivery Pre Treatment room air  -KF     Post SpO2 (%) 100  -KF     O2 Delivery Post Treatment room air  -KF     Pre Patient Position Supine  -KF     Intra Patient Position Standing  -KF     Post  Patient Position Supine  -KF       Row Name 05/15/24 0827          Positioning and Restraints    Pre-Treatment Position in bed  -KF     Post Treatment Position bed  -KF     In Bed notified nsg;supine;call light within reach;encouraged to call for assist;with family/caregiver  bed alarm unchanged  -KF               User Key  (r) = Recorded By, (t) = Taken By, (c) = Cosigned By      Initials Name Provider Type    Ameena Camejo OT Occupational Therapist                   Outcome Measures       Row Name 05/15/24 0830          How much help from another is currently needed...    Putting on and taking off regular lower body clothing? 4  -KF     Bathing (including washing, rinsing, and drying) 3  -KF     Toileting (which includes using toilet bed pan or urinal) 4  -KF     Putting on and taking off regular upper body clothing 4  -KF     Taking care of personal grooming (such as brushing teeth) 4  -KF     Eating meals 4  -KF     AM-PAC 6 Clicks Score (OT) 23  -KF       Row Name 05/15/24 0830          Functional Assessment    Outcome Measure Options AM-PAC 6 Clicks Daily Activity (OT)  -KF               User Key  (r) = Recorded By, (t) = Taken By, (c) = Cosigned By      Initials Name Provider Type    Ameena Camejo OT Occupational Therapist                    Occupational Therapy Education       Title: PT OT SLP Therapies (In Progress)       Topic: Occupational Therapy (In Progress)       Point: ADL training (Done)       Description:   Instruct learner(s) on proper safety adaptation and remediation techniques during self care or transfers.   Instruct in proper use of assistive devices.                  Learning Progress Summary             Patient Acceptance, E,TB, VU,DU by  at 5/15/2024 0800                         Point: Home exercise program (Not Started)       Description:   Instruct learner(s) on appropriate technique for monitoring, assisting and/or progressing therapeutic exercises/activities.                   Learner Progress:  Not documented in this visit.              Point: Precautions (Done)       Description:   Instruct learner(s) on prescribed precautions during self-care and functional transfers.                  Learning Progress Summary             Patient Acceptance, E,TB, VU,DU by  at 5/15/2024 0800                         Point: Body mechanics (Done)       Description:   Instruct learner(s) on proper positioning and spine alignment during self-care, functional mobility activities and/or exercises.                  Learning Progress Summary             Patient Acceptance, E,TB, VU,DU by  at 5/15/2024 0800                                         User Key       Initials Effective Dates Name Provider Type Discipline     08/09/23 -  Amenea Antonio OT Occupational Therapist OT                  OT Recommendation and Plan  Therapy Frequency (OT): evaluation only  Plan of Care Review  Plan of Care Reviewed With: patient, significant other  Progress: no change  Outcome Evaluation: OT evaluation completed. The pt presents at or near his functional baseline of independence during ADLs and functional mobility. The pt performed LBD, sink side grooming ADLs, and meal prep set up independently. The pt ambulated >HH distance with supervision, no AD. Pt's BP was monitored and had a consistent initial/post reading, while pt's O2 remained at 100% throughout session. Currently the pt has no IP OT needs, OT will sign off. Recommend a d/c home with assist when medically ready.     Time Calculation:   Evaluation Complexity (OT)  Review Occupational Profile/Medical/Therapy History Complexity: brief/low complexity  Assessment, Occupational Performance/Identification of Deficit Complexity: 1-3 performance deficits  Clinical Decision Making Complexity (OT): problem focused assessment/low complexity  Overall Complexity of Evaluation (OT): low complexity     Time Calculation- OT       Row Name 05/15/24 0830             Time  Calculation- OT    OT Start Time 0800  -KF      OT Received On 05/15/24  -KF         Untimed Charges    OT Eval/Re-eval Minutes 36  -KF         Total Minutes    Untimed Charges Total Minutes 36  -KF       Total Minutes 36  -KF                User Key  (r) = Recorded By, (t) = Taken By, (c) = Cosigned By      Initials Name Provider Type    KF Ameena Antonio OT Occupational Therapist                  Therapy Charges for Today       Code Description Service Date Service Provider Modifiers Qty    [id number]  OT EVAL LOW COMPLEXITY 3 5/15/2024 Ameena Antonio OT GO 1                 Ameena Antonio OT  5/15/2024

## 2024-05-15 NOTE — PROGRESS NOTES
"          Clinical Nutrition Assessment     Patient Name: David Barfield  YOB: 1950  MRN: [id number]  Date of Encounter: 05/15/24 13:50 EDT  Admission date: 5/14/2024  Reason for Visit: MST score 2+, Unintentional weight loss, Reduced oral intake    Assessment   Nutrition Assessment   Admission Diagnosis:  Pyelonephritis [N12]  Hypotension [I95.9]    Problem List:    Hypotension    Presence of cardiac pacemaker    Centrilobular emphysema    Tobacco abuse    Bronchogenic cancer of right lung    Stage 4 chronic kidney disease      PMH:   He  has a past medical history of Abnormal ECG, Arrhythmia (2019), Asthma (2019), Bronchogenic cancer of right lung (10/04/2023), Coronary artery disease (2019), Diabetes mellitus (Borderline), Emphysema/COPD, Erectile disorder, GERD (gastroesophageal reflux disease), History of chemotherapy, Hyperlipidemia, Hypertension (2019), Lung nodule, Mumps, Mumps, Pruritus, Slow to wake up after anesthesia, Stage 5 chronic kidney disease not on chronic dialysis (5/14/2024), Wears dentures, and Wears hearing aid in both ears.    PSH:  He  has a past surgical history that includes Bone biopsy; Facial fracture surgery; Cardiac electrophysiology procedure (N/A, 08/17/2021); BRONCHOSCOPY WITH ION ROBOTIC ASSIST (N/A, 09/15/2023); Pacemaker Implantation; bronchoscopy thoracotomy (Right, 01/09/2024); and Lymph node biopsy (2023).    Applicable Nutrition History:   4/25/24: Chronic Severe  4/12/24: Chronic Severe    Anthropometrics     Height: Height: 182.9 cm (72\")  Last Filed Weight: Weight: 78 kg (172 lb) (05/15/24 0500)  Method: Weight Method: Standing scale  BMI: BMI (Calculated): 23.3    UBW:     Weight     Weight (kg) Weight (lbs)   10/24/2023 87.998 kg  194 lb      Weight change:  11% significant weight loss in the past 6 mo    Nutrition Focused Physical Exam    Date:  05/15/24       Patient meets criteria for malnutrition diagnosis, see MSA note.     Subjective "   Reported/Observed/Food/Nutrition Related History:     05/15/24  Wife at bedside reported patient has been unable to stop losing weight since starting immunotherapy. Patient endorsed eating better lately. Is unable to tolerate boost anymore and did not like magic cup from previous admit. Agreeable to trial beneprotein TID. Wife had questions regarding awa stimulants. Stated he has been on some previously that did not work for the patient. Answered questions as able and advised to speak with provider regarding addition of any appetite stimulant.    Current Nutrition Prescription   PO: Diet: Renal; Low Potassium; Fluid Consistency: Thin (IDDSI 0)  Oral Nutrition Supplement: n/a  Intake:  Last three meals documented: 100, 100, 25% PO intake    Assessment & Plan   Nutrition Diagnosis   Date:  05/15/24             Updated:    Problem Malnutrition  severe   Etiology Chronic illness   Signs/Symptoms Severe weight loss, moderate muscle/fat loss   Status: New    Date:  05/15/24     Updated:     Problem Limited Food acceptance   Etiology GI discomfort   Signs/Symptoms Continued weight loss, limited food/loida acceptance   Status: New    Goal:   Nutrition to support treatment and Continue positive trend      Nutrition Intervention      Follow treatment progress, Care plan reviewed, Advise alternate selection, Advised available snacks, Interview for preferences, Encourage intake, Supplement provided    Patient meets malnutrition criteria, see MSA for full assessment.  Will order beneprotein TID  Encourage adequate PO/ONS intake  Patient may benefit from an appetite stim, such as Megace, if medically appropriate    Monitoring/Evaluation:   Per protocol, PO intake, Supplement intake, Pertinent labs, Symptoms, POC/GOC    Siena Colón, MS,RD,LD  Time Spent: 30

## 2024-05-15 NOTE — CONSULTS
Referring Provider: Dr. Yamile Hays  Reason for Consultation: ARACELI on CKD    Subjective     Chief complaint weakness, hypotension, nausea.    History of present illness:    74-year-old male with history of ARACELI on CKD 4-5 who will start to get kidney function better before he left the hospital came back day later with hypotension.  Patient has a history of non-small cell CA s/p new adjuvant therapy, right lower lobe lobectomy January 2024, subsequent immunotherapy with Opdivo, CAD, hypertension, AV block s/p PPM, hypotension, emphysema, tobacco use.  He has multiple admissions over the last few months.  Patient has been getting IV antibiotics with infectious disease.  Patient went to home, later started developing nausea and dry heaves, blood pressure was in the 80s, wife called in the ambulance and brought into the ED.  At the time of his discharge from the hospital creatinine was going down to 5.23.  Patient's creatinine is 5.7 today the BUN 42.  Last admission patient peak creatinine 7.64.  Patient denies epistaxis hemoptysis hematemesis gross hematuria.    History  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    Coronary artery disease 2019    Diabetes mellitus Borderline    Emphysema/COPD     Erectile disorder     GERD (gastroesophageal reflux disease)     History of chemotherapy     Hyperlipidemia     Hypertension 2019    Lung nodule     Mumps     Mumps     Pruritus     after bath    Slow to wake up after anesthesia     Stage 5 chronic kidney disease not on chronic dialysis 5/14/2024    Wears dentures     upper only    Wears hearing aid in both ears     usually only wears right   ,   Past Surgical History:   Procedure Laterality Date    BONE BIOPSY      broken bone surgery in his face    BRONCHOSCOPY THORACOTOMY Right 01/09/2024    Procedure: THORACOTOMY FOR LOWER LOBECTOMY AND MEDISTINAL LYMPH NODE  DISSECTION RIGHT;  Surgeon: Joey Patel MD;  Location:  [facility];  Service: Cardiothoracic;  Laterality: Right;    BRONCHOSCOPY WITH ION ROBOTIC ASSIST N/A 09/15/2023    Procedure: BRONCHOSCOPY NAVIGATION WITH ENDOBRONCHIAL ULTRASOUND AND ION ROBOT;  Surgeon: Octaviano Sampson MD;  Location:  [facility];  Service: Robotics - Pulmonary;  Laterality: N/A;  ion #6 - 0032  - 0015  Cath guide [id number]    EBUS balloon removed and intact    CARDIAC ELECTROPHYSIOLOGY PROCEDURE N/A 08/17/2021    Procedure: Pacemaker DC new;  Surgeon: Kayy Box MD;  Location:  [facility];  Service: Cardiology;  Laterality: N/A;    FACIAL FRACTURE SURGERY      LYMPH NODE BIOPSY  2023    PACEMAKER IMPLANTATION     ,   Family History   Problem Relation Age of Onset    Aneurysm Mother         brain    Dementia Father     Leukemia Sister     Heart disease Paternal Grandmother     Hypertension Paternal Grandfather     Cancer Sister    ,   Social History     Socioeconomic History    Marital status: Single    Number of children: 3   Tobacco Use    Smoking status: Every Day     Current packs/day: 0.50     Average packs/day: 0.5 packs/day for 56.4 years (28.7 ttl pk-yrs)     Types: Cigarettes     Start date: 1/1/1968    Smokeless tobacco: Never    Tobacco comments:     Still smoke   Vaping Use    Vaping status: Never Used   Substance and Sexual Activity    Alcohol use: Never    Drug use: Never    Sexual activity: Yes     Partners: Female     Birth control/protection: None     E-cigarette/Vaping    E-cigarette/Vaping Use Never User     Passive Exposure No     Counseling Given No      E-cigarette/Vaping Substances    Nicotine No     THC No     CBD No     Flavoring No      E-cigarette/Vaping Devices    Disposable No     Pre-filled or Refillable Cartridge No     Refillable Tank No     Pre-filled Pod No          ,   Medications Prior to Admission   Medication Sig Dispense Refill Last Dose    amLODIPine (NORVASC) 5  MG tablet Take 1 tablet by mouth Daily for 30 days. 30 tablet 0 5/13/2024    B Complex Vitamins (vitamin b complex) capsule capsule Take 1 capsule by mouth Daily. OTC   5/13/2024    cefepime 2000 mg IVPB in 100 mL NS (MBP) Infuse 2,000 mg into a venous catheter Daily for 21 days. Indications: Infection with Dysregulated Inflammatory Response, Urinary Tract Infection   5/13/2024    ferrous sulfate 325 (65 FE) MG tablet Take 1 tablet by mouth Daily With Breakfast. OTC   5/13/2024    HYDROcodone-acetaminophen (Norco) 7.5-325 MG per tablet Take 1 tablet by mouth Every 6 (Six) Hours As Needed for Moderate Pain. 60 tablet 0 5/13/2024    omeprazole (priLOSEC) 40 MG capsule Take 1 capsule by mouth Daily. 30 capsule 5 5/13/2024    ondansetron (ZOFRAN) 8 MG tablet Take 1 tablet by mouth 3 (Three) Times a Day As Needed for Nausea or Vomiting. 30 tablet 2 5/13/2024    pravastatin (PRAVACHOL) 80 MG tablet Take 1 tablet by mouth Every Night. 30 tablet 11 5/13/2024    albuterol sulfate  (90 Base) MCG/ACT inhaler Inhale 2 puffs Every 4 (Four) Hours As Needed for Wheezing. 18 g 11 Unknown    fluticasone (VERAMYST) 27.5 MCG/SPRAY nasal spray 2 sprays into the nostril(s) as directed by provider 1 (One) Time As Needed for Rhinitis or Allergies. 6 mL 2 Unknown    Fluticasone-Umeclidin-Vilant (Trelegy Ellipta) 100-62.5-25 MCG/ACT inhaler Inhale 1 puff Daily. 3 each 3 Unknown    lidocaine-prilocaine (EMLA) 2.5-2.5 % cream Apply 1 application  topically to the appropriate area as directed As Needed (45-60 minutes prior to port access.  Cover with saran/plastic wrap.). 30 g 3 Unknown    sildenafil (VIAGRA) 100 MG tablet Take 0.5 tablets by mouth Daily As Needed for Erectile Dysfunction.   More than a month   , Scheduled Meds:  budesonide-formoterol, 2 puff, Inhalation, BID - RT   And  tiotropium bromide monohydrate, 2 puff, Inhalation, Daily - RT  cefepime, 2,000 mg, Intravenous, Q24H  pantoprazole, 40 mg, Oral, Q AM  pravastatin, 80  mg, Oral, Nightly  sodium chloride, 10 mL, Intravenous, Q12H   , Continuous Infusions:  sodium chloride, 75 mL/hr, Last Rate: 75 mL/hr (05/14/24 1758)   , PRN Meds:    acetaminophen **OR** acetaminophen **OR** acetaminophen    Albuterol Sulfate NEB Orderable    senna-docusate sodium **AND** polyethylene glycol **AND** bisacodyl **AND** bisacodyl    loperamide    ondansetron ODT **OR** ondansetron    sodium chloride    sodium chloride    sodium chloride, and Allergies:  Cymbalta [duloxetine hcl], Gabapentin, Remeron [mirtazapine], Toradol [ketorolac tromethamine], Latex, and Tape    Review of Systems  Pertinent items are noted in HPI, all other systems reviewed and negative    Objective     Vital Signs  Temp:  [98.1 °F (36.7 °C)-98.7 °F (37.1 °C)] 98.1 °F (36.7 °C)  Heart Rate:  [70-71] 71  Resp:  [18-20] 18  BP: (103-141)/(58-73) 108/70    I/O this shift:  In: -   Out: 1000 [Urine:1000]  I/O last 3 completed shifts:  In: 480 [P.O.:480]  Out: 1600 [Urine:1600]    Physical Exam:  General appearance: Sick looking  male no obvious distress.  HEENT: Atraumatic normocephalic head, eyes pupil reactive, extraocular muscle intact, nose no bleed, oropharynx clear, neck is supple, no JVD, no lymph node enlargement, trachea midline.  Lungs: Clear to auscultation, equal chest movement, no crepitation.  Heart: Normal S1, S2, no gallop, murmur, RRR.  Abdomen: Soft, nontender, positive bowel sounds, no organomegaly.  Extremities: No edema, no cyanosis, no joint swelling.  Neuro: Alert, oriented x4, no focal deficit.  Psych: Mood and affect are normal and appropriate.  Skin: Skin is warm and dry.  : No suprapubic fullness or tenderness, no Yeung catheter.    Results Review:   I reviewed the patient's new clinical results.    WBC WBC   Date Value Ref Range Status   05/15/2024 21.06 (H) 3.40 - 10.80 10*3/mm3 Final   05/14/2024 23.43 (H) 3.40 - 10.80 10*3/mm3 Final      HGB Hemoglobin   Date Value Ref Range Status  "  05/15/2024 8.6 (L) 13.0 - 17.7 g/dL Final   05/14/2024 10.3 (L) 13.0 - 17.7 g/dL Final      HCT Hematocrit   Date Value Ref Range Status   05/15/2024 27.1 (L) 37.5 - 51.0 % Final   05/14/2024 32.3 (L) 37.5 - 51.0 % Final      Platlets No results found for: \"LABPLAT\"   MCV MCV   Date Value Ref Range Status   05/15/2024 94.4 79.0 - 97.0 fL Final   05/14/2024 91.8 79.0 - 97.0 fL Final          Sodium Sodium   Date Value Ref Range Status   05/15/2024 133 (L) 136 - 145 mmol/L Final   05/14/2024 136 136 - 145 mmol/L Final      Potassium Potassium   Date Value Ref Range Status   05/15/2024 4.7 3.5 - 5.2 mmol/L Final   05/14/2024 4.8 3.5 - 5.2 mmol/L Final     Comment:     Slight hemolysis detected by analyzer. Result may be falsely elevated.      Chloride Chloride   Date Value Ref Range Status   05/15/2024 102 98 - 107 mmol/L Final   05/14/2024 101 98 - 107 mmol/L Final      CO2 CO2   Date Value Ref Range Status   05/15/2024 18.0 (L) 22.0 - 29.0 mmol/L Final   05/14/2024 20.0 (L) 22.0 - 29.0 mmol/L Final      BUN BUN   Date Value Ref Range Status   05/15/2024 42 (H) 8 - 23 mg/dL Final   05/14/2024 37 (H) 8 - 23 mg/dL Final      Creatinine Creatinine   Date Value Ref Range Status   05/15/2024 5.74 (H) 0.76 - 1.27 mg/dL Final   05/14/2024 5.23 (H) 0.76 - 1.27 mg/dL Final      Calcium Calcium   Date Value Ref Range Status   05/15/2024 8.6 8.6 - 10.5 mg/dL Final   05/14/2024 8.6 8.6 - 10.5 mg/dL Final      PO4 No results found for: \"CAPO4\"   Albumin Albumin   Date Value Ref Range Status   05/15/2024 2.9 (L) 3.5 - 5.2 g/dL Final   05/14/2024 3.1 (L) 3.5 - 5.2 g/dL Final      Magnesium No results found for: \"MG\"   Uric Acid No results found for: \"URICACID\"         budesonide-formoterol, 2 puff, Inhalation, BID - RT   And  tiotropium bromide monohydrate, 2 puff, Inhalation, Daily - RT  cefepime, 2,000 mg, Intravenous, Q24H  pantoprazole, 40 mg, Oral, Q AM  pravastatin, 80 mg, Oral, Nightly  sodium chloride, 10 mL, Intravenous, " Q12H      sodium chloride, 75 mL/hr, Last Rate: 75 mL/hr (05/14/24 3016)        Assessment & Plan       Hypotension    Presence of cardiac pacemaker    Centrilobular emphysema    Tobacco abuse    Bronchogenic cancer of right lung    Stage 4 chronic kidney disease      1.  Acute kidney failure creatinine peaked around 7.6 to last admission, patient went home with a creatinine slowly coming down to 5 range.  He was found hypotensive at home with nausea not feeling good and came back same day to the hospital.  Mild increase in creatinine is noted at this time.  2.  Proteinuria new finding on the last admission.  3.  S/p sepsis resolved patient on last admission was taken off the pressors and was placed on midodrine.  4.  Hyponatremia stable  5.  Volume: Need to drink more fluid.  6.  Metabolic acidosis.  Patient has been off bicarb drip last admission.  7.  Infectious disease: Patient's followed with ID consultants.  8.  S/p immunotherapy for non-small cell cancer last infusion 4/4/2024 Opdivo.    Recommendation:  Continue with IV fluids and oral hydration.  Check labs in the morning.  Keep systolic blood pressure greater than 100.  Avoid nephrotoxic medications.  High risk complex patient multiple medical problems.    I discussed the patients findings and my recommendations with patient, family, and nursing staff    Alok Dixon MD  05/15/24  @[facility]

## 2024-05-15 NOTE — PLAN OF CARE
Goal Outcome Evaluation:  Plan of Care Reviewed With: patient, significant other        Progress: no change  Outcome Evaluation: PT eval performed: Pt presenting close to baseline.  Pt able to ambulate 350', no AD, modif indep with c/o dizziness or lightheadedness.  BP did drop to 92/72 in standing by not symptomatic.  Recommend d/c home with assist and no further therapy services warranted.      Anticipated Discharge Disposition (PT): home with assist

## 2024-05-15 NOTE — THERAPY DISCHARGE NOTE
Patient Name: David Barfield  : 1950    MRN: [id number]                              Today's Date: 5/15/2024       Admit Date: 2024    Visit Dx:     ICD-10-CM ICD-9-CM   1. Acute sepsis  A41.9 038.9     995.91   2. Chronic kidney disease, unspecified CKD stage  N18.9 585.9   3. Leukocytosis, unspecified type  D72.829 288.60   4. Pyelonephritis  N12 590.80     Patient Active Problem List   Diagnosis    High degree atrioventricular block    Bradycardia, sinus    Chronic hypotension    PVC's (premature ventricular contractions)    Presence of cardiac pacemaker    Mixed hyperlipidemia    Centrilobular emphysema    Nodule of lower lobe of right lung    Mediastinal adenopathy    Cough    Tobacco abuse    Bronchogenic cancer of right lung    Malignant neoplasm of lower lobe of right lung    Primary hypertension    GERD without esophagitis    Septic shock    Acute pyelonephritis    ARACELI (acute kidney injury)    Hypokalemia    Severe malnutrition    CAD (coronary artery disease)    Hypotension    Stage 4 chronic kidney disease     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    Coronary artery disease 2019    Diabetes mellitus Borderline    Emphysema/COPD     Erectile disorder     GERD (gastroesophageal reflux disease)     History of chemotherapy     Hyperlipidemia     Hypertension 2019    Lung nodule     Mumps     Mumps     Pruritus     after bath    Slow to wake up after anesthesia     Stage 5 chronic kidney disease not on chronic dialysis 2024    Wears dentures     upper only    Wears hearing aid in both ears     usually only wears right     Past Surgical History:   Procedure Laterality Date    BONE BIOPSY      broken bone surgery in his face    BRONCHOSCOPY THORACOTOMY Right 2024    Procedure: THORACOTOMY FOR LOWER LOBECTOMY AND MEDISTINAL LYMPH NODE DISSECTION RIGHT;  Surgeon:  Joey Patel MD;  Location: [facility] OR;  Service: Cardiothoracic;  Laterality: Right;    BRONCHOSCOPY WITH ION ROBOTIC ASSIST N/A 09/15/2023    Procedure: BRONCHOSCOPY NAVIGATION WITH ENDOBRONCHIAL ULTRASOUND AND ION ROBOT;  Surgeon: Octaviano Sampson MD;  Location: [facility];  Service: Robotics - Pulmonary;  Laterality: N/A;  ion #6 - 0032  - 0015  Cath guide 0061    EBUS balloon removed and intact    CARDIAC ELECTROPHYSIOLOGY PROCEDURE N/A 08/17/2021    Procedure: Pacemaker DC new;  Surgeon: Kayy Box MD;  Location:  [facility];  Service: Cardiology;  Laterality: N/A;    FACIAL FRACTURE SURGERY      LYMPH NODE BIOPSY  2023    PACEMAKER IMPLANTATION        General Information       Row Name 05/15/24 1012          Physical Therapy Time and Intention    Document Type discharge evaluation/summary  -KG     Mode of Treatment physical therapy  -KG       Row Name 05/15/24 1012          General Information    Patient Profile Reviewed yes  -KG     Prior Level of Function independent:;all household mobility;ADL's  -KG     Existing Precautions/Restrictions no known precautions/restrictions  -KG     Barriers to Rehab medically complex  -KG       Row Name 05/15/24 1012          Living Environment    People in Home friend(s)  -KG       Row Name 05/15/24 1012          Home Main Entrance    Number of Stairs, Main Entrance one  -KG     Stair Railings, Main Entrance none  -KG       Row Name 05/15/24 1012          Cognition    Orientation Status (Cognition) oriented x 4  -KG       Row Name 05/15/24 1012          Safety Issues, Functional Mobility    Safety Issues Affecting Function (Mobility) safety precaution awareness  -KG     Impairments Affecting Function (Mobility) endurance/activity tolerance;shortness of breath  -KG               User Key  (r) = Recorded By, (t) = Taken By, (c) = Cosigned By      Initials Name Provider Type    KG Anju Hernandez Physical Therapist                    Mobility       Row Name 05/15/24 1015          Bed Mobility    Bed Mobility supine-sit;sit-supine  -KG     Supine-Sit [place] (Bed Mobility) modified independence  -KG     Sit-Supine [place] (Bed Mobility) modified independence  -KG     Assistive Device (Bed Mobility) head of bed elevated  -KG       Row Name 05/15/24 1015          Transfers    Comment, (Transfers) indep  -KG       Row Name 05/15/24 1015          Sit-Stand Transfer    Sit-Stand [place] (Transfers) independent  -KG       Row Name 05/15/24 1015          Gait/Stairs (Locomotion)    [place] Level (Gait) independent  -KG     Distance in Feet (Gait) 350  -KG               User Key  (r) = Recorded By, (t) = Taken By, (c) = Cosigned By      Initials Name Provider Type    Anju Gama Physical Therapist                   Obj/Interventions       Row Name 05/15/24 1019          Strength Comprehensive (MMT)    General Manual Muscle Testing (MMT) Assessment no strength deficits identified  -KG       Row Name 05/15/24 1019          Balance    Dynamic Standing Balance modified independence  -KG     Position/Device Used, Standing Balance unsupported  -KG     Balance Interventions standing;sit to stand  -KG               User Key  (r) = Recorded By, (t) = Taken By, (c) = Cosigned By      Initials Name Provider Type    Anju Gama Physical Therapist                   Goals/Plan    No documentation.                  Clinical Impression       [facility] Name 05/15/24 1019          Pain    Pretreatment Pain Rating 0/10 - no pain  -KG     Posttreatment Pain Rating 0/10 - no pain  -KG       [facility] Name 05/15/24 1019          Plan of Care Review    Plan of Care Reviewed With patient;significant other  -KG     Progress no change  -KG     Outcome Evaluation PT eval performed: Pt presenting close to baseline.  Pt able to ambulate 350', no AD, modif indep with c/o dizziness or lightheadedness.  BP did drop to 92/72 in standing by not symptomatic.   Recommend d/c home with assist and no further therapy services warranted.  -KG       Row Name 05/15/24 1019          Therapy Assessment/Plan (PT)    Criteria for Skilled Interventions Met (PT) no problems identified which require skilled intervention  -KG       Row Name 05/15/24 1019          Vital Signs    Pre Systolic BP Rehab 145  -KG     Pre Treatment Diastolic BP 75  -KG     Intra Systolic BP Rehab 92  -KG     Intra Treatment Diastolic BP 72  -KG     Post Systolic BP Rehab 118  -KG     Post Treatment Diastolic BP 67  -KG     Posttreatment Heart Rate (beats/min) 84  -KG     O2 Delivery Pre Treatment room air  -KG     O2 Delivery Intra Treatment room air  -KG     Post SpO2 (%) 99  -KG     O2 Delivery Post Treatment room air  -KG       Row Name 05/15/24 1019          Positioning and Restraints    Pre-Treatment Position in bed  -KG     Post Treatment Position bed  -KG     In Bed notified nsg;fowlers;call light within reach;encouraged to call for assist;with family/caregiver  -KG               User Key  (r) = Recorded By, (t) = Taken By, (c) = Cosigned By      Initials Name Provider Type    KG Anju Hernandez Physical Therapist                   Outcome Measures       Row Name 05/15/24 1026          How much help from another person do you currently need...    Turning from your back to your side while in flat bed without using bedrails? 4  -KG     Moving from lying on back to sitting on the side of a flat bed without bedrails? 4  -KG     Moving to and from a bed to a chair (including a wheelchair)? 4  -KG     Standing up from a chair using your arms (e.g., wheelchair, bedside chair)? 4  -KG     Climbing 3-5 steps with a railing? 4  -KG     To walk in hospital room? 4  -KG     AM-PAC 6 Clicks Score (PT) 24  -KG     Highest Level of Mobility Goal 8 --> Walked 250 feet or more  -KG       Row Name 05/15/24 1026 05/15/24 0830       Functional Assessment    Outcome Measure Options AM-PAC 6 Clicks Basic Mobility (PT)   -KG AM-PAC 6 Clicks Daily Activity (OT)  -KF              User Key  (r) = Recorded By, (t) = Taken By, (c) = Cosigned By      Initials Name Provider Type    GERSON Anju Hernandez Physical Therapist    KF Ameena Antonio OT Occupational Therapist                  Physical Therapy Education       Title: PT OT SLP Therapies (In Progress)       Topic: Physical Therapy (Not Started)       Point: Mobility training (Not Started)       Learner Progress:  Not documented in this visit.              Point: Home exercise program (Not Started)       Learner Progress:  Not documented in this visit.              Point: Body mechanics (Not Started)       Learner Progress:  Not documented in this visit.              Point: Precautions (Not Started)       Learner Progress:  Not documented in this visit.                                  PT Recommendation and Plan     Plan of Care Reviewed With: patient, significant other  Progress: no change  Outcome Evaluation: PT eval performed: Pt presenting close to baseline.  Pt able to ambulate 350', no AD, modif indep with c/o dizziness or lightheadedness.  BP did drop to 92/72 in standing by not symptomatic.  Recommend d/c home with assist and no further therapy services warranted.     Time Calculation:         PT Charges       Row Name 05/15/24 1028             Time Calculation    Start Time 0830  -KG      PT Received On 05/15/24  -KG      PT Goal Re-Cert Due Date 05/25/24  -KG                User Key  (r) = Recorded By, (t) = Taken By, (c) = Cosigned By      Initials Name Provider Type    GERSON Anju Hernandez Physical Therapist                  Therapy Charges for Today       Code Description Service Date Service Provider Modifiers Qty    [id number] HC PT EVAL LOW COMPLEXITY 4 5/15/2024 Anju Hernandez GP 1            PT G-Codes  Outcome Measure Options: AM-PAC 6 Clicks Basic Mobility (PT)  AM-PAC 6 Clicks Score (PT): 24  AM-PAC 6 Clicks Score (OT): 23    PT Discharge Summary  Anticipated  Discharge Disposition (PT): home with assist    Anju Hernandez  5/15/2024

## 2024-05-15 NOTE — CASE MANAGEMENT/SOCIAL WORK
Discharge Planning Assessment  [facility]     Patient Name: David Barfield  MRN: [id number]  Today's Date: 5/15/2024    Admit Date: 5/14/2024        Discharge Needs Assessment       Row Name 05/15/24 0941       Living Environment    People in Home friend(s)    Name(s) of People in Home Balaji Trinidad, friend [phone]    Current Living Arrangements home    Potentially Unsafe Housing Conditions unable to assess    In the past 12 months has the electric, gas, oil, or water company threatened to shut off services in your home? No    Primary Care Provided by self    Provides Primary Care For no one, unable/limited ability to care for self    Family Caregiver if Needed friend(s)    Family Caregiver Names Balaji Trinidad, friend [phone]    Quality of Family Relationships unable to assess    Able to Return to Prior Arrangements yes       Resource/Environmental Concerns    Resource/Environmental Concerns none    Transportation Concerns none       Transportation Needs    In the past 12 months, has lack of transportation kept you from medical appointments or from getting medications? no    In the past 12 months, has lack of transportation kept you from meetings, work, or from getting things needed for daily living? No       Food Insecurity    Within the past 12 months, you worried that your food would run out before you got the money to buy more. Never true    Within the past 12 months, the food you bought just didn't last and you didn't have money to get more. Never true       Transition Planning    Patient/Family Anticipates Transition to home    Patient/Family Anticipated Services at Transition none    Transportation Anticipated family or friend will provide       Discharge Needs Assessment    Readmission Within the Last 30 Days no previous admission in last 30 days    Equipment Currently Used at Home bp cuff    Concerns to be Addressed no discharge needs identified    Anticipated Changes Related to  Illness none    Equipment Needed After Discharge bp cuff    Current Discharge Risk dependent with mobility/activities of daily living    Discharge Coordination/Progress With verbal consent, I spoke with Pt, in room, to discuss discharge plan. Pt is admittd with hypotension. He lives with friend in [facility]. There is one flight of stairs in the home. He is independent with bathing/dressing, laundry, shopping and housekeeping. He requires assistance with med/meal prep. He has the following DME: BP cuff. His PCP is Hayley Castellanos Nurse Practitioner. He has Medicare and Misc Commercial insurance which has Rx coverage. He uses [facility]. He denies HH/O2. His goal is to return home at discharge. Friend will provide transportation. CM will continue to follow hospital course.                   Discharge Plan    No documentation.                 Continued Care and Services - Admitted Since 5/14/2024    No active coordination exists for this encounter.       Expected Discharge Date and Time       Expected Discharge Date Expected Discharge Time    May 17, 2024            Demographic Summary       Row Name 05/15/24 0920       General Information    Arrived From home    Reason for Consult discharge planning    Preferred Language English    General Information Comments PCP Hayley Castellanos, Nurse Practitioner       Contact Information    Permission Granted to Share Info With     Contact Information Obtained for     Contact Information Comments Balaji Trinidad, friend [phone], Gamaliel Barfield, son [phone], Maricel Montague, daughter [phone], Esperanza Chao, daughter [phone]                   Functional Status       Row Name 05/15/24 0941       Functional Status    Usual Activity Tolerance good    Current Activity Tolerance moderate       Functional Status, IADL    Medications assistive person    Meal Preparation assistive person    Housekeeping independent     Laundry independent    Shopping independent                   Psychosocial    No documentation.                  Abuse/Neglect    No documentation.                  Legal    No documentation.                  Substance Abuse    No documentation.                  Patient Forms    No documentation.                     Kiah Hagan RN

## 2024-05-15 NOTE — PROGRESS NOTES
Malnutrition Severity Assessment    Patient Name:  David Barfield  YOB: 1950  MRN: [id number]  Admit Date:  5/14/2024    Patient meets criteria for : Moderate (non-severe) Malnutrition    Comments:  Patient continues to meet chronic illness related moderate malnutrition criteria with indicators for severe weight loss and moderate muscle/fat loss.    Previously met chronic illness related severe malnutrition criteria on: 4/25/24 and 4/12/24    Malnutrition Severity Assessment  Malnutrition Type: Chronic Disease - Related Malnutrition  Malnutrition Type (Last 8 Hours)       Malnutrition Severity Assessment       Row Name 05/15/24 1406       Malnutrition Severity Assessment    Malnutrition Type Chronic Disease - Related Malnutrition      Row Name 05/15/24 1406       Unintentional Weight Loss     Unintentional Weight Loss Findings Severe    Unintentional Weight Loss  Weight loss greater than 10% in six months      Row Name 05/15/24 1406       Muscle Loss    Loss of Muscle Mass Findings Moderate    [place] Region Moderate - slight depression    Clavicle Bone Region Moderate - some protrusion in females, visible in males    Acromion Bone Region Moderate - acromion may slightly protrude    Dorsal Hand Region Moderate - slight depression    Patellar Region Moderate - patella more prominent, less muscle definition around patella    Anterior Thigh Region Moderate - mild depression on inner thigh    Posterior Calf Region Moderate - some roundness, slight firmness      Row Name 05/15/24 1406       Fat Loss    Subcutaneous Fat Loss Findings Moderate    Orbital Region  Moderate -  somewhat hollowness, slightly dark circles    Upper Arm Region Moderate - some fat tissue, not ample      Row Name 05/15/24 1406       Declining Functional Status    Declining Functional Status Findings N/A      Row Name 05/15/24 1406       Criteria Met (Must meet criteria for severity in at least 2 of these categories: M Wasting, Fat  Loss, Fluid, Secondary Signs, Wt. Status, Intake)    Patient meets criteria for  Moderate (non-severe) Malnutrition                    Electronically signed by:  Siena Colón MS,RD,LD  05/15/24 14:07 EDT

## 2024-05-15 NOTE — NURSING NOTE
Per patient and family,     Requested to not have vitals taken at 0000 due to need for sleep and current normotension

## 2024-05-16 LAB
ANION GAP SERPL CALCULATED.3IONS-SCNC: 13 MMOL/L (ref 5–15)
BUN SERPL-MCNC: 41 MG/DL (ref 8–23)
BUN/CREAT SERPL: 7.4 (ref 7–25)
CALCIUM SPEC-SCNC: 8.7 MG/DL (ref 8.6–10.5)
CHLORIDE SERPL-SCNC: 105 MMOL/L (ref 98–107)
CO2 SERPL-SCNC: 19 MMOL/L (ref 22–29)
CREAT SERPL-MCNC: 5.57 MG/DL (ref 0.76–1.27)
DEPRECATED RDW RBC AUTO: 59.8 FL (ref 37–54)
EGFRCR SERPLBLD CKD-EPI 2021: 10 ML/MIN/1.73
ERYTHROCYTE [DISTWIDTH] IN BLOOD BY AUTOMATED COUNT: 17 % (ref 12.3–15.4)
GLUCOSE SERPL-MCNC: 92 MG/DL (ref 65–99)
HCT VFR BLD AUTO: 28.1 % (ref 37.5–51)
HGB BLD-MCNC: 8.8 G/DL (ref 13–17.7)
MCH RBC QN AUTO: 30 PG (ref 26.6–33)
MCHC RBC AUTO-ENTMCNC: 31.3 G/DL (ref 31.5–35.7)
MCV RBC AUTO: 95.9 FL (ref 79–97)
PLATELET # BLD AUTO: 298 10*3/MM3 (ref 140–450)
PMV BLD AUTO: 8.9 FL (ref 6–12)
POTASSIUM SERPL-SCNC: 4.2 MMOL/L (ref 3.5–5.2)
QT INTERVAL: 412 MS
QTC INTERVAL: 460 MS
RBC # BLD AUTO: 2.93 10*6/MM3 (ref 4.14–5.8)
SODIUM SERPL-SCNC: 137 MMOL/L (ref 136–145)
WBC NRBC COR # BLD AUTO: 14.28 10*3/MM3 (ref 3.4–10.8)

## 2024-05-16 PROCEDURE — 85027 COMPLETE CBC AUTOMATED: CPT | Performed by: HOSPITALIST

## 2024-05-16 PROCEDURE — 80048 BASIC METABOLIC PNL TOTAL CA: CPT | Performed by: HOSPITALIST

## 2024-05-16 PROCEDURE — 25010000002 CEFEPIME PER 500 MG: Performed by: INTERNAL MEDICINE

## 2024-05-16 PROCEDURE — 99232 SBSQ HOSP IP/OBS MODERATE 35: CPT | Performed by: HOSPITALIST

## 2024-05-16 PROCEDURE — 94664 DEMO&/EVAL PT USE INHALER: CPT

## 2024-05-16 PROCEDURE — 94799 UNLISTED PULMONARY SVC/PX: CPT

## 2024-05-16 RX ADMIN — TIOTROPIUM BROMIDE INHALATION SPRAY 2 PUFF: 3.12 SPRAY, METERED RESPIRATORY (INHALATION) at 09:08

## 2024-05-16 RX ADMIN — Medication 10 ML: at 21:20

## 2024-05-16 RX ADMIN — PANTOPRAZOLE SODIUM 40 MG: 40 TABLET, DELAYED RELEASE ORAL at 05:44

## 2024-05-16 RX ADMIN — BUDESONIDE AND FORMOTEROL FUMARATE DIHYDRATE 2 PUFF: 160; 4.5 AEROSOL RESPIRATORY (INHALATION) at 19:26

## 2024-05-16 RX ADMIN — CEFEPIME 2000 MG: 2 INJECTION, POWDER, FOR SOLUTION INTRAVENOUS at 08:21

## 2024-05-16 RX ADMIN — PRAVASTATIN SODIUM 80 MG: 40 TABLET ORAL at 21:20

## 2024-05-16 RX ADMIN — BUDESONIDE AND FORMOTEROL FUMARATE DIHYDRATE 2 PUFF: 160; 4.5 AEROSOL RESPIRATORY (INHALATION) at 09:08

## 2024-05-16 NOTE — PLAN OF CARE
Goal Outcome Evaluation:                 No acute events overnight, remains normotensive

## 2024-05-16 NOTE — PROGRESS NOTES
"   LOS: 1 day    Patient Care Team:  Hayley Castellanos APRN as PCP - General (Nurse Practitioner)  Octaviano Sampson MD as Consulting Physician (Pulmonary Disease)  Neetu Ashley MD as Referring Physician (Hematology and Oncology)  Nmio Rodríguez MD as Consulting Physician (Radiation Oncology)    Chief Complaint:  Weakness    Subjective     Interval History:     No acute events overnight. No new complaints       Review of Systems:   No CP or SOA , fever, chills, rigors, rash, N/V, Constipation.       Objective     Vital Sign Min/Max for last 24 hours  Temp  Min: 97.9 °F (36.6 °C)  Max: 98.1 °F (36.7 °C)   BP  Min: 118/75  Max: 156/85   Pulse  Min: 70  Max: 84   Resp  Min: 16  Max: 20   SpO2  Min: 95 %  Max: 98 %   No data recorded   No data recorded     Flowsheet Rows      Flowsheet Row First Filed Value   Admission Height 182.9 cm (72\") Documented at 05/14/2024 0335   Admission Weight 75.3 kg (166 lb) Documented at 05/14/2024 0335            No intake/output data recorded.  I/O last 3 completed shifts:  In: 3156 [P.O.:480; I.V.:2676]  Out: 3600 [Urine:3600]    Physical Exam:    Gen: Alert, NAD   HENT: NC, AT, EOMI   NECK: Supple, no JVD, Trachea midline   LUNGS: CTA bilaterally, non labored respirtation   CVS: S1/S2 audible, RRR, no murmur   Abd: Soft, NT, ND, BS+   Ext: No pedal edema, no cyanosis   CNS: Alert, No focal deficit noted grossly  Psy: Cooperative  Skin: Warm, dry and intact      WBC WBC   Date Value Ref Range Status   05/16/2024 14.28 (H) 3.40 - 10.80 10*3/mm3 Final   05/15/2024 21.06 (H) 3.40 - 10.80 10*3/mm3 Final   05/14/2024 23.43 (H) 3.40 - 10.80 10*3/mm3 Final      HGB Hemoglobin   Date Value Ref Range Status   05/16/2024 8.8 (L) 13.0 - 17.7 g/dL Final   05/15/2024 8.6 (L) 13.0 - 17.7 g/dL Final   05/14/2024 10.3 (L) 13.0 - 17.7 g/dL Final      HCT Hematocrit   Date Value Ref Range Status   05/16/2024 28.1 (L) 37.5 - 51.0 % Final   05/15/2024 27.1 (L) 37.5 - 51.0 % Final   05/14/2024 " "32.3 (L) 37.5 - 51.0 % Final      Platlets No results found for: \"LABPLAT\"   MCV MCV   Date Value Ref Range Status   05/16/2024 95.9 79.0 - 97.0 fL Final   05/15/2024 94.4 79.0 - 97.0 fL Final   05/14/2024 91.8 79.0 - 97.0 fL Final          Sodium Sodium   Date Value Ref Range Status   05/16/2024 137 136 - 145 mmol/L Final   05/15/2024 133 (L) 136 - 145 mmol/L Final   05/14/2024 136 136 - 145 mmol/L Final      Potassium Potassium   Date Value Ref Range Status   05/16/2024 4.2 3.5 - 5.2 mmol/L Final   05/15/2024 4.7 3.5 - 5.2 mmol/L Final   05/14/2024 4.8 3.5 - 5.2 mmol/L Final     Comment:     Slight hemolysis detected by analyzer. Result may be falsely elevated.      Chloride Chloride   Date Value Ref Range Status   05/16/2024 105 98 - 107 mmol/L Final   05/15/2024 102 98 - 107 mmol/L Final   05/14/2024 101 98 - 107 mmol/L Final      CO2 CO2   Date Value Ref Range Status   05/16/2024 19.0 (L) 22.0 - 29.0 mmol/L Final   05/15/2024 18.0 (L) 22.0 - 29.0 mmol/L Final   05/14/2024 20.0 (L) 22.0 - 29.0 mmol/L Final      BUN BUN   Date Value Ref Range Status   05/16/2024 41 (H) 8 - 23 mg/dL Final   05/15/2024 42 (H) 8 - 23 mg/dL Final   05/14/2024 37 (H) 8 - 23 mg/dL Final      Creatinine Creatinine   Date Value Ref Range Status   05/16/2024 5.57 (H) 0.76 - 1.27 mg/dL Final   05/15/2024 5.74 (H) 0.76 - 1.27 mg/dL Final   05/14/2024 5.23 (H) 0.76 - 1.27 mg/dL Final      Calcium Calcium   Date Value Ref Range Status   05/16/2024 8.7 8.6 - 10.5 mg/dL Final   05/15/2024 8.6 8.6 - 10.5 mg/dL Final   05/14/2024 8.6 8.6 - 10.5 mg/dL Final      PO4 No results found for: \"CAPO4\"   Albumin Albumin   Date Value Ref Range Status   05/15/2024 2.9 (L) 3.5 - 5.2 g/dL Final   05/14/2024 3.1 (L) 3.5 - 5.2 g/dL Final      Magnesium No results found for: \"MG\"   Uric Acid No results found for: \"URICACID\"        Results Review:     I reviewed the patient's new clinical results.    budesonide-formoterol, 2 puff, Inhalation, BID - RT   " And  tiotropium bromide monohydrate, 2 puff, Inhalation, Daily - RT  cefepime, 2,000 mg, Intravenous, Q24H  pantoprazole, 40 mg, Oral, Q AM  pravastatin, 80 mg, Oral, Nightly  sodium chloride, 10 mL, Intravenous, Q12H           Medication Review: yes  Results from last 7 days   Lab Units 05/16/24  0430 05/15/24  0418 05/14/24  0431 05/14/24  0339 05/12/24  0402 05/11/24  0421   SODIUM mmol/L 137 133* 136  --  139 138   POTASSIUM mmol/L 4.2 4.7 4.8  --  4.1 3.7   CHLORIDE mmol/L 105 102 101  --  97* 95*   CO2 mmol/L 19.0* 18.0* 20.0*  --  27.0 29.0   BUN mg/dL 41* 42* 37*  --  39* 44*   CREATININE mg/dL 5.57* 5.74* 5.23*  --  5.99* 7.08*   CALCIUM mg/dL 8.7 8.6 8.6  --  8.9 8.6   ALBUMIN g/dL  --  2.9* 3.1*  --  3.4* 3.1*   WBC 10*3/mm3 14.28* 21.06*  --  23.43*  --  11.07*   HEMOGLOBIN g/dL 8.8* 8.6*  --  10.3*  --  9.2*   PLATELETS 10*3/mm3 298 286  --  320  --  216     Results from last 7 days   Lab Units 05/14/24  0616   COLOR UA  Yellow   CLARITY UA  Turbid*   PH, URINE  8.0   SPECIFIC GRAVITY, URINE  1.012   GLUCOSE UA  100 mg/dL (Trace)*   KETONES UA  Negative   BILIRUBIN UA  Negative   PROTEIN UA  >=300 mg/dL (3+)*   BLOOD UA  Small (1+)*   LEUKOCYTES UA  Large (3+)*   NITRITE UA  Negative        Intake/Output Summary (Last 24 hours) at 5/16/2024 1414  Last data filed at 5/16/2024 0500  Gross per 24 hour   Intake 2676 ml   Output 1700 ml   Net 976 ml           Assessment & Plan       Hypotension    Presence of cardiac pacemaker    Centrilobular emphysema    Tobacco abuse    Bronchogenic cancer of right lung    Stage 4 chronic kidney disease    1.  ARACELI on CKD stage IV - Scr 5.57- stable. creatinine peaked around 7.6 to last admission, patient went home with a creatinine slowly coming down to 5 range.  He was found hypotensive at home with nausea not feeling good and came back same day to the hospital.  Mild increase in creatinine is noted at this time.  2.  Proteinuria new finding on the last admission.  3.   S/p sepsis resolved patient on last admission was taken off the pressors and was placed on midodrine.  4.  Hyponatremia  resolved  5.  Volume: Need to drink more fluid.  6.  Metabolic acidosis.   7.  Infectious disease: Patient's followed with ID consultants.  8.  S/p immunotherapy for non-small cell cancer last infusion 4/4/2024 Opdiv[facility].     Recommendation:  - Encourage oral hydration.  - Monitor I/O   - Avoid nephrotoxic agents.   - Renal diet   - Adjust meds per renal function   - No emergent need of RRT   - Monitor H/H and transfuse for Hgb less than 7.0     High risk complex patient multiple medical problems.    Kurt Ríos MD  05/16/24  14:12 EDT

## 2024-05-16 NOTE — PROGRESS NOTES
[facility] Medicine Services  PROGRESS NOTE    Patient Name: David Barfield  : 1950  MRN: [id number]    Date of Admission: 2024  Primary Care Physician: Hayley Castellanos APRN    Subjective   Subjective     CC:  F/U hypotension, N/V    HPI:  Seen this morning. No major complaints. Diarrhea resolved. Nausea significantly improved, ate breakfast. BP stable.       Objective   Objective     Vital Signs:   Temp:  [97.9 °F (36.6 °C)-98.1 °F (36.7 °C)] 98 °F (36.7 °C)  Heart Rate:  [70-84] 71  Resp:  [16-20] 18  BP: (124-156)/(72-85) 140/78     Physical Exam:  Gen-no acute distress, chronically ill appearing  HENT-NCAT, mucous membranes moist  CV-RRR, S1 S2 normal, no m/r/g  Resp-CTAB, no wheezes or rales  Abd-soft, NT, ND, +BS  Neuro-A&Ox3, no focal deficits  Psych-appropriate mood        Results Reviewed:  LAB RESULTS:      Lab 24  0430 05/15/24  0418 24  0431 24  0339 24  0421 05/10/24  0324   WBC 14.28* 21.06*  --  23.43* 11.07* 10.73   HEMOGLOBIN 8.8* 8.6*  --  10.3* 9.2* 8.7*   HEMATOCRIT 28.1* 27.1*  --  32.3* 27.8* 25.9*   PLATELETS 298 286  --  320 216 192   NEUTROS ABS  --  17.68*  --  20.24* 7.64* 7.90*   IMMATURE GRANS (ABS)  --  0.20*  --  0.40* 0.09* 0.04   LYMPHS ABS  --  1.34  --  0.73 1.41 1.12   MONOS ABS  --  1.54*  --  1.91* 1.70* 1.45*   EOS ABS  --  0.20  --  0.08 0.17 0.17   MCV 95.9 94.4  --  91.8 88.3 87.2   SED RATE  --   --   --  98*  --   --    CRP  --   --  3.78*  --   --   --    PROCALCITONIN  --   --  1.81*  --   --   --    LACTATE  --   --   --  1.9  --   --          Lab 24  0430 05/15/24  0418 05/14/24  0431 05/12/24  0402 05/11/24  0421 05/10/24  0324   SODIUM 137 133* 136 139 138 133*   POTASSIUM 4.2 4.7 4.8 4.1 3.7 4.2   CHLORIDE 105 102 101 97* 95* 92*   CO2 19.0* 18.0* 20.0* 27.0 29.0 24.0   ANION GAP 13.0 13.0 15.0 15.0 14.0 17.0*   BUN 41* 42* 37* 39* 44* 49*   CREATININE 5.57* 5.74* 5.23* 5.99* 7.08* 7.64*    EGFR 10.0* 9.6* 10.8* 9.2* 7.5* 6.8*   GLUCOSE 92 93 113* 99 116* 108*   CALCIUM 8.7 8.6 8.6 8.9 8.6 8.4*   PHOSPHORUS  --  4.5  --  5.1* 5.1* 5.9*         Lab 05/15/24  0418 05/14/24  0431 05/12/24  0402 05/11/24  0421 05/10/24  0324   TOTAL PROTEIN  --  6.8  --   --   --    ALBUMIN 2.9* 3.1* 3.4* 3.1* 2.7*   GLOBULIN  --  3.7  --   --   --    ALT (SGPT)  --  10  --   --   --    AST (SGOT)  --  17  --   --   --    BILIRUBIN  --  0.4  --   --   --    ALK PHOS  --  74  --   --   --    LIPASE  --  17  --   --   --          Lab 05/14/24 0431   PROBNP 857.5   HSTROP T 34*                 Brief Urine Lab Results  (Last result in the past 365 days)        Color   Clarity   Blood   Leuk Est   Nitrite   Protein   CREAT   Urine HCG        05/14/24 0616 Yellow   Turbid   Small (1+)   Large (3+)   Negative   >=300 mg/dL (3+)                   Microbiology Results Abnormal       Procedure Component Value - Date/Time    Blood Culture - Blood, Arm, Right [[id number]]  (Normal) Collected: 05/14/24 0412    Lab Status: Preliminary result Specimen: Blood from Arm, Right Updated: 05/16/24 0445     Blood Culture No growth at 2 days    Blood Culture - Blood, Arm, Left [[id number]]  (Normal) Collected: 05/14/24 0439    Lab Status: Preliminary result Specimen: Blood from Arm, Left Updated: 05/16/24 0445     Blood Culture No growth at 2 days    Urine Culture - Urine, Urine, Catheter [[id number]]  (Normal) Collected: 05/14/24 0616    Lab Status: Final result Specimen: Urine, Catheter Updated: 05/15/24 1120     Urine Culture No growth    Respiratory Panel PCR w/COVID-19(SARS-CoV-2) [facility]/CYNTHIA/DENA/PAD/COR/JEROME In-House, NP Swab in UTM/VTM, 2 HR TAT - Swab, Nasopharynx [[id number]]  (Normal) Collected: 05/14/24 0808    Lab Status: Final result Specimen: Swab from Nasopharynx Updated: 05/14/24 0925     ADENOVIRUS, PCR Not Detected     Coronavirus 229E Not Detected     Coronavirus HKU1 Not Detected     Coronavirus NL63 Not Detected     Coronavirus  OC43 Not Detected     COVID19 Not Detected     Human Metapneumovirus Not Detected     Human Rhinovirus/Enterovirus Not Detected     Influenza A PCR Not Detected     Influenza B PCR Not Detected     Parainfluenza Virus 1 Not Detected     Parainfluenza Virus 2 Not Detected     Parainfluenza Virus 3 Not Detected     Parainfluenza Virus 4 Not Detected     RSV, PCR Not Detected     Bordetella pertussis pcr Not Detected     Bordetella parapertussis PCR Not Detected     Chlamydophila pneumoniae PCR Not Detected     Mycoplasma pneumo by PCR Not Detected    Narrative:      In the setting of a positive respiratory panel with a viral infection PLUS a negative procalcitonin without other underlying concern for bacterial infection, consider observing off antibiotics or discontinuation of antibiotics and continue supportive care. If the respiratory panel is positive for atypical bacterial infection (Bordetella pertussis, Chlamydophila pneumoniae, or Mycoplasma pneumoniae), consider antibiotic de-escalation to target atypical bacterial infection.    COVID-19, FLU A/B, RSV PCR 1 HR TAT - Swab, Nasopharynx [[id number]]  (Normal) Collected: 05/14/24 0416    Lab Status: Final result Specimen: Swab from Nasopharynx Updated: 05/14/24 0513     COVID19 Not Detected     Influenza A PCR Not Detected     Influenza B PCR Not Detected     RSV, PCR Not Detected    Narrative:      Fact sheet for providers: https://www.fda.gov/media/888394/download    Fact sheet for patients: https://www.fda.gov/media/384828/download    Test performed by PCR.            No radiology results from the last 24 hrs    Results for orders placed during the hospital encounter of 05/05/24    Adult Transesophageal Echo 3D (DAMIÁN) W/ Cont If Necessary Per Protocol    Interpretation Summary    Left ventricular systolic function is normal. Left ventricular ejection fraction appears to be 61 - 65%. Normal left ventricular cavity size noted. Left ventricular wall thickness is  consistent with mild concentric hypertrophy. All left ventricular wall segments contract normally.    There is mild calcification of the aortic valve. The aortic valve appears trileaflet. Mild aortic valve regurgitation is present. No aortic valve stenosis is present. There is no evidence of an aortic valve mass is present.    There is mild calcification of the mitral valve. There is mild, bileaflet mitral valve thickening present. No evidence of a mitral valve mass is present. Mild to moderate mitral valve regurgitation is present. No significant mitral valve stenosis is present.    The tricuspid valve is structurally normal with no significant stenosis present. There is no evidence of a mass on the tricuspid valve. Trace tricuspid valve regurgitation is present.    The pulmonic valve is grossly normal in structure. There is trace pulmonic valve regurgitation present.    No vegetation seen on atrial aspect of pacemaker leads.  The most distal aspects of the RV lead were not completely visualized however there was no apparent vegetation in the more proximal segment, adjacent to the tricuspid valve which appears to be functioning normally.      Current medications:  Scheduled Meds:budesonide-formoterol, 2 puff, Inhalation, BID - RT   And  tiotropium bromide monohydrate, 2 puff, Inhalation, Daily - RT  cefepime, 2,000 mg, Intravenous, Q24H  pantoprazole, 40 mg, Oral, Q AM  pravastatin, 80 mg, Oral, Nightly  sodium chloride, 10 mL, Intravenous, Q12H      Continuous Infusions:     PRN Meds:.  acetaminophen **OR** acetaminophen **OR** acetaminophen    Albuterol Sulfate NEB Orderable    senna-docusate sodium **AND** polyethylene glycol **AND** bisacodyl **AND** bisacodyl    loperamide    ondansetron ODT **OR** ondansetron    sodium chloride    sodium chloride    sodium chloride    Assessment & Plan   Assessment & Plan     Active Hospital Problems    Diagnosis  POA    **Hypotension [I95.9]  Yes    Stage 4 chronic kidney  disease [N18.4]  Unknown    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        Brief Hospital Course to date:  David Barfield is a 74 y.o. male with hx of NSCLC s/p neoadjuvant chemo, RLL lobectomy 1/2024, subsequent immunotherapy with Opdivo (last dose ~4/4/24), CAD, HTN, AV block s/p PPM, hypotension, emphysema, tobacco use, CKD 4-5, with 3 admissions since last month. Recent admissions have been for sepsis of unknown source, s/p multiple courses of ATBx, all culture data has been negative including Karius test by ID. Imaging has been positive for nonspecific bilateral perinephric stranding only with negative urine cultures. He was planned for IV Cefepime through 6/3/24 (discharged from [facility] 5/12/24). Received his dose in the clinic on 5/13/24, later that evening developed severe nausea with dry heaving. Next morning his BP was in the 80's at home, EMS called. BP 99/87 on arrival to [facility] ED, did get as low 75/56. Improved with IV fluids. Admitted for further management.    This patient's problems and plans were partially entered by my partner and updated as appropriate by me 05/16/24. Copied text in this note has been reviewed and is accurate as of today's date.     Nausea and vomiting, resolved  Hypotension, fluid responsive  --Recent workup included (but not limited to), TTE (normal EF without significant valvular disease), appropriate cortisol, normal TSH  --Had PRN midodrine up until discharge 4/12/24; also was restarted on home Amlodipine at DC  --Hold Amlodipine for now, may decrease dose to 2.5 mg and use only AS NEEDED for elevated BP given his recurrent issues with hypotension  --Remote problem list from Cardiology ~2021 included chronic hypotension? Data deficit; last Cardiology office note 7/5/23 stated he was on PRN  Lisinopril 2.5 mg for SBP >140   --Stopped IV fluids this morning - monitor BP off fluids  --Add PRN midodrine if needed - significant other requesting prescription at discharge  --Supportive care with antiemetics, advance diet as tolerated  --GI symptoms resolved     Recurrent treatment for presumed sepsis  --Has had nonspecific perinephric stranding on recent CT A/P which could potentially be caused by Opdivo (per UpToDate, nephritis can occur, as well as elevation in serum Cr without renal impairment, typical onset 12-48 weeks after initiation but as early as 3 weeks)  --All previous culture data has been negative, no vegetations on DAMIÁN; has followed with ID/Dr. Derian Barry  --Unclear if true sepsis or alternative process; per prior notes he does appear to improve with ATBx then deteriorate after discharge  --Continue previous planned course of IV Cefepime through 6/3/24  --Repeat urine and blood cultures are negative - UA did show 1+ bacteria this time, however culture was negative     CKD stage 4 with elevated Cr  --Baseline Cr 2.2-3.3; eGFR 20's  --Cr significantly elevated from baseline but actually better than at discharge (had peaked at 7.64 last admission, was 5.99 on day of discharge 5/12/24)  --Has seen Nephrology during previous admissions, patient had good urine output, some proteinuria noted last admission; felt no indication for dialysis, possibly related to immunotherapy?  --Significant other requested Nephrology consultation - no further recs from their standpoint other than continued monitoring and hydration  --Cr currently appears stable ~5.5, suspect will continue to fluctuate     NSCLC  --s/p neoadjuvant chemo, RLL lobectomy 1/2024, immunotherapy (last dose Opdivo 4/4/24)  --Follows with Dr. Ashley, further treatment on hold due to renal and infectious issues - will need to reschedule follow up appointment with her as he missed the 5/8/24 appointment due to hospitalization (she did see him  in the hospital however)     CAD  AV block s/p PPM  Hx HTN  --Continue statin, holding Amlodipine due to hypotension    Chronic anemia  --Monitor, stable overall     Emphysema  Tobacco abuse  --Trelegy substitute   --PRN nebs     Impaired glucose tolerance  --Last HbA1c 6.3%    Expected Discharge Location and Transportation: home  Expected Discharge home tomorrow if BP stable  Expected Discharge Date: 5/17/2024; Expected Discharge Time:      DVT prophylaxis:  Mechanical DVT prophylaxis orders are present.         AM-PAC 6 Clicks Score (PT): 24 (05/15/24 1026)    CODE STATUS:   Code Status and Medical Interventions:   Ordered at: 05/14/24 0751     Code Status (Patient has no pulse and is not breathing):    CPR (Attempt to Resuscitate)     Medical Interventions (Patient has pulse or is breathing):    Full Support       Yamile Hays MD  05/16/24

## 2024-05-17 ENCOUNTER — READMISSION MANAGEMENT (OUTPATIENT)
Dept: CALL CENTER | Facility: HOSPITAL | Age: 74
End: 2024-05-17
Payer: MEDICARE

## 2024-05-17 VITALS
TEMPERATURE: 97.9 F | BODY MASS INDEX: 23.3 KG/M2 | HEART RATE: 75 BPM | DIASTOLIC BLOOD PRESSURE: 70 MMHG | HEIGHT: 72 IN | RESPIRATION RATE: 16 BRPM | SYSTOLIC BLOOD PRESSURE: 127 MMHG | OXYGEN SATURATION: 97 % | WEIGHT: 172 LBS

## 2024-05-17 PROBLEM — E44.0 MODERATE MALNUTRITION: Status: ACTIVE | Noted: 2024-05-17

## 2024-05-17 LAB
ANION GAP SERPL CALCULATED.3IONS-SCNC: 13 MMOL/L (ref 5–15)
BUN SERPL-MCNC: 35 MG/DL (ref 8–23)
BUN/CREAT SERPL: 6.2 (ref 7–25)
CALCIUM SPEC-SCNC: 8.5 MG/DL (ref 8.6–10.5)
CHLORIDE SERPL-SCNC: 106 MMOL/L (ref 98–107)
CO2 SERPL-SCNC: 18 MMOL/L (ref 22–29)
CREAT SERPL-MCNC: 5.67 MG/DL (ref 0.76–1.27)
DEPRECATED RDW RBC AUTO: 59.6 FL (ref 37–54)
EGFRCR SERPLBLD CKD-EPI 2021: 9.8 ML/MIN/1.73
ERYTHROCYTE [DISTWIDTH] IN BLOOD BY AUTOMATED COUNT: 17 % (ref 12.3–15.4)
GLUCOSE SERPL-MCNC: 95 MG/DL (ref 65–99)
HCT VFR BLD AUTO: 30.4 % (ref 37.5–51)
HGB BLD-MCNC: 9.3 G/DL (ref 13–17.7)
MCH RBC QN AUTO: 29.3 PG (ref 26.6–33)
MCHC RBC AUTO-ENTMCNC: 30.6 G/DL (ref 31.5–35.7)
MCV RBC AUTO: 95.9 FL (ref 79–97)
PLATELET # BLD AUTO: 306 10*3/MM3 (ref 140–450)
PMV BLD AUTO: 8.5 FL (ref 6–12)
POTASSIUM SERPL-SCNC: 4.5 MMOL/L (ref 3.5–5.2)
RBC # BLD AUTO: 3.17 10*6/MM3 (ref 4.14–5.8)
SODIUM SERPL-SCNC: 137 MMOL/L (ref 136–145)
WBC NRBC COR # BLD AUTO: 15.02 10*3/MM3 (ref 3.4–10.8)

## 2024-05-17 PROCEDURE — 83516 IMMUNOASSAY NONANTIBODY: CPT | Performed by: INTERNAL MEDICINE

## 2024-05-17 PROCEDURE — 94799 UNLISTED PULMONARY SVC/PX: CPT

## 2024-05-17 PROCEDURE — 99239 HOSP IP/OBS DSCHRG MGMT >30: CPT | Performed by: INTERNAL MEDICINE

## 2024-05-17 PROCEDURE — 86037 ANCA TITER EACH ANTIBODY: CPT | Performed by: INTERNAL MEDICINE

## 2024-05-17 PROCEDURE — 80048 BASIC METABOLIC PNL TOTAL CA: CPT | Performed by: HOSPITALIST

## 2024-05-17 PROCEDURE — 94664 DEMO&/EVAL PT USE INHALER: CPT

## 2024-05-17 PROCEDURE — 25010000002 CEFEPIME PER 500 MG: Performed by: INTERNAL MEDICINE

## 2024-05-17 PROCEDURE — 85027 COMPLETE CBC AUTOMATED: CPT | Performed by: HOSPITALIST

## 2024-05-17 RX ORDER — AMLODIPINE BESYLATE 5 MG/1
5 TABLET ORAL
Qty: 30 TABLET | Refills: 0 | Status: SHIPPED | OUTPATIENT
Start: 2024-05-17 | End: 2024-05-23 | Stop reason: HOSPADM

## 2024-05-17 RX ORDER — MIDODRINE HYDROCHLORIDE 10 MG/1
5 TABLET ORAL 3 TIMES DAILY PRN
Qty: 8 TABLET | Refills: 0 | Status: SHIPPED | OUTPATIENT
Start: 2024-05-17 | End: 2024-05-19

## 2024-05-17 RX ADMIN — BUDESONIDE AND FORMOTEROL FUMARATE DIHYDRATE 2 PUFF: 160; 4.5 AEROSOL RESPIRATORY (INHALATION) at 10:02

## 2024-05-17 RX ADMIN — Medication 10 ML: at 09:36

## 2024-05-17 RX ADMIN — PANTOPRAZOLE SODIUM 40 MG: 40 TABLET, DELAYED RELEASE ORAL at 05:13

## 2024-05-17 RX ADMIN — TIOTROPIUM BROMIDE INHALATION SPRAY 2 PUFF: 3.12 SPRAY, METERED RESPIRATORY (INHALATION) at 10:02

## 2024-05-17 RX ADMIN — CEFEPIME 2000 MG: 2 INJECTION, POWDER, FOR SOLUTION INTRAVENOUS at 09:36

## 2024-05-17 NOTE — PLAN OF CARE
Goal Outcome Evaluation:  Plan of Care Reviewed With: patient        Progress: no change  Outcome Evaluation: Pt is A&O with VSS, NSR or A-paced at times on the monitor, and on RA. BP has remained stable since off the maintenance fluids. Pt slept well through the night. There are no complaints at this time.

## 2024-05-17 NOTE — CASE MANAGEMENT/SOCIAL WORK
Case Management Discharge Note      Final Note: CM had discussion with Pt and family at bedside. Although nephrology is recommending Pt stay another night to monitor renal function, Pt has chosen to go home today. Pt is on room air. Family will provide transportation via private vehicle. No discharge needs identified.         Selected Continued Care - Admitted Since 5/14/2024       Destination    No services have been selected for the patient.                Durable Medical Equipment    No services have been selected for the patient.                Dialysis/Infusion    No services have been selected for the patient.                Home Medical Care    No services have been selected for the patient.                Therapy    No services have been selected for the patient.                Community Resources    No services have been selected for the patient.                Community & DME    No services have been selected for the patient.                    Transportation Services  Private: Car    Final Discharge Disposition Code: 01 - home or self-care

## 2024-05-17 NOTE — OUTREACH NOTE
Prep Survey      Flowsheet Row Responses   [facility] [place] patient discharged from? [facility]   Is LACE score < 7 ? No   Eligibility [facility]   Date of Admission 05/14/24   Date of Discharge 05/17/24   Discharge Disposition Home or Self Care   Discharge diagnosis Hypotension   Does the patient have one of the following disease processes/diagnoses(primary or secondary)? Sepsis   Does the patient have Home health ordered? No   Is there a DME ordered? No   Medication alerts for this patient see AVS   Prep survey completed? Yes            Aimee MEYERS - Registered Nurse

## 2024-05-17 NOTE — PROGRESS NOTES
"   LOS: 2 days    Patient Care Team:  Hayley Castellanos APRN as PCP - General (Nurse Practitioner)  Octaviano Sampson MD as Consulting Physician (Pulmonary Disease)  Neetu Ashley MD as Referring Physician (Hematology and Oncology)  Nimo Rodríguez MD as Consulting Physician (Radiation Oncology)    Chief Complaint:  Weakness    Subjective     Interval History:     No new events no added distress.      Review of Systems:   Patient denies shortness of breath, chest pain, dysuria, hematuria, nausea, vomiting.        Objective     Vital Sign Min/Max for last 24 hours  Temp  Min: 97.4 °F (36.3 °C)  Max: 98.7 °F (37.1 °C)   BP  Min: 118/78  Max: 148/73   Pulse  Min: 70  Max: 78   Resp  Min: 16  Max: 20   SpO2  Min: 94 %  Max: 100 %   No data recorded   No data recorded     Flowsheet Rows      Flowsheet Row First Filed Value   Admission Height 182.9 cm (72\") Documented at 05/14/2024 0335   Admission Weight 75.3 kg (166 lb) Documented at 05/14/2024 0335            I/O this shift:  In: 360 [P.O.:360]  Out: 500 [Urine:500]  I/O last 3 completed shifts:  In: 2676 [I.V.:2676]  Out: 4425 [Urine:4425]    Physical Exam:    General Appearance: Alert, oriented, no obvious distress.  Eyes: PER, EOMI.  Neck: Supple no JVD.  Lungs: Clear auscultation, no rales rhonchi's, equal chest movement, nonlabored.  Heart: No gallop, murmur, rub, RRR.  Abdomen: Soft, nontender, positive bowel sounds, no organomegaly.  Extremities: No ower extremity edema, no cyanosis.  Neuro: No focal deficit, moving all extremities, alert oriented X 3   no suprapubic fullness or tenderness.        WBC WBC   Date Value Ref Range Status   05/17/2024 15.02 (H) 3.40 - 10.80 10*3/mm3 Final   05/16/2024 14.28 (H) 3.40 - 10.80 10*3/mm3 Final   05/15/2024 21.06 (H) 3.40 - 10.80 10*3/mm3 Final      HGB Hemoglobin   Date Value Ref Range Status   05/17/2024 9.3 (L) 13.0 - 17.7 g/dL Final   05/16/2024 8.8 (L) 13.0 - 17.7 g/dL Final   05/15/2024 8.6 (L) 13.0 - 17.7 " "g/dL Final      HCT Hematocrit   Date Value Ref Range Status   05/17/2024 30.4 (L) 37.5 - 51.0 % Final   05/16/2024 28.1 (L) 37.5 - 51.0 % Final   05/15/2024 27.1 (L) 37.5 - 51.0 % Final      Platlets No results found for: \"LABPLAT\"   MCV MCV   Date Value Ref Range Status   05/17/2024 95.9 79.0 - 97.0 fL Final   05/16/2024 95.9 79.0 - 97.0 fL Final   05/15/2024 94.4 79.0 - 97.0 fL Final          Sodium Sodium   Date Value Ref Range Status   05/17/2024 137 136 - 145 mmol/L Final   05/16/2024 137 136 - 145 mmol/L Final   05/15/2024 133 (L) 136 - 145 mmol/L Final      Potassium Potassium   Date Value Ref Range Status   05/17/2024 4.5 3.5 - 5.2 mmol/L Final   05/16/2024 4.2 3.5 - 5.2 mmol/L Final   05/15/2024 4.7 3.5 - 5.2 mmol/L Final      Chloride Chloride   Date Value Ref Range Status   05/17/2024 106 98 - 107 mmol/L Final   05/16/2024 105 98 - 107 mmol/L Final   05/15/2024 102 98 - 107 mmol/L Final      CO2 CO2   Date Value Ref Range Status   05/17/2024 18.0 (L) 22.0 - 29.0 mmol/L Final   05/16/2024 19.0 (L) 22.0 - 29.0 mmol/L Final   05/15/2024 18.0 (L) 22.0 - 29.0 mmol/L Final      BUN BUN   Date Value Ref Range Status   05/17/2024 35 (H) 8 - 23 mg/dL Final   05/16/2024 41 (H) 8 - 23 mg/dL Final   05/15/2024 42 (H) 8 - 23 mg/dL Final      Creatinine Creatinine   Date Value Ref Range Status   05/17/2024 5.67 (H) 0.76 - 1.27 mg/dL Final   05/16/2024 5.57 (H) 0.76 - 1.27 mg/dL Final   05/15/2024 5.74 (H) 0.76 - 1.27 mg/dL Final      Calcium Calcium   Date Value Ref Range Status   05/17/2024 8.5 (L) 8.6 - 10.5 mg/dL Final   05/16/2024 8.7 8.6 - 10.5 mg/dL Final   05/15/2024 8.6 8.6 - 10.5 mg/dL Final      PO4 No results found for: \"CAPO4\"   Albumin Albumin   Date Value Ref Range Status   05/15/2024 2.9 (L) 3.5 - 5.2 g/dL Final      Magnesium No results found for: \"MG\"   Uric Acid No results found for: \"URICACID\"        Results Review:     I reviewed the patient's new clinical results.    budesonide-formoterol, 2 puff, " Inhalation, BID - RT   And  tiotropium bromide monohydrate, 2 puff, Inhalation, Daily - RT  cefepime, 2,000 mg, Intravenous, Q24H  pantoprazole, 40 mg, Oral, Q AM  pravastatin, 80 mg, Oral, Nightly  sodium chloride, 10 mL, Intravenous, Q12H           Medication Review: yes  Results from last 7 days   Lab Units 05/17/24  0529 05/16/24  0430 05/15/24  0418 05/14/24  0431 05/14/24  0339 05/12/24  0402 05/11/24  0421   SODIUM mmol/L 137 137 133* 136  --  139 138   POTASSIUM mmol/L 4.5 4.2 4.7 4.8  --  4.1 3.7   CHLORIDE mmol/L 106 105 102 101  --  97* 95*   CO2 mmol/L 18.0* 19.0* 18.0* 20.0*  --  27.0 29.0   BUN mg/dL 35* 41* 42* 37*  --  39* 44*   CREATININE mg/dL 5.67* 5.57* 5.74* 5.23*  --  5.99* 7.08*   CALCIUM mg/dL 8.5* 8.7 8.6 8.6  --  8.9 8.6   ALBUMIN g/dL  --   --  2.9* 3.1*  --  3.4* 3.1*   WBC 10*3/mm3 15.02* 14.28* 21.06*  --  23.43*  --  11.07*   HEMOGLOBIN g/dL 9.3* 8.8* 8.6*  --  10.3*  --  9.2*   PLATELETS 10*3/mm3 306 298 286  --  320  --  216     Results from last 7 days   Lab Units 05/14/24  0616   COLOR UA  Yellow   CLARITY UA  Turbid*   PH, URINE  8.0   SPECIFIC GRAVITY, URINE  1.012   GLUCOSE UA  100 mg/dL (Trace)*   KETONES UA  Negative   BILIRUBIN UA  Negative   PROTEIN UA  >=300 mg/dL (3+)*   BLOOD UA  Small (1+)*   LEUKOCYTES UA  Large (3+)*   NITRITE UA  Negative        Intake/Output Summary (Last 24 hours) at 5/17/2024 1326  Last data filed at 5/17/2024 1000  Gross per 24 hour   Intake 360 ml   Output 3225 ml   Net -2865 ml           Assessment & Plan       Hypotension    Presence of cardiac pacemaker    Centrilobular emphysema    Tobacco abuse    Bronchogenic cancer of right lung    Stage 4 chronic kidney disease    Moderate malnutrition    1.  ARACELI on CKD stage IV - Scr 5.57- stable. creatinine peaked around 7.6 to last admission, patient went home with a creatinine slowly coming down to 5 range.  He was found hypotensive at home with nausea not feeling good and came back same day to the  hospital.  Mild increase in creatinine is noted at this time.  2.  Proteinuria new finding on the last admission.  3.  S/p sepsis resolved patient on last admission was taken off the pressors and was placed on midodrine.  4.  Hyponatremia  resolved  5.  Volume: Need to drink more fluid.  6.  Metabolic acidosis.   7.  Infectious disease: Patient's followed with ID consultants.  8.  S/p immunotherapy for non-small cell cancer last infusion 4/4/2024 Opdivo.     Recommendation:  -Encourage oral hydration.     - Monitor I/O   -Avoid nephrotoxic medications  - Renal diet   - Adjust meds per renal function   -GFR is 10 but no emergent RRT is needed  - Monitor H/H and transfuse for Hgb less than 7.0     High risk complex patient multiple medical problems.  Discussed with the patient and the wife regards to importance of staying 1 more day to see what the renal function is doing.  Alok Dixon MD  05/17/24  13:26 EDT

## 2024-05-17 NOTE — DISCHARGE SUMMARY
[facility] Medicine Services  DISCHARGE SUMMARY    Patient Name: David Barfield  : 1950  MRN: [id number]    Date of Admission: 2024  3:33 AM  Date of Discharge:  24  Primary Care Physician: Hayley Castellanos APRN    Consults       Date and Time Order Name Status Description    5/15/2024 11:48 AM Inpatient Nephrology Consult Completed     2024 12:10 PM Inpatient Nephrology Consult Completed     2024  3:25 AM Inpatient Infectious Diseases Consult Completed     2024  2:19 PM Inpatient Hematology & Oncology Consult Completed     2024  9:52 AM Inpatient Nephrology Consult Completed     2024  3:19 PM Inpatient Infectious Diseases Consult Completed     2024  2:55 AM Inpatient Infectious Diseases Consult Completed             Hospital Course     Presenting Problem: hypotension    Active Hospital Problems    Diagnosis  POA    **Hypotension [I95.9]  Yes    Moderate malnutrition [E44.0]  Yes    Stage 4 chronic kidney disease [N18.4]  Yes    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          Hospital Course:  David Barfield is a 74 y.o. male with hx of NSCLC s/p neoadjuvant chemo, RLL lobectomy 2024, subsequent immunotherapy with Opdivo (last dose ~24), CAD, HTN, AV block s/p PPM, hypotension, emphysema, tobacco use, CKD 4-5, with 3 admissions since last month. Recent admissions have been for sepsis of unknown source, s/p multiple courses of ATBx, all culture data has been negative including Karius test by ID. Imaging has been positive for nonspecific bilateral perinephric stranding only with negative urine cultures. He was planned for IV Cefepime through 6/3/24 (discharged from [facility] 24). Received his dose in the clinic on 24, later that evening  developed severe nausea with dry heaving. Next morning his BP was in the 80's at home, EMS called. BP 99/87 on arrival to [facility] ED, did get as low 75/56. Improved with IV fluids. Admitted for further management.     Hypotension, fluid responsive, resolved  -Unclear etiology, responded to fluids  -Recent workup included (but not limited to), TTE (normal EF without significant valvular disease), appropriate cortisol, normal TSH  -Was taking amlodipine at home. Will hold at discharge and take only PRN for SBP greater then 140.   -Provided prescription for PRN midodrine per family request     Nausea and vomiting, resolved  - Unclear etiology   --GI symptoms resolved     Recurrent treatment for presumed sepsis  --Has had nonspecific perinephric stranding on recent CT A/P which could potentially be caused by Opdivo (per UpToDate, nephritis can occur, as well as elevation in serum Cr without renal impairment, typical onset 12-48 weeks after initiation but as early as 3 weeks)  --All previous culture data has been negative, no vegetations on DAMIÁN  --Unclear if true sepsis or alternative process; per prior notes he does appear to improve with ATBx then deteriorate after discharge  --Continue previous planned course of IV Cefepime through 6/3/24  --Repeat urine and blood cultures are negative   -Discussed with Dr. Barry prior to discharge. Patient will continue daily infusions in clinic and follow-up with him as scheduled on 5/20.      CKD stage 4 with elevated Cr  --Baseline Cr 2.2-3.3; eGFR 20's  --Cr significantly elevated from baseline but actually better than at discharge (had peaked at 7.64 last admission, was 5.99 on day of discharge 5/12/24)  --Has seen Nephrology during previous admissions, patient had good urine output, some proteinuria noted last admission; felt no indication for dialysis, possibly related to immunotherapy?  --Significant other requested Nephrology consultation - no further recs from their  standpoint other than continued monitoring and hydration  --Cr currently appears stable at discharge.   -ANCA is pending.      NSCLC  --s/p neoadjuvant chemo, RLL lobectomy 1/2024, immunotherapy (last dose Opdivo 4/4/24)  --Follows with Dr. Ashley, further treatment on hold due to renal and infectious issues      CAD  AV block s/p PPM  Hx HTN  --Continue statin, holding Amlodipine due to hypotension     Chronic anemia  --Monitor, stable overall     Emphysema  Tobacco abuse  --Trelegy substitute   --PRN nebs     Impaired glucose tolerance  --Last HbA1c 6.3%        Discharge Follow Up Recommendations for outpatient labs/diagnostics:  -follow-up with Dr. Barry as scheduled on 5/20  -Daily antibiotic infusions at [facility]      Day of Discharge     HPI:   Patient is doing well this morning. Has no complaints. No further hypotension. Wants to get out of the hospital.       Vital Signs:          Physical Exam:  Constitutional: No acute distress, awake, alert  Respiratory: Clear to auscultation bilaterally, respiratory effort normal   Cardiovascular: RRR, no murmurs, rubs, or gallops  Gastrointestinal: Positive bowel sounds, soft, nontender, nondistended  Musculoskeletal: No bilateral ankle edema  Psychiatric: Appropriate affect, cooperative  Neurologic: Oriented x 3, strength symmetric in all extremities, no focal neurological deficits      Pertinent  and/or Most Recent Results     LAB RESULTS:      Lab 05/17/24  0529 05/16/24  0430 05/15/24  0418 05/14/24  0431 05/14/24  0339   WBC 15.02* 14.28* 21.06*  --  23.43*   HEMOGLOBIN 9.3* 8.8* 8.6*  --  10.3*   HEMATOCRIT 30.4* 28.1* 27.1*  --  32.3*   PLATELETS 306 298 286  --  320   NEUTROS ABS  --   --  17.68*  --  20.24*   IMMATURE GRANS (ABS)  --   --  0.20*  --  0.40*   LYMPHS ABS  --   --  1.34  --  0.73   MONOS ABS  --   --  1.54*  --  1.91*   EOS ABS  --   --  0.20  --  0.08   MCV 95.9 95.9 94.4  --  91.8   SED RATE  --   --   --   --  98*   CRP  --   --   --  3.78*   --    PROCALCITONIN  --   --   --  1.81*  --    LACTATE  --   --   --   --  1.9         Lab 05/17/24  0529 05/16/24  0430 05/15/24  0418 05/14/24  0431 05/12/24  0402   SODIUM 137 137 133* 136 139   POTASSIUM 4.5 4.2 4.7 4.8 4.1   CHLORIDE 106 105 102 101 97*   CO2 18.0* 19.0* 18.0* 20.0* 27.0   ANION GAP 13.0 13.0 13.0 15.0 15.0   BUN 35* 41* 42* 37* 39*   CREATININE 5.67* 5.57* 5.74* 5.23* 5.99*   EGFR 9.8* 10.0* 9.6* 10.8* 9.2*   GLUCOSE 95 92 93 113* 99   CALCIUM 8.5* 8.7 8.6 8.6 8.9   PHOSPHORUS  --   --  4.5  --  5.1*         Lab 05/15/24  0418 05/14/24  0431 05/12/24  0402   TOTAL PROTEIN  --  6.8  --    ALBUMIN 2.9* 3.1* 3.4*   GLOBULIN  --  3.7  --    ALT (SGPT)  --  10  --    AST (SGOT)  --  17  --    BILIRUBIN  --  0.4  --    ALK PHOS  --  74  --    LIPASE  --  17  --          Lab 05/14/24  0431   PROBNP 857.5   HSTROP T 34*                 Brief Urine Lab Results  (Last result in the past 365 days)        Color   Clarity   Blood   Leuk Est   Nitrite   Protein   CREAT   Urine HCG        05/14/24 0616 Yellow   Turbid   Small (1+)   Large (3+)   Negative   >=300 mg/dL (3+)                 Microbiology Results (last 10 days)       Procedure Component Value - Date/Time    Respiratory Panel PCR w/COVID-19(SARS-CoV-2) [facility] In-House, NP Swab in [facility], 2 HR TAT - Swab, Nasopharynx [[id number]]  (Normal) Collected: 05/14/24 0808    Lab Status: Final result Specimen: Swab from Nasopharynx Updated: 05/14/24 0925     ADENOVIRUS, PCR Not Detected     Coronavirus 229E Not Detected     Coronavirus HKU1 Not Detected     Coronavirus NL63 Not Detected     Coronavirus OC43 Not Detected     COVID19 Not Detected     Human Metapneumovirus Not Detected     Human Rhinovirus/Enterovirus Not Detected     Influenza A PCR Not Detected     Influenza B PCR Not Detected     Parainfluenza Virus 1 Not Detected     Parainfluenza Virus 2 Not Detected     Parainfluenza Virus 3 Not Detected     Parainfluenza Virus 4 Not  Detected     RSV, PCR Not Detected     Bordetella pertussis pcr Not Detected     Bordetella parapertussis PCR Not Detected     Chlamydophila pneumoniae PCR Not Detected     Mycoplasma pneumo by PCR Not Detected    Narrative:      In the setting of a positive respiratory panel with a viral infection PLUS a negative procalcitonin without other underlying concern for bacterial infection, consider observing off antibiotics or discontinuation of antibiotics and continue supportive care. If the respiratory panel is positive for atypical bacterial infection (Bordetella pertussis, Chlamydophila pneumoniae, or Mycoplasma pneumoniae), consider antibiotic de-escalation to target atypical bacterial infection.    Urine Culture - Urine, Urine, Catheter [[id number]]  (Normal) Collected: 05/14/24 0616    Lab Status: Final result Specimen: Urine, Catheter Updated: 05/15/24 1120     Urine Culture No growth    Blood Culture - Blood, Arm, Left [[id number]]  (Normal) Collected: 05/14/24 0439    Lab Status: Preliminary result Specimen: Blood from Arm, Left Updated: 05/18/24 0445     Blood Culture No growth at 4 days    COVID-19, FLU A/B, RSV PCR 1 HR TAT - Swab, Nasopharynx [[id number]]  (Normal) Collected: 05/14/24 0416    Lab Status: Final result Specimen: Swab from Nasopharynx Updated: 05/14/24 0513     COVID19 Not Detected     Influenza A PCR Not Detected     Influenza B PCR Not Detected     RSV, PCR Not Detected    Narrative:      Fact sheet for providers: https://www.fda.gov/media/141686/download    Fact sheet for patients: https://www.fda.gov/media/932355/download    Test performed by PCR.    Blood Culture - Blood, Arm, Right [[id number]]  (Normal) Collected: 05/14/24 0412    Lab Status: Preliminary result Specimen: Blood from Arm, Right Updated: 05/18/24 0446     Blood Culture No growth at 4 days            XR Chest 1 View    Result Date: 5/14/2024  Examination: XR CHEST 1 VW-  Date of Exam: 5/14/2024 3:48 AM  Indication: cough.   Comparison: 5/11/2024.  Technique: 1 view of the chest  Findings: No significant consolidation. Small right-sided pleural effusion present, stable to improved as compared to the previous study. Stable right subclavian AICD/pacemaker device. Stable left-sided Mediport. No pneumothorax. The heart size is normal. The pulmonary vasculature appears within normal limits. No acute osseous abnormality identified.      Small right-sided pleural effusion, stable to improved as compared to the previous study. No significant airspace disease.  This report was finalized on 5/14/2024 4:10 AM by Desiree Matthews MD.      XR Chest 1 View    Result Date: 5/11/2024  XR CHEST 1 VW Date of Exam: 5/11/2024 12:11 AM EDT Indication: dyspnea Comparison: 5/6/2024. Findings: There are no airspace consolidations. Stable scarring present within the right lung base with stable small right-sided pleural effusion. Stable right subclavian AICD/pacemaker device. Mild linear atelectatic changes are present within the left lung base which appear new.. No pneumothorax. The pulmonary vasculature appears within normal limits. The cardiac and mediastinal silhouette appear unremarkable. No acute osseous abnormality identified.     Impression: New mild linear left basilar atelectasis. Otherwise, stable with redemonstration of small right-sided pleural effusion.. Electronically Signed: Desiree Matthews MD  5/11/2024 12:25 AM EDT  Workstation ID: [id number]    Adult Transesophageal Echo 3D (DAMIÁN) W/ Cont If Necessary Per Protocol    Result Date: 5/9/2024    Left ventricular systolic function is normal. Left ventricular ejection fraction appears to be 61 - 65%. Normal left ventricular cavity size noted. Left ventricular wall thickness is consistent with mild concentric hypertrophy. All left ventricular wall segments contract normally.   There is mild calcification of the aortic valve. The aortic valve appears trileaflet. Mild aortic valve regurgitation is present. No  aortic valve stenosis is present. There is no evidence of an aortic valve mass is present.   There is mild calcification of the mitral valve. There is mild, bileaflet mitral valve thickening present. No evidence of a mitral valve mass is present. Mild to moderate mitral valve regurgitation is present. No significant mitral valve stenosis is present.   The tricuspid valve is structurally normal with no significant stenosis present. There is no evidence of a mass on the tricuspid valve. Trace tricuspid valve regurgitation is present.   The pulmonic valve is grossly normal in structure. There is trace pulmonic valve regurgitation present.   No vegetation seen on atrial aspect of pacemaker leads.  The most distal aspects of the RV lead were not completely visualized however there was no apparent vegetation in the more proximal segment, adjacent to the tricuspid valve which appears to be functioning normally.              Results for orders placed during the hospital encounter of 05/05/24    Adult Transesophageal Echo 3D (DAMIÁN) W/ Cont If Necessary Per Protocol    Interpretation Summary    Left ventricular systolic function is normal. Left ventricular ejection fraction appears to be 61 - 65%. Normal left ventricular cavity size noted. Left ventricular wall thickness is consistent with mild concentric hypertrophy. All left ventricular wall segments contract normally.    There is mild calcification of the aortic valve. The aortic valve appears trileaflet. Mild aortic valve regurgitation is present. No aortic valve stenosis is present. There is no evidence of an aortic valve mass is present.    There is mild calcification of the mitral valve. There is mild, bileaflet mitral valve thickening present. No evidence of a mitral valve mass is present. Mild to moderate mitral valve regurgitation is present. No significant mitral valve stenosis is present.    The tricuspid valve is structurally normal with no significant stenosis  present. There is no evidence of a mass on the tricuspid valve. Trace tricuspid valve regurgitation is present.    The pulmonic valve is grossly normal in structure. There is trace pulmonic valve regurgitation present.    No vegetation seen on atrial aspect of pacemaker leads.  The most distal aspects of the RV lead were not completely visualized however there was no apparent vegetation in the more proximal segment, adjacent to the tricuspid valve which appears to be functioning normally.      Plan for Follow-up of Pending Labs/Results:   Pending Labs       Order Current Status    ANCA Panel In process    Blood Culture - Blood, Arm, Left Preliminary result    Blood Culture - Blood, Arm, Right Preliminary result          Discharge Details        Discharge Medications        New Medications        Instructions Start Date   midodrine 10 MG tablet  Commonly known as: PROAMATINE   5 mg, Oral, 3 Times Daily PRN             Changes to Medications        Instructions Start Date   amLODIPine 5 MG tablet  Commonly known as: NORVASC  What changed: additional instructions   5 mg, Oral, Every 24 Hours Scheduled, PRN for systolic blood pressures greater then 180.             Continue These Medications        Instructions Start Date   albuterol sulfate  (90 Base) MCG/ACT inhaler  Commonly known as: PROVENTIL HFA;VENTOLIN HFA;PROAIR HFA   2 puffs, Inhalation, Every 4 Hours PRN      cefepime 2000 mg IVPB in 100 mL NS (MBP)   2,000 mg, Intravenous, Every 24 Hours      ferrous sulfate 325 (65 FE) MG tablet   325 mg, Oral, Daily With Breakfast, OTC      fluticasone 27.5 MCG/SPRAY nasal spray  Commonly known as: VERAMYST   2 sprays, Nasal, Once As Needed      HYDROcodone-acetaminophen 7.5-325 MG per tablet  Commonly known as: Norco   1 tablet, Oral, Every 6 Hours PRN      lidocaine-prilocaine 2.5-2.5 % cream  Commonly known as: EMLA   1 application , Topical, As Needed      omeprazole 40 MG capsule  Commonly known as: priLOSEC    "40 mg, Oral, Daily      ondansetron 8 MG tablet  Commonly known as: ZOFRAN   8 mg, Oral, 3 Times Daily PRN      pravastatin 80 MG tablet  Commonly known as: PRAVACHOL   80 mg, Oral, Nightly      sildenafil 100 MG tablet  Commonly known as: VIAGRA   50 mg, Oral, Daily PRN      Trelegy Ellipta 100-62.5-25 MCG/ACT inhaler  Generic drug: Fluticasone-Umeclidin-Vilant   1 puff, Inhalation, Daily - RT      vitamin b complex capsule capsule   1 capsule, Oral, Daily, OTC               Allergies   Allergen Reactions    Cymbalta [Duloxetine Hcl] GI Intolerance    Gabapentin Mental Status Change     Pt states that this medication \"makes him feel foolish in his head\".     Remeron [Mirtazapine] Other (See Comments)     Excess sedation    Toradol [Ketorolac Tromethamine] GI Intolerance     Projectile vomiting     Latex Other (See Comments)     Latex allergy     Tape Rash         Discharge Disposition:  Home or Self Care    Diet:  Hospital:  No active diet order      Diet Instructions       Diet: Regular/House Diet; Regular (IDDSI 7); Thin (IDDSI 0)      Discharge Diet: Regular/House Diet    Texture: Regular (IDDSI 7)    Fluid Consistency: Thin (IDDSI 0)             Activity:  Activity Instructions       Activity as Tolerated              Restrictions or Other Recommendations:         CODE STATUS:    Code Status and Medical Interventions:   Ordered at: 05/14/24 0751     Code Status (Patient has no pulse and is not breathing):    CPR (Attempt to Resuscitate)     Medical Interventions (Patient has pulse or is breathing):    Full Support       Future Appointments   Date Time Provider Department Center   5/22/2024  9:45 AM Hayley Castellanos APRN [facility]   6/3/2024  4:45 PM Hayley Castellanos APRN [facility]   7/10/2024 11:00 AM Kayy Box MD [facility]   8/19/2024  1:30 PM [facility], PFT LAB 2 [facility]   8/19/2024  2:15 PM Octaviano Sampson MD [facility] "                 Sandra Sinclair MD  05/18/24      Time Spent on Discharge:  I spent  50  minutes on this discharge activity which included: face-to-face encounter with the patient, reviewing the data in the system, coordination of the care with the nursing staff as well as consultants, documentation, and entering orders.

## 2024-05-19 ENCOUNTER — APPOINTMENT (OUTPATIENT)
Dept: CT IMAGING | Facility: HOSPITAL | Age: 74
DRG: 871 | End: 2024-05-19
Payer: MEDICARE

## 2024-05-19 ENCOUNTER — HOSPITAL ENCOUNTER (EMERGENCY)
Facility: HOSPITAL | Age: 74
Discharge: LEFT BEFORE DISCHARGE | DRG: 871 | End: 2024-05-19
Attending: EMERGENCY MEDICINE | Admitting: FAMILY MEDICINE
Payer: MEDICARE

## 2024-05-19 ENCOUNTER — APPOINTMENT (OUTPATIENT)
Dept: GENERAL RADIOLOGY | Facility: HOSPITAL | Age: 74
DRG: 871 | End: 2024-05-19
Payer: MEDICARE

## 2024-05-19 VITALS
TEMPERATURE: 97.8 F | DIASTOLIC BLOOD PRESSURE: 80 MMHG | SYSTOLIC BLOOD PRESSURE: 110 MMHG | OXYGEN SATURATION: 98 % | BODY MASS INDEX: 23.3 KG/M2 | RESPIRATION RATE: 18 BRPM | HEIGHT: 72 IN | HEART RATE: 70 BPM | WEIGHT: 172 LBS

## 2024-05-19 DIAGNOSIS — N17.9 ACUTE RENAL FAILURE SUPERIMPOSED ON STAGE 5 CHRONIC KIDNEY DISEASE, NOT ON CHRONIC DIALYSIS, UNSPECIFIED ACUTE RENAL FAILURE TYPE: ICD-10-CM

## 2024-05-19 DIAGNOSIS — J90 PLEURAL EFFUSION: ICD-10-CM

## 2024-05-19 DIAGNOSIS — Z85.118 HX OF CANCER OF LUNG: ICD-10-CM

## 2024-05-19 DIAGNOSIS — N18.5 ACUTE RENAL FAILURE SUPERIMPOSED ON STAGE 5 CHRONIC KIDNEY DISEASE, NOT ON CHRONIC DIALYSIS, UNSPECIFIED ACUTE RENAL FAILURE TYPE: ICD-10-CM

## 2024-05-19 DIAGNOSIS — R78.81 BACTEREMIA: ICD-10-CM

## 2024-05-19 DIAGNOSIS — K52.9 COLITIS: Primary | ICD-10-CM

## 2024-05-19 DIAGNOSIS — J81.0 ACUTE PULMONARY EDEMA: ICD-10-CM

## 2024-05-19 LAB
ALBUMIN SERPL-MCNC: 3.6 G/DL (ref 3.5–5.2)
ALBUMIN/GLOB SERPL: 1 G/DL
ALP SERPL-CCNC: 98 U/L (ref 39–117)
ALT SERPL W P-5'-P-CCNC: 9 U/L (ref 1–41)
ANION GAP SERPL CALCULATED.3IONS-SCNC: 13 MMOL/L (ref 5–15)
AST SERPL-CCNC: 11 U/L (ref 1–40)
BACTERIA SPEC AEROBE CULT: NORMAL
BACTERIA SPEC AEROBE CULT: NORMAL
BACTERIA UR QL AUTO: ABNORMAL /HPF
BASOPHILS # BLD AUTO: 0.06 10*3/MM3 (ref 0–0.2)
BASOPHILS NFR BLD AUTO: 0.3 % (ref 0–1.5)
BILIRUB SERPL-MCNC: 0.2 MG/DL (ref 0–1.2)
BILIRUB UR QL STRIP: NEGATIVE
BUN SERPL-MCNC: 34 MG/DL (ref 8–23)
BUN/CREAT SERPL: 7.2 (ref 7–25)
CALCIUM SPEC-SCNC: 9.2 MG/DL (ref 8.6–10.5)
CHLORIDE SERPL-SCNC: 104 MMOL/L (ref 98–107)
CLARITY UR: ABNORMAL
CO2 SERPL-SCNC: 19 MMOL/L (ref 22–29)
COLOR UR: YELLOW
CREAT SERPL-MCNC: 4.73 MG/DL (ref 0.76–1.27)
D-LACTATE SERPL-SCNC: 1.3 MMOL/L (ref 0.5–2)
DEPRECATED RDW RBC AUTO: 59.1 FL (ref 37–54)
EGFRCR SERPLBLD CKD-EPI 2021: 12.2 ML/MIN/1.73
EOSINOPHIL # BLD AUTO: 0.16 10*3/MM3 (ref 0–0.4)
EOSINOPHIL NFR BLD AUTO: 0.7 % (ref 0.3–6.2)
ERYTHROCYTE [DISTWIDTH] IN BLOOD BY AUTOMATED COUNT: 17 % (ref 12.3–15.4)
GLOBULIN UR ELPH-MCNC: 3.7 GM/DL
GLUCOSE SERPL-MCNC: 118 MG/DL (ref 65–99)
GLUCOSE UR STRIP-MCNC: ABNORMAL MG/DL
HCT VFR BLD AUTO: 32.3 % (ref 37.5–51)
HGB BLD-MCNC: 10 G/DL (ref 13–17.7)
HGB UR QL STRIP.AUTO: ABNORMAL
HOLD SPECIMEN: NORMAL
HYALINE CASTS UR QL AUTO: ABNORMAL /LPF
IMM GRANULOCYTES # BLD AUTO: 0.19 10*3/MM3 (ref 0–0.05)
IMM GRANULOCYTES NFR BLD AUTO: 0.9 % (ref 0–0.5)
KETONES UR QL STRIP: NEGATIVE
LEUKOCYTE ESTERASE UR QL STRIP.AUTO: ABNORMAL
LYMPHOCYTES # BLD AUTO: 1.02 10*3/MM3 (ref 0.7–3.1)
LYMPHOCYTES NFR BLD AUTO: 4.7 % (ref 19.6–45.3)
MAGNESIUM SERPL-MCNC: 1.5 MG/DL (ref 1.6–2.4)
MCH RBC QN AUTO: 29.3 PG (ref 26.6–33)
MCHC RBC AUTO-ENTMCNC: 31 G/DL (ref 31.5–35.7)
MCV RBC AUTO: 94.7 FL (ref 79–97)
MONOCYTES # BLD AUTO: 1.37 10*3/MM3 (ref 0.1–0.9)
MONOCYTES NFR BLD AUTO: 6.3 % (ref 5–12)
NEUTROPHILS NFR BLD AUTO: 19.05 10*3/MM3 (ref 1.7–7)
NEUTROPHILS NFR BLD AUTO: 87.1 % (ref 42.7–76)
NITRITE UR QL STRIP: NEGATIVE
NRBC BLD AUTO-RTO: 0 /100 WBC (ref 0–0.2)
PH UR STRIP.AUTO: 7 [PH] (ref 5–8)
PLATELET # BLD AUTO: 326 10*3/MM3 (ref 140–450)
PMV BLD AUTO: 8.4 FL (ref 6–12)
POTASSIUM SERPL-SCNC: 4.2 MMOL/L (ref 3.5–5.2)
PROCALCITONIN SERPL-MCNC: 1.03 NG/ML (ref 0–0.25)
PROT SERPL-MCNC: 7.3 G/DL (ref 6–8.5)
PROT UR QL STRIP: ABNORMAL
QT INTERVAL: 424 MS
QTC INTERVAL: 457 MS
RBC # BLD AUTO: 3.41 10*6/MM3 (ref 4.14–5.8)
RBC # UR STRIP: ABNORMAL /HPF
REF LAB TEST METHOD: ABNORMAL
SODIUM SERPL-SCNC: 136 MMOL/L (ref 136–145)
SP GR UR STRIP: 1.01 (ref 1–1.03)
SQUAMOUS #/AREA URNS HPF: ABNORMAL /HPF
TROPONIN T SERPL HS-MCNC: 33 NG/L
UROBILINOGEN UR QL STRIP: ABNORMAL
WBC # UR STRIP: ABNORMAL /HPF
WBC NRBC COR # BLD AUTO: 21.85 10*3/MM3 (ref 3.4–10.8)
WHOLE BLOOD HOLD COAG: NORMAL
WHOLE BLOOD HOLD SPECIMEN: NORMAL

## 2024-05-19 PROCEDURE — 87899 AGENT NOS ASSAY W/OPTIC: CPT | Performed by: INTERNAL MEDICINE

## 2024-05-19 PROCEDURE — 96365 THER/PROPH/DIAG IV INF INIT: CPT

## 2024-05-19 PROCEDURE — 25010000002 CEFEPIME PER 500 MG: Performed by: NURSE PRACTITIONER

## 2024-05-19 PROCEDURE — 83605 ASSAY OF LACTIC ACID: CPT | Performed by: NURSE PRACTITIONER

## 2024-05-19 PROCEDURE — 71250 CT THORAX DX C-: CPT

## 2024-05-19 PROCEDURE — 93005 ELECTROCARDIOGRAM TRACING: CPT | Performed by: EMERGENCY MEDICINE

## 2024-05-19 PROCEDURE — 80053 COMPREHEN METABOLIC PANEL: CPT | Performed by: EMERGENCY MEDICINE

## 2024-05-19 PROCEDURE — 81001 URINALYSIS AUTO W/SCOPE: CPT | Performed by: EMERGENCY MEDICINE

## 2024-05-19 PROCEDURE — 99284 EMERGENCY DEPT VISIT MOD MDM: CPT

## 2024-05-19 PROCEDURE — 71045 X-RAY EXAM CHEST 1 VIEW: CPT

## 2024-05-19 PROCEDURE — 87086 URINE CULTURE/COLONY COUNT: CPT | Performed by: NURSE PRACTITIONER

## 2024-05-19 PROCEDURE — 87040 BLOOD CULTURE FOR BACTERIA: CPT | Performed by: NURSE PRACTITIONER

## 2024-05-19 PROCEDURE — 83735 ASSAY OF MAGNESIUM: CPT | Performed by: EMERGENCY MEDICINE

## 2024-05-19 PROCEDURE — 85025 COMPLETE CBC W/AUTO DIFF WBC: CPT

## 2024-05-19 PROCEDURE — 93005 ELECTROCARDIOGRAM TRACING: CPT

## 2024-05-19 PROCEDURE — 74176 CT ABD & PELVIS W/O CONTRAST: CPT

## 2024-05-19 PROCEDURE — 84145 PROCALCITONIN (PCT): CPT | Performed by: NURSE PRACTITIONER

## 2024-05-19 PROCEDURE — 84484 ASSAY OF TROPONIN QUANT: CPT | Performed by: EMERGENCY MEDICINE

## 2024-05-19 RX ORDER — SODIUM CHLORIDE 0.9 % (FLUSH) 0.9 %
10 SYRINGE (ML) INJECTION AS NEEDED
Status: DISCONTINUED | OUTPATIENT
Start: 2024-05-19 | End: 2024-05-19 | Stop reason: HOSPADM

## 2024-05-19 RX ORDER — MIDODRINE HYDROCHLORIDE 10 MG/1
5 TABLET ORAL 3 TIMES DAILY PRN
Qty: 8 TABLET | Refills: 0 | Status: SHIPPED | OUTPATIENT
Start: 2024-05-19 | End: 2024-05-23 | Stop reason: HOSPADM

## 2024-05-19 RX ADMIN — CEFEPIME 2000 MG: 2 INJECTION, POWDER, FOR SOLUTION INTRAVENOUS at 10:25

## 2024-05-19 NOTE — ED NOTES
David Barfield    Nursing Report ED to Floor:  Mental status: ALERT AND ORIENTEDX4  Ambulatory status: ASSISTANCE  Oxygen Therapy:  ROOM AIR  Cardiac Rhythm: NORMAL SINUS RHYTHM  Admitted from: ED  Safety Concerns:  NONE NOTED  Social Issues: NONE NOTED  ED Room #:  05    ED Nurse Phone Extension - [phone] or may call [phone].      HPI:   Chief Complaint   Patient presents with    Weakness - Generalized       Past Medical History:  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    Coronary artery disease 2019    Diabetes mellitus Borderline    Emphysema/COPD     Erectile disorder     GERD (gastroesophageal reflux disease)     History of chemotherapy     Hyperlipidemia     Hypertension 2019    Lung nodule     Mumps     Mumps     Pruritus     after bath    Slow to wake up after anesthesia     Stage 5 chronic kidney disease not on chronic dialysis 5/14/2024    Wears dentures     upper only    Wears hearing aid in both ears     usually only wears right        Past Surgical History:  Past Surgical History:   Procedure Laterality Date    BONE BIOPSY      broken bone surgery in his face    BRONCHOSCOPY THORACOTOMY Right 01/09/2024    Procedure: THORACOTOMY FOR LOWER LOBECTOMY AND MEDISTINAL LYMPH NODE DISSECTION RIGHT;  Surgeon: Joey Patel MD;  Location: [facility] OR;  Service: Cardiothoracic;  Laterality: Right;    BRONCHOSCOPY WITH ION ROBOTIC ASSIST N/A 09/15/2023    Procedure: BRONCHOSCOPY NAVIGATION WITH ENDOBRONCHIAL ULTRASOUND AND ION ROBOT;  Surgeon: Octaviano Sampson MD;  Location:  [facility];  Service: Robotics - Pulmonary;  Laterality: N/A;  ion #6 - 0032  - 0015  Cath guide 0061    EBUS balloon removed and intact    CARDIAC ELECTROPHYSIOLOGY PROCEDURE N/A 08/17/2021    Procedure: Pacemaker DC new;  Surgeon: Kayy Box MD;  Location:  [facility];  Service:  Cardiology;  Laterality: N/A;    FACIAL FRACTURE SURGERY      LYMPH NODE BIOPSY  2023    PACEMAKER IMPLANTATION          Admitting Doctor:   No admitting provider for patient encounter.    Consulting Provider(s):  Consults       Date and Time Order Name Status Description    5/15/2024 11:48 AM Inpatient Nephrology Consult Completed     5/6/2024 12:10 PM Inpatient Nephrology Consult Completed     5/6/2024  3:25 AM Inpatient Infectious Diseases Consult Completed     4/25/2024  2:19 PM Inpatient Hematology & Oncology Consult Completed     4/25/2024  9:52 AM Inpatient Nephrology Consult Completed     4/24/2024  3:19 PM Inpatient Infectious Diseases Consult Completed     4/13/2024  2:55 AM Inpatient Infectious Diseases Consult Completed              Admitting Diagnosis:   The primary encounter diagnosis was Colitis. Diagnoses of Acute renal failure superimposed on stage 5 chronic kidney disease, not on chronic dialysis, unspecified acute renal failure type, Pleural effusion, Hx of cancer of lung, Acute pulmonary edema, and Bacteremia were also pertinent to this visit.    Most Recent Vitals:   Vitals:    05/19/24 1058 05/19/24 1113 05/19/24 1118 05/19/24 1133   BP: 123/75      BP Location:       Patient Position:       Pulse: 70 71 70 70   Resp:       Temp:       TempSrc:       SpO2: 95% 97% 96% 96%   Weight:       Height:           Active LDAs/IV Access:   Lines, Drains & Airways       Active LDAs       Name Placement date Placement time Site Days    Peripheral IV 05/19/24 1019 Anterior;Left Forearm 05/19/24  1019  Forearm  less than 1                    Labs (abnormal labs have a star):   Labs Reviewed   COMPREHENSIVE METABOLIC PANEL - Abnormal; Notable for the following components:       Result Value    Glucose 118 (*)     BUN 34 (*)     Creatinine 4.73 (*)     CO2 19.0 (*)     eGFR 12.2 (*)     All other components within normal limits    Narrative:     GFR Normal >60  Chronic Kidney Disease <60  Kidney Failure  <15    The GFR formula is only valid for adults with stable renal function between ages 18 and 70.   SINGLE HS TROPONIN T - Abnormal; Notable for the following components:    HS Troponin T 33 (*)     All other components within normal limits    Narrative:     High Sensitive Troponin T Reference Range:  <14.0 ng/L- Negative Female for AMI  <22.0 ng/L- Negative Male for AMI  >=14 - Abnormal Female indicating possible myocardial injury.  >=22 - Abnormal Male indicating possible myocardial injury.   Clinicians would have to utilize clinical acumen, EKG, Troponin, and serial changes to determine if it is an Acute Myocardial Infarction or myocardial injury due to an underlying chronic condition.        MAGNESIUM - Abnormal; Notable for the following components:    Magnesium 1.5 (*)     All other components within normal limits   URINALYSIS W/ MICROSCOPIC IF INDICATED (NO CULTURE) - Abnormal; Notable for the following components:    Appearance, UA Cloudy (*)     Glucose,  mg/dL (Trace) (*)     Blood, UA Small (1+) (*)     Protein,  mg/dL (2+) (*)     Leuk Esterase, UA Large (3+) (*)     All other components within normal limits   CBC WITH AUTO DIFFERENTIAL - Abnormal; Notable for the following components:    WBC 21.85 (*)     RBC 3.41 (*)     Hemoglobin 10.0 (*)     Hematocrit 32.3 (*)     MCHC 31.0 (*)     RDW 17.0 (*)     RDW-SD 59.1 (*)     Neutrophil % 87.1 (*)     Lymphocyte % 4.7 (*)     Immature Grans % 0.9 (*)     Neutrophils, Absolute 19.05 (*)     Monocytes, Absolute 1.37 (*)     Immature Grans, Absolute 0.19 (*)     All other components within normal limits   PROCALCITONIN - Abnormal; Notable for the following components:    Procalcitonin 1.03 (*)     All other components within normal limits    Narrative:     As a Marker for Sepsis (Non-Neonates):    1. <0.5 ng/mL represents a low risk of severe sepsis and/or septic shock.  2. >2 ng/mL represents a high risk of severe sepsis and/or septic  "shock.    As a Marker for Lower Respiratory Tract Infections that require antibiotic therapy:    PCT on Admission    Antibiotic Therapy       6-12 Hrs later    >0.5                Strongly Recommended  >0.25 - <0.5        Recommended   0.1 - 0.25          Discouraged              Remeasure/reassess PCT  <0.1                Strongly Discouraged     Remeasure/reassess PCT    As 28 day mortality risk marker: \"Change in Procalcitonin Result\" (>80% or <=80%) if Day 0 (or Day 1) and Day 4 values are available. Refer to http://www.[facility]-pct-calculator.com    Change in PCT <=80%  A decrease of PCT levels below or equal to 80% defines a positive change in PCT test result representing a higher risk for 28-day all-cause mortality of patients diagnosed with severe sepsis for septic shock.    Change in PCT >80%  A decrease of PCT levels of more than 80% defines a negative change in PCT result representing a lower risk for 28-day all-cause mortality of patients diagnosed with severe sepsis or septic shock.      URINALYSIS, MICROSCOPIC ONLY - Abnormal; Notable for the following components:    RBC, UA 3-5 (*)     WBC, UA Too Numerous to Count (*)     All other components within normal limits   LACTIC ACID, PLASMA - Normal   BLOOD CULTURE   BLOOD CULTURE   URINE CULTURE   RAINBOW DRAW    Narrative:     The following orders were created for panel order [place] Draw.  Procedure                               Abnormality         Status                     ---------                               -----------         ------                     Green Top (Gel)[[id number]]                                  Final result               Lavender Top[[id number]]                                     Final result               Gold Top - [facility][[id number]]                                   Final result               Gray Top[[id number]]                                         Final result               Light Blue Top[[id number]]                              "      Final result                 Please view results for these tests on the individual orders.   CBC AND DIFFERENTIAL    Narrative:     The following orders were created for panel order CBC & Differential.  Procedure                               Abnormality         Status                     ---------                               -----------         ------                     CBC Auto Differential[[id number]]        Abnormal            Final result                 Please view results for these tests on the individual orders.   GREEN TOP   LAVENDER TOP   GOLD TOP - SST   GRAY TOP   LIGHT BLUE TOP       Meds Given in ED:   Medications   sodium chloride 0.9 % flush 10 mL (has no administration in time range)   cefepime 2000 mg IVPB in 100 mL NS (MBP) (0 mg Intravenous Stopped 5/19/24 1103)           Last NIH score:                                                          Dysphagia screening results:        [place] Coma Scale:  No data recorded     CIWA:        Restraint Type:            Isolation Status:  No active isolations

## 2024-05-19 NOTE — Clinical Note
Level of Care: Telemetry [5]   Diagnosis: ARACELI (acute kidney injury) [389276]   Certification: I Certify That Inpatient Hospital Services Are Medically Necessary For Greater Than 2 Midnights

## 2024-05-19 NOTE — ED PROVIDER NOTES
"Subjective   History of Present Illness  Patient presents the ER with generalized weakness and low blood pressure along with exertional difficulty breathing.  Several recent admissions to the hospital.  Currently being treated for lung cancer by Dr. Ashley.  He also has a history of kidney disease is reportedly getting worse and they are considering dialysis.  [facility]  He is also being followed by infectious disease for concerns of sepsis with no clear source.  He takes cefepime 2 g daily.  He has not had his dose today.        Review of Systems    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    Coronary artery disease 2019    Diabetes mellitus Borderline    Emphysema/COPD     Erectile disorder     GERD (gastroesophageal reflux disease)     History of chemotherapy     Hyperlipidemia     Hypertension 2019    Lung nodule     Mumps     Mumps     Pruritus     after bath    Slow to wake up after anesthesia     Stage 5 chronic kidney disease not on chronic dialysis 5/14/2024    Wears dentures     upper only    Wears hearing aid in both ears     usually only wears right       Allergies   Allergen Reactions    Cymbalta [Duloxetine Hcl] GI Intolerance    Gabapentin Mental Status Change     Pt states that this medication \"makes him feel foolish in his head\".     Remeron [Mirtazapine] Other (See Comments)     Excess sedation    Toradol [Ketorolac Tromethamine] GI Intolerance     Projectile vomiting     Latex Other (See Comments)     Latex allergy     Tape Rash       Past Surgical History:   Procedure Laterality Date    BONE BIOPSY      broken bone surgery in his face    BRONCHOSCOPY THORACOTOMY Right 01/09/2024    Procedure: THORACOTOMY FOR LOWER LOBECTOMY AND MEDISTINAL LYMPH NODE DISSECTION RIGHT;  Surgeon: Joey Patel MD;  Location: [facility];  Service: Cardiothoracic;  " Laterality: Right;    BRONCHOSCOPY WITH ION ROBOTIC ASSIST N/A 09/15/2023    Procedure: BRONCHOSCOPY NAVIGATION WITH ENDOBRONCHIAL ULTRASOUND AND ION ROBOT;  Surgeon: Octaviano Sampson MD;  Location:  [facility];  Service: Robotics - Pulmonary;  Laterality: N/A;  ion #6 - 0032  - 0015  Cath guide 0061    EBUS balloon removed and intact    CARDIAC ELECTROPHYSIOLOGY PROCEDURE N/A 08/17/2021    Procedure: Pacemaker DC new;  Surgeon: Kayy Box MD;  Location:  [facility];  Service: Cardiology;  Laterality: N/A;    FACIAL FRACTURE SURGERY      LYMPH NODE BIOPSY  2023    PACEMAKER IMPLANTATION         Family History   Problem Relation Age of Onset    Aneurysm Mother         brain    Dementia Father     Leukemia Sister     Heart disease Paternal Grandmother     Hypertension Paternal Grandfather     Cancer Sister        Social History     Socioeconomic History    Marital status: Single    Number of children: 3   Tobacco Use    Smoking status: Every Day     Current packs/day: 0.50     Average packs/day: 0.5 packs/day for 56.4 years (28.7 ttl pk-yrs)     Types: Cigarettes     Start date: 1/1/1968    Smokeless tobacco: Never    Tobacco comments:     Still smoke   Vaping Use    Vaping status: Never Used   Substance and Sexual Activity    Alcohol use: Never    Drug use: Never    Sexual activity: Yes     Partners: Female     Birth control/protection: None           Objective   Physical Exam  Constitutional:       Appearance: He is well-developed. He is ill-appearing.   HENT:      Head: Normocephalic and atraumatic.      Right Ear: External ear normal.      Left Ear: External ear normal.      Nose: Nose normal.   Eyes:      Conjunctiva/sclera: Conjunctivae normal.      Pupils: Pupils are equal, round, and reactive to light.   Cardiovascular:      Rate and Rhythm: Normal rate and regular rhythm.      Heart sounds: Normal heart sounds.   Pulmonary:      Effort: Pulmonary effort is normal.       Breath sounds: Normal breath sounds.   Abdominal:      General: Bowel sounds are normal.      Palpations: Abdomen is soft.   Musculoskeletal:         General: Normal range of motion.      Cervical back: Normal range of motion and neck supple.   Skin:     General: Skin is warm and dry.   Neurological:      Mental Status: He is alert and oriented to person, place, and time.   Psychiatric:         Behavior: Behavior normal.         Judgment: Judgment normal.         Procedures           ED Course  ED Course as of 05/19/24 1239   Sun May 19, 2024   1204 Broad differential including pulmonary edema.  Renal failure.  Pneumonia.  Old records reviewed.  Will need to be admitted again to the hospital.  He is already being treated with c cefepime time as well.  Mentating appropriately.  Currently blood pressure is 123/75 and is stable and not tachycardic. [JM]   1213 Spoke to Dr. Ríos and advised that the patient coming into the hospital.  They will see the patient. [JM]   1235 I had a very long sitdown conversation with the patient and wife.  I think he needs to be admitted to the hospital.  He has multiple problems including renal failure sepsis pulmonary edema etc.  He tells me he feels just fine refuses admission and wants to go home.  He will be leaving AMA.  Overall they are both very friendly.  They both are aware of my concerns.  His significant other tells me that he is very stubborn and does what he wants.  She also tells me that he left early last time because he was tired of being in the hospital.  He tells me he does not want to be in the hospital laying in the bed.  Ultimately think that is the right thing to do by admitting him to the hospital as he has multiple things going on and will need to see multiple specialist.  However he refuses.  He is aware of the possibility of death disability etc.  But overall  a very friendly relationship and I will respect his wishes.  His significant other is aware of my  concerns and overall she agrees but she would not tell him what to do and let him make his decisions.  Reassuringly he has a follow-up with infectious disease tomorrow morning. [JM]      ED Course User Index  [JM] Sumanth Mat, ANAMIKA                                 CT Chest Without Contrast Diagnostic   Final Result   Colitis. No abscess formation or perforation. Small right pleural effusion. Stable appearance of the right lower lobe density.               Electronically Signed: Kam Foy MD     5/19/2024 11:43 AM EDT     Workstation ID: [id number]      CT Abdomen Pelvis Without Contrast   Final Result   Colitis. No abscess formation or perforation. Small right pleural effusion. Stable appearance of the right lower lobe density.               Electronically Signed: Kam Foy MD     5/19/2024 11:43 AM EDT     Workstation ID: [id number]      XR Chest 1 View   Final Result   Impression:   Findings suggestive of mild pulmonary edema with small/moderate right pleural effusion. No dominant, focal consolidation. Finding superimposed upon a background of chronic/emphysematous changes.         Electronically Signed: Marco Leahy MD     5/19/2024 9:58 AM EDT     Workstation ID: [id number]                    Medical Decision Making  Problems Addressed:  Acute pulmonary edema: complicated acute illness or injury  Acute renal failure superimposed on stage 5 chronic kidney disease, not on chronic dialysis, unspecified acute renal failure type: complicated acute illness or injury  Bacteremia: complicated acute illness or injury  Colitis: complicated acute illness or injury  Hx of cancer of lung: complicated acute illness or injury  Pleural effusion: complicated acute illness or injury    Amount and/or Complexity of Data Reviewed  External Data Reviewed: labs, radiology and ECG.  Labs: ordered. Decision-making details documented in ED Course.  Radiology: ordered. Decision-making details documented in ED Course.  ECG/medicine tests:  ordered. Decision-making details documented in ED Course.    Risk  Prescription drug management.  Decision regarding hospitalization.        Final diagnoses:   Colitis   Acute renal failure superimposed on stage 5 chronic kidney disease, not on chronic dialysis, unspecified acute renal failure type   Pleural effusion   Hx of cancer of lung   Acute pulmonary edema   Bacteremia       ED Disposition  ED Disposition       ED Disposition   AMA    Condition   --    Comment                    Derian Barry MD  [address]  [address]  [place] [address]  [phone]    In 1 day           Where to Get Your Medications        These medications were sent to [facility] [place] [facility][id number] - [place], [place] - [address]  AT [facility] [address] [facility] - [phone]  - [phone]   [address] , [facility] [address]      Phone: [phone]   midodrine 10 MG tablet          Medication List      No changes were made to your prescriptions during this visit.            Mat Julio APRN  05/19/24 1208       Mat Julio APRN  05/19/24 1214       Mat Julio APRN  05/19/24 1235

## 2024-05-20 ENCOUNTER — TRANSITIONAL CARE MANAGEMENT TELEPHONE ENCOUNTER (OUTPATIENT)
Dept: CALL CENTER | Facility: HOSPITAL | Age: 74
End: 2024-05-20
Payer: MEDICARE

## 2024-05-20 ENCOUNTER — HOSPITAL ENCOUNTER (INPATIENT)
Facility: HOSPITAL | Age: 74
LOS: 2 days | Discharge: HOME | DRG: 871 | End: 2024-05-23
Attending: INTERNAL MEDICINE | Admitting: INTERNAL MEDICINE
Payer: MEDICARE

## 2024-05-20 PROBLEM — R53.1 WEAKNESS: Status: ACTIVE | Noted: 2024-05-20

## 2024-05-20 LAB
ALBUMIN SERPL-MCNC: 3.4 G/DL (ref 3.5–5.2)
ALBUMIN/GLOB SERPL: 0.8 G/DL
ALP SERPL-CCNC: 101 U/L (ref 39–117)
ALT SERPL W P-5'-P-CCNC: 9 U/L (ref 1–41)
ANION GAP SERPL CALCULATED.3IONS-SCNC: 15 MMOL/L (ref 5–15)
AST SERPL-CCNC: 13 U/L (ref 1–40)
BACTERIA SPEC AEROBE CULT: NO GROWTH
BASOPHILS # BLD AUTO: 0.06 10*3/MM3 (ref 0–0.2)
BASOPHILS NFR BLD AUTO: 0.3 % (ref 0–1.5)
BILIRUB SERPL-MCNC: 0.2 MG/DL (ref 0–1.2)
BUN SERPL-MCNC: 36 MG/DL (ref 8–23)
BUN/CREAT SERPL: 7.9 (ref 7–25)
C-ANCA TITR SER IF: NORMAL TITER
CALCIUM SPEC-SCNC: 8.9 MG/DL (ref 8.6–10.5)
CHLORIDE SERPL-SCNC: 101 MMOL/L (ref 98–107)
CO2 SERPL-SCNC: 17 MMOL/L (ref 22–29)
CREAT SERPL-MCNC: 4.57 MG/DL (ref 0.76–1.27)
DEPRECATED RDW RBC AUTO: 56.3 FL (ref 37–54)
EGFRCR SERPLBLD CKD-EPI 2021: 12.7 ML/MIN/1.73
EOSINOPHIL # BLD AUTO: 0.16 10*3/MM3 (ref 0–0.4)
EOSINOPHIL NFR BLD AUTO: 0.7 % (ref 0.3–6.2)
ERYTHROCYTE [DISTWIDTH] IN BLOOD BY AUTOMATED COUNT: 16.7 % (ref 12.3–15.4)
GLOBULIN UR ELPH-MCNC: 4.2 GM/DL
GLUCOSE SERPL-MCNC: 108 MG/DL (ref 65–99)
HCT VFR BLD AUTO: 29.8 % (ref 37.5–51)
HGB BLD-MCNC: 9.5 G/DL (ref 13–17.7)
IMM GRANULOCYTES # BLD AUTO: 0.31 10*3/MM3 (ref 0–0.05)
IMM GRANULOCYTES NFR BLD AUTO: 1.4 % (ref 0–0.5)
LYMPHOCYTES # BLD AUTO: 0.94 10*3/MM3 (ref 0.7–3.1)
LYMPHOCYTES NFR BLD AUTO: 4.2 % (ref 19.6–45.3)
MCH RBC QN AUTO: 29.2 PG (ref 26.6–33)
MCHC RBC AUTO-ENTMCNC: 31.9 G/DL (ref 31.5–35.7)
MCV RBC AUTO: 91.7 FL (ref 79–97)
MONOCYTES # BLD AUTO: 1.82 10*3/MM3 (ref 0.1–0.9)
MONOCYTES NFR BLD AUTO: 8.1 % (ref 5–12)
MYELOPEROXIDASE AB SER IA-ACNC: <0.2 UNITS (ref 0–0.9)
NEUTROPHILS NFR BLD AUTO: 19.28 10*3/MM3 (ref 1.7–7)
NEUTROPHILS NFR BLD AUTO: 85.3 % (ref 42.7–76)
NRBC BLD AUTO-RTO: 0 /100 WBC (ref 0–0.2)
P-ANCA ATYPICAL TITR SER IF: NORMAL TITER
P-ANCA TITR SER IF: NORMAL TITER
PLATELET # BLD AUTO: 324 10*3/MM3 (ref 140–450)
PMV BLD AUTO: 8.7 FL (ref 6–12)
POTASSIUM SERPL-SCNC: 4.4 MMOL/L (ref 3.5–5.2)
PROT SERPL-MCNC: 7.6 G/DL (ref 6–8.5)
PROTEINASE3 AB SER IA-ACNC: <0.2 UNITS (ref 0–0.9)
RBC # BLD AUTO: 3.25 10*6/MM3 (ref 4.14–5.8)
SODIUM SERPL-SCNC: 133 MMOL/L (ref 136–145)
WBC NRBC COR # BLD AUTO: 22.57 10*3/MM3 (ref 3.4–10.8)

## 2024-05-20 PROCEDURE — 87799 DETECT AGENT NOS DNA QUANT: CPT | Performed by: INTERNAL MEDICINE

## 2024-05-20 PROCEDURE — 99222 1ST HOSP IP/OBS MODERATE 55: CPT | Performed by: INTERNAL MEDICINE

## 2024-05-20 PROCEDURE — 86664 EPSTEIN-BARR NUCLEAR ANTIGEN: CPT | Performed by: INTERNAL MEDICINE

## 2024-05-20 PROCEDURE — 87798 DETECT AGENT NOS DNA AMP: CPT | Performed by: INTERNAL MEDICINE

## 2024-05-20 PROCEDURE — 80053 COMPREHEN METABOLIC PANEL: CPT | Performed by: INTERNAL MEDICINE

## 2024-05-20 PROCEDURE — 25010000002 HEPARIN (PORCINE) PER 1000 UNITS: Performed by: INTERNAL MEDICINE

## 2024-05-20 PROCEDURE — 94664 DEMO&/EVAL PT USE INHALER: CPT

## 2024-05-20 PROCEDURE — 87385 HISTOPLASMA CAPSUL AG IA: CPT | Performed by: INTERNAL MEDICINE

## 2024-05-20 PROCEDURE — 85025 COMPLETE CBC W/AUTO DIFF WBC: CPT | Performed by: INTERNAL MEDICINE

## 2024-05-20 PROCEDURE — 94640 AIRWAY INHALATION TREATMENT: CPT

## 2024-05-20 PROCEDURE — 25810000003 SODIUM CHLORIDE 0.9 % SOLUTION: Performed by: INTERNAL MEDICINE

## 2024-05-20 PROCEDURE — 87449 NOS EACH ORGANISM AG IA: CPT | Performed by: INTERNAL MEDICINE

## 2024-05-20 PROCEDURE — 63710000001 ONDANSETRON ODT 4 MG TABLET DISPERSIBLE: Performed by: INTERNAL MEDICINE

## 2024-05-20 PROCEDURE — 86665 EPSTEIN-BARR CAPSID VCA: CPT | Performed by: INTERNAL MEDICINE

## 2024-05-20 PROCEDURE — 87040 BLOOD CULTURE FOR BACTERIA: CPT | Performed by: INTERNAL MEDICINE

## 2024-05-20 PROCEDURE — G0378 HOSPITAL OBSERVATION PER HR: HCPCS

## 2024-05-20 RX ORDER — ONDANSETRON 2 MG/ML
4 INJECTION INTRAMUSCULAR; INTRAVENOUS EVERY 6 HOURS PRN
Status: DISCONTINUED | OUTPATIENT
Start: 2024-05-20 | End: 2024-05-23 | Stop reason: HOSPADM

## 2024-05-20 RX ORDER — BISACODYL 5 MG/1
5 TABLET, DELAYED RELEASE ORAL DAILY PRN
Status: DISCONTINUED | OUTPATIENT
Start: 2024-05-20 | End: 2024-05-22

## 2024-05-20 RX ORDER — ONDANSETRON 4 MG/1
4 TABLET, ORALLY DISINTEGRATING ORAL EVERY 6 HOURS PRN
Status: DISCONTINUED | OUTPATIENT
Start: 2024-05-20 | End: 2024-05-23 | Stop reason: HOSPADM

## 2024-05-20 RX ORDER — SODIUM CHLORIDE 0.9 % (FLUSH) 0.9 %
10 SYRINGE (ML) INJECTION AS NEEDED
Status: DISCONTINUED | OUTPATIENT
Start: 2024-05-20 | End: 2024-05-23 | Stop reason: HOSPADM

## 2024-05-20 RX ORDER — HYDROCODONE BITARTRATE AND ACETAMINOPHEN 7.5; 325 MG/1; MG/1
1 TABLET ORAL EVERY 6 HOURS PRN
Status: DISCONTINUED | OUTPATIENT
Start: 2024-05-20 | End: 2024-05-23 | Stop reason: HOSPADM

## 2024-05-20 RX ORDER — ACETAMINOPHEN 650 MG/1
650 SUPPOSITORY RECTAL EVERY 4 HOURS PRN
Status: DISCONTINUED | OUTPATIENT
Start: 2024-05-20 | End: 2024-05-23 | Stop reason: HOSPADM

## 2024-05-20 RX ORDER — HEPARIN SODIUM 5000 [USP'U]/ML
5000 INJECTION, SOLUTION INTRAVENOUS; SUBCUTANEOUS EVERY 8 HOURS SCHEDULED
Status: DISCONTINUED | OUTPATIENT
Start: 2024-05-20 | End: 2024-05-23 | Stop reason: HOSPADM

## 2024-05-20 RX ORDER — SODIUM CHLORIDE 9 MG/ML
125 INJECTION, SOLUTION INTRAVENOUS CONTINUOUS
Status: ACTIVE | OUTPATIENT
Start: 2024-05-20 | End: 2024-05-21

## 2024-05-20 RX ORDER — SODIUM CHLORIDE 9 MG/ML
40 INJECTION, SOLUTION INTRAVENOUS AS NEEDED
Status: DISCONTINUED | OUTPATIENT
Start: 2024-05-20 | End: 2024-05-23 | Stop reason: HOSPADM

## 2024-05-20 RX ORDER — PANTOPRAZOLE SODIUM 40 MG/1
40 TABLET, DELAYED RELEASE ORAL
Status: DISCONTINUED | OUTPATIENT
Start: 2024-05-21 | End: 2024-05-22

## 2024-05-20 RX ORDER — ACETAMINOPHEN 325 MG/1
650 TABLET ORAL EVERY 4 HOURS PRN
Status: DISCONTINUED | OUTPATIENT
Start: 2024-05-20 | End: 2024-05-23 | Stop reason: HOSPADM

## 2024-05-20 RX ORDER — ACETAMINOPHEN 160 MG/5ML
650 SOLUTION ORAL EVERY 4 HOURS PRN
Status: DISCONTINUED | OUTPATIENT
Start: 2024-05-20 | End: 2024-05-23 | Stop reason: HOSPADM

## 2024-05-20 RX ORDER — SODIUM CHLORIDE 0.9 % (FLUSH) 0.9 %
10 SYRINGE (ML) INJECTION EVERY 12 HOURS SCHEDULED
Status: DISCONTINUED | OUTPATIENT
Start: 2024-05-20 | End: 2024-05-23 | Stop reason: HOSPADM

## 2024-05-20 RX ORDER — AMOXICILLIN 250 MG
2 CAPSULE ORAL 2 TIMES DAILY PRN
Status: DISCONTINUED | OUTPATIENT
Start: 2024-05-20 | End: 2024-05-22

## 2024-05-20 RX ORDER — BUDESONIDE AND FORMOTEROL FUMARATE DIHYDRATE 160; 4.5 UG/1; UG/1
2 AEROSOL RESPIRATORY (INHALATION)
Status: DISCONTINUED | OUTPATIENT
Start: 2024-05-20 | End: 2024-05-23 | Stop reason: HOSPADM

## 2024-05-20 RX ORDER — PRAVASTATIN SODIUM 40 MG
80 TABLET ORAL NIGHTLY
Status: DISCONTINUED | OUTPATIENT
Start: 2024-05-20 | End: 2024-05-23 | Stop reason: HOSPADM

## 2024-05-20 RX ORDER — POLYETHYLENE GLYCOL 3350 17 G/17G
17 POWDER, FOR SOLUTION ORAL DAILY PRN
Status: DISCONTINUED | OUTPATIENT
Start: 2024-05-20 | End: 2024-05-22

## 2024-05-20 RX ORDER — MIDODRINE HYDROCHLORIDE 10 MG/1
10 TABLET ORAL 3 TIMES DAILY PRN
Status: DISCONTINUED | OUTPATIENT
Start: 2024-05-20 | End: 2024-05-23 | Stop reason: HOSPADM

## 2024-05-20 RX ORDER — BISACODYL 10 MG
10 SUPPOSITORY, RECTAL RECTAL DAILY PRN
Status: DISCONTINUED | OUTPATIENT
Start: 2024-05-20 | End: 2024-05-22

## 2024-05-20 RX ADMIN — BUDESONIDE AND FORMOTEROL FUMARATE DIHYDRATE 2 PUFF: 160; 4.5 AEROSOL RESPIRATORY (INHALATION) at 20:44

## 2024-05-20 RX ADMIN — ONDANSETRON 4 MG: 4 TABLET, ORALLY DISINTEGRATING ORAL at 19:55

## 2024-05-20 RX ADMIN — SODIUM CHLORIDE 125 ML/HR: 9 INJECTION, SOLUTION INTRAVENOUS at 18:04

## 2024-05-20 RX ADMIN — HEPARIN SODIUM 5000 UNITS: 5000 INJECTION INTRAVENOUS; SUBCUTANEOUS at 18:02

## 2024-05-20 RX ADMIN — PRAVASTATIN SODIUM 80 MG: 40 TABLET ORAL at 19:47

## 2024-05-20 RX ADMIN — Medication 10 ML: at 19:47

## 2024-05-20 NOTE — PLAN OF CARE
Goal Outcome Evaluation:           Problem: Adult Inpatient Plan of Care  Goal: Plan of Care Review  Outcome: Ongoing, Progressing  Goal: Patient-Specific Goal (Individualized)  Outcome: Ongoing, Progressing  Goal: Absence of Hospital-Acquired Illness or Injury  Outcome: Ongoing, Progressing  Goal: Optimal Comfort and Wellbeing  Outcome: Ongoing, Progressing  Goal: Readiness for Transition of Care  Outcome: Ongoing, Progressing  Intervention: Mutually Develop Transition Plan  Recent Flowsheet Documentation  Taken 5/20/2024 1857 by Marie Aparicio RN  Equipment Currently Used at Home: none  Taken 5/20/2024 1842 by Marie Aparicio, RN  Transportation Anticipated: family or friend will provide  Transportation Concerns: none  Patient/Family Anticipated Services at Transition:   Patient/Family Anticipates Transition to: home with family

## 2024-05-20 NOTE — PROGRESS NOTES
"Enter Query Response Below      Query Response: Severe Malnutrition              If applicable, please update the problem list.     Patient: David [facility] \"Carlo\"        : 1950  Account: [id number]           Admit Date: 2024        How to Respond to this query:       a. Click New Note     b. Answer query within the yellow box.                c. Update the Problem List, if applicable.      If you have any questions about this query contact me at: [email or web address]     Dr. Wiggins:    2024 H&P:  \"74-year-old male with history of non-small cell lung cancer s/p neoadjuvant chemoradiation and right lower lobe lobectomy and currently on immunotherapy, hypertension, emphysema with ongoing tobacco abuse presenting with complaints of chills back pain, near syncopal episode with nausea and vomiting and 1 episode of diarrhea.  Patient was feeling extremely weak.\"    Inpatient Nutrition Consult ordered 2024.    Malnutrition Severity Assessment done 2024 with the following documented by the Registered Dietitian:    -Patient meets criteria for : Severe Malnutrition (Pt meets criteria for severe chronic malnutrition based on wt intake hx w wasting.)  -Malnutrition Type: Chronic Disease - Related Malnutrition  -Insufficient Energy Intake Findings Severe--<75% of est. energy requirement for > or equal to 1 month  -Unintentional Weight Loss Findings: Severe -Weight loss greater than 7.5% in three months  -Loss of Muscle Mass Findings -Mild  -Subcutaneous Fat Loss Findings-Mild  -Severe Malnutrition -Pt meets criteria for severe chronic malnutrition based on wt intake hx w wasting.    Treatment included dietary monitoring. \"Supplements were offered and refused\" per RD.       Please clarify diagnosis treated/monitored:    Chronic illness related severe protein calorie malnutrition  Other- specify __________  Unable to determine     By submitting this query, we are merely seeking further clarification " of documentation to accurately reflect all conditions that you are monitoring, evaluating, treating or that extend the hospitalization or utilize additional resources of care. Please utilize your independent clinical judgment when addressing the question(s) above.     This query and your response, once completed, will be entered into the legal medical record.    Sincerely,  Shanika De La Paz  Clinical Documentation Integrity Program

## 2024-05-20 NOTE — OUTREACH NOTE
Call Center TCM Note      Flowsheet Row Responses   [facility] patient discharged from? [facility]   Does the patient have one of the following disease processes/diagnoses(primary or secondary)? Sepsis   TCM attempt successful? No   Unsuccessful attempts Attempt 2   Change in Health Status Readmitted            Chapis Watson RN    5/20/2024, 15:44 EDT

## 2024-05-20 NOTE — H&P
[facility] Medicine Services  HISTORY AND PHYSICAL    Patient Name: David Barfield  : 1950  MRN: [id number]  Primary Care Physician: Hayley Castellanos APRN  Date of admission: 2024      Subjective   Subjective     Chief Complaint: weakness    HPI:  David Barfield is a 74 y.o. male with NSCLC with multiple recent admissions for sepsis of unknown source returning for same. He was seen in ED yesterday and admission was recommended however he left AMA. He was seen by Dr. Barry today in clinic who admitted him from his office. Patient states there have been no real changes since he left on  and since he was seen in ED . He remains weak w/ orthostasis and with ROBERT.      Personal History     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    Coronary artery disease 2019    Diabetes mellitus Borderline    Emphysema/COPD     Erectile disorder     GERD (gastroesophageal reflux disease)     History of chemotherapy     Hyperlipidemia     Hypertension 2019    Lung nodule     Mumps     Mumps     Pruritus     after bath    Slow to wake up after anesthesia     Stage 5 chronic kidney disease not on chronic dialysis 2024    Wears dentures     upper only    Wears hearing aid in both ears     usually only wears right         Oncology Problem List:  Lung cancer (2024; Status: Resolved)  Bronchogenic cancer of right lung (10/04/2023; Status: Active)  Malignant neoplasm of lower lobe of right lung (10/04/2023; Status:   Active)  Oncology/Hematology History   Malignant neoplasm of lower lobe of right lung   10/4/2023 Initial Diagnosis    Malignant neoplasm of lower lobe of right lung     10/4/2023 Cancer Staged    Staging form: Lung, AJCC 8th Edition  - Clinical: Stage IIIA (cT2b, cN2, cM0) - Signed by Neetu Ashley MD on 10/4/2023     10/23/2023 - 2023  Chemotherapy    OP LUNG  Nivolumab 360mg /  PACLitaxel / CARBOplatin AUC=5      10/23/2023 -  Chemotherapy    OP CENTRAL VENOUS ACCESS DEVICE Access, Care, and Maintenance (CVAD)     2/6/2024 - 2/27/2024 Chemotherapy    OP LUNG Atezolizumab       Past Surgical History:   Procedure Laterality Date    BONE BIOPSY      broken bone surgery in his face    BRONCHOSCOPY THORACOTOMY Right 01/09/2024    Procedure: THORACOTOMY FOR LOWER LOBECTOMY AND MEDISTINAL LYMPH NODE DISSECTION RIGHT;  Surgeon: Joey Patel MD;  Location:  [facility];  Service: Cardiothoracic;  Laterality: Right;    BRONCHOSCOPY WITH ION ROBOTIC ASSIST N/A 09/15/2023    Procedure: BRONCHOSCOPY NAVIGATION WITH ENDOBRONCHIAL ULTRASOUND AND ION ROBOT;  Surgeon: Octaviano Sampson MD;  Location:  [facility];  Service: Robotics - Pulmonary;  Laterality: N/A;  ion #6 - 0032  - 0015  Cath guide 0061    EBUS balloon removed and intact    CARDIAC ELECTROPHYSIOLOGY PROCEDURE N/A 08/17/2021    Procedure: Pacemaker DC new;  Surgeon: Kayy Box MD;  Location:  [facility] CATH INVASIVE LOCATION;  Service: Cardiology;  Laterality: N/A;    FACIAL FRACTURE SURGERY      LYMPH NODE BIOPSY  2023    PACEMAKER IMPLANTATION         Family History: family history includes Aneurysm in his mother; Cancer in his sister; Dementia in his father; Heart disease in his paternal grandmother; Hypertension in his paternal grandfather; Leukemia in his sister.     Social History:  reports that he has been smoking cigarettes. He started smoking about 56 years ago. He has a 28.7 pack-year smoking history. He has never used smokeless tobacco. He reports that he does not drink alcohol and does not use drugs.  Social History     Social History Narrative    Lives in [place], [place]       Medications:  Available home medication information reviewed.  Fluticasone-Umeclidin-Vilant, HYDROcodone-acetaminophen, albuterol sulfate HFA, amLODIPine, cefepime 2000 mg IVPB in 100 mL NS  "(MBP), ferrous sulfate, fluticasone, lidocaine-prilocaine, midodrine, omeprazole, ondansetron, pravastatin, sildenafil, and vitamin b complex    Allergies   Allergen Reactions    Cymbalta [Duloxetine Hcl] GI Intolerance    Gabapentin Mental Status Change     Pt states that this medication \"makes him feel foolish in his head\".     Remeron [Mirtazapine] Other (See Comments)     Excess sedation    Toradol [Ketorolac Tromethamine] GI Intolerance     Projectile vomiting     Latex Other (See Comments)     Latex allergy     Tape Rash       Objective   Objective     Vital Signs:           Physical Exam   Constitutional: Awake, alert, chronically ill appearing  Eyes: PERRLA, sclerae anicteric, no conjunctival injection  HENT: NCAT, mucous membranes moist  Neck: Supple, no thyromegaly, no lymphadenopathy, trachea midline  Respiratory: Clear to auscultation bilaterally, nonlabored respirations   Cardiovascular: RRR, no murmurs, rubs, or gallops, palpable pedal pulses bilaterally  Gastrointestinal: Positive bowel sounds, soft, nontender, nondistended  Musculoskeletal: No bilateral ankle edema, no clubbing or cyanosis to extremities  Psychiatric: Appropriate affect, cooperative  Neurologic: Oriented x 3, strength symmetric in all extremities, Cranial Nerves grossly intact to confrontation, speech clear  Skin: No rashes     Result Review:  I have personally reviewed the results from the time of this admission to 5/20/2024 16:27 EDT and agree with these findings:  []  Laboratory list / accordion  []  Microbiology  []  Radiology  []  EKG/Telemetry   []  Cardiology/Vascular   []  Pathology  []  Old records  []  Other:  Most notable findings include:       LAB RESULTS:      Lab 05/19/24  0940 05/17/24  0529 05/16/24  0430 05/15/24  0418 05/14/24  0431 05/14/24  0339   WBC 21.85* 15.02* 14.28* 21.06*  --  23.43*   HEMOGLOBIN 10.0* 9.3* 8.8* 8.6*  --  10.3*   HEMATOCRIT 32.3* 30.4* 28.1* 27.1*  --  32.3*   PLATELETS 326 306 298 286  -- "  320   NEUTROS ABS 19.05*  --   --  17.68*  --  20.24*   IMMATURE GRANS (ABS) 0.19*  --   --  0.20*  --  0.40*   LYMPHS ABS 1.02  --   --  1.34  --  0.73   MONOS ABS 1.37*  --   --  1.54*  --  1.91*   EOS ABS 0.16  --   --  0.20  --  0.08   MCV 94.7 95.9 95.9 94.4  --  91.8   SED RATE  --   --   --   --   --  98*   CRP  --   --   --   --  3.78*  --    PROCALCITONIN 1.03*  --   --   --  1.81*  --    LACTATE 1.3  --   --   --   --  1.9         [facility] 05/19/24 0940 05/17/24 0529 05/16/24  0430 05/15/24  0418 05/14/24  0431   SODIUM 136 137 137 133* 136   POTASSIUM 4.2 4.5 4.2 4.7 4.8   CHLORIDE 104 106 105 102 101   CO2 19.0* 18.0* 19.0* 18.0* 20.0*   ANION GAP 13.0 13.0 13.0 13.0 15.0   BUN 34* 35* 41* 42* 37*   CREATININE 4.73* 5.67* 5.57* 5.74* 5.23*   EGFR 12.2* 9.8* 10.0* 9.6* 10.8*   GLUCOSE 118* 95 92 93 113*   CALCIUM 9.2 8.5* 8.7 8.6 8.6   MAGNESIUM 1.5*  --   --   --   --    PHOSPHORUS  --   --   --  4.5  --          Lab 05/19/24  0940 05/15/24  0418 05/14/24  0431   TOTAL PROTEIN 7.3  --  6.8   ALBUMIN 3.6 2.9* 3.1*   GLOBULIN 3.7  --  3.7   ALT (SGPT) 9  --  10   AST (SGOT) 11  --  17   BILIRUBIN 0.2  --  0.4   ALK PHOS 98  --  74   LIPASE  --   --  17         Lab 05/19/24  0940 05/14/24  0431   PROBNP  --  857.5   HSTROP T 33* 34*                 UA          5/6/2024    12:06 5/14/2024    06:16 5/19/2024    10:36   Urinalysis   Squamous Epithelial Cells, UA 7-12  None Seen  None Seen    Specific [place], UA 1.012  1.012  1.013    Ketones, UA Negative  Negative  Negative    Blood, UA Trace  Small (1+)  Small (1+)    Leukocytes, UA Small (1+)  Large (3+)  Large (3+)    Nitrite, UA Negative  Negative  Negative    RBC, UA 3-5  3-5  3-5    WBC, UA 21-50  Too Numerous to Count  Too Numerous to Count    Bacteria, UA None Seen  1+  None Seen        Microbiology Results (last 10 days)       Procedure Component Value - Date/Time    Urine Culture - Urine, Urine, Clean Catch [[id number]]  (Normal) Collected: 05/19/24  1036    Lab Status: Final result Specimen: Urine, Clean Catch Updated: 05/20/24 1023     Urine Culture No growth    Blood Culture - Blood, Arm, Left [[id number]]  (Normal) Collected: 05/19/24 1018    Lab Status: Preliminary result Specimen: Blood from Arm, Left Updated: 05/20/24 1030     Blood Culture No growth at 24 hours    Narrative:      Less than seven (7) mL's of blood was collected.  Insufficient quantity may yield false negative results.    Blood Culture - Blood, Arm, Left [[id number]]  (Normal) Collected: 05/19/24 1014    Lab Status: Preliminary result Specimen: Blood from Arm, Left Updated: 05/20/24 1030     Blood Culture No growth at 24 hours    Narrative:      Less than seven (7) mL's of blood was collected.  Insufficient quantity may yield false negative results.    Respiratory Panel PCR w/COVID-19(SARS-CoV-2) [facility] In-House, NP Swab in UTM/VTM, 2 HR TAT - Swab, Nasopharynx [[id number]]  (Normal) Collected: 05/14/24 0808    Lab Status: Final result Specimen: Swab from Nasopharynx Updated: 05/14/24 0925     ADENOVIRUS, PCR Not Detected     Coronavirus 229E Not Detected     Coronavirus HKU1 Not Detected     Coronavirus NL63 Not Detected     Coronavirus OC43 Not Detected     COVID19 Not Detected     Human Metapneumovirus Not Detected     Human Rhinovirus/Enterovirus Not Detected     Influenza A PCR Not Detected     Influenza B PCR Not Detected     Parainfluenza Virus 1 Not Detected     Parainfluenza Virus 2 Not Detected     Parainfluenza Virus 3 Not Detected     Parainfluenza Virus 4 Not Detected     RSV, PCR Not Detected     Bordetella pertussis pcr Not Detected     Bordetella parapertussis PCR Not Detected     Chlamydophila pneumoniae PCR Not Detected     Mycoplasma pneumo by PCR Not Detected    Narrative:      In the setting of a positive respiratory panel with a viral infection PLUS a negative procalcitonin without other underlying concern for bacterial infection, consider observing off  antibiotics or discontinuation of antibiotics and continue supportive care. If the respiratory panel is positive for atypical bacterial infection (Bordetella pertussis, Chlamydophila pneumoniae, or Mycoplasma pneumoniae), consider antibiotic de-escalation to target atypical bacterial infection.    Urine Culture - Urine, Urine, Catheter [[id number]]  (Normal) Collected: 05/14/24 0616    Lab Status: Final result Specimen: Urine, Catheter Updated: 05/15/24 1120     Urine Culture No growth    Blood Culture - Blood, Arm, Left [[id number]]  (Normal) Collected: 05/14/24 0439    Lab Status: Final result Specimen: Blood from Arm, Left Updated: 05/19/24 0446     Blood Culture No growth at 5 days    COVID-19, FLU A/B, RSV PCR 1 HR TAT - Swab, Nasopharynx [[id number]]  (Normal) Collected: 05/14/24 0416    Lab Status: Final result Specimen: Swab from Nasopharynx Updated: 05/14/24 0513     COVID19 Not Detected     Influenza A PCR Not Detected     Influenza B PCR Not Detected     RSV, PCR Not Detected    Narrative:      Fact sheet for providers: https://www.fda.gov/media/462192/download    Fact sheet for patients: https://www.fda.gov/media/723352/download    Test performed by PCR.    Blood Culture - Blood, Arm, Right [[id number]]  (Normal) Collected: 05/14/24 0412    Lab Status: Final result Specimen: Blood from Arm, Right Updated: 05/19/24 0446     Blood Culture No growth at 5 days            CT Chest Without Contrast Diagnostic    Result Date: 5/19/2024  CT CHEST WO CONTRAST DIAGNOSTIC, CT ABDOMEN PELVIS WO CONTRAST Date of Exam: 5/19/2024 11:23 AM EDT Indication: soa. lung ca. being tx for sepsis.. Comparison: 5/6/2024 Technique: Axial CT images were obtained of the chest, abdomen, and pelvis without contrast administration.  Reconstructed coronal and sagittal images were also obtained. Automated exposure control and iterative construction methods were used. Findings: Stable biapical scarring more pronounced on the right again  noted. Subpleural blebs. Right-sided volume loss related to postsurgical changes. Soft tissue along the right suture line has decreased compared with the examination performed on 4/24/2024 but is stable compared with 5/6/2024 and decreased compared with 2/21/2024 possibly postsurgical changes. Residual underlying tumor not excluded. This measures 4.3 cm x 1.7 cm. Small right pleural effusion. Calcified granulomata. No pneumothorax. Calcified mediastinal and hilar lymph nodes consistent with prior granulomatous disease. 3.1 cm x 1.8 cm subcarinal lymph node. Severe atheromatous disease of the coronary vessels. Liver:No masses. No intrahepatic biliary ductal dilatation. Spleen:No masses. No perisplenic hematoma. Pancreas:No pancreatic masses. No evidence of pancreatitis. Gallbladder and common bile duct:No evidence of cholelithiasis. No evidence of cholecystitis. Adrenal glands:No adrenal masses Kidneys and ureters:No kidney stones. No renal masses.No calculi present within the ureters. Normal caliber ureters. Urinary bladder:No urinary bladder wall thickening. No bladder masses. Small bowel:Normal caliber small bowel. Large bowel: Diffuse large bowel wall thickening with surrounding infiltration of the fat consistent with colitis. Appendix: Normal GENITOURINARY: Normal prostate Ascites or pneumoperitoneum:None. Adenopathy: No adenopathy within the pelvis. Osseous structures: Degenerative changes of the hips and lumbar spine. No lytic or blastic disease is definitively identified. Other findings: None     Impression: Colitis. No abscess formation or perforation. Small right pleural effusion. Stable appearance of the right lower lobe density. Electronically Signed: Kam Foy MD  5/19/2024 11:43 AM EDT  Workstation ID: [id number]    CT Abdomen Pelvis Without Contrast    Result Date: 5/19/2024  CT CHEST WO CONTRAST DIAGNOSTIC, CT ABDOMEN PELVIS WO CONTRAST Date of Exam: 5/19/2024 11:23 AM EDT Indication: soa. lung ca.  being tx for sepsis.. Comparison: 5/6/2024 Technique: Axial CT images were obtained of the chest, abdomen, and pelvis without contrast administration.  Reconstructed coronal and sagittal images were also obtained. Automated exposure control and iterative construction methods were used. Findings: Stable biapical scarring more pronounced on the right again noted. Subpleural blebs. Right-sided volume loss related to postsurgical changes. Soft tissue along the right suture line has decreased compared with the examination performed on 4/24/2024 but is stable compared with 5/6/2024 and decreased compared with 2/21/2024 possibly postsurgical changes. Residual underlying tumor not excluded. This measures 4.3 cm x 1.7 cm. Small right pleural effusion. Calcified granulomata. No pneumothorax. Calcified mediastinal and hilar lymph nodes consistent with prior granulomatous disease. 3.1 cm x 1.8 cm subcarinal lymph node. Severe atheromatous disease of the coronary vessels. Liver:No masses. No intrahepatic biliary ductal dilatation. Spleen:No masses. No perisplenic hematoma. Pancreas:No pancreatic masses. No evidence of pancreatitis. Gallbladder and common bile duct:No evidence of cholelithiasis. No evidence of cholecystitis. Adrenal glands:No adrenal masses Kidneys and ureters:No kidney stones. No renal masses.No calculi present within the ureters. Normal caliber ureters. Urinary bladder:No urinary bladder wall thickening. No bladder masses. Small bowel:Normal caliber small bowel. Large bowel: Diffuse large bowel wall thickening with surrounding infiltration of the fat consistent with colitis. Appendix: Normal GENITOURINARY: Normal prostate Ascites or pneumoperitoneum:None. Adenopathy: No adenopathy within the pelvis. Osseous structures: Degenerative changes of the hips and lumbar spine. No lytic or blastic disease is definitively identified. Other findings: None     Impression: Colitis. No abscess formation or perforation. Small  right pleural effusion. Stable appearance of the right lower lobe density. Electronically Signed: Kam Foy MD  5/19/2024 11:43 AM EDT  Workstation ID: [id number]    XR Chest 1 View    Result Date: 5/19/2024  XR CHEST 1 VW Date of Exam: 5/19/2024 9:37 AM EDT Indication: Weak/Dizzy/AMS triage protocol Comparison: Chest radiograph 5/14/2024. Findings: Unchanged appearance of the right chest pacemaker and left chest port. Cardiomediastinal silhouette is unchanged. Pulmonary vascular congestion with mild diffuse interstitial thickening. Background of chronic/emphysematous changes. No dominant, focal consolidation. Small/moderate right pleural effusion. No sizable left pleural effusion. No pneumothorax. Osseous structures are unchanged.     Impression: Impression: Findings suggestive of mild pulmonary edema with small/moderate right pleural effusion. No dominant, focal consolidation. Finding superimposed upon a background of chronic/emphysematous changes. Electronically Signed: Marco Leahy MD  5/19/2024 9:58 AM EDT  Workstation ID: [id number]     Results for orders placed during the hospital encounter of 05/05/24    Adult Transesophageal Echo 3D (DAMIÁN) W/ Cont If Necessary Per Protocol    Interpretation Summary    Left ventricular systolic function is normal. Left ventricular ejection fraction appears to be 61 - 65%. Normal left ventricular cavity size noted. Left ventricular wall thickness is consistent with mild concentric hypertrophy. All left ventricular wall segments contract normally.    There is mild calcification of the aortic valve. The aortic valve appears trileaflet. Mild aortic valve regurgitation is present. No aortic valve stenosis is present. There is no evidence of an aortic valve mass is present.    There is mild calcification of the mitral valve. There is mild, bileaflet mitral valve thickening present. No evidence of a mitral valve mass is present. Mild to moderate mitral valve regurgitation is  present. No significant mitral valve stenosis is present.    The tricuspid valve is structurally normal with no significant stenosis present. There is no evidence of a mass on the tricuspid valve. Trace tricuspid valve regurgitation is present.    The pulmonic valve is grossly normal in structure. There is trace pulmonic valve regurgitation present.    No vegetation seen on atrial aspect of pacemaker leads.  The most distal aspects of the RV lead were not completely visualized however there was no apparent vegetation in the more proximal segment, adjacent to the tricuspid valve which appears to be functioning normally.      Assessment & Plan   Assessment & Plan       Weakness    Malignant neoplasm of lower lobe of right lung    Severe malnutrition    Leukocytosis    CAD (coronary artery disease)    Hypotension    Stage 4 chronic kidney disease    David Barfield is a 74 y.o. male with hx of NSCLC s/p neoadjuvant chemo, RLL lobectomy 1/2024, subsequent immunotherapy with Opdivo (last dose ~4/4/24), CAD, HTN, AV block s/p PPM, hypotension, emphysema, tobacco use, CKD 4-5, with 4 admissions since last month. He was discharged on 5/17 on cefepime however returned to ED 5/19 with ongoing soa and hypotension. He left AMA and was admitted directly today at the request of Dr. Derian Barry.     Hypotension, fluid responsive, resolved  -Unclear etiology, responded to fluids last admission. Will place on fluid again.  -Recent workup included (but not limited to), TTE (normal EF without significant valvular disease), appropriate cortisol, normal TSH  -Was taking amlodipine at home. Will d/c indefinitely.  -Start midodrine.     Recurrent treatment for presumed sepsis  --Dr. Barry to follow - had been on cefepime at home until today. Dr. Barry rec'd obs off abx and checking BK, CMV, adenovirus, fungitel, EBV, GI PCR, blood cultures from port/peripheral, histo.     CKD stage 4 with elevated Cr  --Significant other  again requesting Nephrology consultation. His creatinine is better than it was when he left last week.     NSCLC  --s/p neoadjuvant chemo, RLL lobectomy 1/2024, immunotherapy (last dose Opdivo 4/4/24)  --Follows with Dr. Ashley, further treatment on hold due to renal and infectious issues. Will ask her to see.     CAD  AV block s/p PPM  Hx HTN  --Continue statin, holding Amlodipine due to hypotension     Chronic anemia  --Monitor, stable overall     Emphysema  Tobacco abuse  --Trelegy substitute   --PRN nebs     Impaired glucose tolerance  --Last HbA1c 6.3%       DVT prophylaxis:  Medical DVT prophylaxis orders are present.          CODE STATUS:    Code Status and Medical Interventions:   Ordered at: 05/20/24 1626     Code Status (Patient has no pulse and is not breathing):    CPR (Attempt to Resuscitate)     Medical Interventions (Patient has pulse or is breathing):    Full Support       Expected Discharge   Expected discharge date/ time has not been documented.     Carey Huggins II, DO  05/20/24

## 2024-05-20 NOTE — OUTREACH NOTE
Call Center TCM Note      Flowsheet Row Responses   [facility] patient discharged from? [facility]   Does the patient have one of the following disease processes/diagnoses(primary or secondary)? Sepsis   TCM attempt successful? No  [no VR on file for PCP office]   Unsuccessful attempts Attempt 1   Call Status Left message            Chapis Watson RN    5/20/2024, 12:49 EDT

## 2024-05-21 LAB
ADV 40+41 DNA STL QL NAA+NON-PROBE: NOT DETECTED
ANION GAP SERPL CALCULATED.3IONS-SCNC: 14 MMOL/L (ref 5–15)
ASTRO TYP 1-8 RNA STL QL NAA+NON-PROBE: NOT DETECTED
BUN SERPL-MCNC: 37 MG/DL (ref 8–23)
BUN/CREAT SERPL: 8.2 (ref 7–25)
C CAYETANENSIS DNA STL QL NAA+NON-PROBE: NOT DETECTED
C COLI+JEJ+UPSA DNA STL QL NAA+NON-PROBE: NOT DETECTED
CALCIUM SPEC-SCNC: 8.4 MG/DL (ref 8.6–10.5)
CHLORIDE SERPL-SCNC: 103 MMOL/L (ref 98–107)
CO2 SERPL-SCNC: 17 MMOL/L (ref 22–29)
CREAT SERPL-MCNC: 4.49 MG/DL (ref 0.76–1.27)
CRYPTOSP DNA STL QL NAA+NON-PROBE: NOT DETECTED
DEPRECATED RDW RBC AUTO: 58 FL (ref 37–54)
E HISTOLYT DNA STL QL NAA+NON-PROBE: NOT DETECTED
EAEC PAA PLAS AGGR+AATA ST NAA+NON-PRB: NOT DETECTED
EC STX1+STX2 GENES STL QL NAA+NON-PROBE: NOT DETECTED
EGFRCR SERPLBLD CKD-EPI 2021: 12.9 ML/MIN/1.73
EPEC EAE GENE STL QL NAA+NON-PROBE: NOT DETECTED
ERYTHROCYTE [DISTWIDTH] IN BLOOD BY AUTOMATED COUNT: 16.9 % (ref 12.3–15.4)
ETEC LTA+ST1A+ST1B TOX ST NAA+NON-PROBE: NOT DETECTED
G LAMBLIA DNA STL QL NAA+NON-PROBE: NOT DETECTED
GLUCOSE SERPL-MCNC: 91 MG/DL (ref 65–99)
HCT VFR BLD AUTO: 28 % (ref 37.5–51)
HGB BLD-MCNC: 8.9 G/DL (ref 13–17.7)
MCH RBC QN AUTO: 29.5 PG (ref 26.6–33)
MCHC RBC AUTO-ENTMCNC: 31.8 G/DL (ref 31.5–35.7)
MCV RBC AUTO: 92.7 FL (ref 79–97)
NOROVIRUS GI+II RNA STL QL NAA+NON-PROBE: NOT DETECTED
P SHIGELLOIDES DNA STL QL NAA+NON-PROBE: NOT DETECTED
PLATELET # BLD AUTO: 288 10*3/MM3 (ref 140–450)
PMV BLD AUTO: 8.5 FL (ref 6–12)
POTASSIUM SERPL-SCNC: 4.3 MMOL/L (ref 3.5–5.2)
RBC # BLD AUTO: 3.02 10*6/MM3 (ref 4.14–5.8)
RVA RNA STL QL NAA+NON-PROBE: NOT DETECTED
S ENT+BONG DNA STL QL NAA+NON-PROBE: NOT DETECTED
SAPO I+II+IV+V RNA STL QL NAA+NON-PROBE: NOT DETECTED
SHIGELLA SP+EIEC IPAH ST NAA+NON-PROBE: NOT DETECTED
SODIUM SERPL-SCNC: 134 MMOL/L (ref 136–145)
V CHOL+PARA+VUL DNA STL QL NAA+NON-PROBE: NOT DETECTED
V CHOLERAE DNA STL QL NAA+NON-PROBE: NOT DETECTED
WBC NRBC COR # BLD AUTO: 17.18 10*3/MM3 (ref 3.4–10.8)
Y ENTEROCOL DNA STL QL NAA+NON-PROBE: NOT DETECTED

## 2024-05-21 PROCEDURE — 94664 DEMO&/EVAL PT USE INHALER: CPT

## 2024-05-21 PROCEDURE — 25010000002 HEPARIN (PORCINE) PER 1000 UNITS: Performed by: INTERNAL MEDICINE

## 2024-05-21 PROCEDURE — 99222 1ST HOSP IP/OBS MODERATE 55: CPT | Performed by: INTERNAL MEDICINE

## 2024-05-21 PROCEDURE — 97165 OT EVAL LOW COMPLEX 30 MIN: CPT

## 2024-05-21 PROCEDURE — 87102 FUNGUS ISOLATION CULTURE: CPT | Performed by: INTERNAL MEDICINE

## 2024-05-21 PROCEDURE — 87206 SMEAR FLUORESCENT/ACID STAI: CPT | Performed by: INTERNAL MEDICINE

## 2024-05-21 PROCEDURE — 85027 COMPLETE CBC AUTOMATED: CPT | Performed by: INTERNAL MEDICINE

## 2024-05-21 PROCEDURE — 87507 IADNA-DNA/RNA PROBE TQ 12-25: CPT | Performed by: INTERNAL MEDICINE

## 2024-05-21 PROCEDURE — 97162 PT EVAL MOD COMPLEX 30 MIN: CPT

## 2024-05-21 PROCEDURE — 86480 TB TEST CELL IMMUN MEASURE: CPT | Performed by: INTERNAL MEDICINE

## 2024-05-21 PROCEDURE — 25810000003 SODIUM CHLORIDE 0.9 % SOLUTION: Performed by: INTERNAL MEDICINE

## 2024-05-21 PROCEDURE — 97530 THERAPEUTIC ACTIVITIES: CPT

## 2024-05-21 PROCEDURE — 87116 MYCOBACTERIA CULTURE: CPT | Performed by: INTERNAL MEDICINE

## 2024-05-21 PROCEDURE — 80048 BASIC METABOLIC PNL TOTAL CA: CPT | Performed by: INTERNAL MEDICINE

## 2024-05-21 PROCEDURE — 99232 SBSQ HOSP IP/OBS MODERATE 35: CPT | Performed by: INTERNAL MEDICINE

## 2024-05-21 PROCEDURE — 94799 UNLISTED PULMONARY SVC/PX: CPT

## 2024-05-21 RX ORDER — SODIUM BICARBONATE 650 MG/1
650 TABLET ORAL 3 TIMES DAILY
Status: DISCONTINUED | OUTPATIENT
Start: 2024-05-21 | End: 2024-05-22

## 2024-05-21 RX ORDER — MEGESTROL ACETATE 20 MG/1
20 TABLET ORAL DAILY
Status: DISCONTINUED | OUTPATIENT
Start: 2024-05-21 | End: 2024-05-23 | Stop reason: HOSPADM

## 2024-05-21 RX ORDER — FERROUS SULFATE 325(65) MG
325 TABLET ORAL
Status: DISCONTINUED | OUTPATIENT
Start: 2024-05-21 | End: 2024-05-23 | Stop reason: HOSPADM

## 2024-05-21 RX ADMIN — MEGESTROL ACETATE 20 MG: 20 TABLET ORAL at 14:19

## 2024-05-21 RX ADMIN — PRAVASTATIN SODIUM 80 MG: 40 TABLET ORAL at 20:25

## 2024-05-21 RX ADMIN — TIOTROPIUM BROMIDE INHALATION SPRAY 2 PUFF: 3.12 SPRAY, METERED RESPIRATORY (INHALATION) at 10:51

## 2024-05-21 RX ADMIN — BUDESONIDE AND FORMOTEROL FUMARATE DIHYDRATE 2 PUFF: 160; 4.5 AEROSOL RESPIRATORY (INHALATION) at 21:04

## 2024-05-21 RX ADMIN — SODIUM CHLORIDE 125 ML/HR: 9 INJECTION, SOLUTION INTRAVENOUS at 01:42

## 2024-05-21 RX ADMIN — HEPARIN SODIUM 5000 UNITS: 5000 INJECTION INTRAVENOUS; SUBCUTANEOUS at 04:26

## 2024-05-21 RX ADMIN — BUDESONIDE AND FORMOTEROL FUMARATE DIHYDRATE 2 PUFF: 160; 4.5 AEROSOL RESPIRATORY (INHALATION) at 10:50

## 2024-05-21 RX ADMIN — PANTOPRAZOLE SODIUM 40 MG: 40 TABLET, DELAYED RELEASE ORAL at 04:26

## 2024-05-21 RX ADMIN — HEPARIN SODIUM 5000 UNITS: 5000 INJECTION INTRAVENOUS; SUBCUTANEOUS at 14:02

## 2024-05-21 RX ADMIN — SODIUM BICARBONATE 650 MG TABLET 650 MG: at 20:25

## 2024-05-21 RX ADMIN — Medication 10 ML: at 20:27

## 2024-05-21 RX ADMIN — FERROUS SULFATE TAB 325 MG (65 MG ELEMENTAL FE) 325 MG: 325 (65 FE) TAB at 08:44

## 2024-05-21 RX ADMIN — SODIUM BICARBONATE 650 MG TABLET 650 MG: at 14:18

## 2024-05-21 NOTE — THERAPY EVALUATION
Patient Name: David Barfield  : 1950    MRN: [id number]                              Today's Date: 2024       Admit Date: 2024    Visit Dx: No diagnosis found.  Patient Active Problem List   Diagnosis    High degree atrioventricular block    Bradycardia, sinus    Chronic hypotension    PVC's (premature ventricular contractions)    Presence of cardiac pacemaker    Mixed hyperlipidemia    Centrilobular emphysema    Nodule of lower lobe of right lung    Mediastinal adenopathy    Cough    Tobacco abuse    Bronchogenic cancer of right lung    Malignant neoplasm of lower lobe of right lung    Primary hypertension    GERD without esophagitis    Septic shock    Acute pyelonephritis    ARACELI (acute kidney injury)    Hypokalemia    Severe malnutrition    Leukocytosis    CAD (coronary artery disease)    Hypotension    Stage 4 chronic kidney disease    Moderate malnutrition    Weakness     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    Coronary artery disease 2019    Diabetes mellitus Borderline    Emphysema/COPD     Erectile disorder     GERD (gastroesophageal reflux disease)     History of chemotherapy     Hyperlipidemia     Hypertension 2019    Lung nodule     Mumps     Mumps     Pruritus     after bath    Slow to wake up after anesthesia     Stage 5 chronic kidney disease not on chronic dialysis 2024    Wears dentures     upper only    Wears hearing aid in both ears     usually only wears right     Past Surgical History:   Procedure Laterality Date    BONE BIOPSY      broken bone surgery in his face    BRONCHOSCOPY THORACOTOMY Right 2024    Procedure: THORACOTOMY FOR LOWER LOBECTOMY AND MEDISTINAL LYMPH NODE DISSECTION RIGHT;  Surgeon: Joey Patel MD;  Location: [facility];  Service: Cardiothoracic;  Laterality: Right;    BRONCHOSCOPY WITH ION ROBOTIC ASSIST N/A 09/15/2023     Procedure: BRONCHOSCOPY NAVIGATION WITH ENDOBRONCHIAL ULTRASOUND AND ION ROBOT;  Surgeon: Octaviano Sampson MD;  Location:  [facility];  Service: Robotics - Pulmonary;  Laterality: N/A;  ion #6 - 0032  - 0015  Cath guide 0061    EBUS balloon removed and intact    CARDIAC ELECTROPHYSIOLOGY PROCEDURE N/A 08/17/2021    Procedure: Pacemaker DC new;  Surgeon: Kayy Box MD;  Location:  [facility];  Service: Cardiology;  Laterality: N/A;    FACIAL FRACTURE SURGERY      LYMPH NODE BIOPSY  2023    PACEMAKER IMPLANTATION        General Information       Row Name 05/21/24 1032          Physical Therapy Time and Intention    Document Type evaluation  -ND     Mode of Treatment physical therapy  -ND       Row Name 05/21/24 1032          General Information    Patient Profile Reviewed yes  -ND     Prior Level of Function independent:;all household mobility;community mobility;gait;driving;transfer;bed mobility;using stairs  -ND     Existing Precautions/Restrictions fall;orthostatic hypotension;other (see comments)  Monitor BP with mobility.  -ND     Barriers to Rehab hearing deficit  -ND       Row Name 05/21/24 1032          Living Environment    People in Home significant other  -ND       Row Name 05/21/24 1032          Home Main Entrance    Number of Stairs, Main Entrance none  -ND     Stair Railings, Main Entrance none  -ND       Row Name 05/21/24 1032          Stairs Within Home, Primary    Stairs, Within Home, Primary Needs met on 1st floor. Chair lift if needed to second floor.  -ND     Number of Stairs, Within Home, Primary twelve  -ND       Row Name 05/21/24 1032          Cognition    Orientation Status (Cognition) oriented x 4  -ND       Row Name 05/21/24 1032          Safety Issues, Functional Mobility    Safety Issues Affecting Function (Mobility) awareness of need for assistance;safety precaution awareness  -ND     Impairments Affecting Function (Mobility) endurance/activity  "tolerance  -ND               User Key  (r) = Recorded By, (t) = Taken By, (c) = Cosigned By      Initials Name Provider Type    Suzanne Umana, PT Physical Therapist                   Mobility       Row Name 05/21/24 1033          Bed Mobility    Bed Mobility supine-sit;sit-supine  -ND     Supine-Sit [place] (Bed Mobility) modified independence  -ND     Sit-Supine [place] (Bed Mobility) modified independence  -ND     Assistive Device (Bed Mobility) bed rails;head of bed elevated  -ND     Comment, (Bed Mobility) Performs without issue but reports feeling \"off\" with sitting up and standing, BP taken t/o session.  -ND       Row Name 05/21/24 1033          Sit-Stand Transfer    Sit-Stand [place] (Transfers) supervision  -ND     Comment, (Sit-Stand Transfer) x2 from EOB, no AD.  -ND       Row Name 05/21/24 1033          Gait/Stairs (Locomotion)    [place] Level (Gait) contact guard  -ND     Distance in Feet (Gait) 2  -ND     Comment, (Gait/Stairs) Pt taking side steps toward HOB with CGA for safety due to orthostatic hypotension.  -ND               User Key  (r) = Recorded By, (t) = Taken By, (c) = Cosigned By      Initials Name Provider Type    Suzanne Umana, PT Physical Therapist                   Obj/Interventions       Row Name 05/21/24 1037          Range of Motion Comprehensive    General Range of Motion bilateral lower extremity ROM WFL  -ND       Row Name 05/21/24 1037          Strength Comprehensive (MMT)    General Manual Muscle Testing (MMT) Assessment lower extremity strength deficits identified  -ND     Comment, General Manual Muscle Testing (MMT) Assessment BLE MMT 4/5.  -ND       Row Name 05/21/24 1037          Motor Skills    Therapeutic Exercise hip  -ND       Row Name 05/21/24 1037          Hip (Therapeutic Exercise)    Hip (Therapeutic Exercise) strengthening exercise  -ND     Hip Strengthening (Therapeutic Exercise) bilateral;marching while seated;10 repetitions  -ND "       Row Name 05/21/24 1037          Balance    Balance Assessment sitting static balance;sitting dynamic balance;sit to stand dynamic balance;standing static balance;standing dynamic balance  -ND     Static Sitting Balance independent  -ND     Dynamic Sitting Balance independent  -ND     Position, Sitting Balance unsupported;sitting edge of bed  -ND     Sit to Stand Dynamic Balance supervision  -ND     Static Standing Balance supervision  -ND     Dynamic Standing Balance contact guard  -ND     Position/Device Used, Standing Balance unsupported  -ND     Balance Interventions sitting;standing;sit to stand;supported;static;dynamic  -ND     Comment, Balance Pt does not demo any gross instabilities or LOB  but CGA provided as precaution due to low BP readings.  -ND       Row Name 05/21/24 1037          Sensory Assessment (Somatosensory)    Sensory Assessment (Somatosensory) LE sensation intact  -ND               User Key  (r) = Recorded By, (t) = Taken By, (c) = Cosigned By      Initials Name Provider Type    ND Suzanne Thacker, PT Physical Therapist                   Goals/Plan       Row Name 05/21/24 1042          Bed Mobility Goal 1 (PT)    Activity/Assistive Device (Bed Mobility Goal 1, PT) sit to supine/supine to sit  -ND     [place] Level/Cues Needed (Bed Mobility Goal 1, PT) independent  -ND     Time Frame (Bed Mobility Goal 1, PT) long term goal (LTG);10 days  -ND       Row Name 05/21/24 1042          Transfer Goal 1 (PT)    Activity/Assistive Device (Transfer Goal 1, PT) sit-to-stand/stand-to-sit;bed-to-chair/chair-to-bed  -ND     [place] Level/Cues Needed (Transfer Goal 1, PT) independent  -ND     Time Frame (Transfer Goal 1, PT) long term goal (LTG);10 days  -ND       Row Name 05/21/24 1042          Gait Training Goal 1 (PT)    Activity/Assistive Device (Gait Training Goal 1, PT) gait (walking locomotion);increase endurance/gait distance;decrease fall risk  -ND     [place] Level (Gait  Training Goal 1, PT) independent  -ND     Distance (Gait Training Goal 1, PT) 500  -ND     Time Frame (Gait Training Goal 1, PT) long term goal (LTG);10 days  -ND       Row Name 05/21/24 1042          Therapy Assessment/Plan (PT)    Planned Therapy Interventions (PT) balance training;bed mobility training;gait training;home exercise program;patient/family education;transfer training;postural re-education;strengthening;ROM (range of motion)  -ND               User Key  (r) = Recorded By, (t) = Taken By, (c) = Cosigned By      Initials Name Provider Type    ND Suzanne Thacker, PT Physical Therapist                   Clinical Impression       Row Name 05/21/24 1038          Pain    Pretreatment Pain Rating 0/10 - no pain  -ND     Posttreatment Pain Rating 0/10 - no pain  -ND     Pain Intervention(s) Repositioned;Ambulation/increased activity  -ND       Row Name 05/21/24 1038          Plan of Care Review    Plan of Care Reviewed With patient;significant other  -ND     Outcome Evaluation PT evaluation completed. Pt presents slightly below baseline levels of mobility for strength and activity tolerance and is limited in functional mobility by orthostatic hypotension warranting IP PT services to address deficits and facilitate return to PLOF. Recommend home with assist following d/c.  -ND       Row Name 05/21/24 1038          Therapy Assessment/Plan (PT)    Patient/Family Therapy Goals Statement (PT) Return to PLOF.  -ND     Rehab Potential (PT) good, to achieve stated therapy goals  -ND     Criteria for Skilled Interventions Met (PT) yes;skilled treatment is necessary;meets criteria  -ND     Therapy Frequency (PT) daily  -ND       Row Name 05/21/24 1038          Vital Signs    Pre Systolic BP Rehab 132  93/62 with sitting EOB.  -ND     Pre Treatment Diastolic BP 69  -ND     Intra Systolic BP Rehab 87  standing- RN aware.  -ND     Intra Treatment Diastolic BP 53  -ND     Post Systolic BP Rehab 109  returned to supine.   -ND     Post Treatment Diastolic BP 63  -ND     Pretreatment Heart Rate (beats/min) 70  -ND     Posttreatment Heart Rate (beats/min) 70  -ND     Pre SpO2 (%) 98  -ND     O2 Delivery Pre Treatment room air  -ND     O2 Delivery Intra Treatment room air  -ND     Post SpO2 (%) 98  -ND     O2 Delivery Post Treatment room air  -ND     Pre Patient Position Supine  -ND     Intra Patient Position Standing  -ND     Post Patient Position Supine  -ND       Row Name 05/21/24 1038          Positioning and Restraints    Pre-Treatment Position in bed  -ND     Post Treatment Position bed  -ND     In Bed notified nsg;supine;with family/caregiver;call light within reach;exit alarm on;encouraged to call for assist;side rails up x2  -ND               User Key  (r) = Recorded By, (t) = Taken By, (c) = Cosigned By      Initials Name Provider Type    Suzanne Umana, PT Physical Therapist                   Outcome Measures       Row Name 05/21/24 1042 05/21/24 0800       How much help from another person do you currently need...    Turning from your back to your side while in flat bed without using bedrails? 4  -ND 4  -SW    Moving from lying on back to sitting on the side of a flat bed without bedrails? 4  -ND 4  -SW    Moving to and from a bed to a chair (including a wheelchair)? 3  -ND 3  -SW    Standing up from a chair using your arms (e.g., wheelchair, bedside chair)? 3  -ND 3  -SW    Climbing 3-5 steps with a railing? 3  -ND 3  -SW    To walk in hospital room? 3  -ND 3  -SW    AM-PAC 6 Clicks Score (PT) 20  -ND 20  -SW    Highest Level of Mobility Goal 6 --> Walk 10 steps or more  -ND 6 --> Walk 10 steps or more  -SW      Row Name 05/21/24 1042 05/21/24 1010       Functional Assessment    Outcome Measure Options AM-PAC 6 Clicks Basic Mobility (PT)  -ND AM-PAC 6 Clicks Daily Activity (OT)  -AC              User Key  (r) = Recorded By, (t) = Taken By, (c) = Cosigned By      Initials Name Provider Type    AC Gely Stahl, OT  Occupational Therapist    Yamileth Bal, RN Registered Nurse    Suzanne Umana, PT Physical Therapist                                 Physical Therapy Education       Title: PT OT SLP Therapies (In Progress)       Topic: Physical Therapy (In Progress)       Point: Mobility training (In Progress)       Learning Progress Summary             Patient Acceptance, E, NR by ND at 5/21/2024 1043                         Point: Home exercise program (Not Started)       Learner Progress:  Not documented in this visit.              Point: Body mechanics (In Progress)       Learning Progress Summary             Patient Acceptance, E, NR by ND at 5/21/2024 1043                         Point: Precautions (In Progress)       Learning Progress Summary             Patient Acceptance, E, NR by ND at 5/21/2024 1043                                         User Key       Initials Effective Dates Name Provider Type Discipline    ND 11/16/23 -  Suzanne Thacker PT Physical Therapist PT                  PT Recommendation and Plan  Planned Therapy Interventions (PT): balance training, bed mobility training, gait training, home exercise program, patient/family education, transfer training, postural re-education, strengthening, ROM (range of motion)  Plan of Care Reviewed With: patient, significant other  Outcome Evaluation: PT evaluation completed. Pt presents slightly below baseline levels of mobility for strength and activity tolerance and is limited in functional mobility by orthostatic hypotension warranting IP PT services to address deficits and facilitate return to PLOF. Recommend home with assist following d/c.     Time Calculation:   PT Evaluation Complexity  History, PT Evaluation Complexity: 3 or more personal factors and/or comorbidities  Examination of Body Systems (PT Eval Complexity): total of 4 or more elements  Clinical Presentation (PT Evaluation Complexity): evolving  Clinical Decision Making (PT Evaluation  Complexity): moderate complexity  Overall Complexity (PT Evaluation Complexity): moderate complexity     PT Charges       Row Name 05/21/24 1043             Time Calculation    Start Time 0922  -ND      PT Received On 05/21/24  -ND      PT Goal Re-Cert Due Date 05/31/24  -ND         Timed Charges    25681 - PT Therapeutic Activity Minutes 8  -ND         Untimed Charges    PT Eval/Re-eval Minutes 31  -ND         Total Minutes    Timed Charges Total Minutes 8  -ND      Untimed Charges Total Minutes 31  -ND       Total Minutes 39  -ND                User Key  (r) = Recorded By, (t) = Taken By, (c) = Cosigned By      Initials Name Provider Type    ND Suzanne Thacker, PT Physical Therapist                  Therapy Charges for Today       Code Description Service Date Service Provider Modifiers Qty    85736348287 HC PT THERAPEUTIC ACT EA 15 MIN 5/21/2024 Suzanne Thacker, PT GP 1    31275018790 HC PT EVAL MOD COMPLEXITY 3 5/21/2024 Suzanne Thacker, PT GP 1            PT G-Codes  Outcome Measure Options: AM-PAC 6 Clicks Basic Mobility (PT)  AM-PAC 6 Clicks Score (PT): 20  AM-PAC 6 Clicks Score (OT): 21  PT Discharge Summary  Anticipated Discharge Disposition (PT): home with assist    Suzanne Thacker PT  5/21/2024

## 2024-05-21 NOTE — PROGRESS NOTES
[facility] Medicine Services  PROGRESS NOTE    Patient Name: David Barfield  : 1950  MRN: [id number]    Date of Admission: 2024  Primary Care Physician: Hayley Castellanos APRN    Subjective   Subjective     CC:f/u weakness      HPI: No acute events overnight, patient stated he feels better this morning, BP is improved      Objective   Objective     Vital Signs:   Temp:  [97.2 °F (36.2 °C)-99.7 °F (37.6 °C)] 98 °F (36.7 °C)  Heart Rate:  [70-77] 70  Resp:  [16-20] 16  BP: (110-124)/(63-72) 124/66     Physical Exam:  Constitutional: No acute distress, awake, alert  HENT: NCAT, mucous membranes moist  Respiratory: Clear to auscultation bilaterally, respiratory effort normal   Cardiovascular: RRR, no murmurs, rubs, or gallops  Gastrointestinal: Positive bowel sounds, soft, nontender, nondistended  Musculoskeletal: No bilateral ankle edema  Psychiatric: Appropriate affect, cooperative  Neurologic: Oriented x 3, strength symmetric in all extremities, Cranial Nerves grossly intact to confrontation, speech clear  Skin: No rashes     Results Reviewed:  LAB RESULTS:      Lab 24  0448 24  1820 24  0940 24  0529 24  0430 05/15/24  0418   WBC 17.18* 22.57* 21.85* 15.02* 14.28* 21.06*   HEMOGLOBIN 8.9* 9.5* 10.0* 9.3* 8.8* 8.6*   HEMATOCRIT 28.0* 29.8* 32.3* 30.4* 28.1* 27.1*   PLATELETS 288 324 326 306 298 286   NEUTROS ABS  --  19.28* 19.05*  --   --  17.68*   IMMATURE GRANS (ABS)  --  0.31* 0.19*  --   --  0.20*   LYMPHS ABS  --  0.94 1.02  --   --  1.34   MONOS ABS  --  1.82* 1.37*  --   --  1.54*   EOS ABS  --  0.16 0.16  --   --  0.20   MCV 92.7 91.7 94.7 95.9 95.9 94.4   PROCALCITONIN  --   --  1.03*  --   --   --    LACTATE  --   --  1.3  --   --   --          Lab 24  0448 24  1820 24  0940 24  0529 24  0430 05/15/24  0418   SODIUM 134* 133* 136 137 137 133*   POTASSIUM 4.3 4.4 4.2 4.5 4.2 4.7   CHLORIDE 103 101 104  106 105 102   CO2 17.0* 17.0* 19.0* 18.0* 19.0* 18.0*   ANION GAP 14.0 15.0 13.0 13.0 13.0 13.0   BUN 37* 36* 34* 35* 41* 42*   CREATININE 4.49* 4.57* 4.73* 5.67* 5.57* 5.74*   EGFR 12.9* 12.7* 12.2* 9.8* 10.0* 9.6*   GLUCOSE 91 108* 118* 95 92 93   CALCIUM 8.4* 8.9 9.2 8.5* 8.7 8.6   MAGNESIUM  --   --  1.5*  --   --   --    PHOSPHORUS  --   --   --   --   --  4.5         Lab 05/20/24  1820 05/19/24  0940 05/15/24  0418   TOTAL PROTEIN 7.6 7.3  --    ALBUMIN 3.4* 3.6 2.9*   GLOBULIN 4.2 3.7  --    ALT (SGPT) 9 9  --    AST (SGOT) 13 11  --    BILIRUBIN 0.2 0.2  --    ALK PHOS 101 98  --          Lab 05/19/24  0940   HSTROP T 33*                 Brief Urine Lab Results  (Last result in the past 365 days)        Color   Clarity   Blood   Leuk Est   Nitrite   Protein   CREAT   Urine HCG        05/19/24 1036 Yellow   Cloudy   Small (1+)   Large (3+)   Negative   100 mg/dL (2+)                   Microbiology Results Abnormal       None            CT Chest Without Contrast Diagnostic    Result Date: 5/19/2024  CT CHEST WO CONTRAST DIAGNOSTIC, CT ABDOMEN PELVIS WO CONTRAST Date of Exam: 5/19/2024 11:23 AM EDT Indication: soa. lung ca. being tx for sepsis.. Comparison: 5/6/2024 Technique: Axial CT images were obtained of the chest, abdomen, and pelvis without contrast administration.  Reconstructed coronal and sagittal images were also obtained. Automated exposure control and iterative construction methods were used. Findings: Stable biapical scarring more pronounced on the right again noted. Subpleural blebs. Right-sided volume loss related to postsurgical changes. Soft tissue along the right suture line has decreased compared with the examination performed on 4/24/2024 but is stable compared with 5/6/2024 and decreased compared with 2/21/2024 possibly postsurgical changes. Residual underlying tumor not excluded. This measures 4.3 cm x 1.7 cm. Small right pleural effusion. Calcified granulomata. No pneumothorax. Calcified  mediastinal and hilar lymph nodes consistent with prior granulomatous disease. 3.1 cm x 1.8 cm subcarinal lymph node. Severe atheromatous disease of the coronary vessels. Liver:No masses. No intrahepatic biliary ductal dilatation. Spleen:No masses. No perisplenic hematoma. Pancreas:No pancreatic masses. No evidence of pancreatitis. Gallbladder and common bile duct:No evidence of cholelithiasis. No evidence of cholecystitis. Adrenal glands:No adrenal masses Kidneys and ureters:No kidney stones. No renal masses.No calculi present within the ureters. Normal caliber ureters. Urinary bladder:No urinary bladder wall thickening. No bladder masses. Small bowel:Normal caliber small bowel. Large bowel: Diffuse large bowel wall thickening with surrounding infiltration of the fat consistent with colitis. Appendix: Normal GENITOURINARY: Normal prostate Ascites or pneumoperitoneum:None. Adenopathy: No adenopathy within the pelvis. Osseous structures: Degenerative changes of the hips and lumbar spine. No lytic or blastic disease is definitively identified. Other findings: None     Impression: Colitis. No abscess formation or perforation. Small right pleural effusion. Stable appearance of the right lower lobe density. Electronically Signed: Kam Foy MD  5/19/2024 11:43 AM EDT  Workstation ID: [id number]    CT Abdomen Pelvis Without Contrast    Result Date: 5/19/2024  CT CHEST WO CONTRAST DIAGNOSTIC, CT ABDOMEN PELVIS WO CONTRAST Date of Exam: 5/19/2024 11:23 AM EDT Indication: soa. lung ca. being tx for sepsis.. Comparison: 5/6/2024 Technique: Axial CT images were obtained of the chest, abdomen, and pelvis without contrast administration.  Reconstructed coronal and sagittal images were also obtained. Automated exposure control and iterative construction methods were used. Findings: Stable biapical scarring more pronounced on the right again noted. Subpleural blebs. Right-sided volume loss related to postsurgical changes. Soft  tissue along the right suture line has decreased compared with the examination performed on 4/24/2024 but is stable compared with 5/6/2024 and decreased compared with 2/21/2024 possibly postsurgical changes. Residual underlying tumor not excluded. This measures 4.3 cm x 1.7 cm. Small right pleural effusion. Calcified granulomata. No pneumothorax. Calcified mediastinal and hilar lymph nodes consistent with prior granulomatous disease. 3.1 cm x 1.8 cm subcarinal lymph node. Severe atheromatous disease of the coronary vessels. Liver:No masses. No intrahepatic biliary ductal dilatation. Spleen:No masses. No perisplenic hematoma. Pancreas:No pancreatic masses. No evidence of pancreatitis. Gallbladder and common bile duct:No evidence of cholelithiasis. No evidence of cholecystitis. Adrenal glands:No adrenal masses Kidneys and ureters:No kidney stones. No renal masses.No calculi present within the ureters. Normal caliber ureters. Urinary bladder:No urinary bladder wall thickening. No bladder masses. Small bowel:Normal caliber small bowel. Large bowel: Diffuse large bowel wall thickening with surrounding infiltration of the fat consistent with colitis. Appendix: Normal GENITOURINARY: Normal prostate Ascites or pneumoperitoneum:None. Adenopathy: No adenopathy within the pelvis. Osseous structures: Degenerative changes of the hips and lumbar spine. No lytic or blastic disease is definitively identified. Other findings: None     Impression: Colitis. No abscess formation or perforation. Small right pleural effusion. Stable appearance of the right lower lobe density. Electronically Signed: Kam Foy MD  5/19/2024 11:43 AM EDT  Workstation ID: [id number]    XR Chest 1 View    Result Date: 5/19/2024  XR CHEST 1 VW Date of Exam: 5/19/2024 9:37 AM EDT Indication: Weak/Dizzy/AMS triage protocol Comparison: Chest radiograph 5/14/2024. Findings: Unchanged appearance of the right chest pacemaker and left chest port. Cardiomediastinal  silhouette is unchanged. Pulmonary vascular congestion with mild diffuse interstitial thickening. Background of chronic/emphysematous changes. No dominant, focal consolidation. Small/moderate right pleural effusion. No sizable left pleural effusion. No pneumothorax. Osseous structures are unchanged.     Impression: Impression: Findings suggestive of mild pulmonary edema with small/moderate right pleural effusion. No dominant, focal consolidation. Finding superimposed upon a background of chronic/emphysematous changes. Electronically Signed: Marco Leahy MD  5/19/2024 9:58 AM EDT  Workstation ID: [id number]     Results for orders placed during the hospital encounter of 05/05/24    Adult Transesophageal Echo 3D (DAMIÁN) W/ Cont If Necessary Per Protocol    Interpretation Summary    Left ventricular systolic function is normal. Left ventricular ejection fraction appears to be 61 - 65%. Normal left ventricular cavity size noted. Left ventricular wall thickness is consistent with mild concentric hypertrophy. All left ventricular wall segments contract normally.    There is mild calcification of the aortic valve. The aortic valve appears trileaflet. Mild aortic valve regurgitation is present. No aortic valve stenosis is present. There is no evidence of an aortic valve mass is present.    There is mild calcification of the mitral valve. There is mild, bileaflet mitral valve thickening present. No evidence of a mitral valve mass is present. Mild to moderate mitral valve regurgitation is present. No significant mitral valve stenosis is present.    The tricuspid valve is structurally normal with no significant stenosis present. There is no evidence of a mass on the tricuspid valve. Trace tricuspid valve regurgitation is present.    The pulmonic valve is grossly normal in structure. There is trace pulmonic valve regurgitation present.    No vegetation seen on atrial aspect of pacemaker leads.  The most distal aspects of the RV  lead were not completely visualized however there was no apparent vegetation in the more proximal segment, adjacent to the tricuspid valve which appears to be functioning normally.      Current medications:  Scheduled Meds:budesonide-formoterol, 2 puff, Inhalation, BID - RT   And  tiotropium bromide monohydrate, 2 puff, Inhalation, Daily - RT  heparin (porcine), 5,000 Units, Subcutaneous, Q8H  pantoprazole, 40 mg, Oral, Q AM  pravastatin, 80 mg, Oral, Nightly  sodium chloride, 10 mL, Intravenous, Q12H      Continuous Infusions:   PRN Meds:.  acetaminophen **OR** acetaminophen **OR** acetaminophen    senna-docusate sodium **AND** polyethylene glycol **AND** bisacodyl **AND** bisacodyl    Calcium Replacement - Follow Nurse / BPA Driven Protocol    HYDROcodone-acetaminophen    Magnesium Standard Dose Replacement - Follow Nurse / BPA Driven Protocol    midodrine    ondansetron ODT **OR** ondansetron    Phosphorus Replacement - Follow Nurse / BPA Driven Protocol    Potassium Replacement - Follow Nurse / BPA Driven Protocol    sodium chloride    sodium chloride    Assessment & Plan   Assessment & Plan     Active Hospital Problems    Diagnosis  POA    **Weakness [R53.1]  Yes    Hypotension [I95.9]  Yes    Stage 4 chronic kidney disease [N18.4]  Yes    CAD (coronary artery disease) [I25.10]  Yes    Leukocytosis [D72.829]  Yes    Severe malnutrition [E43]  Yes    Malignant neoplasm of lower lobe of right lung [C34.31]  Yes      Resolved Hospital Problems   No resolved problems to display.        Brief Hospital Course to date:  David Barfield is a 74 y.o. male with hx of NSCLC s/p neoadjuvant chemo, RLL lobectomy 1/2024, subsequent immunotherapy with Opdivo (last dose ~4/4/24), CAD, HTN, AV block s/p PPM, hypotension, emphysema, tobacco use, CKD 4-5, with 4 admissions since last month. He was discharged on 5/17 on cefepime however returned to ED 5/19 with ongoing soa and hypotension. He left AMA and was admitted directly  today at the request of Dr. Derian Barry.    This patient's problems and plans were partially entered by my partner and updated as appropriate by me 05/21/24.      Hypotension, fluid responsive, resolved  -BP much improved s/p IVF, etiology remains unknown  -Recent workup included (but not limited to), TTE (normal EF without significant valvular disease), appropriate cortisol, normal TSH  -d/c home amlodipine indefinitely, continue midodrine  -patient/family requests Dr. oBx to be consulted     Recurrent treatment for presumed sepsis  --Dr. Barry to follow - had been on cefepime at home until 5/20.  - Dr. Barry rec'd obs off abx and checking BK, CMV, adenovirus, fungitel, EBV, GI PCR, blood cultures from port/peripheral, histo.     CKD stage 4 with elevated Cr  --Significant other again requesting Nephrology consultation. His creatinine is better than it was when he left last week.     NSCLC  --s/p neoadjuvant chemo, RLL lobectomy 1/2024, immunotherapy (last dose Opdivo 4/4/24)  --Follows with Dr. Ashley, further treatment on hold due to renal and infectious issues. She is consulted     CAD  AV block s/p PPM  Hx HTN  --Continue statin, holding Amlodipine due to hypotension     Chronic anemia  --Monitor, stable overall  -Continue iron supplement     Emphysema  Tobacco abuse  --Trelegy substitute   --PRN nebs     Impaired glucose tolerance  --Last HbA1c 6.3%    All problems listed above are new to me this morning    Expected Discharge Location and Transportation: TBD  Expected Discharge   Expected discharge date/ time has not been documented.     DVT prophylaxis:  Medical DVT prophylaxis orders are present.         AM-PAC 6 Clicks Score (PT): 20 (05/20/24 2000)    CODE STATUS:   Code Status and Medical Interventions:   Ordered at: 05/20/24 1481     Code Status (Patient has no pulse and is not breathing):    CPR (Attempt to Resuscitate)     Medical Interventions (Patient has pulse or is  breathing):    Full Support       Roc Rosales MD  05/21/24

## 2024-05-21 NOTE — CASE MANAGEMENT/SOCIAL WORK
Discharge Planning Assessment  [facility]     Patient Name: David Barfield  MRN: [id number]  Today's Date: 5/21/2024    Admit Date: 5/20/2024    Plan: home   Discharge Needs Assessment       Row Name 05/21/24 1444       Living Environment    People in Home friend(s)    Current Living Arrangements home    Potentially Unsafe Housing Conditions none    In the past 12 months has the electric, gas, oil, or water company threatened to shut off services in your home? No    Primary Care Provided by self    Provides Primary Care For no one    Family Caregiver if Needed friend(s)    Quality of Family Relationships disruptive;involved    Able to Return to Prior Arrangements yes       Resource/Environmental Concerns    Resource/Environmental Concerns none    Transportation Concerns none       Transportation Needs    In the past 12 months, has lack of transportation kept you from medical appointments or from getting medications? no    In the past 12 months, has lack of transportation kept you from meetings, work, or from getting things needed for daily living? No       Food Insecurity    Within the past 12 months, you worried that your food would run out before you got the money to buy more. Never true    Within the past 12 months, the food you bought just didn't last and you didn't have money to get more. Never true       Transition Planning    Patient/Family Anticipates Transition to home    Patient/Family Anticipated Services at Transition none    Transportation Anticipated family or friend will provide       Discharge Needs Assessment    Readmission Within the Last 30 Days previous discharge plan unsuccessful    Equipment Currently Used at Home cane, straight;walker, standard;shower chair;lift device    Concerns to be Addressed denies needs/concerns at this time                   Discharge Plan       Row Name 05/21/24 1445       Plan    Plan home    Patient/Family in Agreement with Plan yes    Plan Comments Met with  patient and friend at the bedside to initate discharge planning. Patient lives with his friend in [facility]. At baseline, patient is mostly independent with ADLs but has some assistance with meal prep and shopping. Patient denies any home or outpatient therapy services. No home oxygen. Patient has Medicare with commercial supplemental insurance and prescription benefits. Patient prefers to fill scriptst at the [facility] in [facility]. Patient's goal at discharge is to return home. No discharge needs identified at this time. CM will continue to follow.    Final Discharge Disposition Code 01 - home or self-care                  Continued Care and Services - Admitted Since 5/20/2024    No active coordination exists for this encounter.       Expected Discharge Date and Time       Expected Discharge Date Expected Discharge Time    May 23, 2024            Demographic Summary       Row Name 05/21/24 1443       General Information    Admission Type inpatient    Arrived From home    Referral Source physician    Reason for Consult discharge planning    Preferred Language English    General Information Comments PCP Hayley Castellanos       Contact Information    Permission Granted to Share Info With family/designee    Contact Information Comments SAI SOARES (Son)  [phone] (Mobile)                   Functional Status       Row Name 05/21/24 1443       Functional Status    Usual Activity Tolerance good    Current Activity Tolerance moderate       Functional Status, IADL    Medications independent    Meal Preparation assistive person    Housekeeping independent    Laundry independent    Shopping assistive person       Mental Status    General Appearance WDL WDL       Mental Status Summary    Recent Changes in Mental Status/Cognitive Functioning no changes       Employment/    Employment Status retired                   Psychosocial    No documentation.                  Abuse/Neglect    No  documentation.                  Legal    No documentation.                  Substance Abuse    No documentation.                  Patient Forms    No documentation.                     Aisha Jeffrey RN

## 2024-05-21 NOTE — CONSULTS
INFECTIOUS DISEASE CONSULT/INITIAL HOSPITAL VISIT    David Barfield  1950  [id number]    Date of consult: 5/21/2024    Admit date: 5/20/2024    Requesting Provider: Derian Barry MD  Evaluating physician: Derian Barry MD  Reason for Consultation: known to patient. Sent from clinic  Chief Complaint: fatigue, poor appetite.      Subjective   History of present illness:  David Barfield is a  74 y.o.  Yr old male with PMH NSCLC/RLL lobectomy (1/2024) recently on Opdivo (last dose on 4/4/24), HTN, and emphysema who I have followed during multiple recent admissions after he presented with vague abdominal pain, nausea, vomiting, diarrhea, severe leukocytosis with neutrophilia, and recent renal dysfunction. The patient was recently on cefepime 2 g IV every 24 hours empirically for sepsis of unclear etiology.  He remained on cefepime for about 2 weeks after his most recent hospitalization. There has been concern for bilateral pyelonephritis based on imaging and some of his symptoms.  He did initially seem to respond to cefepime after his first admission with a downtrend of his white blood cell count and improvement in his symptoms.  Renal function also improved from a creatinine greater than 8 down to a creatinine just over 2 during his initial admission.  He has recently been admitted to additional times with subsequent improvement during his hospitalizations and then he declines after discharge.  Multiple sets of blood cultures and urine cultures have been no growth.  He has had some pyuria on his urinalyses.  He is also intermittently had diarrhea but C. Difficile testing and GI PCR panels have been negative. I additionally sent a karius test from his blood during 1 of his previous admissions and this was negative although antibiotic modified.  Plan after his last hospitalization was to treat him empirically with a course of cefepime for up to 4 weeks to see if he responded.  There is also been some  concern that prior immunotherapy without Depo contribute into his presentation.  He did present back to the ER a couple nights ago and was offered admission but declined and left AMA from the ER.  His white blood cell count was again elevated to 21.85 and his creatinine was 4.73 during his ER visit.  Pro-calcitonin was also slightly elevated to 1.03.  Repeat urine cultures and blood cultures remain no growth to date.  Repeat CT abdomen and pelvis showed signs of colitis with no abscess.  I reevaluate the patient in clinic yesterday and he was not feeling well and his blood pressure was noted to be in the 80s over 50s.  He stated that he was not eating well at home and he remained nauseous with some recent recurrence of his diarrhea.  He is not having any fevers or chills.  His urine output and also reportedly decreased but he was still urinating.  I decided to directly admit the patient to the hospital for further evaluation given his worsening symptoms.  Antibiotics have currently been held as I want to see how he does off of antibiotics as it is unclear if this is actually an infectious process.  I did find a case report of anti-tubular basement membrane antibody disease associated with Opdivo infusion and concomitant acute pyelonephritis leading to acute kidney injury.     Since arrival, the patient is feeling slightly better.  Still having some diarrhea although somewhat better today.  Still having some intermittent nausea and poor appetite.  He remains afebrile.  Blood pressure has improved since arrival and his Midodrine dosing has been adjusted by nephrology. White blood cell count has trended down to 17.18 off of antibiotics.  Creatinine is 4.49, down from 4.57 yesterday.  GI PCR panel was negative.  He does have a BK virus PCR from his urine, urine histo antigen, CMV PCR from his blood, adenovirus PCR from his blood, Fungitell BDG, EBV antibody profile pending. Recent ANCA panel was negative. Blood cultures  are in progress. Urine culture from 5/19 was no growth final.    Of note, the patient does admit to bird watching and some close contact with birds.  He had not previously admitted to this.  He does not endorse any other zoonotic exposures.     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    Coronary artery disease 2019    Diabetes mellitus Borderline    Emphysema/COPD     Erectile disorder     GERD (gastroesophageal reflux disease)     History of chemotherapy     Hyperlipidemia     Hypertension 2019    Lung nodule     Mumps     Mumps     Pruritus     after bath    Slow to wake up after anesthesia     Stage 5 chronic kidney disease not on chronic dialysis 5/14/2024    Wears dentures     upper only    Wears hearing aid in both ears     usually only wears right       Past Surgical History:   Procedure Laterality Date    BONE BIOPSY      broken bone surgery in his face    BRONCHOSCOPY THORACOTOMY Right 01/09/2024    Procedure: THORACOTOMY FOR LOWER LOBECTOMY AND MEDISTINAL LYMPH NODE DISSECTION RIGHT;  Surgeon: Joey Patel MD;  Location: [facility] OR;  Service: Cardiothoracic;  Laterality: Right;    BRONCHOSCOPY WITH ION ROBOTIC ASSIST N/A 09/15/2023    Procedure: BRONCHOSCOPY NAVIGATION WITH ENDOBRONCHIAL ULTRASOUND AND ION ROBOT;  Surgeon: Octaviano Sampson MD;  Location:  [facility];  Service: Robotics - Pulmonary;  Laterality: N/A;  ion #6 - [id number]  - 0015  Cath guide 0061    EBUS balloon removed and intact    CARDIAC ELECTROPHYSIOLOGY PROCEDURE N/A 08/17/2021    Procedure: Pacemaker DC new;  Surgeon: Kayy Box MD;  Location:  [facility];  Service: Cardiology;  Laterality: N/A;    FACIAL FRACTURE SURGERY      LYMPH NODE BIOPSY  2023    PACEMAKER IMPLANTATION         Pediatric History   Patient Parents    Not on file     Other Topics Concern    Not on file  "  Social History Narrative    Lives in [place], [place]       family history includes Aneurysm in his mother; Cancer in his sister; Dementia in his father; Heart disease in his paternal grandmother; Hypertension in his paternal grandfather; Leukemia in his sister.    Allergies   Allergen Reactions    Cymbalta [Duloxetine Hcl] GI Intolerance    Gabapentin Mental Status Change     Pt states that this medication \"makes him feel foolish in his head\".     Remeron [Mirtazapine] Other (See Comments)     Excess sedation    Toradol [Ketorolac Tromethamine] GI Intolerance     Projectile vomiting     Latex Other (See Comments)     Latex allergy     Tape Rash       Immunization History   Administered Date(s) Administered    ABRYSVO (RSV, 60+ or pregnant women 32-36 wks) 10/16/2023    COVID-19 (PFIZER) BIVALENT 12+YRS 09/16/2022    COVID-19 (PFIZER) Purple Cap Monovalent 01/29/2021, 02/19/2021, 10/18/2021, 04/14/2022    COVID-19 F23 (PFIZER) 12YRS+ (COMIRNATY) 09/26/2023    Fluzone High-Dose 65+yrs 10/06/2023    Pneumococcal Conjugate 20-Valent (PCV20) 10/11/2023       Medication:    Current Facility-Administered Medications:     acetaminophen (TYLENOL) tablet 650 mg, 650 mg, Oral, Q4H PRN **OR** acetaminophen (TYLENOL) 160 MG/5ML oral solution 650 mg, 650 mg, Oral, Q4H PRN **OR** acetaminophen (TYLENOL) suppository 650 mg, 650 mg, Rectal, Q4H PRN, Carey Huggins M II, DO    sennosides-docusate (PERICOLACE) 8.6-50 MG per tablet 2 tablet, 2 tablet, Oral, BID PRN **AND** polyethylene glycol (MIRALAX) packet 17 g, 17 g, Oral, Daily PRN **AND** bisacodyl (DULCOLAX) EC tablet 5 mg, 5 mg, Oral, Daily PRN **AND** bisacodyl (DULCOLAX) suppository 10 mg, 10 mg, Rectal, Daily PRN, Carey Huggins M II, DO    budesonide-formoterol (SYMBICORT) 160-4.5 MCG/ACT inhaler 2 puff, 2 puff, Inhalation, BID - RT, 2 puff at 05/21/24 1050 **AND** tiotropium (SPIRIVA RESPIMAT) 2.5 mcg/act aerosol solution inhaler, 2 puff, Inhalation, Daily - RT, Bhavna, " Carey MAYFIELD II, DO, 2 puff at 05/21/24 1051    Calcium Replacement - Follow Nurse / BPA Driven Protocol, , Does not apply, PRN, Carey Huggins II, DO    ferrous sulfate tablet 325 mg, 325 mg, Oral, Daily With Breakfast, Roc Rosales MD, 325 mg at 05/21/24 0844    heparin (porcine) 5000 UNIT/ML injection 5,000 Units, 5,000 Units, Subcutaneous, Q8H, Carey Huggins II, DO, 5,000 Units at 05/21/24 0426    HYDROcodone-acetaminophen (NORCO) 7.5-325 MG per tablet 1 tablet, 1 tablet, Oral, Q6H PRN, Carey Huggins II, DO    Magnesium Low Dose Replacement - Follow Nurse / BPA Driven Protocol, , Does not apply, PRN, Roc Rosales MD    midodrine (PROAMATINE) tablet 10 mg, 10 mg, Oral, TID PRN, Carey Huggins II, DO    ondansetron ODT (ZOFRAN-ODT) disintegrating tablet 4 mg, 4 mg, Oral, Q6H PRN, 4 mg at 05/20/24 1955 **OR** ondansetron (ZOFRAN) injection 4 mg, 4 mg, Intravenous, Q6H PRN, Carey Huggins II, DO    pantoprazole (PROTONIX) EC tablet 40 mg, 40 mg, Oral, Q AM, Carey Huggins II, DO, 40 mg at 05/21/24 0426    Phosphorus Replacement - Follow Nurse / BPA Driven Protocol, , Does not apply, PRN, Carey Huggins II, DO    Potassium Replacement - Follow Nurse / BPA Driven Protocol, , Does not apply, PRN, Carey Huggins II, DO    pravastatin (PRAVACHOL) tablet 80 mg, 80 mg, Oral, Nightly, Carey Huggins II, DO, 80 mg at 05/20/24 1947    sodium bicarbonate tablet 650 mg, 650 mg, Oral, TID, [place], Kurt Martinez MD    sodium chloride 0.9 % flush 10 mL, 10 mL, Intravenous, Q12H, Carey Huggins II, DO, 10 mL at 05/20/24 1947    sodium chloride 0.9 % flush 10 mL, 10 mL, Intravenous, PRN, Carey Huggins M II, DO    sodium chloride 0.9 % infusion 40 mL, 40 mL, Intravenous, PRN, Carey Huggnis II, DO    Please refer to the medical record for a full medication list    Review of Systems:    Constitutional-- No Fever, chills or sweats.  Poor appetite.  Fatigue.  HEENT-- No new vision, hearing or throat complaints.  " No epistaxis or oral sores.  Denies odynophagia or dysphagia.  No odynophagia or dysphagia. No headache, photophobia or neck stiffness.  CV-- No chest pain, palpitation or syncope  Resp-- No SOB/cough/Hemoptysis  GI- + Nausea and vomiting.  + Diarrhea.  No hematochezia, melena, or hematemesis. Denies jaundice or chronic liver disease.  -- No dysuria, hematuria, or flank pain.  Denies hesitancy, urgency or flank pain.  Lymph- no swollen lymph nodes in neck/axilla or groin.   Heme- No active bruising or bleeding; no Hx of DVT or PE.  MS-- no swelling or pain in the bones or joints of arms/legs.  No new back pain.  Neuro-- No acute focal weakness or numbness in the arms or legs.  No seizures.  Skin--No rashes or lesions    Physical Exam:   Vital Signs   Temp:  [97.2 °F (36.2 °C)-99.7 °F (37.6 °C)] 97.2 °F (36.2 °C)  Heart Rate:  [70-77] 70  Resp:  [15-20] 18  BP: (110-132)/(60-72) 111/60    Temp  Min: 97.2 °F (36.2 °C)  Max: 99.7 °F (37.6 °C)  BP  Min: 110/72  Max: 132/69  Pulse  Min: 70  Max: 77  Resp  Min: 15  Max: 20  SpO2  Min: 93 %  Max: 98 %    Blood pressure 111/60, pulse 70, temperature 97.2 °F (36.2 °C), temperature source Oral, resp. rate 18, height 182.9 cm (72\"), weight 76.8 kg (169 lb 6.4 oz), SpO2 98%.  GENERAL: Awake and alert, in no acute distress. Appears stated age.  Frail  HEENT:  Normocephalic, atraumatic. No external oral lesions noted.  No thrush noted. Ears externally normal, Nose externally normal.  EYES: PERRL. No conjunctival injection. No icterus.   HEART: RRR, no murmur  LUNGS: Clear to auscultation and percussion. No respiratory distress, no use of accessory muscles.  ABDOMEN: Soft, nontender, nondistended. No appreciable HSM.  Bowel sounds normal.  GENITAL: no Yeung catheter  SKIN: no generalized rashes.  No peripheral stigmata of infective endocarditis noted.  PSYCHIATRIC: cooperative.  Appropriate mood and affect  EXT:  No cellulitic change.  NEURO: awake alert and oriented ×4.  " "Normal speech and cognition    AICD pocket site is without any erythema or tenderness    Left chest wall Mediport site is without any erythema or tenderness    Results Review:   I reviewed the patient's new clinical results.  I reviewed the patient's new imaging results and agree with the interpretation.  I reviewed the patient's other test results and agree with the interpretation    Results from last 7 days   Lab Units 05/21/24  0448 05/20/24  1820 05/19/24  0940   WBC 10*3/mm3 17.18* 22.57* 21.85*   HEMOGLOBIN g/dL 8.9* 9.5* 10.0*   HEMATOCRIT % 28.0* 29.8* 32.3*   PLATELETS 10*3/mm3 288 324 326     Results from last 7 days   Lab Units 05/21/24  0448   SODIUM mmol/L 134*   POTASSIUM mmol/L 4.3   CHLORIDE mmol/L 103   CO2 mmol/L 17.0*   BUN mg/dL 37*   CREATININE mg/dL 4.49*   GLUCOSE mg/dL 91   CALCIUM mg/dL 8.4*     Results from last 7 days   Lab Units 05/20/24  1820   ALK PHOS U/L 101   BILIRUBIN mg/dL 0.2   ALT (SGPT) U/L 9   AST (SGOT) U/L 13                 Results from last 7 days   Lab Units 05/19/24  0940   LACTATE mmol/L 1.3     Estimated Creatinine Clearance: 15.4 mL/min (A) (by C-G formula based on SCr of 4.49 mg/dL (H)).  CPK          5/6/2024    02:07   Common Labs   Creatine Kinase 12       Procalitonin Results:      Lab 05/19/24  0940   PROCALCITONIN 1.03*      Brief Urine Lab Results  (Last result in the past 365 days)        Color   Clarity   Blood   Leuk Est   Nitrite   Protein   CREAT   Urine HCG        05/19/24 1036 Yellow   Cloudy   Small (1+)   Large (3+)   Negative   100 mg/dL (2+)                  No results found for: \"SITE\", \"ALLENTEST\", \"PHART\", \"MBP6YQC\", \"PO2ART\", \"VSH0JJU\", \"BASEEXCESS\", \"J8WZTPWY\", \"HGBBG\", \"HCTABG\", \"OXYHEMOGLOBI\", \"METHHGBN\", \"CARBOXYHGB\", \"CO2CT\", \"BAROMETRIC\", \"MODALITY\", \"FIO2\"     Microbiology:  Blood Culture   Date Value Ref Range Status   05/19/2024 No growth at 2 days  Preliminary       Urine Culture   Date Value Ref Range Status   05/19/2024 No growth  " "Final     No results found for: \"VIRALCULTU\", \"WOUNDCX\"     Blood Culture   Date Value Ref Range Status   05/19/2024 No growth at 2 days  Preliminary     Urine Culture   Date Value Ref Range Status   05/19/2024 No growth  Final   (      Radiology:  Imaging Results (Last 72 Hours)       ** No results found for the last 72 hours. **            IMPRESSION:     Problems:  Acute renal failure-Creatinine has been fluctuating over the last few weeks.  Slightly better than yesterday. Unclear if this could be antitubular basement membrane antibody disease associated with Opdivo infusion. There is a case report in the literature of this occurring with concomitant acute pyelonephritis leading to acute kidney injury.  Leukocytosis with neutrophilia-unclear etiology.  Initial concern for pyelonephritis but all culture data has been negative.  Unclear if he could have an atypical infection.  He had initially seemed to respond to cefepime but has no longer been doing well on this antibiotic so I have stopped antibiotics and will monitor off antibiotics.  Interestingly his white blood cell count has trended down off of antibiotics.  Hypotension-now improved.  Blood pressure medications being held.  Midodrin dose being adjusted  Nausea  Diarrhea- possibly abx associated. Still having 4+ episodes daily. GI PCR panel negative. Will send repeat C diff screen. Will also send stool O&P screen.  Normocytic anemia  Pyuria/perinephric stranding-repeat urine culture on 5/19 was again no growth but still with significant pyuria.  NSCLC/RLL lobectomy in January 2024, recently on Opdivo (last dose on 4/4/24)    RECOMMENDATIONS:    -continue to closely monitor cbc and bmp  -send fungal and AFB culture from the urine  -follow repeat blood cultures  -follow BK virus PCR from his urine, urine histo antigen, CMV PCR from his blood, adenovirus PCR from his blood, Fungitell BDG, EBV antibody profile. Add serum crypto Ag and Quant Gold TB test as " well.  -Unclear if the patient could benefit from a kidney biopsy.  I have discussed with Dr. Ríos today.  -continue to closely monitor off antibiotics for now  -supportive care per primary team and nephrology    I discussed in length with the patient and his girlfriend at bedside today    I discussed his complex case in length with Dr. Ríos today    Thank you for asking me to see David Barifeld.  Our group would be pleased to follow this patient over the course of their hospitalization and assist with outpatient antimicrobial therapy, as indicated.  Further recommendations depend on the results of the cultures and clinical course.    Complex MDM    Derian Barry MD  5/21/2024

## 2024-05-21 NOTE — CONSULTS
Reason for Consultation: ARACELI/CKD     Subjective     Chief complaint low BP     History of present illness: This is 74-year-old male with history of ARACELI on CKD 4-5 , non-small cell CA s/p new adjuvant therapy, right lower lobe lobectomy January 2024, subsequent immunotherapy with Opdivo, CAD, hypertension, AV block s/p PPM, hypotension, emphysema, tobacco use brought to ED for hypotension and weakness.  He has multiple admissions over the last few months with similar complaints. On his last discharge Scr was 5.67 and this time at admission was 4.73 and improving. Wife is concerned about low BP.  Denies any fever, chills, rigors, rash, hematuria or hemoptysis.  Denies any hx of NSAID use or kidney stones. Nephrology service has been consulted for ARACELI/CKD management.         History  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    Coronary artery disease 2019    Diabetes mellitus Borderline    Emphysema/COPD     Erectile disorder     GERD (gastroesophageal reflux disease)     History of chemotherapy     Hyperlipidemia     Hypertension 2019    Lung nodule     Mumps     Mumps     Pruritus     after bath    Slow to wake up after anesthesia     Stage 5 chronic kidney disease not on chronic dialysis 5/14/2024    Wears dentures     upper only    Wears hearing aid in both ears     usually only wears right   ,   Past Surgical History:   Procedure Laterality Date    BONE BIOPSY      broken bone surgery in his face    BRONCHOSCOPY THORACOTOMY Right 01/09/2024    Procedure: THORACOTOMY FOR LOWER LOBECTOMY AND MEDISTINAL LYMPH NODE DISSECTION RIGHT;  Surgeon: Joey Patel MD;  Location: [facility];  Service: Cardiothoracic;  Laterality: Right;    BRONCHOSCOPY WITH ION ROBOTIC ASSIST N/A 09/15/2023    Procedure: BRONCHOSCOPY NAVIGATION WITH ENDOBRONCHIAL ULTRASOUND AND ION ROBOT;  Surgeon: Octaviano Sampson MD;   Location:  [facility];  Service: Robotics - Pulmonary;  Laterality: N/A;  ion #6 - 0032  - 0015  Cath guide 0061    EBUS balloon removed and intact    CARDIAC ELECTROPHYSIOLOGY PROCEDURE N/A 08/17/2021    Procedure: Pacemaker DC new;  Surgeon: Kayy Box MD;  Location:  [facility];  Service: Cardiology;  Laterality: N/A;    FACIAL FRACTURE SURGERY      LYMPH NODE BIOPSY  2023    PACEMAKER IMPLANTATION     ,   Family History   Problem Relation Age of Onset    Aneurysm Mother         brain    Dementia Father     Leukemia Sister     Heart disease Paternal Grandmother     Hypertension Paternal Grandfather     Cancer Sister    ,   Social History     Socioeconomic History    Marital status: Single    Number of children: 3   Tobacco Use    Smoking status: Every Day     Current packs/day: 0.50     Average packs/day: 0.5 packs/day for 56.4 years (28.7 ttl pk-yrs)     Types: Cigarettes     Start date: 1/1/1968    Smokeless tobacco: Never    Tobacco comments:     Still smoke   Vaping Use    Vaping status: Never Used   Substance and Sexual Activity    Alcohol use: Never    Drug use: Never    Sexual activity: Yes     Partners: Female     Birth control/protection: None     E-cigarette/Vaping    E-cigarette/Vaping Use Never User     Passive Exposure No     Counseling Given No      E-cigarette/Vaping Substances    Nicotine No     THC No     CBD No     Flavoring No      E-cigarette/Vaping Devices    Disposable No     Pre-filled or Refillable Cartridge No     Refillable Tank No     Pre-filled Pod No          ,   Medications Prior to Admission   Medication Sig Dispense Refill Last Dose    albuterol sulfate  (90 Base) MCG/ACT inhaler Inhale 2 puffs Every 4 (Four) Hours As Needed for Wheezing. 18 g 11     amLODIPine (NORVASC) 5 MG tablet Take 1 tablet by mouth Daily for 30 days. PRN for systolic blood pressures greater then 180. 30 tablet 0     B Complex Vitamins (vitamin b complex) capsule  capsule Take 1 capsule by mouth Daily. OTC       cefepime 2000 mg IVPB in 100 mL NS (MBP) Infuse 2,000 mg into a venous catheter Daily for 21 days. Indications: Infection with Dysregulated Inflammatory Response, Urinary Tract Infection       ferrous sulfate 325 (65 FE) MG tablet Take 1 tablet by mouth Daily With Breakfast. OTC       fluticasone (VERAMYST) 27.5 MCG/SPRAY nasal spray 2 sprays into the nostril(s) as directed by provider 1 (One) Time As Needed for Rhinitis or Allergies. 6 mL 2     Fluticasone-Umeclidin-Vilant (Trelegy Ellipta) 100-62.5-25 MCG/ACT inhaler Inhale 1 puff Daily. 3 each 3     HYDROcodone-acetaminophen (Norco) 7.5-325 MG per tablet Take 1 tablet by mouth Every 6 (Six) Hours As Needed for Moderate Pain. 60 tablet 0     lidocaine-prilocaine (EMLA) 2.5-2.5 % cream Apply 1 application  topically to the appropriate area as directed As Needed (45-60 minutes prior to port access.  Cover with saran/plastic wrap.). 30 g 3     midodrine (PROAMATINE) 10 MG tablet Take 0.5 tablets by mouth 3 (Three) Times a Day As Needed (Systolic BP less then 100) for up to 15 doses. 8 tablet 0     omeprazole (priLOSEC) 40 MG capsule Take 1 capsule by mouth Daily. 30 capsule 5     ondansetron (ZOFRAN) 8 MG tablet Take 1 tablet by mouth 3 (Three) Times a Day As Needed for Nausea or Vomiting. 30 tablet 2     pravastatin (PRAVACHOL) 80 MG tablet Take 1 tablet by mouth Every Night. 30 tablet 11     sildenafil (VIAGRA) 100 MG tablet Take 0.5 tablets by mouth Daily As Needed for Erectile Dysfunction.      , Scheduled Meds:  budesonide-formoterol, 2 puff, Inhalation, BID - RT   And  tiotropium bromide monohydrate, 2 puff, Inhalation, Daily - RT  ferrous sulfate, 325 mg, Oral, Daily With Breakfast  heparin (porcine), 5,000 Units, Subcutaneous, Q8H  pantoprazole, 40 mg, Oral, Q AM  pravastatin, 80 mg, Oral, Nightly  sodium chloride, 10 mL, Intravenous, Q12H   , Continuous Infusions:   , PRN Meds:    acetaminophen **OR**  acetaminophen **OR** acetaminophen    senna-docusate sodium **AND** polyethylene glycol **AND** bisacodyl **AND** bisacodyl    Calcium Replacement - Follow Nurse / BPA Driven Protocol    HYDROcodone-acetaminophen    Magnesium Low Dose Replacement - Follow Nurse / BPA Driven Protocol    midodrine    ondansetron ODT **OR** ondansetron    Phosphorus Replacement - Follow Nurse / BPA Driven Protocol    Potassium Replacement - Follow Nurse / BPA Driven Protocol    sodium chloride    sodium chloride, and Allergies:  Cymbalta [duloxetine hcl], Gabapentin, Remeron [mirtazapine], Toradol [ketorolac tromethamine], Latex, and Tape    Review of Systems  Pertinent items are noted in HPI    Objective     Vital Signs  Temp:  [97.2 °F (36.2 °C)-99.7 °F (37.6 °C)] 98 °F (36.7 °C)  Heart Rate:  [70-77] 70  Resp:  [16-20] 16  BP: (110-132)/(63-72) 132/69    I/O this shift:  In: -   Out: 500 [Urine:500]  I/O last 3 completed shifts:  In: 520 [P.O.:520]  Out: 600 [Urine:600]    Physical Exam:  General Appearance:    Alert, cooperative, in no acute distress   Head:    Normocephalic, without obvious abnormality, atraumatic   Eyes:            Conjunctivae and sclerae normal, no   icterus, no pallor, corneas clear, PERRLA           Neck:   Supple, trachea midline, no thyromegaly,  no JVD       Lungs:     Clear to auscultation,respirations regular, even and               unlabored    Heart:    Regular rhythm and normal rate, normal S1 and S2, no       murmur, no gallop, no rub, no click       Abdomen:     Normal bowel sounds,soft, non-tender, non-distended, no guarding, no rebound tenderness       Extremities:   Moves all extremities well, no edema, no cyanosis, no         redness   Pulses:   Pulses palpable and equal bilaterally           Neurologic:   Cranial nerves 2 - 12 grossly intact, no focal deficit         Results Review:   I reviewed the patient's new clinical results.    WBC WBC   Date Value Ref Range Status   05/21/2024 17.18 (H)  "3.40 - 10.80 10*3/mm3 Final   05/20/2024 22.57 (H) 3.40 - 10.80 10*3/mm3 Final   05/19/2024 21.85 (H) 3.40 - 10.80 10*3/mm3 Final      HGB Hemoglobin   Date Value Ref Range Status   05/21/2024 8.9 (L) 13.0 - 17.7 g/dL Final   05/20/2024 9.5 (L) 13.0 - 17.7 g/dL Final   05/19/2024 10.0 (L) 13.0 - 17.7 g/dL Final      HCT Hematocrit   Date Value Ref Range Status   05/21/2024 28.0 (L) 37.5 - 51.0 % Final   05/20/2024 29.8 (L) 37.5 - 51.0 % Final   05/19/2024 32.3 (L) 37.5 - 51.0 % Final      Platlets No results found for: \"LABPLAT\"   MCV MCV   Date Value Ref Range Status   05/21/2024 92.7 79.0 - 97.0 fL Final   05/20/2024 91.7 79.0 - 97.0 fL Final   05/19/2024 94.7 79.0 - 97.0 fL Final          Sodium Sodium   Date Value Ref Range Status   05/21/2024 134 (L) 136 - 145 mmol/L Final   05/20/2024 133 (L) 136 - 145 mmol/L Final   05/19/2024 136 136 - 145 mmol/L Final      Potassium Potassium   Date Value Ref Range Status   05/21/2024 4.3 3.5 - 5.2 mmol/L Final   05/20/2024 4.4 3.5 - 5.2 mmol/L Final   05/19/2024 4.2 3.5 - 5.2 mmol/L Final      Chloride Chloride   Date Value Ref Range Status   05/21/2024 103 98 - 107 mmol/L Final   05/20/2024 101 98 - 107 mmol/L Final   05/19/2024 104 98 - 107 mmol/L Final      CO2 CO2   Date Value Ref Range Status   05/21/2024 17.0 (L) 22.0 - 29.0 mmol/L Final   05/20/2024 17.0 (L) 22.0 - 29.0 mmol/L Final   05/19/2024 19.0 (L) 22.0 - 29.0 mmol/L Final      BUN BUN   Date Value Ref Range Status   05/21/2024 37 (H) 8 - 23 mg/dL Final   05/20/2024 36 (H) 8 - 23 mg/dL Final   05/19/2024 34 (H) 8 - 23 mg/dL Final      Creatinine Creatinine   Date Value Ref Range Status   05/21/2024 4.49 (H) 0.76 - 1.27 mg/dL Final   05/20/2024 4.57 (H) 0.76 - 1.27 mg/dL Final   05/19/2024 4.73 (H) 0.76 - 1.27 mg/dL Final      Calcium Calcium   Date Value Ref Range Status   05/21/2024 8.4 (L) 8.6 - 10.5 mg/dL Final   05/20/2024 8.9 8.6 - 10.5 mg/dL Final   05/19/2024 9.2 8.6 - 10.5 mg/dL Final      PO4 No " "results found for: \"CAPO4\"   Albumin Albumin   Date Value Ref Range Status   05/20/2024 3.4 (L) 3.5 - 5.2 g/dL Final   05/19/2024 3.6 3.5 - 5.2 g/dL Final      Magnesium Magnesium   Date Value Ref Range Status   05/19/2024 1.5 (L) 1.6 - 2.4 mg/dL Final      Uric Acid No results found for: \"URICACID\"         budesonide-formoterol, 2 puff, Inhalation, BID - RT   And  tiotropium bromide monohydrate, 2 puff, Inhalation, Daily - RT  ferrous sulfate, 325 mg, Oral, Daily With Breakfast  heparin (porcine), 5,000 Units, Subcutaneous, Q8H  pantoprazole, 40 mg, Oral, Q AM  pravastatin, 80 mg, Oral, Nightly  sodium chloride, 10 mL, Intravenous, Q12H           Results from last 7 days   Lab Units 05/21/24  0448 05/20/24  1820 05/19/24  0940 05/17/24  0529 05/16/24  0430 05/15/24  0418   SODIUM mmol/L 134* 133* 136 137   < > 133*   POTASSIUM mmol/L 4.3 4.4 4.2 4.5   < > 4.7   CHLORIDE mmol/L 103 101 104 106   < > 102   CO2 mmol/L 17.0* 17.0* 19.0* 18.0*   < > 18.0*   BUN mg/dL 37* 36* 34* 35*   < > 42*   CREATININE mg/dL 4.49* 4.57* 4.73* 5.67*   < > 5.74*   CALCIUM mg/dL 8.4* 8.9 9.2 8.5*   < > 8.6   ALBUMIN g/dL  --  3.4* 3.6  --   --  2.9*   WBC 10*3/mm3 17.18* 22.57* 21.85* 15.02*   < > 21.06*   HEMOGLOBIN g/dL 8.9* 9.5* 10.0* 9.3*   < > 8.6*   PLATELETS 10*3/mm3 288 324 326 306   < > 286    < > = values in this interval not displayed.           Assessment & Plan       Weakness    Malignant neoplasm of lower lobe of right lung    Severe malnutrition    Leukocytosis    CAD (coronary artery disease)    Hypotension    Stage 4 chronic kidney disease      1.  ARACELI on CKD stage IV - Scr 4.49- stable. creatinine peaked around 7.6 to last admission, patient went home with a creatinine slowly coming down to 5 range.    2.  Proteinuria   3.  S/p sepsis resolved patient on last admission was taken off the pressors and was placed on midodrine.  4.  Hyponatremia    5. Anemia   6.  Metabolic acidosis.   7.  Infectious disease: Patient's " followed with ID consultants.  8.  S/p immunotherapy for non-small cell cancer last infusion 4/4/2024 Opdivo.     Recommendation:  - Encourage oral hydration.  - Restart Midodrine at home.as needed.   - Sodium bicarb tablets  - Monitor I/O   - Avoid nephrotoxic agents.   - Renal diet   - Adjust meds per renal function   - No emergent need of RRT   - Monitor H/H and transfuse for Hgb less than 7.0      High risk complex patient multiple medical problems.  I discussed the patients findings and my recommendations with patient and nursing staff    Kurt Ríos MD  05/21/24

## 2024-05-21 NOTE — PLAN OF CARE
Problem: Adult Inpatient Plan of Care  Goal: Plan of Care Review  Outcome: Ongoing, Progressing  Goal: Patient-Specific Goal (Individualized)  Outcome: Ongoing, Progressing  Goal: Absence of Hospital-Acquired Illness or Injury  Outcome: Ongoing, Progressing  Intervention: Identify and Manage Fall Risk  Recent Flowsheet Documentation  Taken 5/21/2024 1400 by Yamileth Moreno RN  Safety Promotion/Fall Prevention:   activity supervised   assistive device/personal items within reach   clutter free environment maintained   toileting scheduled   safety round/check completed   room organization consistent  Taken 5/21/2024 1200 by Yamileth Moreno RN  Safety Promotion/Fall Prevention:   activity supervised   assistive device/personal items within reach   clutter free environment maintained   toileting scheduled   safety round/check completed   room organization consistent  Taken 5/21/2024 1000 by Yamileth Moreno RN  Safety Promotion/Fall Prevention:   activity supervised   assistive device/personal items within reach   clutter free environment maintained   toileting scheduled   safety round/check completed   room organization consistent  Taken 5/21/2024 0800 by Yamileth Moreno RN  Safety Promotion/Fall Prevention:   activity supervised   assistive device/personal items within reach   clutter free environment maintained   toileting scheduled   safety round/check completed   room organization consistent  Intervention: Prevent Skin Injury  Recent Flowsheet Documentation  Taken 5/21/2024 1400 by Yamileth Moreno RN  Body Position: position changed independently  Skin Protection:   adhesive use limited   tubing/devices free from skin contact   transparent dressing maintained   skin-to-skin areas padded   skin-to-device areas padded  Taken 5/21/2024 1200 by Yamileth Moreno RN  Body Position: position changed independently  Skin Protection:   adhesive use limited   tubing/devices free from skin contact   transparent  dressing maintained   skin-to-skin areas padded   skin-to-device areas padded  Taken 5/21/2024 1000 by Yamileth Moreno RN  Body Position: position changed independently  Skin Protection:   adhesive use limited   tubing/devices free from skin contact   transparent dressing maintained   skin-to-skin areas padded   skin-to-device areas padded  Taken 5/21/2024 0800 by Yamileth Moreno RN  Body Position: position changed independently  Skin Protection:   adhesive use limited   tubing/devices free from skin contact   skin-to-skin areas padded   transparent dressing maintained   skin-to-device areas padded  Intervention: Prevent and Manage VTE (Venous Thromboembolism) Risk  Recent Flowsheet Documentation  Taken 5/21/2024 1400 by Yamileth Moreno RN  Activity Management: activity encouraged  Taken 5/21/2024 1200 by Yamileth Moreno RN  Activity Management: activity encouraged  Taken 5/21/2024 1000 by Yamileth Moreno RN  Activity Management: activity encouraged  Taken 5/21/2024 0800 by Yamileth Moreno RN  Activity Management:   activity encouraged   ambulated to bathroom   back to bed  Goal: Optimal Comfort and Wellbeing  Outcome: Ongoing, Progressing  Intervention: Provide Person-Centered Care  Recent Flowsheet Documentation  Taken 5/21/2024 0800 by Yamileth Moreno RN  Trust Relationship/Rapport: care explained  Goal: Readiness for Transition of Care  Outcome: Ongoing, Progressing     Problem: Skin Injury Risk Increased  Goal: Skin Health and Integrity  Outcome: Ongoing, Progressing  Intervention: Optimize Skin Protection  Recent Flowsheet Documentation  Taken 5/21/2024 1400 by Yamileth Moreno RN  Pressure Reduction Techniques: frequent weight shift encouraged  Head of Bed (HOB) Positioning: HOB elevated  Pressure Reduction Devices: positioning supports utilized  Skin Protection:   adhesive use limited   tubing/devices free from skin contact   transparent dressing maintained   skin-to-skin areas padded    skin-to-device areas padded  Taken 5/21/2024 1200 by Yamileth Moreno RN  Pressure Reduction Techniques: frequent weight shift encouraged  Head of Bed (HOB) Positioning: [facility] elevated  Pressure Reduction Devices: positioning supports utilized  Skin Protection:   adhesive use limited   tubing/devices free from skin contact   transparent dressing maintained   skin-to-skin areas padded   skin-to-device areas padded  Taken 5/21/2024 1000 by Yamileth Moreno RN  Pressure Reduction Techniques: frequent weight shift encouraged  Head of Bed (HOB) Positioning: [facility] elevated  Pressure Reduction Devices: positioning supports utilized  Skin Protection:   adhesive use limited   tubing/devices free from skin contact   transparent dressing maintained   skin-to-skin areas padded   skin-to-device areas padded  Taken 5/21/2024 0800 by Yamileth Moreno RN  Pressure Reduction Techniques: frequent weight shift encouraged  Head of Bed (HOB) Positioning: [facility] elevated  Pressure Reduction Devices: positioning supports utilized  Skin Protection:   adhesive use limited   tubing/devices free from skin contact   skin-to-skin areas padded   transparent dressing maintained   skin-to-device areas padded     Problem: Asthma Comorbidity  Goal: Maintenance of Asthma Control  Outcome: Ongoing, Progressing     Problem: Behavioral Health Comorbidity  Goal: Maintenance of Behavioral Health Symptom Control  Outcome: Ongoing, Progressing     Problem: COPD (Chronic Obstructive Pulmonary Disease) Comorbidity  Goal: Maintenance of COPD Symptom Control  Outcome: Ongoing, Progressing     Problem: Diabetes Comorbidity  Goal: Blood Glucose Level Within Targeted Range  Outcome: Ongoing, Progressing     Problem: Heart Failure Comorbidity  Goal: Maintenance of Heart Failure Symptom Control  Outcome: Ongoing, Progressing     Problem: Hypertension Comorbidity  Goal: Blood Pressure in Desired Range  Outcome: Ongoing, Progressing     Problem: Obstructive Sleep Apnea  Risk or Actual Comorbidity Management  Goal: Unobstructed Breathing During Sleep  Outcome: Ongoing, Progressing     Problem: Osteoarthritis Comorbidity  Goal: Maintenance of Osteoarthritis Symptom Control  Outcome: Ongoing, Progressing  Intervention: Maintain Osteoarthritis Symptom Control  Recent Flowsheet Documentation  Taken 5/21/2024 1400 by Yamileth Moreno RN  Activity Management: activity encouraged  Taken 5/21/2024 1200 by Yamileth Moreno RN  Activity Management: activity encouraged  Taken 5/21/2024 1000 by Yamileth Moreno RN  Activity Management: activity encouraged  Taken 5/21/2024 0800 by Yamileth Moreno RN  Activity Management:   activity encouraged   ambulated to bathroom   back to bed     Problem: Pain Chronic (Persistent) (Comorbidity Management)  Goal: Acceptable Pain Control and Functional Ability  Outcome: Ongoing, Progressing     Problem: Seizure Disorder Comorbidity  Goal: Maintenance of Seizure Control  Outcome: Ongoing, Progressing     Problem: Fall Injury Risk  Goal: Absence of Fall and Fall-Related Injury  Outcome: Ongoing, Progressing  Intervention: Promote Injury-Free Environment  Recent Flowsheet Documentation  Taken 5/21/2024 1400 by Yamileth Moreno RN  Safety Promotion/Fall Prevention:   activity supervised   assistive device/personal items within reach   clutter free environment maintained   toileting scheduled   safety round/check completed   room organization consistent  Taken 5/21/2024 1200 by Yamileth Moreno RN  Safety Promotion/Fall Prevention:   activity supervised   assistive device/personal items within reach   clutter free environment maintained   toileting scheduled   safety round/check completed   room organization consistent  Taken 5/21/2024 1000 by Yamileth Moreno RN  Safety Promotion/Fall Prevention:   activity supervised   assistive device/personal items within reach   clutter free environment maintained   toileting scheduled   safety round/check completed    room organization consistent  Taken 5/21/2024 0800 by Yamileth Moreno, RN  Safety Promotion/Fall Prevention:   activity supervised   assistive device/personal items within reach   clutter free environment maintained   toileting scheduled   safety round/check completed   room organization consistent   Goal Outcome Evaluation:

## 2024-05-21 NOTE — PLAN OF CARE
Goal Outcome Evaluation:  Plan of Care Reviewed With: patient, significant other, family        Progress: no change     Problem: Adult Inpatient Plan of Care  Goal: Plan of Care Review  Outcome: Ongoing, Progressing  Flowsheets (Taken 5/21/2024 0057)  Progress: no change  Plan of Care Reviewed With:   patient   significant other   family  Goal: Patient-Specific Goal (Individualized)  Outcome: Ongoing, Progressing  Goal: Absence of Hospital-Acquired Illness or Injury  Outcome: Ongoing, Progressing  Intervention: Identify and Manage Fall Risk  Recent Flowsheet Documentation  Taken 5/20/2024 2000 by Selena Colón RN  Safety Promotion/Fall Prevention: activity supervised  Intervention: Prevent Skin Injury  Recent Flowsheet Documentation  Taken 5/20/2024 2000 by Selena Colón RN  Body Position: weight shifting  Skin Protection:   adhesive use limited   skin-to-device areas padded   skin-to-skin areas padded   transparent dressing maintained   tubing/devices free from skin contact  Intervention: Prevent and Manage VTE (Venous Thromboembolism) Risk  Recent Flowsheet Documentation  Taken 5/20/2024 2000 by Selena Colón RN  Activity Management: activity encouraged  VTE Prevention/Management: (hep subq)   bilateral   sequential compression devices off  Range of Motion:   active ROM (range of motion) encouraged   ROM (range of motion) performed  Goal: Optimal Comfort and Wellbeing  Outcome: Ongoing, Progressing  Intervention: Provide Person-Centered Care  Recent Flowsheet Documentation  Taken 5/20/2024 2000 by Selena Colón RN  Trust Relationship/Rapport:   care explained   choices provided   emotional support provided   empathic listening provided   questions answered   questions encouraged   reassurance provided   thoughts/feelings acknowledged  Goal: Readiness for Transition of Care  Outcome: Ongoing, Progressing     Problem: Skin Injury Risk Increased  Goal: Skin Health and Integrity  Outcome: Ongoing,  Progressing  Intervention: Optimize Skin Protection  Recent Flowsheet Documentation  Taken 5/20/2024 2000 by Selena Colón RN  Pressure Reduction Techniques: frequent weight shift encouraged  Pressure Reduction Devices: positioning supports utilized  Skin Protection:   adhesive use limited   skin-to-device areas padded   skin-to-skin areas padded   transparent dressing maintained   tubing/devices free from skin contact     Problem: Fall Injury Risk  Goal: Absence of Fall and Fall-Related Injury  Outcome: Ongoing, Progressing  Intervention: Promote Injury-Free Environment  Recent Flowsheet Documentation  Taken 5/20/2024 2000 by Selena Colón, RN  Safety Promotion/Fall Prevention: activity supervised

## 2024-05-21 NOTE — PLAN OF CARE
Subjective:  Amy Gutierres is a 51 year old male who was admitted on 3/16/2017 . Current active chief complain is chest pain    Since the last evaluation, the patient has chest pain    Objective:  Temp:  [95.9 °F (35.5 °C)-96.6 °F (35.9 °C)] 95.9 °F (35.5 °C)  Pulse:  [78-85] 85  Resp:  [18-20] 20  BP: ()/(52-82) 137/82  Visit Vitals   • /82 (BP Location: [facility], Patient Position: Sitting)   • Pulse 85   • Temp 95.9 °F (35.5 °C) (Oral)   • Resp 20   • Ht 5' 9\" (1.753 m)   • Wt (!) 179.9 kg   • SpO2 97%   • BMI 58.57 kg/m2       Intake/Output Summary (Last 24 hours) at 03/18/17 1449  Last data filed at 03/18/17 0922   Gross per 24 hour   Intake              842 ml   Output              600 ml   Net              242 ml       General  No acute distress      Respiratory  Clear to auscultation bilaterally    Cardiovascular  Regular heart sounds are heard    GI  Abdomen is soft        Labs:     Recent Labs  Lab 03/16/17  1730   SODIUM 135   POTASSIUM 5.1   CHLORIDE 100   CO2 24   BUN 29*   CREATININE 1.32*   GLUCOSE 270*   ALBUMIN 3.8   AST 27   BILIRUBIN 0.3         Recent Labs  Lab 03/18/17  1025 03/17/17  2215 03/17/17  1005 03/17/17  0640  03/16/17  1730   WBC  --   --   --  6.5  --  7.7   HGB 12.7* 12.7* 12.7* 12.7*  < > 14.0   HCT 38.9* 40.0 38.7* 39.2  < > 43.2   PLT  --   --   --  278  --  313   MCV  --   --   --  82.7  --  82.6   < > = values in this interval not displayed.    I/O last 3 completed shifts:  In: 1140 [P.O.:1140]  Out: 1350 [Urine:1350]        Assessment/Plan:  1. Chest pain  Patient has had been recommended for cardiac catheter multiple times in the past, has refused. Today he is agreeable, I discussed with cardiology. This may be done as outpatient as patient's chest pain appears  noncardiac  Continue aspirin, statin and beta blockers. Continue telemetry. Serial troponin.     Per Cardiology, patient does not necessarily need to be on Plavix especially in the setting of #2     2.  Goal Outcome Evaluation:  Plan of Care Reviewed With: patient, significant other           Outcome Evaluation: Pt presents slightly below baseline d/t decr activity tolerance asssociated with drop in BP with change in position.  Pt ind LBD, sup STS, CGA to take 3 side steps toward HOB without AD.  OT eval limited d/t drop in BP with actiivty,  OT will follow  to advance pt toward PLOF.  Recommend home with assist upon d/c.      Anticipated Discharge Disposition (OT): home with assist                         Hematemesis  Patient is on aspirin  stable hemoglobin. Oral PPI. Patient cleared by GI for cardiac catheterization if indicated     3. Abnormal chest x-ray showed evidence bronchial thickening  Patient reports hematemesis some question whether he had hemoptysis. D-dimer negative.  CT chest unimpressive    4. Diabetes mellitus type 2 uncontrolled with long-term insulin use  Restart home regimen.     5. MATT  CPAP    Disposition  Pending plan from cardiology, if no plans to cath ,  is ready to discharge from medical standpoint however patient continues to complain of gen weakness now, needs PT evaluation as well    Lynn Longoria MD  3/18/2017

## 2024-05-21 NOTE — NURSING NOTE
Per the female  at bedside they were checking his BP every hour and giving midodrine if SBP was below 100. Medication was not being given as directed by ordering MD.

## 2024-05-21 NOTE — NURSING NOTE
Pt needs advanced care planning, consult placed, to determine who he wants his POA or health care surrogate to be. Preferably without the presence of his girlfriend there.

## 2024-05-21 NOTE — PLAN OF CARE
Goal Outcome Evaluation:  Plan of Care Reviewed With: patient, significant other           Outcome Evaluation: PT evaluation completed. Pt presents slightly below baseline levels of mobility for strength and activity tolerance and is limited in functional mobility by orthostatic hypotension warranting IP PT services to address deficits and facilitate return to PLOF. Recommend home with assist following d/c.      Anticipated Discharge Disposition (PT): home with assist

## 2024-05-21 NOTE — CONSULTS
[facility] Cardiology, Inpatient Consult  Date of Hospital Visit: 24  Encounter Provider: Tricia Muse PA-C    Place of Service: [facility]  Patient Name: David Barfield  :1950  MRN: [id number]     Primary Care Provider: Hayley Castellanos APRN    Chief complaint:     PROBLEM LIST  Sick sinus syndrome  Exercise MPS 20, OSH: No perfusion defect, EF 53%  Occasional asymptomatic junctional rhythm per previous cardiologist note  Zio patch 20, 13 days, OSH: min 31, max 152, avg 56. No pauses greater than 2 seconds. Second degree type 1 AV. < 1% PAC burden. 8.2% PVC burden.   2 week monitor --: (56). SR, junctional, PVCs, PACs, isorhythmic dissociation and 4.7 sec pause at 9:30 am  Echo 21: EF 55%, mild LVH, trace MR, trace TR.   PPM implantation, 2021: [employer] Accolade MRI DR model L3 1 1 serial #[id number]  Coronary calcifications  Noted on CT scan at least since  with a normal stress test in   Premature ventricular contractions  Zio patch 20: 8.2% PVC burden  Thoracic aneurysm  CT chest 19, OSH: aortic root dilation, 4.2 cm.   CT chest 7/15/19, OSH: aortic root dilation, 4.2 cm.   CT chest 20, OSH: dilated aortic root 4.2 cm.   CT chest 2020, OSH: dilated aortic root, 4.2 cm  COPD with tobacco abuse  Non-small cell lung cancer  Chronic kidney disease  Hyperlipidemia  Recent sepsis 2024  TTE 2024: EF 61-65%, mild LVH, no obvious vegetation on valves  DAMIÁN 2024: EF 61-65%, mild LVH, mild AI with aortic calcification, no mass or vegetation seen on valves or RV lead.  History of Present Illness:  Patient is a 74-year-old history of sick sinus syndrome s/p PPM, stable thoracic aneurysm, hyperlipidemia, chronic kidney disease, recent diagnosis of lung cancer as well as multiple admissions recently for sepsis.  .  Patient was last seen in our office in July.  Since that time patient has been diagnosed  with lung cancer back in November.  He has undergone chemotherapy as well as lobectomy and lymph node dissection and immunotherapy.  Unfortunately patient has had multiple admissions over the last 5 weeks with signs of sepsis.  He has had a transesophageal echocardiogram most recently on 5/9 with no obvious vegetation to his valve or leads of his pacemaker.  Patient has worsening kidney function.  We have been asked to come consult on this patient by family.    Patient's friend in the room provides most of the history.  He has been unwell for the last 5 weeks and in and out of the hospital.  She states that she believes it is related to a change in his regimen for his lung cancer from Opdivo to Immunotherapy.  His friend in the room states that she checks his blood pressure at home and over the last 5 weeks his blood pressure has been low.  He has been taking midodrine as needed for low blood pressure.  Friend in the room states that this concerns her that something may be going on with his heart.  She was also concerned given the coronary calcifications on his CT scan that was done prior to this admission.      Patient denies any chest pain.  He does state he is mildly short of breath when receiving some IV fluids as of now.  He has not noticed any lower extremity edema.  He does admit that he has no appetite at this time and anytime he tries to drink water he feels bloated.      I reviewed patient's past medical history, surgical history, family history and social history.    Current Outpatient Medications   Medication Instructions    albuterol sulfate  (90 Base) MCG/ACT inhaler 2 puffs, Inhalation, Every 4 Hours PRN    amLODIPine (NORVASC) 5 mg, Oral, Every 24 Hours Scheduled, PRN for systolic blood pressures greater then 180.    B Complex Vitamins (vitamin b complex) capsule capsule 1 capsule, Oral, Daily, OTC    cefepime 2000 mg IVPB in 100 mL NS (MBP) 2,000 mg, Intravenous, Every 24 Hours    ferrous  "sulfate 325 mg, Oral, Daily With Breakfast, OTC    fluticasone (VERAMYST) 27.5 MCG/SPRAY nasal spray 2 sprays, Nasal, Once As Needed    Fluticasone-Umeclidin-Vilant (Trelegy Ellipta) 100-62.5-25 MCG/ACT inhaler 1 puff, Inhalation, Daily - RT    HYDROcodone-acetaminophen (Norco) 7.5-325 MG per tablet 1 tablet, Oral, Every 6 Hours PRN    lidocaine-prilocaine (EMLA) 2.5-2.5 % cream 1 application , Topical, As Needed    midodrine (PROAMATINE) 5 mg, Oral, 3 Times Daily PRN    omeprazole (PRILOSEC) 40 mg, Oral, Daily    ondansetron (ZOFRAN) 8 mg, Oral, 3 Times Daily PRN    pravastatin (PRAVACHOL) 80 mg, Oral, Nightly    sildenafil (VIAGRA) 50 mg, Oral, Daily PRN          Scheduled Meds:budesonide-formoterol, 2 puff, Inhalation, BID - RT   And  tiotropium bromide monohydrate, 2 puff, Inhalation, Daily - RT  ferrous sulfate, 325 mg, Oral, Daily With Breakfast  heparin (porcine), 5,000 Units, Subcutaneous, Q8H  pantoprazole, 40 mg, Oral, Q AM  pravastatin, 80 mg, Oral, Nightly  sodium chloride, 10 mL, Intravenous, Q12H      Continuous Infusions:       Review of Systems   Pertinent positives and negatives noted in the HPI         Objective:     Vitals:    05/21/24 0300 05/21/24 0418 05/21/24 0440 05/21/24 0843   BP:  124/66  132/69   BP Location:  Right arm     Patient Position:  Lying     Pulse: 71 77 70    Resp:  20 16 16   Temp:  99.7 °F (37.6 °C) 98 °F (36.7 °C)    TempSrc:  Oral Oral    SpO2: 93% 95% 95% 98%   Weight:       Height:           Body mass index is 22.97 kg/m².  Flowsheet Rows      Flowsheet Row First Filed Value   Admission Height 182.9 cm (72\") Documented at 05/20/2024 1845   Admission Weight 76.8 kg (169 lb 4.8 oz) Documented at 05/20/2024 1621            Intake/Output Summary (Last 24 hours) at 5/21/2024 1047  Last data filed at 5/21/2024 0900  Gross per 24 hour   Intake 760 ml   Output 1100 ml   Net -340 ml       Vitals reviewed.   Constitutional:       Appearance: Healthy appearance. Not in distress. "   Eyes:      Conjunctiva/sclera: Conjunctivae normal.   HENT:    Mouth/Throat:      Pharynx: Oropharynx is clear.   Neck:      Vascular: JVD normal.   Pulmonary:      Effort: Pulmonary effort is normal.      Breath sounds: No wheezing. No rhonchi. No rales.   Cardiovascular:      Normal rate. Regular rhythm.      Murmurs: There is no murmur.      No rub.   Pulses:     Intact distal pulses.   Edema:     Peripheral edema absent.   Abdominal:      General: There is no distension.   Musculoskeletal:         General: No deformity. Skin:     General: Skin is warm and dry.   Neurological:      General: No focal deficit present.      Mental Status: Alert and oriented to person, place and time.           Lab Review:                CBC:      Lab 05/21/24  0448 05/20/24  1820 05/19/24  0940 05/17/24  0529 05/16/24  0430 05/15/24  0418   WBC 17.18* 22.57* 21.85* 15.02* 14.28* 21.06*   HEMOGLOBIN 8.9* 9.5* 10.0* 9.3* 8.8* 8.6*   HEMATOCRIT 28.0* 29.8* 32.3* 30.4* 28.1* 27.1*   PLATELETS 288 324 326 306 298 286   NEUTROS ABS  --  19.28* 19.05*  --   --  17.68*   IMMATURE GRANS (ABS)  --  0.31* 0.19*  --   --  0.20*   LYMPHS ABS  --  0.94 1.02  --   --  1.34   MONOS ABS  --  1.82* 1.37*  --   --  1.54*   EOS ABS  --  0.16 0.16  --   --  0.20   MCV 92.7 91.7 94.7 95.9 95.9 94.4     CMP:        Lab 05/21/24  0448 05/20/24  1820 05/19/24  0940 05/17/24  0529 05/16/24  0430 05/15/24  0418   SODIUM 134* 133* 136 137 137 133*   POTASSIUM 4.3 4.4 4.2 4.5 4.2 4.7   CHLORIDE 103 101 104 106 105 102   CO2 17.0* 17.0* 19.0* 18.0* 19.0* 18.0*   ANION GAP 14.0 15.0 13.0 13.0 13.0 13.0   BUN 37* 36* 34* 35* 41* 42*   CREATININE 4.49* 4.57* 4.73* 5.67* 5.57* 5.74*   EGFR 12.9* 12.7* 12.2* 9.8* 10.0* 9.6*   GLUCOSE 91 108* 118* 95 92 93   CALCIUM 8.4* 8.9 9.2 8.5* 8.7 8.6   MAGNESIUM  --   --  1.5*  --   --   --    PHOSPHORUS  --   --   --   --   --  4.5   TOTAL PROTEIN  --  7.6 7.3  --   --   --    ALBUMIN  --  3.4* 3.6  --   --  2.9*   GLOBULIN   --  4.2 3.7  --   --   --    ALT (SGPT)  --  9 9  --   --   --    AST (SGOT)  --  13 11  --   --   --    BILIRUBIN  --  0.2 0.2  --   --   --    ALK PHOS  --  101 98  --   --   --      Results from last 7 days   Lab Units 05/19/24  0940   MAGNESIUM mg/dL 1.5*     Lab Results   Component Value Date    HGBA1C 6.30 (H) 05/03/2024     Estimated Creatinine Clearance: 15.4 mL/min (A) (by C-G formula based on SCr of 4.49 mg/dL (H)).        Lab 05/19/24  0940   HSTROP T 33*       Lab Results   Component Value Date    CHOL 157 05/03/2024    TRIG 206 (H) 05/03/2024    HDL 27 (L) 05/03/2024    LDL 94 05/03/2024         CT Chest Without Contrast Diagnostic    Result Date: 5/19/2024  Colitis. No abscess formation or perforation. Small right pleural effusion. Stable appearance of the right lower lobe density. Electronically Signed: Kam Foy MD  5/19/2024 11:43 AM EDT  Workstation ID: [id number]    CT Abdomen Pelvis Without Contrast    Result Date: 5/19/2024  Colitis. No abscess formation or perforation. Small right pleural effusion. Stable appearance of the right lower lobe density. Electronically Signed: Kam Foy MD  5/19/2024 11:43 AM EDT  Workstation ID: [id number]      Results for orders placed during the hospital encounter of 05/05/24    Adult Transesophageal Echo 3D (DAMIÁN) W/ Cont If Necessary Per Protocol    Interpretation Summary    Left ventricular systolic function is normal. Left ventricular ejection fraction appears to be 61 - 65%. Normal left ventricular cavity size noted. Left ventricular wall thickness is consistent with mild concentric hypertrophy. All left ventricular wall segments contract normally.    There is mild calcification of the aortic valve. The aortic valve appears trileaflet. Mild aortic valve regurgitation is present. No aortic valve stenosis is present. There is no evidence of an aortic valve mass is present.    There is mild calcification of the mitral valve. There is mild, bileaflet mitral  valve thickening present. No evidence of a mitral valve mass is present. Mild to moderate mitral valve regurgitation is present. No significant mitral valve stenosis is present.    The tricuspid valve is structurally normal with no significant stenosis present. There is no evidence of a mass on the tricuspid valve. Trace tricuspid valve regurgitation is present.    The pulmonic valve is grossly normal in structure. There is trace pulmonic valve regurgitation present.    No vegetation seen on atrial aspect of pacemaker leads.  The most distal aspects of the RV lead were not completely visualized however there was no apparent vegetation in the more proximal segment, adjacent to the tricuspid valve which appears to be functioning normally.       EKG: (5/19/2024): Atrial paced with a heart rate of 70.    Result Review:  I have personally reviewed the results from the time of admission and agree with these findings:  [x]  Laboratory  [x]  Radiology  [x]  EKG/Telemetry   []  Pathology  []  Old records  []  Other:             Assessment:   Hypotension  Multifactorial but low appetite and poor nutrition likely contributing  Sick sinus syndrome s/p PPM  Coronary calcifications  Noted on CT scan as early as 2020.  MPS at that time showed no evidence of ischemia.  Patient having no angina and recent echocardiogram with normal EF.  No need for further evaluation  Acute on chronic kidney disease  Followed by nephrology  Lung cancer  Sepsis, ID following      Plan:   We had a long discussion with patient and friend at bedside about concerns.  Patient having no angina and no signs of occlusive coronary disease in the past.  Agree patient needs fluid resuscitation to help with his hypotension.  Can add back midodrine as needed and would hold any hypertension medication at this time.  Continue pravastatin 80 mg for hyperlipidemia.  Patient currently being evaluated for sepsis, recent transesophageal echocardiogram with no signs of  vegetation.  If there is still concern about this or infection of patient's pacemaker please reach out to us otherwise we will follow as needed.      Tricia Muse PA-C functioning as a scribe for Dr. Kyay Box.    Additionally we had a long discussion with the patient and his friend at the bedside regarding the use of midodrine.  It should not be used in instances where the patient is dehydrated, progressively anemic or septic as it can actually hide the underlying illness.  If the patient is hypotensive at baseline despite treatment of his other issues then midodrine use is appropriate.    I Kayy Box MD personally performed the services described in this documentation as scribed by the above individual in my presence, and it is both accurate and complete.    Kayy Box MD, FACC

## 2024-05-21 NOTE — CONSULTS
"          Clinical Nutrition Assessment     Patient Name: David Barfield  YOB: 1950  MRN: [id number]  Date of Encounter: 05/21/24 13:14 EDT  Admission date: 5/20/2024  Reason for Visit: Identified at risk by screening criteria, MST score 2+    Assessment   Nutrition Assessment   Admission Diagnosis:  Leukocytosis [D72.829]  Weakness [R53.1]    Problem List:    Weakness    Malignant neoplasm of lower lobe of right lung    Severe malnutrition    Leukocytosis    CAD (coronary artery disease)    Hypotension    Stage 4 chronic kidney disease      PMH:   He  has a past medical history of Abnormal ECG, Arrhythmia (2019), Asthma (2019), Bronchogenic cancer of right lung (10/04/2023), Coronary artery disease (2019), Diabetes mellitus (Borderline), Emphysema/COPD, Erectile disorder, GERD (gastroesophageal reflux disease), History of chemotherapy, Hyperlipidemia, Hypertension (2019), Lung nodule, Mumps, Mumps, Pruritus, Slow to wake up after anesthesia, Stage 5 chronic kidney disease not on chronic dialysis (5/14/2024), Wears dentures, and Wears hearing aid in both ears.    PSH:  He  has a past surgical history that includes Bone biopsy; Facial fracture surgery; Cardiac electrophysiology procedure (N/A, 08/17/2021); BRONCHOSCOPY WITH ION ROBOTIC ASSIST (N/A, 09/15/2023); Pacemaker Implantation; bronchoscopy thoracotomy (Right, 01/09/2024); and Lymph node biopsy (2023).    Applicable Nutrition History:   4/12/24: MSA/Chronic/Severe  4/25/24: MSA/Chronic/Severe  5/15/24: MSA/Chronic/Severe     Anthropometrics     Height: Height: 182.9 cm (72\")  Last Filed Weight: Weight: 76.8 kg (169 lb 6.4 oz) (05/20/24 1847)  Method: Weight Method: Bed scale  BMI: BMI (Calculated): 23    UBW:  194lb   Weight change: reported about 20lb weight loss over 6 months.      Nutrition Focused Physical Exam    Date:     Unable to perform due to completed 5/15; re-eval not indicated at this time *     Subjective " "  Reported/Observed/Food/Nutrition Related History:   5/21  Multiple previous hospital admission. MSA completed (5/15) + sever malnutrition/chronic illness. A re-assessment not indicated as previous assessment was done < week ago.      Spoke with patient wife over the phone. Reports on going weight loss and poor appetite. She is not sure of patient new weight as he did not weight himself after D/C from hospital. Reports appetite is worse and he is not eating or drinking much.  It seems patient was started on Remeron as an appetite stimulant and he had side affected related to it. Reports patient started having \"mental issues/\"  she would like for patient to be considered for a different appetite stimulant.  Communicated with MD (hospitalized caring for patient today) to advise.     Continues to decline Boost/Magic cup and beneprotein (has tried all and dislikes them)    More likely to eat good if received food he likes.     Current Nutrition Prescription   PO: Diet: Regular/House; Fluid Consistency: Thin (IDDSI 0)  Oral Nutrition Supplement:   Intake: Insufficient data 75% x 1 meal     Assessment & Plan   Nutrition Diagnosis   Date:  5/15            Updated:  5/21  Problem Malnutrition     Etiology Chronic illness    Signs/Symptoms Severe weight loss, moderate muscle/fat loss    Status: Ongoing    Date:  5/21   Updated:     Problem Limited Food acceptance   Etiology Poor appetite    Signs/Symptoms Reported per family; insuf intake documented    Status: New    Goal / Objectives:   Nutrition to support treatment and Establish PO      Nutrition Intervention      Follow treatment progress, Care plan reviewed, Interview for preferences, Supplement offered/refused    Kitchen staff to provide menu and review menu options daily   Could benefit from an appetite stimulant such as megace.     Monitoring/Evaluation:   Per protocol, I&O, PO intake, Pertinent labs    Keeley Tyler RD  Time Spent: 30min     "

## 2024-05-21 NOTE — THERAPY EVALUATION
Patient Name: David Barfield  : 1950    MRN: [id number]                              Today's Date: 2024       Admit Date: 2024    Visit Dx: No diagnosis found.  Patient Active Problem List   Diagnosis    High degree atrioventricular block    Bradycardia, sinus    Chronic hypotension    PVC's (premature ventricular contractions)    Presence of cardiac pacemaker    Mixed hyperlipidemia    Centrilobular emphysema    Nodule of lower lobe of right lung    Mediastinal adenopathy    Cough    Tobacco abuse    Bronchogenic cancer of right lung    Malignant neoplasm of lower lobe of right lung    Primary hypertension    GERD without esophagitis    Septic shock    Acute pyelonephritis    ARACELI (acute kidney injury)    Hypokalemia    Severe malnutrition    Leukocytosis    CAD (coronary artery disease)    Hypotension    Stage 4 chronic kidney disease    Moderate malnutrition    Weakness     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    Coronary artery disease 2019    Diabetes mellitus Borderline    Emphysema/COPD     Erectile disorder     GERD (gastroesophageal reflux disease)     History of chemotherapy     Hyperlipidemia     Hypertension 2019    Lung nodule     Mumps     Mumps     Pruritus     after bath    Slow to wake up after anesthesia     Stage 5 chronic kidney disease not on chronic dialysis 2024    Wears dentures     upper only    Wears hearing aid in both ears     usually only wears right     Past Surgical History:   Procedure Laterality Date    BONE BIOPSY      broken bone surgery in his face    BRONCHOSCOPY THORACOTOMY Right 2024    Procedure: THORACOTOMY FOR LOWER LOBECTOMY AND MEDISTINAL LYMPH NODE DISSECTION RIGHT;  Surgeon: Joey Patel MD;  Location: [facility];  Service: Cardiothoracic;  Laterality: Right;    BRONCHOSCOPY WITH ION ROBOTIC ASSIST N/A 09/15/2023     Procedure: BRONCHOSCOPY NAVIGATION WITH ENDOBRONCHIAL ULTRASOUND AND ION ROBOT;  Surgeon: Octaviano Sampson MD;  Location:  [facility];  Service: Robotics - Pulmonary;  Laterality: N/A;  ion #6 - 0032  - 0015  Cath guide 0061    EBUS balloon removed and intact    CARDIAC ELECTROPHYSIOLOGY PROCEDURE N/A 08/17/2021    Procedure: Pacemaker DC new;  Surgeon: Kayy Box MD;  Location:  [facility];  Service: Cardiology;  Laterality: N/A;    FACIAL FRACTURE SURGERY      LYMPH NODE BIOPSY  2023    PACEMAKER IMPLANTATION        General Information       Row Name 05/21/24 0925          OT Time and Intention    Document Type evaluation  -AC     Mode of Treatment occupational therapy  -AC       Row Name 05/21/24 0925          General Information    Patient Profile Reviewed yes  -AC     Prior Level of Function independent:;all household mobility;community mobility;ADL's;driving  No AD to ambulate  -AC     Existing Precautions/Restrictions orthostatic hypotension;fall  fall risk d/t orthostatic hypo  -AC     Barriers to Rehab hearing deficit  -AC       Row Name 05/21/24 0925          Occupational Profile    Environmental Supports and Barriers (Occupational Profile) walk in shower with shower seat  -AC       Row Name 05/21/24 0925          Living Environment    People in Home significant other  -AC       Row Name 05/21/24 0925          Home Main Entrance    Number of Stairs, Main Entrance one  -AC       Row Name 05/21/24 0925          Stairs Within Home, Primary    Stairs, Within Home, Primary chair lift to 2nd level, pt reports he lives main level  -AC       Row Name 05/21/24 0925          Cognition    Orientation Status (Cognition) oriented x 4  -AC       Row Name 05/21/24 0925          Safety Issues, Functional Mobility    Safety Issues Affecting Function (Mobility) awareness of need for assistance;safety precaution awareness  -AC     Impairments Affecting Function (Mobility)  "endurance/activity tolerance  -               User Key  (r) = Recorded By, (t) = Taken By, (c) = Cosigned By      Initials Name Provider Type    Gely Marsh, OT Occupational Therapist                     Mobility/ADL's       Row Name 05/21/24 0956          Bed Mobility    Bed Mobility supine-sit;sit-supine  -AC     Supine-Sit [place] (Bed Mobility) modified independence  -AC     Sit-Supine [place] (Bed Mobility) modified independence  -AC     Comment, (Bed Mobility) pt reporting he feels \" weird\" with mobility  -       Row Name 05/21/24 0956          Transfers    Transfers sit-stand transfer  -       Row Name 05/21/24 0956          Sit-Stand Transfer    Sit-Stand [place] (Transfers) supervision  -     Comment, (Sit-Stand Transfer) performed 2 STS  -       Row Name 05/21/24 0956          Functional Mobility    Functional Mobility- Ind. Level contact guard assist  -     Functional Mobility-Distance (Feet) 3  -AC     Functional Mobility- Comment 3 side steps toward HOB  -       Row Name 05/21/24 0956          Activities of Daily Living    BADL Assessment/Intervention lower body dressing  -       Row Name 05/21/24 0956          Lower Body Dressing Assessment/Training    [place] Level (Lower Body Dressing) doff;don;socks;independent  -AC     Position (Lower Body Dressing) edge of bed sitting  -               User Key  (r) = Recorded By, (t) = Taken By, (c) = Cosigned By      Initials Name Provider Type    Gely Marsh, OT Occupational Therapist                   Obj/Interventions       Row Name 05/21/24 0957          Sensory Assessment (Somatosensory)    Sensory Assessment (Somatosensory) UE sensation intact  -       Row Name 05/21/24 0957          Vision Assessment/Intervention    Visual Impairment/Limitations WFL  -       Row Name 05/21/24 0957          Range of Motion Comprehensive    General Range of Motion bilateral upper extremity ROM WNL  -       Row Name " 05/21/24 0957          Strength Comprehensive (MMT)    Comment, General Manual Muscle Testing (MMT) Assessment BUE grossly 4/5  -AC       Row Name 05/21/24 0957          Balance    Balance Assessment sitting static balance;sitting dynamic balance;standing static balance;standing dynamic balance  -AC     Static Sitting Balance independent  -AC     Dynamic Sitting Balance independent  -AC     Position, Sitting Balance unsupported  -AC     Static Standing Balance supervision  -AC     Dynamic Standing Balance contact guard  -AC     Position/Device Used, Standing Balance unsupported  -AC               User Key  (r) = Recorded By, (t) = Taken By, (c) = Cosigned By      Initials Name Provider Type    AC Gely Stahl, OT Occupational Therapist                   Goals/Plan       Row Name 05/21/24 1010          Transfer Goal 1 (OT)    Activity/Assistive Device (Transfer Goal 1, OT) bed-to-chair/chair-to-bed;toilet  -AC     [place] Level/Cues Needed (Transfer Goal 1, OT) independent  -AC     Time Frame (Transfer Goal 1, OT) by discharge  -AC     Progress/Outcome (Transfer Goal 1, OT) new goal;goal ongoing  -       Row Name 05/21/24 1010          Toileting Goal 1 (OT)    Activity/Device (Toileting Goal 1, OT) adjust/manage clothing;perform perineal hygiene;commode  -AC     [place] Level/Cues Needed (Toileting Goal 1, OT) independent  -AC     Time Frame (Toileting Goal 1, OT) by discharge  -AC     Progress/Outcome (Toileting Goal 1, OT) new goal;goal ongoing  -       Row Name 05/21/24 1010          Grooming Goal 1 (OT)    Activity/Device (Grooming Goal 1, OT) oral care  -AC     [place] (Grooming Goal 1, OT) independent  -AC     Time Frame (Grooming Goal 1, OT) by discharge  -AC     Strategies/Barriers (Grooming Goal 1, OT) standing sink side  -AC     Progress/Outcome (Grooming Goal 1, OT) new goal;goal ongoing  -       Row Name 05/21/24 1010          Therapy Assessment/Plan (OT)    Planned Therapy  Interventions (OT) activity tolerance training;BADL retraining;functional balance retraining;occupation/activity based interventions;patient/caregiver education/training;transfer/mobility retraining  -               User Key  (r) = Recorded By, (t) = Taken By, (c) = Cosigned By      Initials Name Provider Type    AC Gely Stahl, GLENDY Occupational Therapist                   Clinical Impression       Row Name 05/21/24 0958          Pain Assessment    Pretreatment Pain Rating 0/10 - no pain  -AC     Posttreatment Pain Rating 0/10 - no pain  -AC       Row Name 05/21/24 0958          Plan of Care Review    Plan of Care Reviewed With patient;significant other  -AC     Outcome Evaluation Pt presents slightly below baseline d/t decr activity tolerance asssociated with drop in BP with change in position.  Pt ind LBD, sup STS, CGA to take 3 side steps toward HOB without AD.  OT eval limited d/t drop in BP with actiivty,  OT will follow  to advance pt toward PLOF.  Recommend home with assist upon d/c.  -       Row Name 05/21/24 0958          Therapy Assessment/Plan (OT)    Rehab Potential (OT) good, to achieve stated therapy goals  -     Criteria for Skilled Therapeutic Interventions Met (OT) yes;skilled treatment is necessary  -     Therapy Frequency (OT) daily  -       Row Name 05/21/24 0958          Therapy Plan Review/Discharge Plan (OT)    Anticipated Discharge Disposition (OT) home with assist  -       Row Name 05/21/24 0958          Vital Signs    Pre Systolic BP Rehab 93  sitting  -AC     Pre Treatment Diastolic BP 62  -AC     Intra Systolic BP Rehab 87  standing - notified RN of drop in BP  -AC     Intra Treatment Diastolic BP 53  -AC     Post Systolic BP Rehab 109  supine  -AC     Post Treatment Diastolic BP 63  -AC     Pretreatment Heart Rate (beats/min) 70  -AC     Posttreatment Heart Rate (beats/min) 89  -AC     Pre SpO2 (%) 97  -AC     O2 Delivery Pre Treatment room air  -AC     O2 Delivery Intra  Treatment room air  -AC     Post SpO2 (%) 97  -AC     O2 Delivery Post Treatment room air  -AC     Pre Patient Position Supine  -AC     Post Patient Position Supine  -AC       Row Name 05/21/24 0958          Positioning and Restraints    Pre-Treatment Position in bed  -AC     Post Treatment Position bed  -AC     In Bed notified nsg;fowlers;call light within reach;encouraged to call for assist;exit alarm on;with family/caregiver  -               User Key  (r) = Recorded By, (t) = Taken By, (c) = Cosigned By      Initials Name Provider Type    AC Gely Stahl, OT Occupational Therapist                   Outcome Measures       Row Name 05/21/24 1010          How much help from another is currently needed...    Putting on and taking off regular lower body clothing? 4  -AC     Bathing (including washing, rinsing, and drying) 3  -AC     Toileting (which includes using toilet bed pan or urinal) 3  -AC     Putting on and taking off regular upper body clothing 4  -AC     Taking care of personal grooming (such as brushing teeth) 3  standign sink side  -AC     Eating meals 4  -AC     AM-PAC 6 Clicks Score (OT) 21  -AC       Row Name 05/21/24 0800          How much help from another person do you currently need...    Turning from your back to your side while in flat bed without using bedrails? 4  -SW     Moving from lying on back to sitting on the side of a flat bed without bedrails? 4  -SW     Moving to and from a bed to a chair (including a wheelchair)? 3  -SW     Standing up from a chair using your arms (e.g., wheelchair, bedside chair)? 3  -SW     Climbing 3-5 steps with a railing? 3  -SW     To walk in hospital room? 3  -SW     AM-PAC 6 Clicks Score (PT) 20  -SW     Highest Level of Mobility Goal 6 --> Walk 10 steps or more  -SW       Row Name 05/21/24 1010          Functional Assessment    Outcome Measure Options AM-PAC 6 Clicks Daily Activity (OT)  -AC               User Key  (r) = Recorded By, (t) = Taken By, (c) =  Cosigned By      Initials Name Provider Type     Gely Stahl, OT Occupational Therapist    Yamileth Bal RN Registered Nurse                    Occupational Therapy Education       Title: PT OT SLP Therapies (In Progress)       Topic: Occupational Therapy (In Progress)       Point: ADL training (In Progress)       Description:   Instruct learner(s) on proper safety adaptation and remediation techniques during self care or transfers.   Instruct in proper use of assistive devices.                  Learning Progress Summary             Patient Acceptance, E, NR by  at 5/21/2024 1011                         Point: Home exercise program (Not Started)       Description:   Instruct learner(s) on appropriate technique for monitoring, assisting and/or progressing therapeutic exercises/activities.                  Learner Progress:  Not documented in this visit.              Point: Precautions (Not Started)       Description:   Instruct learner(s) on prescribed precautions during self-care and functional transfers.                  Learner Progress:  Not documented in this visit.              Point: Body mechanics (Not Started)       Description:   Instruct learner(s) on proper positioning and spine alignment during self-care, functional mobility activities and/or exercises.                  Learner Progress:  Not documented in this visit.                              User Key       Initials Effective Dates Name Provider Type Discipline     02/03/23 -  Gely Stahl, OT Occupational Therapist OT                  OT Recommendation and Plan  Planned Therapy Interventions (OT): activity tolerance training, BADL retraining, functional balance retraining, occupation/activity based interventions, patient/caregiver education/training, transfer/mobility retraining  Therapy Frequency (OT): daily  Plan of Care Review  Plan of Care Reviewed With: patient, significant other  Outcome Evaluation: Pt presents slightly below  baseline d/t decr activity tolerance asssociated with drop in BP with change in position.  Pt ind LBD, sup STS, CGA to take 3 side steps toward HOB without AD.  OT eval limited d/t drop in BP with actiivty,  OT will follow  to advance pt toward PLOF.  Recommend home with assist upon d/c.     Time Calculation:   Evaluation Complexity (OT)  Review Occupational Profile/Medical/Therapy History Complexity: brief/low complexity  Assessment, Occupational Performance/Identification of Deficit Complexity: 1-3 performance deficits  Clinical Decision Making Complexity (OT): problem focused assessment/low complexity  Overall Complexity of Evaluation (OT): low complexity     Time Calculation- OT       Row Name 05/21/24 0925             Time Calculation- OT    OT Start Time 0925  -AC      OT Received On 05/21/24  -AC      OT Goal Re-Cert Due Date 05/31/24  -AC         Untimed Charges    OT Eval/Re-eval Minutes 48  -AC         Total Minutes    Untimed Charges Total Minutes 48  -AC       Total Minutes 48  -AC                User Key  (r) = Recorded By, (t) = Taken By, (c) = Cosigned By      Initials Name Provider Type    AC Gely Stahl, OT Occupational Therapist                  Therapy Charges for Today       Code Description Service Date Service Provider Modifiers Qty    [id number] HC OT EVAL LOW COMPLEXITY 4 5/21/2024 Gely Stahl OT GO 1                 Gely Stahl OT  5/21/2024

## 2024-05-22 DIAGNOSIS — C34.31 MALIGNANT NEOPLASM OF LOWER LOBE OF RIGHT LUNG: Primary | ICD-10-CM

## 2024-05-22 LAB
ALBUMIN SERPL-MCNC: 3 G/DL (ref 3.5–5.2)
ANION GAP SERPL CALCULATED.3IONS-SCNC: 15 MMOL/L (ref 5–15)
BASOPHILS # BLD AUTO: 0.03 10*3/MM3 (ref 0–0.2)
BASOPHILS NFR BLD AUTO: 0.2 % (ref 0–1.5)
BUN SERPL-MCNC: 33 MG/DL (ref 8–23)
BUN/CREAT SERPL: 8.5 (ref 7–25)
C DIFF GDH + TOXINS A+B STL QL IA.RAPID: POSITIVE
C DIFF TOX GENS STL QL NAA+PROBE: DETECTED
CALCIUM SPEC-SCNC: 8.3 MG/DL (ref 8.6–10.5)
CHLORIDE SERPL-SCNC: 102 MMOL/L (ref 98–107)
CO2 SERPL-SCNC: 16 MMOL/L (ref 22–29)
CORTIS SERPL-MCNC: 5.44 MCG/DL
CREAT SERPL-MCNC: 3.86 MG/DL (ref 0.76–1.27)
CRYPTOC AG CSF QL: NEGATIVE
DEPRECATED RDW RBC AUTO: 57.7 FL (ref 37–54)
EBV NA IGG SER IA-ACNC: >600 U/ML (ref 0–17.9)
EBV VCA IGG SER IA-ACNC: >600 U/ML (ref 0–17.9)
EBV VCA IGM SER IA-ACNC: <36 U/ML (ref 0–35.9)
EGFRCR SERPLBLD CKD-EPI 2021: 15.5 ML/MIN/1.73
EOSINOPHIL # BLD AUTO: 0.05 10*3/MM3 (ref 0–0.4)
EOSINOPHIL NFR BLD AUTO: 0.3 % (ref 0.3–6.2)
ERYTHROCYTE [DISTWIDTH] IN BLOOD BY AUTOMATED COUNT: 17 % (ref 12.3–15.4)
GLUCOSE SERPL-MCNC: 85 MG/DL (ref 65–99)
HCT VFR BLD AUTO: 28.1 % (ref 37.5–51)
HGB BLD-MCNC: 9.2 G/DL (ref 13–17.7)
IMM GRANULOCYTES # BLD AUTO: 0.33 10*3/MM3 (ref 0–0.05)
IMM GRANULOCYTES NFR BLD AUTO: 2.1 % (ref 0–0.5)
LYMPHOCYTES # BLD AUTO: 1.2 10*3/MM3 (ref 0.7–3.1)
LYMPHOCYTES NFR BLD AUTO: 7.8 % (ref 19.6–45.3)
MCH RBC QN AUTO: 30.6 PG (ref 26.6–33)
MCHC RBC AUTO-ENTMCNC: 32.7 G/DL (ref 31.5–35.7)
MCV RBC AUTO: 93.4 FL (ref 79–97)
MONOCYTES # BLD AUTO: 1.81 10*3/MM3 (ref 0.1–0.9)
MONOCYTES NFR BLD AUTO: 11.8 % (ref 5–12)
NEUTROPHILS NFR BLD AUTO: 11.94 10*3/MM3 (ref 1.7–7)
NEUTROPHILS NFR BLD AUTO: 77.8 % (ref 42.7–76)
NRBC BLD AUTO-RTO: 0 /100 WBC (ref 0–0.2)
PHOSPHATE SERPL-MCNC: 3.7 MG/DL (ref 2.5–4.5)
PLATELET # BLD AUTO: 290 10*3/MM3 (ref 140–450)
PMV BLD AUTO: 9.2 FL (ref 6–12)
POTASSIUM SERPL-SCNC: 4.2 MMOL/L (ref 3.5–5.2)
RBC # BLD AUTO: 3.01 10*6/MM3 (ref 4.14–5.8)
SERVICE CMNT-IMP: ABNORMAL
SODIUM SERPL-SCNC: 133 MMOL/L (ref 136–145)
WBC NRBC COR # BLD AUTO: 15.36 10*3/MM3 (ref 3.4–10.8)

## 2024-05-22 PROCEDURE — 82533 TOTAL CORTISOL: CPT | Performed by: INTERNAL MEDICINE

## 2024-05-22 PROCEDURE — 87177 OVA AND PARASITES SMEARS: CPT | Performed by: INTERNAL MEDICINE

## 2024-05-22 PROCEDURE — 80069 RENAL FUNCTION PANEL: CPT | Performed by: INTERNAL MEDICINE

## 2024-05-22 PROCEDURE — 94799 UNLISTED PULMONARY SVC/PX: CPT

## 2024-05-22 PROCEDURE — 87493 C DIFF AMPLIFIED PROBE: CPT | Performed by: INTERNAL MEDICINE

## 2024-05-22 PROCEDURE — 87449 NOS EACH ORGANISM AG IA: CPT | Performed by: INTERNAL MEDICINE

## 2024-05-22 PROCEDURE — 99222 1ST HOSP IP/OBS MODERATE 55: CPT | Performed by: INTERNAL MEDICINE

## 2024-05-22 PROCEDURE — 99232 SBSQ HOSP IP/OBS MODERATE 35: CPT | Performed by: INTERNAL MEDICINE

## 2024-05-22 PROCEDURE — 25010000002 HEPARIN (PORCINE) PER 1000 UNITS: Performed by: INTERNAL MEDICINE

## 2024-05-22 PROCEDURE — 94664 DEMO&/EVAL PT USE INHALER: CPT

## 2024-05-22 PROCEDURE — 87209 SMEAR COMPLEX STAIN: CPT | Performed by: INTERNAL MEDICINE

## 2024-05-22 PROCEDURE — 85025 COMPLETE CBC W/AUTO DIFF WBC: CPT | Performed by: INTERNAL MEDICINE

## 2024-05-22 RX ORDER — FAMOTIDINE 20 MG/1
20 TABLET, FILM COATED ORAL DAILY
Status: DISCONTINUED | OUTPATIENT
Start: 2024-05-23 | End: 2024-05-23 | Stop reason: HOSPADM

## 2024-05-22 RX ORDER — COSYNTROPIN 0.25 MG/ML
0.25 INJECTION, POWDER, FOR SOLUTION INTRAMUSCULAR; INTRAVENOUS ONCE
Status: COMPLETED | OUTPATIENT
Start: 2024-05-23 | End: 2024-05-23

## 2024-05-22 RX ORDER — L.ACID,PARA/B.BIFIDUM/S.THERM 8B CELL
1 CAPSULE ORAL DAILY
Status: DISCONTINUED | OUTPATIENT
Start: 2024-05-22 | End: 2024-05-23 | Stop reason: HOSPADM

## 2024-05-22 RX ORDER — SODIUM BICARBONATE 650 MG/1
1300 TABLET ORAL 3 TIMES DAILY
Status: DISCONTINUED | OUTPATIENT
Start: 2024-05-22 | End: 2024-05-23 | Stop reason: HOSPADM

## 2024-05-22 RX ORDER — VANCOMYCIN HYDROCHLORIDE 125 MG/1
125 CAPSULE ORAL EVERY 6 HOURS SCHEDULED
Status: DISCONTINUED | OUTPATIENT
Start: 2024-05-22 | End: 2024-05-23 | Stop reason: HOSPADM

## 2024-05-22 RX ADMIN — PRAVASTATIN SODIUM 80 MG: 40 TABLET ORAL at 20:56

## 2024-05-22 RX ADMIN — BUDESONIDE AND FORMOTEROL FUMARATE DIHYDRATE 2 PUFF: 160; 4.5 AEROSOL RESPIRATORY (INHALATION) at 09:47

## 2024-05-22 RX ADMIN — MIDODRINE HYDROCHLORIDE 10 MG: 10 TABLET ORAL at 09:29

## 2024-05-22 RX ADMIN — HEPARIN SODIUM 5000 UNITS: 5000 INJECTION INTRAVENOUS; SUBCUTANEOUS at 20:57

## 2024-05-22 RX ADMIN — ACETAMINOPHEN 650 MG: 325 TABLET ORAL at 23:45

## 2024-05-22 RX ADMIN — Medication 10 ML: at 09:21

## 2024-05-22 RX ADMIN — SODIUM BICARBONATE 650 MG TABLET 1300 MG: at 15:21

## 2024-05-22 RX ADMIN — FERROUS SULFATE TAB 325 MG (65 MG ELEMENTAL FE) 325 MG: 325 (65 FE) TAB at 09:21

## 2024-05-22 RX ADMIN — MEGESTROL ACETATE 20 MG: 20 TABLET ORAL at 09:21

## 2024-05-22 RX ADMIN — Medication 10 ML: at 20:58

## 2024-05-22 RX ADMIN — SODIUM BICARBONATE 650 MG TABLET 650 MG: at 09:21

## 2024-05-22 RX ADMIN — HEPARIN SODIUM 5000 UNITS: 5000 INJECTION INTRAVENOUS; SUBCUTANEOUS at 05:16

## 2024-05-22 RX ADMIN — BUDESONIDE AND FORMOTEROL FUMARATE DIHYDRATE 2 PUFF: 160; 4.5 AEROSOL RESPIRATORY (INHALATION) at 21:01

## 2024-05-22 RX ADMIN — SODIUM BICARBONATE 650 MG TABLET 1300 MG: at 20:56

## 2024-05-22 RX ADMIN — TIOTROPIUM BROMIDE INHALATION SPRAY 2 PUFF: 3.12 SPRAY, METERED RESPIRATORY (INHALATION) at 09:47

## 2024-05-22 RX ADMIN — VANCOMYCIN HYDROCHLORIDE 125 MG: 125 CAPSULE ORAL at 19:19

## 2024-05-22 RX ADMIN — PANTOPRAZOLE SODIUM 40 MG: 40 TABLET, DELAYED RELEASE ORAL at 05:16

## 2024-05-22 RX ADMIN — VANCOMYCIN HYDROCHLORIDE 125 MG: 125 CAPSULE ORAL at 23:45

## 2024-05-22 RX ADMIN — HEPARIN SODIUM 5000 UNITS: 5000 INJECTION INTRAVENOUS; SUBCUTANEOUS at 15:21

## 2024-05-22 RX ADMIN — Medication 1 CAPSULE: at 19:19

## 2024-05-22 NOTE — CASE MANAGEMENT/SOCIAL WORK
Continued Stay Note  [facility]     Patient Name: David Barfield  MRN: [id number]  Today's Date: 5/22/2024    Admit Date: 5/20/2024    Plan: home   Discharge Plan       Row Name 05/22/24 1421       Plan    Plan Comments Met with patient at the bedside to discuss discharge plan. Per MDR, patient may be medically ready for discharge soon. Patient's plan is home. No discharge needs identified. CM will continue to follow.    Final Discharge Disposition Code 01 - home or self-care                   Discharge Codes    No documentation.                 Expected Discharge Date and Time       Expected Discharge Date Expected Discharge Time    May 23, 2024               Aisha Jeffrey RN

## 2024-05-22 NOTE — PROGRESS NOTES
[facility]  PROGRESS NOTE    Patient Name: David Barfield  : 1950  MRN: [id number]    Date of Admission: 2024  Primary Care Physician: Hayley Castellanos APRN    Subjective   Subjective     CC: Follow-up weakness    HPI: No acute events overnight, patient rested well, feels better this morning.      Objective   Objective     Vital Signs:   Temp:  [97.2 °F (36.2 °C)-97.6 °F (36.4 °C)] 97.2 °F (36.2 °C)  Heart Rate:  [70-71] 70  Resp:  [15-18] 17  BP: (101-132)/(58-69) 127/66     Physical Exam:  Constitutional: Elderly male, in no acute distress, awake, alert  HENT: NCAT, mucous membranes moist  Respiratory: Clear to auscultation bilaterally, respiratory effort normal   Cardiovascular: RRR, no murmurs, rubs, or gallops  Gastrointestinal: Positive bowel sounds, soft, nontender, nondistended  Musculoskeletal: No bilateral ankle edema  Psychiatric: Appropriate affect, cooperative  Neurologic: Oriented x 3, nonfocal  Skin: No rashes    Results Reviewed:  LAB RESULTS:      Lab 24  0310 24  0448 24  1820 24  0940 24  0529   WBC 15.36* 17.18* 22.57* 21.85* 15.02*   HEMOGLOBIN 9.2* 8.9* 9.5* 10.0* 9.3*   HEMATOCRIT 28.1* 28.0* 29.8* 32.3* 30.4*   PLATELETS 290 288 324 326 306   NEUTROS ABS 11.94*  --  19.28* 19.05*  --    IMMATURE GRANS (ABS) 0.33*  --  0.31* 0.19*  --    LYMPHS ABS 1.20  --  0.94 1.02  --    MONOS ABS 1.81*  --  1.82* 1.37*  --    EOS ABS 0.05  --  0.16 0.16  --    MCV 93.4 92.7 91.7 94.7 95.9   PROCALCITONIN  --   --   --  1.03*  --    LACTATE  --   --   --  1.3  --          Lab 24  0310 24  0448 24  1820 24  0940 24  0529   SODIUM 133* 134* 133* 136 137   POTASSIUM 4.2 4.3 4.4 4.2 4.5   CHLORIDE 102 103 101 104 106   CO2 16.0* 17.0* 17.0* 19.0* 18.0*   ANION GAP 15.0 14.0 15.0 13.0 13.0   BUN 33* 37* 36* 34* 35*   CREATININE 3.86* 4.49* 4.57* 4.73* 5.67*   EGFR 15.5* 12.9* 12.7* 12.2*  9.8*   GLUCOSE 85 91 108* 118* 95   CALCIUM 8.3* 8.4* 8.9 9.2 8.5*   MAGNESIUM  --   --   --  1.5*  --    PHOSPHORUS 3.7  --   --   --   --          Lab 05/22/24  0310 05/20/24  1820 05/19/24  0940   TOTAL PROTEIN  --  7.6 7.3   ALBUMIN 3.0* 3.4* 3.6   GLOBULIN  --  4.2 3.7   ALT (SGPT)  --  9 9   AST (SGOT)  --  13 11   BILIRUBIN  --  0.2 0.2   ALK PHOS  --  101 98         Lab 05/19/24  0940   HSTROP T 33*                 Brief Urine Lab Results  (Last result in the past 365 days)        Color   Clarity   Blood   Leuk Est   Nitrite   Protein   CREAT   Urine HCG        05/19/24 1036 Yellow   Cloudy   Small (1+)   Large (3+)   Negative   100 mg/dL (2+)                   Microbiology Results Abnormal       Procedure Component Value - Date/Time    Blood Culture - Blood, Hand, Left [[id number]]  (Normal) Collected: 05/20/24 1810    Lab Status: Preliminary result Specimen: Blood from Hand, Left Updated: 05/21/24 1900     Blood Culture No growth at 24 hours    Blood Culture - Blood, Arm, Left [[id number]]  (Normal) Collected: 05/20/24 1800    Lab Status: Preliminary result Specimen: Blood from Arm, Left Updated: 05/21/24 1900     Blood Culture No growth at 24 hours    Blood Culture - Blood, Chest, Left [[id number]]  (Normal) Collected: 05/20/24 1815    Lab Status: Preliminary result Specimen: Blood from Chest, Left Updated: 05/21/24 1900     Blood Culture No growth at 24 hours    Gastrointestinal Panel, PCR - Stool, Per Rectum [[id number]]  (Normal) Collected: 05/21/24 0841    Lab Status: Final result Specimen: Stool from Per Rectum Updated: 05/21/24 1027     Campylobacter Not Detected     Plesiomonas shigelloides Not Detected     Salmonella Not Detected     Vibrio Not Detected     Vibrio cholerae Not Detected     Yersinia enterocolitica Not Detected     Enteroaggregative E. coli (EAEC) Not Detected     Enteropathogenic E. coli (EPEC) Not Detected     Enterotoxigenic E. coli (ETEC) lt/st Not Detected     Shiga-like  toxin-producing E. coli (STEC) stx1/stx2 Not Detected     Shigella/Enteroinvasive E. coli (EIEC) Not Detected     Cryptosporidium Not Detected     Cyclospora cayetanensis Not Detected     Entamoeba histolytica Not Detected     Giardia lamblia Not Detected     Adenovirus F40/41 Not Detected     Astrovirus Not Detected     Norovirus GI/GII Not Detected     Rotavirus A Not Detected     Sapovirus (I, II, IV or V) Not Detected            No radiology results from the last 24 hrs    Results for orders placed during the hospital encounter of 05/05/24    Adult Transesophageal Echo 3D (DAMIÁN) W/ Cont If Necessary Per Protocol    Interpretation Summary    Left ventricular systolic function is normal. Left ventricular ejection fraction appears to be 61 - 65%. Normal left ventricular cavity size noted. Left ventricular wall thickness is consistent with mild concentric hypertrophy. All left ventricular wall segments contract normally.    There is mild calcification of the aortic valve. The aortic valve appears trileaflet. Mild aortic valve regurgitation is present. No aortic valve stenosis is present. There is no evidence of an aortic valve mass is present.    There is mild calcification of the mitral valve. There is mild, bileaflet mitral valve thickening present. No evidence of a mitral valve mass is present. Mild to moderate mitral valve regurgitation is present. No significant mitral valve stenosis is present.    The tricuspid valve is structurally normal with no significant stenosis present. There is no evidence of a mass on the tricuspid valve. Trace tricuspid valve regurgitation is present.    The pulmonic valve is grossly normal in structure. There is trace pulmonic valve regurgitation present.    No vegetation seen on atrial aspect of pacemaker leads.  The most distal aspects of the RV lead were not completely visualized however there was no apparent vegetation in the more proximal segment, adjacent to the tricuspid valve  which appears to be functioning normally.      Current medications:  Scheduled Meds:budesonide-formoterol, 2 puff, Inhalation, BID - RT   And  tiotropium bromide monohydrate, 2 puff, Inhalation, Daily - RT  ferrous sulfate, 325 mg, Oral, Daily With Breakfast  heparin (porcine), 5,000 Units, Subcutaneous, Q8H  megestrol, 20 mg, Oral, Daily  pantoprazole, 40 mg, Oral, Q AM  pravastatin, 80 mg, Oral, Nightly  sodium bicarbonate, 650 mg, Oral, TID  sodium chloride, 10 mL, Intravenous, Q12H      Continuous Infusions:   PRN Meds:.  acetaminophen **OR** acetaminophen **OR** acetaminophen    senna-docusate sodium **AND** polyethylene glycol **AND** bisacodyl **AND** bisacodyl    Calcium Replacement - Follow Nurse / BPA Driven Protocol    HYDROcodone-acetaminophen    Magnesium Low Dose Replacement - Follow Nurse / BPA Driven Protocol    midodrine    ondansetron ODT **OR** ondansetron    Phosphorus Replacement - Follow Nurse / BPA Driven Protocol    Potassium Replacement - Follow Nurse / BPA Driven Protocol    sodium chloride    sodium chloride    Assessment & Plan   Assessment & Plan     Active Hospital Problems    Diagnosis  POA    **Weakness [R53.1]  Yes    Hypotension [I95.9]  Yes    Stage 4 chronic kidney disease [N18.4]  Yes    CAD (coronary artery disease) [I25.10]  Yes    Leukocytosis [D72.829]  Yes    Severe malnutrition [E43]  Yes    Malignant neoplasm of lower lobe of right lung [C34.31]  Yes      Resolved Hospital Problems   No resolved problems to display.        Brief Hospital Course to date:  David Barfield is a 74 y.o. male with hx of NSCLC s/p neoadjuvant chemo, RLL lobectomy 1/2024, subsequent immunotherapy with Opdivo (last dose ~4/4/24), CAD, HTN, AV block s/p PPM, hypotension, emphysema, tobacco use, CKD 4-5, with 4 admissions since last month. He was discharged on 5/17 on cefepime however returned to ED 5/19 with ongoing soa and hypotension. He left AMA and was admitted directly today at the request  of Dr. Derian Barry.     Hypotension, fluid responsive, resolved  -BP much improved s/p IVF,   -Recent workup included (but not limited to), TTE (normal EF without significant valvular disease), appropriate cortisol, normal TSH  -d/c home amlodipine indefinitely, continue midodrine     Recurrent treatment for presumed sepsis  --Dr. Barry to follow - had been on cefepime at home until 5/20.  - Dr. Barry rec'd obs off abx and checking BK, CMV, adenovirus, fungitel, EBV, GI PCR, blood cultures from port/peripheral, histo.     ARACELI on CKD stage 4   - His creatinine is better than it was when he left last week.  Continues to improve  - Nephrology consulted recommend oral hydration, sodium bicarb tabs and continue midodrine as needed    NSCLC  --s/p neoadjuvant chemo, RLL lobectomy 1/2024, immunotherapy (last dose Opdivo 4/4/24)  --Follows with Dr. Ashley, further treatment on hold due to renal and infectious issues. She is consulted     CAD  AV block s/p PPM  Hx HTN  --Continue statin, holding Amlodipine due to hypotension  -Family requested cardiology consult, patient seen by jamie De La O to continue IV fluids, hold antihypertensives, continue midodrine     Chronic anemia  --Monitor, stable overall  -Continue iron supplement     Emphysema  Tobacco abuse  --Trelegy substitute   --PRN nebs     Impaired glucose tolerance  --Last HbA1c 6.3%    Severe malnutrition    Expected Discharge Location and Transportation: Home with assist  Expected Discharge   Expected Discharge Date: 5/23/2024; Expected Discharge Time:      DVT prophylaxis:  Medical DVT prophylaxis orders are present.         AM-PAC 6 Clicks Score (PT): 20 (05/21/24 1042)    CODE STATUS:   Code Status and Medical Interventions:   Ordered at: 05/20/24 0547     Code Status (Patient has no pulse and is not breathing):    CPR (Attempt to Resuscitate)     Medical Interventions (Patient has pulse or is breathing):    Full Support       Roc  MD Bobby  05/22/24

## 2024-05-22 NOTE — PROGRESS NOTES
INFECTIOUS DISEASE Progress note    David Barfield  1950  [id number]    Date of consult: 5/21/24    Admit date: 5/20/2024    Requesting Provider: Derian Barry MD  Evaluating physician: Derian Barry MD  Reason for Consultation: known to patient. Sent from clinic  Chief Complaint: fatigue, poor appetite.      Subjective   History of present illness:  David Barfield is a  74 y.o.  Yr old male with PMH NSCLC/RLL lobectomy (1/2024) recently on Opdivo (last dose on 4/4/24), HTN, and emphysema who I have followed during multiple recent admissions after he presented with vague abdominal pain, nausea, vomiting, diarrhea, severe leukocytosis with neutrophilia, and recent renal dysfunction. The patient was recently on cefepime 2 g IV every 24 hours empirically for sepsis of unclear etiology.  He remained on cefepime for about 2 weeks after his most recent hospitalization. There has been concern for bilateral pyelonephritis based on imaging and some of his symptoms.  He did initially seem to respond to cefepime after his first admission with a downtrend of his white blood cell count and improvement in his symptoms.  Renal function also improved from a creatinine greater than 8 down to a creatinine just over 2 during his initial admission.  He has recently been admitted to additional times with subsequent improvement during his hospitalizations and then he declines after discharge.  Multiple sets of blood cultures and urine cultures have been no growth.  He has had some pyuria on his urinalyses.  He is also intermittently had diarrhea but C. Difficile testing and GI PCR panels have been negative. I additionally sent a karius test from his blood during 1 of his previous admissions and this was negative although antibiotic modified.  Plan after his last hospitalization was to treat him empirically with a course of cefepime for up to 4 weeks to see if he responded.  There is also been some concern that prior  immunotherapy without Depo contribute into his presentation.  He did present back to the ER a couple nights ago and was offered admission but declined and left AMA from the ER.  His white blood cell count was again elevated to 21.85 and his creatinine was 4.73 during his ER visit.  Pro-calcitonin was also slightly elevated to 1.03.  Repeat urine cultures and blood cultures remain no growth to date.  Repeat CT abdomen and pelvis showed signs of colitis with no abscess.  I reevaluate the patient in clinic yesterday and he was not feeling well and his blood pressure was noted to be in the 80s over 50s.  He stated that he was not eating well at home and he remained nauseous with some recent recurrence of his diarrhea.  He is not having any fevers or chills.  His urine output and also reportedly decreased but he was still urinating.  I decided to directly admit the patient to the hospital for further evaluation given his worsening symptoms.  Antibiotics have currently been held as I want to see how he does off of antibiotics as it is unclear if this is actually an infectious process.  I did find a case report of anti-tubular basement membrane antibody disease associated with Opdivo infusion and concomitant acute pyelonephritis leading to acute kidney injury.     Since arrival, the patient is feeling slightly better.  Still having some diarrhea although somewhat better today.  Still having some intermittent nausea and poor appetite.  He remains afebrile.  Blood pressure has improved since arrival and his Midodrine dosing has been adjusted by nephrology. White blood cell count has trended down to 17.18 off of antibiotics.  Creatinine is 4.49, down from 4.57 yesterday.  GI PCR panel was negative.  He does have a BK virus PCR from his urine, urine histo antigen, CMV PCR from his blood, adenovirus PCR from his blood, Fungitell BDG, EBV antibody profile pending. Recent ANCA panel was negative. Blood cultures are in progress.  Urine culture from 5/19 was no growth final.    Of note, the patient does admit to bird watching and some close contact with birds.  He had not previously admitted to this.  He does not endorse any other zoonotic exposures.     Subjective:    5/22/24: The patient does feel slightly better.  Appetite is improved somewhat and nausea is better.  Diarrhea has resolved today.  No fevers.  No abdominal pain or flank pain. Hypotension has improved and he only had 1 blood pressure recently that was lower than 100 systolic. C. Difficile test was pending at the time of my exam but has now resulted positive.  White blood cell count trended down to 15.36 today.  Creatinine improved to 3.86.    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    Coronary artery disease 2019    Diabetes mellitus Borderline    Emphysema/COPD     Erectile disorder     GERD (gastroesophageal reflux disease)     History of chemotherapy     Hyperlipidemia     Hypertension 2019    Lung nodule     Mumps     Mumps     Pruritus     after bath    Slow to wake up after anesthesia     Stage 5 chronic kidney disease not on chronic dialysis 5/14/2024    Wears dentures     upper only    Wears hearing aid in both ears     usually only wears right       Past Surgical History:   Procedure Laterality Date    BONE BIOPSY      broken bone surgery in his face    BRONCHOSCOPY THORACOTOMY Right 01/09/2024    Procedure: THORACOTOMY FOR LOWER LOBECTOMY AND MEDISTINAL LYMPH NODE DISSECTION RIGHT;  Surgeon: Joey Patel MD;  Location: [facility] OR;  Service: Cardiothoracic;  Laterality: Right;    BRONCHOSCOPY WITH ION ROBOTIC ASSIST N/A 09/15/2023    Procedure: BRONCHOSCOPY NAVIGATION WITH ENDOBRONCHIAL ULTRASOUND AND ION ROBOT;  Surgeon: Octaviano Sampson MD;  Location: [facility] ENDOSCOPY;  Service: Robotics - Pulmonary;  Laterality: N/A;  ion #6 - [id number]  -  "[id number]  Cath guide 0061    EBUS balloon removed and intact    CARDIAC ELECTROPHYSIOLOGY PROCEDURE N/A 08/17/2021    Procedure: Pacemaker DC new;  Surgeon: Kayy Box MD;  Location: [facility] INVASIVE LOCATION;  Service: Cardiology;  Laterality: N/A;    FACIAL FRACTURE SURGERY      LYMPH NODE BIOPSY  2023    PACEMAKER IMPLANTATION         Pediatric History   Patient Parents    Not on file     Other Topics Concern    Not on file   Social History Narrative    Lives in [place], [place]       family history includes Aneurysm in his mother; Cancer in his sister; Dementia in his father; Heart disease in his paternal grandmother; Hypertension in his paternal grandfather; Leukemia in his sister.    Allergies   Allergen Reactions    Cymbalta [Duloxetine Hcl] GI Intolerance    Gabapentin Mental Status Change     Pt states that this medication \"makes him feel foolish in his head\".     Remeron [Mirtazapine] Other (See Comments)     Excess sedation    Toradol [Ketorolac Tromethamine] GI Intolerance     Projectile vomiting     Latex Other (See Comments)     Latex allergy     Tape Rash       Immunization History   Administered Date(s) Administered    ABRYSVO (RSV, 60+ or pregnant women 32-36 wks) 10/16/2023    COVID-19 (PFIZER) BIVALENT 12+YRS 09/16/2022    COVID-19 (PFIZER) Purple Cap Monovalent 01/29/2021, 02/19/2021, 10/18/2021, 04/14/2022    COVID-19 F23 (PFIZER) 12YRS+ (COMIRNATY) 09/26/2023    Fluzone High-Dose 65+yrs 10/06/2023    Pneumococcal Conjugate 20-Valent (PCV20) 10/11/2023       Medication:    Current Facility-Administered Medications:     acetaminophen (TYLENOL) tablet 650 mg, 650 mg, Oral, Q4H PRN **OR** acetaminophen (TYLENOL) 160 MG/5ML oral solution 650 mg, 650 mg, Oral, Q4H PRN **OR** acetaminophen (TYLENOL) suppository 650 mg, 650 mg, Rectal, Q4H PRN, Carey Huggins II,     sennosides-docusate (PERICOLACE) 8.6-50 MG per tablet 2 tablet, 2 tablet, Oral, BID PRN **AND** polyethylene glycol " (MIRALAX) packet 17 g, 17 g, Oral, Daily PRN **AND** bisacodyl (DULCOLAX) EC tablet 5 mg, 5 mg, Oral, Daily PRN **AND** bisacodyl (DULCOLAX) suppository 10 mg, 10 mg, Rectal, Daily PRN, Carey Huggins II, DO    budesonide-formoterol (SYMBICORT) 160-4.5 MCG/ACT inhaler 2 puff, 2 puff, Inhalation, BID - RT, 2 puff at 05/22/24 0947 **AND** tiotropium (SPIRIVA RESPIMAT) 2.5 mcg/act aerosol solution inhaler, 2 puff, Inhalation, Daily - RT, Carey Huggins II, DO, 2 puff at 05/22/24 0947    Calcium Replacement - Follow Nurse / BPA Driven Protocol, , Does not apply, PRN, Carey Huggins II, DO    [START ON 5/23/2024] ACTH, , , AM Draw **AND** [START ON 5/23/2024] Cortisol, , , Q30 Min **AND** [START ON 5/23/2024] cosyntropin (CORTROSYN) injection 0.25 mg, 0.25 mg, Intravenous, Once, [place], Kurt Martinez MD    ferrous sulfate tablet 325 mg, 325 mg, Oral, Daily With Breakfast, Roc Rosales MD, 325 mg at 05/22/24 0921    heparin (porcine) 5000 UNIT/ML injection 5,000 Units, 5,000 Units, Subcutaneous, Q8H, Carey Huggins II, DO, 5,000 Units at 05/22/24 1521    HYDROcodone-acetaminophen (NORCO) 7.5-325 MG per tablet 1 tablet, 1 tablet, Oral, Q6H PRN, Carey Huggins II, DO    Magnesium Low Dose Replacement - Follow Nurse / BPA Driven Protocol, , Does not apply, PRN, Roc Rosales MD    megestrol (MEGACE) tablet 20 mg, 20 mg, Oral, Daily, Roc Rosales MD, 20 mg at 05/22/24 0921    midodrine (PROAMATINE) tablet 10 mg, 10 mg, Oral, TID PRN, Carey Huggins II, DO, 10 mg at 05/22/24 0929    ondansetron ODT (ZOFRAN-ODT) disintegrating tablet 4 mg, 4 mg, Oral, Q6H PRN, 4 mg at 05/20/24 1955 **OR** ondansetron (ZOFRAN) injection 4 mg, 4 mg, Intravenous, Q6H PRN, Carey Huggins II, DO    pantoprazole (PROTONIX) EC tablet 40 mg, 40 mg, Oral, Q AM, Carey Huggins II, DO, 40 mg at 05/22/24 0516    Phosphorus Replacement - Follow Nurse / BPA Driven Protocol, , Does not apply, PRN, Carey Huggins II, DO    Potassium  "Replacement - Follow Nurse / BPA Driven Protocol, , Does not apply, PRN, Bhavna, Carey M II, DO    pravastatin (PRAVACHOL) tablet 80 mg, 80 mg, Oral, Nightly, Bhavna, Carey M II, DO, 80 mg at 05/21/24 2025    sodium bicarbonate tablet 1,300 mg, 1,300 mg, Oral, TID, [place], Kurt Martinez MD, 1,300 mg at 05/22/24 1521    sodium chloride 0.9 % flush 10 mL, 10 mL, Intravenous, Q12H, Bhavna, Carey M II, DO, 10 mL at 05/22/24 0921    sodium chloride 0.9 % flush 10 mL, 10 mL, Intravenous, PRN, Bhavna, Carey M II, DO    sodium chloride 0.9 % infusion 40 mL, 40 mL, Intravenous, PRN, Bhavna, Carey M II, DO    Please refer to the medical record for a full medication list    Review of Systems:    As above    Physical Exam:   Vital Signs   Temp:  [97.2 °F (36.2 °C)-99 °F (37.2 °C)] 99 °F (37.2 °C)  Heart Rate:  [70-73] 70  Resp:  [16-18] 18  BP: ()/(58-67) 109/67    Temp  Min: 97.2 °F (36.2 °C)  Max: 99 °F (37.2 °C)  BP  Min: 95/63  Max: 127/66  Pulse  Min: 70  Max: 73  Resp  Min: 16  Max: 18  SpO2  Min: 95 %  Max: 97 %    Blood pressure 109/67, pulse 70, temperature 99 °F (37.2 °C), temperature source Oral, resp. rate 18, height 182.9 cm (72\"), weight 76.8 kg (169 lb 6.4 oz), SpO2 97%.  GENERAL: Awake and alert, in no acute distress. Appears stated age. Slightly less frail-appearing  HEENT:  Normocephalic, atraumatic. No external oral lesions noted  EYES: PERRL. No conjunctival injection. No icterus.   HEART: RRR, no murmur  LUNGS: Clear to auscultation and percussion. No respiratory distress, no use of accessory muscles.  ABDOMEN: Soft, nontender, nondistended. No appreciable HSM.  Bowel sounds normal.  GENITAL: no Yeung catheter  SKIN: no generalized rashes.  No peripheral stigmata of infective endocarditis noted.  PSYCHIATRIC: cooperative.  Appropriate mood and affect  EXT:  No cellulitic change.  NEURO: awake alert and oriented ×4.  Normal speech and cognition    AICD pocket site is without any erythema or " "tenderness    Left chest wall Mediport site is without any erythema or tenderness    Results Review:   I reviewed the patient's new clinical results.  I reviewed the patient's new imaging results and agree with the interpretation.  I reviewed the patient's other test results and agree with the interpretation    Results from last 7 days   Lab Units 05/22/24  0310 05/21/24  0448 05/20/24  1820   WBC 10*3/mm3 15.36* 17.18* 22.57*   HEMOGLOBIN g/dL 9.2* 8.9* 9.5*   HEMATOCRIT % 28.1* 28.0* 29.8*   PLATELETS 10*3/mm3 290 288 324     Results from last 7 days   Lab Units 05/22/24  0310   SODIUM mmol/L 133*   POTASSIUM mmol/L 4.2   CHLORIDE mmol/L 102   CO2 mmol/L 16.0*   BUN mg/dL 33*   CREATININE mg/dL 3.86*   GLUCOSE mg/dL 85   CALCIUM mg/dL 8.3*     Results from last 7 days   Lab Units 05/20/24  1820   ALK PHOS U/L 101   BILIRUBIN mg/dL 0.2   ALT (SGPT) U/L 9   AST (SGOT) U/L 13                 Results from last 7 days   Lab Units 05/19/24  0940   LACTATE mmol/L 1.3     Estimated Creatinine Clearance: 18 mL/min (A) (by C-G formula based on SCr of 3.86 mg/dL (H)).  CPK          5/6/2024    02:07   Common Labs   Creatine Kinase 12       Procalitonin Results:      Lab 05/19/24  0940   PROCALCITONIN 1.03*      Brief Urine Lab Results  (Last result in the past 365 days)        Color   Clarity   Blood   Leuk Est   Nitrite   Protein   CREAT   Urine HCG        05/19/24 1036 Yellow   Cloudy   Small (1+)   Large (3+)   Negative   100 mg/dL (2+)                  No results found for: \"SITE\", \"ALLENTEST\", \"PHART\", \"DIT2FAE\", \"PO2ART\", \"GOL2CPI\", \"BASEEXCESS\", \"V2MPADTZ\", \"HGBBG\", \"HCTABG\", \"OXYHEMOGLOBI\", \"METHHGBN\", \"CARBOXYHGB\", \"CO2CT\", \"BAROMETRIC\", \"MODALITY\", \"FIO2\"     Microbiology:  Blood Culture   Date Value Ref Range Status   05/19/2024 No growth at 2 days  Preliminary       Urine Culture   Date Value Ref Range Status   05/19/2024 No growth  Final     No results found for: \"VIRALCULTU\", \"WOUNDCX\"     Blood Culture   Date " Value Ref Range Status   05/19/2024 No growth at 2 days  Preliminary     Urine Culture   Date Value Ref Range Status   05/19/2024 No growth  Final   (      Radiology:  Imaging Results (Last 72 Hours)       ** No results found for the last 72 hours. **            IMPRESSION:     Problems:  Acute renal failure-Creatinine has been fluctuating over the last few weeks.  Slightly better than yesterday. Unclear if this could be antitubular basement membrane antibody disease associated with Opdivo infusion. There is a case report in the literature of this occurring with concomitant acute pyelonephritis leading to acute kidney injury. Recent hypotension likely contributing. Improving  Leukocytosis with neutrophilia-unclear etiology.  Initial concern for pyelonephritis but all culture data has been negative.   Interestingly his white blood cell count has trended down off of antibiotics. C. Difficile could be contributing.   Hypotension-now improved.  Blood pressure medications being held.  Midodrin dose being adjusted. Possibly due to adrenal insufficiency  Nausea  Diarrhea- Improved today.  C. Difficile test was positive with positive toxin PCR and antigen test. Oral vancomycin started.  Probiotic.  Continue off other antibiotics  Normocytic anemia  Pyuria/perinephric stranding-repeat urine culture on 5/19 was again no growth but still with significant pyuria.  NSCLC/RLL lobectomy in January 2024, recently on Opdivo (last dose on 4/4/24)    RECOMMENDATIONS:    -continue to closely monitor cbc and bmp  -sent fungal and AFB culture from the urine-In progress  -follow repeat blood cultures-No growth to date  -follow BK virus PCR from his urine, urine histo antigen, CMV PCR from his blood, adenovirus PCR from his blood, Fungitell BDG, and QuantiFERON Gold TB test.  Serum cryptococcal antigen was negative  -Nephrology is following.  Plan for ACTH stimulation test tomorrow  -Start vancomycin 125 mg by mouth every 6 hours for C.  Difficile  -Probiotic  -supportive care per primary team and nephrology  -Spore precautions    UM/XAVI:  I would be okay with the patient's discharge soon if he continues to improve and he is cleared by nephrology and other teams. Will plan to give him vancomycin 125 mg by mouth every 6 hours ×2 weeks for now.  I could see him back in my clinic within 2 weeks of his discharge.    I discussed in length with the patient and his girlfriend at bedside today    I discussed his complex case in length with Dr. Rosales today    Thank you for asking me to see David Barfield.        Derian Barry MD  5/22/2024

## 2024-05-22 NOTE — CONSULTS
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  He had residual disease involving the right lower lobe with 60% residual viable tumor and positive treatment effect.  Margins were negative.  Lymph nodes negative.  He initiated immunotherapy maintenance in February 2024.  He received his most recent dose Opdivo 4/4/2024.  He had a PET/CT in early April showing very low level activity in the paratracheal and supraclavicular lymph node that is very close to background mediastinal/hepatic uptake. Compared to his prior malignancy, the degree of FDG uptake is markedly lower and serial observation planned.      He was then admitted from 4/12/2024 through 4/22/2024 with severe sepsis which was attributed to bilateral pyelonephritis in the context of abnormal CT scan findings.  His cultures were negative.  However clinically he improved on antibiotics and he received a 10-day course of cefepime.He was re-admitted from 4/24/2024 through 4/29/2024 with recurrent symptoms which again resolved with cefepime.  He was discharged on oral Levaquin.  He had normalization of his white blood cell count and improvement in his kidney function with IV antibiotics.  Unfortunately he represented with sepsis on 5/5/2024-5/13/24 and was discharged on a prolonged course of cefepime. He is now admitted with c diff colitis and continued ARACELI. There is some concern for adrenal insufficiency and labs cha stim test is pending.     Imaging  "shows stable post surgical change in the lung as well as a 3 cm subcarinal LN and diffuse colitis.     Allergies   Allergen Reactions    Cymbalta [Duloxetine Hcl] GI Intolerance    Gabapentin Mental Status Change     Pt states that this medication \"makes him feel foolish in his head\".     Remeron [Mirtazapine] Other (See Comments)     Excess sedation    Toradol [Ketorolac Tromethamine] GI Intolerance     Projectile vomiting     Latex Other (See Comments)     Latex allergy     Tape Rash       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    Coronary artery disease 2019    Diabetes mellitus Borderline    Emphysema/COPD     Erectile disorder     GERD (gastroesophageal reflux disease)     History of chemotherapy     Hyperlipidemia     Hypertension 2019    Lung nodule     Mumps     Mumps     Pruritus     after bath    Slow to wake up after anesthesia     Stage 5 chronic kidney disease not on chronic dialysis 5/14/2024    Wears dentures     upper only    Wears hearing aid in both ears     usually only wears right         Current Facility-Administered Medications:     acetaminophen (TYLENOL) tablet 650 mg, 650 mg, Oral, Q4H PRN **OR** acetaminophen (TYLENOL) 160 MG/5ML oral solution 650 mg, 650 mg, Oral, Q4H PRN **OR** acetaminophen (TYLENOL) suppository 650 mg, 650 mg, Rectal, Q4H PRN, Carey Huggins II, DO    budesonide-formoterol (SYMBICORT) 160-4.5 MCG/ACT inhaler 2 puff, 2 puff, Inhalation, BID - RT, 2 puff at 05/22/24 0947 **AND** tiotropium (SPIRIVA RESPIMAT) 2.5 mcg/act aerosol solution inhaler, 2 puff, Inhalation, Daily - RT, Carey Huggins II, DO, 2 puff at 05/22/24 0947    Calcium Replacement - Follow Nurse / BPA Driven Protocol, , Does not apply, PRN, Carey Huggins II, DO    [START ON 5/23/2024] ACTH, , , AM Draw **AND** [START ON 5/23/2024] Cortisol, , , Q30 Min **AND** [START ON 5/23/2024] " cosyntropin (CORTROSYN) injection 0.25 mg, 0.25 mg, Intravenous, Once, [place], Kurt Martinez MD    [START ON 5/23/2024] famotidine (PEPCID) tablet 20 mg, 20 mg, Oral, Daily, Susanna Mays, RP    ferrous sulfate tablet 325 mg, 325 mg, Oral, Daily With Breakfast, Roc Rosales MD, 325 mg at 05/22/24 0921    heparin (porcine) 5000 UNIT/ML injection 5,000 Units, 5,000 Units, Subcutaneous, Q8H, Carey Huggins II, DO, 5,000 Units at 05/22/24 1521    HYDROcodone-acetaminophen (NORCO) 7.5-325 MG per tablet 1 tablet, 1 tablet, Oral, Q6H PRN, Carey Huggins II, DO    lactobacillus acidophilus (RISAQUAD) capsule 1 capsule, 1 capsule, Oral, Daily, Derian Barry MD, 1 capsule at 05/22/24 1919    Magnesium Low Dose Replacement - Follow Nurse / BPA Driven Protocol, , Does not apply, PRN, Roc Rosales MD    megestrol (MEGACE) tablet 20 mg, 20 mg, Oral, Daily, Roc Rosales MD, 20 mg at 05/22/24 0921    midodrine (PROAMATINE) tablet 10 mg, 10 mg, Oral, TID PRN, Carey Huggins II, DO, 10 mg at 05/22/24 0929    ondansetron ODT (ZOFRAN-ODT) disintegrating tablet 4 mg, 4 mg, Oral, Q6H PRN, 4 mg at 05/20/24 1955 **OR** ondansetron (ZOFRAN) injection 4 mg, 4 mg, Intravenous, Q6H PRN, Carey Huggins II, DO    Pharmacy Consult, , Does not apply, Continuous PRN, Derian Barry MD    Phosphorus Replacement - Follow Nurse / BPA Driven Protocol, , Does not apply, PRN, Carey Huggins II, DO    Potassium Replacement - Follow Nurse / BPA Driven Protocol, , Does not apply, PRN, Carey Huggins II, DO    pravastatin (PRAVACHOL) tablet 80 mg, 80 mg, Oral, Nightly, Carey Huggins II, DO, 80 mg at 05/21/24 2025    sodium bicarbonate tablet 1,300 mg, 1,300 mg, Oral, TID, [place] MD, 1,300 mg at 05/22/24 1521    sodium chloride 0.9 % flush 10 mL, 10 mL, Intravenous, Q12H, Carey Huggins II, DO, 10 mL at 05/22/24 0921    sodium chloride 0.9 % flush 10 mL, 10 mL, Intravenous, PRN, Carey Huggins II, DO     sodium chloride 0.9 % infusion 40 mL, 40 mL, Intravenous, PRN, Carey Huggins II, DO    vancomycin (VANCOCIN) capsule 125 mg, 125 mg, Oral, Q6H, Derian Barry MD, 125 mg at 05/22/24 1919    Past Surgical History:   Procedure Laterality Date    BONE BIOPSY      broken bone surgery in his face    BRONCHOSCOPY THORACOTOMY Right 01/09/2024    Procedure: THORACOTOMY FOR LOWER LOBECTOMY AND MEDISTINAL LYMPH NODE DISSECTION RIGHT;  Surgeon: Joey Patel MD;  Location:  [facility];  Service: Cardiothoracic;  Laterality: Right;    BRONCHOSCOPY WITH ION ROBOTIC ASSIST N/A 09/15/2023    Procedure: BRONCHOSCOPY NAVIGATION WITH ENDOBRONCHIAL ULTRASOUND AND ION ROBOT;  Surgeon: Octaviano Sampson MD;  Location:  [facility];  Service: Robotics - Pulmonary;  Laterality: N/A;  ion #6 - 0032  - 0015  Cath guide 0061    EBUS balloon removed and intact    CARDIAC ELECTROPHYSIOLOGY PROCEDURE N/A 08/17/2021    Procedure: Pacemaker DC new;  Surgeon: Kayy Box MD;  Location:  [facility];  Service: Cardiology;  Laterality: N/A;    FACIAL FRACTURE SURGERY      LYMPH NODE BIOPSY  2023    PACEMAKER IMPLANTATION         Social History     Socioeconomic History    Marital status: Single    Number of children: 3   Tobacco Use    Smoking status: Every Day     Current packs/day: 0.50     Average packs/day: 0.5 packs/day for 56.4 years (28.7 ttl pk-yrs)     Types: Cigarettes     Start date: 1/1/1968    Smokeless tobacco: Never    Tobacco comments:     Still smoke   Vaping Use    Vaping status: Never Used   Substance and Sexual Activity    Alcohol use: Never    Drug use: Never    Sexual activity: Yes     Partners: Female     Birth control/protection: None       Family History   Problem Relation Age of Onset    Aneurysm Mother         brain    Dementia Father     Leukemia Sister     Heart disease Paternal Grandmother     Hypertension Paternal Grandfather     Cancer Sister        Oncology/Hematology  History   Malignant neoplasm of lower lobe of right lung   10/4/2023 Initial Diagnosis    Malignant neoplasm of lower lobe of right lung     10/4/2023 Cancer Staged    Staging form: Lung, AJCC 8th Edition  - Clinical: Stage IIIA (cT2b, cN2, cM0) - Signed by Neetu Ashley MD on 10/4/2023     10/23/2023 - 12/5/2023 Chemotherapy    OP LUNG  Nivolumab 360mg /  PACLitaxel / CARBOplatin AUC=5      10/23/2023 -  Chemotherapy    OP CENTRAL VENOUS ACCESS DEVICE Access, Care, and Maintenance (CVAD)     2/6/2024 - 2/27/2024 Chemotherapy    OP LUNG Atezolizumab     4/4/2024 Biopsy    OP LUNG Nivolumab 480 mg  Plan Provider: Neetu Ashley MD  Treatment goal: Curative  Line of treatment: [No plan line of treatment]           REVIEW OF SYSTEMS:  A 14 point review of systems was performed and is negative except as noted below.    Review of Systems - Oncology      Objective     Vitals:    05/22/24 0920 05/22/24 0947 05/22/24 0948 05/22/24 1512   BP: 95/63   109/67   BP Location: Right arm   Right arm   Patient Position: Lying   Lying   Pulse:  73 70    Resp: 18 17  18   Temp: 97.8 °F (36.6 °C)   99 °F (37.2 °C)   TempSrc: Oral   Oral   SpO2:  97% 97%    Weight:       Height:                       Temp:  [97.2 °F (36.2 °C)-99 °F (37.2 °C)] 99 °F (37.2 °C)     Performance Status:     Physical Exam    General: well appearing male in no acute distress  HEENT: sclerae anicteric, oropharynx clear  Lymphatics: no cervical, supraclavicular, or axillary adenopathy  Cardiovascular: regular rate and rhythm, no murmurs  Lungs: clear to auscultation bilaterally  Abdomen: soft, nontender, nondistended.  No palpable organomegaly  Extremities: no lower extremity edema  Skin: no rashes, lesions, bruising, or petechiae      LABS:    Lab Results   Component Value Date    HGB 9.2 (L) 05/22/2024    HCT 28.1 (L) 05/22/2024    MCV 93.4 05/22/2024     05/22/2024    WBC 15.36 (H) 05/22/2024    NEUTROABS 11.94 (H) 05/22/2024    LYMPHSABS 1.20  05/22/2024    MONOSABS 1.81 (H) 05/22/2024    EOSABS 0.05 05/22/2024    BASOSABS 0.03 05/22/2024     Lab Results   Component Value Date    GLUCOSE 85 05/22/2024    BUN 33 (H) 05/22/2024    CREATININE 3.86 (H) 05/22/2024     (L) 05/22/2024    K 4.2 05/22/2024     05/22/2024    CO2 16.0 (L) 05/22/2024    CALCIUM 8.3 (L) 05/22/2024    PROTEINTOT 7.6 05/20/2024    ALBUMIN 3.0 (L) 05/22/2024    BILITOT 0.2 05/20/2024    ALKPHOS 101 05/20/2024    AST 13 05/20/2024    ALT 9 05/20/2024         IMAGING    CT Chest Without Contrast Diagnostic    Result Date: 5/19/2024  CT CHEST WO CONTRAST DIAGNOSTIC, CT ABDOMEN PELVIS WO CONTRAST Date of Exam: 5/19/2024 11:23 AM EDT Indication: soa. lung ca. being tx for sepsis.. Comparison: 5/6/2024 Technique: Axial CT images were obtained of the chest, abdomen, and pelvis without contrast administration.  Reconstructed coronal and sagittal images were also obtained. Automated exposure control and iterative construction methods were used. Findings: Stable biapical scarring more pronounced on the right again noted. Subpleural blebs. Right-sided volume loss related to postsurgical changes. Soft tissue along the right suture line has decreased compared with the examination performed on 4/24/2024 but is stable compared with 5/6/2024 and decreased compared with 2/21/2024 possibly postsurgical changes. Residual underlying tumor not excluded. This measures 4.3 cm x 1.7 cm. Small right pleural effusion. Calcified granulomata. No pneumothorax. Calcified mediastinal and hilar lymph nodes consistent with prior granulomatous disease. 3.1 cm x 1.8 cm subcarinal lymph node. Severe atheromatous disease of the coronary vessels. Liver:No masses. No intrahepatic biliary ductal dilatation. Spleen:No masses. No perisplenic hematoma. Pancreas:No pancreatic masses. No evidence of pancreatitis. Gallbladder and common bile duct:No evidence of cholelithiasis. No evidence of cholecystitis. Adrenal  glands:No adrenal masses Kidneys and ureters:No kidney stones. No renal masses.No calculi present within the ureters. Normal caliber ureters. Urinary bladder:No urinary bladder wall thickening. No bladder masses. Small bowel:Normal caliber small bowel. Large bowel: Diffuse large bowel wall thickening with surrounding infiltration of the fat consistent with colitis. Appendix: Normal GENITOURINARY: Normal prostate Ascites or pneumoperitoneum:None. Adenopathy: No adenopathy within the pelvis. Osseous structures: Degenerative changes of the hips and lumbar spine. No lytic or blastic disease is definitively identified. Other findings: None     Colitis. No abscess formation or perforation. Small right pleural effusion. Stable appearance of the right lower lobe density. Electronically Signed: Kam Foy MD  5/19/2024 11:43 AM EDT  Workstation ID: [id number]    CT Abdomen Pelvis Without Contrast    Result Date: 5/19/2024  CT CHEST WO CONTRAST DIAGNOSTIC, CT ABDOMEN PELVIS WO CONTRAST Date of Exam: 5/19/2024 11:23 AM EDT Indication: soa. lung ca. being tx for sepsis.. Comparison: 5/6/2024 Technique: Axial CT images were obtained of the chest, abdomen, and pelvis without contrast administration.  Reconstructed coronal and sagittal images were also obtained. Automated exposure control and iterative construction methods were used. Findings: Stable biapical scarring more pronounced on the right again noted. Subpleural blebs. Right-sided volume loss related to postsurgical changes. Soft tissue along the right suture line has decreased compared with the examination performed on 4/24/2024 but is stable compared with 5/6/2024 and decreased compared with 2/21/2024 possibly postsurgical changes. Residual underlying tumor not excluded. This measures 4.3 cm x 1.7 cm. Small right pleural effusion. Calcified granulomata. No pneumothorax. Calcified mediastinal and hilar lymph nodes consistent with prior granulomatous disease. 3.1 cm x 1.8  cm subcarinal lymph node. Severe atheromatous disease of the coronary vessels. Liver:No masses. No intrahepatic biliary ductal dilatation. Spleen:No masses. No perisplenic hematoma. Pancreas:No pancreatic masses. No evidence of pancreatitis. Gallbladder and common bile duct:No evidence of cholelithiasis. No evidence of cholecystitis. Adrenal glands:No adrenal masses Kidneys and ureters:No kidney stones. No renal masses.No calculi present within the ureters. Normal caliber ureters. Urinary bladder:No urinary bladder wall thickening. No bladder masses. Small bowel:Normal caliber small bowel. Large bowel: Diffuse large bowel wall thickening with surrounding infiltration of the fat consistent with colitis. Appendix: Normal GENITOURINARY: Normal prostate Ascites or pneumoperitoneum:None. Adenopathy: No adenopathy within the pelvis. Osseous structures: Degenerative changes of the hips and lumbar spine. No lytic or blastic disease is definitively identified. Other findings: None     Colitis. No abscess formation or perforation. Small right pleural effusion. Stable appearance of the right lower lobe density. Electronically Signed: Kam Foy MD  5/19/2024 11:43 AM EDT  Workstation ID: [id number]    XR Chest 1 View    Result Date: 5/19/2024  XR CHEST 1 VW Date of Exam: 5/19/2024 9:37 AM EDT Indication: Weak/Dizzy/AMS triage protocol Comparison: Chest radiograph 5/14/2024. Findings: Unchanged appearance of the right chest pacemaker and left chest port. Cardiomediastinal silhouette is unchanged. Pulmonary vascular congestion with mild diffuse interstitial thickening. Background of chronic/emphysematous changes. No dominant, focal consolidation. Small/moderate right pleural effusion. No sizable left pleural effusion. No pneumothorax. Osseous structures are unchanged.     Impression: Findings suggestive of mild pulmonary edema with small/moderate right pleural effusion. No dominant, focal consolidation. Finding superimposed upon  a background of chronic/emphysematous changes. Electronically Signed: Marco Leahy MD  5/19/2024 9:58 AM EDT  Workstation ID: [id number]    XR Chest 1 View    Result Date: 5/14/2024  Examination: XR CHEST 1 VW-  Date of Exam: 5/14/2024 3:48 AM  Indication: cough.  Comparison: 5/11/2024.  Technique: 1 view of the chest  Findings: No significant consolidation. Small right-sided pleural effusion present, stable to improved as compared to the previous study. Stable right subclavian AICD/pacemaker device. Stable left-sided Mediport. No pneumothorax. The heart size is normal. The pulmonary vasculature appears within normal limits. No acute osseous abnormality identified.      Small right-sided pleural effusion, stable to improved as compared to the previous study. No significant airspace disease.  This report was finalized on 5/14/2024 4:10 AM by Desiree Matthews MD.      XR Chest 1 View    Result Date: 5/11/2024  XR CHEST 1 VW Date of Exam: 5/11/2024 12:11 AM EDT Indication: dyspnea Comparison: 5/6/2024. Findings: There are no airspace consolidations. Stable scarring present within the right lung base with stable small right-sided pleural effusion. Stable right subclavian AICD/pacemaker device. Mild linear atelectatic changes are present within the left lung base which appear new.. No pneumothorax. The pulmonary vasculature appears within normal limits. The cardiac and mediastinal silhouette appear unremarkable. No acute osseous abnormality identified.     Impression: New mild linear left basilar atelectasis. Otherwise, stable with redemonstration of small right-sided pleural effusion.. Electronically Signed: Desiree Matthews MD  5/11/2024 12:25 AM EDT  Workstation ID: [id number]    Adult Transesophageal Echo 3D (DAMIÁN) W/ Cont If Necessary Per Protocol    Result Date: 5/9/2024    Left ventricular systolic function is normal. Left ventricular ejection fraction appears to be 61 - 65%. Normal left ventricular cavity size noted. Left  ventricular wall thickness is consistent with mild concentric hypertrophy. All left ventricular wall segments contract normally.   There is mild calcification of the aortic valve. The aortic valve appears trileaflet. Mild aortic valve regurgitation is present. No aortic valve stenosis is present. There is no evidence of an aortic valve mass is present.   There is mild calcification of the mitral valve. There is mild, bileaflet mitral valve thickening present. No evidence of a mitral valve mass is present. Mild to moderate mitral valve regurgitation is present. No significant mitral valve stenosis is present.   The tricuspid valve is structurally normal with no significant stenosis present. There is no evidence of a mass on the tricuspid valve. Trace tricuspid valve regurgitation is present.   The pulmonic valve is grossly normal in structure. There is trace pulmonic valve regurgitation present.   No vegetation seen on atrial aspect of pacemaker leads.  The most distal aspects of the RV lead were not completely visualized however there was no apparent vegetation in the more proximal segment, adjacent to the tricuspid valve which appears to be functioning normally.     CT Abdomen Pelvis Without Contrast    Result Date: 5/6/2024  CT ABDOMEN PELVIS WO CONTRAST Date of Exam: 5/6/2024 12:29 AM EDT Indication: diarrhea, vomiting, hypotension. Comparison: None available. Technique: Axial CT images were obtained of the abdomen and pelvis without the administration of contrast. Reconstructed coronal and sagittal images were also obtained. Automated exposure control and iterative construction methods were used. Findings: Findings in the chest discussed in a separate report. No acute findings in the superficial soft tissues. No acute osseous abnormalities or destructive bone lesions. There is mild thoracolumbar spondylosis. There are minor osteophytes of both hips. The liver, gallbladder, bile ducts, pancreas, mid and distal  stomach, duodenum and spleen appear unremarkable. Adrenal glands appear normal. There is marked bilateral perinephric stranding and fluid without organization. This appears confined to Gerota's  fascia. Kidney parenchyma appears homogeneous. No hydronephrosis. No urolithiasis. Mild urinary bladder wall thickening appears unchanged. No pericystic inflammation. There is mild colonic diverticulosis and mild colonic fecal retention. No small bowel distention. There is moderate aortoiliac atherosclerosis without evidence of aneurysm. No ascites, pneumoperitoneum or lymphadenopathy.     Impression: Marked bilateral perinephric stranding and fluid without hydronephrosis. Appearance is nonspecific, but could be related to pyelonephritis. Correlate with urinalysis. Electronically Signed: Raulito Crenshaw MD  5/6/2024 12:54 AM EDT  Workstation ID: [id number]    CT Chest Without Contrast Diagnostic    Result Date: 5/6/2024  CT CHEST WO CONTRAST DIAGNOSTIC Date of Exam: 5/6/2024 12:29 AM EDT Indication: hypotension, chills, vomiting. Comparison: Chest radiograph 5/5/2024 and chest CT 4/24/2024. Technique: Axial CT images were obtained of the chest without contrast administration.  Reconstructed coronal and sagittal images were also obtained. Automated exposure control and iterative construction methods were used. Findings: There is a stable small loculated right-sided pleural effusion. There is stable scarring in the lung apices along with paraseptal emphysema. Stable subpleural interstitial disease in both lungs, right greater than left favored to be chronic. There is a small calcified cluster of granulomas in the left upper lobe. There appear to be changes of right lower lobectomy. There is no pneumothorax. No left-sided pleural effusion. No new or enlarging lung nodules. The thyroid, trachea and esophagus appear within normal limits. There is a small hiatal hernia. Heart size is normal. There are severe coronary artery  calcifications. ICD leads noted in the right heart. Mild aortic atherosclerosis. No aneurysm. No mediastinal lymphadenopathy or pericardial effusion. There is a stable left-sided chest port. Stable right-sided ICD. No acute findings in the superficial soft tissues. There is new marked bilateral perinephric stranding and trace unorganized fluid. The kidneys are only partially included on this study. No  additional findings in the limited images of the upper abdomen. No acute osseous abnormality or destructive bone lesion. There are moderate lower cervical and mild diffuse thoracic degenerative changes. There are healing minimally displaced fractures of  the right lateral fifth and sixth ribs, which appears not significantly changed from the prior study. No new rib fractures.     Impression: 1. Stable small loculated right-sided pleural effusion with stable postoperative and chronic changes in both lungs. 2. Severe coronary artery disease. 3. Grossly abnormal appearance of partially visualized kidneys in the upper retroperitoneum. Appearance could be due to infection, inflammation or obstruction. Recommend dedicated imaging of the abdomen and pelvis ideally with IV contrast. 4. Small hiatal hernia. 5. Stable appearance of healing right lateral fifth and sixth rib fractures. Electronically Signed: Raulito Crenshaw MD  5/6/2024 12:49 AM EDT  Workstation ID: [id number]    XR Chest 1 View    Result Date: 5/6/2024  XR CHEST 1 VW Date of Exam: 5/5/2024 11:47 PM EDT Indication: hypotension Comparison: 4/24/2024 and chest CT same date. Findings: Stable volume loss in the right lung. Stable small right-sided pleural effusion and right basilar scarring. Left lung remains clear. Stable diffuse interstitial prominence in the lungs. No pneumothorax or new lung opacity. Stable right-sided multi lead ICD. Heart size is unchanged. Pulmonary vasculature is within normal limits. No acute osseous abnormalities. Stable left-sided chest port.      Impression: Stable chronic and postoperative changes and small right-sided pleural effusion. Electronically Signed: Raulito Crenshaw MD  5/6/2024 12:21 AM EDT  Workstation ID: [id number]    Adult Transthoracic Echo Complete W/ Cont if Necessary Per Protocol    Result Date: 4/27/2024    Left ventricular ejection fraction appears to be 61 - 65%.   Left ventricular wall thickness is consistent with mild concentric hypertroph   There is calcification and thickening of the aortic valve.  No independently mobile vegetations visualized.   Mild aortic valve regurgitation is present   Mild dilation of the aortic root is present.  Measures 4.0 cm.   There are pacemaker leads noted in the right atrium/right ventricle.   Mild tricuspid valve regurgitation is present. Estimated right ventricular systolic pressure from tricuspid regurgitation is normal (<35 mmHg).   Mild mitral valve regurgitation is present     CT Abdomen Pelvis Without Contrast    Result Date: 4/24/2024  CT ABDOMEN PELVIS WO CONTRAST Date of Exam: 4/24/2024 12:13 PM EDT Indication: Sepsis, source unclear. Comparison: 4/12/2024 Technique: Axial CT images were obtained of the abdomen and pelvis without the administration of contrast. Reconstructed coronal and sagittal images were also obtained. Automated exposure control and iterative construction methods were used. Findings: Included lung bases are unchanged in appearance from prior CT. No new abnormality. No suspicious hepatic lesion. Gallbladder is unremarkable. No significant biliary ductal dilation. The spleen, pancreas, and bilateral adrenal glands are unchanged. There is significantly decreased perinephric fat stranding about the bilateral kidneys. There is no evidence of hydronephrosis or nephrolithiasis. No suspicious renal lesion. Small hiatal hernia. No bowel obstruction. Normal appendix. A few scattered colonic diverticuli without evidence of diverticulitis. No suspicious lymphadenopathy. Dense  atherosclerotic calcifications of the aorta and branch vessels. Decreased urinary bladder wall thickening which may be in part secondary to increased distention. Prostatomegaly. Abdominal and pelvic wall soft tissues show no acute abnormality. There is no acute fracture or suspicious osseous lesion.     Impression: No new discrete abnormality of the abdomen or pelvis as compared to prior CT. Significantly decreased perinephric fat stranding about the bilateral kidneys. Decreased urinary bladder wall thickening which may be in part secondary to increased distention compared to prior exam. Electronically Signed: Raulito Light MD  4/24/2024 12:47 PM EDT  Workstation ID: [id number]    CT Chest Without Contrast Diagnostic    Result Date: 4/24/2024  CT CHEST WO CONTRAST DIAGNOSTIC Date of Exam: 4/24/2024 12:13 PM EDT Indication: Cough, sepsis, unclear source. Comparison: CT chest 4/12/2024 Technique: Axial CT images were obtained of the chest without contrast administration.  Reconstructed coronal and sagittal images were also obtained. Automated exposure control and iterative construction methods were used. Findings: MEDIASTINUM: The heart size is stable. Trace pericardial fluid. Right chest wall AICD. Left chest wall infusion port with tip in the SVC. CORONARY ARTERIES: There is calcified atherosclerotic disease. LUNGS: There are postsurgical changes of the right lower lung with inferomedial scarring posteriorly. There is a loculated right pleural effusion tracking into the fissure, similar to the previous study. There is diffuse emphysema with biapical scarring. LYMPH NODES: Mildly prominent mediastinal lymph nodes are unchanged. There are small calcified subcarinal and left hilar nodes. OSSEOUS STRUCTURES: Multilevel thoracic degenerative disc disease. Stable healing right-sided rib fractures. No acute osseous abnormality.     Impression: 1. Stable postsurgical changes of the right lower lung with scarring and chronic  appearing loculated right pleural effusion. 2. Mild emphysema and biapical scarring. Electronically Signed: Aimee Freitas MD  4/24/2024 12:37 PM EDT  Workstation ID: [id number]    XR Chest 1 View    Result Date: 4/24/2024  XR CHEST 1 VW Date of Exam: 4/24/2024 10:15 AM EDT Indication: Sepsis, cough Comparison: 4/12/2024 Findings: Cardiomediastinal silhouette is stable. There are postsurgical changes of the right lung with a small chronic effusion. Left lung is clear. Left-sided port catheter with the tip in the SVC. Right AICD. No acute osseous abnormality identified.     Impression: Stable chronic appearance of the chest. No acute cardiopulmonary abnormality. Electronically Signed: Aimee Freitas MD  4/24/2024 10:37 AM EDT  Workstation ID: [id number]      ASSESSMENT/PLAN:  Non-small cell lung cancer  History of immunotherapy  ARACELI  Recurrent hypotension  Recurrent leukocytosis  C diff colitis  -Last Opdivo 4/4/2024  -Hold further therapy  -ID notes, nephrology notes and interim labs reviewed.  -Panhypopituitarism workup from last admission was negative. Ino stim test planned this admission to rule out adrenal insufficiency.   -Immunotherapy induced nephritis being considered per nephrology notes. Treatment for immunotherapy related adverse events (other than endocrinopathies)  is generally high dose steroids but the risk of this would would need to be weighed against risk of worsening his acute infection.      -Indeterminate subcarinal LN noted.   -I would recommend we continue to follow his lung cancer with serial outpt PET after he recovers from this acute issues and will tentatively plan in 4 weeks.   Neetu Ashley MD    5/22/2024

## 2024-05-22 NOTE — PROGRESS NOTES
"   LOS: 1 day    Patient Care Team:  Hayley Castellanos APRN as PCP - General (Nurse Practitioner)  Octaviano Sampson MD as Consulting Physician (Pulmonary Disease)  Neetu Ashley MD as Referring Physician (Hematology and Oncology)  Nimo Rodríguez MD as Consulting Physician (Radiation Oncology)    Chief Complaint: Hypotension and weakness    Subjective     Interval History:     No acute events overnight. No new complaints   Weak and lethargic       Review of Systems:   No CP or SOA , fever, chills, rigors, rash, N/V, Constipation.       Objective     Vital Sign Min/Max for last 24 hours  Temp  Min: 97.2 °F (36.2 °C)  Max: 97.8 °F (36.6 °C)   BP  Min: 95/63  Max: 127/66   Pulse  Min: 70  Max: 73   Resp  Min: 15  Max: 18   SpO2  Min: 95 %  Max: 97 %   No data recorded   No data recorded     Flowsheet Rows      Flowsheet Row First Filed Value   Admission Height 182.9 cm (72\") Documented at 05/20/2024 1845   Admission Weight 76.8 kg (169 lb 4.8 oz) Documented at 05/20/2024 1621            I/O this shift:  In: 480 [P.O.:480]  Out: 400 [Urine:400]  I/O last 3 completed shifts:  In: 520 [P.O.:520]  Out: 2350 [Urine:2350]    Physical Exam:    Gen: Alert, NAD   HENT: NC, AT, EOMI   NECK: Supple, no JVD, Trachea midline   LUNGS: CTA bilaterally, non labored respirtation   CVS: S1/S2 audible, RRR, no murmur   Abd: Soft, NT, ND, BS+   Ext: No pedal edema, no cyanosis   CNS: Alert, No focal deficit noted grossly  Psy: Cooperative  Skin: Warm, dry and intact      WBC WBC   Date Value Ref Range Status   05/22/2024 15.36 (H) 3.40 - 10.80 10*3/mm3 Final   05/21/2024 17.18 (H) 3.40 - 10.80 10*3/mm3 Final   05/20/2024 22.57 (H) 3.40 - 10.80 10*3/mm3 Final      HGB Hemoglobin   Date Value Ref Range Status   05/22/2024 9.2 (L) 13.0 - 17.7 g/dL Final   05/21/2024 8.9 (L) 13.0 - 17.7 g/dL Final   05/20/2024 9.5 (L) 13.0 - 17.7 g/dL Final      HCT Hematocrit   Date Value Ref Range Status   05/22/2024 28.1 (L) 37.5 - 51.0 % Final " "  05/21/2024 28.0 (L) 37.5 - 51.0 % Final   05/20/2024 29.8 (L) 37.5 - 51.0 % Final      Platlets No results found for: \"LABPLAT\"   MCV MCV   Date Value Ref Range Status   05/22/2024 93.4 79.0 - 97.0 fL Final   05/21/2024 92.7 79.0 - 97.0 fL Final   05/20/2024 91.7 79.0 - 97.0 fL Final          Sodium Sodium   Date Value Ref Range Status   05/22/2024 133 (L) 136 - 145 mmol/L Final   05/21/2024 134 (L) 136 - 145 mmol/L Final   05/20/2024 133 (L) 136 - 145 mmol/L Final      Potassium Potassium   Date Value Ref Range Status   05/22/2024 4.2 3.5 - 5.2 mmol/L Final     Comment:     Slight hemolysis detected by analyzer. Result may be falsely elevated.   05/21/2024 4.3 3.5 - 5.2 mmol/L Final   05/20/2024 4.4 3.5 - 5.2 mmol/L Final      Chloride Chloride   Date Value Ref Range Status   05/22/2024 102 98 - 107 mmol/L Final   05/21/2024 103 98 - 107 mmol/L Final   05/20/2024 101 98 - 107 mmol/L Final      CO2 CO2   Date Value Ref Range Status   05/22/2024 16.0 (L) 22.0 - 29.0 mmol/L Final   05/21/2024 17.0 (L) 22.0 - 29.0 mmol/L Final   05/20/2024 17.0 (L) 22.0 - 29.0 mmol/L Final      BUN BUN   Date Value Ref Range Status   05/22/2024 33 (H) 8 - 23 mg/dL Final   05/21/2024 37 (H) 8 - 23 mg/dL Final   05/20/2024 36 (H) 8 - 23 mg/dL Final      Creatinine Creatinine   Date Value Ref Range Status   05/22/2024 3.86 (H) 0.76 - 1.27 mg/dL Final   05/21/2024 4.49 (H) 0.76 - 1.27 mg/dL Final   05/20/2024 4.57 (H) 0.76 - 1.27 mg/dL Final      Calcium Calcium   Date Value Ref Range Status   05/22/2024 8.3 (L) 8.6 - 10.5 mg/dL Final   05/21/2024 8.4 (L) 8.6 - 10.5 mg/dL Final   05/20/2024 8.9 8.6 - 10.5 mg/dL Final      PO4 No results found for: \"CAPO4\"   Albumin Albumin   Date Value Ref Range Status   05/22/2024 3.0 (L) 3.5 - 5.2 g/dL Final   05/20/2024 3.4 (L) 3.5 - 5.2 g/dL Final      Magnesium No results found for: \"MG\"   Uric Acid No results found for: \"URICACID\"        Results Review:     I reviewed the patient's new clinical " results.    budesonide-formoterol, 2 puff, Inhalation, BID - RT   And  tiotropium bromide monohydrate, 2 puff, Inhalation, Daily - RT  ferrous sulfate, 325 mg, Oral, Daily With Breakfast  heparin (porcine), 5,000 Units, Subcutaneous, Q8H  megestrol, 20 mg, Oral, Daily  pantoprazole, 40 mg, Oral, Q AM  pravastatin, 80 mg, Oral, Nightly  sodium bicarbonate, 650 mg, Oral, TID  sodium chloride, 10 mL, Intravenous, Q12H           Medication Review: yes    Results from last 7 days   Lab Units 05/22/24  0310 05/21/24  0448 05/20/24  1820 05/19/24  0940   SODIUM mmol/L 133* 134* 133* 136   POTASSIUM mmol/L 4.2 4.3 4.4 4.2   CHLORIDE mmol/L 102 103 101 104   CO2 mmol/L 16.0* 17.0* 17.0* 19.0*   BUN mg/dL 33* 37* 36* 34*   CREATININE mg/dL 3.86* 4.49* 4.57* 4.73*   CALCIUM mg/dL 8.3* 8.4* 8.9 9.2   ALBUMIN g/dL 3.0*  --  3.4* 3.6   WBC 10*3/mm3 15.36* 17.18* 22.57* 21.85*   HEMOGLOBIN g/dL 9.2* 8.9* 9.5* 10.0*   PLATELETS 10*3/mm3 290 288 324 326           Assessment & Plan       Weakness    Malignant neoplasm of lower lobe of right lung    Severe malnutrition    Leukocytosis    CAD (coronary artery disease)    Hypotension    Stage 4 chronic kidney disease    1.  ARACELI on CKD stage IV - Scr 4.49- stable. creatinine peaked around 7.6 to last admission, patient went home with a creatinine slowly coming down to 5 range.    2.  Proteinuria   3.  S/p sepsis resolved patient on last admission was taken off the pressors and was placed on midodrine.  4.  Hyponatremia    5. Anemia   6.  Metabolic acidosis.   7.  Infectious disease: Patient's followed with ID consultants.  8.  S/p immunotherapy for non-small cell cancer last infusion 4/4/2024 Opdivo.  9- Hypotension - Cortisol level - 5.4 2 weeks back was 26.33 - Adrenal insufficiency reported with Opdivo in literature along with other endocrine abnormalities. Per wife, he has salt craving and weakness even before Opdivo. Hypotensive patient with cortisol level below 18 suggestive of  adrenal insufficiency - To confirm diagnosis ACTH stimulation test needed.      Recommendation:  -Will do ACTH stimulation test tomorrow.  - Encourage oral hydration.  - Continue with midodrine as needed   - Sodium bicarb tablets- will increase dose   - May need to start hydrocortisone   - Monitor I/O   - Avoid nephrotoxic agents.   - Renal diet   - Adjust meds per renal function   - No emergent need of RRT   - Monitor H/H and transfuse for Hgb less than 7.0      High risk complex patient multiple medical problems.  I discussed the patients findings and my recommendations with patient and nursing staff      Kurt Ríos MD  05/22/24  09:53 EDT

## 2024-05-23 ENCOUNTER — READMISSION MANAGEMENT (OUTPATIENT)
Dept: CALL CENTER | Facility: HOSPITAL | Age: 74
End: 2024-05-23
Payer: MEDICARE

## 2024-05-23 VITALS
BODY MASS INDEX: 22.94 KG/M2 | OXYGEN SATURATION: 98 % | WEIGHT: 169.4 LBS | HEIGHT: 72 IN | RESPIRATION RATE: 18 BRPM | SYSTOLIC BLOOD PRESSURE: 112 MMHG | TEMPERATURE: 97.8 F | HEART RATE: 70 BPM | DIASTOLIC BLOOD PRESSURE: 69 MMHG

## 2024-05-23 LAB
ADENO QPCR: NOT DETECTED COPIES/ML
BASOPHILS # BLD AUTO: 0.04 10*3/MM3 (ref 0–0.2)
BASOPHILS NFR BLD AUTO: 0.3 % (ref 0–1.5)
CMV DNA SERPL NAA+PROBE-ACNC: NEGATIVE IU/ML
CMV DNA SERPL NAA+PROBE-LOG IU: NORMAL LOG10 IU/ML
CORTIS SERPL-MCNC: 15.44 MCG/DL
CORTIS SERPL-MCNC: 20.74 MCG/DL
CORTIS SERPL-MCNC: 23.39 MCG/DL
DEPRECATED RDW RBC AUTO: 54.2 FL (ref 37–54)
EOSINOPHIL # BLD AUTO: 0.05 10*3/MM3 (ref 0–0.4)
EOSINOPHIL NFR BLD AUTO: 0.3 % (ref 0.3–6.2)
ERYTHROCYTE [DISTWIDTH] IN BLOOD BY AUTOMATED COUNT: 16.8 % (ref 12.3–15.4)
H CAPSUL AG UR QL IA: NEGATIVE
HCT VFR BLD AUTO: 27.6 % (ref 37.5–51)
HGB BLD-MCNC: 9 G/DL (ref 13–17.7)
IMM GRANULOCYTES # BLD AUTO: 0.31 10*3/MM3 (ref 0–0.05)
IMM GRANULOCYTES NFR BLD AUTO: 2 % (ref 0–0.5)
LYMPHOCYTES # BLD AUTO: 1.35 10*3/MM3 (ref 0.7–3.1)
LYMPHOCYTES NFR BLD AUTO: 8.6 % (ref 19.6–45.3)
MCH RBC QN AUTO: 29.2 PG (ref 26.6–33)
MCHC RBC AUTO-ENTMCNC: 32.6 G/DL (ref 31.5–35.7)
MCV RBC AUTO: 89.6 FL (ref 79–97)
MONOCYTES # BLD AUTO: 1.89 10*3/MM3 (ref 0.1–0.9)
MONOCYTES NFR BLD AUTO: 12 % (ref 5–12)
NEUTROPHILS NFR BLD AUTO: 12.05 10*3/MM3 (ref 1.7–7)
NEUTROPHILS NFR BLD AUTO: 76.8 % (ref 42.7–76)
NRBC BLD AUTO-RTO: 0 /100 WBC (ref 0–0.2)
PLATELET # BLD AUTO: 269 10*3/MM3 (ref 140–450)
PMV BLD AUTO: 8.9 FL (ref 6–12)
RBC # BLD AUTO: 3.08 10*6/MM3 (ref 4.14–5.8)
WBC NRBC COR # BLD AUTO: 15.69 10*3/MM3 (ref 3.4–10.8)

## 2024-05-23 PROCEDURE — 82024 ASSAY OF ACTH: CPT | Performed by: INTERNAL MEDICINE

## 2024-05-23 PROCEDURE — 25010000002 HEPARIN (PORCINE) PER 1000 UNITS: Performed by: INTERNAL MEDICINE

## 2024-05-23 PROCEDURE — 97110 THERAPEUTIC EXERCISES: CPT

## 2024-05-23 PROCEDURE — 82533 TOTAL CORTISOL: CPT | Performed by: INTERNAL MEDICINE

## 2024-05-23 PROCEDURE — 84244 ASSAY OF RENIN: CPT | Performed by: INTERNAL MEDICINE

## 2024-05-23 PROCEDURE — 85025 COMPLETE CBC W/AUTO DIFF WBC: CPT | Performed by: INTERNAL MEDICINE

## 2024-05-23 PROCEDURE — 94664 DEMO&/EVAL PT USE INHALER: CPT

## 2024-05-23 PROCEDURE — 99239 HOSP IP/OBS DSCHRG MGMT >30: CPT | Performed by: INTERNAL MEDICINE

## 2024-05-23 PROCEDURE — 97116 GAIT TRAINING THERAPY: CPT

## 2024-05-23 PROCEDURE — 25010000002 COSYNTROPIN PER 0.25 MG: Performed by: INTERNAL MEDICINE

## 2024-05-23 PROCEDURE — 82088 ASSAY OF ALDOSTERONE: CPT | Performed by: INTERNAL MEDICINE

## 2024-05-23 PROCEDURE — 94799 UNLISTED PULMONARY SVC/PX: CPT

## 2024-05-23 PROCEDURE — 82627 DEHYDROEPIANDROSTERONE: CPT | Performed by: INTERNAL MEDICINE

## 2024-05-23 RX ORDER — VANCOMYCIN HYDROCHLORIDE 125 MG/1
125 CAPSULE ORAL EVERY 6 HOURS SCHEDULED
Qty: 37 CAPSULE | Refills: 0 | Status: SHIPPED | OUTPATIENT
Start: 2024-05-23 | End: 2024-05-31 | Stop reason: HOSPADM

## 2024-05-23 RX ORDER — MIDODRINE HYDROCHLORIDE 10 MG/1
10 TABLET ORAL 3 TIMES DAILY PRN
Qty: 30 TABLET | Refills: 0 | Status: SHIPPED | OUTPATIENT
Start: 2024-05-23

## 2024-05-23 RX ORDER — SODIUM BICARBONATE 650 MG/1
1300 TABLET ORAL 3 TIMES DAILY
Qty: 180 TABLET | Refills: 0 | Status: SHIPPED | OUTPATIENT
Start: 2024-05-23 | End: 2024-06-22

## 2024-05-23 RX ADMIN — Medication 10 ML: at 08:49

## 2024-05-23 RX ADMIN — TIOTROPIUM BROMIDE INHALATION SPRAY 2 PUFF: 3.12 SPRAY, METERED RESPIRATORY (INHALATION) at 09:16

## 2024-05-23 RX ADMIN — FERROUS SULFATE TAB 325 MG (65 MG ELEMENTAL FE) 325 MG: 325 (65 FE) TAB at 08:48

## 2024-05-23 RX ADMIN — COSYNTROPIN 0.25 MG: 0.25 INJECTION, POWDER, LYOPHILIZED, FOR SOLUTION INTRAVENOUS at 08:49

## 2024-05-23 RX ADMIN — SODIUM BICARBONATE 650 MG TABLET 1300 MG: at 08:48

## 2024-05-23 RX ADMIN — BUDESONIDE AND FORMOTEROL FUMARATE DIHYDRATE 2 PUFF: 160; 4.5 AEROSOL RESPIRATORY (INHALATION) at 09:14

## 2024-05-23 RX ADMIN — VANCOMYCIN HYDROCHLORIDE 125 MG: 125 CAPSULE ORAL at 12:11

## 2024-05-23 RX ADMIN — HEPARIN SODIUM 5000 UNITS: 5000 INJECTION INTRAVENOUS; SUBCUTANEOUS at 15:05

## 2024-05-23 RX ADMIN — VANCOMYCIN HYDROCHLORIDE 125 MG: 125 CAPSULE ORAL at 06:21

## 2024-05-23 RX ADMIN — MEGESTROL ACETATE 20 MG: 20 TABLET ORAL at 08:49

## 2024-05-23 RX ADMIN — HEPARIN SODIUM 5000 UNITS: 5000 INJECTION INTRAVENOUS; SUBCUTANEOUS at 06:20

## 2024-05-23 RX ADMIN — Medication 1 CAPSULE: at 08:48

## 2024-05-23 RX ADMIN — FAMOTIDINE 20 MG: 20 TABLET, FILM COATED ORAL at 08:48

## 2024-05-23 NOTE — PLAN OF CARE
Goal Outcome Evaluation:  Plan of Care Reviewed With: patient        Progress: no change  Outcome Evaluation: Patient ambulated 10' cot>bed with CGA for safety, no AD needed, unable to progress mobility due to drop in blood pressure 59/49 mmHg. Completed B LE exercises at EOB. Recommend home with assist at D/C.

## 2024-05-23 NOTE — PROGRESS NOTES
INFECTIOUS DISEASE Progress note    David Barfield  1950  [id number]    Date of consult: 5/21/24    Admit date: 5/20/2024    Requesting Provider: Derian Barry MD  Evaluating physician: Derian Barry MD  Reason for Consultation: known to patient. Sent from clinic  Chief Complaint: fatigue, poor appetite.      Subjective   History of present illness:  David Barfield is a  74 y.o.  Yr old male with PMH NSCLC/RLL lobectomy (1/2024) recently on Opdivo (last dose on 4/4/24), HTN, and emphysema who I have followed during multiple recent admissions after he presented with vague abdominal pain, nausea, vomiting, diarrhea, severe leukocytosis with neutrophilia, and recent renal dysfunction. The patient was recently on cefepime 2 g IV every 24 hours empirically for sepsis of unclear etiology.  He remained on cefepime for about 2 weeks after his most recent hospitalization. There has been concern for bilateral pyelonephritis based on imaging and some of his symptoms.  He did initially seem to respond to cefepime after his first admission with a downtrend of his white blood cell count and improvement in his symptoms.  Renal function also improved from a creatinine greater than 8 down to a creatinine just over 2 during his initial admission.  He has recently been admitted to additional times with subsequent improvement during his hospitalizations and then he declines after discharge.  Multiple sets of blood cultures and urine cultures have been no growth.  He has had some pyuria on his urinalyses.  He is also intermittently had diarrhea but C. Difficile testing and GI PCR panels have been negative. I additionally sent a karius test from his blood during 1 of his previous admissions and this was negative although antibiotic modified.  Plan after his last hospitalization was to treat him empirically with a course of cefepime for up to 4 weeks to see if he responded.  There is also been some concern that prior  immunotherapy without Depo contribute into his presentation.  He did present back to the ER a couple nights ago and was offered admission but declined and left AMA from the ER.  His white blood cell count was again elevated to 21.85 and his creatinine was 4.73 during his ER visit.  Pro-calcitonin was also slightly elevated to 1.03.  Repeat urine cultures and blood cultures remain no growth to date.  Repeat CT abdomen and pelvis showed signs of colitis with no abscess.  I reevaluate the patient in clinic yesterday and he was not feeling well and his blood pressure was noted to be in the 80s over 50s.  He stated that he was not eating well at home and he remained nauseous with some recent recurrence of his diarrhea.  He is not having any fevers or chills.  His urine output and also reportedly decreased but he was still urinating.  I decided to directly admit the patient to the hospital for further evaluation given his worsening symptoms.  Antibiotics have currently been held as I want to see how he does off of antibiotics as it is unclear if this is actually an infectious process.  I did find a case report of anti-tubular basement membrane antibody disease associated with Opdivo infusion and concomitant acute pyelonephritis leading to acute kidney injury.     Since arrival, the patient is feeling slightly better.  Still having some diarrhea although somewhat better today.  Still having some intermittent nausea and poor appetite.  He remains afebrile.  Blood pressure has improved since arrival and his Midodrine dosing has been adjusted by nephrology. White blood cell count has trended down to 17.18 off of antibiotics.  Creatinine is 4.49, down from 4.57 yesterday.  GI PCR panel was negative.  He does have a BK virus PCR from his urine, urine histo antigen, CMV PCR from his blood, adenovirus PCR from his blood, Fungitell BDG, EBV antibody profile pending. Recent ANCA panel was negative. Blood cultures are in progress.  Urine culture from 5/19 was no growth final.    Of note, the patient does admit to bird watching and some close contact with birds.  He had not previously admitted to this.  He does not endorse any other zoonotic exposures.     Subjective:    5/22/24: The patient does feel slightly better.  Appetite is improved somewhat and nausea is better.  Diarrhea has resolved today.  No fevers.  No abdominal pain or flank pain. Hypotension has improved and he only had 1 blood pressure recently that was lower than 100 systolic. C. Difficile test was pending at the time of my exam but has now resulted positive.  White blood cell count trended down to 15.36 today.  Creatinine improved to 3.86.    5/23/24: Patient is feeling okay today.  Blood pressure is improved.  He has no new complaints.  He denies any abdominal pain or flank pain.  No nausea or vomiting.  Diarrhea has resolved.  White blood cell count is stable at 15.69.  No fevers.    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    Coronary artery disease 2019    Diabetes mellitus Borderline    Emphysema/COPD     Erectile disorder     GERD (gastroesophageal reflux disease)     History of chemotherapy     Hyperlipidemia     Hypertension 2019    Lung nodule     Mumps     Mumps     Pruritus     after bath    Slow to wake up after anesthesia     Stage 5 chronic kidney disease not on chronic dialysis 5/14/2024    Wears dentures     upper only    Wears hearing aid in both ears     usually only wears right       Past Surgical History:   Procedure Laterality Date    BONE BIOPSY      broken bone surgery in his face    BRONCHOSCOPY THORACOTOMY Right 01/09/2024    Procedure: THORACOTOMY FOR LOWER LOBECTOMY AND MEDISTINAL LYMPH NODE DISSECTION RIGHT;  Surgeon: Joey Patel MD;  [facility];  Service: Cardiothoracic;  Laterality: Right;    BRONCHOSCOPY WITH ION  "ROBOTIC ASSIST N/A 09/15/2023    Procedure: BRONCHOSCOPY NAVIGATION WITH ENDOBRONCHIAL ULTRASOUND AND ION ROBOT;  Surgeon: Octaviano Sampson MD;  Location: [facility] ENDOSCOPY;  Service: Robotics - Pulmonary;  Laterality: N/A;  ion #[id number] - [id number]  - 0015  Cath guide 0061    EBUS balloon removed and intact    CARDIAC ELECTROPHYSIOLOGY PROCEDURE N/A 08/17/2021    Procedure: Pacemaker DC new;  Surgeon: Kayy Box MD;  Location:  [facility];  Service: Cardiology;  Laterality: N/A;    FACIAL FRACTURE SURGERY      LYMPH NODE BIOPSY  2023    PACEMAKER IMPLANTATION         Pediatric History   Patient Parents    Not on file     Other Topics Concern    Not on file   Social History Narrative    Lives in [place], [place]       family history includes Aneurysm in his mother; Cancer in his sister; Dementia in his father; Heart disease in his paternal grandmother; Hypertension in his paternal grandfather; Leukemia in his sister.    Allergies   Allergen Reactions    Cymbalta [Duloxetine Hcl] GI Intolerance    Gabapentin Mental Status Change     Pt states that this medication \"makes him feel foolish in his head\".     Remeron [Mirtazapine] Other (See Comments)     Excess sedation    Toradol [Ketorolac Tromethamine] GI Intolerance     Projectile vomiting     Latex Other (See Comments)     Latex allergy     Tape Rash       Immunization History   Administered Date(s) Administered    ABRYSVO (RSV, 60+ or pregnant women 32-36 wks) 10/16/2023    COVID-19 (PFIZER) BIVALENT 12+YRS 09/16/2022    COVID-19 (PFIZER) Purple Cap Monovalent 01/29/2021, 02/19/2021, 10/18/2021, 04/14/2022    COVID-19 F23 (PFIZER) 12YRS+ (COMIRNATY) 09/26/2023    Fluzone High-Dose 65+yrs 10/06/2023    Pneumococcal Conjugate 20-Valent (PCV20) 10/11/2023       Medication:  No current facility-administered medications for this encounter.    Current Outpatient Medications:     midodrine (PROAMATINE) 10 MG tablet, Take 1 tablet by mouth 3 (Three) " Times a Day As Needed (Systolic BP less then 100)., Disp: 30 tablet, Rfl: 0    sodium bicarbonate 650 MG tablet, Take 2 tablets by mouth 3 (Three) Times a Day for 30 days., Disp: 180 tablet, Rfl: 0    vancomycin (VANCOCIN) 125 MG capsule, Take 1 capsule by mouth Every 6 (Six) Hours for 37 doses. Indications: Colon Inflammation due to Clostridium Bacteria Overgrowth, Disp: 37 capsule, Rfl: 0    albuterol sulfate  (90 Base) MCG/ACT inhaler, Inhale 2 puffs Every 4 (Four) Hours As Needed for Wheezing., Disp: 18 g, Rfl: 11    B Complex Vitamins (vitamin b complex) capsule capsule, Take 1 capsule by mouth Daily. OTC, Disp: , Rfl:     ferrous sulfate 325 (65 FE) MG tablet, Take 1 tablet by mouth Daily With Breakfast. OTC, Disp: , Rfl:     fluticasone (VERAMYST) 27.5 MCG/SPRAY nasal spray, 2 sprays into the nostril(s) as directed by provider 1 (One) Time As Needed for Rhinitis or Allergies., Disp: 6 mL, Rfl: 2    Fluticasone-Umeclidin-Vilant (Trelegy Ellipta) 100-62.5-25 MCG/ACT inhaler, Inhale 1 puff Daily., Disp: 3 each, Rfl: 3    HYDROcodone-acetaminophen (Norco) 7.5-325 MG per tablet, Take 1 tablet by mouth Every 6 (Six) Hours As Needed for Moderate Pain., Disp: 60 tablet, Rfl: 0    lidocaine-prilocaine (EMLA) 2.5-2.5 % cream, Apply 1 application  topically to the appropriate area as directed As Needed (45-60 minutes prior to port access.  Cover with saran/plastic wrap.)., Disp: 30 g, Rfl: 3    omeprazole (priLOSEC) 40 MG capsule, Take 1 capsule by mouth Daily., Disp: 30 capsule, Rfl: 5    ondansetron (ZOFRAN) 8 MG tablet, Take 1 tablet by mouth 3 (Three) Times a Day As Needed for Nausea or Vomiting., Disp: 30 tablet, Rfl: 2    pravastatin (PRAVACHOL) 80 MG tablet, Take 1 tablet by mouth Every Night., Disp: 30 tablet, Rfl: 11    Please refer to the medical record for a full medication list    Review of Systems:    As above    Physical Exam:   Vital Signs   Temp:  [97.4 °F (36.3 °C)-99 °F (37.2 °C)] 97.8 °F (36.6  "°C)  Heart Rate:  [70-77] 70  Resp:  [16-18] 18  BP: (107-117)/(62-72) 112/69    Temp  Min: 97.4 °F (36.3 °C)  Max: 99 °F (37.2 °C)  BP  Min: 107/65  Max: 117/62  Pulse  Min: 70  Max: 77  Resp  Min: 16  Max: 18  SpO2  Min: 98 %  Max: 99 %    Blood pressure 112/69, pulse 70, temperature 97.8 °F (36.6 °C), temperature source Oral, resp. rate 18, height 182.9 cm (72\"), weight 76.8 kg (169 lb 6.4 oz), SpO2 98%.  GENERAL: Awake and alert, in no acute distress. Sitting up in bed  HEENT:  Normocephalic, atraumatic. No external oral lesions noted  EYES: Anicteric.  No conjunctival injection  HEART: RRR, no murmur  LUNGS: CTA B.  Nonlabored breathing on room air  ABDOMEN: Soft, nontender, nondistended. No appreciable HSM.   GENITAL: no Yeung catheter  SKIN: no generalized rashes.  No peripheral stigmata of infective endocarditis noted.  PSYCHIATRIC: cooperative.  Appropriate mood and affect  EXT:  No cellulitic change.  NEURO: awake alert and oriented ×4.  Normal speech and cognition    AICD pocket site is without any erythema or tenderness    Left chest wall Mediport site is without any erythema or tenderness    Results Review:   I reviewed the patient's new clinical results.  I reviewed the patient's new imaging results and agree with the interpretation.  I reviewed the patient's other test results and agree with the interpretation    Results from last 7 days   Lab Units 05/23/24  0715 05/22/24  0310 05/21/24  0448   WBC 10*3/mm3 15.69* 15.36* 17.18*   HEMOGLOBIN g/dL 9.0* 9.2* 8.9*   HEMATOCRIT % 27.6* 28.1* 28.0*   PLATELETS 10*3/mm3 269 290 288     Results from last 7 days   Lab Units 05/22/24  0310   SODIUM mmol/L 133*   POTASSIUM mmol/L 4.2   CHLORIDE mmol/L 102   CO2 mmol/L 16.0*   BUN mg/dL 33*   CREATININE mg/dL 3.86*   GLUCOSE mg/dL 85   CALCIUM mg/dL 8.3*     Results from last 7 days   Lab Units 05/20/24  1820   ALK PHOS U/L 101   BILIRUBIN mg/dL 0.2   ALT (SGPT) U/L 9   AST (SGOT) U/L 13                 Results " "from last 7 days   Lab Units 05/19/24  0940   LACTATE mmol/L 1.3     Estimated Creatinine Clearance: 18 mL/min (A) (by C-G formula based on SCr of 3.86 mg/dL (H)).  CPK          5/6/2024    02:07   Common Labs   Creatine Kinase 12       Procalitonin Results:      Lab 05/19/24  0940   PROCALCITONIN 1.03*      Brief Urine Lab Results  (Last result in the past 365 days)        Color   Clarity   Blood   Leuk Est   Nitrite   Protein   CREAT   Urine HCG        05/19/24 1036 Yellow   Cloudy   Small (1+)   Large (3+)   Negative   100 mg/dL (2+)                  No results found for: \"SITE\", \"ALLENTEST\", \"PHART\", \"XAZ6PTQ\", \"PO2ART\", \"QOY5XRV\", \"BASEEXCESS\", \"B7LOTHDB\", \"HGBBG\", \"HCTABG\", \"OXYHEMOGLOBI\", \"METHHGBN\", \"CARBOXYHGB\", \"CO2CT\", \"BAROMETRIC\", \"MODALITY\", \"FIO2\"     Microbiology:  Blood Culture   Date Value Ref Range Status   05/19/2024 No growth at 2 days  Preliminary       Urine Culture   Date Value Ref Range Status   05/19/2024 No growth  Final     No results found for: \"VIRALCULTU\", \"WOUNDCX\"     Blood Culture   Date Value Ref Range Status   05/19/2024 No growth at 2 days  Preliminary     Urine Culture   Date Value Ref Range Status   05/19/2024 No growth  Final   (      Radiology:  Imaging Results (Last 72 Hours)       ** No results found for the last 72 hours. **            IMPRESSION:     Problems:  Acute renal failure-Creatinine has been fluctuating over the last few weeks.  Slightly better than yesterday. Unclear if this could be antitubular basement membrane antibody disease associated with Opdivo infusion. There is a case report in the literature of this occurring with concomitant acute pyelonephritis leading to acute kidney injury. Recent hypotension likely contributing. Improving  Leukocytosis with neutrophilia-unclear etiology.  Initial concern for pyelonephritis but all culture data has been negative.   Interestingly his white blood cell count has trended down off of antibiotics. C. Difficile could be " contributing. Stable today  Hypotension-now improved.  Blood pressure medications being held.  Midodrin dose being adjusted.   Nausea  Diarrhea- Resolved.  C. Difficile test was positive with positive toxin PCR and antigen test. Oral vancomycin started.  Probiotic.  Continue off other antibiotics  Normocytic anemia  Pyuria/perinephric stranding-repeat urine culture on 5/19 was again no growth but still with significant pyuria. Improved on imaging and clinically  NSCLC/RLL lobectomy in January 2024, recently on Opdivo (last dose on 4/4/24)    RECOMMENDATIONS:      -sent fungal and AFB culture from the urine-In progress  -follow repeat blood cultures-No growth to date  -follow BK virus PCR from his urine, urine histo antigen-Negative, CMV PCR from his blood, EBV IgG was positive but IgM was negative, adenovirus PCR from his blood, Fungitell BDG, and QuantiFERON Gold TB test.  Serum cryptococcal antigen was negative  -Nephrology is following.  Plan for ACTH stimulation test done today and was okay  -Continue vancomycin 125 mg by mouth every 6 hours for C. Difficile  -Probiotic  -supportive care per primary team and nephrology  -Spore precautions    UM/XAVI:  1. vancomycin 125 mg by mouth every 6 hours ×2 weeks for now.    2. I could see him back in my clinic within 2 weeks of his discharge.    I discussed in length with the patient and his girlfriend at bedside today    I discussed his complex case in length with Dr. Rosales today    Thank you for asking me to see David Barfield.        Derian Barry MD  5/23/2024

## 2024-05-23 NOTE — PLAN OF CARE
Problem: Adult Inpatient Plan of Care  Goal: Plan of Care Review  Outcome: Ongoing, Progressing  Flowsheets (Taken 5/23/2024 0530)  Progress: improving  Plan of Care Reviewed With: patient  Goal: Patient-Specific Goal (Individualized)  Outcome: Ongoing, Progressing  Goal: Absence of Hospital-Acquired Illness or Injury  Outcome: Ongoing, Progressing  Intervention: Identify and Manage Fall Risk  Recent Flowsheet Documentation  Taken 5/23/2024 0400 by Bethel Downs RN  Safety Promotion/Fall Prevention:   safety round/check completed   nonskid shoes/slippers when out of bed   clutter free environment maintained   assistive device/personal items within reach  Taken 5/23/2024 0200 by Bethel Downs RN  Safety Promotion/Fall Prevention:   safety round/check completed   nonskid shoes/slippers when out of bed   clutter free environment maintained   assistive device/personal items within reach  Taken 5/23/2024 0000 by Bethel Downs RN  Safety Promotion/Fall Prevention:   safety round/check completed   nonskid shoes/slippers when out of bed   clutter free environment maintained   assistive device/personal items within reach  Taken 5/22/2024 2200 by Bethel Downs RN  Safety Promotion/Fall Prevention:   safety round/check completed   nonskid shoes/slippers when out of bed   clutter free environment maintained   assistive device/personal items within reach  Taken 5/22/2024 2000 by Bethel Downs RN  Safety Promotion/Fall Prevention:   safety round/check completed   nonskid shoes/slippers when out of bed   clutter free environment maintained   assistive device/personal items within reach  Intervention: Prevent Skin Injury  Recent Flowsheet Documentation  Taken 5/22/2024 2000 by Bethel Downs RN  Body Position: position changed independently  Intervention: Prevent Infection  Recent Flowsheet Documentation  Taken 5/23/2024 0400 by Bethel Downs RN  Infection Prevention: environmental  surveillance performed  Taken 5/23/2024 0200 by Bethel Downs, RN  Infection Prevention: environmental surveillance performed  Taken 5/23/2024 0000 by Bethel Downs, RN  Infection Prevention: environmental surveillance performed  Taken 5/22/2024 2200 by Bethel Downs, RN  Infection Prevention: environmental surveillance performed  Taken 5/22/2024 2000 by Bethel Downs, RN  Infection Prevention: environmental surveillance performed  Goal: Optimal Comfort and Wellbeing  Outcome: Ongoing, Progressing  Goal: Readiness for Transition of Care  Outcome: Ongoing, Progressing   Goal Outcome Evaluation:  Plan of Care Reviewed With: patient        Progress: improving

## 2024-05-23 NOTE — OUTREACH NOTE
Prep Survey      Flowsheet Row Responses   [facility] facility patient discharged from? [facility]   Is LACE score < 7 ? No   Eligibility [facility]   Date of Admission 05/20/24   Date of Discharge 05/23/24   Discharge Disposition Home or Self Care   Discharge diagnosis Weakness   Does the patient have one of the following disease processes/diagnoses(primary or secondary)? Other   Does the patient have Home health ordered? No   Is there a DME ordered? No   Prep survey completed? Yes            JESSICA KAN - Registered Nurse

## 2024-05-23 NOTE — THERAPY TREATMENT NOTE
Patient Name: David Barfield  : 1950    MRN: [id number]                              Today's Date: 2024       Admit Date: 2024    Visit Dx: No diagnosis found.  Patient Active Problem List   Diagnosis    High degree atrioventricular block    Bradycardia, sinus    Chronic hypotension    PVC's (premature ventricular contractions)    Presence of cardiac pacemaker    Mixed hyperlipidemia    Centrilobular emphysema    Nodule of lower lobe of right lung    Mediastinal adenopathy    Cough    Tobacco abuse    Bronchogenic cancer of right lung    Malignant neoplasm of lower lobe of right lung    Primary hypertension    GERD without esophagitis    Septic shock    Acute pyelonephritis    ARACELI (acute kidney injury)    Hypokalemia    Severe malnutrition    Leukocytosis    CAD (coronary artery disease)    Hypotension    Stage 4 chronic kidney disease    Moderate malnutrition    Weakness     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    Coronary artery disease 2019    Diabetes mellitus Borderline    Emphysema/COPD     Erectile disorder     GERD (gastroesophageal reflux disease)     History of chemotherapy     Hyperlipidemia     Hypertension 2019    Lung nodule     Mumps     Mumps     Pruritus     after bath    Slow to wake up after anesthesia     Stage 5 chronic kidney disease not on chronic dialysis 2024    Wears dentures     upper only    Wears hearing aid in both ears     usually only wears right     Past Surgical History:   Procedure Laterality Date    BONE BIOPSY      broken bone surgery in his face    BRONCHOSCOPY THORACOTOMY Right 2024    Procedure: THORACOTOMY FOR LOWER LOBECTOMY AND MEDISTINAL LYMPH NODE DISSECTION RIGHT;  Surgeon: Joey Patel MD;  Location: [facility];  Service: Cardiothoracic;  Laterality: Right;    BRONCHOSCOPY WITH ION ROBOTIC ASSIST N/A 09/15/2023     Procedure: BRONCHOSCOPY NAVIGATION WITH ENDOBRONCHIAL ULTRASOUND AND ION ROBOT;  Surgeon: Octaviano Sampson MD;  Location:  [facility];  Service: Robotics - Pulmonary;  Laterality: N/A;  ion #6 - [id number]  - 0015  Cath guide 0061    EBUS balloon removed and intact    CARDIAC ELECTROPHYSIOLOGY PROCEDURE N/A 08/17/2021    Procedure: Pacemaker DC new;  Surgeon: Kayy Box MD;  Location:  [facility];  Service: Cardiology;  Laterality: N/A;    FACIAL FRACTURE SURGERY      LYMPH NODE BIOPSY  2023    PACEMAKER IMPLANTATION        General Information       Row Name 05/23/24 1331          Physical Therapy Time and Intention    Document Type therapy note (daily note)  -AS     Mode of Treatment physical therapy  -AS       Row Name 05/23/24 1331          General Information    Patient Profile Reviewed yes  -AS     Existing Precautions/Restrictions fall;orthostatic hypotension;other (see comments)  monitor BP, orthostatic hypotension  -AS     Barriers to Rehab hearing deficit  -AS       Row Name 05/23/24 1331          Cognition    Orientation Status (Cognition) oriented x 4  -AS       Row Name 05/23/24 1331          Safety Issues, Functional Mobility    Safety Issues Affecting Function (Mobility) awareness of need for assistance;safety precaution awareness  -AS     Impairments Affecting Function (Mobility) endurance/activity tolerance  -AS     Comment, Safety Issues/Impairments (Mobility) orthostatic hypotension, BP in standing 59/49 mmHg  -AS               User Key  (r) = Recorded By, (t) = Taken By, (c) = Cosigned By      Initials Name Provider Type    AS Marie Coello PTA Physical Therapist Assistant                   Mobility       Row Name 05/23/24 1335          Bed Mobility    Supine-Sit [place] (Bed Mobility) modified independence  -AS     Sit-Supine [place] (Bed Mobility) modified independence  -AS     Assistive Device (Bed Mobility) head of bed elevated;bed rails  -AS      Comment, (Bed Mobility) no physical assist to complete bed mobility  -AS       Row Name 05/23/24 1335          Transfers    Comment, (Transfers) good technique demonstrated, cot>bed transfer  -AS       Row Name 05/23/24 1335          Sit-Stand Transfer    Sit-Stand [place] (Transfers) standby assist  -AS     Assistive Device (Sit-Stand Transfers) other (see comments)  -AS     Comment, (Sit-Stand Transfer) no AD needed  -AS       Row Name 05/23/24 1335          Gait/Stairs (Locomotion)    [place] Level (Gait) verbal cues;contact guard;1 person assist  -AS     Assistive Device (Gait) other (see comments)  no AD  -AS     Distance in Feet (Gait) 10  -AS     Deviations/Abnormal Patterns (Gait) bilateral deviations;melissa decreased;gait speed decreased  -AS     Bilateral Gait Deviations forward flexed posture  -AS     Comment, (Gait/Stairs) Patient ambulated 10' cot>bed with CGA for safety, no AD needed, unable to progress mobility due to drop in blood pressure 59/49 mmHg.  -AS               User Key  (r) = Recorded By, (t) = Taken By, (c) = Cosigned By      Initials Name Provider Type    AS Marie Coello PTA Physical Therapist Assistant                   Obj/Interventions       Row Name 05/23/24 1339          Motor Skills    Therapeutic Exercise knee;ankle  -AS       Row Name 05/23/24 1339          Knee (Therapeutic Exercise)    Knee (Therapeutic Exercise) strengthening exercise  -AS     Knee Strengthening (Therapeutic Exercise) bilateral;marching while seated;LAQ (long arc quad);sitting;10 repetitions  -AS       [facility] Name 05/23/24 1339          Ankle (Therapeutic Exercise)    Ankle (Therapeutic Exercise) AROM (active range of motion)  -AS     Ankle AROM (Therapeutic Exercise) bilateral;dorsiflexion;plantarflexion;sitting;10 repetitions  -AS       Row Name 05/23/24 1339          Balance    Dynamic Standing Balance verbal cues;contact guard;1-person assist  -AS     Position/Device Used, Standing  Balance unsupported  -AS     Comment, Balance CGA for safety  -AS               User Key  (r) = Recorded By, (t) = Taken By, (c) = Cosigned By      Initials Name Provider Type    AS Marie Coello PTA Physical Therapist Assistant                   Goals/Plan    No documentation.                  Clinical Impression       Row Name 05/23/24 1341          Pain    Pretreatment Pain Rating 0/10 - no pain  -AS     Posttreatment Pain Rating 0/10 - no pain  -AS     Pre/Posttreatment Pain Comment dizziness  -AS     Pain Intervention(s) Repositioned;Ambulation/increased activity  -AS       Row Name 05/23/24 1341          Plan of Care Review    Plan of Care Reviewed With patient  -AS     Progress no change  -AS     Outcome Evaluation Patient ambulated 10' cot>bed with CGA for safety, no AD needed, unable to progress mobility due to drop in blood pressure 59/49 mmHg. Completed B LE exercises at EOB. Recommend home with assist at D/C.  -AS       Row Name 05/23/24 1341          Positioning and Restraints    Pre-Treatment Position other (comment)  siting on cot  -AS     Post Treatment Position bed  -AS     In Bed supine;call light within reach;encouraged to call for assist;with family/caregiver  -AS               User Key  (r) = Recorded By, (t) = Taken By, (c) = Cosigned By      Initials Name Provider Type    AS Marie Coello PTA Physical Therapist Assistant                   Outcome Measures       Row Name 05/23/24 1347 05/23/24 0800       How much help from another person do you currently need...    Turning from your back to your side while in flat bed without using bedrails? 4  -AS 4  -DB    Moving from lying on back to sitting on the side of a flat bed without bedrails? 4  -AS 4  -DB    Moving to and from a bed to a chair (including a wheelchair)? 4  -AS 4  -DB    Standing up from a chair using your arms (e.g., wheelchair, bedside chair)? 4  -AS 4  -DB    Climbing 3-5 steps with a railing? 3  -AS 3  -DB    To  walk in hospital room? 3  -AS 4  -DB    AM-PAC 6 Clicks Score (PT) 22  -AS 23  -DB    Highest Level of Mobility Goal 7 --> Walk 25 feet or more  -AS 7 --> Walk 25 feet or more  -DB      Row Name 05/23/24 0500          How much help from another person do you currently need...    Turning from your back to your side while in flat bed without using bedrails? 4  -ASA     Moving from lying on back to sitting on the side of a flat bed without bedrails? 4  -ASA     Moving to and from a bed to a chair (including a wheelchair)? 4  -ASA     Standing up from a chair using your arms (e.g., wheelchair, bedside chair)? 4  -ASA     Climbing 3-5 steps with a railing? 3  -ASA     To walk in hospital room? 4  -ASA     AM-PAC 6 Clicks Score (PT) 23  -ASA     Highest Level of Mobility Goal 7 --> Walk 25 feet or more  -ASA       Row Name 05/23/24 1347          Functional Assessment    Outcome Measure Options AM-PAC 6 Clicks Basic Mobility (PT)  -AS               User Key  (r) = Recorded By, (t) = Taken By, (c) = Cosigned By      Initials Name Provider Type    AS Marie Coello, AMAYA Physical Therapist Assistant    Raulito Vallejo, RN Registered Nurse    Bethel Recinos, RN Registered Nurse                                 Physical Therapy Education       Title: PT OT SLP Therapies (In Progress)       Topic: Physical Therapy (In Progress)       Point: Mobility training (In Progress)       Learning Progress Summary             Patient Acceptance, E, NR by AS at 5/23/2024 1347    Acceptance, E, NR by ND at 5/21/2024 1043                         Point: Home exercise program (In Progress)       Learning Progress Summary             Patient Acceptance, E, NR by AS at 5/23/2024 1347                         Point: Body mechanics (In Progress)       Learning Progress Summary             Patient Acceptance, E, NR by AS at 5/23/2024 1347    Acceptance, E, NR by ND at 5/21/2024 1043                         Point: Precautions (In  Progress)       Learning Progress Summary             Patient Acceptance, E, NR by AS at 5/23/2024 1347    Acceptance, E, NR by ND at 5/21/2024 1043                                         User Key       Initials Effective Dates Name Provider Type Discipline    AS 04/28/23 -  Marie Coello PTA Physical Therapist Assistant PT    ND 11/16/23 -  Suzanne Thacker PT Physical Therapist PT                  PT Recommendation and Plan     Plan of Care Reviewed With: patient  Progress: no change  Outcome Evaluation: Patient ambulated 10' cot>bed with CGA for safety, no AD needed, unable to progress mobility due to drop in blood pressure 59/49 mmHg. Completed B LE exercises at EOB. Recommend home with assist at D/C.     Time Calculation:         PT Charges       Row Name 05/23/24 1347             Time Calculation    Start Time 1301  -AS      PT Received On 05/23/24  -AS      PT Goal Re-Cert Due Date 05/31/24  -AS         Timed Charges    89431 - PT Therapeutic Exercise Minutes 15  -AS      63993 - Gait Training Minutes  8  -AS         Total Minutes    Timed Charges Total Minutes 23  -AS       Total Minutes 23  -AS                User Key  (r) = Recorded By, (t) = Taken By, (c) = Cosigned By      Initials Name Provider Type    AS Marie Coello PTA Physical Therapist Assistant                  Therapy Charges for Today       Code Description Service Date Service Provider Modifiers Qty    93355696609 HC PT THER PROC EA 15 MIN 5/23/2024 Marie Coello PTA GP 1    68836166418 HC GAIT TRAINING EA 15 MIN 5/23/2024 Marie Coello PTA GP 1            PT G-Codes  Outcome Measure Options: AM-PAC 6 Clicks Basic Mobility (PT)  AM-PAC 6 Clicks Score (PT): 22  AM-PAC 6 Clicks Score (OT): 21       Marie Coello PTA  5/23/2024

## 2024-05-23 NOTE — DISCHARGE SUMMARY
[facility] Medicine Services  DISCHARGE SUMMARY    Patient Name: David Barfield  : 1950  MRN: [id number]    Date of Admission: 2024  2:13 PM  Date of Discharge: 2024  Primary Care Physician: Hayley Castellanos APRN    Consults       Date and Time Order Name Status Description    2024 10:29 AM Inpatient Cardiology Consult Completed     2024  4:27 PM Inpatient Nephrology Consult Completed     2024  4:27 PM Inpatient Hematology & Oncology Consult Completed     2024  4:26 PM Inpatient Infectious Diseases Consult Completed     5/15/2024 11:48 AM Inpatient Nephrology Consult Completed     2024 12:10 PM Inpatient Nephrology Consult Completed     2024  3:25 AM Inpatient Infectious Diseases Consult Completed     2024  2:19 PM Inpatient Hematology & Oncology Consult Completed     2024  9:52 AM Inpatient Nephrology Consult Completed     2024  3:19 PM Inpatient Infectious Diseases Consult Completed     2024  2:55 AM Inpatient Infectious Diseases Consult Completed             Hospital Course     Active Hospital Problems    Diagnosis  POA    **Weakness [R53.1]  Yes    Hypotension [I95.9]  Yes    Stage 4 chronic kidney disease [N18.4]  Yes    CAD (coronary artery disease) [I25.10]  Yes    Leukocytosis [D72.829]  Yes    Severe malnutrition [E43]  Yes    Malignant neoplasm of lower lobe of right lung [C34.31]  Yes      Resolved Hospital Problems   No resolved problems to display.        Hospital Course:  David Barfield is a 74 y.o. male with hx of NSCLC s/p neoadjuvant chemo, RLL lobectomy 2024, subsequent immunotherapy with Opdivo (last dose ~24), CAD, HTN, AV block s/p PPM, hypotension, emphysema, tobacco use, CKD 4-5, with 4 admissions since last month. He was discharged on  on cefepime however returned to ED  with ongoing soa and hypotension. He left AMA and was admitted directly at the request of Dr. Menard  Asha.     Hypotension, fluid responsive, resolved  -BP much improved s/p IVF,   -Recent workup included (but not limited to), TTE (normal EF without significant valvular disease), appropriate cortisol, normal TSH  -ACTH stimulation test was negative  -d/c home amlodipine indefinitely, continue midodrine     Recurrent treatment for presumed sepsis  -Dr. Barry followed - had been on cefepime at home until 5/20.  - follow up BK, CMV, adenovirus, fungitel, EBV, GI PCR, blood cultures from port/peripheral, histo.    C diff positive  -ID rec to start PO vancomycin 125mg q6hr, plan to treat for 2 weeks  -Follow-up in ID clinic in 2 weeks     ARACELI on CKD stage 4   - His creatinine is better than it was when he left last week.  Continues to improve  - Nephrology followed recommend oral hydration, sodium bicarb tabs and continue midodrine as needed     NSCLC  --s/p neoadjuvant chemo, RLL lobectomy 1/2024, immunotherapy (last dose Opdivo 4/4/24)  --Follows with Dr. Ashley, further treatment on hold due to renal and infectious issues. She is consulted     CAD  AV block s/p PPM  Hx HTN  --Continue statin, holding Amlodipine due to hypotension  -Family requested cardiology consult, patient seen by jamie De La O to continue IV fluids, hold antihypertensives, continue midodrine     Chronic anemia  -- Overall stable  -Continue iron supplement     Emphysema  Tobacco abuse  --Trelegy      Impaired glucose tolerance  --Last HbA1c 6.3%     Severe malnutrition    Discharge Follow Up Recommendations for outpatient labs/diagnostics:  Follow-up with PCP in 1 week  Follow-up with ID in 2 weeks    Day of Discharge     HPI: No acute events overnight, patient rested well feels much better, ready to go home    Review of Systems  Gen- No fevers, chills  CV- No chest pain, palpitations  Resp- No cough, dyspnea  GI- No N/V/D, abd pain      Vital Signs:   Temp:  [97.4 °F (36.3 °C)-99 °F (37.2 °C)] 97.8 °F (36.6 °C)  Heart Rate:   [70-77] 70  Resp:  [16-18] 18  BP: (107-117)/(62-72) 112/69      Physical Exam:  Constitutional: No acute distress, awake, alert  HENT: NCAT, mucous membranes moist  Respiratory: Clear to auscultation bilaterally, respiratory effort normal   Cardiovascular: RRR, no murmurs, rubs, or gallops  Gastrointestinal: Positive bowel sounds, soft, nontender, nondistended  Musculoskeletal: No bilateral ankle edema  Psychiatric: Appropriate affect, cooperative  Neurologic: Oriented x 3, strength symmetric in all extremities, Cranial Nerves grossly intact to confrontation, speech clear  Skin: No rashes      Pertinent  and/or Most Recent Results     LAB RESULTS:      Lab 05/23/24  0715 05/22/24 0310 05/21/24 0448 05/20/24 1820 05/19/24  0940   WBC 15.69* 15.36* 17.18* 22.57* 21.85*   HEMOGLOBIN 9.0* 9.2* 8.9* 9.5* 10.0*   HEMATOCRIT 27.6* 28.1* 28.0* 29.8* 32.3*   PLATELETS 269 290 288 324 326   NEUTROS ABS 12.05* 11.94*  --  19.28* 19.05*   IMMATURE GRANS (ABS) 0.31* 0.33*  --  0.31* 0.19*   LYMPHS ABS 1.35 1.20  --  0.94 1.02   MONOS ABS 1.89* 1.81*  --  1.82* 1.37*   EOS ABS 0.05 0.05  --  0.16 0.16   MCV 89.6 93.4 92.7 91.7 94.7   PROCALCITONIN  --   --   --   --  1.03*   LACTATE  --   --   --   --  1.3         Lab 05/22/24  0310 05/21/24 0448 05/20/24  1820 05/19/24  0940 05/17/24  0529   SODIUM 133* 134* 133* 136 137   POTASSIUM 4.2 4.3 4.4 4.2 4.5   CHLORIDE 102 103 101 104 106   CO2 16.0* 17.0* 17.0* 19.0* 18.0*   ANION GAP 15.0 14.0 15.0 13.0 13.0   BUN 33* 37* 36* 34* 35*   CREATININE 3.86* 4.49* 4.57* 4.73* 5.67*   EGFR 15.5* 12.9* 12.7* 12.2* 9.8*   GLUCOSE 85 91 108* 118* 95   CALCIUM 8.3* 8.4* 8.9 9.2 8.5*   MAGNESIUM  --   --   --  1.5*  --    PHOSPHORUS 3.7  --   --   --   --          Lab 05/22/24  0310 05/20/24  1820 05/19/24  0940   TOTAL PROTEIN  --  7.6 7.3   ALBUMIN 3.0* 3.4* 3.6   GLOBULIN  --  4.2 3.7   ALT (SGPT)  --  9 9   AST (SGOT)  --  13 11   BILIRUBIN  --  0.2 0.2   ALK PHOS  --  101 98          Lab 05/19/24  0940   [facility]                 Brief Urine Lab Results  (Last result in the past 365 days)        Color   Clarity   Blood   Leuk Est   Nitrite   Protein   CREAT   Urine HCG        05/19/24 1036 Yellow   Cloudy   Small (1+)   Large (3+)   Negative   100 mg/dL (2+)                 Microbiology Results (last 10 days)       Procedure Component Value - Date/Time    Clostridioides difficile Toxin - Stool, Per Rectum [[id number]]  (Abnormal) Collected: 05/22/24 0910    Lab Status: Final result Specimen: Stool from Per Rectum Updated: 05/22/24 1104    Narrative:      The following orders were created for panel order Clostridioides difficile Toxin - Stool, Per Rectum.  Procedure                               Abnormality         Status                     ---------                               -----------         ------                     Clostridioides difficile...[[id number]]  Abnormal            Final result                 Please view results for these tests on the individual orders.    Clostridioides difficile Toxin, PCR - Stool, Per Rectum [[id number]]  (Abnormal) Collected: 05/22/24 0910    Lab Status: Final result Specimen: Stool from Per Rectum Updated: 05/22/24 1104     Toxigenic C. difficile by PCR Detected    Narrative:      DNA from a toxigenic strain of C.difficile has been detected.    Clostridioides difficile toxin Ag, Reflex - Stool, Per Rectum [[id number]]  (Abnormal) Collected: 05/22/24 0910    Lab Status: Final result Specimen: Stool from Per Rectum Updated: 05/22/24 1137     C.diff Toxin Ag Positive    Narrative:      DNA from a toxigenic strain of C.difficile was detected, along with the presence of free toxin. These results are suggestive of C.difficile infection.    AFB Culture - Body Fluid, Urine, Random Void [[id number]] Collected: 05/21/24 1914    Lab Status: Preliminary result Specimen: Body Fluid from Urine, Random Void Updated: 05/22/24 1152     AFB Stain No acid fast bacilli  seen on concentrated smear    Gastrointestinal Panel, PCR - Stool, Per Rectum [[id number]]  (Normal) Collected: 05/21/24 0841    Lab Status: Final result Specimen: Stool from Per Rectum Updated: 05/21/24 1027     Campylobacter Not Detected     Plesiomonas shigelloides Not Detected     Salmonella Not Detected     Vibrio Not Detected     Vibrio cholerae Not Detected     Yersinia enterocolitica Not Detected     Enteroaggregative E. coli (EAEC) Not Detected     Enteropathogenic E. coli (EPEC) Not Detected     Enterotoxigenic E. coli (ETEC) lt/st Not Detected     Shiga-like toxin-producing E. coli (STEC) stx1/stx2 Not Detected     Shigella/Enteroinvasive E. coli (EIEC) Not Detected     Cryptosporidium Not Detected     Cyclospora cayetanensis Not Detected     Entamoeba histolytica Not Detected     Giardia lamblia Not Detected     Adenovirus F40/41 Not Detected     Astrovirus Not Detected     Norovirus GI/GII Not Detected     Rotavirus A Not Detected     Sapovirus (I, II, IV or V) Not Detected    Blood Culture - Blood, Chest, Left [[id number]]  (Normal) Collected: 05/20/24 1815    Lab Status: Preliminary result Specimen: Blood from Chest, Left Updated: 05/22/24 1900     Blood Culture No growth at 2 days    Blood Culture - Blood, Hand, Left [[id number]]  (Normal) Collected: 05/20/24 1810    Lab Status: Preliminary result Specimen: Blood from Hand, Left Updated: 05/22/24 1900     Blood Culture No growth at 2 days    Blood Culture - Blood, Arm, Left [[id number]]  (Normal) Collected: 05/20/24 1800    Lab Status: Preliminary result Specimen: Blood from Arm, Left Updated: 05/22/24 1900     Blood Culture No growth at 2 days    Urine Culture - Urine, Urine, Clean Catch [[id number]]  (Normal) Collected: 05/19/24 1036    Lab Status: Final result Specimen: Urine, Clean Catch Updated: 05/20/24 1023     Urine Culture No growth    Blood Culture - Blood, Arm, Left [[id number]]  (Normal) Collected: 05/19/24 1018    Lab Status: Preliminary  result Specimen: Blood from Arm, Left Updated: 05/23/24 1030     Blood Culture No growth at 4 days    Narrative:      Less than seven (7) mL's of blood was collected.  Insufficient quantity may yield false negative results.    Blood Culture - Blood, Arm, Left [[id number]]  (Normal) Collected: 05/19/24 1014    Lab Status: Preliminary result Specimen: Blood from Arm, Left Updated: 05/23/24 1030     Blood Culture No growth at 4 days    Narrative:      Less than seven (7) mL's of blood was collected.  Insufficient quantity may yield false negative results.    Respiratory Panel PCR w/COVID-19(SARS-CoV-2) [facility]/DENA/PAD/[facility]/JEROME In-House, NP Swab in UTM/VTM, 2 HR TAT - Swab, Nasopharynx [[id number]]  (Normal) Collected: 05/14/24 0808    Lab Status: Final result Specimen: Swab from Nasopharynx Updated: 05/14/24 0925     ADENOVIRUS, PCR Not Detected     Coronavirus 229E Not Detected     Coronavirus HKU1 Not Detected     Coronavirus NL63 Not Detected     Coronavirus OC43 Not Detected     COVID19 Not Detected     Human Metapneumovirus Not Detected     Human Rhinovirus/Enterovirus Not Detected     Influenza A PCR Not Detected     Influenza B PCR Not Detected     Parainfluenza Virus 1 Not Detected     Parainfluenza Virus 2 Not Detected     Parainfluenza Virus 3 Not Detected     Parainfluenza Virus 4 Not Detected     RSV, PCR Not Detected     Bordetella pertussis pcr Not Detected     Bordetella parapertussis PCR Not Detected     Chlamydophila pneumoniae PCR Not Detected     Mycoplasma pneumo by PCR Not Detected    Narrative:      In the setting of a positive respiratory panel with a viral infection PLUS a negative procalcitonin without other underlying concern for bacterial infection, consider observing off antibiotics or discontinuation of antibiotics and continue supportive care. If the respiratory panel is positive for atypical bacterial infection (Bordetella pertussis, Chlamydophila pneumoniae, or Mycoplasma pneumoniae),  consider antibiotic de-escalation to target atypical bacterial infection.    Urine Culture - Urine, Urine, Catheter [[id number]]  (Normal) Collected: 05/14/24 0616    Lab Status: Final result Specimen: Urine, Catheter Updated: 05/15/24 1120     Urine Culture No growth    Blood Culture - Blood, Arm, Left [[id number]]  (Normal) Collected: 05/14/24 0439    Lab Status: Final result Specimen: Blood from Arm, Left Updated: 05/19/24 0446     Blood Culture No growth at 5 days    COVID-19, FLU A/B, RSV PCR 1 HR TAT - Swab, Nasopharynx [[id number]]  (Normal) Collected: 05/14/24 0416    Lab Status: Final result Specimen: Swab from Nasopharynx Updated: 05/14/24 0513     COVID19 Not Detected     Influenza A PCR Not Detected     Influenza B PCR Not Detected     RSV, PCR Not Detected    Narrative:      Fact sheet for providers: https://www.fda.gov/media/527295/download    Fact sheet for patients: https://www.fda.gov/media/503406/download    Test performed by PCR.    Blood Culture - Blood, Arm, Right [[id number]]  (Normal) Collected: 05/14/24 0412    Lab Status: Final result Specimen: Blood from Arm, Right Updated: 05/19/24 0446     Blood Culture No growth at 5 days            CT Chest Without Contrast Diagnostic    Result Date: 5/19/2024  CT CHEST WO CONTRAST DIAGNOSTIC, CT ABDOMEN PELVIS WO CONTRAST Date of Exam: 5/19/2024 11:23 AM EDT Indication: soa. lung ca. being tx for sepsis.. Comparison: 5/6/2024 Technique: Axial CT images were obtained of the chest, abdomen, and pelvis without contrast administration.  Reconstructed coronal and sagittal images were also obtained. Automated exposure control and iterative construction methods were used. Findings: Stable biapical scarring more pronounced on the right again noted. Subpleural blebs. Right-sided volume loss related to postsurgical changes. Soft tissue along the right suture line has decreased compared with the examination performed on 4/24/2024 but is stable compared with 5/6/2024  and decreased compared with 2/21/2024 possibly postsurgical changes. Residual underlying tumor not excluded. This measures 4.3 cm x 1.7 cm. Small right pleural effusion. Calcified granulomata. No pneumothorax. Calcified mediastinal and hilar lymph nodes consistent with prior granulomatous disease. 3.1 cm x 1.8 cm subcarinal lymph node. Severe atheromatous disease of the coronary vessels. Liver:No masses. No intrahepatic biliary ductal dilatation. Spleen:No masses. No perisplenic hematoma. Pancreas:No pancreatic masses. No evidence of pancreatitis. Gallbladder and common bile duct:No evidence of cholelithiasis. No evidence of cholecystitis. Adrenal glands:No adrenal masses Kidneys and ureters:No kidney stones. No renal masses.No calculi present within the ureters. Normal caliber ureters. Urinary bladder:No urinary bladder wall thickening. No bladder masses. Small bowel:Normal caliber small bowel. Large bowel: Diffuse large bowel wall thickening with surrounding infiltration of the fat consistent with colitis. Appendix: Normal GENITOURINARY: Normal prostate Ascites or pneumoperitoneum:None. Adenopathy: No adenopathy within the pelvis. Osseous structures: Degenerative changes of the hips and lumbar spine. No lytic or blastic disease is definitively identified. Other findings: None     Colitis. No abscess formation or perforation. Small right pleural effusion. Stable appearance of the right lower lobe density. Electronically Signed: Kam Foy MD  5/19/2024 11:43 AM EDT  Workstation ID: [id number]    CT Abdomen Pelvis Without Contrast    Result Date: 5/19/2024  CT CHEST WO CONTRAST DIAGNOSTIC, CT ABDOMEN PELVIS WO CONTRAST Date of Exam: 5/19/2024 11:23 AM EDT Indication: soa. lung ca. being tx for sepsis.. Comparison: 5/6/2024 Technique: Axial CT images were obtained of the chest, abdomen, and pelvis without contrast administration.  Reconstructed coronal and sagittal images were also obtained. Automated exposure  control and iterative construction methods were used. Findings: Stable biapical scarring more pronounced on the right again noted. Subpleural blebs. Right-sided volume loss related to postsurgical changes. Soft tissue along the right suture line has decreased compared with the examination performed on 4/24/2024 but is stable compared with 5/6/2024 and decreased compared with 2/21/2024 possibly postsurgical changes. Residual underlying tumor not excluded. This measures 4.3 cm x 1.7 cm. Small right pleural effusion. Calcified granulomata. No pneumothorax. Calcified mediastinal and hilar lymph nodes consistent with prior granulomatous disease. 3.1 cm x 1.8 cm subcarinal lymph node. Severe atheromatous disease of the coronary vessels. Liver:No masses. No intrahepatic biliary ductal dilatation. Spleen:No masses. No perisplenic hematoma. Pancreas:No pancreatic masses. No evidence of pancreatitis. Gallbladder and common bile duct:No evidence of cholelithiasis. No evidence of cholecystitis. Adrenal glands:No adrenal masses Kidneys and ureters:No kidney stones. No renal masses.No calculi present within the ureters. Normal caliber ureters. Urinary bladder:No urinary bladder wall thickening. No bladder masses. Small bowel:Normal caliber small bowel. Large bowel: Diffuse large bowel wall thickening with surrounding infiltration of the fat consistent with colitis. Appendix: Normal GENITOURINARY: Normal prostate Ascites or pneumoperitoneum:None. Adenopathy: No adenopathy within the pelvis. Osseous structures: Degenerative changes of the hips and lumbar spine. No lytic or blastic disease is definitively identified. Other findings: None     Colitis. No abscess formation or perforation. Small right pleural effusion. Stable appearance of the right lower lobe density. Electronically Signed: Kam Foy MD  5/19/2024 11:43 AM EDT  Workstation ID: [id number]    XR Chest 1 View    Result Date: 5/19/2024  XR CHEST 1 VW Date of Exam:  5/19/2024 9:37 AM EDT Indication: Weak/Dizzy/AMS triage protocol Comparison: Chest radiograph 5/14/2024. Findings: Unchanged appearance of the right chest pacemaker and left chest port. Cardiomediastinal silhouette is unchanged. Pulmonary vascular congestion with mild diffuse interstitial thickening. Background of chronic/emphysematous changes. No dominant, focal consolidation. Small/moderate right pleural effusion. No sizable left pleural effusion. No pneumothorax. Osseous structures are unchanged.     Impression: Findings suggestive of mild pulmonary edema with small/moderate right pleural effusion. No dominant, focal consolidation. Finding superimposed upon a background of chronic/emphysematous changes. Electronically Signed: Marco Leahy MD  5/19/2024 9:58 AM EDT  Workstation ID: [id number]    XR Chest 1 View    Result Date: 5/14/2024  Examination: XR CHEST 1 VW-  Date of Exam: 5/14/2024 3:48 AM  Indication: cough.  Comparison: 5/11/2024.  Technique: 1 view of the chest  Findings: No significant consolidation. Small right-sided pleural effusion present, stable to improved as compared to the previous study. Stable right subclavian AICD/pacemaker device. Stable left-sided Mediport. No pneumothorax. The heart size is normal. The pulmonary vasculature appears within normal limits. No acute osseous abnormality identified.      Small right-sided pleural effusion, stable to improved as compared to the previous study. No significant airspace disease.  This report was finalized on 5/14/2024 4:10 AM by Desiree Matthews MD.               Results for orders placed during the hospital encounter of 05/05/24    Adult Transesophageal Echo 3D (DAMIÁN) W/ Cont If Necessary Per Protocol    Interpretation Summary    Left ventricular systolic function is normal. Left ventricular ejection fraction appears to be 61 - 65%. Normal left ventricular cavity size noted. Left ventricular wall thickness is consistent with mild concentric hypertrophy.  All left ventricular wall segments contract normally.    There is mild calcification of the aortic valve. The aortic valve appears trileaflet. Mild aortic valve regurgitation is present. No aortic valve stenosis is present. There is no evidence of an aortic valve mass is present.    There is mild calcification of the mitral valve. There is mild, bileaflet mitral valve thickening present. No evidence of a mitral valve mass is present. Mild to moderate mitral valve regurgitation is present. No significant mitral valve stenosis is present.    The tricuspid valve is structurally normal with no significant stenosis present. There is no evidence of a mass on the tricuspid valve. Trace tricuspid valve regurgitation is present.    The pulmonic valve is grossly normal in structure. There is trace pulmonic valve regurgitation present.    No vegetation seen on atrial aspect of pacemaker leads.  The most distal aspects of the RV lead were not completely visualized however there was no apparent vegetation in the more proximal segment, adjacent to the tricuspid valve which appears to be functioning normally.      Plan for Follow-up of Pending Labs/Results: ID  Pending Labs       Order Current Status    ACTH In process    Adenovirus qPCR (Serum) In process    Aldosterone In process    BK Virus, PCR, Urine, Quantitative - Urine, Clean Catch In process    DHEA-Sulfate In process    Fungitell B-D Glucan In process    Fungus Culture - Body Fluid, Urine, Random Void In process    Histoplasma Ag Ur - Urine, Urine, Clean Catch In process    Ova & Parasite Examination - Stool, Per Rectum In process    QuantiFERON-TB Gold Plus In process    Renin Direct Assay In process    AFB Culture - Body Fluid, Urine, Random Void Preliminary result    Blood Culture - Blood, Arm, Left Preliminary result    Blood Culture - Blood, Chest, Left Preliminary result    Blood Culture - Blood, Hand, Left Preliminary result          Discharge Details       "  Discharge Medications        New Medications        Instructions Start Date   sodium bicarbonate 650 MG tablet   1,300 mg, Oral, 3 Times Daily      vancomycin 125 MG capsule  Commonly known as: VANCOCIN   125 mg, Oral, Every 6 Hours Scheduled             Changes to Medications        Instructions Start Date   midodrine 10 MG tablet  Commonly known as: PROAMATINE  What changed: how much to take   10 mg, Oral, 3 Times Daily PRN             Continue These Medications        Instructions Start Date   albuterol sulfate  (90 Base) MCG/ACT inhaler  Commonly known as: PROVENTIL HFA;VENTOLIN HFA;PROAIR HFA   2 puffs, Inhalation, Every 4 Hours PRN      ferrous sulfate 325 (65 FE) MG tablet   325 mg, Oral, Daily With Breakfast, OTC      fluticasone 27.5 MCG/SPRAY nasal spray  Commonly known as: VERAMYST   2 sprays, Nasal, Once As Needed      HYDROcodone-acetaminophen 7.5-325 MG per tablet  Commonly known as: Norco   1 tablet, Oral, Every 6 Hours PRN      lidocaine-prilocaine 2.5-2.5 % cream  Commonly known as: EMLA   1 application , Topical, As Needed      omeprazole 40 MG capsule  Commonly known as: priLOSEC   40 mg, Oral, Daily      ondansetron 8 MG tablet  Commonly known as: ZOFRAN   8 mg, Oral, 3 Times Daily PRN      pravastatin 80 MG tablet  Commonly known as: PRAVACHOL   80 mg, Oral, Nightly      Trelegy Ellipta 100-62.5-25 MCG/ACT inhaler  Generic drug: Fluticasone-Umeclidin-Vilant   1 puff, Inhalation, Daily - RT      vitamin b complex capsule capsule   1 capsule, Oral, Daily, OTC             Stop These Medications      amLODIPine 5 MG tablet  Commonly known as: NORVASC     cefepime 2000 mg IVPB in 100 mL NS (MBP)     sildenafil 100 MG tablet  Commonly known as: VIAGRA              Allergies   Allergen Reactions    Cymbalta [Duloxetine Hcl] GI Intolerance    Gabapentin Mental Status Change     Pt states that this medication \"makes him feel foolish in his head\".     Remeron [Mirtazapine] Other (See Comments)    "  Excess sedation    Toradol [Ketorolac Tromethamine] GI Intolerance     Projectile vomiting     Latex Other (See Comments)     Latex allergy     Tape Rash         Discharge Disposition:  Home or Self Care    Diet:  Hospital:  Diet Order   Procedures    Diet: Regular/House; Fluid Consistency: Thin (IDDSI 0)            Activity:      Restrictions or Other Recommendations:         CODE STATUS:    Code Status and Medical Interventions:   Ordered at: 05/20/24 1626     Code Status (Patient has no pulse and is not breathing):    CPR (Attempt to Resuscitate)     Medical Interventions (Patient has pulse or is breathing):    Full Support       Future Appointments   Date Time Provider Department [facility]   5/28/2024  1:45 PM Hayley Castellanos APRN [facility]   6/3/2024  4:45 PM Hayley Castellanos APRN [facility]   6/19/2024  8:45 AM Neetu Ashley MD [facility]   6/20/2024 11:15 AM HAM NM ADMIN RM 1 BH HAM PET None   6/20/2024 12:15 PM HAM PET BH HAM PET None   7/10/2024 11:00 AM Kayy Box MD [facility]   8/19/2024  1:30 PM [facility], PFT LAB 2 [facility]   8/19/2024  2:15 PM Octaviano Sampson MD [facility]                 Roc Rosales MD  05/23/24      Time Spent on Discharge:  I spent  35  minutes on this discharge activity which included: face-to-face encounter with the patient, reviewing the data in the system, coordination of the care with the nursing staff as well as consultants, documentation, and entering orders.

## 2024-05-23 NOTE — PLAN OF CARE
Problem: Adult Inpatient Plan of Care  Goal: Plan of Care Review  Outcome: Ongoing, Progressing  Flowsheets (Taken 5/23/2024 1350)  Progress: improving  Plan of Care Reviewed With: patient  Goal: Patient-Specific Goal (Individualized)  Outcome: Ongoing, Progressing  Goal: Absence of Hospital-Acquired Illness or Injury  Outcome: Ongoing, Progressing  Intervention: Identify and Manage Fall Risk  Recent Flowsheet Documentation  Taken 5/23/2024 0800 by Raulito Rubin RN  Safety Promotion/Fall Prevention:   activity supervised   nonskid shoes/slippers when out of bed   room organization consistent   safety round/check completed  Intervention: Prevent Skin Injury  Recent Flowsheet Documentation  Taken 5/23/2024 0800 by Raulito Rubin RN  Body Position:   position changed independently   weight shifting  Skin Protection:   adhesive use limited   tubing/devices free from skin contact  Intervention: Prevent and Manage VTE (Venous Thromboembolism) Risk  Recent Flowsheet Documentation  Taken 5/23/2024 0800 by Raulito Rubin RN  Activity Management: activity encouraged  VTE Prevention/Management:   bilateral   sequential compression devices off   patient refused intervention  Intervention: Prevent Infection  Recent Flowsheet Documentation  Taken 5/23/2024 0800 by Raulito Rubin RN  Infection Prevention:   environmental surveillance performed   equipment surfaces disinfected   hand hygiene promoted   personal protective equipment utilized   rest/sleep promoted   single patient room provided  Goal: Optimal Comfort and Wellbeing  Outcome: Ongoing, Progressing  Intervention: Provide Person-Centered Care  Recent Flowsheet Documentation  Taken 5/23/2024 0800 by Raulito Rubin RN  Trust Relationship/Rapport:   care explained   choices provided   emotional support provided   empathic listening provided   questions answered   questions encouraged   reassurance provided   thoughts/feelings acknowledged  Goal: Readiness for  Transition of Care  Outcome: Ongoing, Progressing     Problem: Skin Injury Risk Increased  Goal: Skin Health and Integrity  Outcome: Ongoing, Progressing  Intervention: Optimize Skin Protection  Recent Flowsheet Documentation  Taken 5/23/2024 0800 by Raulito Rubin RN  Pressure Reduction Techniques:   frequent weight shift encouraged   weight shift assistance provided  Head of Bed (HOB) Positioning: HOB elevated  Pressure Reduction Devices:   positioning supports utilized   pressure-redistributing mattress utilized   specialty bed utilized  Skin Protection:   adhesive use limited   tubing/devices free from skin contact     Problem: Asthma Comorbidity  Goal: Maintenance of Asthma Control  Outcome: Ongoing, Progressing  Intervention: Maintain Asthma Symptom Control  Recent Flowsheet Documentation  Taken 5/23/2024 0800 by Raulito Rubin RN  Medication Review/Management: medications reviewed     Problem: Behavioral Health Comorbidity  Goal: Maintenance of Behavioral Health Symptom Control  Outcome: Ongoing, Progressing  Intervention: Maintain Behavioral Health Symptom Control  Recent Flowsheet Documentation  Taken 5/23/2024 0800 by Raulito Rubin RN  Medication Review/Management: medications reviewed     Problem: COPD (Chronic Obstructive Pulmonary Disease) Comorbidity  Goal: Maintenance of COPD Symptom Control  Outcome: Ongoing, Progressing  Intervention: Maintain COPD-Symptom Control  Recent Flowsheet Documentation  Taken 5/23/2024 0800 by Raulito Rubin RN  Supportive Measures:   active listening utilized   problem-solving facilitated   self-responsibility promoted   verbalization of feelings encouraged  Medication Review/Management: medications reviewed     Problem: Diabetes Comorbidity  Goal: Blood Glucose Level Within Targeted Range  Outcome: Ongoing, Progressing     Problem: Heart Failure Comorbidity  Goal: Maintenance of Heart Failure Symptom Control  Outcome: Ongoing, Progressing  Intervention: Maintain  Heart Failure-Management  Recent Flowsheet Documentation  Taken 5/23/2024 0800 by Raulito Rubin RN  Medication Review/Management: medications reviewed     Problem: Hypertension Comorbidity  Goal: Blood Pressure in Desired Range  Outcome: Ongoing, Progressing  Intervention: Maintain Blood Pressure Management  Recent Flowsheet Documentation  Taken 5/23/2024 0800 by Raulito Rubin RN  Medication Review/Management: medications reviewed     Problem: Obstructive Sleep Apnea Risk or Actual Comorbidity Management  Goal: Unobstructed Breathing During Sleep  Outcome: Ongoing, Progressing     Problem: Osteoarthritis Comorbidity  Goal: Maintenance of Osteoarthritis Symptom Control  Outcome: Ongoing, Progressing  Intervention: Maintain Osteoarthritis Symptom Control  Recent Flowsheet Documentation  Taken 5/23/2024 0800 by Raulito Rubin RN  Activity Management: activity encouraged  Medication Review/Management: medications reviewed     Problem: Pain Chronic (Persistent) (Comorbidity Management)  Goal: Acceptable Pain Control and Functional Ability  Outcome: Ongoing, Progressing  Intervention: Manage Persistent Pain  Recent Flowsheet Documentation  Taken 5/23/2024 0800 by Raulito Rubin RN  Sleep/Rest Enhancement:   awakenings minimized   natural light exposure provided   relaxation techniques promoted  Medication Review/Management: medications reviewed  Intervention: Optimize Psychosocial Wellbeing  Recent Flowsheet Documentation  Taken 5/23/2024 0800 by Raulito Rubin RN  Supportive Measures:   active listening utilized   problem-solving facilitated   self-responsibility promoted   verbalization of feelings encouraged  Diversional Activities:   smartphone   television  Spiritual Activities Assistance: affirmation provided  Family/Support System Care:   family care conference arranged   self-care encouraged   support provided     Problem: Seizure Disorder Comorbidity  Goal: Maintenance of Seizure Control  Outcome: Ongoing,  Progressing     Problem: Fall Injury Risk  Goal: Absence of Fall and Fall-Related Injury  Outcome: Ongoing, Progressing  Intervention: Identify and Manage Contributors  Recent Flowsheet Documentation  Taken 5/23/2024 0800 by Raulito Rubin, RN  Medication Review/Management: medications reviewed  Self-Care Promotion:   BADL personal objects within reach   BADL personal routines maintained   safe use of adaptive equipment encouraged  Intervention: Promote Injury-Free Environment  Recent Flowsheet Documentation  Taken 5/23/2024 0800 by Raulito Rubin, RN  Safety Promotion/Fall Prevention:   activity supervised   nonskid shoes/slippers when out of bed   room organization consistent   safety round/check completed   Goal Outcome Evaluation:  Plan of Care Reviewed With: patient        Progress: improving

## 2024-05-23 NOTE — PROGRESS NOTES
"   LOS: 2 days    Patient Care Team:  Hayley Castellanos APRN as PCP - General (Nurse Practitioner)  Octaviano Sampson MD as Consulting Physician (Pulmonary Disease)  Neetu Ashley MD as Referring Physician (Hematology and Oncology)  Nimo Rodríguez MD as Consulting Physician (Radiation Oncology)    Chief Complaint: Hypotension and weakness    Subjective     Interval History:     No acute events overnight. No new complaints   Weak and lethargic       Review of Systems:   No CP or SOA , fever, chills, rigors, rash, N/V, Constipation.       Objective     Vital Sign Min/Max for last 24 hours  Temp  Min: 97.4 °F (36.3 °C)  Max: 99 °F (37.2 °C)   BP  Min: 107/65  Max: 117/62   Pulse  Min: 70  Max: 77   Resp  Min: 16  Max: 18   SpO2  Min: 98 %  Max: 99 %   No data recorded   No data recorded     Flowsheet Rows      Flowsheet Row First Filed Value   Admission Height 182.9 cm (72\") Documented at 05/20/2024 1845   Admission Weight 76.8 kg (169 lb 4.8 oz) Documented at 05/20/2024 1621            I/O this shift:  In: 360 [P.O.:360]  Out: -   I/O last 3 completed shifts:  In: 720 [P.O.:720]  Out: 2950 [Urine:2950]    Physical Exam:    Gen: Alert, NAD   HENT: NC, AT, EOMI   NECK: Supple, no JVD, Trachea midline   LUNGS: CTA bilaterally, non labored respirtation   CVS: S1/S2 audible, RRR, no murmur   Abd: Soft, NT, ND, BS+   Ext: No pedal edema, no cyanosis   CNS: Alert, No focal deficit noted grossly  Psy: Cooperative  Skin: Warm, dry and intact      WBC WBC   Date Value Ref Range Status   05/23/2024 15.69 (H) 3.40 - 10.80 10*3/mm3 Final   05/22/2024 15.36 (H) 3.40 - 10.80 10*3/mm3 Final   05/21/2024 17.18 (H) 3.40 - 10.80 10*3/mm3 Final   05/20/2024 22.57 (H) 3.40 - 10.80 10*3/mm3 Final      HGB Hemoglobin   Date Value Ref Range Status   05/23/2024 9.0 (L) 13.0 - 17.7 g/dL Final   05/22/2024 9.2 (L) 13.0 - 17.7 g/dL Final   05/21/2024 8.9 (L) 13.0 - 17.7 g/dL Final   05/20/2024 9.5 (L) 13.0 - 17.7 g/dL Final      HCT " "Hematocrit   Date Value Ref Range Status   05/23/2024 27.6 (L) 37.5 - 51.0 % Final   05/22/2024 28.1 (L) 37.5 - 51.0 % Final   05/21/2024 28.0 (L) 37.5 - 51.0 % Final   05/20/2024 29.8 (L) 37.5 - 51.0 % Final      Platlets No results found for: \"LABPLAT\"   MCV MCV   Date Value Ref Range Status   05/23/2024 89.6 79.0 - 97.0 fL Final   05/22/2024 93.4 79.0 - 97.0 fL Final   05/21/2024 92.7 79.0 - 97.0 fL Final   05/20/2024 91.7 79.0 - 97.0 fL Final          Sodium Sodium   Date Value Ref Range Status   05/22/2024 133 (L) 136 - 145 mmol/L Final   05/21/2024 134 (L) 136 - 145 mmol/L Final   05/20/2024 133 (L) 136 - 145 mmol/L Final      Potassium Potassium   Date Value Ref Range Status   05/22/2024 4.2 3.5 - 5.2 mmol/L Final     Comment:     Slight hemolysis detected by analyzer. Result may be falsely elevated.   05/21/2024 4.3 3.5 - 5.2 mmol/L Final   05/20/2024 4.4 3.5 - 5.2 mmol/L Final      Chloride Chloride   Date Value Ref Range Status   05/22/2024 102 98 - 107 mmol/L Final   05/21/2024 103 98 - 107 mmol/L Final   05/20/2024 101 98 - 107 mmol/L Final      CO2 CO2   Date Value Ref Range Status   05/22/2024 16.0 (L) 22.0 - 29.0 mmol/L Final   05/21/2024 17.0 (L) 22.0 - 29.0 mmol/L Final   05/20/2024 17.0 (L) 22.0 - 29.0 mmol/L Final      BUN BUN   Date Value Ref Range Status   05/22/2024 33 (H) 8 - 23 mg/dL Final   05/21/2024 37 (H) 8 - 23 mg/dL Final   05/20/2024 36 (H) 8 - 23 mg/dL Final      Creatinine Creatinine   Date Value Ref Range Status   05/22/2024 3.86 (H) 0.76 - 1.27 mg/dL Final   05/21/2024 4.49 (H) 0.76 - 1.27 mg/dL Final   05/20/2024 4.57 (H) 0.76 - 1.27 mg/dL Final      Calcium Calcium   Date Value Ref Range Status   05/22/2024 8.3 (L) 8.6 - 10.5 mg/dL Final   05/21/2024 8.4 (L) 8.6 - 10.5 mg/dL Final   05/20/2024 8.9 8.6 - 10.5 mg/dL Final      PO4 No results found for: \"CAPO4\"   Albumin Albumin   Date Value Ref Range Status   05/22/2024 3.0 (L) 3.5 - 5.2 g/dL Final   05/20/2024 3.4 (L) 3.5 - 5.2 " "g/dL Final      Magnesium No results found for: \"MG\"   Uric Acid No results found for: \"URICACID\"        Results Review:     I reviewed the patient's new clinical results.    budesonide-formoterol, 2 puff, Inhalation, BID - RT   And  tiotropium bromide monohydrate, 2 puff, Inhalation, Daily - RT  famotidine, 20 mg, Oral, Daily  ferrous sulfate, 325 mg, Oral, Daily With Breakfast  heparin (porcine), 5,000 Units, Subcutaneous, Q8H  lactobacillus acidophilus, 1 capsule, Oral, Daily  megestrol, 20 mg, Oral, Daily  pravastatin, 80 mg, Oral, Nightly  sodium bicarbonate, 1,300 mg, Oral, TID  sodium chloride, 10 mL, Intravenous, Q12H  vancomycin, 125 mg, Oral, Q6H      Pharmacy Consult,         Medication Review: yes    Results from last 7 days   Lab Units 05/23/24  0715 05/22/24  0310 05/21/24  0448 05/20/24  1820 05/19/24  0940   SODIUM mmol/L  --  133* 134* 133* 136   POTASSIUM mmol/L  --  4.2 4.3 4.4 4.2   CHLORIDE mmol/L  --  102 103 101 104   CO2 mmol/L  --  16.0* 17.0* 17.0* 19.0*   BUN mg/dL  --  33* 37* 36* 34*   CREATININE mg/dL  --  3.86* 4.49* 4.57* 4.73*   CALCIUM mg/dL  --  8.3* 8.4* 8.9 9.2   ALBUMIN g/dL  --  3.0*  --  3.4* 3.6   WBC 10*3/mm3 15.69* 15.36* 17.18* 22.57* 21.85*   HEMOGLOBIN g/dL 9.0* 9.2* 8.9* 9.5* 10.0*   PLATELETS 10*3/mm3 269 290 288 324 326           Assessment & Plan       Weakness    Malignant neoplasm of lower lobe of right lung    Severe malnutrition    Leukocytosis    CAD (coronary artery disease)    Hypotension    Stage 4 chronic kidney disease    1.  ARACELI on CKD stage IV - Scr 4.49- stable. creatinine peaked around 7.6 to last admission, patient went home with a creatinine slowly coming down to 5 range.  Serologic workup -ANCA (-),  2.  Proteinuria   3.  S/p sepsis resolved patient on last admission was taken off the pressors and was placed on midodrine.  4.  Hyponatremia    5. Anemia   6.  Metabolic acidosis.   7.  Infectious disease: Patient's followed with ID consultants.  8.  " S/p immunotherapy for non-small cell cancer last infusion 4/4/2024 Opdivo.  9- Hypotension - Cortisol level - 15.44 and ACTH pending. level 2 weeks back was 26.33 - Adrenal insufficiency reported with Opdivo in literature along with other endocrine abnormalities. Per wife, he has salt craving and weakness even before Opdivo. ACTH stimulation test result pending , renin viola level pending  10- C diff colitis - on vancomycin.      Recommendation:  -Check labs   - Encourage oral hydration.  - Continue with midodrine as needed   - Sodium bicarb tablets  - Monitor I/O   - Avoid nephrotoxic agents.   - Renal diet   - Adjust meds per renal function   - No emergent need of RRT   - Monitor H/H and transfuse for Hgb less than 7.0      High risk complex patient multiple medical problems.  I discussed the patients findings and my recommendations with patient and nursing staff      Kurt Ríos MD  05/23/24  10:45 EDT

## 2024-05-24 ENCOUNTER — TRANSITIONAL CARE MANAGEMENT TELEPHONE ENCOUNTER (OUTPATIENT)
Dept: CALL CENTER | Facility: HOSPITAL | Age: 74
End: 2024-05-24
Payer: MEDICARE

## 2024-05-24 ENCOUNTER — TELEPHONE (OUTPATIENT)
Dept: INTERNAL MEDICINE | Facility: CLINIC | Age: 74
End: 2024-05-24
Payer: MEDICARE

## 2024-05-24 LAB
ACTH PLAS-MCNC: 54.2 PG/ML (ref 7.2–63.3)
BACTERIA SPEC AEROBE CULT: NORMAL
BACTERIA SPEC AEROBE CULT: NORMAL
DHEA-S SERPL-MCNC: 132 UG/DL (ref 20.8–226.4)
DPYD GENE MUT ANL BLD/T: NEGATIVE
FUNGITELL VALUE: <31.25 PG/ML
GAMMA INTERFERON BACKGROUND BLD IA-ACNC: 0.12 IU/ML
IMP & REVIEW OF LAB RESULTS: NORMAL
Lab: NORMAL
Lab: NORMAL
M TB IFN-G BLD-IMP: NEGATIVE
M TB IFN-G CD4+ BCKGRND COR BLD-ACNC: 0.28 IU/ML
M TB IFN-G CD4+CD8+ BCKGRND COR BLD-ACNC: 0.27 IU/ML
MITOGEN IGNF BCKGRD COR BLD-ACNC: 3.36 IU/ML
PATHOLOGIST NAME: NORMAL
QUANTIFERON INCUBATION: NORMAL
REFERENCE VALUE: NORMAL
SERVICE CMNT-IMP: NORMAL

## 2024-05-24 RX ORDER — MEGESTROL ACETATE 20 MG/1
20 TABLET ORAL DAILY
Qty: 7 TABLET | Refills: 0 | Status: ON HOLD | OUTPATIENT
Start: 2024-05-24 | End: 2024-05-31

## 2024-05-24 NOTE — OUTREACH NOTE
Call Center TCM Note      Flowsheet Row Responses   [facility] patient discharged from? [facility]   Does the patient have one of the following disease processes/diagnoses(primary or secondary)? Other   TCM attempt successful? Yes   Call start time 1043   Call end time 1054   Discharge diagnosis Weakness, hypotension, recurrent treatment for presume sepsis, cdiff positive, ARACELI on CKD stage 4, NSCLC, CAD, chronic anemia , emphysema, severe malnutrition   Is patient permission given to speak with other caregiver? Yes   Person spoke with today (if not patient) and relationship ISAÍAS ANDERSON Friend and patient   Medication alerts for this patient Midodrine 10mg three times a day as needed for systolic BP <100   Meds reviewed with patient/caregiver? Yes  [New sodium bicarb, vancomycin, Midodrine prn.   Stopped: amlodipine, sildenafil, cefepime]   Does the patient have all medications ordered at discharge? Yes   Is the patient taking all medications as directed (includes completed medication regime)? Yes   Medication comments Reports patient was supposed to have megace ordered at discharged but reports that it must have been overlooked. She reports that it helped patients appetite while he was hospitalized. She states Dr Rosales was supposed to order it. Message routed to hospital  and also to PCP office.   Comments PCP Hayley WILLSON. Patient has office visit with PCP on 5/28 and a Hospital follow up appt for 6/3/24  445pm.   Does the patient have an appointment with their PCP within 7-14 days of discharge? Yes   Has home health visited the patient within 72 hours of discharge? N/A   Psychosocial issues? No   Did the patient receive a copy of their discharge instructions? Yes   Nursing interventions Reviewed instructions with patient   What is the patient's perception of their health status since discharge? Improving  [reports has eaten well this am and drinking fluids. Reports that patient has  needed the midodrine twice since discharge.]   Is the patient/caregiver able to teach back signs and symptoms related to disease process for when to call PCP? Yes   Is the patient/caregiver able to teach back signs and symptoms related to disease process for when to call 911? Yes   Is the patient/caregiver able to teach back the hierarchy of who to call/visit for symptoms/problems? PCP, Specialist, Home health nurse, Urgent Care, ED, 911 Yes   If the patient is a current smoker, are they able to teach back resources for cessation? Not a smoker   TCM call completed? Yes   Call end time 1054   Would this patient benefit from a Referral to Amb Social Work? No   Is the patient interested in additional calls from an ambulatory ? No            Berta Marin RN    5/24/2024, 11:02 EDT

## 2024-05-25 ENCOUNTER — HOSPITAL ENCOUNTER (INPATIENT)
Facility: HOSPITAL | Age: 74
LOS: 4 days | Discharge: HOME | DRG: 372 | End: 2024-05-31
Attending: EMERGENCY MEDICINE | Admitting: INTERNAL MEDICINE
Payer: MEDICARE

## 2024-05-25 ENCOUNTER — READMISSION MANAGEMENT (OUTPATIENT)
Dept: CALL CENTER | Facility: HOSPITAL | Age: 74
End: 2024-05-25
Payer: MEDICARE

## 2024-05-25 ENCOUNTER — APPOINTMENT (OUTPATIENT)
Dept: CT IMAGING | Facility: HOSPITAL | Age: 74
DRG: 372 | End: 2024-05-25
Payer: MEDICARE

## 2024-05-25 DIAGNOSIS — A04.72 C. DIFFICILE COLITIS: Primary | ICD-10-CM

## 2024-05-25 DIAGNOSIS — E86.1 HYPOTENSION DUE TO HYPOVOLEMIA: ICD-10-CM

## 2024-05-25 DIAGNOSIS — D72.829 LEUKOCYTOSIS, UNSPECIFIED TYPE: ICD-10-CM

## 2024-05-25 PROBLEM — Z86.79 HX OF HYPOTENSION: Status: ACTIVE | Noted: 2024-05-25

## 2024-05-25 PROBLEM — E86.0 DEHYDRATION: Status: ACTIVE | Noted: 2024-05-25

## 2024-05-25 PROBLEM — N39.0 UTI (URINARY TRACT INFECTION): Status: ACTIVE | Noted: 2024-05-25

## 2024-05-25 PROBLEM — I95.9 HYPOTENSION: Status: ACTIVE | Noted: 2024-05-25

## 2024-05-25 LAB
ALBUMIN SERPL-MCNC: 2.7 G/DL (ref 3.5–5.2)
ALBUMIN/GLOB SERPL: 0.8 G/DL
ALP SERPL-CCNC: 108 U/L (ref 39–117)
ALT SERPL W P-5'-P-CCNC: 22 U/L (ref 1–41)
ANION GAP SERPL CALCULATED.3IONS-SCNC: 17 MMOL/L (ref 5–15)
AST SERPL-CCNC: 25 U/L (ref 1–40)
BACTERIA SPEC AEROBE CULT: NORMAL
BACTERIA UR QL AUTO: ABNORMAL /HPF
BASOPHILS # BLD MANUAL: 0 10*3/MM3 (ref 0–0.2)
BASOPHILS NFR BLD MANUAL: 0 % (ref 0–1.5)
BILIRUB SERPL-MCNC: 0.3 MG/DL (ref 0–1.2)
BILIRUB UR QL STRIP: NEGATIVE
BUN SERPL-MCNC: 28 MG/DL (ref 8–23)
BUN/CREAT SERPL: 8.6 (ref 7–25)
BURR CELLS BLD QL SMEAR: ABNORMAL
CALCIUM SPEC-SCNC: 8.4 MG/DL (ref 8.6–10.5)
CHLORIDE SERPL-SCNC: 105 MMOL/L (ref 98–107)
CK SERPL-CCNC: 9 U/L (ref 20–200)
CLARITY UR: CLEAR
CO2 SERPL-SCNC: 13 MMOL/L (ref 22–29)
COLOR UR: YELLOW
CREAT SERPL-MCNC: 3.27 MG/DL (ref 0.76–1.27)
D-LACTATE SERPL-SCNC: 2.1 MMOL/L (ref 0.5–2)
D-LACTATE SERPL-SCNC: 2.3 MMOL/L (ref 0.5–2)
DEPRECATED RDW RBC AUTO: 54.4 FL (ref 37–54)
EGFRCR SERPLBLD CKD-EPI 2021: 18.9 ML/MIN/1.73
EOSINOPHIL # BLD MANUAL: 0 10*3/MM3 (ref 0–0.4)
EOSINOPHIL NFR BLD MANUAL: 0 % (ref 0.3–6.2)
ERYTHROCYTE [DISTWIDTH] IN BLOOD BY AUTOMATED COUNT: 16.7 % (ref 12.3–15.4)
GLOBULIN UR ELPH-MCNC: 3.6 GM/DL
GLUCOSE SERPL-MCNC: 102 MG/DL (ref 65–99)
GLUCOSE UR STRIP-MCNC: ABNORMAL MG/DL
HCT VFR BLD AUTO: 31.6 % (ref 37.5–51)
HGB BLD-MCNC: 10.3 G/DL (ref 13–17.7)
HGB UR QL STRIP.AUTO: ABNORMAL
HYALINE CASTS UR QL AUTO: ABNORMAL /LPF
KETONES UR QL STRIP: NEGATIVE
LEUKOCYTE ESTERASE UR QL STRIP.AUTO: ABNORMAL
LYMPHOCYTES # BLD MANUAL: 1.24 10*3/MM3 (ref 0.7–3.1)
LYMPHOCYTES NFR BLD MANUAL: 2 % (ref 5–12)
MCH RBC QN AUTO: 28.9 PG (ref 26.6–33)
MCHC RBC AUTO-ENTMCNC: 32.6 G/DL (ref 31.5–35.7)
MCV RBC AUTO: 88.5 FL (ref 79–97)
METAMYELOCYTES NFR BLD MANUAL: 2 % (ref 0–0)
MONOCYTES # BLD: 0.62 10*3/MM3 (ref 0.1–0.9)
NEUTROPHILS # BLD AUTO: 28.51 10*3/MM3 (ref 1.7–7)
NEUTROPHILS NFR BLD MANUAL: 83 % (ref 42.7–76)
NEUTS BAND NFR BLD MANUAL: 9 % (ref 0–5)
NITRITE UR QL STRIP: NEGATIVE
NRBC SPEC MANUAL: 0 /100 WBC (ref 0–0.2)
PH UR STRIP.AUTO: 7 [PH] (ref 5–8)
PLAT MORPH BLD: NORMAL
PLATELET # BLD AUTO: 294 10*3/MM3 (ref 140–450)
PMV BLD AUTO: 8.7 FL (ref 6–12)
POTASSIUM SERPL-SCNC: 3.9 MMOL/L (ref 3.5–5.2)
PROT SERPL-MCNC: 6.3 G/DL (ref 6–8.5)
PROT UR QL STRIP: ABNORMAL
RBC # BLD AUTO: 3.57 10*6/MM3 (ref 4.14–5.8)
RBC # UR STRIP: ABNORMAL /HPF
REF LAB TEST METHOD: ABNORMAL
RENAL EPI CELLS #/AREA URNS HPF: ABNORMAL /HPF
SODIUM SERPL-SCNC: 135 MMOL/L (ref 136–145)
SP GR UR STRIP: 1.01 (ref 1–1.03)
SQUAMOUS #/AREA URNS HPF: ABNORMAL /HPF
TROPONIN T SERPL HS-MCNC: 31 NG/L
UROBILINOGEN UR QL STRIP: ABNORMAL
VARIANT LYMPHS NFR BLD MANUAL: 2 % (ref 0–5)
VARIANT LYMPHS NFR BLD MANUAL: 2 % (ref 19.6–45.3)
WBC # UR STRIP: ABNORMAL /HPF
WBC MORPH BLD: NORMAL
WBC NRBC COR # BLD AUTO: 30.99 10*3/MM3 (ref 3.4–10.8)

## 2024-05-25 PROCEDURE — 87040 BLOOD CULTURE FOR BACTERIA: CPT | Performed by: EMERGENCY MEDICINE

## 2024-05-25 PROCEDURE — 81001 URINALYSIS AUTO W/SCOPE: CPT | Performed by: EMERGENCY MEDICINE

## 2024-05-25 PROCEDURE — 74176 CT ABD & PELVIS W/O CONTRAST: CPT

## 2024-05-25 PROCEDURE — 82550 ASSAY OF CK (CPK): CPT | Performed by: EMERGENCY MEDICINE

## 2024-05-25 PROCEDURE — 99291 CRITICAL CARE FIRST HOUR: CPT

## 2024-05-25 PROCEDURE — 25810000003 LACTATED RINGERS SOLUTION: Performed by: EMERGENCY MEDICINE

## 2024-05-25 PROCEDURE — 94664 DEMO&/EVAL PT USE INHALER: CPT

## 2024-05-25 PROCEDURE — 99223 1ST HOSP IP/OBS HIGH 75: CPT | Performed by: INTERNAL MEDICINE

## 2024-05-25 PROCEDURE — 85025 COMPLETE CBC W/AUTO DIFF WBC: CPT | Performed by: EMERGENCY MEDICINE

## 2024-05-25 PROCEDURE — 25810000003 LACTATED RINGERS PER 1000 ML: Performed by: NURSE PRACTITIONER

## 2024-05-25 PROCEDURE — G0378 HOSPITAL OBSERVATION PER HR: HCPCS

## 2024-05-25 PROCEDURE — 87086 URINE CULTURE/COLONY COUNT: CPT | Performed by: PHYSICIAN ASSISTANT

## 2024-05-25 PROCEDURE — 83605 ASSAY OF LACTIC ACID: CPT | Performed by: EMERGENCY MEDICINE

## 2024-05-25 PROCEDURE — 80053 COMPREHEN METABOLIC PANEL: CPT | Performed by: EMERGENCY MEDICINE

## 2024-05-25 PROCEDURE — 84484 ASSAY OF TROPONIN QUANT: CPT | Performed by: EMERGENCY MEDICINE

## 2024-05-25 PROCEDURE — 36415 COLL VENOUS BLD VENIPUNCTURE: CPT

## 2024-05-25 PROCEDURE — 93005 ELECTROCARDIOGRAM TRACING: CPT | Performed by: EMERGENCY MEDICINE

## 2024-05-25 PROCEDURE — 94640 AIRWAY INHALATION TREATMENT: CPT

## 2024-05-25 PROCEDURE — 85007 BL SMEAR W/DIFF WBC COUNT: CPT | Performed by: EMERGENCY MEDICINE

## 2024-05-25 RX ORDER — BUDESONIDE AND FORMOTEROL FUMARATE DIHYDRATE 160; 4.5 UG/1; UG/1
2 AEROSOL RESPIRATORY (INHALATION)
Status: DISCONTINUED | OUTPATIENT
Start: 2024-05-25 | End: 2024-05-31 | Stop reason: HOSPADM

## 2024-05-25 RX ORDER — MIDODRINE HYDROCHLORIDE 10 MG/1
10 TABLET ORAL 3 TIMES DAILY PRN
Status: DISCONTINUED | OUTPATIENT
Start: 2024-05-25 | End: 2024-05-31 | Stop reason: HOSPADM

## 2024-05-25 RX ORDER — NITROGLYCERIN 0.4 MG/1
0.4 TABLET SUBLINGUAL
Status: DISCONTINUED | OUTPATIENT
Start: 2024-05-25 | End: 2024-05-31 | Stop reason: HOSPADM

## 2024-05-25 RX ORDER — ACETAMINOPHEN 650 MG/1
650 SUPPOSITORY RECTAL EVERY 4 HOURS PRN
Status: DISCONTINUED | OUTPATIENT
Start: 2024-05-25 | End: 2024-05-31 | Stop reason: HOSPADM

## 2024-05-25 RX ORDER — MIDODRINE HYDROCHLORIDE 5 MG/1
10 TABLET ORAL ONCE
Status: COMPLETED | OUTPATIENT
Start: 2024-05-25 | End: 2024-05-25

## 2024-05-25 RX ORDER — VANCOMYCIN HYDROCHLORIDE 125 MG/1
125 CAPSULE ORAL EVERY 6 HOURS SCHEDULED
Status: DISCONTINUED | OUTPATIENT
Start: 2024-05-25 | End: 2024-05-31 | Stop reason: HOSPADM

## 2024-05-25 RX ORDER — SODIUM BICARBONATE 650 MG/1
1300 TABLET ORAL 3 TIMES DAILY
Status: DISCONTINUED | OUTPATIENT
Start: 2024-05-25 | End: 2024-05-31 | Stop reason: HOSPADM

## 2024-05-25 RX ORDER — ACETAMINOPHEN 325 MG/1
650 TABLET ORAL EVERY 4 HOURS PRN
Status: DISCONTINUED | OUTPATIENT
Start: 2024-05-25 | End: 2024-05-31 | Stop reason: HOSPADM

## 2024-05-25 RX ORDER — ACETAMINOPHEN 160 MG/5ML
650 SOLUTION ORAL EVERY 4 HOURS PRN
Status: DISCONTINUED | OUTPATIENT
Start: 2024-05-25 | End: 2024-05-31 | Stop reason: HOSPADM

## 2024-05-25 RX ORDER — ALBUTEROL SULFATE 90 UG/1
2 AEROSOL, METERED RESPIRATORY (INHALATION) EVERY 4 HOURS PRN
Status: DISCONTINUED | OUTPATIENT
Start: 2024-05-25 | End: 2024-05-31 | Stop reason: HOSPADM

## 2024-05-25 RX ORDER — PRAVASTATIN SODIUM 40 MG
80 TABLET ORAL NIGHTLY
Status: DISCONTINUED | OUTPATIENT
Start: 2024-05-25 | End: 2024-05-31 | Stop reason: HOSPADM

## 2024-05-25 RX ORDER — SODIUM CHLORIDE, SODIUM LACTATE, POTASSIUM CHLORIDE, CALCIUM CHLORIDE 600; 310; 30; 20 MG/100ML; MG/100ML; MG/100ML; MG/100ML
100 INJECTION, SOLUTION INTRAVENOUS CONTINUOUS
Status: ACTIVE | OUTPATIENT
Start: 2024-05-25 | End: 2024-05-25

## 2024-05-25 RX ORDER — FERROUS SULFATE 325(65) MG
325 TABLET ORAL
Status: DISCONTINUED | OUTPATIENT
Start: 2024-05-25 | End: 2024-05-31 | Stop reason: HOSPADM

## 2024-05-25 RX ORDER — MEGESTROL ACETATE 20 MG/1
20 TABLET ORAL DAILY
Status: DISCONTINUED | OUTPATIENT
Start: 2024-05-25 | End: 2024-05-31 | Stop reason: HOSPADM

## 2024-05-25 RX ADMIN — MEGESTROL ACETATE 20 MG: 20 TABLET ORAL at 12:06

## 2024-05-25 RX ADMIN — BUDESONIDE AND FORMOTEROL FUMARATE DIHYDRATE 2 PUFF: 160; 4.5 AEROSOL RESPIRATORY (INHALATION) at 20:12

## 2024-05-25 RX ADMIN — MIDODRINE HYDROCHLORIDE 10 MG: 10 TABLET ORAL at 10:35

## 2024-05-25 RX ADMIN — FERROUS SULFATE TAB 325 MG (65 MG ELEMENTAL FE) 325 MG: 325 (65 FE) TAB at 09:58

## 2024-05-25 RX ADMIN — SODIUM BICARBONATE 650 MG TABLET 1300 MG: at 17:00

## 2024-05-25 RX ADMIN — VANCOMYCIN HYDROCHLORIDE 125 MG: 125 CAPSULE ORAL at 12:06

## 2024-05-25 RX ADMIN — MIDODRINE HYDROCHLORIDE 10 MG: 5 TABLET ORAL at 04:39

## 2024-05-25 RX ADMIN — PRAVASTATIN SODIUM 80 MG: 40 TABLET ORAL at 20:39

## 2024-05-25 RX ADMIN — SODIUM CHLORIDE, POTASSIUM CHLORIDE, SODIUM LACTATE AND CALCIUM CHLORIDE 1000 ML: 600; 310; 30; 20 INJECTION, SOLUTION INTRAVENOUS at 03:52

## 2024-05-25 RX ADMIN — VANCOMYCIN HYDROCHLORIDE 125 MG: 125 CAPSULE ORAL at 17:00

## 2024-05-25 RX ADMIN — SODIUM BICARBONATE 650 MG TABLET 1300 MG: at 09:58

## 2024-05-25 RX ADMIN — SODIUM CHLORIDE, POTASSIUM CHLORIDE, SODIUM LACTATE AND CALCIUM CHLORIDE 100 ML/HR: 600; 310; 30; 20 INJECTION, SOLUTION INTRAVENOUS at 09:58

## 2024-05-25 RX ADMIN — SODIUM BICARBONATE 650 MG TABLET 1300 MG: at 20:39

## 2024-05-25 NOTE — CONSULTS
INFECTIOUS DISEASE CONSULT/INITIAL HOSPITAL VISIT    David Barfield  1950  [id number]    Date of Consult: 5/25/2024    Admission Date: 5/25/2024      Requesting Provider: ANAMIKA Sr  Evaluating Physician: David Mistry MD    Reason for Consultation: Cdiff    History of present illness:    Patient is a 74 y.o. male , known to Dr. Derian Barry, with h/o COPD, NSCLC right lung/chemo/RLL lobectomy 1/2024/on Opdivo with last dose around 4/4/2024, T2DM, CAD, AV block/ppm, HTN, HLD, tobacco use, and CKD Stage V/not on dialysis who presented to [facility] ED on 5/25 with hypotension.  He has had multiple admissions over the last month. He had severe leukocytosis and hospitalized from 4/24 to 4/29.  He was started on Cefepime.  A Karius test from 4/26 was negative. He also had persistent diarrhea; however, his Cdiff and GI panel PCR tests were negative from 4/25.  He was admitted from 5/6-5/12 for bilateral pyelonephritis and continued on IV Cefepime with end date tentative for 6/3.  He was admitted to [facility] from 5/20-5/23 for acute renal failure and found to have Cdiff positive PCR and Antigen test.  Cefepime was stopped and he was discharged on oral Vancomycin for 2 weeks.      He was readmitted to [facility] on 5/25 for increased weakness along with nausea, vomiting, and decreased appetite.  He had hypotension at home that did not improve with midodrine.  He was brought to ED by EMS.  He denies fever or chills or worsening diarrhea.  He states that he has had no diarrhea since a couple of days ago.  He has had some dysuria and increased urinary frequency.  He is afebrile.  Pertinent labs were lactic acid 2.3, WBC 31,000 with 83% segs/9% bands, CPK 9, and creatinine 3.27 (baseline 2.03 to 2.4 and last creatinine on 3/22 was 3.86 after acute renal failure episode).  Blood cultures are pending.  A UA WBC was 21-50 with moderate LE and negative nitrite.  No imaging studies have been done on this visit.  He is  currently on oral Vancomycin.  ID was asked to evaluate and manage his antibiotic therapy.     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    Coronary artery disease 2019    Diabetes mellitus Borderline    Emphysema/COPD     Erectile disorder     GERD (gastroesophageal reflux disease)     History of chemotherapy     Hyperlipidemia     Hypertension 2019    Lung nodule     Mumps     Mumps     Pruritus     after bath    Slow to wake up after anesthesia     Stage 5 chronic kidney disease not on chronic dialysis 5/14/2024    Wears dentures     upper only    Wears hearing aid in both ears     usually only wears right       Past Surgical History:   Procedure Laterality Date    BONE BIOPSY      broken bone surgery in his face    BRONCHOSCOPY THORACOTOMY Right 01/09/2024    Procedure: THORACOTOMY FOR LOWER LOBECTOMY AND MEDISTINAL LYMPH NODE DISSECTION RIGHT;  Surgeon: Joey Patel MD;  Location: [facility] OR;  Service: Cardiothoracic;  Laterality: Right;    BRONCHOSCOPY WITH ION ROBOTIC ASSIST N/A 09/15/2023    Procedure: BRONCHOSCOPY NAVIGATION WITH ENDOBRONCHIAL ULTRASOUND AND ION ROBOT;  Surgeon: Octaviano Sampson MD;  Location:  [facility];  Service: Robotics - Pulmonary;  Laterality: N/A;  ion #6 - 0032  - 0015  Cath guide 0061    EBUS balloon removed and intact    CARDIAC ELECTROPHYSIOLOGY PROCEDURE N/A 08/17/2021    Procedure: Pacemaker DC new;  Surgeon: Kayy Box MD;  Location:  [facility];  Service: Cardiology;  Laterality: N/A;    FACIAL FRACTURE SURGERY      LYMPH NODE BIOPSY  2023    PACEMAKER IMPLANTATION         Family History   Problem Relation Age of Onset    Aneurysm Mother         brain    Dementia Father     Leukemia Sister     Heart disease Paternal Grandmother     Hypertension Paternal Grandfather     Cancer Sister        Social History  "    Socioeconomic History    Marital status: Single    Number of children: 3   Tobacco Use    Smoking status: Every Day     Current packs/day: 0.50     Average packs/day: 0.5 packs/day for 56.4 years (28.7 ttl pk-yrs)     Types: Cigarettes     Start date: 1/1/1968    Smokeless tobacco: Never    Tobacco comments:     Still smoke   Vaping Use    Vaping status: Never Used   Substance and Sexual Activity    Alcohol use: Never    Drug use: Never    Sexual activity: Yes     Partners: Female     Birth control/protection: None       Allergies   Allergen Reactions    Cymbalta [Duloxetine Hcl] GI Intolerance    Gabapentin Mental Status Change     Pt states that this medication \"makes him feel foolish in his head\".     Remeron [Mirtazapine] Other (See Comments)     Excess sedation    Toradol [Ketorolac Tromethamine] GI Intolerance     Projectile vomiting     Latex Other (See Comments)     Latex allergy     Tape Rash         Medication:    Current Facility-Administered Medications:     acetaminophen (TYLENOL) tablet 650 mg, 650 mg, Oral, Q4H PRN **OR** acetaminophen (TYLENOL) 160 MG/5ML oral solution 650 mg, 650 mg, Oral, Q4H PRN **OR** acetaminophen (TYLENOL) suppository 650 mg, 650 mg, Rectal, Q4H PRN, Bryson, Marisabel, APRN    albuterol sulfate HFA (PROVENTIL HFA;VENTOLIN HFA;PROAIR HFA) inhaler 2 puff, 2 puff, Inhalation, Q4H PRN, Bryson, Marisabel, APRN    budesonide-formoterol (SYMBICORT) 160-4.5 MCG/ACT inhaler 2 puff, 2 puff, Inhalation, BID - RT **AND** tiotropium (SPIRIVA RESPIMAT) 2.5 mcg/act aerosol solution inhaler, 2 puff, Inhalation, Daily - RT, Bryson, Marisabel, APRN    ferrous sulfate tablet 325 mg, 325 mg, Oral, Daily With Breakfast, Bryson, Marisabel, APRN, 325 mg at 05/25/24 0958    lactated ringers infusion, 100 mL/hr, Intravenous, Continuous, Bryson, Marisabel, APRN, Last Rate: 100 mL/hr at 05/25/24 0958, 100 mL/hr at 05/25/24 0958    megestrol (MEGACE) tablet 20 mg, 20 mg, Oral, Daily, Marisabel Massey, " APRN    midodrine (PROAMATINE) tablet 10 mg, 10 mg, Oral, TID PRN, Bryson, Marisabel, APRN, 10 mg at 24 1035    nitroglycerin (NITROSTAT) SL tablet 0.4 mg, 0.4 mg, Sublingual, Q5 Min PRN, Bryson, Marisabel, APRN    pravastatin (PRAVACHOL) tablet 80 mg, 80 mg, Oral, Nightly, Bryson, Marisabel, APRN    sodium bicarbonate tablet 1,300 mg, 1,300 mg, Oral, TID, Bryson, Marisabel, APRN, 1,300 mg at 24 0958    vancomycin (VANCOCIN) capsule 125 mg, 125 mg, Oral, Q6H, Bryson, Marisabel, APRN    Antibiotics:  Anti-Infectives (From admission, onward)      Ordered     Dose/Rate Route Frequency Start Stop    24 0908  vancomycin (VANCOCIN) capsule 125 mg        Ordering Provider: Bryson, Marisabel, APRN    125 mg Oral Every 6 Hours Scheduled 24 1200 24 1159              Review of Systems:  Constitutional-- No Fever, chills or sweats.  Appetite good, and no malaise. No fatigue.  HEENT-- No new vision, hearing or throat complaints.  No epistaxis or oral sores.  Denies odynophagia or dysphagia. No headache, photophobia or neck stiffness.  CV-- No chest pain, palpitation or syncope  Resp-- No SOB/cough/Hemoptysis  GI- No nausea, vomiting, or diarrhea.  No hematochezia, melena, or hematemesis. Denies jaundice or chronic liver disease.  -- No dysuria, hematuria, or flank pain.  Denies hesitancy, urgency or flank pain.  Lymph- no swollen lymph nodes in neck/axilla or groin.   Heme- No active bruising or bleeding; no Hx of DVT or PE.  MS-- no swelling or pain in the bones or joints of arms/legs.  No new back pain.  Neuro-- No acute focal weakness or numbness in the arms or legs.  No seizures.  Skin--No rashes or lesions      Physical Exam:   Vital Signs  Temp (24hrs), Av.8 °F (36.6 °C), Min:97.7 °F (36.5 °C), Max:97.9 °F (36.6 °C)    Temp  Min: 97.7 °F (36.5 °C)  Max: 97.9 °F (36.6 °C)  BP  Min: 80/60  Max: 116/74  Pulse  Min: 70  Max: 94  Resp  Min: 18  Max: 19  SpO2  Min: 95 %  Max: 98 %    GENERAL: Awake  and alert, in no acute distress.   HEENT: Normocephalic, atraumatic.  PERRL. EOMI. No conjunctival injection. No icterus. Oropharynx clear without evidence of thrush or exudate. No evidence of periodontal disease.    NECK: Supple without nuchal rigidity. No mass.  LYMPH: No cervical, axillary or inguinal lymphadenopathy.  HEART: RRR; No murmur, rubs, gallops.   LUNGS: Clear to auscultation bilaterally without wheezing, rales, rhonchi. Normal respiratory effort. Nonlabored. No dullness.  ABDOMEN: Soft, nontender, nondistended. Positive bowel sounds. No rebound or guarding. NO mass or HSM.  EXT:  No cyanosis, clubbing or edema. No cord.  :  Without Yeung catheter.  MSK: No joint effusions or erythema  SKIN: Warm and dry without cutaneous eruptions on Inspection/palpation.    NEURO: Oriented to PPT.  Motor 5/5 strength  PSYCHIATRIC: Normal insight and judgment. Cooperative with PE    Laboratory Data    Results from last 7 days   Lab Units 05/25/24  0343 05/23/24  0715 05/22/24  0310   WBC 10*3/mm3 30.99* 15.69* 15.36*   HEMOGLOBIN g/dL 10.3* 9.0* 9.2*   HEMATOCRIT % 31.6* 27.6* 28.1*   PLATELETS 10*3/mm3 294 269 290     Results from last 7 days   Lab Units 05/25/24  0343   SODIUM mmol/L 135*   POTASSIUM mmol/L 3.9   CHLORIDE mmol/L 105   CO2 mmol/L 13.0*   BUN mg/dL 28*   CREATININE mg/dL 3.27*   GLUCOSE mg/dL 102*   CALCIUM mg/dL 8.4*     Results from last 7 days   Lab Units 05/25/24  0343   ALK PHOS U/L 108   BILIRUBIN mg/dL 0.3   ALT (SGPT) U/L 22   AST (SGOT) U/L 25             Results from last 7 days   Lab Units 05/25/24  0718   LACTATE mmol/L 2.1*     Results from last 7 days   Lab Units 05/25/24  0343   CK TOTAL U/L 9*         Estimated Creatinine Clearance: 21.5 mL/min (A) (by C-G formula based on SCr of 3.27 mg/dL (H)).      Microbiology:  Blood cultures pending.   Urine culture pending            Radiology:  Imaging Results (Last 72 Hours)       ** No results found for the last 72 hours. **               Impression:   - Sepsis manifested by hypotension, lactic acidosis, and leukocytosis likely secondary to Cdiff colitis and recurrent UTI.  - Pyuria/culture pending  - Recent bilateral pyelonephritis/treated with Cefepime  - Recent Cdiff colitis with diarrhea  - Marked leukocytosis/neutrophilia  - Lactic acidosis  - Hypotension/responding to fluid resuscitation  - Acute on chronic renal failure Stage V/no dialysis.  Improving from last couple of hospitalizations.  Baseline around 2.0-2.4.  - Non small cell lung cancer s/p RLL lobectomy/s/p chemotherapy  - Chronic obstructive pulmonary disease with ongoing tobacco abuse  - Type 2 diabetes mellitus  - AV block/ppm  - Essential hypertension      PLAN/RECOMMENDATIONS:   Thank you for asking us to see David Barfield, I recommend the following:  - Follow blood cultures.  - Asked micro to do urine culture on urine collected earlier  - Continue oral Vancomycin for now  - Await further treatment pending further culture results.    Dr. Derian Barry to resume care on Tuesday    David Mistry MD saw and examined patient, verified hx and PE, reviewed labs and micro data, and formulated dx, plan for treatment and all medical decision making.      MICHAEL GranadosC for David Mistry MD    Unclear cause of hypotension and leukocytosis    Certainly if CT scan shows worsening colitis we may add an IV treatment such as eravacycline    Continue oral vancomycin presently    If patient needs to be on stress dose steroids for adrenal insufficiency that may be beneficial for hypotension.      I have seen and examined patient agree with above    THIEN Granados  5/25/2024  10:56 EDT

## 2024-05-25 NOTE — ED NOTES
David Barfield    Nursing Report ED to Floor:  Mental status: A/o x4  Ambulatory status: assist x1  Oxygen Therapy:  RA  Cardiac Rhythm: Accelerated Junctional rhythm with occasional ventricular-paced complexes   Admitted from: home  Safety Concerns: falls  Social Issues: partner does not get along with pts other family members  ED Room #:  20    ED Nurse Phone Extension - [phone] or may call [phone].      HPI:   Chief Complaint   Patient presents with    Hypotension    Vomiting    Nausea       Past Medical History:  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    Coronary artery disease 2019    Diabetes mellitus Borderline    Emphysema/COPD     Erectile disorder     GERD (gastroesophageal reflux disease)     History of chemotherapy     Hyperlipidemia     Hypertension 2019    Lung nodule     Mumps     Mumps     Pruritus     after bath    Slow to wake up after anesthesia     Stage 5 chronic kidney disease not on chronic dialysis 5/14/2024    Wears dentures     upper only    Wears hearing aid in both ears     usually only wears right        Past Surgical History:  Past Surgical History:   Procedure Laterality Date    BONE BIOPSY      broken bone surgery in his face    BRONCHOSCOPY THORACOTOMY Right 01/09/2024    Procedure: THORACOTOMY FOR LOWER LOBECTOMY AND MEDISTINAL LYMPH NODE DISSECTION RIGHT;  Surgeon: Joey Patel MD;  Location: [facility] OR;  Service: Cardiothoracic;  Laterality: Right;    BRONCHOSCOPY WITH ION ROBOTIC ASSIST N/A 09/15/2023    Procedure: BRONCHOSCOPY NAVIGATION WITH ENDOBRONCHIAL ULTRASOUND AND ION ROBOT;  Surgeon: Octaviano Sampson MD;  Location: [facility];  Service: Robotics - Pulmonary;  Laterality: N/A;  ion #6 - 0032  - 0015  Cath guide 0061    EBUS balloon removed and intact    CARDIAC ELECTROPHYSIOLOGY PROCEDURE N/A 08/17/2021    Procedure: Pacemaker DC new;   Surgeon: Kayy Box MD;  Location: [facility] INVASIVE LOCATION;  Service: Cardiology;  Laterality: N/A;    FACIAL FRACTURE SURGERY      LYMPH NODE BIOPSY  2023    PACEMAKER IMPLANTATION          Admitting Doctor:   Ash Ann III, DO    Consulting Provider(s):  Consults       Date and Time Order Name Status Description    5/21/2024 10:29 AM Inpatient Cardiology Consult Completed     5/20/2024  4:27 PM Inpatient Nephrology Consult Completed     5/20/2024  4:27 PM Inpatient Hematology & Oncology Consult Completed     5/20/2024  4:26 PM Inpatient Infectious Diseases Consult Completed     5/15/2024 11:48 AM Inpatient Nephrology Consult Completed     5/6/2024 12:10 PM Inpatient Nephrology Consult Completed     5/6/2024  3:25 AM Inpatient Infectious Diseases Consult Completed     4/25/2024  2:19 PM Inpatient Hematology & Oncology Consult Completed     4/25/2024  9:52 AM Inpatient Nephrology Consult Completed     4/24/2024  3:19 PM Inpatient Infectious Diseases Consult Completed              Admitting Diagnosis:   The primary encounter diagnosis was C. difficile colitis. Diagnoses of Leukocytosis, unspecified type and Hypotension due to hypovolemia were also pertinent to this visit.    Most Recent Vitals:   Vitals:    05/25/24 0400 05/25/24 0500 05/25/24 0505 05/25/24 0530   BP: (!) 80/60 104/74  112/64   Pulse: 91 80 80 70   Resp:       Temp:       TempSrc:       SpO2: 97% 96% 98% 96%   Weight:       Height:           Active LDAs/IV Access:   Lines, Drains & Airways       Active LDAs       Name Placement date Placement time Site Days    Peripheral IV 05/25/24 Anterior;Left;Upper Arm 05/25/24  --  Arm  less than 1                    Labs (abnormal labs have a star):   Labs Reviewed   COMPREHENSIVE METABOLIC PANEL - Abnormal; Notable for the following components:       Result Value    Glucose 102 (*)     BUN 28 (*)     Creatinine 3.27 (*)     Sodium 135 (*)     CO2 13.0 (*)     Calcium 8.4 (*)      Albumin 2.7 (*)     Anion Gap 17.0 (*)     eGFR 18.9 (*)     All other components within normal limits    Narrative:     GFR Normal >60  Chronic Kidney Disease <60  Kidney Failure <15    The GFR formula is only valid for adults with stable renal function between ages 18 and 70.   URINALYSIS W/ MICROSCOPIC IF INDICATED (NO CULTURE) - Abnormal; Notable for the following components:    Glucose,  mg/dL (Trace) (*)     Blood, UA Trace (*)     Protein,  mg/dL (2+) (*)     Leuk Esterase, UA Moderate (2+) (*)     All other components within normal limits   SINGLE HS TROPONIN T - Abnormal; Notable for the following components:    HS Troponin T 31 (*)     All other components within normal limits    Narrative:     High Sensitive Troponin T Reference Range:  <14.0 ng/L- Negative Female for AMI  <22.0 ng/L- Negative Male for AMI  >=14 - Abnormal Female indicating possible myocardial injury.  >=22 - Abnormal Male indicating possible myocardial injury.   Clinicians would have to utilize clinical acumen, EKG, Troponin, and serial changes to determine if it is an Acute Myocardial Infarction or myocardial injury due to an underlying chronic condition.        LACTIC ACID, PLASMA - Abnormal; Notable for the following components:    Lactate 2.3 (*)     All other components within normal limits   CK - Abnormal; Notable for the following components:    Creatine Kinase 9 (*)     All other components within normal limits   CBC WITH AUTO DIFFERENTIAL - Abnormal; Notable for the following components:    WBC 30.99 (*)     RBC 3.57 (*)     Hemoglobin 10.3 (*)     Hematocrit 31.6 (*)     RDW 16.7 (*)     RDW-SD 54.4 (*)     All other components within normal limits    Narrative:     The previously reported component NRBC is no longer being reported. Previous result was 0.0 /100 WBC (Reference Range: 0.0-0.2 /100 WBC) on 5/25/2024 at 0409 EDT.   URINALYSIS, MICROSCOPIC ONLY - Abnormal; Notable for the following components:    WBC, UA  21-50 (*)     Bacteria, UA 1+ (*)     Squamous Epithelial Cells, UA 3-6 (*)     All other components within normal limits   MANUAL DIFFERENTIAL - Abnormal; Notable for the following components:    Neutrophil % 83.0 (*)     Lymphocyte % 2.0 (*)     Monocyte % 2.0 (*)     Eosinophil % 0.0 (*)     Bands %  9.0 (*)     Metamyelocyte % 2.0 (*)     Neutrophils Absolute 28.51 (*)     All other components within normal limits   BLOOD CULTURE   BLOOD CULTURE   LACTIC ACID, REFLEX   CBC AND DIFFERENTIAL    Narrative:     The following orders were created for panel order CBC & Differential.  Procedure                               Abnormality         Status                     ---------                               -----------         ------                     CBC Auto Differential[[id number]]        Abnormal            Final result               Scan Slide[[id number]]                                                                    Please view results for these tests on the individual orders.       Meds Given in ED:   Medications   lactated ringers bolus 1,000 mL (1,000 mL Intravenous New Bag 5/25/24 3150)   midodrine (PROAMATINE) tablet 10 mg (10 mg Oral Given 5/25/24 1187)     No current facility-administered medications for this encounter.       Last NIH score:                                                          Dysphagia screening results:        Animas Coma Scale:  No data recorded     CIWA:        Restraint Type:            Isolation Status:  No active isolations

## 2024-05-25 NOTE — H&P
[facility] Medicine Services  HISTORY AND PHYSICAL    Patient Name: David Barfield  : 1950  MRN: [id number]  Primary Care Physician: Hayley Castellanos APRN  Date of admission: 2024    Subjective   Subjective     Chief Complaint:  Low BP     HPI:  David Barfield is a 74 y.o. male past medical history significant for NSCLC status post neoadjuvant chemo, RLL lobectomy 2024, subsequent immunotherapy with Opdivo (last dose around 2024), CAD, essential hypertension, AV block status post PPM, hypertension, emphysema, tobacco use and CKD 4/5 presents to the ED accompanied by wife due to low BP.  Patient has had approximately 4-5 admissions since last month.  He was recently discharged on 2024.  He was admitted at that time due to hypotension that resolved with IV fluids.  Additionally patient did test positive for C. difficile and started on oral vancomycin.  Patient reports since being home he has had increased weakness along with nausea, vomiting and poor appetite.  Systolic blood pressure tonight per wife was in the 50s and 60s.  She reports usually his blood pressure will improve with the midodrine and when patient sits down however his blood pressure did not and EMS was called.  Patient denies any known fever, chills, cough, worsening shortness of breath, diarrhea, chest pain or headaches.  He does report dysuria and urinary frequency.  His case was discussed with infectious disease by ED provider whom recommended continuation of oral vancomycin and to hold on further IV antibiotics at this time.  Patient will be admitted to [facility] under the care of the hospitalist for further evaluation and treatment.        Review of Systems   Constitutional:  Positive for activity change and appetite change. Negative for chills, diaphoresis, fatigue and fever.   HENT: Negative.     Eyes:  Negative for photophobia and visual disturbance.   Respiratory:   Negative for cough and shortness of breath.    Cardiovascular:  Negative for chest pain, palpitations and leg swelling.   Gastrointestinal:  Positive for abdominal pain, nausea and vomiting. Negative for abdominal distention, blood in stool, constipation and diarrhea.   Genitourinary:  Positive for dysuria and frequency. Negative for difficulty urinating.   Musculoskeletal:  Negative for back pain, neck pain and neck stiffness.   Skin: Negative.    Neurological:  Positive for weakness. Negative for dizziness, speech difficulty, light-headedness and headaches.   Psychiatric/Behavioral: Negative.                  Personal History     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    Coronary artery disease 2019    Diabetes mellitus Borderline    Emphysema/COPD     Erectile disorder     GERD (gastroesophageal reflux disease)     History of chemotherapy     Hyperlipidemia     Hypertension 2019    Lung nodule     Mumps     Mumps     Pruritus     after bath    Slow to wake up after anesthesia     Stage 5 chronic kidney disease not on chronic dialysis 5/14/2024    Wears dentures     upper only    Wears hearing aid in both ears     usually only wears right         Oncology Problem List:  Lung cancer (01/09/2024; Status: Resolved)  Bronchogenic cancer of right lung (10/04/2023; Status: Active)  Malignant neoplasm of lower lobe of right lung (10/04/2023; Status:   Active)  Oncology/Hematology History   Malignant neoplasm of lower lobe of right lung   10/4/2023 Initial Diagnosis    Malignant neoplasm of lower lobe of right lung     10/4/2023 Cancer Staged    Staging form: Lung, AJCC 8th Edition  - Clinical: Stage IIIA (cT2b, cN2, cM0) - Signed by Neetu Ashley MD on 10/4/2023     10/23/2023 - 12/5/2023 Chemotherapy    OP LUNG  Nivolumab 360mg /  PACLitaxel / CARBOplatin AUC=5      10/23/2023 -  Chemotherapy    OP  CENTRAL VENOUS ACCESS DEVICE Access, Care, and Maintenance (CVAD)     2/6/2024 - 2/27/2024 Chemotherapy    OP LUNG Atezolizumab         Past Surgical History:   Procedure Laterality Date    BONE BIOPSY      broken bone surgery in his face    BRONCHOSCOPY THORACOTOMY Right 01/09/2024    Procedure: THORACOTOMY FOR LOWER LOBECTOMY AND MEDISTINAL LYMPH NODE DISSECTION RIGHT;  Surgeon: Joey Patel MD;  Location: [facility] OR;  Service: Cardiothoracic;  Laterality: Right;    BRONCHOSCOPY WITH ION ROBOTIC ASSIST N/A 09/15/2023    Procedure: BRONCHOSCOPY NAVIGATION WITH ENDOBRONCHIAL ULTRASOUND AND ION ROBOT;  Surgeon: Octaviano Sampson MD;  Location:  [facility];  Service: Robotics - Pulmonary;  Laterality: N/A;  ion #6 - 0032  - 0015  Cath guide 0061    EBUS balloon removed and intact    CARDIAC ELECTROPHYSIOLOGY PROCEDURE N/A 08/17/2021    Procedure: Pacemaker DC new;  Surgeon: Kayy Box MD;  Location:  [facility];  Service: Cardiology;  Laterality: N/A;    FACIAL FRACTURE SURGERY      LYMPH NODE BIOPSY  2023    PACEMAKER IMPLANTATION         Family History:  family history includes Aneurysm in his mother; Cancer in his sister; Dementia in his father; Heart disease in his paternal grandmother; Hypertension in his paternal grandfather; Leukemia in his sister.     Social History:  reports that he has been smoking cigarettes. He started smoking about 56 years ago. He has a 28.7 pack-year smoking history. He has never used smokeless tobacco. He reports that he does not drink alcohol and does not use drugs.  Social History     Social History Narrative    Lives in [place], [place]       Medications:  Fluticasone-Umeclidin-Vilant, HYDROcodone-acetaminophen, albuterol sulfate HFA, ferrous sulfate, fluticasone, lidocaine-prilocaine, megestrol, midodrine, omeprazole, ondansetron, pravastatin, sodium bicarbonate, vancomycin, and vitamin b complex    Allergies   Allergen Reactions     "Cymbalta [Duloxetine Hcl] GI Intolerance    Gabapentin Mental Status Change     Pt states that this medication \"makes him feel foolish in his head\".     Remeron [Mirtazapine] Other (See Comments)     Excess sedation    Toradol [Ketorolac Tromethamine] GI Intolerance     Projectile vomiting     Latex Other (See Comments)     Latex allergy     Tape Rash       Objective   Objective     Vital Signs:   Temp:  [97.7 °F (36.5 °C)] 97.7 °F (36.5 °C)  Heart Rate:  [70-94] 70  Resp:  [18] 18  BP: ()/(60-74) 112/64    Physical Exam  Vitals and nursing note reviewed.   Constitutional:       General: He is not in acute distress.     Appearance: Normal appearance. He is not ill-appearing, toxic-appearing or diaphoretic.   HENT:      Head: Normocephalic and atraumatic.      Nose: Nose normal.      Mouth/Throat:      Mouth: Mucous membranes are dry.      Pharynx: Oropharynx is clear.   Eyes:      Extraocular Movements: Extraocular movements intact.      Conjunctiva/sclera: Conjunctivae normal.      Pupils: Pupils are equal, round, and reactive to light.   Cardiovascular:      Rate and Rhythm: Normal rate and regular rhythm.      Pulses: Normal pulses.      Heart sounds: Normal heart sounds.   Pulmonary:      Effort: Pulmonary effort is normal.      Breath sounds: Normal breath sounds.   Abdominal:      General: Bowel sounds are normal. There is no distension.      Palpations: Abdomen is soft. There is no mass.      Tenderness: There is no abdominal tenderness. There is no right CVA tenderness, left CVA tenderness, guarding or rebound.      Hernia: No hernia is present.   Musculoskeletal:         General: No swelling, tenderness, deformity or signs of injury. Normal range of motion.      Cervical back: Normal range of motion and neck supple.      Right lower leg: No edema.      Left lower leg: No edema.   Skin:     General: Skin is warm and dry.   Neurological:      General: No focal deficit present.      Mental Status: He is " alert and oriented to person, place, and time. Mental status is at baseline.   Psychiatric:         Mood and Affect: Mood normal.         Behavior: Behavior normal.         Judgment: Judgment normal.            Result Review:  I have personally reviewed the results from the time of this admission to 5/25/2024 05:58 EDT and agree with these findings:  [x]  Laboratory list / accordion  [x]  Microbiology  [x]  Radiology  [x]  EKG/Telemetry   []  Cardiology/Vascular   []  Pathology  []  Old records  []  Other:  Most notable findings include:     LAB RESULTS:      Lab 05/25/24 0343 05/23/24  0715 05/22/24 0310 05/21/24 0448 05/20/24 1820 05/19/24  0940   WBC 30.99* 15.69* 15.36* 17.18* 22.57* 21.85*   HEMOGLOBIN 10.3* 9.0* 9.2* 8.9* 9.5* 10.0*   HEMATOCRIT 31.6* 27.6* 28.1* 28.0* 29.8* 32.3*   PLATELETS 294 269 290 288 324 326   NEUTROS ABS 28.51* 12.05* 11.94*  --  19.28* 19.05*   IMMATURE GRANS (ABS)  --  0.31* 0.33*  --  0.31* 0.19*   LYMPHS ABS  --  1.35 1.20  --  0.94 1.02   MONOS ABS  --  1.89* 1.81*  --  1.82* 1.37*   EOS ABS 0.00 0.05 0.05  --  0.16 0.16   MCV 88.5 89.6 93.4 92.7 91.7 94.7   PROCALCITONIN  --   --   --   --   --  1.03*   LACTATE 2.3*  --   --   --   --  1.3   CK TOTAL 9*  --   --   --   --   --          Lab 05/25/24 0343 05/22/24 0310 05/21/24 0448 05/20/24 1820 05/19/24  0940   SODIUM 135* 133* 134* 133* 136   POTASSIUM 3.9 4.2 4.3 4.4 4.2   CHLORIDE 105 102 103 101 104   CO2 13.0* 16.0* 17.0* 17.0* 19.0*   ANION GAP 17.0* 15.0 14.0 15.0 13.0   BUN 28* 33* 37* 36* 34*   CREATININE 3.27* 3.86* 4.49* 4.57* 4.73*   EGFR 18.9* 15.5* 12.9* 12.7* 12.2*   GLUCOSE 102* 85 91 108* 118*   CALCIUM 8.4* 8.3* 8.4* 8.9 9.2   MAGNESIUM  --   --   --   --  1.5*   PHOSPHORUS  --  3.7  --   --   --          Lab 05/25/24  0343 05/22/24  0310 05/20/24  1820 05/19/24  0940   TOTAL PROTEIN 6.3  --  7.6 7.3   ALBUMIN 2.7* 3.0* 3.4* 3.6   GLOBULIN 3.6  --  4.2 3.7   ALT (SGPT) 22  --  9 9   AST (SGOT) 25  --   13 11   BILIRUBIN 0.3  --  0.2 0.2   ALK PHOS 108  --  101 98         Lab 05/25/24  0343 05/19/24  0940   HSTROP T 31* 33*                 Brief Urine Lab Results  (Last result in the past 365 days)        Color   Clarity   Blood   Leuk Est   Nitrite   Protein   CREAT   Urine HCG        05/25/24 0354 Yellow   Clear   Trace   Moderate (2+)   Negative   100 mg/dL (2+)                 Microbiology Results (last 10 days)       Procedure Component Value - Date/Time    Clostridioides difficile Toxin - Stool, Per Rectum [[id number]]  (Abnormal) Collected: 05/22/24 0910    Lab Status: Final result Specimen: Stool from Per Rectum Updated: 05/22/24 1104    Narrative:      The following orders were created for panel order Clostridioides difficile Toxin - Stool, Per Rectum.  Procedure                               Abnormality         Status                     ---------                               -----------         ------                     Clostridioides difficile...[[id number]]  Abnormal            Final result                 Please view results for these tests on the individual orders.    Clostridioides difficile Toxin, PCR - Stool, Per Rectum [[id number]]  (Abnormal) Collected: 05/22/24 0910    Lab Status: Final result Specimen: Stool from Per Rectum Updated: 05/22/24 1104     Toxigenic C. difficile by PCR Detected    Narrative:      DNA from a toxigenic strain of C.difficile has been detected.    Clostridioides difficile toxin Ag, Reflex - Stool, Per Rectum [[id number]]  (Abnormal) Collected: 05/22/24 0910    Lab Status: Final result Specimen: Stool from Per Rectum Updated: 05/22/24 1137     C.diff Toxin Ag Positive    Narrative:      DNA from a toxigenic strain of C.difficile was detected, along with the presence of free toxin. These results are suggestive of C.difficile infection.    AFB Culture - Body Fluid, Urine, Random Void [[id number]] Collected: 05/21/24 1914    Lab Status: Preliminary result Specimen: Body Fluid  from Urine, Random Void Updated: 05/22/24 1152     AFB Stain No acid fast bacilli seen on concentrated smear    Gastrointestinal Panel, PCR - Stool, Per Rectum [[id number]]  (Normal) Collected: 05/21/24 0841    Lab Status: Final result Specimen: Stool from Per Rectum Updated: 05/21/24 1027     Campylobacter Not Detected     Plesiomonas shigelloides Not Detected     Salmonella Not Detected     Vibrio Not Detected     Vibrio cholerae Not Detected     Yersinia enterocolitica Not Detected     Enteroaggregative E. coli (EAEC) Not Detected     Enteropathogenic E. coli (EPEC) Not Detected     Enterotoxigenic E. coli (ETEC) lt/st Not Detected     Shiga-like toxin-producing E. coli (STEC) stx1/stx2 Not Detected     Shigella/Enteroinvasive E. coli (EIEC) Not Detected     Cryptosporidium Not Detected     Cyclospora cayetanensis Not Detected     Entamoeba histolytica Not Detected     Giardia lamblia Not Detected     Adenovirus F40/41 Not Detected     Astrovirus Not Detected     Norovirus GI/GII Not Detected     Rotavirus A Not Detected     Sapovirus (I, II, IV or V) Not Detected    Histoplasma Ag Ur - Urine, Urine, Clean Catch [[id number]] Collected: 05/20/24 1832    Lab Status: Final result Specimen: Urine, Clean Catch Updated: 05/23/24 1512     Histoplasma Galactomannan Ag Ur Negative    Narrative:      Performed at:  [address] [place] [address]  [id number]  : Miguelina Munoz MD, Phone:  [phone]    Blood Culture - Blood, Chest, Left [[id number]]  (Normal) Collected: 05/20/24 1815    Lab Status: Preliminary result Specimen: Blood from Chest, Left Updated: 05/24/24 1900     Blood Culture No growth at 4 days    Blood Culture - Blood, Hand, Left [[id number]]  (Normal) Collected: 05/20/24 1810    Lab Status: Preliminary result Specimen: Blood from Hand, Left Updated: 05/24/24 1900     Blood Culture No growth at 4 days    Blood Culture - Blood, Arm, Left [[id number]]  (Normal) Collected:  05/20/24 1800    Lab Status: Preliminary result Specimen: Blood from Arm, Left Updated: 05/24/24 1900     Blood Culture No growth at 4 days    Urine Culture - Urine, Urine, Clean Catch [[id number]]  (Normal) Collected: 05/19/24 1036    Lab Status: Final result Specimen: Urine, Clean Catch Updated: 05/20/24 1023     Urine Culture No growth    Blood Culture - Blood, Arm, Left [[id number]]  (Normal) Collected: 05/19/24 1018    Lab Status: Final result Specimen: Blood from Arm, Left Updated: 05/24/24 1031     Blood Culture No growth at 5 days    Narrative:      Less than seven (7) mL's of blood was collected.  Insufficient quantity may yield false negative results.    Blood Culture - Blood, Arm, Left [[id number]]  (Normal) Collected: 05/19/24 1014    Lab Status: Final result Specimen: Blood from Arm, Left Updated: 05/24/24 1031     Blood Culture No growth at 5 days    Narrative:      Less than seven (7) mL's of blood was collected.  Insufficient quantity may yield false negative results.            No radiology results from the last 24 hrs    Results for orders placed during the hospital encounter of 05/05/24    Adult Transesophageal Echo 3D (DAMIÁN) W/ Cont If Necessary Per Protocol    Interpretation Summary    Left ventricular systolic function is normal. Left ventricular ejection fraction appears to be 61 - 65%. Normal left ventricular cavity size noted. Left ventricular wall thickness is consistent with mild concentric hypertrophy. All left ventricular wall segments contract normally.    There is mild calcification of the aortic valve. The aortic valve appears trileaflet. Mild aortic valve regurgitation is present. No aortic valve stenosis is present. There is no evidence of an aortic valve mass is present.    There is mild calcification of the mitral valve. There is mild, bileaflet mitral valve thickening present. No evidence of a mitral valve mass is present. Mild to moderate mitral valve regurgitation is present. No  significant mitral valve stenosis is present.    The tricuspid valve is structurally normal with no significant stenosis present. There is no evidence of a mass on the tricuspid valve. Trace tricuspid valve regurgitation is present.    The pulmonic valve is grossly normal in structure. There is trace pulmonic valve regurgitation present.    No vegetation seen on atrial aspect of pacemaker leads.  The most distal aspects of the RV lead were not completely visualized however there was no apparent vegetation in the more proximal segment, adjacent to the tricuspid valve which appears to be functioning normally.      Assessment & Plan   Assessment & Plan       Mixed hyperlipidemia    Centrilobular emphysema    Tobacco abuse    Malignant neoplasm of lower lobe of right lung    GERD without esophagitis    CAD (coronary artery disease)    Stage 4 chronic kidney disease    Hypotension    Dehydration    C. difficile colitis    UTI (urinary tract infection)      74-year-old male presents the ED due to low BP, nausea, vomiting and generalized weakness.    1) hypotension, fluid responsive  - BP improved after IV fluids  - Recent workup included TTE, appropriate cortisol, normal TSH, ACTH stimulation test was negative  - Continue IV fluids  - Encourage oral intake  - Continue midodrine    2) C. difficile colitis  - Continue oral vancomycin 125 mg every 6 for total of 2 weeks  - Consult ID in a.m.    3) leukocytosis  - WBC at discharge was 15,000 currently 30,000  - UA noted above  - Continue oral vancomycin  - Per ID hold on further IV antibiotics at this time  - Continue IV fluids  - Repeat labs in a.m.    4) NSCLC  - Status post neoadjuvant chemo, RLL lobectomy 1/20/2024, immunotherapy (last dose of optivo 4/4/2024)  - Follows with Dr. Ashley  -Per wife all treatment on hold currently    5) CAD       AV block status post PPM       Hx hypertension  - Continue statin, hold amlodipine due to hypotension    6) chronic anemia  -  "Overall stable  - Continue iron supplement    7) emphysema      Tobacco abuse  -Trelegy           DVT prophylaxis:  scds    CODE STATUS:  Full Code        Expected Discharge  TBD     This note has been completed as part of a split-shared workflow.     Signature: Electronically signed by ANAMIKA Saba, 05/25/24, 6:15 AM EDT.      Attending   Admission Attestation       I have performed an independent face-to-face diagnostic evaluation including performing an independent physical examination.  I approve of the documented plan of care above that was reviewed and developed with the advanced practice clinician (APC) and take responsibility for that plan along with its associated risks.  I have updated the HPI as appropriate.    Brief HPI    74 M has had several admissions here over the last 6 weeks, most recent discharge from here was 2 days ago (5/23).  He states that on Friday (5/24) he checked his blood pressure with electronic device at home and had a reading of \"50s over 20s.\"  He states that over the last 2-3 days he has noticed that when the rises from a seated or supine position he has transient dizziness/lightheadedness and \"my blood pressure drops really low.\"  The patient's wife states she gives him some midodrine at this time and she adds that \"it can take a long time to work.\"  He also states he has had some increased burning on urination over the last few days, he does have a history of persistent UTI.  ER physician states that he discussed the patient's antibiotic regimen with the ID doctor on-call, as the patient does have a history of C. difficile and is currently receiving oral vancomycin.  ED physician states that the ID physician recommended starting no further antibiotics at this time, will see the patient in consult later today.  The patient states he barely takes in any food or fluids due to decreased appetite, confirming that he drinks less than 1 small bottle of water every day.  ED " provider confirms the patient's hypotension which was present on arrival has improved with IV fluids.    Attending Physical Exam:  Temp:  [97.7 °F (36.5 °C)] 97.7 °F (36.5 °C)  Heart Rate:  [70-94] 70  Resp:  [18] 18  BP: ()/(60-74) 112/64    Constitutional: Awake, alert, NAD.  Eyes: PERRLA, sclerae anicteric, dry eyes but no conjunctival injection  HENT: NCAT, mucous membranes dry.  Neck: Supple, no thyromegaly, no lymphadenopathy, trachea midline  Respiratory: Clear to auscultation bilaterally, nonlabored respirations   Cardiovascular: RRR with occasional ectopic beat, no murmurs, rubs, or gallops, palpable pedal pulses bilaterally  Gastrointestinal: Positive but hypoactive bowel sounds, soft, nontender, nondistended  Musculoskeletal: No bilateral ankle edema, no clubbing or cyanosis to extremities  Psychiatric: Appropriate affect, cooperative  Neurologic: Oriented x 3, strength symmetric in all extremities, Cranial Nerves grossly intact to confrontation, speech clear  Skin: No rashes, poor turgor.    Result Review:  I have personally reviewed the results from the time of this admission to 5/25/2024 06:29 EDT and agree with these findings:  [x]  Laboratory list / accordion  []  Microbiology  []  Radiology  [x]  EKG/Telemetry   []  Cardiology/Vascular   []  Pathology  [x]  Old records  []  Other:  Most notable findings include: I reviewed EKG which by my read shows regular rhythm, some sinus beats but others are paced, appear to be also some ineffective pacer spikes, normal axis, nonspecific ST/T wave changes.    Assessment and Plan:    See assessment and plan documented by APC above and updated/edited by me as appropriate.      Total time spent: 41 minutes  Time spent includes time reviewing chart, face-to-face time, counseling patient/family/caregiver, ordering medications/tests/procedures, communicating with other health care professionals, documenting clinical information in the electronic health record,  and coordination of care.       Ash Ann III, DO  05/25/24

## 2024-05-25 NOTE — Clinical Note
Level of Care: Telemetry [5]   Diagnosis: Hx of hypotension [340105]   Admitting Physician: KERRI OLIVARES III [[id number]]   Attending Physician: KERRI OLIVARES III [[id number]]   Bed Request Comments: tele obs (not CDU)

## 2024-05-25 NOTE — ED PROVIDER NOTES
"Subjective   History of Present Illness  Patient presents for evaluation of symptoms including nausea, vomiting, generalized weakness.  Patient has a complex recent medical history, he has been hospitalized in our facility multiple times recently with renal failure and a suspected infectious pathology including C. difficile.  He is also undergoing treatment for lung cancer and has been diagnosed with severe malnutrition.  Since he left the hospital 2 days ago he has had worsening of his symptoms, he states he has been eating a little bit better but is not drinking much.  He has not spiked any fevers.  He has had some intermittent abdominal discomfort but not specifically pain.  No chest pain or difficulty breathing.    History provided by:  Patient      Review of Systems    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    Coronary artery disease 2019    Diabetes mellitus Borderline    Emphysema/COPD     Erectile disorder     GERD (gastroesophageal reflux disease)     History of chemotherapy     Hyperlipidemia     Hypertension 2019    Lung nodule     Mumps     Mumps     Pruritus     after bath    Slow to wake up after anesthesia     Stage 5 chronic kidney disease not on chronic dialysis 5/14/2024    Wears dentures     upper only    Wears hearing aid in both ears     usually only wears right       Allergies   Allergen Reactions    Cymbalta [Duloxetine Hcl] GI Intolerance    Gabapentin Mental Status Change     Pt states that this medication \"makes him feel foolish in his head\".     Remeron [Mirtazapine] Other (See Comments)     Excess sedation    Toradol [Ketorolac Tromethamine] GI Intolerance     Projectile vomiting     Latex Other (See Comments)     Latex allergy     Tape Rash       Past Surgical History:   Procedure Laterality Date    BONE BIOPSY      broken bone surgery in his face    BRONCHOSCOPY " THORACOTOMY Right 01/09/2024    Procedure: THORACOTOMY FOR LOWER LOBECTOMY AND MEDISTINAL LYMPH NODE DISSECTION RIGHT;  Surgeon: Joey Patel MD;  Location: [facility] OR;  Service: Cardiothoracic;  Laterality: Right;    BRONCHOSCOPY WITH ION ROBOTIC ASSIST N/A 09/15/2023    Procedure: BRONCHOSCOPY NAVIGATION WITH ENDOBRONCHIAL ULTRASOUND AND ION ROBOT;  Surgeon: Octaviano Sampson MD;  Location: [facility] ENDOSCOPY;  Service: Robotics - Pulmonary;  Laterality: N/A;  ion #6 - [id number]  - 0015  Cath guide 0061    EBUS balloon removed and intact    CARDIAC ELECTROPHYSIOLOGY PROCEDURE N/A 08/17/2021    Procedure: Pacemaker DC new;  Surgeon: Kayy Box MD;  Location: [facility] CATH INVASIVE LOCATION;  Service: Cardiology;  Laterality: N/A;    FACIAL FRACTURE SURGERY      LYMPH NODE BIOPSY  2023    PACEMAKER IMPLANTATION         Family History   Problem Relation Age of Onset    Aneurysm Mother         brain    Dementia Father     Leukemia Sister     Heart disease Paternal Grandmother     Hypertension Paternal Grandfather     Cancer Sister        Social History     Socioeconomic History    Marital status: Single    Number of children: 3   Tobacco Use    Smoking status: Every Day     Current packs/day: 0.50     Average packs/day: 0.5 packs/day for 56.4 years (28.7 ttl pk-yrs)     Types: Cigarettes     Start date: 1/1/1968    Smokeless tobacco: Never    Tobacco comments:     Still smoke   Vaping Use    Vaping status: Never Used   Substance and Sexual Activity    Alcohol use: Never    Drug use: Never    Sexual activity: Yes     Partners: Female     Birth control/protection: None           Objective   Physical Exam  Constitutional:       General: He is not in acute distress.  HENT:      Head: Normocephalic and atraumatic.      Mouth/Throat:      Mouth: Mucous membranes are dry.   Eyes:      Conjunctiva/sclera: Conjunctivae normal.      Pupils: Pupils are equal, round, and reactive to light.   Cardiovascular:      Rate  and Rhythm: Normal rate and regular rhythm.      Pulses: Normal pulses.      Heart sounds: No murmur heard.     No gallop.   Pulmonary:      Effort: Pulmonary effort is normal. No respiratory distress.   Abdominal:      General: Abdomen is flat. There is no distension.      Tenderness: There is no abdominal tenderness.   Musculoskeletal:         General: No swelling or deformity. Normal range of motion.      Right lower leg: No edema.      Left lower leg: No edema.   Skin:     General: Skin is warm and dry.      Capillary Refill: Capillary refill takes less than 2 seconds.   Neurological:      General: No focal deficit present.      Mental Status: He is alert and oriented to person, place, and time.   Psychiatric:         Mood and Affect: Mood normal.         Behavior: Behavior normal.         Critical Care    Performed by: Braydon Muniz MD  Authorized by: Braydon Muniz MD    Critical care provider statement:     Critical care time (minutes):  40    Critical care time was exclusive of:  Separately billable procedures and treating other patients    Critical care was necessary to treat or prevent imminent or life-threatening deterioration of the following conditions:  Dehydration    Critical care was time spent personally by me on the following activities:  Development of treatment plan with patient or surrogate, discussions with consultants, evaluation of patient's response to treatment, examination of patient, obtaining history from patient or surrogate, ordering and performing treatments and interventions, ordering and review of laboratory studies, ordering and review of radiographic studies, pulse oximetry, re-evaluation of patient's condition and review of old charts    I assumed direction of critical care for this patient from another provider in my specialty: no      Care discussed with: admitting provider               ED Course  ED Course as of 05/25/24 0547   Sat May 25, 2024   0407 Twelve-lead ECG  independently interpreted by myself demonstrates an atrial paced rhythm with a rate of 94, no ST segment elevation or depression. [KB]   5605 Laboratory workup independently interpreted by myself demonstrates significant leukocytosis, evidence of chronic kidney disease without severe electrolyte derangement, pyuria [KB]      ED Course User Index  [KB] Braydon Muniz MD                                             Medical Decision Making  Differential diagnosis includes dehydration, sepsis, electrolyte derangement, sequela of malnutrition.  Laboratory studies were conducted.  IV fluid resuscitation was conducted    Patient clinically improving with IV fluids.  Hypotension resolved.        Problems Addressed:  C. difficile colitis: complicated acute illness or injury  Hypotension due to hypovolemia: complicated acute illness or injury  Leukocytosis, unspecified type: complicated acute illness or injury    Amount and/or Complexity of Data Reviewed  Independent Historian: EMS     Details: Prehospital course by EMS  External Data Reviewed: notes.     Details: 5/23/2024 reviewed infectious disease provider note which gives a thoughtfully formulated recent history with multiple hospital admissions and treatment plan  Labs: ordered. Decision-making details documented in ED Course.  ECG/medicine tests: ordered and independent interpretation performed. Decision-making details documented in ED Course.  Discussion of management or test interpretation with external provider(s): Consulted with infectious disease physician.  Will plan to continue oral vancomycin.  Will hold IV antibiotics at this time as it may worsen his C. difficile infection.  Hospital medicine consulted for admission    Risk  Prescription drug management.  Decision regarding hospitalization.    Critical Care  Total time providing critical care: 40 minutes        Final diagnoses:   C. difficile colitis   Leukocytosis, unspecified type   Hypotension due to  hypovolemia       ED Disposition  ED Disposition       ED Disposition   Decision to Admit    Condition   --    Comment   --             Recent Results (from the past 24 hour(s))   Comprehensive Metabolic Panel    Collection Time: 05/25/24  3:43 AM    Specimen: Blood   Result Value Ref Range    Glucose 102 (H) 65 - 99 mg/dL    BUN 28 (H) 8 - 23 mg/dL    Creatinine 3.27 (H) 0.76 - 1.27 mg/dL    Sodium 135 (L) 136 - 145 mmol/L    Potassium 3.9 3.5 - 5.2 mmol/L    Chloride 105 98 - 107 mmol/L    CO2 13.0 (L) 22.0 - 29.0 mmol/L    Calcium 8.4 (L) 8.6 - 10.5 mg/dL    Total Protein 6.3 6.0 - 8.5 g/dL    Albumin 2.7 (L) 3.5 - 5.2 g/dL    ALT (SGPT) 22 1 - 41 U/L    AST (SGOT) 25 1 - 40 U/L    Alkaline Phosphatase 108 39 - 117 U/L    Total Bilirubin 0.3 0.0 - 1.2 mg/dL    Globulin 3.6 gm/dL    A/G Ratio 0.8 g/dL    BUN/Creatinine Ratio 8.6 7.0 - 25.0    Anion Gap 17.0 (H) 5.0 - 15.0 mmol/L    eGFR 18.9 (L) >60.0 mL/min/1.73   Single High Sensitivity Troponin T    Collection Time: 05/25/24  3:43 AM    Specimen: Blood   Result Value Ref Range    HS Troponin T 31 (H) <22 ng/L   Lactic Acid, Plasma    Collection Time: 05/25/24  3:43 AM    Specimen: Blood   Result Value Ref Range    Lactate 2.3 (C) 0.5 - 2.0 mmol/L   CK    Collection Time: 05/25/24  3:43 AM    Specimen: Blood   Result Value Ref Range    Creatine Kinase 9 (L) 20 - 200 U/L   CBC Auto Differential    Collection Time: 05/25/24  3:43 AM    Specimen: Blood   Result Value Ref Range    WBC 30.99 (C) 3.40 - 10.80 10*3/mm3    RBC 3.57 (L) 4.14 - 5.80 10*6/mm3    Hemoglobin 10.3 (L) 13.0 - 17.7 g/dL    Hematocrit 31.6 (L) 37.5 - 51.0 %    MCV 88.5 79.0 - 97.0 fL    MCH 28.9 26.6 - 33.0 pg    MCHC 32.6 31.5 - 35.7 g/dL    RDW 16.7 (H) 12.3 - 15.4 %    RDW-SD 54.4 (H) 37.0 - 54.0 fl    MPV 8.7 6.0 - 12.0 fL    Platelets 294 140 - 450 10*3/mm3   Manual Differential    Collection Time: 05/25/24  3:43 AM    Specimen: Blood   Result Value Ref Range    Neutrophil % 83.0 (H) 42.7 -  76.0 %    Lymphocyte % 2.0 (L) 19.6 - 45.3 %    Monocyte % 2.0 (L) 5.0 - 12.0 %    Eosinophil % 0.0 (L) 0.3 - 6.2 %    Basophil % 0.0 0.0 - 1.5 %    Bands %  9.0 (H) 0.0 - 5.0 %    Metamyelocyte % 2.0 (H) 0.0 - 0.0 %    Atypical Lymphocyte % 2.0 0.0 - 5.0 %    Neutrophils Absolute 28.51 (H) 1.70 - 7.00 10*3/mm3    Lymphocytes Absolute 1.24 0.70 - 3.10 10*3/mm3    Monocytes Absolute 0.62 0.10 - 0.90 10*3/mm3    Eosinophils Absolute 0.00 0.00 - 0.40 10*3/mm3    Basophils Absolute 0.00 0.00 - 0.20 10*3/mm3    nRBC 0.0 0.0 - 0.2 /100 WBC    Scarlett Cells Slight/1+ None Seen    WBC Morphology Normal Normal    Platelet Morphology Normal Normal   Urinalysis With Microscopic If Indicated (No Culture) - Urine, Clean Catch    Collection Time: 05/25/24  3:54 AM    Specimen: Urine, Clean Catch   Result Value Ref Range    Color, UA Yellow Yellow, Straw    Appearance, UA Clear Clear    pH, UA 7.0 5.0 - 8.0    Specific Gravity, UA 1.012 1.001 - 1.030    Glucose,  mg/dL (Trace) (A) Negative    Ketones, UA Negative Negative    Bilirubin, UA Negative Negative    Blood, UA Trace (A) Negative    Protein,  mg/dL (2+) (A) Negative    Leuk Esterase, UA Moderate (2+) (A) Negative    Nitrite, UA Negative Negative    Urobilinogen, UA 0.2 E.U./dL 0.2 - 1.0 E.U./dL   Urinalysis, Microscopic Only - Urine, Clean Catch    Collection Time: 05/25/24  3:54 AM    Specimen: Urine, Clean Catch   Result Value Ref Range    RBC, UA 0-2 None Seen, 0-2 /HPF    WBC, UA 21-50 (A) None Seen, 0-2 /HPF    Bacteria, UA 1+ (A) None Seen, Trace /HPF    Squamous Epithelial Cells, UA 3-6 (A) None Seen, 0-2 /HPF    Renal Epithelial Cells, UA 0-2 0 - 2 /HPF    Hyaline Casts, UA 0-6 0 - 6 /LPF    Methodology Manual Light Microscopy    ECG 12 Lead Other; weakness    Collection Time: 05/25/24  4:04 AM   Result Value Ref Range    QT Interval 394 ms    QTC Interval 492 ms     Note: In addition to lab results from this visit, the labs listed above may include labs  taken at another facility or during a different encounter within the last 24 hours. Please correlate lab times with ED admission and discharge times for further clarification of the services performed during this visit.    No orders to display     Vitals:    05/25/24 0400 05/25/24 0500 05/25/24 0505 05/25/24 0530   BP: (!) 80/60 104/74  112/64   Pulse: 91 80 80 70   Resp:       Temp:       TempSrc:       SpO2: 97% 96% 98% 96%   Weight:       Height:         Medications   lactated ringers bolus 1,000 mL (1,000 mL Intravenous New Bag 5/25/24 0352)   midodrine (PROAMATINE) tablet 10 mg (10 mg Oral Given 5/25/24 0439)     ECG/EMG Results (last 24 hours)       Procedure Component Value Units Date/Time    ECG 12 Lead Other; weakness [[id number]] Collected: 05/25/24 0404     Updated: 05/25/24 0406     QT Interval 394 ms      QTC Interval 492 ms     Narrative:      Test Reason : Other~  Blood Pressure :   */*   mmHG  Vent. Rate :  94 BPM     Atrial Rate :  94 BPM     P-R Int :   * ms          QRS Dur :  92 ms      QT Int : 394 ms       P-R-T Axes :   *  60  67 degrees     QTc Int : 492 ms    ** Suspect unspecified pacemaker failure  Sinus rhythm with AV dissociation and Accelerated Junctional rhythm with occasional ventricular-paced complexes  Prolonged QT  Abnormal ECG  When compared with ECG of 19-MAY-2024 09:39,  Electronic ventricular pacemaker has replaced Electronic atrial pacemaker    Referred By: EDMD           Confirmed By:           ECG 12 Lead Other; weakness   Preliminary Result   Test Reason : Other~   Blood Pressure :   */*   mmHG   Vent. Rate :  94 BPM     Atrial Rate :  94 BPM      P-R Int :   * ms          QRS Dur :  92 ms       QT Int : 394 ms       P-R-T Axes :   *  60  67 degrees      QTc Int : 492 ms      ** Suspect unspecified pacemaker failure   Sinus rhythm with AV dissociation and Accelerated Junctional rhythm with    occasional ventricular-paced complexes   Prolonged QT   Abnormal ECG   When  compared with ECG of 19-MAY-2024 09:39,   Electronic ventricular pacemaker has replaced Electronic atrial pacemaker      Referred By: EDMD           Confirmed By:               No follow-up provider specified.       Medication List      No changes were made to your prescriptions during this visit.            Braydon Muniz MD  05/25/24 2700

## 2024-05-25 NOTE — PLAN OF CARE
Goal Outcome Evaluation:                 Patient A&O x4 able to voice wants and needs to staff has denied pain today. Able to turn and reposition self while in bed. Continues with isolation for C-Diff. Has alarms on and in place with call light within reach.

## 2024-05-25 NOTE — PROGRESS NOTES
[facility] Medicine Services  ADMISSION FOLLOW-UP NOTE          Patient admitted after midnight, H&P by my partner performed earlier on today's date reviewed.  Interim findings, labs, and charting also reviewed.        [facility] Hospital Problem List has been managed and updated to include any new diagnoses:  Active Hospital Problems    Diagnosis  POA    **Hx of hypotension [Z86.79]  Not Applicable    Hypotension [I95.9]  Yes    Dehydration [E86.0]  Yes    C. difficile colitis [A04.72]  Yes    UTI (urinary tract infection) [N39.0]  Unknown    Stage 4 chronic kidney disease [N18.4]  Yes    CAD (coronary artery disease) [I25.10]  Yes    GERD without esophagitis [K21.9]  Yes    Malignant neoplasm of lower lobe of right lung [C34.31]  Yes    Centrilobular emphysema [J43.2]  Yes    Tobacco abuse [Z72.0]  Yes    Mixed hyperlipidemia [E78.2]  Yes      Resolved Hospital Problems   No resolved problems to display.         ADDITIONAL PLAN:  - detailed assessment and plan from admission reviewed  -History of non-small cell lung cancer admitted for low BP, nausea, vomiting and generalized weakness.     Hypotension, fluid responsive  - BP improved after IV fluids  - Recent workup included TTE, appropriate cortisol, normal TSH, ACTH stimulation test was negative  - Continue IV fluids  - Encourage oral intake  - Continue midodrine    CKD  Metabolic acidosis  -Continue sodium bicarb      C. difficile colitis  - Continue oral vancomycin 125 mg every 6 hours for total of 2 weeks  - ID consulted.  - Check CT abdomen/pelvis without contrast     Leukocytosis  - WBC at discharge was 15,000 but 30,000 on admission  - UA noted above  - Continue oral vancomycin  - Per ID hold on further IV antibiotics at this time       NSCLC  - Status post neoadjuvant chemo, RLL lobectomy 1/20/2024, immunotherapy (last dose of optivo 4/4/2024)  - Follows with Dr. Ashley  -Per wife all treatment on hold currently     CAD  AV  block status post PPM  Hx hypertension  - Continue statin, hold amlodipine due to hypotension     Chronic anemia  - Monitor CBC  - Continue iron supplement     Emphysema  Tobacco abuse  -Trelegy    Expected Discharge   Expected Discharge Date: 5/28/2024; Expected Discharge Time:      Ela Gómez DO  05/25/24

## 2024-05-26 LAB
ALBUMIN SERPL-MCNC: 2.4 G/DL (ref 3.5–5.2)
ANION GAP SERPL CALCULATED.3IONS-SCNC: 12 MMOL/L (ref 5–15)
BACTERIA SPEC AEROBE CULT: NO GROWTH
BASOPHILS # BLD AUTO: 0.06 10*3/MM3 (ref 0–0.2)
BASOPHILS NFR BLD AUTO: 0.3 % (ref 0–1.5)
BUN SERPL-MCNC: 40 MG/DL (ref 8–23)
BUN/CREAT SERPL: 6.7 (ref 7–25)
CALCIUM SPEC-SCNC: 8 MG/DL (ref 8.6–10.5)
CHLORIDE SERPL-SCNC: 100 MMOL/L (ref 98–107)
CO2 SERPL-SCNC: 21 MMOL/L (ref 22–29)
CREAT SERPL-MCNC: 5.94 MG/DL (ref 0.76–1.27)
DEPRECATED RDW RBC AUTO: 56.9 FL (ref 37–54)
EGFRCR SERPLBLD CKD-EPI 2021: 9.3 ML/MIN/1.73
EOSINOPHIL # BLD AUTO: 0.02 10*3/MM3 (ref 0–0.4)
EOSINOPHIL NFR BLD AUTO: 0.1 % (ref 0.3–6.2)
ERYTHROCYTE [DISTWIDTH] IN BLOOD BY AUTOMATED COUNT: 17.1 % (ref 12.3–15.4)
FUNGUS WND CULT: NORMAL
GLUCOSE SERPL-MCNC: 95 MG/DL (ref 65–99)
HCT VFR BLD AUTO: 25.5 % (ref 37.5–51)
HGB BLD-MCNC: 8.4 G/DL (ref 13–17.7)
IMM GRANULOCYTES # BLD AUTO: 0.38 10*3/MM3 (ref 0–0.05)
IMM GRANULOCYTES NFR BLD AUTO: 1.6 % (ref 0–0.5)
LYMPHOCYTES # BLD AUTO: 1.8 10*3/MM3 (ref 0.7–3.1)
LYMPHOCYTES NFR BLD AUTO: 7.5 % (ref 19.6–45.3)
MCH RBC QN AUTO: 29.7 PG (ref 26.6–33)
MCHC RBC AUTO-ENTMCNC: 32.9 G/DL (ref 31.5–35.7)
MCV RBC AUTO: 90.1 FL (ref 79–97)
MONOCYTES # BLD AUTO: 1.35 10*3/MM3 (ref 0.1–0.9)
MONOCYTES NFR BLD AUTO: 5.6 % (ref 5–12)
MYCOBACTERIUM SPEC CULT: NORMAL
NEUTROPHILS NFR BLD AUTO: 20.34 10*3/MM3 (ref 1.7–7)
NEUTROPHILS NFR BLD AUTO: 84.9 % (ref 42.7–76)
NIGHT BLUE STAIN TISS: NORMAL
NRBC BLD AUTO-RTO: 0 /100 WBC (ref 0–0.2)
PHOSPHATE SERPL-MCNC: 3.4 MG/DL (ref 2.5–4.5)
PLATELET # BLD AUTO: 227 10*3/MM3 (ref 140–450)
PMV BLD AUTO: 9.1 FL (ref 6–12)
POTASSIUM SERPL-SCNC: 4.1 MMOL/L (ref 3.5–5.2)
QT INTERVAL: 394 MS
QTC INTERVAL: 492 MS
RBC # BLD AUTO: 2.83 10*6/MM3 (ref 4.14–5.8)
SODIUM SERPL-SCNC: 133 MMOL/L (ref 136–145)
WBC NRBC COR # BLD AUTO: 23.95 10*3/MM3 (ref 3.4–10.8)

## 2024-05-26 PROCEDURE — 99222 1ST HOSP IP/OBS MODERATE 55: CPT | Performed by: INTERNAL MEDICINE

## 2024-05-26 PROCEDURE — 97530 THERAPEUTIC ACTIVITIES: CPT

## 2024-05-26 PROCEDURE — 85025 COMPLETE CBC W/AUTO DIFF WBC: CPT | Performed by: INTERNAL MEDICINE

## 2024-05-26 PROCEDURE — 97162 PT EVAL MOD COMPLEX 30 MIN: CPT

## 2024-05-26 PROCEDURE — G0378 HOSPITAL OBSERVATION PER HR: HCPCS

## 2024-05-26 PROCEDURE — 97110 THERAPEUTIC EXERCISES: CPT

## 2024-05-26 PROCEDURE — 25810000003 SODIUM CHLORIDE 0.9 % SOLUTION: Performed by: FAMILY MEDICINE

## 2024-05-26 PROCEDURE — 80069 RENAL FUNCTION PANEL: CPT | Performed by: INTERNAL MEDICINE

## 2024-05-26 PROCEDURE — 99233 SBSQ HOSP IP/OBS HIGH 50: CPT | Performed by: FAMILY MEDICINE

## 2024-05-26 PROCEDURE — 94799 UNLISTED PULMONARY SVC/PX: CPT

## 2024-05-26 PROCEDURE — 97165 OT EVAL LOW COMPLEX 30 MIN: CPT

## 2024-05-26 RX ORDER — FLUDROCORTISONE ACETATE 0.1 MG/1
50 TABLET ORAL DAILY
Status: DISCONTINUED | OUTPATIENT
Start: 2024-05-26 | End: 2024-05-31 | Stop reason: HOSPADM

## 2024-05-26 RX ORDER — FLUDROCORTISONE ACETATE 0.1 MG/1
100 TABLET ORAL DAILY
Status: CANCELLED | OUTPATIENT
Start: 2024-05-26

## 2024-05-26 RX ORDER — SODIUM CHLORIDE 9 MG/ML
100 INJECTION, SOLUTION INTRAVENOUS CONTINUOUS
Status: DISCONTINUED | OUTPATIENT
Start: 2024-05-26 | End: 2024-05-31 | Stop reason: HOSPADM

## 2024-05-26 RX ADMIN — SODIUM BICARBONATE 650 MG TABLET 1300 MG: at 08:34

## 2024-05-26 RX ADMIN — MIDODRINE HYDROCHLORIDE 10 MG: 10 TABLET ORAL at 04:35

## 2024-05-26 RX ADMIN — FLUDROCORTISONE ACETATE 50 MCG: 0.1 TABLET ORAL at 15:13

## 2024-05-26 RX ADMIN — FERROUS SULFATE TAB 325 MG (65 MG ELEMENTAL FE) 325 MG: 325 (65 FE) TAB at 08:34

## 2024-05-26 RX ADMIN — SODIUM BICARBONATE 650 MG TABLET 1300 MG: at 20:40

## 2024-05-26 RX ADMIN — MIDODRINE HYDROCHLORIDE 10 MG: 10 TABLET ORAL at 11:45

## 2024-05-26 RX ADMIN — PRAVASTATIN SODIUM 80 MG: 40 TABLET ORAL at 20:40

## 2024-05-26 RX ADMIN — TIOTROPIUM BROMIDE INHALATION SPRAY 2 PUFF: 3.12 SPRAY, METERED RESPIRATORY (INHALATION) at 07:43

## 2024-05-26 RX ADMIN — MEGESTROL ACETATE 20 MG: 20 TABLET ORAL at 08:34

## 2024-05-26 RX ADMIN — SODIUM BICARBONATE 650 MG TABLET 1300 MG: at 16:03

## 2024-05-26 RX ADMIN — BUDESONIDE AND FORMOTEROL FUMARATE DIHYDRATE 2 PUFF: 160; 4.5 AEROSOL RESPIRATORY (INHALATION) at 20:12

## 2024-05-26 RX ADMIN — VANCOMYCIN HYDROCHLORIDE 125 MG: 125 CAPSULE ORAL at 11:45

## 2024-05-26 RX ADMIN — VANCOMYCIN HYDROCHLORIDE 125 MG: 125 CAPSULE ORAL at 06:18

## 2024-05-26 RX ADMIN — BUDESONIDE AND FORMOTEROL FUMARATE DIHYDRATE 2 PUFF: 160; 4.5 AEROSOL RESPIRATORY (INHALATION) at 07:43

## 2024-05-26 RX ADMIN — VANCOMYCIN HYDROCHLORIDE 125 MG: 125 CAPSULE ORAL at 17:44

## 2024-05-26 RX ADMIN — SODIUM CHLORIDE 75 ML/HR: 9 INJECTION, SOLUTION INTRAVENOUS at 09:52

## 2024-05-26 RX ADMIN — VANCOMYCIN HYDROCHLORIDE 125 MG: 125 CAPSULE ORAL at 01:25

## 2024-05-26 NOTE — CONSULTS
Reason for Consultation: ARACELI and Hypotension     Subjective     Chief complaint Low BP     History of present illness:  This is 74-year-old male with history of ARACELI on CKD 4-5 , non-small cell CA s/p new adjuvant therapy, right lower lobe lobectomy January 2024, subsequent immunotherapy with Opdivo, CAD, hypertension, AV block s/p PPM, hypotension, emphysema, tobacco use brought to ED for hypotension and weakness.  He has multiple admissions over the last few months with similar complaints. On his last discharge Scr was 3.27 and ACTH stimulation test was found to be normal. He is here with Elevated Scr this time with Low BP  Nephrology service has been consulted for ARACELI/CKD management     History  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    Coronary artery disease 2019    Diabetes mellitus Borderline    Emphysema/COPD     Erectile disorder     GERD (gastroesophageal reflux disease)     History of chemotherapy     Hyperlipidemia     Hypertension 2019    Lung nodule     Mumps     Mumps     Pruritus     after bath    Slow to wake up after anesthesia     Stage 5 chronic kidney disease not on chronic dialysis 5/14/2024    Wears dentures     upper only    Wears hearing aid in both ears     usually only wears right   ,   Past Surgical History:   Procedure Laterality Date    BONE BIOPSY      broken bone surgery in his face    BRONCHOSCOPY THORACOTOMY Right 01/09/2024    Procedure: THORACOTOMY FOR LOWER LOBECTOMY AND MEDISTINAL LYMPH NODE DISSECTION RIGHT;  Surgeon: Joey Patel MD;  Location: [facility] OR;  Service: Cardiothoracic;  Laterality: Right;    BRONCHOSCOPY WITH ION ROBOTIC ASSIST N/A 09/15/2023    Procedure: BRONCHOSCOPY NAVIGATION WITH ENDOBRONCHIAL ULTRASOUND AND ION ROBOT;  Surgeon: Octaviano Sampson MD;  Location: [facility] ENDOSCOPY;  Service: Robotics - Pulmonary;  Laterality: N/A;  ion  #6 - 0032  - 0015  Cath guide 0061    EBUS balloon removed and intact    CARDIAC ELECTROPHYSIOLOGY PROCEDURE N/A 08/17/2021    Procedure: Pacemaker DC new;  Surgeon: Kayy Box MD;  Location: [facility] CATH INVASIVE LOCATION;  Service: Cardiology;  Laterality: N/A;    FACIAL FRACTURE SURGERY      LYMPH NODE BIOPSY  2023    PACEMAKER IMPLANTATION     ,   Family History   Problem Relation Age of Onset    Aneurysm Mother         brain    Dementia Father     Leukemia Sister     Heart disease Paternal Grandmother     Hypertension Paternal Grandfather     Cancer Sister    ,   Social History     Socioeconomic History    Marital status: Single    Number of children: 3   Tobacco Use    Smoking status: Every Day     Current packs/day: 0.50     Average packs/day: 0.5 packs/day for 56.4 years (28.7 ttl pk-yrs)     Types: Cigarettes     Start date: 1/1/1968    Smokeless tobacco: Never    Tobacco comments:     Still smoke   Vaping Use    Vaping status: Never Used   Substance and Sexual Activity    Alcohol use: Never    Drug use: Never    Sexual activity: Yes     Partners: Female     Birth control/protection: None     E-cigarette/Vaping    E-cigarette/Vaping Use Never User     Passive Exposure No     Counseling Given No      E-cigarette/Vaping Substances    Nicotine No     THC No     CBD No     Flavoring No      E-cigarette/Vaping Devices    Disposable No     Pre-filled or Refillable Cartridge No     Refillable Tank No     Pre-filled Pod No          ,   Medications Prior to Admission   Medication Sig Dispense Refill Last Dose    albuterol sulfate  (90 Base) MCG/ACT inhaler Inhale 2 puffs Every 4 (Four) Hours As Needed for Wheezing. 18 g 11 5/24/2024    B Complex Vitamins (vitamin b complex) capsule capsule Take 1 capsule by mouth Daily. OTC       ferrous sulfate 325 (65 FE) MG tablet Take 1 tablet by mouth Daily With Breakfast. OTC       fluticasone (VERAMYST) 27.5 MCG/SPRAY nasal spray 2 sprays into the  nostril(s) as directed by provider 1 (One) Time As Needed for Rhinitis or Allergies. 6 mL 2     Fluticasone-Umeclidin-Vilant (Trelegy Ellipta) 100-62.5-25 MCG/ACT inhaler Inhale 1 puff Daily. 3 each 3     HYDROcodone-acetaminophen (Norco) 7.5-325 MG per tablet Take 1 tablet by mouth Every 6 (Six) Hours As Needed for Moderate Pain. 60 tablet 0     lidocaine-prilocaine (EMLA) 2.5-2.5 % cream Apply 1 application  topically to the appropriate area as directed As Needed (45-60 minutes prior to port access.  Cover with saran/plastic wrap.). 30 g 3     megestrol (MEGACE) 20 MG tablet Take 1 tablet by mouth Daily for 7 days. 7 tablet 0     midodrine (PROAMATINE) 10 MG tablet Take 1 tablet by mouth 3 (Three) Times a Day As Needed (Systolic BP less then 100). 30 tablet 0     omeprazole (priLOSEC) 40 MG capsule Take 1 capsule by mouth Daily. 30 capsule 5     ondansetron (ZOFRAN) 8 MG tablet Take 1 tablet by mouth 3 (Three) Times a Day As Needed for Nausea or Vomiting. 30 tablet 2     pravastatin (PRAVACHOL) 80 MG tablet Take 1 tablet by mouth Every Night. 30 tablet 11     sodium bicarbonate 650 MG tablet Take 2 tablets by mouth 3 (Three) Times a Day for 30 days. 180 tablet 0     vancomycin (VANCOCIN) 125 MG capsule Take 1 capsule by mouth Every 6 (Six) Hours for 37 doses. Indications: Colon Inflammation due to Clostridium Bacteria Overgrowth 37 capsule 0    , Scheduled Meds:  budesonide-formoterol, 2 puff, Inhalation, BID - RT   And  tiotropium bromide monohydrate, 2 puff, Inhalation, Daily - RT  ferrous sulfate, 325 mg, Oral, Daily With Breakfast  megestrol, 20 mg, Oral, Daily  pravastatin, 80 mg, Oral, Nightly  sodium bicarbonate, 1,300 mg, Oral, TID  vancomycin, 125 mg, Oral, Q6H   , Continuous Infusions:  sodium chloride, 75 mL/hr, Last Rate: 75 mL/hr (05/26/24 0952)   , PRN Meds:    acetaminophen **OR** acetaminophen **OR** acetaminophen    albuterol sulfate HFA    midodrine    nitroglycerin, and Allergies:  Cymbalta  "[duloxetine hcl], Gabapentin, Remeron [mirtazapine], Toradol [ketorolac tromethamine], Latex, and Tape    Review of Systems  Pertinent items are noted in HPI    Objective     Vital Signs  Temp:  [97.8 °F (36.6 °C)-98.2 °F (36.8 °C)] 97.8 °F (36.6 °C)  Heart Rate:  [70-99] 77  Resp:  [16-19] 17  BP: ()/(55-75) 100/65    No intake/output data recorded.  I/O last 3 completed shifts:  In: 2125 [P.O.:1125; IV Piggyback:1000]  Out: 200 [Urine:200]    Physical Exam:  General Appearance:    Alert, cooperative, in no acute distress   Head:    Normocephalic, without obvious abnormality, atraumatic   Eyes:            Conjunctivae and sclerae normal, no   icterus, no pallor, corneas clear, PERRLA           Neck:   Supple, trachea midline, no thyromegaly,  no JVD       Lungs:     Clear to auscultation,respirations regular, even and               unlabored    Heart:    Regular rhythm and normal rate, normal S1 and S2, no       murmur, no gallop, no rub, no click       Abdomen:     Normal bowel sounds,soft, non-tender, non-distended, no guarding, no rebound tenderness       Extremities:   Moves all extremities well, no edema, no cyanosis, no         redness   Pulses:   Pulses palpable and equal bilaterally           Neurologic:   Cranial nerves 2 - 12 grossly intact, no focal deficit         Results Review:   I reviewed the patient's new clinical results.    WBC WBC   Date Value Ref Range Status   05/26/2024 23.95 (H) 3.40 - 10.80 10*3/mm3 Final   05/25/2024 30.99 (C) 3.40 - 10.80 10*3/mm3 Final      HGB Hemoglobin   Date Value Ref Range Status   05/26/2024 8.4 (L) 13.0 - 17.7 g/dL Final   05/25/2024 10.3 (L) 13.0 - 17.7 g/dL Final      HCT Hematocrit   Date Value Ref Range Status   05/26/2024 25.5 (L) 37.5 - 51.0 % Final   05/25/2024 31.6 (L) 37.5 - 51.0 % Final      Platlets No results found for: \"LABPLAT\"   MCV MCV   Date Value Ref Range Status   05/26/2024 90.1 79.0 - 97.0 fL Final   05/25/2024 88.5 79.0 - 97.0 fL Final " "         Sodium Sodium   Date Value Ref Range Status   05/26/2024 133 (L) 136 - 145 mmol/L Final   05/25/2024 135 (L) 136 - 145 mmol/L Final      Potassium Potassium   Date Value Ref Range Status   05/26/2024 4.1 3.5 - 5.2 mmol/L Final   05/25/2024 3.9 3.5 - 5.2 mmol/L Final     Comment:     Slight hemolysis detected by analyzer. Result may be falsely elevated.      Chloride Chloride   Date Value Ref Range Status   05/26/2024 100 98 - 107 mmol/L Final   05/25/2024 105 98 - 107 mmol/L Final      CO2 CO2   Date Value Ref Range Status   05/26/2024 21.0 (L) 22.0 - 29.0 mmol/L Final   05/25/2024 13.0 (L) 22.0 - 29.0 mmol/L Final      BUN BUN   Date Value Ref Range Status   05/26/2024 40 (H) 8 - 23 mg/dL Final   05/25/2024 28 (H) 8 - 23 mg/dL Final      Creatinine Creatinine   Date Value Ref Range Status   05/26/2024 5.94 (H) 0.76 - 1.27 mg/dL Final   05/25/2024 3.27 (H) 0.76 - 1.27 mg/dL Final      Calcium Calcium   Date Value Ref Range Status   05/26/2024 8.0 (L) 8.6 - 10.5 mg/dL Final   05/25/2024 8.4 (L) 8.6 - 10.5 mg/dL Final      PO4 No results found for: \"CAPO4\"   Albumin Albumin   Date Value Ref Range Status   05/26/2024 2.4 (L) 3.5 - 5.2 g/dL Final   05/25/2024 2.7 (L) 3.5 - 5.2 g/dL Final      Magnesium No results found for: \"MG\"   Uric Acid No results found for: \"URICACID\"         budesonide-formoterol, 2 puff, Inhalation, BID - RT   And  tiotropium bromide monohydrate, 2 puff, Inhalation, Daily - RT  ferrous sulfate, 325 mg, Oral, Daily With Breakfast  megestrol, 20 mg, Oral, Daily  pravastatin, 80 mg, Oral, Nightly  sodium bicarbonate, 1,300 mg, Oral, TID  vancomycin, 125 mg, Oral, Q6H      sodium chloride, 75 mL/hr, Last Rate: 75 mL/hr (05/26/24 9781)        Assessment & Plan       Hx of hypotension    Mixed hyperlipidemia    Centrilobular emphysema    Tobacco abuse    Malignant neoplasm of lower lobe of right lung    GERD without esophagitis    CAD (coronary artery disease)    Stage 4 chronic kidney " disease    Hypotension    Dehydration    C. difficile colitis    UTI (urinary tract infection)      1.  ARACELI on CKD stage IV - Scr 4.49- stable. creatinine peaked around 7.6 to last admission, patient went home with a creatinine slowly coming down to 5 range.  Serologic workup -ANCA (-),  2.  Proteinuria   3.  Hypotension - Cortisol level - 15.44 and ACTH pending. level 2 weeks back was 26.33 - Adrenal insufficiency reported with Opdivo in literature along with other endocrine abnormalities. Per wife, he has salt craving and weakness even before Opdivo. ACTH stimulation test result normal , renin viola level pending  4.  Hyponatremia    5. Anemia   6.  Metabolic acidosis.   7.  Infectious disease: Patient's followed with ID consultants.  8.  S/p immunotherapy for non-small cell cancer last infusion 4/4/2024 Opdivo.  9- 10- C diff colitis - on vancomycin.      Recommendation:  - IV fluids   - will start on Florinef 50 mg daily   - Continue with midodrine as needed   - Sodium bicarb tablets  - Monitor I/O   - Avoid nephrotoxic agents.   - Renal diet   - Adjust meds per renal function   - No emergent need of RRT   - Monitor H/H and transfuse for Hgb less than 7.0      High risk complex patient multiple medical problems.    I discussed the patients findings and my recommendations with patient, family, and nursing staff    Kurt Ríos MD  05/26/24

## 2024-05-26 NOTE — THERAPY EVALUATION
Patient Name: David Barfield  : 1950    MRN: [id number]                              Today's Date: 2024       Admit Date: 2024    Visit Dx:     ICD-10-CM ICD-9-CM   1. C. difficile colitis  A04.72 008.45   2. Leukocytosis, unspecified type  D72.829 288.60   3. Hypotension due to hypovolemia  E86.1 458.8     276.52     Patient Active Problem List   Diagnosis    High degree atrioventricular block    Bradycardia, sinus    Chronic hypotension    PVC's (premature ventricular contractions)    Presence of cardiac pacemaker    Mixed hyperlipidemia    Centrilobular emphysema    Nodule of lower lobe of right lung    Mediastinal adenopathy    Cough    Tobacco abuse    Bronchogenic cancer of right lung    Malignant neoplasm of lower lobe of right lung    Primary hypertension    GERD without esophagitis    Septic shock    Acute pyelonephritis    ARACELI (acute kidney injury)    Hypokalemia    Severe malnutrition    Leukocytosis    CAD (coronary artery disease)    Hypotension    Stage 4 chronic kidney disease    Moderate malnutrition    Weakness    Hypotension    Dehydration    C. difficile colitis    UTI (urinary tract infection)    Hx of hypotension     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    Coronary artery disease 2019    Diabetes mellitus Borderline    Emphysema/COPD     Erectile disorder     GERD (gastroesophageal reflux disease)     History of chemotherapy     Hyperlipidemia     Hypertension 2019    Lung nodule     Mumps     Mumps     Pruritus     after bath    Slow to wake up after anesthesia     Stage 5 chronic kidney disease not on chronic dialysis 2024    Wears dentures     upper only    Wears hearing aid in both ears     usually only wears right     Past Surgical History:   Procedure Laterality Date    BONE BIOPSY      broken bone surgery in his face    BRONCHOSCOPY THORACOTOMY  Right 01/09/2024    Procedure: THORACOTOMY FOR LOWER LOBECTOMY AND MEDISTINAL LYMPH NODE DISSECTION RIGHT;  Surgeon: Joey Patel MD;  Location: [facility] OR;  Service: Cardiothoracic;  Laterality: Right;    BRONCHOSCOPY WITH ION ROBOTIC ASSIST N/A 09/15/2023    Procedure: BRONCHOSCOPY NAVIGATION WITH ENDOBRONCHIAL ULTRASOUND AND ION ROBOT;  Surgeon: Octaviano Sampson MD;  Location: [facility] ENDOSCOPY;  Service: Robotics - Pulmonary;  Laterality: N/A;  ion #6 - 0032  - 0015  Cath guide 0061    EBUS balloon removed and intact    CARDIAC ELECTROPHYSIOLOGY PROCEDURE N/A 08/17/2021    Procedure: Pacemaker DC new;  Surgeon: Kayy Box MD;  Location: [facility] CATH INVASIVE LOCATION;  Service: Cardiology;  Laterality: N/A;    FACIAL FRACTURE SURGERY      LYMPH NODE BIOPSY  2023    PACEMAKER IMPLANTATION        General Information       Row Name 05/26/24 1331          OT Time and Intention    Document Type evaluation  -CS     Mode of Treatment occupational therapy  -CS       Row Name 05/26/24 1331          General Information    Patient Profile Reviewed yes  -CS     Prior Level of Function independent:;all household mobility;ADL's;driving  Prior to recent decline, no AD for mobillity, now using shower chair in walk-in shower.  -CS     Existing Precautions/Restrictions fall;orthostatic hypotension  -CS     Barriers to Rehab medically complex  -CS       Row Name 05/26/24 1331          Living Environment    People in Home significant other  -CS       Row Name 05/26/24 1331          Stairs Within Home, Primary    Stairs, Within Home, Primary 2 level home, stair lift to second level, remains on main floor for all needs  -CS       Row Name 05/26/24 1331          Cognition    Orientation Status (Cognition) oriented x 4  -CS       Row Name 05/26/24 1331          Safety Issues, Functional Mobility    Safety Issues Affecting Function (Mobility) safety precaution awareness;insight into deficits/self-awareness;safety  precautions follow-through/compliance  -CS     Impairments Affecting Function (Mobility) endurance/activity tolerance;strength  -CS     Comment, Safety Issues/Impairments (Mobility) symptomatic orthostatic hypotension  -CS               User Key  (r) = Recorded By, (t) = Taken By, (c) = Cosigned By      Initials Name Provider Type    CS Brandon Serrano, OT Occupational Therapist                     Mobility/ADL's       Row Name 05/26/24 1333          Bed Mobility    Bed Mobility supine-sit;scooting/bridging  -CS     Scooting/Bridging [place] (Bed Mobility) independent  -CS     Supine-Sit [place] (Bed Mobility) supervision  -CS     Assistive Device (Bed Mobility) bed rails;head of bed elevated  -CS     Comment, (Bed Mobility) appropriate sequencing intact, mild dizziness reported upon sitting  -CS       Row Name 05/26/24 1333          Transfers    Transfers sit-stand transfer;bed-chair transfer  -CS     Comment, (Transfers) steps to recliner, further mobility deferred due to symptatic orthostatic hypotension (117/71 <> 75/50)  -CS       Row Name 05/26/24 1333          Bed-Chair Transfer    Bed-Chair [place] (Transfers) contact guard  -CS       Row Name 05/26/24 1333          Sit-Stand Transfer    Sit-Stand [place] (Transfers) standby assist  -CS       Row Name 05/26/24 1333          Functional Mobility    Functional Mobility- Comment defer to PT for specifics  -CS     Patient was able to Ambulate yes  -CS       Row Name 05/26/24 1333          Activities of Daily Living    BADL Assessment/Intervention lower body dressing;grooming;feeding  -CS       Row Name 05/26/24 1333          Lower Body Dressing Assessment/Training    [place] Level (Lower Body Dressing) don;socks;contact guard assist  -CS     Position (Lower Body Dressing) edge of bed sitting  -CS     Comment, (Lower Body Dressing) dynamic balance EOB impacted by dizziness  -CS       Row Name 05/26/24 1333          Grooming  Assessment/Training    [place] Level (Grooming) grooming skills;set up  -     Position (Grooming) supported sitting  -       Row Name 05/26/24 1333          Self-Feeding Assessment/Training    [place] Level (Feeding) feeding skills;independent  -CS     Position (Self-Feeding) supported sitting  -CS               User Key  (r) = Recorded By, (t) = Taken By, (c) = Cosigned By      Initials Name Provider Type     Brandon Serrano OT Occupational Therapist                   Obj/Interventions       Row Name 05/26/24 1336          Sensory Assessment (Somatosensory)    Sensory Assessment (Somatosensory) UE sensation intact  -       Row Name 05/26/24 1336          Vision Assessment/Intervention    Visual Impairment/Limitations WFL  -       Row Name 05/26/24 1336          Range of Motion Comprehensive    General Range of Motion no range of motion deficits identified  -[facility] Name 05/26/24 1336          Strength Comprehensive (MMT)    General Manual Muscle Testing (MMT) Assessment upper extremity strength deficits identified  -     Comment, General Manual Muscle Testing (MMT) Assessment BUE grossly 4/5, generalized weakness  -       Row Name 05/26/24 1336          Motor Skills    Motor Skills coordination;functional endurance  -     Coordination bilateral;upper extremity;finger to nose;WFL  -CS     Therapeutic Exercise other (see comments)  BLE AROM warm-up prior to mobility - education on potential benefits of ther-ex prior to mobility to mitigate BP drop  -       Row Name 05/26/24 1336          Balance    Balance Assessment sitting static balance;sitting dynamic balance;standing static balance;standing dynamic balance  -CS     Static Sitting Balance independent  -CS     Dynamic Sitting Balance standby assist  -CS     Static Standing Balance standby assist  -CS     Dynamic Standing Balance contact guard  -CS     Position/Device Used, Standing Balance unsupported  -     Balance  Interventions sitting;sit to stand;standing;occupation based/functional task  -CS               User Key  (r) = Recorded By, (t) = Taken By, (c) = Cosigned By      Initials Name Provider Type    CS Brandon Serrano OT Occupational Therapist                   Goals/Plan       Row Name 05/26/24 1343          Transfer Goal 1 (OT)    Activity/Assistive Device (Transfer Goal 1, OT) bed-to-chair/chair-to-bed;toilet  -CS     [place] Level/Cues Needed (Transfer Goal 1, OT) modified independence  -CS     Time Frame (Transfer Goal 1, OT) short term goal (STG);3 days  -CS     Strategies/Barriers (Transfers Goal 1, OT) AD recs per PT  -CS       Row Name 05/26/24 1343          Dressing Goal 1 (OT)    Activity/Device (Dressing Goal 1, OT) lower body dressing  -CS     [place]/Cues Needed (Dressing Goal 1, OT) supervision required  -CS     Time Frame (Dressing Goal 1, OT) long term goal (LTG);10 days  -CS     Strategies/Barriers (Dressing Goal 1, OT) sit-stand vs. modified pending improved activity tolerance  -CS     Progress/Outcome (Dressing Goal 1, OT) new goal  -CS       Row Name 05/26/24 1343          Strength Goal 1 (OT)    Strength Goal 1 (OT) Increase gross BUE strength 1/2 muscle grade to promote increased safety/Ind with ADLs and related transfers  -CS     Time Frame (Strength Goal 1, OT) long term goal (LTG);10 days  -CS     Progress/Outcome (Strength Goal 1, OT) new goal  -CS       Row Name 05/26/24 1343          Therapy Assessment/Plan (OT)    Planned Therapy Interventions (OT) activity tolerance training;functional balance retraining;occupation/activity based interventions;strengthening exercise;transfer/mobility retraining;patient/caregiver education/training;adaptive equipment training  -CS               User Key  (r) = Recorded By, (t) = Taken By, (c) = Cosigned By      Initials Name Provider Type    CS Brandon Serrano OT Occupational Therapist                   Clinical Impression       Row Name  05/26/24 1330          Pain Assessment    Additional Documentation Pain Scale: FACES Pre/Post-Treatment (Group)  -CS       Row Name 05/26/24 5391          Pain Scale: FACES Pre/Post-Treatment    Pain: FACES Scale, Pretreatment 0-->no hurt  -CS     Posttreatment Pain Rating 0-->no hurt  -CS       Row Name 05/26/24 1099          Plan of Care Review    Plan of Care Reviewed With patient;significant other  -CS     Progress no change  -CS     Outcome Evaluation Baseline ADL completion and related mobillity limited by decreased activity tolerance and generalized weakness, warrants skilled IP OT services to promote return to PLOF. Dizziness and 42pt drop systolic BP upon standing. Rec d/c to home with assist and OP OT/PT for generalized strenghening/balance.  -CS       Row Name 05/26/24 7176          Therapy Assessment/Plan (OT)    Patient/Family Therapy Goal Statement (OT) return to [place] with ADLs and related mobility  -CS     Rehab Potential (OT) good, to achieve stated therapy goals  -CS     Criteria for Skilled Therapeutic Interventions Met (OT) yes;meets criteria;skilled treatment is necessary  -CS     Therapy Frequency (OT) daily  -CS     Predicted Duration of Therapy Intervention (OT) 3  -CS       Row Name 05/26/24 0041          Therapy Plan Review/Discharge Plan (OT)    Anticipated Discharge Disposition (OT) home with assist;home with outpatient therapy services  -       Row Name 05/26/24 9997          Vital Signs    Pre Systolic BP Rehab 117  -CS     Pre Treatment Diastolic BP 71  -CS     Intra Systolic BP Rehab 75  -CS     Intra Treatment Diastolic BP 50   RN notified  -CS     Post Systolic BP Rehab 95  -CS     Post Treatment Diastolic BP 66  -CS     O2 Delivery Pre Treatment room air  -CS     O2 Delivery Intra Treatment room air  -CS     O2 Delivery Post Treatment room air  -CS     Pre Patient Position Supine  -CS     Intra Patient Position Standing  -CS     Post Patient Position Sitting  -CS       Row  Name 05/26/24 1338          Positioning and Restraints    Pre-Treatment Position in bed  -CS     Post Treatment Position chair  -CS     In Chair notified nsg;sitting;reclined;call light within reach;encouraged to call for assist;exit alarm on;legs elevated;waffle cushion;with family/caregiver  -CS               User Key  (r) = Recorded By, (t) = Taken By, (c) = Cosigned By      Initials Name Provider Type    CS Brandon Serrano, OT Occupational Therapist                   Outcome Measures       Row Name 05/26/24 1347          How much help from another is currently needed...    Putting on and taking off regular lower body clothing? 3  -CS     Bathing (including washing, rinsing, and drying) 3  -CS     Toileting (which includes using toilet bed pan or urinal) 3  -CS     Putting on and taking off regular upper body clothing 4  -CS     Taking care of personal grooming (such as brushing teeth) 4  -CS     Eating meals 4  -CS     AM-PAC 6 Clicks Score (OT) 21  -CS       Row Name 05/26/24 0800          How much help from another person do you currently need...    Turning from your back to your side while in flat bed without using bedrails? 3  -PB     Moving from lying on back to sitting on the side of a flat bed without bedrails? 3  -PB     Moving to and from a bed to a chair (including a wheelchair)? 3  -PB     Standing up from a chair using your arms (e.g., wheelchair, bedside chair)? 3  -PB     Climbing 3-5 steps with a railing? 3  -PB     To walk in hospital room? 4  -PB     AM-PAC 6 Clicks Score (PT) 19  -PB     Highest Level of Mobility Goal 6 --> Walk 10 steps or more  -PB       Row Name 05/26/24 1347          Modified Vermillion Scale    Modified Vermillion Scale 2 - Slight disability.  Unable to carry out all previous activities but able to look after own affairs without assistance.  -CS       Row Name 05/26/24 1347          Functional Assessment    Outcome Measure Options AM-PAC 6 Clicks Daily Activity (OT);Modified  [place]  [facility]               User Key  (r) = Recorded By, (t) = Taken By, (c) = Cosigned By      Initials Name Provider Type    CS Brandon Serrano OT Occupational Therapist    Mabel Boggs, RN Registered Nurse                    Occupational Therapy Education       Title: PT OT SLP Therapies (Done)       Topic: Occupational Therapy (Done)       Point: ADL training (Done)       Description:   Instruct learner(s) on proper safety adaptation and remediation techniques during self care or transfers.   Instruct in proper use of assistive devices.                  Learning Progress Summary             Patient Acceptance, E,D, VU,DU by  at 5/26/2024 1354   Significant Other Acceptance, E,D, VU,DU by CS at 5/26/2024 1354                         Point: Home exercise program (Done)       Description:   Instruct learner(s) on appropriate technique for monitoring, assisting and/or progressing therapeutic exercises/activities.                  Learning Progress Summary             Patient Acceptance, E,D, VU,DU by  at 5/26/2024 1354   Significant Other Acceptance, E,D, VU,DU by CS at 5/26/2024 1354                         Point: Precautions (Done)       Description:   Instruct learner(s) on prescribed precautions during self-care and functional transfers.                  Learning Progress Summary             Patient Acceptance, E,D, VU,DU by  at 5/26/2024 1354   Significant Other Acceptance, E,D, VU,DU by CS at 5/26/2024 1354                         Point: Body mechanics (Done)       Description:   Instruct learner(s) on proper positioning and spine alignment during self-care, functional mobility activities and/or exercises.                  Learning Progress Summary             Patient Acceptance, E,D, VU,DU by  at 5/26/2024 1354   Significant Other Acceptance, E,D, VU,DU by  at 5/26/2024 1354                                         User Key       Initials Effective Dates Name Provider Type [facility]    CS  06/16/21 -  Brandon Serrano OT Occupational Therapist OT                  OT Recommendation and Plan  Planned Therapy Interventions (OT): activity tolerance training, functional balance retraining, occupation/activity based interventions, strengthening exercise, transfer/mobility retraining, patient/caregiver education/training, adaptive equipment training  Therapy Frequency (OT): daily  Plan of Care Review  Plan of Care Reviewed With: patient, significant other  Progress: no change  Outcome Evaluation: Baseline ADL completion and related mobillity limited by decreased activity tolerance and generalized weakness, warrants skilled IP OT services to promote return to PLOF. Dizziness and 42pt drop systolic BP upon standing. Rec d/c to home with assist and OP OT/PT for generalized strenghening/balance.     Time Calculation:   Evaluation Complexity (OT)  Review Occupational Profile/Medical/Therapy History Complexity: expanded/moderate complexity  Assessment, Occupational Performance/Identification of Deficit Complexity: 1-3 performance deficits  Clinical Decision Making Complexity (OT): problem focused assessment/low complexity  Overall Complexity of Evaluation (OT): low complexity     Time Calculation- OT       Row Name 05/26/24 1354             Time Calculation- OT    OT Start Time 1032  -CS      OT Received On 05/26/24  -CS      OT Goal Re-Cert Due Date 06/05/24  -CS         Untimed Charges    OT Eval/Re-eval Minutes 47  -CS         Total Minutes    Untimed Charges Total Minutes 47  -CS       Total Minutes 47  -CS                User Key  (r) = Recorded By, (t) = Taken By, (c) = Cosigned By      Initials Name Provider Type    CS Brandon Serrano, OT Occupational Therapist                  Therapy Charges for Today       Code Description Service Date Service Provider Modifiers Qty    [id number]  OT EVAL LOW COMPLEXITY 4 5/26/2024 Brandon Serrano OT GO 1                 Brandon Serrano OT  5/26/2024

## 2024-05-26 NOTE — PROGRESS NOTES
INFECTIOUS DISEASE f/u     David Barfield  1950  [id number]    Date of Consult: 5/25/2024    Admission Date: 5/25/2024      Requesting Provider: ANAMIKA Sr  Evaluating Physician: David Mistry MD    Reason for Consultation: Cdiff    History of present illness:    Patient is a 74 y.o. male , known to Dr. Derian Barry, with h/o COPD, NSCLC right lung/chemo/RLL lobectomy 1/2024/on Opdivo with last dose around 4/4/2024, T2DM, CAD, AV block/ppm, HTN, HLD, tobacco use, and CKD Stage V/not on dialysis who presented to [facility] ED on 5/25 with hypotension.  He has had multiple admissions over the last month. He had severe leukocytosis and hospitalized from 4/24 to 4/29.  He was started on Cefepime.  A Karius test from 4/26 was negative. He also had persistent diarrhea; however, his Cdiff and GI panel PCR tests were negative from 4/25.  He was admitted from 5/6-5/12 for bilateral pyelonephritis and continued on IV Cefepime with end date tentative for 6/3.  He was admitted to [facility] from 5/20-5/23 for acute renal failure and found to have Cdiff positive PCR and Antigen test.  Cefepime was stopped and he was discharged on oral Vancomycin for 2 weeks.      He was readmitted to [facility] on 5/25 for increased weakness along with nausea, vomiting, and decreased appetite.  He had hypotension at home that did not improve with midodrine.  He was brought to ED by EMS.  He denies fever or chills or worsening diarrhea.  He states that he has had no diarrhea since a couple of days ago.  He has had some dysuria and increased urinary frequency.  He is afebrile.  Pertinent labs were lactic acid 2.3, WBC 31,000 with 83% segs/9% bands, CPK 9, and creatinine 3.27 (baseline 2.03 to 2.4 and last creatinine on 3/22 was 3.86 after acute renal failure episode).  Blood cultures are pending.  A UA WBC was 21-50 with moderate LE and negative nitrite.  No imaging studies have been done on this visit.  He is currently on oral Vancomycin.   ID was asked to evaluate and manage his antibiotic therapy.     5/25/24; Covering for Dr. TAMARA Barry; diarrhea better; having semiformed bowel movements; afebrile, awake, alert, following commands.  Tolerating oral vancomycin    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    Coronary artery disease 2019    Diabetes mellitus Borderline    Emphysema/COPD     Erectile disorder     GERD (gastroesophageal reflux disease)     History of chemotherapy     Hyperlipidemia     Hypertension 2019    Lung nodule     Mumps     Mumps     Pruritus     after bath    Slow to wake up after anesthesia     Stage 5 chronic kidney disease not on chronic dialysis 5/14/2024    Wears dentures     upper only    Wears hearing aid in both ears     usually only wears right       Past Surgical History:   Procedure Laterality Date    BONE BIOPSY      broken bone surgery in his face    BRONCHOSCOPY THORACOTOMY Right 01/09/2024    Procedure: THORACOTOMY FOR LOWER LOBECTOMY AND MEDISTINAL LYMPH NODE DISSECTION RIGHT;  Surgeon: Joey Patel MD;  Location: [facility] OR;  Service: Cardiothoracic;  Laterality: Right;    BRONCHOSCOPY WITH ION ROBOTIC ASSIST N/A 09/15/2023    Procedure: BRONCHOSCOPY NAVIGATION WITH ENDOBRONCHIAL ULTRASOUND AND ION ROBOT;  Surgeon: Octaviano Sampson MD;  Location:  [facility];  Service: Robotics - Pulmonary;  Laterality: N/A;  ion #6 - 0032  - 0015  Cath guide 0061    EBUS balloon removed and intact    CARDIAC ELECTROPHYSIOLOGY PROCEDURE N/A 08/17/2021    Procedure: Pacemaker DC new;  Surgeon: Kayy Box MD;  Location:  [facility];  Service: Cardiology;  Laterality: N/A;    FACIAL FRACTURE SURGERY      LYMPH NODE BIOPSY  2023    PACEMAKER IMPLANTATION         Family History   Problem Relation Age of Onset    Aneurysm Mother         brain    Dementia Father     Leukemia  "Sister     Heart disease Paternal Grandmother     Hypertension Paternal Grandfather     Cancer Sister        Social History     Socioeconomic History    Marital status: Single    Number of children: 3   Tobacco Use    Smoking status: Every Day     Current packs/day: 0.50     Average packs/day: 0.5 packs/day for 56.4 years (28.7 ttl pk-yrs)     Types: Cigarettes     Start date: 1/1/1968    Smokeless tobacco: Never    Tobacco comments:     Still smoke   Vaping Use    Vaping status: Never Used   Substance and Sexual Activity    Alcohol use: Never    Drug use: Never    Sexual activity: Yes     Partners: Female     Birth control/protection: None       Allergies   Allergen Reactions    Cymbalta [Duloxetine Hcl] GI Intolerance    Gabapentin Mental Status Change     Pt states that this medication \"makes him feel foolish in his head\".     Remeron [Mirtazapine] Other (See Comments)     Excess sedation    Toradol [Ketorolac Tromethamine] GI Intolerance     Projectile vomiting     Latex Other (See Comments)     Latex allergy     Tape Rash         Medication:    Current Facility-Administered Medications:     acetaminophen (TYLENOL) tablet 650 mg, 650 mg, Oral, Q4H PRN **OR** acetaminophen (TYLENOL) 160 MG/5ML oral solution 650 mg, 650 mg, Oral, Q4H PRN **OR** acetaminophen (TYLENOL) suppository 650 mg, 650 mg, Rectal, Q4H PRN, Bryson, Marisabel, APRN    albuterol sulfate HFA (PROVENTIL HFA;VENTOLIN HFA;PROAIR HFA) inhaler 2 puff, 2 puff, Inhalation, Q4H PRN, Bryson, Marisabel, APRN    budesonide-formoterol (SYMBICORT) 160-4.5 MCG/ACT inhaler 2 puff, 2 puff, Inhalation, BID - RT, 2 puff at 05/26/24 0743 **AND** tiotropium (SPIRIVA RESPIMAT) 2.5 mcg/act aerosol solution inhaler, 2 puff, Inhalation, Daily - RT, Bryson, Marisabel, APRN, 2 puff at 05/26/24 0743    ferrous sulfate tablet 325 mg, 325 mg, Oral, Daily With Breakfast, Bryson, Marisabel, APRN, 325 mg at 05/26/24 0834    megestrol (MEGACE) tablet 20 mg, 20 mg, Oral, Daily, " Bryson, Marisabel, APRN, 20 mg at 24 0834    midodrine (PROAMATINE) tablet 10 mg, 10 mg, Oral, TID PRN, Bryson, Marisabel, APRN, 10 mg at 24 0435    nitroglycerin (NITROSTAT) SL tablet 0.4 mg, 0.4 mg, Sublingual, Q5 Min PRN, Bryson, Marisabel, APRN    pravastatin (PRAVACHOL) tablet 80 mg, 80 mg, Oral, Nightly, Bryson, Marisabel, APRN, 80 mg at 24 2039    sodium bicarbonate tablet 1,300 mg, 1,300 mg, Oral, TID, Bryson, Marisabel, APRN, 1,300 mg at 24 0834    sodium chloride 0.9 % infusion, 75 mL/hr, Intravenous, Continuous, Rosalind Campa MD, Last Rate: 75 mL/hr at 24 0952, 75 mL/hr at 24 0952    vancomycin (VANCOCIN) capsule 125 mg, 125 mg, Oral, Q6H, Bryson, Marisabel, APRN, 125 mg at 24 0618    Antibiotics:  Anti-Infectives (From admission, onward)      Ordered     Dose/Rate Route Frequency Start Stop    24 0908  vancomycin (VANCOCIN) capsule 125 mg        Ordering Provider: Bryson, Marisabel, APRN    125 mg Oral Every 6 Hours Scheduled 24 1200 24 1159              Review of Systems:  See hpi      Physical Exam:   Vital Signs  Temp (24hrs), Av.1 °F (36.7 °C), Min:97.8 °F (36.6 °C), Max:98.5 °F (36.9 °C)    Temp  Min: 97.8 °F (36.6 °C)  Max: 98.5 °F (36.9 °C)  BP  Min: 90/55  Max: 126/67  Pulse  Min: 70  Max: 99  Resp  Min: 16  Max: 18  SpO2  Min: 95 %  Max: 99 %    GENERAL: Awake and alert, in no acute distress.   HEENT: Normocephalic, atraumatic.  PERRL. EOMI. No conjunctival injection. No icterus. No ext oral lesions    HEART: RRR; No murmur, rubs, gallops.   LUNGS: Clear to auscultation bilaterally   ABDOMEN: Soft, nontender, nondistended.   EXT:  No cyanosis, clubbing or edema. No cord.  :  Without Yeung catheter.  MSK: No joint effusions or erythema  SKIN: Warm and dry without cutaneous eruptions on Inspection/palpation.    NEURO: Oriented to PPT.  Motor 5/5 strength  PSYCHIATRIC: Normal insight and judgment. Cooperative with PE    Laboratory  Data    Results from last 7 days   Lab Units 05/26/24  0509 05/25/24  0343 05/23/24  0715   WBC 10*3/mm3 23.95* 30.99* 15.69*   HEMOGLOBIN g/dL 8.4* 10.3* 9.0*   HEMATOCRIT % 25.5* 31.6* 27.6*   PLATELETS 10*3/mm3 227 294 269     Results from last 7 days   Lab Units 05/26/24  0509   SODIUM mmol/L 133*   POTASSIUM mmol/L 4.1   CHLORIDE mmol/L 100   CO2 mmol/L 21.0*   BUN mg/dL 40*   CREATININE mg/dL 5.94*   GLUCOSE mg/dL 95   CALCIUM mg/dL 8.0*     Results from last 7 days   Lab Units 05/25/24  0343   ALK PHOS U/L 108   BILIRUBIN mg/dL 0.3   ALT (SGPT) U/L 22   AST (SGOT) U/L 25             Results from last 7 days   Lab Units 05/25/24  0718   LACTATE mmol/L 2.1*     Results from last 7 days   Lab Units 05/25/24  0343   CK TOTAL U/L 9*         Estimated Creatinine Clearance: 11.9 mL/min (A) (by C-G formula based on SCr of 5.94 mg/dL (H)).      Microbiology:  Blood cultures pending.   Urine culture pending            Radiology:  Imaging Results (Last 72 Hours)       Procedure Component Value Units Date/Time    CT Abdomen Pelvis Without Contrast [[id number]] Collected: 05/25/24 1822     Updated: 05/25/24 1830    Narrative:      CT ABDOMEN PELVIS WO CONTRAST    Date of Exam: 5/25/2024 3:09 PM EDT    Indication: recent c diff, n/v. elevated wbc.    Comparison: 5/19/2024    Technique: Axial CT images were obtained of the abdomen and pelvis without the administration of contrast. Reconstructed coronal and sagittal images were also obtained. Automated exposure control and iterative construction methods were used.      Findings:  Limited views of the lung bases demonstrates mild groundglass opacity in the left lung base as well as residual right base effusion.    The liver is unremarkable. The gallbladder is normal. The pancreas is normal. Spleen is normal.    Bilateral adrenal glands are normal. Bilateral kidneys are normal. The bladder is unremarkable.    Sigmoid colon and rectum are unremarkable. The descending colon is  unremarkable. There is wall thickening and inflammatory changes surrounding the a sending colon. The small bowel is unremarkable. No evidence of abscess is identified. The appendix is   normal. The small bowel is unremarkable. The stomach is unremarkable.    Prominent vascular calcifications. No adenopathy is identified. There is stranding within the pararenal fat as well as in the mesentery and along the paracolic gutters. This may be secondary to anasarca. No aggressive appearing lytic or sclerotic bone   lesions.      Impression:      Impression:    1. Persistent right effusion.  2. Increased inflammatory changes involving the predominantly ascending colon. Findings are most consistent with colitis.  2. Increased edema within the fat likely reflects anasarca.            Electronically Signed: Micha Millan MD    5/25/2024 6:27 PM EDT    Workstation ID: [id number]              Impression:   - hypotension, leukocytosis compatible with adrenal insuffiency  -   - Recent bilateral pyelonephritis/treated with Cefepime  - Recent Cdiff colitis with diarrhea  - Marked leukocytosis/neutrophilia  - Lactic acidosis  - Hypotension/responding to fluid resuscitation  - Acute on chronic renal failure Stage V/no dialysis.  Improving from last couple of hospitalizations.  Baseline around 2.0-2.4.  - Non small cell lung cancer s/p RLL lobectomy/s/p chemotherapy  - Chronic obstructive pulmonary disease with ongoing tobacco abuse  - Type 2 diabetes mellitus  - AV block/ppm  - Essential hypertension      PLAN/RECOMMENDATIONS:   Thank you for asking us to see David Barfield, I recommend the following:   Very challenging case.  Patient's hypotension, leukocytosis, lactic acidosis overall concerning I wonder if patient has adrenal insufficiency and hypotension could be aggravating chronic renal disease.  Hypotension can also contribute to bowel ischemia which could lead to a secondary infection.    I    .From a C. difficile standpoint  I think oral vancomycin is adequate.  If patient has adrenal insufficiency patient may require stress dose steroids to maintain adequate blood pressure and perfuse other organs.  I have reviewed the urinalysis which is inflammatory if we choose an antibiotic to cover for a genitourinary process we may choose eravacycline which has good activity against C. difficile colitis as well as gram-negative enteric's I discussed the case with family, Dr. Campa at length will defer to their recommendations on empiric steroids which may help with blood pressure and treat for adrenal insufficiency      David Mistry MD  5/26/2024  11:40 EDT

## 2024-05-26 NOTE — THERAPY EVALUATION
Patient Name: David Barfield  : 1950    MRN: [id number]                              Today's Date: 2024       Admit Date: 2024    Visit Dx:     ICD-10-CM ICD-9-CM   1. C. difficile colitis  A04.72 008.45   2. Leukocytosis, unspecified type  D72.829 288.60   3. Hypotension due to hypovolemia  E86.1 458.8     276.52     Patient Active Problem List   Diagnosis    High degree atrioventricular block    Bradycardia, sinus    Chronic hypotension    PVC's (premature ventricular contractions)    Presence of cardiac pacemaker    Mixed hyperlipidemia    Centrilobular emphysema    Nodule of lower lobe of right lung    Mediastinal adenopathy    Cough    Tobacco abuse    Bronchogenic cancer of right lung    Malignant neoplasm of lower lobe of right lung    Primary hypertension    GERD without esophagitis    Septic shock    Acute pyelonephritis    ARACELI (acute kidney injury)    Hypokalemia    Severe malnutrition    Leukocytosis    CAD (coronary artery disease)    Hypotension    Stage 4 chronic kidney disease    Moderate malnutrition    Weakness    Hypotension    Dehydration    C. difficile colitis    UTI (urinary tract infection)    Hx of hypotension     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    Coronary artery disease 2019    Diabetes mellitus Borderline    Emphysema/COPD     Erectile disorder     GERD (gastroesophageal reflux disease)     History of chemotherapy     Hyperlipidemia     Hypertension 2019    Lung nodule     Mumps     Mumps     Pruritus     after bath    Slow to wake up after anesthesia     Stage 5 chronic kidney disease not on chronic dialysis 2024    Wears dentures     upper only    Wears hearing aid in both ears     usually only wears right     Past Surgical History:   Procedure Laterality Date    BONE BIOPSY      broken bone surgery in his face    BRONCHOSCOPY THORACOTOMY  Right 01/09/2024    Procedure: THORACOTOMY FOR LOWER LOBECTOMY AND MEDISTINAL LYMPH NODE DISSECTION RIGHT;  Surgeon: Joey Patel MD;  Location:  [facility];  Service: Cardiothoracic;  Laterality: Right;    BRONCHOSCOPY WITH ION ROBOTIC ASSIST N/A 09/15/2023    Procedure: BRONCHOSCOPY NAVIGATION WITH ENDOBRONCHIAL ULTRASOUND AND ION ROBOT;  Surgeon: Octaviano Sampson MD;  Location: [facility] ENDOSCOPY;  Service: Robotics - Pulmonary;  Laterality: N/A;  ion #6 - [id number]  - 0015  Cath guide 0061    EBUS balloon removed and intact    CARDIAC ELECTROPHYSIOLOGY PROCEDURE N/A 08/17/2021    Procedure: Pacemaker DC new;  Surgeon: Kayy Box MD;  Location:  [facility];  Service: Cardiology;  Laterality: N/A;    FACIAL FRACTURE SURGERY      LYMPH NODE BIOPSY  2023    PACEMAKER IMPLANTATION        General Information       Row Name 05/26/24 1434          Physical Therapy Time and Intention    Document Type evaluation  -DM     Mode of Treatment physical therapy  -DM       Row Name 05/26/24 1434          General Information    Patient Profile Reviewed yes  -DM     Prior Level of Function independent:;all household mobility;gait;transfer;bed mobility;ADL's;driving  pt was indep gt w/o AD p/t recent decline; now uses Sh.chair in [place]. & hasn't driven recently;has stair lift to 2nd fl.; lives w/ female  who has been doing HM, cooking  -DM     Existing Precautions/Restrictions fall;orthostatic hypotension;other (see comments)  Cdiff (contact); sympt.orthostat hypot.(monitor orthostat BP's); low HGB 8.4 (Decr from 10.3 yest.);1-9-24: R thor./RLL Lobect.& lymph node diss. (persist. R effus.);  -DM     Barriers to Rehab medically complex;ineffective coping  takes med.to prevent Dep.  -DM       Row Name 05/26/24 1434          Living Environment    People in Home significant other  per OT, female friend lives w/ pt.  -DM       Row Name 05/26/24 1434          Home Main Entrance    Number of  Stairs, Main Entrance one  -DM     Stair Railings, Main Entrance railings safe and in good condition  -DM       Row Name 05/26/24 1434          Stairs Within Home, Primary    Stairs, Within Home, Primary 2-st. home (stair lift to 2nd fl.;pt uses 1st fl. B/B)  -DM     Number of Stairs, Within Home, Primary other (see comments)  -DM       Row Name 05/26/24 1434          Cognition    Orientation Status (Cognition) oriented x 4  -DM       Row Name 05/26/24 1434          Safety Issues, Functional Mobility    Safety Issues Affecting Function (Mobility) safety precaution awareness;safety precautions follow-through/compliance;sequencing abilities;insight into deficits/self-awareness;awareness of need for assistance  -DM     Impairments Affecting Function (Mobility) endurance/activity tolerance;postural/trunk control;shortness of breath;strength  -DM               User Key  (r) = Recorded By, (t) = Taken By, (c) = Cosigned By      Initials Name Provider Type    DM Michelle Claros, PT Physical Therapist                   Mobility       Row Name 05/26/24 1434          Bed Mobility    Bed Mobility rolling left;rolling right;scooting/bridging;supine-sit;sit-supine  -DM     Rolling Left Wheeler (Bed Mobility) verbal cues;modified independence  -DM     Rolling Right Wheeler (Bed Mobility) verbal cues;modified independence  -DM     Scooting/Bridging Wheeler (Bed Mobility) verbal cues;supervision  pt able to bridge w/ hips to midline,then scooted to HOB X 2  -DM     Supine-Sit Wheeler (Bed Mobility) verbal cues;supervision  -DM     Sit-Supine Wheeler (Bed Mobility) verbal cues;supervision  -DM     Assistive Device (Bed Mobility) bed rails;head of bed elevated  -DM     Comment, (Bed Mobility) MD assessing pt in room for extended time while PT writing exer program, then nsg brought C diff med, but needed pill cutter, & delay while awaiting delivery,then administered to pt; req. PT assess orthostat BP's during  session; Clin. dietician asked PT to give Care nutrition document to pt to read & explain that dietary would call pt, this afternoon, & dietician would return 5-27; pt's family stating pt has been up to BSC w/ assist of S.O. & that nsg is leaving both exit alarms off while family present;pt instructed in PLB Exer & performed w/ mod cueing(reverts to mouth breathing freq); orthostat BP taken sup & sitting EOB;No c/o dizziness or signs of sympt ortho hypot  -DM       Row Name 05/26/24 1434          Transfers    Comment, (Transfers) Decl. transf to chair (just got back to bed) or BSC (Had BM earlier;remains in Cdiff prec.); fredis EOB sitting for 5 min, while performing LE ther ex, trunk ext/posture correction, & 2 BP readings taken;ret. to sup. & scooted to HOB  -DM       Row Name 05/26/24 1434          Bed-Chair Transfer    Bed-Chair [place] (Transfers) unable to assess  -DM       Row Name 05/26/24 1434          Sit-Stand Transfer    Sit-Stand [place] (Transfers) unable to assess  -DM       Row Name 05/26/24 1434          Gait/Stairs (Locomotion)    [place] Level (Gait) unable to assess  -DM               User Key  (r) = Recorded By, (t) = Taken By, (c) = Cosigned By      Initials Name Provider Type    Michelle Jc, PT Physical Therapist                   Obj/Interventions       Row Name 05/26/24 1434          Range of Motion Comprehensive    General Range of Motion bilateral lower extremity ROM WFL  -DM       Row Name 05/26/24 1434          Strength Comprehensive (MMT)    General Manual Muscle Testing (MMT) Assessment lower extremity strength deficits identified  -DM     Comment, General Manual Muscle Testing (MMT) Assessment B hips/ quads grossly 4 to 4+/5  -DM       Row Name 05/26/24 1434          Motor Skills    Therapeutic Exercise shoulder;knee;hip;ankle  issued HEP & Instructed  -DM       Row Name 05/26/24 1434          Shoulder (Therapeutic Exercise)    Shoulder (Therapeutic Exercise) AROM  "(active range of motion)  -DM     Shoulder AROM (Therapeutic Exercise) bilateral;flexion;extension;aBduction;aDduction;horizontal aBduction/aDduction;10 repetitions;supine;other (see comments)  deep inspirations w/ sh. exer;Ion biceps curls;gentle \"punches\" toward ceiling  -DM       Row Name 05/26/24 1434          Hip (Therapeutic Exercise)    Hip (Therapeutic Exercise) AROM (active range of motion);isometric exercises  -DM     Hip AROM (Therapeutic Exercise) bilateral;flexion;extension;aBduction;aDduction;external rotation;internal rotation;sitting;supine;10 repetitions;other (see comments)  bridging x 3; sitting Marches; sup.hip & knee F/E in sup.  -DM     Hip Isometrics (Therapeutic Exercise) bilateral;gluteal sets;supine;10 repetitions  -DM       Row Name 05/26/24 1434          Knee (Therapeutic Exercise)    Knee (Therapeutic Exercise) AROM (active range of motion);isometric exercises  -DM     Knee AROM (Therapeutic Exercise) bilateral;flexion;extension;SAQ (short arc quad);SLR (straight leg raise);LAQ (long arc quad);heel slides;sitting;supine;10 repetitions  -DM     Knee Isometrics (Therapeutic Exercise) bilateral;hamstring sets;quad sets;supine;10 repetitions  -DM       Row Name 05/26/24 1434          Ankle (Therapeutic Exercise)    Ankle (Therapeutic Exercise) AROM (active range of motion)  -DM     Ankle AROM (Therapeutic Exercise) bilateral;dorsiflexion;plantarflexion;sitting;supine;10 repetitions;2 sets;other (see comments)  AP & AC x 20 (sup.& sit)  -DM       Row Name 05/26/24 1434          Balance    Balance Assessment sitting static balance;sitting dynamic balance  -DM     Static Sitting Balance independent  -DM     Dynamic Sitting Balance supervision  recip scooting  -DM     Position, Sitting Balance unsupported;sitting edge of bed  -DM     Balance Interventions sitting;static;dynamic;weight shifting activity  -DM               User Key  (r) = Recorded By, (t) = Taken By, (c) = Cosigned By      " Initials Name Provider Type    DM Michelle Claros, PT Physical Therapist                   Goals/Plan       Row Name 05/26/24 1434          Bed Mobility Goal 1 (PT)    Activity/Assistive Device (Bed Mobility Goal 1, PT) bed mobility activities, all  -DM     [place]ris Level/Cues Needed (Bed Mobility Goal 1, PT) independent  -DM     Time Frame (Bed Mobility Goal 1, PT) long term goal (LTG);1 week  -DM       Row Name 05/26/24 1434          Transfer Goal 1 (PT)    Activity/Assistive Device (Transfer Goal 1, PT) sit-to-stand/stand-to-sit;bed-to-chair/chair-to-bed  -DM     [place]ris Level/Cues Needed (Transfer Goal 1, PT) standby assist  -DM     Time Frame (Transfer Goal 1, PT) long term goal (LTG);1 week  -DM       Row Name 05/26/24 1434          Gait Training Goal 1 (PT)    Activity/Assistive Device (Gait Training Goal 1, PT) gait (walking locomotion)  stable VS; o2 Sat > 92% & no sympt.orthostat BP changes  -DM     [place]ris Level (Gait Training Goal 1, PT) contact guard required  -DM     Distance (Gait Training Goal 1, PT) 50  -DM     Time Frame (Gait Training Goal 1, PT) long term goal (LTG);1 week  -DM       Row Name 05/26/24 1434          Stairs Goal 1 (PT)    Activity/Assistive Device (Stairs Goal 1, PT) ascending stairs;descending stairs  -DM     [place]ris Level/Cues Needed (Stairs Goal 1, PT) minimum assist (75% or more patient effort)  -DM     Number of Stairs (Stairs Goal 1, PT) 1  -DM     Time Frame (Stairs Goal 1, PT) long term goal (LTG);1 week  -DM       Row Name 05/26/24 1434          Patient Education Goal (PT)    Activity (Patient Education Goal, PT) HEP exer  -DM     [place]ris/Cues/Accuracy (Memory Goal 2, PT) demonstrates adequately;verbalizes understanding  -DM     Time Frame (Patient Education Goal, PT) long term goal (LTG);1 week  -DM     Progress/Outcome (Patient Education Goal, PT) goal partially met  -DM       Row Name 05/26/24 1434          Therapy Assessment/Plan (PT)     Planned Therapy Interventions (PT) bed mobility training;gait training;home exercise program;patient/family education;stair training;strengthening;transfer training  -DM               User Key  (r) = Recorded By, (t) = Taken By, (c) = Cosigned By      Initials Name Provider Type    Michelle Jc, PT Physical Therapist                   Clinical Impression       Row Name 05/26/24 1434          Pain    Pretreatment Pain Rating 0/10 - no pain  -DM     Posttreatment Pain Rating 0/10 - no pain  -DM     Additional Documentation Pain Scale: Numbers Pre/Post-Treatment (Group)  -DM       Row Name 05/26/24 1434          Plan of Care Review    Plan of Care Reviewed With patient;family;significant other  -DM     Progress improving  -DM     Outcome Evaluation PT eval completed. Presents w/ CDiff, sympt orthostat hypot, low HGB 8.4 (decr from 10.3 yest), recent R thoracot/RLL resect. 1/'24, persist. R pleur.eff., decr LE strength/endurance & impaired funct mobil below baseline. Performed rolling L/R & transf to EOB w/ supv, then ther. exer sup. & EOB per issued HEP, & sit bal. activ & PLB exer. x 5 min (, & 2 sitting BP's 113/74 & 101/74, w/ no orthostat sympt. noted), but def. standing d/t just recently UIC & ret. to bed (Dizzy while standing w/ OT). Recommend home w/ family's assist and OPPT f/u at d/c.  -DM       Row Name 05/26/24 1434          Therapy Assessment/Plan (PT)    Patient/Family Therapy Goals Statement (PT) improved funct mobil  -DM     Rehab Potential (PT) good, to achieve stated therapy goals  -DM     Criteria for Skilled Interventions Met (PT) yes;meets criteria;skilled treatment is necessary  -DM     Therapy Frequency (PT) daily  -DM       Row Name 05/26/24 1434          Vital Signs    Pre Systolic BP Rehab 113  -DM     Pre Treatment Diastolic BP 74  -DM     Intra Systolic BP Rehab 118  -DM     Intra Treatment Diastolic BP 69  -DM     Post Systolic BP Rehab 161  -DM     Post Treatment Diastolic BP  83  -DM     Pretreatment Heart Rate (beats/min) 70  -DM     Intratreatment Heart Rate (beats/min) 124  -DM     Posttreatment Heart Rate (beats/min) 71  -DM     Pre SpO2 (%) 98  -DM     O2 Delivery Pre Treatment room air  -DM     Intra SpO2 (%) 96  -DM     O2 Delivery Intra Treatment room air  -DM     Post SpO2 (%) 97  -DM     O2 Delivery Post Treatment room air  -DM     Pre Patient Position Supine  -DM     Intra Patient Position Sitting  -DM     Post Patient Position Supine  -DM       Row Name 05/26/24 1434          Positioning and Restraints    Pre-Treatment Position in bed  -DM     Post Treatment Position bed  -DM     In Bed notified nsg;supine;call light within reach;encouraged to call for assist;exit alarm on;with family/caregiver;with other staff;heels elevated  init had both exit alarms activated, but S.O.asked to have them turned off while she is present,since she gets pt up to BSC w/o calling for staff assist, & nsg agreed to this  -DM               User Key  (r) = Recorded By, (t) = Taken By, (c) = Cosigned By      Initials Name Provider Type    Michelle Jc, PT Physical Therapist                   Outcome Measures       Row Name 05/26/24 1434 05/26/24 0800       How much help from another person do you currently need...    Turning from your back to your side while in flat bed without using bedrails? 4  -DM 3  -PB    Moving from lying on back to sitting on the side of a flat bed without bedrails? 3  -DM 3  -PB    Moving to and from a bed to a chair (including a wheelchair)? 3  -DM 3  -PB    Standing up from a chair using your arms (e.g., wheelchair, bedside chair)? 3  -DM 3  -PB    Climbing 3-5 steps with a railing? 3  -DM 3  -PB    To walk in hospital room? 3  -DM 4  -PB    AM-PAC 6 Clicks Score (PT) 19  -DM 19  -PB    Highest Level of Mobility Goal 6 --> Walk 10 steps or more  -DM 6 --> Walk 10 steps or more  -PB      Row Name 05/26/24 1434 05/26/24 1347       Modified Thorndike Scale    Pre-Stroke  Modified Pollard Scale 0 - No Symptoms at all.  -DM --    Modified Pollard Scale 2 - Slight disability.  Unable to carry out all previous activities but able to look after own affairs without assistance.  -DM 2 - Slight disability.  Unable to carry out all previous activities but able to look after own affairs without assistance.  -      Row Name 05/26/24 1434 05/26/24 1347       Functional Assessment    Outcome Measure Options AM-PAC 6 Clicks Basic Mobility (PT)  -DM AM-PAC 6 Clicks Daily Activity (OT);Modified Pollard  -CS              User Key  (r) = Recorded By, (t) = Taken By, (c) = Cosigned By      Initials Name Provider Type    DM Michelle Claros, PT Physical Therapist    Brandon Bernabe, OT Occupational Therapist    Mabel Boggs, RN Registered Nurse                                 Physical Therapy Education       Title: PT OT SLP Therapies (In Progress)       Topic: Physical Therapy (In Progress)       Point: Mobility training (In Progress)       Learning Progress Summary             Patient Acceptance, E,D,H, NR by DM at 5/26/2024 1621   Family Acceptance, E,D,H, NR by DM at 5/26/2024 1621   Significant Other Acceptance, E,D,H, NR by DM at 5/26/2024 1621                         Point: Home exercise program (In Progress)       Learning Progress Summary             Patient Acceptance, E,D,H, NR by DM at 5/26/2024 1621   Family Acceptance, E,D,H, NR by DM at 5/26/2024 1621   Significant Other Acceptance, E,D,H, NR by DM at 5/26/2024 1621                         Point: Body mechanics (In Progress)       Learning Progress Summary             Patient Acceptance, E,D,H, NR by DM at 5/26/2024 1621   Family Acceptance, E,D,H, NR by DM at 5/26/2024 1621   Significant Other Acceptance, E,D,H, NR by DM at 5/26/2024 1621                         Point: Precautions (In Progress)       Learning Progress Summary             Patient Acceptance, E,D,H, NR by DM at 5/26/2024 1621   Family Acceptance, E,D,H, NR by DM  at 5/26/2024 1621   Significant Other Acceptance, E,D,H, NR by DM at 5/26/2024 1621                                         User Key       Initials Effective Dates Name Provider Type Discipline    DM 02/03/23 -  Michelle Claros, PT Physical Therapist PT                  PT Recommendation and Plan  Planned Therapy Interventions (PT): bed mobility training, gait training, home exercise program, patient/family education, stair training, strengthening, transfer training  Plan of Care Reviewed With: patient, family, significant other  Progress: improving  Outcome Evaluation: PT eval completed. Presents w/ CDiff, sympt orthostat hypot, low HGB 8.4 (decr from 10.3 yest), recent R thoracot/RLL resect. 1/'24, persist. R pleur.eff., decr LE strength/endurance & impaired funct mobil below baseline. Performed rolling L/R & transf to EOB w/ supv, then ther. exer sup. & EOB per issued HEP, & sit bal. activ & PLB exer. x 5 min (, & 2 sitting BP's 113/74 & 101/74, w/ no orthostat sympt. noted), but def. standing d/t just recently UIC & ret. to bed (Dizzy while standing w/ OT). Recommend home w/ family's assist and OPPT f/u at d/c.     Time Calculation:   PT Evaluation Complexity  History, PT Evaluation Complexity: 3 or more personal factors and/or comorbidities  Examination of Body Systems (PT Eval Complexity): total of 3 or more elements  Clinical Presentation (PT Evaluation Complexity): evolving  Clinical Decision Making (PT Evaluation Complexity): moderate complexity  Overall Complexity (PT Evaluation Complexity): moderate complexity     PT Charges       Row Name 05/26/24 1622             Time Calculation    Start Time 1434  -DM      PT Received On 05/26/24  -DM      PT Goal Re-Cert Due Date 06/05/24  -DM         Timed Charges    41252 - PT Therapeutic Exercise Minutes 24  -DM      98023 - PT Therapeutic Activity Minutes 20  -DM         Untimed Charges    PT Eval/Re-eval Minutes 60  -DM         Total Minutes    Timed  Charges Total Minutes 44  -DM      Untimed Charges Total Minutes 60  -DM       Total Minutes 104  -DM                User Key  (r) = Recorded By, (t) = Taken By, (c) = Cosigned By      Initials Name Provider Type    Michelle Jc, PT Physical Therapist                  Therapy Charges for Today       Code Description Service Date Service Provider Modifiers Qty    14184726633  PT THER PROC EA 15 MIN 5/26/2024 Michelle Claros, PT GP 2    [id number] HC PT THERAPEUTIC ACT EA 15 MIN 5/26/2024 Michelle Claros, PT GP 1    [id number]  PT EVAL MOD COMPLEXITY 4 5/26/2024 Michelle Claros, PT GP 1            PT G-Codes  Outcome Measure Options: AM-PAC 6 Clicks Basic Mobility (PT)  AM-PAC 6 Clicks Score (PT): 19  AM-PAC 6 Clicks Score (OT): 21  Modified Catarina Scale: 2 - Slight disability.  Unable to carry out all previous activities but able to look after own affairs without assistance.  PT Discharge Summary  Anticipated Discharge Disposition (PT): home with assist, home with outpatient therapy services    Michelle Claros, PT  5/26/2024

## 2024-05-26 NOTE — OUTREACH NOTE
Medical Week 2 Survey      Flowsheet Row Responses   [facility] patient discharged from? [facility]   Does the patient have one of the following disease processes/diagnoses(primary or secondary)? Other   Week 2 attempt successful? No   Unsuccessful attempts Attempt 1   Revoke Readmitted            DARVIN HECTOR - Registered Nurse

## 2024-05-26 NOTE — CONSULTS
HEMATOLOGY/ONCOLOGY INPATIENT CONSULTATION      REFERRING PHYSICIAN: Rosalind Campa MD    PRIMARY CARE PROVIDER: Hayley Castellanos APRN    REASON FOR CONSULTATION: Lung cancer status post recent immunotherapy with pyelonephritis, C. 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  He had residual disease involving the right lower lobe with 60% residual viable tumor and positive treatment effect.  Margins were negative.  Lymph nodes negative.  He initiated immunotherapy maintenance in February 2024.  He received his most recent dose Opdivo 4/4/2024.  He had a PET/CT in early April showing very low level activity in the paratracheal and supraclavicular lymph node that is very close to background mediastinal/hepatic uptake. Compared to his prior malignancy, the degree of FDG uptake was markedly lower and serial observation planned.      He was then admitted from 4/12/2024 through 4/22/2024 with severe sepsis which was attributed to bilateral pyelonephritis in the context of abnormal CT scan findings.  His cultures were negative.  However clinically he improved on antibiotics and he received a 10-day course of cefepime.He was re-admitted from 4/24/2024 through 4/29/2024 with recurrent symptoms which again resolved with cefepime.  He was discharged on oral Levaquin.  He had normalization of his white blood cell count and improvement in his kidney function with IV antibiotics.  Unfortunately he represented with sepsis on 5/5/2024-5/13/24 and was discharged on a prolonged course of cefepime. He was then admitted with c diff colitis and continued ARACELI. Imaging that admission showed  "stable post surgical change in the lung as well as a 3 cm subcarinal LN and diffuse colitis.     He is now readmitted with recurrent hypotension and acute on chronic kidney injury.  The patient's significant other reports he was taking midodrine at home but was not drinking well.  He has not noted any recurrent diarrhea.  No abdominal pain or fever.    Last admission he underwent ACTH stimulation test which was normal.  Renal and aldosterone are pending.      Allergies   Allergen Reactions    Cymbalta [Duloxetine Hcl] GI Intolerance    Gabapentin Mental Status Change     Pt states that this medication \"makes him feel foolish in his head\".     Remeron [Mirtazapine] Other (See Comments)     Excess sedation    Toradol [Ketorolac Tromethamine] GI Intolerance     Projectile vomiting     Latex Other (See Comments)     Latex allergy     Tape Rash       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    Coronary artery disease 2019    Diabetes mellitus Borderline    Emphysema/COPD     Erectile disorder     GERD (gastroesophageal reflux disease)     History of chemotherapy     Hyperlipidemia     Hypertension 2019    Lung nodule     Mumps     Mumps     Pruritus     after bath    Slow to wake up after anesthesia     Stage 5 chronic kidney disease not on chronic dialysis 5/14/2024    Wears dentures     upper only    Wears hearing aid in both ears     usually only wears right         Current Facility-Administered Medications:     acetaminophen (TYLENOL) tablet 650 mg, 650 mg, Oral, Q4H PRN **OR** acetaminophen (TYLENOL) 160 MG/5ML oral solution 650 mg, 650 mg, Oral, Q4H PRN **OR** acetaminophen (TYLENOL) suppository 650 mg, 650 mg, Rectal, Q4H PRN, Bryson, Marisabel, APRN    albuterol sulfate HFA (PROVENTIL HFA;VENTOLIN HFA;PROAIR HFA) inhaler 2 puff, 2 puff, Inhalation, Q4H PRN, Bryson, Marisabel, APRN    " budesonide-formoterol (SYMBICORT) 160-4.5 MCG/ACT inhaler 2 puff, 2 puff, Inhalation, BID - RT, 2 puff at 05/26/24 0743 **AND** tiotropium (SPIRIVA RESPIMAT) 2.5 mcg/act aerosol solution inhaler, 2 puff, Inhalation, Daily - RT, Bryson, Marisabel, APRN, 2 puff at 05/26/24 0743    ferrous sulfate tablet 325 mg, 325 mg, Oral, Daily With Breakfast, Bryson, Marisabel, APRN, 325 mg at 05/26/24 0834    fludrocortisone tablet 50 mcg, 50 mcg, Oral, Daily, [place]oacano, Kurt Martinez MD, 50 mcg at 05/26/24 1513    megestrol (MEGACE) tablet 20 mg, 20 mg, Oral, Daily, Bryson, Marisabel, APRN, 20 mg at 05/26/24 0834    midodrine (PROAMATINE) tablet 10 mg, 10 mg, Oral, TID PRN, Bryson, Marisabel, APRN, 10 mg at 05/26/24 1145    nitroglycerin (NITROSTAT) SL tablet 0.4 mg, 0.4 mg, Sublingual, Q5 Min PRN, Bryson, Marisabel, APRN    pravastatin (PRAVACHOL) tablet 80 mg, 80 mg, Oral, Nightly, Bryson, Marisabel, APRN, 80 mg at 05/25/24 2039    sodium bicarbonate tablet 1,300 mg, 1,300 mg, Oral, TID, Bryson, Marisabel, APRN, 1,300 mg at 05/26/24 1603    sodium chloride 0.9 % infusion, 75 mL/hr, Intravenous, Continuous, Rosalind Campa MD, Last Rate: 75 mL/hr at 05/26/24 1604, 75 mL/hr at 05/26/24 1604    vancomycin (VANCOCIN) capsule 125 mg, 125 mg, Oral, Q6H, Bryson, Marisabel, APRN, 125 mg at 05/26/24 1145    Past Surgical History:   Procedure Laterality Date    BONE BIOPSY      broken bone surgery in his face    BRONCHOSCOPY THORACOTOMY Right 01/09/2024    Procedure: THORACOTOMY FOR LOWER LOBECTOMY AND MEDISTINAL LYMPH NODE DISSECTION RIGHT;  Surgeon: Joey Patel MD;  Location: [facility] OR;  Service: Cardiothoracic;  Laterality: Right;    BRONCHOSCOPY WITH ION ROBOTIC ASSIST N/A 09/15/2023    Procedure: BRONCHOSCOPY NAVIGATION WITH ENDOBRONCHIAL ULTRASOUND AND ION ROBOT;  Surgeon: Octaviano Sampson MD;  Location: [facility] ENDOSCOPY;  Service: Robotics - Pulmonary;  Laterality: N/A;  ion #6 - 0032  - 0015  Cath guide 0061    EBUS balloon  removed and intact    CARDIAC ELECTROPHYSIOLOGY PROCEDURE N/A 08/17/2021    Procedure: Pacemaker DC new;  Surgeon: Kayy Box MD;  Location: [facility] INVASIVE LOCATION;  Service: Cardiology;  Laterality: N/A;    FACIAL FRACTURE SURGERY      LYMPH NODE BIOPSY  2023    PACEMAKER IMPLANTATION         Social History     Socioeconomic History    Marital status: Single    Number of children: 3   Tobacco Use    Smoking status: Every Day     Current packs/day: 0.50     Average packs/day: 0.5 packs/day for 56.4 years (28.7 ttl pk-yrs)     Types: Cigarettes     Start date: 1/1/1968    Smokeless tobacco: Never    Tobacco comments:     Still smoke   Vaping Use    Vaping status: Never Used   Substance and Sexual Activity    Alcohol use: Never    Drug use: Never    Sexual activity: Yes     Partners: Female     Birth control/protection: None       Family History   Problem Relation Age of Onset    Aneurysm Mother         brain    Dementia Father     Leukemia Sister     Heart disease Paternal Grandmother     Hypertension Paternal Grandfather     Cancer Sister        Oncology/Hematology History   Malignant neoplasm of lower lobe of right lung   10/4/2023 Initial Diagnosis    Malignant neoplasm of lower lobe of right lung     10/4/2023 Cancer Staged    Staging form: Lung, AJCC 8th Edition  - Clinical: Stage IIIA (cT2b, cN2, cM0) - Signed by Neetu Ashley MD on 10/4/2023     10/23/2023 - 12/5/2023 Chemotherapy    OP LUNG  Nivolumab 360mg /  PACLitaxel / CARBOplatin AUC=5      10/23/2023 -  Chemotherapy    OP CENTRAL VENOUS ACCESS DEVICE Access, Care, and Maintenance (CVAD)     2/6/2024 - 2/27/2024 Chemotherapy    OP LUNG Atezolizumab     4/4/2024 Biopsy    OP LUNG Nivolumab 480 mg  Plan Provider: Neetu Ashley MD  Treatment goal: Curative  Line of treatment: [No plan line of treatment]           REVIEW OF SYSTEMS:  A 14 point review of systems was performed and is negative except as noted below.    Review of  Systems - Oncology      Objective     Vitals:    05/26/24 1400 05/26/24 1500 05/26/24 1544 05/26/24 1600   BP:   175/85 144/77   BP Location:   Right arm Right arm   Patient Position:   Lying Sitting   Pulse: 70 71 69 70   Resp:   17 18   Temp:   98.2 °F (36.8 °C)    TempSrc:   Oral    SpO2: 98% 97% 99% 98%   Weight:       Height:                       Temp:  [97.8 °F (36.6 °C)-98.5 °F (36.9 °C)] 98.2 °F (36.8 °C)     Performance Status: 1    Physical Exam    General: well appearing male in no acute distress  HEENT: sclerae anicteric, oropharynx clear  Lymphatics: no cervical, supraclavicular, or axillary adenopathy  Cardiovascular: regular rate and rhythm, no murmurs  Lungs: clear to auscultation bilaterally  Abdomen: soft, nontender, nondistended.  No palpable organomegaly  Extremities: no lower extremity edema  Skin: no rashes, lesions, bruising, or petechiae      LABS:    Lab Results   Component Value Date    HGB 8.4 (L) 05/26/2024    HCT 25.5 (L) 05/26/2024    MCV 90.1 05/26/2024     05/26/2024    WBC 23.95 (H) 05/26/2024    NEUTROABS 20.34 (H) 05/26/2024    LYMPHSABS 1.80 05/26/2024    MONOSABS 1.35 (H) 05/26/2024    EOSABS 0.02 05/26/2024    BASOSABS 0.06 05/26/2024     Lab Results   Component Value Date    GLUCOSE 95 05/26/2024    BUN 40 (H) 05/26/2024    CREATININE 5.94 (H) 05/26/2024     (L) 05/26/2024    K 4.1 05/26/2024     05/26/2024    CO2 21.0 (L) 05/26/2024    CALCIUM 8.0 (L) 05/26/2024    PROTEINTOT 6.3 05/25/2024    ALBUMIN 2.4 (L) 05/26/2024    BILITOT 0.3 05/25/2024    ALKPHOS 108 05/25/2024    AST 25 05/25/2024    ALT 22 05/25/2024         IMAGING    CT Abdomen Pelvis Without Contrast    Result Date: 5/25/2024  CT ABDOMEN PELVIS WO CONTRAST Date of Exam: 5/25/2024 3:09 PM EDT Indication: recent c diff, n/v. elevated wbc. Comparison: 5/19/2024 Technique: Axial CT images were obtained of the abdomen and pelvis without the administration of contrast. Reconstructed coronal and  sagittal images were also obtained. Automated exposure control and iterative construction methods were used. Findings: Limited views of the lung bases demonstrates mild groundglass opacity in the left lung base as well as residual right base effusion. The liver is unremarkable. The gallbladder is normal. The pancreas is normal. Spleen is normal. Bilateral adrenal glands are normal. Bilateral kidneys are normal. The bladder is unremarkable. Sigmoid colon and rectum are unremarkable. The descending colon is unremarkable. There is wall thickening and inflammatory changes surrounding the a sending colon. The small bowel is unremarkable. No evidence of abscess is identified. The appendix is normal. The small bowel is unremarkable. The stomach is unremarkable. Prominent vascular calcifications. No adenopathy is identified. There is stranding within the pararenal fat as well as in the mesentery and along the paracolic gutters. This may be secondary to anasarca. No aggressive appearing lytic or sclerotic bone lesions.     Impression: 1. Persistent right effusion. 2. Increased inflammatory changes involving the predominantly ascending colon. Findings are most consistent with colitis. 2. Increased edema within the fat likely reflects anasarca. Electronically Signed: Micha Millan MD  5/25/2024 6:27 PM EDT  Workstation ID: [id number]    CT Chest Without Contrast Diagnostic    Result Date: 5/19/2024  CT CHEST WO CONTRAST DIAGNOSTIC, CT ABDOMEN PELVIS WO CONTRAST Date of Exam: 5/19/2024 11:23 AM EDT Indication: soa. lung ca. being tx for sepsis.. Comparison: 5/6/2024 Technique: Axial CT images were obtained of the chest, abdomen, and pelvis without contrast administration.  Reconstructed coronal and sagittal images were also obtained. Automated exposure control and iterative construction methods were used. Findings: Stable biapical scarring more pronounced on the right again noted. Subpleural blebs. Right-sided volume loss related  to postsurgical changes. Soft tissue along the right suture line has decreased compared with the examination performed on 4/24/2024 but is stable compared with 5/6/2024 and decreased compared with 2/21/2024 possibly postsurgical changes. Residual underlying tumor not excluded. This measures 4.3 cm x 1.7 cm. Small right pleural effusion. Calcified granulomata. No pneumothorax. Calcified mediastinal and hilar lymph nodes consistent with prior granulomatous disease. 3.1 cm x 1.8 cm subcarinal lymph node. Severe atheromatous disease of the coronary vessels. Liver:No masses. No intrahepatic biliary ductal dilatation. Spleen:No masses. No perisplenic hematoma. Pancreas:No pancreatic masses. No evidence of pancreatitis. Gallbladder and common bile duct:No evidence of cholelithiasis. No evidence of cholecystitis. Adrenal glands:No adrenal masses Kidneys and ureters:No kidney stones. No renal masses.No calculi present within the ureters. Normal caliber ureters. Urinary bladder:No urinary bladder wall thickening. No bladder masses. Small bowel:Normal caliber small bowel. Large bowel: Diffuse large bowel wall thickening with surrounding infiltration of the fat consistent with colitis. Appendix: Normal GENITOURINARY: Normal prostate Ascites or pneumoperitoneum:None. Adenopathy: No adenopathy within the pelvis. Osseous structures: Degenerative changes of the hips and lumbar spine. No lytic or blastic disease is definitively identified. Other findings: None     Colitis. No abscess formation or perforation. Small right pleural effusion. Stable appearance of the right lower lobe density. Electronically Signed: Kam Foy MD  5/19/2024 11:43 AM EDT  Workstation ID: [id number]    CT Abdomen Pelvis Without Contrast    Result Date: 5/19/2024  CT CHEST WO CONTRAST DIAGNOSTIC, CT ABDOMEN PELVIS WO CONTRAST Date of Exam: 5/19/2024 11:23 AM EDT Indication: soa. lung ca. being tx for sepsis.. Comparison: 5/6/2024 Technique: Axial CT images  were obtained of the chest, abdomen, and pelvis without contrast administration.  Reconstructed coronal and sagittal images were also obtained. Automated exposure control and iterative construction methods were used. Findings: Stable biapical scarring more pronounced on the right again noted. Subpleural blebs. Right-sided volume loss related to postsurgical changes. Soft tissue along the right suture line has decreased compared with the examination performed on 4/24/2024 but is stable compared with 5/6/2024 and decreased compared with 2/21/2024 possibly postsurgical changes. Residual underlying tumor not excluded. This measures 4.3 cm x 1.7 cm. Small right pleural effusion. Calcified granulomata. No pneumothorax. Calcified mediastinal and hilar lymph nodes consistent with prior granulomatous disease. 3.1 cm x 1.8 cm subcarinal lymph node. Severe atheromatous disease of the coronary vessels. Liver:No masses. No intrahepatic biliary ductal dilatation. Spleen:No masses. No perisplenic hematoma. Pancreas:No pancreatic masses. No evidence of pancreatitis. Gallbladder and common bile duct:No evidence of cholelithiasis. No evidence of cholecystitis. Adrenal glands:No adrenal masses Kidneys and ureters:No kidney stones. No renal masses.No calculi present within the ureters. Normal caliber ureters. Urinary bladder:No urinary bladder wall thickening. No bladder masses. Small bowel:Normal caliber small bowel. Large bowel: Diffuse large bowel wall thickening with surrounding infiltration of the fat consistent with colitis. Appendix: Normal GENITOURINARY: Normal prostate Ascites or pneumoperitoneum:None. Adenopathy: No adenopathy within the pelvis. Osseous structures: Degenerative changes of the hips and lumbar spine. No lytic or blastic disease is definitively identified. Other findings: None     Colitis. No abscess formation or perforation. Small right pleural effusion. Stable appearance of the right lower lobe density.  Electronically Signed: Kam Foy MD  5/19/2024 11:43 AM EDT  Workstation ID: [id number]    XR Chest 1 View    Result Date: 5/19/2024  XR CHEST 1 VW Date of Exam: 5/19/2024 9:37 AM EDT Indication: Weak/Dizzy/AMS triage protocol Comparison: Chest radiograph 5/14/2024. Findings: Unchanged appearance of the right chest pacemaker and left chest port. Cardiomediastinal silhouette is unchanged. Pulmonary vascular congestion with mild diffuse interstitial thickening. Background of chronic/emphysematous changes. No dominant, focal consolidation. Small/moderate right pleural effusion. No sizable left pleural effusion. No pneumothorax. Osseous structures are unchanged.     Impression: Findings suggestive of mild pulmonary edema with small/moderate right pleural effusion. No dominant, focal consolidation. Finding superimposed upon a background of chronic/emphysematous changes. Electronically Signed: Marco Leahy MD  5/19/2024 9:58 AM EDT  Workstation ID: [id number]    XR Chest 1 View    Result Date: 5/14/2024  Examination: XR CHEST 1 VW-  Date of Exam: 5/14/2024 3:48 AM  Indication: cough.  Comparison: 5/11/2024.  Technique: 1 view of the chest  Findings: No significant consolidation. Small right-sided pleural effusion present, stable to improved as compared to the previous study. Stable right subclavian AICD/pacemaker device. Stable left-sided Mediport. No pneumothorax. The heart size is normal. The pulmonary vasculature appears within normal limits. No acute osseous abnormality identified.      Small right-sided pleural effusion, stable to improved as compared to the previous study. No significant airspace disease.  This report was finalized on 5/14/2024 4:10 AM by Desiree Matthews MD.      XR Chest 1 View    Result Date: 5/11/2024  XR CHEST 1 VW Date of Exam: 5/11/2024 12:11 AM EDT Indication: dyspnea Comparison: 5/6/2024. Findings: There are no airspace consolidations. Stable scarring present within the right lung base with  stable small right-sided pleural effusion. Stable right subclavian AICD/pacemaker device. Mild linear atelectatic changes are present within the left lung base which appear new.. No pneumothorax. The pulmonary vasculature appears within normal limits. The cardiac and mediastinal silhouette appear unremarkable. No acute osseous abnormality identified.     Impression: New mild linear left basilar atelectasis. Otherwise, stable with redemonstration of small right-sided pleural effusion.. Electronically Signed: Desiree Matthews MD  5/11/2024 12:25 AM EDT  Workstation ID: [id number]    Adult Transesophageal Echo 3D (DAMIÁN) W/ Cont If Necessary Per Protocol    Result Date: 5/9/2024    Left ventricular systolic function is normal. Left ventricular ejection fraction appears to be 61 - 65%. Normal left ventricular cavity size noted. Left ventricular wall thickness is consistent with mild concentric hypertrophy. All left ventricular wall segments contract normally.   There is mild calcification of the aortic valve. The aortic valve appears trileaflet. Mild aortic valve regurgitation is present. No aortic valve stenosis is present. There is no evidence of an aortic valve mass is present.   There is mild calcification of the mitral valve. There is mild, bileaflet mitral valve thickening present. No evidence of a mitral valve mass is present. Mild to moderate mitral valve regurgitation is present. No significant mitral valve stenosis is present.   The tricuspid valve is structurally normal with no significant stenosis present. There is no evidence of a mass on the tricuspid valve. Trace tricuspid valve regurgitation is present.   The pulmonic valve is grossly normal in structure. There is trace pulmonic valve regurgitation present.   No vegetation seen on atrial aspect of pacemaker leads.  The most distal aspects of the RV lead were not completely visualized however there was no apparent vegetation in the more proximal segment, adjacent  to the tricuspid valve which appears to be functioning normally.     CT Abdomen Pelvis Without Contrast    Result Date: 5/6/2024  CT ABDOMEN PELVIS WO CONTRAST Date of Exam: 5/6/2024 12:29 AM EDT Indication: diarrhea, vomiting, hypotension. Comparison: None available. Technique: Axial CT images were obtained of the abdomen and pelvis without the administration of contrast. Reconstructed coronal and sagittal images were also obtained. Automated exposure control and iterative construction methods were used. Findings: Findings in the chest discussed in a separate report. No acute findings in the superficial soft tissues. No acute osseous abnormalities or destructive bone lesions. There is mild thoracolumbar spondylosis. There are minor osteophytes of both hips. The liver, gallbladder, bile ducts, pancreas, mid and distal stomach, duodenum and spleen appear unremarkable. Adrenal glands appear normal. There is marked bilateral perinephric stranding and fluid without organization. This appears confined to Gerota's  fascia. Kidney parenchyma appears homogeneous. No hydronephrosis. No urolithiasis. Mild urinary bladder wall thickening appears unchanged. No pericystic inflammation. There is mild colonic diverticulosis and mild colonic fecal retention. No small bowel distention. There is moderate aortoiliac atherosclerosis without evidence of aneurysm. No ascites, pneumoperitoneum or lymphadenopathy.     Impression: Marked bilateral perinephric stranding and fluid without hydronephrosis. Appearance is nonspecific, but could be related to pyelonephritis. Correlate with urinalysis. Electronically Signed: Raulito Crenshaw MD  5/6/2024 12:54 AM EDT  Workstation ID: [id number]    CT Chest Without Contrast Diagnostic    Result Date: 5/6/2024  CT CHEST WO CONTRAST DIAGNOSTIC Date of Exam: 5/6/2024 12:29 AM EDT Indication: hypotension, chills, vomiting. Comparison: Chest radiograph 5/5/2024 and chest CT 4/24/2024. Technique: Axial CT  images were obtained of the chest without contrast administration.  Reconstructed coronal and sagittal images were also obtained. Automated exposure control and iterative construction methods were used. Findings: There is a stable small loculated right-sided pleural effusion. There is stable scarring in the lung apices along with paraseptal emphysema. Stable subpleural interstitial disease in both lungs, right greater than left favored to be chronic. There is a small calcified cluster of granulomas in the left upper lobe. There appear to be changes of right lower lobectomy. There is no pneumothorax. No left-sided pleural effusion. No new or enlarging lung nodules. The thyroid, trachea and esophagus appear within normal limits. There is a small hiatal hernia. Heart size is normal. There are severe coronary artery calcifications. ICD leads noted in the right heart. Mild aortic atherosclerosis. No aneurysm. No mediastinal lymphadenopathy or pericardial effusion. There is a stable left-sided chest port. Stable right-sided ICD. No acute findings in the superficial soft tissues. There is new marked bilateral perinephric stranding and trace unorganized fluid. The kidneys are only partially included on this study. No  additional findings in the limited images of the upper abdomen. No acute osseous abnormality or destructive bone lesion. There are moderate lower cervical and mild diffuse thoracic degenerative changes. There are healing minimally displaced fractures of  the right lateral fifth and sixth ribs, which appears not significantly changed from the prior study. No new rib fractures.     Impression: 1. Stable small loculated right-sided pleural effusion with stable postoperative and chronic changes in both lungs. 2. Severe coronary artery disease. 3. Grossly abnormal appearance of partially visualized kidneys in the upper retroperitoneum. Appearance could be due to infection, inflammation or obstruction. Recommend  dedicated imaging of the abdomen and pelvis ideally with IV contrast. 4. Small hiatal hernia. 5. Stable appearance of healing right lateral fifth and sixth rib fractures. Electronically Signed: Raulito Crenshaw MD  5/6/2024 12:49 AM EDT  Workstation ID: [id number]    XR Chest 1 View    Result Date: 5/6/2024  XR CHEST 1 VW Date of Exam: 5/5/2024 11:47 PM EDT Indication: hypotension Comparison: 4/24/2024 and chest CT same date. Findings: Stable volume loss in the right lung. Stable small right-sided pleural effusion and right basilar scarring. Left lung remains clear. Stable diffuse interstitial prominence in the lungs. No pneumothorax or new lung opacity. Stable right-sided multi lead ICD. Heart size is unchanged. Pulmonary vasculature is within normal limits. No acute osseous abnormalities. Stable left-sided chest port.     Impression: Stable chronic and postoperative changes and small right-sided pleural effusion. Electronically Signed: Raulito Crenshaw MD  5/6/2024 12:21 AM EDT  Workstation ID: [id number]    Adult Transthoracic Echo Complete W/ Cont if Necessary Per Protocol    Result Date: 4/27/2024    Left ventricular ejection fraction appears to be 61 - 65%.   Left ventricular wall thickness is consistent with mild concentric hypertroph   There is calcification and thickening of the aortic valve.  No independently mobile vegetations visualized.   Mild aortic valve regurgitation is present   Mild dilation of the aortic root is present.  Measures 4.0 cm.   There are pacemaker leads noted in the right atrium/right ventricle.   Mild tricuspid valve regurgitation is present. Estimated right ventricular systolic pressure from tricuspid regurgitation is normal (<35 mmHg).   Mild mitral valve regurgitation is present       ASSESSMENT/PLAN:  Non-small cell lung cancer  History of immunotherapy  ARACELI  Recurrent hypotension  Recurrent leukocytosis  C diff colitis  -Last Opdivo 4/4/2024  -Holding further therapy  -Hospitalist,  nephrology notes and interim labs reviewed.  -Panhypopituitarism workup from  April 26 was negative Ino stim test from last admission not consistent with adrenal insufficiency. Renin/viola are pending and trial of fludrocortisone planned per nephrology.   -Immunotherapy induced nephritis and potential renal biopsy has been considered/discussed on prior admissions but deferred as his renal function appears to recover once hypotension treated.  Treatment for immunotherapy related adverse events (other than endocrinopathies)  is generally high dose steroids and if there is clinical concern for immunotherapy induced nephritis would recommend prednisone 1mg/kg equivalent with slow taper.   -Pt notes he is not drinking well at home. Discussed potential for scheduled IVF on discharge in the infusion center. .     7. Indeterminate subcarinal LN noted.   -I would recommend we continue to follow his lung cancer with serial outpt PET after he recovers from this acute issues and this is planned 6/20    Case discussed with attending hospitalist  Neetu Ashley MD    5/26/2024

## 2024-05-26 NOTE — PLAN OF CARE
Goal Outcome Evaluation:              Outcome Evaluation: Patient A&O x4 able to voice wants and needs to staff has denied pain today. Was given one dose of Midodrine today related to blood pressure less than 100 which was effective. He was started on Fludrocortisone today his blood pressure has improved with this up to 144/77. He has worked with PT/OT without any problems noted. He did has a bowel movement today that appeared to be mucus. Has alarms on and in place.

## 2024-05-26 NOTE — PLAN OF CARE
Goal Outcome Evaluation:  Plan of Care Reviewed With: patient, significant other        Progress: no change  Outcome Evaluation: Baseline ADL completion and related mobillity limited by decreased activity tolerance and generalized weakness, warrants skilled IP OT services to promote return to PLOF. Dizziness and 42pt drop systolic BP upon standing. Rec d/c to home with assist and OP OT/PT for generalized strenghening/balance.      Anticipated Discharge Disposition (OT): home with assist, home with outpatient therapy services

## 2024-05-26 NOTE — PROGRESS NOTES
[facility] Medicine Services  PROGRESS NOTE    Patient Name: David Barfield  : 1950  MRN: [id number]    Date of Admission: 2024  Primary Care Physician: Hayley Castellanos APRN    Subjective   Subjective     CC:  Low BP    HPI:  Met with patient and significant other at the bedside.  I have discussed case with Dr. Mistry as well as Dr. Ríos.  At this time patient does not have any complaints or concerns.      Objective   Objective     Vital Signs:   Temp:  [97.9 °F (36.6 °C)-98.2 °F (36.8 °C)] 98.2 °F (36.8 °C)  Heart Rate:  [70-99] 71  Resp:  [16-19] 16  BP: ()/(55-75) 100/65     Physical Exam:  Constitutional:  male no acute distress, awake, alert  HENT: NCAT, mucous membranes moist  Respiratory: Clear to auscultation bilaterally, respiratory effort normal   Cardiovascular: RRR, no murmurs, rubs, or gallops  Gastrointestinal: Positive bowel sounds, soft, nontender, nondistended  Musculoskeletal: No bilateral ankle edema  Psychiatric: Appropriate affect, cooperative  Neurologic: Oriented x 3, delayed thought, speech clear  Skin: No rashes    Results Reviewed:  LAB RESULTS:      Lab 24  0509 24  0718 24  0343 24  0715 24  0310 24  0448 24  1820 24  0940   WBC 23.95*  --  30.99* 15.69* 15.36* 17.18* 22.57* 21.85*   HEMOGLOBIN 8.4*  --  10.3* 9.0* 9.2* 8.9* 9.5* 10.0*   HEMATOCRIT 25.5*  --  31.6* 27.6* 28.1* 28.0* 29.8* 32.3*   PLATELETS 227  --  294 269 290 288 324 326   NEUTROS ABS 20.34*  --  28.51* 12.05* 11.94*  --  19.28* 19.05*   IMMATURE GRANS (ABS) 0.38*  --   --  0.31* 0.33*  --  0.31* 0.19*   LYMPHS ABS 1.80  --   --  1.35 1.20  --  0.94 1.02   MONOS ABS 1.35*  --   --  1.89* 1.81*  --  1.82* 1.37*   EOS ABS 0.02  --  0.00 0.05 0.05  --  0.16 0.16   MCV 90.1  --  88.5 89.6 93.4 92.7 91.7 94.7   PROCALCITONIN  --   --   --   --   --   --   --  1.03*   LACTATE  --  2.1* 2.3*  --   --   --   --  1.3          Lab 05/26/24  0509 05/25/24  0343 05/22/24 0310 05/21/24 0448 05/20/24 1820 05/19/24  0940   SODIUM 133* 135* 133* 134* 133* 136   POTASSIUM 4.1 3.9 4.2 4.3 4.4 4.2   CHLORIDE 100 105 102 103 101 104   CO2 21.0* 13.0* 16.0* 17.0* 17.0* 19.0*   ANION GAP 12.0 17.0* 15.0 14.0 15.0 13.0   BUN 40* 28* 33* 37* 36* 34*   CREATININE 5.94* 3.27* 3.86* 4.49* 4.57* 4.73*   EGFR 9.3* 18.9* 15.5* 12.9* 12.7* 12.2*   GLUCOSE 95 102* 85 91 108* 118*   CALCIUM 8.0* 8.4* 8.3* 8.4* 8.9 9.2   MAGNESIUM  --   --   --   --   --  1.5*   PHOSPHORUS 3.4  --  3.7  --   --   --          Lab 05/26/24  0509 05/25/24 0343 05/22/24 0310 05/20/24 1820 05/19/24  0940   TOTAL PROTEIN  --  6.3  --  7.6 7.3   ALBUMIN 2.4* 2.7* 3.0* 3.4* 3.6   GLOBULIN  --  3.6  --  4.2 3.7   ALT (SGPT)  --  22  --  9 9   AST (SGOT)  --  25  --  13 11   BILIRUBIN  --  0.3  --  0.2 0.2   ALK PHOS  --  108  --  101 98         Lab 05/25/24  0343 05/19/24  0940   HSTROP T 31* 33*                 Brief Urine Lab Results  (Last result in the past 365 days)        Color   Clarity   Blood   Leuk Est   Nitrite   Protein   CREAT   Urine HCG        05/25/24 0354 Yellow   Clear   Trace   Moderate (2+)   Negative   100 mg/dL (2+)                   Cultures:  Blood Culture   Date Value Ref Range Status   05/25/2024 No growth at 24 hours  Preliminary   05/25/2024 No growth at 24 hours  Preliminary   05/20/2024 No growth at 5 days  Final   05/20/2024 No growth at 5 days  Final   05/20/2024 No growth at 5 days  Final   05/19/2024 No growth at 5 days  Final   05/19/2024 No growth at 5 days  Final     Urine Culture   Date Value Ref Range Status   05/19/2024 No growth  Final       Microbiology Results Abnormal       Procedure Component Value - Date/Time    Blood Culture - Blood, Hand, Left [[id number]]  (Normal) Collected: 05/25/24 0506    Lab Status: Preliminary result Specimen: Blood from Hand, Left Updated: 05/26/24 0731     Blood Culture No growth at 24 hours    Narrative:       Less than seven (7) mL's of blood was collected.  Insufficient quantity may yield false negative results.    Blood Culture - Blood, Hand, Right [[id number]]  (Normal) Collected: 05/25/24 0506    Lab Status: Preliminary result Specimen: Blood from Hand, Right Updated: 05/26/24 0731     Blood Culture No growth at 24 hours    Narrative:      Less than seven (7) mL's of blood was collected.  Insufficient quantity may yield false negative results.            CT Abdomen Pelvis Without Contrast    Result Date: 5/25/2024  CT ABDOMEN PELVIS WO CONTRAST Date of Exam: 5/25/2024 3:09 PM EDT Indication: recent c diff, n/v. elevated wbc. Comparison: 5/19/2024 Technique: Axial CT images were obtained of the abdomen and pelvis without the administration of contrast. Reconstructed coronal and sagittal images were also obtained. Automated exposure control and iterative construction methods were used. Findings: Limited views of the lung bases demonstrates mild groundglass opacity in the left lung base as well as residual right base effusion. The liver is unremarkable. The gallbladder is normal. The pancreas is normal. Spleen is normal. Bilateral adrenal glands are normal. Bilateral kidneys are normal. The bladder is unremarkable. Sigmoid colon and rectum are unremarkable. The descending colon is unremarkable. There is wall thickening and inflammatory changes surrounding the a sending colon. The small bowel is unremarkable. No evidence of abscess is identified. The appendix is normal. The small bowel is unremarkable. The stomach is unremarkable. Prominent vascular calcifications. No adenopathy is identified. There is stranding within the pararenal fat as well as in the mesentery and along the paracolic gutters. This may be secondary to anasarca. No aggressive appearing lytic or sclerotic bone lesions.     Impression: Impression: 1. Persistent right effusion. 2. Increased inflammatory changes involving the predominantly ascending  colon. Findings are most consistent with colitis. 2. Increased edema within the fat likely reflects anasarca. Electronically Signed: Micha Millan MD  5/25/2024 6:27 PM EDT  Workstation ID: [id number]     Results for orders placed during the hospital encounter of 05/05/24    Adult Transesophageal Echo 3D (D[facility]N) W/ Cont If Necessary Per Protocol    Interpretation Summary    Left ventricular systolic function is normal. Left ventricular ejection fraction appears to be 61 - 65%. Normal left ventricular cavity size noted. Left ventricular wall thickness is consistent with mild concentric hypertrophy. All left ventricular wall segments contract normally.    There is mild calcification of the aortic valve. The aortic valve appears trileaflet. Mild aortic valve regurgitation is present. No aortic valve stenosis is present. There is no evidence of an aortic valve mass is present.    There is mild calcification of the mitral valve. There is mild, bileaflet mitral valve thickening present. No evidence of a mitral valve mass is present. Mild to moderate mitral valve regurgitation is present. No significant mitral valve stenosis is present.    The tricuspid valve is structurally normal with no significant stenosis present. There is no evidence of a mass on the tricuspid valve. Trace tricuspid valve regurgitation is present.    The pulmonic valve is grossly normal in structure. There is trace pulmonic valve regurgitation present.    No vegetation seen on atrial aspect of pacemaker leads.  The most distal aspects of the RV lead were not completely visualized however there was no apparent vegetation in the more proximal segment, adjacent to the tricuspid valve which appears to be functioning normally.      Current medications:  Scheduled Meds:budesonide-formoterol, 2 puff, Inhalation, BID - RT   And  tiotropium bromide monohydrate, 2 puff, Inhalation, Daily - RT  ferrous sulfate, 325 mg, Oral, Daily With Breakfast  megestrol, 20 mg,  Oral, Daily  pravastatin, 80 mg, Oral, Nightly  sodium bicarbonate, 1,300 mg, Oral, TID  vancomycin, 125 mg, Oral, Q6H      Continuous Infusions:   PRN Meds:.  acetaminophen **OR** acetaminophen **OR** acetaminophen    albuterol sulfate HFA    midodrine    nitroglycerin    Assessment & Plan   Assessment & Plan     Active Hospital Problems    Diagnosis  POA    **Hx of hypotension [Z86.79]  Not Applicable    Hypotension [I95.9]  Yes    Dehydration [E86.0]  Yes    C. difficile colitis [A04.72]  Yes    UTI (urinary tract infection) [N39.0]  Unknown    Stage 4 chronic kidney disease [N18.4]  Yes    CAD (coronary artery disease) [I25.10]  Yes    GERD without esophagitis [K21.9]  Yes    Malignant neoplasm of lower lobe of right lung [C34.31]  Yes    Centrilobular emphysema [J43.2]  Yes    Tobacco abuse [Z72.0]  Yes    Mixed hyperlipidemia [E78.2]  Yes      Resolved Hospital Problems   No resolved problems to display.        Brief Hospital Course to date:  - detailed assessment and plan from admission reviewed  -History of non-small cell lung cancer admitted for low BP, nausea, vomiting and generalized weakness.     Hypotension, fluid responsive  - BP improved after IV fluids  - Recent workup included TTE, appropriate cortisol, normal TSH, ACTH stimulation test was negative  - Continue IV fluids  - Encourage oral intake  - Continue midodrine  --05/26: Spoke with Dr. Mistry who is cleared the patient to be started on steroids if needed.  Spoke with Dr. Ríos, plan is to start fludrocortisone along with midodrine and IV fluids     ARACELI on CKD stage IV  Metabolic acidosis  --Appreciate nephrology input.      C. difficile colitis  - Continue oral vancomycin 125 mg every 6 hours for total of 2 weeks  - ID consulted.  - Check CT abdomen/pelvis without contrast     Leukocytosis  - Trending down.  - UA noted above  - Continue oral vancomycin  - Per ID hold on further IV antibiotics at this time        NSCLC  - Status post neoadjuvant  chemo, RLL lobectomy 1/20/2024, immunotherapy (last dose of optivo 4/4/2024)  - Follows with Dr. Ashley  -Per significant other all treatment on hold currently     CAD  AV block status post PPM  Hx hypertension  - Continue statin, hold amlodipine due to hypotension     Chronic anemia  - Monitor CBC  - Continue iron supplement     Emphysema  Tobacco abuse  -Trelegy    Expected Discharge Location and Transportation: home with [facility]  Expected Discharge 05/28/2024  Expected Discharge Date: 5/28/2024; Expected Discharge Time:      DVT prophylaxis:  Mechanical DVT prophylaxis orders are present.         AM-PAC 6 Clicks Score (PT): 19 (05/25/24 0900)    CODE STATUS:   Code Status and Medical Interventions:   Ordered at: 05/25/24 0618     Level Of Support Discussed With:    Patient     Code Status (Patient has no pulse and is not breathing):    CPR (Attempt to Resuscitate)     Medical Interventions (Patient has pulse or is breathing):    Full Support       Rosalind Campa MD  05/26/24

## 2024-05-27 LAB
ANION GAP SERPL CALCULATED.3IONS-SCNC: 15 MMOL/L (ref 5–15)
BACTERIA UR QL AUTO: ABNORMAL /HPF
BILIRUB UR QL STRIP: NEGATIVE
BUN SERPL-MCNC: 48 MG/DL (ref 8–23)
BUN/CREAT SERPL: 6.9 (ref 7–25)
CALCIUM SPEC-SCNC: 7.6 MG/DL (ref 8.6–10.5)
CHLORIDE SERPL-SCNC: 99 MMOL/L (ref 98–107)
CK SERPL-CCNC: 10 U/L (ref 20–200)
CLARITY UR: CLEAR
CO2 SERPL-SCNC: 18 MMOL/L (ref 22–29)
COLOR UR: YELLOW
CREAT SERPL-MCNC: 6.94 MG/DL (ref 0.76–1.27)
CREAT UR-MCNC: 25 MG/DL
DEPRECATED RDW RBC AUTO: 57.4 FL (ref 37–54)
EGFRCR SERPLBLD CKD-EPI 2021: 7.7 ML/MIN/1.73
EOSINOPHIL SPEC QL MICRO: 0 % EOS/100 CELLS (ref 0–0)
ERYTHROCYTE [DISTWIDTH] IN BLOOD BY AUTOMATED COUNT: 17.2 % (ref 12.3–15.4)
GLUCOSE SERPL-MCNC: 101 MG/DL (ref 65–99)
GLUCOSE UR STRIP-MCNC: ABNORMAL MG/DL
HCT VFR BLD AUTO: 24.8 % (ref 37.5–51)
HGB BLD-MCNC: 8 G/DL (ref 13–17.7)
HGB UR QL STRIP.AUTO: ABNORMAL
HYALINE CASTS UR QL AUTO: ABNORMAL /LPF
KETONES UR QL STRIP: NEGATIVE
LDH SERPL-CCNC: 181 U/L (ref 135–225)
LEUKOCYTE ESTERASE UR QL STRIP.AUTO: ABNORMAL
MCH RBC QN AUTO: 29.5 PG (ref 26.6–33)
MCHC RBC AUTO-ENTMCNC: 32.3 G/DL (ref 31.5–35.7)
MCV RBC AUTO: 91.5 FL (ref 79–97)
NITRITE UR QL STRIP: NEGATIVE
PH UR STRIP.AUTO: 7.5 [PH] (ref 5–8)
PLATELET # BLD AUTO: 235 10*3/MM3 (ref 140–450)
PMV BLD AUTO: 8.2 FL (ref 6–12)
POTASSIUM SERPL-SCNC: 4.2 MMOL/L (ref 3.5–5.2)
PROT ?TM UR-MCNC: 46 MG/DL
PROT UR QL STRIP: ABNORMAL
RBC # BLD AUTO: 2.71 10*6/MM3 (ref 4.14–5.8)
RBC # UR STRIP: ABNORMAL /HPF
REF LAB TEST METHOD: ABNORMAL
SODIUM SERPL-SCNC: 132 MMOL/L (ref 136–145)
SODIUM UR-SCNC: 87 MMOL/L
SP GR UR STRIP: 1.01 (ref 1–1.03)
SQUAMOUS #/AREA URNS HPF: ABNORMAL /HPF
URATE SERPL-MCNC: 7.2 MG/DL (ref 3.4–7)
UROBILINOGEN UR QL STRIP: ABNORMAL
VANCOMYCIN TROUGH SERPL-MCNC: <4 MCG/ML (ref 5–20)
WBC # UR STRIP: ABNORMAL /HPF
WBC NRBC COR # BLD AUTO: 21.9 10*3/MM3 (ref 3.4–10.8)

## 2024-05-27 PROCEDURE — 80048 BASIC METABOLIC PNL TOTAL CA: CPT | Performed by: FAMILY MEDICINE

## 2024-05-27 PROCEDURE — 25810000003 SODIUM CHLORIDE 0.9 % SOLUTION: Performed by: INTERNAL MEDICINE

## 2024-05-27 PROCEDURE — 82550 ASSAY OF CK (CPK): CPT | Performed by: INTERNAL MEDICINE

## 2024-05-27 PROCEDURE — 94664 DEMO&/EVAL PT USE INHALER: CPT

## 2024-05-27 PROCEDURE — 94799 UNLISTED PULMONARY SVC/PX: CPT

## 2024-05-27 PROCEDURE — 83615 LACTATE (LD) (LDH) ENZYME: CPT | Performed by: INTERNAL MEDICINE

## 2024-05-27 PROCEDURE — 80202 ASSAY OF VANCOMYCIN: CPT | Performed by: INTERNAL MEDICINE

## 2024-05-27 PROCEDURE — 84300 ASSAY OF URINE SODIUM: CPT | Performed by: INTERNAL MEDICINE

## 2024-05-27 PROCEDURE — 99232 SBSQ HOSP IP/OBS MODERATE 35: CPT | Performed by: INTERNAL MEDICINE

## 2024-05-27 PROCEDURE — 85027 COMPLETE CBC AUTOMATED: CPT | Performed by: FAMILY MEDICINE

## 2024-05-27 PROCEDURE — 84550 ASSAY OF BLOOD/URIC ACID: CPT | Performed by: INTERNAL MEDICINE

## 2024-05-27 PROCEDURE — 82570 ASSAY OF URINE CREATININE: CPT | Performed by: INTERNAL MEDICINE

## 2024-05-27 PROCEDURE — 25010000002 ONDANSETRON PER 1 MG: Performed by: PHYSICIAN ASSISTANT

## 2024-05-27 PROCEDURE — 81001 URINALYSIS AUTO W/SCOPE: CPT | Performed by: INTERNAL MEDICINE

## 2024-05-27 PROCEDURE — 87205 SMEAR GRAM STAIN: CPT | Performed by: INTERNAL MEDICINE

## 2024-05-27 PROCEDURE — 84156 ASSAY OF PROTEIN URINE: CPT | Performed by: INTERNAL MEDICINE

## 2024-05-27 RX ORDER — ONDANSETRON 2 MG/ML
4 INJECTION INTRAMUSCULAR; INTRAVENOUS EVERY 6 HOURS PRN
Status: DISCONTINUED | OUTPATIENT
Start: 2024-05-27 | End: 2024-05-31 | Stop reason: HOSPADM

## 2024-05-27 RX ADMIN — SODIUM BICARBONATE 650 MG TABLET 1300 MG: at 22:11

## 2024-05-27 RX ADMIN — PRAVASTATIN SODIUM 80 MG: 40 TABLET ORAL at 22:11

## 2024-05-27 RX ADMIN — BUDESONIDE AND FORMOTEROL FUMARATE DIHYDRATE 2 PUFF: 160; 4.5 AEROSOL RESPIRATORY (INHALATION) at 19:01

## 2024-05-27 RX ADMIN — VANCOMYCIN HYDROCHLORIDE 125 MG: 125 CAPSULE ORAL at 17:16

## 2024-05-27 RX ADMIN — FERROUS SULFATE TAB 325 MG (65 MG ELEMENTAL FE) 325 MG: 325 (65 FE) TAB at 08:55

## 2024-05-27 RX ADMIN — ONDANSETRON 4 MG: 2 INJECTION INTRAMUSCULAR; INTRAVENOUS at 00:44

## 2024-05-27 RX ADMIN — VANCOMYCIN HYDROCHLORIDE 125 MG: 125 CAPSULE ORAL at 00:46

## 2024-05-27 RX ADMIN — MEGESTROL ACETATE 20 MG: 20 TABLET ORAL at 08:55

## 2024-05-27 RX ADMIN — VANCOMYCIN HYDROCHLORIDE 125 MG: 125 CAPSULE ORAL at 12:15

## 2024-05-27 RX ADMIN — TIOTROPIUM BROMIDE INHALATION SPRAY 2 PUFF: 3.12 SPRAY, METERED RESPIRATORY (INHALATION) at 09:41

## 2024-05-27 RX ADMIN — SODIUM BICARBONATE 650 MG TABLET 1300 MG: at 17:16

## 2024-05-27 RX ADMIN — SODIUM CHLORIDE 100 ML/HR: 9 INJECTION, SOLUTION INTRAVENOUS at 18:11

## 2024-05-27 RX ADMIN — SODIUM BICARBONATE 650 MG TABLET 1300 MG: at 08:55

## 2024-05-27 RX ADMIN — BUDESONIDE AND FORMOTEROL FUMARATE DIHYDRATE 2 PUFF: 160; 4.5 AEROSOL RESPIRATORY (INHALATION) at 09:41

## 2024-05-27 RX ADMIN — VANCOMYCIN HYDROCHLORIDE 125 MG: 125 CAPSULE ORAL at 05:58

## 2024-05-27 RX ADMIN — FLUDROCORTISONE ACETATE 50 MCG: 0.1 TABLET ORAL at 08:55

## 2024-05-27 NOTE — PROGRESS NOTES
[facility] Medicine Services  PROGRESS NOTE    Patient Name: David Barfield  : 1950  MRN: [id number]    Date of Admission: 2024  Primary Care Physician: Hayley Castellanos APRN    Subjective   Subjective     CC:  Low BP    HPI:  States that no diarrhea since here.  Feels ok currently.  Notes multiple admits for recurrent hypotension.        Objective   Objective     Vital Signs:   Temp:  [97.7 °F (36.5 °C)-98.9 °F (37.2 °C)] 97.9 °F (36.6 °C)  Heart Rate:  [69-76] 70  Resp:  [17-20] 18  BP: (113-175)/(72-88) 130/72     Physical Exam:  Constitutional:  male no acute distress, awake, alert  HENT: NCAT, mucous membranes moist  Respiratory: Clear to auscultation bilaterally, respiratory effort normal   Cardiovascular: RRR, no murmurs, rubs, or gallops  Gastrointestinal: Positive bowel sounds, soft, nontender, nondistended  Musculoskeletal: No bilateral ankle edema  Psychiatric: Appropriate affect, cooperative  Neurologic: Oriented x 3, delayed thought, speech clear  Skin: No rashes    Results Reviewed:  LAB RESULTS:      Lab 24  0857 24  0628 24  0509 24  0718 24  0343 24  0715 24  0310 24  0448 24  1820   WBC  --  21.90* 23.95*  --  30.99* 15.69* 15.36*   < > 22.57*   HEMOGLOBIN  --  8.0* 8.4*  --  10.3* 9.0* 9.2*   < > 9.5*   HEMATOCRIT  --  24.8* 25.5*  --  31.6* 27.6* 28.1*   < > 29.8*   PLATELETS  --  235 227  --  294 269 290   < > 324   NEUTROS ABS  --   --  20.34*  --  28.51* 12.05* 11.94*  --  19.28*   IMMATURE GRANS (ABS)  --   --  0.38*  --   --  0.31* 0.33*  --  0.31*   LYMPHS ABS  --   --  1.80  --   --  1.35 1.20  --  0.94   MONOS ABS  --   --  1.35*  --   --  1.89* 1.81*  --  1.82*   EOS ABS  --   --  0.02  --  0.00 0.05 0.05  --  0.16   MCV  --  91.5 90.1  --  88.5 89.6 93.4   < > 91.7   LACTATE  --   --   --  2.1* 2.3*  --   --   --   --      --   --   --   --   --   --   --   --     < > =  values in this interval not displayed.         Lab 05/27/24  0628 05/26/24  0509 05/25/24  0343 05/22/24  0310 05/21/24  0448   SODIUM 132* 133* 135* 133* 134*   POTASSIUM 4.2 4.1 3.9 4.2 4.3   CHLORIDE 99 100 105 102 103   CO2 18.0* 21.0* 13.0* 16.0* 17.0*   ANION GAP 15.0 12.0 17.0* 15.0 14.0   BUN 48* 40* 28* 33* 37*   CREATININE 6.94* 5.94* 3.27* 3.86* 4.49*   EGFR 7.7* 9.3* 18.9* 15.5* 12.9*   GLUCOSE 101* 95 102* 85 91   CALCIUM 7.6* 8.0* 8.4* 8.3* 8.4*   PHOSPHORUS  --  3.4  --  3.7  --          Lab 05/26/24  0509 05/25/24  0343 05/22/24  0310 05/20/24  1820   TOTAL PROTEIN  --  6.3  --  7.6   ALBUMIN 2.4* 2.7* 3.0* 3.4*   GLOBULIN  --  3.6  --  4.2   ALT (SGPT)  --  22  --  9   AST (SGOT)  --  25  --  13   BILIRUBIN  --  0.3  --  0.2   ALK PHOS  --  108  --  101         Lab 05/25/24  0343   HSTROP T 31*                 Brief Urine Lab Results  (Last result in the past 365 days)        Color   Clarity   Blood   Leuk Est   Nitrite   Protein   CREAT   Urine HCG        05/27/24 1202 Yellow   Clear   Trace   Large (3+)   Negative   100 mg/dL (2+)                   Cultures:  Blood Culture   Date Value Ref Range Status   05/25/2024 No growth at 24 hours  Preliminary   05/25/2024 No growth at 24 hours  Preliminary   05/20/2024 No growth at 5 days  Final   05/20/2024 No growth at 5 days  Final   05/20/2024 No growth at 5 days  Final   05/19/2024 No growth at 5 days  Final   05/19/2024 No growth at 5 days  Final     Urine Culture   Date Value Ref Range Status   05/19/2024 No growth  Final       Microbiology Results Abnormal       Procedure Component Value - Date/Time    Blood Culture - Blood, Hand, Left [[id number]]  (Normal) Collected: 05/25/24 0506    Lab Status: Preliminary result Specimen: Blood from Hand, Left Updated: 05/27/24 0731     Blood Culture No growth at 2 days    Narrative:      Less than seven (7) mL's of blood was collected.  Insufficient quantity may yield false negative results.    Blood Culture -  Blood, Hand, Right [[id number]]  (Normal) Collected: 05/25/24 0506    Lab Status: Preliminary result Specimen: Blood from Hand, Right Updated: 05/27/24 0731     Blood Culture No growth at 2 days    Narrative:      Less than seven (7) mL's of blood was collected.  Insufficient quantity may yield false negative results.    Urine Culture - Urine, Urine, Clean Catch [[id number]]  (Normal) Collected: 05/25/24 0354    Lab Status: Final result Specimen: Urine, Clean Catch Updated: 05/26/24 1220     Urine Culture No growth            CT Abdomen Pelvis Without Contrast    Result Date: 5/25/2024  CT ABDOMEN PELVIS WO CONTRAST Date of Exam: 5/25/2024 3:09 PM EDT Indication: recent c diff, n/v. elevated wbc. Comparison: 5/19/2024 Technique: Axial CT images were obtained of the abdomen and pelvis without the administration of contrast. Reconstructed coronal and sagittal images were also obtained. Automated exposure control and iterative construction methods were used. Findings: Limited views of the lung bases demonstrates mild groundglass opacity in the left lung base as well as residual right base effusion. The liver is unremarkable. The gallbladder is normal. The pancreas is normal. Spleen is normal. Bilateral adrenal glands are normal. Bilateral kidneys are normal. The bladder is unremarkable. Sigmoid colon and rectum are unremarkable. The descending colon is unremarkable. There is wall thickening and inflammatory changes surrounding the a sending colon. The small bowel is unremarkable. No evidence of abscess is identified. The appendix is normal. The small bowel is unremarkable. The stomach is unremarkable. Prominent vascular calcifications. No adenopathy is identified. There is stranding within the pararenal fat as well as in the mesentery and along the paracolic gutters. This may be secondary to anasarca. No aggressive appearing lytic or sclerotic bone lesions.     Impression: Impression: 1. Persistent right effusion. 2.  Increased inflammatory changes involving the predominantly ascending colon. Findings are most consistent with colitis. 2. Increased edema within the fat likely reflects anasarca. Electronically Signed: Micha Millan MD  5/25/2024 6:27 PM EDT  Workstation ID: [id number]     Results for orders placed during the hospital encounter of 05/05/24    Adult Transesophageal Echo 3D (DAMIÁN) W/ Cont If Necessary Per Protocol    Interpretation Summary    Left ventricular systolic function is normal. Left ventricular ejection fraction appears to be 61 - 65%. Normal left ventricular cavity size noted. Left ventricular wall thickness is consistent with mild concentric hypertrophy. All left ventricular wall segments contract normally.    There is mild calcification of the aortic valve. The aortic valve appears trileaflet. Mild aortic valve regurgitation is present. No aortic valve stenosis is present. There is no evidence of an aortic valve mass is present.    There is mild calcification of the mitral valve. There is mild, bileaflet mitral valve thickening present. No evidence of a mitral valve mass is present. Mild to moderate mitral valve regurgitation is present. No significant mitral valve stenosis is present.    The tricuspid valve is structurally normal with no significant stenosis present. There is no evidence of a mass on the tricuspid valve. Trace tricuspid valve regurgitation is present.    The pulmonic valve is grossly normal in structure. There is trace pulmonic valve regurgitation present.    No vegetation seen on atrial aspect of pacemaker leads.  The most distal aspects of the RV lead were not completely visualized however there was no apparent vegetation in the more proximal segment, adjacent to the tricuspid valve which appears to be functioning normally.      Current medications:  Scheduled Meds:budesonide-formoterol, 2 puff, Inhalation, BID - RT   And  tiotropium bromide monohydrate, 2 puff, Inhalation, Daily -  RT  ferrous sulfate, 325 mg, Oral, Daily With Breakfast  fludrocortisone, 50 mcg, Oral, Daily  megestrol, 20 mg, Oral, Daily  pravastatin, 80 mg, Oral, Nightly  sodium bicarbonate, 1,300 mg, Oral, TID  vancomycin, 125 mg, Oral, Q6H      Continuous Infusions:sodium chloride, 100 mL/hr, Last Rate: 100 mL/hr (05/27/24 0900)      PRN Meds:.  acetaminophen **OR** acetaminophen **OR** acetaminophen    albuterol sulfate HFA    midodrine    nitroglycerin    ondansetron    Assessment & Plan   Assessment & Plan     Active Hospital Problems    Diagnosis  POA    **Hx of hypotension [Z86.79]  Not Applicable    Hypotension [I95.9]  Yes    Dehydration [E86.0]  Yes    C. difficile colitis [A04.72]  Yes    UTI (urinary tract infection) [N39.0]  Unknown    Stage 4 chronic kidney disease [N18.4]  Yes    CAD (coronary artery disease) [I25.10]  Yes    ARACELI (acute kidney injury) [N17.9]  Yes    GERD without esophagitis [K21.9]  Yes    Malignant neoplasm of lower lobe of right lung [C34.31]  Yes    Centrilobular emphysema [J43.2]  Yes    Tobacco abuse [Z72.0]  Yes    Mixed hyperlipidemia [E78.2]  Yes      Resolved Hospital Problems   No resolved problems to display.        Brief Hospital Course to date:  - detailed assessment and plan from admission reviewed  -History of non-small cell lung cancer admitted for low BP, nausea, vomiting and generalized weakness.     Hypotension, fluid responsive  - BP improved after IV fluids  - Recent workup included TTE, appropriate cortisol, normal TSH, ACTH stimulation test was negative  - Continue IV fluids  - Encourage oral intake  - Continue midodrine  --05/26: Spoke with Dr. Mistry who is cleared the patient to be started on steroids if needed.  Dr. Ríos, started fludrocortisone along with midodrine and IV fluids     ARACELI on CKD stage IV  Metabolic acidosis  --Appreciate nephrology input.  --creatinine trending up.  Continue IVF.  Nephro following.        C. difficile colitis  - Continue oral vancomycin  125 mg every 6 hours for total of 2 weeks  - ID consulted.  - CT abdomen/pelvis without contrast c/w colitis predominantly ascending     Leukocytosis  - Trending down.  - UA noted  - Continue oral vancomycin  - Per ID hold on further IV antibiotics at this time        NSCLC  - Status post neoadjuvant chemo, RLL lobectomy 1/20/2024, immunotherapy (last dose of optivo 4/4/2024)  - Follows with Dr. Ashley  -Per significant other all treatment on hold currently     CAD  AV block status post PPM  Hx hypertension  - Continue statin, hold amlodipine due to hypotension     Chronic anemia  - Monitor CBC  - Continue iron supplement     Emphysema  Tobacco abuse  -Trelegy    Expected Discharge Location and Transportation: home with [facility]  Expected Discharge   Expected Discharge Date: 5/28/2024; Expected Discharge Time:      DVT prophylaxis:  Mechanical DVT prophylaxis orders are present.         AM-PAC 6 Clicks Score (PT): 19 (05/27/24 0800)    CODE STATUS:   Code Status and Medical Interventions:   Ordered at: 05/25/24 0618     Level Of Support Discussed With:    Patient     Code Status (Patient has no pulse and is not breathing):    CPR (Attempt to Resuscitate)     Medical Interventions (Patient has pulse or is breathing):    Full Support       Buddy Lawrence MD  05/27/24

## 2024-05-27 NOTE — PLAN OF CARE
Goal Outcome Evaluation:              Outcome Evaluation: Patient A&O x4 able to voice wants and needs to staff has denied pain. Has been up and was assisted with getting a bath per family member. He has been up in the chair without any problems noted, Has alarms on and in place with call light within reach.

## 2024-05-27 NOTE — PROGRESS NOTES
"   LOS: 0 days    Patient Care Team:  Hayley Castellanos APRN as PCP - General (Nurse Practitioner)  Octaviano Sampson MD as Consulting Physician (Pulmonary Disease)  Neetu Ashley MD as Referring Physician (Hematology and Oncology)  Nimo Rodríguez MD as Consulting Physician (Radiation Oncology)    Chief Complaint:  Low BP     Subjective     Interval History:     No acute events overnight. No new complaints   BP much better on Florinef      Review of Systems:   No CP or SOA , fever, chills, rigors, rash, N/V, Constipation.       Objective     Vital Sign Min/Max for last 24 hours  Temp  Min: 97.7 °F (36.5 °C)  Max: 98.9 °F (37.2 °C)   BP  Min: 95/66  Max: 175/85   Pulse  Min: 69  Max: 77   Resp  Min: 16  Max: 20   SpO2  Min: 94 %  Max: 100 %   No data recorded   No data recorded     Flowsheet Rows      Flowsheet Row First Filed Value   Admission Height 182.9 cm (72\") Documented at 05/25/2024 0315   Admission Weight 78 kg (172 lb) Documented at 05/25/2024 0315            No intake/output data recorded.  I/O last 3 completed shifts:  In: 525 [P.O.:525]  Out: 100 [Urine:100]    Physical Exam:    Gen: Alert, NAD   HENT: NC, AT, EOMI   NECK: Supple, no JVD, Trachea midline   LUNGS: CTA bilaterally, non labored respirtation   CVS: S1/S2 audible, RRR, no murmur   Abd: Soft, NT, ND, BS+   Ext: No pedal edema, no cyanosis   CNS: Alert, No focal deficit noted grossly  Psy: Cooperative  Skin: Warm, dry and intact      WBC WBC   Date Value Ref Range Status   05/27/2024 21.90 (H) 3.40 - 10.80 10*3/mm3 Final   05/26/2024 23.95 (H) 3.40 - 10.80 10*3/mm3 Final   05/25/2024 30.99 (C) 3.40 - 10.80 10*3/mm3 Final      HGB Hemoglobin   Date Value Ref Range Status   05/27/2024 8.0 (L) 13.0 - 17.7 g/dL Final   05/26/2024 8.4 (L) 13.0 - 17.7 g/dL Final   05/25/2024 10.3 (L) 13.0 - 17.7 g/dL Final      HCT Hematocrit   Date Value Ref Range Status   05/27/2024 24.8 (L) 37.5 - 51.0 % Final   05/26/2024 25.5 (L) 37.5 - 51.0 % Final " "  05/25/2024 31.6 (L) 37.5 - 51.0 % Final      Platlets No results found for: \"LABPLAT\"   MCV MCV   Date Value Ref Range Status   05/27/2024 91.5 79.0 - 97.0 fL Final   05/26/2024 90.1 79.0 - 97.0 fL Final   05/25/2024 88.5 79.0 - 97.0 fL Final          Sodium Sodium   Date Value Ref Range Status   05/27/2024 132 (L) 136 - 145 mmol/L Final   05/26/2024 133 (L) 136 - 145 mmol/L Final   05/25/2024 135 (L) 136 - 145 mmol/L Final      Potassium Potassium   Date Value Ref Range Status   05/27/2024 4.2 3.5 - 5.2 mmol/L Final   05/26/2024 4.1 3.5 - 5.2 mmol/L Final   05/25/2024 3.9 3.5 - 5.2 mmol/L Final     Comment:     Slight hemolysis detected by analyzer. Result may be falsely elevated.      Chloride Chloride   Date Value Ref Range Status   05/27/2024 99 98 - 107 mmol/L Final   05/26/2024 100 98 - 107 mmol/L Final   05/25/2024 105 98 - 107 mmol/L Final      CO2 CO2   Date Value Ref Range Status   05/27/2024 18.0 (L) 22.0 - 29.0 mmol/L Final   05/26/2024 21.0 (L) 22.0 - 29.0 mmol/L Final   05/25/2024 13.0 (L) 22.0 - 29.0 mmol/L Final      BUN BUN   Date Value Ref Range Status   05/27/2024 48 (H) 8 - 23 mg/dL Final   05/26/2024 40 (H) 8 - 23 mg/dL Final   05/25/2024 28 (H) 8 - 23 mg/dL Final      Creatinine Creatinine   Date Value Ref Range Status   05/27/2024 6.94 (H) 0.76 - 1.27 mg/dL Final   05/26/2024 5.94 (H) 0.76 - 1.27 mg/dL Final   05/25/2024 3.27 (H) 0.76 - 1.27 mg/dL Final      Calcium Calcium   Date Value Ref Range Status   05/27/2024 7.6 (L) 8.6 - 10.5 mg/dL Final   05/26/2024 8.0 (L) 8.6 - 10.5 mg/dL Final   05/25/2024 8.4 (L) 8.6 - 10.5 mg/dL Final      PO4 No results found for: \"CAPO4\"   Albumin Albumin   Date Value Ref Range Status   05/26/2024 2.4 (L) 3.5 - 5.2 g/dL Final   05/25/2024 2.7 (L) 3.5 - 5.2 g/dL Final      Magnesium No results found for: \"MG\"   Uric Acid No results found for: \"URICACID\"        Results Review:     I reviewed the patient's new clinical results.    budesonide-formoterol, 2 puff, " Inhalation, BID - RT   And  tiotropium bromide monohydrate, 2 puff, Inhalation, Daily - RT  ferrous sulfate, 325 mg, Oral, Daily With Breakfast  fludrocortisone, 50 mcg, Oral, Daily  megestrol, 20 mg, Oral, Daily  pravastatin, 80 mg, Oral, Nightly  sodium bicarbonate, 1,300 mg, Oral, TID  vancomycin, 125 mg, Oral, Q6H      sodium chloride, 75 mL/hr, Last Rate: 75 mL/hr (05/26/24 1604)        Medication Review: yes    Results from last 7 days   Lab Units 05/27/24  0628 05/26/24  0509 05/25/24  0343 05/23/24  0715 05/22/24  0310 05/21/24  0448 05/20/24  1820   SODIUM mmol/L 132* 133* 135*  --  133*   < > 133*   POTASSIUM mmol/L 4.2 4.1 3.9  --  4.2   < > 4.4   CHLORIDE mmol/L 99 100 105  --  102   < > 101   CO2 mmol/L 18.0* 21.0* 13.0*  --  16.0*   < > 17.0*   BUN mg/dL 48* 40* 28*  --  33*   < > 36*   CREATININE mg/dL 6.94* 5.94* 3.27*  --  3.86*   < > 4.57*   CALCIUM mg/dL 7.6* 8.0* 8.4*  --  8.3*   < > 8.9   ALBUMIN g/dL  --  2.4* 2.7*  --  3.0*  --  3.4*   WBC 10*3/mm3 21.90* 23.95* 30.99* 15.69* 15.36*   < > 22.57*   HEMOGLOBIN g/dL 8.0* 8.4* 10.3* 9.0* 9.2*   < > 9.5*   PLATELETS 10*3/mm3 235 227 294 269 290   < > 324    < > = values in this interval not displayed.           Assessment & Plan       Hx of hypotension    Mixed hyperlipidemia    Centrilobular emphysema    Tobacco abuse    Malignant neoplasm of lower lobe of right lung    GERD without esophagitis    CAD (coronary artery disease)    Stage 4 chronic kidney disease    Hypotension    Dehydration    C. difficile colitis    UTI (urinary tract infection)    1.  ARACELI on CKD stage IV - Scr 4.49- stable. creatinine peaked around 7.6 to last admission, patient went home with a creatinine slowly coming down to 5 range.  Serologic workup -ANCA (-), Unclear etiology - DDx- AIN - vancomycin associated nephrotoxicity vs pre-renal/atn from hypotension.   2.  Proteinuria   3.  Hypotension - Cortisol level - 15.44 and ACTH pending. level 2 weeks back was 26.33 -  Adrenal insufficiency reported with Opdivo in literature along with other endocrine abnormalities. Per wife, he has salt craving and weakness even before Opdivo. ACTH stimulation test result normal, DHEA- normal, renin viola level pending  4.  Hyponatremia    5. Anemia   6.  Metabolic acidosis.   7.  Infectious disease: Patient's followed with ID consultants.  8.  S/p immunotherapy for non-small cell cancer last infusion 4/4/2024 Opdivo.  9- 10- C diff colitis - on vancomycin.      Recommendation:  - Repeat UA and urine lytes   - renal ultrasound   - Vanc level   - IV fluids   - Continue with Florinef 50 mg daily - BP better   - Continue with midodrine as needed   - Sodium bicarb tablets  - Monitor I/O   - Avoid nephrotoxic agents.   - Renal diet   - Adjust meds per renal function   - No emergent need of RRT   - Monitor H/H and transfuse for Hgb less than 7.0      High risk complex patient multiple medical problems.    Kurt Ríos MD  05/27/24  08:48 EDT

## 2024-05-28 ENCOUNTER — APPOINTMENT (OUTPATIENT)
Dept: ULTRASOUND IMAGING | Facility: HOSPITAL | Age: 74
DRG: 372 | End: 2024-05-28
Payer: MEDICARE

## 2024-05-28 LAB
ALBUMIN SERPL-MCNC: 2.5 G/DL (ref 3.5–5.2)
ANION GAP SERPL CALCULATED.3IONS-SCNC: 15 MMOL/L (ref 5–15)
BUN SERPL-MCNC: 51 MG/DL (ref 8–23)
BUN/CREAT SERPL: 6.5 (ref 7–25)
CALCIUM SPEC-SCNC: 7.9 MG/DL (ref 8.6–10.5)
CHLORIDE SERPL-SCNC: 96 MMOL/L (ref 98–107)
CO2 SERPL-SCNC: 19 MMOL/L (ref 22–29)
CREAT SERPL-MCNC: 7.9 MG/DL (ref 0.76–1.27)
DEPRECATED RDW RBC AUTO: 54.3 FL (ref 37–54)
EGFRCR SERPLBLD CKD-EPI 2021: 6.6 ML/MIN/1.73
ERYTHROCYTE [DISTWIDTH] IN BLOOD BY AUTOMATED COUNT: 16.5 % (ref 12.3–15.4)
FUNGUS WND CULT: NORMAL
GLUCOSE SERPL-MCNC: 123 MG/DL (ref 65–99)
HCT VFR BLD AUTO: 23.8 % (ref 37.5–51)
HGB BLD-MCNC: 7.8 G/DL (ref 13–17.7)
MCH RBC QN AUTO: 29.4 PG (ref 26.6–33)
MCHC RBC AUTO-ENTMCNC: 32.8 G/DL (ref 31.5–35.7)
MCV RBC AUTO: 89.8 FL (ref 79–97)
MYCOBACTERIUM SPEC CULT: NORMAL
NIGHT BLUE STAIN TISS: NORMAL
PHOSPHATE SERPL-MCNC: 4.1 MG/DL (ref 2.5–4.5)
PLATELET # BLD AUTO: 233 10*3/MM3 (ref 140–450)
PMV BLD AUTO: 8.8 FL (ref 6–12)
POTASSIUM SERPL-SCNC: 4 MMOL/L (ref 3.5–5.2)
RBC # BLD AUTO: 2.65 10*6/MM3 (ref 4.14–5.8)
SODIUM SERPL-SCNC: 130 MMOL/L (ref 136–145)
WBC NRBC COR # BLD AUTO: 16.03 10*3/MM3 (ref 3.4–10.8)

## 2024-05-28 PROCEDURE — 97530 THERAPEUTIC ACTIVITIES: CPT

## 2024-05-28 PROCEDURE — 97116 GAIT TRAINING THERAPY: CPT

## 2024-05-28 PROCEDURE — 85027 COMPLETE CBC AUTOMATED: CPT | Performed by: INTERNAL MEDICINE

## 2024-05-28 PROCEDURE — 99232 SBSQ HOSP IP/OBS MODERATE 35: CPT | Performed by: INTERNAL MEDICINE

## 2024-05-28 PROCEDURE — 94799 UNLISTED PULMONARY SVC/PX: CPT

## 2024-05-28 PROCEDURE — 25810000003 SODIUM CHLORIDE 0.9 % SOLUTION: Performed by: INTERNAL MEDICINE

## 2024-05-28 PROCEDURE — 94664 DEMO&/EVAL PT USE INHALER: CPT

## 2024-05-28 PROCEDURE — 76775 US EXAM ABDO BACK WALL LIM: CPT

## 2024-05-28 PROCEDURE — 80069 RENAL FUNCTION PANEL: CPT | Performed by: INTERNAL MEDICINE

## 2024-05-28 PROCEDURE — 94761 N-INVAS EAR/PLS OXIMETRY MLT: CPT

## 2024-05-28 RX ORDER — UREA 10 %
5 LOTION (ML) TOPICAL NIGHTLY PRN
Status: DISCONTINUED | OUTPATIENT
Start: 2024-05-28 | End: 2024-05-31 | Stop reason: HOSPADM

## 2024-05-28 RX ADMIN — FLUDROCORTISONE ACETATE 50 MCG: 0.1 TABLET ORAL at 08:41

## 2024-05-28 RX ADMIN — VANCOMYCIN HYDROCHLORIDE 125 MG: 125 CAPSULE ORAL at 13:00

## 2024-05-28 RX ADMIN — FERROUS SULFATE TAB 325 MG (65 MG ELEMENTAL FE) 325 MG: 325 (65 FE) TAB at 08:34

## 2024-05-28 RX ADMIN — Medication 5 MG: at 01:28

## 2024-05-28 RX ADMIN — SODIUM CHLORIDE 100 ML/HR: 9 INJECTION, SOLUTION INTRAVENOUS at 03:51

## 2024-05-28 RX ADMIN — MEGESTROL ACETATE 20 MG: 20 TABLET ORAL at 08:41

## 2024-05-28 RX ADMIN — SODIUM BICARBONATE 650 MG TABLET 1300 MG: at 18:47

## 2024-05-28 RX ADMIN — SODIUM BICARBONATE 650 MG TABLET 1300 MG: at 20:28

## 2024-05-28 RX ADMIN — BUDESONIDE AND FORMOTEROL FUMARATE DIHYDRATE 2 PUFF: 160; 4.5 AEROSOL RESPIRATORY (INHALATION) at 09:43

## 2024-05-28 RX ADMIN — VANCOMYCIN HYDROCHLORIDE 125 MG: 125 CAPSULE ORAL at 01:05

## 2024-05-28 RX ADMIN — BUDESONIDE AND FORMOTEROL FUMARATE DIHYDRATE 2 PUFF: 160; 4.5 AEROSOL RESPIRATORY (INHALATION) at 20:41

## 2024-05-28 RX ADMIN — VANCOMYCIN HYDROCHLORIDE 125 MG: 125 CAPSULE ORAL at 06:42

## 2024-05-28 RX ADMIN — SODIUM BICARBONATE 650 MG TABLET 1300 MG: at 08:34

## 2024-05-28 RX ADMIN — PRAVASTATIN SODIUM 80 MG: 40 TABLET ORAL at 20:28

## 2024-05-28 RX ADMIN — SODIUM CHLORIDE 100 ML/HR: 9 INJECTION, SOLUTION INTRAVENOUS at 16:35

## 2024-05-28 RX ADMIN — TIOTROPIUM BROMIDE INHALATION SPRAY 2 PUFF: 3.12 SPRAY, METERED RESPIRATORY (INHALATION) at 09:44

## 2024-05-28 RX ADMIN — VANCOMYCIN HYDROCHLORIDE 125 MG: 125 CAPSULE ORAL at 18:48

## 2024-05-28 NOTE — PROGRESS NOTES
[facility]  PROGRESS NOTE    Patient Name: David Barfield  : 1950  MRN: [id number]    Date of Admission: 2024  Primary Care Physician: Hayley Castellanos APRN    Subjective   Subjective     CC:  Low BP    HPI:  Feels good. States that has been urinating more.       Objective   Objective     Vital Signs:   Temp:  [98.4 °F (36.9 °C)-98.7 °F (37.1 °C)] 98.7 °F (37.1 °C)  Heart Rate:  [70-84] 70  Resp:  [16-18] 16  BP: (143-180)/() 168/92     Physical Exam:  Constitutional:  male no acute distress, awake, alert  HENT: NCAT, mucous membranes moist  Respiratory: Clear to auscultation bilaterally, respiratory effort normal   Cardiovascular: RRR, no murmurs, rubs, or gallops  Gastrointestinal: Positive bowel sounds, soft, nontender, nondistended  Musculoskeletal: No bilateral ankle edema  Psychiatric: Appropriate affect, cooperative  Neurologic: Oriented x 3, delayed thought, speech clear  Skin: No rashes    Results Reviewed:  LAB RESULTS:      Lab 24  0839 24  0857 24  0628 24  0509 24  0718 24  0343 24  0715 24  0310   WBC 16.03*  --  21.90* 23.95*  --  30.99* 15.69* 15.36*   HEMOGLOBIN 7.8*  --  8.0* 8.4*  --  10.3* 9.0* 9.2*   HEMATOCRIT 23.8*  --  24.8* 25.5*  --  31.6* 27.6* 28.1*   PLATELETS 233  --  235 227  --  294 269 290   NEUTROS ABS  --   --   --  20.34*  --  28.51* 12.05* 11.94*   IMMATURE GRANS (ABS)  --   --   --  0.38*  --   --  0.31* 0.33*   LYMPHS ABS  --   --   --  1.80  --   --  1.35 1.20   MONOS ABS  --   --   --  1.35*  --   --  1.89* 1.81*   EOS ABS  --   --   --  0.02  --  0.00 0.05 0.05   MCV 89.8  --  91.5 90.1  --  88.5 89.6 93.4   LACTATE  --   --   --   --  2.1* 2.3*  --   --    LDH  --  181  --   --   --   --   --   --          Lab 24  0839 24  0628 24  0509 24  0343 24  0310   SODIUM 130* 132* 133* 135* 133*   POTASSIUM 4.0 4.2 4.1 3.9 4.2    CHLORIDE 96* 99 100 105 102   CO2 19.0* 18.0* 21.0* 13.0* 16.0*   ANION GAP 15.0 15.0 12.0 17.0* 15.0   BUN 51* 48* 40* 28* 33*   CREATININE 7.90* 6.94* 5.94* 3.27* 3.86*   EGFR 6.6* 7.7* 9.3* 18.9* 15.5*   GLUCOSE 123* 101* 95 102* 85   CALCIUM 7.9* 7.6* 8.0* 8.4* 8.3*   PHOSPHORUS 4.1  --  3.4  --  3.7         Lab 05/28/24  0839 05/26/24  0509 05/25/24  0343 05/22/24  0310   TOTAL PROTEIN  --   --  6.3  --    ALBUMIN 2.5* 2.4* 2.7* 3.0*   GLOBULIN  --   --  3.6  --    ALT (SGPT)  --   --  22  --    AST (SGOT)  --   --  25  --    BILIRUBIN  --   --  0.3  --    ALK PHOS  --   --  108  --          Lab 05/25/24  0343   HSTROP T 31*                 Brief Urine Lab Results  (Last result in the past 365 days)        Color   Clarity   Blood   Leuk Est   Nitrite   Protein   CREAT   Urine HCG        05/27/24 1202             25.0         05/27/24 1202 Yellow   Clear   Trace   Large (3+)   Negative   100 mg/dL (2+)                   Cultures:  Blood Culture   Date Value Ref Range Status   05/25/2024 No growth at 24 hours  Preliminary   05/25/2024 No growth at 24 hours  Preliminary   05/20/2024 No growth at 5 days  Final   05/20/2024 No growth at 5 days  Final   05/20/2024 No growth at 5 days  Final   05/19/2024 No growth at 5 days  Final   05/19/2024 No growth at 5 days  Final     Urine Culture   Date Value Ref Range Status   05/19/2024 No growth  Final       Microbiology Results Abnormal       Procedure Component Value - Date/Time    Blood Culture - Blood, Hand, Left [[id number]]  (Normal) Collected: 05/25/24 0506    Lab Status: Preliminary result Specimen: Blood from Hand, Left Updated: 05/28/24 0730     Blood Culture No growth at 3 days    Narrative:      Less than seven (7) mL's of blood was collected.  Insufficient quantity may yield false negative results.    Blood Culture - Blood, Hand, Right [[id number]]  (Normal) Collected: 05/25/24 0500    Lab Status: Preliminary result Specimen: Blood from Hand, Right Updated:  05/28/24 0730     Blood Culture No growth at 3 days    Narrative:      Less than seven (7) mL's of blood was collected.  Insufficient quantity may yield false negative results.    Eosinophil Smear - Urine, Urine, Clean Catch [[id number]]  (Normal) Collected: 05/27/24 1202    Lab Status: Final result Specimen: Urine, Clean Catch Updated: 05/27/24 1251     Eosinophil Smear 0 % EOS/100 Cells     Narrative:      No eosinophil seen    Urine Culture - Urine, Urine, Clean Catch [[id number]]  (Normal) Collected: 05/25/24 0354    Lab Status: Final result Specimen: Urine, Clean Catch Updated: 05/26/24 1220     Urine Culture No growth            US Renal Bilateral    Result Date: 5/28/2024  US RENAL BILATERAL Date of Exam: 5/28/2024 11:12 AM EDT Indication: ARACELI. Comparison: No comparisons available. Technique: Grayscale and color Doppler ultrasound evaluation of the kidneys and urinary bladder was performed. Findings: Kidneys are normal in size and shape bilaterally. Note is made of a right pleural effusion. There is no right hydronephrosis. There is caliectasis of the left upper pole kidney. There is normal blood flow to both kidneys. There are no renal masses. Ureteral jets appear normal. There is moderate prostate enlargement. There are prostate calcifications.     Impression: Impression: Caliectasis of the left upper pole kidney. Right pleural effusion. Enlarged prostate gland with calcifications. Electronically Signed: Elsa Schmid MD  5/28/2024 12:38 PM EDT  Workstation ID: [id number]     Results for orders placed during the hospital encounter of 05/05/24    Adult Transesophageal Echo 3D (DAMIÁN) W/ Cont If Necessary Per Protocol    Interpretation Summary    Left ventricular systolic function is normal. Left ventricular ejection fraction appears to be 61 - 65%. Normal left ventricular cavity size noted. Left ventricular wall thickness is consistent with mild concentric hypertrophy. All left ventricular wall segments contract  normally.    There is mild calcification of the aortic valve. The aortic valve appears trileaflet. Mild aortic valve regurgitation is present. No aortic valve stenosis is present. There is no evidence of an aortic valve mass is present.    There is mild calcification of the mitral valve. There is mild, bileaflet mitral valve thickening present. No evidence of a mitral valve mass is present. Mild to moderate mitral valve regurgitation is present. No significant mitral valve stenosis is present.    The tricuspid valve is structurally normal with no significant stenosis present. There is no evidence of a mass on the tricuspid valve. Trace tricuspid valve regurgitation is present.    The pulmonic valve is grossly normal in structure. There is trace pulmonic valve regurgitation present.    No vegetation seen on atrial aspect of pacemaker leads.  The most distal aspects of the RV lead were not completely visualized however there was no apparent vegetation in the more proximal segment, adjacent to the tricuspid valve which appears to be functioning normally.      Current medications:  Scheduled Meds:budesonide-formoterol, 2 puff, Inhalation, BID - RT   And  tiotropium bromide monohydrate, 2 puff, Inhalation, Daily - RT  ferrous sulfate, 325 mg, Oral, Daily With Breakfast  fludrocortisone, 50 mcg, Oral, Daily  megestrol, 20 mg, Oral, Daily  pravastatin, 80 mg, Oral, Nightly  sodium bicarbonate, 1,300 mg, Oral, TID  vancomycin, 125 mg, Oral, Q6H      Continuous Infusions:sodium chloride, 100 mL/hr, Last Rate: 100 mL/hr (05/28/24 1303)      PRN Meds:.  acetaminophen **OR** acetaminophen **OR** acetaminophen    albuterol sulfate HFA    melatonin    midodrine    nitroglycerin    ondansetron    Assessment & Plan   Assessment & Plan     Active Hospital Problems    Diagnosis  POA    **Hx of hypotension [Z86.79]  Not Applicable    Hypotension [I95.9]  Yes    Dehydration [E86.0]  Yes    C. difficile colitis [A04.72]  Yes    UTI  (urinary tract infection) [N39.0]  Unknown    Stage 4 chronic kidney disease [N18.4]  Yes    CAD (coronary artery disease) [I25.10]  Yes    ARACELI (acute kidney injury) [N17.9]  Yes    GERD without esophagitis [K21.9]  Yes    Malignant neoplasm of lower lobe of right lung [C34.31]  Yes    Centrilobular emphysema [J43.2]  Yes    Tobacco abuse [Z72.0]  Yes    Mixed hyperlipidemia [E78.2]  Yes      Resolved Hospital Problems   No resolved problems to display.        Brief Hospital Course to date:  - detailed assessment and plan from admission reviewed  -History of non-small cell lung cancer admitted for low BP, nausea, vomiting and generalized weakness.     Hypotension, fluid responsive  - BP improved after IV fluids  - Recent workup included TTE, appropriate cortisol, normal TSH, ACTH stimulation test was negative  - Continue IV fluids  - Encourage oral intake  - Continue midodrine  --05/26: Spoke with Dr. Mistry who is cleared the patient to be started on steroids if needed.  Dr. Ríos, started fludrocortisone along with midodrine and IV fluids     ARACELI on CKD stage IV  Metabolic acidosis  --Appreciate nephrology input.  --creatinine continuing to rend up.  Continue IVF.  Nephro following.   --renal u/s shows caliectasis of left upper pole kidney.  ?need for  consult defer to nephrology       C. difficile colitis  - Continue oral vancomycin 125 mg every 6 hours for total of 2 weeks  - ID consulted.  - CT abdomen/pelvis without contrast c/w colitis predominantly ascending     Leukocytosis  - Trending down.  - UA noted  - Continue oral vancomycin  - Per ID hold on further IV antibiotics at this time        NSCLC  - Status post neoadjuvant chemo, RLL lobectomy 1/20/2024, immunotherapy (last dose of optivo 4/4/2024)  - Follows with Dr. Ashley  -Per significant other all treatment on hold currently     CAD  AV block status post PPM  Hx hypertension  - Continue statin, hold amlodipine due to hypotension     Chronic anemia  -  Monitor CBC  - Continue iron supplement     Emphysema  Tobacco abuse  -Trelegy    Expected Discharge Location and Transportation: home with [facility]  Expected Discharge   Expected Discharge Date: 5/30/2024; Expected Discharge Time:      DVT prophylaxis:  Mechanical DVT prophylaxis orders are present.         AM-PAC 6 Clicks Score (PT): 23 (05/28/24 0800)    CODE STATUS:   Code Status and Medical Interventions:   Ordered at: 05/25/24 0618     Level Of Support Discussed With:    Patient     Code Status (Patient has no pulse and is not breathing):    CPR (Attempt to Resuscitate)     Medical Interventions (Patient has pulse or is breathing):    Full Support       Buddy Lawrence MD  05/28/24

## 2024-05-28 NOTE — PLAN OF CARE
Goal Outcome Evaluation:  Plan of Care Reviewed With: patient, significant other        Progress: improving  Outcome Evaluation: Pt with good effort and increased ambulation distance to 100' with CGA and no AD. Orthostatics taken with significant drop noted, however pt asymptomatic throughout. Pt continues to present below his functional baseline with weakness, impaired endurance, and generalized deconditioning. Further IPPT is warrented. PT will progress as able per POC.      Anticipated Discharge Disposition (PT): home with assist, home with outpatient therapy services

## 2024-05-28 NOTE — PROGRESS NOTES
"   LOS: 1 day    Patient Care Team:  Hayley Castellanos APRN as PCP - General (Nurse Practitioner)  Octaviano Sampson MD as Consulting Physician (Pulmonary Disease)  Neetu Ashley MD as Referring Physician (Hematology and Oncology)  Nimo Rodríguez MD as Consulting Physician (Radiation Oncology)  [place], [place] as Ambulatory  ([facility])    Chief Complaint:  Low BP     Subjective     Interval History:     No new events no added distress.  Renal function slightly worse.  BP much better on Florinef      Review of Systems:   Patient denies shortness of breath, chest pain, dysuria, hematuria, nausea, vomiting.        Objective     Vital Sign Min/Max for last 24 hours  Temp  Min: 98.4 °F (36.9 °C)  Max: 98.7 °F (37.1 °C)   BP  Min: 143/84  Max: 180/96   Pulse  Min: 70  Max: 84   Resp  Min: 16  Max: 18   SpO2  Min: 94 %  Max: 100 %   No data recorded   Weight  Min: 84 kg (185 lb 3.2 oz)  Max: 84 kg (185 lb 3.2 oz)     Flowsheet Rows      Flowsheet Row First Filed Value   Admission Height 182.9 cm (72\") Documented at 05/25/2024 0315   Admission Weight 78 kg (172 lb) Documented at 05/25/2024 0315            I/O this shift:  In: 240 [P.O.:240]  Out: -   I/O last 3 completed shifts:  In: 1125 [P.O.:1125]  Out: 2150 [Urine:2150]    Physical Exam:    General Appearance: Alert, oriented, no obvious distress.  Eyes: PER, EOMI.  Neck: Supple no JVD.  Lungs: Clear auscultation, no rales rhonchi's, equal chest movement, nonlabored.  Heart: No gallop, murmur, rub, RRR.  Abdomen: Soft, nontender, positive bowel sounds, no organomegaly.  Extremities: No edema, no cyanosis.  Neuro: No focal deficit, moving all extremities, alert oriented X 3        WBC WBC   Date Value Ref Range Status   05/28/2024 16.03 (H) 3.40 - 10.80 10*3/mm3 Final   05/27/2024 21.90 (H) 3.40 - 10.80 10*3/mm3 Final   05/26/2024 23.95 (H) 3.40 - 10.80 10*3/mm3 Final      HGB Hemoglobin   Date Value Ref Range Status   05/28/2024 7.8 (L) " "13.0 - 17.7 g/dL Final   05/27/2024 8.0 (L) 13.0 - 17.7 g/dL Final   05/26/2024 8.4 (L) 13.0 - 17.7 g/dL Final      HCT Hematocrit   Date Value Ref Range Status   05/28/2024 23.8 (L) 37.5 - 51.0 % Final   05/27/2024 24.8 (L) 37.5 - 51.0 % Final   05/26/2024 25.5 (L) 37.5 - 51.0 % Final      Platlets No results found for: \"LABPLAT\"   MCV MCV   Date Value Ref Range Status   05/28/2024 89.8 79.0 - 97.0 fL Final   05/27/2024 91.5 79.0 - 97.0 fL Final   05/26/2024 90.1 79.0 - 97.0 fL Final          Sodium Sodium   Date Value Ref Range Status   05/28/2024 130 (L) 136 - 145 mmol/L Final   05/27/2024 132 (L) 136 - 145 mmol/L Final   05/26/2024 133 (L) 136 - 145 mmol/L Final      Potassium Potassium   Date Value Ref Range Status   05/28/2024 4.0 3.5 - 5.2 mmol/L Final   05/27/2024 4.2 3.5 - 5.2 mmol/L Final   05/26/2024 4.1 3.5 - 5.2 mmol/L Final      Chloride Chloride   Date Value Ref Range Status   05/28/2024 96 (L) 98 - 107 mmol/L Final   05/27/2024 99 98 - 107 mmol/L Final   05/26/2024 100 98 - 107 mmol/L Final      CO2 CO2   Date Value Ref Range Status   05/28/2024 19.0 (L) 22.0 - 29.0 mmol/L Final   05/27/2024 18.0 (L) 22.0 - 29.0 mmol/L Final   05/26/2024 21.0 (L) 22.0 - 29.0 mmol/L Final      BUN BUN   Date Value Ref Range Status   05/28/2024 51 (H) 8 - 23 mg/dL Final   05/27/2024 48 (H) 8 - 23 mg/dL Final   05/26/2024 40 (H) 8 - 23 mg/dL Final      Creatinine Creatinine   Date Value Ref Range Status   05/28/2024 7.90 (H) 0.76 - 1.27 mg/dL Final   05/27/2024 6.94 (H) 0.76 - 1.27 mg/dL Final   05/26/2024 5.94 (H) 0.76 - 1.27 mg/dL Final      Calcium Calcium   Date Value Ref Range Status   05/28/2024 7.9 (L) 8.6 - 10.5 mg/dL Final   05/27/2024 7.6 (L) 8.6 - 10.5 mg/dL Final   05/26/2024 8.0 (L) 8.6 - 10.5 mg/dL Final      PO4 No results found for: \"CAPO4\"   Albumin Albumin   Date Value Ref Range Status   05/28/2024 2.5 (L) 3.5 - 5.2 g/dL Final   05/26/2024 2.4 (L) 3.5 - 5.2 g/dL Final      Magnesium No results found " "for: \"MG\"   Uric Acid Uric Acid   Date Value Ref Range Status   05/27/2024 7.2 (H) 3.4 - 7.0 mg/dL Final     Comment:     Falsely depressed results may occur on samples drawn from patients receiving N-Acetylcysteine (NAC) or Metamizole.           Results Review:     I reviewed the patient's new clinical results.    budesonide-formoterol, 2 puff, Inhalation, BID - RT   And  tiotropium bromide monohydrate, 2 puff, Inhalation, Daily - RT  ferrous sulfate, 325 mg, Oral, Daily With Breakfast  fludrocortisone, 50 mcg, Oral, Daily  megestrol, 20 mg, Oral, Daily  pravastatin, 80 mg, Oral, Nightly  sodium bicarbonate, 1,300 mg, Oral, TID  vancomycin, 125 mg, Oral, Q6H      sodium chloride, 100 mL/hr, Last Rate: 100 mL/hr (05/28/24 1303)        Medication Review: yes    Results from last 7 days   Lab Units 05/28/24  0839 05/27/24  0628 05/26/24  0509 05/25/24  0343 05/23/24  0715 05/22/24  0310   SODIUM mmol/L 130* 132* 133* 135*  --  133*   POTASSIUM mmol/L 4.0 4.2 4.1 3.9  --  4.2   CHLORIDE mmol/L 96* 99 100 105  --  102   CO2 mmol/L 19.0* 18.0* 21.0* 13.0*  --  16.0*   BUN mg/dL 51* 48* 40* 28*  --  33*   CREATININE mg/dL 7.90* 6.94* 5.94* 3.27*  --  3.86*   CALCIUM mg/dL 7.9* 7.6* 8.0* 8.4*  --  8.3*   ALBUMIN g/dL 2.5*  --  2.4* 2.7*  --  3.0*   WBC 10*3/mm3 16.03* 21.90* 23.95* 30.99*   < > 15.36*   HEMOGLOBIN g/dL 7.8* 8.0* 8.4* 10.3*   < > 9.2*   PLATELETS 10*3/mm3 233 235 227 294   < > 290    < > = values in this interval not displayed.           Assessment & Plan       Hx of hypotension    Mixed hyperlipidemia    Centrilobular emphysema    Tobacco abuse    Malignant neoplasm of lower lobe of right lung    GERD without esophagitis    ARACELI (acute kidney injury)    CAD (coronary artery disease)    Stage 4 chronic kidney disease    Hypotension    Dehydration    C. difficile colitis    UTI (urinary tract infection)    1.  ARACELI on CKD stage IV -7.9 creatinine peaked around 7.6 to last admission, patient went home with a " creatinine slowly coming down to 5 range.  Serologic workup -ANCA (-), Unclear etiology - DDx- AIN - vancomycin associated nephrotoxicity vs pre-renal/atn from hypotension.   2.  Proteinuria   3.  Hypotension - Cortisol level - 15.44 and ACTH pending. level 2 weeks back was 26.33 - Adrenal insufficiency reported with Opdivo in literature along with other endocrine abnormalities. Per wife, he has salt craving and weakness even before Opdivo. ACTH stimulation test result normal, DHEA- normal, renin viola level pending  4.  Hyponatremia    5. Anemia   6.  Metabolic acidosis.   7.  Infectious disease: Patient's followed with ID consultants.  8.  S/p immunotherapy for non-small cell cancer last infusion 4/4/2024 Opdivo.  9- 10- C diff colitis - on vancomycin.      Recommendation:  Renal function slightly Worse from yesterday although the blood pressure improved patient is feeling better denies any other symptoms no dialysis today.  - Repeat UA and urine lytes urine sodium 89, urine creatinine 25, urine eos 0, urine protein 46.0, vancomycin level less than 4, uric acid 7.2, CK10 .  - renal ultrasound kidneys are normal in size no hydronephrosis normal blood flow to both kidneys no masses seen.                                          Bladder scan ordered  - Continue with Florinef 50 mg daily - BP better   - Continue with midodrine as needed   - Sodium bicarb tablets  - Monitor I/O   - Avoid nephrotoxic agents.   - Renal diet   - Adjust meds per renal function    - Monitor H/H and transfuse for Hgb less than 7.0      High risk complex patient multiple medical problems.    Alok Dixon MD  05/28/24  15:07 EDT

## 2024-05-28 NOTE — THERAPY TREATMENT NOTE
Patient Name: David Barfield  : 1950    MRN: [id number]                              Today's Date: 2024       Admit Date: 2024    Visit Dx:     ICD-10-CM ICD-9-CM   1. C. difficile colitis  A04.72 008.45   2. Leukocytosis, unspecified type  D72.829 288.60   3. Hypotension due to hypovolemia  E86.1 458.8     276.52     Patient Active Problem List   Diagnosis    High degree atrioventricular block    Bradycardia, sinus    Chronic hypotension    PVC's (premature ventricular contractions)    Presence of cardiac pacemaker    Mixed hyperlipidemia    Centrilobular emphysema    Nodule of lower lobe of right lung    Mediastinal adenopathy    Cough    Tobacco abuse    Bronchogenic cancer of right lung    Malignant neoplasm of lower lobe of right lung    Primary hypertension    GERD without esophagitis    Septic shock    Acute pyelonephritis    ARACELI (acute kidney injury)    Hypokalemia    Severe malnutrition    Leukocytosis    CAD (coronary artery disease)    Hypotension    Stage 4 chronic kidney disease    Moderate malnutrition    Weakness    Hypotension    Dehydration    C. difficile colitis    UTI (urinary tract infection)    Hx of hypotension     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    Coronary artery disease 2019    Diabetes mellitus Borderline    Emphysema/COPD     Erectile disorder     GERD (gastroesophageal reflux disease)     History of chemotherapy     Hyperlipidemia     Hypertension 2019    Lung nodule     Mumps     Mumps     Pruritus     after bath    Slow to wake up after anesthesia     Stage 5 chronic kidney disease not on chronic dialysis 2024    Wears dentures     upper only    Wears hearing aid in both ears     usually only wears right     Past Surgical History:   Procedure Laterality Date    BONE BIOPSY      broken bone surgery in his face    BRONCHOSCOPY THORACOTOMY  Right 01/09/2024    Procedure: THORACOTOMY FOR LOWER LOBECTOMY AND MEDISTINAL LYMPH NODE DISSECTION RIGHT;  Surgeon: Joey Patel MD;  Location: [facility] OR;  Service: Cardiothoracic;  Laterality: Right;    BRONCHOSCOPY WITH ION ROBOTIC ASSIST N/A 09/15/2023    Procedure: BRONCHOSCOPY NAVIGATION WITH ENDOBRONCHIAL ULTRASOUND AND ION ROBOT;  Surgeon: Octaviano Sampson MD;  Location: [facility] ENDOSCOPY;  Service: Robotics - Pulmonary;  Laterality: N/A;  ion #6 - 0032  - 0015  Cath guide 0061    EBUS balloon removed and intact    CARDIAC ELECTROPHYSIOLOGY PROCEDURE N/A 08/17/2021    Procedure: Pacemaker DC new;  Surgeon: Kayy Box MD;  Location: [facility] CATH INVASIVE LOCATION;  Service: Cardiology;  Laterality: N/A;    FACIAL FRACTURE SURGERY      LYMPH NODE BIOPSY  2023    PACEMAKER IMPLANTATION        General Information       Row Name 05/28/24 1549          Physical Therapy Time and Intention    Document Type therapy note (daily note)  -AB     Mode of Treatment physical therapy  -AB       Row Name 05/28/24 1549          General Information    Patient Profile Reviewed yes  -AB     Existing Precautions/Restrictions fall;orthostatic hypotension;other (see comments)  monitor BP, contact precautions  -AB     Barriers to Rehab medically complex  -AB       Row Name 05/28/24 1549          Cognition    Orientation Status (Cognition) oriented x 4  -AB       Row Name 05/28/24 1549          Safety Issues, Functional Mobility    Safety Issues Affecting Function (Mobility) awareness of need for assistance;insight into deficits/self-awareness;safety precautions follow-through/compliance;sequencing abilities  -AB     Impairments Affecting Function (Mobility) endurance/activity tolerance;postural/trunk control;shortness of breath;strength  -AB               User Key  (r) = Recorded By, (t) = Taken By, (c) = Cosigned By      Initials Name Provider Type    AB Marie Becker, PT Physical Therapist                    Mobility       Row Name 05/28/24 1551          Bed Mobility    Bed Mobility supine-sit;sit-supine  -AB     Scooting/Bridging [place] (Bed Mobility) independent  -AB     Supine-Sit [place] (Bed Mobility) standby assist  -AB     Sit-Supine [place] (Bed Mobility) standby assist  -AB     Assistive Device (Bed Mobility) head of bed elevated  -AB     Comment, (Bed Mobility) No issues noted. Orthostatics taken with noted BP drop from 171/87 to 151/79 sitting EOB although pt asymptomatic.  -AB       Row Name 05/28/24 1551          Transfers    Comment, (Transfers) Further BP drop noted from 151/79 to 116/77 in standing, pt denied dizziness. Cues for hand placement and sequencing. No AD used.  -AB       Row Name 05/28/24 1551          Sit-Stand Transfer    Sit-Stand [place] (Transfers) contact guard;1 person assist;verbal cues  -AB       Row Name 05/28/24 1551          Gait/Stairs (Locomotion)    [place] Level (Gait) contact guard;1 person assist;verbal cues  -AB     Patient was able to Ambulate yes  -AB     Distance in Feet (Gait) 100  -AB     Deviations/Abnormal Patterns (Gait) bilateral deviations;melissa decreased;gait speed decreased;stride length decreased  -AB     Bilateral Gait Deviations forward flexed posture  -AB     Comment, (Gait/Stairs) Pt ambulated with step through gait pattern, mild forward flexed posture, and slowed melissa. Denied dizziness throughout. Cues for upright posture and forward gaze. No overt LOB or knee buckling. Further activity limited by fatigue. Post gait BP of 155/81.  -AB               User Key  (r) = Recorded By, (t) = Taken By, (c) = Cosigned By      Initials Name Provider Type    AB Marie Becker, PT Physical Therapist                   Obj/Interventions       Row Name 05/28/24 1557          Balance    Balance Assessment sitting static balance;sitting dynamic balance;standing static balance;standing dynamic balance  -AB     Static Sitting Balance independent   -AB     Dynamic Sitting Balance standby assist  -AB     Position, Sitting Balance unsupported;sitting edge of bed  -AB     Static Standing Balance contact guard  -AB     Dynamic Standing Balance contact guard;1-person assist;verbal cues  -AB     Position/Device Used, Standing Balance unsupported  -AB     Balance Interventions sitting;standing;sit to stand;static;dynamic;occupation based/functional task  -AB     Comment, Balance No overt LOB.  -AB               User Key  (r) = Recorded By, (t) = Taken By, (c) = Cosigned By      Initials Name Provider Type    AB Marie Becker, PT Physical Therapist                   Goals/Plan    No documentation.                  Clinical Impression       Row Name 05/28/24 1557          Pain    Pretreatment Pain Rating 0/10 - no pain  -AB     Posttreatment Pain Rating 0/10 - no pain  -AB       Row Name 05/28/24 1553          Plan of Care Review    Plan of Care Reviewed With patient;significant other  -AB     Progress improving  -AB     Outcome Evaluation Pt with good effort and increased ambulation distance to 100' with CGA and no AD. Orthostatics taken with significant drop noted, however pt asymptomatic throughout. Pt continues to present below his functional baseline with weakness, impaired endurance, and generalized deconditioning. Further IPPT is warrented. PT will progress as able per POC.  -AB       Row Name 05/28/24 1554          Vital Signs    Pre Systolic BP Rehab 171  supine  -AB     Pre Treatment Diastolic BP 87  -AB     Intra Systolic BP Rehab 151   sitting  -AB     Intra Treatment Diastolic BP 79  -AB     Post Systolic BP Rehab 116   standing. Post gait /81  -AB     Post Treatment Diastolic BP 77  -AB     Pretreatment Heart Rate (beats/min) 89  -AB     Posttreatment Heart Rate (beats/min) 82  -AB     Pre SpO2 (%) 98  -AB     O2 Delivery Pre Treatment room air  -AB     O2 Delivery Intra Treatment room air  -AB     Post SpO2 (%) 100  -AB     O2 Delivery Post  Treatment room air  -AB     Pre Patient Position Supine  -AB     Intra Patient Position Standing  -AB     Post Patient Position Supine  -AB       Row Name 05/28/24 1557          Positioning and Restraints    Pre-Treatment Position in bed  -AB     Post Treatment Position bed  -AB     In Bed notified nsg;supine;call light within reach;encouraged to call for assist;with family/caregiver  RN approved bed alarm to be left off.  -AB               User Key  (r) = Recorded By, (t) = Taken By, (c) = Cosigned By      Initials Name Provider Type    Marie Apple, PT Physical Therapist                   Outcome Measures       Row Name 05/28/24 1600 05/28/24 0800       How much help from another person do you currently need...    Turning from your back to your side while in flat bed without using bedrails? 4  -AB 4  -AS    Moving from lying on back to sitting on the side of a flat bed without bedrails? 3  -AB 4  -AS    Moving to and from a bed to a chair (including a wheelchair)? 3  -AB 4  -AS    Standing up from a chair using your arms (e.g., wheelchair, bedside chair)? 3  -AB 4  -AS    Climbing 3-5 steps with a railing? 2  -AB 3  -AS    To walk in hospital room? 3  -AB 4  -AS    AM-PAC 6 Clicks Score (PT) 18  -AB 23  -AS    Highest Level of Mobility Goal 6 --> Walk 10 steps or more  -AB 7 --> Walk 25 feet or more  -AS      Row Name 05/28/24 1600          Functional Assessment    Outcome Measure Options AM-PAC 6 Clicks Basic Mobility (PT)  -AB               User Key  (r) = Recorded By, (t) = Taken By, (c) = Cosigned By      Initials Name Provider Type    Marie Apple, PT Physical Therapist    AS Anastasiya Billingsley, RN Registered Nurse                                 Physical Therapy Education       Title: PT OT SLP Therapies (In Progress)       Topic: Physical Therapy (In Progress)       Point: Mobility training (In Progress)       Learning Progress Summary             Patient Acceptance, E,D, VU,NR by AB at 5/28/2024  1601    Acceptance, E,D,H, NR by DM at 5/26/2024 1621   Family Acceptance, E,D,H, NR by DM at 5/26/2024 1621   Significant Other Acceptance, E,D,H, NR by DM at 5/26/2024 1621                         Point: Home exercise program (In Progress)       Learning Progress Summary             Patient Acceptance, E,D,H, NR by DM at 5/26/2024 1621   Family Acceptance, E,D,H, NR by DM at 5/26/2024 1621   Significant Other Acceptance, E,D,H, NR by DM at 5/26/2024 1621                         Point: Body mechanics (In Progress)       Learning Progress Summary             Patient Acceptance, E,D, VU,NR by AB at 5/28/2024 1601    Acceptance, E,D,H, NR by DM at 5/26/2024 1621   Family Acceptance, E,D,H, NR by DM at 5/26/2024 1621   Significant Other Acceptance, E,D,H, NR by DM at 5/26/2024 1621                         Point: Precautions (In Progress)       Learning Progress Summary             Patient Acceptance, E,D, VU,NR by AB at 5/28/2024 1601    Acceptance, E,D,H, NR by DM at 5/26/2024 1621   Family Acceptance, E,D,H, NR by DM at 5/26/2024 1621   Significant Other Acceptance, E,D,H, NR by DM at 5/26/2024 1621                                         User Key       Initials Effective Dates Name Provider Type Discipline    DM 02/03/23 -  Michelle Claros, PT Physical Therapist PT    AB 09/22/22 -  Marie Becker, PT Physical Therapist PT                  PT Recommendation and Plan     Plan of Care Reviewed With: patient, significant other  Progress: improving  Outcome Evaluation: Pt with good effort and increased ambulation distance to 100' with CGA and no AD. Orthostatics taken with significant drop noted, however pt asymptomatic throughout. Pt continues to present below his functional baseline with weakness, impaired endurance, and generalized deconditioning. Further IPPT is warrented. PT will progress as able per POC.     Time Calculation:         PT Charges       Row Name 05/28/24 1601             Time Calculation    Start  Time 1505  -AB      PT Received On 05/28/24  -AB         Timed Charges    93161 - Gait Training Minutes  15  -AB      48961 - PT Therapeutic Activity Minutes 8  -AB         Total Minutes    Timed Charges Total Minutes 23  -AB       Total Minutes 23  -AB                User Key  (r) = Recorded By, (t) = Taken By, (c) = Cosigned By      Initials Name Provider Type    AB Marie Becker, PT Physical Therapist                  Therapy Charges for Today       Code Description Service Date Service Provider Modifiers Qty    60296711637 HC GAIT TRAINING EA 15 MIN 5/28/2024 Marie Becker, PT GP 1    [id number]  PT THERAPEUTIC ACT EA 15 MIN 5/28/2024 Marie Becker, PT GP 1            PT G-Codes  Outcome Measure Options: AM-PAC 6 Clicks Basic Mobility (PT)  AM-PAC 6 Clicks Score (PT): 18  AM-PAC 6 Clicks Score (OT): 21  Modified Catarina Scale: 2 - Slight disability.  Unable to carry out all previous activities but able to look after own affairs without assistance.  PT Discharge Summary  Anticipated Discharge Disposition (PT): home with assist, home with outpatient therapy services    Marie Becker PT  5/28/2024

## 2024-05-28 NOTE — PROGRESS NOTES
"          Clinical Nutrition Assessment     Patient Name: David Barfield  YOB: 1950  MRN: [id number]  Date of Encounter: 05/28/24 15:51 EDT  Admission date: 5/25/2024  Reason for Visit: MST score 2+, \"Unsure\" unintentional weight loss    Assessment   Nutrition Assessment   Admission Diagnosis:  Hx of hypotension [Z86.79]  ARACELI (acute kidney injury) [N17.9]    Problem List:    Hx of hypotension    Mixed hyperlipidemia    Centrilobular emphysema    Tobacco abuse    Malignant neoplasm of lower lobe of right lung    GERD without esophagitis    ARACELI (acute kidney injury)    CAD (coronary artery disease)    Stage 4 chronic kidney disease    Hypotension    Dehydration    C. difficile colitis    UTI (urinary tract infection)      PMH:   He  has a past medical history of Abnormal ECG, Arrhythmia (2019), Asthma (2019), Bronchogenic cancer of right lung (10/04/2023), Coronary artery disease (2019), Diabetes mellitus (Borderline), Emphysema/COPD, Erectile disorder, GERD (gastroesophageal reflux disease), History of chemotherapy, Hyperlipidemia, Hypertension (2019), Lung nodule, Mumps, Mumps, Pruritus, Slow to wake up after anesthesia, Stage 5 chronic kidney disease not on chronic dialysis (5/14/2024), Wears dentures, and Wears hearing aid in both ears.    PSH:  He  has a past surgical history that includes Bone biopsy; Facial fracture surgery; Cardiac electrophysiology procedure (N/A, 08/17/2021); BRONCHOSCOPY WITH ION ROBOTIC ASSIST (N/A, 09/15/2023); Pacemaker Implantation; bronchoscopy thoracotomy (Right, 01/09/2024); and Lymph node biopsy (2023).    Applicable Nutrition History:   4/12/24: Chronic severe malnutrition  4/25/24: Chronic severe malnutrition  5/15/24: Chronic moderate malnutrition    Anthropometrics     Height: Height: 182.9 cm (72\")  Last Filed Weight: Weight: 84 kg (185 lb 3.2 oz) (05/28/24 0500)  Method: Weight Method: Bed scale  BMI: BMI (Calculated): 25.1    UBW:  Last admission: 5/15/24 " 172#  Weight change: unchanged    Nutrition Focused Physical Exam    Date:  05/28/24        Last NFPE performed on 5/15/24      Subjective   Reported/Observed/Food/Nutrition Related History:     05/28/24   Patient very well known to nutrition services through multiple admissions. Doesn't like any ONS options. Weight stable since previous admit. Noted he has been rx megace since previous admit.    Current Nutrition Prescription   PO: Diet: Cardiac; Healthy Heart (2-3 Na+); Fluid Consistency: Thin (IDDSI 0)  Oral Nutrition Supplement: n/a  Intake:  % since admit    Assessment & Plan   Nutrition Diagnosis   Date:  05/28/24             Updated:    Problem Malnutrition  moderate   Etiology Chronic illness   Signs/Symptoms moderate muscle/fat loss   Status: New    Goal:   Nutrition to support treatment and Maintain intake      Nutrition Intervention      Follow treatment progress, Care plan reviewed, Encourage intake    Patient meets malnutrition criteria, see MSA for full assessment.  Encourage adequate PO intake    Monitoring/Evaluation:   Per protocol, PO intake, Weight, Symptoms, POC/GOC    Siena Colón, MS,RD,LD  Time Spent: 25min

## 2024-05-28 NOTE — CASE MANAGEMENT/SOCIAL WORK
Discharge Planning Assessment  [facility]     Patient Name: David Barfield  MRN: [id number]  Today's Date: 5/28/2024    Admit Date: 5/25/2024    Plan: Home   Discharge Needs Assessment       Row Name 05/28/24 1526       Living Environment    People in Home significant other    Current Living Arrangements home    Primary Care Provided by self    Provides Primary Care For no one    Family Caregiver if Needed significant other    Quality of Family Relationships supportive    Able to Return to Prior Arrangements yes       Resource/Environmental Concerns    Transportation Concerns none       Transition Planning    Patient/Family Anticipates Transition to home with family    Patient/Family Anticipated Services at Transition none    Transportation Anticipated family or friend will provide       Discharge Needs Assessment    Readmission Within the Last 30 Days previous discharge plan unsuccessful    Equipment Currently Used at Home shower chair;walker, rolling;cane, quad tip                   Discharge Plan       Row Name 05/28/24 1529       Plan    Plan Home    Patient/Family in Agreement with Plan yes    Plan Comments Spoke with Mr. Barfield and Significant other at the bedside. He lives with his Significant other in [place] [place] [facility]. He is independent with ADL's. He has a walker, quad cane and shower chair. He does not use oxygen or home health. He has advance directives. His PCP is Hayley Castellanos and he has Medicare and [employer] insurance. Therapy is recommending home with OP PT and OT. Discussed with family and they declined. Discharge plan is home. CM will continue to follow for discharge needs.    Final Discharge Disposition Code 01 - home or self-care                  Continued Care and Services - Admitted Since 5/25/2024    No active coordination exists for this encounter.       Selected Continued Care - Episodes Includes continued care and service providers with selected services from the active  episodes listed below      Chronic Care Management Episode start date: 5/28/2024   There are no active outsourced providers for this episode.                 Expected Discharge Date and Time       Expected Discharge Date Expected Discharge Time    May 30, 2024            Demographic Summary       Row Name 05/28/24 1526       General Information    Admission Type inpatient    Arrived From home    Referral Source admission list    Reason for Consult discharge planning    Preferred Language English       Contact Information    Permission Granted to Share Info With                    Functional Status       Row Name 05/28/24 1526       Functional Status, IADL    Medications independent    Meal Preparation independent    Housekeeping independent    Laundry independent    Shopping independent       Mental Status    General Appearance WDL WDL                   Psychosocial    No documentation.                  Abuse/Neglect    No documentation.                  Legal    No documentation.                  Substance Abuse    No documentation.                  Patient Forms    No documentation.                     Chapis Felder RN

## 2024-05-28 NOTE — PROGRESS NOTES
INFECTIOUS DISEASE f/u     David Barfield  1950  [id number]    Date of Consult: 5/25/2024    Admission Date: 5/25/2024      Requesting Provider: ANAMIKA Sr  Evaluating Physician: David Mistry MD    Reason for Consultation: Cdiff    History of present illness:    Patient is a 74 y.o. male , known to Dr. Derian Barry, with h/o COPD, NSCLC right lung/chemo/RLL lobectomy 1/2024/on Opdivo with last dose around 4/4/2024, T2DM, CAD, AV block/ppm, HTN, HLD, tobacco use, and CKD Stage V/not on dialysis who presented to [facility] ED on 5/25 with hypotension.  He has had multiple admissions over the last month. He had severe leukocytosis and hospitalized from 4/24 to 4/29.  He was started on Cefepime.  A Karius test from 4/26 was negative. He also had persistent diarrhea; however, his Cdiff and GI panel PCR tests were negative from 4/25.  He was admitted from 5/6-5/12 for bilateral pyelonephritis and continued on IV Cefepime with end date tentative for 6/3.  He was admitted to [facility] from 5/20-5/23 for acute renal failure and found to have Cdiff positive PCR and Antigen test.  Cefepime was stopped and he was discharged on oral Vancomycin for 2 weeks.      He was readmitted to [facility] on 5/25 for increased weakness along with nausea, vomiting, and decreased appetite.  He had hypotension at home that did not improve with midodrine.  He was brought to ED by EMS.  He denies fever or chills or worsening diarrhea.  He states that he has had no diarrhea since a couple of days ago.  He has had some dysuria and increased urinary frequency.  He is afebrile.  Pertinent labs were lactic acid 2.3, WBC 31,000 with 83% segs/9% bands, CPK 9, and creatinine 3.27 (baseline 2.03 to 2.4 and last creatinine on 3/22 was 3.86 after acute renal failure episode).  Blood cultures are pending.  A UA WBC was 21-50 with moderate LE and negative nitrite.  No imaging studies have been done on this visit.  He is currently on oral Vancomycin.   ID was asked to evaluate and manage his antibiotic therapy.     5/25/24; Covering for Dr. TAMARA Barry; diarrhea better; having semiformed bowel movements; afebrile, awake, alert, following commands.  Tolerating oral vancomycin    5/28/24: Patient is feeling somewhat better.  Diarrhea has resolved.  He has not had a bowel movement today.  He is not having any fevers.  Leukocytosis is improving.  Creatinine is worse but he states that he is having significant urine output.  No nausea or vomiting.    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    Coronary artery disease 2019    Diabetes mellitus Borderline    Emphysema/COPD     Erectile disorder     GERD (gastroesophageal reflux disease)     History of chemotherapy     Hyperlipidemia     Hypertension 2019    Lung nodule     Mumps     Mumps     Pruritus     after bath    Slow to wake up after anesthesia     Stage 5 chronic kidney disease not on chronic dialysis 5/14/2024    Wears dentures     upper only    Wears hearing aid in both ears     usually only wears right       Past Surgical History:   Procedure Laterality Date    BONE BIOPSY      broken bone surgery in his face    BRONCHOSCOPY THORACOTOMY Right 01/09/2024    Procedure: THORACOTOMY FOR LOWER LOBECTOMY AND MEDISTINAL LYMPH NODE DISSECTION RIGHT;  Surgeon: Joey Patel MD;  Location: [facility] OR;  Service: Cardiothoracic;  Laterality: Right;    BRONCHOSCOPY WITH ION ROBOTIC ASSIST N/A 09/15/2023    Procedure: BRONCHOSCOPY NAVIGATION WITH ENDOBRONCHIAL ULTRASOUND AND ION ROBOT;  Surgeon: Octaviano Sampson MD;  Location: [facility] ENDOSCOPY;  Service: Robotics - Pulmonary;  Laterality: N/A;  ion #6 - 0032  - 0015  Cath guide 0061    EBUS balloon removed and intact    CARDIAC ELECTROPHYSIOLOGY PROCEDURE N/A 08/17/2021    Procedure: Pacemaker DC new;  Surgeon: Kayy Box MD;  Location:  " [facility];  Service: Cardiology;  Laterality: N/A;    FACIAL FRACTURE SURGERY      LYMPH NODE BIOPSY  2023    PACEMAKER IMPLANTATION         Family History   Problem Relation Age of Onset    Aneurysm Mother         brain    Dementia Father     Leukemia Sister     Heart disease Paternal Grandmother     Hypertension Paternal Grandfather     Cancer Sister        Social History     Socioeconomic History    Marital status: Single    Number of children: 3   Tobacco Use    Smoking status: Every Day     Current packs/day: 0.50     Average packs/day: 0.5 packs/day for 56.4 years (28.7 ttl pk-yrs)     Types: Cigarettes     Start date: 1/1/1968    Smokeless tobacco: Never    Tobacco comments:     Still smoke   Vaping Use    Vaping status: Never Used   Substance and Sexual Activity    Alcohol use: Never    Drug use: Never    Sexual activity: Yes     Partners: Female     Birth control/protection: None       Allergies   Allergen Reactions    Cymbalta [Duloxetine Hcl] GI Intolerance    Gabapentin Mental Status Change     Pt states that this medication \"makes him feel foolish in his head\".     Remeron [Mirtazapine] Other (See Comments)     Excess sedation    Toradol [Ketorolac Tromethamine] GI Intolerance     Projectile vomiting     Latex Other (See Comments)     Latex allergy     Tape Rash         Medication:    Current Facility-Administered Medications:     acetaminophen (TYLENOL) tablet 650 mg, 650 mg, Oral, Q4H PRN **OR** acetaminophen (TYLENOL) 160 MG/5ML oral solution 650 mg, 650 mg, Oral, Q4H PRN **OR** acetaminophen (TYLENOL) suppository 650 mg, 650 mg, Rectal, Q4H PRN, Bryson, Marisabel, APRN    albuterol sulfate HFA (PROVENTIL HFA;VENTOLIN HFA;PROAIR HFA) inhaler 2 puff, 2 puff, Inhalation, Q4H PRN, Bryson, Marisabel, APRN    budesonide-formoterol (SYMBICORT) 160-4.5 MCG/ACT inhaler 2 puff, 2 puff, Inhalation, BID - RT, 2 puff at 05/28/24 0943 **AND** tiotropium (SPIRIVA RESPIMAT) 2.5 mcg/act aerosol " solution inhaler, 2 puff, Inhalation, Daily - RT, Bryson, Marisabel, APRN, 2 puff at 24 0944    ferrous sulfate tablet 325 mg, 325 mg, Oral, Daily With Breakfast, Bryson, Marisabel, APRN, 325 mg at 24 0834    fludrocortisone tablet 50 mcg, 50 mcg, Oral, Daily, Kurt Ríos MD, 50 mcg at 24 0841    megestrol (MEGACE) tablet 20 mg, 20 mg, Oral, Daily, Buddy Lawrence MD, 20 mg at 24 0841    melatonin tablet 5 mg, 5 mg, Oral, Nightly PRN, Dana Silverman PA-C, 5 mg at 24 0128    midodrine (PROAMATINE) tablet 10 mg, 10 mg, Oral, TID PRN, Bryson, Marisabel, APRN, 10 mg at 24 1145    nitroglycerin (NITROSTAT) SL tablet 0.4 mg, 0.4 mg, Sublingual, Q5 Min PRN, Brysno, Marisabel, APRN    ondansetron (ZOFRAN) injection 4 mg, 4 mg, Intravenous, Q6H PRN, Dana Silverman PA-C, 4 mg at 24 0044    pravastatin (PRAVACHOL) tablet 80 mg, 80 mg, Oral, Nightly, Bryson, Marisabel, APRN, 80 mg at 24 2211    sodium bicarbonate tablet 1,300 mg, 1,300 mg, Oral, TID, Bryson, Marisabel, APRN, 1,300 mg at 24 0834    sodium chloride 0.9 % infusion, 100 mL/hr, Intravenous, Continuous, Kurt Ríos MD, Last Rate: 100 mL/hr at 24 1635, 100 mL/hr at 24 1635    vancomycin (VANCOCIN) capsule 125 mg, 125 mg, Oral, Q6H, Bryson, Marisabel, APRN, 125 mg at 24 1300    Antibiotics:  Anti-Infectives (From admission, onward)      Ordered     Dose/Rate Route Frequency Start Stop    24 0908  vancomycin (VANCOCIN) capsule 125 mg        Ordering Provider: Marisabel Massey APRN    125 mg Oral Every 6 Hours Scheduled 24 1200 24 1159              Review of Systems:  See hpi      Physical Exam:   Vital Signs  Temp (24hrs), Av.6 °F (37 °C), Min:98.4 °F (36.9 °C), Max:98.7 °F (37.1 °C)    Temp  Min: 98.4 °F (36.9 °C)  Max: 98.7 °F (37.1 °C)  BP  Min: 143/84  Max: 180/96  Pulse  Min: 70  Max: 84  Resp  Min: 16  Max: 18  SpO2  Min: 94 %  Max: 100  %    GENERAL: Awake and alert, in no acute distress. Sitting up in bed  HEENT: Normocephalic, atraumatic. No external oral lesions noted  HEART: Regular rate and rhythm per the heart monitor  LUNGS: Nonlabored breathing on room air.  Not coughing.  ABDOMEN: Soft, nontender, nondistended. No HSM palpated  EXT:  No peripheral edema.  No cellulitic change noted  :  Without Yeung catheter.  MSK: No joint effusions or erythema  SKIN: No generalized rashes or skin lesions noted  NEURO: Oriented to PPT.  Normal speech and cognition  PSYCHIATRIC: Normal insight and judgment. Cooperative with PE    Laboratory Data    Results from last 7 days   Lab Units 05/28/24  0839 05/27/24  0628 05/26/24  0509   WBC 10*3/mm3 16.03* 21.90* 23.95*   HEMOGLOBIN g/dL 7.8* 8.0* 8.4*   HEMATOCRIT % 23.8* 24.8* 25.5*   PLATELETS 10*3/mm3 233 235 227     Results from last 7 days   Lab Units 05/28/24  0839   SODIUM mmol/L 130*   POTASSIUM mmol/L 4.0   CHLORIDE mmol/L 96*   CO2 mmol/L 19.0*   BUN mg/dL 51*   CREATININE mg/dL 7.90*   GLUCOSE mg/dL 123*   CALCIUM mg/dL 7.9*     Results from last 7 days   Lab Units 05/25/24  0343   ALK PHOS U/L 108   BILIRUBIN mg/dL 0.3   ALT (SGPT) U/L 22   AST (SGOT) U/L 25             Results from last 7 days   Lab Units 05/25/24  0718   LACTATE mmol/L 2.1*     Results from last 7 days   Lab Units 05/27/24  0857 05/25/24  0343   CK TOTAL U/L 10* 9*     Results from last 7 days   Lab Units 05/27/24  0857   VANCOMYCIN TR mcg/mL <4.00*     Estimated Creatinine Clearance: 9.6 mL/min (A) (by C-G formula based on SCr of 7.9 mg/dL (H)).      Microbiology:  Blood cultures pending.   Urine culture pending            Radiology:  Imaging Results (Last 72 Hours)       Procedure Component Value Units Date/Time    US Renal Bilateral [[id number]] Collected: 05/28/24 1236     Updated: 05/28/24 1241    Narrative:      US RENAL BILATERAL    Date of Exam: 5/28/2024 11:12 AM EDT    Indication: ARACELI.    Comparison: No comparisons  available.    Technique: Grayscale and color Doppler ultrasound evaluation of the kidneys and urinary bladder was performed.      Findings:  Kidneys are normal in size and shape bilaterally. Note is made of a right pleural effusion. There is no right hydronephrosis. There is caliectasis of the left upper pole kidney. There is normal blood flow to both kidneys. There are no renal masses.   Ureteral jets appear normal. There is moderate prostate enlargement. There are prostate calcifications.      Impression:      Impression:  Caliectasis of the left upper pole kidney. Right pleural effusion. Enlarged prostate gland with calcifications.        Electronically Signed: Elsa Schmid MD    5/28/2024 12:38 PM EDT    Workstation ID: [id number]    CT Abdomen Pelvis Without Contrast [[id number]] Collected: 05/25/24 1822     Updated: 05/25/24 1830    Narrative:      CT ABDOMEN PELVIS WO CONTRAST    Date of Exam: 5/25/2024 3:09 PM EDT    Indication: recent c diff, n/v. elevated wbc.    Comparison: 5/19/2024    Technique: Axial CT images were obtained of the abdomen and pelvis without the administration of contrast. Reconstructed coronal and sagittal images were also obtained. Automated exposure control and iterative construction methods were used.      Findings:  Limited views of the lung bases demonstrates mild groundglass opacity in the left lung base as well as residual right base effusion.    The liver is unremarkable. The gallbladder is normal. The pancreas is normal. Spleen is normal.    Bilateral adrenal glands are normal. Bilateral kidneys are normal. The bladder is unremarkable.    Sigmoid colon and rectum are unremarkable. The descending colon is unremarkable. There is wall thickening and inflammatory changes surrounding the a sending colon. The small bowel is unremarkable. No evidence of abscess is identified. The appendix is   normal. The small bowel is unremarkable. The stomach is unremarkable.    Prominent vascular  calcifications. No adenopathy is identified. There is stranding within the pararenal fat as well as in the mesentery and along the paracolic gutters. This may be secondary to anasarca. No aggressive appearing lytic or sclerotic bone   lesions.      Impression:      Impression:    1. Persistent right effusion.  2. Increased inflammatory changes involving the predominantly ascending colon. Findings are most consistent with colitis.  2. Increased edema within the fat likely reflects anasarca.            Electronically Signed: Micha Millan MD    5/25/2024 6:27 PM EDT    Workstation ID: [id number]              Impression:   - C. Difficile colitis-diarrhea recently but improved today  - Recent bilateral pyelonephritis/treated with Cefepime  - Marked leukocytosis/neutrophilia-Improving  - Lactic acidosis  - Hypotension/responding to fluid resuscitation-ACTH stimulation test result was normal.  DHEA was normal.  Renin Cristhian level pending.  - Acute on chronic renal failure Stage IV-Not currently on dialysis. Intermittent hypotension likely contributing.  Unclear if another etiology could be contributing as well.  Extensive workup thus far has been negative.  No urine eosinophils.  - Non small cell lung cancer s/p RLL lobectomy/s/p chemotherapy  - Chronic obstructive pulmonary disease with ongoing tobacco abuse  - Type 2 diabetes mellitus  - AV block/ppm  - Essential hypertension      PLAN/RECOMMENDATIONS:   Thank you for asking us to see David Barfield, I recommend the following:    -Continue to monitor CBC and BMP closely  -Urine culture from 5/25 was again no growth  -Blood cultures remain no growth to date  -Continue vancomycin 125 mg by mouth every 6 hours. Doubt vancomycin associated nephrotoxicity as by mouth vancomycin does not get high levels in the bloodstream and his vancomycin level was not high when checked this admission. No urine eosinophils. Additionally, he has had renal dysfunction during every admission  and was only started on oral vancomycin during his last admission when his C. Difficile test resulted positive.  -Nephrology is following.  I will defer whether or not he needs renal biopsy to them.    I discussed in length with the patient and his girlfriend at bedside today      Derian Barry MD  5/28/2024  16:45 EDT

## 2024-05-28 NOTE — PROGRESS NOTES
Malnutrition Severity Assessment    Patient Name:  David Barfield  YOB: 1950  MRN: [id number]  Admit Date:  5/25/2024    Patient meets criteria for : Moderate (non-severe) Malnutrition    Comments:  Patient continues to meet chronic illness related moderate malnutrition criteria with indicators for moderate muscle/fat loss.     Previously met chronic illness related moderate malnutrition on 5/15/24 and severe malnutrition criteria on 4/25/24 and 4/12/24.    Malnutrition Severity Assessment  Malnutrition Type: Chronic Disease - Related Malnutrition  Malnutrition Type (Last 8 Hours)       Malnutrition Severity Assessment       Row Name 05/28/24 1549       Malnutrition Severity Assessment    Malnutrition Type Chronic Disease - Related Malnutrition      Row Name 05/28/24 1549       Muscle Loss    Loss of Muscle Mass Findings Moderate    [place] Moderate - slight depression    Clavicle Bone Region Moderate - some protrusion in females, visible in males    Acromion Bone Region Moderate - acromion may slightly protrude    Dorsal Hand Region Moderate - slight depression    Patellar Region Moderate - patella more prominent, less muscle definition around patella    Anterior Thigh Region Moderate - mild depression on inner thigh    Posterior Calf Region Moderate - some roundness, slight firmness      Row Name 05/28/24 1549       Fat Loss    Subcutaneous Fat Loss Findings Moderate    Orbital Region  Moderate -  somewhat hollowness, slightly dark circles    Upper Arm Region Moderate - some fat tissue, not ample      Row Name 05/28/24 1549       Criteria Met (Must meet criteria for severity in at least 2 of these categories: M Wasting, Fat Loss, Fluid, Secondary Signs, Wt. Status, Intake)    Patient meets criteria for  Moderate (non-severe) Malnutrition                    Electronically signed by:  Siena Colón, MS,RD,LD  05/28/24 15:50 EDT

## 2024-05-29 ENCOUNTER — APPOINTMENT (OUTPATIENT)
Dept: GENERAL RADIOLOGY | Facility: HOSPITAL | Age: 74
DRG: 372 | End: 2024-05-29
Payer: MEDICARE

## 2024-05-29 LAB
ALBUMIN SERPL-MCNC: 2.5 G/DL (ref 3.5–5.2)
ALDOST SERPL-MCNC: 2.5 NG/DL (ref 0–30)
ANION GAP SERPL CALCULATED.3IONS-SCNC: 17 MMOL/L (ref 5–15)
BUN SERPL-MCNC: 53 MG/DL (ref 8–23)
BUN/CREAT SERPL: 7.1 (ref 7–25)
CALCIUM SPEC-SCNC: 7.4 MG/DL (ref 8.6–10.5)
CHLORIDE SERPL-SCNC: 102 MMOL/L (ref 98–107)
CO2 SERPL-SCNC: 19 MMOL/L (ref 22–29)
CREAT SERPL-MCNC: 7.48 MG/DL (ref 0.76–1.27)
DEPRECATED RDW RBC AUTO: 53.5 FL (ref 37–54)
EGFRCR SERPLBLD CKD-EPI 2021: 7 ML/MIN/1.73
ERYTHROCYTE [DISTWIDTH] IN BLOOD BY AUTOMATED COUNT: 16.5 % (ref 12.3–15.4)
GLUCOSE SERPL-MCNC: 92 MG/DL (ref 65–99)
HCT VFR BLD AUTO: 23.5 % (ref 37.5–51)
HGB BLD-MCNC: 7.8 G/DL (ref 13–17.7)
MCH RBC QN AUTO: 29.5 PG (ref 26.6–33)
MCHC RBC AUTO-ENTMCNC: 33.2 G/DL (ref 31.5–35.7)
MCV RBC AUTO: 89 FL (ref 79–97)
O+P SPEC MICRO: NORMAL
O+P STL TRI STN: NORMAL
PHOSPHATE SERPL-MCNC: 5.5 MG/DL (ref 2.5–4.5)
PLATELET # BLD AUTO: 234 10*3/MM3 (ref 140–450)
PMV BLD AUTO: 9.1 FL (ref 6–12)
POTASSIUM SERPL-SCNC: 4.1 MMOL/L (ref 3.5–5.2)
RBC # BLD AUTO: 2.64 10*6/MM3 (ref 4.14–5.8)
RENIN PLAS-CCNC: 0.43 NG/ML/HR (ref 0.17–5.38)
SODIUM SERPL-SCNC: 138 MMOL/L (ref 136–145)
WBC NRBC COR # BLD AUTO: 13.68 10*3/MM3 (ref 3.4–10.8)

## 2024-05-29 PROCEDURE — 94799 UNLISTED PULMONARY SVC/PX: CPT

## 2024-05-29 PROCEDURE — 94664 DEMO&/EVAL PT USE INHALER: CPT

## 2024-05-29 PROCEDURE — 99232 SBSQ HOSP IP/OBS MODERATE 35: CPT | Performed by: INTERNAL MEDICINE

## 2024-05-29 PROCEDURE — 87799 DETECT AGENT NOS DNA QUANT: CPT | Performed by: INTERNAL MEDICINE

## 2024-05-29 PROCEDURE — 80069 RENAL FUNCTION PANEL: CPT | Performed by: INTERNAL MEDICINE

## 2024-05-29 PROCEDURE — 25810000003 SODIUM CHLORIDE 0.9 % SOLUTION: Performed by: INTERNAL MEDICINE

## 2024-05-29 PROCEDURE — 71045 X-RAY EXAM CHEST 1 VIEW: CPT

## 2024-05-29 PROCEDURE — 85027 COMPLETE CBC AUTOMATED: CPT | Performed by: INTERNAL MEDICINE

## 2024-05-29 RX ORDER — ECHINACEA PURPUREA EXTRACT 125 MG
2 TABLET ORAL AS NEEDED
Status: DISCONTINUED | OUTPATIENT
Start: 2024-05-29 | End: 2024-05-31 | Stop reason: HOSPADM

## 2024-05-29 RX ADMIN — SODIUM BICARBONATE 650 MG TABLET 1300 MG: at 17:30

## 2024-05-29 RX ADMIN — VANCOMYCIN HYDROCHLORIDE 125 MG: 125 CAPSULE ORAL at 11:43

## 2024-05-29 RX ADMIN — VANCOMYCIN HYDROCHLORIDE 125 MG: 125 CAPSULE ORAL at 17:30

## 2024-05-29 RX ADMIN — FLUDROCORTISONE ACETATE 50 MCG: 0.1 TABLET ORAL at 08:12

## 2024-05-29 RX ADMIN — MEGESTROL ACETATE 20 MG: 20 TABLET ORAL at 08:12

## 2024-05-29 RX ADMIN — SODIUM BICARBONATE 650 MG TABLET 1300 MG: at 08:12

## 2024-05-29 RX ADMIN — VANCOMYCIN HYDROCHLORIDE 125 MG: 125 CAPSULE ORAL at 05:33

## 2024-05-29 RX ADMIN — FERROUS SULFATE TAB 325 MG (65 MG ELEMENTAL FE) 325 MG: 325 (65 FE) TAB at 08:12

## 2024-05-29 RX ADMIN — PRAVASTATIN SODIUM 80 MG: 40 TABLET ORAL at 21:49

## 2024-05-29 RX ADMIN — BUDESONIDE AND FORMOTEROL FUMARATE DIHYDRATE 2 PUFF: 160; 4.5 AEROSOL RESPIRATORY (INHALATION) at 09:50

## 2024-05-29 RX ADMIN — Medication 5 MG: at 00:28

## 2024-05-29 RX ADMIN — Medication 5 MG: at 21:49

## 2024-05-29 RX ADMIN — TIOTROPIUM BROMIDE INHALATION SPRAY 2 PUFF: 3.12 SPRAY, METERED RESPIRATORY (INHALATION) at 09:49

## 2024-05-29 RX ADMIN — VANCOMYCIN HYDROCHLORIDE 125 MG: 125 CAPSULE ORAL at 00:24

## 2024-05-29 RX ADMIN — SODIUM BICARBONATE 650 MG TABLET 1300 MG: at 21:49

## 2024-05-29 RX ADMIN — SODIUM CHLORIDE 100 ML/HR: 9 INJECTION, SOLUTION INTRAVENOUS at 20:00

## 2024-05-29 RX ADMIN — BUDESONIDE AND FORMOTEROL FUMARATE DIHYDRATE 2 PUFF: 160; 4.5 AEROSOL RESPIRATORY (INHALATION) at 19:54

## 2024-05-29 NOTE — CASE MANAGEMENT/SOCIAL WORK
Continued Stay Note  [facility]     Patient Name: David Barfield  MRN: [id number]  Today's Date: 5/29/2024    Admit Date: 5/25/2024    Plan: Home   Discharge Plan       Row Name 05/29/24 1243       Plan    Plan Home    Patient/Family in Agreement with Plan yes    Plan Comments Discussed Mr. Barfield in MDR. Discharge plan is home when medically ready for discharge. CM will continue to follow for discharge needs.    Final Discharge Disposition Code 01 - home or self-care                   Discharge Codes    No documentation.                 Expected Discharge Date and Time       Expected Discharge Date Expected Discharge Time    Jun 1, 2024               Chapis Felder RN

## 2024-05-29 NOTE — PLAN OF CARE
Problem: Adjustment to Illness (Sepsis/Septic Shock)  Goal: Optimal Coping  Outcome: Ongoing, Progressing  Intervention: Optimize Psychosocial Adjustment to Illness  Recent Flowsheet Documentation  Taken 5/29/2024 0600 by Jeovany Griffith RN  Supportive Measures:   active listening utilized   decision-making supported  Family/Support System Care:   caregiver stress acknowledged   support provided   self-care encouraged   presence promoted   involvement promoted  Taken 5/29/2024 0200 by Jeovany Griffith, RN  Supportive Measures:   active listening utilized   decision-making supported  Family/Support System Care:   caregiver stress acknowledged   support provided   self-care encouraged   presence promoted   involvement promoted  Taken 5/29/2024 0000 by Jeovany Griffith RN  Supportive Measures:   active listening utilized   decision-making supported  Family/Support System Care:   caregiver stress acknowledged   support provided   self-care encouraged   presence promoted   involvement promoted  Taken 5/28/2024 2200 by Jeovany Griffith, RN  Supportive Measures:   active listening utilized   decision-making supported  Family/Support System Care:   caregiver stress acknowledged   support provided   self-care encouraged   presence promoted   involvement promoted  Taken 5/28/2024 2000 by Jeovany Griffith, RN  Supportive Measures:   active listening utilized   decision-making supported  Family/Support System Care:   caregiver stress acknowledged   support provided   self-care encouraged   presence promoted   involvement promoted     Problem: Bleeding (Sepsis/Septic Shock)  Goal: Absence of Bleeding  Outcome: Ongoing, Progressing     Problem: Glycemic Control Impaired (Sepsis/Septic Shock)  Goal: Blood Glucose Level Within Desired Range  Outcome: Ongoing, Progressing     Problem: Infection Progression (Sepsis/Septic Shock)  Goal: Absence of Infection Signs and Symptoms  Outcome: Ongoing,  Progressing  Intervention: Initiate Sepsis Management  Recent Flowsheet Documentation  Taken 5/29/2024 0600 by Jeovany Griffith, RN  Infection Prevention:   environmental surveillance performed   equipment surfaces disinfected   hand hygiene promoted   personal protective equipment utilized   rest/sleep promoted   single patient room provided  Isolation Precautions:   precautions maintained   contact  Taken 5/29/2024 0200 by Jeovany Griffith, RN  Infection Prevention:   environmental surveillance performed   equipment surfaces disinfected   hand hygiene promoted   personal protective equipment utilized   rest/sleep promoted   single patient room provided  Isolation Precautions:   precautions maintained   contact  Taken 5/29/2024 0000 by Jeovany Griffith RN  Infection Prevention:   environmental surveillance performed   equipment surfaces disinfected   hand hygiene promoted   personal protective equipment utilized   rest/sleep promoted   single patient room provided  Isolation Precautions:   precautions maintained   contact  Taken 5/28/2024 2200 by Jeovany Griffith RN  Infection Prevention:   environmental surveillance performed   equipment surfaces disinfected   hand hygiene promoted   personal protective equipment utilized   rest/sleep promoted   single patient room provided  Isolation Precautions:   precautions maintained   contact  Taken 5/28/2024 2000 by Jeovany Griffith RN  Infection Prevention:   environmental surveillance performed   equipment surfaces disinfected   hand hygiene promoted   personal protective equipment utilized   rest/sleep promoted   single patient room provided  Isolation Precautions:   precautions maintained   contact  Intervention: Promote Recovery  Recent Flowsheet Documentation  Taken 5/29/2024 0600 by Jeovany Griffith, RN  Activity Management:   activity encouraged   ambulated in room   sitting, edge of bed   standing at bedside   back to bed  Taken 5/29/2024 0200  by Jeovany Griffith, RN  Activity Management:   activity encouraged   ambulated in room   sitting, edge of bed   standing at bedside   back to bed  Taken 5/29/2024 0000 by Jeovany Griffith, RN  Activity Management:   activity encouraged   ambulated in room   sitting, edge of bed   standing at bedside   back to bed  Taken 5/28/2024 2200 by Jeovany Griffith, RN  Activity Management:   activity encouraged   ambulated in room   sitting, edge of bed   standing at bedside   back to bed  Taken 5/28/2024 2000 by Jeovany Griffith, RN  Activity Management:   activity encouraged   ambulated in room   sitting, edge of bed   standing at bedside   back to bed  Intervention: Promote Stabilization  Recent Flowsheet Documentation  Taken 5/29/2024 0600 by Jeovany Griffith RN  Fluid/Electrolyte Management: fluids provided  Taken 5/29/2024 0200 by Jeovany Griffith RN  Fluid/Electrolyte Management: fluids provided  Taken 5/29/2024 0000 by Jeovany Griffith RN  Fluid/Electrolyte Management: fluids provided  Taken 5/28/2024 2200 by Jeovany Griffith RN  Fluid/Electrolyte Management: fluids provided  Taken 5/28/2024 2000 by Jeovany Griffith RN  Fluid/Electrolyte Management: fluids provided     Problem: Nutrition Impaired (Sepsis/Septic Shock)  Goal: Optimal Nutrition Intake  Outcome: Ongoing, Progressing     Problem: Adult Inpatient Plan of Care  Goal: Plan of Care Review  Outcome: Ongoing, Progressing  Flowsheets (Taken 5/28/2024 1557 by Marie Becker, PT)  Progress: improving  Plan of Care Reviewed With:   patient   significant other  Outcome Evaluation: Pt with good effort and increased ambulation distance to 100' with CGA and no AD. Orthostatics taken with significant drop noted, however pt asymptomatic throughout. Pt continues to present below his functional baseline with weakness, impaired endurance, and generalized deconditioning. Further IPPT is warrented. PT will progress as able per POC.  Goal:  Patient-Specific Goal (Individualized)  Outcome: Ongoing, Progressing  Goal: Absence of Hospital-Acquired Illness or Injury  Outcome: Ongoing, Progressing  Intervention: Identify and Manage Fall Risk  Recent Flowsheet Documentation  Taken 5/29/2024 0600 by Jeovany Griffith, RN  Safety Promotion/Fall Prevention:   safety round/check completed   toileting scheduled   room organization consistent   nonskid shoes/slippers when out of bed   lighting adjusted   fall prevention program maintained   clutter free environment maintained   assistive device/personal items within reach   activity supervised  Taken 5/29/2024 0200 by Jeovany Griffith, RN  Safety Promotion/Fall Prevention:   safety round/check completed   toileting scheduled   room organization consistent   nonskid shoes/slippers when out of bed   lighting adjusted   fall prevention program maintained   clutter free environment maintained   assistive device/personal items within reach   activity supervised  Taken 5/29/2024 0000 by Jeovany Griffith, RN  Safety Promotion/Fall Prevention:   safety round/check completed   toileting scheduled   room organization consistent   nonskid shoes/slippers when out of bed   lighting adjusted   fall prevention program maintained   clutter free environment maintained   assistive device/personal items within reach   activity supervised  Taken 5/28/2024 2200 by Jeovany Griffith, RN  Safety Promotion/Fall Prevention:   safety round/check completed   toileting scheduled   room organization consistent   nonskid shoes/slippers when out of bed   lighting adjusted   fall prevention program maintained   clutter free environment maintained   assistive device/personal items within reach   activity supervised  Taken 5/28/2024 2000 by Jeovany Griffith, RN  Safety Promotion/Fall Prevention:   safety round/check completed   toileting scheduled   room organization consistent   nonskid shoes/slippers when out of bed   lighting  adjusted   fall prevention program maintained   clutter free environment maintained   assistive device/personal items within reach   activity supervised  Intervention: Prevent Skin Injury  Recent Flowsheet Documentation  Taken 5/29/2024 0600 by Jeovany Griffith RN  Body Position:   position changed independently   neutral head position   neutral body alignment   side-lying   left  Taken 5/29/2024 0200 by Jeovany Griffith RN  Body Position:   position changed independently   neutral head position   neutral body alignment   side-lying   left  Taken 5/29/2024 0000 by Jeovany Griffith RN  Body Position:   position changed independently   neutral head position   neutral body alignment   side-lying   left  Taken 5/28/2024 2200 by Jeovany Griffith RN  Body Position:   position changed independently   neutral head position   neutral body alignment   side-lying   left  Taken 5/28/2024 2000 by Jeovany Griffith RN  Body Position:   position changed independently   neutral head position   neutral body alignment   side-lying   left  Intervention: Prevent and Manage VTE (Venous Thromboembolism) Risk  Recent Flowsheet Documentation  Taken 5/29/2024 0600 by Jeovany Griffith, RN  Activity Management:   activity encouraged   ambulated in room   sitting, edge of bed   standing at bedside   back to bed  VTE Prevention/Management:   bilateral   sequential compression devices off   patient refused intervention  Taken 5/29/2024 0200 by Jeovany Griffith RN  Activity Management:   activity encouraged   ambulated in room   sitting, edge of bed   standing at bedside   back to bed  VTE Prevention/Management:   bilateral   sequential compression devices off   patient refused intervention  Range of Motion:   ROM (range of motion) performed   active ROM (range of motion) encouraged  Taken 5/29/2024 0000 by Jeovany Griffith RN  Activity Management:   activity encouraged   ambulated in room   sitting, edge of bed    standing at bedside   back to bed  VTE Prevention/Management:   bilateral   sequential compression devices off   patient refused intervention  Range of Motion:   ROM (range of motion) performed   active ROM (range of motion) encouraged  Taken 5/28/2024 2200 by Jeovany Griffith, RN  Activity Management:   activity encouraged   ambulated in room   sitting, edge of bed   standing at bedside   back to bed  VTE Prevention/Management:   bilateral   sequential compression devices off   patient refused intervention  Taken 5/28/2024 2000 by Jeovany Griffith, RN  Activity Management:   activity encouraged   ambulated in room   sitting, edge of bed   standing at bedside   back to bed  VTE Prevention/Management:   bilateral   sequential compression devices off   patient refused intervention  Range of Motion:   ROM (range of motion) performed   active ROM (range of motion) encouraged  Intervention: Prevent Infection  Recent Flowsheet Documentation  Taken 5/29/2024 0600 by Jeovany Griffith, RN  Infection Prevention:   environmental surveillance performed   equipment surfaces disinfected   hand hygiene promoted   personal protective equipment utilized   rest/sleep promoted   single patient room provided  Taken 5/29/2024 0200 by Jeovany Griffith RN  Infection Prevention:   environmental surveillance performed   equipment surfaces disinfected   hand hygiene promoted   personal protective equipment utilized   rest/sleep promoted   single patient room provided  Taken 5/29/2024 0000 by Jeovany Griffith, RN  Infection Prevention:   environmental surveillance performed   equipment surfaces disinfected   hand hygiene promoted   personal protective equipment utilized   rest/sleep promoted   single patient room provided  Taken 5/28/2024 2200 by Jeovany Griffith, RN  Infection Prevention:   environmental surveillance performed   equipment surfaces disinfected   hand hygiene promoted   personal protective equipment  utilized   rest/sleep promoted   single patient room provided  Taken 5/28/2024 2000 by Jeovany Griffith, RN  Infection Prevention:   environmental surveillance performed   equipment surfaces disinfected   hand hygiene promoted   personal protective equipment utilized   rest/sleep promoted   single patient room provided  Goal: Optimal Comfort and Wellbeing  Outcome: Ongoing, Progressing  Intervention: Provide Person-Centered Care  Recent Flowsheet Documentation  Taken 5/29/2024 0600 by Jeovany Griffith, RN  Trust Relationship/Rapport:   care explained   choices provided   emotional support provided   empathic listening provided   questions answered   questions encouraged   reassurance provided   thoughts/feelings acknowledged  Taken 5/29/2024 0200 by Jeovany Griffith, RN  Trust Relationship/Rapport:   care explained   choices provided   emotional support provided   empathic listening provided   questions answered   questions encouraged   reassurance provided   thoughts/feelings acknowledged  Taken 5/29/2024 0000 by Jeovany Griffith, RN  Trust Relationship/Rapport:   care explained   choices provided   emotional support provided   empathic listening provided   questions answered   questions encouraged   reassurance provided   thoughts/feelings acknowledged  Taken 5/28/2024 2200 by Jeovany Griffith, RN  Trust Relationship/Rapport:   care explained   choices provided   emotional support provided   empathic listening provided   questions answered   questions encouraged   reassurance provided   thoughts/feelings acknowledged  Taken 5/28/2024 2000 by Jeovany Griffith RN  Trust Relationship/Rapport:   care explained   choices provided   emotional support provided   empathic listening provided   questions answered   questions encouraged   reassurance provided   thoughts/feelings acknowledged  Goal: Readiness for Transition of Care  Outcome: Ongoing, Progressing     Problem: Pain Chronic (Persistent)  (Comorbidity Management)  Goal: Acceptable Pain Control and Functional Ability  Outcome: Ongoing, Progressing  Intervention: Manage Persistent Pain  Recent Flowsheet Documentation  Taken 5/29/2024 0600 by Jeovany Griffith RN  Medication Review/Management: medications reviewed  Taken 5/29/2024 0200 by Jeovany Griffith RN  Medication Review/Management: medications reviewed  Taken 5/29/2024 0000 by Jeovany Griffith RN  Medication Review/Management: medications reviewed  Taken 5/28/2024 2200 by Jeovany Griffith RN  Medication Review/Management: medications reviewed  Taken 5/28/2024 2000 by Jeovany Griffith RN  Medication Review/Management: medications reviewed  Intervention: Optimize Psychosocial Wellbeing  Recent Flowsheet Documentation  Taken 5/29/2024 0600 by Jeovany Griffith RN  Supportive Measures:   active listening utilized   decision-making supported  Diversional Activities:   [employer]  Family/Support System Care:   caregiver stress acknowledged   support provided   self-care encouraged   presence promoted   involvement promoted  Taken 5/29/2024 0200 by Jeovany Griffith RN  Supportive Measures:   active listening utilized   decision-making supported  Diversional Activities:   [employer]  Family/Support System Care:   caregiver stress acknowledged   support provided   self-care encouraged   presence promoted   involvement promoted  Taken 5/29/2024 0000 by Jeovany Griffith RN  Supportive Measures:   active listening utilized   decision-making supported  Diversional Activities:   [employer]  Family/Support System Care:   caregiver stress acknowledged   support provided   self-care encouraged   presence promoted   involvement promoted  Taken 5/28/2024 2200 by Jeovany Griffith, RN  Supportive Measures:   active listening utilized   decision-making supported  Diversional Activities:   music    movies   smartphone   television  Family/Support System Care:   caregiver stress acknowledged   support provided   self-care encouraged   presence promoted   involvement promoted  Taken 5/28/2024 2000 by Jeovany Griffith, RN  Supportive Measures:   active listening utilized   decision-making supported  Diversional Activities:   music   movies   smartphone   television  Family/Support System Care:   caregiver stress acknowledged   support provided   self-care encouraged   presence promoted   involvement promoted     Problem: Fall Injury Risk  Goal: Absence of Fall and Fall-Related Injury  Outcome: Ongoing, Progressing  Intervention: Identify and Manage Contributors  Recent Flowsheet Documentation  Taken 5/29/2024 0600 by Jeovany Griffith, RN  Medication Review/Management: medications reviewed  Self-Care Promotion:   independence encouraged   BADL personal objects within reach   BADL personal routines maintained   meal set-up provided  Taken 5/29/2024 0200 by Jeovany Griffith RN  Medication Review/Management: medications reviewed  Self-Care Promotion:   independence encouraged   BADL personal objects within reach   BADL personal routines maintained   meal set-up provided  Taken 5/29/2024 0000 by Jeovany Griffith RN  Medication Review/Management: medications reviewed  Self-Care Promotion:   independence encouraged   BADL personal objects within reach   BADL personal routines maintained   meal set-up provided  Taken 5/28/2024 2200 by Jeovany Griffith RN  Medication Review/Management: medications reviewed  Self-Care Promotion:   independence encouraged   BADL personal objects within reach   BADL personal routines maintained   meal set-up provided  Taken 5/28/2024 2000 by Jeovany Griffith RN  Medication Review/Management: medications reviewed  Self-Care Promotion:   independence encouraged   BADL personal objects within reach   BADL personal routines maintained   meal set-up provided  Intervention:  Promote Injury-Free Environment  Recent Flowsheet Documentation  Taken 5/29/2024 0600 by Jeovany Griffith, RN  Safety Promotion/Fall Prevention:   safety round/check completed   toileting scheduled   room organization consistent   nonskid shoes/slippers when out of bed   lighting adjusted   fall prevention program maintained   clutter free environment maintained   assistive device/personal items within reach   activity supervised  Taken 5/29/2024 0200 by Jeovany Griffith, RN  Safety Promotion/Fall Prevention:   safety round/check completed   toileting scheduled   room organization consistent   nonskid shoes/slippers when out of bed   lighting adjusted   fall prevention program maintained   clutter free environment maintained   assistive device/personal items within reach   activity supervised  Taken 5/29/2024 0000 by Jeovany Griffith, RN  Safety Promotion/Fall Prevention:   safety round/check completed   toileting scheduled   room organization consistent   nonskid shoes/slippers when out of bed   lighting adjusted   fall prevention program maintained   clutter free environment maintained   assistive device/personal items within reach   activity supervised  Taken 5/28/2024 2200 by Jeovany Griffith, RN  Safety Promotion/Fall Prevention:   safety round/check completed   toileting scheduled   room organization consistent   nonskid shoes/slippers when out of bed   lighting adjusted   fall prevention program maintained   clutter free environment maintained   assistive device/personal items within reach   activity supervised  Taken 5/28/2024 2000 by Jeovany Griffith, RN  Safety Promotion/Fall Prevention:   safety round/check completed   toileting scheduled   room organization consistent   nonskid shoes/slippers when out of bed   lighting adjusted   fall prevention program maintained   clutter free environment maintained   assistive device/personal items within reach   activity supervised   Goal Outcome  Evaluation:

## 2024-05-29 NOTE — PROGRESS NOTES
"   LOS: 2 days    Patient Care Team:  Hayley Castellanos APRN as PCP - General (Nurse Practitioner)  Octaviano Sampson MD as Consulting Physician (Pulmonary Disease)  Neetu Ashley MD as Referring Physician (Hematology and Oncology)  Nimo Rodríguez MD as Consulting Physician (Radiation Oncology)  [place], [place] as Ambulatory  ([facility])    Chief Complaint:  Low BP     Subjective     Interval History:   Bp significant improved on midodrine and florinef. Appetite improving. Getting IV fluid      Review of Systems:   Patient denies shortness of breath, chest pain, dysuria, hematuria, nausea, vomiting.        Objective     Vital Sign Min/Max for last 24 hours  Temp  Min: 97.4 °F (36.3 °C)  Max: 98.3 °F (36.8 °C)   BP  Min: 152/91  Max: 175/85   Pulse  Min: 70  Max: 78   Resp  Min: 16  Max: 18   SpO2  Min: 96 %  Max: 100 %   No data recorded   Weight  Min: 84 kg (185 lb 3 oz)  Max: 84 kg (185 lb 3 oz)     Flowsheet Rows      Flowsheet Row First Filed Value   Admission Height 182.9 cm (72\") Documented at 05/25/2024 0315   Admission Weight 78 kg (172 lb) Documented at 05/25/2024 0315            I/O this shift:  In: -   Out: 2850 [Urine:2850]  I/O last 3 completed shifts:  In: 1365 [P.O.:1365]  Out: 3850 [Urine:3850]    Physical Exam:    General Appearance: Alert, oriented, no obvious distress.  Eyes: PER, EOMI.  Neck: Supple no JVD.  Lungs: Clear auscultation, no rales rhonchi's, equal chest movement, nonlabored.  Heart: No gallop, murmur, rub, RRR.  Abdomen: Soft, nontender, positive bowel sounds, no organomegaly.  Extremities: No edema, no cyanosis.  Neuro: No focal deficit, moving all extremities, alert oriented X 3        WBC WBC   Date Value Ref Range Status   05/29/2024 13.68 (H) 3.40 - 10.80 10*3/mm3 Final   05/28/2024 16.03 (H) 3.40 - 10.80 10*3/mm3 Final   05/27/2024 21.90 (H) 3.40 - 10.80 10*3/mm3 Final      HGB Hemoglobin   Date Value Ref Range Status   05/29/2024 7.8 (L) 13.0 - " "17.7 g/dL Final   05/28/2024 7.8 (L) 13.0 - 17.7 g/dL Final   05/27/2024 8.0 (L) 13.0 - 17.7 g/dL Final      HCT Hematocrit   Date Value Ref Range Status   05/29/2024 23.5 (L) 37.5 - 51.0 % Final   05/28/2024 23.8 (L) 37.5 - 51.0 % Final   05/27/2024 24.8 (L) 37.5 - 51.0 % Final      Platlets No results found for: \"LABPLAT\"   MCV MCV   Date Value Ref Range Status   05/29/2024 89.0 79.0 - 97.0 fL Final   05/28/2024 89.8 79.0 - 97.0 fL Final   05/27/2024 91.5 79.0 - 97.0 fL Final          Sodium Sodium   Date Value Ref Range Status   05/29/2024 138 136 - 145 mmol/L Final   05/28/2024 130 (L) 136 - 145 mmol/L Final   05/27/2024 132 (L) 136 - 145 mmol/L Final      Potassium Potassium   Date Value Ref Range Status   05/29/2024 4.1 3.5 - 5.2 mmol/L Final   05/28/2024 4.0 3.5 - 5.2 mmol/L Final   05/27/2024 4.2 3.5 - 5.2 mmol/L Final      Chloride Chloride   Date Value Ref Range Status   05/29/2024 102 98 - 107 mmol/L Final   05/28/2024 96 (L) 98 - 107 mmol/L Final   05/27/2024 99 98 - 107 mmol/L Final      CO2 CO2   Date Value Ref Range Status   05/29/2024 19.0 (L) 22.0 - 29.0 mmol/L Final   05/28/2024 19.0 (L) 22.0 - 29.0 mmol/L Final   05/27/2024 18.0 (L) 22.0 - 29.0 mmol/L Final      BUN BUN   Date Value Ref Range Status   05/29/2024 53 (H) 8 - 23 mg/dL Final   05/28/2024 51 (H) 8 - 23 mg/dL Final   05/27/2024 48 (H) 8 - 23 mg/dL Final      Creatinine Creatinine   Date Value Ref Range Status   05/29/2024 7.48 (H) 0.76 - 1.27 mg/dL Final   05/28/2024 7.90 (H) 0.76 - 1.27 mg/dL Final   05/27/2024 6.94 (H) 0.76 - 1.27 mg/dL Final      Calcium Calcium   Date Value Ref Range Status   05/29/2024 7.4 (L) 8.6 - 10.5 mg/dL Final   05/28/2024 7.9 (L) 8.6 - 10.5 mg/dL Final   05/27/2024 7.6 (L) 8.6 - 10.5 mg/dL Final      PO4 No results found for: \"CAPO4\"   Albumin Albumin   Date Value Ref Range Status   05/29/2024 2.5 (L) 3.5 - 5.2 g/dL Final   05/28/2024 2.5 (L) 3.5 - 5.2 g/dL Final      Magnesium No results found for: \"MG\" "   Uric Acid Uric Acid   Date Value Ref Range Status   05/27/2024 7.2 (H) 3.4 - 7.0 mg/dL Final     Comment:     Falsely depressed results may occur on samples drawn from patients receiving N-Acetylcysteine (NAC) or Metamizole.           Results Review:     I reviewed the patient's new clinical results.    budesonide-formoterol, 2 puff, Inhalation, BID - RT   And  tiotropium bromide monohydrate, 2 puff, Inhalation, Daily - RT  ferrous sulfate, 325 mg, Oral, Daily With Breakfast  fludrocortisone, 50 mcg, Oral, Daily  megestrol, 20 mg, Oral, Daily  pravastatin, 80 mg, Oral, Nightly  sodium bicarbonate, 1,300 mg, Oral, TID  vancomycin, 125 mg, Oral, Q6H      sodium chloride, 100 mL/hr, Last Rate: 100 mL/hr (05/28/24 1635)        Medication Review: yes    Results from last 7 days   Lab Units 05/29/24  0633 05/28/24  0839 05/27/24  0628 05/26/24  0509 05/25/24  0343   SODIUM mmol/L 138 130* 132* 133* 135*   POTASSIUM mmol/L 4.1 4.0 4.2 4.1 3.9   CHLORIDE mmol/L 102 96* 99 100 105   CO2 mmol/L 19.0* 19.0* 18.0* 21.0* 13.0*   BUN mg/dL 53* 51* 48* 40* 28*   CREATININE mg/dL 7.48* 7.90* 6.94* 5.94* 3.27*   CALCIUM mg/dL 7.4* 7.9* 7.6* 8.0* 8.4*   ALBUMIN g/dL 2.5* 2.5*  --  2.4* 2.7*   WBC 10*3/mm3 13.68* 16.03* 21.90* 23.95* 30.99*   HEMOGLOBIN g/dL 7.8* 7.8* 8.0* 8.4* 10.3*   PLATELETS 10*3/mm3 234 233 235 227 294           Assessment & Plan       Hx of hypotension    Mixed hyperlipidemia    Centrilobular emphysema    Tobacco abuse    Malignant neoplasm of lower lobe of right lung    GERD without esophagitis    ARACELI (acute kidney injury)    CAD (coronary artery disease)    Stage 4 chronic kidney disease    Hypotension    Dehydration    C. difficile colitis    UTI (urinary tract infection)    1.  ARACELI on CKD stage IV -7.9 creatinine peaked around 7.6 to last admission, patient went home with a creatinine slowly coming down to 5 range.  Serologic workup -ANCA (-), Unclear etiology - DDx- AIN -y vs pre-renal/atn from  hypotension.   2.  Proteinuria   3.  Hypotension - Cortisol level - 15.44 and ACTH pending. level 2 weeks back was 26.33 - Adrenal insufficiency reported with Opdivo in literature along with other endocrine abnormalities. Per wife, he has salt craving and weakness even before Opdivo. ACTH stimulation test result normal, DHEA- normal, Renin activity normal. Aldosterone low 2.3  4.  Hyponatremia    5. Anemia   6.  Metabolic acidosis.   7.  Infectious disease: Patient's followed with ID consultants.  8.  S/p immunotherapy for non-small cell cancer last infusion 4/4/2024 Opdivo.  9- 10- C diff colitis - on vancomycin.      Recommendation:  ARACELI Etiology unspecified Hemodynamic injury due to recurrent hypotension vs AIN in the setting of immunotherapy. We discussed about possibility of kidney bx on this admission if no improvement in renal function is observed. Continue with current management plan for now.        High risk complex patient multiple medical problems.    Jeronimo Clarke MD  05/29/24  18:02 EDT

## 2024-05-29 NOTE — PROGRESS NOTES
"    [facility] Medicine Services  PROGRESS NOTE    Patient Name: David Barfield  : 1950  MRN: [id number]    Date of Admission: 2024  Primary Care Physician: Hayley Castellanos APRN    Subjective   Subjective     CC:  Low BP    HPI:  States that he feels ok.  Reports that urinating.  Girlfriend at bedside worried that he's \"smothering.\"  He states that breathing ok.  C/o nose feels dry.        Objective   Objective     Vital Signs:   Temp:  [97.5 °F (36.4 °C)-98.7 °F (37.1 °C)] 97.5 °F (36.4 °C)  Heart Rate:  [70-74] 70  Resp:  [16-18] 18  BP: (152-180)/() 152/91     Physical Exam:  Constitutional:  male no acute distress, awake, alert, girlfriend at bedside  HENT: NCAT, mucous membranes moist  Respiratory: Clear to auscultation bilaterally, respiratory effort normal   Cardiovascular: RRR, no murmurs, rubs, or gallops  Gastrointestinal: Positive bowel sounds, soft, nontender, nondistended  Musculoskeletal: No bilateral ankle edema  Psychiatric: Appropriate affect, cooperative  Neurologic: Oriented x 3, delayed thought, speech clear  Skin: No rashes    Results Reviewed:  LAB RESULTS:      Lab 24  0633 24  0839 24  0857 24  0628 24  0509 24  0718 24  0343 24  0715   WBC 13.68* 16.03*  --  21.90* 23.95*  --  30.99* 15.69*   HEMOGLOBIN 7.8* 7.8*  --  8.0* 8.4*  --  10.3* 9.0*   HEMATOCRIT 23.5* 23.8*  --  24.8* 25.5*  --  31.6* 27.6*   PLATELETS 234 233  --  235 227  --  294 269   NEUTROS ABS  --   --   --   --  20.34*  --  28.51* 12.05*   IMMATURE GRANS (ABS)  --   --   --   --  0.38*  --   --  0.31*   LYMPHS ABS  --   --   --   --  1.80  --   --  1.35   MONOS ABS  --   --   --   --  1.35*  --   --  1.89*   EOS ABS  --   --   --   --  0.02  --  0.00 0.05   MCV 89.0 89.8  --  91.5 90.1  --  88.5 89.6   LACTATE  --   --   --   --   --  2.1* 2.3*  --    LDH  --   --  181  --   --   --   --   --          Lab " 05/29/24  0633 05/28/24  0839 05/27/24  0628 05/26/24  0509 05/25/24  0343   SODIUM 138 130* 132* 133* 135*   POTASSIUM 4.1 4.0 4.2 4.1 3.9   CHLORIDE 102 96* 99 100 105   CO2 19.0* 19.0* 18.0* 21.0* 13.0*   ANION GAP 17.0* 15.0 15.0 12.0 17.0*   BUN 53* 51* 48* 40* 28*   CREATININE 7.48* 7.90* 6.94* 5.94* 3.27*   EGFR 7.0* 6.6* 7.7* 9.3* 18.9*   GLUCOSE 92 123* 101* 95 102*   CALCIUM 7.4* 7.9* 7.6* 8.0* 8.4*   PHOSPHORUS 5.5* 4.1  --  3.4  --          Lab 05/29/24  0633 05/28/24  0839 05/26/24  0509 05/25/24  0343   TOTAL PROTEIN  --   --   --  6.3   ALBUMIN 2.5* 2.5* 2.4* 2.7*   GLOBULIN  --   --   --  3.6   ALT (SGPT)  --   --   --  22   AST (SGOT)  --   --   --  25   BILIRUBIN  --   --   --  0.3   ALK PHOS  --   --   --  108         Lab 05/25/24  0343   HSTROP T 31*                 Brief Urine Lab Results  (Last result in the past 365 days)        Color   Clarity   Blood   Leuk Est   Nitrite   Protein   CREAT   Urine HCG        05/27/24 1202             25.0         05/27/24 1202 Yellow   Clear   Trace   Large (3+)   Negative   100 mg/dL (2+)                   Cultures:  Blood Culture   Date Value Ref Range Status   05/25/2024 No growth at 24 hours  Preliminary   05/25/2024 No growth at 24 hours  Preliminary   05/20/2024 No growth at 5 days  Final   05/20/2024 No growth at 5 days  Final   05/20/2024 No growth at 5 days  Final   05/19/2024 No growth at 5 days  Final   05/19/2024 No growth at 5 days  Final     Urine Culture   Date Value Ref Range Status   05/19/2024 No growth  Final       Microbiology Results Abnormal       Procedure Component Value - Date/Time    Blood Culture - Blood, Hand, Left [[id number]]  (Normal) Collected: 05/25/24 0506    Lab Status: Preliminary result Specimen: Blood from Hand, Left Updated: 05/29/24 0730     Blood Culture No growth at 4 days    Narrative:      Less than seven (7) mL's of blood was collected.  Insufficient quantity may yield false negative results.    Blood Culture -  Blood, Hand, Right [[id number]]  (Normal) Collected: 05/25/24 0506    Lab Status: Preliminary result Specimen: Blood from Hand, Right Updated: 05/29/24 0730     Blood Culture No growth at 4 days    Narrative:      Less than seven (7) mL's of blood was collected.  Insufficient quantity may yield false negative results.    Eosinophil Smear - Urine, Urine, Clean Catch [[id number]]  (Normal) Collected: 05/27/24 1202    Lab Status: Final result Specimen: Urine, Clean Catch Updated: 05/27/24 1251     Eosinophil Smear 0 % EOS/100 Cells     Narrative:      No eosinophil seen    Urine Culture - Urine, Urine, Clean Catch [[id number]]  (Normal) Collected: 05/25/24 0354    Lab Status: Final result Specimen: Urine, Clean Catch Updated: 05/26/24 1220     Urine Culture No growth            US Renal Bilateral    Result Date: 5/28/2024  US RENAL BILATERAL Date of Exam: 5/28/2024 11:12 AM EDT Indication: ARACELI. Comparison: No comparisons available. Technique: Grayscale and color Doppler ultrasound evaluation of the kidneys and urinary bladder was performed. Findings: Kidneys are normal in size and shape bilaterally. Note is made of a right pleural effusion. There is no right hydronephrosis. There is caliectasis of the left upper pole kidney. There is normal blood flow to both kidneys. There are no renal masses. Ureteral jets appear normal. There is moderate prostate enlargement. There are prostate calcifications.     Impression: Impression: Caliectasis of the left upper pole kidney. Right pleural effusion. Enlarged prostate gland with calcifications. Electronically Signed: Elsa Schmid MD  5/28/2024 12:38 PM EDT  Workstation ID: [id number]     Results for orders placed during the hospital encounter of 05/05/24    Adult Transesophageal Echo 3D ([facility]N) W/ Cont If Necessary Per Protocol    Interpretation Summary    Left ventricular systolic function is normal. Left ventricular ejection fraction appears to be 61 - 65%. Normal left ventricular  cavity size noted. Left ventricular wall thickness is consistent with mild concentric hypertrophy. All left ventricular wall segments contract normally.    There is mild calcification of the aortic valve. The aortic valve appears trileaflet. Mild aortic valve regurgitation is present. No aortic valve stenosis is present. There is no evidence of an aortic valve mass is present.    There is mild calcification of the mitral valve. There is mild, bileaflet mitral valve thickening present. No evidence of a mitral valve mass is present. Mild to moderate mitral valve regurgitation is present. No significant mitral valve stenosis is present.    The tricuspid valve is structurally normal with no significant stenosis present. There is no evidence of a mass on the tricuspid valve. Trace tricuspid valve regurgitation is present.    The pulmonic valve is grossly normal in structure. There is trace pulmonic valve regurgitation present.    No vegetation seen on atrial aspect of pacemaker leads.  The most distal aspects of the RV lead were not completely visualized however there was no apparent vegetation in the more proximal segment, adjacent to the tricuspid valve which appears to be functioning normally.      Current medications:  Scheduled Meds:budesonide-formoterol, 2 puff, Inhalation, BID - RT   And  tiotropium bromide monohydrate, 2 puff, Inhalation, Daily - RT  ferrous sulfate, 325 mg, Oral, Daily With Breakfast  fludrocortisone, 50 mcg, Oral, Daily  megestrol, 20 mg, Oral, Daily  pravastatin, 80 mg, Oral, Nightly  sodium bicarbonate, 1,300 mg, Oral, TID  vancomycin, 125 mg, Oral, Q6H      Continuous Infusions:sodium chloride, 100 mL/hr, Last Rate: 100 mL/hr (05/28/24 0085)      PRN Meds:.  acetaminophen **OR** acetaminophen **OR** acetaminophen    albuterol sulfate HFA    melatonin    midodrine    nitroglycerin    ondansetron    sodium chloride    Assessment & Plan   Assessment & Plan     Active Hospital Problems     Diagnosis  POA    **Hx of hypotension [Z86.79]  Not Applicable    Hypotension [I95.9]  Yes    Dehydration [E86.0]  Yes    C. difficile colitis [A04.72]  Yes    UTI (urinary tract infection) [N39.0]  Unknown    Stage 4 chronic kidney disease [N18.4]  Yes    CAD (coronary artery disease) [I25.10]  Yes    ARACELI (acute kidney injury) [N17.9]  Yes    GERD without esophagitis [K21.9]  Yes    Malignant neoplasm of lower lobe of right lung [C34.31]  Yes    Centrilobular emphysema [J43.2]  Yes    Tobacco abuse [Z72.0]  Yes    Mixed hyperlipidemia [E78.2]  Yes      Resolved Hospital Problems   No resolved problems to display.        Brief Hospital Course to date:  - detailed assessment and plan from admission reviewed  -History of non-small cell lung cancer admitted for low BP, nausea, vomiting and generalized weakness.     Hypotension, fluid responsive  - BP improved after IV fluids  - Recent workup included TTE, appropriate cortisol, normal TSH, ACTH stimulation test was negative  - Continue IV fluids  - Encourage oral intake/megace would need to increase to 200-400mg but waiting with his current GFR  - Continue midodrine  --05/26:  Dr. Ríos, started fludrocortisone along with midodrine and IV fluids.  BP much better     ARACELI on CKD stage IV  Metabolic acidosis  --Appreciate nephrology input.  --Mild decrease in creatinine today.  ?ATN from hypotension.  Nephro following.   --renal u/s shows caliectasis of left upper pole kidney.  ?need for  consult defer to nephrology       C. difficile colitis  - Continue oral vancomycin 125 mg every 6 hours for total of 2 weeks  - ID consulted.  D/w Dr. Derian Barry  - CT abdomen/pelvis without contrast c/w colitis predominantly ascending     Leukocytosis  - Trending down.  - UA noted  - Continue oral vancomycin  - Per ID hold on further IV antibiotics at this time        NSCLC  - Status post neoadjuvant chemo, RLL lobectomy 1/20/2024, immunotherapy (last dose of optivo 4/4/2024)  -  Follows with Dr. Ashley  -Per significant other all treatment on hold currently     CAD  AV block status post PPM  Hx hypertension  - Continue statin, hold amlodipine due to hypotension     Chronic anemia  - Monitor CBC.  Stable currently.  Likely component of anemia of chronic disease d/t kidneys  - Continue iron supplement     Emphysema  Tobacco abuse  -Trelegy    Dyspnea  --check CXR with the IVF.  Sats are 99% on RA    Expected Discharge Location and Transportation: home with [facility]  Expected Discharge   Expected Discharge Date: 6/1/2024; Expected Discharge Time:      Long d/w girlfriend at bedside and explained patient's dilan and can take a long time to recover.      DVT prophylaxis:  Mechanical DVT prophylaxis orders are present.         AM-PAC 6 Clicks Score (PT): 23 (05/29/24 0714)    CODE STATUS:   Code Status and Medical Interventions:   Ordered at: 05/25/24 0618     Level Of Support Discussed With:    Patient     Code Status (Patient has no pulse and is not breathing):    CPR (Attempt to Resuscitate)     Medical Interventions (Patient has pulse or is breathing):    Full Support       Buddy Lawrence MD  05/29/24

## 2024-05-30 LAB
ALBUMIN SERPL-MCNC: 2.9 G/DL (ref 3.5–5.2)
ANION GAP SERPL CALCULATED.3IONS-SCNC: 15 MMOL/L (ref 5–15)
BACTERIA SPEC AEROBE CULT: NORMAL
BACTERIA SPEC AEROBE CULT: NORMAL
BUN SERPL-MCNC: 44 MG/DL (ref 8–23)
BUN/CREAT SERPL: 6.4 (ref 7–25)
CALCIUM SPEC-SCNC: 7.5 MG/DL (ref 8.6–10.5)
CHLORIDE SERPL-SCNC: 103 MMOL/L (ref 98–107)
CO2 SERPL-SCNC: 21 MMOL/L (ref 22–29)
CREAT SERPL-MCNC: 6.88 MG/DL (ref 0.76–1.27)
DEPRECATED RDW RBC AUTO: 53.4 FL (ref 37–54)
EGFRCR SERPLBLD CKD-EPI 2021: 7.8 ML/MIN/1.73
ERYTHROCYTE [DISTWIDTH] IN BLOOD BY AUTOMATED COUNT: 16.5 % (ref 12.3–15.4)
GLUCOSE SERPL-MCNC: 105 MG/DL (ref 65–99)
HCT VFR BLD AUTO: 25.1 % (ref 37.5–51)
HGB BLD-MCNC: 8.1 G/DL (ref 13–17.7)
MCH RBC QN AUTO: 28.6 PG (ref 26.6–33)
MCHC RBC AUTO-ENTMCNC: 32.3 G/DL (ref 31.5–35.7)
MCV RBC AUTO: 88.7 FL (ref 79–97)
PHOSPHATE SERPL-MCNC: 5.9 MG/DL (ref 2.5–4.5)
PLATELET # BLD AUTO: 257 10*3/MM3 (ref 140–450)
PMV BLD AUTO: 8.4 FL (ref 6–12)
POTASSIUM SERPL-SCNC: 3.8 MMOL/L (ref 3.5–5.2)
RBC # BLD AUTO: 2.83 10*6/MM3 (ref 4.14–5.8)
SODIUM SERPL-SCNC: 139 MMOL/L (ref 136–145)
WBC NRBC COR # BLD AUTO: 14.04 10*3/MM3 (ref 3.4–10.8)

## 2024-05-30 PROCEDURE — 94664 DEMO&/EVAL PT USE INHALER: CPT

## 2024-05-30 PROCEDURE — 80069 RENAL FUNCTION PANEL: CPT | Performed by: INTERNAL MEDICINE

## 2024-05-30 PROCEDURE — 99232 SBSQ HOSP IP/OBS MODERATE 35: CPT | Performed by: NURSE PRACTITIONER

## 2024-05-30 PROCEDURE — 94799 UNLISTED PULMONARY SVC/PX: CPT

## 2024-05-30 PROCEDURE — 25810000003 SODIUM CHLORIDE 0.9 % SOLUTION: Performed by: INTERNAL MEDICINE

## 2024-05-30 PROCEDURE — 85027 COMPLETE CBC AUTOMATED: CPT | Performed by: INTERNAL MEDICINE

## 2024-05-30 RX ADMIN — BUDESONIDE AND FORMOTEROL FUMARATE DIHYDRATE 2 PUFF: 160; 4.5 AEROSOL RESPIRATORY (INHALATION) at 10:00

## 2024-05-30 RX ADMIN — PRAVASTATIN SODIUM 80 MG: 40 TABLET ORAL at 21:22

## 2024-05-30 RX ADMIN — FERROUS SULFATE TAB 325 MG (65 MG ELEMENTAL FE) 325 MG: 325 (65 FE) TAB at 09:09

## 2024-05-30 RX ADMIN — VANCOMYCIN HYDROCHLORIDE 125 MG: 125 CAPSULE ORAL at 00:42

## 2024-05-30 RX ADMIN — VANCOMYCIN HYDROCHLORIDE 125 MG: 125 CAPSULE ORAL at 05:30

## 2024-05-30 RX ADMIN — MEGESTROL ACETATE 20 MG: 20 TABLET ORAL at 09:09

## 2024-05-30 RX ADMIN — VANCOMYCIN HYDROCHLORIDE 125 MG: 125 CAPSULE ORAL at 13:42

## 2024-05-30 RX ADMIN — SODIUM BICARBONATE 650 MG TABLET 1300 MG: at 21:22

## 2024-05-30 RX ADMIN — SODIUM BICARBONATE 650 MG TABLET 1300 MG: at 16:10

## 2024-05-30 RX ADMIN — BUDESONIDE AND FORMOTEROL FUMARATE DIHYDRATE 2 PUFF: 160; 4.5 AEROSOL RESPIRATORY (INHALATION) at 20:04

## 2024-05-30 RX ADMIN — Medication 5 MG: at 21:22

## 2024-05-30 RX ADMIN — FLUDROCORTISONE ACETATE 50 MCG: 0.1 TABLET ORAL at 09:09

## 2024-05-30 RX ADMIN — SODIUM BICARBONATE 650 MG TABLET 1300 MG: at 09:09

## 2024-05-30 RX ADMIN — VANCOMYCIN HYDROCHLORIDE 125 MG: 125 CAPSULE ORAL at 17:18

## 2024-05-30 RX ADMIN — SODIUM CHLORIDE 100 ML/HR: 9 INJECTION, SOLUTION INTRAVENOUS at 05:30

## 2024-05-30 NOTE — PROGRESS NOTES
[facility] Medicine Services  PROGRESS NOTE    Patient Name: David Barfield  : 1950  MRN: [id number]    Date of Admission: 2024  Primary Care Physician: Hayley Castellanos APRN    Subjective   Subjective     CC:  F/u low BP    HPI:  Resting in bed.  Wife is at the bedside.  BP has improved.  IV fluids stopped.  Patient and wife state plan is to monitor patient off IV fluids to see how his blood pressure does.  Discussed effects of fludrocortisone that was added to medication regimen.      Objective   Objective     Vital Signs:   Temp:  [97.4 °F (36.3 °C)-98.5 °F (36.9 °C)] 98.5 °F (36.9 °C)  Heart Rate:  [70-78] 78  Resp:  [16-19] 18  BP: (142-175)/(79-93) 142/79     Physical Exam:  Constitutional: No acute distress, awake, alert  HENT: NCAT, mucous membranes moist  Respiratory: Clear to auscultation bilaterally, respiratory effort normal, room air  Cardiovascular: RRR, no murmurs, rubs, or gallops  Gastrointestinal: Positive bowel sounds, soft, nontender, nondistended  Musculoskeletal: No bilateral ankle edema  Psychiatric: Appropriate affect, cooperative  Neurologic: Oriented x 3, moves all extremities, speech clear  Skin: No rashes      Results Reviewed:  LAB RESULTS:      Lab 24  0522 24  0633 24  0839 24  0857 24  0628 24  0509 24  0718 24  0343   WBC 14.04* 13.68* 16.03*  --  21.90* 23.95*  --  30.99*   HEMOGLOBIN 8.1* 7.8* 7.8*  --  8.0* 8.4*  --  10.3*   HEMATOCRIT 25.1* 23.5* 23.8*  --  24.8* 25.5*  --  31.6*   PLATELETS 257 234 233  --  235 227  --  294   NEUTROS ABS  --   --   --   --   --  20.34*  --  28.51*   IMMATURE GRANS (ABS)  --   --   --   --   --  0.38*  --   --    LYMPHS ABS  --   --   --   --   --  1.80  --   --    MONOS ABS  --   --   --   --   --  1.35*  --   --    EOS ABS  --   --   --   --   --  0.02  --  0.00   MCV 88.7 89.0 89.8  --  91.5 90.1  --  88.5   LACTATE  --   --   --   --   --   --  2.1*  2.3*   LDH  --   --   --  181  --   --   --   --          Lab 05/30/24  0522 05/29/24  0633 05/28/24  0839 05/27/24  0628 05/26/24  0509   SODIUM 139 138 130* 132* 133*   POTASSIUM 3.8 4.1 4.0 4.2 4.1   CHLORIDE 103 102 96* 99 100   CO2 21.0* 19.0* 19.0* 18.0* 21.0*   ANION GAP 15.0 17.0* 15.0 15.0 12.0   BUN 44* 53* 51* 48* 40*   CREATININE 6.88* 7.48* 7.90* 6.94* 5.94*   EGFR 7.8* 7.0* 6.6* 7.7* 9.3*   GLUCOSE 105* 92 123* 101* 95   CALCIUM 7.5* 7.4* 7.9* 7.6* 8.0*   PHOSPHORUS 5.9* 5.5* 4.1  --  3.4         Lab 05/30/24 0522 05/29/24  0633 05/28/24  0839 05/26/24  0509 05/25/24  0343   TOTAL PROTEIN  --   --   --   --  6.3   ALBUMIN 2.9* 2.5* 2.5* 2.4* 2.7*   GLOBULIN  --   --   --   --  3.6   ALT (SGPT)  --   --   --   --  22   AST (SGOT)  --   --   --   --  25   BILIRUBIN  --   --   --   --  0.3   ALK PHOS  --   --   --   --  108         Lab 05/25/24  0343   HSTROP T 31*                 Brief Urine Lab Results  (Last result in the past 365 days)        Color   Clarity   Blood   Leuk Est   Nitrite   Protein   CREAT   Urine HCG        05/27/24 1202             25.0         05/27/24 1202 Yellow   Clear   Trace   Large (3+)   Negative   100 mg/dL (2+)                   Microbiology Results Abnormal       Procedure Component Value - Date/Time    Blood Culture - Blood, Hand, Left [[id number]]  (Normal) Collected: 05/25/24 0506    Lab Status: Final result Specimen: Blood from Hand, Left Updated: 05/30/24 0730     Blood Culture No growth at 5 days    Narrative:      Less than seven (7) mL's of blood was collected.  Insufficient quantity may yield false negative results.    Blood Culture - Blood, Hand, Right [[id number]]  (Normal) Collected: 05/25/24 0506    Lab Status: Final result Specimen: Blood from Hand, Right Updated: 05/30/24 0730     Blood Culture No growth at 5 days    Narrative:      Less than seven (7) mL's of blood was collected.  Insufficient quantity may yield false negative results.    Eosinophil Smear -  Urine, Urine, Clean Catch [[id number]]  (Normal) Collected: 05/27/24 1202    Lab Status: Final result Specimen: Urine, Clean Catch Updated: 05/27/24 1251     Eosinophil Smear 0 % EOS/100 Cells     Narrative:      No eosinophil seen    Urine Culture - Urine, Urine, Clean Catch [[id number]]  (Normal) Collected: 05/25/24 0354    Lab Status: Final result Specimen: Urine, Clean Catch Updated: 05/26/24 1220     Urine Culture No growth            XR Chest 1 View    Result Date: 5/29/2024  XR CHEST 1 VW Date of Exam: 5/29/2024 12:00 PM EDT Indication: dyspnea Comparison: 5/19/2024 Findings: Right-sided pacemaker and left chest port again noted. Heart size is within normal limits. There is a moderate sized right pleural effusion, increased. There is a probable small left pleural effusion, new. There is increasing mild atelectasis of the lung  bases. There are chronic emphysematous changes of the lung fields.     Impression: Impression: Moderate sized right effusion, increased from prior study. Mild bibasilar atelectasis. Probable small left effusion. Electronically Signed: Elsa Schmid MD  5/29/2024 1:20 PM EDT  Workstation ID: [id number]     Results for orders placed during the hospital encounter of 05/05/24    Adult Transesophageal Echo 3D (DAMI[facility]N) W/ Cont If Necessary Per Protocol    Interpretation Summary    Left ventricular systolic function is normal. Left ventricular ejection fraction appears to be 61 - 65%. Normal left ventricular cavity size noted. Left ventricular wall thickness is consistent with mild concentric hypertrophy. All left ventricular wall segments contract normally.    There is mild calcification of the aortic valve. The aortic valve appears trileaflet. Mild aortic valve regurgitation is present. No aortic valve stenosis is present. There is no evidence of an aortic valve mass is present.    There is mild calcification of the mitral valve. There is mild, bileaflet mitral valve thickening present. No  evidence of a mitral valve mass is present. Mild to moderate mitral valve regurgitation is present. No significant mitral valve stenosis is present.    The tricuspid valve is structurally normal with no significant stenosis present. There is no evidence of a mass on the tricuspid valve. Trace tricuspid valve regurgitation is present.    The pulmonic valve is grossly normal in structure. There is trace pulmonic valve regurgitation present.    No vegetation seen on atrial aspect of pacemaker leads.  The most distal aspects of the RV lead were not completely visualized however there was no apparent vegetation in the more proximal segment, adjacent to the tricuspid valve which appears to be functioning normally.      Current medications:  Scheduled Meds:budesonide-formoterol, 2 puff, Inhalation, BID - RT   And  tiotropium bromide monohydrate, 2 puff, Inhalation, Daily - RT  ferrous sulfate, 325 mg, Oral, Daily With Breakfast  fludrocortisone, 50 mcg, Oral, Daily  megestrol, 20 mg, Oral, Daily  pravastatin, 80 mg, Oral, Nightly  sodium bicarbonate, 1,300 mg, Oral, TID  vancomycin, 125 mg, Oral, Q6H      Continuous Infusions:[Held by provider] sodium chloride, 100 mL/hr, Last Rate: Stopped (05/30/24 0933)      PRN Meds:.  acetaminophen **OR** acetaminophen **OR** acetaminophen    albuterol sulfate HFA    melatonin    midodrine    nitroglycerin    ondansetron    sodium chloride    Assessment & Plan   Assessment & Plan     Active Hospital Problems    Diagnosis  POA    **Hx of hypotension [Z86.79]  Not Applicable    Hypotension [I95.9]  Yes    Dehydration [E86.0]  Yes    C. difficile colitis [A04.72]  Yes    UTI (urinary tract infection) [N39.0]  Unknown    Stage 4 chronic kidney disease [N18.4]  Yes    CAD (coronary artery disease) [I25.10]  Yes    ARACELI (acute kidney injury) [N17.9]  Yes    GERD without esophagitis [K21.9]  Yes    Malignant neoplasm of lower lobe of right lung [C34.31]  Yes    Centrilobular emphysema [J43.2]   Yes    Tobacco abuse [Z72.0]  Yes    Mixed hyperlipidemia [E78.2]  Yes      Resolved Hospital Problems   No resolved problems to display.        Brief Hospital Course to date:  David Barfield is a 74 y.o. male with medical history significant for NSCLC status-post neoadjuvant chemo, RLL lobectomy 1/2024, subsequent immunotherapy with Opdivo (last dose around 4/4/2024), CAD, essential hypertension, AV block status post PPM, hypertension, emphysema, tobacco use and ARACELI on CKD 4/5 who presented to the ED with low BP. Patient has had approximately 4-5 admissions since last month. He was recently discharged on 5/23/2024 and was admitted at that time due to hypotension that resolved with IV fluids. Additionally, patient tested positive for C. difficile and was started on oral vancomycin. ID and nephrology following.    This patient's problems and plans were partially entered by my partner and updated as appropriate by me 05/30/24.      Hypotension, fluid responsive  - BP improved after IV fluids  - Recent workup included TTE, appropriate cortisol, normal TSH, ACTH stimulation test was negative  - Encourage oral intake/megace would need to increase to 200-400mg but waiting with his current GFR  - Continue midodrine  -- Aldosterone low at 2.5, low end of normal  -- Dr. Ríos started fludrocortisone 5/26 along with midodrine and IV fluids.  BP much better.   -- Hold IV fluids and monitor BP.      Dyspnea  --CXR 5/29 shows moderate right effusion, increased from prior study  --Hold IV fluids as BP improving with fludocortisone, sats are 98% on RA    ARACELI on CKD stage IV  Metabolic acidosis  Hyperphosphotemia  --Appreciate nephrology input. Question of hemodynamic injury from hypotension vs AIN with immunotherapy. Nephrology considering kidney biopsy this admission.  --renal u/s shows caliectasis of left upper pole kidney.  ?need for  consult, defer to nephrology   --Creatinine 6.88 today, down from 7.48 yesterday.     --Phos 5.9, modify diet to low-phos, corrected Ca wnl  -- AM BMP, mag, phos      C. difficile colitis  - Continue oral vancomycin 125 mg every 6 hours for total of 2 weeks  - ID consulted, Dr. Derian Barry following  - CT abdomen/pelvis without contrast c/w colitis predominantly ascending colon     Leukocytosis  - Trending down overall, pt afebrile  - UA noted  - Continue oral vancomycin  - Per ID hold on further IV antibiotics at this time  -- AM CBC     NSCLC  - Status post neoadjuvant chemo, RLL lobectomy 1/20/2024, immunotherapy (last dose of optivo 4/4/2024)  - Follows with Dr. Ashley, Dr. Ashley consulted and further therapy on hold  -- Per Dr. Ashley, treatment for immunpotherapy induced nephritis is high-dose steroids prednisone 1mg/kg. On fludrocortisone as above.     CAD  AV block status post PPM  Hx hypertension  - Continue statin, hold amlodipine due to hypotension     Chronic anemia  - Monitor CBC.  Stable currently.  Likely component of anemia of chronic disease d/t kidneys  - Continue iron supplement     Emphysema  Tobacco abuse  -Trelegy       Expected Discharge Location and Transportation: Home  Expected Discharge pending BP trend off IV fluids  Expected Discharge Date: 5/31/2024; Expected Discharge Time:      DVT prophylaxis:  Mechanical DVT prophylaxis orders are present.         AM-PAC 6 Clicks Score (PT): 23 (05/30/24 0800)    CODE STATUS:   Code Status and Medical Interventions:   Ordered at: 05/25/24 0618     Level Of Support Discussed With:    Patient     Code Status (Patient has no pulse and is not breathing):    CPR (Attempt to Resuscitate)     Medical Interventions (Patient has pulse or is breathing):    Full Support       Alix Nunes, ANAMIKA  05/30/24

## 2024-05-30 NOTE — PROGRESS NOTES
"   LOS: 3 days    Patient Care Team:  Hayley Castellanos APRN as PCP - General (Nurse Practitioner)  Octaviano Sampson MD as Consulting Physician (Pulmonary Disease)  Neetu Ashley MD as Referring Physician (Hematology and Oncology)  Nimo Rodríguez MD as Consulting Physician (Radiation Oncology)  [place], [place] as Ambulatory  ([facility] [place] [facility] [place]    Chief Complaint:  Low BP     Subjective     No overnight issues. Doing better. Denies chest pain or shortness of breath   Bp significant improved on midodrine and florinef.  IVF stopped today. Encourage oral hydration     Objective     Vital Sign Min/Max for last 24 hours  Temp  Min: 97.8 °F (36.6 °C)  Max: 98.5 °F (36.9 °C)   BP  Min: 131/78  Max: 175/86   Pulse  Min: 70  Max: 86   Resp  Min: 16  Max: 19   SpO2  Min: 94 %  Max: 100 %   No data recorded   No data recorded     Flowsheet Rows      Flowsheet Row First Filed Value   Admission Height 182.9 cm (72\") Documented at 05/25/2024 0315   Admission Weight 78 kg (172 lb) Documented at 05/25/2024 0315            I/O this shift:  In: -   Out: 2000 [Urine:2000]  I/O last 3 completed shifts:  In: -   Out: 7875 [Urine:7875]    Physical Exam:    General Appearance: Alert, oriented, no obvious distress.  Eyes: PER, EOMI.  Neck: Supple no JVD.  Lungs: Clear auscultation, no rales rhonchi's, equal chest movement, nonlabored.  Heart: No gallop, murmur, rub, RRR.  Abdomen: Soft, nontender, positive bowel sounds, no organomegaly.  Extremities: No edema, no cyanosis.  Neuro: No focal deficit, moving all extremities, alert oriented X 3        WBC WBC   Date Value Ref Range Status   05/30/2024 14.04 (H) 3.40 - 10.80 10*3/mm3 Final   05/29/2024 13.68 (H) 3.40 - 10.80 10*3/mm3 Final   05/28/2024 16.03 (H) 3.40 - 10.80 10*3/mm3 Final      HGB Hemoglobin   Date Value Ref Range Status   05/30/2024 8.1 (L) 13.0 - 17.7 g/dL Final   05/29/2024 7.8 (L) 13.0 - 17.7 g/dL Final   05/28/2024 7.8 (L) 13.0 - 17.7 g/dL Final    " "  HCT Hematocrit   Date Value Ref Range Status   05/30/2024 25.1 (L) 37.5 - 51.0 % Final   05/29/2024 23.5 (L) 37.5 - 51.0 % Final   05/28/2024 23.8 (L) 37.5 - 51.0 % Final      Platlets No results found for: \"LABPLAT\"   MCV MCV   Date Value Ref Range Status   05/30/2024 88.7 79.0 - 97.0 fL Final   05/29/2024 89.0 79.0 - 97.0 fL Final   05/28/2024 89.8 79.0 - 97.0 fL Final          Sodium Sodium   Date Value Ref Range Status   05/30/2024 139 136 - 145 mmol/L Final   05/29/2024 138 136 - 145 mmol/L Final   05/28/2024 130 (L) 136 - 145 mmol/L Final      Potassium Potassium   Date Value Ref Range Status   05/30/2024 3.8 3.5 - 5.2 mmol/L Final   05/29/2024 4.1 3.5 - 5.2 mmol/L Final   05/28/2024 4.0 3.5 - 5.2 mmol/L Final      Chloride Chloride   Date Value Ref Range Status   05/30/2024 103 98 - 107 mmol/L Final   05/29/2024 102 98 - 107 mmol/L Final   05/28/2024 96 (L) 98 - 107 mmol/L Final      CO2 CO2   Date Value Ref Range Status   05/30/2024 21.0 (L) 22.0 - 29.0 mmol/L Final   05/29/2024 19.0 (L) 22.0 - 29.0 mmol/L Final   05/28/2024 19.0 (L) 22.0 - 29.0 mmol/L Final      BUN BUN   Date Value Ref Range Status   05/30/2024 44 (H) 8 - 23 mg/dL Final   05/29/2024 53 (H) 8 - 23 mg/dL Final   05/28/2024 51 (H) 8 - 23 mg/dL Final      Creatinine Creatinine   Date Value Ref Range Status   05/30/2024 6.88 (H) 0.76 - 1.27 mg/dL Final   05/29/2024 7.48 (H) 0.76 - 1.27 mg/dL Final   05/28/2024 7.90 (H) 0.76 - 1.27 mg/dL Final      Calcium Calcium   Date Value Ref Range Status   05/30/2024 7.5 (L) 8.6 - 10.5 mg/dL Final   05/29/2024 7.4 (L) 8.6 - 10.5 mg/dL Final   05/28/2024 7.9 (L) 8.6 - 10.5 mg/dL Final      PO4 No results found for: \"CAPO4\"   Albumin Albumin   Date Value Ref Range Status   05/30/2024 2.9 (L) 3.5 - 5.2 g/dL Final   05/29/2024 2.5 (L) 3.5 - 5.2 g/dL Final   05/28/2024 2.5 (L) 3.5 - 5.2 g/dL Final      Magnesium No results found for: \"MG\"   Uric Acid No results found for: \"URICACID\"          Results Review:  "    I reviewed the patient's new clinical results.    budesonide-formoterol, 2 puff, Inhalation, BID - RT   And  tiotropium bromide monohydrate, 2 puff, Inhalation, Daily - RT  ferrous sulfate, 325 mg, Oral, Daily With Breakfast  fludrocortisone, 50 mcg, Oral, Daily  megestrol, 20 mg, Oral, Daily  pravastatin, 80 mg, Oral, Nightly  sodium bicarbonate, 1,300 mg, Oral, TID  vancomycin, 125 mg, Oral, Q6H      [Held by provider] sodium chloride, 100 mL/hr, Last Rate: Stopped (05/30/24 0933)        Medication Review: yes    Results from last 7 days   Lab Units 05/30/24  0522 05/29/24  0633 05/28/24  0839 05/27/24  0628 05/26/24  0509   SODIUM mmol/L 139 138 130* 132* 133*   POTASSIUM mmol/L 3.8 4.1 4.0 4.2 4.1   CHLORIDE mmol/L 103 102 96* 99 100   CO2 mmol/L 21.0* 19.0* 19.0* 18.0* 21.0*   BUN mg/dL 44* 53* 51* 48* 40*   CREATININE mg/dL 6.88* 7.48* 7.90* 6.94* 5.94*   CALCIUM mg/dL 7.5* 7.4* 7.9* 7.6* 8.0*   ALBUMIN g/dL 2.9* 2.5* 2.5*  --  2.4*   WBC 10*3/mm3 14.04* 13.68* 16.03* 21.90* 23.95*   HEMOGLOBIN g/dL 8.1* 7.8* 7.8* 8.0* 8.4*   PLATELETS 10*3/mm3 257 234 233 235 227           Assessment & Plan       Hx of hypotension    Mixed hyperlipidemia    Centrilobular emphysema    Tobacco abuse    Malignant neoplasm of lower lobe of right lung    GERD without esophagitis    ARACELI (acute kidney injury)    CAD (coronary artery disease)    Stage 4 chronic kidney disease    Hypotension    Dehydration    C. difficile colitis    UTI (urinary tract infection)    1.  ARACELI on CKD stage IV -7.9 creatinine peaked around 7.6 to last admission, patient went home with a creatinine slowly coming down to 5 range.  Serologic workup -ANCA (-), Unclear etiology - DDx- AIN -y vs pre-renal/atn from hypotension.   2.  Proteinuria   3.  Hypotension - Cortisol level - 15.44 and ACTH pending. level 2 weeks back was 26.33 - Adrenal insufficiency reported with Opdivo in literature along with other endocrine abnormalities. Per wife, he has salt  craving and weakness even before Opdivo. ACTH stimulation test result normal, DHEA- normal, Renin activity normal. Aldosterone low 2.3  4.  Hyponatremia    5. Anemia   6.  Metabolic acidosis.   7.  Infectious disease: Patient's followed with ID consultants.  8.  S/p immunotherapy for non-small cell cancer last infusion 4/4/2024 Opdivo.  9- 10- C diff colitis - on vancomycin.      Recommendation:  ARACELI Etiology unspecified Hemodynamic injury due to recurrent hypotension vs AIN in the setting of immunotherapy. Cr slightly better today. If no improvement in renal function will consider biopsy.   . Continue with current management plan for now.     High risk complex patient multiple medical problems.    Jose Singh MD  05/30/24  15:37 EDT

## 2024-05-30 NOTE — PROGRESS NOTES
INFECTIOUS DISEASE f/u     David Barfield  1950  [id number]    Date of Consult: 5/25/2024    Admission Date: 5/25/2024      Requesting Provider: ANAMIKA Sr  Evaluating Physician: David Mistry MD    Reason for Consultation: Cdiff    History of present illness:    Patient is a 74 y.o. male , known to Dr. Derian Barry, with h/o COPD, NSCLC right lung/chemo/RLL lobectomy 1/2024/on Opdivo with last dose around 4/4/2024, T2DM, CAD, AV block/ppm, HTN, HLD, tobacco use, and CKD Stage V/not on dialysis who presented to [facility] ED on 5/25 with hypotension.  He has had multiple admissions over the last month. He had severe leukocytosis and hospitalized from 4/24 to 4/29.  He was started on Cefepime.  A Karius test from 4/26 was negative. He also had persistent diarrhea; however, his Cdiff and GI panel PCR tests were negative from 4/25.  He was admitted from 5/6-5/12 for bilateral pyelonephritis and continued on IV Cefepime with end date tentative for 6/3.  He was admitted to [facility] from 5/20-5/23 for acute renal failure and found to have Cdiff positive PCR and Antigen test.  Cefepime was stopped and he was discharged on oral Vancomycin for 2 weeks.      He was readmitted to [facility] on 5/25 for increased weakness along with nausea, vomiting, and decreased appetite.  He had hypotension at home that did not improve with midodrine.  He was brought to ED by EMS.  He denies fever or chills or worsening diarrhea.  He states that he has had no diarrhea since a couple of days ago.  He has had some dysuria and increased urinary frequency.  He is afebrile.  Pertinent labs were lactic acid 2.3, WBC 31,000 with 83% segs/9% bands, CPK 9, and creatinine 3.27 (baseline 2.03 to 2.4 and last creatinine on 3/22 was 3.86 after acute renal failure episode).  Blood cultures are pending.  A UA WBC was 21-50 with moderate LE and negative nitrite.  No imaging studies have been done on this visit.  He is currently on oral Vancomycin.   ID was asked to evaluate and manage his antibiotic therapy.     5/25/24; Covering for Dr. TAMARA Barry; diarrhea better; having semiformed bowel movements; afebrile, awake, alert, following commands.  Tolerating oral vancomycin    5/28/24: Patient is feeling somewhat better.  Diarrhea has resolved.  He has not had a bowel movement today.  He is not having any fevers.  Leukocytosis is improving.  Creatinine is worse but he states that he is having significant urine output.  No nausea or vomiting.    5/29/24: The patient is eating better.  He is feeling better overall.  Creatinine is slightly better today at 7.48.  White blood cell count is improving to 13.68 today.  He is not having any fevers.  He denies any abdominal pain or flank pain.  Nephrology is considering renal biopsy soon.    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    Coronary artery disease 2019    Diabetes mellitus Borderline    Emphysema/COPD     Erectile disorder     GERD (gastroesophageal reflux disease)     History of chemotherapy     Hyperlipidemia     Hypertension 2019    Lung nodule     Mumps     Mumps     Pruritus     after bath    Slow to wake up after anesthesia     Stage 5 chronic kidney disease not on chronic dialysis 5/14/2024    Wears dentures     upper only    Wears hearing aid in both ears     usually only wears right       Past Surgical History:   Procedure Laterality Date    BONE BIOPSY      broken bone surgery in his face    BRONCHOSCOPY THORACOTOMY Right 01/09/2024    Procedure: THORACOTOMY FOR LOWER LOBECTOMY AND MEDISTINAL LYMPH NODE DISSECTION RIGHT;  Surgeon: Joey Patel MD;  Location: [facility];  Service: Cardiothoracic;  Laterality: Right;    BRONCHOSCOPY WITH ION ROBOTIC ASSIST N/A 09/15/2023    Procedure: BRONCHOSCOPY NAVIGATION WITH ENDOBRONCHIAL ULTRASOUND AND ION ROBOT;  Surgeon: Octaviano Sampson MD;  " Location:  [facility];  Service: Robotics - Pulmonary;  Laterality: N/A;  ion #6 - 0032  - 0015  Cath guide 0061    EBUS balloon removed and intact    CARDIAC ELECTROPHYSIOLOGY PROCEDURE N/A 08/17/2021    Procedure: Pacemaker DC new;  Surgeon: Kayy Box MD;  Location:  [facility];  Service: Cardiology;  Laterality: N/A;    FACIAL FRACTURE SURGERY      LYMPH NODE BIOPSY  2023    PACEMAKER IMPLANTATION         Family History   Problem Relation Age of Onset    Aneurysm Mother         brain    Dementia Father     Leukemia Sister     Heart disease Paternal Grandmother     Hypertension Paternal Grandfather     Cancer Sister        Social History     Socioeconomic History    Marital status: Single    Number of children: 3   Tobacco Use    Smoking status: Every Day     Current packs/day: 0.50     Average packs/day: 0.5 packs/day for 56.4 years (28.7 ttl pk-yrs)     Types: Cigarettes     Start date: 1/1/1968    Smokeless tobacco: Never    Tobacco comments:     Still smoke   Vaping Use    Vaping status: Never Used   Substance and Sexual Activity    Alcohol use: Never    Drug use: Never    Sexual activity: Yes     Partners: Female     Birth control/protection: None       Allergies   Allergen Reactions    Cymbalta [Duloxetine Hcl] GI Intolerance    Gabapentin Mental Status Change     Pt states that this medication \"makes him feel foolish in his head\".     Remeron [Mirtazapine] Other (See Comments)     Excess sedation    Toradol [Ketorolac Tromethamine] GI Intolerance     Projectile vomiting     Latex Other (See Comments)     Latex allergy     Tape Rash         Medication:    Current Facility-Administered Medications:     acetaminophen (TYLENOL) tablet 650 mg, 650 mg, Oral, Q4H PRN **OR** acetaminophen (TYLENOL) 160 MG/5ML oral solution 650 mg, 650 mg, Oral, Q4H PRN **OR** acetaminophen (TYLENOL) suppository 650 mg, 650 mg, Rectal, Q4H PRN, Bryson, Marisabel, APRN    albuterol sulfate HFA " (PROVENTIL HFA;VENTOLIN HFA;PROAIR HFA) inhaler 2 puff, 2 puff, Inhalation, Q4H PRN, Bryson, Marisabel, APRN    budesonide-formoterol (SYMBICORT) 160-4.5 MCG/ACT inhaler 2 puff, 2 puff, Inhalation, BID - RT, 2 puff at 05/29/24 1954 **AND** tiotropium (SPIRIVA RESPIMAT) 2.5 mcg/act aerosol solution inhaler, 2 puff, Inhalation, Daily - RT, Bryson, Marisabel, APRN, 2 puff at 05/29/24 0949    ferrous sulfate tablet 325 mg, 325 mg, Oral, Daily With Breakfast, Bryson, Marisabel, APRN, 325 mg at 05/29/24 0812    fludrocortisone tablet 50 mcg, 50 mcg, Oral, Daily, [place]ano, Kurt Martinez MD, 50 mcg at 05/29/24 0812    megestrol (MEGACE) tablet 20 mg, 20 mg, Oral, Daily, Buddy Lawrence MD, 20 mg at 05/29/24 0812    melatonin tablet 5 mg, 5 mg, Oral, Nightly PRN, Dana Silverman PA-C, 5 mg at 05/29/24 0028    midodrine (PROAMATINE) tablet 10 mg, 10 mg, Oral, TID PRN, Bryson, Marisabel, APRN, 10 mg at 05/26/24 1145    nitroglycerin (NITROSTAT) SL tablet 0.4 mg, 0.4 mg, Sublingual, Q5 Min PRN, Bryson, Marisabel, APRN    ondansetron (ZOFRAN) injection 4 mg, 4 mg, Intravenous, Q6H PRN, Dana Silverman PA-C, 4 mg at 05/27/24 0044    pravastatin (PRAVACHOL) tablet 80 mg, 80 mg, Oral, Nightly, Bryson, Marisabel, APRN, 80 mg at 05/28/24 2028    sodium bicarbonate tablet 1,300 mg, 1,300 mg, Oral, TID, Bryson, Marisabel, APRN, 1,300 mg at 05/29/24 1730    sodium chloride 0.9 % infusion, 100 mL/hr, Intravenous, Continuous, [place], Kurt Martinez MD, Last Rate: 100 mL/hr at 05/28/24 1635, 100 mL/hr at 05/28/24 1635    sodium chloride nasal spray 2 spray, 2 spray, Each Nare, PRN, Buddy Lawrence MD    vancomycin (VANCOCIN) capsule 125 mg, 125 mg, Oral, Q6H, Marisabel Massey APRN, 125 mg at 05/29/24 1730    Antibiotics:  Anti-Infectives (From admission, onward)      Ordered     Dose/Rate Route Frequency Start Stop    05/25/24 0908  vancomycin (VANCOCIN) capsule 125 mg        Ordering Provider: Marisabel Massey APRN    125 mg  Oral Every 6 Hours Scheduled 24 1200 24 1159              Review of Systems:  See hpi      Physical Exam:   Vital Signs  Temp (24hrs), Av.7 °F (36.5 °C), Min:97.4 °F (36.3 °C), Max:98.3 °F (36.8 °C)    Temp  Min: 97.4 °F (36.3 °C)  Max: 98.3 °F (36.8 °C)  BP  Min: 152/91  Max: 175/85  Pulse  Min: 70  Max: 78  Resp  Min: 16  Max: 18  SpO2  Min: 96 %  Max: 100 %    GENERAL: Awake and alert, in no acute distress. Sitting up in bed  HEENT: Normocephalic, atraumatic. No external oral lesions noted  HEART: RRR, no murmur  LUNGS: CTA B. Nonlabored breathing on room air.  Not coughing.  ABDOMEN: Soft, nontender, nondistended. No HSM palpated  EXT:  No peripheral edema.  No cellulitic change noted  :  Without Yeung catheter.  MSK: No joint effusions or erythema  SKIN: New rashes or jaundice  NEURO: Oriented to PPT.  Normal speech and cognition  PSYCHIATRIC: Normal insight and judgment. Cooperative with PE    Laboratory Data    Results from last 7 days   Lab Units 24  0633 24  0839 24  0628   WBC 10*3/mm3 13.68* 16.03* 21.90*   HEMOGLOBIN g/dL 7.8* 7.8* 8.0*   HEMATOCRIT % 23.5* 23.8* 24.8*   PLATELETS 10*3/mm3 234 233 235     Results from last 7 days   Lab Units 24  0633   SODIUM mmol/L 138   POTASSIUM mmol/L 4.1   CHLORIDE mmol/L 102   CO2 mmol/L 19.0*   BUN mg/dL 53*   CREATININE mg/dL 7.48*   GLUCOSE mg/dL 92   CALCIUM mg/dL 7.4*     Results from last 7 days   Lab Units 24  0343   ALK PHOS U/L 108   BILIRUBIN mg/dL 0.3   ALT (SGPT) U/L 22   AST (SGOT) U/L 25             Results from last 7 days   Lab Units 24  0718   LACTATE mmol/L 2.1*     Results from last 7 days   Lab Units 24  0857 24  0343   CK TOTAL U/L 10* 9*     Results from last 7 days   Lab Units 24  0857   VANCOMYCIN TR mcg/mL <4.00*     Estimated Creatinine Clearance: 10.1 mL/min (A) (by C-G formula based on SCr of 7.48 mg/dL (H)).      Microbiology:  Blood cultures pending.   Urine  culture pending            Radiology:  Imaging Results (Last 72 Hours)       Procedure Component Value Units Date/Time    XR Chest 1 View [[id number]] Collected: 05/29/24 1319     Updated: 05/29/24 1324    Narrative:      XR CHEST 1 VW    Date of Exam: 5/29/2024 12:00 PM EDT    Indication: dyspnea    Comparison: 5/19/2024  Findings:  Right-sided pacemaker and left chest port again noted. Heart size is within normal limits. There is a moderate sized right pleural effusion, increased. There is a probable small left pleural effusion, new. There is increasing mild atelectasis of the lung   bases. There are chronic emphysematous changes of the lung fields.      Impression:      Impression:  Moderate sized right effusion, increased from prior study. Mild bibasilar atelectasis. Probable small left effusion.      Electronically Signed: Elsa Schmid MD    5/29/2024 1:20 PM EDT    Workstation ID: [id number]    US Renal Bilateral [[id number]] Collected: 05/28/24 1236     Updated: 05/28/24 1241    Narrative:      US RENAL BILATERAL    Date of Exam: 5/28/2024 11:12 AM EDT    Indication: ARACELI.    Comparison: No comparisons available.    Technique: Grayscale and color Doppler ultrasound evaluation of the kidneys and urinary bladder was performed.      Findings:  Kidneys are normal in size and shape bilaterally. Note is made of a right pleural effusion. There is no right hydronephrosis. There is caliectasis of the left upper pole kidney. There is normal blood flow to both kidneys. There are no renal masses.   Ureteral jets appear normal. There is moderate prostate enlargement. There are prostate calcifications.      Impression:      Impression:  Caliectasis of the left upper pole kidney. Right pleural effusion. Enlarged prostate gland with calcifications.        Electronically Signed: Elsa Schmid MD    5/28/2024 12:38 PM EDT    Workstation ID: [id number]              Impression:   - C. Difficile colitis-diarrhea recently but  improved today  - Recent bilateral pyelonephritis/treated with Cefepime  - Marked leukocytosis/neutrophilia-Improving  - Lactic acidosis-Improved  - Hypotension/responding to fluid resuscitation-ACTH stimulation test result was normal.  DHEA was normal.  Renin Cristhian level pending.  - Acute on chronic renal failure Stage IV-Not currently on dialysis. Intermittent hypotension likely contributing.  Unclear if another etiology could be contributing as well.  Extensive workup thus far has been negative.  No urine eosinophils. Improving slightly today.  Nephrology considering renal biopsy soon  - Non small cell lung cancer s/p RLL lobectomy/s/p chemotherapy  - Chronic obstructive pulmonary disease with ongoing tobacco abuse  - Type 2 diabetes mellitus  - AV block/ppm  - Essential hypertension      PLAN/RECOMMENDATIONS:   Thank you for asking us to see David Barfield, I recommend the following:    -Continue to monitor CBC and BMP closely  -Blood cultures remain no growth to date  -Continue vancomycin 125 mg by mouth every 6 hours.   -Nephrology is following.  Nephrology considering renal biopsy procedure soon    I discussed in length with the patient and his girlfriend at bedside today      Derian Barry MD  5/29/2024  20:15 EDT

## 2024-05-30 NOTE — PLAN OF CARE
Goal Outcome Evaluation:         Patient alert and orient x4. Very pleasant. Ambulating in room without difficulty. Pt has a very good appetite today.  BP maintaining 130s this afternoon. IV fluids on hold by provider. Diet changed to incorporate salt intake per nephrology.

## 2024-05-31 ENCOUNTER — READMISSION MANAGEMENT (OUTPATIENT)
Dept: CALL CENTER | Facility: HOSPITAL | Age: 74
End: 2024-05-31
Payer: MEDICARE

## 2024-05-31 VITALS
HEART RATE: 71 BPM | WEIGHT: 185.19 LBS | SYSTOLIC BLOOD PRESSURE: 134 MMHG | HEIGHT: 72 IN | OXYGEN SATURATION: 99 % | TEMPERATURE: 98.4 F | RESPIRATION RATE: 18 BRPM | BODY MASS INDEX: 25.08 KG/M2 | DIASTOLIC BLOOD PRESSURE: 78 MMHG

## 2024-05-31 LAB
ANION GAP SERPL CALCULATED.3IONS-SCNC: 15 MMOL/L (ref 5–15)
BKV DNA # UR NAA+PROBE: 3710 IU/ML
BKV DNA UR NAA+PROBE-LOG#: 3.57 LOG10 IU/ML
BUN SERPL-MCNC: 40 MG/DL (ref 8–23)
BUN/CREAT SERPL: 6.6 (ref 7–25)
CA-I SERPL ISE-MCNC: 1.05 MMOL/L (ref 1.12–1.32)
CALCIUM SPEC-SCNC: 7.3 MG/DL (ref 8.6–10.5)
CHLORIDE SERPL-SCNC: 105 MMOL/L (ref 98–107)
CO2 SERPL-SCNC: 21 MMOL/L (ref 22–29)
CREAT SERPL-MCNC: 6.05 MG/DL (ref 0.76–1.27)
DEPRECATED RDW RBC AUTO: 54 FL (ref 37–54)
EGFRCR SERPLBLD CKD-EPI 2021: 9.1 ML/MIN/1.73
ERYTHROCYTE [DISTWIDTH] IN BLOOD BY AUTOMATED COUNT: 16.7 % (ref 12.3–15.4)
GLUCOSE SERPL-MCNC: 100 MG/DL (ref 65–99)
HCT VFR BLD AUTO: 24.6 % (ref 37.5–51)
HGB BLD-MCNC: 8 G/DL (ref 13–17.7)
MAGNESIUM SERPL-MCNC: 1.1 MG/DL (ref 1.6–2.4)
MCH RBC QN AUTO: 29 PG (ref 26.6–33)
MCHC RBC AUTO-ENTMCNC: 32.5 G/DL (ref 31.5–35.7)
MCV RBC AUTO: 89.1 FL (ref 79–97)
PHOSPHATE SERPL-MCNC: 5.7 MG/DL (ref 2.5–4.5)
PLATELET # BLD AUTO: 272 10*3/MM3 (ref 140–450)
PMV BLD AUTO: 8.3 FL (ref 6–12)
POTASSIUM SERPL-SCNC: 3.7 MMOL/L (ref 3.5–5.2)
RBC # BLD AUTO: 2.76 10*6/MM3 (ref 4.14–5.8)
SODIUM SERPL-SCNC: 141 MMOL/L (ref 136–145)
WBC NRBC COR # BLD AUTO: 13.17 10*3/MM3 (ref 3.4–10.8)

## 2024-05-31 PROCEDURE — 80048 BASIC METABOLIC PNL TOTAL CA: CPT | Performed by: NURSE PRACTITIONER

## 2024-05-31 PROCEDURE — 25010000002 MAGNESIUM SULFATE 2 GM/50ML SOLUTION: Performed by: INTERNAL MEDICINE

## 2024-05-31 PROCEDURE — 94761 N-INVAS EAR/PLS OXIMETRY MLT: CPT

## 2024-05-31 PROCEDURE — 82330 ASSAY OF CALCIUM: CPT | Performed by: NURSE PRACTITIONER

## 2024-05-31 PROCEDURE — 85027 COMPLETE CBC AUTOMATED: CPT | Performed by: NURSE PRACTITIONER

## 2024-05-31 PROCEDURE — 94664 DEMO&/EVAL PT USE INHALER: CPT

## 2024-05-31 PROCEDURE — 84100 ASSAY OF PHOSPHORUS: CPT | Performed by: NURSE PRACTITIONER

## 2024-05-31 PROCEDURE — 99239 HOSP IP/OBS DSCHRG MGMT >30: CPT | Performed by: NURSE PRACTITIONER

## 2024-05-31 PROCEDURE — 94799 UNLISTED PULMONARY SVC/PX: CPT

## 2024-05-31 PROCEDURE — 83735 ASSAY OF MAGNESIUM: CPT | Performed by: NURSE PRACTITIONER

## 2024-05-31 RX ORDER — FLUDROCORTISONE ACETATE 0.1 MG/1
0.05 TABLET ORAL DAILY
Qty: 15 TABLET | Refills: 0 | Status: SHIPPED | OUTPATIENT
Start: 2024-06-01 | End: 2024-07-01

## 2024-05-31 RX ORDER — VANCOMYCIN HYDROCHLORIDE 125 MG/1
CAPSULE ORAL
Qty: 49 CAPSULE | Refills: 0 | Status: SHIPPED | OUTPATIENT
Start: 2024-05-31 | End: 2024-06-21

## 2024-05-31 RX ORDER — MEGESTROL ACETATE 40 MG/1
20 TABLET ORAL DAILY
Qty: 15 TABLET | Refills: 0 | Status: SHIPPED | OUTPATIENT
Start: 2024-05-31 | End: 2024-06-30

## 2024-05-31 RX ORDER — VANCOMYCIN HYDROCHLORIDE 125 MG/1
CAPSULE ORAL
Qty: 49 CAPSULE | Refills: 0 | Status: SHIPPED | OUTPATIENT
Start: 2024-05-31 | End: 2024-05-31

## 2024-05-31 RX ORDER — MAGNESIUM SULFATE HEPTAHYDRATE 40 MG/ML
2 INJECTION, SOLUTION INTRAVENOUS
Status: COMPLETED | OUTPATIENT
Start: 2024-05-31 | End: 2024-05-31

## 2024-05-31 RX ADMIN — VANCOMYCIN HYDROCHLORIDE 125 MG: 125 CAPSULE ORAL at 11:11

## 2024-05-31 RX ADMIN — VANCOMYCIN HYDROCHLORIDE 125 MG: 125 CAPSULE ORAL at 06:30

## 2024-05-31 RX ADMIN — TIOTROPIUM BROMIDE INHALATION SPRAY 2 PUFF: 3.12 SPRAY, METERED RESPIRATORY (INHALATION) at 08:55

## 2024-05-31 RX ADMIN — SODIUM BICARBONATE 650 MG TABLET 1300 MG: at 16:16

## 2024-05-31 RX ADMIN — FERROUS SULFATE TAB 325 MG (65 MG ELEMENTAL FE) 325 MG: 325 (65 FE) TAB at 08:11

## 2024-05-31 RX ADMIN — MAGNESIUM SULFATE HEPTAHYDRATE 2 G: 2 INJECTION, SOLUTION INTRAVENOUS at 11:11

## 2024-05-31 RX ADMIN — MEGESTROL ACETATE 20 MG: 20 TABLET ORAL at 08:11

## 2024-05-31 RX ADMIN — FLUDROCORTISONE ACETATE 50 MCG: 0.1 TABLET ORAL at 08:11

## 2024-05-31 RX ADMIN — MAGNESIUM SULFATE HEPTAHYDRATE 2 G: 2 INJECTION, SOLUTION INTRAVENOUS at 15:02

## 2024-05-31 RX ADMIN — SODIUM BICARBONATE 650 MG TABLET 1300 MG: at 08:11

## 2024-05-31 RX ADMIN — BUDESONIDE AND FORMOTEROL FUMARATE DIHYDRATE 2 PUFF: 160; 4.5 AEROSOL RESPIRATORY (INHALATION) at 08:55

## 2024-05-31 RX ADMIN — VANCOMYCIN HYDROCHLORIDE 125 MG: 125 CAPSULE ORAL at 00:26

## 2024-05-31 NOTE — PLAN OF CARE
Goal Outcome Evaluation:         Patient alert and oriented x4. Discharged home via girlfriend. Replaced magnesium today.

## 2024-05-31 NOTE — PROGRESS NOTES
INFECTIOUS DISEASE f/u     David Barfield  1950  [id number]    Date of Consult: 5/25/2024    Admission Date: 5/25/2024      Requesting Provider: ANAMIKA Sr  Evaluating Physician: David Mistry MD    Reason for Consultation: Cdiff    History of present illness:    Patient is a 74 y.o. male , known to Dr. Derian Barry, with h/o COPD, NSCLC right lung/chemo/RLL lobectomy 1/2024/on Opdivo with last dose around 4/4/2024, T2DM, CAD, AV block/ppm, HTN, HLD, tobacco use, and CKD Stage V/not on dialysis who presented to [facility] ED on 5/25 with hypotension.  He has had multiple admissions over the last month. He had severe leukocytosis and hospitalized from 4/24 to 4/29.  He was started on Cefepime.  A Karius test from 4/26 was negative. He also had persistent diarrhea; however, his Cdiff and GI panel PCR tests were negative from 4/25.  He was admitted from 5/6-5/12 for bilateral pyelonephritis and continued on IV Cefepime with end date tentative for 6/3.  He was admitted to [facility] from 5/20-5/23 for acute renal failure and found to have Cdiff positive PCR and Antigen test.  Cefepime was stopped and he was discharged on oral Vancomycin for 2 weeks.      He was readmitted to [facility] on 5/25 for increased weakness along with nausea, vomiting, and decreased appetite.  He had hypotension at home that did not improve with midodrine.  He was brought to ED by EMS.  He denies fever or chills or worsening diarrhea.  He states that he has had no diarrhea since a couple of days ago.  He has had some dysuria and increased urinary frequency.  He is afebrile.  Pertinent labs were lactic acid 2.3, WBC 31,000 with 83% segs/9% bands, CPK 9, and creatinine 3.27 (baseline 2.03 to 2.4 and last creatinine on 3/22 was 3.86 after acute renal failure episode).  Blood cultures are pending.  A UA WBC was 21-50 with moderate LE and negative nitrite.  No imaging studies have been done on this visit.  He is currently on oral Vancomycin.   ID was asked to evaluate and manage his antibiotic therapy.     5/25/24; Covering for Dr. TAMARA Barry; diarrhea better; having semiformed bowel movements; afebrile, awake, alert, following commands.  Tolerating oral vancomycin    5/28/24: Patient is feeling somewhat better.  Diarrhea has resolved.  He has not had a bowel movement today.  He is not having any fevers.  Leukocytosis is improving.  Creatinine is worse but he states that he is having significant urine output.  No nausea or vomiting.    5/29/24: The patient is eating better.  He is feeling better overall.  Creatinine is slightly better today at 7.48.  White blood cell count is improving to 13.68 today.  He is not having any fevers.  He denies any abdominal pain or flank pain.  Nephrology is considering renal biopsy soon.    5/30/24: The patient is feeling better.  He is eating well.  Still with significant urine output.  Creatinine has trended down to 6.88.  White blood cell count is 14.04.  No diarrhea today.  No nausea or vomiting.  No flank pain or abdominal pain.    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    Coronary artery disease 2019    Diabetes mellitus Borderline    Emphysema/COPD     Erectile disorder     GERD (gastroesophageal reflux disease)     History of chemotherapy     Hyperlipidemia     Hypertension 2019    Lung nodule     Mumps     Mumps     Pruritus     after bath    Slow to wake up after anesthesia     Stage 5 chronic kidney disease not on chronic dialysis 5/14/2024    Wears dentures     upper only    Wears hearing aid in both ears     usually only wears right       Past Surgical History:   Procedure Laterality Date    BONE BIOPSY      broken bone surgery in his face    BRONCHOSCOPY THORACOTOMY Right 01/09/2024    Procedure: THORACOTOMY FOR LOWER LOBECTOMY AND MEDISTINAL LYMPH NODE DISSECTION RIGHT;  Surgeon: Jorge  "Joey POWELL MD;  Location:  [facility];  Service: Cardiothoracic;  Laterality: Right;    BRONCHOSCOPY WITH ION ROBOTIC ASSIST N/A 09/15/2023    Procedure: BRONCHOSCOPY NAVIGATION WITH ENDOBRONCHIAL ULTRASOUND AND ION ROBOT;  Surgeon: Octaviano Sampson MD;  Location:  [facility];  Service: Robotics - Pulmonary;  Laterality: N/A;  ion #6 - 0032  - 0015  Cath guide 0061    EBUS balloon removed and intact    CARDIAC ELECTROPHYSIOLOGY PROCEDURE N/A 08/17/2021    Procedure: Pacemaker DC new;  Surgeon: Kayy Box MD;  Location:  [facility];  Service: Cardiology;  Laterality: N/A;    FACIAL FRACTURE SURGERY      LYMPH NODE BIOPSY  2023    PACEMAKER IMPLANTATION         Family History   Problem Relation Age of Onset    Aneurysm Mother         brain    Dementia Father     Leukemia Sister     Heart disease Paternal Grandmother     Hypertension Paternal Grandfather     Cancer Sister        Social History     Socioeconomic History    Marital status: Single    Number of children: 3   Tobacco Use    Smoking status: Every Day     Current packs/day: 0.50     Average packs/day: 0.5 packs/day for 56.4 years (28.7 ttl pk-yrs)     Types: Cigarettes     Start date: 1/1/1968    Smokeless tobacco: Never    Tobacco comments:     Still smoke   Vaping Use    Vaping status: Never Used   Substance and Sexual Activity    Alcohol use: Never    Drug use: Never    Sexual activity: Yes     Partners: Female     Birth control/protection: None       Allergies   Allergen Reactions    Cymbalta [Duloxetine Hcl] GI Intolerance    Gabapentin Mental Status Change     Pt states that this medication \"makes him feel foolish in his head\".     Remeron [Mirtazapine] Other (See Comments)     Excess sedation    Toradol [Ketorolac Tromethamine] GI Intolerance     Projectile vomiting     Latex Other (See Comments)     Latex allergy     Tape Rash         Medication:    Current Facility-Administered Medications:     acetaminophen " (TYLENOL) tablet 650 mg, 650 mg, Oral, Q4H PRN **OR** acetaminophen (TYLENOL) 160 MG/5ML oral solution 650 mg, 650 mg, Oral, Q4H PRN **OR** acetaminophen (TYLENOL) suppository 650 mg, 650 mg, Rectal, Q4H PRN, Bryson, Marisabel, APRN    albuterol sulfate HFA (PROVENTIL HFA;VENTOLIN HFA;PROAIR HFA) inhaler 2 puff, 2 puff, Inhalation, Q4H PRN, Bryson, Marisabel, APRN    budesonide-formoterol (SYMBICORT) 160-4.5 MCG/ACT inhaler 2 puff, 2 puff, Inhalation, BID - RT, 2 puff at 05/30/24 2004 **AND** tiotropium (SPIRIVA RESPIMAT) 2.5 mcg/act aerosol solution inhaler, 2 puff, Inhalation, Daily - RT, Bryson, Marisabel, APRN, 2 puff at 05/29/24 0949    ferrous sulfate tablet 325 mg, 325 mg, Oral, Daily With Breakfast, Bryson, Marisabel, APRN, 325 mg at 05/30/24 0909    fludrocortisone tablet 50 mcg, 50 mcg, Oral, Daily, [place]ano, Kurt Martinez MD, 50 mcg at 05/30/24 0909    megestrol (MEGACE) tablet 20 mg, 20 mg, Oral, Daily, Buddy Lawrence MD, 20 mg at 05/30/24 0909    melatonin tablet 5 mg, 5 mg, Oral, Nightly PRN, Dana Silverman PA-C, 5 mg at 05/29/24 2149    midodrine (PROAMATINE) tablet 10 mg, 10 mg, Oral, TID PRN, Bryson, Marisabel, APRN, 10 mg at 05/26/24 1145    nitroglycerin (NITROSTAT) SL tablet 0.4 mg, 0.4 mg, Sublingual, Q5 Min PRN, Bryson, Marisabel, APRN    ondansetron (ZOFRAN) injection 4 mg, 4 mg, Intravenous, Q6H PRN, Dana Silverman PA-C, 4 mg at 05/27/24 0044    pravastatin (PRAVACHOL) tablet 80 mg, 80 mg, Oral, Nightly, Bryson, Marisabel, APRN, 80 mg at 05/29/24 2149    sodium bicarbonate tablet 1,300 mg, 1,300 mg, Oral, TID, Bryson, Marisabel, APRN, 1,300 mg at 05/30/24 1610    [Held by provider] sodium chloride 0.9 % infusion, 100 mL/hr, Intravenous, Continuous, [place], Kurt Martinez MD, Stopped at 05/30/24 0933    sodium chloride nasal spray 2 spray, 2 spray, Each Nare, PRN, Buddy Lawrence MD    vancomycin (VANCOCIN) capsule 125 mg, 125 mg, Oral, Q6H, Bryson, Marisabel, APRN, 125 mg at  24 1718    Antibiotics:  Anti-Infectives (From admission, onward)      Ordered     Dose/Rate Route Frequency Start Stop    24 0908  vancomycin (VANCOCIN) capsule 125 mg        Ordering Provider: Marisabel Massey APRN    125 mg Oral Every 6 Hours Scheduled 24 1200 24 1159              Review of Systems:  See hpi      Physical Exam:   Vital Signs  Temp (24hrs), Av °F (36.7 °C), Min:97.6 °F (36.4 °C), Max:98.5 °F (36.9 °C)    Temp  Min: 97.6 °F (36.4 °C)  Max: 98.5 °F (36.9 °C)  BP  Min: 131/78  Max: 165/88  Pulse  Min: 70  Max: 86  Resp  Min: 16  Max: 19  SpO2  Min: 94 %  Max: 100 %    GENERAL: Awake and alert, in no acute distress. Sitting up in bed  HEENT: Normocephalic, atraumatic. No external oral lesions noted  HEART: RRR, no murmur  LUNGS: CTA B. Nonlabored breathing on room air  ABDOMEN: Soft, nontender, nondistended. No HSM palpated  :  Without Yeung catheter.  MSK: No joint effusions or erythema  SKIN: New rashes or jaundice  NEURO: Oriented to PPT.  Normal speech and cognition  PSYCHIATRIC: Normal insight and judgment. Cooperative with PE    Laboratory Data    Results from last 7 days   Lab Units 24  0522 24  0633 24  0839   WBC 10*3/mm3 14.04* 13.68* 16.03*   HEMOGLOBIN g/dL 8.1* 7.8* 7.8*   HEMATOCRIT % 25.1* 23.5* 23.8*   PLATELETS 10*3/mm3 257 234 233     Results from last 7 days   Lab Units 24  0522   SODIUM mmol/L 139   POTASSIUM mmol/L 3.8   CHLORIDE mmol/L 103   CO2 mmol/L 21.0*   BUN mg/dL 44*   CREATININE mg/dL 6.88*   GLUCOSE mg/dL 105*   CALCIUM mg/dL 7.5*     Results from last 7 days   Lab Units 24  0343   ALK PHOS U/L 108   BILIRUBIN mg/dL 0.3   ALT (SGPT) U/L 22   AST (SGOT) U/L 25             Results from last 7 days   Lab Units 24  0718   LACTATE mmol/L 2.1*     Results from last 7 days   Lab Units 24  0857 24  0343   CK TOTAL U/L 10* 9*     Results from last 7 days   Lab Units 24  0857   VANCOMYCIN TR  mcg/mL <4.00*     Estimated Creatinine Clearance: 11 mL/min (A) (by C-G formula based on SCr of 6.88 mg/dL (H)).      Microbiology:  Blood cultures No growth  Urine culture No growth            Radiology:  Imaging Results (Last 72 Hours)       Procedure Component Value Units Date/Time    XR Chest 1 View [[id number]] Collected: 05/29/24 1319     Updated: 05/29/24 1324    Narrative:      XR CHEST 1 VW    Date of Exam: 5/29/2024 12:00 PM EDT    Indication: dyspnea    Comparison: 5/19/2024  Findings:  Right-sided pacemaker and left chest port again noted. Heart size is within normal limits. There is a moderate sized right pleural effusion, increased. There is a probable small left pleural effusion, new. There is increasing mild atelectasis of the lung   bases. There are chronic emphysematous changes of the lung fields.      Impression:      Impression:  Moderate sized right effusion, increased from prior study. Mild bibasilar atelectasis. Probable small left effusion.      Electronically Signed: Elsa Schmid MD    5/29/2024 1:20 PM EDT    Workstation ID: [id number]    US Renal Bilateral [[id number]] Collected: 05/28/24 1236     Updated: 05/28/24 1241    Narrative:      US RENAL BILATERAL    Date of Exam: 5/28/2024 11:12 AM EDT    Indication: ARACELI.    Comparison: No comparisons available.    Technique: Grayscale and color Doppler ultrasound evaluation of the kidneys and urinary bladder was performed.      Findings:  Kidneys are normal in size and shape bilaterally. Note is made of a right pleural effusion. There is no right hydronephrosis. There is caliectasis of the left upper pole kidney. There is normal blood flow to both kidneys. There are no renal masses.   Ureteral jets appear normal. There is moderate prostate enlargement. There are prostate calcifications.      Impression:      Impression:  Caliectasis of the left upper pole kidney. Right pleural effusion. Enlarged prostate gland with  calcifications.        Electronically Signed: Elsa Schmid MD    5/28/2024 12:38 PM EDT    Workstation ID: [id number]              Impression:   - C. Difficile colitis-diarrhea recently but improved   - Recent bilateral pyelonephritis/treated with Cefepime  - Marked leukocytosis/neutrophilia-Improving  - Lactic acidosis-Improved  - Hypotension/responding to fluid resuscitation-ACTH stimulation test result was normal.  DHEA was normal.  Renin Cristhian level pending.  - Acute on chronic renal failure Stage IV-Not currently on dialysis. Intermittent hypotension likely contributing.  Unclear if another etiology could be contributing as well.  Extensive workup thus far has been negative.  No urine eosinophils. Improving  - Non small cell lung cancer s/p RLL lobectomy/s/p chemotherapy  - Chronic obstructive pulmonary disease with ongoing tobacco abuse  - Type 2 diabetes mellitus  - AV block/ppm  - Essential hypertension      PLAN/RECOMMENDATIONS:   Thank you for asking us to see David Barfield, I recommend the following:    -Continue vancomycin 125 mg by mouth every 6 hours.   -Nephrology holding off on renal biopsy    I discussed in length with the patient and his girlfriend at bedside today    I would be okay with the patient's discharge soon when cleared by nephrology and other teams. I will tentatively plan to extend his oral vancomycin course and taper the vancomycin down over the course of 4 weeks given he is a high risk patient for recurrence which could lead to further hypotension and renal failure.    UM/XAVI:  Vancomycin 125 mg PO Q6h for 1 more week, then vancomycin 125 mg PO BID x 1 week, the vancomycin 125 mg PO Daily x 1 week  Follow up in my clinic within 1 week of discharge      Derian Barry MD  5/30/2024  20:08 EDT

## 2024-05-31 NOTE — PROGRESS NOTES
"   LOS: 4 days    Patient Care Team:  Hayley Castellanos APRN as PCP - General (Nurse Practitioner)  Octaviano Sampson MD as Consulting Physician (Pulmonary Disease)  Neetu Ashley MD as Referring Physician (Hematology and Oncology)  Nimo Rodríguez MD as Consulting Physician (Radiation Oncology)  [place], [place] as Ambulatory  ([facility] [place])    Chief Complaint:  Low BP     Subjective     Bp significant improved on midodrine and florinef.  IVF stopped yesterday. Cr slowly improving. Appetite is good. Patient wants to go home and have f/u in renal clinic next week.     Objective     Vital Sign Min/Max for last 24 hours  Temp  Min: 97.6 °F (36.4 °C)  Max: 98.4 °F (36.9 °C)   BP  Min: 134/78  Max: 151/81   Pulse  Min: 70  Max: 86   Resp  Min: 16  Max: 20   SpO2  Min: 95 %  Max: 99 %   No data recorded   No data recorded     Flowsheet Rows      Flowsheet Row First Filed Value   Admission Height 182.9 cm (72\") Documented at 05/25/2024 0315   Admission Weight 78 kg (172 lb) Documented at 05/25/2024 0315            I/O this shift:  In: -   Out: 1700 [Urine:1700]  I/O last 3 completed shifts:  In: -   Out: 6925 [Urine:6925]    Physical Exam:    General Appearance: Alert, oriented, no obvious distress.  Eyes: PER, EOMI.  Neck: Supple no JVD.  Lungs: Clear auscultation, no rales rhonchi's, equal chest movement, nonlabored.  Heart: No gallop, murmur, rub, RRR.  Abdomen: Soft, nontender, positive bowel sounds, no organomegaly.  Extremities: No edema, no cyanosis.  Neuro: No focal deficit, moving all extremities, alert oriented X 3        WBC WBC   Date Value Ref Range Status   05/31/2024 13.17 (H) 3.40 - 10.80 10*3/mm3 Final   05/30/2024 14.04 (H) 3.40 - 10.80 10*3/mm3 Final   05/29/2024 13.68 (H) 3.40 - 10.80 10*3/mm3 Final      HGB Hemoglobin   Date Value Ref Range Status   05/31/2024 8.0 (L) 13.0 - 17.7 g/dL Final   05/30/2024 8.1 (L) 13.0 - 17.7 g/dL Final   05/29/2024 7.8 (L) 13.0 - 17.7 g/dL Final " "     HCT Hematocrit   Date Value Ref Range Status   05/31/2024 24.6 (L) 37.5 - 51.0 % Final   05/30/2024 25.1 (L) 37.5 - 51.0 % Final   05/29/2024 23.5 (L) 37.5 - 51.0 % Final      Platlets No results found for: \"LABPLAT\"   MCV MCV   Date Value Ref Range Status   05/31/2024 89.1 79.0 - 97.0 fL Final   05/30/2024 88.7 79.0 - 97.0 fL Final   05/29/2024 89.0 79.0 - 97.0 fL Final          Sodium Sodium   Date Value Ref Range Status   05/31/2024 141 136 - 145 mmol/L Final   05/30/2024 139 136 - 145 mmol/L Final   05/29/2024 138 136 - 145 mmol/L Final      Potassium Potassium   Date Value Ref Range Status   05/31/2024 3.7 3.5 - 5.2 mmol/L Final   05/30/2024 3.8 3.5 - 5.2 mmol/L Final   05/29/2024 4.1 3.5 - 5.2 mmol/L Final      Chloride Chloride   Date Value Ref Range Status   05/31/2024 105 98 - 107 mmol/L Final   05/30/2024 103 98 - 107 mmol/L Final   05/29/2024 102 98 - 107 mmol/L Final      CO2 CO2   Date Value Ref Range Status   05/31/2024 21.0 (L) 22.0 - 29.0 mmol/L Final   05/30/2024 21.0 (L) 22.0 - 29.0 mmol/L Final   05/29/2024 19.0 (L) 22.0 - 29.0 mmol/L Final      BUN BUN   Date Value Ref Range Status   05/31/2024 40 (H) 8 - 23 mg/dL Final   05/30/2024 44 (H) 8 - 23 mg/dL Final   05/29/2024 53 (H) 8 - 23 mg/dL Final      Creatinine Creatinine   Date Value Ref Range Status   05/31/2024 6.05 (H) 0.76 - 1.27 mg/dL Final   05/30/2024 6.88 (H) 0.76 - 1.27 mg/dL Final   05/29/2024 7.48 (H) 0.76 - 1.27 mg/dL Final      Calcium Calcium   Date Value Ref Range Status   05/31/2024 7.3 (L) 8.6 - 10.5 mg/dL Final   05/30/2024 7.5 (L) 8.6 - 10.5 mg/dL Final   05/29/2024 7.4 (L) 8.6 - 10.5 mg/dL Final      PO4 No results found for: \"CAPO4\"   Albumin Albumin   Date Value Ref Range Status   05/30/2024 2.9 (L) 3.5 - 5.2 g/dL Final   05/29/2024 2.5 (L) 3.5 - 5.2 g/dL Final      Magnesium Magnesium   Date Value Ref Range Status   05/31/2024 1.1 (L) 1.6 - 2.4 mg/dL Final      Uric Acid No results found for: \"URICACID\"   "        Results Review:     I reviewed the patient's new clinical results.    budesonide-formoterol, 2 puff, Inhalation, BID - RT   And  tiotropium bromide monohydrate, 2 puff, Inhalation, Daily - RT  ferrous sulfate, 325 mg, Oral, Daily With Breakfast  fludrocortisone, 50 mcg, Oral, Daily  megestrol, 20 mg, Oral, Daily  pravastatin, 80 mg, Oral, Nightly  sodium bicarbonate, 1,300 mg, Oral, TID  vancomycin, 125 mg, Oral, Q6H      [Held by provider] sodium chloride, 100 mL/hr, Last Rate: Stopped (05/30/24 0933)        Medication Review: yes    Results from last 7 days   Lab Units 05/31/24  0501 05/30/24  0522 05/29/24  0633 05/28/24  0839 05/27/24  0628 05/26/24  0509   SODIUM mmol/L 141 139 138 130*   < > 133*   POTASSIUM mmol/L 3.7 3.8 4.1 4.0   < > 4.1   CHLORIDE mmol/L 105 103 102 96*   < > 100   CO2 mmol/L 21.0* 21.0* 19.0* 19.0*   < > 21.0*   BUN mg/dL 40* 44* 53* 51*   < > 40*   CREATININE mg/dL 6.05* 6.88* 7.48* 7.90*   < > 5.94*   CALCIUM mg/dL 7.3* 7.5* 7.4* 7.9*   < > 8.0*   ALBUMIN g/dL  --  2.9* 2.5* 2.5*  --  2.4*   WBC 10*3/mm3 13.17* 14.04* 13.68* 16.03*   < > 23.95*   HEMOGLOBIN g/dL 8.0* 8.1* 7.8* 7.8*   < > 8.4*   PLATELETS 10*3/mm3 272 257 234 233   < > 227    < > = values in this interval not displayed.           Assessment & Plan       Hx of hypotension    Mixed hyperlipidemia    Centrilobular emphysema    Tobacco abuse    Malignant neoplasm of lower lobe of right lung    GERD without esophagitis    ARACELI (acute kidney injury)    CAD (coronary artery disease)    Stage 4 chronic kidney disease    Hypotension    Dehydration    C. difficile colitis    UTI (urinary tract infection)    1.  ARACELI on CKD stage IV -7.9 creatinine peaked around 7.6 to last admission, patient went home with a creatinine slowly coming down to 5 range.  Serologic workup -ANCA (-), Unclear etiology - DDx- AIN -y vs pre-renal/atn from hypotension.   2.  Proteinuria   3.  Hypotension - Cortisol level - 15.44 and ACTH pending. level  2 weeks back was 26.33 - Adrenal insufficiency reported with Opdivo in literature along with other endocrine abnormalities. Per wife, he has salt craving and weakness even before Opdivo. ACTH stimulation test result normal, DHEA- normal, Renin activity normal. Aldosterone low 2.3  4.  Hyponatremia    5. Anemia   6.  Metabolic acidosis.   7.  Infectious disease: Patient's followed with ID consultants.  8.  S/p immunotherapy for non-small cell cancer last infusion 4/4/2024 Opdivo.  9- 10- C diff colitis - on vancomycin.      Recommendation:  ARACELI Etiology unspecified Hemodynamic injury due to recurrent hypotension vs AIN in the setting of immunotherapy. Given improvement in renal function will hold off on inpatient renal bx. Patient wants to be discharge today he understand renal function is still significantly away from baseline but he wants to have f/u as outpatient.     Will setup appointment in renal clinic next week. Continue florinef and midodrine on discharge.      High risk complex patient multiple medical problems.    Jeronimo Clarke MD  05/31/24  18:54 EDT

## 2024-05-31 NOTE — PROGRESS NOTES
INFECTIOUS DISEASE f/u     David Barfield  1950  [id number]    Date of Consult: 5/25/2024    Admission Date: 5/25/2024      Requesting Provider: ANAMIKA Sr  Evaluating Physician: David Mistry MD    Reason for Consultation: Cdiff    History of present illness:    Patient is a 74 y.o. male , known to Dr. Derian Barry, with h/o COPD, NSCLC right lung/chemo/RLL lobectomy 1/2024/on Opdivo with last dose around 4/4/2024, T2DM, CAD, AV block/ppm, HTN, HLD, tobacco use, and CKD Stage V/not on dialysis who presented to [facility] ED on 5/25 with hypotension.  He has had multiple admissions over the last month. He had severe leukocytosis and hospitalized from 4/24 to 4/29.  He was started on Cefepime.  A Karius test from 4/26 was negative. He also had persistent diarrhea; however, his Cdiff and GI panel PCR tests were negative from 4/25.  He was admitted from 5/6-5/12 for bilateral pyelonephritis and continued on IV Cefepime with end date tentative for 6/3.  He was admitted to [facility] from 5/20-5/23 for acute renal failure and found to have Cdiff positive PCR and Antigen test.  Cefepime was stopped and he was discharged on oral Vancomycin for 2 weeks.      He was readmitted to [facility] on 5/25 for increased weakness along with nausea, vomiting, and decreased appetite.  He had hypotension at home that did not improve with midodrine.  He was brought to ED by EMS.  He denies fever or chills or worsening diarrhea.  He states that he has had no diarrhea since a couple of days ago.  He has had some dysuria and increased urinary frequency.  He is afebrile.  Pertinent labs were lactic acid 2.3, WBC 31,000 with 83% segs/9% bands, CPK 9, and creatinine 3.27 (baseline 2.03 to 2.4 and last creatinine on 3/22 was 3.86 after acute renal failure episode).  Blood cultures are pending.  A UA WBC was 21-50 with moderate LE and negative nitrite.  No imaging studies have been done on this visit.  He is currently on oral Vancomycin.   ID was asked to evaluate and manage his antibiotic therapy.     5/25/24; Covering for Dr. TAMARA Barry; diarrhea better; having semiformed bowel movements; afebrile, awake, alert, following commands.  Tolerating oral vancomycin    5/28/24: Patient is feeling somewhat better.  Diarrhea has resolved.  He has not had a bowel movement today.  He is not having any fevers.  Leukocytosis is improving.  Creatinine is worse but he states that he is having significant urine output.  No nausea or vomiting.    5/29/24: The patient is eating better.  He is feeling better overall.  Creatinine is slightly better today at 7.48.  White blood cell count is improving to 13.68 today.  He is not having any fevers.  He denies any abdominal pain or flank pain.  Nephrology is considering renal biopsy soon.    5/30/24: The patient is feeling better.  He is eating well.  Still with significant urine output.  Creatinine has trended down to 6.88.  White blood cell count is 14.04.  No diarrhea today.  No nausea or vomiting.  No flank pain or abdominal pain.    5/31/24: The patient is doing well today.  He is eating well.  He states that his electrolytes were off and are being corrected today.  He is still urinating well.  No fevers.  No abdominal pain or flank pain.  No pain with urination.  No diarrhea today. White blood cell count has trended down to 13.17.  Creatinine has trended down to 6.05.    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    Coronary artery disease 2019    Diabetes mellitus Borderline    Emphysema/COPD     Erectile disorder     GERD (gastroesophageal reflux disease)     History of chemotherapy     Hyperlipidemia     Hypertension 2019    Lung nodule     Mumps     Mumps     Pruritus     after bath    Slow to wake up after anesthesia     Stage 5 chronic kidney disease not on chronic dialysis 5/14/2024    Wears  dentures     upper only    Wears hearing aid in both ears     usually only wears right       Past Surgical History:   Procedure Laterality Date    BONE BIOPSY      broken bone surgery in his face    BRONCHOSCOPY THORACOTOMY Right 01/09/2024    Procedure: THORACOTOMY FOR LOWER LOBECTOMY AND MEDISTINAL LYMPH NODE DISSECTION RIGHT;  Surgeon: Joey Patel MD;  Location: [facility] OR;  Service: Cardiothoracic;  Laterality: Right;    BRONCHOSCOPY WITH ION ROBOTIC ASSIST N/A 09/15/2023    Procedure: BRONCHOSCOPY NAVIGATION WITH ENDOBRONCHIAL ULTRASOUND AND ION ROBOT;  Surgeon: Octaviano Sampson MD;  Location:  [facility];  Service: Robotics - Pulmonary;  Laterality: N/A;  ion #6 - 0032  - 0015  Cath guide 0061    EBUS balloon removed and intact    CARDIAC ELECTROPHYSIOLOGY PROCEDURE N/A 08/17/2021    Procedure: Pacemaker DC new;  Surgeon: Kayy Box MD;  Location:  [facility];  Service: Cardiology;  Laterality: N/A;    FACIAL FRACTURE SURGERY      LYMPH NODE BIOPSY  2023    PACEMAKER IMPLANTATION         Family History   Problem Relation Age of Onset    Aneurysm Mother         brain    Dementia Father     Leukemia Sister     Heart disease Paternal Grandmother     Hypertension Paternal Grandfather     Cancer Sister        Social History     Socioeconomic History    Marital status: Single    Number of children: 3   Tobacco Use    Smoking status: Every Day     Current packs/day: 0.50     Average packs/day: 0.5 packs/day for 56.4 years (28.7 ttl pk-yrs)     Types: Cigarettes     Start date: 1/1/1968    Smokeless tobacco: Never    Tobacco comments:     Still smoke   Vaping Use    Vaping status: Never Used   Substance and Sexual Activity    Alcohol use: Never    Drug use: Never    Sexual activity: Yes     Partners: Female     Birth control/protection: None       Allergies   Allergen Reactions    Cymbalta [Duloxetine Hcl] GI Intolerance    Gabapentin Mental Status Change     Pt states  "that this medication \"makes him feel foolish in his head\".     Remeron [Mirtazapine] Other (See Comments)     Excess sedation    Toradol [Ketorolac Tromethamine] GI Intolerance     Projectile vomiting     Latex Other (See Comments)     Latex allergy     Tape Rash         Medication:    Current Facility-Administered Medications:     acetaminophen (TYLENOL) tablet 650 mg, 650 mg, Oral, Q4H PRN **OR** acetaminophen (TYLENOL) 160 MG/5ML oral solution 650 mg, 650 mg, Oral, Q4H PRN **OR** acetaminophen (TYLENOL) suppository 650 mg, 650 mg, Rectal, Q4H PRN, Bryson, Marisabel, APRN    albuterol sulfate HFA (PROVENTIL HFA;VENTOLIN HFA;PROAIR HFA) inhaler 2 puff, 2 puff, Inhalation, Q4H PRN, Bryson, Marisabel, APRN    budesonide-formoterol (SYMBICORT) 160-4.5 MCG/ACT inhaler 2 puff, 2 puff, Inhalation, BID - RT, 2 puff at 05/31/24 0855 **AND** tiotropium (SPIRIVA RESPIMAT) 2.5 mcg/act aerosol solution inhaler, 2 puff, Inhalation, Daily - RT, Bryson, Marisabel, APRN, 2 puff at 05/31/24 0855    ferrous sulfate tablet 325 mg, 325 mg, Oral, Daily With Breakfast, Bryson, Marisabel, APRN, 325 mg at 05/31/24 0811    fludrocortisone tablet 50 mcg, 50 mcg, Oral, Daily, MichoacanoKurt MD, 50 mcg at 05/31/24 0811    megestrol (MEGACE) tablet 20 mg, 20 mg, Oral, Daily, Buddy Lawrence MD, 20 mg at 05/31/24 0811    melatonin tablet 5 mg, 5 mg, Oral, Nightly PRN, Dana Silverman PA-C, 5 mg at 05/30/24 2122    midodrine (PROAMATINE) tablet 10 mg, 10 mg, Oral, TID PRN, Bryson, Marisabel, APRN, 10 mg at 05/26/24 1145    nitroglycerin (NITROSTAT) SL tablet 0.4 mg, 0.4 mg, Sublingual, Q5 Min PRN, Bryson, Marisabel, APRN    ondansetron (ZOFRAN) injection 4 mg, 4 mg, Intravenous, Q6H PRN, Dana Silverman PA-C, 4 mg at 05/27/24 0044    pravastatin (PRAVACHOL) tablet 80 mg, 80 mg, Oral, Nightly, Bryson, Marisabel, APRN, 80 mg at 05/30/24 2122    sodium bicarbonate tablet 1,300 mg, 1,300 mg, Oral, TID, Bryson, Marisabel, APRN, " 1,300 mg at 24 1616    [Held by provider] sodium chloride 0.9 % infusion, 100 mL/hr, Intravenous, Continuous, MichoacanoKurt MD, Stopped at 24 0933    sodium chloride nasal spray 2 spray, 2 spray, Each Nare, PRN, Buddy Lawrence MD    vancomycin (VANCOCIN) capsule 125 mg, 125 mg, Oral, Q6H, Marisabel Massey APRN, 125 mg at 24 1111    Antibiotics:  Anti-Infectives (From admission, onward)      Ordered     Dose/Rate Route Frequency Start Stop    24 1628  vancomycin (VANCOCIN) 125 MG capsule        Ordering Provider: Almas Dowd APRN     Oral Multiple Frequencies 24 0000 24 2359    24 0908  vancomycin (VANCOCIN) capsule 125 mg        Ordering Provider: Marisabel Massey APRN    125 mg Oral Every 6 Hours Scheduled 24 1200 24 1159              Review of Systems:  See hpi      Physical Exam:   Vital Signs  Temp (24hrs), Av.1 °F (36.7 °C), Min:97.6 °F (36.4 °C), Max:98.4 °F (36.9 °C)    Temp  Min: 97.6 °F (36.4 °C)  Max: 98.4 °F (36.9 °C)  BP  Min: 134/78  Max: 151/81  Pulse  Min: 70  Max: 86  Resp  Min: 16  Max: 20  SpO2  Min: 95 %  Max: 99 %    GENERAL: Awake and alert, in no acute distress. Resting comfortably in bed.  Eating  HEENT: Normocephalic, atraumatic. No external oral lesions noted  HEART: Regular rate and rhythm per the heart monitor.  Blood pressure was in the 150s systolic  LUNGS: Nonlabored breathing on room air, not coughing  ABDOMEN: Soft, nontender, nondistended. No HSM palpated  :  Without Yeung catheter.  MSK: No joint effusions or erythema  SKIN: New rashes or jaundice  NEURO: Oriented to PPT.  Normal speech and cognition  PSYCHIATRIC: Normal insight and judgment. Cooperative with PE    Laboratory Data    Results from last 7 days   Lab Units 24  0501 24  0522 24  0633   WBC 10*3/mm3 13.17* 14.04* 13.68*   HEMOGLOBIN g/dL 8.0* 8.1* 7.8*   HEMATOCRIT % 24.6* 25.1* 23.5*   PLATELETS 10*3/mm3 272 257 234      Results from last 7 days   Lab Units 05/31/24  0501   SODIUM mmol/L 141   POTASSIUM mmol/L 3.7   CHLORIDE mmol/L 105   CO2 mmol/L 21.0*   BUN mg/dL 40*   CREATININE mg/dL 6.05*   GLUCOSE mg/dL 100*   CALCIUM mg/dL 7.3*     Results from last 7 days   Lab Units 05/25/24  0343   ALK PHOS U/L 108   BILIRUBIN mg/dL 0.3   ALT (SGPT) U/L 22   AST (SGOT) U/L 25             Results from last 7 days   Lab Units 05/25/24  0718   LACTATE mmol/L 2.1*     Results from last 7 days   Lab Units 05/27/24  0857 05/25/24  0343   CK TOTAL U/L 10* 9*     Results from last 7 days   Lab Units 05/27/24  0857   VANCOMYCIN TR mcg/mL <4.00*     Estimated Creatinine Clearance: 12.5 mL/min (A) (by C-G formula based on SCr of 6.05 mg/dL (H)).      Microbiology:  Blood cultures No growth  Urine culture No growth            Radiology:  Imaging Results (Last 72 Hours)       Procedure Component Value Units Date/Time    XR Chest 1 View [[id number]] Collected: 05/29/24 1319     Updated: 05/29/24 1324    Narrative:      XR CHEST 1 VW    Date of Exam: 5/29/2024 12:00 PM EDT    Indication: dyspnea    Comparison: 5/19/2024  Findings:  Right-sided pacemaker and left chest port again noted. Heart size is within normal limits. There is a moderate sized right pleural effusion, increased. There is a probable small left pleural effusion, new. There is increasing mild atelectasis of the lung   bases. There are chronic emphysematous changes of the lung fields.      Impression:      Impression:  Moderate sized right effusion, increased from prior study. Mild bibasilar atelectasis. Probable small left effusion.      Electronically Signed: Elsa Schmid MD    5/29/2024 1:20 PM EDT    Workstation ID: [id number]              Impression:   - C. Difficile colitis-diarrhea recently but improved   - Recent bilateral pyelonephritis/treated with Cefepime  - Marked leukocytosis/neutrophilia-Improving  - Lactic acidosis-Improved  - Hypotension/responding to fluid  resuscitation-Nephrology working up  - Acute on chronic renal failure Stage IV-Not currently on dialysis. Intermittent hypotension likely contributing.  Unclear if another etiology could be contributing as well.  Extensive workup thus far has been negative.  No urine eosinophils. Improving  - Non small cell lung cancer s/p RLL lobectomy/s/p chemotherapy  - Chronic obstructive pulmonary disease with ongoing tobacco abuse  - Type 2 diabetes mellitus  - AV block/ppm  - Essential hypertension  -Hypocalcemia  -Hypomagnesemia  -Hyperphosphatemia      PLAN/RECOMMENDATIONS:   Thank you for asking us to see David Barfield, I recommend the following:    -Continue vancomycin 125 mg by mouth every 6 hours.   -Primary team and nephrology are working on his electrolytes today  -Nephrology holding off on renal biopsy    I discussed in length with the patient and his girlfriend at bedside today    I would be okay with the patient's discharge soon when cleared by nephrology and other teams. I will tentatively plan to extend his oral vancomycin course and taper the vancomycin down over the course of 4 weeks given he is a high risk patient for recurrence which could lead to further hypotension and renal failure.    UM/XAVI:  Vancomycin 125 mg PO Q6h for 1 more week, then vancomycin 125 mg PO BID x 1 week, the vancomycin 125 mg PO Daily x 1 week  Follow up in my clinic within 1 week of discharge    I reviewed the patient's labs and medications today    Derian Barry MD  5/31/2024  17:14 EDT

## 2024-05-31 NOTE — DISCHARGE SUMMARY
[facility] Medicine Services  DISCHARGE SUMMARY    Patient Name: David Barfield  : 1950  MRN: [id number]    Date of Admission: 2024  3:10 AM  Date of Discharge:  24    Primary Care Physician: Hayley Castellanos APRN    Consults       Date and Time Order Name Status Description    2024  8:25 AM Inpatient Nephrology Consult Completed     2024  9:08 AM Inpatient Infectious Diseases Consult Completed     2024 10:29 AM Inpatient Cardiology Consult Completed     2024  4:27 PM Inpatient Nephrology Consult Completed     2024  4:27 PM Inpatient Hematology & Oncology Consult Completed     2024  4:26 PM Inpatient Infectious Diseases Consult Completed     5/15/2024 11:48 AM Inpatient Nephrology Consult Completed     2024 12:10 PM Inpatient Nephrology Consult Completed     2024  3:25 AM Inpatient Infectious Diseases Consult Completed     2024  2:19 PM Inpatient Hematology & Oncology Consult Completed     2024  9:52 AM Inpatient Nephrology Consult Completed     2024  3:19 PM Inpatient Infectious Diseases Consult Completed             Hospital Course     Presenting Problem: Hypotension    Active Hospital Problems    Diagnosis  POA    **Hx of hypotension [Z86.79]  Not Applicable    Hypotension [I95.9]  Yes    Dehydration [E86.0]  Yes    C. difficile colitis [A04.72]  Yes    UTI (urinary tract infection) [N39.0]  Unknown    Stage 4 chronic kidney disease [N18.4]  Yes    CAD (coronary artery disease) [I25.10]  Yes    ARACELI (acute kidney injury) [N17.9]  Yes    GERD without esophagitis [K21.9]  Yes    Malignant neoplasm of lower lobe of right lung [C34.31]  Yes    Centrilobular emphysema [J43.2]  Yes    Tobacco abuse [Z72.0]  Yes    Mixed hyperlipidemia [E78.2]  Yes      Resolved Hospital Problems   No resolved problems to display.      Hospital Course:  David Barfield is a 74 y.o. male with medical history significant for NSCLC  status-post neoadjuvant chemo, RLL lobectomy 1/2024, subsequent immunotherapy with Opdivo (last dose around 4/4/2024), CAD, essential hypertension, AV block status post PPM, hypertension, emphysema, tobacco use and ARACELI on CKD 4/5 who presented to the ED with low BP on 5/25/2024. Patient has had approximately 4-5 admissions since last month. He was recently discharged on 5/23/2024 and was admitted at that time due to hypotension that resolved with IV fluids. Additionally, patient tested positive for C. difficile and was started on oral vancomycin. ID followed and recommended to continue Vancomycin 125 PO every 6 hours 1 additional week, then vancomycin 125 mg PO BID x1 week, then vancomycin 125 mg PO daily x1 week. He was recommended to follow up with ID Dr. Barry in 1 week. Nephrology followed and assisted with medical management.He was started on fludrocortisone on 5/26 with improvement in BP. The patient and his significant other are adamantly requesting to discharge home today. Nephrology has cleared him for discharge. He was recommended to follow up with PCP on 6/3/2024 as scheduled. He will follow up with NAL next week as well. The patient will be discharged home today in stable condition.      Hypotension, fluid responsive  - BP improved after IV fluids  - Recent workup included TTE, appropriate cortisol, normal TSH, ACTH stimulation test was negative  - Encourage oral intake/megace would need to increase to 200-400mg but waiting with his current GFR  - Continue midodrine  -- Aldosterone low at 2.5, low end of normal  -- Dr. Ríos started fludrocortisone 5/26 along with midodrine and IV fluids.  BP much better.   -- Now off IV fluids   -BP currently improved.     Dyspnea, resolved   --CXR 5/29 shows moderate right effusion, increased from prior study  --Hold IV fluids as BP improving with fludrocortisone  -Currently on RA w/ spo2 of 97%     ARACELI on CKD stage IV  Metabolic  acidosis  Hyperphosphotemia  --Appreciate nephrology input. Question of hemodynamic injury from hypotension vs AIN with immunotherapy. Nephrology considering kidney biopsy this admission.  --renal u/s shows caliectasis of left upper pole kidney.  ?need for  consult, defer to nephrology   --Creatinine 6.05 today, down from 6.88 yesterday.    --Phos 5.7, modify diet to low-phos, corrected Ca wnl  -Mag replaced 5/31  -Follow up with NAL next week      C. difficile colitis  - ID consulted, Dr. Derian Barry following  - Continue oral vancomycin taper as scheduled  - CT abdomen/pelvis without contrast c/w colitis predominantly ascending colon  -Follow up with Dr. Barry in 1 week.     Leukocytosis  - Trending down overall, pt afebrile  - UA noted  - Continue oral vancomycin  - Per ID hold on further IV antibiotics at this time     NSCLC  - Status post neoadjuvant chemo, RLL lobectomy 1/20/2024, immunotherapy (last dose of optivo 4/4/2024)  - Follows with Dr. Ashley, Dr. Ashley consulted and further therapy on hold  -- Per Dr. Ashley, treatment for immunpotherapy induced nephritis is high-dose steroids prednisone 1mg/kg. On fludrocortisone as above.     CAD  AV block status post PPM  Hx hypertension  - Continue statin, hold amlodipine due to hypotension     Chronic anemia  - Monitor CBC.  Stable currently.  Likely component of anemia of chronic disease d/t kidneys  - Continue iron supplement     Emphysema  Tobacco abuse  -Trelegy     Discharge Follow Up Recommendations for outpatient labs/diagnostics:   Follow up with PCP on 6/3 as scheduled.  Follow up with RICHARD next week.  Follow up with Dr. Ashley as scheduled.     Day of Discharge     HPI:   Patient seen resting in bed no apparent distress.  No acute events overnight per nursing.  Significant other at bedside.  He is feeling much better and is requesting to discharge home today.  Nephrology okay with discharge.  Plan to follow-up with RICHARD next week for close  follow-up.  Blood pressure remained stable at this time.  We discussed the importance of taking all medications as prescribed and keeping all recommended pulm appointments.  He expressed no additional concerns at this time.    Vital Signs:   Temp:  [97.6 °F (36.4 °C)-98.4 °F (36.9 °C)] 98.4 °F (36.9 °C)  Heart Rate:  [70-79] 71  Resp:  [16-20] 18  BP: (134-151)/(76-86) 134/78      Physical Exam:  Constitutional: No acute distress, awake, alert  HENT: NCAT, mucous membranes moist  Respiratory: Clear to auscultation bilaterally, respiratory effort normal on RA   Cardiovascular: RRR, no murmurs, palpable pedal pulses bilaterally, cap refill brisk   Gastrointestinal: Positive bowel sounds, soft, nontender, nondistended  Musculoskeletal: No BLE edema   Psychiatric: Appropriate affect, cooperative  Neurologic: Oriented x 3, moves all extremities, speech clear  Skin: warm, dry, no visible rash     Pertinent  and/or Most Recent Results     LAB RESULTS:      Lab 05/31/24  0501 05/30/24  0522 05/29/24  0633 05/28/24  0839 05/27/24  0857 05/27/24  0628 05/26/24  0509 05/25/24  0718 05/25/24  0343   WBC 13.17* 14.04* 13.68* 16.03*  --  21.90* 23.95*  --  30.99*   HEMOGLOBIN 8.0* 8.1* 7.8* 7.8*  --  8.0* 8.4*  --  10.3*   HEMATOCRIT 24.6* 25.1* 23.5* 23.8*  --  24.8* 25.5*  --  31.6*   PLATELETS 272 257 234 233  --  235 227  --  294   NEUTROS ABS  --   --   --   --   --   --  20.34*  --  28.51*   IMMATURE GRANS (ABS)  --   --   --   --   --   --  0.38*  --   --    LYMPHS ABS  --   --   --   --   --   --  1.80  --   --    MONOS ABS  --   --   --   --   --   --  1.35*  --   --    EOS ABS  --   --   --   --   --   --  0.02  --  0.00   MCV 89.1 88.7 89.0 89.8  --  91.5 90.1  --  88.5   LACTATE  --   --   --   --   --   --   --  2.1* 2.3*   LDH  --   --   --   --  181  --   --   --   --          Lab 05/31/24  1020 05/31/24  0501 05/30/24  0522 05/29/24  0633 05/28/24  0839 05/27/24  0628 05/26/24  0509   SODIUM  --  141 139 138 130*  132* 133*   POTASSIUM  --  3.7 3.8 4.1 4.0 4.2 4.1   CHLORIDE  --  105 103 102 96* 99 100   CO2  --  21.0* 21.0* 19.0* 19.0* 18.0* 21.0*   ANION GAP  --  15.0 15.0 17.0* 15.0 15.0 12.0   BUN  --  40* 44* 53* 51* 48* 40*   CREATININE  --  6.05* 6.88* 7.48* 7.90* 6.94* 5.94*   EGFR  --  9.1* 7.8* 7.0* 6.6* 7.7* 9.3*   GLUCOSE  --  100* 105* 92 123* 101* 95   CALCIUM  --  7.3* 7.5* 7.4* 7.9* 7.6* 8.0*   IONIZED CALCIUM 1.05*  --   --   --   --   --   --    MAGNESIUM  --  1.1*  --   --   --   --   --    PHOSPHORUS  --  5.7* 5.9* 5.5* 4.1  --  3.4         Lab 05/30/24  0522 05/29/24  0633 05/28/24  0839 05/26/24  0509 05/25/24  0343   TOTAL PROTEIN  --   --   --   --  6.3   ALBUMIN 2.9* 2.5* 2.5* 2.4* 2.7*   GLOBULIN  --   --   --   --  3.6   ALT (SGPT)  --   --   --   --  22   AST (SGOT)  --   --   --   --  25   BILIRUBIN  --   --   --   --  0.3   ALK PHOS  --   --   --   --  108         Lab 05/25/24  0343   HSTROP T 31*                 Brief Urine Lab Results  (Last result in the past 365 days)        Color   Clarity   Blood   Leuk Est   Nitrite   Protein   CREAT   Urine HCG        05/27/24 1202             25.0         05/27/24 1202 Yellow   Clear   Trace   Large (3+)   Negative   100 mg/dL (2+)                 Microbiology Results (last 10 days)       Procedure Component Value - Date/Time    Eosinophil Smear - Urine, Urine, Clean Catch [[id number]]  (Normal) Collected: 05/27/24 1202    Lab Status: Final result Specimen: Urine, Clean Catch Updated: 05/27/24 1251     Eosinophil Smear 0 % EOS/100 Cells     Narrative:      No eosinophil seen    Blood Culture - Blood, Hand, Left [[id number]]  (Normal) Collected: 05/25/24 0506    Lab Status: Final result Specimen: Blood from Hand, Left Updated: 05/30/24 0730     Blood Culture No growth at 5 days    Narrative:      Less than seven (7) mL's of blood was collected.  Insufficient quantity may yield false negative results.    Blood Culture - Blood, Hand, Right [[id number]]   (Normal) Collected: 05/25/24 0506    Lab Status: Final result Specimen: Blood from Hand, Right Updated: 05/30/24 0730     Blood Culture No growth at 5 days    Narrative:      Less than seven (7) mL's of blood was collected.  Insufficient quantity may yield false negative results.    Urine Culture - Urine, Urine, Clean Catch [[id number]]  (Normal) Collected: 05/25/24 0354    Lab Status: Final result Specimen: Urine, Clean Catch Updated: 05/26/24 1220     Urine Culture No growth    Ova & Parasite Examination - Stool, Per Rectum [[id number]] Collected: 05/22/24 0910    Lab Status: Final result Specimen: Stool from Per Rectum Updated: 05/29/24 1505     Ova + Parasite Exam No ova or parasites seen on concentrated smear     Trichrome Stain No ova or parasites seen on trichrome stain    Clostridioides difficile Toxin - Stool, Per Rectum [[id number]]  (Abnormal) Collected: 05/22/24 0910    Lab Status: Final result Specimen: Stool from Per Rectum Updated: 05/22/24 1104    Narrative:      The following orders were created for panel order Clostridioides difficile Toxin - Stool, Per Rectum.  Procedure                               Abnormality         Status                     ---------                               -----------         ------                     Clostridioides difficile...[[id number]]  Abnormal            Final result                 Please view results for these tests on the individual orders.    Clostridioides difficile Toxin, PCR - Stool, Per Rectum [[id number]]  (Abnormal) Collected: 05/22/24 0910    Lab Status: Final result Specimen: Stool from Per Rectum Updated: 05/22/24 1104     Toxigenic C. difficile by PCR Detected    Narrative:      DNA from a toxigenic strain of C.difficile has been detected.    Clostridioides difficile toxin Ag, Reflex - Stool, Per Rectum [[id number]]  (Abnormal) Collected: 05/22/24 0910    Lab Status: Final result Specimen: Stool from Per Rectum Updated: 05/22/24 1137     C.diff Toxin  Ag Positive    Narrative:      DNA from a toxigenic strain of C.difficile was detected, along with the presence of free toxin. These results are suggestive of C.difficile infection.    Fungus Culture - Body Fluid, Urine, Random Void [[id number]] Collected: 05/21/24 1914    Lab Status: Preliminary result Specimen: Body Fluid from Urine, Random Void Updated: 05/28/24 2000     Fungus Culture No fungus isolated at 1 week    AFB Culture - Body Fluid, Urine, Random Void [[id number]] Collected: 05/21/24 1914    Lab Status: Preliminary result Specimen: Body Fluid from Urine, Random Void Updated: 05/28/24 2000     AFB Culture No AFB isolated at 1 week     AFB Stain No acid fast bacilli seen on concentrated smear            XR Chest 1 View    Result Date: 5/29/2024  XR CHEST 1 VW Date of Exam: 5/29/2024 12:00 PM EDT Indication: dyspnea Comparison: 5/19/2024 Findings: Right-sided pacemaker and left chest port again noted. Heart size is within normal limits. There is a moderate sized right pleural effusion, increased. There is a probable small left pleural effusion, new. There is increasing mild atelectasis of the lung  bases. There are chronic emphysematous changes of the lung fields.     Impression: Moderate sized right effusion, increased from prior study. Mild bibasilar atelectasis. Probable small left effusion. Electronically Signed: Elsa Schmid MD  5/29/2024 1:20 PM EDT  Workstation ID: [id number]    US Renal Bilateral    Result Date: 5/28/2024  US RENAL BILATERAL Date of Exam: 5/28/2024 11:12 AM EDT Indication: ARACELI. Comparison: No comparisons available. Technique: Grayscale and color Doppler ultrasound evaluation of the kidneys and urinary bladder was performed. Findings: Kidneys are normal in size and shape bilaterally. Note is made of a right pleural effusion. There is no right hydronephrosis. There is caliectasis of the left upper pole kidney. There is normal blood flow to both kidneys. There are no renal masses.  Ureteral jets appear normal. There is moderate prostate enlargement. There are prostate calcifications.     Impression: Caliectasis of the left upper pole kidney. Right pleural effusion. Enlarged prostate gland with calcifications. Electronically Signed: Elsa Schmid MD  5/28/2024 12:38 PM EDT  Workstation ID: [id number]    CT Abdomen Pelvis Without Contrast    Result Date: 5/25/2024  CT ABDOMEN PELVIS WO CONTRAST Date of Exam: 5/25/2024 3:09 PM EDT Indication: recent c diff, n/v. elevated wbc. Comparison: 5/19/2024 Technique: Axial CT images were obtained of the abdomen and pelvis without the administration of contrast. Reconstructed coronal and sagittal images were also obtained. Automated exposure control and iterative construction methods were used. Findings: Limited views of the lung bases demonstrates mild groundglass opacity in the left lung base as well as residual right base effusion. The liver is unremarkable. The gallbladder is normal. The pancreas is normal. Spleen is normal. Bilateral adrenal glands are normal. Bilateral kidneys are normal. The bladder is unremarkable. Sigmoid colon and rectum are unremarkable. The descending colon is unremarkable. There is wall thickening and inflammatory changes surrounding the a sending colon. The small bowel is unremarkable. No evidence of abscess is identified. The appendix is normal. The small bowel is unremarkable. The stomach is unremarkable. Prominent vascular calcifications. No adenopathy is identified. There is stranding within the pararenal fat as well as in the mesentery and along the paracolic gutters. This may be secondary to anasarca. No aggressive appearing lytic or sclerotic bone lesions.     Impression: 1. Persistent right effusion. 2. Increased inflammatory changes involving the predominantly ascending colon. Findings are most consistent with colitis. 2. Increased edema within the fat likely reflects anasarca. Electronically Signed: Micha Millan MD   5/25/2024 6:27 PM EDT  Workstation ID: [id number]             Results for orders placed during the hospital encounter of 05/05/24    Adult Transesophageal Echo 3D (DAMIÁN) W/ Cont If Necessary Per Protocol    Interpretation Summary    Left ventricular systolic function is normal. Left ventricular ejection fraction appears to be 61 - 65%. Normal left ventricular cavity size noted. Left ventricular wall thickness is consistent with mild concentric hypertrophy. All left ventricular wall segments contract normally.    There is mild calcification of the aortic valve. The aortic valve appears trileaflet. Mild aortic valve regurgitation is present. No aortic valve stenosis is present. There is no evidence of an aortic valve mass is present.    There is mild calcification of the mitral valve. There is mild, bileaflet mitral valve thickening present. No evidence of a mitral valve mass is present. Mild to moderate mitral valve regurgitation is present. No significant mitral valve stenosis is present.    The tricuspid valve is structurally normal with no significant stenosis present. There is no evidence of a mass on the tricuspid valve. Trace tricuspid valve regurgitation is present.    The pulmonic valve is grossly normal in structure. There is trace pulmonic valve regurgitation present.    No vegetation seen on atrial aspect of pacemaker leads.  The most distal aspects of the RV lead were not completely visualized however there was no apparent vegetation in the more proximal segment, adjacent to the tricuspid valve which appears to be functioning normally.      Plan for Follow-up of Pending Labs/Results: Follow up as directed.    Discharge Details        Discharge Medications        New Medications        Instructions Start Date   fludrocortisone 0.1 MG tablet   0.05 mg, Oral, Daily   Start Date: June 1, 2024            Changes to Medications        Instructions Start Date   vancomycin 125 MG capsule  Commonly known as:  "VANCOCIN  What changed: See the new instructions.   Take 1 capsule by mouth Every 6 (Six) Hours for 7 days, THEN 1 capsule 2 (Two) Times a Day for 7 days, THEN 1 capsule Daily for 7 days. Indications: Colon Inflammation due to Clostridium Bacteria Overgrowth   Start Date: May 31, 2024            Continue These Medications        Instructions Start Date   albuterol sulfate  (90 Base) MCG/ACT inhaler  Commonly known as: PROVENTIL HFA;VENTOLIN HFA;PROAIR HFA   2 puffs, Inhalation, Every 4 Hours PRN      ferrous sulfate 325 (65 FE) MG tablet   325 mg, Oral, Daily With Breakfast, OTC      fluticasone 27.5 MCG/SPRAY nasal spray  Commonly known as: VERAMYST   2 sprays, Nasal, Once As Needed      HYDROcodone-acetaminophen 7.5-325 MG per tablet  Commonly known as: Norco   1 tablet, Oral, Every 6 Hours PRN      lidocaine-prilocaine 2.5-2.5 % cream  Commonly known as: EMLA   1 application , Topical, As Needed      megestrol 20 MG tablet  Commonly known as: MEGACE   20 mg, Oral, Daily      midodrine 10 MG tablet  Commonly known as: PROAMATINE   10 mg, Oral, 3 Times Daily PRN      omeprazole 40 MG capsule  Commonly known as: priLOSEC   40 mg, Oral, Daily      ondansetron 8 MG tablet  Commonly known as: ZOFRAN   8 mg, Oral, 3 Times Daily PRN      pravastatin 80 MG tablet  Commonly known as: PRAVACHOL   80 mg, Oral, Nightly      sodium bicarbonate 650 MG tablet   1,300 mg, Oral, 3 Times Daily      Trelegy Ellipta 100-62.5-25 MCG/ACT inhaler  Generic drug: Fluticasone-Umeclidin-Vilant   1 puff, Inhalation, Daily - RT      vitamin b complex capsule capsule   1 capsule, Oral, Daily, OTC               Allergies   Allergen Reactions    Cymbalta [Duloxetine Hcl] GI Intolerance    Gabapentin Mental Status Change     Pt states that this medication \"makes him feel foolish in his head\".     Remeron [Mirtazapine] Other (See Comments)     Excess sedation    Toradol [Ketorolac Tromethamine] GI Intolerance     Projectile vomiting     " Latex Other (See Comments)     Latex allergy     Tape Rash         Discharge Disposition: Home   Home or Self Care    Diet:  Hospital:  Diet Order   Procedures    Diet: Regular/House, Renal; Low Phosphorus; Fluid Consistency: Thin (IDDSI 0)       Diet Instructions       Diet: Regular/House Diet, Renal Diets; Low Phosphorus; Regular (IDDSI 7); Thin (IDDSI 0)      Discharge Diet:  Regular/House Diet  Renal Diets       Renal Diet: Low Phosphorus    Texture: Regular (IDDSI 7)    Fluid Consistency: Thin (IDDSI 0)             Activity: As tolerated   Activity Instructions       Activity as Tolerated                 CODE STATUS:    Code Status and Medical Interventions:   Ordered at: 05/25/24 0618     Level Of Support Discussed With:    Patient     Code Status (Patient has no pulse and is not breathing):    CPR (Attempt to Resuscitate)     Medical Interventions (Patient has pulse or is breathing):    Full Support       Future Appointments   Date Time Provider Department Center   6/19/2024  8:45 AM Neetu Ashley MD [facility]   6/20/2024 11:15 AM [facility] NM ADMIN RM 1 [facility] PET None   6/20/2024 12:15 PM [facility] PET [facility] PET None   7/10/2024 11:00 AM Kayy Box MD [facility]   8/19/2024  1:30 PM [facility], PFT LAB 2 [facility]   8/19/2024  2:15 PM Octaviano Sampson MD [facility]       Additional Instructions for the Follow-ups that You Need to Schedule       Discharge Follow-up with PCP   As directed       Currently Documented PCP:    Hayley Castellanos APRN    PCP Phone Number:    [phone]     Follow Up Details: Follow up with PCP on 6/3 as scheduled.        Discharge Follow-up with Specified Provider: Follow up with NAL on 6/6/2024 per Dr. Clarke   As directed      To: Follow up with NAL on 6/6/2024 per ANAMIKA Rodriguez  05/31/24      Time Spent on Discharge:  I spent  43  minutes on this discharge activity which included: face-to-face  encounter with the patient, reviewing the data in the system, coordination of the care with the nursing staff as well as consultants, documentation, and entering orders.

## 2024-05-31 NOTE — PROGRESS NOTES
[facility] Medicine Services  PROGRESS NOTE    Patient Name: David Barfield  : 1950  MRN: [id number]    Date of Admission: 2024  Primary Care Physician: Hayley Castellanos APRN    Subjective   Subjective     CC:  Follow up hypotension     HPI:  Patient seen resting in bed in no apparent distress. No acute events overnight per nursing. Significant other at bedside. Patient reports he is feeling about the same today. BP remains controlled. No new complaints at this time.     Objective   Objective     Vital Signs:   Temp:  [97.6 °F (36.4 °C)-98.3 °F (36.8 °C)] 98 °F (36.7 °C)  Heart Rate:  [70-86] 72  Resp:  [16-20] 18  BP: (131-151)/(76-86) 150/86     Physical Exam:  Constitutional: No acute distress, awake, alert  HENT: NCAT, mucous membranes moist  Respiratory: Clear to auscultation bilaterally, respiratory effort normal on RA   Cardiovascular: RRR, no murmurs, palpable pedal pulses bilaterally, cap refill brisk   Gastrointestinal: Positive bowel sounds, soft, nontender, nondistended  Musculoskeletal: No BLE edema   Psychiatric: Appropriate affect, cooperative  Neurologic: Oriented x 3, moves all extremities, speech clear  Skin: warm, dry, no visible rash     Results Reviewed:  LAB RESULTS:      Lab 24  0501 24  0522 24  0633 24  0839 24  0857 24  0628 24  0509 24  0718 24  0343   WBC 13.17* 14.04* 13.68* 16.03*  --  21.90* 23.95*  --  30.99*   HEMOGLOBIN 8.0* 8.1* 7.8* 7.8*  --  8.0* 8.4*  --  10.3*   HEMATOCRIT 24.6* 25.1* 23.5* 23.8*  --  24.8* 25.5*  --  31.6*   PLATELETS 272 257 234 233  --  235 227  --  294   NEUTROS ABS  --   --   --   --   --   --  20.34*  --  28.51*   IMMATURE GRANS (ABS)  --   --   --   --   --   --  0.38*  --   --    LYMPHS ABS  --   --   --   --   --   --  1.80  --   --    MONOS ABS  --   --   --   --   --   --  1.35*  --   --    EOS ABS  --   --   --   --   --   --  0.02  --  0.00   MCV  89.1 88.7 89.0 89.8  --  91.5 90.1  --  88.5   LACTATE  --   --   --   --   --   --   --  2.1* 2.3*   LDH  --   --   --   --  181  --   --   --   --          Lab 05/31/24  0501 05/30/24  0522 05/29/24  0633 05/28/24  0839 05/27/24  0628 05/26/24  0509   SODIUM 141 139 138 130* 132* 133*   POTASSIUM 3.7 3.8 4.1 4.0 4.2 4.1   CHLORIDE 105 103 102 96* 99 100   CO2 21.0* 21.0* 19.0* 19.0* 18.0* 21.0*   ANION GAP 15.0 15.0 17.0* 15.0 15.0 12.0   BUN 40* 44* 53* 51* 48* 40*   CREATININE 6.05* 6.88* 7.48* 7.90* 6.94* 5.94*   EGFR 9.1* 7.8* 7.0* 6.6* 7.7* 9.3*   GLUCOSE 100* 105* 92 123* 101* 95   CALCIUM 7.3* 7.5* 7.4* 7.9* 7.6* 8.0*   MAGNESIUM 1.1*  --   --   --   --   --    PHOSPHORUS 5.7* 5.9* 5.5* 4.1  --  3.4         Lab 05/30/24  0522 05/29/24  0633 05/28/24  0839 05/26/24  0509 05/25/24  0343   TOTAL PROTEIN  --   --   --   --  6.3   ALBUMIN 2.9* 2.5* 2.5* 2.4* 2.7*   GLOBULIN  --   --   --   --  3.6   ALT (SGPT)  --   --   --   --  22   AST (SGOT)  --   --   --   --  25   BILIRUBIN  --   --   --   --  0.3   ALK PHOS  --   --   --   --  108         Lab 05/25/24  0343   HSTROP T 31*                 Brief Urine Lab Results  (Last result in the past 365 days)        Color   Clarity   Blood   Leuk Est   Nitrite   Protein   CREAT   Urine HCG        05/27/24 1202             25.0         05/27/24 1202 Yellow   Clear   Trace   Large (3+)   Negative   100 mg/dL (2+)                   Microbiology Results Abnormal       Procedure Component Value - Date/Time    Blood Culture - Blood, Hand, Left [[id number]]  (Normal) Collected: 05/25/24 0506    Lab Status: Final result Specimen: Blood from Hand, Left Updated: 05/30/24 0730     Blood Culture No growth at 5 days    Narrative:      Less than seven (7) mL's of blood was collected.  Insufficient quantity may yield false negative results.    Blood Culture - Blood, Hand, Right [[id number]]  (Normal) Collected: 05/25/24 0506    Lab Status: Final result Specimen: Blood from Hand, Right  Updated: 05/30/24 0730     Blood Culture No growth at 5 days    Narrative:      Less than seven (7) mL's of blood was collected.  Insufficient quantity may yield false negative results.    Eosinophil Smear - Urine, Urine, Clean Catch [[id number]]  (Normal) Collected: 05/27/24 1202    Lab Status: Final result Specimen: Urine, Clean Catch Updated: 05/27/24 1251     Eosinophil Smear 0 % EOS/100 Cells     Narrative:      No eosinophil seen    Urine Culture - Urine, Urine, Clean Catch [[id number]]  (Normal) Collected: 05/25/24 0354    Lab Status: Final result Specimen: Urine, Clean Catch Updated: 05/26/24 1220     Urine Culture No growth            XR Chest 1 View    Result Date: 5/29/2024  XR CHEST 1 VW Date of Exam: 5/29/2024 12:00 PM EDT Indication: dyspnea Comparison: 5/19/2024 Findings: Right-sided pacemaker and left chest port again noted. Heart size is within normal limits. There is a moderate sized right pleural effusion, increased. There is a probable small left pleural effusion, new. There is increasing mild atelectasis of the lung  bases. There are chronic emphysematous changes of the lung fields.     Impression: Impression: Moderate sized right effusion, increased from prior study. Mild bibasilar atelectasis. Probable small left effusion. Electronically Signed: Elsa Schmid MD  5/29/2024 1:20 PM EDT  Workstation ID: [id number]     Results for orders placed during the hospital encounter of 05/05/24    Adult Transesophageal Echo 3D (DAMIÁN) W/ Cont If Necessary Per Protocol    Interpretation Summary    Left ventricular systolic function is normal. Left ventricular ejection fraction appears to be 61 - 65%. Normal left ventricular cavity size noted. Left ventricular wall thickness is consistent with mild concentric hypertrophy. All left ventricular wall segments contract normally.    There is mild calcification of the aortic valve. The aortic valve appears trileaflet. Mild aortic valve regurgitation is present. No  aortic valve stenosis is present. There is no evidence of an aortic valve mass is present.    There is mild calcification of the mitral valve. There is mild, bileaflet mitral valve thickening present. No evidence of a mitral valve mass is present. Mild to moderate mitral valve regurgitation is present. No significant mitral valve stenosis is present.    The tricuspid valve is structurally normal with no significant stenosis present. There is no evidence of a mass on the tricuspid valve. Trace tricuspid valve regurgitation is present.    The pulmonic valve is grossly normal in structure. There is trace pulmonic valve regurgitation present.    No vegetation seen on atrial aspect of pacemaker leads.  The most distal aspects of the RV lead were not completely visualized however there was no apparent vegetation in the more proximal segment, adjacent to the tricuspid valve which appears to be functioning normally.      Current medications:  Scheduled Meds:budesonide-formoterol, 2 puff, Inhalation, BID - RT   And  tiotropium bromide monohydrate, 2 puff, Inhalation, Daily - RT  ferrous sulfate, 325 mg, Oral, Daily With Breakfast  fludrocortisone, 50 mcg, Oral, Daily  megestrol, 20 mg, Oral, Daily  pravastatin, 80 mg, Oral, Nightly  sodium bicarbonate, 1,300 mg, Oral, TID  vancomycin, 125 mg, Oral, Q6H      Continuous Infusions:[Held by provider] sodium chloride, 100 mL/hr, Last Rate: Stopped (05/30/24 0933)      PRN Meds:.  acetaminophen **OR** acetaminophen **OR** acetaminophen    albuterol sulfate HFA    melatonin    midodrine    nitroglycerin    ondansetron    sodium chloride    Assessment & Plan   Assessment & Plan     Active Hospital Problems    Diagnosis  POA    **Hx of hypotension [Z86.79]  Not Applicable    Hypotension [I95.9]  Yes    Dehydration [E86.0]  Yes    C. difficile colitis [A04.72]  Yes    UTI (urinary tract infection) [N39.0]  Unknown    Stage 4 chronic kidney disease [N18.4]  Yes    CAD (coronary artery  disease) [I25.10]  Yes    ARACELI (acute kidney injury) [N17.9]  Yes    GERD without esophagitis [K21.9]  Yes    Malignant neoplasm of lower lobe of right lung [C34.31]  Yes    Centrilobular emphysema [J43.2]  Yes    Tobacco abuse [Z72.0]  Yes    Mixed hyperlipidemia [E78.2]  Yes      Resolved Hospital Problems   No resolved problems to display.        Brief Hospital Course to date:  David Barfield is a 74 y.o. male  with medical history significant for NSCLC status-post neoadjuvant chemo, RLL lobectomy 1/2024, subsequent immunotherapy with Opdivo (last dose around 4/4/2024), CAD, essential hypertension, AV block status post PPM, hypertension, emphysema, tobacco use and ARACELI on CKD 4/5 who presented to the ED with low BP. Patient has had approximately 4-5 admissions since last month. He was recently discharged on 5/23/2024 and was admitted at that time due to hypotension that resolved with IV fluids. Additionally, patient tested positive for C. difficile and was started on oral vancomycin. ID and nephrology are following.    This patient's problems and plans were partially entered by my partner and updated as appropriate by me 05/31/24.     Hypotension, fluid responsive  - BP improved after IV fluids  - Recent workup included TTE, appropriate cortisol, normal TSH, ACTH stimulation test was negative  - Encourage oral intake/megace would need to increase to 200-400mg but waiting with his current GFR  - Continue midodrine  -- Aldosterone low at 2.5, low end of normal  -- Dr. Ríos started fludrocortisone 5/26 along with midodrine and IV fluids.  BP much better.   -- Now off IV fluids   -continue monitor BP     Dyspnea  --CXR 5/29 shows moderate right effusion, increased from prior study  --Hold IV fluids as BP improving with fludocortisone, sats are 98% on RA  -Currently on RA w/ spo2 of 97%     ARACELI on CKD stage IV  Metabolic acidosis  Hyperphosphotemia  --Appreciate nephrology input. Question of hemodynamic injury from  hypotension vs AIN with immunotherapy. Nephrology considering kidney biopsy this admission.  --renal u/s shows caliectasis of left upper pole kidney.  ?need for  consult, defer to nephrology   --Creatinine 6.05 today, down from 6.88 yesterday.    --Phos 5.7, modify diet to low-phos, corrected Ca wnl  -Check ionized calcium   -- AM BMP, mag, phos in AM       C. difficile colitis  - ID consulted, Dr. Derian Barry following  - Continue oral vancomycin 125 mg every 6 hours for total of 2 weeks  - CT abdomen/pelvis without contrast c/w colitis predominantly ascending colon     Leukocytosis  - Trending down overall, pt afebrile  - UA noted  - Continue oral vancomycin  - Per ID hold on further IV antibiotics at this time  -- AM CBC     NSCLC  - Status post neoadjuvant chemo, RLL lobectomy 1/20/2024, immunotherapy (last dose of optivo 4/4/2024)  - Follows with Dr. Ashley, Dr. Ashley consulted and further therapy on hold  -- Per Dr. Ashley, treatment for immunpotherapy induced nephritis is high-dose steroids prednisone 1mg/kg. On fludrocortisone as above.     CAD  AV block status post PPM  Hx hypertension  - Continue statin, hold amlodipine due to hypotension     Chronic anemia  - Monitor CBC.  Stable currently.  Likely component of anemia of chronic disease d/t kidneys  - Continue iron supplement     Emphysema  Tobacco abuse  -Trelegy        Expected Discharge Location and Transportation: Home  Expected Discharge pending BP trend off IV fluids  Expected Discharge Date: 5/31/2024; Expected Discharge Time:       DVT prophylaxis:  Mechanical DVT prophylaxis orders are present.    AM-PAC 6 Clicks Score (PT): 23 (05/31/24 0800)    CODE STATUS:   Code Status and Medical Interventions:   Ordered at: 05/25/24 0618     Level Of Support Discussed With:    Patient     Code Status (Patient has no pulse and is not breathing):    CPR (Attempt to Resuscitate)     Medical Interventions (Patient has pulse or is breathing):    Full  Support       Almas Dowd, APRN  05/31/24

## 2024-05-31 NOTE — CASE MANAGEMENT/SOCIAL WORK
Continued Stay Note  [facility]     Patient Name: David Barfield  MRN: [id number]  Today's Date: 5/31/2024    Admit Date: 5/25/2024    Plan: Home   Discharge Plan       Row Name 05/31/24 1444       Plan    Plan Home    Patient/Family in Agreement with Plan yes    Plan Comments Discussed Mr. Barfield in MDR. Discharge plan is home. CM will continue to follow.    Final Discharge Disposition Code 01 - home or self-care                   Discharge Codes    No documentation.                 Expected Discharge Date and Time       Expected Discharge Date Expected Discharge Time    Jun 1, 2024               Chapis Felder RN

## 2024-05-31 NOTE — OUTREACH NOTE
Prep Survey      Flowsheet Row Responses   [facility] patient discharged from? [place]   Is LACE score < 7 ? No   Eligibility [facility]   Date of Admission 05/25/24   Date of Discharge 05/31/24   Discharge Disposition Home or Self Care   Discharge diagnosis Hx of hypotension   Does the patient have one of the following disease processes/diagnoses(primary or secondary)? Other   Does the patient have Home health ordered? No   Is there a DME ordered? No   Medication alerts for this patient see AVS   Prep survey completed? Yes            Aimee MEYERS - Registered Nurse

## 2024-06-03 ENCOUNTER — TRANSITIONAL CARE MANAGEMENT TELEPHONE ENCOUNTER (OUTPATIENT)
Dept: CALL CENTER | Facility: HOSPITAL | Age: 74
End: 2024-06-03
Payer: MEDICARE

## 2024-06-03 ENCOUNTER — LAB (OUTPATIENT)
Dept: LAB | Facility: HOSPITAL | Age: 74
End: 2024-06-03
Payer: MEDICARE

## 2024-06-03 ENCOUNTER — OFFICE VISIT (OUTPATIENT)
Dept: INTERNAL MEDICINE | Facility: CLINIC | Age: 74
End: 2024-06-03
Payer: MEDICARE

## 2024-06-03 VITALS
TEMPERATURE: 97.8 F | HEIGHT: 72 IN | HEART RATE: 76 BPM | WEIGHT: 161.2 LBS | BODY MASS INDEX: 21.83 KG/M2 | SYSTOLIC BLOOD PRESSURE: 116 MMHG | DIASTOLIC BLOOD PRESSURE: 62 MMHG | OXYGEN SATURATION: 98 %

## 2024-06-03 DIAGNOSIS — N17.9 AKI (ACUTE KIDNEY INJURY): ICD-10-CM

## 2024-06-03 DIAGNOSIS — N39.0 URINARY TRACT INFECTION WITHOUT HEMATURIA, SITE UNSPECIFIED: ICD-10-CM

## 2024-06-03 DIAGNOSIS — E86.1 HYPOTENSION DUE TO HYPOVOLEMIA: ICD-10-CM

## 2024-06-03 DIAGNOSIS — D72.819 LEUKOPENIA, UNSPECIFIED TYPE: ICD-10-CM

## 2024-06-03 DIAGNOSIS — Z13.21 ENCOUNTER FOR VITAMIN DEFICIENCY SCREENING: ICD-10-CM

## 2024-06-03 DIAGNOSIS — A04.72 C. DIFFICILE COLITIS: ICD-10-CM

## 2024-06-03 DIAGNOSIS — Z76.89 ENCOUNTER FOR SUPPORT AND COORDINATION OF TRANSITION OF CARE: Primary | ICD-10-CM

## 2024-06-03 DIAGNOSIS — N18.4 CHRONIC KIDNEY DISEASE, STAGE 4 (SEVERE): ICD-10-CM

## 2024-06-03 DIAGNOSIS — Z09 HOSPITAL DISCHARGE FOLLOW-UP: ICD-10-CM

## 2024-06-03 DIAGNOSIS — N18.4 STAGE 4 CHRONIC KIDNEY DISEASE: ICD-10-CM

## 2024-06-03 DIAGNOSIS — R53.83 OTHER FATIGUE: ICD-10-CM

## 2024-06-03 LAB
ANION GAP SERPL CALCULATED.3IONS-SCNC: 17.7 MMOL/L (ref 5–15)
BACTERIA UR QL AUTO: ABNORMAL /HPF
BASOPHILS # BLD AUTO: 0.06 10*3/MM3 (ref 0–0.2)
BASOPHILS NFR BLD AUTO: 0.4 % (ref 0–1.5)
BILIRUB UR QL STRIP: NEGATIVE
BUN SERPL-MCNC: 23 MG/DL (ref 8–23)
BUN/CREAT SERPL: 8.2 (ref 7–25)
CALCIUM SPEC-SCNC: 9.1 MG/DL (ref 8.6–10.5)
CHLORIDE SERPL-SCNC: 100 MMOL/L (ref 98–107)
CK SERPL-CCNC: 15 U/L (ref 20–200)
CLARITY UR: CLEAR
CO2 SERPL-SCNC: 19.3 MMOL/L (ref 22–29)
COLOR UR: YELLOW
CORTIS SERPL-MCNC: 15.8 MCG/DL
CREAT SERPL-MCNC: 2.82 MG/DL (ref 0.76–1.27)
D-LACTATE SERPL-SCNC: 1.7 MMOL/L (ref 0.5–2)
DEPRECATED RDW RBC AUTO: 50.8 FL (ref 37–54)
EGFRCR SERPLBLD CKD-EPI 2021: 22.6 ML/MIN/1.73
EOSINOPHIL # BLD AUTO: 0 10*3/MM3 (ref 0–0.4)
EOSINOPHIL NFR BLD AUTO: 0 % (ref 0.3–6.2)
ERYTHROCYTE [DISTWIDTH] IN BLOOD BY AUTOMATED COUNT: 15.6 % (ref 12.3–15.4)
GLUCOSE SERPL-MCNC: 87 MG/DL (ref 65–99)
GLUCOSE UR STRIP-MCNC: ABNORMAL MG/DL
HCT VFR BLD AUTO: 30.6 % (ref 37.5–51)
HGB BLD-MCNC: 9.9 G/DL (ref 13–17.7)
HGB UR QL STRIP.AUTO: NEGATIVE
HYALINE CASTS UR QL AUTO: ABNORMAL /LPF
IMM GRANULOCYTES # BLD AUTO: 0.07 10*3/MM3 (ref 0–0.05)
IMM GRANULOCYTES NFR BLD AUTO: 0.5 % (ref 0–0.5)
KETONES UR QL STRIP: NEGATIVE
LEUKOCYTE ESTERASE UR QL STRIP.AUTO: ABNORMAL
LYMPHOCYTES # BLD AUTO: 2.24 10*3/MM3 (ref 0.7–3.1)
LYMPHOCYTES NFR BLD AUTO: 14.9 % (ref 19.6–45.3)
MAGNESIUM SERPL-MCNC: 1.7 MG/DL (ref 1.6–2.4)
MCH RBC QN AUTO: 29.4 PG (ref 26.6–33)
MCHC RBC AUTO-ENTMCNC: 32.4 G/DL (ref 31.5–35.7)
MCV RBC AUTO: 90.8 FL (ref 79–97)
MONOCYTES # BLD AUTO: 1.22 10*3/MM3 (ref 0.1–0.9)
MONOCYTES NFR BLD AUTO: 8.1 % (ref 5–12)
NEUTROPHILS NFR BLD AUTO: 11.42 10*3/MM3 (ref 1.7–7)
NEUTROPHILS NFR BLD AUTO: 76.1 % (ref 42.7–76)
NITRITE UR QL STRIP: NEGATIVE
NRBC BLD AUTO-RTO: 0 /100 WBC (ref 0–0.2)
PH UR STRIP.AUTO: 7.5 [PH] (ref 5–8)
PHOSPHATE SERPL-MCNC: 3.7 MG/DL (ref 2.5–4.5)
PLATELET # BLD AUTO: 452 10*3/MM3 (ref 140–450)
PMV BLD AUTO: 8.7 FL (ref 6–12)
POTASSIUM SERPL-SCNC: 3.4 MMOL/L (ref 3.5–5.2)
PROT UR QL STRIP: ABNORMAL
RBC # BLD AUTO: 3.37 10*6/MM3 (ref 4.14–5.8)
RBC # UR STRIP: ABNORMAL /HPF
REF LAB TEST METHOD: ABNORMAL
SODIUM SERPL-SCNC: 137 MMOL/L (ref 136–145)
SP GR UR STRIP: 1.01 (ref 1–1.03)
SQUAMOUS #/AREA URNS HPF: ABNORMAL /HPF
URATE SERPL-MCNC: 3.5 MG/DL (ref 3.4–7)
UROBILINOGEN UR QL STRIP: ABNORMAL
WBC # UR STRIP: ABNORMAL /HPF
WBC NRBC COR # BLD AUTO: 15.01 10*3/MM3 (ref 3.4–10.8)

## 2024-06-03 PROCEDURE — 81001 URINALYSIS AUTO W/SCOPE: CPT

## 2024-06-03 PROCEDURE — 1159F MED LIST DOCD IN RCRD: CPT | Performed by: NURSE PRACTITIONER

## 2024-06-03 PROCEDURE — 80048 BASIC METABOLIC PNL TOTAL CA: CPT

## 2024-06-03 PROCEDURE — 1126F AMNT PAIN NOTED NONE PRSNT: CPT | Performed by: NURSE PRACTITIONER

## 2024-06-03 PROCEDURE — 99495 TRANSJ CARE MGMT MOD F2F 14D: CPT | Performed by: NURSE PRACTITIONER

## 2024-06-03 PROCEDURE — 83735 ASSAY OF MAGNESIUM: CPT

## 2024-06-03 PROCEDURE — 36415 COLL VENOUS BLD VENIPUNCTURE: CPT

## 2024-06-03 PROCEDURE — 80202 ASSAY OF VANCOMYCIN: CPT

## 2024-06-03 PROCEDURE — 83605 ASSAY OF LACTIC ACID: CPT

## 2024-06-03 PROCEDURE — 82550 ASSAY OF CK (CPK): CPT

## 2024-06-03 PROCEDURE — 85025 COMPLETE CBC W/AUTO DIFF WBC: CPT

## 2024-06-03 PROCEDURE — 3074F SYST BP LT 130 MM HG: CPT | Performed by: NURSE PRACTITIONER

## 2024-06-03 PROCEDURE — 84550 ASSAY OF BLOOD/URIC ACID: CPT

## 2024-06-03 PROCEDURE — 1111F DSCHRG MED/CURRENT MED MERGE: CPT | Performed by: NURSE PRACTITIONER

## 2024-06-03 PROCEDURE — 3078F DIAST BP <80 MM HG: CPT | Performed by: NURSE PRACTITIONER

## 2024-06-03 PROCEDURE — 82607 VITAMIN B-12: CPT

## 2024-06-03 PROCEDURE — 84100 ASSAY OF PHOSPHORUS: CPT

## 2024-06-03 PROCEDURE — 84590 ASSAY OF VITAMIN A: CPT

## 2024-06-03 PROCEDURE — 82533 TOTAL CORTISOL: CPT

## 2024-06-03 PROCEDURE — 82330 ASSAY OF CALCIUM: CPT

## 2024-06-03 PROCEDURE — 82306 VITAMIN D 25 HYDROXY: CPT

## 2024-06-03 PROCEDURE — 1160F RVW MEDS BY RX/DR IN RCRD: CPT | Performed by: NURSE PRACTITIONER

## 2024-06-03 NOTE — PROGRESS NOTES
Transitional Care Follow Up Visit  Subjective     David Barfield is a 74 y.o. male who presents for a transitional care management visit.    Within 48 business hours after discharge our office contacted him via telephone to coordinate his care and needs.      I reviewed and discussed the details of that call along with the discharge summary, hospital problems, inpatient lab results, inpatient diagnostic studies, and consultation reports with David.     Current outpatient and discharge medications have been reconciled for the patient.  Reviewed by: ANAMIKA Appiah          5/31/2024     5:49 PM   Date of TCM Phone Call   [facility]   Date of Admission 5/25/2024   Date of Discharge 5/31/2024   Discharge Disposition Home or Self Care     Risk for Readmission (LACE) Score: 17 (5/31/2024  6:00 AM)      History of Present Illness  Course During Hospital Stay: Patient presents today for TCM/hospital discharge follow-up      Significant other accompanied patient today and did most of the talking and recommendations versus the patient.      Patient has had multiple hospitalizations over the last few months.      This recent admission was May 25 to May 31.  Patient was seen by nephrology, infectious disease, cardiology, hematology oncology while in the hospital.    Patient was seen in the ER for low blood pressure.  He also tested positive for C. difficile and started on oral vancomycin.  Infectious diseases following and recommended continue the oral vancomycin.  Recommended for patient to follow-up with infectious disease in 1 week.  Nephrology also was assisting during the hospitalization.  Patient was started on fludrocortisone for his blood pressure medication management.  Patient is also on midodrine.  [facility] is [facility] within the next few weeks    Patient significant other wanted a multitude of labs to be ordered including CBC, BMP,  urinalysis, uric acid, vancomycin level, creatinine kinase, ionized calcium, phosphorus, magnesium, vitamins a, vitamin B, vitamin C, vitamin D, lactic acid, differential on the CBC, cortisol test.  Recommended to patient that the infectious disease or nephrology does need to be taking over these labs.  She stated that they recommended PCP do this.  Patient has continued to fludrocortisone.  Blood pressures have been much better.    Wife is also requesting a updated referral to nephrology for just a second opinion for further evaluation                   The following portions of the patient's history were reviewed and updated as appropriate: allergies, current medications, past family history, past medical history, past social history, past surgical history, and problem list.        Objective   Vitals:    06/03/24 1020   BP: 116/62   Pulse: 76   Temp: 97.8 °F (36.6 °C)   SpO2: 98%     Body mass index is 21.86 kg/m².            Physical Exam  Vitals and nursing note reviewed.   Constitutional:       Appearance: Normal appearance.   HENT:      Head: Normocephalic and atraumatic.   Eyes:      Extraocular Movements: Extraocular movements intact.      Pupils: Pupils are equal, round, and reactive to light.   Cardiovascular:      Rate and Rhythm: Normal rate and regular rhythm.      Heart sounds: Normal heart sounds.   Pulmonary:      Effort: Pulmonary effort is normal.      Breath sounds: Normal breath sounds.   Musculoskeletal:         General: Normal range of motion.   Skin:     General: Skin is warm and dry.   Neurological:      Mental Status: He is alert and oriented to person, place, and time.   Psychiatric:         Mood and Affect: Mood normal.         Behavior: Behavior normal.         Assessment & Plan   Diagnoses and all orders for this visit:    1. Encounter for support and coordination of transition of care (Primary)    2. Hospital discharge follow-up    3. C. difficile colitis  -     CBC & Differential;  Future  -     Basic Metabolic Panel; Future  -     Vancomycin level, random; Future  -     CK; Future    4. Leukopenia, unspecified type  -     CBC & Differential; Future  -     CK; Future    5. Hypotension due to hypovolemia  -     CK; Future  -     Lactic Acid, Plasma; Future    6. Urinary tract infection without hematuria, site unspecified  -     Urinalysis With Culture If Indicated -; Future  -     Cortisol; Future    7. ARACELI (acute kidney injury)  -     Urinalysis With Culture If Indicated -; Future  -     Uric acid; Future  -     Calcium, Ionized; Future  -     Lactic Acid, Plasma; Future  -     Cortisol; Future    8. Encounter for vitamin deficiency screening  -     Phosphorus; Future  -     Magnesium; Future  -     Vitamin B12; Future  -     Vitamin D,25-Hydroxy; Future  -     Vitamin A; Future  -     Vitamin C; Future    9. Other fatigue  -     Calcium, Ionized; Future  -     Phosphorus; Future  -     Magnesium; Future  -     Vitamin B12; Future  -     Vitamin D,25-Hydroxy; Future  -     Vitamin A; Future  -     Vitamin C; Future    10. Stage 4 chronic kidney disease  -     Cancel: Ambulatory Referral to Nephrology  -     Cortisol; Future  -     Ambulatory Referral to Nephrology    11. Chronic kidney disease, stage 4 (severe)  -     Vitamin D,25-Hydroxy; Future    Plan  TCM/hospital discharge follow-up completed with patient significant other    Per significant other request, the above labs were ordered.  Discussed with significant other that ideally this does need to come from the specialist    Continue with current medication therapy.  Continue to monitor blood pressure at home    Keep upcoming appointment with nephrology.  Updated referral to nephrology at her request as stat for second opinion was placed    Patient will continue to follow with infectious disease for his C. difficile.  No diarrhea since last visit.    Go to ER if any condition worsens or severe    Patient significant other wanted a once  weekly follow-up for the next few weeks.  This will need to be 30-minute visits.           Return for once weekly- please make 30 minutes.    ANAMIKA Appiah     Part of this note may be an electronic transcription/translation of spoken language to printed text using the Dragon Dictation System.

## 2024-06-03 NOTE — OUTREACH NOTE
Call Center TCM Note      Flowsheet Row Responses   [facility] patient discharged from? [place]   Does the patient have one of the following disease processes/diagnoses(primary or secondary)? Other   TCM attempt successful? Yes   Discharge diagnosis Hx of hypotension   Does the patient have an appointment with their PCP within 7-14 days of discharge? Yes  [6/3/24 at 10:30 AM.]   TCM call completed? Yes   Wrap up additional comments Patient had TCM PCP hospital fu appt today, 6/3/24 at 10:30 AM.   Would this patient benefit from a Referral to [facility] Social Work? No   Is the patient interested in additional calls from an ambulatory ? No            Precious Luke, RN    6/3/2024, 10:44 EDT

## 2024-06-04 ENCOUNTER — TELEPHONE (OUTPATIENT)
Dept: INTERNAL MEDICINE | Facility: CLINIC | Age: 74
End: 2024-06-04
Payer: MEDICARE

## 2024-06-04 LAB
25(OH)D3 SERPL-MCNC: 21.6 NG/ML (ref 30–100)
CA-I BLD-MCNC: 4.9 MG/DL (ref 4.6–5.4)
CA-I SERPL ISE-MCNC: 1.22 MMOL/L (ref 1.15–1.35)
FUNGUS WND CULT: NORMAL
HOLD SPECIMEN: NORMAL
MYCOBACTERIUM SPEC CULT: NORMAL
NIGHT BLUE STAIN TISS: NORMAL
VANCOMYCIN SERPL-MCNC: <4 MCG/ML (ref 5–40)
VIT B12 BLD-MCNC: 455 PG/ML (ref 211–946)

## 2024-06-04 NOTE — TELEPHONE ENCOUNTER
Spoke to patient's wife regarding results and recommendations.  She states she has started patient on some potassium, magnesium, and Vitamin D since these were low.  They will plan to have Vitamin C level redrawn since canceled at next week appointment.

## 2024-06-04 NOTE — TELEPHONE ENCOUNTER
----- Message from Hayley Castellanos sent at 6/4/2024  9:19 AM EDT -----  Please go ahead and fax over these results to [facility] as well as the infectious disease group Dr. Zuleta.  I will plan to do a follow-up telephone encounter regarding the results to send to patient and family.  It would be recommended that infectious disease or [facility] take over the orders for these labs versus primary care.  Please make sure that these 2 locations are aware

## 2024-06-05 DIAGNOSIS — E86.1 HYPOTENSION DUE TO HYPOVOLEMIA: ICD-10-CM

## 2024-06-05 DIAGNOSIS — A04.72 C. DIFFICILE COLITIS: Primary | ICD-10-CM

## 2024-06-05 DIAGNOSIS — N17.9 AKI (ACUTE KIDNEY INJURY): ICD-10-CM

## 2024-06-05 DIAGNOSIS — D72.819 LEUKOPENIA, UNSPECIFIED TYPE: ICD-10-CM

## 2024-06-05 DIAGNOSIS — Z13.21 ENCOUNTER FOR VITAMIN DEFICIENCY SCREENING: ICD-10-CM

## 2024-06-06 ENCOUNTER — PATIENT OUTREACH (OUTPATIENT)
Dept: CASE MANAGEMENT | Facility: OTHER | Age: 74
End: 2024-06-06
Payer: MEDICARE

## 2024-06-06 DIAGNOSIS — A04.72 C. DIFFICILE COLITIS: ICD-10-CM

## 2024-06-06 DIAGNOSIS — I95.89 CHRONIC HYPOTENSION: Primary | ICD-10-CM

## 2024-06-06 DIAGNOSIS — J43.2 CENTRILOBULAR EMPHYSEMA: ICD-10-CM

## 2024-06-06 DIAGNOSIS — C34.31 MALIGNANT NEOPLASM OF LOWER LOBE OF RIGHT LUNG: ICD-10-CM

## 2024-06-06 NOTE — OUTREACH NOTE
AMBULATORY CASE MANAGEMENT NOTE    Names and Relationships of Patient/Support Persons: Caller: MONICAISAÍAS; Relationship: Emergency Contact -     Patient Outreach    Spoke with patient's friend (health care surrogate) as a follow up call. Friend states patient is doing better. She denied diarrhea, falls. Reports patient is eating and drinking well with Megace. They have a walker, electric wheelchair, and HC accessible showers. 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 She has a good understanding of lab values. She requested blood work for patient on Mondays and Fridays. She would like for patient to have IV NS. Will message PCP.     Discussed CCM and areas that I assist with. Friend did not feel a follow up call was needed. CCM closed.       [place] MAYFIELD  Ambulatory Case Management    6/6/2024, 15:29 EDT

## 2024-06-07 LAB — VIT A SERPL-MCNC: 29.1 UG/DL (ref 22–69.5)

## 2024-06-10 ENCOUNTER — HOME HEALTH ADMISSION (OUTPATIENT)
Dept: HOME HEALTH SERVICES | Facility: HOME HEALTHCARE | Age: 74
End: 2024-06-10
Payer: MEDICARE

## 2024-06-10 ENCOUNTER — TELEPHONE (OUTPATIENT)
Dept: CASE MANAGEMENT | Facility: OTHER | Age: 74
End: 2024-06-10
Payer: MEDICARE

## 2024-06-10 DIAGNOSIS — Z78.9 FREQUENT HOSPITAL ADMISSIONS: Primary | ICD-10-CM

## 2024-06-10 DIAGNOSIS — R53.1 GENERALIZED WEAKNESS: ICD-10-CM

## 2024-06-10 DIAGNOSIS — N18.4 CHRONIC KIDNEY DISEASE, STAGE 4 (SEVERE): ICD-10-CM

## 2024-06-10 DIAGNOSIS — A04.72 C. DIFFICILE COLITIS: ICD-10-CM

## 2024-06-10 DIAGNOSIS — N18.4 STAGE 4 CHRONIC KIDNEY DISEASE: ICD-10-CM

## 2024-06-10 NOTE — TELEPHONE ENCOUNTER
Spoke with patient's friend as a follow up regarding lab work. Friend stated they do not need HH services as nephrology told them his renal function was stable. Explained that per message from PCP, nephrology would manage patient's renal panels and IV infusions if indicated. Friend states PCP will manage blood draws unless there is a problem with patient's renal function. Updated PCP regarding discussion with friend. Patient has a follow up with PCP tomorrow to continue these discussions.

## 2024-06-11 ENCOUNTER — LAB (OUTPATIENT)
Dept: LAB | Facility: HOSPITAL | Age: 74
End: 2024-06-11
Payer: MEDICARE

## 2024-06-11 ENCOUNTER — OFFICE VISIT (OUTPATIENT)
Dept: INTERNAL MEDICINE | Facility: CLINIC | Age: 74
End: 2024-06-11
Payer: MEDICARE

## 2024-06-11 ENCOUNTER — TELEPHONE (OUTPATIENT)
Dept: ONCOLOGY | Facility: CLINIC | Age: 74
End: 2024-06-11

## 2024-06-11 VITALS
HEART RATE: 88 BPM | TEMPERATURE: 97.3 F | SYSTOLIC BLOOD PRESSURE: 118 MMHG | OXYGEN SATURATION: 98 % | BODY MASS INDEX: 22.73 KG/M2 | HEIGHT: 72 IN | RESPIRATION RATE: 22 BRPM | WEIGHT: 167.8 LBS | DIASTOLIC BLOOD PRESSURE: 62 MMHG

## 2024-06-11 DIAGNOSIS — R53.1 GENERALIZED WEAKNESS: ICD-10-CM

## 2024-06-11 DIAGNOSIS — N18.4 CHRONIC KIDNEY DISEASE, STAGE 4 (SEVERE): ICD-10-CM

## 2024-06-11 DIAGNOSIS — N18.4 STAGE 4 CHRONIC KIDNEY DISEASE: ICD-10-CM

## 2024-06-11 DIAGNOSIS — R79.9 ABNORMAL FINDING OF BLOOD CHEMISTRY, UNSPECIFIED: ICD-10-CM

## 2024-06-11 DIAGNOSIS — A04.72 C. DIFFICILE COLITIS: ICD-10-CM

## 2024-06-11 DIAGNOSIS — N17.9 AKI (ACUTE KIDNEY INJURY): ICD-10-CM

## 2024-06-11 DIAGNOSIS — Z78.9 FREQUENT HOSPITAL ADMISSIONS: ICD-10-CM

## 2024-06-11 DIAGNOSIS — D72.819 LEUKOPENIA, UNSPECIFIED TYPE: ICD-10-CM

## 2024-06-11 DIAGNOSIS — Z09 ENCOUNTER FOR FOLLOW-UP: Primary | ICD-10-CM

## 2024-06-11 DIAGNOSIS — Z13.21 ENCOUNTER FOR VITAMIN DEFICIENCY SCREENING: ICD-10-CM

## 2024-06-11 DIAGNOSIS — E86.1 HYPOTENSION DUE TO HYPOVOLEMIA: ICD-10-CM

## 2024-06-11 LAB
DEPRECATED RDW RBC AUTO: 51.9 FL (ref 37–54)
ERYTHROCYTE [DISTWIDTH] IN BLOOD BY AUTOMATED COUNT: 15.6 % (ref 12.3–15.4)
FUNGUS WND CULT: NORMAL
HCT VFR BLD AUTO: 27.3 % (ref 37.5–51)
HGB BLD-MCNC: 8.9 G/DL (ref 13–17.7)
MCH RBC QN AUTO: 29.9 PG (ref 26.6–33)
MCHC RBC AUTO-ENTMCNC: 32.6 G/DL (ref 31.5–35.7)
MCV RBC AUTO: 91.6 FL (ref 79–97)
MYCOBACTERIUM SPEC CULT: NORMAL
NIGHT BLUE STAIN TISS: NORMAL
PLATELET # BLD AUTO: 458 10*3/MM3 (ref 140–450)
PMV BLD AUTO: 8.9 FL (ref 6–12)
RBC # BLD AUTO: 2.98 10*6/MM3 (ref 4.14–5.8)
WBC NRBC COR # BLD AUTO: 12.34 10*3/MM3 (ref 3.4–10.8)

## 2024-06-11 PROCEDURE — 82306 VITAMIN D 25 HYDROXY: CPT

## 2024-06-11 PROCEDURE — 3044F HG A1C LEVEL LT 7.0%: CPT | Performed by: NURSE PRACTITIONER

## 2024-06-11 PROCEDURE — 36415 COLL VENOUS BLD VENIPUNCTURE: CPT

## 2024-06-11 PROCEDURE — 1159F MED LIST DOCD IN RCRD: CPT | Performed by: NURSE PRACTITIONER

## 2024-06-11 PROCEDURE — 80053 COMPREHEN METABOLIC PANEL: CPT

## 2024-06-11 PROCEDURE — 1126F AMNT PAIN NOTED NONE PRSNT: CPT | Performed by: NURSE PRACTITIONER

## 2024-06-11 PROCEDURE — 83540 ASSAY OF IRON: CPT

## 2024-06-11 PROCEDURE — 3074F SYST BP LT 130 MM HG: CPT | Performed by: NURSE PRACTITIONER

## 2024-06-11 PROCEDURE — 83735 ASSAY OF MAGNESIUM: CPT

## 2024-06-11 PROCEDURE — 82180 ASSAY OF ASCORBIC ACID: CPT

## 2024-06-11 PROCEDURE — 85027 COMPLETE CBC AUTOMATED: CPT | Performed by: NURSE PRACTITIONER

## 2024-06-11 PROCEDURE — 1160F RVW MEDS BY RX/DR IN RCRD: CPT | Performed by: NURSE PRACTITIONER

## 2024-06-11 PROCEDURE — 99214 OFFICE O/P EST MOD 30 MIN: CPT | Performed by: NURSE PRACTITIONER

## 2024-06-11 PROCEDURE — 3078F DIAST BP <80 MM HG: CPT | Performed by: NURSE PRACTITIONER

## 2024-06-11 NOTE — TELEPHONE ENCOUNTER
Caller: ISAÍAS ANDERSON    Relationship to patient: Emergency Contact    Best call back number: [phone]    Chief complaint: RESCHEDULE    Type of visit: FOLLOW UP    Requested date: AFTER PET SCAN 6-20      If rescheduling, when is the original appointment: 6-19     Additional notes:PLEASE ADVISE

## 2024-06-11 NOTE — PROGRESS NOTES
Office Note     Name: David Barfield    : 1950     MRN: [id number]     Chief Complaint  Follow-up    Subjective     History of Present Illness:  David Barfield is a 74 y.o. male who presents today for follow-up    Patient is accompanied by his significant other today.    We did provide some clarification regarding recent concerns from our .  We did discuss that fluid management right now would not be appropriate as patient does have chronic kidney disease.  Significant other would still like updated labs including CBC, comprehensive metabolic panel, magnesium, iron, potassium, vitamin D.  Patient states he is doing much better.  He recently saw nephrology and infectious disease.  Nephrology did increase his medication to half tablet twice daily and blood pressures have been better.  Patient has been able to travel.  He did go with his significant other up to [place] for her follow-up.  Patient is only taking vancomycin once daily.  Again he is feeling much stronger and has gained some weight.      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    Coronary artery disease 2019    Diabetes mellitus Borderline    Emphysema/COPD     Erectile disorder     GERD (gastroesophageal reflux disease)     History of chemotherapy     Hyperlipidemia     Hypertension 2019    Lung nodule     Mumps     Mumps     Pruritus     after bath    Slow to wake up after anesthesia     Stage 5 chronic kidney disease not on chronic dialysis 2024    Wears dentures     upper only    Wears hearing aid in both ears     usually only wears right       Past Surgical History:   Procedure Laterality Date    BONE BIOPSY      broken bone surgery in his face    BRONCHOSCOPY THORACOTOMY Right 2024    Procedure: THORACOTOMY FOR LOWER LOBECTOMY AND MEDISTINAL LYMPH NODE DISSECTION RIGHT;  Surgeon: Jorge  Joey POWELL MD;  Location:  [facility];  Service: Cardiothoracic;  Laterality: Right;    BRONCHOSCOPY WITH ION ROBOTIC ASSIST N/A 09/15/2023    Procedure: BRONCHOSCOPY NAVIGATION WITH ENDOBRONCHIAL ULTRASOUND AND ION ROBOT;  Surgeon: Octaviano Sampson MD;  Location:  [facility];  Service: Robotics - Pulmonary;  Laterality: N/A;  ion #6 - 0032  - 0015  Cath guide 0061    EBUS balloon removed and intact    CARDIAC ELECTROPHYSIOLOGY PROCEDURE N/A 08/17/2021    Procedure: Pacemaker DC new;  Surgeon: Kayy Box MD;  Location:  [facility];  Service: Cardiology;  Laterality: N/A;    FACIAL FRACTURE SURGERY      LYMPH NODE BIOPSY  2023    PACEMAKER IMPLANTATION         Social History     Socioeconomic History    Marital status: Single    Number of children: 3   Tobacco Use    Smoking status: Every Day     Current packs/day: 0.50     Average packs/day: 0.5 packs/day for 56.4 years (28.7 ttl pk-yrs)     Types: Cigarettes     Start date: 1/1/1968    Smokeless tobacco: Never    Tobacco comments:     Still smoke   Vaping Use    Vaping status: Never Used   Substance and Sexual Activity    Alcohol use: Never    Drug use: Never    Sexual activity: Yes     Partners: Female     Birth control/protection: None         Current Outpatient Medications:     albuterol sulfate  (90 Base) MCG/ACT inhaler, Inhale 2 puffs Every 4 (Four) Hours As Needed for Wheezing., Disp: 18 g, Rfl: 11    B Complex Vitamins (vitamin b complex) capsule capsule, Take 1 capsule by mouth Daily. OTC, Disp: , Rfl:     ferrous sulfate 325 (65 FE) MG tablet, Take 1 tablet by mouth Daily With Breakfast. OTC, Disp: , Rfl:     fludrocortisone 0.1 MG tablet, Take 0.5 (one-half) tablet by mouth Daily for 30 days., Disp: 15 tablet, Rfl: 0    fluticasone (VERAMYST) 27.5 MCG/SPRAY nasal spray, 2 sprays into the nostril(s) as directed by provider 1 (One) Time As Needed for Rhinitis or Allergies., Disp: 6 mL, Rfl: 2     "Fluticasone-Umeclidin-Vilant (Trelegy Ellipta) 100-62.5-25 MCG/ACT inhaler, Inhale 1 puff Daily., Disp: 3 each, Rfl: 3    HYDROcodone-acetaminophen (Norco) 7.5-325 MG per tablet, Take 1 tablet by mouth Every 6 (Six) Hours As Needed for Moderate Pain., Disp: 60 tablet, Rfl: 0    lidocaine-prilocaine (EMLA) 2.5-2.5 % cream, Apply 1 application  topically to the appropriate area as directed As Needed (45-60 minutes prior to port access.  Cover with saran/plastic wrap.)., Disp: 30 g, Rfl: 3    magnesium chloride ER 64 MG DR tablet, Take 1 tablet by mouth Daily., Disp: , Rfl:     megestrol (MEGACE) 40 MG tablet, Take 0.5 (one-half) tablet (20mg) by mouth Daily for 30 days., Disp: 15 tablet, Rfl: 0    midodrine (PROAMATINE) 10 MG tablet, Take 1 tablet by mouth 3 (Three) Times a Day As Needed (Systolic BP less then 100)., Disp: 30 tablet, Rfl: 0    omeprazole (priLOSEC) 40 MG capsule, Take 1 capsule by mouth Daily., Disp: 30 capsule, Rfl: 5    ondansetron (ZOFRAN) 8 MG tablet, Take 1 tablet by mouth 3 (Three) Times a Day As Needed for Nausea or Vomiting., Disp: 30 tablet, Rfl: 2    pravastatin (PRAVACHOL) 80 MG tablet, Take 1 tablet by mouth Every Night., Disp: 30 tablet, Rfl: 11    sodium bicarbonate 650 MG tablet, Take 2 tablets by mouth 3 (Three) Times a Day for 30 days., Disp: 180 tablet, Rfl: 0    vancomycin (VANCOCIN) 125 MG capsule, Take 1 capsule by mouth Every 6 Hours for 7 days; THEN 1 capsule 2 Times a Day for 7 days; THEN 1 capsule once Daily for 7 days., Disp: 49 capsule, Rfl: 0    Objective     Vital Signs  /62   Pulse 88   Temp 97.3 °F (36.3 °C) (Temporal)   Resp 22   Ht 182.9 cm (72.01\")   Wt 76.1 kg (167 lb 12.8 oz)   SpO2 98%   BMI 22.75 kg/m²   Estimated body mass index is 22.75 kg/m² as calculated from the following:    Height as of this encounter: 182.9 cm (72.01\").    Weight as of this encounter: 76.1 kg (167 lb 12.8 oz).    BMI is within normal parameters. No other follow-up for BMI " required.        Physical Exam  Vitals and nursing note reviewed.   Constitutional:       Appearance: Normal appearance.      Comments: Patient accompanied by significant other today.  Patient appears to be feeling well   HENT:      Head: Normocephalic and atraumatic.   Eyes:      Extraocular Movements: Extraocular movements intact.      Pupils: Pupils are equal, round, and reactive to light.   Cardiovascular:      Rate and Rhythm: Normal rate and regular rhythm.   Pulmonary:      Effort: Pulmonary effort is normal.   Musculoskeletal:         General: Normal range of motion.   Skin:     General: Skin is warm and dry.   Neurological:      Mental Status: He is alert and oriented to person, place, and time.   Psychiatric:         Mood and Affect: Mood normal.         Behavior: Behavior normal.                 Assessment and Plan     Diagnoses and all orders for this visit:    1. Encounter for follow-up (Primary)    2. Frequent hospital admissions    3. Stage 4 chronic kidney disease  -     CBC (No Diff)  -     Comprehensive Metabolic Panel; Future    4. Chronic kidney disease, stage 4 (severe)  -     Vitamin D,25-Hydroxy; Future    5. Generalized weakness  -     Magnesium; Future  -     Vitamin D,25-Hydroxy; Future  -     Iron; Future    6. Hypotension due to hypovolemia    7. Encounter for vitamin deficiency screening  -     Magnesium; Future  -     Vitamin D,25-Hydroxy; Future  -     Iron; Future    8. Abnormal finding of blood chemistry, unspecified  -     Iron; Future    Plan  Follow-up visit completed with patient today    Clarification provided after recent misunderstanding with our .  We did discuss that fluid management would not be appropriate at this time due to his end-stage renal disease.  They would need to confirm with nephrology  Twice weekly lab work would also not be appropriate.  Only intermittent labs would be appropriate.    Patient is of sound mind and I did discuss with him that he is  my first priority.  We will make sure to follow what ever plan of care that he feels appropriate.  He is welcome to bring other family members as well as his children to the appointments if they do have any further questions or concerns.  Patient did voiced understanding to this    Significant other also agreed with the above information    Labs were ordered at their request.  They will be notified of results as they return    Continue to work towards gaining strength and eating a well-rounded diet    Go to ER if any condition worsens or severe    Plan to follow-up as scheduled in June on the 18th    Follow Up  Return for As scheduled on June 18.    ANAMIKA Appiah    Part of this note may be an electronic transcription/translation of spoken language to printed text using the Dragon Dictation System.

## 2024-06-12 ENCOUNTER — TELEPHONE (OUTPATIENT)
Dept: INTERNAL MEDICINE | Facility: CLINIC | Age: 74
End: 2024-06-12

## 2024-06-12 ENCOUNTER — TELEPHONE (OUTPATIENT)
Dept: INTERNAL MEDICINE | Facility: CLINIC | Age: 74
End: 2024-06-12
Payer: MEDICARE

## 2024-06-12 LAB
25(OH)D3 SERPL-MCNC: 25.1 NG/ML (ref 30–100)
ALBUMIN SERPL-MCNC: 3.7 G/DL (ref 3.5–5.2)
ALBUMIN/GLOB SERPL: 0.9 G/DL
ALP SERPL-CCNC: 87 U/L (ref 39–117)
ALT SERPL W P-5'-P-CCNC: 34 U/L (ref 1–41)
ANION GAP SERPL CALCULATED.3IONS-SCNC: 12 MMOL/L (ref 5–15)
AST SERPL-CCNC: 20 U/L (ref 1–40)
BILIRUB SERPL-MCNC: <0.2 MG/DL (ref 0–1.2)
BUN SERPL-MCNC: 21 MG/DL (ref 8–23)
BUN/CREAT SERPL: 9.2 (ref 7–25)
CALCIUM SPEC-SCNC: 9.2 MG/DL (ref 8.6–10.5)
CHLORIDE SERPL-SCNC: 104 MMOL/L (ref 98–107)
CO2 SERPL-SCNC: 22 MMOL/L (ref 22–29)
CREAT SERPL-MCNC: 2.29 MG/DL (ref 0.76–1.27)
EGFRCR SERPLBLD CKD-EPI 2021: 29 ML/MIN/1.73
GLOBULIN UR ELPH-MCNC: 3.9 GM/DL
GLUCOSE SERPL-MCNC: 100 MG/DL (ref 65–99)
IRON 24H UR-MRATE: 50 MCG/DL (ref 59–158)
MAGNESIUM SERPL-MCNC: 2.2 MG/DL (ref 1.6–2.4)
POTASSIUM SERPL-SCNC: 3.4 MMOL/L (ref 3.5–5.2)
PROT SERPL-MCNC: 7.6 G/DL (ref 6–8.5)
SODIUM SERPL-SCNC: 138 MMOL/L (ref 136–145)

## 2024-06-12 NOTE — TELEPHONE ENCOUNTER
Caller: VANESSA    Relationship: Home Health    Best call back number: [phone]       What was the [facility] regarding[facility] SAID HE DOES NOT NEED HOME HEALTH SO THEY WILL NOT BE SEEING HIM.    Is it okay if the provider responds through MyChart:

## 2024-06-12 NOTE — TELEPHONE ENCOUNTER
Called and spoke to pt and his friend.  Gave message from provider. Pt and friend voiced understanding and appreciation.     Pt and friend wanted to make sure that he was still he was still ok for him to continue to his magnesium?   Vitamin D is still low - continue taking?   Iron still low - still taking?   BMP not received? Did you order?   Should pt also take Vitamin B to help build up Red Blood cells?   Potassium- continue taking?   Platelets are going up - what is causing this?     ----- Message from Hayley Castellanos sent at 6/12/2024  8:15 AM EDT -----  Please let patient and family member know that labs resulted there still are some noted changes on his labs.  I will also make sure to send a copy of the labs to other specialists

## 2024-06-13 ENCOUNTER — TELEPHONE (OUTPATIENT)
Dept: INTERNAL MEDICINE | Facility: CLINIC | Age: 74
End: 2024-06-13
Payer: MEDICARE

## 2024-06-13 RX ORDER — FLUDROCORTISONE ACETATE 0.1 MG/1
0.05 TABLET ORAL DAILY
Qty: 15 TABLET | Refills: 0 | OUTPATIENT
Start: 2024-06-13 | End: 2024-07-13

## 2024-06-13 RX ORDER — MEGESTROL ACETATE 40 MG/1
20 TABLET ORAL DAILY
Qty: 15 TABLET | Refills: 0 | OUTPATIENT
Start: 2024-06-13 | End: 2024-07-13

## 2024-06-13 NOTE — TELEPHONE ENCOUNTER
Caller: ISAÍAS ANDERSON    Relationship: Emergency Contact    Best call back number:      Requested Prescriptions:   Requested Prescriptions     Pending Prescriptions Disp Refills    fludrocortisone 0.1 MG tablet 15 tablet 0     Sig: Take 0.5 (one-half) tablet by mouth Daily for 30 days.    megestrol (MEGACE) 40 MG tablet 15 tablet 0     Sig: Take 0.5 (one-half) tablet (20mg) by mouth Daily for 30 days.        Pharmacy where request should be sent: [id number] [address]  AT [facility] - [phone] [facility] [phone] FX     Last office visit with prescribing clinician: 6/11/2024   Last telemedicine visit with prescribing clinician: Visit date not found   Next office visit with prescribing clinician: 6/18/2024     Additional details provided by patient: PATIENT WILL BE OUT OF MEDICATION BY TOMORROW; ALSO ON THE MEGACE HE TAKES THIS TWICE DAILY AND THAT IS WHY HE HAS RUN OUT; ALSO THESE MEDICATIONS WERE PRESCRIBED BY THE HOSPITALIST, LAWSON RODRIGUEZ; ALSO ON THE  FLUDROCORTISONE, HE TAKES HALF IN THE MORNING AND HALF AT NIGHT    Does the patient have less than a 3 day supply:  [x] Yes  [] No    Would you like a call back once the refill request has been completed: [] Yes [] No    Lila Covarrubias   06/13/24 11:00 EDT

## 2024-06-15 LAB — VIT C SERPL-MCNC: 0.5 MG/DL (ref 0.4–2)

## 2024-06-18 ENCOUNTER — LAB (OUTPATIENT)
Dept: LAB | Facility: HOSPITAL | Age: 74
End: 2024-06-18
Payer: MEDICARE

## 2024-06-18 ENCOUNTER — OFFICE VISIT (OUTPATIENT)
Dept: INTERNAL MEDICINE | Facility: CLINIC | Age: 74
End: 2024-06-18
Payer: MEDICARE

## 2024-06-18 VITALS
OXYGEN SATURATION: 97 % | RESPIRATION RATE: 18 BRPM | HEART RATE: 70 BPM | BODY MASS INDEX: 22.37 KG/M2 | TEMPERATURE: 97.8 F | SYSTOLIC BLOOD PRESSURE: 122 MMHG | HEIGHT: 72 IN | DIASTOLIC BLOOD PRESSURE: 66 MMHG | WEIGHT: 165.2 LBS

## 2024-06-18 DIAGNOSIS — R53.1 GENERALIZED WEAKNESS: ICD-10-CM

## 2024-06-18 DIAGNOSIS — E78.2 MIXED HYPERLIPIDEMIA: ICD-10-CM

## 2024-06-18 DIAGNOSIS — N18.4 STAGE 4 CHRONIC KIDNEY DISEASE: ICD-10-CM

## 2024-06-18 DIAGNOSIS — Z09 ENCOUNTER FOR FOLLOW-UP: Primary | ICD-10-CM

## 2024-06-18 LAB
BASOPHILS # BLD AUTO: 0.03 10*3/MM3 (ref 0–0.2)
BASOPHILS NFR BLD AUTO: 0.3 % (ref 0–1.5)
DEPRECATED RDW RBC AUTO: 54.5 FL (ref 37–54)
EOSINOPHIL # BLD AUTO: 0 10*3/MM3 (ref 0–0.4)
EOSINOPHIL NFR BLD AUTO: 0 % (ref 0.3–6.2)
ERYTHROCYTE [DISTWIDTH] IN BLOOD BY AUTOMATED COUNT: 15.9 % (ref 12.3–15.4)
FUNGUS WND CULT: NORMAL
HCT VFR BLD AUTO: 28.9 % (ref 37.5–51)
HGB BLD-MCNC: 9.3 G/DL (ref 13–17.7)
IMM GRANULOCYTES # BLD AUTO: 0.03 10*3/MM3 (ref 0–0.05)
IMM GRANULOCYTES NFR BLD AUTO: 0.3 % (ref 0–0.5)
LYMPHOCYTES # BLD AUTO: 1.84 10*3/MM3 (ref 0.7–3.1)
LYMPHOCYTES NFR BLD AUTO: 18.3 % (ref 19.6–45.3)
MCH RBC QN AUTO: 30.2 PG (ref 26.6–33)
MCHC RBC AUTO-ENTMCNC: 32.2 G/DL (ref 31.5–35.7)
MCV RBC AUTO: 93.8 FL (ref 79–97)
MONOCYTES # BLD AUTO: 0.88 10*3/MM3 (ref 0.1–0.9)
MONOCYTES NFR BLD AUTO: 8.7 % (ref 5–12)
MYCOBACTERIUM SPEC CULT: NORMAL
NEUTROPHILS NFR BLD AUTO: 7.3 10*3/MM3 (ref 1.7–7)
NEUTROPHILS NFR BLD AUTO: 72.4 % (ref 42.7–76)
NIGHT BLUE STAIN TISS: NORMAL
NRBC BLD AUTO-RTO: 0 /100 WBC (ref 0–0.2)
PLATELET # BLD AUTO: 362 10*3/MM3 (ref 140–450)
PMV BLD AUTO: 9.2 FL (ref 6–12)
RBC # BLD AUTO: 3.08 10*6/MM3 (ref 4.14–5.8)
WBC NRBC COR # BLD AUTO: 10.08 10*3/MM3 (ref 3.4–10.8)

## 2024-06-18 PROCEDURE — 99213 OFFICE O/P EST LOW 20 MIN: CPT | Performed by: NURSE PRACTITIONER

## 2024-06-18 PROCEDURE — 3044F HG A1C LEVEL LT 7.0%: CPT | Performed by: NURSE PRACTITIONER

## 2024-06-18 PROCEDURE — 3078F DIAST BP <80 MM HG: CPT | Performed by: NURSE PRACTITIONER

## 2024-06-18 PROCEDURE — 1159F MED LIST DOCD IN RCRD: CPT | Performed by: NURSE PRACTITIONER

## 2024-06-18 PROCEDURE — 85025 COMPLETE CBC W/AUTO DIFF WBC: CPT

## 2024-06-18 PROCEDURE — 80048 BASIC METABOLIC PNL TOTAL CA: CPT

## 2024-06-18 PROCEDURE — 3074F SYST BP LT 130 MM HG: CPT | Performed by: NURSE PRACTITIONER

## 2024-06-18 PROCEDURE — 1160F RVW MEDS BY RX/DR IN RCRD: CPT | Performed by: NURSE PRACTITIONER

## 2024-06-18 PROCEDURE — 1126F AMNT PAIN NOTED NONE PRSNT: CPT | Performed by: NURSE PRACTITIONER

## 2024-06-18 RX ORDER — PRAVASTATIN SODIUM 80 MG/1
80 TABLET ORAL NIGHTLY
Qty: 90 TABLET | Refills: 1 | Status: SHIPPED | OUTPATIENT
Start: 2024-06-18

## 2024-06-18 NOTE — Clinical Note
Patient had a question regarding whether or not he will be on the fludrocortisone lifelong.  Discussed that I was unsure how to appropriately answer that question.  Do either of you have any insights?  I believe it may have been cardiology that started him on it while he was in the hospital to help with blood pressure management.

## 2024-06-18 NOTE — PROGRESS NOTES
Office Note     Name: David Barfield    : 1950     MRN: [id number]     Chief Complaint  Follow-up (Needs prescription for cholesterol medication)    Subjective     History of Present Illness:  David Barfield is a 74 y.o. male who presents today for follow-up    Patient was last seen on .  Labs were obtained at that time.  It was notified in a telephone encounter that we will hold on rechecking his iron and vitamin D as it does take a few months for those levels to regulate.    Patient significant other was with him today.    They would like to have a continue to recheck of his kidneys as well as blood work.    He does need a refill of his pravastatin.    [facility]  He would ideally like to transition his care over to here.    Patient and significant other are aware of the upcoming PET scan as well as follow-up with hematology oncology after that.  He is also aware of his upcoming appointment with cardiology.      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    Coronary artery disease 2019    Diabetes mellitus Borderline    Emphysema/COPD     Erectile disorder     GERD (gastroesophageal reflux disease)     History of chemotherapy     Hyperlipidemia     Hypertension 2019    Lung nodule     Mumps     Mumps     Pruritus     after bath    Slow to wake up after anesthesia     Stage 5 chronic kidney disease not on chronic dialysis 2024    Wears dentures     upper only    Wears hearing aid in both ears     usually only wears right       Past Surgical History:   Procedure Laterality Date    BONE BIOPSY      broken bone surgery in his face    BRONCHOSCOPY THORACOTOMY Right 2024    Procedure: THORACOTOMY FOR LOWER LOBECTOMY AND MEDISTINAL LYMPH NODE DISSECTION RIGHT;  Surgeon: Joey Patel MD;  Location: [facility];  Service: Cardiothoracic;   Laterality: Right;    BRONCHOSCOPY WITH ION ROBOTIC ASSIST N/A 09/15/2023    Procedure: BRONCHOSCOPY NAVIGATION WITH ENDOBRONCHIAL ULTRASOUND AND ION ROBOT;  Surgeon: Octaviano Sampson MD;  Location:  [facility];  Service: Robotics - Pulmonary;  Laterality: N/A;  ion #6 - 0032  - 0015  Cath guide 0061    EBUS balloon removed and intact    CARDIAC ELECTROPHYSIOLOGY PROCEDURE N/A 08/17/2021    Procedure: Pacemaker DC new;  Surgeon: Kayy Box MD;  Location:  [facility];  Service: Cardiology;  Laterality: N/A;    FACIAL FRACTURE SURGERY      LYMPH NODE BIOPSY  2023    PACEMAKER IMPLANTATION         Social History     Socioeconomic History    Marital status: Single    Number of children: 3   Tobacco Use    Smoking status: Every Day     Current packs/day: 0.50     Average packs/day: 0.5 packs/day for 56.5 years (28.7 ttl pk-yrs)     Types: Cigarettes     Start date: 1/1/1968    Smokeless tobacco: Never    Tobacco comments:     Still smoke   Vaping Use    Vaping status: Never Used   Substance and Sexual Activity    Alcohol use: Never    Drug use: Never    Sexual activity: Yes     Partners: Female     Birth control/protection: None         Current Outpatient Medications:     pravastatin (PRAVACHOL) 80 MG tablet, Take 1 tablet by mouth Every Night., Disp: 90 tablet, Rfl: 1    albuterol sulfate  (90 Base) MCG/ACT inhaler, Inhale 2 puffs Every 4 (Four) Hours As Needed for Wheezing., Disp: 18 g, Rfl: 11    B Complex Vitamins (vitamin b complex) capsule capsule, Take 1 capsule by mouth Daily. OTC, Disp: , Rfl:     ferrous sulfate 325 (65 FE) MG tablet, Take 1 tablet by mouth Daily With Breakfast. OTC, Disp: , Rfl:     fludrocortisone 0.1 MG tablet, Take 0.5 (one-half) tablet by mouth Daily for 30 days., Disp: 15 tablet, Rfl: 0    fluticasone (VERAMYST) 27.5 MCG/SPRAY nasal spray, 2 sprays into the nostril(s) as directed by provider 1 (One) Time As Needed for Rhinitis or Allergies.,  "Disp: 6 mL, Rfl: 2    Fluticasone-Umeclidin-Vilant (Trelegy Ellipta) 100-62.5-25 MCG/ACT inhaler, Inhale 1 puff Daily., Disp: 3 each, Rfl: 3    HYDROcodone-acetaminophen (Norco) 7.5-325 MG per tablet, Take 1 tablet by mouth Every 6 (Six) Hours As Needed for Moderate Pain., Disp: 60 tablet, Rfl: 0    lidocaine-prilocaine (EMLA) 2.5-2.5 % cream, Apply 1 application  topically to the appropriate area as directed As Needed (45-60 minutes prior to port access.  Cover with saran/plastic wrap.)., Disp: 30 g, Rfl: 3    magnesium chloride ER 64 MG DR tablet, Take 1 tablet by mouth Daily., Disp: , Rfl:     megestrol (MEGACE) 40 MG tablet, Take 0.5 (one-half) tablet (20mg) by mouth Daily for 30 days., Disp: 15 tablet, Rfl: 0    midodrine (PROAMATINE) 10 MG tablet, Take 1 tablet by mouth 3 (Three) Times a Day As Needed (Systolic BP less then 100)., Disp: 30 tablet, Rfl: 0    omeprazole (priLOSEC) 40 MG capsule, Take 1 capsule by mouth Daily., Disp: 30 capsule, Rfl: 5    ondansetron (ZOFRAN) 8 MG tablet, Take 1 tablet by mouth 3 (Three) Times a Day As Needed for Nausea or Vomiting., Disp: 30 tablet, Rfl: 2    sodium bicarbonate 650 MG tablet, Take 2 tablets by mouth 3 (Three) Times a Day for 30 days., Disp: 180 tablet, Rfl: 0    vancomycin (VANCOCIN) 125 MG capsule, Take 1 capsule by mouth Every 6 Hours for 7 days; THEN 1 capsule 2 Times a Day for 7 days; THEN 1 capsule once Daily for 7 days., Disp: 49 capsule, Rfl: 0    Objective     Vital Signs  /66   Pulse 70   Temp 97.8 °F (36.6 °C) (Temporal)   Resp 18   Ht 182.9 cm (72.01\")   Wt 74.9 kg (165 lb 3.2 oz)   SpO2 97%   BMI 22.40 kg/m²   Estimated body mass index is 22.4 kg/m² as calculated from the following:    Height as of this encounter: 182.9 cm (72.01\").    Weight as of this encounter: 74.9 kg (165 lb 3.2 oz).    BMI is within normal parameters. No other follow-up for BMI required.             Physical Exam  Vitals and nursing note reviewed.   Constitutional: "       Appearance: Normal appearance.      Comments: Accompanied by significant other today.  Patient appears she is doing well   HENT:      Head: Normocephalic and atraumatic.   Eyes:      Extraocular Movements: Extraocular movements intact.      Pupils: Pupils are equal, round, and reactive to light.   Cardiovascular:      Rate and Rhythm: Normal rate and regular rhythm.   Pulmonary:      Effort: Pulmonary effort is normal.   Musculoskeletal:         General: Normal range of motion.   Skin:     General: Skin is warm and dry.   Neurological:      Mental Status: He is alert and oriented to person, place, and time.   Psychiatric:         Mood and Affect: Mood normal.         Behavior: Behavior normal.                 Assessment and Plan     Diagnoses and all orders for this visit:    1. Encounter for follow-up (Primary)    2. Mixed hyperlipidemia  -     pravastatin (PRAVACHOL) 80 MG tablet; Take 1 tablet by mouth Every Night.  Dispense: 90 tablet; Refill: 1    3. Stage 4 chronic kidney disease  -     CBC & Differential; Future  -     Basic Metabolic Panel; Future    4. Generalized weakness  -     CBC & Differential; Future  -     Basic Metabolic Panel; Future    Plan  Very pleasant follow-up visit with patient and significant other today    Refill of pravastatin 90 days with 1 refill sent to the pharmacy    Patient will have an updated CBC with differential and basic metabolic panel per their request today.  We discussed we can hold off on the vitamins as it will take a few months for these to regulate.    Go to ER if any condition worsens or severe    Plan to follow-up as scheduled next week    Follow Up  Return for Next scheduled follow up.    ANAMIKA Appiah    Part of this note may be an electronic transcription/translation of spoken language to printed text using the Dragon Dictation System.

## 2024-06-19 ENCOUNTER — TELEPHONE (OUTPATIENT)
Dept: INTERNAL MEDICINE | Facility: CLINIC | Age: 74
End: 2024-06-19
Payer: MEDICARE

## 2024-06-19 LAB
ANION GAP SERPL CALCULATED.3IONS-SCNC: 13.1 MMOL/L (ref 5–15)
BUN SERPL-MCNC: 15 MG/DL (ref 8–23)
BUN/CREAT SERPL: 7.8 (ref 7–25)
CALCIUM SPEC-SCNC: 9.4 MG/DL (ref 8.6–10.5)
CHLORIDE SERPL-SCNC: 108 MMOL/L (ref 98–107)
CO2 SERPL-SCNC: 18.9 MMOL/L (ref 22–29)
CREAT SERPL-MCNC: 1.92 MG/DL (ref 0.76–1.27)
EGFRCR SERPLBLD CKD-EPI 2021: 35.9 ML/MIN/1.73
GLUCOSE SERPL-MCNC: 88 MG/DL (ref 65–99)
POTASSIUM SERPL-SCNC: 4.2 MMOL/L (ref 3.5–5.2)
SODIUM SERPL-SCNC: 140 MMOL/L (ref 136–145)

## 2024-06-19 NOTE — TELEPHONE ENCOUNTER
----- Message from Hayley Castellanos sent at 6/19/2024  8:33 AM EDT -----  Please let patient know labs resulted  It did show improvement from previous.  Kidney function is still noted to be altered.  Blood counts are also still altered but improved from previous.    Please send a copy to infectious disease and nephrology

## 2024-06-20 ENCOUNTER — READMISSION MANAGEMENT (OUTPATIENT)
Dept: CALL CENTER | Facility: HOSPITAL | Age: 74
End: 2024-06-20
Payer: MEDICARE

## 2024-06-20 ENCOUNTER — HOSPITAL ENCOUNTER (OUTPATIENT)
Facility: HOSPITAL | Age: 74
Discharge: HOME | End: 2024-06-20
Payer: MEDICARE

## 2024-06-20 DIAGNOSIS — C34.31 MALIGNANT NEOPLASM OF LOWER LOBE OF RIGHT LUNG: ICD-10-CM

## 2024-06-20 LAB — GLUCOSE BLDC GLUCOMTR-MCNC: 94 MG/DL (ref 70–130)

## 2024-06-20 PROCEDURE — A9552 F18 FDG: HCPCS | Performed by: INTERNAL MEDICINE

## 2024-06-20 PROCEDURE — 82948 REAGENT STRIP/BLOOD GLUCOSE: CPT

## 2024-06-20 PROCEDURE — 0 FLUDEOXYGLUCOSE F18 SOLUTION: Performed by: INTERNAL MEDICINE

## 2024-06-20 PROCEDURE — 78815 PET IMAGE W/CT SKULL-THIGH: CPT

## 2024-06-20 RX ADMIN — FLUDEOXYGLUCOSE F18 1 DOSE: 300 INJECTION INTRAVENOUS at 11:04

## 2024-06-20 NOTE — OUTREACH NOTE
Medical Week 3 Survey      Flowsheet Row Responses   [facility] patient discharged from? [place]   Does the patient have one of the following disease processes/diagnoses(primary or secondary)? Other   Week 3 attempt successful? Yes   Call start time 1219   Call end time 1221   Meds reviewed with patient/caregiver? Yes   Is the patient taking all medications as directed (includes completed medication regime)? Yes   Does the patient have a primary care provider?  Yes   Has the patient kept scheduled appointments due by today? Yes   What is the patient's perception of their health status since discharge? Improving   Week 3 Call Completed? Yes   Graduated Yes   Wrap up additional comments Pt reports feeling well. Pt has had pcp f/u/   Call end time 1221            Malini ENRIQUEZ - Registered Nurse

## 2024-06-24 ENCOUNTER — OFFICE VISIT (OUTPATIENT)
Dept: INTERNAL MEDICINE | Facility: CLINIC | Age: 74
End: 2024-06-24
Payer: MEDICARE

## 2024-06-24 ENCOUNTER — LAB (OUTPATIENT)
Dept: LAB | Facility: HOSPITAL | Age: 74
End: 2024-06-24
Payer: MEDICARE

## 2024-06-24 VITALS
WEIGHT: 164.8 LBS | HEART RATE: 86 BPM | RESPIRATION RATE: 22 BRPM | OXYGEN SATURATION: 98 % | BODY MASS INDEX: 22.32 KG/M2 | TEMPERATURE: 97.3 F | SYSTOLIC BLOOD PRESSURE: 124 MMHG | DIASTOLIC BLOOD PRESSURE: 62 MMHG | HEIGHT: 72 IN

## 2024-06-24 DIAGNOSIS — N18.4 STAGE 4 CHRONIC KIDNEY DISEASE: ICD-10-CM

## 2024-06-24 DIAGNOSIS — R53.1 GENERALIZED WEAKNESS: ICD-10-CM

## 2024-06-24 DIAGNOSIS — Z78.9 FREQUENT HOSPITAL ADMISSIONS: ICD-10-CM

## 2024-06-24 DIAGNOSIS — Z09 ENCOUNTER FOR FOLLOW-UP: Primary | ICD-10-CM

## 2024-06-24 LAB
ANION GAP SERPL CALCULATED.3IONS-SCNC: 11.1 MMOL/L (ref 5–15)
BUN SERPL-MCNC: 18 MG/DL (ref 8–23)
BUN/CREAT SERPL: 10.3 (ref 7–25)
CALCIUM SPEC-SCNC: 9.3 MG/DL (ref 8.6–10.5)
CHLORIDE SERPL-SCNC: 107 MMOL/L (ref 98–107)
CO2 SERPL-SCNC: 19.9 MMOL/L (ref 22–29)
CREAT SERPL-MCNC: 1.75 MG/DL (ref 0.76–1.27)
DEPRECATED RDW RBC AUTO: 54.3 FL (ref 37–54)
EGFRCR SERPLBLD CKD-EPI 2021: 40.1 ML/MIN/1.73
ERYTHROCYTE [DISTWIDTH] IN BLOOD BY AUTOMATED COUNT: 15.7 % (ref 12.3–15.4)
GLUCOSE SERPL-MCNC: 81 MG/DL (ref 65–99)
HCT VFR BLD AUTO: 29.9 % (ref 37.5–51)
HGB BLD-MCNC: 9.6 G/DL (ref 13–17.7)
MCH RBC QN AUTO: 30.2 PG (ref 26.6–33)
MCHC RBC AUTO-ENTMCNC: 32.1 G/DL (ref 31.5–35.7)
MCV RBC AUTO: 94 FL (ref 79–97)
PLATELET # BLD AUTO: 345 10*3/MM3 (ref 140–450)
PMV BLD AUTO: 9 FL (ref 6–12)
POTASSIUM SERPL-SCNC: 4.1 MMOL/L (ref 3.5–5.2)
RBC # BLD AUTO: 3.18 10*6/MM3 (ref 4.14–5.8)
SODIUM SERPL-SCNC: 138 MMOL/L (ref 136–145)
WBC NRBC COR # BLD AUTO: 11.68 10*3/MM3 (ref 3.4–10.8)

## 2024-06-24 PROCEDURE — 80048 BASIC METABOLIC PNL TOTAL CA: CPT

## 2024-06-24 PROCEDURE — 3044F HG A1C LEVEL LT 7.0%: CPT | Performed by: NURSE PRACTITIONER

## 2024-06-24 PROCEDURE — 1126F AMNT PAIN NOTED NONE PRSNT: CPT | Performed by: NURSE PRACTITIONER

## 2024-06-24 PROCEDURE — 1159F MED LIST DOCD IN RCRD: CPT | Performed by: NURSE PRACTITIONER

## 2024-06-24 PROCEDURE — 85027 COMPLETE CBC AUTOMATED: CPT

## 2024-06-24 PROCEDURE — 3074F SYST BP LT 130 MM HG: CPT | Performed by: NURSE PRACTITIONER

## 2024-06-24 PROCEDURE — 1160F RVW MEDS BY RX/DR IN RCRD: CPT | Performed by: NURSE PRACTITIONER

## 2024-06-24 PROCEDURE — 3078F DIAST BP <80 MM HG: CPT | Performed by: NURSE PRACTITIONER

## 2024-06-24 PROCEDURE — 99213 OFFICE O/P EST LOW 20 MIN: CPT | Performed by: NURSE PRACTITIONER

## 2024-06-24 NOTE — PROGRESS NOTES
Office Note     Name: David Barfield    : 1950     MRN: [id number]     Chief Complaint  Follow-up    Subjective     History of Present Illness:  David Barfield is a 74 y.o. male who presents today for follow-up    Patient presented today by himself.  He was very pleased to tell me that overall he is doing well.  He does feel like he is getting up more and getting more things done.  He is doing well with eating and drinking.  No recent weight loss.  He has felt like some of the eating has slowed down but has not stated that he has had any significant weight loss.  He recently had his PET scan and has a follow-up with hematology oncology soon/tomorrow.    We did discuss his labs from last week.  Overall he has continued to have improvement.      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    Coronary artery disease 2019    Diabetes mellitus Borderline    Emphysema/COPD     Erectile disorder     GERD (gastroesophageal reflux disease)     History of chemotherapy     Hyperlipidemia     Hypertension 2019    Lung nodule     Mumps     Mumps     Pruritus     after bath    Slow to wake up after anesthesia     Stage 5 chronic kidney disease not on chronic dialysis 2024    Wears dentures     upper only    Wears hearing aid in both ears     usually only wears right       Past Surgical History:   Procedure Laterality Date    BONE BIOPSY      broken bone surgery in his face    BRONCHOSCOPY THORACOTOMY Right 2024    Procedure: THORACOTOMY FOR LOWER LOBECTOMY AND MEDISTINAL LYMPH NODE DISSECTION RIGHT;  Surgeon: Joey Patel MD;  Location: [facility] OR;  Service: Cardiothoracic;  Laterality: Right;    BRONCHOSCOPY WITH ION ROBOTIC ASSIST N/A 09/15/2023    Procedure: BRONCHOSCOPY NAVIGATION WITH ENDOBRONCHIAL ULTRASOUND AND ION ROBOT;  Surgeon: Octaviano Sampson MD;  Location: [facility]  ENDOSCOPY;  Service: Robotics - Pulmonary;  Laterality: N/A;  ion #6 - [id number]  - 0015  Cath guide 0061    EBUS balloon removed and intact    CARDIAC ELECTROPHYSIOLOGY PROCEDURE N/A 08/17/2021    Procedure: Pacemaker DC new;  Surgeon: Kayy Box MD;  Location: [facility] CATH INVASIVE LOCATION;  Service: Cardiology;  Laterality: N/A;    FACIAL FRACTURE SURGERY      LYMPH NODE BIOPSY  2023    PACEMAKER IMPLANTATION         Social History     Socioeconomic History    Marital status: Single    Number of children: 3   Tobacco Use    Smoking status: Every Day     Current packs/day: 0.50     Average packs/day: 0.5 packs/day for 56.5 years (28.7 ttl pk-yrs)     Types: Cigarettes     Start date: 1/1/1968    Smokeless tobacco: Never    Tobacco comments:     Still smoke   Vaping Use    Vaping status: Never Used   Substance and Sexual Activity    Alcohol use: Never    Drug use: Never    Sexual activity: Yes     Partners: Female     Birth control/protection: None         Current Outpatient Medications:     albuterol sulfate  (90 Base) MCG/ACT inhaler, Inhale 2 puffs Every 4 (Four) Hours As Needed for Wheezing., Disp: 18 g, Rfl: 11    B Complex Vitamins (vitamin b complex) capsule capsule, Take 1 capsule by mouth Daily. OTC, Disp: , Rfl:     ferrous sulfate 325 (65 FE) MG tablet, Take 1 tablet by mouth Daily With Breakfast. OTC, Disp: , Rfl:     fludrocortisone 0.1 MG tablet, Take 0.5 (one-half) tablet by mouth Daily for 30 days., Disp: 15 tablet, Rfl: 0    fluticasone (VERAMYST) 27.5 MCG/SPRAY nasal spray, 2 sprays into the nostril(s) as directed by provider 1 (One) Time As Needed for Rhinitis or Allergies., Disp: 6 mL, Rfl: 2    Fluticasone-Umeclidin-Vilant (Trelegy Ellipta) 100-62.5-25 MCG/ACT inhaler, Inhale 1 puff Daily., Disp: 3 each, Rfl: 3    HYDROcodone-acetaminophen (Norco) 7.5-325 MG per tablet, Take 1 tablet by mouth Every 6 (Six) Hours As Needed for Moderate Pain., Disp: 60 tablet, Rfl: 0     "lidocaine-prilocaine (EMLA) 2.5-2.5 % cream, Apply 1 application  topically to the appropriate area as directed As Needed (45-60 minutes prior to port access.  Cover with saran/plastic wrap.)., Disp: 30 g, Rfl: 3    magnesium chloride ER 64 MG DR tablet, Take 1 tablet by mouth Daily., Disp: , Rfl:     megestrol (MEGACE) 40 MG tablet, Take 0.5 (one-half) tablet (20mg) by mouth Daily for 30 days., Disp: 15 tablet, Rfl: 0    midodrine (PROAMATINE) 10 MG tablet, Take 1 tablet by mouth 3 (Three) Times a Day As Needed (Systolic BP less then 100)., Disp: 30 tablet, Rfl: 0    omeprazole (priLOSEC) 40 MG capsule, Take 1 capsule by mouth Daily., Disp: 30 capsule, Rfl: 5    ondansetron (ZOFRAN) 8 MG tablet, Take 1 tablet by mouth 3 (Three) Times a Day As Needed for Nausea or Vomiting., Disp: 30 tablet, Rfl: 2    pravastatin (PRAVACHOL) 80 MG tablet, Take 1 tablet by mouth Every Night., Disp: 90 tablet, Rfl: 1    Objective     Vital Signs  /62   Pulse 86   Temp 97.3 °F (36.3 °C) (Temporal)   Resp 22   Ht 182.9 cm (72.01\")   Wt 74.8 kg (164 lb 12.8 oz)   SpO2 98%   BMI 22.35 kg/m²   Estimated body mass index is 22.35 kg/m² as calculated from the following:    Height as of this encounter: 182.9 cm (72.01\").    Weight as of this encounter: 74.8 kg (164 lb 12.8 oz).    BMI is within normal parameters. No other follow-up for BMI required.         Physical Exam  Vitals and nursing note reviewed.   Constitutional:       Appearance: Normal appearance.   HENT:      Head: Normocephalic and atraumatic.   Eyes:      Extraocular Movements: Extraocular movements intact.      Pupils: Pupils are equal, round, and reactive to light.   Cardiovascular:      Rate and Rhythm: Normal rate and regular rhythm.   Pulmonary:      Effort: Pulmonary effort is normal.   Musculoskeletal:         General: Normal range of motion.   Skin:     General: Skin is warm and dry.   Neurological:      Mental Status: He is alert and oriented to person, " place, and time.   Psychiatric:         Mood and Affect: Mood normal.         Behavior: Behavior normal.                   Assessment and Plan     Diagnoses and all orders for this visit:    1. Encounter for follow-up (Primary)    2. Stage 4 chronic kidney disease  -     CBC (No Diff); Future  -     Basic Metabolic Panel; Future    3. Generalized weakness    4. Frequent hospital admissions    Plan  Very pleasant follow-up visit with patient today  Glad that he is doing so well  Will repeat CBC and BMP today per request of patient and significant other  Patient does have an upcoming appointment with hematology/oncology as well as cardiology.  Patient will be seen at next visit.  We will discuss when our next follow-up will be at that appointment.  Go to ER if any condition worsens or severe.    Follow Up  Return for Next scheduled follow up.    ANAMIKA Appiah    Part of this note may be an electronic transcription/translation of spoken language to printed text using the Dragon Dictation System.

## 2024-06-25 ENCOUNTER — OFFICE VISIT (OUTPATIENT)
Dept: ONCOLOGY | Facility: CLINIC | Age: 74
End: 2024-06-25
Payer: MEDICARE

## 2024-06-25 ENCOUNTER — TELEPHONE (OUTPATIENT)
Dept: INTERNAL MEDICINE | Facility: CLINIC | Age: 74
End: 2024-06-25
Payer: MEDICARE

## 2024-06-25 VITALS
HEART RATE: 70 BPM | HEIGHT: 72 IN | TEMPERATURE: 97.1 F | OXYGEN SATURATION: 97 % | SYSTOLIC BLOOD PRESSURE: 153 MMHG | BODY MASS INDEX: 21.81 KG/M2 | WEIGHT: 161 LBS | DIASTOLIC BLOOD PRESSURE: 79 MMHG

## 2024-06-25 DIAGNOSIS — C34.31 MALIGNANT NEOPLASM OF LOWER LOBE OF RIGHT LUNG: Primary | ICD-10-CM

## 2024-06-25 DIAGNOSIS — Z12.5 ENCOUNTER FOR SCREENING FOR MALIGNANT NEOPLASM OF PROSTATE: ICD-10-CM

## 2024-06-25 LAB
FUNGUS WND CULT: NORMAL
MYCOBACTERIUM SPEC CULT: NORMAL
NIGHT BLUE STAIN TISS: NORMAL

## 2024-06-25 PROCEDURE — 1126F AMNT PAIN NOTED NONE PRSNT: CPT | Performed by: INTERNAL MEDICINE

## 2024-06-25 PROCEDURE — 99214 OFFICE O/P EST MOD 30 MIN: CPT | Performed by: INTERNAL MEDICINE

## 2024-06-25 PROCEDURE — 3078F DIAST BP <80 MM HG: CPT | Performed by: INTERNAL MEDICINE

## 2024-06-25 PROCEDURE — 3077F SYST BP >= 140 MM HG: CPT | Performed by: INTERNAL MEDICINE

## 2024-06-25 RX ORDER — MEGESTROL ACETATE 40 MG/1
40 TABLET ORAL 2 TIMES DAILY
Qty: 60 TABLET | Refills: 3 | Status: SHIPPED | OUTPATIENT
Start: 2024-06-25 | End: 2024-10-23

## 2024-06-25 NOTE — TELEPHONE ENCOUNTER
----- Message from Hayley Castellanos sent at 6/25/2024  8:32 AM EDT -----  Please let patient know labs resulted.  We are continuing to see improvement in his kidney function.  I know he has an upcoming appointment with his hematologist today for follow-up on PET scan.

## 2024-06-25 NOTE — TELEPHONE ENCOUNTER
Called and spoke to Balaji. Gave message from provider. Balaji voiced understanding and appreciation.   Question for provider:   Wanted to ask if pt is to take about sodium bicarb indefinitely?         ----- Message from Hayley Castellanos sent at 6/25/2024  8:32 AM EDT -----  Please let patient know labs resulted.  We are continuing to see improvement in his kidney function.  I know he has an upcoming appointment with his hematologist today for follow-up on PET scan.

## 2024-06-28 NOTE — PROGRESS NOTES
Hematology and Oncology [place]  Office number [phone]    Fax number [phone]     Follow up     Date: 24    Patient Name: David Barfield  MRN: [id number]  : 1950    Referring Physician: Dr. Sampson    Chief Complaint: Nonsmall cell lung cancer follow-up on treatment    Cancer Staging:  Cancer Staging   Stage IIIA (cT2b, cN2, cM0)    History of Present Illness: David Barfield is a pleasant 74 y.o. male who presents today for evaluation of NSCLC. He has a 50 pack year smoking history.    He has a history of a PET avid subpleural RLL lung nod[facility] [place] [facility]. This had an SUV of 9.8 on PET  in association with increased mediastinal LN uptake with SUV around 3. Imaging was felt secondary to osteophyte fibrosis. He did undergo bronchoscopy 2020 with negative cytology. The RLL lung nodule was not felt amenable to bronchoscopy biopsy.    CT lung screen 2023 showed:      PET CT showed mass in the RLL intensely SUV avid, max 17. Mediastinal LN not hypermetabolic above baseline.     Right lower lobe robotic transbronchial biopsy and cytology on 9/15/2023 showed benign findings. Cultures including AFT and fungal were negative.  Station 11L, Station 4L LN negative  Station 7 LN showed NSCLCa (positive for cytokeratin 7, p63, and TTF-1 with no significant staining for p40 or Napsin. The staining pattern raises the possibility of mixed adenocarcinoma and squamous cell carcinoma differentiation in this tumor). PDL1 negative.    Tempus negative for targetable mutations on liquid biopsy. QNS on tissue at diagnosis.    MRI brain was negative.    He has a history of sick sinus syndrome s/p PPM and moderate COPD. He describes only mild physical limitations from this. For example he recently walked [facility].5 miles [facility]. At home, he climbs 17 steps without issue. He does sit down with long activities.     Following neoadjuvant chemoimmunotherapy  he underwent lobectomy and mediastinal lymph node dissection on January 9, 2024.  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  Margins were negative.  Regional lymph nodes including 4R, 12 R, and 7 were negative as were 5 intralobar lymph nodes.    Treatment history:  Carbo, taxol, nivo, C1 10/24/23; C2 11/15/23; C3 11/21/23  Atezolizumab maintenance, cycle 1 2/6/2024; cycle 2 2/27/24: stopped for intolerance (nausea, malaise, poor QOL) but no severe immune events  Opdivo maintenance, cycle 1: 4/4/24: terminated for ARACELI    Interval history:   Mr. Barfield is here in the company of his significant other for follow-up. He is doing substantially better. Continues salt tabs and fludrocortisone. Tolerating well. No recurrent infections. No recurrent hypotension.   Sees nephrology next week.     Past Medical History:   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    Coronary artery disease 2019    Diabetes mellitus Borderline    Emphysema/COPD     Erectile disorder     GERD (gastroesophageal reflux disease)     History of chemotherapy     Hyperlipidemia     Hypertension 2019    Lung nodule     Mumps     Mumps     Pruritus     after bath    Slow to wake up after anesthesia     Stage 5 chronic kidney disease not on chronic dialysis 5/14/2024    Wears dentures     upper only    Wears hearing aid in both ears     usually only wears right   No personal history of myocardial infarction, cerebrovascular event, or venous thromboembolism.  No autoimmune diseases.   No prior cscope, mailed cologuard recently    Past Surgical History:   Past Surgical History:   Procedure Laterality Date    BONE BIOPSY      broken bone surgery in his face    BRONCHOSCOPY THORACOTOMY Right 01/09/2024    Procedure:  THORACOTOMY FOR LOWER LOBECTOMY AND MEDISTINAL LYMPH NODE DISSECTION RIGHT;  Surgeon: Joey Patel MD;  Location:  [facility];  Service: Cardiothoracic;  Laterality: Right;    BRONCHOSCOPY WITH ION ROBOTIC ASSIST N/A 09/15/2023    Procedure: BRONCHOSCOPY NAVIGATION WITH ENDOBRONCHIAL ULTRASOUND AND ION ROBOT;  Surgeon: Octaviano Sampson MD;  Location:  [facility];  Service: Robotics - Pulmonary;  Laterality: N/A;  ion #6 - [id number]  - 0015  Cath guide 0061    EBUS balloon removed and intact    CARDIAC ELECTROPHYSIOLOGY PROCEDURE N/A 08/17/2021    Procedure: Pacemaker DC new;  Surgeon: Kayy Box MD;  Location:  [facility];  Service: Cardiology;  Laterality: N/A;    FACIAL FRACTURE SURGERY      LYMPH NODE BIOPSY  2023    PACEMAKER IMPLANTATION       Family History:   Family History   Problem Relation Age of Onset    Aneurysm Mother         brain    Dementia Father     Leukemia Sister     Heart disease Paternal Grandmother     Hypertension Paternal Grandfather     Cancer Sister    Sister leukemia at age 21  No other malignancy    Social History:   Social History     Socioeconomic History    Marital status: Single    Number of children: 3   Tobacco Use    Smoking status: Every Day     Current packs/day: 0.50     Average packs/day: 0.5 packs/day for 56.5 years (28.7 ttl pk-yrs)     Types: Cigarettes     Start date: 1/1/1968    Smokeless tobacco: Never    Tobacco comments:     Still smoke   Vaping Use    Vaping status: Never Used   Substance and Sexual Activity    Alcohol use: Never    Drug use: Never    Sexual activity: Yes     Partners: Female     Birth control/protection: None   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, [place]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 OTC, Disp: , Rfl:     ferrous sulfate 325 (65 FE) MG tablet, Take 1 tablet by mouth Daily With Breakfast. OTC, Disp: , Rfl:     fludrocortisone 0.1 MG tablet, Take 0.5 (one-half) tablet by mouth Daily for 30 days. (Patient taking differently: Take 0.5 tablets by mouth 2 (Two) Times a Day.), Disp: 15 tablet, Rfl: 0    fluticasone (VERAMYST) 27.5 MCG/SPRAY nasal spray, 2 sprays into the nostril(s) as directed by provider 1 (One) Time As Needed for Rhinitis or Allergies., Disp: 6 mL, Rfl: 2    Fluticasone-Umeclidin-Vilant (Trelegy Ellipta) 100-62.5-25 MCG/ACT inhaler, Inhale 1 puff Daily., Disp: 3 each, Rfl: 3    HYDROcodone-acetaminophen (Norco) 7.5-325 MG per tablet, Take 1 tablet by mouth Every 6 (Six) Hours As Needed for Moderate Pain., Disp: 60 tablet, Rfl: 0    lidocaine-prilocaine (EMLA) 2.5-2.5 % cream, Apply 1 application  topically to the appropriate area as directed As Needed (45-60 minutes prior to port access.  Cover with saran/plastic wrap.)., Disp: 30 g, Rfl: 3    magnesium chloride ER 64 MG DR tablet, Take 1 tablet by mouth Daily., Disp: , Rfl:     midodrine (PROAMATINE) 10 MG tablet, Take 1 tablet by mouth 3 (Three) Times a Day As Needed (Systolic BP less then 100)., Disp: 30 tablet, Rfl: 0    omeprazole (priLOSEC) 40 MG capsule, Take 1 capsule by mouth Daily., Disp: 30 capsule, Rfl: 5    ondansetron (ZOFRAN) 8 MG tablet, Take 1 tablet by mouth 3 (Three) Times a Day As Needed for Nausea or Vomiting., Disp: 30 tablet, Rfl: 2    pravastatin (PRAVACHOL) 80 MG tablet, Take 1 tablet by mouth Every Night., Disp: 90 tablet, Rfl: 1    Allergies:   Allergies   Allergen Reactions    Cymbalta [Duloxetine Hcl] GI Intolerance    Gabapentin Mental Status Change     Pt states that this medication \"makes him feel " "foolish in his head\".     Remeron [Mirtazapine] Other (See Comments)     Excess sedation    Toradol [Ketorolac Tromethamine] GI Intolerance     Projectile vomiting     Latex Other (See Comments)     Latex allergy     Tape Rash       Objective     Vital Signs:   Vitals:    06/25/24 1504   BP: 153/79   Pulse: 70   Temp: 97.1 °F (36.2 °C)   TempSrc: Infrared   SpO2: 97%   Weight: 73 kg (161 lb)   Height: 182.9 cm (72.01\")   PainSc: 0-No pain    Body mass index is 21.83 kg/m².   Pain Score    06/25/24 1504   PainSc: 0-No pain       ECOG Performance Status: 1 - Symptomatic but completely ambulatory    Physical Exam:   General: No acute distress.   HEENT: Normocephalic, atraumatic. Sclera anicteric.   Neck: supple, no adenopathy.   Cardiovascular: RRR no murmurs  Respiratory: Clear to auscultation bilaterally.  Abdomen: Soft, nontender, non distended with normoactive bowel sounds  Lymph: no cervical, supraclavicular or axillary adenopathy  Neuro: Alert and oriented x 3. No focal deficits.   Ext: Symmetric, no swelling.   Accurate as of 6/25/24    Laboratory/Imaging Reviewed:   Lab on 06/24/2024   Component Date Value Ref Range Status    WBC 06/24/2024 11.68 (H)  3.40 - 10.80 10*3/mm3 Final    RBC 06/24/2024 3.18 (L)  4.14 - 5.80 10*6/mm3 Final    Hemoglobin 06/24/2024 9.6 (L)  13.0 - 17.7 g/dL Final    Hematocrit 06/24/2024 29.9 (L)  37.5 - 51.0 % Final    MCV 06/24/2024 94.0  79.0 - 97.0 fL Final    MCH 06/24/2024 30.2  26.6 - 33.0 pg Final    MCHC 06/24/2024 32.1  31.5 - 35.7 g/dL Final    RDW 06/24/2024 15.7 (H)  12.3 - 15.4 % Final    RDW-SD 06/24/2024 54.3 (H)  37.0 - 54.0 fl Final    MPV 06/24/2024 9.0  6.0 - 12.0 fL Final    Platelets 06/24/2024 345  140 - 450 10*3/mm3 Final    Glucose 06/24/2024 81  65 - 99 mg/dL Final    BUN 06/24/2024 18  8 - 23 mg/dL Final    Creatinine 06/24/2024 1.75 (H)  0.76 - 1.27 mg/dL Final    Sodium 06/24/2024 138  136 - 145 mmol/L Final    Potassium 06/24/2024 4.1  3.5 - 5.2 mmol/L " Final    Chloride 06/24/2024 107  98 - 107 mmol/L Final    CO2 06/24/2024 19.9 (L)  22.0 - 29.0 mmol/L Final    Calcium 06/24/2024 9.3  8.6 - 10.5 mg/dL Final    BUN/Creatinine Ratio 06/24/2024 10.3  7.0 - 25.0 Final    Anion Gap 06/24/2024 11.1  5.0 - 15.0 mmol/L Final    eGFR 06/24/2024 40.1 (L)  >60.0 mL/min/1.73 Final   Hospital Outpatient Visit on 06/20/2024   Component Date Value Ref Range Status    Glucose 06/20/2024 94  70 - 130 mg/dL Final    Serial Number: [id number]:  [id number]   Lab on 06/18/2024   Component Date Value Ref Range Status    Glucose 06/18/2024 88  65 - 99 mg/dL Final    BUN 06/18/2024 15  8 - 23 mg/dL Final    Creatinine 06/18/2024 1.92 (H)  0.76 - 1.27 mg/dL Final    Sodium 06/18/2024 140  136 - 145 mmol/L Final    Potassium 06/18/2024 4.2  3.5 - 5.2 mmol/L Final    Chloride 06/18/2024 108 (H)  98 - 107 mmol/L Final    CO2 06/18/2024 18.9 (L)  22.0 - 29.0 mmol/L Final    Calcium 06/18/2024 9.4  8.6 - 10.5 mg/dL Final    BUN/Creatinine Ratio 06/18/2024 7.8  7.0 - 25.0 Final    Anion Gap 06/18/2024 13.1  5.0 - 15.0 mmol/L Final    eGFR 06/18/2024 35.9 (L)  >60.0 mL/min/1.73 Final    WBC 06/18/2024 10.08  3.40 - 10.80 10*3/mm3 Final    RBC 06/18/2024 3.08 (L)  4.14 - 5.80 10*6/mm3 Final    Hemoglobin 06/18/2024 9.3 (L)  13.0 - 17.7 g/dL Final    Hematocrit 06/18/2024 28.9 (L)  37.5 - 51.0 % Final    MCV 06/18/2024 93.8  79.0 - 97.0 fL Final    MCH 06/18/2024 30.2  26.6 - 33.0 pg Final    MCHC 06/18/2024 32.2  31.5 - 35.7 g/dL Final    RDW 06/18/2024 15.9 (H)  12.3 - 15.4 % Final    RDW-SD 06/18/2024 54.5 (H)  37.0 - 54.0 fl Final    MPV 06/18/2024 9.2  6.0 - 12.0 fL Final    Platelets 06/18/2024 362  140 - 450 10*3/mm3 Final    Neutrophil % 06/18/2024 72.4  42.7 - 76.0 % Final    Lymphocyte % 06/18/2024 18.3 (L)  19.6 - 45.3 % Final    Monocyte % 06/18/2024 8.7  5.0 - 12.0 % Final    Eosinophil % 06/18/2024 0.0 (L)  0.3 - 6.2 % Final    Basophil % 06/18/2024 0.3  0.0 - 1.5 % Final     Immature Grans % 06/18/2024 0.3  0.0 - 0.5 % Final    Neutrophils, Absolute 06/18/2024 7.30 (H)  1.70 - 7.00 10*3/mm3 Final    Lymphocytes, Absolute 06/18/2024 1.84  0.70 - 3.10 10*3/mm3 Final    Monocytes, Absolute 06/18/2024 0.88  0.10 - 0.90 10*3/mm3 Final    Eosinophils, Absolute 06/18/2024 0.00  0.00 - 0.40 10*3/mm3 Final    Basophils, Absolute 06/18/2024 0.03  0.00 - 0.20 10*3/mm3 Final    Immature Grans, Absolute 06/18/2024 0.03  0.00 - 0.05 10*3/mm3 Final    nRBC 06/18/2024 0.0  0.0 - 0.2 /100 WBC Final     Component      Latest Ref Rng 4/10/2024   WBC      3.40 - 10.80 10*3/mm3 13.92 (H)    RBC      4.14 - 5.80 10*6/mm3 3.60 (L)    Hemoglobin      13.0 - 17.7 g/dL 11.2 (L)    Hematocrit      37.5 - 51.0 % 34.5 (L)    MCV      79.0 - 97.0 fL 95.8    MCH      26.6 - 33.0 pg 31.1    MCHC      31.5 - 35.7 g/dL 32.5    RDW      12.3 - 15.4 % 14.5    RDW-SD      37.0 - 54.0 fl 51.9    MPV      6.0 - 12.0 fL 8.1    Platelets      140 - 450 10*3/mm3 396    Neutrophil Rel %      42.7 - 76.0 % 77.1 (H)    Lymphocyte Rel %      19.6 - 45.3 % 13.8 (L)    Monocyte Rel %      5.0 - 12.0 % 7.5    Eosinophil Rel %      0.3 - 6.2 % 1.3    Basophil Rel %      0.0 - 1.5 % 0.2    Immature Granulocyte Rel %      0.0 - 0.5 % 0.1    Neutrophils Absolute      1.70 - 7.00 10*3/mm3 10.73 (H)    Lymphocytes Absolute      0.70 - 3.10 10*3/mm3 1.92    Monocytes Absolute      0.10 - 0.90 10*3/mm3 1.04 (H)    Eosinophils Absolute      0.00 - 0.40 10*3/mm3 0.18    Basophils Absolute      0.00 - 0.20 10*3/mm3 0.03    Immature Grans, Absolute      0.00 - 0.05 10*3/mm3 0.02    Glucose      65 - 99 mg/dL 110 (H)    BUN      8 - 23 mg/dL 17    Creatinine      0.76 - 1.27 mg/dL 1.55 (H)    Sodium      136 - 145 mmol/L 141    Potassium      3.5 - 5.2 mmol/L 4.0    Chloride      98 - 107 mmol/L 104    CO2      22.0 - 29.0 mmol/L 27.0    Calcium      8.6 - 10.5 mg/dL 9.7    BUN/Creatinine Ratio      7.0 - 25.0  11.0    Anion Gap      5.0 - 15.0  mmol/L 10.0    eGFR      >60.0 mL/min/1.73 46.4 (L)    Total Protein      6.0 - 8.5 g/dL 9.0 (H)    Albumin      3.5 - 5.2 g/dL 4.1    ALT (SGPT)      1 - 41 U/L 19    AST (SGOT)      1 - 40 U/L 24    Alkaline Phosphatase      39 - 117 U/L 95    Total Bilirubin      0.0 - 1.2 mg/dL 0.2    Bilirubin, Direct      0.0 - 0.3 mg/dL <0.2      NM PET/CT Skull Base to Mid Thigh    Result Date: 6/21/2024  Narrative: NM PET/CT SKULL BASE TO MID THIGH Date of Exam: 6/20/2024 10:50 AM EDT Indication: lung ca. Comparison: CT chest abdomen pelvis 5/19/2024, PET/CT 3/28/2024 Technique: 13.8 mCi of F-18 FDG was administered intravenously. PET imaging was obtained from skull base to mid-thigh approximately 60 minutes after radiotracer injection. A low dose non contrast CT was obtained for attenuation correction and anatomic localization. Fused PET-CT and 3D MIP reconstructions were utilized for image interpretation.  Fasting blood glucose level: 94 mg/dl. Reference uptake values: Mediastinum: 2.7 SUVmax Liver: 3.2 SUVmax Normalization method: Body Weight Findings: Head and neck: There is normal symmetric FDG uptake within the brain. There is physiologic uptake in the oropharynx and muscles of phonation. No hypermetabolic neck mass or cervical lymph node. Chest: Redemonstration of a few scattered small mildly FDG-avid hilar and mediastinal nodes, similar to prior; right paratracheal nodes measure SUV max 4.6 (previously 4.2), right hilar nodes measure SUV max 4.6 (previously 3.3), left hilar nodes measure SUV max 4.2 (previously 3.5). A nonenlarged mildly FDG avid right supraclavicular node measures SUV max 3.8, previously 3.1). No definite new clearly enlarged or hypermetabolic thoracic nodes. No hypermetabolic pulmonary parenchymal process. Similar appearance of surgical changes of right lower lobectomy. Similar small right pleural effusion without hypermetabolism. There is background emphysema. Redemonstration of left chest port  and cardiac pacer device. There are coronary artery calcifications. Abdomen and pelvis: No focal hypermetabolism to suggest primary or metastatic visceral or noah disease within the abdomen or pelvis. There is physiologic uptake in the GI and  tracts. No adrenal nodule. There is atherosclerosis of the abdominal aorta. Bones and body wall soft tissues: No focal or suspicious uptake.     Impression: Impression: Similar appearance of some mildly FDG avid, nonenlarged mediastinal and hilar nodes and right supraclavicular node. This is somewhat nonspecific. Otherwise no findings to suggest metastatic disease. Electronically Signed: Godfrey Real MD  6/21/2024 12:52 PM EDT  Workstation ID: [id number]    XR Chest 1 View    Result Date: 5/29/2024  Narrative: XR CHEST 1 VW Date of Exam: 5/29/2024 12:00 PM EDT Indication: dyspnea Comparison: 5/19/2024 Findings: Right-sided pacemaker and left chest port again noted. Heart size is within normal limits. There is a moderate sized right pleural effusion, increased. There is a probable small left pleural effusion, new. There is increasing mild atelectasis of the lung  bases. There are chronic emphysematous changes of the lung fields.     Impression: Impression: Moderate sized right effusion, increased from prior study. Mild bibasilar atelectasis. Probable small left effusion. Electronically Signed: Elsa Schmid MD  5/29/2024 1:20 PM EDT  Workstation ID: [id number]     Procedures    Assessment / Plan      Assessment/Plan:     Nonsmall cell lung cancer of the RLL, Stage IIIA  -He has an enlarging RLL nodule with SUV of 17. Despite negative transbronchial biopsy, he was found to have N2 disease with a positive level 7 LN. We discussed options for definitive treatment to include induction chemo immunotherapy followed by surgical resection, chemoradiation followed by surgical resection, or definitive chemoradiation. We discussed differences in schedules and side effects. He has no  contraindications to immunotherapy and I recommended nivolumab + chemotherapy induction.  His case was reviewed at multidisciplinary tumor board including thoracic surgery.  He was felt to be a good candidate for neoadjuvant chemo immunotherapy followed by resection assessment.  He completed 3 cycles of  nivolumab, carboplatin, paclitaxel x3 cycles in a neoadjuvant fashion.  He underwent right lower lobectomy and lymph node dissection.  -Final pathology reviewed and shows 1.4 cm of residual disease with extensive treatment effect.  The previously biopsied lymph node was negative on mediastinal dissection. Because his original bronchoscopy specimen was QNS for Tempus tissue testing, I ordered repeat Tempus on the resection specimen to rule out any EGFR or ALK mutation.  There was no mutation on liquid biopsy.  -His case was reviewed at multidisciplinary tumor board and consensus was to proceed with adjuvant immunotherapy without any indication for further chemotherapy or adjuvant radiation.  We proceeded with atezolizumab per the [id number] regimen as per NCCN guidelines. He has received 2 cycles of adjuvant immunotherapy with atezolizumab.  He reports substantial treatment related side effects.  He has not experienced any severe immunotherapy related adverse events that would necessitate cessation of all immunotherapy drugs.  However he has had intolerable side effects on atezolizumab including severe nausea and fatigue that last for a week and impair his quality of life, such that he does not feel he can continue with the atezolizumab treatments.  Because he had an excellent response to chemoimmunotherapy with the use of nivolumab, because he cannot continue the atezolizumab due to side effects, and because he did not have any severe immune related adverse events that would be considered a contraindication to further immunotherapy, I recommended that we proceed with adjuvant nivolumab.  He then had recurrent  admissions with infections, but also recurrent hypotension, ARACELI. He is doing better. Will omit further immunotherapy  -PET CT personally reviewed and MOODY.   -CBC/CMP wnl    4. ARACELI  -Recovering on repeat labs  Follows with nephrology soon    5. ?Mineralocorticoid def  -Responding clinically to fludrocortisone. Nephrology following. Continue for now. Can ultimately benefit from endo referral.     Follow-up   3 mo  Port flushes utnil removal.     Neetu Ashley MD  Hematology and Oncology     I have spent a total of 30 minutes on reviewing test results, preparing to see patient, counseling patient, performing medically appropriate exam and documenting clinical information in the electronic health record.

## 2024-07-01 ENCOUNTER — LAB (OUTPATIENT)
Dept: LAB | Facility: HOSPITAL | Age: 74
End: 2024-07-01
Payer: MEDICARE

## 2024-07-01 ENCOUNTER — OFFICE VISIT (OUTPATIENT)
Dept: INTERNAL MEDICINE | Facility: CLINIC | Age: 74
End: 2024-07-01
Payer: MEDICARE

## 2024-07-01 VITALS
SYSTOLIC BLOOD PRESSURE: 142 MMHG | HEART RATE: 76 BPM | RESPIRATION RATE: 22 BRPM | OXYGEN SATURATION: 99 % | TEMPERATURE: 97.6 F | BODY MASS INDEX: 22.62 KG/M2 | WEIGHT: 167 LBS | HEIGHT: 72 IN | DIASTOLIC BLOOD PRESSURE: 80 MMHG

## 2024-07-01 DIAGNOSIS — Z09 ENCOUNTER FOR FOLLOW-UP: Primary | ICD-10-CM

## 2024-07-01 DIAGNOSIS — Z12.5 ENCOUNTER FOR SCREENING FOR MALIGNANT NEOPLASM OF PROSTATE: ICD-10-CM

## 2024-07-01 DIAGNOSIS — C34.31 MALIGNANT NEOPLASM OF LOWER LOBE OF RIGHT LUNG: ICD-10-CM

## 2024-07-01 DIAGNOSIS — N18.32 STAGE 3B CHRONIC KIDNEY DISEASE: ICD-10-CM

## 2024-07-01 LAB
ALBUMIN SERPL-MCNC: 3.8 G/DL (ref 3.5–5.2)
ALBUMIN/GLOB SERPL: 1.1 G/DL
ALP SERPL-CCNC: 77 U/L (ref 39–117)
ALT SERPL W P-5'-P-CCNC: 13 U/L (ref 1–41)
ANION GAP SERPL CALCULATED.3IONS-SCNC: 11.7 MMOL/L (ref 5–15)
AST SERPL-CCNC: 15 U/L (ref 1–40)
BASOPHILS # BLD AUTO: 0.03 10*3/MM3 (ref 0–0.2)
BASOPHILS NFR BLD AUTO: 0.3 % (ref 0–1.5)
BILIRUB SERPL-MCNC: <0.2 MG/DL (ref 0–1.2)
BUN SERPL-MCNC: 14 MG/DL (ref 8–23)
BUN/CREAT SERPL: 7.2 (ref 7–25)
CALCIUM SPEC-SCNC: 9.4 MG/DL (ref 8.6–10.5)
CHLORIDE SERPL-SCNC: 110 MMOL/L (ref 98–107)
CO2 SERPL-SCNC: 19.3 MMOL/L (ref 22–29)
CREAT SERPL-MCNC: 1.94 MG/DL (ref 0.76–1.27)
DEPRECATED RDW RBC AUTO: 53.3 FL (ref 37–54)
EGFRCR SERPLBLD CKD-EPI 2021: 35.4 ML/MIN/1.73
EOSINOPHIL # BLD AUTO: 0 10*3/MM3 (ref 0–0.4)
EOSINOPHIL NFR BLD AUTO: 0 % (ref 0.3–6.2)
ERYTHROCYTE [DISTWIDTH] IN BLOOD BY AUTOMATED COUNT: 15.7 % (ref 12.3–15.4)
GLOBULIN UR ELPH-MCNC: 3.6 GM/DL
GLUCOSE SERPL-MCNC: 100 MG/DL (ref 65–99)
HCT VFR BLD AUTO: 29.2 % (ref 37.5–51)
HGB BLD-MCNC: 9.5 G/DL (ref 13–17.7)
IMM GRANULOCYTES # BLD AUTO: 0.04 10*3/MM3 (ref 0–0.05)
IMM GRANULOCYTES NFR BLD AUTO: 0.4 % (ref 0–0.5)
LYMPHOCYTES # BLD AUTO: 2.05 10*3/MM3 (ref 0.7–3.1)
LYMPHOCYTES NFR BLD AUTO: 18 % (ref 19.6–45.3)
MCH RBC QN AUTO: 30.6 PG (ref 26.6–33)
MCHC RBC AUTO-ENTMCNC: 32.5 G/DL (ref 31.5–35.7)
MCV RBC AUTO: 94.2 FL (ref 79–97)
MONOCYTES # BLD AUTO: 0.93 10*3/MM3 (ref 0.1–0.9)
MONOCYTES NFR BLD AUTO: 8.1 % (ref 5–12)
NEUTROPHILS NFR BLD AUTO: 73.2 % (ref 42.7–76)
NEUTROPHILS NFR BLD AUTO: 8.37 10*3/MM3 (ref 1.7–7)
NRBC BLD AUTO-RTO: 0 /100 WBC (ref 0–0.2)
PLATELET # BLD AUTO: 327 10*3/MM3 (ref 140–450)
PMV BLD AUTO: 9 FL (ref 6–12)
POTASSIUM SERPL-SCNC: 3.9 MMOL/L (ref 3.5–5.2)
PROT SERPL-MCNC: 7.4 G/DL (ref 6–8.5)
PSA SERPL-MCNC: 1.11 NG/ML (ref 0–4)
RBC # BLD AUTO: 3.1 10*6/MM3 (ref 4.14–5.8)
SODIUM SERPL-SCNC: 141 MMOL/L (ref 136–145)
T4 FREE SERPL-MCNC: 1.01 NG/DL (ref 0.92–1.68)
TSH SERPL DL<=0.05 MIU/L-ACNC: 1.46 UIU/ML (ref 0.27–4.2)
WBC NRBC COR # BLD AUTO: 11.42 10*3/MM3 (ref 3.4–10.8)

## 2024-07-01 PROCEDURE — 1159F MED LIST DOCD IN RCRD: CPT | Performed by: NURSE PRACTITIONER

## 2024-07-01 PROCEDURE — 84443 ASSAY THYROID STIM HORMONE: CPT

## 2024-07-01 PROCEDURE — 84439 ASSAY OF FREE THYROXINE: CPT

## 2024-07-01 PROCEDURE — 1160F RVW MEDS BY RX/DR IN RCRD: CPT | Performed by: NURSE PRACTITIONER

## 2024-07-01 PROCEDURE — 3044F HG A1C LEVEL LT 7.0%: CPT | Performed by: NURSE PRACTITIONER

## 2024-07-01 PROCEDURE — 1126F AMNT PAIN NOTED NONE PRSNT: CPT | Performed by: NURSE PRACTITIONER

## 2024-07-01 PROCEDURE — 85025 COMPLETE CBC W/AUTO DIFF WBC: CPT

## 2024-07-01 PROCEDURE — G0103 PSA SCREENING: HCPCS

## 2024-07-01 PROCEDURE — 3077F SYST BP >= 140 MM HG: CPT | Performed by: NURSE PRACTITIONER

## 2024-07-01 PROCEDURE — 36415 COLL VENOUS BLD VENIPUNCTURE: CPT

## 2024-07-01 PROCEDURE — 99213 OFFICE O/P EST LOW 20 MIN: CPT | Performed by: NURSE PRACTITIONER

## 2024-07-01 PROCEDURE — 3079F DIAST BP 80-89 MM HG: CPT | Performed by: NURSE PRACTITIONER

## 2024-07-01 PROCEDURE — 80053 COMPREHEN METABOLIC PANEL: CPT

## 2024-07-01 NOTE — PROGRESS NOTES
Office Note     Name: David Barfield    : 1950     MRN: [id number]     Chief Complaint  Follow-up    Subjective     History of Present Illness:  David Barfield is a 74 y.o. male who presents today for follow-up    Patient states that he does take fludrocortisone half tablet in the morning half tablet at night.  He did not take it last night as his blood pressure was elevated.  He did take a midodrine today before the visit as his systolic was in the 80s.  Patient states he is feeling well today.  No issues.  No dizziness or headaches.  He states that he has been continuing to follow a well-rounded diet and staying hydrated.  He does have an upcoming appointment with the kidney specialist.          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    Coronary artery disease 2019    Diabetes mellitus Borderline    Emphysema/COPD     Erectile disorder     GERD (gastroesophageal reflux disease)     History of chemotherapy     Hyperlipidemia     Hypertension 2019    Lung nodule     Mumps     Mumps     Pruritus     after bath    Slow to wake up after anesthesia     Stage 5 chronic kidney disease not on chronic dialysis 2024    Wears dentures     upper only    Wears hearing aid in both ears     usually only wears right       Past Surgical History:   Procedure Laterality Date    BONE BIOPSY      broken bone surgery in his face    BRONCHOSCOPY THORACOTOMY Right 2024    Procedure: THORACOTOMY FOR LOWER LOBECTOMY AND MEDISTINAL LYMPH NODE DISSECTION RIGHT;  Surgeon: Joey Patel MD;  Location: [facility] OR;  Service: Cardiothoracic;  Laterality: Right;    BRONCHOSCOPY WITH ION ROBOTIC ASSIST N/A 09/15/2023    Procedure: BRONCHOSCOPY NAVIGATION WITH ENDOBRONCHIAL ULTRASOUND AND ION ROBOT;  Surgeon: Octaviano Sampson MD;  Location: [facility] ENDOSCOPY;  Service: Robotics - Pulmonary;   Laterality: N/A;  ion #6 - [id number]  - 0015  Cath guide 0061    EBUS balloon removed and intact    CARDIAC ELECTROPHYSIOLOGY PROCEDURE N/A 08/17/2021    Procedure: Pacemaker DC new;  Surgeon: Kayy Box MD;  Location: [facility] CATH INVASIVE LOCATION;  Service: Cardiology;  Laterality: N/A;    FACIAL FRACTURE SURGERY      LYMPH NODE BIOPSY  2023    PACEMAKER IMPLANTATION         Social History     Socioeconomic History    Marital status: Single    Number of children: 3   Tobacco Use    Smoking status: Every Day     Current packs/day: 0.50     Average packs/day: 0.5 packs/day for 56.5 years (28.7 ttl pk-yrs)     Types: Cigarettes     Start date: 1/1/1968    Smokeless tobacco: Never    Tobacco comments:     Still smoke   Vaping Use    Vaping status: Never Used   Substance and Sexual Activity    Alcohol use: Never    Drug use: Never    Sexual activity: Yes     Partners: Female     Birth control/protection: None         Current Outpatient Medications:     albuterol sulfate  (90 Base) MCG/ACT inhaler, Inhale 2 puffs Every 4 (Four) Hours As Needed for Wheezing., Disp: 18 g, Rfl: 11    B Complex Vitamins (vitamin b complex) capsule capsule, Take 1 capsule by mouth Daily. OTC, Disp: , Rfl:     ferrous sulfate 325 (65 FE) MG tablet, Take 1 tablet by mouth Daily With Breakfast. OTC, Disp: , Rfl:     fludrocortisone 0.1 MG tablet, Take 0.5 (one-half) tablet by mouth Daily for 30 days. (Patient taking differently: Take 0.5 tablets by mouth 2 (Two) Times a Day.), Disp: 15 tablet, Rfl: 0    fluticasone (VERAMYST) 27.5 MCG/SPRAY nasal spray, 2 sprays into the nostril(s) as directed by provider 1 (One) Time As Needed for Rhinitis or Allergies., Disp: 6 mL, Rfl: 2    Fluticasone-Umeclidin-Vilant (Trelegy Ellipta) 100-62.5-25 MCG/ACT inhaler, Inhale 1 puff Daily., Disp: 3 each, Rfl: 3    HYDROcodone-acetaminophen (Norco) 7.5-325 MG per tablet, Take 1 tablet by mouth Every 6 (Six) Hours As Needed for Moderate Pain.,  "Disp: 60 tablet, Rfl: 0    lidocaine-prilocaine (EMLA) 2.5-2.5 % cream, Apply 1 application  topically to the appropriate area as directed As Needed (45-60 minutes prior to port access.  Cover with saran/plastic wrap.)., Disp: 30 g, Rfl: 3    magnesium chloride ER 64 MG DR tablet, Take 1 tablet by mouth Daily., Disp: , Rfl:     megestrol (MEGACE) 40 MG tablet, Take 1 tablet by mouth 2 (Two) Times a Day for 120 days., Disp: 60 tablet, Rfl: 3    midodrine (PROAMATINE) 10 MG tablet, Take 1 tablet by mouth 3 (Three) Times a Day As Needed (Systolic BP less then 100)., Disp: 30 tablet, Rfl: 0    omeprazole (priLOSEC) 40 MG capsule, Take 1 capsule by mouth Daily., Disp: 30 capsule, Rfl: 5    ondansetron (ZOFRAN) 8 MG tablet, Take 1 tablet by mouth 3 (Three) Times a Day As Needed for Nausea or Vomiting., Disp: 30 tablet, Rfl: 2    pravastatin (PRAVACHOL) 80 MG tablet, Take 1 tablet by mouth Every Night., Disp: 90 tablet, Rfl: 1    Objective     Vital Signs  /80   Pulse 76   Temp 97.6 °F (36.4 °C) (Temporal)   Resp 22   Ht 182.9 cm (72.01\")   Wt 75.8 kg (167 lb)   SpO2 99%   BMI 22.64 kg/m²   Estimated body mass index is 22.64 kg/m² as calculated from the following:    Height as of this encounter: 182.9 cm (72.01\").    Weight as of this encounter: 75.8 kg (167 lb).    BMI is within normal parameters. No other follow-up for BMI required.           Physical Exam  Vitals and nursing note reviewed.   Constitutional:       Appearance: Normal appearance.   HENT:      Head: Normocephalic and atraumatic.   Eyes:      Extraocular Movements: Extraocular movements intact.      Pupils: Pupils are equal, round, and reactive to light.   Cardiovascular:      Rate and Rhythm: Normal rate and regular rhythm.   Pulmonary:      Effort: Pulmonary effort is normal.   Musculoskeletal:         General: Normal range of motion.   Skin:     General: Skin is warm and dry.   Neurological:      Mental Status: He is alert and oriented to " person, place, and time.   Psychiatric:         Mood and Affect: Mood normal.         Behavior: Behavior normal.                   Assessment and Plan     Diagnoses and all orders for this visit:    1. Encounter for follow-up (Primary)    2. Stage 3b chronic kidney disease    Plan  Will hold on labs today.  Patient was grateful for this  Patient felt comfortable waiting to see me in another 2 to 3 months for his wellness visit  Instructed front to staff to please provide patient with wellness packet  Patient will continue to follow with all specialist  Go to ER if any condition worsens or severe  Continue to eat a well-rounded diet and stay well-hydrated  Plan to follow-up in 2 to 3 months for wellness visit    Follow Up  Return for 3m wellness.    ANAMIKA Appiah    Part of this note may be an electronic transcription/translation of spoken language to printed text using the Dragon Dictation System.

## 2024-07-02 LAB
FUNGUS WND CULT: NORMAL
MYCOBACTERIUM SPEC CULT: NORMAL
NIGHT BLUE STAIN TISS: NORMAL

## 2024-07-10 ENCOUNTER — OFFICE VISIT (OUTPATIENT)
Dept: CARDIOLOGY | Facility: CLINIC | Age: 74
End: 2024-07-10
Payer: MEDICARE

## 2024-07-10 VITALS
HEIGHT: 72 IN | BODY MASS INDEX: 22.62 KG/M2 | WEIGHT: 167 LBS | OXYGEN SATURATION: 100 % | SYSTOLIC BLOOD PRESSURE: 168 MMHG | HEART RATE: 71 BPM | DIASTOLIC BLOOD PRESSURE: 70 MMHG

## 2024-07-10 DIAGNOSIS — I95.89 CHRONIC HYPOTENSION: Primary | ICD-10-CM

## 2024-07-10 DIAGNOSIS — I49.3 PVC'S (PREMATURE VENTRICULAR CONTRACTIONS): ICD-10-CM

## 2024-07-10 PROCEDURE — 3078F DIAST BP <80 MM HG: CPT | Performed by: PHYSICIAN ASSISTANT

## 2024-07-10 PROCEDURE — 3077F SYST BP >= 140 MM HG: CPT | Performed by: PHYSICIAN ASSISTANT

## 2024-07-10 PROCEDURE — 99213 OFFICE O/P EST LOW 20 MIN: CPT | Performed by: PHYSICIAN ASSISTANT

## 2024-07-10 RX ORDER — FLUDROCORTISONE ACETATE 0.1 MG/1
0.1 TABLET ORAL DAILY
Qty: 90 TABLET | Refills: 1 | Status: SHIPPED | OUTPATIENT
Start: 2024-07-10

## 2024-07-10 RX ORDER — SODIUM BICARBONATE 650 MG/1
2 TABLET ORAL 3 TIMES DAILY
COMMUNITY
Start: 2024-07-08

## 2024-07-10 NOTE — PROGRESS NOTES
"[facility] Cardiology    Date: 07/10/2024    Patient ID: David Barfield is a 74 y.o. male   : 1950   Contact: [phone]         Chief Complaint:    Chief Complaint   Patient presents with    <9>SSS (sick sinus syndrome)       Problem List:  Sick sinus syndrome  Exercise MPS 20, OSH: No perfusion defect, EF 53%  Occasional asymptomatic junctional rhythm per previous cardiologist note  Zio patch 20, 13 days, OSH: min 31, max 152, avg 56. No pauses greater than 2 seconds. Second degree type 1 AV. < 1% PAC burden. 8.2% PVC burden.   2 week monitor --21: (56). SR, junctional, PVCs, PACs, isorhythmic dissociation and 4.7 sec pause at 9:30 am  Echo 21: EF 55%, mild LVH, trace MR, trace TR.   PPM implantation, 2021: [email or web address] Accolade MRI DR model L3 1 1 serial #[id number]  Premature ventricular contractions  Zio patch 20: 8.2% PVC burden  Thoracic aneurysm  CT chest 19, OSH: aortic root dilation, 4.2 cm.   CT chest 7/15/19, OSH: aortic root dilation, 4.2 cm.   CT chest 20, OSH: dilated aortic root 4.2 cm.   CT chest 2020, OSH: dilated aortic root, 4.2 cm  Tobacco abuse of 1 pack/day, ongoing as of 2022  Lung nodules  Hyperlipidemia  FLP 20: , Trig 94, HDL 37, .       Allergies   Allergen Reactions    Cymbalta [Duloxetine Hcl] GI Intolerance    Gabapentin Mental Status Change     Pt states that this medication \"makes him feel foolish in his head\".     Remeron [Mirtazapine] Other (See Comments)     Excess sedation    Toradol [Ketorolac Tromethamine] GI Intolerance     Projectile vomiting     Latex Other (See Comments)     Latex allergy     Tape Rash         Current Outpatient Medications:     albuterol sulfate  (90 Base) MCG/ACT inhaler, Inhale 2 puffs Every 4 (Four) Hours As Needed for Wheezing., Disp: 18 g, Rfl: 11    B Complex Vitamins (vitamin b complex) capsule capsule, Take 1 capsule by mouth " Daily. OTC, Disp: , Rfl:     ferrous sulfate 325 (65 FE) MG tablet, Take 1 tablet by mouth Daily With Breakfast. OTC, Disp: , Rfl:     fludrocortisone 0.1 MG tablet, Take 1 tablet by mouth Daily., Disp: 90 tablet, Rfl: 1    fluticasone (VERAMYST) 27.5 MCG/SPRAY nasal spray, 2 sprays into the nostril(s) as directed by provider 1 (One) Time As Needed for Rhinitis or Allergies., Disp: 6 mL, Rfl: 2    Fluticasone-Umeclidin-Vilant (Trelegy Ellipta) 100-62.5-25 MCG/ACT inhaler, Inhale 1 puff Daily., Disp: 3 each, Rfl: 3    lidocaine-prilocaine (EMLA) 2.5-2.5 % cream, Apply 1 application  topically to the appropriate area as directed As Needed (45-60 minutes prior to port access.  Cover with saran/plastic wrap.)., Disp: 30 g, Rfl: 3    magnesium chloride ER 64 MG DR tablet, Take 1 tablet by mouth Daily., Disp: , Rfl:     megestrol (MEGACE) 40 MG tablet, Take 1 tablet by mouth 2 (Two) Times a Day for 120 days., Disp: 60 tablet, Rfl: 3    omeprazole (priLOSEC) 40 MG capsule, Take 1 capsule by mouth Daily., Disp: 30 capsule, Rfl: 5    ondansetron (ZOFRAN) 8 MG tablet, Take 1 tablet by mouth 3 (Three) Times a Day As Needed for Nausea or Vomiting., Disp: 30 tablet, Rfl: 2    pravastatin (PRAVACHOL) 80 MG tablet, Take 1 tablet by mouth Every Night., Disp: 90 tablet, Rfl: 1    sodium bicarbonate 650 MG tablet, Take 2 tablets by mouth 3 times a day., Disp: , Rfl:     HYDROcodone-acetaminophen (Norco) 7.5-325 MG per tablet, Take 1 tablet by mouth Every 6 (Six) Hours As Needed for Moderate Pain. (Patient not taking: Reported on 7/10/2024), Disp: 60 tablet, Rfl: 0    midodrine (PROAMATINE) 10 MG tablet, Take 1 tablet by mouth 3 (Three) Times a Day As Needed (Systolic BP less then 100)., Disp: 30 tablet, Rfl: 0     History of Present Illness: David Barfield is a 74 y.o. male who is here today for        The following portions of the patient's history were reviewed and updated as appropriate: allergies, current medications and  "problem list.     Pertinent positives as listed in the HPI.  All other systems reviewed are negative.            Vitals:    07/10/24 1102   BP: 168/70   BP Location: Right arm   Patient Position: Sitting   Cuff Size: Adult   Pulse: 71   SpO2: 100%   Weight: 75.8 kg (167 lb)   Height: 182.9 cm (72.01\")     Body mass index is 22.64 kg/m².    Physical Exam:  General: Alert and oriented.  Neck: Jugular venous pressure is within normal limits. Carotids have normal upstrokes without bruits.   Cardiovascular: Regular rate and rhythm. No murmur, gallop or rub.  Lungs: Clear, no rales or wheezes. Equal expansion is noted.   Extremities: No peripheral edema  Skin: Warm and dry.  Neurologic: Nonfocal.     Diagnostic data (reviewed with patient):  CMP:   Lab Results   Component Value Date    GLUCOSE 100 (H) 07/01/2024    BUN 14 07/01/2024    CREATININE 1.94 (H) 07/01/2024    EGFRIFNONA 82 08/17/2021    BCR 7.2 07/01/2024    K 3.9 07/01/2024    CO2 19.3 (L) 07/01/2024    CALCIUM 9.4 07/01/2024    PROTENTOTREF 7.3 03/27/2024    ALBUMIN 3.8 07/01/2024    LABIL2 0.8 03/27/2024    AST 15 07/01/2024    ALT 13 07/01/2024       CBC:    Lab Results   Component Value Date    WBC 11.42 (H) 07/01/2024    HGB 9.5 (L) 07/01/2024    HCT 29.2 (L) 07/01/2024    MCV 94.2 07/01/2024     07/01/2024       LIPIDS:    Lab Results   Component Value Date    CHOL 157 05/03/2024    TRIG 206 (H) 05/03/2024    HDL 27 (L) 05/03/2024    LDL 94 05/03/2024       HgbA1c:    Lab Results   Component Value Date    HGBA1C 6.30 (H) 05/03/2024       Advance Care Planning   ACP discussion was declined by the patient. Patient has an advance directive in EMR which is still valid.             Assessment:  1. Chronic hypotension    2. PVC's (premature ventricular contractions)             Plan:    Continue all other current medications.  F/up in  months, sooner if needed.    Tricia Muse PA-C     "

## 2024-08-07 ENCOUNTER — HOSPITAL ENCOUNTER (OUTPATIENT)
Dept: ONCOLOGY | Facility: HOSPITAL | Age: 74
Discharge: HOME | End: 2024-08-07
Admitting: INTERNAL MEDICINE
Payer: MEDICARE

## 2024-08-07 VITALS
TEMPERATURE: 99 F | SYSTOLIC BLOOD PRESSURE: 180 MMHG | HEART RATE: 69 BPM | BODY MASS INDEX: 23.3 KG/M2 | WEIGHT: 172 LBS | DIASTOLIC BLOOD PRESSURE: 93 MMHG | HEIGHT: 72 IN | RESPIRATION RATE: 18 BRPM

## 2024-08-07 DIAGNOSIS — C34.31 MALIGNANT NEOPLASM OF LOWER LOBE OF RIGHT LUNG: Primary | ICD-10-CM

## 2024-08-07 PROCEDURE — 96523 IRRIG DRUG DELIVERY DEVICE: CPT

## 2024-08-07 PROCEDURE — 25010000002 HEPARIN LOCK FLUSH PER 10 UNITS: Performed by: INTERNAL MEDICINE

## 2024-08-07 RX ORDER — SODIUM CHLORIDE 0.9 % (FLUSH) 0.9 %
10 SYRINGE (ML) INJECTION AS NEEDED
OUTPATIENT
Start: 2024-08-07

## 2024-08-07 RX ORDER — HEPARIN SODIUM (PORCINE) LOCK FLUSH IV SOLN 100 UNIT/ML 100 UNIT/ML
500 SOLUTION INTRAVENOUS AS NEEDED
OUTPATIENT
Start: 2024-08-07

## 2024-08-07 RX ORDER — HEPARIN SODIUM (PORCINE) LOCK FLUSH IV SOLN 100 UNIT/ML 100 UNIT/ML
500 SOLUTION INTRAVENOUS AS NEEDED
Status: DISCONTINUED | OUTPATIENT
Start: 2024-08-07 | End: 2024-08-08 | Stop reason: HOSPADM

## 2024-08-07 RX ADMIN — HEPARIN 500 UNITS: 100 SYRINGE at 13:05

## 2024-08-19 ENCOUNTER — OFFICE VISIT (OUTPATIENT)
Dept: PULMONOLOGY | Facility: CLINIC | Age: 74
End: 2024-08-19
Payer: MEDICARE

## 2024-08-19 VITALS
BODY MASS INDEX: 22.89 KG/M2 | SYSTOLIC BLOOD PRESSURE: 140 MMHG | TEMPERATURE: 98.5 F | HEART RATE: 86 BPM | OXYGEN SATURATION: 99 % | WEIGHT: 169 LBS | DIASTOLIC BLOOD PRESSURE: 84 MMHG | HEIGHT: 72 IN

## 2024-08-19 DIAGNOSIS — Z72.0 TOBACCO ABUSE: ICD-10-CM

## 2024-08-19 DIAGNOSIS — J43.2 CENTRILOBULAR EMPHYSEMA: Primary | ICD-10-CM

## 2024-08-19 DIAGNOSIS — C34.31 MALIGNANT NEOPLASM OF LOWER LOBE OF RIGHT LUNG: ICD-10-CM

## 2024-08-19 PROCEDURE — 94375 RESPIRATORY FLOW VOLUME LOOP: CPT | Performed by: INTERNAL MEDICINE

## 2024-08-19 PROCEDURE — 3077F SYST BP >= 140 MM HG: CPT | Performed by: INTERNAL MEDICINE

## 2024-08-19 PROCEDURE — 3079F DIAST BP 80-89 MM HG: CPT | Performed by: INTERNAL MEDICINE

## 2024-08-19 PROCEDURE — 94726 PLETHYSMOGRAPHY LUNG VOLUMES: CPT | Performed by: INTERNAL MEDICINE

## 2024-08-19 PROCEDURE — 99214 OFFICE O/P EST MOD 30 MIN: CPT | Performed by: INTERNAL MEDICINE

## 2024-08-19 PROCEDURE — 94729 DIFFUSING CAPACITY: CPT | Performed by: INTERNAL MEDICINE

## 2024-08-19 NOTE — PROGRESS NOTES
Pulmonary Consult Note    Subjective   Reason for consultation: Lung nodule    David Barfield is a 74 y.o. male is being seen for consultation today at the request of No ref. provider found    History of Present Illness    74 y.o. male active smoker of 1/2 PPD up to 2 PPD for 50 years with a history of HTN, HL, here for evaluation of a right sided lung nodule.  Had bronchoscopy/EBUS in 2020 at the [facility] that was reportedly benign.  He worked in construction up to age 71.  Had PPM in 2021 by Dr. Box and is followed by Dr. Pop.      Patient has an appointment at the [facility] on September 13th for a procedure but patient/family do not want it done there.      Patient denies dyspnea, hemoptysis, unexplained weight loss, or palpable adenopathy.  He had an episode a few months ago during which time he had worsening dyspnea.  He does not take current inhalers.      Subsequent history:    I performed a bronchoscopy with EBUS on 9/15/2023.  Results from biopsies showed a nondiagnostic sample from the right lower lobe however the station 7 lymph node was positive for non-small cell carcinoma.  Patient has undergone a right lower lobe lobectomy by Dr. Patel on 1/9/2024.  He received neoadjuvant chemotherapy/immunotherapy under the direction of Dr. Ashley.  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  No evidence of malignancy in the lymph nodes.  He is doing quite well postoperatively.  He is following with Dr. Ashley with plans for immunotherapy going forward.    Interval history:  Patient is here for follow up.  He reports a headache over the past 2 months or so, mainly frontal, though also involving his neck.  He reports his breathing is okay and remains on Trelegy.  He has fatigue and weight loss.     The following portions of the patient's history were reviewed and updated as appropriate: allergies, current medications, past family history, past medical history, past social history,  past surgical history, and problem list.    Active Ambulatory Problems     Diagnosis Date Noted    High degree atrioventricular block 06/30/2021    Bradycardia, sinus 06/30/2021    Chronic hypotension 06/30/2021    PVC's (premature ventricular contractions) 06/30/2021    Presence of cardiac pacemaker 07/05/2023    Mixed hyperlipidemia 07/05/2023    Centrilobular emphysema 08/29/2023    Nodule of lower lobe of right lung 08/29/2023    Mediastinal adenopathy 08/29/2023    Cough 08/29/2023    Tobacco abuse 08/29/2023    Bronchogenic cancer of right lung 10/04/2023    Malignant neoplasm of lower lobe of right lung 10/04/2023    Primary hypertension 01/11/2024    GERD without esophagitis 01/11/2024    Septic shock 04/12/2024    Acute pyelonephritis 04/12/2024    ARACELI (acute kidney injury) 04/13/2024    Hypokalemia 04/13/2024    Severe malnutrition 04/15/2024    Leukocytosis 04/24/2024    CAD (coronary artery disease) 05/06/2024    Hypotension 05/14/2024    Stage 4 chronic kidney disease 05/14/2024    Moderate malnutrition 05/17/2024    Weakness 05/20/2024    Hypotension 05/25/2024    Dehydration 05/25/2024    C. difficile colitis 05/25/2024    UTI (urinary tract infection) 05/25/2024    Hx of hypotension 05/25/2024     Resolved Ambulatory Problems     Diagnosis Date Noted    Lung cancer 01/09/2024     Past Medical History:   Diagnosis Date    Abnormal ECG     Arrhythmia 2019    Asthma 2019    Coronary artery disease 2019    Diabetes mellitus Borderline    Emphysema/COPD     Erectile disorder     GERD (gastroesophageal reflux disease)     History of chemotherapy     Hyperlipidemia     Hypertension 2019    Lung nodule     Mumps     Mumps     Pruritus     Slow to wake up after anesthesia     Stage 5 chronic kidney disease not on chronic dialysis 5/14/2024    Wears dentures     Wears hearing aid in both ears        Past Surgical History:   Procedure Laterality Date    BONE BIOPSY      broken bone surgery in his face     "BRONCHOSCOPY THORACOTOMY Right 01/09/2024    Procedure: THORACOTOMY FOR LOWER LOBECTOMY AND MEDISTINAL LYMPH NODE DISSECTION RIGHT;  Surgeon: Joey Patel MD;  Location: [facility] OR;  Service: Cardiothoracic;  Laterality: Right;    BRONCHOSCOPY WITH ION ROBOTIC ASSIST N/A 09/15/2023    Procedure: BRONCHOSCOPY NAVIGATION WITH ENDOBRONCHIAL ULTRASOUND AND ION ROBOT;  Surgeon: Octaviano Sampson MD;  Location:  [facility];  Service: Robotics - Pulmonary;  Laterality: N/A;  ion #6 - 0032  - 0015  Cath guide 0061    EBUS balloon removed and intact    CARDIAC ELECTROPHYSIOLOGY PROCEDURE N/A 08/17/2021    Procedure: Pacemaker DC new;  Surgeon: Kayy Box MD;  Location:  [facility];  Service: Cardiology;  Laterality: N/A;    FACIAL FRACTURE SURGERY      LYMPH NODE BIOPSY  2023    PACEMAKER IMPLANTATION         Family History   Problem Relation Age of Onset    Aneurysm Mother         brain    Dementia Father     Leukemia Sister     Heart disease Paternal Grandmother     Hypertension Paternal Grandfather     Cancer Sister        Social History     Socioeconomic History    Marital status: Single    Number of children: 3   Tobacco Use    Smoking status: Every Day     Current packs/day: 0.50     Average packs/day: 0.5 packs/day for 56.6 years (28.8 ttl pk-yrs)     Types: Cigarettes     Start date: 1/1/1968    Smokeless tobacco: Never    Tobacco comments:     Still smoke   Vaping Use    Vaping status: Never Used   Substance and Sexual Activity    Alcohol use: Never    Drug use: Never    Sexual activity: Yes     Partners: Female     Birth control/protection: None       Allergies   Allergen Reactions    Cymbalta [Duloxetine Hcl] GI Intolerance    Gabapentin Mental Status Change     Pt states that this medication \"makes him feel foolish in his head\".     Remeron [Mirtazapine] Other (See Comments)     Excess sedation    Toradol [Ketorolac Tromethamine] GI Intolerance     Projectile vomiting  "    Latex Other (See Comments)     Latex allergy     Tape Rash       Current Outpatient Medications   Medication Sig Dispense Refill    albuterol sulfate  (90 Base) MCG/ACT inhaler Inhale 2 puffs Every 4 (Four) Hours As Needed for Wheezing. 18 g 11    B Complex Vitamins (vitamin b complex) capsule capsule Take 1 capsule by mouth Daily. OTC      ferrous sulfate 325 (65 FE) MG tablet Take 1 tablet by mouth Daily With Breakfast. OTC      fludrocortisone 0.1 MG tablet Take 1 tablet by mouth Daily. 90 tablet 1    fluticasone (VERAMYST) 27.5 MCG/SPRAY nasal spray 2 sprays into the nostril(s) as directed by provider 1 (One) Time As Needed for Rhinitis or Allergies. 6 mL 2    Fluticasone-Umeclidin-Vilant (Trelegy Ellipta) 100-62.5-25 MCG/ACT inhaler Inhale 1 puff Daily. 3 each 3    HYDROcodone-acetaminophen (Norco) 7.5-325 MG per tablet Take 1 tablet by mouth Every 6 (Six) Hours As Needed for Moderate Pain. (Patient not taking: Reported on 7/10/2024) 60 tablet 0    lidocaine-prilocaine (EMLA) 2.5-2.5 % cream Apply 1 application  topically to the appropriate area as directed As Needed (45-60 minutes prior to port access.  Cover with saran/plastic wrap.). 30 g 3    magnesium chloride ER 64 MG DR tablet Take 1 tablet by mouth Daily.      megestrol (MEGACE) 40 MG tablet Take 1 tablet by mouth 2 (Two) Times a Day for 120 days. 60 tablet 3    midodrine (PROAMATINE) 10 MG tablet Take 1 tablet by mouth 3 (Three) Times a Day As Needed (Systolic BP less then 100). 30 tablet 0    omeprazole (priLOSEC) 40 MG capsule Take 1 capsule by mouth Daily. 30 capsule 5    ondansetron (ZOFRAN) 8 MG tablet Take 1 tablet by mouth 3 (Three) Times a Day As Needed for Nausea or Vomiting. 30 tablet 2    pravastatin (PRAVACHOL) 80 MG tablet Take 1 tablet by mouth Every Night. 90 tablet 1    sodium bicarbonate 650 MG tablet Take 2 tablets by mouth 3 times a day.       No current facility-administered medications for this visit.       Review of  "Systems  All other systems were reviewed and are negative.  Exceptions are included in the HPI or as otherwise noted above.     Objective   Blood pressure 140/84, pulse 86, temperature 98.5 °F (36.9 °C), temperature source Infrared, height 182.9 cm (72.01\"), weight 76.7 kg (169 lb), SpO2 99%.  Physical Exam    Physical Exam:     Constitutional:    Alert, cooperative, in no acute distress   Head:    Normocephalic, without obvious abnormality, atraumatic   Eyes:            Lids and lashes normal, conjunctivae and sclerae normal, no   icterus, no pallor, corneas clear, PER   ENMT:   Ears appear intact with no abnormalities noted        Neck:   No adenopathy, supple, trachea midline, no thyromegaly, no JVD       Lungs/Resp:     Normal effort, symmetric chest rise, no crepitus, clear bilateral, no chest wall tenderness                 Heart/CV:    Regular rhythm and normal rate, normal S1 and S2, no            murmur   Abdomen/GI:   Nondistended, normal bowel sounds   :     Deferred   Extremities/MSK:   No clubbing or cyanosis.  No edema.  Normal tone.  No deformities.    Pulses:   Pulses palpable and equal bilaterally   Skin:   No bleeding, bruising or rash   Heme/Lymph:   No cervical or supraclavicular adenopathy.    Neurologic:    Psychiatric:       Moves all extremities with no obvious focal motor deficit.  Cranial nerves 2 - 12 grossly intact   Oriented x 3, normal affect.          The above findings are documentation of my personal physical examination from today.  Electronically signed by:  Octaviano Sampson MD  08/19/24  17:51 EDT      PFTs:  Full pulmonary function testing was done on 8/19/2024.  Please see scanned PFT report for complete details.  FVC was 3.39 L or 78% predicted.  FEV1 was 2.38 L or 74% predicted.  FEV1 to FVC ratio 70%.  Excellent evaluation 79 L/min or 67% predicted.  Total lung capacity 5.92 L or 78% predicted.  Residual volume 4 5 3 L or 89% predicted.  DLCO 58% predicted.  DLCO/VA 69% " predicted.    Interpretation: No obstruction.  Mild restriction.  Low maximal voluntary ventilation, which may be effort related.  No air trapping or hyperinflation.  Low DLCO.  Compared to prior study on 8/29/2023, FEV1 is decreased by 350 mL.    Full pulmonary function testing was done on 8/29/2023.  Please see scanned PFT report for complete details.  FVC was 3.62 L or 78% predicted.  FEV1 was 2.43 L or 72% predicted.  FEV1 FVC ratio 67%.  Postbronchodilator FEV1 2.78 L or 83% predicted, a 14% increase from prebronchodilator.  Maximal voluntary ventilation was 89 L/min or 69% predicted.  Total lung capacity was 6.45 L or 93% predicted.  Residual volume 2.74 L or 100% predicted.  DLCO 91% predicted.    Interpretation: Moderate obstruction with significant response to bronchodilator.  Low maximal voluntary ventilation.  No restriction.  No air trapping or hyperinflation.  Normal DLCO.  No prior study available for comparison.  Study is consistent with stage II COPD with asthmatic component.  I suspect there is some hilar adenopathy.  There is evidence of prior granulomatous disease.    Imaging: I reviewed both the images and radiologist's report.    PET CT scan from 6/20/2024 was reviewed.  I reviewed both the images and the radiologist's report.  There were some borderline metabolically active mediastinal and supraclavicular lymph nodes.  Radiologist's impression follows:    Impression:  Similar appearance of some mildly FDG avid, nonenlarged mediastinal and hilar nodes and right supraclavicular node. This is somewhat nonspecific. Otherwise no findings to suggest metastatic disease.    Chest x-ray 2/2/2024 reviewed and shows a right-sided pacemaker in place with postsurgical changes to the chest from right lower lobectomy including stable small right-sided pleural effusion and basilar scarring.    I reviewed again prior CT scans of the chest from August 10, 2023, July 19, 2022, July 20, 2021, January 22, 2020,  July 15, 2019, and January 4, 2019.  Patient has a cavitary right lower lobe superior segment lung nodule that appears to have grown from the most recent CT scan and become more dense.  Looking back on prior CT scans this area has had varying densities and appears to have an underlying cystic structure.    Assessment & Plan   Problems Addressed this Visit          Hematology and Neoplasia    Malignant neoplasm of lower lobe of right lung       Pulmonary and Pneumonias    Centrilobular emphysema - Primary    Relevant Orders    Spirometry with Diffusion Capacity & Lung Volumes (Completed)       Tobacco    Tobacco abuse     Diagnoses         Codes Comments    Centrilobular emphysema    -  Primary ICD-10-CM: J43.2  ICD-9-CM: 492.8     Malignant neoplasm of lower lobe of right lung     ICD-10-CM: C34.31  ICD-9-CM: 162.5     Tobacco abuse     ICD-10-CM: Z72.0  ICD-9-CM: 305.1             Discussion:    74 y.o. male active smoker of 1/2 PPD up to 2 PPD for 50 years with a history of HTN, HL, here for follow-up evaluation of a right sided lung nodule.  Had bronchoscopy/EBUS in 2020 at the [facility] that was reportedly benign.  He worked in construction up to age 71.  Had PPM in 2021 by Dr. Box and is followed by Dr. Pop.      I performed a bronchoscopy with EBUS on 9/15/2023.  Results from biopsies showed a nondiagnostic sample from the right lower lobe however the station 7 lymph node was positive for non-small cell carcinoma.  Patient has undergone a right lower lobe lobectomy by Dr. Patel on 1/9/2024.  He received neoadjuvant chemotherapy/immunotherapy under the direction of Dr. Ashley.  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  No evidence of malignancy in the lymph nodes.  He has done quite well postoperatively.  Currently off of immunotherapy secondary to reaction.  Follow-up Dr. Ashley with a plan for upcoming PET/CT and MRI brain.    Plan:  1.  For right lower lobe superior  segment adenosquamous carcinoma: Status post bronchoscopy 9/15/2023 with station 7 lymph node positive for non-small cell carcinoma.  Subsequently underwent neoadjuvant chemoimmunotherapy followed by right lower lobectomy by Dr. Patel 1/9/2024.  Follows with Dr. Ashley from oncology.  Received immunotherapy but currently off immunotherapy secondary to reaction/mineralocorticoid deficiency.  2.  For mediastinal adenopathy: Status post EBUS with station 7 positive for non-small cell carcinoma.  Negative path on resection after neoadjuvant chemoimmunotherapy.  Repeat PET CT pending.  Followed by Dr. Ashley.  3.  For COPD with asthmatic component: Continue Trelegy 100.  Albuterol rescue inhaler 2 puffs every 4-6 hours as needed.  4.  For history of tobacco use presenting hazards to health: Continue imaging follow-up (per oncology at this point).  Unfortunately patient has started smoking again.  Counseled him on smoking cessation.  5.  For health maintenance: Recommend yearly influenza vaccination.  Recommend pneumonia vaccination.  Patient currently up-to-date on pneumonia/influenza.  Would recommend COVID/RSV vaccinations.         Immunization History   Administered Date(s) Administered    ABRYSVO (RSV, 60+ or pregnant women 32-36 wks) 10/16/2023    COVID-19 (PFIZER) BIVALENT 12+YRS 09/16/2022    COVID-19 (PFIZER) Purple Cap Monovalent 01/29/2021, 02/19/2021, 10/18/2021, 04/14/2022    COVID-19 F23 (PFIZER) 12YRS+ (COMIRNATY) 09/26/2023    Fluzone High-Dose 65+yrs 10/06/2023    Pneumococcal Conjugate 20-Valent (PCV20) 10/11/2023       Social History     Tobacco Use   Smoking Status Every Day    Current packs/day: 0.50    Average packs/day: 0.5 packs/day for 56.6 years (28.8 ttl pk-yrs)    Types: Cigarettes    Start date: 1/1/1968   Smokeless Tobacco Never   Tobacco Comments    Still smoke        David Barfield  reports that he has been smoking cigarettes. He started smoking about 56 years ago. He has a 28.8  pack-year smoking history. He has never used smokeless tobacco..             Advance Care Planning   ACP discussion was held with the patient during this visit. Patient has an advance directive in EMR which is still valid.  [facility] form given 8/29/2023.        I personally spent a total of 36 minutes on patient visit today including chart review, face to face with the patient obtaining the history and physical exam, review of pertinent images and tests, counseling and discussion and/or coordination of care as described above, and documentation.  Total time excludes time spent on other separate services such as performing procedures or test interpretation, if applicable.      Electronically signed by:  Octaviano Sampson MD  08/19/24  17:51 EDT    Please note that portions of this note were completed with [employer] - a voice recognition program.

## 2024-08-22 ENCOUNTER — TELEPHONE (OUTPATIENT)
Dept: ONCOLOGY | Facility: CLINIC | Age: 74
End: 2024-08-22

## 2024-09-18 ENCOUNTER — HOSPITAL ENCOUNTER (OUTPATIENT)
Dept: ONCOLOGY | Facility: HOSPITAL | Age: 74
Discharge: HOME | End: 2024-09-18
Payer: MEDICARE

## 2024-09-18 ENCOUNTER — HOSPITAL ENCOUNTER (OUTPATIENT)
Dept: PET IMAGING | Facility: HOSPITAL | Age: 74
Discharge: HOME | End: 2024-09-18
Payer: MEDICARE

## 2024-09-18 VITALS
HEART RATE: 92 BPM | SYSTOLIC BLOOD PRESSURE: 101 MMHG | DIASTOLIC BLOOD PRESSURE: 55 MMHG | WEIGHT: 174 LBS | TEMPERATURE: 96.4 F | RESPIRATION RATE: 16 BRPM | BODY MASS INDEX: 23.57 KG/M2 | HEIGHT: 72 IN

## 2024-09-18 DIAGNOSIS — C34.31 MALIGNANT NEOPLASM OF LOWER LOBE OF RIGHT LUNG: Primary | ICD-10-CM

## 2024-09-18 DIAGNOSIS — C34.31 MALIGNANT NEOPLASM OF LOWER LOBE OF RIGHT LUNG: ICD-10-CM

## 2024-09-18 LAB — GLUCOSE BLDC GLUCOMTR-MCNC: 109 MG/DL (ref 70–130)

## 2024-09-18 PROCEDURE — 0 FLUDEOXYGLUCOSE F18 SOLUTION: Performed by: INTERNAL MEDICINE

## 2024-09-18 PROCEDURE — 78815 PET IMAGE W/CT SKULL-THIGH: CPT

## 2024-09-18 PROCEDURE — A9552 F18 FDG: HCPCS | Performed by: INTERNAL MEDICINE

## 2024-09-18 PROCEDURE — 25010000002 HEPARIN LOCK FLUSH PER 10 UNITS: Performed by: INTERNAL MEDICINE

## 2024-09-18 PROCEDURE — 82948 REAGENT STRIP/BLOOD GLUCOSE: CPT

## 2024-09-18 PROCEDURE — 96523 IRRIG DRUG DELIVERY DEVICE: CPT

## 2024-09-18 RX ORDER — HEPARIN SODIUM (PORCINE) LOCK FLUSH IV SOLN 100 UNIT/ML 100 UNIT/ML
500 SOLUTION INTRAVENOUS AS NEEDED
Status: DISCONTINUED | OUTPATIENT
Start: 2024-09-18 | End: 2024-09-19 | Stop reason: HOSPADM

## 2024-09-18 RX ORDER — SODIUM CHLORIDE 0.9 % (FLUSH) 0.9 %
10 SYRINGE (ML) INJECTION AS NEEDED
OUTPATIENT
Start: 2024-09-18

## 2024-09-18 RX ORDER — HEPARIN SODIUM (PORCINE) LOCK FLUSH IV SOLN 100 UNIT/ML 100 UNIT/ML
500 SOLUTION INTRAVENOUS AS NEEDED
OUTPATIENT
Start: 2024-09-18

## 2024-09-18 RX ADMIN — HEPARIN 500 UNITS: 100 SYRINGE at 14:05

## 2024-09-18 RX ADMIN — FLUDEOXYGLUCOSE F 18 1 DOSE: 200 INJECTION, SOLUTION INTRAVENOUS at 10:43

## 2024-09-25 ENCOUNTER — HOSPITAL ENCOUNTER (OUTPATIENT)
Dept: MRI IMAGING | Facility: HOSPITAL | Age: 74
Discharge: HOME | End: 2024-09-25
Admitting: INTERNAL MEDICINE
Payer: MEDICARE

## 2024-09-25 VITALS
SYSTOLIC BLOOD PRESSURE: 175 MMHG | RESPIRATION RATE: 19 BRPM | OXYGEN SATURATION: 100 % | DIASTOLIC BLOOD PRESSURE: 95 MMHG | HEART RATE: 80 BPM

## 2024-09-25 DIAGNOSIS — C34.31 MALIGNANT NEOPLASM OF LOWER LOBE OF RIGHT LUNG: ICD-10-CM

## 2024-09-25 PROCEDURE — 70553 MRI BRAIN STEM W/O & W/DYE: CPT

## 2024-09-25 PROCEDURE — A9577 INJ MULTIHANCE: HCPCS | Performed by: INTERNAL MEDICINE

## 2024-09-25 PROCEDURE — 0 GADOBENATE DIMEGLUMINE 529 MG/ML SOLUTION: Performed by: INTERNAL MEDICINE

## 2024-09-25 RX ADMIN — GADOBENATE DIMEGLUMINE 15 ML: 529 INJECTION, SOLUTION INTRAVENOUS at 11:40

## 2024-10-07 ENCOUNTER — LAB (OUTPATIENT)
Dept: LAB | Facility: HOSPITAL | Age: 74
End: 2024-10-07
Payer: MEDICARE

## 2024-10-07 ENCOUNTER — TRANSCRIBE ORDERS (OUTPATIENT)
Dept: LAB | Facility: HOSPITAL | Age: 74
End: 2024-10-07
Payer: MEDICARE

## 2024-10-07 DIAGNOSIS — D64.9 ANEMIA DUE TO UNKNOWN MECHANISM: ICD-10-CM

## 2024-10-07 DIAGNOSIS — D64.9 ANEMIA DUE TO UNKNOWN MECHANISM: Primary | ICD-10-CM

## 2024-10-07 DIAGNOSIS — N17.9 ACUTE RENAL FAILURE, UNSPECIFIED ACUTE RENAL FAILURE TYPE: ICD-10-CM

## 2024-10-07 LAB
BACTERIA UR QL AUTO: NORMAL /HPF
BASOPHILS # BLD AUTO: 0.03 10*3/MM3 (ref 0–0.2)
BASOPHILS NFR BLD AUTO: 0.3 % (ref 0–1.5)
BILIRUB UR QL STRIP: NEGATIVE
CLARITY UR: ABNORMAL
COLOR UR: YELLOW
DEPRECATED RDW RBC AUTO: 44.3 FL (ref 37–54)
EOSINOPHIL # BLD AUTO: 0.04 10*3/MM3 (ref 0–0.4)
EOSINOPHIL NFR BLD AUTO: 0.4 % (ref 0.3–6.2)
ERYTHROCYTE [DISTWIDTH] IN BLOOD BY AUTOMATED COUNT: 12.7 % (ref 12.3–15.4)
GLUCOSE UR STRIP-MCNC: NEGATIVE MG/DL
HCT VFR BLD AUTO: 37.7 % (ref 37.5–51)
HGB BLD-MCNC: 12.3 G/DL (ref 13–17.7)
HGB UR QL STRIP.AUTO: NEGATIVE
HYALINE CASTS UR QL AUTO: NORMAL /LPF
IMM GRANULOCYTES # BLD AUTO: 0.03 10*3/MM3 (ref 0–0.05)
IMM GRANULOCYTES NFR BLD AUTO: 0.3 % (ref 0–0.5)
KETONES UR QL STRIP: NEGATIVE
LEUKOCYTE ESTERASE UR QL STRIP.AUTO: NEGATIVE
LYMPHOCYTES # BLD AUTO: 2.27 10*3/MM3 (ref 0.7–3.1)
LYMPHOCYTES NFR BLD AUTO: 22.9 % (ref 19.6–45.3)
MCH RBC QN AUTO: 31.7 PG (ref 26.6–33)
MCHC RBC AUTO-ENTMCNC: 32.6 G/DL (ref 31.5–35.7)
MCV RBC AUTO: 97.2 FL (ref 79–97)
MONOCYTES # BLD AUTO: 1.11 10*3/MM3 (ref 0.1–0.9)
MONOCYTES NFR BLD AUTO: 11.2 % (ref 5–12)
NEUTROPHILS NFR BLD AUTO: 6.43 10*3/MM3 (ref 1.7–7)
NEUTROPHILS NFR BLD AUTO: 64.9 % (ref 42.7–76)
NITRITE UR QL STRIP: NEGATIVE
NRBC BLD AUTO-RTO: 0 /100 WBC (ref 0–0.2)
PH UR STRIP.AUTO: 8 [PH] (ref 5–8)
PLATELET # BLD AUTO: 282 10*3/MM3 (ref 140–450)
PMV BLD AUTO: 8.9 FL (ref 6–12)
PROT UR QL STRIP: ABNORMAL
RBC # BLD AUTO: 3.88 10*6/MM3 (ref 4.14–5.8)
RBC # UR STRIP: NORMAL /HPF
REF LAB TEST METHOD: NORMAL
SP GR UR STRIP: 1.01 (ref 1–1.03)
SQUAMOUS #/AREA URNS HPF: NORMAL /HPF
UROBILINOGEN UR QL STRIP: ABNORMAL
WBC # UR STRIP: NORMAL /HPF
WBC NRBC COR # BLD AUTO: 9.91 10*3/MM3 (ref 3.4–10.8)

## 2024-10-07 PROCEDURE — 82570 ASSAY OF URINE CREATININE: CPT

## 2024-10-07 PROCEDURE — 80069 RENAL FUNCTION PANEL: CPT

## 2024-10-07 PROCEDURE — 84466 ASSAY OF TRANSFERRIN: CPT

## 2024-10-07 PROCEDURE — 84156 ASSAY OF PROTEIN URINE: CPT

## 2024-10-07 PROCEDURE — 81001 URINALYSIS AUTO W/SCOPE: CPT | Performed by: STUDENT IN AN ORGANIZED HEALTH CARE EDUCATION/TRAINING PROGRAM

## 2024-10-07 PROCEDURE — 36415 COLL VENOUS BLD VENIPUNCTURE: CPT

## 2024-10-07 PROCEDURE — 83540 ASSAY OF IRON: CPT

## 2024-10-07 PROCEDURE — 85025 COMPLETE CBC W/AUTO DIFF WBC: CPT

## 2024-10-07 PROCEDURE — 82728 ASSAY OF FERRITIN: CPT

## 2024-10-08 LAB
ALBUMIN SERPL-MCNC: 4 G/DL (ref 3.5–5.2)
ANION GAP SERPL CALCULATED.3IONS-SCNC: 10 MMOL/L (ref 5–15)
BUN SERPL-MCNC: 15 MG/DL (ref 8–23)
BUN/CREAT SERPL: 9.6 (ref 7–25)
CALCIUM SPEC-SCNC: 9.8 MG/DL (ref 8.6–10.5)
CHLORIDE SERPL-SCNC: 107 MMOL/L (ref 98–107)
CO2 SERPL-SCNC: 24 MMOL/L (ref 22–29)
CREAT SERPL-MCNC: 1.57 MG/DL (ref 0.76–1.27)
CREAT UR-MCNC: 91.9 MG/DL
EGFRCR SERPLBLD CKD-EPI 2021: 45.7 ML/MIN/1.73
FERRITIN SERPL-MCNC: 133 NG/ML (ref 30–400)
GLUCOSE SERPL-MCNC: 73 MG/DL (ref 65–99)
IRON 24H UR-MRATE: 106 MCG/DL (ref 59–158)
IRON SATN MFR SERPL: 32 % (ref 20–50)
PHOSPHATE SERPL-MCNC: 3.7 MG/DL (ref 2.5–4.5)
POTASSIUM SERPL-SCNC: 3.9 MMOL/L (ref 3.5–5.2)
PROT ?TM UR-MCNC: 11.3 MG/DL
SODIUM SERPL-SCNC: 141 MMOL/L (ref 136–145)
TIBC SERPL-MCNC: 328 MCG/DL (ref 298–536)
TRANSFERRIN SERPL-MCNC: 220 MG/DL (ref 200–360)

## 2024-10-09 ENCOUNTER — OFFICE VISIT (OUTPATIENT)
Dept: ONCOLOGY | Facility: CLINIC | Age: 74
End: 2024-10-09
Payer: MEDICARE

## 2024-10-09 VITALS
WEIGHT: 178 LBS | TEMPERATURE: 97.8 F | OXYGEN SATURATION: 99 % | HEART RATE: 78 BPM | SYSTOLIC BLOOD PRESSURE: 164 MMHG | HEIGHT: 72 IN | BODY MASS INDEX: 24.11 KG/M2 | DIASTOLIC BLOOD PRESSURE: 83 MMHG

## 2024-10-09 DIAGNOSIS — C34.31 MALIGNANT NEOPLASM OF LOWER LOBE OF RIGHT LUNG: Primary | ICD-10-CM

## 2024-10-09 PROCEDURE — 3077F SYST BP >= 140 MM HG: CPT | Performed by: INTERNAL MEDICINE

## 2024-10-09 PROCEDURE — 3079F DIAST BP 80-89 MM HG: CPT | Performed by: INTERNAL MEDICINE

## 2024-10-09 PROCEDURE — 99214 OFFICE O/P EST MOD 30 MIN: CPT | Performed by: INTERNAL MEDICINE

## 2024-10-09 PROCEDURE — 1126F AMNT PAIN NOTED NONE PRSNT: CPT | Performed by: INTERNAL MEDICINE

## 2024-10-14 ENCOUNTER — TELEPHONE (OUTPATIENT)
Dept: INTERNAL MEDICINE | Facility: CLINIC | Age: 74
End: 2024-10-14

## 2024-10-14 NOTE — TELEPHONE ENCOUNTER
Caller: ISAÍAS ANDERSON    Relationship to patient: Emergency Contact    Best call back number: [phone]     Type of visit: SUBS MWV    Requested date: 111924 4 PM    If rescheduling, when is the original appointment: 10.17.24 3.30 PM

## 2024-10-20 NOTE — PROGRESS NOTES
Hematology and Oncology [place]  Office number [phone]    Fax number [phone]     Follow up     Date: 10/20/24    Patient Name: David Barfield  MRN: [id number]  : 1950    Referring Physician: Dr. Sampson    Chief Complaint: Nonsmall cell lung cancer follow-up on treatment    Cancer Staging:  Cancer Staging   Stage IIIA (cT2b, cN2, cM0)    History of Present Illness: David Barfield is a pleasant 74 y.o. male who presents today for evaluation of NSCLC. He has a 50 pack year smoking history.    He has a history of a PET avid subpleural RLL lung nod[facility] [place] [facility]. This had an SUV of 9.8 on PET  in association with increased mediastinal LN uptake with SUV around 3. Imaging was felt secondary to osteophyte fibrosis. He did undergo bronchoscopy 2020 with negative cytology. The RLL lung nodule was not felt amenable to bronchoscopy biopsy.    CT lung screen 2023 showed:      PET CT showed mass in the RLL intensely SUV avid, max 17. Mediastinal LN not hypermetabolic above baseline.     Right lower lobe robotic transbronchial biopsy and cytology on 9/15/2023 showed benign findings. Cultures including AFT and fungal were negative.  Station 11L, Station 4L LN negative  Station 7 LN showed NSCLCa (positive for cytokeratin 7, p63, and TTF-1 with no significant staining for p40 or Napsin. The staining pattern raises the possibility of mixed adenocarcinoma and squamous cell carcinoma differentiation in this tumor). PDL1 negative.    Tempus negative for targetable mutations on liquid biopsy. QNS on tissue at diagnosis.    MRI brain was negative.    He has a history of sick sinus syndrome s/p PPM and moderate COPD. He describes only mild physical limitations from this. For example he recently walked 1.5 miles at [facility]. At home, he climbs 17 steps without issue. He does sit down with long activities.     Following neoadjuvant chemoimmunotherapy  he underwent lobectomy and mediastinal lymph node dissection on January 9, 2024.  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  Margins were negative.  Regional lymph nodes including 4R, 12 R, and 7 were negative as were 5 intralobar lymph nodes.    Treatment history:  Carbo, taxol, nivo, C1 10/24/23; C2 11/15/23; C3 11/21/23  Atezolizumab maintenance, cycle 1 2/6/2024; cycle 2 2/27/24: stopped for intolerance (nausea, malaise, poor QOL) but no severe immune events  Opdivo maintenance, cycle 1: 4/4/24: terminated for ARACELI    Interval history:   Mr. Barfield is here in the company of his significant other for follow-up. Feeling well. Back to traveling, no recent admission. Fatigued but otherwise doing well. No respiratory symptoms    Past Medical History:   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    Coronary artery disease 2019    Diabetes mellitus Borderline    Emphysema/COPD     Erectile disorder     GERD (gastroesophageal reflux disease)     History of chemotherapy     Hyperlipidemia     Hypertension 2019    Lung nodule     Mumps     Mumps     Pruritus     after bath    Slow to wake up after anesthesia     Stage 5 chronic kidney disease not on chronic dialysis 5/14/2024    Wears dentures     upper only    Wears hearing aid in both ears     usually only wears right   No personal history of myocardial infarction, cerebrovascular event, or venous thromboembolism.  No autoimmune diseases.   No prior cscope, mailed cologuard recently    Past Surgical History:   Past Surgical History:   Procedure Laterality Date    BONE BIOPSY      broken bone surgery in his face    BRONCHOSCOPY THORACOTOMY Right 01/09/2024    Procedure: THORACOTOMY FOR LOWER LOBECTOMY AND MEDISTINAL LYMPH NODE  DISSECTION RIGHT;  Surgeon: Joey Patel MD;  Location:  [facility];  Service: Cardiothoracic;  Laterality: Right;    BRONCHOSCOPY WITH ION ROBOTIC ASSIST N/A 09/15/2023    Procedure: BRONCHOSCOPY NAVIGATION WITH ENDOBRONCHIAL ULTRASOUND AND ION ROBOT;  Surgeon: Octaviano Sampson MD;  Location:  [facility];  Service: Robotics - Pulmonary;  Laterality: N/A;  ion #6 - 0032  - 0015  Cath guide 0061    EBUS balloon removed and intact    CARDIAC ELECTROPHYSIOLOGY PROCEDURE N/A 08/17/2021    Procedure: Pacemaker DC new;  Surgeon: Kayy Box MD;  Location:  [facility];  Service: Cardiology;  Laterality: N/A;    FACIAL FRACTURE SURGERY      LYMPH NODE BIOPSY  2023    PACEMAKER IMPLANTATION       Family History:   Family History   Problem Relation Age of Onset    Aneurysm Mother         brain    Dementia Father     Leukemia Sister     Heart disease Paternal Grandmother     Hypertension Paternal Grandfather     Cancer Sister    Sister leukemia at age 21  No other malignancy    Social History:   Social History     Socioeconomic History    Marital status: Single    Number of children: 3   Tobacco Use    Smoking status: Every Day     Current packs/day: 0.50     Average packs/day: 0.5 packs/day for 56.8 years (28.9 ttl pk-yrs)     Types: Cigarettes     Start date: 1/1/1968    Smokeless tobacco: Never    Tobacco comments:     Still smoke   Vaping Use    Vaping status: Never Used   Substance and Sexual Activity    Alcohol use: Never    Drug use: Never    Sexual activity: Yes     Partners: Female     Birth control/protection: None   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 OTC, Disp: , Rfl:     ferrous sulfate 325 (65 FE) MG tablet, Take 1 tablet by mouth Daily With Breakfast. OTC, Disp: , Rfl:     fludrocortisone 0.1 MG tablet, Take 1 tablet by mouth Daily., Disp: 90 tablet, Rfl: 1    fluticasone (VERAMYST) 27.5 MCG/SPRAY nasal spray, 2 sprays into the nostril(s) as directed by provider 1 (One) Time As Needed for Rhinitis or Allergies., Disp: 6 mL, Rfl: 2    Fluticasone-Umeclidin-Vilant (Trelegy Ellipta) 100-62.5-25 MCG/ACT inhaler, Inhale 1 puff Daily., Disp: 3 each, Rfl: 3    HYDROcodone-acetaminophen (Norco) 7.5-325 MG per tablet, Take 1 tablet by mouth Every 6 (Six) Hours As Needed for Moderate Pain. (Patient not taking: Reported on 7/10/2024), Disp: 60 tablet, Rfl: 0    lidocaine-prilocaine (EMLA) 2.5-2.5 % cream, Apply 1 application  topically to the appropriate area as directed As Needed (45-60 minutes prior to port access.  Cover with saran/plastic wrap.)., Disp: 30 g, Rfl: 3    magnesium chloride ER 64 MG DR tablet, Take 1 tablet by mouth Daily., Disp: , Rfl:     megestrol (MEGACE) 40 MG tablet, Take 1 tablet by mouth 2 (Two) Times a Day for 120 days., Disp: 60 tablet, Rfl: 3    midodrine (PROAMATINE) 10 MG tablet, Take 1 tablet by mouth 3 (Three) Times a Day As Needed (Systolic BP less then 100)., Disp: 30 tablet, Rfl: 0    omeprazole (priLOSEC) 40 MG capsule, Take 1 capsule by mouth Daily., Disp: 30 capsule, Rfl: 5    ondansetron (ZOFRAN) 8 MG tablet, Take 1 tablet by mouth 3 (Three) Times a Day As Needed for Nausea or Vomiting., Disp: 30 tablet, Rfl: 2    pravastatin (PRAVACHOL) 80 MG tablet, Take 1 tablet by mouth Every Night., Disp: 90 tablet, Rfl: 1    sodium bicarbonate 650 MG tablet, Take 2 tablets by mouth 3 times a day., Disp: , Rfl:     Allergies:   Allergies   Allergen Reactions    Cymbalta [Duloxetine Hcl] GI Intolerance    Gabapentin Mental Status Change     Pt states that this medication \"makes him feel foolish in his head\".     Remeron " "[Mirtazapine] Other (See Comments)     Excess sedation    Toradol [Ketorolac Tromethamine] GI Intolerance     Projectile vomiting     Latex Other (See Comments)     Latex allergy     Tape Rash       Objective     Vital Signs:   Vitals:    10/09/24 1447   BP: 164/83   Pulse: 78   Temp: 97.8 °F (36.6 °C)   TempSrc: Temporal   SpO2: 99%   Weight: 80.7 kg (178 lb)   Height: 182.9 cm (72.01\")   PainSc: 0-No pain    Body mass index is 24.14 kg/m².   Pain Score    10/09/24 1447   PainSc: 0-No pain       ECOG Performance Status: 1 - Symptomatic but completely ambulatory    Physical Exam:   General: No acute distress.   HEENT: Normocephalic, atraumatic. Sclera anicteric.   Neck: supple, no adenopathy.   Cardiovascular: RRR no murmurs  Respiratory: Clear to auscultation bilaterally.  Abdomen: Soft, nontender, non distended with normoactive bowel sounds  Lymph: no cervical, supraclavicular or axillary adenopathy  Neuro: Alert and oriented x 3. No focal deficits.   Ext: Symmetric, no swelling.   Accurate as of 10/2024    Laboratory/Imaging Reviewed:   Lab on 10/07/2024   Component Date Value Ref Range Status    Iron 10/07/2024 106  59 - 158 mcg/dL Final    Iron Saturation (TSAT) 10/07/2024 32  20 - 50 % Final    Transferrin 10/07/2024 220  200 - 360 mg/dL Final    TIBC 10/07/2024 328  298 - 536 mcg/dL Final    Glucose 10/07/2024 73  65 - 99 mg/dL Final    BUN 10/07/2024 15  8 - 23 mg/dL Final    Creatinine 10/07/2024 1.57 (H)  0.76 - 1.27 mg/dL Final    Sodium 10/07/2024 141  136 - 145 mmol/L Final    Potassium 10/07/2024 3.9  3.5 - 5.2 mmol/L Final    Chloride 10/07/2024 107  98 - 107 mmol/L Final    CO2 10/07/2024 24.0  22.0 - 29.0 mmol/L Final    Calcium 10/07/2024 9.8  8.6 - 10.5 mg/dL Final    Albumin 10/07/2024 4.0  3.5 - 5.2 g/dL Final    Phosphorus 10/07/2024 3.7  2.5 - 4.5 mg/dL Final    Anion Gap 10/07/2024 10.0  5.0 - 15.0 mmol/L Final    BUN/Creatinine Ratio 10/07/2024 9.6  7.0 - 25.0 Final    eGFR 10/07/2024 45.7 " (L)  >60.0 mL/min/1.73 Final    Total Protein, Urine 10/07/2024 11.3  mg/dL Final    Creatinine, Urine 10/07/2024 91.9  mg/dL Final    Ferritin 10/07/2024 133.00  30.00 - 400.00 ng/mL Final    WBC 10/07/2024 9.91  3.40 - 10.80 10*3/mm3 Final    RBC 10/07/2024 3.88 (L)  4.14 - 5.80 10*6/mm3 Final    Hemoglobin 10/07/2024 12.3 (L)  13.0 - 17.7 g/dL Final    Hematocrit 10/07/2024 37.7  37.5 - 51.0 % Final    MCV 10/07/2024 97.2 (H)  79.0 - 97.0 fL Final    MCH 10/07/2024 31.7  26.6 - 33.0 pg Final    MCHC 10/07/2024 32.6  31.5 - 35.7 g/dL Final    RDW 10/07/2024 12.7  12.3 - 15.4 % Final    RDW-SD 10/07/2024 44.3  37.0 - 54.0 fl Final    MPV 10/07/2024 8.9  6.0 - 12.0 fL Final    Platelets 10/07/2024 282  140 - 450 10*3/mm3 Final    Neutrophil % 10/07/2024 64.9  42.7 - 76.0 % Final    Lymphocyte % 10/07/2024 22.9  19.6 - 45.3 % Final    Monocyte % 10/07/2024 11.2  5.0 - 12.0 % Final    Eosinophil % 10/07/2024 0.4  0.3 - 6.2 % Final    Basophil % 10/07/2024 0.3  0.0 - 1.5 % Final    Immature Grans % 10/07/2024 0.3  0.0 - 0.5 % Final    Neutrophils, Absolute 10/07/2024 6.43  1.70 - 7.00 10*3/mm3 Final    Lymphocytes, Absolute 10/07/2024 2.27  0.70 - 3.10 10*3/mm3 Final    Monocytes, Absolute 10/07/2024 1.11 (H)  0.10 - 0.90 10*3/mm3 Final    Eosinophils, Absolute 10/07/2024 0.04  0.00 - 0.40 10*3/mm3 Final    Basophils, Absolute 10/07/2024 0.03  0.00 - 0.20 10*3/mm3 Final    Immature Grans, Absolute 10/07/2024 0.03  0.00 - 0.05 10*3/mm3 Final    nRBC 10/07/2024 0.0  0.0 - 0.2 /100 WBC Final   Transcribe Orders on 10/07/2024   Component Date Value Ref Range Status    Color, UA 10/07/2024 Yellow  Yellow, Straw Final    Appearance, UA 10/07/2024 Cloudy (A)  Clear Final    pH, UA 10/07/2024 8.0  5.0 - 8.0 Final    Specific Gravity, UA 10/07/2024 1.014  1.005 - 1.030 Final    Glucose, UA 10/07/2024 Negative  Negative Final    Ketones, UA 10/07/2024 Negative  Negative Final    Bilirubin, UA 10/07/2024 Negative  Negative Final     Blood, UA 10/07/2024 Negative  Negative Final    Protein, UA 10/07/2024 Trace (A)  Negative Final    Leuk Esterase, UA 10/07/2024 Negative  Negative Final    Nitrite, UA 10/07/2024 Negative  Negative Final    Urobilinogen, UA 10/07/2024 0.2 E.U./dL  0.2 - 1.0 E.U./dL Final    RBC, UA 10/07/2024 0-2  None Seen, 0-2 /HPF Final    WBC, UA 10/07/2024 0-2  None Seen, 0-2 /HPF Final    Bacteria, UA 10/07/2024 None Seen  None Seen /HPF Final    Squamous Epithelial Cells, UA 10/07/2024 0-2  None Seen, 0-2 /HPF Final    Hyaline Casts, UA 10/07/2024 None Seen  None Seen /LPF Final    Methodology 10/07/2024 Automated Microscopy   Final       MRI Brain With & Without Contrast    Result Date: 9/25/2024  Narrative: MRI BRAIN W WO CONTRAST Date of Exam: 9/25/2024 11:30 AM EDT Indication: lung cancer.  Comparison: 3/7/2024. Technique:  Routine multiplanar/multisequence sequence images of the brain were obtained before and after the uneventful administration of 15 mL Multihance. Findings: No acute infarct is present on diffusion weighted sequences. Midline structures are normal and the craniocervical junction appears satisfactory. Age-related changes are present with moderate generalized volume loss and mild typical T2 hyperintense subcortical and pontine white matter changes, nonspecific but favored to reflect sequela of chronic microvascular ischemia. There is otherwise no evidence of intracranial hemorrhage, mass or mass effect. The ventricles demonstrate mild expected ex vacuo prominence. The orbits are normal. The paranasal sinuses appear clear. Intracranial arterial flow voids are maintained. There is a tiny focus of cortical enhancement seen along the left frontal lobe on image 142 of series 13 measuring 1 to 2 mm, favored vascular in etiology without evidence of associated edema or mass effect.     Impression: Impression: No convincing evidence of intracranial metastasis. A 1 to 2 mm area of abnormal appearing enhancement  is seen along a cortical margin in the left frontal lobe as above. While tiny metastasis is not excluded, this appears to represent a small vascular finding. Consider attention on follow-up. Electronically Signed: Alexandre Matos MD  9/25/2024 1:06 PM EDT  Workstation ID: [id number]     Procedures    Assessment / Plan      Assessment/Plan:     Nonsmall cell lung cancer of the RLL, Stage IIIA  -He has an enlarging RLL nodule with SUV of 17. Despite negative transbronchial biopsy, he was found to have N2 disease with a positive level 7 LN. We discussed options for definitive treatment to include induction chemo immunotherapy followed by surgical resection, chemoradiation followed by surgical resection, or definitive chemoradiation. We discussed differences in schedules and side effects. He has no contraindications to immunotherapy and I recommended nivolumab + chemotherapy induction.  His case was reviewed at multidisciplinary tumor board including thoracic surgery.  He was felt to be a good candidate for neoadjuvant chemo immunotherapy followed by resection assessment.  He completed 3 cycles of  nivolumab, carboplatin, paclitaxel x3 cycles in a neoadjuvant fashion.  He underwent right lower lobectomy and lymph node dissection.  -Final pathology reviewed and shows 1.4 cm of residual disease with extensive treatment effect.  The previously biopsied lymph node was negative on mediastinal dissection. Because his original bronchoscopy specimen was QNS for Tempus tissue testing, I ordered repeat Tempus on the resection specimen to rule out any EGFR or ALK mutation.  There was no mutation on liquid biopsy.  -His case was reviewed at multidisciplinary tumor board and consensus was to proceed with adjuvant immunotherapy without any indication for further chemotherapy or adjuvant radiation.  We proceeded with atezolizumab per the [id number] regimen as per NCCN guidelines. He has received 2 cycles of adjuvant immunotherapy with  atezolizumab.  He reports substantial treatment related side effects.  He has not experienced any severe immunotherapy related adverse events that would necessitate cessation of all immunotherapy drugs.  However he has had intolerable side effects on atezolizumab including severe nausea and fatigue that last for a week and impair his quality of life, such that he does not feel he can continue with the atezolizumab treatments.  Because he had an excellent response to chemoimmunotherapy with the use of nivolumab, because he cannot continue the atezolizumab due to side effects, and because he did not have any severe immune related adverse events that would be considered a contraindication to further immunotherapy, I recommended that we proceed with adjuvant nivolumab.  He then had recurrent admissions with infections, but also recurrent hypotension, ARACELI. He is doing better. Will omit further immunotherapy  -PET CT from 9/2024 personally reviewed and MOODY.   -MRI with no metastatic disease  -CBC/CMP reviewed and in acceptable range    Orders Placed This Encounter   Procedures    NM PET/CT Skull Base to Mid Thigh    Comprehensive Metabolic Panel    CBC & Differential         4. 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 Nephrology following.     Follow-up   3 mo with scans and labs  Port flushes until removal.     Neetu Ashley MD  Hematology and Oncology

## 2024-10-29 ENCOUNTER — HOSPITAL ENCOUNTER (OUTPATIENT)
Dept: ONCOLOGY | Facility: HOSPITAL | Age: 74
Discharge: HOME | End: 2024-10-29
Admitting: INTERNAL MEDICINE
Payer: MEDICARE

## 2024-10-29 ENCOUNTER — TELEPHONE (OUTPATIENT)
Dept: ONCOLOGY | Facility: CLINIC | Age: 74
End: 2024-10-29
Payer: MEDICARE

## 2024-10-29 ENCOUNTER — OFFICE VISIT (OUTPATIENT)
Dept: ONCOLOGY | Facility: CLINIC | Age: 74
End: 2024-10-29
Payer: MEDICARE

## 2024-10-29 VITALS
SYSTOLIC BLOOD PRESSURE: 184 MMHG | TEMPERATURE: 98.4 F | WEIGHT: 180 LBS | RESPIRATION RATE: 28 BRPM | HEART RATE: 78 BPM | HEIGHT: 72 IN | OXYGEN SATURATION: 99 % | DIASTOLIC BLOOD PRESSURE: 95 MMHG | BODY MASS INDEX: 24.38 KG/M2

## 2024-10-29 DIAGNOSIS — C34.31 MALIGNANT NEOPLASM OF LOWER LOBE OF RIGHT LUNG: Primary | ICD-10-CM

## 2024-10-29 DIAGNOSIS — N63.14 MASS OF LOWER INNER QUADRANT OF RIGHT BREAST: ICD-10-CM

## 2024-10-29 DIAGNOSIS — N63.10 MASS OF RIGHT BREAST, UNSPECIFIED QUADRANT: Primary | ICD-10-CM

## 2024-10-29 LAB
ALBUMIN SERPL-MCNC: 3.7 G/DL (ref 3.5–5.2)
ALBUMIN/GLOB SERPL: 1.1 G/DL
ALP SERPL-CCNC: 65 U/L (ref 39–117)
ALT SERPL W P-5'-P-CCNC: 10 U/L (ref 1–41)
ANION GAP SERPL CALCULATED.3IONS-SCNC: 10 MMOL/L (ref 5–15)
AST SERPL-CCNC: 14 U/L (ref 1–40)
BASOPHILS # BLD AUTO: 0.02 10*3/MM3 (ref 0–0.2)
BASOPHILS NFR BLD AUTO: 0.2 % (ref 0–1.5)
BILIRUB SERPL-MCNC: 0.2 MG/DL (ref 0–1.2)
BUN SERPL-MCNC: 13 MG/DL (ref 8–23)
BUN/CREAT SERPL: 8.3 (ref 7–25)
CALCIUM SPEC-SCNC: 9.1 MG/DL (ref 8.6–10.5)
CHLORIDE SERPL-SCNC: 109 MMOL/L (ref 98–107)
CO2 SERPL-SCNC: 22 MMOL/L (ref 22–29)
CREAT SERPL-MCNC: 1.57 MG/DL (ref 0.76–1.27)
DEPRECATED RDW RBC AUTO: 48.1 FL (ref 37–54)
EGFRCR SERPLBLD CKD-EPI 2021: 45.7 ML/MIN/1.73
EOSINOPHIL # BLD AUTO: 0.04 10*3/MM3 (ref 0–0.4)
EOSINOPHIL NFR BLD AUTO: 0.4 % (ref 0.3–6.2)
ERYTHROCYTE [DISTWIDTH] IN BLOOD BY AUTOMATED COUNT: 13.5 % (ref 12.3–15.4)
GLOBULIN UR ELPH-MCNC: 3.5 GM/DL
GLUCOSE SERPL-MCNC: 96 MG/DL (ref 65–99)
HCT VFR BLD AUTO: 33.7 % (ref 37.5–51)
HGB BLD-MCNC: 11.2 G/DL (ref 13–17.7)
IMM GRANULOCYTES # BLD AUTO: 0.01 10*3/MM3 (ref 0–0.05)
IMM GRANULOCYTES NFR BLD AUTO: 0.1 % (ref 0–0.5)
LYMPHOCYTES # BLD AUTO: 2.48 10*3/MM3 (ref 0.7–3.1)
LYMPHOCYTES NFR BLD AUTO: 25.9 % (ref 19.6–45.3)
MCH RBC QN AUTO: 32.4 PG (ref 26.6–33)
MCHC RBC AUTO-ENTMCNC: 33.2 G/DL (ref 31.5–35.7)
MCV RBC AUTO: 97.4 FL (ref 79–97)
MONOCYTES # BLD AUTO: 0.83 10*3/MM3 (ref 0.1–0.9)
MONOCYTES NFR BLD AUTO: 8.7 % (ref 5–12)
NEUTROPHILS NFR BLD AUTO: 6.18 10*3/MM3 (ref 1.7–7)
NEUTROPHILS NFR BLD AUTO: 64.7 % (ref 42.7–76)
PLATELET # BLD AUTO: 213 10*3/MM3 (ref 140–450)
PMV BLD AUTO: 8.3 FL (ref 6–12)
POTASSIUM SERPL-SCNC: 3.7 MMOL/L (ref 3.5–5.2)
PROT SERPL-MCNC: 7.2 G/DL (ref 6–8.5)
RBC # BLD AUTO: 3.46 10*6/MM3 (ref 4.14–5.8)
SODIUM SERPL-SCNC: 141 MMOL/L (ref 136–145)
WBC NRBC COR # BLD AUTO: 9.56 10*3/MM3 (ref 3.4–10.8)

## 2024-10-29 PROCEDURE — 36591 DRAW BLOOD OFF VENOUS DEVICE: CPT

## 2024-10-29 PROCEDURE — 99214 OFFICE O/P EST MOD 30 MIN: CPT | Performed by: INTERNAL MEDICINE

## 2024-10-29 PROCEDURE — 80053 COMPREHEN METABOLIC PANEL: CPT | Performed by: INTERNAL MEDICINE

## 2024-10-29 PROCEDURE — 85025 COMPLETE CBC W/AUTO DIFF WBC: CPT | Performed by: INTERNAL MEDICINE

## 2024-10-29 PROCEDURE — 25010000002 HEPARIN LOCK FLUSH PER 10 UNITS: Performed by: INTERNAL MEDICINE

## 2024-10-29 PROCEDURE — 3077F SYST BP >= 140 MM HG: CPT | Performed by: INTERNAL MEDICINE

## 2024-10-29 PROCEDURE — 3080F DIAST BP >= 90 MM HG: CPT | Performed by: INTERNAL MEDICINE

## 2024-10-29 PROCEDURE — 1126F AMNT PAIN NOTED NONE PRSNT: CPT | Performed by: INTERNAL MEDICINE

## 2024-10-29 RX ORDER — ONDANSETRON 4 MG/1
TABLET, FILM COATED ORAL
COMMUNITY
Start: 2024-10-24

## 2024-10-29 RX ORDER — HEPARIN SODIUM (PORCINE) LOCK FLUSH IV SOLN 100 UNIT/ML 100 UNIT/ML
500 SOLUTION INTRAVENOUS AS NEEDED
Status: DISCONTINUED | OUTPATIENT
Start: 2024-10-29 | End: 2024-10-30 | Stop reason: HOSPADM

## 2024-10-29 RX ADMIN — HEPARIN 500 UNITS: 100 SYRINGE at 14:17

## 2024-10-29 NOTE — TELEPHONE ENCOUNTER
Patient's friend left a voicemail patient has woken up this morning with his right breast being swollen and hard. Wants to know if he can be seen tomorrow, and get a CT scan? Please call.

## 2024-10-29 NOTE — PROGRESS NOTES
Hematology and Oncology [place]  Office number [phone]    Fax number [phone]     Follow up     Date: 10/29/24    Patient Name: David Barfield  MRN: [id number]  : 1950    Referring Physician: Dr. Sampson    Chief Complaint: Nonsmall cell lung cancer follow-up on treatment    Cancer Staging:  Cancer Staging   Stage IIIA (cT2b, cN2, cM0)    History of Present Illness: David Barfield is a pleasant 74 y.o. male who presents today for evaluation of NSCLC. He has a 50 pack year smoking history.    He has a history of a PET avid subpleural RLL lung nodule [facility] [place] [facility]. This had an SUV of 9.8 on PET  in association with increased mediastinal LN uptake with SUV around 3. Imaging was felt secondary to osteophyte fibrosis. He did undergo bronchoscopy 2020 with negative cytology. The RLL lung nodule was not felt amenable to bronchoscopy biopsy.    CT lung screen 2023 showed:      PET CT showed mass in the RLL intensely SUV avid, max 17. Mediastinal LN not hypermetabolic above baseline.     Right lower lobe robotic transbronchial biopsy and cytology on 9/15/2023 showed benign findings. Cultures including AFT and fungal were negative.  Station 11L, Station 4L LN negative  Station 7 LN showed NSCLCa (positive for cytokeratin 7, p63, and TTF-1 with no significant staining for p40 or Napsin. The staining pattern raises the possibility of mixed adenocarcinoma and squamous cell carcinoma differentiation in this tumor). PDL1 negative.    Tempus negative for targetable mutations on liquid biopsy. QNS on tissue at diagnosis.    MRI brain was negative.    He has a history of sick sinus syndrome s/p PPM and moderate COPD. He describes only mild physical limitations from this. For example he recently walked [facility].5 miles at [facility]. At home, he climbs 17 steps without issue. He does sit down with long activities.     Following neoadjuvant chemoimmunotherapy  he underwent lobectomy and mediastinal lymph node dissection on January 9, 2024.  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  Margins were negative.  Regional lymph nodes including 4R, 12 R, and 7 were negative as were 5 intralobar lymph nodes.    Treatment history:  Carbo, taxol, nivo, C1 10/24/23; C2 11/15/23; C3 11/21/23  Atezolizumab maintenance, cycle 1 2/6/2024; cycle 2 2/27/24: stopped for intolerance (nausea, malaise, poor QOL) but no severe immune events  Opdivo maintenance, cycle 1: 4/4/24: terminated for ARACELI    Interval history:   Mr. Barfield is here in the company of his significant other for an acute visit.  He noted some chills overnight but did not check his temperature.  He has had some pain in his right lateral chest wall which is in the location of his prior thoracotomy.  Intermittently this will worsen especially after activities like carrying water bottles.  They noted that this morning his breast was swollen, hard, and erythematous.  It is no longer hard or erythematous, but his significant other feels 2 areas of abnormality in the lower inner quadrant that she is concerned about.     Past Medical History:   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    Coronary artery disease 2019    Diabetes mellitus Borderline    Emphysema/COPD     Erectile disorder     GERD (gastroesophageal reflux disease)     History of chemotherapy     Hyperlipidemia     Hypertension 2019    Lung nodule     Mumps     Mumps     Pruritus     after bath    Slow to wake up after anesthesia     Stage 5 chronic kidney disease not on chronic dialysis 5/14/2024    Wears dentures     upper only    Wears hearing aid in both ears     usually only wears right   No personal history of myocardial  infarction, cerebrovascular event, or venous thromboembolism.  No autoimmune diseases.   No prior cscope, mailed cologuard recently    Past Surgical History:   Past Surgical History:   Procedure Laterality Date    BONE BIOPSY      broken bone surgery in his face    BRONCHOSCOPY THORACOTOMY Right 01/09/2024    Procedure: THORACOTOMY FOR LOWER LOBECTOMY AND MEDISTINAL LYMPH NODE DISSECTION RIGHT;  Surgeon: Joey Patel MD;  Location:  [facility];  Service: Cardiothoracic;  Laterality: Right;    BRONCHOSCOPY WITH ION ROBOTIC ASSIST N/A 09/15/2023    Procedure: BRONCHOSCOPY NAVIGATION WITH ENDOBRONCHIAL ULTRASOUND AND ION ROBOT;  Surgeon: Octaviano Sampson MD;  Location:  [facility];  Service: Robotics - Pulmonary;  Laterality: N/A;  ion #6 - 0032  - 0015  Cath guide 0061    EBUS balloon removed and intact    CARDIAC ELECTROPHYSIOLOGY PROCEDURE N/A 08/17/2021    Procedure: Pacemaker DC new;  Surgeon: Kayy Box MD;  Location:  [facility];  Service: Cardiology;  Laterality: N/A;    FACIAL FRACTURE SURGERY      LYMPH NODE BIOPSY  2023    PACEMAKER IMPLANTATION       Family History:   Family History   Problem Relation Age of Onset    Aneurysm Mother         brain    Dementia Father     Leukemia Sister     Heart disease Paternal Grandmother     Hypertension Paternal Grandfather     Cancer Sister    Sister leukemia at age 21  No other malignancy    Social History:   Social History     Socioeconomic History    Marital status: Single    Number of children: 3   Tobacco Use    Smoking status: Every Day     Current packs/day: 0.50     Average packs/day: 0.5 packs/day for 56.8 years (28.9 ttl pk-yrs)     Types: Cigarettes     Start date: 1/1/1968    Smokeless tobacco: Never    Tobacco comments:     Still smoke   Vaping Use    Vaping status: Never Used   Substance and Sexual Activity    Alcohol use: Never    Drug use: Never    Sexual activity: Yes     Partners: Female     Birth  control/protection: None   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 OTC, Disp: , Rfl:     ferrous sulfate 325 (65 FE) MG tablet, Take 1 tablet by mouth Daily With Breakfast. OTC, Disp: , Rfl:     fludrocortisone 0.1 MG tablet, Take 1 tablet by mouth Daily., Disp: 90 tablet, Rfl: 1    fluticasone (VERAMYST) 27.5 MCG/SPRAY nasal spray, 2 sprays into the nostril(s) as directed by provider 1 (One) Time As Needed for Rhinitis or Allergies., Disp: 6 mL, Rfl: 2    Fluticasone-Umeclidin-Vilant (Trelegy Ellipta) 100-62.5-25 MCG/ACT inhaler, Inhale 1 puff Daily., Disp: 3 each, Rfl: 3    HYDROcodone-acetaminophen (Norco) 7.5-325 MG per tablet, Take 1 tablet by mouth Every 6 (Six) Hours As Needed for Moderate Pain. (Patient not taking: Reported on 7/10/2024), Disp: 60 tablet, Rfl: 0    lidocaine-prilocaine (EMLA) 2.5-2.5 % cream, Apply 1 application  topically to the appropriate area as directed As Needed (45-60 minutes prior to port access.  Cover with saran/plastic wrap.)., Disp: 30 g, Rfl: 3    magnesium chloride ER 64 MG DR tablet, Take 1 tablet by mouth Daily., Disp: , Rfl:     midodrine (PROAMATINE) 10 MG tablet, Take 1 tablet by mouth 3 (Three) Times a Day As Needed (Systolic BP less then 100)., Disp: 30 tablet, Rfl: 0    omeprazole (priLOSEC) 40 MG capsule, Take 1 capsule by mouth Daily., Disp: 30 capsule, Rfl: 5    ondansetron (ZOFRAN) 8 MG tablet, Take 1 tablet by mouth 3 (Three) Times a Day As Needed for Nausea or Vomiting., Disp: 30 tablet, Rfl: 2    pravastatin (PRAVACHOL) 80 MG tablet, Take 1 tablet by mouth Every Night.,  "Disp: 90 tablet, Rfl: 1    sodium bicarbonate 650 MG tablet, Take 2 tablets by mouth 3 times a day., Disp: , Rfl:     Allergies:   Allergies   Allergen Reactions    Cymbalta [Duloxetine Hcl] GI Intolerance    Gabapentin Mental Status Change     Pt states that this medication \"makes him feel foolish in his head\".     Remeron [Mirtazapine] Other (See Comments)     Excess sedation    Toradol [Ketorolac Tromethamine] GI Intolerance     Projectile vomiting     Latex Other (See Comments)     Latex allergy     Tape Rash       Objective     Vital Signs:   There were no vitals filed for this visit.   There is no height or weight on file to calculate BMI.   There were no vitals filed for this visit.      ECOG Performance Status: 1 - Symptomatic but completely ambulatory    Physical Exam:   General: No acute distress.   HEENT: Normocephalic, atraumatic. Sclera anicteric.   Neck: supple, no adenopathy.   Cardiovascular: RRR no murmurs  Respiratory: Clear to auscultation bilaterally.  Abdomen: Soft, nontender, non distended  Lymph: no cervical, supraclavicular or axillary adenopathy  Neuro: Alert and oriented x 3. No focal deficits.   Ext: Symmetric, no swelling.   Right breast: he has a pacemaker he notes tenderness and indicates along his lateral chest wall.  His significant other notes 2 areas of concern in the right lower inner quadrant which she describes as small masses.  I am not able to discretely palpate a mass in that area but he does have underlying rib.  His skin is normal in coloration and temperature.    Laboratory/Imaging Reviewed:   No visits with results within 2 Week(s) from this visit.   Latest known visit with results is:   Lab on 10/07/2024   Component Date Value Ref Range Status    Iron 10/07/2024 106  59 - 158 mcg/dL Final    Iron Saturation (TSAT) 10/07/2024 32  20 - 50 % Final    Transferrin 10/07/2024 220  200 - 360 mg/dL Final    TIBC 10/07/2024 328  298 - 536 mcg/dL Final    Glucose 10/07/2024 73  65 " - 99 mg/dL Final    BUN 10/07/2024 15  8 - 23 mg/dL Final    Creatinine 10/07/2024 1.57 (H)  0.76 - 1.27 mg/dL Final    Sodium 10/07/2024 141  136 - 145 mmol/L Final    Potassium 10/07/2024 3.9  3.5 - 5.2 mmol/L Final    Chloride 10/07/2024 107  98 - 107 mmol/L Final    CO2 10/07/2024 24.0  22.0 - 29.0 mmol/L Final    Calcium 10/07/2024 9.8  8.6 - 10.5 mg/dL Final    Albumin 10/07/2024 4.0  3.5 - 5.2 g/dL Final    Phosphorus 10/07/2024 3.7  2.5 - 4.5 mg/dL Final    Anion Gap 10/07/2024 10.0  5.0 - 15.0 mmol/L Final    BUN/Creatinine Ratio 10/07/2024 9.6  7.0 - 25.0 Final    eGFR 10/07/2024 45.7 (L)  >60.0 mL/min/1.73 Final    Total Protein, Urine 10/07/2024 11.3  mg/dL Final    Creatinine, Urine 10/07/2024 91.9  mg/dL Final    Ferritin 10/07/2024 133.00  30.00 - 400.00 ng/mL Final    WBC 10/07/2024 9.91  3.40 - 10.80 10*3/mm3 Final    RBC 10/07/2024 3.88 (L)  4.14 - 5.80 10*6/mm3 Final    Hemoglobin 10/07/2024 12.3 (L)  13.0 - 17.7 g/dL Final    Hematocrit 10/07/2024 37.7  37.5 - 51.0 % Final    MCV 10/07/2024 97.2 (H)  79.0 - 97.0 fL Final    MCH 10/07/2024 31.7  26.6 - 33.0 pg Final    MCHC 10/07/2024 32.6  31.5 - 35.7 g/dL Final    RDW 10/07/2024 12.7  12.3 - 15.4 % Final    RDW-SD 10/07/2024 44.3  37.0 - 54.0 fl Final    MPV 10/07/2024 8.9  6.0 - 12.0 fL Final    Platelets 10/07/2024 282  140 - 450 10*3/mm3 Final    Neutrophil % 10/07/2024 64.9  42.7 - 76.0 % Final    Lymphocyte % 10/07/2024 22.9  19.6 - 45.3 % Final    Monocyte % 10/07/2024 11.2  5.0 - 12.0 % Final    Eosinophil % 10/07/2024 0.4  0.3 - 6.2 % Final    Basophil % 10/07/2024 0.3  0.0 - 1.5 % Final    Immature Grans % 10/07/2024 0.3  0.0 - 0.5 % Final    Neutrophils, Absolute 10/07/2024 6.43  1.70 - 7.00 10*3/mm3 Final    Lymphocytes, Absolute 10/07/2024 2.27  0.70 - 3.10 10*3/mm3 Final    Monocytes, Absolute 10/07/2024 1.11 (H)  0.10 - 0.90 10*3/mm3 Final    Eosinophils, Absolute 10/07/2024 0.04  0.00 - 0.40 10*3/mm3 Final    Basophils, Absolute  10/07/2024 0.03  0.00 - 0.20 10*3/mm3 Final    Immature Grans, Absolute 10/07/2024 0.03  0.00 - 0.05 10*3/mm3 Final    nRBC 10/07/2024 0.0  0.0 - 0.2 /100 WBC Final       No results found.    Procedures    Assessment / Plan      Assessment/Plan:     Nonsmall cell lung cancer of the RLL, Stage IIIA  -He has an enlarging RLL nodule with SUV of 17. Despite negative transbronchial biopsy, he was found to have N2 disease with a positive level 7 LN. We discussed options for definitive treatment to include induction chemo immunotherapy followed by surgical resection, chemoradiation followed by surgical resection, or definitive chemoradiation. We discussed differences in schedules and side effects. He has no contraindications to immunotherapy and I recommended nivolumab + chemotherapy induction.  His case was reviewed at multidisciplinary tumor board including thoracic surgery.  He was felt to be a good candidate for neoadjuvant chemo immunotherapy followed by resection assessment.  He completed 3 cycles of  nivolumab, carboplatin, paclitaxel x3 cycles in a neoadjuvant fashion.  He underwent right lower lobectomy and lymph node dissection.  -Final pathology reviewed and shows 1.4 cm of residual disease with extensive treatment effect.  The previously biopsied lymph node was negative on mediastinal dissection. Because his original bronchoscopy specimen was QNS for Tempus tissue testing, I ordered repeat Tempus on the resection specimen to rule out any EGFR or ALK mutation.  There was no mutation on liquid biopsy.  -His case was reviewed at multidisciplinary tumor board and consensus was to proceed with adjuvant immunotherapy without any indication for further chemotherapy or adjuvant radiation.  We proceeded with atezolizumab per the [id number] regimen as per NCCN guidelines. He has received 2 cycles of adjuvant immunotherapy with atezolizumab.  He reports substantial treatment related side effects.  He has not experienced  any severe immunotherapy related adverse events that would necessitate cessation of all immunotherapy drugs.  However he has had intolerable side effects on atezolizumab including severe nausea and fatigue that last for a week and impair his quality of life, such that he does not feel he can continue with the atezolizumab treatments.  Because he had an excellent response to chemoimmunotherapy with the use of nivolumab, because he cannot continue the atezolizumab due to side effects, and because he did not have any severe immune related adverse events that would be considered a contraindication to further immunotherapy, I recommended that we proceed with adjuvant nivolumab.  He then had recurrent admissions with infections, but also recurrent hypotension, ARACELI. He is doing better. Will omit further immunotherapy  -PET CT from 9/2024 personally reviewed and MOODY.   -MRI with no metastatic disease  -Due for port labs which we will obtain today.    4. CKD  -Following with nephrology  -CMP pending.    5. ?Mineralocorticoid def  -Responding clinically to fludrocortisone. Nephrology following.     6.  Right chest wall pain  -Suspect neuropathic secondary to prior surgery.  He certainly could have an element of intermittent lymphedema.  He declines any medications.    7.  Self-palpated right breast mass  -Exam is reassuring.  Will obtain diagnostic mammogram and ultrasound in area of concern.    Follow-up   With next scans     Neetu Ashley MD  Hematology and Oncology

## 2024-10-29 NOTE — TELEPHONE ENCOUNTER
Spoke with Balaji and she stated patient is weak, has had chills and was sweating during the night last night.  She stated the area is hot but not red and she stated patient says the area is painful.  She stated patient denies SOA at rest and with activity but he is weak.  She stated he does not currently have a fever.  Dr. Ashley notified and patient advised to come to clinic now for appointment with Dr. Ashley for evaluation.  She verbalized understanding and agreed. She stated she will bring patient to clinic now for appointment.  Scheduling notified to schedule patient per MD.

## 2024-11-05 ENCOUNTER — OFFICE VISIT (OUTPATIENT)
Dept: INTERNAL MEDICINE | Facility: CLINIC | Age: 74
End: 2024-11-05
Payer: MEDICARE

## 2024-11-05 ENCOUNTER — LAB (OUTPATIENT)
Facility: HOSPITAL | Age: 74
End: 2024-11-05
Payer: MEDICARE

## 2024-11-05 ENCOUNTER — HOSPITAL ENCOUNTER (OUTPATIENT)
Facility: HOSPITAL | Age: 74
Discharge: HOME | End: 2024-11-05
Payer: MEDICARE

## 2024-11-05 ENCOUNTER — TELEPHONE (OUTPATIENT)
Dept: ONCOLOGY | Facility: CLINIC | Age: 74
End: 2024-11-05
Payer: MEDICARE

## 2024-11-05 VITALS
HEART RATE: 76 BPM | DIASTOLIC BLOOD PRESSURE: 80 MMHG | SYSTOLIC BLOOD PRESSURE: 160 MMHG | TEMPERATURE: 97.8 F | HEIGHT: 72 IN | BODY MASS INDEX: 24.6 KG/M2 | RESPIRATION RATE: 18 BRPM | OXYGEN SATURATION: 99 % | WEIGHT: 181.6 LBS

## 2024-11-05 DIAGNOSIS — I95.9 HYPOTENSION, UNSPECIFIED HYPOTENSION TYPE: ICD-10-CM

## 2024-11-05 DIAGNOSIS — I95.9 HYPOTENSION, UNSPECIFIED HYPOTENSION TYPE: Primary | ICD-10-CM

## 2024-11-05 DIAGNOSIS — N63.14 MASS OF LOWER INNER QUADRANT OF RIGHT BREAST: ICD-10-CM

## 2024-11-05 DIAGNOSIS — N18.32 STAGE 3B CHRONIC KIDNEY DISEASE: ICD-10-CM

## 2024-11-05 LAB
DEPRECATED RDW RBC AUTO: 43.8 FL (ref 37–54)
ERYTHROCYTE [DISTWIDTH] IN BLOOD BY AUTOMATED COUNT: 12.8 % (ref 12.3–15.4)
HCT VFR BLD AUTO: 36.9 % (ref 37.5–51)
HGB BLD-MCNC: 12.4 G/DL (ref 13–17.7)
MCH RBC QN AUTO: 31.9 PG (ref 26.6–33)
MCHC RBC AUTO-ENTMCNC: 33.6 G/DL (ref 31.5–35.7)
MCV RBC AUTO: 94.9 FL (ref 79–97)
PLATELET # BLD AUTO: 278 10*3/MM3 (ref 140–450)
PMV BLD AUTO: 9.1 FL (ref 6–12)
RBC # BLD AUTO: 3.89 10*6/MM3 (ref 4.14–5.8)
WBC NRBC COR # BLD AUTO: 9.97 10*3/MM3 (ref 3.4–10.8)

## 2024-11-05 PROCEDURE — 3079F DIAST BP 80-89 MM HG: CPT | Performed by: NURSE PRACTITIONER

## 2024-11-05 PROCEDURE — 85027 COMPLETE CBC AUTOMATED: CPT

## 2024-11-05 PROCEDURE — 1160F RVW MEDS BY RX/DR IN RCRD: CPT | Performed by: NURSE PRACTITIONER

## 2024-11-05 PROCEDURE — 3044F HG A1C LEVEL LT 7.0%: CPT | Performed by: NURSE PRACTITIONER

## 2024-11-05 PROCEDURE — 3077F SYST BP >= 140 MM HG: CPT | Performed by: NURSE PRACTITIONER

## 2024-11-05 PROCEDURE — 80053 COMPREHEN METABOLIC PANEL: CPT

## 2024-11-05 PROCEDURE — 99213 OFFICE O/P EST LOW 20 MIN: CPT | Performed by: NURSE PRACTITIONER

## 2024-11-05 PROCEDURE — 1125F AMNT PAIN NOTED PAIN PRSNT: CPT | Performed by: NURSE PRACTITIONER

## 2024-11-05 PROCEDURE — 1159F MED LIST DOCD IN RCRD: CPT | Performed by: NURSE PRACTITIONER

## 2024-11-05 NOTE — TELEPHONE ENCOUNTER
Patient's friend called said patient went to get Mammogram today, and they wouldn't do it. He told them he was very sore there. They didn't do the ultrasound either. Please call.

## 2024-11-05 NOTE — TELEPHONE ENCOUNTER
Please call breast imaging manager to clarify why this was cancelled and what needs to be re-ordered/re-scheduled

## 2024-11-05 NOTE — PROGRESS NOTES
Office Note     Name: David Barfield    : 1950     MRN: [id number]     Chief Complaint  Hypotension (70/50 last night /)    Subjective     History of Present Illness:  David Barfield is a 74 y.o. male who presents today for blood pressure concerns.     Patient's significant other was with him today.     Patient significant other stated that he did have a very busy weekend.  Blood pressure last night was low 70s over 50s.  She states she did give him medication to increase his blood pressure but now she feels like it has caused a rebound effect.  Blood pressure is 160s in office today.  Patient denies any symptoms such as chest pain, shortness of breath, blurry vision, headaches.  He does continue to take the fludrocortisone, half in the morning and half at night.  Patient did not particularly mention any weakness but the significant other did.  Significant other is also requesting labs today.  Patient declined going to the ER last night      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    Coronary artery disease 2019    Diabetes mellitus Borderline    Emphysema/COPD     Erectile disorder     GERD (gastroesophageal reflux disease)     History of chemotherapy     Hyperlipidemia     Hypertension 2019    Lung nodule     Mumps     Mumps     Pruritus     after bath    Slow to wake up after anesthesia     Stage 5 chronic kidney disease not on chronic dialysis 2024    Wears dentures     upper only    Wears hearing aid in both ears     usually only wears right       Past Surgical History:   Procedure Laterality Date    BONE BIOPSY      broken bone surgery in his face    BRONCHOSCOPY THORACOTOMY Right 2024    Procedure: THORACOTOMY FOR LOWER LOBECTOMY AND MEDISTINAL LYMPH NODE DISSECTION RIGHT;  Surgeon: Joey Patel MD;  Location: [facility];  Service: Cardiothoracic;   Laterality: Right;    BRONCHOSCOPY WITH ION ROBOTIC ASSIST N/A 09/15/2023    Procedure: BRONCHOSCOPY NAVIGATION WITH ENDOBRONCHIAL ULTRASOUND AND ION ROBOT;  Surgeon: Octaviano Sampson MD;  Location:  [facility];  Service: Robotics - Pulmonary;  Laterality: N/A;  ion #6 - 0032  - 0015  Cath guide 0061    EBUS balloon removed and intact    CARDIAC ELECTROPHYSIOLOGY PROCEDURE N/A 08/17/2021    Procedure: Pacemaker DC new;  Surgeon: Kayy Box MD;  Location:  [facility];  Service: Cardiology;  Laterality: N/A;    FACIAL FRACTURE SURGERY      LYMPH NODE BIOPSY  2023    PACEMAKER IMPLANTATION         Social History     Socioeconomic History    Marital status: Single    Number of children: 3   Tobacco Use    Smoking status: Every Day     Current packs/day: 0.50     Average packs/day: 0.5 packs/day for 56.8 years (28.9 ttl pk-yrs)     Types: Cigarettes     Start date: 1/1/1968    Smokeless tobacco: Never    Tobacco comments:     Still smoke   Vaping Use    Vaping status: Never Used   Substance and Sexual Activity    Alcohol use: Never    Drug use: Never    Sexual activity: Yes     Partners: Female     Birth control/protection: None         Current Outpatient Medications:     albuterol sulfate  (90 Base) MCG/ACT inhaler, Inhale 2 puffs Every 4 (Four) Hours As Needed for Wheezing., Disp: 18 g, Rfl: 11    B Complex Vitamins (vitamin b complex) capsule capsule, Take 1 capsule by mouth Daily. OTC, Disp: , Rfl:     ferrous sulfate 325 (65 FE) MG tablet, Take 1 tablet by mouth Daily With Breakfast. OTC, Disp: , Rfl:     fludrocortisone 0.1 MG tablet, Take 1 tablet by mouth Daily., Disp: 90 tablet, Rfl: 1    fluticasone (VERAMYST) 27.5 MCG/SPRAY nasal spray, 2 sprays into the nostril(s) as directed by provider 1 (One) Time As Needed for Rhinitis or Allergies., Disp: 6 mL, Rfl: 2    Fluticasone-Umeclidin-Vilant (Trelegy Ellipta) 100-62.5-25 MCG/ACT inhaler, Inhale 1 puff Daily., Disp: 3  "each, Rfl: 3    HYDROcodone-acetaminophen (Norco) 7.5-325 MG per tablet, Take 1 tablet by mouth Every 6 (Six) Hours As Needed for Moderate Pain., Disp: 60 tablet, Rfl: 0    lidocaine-prilocaine (EMLA) 2.5-2.5 % cream, Apply 1 application  topically to the appropriate area as directed As Needed (45-60 minutes prior to port access.  Cover with saran/plastic wrap.)., Disp: 30 g, Rfl: 3    magnesium chloride ER 64 MG DR tablet, Take 1 tablet by mouth Daily., Disp: , Rfl:     midodrine (PROAMATINE) 10 MG tablet, Take 1 tablet by mouth 3 (Three) Times a Day As Needed (Systolic BP less then 100)., Disp: 30 tablet, Rfl: 0    omeprazole (priLOSEC) 40 MG capsule, Take 1 capsule by mouth Daily., Disp: 30 capsule, Rfl: 5    ondansetron (ZOFRAN) 4 MG tablet, , Disp: , Rfl:     ondansetron (ZOFRAN) 8 MG tablet, Take 1 tablet by mouth 3 (Three) Times a Day As Needed for Nausea or Vomiting., Disp: 30 tablet, Rfl: 2    pravastatin (PRAVACHOL) 80 MG tablet, Take 1 tablet by mouth Every Night., Disp: 90 tablet, Rfl: 1    sodium bicarbonate 650 MG tablet, Take 2 tablets by mouth 3 times a day., Disp: , Rfl:     Objective     Vital Signs  /80   Pulse 76   Temp 97.8 °F (36.6 °C) (Temporal)   Resp 18   Ht 182.9 cm (72.01\")   Wt 82.4 kg (181 lb 9.6 oz)   SpO2 99%   BMI 24.62 kg/m²   Estimated body mass index is 24.62 kg/m² as calculated from the following:    Height as of this encounter: 182.9 cm (72.01\").    Weight as of this encounter: 82.4 kg (181 lb 9.6 oz).    BMI is within normal parameters. No other follow-up for BMI required.       Physical Exam  Vitals and nursing note reviewed.   Constitutional:       Appearance: Normal appearance.   HENT:      Head: Normocephalic and atraumatic.   Eyes:      Extraocular Movements: Extraocular movements intact.      Pupils: Pupils are equal, round, and reactive to light.   Cardiovascular:      Rate and Rhythm: Normal rate and regular rhythm.      Heart sounds: Normal heart sounds. "   Pulmonary:      Effort: Pulmonary effort is normal.      Breath sounds: Normal breath sounds.   Musculoskeletal:         General: Normal range of motion.   Skin:     General: Skin is warm and dry.   Neurological:      Mental Status: He is alert and oriented to person, place, and time.      Comments: Gait and balance intact during exam   Psychiatric:         Mood and Affect: Mood normal.         Behavior: Behavior normal.               Assessment and Plan     Diagnoses and all orders for this visit:    1. Hypotension, unspecified hypotension type (Primary)  -     CBC (No Diff); Future  -     Comprehensive Metabolic Panel; Future    2. Stage 3b chronic kidney disease    Plan  I did encourage patient and significant other to continue to monitor blood pressures through the rest of today.  I would recommend holding off on the fludrocortisone dose tonight  Do not provide any midodrine  Continue to monitor blood pressures tomorrow as well  Per request of significant other, updated CBC and comprehensive metabolic panel was ordered  Go to ER if any condition worsens or severe  Plan follow-up as scheduled    Follow Up  Return for Next scheduled follow up.    ANAMIKA Appiah    Part of this note may be an electronic transcription/translation of spoken language to printed text using the Dragon Dictation System.

## 2024-11-06 ENCOUNTER — TELEPHONE (OUTPATIENT)
Dept: INTERNAL MEDICINE | Facility: CLINIC | Age: 74
End: 2024-11-06
Payer: MEDICARE

## 2024-11-06 LAB
ALBUMIN SERPL-MCNC: 3.7 G/DL (ref 3.5–5.2)
ALBUMIN/GLOB SERPL: 0.9 G/DL
ALP SERPL-CCNC: 75 U/L (ref 39–117)
ALT SERPL W P-5'-P-CCNC: 12 U/L (ref 1–41)
ANION GAP SERPL CALCULATED.3IONS-SCNC: 9.5 MMOL/L (ref 5–15)
AST SERPL-CCNC: 16 U/L (ref 1–40)
BILIRUB SERPL-MCNC: 0.2 MG/DL (ref 0–1.2)
BUN SERPL-MCNC: 14 MG/DL (ref 8–23)
BUN/CREAT SERPL: 7.6 (ref 7–25)
CALCIUM SPEC-SCNC: 10 MG/DL (ref 8.6–10.5)
CHLORIDE SERPL-SCNC: 105 MMOL/L (ref 98–107)
CO2 SERPL-SCNC: 22.5 MMOL/L (ref 22–29)
CREAT SERPL-MCNC: 1.85 MG/DL (ref 0.76–1.27)
EGFRCR SERPLBLD CKD-EPI 2021: 37.5 ML/MIN/1.73
GLOBULIN UR ELPH-MCNC: 4.2 GM/DL
GLUCOSE SERPL-MCNC: 93 MG/DL (ref 65–99)
POTASSIUM SERPL-SCNC: 4 MMOL/L (ref 3.5–5.2)
PROT SERPL-MCNC: 7.9 G/DL (ref 6–8.5)
SODIUM SERPL-SCNC: 137 MMOL/L (ref 136–145)

## 2024-11-06 NOTE — TELEPHONE ENCOUNTER
----- Message from Hayley Castellanos sent at 11/6/2024  8:16 AM EST -----  Please let patient know labs resulted.  His hemoglobin and hematocrit did improve from previous.  Potassium within normal limits.  His kidney function did worsen from previous labs about 8 days ago.  Continue to stay well-hydrated and avoid NSAIDs

## 2024-11-06 NOTE — TELEPHONE ENCOUNTER
Spoke with patient and Balaji on 11-5-24 at 15:46.  She stated that the [facility] imaging tech yesterday discussed the mammogram with patient and that patient stated he does not want to complete the mammogram and that the tech then refused to complete the ordered US.  This RN called and spoke with manager at [facility] and was advised that the US cannot be completed without performing the mammogram first.  Manager advised RN that if patient wishes to reschedule, they will work with him on the compression part of the mammogram and try and make him as comfortable as possible.  Dr. Ashley notified and per MD, patient's SO contacted back this day and advised that the US cannot be completed without doing the mammogram first and that per the manager in the [facility], they will work with him on the compression part of the mammogram and try and make him as comfortable as possible.  Riccardosherin stated patient heard RN as phone is on speaker phone and patient is refusing to reschedule mammogram and/or US.  RN advised that recommendation is unchanged and that he can call at any time to reschedule imaging if he changes his mind.  Balaji verbalized understanding.   - - -

## 2024-11-06 NOTE — TELEPHONE ENCOUNTER
Spoke with patient regarding results and recommendations  Patient inquired about any notes on the sodium levels

## 2024-11-19 ENCOUNTER — LAB (OUTPATIENT)
Dept: LAB | Facility: HOSPITAL | Age: 74
End: 2024-11-19
Payer: MEDICARE

## 2024-11-19 ENCOUNTER — OFFICE VISIT (OUTPATIENT)
Dept: INTERNAL MEDICINE | Facility: CLINIC | Age: 74
End: 2024-11-19
Payer: MEDICARE

## 2024-11-19 VITALS
SYSTOLIC BLOOD PRESSURE: 116 MMHG | OXYGEN SATURATION: 98 % | HEIGHT: 72 IN | HEART RATE: 85 BPM | BODY MASS INDEX: 24.92 KG/M2 | DIASTOLIC BLOOD PRESSURE: 68 MMHG | WEIGHT: 184 LBS

## 2024-11-19 DIAGNOSIS — E78.2 MIXED HYPERLIPIDEMIA: ICD-10-CM

## 2024-11-19 DIAGNOSIS — Z79.899 ON STATIN THERAPY: ICD-10-CM

## 2024-11-19 DIAGNOSIS — Z00.00 ENCOUNTER FOR WELL ADULT EXAM WITHOUT ABNORMAL FINDINGS: ICD-10-CM

## 2024-11-19 DIAGNOSIS — Z72.0 TOBACCO ABUSE: ICD-10-CM

## 2024-11-19 DIAGNOSIS — Z13.31 DEPRESSION SCREEN: ICD-10-CM

## 2024-11-19 DIAGNOSIS — Z00.00 ENCOUNTER FOR SUBSEQUENT ANNUAL WELLNESS VISIT (AWV) IN MEDICARE PATIENT: Primary | ICD-10-CM

## 2024-11-19 DIAGNOSIS — N18.4 STAGE 4 CHRONIC KIDNEY DISEASE: ICD-10-CM

## 2024-11-19 DIAGNOSIS — I95.89 CHRONIC HYPOTENSION: ICD-10-CM

## 2024-11-19 DIAGNOSIS — J43.2 CENTRILOBULAR EMPHYSEMA: ICD-10-CM

## 2024-11-19 DIAGNOSIS — C34.31 MALIGNANT NEOPLASM OF LOWER LOBE OF RIGHT LUNG: ICD-10-CM

## 2024-11-19 DIAGNOSIS — Z91.81 AT LOW RISK FOR FALL: ICD-10-CM

## 2024-11-19 DIAGNOSIS — Z53.20 COLON CANCER SCREENING DECLINED: ICD-10-CM

## 2024-11-19 DIAGNOSIS — I49.3 PVC'S (PREMATURE VENTRICULAR CONTRACTIONS): ICD-10-CM

## 2024-11-19 LAB
ALBUMIN SERPL-MCNC: 3.6 G/DL (ref 3.5–5.2)
ALBUMIN/GLOB SERPL: 1 G/DL
ALP SERPL-CCNC: 73 U/L (ref 39–117)
ALT SERPL W P-5'-P-CCNC: 14 U/L (ref 1–41)
ANION GAP SERPL CALCULATED.3IONS-SCNC: 12.3 MMOL/L (ref 5–15)
AST SERPL-CCNC: 21 U/L (ref 1–40)
BILIRUB SERPL-MCNC: 0.3 MG/DL (ref 0–1.2)
BUN SERPL-MCNC: 16 MG/DL (ref 8–23)
BUN/CREAT SERPL: 8.6 (ref 7–25)
CALCIUM SPEC-SCNC: 9.5 MG/DL (ref 8.6–10.5)
CHLORIDE SERPL-SCNC: 108 MMOL/L (ref 98–107)
CO2 SERPL-SCNC: 23.7 MMOL/L (ref 22–29)
CREAT SERPL-MCNC: 1.85 MG/DL (ref 0.76–1.27)
DEPRECATED RDW RBC AUTO: 45.3 FL (ref 37–54)
EGFRCR SERPLBLD CKD-EPI 2021: 37.5 ML/MIN/1.73
ERYTHROCYTE [DISTWIDTH] IN BLOOD BY AUTOMATED COUNT: 12.9 % (ref 12.3–15.4)
GLOBULIN UR ELPH-MCNC: 3.7 GM/DL
GLUCOSE SERPL-MCNC: 99 MG/DL (ref 65–99)
HCT VFR BLD AUTO: 37.3 % (ref 37.5–51)
HGB BLD-MCNC: 12.5 G/DL (ref 13–17.7)
MCH RBC QN AUTO: 32.3 PG (ref 26.6–33)
MCHC RBC AUTO-ENTMCNC: 33.5 G/DL (ref 31.5–35.7)
MCV RBC AUTO: 96.4 FL (ref 79–97)
PLATELET # BLD AUTO: 276 10*3/MM3 (ref 140–450)
PMV BLD AUTO: 9.3 FL (ref 6–12)
POTASSIUM SERPL-SCNC: 4.1 MMOL/L (ref 3.5–5.2)
PROT SERPL-MCNC: 7.3 G/DL (ref 6–8.5)
RBC # BLD AUTO: 3.87 10*6/MM3 (ref 4.14–5.8)
SODIUM SERPL-SCNC: 144 MMOL/L (ref 136–145)
WBC NRBC COR # BLD AUTO: 10.24 10*3/MM3 (ref 3.4–10.8)

## 2024-11-19 PROCEDURE — 85027 COMPLETE CBC AUTOMATED: CPT

## 2024-11-19 PROCEDURE — 80053 COMPREHEN METABOLIC PANEL: CPT

## 2024-11-19 NOTE — PROGRESS NOTES
Subjective   The ABCs of the Annual Wellness Visit  Medicare Wellness Visit      David Barfield is a 74 y.o. patient who presents for a Medicare Wellness Visit.    The following portions of the patient's history were reviewed and   updated as appropriate: allergies, current medications, past family history, past medical history, past social history, past surgical history, and problem list.    Compared to one year ago, the patient's physical   health is the same.  Compared to one year ago, the patient's mental   health is the same.    Recent Hospitalizations:  This patient has had a [facility] admission record on file within the last 365 days.  Current Medical Providers:  Patient Care Team:  Hayley Castellanos APRN as PCP - General (Nurse Practitioner)  Octaviano Sampson MD as Consulting Physician (Pulmonary Disease)  Neetu Ashley MD as Referring Physician (Hematology and Oncology)  Nimo Rodríguez MD as Consulting Physician (Radiation Oncology)    Outpatient Medications Prior to Visit   Medication Sig Dispense Refill    albuterol sulfate  (90 Base) MCG/ACT inhaler Inhale 2 puffs Every 4 (Four) Hours As Needed for Wheezing. 18 g 11    B Complex Vitamins (vitamin b complex) capsule capsule Take 1 capsule by mouth Daily. OTC      ferrous sulfate 325 (65 FE) MG tablet Take 1 tablet by mouth Daily With Breakfast. OTC      fludrocortisone 0.1 MG tablet Take 1 tablet by mouth Daily. 90 tablet 1    fluticasone (VERAMYST) 27.5 MCG/SPRAY nasal spray 2 sprays into the nostril(s) as directed by provider 1 (One) Time As Needed for Rhinitis or Allergies. 6 mL 2    Fluticasone-Umeclidin-Vilant (Trelegy Ellipta) 100-62.5-25 MCG/ACT inhaler Inhale 1 puff Daily. 3 each 3    HYDROcodone-acetaminophen (Norco) 7.5-325 MG per tablet Take 1 tablet by mouth Every 6 (Six) Hours As Needed for Moderate Pain. 60 tablet 0    lidocaine-prilocaine (EMLA) 2.5-2.5 % cream Apply 1 application  topically to the appropriate  area as directed As Needed (45-60 minutes prior to port access.  Cover with saran/plastic wrap.). 30 g 3    magnesium chloride ER 64 MG DR tablet Take 1 tablet by mouth Daily.      midodrine (PROAMATINE) 10 MG tablet Take 1 tablet by mouth 3 (Three) Times a Day As Needed (Systolic BP less then 100). 30 tablet 0    omeprazole (priLOSEC) 40 MG capsule Take 1 capsule by mouth Daily. 30 capsule 5    ondansetron (ZOFRAN) 4 MG tablet       ondansetron (ZOFRAN) 8 MG tablet Take 1 tablet by mouth 3 (Three) Times a Day As Needed for Nausea or Vomiting. 30 tablet 2    pravastatin (PRAVACHOL) 80 MG tablet Take 1 tablet by mouth Every Night. 90 tablet 1    sodium bicarbonate 650 MG tablet Take 2 tablets by mouth 3 times a day.       No facility-administered medications prior to visit.     Opioid medication/s are on active medication list.  and I have evaluated his active treatment plan and pain score trends (see table).  Vitals:    11/19/24 1120   PainSc:   4   PainLoc: Rib Cage     I have reviewed the chart for potential of high risk medication and harmful drug interactions in the elderly.        Aspirin is not on active medication list.   Not currently on medication list .    Patient Active Problem List   Diagnosis    High degree atrioventricular block    Bradycardia, sinus    Chronic hypotension    PVC's (premature ventricular contractions)    Presence of cardiac pacemaker    Mixed hyperlipidemia    Centrilobular emphysema    Nodule of lower lobe of right lung    Mediastinal adenopathy    Cough    Tobacco abuse    Bronchogenic cancer of right lung    Malignant neoplasm of lower lobe of right lung    Primary hypertension    GERD without esophagitis    Septic shock    Acute pyelonephritis    ARACELI (acute kidney injury)    Hypokalemia    Severe malnutrition    Leukocytosis    CAD (coronary artery disease)    Hypotension    Stage 4 chronic kidney disease    Moderate malnutrition    Weakness    Hypotension    Dehydration    C.  "difficile colitis    UTI (urinary tract infection)    Hx of hypotension     Advance Care Planning Advance Directive is on file.  ACP discussion was held with the patient during this visit. Patient has an advance directive in EMR which is still valid.  Advance directive is currently on file and currently active.            Objective   Vitals:    24 1120   BP: 116/68   BP Location: Left arm   Patient Position: Sitting   Cuff Size: Adult   Pulse: 85   SpO2: 98%   Weight: 83.5 kg (184 lb)   Height: 182.9 cm (72\")   PainSc:   4   PainLoc: Rib Cage       Estimated body mass index is 24.95 kg/m² as calculated from the following:    Height as of this encounter: 182.9 cm (72\").    Weight as of this encounter: 83.5 kg (184 lb).    BMI is within normal parameters. No other follow-up for BMI required.       Does the patient have evidence of cognitive impairment? No                                                                                                Health  Risk Assessment    Smoking Status:  Social History     Tobacco Use   Smoking Status Every Day    Current packs/day: 0.50    Average packs/day: 0.5 packs/day for 56.9 years (28.9 ttl pk-yrs)    Types: Cigarettes    Start date: 1968   Smokeless Tobacco Never   Tobacco Comments    Still smoke     Alcohol Consumption:  Social History     Substance and Sexual Activity   Alcohol Use Never       Fall Risk Screen  STEADI Fall Risk Assessment was completed, and patient is at LOW risk for falls.Assessment completed on:2024    Depression Screening   Little interest or pleasure in doing things? Not at all   Feeling down, depressed, or hopeless? Not at all   PHQ-2 Total Score 0      Health Habits and Functional and Cognitive Screenin/17/2024    11:25 AM   Functional & Cognitive Status   Do you have difficulty preparing food and eating? No    Do you have difficulty bathing yourself, getting dressed or grooming yourself? No    Do you have difficulty using " the toilet? No    Do you have difficulty moving around from place to place? No    Do you have trouble with steps or getting out of a bed or a chair? No    Current Diet Well Balanced Diet    Dental Exam Up to date    Eye Exam Not up to date    Exercise (times per week) Other    Current Exercises Include No Regular Exercise;House Cleaning;Walking;Yard Work    Do you need help using the phone?  No    Are you deaf or do you have serious difficulty hearing?  Yes    Do you need help to go to places out of walking distance? No    Do you need help shopping? No    Do you need help preparing meals?  No    Do you need help with housework?  No    Do you need help with laundry? No    Do you need help taking your medications? No    Do you need help managing money? No    Do you ever drive or ride in a car without wearing a seat belt? No    Have you felt unusual stress, anger or loneliness in the last month? No    Who do you live with? Spouse    If you need help, do you have trouble finding someone available to you? No    Have you been bothered in the last four weeks by sexual problems? No    Do you have difficulty concentrating, remembering or making decisions? No        Patient-reported           Age-appropriate Screening Schedule:  Refer to the list below for future screening recommendations based on patient's age, sex and/or medical conditions. Orders for these recommended tests are listed in the plan section. The patient has been provided with a written plan.    Health Maintenance List  Health Maintenance   Topic Date Due    COLORECTAL CANCER SCREENING  12/12/2024    LIPID PANEL  05/03/2025    ANNUAL WELLNESS VISIT  11/19/2025    HEPATITIS C SCREENING  Completed    RSV Vaccine - Adults  Completed    INFLUENZA VACCINE  Completed    Pneumococcal Vaccine 65+  Completed    AAA SCREEN (ONE-TIME)  Completed    COVID-19 Vaccine  Discontinued    URINE MICROALBUMIN  Discontinued    TDAP/TD VACCINES  Discontinued    ZOSTER VACCINE   Discontinued    LUNG CANCER SCREENING  Discontinued                                                                                                                                                CMS Preventative Services Quick Reference  Risk Factors Identified During Encounter  Immunizations Discussed/Encouraged:  consider vaccines at the pharmacy  Dental Screening Recommended  Vision Screening Recommended    The above risks/problems have been discussed with the patient.  Pertinent information has been shared with the patient in the After Visit Summary.  An After Visit Summary and PPPS were made available to the patient.    Follow Up:   Next Medicare Wellness visit to be scheduled in 1 year.         Additional E&M Note during same encounter follows:  Patient has additional, significant, and separately identifiable condition(s)/problem(s) that require work above and beyond the Medicare Wellness Visit     Chief Complaint  Medicare Wellness-subsequent    Subjective   HPI  Patient presents today for Medicare wellness visit and follow-up on chronic conditions    Patient does follow with multiple specialist including infectious disease, hematology/oncology, cardiology, pulmonology, nephrology    Depression screen completed with negative results  Care gaps reviewed prior to today's visit as well  Patient is not interested in a colonoscopy.  He did have occult fecal screening that was negative  With his history of smoking it appears his care gap of AAA screening and low-dose CT the chest are close as these have been completed  PSA completed July 2024 with a result of 1.1  Patient does not have updated dental screenings as he has no teeth  [facility]  Patient is aware to receive all vaccines from the pharmacy  Last lipid panel completed May 2024.  LDL 94    Patient is a current smoker about half pack per day.  He has tried to cut down.  He started smoking in about 1968.   "That is 50+ years.  He has smoked as much as 2 packs/day.  No alcohol use.  No drug use.    Patient's advanced directive is currently on file and up-to-date    Patient does not perform any particular exercise pattern.  He tries to follow a well-balanced diet    Patient is currently following with hematology oncology for non-small cell lung cancer.  He also recently had a mammogram due to a mass noted of the breast.      Patient does follow with pulmonology for malignant neoplasm of the lung as well as emphysema.  -Current medications include albuterol as needed and Trelegy    Patient does follow with cardiology as well.  Noted issues with hypotension, coronary calcifications, PVCs, presence of cardiac pacemaker  -Medications include fludrocortisone, midodrine    Patient is following with nephrology due to chronic kidney disease    Patient is also following with infectious disease    Patient is currently on pravastatin for hyperlipidemia              Objective   Vital Signs:  /68 (BP Location: Left arm, Patient Position: Sitting, Cuff Size: Adult)   Pulse 85   Ht 182.9 cm (72\")   Wt 83.5 kg (184 lb)   SpO2 98%   BMI 24.95 kg/m²   Physical Exam  Vitals and nursing note reviewed.   Constitutional:       Appearance: Normal appearance.      Comments: Patient appears to be feeling well today   HENT:      Head: Normocephalic and atraumatic.   Eyes:      Extraocular Movements: Extraocular movements intact.      Pupils: Pupils are equal, round, and reactive to light.   Cardiovascular:      Rate and Rhythm: Normal rate and regular rhythm.      Pulses: Normal pulses.           Radial pulses are 2+ on the right side and 2+ on the left side.        Posterior tibial pulses are 2+ on the right side and 2+ on the left side.      Heart sounds: Normal heart sounds, S1 normal and S2 normal.   Pulmonary:      Effort: Pulmonary effort is normal.      Breath sounds: Normal breath sounds.   Abdominal:      General: Bowel sounds " are normal.      Palpations: Abdomen is soft.   Musculoskeletal:         General: Normal range of motion.      Right lower leg: No edema.      Left lower leg: No edema.   Skin:     General: Skin is warm and dry.   Neurological:      Mental Status: He is alert and oriented to person, place, and time.      Comments: Mental status fully intact as patient was able to provide a detailed description of the events  Gait and balance intact during today's visit   Psychiatric:         Mood and Affect: Mood normal.         Behavior: Behavior normal.         Thought Content: Thought content normal.         Judgment: Judgment normal.                 Assessment and Plan Additional age appropriate preventative wellness advice topics were discussed during today's preventative wellness exam(some topics already addressed during AWV portion of the note above):   Nutrition: Discussed nutrition plan with patient. Information shared in after visit summary. Goal is for a well balanced diet to enhance overall health.     Healthy Weight: Discussed current and goal BMI with patient. Steps to attain this goal discussed. Information shared in after visit summary.              Encounter for subsequent annual wellness visit (AWV) in Medicare patient    Encounter for well adult exam without abnormal findings    Depression screen    At low risk for fall    Colon cancer screening declined    Stage 4 chronic kidney disease    Tobacco abuse    BMI 24.0-24.9, adult    Malignant neoplasm of lower lobe of right lung    Centrilobular emphysema    Chronic hypotension    PVC's (premature ventricular contractions)    Mixed hyperlipidemia     On statin therapy        Plan  Wellness visit and follow-up on chronic conditions completed with patient today    Patient will continue to follow with pulmonology, cardiology, infectious disease, hematology oncology    Depression screen is negative results    Patient respectfully declined colonoscopy    Consider updated  dental and vision screenings    Please make sure to have updated vaccines at the pharmacy    Continue with healthy low-fat diet.    Consider smoking cessation.  Patient has been working to cut down.    Go to ER if any condition worsens or severe    Labs are ordered and will be obtained today including CBC and comprehensive metabolic panel.  Last lipid panel was completed in February    Plan to follow-up in 6 months for chronic care    Plan follow-up 1 year for wellness visit    Patient declined refills today        Orders Placed This Encounter   Procedures    CBC (No Diff)     Standing Status:   Future     Number of Occurrences:   1     Standing Expiration Date:   11/19/2025     Order Specific Question:   Release to patient     Answer:   Routine Release [[id number]]    Comprehensive Metabolic Panel     Standing Status:   Future     Number of Occurrences:   1     Standing Expiration Date:   11/19/2025     Order Specific Question:   Release to patient     Answer:   Routine Release [[id number]]           I spent 35 minutes caring for David on this date of service. This time includes time spent by me in the following activities:preparing for the visit, reviewing tests, obtaining and/or reviewing a separately obtained history, performing a medically appropriate examination and/or evaluation , counseling and educating the patient/family/caregiver, ordering medications, tests, or procedures, referring and communicating with other health care professionals , and documenting information in the medical record  Follow Up   Return for 6m for chronic care. 1 year for wellness.  Patient was given instructions and counseling regarding his condition or for health maintenance advice. Please see specific information pulled into the AVS if appropriate.

## 2024-11-20 ENCOUNTER — TELEPHONE (OUTPATIENT)
Dept: INTERNAL MEDICINE | Facility: CLINIC | Age: 74
End: 2024-11-20
Payer: MEDICARE

## 2024-11-20 NOTE — TELEPHONE ENCOUNTER
----- Message from Hayley Castellanso sent at 11/20/2024  7:37 AM EST -----  Please let patient know labs resulted.  His creatinine and EGFR related to his kidney function was the exact same as 2 weeks ago.  His blood counts are also similar to previous.  Hemoglobin is at 12.5.  2 weeks ago he was at 12.4.

## 2024-12-09 ENCOUNTER — TELEPHONE (OUTPATIENT)
Dept: ONCOLOGY | Facility: CLINIC | Age: 74
End: 2024-12-09
Payer: MEDICARE

## 2024-12-09 DIAGNOSIS — C34.31 MALIGNANT NEOPLASM OF LOWER LOBE OF RIGHT LUNG: Primary | ICD-10-CM

## 2024-12-09 NOTE — TELEPHONE ENCOUNTER
Caller: ISAÍAS ANDERSON    Relationship: Emergency Contact    Best call back number: [phone]    What is the best time to reach you: ANYTIME    Who are you requesting to speak with (clinical staff, provider,  specific staff member): CLINICAL    What was the call regarding: PATIENT IS DUE TO HAVE LABS DRAWN TOMORROW WHEN HE COMES IN FOR HIS PORT FLUSH - PLEASE ADD ORDER TO THE CHART.     CALL ISAÍAS TO ADVISE THIS WAS COMPLETED.

## 2024-12-10 ENCOUNTER — HOSPITAL ENCOUNTER (OUTPATIENT)
Dept: ONCOLOGY | Facility: HOSPITAL | Age: 74
Discharge: HOME | End: 2024-12-10
Admitting: INTERNAL MEDICINE
Payer: MEDICARE

## 2024-12-10 VITALS
HEART RATE: 77 BPM | RESPIRATION RATE: 18 BRPM | DIASTOLIC BLOOD PRESSURE: 84 MMHG | WEIGHT: 187 LBS | HEIGHT: 72 IN | TEMPERATURE: 97.1 F | BODY MASS INDEX: 25.33 KG/M2 | SYSTOLIC BLOOD PRESSURE: 185 MMHG

## 2024-12-10 DIAGNOSIS — C34.31 MALIGNANT NEOPLASM OF LOWER LOBE OF RIGHT LUNG: Primary | ICD-10-CM

## 2024-12-10 LAB
ALBUMIN SERPL-MCNC: 3.8 G/DL (ref 3.5–5.2)
ALBUMIN/GLOB SERPL: 1.1 G/DL
ALP SERPL-CCNC: 78 U/L (ref 39–117)
ALT SERPL W P-5'-P-CCNC: 13 U/L (ref 1–41)
ANION GAP SERPL CALCULATED.3IONS-SCNC: 11 MMOL/L (ref 5–15)
AST SERPL-CCNC: 15 U/L (ref 1–40)
BASOPHILS # BLD AUTO: 0.03 10*3/MM3 (ref 0–0.2)
BASOPHILS NFR BLD AUTO: 0.3 % (ref 0–1.5)
BILIRUB SERPL-MCNC: 0.2 MG/DL (ref 0–1.2)
BUN SERPL-MCNC: 13 MG/DL (ref 8–23)
BUN/CREAT SERPL: 8.5 (ref 7–25)
CALCIUM SPEC-SCNC: 9.1 MG/DL (ref 8.6–10.5)
CHLORIDE SERPL-SCNC: 108 MMOL/L (ref 98–107)
CO2 SERPL-SCNC: 20 MMOL/L (ref 22–29)
CREAT SERPL-MCNC: 1.53 MG/DL (ref 0.76–1.27)
DEPRECATED RDW RBC AUTO: 49.8 FL (ref 37–54)
EGFRCR SERPLBLD CKD-EPI 2021: 47.1 ML/MIN/1.73
EOSINOPHIL # BLD AUTO: 0.07 10*3/MM3 (ref 0–0.4)
EOSINOPHIL NFR BLD AUTO: 0.8 % (ref 0.3–6.2)
ERYTHROCYTE [DISTWIDTH] IN BLOOD BY AUTOMATED COUNT: 13.6 % (ref 12.3–15.4)
GLOBULIN UR ELPH-MCNC: 3.5 GM/DL
GLUCOSE SERPL-MCNC: 113 MG/DL (ref 65–99)
HCT VFR BLD AUTO: 34.8 % (ref 37.5–51)
HGB BLD-MCNC: 11.6 G/DL (ref 13–17.7)
IMM GRANULOCYTES # BLD AUTO: 0.01 10*3/MM3 (ref 0–0.05)
IMM GRANULOCYTES NFR BLD AUTO: 0.1 % (ref 0–0.5)
LYMPHOCYTES # BLD AUTO: 2.39 10*3/MM3 (ref 0.7–3.1)
LYMPHOCYTES NFR BLD AUTO: 25.9 % (ref 19.6–45.3)
MCH RBC QN AUTO: 33 PG (ref 26.6–33)
MCHC RBC AUTO-ENTMCNC: 33.3 G/DL (ref 31.5–35.7)
MCV RBC AUTO: 98.9 FL (ref 79–97)
MONOCYTES # BLD AUTO: 0.89 10*3/MM3 (ref 0.1–0.9)
MONOCYTES NFR BLD AUTO: 9.6 % (ref 5–12)
NEUTROPHILS NFR BLD AUTO: 5.85 10*3/MM3 (ref 1.7–7)
NEUTROPHILS NFR BLD AUTO: 63.3 % (ref 42.7–76)
PLATELET # BLD AUTO: 251 10*3/MM3 (ref 140–450)
PMV BLD AUTO: 8.2 FL (ref 6–12)
POTASSIUM SERPL-SCNC: 3.8 MMOL/L (ref 3.5–5.2)
PROT SERPL-MCNC: 7.3 G/DL (ref 6–8.5)
RBC # BLD AUTO: 3.52 10*6/MM3 (ref 4.14–5.8)
SODIUM SERPL-SCNC: 139 MMOL/L (ref 136–145)
WBC NRBC COR # BLD AUTO: 9.24 10*3/MM3 (ref 3.4–10.8)

## 2024-12-10 PROCEDURE — 80053 COMPREHEN METABOLIC PANEL: CPT | Performed by: INTERNAL MEDICINE

## 2024-12-10 PROCEDURE — 25010000002 HEPARIN LOCK FLUSH PER 10 UNITS: Performed by: INTERNAL MEDICINE

## 2024-12-10 PROCEDURE — 36591 DRAW BLOOD OFF VENOUS DEVICE: CPT

## 2024-12-10 PROCEDURE — 85025 COMPLETE CBC W/AUTO DIFF WBC: CPT | Performed by: INTERNAL MEDICINE

## 2024-12-10 RX ORDER — HEPARIN SODIUM (PORCINE) LOCK FLUSH IV SOLN 100 UNIT/ML 100 UNIT/ML
500 SOLUTION INTRAVENOUS AS NEEDED
Status: DISCONTINUED | OUTPATIENT
Start: 2024-12-10 | End: 2024-12-11 | Stop reason: HOSPADM

## 2024-12-10 RX ADMIN — HEPARIN 500 UNITS: 100 SYRINGE at 14:40

## 2024-12-13 ENCOUNTER — TELEPHONE (OUTPATIENT)
Dept: ONCOLOGY | Facility: CLINIC | Age: 74
End: 2024-12-13
Payer: MEDICARE

## 2024-12-13 NOTE — TELEPHONE ENCOUNTER
----- Message from Neetu Ashley sent at 12/10/2024  3:46 PM EST -----  Please notify patient of stable results

## 2024-12-13 NOTE — TELEPHONE ENCOUNTER
Advised patient per MD regarding recent labs.  Patient's SO asked if patient can be checked for leukemia since he has a family h/o it and his RBC is low and has been low.  Advised her RN will discuss with Dr. Ashley when she returns to office next week and return her call with MD conway.  She verbalized understanding.

## 2024-12-15 DIAGNOSIS — E78.2 MIXED HYPERLIPIDEMIA: ICD-10-CM

## 2024-12-16 RX ORDER — PRAVASTATIN SODIUM 80 MG/1
80 TABLET ORAL NIGHTLY
Qty: 90 TABLET | Refills: 1 | Status: SHIPPED | OUTPATIENT
Start: 2024-12-16

## 2024-12-16 NOTE — TELEPHONE ENCOUNTER
Last appointment: 11/19/2024  Next appointment: 5/19/2025       Last Refill: 6/18/2024 quantity of 90 with 1 refill

## 2025-01-08 ENCOUNTER — TELEPHONE (OUTPATIENT)
Dept: ONCOLOGY | Facility: CLINIC | Age: 75
End: 2025-01-08

## 2025-01-08 NOTE — TELEPHONE ENCOUNTER
Caller: ISAÍAS ANDERSON    Relationship to patient: Emergency Contact    Best call back number: [phone]    Type of visit: PORT FLUSH, FU1    Requested date: WEEK OF 1/20 (EXCEPT 1/22) , PREFERS APPT AROUND 1PM    If rescheduling, when is the original appointment: 1/14     Additional notes:PT'S PET SCAN WAS MOVED TO 1/16

## 2025-01-16 ENCOUNTER — HOSPITAL ENCOUNTER (OUTPATIENT)
Dept: PET IMAGING | Facility: HOSPITAL | Age: 75
Discharge: HOME | End: 2025-01-16
Payer: MEDICARE

## 2025-01-16 DIAGNOSIS — C34.31 MALIGNANT NEOPLASM OF LOWER LOBE OF RIGHT LUNG: ICD-10-CM

## 2025-01-16 LAB — GLUCOSE BLDC GLUCOMTR-MCNC: 88 MG/DL (ref 70–130)

## 2025-01-16 PROCEDURE — A9552 F18 FDG: HCPCS | Performed by: INTERNAL MEDICINE

## 2025-01-16 PROCEDURE — 78815 PET IMAGE W/CT SKULL-THIGH: CPT

## 2025-01-16 PROCEDURE — 34310000005 FLUDEOXYGLUCOSE F18 SOLUTION: Performed by: INTERNAL MEDICINE

## 2025-01-16 PROCEDURE — 82948 REAGENT STRIP/BLOOD GLUCOSE: CPT

## 2025-01-16 RX ADMIN — FLUDEOXYGLUCOSE F 18 1 DOSE: 200 INJECTION, SOLUTION INTRAVENOUS at 14:31

## 2025-01-19 NOTE — PLAN OF CARE
[facility]  Chronic Interventional Pain Service  Clinic Procedure Note    NAME: Delores Person    : 1978    MRN: [id number]    DATE OF PROCEDURE: 25    ATTENDING SURGEON: James Mosquera DO    PROCEDURE: Trigger Point Injection, Left Side    PRE-OPERATIVE DIAGNOSIS: Myofascial pain, dystonia    POST-OPERATIVE DIAGNOSIS: same as above     BLOOD LOSS: minimal     COMPLICATIONS: none     INDICATIONS:  Delores Person is a 46 year old female patient with a history of severe pain of the left cervical and occipital regions. This is accompanied by painful range of motion and painful muscle spasms. They present today for therapeutic trigger point injections.    TECHNIQUE:  The patient was identified in the holding area, brought to the procedure room, and all questions were answered. The risks, benefits, and alternatives of the procedure were discussed with the patient and informed consent was obtained. The trigger points were identified and marked with a skin marker. These locations included the following muscles: left side SCM, anterior scalene, and middle scalene. A total of 3 trigger points were identified in the muscles. The skin overlying the trigger points was localized with 1% lidocaine on a 25g needle, using a total of 3 cc. A solution was created of 6 cc of 0.25% bupivacaine and 80 mg of DepoMedrol. Then a 22 gauge 2\" inch B-bevel needle was used to access each of the trigger points sequentially. Aspiration was negative for blood, and 2 mL of the solution was injected at each site. After the injections, the patient was alert, awake, and oriented. The patient noted 50 % relief. Vital sings were stable and the patient deemed stable for discharge. Total number of muscles injected: 3. Ultrasound guidance was used to facilitate needle placement to the appropriate site and images are saved for permanent recording and reporting.    Total 80mg DepoMedrol used    James  Goal Outcome Evaluation:  Plan of Care Reviewed With: patient, family, significant other        Progress: improving  Outcome Evaluation: PT eval completed. Presents w/ CDiff, sympt orthostat hypot, low HGB 8.4 (decr from 10.3 yest), recent R thoracot/RLL resect. 1/'24, persist. R pleur.eff., decr LE strength/endurance & impaired funct mobil below baseline. Performed rolling L/R & transf to EOB w/ supv, then ther. exer sup. & EOB per issued HEP, & sit bal. activ & PLB exer. x 5 min (, & 2 sitting BP's 113/74 & 101/74, w/ no orthostat sympt. noted), but def. standing d/t just recently UIC & ret. to bed (Dizzy while standing w/ OT). Recommend home w/ family's assist and OPPT f/u at d/c.      Anticipated Discharge Disposition (PT): home with assist, home with outpatient therapy services                         DO Eveline  Dept. of Anesthesiology  Division of Interventional Pain Medicine  [facility]

## 2025-01-21 ENCOUNTER — HOSPITAL ENCOUNTER (OUTPATIENT)
Dept: ONCOLOGY | Facility: HOSPITAL | Age: 75
Discharge: HOME | End: 2025-01-21
Admitting: INTERNAL MEDICINE
Payer: MEDICARE

## 2025-01-21 ENCOUNTER — OFFICE VISIT (OUTPATIENT)
Dept: ONCOLOGY | Facility: CLINIC | Age: 75
End: 2025-01-21
Payer: MEDICARE

## 2025-01-21 VITALS
TEMPERATURE: 97.8 F | HEIGHT: 72 IN | HEART RATE: 88 BPM | WEIGHT: 187 LBS | BODY MASS INDEX: 25.33 KG/M2 | SYSTOLIC BLOOD PRESSURE: 165 MMHG | OXYGEN SATURATION: 100 % | DIASTOLIC BLOOD PRESSURE: 92 MMHG

## 2025-01-21 DIAGNOSIS — G89.29 CHRONIC NONINTRACTABLE HEADACHE, UNSPECIFIED HEADACHE TYPE: ICD-10-CM

## 2025-01-21 DIAGNOSIS — M54.2 NECK PAIN: ICD-10-CM

## 2025-01-21 DIAGNOSIS — E27.40 ADRENAL INSUFFICIENCY: ICD-10-CM

## 2025-01-21 DIAGNOSIS — R94.6 ABNORMAL RESULTS OF THYROID FUNCTION STUDIES: ICD-10-CM

## 2025-01-21 DIAGNOSIS — R51.9 CHRONIC NONINTRACTABLE HEADACHE, UNSPECIFIED HEADACHE TYPE: ICD-10-CM

## 2025-01-21 DIAGNOSIS — C34.31 MALIGNANT NEOPLASM OF LOWER LOBE OF RIGHT LUNG: Primary | ICD-10-CM

## 2025-01-21 LAB
FERRITIN SERPL-MCNC: 138.6 NG/ML (ref 30–400)
FOLATE SERPL-MCNC: 12.9 NG/ML (ref 4.78–24.2)
IRON 24H UR-MRATE: 69 MCG/DL (ref 59–158)
IRON SATN MFR SERPL: 21 % (ref 20–50)
TIBC SERPL-MCNC: 325 MCG/DL (ref 298–536)
TRANSFERRIN SERPL-MCNC: 218 MG/DL (ref 200–360)
TSH SERPL DL<=0.05 MIU/L-ACNC: 2.03 UIU/ML (ref 0.27–4.2)
VIT B12 BLD-MCNC: 265 PG/ML (ref 211–946)

## 2025-01-21 PROCEDURE — 82728 ASSAY OF FERRITIN: CPT | Performed by: INTERNAL MEDICINE

## 2025-01-21 PROCEDURE — 83521 IG LIGHT CHAINS FREE EACH: CPT | Performed by: INTERNAL MEDICINE

## 2025-01-21 PROCEDURE — 3077F SYST BP >= 140 MM HG: CPT | Performed by: INTERNAL MEDICINE

## 2025-01-21 PROCEDURE — 84155 ASSAY OF PROTEIN SERUM: CPT | Performed by: INTERNAL MEDICINE

## 2025-01-21 PROCEDURE — 82784 ASSAY IGA/IGD/IGG/IGM EACH: CPT | Performed by: INTERNAL MEDICINE

## 2025-01-21 PROCEDURE — 82607 VITAMIN B-12: CPT | Performed by: INTERNAL MEDICINE

## 2025-01-21 PROCEDURE — 25010000002 HEPARIN LOCK FLUSH PER 10 UNITS: Performed by: INTERNAL MEDICINE

## 2025-01-21 PROCEDURE — 84443 ASSAY THYROID STIM HORMONE: CPT | Performed by: INTERNAL MEDICINE

## 2025-01-21 PROCEDURE — 83540 ASSAY OF IRON: CPT | Performed by: INTERNAL MEDICINE

## 2025-01-21 PROCEDURE — 1126F AMNT PAIN NOTED NONE PRSNT: CPT | Performed by: INTERNAL MEDICINE

## 2025-01-21 PROCEDURE — 84466 ASSAY OF TRANSFERRIN: CPT | Performed by: INTERNAL MEDICINE

## 2025-01-21 PROCEDURE — 86334 IMMUNOFIX E-PHORESIS SERUM: CPT | Performed by: INTERNAL MEDICINE

## 2025-01-21 PROCEDURE — 82746 ASSAY OF FOLIC ACID SERUM: CPT | Performed by: INTERNAL MEDICINE

## 2025-01-21 PROCEDURE — 36591 DRAW BLOOD OFF VENOUS DEVICE: CPT

## 2025-01-21 PROCEDURE — 99214 OFFICE O/P EST MOD 30 MIN: CPT | Performed by: INTERNAL MEDICINE

## 2025-01-21 PROCEDURE — 84165 PROTEIN E-PHORESIS SERUM: CPT | Performed by: INTERNAL MEDICINE

## 2025-01-21 PROCEDURE — 3080F DIAST BP >= 90 MM HG: CPT | Performed by: INTERNAL MEDICINE

## 2025-01-21 PROCEDURE — 82525 ASSAY OF COPPER: CPT | Performed by: INTERNAL MEDICINE

## 2025-01-21 RX ORDER — HEPARIN SODIUM (PORCINE) LOCK FLUSH IV SOLN 100 UNIT/ML 100 UNIT/ML
500 SOLUTION INTRAVENOUS AS NEEDED
Status: DISCONTINUED | OUTPATIENT
Start: 2025-01-21 | End: 2025-01-22 | Stop reason: HOSPADM

## 2025-01-21 RX ORDER — SODIUM CHLORIDE 0.9 % (FLUSH) 0.9 %
10 SYRINGE (ML) INJECTION AS NEEDED
Status: DISCONTINUED | OUTPATIENT
Start: 2025-01-21 | End: 2025-01-22 | Stop reason: HOSPADM

## 2025-01-21 RX ADMIN — HEPARIN 500 UNITS: 100 SYRINGE at 12:55

## 2025-01-21 NOTE — PROGRESS NOTES
Hematology and On[facility] [place]  Office number [phone]    Fax number [phone]     Follow up     Date: 25    Patient Name: David Barfield  MRN: [id number]  : 1950    Referring Physician: Dr. Sampson    Chief Complaint: Nonsmall cell lung cancer follow-up on treatment    Cancer Staging:  Cancer Staging   Stage IIIA (cT2b, cN2, cM0)    History of Present Illness: David Barfield is a pleasant 74 y.o. male who presents today for evaluation of NSCLC. He has a 50 pack year smoking history.    He has a history of a PET avid subpleural RLL lung nodule [facility] [place] [facility]. This had an SUV of 9.8 on PET  in association with increased mediastinal LN uptake with SUV around 3. Imaging was felt secondary to osteophyte fibrosis. He did undergo bronchoscopy 2020 with negative cytology. The RLL lung nodule was not felt amenable to bronchoscopy biopsy.    CT lung screen 2023 showed:      PET CT showed mass in the RLL intensely SUV avid, max 17. Mediastinal LN not hypermetabolic above baseline.     Right lower lobe robotic transbronchial biopsy and cytology on 9/15/2023 showed benign findings. Cultures including AFT and fungal were negative.  Station 11L, Station 4L LN negative  Station 7 LN showed NSCLCa (positive for cytokeratin 7, p63, and TTF-1 with no significant staining for p40 or Napsin. The staining pattern raises the possibility of mixed adenocarcinoma and squamous cell carcinoma differentiation in this tumor). PDL1 negative.    Tempus negative for targetable mutations on liquid biopsy. QNS on tissue at diagnosis.    MRI brain was negative.    He has a history of sick sinus syndrome s/p PPM and moderate COPD. He describes only mild physical limitations from this. For example he recently walked 1.5 miles at [employer]. At home, he climbs 17 steps without issue. He does sit down with long activities.     Following neoadjuvant chemoimmunotherapy  he underwent lobectomy and mediastinal lymph node dissection on January 9, 2024.  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  Margins were negative.  Regional lymph nodes including 4R, 12 R, and 7 were negative as were 5 intralobar lymph nodes.    Treatment history:  Carbo, taxol, nivo, C1 10/24/23; C2 11/15/23; C3 11/21/23  Atezolizumab maintenance, cycle 1 2/6/2024; cycle 2 2/27/24: stopped for intolerance (nausea, malaise, poor QOL) but no severe immune events  Opdivo maintenance, cycle 1: 4/4/24: terminated for ARACELI    Interval history:   Mr. Barfield is here in the company of his significant other for follow up.   He notes a greater than 1 month history of right occipital pain radiatng to the right frontal region which is happening on a nearby daily basis.  He has tried a muscle relaxer and Tylenol, which did help him sleep, but the symptoms have recurred.  He continues to follow with nephrology.  He is on fludrocortisone which she takes on a near daily basis, but he does note that he skips it if his blood pressure is elevated.  If his blood pressure is elevated he takes an old lisinopril prescription.  This happens about once a month.  He still has episodes where his blood pressure drops and he becomes symptomatic, but these are infrequent.  They do have questions regarding follow-up with endocrinology for this issue.  His significant other follows with Dr. Garcia.   They also have questions regarding his PET CT results and blood counts. He notes he does not want to proceed with a bone marrow biopsy.    Past Medical History:   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    Coronary artery disease 2019    Diabetes mellitus Borderline    Emphysema/COPD     Erectile disorder      GERD (gastroesophageal reflux disease)     History of chemotherapy     Hyperlipidemia     Hypertension 2019    Lung nodule     Mumps     Mumps     Pruritus     after bath    Slow to wake up after anesthesia     Stage 5 chronic kidney disease not on chronic dialysis 05/14/2024    Wears dentures     upper only    Wears hearing aid in both ears     usually only wears right   No personal history of myocardial infarction, cerebrovascular event, or venous thromboembolism.  No autoimmune diseases.   No prior cscope, mailed cologuard recently    Past Surgical History:   Past Surgical History:   Procedure Laterality Date    BONE BIOPSY      broken bone surgery in his face    BRONCHOSCOPY THORACOTOMY Right 01/09/2024    Procedure: THORACOTOMY FOR LOWER LOBECTOMY AND MEDISTINAL LYMPH NODE DISSECTION RIGHT;  Surgeon: Joey Patel MD;  Location:  [facility];  Service: Cardiothoracic;  Laterality: Right;    BRONCHOSCOPY WITH ION ROBOTIC ASSIST N/A 09/15/2023    Procedure: BRONCHOSCOPY NAVIGATION WITH ENDOBRONCHIAL ULTRASOUND AND ION ROBOT;  Surgeon: Octaviano Sampson MD;  Location:  [facility];  Service: Robotics - Pulmonary;  Laterality: N/A;  ion #6 - [id number]  - 0015  Cath guide [id number]    EBUS balloon removed and intact    CARDIAC ELECTROPHYSIOLOGY PROCEDURE N/A 08/17/2021    Procedure: Pacemaker DC new;  Surgeon: Kayy Box MD;  Location:  [facility];  Service: Cardiology;  Laterality: N/A;    FACIAL FRACTURE SURGERY      LYMPH NODE BIOPSY  2023    PACEMAKER IMPLANTATION       Family History:   Family History   Problem Relation Age of Onset    Aneurysm Mother         brain    Dementia Father     Leukemia Sister     Cancer Sister     Heart disease Paternal Grandmother     Hypertension Paternal Grandfather     Breast cancer Neg Hx     Ovarian cancer Neg Hx    Sister leukemia at age 21  No other malignancy    Social History:   Social History     Socioeconomic History    Marital status:  Single    Number of children: 3   Tobacco Use    Smoking status: Every Day     Current packs/day: 0.50     Average packs/day: 0.5 packs/day for 57.1 years (29.0 ttl pk-yrs)     Types: Cigarettes     Start date: 1/1/1968    Smokeless tobacco: Never    Tobacco comments:     Still smoke   Vaping Use    Vaping status: Never Used   Substance and Sexual Activity    Alcohol use: Never    Drug use: Never    Sexual activity: Yes     Partners: Female     Birth control/protection: None   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, [place]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 OTC, Disp: , Rfl:     ferrous sulfate 325 (65 FE) MG tablet, Take 1 tablet by mouth Daily With Breakfast. OTC, Disp: , Rfl:     fludrocortisone 0.1 MG tablet, Take 1 tablet by mouth Daily., Disp: 90 tablet, Rfl: 1    fluticasone (VERAMYST) 27.5 MCG/SPRAY nasal spray, 2 sprays into the nostril(s) as directed by provider 1 (One) Time As Needed for Rhinitis or Allergies., Disp: 6 mL, Rfl: 2    Fluticasone-Umeclidin-Vilant (Trelegy Ellipta) 100-62.5-25 MCG/ACT inhaler, Inhale 1 puff Daily., Disp: 3 each, Rfl: 3    HYDROcodone-acetaminophen (Norco) 7.5-325 MG per tablet, Take 1 tablet by mouth Every 6 (Six) Hours As Needed for Moderate Pain., Disp: 60 tablet, Rfl: 0    lidocaine-prilocaine (EMLA) 2.5-2.5 % cream, Apply 1 application  topically to the appropriate area as directed As Needed (45-60 minutes prior to port access.  Cover with saran/plastic wrap.)., Disp: 30 g, Rfl: 3    magnesium chloride ER 64 MG DR tablet, Take 1 tablet by mouth Daily., Disp: , Rfl:     midodrine (PROAMATINE) 10 MG tablet, Take 1 tablet by mouth 3 (Three) Times a Day As Needed (Systolic BP  "less then 100)., Disp: 30 tablet, Rfl: 0    omeprazole (priLOSEC) 40 MG capsule, Take 1 capsule by mouth Daily., Disp: 30 capsule, Rfl: 5    ondansetron (ZOFRAN) 4 MG tablet, , Disp: , Rfl:     ondansetron (ZOFRAN) 8 MG tablet, Take 1 tablet by mouth 3 (Three) Times a Day As Needed for Nausea or Vomiting., Disp: 30 tablet, Rfl: 2    pravastatin (PRAVACHOL) 80 MG tablet, Take 1 tablet by mouth Every Night., Disp: 90 tablet, Rfl: 1    sodium bicarbonate 650 MG tablet, Take 2 tablets by mouth 3 times a day., Disp: , Rfl:     Allergies:   Allergies   Allergen Reactions    Cymbalta [Duloxetine Hcl] GI Intolerance    Gabapentin Mental Status Change     Pt states that this medication \"makes him feel foolish in his head\".     Remeron [Mirtazapine] Other (See Comments)     Excess sedation    Toradol [Ketorolac Tromethamine] GI Intolerance     Projectile vomiting     Latex Other (See Comments)     Latex allergy     Tape Rash       Objective     Vital Signs:   Vitals:    01/21/25 1143   BP: 165/92   Pulse: 88   Temp: 97.8 °F (36.6 °C)   TempSrc: Infrared   SpO2: 100%   Weight: 84.8 kg (187 lb)   Height: 182.9 cm (72.01\")   PainSc: 0-No pain      Body mass index is 25.36 kg/m².   Pain Score    01/21/25 1143   PainSc: 0-No pain         ECOG Performance Status: 1 - Symptomatic but completely ambulatory    Physical Exam:   General: No acute distress.   HEENT: Normocephalic, atraumatic. Sclera anicteric.   Neck: supple, no adenopathy. No palpable mass in his area of pain in the right occipital region.  Cardiovascular: RRR no murmurs  Respiratory: Clear to auscultation bilaterally.  Abdomen: Soft, nontender, non distended  Lymph: no cervical, supraclavicular adenopathy  Neuro: Alert and oriented x 3. No focal deficits.   Ext: Symmetric, no swelling.   Skin: no rash.    Laboratory/Imaging Reviewed:   Hospital Outpatient Visit on 01/16/2025   Component Date Value Ref Range Status    Glucose 01/16/2025 88  70 - 130 mg/dL Final       NM " PET/CT Skull Base to Mid Thigh    Result Date: 1/18/2025  Narrative: FDG NM PET/CT SKULL BASE TO MID THIGH Date of Exam: 1/16/2025 2:15 PM EST Indication: lung cancer. Comparison: 9/18/2024 Technique: 12.2 mCi of F-18 FDG was administered intravenously. PET imaging was obtained from skull base to mid-thigh approximately 60 minutes after radiotracer injection. A low dose non contrast CT was obtained for attenuation correction and anatomic localization. Fused PET-CT and 3D MIP reconstructions were utilized for image interpretation.  Fasting blood glucose level: 88 mg/dl. Reference uptake values: Mediastinum: 2.2 SUVmax Liver: 2.7 SUVmax Normalization method: Body Weight Findings: Head and neck: No abnormal FDG activity identified. Chest: There are mildly prominent, not enlarged prevascular and precarinal lymph nodes with an SUV maximum of 3.4. These are slightly more prominent than on the prior exam. Posttreatment changes within the lungs. No suspicious or FDG avid pulmonary nodules. Abdomen and pelvis: No abnormal FDG activity identified. Musculoskeletal: No abnormal FDG activity identified.     Impression: Impression: 1.Mildly prominent mediastinal lymph nodes which demonstrate low-level FDG activity. These are favored to be reactive, recommend attention on follow-up exam. 2.No evidence of recurrent or metastatic disease within the neck, chest, abdomen, or pelvis. Electronically Signed: Micha Millan MD  1/18/2025 2:35 PM EST  Workstation ID: [id number]     Procedures    Assessment / Plan      Assessment/Plan:     Nonsmall cell lung cancer of the RLL, Stage IIIA  Indeterminate LN in the mediastinum  Persistent headaches and neck pain  -He has an enlarging RLL nodule with SUV of 17. Despite negative transbronchial biopsy, he was found to have N2 disease with a positive level 7 LN. We discussed options for definitive treatment to include induction chemo immunotherapy followed by surgical resection, chemoradiation  followed by surgical resection, or definitive chemoradiation. We discussed differences in schedules and side effects. He has no contraindications to immunotherapy and I recommended nivolumab + chemotherapy induction.  His case was reviewed at multidisciplinary tumor board including thoracic surgery.  He was felt to be a good candidate for neoadjuvant chemo immunotherapy followed by resection assessment.  He completed 3 cycles of  nivolumab, carboplatin, paclitaxel x3 cycles in a neoadjuvant fashion.  He underwent right lower lobectomy and lymph node dissection.  -Final pathology reviewed and shows 1.4 cm of residual disease with extensive treatment effect.  The previously biopsied lymph node was negative on mediastinal dissection. Because his original bronchoscopy specimen was QNS for Tempus tissue testing, I ordered repeat Tempus on the resection specimen to rule out any EGFR or ALK mutation.  There was no mutation on liquid biopsy.  -His case was reviewed at multidisciplinary tumor board and consensus was to proceed with adjuvant immunotherapy without any indication for further chemotherapy or adjuvant radiation.  We proceeded with atezolizumab per the [id number] regimen as per NCCN guidelines. He has received 2 cycles of adjuvant immunotherapy with atezolizumab.  He reports substantial treatment related side effects.  He has not experienced any severe immunotherapy related adverse events that would necessitate cessation of all immunotherapy drugs.  However he has had intolerable side effects on atezolizumab including severe nausea and fatigue that last for a week and impair his quality of life, such that he does not feel he can continue with the atezolizumab treatments.  Because he had an excellent response to chemoimmunotherapy with the use of nivolumab, because he cannot continue the atezolizumab due to side effects, and because he did not have any severe immune related adverse events that would be considered a  contraindication to further immunotherapy, I recommended that we proceed with adjuvant nivolumab.  He then had recurrent admissions with infections, but also recurrent hypotension, ARACELI. He is doing better. Will omit further immunotherapy  -PET CT from 1/2025 personally reviewed and compared to prior.  He has some stable lymph nodes in his mediastinum with borderline SUV uptake which have not changed from previous.  There is no adenopathy in his posterior neck.  -Because of his persistent neck pain and headaches in the context of his malignancy history, I recommended obtaining an MRI brain and cervical spine with and without contrast.  He is due for port labs which we will obtain today.  Assuming his MRI is showing no concerning findings, he will follow-up in 3 months with serial PET to monitor for disease recurrence and monitor indeterminate lymph nodes in his mediastinum  -MRI with no metastatic disease  -Due for port labs which we will obtain today.    4.  Adrenal insufficiency   -On fludrocortisone.    -Referral to endocrinology.      5. CKD  -Following with nephrology  -CMP pending.  -I recommended he discuss with nephrology prior to using as needed medications for his blood pressure.    6.  Anemia  -Mild and stable over several months.  He is taking several vitamin supplements including iron.   -Repeat labs to include:  Orders Placed This Encounter   Procedures    MRI Brain With & Without Contrast    MRI Cervical Spine With & Without Contrast    NM PET/CT Skull Base to Mid Thigh    Comprehensive Metabolic Panel    Iron Profile    Ferritin    Vitamin B12    Folate    Copper, Serum    NIKOLAS, PE & Free LT Chains, Ser    TSH    Ambulatory Referral to Endocrinology    CBC & Differential   -If they desire definitive diagnosis we can proceed with a bone marrow biopsy, but given overall stability and mild nature of anemia he prefers to avoid invasive procedures.    Follow-up   3 mo     Neetu Ashley MD  Hematology and  Oncology

## 2025-01-23 ENCOUNTER — TELEPHONE (OUTPATIENT)
Dept: ONCOLOGY | Facility: CLINIC | Age: 75
End: 2025-01-23
Payer: MEDICARE

## 2025-01-23 ENCOUNTER — HOSPITAL ENCOUNTER (OUTPATIENT)
Dept: ONCOLOGY | Facility: HOSPITAL | Age: 75
Discharge: HOME | End: 2025-01-23
Admitting: INTERNAL MEDICINE
Payer: MEDICARE

## 2025-01-23 VITALS
TEMPERATURE: 97.9 F | DIASTOLIC BLOOD PRESSURE: 73 MMHG | RESPIRATION RATE: 16 BRPM | HEART RATE: 79 BPM | WEIGHT: 188 LBS | HEIGHT: 72 IN | SYSTOLIC BLOOD PRESSURE: 123 MMHG | BODY MASS INDEX: 25.47 KG/M2

## 2025-01-23 DIAGNOSIS — C34.31 MALIGNANT NEOPLASM OF LOWER LOBE OF RIGHT LUNG: Primary | ICD-10-CM

## 2025-01-23 LAB
ALBUMIN SERPL ELPH-MCNC: 3.7 G/DL (ref 2.9–4.4)
ALBUMIN SERPL-MCNC: 3.8 G/DL (ref 3.5–5.2)
ALBUMIN/GLOB SERPL: 1.1 {RATIO} (ref 0.7–1.7)
ALBUMIN/GLOB SERPL: 1.2 G/DL
ALP SERPL-CCNC: 77 U/L (ref 39–117)
ALPHA1 GLOB SERPL ELPH-MCNC: 0.2 G/DL (ref 0–0.4)
ALPHA2 GLOB SERPL ELPH-MCNC: 0.9 G/DL (ref 0.4–1)
ALT SERPL W P-5'-P-CCNC: 10 U/L (ref 1–41)
ANION GAP SERPL CALCULATED.3IONS-SCNC: 10 MMOL/L (ref 5–15)
AST SERPL-CCNC: 14 U/L (ref 1–40)
B-GLOBULIN SERPL ELPH-MCNC: 1.1 G/DL (ref 0.7–1.3)
BASOPHILS # BLD AUTO: 0.03 10*3/MM3 (ref 0–0.2)
BASOPHILS NFR BLD AUTO: 0.3 % (ref 0–1.5)
BILIRUB SERPL-MCNC: 0.3 MG/DL (ref 0–1.2)
BUN SERPL-MCNC: 16 MG/DL (ref 8–23)
BUN/CREAT SERPL: 11.4 (ref 7–25)
CALCIUM SPEC-SCNC: 9.4 MG/DL (ref 8.6–10.5)
CHLORIDE SERPL-SCNC: 105 MMOL/L (ref 98–107)
CO2 SERPL-SCNC: 23 MMOL/L (ref 22–29)
CREAT SERPL-MCNC: 1.4 MG/DL (ref 0.76–1.27)
DEPRECATED RDW RBC AUTO: 47 FL (ref 37–54)
EGFRCR SERPLBLD CKD-EPI 2021: 52.4 ML/MIN/1.73
EOSINOPHIL # BLD AUTO: 0.06 10*3/MM3 (ref 0–0.4)
EOSINOPHIL NFR BLD AUTO: 0.5 % (ref 0.3–6.2)
ERYTHROCYTE [DISTWIDTH] IN BLOOD BY AUTOMATED COUNT: 12.9 % (ref 12.3–15.4)
GAMMA GLOB SERPL ELPH-MCNC: 1.3 G/DL (ref 0.4–1.8)
GLOBULIN SER-MCNC: 3.5 G/DL (ref 2.2–3.9)
GLOBULIN UR ELPH-MCNC: 3.1 GM/DL
GLUCOSE SERPL-MCNC: 129 MG/DL (ref 65–99)
HCT VFR BLD AUTO: 35.9 % (ref 37.5–51)
HGB BLD-MCNC: 11.9 G/DL (ref 13–17.7)
IGA SERPL-MCNC: 279 MG/DL (ref 61–437)
IGG SERPL-MCNC: 1345 MG/DL (ref 603–1613)
IGM SERPL-MCNC: 101 MG/DL (ref 15–143)
IMM GRANULOCYTES # BLD AUTO: 0.01 10*3/MM3 (ref 0–0.05)
IMM GRANULOCYTES NFR BLD AUTO: 0.1 % (ref 0–0.5)
INTERPRETATION SERPL IEP-IMP: ABNORMAL
KAPPA LC FREE SER-MCNC: 61.1 MG/L (ref 3.3–19.4)
KAPPA LC FREE/LAMBDA FREE SER: 1.89 {RATIO} (ref 0.26–1.65)
LABORATORY COMMENT REPORT: ABNORMAL
LAMBDA LC FREE SERPL-MCNC: 32.4 MG/L (ref 5.7–26.3)
LYMPHOCYTES # BLD AUTO: 2.09 10*3/MM3 (ref 0.7–3.1)
LYMPHOCYTES NFR BLD AUTO: 18.1 % (ref 19.6–45.3)
M PROTEIN SERPL ELPH-MCNC: ABNORMAL G/DL
MCH RBC QN AUTO: 32.8 PG (ref 26.6–33)
MCHC RBC AUTO-ENTMCNC: 33.1 G/DL (ref 31.5–35.7)
MCV RBC AUTO: 98.9 FL (ref 79–97)
MONOCYTES # BLD AUTO: 0.66 10*3/MM3 (ref 0.1–0.9)
MONOCYTES NFR BLD AUTO: 5.7 % (ref 5–12)
NEUTROPHILS NFR BLD AUTO: 75.3 % (ref 42.7–76)
NEUTROPHILS NFR BLD AUTO: 8.69 10*3/MM3 (ref 1.7–7)
PLATELET # BLD AUTO: 210 10*3/MM3 (ref 140–450)
PMV BLD AUTO: 8.2 FL (ref 6–12)
POTASSIUM SERPL-SCNC: 3.6 MMOL/L (ref 3.5–5.2)
PROT SERPL-MCNC: 6.9 G/DL (ref 6–8.5)
PROT SERPL-MCNC: 7.2 G/DL (ref 6–8.5)
RBC # BLD AUTO: 3.63 10*6/MM3 (ref 4.14–5.8)
SODIUM SERPL-SCNC: 138 MMOL/L (ref 136–145)
WBC NRBC COR # BLD AUTO: 11.54 10*3/MM3 (ref 3.4–10.8)

## 2025-01-23 PROCEDURE — 85025 COMPLETE CBC W/AUTO DIFF WBC: CPT | Performed by: INTERNAL MEDICINE

## 2025-01-23 PROCEDURE — 25010000002 HEPARIN LOCK FLUSH PER 10 UNITS: Performed by: INTERNAL MEDICINE

## 2025-01-23 PROCEDURE — 36591 DRAW BLOOD OFF VENOUS DEVICE: CPT

## 2025-01-23 PROCEDURE — 80053 COMPREHEN METABOLIC PANEL: CPT | Performed by: INTERNAL MEDICINE

## 2025-01-23 RX ORDER — HEPARIN SODIUM (PORCINE) LOCK FLUSH IV SOLN 100 UNIT/ML 100 UNIT/ML
500 SOLUTION INTRAVENOUS AS NEEDED
Status: DISCONTINUED | OUTPATIENT
Start: 2025-01-23 | End: 2025-01-24 | Stop reason: HOSPADM

## 2025-01-23 RX ORDER — SODIUM CHLORIDE 0.9 % (FLUSH) 0.9 %
10 SYRINGE (ML) INJECTION AS NEEDED
Status: DISCONTINUED | OUTPATIENT
Start: 2025-01-23 | End: 2025-01-24 | Stop reason: HOSPADM

## 2025-01-23 RX ADMIN — HEPARIN 500 UNITS: 100 SYRINGE at 13:40

## 2025-01-23 RX ADMIN — Medication 10 ML: at 13:40

## 2025-01-23 NOTE — TELEPHONE ENCOUNTER
Notified patient and Riccardosherin that CBC and CMP not drawn this week with other lab orders and that it cannot be added on in the lab and he has to be re-drawn.  Both verbalized understanding.  Patient scheduled for lab draw and notified.

## 2025-01-24 DIAGNOSIS — R91.1 NODULE OF LOWER LOBE OF RIGHT LUNG: Primary | ICD-10-CM

## 2025-01-24 LAB — COPPER SERPL-MCNC: 106 UG/DL (ref 69–132)

## 2025-02-03 ENCOUNTER — HOSPITAL ENCOUNTER (OUTPATIENT)
Dept: MRI IMAGING | Facility: HOSPITAL | Age: 75
Discharge: HOME | End: 2025-02-03
Payer: MEDICARE

## 2025-02-03 VITALS — OXYGEN SATURATION: 98 % | HEART RATE: 75 BPM | SYSTOLIC BLOOD PRESSURE: 154 MMHG | DIASTOLIC BLOOD PRESSURE: 91 MMHG

## 2025-02-03 DIAGNOSIS — R51.9 CHRONIC NONINTRACTABLE HEADACHE, UNSPECIFIED HEADACHE TYPE: ICD-10-CM

## 2025-02-03 DIAGNOSIS — G89.29 CHRONIC NONINTRACTABLE HEADACHE, UNSPECIFIED HEADACHE TYPE: ICD-10-CM

## 2025-02-03 DIAGNOSIS — M54.2 NECK PAIN: ICD-10-CM

## 2025-02-03 DIAGNOSIS — C34.31 MALIGNANT NEOPLASM OF LOWER LOBE OF RIGHT LUNG: ICD-10-CM

## 2025-02-03 PROCEDURE — 72156 MRI NECK SPINE W/O & W/DYE: CPT

## 2025-02-03 PROCEDURE — 25510000002 GADOBENATE DIMEGLUMINE 529 MG/ML SOLUTION: Performed by: INTERNAL MEDICINE

## 2025-02-03 PROCEDURE — A9577 INJ MULTIHANCE: HCPCS | Performed by: INTERNAL MEDICINE

## 2025-02-03 PROCEDURE — 70553 MRI BRAIN STEM W/O & W/DYE: CPT

## 2025-02-03 RX ADMIN — GADOBENATE DIMEGLUMINE 15 ML: 529 INJECTION, SOLUTION INTRAVENOUS at 09:39

## 2025-02-04 DIAGNOSIS — C34.31 MALIGNANT NEOPLASM OF LOWER LOBE OF RIGHT LUNG: Primary | ICD-10-CM

## 2025-02-11 DIAGNOSIS — R91.1 NODULE OF LOWER LOBE OF RIGHT LUNG: ICD-10-CM

## 2025-02-25 ENCOUNTER — OFFICE VISIT (OUTPATIENT)
Dept: CARDIOLOGY | Facility: CLINIC | Age: 75
End: 2025-02-25
Payer: MEDICARE

## 2025-02-25 VITALS
SYSTOLIC BLOOD PRESSURE: 104 MMHG | HEART RATE: 77 BPM | OXYGEN SATURATION: 96 % | HEIGHT: 72 IN | DIASTOLIC BLOOD PRESSURE: 54 MMHG | BODY MASS INDEX: 26.47 KG/M2 | WEIGHT: 195.4 LBS

## 2025-02-25 DIAGNOSIS — Z95.0 PRESENCE OF CARDIAC PACEMAKER: Primary | Chronic | ICD-10-CM

## 2025-02-25 DIAGNOSIS — I49.3 PVC'S (PREMATURE VENTRICULAR CONTRACTIONS): Chronic | ICD-10-CM

## 2025-02-25 DIAGNOSIS — E78.2 MIXED HYPERLIPIDEMIA: Chronic | ICD-10-CM

## 2025-02-25 DIAGNOSIS — I95.9 HYPOTENSION, UNSPECIFIED HYPOTENSION TYPE: Chronic | ICD-10-CM

## 2025-02-25 DIAGNOSIS — I25.10 CORONARY ARTERY DISEASE INVOLVING NATIVE CORONARY ARTERY OF NATIVE HEART WITHOUT ANGINA PECTORIS: Chronic | ICD-10-CM

## 2025-02-25 PROCEDURE — 3074F SYST BP LT 130 MM HG: CPT | Performed by: INTERNAL MEDICINE

## 2025-02-25 PROCEDURE — 99215 OFFICE O/P EST HI 40 MIN: CPT | Performed by: INTERNAL MEDICINE

## 2025-02-25 PROCEDURE — 3078F DIAST BP <80 MM HG: CPT | Performed by: INTERNAL MEDICINE

## 2025-02-25 PROCEDURE — 93000 ELECTROCARDIOGRAM COMPLETE: CPT | Performed by: INTERNAL MEDICINE

## 2025-02-25 RX ORDER — ROSUVASTATIN CALCIUM 40 MG/1
40 TABLET, COATED ORAL DAILY
Qty: 30 TABLET | Refills: 2 | Status: SHIPPED | OUTPATIENT
Start: 2025-02-25 | End: 2025-05-26

## 2025-02-25 RX ORDER — ASPIRIN 81 MG/1
81 TABLET ORAL DAILY
Qty: 90 TABLET | Refills: 3 | Status: SHIPPED | OUTPATIENT
Start: 2025-02-25 | End: 2026-02-20

## 2025-02-25 RX ORDER — MEGESTROL ACETATE 20 MG/1
1 TABLET ORAL DAILY
COMMUNITY
Start: 2024-11-26

## 2025-02-25 RX ORDER — LISINOPRIL 2.5 MG/1
2.5 TABLET ORAL DAILY
COMMUNITY
End: 2025-02-25

## 2025-02-25 NOTE — ASSESSMENT & PLAN NOTE
Tolerating statin without myalgias.  LDL in 2024 was 94 which is technically not at goal.  Should be less than 70.  Switch pravastatin to rosuvastatin 40.  Lipids in 6 to 8 weeks.

## 2025-02-25 NOTE — ASSESSMENT & PLAN NOTE
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  Does not use midodrine.  Advised stopping lisinopril and will continue fludrocortisone alone.  Use midodrine sparingly.  Needs regular labs with primary care

## 2025-02-25 NOTE — PROGRESS NOTES
"    [facility] Cardiology Office Follow Up Note    David Barfield  [id number]  2025    Primary Care Provider: Hyaley Castellanos APRN    Chief Complaint   Patient presents with    Chronic hypotension       Subjective     History of Present Illness:   David Barfield is a 74 y.o. male with orthostatic hypotension,  sick sinus syndrome status post permanent pacemaker, history of PVCs, dilated aortic root/thoracic aortic aneurysm, coronary artery calcifications and ongoing tobacco use who presents to the Cardiology Clinic for regular follow-up.  Previously seen by Dr. Box.  He has no acute complaints.  Says he remains relatively active but does not really overexert himself.  Does not have any chest pain or dyspnea with his current level of activity.  No orthopnea, PND, or leg swelling.  Unfortunately, continues to smoke.  He does have a history of orthostasis which has been attributed to immunotherapy that \"burned out his adrenal glands.\"  He has been on fludrocortisone for a long period of time and has midodrine on hand but does not really use that at all.  He is on a very low-dose of lisinopril.    Past Cardiac History and Testin. 1. Sick sinus syndrome  -- Exercise MPS 20, OSH: No perfusion defect, EF 53%  -- Occasional asymptomatic junctional rhythm per previous cardiologist note  -- Zio patch 20, 13 days, OSH: min 31, max 152, avg 56. No pauses greater than 2 seconds. Second degree type 1 AV. < 1% PAC burden. 8.2% PVC burden.   -- 2 week monitor --21: (56). SR, junctional, PVCs, PACs, isorhythmic dissociation and 4.7 sec pause at 9:30 am  -- Echo 21: EF 55%, mild LVH, trace MR, trace TR.   -- PPM implantation, 2021: [employer] 3D Accolade MRI  model L3 1 1 serial #[id number]  2. Premature ventricular contractions  -- Zio patch 20: 8.2% PVC burden  -- Echo 2024: EF 61-65%, mild LVH, mild AI, mild TR and mild MR  3. Thoracic " aneurysm  -- CT chest 1/4/19, OSH: aortic root dilation, 4.2 cm.   -- CT chest 7/15/19, OSH: aortic root dilation, 4.2 cm.   -- CT chest 1/22/20, OSH: dilated aortic root 4.2 cm.   -- CT chest 5/6/2020, OSH: dilated aortic root, 4.2 cm  -- Aortic root on echo 4/27/2024 measuring 4.0 cm  4. Coronary calcifications  5. Tobacco abuse of 1 pack/day, ongoing as of 5/18/2022  6. Lung nodules  7. Hyperlipidemia  8.  Orthostatic hypotension on fludrocortisone and as needed midodrine.      Review of Systems:  Review of Systems   Constitutional: Negative.    Respiratory: Negative.     Cardiovascular: Negative.    Gastrointestinal: Negative.    Musculoskeletal: Negative.    Neurological: Negative.        I have reviewed and/or updated the patient's past medical, past surgical, family, social history, problem list and allergies as appropriate.       Current Outpatient Medications:     albuterol sulfate  (90 Base) MCG/ACT inhaler, Inhale 2 puffs Every 4 (Four) Hours As Needed for Wheezing., Disp: 18 g, Rfl: 11    Ascorbic Acid (VITAMIN C PO), Take  by mouth., Disp: , Rfl:     B Complex Vitamins (vitamin b complex) capsule capsule, Take 1 capsule by mouth Daily. OTC, Disp: , Rfl:     ferrous sulfate 325 (65 FE) MG tablet, Take 1 tablet by mouth Daily With Breakfast. OTC, Disp: , Rfl:     fludrocortisone 0.1 MG tablet, Take 1 tablet by mouth Daily., Disp: 90 tablet, Rfl: 1    Fluticasone-Umeclidin-Vilant (Trelegy Ellipta) 100-62.5-25 MCG/ACT inhaler, Inhale 1 puff Daily., Disp: 3 each, Rfl: 3    HYDROcodone-acetaminophen (Norco) 7.5-325 MG per tablet, Take 1 tablet by mouth Every 6 (Six) Hours As Needed for Moderate Pain., Disp: 60 tablet, Rfl: 0    magnesium chloride ER 64 MG DR tablet, Take 1 tablet by mouth Daily., Disp: , Rfl:     megestrol (MEGACE) 20 MG tablet, Take 1 tablet by mouth Daily., Disp: , Rfl:     midodrine (PROAMATINE) 10 MG tablet, Take 1 tablet by mouth 3 (Three) Times a Day As Needed (Systolic BP less  "then 100)., Disp: 30 tablet, Rfl: 0    Probiotic Product (PROBIOTIC BLEND PO), Take  by mouth., Disp: , Rfl:     sodium bicarbonate 650 MG tablet, Take 2 tablets by mouth 3 times a day., Disp: , Rfl:     VITAMIN A OP, Apply  to eye(s) as directed by provider., Disp: , Rfl:     VITAMIN D PO, Take  by mouth., Disp: , Rfl:     aspirin 81 MG EC tablet, Take 1 tablet by mouth Daily for 360 days., Disp: 90 tablet, Rfl: 3    fluticasone (VERAMYST) 27.5 MCG/SPRAY nasal spray, 2 sprays into the nostril(s) as directed by provider 1 (One) Time As Needed for Rhinitis or Allergies. (Patient not taking: Reported on 2/25/2025), Disp: 6 mL, Rfl: 2    lidocaine-prilocaine (EMLA) 2.5-2.5 % cream, Apply 1 application  topically to the appropriate area as directed As Needed (45-60 minutes prior to port access.  Cover with saran/plastic wrap.). (Patient not taking: Reported on 2/25/2025), Disp: 30 g, Rfl: 3    omeprazole (priLOSEC) 40 MG capsule, Take 1 capsule by mouth Daily. (Patient not taking: Reported on 2/25/2025), Disp: 30 capsule, Rfl: 5    ondansetron (ZOFRAN) 4 MG tablet, , Disp: , Rfl:     ondansetron (ZOFRAN) 8 MG tablet, Take 1 tablet by mouth 3 (Three) Times a Day As Needed for Nausea or Vomiting. (Patient not taking: Reported on 2/25/2025), Disp: 30 tablet, Rfl: 2    rosuvastatin (CRESTOR) 40 MG tablet, Take 1 tablet by mouth Daily for 90 days., Disp: 30 tablet, Rfl: 2       Objective     Vitals:  Vitals:    02/25/25 1352   BP: 104/54   BP Location: Right arm   Patient Position: Sitting   Cuff Size: Adult   Pulse: 77   SpO2: 96%   Weight: 88.6 kg (195 lb 6.4 oz)   Height: 182.9 cm (72.01\")       Physical Exam:  Vitals reviewed.   Constitutional:       Appearance: Healthy appearance. Not in distress.   Neck:      Vascular: No JVD.   Pulmonary:      Effort: Pulmonary effort is normal.      Breath sounds: Normal breath sounds.   Cardiovascular:      Normal rate. Regular rhythm. Normal S1. Normal S2.       Murmurs: There is " no murmur.      No gallop.  No click. No rub.   Pulses:     Intact distal pulses.   Edema:     Peripheral edema absent.   Abdominal:      General: There is no distension.      Palpations: Abdomen is soft.      Tenderness: There is no abdominal tenderness.   Skin:     General: Skin is warm and dry.         Results Review:   I reviewed the patient's new clinical results.  I reviewed the patient's new imaging results and agree with the interpretation.  I reviewed the patient's other test results and agree with the interpretation  I personally viewed and interpreted the patient's EKG/Telemetry data      ECG 12 Lead    Date/Time: 2/25/2025 5:00 PM  Performed by: Ceasar Sanchez MD    Authorized by: Ceasar Sanchez MD  Comparison: compared with previous ECG from 5/25/2024  Comparison to previous ECG: Nonspecific ST abnormalities present now  Rate: normal  BPM: 90  Conduction: conduction normal  Q waves: III and aVF (Inferior infarct, age undetermined)    QRS axis: normal  Other findings: non-specific ST-T wave changes  Pacing capture: Atrial paced rhythm.  Clinical impression: normal ECG          Most Recent Labs:  TSH (01/21/2025 12:55)  CBC & Differential (01/23/2025 13:32)  Comprehensive Metabolic Panel (01/23/2025 13:32)          Assessment & Plan  Presence of cardiac pacemaker  [employer] pacemaker interrogated today.  Normal function.  1 run of what appears to be SVT.  Continue regular pacer checks       Coronary artery disease involving native coronary artery of native heart without angina pectoris  Has known coronary calcifications but no history of MI.  No recent stress test.  Baseline ECG today shows some nonspecific ST abnormalities that are new compared to his previous tracing from last year.  In the absence of symptoms, no strong indication for functional testing at this time, although should be considered if he does develop new exertional symptoms.  Will change his statin to high intensity  rosuvastatin 40  Continue aspirin 81 mg daily    Orders:    rosuvastatin (CRESTOR) 40 MG tablet; Take 1 tablet by mouth Daily for 90 days.    Lipid Panel; Future    aspirin 81 MG EC tablet; Take 1 tablet by mouth Daily for 360 days.    Mixed hyperlipidemia  Tolerating statin without myalgias.  LDL in 2024 was 94 which is technically not at goal.  Should be less than 70.  Switch pravastatin to rosuvastatin 40.  Lipids in 6 to 8 weeks.         PVC's (premature ventricular contractions)  Stable, asymptomatic.  Not on a beta-blocker.         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  Does not use midodrine.  Advised stopping lisinopril and will continue fludrocortisone alone.  Use midodrine sparingly.  Needs regular labs with primary care              Plan   Preventative Cardiology:   Tobacco Cessation: N/A  Obstructive Sleep Apnea Screening: Deffered  AAA Screening: Deffered  Peripheral Arterial Disease Screening: Deffered    Advanced Care Planning  ACP discussion was held with the patient during this visit. Patient has an advance directive in EMR which is still valid.            Follow Up:   Return in about 6 months (around 8/25/2025).    I spent 41 minutes evaluating, treating, counseling, coordinating patient care, reviewing old/outside records, and documenting. This excludes time spent on separately billable services and procedures.      Thank you for allowing me to participate in the care of your patient. Please to not hesitate to contact me with additional questions or concerns.     Electronically signed by Ceasar Sanchez MD, 02/25/25, 5:16 PM EST.

## 2025-02-25 NOTE — ASSESSMENT & PLAN NOTE
Has known coronary calcifications but no history of MI.  No recent stress test.  Baseline ECG today shows some nonspecific ST abnormalities that are new compared to his previous tracing from last year.  In the absence of symptoms, no strong indication for functional testing at this time, although should be considered if he does develop new exertional symptoms.  Will change his statin to high intensity rosuvastatin 40  Continue aspirin 81 mg daily    Orders:    rosuvastatin (CRESTOR) 40 MG tablet; Take 1 tablet by mouth Daily for 90 days.    Lipid Panel; Future    aspirin 81 MG EC tablet; Take 1 tablet by mouth Daily for 360 days.

## 2025-02-25 NOTE — ASSESSMENT & PLAN NOTE
[employer] pacemaker interrogated today.  Normal function.  1 run of what appears to be SVT.  Continue regular pacer checks

## 2025-02-26 ENCOUNTER — TELEPHONE (OUTPATIENT)
Dept: ONCOLOGY | Facility: CLINIC | Age: 75
End: 2025-02-26

## 2025-02-26 NOTE — TELEPHONE ENCOUNTER
Caller: ISAÍAS ANDERSON - FRIEND    Relationship to patient: Emergency Contact    Best call back number: [phone]    Chief complaint: CANC. - R/S     Type of visit: PORT FLUSH     Requested date: 3/5/25 AT 2:30     If rescheduling, when is the original appointment: 3/4/25

## 2025-03-05 ENCOUNTER — OFFICE VISIT (OUTPATIENT)
Dept: PULMONOLOGY | Facility: CLINIC | Age: 75
End: 2025-03-05
Payer: MEDICARE

## 2025-03-05 VITALS
RESPIRATION RATE: 16 BRPM | BODY MASS INDEX: 26.15 KG/M2 | SYSTOLIC BLOOD PRESSURE: 166 MMHG | OXYGEN SATURATION: 99 % | WEIGHT: 193.06 LBS | DIASTOLIC BLOOD PRESSURE: 102 MMHG | HEIGHT: 72 IN | TEMPERATURE: 98.2 F | HEART RATE: 70 BPM

## 2025-03-05 DIAGNOSIS — J43.2 CENTRILOBULAR EMPHYSEMA: ICD-10-CM

## 2025-03-05 DIAGNOSIS — Z72.0 TOBACCO ABUSE: ICD-10-CM

## 2025-03-05 DIAGNOSIS — C34.91 BRONCHOGENIC CANCER OF RIGHT LUNG: Primary | ICD-10-CM

## 2025-03-05 PROCEDURE — 3080F DIAST BP >= 90 MM HG: CPT | Performed by: INTERNAL MEDICINE

## 2025-03-05 PROCEDURE — 99214 OFFICE O/P EST MOD 30 MIN: CPT | Performed by: INTERNAL MEDICINE

## 2025-03-05 PROCEDURE — 3077F SYST BP >= 140 MM HG: CPT | Performed by: INTERNAL MEDICINE

## 2025-03-05 NOTE — PROGRESS NOTES
Pulmonary Consult Note    Subjective   Reason for consultation: Lung nodule    David Barfield is a 75 y.o. male is being seen for consultation today at the request of No ref. provider found    History of Present Illness    75 y.o. male active smoker of 1/2 PPD up to 2 PPD for 50 years with a history of HTN, HL, here for evaluation of a right sided lung nodule.  Had bronchoscopy/EBUS in 2020 at the [facility] that was reportedly benign.  He worked in construction up to age 71.  Had PPM in 2021 by Dr. Box and is followed by Dr. Pop.      Patient has an appointment at the [facility] on September 13th for a procedure but patient/family do not want it done there.      Patient denies dyspnea, hemoptysis, unexplained weight loss, or palpable adenopathy.  He had an episode a few months ago during which time he had worsening dyspnea.  He does not take current inhalers.      Subsequent history:    I performed a bronchoscopy with EBUS on 9/15/2023.  Results from biopsies showed a nondiagnostic sample from the right lower lobe however the station 7 lymph node was positive for non-small cell carcinoma.  Patient has undergone a right lower lobe lobectomy by Dr. Patel on 1/9/2024.  He received neoadjuvant chemotherapy/immunotherapy under the direction of Dr. Ashley.  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  No evidence of malignancy in the lymph nodes.  He is doing quite well postoperatively.  He is following with Dr. Ashley with plans for immunotherapy going forward.    Interval history:  Patient is here for follow up.  He has gained weight back.  He continues on Trelegy.  Uses rescue inhaler infrequently.  Had recent PET/and CT chest.  Still smoking.     The following portions of the patient's history were reviewed and updated as appropriate: allergies, current medications, past family history, past medical history, past social history, past surgical history, and problem list.    Active  Ambulatory Problems     Diagnosis Date Noted    High degree atrioventricular block 06/30/2021    Bradycardia, sinus 06/30/2021    Chronic hypotension 06/30/2021    PVC's (premature ventricular contractions) 06/30/2021    Presence of cardiac pacemaker 07/05/2023    Mixed hyperlipidemia 07/05/2023    Centrilobular emphysema 08/29/2023    Nodule of lower lobe of right lung 08/29/2023    Mediastinal adenopathy 08/29/2023    Cough 08/29/2023    Tobacco abuse 08/29/2023    Bronchogenic cancer of right lung 10/04/2023    Malignant neoplasm of lower lobe of right lung 10/04/2023    Primary hypertension 01/11/2024    GERD without esophagitis 01/11/2024    Septic shock 04/12/2024    Acute pyelonephritis 04/12/2024    ARACELI (acute kidney injury) 04/13/2024    Hypokalemia 04/13/2024    Severe malnutrition 04/15/2024    Leukocytosis 04/24/2024    CAD (coronary artery disease) 05/06/2024    Hypotension 05/14/2024    Stage 4 chronic kidney disease 05/14/2024    Moderate malnutrition 05/17/2024    Weakness 05/20/2024    Hypotension 05/25/2024    Dehydration 05/25/2024    C. difficile colitis 05/25/2024    UTI (urinary tract infection) 05/25/2024    Hx of hypotension 05/25/2024     Resolved Ambulatory Problems     Diagnosis Date Noted    Lung cancer 01/09/2024     Past Medical History:   Diagnosis Date    Abnormal ECG     Arrhythmia 2019    Asthma 2019    Coronary artery disease 2019    Diabetes mellitus Borderline    Emphysema/COPD     Erectile disorder     GERD (gastroesophageal reflux disease)     History of chemotherapy     Hyperlipidemia     Hypertension 2019    Lung nodule     Mumps     Mumps     Pruritus     Slow to wake up after anesthesia     Stage 5 chronic kidney disease not on chronic dialysis 05/14/2024    Wears dentures     Wears hearing aid in both ears        Past Surgical History:   Procedure Laterality Date    BONE BIOPSY      broken bone surgery in his face    BRONCHOSCOPY THORACOTOMY Right 01/09/2024    Procedure:  "THORACOTOMY FOR LOWER LOBECTOMY AND MEDISTINAL LYMPH NODE DISSECTION RIGHT;  Surgeon: Joey Patel MD;  Location:  [facility];  Service: Cardiothoracic;  Laterality: Right;    BRONCHOSCOPY WITH ION ROBOTIC ASSIST N/A 09/15/2023    Procedure: BRONCHOSCOPY NAVIGATION WITH ENDOBRONCHIAL ULTRASOUND AND ION ROBOT;  Surgeon: Octaviano Smapson MD;  Location:  [facility];  Service: Robotics - Pulmonary;  Laterality: N/A;  ion #6 - [id number]  - 0015  Cath guide 0061    EBUS balloon removed and intact    CARDIAC ELECTROPHYSIOLOGY PROCEDURE N/A 08/17/2021    Procedure: Pacemaker DC new;  Surgeon: Kayy Box MD;  Location:  [facility];  Service: Cardiology;  Laterality: N/A;    FACIAL FRACTURE SURGERY      LYMPH NODE BIOPSY  2023    PACEMAKER IMPLANTATION         Family History   Problem Relation Age of Onset    Aneurysm Mother         brain    Dementia Father     Leukemia Sister     Cancer Sister     Heart disease Paternal Grandmother     Hypertension Paternal Grandfather     Breast cancer Neg Hx     Ovarian cancer Neg Hx        Social History     Socioeconomic History    Marital status: Single    Number of children: 3   Tobacco Use    Smoking status: Every Day     Current packs/day: 0.50     Average packs/day: 0.5 packs/day for 57.2 years (29.1 ttl pk-yrs)     Types: Cigarettes     Start date: 1/1/1968     Passive exposure: Current    Smokeless tobacco: Never    Tobacco comments:     Still smoke   Vaping Use    Vaping status: Never Used   Substance and Sexual Activity    Alcohol use: Never    Drug use: Never    Sexual activity: Yes     Partners: Female     Birth control/protection: None       Allergies   Allergen Reactions    Cymbalta [Duloxetine Hcl] GI Intolerance    Gabapentin Mental Status Change     Pt states that this medication \"makes him feel foolish in his head\".     Remeron [Mirtazapine] Other (See Comments)     Excess sedation    Toradol [Ketorolac Tromethamine] GI Intolerance "     Projectile vomiting     Latex Other (See Comments)     Latex allergy     Tape Rash       Current Outpatient Medications   Medication Sig Dispense Refill    albuterol sulfate  (90 Base) MCG/ACT inhaler Inhale 2 puffs Every 4 (Four) Hours As Needed for Wheezing. 18 g 11    Ascorbic Acid (VITAMIN C PO) Take  by mouth.      aspirin 81 MG EC tablet Take 1 tablet by mouth Daily for 360 days. 90 tablet 3    B Complex Vitamins (vitamin b complex) capsule capsule Take 1 capsule by mouth Daily. OTC      ferrous sulfate 325 (65 FE) MG tablet Take 1 tablet by mouth Daily With Breakfast. OTC      fludrocortisone 0.1 MG tablet Take 1 tablet by mouth Daily. 90 tablet 1    fluticasone (VERAMYST) 27.5 MCG/SPRAY nasal spray 2 sprays into the nostril(s) as directed by provider 1 (One) Time As Needed for Rhinitis or Allergies. 6 mL 2    Fluticasone-Umeclidin-Vilant (Trelegy Ellipta) 100-62.5-25 MCG/ACT inhaler Inhale 1 puff Daily. 3 each 3    HYDROcodone-acetaminophen (Norco) 7.5-325 MG per tablet Take 1 tablet by mouth Every 6 (Six) Hours As Needed for Moderate Pain. 60 tablet 0    lidocaine-prilocaine (EMLA) 2.5-2.5 % cream Apply 1 application  topically to the appropriate area as directed As Needed (45-60 minutes prior to port access.  Cover with saran/plastic wrap.). 30 g 3    magnesium chloride ER 64 MG DR tablet Take 1 tablet by mouth Daily.      megestrol (MEGACE) 20 MG tablet Take 1 tablet by mouth Daily.      midodrine (PROAMATINE) 10 MG tablet Take 1 tablet by mouth 3 (Three) Times a Day As Needed (Systolic BP less then 100). 30 tablet 0    omeprazole (priLOSEC) 40 MG capsule Take 1 capsule by mouth Daily. 30 capsule 5    ondansetron (ZOFRAN) 4 MG tablet       ondansetron (ZOFRAN) 8 MG tablet Take 1 tablet by mouth 3 (Three) Times a Day As Needed for Nausea or Vomiting. 30 tablet 2    Probiotic Product (PROBIOTIC BLEND PO) Take  by mouth.      rosuvastatin (CRESTOR) 40 MG tablet Take 1 tablet by mouth Daily for 90  "days. 30 tablet 2    sodium bicarbonate 650 MG tablet Take 2 tablets by mouth 3 times a day.      VITAMIN A OP Apply  to eye(s) as directed by provider.      VITAMIN D PO Take  by mouth.       No current facility-administered medications for this visit.       Review of Systems  All other systems were reviewed and are negative.  Exceptions are included in the HPI or as otherwise noted above.     Objective   Blood pressure (!) 166/102, pulse 70, temperature 98.2 °F (36.8 °C), resp. rate 16, height 182.9 cm (72.01\"), weight 87.6 kg (193 lb 1 oz), SpO2 99%.  Physical Exam    Physical Exam:     Constitutional:    Alert, cooperative, in no acute distress   Head:    Normocephalic, without obvious abnormality, atraumatic   Eyes:            Lids and lashes normal, conjunctivae and sclerae normal, no   icterus, no pallor, corneas clear, PER   ENMT:   Ears appear intact with no abnormalities noted        Neck:   Trachea midline, no thyromegaly, no JVD       Lungs/Resp:     Normal effort, symmetric chest rise, no crepitus, clear bilaterally, no chest wall tenderness                 Heart/CV:    Regular rhythm and normal rate, normal S1 and S2, no            murmur   Abdomen/GI:   Nondistended, normal bowel sounds   :     Deferred   Extremities/MSK:   No clubbing or cyanosis.  No edema.  Normal tone.  No deformities.    Pulses:   Pulses palpable and equal bilaterally   Skin:   No bleeding, bruising or rash   Heme/Lymph:   No cervical or supraclavicular adenopathy.    Neurologic:    Psychiatric:       Moves all extremities with no obvious focal motor deficit.  Cranial nerves 2 - 12 grossly intact   Oriented x 3, normal affect.          The above findings are documentation of my personal physical examination from today.  Electronically signed by:  Octaviano Sampson MD  03/05/25  20:43 EST      PFTs:  Full pulmonary function testing was done on 8/19/2024.  Please see scanned PFT report for complete details.  FVC was 3.39 L or 78% " predicted.  FEV1 was 2.38 L or 74% predicted.  FEV1 to FVC ratio 70%.  Excellent evaluation 79 L/min or 67% predicted.  Total lung capacity 5.92 L or 78% predicted.  Residual volume 4 5 3 L or 89% predicted.  DLCO 58% predicted.  DLCO/VA 69% predicted.    Interpretation: No obstruction.  Mild restriction.  Low maximal voluntary ventilation, which may be effort related.  No air trapping or hyperinflation.  Low DLCO.  Compared to prior study on 8/29/2023, FEV1 is decreased by 350 mL.    Full pulmonary function testing was done on 8/29/2023.  Please see scanned PFT report for complete details.  FVC was 3.62 L or 78% predicted.  FEV1 was 2.43 L or 72% predicted.  FEV1 FVC ratio 67%.  Postbronchodilator FEV1 2.78 L or 83% predicted, a 14% increase from prebronchodilator.  Maximal voluntary ventilation was 89 L/min or 69% predicted.  Total lung capacity was 6.45 L or 93% predicted.  Residual volume 2.74 L or 100% predicted.  DLCO 91% predicted.    Interpretation: Moderate obstruction with significant response to bronchodilator.  Low maximal voluntary ventilation.  No restriction.  No air trapping or hyperinflation.  Normal DLCO.  No prior study available for comparison.  Study is consistent with stage II COPD with asthmatic component.  I suspect there is some hilar adenopathy.  There is evidence of prior granulomatous disease.    Imaging: I reviewed both the images and radiologist's report.    I reviewed the patient's CT scan of the chest from 2/10/2025.  Compared to prior CT scan there has been no significant change.  I also reviewed his most recent PET scan from January 16, 2025.  There was minimal uptake in multiple mediastinal lymph nodes in a symmetric type pattern.    PET CT scan from 6/20/2024 was reviewed.  I reviewed both the images and the radiologist's report.  There were some borderline metabolically active mediastinal and supraclavicular lymph nodes.  Radiologist's impression follows:    Impression:  Similar  appearance of some mildly FDG avid, nonenlarged mediastinal and hilar nodes and right supraclavicular node. This is somewhat nonspecific. Otherwise no findings to suggest metastatic disease.    Chest x-ray 2/2/2024 reviewed and shows a right-sided pacemaker in place with postsurgical changes to the chest from right lower lobectomy including stable small right-sided pleural effusion and basilar scarring.    I reviewed again prior CT scans of the chest from August 10, 2023, July 19, 2022, July 20, 2021, January 22, 2020, July 15, 2019, and January 4, 2019.  Patient has a cavitary right lower lobe superior segment lung nodule that appears to have grown from the most recent CT scan and become more dense.  Looking back on prior CT scans this area has had varying densities and appears to have an underlying cystic structure.    Assessment & Plan   Problems Addressed this Visit          Hematology and Neoplasia    Bronchogenic cancer of right lung - Primary       Pulmonary and Pneumonias    Centrilobular emphysema       Tobacco    Tobacco abuse     Diagnoses         Codes Comments    Bronchogenic cancer of right lung    -  Primary ICD-10-CM: C34.91  ICD-9-CM: 162.9     Centrilobular emphysema     ICD-10-CM: J43.2  ICD-9-CM: 492.8     Tobacco abuse     ICD-10-CM: Z72.0  ICD-9-CM: 305.1             Discussion:    75 y.o. male active smoker of 1/2 PPD up to 2 PPD for 50 years with a history of HTN, HL, here for follow-up evaluation of a right sided lung nodule.  Had bronchoscopy/EBUS in 2020 at the [facility] that was reportedly benign.  He worked in construction up to age 71.  Had PPM in 2021 by Dr. Box and is followed by Dr. Pop.      I performed a bronchoscopy with EBUS on 9/15/2023.  Results from biopsies showed a nondiagnostic sample from the right lower lobe however the station 7 lymph node was positive for non-small cell carcinoma.  Patient has undergone a right lower lobe lobectomy by Dr. Patel on 1/9/2024.   He received neoadjuvant chemotherapy/immunotherapy under the direction of Dr. Ashley.  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  No evidence of malignancy in the lymph nodes.  He has done quite well postoperatively.  Currently off of immunotherapy secondary to reaction.  Follow-up Dr. Ashley with a plan for upcoming PET/CT and MRI brain.    Plan:  1.  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  Subsequently underwent neoadjuvant chemoimmunotherapy followed by right lower lobectomy by Dr. Patel 1/9/2024.  Follows with Dr. Ashley from oncology.  Received immunotherapy but currently on nivolumab.  2.  For mediastinal adenopathy: Status post EBUS with station 7 positive for non-small cell carcinoma.  Negative path on resection after neoadjuvant chemoimmunotherapy.  Repeat PET CT pending.  Followed by Dr. Ashley.  3.  For COPD with asthmatic component: Continue Trelegy 100.  Albuterol rescue inhaler 2 puffs every 4-6 hours as needed.  4.  For history of tobacco use presenting hazards to health: Continue imaging follow-up (per oncology at this point).  Unfortunately patient has started smoking again.  Counseled him again on smoking cessation.  5.  For health maintenance: Recommend yearly influenza vaccination.  Recommend pneumonia vaccination.  Patient currently up-to-date on pneumonia/influenza.  Has had RSV vaccination.      F/u 6 months with spirometry.        Immunization History   Administered Date(s) Administered    ABRYSVO (RSV, 60+ or pregnant women 32-36 wks) 10/16/2023    COVID-19 (PFIZER) 12YRS+ (COMIRNATY) 09/26/2023, 09/18/2024    COVID-19 (PFIZER) BIVALENT 12+YRS 09/16/2022    COVID-19 (PFIZER) Purple Cap Monovalent 01/29/2021, 02/19/2021, 10/18/2021, 04/14/2022    Fluzone High-Dose 65+YRS 09/24/2024    Fluzone High-Dose 65+yrs 10/06/2023    Pneumococcal  Conjugate 20-Valent (PCV20) 10/11/2023       Social History     Tobacco Use   Smoking Status Every Day    Current packs/day: 0.50    Average packs/day: 0.5 packs/day for 57.2 years (29.1 ttl pk-yrs)    Types: Cigarettes    Start date: 1/1/1968    Passive exposure: Current   Smokeless Tobacco Never   Tobacco Comments    Still smoke        David Barfield  reports that he has been smoking cigarettes. He started smoking about 57 years ago. He has a 29.1 pack-year smoking history. He has been exposed to tobacco smoke. He has never used smokeless tobacco..             Advance Care Planning   ACP discussion was held with the patient during this visit. Patient has an advance directive in EMR which is still valid.  [place] MOST form given 8/29/2023.        MDM:    Problem(s) Moderate due to: 2 or more stable chronic illnesses  Risk: Moderate due to: prescription drug management    Moderate      Electronically signed by:  Octaviano Sampson MD  03/05/25  20:43 EST    Please note that portions of this note were completed with [employer] - a voice recognition program.

## 2025-03-07 ENCOUNTER — TRANSCRIBE ORDERS (OUTPATIENT)
Dept: LAB | Facility: HOSPITAL | Age: 75
End: 2025-03-07
Payer: MEDICARE

## 2025-03-07 DIAGNOSIS — N17.9 ACUTE RENAL FAILURE, UNSPECIFIED ACUTE RENAL FAILURE TYPE: Primary | ICD-10-CM

## 2025-03-10 ENCOUNTER — HOSPITAL ENCOUNTER (OUTPATIENT)
Dept: ONCOLOGY | Facility: HOSPITAL | Age: 75
Discharge: HOME | End: 2025-03-10
Admitting: INTERNAL MEDICINE
Payer: MEDICARE

## 2025-03-10 VITALS
TEMPERATURE: 97.6 F | SYSTOLIC BLOOD PRESSURE: 141 MMHG | BODY MASS INDEX: 26.1 KG/M2 | DIASTOLIC BLOOD PRESSURE: 81 MMHG | WEIGHT: 192.7 LBS | RESPIRATION RATE: 16 BRPM | HEART RATE: 70 BPM | HEIGHT: 72 IN

## 2025-03-10 DIAGNOSIS — C34.31 MALIGNANT NEOPLASM OF LOWER LOBE OF RIGHT LUNG: Primary | ICD-10-CM

## 2025-03-10 DIAGNOSIS — N17.9 ACUTE RENAL FAILURE, UNSPECIFIED ACUTE RENAL FAILURE TYPE: ICD-10-CM

## 2025-03-10 LAB
ALBUMIN SERPL-MCNC: 3.8 G/DL (ref 3.5–5.2)
ANION GAP SERPL CALCULATED.3IONS-SCNC: 13 MMOL/L (ref 5–15)
BACTERIA UR QL AUTO: ABNORMAL /HPF
BASOPHILS # BLD AUTO: 0.01 10*3/MM3 (ref 0–0.2)
BASOPHILS NFR BLD AUTO: 0.1 % (ref 0–1.5)
BILIRUB UR QL STRIP: NEGATIVE
BUN SERPL-MCNC: 12 MG/DL (ref 8–23)
BUN/CREAT SERPL: 8.8 (ref 7–25)
CALCIUM SPEC-SCNC: 9 MG/DL (ref 8.6–10.5)
CHLORIDE SERPL-SCNC: 104 MMOL/L (ref 98–107)
CLARITY UR: ABNORMAL
CO2 SERPL-SCNC: 23 MMOL/L (ref 22–29)
COLOR UR: YELLOW
CREAT SERPL-MCNC: 1.37 MG/DL (ref 0.76–1.27)
CREAT UR-MCNC: 89.8 MG/DL
DEPRECATED RDW RBC AUTO: 46 FL (ref 37–54)
EGFRCR SERPLBLD CKD-EPI 2021: 53.5 ML/MIN/1.73
EOSINOPHIL # BLD AUTO: 0.06 10*3/MM3 (ref 0–0.4)
EOSINOPHIL NFR BLD AUTO: 0.6 % (ref 0.3–6.2)
ERYTHROCYTE [DISTWIDTH] IN BLOOD BY AUTOMATED COUNT: 12.5 % (ref 12.3–15.4)
GLUCOSE SERPL-MCNC: 94 MG/DL (ref 65–99)
GLUCOSE UR STRIP-MCNC: NEGATIVE MG/DL
HCT VFR BLD AUTO: 36.1 % (ref 37.5–51)
HGB BLD-MCNC: 11.9 G/DL (ref 13–17.7)
HGB UR QL STRIP.AUTO: NEGATIVE
HYALINE CASTS UR QL AUTO: ABNORMAL /LPF
IMM GRANULOCYTES # BLD AUTO: 0.01 10*3/MM3 (ref 0–0.05)
IMM GRANULOCYTES NFR BLD AUTO: 0.1 % (ref 0–0.5)
KETONES UR QL STRIP: NEGATIVE
LEUKOCYTE ESTERASE UR QL STRIP.AUTO: NEGATIVE
LYMPHOCYTES # BLD AUTO: 2.07 10*3/MM3 (ref 0.7–3.1)
LYMPHOCYTES NFR BLD AUTO: 19.4 % (ref 19.6–45.3)
MCH RBC QN AUTO: 32.9 PG (ref 26.6–33)
MCHC RBC AUTO-ENTMCNC: 33 G/DL (ref 31.5–35.7)
MCV RBC AUTO: 99.7 FL (ref 79–97)
MONOCYTES # BLD AUTO: 0.75 10*3/MM3 (ref 0.1–0.9)
MONOCYTES NFR BLD AUTO: 7 % (ref 5–12)
NEUTROPHILS NFR BLD AUTO: 7.77 10*3/MM3 (ref 1.7–7)
NEUTROPHILS NFR BLD AUTO: 72.8 % (ref 42.7–76)
NITRITE UR QL STRIP: NEGATIVE
PH UR STRIP.AUTO: 8 [PH] (ref 5–8)
PHOSPHATE SERPL-MCNC: 3 MG/DL (ref 2.5–4.5)
PLATELET # BLD AUTO: 239 10*3/MM3 (ref 140–450)
PMV BLD AUTO: 8.3 FL (ref 6–12)
POTASSIUM SERPL-SCNC: 3.8 MMOL/L (ref 3.5–5.2)
PROT ?TM UR-MCNC: 12.4 MG/DL
PROT UR QL STRIP: NEGATIVE
RBC # BLD AUTO: 3.62 10*6/MM3 (ref 4.14–5.8)
RBC # UR STRIP: ABNORMAL /HPF
REF LAB TEST METHOD: ABNORMAL
SODIUM SERPL-SCNC: 140 MMOL/L (ref 136–145)
SP GR UR STRIP: 1.01 (ref 1–1.03)
SQUAMOUS #/AREA URNS HPF: ABNORMAL /HPF
UROBILINOGEN UR QL STRIP: ABNORMAL
WBC # UR STRIP: ABNORMAL /HPF
WBC NRBC COR # BLD AUTO: 10.67 10*3/MM3 (ref 3.4–10.8)

## 2025-03-10 PROCEDURE — 80069 RENAL FUNCTION PANEL: CPT | Performed by: STUDENT IN AN ORGANIZED HEALTH CARE EDUCATION/TRAINING PROGRAM

## 2025-03-10 PROCEDURE — 81001 URINALYSIS AUTO W/SCOPE: CPT | Performed by: STUDENT IN AN ORGANIZED HEALTH CARE EDUCATION/TRAINING PROGRAM

## 2025-03-10 PROCEDURE — 36591 DRAW BLOOD OFF VENOUS DEVICE: CPT

## 2025-03-10 PROCEDURE — 85025 COMPLETE CBC W/AUTO DIFF WBC: CPT | Performed by: STUDENT IN AN ORGANIZED HEALTH CARE EDUCATION/TRAINING PROGRAM

## 2025-03-10 PROCEDURE — 82570 ASSAY OF URINE CREATININE: CPT | Performed by: STUDENT IN AN ORGANIZED HEALTH CARE EDUCATION/TRAINING PROGRAM

## 2025-03-10 PROCEDURE — 25010000002 HEPARIN LOCK FLUSH PER 10 UNITS: Performed by: INTERNAL MEDICINE

## 2025-03-10 PROCEDURE — 84156 ASSAY OF PROTEIN URINE: CPT | Performed by: STUDENT IN AN ORGANIZED HEALTH CARE EDUCATION/TRAINING PROGRAM

## 2025-03-10 RX ORDER — SODIUM CHLORIDE 0.9 % (FLUSH) 0.9 %
10 SYRINGE (ML) INJECTION AS NEEDED
Status: DISCONTINUED | OUTPATIENT
Start: 2025-03-10 | End: 2025-03-11 | Stop reason: HOSPADM

## 2025-03-10 RX ORDER — HEPARIN SODIUM (PORCINE) LOCK FLUSH IV SOLN 100 UNIT/ML 100 UNIT/ML
500 SOLUTION INTRAVENOUS AS NEEDED
Status: DISCONTINUED | OUTPATIENT
Start: 2025-03-10 | End: 2025-03-11 | Stop reason: HOSPADM

## 2025-03-10 RX ADMIN — Medication 10 ML: at 13:39

## 2025-03-10 RX ADMIN — HEPARIN 500 UNITS: 100 SYRINGE at 13:39

## 2025-04-09 ENCOUNTER — TELEPHONE (OUTPATIENT)
Dept: CARDIOLOGY | Facility: CLINIC | Age: 75
End: 2025-04-09

## 2025-04-18 ENCOUNTER — HOSPITAL ENCOUNTER (OUTPATIENT)
Dept: PET IMAGING | Facility: HOSPITAL | Age: 75
Discharge: HOME | End: 2025-04-18
Payer: MEDICARE

## 2025-04-18 DIAGNOSIS — G89.29 CHRONIC NONINTRACTABLE HEADACHE, UNSPECIFIED HEADACHE TYPE: ICD-10-CM

## 2025-04-18 DIAGNOSIS — R51.9 CHRONIC NONINTRACTABLE HEADACHE, UNSPECIFIED HEADACHE TYPE: ICD-10-CM

## 2025-04-18 DIAGNOSIS — M54.2 NECK PAIN: ICD-10-CM

## 2025-04-18 DIAGNOSIS — C34.31 MALIGNANT NEOPLASM OF LOWER LOBE OF RIGHT LUNG: ICD-10-CM

## 2025-04-18 LAB — GLUCOSE BLDC GLUCOMTR-MCNC: 115 MG/DL (ref 70–130)

## 2025-04-18 PROCEDURE — 82948 REAGENT STRIP/BLOOD GLUCOSE: CPT

## 2025-04-18 PROCEDURE — A9552 F18 FDG: HCPCS | Performed by: INTERNAL MEDICINE

## 2025-04-18 PROCEDURE — 78815 PET IMAGE W/CT SKULL-THIGH: CPT

## 2025-04-18 PROCEDURE — 34310000005 FLUDEOXYGLUCOSE F18 SOLUTION: Performed by: INTERNAL MEDICINE

## 2025-04-18 RX ADMIN — FLUDEOXYGLUCOSE F 18 1 DOSE: 200 INJECTION, SOLUTION INTRAVENOUS at 10:53

## 2025-04-22 ENCOUNTER — OFFICE VISIT (OUTPATIENT)
Dept: ONCOLOGY | Facility: CLINIC | Age: 75
End: 2025-04-22
Payer: MEDICARE

## 2025-04-22 ENCOUNTER — LAB (OUTPATIENT)
Dept: LAB | Facility: HOSPITAL | Age: 75
End: 2025-04-22
Payer: MEDICARE

## 2025-04-22 ENCOUNTER — HOSPITAL ENCOUNTER (OUTPATIENT)
Dept: ONCOLOGY | Facility: HOSPITAL | Age: 75
Discharge: HOME | End: 2025-04-22
Payer: MEDICARE

## 2025-04-22 ENCOUNTER — TELEPHONE (OUTPATIENT)
Dept: ONCOLOGY | Facility: CLINIC | Age: 75
End: 2025-04-22

## 2025-04-22 VITALS
HEART RATE: 68 BPM | HEIGHT: 72 IN | TEMPERATURE: 98.2 F | DIASTOLIC BLOOD PRESSURE: 88 MMHG | BODY MASS INDEX: 26.41 KG/M2 | SYSTOLIC BLOOD PRESSURE: 152 MMHG | WEIGHT: 195 LBS | OXYGEN SATURATION: 99 %

## 2025-04-22 DIAGNOSIS — C34.31 MALIGNANT NEOPLASM OF LOWER LOBE OF RIGHT LUNG: Primary | ICD-10-CM

## 2025-04-22 DIAGNOSIS — R51.9 CHRONIC NONINTRACTABLE HEADACHE, UNSPECIFIED HEADACHE TYPE: ICD-10-CM

## 2025-04-22 DIAGNOSIS — G89.29 CHRONIC NONINTRACTABLE HEADACHE, UNSPECIFIED HEADACHE TYPE: ICD-10-CM

## 2025-04-22 DIAGNOSIS — C34.31 MALIGNANT NEOPLASM OF LOWER LOBE OF RIGHT LUNG: ICD-10-CM

## 2025-04-22 DIAGNOSIS — K63.89 COLONIC MASS: Primary | ICD-10-CM

## 2025-04-22 DIAGNOSIS — M54.2 NECK PAIN: ICD-10-CM

## 2025-04-22 LAB
ALBUMIN SERPL-MCNC: 3.9 G/DL (ref 3.5–5.2)
ALBUMIN/GLOB SERPL: 1.2 G/DL
ALP SERPL-CCNC: 90 U/L (ref 39–117)
ALT SERPL W P-5'-P-CCNC: 10 U/L (ref 1–41)
ANION GAP SERPL CALCULATED.3IONS-SCNC: 10 MMOL/L (ref 5–15)
AST SERPL-CCNC: 15 U/L (ref 1–40)
BASOPHILS # BLD AUTO: 0.02 10*3/MM3 (ref 0–0.2)
BASOPHILS NFR BLD AUTO: 0.2 % (ref 0–1.5)
BILIRUB SERPL-MCNC: 0.2 MG/DL (ref 0–1.2)
BUN SERPL-MCNC: 7 MG/DL (ref 8–23)
BUN/CREAT SERPL: 4.5 (ref 7–25)
CALCIUM SPEC-SCNC: 9.3 MG/DL (ref 8.6–10.5)
CHLORIDE SERPL-SCNC: 105 MMOL/L (ref 98–107)
CO2 SERPL-SCNC: 26 MMOL/L (ref 22–29)
CREAT SERPL-MCNC: 1.57 MG/DL (ref 0.76–1.27)
DEPRECATED RDW RBC AUTO: 48.4 FL (ref 37–54)
EGFRCR SERPLBLD CKD-EPI 2021: 45.4 ML/MIN/1.73
EOSINOPHIL # BLD AUTO: 0.06 10*3/MM3 (ref 0–0.4)
EOSINOPHIL NFR BLD AUTO: 0.6 % (ref 0.3–6.2)
ERYTHROCYTE [DISTWIDTH] IN BLOOD BY AUTOMATED COUNT: 13.1 % (ref 12.3–15.4)
GLOBULIN UR ELPH-MCNC: 3.3 GM/DL
GLUCOSE SERPL-MCNC: 127 MG/DL (ref 65–99)
HCT VFR BLD AUTO: 39.1 % (ref 37.5–51)
HGB BLD-MCNC: 12.7 G/DL (ref 13–17.7)
IMM GRANULOCYTES # BLD AUTO: 0.01 10*3/MM3 (ref 0–0.05)
IMM GRANULOCYTES NFR BLD AUTO: 0.1 % (ref 0–0.5)
LYMPHOCYTES # BLD AUTO: 2.03 10*3/MM3 (ref 0.7–3.1)
LYMPHOCYTES NFR BLD AUTO: 20.5 % (ref 19.6–45.3)
MCH RBC QN AUTO: 32.3 PG (ref 26.6–33)
MCHC RBC AUTO-ENTMCNC: 32.5 G/DL (ref 31.5–35.7)
MCV RBC AUTO: 99.5 FL (ref 79–97)
MONOCYTES # BLD AUTO: 0.7 10*3/MM3 (ref 0.1–0.9)
MONOCYTES NFR BLD AUTO: 7.1 % (ref 5–12)
NEUTROPHILS NFR BLD AUTO: 7.07 10*3/MM3 (ref 1.7–7)
NEUTROPHILS NFR BLD AUTO: 71.5 % (ref 42.7–76)
PLATELET # BLD AUTO: 267 10*3/MM3 (ref 140–450)
PMV BLD AUTO: 8.4 FL (ref 6–12)
POTASSIUM SERPL-SCNC: 3.6 MMOL/L (ref 3.5–5.2)
PROT SERPL-MCNC: 7.2 G/DL (ref 6–8.5)
RBC # BLD AUTO: 3.93 10*6/MM3 (ref 4.14–5.8)
SODIUM SERPL-SCNC: 141 MMOL/L (ref 136–145)
WBC NRBC COR # BLD AUTO: 9.89 10*3/MM3 (ref 3.4–10.8)

## 2025-04-22 PROCEDURE — G0463 HOSPITAL OUTPT CLINIC VISIT: HCPCS

## 2025-04-22 PROCEDURE — 80053 COMPREHEN METABOLIC PANEL: CPT

## 2025-04-22 PROCEDURE — 85025 COMPLETE CBC W/AUTO DIFF WBC: CPT

## 2025-04-22 PROCEDURE — 96523 IRRIG DRUG DELIVERY DEVICE: CPT

## 2025-04-22 PROCEDURE — 25010000002 HEPARIN LOCK FLUSH PER 10 UNITS: Performed by: INTERNAL MEDICINE

## 2025-04-22 PROCEDURE — 36415 COLL VENOUS BLD VENIPUNCTURE: CPT

## 2025-04-22 RX ORDER — SODIUM CHLORIDE 0.9 % (FLUSH) 0.9 %
10 SYRINGE (ML) INJECTION AS NEEDED
OUTPATIENT
Start: 2025-04-22

## 2025-04-22 RX ORDER — HEPARIN SODIUM (PORCINE) LOCK FLUSH IV SOLN 100 UNIT/ML 100 UNIT/ML
500 SOLUTION INTRAVENOUS AS NEEDED
OUTPATIENT
Start: 2025-04-22

## 2025-04-22 RX ORDER — SODIUM CHLORIDE 0.9 % (FLUSH) 0.9 %
10 SYRINGE (ML) INJECTION AS NEEDED
Status: CANCELLED | OUTPATIENT
Start: 2025-04-22

## 2025-04-22 RX ORDER — HEPARIN SODIUM (PORCINE) LOCK FLUSH IV SOLN 100 UNIT/ML 100 UNIT/ML
500 SOLUTION INTRAVENOUS AS NEEDED
Status: DISCONTINUED | OUTPATIENT
Start: 2025-04-22 | End: 2025-04-23 | Stop reason: HOSPADM

## 2025-04-22 RX ADMIN — HEPARIN 500 UNITS: 100 SYRINGE at 13:45
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
## 2025-04-22 NOTE — TELEPHONE ENCOUNTER
Caller: ISAÍAS ANDERSON    Relationship: Emergency Contact    Best call back number: [phone]      What was the call regarding: THEY ARE WANTING DR NOWAK TO SIGN OFF ON THE SCAN REPORT SO THEY CAN READ IT AND GET THEIR QUESTIONS TOGETHER

## 2025-04-24 ENCOUNTER — TELEPHONE (OUTPATIENT)
Dept: ONCOLOGY | Facility: CLINIC | Age: 75
End: 2025-04-24

## 2025-04-24 NOTE — TELEPHONE ENCOUNTER
Caller: ISAÍAS ANDESRON (ON  [facility])    Relationship: Emergency Contact    Best call back number: [phone]    What was the call regarding: DR. NOWAK PUT IN A INTERNAL REFERRAL FOR [facility]. THEY CAN NOT DO A COLON SCOPY FOR 5 MONTHS.    CAN DR. NOWAK CHANGE THE STATUS TO STAT OR URGENT.    CALL TO [facility]VISE

## 2025-04-26 NOTE — PROGRESS NOTES
[facility] Medicine Services  PROGRESS NOTE    Patient Name: David Barfield  : 1950  MRN: [id number]    Date of Admission: 2024  Primary Care Physician: Shahid Drake MD    Subjective   Subjective     CC:  Follow-up sepsis    HPI:  Patient sitting up in bed, no issues overnight. Continues to make good urine.     Objective   Objective     Vital Signs:   Temp:  [97.4 °F (36.3 °C)-98 °F (36.7 °C)] 98 °F (36.7 °C)  Heart Rate:  [70-77] 70  Resp:  [16-18] 18  BP: (134-150)/(71-89) 147/74     Physical Exam:  Constitutional: No acute distress, awake, alert  HENT: NCAT, mucous membranes moist  Respiratory: Clear to auscultation bilaterally, respiratory effort normal   Cardiovascular: RRR, no murmurs, rubs, or gallops  Gastrointestinal: soft, nontender, nondistended  Musculoskeletal: No bilateral ankle edema  Psychiatric: Appropriate affect, cooperative  Neurologic: Oriented x 3, speech clear, no focal deficits  Skin: No rashes    No changes         Results Reviewed:  LAB RESULTS:      Lab 24  0323 24  0333 24  0528 24  0428 04/15/24  1526 04/15/24  1137 04/15/24  0416 24  0603 24  2038 24  1611 24  0857 24  0330 24  0158 24  2311 24  1905   WBC 10.31 10.44 10.50 11.91*  --   --  16.58* 22.61*  --   --   --   --  46.05*  --  36.28*   HEMOGLOBIN 7.6* 7.7* 8.4* 8.0* 8.4*   < > 8.1* 8.7*  --   --   --   --  9.0*  --  12.4*   HEMATOCRIT 23.4* 23.2* 25.0* 24.1* 25.1*   < > 24.3* 27.2*  --   --   --   --  28.9*  --  40.0   PLATELETS 219 181 185 179  --   --  171 176  --   --   --   --  185  --  291   NEUTROS ABS 7.36* 7.52* 7.90*  --   --   --  14.09* 19.33*  --   --   --   --  42.12*   < > 34.47*   IMMATURE GRANS (ABS) 0.13* 0.08* 0.06*  --   --   --  0.11* 0.22*  --   --   --   --  1.50*   < >  --    LYMPHS ABS 1.35 1.31 1.09  --   --   --  1.00 1.46  --   --   --   --  0.64*   < >  --    MONOS ABS 1.23* 1.28*  1.19*  --   --   --  1.20* 1.28*  --   --   --   --  1.67*   < >  --    EOS ABS 0.21 0.23 0.24  --   --   --  0.16 0.26  --   --   --   --  0.00   < > 0.00   MCV 93.2 91.3 90.9 92.0  --   --  92.0 97.8*  --   --   --   --  99.3*  --  97.3*   PROCALCITONIN  --   --   --   --   --   --   --   --   --   --   --   --   --   --  4.88*   LACTATE  --   --   --   --   --   --   --   --  1.6 2.5* 2.1* 2.2* 2.1*   < > 6.7*    < > = values in this interval not displayed.         Lab 04/19/24  0323 04/18/24  0333 04/17/24  0528 04/16/24  0428 04/15/24  0416 04/14/24  0603 04/13/24  2038 04/13/24  1043 04/13/24  0330 04/13/24  0158 04/12/24  1905   0000   SODIUM 139 137 135* 135* 136 133*  --   --    < >  --  142  --    POTASSIUM 4.2 4.0 4.2 4.5 4.5 4.4   < > 5.7*   < >  --  3.4*  --    CHLORIDE 110* 105 102 103 104 101  --   --    < >  --  105  --    CO2 17.0* 19.0* 19.0* 21.0* 21.0* 20.0*  --   --    < >  --  21.0*  --    ANION GAP 12.0 13.0 14.0 11.0 11.0 12.0  --   --    < >  --  16.0*  --    BUN 45* 54* 53* 54* 49* 39*  --   --    < >  --  22  --    CREATININE 7.29* 8.45* 8.37* 7.03* 6.14* 4.58*  --   --    < >  --  2.23*  --    EGFR 7.2* 6.1* 6.1* 7.6* 8.9* 12.6*  --   --    < >  --  30.0*  --    GLUCOSE 103* 105* 98 98 102* 95  --   --    < >  --  117*  --    CALCIUM 8.0* 8.2* 7.9* 8.0* 7.7* 7.7*  --   --    < >  --  9.2  --    MAGNESIUM  --   --   --   --  2.5* 2.6*  --   --   --  1.3* 1.8  1.7  --    PHOSPHORUS 4.4 4.5  --   --  3.4 2.8  --  2.9  --  1.7*  --    < >   TSH  --   --   --   --   --   --   --   --   --  1.150  --   --     < > = values in this interval not displayed.         Lab 04/19/24  0323 04/18/24  0333 04/14/24  0603 04/13/24  0330 04/12/24  1905   TOTAL PROTEIN  --   --  6.0 5.5* 7.9   ALBUMIN 2.8* 2.4* 2.5* 2.4* 3.6   GLOBULIN  --   --  3.5 3.1 4.3   ALT (SGPT)  --   --  9 10 15   AST (SGOT)  --   --  15 21 33   BILIRUBIN  --   --  <0.2 0.2 0.4   ALK PHOS  --   --  61 50 81   LIPASE  --   --   --    --  31         Lab 04/13/24  0330 04/13/24  0158 04/12/24  1905   HSTROP T 37* 34* 35*                 Brief Urine Lab Results  (Last result in the past 365 days)        Color   Clarity   Blood   Leuk Est   Nitrite   Protein   CREAT   Urine HCG        04/13/24 1632             38.1                 Microbiology Results Abnormal       Procedure Component Value - Date/Time    Blood Culture - Blood, Wrist, Left [[id number]]  (Normal) Collected: 04/12/24 2009    Lab Status: Final result Specimen: Blood from Wrist, Left Updated: 04/17/24 2100     Blood Culture No growth at 5 days    Blood Culture - Blood, Arm, Right [[id number]]  (Normal) Collected: 04/12/24 2009    Lab Status: Final result Specimen: Blood from Arm, Right Updated: 04/17/24 2100     Blood Culture No growth at 5 days    Urine Culture - Urine, Straight Cath [[id number]]  (Normal) Collected: 04/12/24 2247    Lab Status: Final result Specimen: Urine from Straight Cath Updated: 04/14/24 1206     Urine Culture No growth    Gastrointestinal Panel, PCR - Stool, Per Rectum [[id number]]  (Normal) Collected: 04/14/24 0709    Lab Status: Final result Specimen: Stool from Per Rectum Updated: 04/14/24 0908     Campylobacter Not Detected     Plesiomonas shigelloides Not Detected     Salmonella Not Detected     Vibrio Not Detected     Vibrio cholerae Not Detected     Yersinia enterocolitica Not Detected     Enteroaggregative E. coli (EAEC) Not Detected     Enteropathogenic E. coli (EPEC) Not Detected     Enterotoxigenic E. coli (ETEC) lt/st Not Detected     Shiga-like toxin-producing E. coli (STEC) stx1/stx2 Not Detected     Shigella/Enteroinvasive E. coli (EIEC) Not Detected     Cryptosporidium Not Detected     Cyclospora cayetanensis Not Detected     Entamoeba histolytica Not Detected     Giardia lamblia Not Detected     Adenovirus F40/41 Not Detected     Astrovirus Not Detected     Norovirus GI/GII Not Detected     Rotavirus A Not Detected     Sapovirus (I, II, IV or V)  Not Detected    Clostridioides difficile Toxin - Stool, Per Rectum [[id number]]  (Normal) Collected: 04/14/24 0709    Lab Status: Final result Specimen: Stool from Per Rectum Updated: 04/14/24 0817    Narrative:      The following orders were created for panel order Clostridioides difficile Toxin - Stool, Per Rectum.  Procedure                               Abnormality         Status                     ---------                               -----------         ------                     Clostridioides difficile...[[id number]]  Normal              Final result                 Please view results for these tests on the individual orders.    Clostridioides difficile Toxin, PCR - Stool, Per Rectum [[id number]]  (Normal) Collected: 04/14/24 0709    Lab Status: Final result Specimen: Stool from Per Rectum Updated: 04/14/24 0817     Toxigenic C. difficile by PCR Not Detected    Narrative:      The result indicates the absence of toxigenic C. difficile from stool specimen.     Eosinophil Smear - Urine, Urine, Clean Catch [[id number]]  (Normal) Collected: 04/13/24 1631    Lab Status: Final result Specimen: Urine, Clean Catch Updated: 04/13/24 1732     Eosinophil Smear 0 % EOS/100 Cells     Narrative:      No eosinophil seen    MRSA Screen, PCR (Inpatient) - Swab, Nares [[id number]]  (Normal) Collected: 04/12/24 2128    Lab Status: Final result Specimen: Swab from Nares Updated: 04/13/24 0803     MRSA PCR Negative    Narrative:      The negative predictive value of this diagnostic test is high and should only be used to consider de-escalating anti-MRSA therapy. A positive result may indicate colonization with MRSA and must be correlated clinically.  MRSA Negative    COVID PRE-OP / PRE-PROCEDURE SCREENING ORDER (NO ISOLATION) - Swab, Nasopharynx [[id number]]  (Normal) Collected: 04/12/24 2010    Lab Status: Final result Specimen: Swab from Nasopharynx Updated: 04/12/24 2121    Narrative:      The following orders were created for  panel order COVID PRE-OP / PRE-PROCEDURE SCREENING ORDER (NO ISOLATION) - Swab, Nasopharynx.  Procedure                               Abnormality         Status                     ---------                               -----------         ------                     Respiratory Panel PCR w/...[[id number]]  Normal              Final result                 Please view results for these tests on the individual orders.    Respiratory Panel PCR w/COVID-19(SARS-CoV-2) [facility] In-House, NP Swab in UTM/VTM, 2 HR TAT - Swab, Nasopharynx [[id number]]  (Normal) Collected: 04/12/24 2010    Lab Status: Final result Specimen: Swab from Nasopharynx Updated: 04/12/24 2121     ADENOVIRUS, PCR Not Detected     Coronavirus 229E Not Detected     Coronavirus HKU1 Not Detected     Coronavirus NL63 Not Detected     Coronavirus OC43 Not Detected     COVID19 Not Detected     Human Metapneumovirus Not Detected     Human Rhinovirus/Enterovirus Not Detected     Influenza A PCR Not Detected     Influenza B PCR Not Detected     Parainfluenza Virus 1 Not Detected     Parainfluenza Virus 2 Not Detected     Parainfluenza Virus 3 Not Detected     Parainfluenza Virus 4 Not Detected     RSV, PCR Not Detected     Bordetella pertussis pcr Not Detected     Bordetella parapertussis PCR Not Detected     Chlamydophila pneumoniae PCR Not Detected     Mycoplasma pneumo by PCR Not Detected    Narrative:      In the setting of a positive respiratory panel with a viral infection PLUS a negative procalcitonin without other underlying concern for bacterial infection, consider observing off antibiotics or discontinuation of antibiotics and continue supportive care. If the respiratory panel is positive for atypical bacterial infection (Bordetella pertussis, Chlamydophila pneumoniae, or Mycoplasma pneumoniae), consider antibiotic de-escalation to target atypical bacterial infection.            No radiology results from the last 24 hrs    Results for  orders placed during the hospital encounter of 07/09/21    Adult Transthoracic Echo Complete W/ Cont if Necessary Per Protocol    Interpretation Summary  · Estimated left ventricular EF = 55% Left ventricular systolic function is normal.  · Left ventricular diastolic function was normal.  · Left ventricular wall thickness is consistent with mild concentric hypertrophy.  · Trace mitral and tricuspid regurgitation.      Current medications:  Scheduled Meds:amLODIPine, 5 mg, Oral, Q24H  budesonide-formoterol, 2 puff, Inhalation, BID - RT   And  tiotropium bromide monohydrate, 2 puff, Inhalation, Daily - RT  cefepime, 2,000 mg, Intravenous, Daily  heparin (porcine), 5,000 Units, Subcutaneous, Q8H  nicotine, 1 patch, Transdermal, Q24H  [Held by provider] pantoprazole, 40 mg, Oral, Q AM  pantoprazole, 40 mg, Intravenous, Q12H  pravastatin, 80 mg, Oral, Nightly  sodium chloride, 10 mL, Intravenous, Q12H  tamsulosin, 0.4 mg, Oral, Daily      Continuous Infusions:       PRN Meds:.  acetaminophen **OR** acetaminophen **OR** acetaminophen    Calcium Replacement - Follow Nurse / BPA Driven Protocol    hydrALAZINE    HYDROcodone-acetaminophen    Magnesium Standard Dose Replacement - Follow Nurse / BPA Driven Protocol    nicotine polacrilex    ondansetron    ondansetron ODT **OR** ondansetron    prochlorperazine    sodium chloride    sodium chloride    sodium chloride    Assessment & Plan   Assessment & Plan     Active Hospital Problems    Diagnosis  POA    **Sepsis [A41.9]  Yes    Severe malnutrition [E43]  Yes    ARACELI (acute kidney injury) [N17.9]  Unknown    Hypokalemia [E87.6]  Unknown    Acute pyelonephritis [N10]  Unknown    Primary hypertension [I10]  Yes    Centrilobular emphysema [J43.2]  Yes    Tobacco abuse [Z72.0]  Yes    Presence of cardiac pacemaker [Z95.0]  Yes    Mixed hyperlipidemia [E78.2]  Yes      Resolved Hospital Problems   No resolved problems to display.        Brief Hospital Course to date:  David Thayer  Lico is a 74 y.o. male with a PMH significant for non-small cell lung cancer s/p neoadjuvant chemoimmunotherapy and RLL lobectomy (1/2024) currently on Opdivo (last tx 4/4/24), tobacco use disorder, HTN, emphysema who resented to the ER due to nausea, vomiting, diarrhea.  Found to have severe sepsis secondary to acute pyelonephritis in immunosuppressed patient.    Severe sepsis - POA w/tachycardia, lactic acidosis, elevated procal, leukocytosis, hypotension  Acute pyelonephritis  Immunosuppressed host  - Continue cefepime, ID following  - C. difficile and GI panel negative  -- Blood cultures NGTD; urine culture negative, but imaging and presentation consistent with pyelonephritis, continue abx per ID  - Lactic acid normalized with IVF     Olguric ARACELI, POA - worsening  --Baseline creatinine 0.94-1.1, slightly improved today  --still with decent UOP  --Failed Lasix trial on 4/14  --Nephrology following, initiation of HD per Nephrology, ?need for renal biopsy, patient now agreeable, currently holding off    Possible GI bleed  Chronic Anemia  --Hemoglobin stable, given significant IVF, likely dilutional component  --PPI on hold due to worsening renal fxn  --transfuse PRN Hgb <7 or symptomatic anemia  --suspect Anemia of CD, can f/u outpatient w/Dr. Ashley for further work-up/management as indicated.     Non-small cell lung cancer  - Follows with Dr. Gely Ashley  - Last treatment with Opdivo 4/4/2024     Hypomagnesemia - resolved  - Replace per protocol    Hyperkalemia - resolved  --S/p lokelma      Hypertension  - Hold home lisinopril due to hypotension and ARACELI  - started norvasc w/improvement    Expected Discharge Location and Transportation: TBD  Expected Discharge   Expected Discharge Date: 4/17/2024; Expected Discharge Time:      DVT prophylaxis:  Medical DVT prophylaxis orders are present.         AM-PAC 6 Clicks Score (PT): 19 (04/19/24 0300)    CODE STATUS:   Code Status and Medical Interventions:   Ordered  at: 04/13/24 0017     Level Of Support Discussed With:    Patient     Code Status (Patient has no pulse and is not breathing):    CPR (Attempt to Resuscitate)     Medical Interventions (Patient has pulse or is breathing):    Full Support       Dionne Samaniego, DO  04/19/24       normal

## 2025-05-07 ENCOUNTER — PATIENT OUTREACH (OUTPATIENT)
Dept: CASE MANAGEMENT | Facility: OTHER | Age: 75
End: 2025-05-07
Payer: MEDICARE

## 2025-05-07 NOTE — OUTREACH NOTE
AMBULATORY CASE MANAGEMENT NOTE    Names and Relationships of Patient/Support Persons: Contact: BALAJI ANDERSON; Relationship: Emergency Contact -     Patient Outreach    Spoke with patient and his friend as an ACO proactive outreach. Friend stated patient is doing okay. Recently completed a colonoscopy. They will follow up with PCP on May 19. Balaji denied further needs at this time.     [place] MAYFIELD  Ambulatory Case Management    5/7/2025, 11:52 EDT

## 2025-05-14 ENCOUNTER — OFFICE VISIT (OUTPATIENT)
Dept: INTERNAL MEDICINE | Facility: CLINIC | Age: 75
End: 2025-05-14
Payer: MEDICARE

## 2025-05-14 VITALS
BODY MASS INDEX: 26.19 KG/M2 | HEIGHT: 72 IN | WEIGHT: 193.4 LBS | OXYGEN SATURATION: 97 % | SYSTOLIC BLOOD PRESSURE: 128 MMHG | TEMPERATURE: 96.6 F | DIASTOLIC BLOOD PRESSURE: 72 MMHG | HEART RATE: 71 BPM

## 2025-05-14 DIAGNOSIS — J43.2 CENTRILOBULAR EMPHYSEMA: ICD-10-CM

## 2025-05-14 DIAGNOSIS — C34.91 BRONCHOGENIC CANCER OF RIGHT LUNG: ICD-10-CM

## 2025-05-14 DIAGNOSIS — C34.31 MALIGNANT NEOPLASM OF LOWER LOBE OF RIGHT LUNG: ICD-10-CM

## 2025-05-14 DIAGNOSIS — R06.2 WHEEZING: ICD-10-CM

## 2025-05-14 DIAGNOSIS — J06.9 ACUTE URI: Primary | ICD-10-CM

## 2025-05-14 DIAGNOSIS — R05.9 COUGH, UNSPECIFIED TYPE: ICD-10-CM

## 2025-05-14 LAB
EXPIRATION DATE: NORMAL
FLUAV AG UPPER RESP QL IA.RAPID: NOT DETECTED
FLUBV AG UPPER RESP QL IA.RAPID: NOT DETECTED
INTERNAL CONTROL: NORMAL
Lab: NORMAL
SARS-COV-2 AG UPPER RESP QL IA.RAPID: NOT DETECTED

## 2025-05-14 RX ORDER — PREDNISONE 20 MG/1
20 TABLET ORAL DAILY
Qty: 5 TABLET | Refills: 0 | Status: SHIPPED | OUTPATIENT
Start: 2025-05-14

## 2025-05-14 RX ORDER — DOXYCYCLINE 100 MG/1
100 CAPSULE ORAL 2 TIMES DAILY
Qty: 14 CAPSULE | Refills: 0 | Status: SHIPPED | OUTPATIENT
Start: 2025-05-14 | End: 2025-05-21

## 2025-05-14 RX ORDER — BROMPHENIRAMINE MALEATE, PSEUDOEPHEDRINE HYDROCHLORIDE, AND DEXTROMETHORPHAN HYDROBROMIDE 2; 30; 10 MG/5ML; MG/5ML; MG/5ML
5 SYRUP ORAL 3 TIMES DAILY PRN
Qty: 200 ML | Refills: 0 | Status: SHIPPED | OUTPATIENT
Start: 2025-05-14

## 2025-05-14 RX ORDER — GUAIFENESIN 600 MG/1
600 TABLET, EXTENDED RELEASE ORAL 2 TIMES DAILY
Qty: 14 TABLET | Refills: 0 | Status: SHIPPED | OUTPATIENT
Start: 2025-05-14 | End: 2025-05-21

## 2025-05-14 NOTE — PROGRESS NOTES
Office Note     Name: David Barfield    : 1950     MRN: [id number]     Chief Complaint  Cough (Started a few days ago) and Vomiting    Subjective     History of Present Illness:  David Barfield is a 75 y.o. male who presents today for evaluation of acute complaints.  [facility].  Patient reports onset of symptoms was 1 week ago.  Patient reports he was sitting outside on his porch when the symptoms started, which he initially thought were allergies.  He has since been experiencing a cough.  He describes the cough as productive with a thick, yellow-colored mucus production.  He is also felt feverish and notes excessive sweating.  Patient does have a personal history of lung cancer.  Patient significant other had 3 leftover antibiotics she uses for dental prophylaxis.  She gave him 3 doses of antibiotics, she is unsure the name of the antibiotic used.  No further complaints or concerns at this time.        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    Coronary artery disease 2019    Diabetes mellitus Borderline    Emphysema/COPD     Erectile disorder     GERD (gastroesophageal reflux disease)     History of chemotherapy     Hyperlipidemia     Hypertension 2019    Lung nodule     Mumps     Mumps     Pruritus     after bath    Slow to wake up after anesthesia     Stage 5 chronic kidney disease not on chronic dialysis 2024    Wears dentures     upper only    Wears hearing aid in both ears     usually only wears right       Past Surgical History:   Procedure Laterality Date    BONE BIOPSY      broken bone surgery in his face    BRONCHOSCOPY THORACOTOMY Right 2024    Procedure: THORACOTOMY FOR LOWER LOBECTOMY AND MEDISTINAL LYMPH NODE DISSECTION RIGHT;  Surgeon: Joey Patel MD;  Location: [facility];  Service: Cardiothoracic;   Laterality: Right;    BRONCHOSCOPY WITH ION ROBOTIC ASSIST N/A 09/15/2023    Procedure: BRONCHOSCOPY NAVIGATION WITH ENDOBRONCHIAL ULTRASOUND AND ION ROBOT;  Surgeon: Octaviano Sampson MD;  Location:  [facility];  Service: Robotics - Pulmonary;  Laterality: N/A;  ion #6 - 0032  - 0015  Cath guide 0061    EBUS balloon removed and intact    CARDIAC ELECTROPHYSIOLOGY PROCEDURE N/A 08/17/2021    Procedure: Pacemaker DC new;  Surgeon: Kayy Box MD;  Location:  [facility];  Service: Cardiology;  Laterality: N/A;    FACIAL FRACTURE SURGERY      LYMPH NODE BIOPSY  2023    PACEMAKER IMPLANTATION         Social History     Socioeconomic History    Marital status: Single    Number of children: 3   Tobacco Use    Smoking status: Every Day     Current packs/day: 0.50     Average packs/day: 0.5 packs/day for 57.4 years (29.2 ttl pk-yrs)     Types: Cigarettes     Start date: 1/1/1968     Passive exposure: Current    Smokeless tobacco: Never    Tobacco comments:     Still smoke   Vaping Use    Vaping status: Never Used   Substance and Sexual Activity    Alcohol use: Never    Drug use: Never    Sexual activity: Yes     Partners: Female     Birth control/protection: None         Current Outpatient Medications:     albuterol sulfate  (90 Base) MCG/ACT inhaler, Inhale 2 puffs Every 4 (Four) Hours As Needed for Wheezing., Disp: 18 g, Rfl: 11    Ascorbic Acid (VITAMIN C PO), Take  by mouth., Disp: , Rfl:     aspirin 81 MG EC tablet, Take 1 tablet by mouth Daily for 360 days., Disp: 90 tablet, Rfl: 3    B Complex Vitamins (vitamin b complex) capsule capsule, Take 1 capsule by mouth Daily. OTC, Disp: , Rfl:     ferrous sulfate 325 (65 FE) MG tablet, Take 1 tablet by mouth Daily With Breakfast. OTC, Disp: , Rfl:     fludrocortisone 0.1 MG tablet, Take 1 tablet by mouth Daily., Disp: 90 tablet, Rfl: 1    fluticasone (VERAMYST) 27.5 MCG/SPRAY nasal spray, 2 sprays into the nostril(s) as directed  by provider 1 (One) Time As Needed for Rhinitis or Allergies., Disp: 6 mL, Rfl: 2    Fluticasone-Umeclidin-Vilant (Trelegy Ellipta) 100-62.5-25 MCG/ACT inhaler, Inhale 1 puff Daily., Disp: 3 each, Rfl: 3    HYDROcodone-acetaminophen (Norco) 7.5-325 MG per tablet, Take 1 tablet by mouth Every 6 (Six) Hours As Needed for Moderate Pain., Disp: 60 tablet, Rfl: 0    lidocaine-prilocaine (EMLA) 2.5-2.5 % cream, Apply 1 application  topically to the appropriate area as directed As Needed (45-60 minutes prior to port access.  Cover with saran/plastic wrap.)., Disp: 30 g, Rfl: 3    magnesium chloride ER 64 MG DR tablet, Take 1 tablet by mouth Daily., Disp: , Rfl:     midodrine (PROAMATINE) 10 MG tablet, Take 1 tablet by mouth 3 (Three) Times a Day As Needed (Systolic BP less then 100)., Disp: 30 tablet, Rfl: 0    omeprazole (priLOSEC) 40 MG capsule, Take 1 capsule by mouth Daily., Disp: 30 capsule, Rfl: 5    ondansetron (ZOFRAN) 4 MG tablet, , Disp: , Rfl:     ondansetron (ZOFRAN) 8 MG tablet, Take 1 tablet by mouth 3 (Three) Times a Day As Needed for Nausea or Vomiting., Disp: 30 tablet, Rfl: 2    Probiotic Product (PROBIOTIC BLEND PO), Take  by mouth., Disp: , Rfl:     rosuvastatin (CRESTOR) 40 MG tablet, Take 1 tablet by mouth Daily for 90 days., Disp: 30 tablet, Rfl: 2    sodium bicarbonate 650 MG tablet, Take 2 tablets by mouth 3 times a day., Disp: , Rfl:     VITAMIN A OP, Apply  to eye(s) as directed by provider., Disp: , Rfl:     VITAMIN D PO, Take  by mouth., Disp: , Rfl:     brompheniramine-pseudoephedrine-DM 30-2-10 MG/5ML syrup, Take 5 mL by mouth 3 (Three) Times a Day As Needed for Cough., Disp: 200 mL, Rfl: 0    doxycycline (VIBRAMYCIN) 100 MG capsule, Take 1 capsule by mouth 2 (Two) Times a Day for 7 days., Disp: 14 capsule, Rfl: 0    guaiFENesin (Mucinex) 600 MG 12 hr tablet, Take 1 tablet by mouth 2 (Two) Times a Day for 7 days., Disp: 14 tablet, Rfl: 0    megestrol (MEGACE) 20 MG tablet, Take 1 tablet by  "mouth Daily. (Patient not taking: Reported on 5/14/2025), Disp: , Rfl:     predniSONE (DELTASONE) 20 MG tablet, Take 1 tablet by mouth Daily., Disp: 5 tablet, Rfl: 0    Objective     Vital Signs  /72   Pulse 71   Temp 96.6 °F (35.9 °C) (Temporal)   Ht 182.9 cm (72.01\")   Wt 87.7 kg (193 lb 6.4 oz)   SpO2 97%   BMI 26.22 kg/m²   Estimated body mass index is 26.22 kg/m² as calculated from the following:    Height as of this encounter: 182.9 cm (72.01\").    Weight as of this encounter: 87.7 kg (193 lb 6.4 oz).           Physical Exam  Constitutional:       Appearance: Normal appearance. He is ill-appearing.   HENT:      Head: Normocephalic and atraumatic.      Nose: Nose normal.   Eyes:      Extraocular Movements: Extraocular movements intact.      Conjunctiva/sclera: Conjunctivae normal.      Pupils: Pupils are equal, round, and reactive to light.   Cardiovascular:      Rate and Rhythm: Normal rate and regular rhythm.      Heart sounds: Normal heart sounds.   Pulmonary:      Effort: Pulmonary effort is normal. No respiratory distress.      Breath sounds: Wheezing and rhonchi present.   Musculoskeletal:         General: Normal range of motion.      Cervical back: Normal range of motion and neck supple.   Skin:     General: Skin is warm and dry.   Neurological:      General: No focal deficit present.      Mental Status: He is alert and oriented to person, place, and time. Mental status is at baseline.   Psychiatric:         Mood and Affect: Mood normal.         Behavior: Behavior normal.         Thought Content: Thought content normal.         Judgment: Judgment normal.          Assessment and Plan     Diagnoses and all orders for this visit:    1. Acute URI (Primary)  -     doxycycline (VIBRAMYCIN) 100 MG capsule; Take 1 capsule by mouth 2 (Two) Times a Day for 7 days.  Dispense: 14 capsule; Refill: 0    2. Cough, unspecified type  -     POCT SARS-CoV-2 Antigen CE + Flu  -     guaiFENesin (Mucinex) 600 MG " 12 hr tablet; Take 1 tablet by mouth 2 (Two) Times a Day for 7 days.  Dispense: 14 tablet; Refill: 0  -     brompheniramine-pseudoephedrine-DM 30-2-10 MG/5ML syrup; Take 5 mL by mouth 3 (Three) Times a Day As Needed for Cough.  Dispense: 200 mL; Refill: 0  -     predniSONE (DELTASONE) 20 MG tablet; Take 1 tablet by mouth Daily.  Dispense: 5 tablet; Refill: 0    3. Wheezing    4. Bronchogenic cancer of right lung    5. Malignant neoplasm of lower lobe of right lung    6. Centrilobular emphysema    Plan:  COVID and flu testing in the office today negative.  Will treat with doxycycline twice daily for 7 days.  Will also send a prescription for Mucinex to take twice daily for 7 days.  Prednisone 20 mg daily sent to the pharmacy on file to take for 5 days.  Bromfed-DM sent to the pharmacy on file to use as needed for cough and congestion.  Continue with adequate oral hydration and rest.  Discussed with patient to wear sunscreen while taking doxycycline.    Return to clinic if symptoms worsen or fail to improve with current plan of care.  Follow-up with Hayley.    Follow Up  Return if symptoms worsen or fail to improve, for Next scheduled follow up, Follow up with Hay[facility].        ANAMIKA Bhat    Part of this note may be an electronic transcription/translation of spoken language to printed text using the Dragon Dictation System.

## 2025-05-27 ENCOUNTER — OFFICE VISIT (OUTPATIENT)
Dept: INTERNAL MEDICINE | Facility: CLINIC | Age: 75
End: 2025-05-27
Payer: MEDICARE

## 2025-05-27 ENCOUNTER — LAB (OUTPATIENT)
Dept: LAB | Facility: HOSPITAL | Age: 75
End: 2025-05-27
Payer: MEDICARE

## 2025-05-27 VITALS
HEIGHT: 72 IN | DIASTOLIC BLOOD PRESSURE: 82 MMHG | TEMPERATURE: 98 F | HEART RATE: 72 BPM | WEIGHT: 207 LBS | SYSTOLIC BLOOD PRESSURE: 122 MMHG | BODY MASS INDEX: 28.04 KG/M2 | OXYGEN SATURATION: 98 %

## 2025-05-27 DIAGNOSIS — J43.2 CENTRILOBULAR EMPHYSEMA: ICD-10-CM

## 2025-05-27 DIAGNOSIS — Z13.1 SCREENING FOR DIABETES MELLITUS: ICD-10-CM

## 2025-05-27 DIAGNOSIS — K64.8 INTERNAL HEMORRHOIDS: ICD-10-CM

## 2025-05-27 DIAGNOSIS — Z79.899 ON STATIN THERAPY: ICD-10-CM

## 2025-05-27 DIAGNOSIS — I95.9 HYPOTENSION, UNSPECIFIED HYPOTENSION TYPE: ICD-10-CM

## 2025-05-27 DIAGNOSIS — Z13.31 DEPRESSION SCREEN: ICD-10-CM

## 2025-05-27 DIAGNOSIS — N18.4 CHRONIC KIDNEY DISEASE, STAGE 4 (SEVERE): ICD-10-CM

## 2025-05-27 DIAGNOSIS — C34.31 MALIGNANT NEOPLASM OF LOWER LOBE OF RIGHT LUNG: ICD-10-CM

## 2025-05-27 DIAGNOSIS — J30.89 NON-SEASONAL ALLERGIC RHINITIS DUE TO OTHER ALLERGIC TRIGGER: ICD-10-CM

## 2025-05-27 DIAGNOSIS — C34.91 BRONCHOGENIC CANCER OF RIGHT LUNG: ICD-10-CM

## 2025-05-27 DIAGNOSIS — E78.2 MIXED HYPERLIPIDEMIA: ICD-10-CM

## 2025-05-27 DIAGNOSIS — Z09 ENCOUNTER FOR FOLLOW-UP: Primary | ICD-10-CM

## 2025-05-27 DIAGNOSIS — K63.5 POLYP OF COLON, UNSPECIFIED PART OF COLON, UNSPECIFIED TYPE: ICD-10-CM

## 2025-05-27 LAB
ALBUMIN UR-MCNC: 1.6 MG/DL
BILIRUB UR QL STRIP: NEGATIVE
CHOLEST SERPL-MCNC: 146 MG/DL (ref 0–200)
CLARITY UR: CLEAR
COLOR UR: YELLOW
CREAT UR-MCNC: 72.9 MG/DL
GLUCOSE UR STRIP-MCNC: NEGATIVE MG/DL
HBA1C MFR BLD: 5.8 % (ref 4.8–5.6)
HDLC SERPL-MCNC: 32 MG/DL (ref 40–60)
HGB UR QL STRIP.AUTO: NEGATIVE
HOLD SPECIMEN: NORMAL
KETONES UR QL STRIP: NEGATIVE
LDLC SERPL CALC-MCNC: 90 MG/DL (ref 0–100)
LDLC/HDLC SERPL: 2.73 {RATIO}
LEUKOCYTE ESTERASE UR QL STRIP.AUTO: NEGATIVE
MICROALBUMIN/CREAT UR: 21.9 MG/G (ref 0–29)
NITRITE UR QL STRIP: NEGATIVE
PH UR STRIP.AUTO: 8 [PH] (ref 5–8)
PROT UR QL STRIP: ABNORMAL
SP GR UR STRIP: 1.01 (ref 1–1.03)
TRIGL SERPL-MCNC: 134 MG/DL (ref 0–150)
UROBILINOGEN UR QL STRIP: ABNORMAL
VLDLC SERPL-MCNC: 24 MG/DL (ref 5–40)

## 2025-05-27 PROCEDURE — 81001 URINALYSIS AUTO W/SCOPE: CPT

## 2025-05-27 PROCEDURE — 80061 LIPID PANEL: CPT

## 2025-05-27 PROCEDURE — 1126F AMNT PAIN NOTED NONE PRSNT: CPT | Performed by: NURSE PRACTITIONER

## 2025-05-27 PROCEDURE — 3074F SYST BP LT 130 MM HG: CPT | Performed by: NURSE PRACTITIONER

## 2025-05-27 PROCEDURE — 3079F DIAST BP 80-89 MM HG: CPT | Performed by: NURSE PRACTITIONER

## 2025-05-27 PROCEDURE — 82570 ASSAY OF URINE CREATININE: CPT

## 2025-05-27 PROCEDURE — 99214 OFFICE O/P EST MOD 30 MIN: CPT | Performed by: NURSE PRACTITIONER

## 2025-05-27 PROCEDURE — 83036 HEMOGLOBIN GLYCOSYLATED A1C: CPT

## 2025-05-27 PROCEDURE — 1159F MED LIST DOCD IN RCRD: CPT | Performed by: NURSE PRACTITIONER

## 2025-05-27 PROCEDURE — G2211 COMPLEX E/M VISIT ADD ON: HCPCS | Performed by: NURSE PRACTITIONER

## 2025-05-27 PROCEDURE — 82043 UR ALBUMIN QUANTITATIVE: CPT

## 2025-05-27 PROCEDURE — 1160F RVW MEDS BY RX/DR IN RCRD: CPT | Performed by: NURSE PRACTITIONER

## 2025-05-27 RX ORDER — FEXOFENADINE HCL 180 MG/1
180 TABLET ORAL DAILY
Qty: 90 TABLET | Refills: 1 | Status: SHIPPED | OUTPATIENT
Start: 2025-05-27

## 2025-05-27 NOTE — PROGRESS NOTES
Office Note     Name: David Barfield    : 1950     MRN: [id number]     Chief Complaint  Hypotension    Subjective     History of Present Illness:  David Barfield is a 75 y.o. male who presents today for chronic care follow up    Patient was seen in the beginning of the month for respiratory symptoms.  Patient states he is not currently taking his albuterol or Trelegy inhaler.  He does take a daily Allegra.  He continues to describe sputum.    Patient continues to follow with endocrinology, hematology/oncology, pulmonology, cardiology, GI, nephrology  - Patient is following with Dr. Ashley with hematology/oncology regarding non-small cell lung cancer.  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  Plan follow-up in 6 months with spirometry  - Patient is following with cardiology regarding presence of pacemaker, CAD, hyperlipidemia, hypotension    Patient had a wellness exam completed 2024    No excessive alcohol intake or drug use  Patient is a current smoker.  Started .  This is 50+ years.  He was smoking a maximum of 2 packs/day when he was smoking his maximum  -continuing to decrease smoking use    Patient is also following with infectious disease    Patient declined immunizations at previous visit    Colonoscopy completed through  [facility] May 2025 with colon polyps and internal hemorrhoids    CBC and comprehensive metabolic panel were ordered on .  Patient did have a note today from his significant other requesting updated kidney function and cortisol level              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    Coronary artery disease 2019    Diabetes mellitus Borderline    Emphysema/COPD     Erectile  disorder     GERD (gastroesophageal reflux disease)     History of chemotherapy     Hyperlipidemia     Hypertension 2019    Lung nodule     Mumps     Mumps     Pruritus     after bath    Slow to wake up after anesthesia     Stage 5 chronic kidney disease not on chronic dialysis 05/14/2024    Wears dentures     upper only    Wears hearing aid in both ears     usually only wears right       Past Surgical History:   Procedure Laterality Date    BONE BIOPSY      broken bone surgery in his face    BRONCHOSCOPY THORACOTOMY Right 01/09/2024    Procedure: THORACOTOMY FOR LOWER LOBECTOMY AND MEDISTINAL LYMPH NODE DISSECTION RIGHT;  Surgeon: Joey Patel MD;  Location:  [facility];  Service: Cardiothoracic;  Laterality: Right;    BRONCHOSCOPY WITH ION ROBOTIC ASSIST N/A 09/15/2023    Procedure: BRONCHOSCOPY NAVIGATION WITH ENDOBRONCHIAL ULTRASOUND AND ION ROBOT;  Surgeon: Octaviano Sampson MD;  Location:  [facility];  Service: Robotics - Pulmonary;  Laterality: N/A;  ion #6 - 0032  - 0015  Cath guide 0061    EBUS balloon removed and intact    CARDIAC ELECTROPHYSIOLOGY PROCEDURE N/A 08/17/2021    Procedure: Pacemaker DC new;  Surgeon: Kayy Box MD;  Location:  [facility];  Service: Cardiology;  Laterality: N/A;    FACIAL FRACTURE SURGERY      LYMPH NODE BIOPSY  2023    PACEMAKER IMPLANTATION         Social History     Socioeconomic History    Marital status: Single    Number of children: 3   Tobacco Use    Smoking status: Every Day     Current packs/day: 0.50     Average packs/day: 0.5 packs/day for 57.4 years (29.2 ttl pk-yrs)     Types: Cigarettes     Start date: 1/1/1968     Passive exposure: Current    Smokeless tobacco: Never    Tobacco comments:     Still smoke   Vaping Use    Vaping status: Never Used   Substance and Sexual Activity    Alcohol use: Never    Drug use: Never    Sexual activity: Yes     Partners: Female     Birth control/protection: None         Current  Outpatient Medications:     albuterol sulfate  (90 Base) MCG/ACT inhaler, Inhale 2 puffs Every 4 (Four) Hours As Needed for Wheezing., Disp: 18 g, Rfl: 11    Ascorbic Acid (VITAMIN C PO), Take  by mouth., Disp: , Rfl:     aspirin 81 MG EC tablet, Take 1 tablet by mouth Daily for 360 days., Disp: 90 tablet, Rfl: 3    B Complex Vitamins (vitamin b complex) capsule capsule, Take 1 capsule by mouth Daily. OTC, Disp: , Rfl:     ferrous sulfate 325 (65 FE) MG tablet, Take 1 tablet by mouth Daily With Breakfast. OTC, Disp: , Rfl:     fludrocortisone 0.1 MG tablet, Take 1 tablet by mouth Daily., Disp: 90 tablet, Rfl: 1    fluticasone (VERAMYST) 27.5 MCG/SPRAY nasal spray, Administer 2 sprays into the nostril(s) as directed by provider 1 (One) Time As Needed for Rhinitis or Allergies., Disp: 6 mL, Rfl: 2    Fluticasone-Umeclidin-Vilant (Trelegy Ellipta) 100-62.5-25 MCG/ACT inhaler, Inhale 1 puff Daily., Disp: 3 each, Rfl: 3    lidocaine-prilocaine (EMLA) 2.5-2.5 % cream, Apply 1 application  topically to the appropriate area as directed As Needed (45-60 minutes prior to port access.  Cover with saran/plastic wrap.)., Disp: 30 g, Rfl: 3    magnesium chloride ER 64 MG DR tablet, Take 1 tablet by mouth Daily., Disp: , Rfl:     midodrine (PROAMATINE) 10 MG tablet, Take 1 tablet by mouth 3 (Three) Times a Day As Needed (Systolic BP less then 100)., Disp: 30 tablet, Rfl: 0    omeprazole (priLOSEC) 40 MG capsule, Take 1 capsule by mouth Daily., Disp: 30 capsule, Rfl: 5    ondansetron (ZOFRAN) 8 MG tablet, Take 1 tablet by mouth 3 (Three) Times a Day As Needed for Nausea or Vomiting., Disp: 30 tablet, Rfl: 2    sodium bicarbonate 650 MG tablet, Take 2 tablets by mouth 3 times a day., Disp: , Rfl:     VITAMIN A OP, Apply  to eye(s) as directed by provider., Disp: , Rfl:     VITAMIN D PO, Take  by mouth., Disp: , Rfl:     fexofenadine (Allegra Allergy) 180 MG tablet, Take 1 tablet by mouth Daily., Disp: 90 tablet, Rfl: 1     "rosuvastatin (CRESTOR) 40 MG tablet, Take 1 tablet by mouth Daily for 90 days., Disp: 30 tablet, Rfl: 2    Objective     Vital Signs  /82 (BP Location: Left arm, Patient Position: Sitting, Cuff Size: Adult)   Pulse 72   Temp 98 °F (36.7 °C)   Ht 182.9 cm (72\")   Wt 93.9 kg (207 lb)   SpO2 98%   BMI 28.07 kg/m²   Estimated body mass index is 28.07 kg/m² as calculated from the following:    Height as of this encounter: 182.9 cm (72\").    Weight as of this encounter: 93.9 kg (207 lb).            PHQ-9 Depression Screening  Little interest or pleasure in doing things? Not at all   Feeling down, depressed, or hopeless? Not at all   PHQ-2 Total Score 0   Trouble falling or staying asleep, or sleeping too much?     Feeling tired or having little energy?     Poor appetite or overeating?     Feeling bad about yourself - or that you are a failure or have let yourself or your family down?     Trouble concentrating on things, such as reading the newspaper or watching television?     Moving or speaking so slowly that other people could have noticed? Or the opposite - being so fidgety or restless that you have been moving around a lot more than usual?     Thoughts that you would be better off dead, or of hurting yourself in some way?     PHQ-9 Total Score     If you checked off any problems, how difficult have these problems made it for you to do your work, take care of things at home, or get along with other people? Not difficult at all     PHQ-9 Total Score:             Physical Exam  Vitals and nursing note reviewed.   Constitutional:       Appearance: Normal appearance.   HENT:      Head: Normocephalic and atraumatic.   Eyes:      Extraocular Movements: Extraocular movements intact.      Pupils: Pupils are equal, round, and reactive to light.   Cardiovascular:      Rate and Rhythm: Normal rate and regular rhythm.      Heart sounds: Normal heart sounds.   Pulmonary:      Effort: Pulmonary effort is normal.      " Breath sounds: Rhonchi present.   Musculoskeletal:         General: Normal range of motion.   Skin:     General: Skin is warm and dry.   Neurological:      Mental Status: He is alert and oriented to person, place, and time.   Psychiatric:         Mood and Affect: Mood normal.         Behavior: Behavior normal.                   Assessment and Plan     Diagnoses and all orders for this visit:    1. Encounter for follow-up (Primary)    2. Non-seasonal allergic rhinitis due to other allergic trigger  -     fluticasone (VERAMYST) 27.5 MCG/SPRAY nasal spray; Administer 2 sprays into the nostril(s) as directed by provider 1 (One) Time As Needed for Rhinitis or Allergies.  Dispense: 6 mL; Refill: 2  -     fexofenadine (Allegra Allergy) 180 MG tablet; Take 1 tablet by mouth Daily.  Dispense: 90 tablet; Refill: 1    3. Mixed hyperlipidemia  -     Lipid Panel; Future    4. On statin therapy    5. Chronic kidney disease, stage 4 (severe)    6. Hypotension, unspecified hypotension type  -     Urinalysis With Culture If Indicated -; Future  -     Microalbumin / Creatinine Urine Ratio - Urine, Clean Catch; Future    7. Screening for diabetes mellitus  -     Hemoglobin A1c; Future    8. Centrilobular emphysema    9. Malignant neoplasm of lower lobe of right lung    10. Bronchogenic cancer of right lung    11. Polyp of colon, unspecified part of colon, unspecified type    12. Internal hemorrhoids    13. Depression screen    Plan  Follow-up visit completed with patient today    I did encourage patient to restart his inhalers including Trelegy and albuterol especially during this time of increase sputum production no recent respiratory illness.  Patient will also be started back on a steroid nasal spray as well as daily antihistamine    Patient recently had CBC and comprehensive metabolic panel ordered.  He will complete this today along with A1c, urinalysis, lipid panel    Depression screen with negative results    Will follow-up  with his other providers regarding recheck of the cortisol level    Go to ER if any condition worsens or severe    Plan to follow-up as scheduled in November    Follow Up  Return for Next scheduled follow up.    ANAMIKA Appiah    Part of this note may be an electronic transcription/translation of spoken language to printed text using the Dragon Dictation System.

## 2025-05-27 NOTE — Clinical Note
Family member requested cortisol level? Not sure exactly why. Is this something you would order at his next visit? Is this needed?

## 2025-05-28 ENCOUNTER — RESULTS FOLLOW-UP (OUTPATIENT)
Dept: LAB | Facility: HOSPITAL | Age: 75
End: 2025-05-28
Payer: MEDICARE

## 2025-05-28 LAB
BACTERIA UR QL AUTO: NORMAL /HPF
HYALINE CASTS UR QL AUTO: NORMAL /LPF
RBC # UR STRIP: NORMAL /HPF
REF LAB TEST METHOD: NORMAL
SQUAMOUS #/AREA URNS HPF: NORMAL /HPF
WBC # UR STRIP: NORMAL /HPF

## 2025-05-28 NOTE — TELEPHONE ENCOUNTER
Name: ISAÍAS ANDERSON      Relationship: Emergency Contact      Best Callback Number: [phone]       [facility] PROVIDED THE RELAY MESSAGE FROM THE OFFICE      PATIENT: VOICED UNDERSTANDING AND HAS NO FURTHER QUESTIONS AT THIS TIME    ADDITIONAL INFORMATION:

## 2025-05-30 ENCOUNTER — HOSPITAL ENCOUNTER (OUTPATIENT)
Dept: ONCOLOGY | Facility: HOSPITAL | Age: 75
Discharge: HOME | End: 2025-05-30
Payer: MEDICARE

## 2025-05-30 VITALS
BODY MASS INDEX: 25.47 KG/M2 | TEMPERATURE: 98 F | SYSTOLIC BLOOD PRESSURE: 116 MMHG | DIASTOLIC BLOOD PRESSURE: 75 MMHG | RESPIRATION RATE: 16 BRPM | HEART RATE: 88 BPM | WEIGHT: 188 LBS | HEIGHT: 72 IN

## 2025-05-30 DIAGNOSIS — C34.31 MALIGNANT NEOPLASM OF LOWER LOBE OF RIGHT LUNG: Primary | ICD-10-CM

## 2025-05-30 DIAGNOSIS — I25.10 CORONARY ARTERY DISEASE INVOLVING NATIVE CORONARY ARTERY OF NATIVE HEART WITHOUT ANGINA PECTORIS: Chronic | ICD-10-CM

## 2025-05-30 PROCEDURE — 96523 IRRIG DRUG DELIVERY DEVICE: CPT

## 2025-05-30 PROCEDURE — 25010000002 HEPARIN LOCK FLUSH PER 10 UNITS: Performed by: INTERNAL MEDICINE

## 2025-05-30 RX ORDER — HEPARIN SODIUM (PORCINE) LOCK FLUSH IV SOLN 100 UNIT/ML 100 UNIT/ML
500 SOLUTION INTRAVENOUS AS NEEDED
Status: DISCONTINUED | OUTPATIENT
Start: 2025-05-30 | End: 2025-05-31 | Stop reason: HOSPADM

## 2025-05-30 RX ORDER — HEPARIN SODIUM (PORCINE) LOCK FLUSH IV SOLN 100 UNIT/ML 100 UNIT/ML
500 SOLUTION INTRAVENOUS AS NEEDED
OUTPATIENT
Start: 2025-05-30

## 2025-05-30 RX ORDER — SODIUM CHLORIDE 0.9 % (FLUSH) 0.9 %
10 SYRINGE (ML) INJECTION AS NEEDED
OUTPATIENT
Start: 2025-05-30

## 2025-05-30 RX ORDER — ROSUVASTATIN CALCIUM 40 MG/1
40 TABLET, COATED ORAL DAILY
Qty: 90 TABLET | Refills: 3 | Status: SHIPPED | OUTPATIENT
Start: 2025-05-30

## 2025-05-30 RX ADMIN — HEPARIN 500 UNITS: 100 SYRINGE at 14:55

## 2025-05-30 NOTE — TELEPHONE ENCOUNTER
Caller: ISAÍAS ANDERSON    Relationship: Emergency Contact    Best call back number: [phone]     Caller requesting test results: GIRLFRIEND     What test was performed: LABS    When was the test performed: 5/27/25    Where was the test performed: OFFICE    Additional notes: REQUESTING A CALL BACK TO DISCUSS LAB RESULTS

## 2025-05-30 NOTE — TELEPHONE ENCOUNTER
Called pt's girlfriend and let her know that PCP was out of office and would not be back until Tuesday. Pt's girlfriend voiced concern that pt was sleeping too much and requested another provider review and release to [facility], stating they are looking to go out of town and voiced concern for travel at this time.   Requesting another provider review labs.   Please advise.

## 2025-06-03 ENCOUNTER — TELEPHONE (OUTPATIENT)
Dept: INTERNAL MEDICINE | Facility: CLINIC | Age: 75
End: 2025-06-03
Payer: MEDICARE

## 2025-06-03 NOTE — TELEPHONE ENCOUNTER
"ISAÍAS ASKING IF [facility] CAN \"RELEASE\" THE BLOOD WORK RESULTS FROM 5/27 TO [facility]. SHE SAID THEY CAN SEE THE RESULTS FOR THE URINE TEST, BUT NOT THE BLOOD.       "

## 2025-06-03 NOTE — TELEPHONE ENCOUNTER
Called and left detailed VM with message from provider and office number if pt had questions.   OK for [facility]UB to relay message from provider.

## 2025-06-05 NOTE — TELEPHONE ENCOUNTER
Advised patient to contact [employer] help desk to see if they could advise.  It appears on our end they are released and patient has seen.  Results mailed to patient.

## 2025-06-27 ENCOUNTER — HOSPITAL ENCOUNTER (OUTPATIENT)
Dept: ONCOLOGY | Facility: HOSPITAL | Age: 75
Discharge: HOME | End: 2025-06-27
Payer: MEDICARE

## 2025-06-27 VITALS
HEART RATE: 95 BPM | DIASTOLIC BLOOD PRESSURE: 74 MMHG | BODY MASS INDEX: 25.63 KG/M2 | TEMPERATURE: 98.6 F | WEIGHT: 189 LBS | RESPIRATION RATE: 20 BRPM | SYSTOLIC BLOOD PRESSURE: 130 MMHG

## 2025-06-27 DIAGNOSIS — C34.31 MALIGNANT NEOPLASM OF LOWER LOBE OF RIGHT LUNG: Primary | ICD-10-CM

## 2025-06-27 PROCEDURE — 25010000002 HEPARIN LOCK FLUSH PER 10 UNITS: Performed by: INTERNAL MEDICINE

## 2025-06-27 PROCEDURE — 96523 IRRIG DRUG DELIVERY DEVICE: CPT

## 2025-06-27 RX ORDER — SODIUM CHLORIDE 0.9 % (FLUSH) 0.9 %
10 SYRINGE (ML) INJECTION AS NEEDED
OUTPATIENT
Start: 2025-06-27

## 2025-06-27 RX ORDER — HEPARIN SODIUM (PORCINE) LOCK FLUSH IV SOLN 100 UNIT/ML 100 UNIT/ML
500 SOLUTION INTRAVENOUS AS NEEDED
Status: DISCONTINUED | OUTPATIENT
Start: 2025-06-27 | End: 2025-06-28 | Stop reason: HOSPADM

## 2025-06-27 RX ORDER — HEPARIN SODIUM (PORCINE) LOCK FLUSH IV SOLN 100 UNIT/ML 100 UNIT/ML
500 SOLUTION INTRAVENOUS AS NEEDED
OUTPATIENT
Start: 2025-06-27

## 2025-06-27 RX ADMIN — HEPARIN 500 UNITS: 100 SYRINGE at 14:56

## 2025-07-09 LAB
MC_CV_MDC_IDC_RATE_1: 160
MC_CV_MDC_IDC_ZONE_ID: 1
MDC_IDC_MSMT_BATTERY_REMAINING_LONGEVITY: 66 MO
MDC_IDC_MSMT_BATTERY_REMAINING_PERCENTAGE: 100 %
MDC_IDC_MSMT_BATTERY_STATUS: NORMAL
MDC_IDC_MSMT_LEADCHNL_RA_DTM: NORMAL
MDC_IDC_MSMT_LEADCHNL_RA_IMPEDANCE_VALUE: 721
MDC_IDC_MSMT_LEADCHNL_RA_PACING_THRESHOLD_POLARITY: NORMAL
MDC_IDC_MSMT_LEADCHNL_RV_DTM: NORMAL
MDC_IDC_MSMT_LEADCHNL_RV_IMPEDANCE_VALUE: 708
MDC_IDC_MSMT_LEADCHNL_RV_PACING_THRESHOLD_POLARITY: NORMAL
MDC_IDC_MSMT_LEADCHNL_RV_SENSING_INTR_AMPL: 6.2
MDC_IDC_PG_IMPLANT_DTM: NORMAL
MDC_IDC_PG_MFG: NORMAL
MDC_IDC_PG_MODEL: NORMAL
MDC_IDC_PG_SERIAL: NORMAL
MDC_IDC_PG_TYPE: NORMAL
MDC_IDC_SESS_DTM: NORMAL
MDC_IDC_SESS_TYPE: NORMAL
MDC_IDC_SET_BRADY_AT_MODE_SWITCH_RATE: 170
MDC_IDC_SET_BRADY_LOWRATE: 70
MDC_IDC_SET_BRADY_MAX_SENSOR_RATE: 130
MDC_IDC_SET_BRADY_MAX_TRACKING_RATE: 130
MDC_IDC_SET_BRADY_MODE: NORMAL
MDC_IDC_SET_BRADY_PAV_DELAY: 220
MDC_IDC_SET_BRADY_SAV_DELAY: 220
MDC_IDC_SET_LEADCHNL_RA_PACING_AMPLITUDE: 2
MDC_IDC_SET_LEADCHNL_RA_PACING_POLARITY: NORMAL
MDC_IDC_SET_LEADCHNL_RA_PACING_PULSEWIDTH: 0.4
MDC_IDC_SET_LEADCHNL_RA_SENSING_POLARITY: NORMAL
MDC_IDC_SET_LEADCHNL_RA_SENSING_SENSITIVITY: 0.25
MDC_IDC_SET_LEADCHNL_RV_PACING_AMPLITUDE: 2
MDC_IDC_SET_LEADCHNL_RV_PACING_POLARITY: NORMAL
MDC_IDC_SET_LEADCHNL_RV_PACING_PULSEWIDTH: 0.4
MDC_IDC_SET_LEADCHNL_RV_SENSING_POLARITY: NORMAL
MDC_IDC_SET_LEADCHNL_RV_SENSING_SENSITIVITY: 0.6
MDC_IDC_SET_ZONE_STATUS: NORMAL
MDC_IDC_SET_ZONE_TYPE: NORMAL
MDC_IDC_STAT_AT_BURDEN_PERCENT: 0
MDC_IDC_STAT_BRADY_RA_PERCENT_PACED: 98
MDC_IDC_STAT_BRADY_RV_PERCENT_PACED: 2

## 2025-07-16 ENCOUNTER — HOSPITAL ENCOUNTER (OUTPATIENT)
Dept: PET IMAGING | Facility: HOSPITAL | Age: 75
Discharge: HOME | End: 2025-07-16
Payer: MEDICARE

## 2025-07-16 DIAGNOSIS — C34.31 MALIGNANT NEOPLASM OF LOWER LOBE OF RIGHT LUNG: ICD-10-CM

## 2025-07-16 LAB — GLUCOSE BLDC GLUCOMTR-MCNC: 100 MG/DL (ref 70–130)

## 2025-07-16 PROCEDURE — 34310000005 FLUDEOXYGLUCOSE F18 SOLUTION: Performed by: INTERNAL MEDICINE

## 2025-07-16 PROCEDURE — A9552 F18 FDG: HCPCS | Performed by: INTERNAL MEDICINE

## 2025-07-16 PROCEDURE — 78815 PET IMAGE W/CT SKULL-THIGH: CPT

## 2025-07-16 PROCEDURE — 82948 REAGENT STRIP/BLOOD GLUCOSE: CPT

## 2025-07-16 RX ADMIN — FLUDEOXYGLUCOSE F 18 1 DOSE: 200 INJECTION, SOLUTION INTRAVENOUS at 10:26

## 2025-07-21 ENCOUNTER — TELEPHONE (OUTPATIENT)
Dept: ONCOLOGY | Facility: CLINIC | Age: 75
End: 2025-07-21
Payer: MEDICARE

## 2025-07-21 NOTE — TELEPHONE ENCOUNTER
DENEEN AND HIS FRIEND ISAÍAS CALLED WANTED THE PET SCAN RESULTS RELEASED WHERE THEY CAN SEE RESULTS BEFORE APPT SO THEY KNOW WHAT QUESTIONS TO ASK

## 2025-07-21 NOTE — TELEPHONE ENCOUNTER
Balaji advised no control over when system releases results to patient's my chart.  She verbalized understanding.

## 2025-07-22 ENCOUNTER — OFFICE VISIT (OUTPATIENT)
Dept: ONCOLOGY | Facility: CLINIC | Age: 75
End: 2025-07-22
Payer: MEDICARE

## 2025-07-22 ENCOUNTER — HOSPITAL ENCOUNTER (OUTPATIENT)
Dept: ONCOLOGY | Facility: HOSPITAL | Age: 75
Discharge: HOME | End: 2025-07-22
Admitting: INTERNAL MEDICINE
Payer: MEDICARE

## 2025-07-22 VITALS
WEIGHT: 189 LBS | OXYGEN SATURATION: 97 % | RESPIRATION RATE: 16 BRPM | BODY MASS INDEX: 25.6 KG/M2 | SYSTOLIC BLOOD PRESSURE: 144 MMHG | HEIGHT: 72 IN | HEART RATE: 73 BPM | TEMPERATURE: 97.3 F | DIASTOLIC BLOOD PRESSURE: 84 MMHG

## 2025-07-22 DIAGNOSIS — C34.31 MALIGNANT NEOPLASM OF LOWER LOBE OF RIGHT LUNG: Primary | ICD-10-CM

## 2025-07-22 LAB
ALBUMIN SERPL-MCNC: 4 G/DL (ref 3.5–5.2)
ALBUMIN/GLOB SERPL: 1.2 G/DL
ALP SERPL-CCNC: 101 U/L (ref 39–117)
ALT SERPL W P-5'-P-CCNC: 9 U/L (ref 1–41)
ANION GAP SERPL CALCULATED.3IONS-SCNC: 10 MMOL/L (ref 5–15)
AST SERPL-CCNC: 17 U/L (ref 1–40)
BASOPHILS # BLD AUTO: 0.02 10*3/MM3 (ref 0–0.2)
BASOPHILS NFR BLD AUTO: 0.2 % (ref 0–1.5)
BILIRUB SERPL-MCNC: 0.2 MG/DL (ref 0–1.2)
BUN SERPL-MCNC: 10.2 MG/DL (ref 8–23)
BUN/CREAT SERPL: 7.1 (ref 7–25)
CALCIUM SPEC-SCNC: 9.3 MG/DL (ref 8.6–10.5)
CHLORIDE SERPL-SCNC: 103 MMOL/L (ref 98–107)
CO2 SERPL-SCNC: 29 MMOL/L (ref 22–29)
CREAT SERPL-MCNC: 1.44 MG/DL (ref 0.76–1.27)
DEPRECATED RDW RBC AUTO: 51.1 FL (ref 37–54)
EGFRCR SERPLBLD CKD-EPI 2021: 50.4 ML/MIN/1.73
EOSINOPHIL # BLD AUTO: 0.05 10*3/MM3 (ref 0–0.4)
EOSINOPHIL NFR BLD AUTO: 0.6 % (ref 0.3–6.2)
ERYTHROCYTE [DISTWIDTH] IN BLOOD BY AUTOMATED COUNT: 13.9 % (ref 12.3–15.4)
GLOBULIN UR ELPH-MCNC: 3.3 GM/DL
GLUCOSE SERPL-MCNC: 132 MG/DL (ref 65–99)
HCT VFR BLD AUTO: 38.9 % (ref 37.5–51)
HGB BLD-MCNC: 12.6 G/DL (ref 13–17.7)
IMM GRANULOCYTES # BLD AUTO: 0.01 10*3/MM3 (ref 0–0.05)
IMM GRANULOCYTES NFR BLD AUTO: 0.1 % (ref 0–0.5)
LYMPHOCYTES # BLD AUTO: 1.82 10*3/MM3 (ref 0.7–3.1)
LYMPHOCYTES NFR BLD AUTO: 20.6 % (ref 19.6–45.3)
MCH RBC QN AUTO: 32 PG (ref 26.6–33)
MCHC RBC AUTO-ENTMCNC: 32.4 G/DL (ref 31.5–35.7)
MCV RBC AUTO: 98.7 FL (ref 79–97)
MONOCYTES # BLD AUTO: 0.75 10*3/MM3 (ref 0.1–0.9)
MONOCYTES NFR BLD AUTO: 8.5 % (ref 5–12)
NEUTROPHILS NFR BLD AUTO: 6.18 10*3/MM3 (ref 1.7–7)
NEUTROPHILS NFR BLD AUTO: 70 % (ref 42.7–76)
PLATELET # BLD AUTO: 229 10*3/MM3 (ref 140–450)
PMV BLD AUTO: 8.8 FL (ref 6–12)
POTASSIUM SERPL-SCNC: 3.3 MMOL/L (ref 3.5–5.2)
PROT SERPL-MCNC: 7.3 G/DL (ref 6–8.5)
RBC # BLD AUTO: 3.94 10*6/MM3 (ref 4.14–5.8)
SODIUM SERPL-SCNC: 142 MMOL/L (ref 136–145)
WBC NRBC COR # BLD AUTO: 8.83 10*3/MM3 (ref 3.4–10.8)

## 2025-07-22 PROCEDURE — 1125F AMNT PAIN NOTED PAIN PRSNT: CPT | Performed by: INTERNAL MEDICINE

## 2025-07-22 PROCEDURE — 25010000002 HEPARIN LOCK FLUSH PER 10 UNITS: Performed by: INTERNAL MEDICINE

## 2025-07-22 PROCEDURE — 80053 COMPREHEN METABOLIC PANEL: CPT | Performed by: INTERNAL MEDICINE

## 2025-07-22 PROCEDURE — 36591 DRAW BLOOD OFF VENOUS DEVICE: CPT

## 2025-07-22 PROCEDURE — 3077F SYST BP >= 140 MM HG: CPT | Performed by: INTERNAL MEDICINE

## 2025-07-22 PROCEDURE — 99214 OFFICE O/P EST MOD 30 MIN: CPT | Performed by: INTERNAL MEDICINE

## 2025-07-22 PROCEDURE — 85025 COMPLETE CBC W/AUTO DIFF WBC: CPT | Performed by: INTERNAL MEDICINE

## 2025-07-22 PROCEDURE — 3079F DIAST BP 80-89 MM HG: CPT | Performed by: INTERNAL MEDICINE

## 2025-07-22 PROCEDURE — G2211 COMPLEX E/M VISIT ADD ON: HCPCS | Performed by: INTERNAL MEDICINE

## 2025-07-22 RX ORDER — SODIUM CHLORIDE 0.9 % (FLUSH) 0.9 %
10 SYRINGE (ML) INJECTION AS NEEDED
Status: DISCONTINUED | OUTPATIENT
Start: 2025-07-22 | End: 2025-07-23 | Stop reason: HOSPADM

## 2025-07-22 RX ORDER — SODIUM CHLORIDE 0.9 % (FLUSH) 0.9 %
10 SYRINGE (ML) INJECTION AS NEEDED
OUTPATIENT
Start: 2025-07-22

## 2025-07-22 RX ORDER — HEPARIN SODIUM (PORCINE) LOCK FLUSH IV SOLN 100 UNIT/ML 100 UNIT/ML
500 SOLUTION INTRAVENOUS AS NEEDED
OUTPATIENT
Start: 2025-07-22

## 2025-07-22 RX ORDER — HEPARIN SODIUM (PORCINE) LOCK FLUSH IV SOLN 100 UNIT/ML 100 UNIT/ML
500 SOLUTION INTRAVENOUS AS NEEDED
Status: DISCONTINUED | OUTPATIENT
Start: 2025-07-22 | End: 2025-07-23 | Stop reason: HOSPADM

## 2025-07-22 RX ADMIN — HEPARIN 500 UNITS: 100 SYRINGE at 13:14

## 2025-07-22 RX ADMIN — Medication 10 ML: at 13:14

## 2025-07-22 NOTE — PROGRESS NOTES
Hematology and Oncology [facility]  Office number [phone]    Fax number [phone]     Follow up     Date: 25    Patient Name: David Barfield  MRN: [id number]  : 1950    Referring Physician: Dr. Sampson    Chief Complaint: Nonsmall cell lung cancer follow-up on treatment    Cancer Staging:  Cancer Staging   Stage IIIA (cT2b, cN2, cM0)    History of Present Illness: David Barfield is a pleasant 75 y.o. male who presents today for evaluation of NSCLC. He has a 50 pack year smoking history.    He has a history of a PET avid subpleural RLL lung nodule [facility] [place] [facility]. This had an SUV of 9.8 on PET  in association with increased mediastinal LN uptake with SUV around 3. Imaging was felt secondary to osteophyte fibrosis. He did undergo bronchoscopy 2020 with negative cytology. The RLL lung nodule was not felt amenable to bronchoscopy biopsy.    CT lung screen 2023 showed:      PET CT showed mass in the RLL intensely SUV avid, max 17. Mediastinal LN not hypermetabolic above baseline.     Right lower lobe robotic transbronchial biopsy and cytology on 9/15/2023 showed benign findings. Cultures including AFT and fungal were negative.  Station 11L, Station 4L LN negative  Station 7 LN showed NSCLCa (positive for cytokeratin 7, p63, and TTF-1 with no significant staining for p40 or Napsin. The staining pattern raises the possibility of mixed adenocarcinoma and squamous cell carcinoma differentiation in this tumor). PDL1 negative.    Tempus negative for targetable mutations on liquid biopsy. QNS on tissue at diagnosis.    MRI brain was negative.    He has a history of sick sinus syndrome s/p PPM and moderate COPD. He describes only mild physical limitations from this. For example he recently walked 1.5 miles at [facility]. At home, he climbs 17 steps without issue. He does sit down with long activities.     Following neoadjuvant chemoimmunotherapy  he underwent lobectomy and mediastinal lymph node dissection on January 9, 2024.  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  Margins were negative.  Regional lymph nodes including 4R, 12 R, and 7 were negative as were 5 intralobar lymph nodes.    Treatment history:  Carbo, taxol, nivo, C1 10/24/23; C2 11/15/23; C3 11/21/23  Atezolizumab maintenance, cycle 1 2/6/2024; cycle 2 2/27/24: stopped for intolerance (nausea, malaise, poor QOL) but no severe immune events  Opdivo maintenance, cycle 1: 4/4/24: terminated for ARACELI    Interval history:   Mr. Barfield is here in the company of his significant other for follow up.   He has been feeling well.  Chest wall pain stable since surgery, no new complaints    Past Medical History:   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    Coronary artery disease 2019    Diabetes mellitus Borderline    Emphysema/COPD     Erectile disorder     GERD (gastroesophageal reflux disease)     History of chemotherapy     Hyperlipidemia     Hypertension 2019    Lung nodule     Mumps     Mumps     Pruritus     after bath    Slow to wake up after anesthesia     Stage 5 chronic kidney disease not on chronic dialysis 05/14/2024    Wears dentures     upper only    Wears hearing aid in both ears     usually only wears right   No personal history of myocardial infarction, cerebrovascular event, or venous thromboembolism.  No autoimmune diseases.   No prior cscope, mailed cologuard recently    Past Surgical History:   Past Surgical History:   Procedure Laterality Date    BONE BIOPSY      broken bone surgery in his face    BRONCHOSCOPY THORACOTOMY Right 01/09/2024    Procedure: THORACOTOMY FOR LOWER LOBECTOMY AND MEDISTINAL LYMPH NODE DISSECTION RIGHT;  Surgeon:  Joey Patel MD;  Location:  [facility];  Service: Cardiothoracic;  Laterality: Right;    BRONCHOSCOPY WITH ION ROBOTIC ASSIST N/A 09/15/2023    Procedure: BRONCHOSCOPY NAVIGATION WITH ENDOBRONCHIAL ULTRASOUND AND ION ROBOT;  Surgeon: Octaviano Sampson MD;  Location:  [facility];  Service: Robotics - Pulmonary;  Laterality: N/A;  ion #6 - 0032  - 0015  Cath guide 0061    EBUS balloon removed and intact    CARDIAC ELECTROPHYSIOLOGY PROCEDURE N/A 08/17/2021    Procedure: Pacemaker DC new;  Surgeon: Kayy Box MD;  Location:  [facility];  Service: Cardiology;  Laterality: N/A;    FACIAL FRACTURE SURGERY      LYMPH NODE BIOPSY  2023    PACEMAKER IMPLANTATION       Family History:   Family History   Problem Relation Age of Onset    Aneurysm Mother         brain    Dementia Father     Leukemia Sister     Cancer Sister     Heart disease Paternal Grandmother     Hypertension Paternal Grandfather     Breast cancer Neg Hx     Ovarian cancer Neg Hx    Sister leukemia at age 21  No other malignancy    Social History:   Social History     Socioeconomic History    Marital status: Single    Number of children: 3   Tobacco Use    Smoking status: Every Day     Current packs/day: 0.50     Average packs/day: 0.5 packs/day for 57.6 years (29.3 ttl pk-yrs)     Types: Cigarettes     Start date: 1/1/1968     Passive exposure: Current    Smokeless tobacco: Never    Tobacco comments:     Still smoke   Vaping Use    Vaping status: Never Used   Substance and Sexual Activity    Alcohol use: Never    Drug use: Never    Sexual activity: Yes     Partners: Female     Birth control/protection: None   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, [place]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 OTC, Disp: , Rfl:     ferrous sulfate 325 (65 FE) MG tablet, Take 1 tablet by mouth Daily With Breakfast. OTC, Disp: , Rfl:     fexofenadine (Allegra Allergy) 180 MG tablet, Take 1 tablet by mouth Daily., Disp: 90 tablet, Rfl: 1    fludrocortisone 0.1 MG tablet, Take 1 tablet by mouth Daily., Disp: 90 tablet, Rfl: 1    fluticasone (VERAMYST) 27.5 MCG/SPRAY nasal spray, Administer 2 sprays into the nostril(s) as directed by provider 1 (One) Time As Needed for Rhinitis or Allergies., Disp: 6 mL, Rfl: 2    Fluticasone-Umeclidin-Vilant (Trelegy Ellipta) 100-62.5-25 MCG/ACT inhaler, Inhale 1 puff Daily., Disp: 3 each, Rfl: 3    lidocaine-prilocaine (EMLA) 2.5-2.5 % cream, Apply 1 application  topically to the appropriate area as directed As Needed (45-60 minutes prior to port access.  Cover with saran/plastic wrap.)., Disp: 30 g, Rfl: 3    magnesium chloride ER 64 MG DR tablet, Take 1 tablet by mouth Daily., Disp: , Rfl:     midodrine (PROAMATINE) 10 MG tablet, Take 1 tablet by mouth 3 (Three) Times a Day As Needed (Systolic BP less then 100)., Disp: 30 tablet, Rfl: 0    omeprazole (priLOSEC) 40 MG capsule, Take 1 capsule by mouth Daily., Disp: 30 capsule, Rfl: 5    ondansetron (ZOFRAN) 8 MG tablet, Take 1 tablet by mouth 3 (Three) Times a Day As Needed for Nausea or Vomiting., Disp: 30 tablet, Rfl: 2    rosuvastatin (CRESTOR) 40 MG tablet, TAKE 1 TABLET BY MOUTH DAILY, Disp: 90 tablet, Rfl: 3    sodium bicarbonate 650 MG tablet, Take 2 tablets by mouth 3 times a day., Disp: , Rfl:     VITAMIN A OP, Apply  to eye(s) as directed by provider., Disp: , Rfl:     VITAMIN D PO, Take  by mouth., Disp: , Rfl:   No current facility-administered medications for this visit.    Facility-Administered  "Medications Ordered in Other Visits:     heparin injection 500 Units, 500 Units, Intravenous, PRN, Neetu Ashley MD, 500 Units at 07/22/25 1314    sodium chloride 0.9 % flush 10 mL, 10 mL, Intravenous, PRN, Neetu Ashley MD, 10 mL at 07/22/25 1314    Allergies:   Allergies   Allergen Reactions    Cymbalta [Duloxetine Hcl] GI Intolerance    Gabapentin Mental Status Change     Pt states that this medication \"makes him feel foolish in his head\".     Remeron [Mirtazapine] Other (See Comments)     Excess sedation    Toradol [Ketorolac Tromethamine] GI Intolerance     Projectile vomiting     Latex Other (See Comments)     Latex allergy     Tape Rash       Objective     Vital Signs:   Vitals:    07/22/25 1328   BP: 144/84   Pulse: 73   Resp: 16   Temp: 97.3 °F (36.3 °C)   TempSrc: Temporal   SpO2: 97%   Weight: 85.7 kg (189 lb)   Height: 182.9 cm (72\")   PainSc: 1       Body mass index is 25.63 kg/m².   Pain Score    07/22/25 1328   PainSc: 1          ECOG Performance Status: 1 - Symptomatic but completely ambulatory    Physical Exam:   General: No acute distress.   HEENT: Normocephalic, atraumatic. Sclera anicteric.   Neck: supple, no adenopathy.   Cardiovascular: RRR no murmurs  Respiratory: Clear to auscultation bilaterally.  Abdomen: Soft, nontender, non distended  Lymph: no cervical, supraclavicular adenopathy  Neuro: Alert and oriented x 3. No focal deficits.   Ext: Symmetric, no swelling.   Skin: no rash.  Accurate 7/2025    Laboratory/Imaging Reviewed:   Hospital Outpatient Visit on 07/22/2025   Component Date Value Ref Range Status    Glucose 07/22/2025 132 (H)  65 - 99 mg/dL Final    BUN 07/22/2025 10.2  8.0 - 23.0 mg/dL Final    Creatinine 07/22/2025 1.44 (H)  0.76 - 1.27 mg/dL Final    Sodium 07/22/2025 142  136 - 145 mmol/L Final    Potassium 07/22/2025 3.3 (L)  3.5 - 5.2 mmol/L Final    Chloride 07/22/2025 103  98 - 107 mmol/L Final    CO2 07/22/2025 29.0  22.0 - 29.0 mmol/L Final    Calcium 07/22/2025 " 9.3  8.6 - 10.5 mg/dL Final    Total Protein 07/22/2025 7.3  6.0 - 8.5 g/dL Final    Albumin 07/22/2025 4.0  3.5 - 5.2 g/dL Final    ALT (SGPT) 07/22/2025 9  1 - 41 U/L Final    AST (SGOT) 07/22/2025 17  1 - 40 U/L Final    Alkaline Phosphatase 07/22/2025 101  39 - 117 U/L Final    Total Bilirubin 07/22/2025 0.2  0.0 - 1.2 mg/dL Final    Globulin 07/22/2025 3.3  gm/dL Final    Calculated Result    A/G Ratio 07/22/2025 1.2  g/dL Final    BUN/Creatinine Ratio 07/22/2025 7.1  7.0 - 25.0 Final    Anion Gap 07/22/2025 10.0  5.0 - 15.0 mmol/L Final    eGFR 07/22/2025 50.4 (L)  >60.0 mL/min/1.73 Final    WBC 07/22/2025 8.83  3.40 - 10.80 10*3/mm3 Final    RBC 07/22/2025 3.94 (L)  4.14 - 5.80 10*6/mm3 Final    Hemoglobin 07/22/2025 12.6 (L)  13.0 - 17.7 g/dL Final    Hematocrit 07/22/2025 38.9  37.5 - 51.0 % Final    MCV 07/22/2025 98.7 (H)  79.0 - 97.0 fL Final    MCH 07/22/2025 32.0  26.6 - 33.0 pg Final    MCHC 07/22/2025 32.4  31.5 - 35.7 g/dL Final    RDW 07/22/2025 13.9  12.3 - 15.4 % Final    RDW-SD 07/22/2025 51.1  37.0 - 54.0 fl Final    MPV 07/22/2025 8.8  6.0 - 12.0 fL Final    Platelets 07/22/2025 229  140 - 450 10*3/mm3 Final    Neutrophil % 07/22/2025 70.0  42.7 - 76.0 % Final    Lymphocyte % 07/22/2025 20.6  19.6 - 45.3 % Final    Monocyte % 07/22/2025 8.5  5.0 - 12.0 % Final    Eosinophil % 07/22/2025 0.6  0.3 - 6.2 % Final    Basophil % 07/22/2025 0.2  0.0 - 1.5 % Final    Immature Grans % 07/22/2025 0.1  0.0 - 0.5 % Final    Neutrophils, Absolute 07/22/2025 6.18  1.70 - 7.00 10*3/mm3 Final    Lymphocytes, Absolute 07/22/2025 1.82  0.70 - 3.10 10*3/mm3 Final    Monocytes, Absolute 07/22/2025 0.75  0.10 - 0.90 10*3/mm3 Final    Eosinophils, Absolute 07/22/2025 0.05  0.00 - 0.40 10*3/mm3 Final    Basophils, Absolute 07/22/2025 0.02  0.00 - 0.20 10*3/mm3 Final    Immature Grans, Absolute 07/22/2025 0.01  0.00 - 0.05 10*3/mm3 Final   Hospital Outpatient Visit on 07/16/2025   Component Date Value Ref Range  Status    Glucose 07/16/2025 100  70 - 130 mg/dL Final   Orders Only on 07/09/2025   Component Date Value Ref Range Status    Date Time Interrogation Session 07/09/2025 [id number]   Preliminary    Type Interrogation Session 07/09/2025 Remote Scheduled   Preliminary    Implantable Pulse Generator Manufa* 07/09/2025 [employer]   Preliminary    Implantable Pulse Generator Type 07/09/2025 IPG   Preliminary    Implantable Pulse Generator Model 07/09/2025 L311   Preliminary    Implantable Pulse Generator Serial* 07/09/2025 [id number]   Preliminary    Implantable Pulse Generator Implan* 07/09/2025 [id number]   Preliminary    Battery Remaining Percentage 07/09/2025 100.00  % Preliminary    Battery Remaining Longevity 07/09/2025 66.0  mo Preliminary    Battery Status 07/09/2025 Beginning of Service   Preliminary    Gil Statistic RA Percent Paced 07/09/2025 98.00   Preliminary    Gil Statistic RV Percent Paced 07/09/2025 2.00   Preliminary    Atrial Tachy Statistic AT/AF Burde* 07/09/2025 0.00   Preliminary    Lead Channel Setting RA Sensing Se* 07/09/2025 0.25   Preliminary    Lead Channel RA Impedance Value 07/09/2025 721   Preliminary    Lead Channel RA Measurements Date * 07/09/2025 [id number]   Preliminary    Lead Channel Setting RA Pacing Amp* 07/09/2025 2.000   Preliminary    Lead Channel Setting RA Pacing Pul* 07/09/2025 0.4   Preliminary    Lead Channel RV Sensing Intrinsic * 07/09/2025 6.200   Preliminary    Lead Channel Setting RV Sensing Se* 07/09/2025 0.60   Preliminary    Lead Channel RV Impedance Value 07/09/2025 708   Preliminary    Lead Channel RV Measurements Date * 07/09/2025 [id number]   Preliminary    Lead Channel Setting RV Pacing Amp* 07/09/2025 2.000   Preliminary    Lead Channel Setting RV Pacing Pul* 07/09/2025 0.4   Preliminary    Gil Setting Mode (NBG Code) 07/09/2025 DDDR   Preliminary    Gil Setting Lower Rate Limit 07/09/2025 70   Preliminary    Gil Setting AT Mode Switch Rate  07/09/2025 170   Preliminary    Gil Setting Maximum Tracking Rate 07/09/2025 130   Preliminary    Gil Setting Maximum Sensor Rate 07/09/2025 130   Preliminary    Gil Setting PAV Delay 07/09/2025 220   Preliminary    Gil Setting DESIREE Delay 07/09/2025 220   Preliminary    Lead Channel Setting RA Sensing Po* 07/09/2025 Bipolar   Preliminary    Lead Channel Setting RV Sensing Po* 07/09/2025 Bipolar   Preliminary    Lead Channel Setting RA Pacing Ja* 07/09/2025 Bipolar   Preliminary    Lead Channel Setting RV Pacing Ja* 07/09/2025 Bipolar   Preliminary    Lead Channel RA Pacing Threshold  * 07/09/2025 Bipolar   Preliminary    Lead Channel RV Pacing Threshold  * 07/09/2025 Bipolar   Preliminary    Zone Setting Type Category 07/09/2025 VT   Preliminary    IDC RATE 1 07/09/2025 160   Preliminary    Zone Setting Status 07/09/2025 Monitor   Preliminary    Zone ID 07/09/2025 1   Preliminary       NM PET/CT Skull Base to Mid Thigh  Result Date: 7/21/2025  Narrative: FDG NM PET/CT SKULL BASE TO MID THIGH Date of Exam: 7/16/2025 10:15 AM EDT Indication: Previous right lung cancer. Comparison: 4/18/2021 whole-body PET/CT scan Technique: 13.6 mCi of F-18 FDG was administered intravenously. PET imaging was obtained from skull base to mid-thigh approximately 60 minutes after radiotracer injection. A low dose non contrast CT was obtained for attenuation correction and anatomic localization. Fused PET-CT and 3D MIP reconstructions were utilized for image interpretation.  Fasting blood glucose level: 100 mg/dl. Reference uptake values: Mediastinum: 2.0 SUVmax Liver: 2.9 SUVmax Normalization method: Body Weight Findings: Prior study from 4/18/2025 by report showed no evidence of recurrent disease. Changes of previous right lower lobectomy. Probably infectious or inflammatory process with low level uptake in the right upper lobe. Focal proximal sigmoid uptake. Today's 3D images generally appear to show expected distribution  pattern of radiotracer and no clearly abnormal focus of radiotracer uptake.. Multiplanar images show no significant abnormality of radiotracer uptake in the head or neck. In the chest, no evidence of hypermetabolic mediastinal, hilar, axillary or pulmonary parenchymal disease is seen. Below the diaphragm, no evidence of hypermetabolic liver, adrenal, or other solid organ disease is seen. There is typical GI and  tract uptake. Focal uptake previously described in the sigmoid is not identified today. No abnormal marrow space uptake is  seen.     Impression: Impression: Negative PET scan. No evidence of recurrent malignancy/metastasis. Electronically Signed: Baljeet Carlos MD  7/21/2025 2:32 PM EDT  Workstation ID: [id number]      Procedures    Assessment / Plan      Assessment/Plan:     Nonsmall cell lung cancer of the RLL, Stage IIIA  Indeterminate LN in the mediastinum  3.   Nonspecific groundglass opacity within the inferior aspect of the right upper lobe   -He has an enlarging RLL nodule with SUV of 17. Despite negative transbronchial biopsy, he was found to have N2 disease with a positive level 7 LN. We discussed options for definitive treatment to include induction chemo immunotherapy followed by surgical resection, chemoradiation followed by surgical resection, or definitive chemoradiation. We discussed differences in schedules and side effects. He has no contraindications to immunotherapy and I recommended nivolumab + chemotherapy induction.  His case was reviewed at multidisciplinary tumor board including thoracic surgery.  He was felt to be a good candidate for neoadjuvant chemo immunotherapy followed by resection assessment.  He completed 3 cycles of  nivolumab, carboplatin, paclitaxel x3 cycles in a neoadjuvant fashion.  He underwent right lower lobectomy and lymph node dissection.  -Final pathology reviewed and shows 1.4 cm of residual disease with extensive treatment effect.  The previously biopsied lymph  node was negative on mediastinal dissection. Because his original bronchoscopy specimen was QNS for Tempus tissue testing, I ordered repeat Tempus on the resection specimen to rule out any EGFR or ALK mutation.  There was no mutation on liquid biopsy.  -His case was reviewed at multidisciplinary tumor board and consensus was to proceed with adjuvant immunotherapy without any indication for further chemotherapy or adjuvant radiation.  We proceeded with atezolizumab per the [id number] regimen as per NCCN guidelines. He received 2 cycles of adjuvant immunotherapy with atezolizumab.  He reports substantial treatment related side effects.  He has not experienced any severe immunotherapy related adverse events that would necessitate cessation of all immunotherapy drugs.  However he has had intolerable side effects on atezolizumab including severe nausea and fatigue that last for a week and impair his quality of life, such that he does not feel he can continue with the atezolizumab treatments.  Because he had an excellent response to chemoimmunotherapy with the use of nivolumab, because he cannot continue the atezolizumab due to side effects, and because he did not have any severe immune related adverse events that would be considered a contraindication to further immunotherapy, I recommended that we proceed with adjuvant nivolumab.  He then had recurrent admissions with infections, but also recurrent hypotension, ARACELI. He is doing better. We omitted further immunotherapy  -PET CT from 7/2025 personally reviewed and compared to prior. No concerning findings. Will decrease frequency of imaging to q 4-6 mo.   -He is willing to schedule a brain MRI in 4 mo.   -CBC/CMP stable, CTM  Orders Placed This Encounter   Procedures    MRI Brain With & Without Contrast    NM PET/CT Skull Base to Mid Thigh      4.  Adrenal insufficiency   -On fludrocortisone.      5. CKD  -Following with nephrology  - Creatinine stable    6.   Anemia  -Mild and stable.  He is taking several vitamin supplements including iron.   -Repeat labs to include:  No orders of the defined types were placed in this encounter.  -If they desire definitive diagnosis we can proceed with a bone marrow biopsy, but given overall stability and mild nature of anemia he prefers to avoid invasive procedures.  - CBC stable today, ctm    7.  Asymmetric sigmoid colon uptake  - Referral for colonoscopy previously made, no abnormal uptake today    Follow-up   4 mo     Neetu Ashley MD  Hematology and Oncology

## 2025-07-28 ENCOUNTER — OFFICE VISIT (OUTPATIENT)
Dept: ENDOCRINOLOGY | Facility: CLINIC | Age: 75
End: 2025-07-28
Payer: MEDICARE

## 2025-07-28 VITALS
DIASTOLIC BLOOD PRESSURE: 97 MMHG | OXYGEN SATURATION: 98 % | WEIGHT: 190.8 LBS | BODY MASS INDEX: 25.84 KG/M2 | SYSTOLIC BLOOD PRESSURE: 177 MMHG | HEIGHT: 72 IN | HEART RATE: 83 BPM

## 2025-07-28 DIAGNOSIS — I95.9 HYPOTENSION, UNSPECIFIED HYPOTENSION TYPE: Primary | ICD-10-CM

## 2025-07-28 DIAGNOSIS — I15.8 OTHER SECONDARY HYPERTENSION: ICD-10-CM

## 2025-07-28 PROBLEM — I10 BP (HIGH BLOOD PRESSURE): Status: ACTIVE | Noted: 2025-07-28

## 2025-07-28 PROCEDURE — 3080F DIAST BP >= 90 MM HG: CPT | Performed by: INTERNAL MEDICINE

## 2025-07-28 PROCEDURE — 1160F RVW MEDS BY RX/DR IN RCRD: CPT | Performed by: INTERNAL MEDICINE

## 2025-07-28 PROCEDURE — 1159F MED LIST DOCD IN RCRD: CPT | Performed by: INTERNAL MEDICINE

## 2025-07-28 PROCEDURE — 99204 OFFICE O/P NEW MOD 45 MIN: CPT | Performed by: INTERNAL MEDICINE

## 2025-07-28 PROCEDURE — 3077F SYST BP >= 140 MM HG: CPT | Performed by: INTERNAL MEDICINE

## 2025-07-28 NOTE — PROGRESS NOTES
"David Thayer Lico 75 y.o.  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, resection and immunotherapy   Had ARACELI associated with immunotherapy and was seen at hospital with multiple admissions for low bp   During visit 5/23/24 had cosyntropin stim test showing baseline cortisol 15, stim to 23   He was seen by nephrology and he was started on proamatine and fludrocortisone, salt tablets to help maintain bp   He no longer takes salt tablets or proamatine  He is taking fludrocortisone 1 pill (0.1 mg) daily   He is very active, working outside for 7-8 hours a day 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     Remeron [Mirtazapine] Other (See Comments)     Excess sedation    Toradol [Ketorolac Tromethamine] GI Intolerance     Projectile vomiting     Latex Other (See Comments)     Latex allergy     Tape Rash       Current Outpatient Medications:     albuterol sulfate  (90 Base) MCG/ACT inhaler, Inhale 2 puffs Every 4 (Four) Hours As Needed for Wheezing., Disp: 18 g, Rfl: 11    Ascorbic Acid (VITAMIN C PO), Take  by mouth., Disp: , Rfl:     aspirin 81 MG EC tablet, Take 1 tablet by mouth Daily for 360 days., Disp: 90 tablet, Rfl: 3    B Complex Vitamins (vitamin b complex) capsule capsule, Take 1 capsule by mouth Daily. OTC, Disp: , Rfl:     ferrous sulfate 325 (65 FE) MG tablet, Take 1 tablet by mouth Daily With Breakfast. OTC, Disp: , Rfl:     fexofenadine (Allegra Allergy) 180 MG tablet, Take 1 tablet by mouth Daily., Disp: 90 tablet, Rfl: 1    " fludrocortisone 0.1 MG tablet, Take 1 tablet by mouth Daily., Disp: 90 tablet, Rfl: 1    fluticasone (VERAMYST) 27.5 MCG/SPRAY nasal spray, Administer 2 sprays into the nostril(s) as directed by provider 1 (One) Time As Needed for Rhinitis or Allergies., Disp: 6 mL, Rfl: 2    Fluticasone-Umeclidin-Vilant (Trelegy Ellipta) 100-62.5-25 MCG/ACT inhaler, Inhale 1 puff Daily., Disp: 3 each, Rfl: 3    lidocaine-prilocaine (EMLA) 2.5-2.5 % cream, Apply 1 application  topically to the appropriate area as directed As Needed (45-60 minutes prior to port access.  Cover with saran/plastic wrap.)., Disp: 30 g, Rfl: 3    magnesium chloride ER 64 MG DR tablet, Take 1 tablet by mouth Daily., Disp: , Rfl:     midodrine (PROAMATINE) 10 MG tablet, Take 1 tablet by mouth 3 (Three) Times a Day As Needed (Systolic BP less then 100)., Disp: 30 tablet, Rfl: 0    omeprazole (priLOSEC) 40 MG capsule, Take 1 capsule by mouth Daily., Disp: 30 capsule, Rfl: 5    ondansetron (ZOFRAN) 8 MG tablet, Take 1 tablet by mouth 3 (Three) Times a Day As Needed for Nausea or Vomiting., Disp: 30 tablet, Rfl: 2    rosuvastatin (CRESTOR) 40 MG tablet, TAKE 1 TABLET BY MOUTH DAILY, Disp: 90 tablet, Rfl: 3    sodium bicarbonate 650 MG tablet, Take 2 tablets by mouth 3 times a day., Disp: , Rfl:     VITAMIN A OP, Apply  to eye(s) as directed by provider., Disp: , Rfl:     VITAMIN D PO, Take  by mouth., Disp: , Rfl:     Patient Active Problem List    Diagnosis     BP (high blood pressure) [I10]     Hypotension [I95.9]     Dehydration [E86.0]     C. difficile colitis [A04.72]     UTI (urinary tract infection) [N39.0]     Hx of hypotension [Z86.79]     Weakness [R53.1]     Moderate malnutrition [E44.0]     Hypotension [I95.9]     Stage 4 chronic kidney disease [N18.4]     CAD (coronary artery disease) [I25.10]     Leukocytosis [D72.829]     Severe malnutrition [E43]     ARACELI (acute kidney injury) [N17.9]     Hypokalemia [E87.6]     Septic shock [A41.9, R65.21]      Acute pyelonephritis [N10]     Primary hypertension [I10]     GERD without esophagitis [K21.9]     Bronchogenic cancer of right lung [C34.91]     Malignant neoplasm of lower lobe of right lung [C34.31]     Centrilobular emphysema [J43.2]     Nodule of lower lobe of right lung [R91.1]     Mediastinal adenopathy [R59.0]     Cough [R05.9]     Tobacco abuse [Z72.0]     Presence of cardiac pacemaker [Z95.0]     Mixed hyperlipidemia [E78.2]     High degree atrioventricular block [I44.39]     Bradycardia, sinus [R00.1]     Chronic hypotension [I95.89]     PVC's (premature ventricular contractions) [I49.3]      Review of Systems   Constitutional:  Positive for activity change and fatigue. Negative for appetite change and unexpected weight change.   HENT:  Positive for hearing loss. Negative for congestion and rhinorrhea.    Eyes:  Negative for visual disturbance.   Respiratory:  Positive for shortness of breath and wheezing. Negative for cough.    Cardiovascular:  Negative for palpitations and leg swelling.   Gastrointestinal:  Positive for abdominal distention. Negative for constipation, diarrhea and nausea.   Endocrine: Positive for cold intolerance and polydipsia.   Genitourinary:  Positive for urgency. Negative for hematuria.   Musculoskeletal:  Positive for myalgias. Negative for arthralgias, back pain, gait problem and joint swelling.   Skin:  Negative for color change, rash and wound.   Allergic/Immunologic: Negative for environmental allergies, food allergies and immunocompromised state.   Neurological:  Positive for dizziness, syncope, weakness and light-headedness.   Hematological:  Positive for adenopathy. Bruises/bleeds easily.   Psychiatric/Behavioral:  Negative for confusion, decreased concentration, dysphoric mood and sleep disturbance. The patient is not nervous/anxious.      Social History     Socioeconomic History    Marital status: Significant Other    Number of children: 3   Tobacco Use    Smoking  "status: Every Day     Current packs/day: 0.50     Average packs/day: 0.5 packs/day for 57.6 years (29.3 ttl pk-yrs)     Types: Cigarettes     Start date: 1/1/1968     Passive exposure: Current    Smokeless tobacco: Never    Tobacco comments:     Still smoke   Vaping Use    Vaping status: Never Used   Substance and Sexual Activity    Alcohol use: Never    Drug use: Never    Sexual activity: Yes     Partners: Female     Birth control/protection: None     Family History   Problem Relation Age of Onset    Aneurysm Mother         brain    Dementia Father     Leukemia Sister     Cancer Sister     Heart disease Paternal Grandmother     Hypertension Paternal Grandfather     Breast cancer Neg Hx     Ovarian cancer Neg Hx      /97 (BP Location: Left arm, Patient Position: Sitting, Cuff Size: Adult)   Pulse 83   Ht 182.9 cm (72.01\")   Wt 86.5 kg (190 lb 12.8 oz)   SpO2 98%   BMI 25.87 kg/m²   Physical Exam  Vitals and nursing note reviewed.   Constitutional:       Appearance: Normal appearance. He is well-developed.   HENT:      Head: Normocephalic and atraumatic.   Eyes:      General: Lids are normal.      Conjunctiva/sclera: Conjunctivae normal.      Pupils: Pupils are equal, round, and reactive to light.   Neck:      Thyroid: No thyroid mass or thyromegaly.      Vascular: No carotid bruit.      Trachea: Trachea normal. No tracheal deviation.   Cardiovascular:      Rate and Rhythm: Normal rate and regular rhythm.      Heart sounds: Normal heart sounds. No murmur heard.     No friction rub. No gallop.   Pulmonary:      Effort: Pulmonary effort is normal. No respiratory distress.      Breath sounds: Normal breath sounds. No wheezing.   Musculoskeletal:         General: No deformity. Normal range of motion.      Cervical back: Normal range of motion and neck supple.   Lymphadenopathy:      Cervical: No cervical adenopathy.   Skin:     General: Skin is warm and dry.      Findings: No erythema or rash.      Nails: " There is no clubbing.   Neurological:      Mental Status: He is alert and oriented to person, place, and time.      Cranial Nerves: No cranial nerve deficit.      Deep Tendon Reflexes: Reflexes are normal and symmetric. Reflexes normal.   Psychiatric:         Speech: Speech normal.         Behavior: Behavior normal.         Thought Content: Thought content normal.         Judgment: Judgment normal.       Results for orders placed or performed during the hospital encounter of 07/22/25   Comprehensive Metabolic Panel    Collection Time: 07/22/25  1:06 PM    Specimen: Blood   Result Value Ref Range    Glucose 132 (H) 65 - 99 mg/dL    BUN 10.2 8.0 - 23.0 mg/dL    Creatinine 1.44 (H) 0.76 - 1.27 mg/dL    Sodium 142 136 - 145 mmol/L    Potassium 3.3 (L) 3.5 - 5.2 mmol/L    Chloride 103 98 - 107 mmol/L    CO2 29.0 22.0 - 29.0 mmol/L    Calcium 9.3 8.6 - 10.5 mg/dL    Total Protein 7.3 6.0 - 8.5 g/dL    Albumin 4.0 3.5 - 5.2 g/dL    ALT (SGPT) 9 1 - 41 U/L    AST (SGOT) 17 1 - 40 U/L    Alkaline Phosphatase 101 39 - 117 U/L    Total Bilirubin 0.2 0.0 - 1.2 mg/dL    Globulin 3.3 gm/dL    A/G Ratio 1.2 g/dL    BUN/Creatinine Ratio 7.1 7.0 - 25.0    Anion Gap 10.0 5.0 - 15.0 mmol/L    eGFR 50.4 (L) >60.0 mL/min/1.73   CBC Auto Differential    Collection Time: 07/22/25  1:06 PM    Specimen: Blood   Result Value Ref Range    WBC 8.83 3.40 - 10.80 10*3/mm3    RBC 3.94 (L) 4.14 - 5.80 10*6/mm3    Hemoglobin 12.6 (L) 13.0 - 17.7 g/dL    Hematocrit 38.9 37.5 - 51.0 %    MCV 98.7 (H) 79.0 - 97.0 fL    MCH 32.0 26.6 - 33.0 pg    MCHC 32.4 31.5 - 35.7 g/dL    RDW 13.9 12.3 - 15.4 %    RDW-SD 51.1 37.0 - 54.0 fl    MPV 8.8 6.0 - 12.0 fL    Platelets 229 140 - 450 10*3/mm3    Neutrophil % 70.0 42.7 - 76.0 %    Lymphocyte % 20.6 19.6 - 45.3 %    Monocyte % 8.5 5.0 - 12.0 %    Eosinophil % 0.6 0.3 - 6.2 %    Basophil % 0.2 0.0 - 1.5 %    Immature Grans % 0.1 0.0 - 0.5 %    Neutrophils, Absolute 6.18 1.70 - 7.00 10*3/mm3    Lymphocytes,  Absolute 1.82 0.70 - 3.10 10*3/mm3    Monocytes, Absolute 0.75 0.10 - 0.90 10*3/mm3    Eosinophils, Absolute 0.05 0.00 - 0.40 10*3/mm3    Basophils, Absolute 0.02 0.00 - 0.20 10*3/mm3    Immature Grans, Absolute 0.01 0.00 - 0.05 10*3/mm3     Diagnoses and all orders for this visit:    1. Hypotension, unspecified hypotension type (Primary)  Assessment & Plan:  Associated with ARACELI and immunotherapy - now has stopped immunotherapy and is being monitored  Last K3.3 and na 142  PET scan with neg adrenal glands bilaterally  High BP today, denies dizziness or lightheadedness  Reviewed stim test, sufficient  Reduce fludrocortisone to 1/2 pill (0.05 mg) daily and follow up with electrolytes and bp check in 3-4 months - will wean med to off as tolerated   Not using proamatine or salt tablets currently   Recent lab work reviewed       2. Other secondary hypertension  Assessment & Plan:  Likely due to fludro- try weaning/tapering and monitor       Return in about 4 months (around 11/28/2025) for Recheck.    Electronically signed by: Vannessa Garcia MD

## 2025-07-28 NOTE — ASSESSMENT & PLAN NOTE
Associated with ARACELI and immunotherapy - now has stopped immunotherapy and is being monitored  Last K3.3 and na 142  PET scan with neg adrenal glands bilaterally  High BP today, denies dizziness or lightheadedness  Reviewed stim test, sufficient  Reduce fludrocortisone to 1/2 pill (0.05 mg) daily and follow up with electrolytes and bp check in 3-4 months - will wean med to off as tolerated   Not using proamatine or salt tablets currently   Recent lab work reviewed

## 2025-08-25 ENCOUNTER — RESULTS FOLLOW-UP (OUTPATIENT)
Dept: PULMONOLOGY | Facility: CLINIC | Age: 75
End: 2025-08-25

## 2025-08-25 ENCOUNTER — OFFICE VISIT (OUTPATIENT)
Dept: PULMONOLOGY | Facility: CLINIC | Age: 75
End: 2025-08-25
Payer: MEDICARE

## 2025-08-25 VITALS
SYSTOLIC BLOOD PRESSURE: 156 MMHG | BODY MASS INDEX: 25.47 KG/M2 | TEMPERATURE: 98.6 F | RESPIRATION RATE: 16 BRPM | WEIGHT: 188 LBS | DIASTOLIC BLOOD PRESSURE: 102 MMHG | HEIGHT: 72 IN | OXYGEN SATURATION: 97 % | HEART RATE: 71 BPM

## 2025-08-25 DIAGNOSIS — J44.89 COPD WITH ASTHMA: ICD-10-CM

## 2025-08-25 DIAGNOSIS — Z72.0 TOBACCO ABUSE: ICD-10-CM

## 2025-08-25 DIAGNOSIS — J44.9 CHRONIC OBSTRUCTIVE PULMONARY DISEASE, UNSPECIFIED COPD TYPE: Primary | ICD-10-CM

## 2025-08-25 DIAGNOSIS — J43.2 CENTRILOBULAR EMPHYSEMA: ICD-10-CM

## 2025-08-25 DIAGNOSIS — C34.31 MALIGNANT NEOPLASM OF LOWER LOBE OF RIGHT LUNG: ICD-10-CM

## 2025-08-25 PROCEDURE — 1159F MED LIST DOCD IN RCRD: CPT | Performed by: INTERNAL MEDICINE

## 2025-08-25 PROCEDURE — 99214 OFFICE O/P EST MOD 30 MIN: CPT | Performed by: INTERNAL MEDICINE

## 2025-08-25 PROCEDURE — 3080F DIAST BP >= 90 MM HG: CPT | Performed by: INTERNAL MEDICINE

## 2025-08-25 PROCEDURE — 1160F RVW MEDS BY RX/DR IN RCRD: CPT | Performed by: INTERNAL MEDICINE

## 2025-08-25 PROCEDURE — 3077F SYST BP >= 140 MM HG: CPT | Performed by: INTERNAL MEDICINE

## 2025-08-25 PROCEDURE — 94060 EVALUATION OF WHEEZING: CPT | Performed by: INTERNAL MEDICINE

## 2025-08-25 RX ORDER — FLUTICASONE FUROATE, UMECLIDINIUM BROMIDE AND VILANTEROL TRIFENATATE 100; 62.5; 25 UG/1; UG/1; UG/1
1 POWDER RESPIRATORY (INHALATION)
Qty: 3 EACH | Refills: 3 | Status: SHIPPED | OUTPATIENT
Start: 2025-08-25

## 2025-08-25 RX ORDER — ALBUTEROL SULFATE 90 UG/1
2 INHALANT RESPIRATORY (INHALATION) EVERY 4 HOURS PRN
Qty: 18 G | Refills: 11 | Status: SHIPPED | OUTPATIENT
Start: 2025-08-25

## 2025-08-25 RX ORDER — LEVALBUTEROL TARTRATE 45 UG/1
3 AEROSOL, METERED ORAL ONCE
Status: COMPLETED | OUTPATIENT
Start: 2025-08-25 | End: 2025-08-25

## 2025-08-25 RX ADMIN — LEVALBUTEROL TARTRATE 3 PUFF: 45 AEROSOL, METERED ORAL at 13:22

## 2025-08-27 ENCOUNTER — OFFICE VISIT (OUTPATIENT)
Dept: CARDIOLOGY | Facility: CLINIC | Age: 75
End: 2025-08-27
Payer: MEDICARE

## 2025-08-27 VITALS
HEIGHT: 72 IN | DIASTOLIC BLOOD PRESSURE: 78 MMHG | WEIGHT: 191.6 LBS | SYSTOLIC BLOOD PRESSURE: 118 MMHG | HEART RATE: 72 BPM | OXYGEN SATURATION: 94 % | BODY MASS INDEX: 25.95 KG/M2

## 2025-08-27 DIAGNOSIS — I25.10 CORONARY ARTERY DISEASE INVOLVING NATIVE CORONARY ARTERY OF NATIVE HEART WITHOUT ANGINA PECTORIS: Chronic | ICD-10-CM

## 2025-08-27 DIAGNOSIS — E78.2 MIXED HYPERLIPIDEMIA: Chronic | ICD-10-CM

## 2025-08-27 DIAGNOSIS — Z95.0 PRESENCE OF CARDIAC PACEMAKER: Primary | Chronic | ICD-10-CM

## 2025-08-27 DIAGNOSIS — I95.9 HYPOTENSION, UNSPECIFIED HYPOTENSION TYPE: Chronic | ICD-10-CM

## (undated) DEVICE — INTRO TEAR AWAY/LVD W/SD PRT 6F 13CM

## (undated) DEVICE — STERILE PVP: Brand: MEDLINE INDUSTRIES, INC.

## (undated) DEVICE — CATHETER GUIDE

## (undated) DEVICE — ARM SLING II: Brand: DEROYAL

## (undated) DEVICE — ST INF PRI SMRTSTE 20DRP 2VLV 24ML 117

## (undated) DEVICE — SYR SLPTP 20CC

## (undated) DEVICE — TBG PENCL TELESCP MEGADYNE SMOKE EVAC 10FT

## (undated) DEVICE — 3M™ IOBAN™ 2 ANTIMICROBIAL INCISE DRAPE 6650EZ: Brand: IOBAN™ 2

## (undated) DEVICE — VISION PROBE: Brand: ION

## (undated) DEVICE — 3M™ MEDIPORE™ H SOFT CLOTH SURGICAL TAPE, 2863, 3 IN X 10 YD, 12/CASE: Brand: 3M™ MEDIPORE™

## (undated) DEVICE — GLV SURG BIOGEL LTX PF 8

## (undated) DEVICE — ELECTRD BLD EZ CLN STD 6.5IN

## (undated) DEVICE — SUT VIC 3/0 PSL 3/0 27IN J502H

## (undated) DEVICE — PENCL ROCKRSWCH MEGADYNE W/HOLSTR 10FT SS

## (undated) DEVICE — LIMB HOLDER, WRIST/ANKLE: Brand: DEROYAL

## (undated) DEVICE — DRSNG SURG AQUACEL AG 9X15CM

## (undated) DEVICE — ST EXT IV SMARTSITE 2VLV SP M LL 5ML IV1

## (undated) DEVICE — VISION PROBE ADAPTER AND SUCTION ADAPTER

## (undated) DEVICE — ECHELON FLEX 60 ARTICULATING ENDOSCOPIC LINEAR CUTTER (NO CARTRIDGE): Brand: ECHELON FLEX ENDOPATH

## (undated) DEVICE — 32 FR STRAIGHT – SOFT PVC CATHETER: Brand: PVC THORACIC CATHETERS

## (undated) DEVICE — BIOPSY NEEDLE, 21G: Brand: FLEXISION

## (undated) DEVICE — CATHETER: Brand: ION

## (undated) DEVICE — TRAP SPECI MUCUS 40CC LF STRL

## (undated) DEVICE — Device: Brand: SINGLE USE ASPIRATION NEEDLE NA-U401SX

## (undated) DEVICE — LEX CATH LAB MINOR: Brand: MEDLINE INDUSTRIES, INC.

## (undated) DEVICE — ELECTRD BLD EZ CLN STD 2.5IN

## (undated) DEVICE — BRUSH CYTO BRONCOSCOPE

## (undated) DEVICE — GLV SURG BIOGEL LTX PF 7 1/2

## (undated) DEVICE — VLV SXN BRONCH DISP FOR FLEX ENDOSCOPE

## (undated) DEVICE — BIOPSY NEEDLE, 19G: Brand: FLEXISION

## (undated) DEVICE — ECHELON FLEX  POWERED VASCULAR STAPLER WITH ADVANCED PLACEMENT TIP, 35MM: Brand: ECHELON FLEX

## (undated) DEVICE — SUT VIC 2 TP1 54IN J880T

## (undated) DEVICE — FRCP BX RADJAW4 PULM WO NDL STD1.8X2 100

## (undated) DEVICE — CONN REDUCING CANN/PUMP 3/8X3/8X3/8

## (undated) DEVICE — SUT VIC FS2 4/0 27IN J422H

## (undated) DEVICE — PATIENT RETURN ELECTRODE, SINGLE-USE, CONTACT QUALITY MONITORING, ADULT, WITH 9FT CORD, FOR PATIENTS WEIGING OVER 33LBS. (15KG): Brand: MEGADYNE

## (undated) DEVICE — BOWL UTIL STRL 32OZ

## (undated) DEVICE — SOL IRR NACL 0.9PCT 1000ML

## (undated) DEVICE — SWIVEL CONNECTOR

## (undated) DEVICE — SPNG GZ WOVN 4X4IN 12PLY 10/BX STRL

## (undated) DEVICE — ELECTRD BLD EZ CLN STD 4IN

## (undated) DEVICE — TRAP FLD MINIVAC MEGADYNE 100ML

## (undated) DEVICE — Device: Brand: ION

## (undated) DEVICE — DRAPE,INSTRUMENT,MAGNETIC,10X16: Brand: MEDLINE

## (undated) DEVICE — PK THORACOTOMY 10

## (undated) DEVICE — CAUTERY TIP POLISHER: Brand: DEVON

## (undated) DEVICE — SENSR O2 OXIMAX FNGR A/ 18IN NONSTR

## (undated) DEVICE — DECANTER BAG 9": Brand: MEDLINE INDUSTRIES, INC.

## (undated) DEVICE — DISH PETRI 3.5IN MD STRL LF

## (undated) DEVICE — SINGLE USE SUCTION VALVE MAJ-209: Brand: SINGLE USE SUCTION VALVE (STERILE)

## (undated) DEVICE — PENCL ES MEGADINE EZ/CLEAN BUTN W/HOLSTR 10FT